# Patient Record
Sex: FEMALE | Race: WHITE | NOT HISPANIC OR LATINO | Employment: OTHER | URBAN - METROPOLITAN AREA
[De-identification: names, ages, dates, MRNs, and addresses within clinical notes are randomized per-mention and may not be internally consistent; named-entity substitution may affect disease eponyms.]

---

## 2017-01-11 ENCOUNTER — VIRTUAL VISIT (OUTPATIENT)
Dept: EDUCATION SERVICES | Facility: OTHER | Age: 62
End: 2017-01-11

## 2017-01-11 DIAGNOSIS — E11.319 UNCONTROLLED TYPE 2 DIABETES MELLITUS WITH RETINOPATHY, WITH LONG-TERM CURRENT USE OF INSULIN, MACULAR EDEMA PRESENCE UNSPECIFIED, UNSPECIFIED RETINOPATHY SEVERITY: Primary | ICD-10-CM

## 2017-01-11 DIAGNOSIS — E11.65 UNCONTROLLED TYPE 2 DIABETES MELLITUS WITH RETINOPATHY, WITH LONG-TERM CURRENT USE OF INSULIN, MACULAR EDEMA PRESENCE UNSPECIFIED, UNSPECIFIED RETINOPATHY SEVERITY: Primary | ICD-10-CM

## 2017-01-11 DIAGNOSIS — Z79.4 UNCONTROLLED TYPE 2 DIABETES MELLITUS WITH RETINOPATHY, WITH LONG-TERM CURRENT USE OF INSULIN, MACULAR EDEMA PRESENCE UNSPECIFIED, UNSPECIFIED RETINOPATHY SEVERITY: Primary | ICD-10-CM

## 2017-01-11 NOTE — PROGRESS NOTES
Diabetes Follow-up    Subjective/Objective:    Holly Muir sent in blood glucose log for review. Last date of communication was: 12/28/16.    Diabetes is being managed with V-Go 40 (changes at 9 pm at night) + Novolog, about 6 - 10 units when she's used up the V-Go mealtime insulin (which isn't as often now).   NPH, 10 units at bedtime at 9 pm.  Accu-chek Expert meter settings:  Carb Ratio:  1  Unit per 5 grams.  Sensitivity:  30 mg/dl.  BG target:  120 - 160 mg/dl .     Reports 2 days ago started hallucinating and felt funny.  She doesn't feel it is because of her medications.    BG/Food Log:   Date Breakfast  Lunch  Dinner  Bedtime    Before After Before After Before After    1/11 317 (ate in the middle of the night) - 78 - - - -   1/10 - - - 276 - - -   1/9 210 196 236 170 114 - -   1/8 - - 213 - - - 170   1/7 - - - - - - 122   1/6 - - 244 114 - - -   1/5 258 - - 222 231 - -     Date Breakfast  Lunch  Dinner  Bedtime    Before After Before After Before After    1/4 - - - - 136 - 247   1/3 344  - 205 156 127 - 188   1/2 - - 309 326 219 164 103  81  61 (3am)   1/1 - - 361 - 329 - -   31 - - - - 243 - -   30 203 - - 175 - - -   29 307 - - 169 - - -     Sleep at night has not been as good.      Tried taking insulin with her coffee, as instructed, a couple of times but admits she is not in the habit of doing it daily.      Assessment:    Blood sugars above goal the majority of the time.  High blood sugars may be due to over eating or eating in the middle of the night.  Poor sleep may also contribute to high blood sugars.  Will benefit from additional insulin at bedtime to improve fasting blood sugars, which may then improve blood sugars during the day.      Plan/Response:  Recommend that patient take insulin with coffee in the morning - 1 click or 2 units  Recommend increase to insulin - increase bedtime NPH insulin from 10 to 12 units.  Contact psychologist with concerns about hallucinating.    FOLLOW  UP:  Telephone visit in 2 weeks.    Maria T Elias RD, MARCIANO, CDE      Any diabetes medication dose changes were made via the CDE Protocol and Collaborative Practice Agreement with the patient's primary care provider. A copy of this encounter was shared with the provider.

## 2017-01-13 ENCOUNTER — TELEPHONE (OUTPATIENT)
Dept: PEDIATRICS | Facility: CLINIC | Age: 62
End: 2017-01-13

## 2017-01-13 DIAGNOSIS — E11.65 UNCONTROLLED TYPE 2 DIABETES MELLITUS WITH RETINOPATHY, WITH LONG-TERM CURRENT USE OF INSULIN, MACULAR EDEMA PRESENCE UNSPECIFIED, UNSPECIFIED RETINOPATHY SEVERITY: Primary | ICD-10-CM

## 2017-01-13 DIAGNOSIS — Z79.4 UNCONTROLLED TYPE 2 DIABETES MELLITUS WITH RETINOPATHY, WITH LONG-TERM CURRENT USE OF INSULIN, MACULAR EDEMA PRESENCE UNSPECIFIED, UNSPECIFIED RETINOPATHY SEVERITY: Primary | ICD-10-CM

## 2017-01-13 DIAGNOSIS — E11.319 UNCONTROLLED TYPE 2 DIABETES MELLITUS WITH RETINOPATHY, WITH LONG-TERM CURRENT USE OF INSULIN, MACULAR EDEMA PRESENCE UNSPECIFIED, UNSPECIFIED RETINOPATHY SEVERITY: Primary | ICD-10-CM

## 2017-01-13 NOTE — TELEPHONE ENCOUNTER
insulin NPH (NOVOLIN N VIAL) 100 UNIT/ML injection 3 mL 2 2017  No      Si units at bedtime     Class: E-Prescribe     Notes to Pharmacy: Dose adjustment.  Please do not fill until patient requests it.       Received fax from pharmacy requesting clarification on above Rx sent. Pharmacy notes Rx states Dispense 3mL, but thinks it should be 30mL. Please clarify and update script to pharmacy if needed.  eBre Patino, CMA

## 2017-01-19 ENCOUNTER — OFFICE VISIT (OUTPATIENT)
Dept: FAMILY MEDICINE | Facility: CLINIC | Age: 62
End: 2017-01-19
Payer: MEDICARE

## 2017-01-19 VITALS
DIASTOLIC BLOOD PRESSURE: 64 MMHG | HEART RATE: 88 BPM | SYSTOLIC BLOOD PRESSURE: 123 MMHG | BODY MASS INDEX: 35.11 KG/M2 | WEIGHT: 192 LBS | OXYGEN SATURATION: 96 % | TEMPERATURE: 98.1 F

## 2017-01-19 DIAGNOSIS — J06.9 UPPER RESPIRATORY TRACT INFECTION, UNSPECIFIED TYPE: Primary | ICD-10-CM

## 2017-01-19 DIAGNOSIS — R05.9 COUGH: ICD-10-CM

## 2017-01-19 PROCEDURE — 99213 OFFICE O/P EST LOW 20 MIN: CPT | Performed by: PHYSICIAN ASSISTANT

## 2017-01-19 RX ORDER — BENZONATATE 200 MG/1
200 CAPSULE ORAL 3 TIMES DAILY PRN
Qty: 21 CAPSULE | Refills: 0 | Status: SHIPPED | OUTPATIENT
Start: 2017-01-19 | End: 2017-05-26

## 2017-01-19 NOTE — MR AVS SNAPSHOT
After Visit Summary   1/19/2017    Holly Muir    MRN: [id number]           Patient Information     Date Of Birth          1955        Visit Information        Provider Department      1/19/2017 1:50 PM Saqib Diaz PA-C [facility]        Today's Diagnoses     Upper respiratory tract infection, unspecified type    -  1     Cough            Follow-ups after your visit        Your next 10 appointments already scheduled     Jan 19, 2017  1:50 PM   SHORT with Saqib Diaz PA-C   [facility]    [address] [facility] [id number] [phone]            Jan 24, 2017 11:45 AM   Telephone Visit with ER DIABETIC ED RESOURCE   [facility]    [address] [facility] [id number] [phone]           Note: this is not an onsite visit; there is no need to come to the facility.            Feb 24, 2017  8:00 AM   New Visit with Elizabeth Medrano MD   [facility]    [address] [place] [id number] [phone]              Who to contact     If you have questions or need follow up information about today's clinic visit or your schedule please contact [facility] directly at [phone].  Normal or non-critical lab and imaging results will be communicated to you by MyChart, letter or phone within 4 business days after the clinic has received the results. If you do not hear from us within 7 days, please contact the clinic through MyChart or phone. If you have a critical or abnormal lab result, we will notify you by phone as soon as possible.  Submit refill requests through [employer] or call your pharmacy and they will forward the refill request to us. Please allow 3 business days for your refill to be completed.          Additional Information About Your Visit        MyChart Information     [employer] [email or web address] lets  "you send messages to your doctor, view your test results, renew your prescriptions, schedule appointments and more. To sign up, go to [email or web address] . Click on \"Log in\" on the left side of the screen, which will take you to the Welcome page. Then click on \"Sign up Now\" on the right side of the page.     You will be asked to enter the access code listed below, as well as some personal information. Please follow the directions to create your username and password.     Your access code is: [email or web address]  Expires: 2017  1:45 PM     Your access code will  in 90 days. If you need help or a new code, please call your [place] clinic or [phone].        Care EveryWhere ID     This is your Care EveryWhere ID. This could be used by other organizations to access your [place] medical records  [id number]        Your Vitals Were     Pulse Temperature Pulse Oximetry             88 98.1  F (36.7  C) (Oral) 96%          Blood Pressure from Last 3 Encounters:   17 123/64   16 103/69   16 88/58    Weight from Last 3 Encounters:   17 192 lb (87.091 kg)   16 189 lb 4.8 oz (85.866 kg)   16 195 lb (88.451 kg)              Today, you had the following     No orders found for display         Today's Medication Changes          These changes are accurate as of: 17  1:45 PM.  If you have any questions, ask your nurse or doctor.               Start taking these medicines.        Dose/Directions    benzonatate 200 MG capsule   Commonly known as:  TESSALON   Used for:  Upper respiratory tract infection, unspecified type, Cough   Started by:  Saqib Diaz PA-C        Dose:  200 mg   Take 1 capsule (200 mg) by mouth 3 times daily as needed for cough   Quantity:  21 capsule   Refills:  0         These medicines have changed or have updated prescriptions.        Dose/Directions    omeprazole 40 MG capsule   Commonly known as:  priLOSEC   This may have changed:  additional " instructions   Used for:  Gastroesophageal reflux disease without esophagitis        Dose:  40 mg   Take 1 capsule (40 mg) by mouth daily Take 30-60 minutes before a meal.   Quantity:  90 capsule   Refills:  3            Where to get your medicines      These medications were sent [place] - [address] [facility], [address]  [address] [facility], [facility] [address], [facility] [address]     Phone:  [phone]    - benzonatate 200 MG capsule             Primary Care Provider Office Phone # Fax #    Tiffany King [phone]       [facility] [address]  [facility] [address]        Goals        Patient-Stated    I will continue to monitor my blood sugars and treat the results as directed.     Goal Comments - Note created  12/9/2015 10:26 AM by Randy Suarez RN    As of today's date 12/9/2015 goal is met at 0 - 25%.   Goal Status:  Active        Psychosocial     Goal Comments - Note edited  5/5/2016 10:35 AM by Kristin Naqvi BSW    As of today's date 5/5/2016 goal is met at 76 - 100%.   Goal Status:  Complete   Pt is certified to use [employer]   As of today's date 4/28/2016 goal is met at 76 - 100%.   Goal Status:  Complete  I want to become certified to use [employer] for transportation   As of today's date 3/4/2016 goal is met at 0 - 25%.   Goal Status:  Active    As of today's date 3/31/2016 goal is met at 26 - 50%.   Goal Status:  Active  [employer] application mailed from clinic on 3-25-16  ALMA Myrick          Thank you!     Thank you for choosing [facility]  for your care. Our goal is always to provide you with excellent care. Hearing back from our patients is one way we can continue to improve our services. Please take a few minutes to complete the written survey that you may receive in the mail after your visit with us. Thank you!             Your Updated Medication List - Protect others around you: Learn how to safely use, store and  throw away your medicines at www.disposemymeds.org.          This list is accurate as of: 1/19/17  1:45 PM.  Always use your most recent med list.                   Brand Name Dispense Instructions for use    ABILIFY 15 MG tablet   Generic drug:  ARIPiprazole     90 tablet    TAKE 1 TABLET BY MOUTH AT BEDTIME       ACCU-CHEK INSTANT CONTROL Liqd     1 Bottle    1 Application as needed       aspirin 81 MG tablet     90 tablet    Take 1 tablet (81 mg) by mouth daily       B-COMPLEX/B-12 PO      Take 1 tablet by mouth daily.       benzonatate 200 MG capsule    TESSALON    21 capsule    Take 1 capsule (200 mg) by mouth 3 times daily as needed for cough       blood glucose monitoring lancets     3 Box    1 each 8 times daily Use to test blood sugar 8 times daily or as directed.       * blood glucose monitoring test strip    ACCU-CHEK CELIA    250 each    Use to test blood sugars 8 times daily or as directed.       * ACCU-CHEK CELIA PLUS test strip   Generic drug:  blood glucose monitoring     250 each    USE TO TEST BLOOD SUGARS 8 TIMES DAILY OR AS DIRECTED.       buPROPion 150 MG 24 hr tablet    WELLBUTRIN XL    270 tablet    TAKE 3 TABLETS (450 MG) BY MOUTH EVERY MORNING       BusPIRone HCl 30 MG Tabs     60 tablet    TAKE 1 TABLET BY MOUTH TWICE A DAY       enalapril 2.5 MG tablet    VASOTEC    90 tablet    TAKE 1 TABLET BY MOUTH EVERY DAY       gabapentin 100 MG capsule    NEURONTIN    90 capsule    TAKE 1 CAPSULE (100 MG) BY MOUTH 3 TIMES DAILY       ibuprofen 200 MG tablet    ADVIL/MOTRIN     Take 3-4 tablets by mouth every 4 hours as needed.       * insulin aspart 100 UNIT/ML injection    NovoLOG FLEXPEN    1 Month    Use to supplement V-Go 40.  Up to 10 units a day.       * NovoLOG VIAL 100 UNITS/ML injection   Generic drug:  insulin aspart     30 mL    INJECT SUBCUTANEOUSLY UP TO 76 UNITS A DAY       * insulin aspart 100 UNIT/ML injection    NovoLOG FLEXPEN    1 Month    Use up to 10 units a day to supplement  "V-Go       insulin  UNIT/ML injection    NovoLIN N VIAL    30 mL    12 units at bedtime       * insulin pen needle 31G X 5 MM     3 Box    3 Box See Admin Instructions Use four times a day times a day       * insulin pen needle 31G X 5 MM    B-D U/F    100 each    Use 1 daily or as directed.       insulin syringe-needle U-100 31G X 5/16\" 0.3 ML    BD insulin syringe ultrafine    100 each    Use one syringe daily or as directed.       metFORMIN 1000 MG tablet    GLUCOPHAGE    60 tablet    TAKE 1 TABLET BY MOUTH 2 TIMES DAILY WITH MEALS       Multi-vitamin Tabs tablet   Generic drug:  multivitamin, therapeutic with minerals      1 TABLET DAILY       nystatin 514816 UNIT/GM Powd    MYCOSTATIN    60 g    Apply 1 dose topically 3 times daily as needed       omeprazole 40 MG capsule    priLOSEC    90 capsule    Take 1 capsule (40 mg) by mouth daily Take 30-60 minutes before a meal.       order for DME     1 Units    Equipment being ordered: Cane       order for DME     3 Month    Equipment being ordered:  Ostomy supplies.  Drain Pouch by [employer] #.    Also needs Barrier by [place] #.       PARoxetine 20 MG tablet    PAXIL    270 tablet    TAKE 3 TABLETS AT BEDTIME       propranolol 60 MG 24 hr capsule    INDERAL LA    60 capsule    Take 1 capsule (60 mg) by mouth daily May take a second dose if tremor recurs.       simvastatin 20 MG tablet    ZOCOR    90 tablet    TAKE 1 TABLET (20 MG) BY MOUTH AT BEDTIME       SKIN PREP WIPES Misc     1 Bottle    to be used with colostomy care       traMADol 50 MG tablet    ULTRAM    60 tablet    TAKE 1 TABLET BY MOUTH TWICE A DAY AS NEEDED FOR MODERATE PAIN       Universal Remover Wipes Misc     1 Each    uses to remove barrier from colostomy bag at time of change       vitamin D 1000 UNITS capsule     90 capsule    Take 2,000 to 4,000 Units per day       wearable insulin delivery device kit     30 each    USE AS DIRECTED       * Notice:  This list has 7 " medication(s) that are the same as other medications prescribed for you. Read the directions carefully, and ask your doctor or other care provider to review them with you.

## 2017-01-19 NOTE — NURSING NOTE
"Chief Complaint   Patient presents with     Cough       Initial /64 mmHg  Pulse 88  Temp(Src) 98.1  F (36.7  C) (Oral)  Wt 192 lb (87.091 kg)  SpO2 96% Estimated body mass index is 35.11 kg/(m^2) as calculated from the following:    Height as of 12/19/16: 5' 2\" (1.575 m).    Weight as of this encounter: 192 lb (87.091 kg).  BP completed using cuff size: mattie Byrnes CMA      "

## 2017-01-19 NOTE — PROGRESS NOTES
SUBJECTIVE:                                                    Holly Muir is a 61 year old female who presents to clinic today for the following health issues:    RESPIRATORY SYMPTOMS      Duration: 3 days    Description  Cough, runny nose, chills, headache    Severity: moderate    Accompanying signs and symptoms: None    History (predisposing factors):  none    Precipitating or alleviating factors: daughter was recently diagnosed with bronchitis    Therapies tried and outcome:  Robitussin        Problem list and histories reviewed & adjusted, as indicated.  Additional history: as documented    Patient Active Problem List   Diagnosis     Hyperlipidemia LDL goal <100     Severe major depression with psychotic features (H)     History of cervical cancer     S/P colostomy (H)     Mixed incontinence urge and stress     Memory loss     Type 2 diabetes mellitus, uncontrolled A1C goal <8%     Obesity     Pain in joint, hand     Trigger finger, acquired     Synovitis and tenosynovitis     Dermatochalasis     Presbyopia     OCD (obsessive compulsive disorder)     HTN, goal below 140/90     Cataracts, both eyes     Health Care Home     Lumbago     Sprain of lumbar region     Generalized osteoarthrosis, unspecified site     Rotator cuff impingement syndrome     Hypoglycemia due to insulin     Encounter for long-term (current) use of insulin (H)     Colostomy, evaluate (H)     Urinary retention     ACP (advance care planning)     Bilateral low back pain without sciatica     Anxiety     Fecal incontinence due to anorectal disorder     Visual hallucination     Type 2 diabetes mellitus with hyperglycemia (H)     Past Surgical History   Procedure Laterality Date     Hysterectomy, pap no longer indicated       done in [place]-cervical cancer     Salpingo oophorectomy,r/l/jennifer       Salpingo Oophorectomy, RT/LT/JENNIFER     Colostomy  2000     Arthroscopy knee rt/lt       LT     Breast lumpectomy, rt/lt       LT-benign      C excis  pterygium  10/08     [facility]      colonoscopy thru stoma, diagnostic  2002     normal per report-no records available-records destroyed     C stomach surgery procedure unlisted       Knee surgery       Colostomy       Colonoscopy  5/10/2012     Procedure:COLONOSCOPY; screening colonoscopy; Surgeon:MILLA VEGA; Location:[facility] OR     Implant stimulator sacral nerve stage one Right 3/10/2015     Procedure: IMPLANT STIMULATOR SACRAL NERVE STAGE ONE;  Surgeon: Teodora Yusuf MD;  Location: [facility] OR     Implant stimulator sacral nerve stage two Right 3/31/2015     Procedure: IMPLANT STIMULATOR SACRAL NERVE STAGE TWO;  Surgeon: Teodora Yusuf MD;  Location: [facility] OR       Social History   Substance Use Topics     Smoking status: Former Smoker     Quit date: 08/09/2016     Smokeless tobacco: Never Used     Alcohol Use: 0.0 oz/week     0 Standard drinks or equivalent per week      Comment: social occasions     Family History   Problem Relation Age of Onset     DIABETES Mother      CEREBROVASCULAR DISEASE Mother      DIABETES Father      Hypertension Father      CANCER Maternal Grandfather      CANCER Paternal Grandfather      DIABETES Brother      DIABETES Sister      Thyroid Disease Daughter      Thyroid Disease Sister      Glaucoma No family hx of      Macular Degeneration No family hx of          Current Outpatient Prescriptions   Medication Sig Dispense Refill     benzonatate (TESSALON) 200 MG capsule Take 1 capsule (200 mg) by mouth 3 times daily as needed for cough 21 capsule 0     insulin NPH (NOVOLIN N VIAL) 100 UNIT/ML injection 12 units at bedtime 30 mL 2     simvastatin (ZOCOR) 20 MG tablet TAKE 1 TABLET (20 MG) BY MOUTH AT BEDTIME 90 tablet 3     gabapentin (NEURONTIN) 100 MG capsule TAKE 1 CAPSULE (100 MG) BY MOUTH 3 TIMES DAILY 90 capsule 3     insulin aspart (NOVOLOG FLEXPEN) 100 UNIT/ML injection Use up to 10 units a day to supplement V-Go 1 Month 1     wearable insulin delivery device (V-GO 40)  "kit USE AS DIRECTED 30 each 2     metFORMIN (GLUCOPHAGE) 1000 MG tablet TAKE 1 TABLET BY MOUTH 2 TIMES DAILY WITH MEALS 60 tablet 5     BusPIRone HCl 30 MG TABS TAKE 1 TABLET BY MOUTH TWICE A DAY 60 tablet 0     NOVOLOG VIAL 100 UNIT/ML soln INJECT SUBCUTANEOUSLY UP TO 76 UNITS A DAY 30 mL 0     propranolol (INDERAL LA) 60 MG capsule Take 1 capsule (60 mg) by mouth daily May take a second dose if tremor recurs. 60 capsule 11     traMADol (ULTRAM) 50 MG tablet TAKE 1 TABLET BY MOUTH TWICE A DAY AS NEEDED FOR MODERATE PAIN 60 tablet 3     enalapril (VASOTEC) 2.5 MG tablet TAKE 1 TABLET BY MOUTH EVERY DAY 90 tablet 2     ACCU-CHEK CELIA PLUS test strip USE TO TEST BLOOD SUGARS 8 TIMES DAILY OR AS DIRECTED. 250 each 3     PARoxetine (PAXIL) 20 MG tablet TAKE 3 TABLETS AT BEDTIME 270 tablet 1     omeprazole (PRILOSEC) 40 MG capsule Take 1 capsule (40 mg) by mouth daily Take 30-60 minutes before a meal. (Patient taking differently: Take 40 mg by mouth daily Take 2 tabs dailyTake 30-60 minutes before a meal.) 90 capsule 3     aspirin 81 MG tablet Take 1 tablet (81 mg) by mouth daily 90 tablet 3     nystatin (MYCOSTATIN) 622861 UNIT/GM POWD Apply 1 dose topically 3 times daily as needed 60 g 3     buPROPion (WELLBUTRIN XL) 150 MG 24 hr tablet TAKE 3 TABLETS (450 MG) BY MOUTH EVERY MORNING 270 tablet 1     ABILIFY 15 MG tablet TAKE 1 TABLET BY MOUTH AT BEDTIME 90 tablet 1     blood glucose monitoring (ACCU-CHEK CELIA) test strip Use to test blood sugars 8 times daily or as directed. 250 each 3     insulin aspart (NOVOLOG FLEXPEN) 100 UNIT/ML soln Use to supplement V-Go 40.  Up to 10 units a day. 1 Month 1     insulin syringe-needle U-100 (BD INSULIN SYRINGE ULTRAFINE) 31G X 5/16\" 0.3 ML Use one syringe daily or as directed. 100 each prn     insulin pen needle (B-D U/F) 31G X 5 MM Use 1 daily or as directed. 100 each prn     blood glucose monitoring (ACCU-CHEK FASTCLIX) lancets 1 each 8 times daily Use to test blood sugar 8 " times daily or as directed. 3 Box prn     Blood Glucose Calibration (ACCU-CHEK INSTANT CONTROL) LIQD 1 Application as needed 1 Bottle prn     ORDER FOR DME Equipment being ordered:  Ostomy supplies.  Drain Pouch by [place] #.    Also needs Barrier by [place] #. 3 Month 4     insulin pen needle needle 3 Box See Admin Instructions Use four times a day times a day 3 Box 3     Cholecalciferol (VITAMIN D) 1000 UNITS capsule Take 2,000 to 4,000 Units per day 90 capsule      ORDER FOR DME Equipment being ordered: Cane 1 Units 0     B Complex Vitamins (B-COMPLEX/B-12 PO) Take 1 tablet by mouth daily.        ibuprofen (ADVIL,MOTRIN) 200 MG tablet Take 3-4 tablets by mouth every 4 hours as needed.        UNIVERSAL REMOVER WIPES EX MISC uses to remove barrier from colostomy bag at time of change 1 Each 3     SKIN PREP WIPES MISC to be used with colostomy care 1 Bottle 3     MULTI-VITAMIN OR TABS 1 TABLET DAILY       Allergies   Allergen Reactions     Morphine Sulfate Itching     BP Readings from Last 3 Encounters:   01/19/17 123/64   12/19/16 103/69   12/01/16 88/58    Wt Readings from Last 3 Encounters:   01/19/17 192 lb (87.091 kg)   12/19/16 189 lb 4.8 oz (85.866 kg)   12/01/16 195 lb (88.451 kg)                  Labs reviewed in EPIC  Problem list, Medication list, Allergies, and Medical/Social/Surgical histories reviewed in [facility] and updated as appropriate.    ROS:  Constitutional, HEENT, cardiovascular, pulmonary, gi and gu systems are negative, except as otherwise noted.    OBJECTIVE:                                                    /64 mmHg  Pulse 88  Temp(Src) 98.1  F (36.7  C) (Oral)  Wt 192 lb (87.091 kg)  SpO2 96%  Body mass index is 35.11 kg/(m^2).  GENERAL: healthy, alert and no distress  EYES: Eyes grossly normal to inspection, PERRL and conjunctivae and sclerae normal  HENT: ear canals and TM's normal, nose and mouth without ulcers or lesions  NECK: no adenopathy, no asymmetry, masses,  or scars and thyroid normal to palpation  RESP: lungs clear to auscultation - no rales, rhonchi or wheezes  CV: regular rate and rhythm, normal S1 S2, no S3 or S4, no murmur, click or rub, no peripheral edema and peripheral pulses strong  ABDOMEN: soft, nontender, no hepatosplenomegaly, no masses and bowel sounds normal  MS: no gross musculoskeletal defects noted, no edema    Diagnostic Test Results:  none      ASSESSMENT/PLAN:                                                        ICD-10-CM    1. Upper respiratory tract infection, unspecified type J06.9 benzonatate (TESSALON) 200 MG capsule   2. Cough R05 benzonatate (TESSALON) 200 MG capsule   Warning signs discussed.  side effects discussed  Symptomatic treatment: such as fluids,  OTC acetaminophen and /or non-steroidal anti-inflammatory medication.  Follow up  1-2 wks as needed      Saqib Diaz PA-C  [facility]

## 2017-01-24 ENCOUNTER — VIRTUAL VISIT (OUTPATIENT)
Dept: EDUCATION SERVICES | Facility: OTHER | Age: 62
End: 2017-01-24

## 2017-01-24 DIAGNOSIS — E11.65 TYPE 2 DIABETES MELLITUS WITH HYPERGLYCEMIA (H): Primary | ICD-10-CM

## 2017-01-24 NOTE — PROGRESS NOTES
Diabetes Follow-up    Subjective/Objective:    Holly Muir sent in blood glucose log for review. Last date of communication was: 1/11/17.  Unable to talk on today.  Will try again in 2 days, 1/26/17.    Diabetes is being managed with V-Go 40 (changes at 9 pm at night) + Novolog, about 6 - 10 units when she's used up the V-Go mealtime insulin.   NPH, 12 units at bedtime at 9 pm.  Accu-chek Expert meter settings:  Carb Ratio:  1  Unit per 5 grams.  Sensitivity:  30 mg/dl.  BG target:  120 - 160 mg/dl .    BG/Food Log:   Date Breakfast  Lunch  Dinner  Bedtime    Before After Before After Before After    1/26 295 (at in middle of night)  97       1/25   224  120  265   1/24 252   91 82     1/23 248  135 105 110 107    1/22     474  372 (3am in the next morning)   1/21     474     1/20 142      119 (3am)     Date Breakfast  Lunch  Dinner  Bedtime    Before After Before After Before After    1/19 438 / 252         1/17   245 121 154 224    1/16 307 /285         1/15 136 (3am)         1/13 189 325     135   1/12    279 129                 Admits if BG at bedtime is <120 mg/dl, will eat a bedtime snack of bread and butter.  Eats in the middle of the night - will have fruit.  She states she takes insulin if she has a carbohydrate containing food.    Not going to the fitness center, but plans to return in a week.    Assessment:    Fasting blood glucose: above target the majority of the time.  NPH insulin was increased 2 weeks ago, but did not influence the fasting blood sugars.  Daytime blood sugars have improved.  High blood sugars are likely due to eating high carbohydrate meals.    Plan/Response:  Increase bedtime NPH from 12 to 14 units.  If, after 3 days, fasting blood sugar remains >200 mg/dl, increase bedtime NPH insulin to 16 units.  Encouraged non-carbohydrate foods (cheese, non-starchy vegetables) as middle of the night snacks if she chooses to eat.  Encouraged regular physical activity.    Maria T Elias RD,  LEORA [facility]AR[facility]      Any diabetes medication dose changes were made via the CDE Protocol and Collaborative Practice Agreement with the patient's primary care provider. A copy of this encounter was shared with the provider.

## 2017-01-26 ENCOUNTER — CARE COORDINATION (OUTPATIENT)
Dept: CARE COORDINATION | Facility: CLINIC | Age: 62
End: 2017-01-26

## 2017-01-26 NOTE — PROGRESS NOTES
Clinic Care Coordination Contact - Social Work - Follow-Up - 1-26-17  Care Team Conversations    SW contacted pt via phone to follow-up regarding pt's anxiety and depression.  Pt reports that she continues to see her counselor and psychiatrist at [facility] regularly and feels that helps her mood.  Pt also takes Paxil and Wellbutrin which pt finds helpful.  Pt continues to have [facility] RN visit regularly (Adams is RN Case Manager, [phone]) and reports that pt is stable at home currently.  SW will follow-up with pt in one month.      ALMA Myrick  Care Coordinator - Social Work  [facility]  Office:  [phone]  1/26/2017 3:33 PM

## 2017-02-02 DIAGNOSIS — F32.3 SEVERE MAJOR DEPRESSION WITH PSYCHOTIC FEATURES (H): Primary | ICD-10-CM

## 2017-02-06 RX ORDER — PAROXETINE 20 MG/1
TABLET, FILM COATED ORAL
Qty: 270 TABLET | Refills: 0 | Status: SHIPPED | OUTPATIENT
Start: 2017-02-06 | End: 2017-07-26

## 2017-02-09 ENCOUNTER — VIRTUAL VISIT (OUTPATIENT)
Dept: EDUCATION SERVICES | Facility: CLINIC | Age: 62
End: 2017-02-09
Payer: MEDICARE

## 2017-02-09 DIAGNOSIS — E11.319 UNCONTROLLED TYPE 2 DIABETES MELLITUS WITH RETINOPATHY, WITH LONG-TERM CURRENT USE OF INSULIN, MACULAR EDEMA PRESENCE UNSPECIFIED, UNSPECIFIED RETINOPATHY SEVERITY: Primary | ICD-10-CM

## 2017-02-09 DIAGNOSIS — Z79.4 UNCONTROLLED TYPE 2 DIABETES MELLITUS WITH RETINOPATHY, WITH LONG-TERM CURRENT USE OF INSULIN, MACULAR EDEMA PRESENCE UNSPECIFIED, UNSPECIFIED RETINOPATHY SEVERITY: Primary | ICD-10-CM

## 2017-02-09 DIAGNOSIS — E11.65 UNCONTROLLED TYPE 2 DIABETES MELLITUS WITH RETINOPATHY, WITH LONG-TERM CURRENT USE OF INSULIN, MACULAR EDEMA PRESENCE UNSPECIFIED, UNSPECIFIED RETINOPATHY SEVERITY: Primary | ICD-10-CM

## 2017-02-09 PROCEDURE — 99207 ZZC NO BILLABLE SERVICE THIS VISIT: CPT

## 2017-02-09 NOTE — PROGRESS NOTES
Diabetes Follow-up    Subjective/Objective:    Holly Muir sent in blood glucose log for review. Last date of communication was: 1/26/17.    Diabetes is being managed with   V-Go 40 (changes at 9 pm at night) + Novolog, about 6 - 10 units when she's used up the V-Go mealtime insulin.   NPH, 16 units at bedtime at 9 pm.  Accu-chek Expert meter settings:  Carb Ratio:  1  Unit per 5 grams.  Sensitivity:  30 mg/dl.  BG target:  120 - 160 mg/dl .    BG/Food Log:   Date Breakfast  Lunch  Dinner  Bedtime    Before After Before After Before After    2/9 467* - - - - - -   2/8 146 - - 228 60 - -   2/7 55 (3am)  248 - 97 115 236 106 252   2/6 188 - - 145 269 147 / 156 75   2/5 - - - 490** - - 450**   2/4 - - - 294 - - -   2/3 109 - - 121 186 - -     Date Breakfast  Lunch  Dinner  Bedtime    Before After Before After Before After    2/2 144 (3am)  233 96 - 89 - - 88   2/1 60 (4:46am)  200 187 65 120/82 - - 95 / 124   1/31 109 -  - 121 - -   1/30 - - - - - - -   - - - - - - - -   - - - - - - - -   - - - - - - - -       *ate late and did not take insulin  **Super bowl snacking    Reports getting out of the house with her friends more recently.  Went swimming at the [employer] this past week.      Assessment:    Overall improvement in blood sugars.  Two weeks ago, bedtime NPH insulin was increased from 12 to 16 units.  Fasting blood sugars improved, however Holly experienced 2 low blood sugars in the middle of the night over the past week and a half.  May benefit from a slight reduction in bedtime NPH insulin.    Holly is commended for her efforts in getting out of the house more and taking an exercise class.    Plan/Response:  Recommend decrease to insulin - Decrease bedtime NPH insulin from 16 to 15 units.    FOLLOW UP:    Telephone visit in 2 weeks.    Maria T Elias RD, LD, CDE      Any diabetes medication dose changes were made via the CDE Protocol and Collaborative Practice Agreement with the patient's primary care provider.  A copy of this encounter was shared with the provider.

## 2017-02-22 ENCOUNTER — TELEPHONE (OUTPATIENT)
Dept: ENDOCRINOLOGY | Facility: CLINIC | Age: 62
End: 2017-02-22

## 2017-02-22 ENCOUNTER — TELEPHONE (OUTPATIENT)
Dept: PEDIATRICS | Facility: CLINIC | Age: 62
End: 2017-02-22

## 2017-02-22 DIAGNOSIS — Z79.4 UNCONTROLLED TYPE 2 DIABETES MELLITUS WITH HYPERGLYCEMIA, WITH LONG-TERM CURRENT USE OF INSULIN (H): ICD-10-CM

## 2017-02-22 DIAGNOSIS — E11.65 UNCONTROLLED TYPE 2 DIABETES MELLITUS WITH HYPERGLYCEMIA, WITH LONG-TERM CURRENT USE OF INSULIN (H): ICD-10-CM

## 2017-02-22 DIAGNOSIS — M25.561 ARTHRALGIA OF BOTH KNEES: ICD-10-CM

## 2017-02-22 DIAGNOSIS — M17.10 ARTHRITIS OF KNEE: ICD-10-CM

## 2017-02-22 DIAGNOSIS — M25.562 ARTHRALGIA OF BOTH KNEES: ICD-10-CM

## 2017-02-22 DIAGNOSIS — E11.42 DIABETIC POLYNEUROPATHY ASSOCIATED WITH TYPE 2 DIABETES MELLITUS (H): ICD-10-CM

## 2017-02-22 RX ORDER — TRAMADOL HYDROCHLORIDE 50 MG/1
50-100 TABLET ORAL DAILY PRN
Qty: 60 TABLET | Refills: 3 | Status: SHIPPED | OUTPATIENT
Start: 2017-02-22 | End: 2017-10-21

## 2017-02-22 RX ORDER — GABAPENTIN 300 MG/1
300 CAPSULE ORAL 3 TIMES DAILY
Qty: 90 CAPSULE | Refills: 1 | Status: SHIPPED | OUTPATIENT
Start: 2017-02-22 | End: 2017-05-03

## 2017-02-22 NOTE — TELEPHONE ENCOUNTER
Meredith, home care RN.  States patient needs refill of tramadol 50mg. Wondering if a bigger dose can be given or another alternative pain med.    Last filled Jan 6 for 60 pills.  Patient only taking 1 pill a day. Not really helping pain but patient does not want to take more than once a day. Home care nurse wondering if can get a higher dose once a day instead or something else that could help.  Patient does not want to take pain meds more than once a day.  She is having pain in her leg and the home care nurse said you can tell she is in pain as she is limping and walking strangely.  Patient states its related to the weather and feels like its in her bone.  It has been worse for a couple of days now.     Please call patient when RX completed.    Zoraida Min RN,   . [facility]

## 2017-02-22 NOTE — TELEPHONE ENCOUNTER
Spoke to patient.  She states she already tried taking (2-50mg pills) or 100mg in the morning and that is not working either for the pain.      Will route back to Dr. King as the new RX would be the same dose as patient is currently taking.    Zoraida Min RN,   [facility], [facility]

## 2017-02-22 NOTE — TELEPHONE ENCOUNTER
She is taking gabapentin but at a very low dose. Will increase this to gabapentin 300 mg three times a day to see if this helps.   Can we have the nurse clarify what pain she is taking tramadol for? Has she try tylenol 1000 mg three times a day? If not, she can try this in addition to increase gabapentin.

## 2017-02-22 NOTE — TELEPHONE ENCOUNTER
Left message for Meredith, home care RN,  to return call to clinic and ask to speak with RN.    Zoraida Min RN,   [facility]

## 2017-02-22 NOTE — TELEPHONE ENCOUNTER
Patient given information below from Dr. King.  Patients pain is from her leg pain.    She has not tried the tylenol as she states it doesn't work.  Informed her that she can try the tylenol 1000mg three times a day in addition to increase gabapentin.      Patient verbalized understanding.    RX tramadol faxed to [facility] pharmacy. Patient informed RX at pharmacy.    Zoraida Min RN,   [facility]

## 2017-02-22 NOTE — TELEPHONE ENCOUNTER
novolog         Last Written Prescription Date: 12/14/16  Last Fill Quantity: 30ml, # refills: 0  Last Office Visit with [facility], [facility] or  Health prescribing provider:  12/19/16   Next 5 appointments (look out 90 days)     Feb 23, 2017 11:15 AM CST   Telephone Visit with [facility]   [facility] ([facility]    [address] [place] [id number] [phone]                   BP Readings from Last 3 Encounters:   01/19/17 123/64   12/19/16 103/69   12/01/16 (!) 88/58     Lab Results   Component Value Date    MICROL 18 12/19/2016     No results found for: MICROALBUMIN  Creatinine   Date Value Ref Range Status   12/19/2016 0.88 0.52 - 1.04 mg/dL Final   ]  GFR Estimate   Date Value Ref Range Status   12/19/2016 66 >60 mL/min/1.7m2 Final   05/25/2016 74 >60 mL/min/1.7m2 Final     Comment:     Non  GFR Calc   03/31/2015 72 >60 mL/min/1.7m2 Final     Comment:     Non  GFR Calc     GFR Estimate If Black   Date Value Ref Range Status   12/19/2016 79 >60 mL/min/1.7m2 Final   05/25/2016 89 >60 mL/min/1.7m2 Final     Comment:      GFR Calc   03/31/2015 87 >60 mL/min/1.7m2 Final     Comment:      GFR Calc     Lab Results   Component Value Date    CHOL 134 12/19/2016     Lab Results   Component Value Date    HDL 58 12/19/2016     Lab Results   Component Value Date    LDL 21 12/19/2016     Lab Results   Component Value Date    TRIG 274 12/19/2016     Lab Results   Component Value Date    CHOLHDLRATIO 2.0 09/18/2015     Lab Results   Component Value Date    AST 29 12/19/2016     Lab Results   Component Value Date    ALT 58 12/19/2016     Lab Results   Component Value Date    A1C 10.9 12/19/2016    A1C 10.8 05/25/2016    A1C 10.6 03/21/2016    A1C 10.0 11/09/2015    A1C 9.8 08/19/2015     Potassium   Date Value Ref Range Status   12/19/2016 4.3 3.4 - 5.3 mmol/L Final     Medication filled  per  protocol.      Zoraida Min RN, [facility]

## 2017-02-22 NOTE — TELEPHONE ENCOUNTER
Please inform patient, refill/prescriptioni approved. Please fax prescription to designated pharmacy.    She can take 2 pills a day (100 mg at one time) we can try this for a month and see how this goes. It comes in an extended release version but may not be covered by insurance though.

## 2017-02-22 NOTE — TELEPHONE ENCOUNTER
PREVISIT INFORMATION                                                    Holly Muir scheduled for future visit at [facility].    Patient is scheduled to see Dr. Medrano on 2/24/17.  Reason for visit: Diabetes  Referring provider Dr. Brewer  Has patient seen previous specialist? No  Medical Records:  Available in chart.  Patient was previously seen at a [place] or [facility] facility.     REVIEW                                                      New patient packet mailed to patient: N/A  Medication reconciliation complete: No      Current Outpatient Prescriptions   Medication Sig Dispense Refill     insulin NPH (NOVOLIN N VIAL) 100 UNIT/ML injection 15 units at bedtime 30 mL 2     PARoxetine (PAXIL) 20 MG tablet TAKE 3 TABLETS AT BEDTIME 270 tablet 0     benzonatate (TESSALON) 200 MG capsule Take 1 capsule (200 mg) by mouth 3 times daily as needed for cough 21 capsule 0     simvastatin (ZOCOR) 20 MG tablet TAKE 1 TABLET (20 MG) BY MOUTH AT BEDTIME 90 tablet 3     gabapentin (NEURONTIN) 100 MG capsule TAKE 1 CAPSULE (100 MG) BY MOUTH 3 TIMES DAILY 90 capsule 3     insulin aspart (NOVOLOG FLEXPEN) 100 UNIT/ML injection Use up to 10 units a day to supplement V-Go 1 Month 1     wearable insulin delivery device (V-GO 40) kit USE AS DIRECTED 30 each 2     metFORMIN (GLUCOPHAGE) 1000 MG tablet TAKE 1 TABLET BY MOUTH 2 TIMES DAILY WITH MEALS 60 tablet 5     BusPIRone HCl 30 MG TABS TAKE 1 TABLET BY MOUTH TWICE A DAY 60 tablet 0     NOVOLOG VIAL 100 UNIT/ML soln INJECT SUBCUTANEOUSLY UP TO 76 UNITS A DAY 30 mL 0     propranolol (INDERAL LA) 60 MG capsule Take 1 capsule (60 mg) by mouth daily May take a second dose if tremor recurs. 60 capsule 11     traMADol (ULTRAM) 50 MG tablet TAKE 1 TABLET BY MOUTH TWICE A DAY AS NEEDED FOR MODERATE PAIN 60 tablet 3     enalapril (VASOTEC) 2.5 MG tablet TAKE 1 TABLET BY MOUTH EVERY DAY 90 tablet 2     ACCU-CHEK CELIA PLUS test strip USE  "TO TEST BLOOD SUGARS 8 TIMES DAILY OR AS DIRECTED. 250 each 3     omeprazole (PRILOSEC) 40 MG capsule Take 1 capsule (40 mg) by mouth daily Take 30-60 minutes before a meal. (Patient taking differently: Take 40 mg by mouth daily Take 2 tabs dailyTake 30-60 minutes before a meal.) 90 capsule 3     aspirin 81 MG tablet Take 1 tablet (81 mg) by mouth daily 90 tablet 3     nystatin (MYCOSTATIN) 311475 UNIT/GM POWD Apply 1 dose topically 3 times daily as needed 60 g 3     buPROPion (WELLBUTRIN XL) 150 MG 24 hr tablet TAKE 3 TABLETS (450 MG) BY MOUTH EVERY MORNING 270 tablet 1     ABILIFY 15 MG tablet TAKE 1 TABLET BY MOUTH AT BEDTIME 90 tablet 1     blood glucose monitoring (ACCU-CHEK CELIA) test strip Use to test blood sugars 8 times daily or as directed. 250 each 3     insulin aspart (NOVOLOG FLEXPEN) 100 UNIT/ML soln Use to supplement V-Go 40.  Up to 10 units a day. 1 Month 1     insulin syringe-needle U-100 (BD INSULIN SYRINGE ULTRAFINE) 31G X 5/16\" 0.3 ML Use one syringe daily or as directed. 100 each prn     insulin pen needle (B-D U/F) 31G X 5 MM Use 1 daily or as directed. 100 each prn     blood glucose monitoring (ACCU-CHEK FASTCLIX) lancets 1 each 8 times daily Use to test blood sugar 8 times daily or as directed. 3 Box prn     Blood Glucose Calibration (ACCU-CHEK INSTANT CONTROL) LIQD 1 Application as needed 1 Bottle prn     ORDER FOR DME Equipment being ordered:  Ostomy supplies.  Drain Pouch by Valarie #.    Also needs Barrier by Valarie #. 3 Month 4     insulin pen needle needle 3 Box See Admin Instructions Use four times a day times a day 3 Box 3     Cholecalciferol (VITAMIN D) 1000 UNITS capsule Take 2,000 to 4,000 Units per day 90 capsule      ORDER FOR DME Equipment being ordered: Cane 1 Units 0     B Complex Vitamins (B-COMPLEX/B-12 PO) Take 1 tablet by mouth daily.        ibuprofen (ADVIL,MOTRIN) 200 MG tablet Take 3-4 tablets by mouth every 4 hours as needed.        UNIVERSAL REMOVER " WIPES EX MISC uses to remove barrier from colostomy bag at time of change 1 Each 3     SKIN PREP WIPES MISC to be used with colostomy care 1 Bottle 3     MULTI-VITAMIN OR TABS 1 TABLET DAILY         Allergies: Morphine sulfate        PLAN/FOLLOW-UP NEEDED                                                      Previsit review complete.  Patient will see provider at future scheduled appointment.     Patient Reminders Given:  Please, make sure you bring an updated list of your medications.   If you are having a procedure, please, present 15 minutes early.  If you need to cancel or reschedule,please call [phone].    Argelia Maciel

## 2017-02-22 NOTE — TELEPHONE ENCOUNTER
[facility] Call Center    Phone Message    Name of Caller: MEREDITH from [facility]    Phone Number: Other phone number:  [phone]    Best time to return call: TODAY    May a detailed message be left on voicemail: no    Relation to patient: Other Name: MEREDITH  Relationship:  Home Care  Is there legal documentation in chart to discuss information with this person: No:  Gather information or concern from the caller.  Document in the note but do NOT release any information to the person(s).  Then send message to appropriate person, as requested by the caller.      Reason for Call: Other: Meredith has a question regarding pain control for patient who is taking Tramadol 50mg.  They are wanting to discuss another medication for patient or to increase the Tramadol.  Please call.      Action Taken: Message routed to:  Primary Care p [phone]

## 2017-02-23 ENCOUNTER — VIRTUAL VISIT (OUTPATIENT)
Dept: EDUCATION SERVICES | Facility: CLINIC | Age: 62
End: 2017-02-23

## 2017-02-23 DIAGNOSIS — E11.65 TYPE 2 DIABETES MELLITUS WITH HYPERGLYCEMIA, WITH LONG-TERM CURRENT USE OF INSULIN (H): Primary | ICD-10-CM

## 2017-02-23 DIAGNOSIS — Z79.4 TYPE 2 DIABETES MELLITUS WITH HYPERGLYCEMIA, WITH LONG-TERM CURRENT USE OF INSULIN (H): Primary | ICD-10-CM

## 2017-02-23 NOTE — Clinical Note
David Degroot - I see that you have connected with Holly and have been following her blood sugars.  I will defer her care to you and endocrinology.  Let me know if you have any questions.  Maria T Elias RD, LD, CDE

## 2017-02-23 NOTE — MR AVS SNAPSHOT
After Visit Summary   2/23/2017    Holly Muir    MRN: [id number]           Patient Information     Date Of Birth          1955        Visit Information        Provider Department      2/23/2017 11:15 AM [facility]        Today's Diagnoses     Type 2 diabetes mellitus with hyperglycemia, with long-term current use of insulin (H)    -  1       Follow-ups after your visit        Your next 10 appointments already scheduled     Mar 08, 2017  3:15 PM CST   Telephone Visit with [facility] [place] Provider   [facility])    [id number] [place] [id number] [phone]           Note: this is not an onsite visit; there is no need to come to the facility.            May 26, 2017  9:30 AM CDT   Return Visit with Elizabeth Medrano MD   [facility])    [address] [place] [id number] [phone]              Who to contact     If you have questions or need follow up information about today's clinic visit or your schedule please contact [facility] directly at [phone].  Normal or non-critical lab and imaging results will be communicated to you by MyChart, letter or phone within 4 business days after the clinic has received the results. If you do not hear from us within 7 days, please contact the clinic through MyChart or phone. If you have a critical or abnormal lab result, we will notify you by phone as soon as possible.  Submit refill requests through Adv[employer] or call your pharmacy and they will forward the refill request to us. Please allow 3 business days for your refill to be completed.          Additional Information About Your Visit        [place] [employer] Information     Advaction lets you send messages to your doctor, view your test results, renew your prescriptions, schedule appointments and more. To sign up, go to  [email or web address] . Click on \"Log in\" on the left side of the screen, which will take you to the Welcome page. Then click on \"Sign up Now\" on the right side of the page.     You will be asked to enter the access code listed below, as well as some personal information. Please follow the directions to create your username and password.     Your access code is: [email or web address]  Expires: 2017  1:45 PM     Your access code will  in 90 days. If you need help or a new code, please call your [place] clinic or [phone].        Care EveryWhere ID     This is your Care EveryWhere ID. This could be used by other organizations to access your [place] medical records  [id number]         Blood Pressure from Last 3 Encounters:   17 118/66   17 123/64   16 103/69    Weight from Last 3 Encounters:   17 194 lb 3.2 oz (88.1 kg)   17 192 lb (87.1 kg)   16 189 lb 4.8 oz (85.9 kg)              Today, you had the following     No orders found for display         Today's Medication Changes          These changes are accurate as of: 17 11:59 PM.  If you have any questions, ask your nurse or doctor.               These medicines have changed or have updated prescriptions.        Dose/Directions    omeprazole 40 MG capsule   Commonly known as:  priLOSEC   This may have changed:  additional instructions   Used for:  Gastroesophageal reflux disease without esophagitis        Dose:  40 mg   Take 1 capsule (40 mg) by mouth daily Take 30-60 minutes before a meal.   Quantity:  90 capsule   Refills:  3                Primary Care Provider Office Phone # Fax #    Tiffany King [phone]       [facility] [employer] [address] [facility] [address]        Goals        General    I will continue to monitor my blood sugars and treat the results as directed. (pt-stated)     Notes - Note created  2015 10:26 AM by Randy Suarez RN    As of today's date 2015 goal is " met at 0 - 25%.   Goal Status:  Active          Thank you!     Thank you for choosing [facility]  for your care. Our goal is always to provide you with excellent care. Hearing back from our patients is one way we can continue to improve our services. Please take a few minutes to complete the written survey that you may receive in the mail after your visit with us. Thank you!             Your Updated Medication List - Protect others around you: Learn how to safely use, store and throw away your medicines at www.disposemymeds.org.          This list is accurate as of: 2/23/17 11:59 PM.  Always use your most recent med list.                   Brand Name Dispense Instructions for use    ABILIFY 15 MG tablet   Generic drug:  ARIPiprazole     90 tablet    TAKE 1 TABLET BY MOUTH AT BEDTIME       ACCU-CHEK INSTANT CONTROL Liqd     1 Bottle    1 Application as needed       aspirin 81 MG tablet     90 tablet    Take 1 tablet (81 mg) by mouth daily       B-COMPLEX/B-12 PO      Take 1 tablet by mouth daily.       benzonatate 200 MG capsule    TESSALON    21 capsule    Take 1 capsule (200 mg) by mouth 3 times daily as needed for cough       blood glucose monitoring test strip    ACCU-CHEK CELIA    250 each    Use to test blood sugars 8 times daily or as directed.       buPROPion 150 MG 24 hr tablet    WELLBUTRIN XL    270 tablet    TAKE 3 TABLETS (450 MG) BY MOUTH EVERY MORNING       BusPIRone HCl 30 MG Tabs     60 tablet    TAKE 1 TABLET BY MOUTH TWICE A DAY       enalapril 2.5 MG tablet    VASOTEC    90 tablet    TAKE 1 TABLET BY MOUTH EVERY DAY       gabapentin 300 MG capsule    NEURONTIN    90 capsule    Take 1 capsule (300 mg) by mouth 3 times daily       ibuprofen 200 MG tablet    ADVIL/MOTRIN     Take 3-4 tablets by mouth every 4 hours as needed Reported on 2/24/2017       * insulin aspart 100 UNIT/ML injection    NovoLOG FLEXPEN    1 Month    Use up to 10 units a day to supplement V-Go       * insulin  "aspart 100 UNITS/ML injection    NovoLOG VIAL    10 mL    INJECT SUBCUTANEOUSLY UP TO 76 UNITS A DAY       insulin  UNIT/ML injection    NovoLIN N VIAL    30 mL    15 units at bedtime       * insulin pen needle 31G X 5 MM     3 Box    3 Box See Admin Instructions Use four times a day times a day       * insulin pen needle 31G X 5 MM    B-D U/F    100 each    Use 1 daily or as directed.       insulin syringe-needle U-100 31G X 5/16\" 0.3 ML    BD insulin syringe ultrafine    100 each    Use one syringe daily or as directed.       metFORMIN 1000 MG tablet    GLUCOPHAGE    60 tablet    TAKE 1 TABLET BY MOUTH 2 TIMES DAILY WITH MEALS       Multi-vitamin Tabs tablet   Generic drug:  multivitamin, therapeutic with minerals      1 TABLET DAILY       nystatin 985537 UNIT/GM Powd    MYCOSTATIN    60 g    Apply 1 dose topically 3 times daily as needed       omeprazole 40 MG capsule    priLOSEC    90 capsule    Take 1 capsule (40 mg) by mouth daily Take 30-60 minutes before a meal.       order for DME     1 Units    Equipment being ordered: Cane       order for DME     3 Month    Equipment being ordered:  Ostomy supplies.  Drain Pouch by Valarie #.    Also needs Barrier by [place] #.       PARoxetine 20 MG tablet    PAXIL    270 tablet    TAKE 3 TABLETS AT BEDTIME       propranolol 60 MG 24 hr capsule    INDERAL LA    60 capsule    Take 1 capsule (60 mg) by mouth daily May take a second dose if tremor recurs.       simvastatin 20 MG tablet    ZOCOR    90 tablet    TAKE 1 TABLET (20 MG) BY MOUTH AT BEDTIME       SKIN PREP WIPES Misc     1 Bottle    to be used with colostomy care       traMADol 50 MG tablet    ULTRAM    60 tablet    Take 1-2 tablets ( mg) by mouth daily as needed for moderate pain       Universal Remover Wipes Misc     1 Each    uses to remove barrier from colostomy bag at time of change       vitamin D 1000 UNITS capsule     90 capsule    Take 2,000 to 4,000 Units per day       " wearable insulin delivery device kit     30 each    USE AS DIRECTED       * Notice:  This list has 4 medication(s) that are the same as other medications prescribed for you. Read the directions carefully, and ask your doctor or other care provider to review them with you.

## 2017-02-23 NOTE — PROGRESS NOTES
Unable to reach Holly on 2/23/17.  Will try again in 1 week.    Maria T Elias RD, MARCIANO, LEORA      Reached Holly on 3/2/17.  She reports she was seen by endocrinology and her insulin plan was changed.  She is being followed by Mikayla Sellers RN, LEORA, who is reaching out to Holly weekly to review her blood sugars and adjust her insulin doses.  Will defer Holly's diabetes care to endocrinology at this time.    Maria T Elias RD, MARCIANO, LEORA

## 2017-02-24 ENCOUNTER — CARE COORDINATION (OUTPATIENT)
Dept: CARE COORDINATION | Facility: CLINIC | Age: 62
End: 2017-02-24

## 2017-02-24 ENCOUNTER — OFFICE VISIT (OUTPATIENT)
Dept: ENDOCRINOLOGY | Facility: CLINIC | Age: 62
End: 2017-02-24
Attending: INTERNAL MEDICINE
Payer: MEDICARE

## 2017-02-24 ENCOUNTER — OFFICE VISIT (OUTPATIENT)
Dept: NURSING | Facility: CLINIC | Age: 62
End: 2017-02-24
Payer: MEDICARE

## 2017-02-24 VITALS
HEIGHT: 62 IN | SYSTOLIC BLOOD PRESSURE: 118 MMHG | BODY MASS INDEX: 35.74 KG/M2 | DIASTOLIC BLOOD PRESSURE: 66 MMHG | HEART RATE: 77 BPM | WEIGHT: 194.2 LBS

## 2017-02-24 DIAGNOSIS — Z79.4 TYPE 2 DIABETES MELLITUS WITH HYPOGLYCEMIA WITHOUT COMA, WITH LONG-TERM CURRENT USE OF INSULIN (H): Primary | ICD-10-CM

## 2017-02-24 DIAGNOSIS — E11.40 POORLY CONTROLLED TYPE 2 DIABETES MELLITUS WITH NEUROPATHY (H): ICD-10-CM

## 2017-02-24 DIAGNOSIS — E11.9 DIABETES MELLITUS WITHOUT COMPLICATION (H): Primary | ICD-10-CM

## 2017-02-24 DIAGNOSIS — Z91.199 POOR COMPLIANCE: ICD-10-CM

## 2017-02-24 DIAGNOSIS — E11.65 TYPE 2 DIABETES MELLITUS WITH HYPERGLYCEMIA (H): ICD-10-CM

## 2017-02-24 DIAGNOSIS — E11.649 TYPE 2 DIABETES MELLITUS WITH HYPOGLYCEMIA WITHOUT COMA, WITH LONG-TERM CURRENT USE OF INSULIN (H): Primary | ICD-10-CM

## 2017-02-24 DIAGNOSIS — E11.65 POORLY CONTROLLED TYPE 2 DIABETES MELLITUS WITH NEUROPATHY (H): ICD-10-CM

## 2017-02-24 PROCEDURE — 99215 OFFICE O/P EST HI 40 MIN: CPT | Performed by: INTERNAL MEDICINE

## 2017-02-24 PROCEDURE — G0108 DIAB MANAGE TRN  PER INDIV: HCPCS

## 2017-02-24 RX ORDER — LANCETS
EACH MISCELLANEOUS
Qty: 9 BOX | Refills: 1 | Status: SHIPPED | OUTPATIENT
Start: 2017-02-24 | End: 2018-08-17

## 2017-02-24 RX ORDER — INSULIN GLARGINE 100 [IU]/ML
INJECTION, SOLUTION SUBCUTANEOUS
Qty: 30 ML | Refills: 1 | Status: SHIPPED | OUTPATIENT
Start: 2017-02-24 | End: 2017-05-05

## 2017-02-24 NOTE — PROGRESS NOTES
Clinic Care Coordination Contact - Social Work - Follow-Up - 2-24-17  Care Team Conversations    SW followed up with pt via phone today.  Pt sounded bright during SW contact today.  Pt continues to have [facility] RN, Adams, visit her weekly to set up her medications and pt reports that this works out well for pt.  Pt continues to see her psychiatrist at [facility] in [place] and has an appt next week.  Pt also saw her counselor at [facility] 2 weeks ago and plans to set schedule her next appt there.  Pt feels that these visits are helpful for her.  Pt has [employer] for transportation, if she needs it.  SW provided active listening, affirmations, and support to pt during contact.  SW will follow up with pt again in 4 weeks.       ALMA Myrick  Care Coordinator - Social Work  [facility]  Office:  [phone]  2/24/2017 3:00 PM

## 2017-02-24 NOTE — PROGRESS NOTES
Diabetes Self Management Training: Individual Review Visit    Holly Muir presents today for education related to Type 2 diabetes. *Please note: this patient met with Dr Elizabeth Medrano. Today. She was referred to Endo by her PCP for diabetes mgmt. Dr Medrano asked if I could meet with her today to review transitioning from V-go pump therapy to insulin injection therapy. Pt has a Hx of elevated A1c levels for several years: 8-11%. Bg control did not improve after starting V-Go therapy 1 hear ago.     She is accompanied by self    Patient's diabetes management related comments/concerns: improving bg control.     Patient's emotional response to diabetes: Expresses readiness to learn    Patient would like this visit to be focused around the following diabetes-related behaviors and goals: Taking Medication and Monitoring    ASSESSMENT:  Patient Problem List and Family Medical History reviewed for relevant medical history, current medical status, and diabetes risk factors.    Current Diabetes Management per Patient:  Taking diabetes medications? Yes -- see below. Pt to be taken off of V-Go pump. Will be transitioned back to basal/bolus injection therapy.        Diabetes Medication(s)     Biguanides Sig    metFORMIN (GLUCOPHAGE) 1000 MG tablet TAKE 1 TABLET BY MOUTH 2 TIMES DAILY WITH MEALS    Insulin Sig    insulin aspart (NOVOLOG FLEXPEN) 100 UNIT/ML injection Take as directed with meals. Total daily dose approx 30 units.    insulin glargine (BASAGLAR KWIKPEN) 100 UNIT/ML injection Take 30 units every night at about the same time.    insulin aspart (NOVOLOG VIAL) 100 UNITS/ML injection INJECT SUBCUTANEOUSLY UP TO 76 UNITS A DAY    insulin NPH (NOVOLIN N VIAL) 100 UNIT/ML injection 15 units at bedtime    insulin aspart (NOVOLOG FLEXPEN) 100 UNIT/ML injection Use up to 10 units a day to supplement V-Go     Patient not taking:  Reported on 2/24/2017    insulin aspart (NOVOLOG FLEXPEN) 100 UNIT/ML soln Use to supplement  "V-Go 40.  Up to 10 units a day.     Patient not taking:  Reported on 2/24/2017        BG self monitoring as follows: four-eight times daily.   BG meter: Accu-Chek [employer] Connect meter  BG results: >75% or bg readings are elevated over a 24 hour period.      BG values are: Not in goal  Patient's most recent   Lab Results   Component Value Date    A1C 10.9 12/19/2016    is not meeting goal of <7.0    Nutrition:  Patient eats 3 meals per day    Breakfast - smallest meal of the day- toast with peanut butter.   Lunch - burrito   Dinner - meat with rice and vegetable   Snacks - rarely    Cultural/Amish diet restrictions: No     Biggest Challenge to Healthy Eating: none    Physical Activity:    Not discussed today.    Diabetes Risk Factors:  age over 45 years and overweight/obesity    Diabetes Complications:  Not discussed today.    Vitals:  There were no vitals taken for this visit.  Estimated body mass index is 35.52 kg/(m^2) as calculated from the following:    Height as of an earlier encounter on 2/24/17: 1.575 m (5' 2\").    Weight as of an earlier encounter on 2/24/17: 88.1 kg (194 lb 3.2 oz).   Last 3 BP:   BP Readings from Last 3 Encounters:   02/24/17 118/66   01/19/17 123/64   12/19/16 103/69       History   Smoking Status     Former Smoker     Quit date: 8/9/2016   Smokeless Tobacco     Never Used       Labs:  Lab Results   Component Value Date    A1C 10.9 12/19/2016     Lab Results   Component Value Date     12/19/2016     Lab Results   Component Value Date    LDL 21 12/19/2016     HDL Cholesterol   Date Value Ref Range Status   12/19/2016 58 >49 mg/dL Final   ]  GFR Estimate   Date Value Ref Range Status   12/19/2016 66 >60 mL/min/1.7m2 Final     GFR Estimate If Black   Date Value Ref Range Status   12/19/2016 79 >60 mL/min/1.7m2 Final     Lab Results   Component Value Date    CR 0.88 12/19/2016     No results found for: MICROALBUMIN    Socio/Economic Considerations:    Support system: " family    Health Beliefs and Attitudes:   Patient Activation Measure Survey Score:  RADHA Score (Last Two) 9/15/2011 1/5/2012   RADHA Raw Score 30 35   Activation Score 37.3 45.2   RADHA Level 1 1       Stage of Change: CONTEMPLATION (Considering change and yet undecided)      Diabetes knowledge and skills assessment:     Patient is knowledgeable in diabetes management concepts related to: Not discussed today.     Patient needs further education on the following diabetes management concepts: Will assess in several weeks after having  transitioned back to multiple daily injection therapy.      Barriers to Learning Assessment: No Barriers identified    Based on learning assessment above, most appropriate setting for further diabetes education would be: Individual setting.    INTERVENTION:   Education provided today on:  AADE Self-Care Behaviors:  Monitoring: individual blood glucose targets: < 120 fasting, < 180 pre-meal and bedtime. Frequency of monitoring: pre-meal and at bedtime. Can do additional bg checks if feeling low. If wanting to check bg level post meal, wait 2 hours after injection was given.    Taking Medication: action of prescribed medication - action of Basaglar discussed (basal insulin). Basal dose reviewed: 30 units every night at about 9pm. Action of Novolog discussed (bolus insulin). Meal doses reviewed: 5/8/8. Sliding Scale reviewed. Add 2 units if pre-meal bg > 250, add 4 units if > 300.    Reviewed proper storage of insulin pens.   Rx's for basal and bolus insulin faxed to pharmacy as well as Rx's for test strips and lancets.     Opportunities for ongoing education and support in diabetes-self management were discussed.    Pt verbalized understanding of concepts discussed and recommendations provided today.       Education Materials Provided:  No new materials provided today    PLAN:  Take 1st dose Basaglar - 30 units- tonight at about 9pm when V-go pump is discontinued,  Take 1st dose Novolog with a  meal, starting with breakfast tomorrow. Can take a correction dose of Novolog this evening if bedtime if Bg > 250.   Check Bg consistently four times a day: pre-meal    FOLLOW-UP:  F/u appt made in May to see Endo    CDE will call pt next Wed for a bg report.     Time Spent: 60 minutes  Encounter Type: Individual    Mikayla Sellers RN, BSN, CDE   [facility]

## 2017-02-24 NOTE — PATIENT INSTRUCTIONS
1. Basaglar: (once every 24 hours). Take first dose beginning Friday night, Feb 24th. Take 30 units each night.   2. Novolog (take with each meal): Take first dose with breakfast stating Friday, Feb 24th. Dose: 5 units with Breakfast, 8 units with Lunch, 8 units with dinner.  Sliding Scale: add 2 units if pre-meal Bg >250 add 4 units if pre-meal Bg > 300.   3. Check Bg four times a day: before each meal and before bedtime.  Bg target goals: Fasting: goal bg < 120. Pre-meal: L and D and before Bedtime: <180.   4. Store unopened insulin pens in the refrigerator, Once opened pen are to kept at room temp. Dispose of pens every 30 days.     Mikayla Sellers, RN, BSN, CDE   [facility]

## 2017-02-24 NOTE — MR AVS SNAPSHOT
After Visit Summary   2/24/2017    Holly Muir    MRN: [id number]           Patient Information     Date Of Birth          1955        Visit Information        Provider Department      2/24/2017 8:00 AM Elizabeth Medrano MD [facility]        Today's Diagnoses     Type 2 diabetes mellitus with hypoglycemia without coma, with long-term current use of insulin (H)    -  1    Poor compliance        Poorly controlled type 2 diabetes mellitus with neuropathy (H)           Follow-ups after your visit        Follow-up notes from your care team     Return in about 2 months (around 4/24/2017).      Your next 10 appointments already scheduled     Mar 01, 2017  1:00 PM CST   Telephone Visit with Mg Cde Provider   [facility])    [address] [place] [id number] [phone]           Note: this is not an onsite visit; there is no need to come to the facility.            May 26, 2017  9:30 AM CDT   Return Visit with Elizabeth Medrano MD   [facility])    [address] [place] [id number] [phone]              Who to contact     If you have questions or need follow up information about today's clinic visit or your schedule please contact [facility] directly at [phone].  Normal or non-critical lab and imaging results will be communicated to you by MyChart, letter or phone within 4 business days after the clinic has received the results. If you do not hear from us within 7 days, please contact the clinic through MyChart or phone. If you have a critical or abnormal lab result, we will notify you by phone as soon as possible.  Submit refill requests through [employer] or call your pharmacy and they will forward the refill request to us. Please allow 3 business days for your refill to be completed.          Additional Information About Your Visit       "  MyChart Information     [facility] is an electronic gateway that provides easy, online access to your medical records. With [facility], you can request a clinic appointment, read your test results, renew a prescription or communicate with your care team.     To sign up for [facility] visit the website at [email or web address]   You will be asked to enter the access code listed below, as well as some personal information. Please follow the directions to create your username and password.     Your access code is: [email or web address]  Expires: 2017  1:45 PM     Your access code will  in 90 days. If you need help or a new code, please contact your [facility] or call [phone] for assistance.        Care EveryWhere ID     This is your Care EveryWhere ID. This could be used by other organizations to access your [facility] medical records  [id number]        Your Vitals Were     Pulse Height BMI (Body Mass Index)             77 1.575 m (5' 2\") 35.52 kg/m2          Blood Pressure from Last 3 Encounters:   17 118/66   17 123/64   16 103/69    Weight from Last 3 Encounters:   17 88.1 kg (194 lb 3.2 oz)   17 87.1 kg (192 lb)   16 85.9 kg (189 lb 4.8 oz)              Today, you had the following     No orders found for display         Today's Medication Changes          These changes are accurate as of: 17  3:51 PM.  If you have any questions, ask your nurse or doctor.               Start taking these medicines.        Dose/Directions    insulin glargine 100 UNIT/ML injection   Used for:  Type 2 diabetes mellitus, uncontrolled, with retinopathy (H)   Started by:  Elizabeth Medrano MD        Take 30 units every night at about the same time.   Quantity:  30 mL   Refills:  1         These medicines have changed or have updated prescriptions.        Dose/Directions    blood glucose monitoring lancets   This may have changed:    - how much to take  - how to " take this  - when to take this   Used for:  Type 2 diabetes mellitus, uncontrolled (H)   Changed by:  Elizabeth Medrano MD        Use to test blood sugar 8 times daily or as directed.   Quantity:  9 Box   Refills:  1       * blood glucose monitoring test strip   Commonly known as:  ACCU-CHEK CELIA   This may have changed:  Another medication with the same name was changed. Make sure you understand how and when to take each.   Used for:  Type 2 diabetes mellitus, uncontrolled (H)        Use to test blood sugars 8 times daily or as directed.   Quantity:  250 each   Refills:  3       * blood glucose monitoring test strip   Commonly known as:  ACCU-CHEK CELIA PLUS   This may have changed:  See the new instructions.   Used for:  Type 2 diabetes mellitus with hyperglycemia (H)   Changed by:  Elizabeth Medrano MD        Use to test blood sugar 8 times daily or as directed.   Quantity:  700 each   Refills:  1       * insulin aspart 100 UNIT/ML injection   Commonly known as:  NovoLOG FLEXPEN   This may have changed:  Another medication with the same name was changed. Make sure you understand how and when to take each.   Used for:  Uncontrolled type 2 diabetes mellitus with hyperglycemia, with long-term current use of insulin (H)        Use up to 10 units a day to supplement V-Go   Quantity:  1 Month   Refills:  1       * insulin aspart 100 UNITS/ML injection   Commonly known as:  NovoLOG VIAL   This may have changed:  Another medication with the same name was changed. Make sure you understand how and when to take each.   Used for:  Uncontrolled type 2 diabetes mellitus with hyperglycemia, with long-term current use of insulin (H)   Changed by:  Tiffany King MD        INJECT SUBCUTANEOUSLY UP TO 76 UNITS A DAY   Quantity:  10 mL   Refills:  0       * insulin aspart 100 UNIT/ML injection   Commonly known as:  NovoLOG FLEXPEN   This may have changed:  additional instructions   Used for:  Type 2 diabetes mellitus, uncontrolled, with  retinopathy (H)   Changed by:  Elizabeth Medrano MD        Take as directed with meals. Total daily dose approx 30 units.   Quantity:  30 mL   Refills:  1       omeprazole 40 MG capsule   Commonly known as:  priLOSEC   This may have changed:  additional instructions   Used for:  Gastroesophageal reflux disease without esophagitis        Dose:  40 mg   Take 1 capsule (40 mg) by mouth daily Take 30-60 minutes before a meal.   Quantity:  90 capsule   Refills:  3       * Notice:  This list has 5 medication(s) that are the same as other medications prescribed for you. Read the directions carefully, and ask your doctor or other care provider to review them with you.         Where to get your medicines      These medications were sent to [facility]/pharmacy #[id number] - [place] - [address] [address] [place] [address]     Phone:  [phone]     blood glucose monitoring lancets    blood glucose monitoring test strip    insulin aspart 100 UNIT/ML injection    insulin glargine 100 UNIT/ML injection                Primary Care Provider Office Phone # Fax #    Tiffany King [phone]       [facility] [address]  [facility] [address]        Goals        General    I will continue to monitor my blood sugars and treat the results as directed. (pt-stated)     Notes - Note created  12/9/2015 10:26 AM by Randy Suarez RN    As of today's date 12/9/2015 goal is met at 0 - 25%.   Goal Status:  Active          Thank you!     Thank you for choosing [facility]  for your care. Our goal is always to provide you with excellent care. Hearing back from our patients is one way we can continue to improve our services. Please take a few minutes to complete the written survey that you may receive in the mail after your visit with us. Thank you!             Your Updated Medication List - Protect others around you: Learn how to safely use, store and throw  away your medicines at www.disposemymeds.org.          This list is accurate as of: 2/24/17  3:51 PM.  Always use your most recent med list.                   Brand Name Dispense Instructions for use    ABILIFY 15 MG tablet   Generic drug:  ARIPiprazole     90 tablet    TAKE 1 TABLET BY MOUTH AT BEDTIME       ACCU-CHEK INSTANT CONTROL Liqd     1 Bottle    1 Application as needed       aspirin 81 MG tablet     90 tablet    Take 1 tablet (81 mg) by mouth daily       B-COMPLEX/B-12 PO      Take 1 tablet by mouth daily.       benzonatate 200 MG capsule    TESSALON    21 capsule    Take 1 capsule (200 mg) by mouth 3 times daily as needed for cough       blood glucose monitoring lancets     9 Box    Use to test blood sugar 8 times daily or as directed.       * blood glucose monitoring test strip    ACCU-CHEK CELIA    250 each    Use to test blood sugars 8 times daily or as directed.       * blood glucose monitoring test strip    ACCU-CHEK CELIA PLUS    700 each    Use to test blood sugar 8 times daily or as directed.       buPROPion 150 MG 24 hr tablet    WELLBUTRIN XL    270 tablet    TAKE 3 TABLETS (450 MG) BY MOUTH EVERY MORNING       BusPIRone HCl 30 MG Tabs     60 tablet    TAKE 1 TABLET BY MOUTH TWICE A DAY       enalapril 2.5 MG tablet    VASOTEC    90 tablet    TAKE 1 TABLET BY MOUTH EVERY DAY       gabapentin 300 MG capsule    NEURONTIN    90 capsule    Take 1 capsule (300 mg) by mouth 3 times daily       ibuprofen 200 MG tablet    ADVIL/MOTRIN     Take 3-4 tablets by mouth every 4 hours as needed Reported on 2/24/2017       * insulin aspart 100 UNIT/ML injection    NovoLOG FLEXPEN    1 Month    Use up to 10 units a day to supplement V-Go       * insulin aspart 100 UNITS/ML injection    NovoLOG VIAL    10 mL    INJECT SUBCUTANEOUSLY UP TO 76 UNITS A DAY       * insulin aspart 100 UNIT/ML injection    NovoLOG FLEXPEN    30 mL    Take as directed with meals. Total daily dose approx 30 units.       insulin glargine  "100 UNIT/ML injection     30 mL    Take 30 units every night at about the same time.       insulin  UNIT/ML injection    NovoLIN N VIAL    30 mL    15 units at bedtime       * insulin pen needle 31G X 5 MM     3 Box    3 Box See Admin Instructions Use four times a day times a day       * insulin pen needle 31G X 5 MM    B-D U/F    100 each    Use 1 daily or as directed.       insulin syringe-needle U-100 31G X 5/16\" 0.3 ML    BD insulin syringe ultrafine    100 each    Use one syringe daily or as directed.       metFORMIN 1000 MG tablet    GLUCOPHAGE    60 tablet    TAKE 1 TABLET BY MOUTH 2 TIMES DAILY WITH MEALS       Multi-vitamin Tabs tablet   Generic drug:  multivitamin, therapeutic with minerals      1 TABLET DAILY       nystatin 163112 UNIT/GM Powd    MYCOSTATIN    60 g    Apply 1 dose topically 3 times daily as needed       omeprazole 40 MG capsule    priLOSEC    90 capsule    Take 1 capsule (40 mg) by mouth daily Take 30-60 minutes before a meal.       order for DME     1 Units    Equipment being ordered: Cane       order for DME     3 Month    Equipment being ordered:  Ostomy supplies.  Drain Pouch by [employer] #.    Also needs Barrier by [employer] #.       PARoxetine 20 MG tablet    PAXIL    270 tablet    TAKE 3 TABLETS AT BEDTIME       propranolol 60 MG 24 hr capsule    INDERAL LA    60 capsule    Take 1 capsule (60 mg) by mouth daily May take a second dose if tremor recurs.       simvastatin 20 MG tablet    ZOCOR    90 tablet    TAKE 1 TABLET (20 MG) BY MOUTH AT BEDTIME       SKIN PREP WIPES Misc     1 Bottle    to be used with colostomy care       traMADol 50 MG tablet    ULTRAM    60 tablet    Take 1-2 tablets ( mg) by mouth daily as needed for moderate pain       Universal Remover Wipes Misc     1 Each    uses to remove barrier from colostomy bag at time of change       vitamin D 1000 UNITS capsule     90 capsule    Take 2,000 to 4,000 Units per day       wearable insulin " delivery device kit     30 each    USE AS DIRECTED       * Notice:  This list has 7 medication(s) that are the same as other medications prescribed for you. Read the directions carefully, and ask your doctor or other care provider to review them with you.

## 2017-02-24 NOTE — NURSING NOTE
"Holly Muir's goals for this visit include: .refv    She requests these members of her care team be copied on today's visit information: Tiffany King      PCP: Tiffany King    Referring Provider:  [facility] King [facility] [place]  [address] [place] [address]    Chief Complaint   Patient presents with     Diabetes       Initial /66  Pulse 77  Ht 1.575 m (5' 2\")  Wt 88.1 kg (194 lb 3.2 oz)  BMI 35.52 kg/m2 Estimated body mass index is 35.52 kg/(m^2) as calculated from the following:    Height as of this encounter: 1.575 m (5' 2\").    Weight as of this encounter: 88.1 kg (194 lb 3.2 oz).  Medication Reconciliation: complete    Do you need any medication refills at today's visit? No    Sunshine Styles LPN      "

## 2017-02-24 NOTE — MR AVS SNAPSHOT
After Visit Summary   2/24/2017    Holly Muir    MRN: [id number]           Patient Information     Date Of Birth          1955        Visit Information        Provider Department      2/24/2017 9:15 AM Provider, [facility]        Care Instructions    1. Basaglar: (once every 24 hours). Take first dose beginning Friday night, Feb 24th. Take 30 units each night.   2. Novolog (take with each meal): Take first dose with breakfast stating Friday, Feb 24th. Dose: 5 units with Breakfast, 8 units with Lunch, 8 units with dinner.  Sliding Scale: add 2 units if pre-meal Bg >250 add 4 units if pre-meal Bg > 300.   3. Check Bg four times a day: before each meal and before bedtime.  Bg target goals: Fasting: goal bg < 120. Pre-meal: L and D and before Bedtime: <180.   4. Store unopened insulin pens in the refrigerator, Once opened pen are to kept at room temp. Dispose of pens every 30 days.     Mikayla Sellers, RN, BSN, CDE   [facility]        Follow-ups after your visit        Who to contact     If you have questions or need follow up information about today's clinic visit or your schedule please contact [facility] directly at [phone].  Normal or non-critical lab and imaging results will be communicated to you by [employer], letter or phone within 4 business days after the clinic has received the results. If you do not hear from us within 7 days, please contact the clinic through [employer] or phone. If you have a critical or abnormal lab result, we will notify you by phone as soon as possible.  Submit refill requests through [employer] or call your pharmacy and they will forward the refill request to us. Please allow 3 business days for your refill to be completed.          Additional Information About Your Visit        [employer] Information     [employer] is an electronic gateway that provides easy, online access to your medical records. With  [employer], you can request a clinic appointment, read your test results, renew a prescription or communicate with your care team.     To sign up for [employer] visit the website at [email or web address]   You will be asked to enter the access code listed below, as well as some personal information. Please follow the directions to create your username and password.     Your access code is: [email or web address]  Expires: 2017  1:45 PM     Your access code will  in 90 days. If you need help or a new code, please contact your [facility] or call [phone] for assistance.        Care EveryWhere ID     This is your Care EveryWhere ID. This could be used by other organizations to access your [facility] medical records  [id number]         Blood Pressure from Last 3 Encounters:   17 118/66   17 123/64   16 103/69    Weight from Last 3 Encounters:   17 88.1 kg (194 lb 3.2 oz)   17 87.1 kg (192 lb)   16 85.9 kg (189 lb 4.8 oz)              Today, you had the following     No orders found for display         Today's Medication Changes          These changes are accurate as of: 17  9:40 AM.  If you have any questions, ask your nurse or doctor.               These medicines have changed or have updated prescriptions.        Dose/Directions    omeprazole 40 MG capsule   Commonly known as:  priLOSEC   This may have changed:  additional instructions   Used for:  Gastroesophageal reflux disease without esophagitis        Dose:  40 mg   Take 1 capsule (40 mg) by mouth daily Take 30-60 minutes before a meal.   Quantity:  90 capsule   Refills:  3                Primary Care Provider Office Phone # Fax #    Tiffany King [phone]       [facility] [address]  [facility] [address]        Goals        General    I will continue to monitor my blood sugars and treat the results as directed. (pt-stated)     Notes - Note created   12/9/2015 10:26 AM by Randy Suarez, RN    As of today's date 12/9/2015 goal is met at 0 - 25%.   Goal Status:  Active        Psychosocial (pt-stated)     Notes - Note edited  5/5/2016 10:35 AM by Kristin Naqvi BSW    As of today's date 5/5/2016 goal is met at 76 - 100%.   Goal Status:  Complete   Pt is certified to use [employer]   As of today's date 4/28/2016 goal is met at 76 - 100%.   Goal Status:  Complete  I want to become certified to use [employer] for transportation   As of today's date 3/4/2016 goal is met at 0 - 25%.   Goal Status:  Active    As of today's date 3/31/2016 goal is met at 26 - 50%.   Goal Status:  Active  [employer] application mailed from clinic on 3-25-16  ALMA Myrick          Thank you!     Thank you for choosing [facility]  for your care. Our goal is always to provide you with excellent care. Hearing back from our patients is one way we can continue to improve our services. Please take a few minutes to complete the written survey that you may receive in the mail after your visit with us. Thank you!             Your Updated Medication List - Protect others around you: Learn how to safely use, store and throw away your medicines at www.disposemymeds.org.          This list is accurate as of: 2/24/17  9:40 AM.  Always use your most recent med list.                   Brand Name Dispense Instructions for use    ABILIFY 15 MG tablet   Generic drug:  ARIPiprazole     90 tablet    TAKE 1 TABLET BY MOUTH AT BEDTIME       ACCU-CHEK INSTANT CONTROL Liqd     1 Bottle    1 Application as needed       aspirin 81 MG tablet     90 tablet    Take 1 tablet (81 mg) by mouth daily       B-COMPLEX/B-12 PO      Take 1 tablet by mouth daily.       benzonatate 200 MG capsule    TESSALON    21 capsule    Take 1 capsule (200 mg) by mouth 3 times daily as needed for cough       blood glucose monitoring lancets     3 Box    1 each 8 times daily Use to test blood sugar 8 times  "daily or as directed.       * blood glucose monitoring test strip    ACCU-CHEK CELIA    250 each    Use to test blood sugars 8 times daily or as directed.       * ACCU-CHEK CELIA PLUS test strip   Generic drug:  blood glucose monitoring     250 each    USE TO TEST BLOOD SUGARS 8 TIMES DAILY OR AS DIRECTED.       buPROPion 150 MG 24 hr tablet    WELLBUTRIN XL    270 tablet    TAKE 3 TABLETS (450 MG) BY MOUTH EVERY MORNING       BusPIRone HCl 30 MG Tabs     60 tablet    TAKE 1 TABLET BY MOUTH TWICE A DAY       enalapril 2.5 MG tablet    VASOTEC    90 tablet    TAKE 1 TABLET BY MOUTH EVERY DAY       gabapentin 300 MG capsule    NEURONTIN    90 capsule    Take 1 capsule (300 mg) by mouth 3 times daily       ibuprofen 200 MG tablet    ADVIL/MOTRIN     Take 3-4 tablets by mouth every 4 hours as needed Reported on 2/24/2017       * insulin aspart 100 UNIT/ML injection    NovoLOG FLEXPEN    1 Month    Use to supplement V-Go 40.  Up to 10 units a day.       * insulin aspart 100 UNIT/ML injection    NovoLOG FLEXPEN    1 Month    Use up to 10 units a day to supplement V-Go       * insulin aspart 100 UNITS/ML injection    NovoLOG VIAL    10 mL    INJECT SUBCUTANEOUSLY UP TO 76 UNITS A DAY       insulin  UNIT/ML injection    NovoLIN N VIAL    30 mL    15 units at bedtime       * insulin pen needle 31G X 5 MM     3 Box    3 Box See Admin Instructions Use four times a day times a day       * insulin pen needle 31G X 5 MM    B-D U/F    100 each    Use 1 daily or as directed.       insulin syringe-needle U-100 31G X 5/16\" 0.3 ML    BD insulin syringe ultrafine    100 each    Use one syringe daily or as directed.       metFORMIN 1000 MG tablet    GLUCOPHAGE    60 tablet    TAKE 1 TABLET BY MOUTH 2 TIMES DAILY WITH MEALS       Multi-vitamin Tabs tablet   Generic drug:  multivitamin, therapeutic with minerals      1 TABLET DAILY       nystatin 269630 UNIT/GM Powd    MYCOSTATIN    60 g    Apply 1 dose topically 3 times daily as " needed       omeprazole 40 MG capsule    priLOSEC    90 capsule    Take 1 capsule (40 mg) by mouth daily Take 30-60 minutes before a meal.       order for DME     1 Units    Equipment being ordered: Cane       order for DME     3 Month    Equipment being ordered:  Ostomy supplies.  Drain Pouch by Valarie #.    Also needs Barrier by Perrin #.       PARoxetine 20 MG tablet    PAXIL    270 tablet    TAKE 3 TABLETS AT BEDTIME       propranolol 60 MG 24 hr capsule    INDERAL LA    60 capsule    Take 1 capsule (60 mg) by mouth daily May take a second dose if tremor recurs.       simvastatin 20 MG tablet    ZOCOR    90 tablet    TAKE 1 TABLET (20 MG) BY MOUTH AT BEDTIME       SKIN PREP WIPES Misc     1 Bottle    to be used with colostomy care       traMADol 50 MG tablet    ULTRAM    60 tablet    Take 1-2 tablets ( mg) by mouth daily as needed for moderate pain       Universal Remover Wipes Misc     1 Each    uses to remove barrier from colostomy bag at time of change       vitamin D 1000 UNITS capsule     90 capsule    Take 2,000 to 4,000 Units per day       wearable insulin delivery device kit     30 each    USE AS DIRECTED       * Notice:  This list has 7 medication(s) that are the same as other medications prescribed for you. Read the directions carefully, and ask your doctor or other care provider to review them with you.

## 2017-02-24 NOTE — PROGRESS NOTES
- Endocrinology Initial Consultation -    Reason for visit/consult:  DM type 2 management    Primary care provider: Tiffany King    HPI: 62 yo female with history of hysterectomy,  Colostomy in 2002, here for DM 2 management. She was diagnosed DM2 in 2000 at PMD office screening, initially she was on oral meds for 3 years then started on insulin. She had been using pens and was switched to V-go insulin. Currently she is on V-go basal 40 product and changing the divide daily, in addition to metfomin. She has been chekcing glucose 3-4 times a day but timing was inconsistent, sometimes before meal and soemtiems after meal, also she admitted he glucose has been high in the morning, and low with symptoms at night, which requires midnight snacks. Usually when she wakes up in the morning, she does not feel hungry because she eats during the night.       DM complications:  Retinopathy: left eye, some finding-per patient.   Nephropathy: 47.15 (12/2016)  Neuropathy: YES  Most recent LDL: 21 (12/2016)    Current regimens  Metformin 1000 mg BID,   Insulin Vego (basal 40 units) changing once daily started last year.   Bolus: sliding scale? Using (she uses number either fasting or post meal)  150: no insulin  200: 2-4 unit  300: 8 unit  (new from 2/24/2017-)  Lantus 30 units,     Novolog 5-8-8     Correction    250-300: 2 unit extra    >300: 4 units extra        Life style:  Wake up 7:30 am  Breakfast 11:00 am (not hugry because of night snack): oatmeal (20%)  Lunch 2pm: bread peanuts butter, burrito (40%)  Dinner: 6pm boiled chicken, salad, corn  (40%)  Snack: 11pm-or 3am, peanuts butter and bread (feel shaky)        Past Medical/Surgical History:  Past Medical History   Diagnosis Date     Cataracts, both eyes 5/8/2013     Cervical cancer (H)      Diabetes      Diabetic retinopathy (H)      HTN      Inflammatory arthritis      Major depression      Memory loss      Nephropathy      OA (osteoarthritis)  of knee 9/11/2013     Other and unspecified hyperlipidemia      Pterygium eye      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     Past Surgical History   Procedure Laterality Date     Hysterectomy, pap no longer indicated       done in [place]-cervical cancer     Salpingo oophorectomy,r/l/jennifer       Salpingo Oophorectomy, RT/LT/JENNIFER     Colostomy  2000     Arthroscopy knee rt/lt       LT     Breast lumpectomy, rt/lt       LT-benign      C excis pterygium  10/08     [facility]     Hc colonoscopy thru stoma, diagnostic  2002     normal per report-no records available-records destroyed     C stomach surgery procedure unlisted       Knee surgery       Colostomy       Colonoscopy  5/10/2012     Procedure:COLONOSCOPY; screening colonoscopy; Surgeon:MILLA VEGA; Location:[facility] OR     Implant stimulator sacral nerve stage one Right 3/10/2015     Procedure: IMPLANT STIMULATOR SACRAL NERVE STAGE ONE;  Surgeon: Teodora Yusuf MD;  Location: [facility] OR     Implant stimulator sacral nerve stage two Right 3/31/2015     Procedure: IMPLANT STIMULATOR SACRAL NERVE STAGE TWO;  Surgeon: Teodora Yusuf MD;  Location: [facility] OR       Allergies:  Allergies   Allergen Reactions     Morphine Sulfate Itching       Current Medications   Current Outpatient Prescriptions   Medication     insulin aspart (NOVOLOG VIAL) 100 UNITS/ML injection     traMADol (ULTRAM) 50 MG tablet     gabapentin (NEURONTIN) 300 MG capsule     insulin NPH (NOVOLIN N VIAL) 100 UNIT/ML injection     PARoxetine (PAXIL) 20 MG tablet     simvastatin (ZOCOR) 20 MG tablet     wearable insulin delivery device (V-GO 40) kit     metFORMIN (GLUCOPHAGE) 1000 MG tablet     BusPIRone HCl 30 MG TABS     propranolol (INDERAL LA) 60 MG capsule     enalapril (VASOTEC) 2.5 MG tablet     ACCU-CHEK CELIA PLUS test strip     omeprazole (PRILOSEC) 40 MG capsule     aspirin 81 MG tablet     buPROPion (WELLBUTRIN XL) 150 MG 24 hr tablet     ABILIFY 15 MG tablet     blood glucose  "monitoring (ACCU-CHEK CELIA) test strip     insulin syringe-needle U-100 (BD INSULIN SYRINGE ULTRAFINE) 31G X 5/16\" 0.3 ML     insulin pen needle (B-D U/F) 31G X 5 MM     blood glucose monitoring (ACCU-CHEK FASTCLIX) lancets     ORDER FOR DME     insulin pen needle needle     Cholecalciferol (VITAMIN D) 1000 UNITS capsule     B Complex Vitamins (B-COMPLEX/B-12 PO)     UNIVERSAL REMOVER WIPES EX MISC     SKIN PREP WIPES MISC     MULTI-VITAMIN OR TABS     benzonatate (TESSALON) 200 MG capsule     insulin aspart (NOVOLOG FLEXPEN) 100 UNIT/ML injection     nystatin (MYCOSTATIN) 842855 UNIT/GM POWD     insulin aspart (NOVOLOG FLEXPEN) 100 UNIT/ML soln     Blood Glucose Calibration (ACCU-CHEK INSTANT CONTROL) LIQD     ORDER FOR DME     ibuprofen (ADVIL,MOTRIN) 200 MG tablet     No current facility-administered medications for this visit.        Family History:  Family History   Problem Relation Age of Onset     DIABETES Mother      CEREBROVASCULAR DISEASE Mother      DIABETES Father      Hypertension Father      CANCER Maternal Grandfather      CANCER Paternal Grandfather      DIABETES Brother      DIABETES Sister      Thyroid Disease Daughter      Thyroid Disease Sister      Glaucoma No family hx of      Macular Degeneration No family hx of     father, mother, her daughter DM2    Social History:  Social History   Substance Use Topics     Smoking status: Former Smoker     Quit date: 8/9/2016     Smokeless tobacco: Never Used     Alcohol use 0.0 oz/week     0 Standard drinks or equivalent per week      Comment: social occasions       ROS:  Full review of systems taken with the help of the intake sheet. Otherwise a complete 14 point review of systems was taken and is negative unless stated in the history above.      Physical Exam:   Blood pressure 118/66, pulse 77, height 1.575 m (5' 2\"), weight 88.1 kg (194 lb 3.2 oz).  General: well appearing, no acute distress, pleasant and conversant,   Mental Status/neuro: alert and " oriented  Face: symmetrical, normal facial color  Eyes: anicteric, PERRL  Neck: suppler, no lymphadenopahty  Thyroid: normal size and texture  Heart: regular rhythm, S1S2, no murmur appreciated  Lung: clear to auscultation bilaterally  Abdomen: soft, NT/ND  Legs: no swelling or edema  Feet: no deformities or ulcers, 2+ DP pulses, weak monofilament sensation both bilateral digits+      Labs (General):   Lab Results   Component Value Date     12/19/2016      Lab Results   Component Value Date    POTASSIUM 4.3 12/19/2016     Lab Results   Component Value Date    CHLORIDE 102 12/19/2016     Lab Results   Component Value Date    NICOLÁS 8.8 12/19/2016     Lab Results   Component Value Date    CO2 22 12/19/2016     Lab Results   Component Value Date    BUN 15 12/19/2016     Lab Results   Component Value Date    CR 0.88 12/19/2016     Lab Results   Component Value Date     12/19/2016     Lab Results   Component Value Date    TSH 1.24 12/19/2016     No results found for: T4  Lab Results   Component Value Date    A1C 10.9 12/19/2016       Glucose Log:   pre breakfast - 319-330  post breakfast - 294  pre lunch - 179  post lunch - 224-223-240  pre dinner - [phone]  post dinner - 291-184  bed time - [phone]        Assessment and Plan  61 year old female with poor controlled DM2, with neuropathy  # A1c has been 9-11 for 8 years, today 11, not consistently checking glucose, using fasting or post meal for sliding scale, repeating nocturnal hypo,    - Discotinue V-go for now and back to pen in order to adjust regimen first  - Lantus 30 units,     Novolog 5-8-8     Correction    250-300: 2 unit extra    >300: 4 units extra  - Appointment with Mikayla merida  - RTC with me in 2 month         I spent 60 minutes with this patient face to face and explained the conditions and plans (more than 50% of time was counseling/coordination of care) . The patient understood and is satisfied with today's visit.  Return to clinic with me in 2 months.         Elizabeth Medrano MD  Staff Physician  Endocrinology and Metabolism  License: [id number]

## 2017-02-28 ENCOUNTER — TRANSFERRED RECORDS (OUTPATIENT)
Dept: HEALTH INFORMATION MANAGEMENT | Facility: CLINIC | Age: 62
End: 2017-02-28

## 2017-02-28 LAB
PHQ9 SCORE: 14
PHQ9 SCORE: 14

## 2017-03-01 ENCOUNTER — TELEPHONE (OUTPATIENT)
Dept: ENDOCRINOLOGY | Facility: CLINIC | Age: 62
End: 2017-03-01

## 2017-03-01 NOTE — TELEPHONE ENCOUNTER
Pt seen in clinic last Friday as a new patient. A1c was 10.9. Has been using the V Go insulin pump x 1 year. A1c has increased since starting this therapy. Taken off V-Go pump at visit and transtitioned back to basal/bolus injection therapy: Lantus/Basaglar 30 units every night; Novolo units with B, 8 units with L, 8 units with D; sliding scale: add 2 units if premeal bg > 250, add 4 units if > 300. Blood sugars reported today. : D 132, Bed 246, : B 206, L 417, D 292, Bed 125, : D 270, Bed 156, : B 317, L 329. Pt advised to increase B dose Novolog to 6 units. Increase Lantus to 32 units. Will call pt in one week for bg report. Pt verbalized understanding.     Mikayla Sellers, RN, BSN, CDE   [facility]

## 2017-03-10 ENCOUNTER — TELEPHONE (OUTPATIENT)
Dept: FAMILY MEDICINE | Facility: CLINIC | Age: 62
End: 2017-03-10

## 2017-03-10 NOTE — TELEPHONE ENCOUNTER
Panel Management Review      Patient has the following on her problem list:     Depression / Dysthymia review  PHQ-9 SCORE 3/4/2016 5/18/2016 12/1/2016   Total Score - - -   Total Score 11 16 7      Patient is due for:  DAP    Diabetes    ASA: Passed    Last A1C  Lab Results   Component Value Date    A1C 10.9 12/19/2016    A1C 10.8 05/25/2016    A1C 10.6 03/21/2016    A1C 10.0 11/09/2015    A1C 9.8 08/19/2015     A1C tested: FAILED    Last LDL:    Lab Results   Component Value Date    CHOL 134 12/19/2016     Lab Results   Component Value Date    HDL 58 12/19/2016     Lab Results   Component Value Date    LDL 21 12/19/2016     Lab Results   Component Value Date    TRIG 274 12/19/2016     Lab Results   Component Value Date    CHOLHDLRATIO 2.0 09/18/2015     Lab Results   Component Value Date    NHDL 76 12/19/2016       Is the patient on a Statin? YES             Is the patient on Aspirin? YES    Medications     [facility] CoA Reductase Inhibitors    simvastatin (ZOCOR) 20 MG tablet    Salicylates    aspirin 81 MG tablet          Last three blood pressure readings:  BP Readings from Last 3 Encounters:   02/24/17 118/66   01/19/17 123/64   12/19/16 103/69       Date of last diabetes office visit: 2/24/17     Tobacco History:     History   Smoking Status     Former Smoker     Quit date: 8/9/2016   Smokeless Tobacco     Never Used         Hypertension   Last three blood pressure readings:  BP Readings from Last 3 Encounters:   02/24/17 118/66   01/19/17 123/64   12/19/16 103/69     Blood pressure: Passed    HTN Guidelines:  Age 18-59 BP range:  Less than 140/90  Age 60-85 with Diabetes:  Less than 140/90  Age 60-85 without Diabetes:  less than 150/90      Composite cancer screening  Chart review shows that this patient is due/due soon for the following Mammogram and Fecal Colorectal (FIT)  Summary:    Patient is due/failing the following:   FIT and MAMMOGRAM    Action needed:   Patient needs referral/order: mammo and  FIT    Type of outreach:    Sent letter.    Questions for provider review:    None                                                                                                                                    Dee Byrnes CMA       Chart routed to closed .

## 2017-03-10 NOTE — LETTER
[facility]  [address] [facility] [address] [phone]          March 10, 2017    Holly Muir  [address] [address]    Holly,      Our records indicate that you have not scheduled for a(n)mammogram and fit test which was recommended by your health care team.     [facility] now offers mammograms Thursdays, every other Monday, and Saturdays once a month.  If you are receiving any of these services at another site please bring in a copy at your next visit so we can get it scanned into your chart.     Monitoring and managing your preventative and chronic health conditions are very important to us.     If you have received your health care elsewhere, please call the clinic so the information can be documented in your chart.    Please call [phone] or message us through your [employer] account to schedule an appointment or provide information for your chart.     I look forward to seeing you and working with you on your health care needs.     Sincerely,       [facility]/rb              *If you have already scheduled an appointment, please disregard this reminder

## 2017-03-17 ENCOUNTER — TELEPHONE (OUTPATIENT)
Dept: ENDOCRINOLOGY | Facility: CLINIC | Age: 62
End: 2017-03-17

## 2017-03-17 PROCEDURE — G0179 MD RECERTIFICATION HHA PT: HCPCS | Performed by: INTERNAL MEDICINE

## 2017-03-17 NOTE — TELEPHONE ENCOUNTER
Spoke with pt. Unable to review bg readings at this time. States she has caught the flu and is unable to talk right now. I requested she call the clinic to discuss bg levels when she is feeling better. Pt verbalized understanding. I will put pt on my schedule to call mid-April as well.     Mikayla Sellers, RN, BSN, CDE   [facility]

## 2017-03-29 ENCOUNTER — TELEPHONE (OUTPATIENT)
Dept: ENDOCRINOLOGY | Facility: CLINIC | Age: 62
End: 2017-03-29

## 2017-03-29 NOTE — TELEPHONE ENCOUNTER
Lesvia, home health nurse, who visits pt every wed morning, calling to state pt's bg was 438 this morning, fasting, and is now 483. Pt did have a cup of coffee with milk and regular sugar earlier this morning. Just took 12 units novolog but has not yet eaten breakfast. Pt advised to eat breakfast now. Also states forgot to take lantus last night. Typically takes it around 9pm.  Bg readings are as follows: 03/29: 438/483, 03/28; B: 249, L: 242, 03/27: L 487, D 336, Bed 112, 03/26: Bed: 290.   Pt admits to missing lantus 1-2 times per week. States she forgets to take is some nights because she is tired. Pt advised to change the time to take lantus from bedtime to dinnertime. Take 16 units lantus now to cover missed dose last night. Return to taking prescribed PM dose this evening but increase lantus dose from 32 to 34 units. Pt and visiting RN verbalized understanding. Visiting RN states she will call next Wed to give us a bg update.     Mikayla Sellers, RN, BSN, CDE   [facility]

## 2017-04-19 ENCOUNTER — TELEPHONE (OUTPATIENT)
Dept: ENDOCRINOLOGY | Facility: CLINIC | Age: 62
End: 2017-04-19

## 2017-04-19 NOTE — TELEPHONE ENCOUNTER
Received a call from pt's home health nurse, Lesvia, who visits the patient every Wed morning. RN states pt's bg levels are still elevated. We spoke a couple of weeks a go. Of note, pt was on vacation last week so she was out of her normal routine. Fasting Bg this morning was 269. Continues to have higher readings during the day as well. RN thinks this may due to eating patterns. She has asked pt to provide her with a food log when she returns next week. Advised to incresae Lnatus from 34 to 36 units. Takes med daily at dinner time.    Mikayla Sellers RN, BSN, CDE   [facility]

## 2017-04-26 ENCOUNTER — DOCUMENTATION ONLY (OUTPATIENT)
Dept: CARE COORDINATION | Facility: CLINIC | Age: 62
End: 2017-04-26

## 2017-04-26 NOTE — PROGRESS NOTES
[facility] now requests orders and shares plan of care/discharge summaries for some patients through [facility].  Please REPLY TO THIS MESSAGE in order to give authorization for orders when needed.  This is considered a verbal order, you will still receive a faxed copy of orders for signature.  Thank you for your assistance in improving collaboration for our patients.    ORDER recertification orders for weekly medication set up and compliance    MD SUMMARY/PLAN OF CARE    Recert  Pt is a 62 yr old female continue care with [facility] for Disease Management/Education and assistance with Medications.  She currently resides in a single family home with her daughter/son in law and grandchildren. Family assist as needed, client takes medical transport to all appointments, she has been bringing after visit summary from appointments to nurse for medicaiton reconciliation and care coordination.   Primary medical diagnosis is Severe major depression with psychotic features, pt also suffers from memory loss and is Type 2 diabetic.  Pt has ostomy which she manages independently, pt also has implant  which has resulted in her remaining continent of urine. She reports chronic back pain which at worst she rates 7/10 and best 0/10 over the last 60 days since adding tramadol regularly, Currently pt pain is rated at a 5, she states it is a chronic aching feeling.  Teaching on proper pain mgmt with the use of Tramadol has been provided, pain is improved with consistent use of pain medications. She is alert and orientated x 3 with forgetfulness, speech is clear, she is pleasant and cooperative with in home nurse visits.  She has been taking medications as set up by nurse, with an occasional missed dose, this has improved significantly over the last 60 days.   Client has had one episode of hallucination related to missed medication doses when she was in [place] taking care of her ill mother and missed  3 days of medications in a row.  Client has a heightened awareness of hallucinations and cause of them.  She most recently had one episode of hallucination related to increased stress, this subsided, patient aware of reality.  Pt is following Psych doctor at [facility] and  [facility]. Pt is also seeing a therapist and pt states this has helped with  depression.   Education on ADA diet and carb counting, pt is to begin a food diary she has not been documenting her blood glucose readings because they are recorded in her blood glucose monitor.  Communication with endocrinology education regarding elevated blood sugars, client was advised to take her lantus with evening medications because she had missed lantus at , this has been easier for client to be achieve.    Pt will need ongoing education and assessment for compliance. Unable to check blood sugar today due to wrong strips being sent from pharmacy.   [facility]SS, [facility], no new concerns verbalized by patient  BACKGROUND//  Pt with     Severe major depression, memory loss and poorly controlled diabetes requires education/assistance with managing medication regimen.  Pt and family require education/assistance with ADA diet and skin integrity/assessments.  ANALYSIS//  High risk for     hospitalization r/t poorly controlled diabetes and history of noncompliance/forgetfulness with taking high risk medications correctly.  She will require skilled interventions to manage her health at home.    RECOMMENDATION//  Recommend SN visits for     Medication management/education, pt/CG education on ADA diet, skin integrity assessments/teaching.  Chronic pain management/education.  Expected length of home care is  ongoing HC need for jannette Orellana RN  [phone]  [email or web address]

## 2017-05-01 DIAGNOSIS — F41.9 ANXIETY: ICD-10-CM

## 2017-05-01 RX ORDER — BUSPIRONE HYDROCHLORIDE 30 MG/1
TABLET ORAL
Qty: 60 TABLET | Refills: 6 | Status: SHIPPED | OUTPATIENT
Start: 2017-05-01 | End: 2018-04-26

## 2017-05-05 RX ORDER — INSULIN GLARGINE 100 [IU]/ML
INJECTION, SOLUTION SUBCUTANEOUS
Qty: 30 ML | Refills: 0 | Status: SHIPPED | OUTPATIENT
Start: 2017-05-05 | End: 2017-05-10

## 2017-05-05 NOTE — TELEPHONE ENCOUNTER
insulin glargine (BASAGLAR KWIKPEN) 100 UNIT/ML injection     Last Written Prescription Date: 05/05/17  Last Fill Quantity: 30ml, # refills: 0  Last Office Visit with G, ROMARIO or [facility] prescribing provider:  12/19/16  Next 5 appointments (look out 90 days)     May 26, 2017  9:30 AM CDT   [facility] Visit with Elizabeth Medrano MD   [facility]    [address] [address] [place] [id number] [phone]                   BP Readings from Last 3 Encounters:   02/24/17 118/66   01/19/17 123/64   12/19/16 103/69     Lab Results   Component Value Date    MICROL 18 12/19/2016     Lab Results   Component Value Date    UMALCR 47.15 12/19/2016     Creatinine   Date Value Ref Range Status   12/19/2016 0.88 0.52 - 1.04 mg/dL Final   ]  GFR Estimate   Date Value Ref Range Status   12/19/2016 66 >60 mL/min/1.7m2 Final   05/25/2016 74 >60 mL/min/1.7m2 Final     Comment:     Non  GFR Calc   03/31/2015 72 >60 mL/min/1.7m2 Final     Comment:     Non  GFR Calc     GFR Estimate If Black   Date Value Ref Range Status   12/19/2016 79 >60 mL/min/1.7m2 Final   05/25/2016 89 >60 mL/min/1.7m2 Final     Comment:      GFR Calc   03/31/2015 87 >60 mL/min/1.7m2 Final     Comment:      GFR Calc     Lab Results   Component Value Date    CHOL 134 12/19/2016     Lab Results   Component Value Date    HDL 58 12/19/2016     Lab Results   Component Value Date    LDL 21 12/19/2016     Lab Results   Component Value Date    TRIG 274 12/19/2016     Lab Results   Component Value Date    CHOLHDLRATIO 2.0 09/18/2015     Lab Results   Component Value Date    AST 29 12/19/2016     Lab Results   Component Value Date    ALT 58 12/19/2016     Lab Results   Component Value Date    A1C 10.9 12/19/2016    A1C 10.8 05/25/2016    A1C 10.6 03/21/2016    A1C 10.0 11/09/2015    A1C 9.8 08/19/2015     Potassium   Date Value Ref Range Status   12/19/2016 4.3 3.4 -  5.3 mmol/L Final

## 2017-05-05 NOTE — TELEPHONE ENCOUNTER
Called [facility] back.  She states the patient is actually taking 36 U of the insulin glargine (BASAGLAR KWIKPEN).  The current order is     insulin glargine (BASAGLAR KWIKPEN) 100 UNIT/ML injection 30 mL 1 2/24/2017  --      Sig: Take 30 units every night at about the same time.        She ran out of medication early.  She is out of insuline today and needs a refill tonight.    Tried to call patient to discuss, left message on her cell to call clinic.    Routing to provider to please review refill request.    Liz Boogie RN, [facility]

## 2017-05-05 NOTE — TELEPHONE ENCOUNTER
Please let the home care nurse know that in the future, for her diabetes medications, she needs to contact patient's endocrinologist. I gave her a one time refill until she is seen by her endocrinologist in 3 weeks.

## 2017-05-05 NOTE — TELEPHONE ENCOUNTER
Called patient and informed.  She will contact endocrinology in the future for refills of diabetes medication and allow more time to complete refills.    Called homecare RN Rohit and informed her as well.    Liz Boogie RN, [facility]

## 2017-05-05 NOTE — TELEPHONE ENCOUNTER
Patient called back.  She states Mikayla Sellers RN in Endocrinology increased it on 4/19/17 from 34 U to  36 U.  Please see encounter on this date.    Liz Boogie RN, [facility]

## 2017-05-05 NOTE — TELEPHONE ENCOUNTER
"[facility] Call Center    Phone Message    Name of Caller: Rohit    Phone Number: Other phone number:  [phone]    Best time to return call: any    May a detailed message be left on voicemail: yes    Relation to patient: Home health nurse  Reason for Call: Other: Rohit is calling to speak with Dr. Mccurdy team TODAY. Rohit states the patient is out of her insulin glargine (BASAGLAR KWIKPEN) 100 UNIT/ML injection [[id number]] and she needs a refill TODAY. Informed roiht of 72 hour around time and Rohit \" That is not ok the patient is out TODAY and needs refill from Dr. King TODAY.\" Please advise     Action Taken: Message routed to:  Primary Care p [phone]    "

## 2017-05-09 ENCOUNTER — TRANSFERRED RECORDS (OUTPATIENT)
Dept: HEALTH INFORMATION MANAGEMENT | Facility: CLINIC | Age: 62
End: 2017-05-09

## 2017-05-10 ENCOUNTER — TELEPHONE (OUTPATIENT)
Dept: PEDIATRICS | Facility: CLINIC | Age: 62
End: 2017-05-10

## 2017-05-10 ENCOUNTER — TELEPHONE (OUTPATIENT)
Dept: ENDOCRINOLOGY | Facility: CLINIC | Age: 62
End: 2017-05-10

## 2017-05-10 NOTE — TELEPHONE ENCOUNTER
Patient's home health nurse, Lesvia, calling on behalf of patient, to report pt continues to have consistently elevated bg levels. No readings below 200. This morning bg was 269. States pt went to ED last night and Bg was > 600. Pt reported to ED staff she had been experiencing increased urination and increased thirst for several days. She was not told she wsa in ketoacidosis. Pt also reported to Ed staff, she had been out of Lantus for four days due to insurance issues. Per chart note, pcp's office to send Rx for lantus to pharmacy this morning. Pt given IV fluids and and 30 units lantus and sent home.     Pt's current insulin regimen is as follows: Lantus: 36 units q pm, Novolo units with breakfast, 8 units with lunch, 8 units with dinner, SS: add 2 units if pre-meal bg > 250, 4 units if > 300, max SS dose; u4 nits. Pt advsied to increase lantus to 38 units and change SS to the following: if pre-meal bg > 200, add 2 units, if > 250, add 4 units, if > 300 add 6 units, max SS dose: 6 units.       Pt given an appt to see me next Wed and has appt scheduled to see Dr Medrano on May 26th. Call clinic in the meantime if bg consistetly remains > 200. Pt verbalized understanding.    Mikayla Sellers, RN, BSN, CDE   [facility]

## 2017-05-10 NOTE — TELEPHONE ENCOUNTER
Received fax from [facility] pharmacy indicating that the recent Rx for insulin glargine (BASAGLAR KWIKPEN) 100 UNIT/ML injection is not covered by patient insurance. Requesting change of medication to Lantus Solostar ASAP as patient is out of medication.  Bere Patino, CMA

## 2017-05-15 PROCEDURE — G0179 MD RECERTIFICATION HHA PT: HCPCS | Performed by: INTERNAL MEDICINE

## 2017-05-26 ENCOUNTER — OFFICE VISIT (OUTPATIENT)
Dept: ENDOCRINOLOGY | Facility: CLINIC | Age: 62
End: 2017-05-26
Payer: MEDICARE

## 2017-05-26 VITALS
HEART RATE: 74 BPM | SYSTOLIC BLOOD PRESSURE: 103 MMHG | BODY MASS INDEX: 33.93 KG/M2 | WEIGHT: 184.4 LBS | HEIGHT: 62 IN | DIASTOLIC BLOOD PRESSURE: 57 MMHG

## 2017-05-26 DIAGNOSIS — E11.65 UNCONTROLLED TYPE 2 DIABETES MELLITUS WITH HYPERGLYCEMIA, WITH LONG-TERM CURRENT USE OF INSULIN (H): Primary | ICD-10-CM

## 2017-05-26 DIAGNOSIS — E88.819 INSULIN RESISTANCE: ICD-10-CM

## 2017-05-26 DIAGNOSIS — Z79.4 UNCONTROLLED TYPE 2 DIABETES MELLITUS WITH HYPERGLYCEMIA, WITH LONG-TERM CURRENT USE OF INSULIN (H): Primary | ICD-10-CM

## 2017-05-26 LAB — HBA1C MFR BLD: 12 % (ref 0–5.7)

## 2017-05-26 PROCEDURE — 99214 OFFICE O/P EST MOD 30 MIN: CPT | Performed by: INTERNAL MEDICINE

## 2017-05-26 PROCEDURE — 83036 HEMOGLOBIN GLYCOSYLATED A1C: CPT | Performed by: INTERNAL MEDICINE

## 2017-05-26 PROCEDURE — 36415 COLL VENOUS BLD VENIPUNCTURE: CPT | Performed by: INTERNAL MEDICINE

## 2017-05-26 RX ORDER — PIOGLITAZONEHYDROCHLORIDE 30 MG/1
30 TABLET ORAL DAILY
Qty: 90 TABLET | Refills: 3 | Status: SHIPPED | OUTPATIENT
Start: 2017-05-26 | End: 2018-08-17

## 2017-05-26 NOTE — NURSING NOTE
"Holly Muir's goals for this visit include: Follow up Diabetes  She requests these members of her care team be copied on today's visit information: YES    PCP: Tiffany King    Referring Provider:  No referring provider defined for this encounter.    Chief Complaint   Patient presents with     Diabetes       Initial Wt 83.6 kg (184 lb 6.4 oz)  BMI 33.73 kg/m2 Estimated body mass index is 33.73 kg/(m^2) as calculated from the following:    Height as of 2/24/17: 1.575 m (5' 2\").    Weight as of this encounter: 83.6 kg (184 lb 6.4 oz).  Medication Reconciliation: complete    Do you need any medication refills at today's visit? NO    "

## 2017-05-26 NOTE — PROGRESS NOTES
- Endocrinology Follow up -    Reason for visit/consult:  Uncontrolled type 2 diabetes mellitus with hyperglycemia, with long-term current use of insulin (H)    Primary care provider: Tiffany King    HPI: 61 yo female second follow up DM2, she has remote history of colectomy. This is the second visit. She was using V go for several month, which was not convenient for her and confused the system, worsened A1C for her case, therefore we switch back to insulin injection regimens.    According to her, she is injecting insulin, currently on lantus 38 units bedtime and Novolog  6-8-8 and sliding scale. She is also on metformin.   Her morning glucose is consistently high (296, 336, 391, 255), by serial questions, she admitted she is usually gets hungry at night and eats.     - Continue current Lantus 38 utnis  - Continue current Novolog     Novolog 6-8-8     Novolog sliding scale               200-250: +2unit               250-300: +4 unit               300-350: +4               >350    :+6 unit  -Continue current metformin 1000 mg bid          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     Past Surgical History:   Procedure Laterality Date     ARTHROSCOPY KNEE RT/LT      LT     BREAST LUMPECTOMY, RT/LT      LT-benign      C EXCIS PTERYGIUM  10/08    [facility]     C STOMACH SURGERY PROCEDURE UNLISTED       COLONOSCOPY  5/10/2012    Procedure:COLONOSCOPY; screening colonoscopy; Surgeon:MILLA VEGA; Location:[facility] OR     COLOSTOMY  2000     COLOSTOMY       HC COLONOSCOPY THRU STOMA, DIAGNOSTIC  2002     normal per report-no records available-records destroyed     HYSTERECTOMY, PAP NO LONGER INDICATED      done in [place]-cervical cancer     IMPLANT STIMULATOR SACRAL NERVE STAGE ONE Right 3/10/2015    Procedure: IMPLANT STIMULATOR SACRAL NERVE STAGE ONE;  Surgeon: Teodora Yusuf MD;  Location: [facility] OR     IMPLANT STIMULATOR SACRAL NERVE STAGE TWO Right 3/31/2015    Procedure: IMPLANT STIMULATOR SACRAL NERVE STAGE TWO;  Surgeon: Teodora Yusuf MD;  Location: [facility] OR     KNEE SURGERY       SALPINGO OOPHORECTOMY,R/L/JENNIFER      Salpingo Oophorectomy, RT/LT/JENNIFER       Allergies:  Allergies   Allergen Reactions     Morphine Sulfate Itching       Current Medications   Current Outpatient Prescriptions   Medication     insulin glargine (LANTUS SOLOSTAR) 100 UNIT/ML injection     gabapentin (NEURONTIN) 300 MG capsule     BusPIRone HCl 30 MG TABS     insulin aspart (NOVOLOG FLEXPEN) 100 UNIT/ML injection     blood glucose monitoring (ACCU-CHEK FASTCLIX) lancets     traMADol (ULTRAM) 50 MG tablet     PARoxetine (PAXIL) 20 MG tablet     simvastatin (ZOCOR) 20 MG tablet     metFORMIN (GLUCOPHAGE) 1000 MG tablet     propranolol (INDERAL LA) 60 MG capsule     enalapril (VASOTEC) 2.5 MG tablet     omeprazole (PRILOSEC) 40 MG capsule     aspirin 81 MG tablet     nystatin (MYCOSTATIN) 782553 UNIT/GM POWD     buPROPion (WELLBUTRIN XL) 150 MG 24 hr tablet     ABILIFY 15 MG tablet     blood glucose monitoring (ACCU-CHEK CELIA) test strip     insulin pen needle needle     Cholecalciferol (VITAMIN D) 1000 UNITS capsule     B Complex Vitamins (B-COMPLEX/B-12 PO)     ibuprofen (ADVIL,MOTRIN) 200 MG tablet     SKIN PREP WIPES MISC     MULTI-VITAMIN OR TABS     [DISCONTINUED] insulin aspart (NOVOLOG VIAL) 100 UNITS/ML injection     [DISCONTINUED] insulin aspart (NOVOLOG FLEXPEN) 100 UNIT/ML injection     ORDER FOR DME     ORDER FOR DME     UNIVERSAL REMOVER WIPES EX MISC     No current facility-administered medications for this visit.   "      Family History:  Family History   Problem Relation Age of Onset     DIABETES Mother      CEREBROVASCULAR DISEASE Mother      DIABETES Father      Hypertension Father      CANCER Maternal Grandfather      CANCER Paternal Grandfather      DIABETES Brother      DIABETES Sister      Thyroid Disease Daughter      Thyroid Disease Sister      Glaucoma No family hx of      Macular Degeneration No family hx of        Social History:  Social History   Substance Use Topics     Smoking status: Former Smoker     Quit date: 8/9/2016     Smokeless tobacco: Never Used     Alcohol use 0.0 oz/week     0 Standard drinks or equivalent per week      Comment: social occasions       ROS:  Full review of systems taken with the help of the intake sheet. Otherwise a complete 14 point review of systems was taken and is negative unless stated in the history above.    Physical Exam:   Blood pressure 103/57, pulse 74, height 1.575 m (5' 2\"), weight 83.6 kg (184 lb 6.4 oz).  General: well appearing, no acute distress, pleasant and conversant,   Mental Status/neuro: alert and oriented  Face: symmetrical, normal facial color  Eyes: anicteric, PERRL, no proptosis or lid lag  Neck: suppler, no lymphadenopahty  Thyroid: normal size and texture, no nodule palpable, no bruits  Heart: regular rhythm, S1S2, no murmur appreciated  Lung: clear to auscultation bilaterally  Abdomen: soft, NT/ND, no hepatomegaly  Legs: no swelling or edema  Feet: no deformities or ulcers, 2+ DP pulses, first and second digits mildly decreased sensation      Labs (General):   Lab Results   Component Value Date     12/19/2016      Lab Results   Component Value Date    POTASSIUM 4.3 12/19/2016     Lab Results   Component Value Date    CHLORIDE 102 12/19/2016     Lab Results   Component Value Date    NICOLÁS 8.8 12/19/2016     Lab Results   Component Value Date    CO2 22 12/19/2016     Lab Results   Component Value Date    BUN 15 12/19/2016     Lab Results   Component " Value Date    CR 0.88 12/19/2016     Lab Results   Component Value Date     12/19/2016     Lab Results   Component Value Date    TSH 1.24 12/19/2016     No results found for: T4  Lab Results   Component Value Date    A1C 12.0 05/26/2017       Glucose Log:   pre breakfast - 296, 457, 273, 336, 448, 391, 255, 275, 232  pre lunch - 200, 194, 165, 230, 253, 161, 142, 282, 322, 309  pre dinner - 193, 139, 57, 187, 223, 317, 182, 237, 189, 335  bed time - 145, 313, 294, 185, 183, 189, 136, 178      Assessment and Plan  62 year old female with DM2, coloectomy,  # Insulin resistance, still A1C 12, eating at night, compliance questionalbe    - Discussed about compliance  - Continue current lantus 38 units bedtime  - Continue current Novolog     Novolog 6-8-8     Novolog sliding scale               200-250: +2unit               250-300: +4 unit               300-350: +4               >350    :+6 unit  -Continue current metformin 1000 mg bid  -Add on Actos 30 mg daily  -Follow up with Mikayla in 1 month  -RTC with me in 3 months    I spent 30 minutes with this patient face to face and explained the conditions and plans (more than 50% of time was counseling/coordination of care, compliance, life style) . The patient understood and is satisfied with today's visit. Return to clinic with me in 3 months.         Elizabeth Medrano MD  Staff Physician  Endocrinology and Metabolism  License: [id number]

## 2017-05-26 NOTE — PATIENT INSTRUCTIONS
Please bring blood sugar log with you to appt with LEORA Saldana.      Sending blood sugars to your provider at [facility]:  We want to help you with your diabetes management, which often requires frequent adjustments to your therapy. For your convenience, we have several ways to send your blood sugars to your doctor for review.    - Send message directly to your doctor through My Chart.  Please ask the rooming staff if you would like to sign up for My Chart.  This is a fast and confidential way to send your information and communicate directly with your provider.   - Record readings and fax to [phone].  We have a template for you to use for your convenience.  - If you have a [employer] pump, upload to [email or web address] and notify your provider of your username and password.   - Stop by the clinic with your meter for download.   - My Chart or call Mikayla Sellers Diabetes Educator at [phone]  - Call the clinic and speak to one of the endocrine nurses to relay information on the telephone.  Argelia Higginbotham, or [facility] at [phone].   -    Please call the on-call Endocrinologist at the [facility] for after       hours/weekend needs at [phone] Option #4.    Please note that you do not need to FAST if you are just having an A1C drawn. Please remember to ALWAYS bring your glucose meter with to your appointment. This data is very important for the management of your care.    Thank you!  Your [facility] Care Team

## 2017-05-26 NOTE — LETTER
5/26/2017      RE: Holly Muir  [address] [facility] [address]     Dear Colleague,    Thank you for referring your patient, Holly Muir, to the [facility]. Please see a copy of my visit note below.                                                      - Endocrinology Follow up -    Reason for visit/consult:  Uncontrolled type 2 diabetes mellitus with hyperglycemia, with long-term current use of insulin (H)    Primary care provider: Tiffany King    HPI: 61 yo female second follow up DM2, she has remote history of colectomy. This is the second visit. She was using V go for several month, which was not convenient for her and confused the system, worsened A1C for her case, therefore we switch back to insulin injection regimens.    According to her, she is injecting insulin, currently on lantus 38 units bedtime and Novolog  6-8-8 and sliding scale. She is also on metformin.   Her morning glucose is consistently high (296, 336, 391, 255), by serial questions, she admitted she is usually gets hungry at night and eats.     - Continue current Lantus 38 utnis  - Continue current Novolog     Novolog 6-8-8     Novolog sliding scale               200-250: +2unit               250-300: +4 unit               300-350: +4               >350    :+6 unit  -Continue current metformin 1000 mg bid          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     Past Surgical History:   Procedure Laterality Date     ARTHROSCOPY KNEE RT/LT      LT     BREAST LUMPECTOMY, RT/LT      LT-benign      C EXCIS PTERYGIUM  10/08    [facility]      C STOMACH SURGERY PROCEDURE UNLISTED       COLONOSCOPY  5/10/2012    Procedure:COLONOSCOPY; screening colonoscopy; Surgeon:MILLA VEGA; Location:[facility] OR     COLOSTOMY  2000     COLOSTOMY       HC COLONOSCOPY THRU STOMA, DIAGNOSTIC  2002    normal per report-no records available-records destroyed     HYSTERECTOMY, PAP NO LONGER INDICATED      done in [place]-cervical cancer     IMPLANT STIMULATOR SACRAL NERVE STAGE ONE Right 3/10/2015    Procedure: IMPLANT STIMULATOR SACRAL NERVE STAGE ONE;  Surgeon: Teodora Yusuf MD;  Location: [facility] OR     IMPLANT STIMULATOR SACRAL NERVE STAGE TWO Right 3/31/2015    Procedure: IMPLANT STIMULATOR SACRAL NERVE STAGE TWO;  Surgeon: Teodora Yusuf MD;  Location: [facility] OR     KNEE SURGERY       SALPINGO OOPHORECTOMY,R/L/JENNIFER      Salpingo Oophorectomy, RT/LT/JENNIFER       Allergies:  Allergies   Allergen Reactions     Morphine Sulfate Itching       Current Medications   Current Outpatient Prescriptions   Medication     insulin glargine (LANTUS SOLOSTAR) 100 UNIT/ML injection     gabapentin (NEURONTIN) 300 MG capsule     BusPIRone HCl 30 MG TABS     insulin aspart (NOVOLOG FLEXPEN) 100 UNIT/ML injection     blood glucose monitoring (ACCU-CHEK FASTCLIX) lancets     traMADol (ULTRAM) 50 MG tablet     PARoxetine (PAXIL) 20 MG tablet     simvastatin (ZOCOR) 20 MG tablet     metFORMIN (GLUCOPHAGE) 1000 MG tablet     propranolol (INDERAL LA) 60 MG capsule     enalapril (VASOTEC) 2.5 MG tablet     omeprazole (PRILOSEC) 40 MG capsule     aspirin 81 MG tablet     nystatin (MYCOSTATIN) 246743 UNIT/GM POWD     buPROPion (WELLBUTRIN XL) 150 MG 24 hr tablet     ABILIFY 15 MG tablet     blood glucose monitoring (ACCU-CHEK CELIA) test strip     insulin pen needle needle     Cholecalciferol (VITAMIN D) 1000 UNITS capsule     B Complex Vitamins (B-COMPLEX/B-12 PO)     ibuprofen (ADVIL,MOTRIN) 200 MG tablet     SKIN PREP WIPES MISC     MULTI-VITAMIN OR TABS     [DISCONTINUED] insulin aspart  "(NOVOLOG VIAL) 100 UNITS/ML injection     [DISCONTINUED] insulin aspart (NOVOLOG FLEXPEN) 100 UNIT/ML injection     ORDER FOR DME     ORDER FOR DME     UNIVERSAL REMOVER WIPES EX MISC     No current facility-administered medications for this visit.        Family History:  Family History   Problem Relation Age of Onset     DIABETES Mother      CEREBROVASCULAR DISEASE Mother      DIABETES Father      Hypertension Father      CANCER Maternal Grandfather      CANCER Paternal Grandfather      DIABETES Brother      DIABETES Sister      Thyroid Disease Daughter      Thyroid Disease Sister      Glaucoma No family hx of      Macular Degeneration No family hx of        Social History:  Social History   Substance Use Topics     Smoking status: Former Smoker     Quit date: 8/9/2016     Smokeless tobacco: Never Used     Alcohol use 0.0 oz/week     0 Standard drinks or equivalent per week      Comment: social occasions       ROS:  Full review of systems taken with the help of the intake sheet. Otherwise a complete 14 point review of systems was taken and is negative unless stated in the history above.    Physical Exam:   Blood pressure 103/57, pulse 74, height 1.575 m (5' 2\"), weight 83.6 kg (184 lb 6.4 oz).  General: well appearing, no acute distress, pleasant and conversant,   Mental Status/neuro: alert and oriented  Face: symmetrical, normal facial color  Eyes: anicteric, PERRL, no proptosis or lid lag  Neck: suppler, no lymphadenopahty  Thyroid: normal size and texture, no nodule palpable, no bruits  Heart: regular rhythm, S1S2, no murmur appreciated  Lung: clear to auscultation bilaterally  Abdomen: soft, NT/ND, no hepatomegaly  Legs: no swelling or edema  Feet: no deformities or ulcers, 2+ DP pulses, first and second digits mildly decreased sensation      Labs (General):   Lab Results   Component Value Date     12/19/2016      Lab Results   Component Value Date    POTASSIUM 4.3 12/19/2016     Lab Results   Component " Value Date    CHLORIDE 102 12/19/2016     Lab Results   Component Value Date    NICOLÁS 8.8 12/19/2016     Lab Results   Component Value Date    CO2 22 12/19/2016     Lab Results   Component Value Date    BUN 15 12/19/2016     Lab Results   Component Value Date    CR 0.88 12/19/2016     Lab Results   Component Value Date     12/19/2016     Lab Results   Component Value Date    TSH 1.24 12/19/2016     No results found for: T4  Lab Results   Component Value Date    A1C 12.0 05/26/2017       Glucose Log:   pre breakfast - 296, 457, 273, 336, 448, 391, 255, 275, 232  pre lunch - 200, 194, 165, 230, 253, 161, 142, 282, 322, 309  pre dinner - 193, 139, 57, 187, 223, 317, 182, 237, 189, 335  bed time - 145, 313, 294, 185, 183, 189, 136, 178      Assessment and Plan  62 year old female with DM2, coloectomy,  # Insulin resistance, still A1C 12, eating at night, compliance questionalbe    - Discussed about compliance  - Continue current lantus 38 units bedtime  - Continue current Novolog     Novolog 6-8-8     Novolog sliding scale               200-250: +2unit               250-300: +4 unit               300-350: +4               >350    :+6 unit  -Continue current metformin 1000 mg bid  -Add on Actos 30 mg daily  -Follow up with Mikayla in 1 month  -RTC with me in 3 months    I spent 30 minutes with this patient face to face and explained the conditions and plans (more than 50% of time was counseling/coordination of care, compliance, life style) . The patient understood and is satisfied with today's visit. Return to clinic with me in 3 months.         Elizabeth Medrano MD  Staff Physician  Endocrinology and Metabolism  License: [id number]    Again, thank you for allowing me to participate in the care of your patient.      Sincerely,    Elizabeth Medrano MD

## 2017-05-26 NOTE — MR AVS SNAPSHOT
After Visit Summary   5/26/2017    Holly Muir    MRN: [id number]           Patient Information     Date Of Birth          1955        Visit Information        Provider Department      5/26/2017 9:30 AM Elizabeth Medrano MD [facility]        Today's Diagnoses     Uncontrolled type 2 diabetes mellitus with hyperglycemia, with long-term current use of insulin (H)    -  1      Care Instructions    Please bring blood sugar log with you to appt with LEORA Saldana.      Sending blood sugars to your provider at [place]:  We want to help you with your diabetes management, which often requires frequent adjustments to your therapy. For your convenience, we have several ways to send your blood sugars to your doctor for review.    - Send message directly to your doctor through My Chart.  Please ask the rooming staff if you would like to sign up for My Chart.  This is a fast and confidential way to send your information and communicate directly with your provider.   - Record readings and fax to [phone].  We have a template for you to use for your convenience.  - If you have a Medtronic pump, upload to [employer] and notify your provider of your username and password.   - Stop by the clinic with your meter for download.   - My Chart or call Mikayla Sellers Diabetes Educator at [phone]  - Call the clinic and speak to one of the endocrine nurses to relay information on the telephone.  Argelia Higginbotham, or [facility] at [phone].   -    Please call the on-call Endocrinologist at the [facility] for after       hours/weekend needs at [phone] Option #4.    Please note that you do not need to FAST if you are just having an A1C drawn. Please remember to ALWAYS bring your glucose meter with to your appointment. This data is very important for the management of your care.    Thank you!  Your [facility] Team                Follow-ups after your visit        Your next 10  appointments already scheduled     Jun 27, 2017  1:00 PM CDT   New Visit with [facility] Cde Provider   [facility]    [id number] [place] [address] [id number] [phone]            Sep 01, 2017  9:45 AM CDT   Return Visit with Elizabeth Medrano MD, [facility] END[facility] NURSE   [facility] ([facility])    [address] [place] [address] [id number] [phone]              Future tests that were ordered for you today     Open Standing Orders        Priority Remaining Interval Expires Ordered    Hemoglobin A1c POCT Routine 3/4  5/26/2018 5/26/2017            Who to contact     If you have questions or need follow up information about today's clinic visit or your schedule please contact [facility] directly at [phone].  Normal or non-critical lab and imaging results will be communicated to you by MyChart, letter or phone within 4 business days after the clinic has received the results. If you do not hear from us within 7 days, please contact the clinic through [email or web address] or phone. If you have a critical or abnormal lab result, we will notify you by phone as soon as possible.  Submit refill requests through [place] [facility] or call your pharmacy and they will forward the refill request to us. Please allow 3 business days for your refill to be completed.          Additional Information About Your Visit        [email or web address] Information     [place] [facility] is an electronic gateway that provides easy, online access to your medical records. With [facility] [facility], you can request a clinic appointment, read your test results, renew a prescription or communicate with your care team.     To sign up for [place] [facility] visit the website at [email or web address]   You will be asked to enter the access code listed below, as well as some personal information. Please follow the directions to create your username and password.     Your access code is: [email or web address]  Expires:  "2017 10:14 AM     Your access code will  in 90 days. If you need help or a new code, please contact your [facility] or call [phone] for assistance.        Care EveryWhere ID     This is your Care EveryWhere ID. This could be used by other organizations to access your [facility] medical records  [id number]        Your Vitals Were     Pulse Height BMI (Body Mass Index)             74 1.575 m (5' 2\") 33.73 kg/m2          Blood Pressure from Last 3 Encounters:   17 103/57   17 118/66   17 123/64    Weight from Last 3 Encounters:   17 83.6 kg (184 lb 6.4 oz)   17 88.1 kg (194 lb 3.2 oz)   17 87.1 kg (192 lb)              We Performed the Following     Hemoglobin A1c POCT          Today's Medication Changes          These changes are accurate as of: 17 10:14 AM.  If you have any questions, ask your nurse or doctor.               These medicines have changed or have updated prescriptions.        Dose/Directions    blood glucose monitoring test strip   Commonly known as:  ACCU-CHEK CELIA   This may have changed:  Another medication with the same name was removed. Continue taking this medication, and follow the directions you see here.   Used for:  Type 2 diabetes mellitus, uncontrolled (H)        Use to test blood sugars 8 times daily or as directed.   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   Quantity:  90 capsule   Refills:  3                Primary Care Provider Office Phone # Fax #    Tiffany King [phone]       [facility] [address]  [facility] [address]        Goals        General    I will continue to monitor my blood sugars and treat the results as directed. (pt-stated)     Notes - Note created  12/9/2015 10:26 AM by Randy Suarez RN    As of today's date 12/9/2015 goal is met at 0 - 25%.   Goal Status:  Active          Thank you!     Thank you for choosing [facility]  for your care. Our goal is always to provide you with excellent care. Hearing back from our patients is one way we can continue to improve our services. Please take a few minutes to complete the written survey that you may receive in the mail after your visit with us. Thank you!             Your Updated Medication List - Protect others around you: Learn how to safely use, store and throw away your medicines at www.disposemymeds.org.          This list is accurate as of: 5/26/17 10:14 AM.  Always use your most recent med list.                   Brand Name Dispense Instructions for use    ABILIFY 15 MG tablet   Generic drug:  ARIPiprazole     90 tablet    TAKE 1 TABLET BY MOUTH AT BEDTIME       aspirin 81 MG tablet     90 tablet    Take 1 tablet (81 mg) by mouth daily       B-COMPLEX/B-12 PO      Take 1 tablet by mouth daily.       blood glucose monitoring lancets     9 Box    Use to test blood sugar 8 times daily or as directed.       blood glucose monitoring test strip    ACCU-CHEK CELIA    250 each    Use to test blood sugars 8 times daily or as directed.       buPROPion 150 MG 24 hr tablet    WELLBUTRIN XL    270 tablet    TAKE 3 TABLETS (450 MG) BY MOUTH EVERY MORNING       BusPIRone HCl 30 MG Tabs     60 tablet    TAKE 1 TABLET BY MOUTH TWICE A DAY       enalapril 2.5 MG tablet    VASOTEC     90 tablet    TAKE 1 TABLET BY MOUTH EVERY DAY       gabapentin 300 MG capsule    NEURONTIN    90 capsule    TAKE 1 CAPSULE (300 MG) BY MOUTH 3 TIMES DAILY       ibuprofen 200 MG tablet    ADVIL/MOTRIN     Take 3-4 tablets by mouth every 4 hours as needed Reported on 2/24/2017       insulin aspart 100 UNIT/ML injection    NovoLOG FLEXPEN    30 mL    Take as directed with meals. Total daily dose approx 30 units.       insulin glargine 100 UNIT/ML injection    LANTUS SOLOSTAR    9 mL    Inject 30 Units Subcutaneous At Bedtime       insulin pen needle 31G X 5 MM     3 Box    3 Box See Admin Instructions Use four times a day times a day       metFORMIN 1000 MG tablet    GLUCOPHAGE    60 tablet    TAKE 1 TABLET BY MOUTH 2 TIMES DAILY WITH MEALS       Multi-vitamin Tabs tablet   Generic drug:  multivitamin, therapeutic with minerals      1 TABLET DAILY       nystatin 745970 UNIT/GM Powd    MYCOSTATIN    60 g    Apply 1 dose topically 3 times daily as needed       omeprazole 40 MG capsule    priLOSEC    90 capsule    Take 1 capsule (40 mg) by mouth daily Take 30-60 minutes before a meal.       order for DME     1 Units    Equipment being ordered: Cane       order for DME     3 Month    Equipment being ordered:  Ostomy supplies.  Drain Pouch by Cia[employer] #.    Also needs Barrier by Valarie #.       PARoxetine 20 MG tablet    PAXIL    270 tablet    TAKE 3 TABLETS AT BEDTIME       propranolol 60 MG 24 hr capsule    INDERAL LA    60 capsule    Take 1 capsule (60 mg) by mouth daily May take a second dose if tremor recurs.       simvastatin 20 MG tablet    ZOCOR    90 tablet    TAKE 1 TABLET (20 MG) BY MOUTH AT BEDTIME       SKIN PREP WIPES Misc     1 Bottle    to be used with colostomy care       traMADol 50 MG tablet    ULTRAM    60 tablet    Take 1-2 tablets ( mg) by mouth daily as needed for moderate pain       Universal Remover Wipes Misc     1 Each    uses to remove barrier from colostomy bag at time of  change       vitamin D 1000 UNITS capsule     90 capsule    Take 2,000 to 4,000 Units per day

## 2017-06-20 ENCOUNTER — TRANSFERRED RECORDS (OUTPATIENT)
Dept: HEALTH INFORMATION MANAGEMENT | Facility: CLINIC | Age: 62
End: 2017-06-20

## 2017-06-20 LAB — PHQ9 SCORE: 10

## 2017-06-27 ENCOUNTER — OFFICE VISIT (OUTPATIENT)
Dept: NURSING | Facility: CLINIC | Age: 62
End: 2017-06-27
Payer: MEDICARE

## 2017-06-27 DIAGNOSIS — E11.9 DIABETES MELLITUS WITHOUT COMPLICATION (H): Primary | ICD-10-CM

## 2017-06-27 PROCEDURE — G0108 DIAB MANAGE TRN  PER INDIV: HCPCS

## 2017-06-27 NOTE — PATIENT INSTRUCTIONS
1. Take 2 units Novolog when eating a peanut butter and jelly sandwich as a snack. This can be during the day or in the middle of the night.   2. Try to make sure there are about 9 hours between taking your morning pills and evening pills. For example:  11am: Breakfast and morning pills. Wait to take evening pills until around 8pm.  or  1pm: Lunch and morning pills. Wait to take evening pills until around 10pm.     Mikayla Sellers, RN, BSN, CDE   [facility]

## 2017-06-27 NOTE — MR AVS SNAPSHOT
After Visit Summary   6/27/2017    Holly Muir    MRN: [id number]           Patient Information     Date Of Birth          1955        Visit Information        Provider Department      6/27/2017 1:00 PM Provider, Mg Mendez [facility]        Care Instructions    1. Take 2 units Novolog when eating a peanut butter and jelly sandwich as a snack. This can be during the day or in the middle of the night.   2. Try to make sure there are about 9 hours between taking your morning pills and evening pills. For example:  11am: Breakfast and morning pills. Wait to take evening pills until around 8pm.  or  1pm: Lunch and morning pills. Wait to take evening pills until around 10pm.     Mikayla Sellers, RN, BSN, CDE   [facility]          Follow-ups after your visit        Your next 10 appointments already scheduled     Jul 10, 2017  3:00 PM CDT   Telephone Visit with Mg Mendez Provider   [facility])    [address] [place] [id number] [phone]           Note: this is not an onsite visit; there is no need to come to the facility.            Sep 01, 2017  9:45 AM CDT   Return Visit with Elizabeth Medrano MD,  ENDO NURSE   [facility])    [address] [place] [id number] [phone]              Who to contact     If you have questions or need follow up information about today's clinic visit or your schedule please contact [facility] directly at [phone].  Normal or non-critical lab and imaging results will be communicated to you by MyChart, letter or phone within 4 business days after the clinic has received the results. If you do not hear from us within 7 days, please contact the clinic through MyChart or phone. If you have a critical or abnormal lab result, we will notify you by phone as soon as possible.  Submit refill  requests through [employer] or call your pharmacy and they will forward the refill request to us. Please allow 3 business days for your refill to be completed.          Additional Information About Your Visit        [employer] Information     [employer] is an electronic gateway that provides easy, online access to your medical records. With [employer], you can request a clinic appointment, read your test results, renew a prescription or communicate with your care team.     To sign up for [employer] visit the website at [email or web address]   You will be asked to enter the access code listed below, as well as some personal information. Please follow the directions to create your username and password.     Your access code is: [email or web address]  Expires: 2017 10:14 AM     Your access code will  in 90 days. If you need help or a new code, please contact your [facility] or call [phone] for assistance.        Care EveryWhere ID     This is your Care EveryWhere ID. This could be used by other organizations to access your [place] medical records  [id number]         Blood Pressure from Last 3 Encounters:   17 103/57   17 118/66   17 123/64    Weight from Last 3 Encounters:   17 83.6 kg (184 lb 6.4 oz)   17 88.1 kg (194 lb 3.2 oz)   17 87.1 kg (192 lb)              Today, you had the following     No orders found for display         Today's Medication Changes          These changes are accurate as of: 17  1:36 PM.  If you have any questions, ask your nurse or doctor.               These medicines have changed or have updated prescriptions.        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   Quantity:  90 capsule   Refills:  3                Primary Care Provider Office Phone # Fax #    Tiffany King [phone]       [employer] [address] [facility] [address]        Goals        General    I will continue to monitor my blood sugars and treat the results as directed. (pt-stated)     Notes - Note created  12/9/2015 10:26 AM by Randy Suarez RN    As of today's date 12/9/2015 goal is met at 0 - 25%.   Goal Status:  Active          Equal Access to Services     [facility]: Hadii dharmesh astudillo hadasho Soomaali, waaxda luqadaha, qaybta kaalmada adeegyada, kim iqbal . So [facility] [phone].    ATENCIÓN: Si habla español, tiene a lopez disposición servicios gratuitos de asistencia lingüística. Llame al [phone].    We comply with applicable federal civil rights laws and [place] laws. We do not discriminate on the basis of race, color, national origin, age, disability sex, sexual orientation or gender identity.            Thank you!     Thank you for choosing [facility]  for your care. Our goal is always to provide you with excellent care. Hearing back from our patients is one way we can continue to improve our services. Please take a few minutes to complete the written survey that you may receive in the mail after your visit with us. Thank you!             Your Updated Medication List - Protect others around you: Learn how to safely use, store and throw away your medicines at www.disposemymeds.org.          This list is accurate as of: 6/27/17  1:36 PM.  Always use your most recent med  list.                   Brand Name Dispense Instructions for use Diagnosis    ABILIFY 15 MG tablet   Generic drug:  ARIPiprazole     90 tablet    TAKE 1 TABLET BY MOUTH AT BEDTIME    Visual hallucination, Severe recurrent major depressive disorder with psychotic features (H)       aspirin 81 MG tablet     90 tablet    Take 1 tablet (81 mg) by mouth daily    Uncontrolled diabetes mellitus (H)       B-COMPLEX/B-12 PO      Take 1 tablet by mouth daily.        blood glucose monitoring lancets     9 Box    Use to test blood sugar 8 times daily or as directed.    Type 2 diabetes mellitus, uncontrolled (H)       blood glucose monitoring test strip    ACCU-CHEK CELIA    250 each    Use to test blood sugars 8 times daily or as directed.    Type 2 diabetes mellitus, uncontrolled (H)       buPROPion 150 MG 24 hr tablet    WELLBUTRIN XL    270 tablet    TAKE 3 TABLETS (450 MG) BY MOUTH EVERY MORNING    Visual hallucination, Severe recurrent major depressive disorder with psychotic features (H)       BusPIRone HCl 30 MG Tabs     60 tablet    TAKE 1 TABLET BY MOUTH TWICE A DAY    Anxiety       enalapril 2.5 MG tablet    VASOTEC    90 tablet    TAKE 1 TABLET BY MOUTH EVERY DAY    Diabetes mellitus with background retinopathy (H)       gabapentin 300 MG capsule    NEURONTIN    90 capsule    TAKE 1 CAPSULE (300 MG) BY MOUTH 3 TIMES DAILY    Diabetic polyneuropathy associated with type 2 diabetes mellitus (H)       ibuprofen 200 MG tablet    ADVIL/MOTRIN     Take 3-4 tablets by mouth every 4 hours as needed Reported on 2/24/2017        insulin aspart 100 UNIT/ML injection    NovoLOG FLEXPEN    30 mL    Take as directed with meals. Total daily dose approx 30 units.    Type 2 diabetes mellitus, uncontrolled, with retinopathy (H)       insulin glargine 100 UNIT/ML injection    LANTUS SOLOSTAR    9 mL    Inject 30 Units Subcutaneous At Bedtime    Uncontrolled type 2 diabetes mellitus with mild nonproliferative retinopathy without macular  edema, with long-term current use of insulin (H)       insulin pen needle 31G X 5 MM     3 Box    3 Box See Admin Instructions Use four times a day times a day    Type 2 diabetes mellitus, uncontrolled (H)       metFORMIN 1000 MG tablet    GLUCOPHAGE    60 tablet    TAKE 1 TABLET BY MOUTH 2 TIMES DAILY WITH MEALS    Uncontrolled type 2 diabetes mellitus with hyperglycemia, with long-term current use of insulin (H)       Multi-vitamin Tabs tablet   Generic drug:  multivitamin, therapeutic with minerals      1 TABLET DAILY        nystatin 390288 UNIT/GM Powd    MYCOSTATIN    60 g    Apply 1 dose topically 3 times daily as needed    Skin rash       omeprazole 40 MG capsule    priLOSEC    90 capsule    Take 1 capsule (40 mg) by mouth daily Take 30-60 minutes before a meal.    Gastroesophageal reflux disease without esophagitis       order for DME     1 Units    Equipment being ordered: Cane    Knee osteoarthritis       order for DME     3 Month    Equipment being ordered:  Ostomy supplies.  Drain Pouch by [employer] #.    Also needs Barrier by [employer] #.    S/P colostomy (H)       PARoxetine 20 MG tablet    PAXIL    270 tablet    TAKE 3 TABLETS AT BEDTIME    Severe major depression with psychotic features (H)       pioglitazone 30 MG tablet    ACTOS    90 tablet    Take 1 tablet (30 mg) by mouth daily    Uncontrolled type 2 diabetes mellitus with hyperglycemia, with long-term current use of insulin (H), Insulin resistance       propranolol 60 MG 24 hr capsule    INDERAL LA    60 capsule    Take 1 capsule (60 mg) by mouth daily May take a second dose if tremor recurs.    Tremor       simvastatin 20 MG tablet    ZOCOR    90 tablet    TAKE 1 TABLET (20 MG) BY MOUTH AT BEDTIME    High cholesterol       SKIN PREP WIPES Misc     1 Bottle    to be used with colostomy care    Colostomy       traMADol 50 MG tablet    ULTRAM    60 tablet    Take 1-2 tablets ( mg) by mouth daily as needed for moderate pain     Arthritis of knee, Arthralgia of both knees       Universal Remover Wipes Misc     1 Each    uses to remove barrier from colostomy bag at time of change    Colostomy       vitamin D 1000 UNITS capsule     90 capsule    Take 2,000 to 4,000 Units per day

## 2017-06-27 NOTE — PROGRESS NOTES
Diabetes Self Management Training: Follow-up Visit    Holly Muir presents today for education and evaluation of glucose control Type 2 diabetes.    She is accompanied by self    Patient's diabetes management related comments/concerns: none    Patient would like this visit to be focused around the following diabetes-related behaviors and goals: none    ASSESSMENT:  Patient Problem List reviewed for relevant medical history and current medical status.   Pt verbalizes she is now taking 2 medications for mental health issues. Pt diagnosed with depression and psychosis with manifestation of hallucinations. States medication is helping decrease frequency of hallucinations.     Current Diabetes Management per Patient:  Taking diabetes medications? Yes:  Current doses: Metformin: 1000mg bid, Lantus: 38 units q 7-8pm, Novolo/ plus sliding scale at mealtime: Bg 200-250 +2 units,  251-300 +4u, 301-350 + 6u, > 350 + 6u (max).      Diabetes Medication(s)     Biguanides Sig    metFORMIN (GLUCOPHAGE) 1000 MG tablet TAKE 1 TABLET BY MOUTH 2 TIMES DAILY WITH MEALS    Insulin Sig    insulin glargine (LANTUS SOLOSTAR) 100 UNIT/ML injection Inject 30 Units Subcutaneous At Bedtime     Patient taking differently:  Inject 38 Units Subcutaneous At Bedtime     insulin aspart (NOVOLOG FLEXPEN) 100 UNIT/ML injection Take as directed with meals. Total daily dose approx 30 units.    Insulin Sensitizing Agents Sig    pioglitazone (ACTOS) 30 MG tablet Take 1 tablet (30 mg) by mouth daily        Patient glucose self monitoring as follows: 2-4 times daily.   BG meter: AccuChek Expert   BG results:   : 6a: 382, 10a: 308  : no checks  : no checks  : 3a: 366, 10a: 310  : 7a: 401, 11a: 392, 5p: 219  : 7a: 201, 4p: 98  : 7a: 250, 10a: 240, 3p: 175, 3a: 273    BG values are: Not in goal  Patient's most recent   Lab Results   Component Value Date    A1C 12.0 2017    is not meeting goal of  "<7.0    Nutrition:  Patient currently eats 3 meals per day. Likes to snack on fruit and peanut butter and jelly sandwiches during the day and middle of the night.     Breakfast - bowl of cereal with milk, coffee with milk and sugar substitute  Lunch - sandwich  Dinner - pork chops, potatoes, or rice & beans, tacos.  Snacks - fruit or peanut butter and jelly sandwich    Beverages: water, diet soda    Cultural/Mosque diet restrictions:      Biggest Challenge to Healthy Eating: emotional eating    Physical Activity:    States has recently started to walk 2-3 days a week for 15-20 minutes at a time.     Diabetes Complications:  Acute Complications: Symptoms of hypoglycemia? Shakey. Will drink about 4oz juice.     Vitals:  There were no vitals taken for this visit.  Estimated body mass index is 33.73 kg/(m^2) as calculated from the following:    Height as of 5/26/17: 1.575 m (5' 2\").    Weight as of 5/26/17: 83.6 kg (184 lb 6.4 oz).   Last 3 BP:   BP Readings from Last 3 Encounters:   05/26/17 103/57   02/24/17 118/66   01/19/17 123/64       History   Smoking Status     Former Smoker     Quit date: 8/9/2016   Smokeless Tobacco     Never Used       Labs:  Lab Results   Component Value Date    A1C 12.0 05/26/2017     Lab Results   Component Value Date     12/19/2016     Lab Results   Component Value Date    LDL 21 12/19/2016     HDL Cholesterol   Date Value Ref Range Status   12/19/2016 58 >49 mg/dL Final   ]  GFR Estimate   Date Value Ref Range Status   12/19/2016 66 >60 mL/min/1.7m2 Final     GFR Estimate If Black   Date Value Ref Range Status   12/19/2016 79 >60 mL/min/1.7m2 Final     Lab Results   Component Value Date    CR 0.88 12/19/2016     No results found for: MICROALBUMIN    Health Beliefs and Attitudes:   Patient Activation Measure Survey Score:  RADHA Score (Last Two) 9/15/2011 1/5/2012   RADHA Raw Score 30 35   Activation Score 37.3 45.2   RADHA Level 1 1       Stage of Change: CONTEMPLATION (Considering " change and yet undecided)    Progress toward meeting diabetes-related behavioral goals:  Pt does well with checking bg. Occasionally forgets to take po medication. Rarely forgets insulin.     Diabetes knowledge and skills assessment:     Patient is knowledgeable in diabetes management concepts related to: Healthy Eating, Being Active, Monitoring and Taking Medication    Patient needs further education on the following diabetes management concepts: Problem Solving    Barriers to Learning Assessment: Cognitive impairment. Patient suffers from depression and psychosis. Medication helping decrease episodes of hallicinations.Memory issues.     Based on learning assessment above, most appropriate setting for further diabetes education would be: Individual setting.    INTERVENTION:    Education provided today on:  AADE Self-Care Behaviors:  Taking Medication: Per pt, home healty nurse has divided medication into an AM pill box and PM pill box. Pt questions if she should take AM doses a bit later in the day if she forgets to take a scheduled time or just not take at all. Questions if she should take insulin for snacks.     Opportunities for ongoing education and support in diabetes-self management were discussed.    Pt verbalized understanding of concepts discussed and recommendations provided today.       Education Materials Provided:  No new materials provided today    PLAN:  Taking Medication:   If pt forgets to take AM doses of medication at scheduled time (11am in the Summer - likes to sleep in), can take at 1pm with lunch. Make sure there are at least 9 hours between AM and PM doses. Can take PM doses with a late evening snack instead of with dinner.   Continue to take Lantus at same time everyday: approx 7-8pm.  Take 2 units Novolog when eating a peanut butter and jelly sandwich as a snack, including during the day or middle of the night.       FOLLOW-UP:  Keep f/u appt with Endo    Ongoing plan for education and  support: Cde to call pt in 2 weeks for a bg report. If fasting bg levels have not improved, after taking Novolog in the middle of the night with a snack, will consider increasing lantus.     Time Spent:45 minutes  Encounter Type: Individual

## 2017-06-28 ENCOUNTER — TELEPHONE (OUTPATIENT)
Dept: FAMILY MEDICINE | Facility: CLINIC | Age: 62
End: 2017-06-28

## 2017-06-28 NOTE — TELEPHONE ENCOUNTER
Panel Management Review      Patient has the following on her problem list:     Depression / Dysthymia review  PHQ-9 SCORE 3/4/2016 5/18/2016 12/1/2016   Total Score - - -   Total Score 11 16 7      Patient is due for:  DAP    Diabetes    ASA: Passed    Last A1C  Lab Results   Component Value Date    A1C 12.0 05/26/2017    A1C 10.9 12/19/2016    A1C 10.8 05/25/2016    A1C 10.6 03/21/2016    A1C 10.0 11/09/2015     A1C tested: FAILED    Last LDL:    Lab Results   Component Value Date    CHOL 134 12/19/2016     Lab Results   Component Value Date    HDL 58 12/19/2016     Lab Results   Component Value Date    LDL 21 12/19/2016     Lab Results   Component Value Date    TRIG 274 12/19/2016     Lab Results   Component Value Date    CHOLHDLRATIO 2.0 09/18/2015     Lab Results   Component Value Date    NHDL 76 12/19/2016       Is the patient on a Statin? YES             Is the patient on Aspirin? YES    Medications     HMG CoA Reductase Inhibitors    simvastatin (ZOCOR) 20 MG tablet    Salicylates    aspirin 81 MG tablet          Last three blood pressure readings:  BP Readings from Last 3 Encounters:   05/26/17 103/57   02/24/17 118/66   01/19/17 123/64       Date of last diabetes office visit: 5/26/17     Tobacco History:     History   Smoking Status     Former Smoker     Quit date: 8/9/2016   Smokeless Tobacco     Never Used         Hypertension   Last three blood pressure readings:  BP Readings from Last 3 Encounters:   05/26/17 103/57   02/24/17 118/66   01/19/17 123/64     Blood pressure: Passed    HTN Guidelines:  Age 18-59 BP range:  Less than 140/90  Age 60-85 with Diabetes:  Less than 140/90  Age 60-85 without Diabetes:  less than 150/90      Composite cancer screening  Chart review shows that this patient is due/due soon for the following Mammogram and Fecal Colorectal (FIT)  Summary:    Patient is due/failing the following:   DAP, FIT and MAMMOGRAM    Action needed:   Patient needs referral/order: mammo and  FIT    Type of outreach:    sent letter    Questions for provider review:    Needs Depression action plan done                                                                                                                                    Dee Byrnes CMA       Chart routed to Saqib Diaz .

## 2017-06-28 NOTE — LETTER
[facility]  [address] [facility] [id number] [phone]          June 28, 2017    [facility]olly Muir  [address] [facility] [address]    Holly,      Our records indicate that you have not scheduled for a(n)mammogram which was recommended by your health care team.     [facility] now offers mammograms Thursdays, every other Monday, and Saturdays once a month.    Monitoring and managing your preventative and chronic health conditions are very important to us.     If you have received your health care elsewhere, please call the clinic so the information can be documented in your chart.    Please call [phone] or message us through your Gist account to schedule an appointment or provide information for your chart.     I look forward to seeing you and working with you on your health care needs.     Sincerely,       Your [facility]/rb              *If you have already scheduled an appointment, please disregard this reminder

## 2017-07-03 PROCEDURE — G0179 MD RECERTIFICATION HHA PT: HCPCS | Performed by: INTERNAL MEDICINE

## 2017-07-12 ENCOUNTER — OFFICE VISIT (OUTPATIENT)
Dept: URGENT CARE | Facility: URGENT CARE | Age: 62
End: 2017-07-12
Payer: MEDICARE

## 2017-07-12 ENCOUNTER — TELEPHONE (OUTPATIENT)
Dept: PEDIATRICS | Facility: CLINIC | Age: 62
End: 2017-07-12

## 2017-07-12 ENCOUNTER — RADIANT APPOINTMENT (OUTPATIENT)
Dept: GENERAL RADIOLOGY | Facility: CLINIC | Age: 62
End: 2017-07-12
Attending: PHYSICIAN ASSISTANT
Payer: MEDICARE

## 2017-07-12 VITALS
HEART RATE: 73 BPM | BODY MASS INDEX: 34.93 KG/M2 | OXYGEN SATURATION: 100 % | SYSTOLIC BLOOD PRESSURE: 127 MMHG | TEMPERATURE: 97.3 F | WEIGHT: 191 LBS | DIASTOLIC BLOOD PRESSURE: 71 MMHG

## 2017-07-12 DIAGNOSIS — S69.91XA RIGHT WRIST INJURY, INITIAL ENCOUNTER: Primary | ICD-10-CM

## 2017-07-12 PROCEDURE — 73110 X-RAY EXAM OF WRIST: CPT | Mod: RT

## 2017-07-12 PROCEDURE — 99213 OFFICE O/P EST LOW 20 MIN: CPT | Performed by: PHYSICIAN ASSISTANT

## 2017-07-12 NOTE — NURSING NOTE
"Chief Complaint   Patient presents with     Hand Problem       Initial /71  Pulse 73  Temp 97.3  F (36.3  C) (Oral)  Wt 191 lb (86.6 kg)  SpO2 100%  BMI 34.93 kg/m2 Estimated body mass index is 34.93 kg/(m^2) as calculated from the following:    Height as of 5/26/17: 5' 2\" (1.575 m).    Weight as of this encounter: 191 lb (86.6 kg).  Medication Reconciliation: complete       JERED Kumar      "

## 2017-07-12 NOTE — TELEPHONE ENCOUNTER
[facility] Call Center    Phone Message    Name of Caller: Meredith    Phone Number: Other phone number:  [phone]    Best time to return call: Any    May a detailed message be left on voicemail: yes    Relation to patient: Other Name: Meredith  Relationship:  Home Care Nurse  Is there legal documentation in chart to discuss information with this person: NA    Reason for Call: Meredith called to update Dr. King that Holly fell on 7/4 and has a sore R wrist.  Once her family returns they may take her to [place] for X-rays.  Thank you.     Action Taken: Message routed to:  Primary Care p [phone]

## 2017-07-12 NOTE — MR AVS SNAPSHOT
After Visit Summary   7/12/2017    Holly Muir    MRN: [id number]           Patient Information     Date Of Birth          1955        Visit Information        Provider Department      7/12/2017 6:40 PM Emily Loving PA-C [facility]        Today's Diagnoses     Right wrist injury, initial encounter    -  1       Follow-ups after your visit        Additional Services     ORTHO  REFERRAL       [facility] is referring you to the Orthopedic  Services at [facility].       The  Representative will assist you in the coordination of your Orthopedic and Musculoskeletal Care as prescribed by your physician.    The  Representative will call you within 1 business day to help schedule your appointment, or you may contact the  Representative at:    All areas ~ [phone]     Type of Referral : Non Surgical       Timeframe requested: 3 - 5 days    Coverage of these services is subject to the terms and limitations of your health insurance plan.  Please call member services at your health plan with any benefit or coverage questions.      If X-rays, CT or MRI's have been performed, please contact the facility where they were done to arrange for , prior to your scheduled appointment.  Please bring this referral request to your appointment and present it to your specialist.                  Your next 10 appointments already scheduled     Jul 24, 2017  3:15 PM CDT   Telephone Visit with Mg Mendez Provider   [facility])    [address] [place] [id number] [phone]           Note: this is not an onsite visit; there is no need to come to the facility.            Jul 26, 2017 10:40 AM CDT   Return Visit with Tiffany King MD   [facility])    [address] [place] [id number] [phone]  "           Sep 01, 2017  9:45 AM CDT   Return Visit with Elizabeth Medrano MD, MG ENDO NURSE   [facility] ([facility])    [address] [place] [address] [phone]              Who to contact     If you have questions or need follow up information about today's clinic visit or your schedule please contact [facility] directly at [phone].  Normal or non-critical lab and imaging results will be communicated to you by Wheelyhart, letter or phone within 4 business days after the clinic has received the results. If you do not hear from us within 7 days, please contact the clinic through Wheelyhart or phone. If you have a critical or abnormal lab result, we will notify you by phone as soon as possible.  Submit refill requests through [employer]akovka or call your pharmacy and they will forward the refill request to us. Please allow 3 business days for your refill to be completed.          Additional Information About Your Visit        Bardakovka Information     [employer]ka lets you send messages to your doctor, view your test results, renew your prescriptions, schedule appointments and more. To sign up, go to [email or web address] . Click on \"Log in\" on the left side of the screen, which will take you to the Welcome page. Then click on \"Sign up Now\" on the right side of the page.     You will be asked to enter the access code listed below, as well as some personal information. Please follow the directions to create your username and password.     Your access code is: [email or web address]  Expires: 2017 10:14 AM     Your access code will  in 90 days. If you need help or a new code, please call your [facility] or [phone].        Care EveryWhere ID     This is your Care EveryWhere ID. This could be used by other organizations to access your [facility] medical records  [id number]        Your Vitals Were     Pulse Temperature Pulse Oximetry BMI (Body Mass Index)    "       73 97.3  F (36.3  C) (Oral) 100% 34.93 kg/m2         Blood Pressure from Last 3 Encounters:   07/12/17 127/71   05/26/17 103/57   02/24/17 118/66    Weight from Last 3 Encounters:   07/12/17 191 lb (86.6 kg)   05/26/17 184 lb 6.4 oz (83.6 kg)   02/24/17 194 lb 3.2 oz (88.1 kg)              We Performed the Following     ORTHO  REFERRAL     XR Wrist Right G/E 3 Views          Today's Medication Changes          These changes are accurate as of: 7/12/17  7:09 PM.  If you have any questions, ask your nurse or doctor.               These medicines have changed or have updated prescriptions.        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   Quantity:  90 capsule   Refills:  3                Primary Care Provider Office Phone # Fax #    Tiffany King [phone]       [facility] [address]  [facility] [address]        Goals        General    I will continue to monitor my blood sugars and treat the results as directed. (pt-stated)     Notes - Note created  12/9/2015 10:26 AM by Randy Suarez RN    As of today's date 12/9/2015 goal is met at 0 - 25%.   Goal Status:  Active          Equal Access to Services     [facility] KEELY : Anusha Caban, waaxda luqadaha, qaybta kaalmajordana rosado, kim uriostegui. So [facility]  [phone].    ATENCIÓN: Si ramu medrano, tiene a lopez disposición servicios gratuitos de asistencia lingüística. Garrick lima [phone].    We comply with applicable federal civil rights laws and [place] laws. We do not discriminate on the basis of race, color, national origin, age, disability sex, sexual orientation or gender identity.            Thank you!     Thank you for choosing [facility]  [facility]. Our goal is always to provide you with excellent care. Hearing back from our patients is one way we can continue to improve our services. Please take a few minutes to complete the written survey that you may receive in the mail after your visit with us. Thank you!             Your Updated Medication List - Protect others around you: Learn how to safely use, store and throw away your medicines at www.disposemymeds.org.          This list is accurate as of: 7/12/17  7:09 PM.  Always use your most recent med list.                   Brand Name Dispense Instructions for use Diagnosis    ABILIFY 15 MG tablet   Generic drug:  ARIPiprazole     90 tablet    TAKE 1 TABLET BY MOUTH AT BEDTIME    Visual hallucination, Severe recurrent major depressive disorder with psychotic features (H)       aspirin 81 MG tablet     90 tablet    Take 1 tablet (81 mg) by mouth daily    Uncontrolled diabetes mellitus (H)       B-COMPLEX/B-12 PO      Take 1 tablet by mouth daily.        blood glucose monitoring lancets     9 Box    Use to test blood sugar 8 times daily or as directed.    Type 2 diabetes mellitus, uncontrolled (H)       blood glucose monitoring test strip    ACCU-CHEK CELIA    250 each    Use to test blood sugars 8 times daily or as directed.    Type 2 diabetes mellitus, uncontrolled (H)       buPROPion 150 MG 24 hr tablet    WELLBUTRIN XL    270 tablet    TAKE 3 TABLETS (450 MG) BY MOUTH EVERY MORNING    Visual hallucination, Severe recurrent major depressive disorder with psychotic features (H)        BusPIRone HCl 30 MG Tabs     60 tablet    TAKE 1 TABLET BY MOUTH TWICE A DAY    Anxiety       enalapril 2.5 MG tablet    VASOTEC    90 tablet    TAKE 1 TABLET BY MOUTH EVERY DAY    Diabetes mellitus with background retinopathy (H)       gabapentin 300 MG capsule    NEURONTIN    90 capsule    Take 1 capsule (300 mg) by mouth 3 times daily (Due for clinic appointment before future refills)    Diabetic polyneuropathy associated with type 2 diabetes mellitus (H)       ibuprofen 200 MG tablet    ADVIL/MOTRIN     Take 3-4 tablets by mouth every 4 hours as needed Reported on 2/24/2017        insulin aspart 100 UNIT/ML injection    NovoLOG FLEXPEN    30 mL    Take as directed with meals. Total daily dose approx 30 units.    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, with long-term current use of insulin (H)       insulin pen needle 31G X 5 MM     3 Box    3 Box See Admin Instructions Use four times a day times a day    Type 2 diabetes mellitus, uncontrolled (H)       metFORMIN 1000 MG tablet    GLUCOPHAGE    60 tablet    TAKE 1 TABLET BY MOUTH 2 TIMES DAILY WITH MEALS    Uncontrolled type 2 diabetes mellitus with hyperglycemia, with long-term current use of insulin (H)       Multi-vitamin Tabs tablet   Generic drug:  multivitamin, therapeutic with minerals      1 TABLET DAILY        nystatin 413183 UNIT/GM Powd    MYCOSTATIN    60 g    Apply 1 dose topically 3 times daily as needed    Skin rash       omeprazole 40 MG capsule    priLOSEC    90 capsule    Take 1 capsule (40 mg) by mouth daily Take 30-60 minutes before a meal.    Gastroesophageal reflux disease without esophagitis       order for DME     1 Units    Equipment being ordered: Cane    Knee osteoarthritis       order for DME     3  Month    Equipment being ordered:  Ostomy supplies.  Drain Pouch by [employer] #.    Also needs Barrier by Valarie #.    S/P colostomy (H)       PARoxetine 20 MG tablet    PAXIL    270 tablet    TAKE 3 TABLETS AT BEDTIME    Severe major depression with psychotic features (H)       pioglitazone 30 MG tablet    ACTOS    90 tablet    Take 1 tablet (30 mg) by mouth daily    Uncontrolled type 2 diabetes mellitus with hyperglycemia, with long-term current use of insulin (H), Insulin resistance       propranolol 60 MG 24 hr capsule    INDERAL LA    60 capsule    Take 1 capsule (60 mg) by mouth daily May take a second dose if tremor recurs.    Tremor       simvastatin 20 MG tablet    ZOCOR    90 tablet    TAKE 1 TABLET (20 MG) BY MOUTH AT BEDTIME    High cholesterol       SKIN PREP WIPES Misc     1 Bottle    to be used with colostomy care    Colostomy       traMADol 50 MG tablet    ULTRAM    60 tablet    Take 1-2 tablets ( mg) by mouth daily as needed for moderate pain    Arthritis of knee, Arthralgia of both knees       Universal Remover Wipes Misc     1 Each    uses to remove barrier from colostomy bag at time of change    Colostomy       vitamin D 1000 UNITS capsule     90 capsule    Take 2,000 to 4,000 Units per day

## 2017-07-21 ENCOUNTER — RADIANT APPOINTMENT (OUTPATIENT)
Dept: GENERAL RADIOLOGY | Facility: CLINIC | Age: 62
End: 2017-07-21
Attending: FAMILY MEDICINE
Payer: MEDICARE

## 2017-07-21 ENCOUNTER — OFFICE VISIT (OUTPATIENT)
Dept: ORTHOPEDICS | Facility: CLINIC | Age: 62
End: 2017-07-21
Payer: MEDICARE

## 2017-07-21 VITALS
SYSTOLIC BLOOD PRESSURE: 125 MMHG | DIASTOLIC BLOOD PRESSURE: 70 MMHG | WEIGHT: 191 LBS | BODY MASS INDEX: 35.15 KG/M2 | HEIGHT: 62 IN

## 2017-07-21 DIAGNOSIS — S69.91XD INJURY OF RIGHT WRIST, SUBSEQUENT ENCOUNTER: ICD-10-CM

## 2017-07-21 DIAGNOSIS — S69.91XD INJURY OF RIGHT WRIST, SUBSEQUENT ENCOUNTER: Primary | ICD-10-CM

## 2017-07-21 PROCEDURE — 73110 X-RAY EXAM OF WRIST: CPT | Mod: RT

## 2017-07-21 PROCEDURE — 99203 OFFICE O/P NEW LOW 30 MIN: CPT | Performed by: FAMILY MEDICINE

## 2017-07-21 NOTE — NURSING NOTE
"Chief Complaint   Patient presents with     Musculoskeletal Problem     right wrist pain > 2 weeks       Initial /70  Ht 5' 2\" (1.575 m)  Wt 191 lb (86.6 kg)  BMI 34.93 kg/m2 Estimated body mass index is 34.93 kg/(m^2) as calculated from the following:    Height as of this encounter: 5' 2\" (1.575 m).    Weight as of this encounter: 191 lb (86.6 kg).  Medication Reconciliation: complete     Marino Ervin, ATC  "

## 2017-07-21 NOTE — PROGRESS NOTES
Holly Muir  :  1955  DOS: 2017  MRN: [id number]    Sports Medicine Clinic Visit    PCP: Tiffany King    Holly Muir is a 62 year old Right hand dominant female who is seen in consultation at the request of Emily Loving PA-C presenting with right wrist/hand injury.    Injury: about 1.5 weeks ago Holly fell out of her chair while adjusting it and had a FOOSH injury.  She was seen in [facility] where her XR appeared negative at that time and she was given a wrist splint which was comfortable and she has used intermittently.  There is pain when she removes it for cleaning, washing dishes, showering.  She has no previous injury to this wrist.  It remains swollen.  She denies weakness or paresthesias.  She has notused a sling.  Has used tramadol and tylenol for pain relief, along with ice on occasion.      Social History: unemployed    Review of Systems  Musculoskeletal: as above  Remainder of review of systems is negative including constitutional, CV, pulmonary, GI, Skin and Neurologic except as noted in HPI or medical history.    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     Past Surgical History:   Procedure Laterality Date     ARTHROSCOPY KNEE RT/LT      LT     BREAST LUMPECTOMY, RT/LT      LT-benign      C EXCIS PTERYGIUM  10/08    [facility]     C STOMACH SURGERY PROCEDURE UNLISTED       COLONOSCOPY  5/10/2012    Procedure:COLONOSCOPY; screening colonoscopy; Surgeon:MILLA VEGA; Location:[facility] OR     COLOSTOMY       COLOSTOMY       HC COLONOSCOPY THRU STOMA, DIAGNOSTIC      normal per report-no records available-records destroyed     HYSTERECTOMY, PAP NO  "LONGER INDICATED      done in [place]-cervical cancer     IMPLANT STIMULATOR SACRAL NERVE STAGE ONE Right 3/10/2015    Procedure: IMPLANT STIMULATOR SACRAL NERVE STAGE ONE;  Surgeon: Teodora Yusuf MD;  Location: [facility] OR     IMPLANT STIMULATOR SACRAL NERVE STAGE TWO Right 3/31/2015    Procedure: IMPLANT STIMULATOR SACRAL NERVE STAGE TWO;  Surgeon: Teodora Yusuf MD;  Location: [facility] OR     KNEE SURGERY       SALPINGO OOPHORECTOMY,R/L/JENNIFER      Salpingo Oophorectomy, RT/LT/JENNIFER       Objective  /70  Ht 5' 2\" (1.575 m)  Wt 191 lb (86.6 kg)  BMI 34.93 kg/m2    General: healthy, alert and in no distress    HEENT: no scleral icterus or conjunctival erythema   Skin: no suspicious lesions or rash. No jaundice.   CV: regular rhythm by palpation, 2+ distal pulses, no pedal edema    Resp: normal respiratory effort without conversational dyspnea   Psych: normal mood and affect    Gait: nonantalgic, appropriate coordination and balance   Neuro: normal light touch sensory exam of the extremities. Motor strength as noted below     Right Wrist and Hand exam    Inspection:       Swelling: mild about distal wrist, more radial    Tender:       distal radius right    Non Tender:       Remainder of the Wrist and Hand right,      anatomic snuffbox right,      scapholunate interval right and      TFCC right    ROM:       Decreased active and passive ROM of the Wrist with flexion, extension, radial deviation, ulnar deviation and tested minimally to ensure good motor function, otherwise full testing deferred due to fracture right    Strength:       Deferred full testing but all motor function intact    Neurovascular:       2+ radial pulses bilaterally with brisk capillary refill and      normal sensation to light touch in the radial, median and ulnar nerve distributions    Right Elbow exam:  Full strength and ROM without pain, no bony TTP  No instability, laxity or pain with valgus or varus stress  No shoulder or neck pain, " full ROM      Radiology:  Recent Results (from the past 744 hour(s))   XR Wrist Right G/E 3 Views    Narrative    RIGHT WRIST THREE OR MORE VIEWS  7/12/2017 7:06 PM     HISTORY: Unspecified injury of right wrist, hand and finger(s),  initial encounter.    COMPARISON: None.      Impression    IMPRESSION: Bones are normally aligned. Radiocarpal joint space is  diminished. No acute fracture.    SRINIVASA DORSEY MD   XR Wrist Right G/E 3 Views    Narrative    RIGHT WRIST THREE VIEWS July 21, 2017 4:36 PM     HISTORY: Carpometacarpal joint versus snuff box pain following trauma.    COMPARISON: 7/12/2017.      Impression    IMPRESSION: There is nondisplaced fracture of the distal radius seen  as radiolucency in the endosteal bone and a small cortical wrinkle  dorsally on the lateral view. The carpus is intact. The fracture is  not visible even in retrospect on the previous exam.    CHADD CUMMINS MD         Assessment:  1. Injury of right wrist, subsequent encounter        Plan:  Discussed the assessment with the patient.  Follow up: 1 week  Still persistent swelling, switch to wrist brace which is comfortable  Consider casting next week vs Exos  XR images independently visualized and reviewed with patient today in clinic  Intraarticular distal radius fracture  Expect likely 6 weeks of immobilization  Home handouts provided and supportive care reviewed  All questions were answered today  Contact us with additional questions or concerns  Signs and sx of concern reviewed      Christopher Molina DO, GARY  Primary Care Sports Medicine  [facility]             Disclaimer: This note consists of symbols derived from keyboarding, dictation and/or voice recognition software. As a result, there may be errors in the script that have gone undetected. Please consider this when interpreting information found in this chart.

## 2017-07-24 ENCOUNTER — TELEPHONE (OUTPATIENT)
Dept: ENDOCRINOLOGY | Facility: CLINIC | Age: 62
End: 2017-07-24

## 2017-07-24 RX ORDER — LORATADINE 10 MG
TABLET ORAL
Qty: 90 TABLET | Refills: 1 | Status: SHIPPED | OUTPATIENT
Start: 2017-07-24 | End: 2018-01-18

## 2017-07-24 NOTE — TELEPHONE ENCOUNTER
Spoke with pt for a bg report.  07/24: F: 243  07/23: F: 208, D: 235  07/21: L: 208, d: 238, 1am: 205  07/20: F: 233, L; 176  07/18: F: 98  07/17: F: 84  Pt advised no need for dose changes at this time. Please keep appt with pcp this week. Take bg meter to that appt. Pt verbalized understandiing.    Mikayla Sellers, RN, BSN, CDE   [facility]

## 2017-07-24 NOTE — TELEPHONE ENCOUNTER
[facility] ASPIRIN ADULT LOW DOSE 81 MG chewable tablet  Last Written Prescription Date: 5/18/2016  Last Fill Quantity: 90,  # refills: 3   Last Office Visit with G, P or [facility] prescribing provider: 12/19/2016                                         Next 5 appointments (look out 90 days)     Jul 24, 2017  3:15 PM CDT   Telephone Visit with Mg Cde Provider   [facility] ([facility])    [address] [place] [id number]   [phone]            Jul 26, 2017 10:40 AM CDT   Return Visit with Tiffany King MD   [facility])    [address] [place] [id number] [phone]            Sep 01, 2017  9:45 AM CDT   Return Visit with Elizabeth Medrano MD, MG ENDO NURSE   [facility])    [address] [place] [phone]   [phone]                 Refilled per [facility] protocol.    Helena Dickson, RN

## 2017-07-26 ENCOUNTER — OFFICE VISIT (OUTPATIENT)
Dept: ORTHOPEDICS | Facility: CLINIC | Age: 62
End: 2017-07-26
Payer: MEDICARE

## 2017-07-26 ENCOUNTER — OFFICE VISIT (OUTPATIENT)
Dept: PEDIATRICS | Facility: CLINIC | Age: 62
End: 2017-07-26
Payer: MEDICARE

## 2017-07-26 VITALS
WEIGHT: 187 LBS | HEIGHT: 62 IN | SYSTOLIC BLOOD PRESSURE: 128 MMHG | BODY MASS INDEX: 34.41 KG/M2 | DIASTOLIC BLOOD PRESSURE: 78 MMHG

## 2017-07-26 VITALS
OXYGEN SATURATION: 99 % | SYSTOLIC BLOOD PRESSURE: 130 MMHG | HEIGHT: 62 IN | TEMPERATURE: 97 F | WEIGHT: 187 LBS | BODY MASS INDEX: 34.41 KG/M2 | HEART RATE: 78 BPM | DIASTOLIC BLOOD PRESSURE: 80 MMHG

## 2017-07-26 DIAGNOSIS — Z12.31 ENCOUNTER FOR SCREENING MAMMOGRAM FOR BREAST CANCER: ICD-10-CM

## 2017-07-26 DIAGNOSIS — Z12.11 SPECIAL SCREENING FOR MALIGNANT NEOPLASMS, COLON: ICD-10-CM

## 2017-07-26 DIAGNOSIS — S69.91XD INJURY OF RIGHT WRIST, SUBSEQUENT ENCOUNTER: Primary | ICD-10-CM

## 2017-07-26 DIAGNOSIS — F32.3 SEVERE MAJOR DEPRESSION WITH PSYCHOTIC FEATURES (H): ICD-10-CM

## 2017-07-26 DIAGNOSIS — F33.3 SEVERE RECURRENT MAJOR DEPRESSIVE DISORDER WITH PSYCHOTIC FEATURES (H): ICD-10-CM

## 2017-07-26 DIAGNOSIS — B07.8 COMMON WART: ICD-10-CM

## 2017-07-26 DIAGNOSIS — R44.1 VISUAL HALLUCINATION: ICD-10-CM

## 2017-07-26 DIAGNOSIS — S52.571D OTHER CLOSED INTRA-ARTICULAR FRACTURE OF DISTAL END OF RIGHT RADIUS WITH ROUTINE HEALING, SUBSEQUENT ENCOUNTER: ICD-10-CM

## 2017-07-26 DIAGNOSIS — E11.42 DIABETIC POLYNEUROPATHY ASSOCIATED WITH TYPE 2 DIABETES MELLITUS (H): Primary | ICD-10-CM

## 2017-07-26 PROCEDURE — 99214 OFFICE O/P EST MOD 30 MIN: CPT | Mod: 25 | Performed by: INTERNAL MEDICINE

## 2017-07-26 PROCEDURE — 99213 OFFICE O/P EST LOW 20 MIN: CPT | Performed by: FAMILY MEDICINE

## 2017-07-26 PROCEDURE — 17110 DESTRUCTION B9 LES UP TO 14: CPT | Performed by: INTERNAL MEDICINE

## 2017-07-26 RX ORDER — BUPROPION HYDROCHLORIDE 150 MG/1
TABLET ORAL
COMMUNITY
Start: 2017-07-26 | End: 2019-10-08 | Stop reason: DRUGHIGH

## 2017-07-26 RX ORDER — GABAPENTIN 300 MG/1
CAPSULE ORAL
Qty: 360 CAPSULE | Refills: 1 | Status: SHIPPED | OUTPATIENT
Start: 2017-07-26 | End: 2018-02-10

## 2017-07-26 RX ORDER — ARIPIPRAZOLE 20 MG/1
20 TABLET ORAL DAILY
COMMUNITY
Start: 2017-07-26 | End: 2018-08-15

## 2017-07-26 RX ORDER — PAROXETINE 20 MG/1
TABLET, FILM COATED ORAL
COMMUNITY
Start: 2017-07-26 | End: 2018-08-15

## 2017-07-26 NOTE — NURSING NOTE
"Chief Complaint   Patient presents with     Fracture Followup     f/u right wrist fracture > 3 weeks       Initial /78  Ht 5' 2\" (1.575 m)  Wt 187 lb (84.8 kg)  BMI 34.2 kg/m2 Estimated body mass index is 34.2 kg/(m^2) as calculated from the following:    Height as of this encounter: 5' 2\" (1.575 m).    Weight as of this encounter: 187 lb (84.8 kg).  Medication Reconciliation: complete     Marino Ervin, ATC  "

## 2017-07-26 NOTE — MR AVS SNAPSHOT
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     You may schedule repeat freezing for the wart if not gone in 3 weeks.       Medication(s) prescribed today:    Orders Placed This Encounter   Medications     gabapentin (NEURONTIN) 300 MG capsule     Si capsule twice a day during the day and 2 capsules at night time     Dispense:  360 capsule     Refill:  1           Wart freezing:  The treated area is likely to blister within a few hours. Sometimes the blister is clear and sometimes it is red or purple because of bleeding (this is harmless). Treatment near the eye may result in a puffy eyelid, especially the following morning, but the swelling settles within a few days. Within a few days a scab forms and the blister gradually dries up.   Usually no special attention is needed during the healing phase. The treated area may be gently washed once or twice daily, and should be kept clean. A dressing is optional, but is advisable if the affected area is subject to trauma or clothes rub on it.   When the blister dries to a scab, apply petroleum jelly (Vaseline) and avoid picking at it. The scab peels off after 5-10 days on the face and 3 weeks on the hand. A sore  or scab may persist as long as 3 months on the lower leg because healing in this site is often slow.   Secondary infection is uncommon. When it occurs it may cause increased pain, swelling, thick yellow blister fluid, a purulent discharge and/or redness around the treated area. Consult your doctor if you are concerned: topical antiseptics and/or oral antibiotics may be necessary.                    Follow-ups after your visit        Your next 10 appointments already scheduled     Jul 26, 2017  2:40 PM CDT   Return Visit with Christopher Molina DO   [facility] [place] ([facility] [place])    [address]  [place] [address]   [phone]            Sep 01, 2017  9:45 AM CDT   Return Visit with Elizabeth Medrano MD, MG ENDO NURSE   [facility] [place] ([facility])    [address] [place] [id number]   [phone]              Future tests that were ordered for you today     Open Future Orders        Priority Expected Expires Ordered    Fecal colorectal cancer screen (FIT) Routine 8/16/2017 10/18/2017 7/26/2017    MA Screening Digital Bilateral Routine  7/26/2018 7/26/2017            Who to contact     If you have questions or need follow up information about today's clinic visit or your schedule please contact [facility] directly at [phone].  Normal or non-critical lab and imaging results will be communicated to you by MyChart, letter or phone within 4 business days after the clinic has received the results. If you do not hear from us within 7 days, please contact the clinic through MyChart or phone. If you have a critical or abnormal lab result, we will notify you by phone as soon as possible.  Submit refill requests through [employer] or call your pharmacy and they will forward the refill request to us. Please allow 3 business days for your refill to be completed.          Additional Information  "About Your Visit        [employer] Information     [employer] is an electronic gateway that provides easy, online access to your medical records. With [employer], you can request a clinic appointment, read your test results, renew a prescription or communicate with your care team.     To sign up for [employer] visit the website at [email or web address]   You will be asked to enter the access code listed below, as well as some personal information. Please follow the directions to create your username and password.     Your access code is: [email or web address]  Expires: 2017 10:14 AM     Your access code will  in 90 days. If you need help or a new code, please contact your [facility] or call [phone] for assistance.        Care EveryWhere ID     This is your Care EveryWhere ID. This could be used by other organizations to access your [place] medical records  [id number]        Your Vitals Were     Pulse Temperature Height Pulse Oximetry BMI (Body Mass Index)       78 97  F (36.1  C) (Temporal) 5' 2\" (1.575 m) 99% 34.2 kg/m2        Blood Pressure from Last 3 Encounters:   17 130/80   17 125/70   17 127/71    Weight from Last 3 Encounters:   17 187 lb (84.8 kg)   17 191 lb (86.6 kg)   17 191 lb (86.6 kg)              We Performed the Following     DEPRESSION ACTION PLAN (DAP)          Today's Medication Changes          These changes are accurate as of: 17 11:08 AM.  If you have any questions, ask your nurse or doctor.               These medicines have changed or have updated prescriptions.        Dose/Directions    ARIPiprazole 20 MG tablet   Commonly known as:  ABILIFY   This may have changed:  See the new instructions.   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 Isael Jiménez " at [facility]   Refills:  0       buPROPion 150 MG 24 hr tablet   Commonly known as:  WELLBUTRIN XL   This may have changed:  See the new instructions.   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)   Changed by:  Tiffany King MD        TAKE 3 TABLETS (450 MG) BY MOUTH EVERY MORNING   Refills:  0       gabapentin 300 MG capsule   Commonly known as:  NEURONTIN   This may have changed:    - how much to take  - how to take this  - when to take this  - additional instructions   Used for:  Diabetic polyneuropathy associated with type 2 diabetes mellitus (H)   Changed by:  Tiffany King MD        1 capsule twice a day during the day and 2 capsules at night time   Quantity:  360 capsule   Refills:  1       insulin glargine 100 UNIT/ML injection   Commonly known as:  LANTUS SOLOSTAR   This may have changed:  how much to take   Used for:  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   Quantity:  90 capsule   Refills:  3       PARoxetine 20 MG tablet   Commonly known as:  PAXIL   This may have changed:  See the new instructions.   Used for:  Severe major depression with psychotic features (H)   Changed by:  Tiffany King MD        TAKE 3 TABLETS AT BEDTIME (Prescribed by Dr. Isael Jiménez at [facility])   Refills:  0            Where to get your medicines      These medications were sent to [id number] - [place], [place] - [address] [address]  [address] [address] [place]  [facility] [address]     Phone:  [phone]     gabapentin 300 MG capsule                Primary Care Provider Office Phone # Fax #    Tiffany King [phone]       [facility] [address]  [facility] [address]        Goals        General    I will continue to monitor my blood sugars and treat the results as directed. (pt-stated)     Notes - Note created  12/9/2015 10:26 AM by Randy Suarez RN    As of today's date 12/9/2015 goal is met at 0 - 25%.   Goal Status:  Active          Equal Access to Services     [facility]: Hadii aad ku hadasho Soomaali, waaxda luqadaha, qaybta kaalmada adeegyada, kim celeste hayaan adechristopher kharanoman iqbal . So [facility] [phone].    ATENCIÓN: Si habla español, tiene a lopez disposición servicios gratuitos de asistencia lingüística. Llame al [phone].    We comply with applicable federal civil rights laws and [place] laws. We do not discriminate on the basis of race, color, national origin, age, disability sex, sexual orientation or gender identity.            Thank you!     Thank you for choosing [facility]  for your care. Our goal is always to provide you with excellent care. Hearing back from our patients is one way we can continue to improve our services. Please take a few minutes to complete the written survey that you may receive in the mail after your visit with us. Thank you!             Your Updated Medication List - Protect others around you: Learn how to safely use, store and throw away your medicines at www.disposemymeds.org.          This list is accurate as of: 7/26/17 11:08 AM.  Always use your most recent med list.                   Brand Name Dispense Instructions for use Diagnosis    ARIPiprazole 20 MG tablet    ABILIFY     Take 1 tablet (20 mg) by mouth daily Prescribed by Dr. 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        blood glucose monitoring lancets     9 Box    Use to test blood sugar 8 times daily or as directed.    Type 2 diabetes mellitus, uncontrolled (H)       blood glucose monitoring test strip    ACCU-CHEK CELIA    250 each    Use to test blood sugars 8 times daily or as directed.    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)       BusPIRone HCl 30 MG Tabs     60 tablet    TAKE 1 TABLET BY MOUTH TWICE A DAY    Anxiety       [facility] ASPIRIN ADULT LOW DOSE 81 MG chewable tablet   Generic drug:  aspirin     90 tablet    TAKE 1 TABLET (81 MG) BY MOUTH DAILY ***OTC- NOT COVERED BY INS***    Uncontrolled diabetes mellitus (H)       enalapril 2.5 MG tablet    VASOTEC    90 tablet    TAKE 1 TABLET BY MOUTH EVERY DAY    Diabetes mellitus with background retinopathy (H)       gabapentin 300 MG capsule    NEURONTIN    360 capsule    1 capsule twice a day during the day and 2 capsules at night time    Diabetic polyneuropathy associated with type 2 diabetes mellitus (H)       ibuprofen 200 MG tablet    ADVIL/MOTRIN     Take 3-4 tablets by mouth every 4 hours as needed Reported on 2/24/2017        insulin aspart 100 UNIT/ML injection    NovoLOG FLEXPEN    30 mL    Take as directed with meals. Total daily dose approx 30 units.    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, with long-term current use of insulin (H)       insulin pen needle 31G X 5 MM     3 Box    3 Box See Admin Instructions Use four times a day times a day    Type 2 diabetes mellitus,  uncontrolled (H)       metFORMIN 1000 MG tablet    GLUCOPHAGE    60 tablet    TAKE 1 TABLET BY MOUTH 2 TIMES DAILY WITH MEALS    Uncontrolled type 2 diabetes mellitus with hyperglycemia, with long-term current use of insulin (H)       Multi-vitamin Tabs tablet   Generic drug:  multivitamin, therapeutic with minerals      1 TABLET DAILY        nystatin 530299 UNIT/GM Powd    MYCOSTATIN    60 g    Apply 1 dose topically 3 times daily as needed    Skin rash       omeprazole 40 MG capsule    priLOSEC    90 capsule    Take 1 capsule (40 mg) by mouth daily Take 30-60 minutes before a meal.    Gastroesophageal reflux disease without esophagitis       order for DME     1 Units    Equipment being ordered: Cane    Knee osteoarthritis       order for DME     3 Month    Equipment being ordered:  Ostomy supplies.  Drain Pouch by [employer] #.    Also needs Barrier by [employer] #.    S/P colostomy (H)       PARoxetine 20 MG tablet    PAXIL     TAKE 3 TABLETS AT BEDTIME (Prescribed by Dr. Isael Jiménez at [facility])    Severe major depression with psychotic features (H)       pioglitazone 30 MG tablet    ACTOS    90 tablet    Take 1 tablet (30 mg) by mouth daily    Uncontrolled type 2 diabetes mellitus with hyperglycemia, with long-term current use of insulin (H), Insulin resistance       propranolol 60 MG 24 hr capsule    INDERAL LA    60 capsule    Take 1 capsule (60 mg) by mouth daily May take a second dose if tremor recurs.    Tremor       simvastatin 20 MG tablet    ZOCOR    90 tablet    TAKE 1 TABLET (20 MG) BY MOUTH AT BEDTIME    High cholesterol       SKIN PREP WIPES Misc     1 Bottle    to be used with colostomy care    Colostomy       traMADol 50 MG tablet    ULTRAM    60 tablet    Take 1-2 tablets ( mg) by mouth daily as needed for moderate pain    Arthritis of knee, Arthralgia of both knees       Universal Remover Wipes Misc     1 Each    uses to remove barrier from colostomy bag at time of  change    Colostomy       vitamin D 1000 UNITS capsule     90 capsule    Take 2,000 to 4,000 Units per day

## 2017-07-26 NOTE — PROGRESS NOTES
Holly Muir  :  1955  DOS: 2017  MRN: [id number]    Sports Medicine Clinic Visit    PCP: Tiffany King    Holly Muir is a 62 year old Right hand dominant female who is seen in consultation at the request of Emily Loving PA-C presenting with right wrist/hand injury.    Injury: about 1.5 weeks ago Holly fell out of her chair while adjusting it and had a FOOSH injury.  She was seen in [facility] where her XR appeared negative at that time and she was given a wrist splint which was comfortable and she has used intermittently.  There is pain when she removes it for cleaning, washing dishes, showering.  She has no previous injury to this wrist.  It remains swollen.  She denies weakness or paresthesias.  She has notused a sling.  Has used tramadol and tylenol for pain relief, along with ice on occasion.      Social History: unemployed    Interim History 2017  Holly Muir is now 2 weeks out from injury.  Since last visit on 2017 patient continues to have mild discomfort in splint.  Presents today for cast placement, as discussed on phone with Dr Molina on .         Review of Systems  Musculoskeletal: as above  Remainder of review of systems is negative including constitutional, CV, pulmonary, GI, Skin and Neurologic except as noted in HPI or medical history.    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     Past Surgical History:   Procedure Laterality Date     ARTHROSCOPY KNEE RT/LT      LT     BREAST LUMPECTOMY, RT/LT      LT-benign      C EXCIS PTERYGIUM  10/08    [facility]     C STOMACH SURGERY PROCEDURE UNLISTED       COLONOSCOPY  5/10/2012     "Procedure:COLONOSCOPY; screening colonoscopy; Surgeon:MILAL VEGA; Location:[facility] OR     COLOSTOMY  2000     COLOSTOMY       [facility] COLONOSCOPY THRU STOMA, DIAGNOSTIC  2002    normal per report-no records available-records destroyed     HYSTERECTOMY, PAP NO LONGER INDICATED      done in [place]-cervical cancer     IMPLANT STIMULATOR SACRAL NERVE STAGE ONE Right 3/10/2015    Procedure: IMPLANT STIMULATOR SACRAL NERVE STAGE ONE;  Surgeon: Teodora Yusuf MD;  Location: [facility] OR     IMPLANT STIMULATOR SACRAL NERVE STAGE TWO Right 3/31/2015    Procedure: IMPLANT STIMULATOR SACRAL NERVE STAGE TWO;  Surgeon: Teodora Yusuf MD;  Location: [facility] OR     KNEE SURGERY       SALPINGO OOPHORECTOMY,R/L/JENNIFER      Salpingo Oophorectomy, RT/LT/JENNIFER       Objective  /78  Ht 5' 2\" (1.575 m)  Wt 187 lb (84.8 kg)  BMI 34.2 kg/m2    General: healthy, alert and in no distress    HEENT: no scleral icterus or conjunctival erythema   Skin: no suspicious lesions or rash. No jaundice.   CV: regular rhythm by palpation, 2+ distal pulses, no pedal edema    Resp: normal respiratory effort without conversational dyspnea   Psych: normal mood and affect    Gait: nonantalgic, appropriate coordination and balance   Neuro: normal light touch sensory exam of the extremities. Motor strength as noted below     Right Wrist and Hand exam    Inspection:       Swelling: mild about distal wrist, more radial    Tender:       distal radius right    Non Tender:       Remainder of the Wrist and Hand right,      anatomic snuffbox right,      scapholunate interval right and      TFCC right    ROM:       Decreased active and passive ROM of the Wrist with flexion, extension, radial deviation, ulnar deviation and tested minimally to ensure good motor function, otherwise full testing deferred due to fracture right    Strength:       Deferred full testing but all motor function intact    Neurovascular:       2+ radial pulses bilaterally with brisk capillary " refill and      normal sensation to light touch in the radial, median and ulnar nerve distributions    Right Elbow exam:  Full strength and ROM without pain, no bony TTP  No instability, laxity or pain with valgus or varus stress  No shoulder or neck pain, full ROM      Radiology:  Recent Results (from the past 744 hour(s))   XR Wrist Right G/E 3 Views    Narrative    RIGHT WRIST THREE OR MORE VIEWS  7/12/2017 7:06 PM     HISTORY: Unspecified injury of right wrist, hand and finger(s),  initial encounter.    COMPARISON: None.      Impression    IMPRESSION: Bones are normally aligned. Radiocarpal joint space is  diminished. No acute fracture.    SRINIVASA DORSEY MD   XR Wrist Right G/E 3 Views    Narrative    RIGHT WRIST THREE VIEWS July 21, 2017 4:36 PM     HISTORY: Carpometacarpal joint versus snuff box pain following trauma.    COMPARISON: 7/12/2017.      Impression    IMPRESSION: There is nondisplaced fracture of the distal radius seen  as radiolucency in the endosteal bone and a small cortical wrinkle  dorsally on the lateral view. The carpus is intact. The fracture is  not visible even in retrospect on the previous exam.    CHADD CUMMINS MD         Assessment:  1. Injury of right wrist, subsequent encounter    2. Other closed intra-articular fracture of distal end of right radius with routine healing, subsequent encounter        Plan:  Discussed the assessment with the patient.  Follow up: 3 weeks  Improving swelling, switch to wrist brace which is comfortable  Exos placed today, limits pronation/supination pain , no pain with thumb mvmt  meunster cast +/- thumb spica also considered but do not seem necessary at this time  Call us with increasing pain in Exos  XR images independently visualized and reviewed with patient today in clinic  Intraarticular distal radius fracture  Expect likely 6 weeks of immobilization total, will follow clinically  Potential for some ongoing pain given intraarticular reviewed again  today  Home handouts provided and supportive care reviewed  All questions were answered today  Contact us with additional questions or concerns  Signs and sx of concern reviewed      Christopher Molina DO, CAJEANIE  Primary Care Sports Medicine  [facility]             Disclaimer: This note consists of symbols derived from keyboarding, dictation and/or voice recognition software. As a result, there may be errors in the script that have gone undetected. Please consider this when interpreting information found in this chart.

## 2017-07-26 NOTE — LETTER
My Depression Action Plan  Name: Holly Muir   Date of Birth 1955  Date: 7/26/2017    My doctor: Tiffany King   My clinic: [address] [address] [phone]          GREEN    ZONE   Good Control    What it looks like:     Things are going generally well. You have normal up s and down s. You may even feel depressed from time to time, but bad moods usually last less than a day.   What you need to do:  1. Continue to care for yourself (see self care plan)  2. Check your depression survival kit and update it as needed  3. Follow your physician s recommendations including any medication.  4. Do not stop taking medication unless you consult with your physician first.           YELLOW         ZONE Getting Worse    What it looks like:     Depression is starting to interfere with your life.     It may be hard to get out of bed; you may be starting to isolate yourself from others.    Symptoms of depression are starting to last most all day and this has happened for several days.     You may have suicidal thoughts but they are not constant.   What you need to do:     1. Call your care team, your response to treatment will improve if you keep your care team informed of your progress. Yellow periods are signs an adjustment may need to be made.     2. Continue your self-care, even if you have to fake it!    3. Talk to someone in your support network    4. Open up your depression survival kit           RED    ZONE Medical Alert - Get Help    What it looks like:     Depression is seriously interfering with your life.     You may experience these or other symptoms: You can t get out of bed most days, can t work or engage in other necessary activities, you have trouble taking care of basic hygiene, or basic responsibilities, thoughts of suicide or death that will not go away, self-injurious behavior.     What you need to do:  1. Call your care team and request a same-day  appointment. If they are not available (weekends or after hours) call your local crisis line, emergency room or 911.      Electronically signed by: Tiffany King MD, July 26, 2017    Depression Self Care Plan / Survival Kit    Self-Care for Depression  Here s the deal. Your body and mind are really not as separate as most people think.  What you do and think affects how you feel and how you feel influences what you do and think. This means if you do things that people who feel good do, it will help you feel better.  Sometimes this is all it takes.  There is also a place for medication and therapy depending on how severe your depression is, so be sure to consult with your medical provider and/ or Behavioral Health Consultant if your symptoms are worsening or not improving.     In order to better manage my stress, I will:    Exercise  Get some form of exercise, every day. This will help reduce pain and release endorphins, the  feel good  chemicals in your brain. This is almost as good as taking antidepressants!  This is not the same as joining a gym and then never going! (they count on that by the way ) It can be as simple as just going for a walk or doing some gardening, anything that will get you moving.      Hygiene   Maintain good hygiene (Get out of bed in the morning, Make your bed, Brush your teeth, Take a shower, and Get dressed like you were going to work, even if you are unemployed).  If your clothes don't fit try to get ones that do.    Diet  I will strive to eat foods that are good for me, drink plenty of water, and avoid excessive sugar, caffeine, alcohol, and other mood-altering substances.  Some foods that are helpful in depression are: complex carbohydrates, B vitamins, flaxseed, fish or fish oil, fresh fruits and vegetables.    Psychotherapy  I agree to participate in Individual Therapy (if recommended).    Medication  If prescribed medications, I agree to take them.  Missing doses can result in serious  side effects.  I understand that drinking alcohol, or other illicit drug use, may cause potential side effects.  I will not stop my medication abruptly without first discussing it with my provider.    Staying Connected With Others  I will stay in touch with my friends, family members, and my primary care provider/team.    Use your imagination  Be creative.  We all have a creative side; it doesn t matter if it s oil painting, sand castles, or mud pies! This will also kick up the endorphins.    Witness Beauty  (AKA stop and smell the roses) Take a look outside, even in mid-winter. Notice colors, textures. Watch the squirrels and birds.     Service to others  Be of service to others.  There is always someone else in need.  By helping others we can  get out of ourselves  and remember the really important things.  This also provides opportunities for practicing all the other parts of the program.    Humor  Laugh and be silly!  Adjust your TV habits for less news and crime-drama and more comedy.    Control your stress  Try breathing deep, massage therapy, biofeedback, and meditation. Find time to relax each day.     My support system    Clinic Contact:  Phone number:    Contact 1:  Phone number:    Contact 2:  Phone number:    [employer]/:  Phone number:    Therapist:  Phone number:    Local crisis center:    Phone number:    Other community support:  Phone number:

## 2017-07-26 NOTE — PROGRESS NOTES
SUBJECTIVE:                                                    Holly Muir is a 62 year old female who presents to clinic today for the following health issues:      Medication Followup of all meds, also wart? On left ring finger.   Recent Fx of right wrist. Seeing ortho, cast today.     Taking Medication as prescribed: yes    Side Effects:  None    Medication Helping Symptoms:  yes       Holly has Severe major depression with psychotic features (H); History of cervical cancer; S/P colostomy (H); Memory loss; Type 2 diabetes mellitus, uncontrolled A1C goal <8%; HTN, goal below 140/90; and Encounter for long-term (current) use of insulin (H) on her pertinent problem list.     Patient has been seeing psychiatrist. Stable. On Abilify 20 mg QD, Wellbutrin 450 mg QD, paroxetine 60 mg qd, and buspar 30 mg BID. I am prescribing the Buspar, the rest from Dr. Isael Jiménez at [facility]. Reports she is still having visual hallucinations but not as bad as before, knows now they are hallucinations and has learned to ignore them.     Seeing endocrine for diabetes, improving.     Reports neuropathy much improved from gabapentin, but night time, still has some issues with pain and some times leg jerking.     Left ring finger has a lump for months. Picking at it.     RHM: due for colon cancer screening.       Current Outpatient Prescriptions on File Prior to Visit:  [facility] ASPIRIN ADULT LOW DOSE 81 MG chewable tablet TAKE 1 TABLET (81 MG) BY MOUTH DAILY OTC- NOT COVERED BY INS   pioglitazone (ACTOS) 30 MG tablet Take 1 tablet (30 mg) by mouth daily   insulin glargine (LANTUS SOLOSTAR) 100 UNIT/ML injection Inject 30 Units Subcutaneous At Bedtime (Patient taking differently: Inject 38 Units Subcutaneous At Bedtime )   BusPIRone HCl 30 MG TABS TAKE 1 TABLET BY MOUTH TWICE A DAY   insulin aspart (NOVOLOG FLEXPEN) 100 UNIT/ML injection Take as directed with meals. Total daily dose approx 30 units.   simvastatin (ZOCOR) 20  MG tablet TAKE 1 TABLET (20 MG) BY MOUTH AT BEDTIME   metFORMIN (GLUCOPHAGE) 1000 MG tablet TAKE 1 TABLET BY MOUTH 2 TIMES DAILY WITH MEALS   propranolol (INDERAL LA) 60 MG capsule Take 1 capsule (60 mg) by mouth daily May take a second dose if tremor recurs.   enalapril (VASOTEC) 2.5 MG tablet TAKE 1 TABLET BY MOUTH EVERY DAY   omeprazole (PRILOSEC) 40 MG capsule Take 1 capsule (40 mg) by mouth daily Take 30-60 minutes before a meal. (Patient taking differently: Take 40 mg by mouth daily Take 2 tabs dailyTake 30-60 minutes before a meal.)   nystatin (MYCOSTATIN) 393029 UNIT/GM POWD Apply 1 dose topically 3 times daily as needed   B Complex Vitamins (B-COMPLEX/B-12 PO) Take 1 tablet by mouth daily.    MULTI-VITAMIN OR TABS 1 TABLET DAILY   blood glucose monitoring (ACCU-CHEK FASTCLIX) lancets Use to test blood sugar 8 times daily or as directed.   traMADol (ULTRAM) 50 MG tablet Take 1-2 tablets ( mg) by mouth daily as needed for moderate pain   blood glucose monitoring (ACCU-CHEK CELIA) test strip Use to test blood sugars 8 times daily or as directed.   ORDER FOR DME Equipment being ordered:  Ostomy supplies.  Drain Pouch by [employer] #.    Also needs Barrier by [place] #.   insulin pen needle needle 3 Box See Admin Instructions Use four times a day times a day   Cholecalciferol (VITAMIN D) 1000 UNITS capsule Take 2,000 to 4,000 Units per day   ORDER FOR DME Equipment being ordered: Cane   ibuprofen (ADVIL,MOTRIN) 200 MG tablet Take 3-4 tablets by mouth every 4 hours as needed Reported on 2/24/2017   UNIVERSAL REMOVER WIPES EX MISC uses to remove barrier from colostomy bag at time of change   SKIN PREP WIPES MISC to be used with colostomy care     No current facility-administered medications on file prior to visit.       Problem list, Medication list, Allergies, and Medical/Social/Surgical histories reviewed in [facility] and updated as appropriate.    OBJECTIVE:                                            "         /80  Pulse 78  Temp 97  F (36.1  C) (Temporal)  Ht 5' 2\" (1.575 m)  Wt 187 lb (84.8 kg)  SpO2 99%  BMI 34.2 kg/m2    GEN: healthy, alert and no distress    Left ring finger with a raised cauliflower-like mass, consistent with wart        Diagnostic test results:  No results found for this or any previous visit (from the past 24 hour(s)).       ASSESSMENT/PLAN:                                                      62 year old female with the following diagnoses and treatment plan:      ICD-10-CM    1. Diabetic polyneuropathy associated with type 2 diabetes mellitus (H) E11.42 gabapentin (NEURONTIN) 300 MG capsule   2. Visual hallucination R44.1 ARIPiprazole (ABILIFY) 20 MG tablet     buPROPion (WELLBUTRIN XL) 150 MG 24 hr tablet   3. Severe recurrent major depressive disorder with psychotic features (H) F33.3 ARIPiprazole (ABILIFY) 20 MG tablet     buPROPion (WELLBUTRIN XL) 150 MG 24 hr tablet   4. Severe major depression with psychotic features (H) F32.3 PARoxetine (PAXIL) 20 MG tablet     DEPRESSION ACTION PLAN (DAP)   5. Encounter for screening mammogram for breast cancer Z12.31 CANCELED: [facility] Screening Digital Bilateral   6. Special screening for malignant neoplasms, colon Z12.11 Fecal colorectal cancer screen (FIT)       -- diabetes neuropathy: increase evening gabapentin dose  -- updated her medicaitons from psychiatrist.  -- wart on finger: treated with liquid nitrogen freezing freeze/thaw x 3.   -- may return for repeat freezing in 3 weeks for wart  -- return in 6 months for wellness visit.     Will call or return to clinic if worsening or symptoms not improving as discussed.  See Patient Instructions.    A total of 25 minutes was spent face to face with this patient. More than 50% of the time was spent on education for the above problems and management plans.     Tiffany King MD-PhD  [facility]    (Note: Chart documentation was done in part with Dragon Voice Recognition " software. Although reviewed after completion, some word and grammatical errors may remain.)

## 2017-07-26 NOTE — PATIENT INSTRUCTIONS
Make appointment(s) for:   -- wellness visit in 6 months  -- mammogram  -- Stop by lab to get testing kit for colon cancer screening.     You may schedule repeat freezing for the wart if not gone in 3 weeks.       Medication(s) prescribed today:    Orders Placed This Encounter   Medications     gabapentin (NEURONTIN) 300 MG capsule     Si capsule twice a day during the day and 2 capsules at night time     Dispense:  360 capsule     Refill:  1           Wart freezing:  The treated area is likely to blister within a few hours. Sometimes the blister is clear and sometimes it is red or purple because of bleeding (this is harmless). Treatment near the eye may result in a puffy eyelid, especially the following morning, but the swelling settles within a few days. Within a few days a scab forms and the blister gradually dries up.   Usually no special attention is needed during the healing phase. The treated area may be gently washed once or twice daily, and should be kept clean. A dressing is optional, but is advisable if the affected area is subject to trauma or clothes rub on it.   When the blister dries to a scab, apply petroleum jelly (Vaseline) and avoid picking at it. The scab peels off after 5-10 days on the face and 3 weeks on the hand. A sore or scab may persist as long as 3 months on the lower leg because healing in this site is often slow.   Secondary infection is uncommon. When it occurs it may cause increased pain, swelling, thick yellow blister fluid, a purulent discharge and/or redness around the treated area. Consult your doctor if you are concerned: topical antiseptics and/or oral antibiotics may be necessary.

## 2017-07-26 NOTE — NURSING NOTE
"Chief Complaint   Patient presents with     Recheck Medication       Initial /80  Pulse 78  Temp 97  F (36.1  C) (Temporal)  Ht 5' 2\" (1.575 m)  Wt 187 lb (84.8 kg)  SpO2 99%  BMI 34.2 kg/m2 Estimated body mass index is 34.2 kg/(m^2) as calculated from the following:    Height as of this encounter: 5' 2\" (1.575 m).    Weight as of this encounter: 187 lb (84.8 kg).  Medication Reconciliation: complete    "

## 2017-08-02 ENCOUNTER — RADIANT APPOINTMENT (OUTPATIENT)
Dept: MAMMOGRAPHY | Facility: CLINIC | Age: 62
End: 2017-08-02
Attending: INTERNAL MEDICINE
Payer: MEDICARE

## 2017-08-02 DIAGNOSIS — Z12.31 ENCOUNTER FOR SCREENING MAMMOGRAM FOR BREAST CANCER: ICD-10-CM

## 2017-08-02 PROCEDURE — 77063 BREAST TOMOSYNTHESIS BI: CPT | Performed by: RADIOLOGY

## 2017-08-02 PROCEDURE — G0202 SCR MAMMO BI INCL CAD: HCPCS | Performed by: RADIOLOGY

## 2017-08-03 ENCOUNTER — TELEPHONE (OUTPATIENT)
Dept: PEDIATRICS | Facility: CLINIC | Age: 62
End: 2017-08-03

## 2017-08-03 NOTE — TELEPHONE ENCOUNTER
Care Coordination Contact - Follow-Up - 8-3-17    SW attempted to contact pt to follow up regarding support/resources pt is currently receiving and identify any unmet needs.  SW was unable to connect with pt on either her home or cell phones and neither number would allow SW to leave a message.  SW will continue to attempt to reach pt regarding above.    ALMA Myrick     Social Work  [facility]  Office:  [phone]  E-Mail:  [email or web address]  8/3/2017 2:14 PM

## 2017-08-16 ENCOUNTER — OFFICE VISIT (OUTPATIENT)
Dept: ORTHOPEDICS | Facility: CLINIC | Age: 62
End: 2017-08-16
Payer: MEDICARE

## 2017-08-16 VITALS
BODY MASS INDEX: 34.41 KG/M2 | DIASTOLIC BLOOD PRESSURE: 82 MMHG | HEIGHT: 62 IN | SYSTOLIC BLOOD PRESSURE: 121 MMHG | WEIGHT: 187 LBS

## 2017-08-16 DIAGNOSIS — S52.571D OTHER CLOSED INTRA-ARTICULAR FRACTURE OF DISTAL END OF RIGHT RADIUS WITH ROUTINE HEALING, SUBSEQUENT ENCOUNTER: Primary | ICD-10-CM

## 2017-08-16 PROCEDURE — 99213 OFFICE O/P EST LOW 20 MIN: CPT | Performed by: FAMILY MEDICINE

## 2017-08-16 NOTE — PROGRESS NOTES
Holly Muir  :  1955  DOS: 2017  MRN: [id number]    Sports Medicine Clinic Visit    PCP: Tiffany King    Holly Muir is a 62 year old Right hand dominant female who is seen in consultation at the request of Emily Loving PA-C presenting with right wrist/hand injury.    Injury: about 1.5 weeks ago Holly fell out of her chair while adjusting it and had a FOOSH injury.  She was seen in  where her XR appeared negative at that time and she was given a wrist splint which was comfortable and she has used intermittently.  There is pain when she removes it for cleaning, washing dishes, showering.  She has no previous injury to this wrist.  It remains swollen.  She denies weakness or paresthesias.  She has notused a sling.  Has used tramadol and tylenol for pain relief, along with ice on occasion.      Social History: unemployed    Interim History 2017  Holly Muir is now 2 weeks out from injury.  Since last visit on 2017 patient continues to have mild discomfort in splint.  Presents today for cast placement, as discussed on phone with Dr Molina on .       Interim History 2017  Holly Muir is now 5 weeks out from injury.  Since last visit on 2017 patient denies swelling, pain or paresthesias, splint removed and repeat radiograph taken prior to seeing the patient.  Mild tenderness noted over fracture site.  No pain with axial loading.  Patient reports removing Exos periodically for hygiene with minimal discomfort.       Review of Systems  Musculoskeletal: as above  Remainder of review of systems is negative including constitutional, CV, pulmonary, GI, Skin and Neurologic except as noted in HPI or medical history.    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     Past Surgical History:   Procedure Laterality Date     ARTHROSCOPY KNEE RT/LT      LT     BREAST LUMPECTOMY, RT/LT      LT-benign      C EXCIS PTERYGIUM  10/08    [facility]     C STOMACH SURGERY PROCEDURE UNLISTED       COLONOSCOPY  5/10/2012    Procedure:COLONOSCOPY; screening colonoscopy; Surgeon:MILLA VEGA; Location:[facility] OR     COLOSTOMY  2000     COLOSTOMY       HC COLONOSCOPY THRU STOMA, DIAGNOSTIC  2002    normal per report-no records available-records destroyed     HYSTERECTOMY, PAP NO LONGER INDICATED      done in [place]-cervical cancer     IMPLANT STIMULATOR SACRAL NERVE STAGE ONE Right 3/10/2015    Procedure: IMPLANT STIMULATOR SACRAL NERVE STAGE ONE;  Surgeon: Teodora Yusuf MD;  Location: [facility] OR     IMPLANT STIMULATOR SACRAL NERVE STAGE TWO Right 3/31/2015    Procedure: IMPLANT STIMULATOR SACRAL NERVE STAGE TWO;  Surgeon: Teodora Yusuf MD;  Location: [facility] OR     KNEE SURGERY       SALPINGO OOPHORECTOMY,R/L/JENNIFER      Salpingo Oophorectomy, RT/LT/JENNIFER       Objective  /82  Ht 5' 2\" (1.575 m)  Wt 187 lb (84.8 kg)  BMI 34.2 kg/m2    General: healthy, alert and in no distress    HEENT: no scleral icterus or conjunctival erythema   Skin: no suspicious lesions or rash. No jaundice.   CV: regular rhythm by palpation, 2+ distal pulses, no pedal edema    Resp: normal respiratory effort without conversational dyspnea   Psych: normal mood and affect    Gait: nonantalgic, appropriate coordination and balance   Neuro: normal light touch sensory exam of the extremities. Motor strength as noted below     Right Wrist and Hand exam    Inspection:       Swelling: distal wrist, miminal    Tender:       distal radius right, very mild    Non Tender:       Remainder of the Wrist and Hand right,      anatomic snuffbox right,      scapholunate interval right and      TFCC " right    ROM:       Improving active and passive ROM of the Wrist with flexion, extension, radial deviation, ulnar deviation and tested carefully to ensure good motor function but full testing deferred due to fracture     Strength:       Deferred full testing but all motor function intact    Neurovascular:       2+ radial pulses bilaterally with brisk capillary refill and      normal sensation to light touch in the radial, median and ulnar nerve distributions    Right Elbow exam:  Full strength and ROM without pain, no bony TTP  No instability, laxity or pain with valgus or varus stress  No shoulder or neck pain, full ROM      Radiology:  Recent Results (from the past 744 hour(s))   XR Wrist Right G/E 3 Views    Narrative    RIGHT WRIST THREE OR MORE VIEWS  7/12/2017 7:06 PM     HISTORY: Unspecified injury of right wrist, hand and finger(s),  initial encounter.    COMPARISON: None.      Impression    IMPRESSION: Bones are normally aligned. Radiocarpal joint space is  diminished. No acute fracture.    SRINIVASA DORSEY MD   XR Wrist Right G/E 3 Views    Narrative    RIGHT WRIST THREE VIEWS July 21, 2017 4:36 PM     HISTORY: Carpometacarpal joint versus snuff box pain following trauma.    COMPARISON: 7/12/2017.      Impression    IMPRESSION: There is nondisplaced fracture of the distal radius seen  as radiolucency in the endosteal bone and a small cortical wrinkle  dorsally on the lateral view. The carpus is intact. The fracture is  not visible even in retrospect on the previous exam.    CHADD CUMMINS MD       Assessment:  1. Other closed intra-articular fracture of distal end of right radius with routine healing, subsequent encounter        Plan:  Discussed the assessment with the patient.  Follow up: 3 weeks  Improving swelling, switch to wrist brace which is comfortable, and wean as tolerated for low impact/safe ADLs  Exos ok as well if needed, limits pronation/supination pain   XR images independently visualized  and reviewed with patient again today in clinic  Intraarticular distal radius fracture, plan to repeat imaging next visit  Expect likely 6 weeks of immobilization total, on track for this, will follow clinically  Potential for some ongoing pain given intraarticular reviewed again today  Home handouts provided and supportive care reviewed  All questions were answered today  Contact us with additional questions or concerns  Signs and sx of concern reviewed      Christopher Molina DO, GARY  Primary Care Sports Medicine  [facility]             Disclaimer: This note consists of symbols derived from keyboarding, dictation and/or voice recognition software. As a result, there may be errors in the script that have gone undetected. Please consider this when interpreting information found in this chart.

## 2017-08-16 NOTE — MR AVS SNAPSHOT
After Visit Summary   8/16/2017    Holly Muir    MRN: [id number]           Patient Information     Date Of Birth          1955        Visit Information        Provider Department      8/16/2017 10:20 AM Christopher Molina DO [facility] [place]        Today's Diagnoses     Other closed intra-articular fracture of distal end of right radius with routine healing, subsequent encounter    -  1       Follow-ups after your visit        Your next 10 appointments already scheduled     Sep 01, 2017  9:45 AM CDT   Return Visit with Elizabeth Medrano MD, MG ENDO NURSE   [facility]    [address] [place] [address]   [phone]            Sep 06, 2017 10:00 AM CDT   Return Visit with Christopher Molina DO   [facility] [place] ([facility] [place])    [address] [facility] [address]  [place] [address]   [phone]            Jan 26, 2018 10:30 AM CST   LAB with LAB FIRST FLOOR [facility])    [address] [place] [id number] [phone]           Patient must bring picture ID. Patient should be prepared to give a urine specimen  Please do not eat 10-12 hours before your appointment if you are coming in fasting for labs on lipids, cholesterol, or glucose (sugar). Pregnant women should follow their Care Team instructions. Water with medications is okay. Do not drink coffee or other fluids. If you have concerns about taking  your medications, please ask at office or if scheduling via [employer], send a message by clicking on Secure Messaging, Message Your Care Team.            Jan 26, 2018 10:50 AM CST   PHYSICAL with Tiffany King MD   [facility]    [address] [place] [id number] [phone]              Who to contact     If you have  "questions or need follow up information about today's clinic visit or your schedule please contact [facility] directly at [phone].  Normal or non-critical lab and imaging results will be communicated to you by MyChart, letter or phone within 4 business days after the clinic has received the results. If you do not hear from us within 7 days, please contact the clinic through [employer] or phone. If you have a critical or abnormal lab result, we will notify you by phone as soon as possible.  Submit refill requests through [employer] or call your pharmacy and they will forward the refill request to us. Please allow 3 business days for your refill to be completed.          Additional Information About Your Visit        [employer] Information     [employer] lets you send messages to your doctor, view your test results, renew your prescriptions, schedule appointments and more. To sign up, go to [email or web address] . Click on \"Log in\" on the left side of the screen, which will take you to the Welcome page. Then click on \"Sign up Now\" on the right side of the page.     You will be asked to enter the access code listed below, as well as some personal information. Please follow the directions to create your username and password.     Your access code is: [email or web address]  Expires: 2017 10:14 AM     Your access code will  in 90 days. If you need help or a new code, please call your [facility] clinic or [phone].        Care EveryWhere ID     This is your Care EveryWhere ID. This could be used by other organizations to access your [facility] medical records  [id number]        Your Vitals Were     Height BMI (Body Mass Index)                5' 2\" (1.575 m) 34.2 kg/m2           Blood Pressure from Last 3 Encounters:   17 121/82   17 128/78   17 130/80    Weight from Last 3 Encounters:   17 187 lb (84.8 kg)   17 187 lb (84.8 kg)   17 187 lb (84.8 kg)         "      Today, you had the following     No orders found for display         Today's Medication Changes          These changes are accurate as of: 8/16/17  1:20 PM.  If you have any questions, ask your nurse or doctor.               These medicines have changed or have updated prescriptions.        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   Quantity:  90 capsule   Refills:  3                Primary Care Provider Office Phone # Fax #    Tiffany King [phone] [id number] [address] [facility] [address]        Goals        General    I will continue to monitor my blood sugars and treat the results as directed. (pt-stated)     Notes - Note created  12/9/2015 10:26 AM by Randy Suarez RN    As of today's date 12/9/2015 goal is met at 0 - 25%.   Goal Status:  Active          Equal Access to Services     KATRIN RIGGS AH: Hadii dharmesh persaud Soila, waaxda luqadaha, qaybta kaalmada adeegyada, kim uriostegui. So [facility] [phone].    ATENCIÓN: Si habla español, tiene a lopez disposición servicios gratuitos de asistencia lingüística. Llame al [phone].    We comply with applicable federal civil rights laws and [place] laws. We do not discriminate on the basis of race, color, national origin, age, disability sex, sexual orientation or gender identity.            Thank  you!     Thank you for choosing [facility]  for your care. Our goal is always to provide you with excellent care. Hearing back from our patients is one way we can continue to improve our services. Please take a few minutes to complete the written survey that you may receive in the mail after your visit with us. Thank you!             Your Updated Medication List - Protect others around you: Learn how to safely use, store and throw away your medicines at www.disposemymeds.org.          This list is accurate as of: 8/16/17  1:20 PM.  Always use your most recent med list.                   Brand Name Dispense Instructions for use Diagnosis    ARIPiprazole 20 MG tablet    ABILIFY     Take 1 tablet (20 mg) by mouth daily Prescribed by Dr. Isael Jim[facility]nez at [facility]    Visual hallucination, Severe recurrent major depressive disorder with psychotic features (H)       B-COMPLEX/B-12 PO      Take 1 tablet by mouth daily.        blood glucose monitoring lancets     9 Box    Use to test blood sugar 8 times daily or as directed.    Type 2 diabetes mellitus, uncontrolled (H)       blood glucose monitoring test strip    ACCU-CHEK CELIA    250 each    Use to test blood sugars 8 times daily or as directed.    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)       BusPIRone HCl 30 MG Tabs     60 tablet    TAKE 1 TABLET BY MOUTH TWICE A DAY    Anxiety       [facility] ASPIRIN ADULT LOW DOSE 81 MG chewable tablet   Generic drug:  aspirin     90 tablet    TAKE 1 TABLET (81 MG) BY MOUTH DAILY ***OTC- NOT COVERED BY INS***    Uncontrolled diabetes mellitus (H)       enalapril 2.5 MG tablet    VASOTEC    90 tablet    TAKE 1 TABLET BY MOUTH EVERY DAY    Diabetes mellitus with background retinopathy (H)        gabapentin 300 MG capsule    NEURONTIN    360 capsule    1 capsule twice a day during the day and 2 capsules at night time    Diabetic polyneuropathy associated with type 2 diabetes mellitus (H)       ibuprofen 200 MG tablet    ADVIL/MOTRIN     Take 3-4 tablets by mouth every 4 hours as needed Reported on 2/24/2017        insulin aspart 100 UNIT/ML injection    NovoLOG FLEXPEN    30 mL    Take as directed with meals. Total daily dose approx 30 units.    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, with long-term current use of insulin (H)       insulin pen needle 31G X 5 MM     3 Box    3 Box See Admin Instructions Use four times a day times a day    Type 2 diabetes mellitus, uncontrolled (H)       metFORMIN 1000 MG tablet    GLUCOPHAGE    60 tablet    TAKE 1 TABLET BY MOUTH 2 TIMES DAILY WITH MEALS    Uncontrolled type 2 diabetes mellitus with hyperglycemia, with long-term current use of insulin (H)       Multi-vitamin Tabs tablet   Generic drug:  multivitamin, therapeutic with minerals      1 TABLET DAILY        nystatin 333428 UNIT/GM Powd    MYCOSTATIN    60 g    Apply 1 dose topically 3 times daily as needed    Skin rash       omeprazole 40 MG capsule    priLOSEC    90 capsule    Take 1 capsule (40 mg) by mouth daily Take 30-60 minutes before a meal.    Gastroesophageal reflux disease without esophagitis       order for DME     1 Units    Equipment being ordered: Cane    Knee osteoarthritis       order for DME     3 Month    Equipment being ordered:  Ostomy supplies.  Drain Pouch by [employer] #.    Also needs Barrier by [place] #.    S/P colostomy (H)       PARoxetine 20 MG tablet    PAXIL     TAKE 3 TABLETS AT BEDTIME (Prescribed by [facility] Isael [facility] at [facility])    Severe major depression with psychotic features (H)       pioglitazone 30 MG tablet    ACTOS    90 tablet    Take 1 tablet (30 mg) by mouth daily    Uncontrolled type 2 diabetes mellitus with hyperglycemia, with long-term current use of insulin (H), Insulin resistance       propranolol 60 MG 24 hr capsule    INDERAL LA    60 capsule    Take 1 capsule (60 mg) by mouth daily May take a second dose if tremor recurs.    Tremor       simvastatin 20 MG tablet    ZOCOR    90 tablet    TAKE 1 TABLET (20 MG) BY MOUTH AT BEDTIME    High cholesterol       SKIN PREP WIPES Misc     1 Bottle    to be used with colostomy care    Colostomy       traMADol 50 MG tablet    ULTRAM    60 tablet    Take 1-2 tablets ( mg) by mouth daily as needed for moderate pain    Arthritis of knee, Arthralgia of both knees       Universal Remover Wipes Misc     1 Each    uses to remove barrier from colostomy bag at time of change    Colostomy       vitamin D 1000 UNITS capsule     90 capsule    Take 2,000 to 4,000 Units per day

## 2017-08-16 NOTE — NURSING NOTE
"Chief Complaint   Patient presents with     Fracture Followup     f/u right wrist fracture > 5 weeks post-injury       Initial /82  Ht 5' 2\" (1.575 m)  Wt 187 lb (84.8 kg)  BMI 34.2 kg/m2 Estimated body mass index is 34.2 kg/(m^2) as calculated from the following:    Height as of this encounter: 5' 2\" (1.575 m).    Weight as of this encounter: 187 lb (84.8 kg).  Medication Reconciliation: complete     Marino Ervin ATC  "

## 2017-08-22 ENCOUNTER — TRANSFERRED RECORDS (OUTPATIENT)
Dept: HEALTH INFORMATION MANAGEMENT | Facility: CLINIC | Age: 62
End: 2017-08-22

## 2017-08-22 LAB — PHQ9 SCORE: 6

## 2017-08-23 ENCOUNTER — DOCUMENTATION ONLY (OUTPATIENT)
Dept: CARE COORDINATION | Facility: CLINIC | Age: 62
End: 2017-08-23

## 2017-08-23 DIAGNOSIS — E11.3299 DIABETES MELLITUS WITH BACKGROUND RETINOPATHY (H): ICD-10-CM

## 2017-08-23 NOTE — PROGRESS NOTES
[facility] utilizes an encounter to take the place of a direct phone call to your office. Please take a moment to review the below request. Your reply to this encounter will act as a verbal OK of orders if requested below. Thank you.    ORDER  Requesting homecare recertification orders    MD SUMMARY/PLAN OF CARE     Recert  Pt is a 62 yr   old female continue care with [facility] Services for Disease Management/Education and assistance with Medications.  She currently resides in a single family home with her daughter/son in law and grandchildren. Family assist as needed, client   takes medical transport to all appointments, she has been bringing after visit summary from appointments to nurse for medicaiton reconciliation and care coordination.     Primary medical diagnosis is Severe major depression with psychotic features, pt also   suffers from occasional memory loss and is Type 2 diabetic.  Pt has ostomy which she manages independently, pt also has implant  which has resulted in her remaining continent of urine. She reports chronic back pain which at worst she rates 7/10 and best 0/10 over   the last 60 days since adding tramadol regularly and massage, Currently pt pain is rated at a 3, she states it is a chronic aching feeling.  Teaching on proper pain mgmt with the use of Tramadol has been provided, pain is improved with consistent use of pain   medications. She is alert and orientated x 3 with forgetfulness, speech is clear, she is pleasant and cooperative with in home nurse visits.  She has been taking medications as set up by nurse, with an occasional missed dose, this has improved   significantly with homecare involvement.   She occasionally goes out of town with frineds and has had improved med compliance during her outings with the teaching provided on importance of taking all medications as set by nurse.  Pt is following Psych doctor at [facility] and  [facility]. Pt is also seeing a therapist  and pt   states this has helped with  depression.   Education on ADA diet and carb counting, pt intermittently provides a food diary,  she has not been documenting her blood glucose readings because they are recorded in her blood glucose monitor.  Communication with   endocrinology education regarding elevated blood sugars, client was advised to take her lantus with evening medications because she had missed lantus at HS, this has been easier for client to be achieve.    Pt will need ongoing education and assessment   for compliance. Unable to check blood sugar today due to dead batteris in glucometer.   VSS, LSC, no new concerns verbalized by patient  BACKGROUND// Pt with     Severe major depression, memory loss and poorly controlled diabetes requires   education/assistance with managing medication regimen.  Pt and family require education/assistance with ADA diet and skin integrity/assessments.  ANALYSIS// High risk for     hospitalization r/t poorly controlled diabetes and history of   noncompliance/forgetfulness with taking high risk medications correctly.  She will require skilled interventions to manage her health at home.    RECOMMENDATION// Recommend SN visits for     Medication management/education, pt/CG education on ADA diet,   skin integrity assessments/teaching.  Chronic pain management/education.  Expected length of home care is  ongoing HC need for med mgmt    EVELYN Orellana  [phone]  [email or web address]

## 2017-08-24 RX ORDER — ENALAPRIL MALEATE 2.5 MG/1
TABLET ORAL
Qty: 90 TABLET | Refills: 0 | Status: SHIPPED | OUTPATIENT
Start: 2017-08-24 | End: 2017-11-24

## 2017-08-24 NOTE — TELEPHONE ENCOUNTER
enalapril (VASOTEC) 2.5 MG tablet  Last Written Prescription Date: 9/12/2016  Last Fill Quantity: 90, # refills: 2  Last Office Visit with [facility], P or [facility] prescribing provider: 7/26/2016  Next 5 appointments (look out 90 days)     Sep 01, 2017  9:45 AM CDT   Return Visit with Elizabeth Medrano MD, MG ENDO NURSE   [facility]    [address] [place] [address] [phone]            Sep 06, 2017 10:00 AM CDT   Return Visit with Christopher Molina,    [facility] [place] ([facility] [place])    [address] [facility] [address]  [place] [id number] [phone]                   Potassium   Date Value Ref Range Status   12/19/2016 4.3 3.4 - 5.3 mmol/L Final     Creatinine   Date Value Ref Range Status   12/19/2016 0.88 0.52 - 1.04 mg/dL Final     BP Readings from Last 3 Encounters:   08/16/17 121/82   07/26/17 128/78   07/26/17 130/80     Refilled per [facility] protocol.    Helena Dickson RN

## 2017-08-29 DIAGNOSIS — K21.9 GASTROESOPHAGEAL REFLUX DISEASE WITHOUT ESOPHAGITIS: ICD-10-CM

## 2017-08-30 PROCEDURE — G0179 MD RECERTIFICATION HHA PT: HCPCS | Performed by: INTERNAL MEDICINE

## 2017-08-31 NOTE — TELEPHONE ENCOUNTER
omeprazole (PRILOSEC) 20 MG CR capsule    Last Written Prescription Date: 5/25/2016  Last Fill Quantity: 90,  # refills: 3   Last Office Visit with [facility], P or [facility] prescribing provider: 7/26/2017                                         Next 5 appointments (look out 90 days)     Sep 01, 2017  9:45 AM CDT   Return Visit with Elizabeth Medrano MD, MG ENDO NURSE   [facility] ([facility]    [address] [place] [address]   [phone]            Sep 06, 2017 10:00 AM CDT   Return Visit with Christopher Molina DO   [facility] [place] ([facility] [place])    [address] [facility] [address]  [place] [address] [phone]                  Refilled per [facility] protocol.    Helena Dickson RN

## 2017-09-06 ENCOUNTER — OFFICE VISIT (OUTPATIENT)
Dept: ORTHOPEDICS | Facility: CLINIC | Age: 62
End: 2017-09-06
Payer: MEDICARE

## 2017-09-06 VITALS
DIASTOLIC BLOOD PRESSURE: 80 MMHG | SYSTOLIC BLOOD PRESSURE: 118 MMHG | BODY MASS INDEX: 34.41 KG/M2 | HEIGHT: 62 IN | WEIGHT: 187 LBS

## 2017-09-06 DIAGNOSIS — S69.91XD INJURY OF RIGHT WRIST, SUBSEQUENT ENCOUNTER: ICD-10-CM

## 2017-09-06 DIAGNOSIS — S52.571D OTHER CLOSED INTRA-ARTICULAR FRACTURE OF DISTAL END OF RIGHT RADIUS WITH ROUTINE HEALING, SUBSEQUENT ENCOUNTER: Primary | ICD-10-CM

## 2017-09-06 PROCEDURE — 99213 OFFICE O/P EST LOW 20 MIN: CPT | Performed by: FAMILY MEDICINE

## 2017-09-06 NOTE — MR AVS SNAPSHOT
After Visit Summary   9/6/2017    Holly Muir    MRN: [id number]           Patient Information     Date Of Birth          1955        Visit Information        Provider Department      9/6/2017 10:00 AM Christopher Molina DO [facility]        Today's Diagnoses     Other closed intra-articular fracture of distal end of right radius with routine healing, subsequent encounter    -  1    Injury of right wrist, subsequent encounter           Follow-ups after your visit        Your next 10 appointments already scheduled     Jan 26, 2018 10:30 AM CST   LAB with LAB FIRST FLOOR [facility] ([facility])    [id number] [place] [id number] [phone]           Patient must bring picture ID. Patient should be prepared to give a urine specimen  Please do not eat 10-12 hours before your appointment if you are coming in fasting for labs on lipids, cholesterol, or glucose (sugar). Pregnant women should follow their Care Team instructions. Water with medications is okay. Do not drink coffee or other fluids. If you have concerns about taking  your medications, please ask at office or if scheduling via [employer], send a message by clicking on Secure Messaging, Message Your Care Team.            Jan 26, 2018 10:50 AM CST   PHYSICAL with Tiffany King MD   [facility] ([facility])    [id number] [place] [id number] [phone]              Who to contact     If you have questions or need follow up information about today's clinic visit or your schedule please contact [facility] directly at [phone].  Normal or non-critical lab and imaging results will be communicated to you by MyChart, letter or phone within 4 business days after the clinic has received the results. If you do not hear from us within 7 days, please contact the clinic  "through [employer] or phone. If you have a critical or abnormal lab result, we will notify you by phone as soon as possible.  Submit refill requests through [employer] or call your pharmacy and they will forward the refill request to us. Please allow 3 business days for your refill to be completed.          Additional Information About Your Visit        [employer] Information     [employer] lets you send messages to your doctor, view your test results, renew your prescriptions, schedule appointments and more. To sign up, go to [email or web address] . Click on \"Log in\" on the left side of the screen, which will take you to the Welcome page. Then click on \"Sign up Now\" on the right side of the page.     You will be asked to enter the access code listed below, as well as some personal information. Please follow the directions to create your username and password.     Your access code is: [email or web address]  Expires: 2017 10:11 AM     Your access code will  in 90 days. If you need help or a new code, please call your [facility] clinic or [phone].        Care EveryWhere ID     This is your Care EveryWhere ID. This could be used by other organizations to access your [facility] medical records  [id number]        Your Vitals Were     Height BMI (Body Mass Index)                5' 2\" (1.575 m) 34.2 kg/m2           Blood Pressure from Last 3 Encounters:   17 118/80   17 121/82   17 128/78    Weight from Last 3 Encounters:   17 187 lb (84.8 kg)   17 187 lb (84.8 kg)   17 187 lb (84.8 kg)              Today, you had the following     No orders found for display         Today's Medication Changes          These changes are accurate as of: 17 10:11 AM.  If you have any questions, ask your nurse or doctor.               These medicines have changed or have updated prescriptions.        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 (pt-stated)     Notes - Note created  12/9/2015 10:26 AM by Randy Suarez RN    As of today's date 12/9/2015 goal is met at 0 - 25%.   Goal Status:  Active          Equal Access to Services     KATRIN RIGGS : Hadii aad ku hadasho Soila, waaxda luqadaha, qaybta kaalmada adeegyada, kim iqbal . So [facility] [phone].    ATENCIÓN: Si habla español, tiene a lopez disposición servicios gratuitos de asistencia lingüística. Ll[facility] [phone].    We comply with applicable federal civil rights laws and [place] laws. We do not discriminate on the basis of race, color, national origin, age, disability sex, sexual orientation or gender identity.            Thank you!     Thank you for choosing [facility]  for your care. Our goal is always to provide you with excellent care. Hearing back from our patients is one way we can continue to improve our services. Please take a few minutes to complete the written survey that you may receive in the mail after your visit with us. Thank you!             Your Updated Medication List - Protect others around you: Learn how to safely use, store and throw away your medicines at www.disposemymeds.org.          This list is accurate as of: 9/6/17 10:11 AM.  Always use your most recent med list.                   Brand Name Dispense Instructions for use Diagnosis    ARIPiprazole 20 MG tablet    ABILIFY      Take 1 tablet (20 mg) by mouth daily Prescribed by Dr. Isael Jim[facility]nez at [facility]    Visual hallucination, Severe recurrent major depressive disorder with psychotic features (H)       B-COMPLEX/B-12 PO      Take 1 tablet by mouth daily.        blood glucose monitoring lancets     9 Box    Use to test blood sugar 8 times daily or as directed.    Type 2 diabetes mellitus, uncontrolled (H)       blood glucose monitoring test strip    ACCU-CHEK CELIA    250 each    Use to test blood sugars 8 times daily or as directed.    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)       BusPIRone HCl 30 MG Tabs     60 tablet    TAKE 1 TABLET BY MOUTH TWICE A DAY    Anxiety       [facility] ASPIRIN ADULT LOW DOSE 81 MG chewable tablet   Generic drug:  aspirin     90 tablet    TAKE 1 TABLET (81 MG) BY MOUTH DAILY ***OTC- NOT COVERED BY INS***    Uncontrolled diabetes mellitus (H)       enalapril 2.5 MG tablet    VASOTEC    90 tablet    TAKE 1 TABLET BY MOUTH EVERY DAY    Diabetes mellitus with background retinopathy (H)       gabapentin 300 MG capsule    NEURONTIN    360 capsule    1 capsule twice a day during the day and 2 capsules at night time    Diabetic polyneuropathy associated with type 2 diabetes mellitus (H)       ibuprofen 200 MG tablet    ADVIL/MOTRIN     Take 3-4 tablets by mouth every 4 hours as needed Reported on 2/24/2017        insulin aspart 100 UNIT/ML injection    NovoLOG FLEXPEN    30 mL    Take as directed with meals. Total daily dose approx 30 units.    Type 2 diabetes mellitus, uncontrolled, with retinopathy (H)       insulin glargine 100 UNIT/ML injection    LANTUS SOLOSTAR    9 mL    Inject 30 Units Subcutaneous At Bedtime    Uncontrolled type 2 diabetes mellitus with mild nonproliferative retinopathy without  macular edema, with long-term current use of insulin (H)       insulin pen needle 31G X 5 MM     3 Box    3 Box See Admin Instructions Use four times a day times a day    Type 2 diabetes mellitus, uncontrolled (H)       metFORMIN 1000 MG tablet    GLUCOPHAGE    60 tablet    TAKE 1 TABLET BY MOUTH 2 TIMES DAILY WITH MEALS    Uncontrolled type 2 diabetes mellitus with hyperglycemia, with long-term current use of insulin (H)       Multi-vitamin Tabs tablet   Generic drug:  multivitamin, therapeutic with minerals      1 TABLET DAILY        nystatin 301370 UNIT/GM Powd    MYCOSTATIN    60 g    Apply 1 dose topically 3 times daily as needed    Skin rash       omeprazole 20 MG CR capsule    priLOSEC    180 capsule    TAKE 2 CAPSULES BY MOUTH DAILY TAKE 30-60 MINUTES BEFORE A MEAL.    Gastroesophageal reflux disease without esophagitis       order for DME     1 Units    Equipment being ordered: Cane    Knee osteoarthritis       order for DME     3 Month    Equipment being ordered:  Ostomy supplies.  Drain Pouch by "[employer] #.    Also needs Barrier by "[employer] #.    S/P colostomy (H)       PARoxetine 20 MG tablet    PAXIL     TAKE 3 TABLETS AT BEDTIME (Prescribed by Dr. Isael Jiménez at [facility])    Severe major depression with psychotic features (H)       pioglitazone 30 MG tablet    ACTOS    90 tablet    Take 1 tablet (30 mg) by mouth daily    Uncontrolled type 2 diabetes mellitus with hyperglycemia, with long-term current use of insulin (H), Insulin resistance       propranolol 60 MG 24 hr capsule    INDERAL LA    60 capsule    Take 1 capsule (60 mg) by mouth daily May take a second dose if tremor recurs.    Tremor       simvastatin 20 MG tablet    ZOCOR    90 tablet    TAKE 1 TABLET (20 MG) BY MOUTH AT BEDTIME    High cholesterol       SKIN PREP WIPES Misc     1 Bottle    to be used with colostomy care    Colostomy       traMADol 50 MG tablet    ULTRAM    60 tablet    Take 1-2 tablets ( mg)  by mouth daily as needed for moderate pain    Arthritis of knee, Arthralgia of both knees       Universal Remover Wipes Misc     1 Each    uses to remove barrier from colostomy bag at time of change    Colostomy       vitamin D 1000 UNITS capsule     90 capsule    Take 2,000 to 4,000 Units per day

## 2017-09-06 NOTE — NURSING NOTE
"Chief Complaint   Patient presents with     Fracture Followup     f/u right wrist fracture > 8 weeks       Initial /80  Ht 5' 2\" (1.575 m)  Wt 187 lb (84.8 kg)  BMI 34.2 kg/m2 Estimated body mass index is 34.2 kg/(m^2) as calculated from the following:    Height as of this encounter: 5' 2\" (1.575 m).    Weight as of this encounter: 187 lb (84.8 kg).  Medication Reconciliation: complete     Marino Ervin, ATC  "

## 2017-09-06 NOTE — PROGRESS NOTES
Holly Muir  :  1955  DOS: 2017  MRN: [id number]    Sports Medicine Clinic Visit    PCP: Tiffany King    Holly Muir is a 62 year old Right hand dominant female who is seen in consultation at the request of Emily Loving PA-C presenting with right wrist/hand injury.    Injury: about 1.5 weeks ago Holly fell out of her chair while adjusting it and had a FOOSH injury.  She was seen in  where her XR appeared negative at that time and she was given a wrist splint which was comfortable and she has used intermittently.  There is pain when she removes it for cleaning, washing dishes, showering.  She has no previous injury to this wrist.  It remains swollen.  She denies weakness or paresthesias.  She has notused a sling.  Has used tramadol and tylenol for pain relief, along with ice on occasion.      Social History: unemployed    Interim History 2017  Holly Muir is now 2 weeks out from injury.  Since last visit on 2017 patient continues to have mild discomfort in splint.  Presents today for cast placement, as discussed on phone with Dr Molina on .       Interim History 2017  Holly Muir is now 5 weeks out from injury.  Since last visit on 2017 patient denies swelling, pain or paresthesias, splint removed and repeat radiograph taken prior to seeing the patient.  Mild tenderness noted over fracture site.  No pain with axial loading.  Patient reports removing Exos periodically for hygiene with minimal discomfort.     Interim History 2017  Holly Muir is now 8 weeks out from injury.  Since last visit on 2017 patient denies swelling, pain or paresthesias and splint removed.  Mild tenderness over fracture site, she presents in Exos splint today, but states that she has been alternating with normal wrist brace.  Post immobilization stiffness noted.    Review of Systems  Musculoskeletal: as above  Remainder of review of systems is negative including  "constitutional, CV, pulmonary, GI, Skin and Neurologic except as noted in HPI or medical history.    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     Past Surgical History:   Procedure Laterality Date     ARTHROSCOPY KNEE RT/LT      LT     BREAST LUMPECTOMY, RT/LT      LT-benign      C EXCIS PTERYGIUM  10/08    [facility]     C STOMACH SURGERY PROCEDURE UNLISTED       COLONOSCOPY  5/10/2012    Procedure:COLONOSCOPY; screening colonoscopy; Surgeon:MILLA VEGA; Location:[facility] OR     COLOSTOMY  2000     COLOSTOMY       [facility] COLONOSCOPY THRU STOMA, DIAGNOSTIC  2002    normal per report-no records available-records destroyed     HYSTERECTOMY, PAP NO LONGER INDICATED      done in [place]-cervical cancer     IMPLANT STIMULATOR SACRAL NERVE STAGE ONE Right 3/10/2015    Procedure: IMPLANT STIMULATOR SACRAL NERVE STAGE ONE;  Surgeon: Teodora Yusuf MD;  Location: [facility] OR     IMPLANT STIMULATOR SACRAL NERVE STAGE TWO Right 3/31/2015    Procedure: IMPLANT STIMULATOR SACRAL NERVE STAGE TWO;  Surgeon: Teodora Yusuf MD;  Location: [facility] OR     KNEE SURGERY       SALPINGO OOPHORECTOMY,R/L/JENNIFER      Salpingo Oophorectomy, RT/LT/JENNIFER       Objective  /80  Ht 5' 2\" (1.575 m)  Wt 187 lb (84.8 kg)  BMI 34.2 kg/m2    General: healthy, alert and in no distress    HEENT: no scleral icterus or conjunctival erythema   Skin: no suspicious lesions or rash. No jaundice.   CV: regular rhythm by palpation, 2+ distal pulses, no pedal edema    Resp: normal respiratory effort without conversational dyspnea   Psych: normal mood and affect    Gait: nonantalgic, appropriate coordination and balance "   Neuro: normal light touch sensory exam of the extremities. Motor strength as noted below       Right Wrist and Hand exam    Inspection:       Swelling: distal wrist, miminal    Tender:       distal radius right, resolved TTP    Non Tender:       Remainder of the Wrist and Hand right,      anatomic snuffbox right,      scapholunate interval right and      TFCC right    ROM:       Stable active and passive ROM of the Wrist with flexion, extension, radial deviation, ulnar deviation, pain/stiffness with flexion and extension, improved after gentle stretching    Strength:       All motor function intact    Neurovascular:       2+ radial pulses bilaterally with brisk capillary refill and      normal sensation to light touch in the radial, median and ulnar nerve distributions      Radiology:  Recent Results (from the past 744 hour(s))   XR Wrist Right G/E 3 Views    Narrative    RIGHT WRIST THREE OR MORE VIEWS  7/12/2017 7:06 PM     HISTORY: Unspecified injury of right wrist, hand and finger(s),  initial encounter.    COMPARISON: None.      Impression    IMPRESSION: Bones are normally aligned. Radiocarpal joint space is  diminished. No acute fracture.    SRINIVASA DORSEY MD   XR Wrist Right G/E 3 Views    Narrative    RIGHT WRIST THREE VIEWS July 21, 2017 4:36 PM     HISTORY: Carpometacarpal joint versus snuff box pain following trauma.    COMPARISON: 7/12/2017.      Impression    IMPRESSION: There is nondisplaced fracture of the distal radius seen  as radiolucency in the endosteal bone and a small cortical wrinkle  dorsally on the lateral view. The carpus is intact. The fracture is  not visible even in retrospect on the previous exam.    CHADD CUMMINS MD       Assessment:  1. Other closed intra-articular fracture of distal end of right radius with routine healing, subsequent encounter    2. Injury of right wrist, subsequent encounter        Plan:  Discussed the assessment with the patient.  Follow up: 4 weeks, prn, call  with updates  Minimal swelling, transition from wrist brace, wean as tolerated for low impact/safe ADLs over next 1-2 weeks  Exos ok as well if needed, limits pronation/supination pain   XR images independently visualized and reviewed with patient again today in clinic  Intraarticular distal radius fracture, healing well clinically  HEP reviewed  OT available in future prn  Potential for some ongoing pain given intraarticular reviewed again today  Supportive care reviewed  All questions were answered today  Contact us with additional questions or concerns  Signs and sx of concern reviewed      Christopher Molina DO, GARY  Primary Care Sports Medicine  [facility]             Disclaimer: This note consists of symbols derived from keyboarding, dictation and/or voice recognition software. As a result, there may be errors in the script that have gone undetected. Please consider this when interpreting information found in this chart.

## 2017-09-07 NOTE — TELEPHONE ENCOUNTER
"Received refill fax request from [facility] pharmacy for patient's Novolog. Noted on refill \"patient is out\".    Last office visit with Dr. Medrano on 5/26/17. Patient no showed 9/1/17 office visit. Attempted to contact patient to schedule follow-up. Left voicemail for patient to contact our office.     Refill completed with note to call to schedule follow-up.       Anahy Bloom RN  Endocrine Care Coordinator  [facility]    "

## 2017-09-13 NOTE — TELEPHONE ENCOUNTER
Patient reports she was not completely out of insulin.     She is scheduled for 9/28 with Dr. Medrano.    Argelia Maciel LPN  Adult Endocrinology  [facility]

## 2017-09-20 DIAGNOSIS — E11.65 UNCONTROLLED TYPE 2 DIABETES MELLITUS WITH HYPERGLYCEMIA, WITH LONG-TERM CURRENT USE OF INSULIN (H): ICD-10-CM

## 2017-09-20 DIAGNOSIS — Z79.4 UNCONTROLLED TYPE 2 DIABETES MELLITUS WITH HYPERGLYCEMIA, WITH LONG-TERM CURRENT USE OF INSULIN (H): ICD-10-CM

## 2017-09-20 NOTE — LETTER
September 20, 2017      Holly Muir  [address] [address]              Dear Holly,      Thank you for your refill request. We recently received a call from your pharmacy requesting a refill of your medication. We have provided a 30 day refill of your medication, metFORMIN (GLUCOPHAGE) 1000 MG tablet, as requested.      However, our records show it is time for an A1C blood test in the lab with Dr. King to ensure your treatment is safe and effective for you.      Regular follow up, including visits with your health care provider and laboratory monitoring are necessary to make sure your medication is working properly.    You can reach us at [phone] or schedule online via [employer].    Thank you for taking an active role in your healthcare.    Sincerely,  Helena Dickson RN,   [facility]  Tiffany King MD

## 2017-09-20 NOTE — TELEPHONE ENCOUNTER
metFORMIN (GLUCOPHAGE) 1000 MG tablet    Last Written Prescription Date: 12/14/2017  Last Fill Quantity: 60, # refills: 5  Last Office Visit with [facility], [facility] or [facility] prescribing provider:  7/26/2017   Next 5 appointments (look out 90 days)     Sep 28, 2017  9:00 AM CDT   Return Visit with Elizabeth Medrano MD, [facility] END[facility] NURSE   [facility]    [address] [facility] [address] [phone]                   BP Readings from Last 3 Encounters:   09/06/17 118/80   08/16/17 121/82   07/26/17 128/78     Lab Results   Component Value Date    MICROL 18 12/19/2016     Lab Results   Component Value Date    UMALCR 47.15 12/19/2016     Creatinine   Date Value Ref Range Status   12/19/2016 0.88 0.52 - 1.04 mg/dL Final   ]  GFR Estimate   Date Value Ref Range Status   12/19/2016 66 >60 mL/min/1.7m2 Final   05/25/2016 74 >60 mL/min/1.7m2 Final     Comment:     Non  GFR Calc   03/31/2015 72 >60 mL/min/1.7m2 Final     Comment:     Non  GFR Calc     GFR Estimate If Black   Date Value Ref Range Status   12/19/2016 79 >60 mL/min/1.7m2 Final   05/25/2016 89 >60 mL/min/1.7m2 Final     Comment:      GFR Calc   03/31/2015 87 >60 mL/min/1.7m2 Final     Comment:      GFR Calc     Lab Results   Component Value Date    CHOL 134 12/19/2016     Lab Results   Component Value Date    HDL 58 12/19/2016     Lab Results   Component Value Date    LDL 21 12/19/2016     Lab Results   Component Value Date    TRIG 274 12/19/2016     Lab Results   Component Value Date    CHOLHDLRATIO 2.0 09/18/2015     Lab Results   Component Value Date    AST 29 12/19/2016     Lab Results   Component Value Date    ALT 58 12/19/2016     Lab Results   Component Value Date    A1C 12.0 05/26/2017    A1C 10.9 12/19/2016    A1C 10.8 05/25/2016    A1C 10.6 03/21/2016    A1C 10.0 11/09/2015     Potassium   Date Value Ref Range Status   12/19/2016 4.3 3.4 -  5.3 mmol/L Final     Patient due for A1c per protocol. 1 month supply. Patient notified by letter.     Helena Dickson RN

## 2017-09-27 ENCOUNTER — TELEPHONE (OUTPATIENT)
Dept: ENDOCRINOLOGY | Facility: CLINIC | Age: 62
End: 2017-09-27

## 2017-09-27 ENCOUNTER — TELEPHONE (OUTPATIENT)
Dept: ORTHOPEDICS | Facility: CLINIC | Age: 62
End: 2017-09-27

## 2017-09-27 NOTE — TELEPHONE ENCOUNTER
Spoke with pt. History is as follows: Bg yesterday; Pre- L = 433, Pre-D = 89, Bedtime = 200. This morning at 8:30am, bg = 504. Pt states she at breakfast at 8am. Bg at 9:40 = 510. Pt states she took 15 units Novolog at 9:15am. Current insulin doses: Lantus 42 units daily in the evening, Novolou with B, 9u with L and D. Novolog SS: Bg 200-250: +2units, 250-300: + 4units, 300-350 + 6units, > 350: +6units.     Pt advised to take 2 units Lantus now. NO nned for more Novolog at this time since took 15 units only 25 minutes ago. Increase lantus dose from 42 to 46 units beginning tonight. Increase Br dose from 6 to 7 units beginning jeremiah. Pt reminded of appt with Dr Marcela daniels. Pt states she plans to be here.     Also left pt's home health nurse a message regarding al the above.     Mikayla Sellers, RN, BSN, CDE   [facility]

## 2017-09-27 NOTE — TELEPHONE ENCOUNTER
Received a call from patient's home care nurse, Lesvai , stating that patient's blood sugar this morning was 504. Lesvia states that patient ate breakfast at about 8am and the 504 blood sugar was about 30 mins into eating at 8:30am.     Patient administered 15 units of Novolog and checked her blood sugar 20 mins later and it was 590. She washed her hands and retested and then blood sugar was 573, then 509.     Lesvia reports that yesterday patient's blood sugar before lunch at 2pm was 433. Patient administered 12 units of Novolog. Patient rechecked at 5:25pm and her blood sugar dropped to 89. Later that night at 8:37pm her blood sugar was 200.     Lesvia is concerned there is possibly an issue with patient's blood glucose meter due to variation.     Patient currently denies having any symptoms of headache, nausea, vomiting, etc. Patient does note that she has increased thirst this morning. Yesterday when patient had blood sugar of 433, patient reports that she was sweaty.     Lesvia requesting to talk further with diabetic educator, Mikayla Sellers. Advised Lesvia that Mikayla is with a patient and will have her call back shortly. Lesvia notes that Mikayla should contact patient with recommendations at [phone] and then also please call Lesvia at [phone] and leave detailed voicemail with recommendations. Lesvia wanted Mikayla to know that patient's meter time is off by 45 mins.    Please also note that patient has office visit tomorrow with Dr. Medrano.      Will send to Mikayla to review.       Anahy Bloom RN  Endocrine Care Coordinator  [facility]

## 2017-09-27 NOTE — TELEPHONE ENCOUNTER
Reason for Call:  Other FYI    Detailed comments: pt calling stating she was to call and give update after wrist fx. She states it is going well, not much pain. This is FYI only     Phone Number Patient can be reached at: Cell number on file:    Telephone Information:   Mobile [phone]       Best Time:     Can we leave a detailed message on this number?     Call taken on 9/27/2017 at 1:42 PM by Jovanna Ga

## 2017-09-28 ENCOUNTER — OFFICE VISIT (OUTPATIENT)
Dept: ENDOCRINOLOGY | Facility: CLINIC | Age: 62
End: 2017-09-28
Payer: MEDICARE

## 2017-09-28 VITALS
HEIGHT: 62 IN | BODY MASS INDEX: 34.85 KG/M2 | HEART RATE: 64 BPM | DIASTOLIC BLOOD PRESSURE: 68 MMHG | SYSTOLIC BLOOD PRESSURE: 123 MMHG | WEIGHT: 189.4 LBS

## 2017-09-28 DIAGNOSIS — E11.65 TYPE 2 DIABETES MELLITUS WITH HYPERGLYCEMIA, WITH LONG-TERM CURRENT USE OF INSULIN (H): Primary | ICD-10-CM

## 2017-09-28 DIAGNOSIS — Z79.4 TYPE 2 DIABETES MELLITUS WITH HYPERGLYCEMIA, WITH LONG-TERM CURRENT USE OF INSULIN (H): Primary | ICD-10-CM

## 2017-09-28 DIAGNOSIS — Z91.148 POOR COMPLIANCE WITH MEDICATION: ICD-10-CM

## 2017-09-28 DIAGNOSIS — E11.65 UNCONTROLLED TYPE 2 DIABETES MELLITUS WITH HYPERGLYCEMIA, WITH LONG-TERM CURRENT USE OF INSULIN (H): ICD-10-CM

## 2017-09-28 DIAGNOSIS — Z79.4 UNCONTROLLED TYPE 2 DIABETES MELLITUS WITH HYPERGLYCEMIA, WITH LONG-TERM CURRENT USE OF INSULIN (H): ICD-10-CM

## 2017-09-28 LAB — HBA1C MFR BLD: 10.9 % (ref 0–5.7)

## 2017-09-28 PROCEDURE — 99214 OFFICE O/P EST MOD 30 MIN: CPT | Performed by: INTERNAL MEDICINE

## 2017-09-28 PROCEDURE — 83036 HEMOGLOBIN GLYCOSYLATED A1C: CPT | Performed by: INTERNAL MEDICINE

## 2017-09-28 PROCEDURE — 36415 COLL VENOUS BLD VENIPUNCTURE: CPT | Performed by: INTERNAL MEDICINE

## 2017-09-28 RX ORDER — PIOGLITAZONEHYDROCHLORIDE 30 MG/1
30 TABLET ORAL DAILY
Qty: 90 TABLET | Refills: 3 | Status: SHIPPED | OUTPATIENT
Start: 2017-09-28 | End: 2017-12-05

## 2017-09-28 NOTE — PROGRESS NOTES
- Endocrinology Follow up -    Reason for visit/consult:  Uncontrolled type 2 diabetes mellitus with hyperglycemia, with long-term current use of insulin (H)    Primary care provider: Tiffany King    HPI: A 61 yo female second follow up DM2, she has remote history of colectomy. This is the third visit.   Used to use V go for several month, she was confused the system, worsened A1C, then we switch back to insulin injection regimens.    Currently lantus 38 utnis and novology 6-8-8 and sliding scale and metformin 1000 bid.   Last visit prscribed actos 30 mg daily, however she mentioned, she is not sure which one is actos and she may not taking actos.      Of note,  she has had sleeping issues, which distracts her life significantly. She cannot sleep at night, usually sleeps in the day, during the night, she has been hungry and eating peanuts butters, jelly, fruits etc.   Sometimes she cannot sleep for 2 days.   She has visiting nurse once a week, yesterday the nurse called our clinic that her glucose was 500. Talked with Mikayla, lantus was increased from 38 to 42 units.     Glucose: she forgot to bring glucometer today.       Assessment and Plan  - Agree with increasing Lantus to 42 units daily  - Continue current Novolog     Novolog 6-8-8     Novolog sliding scale               200-250: +2unit               250-300: +4 unit               300-350: +4               >350    :+6 unit  -Continue current metformin 1000 mg bid  - Prescribed again Actos 30 mg, she will go directly to the pharmacy.   - Compliance/life style issue, Mikayla will try to reach out patient in a few weeks.   -RTC with me in 4-6 months    I spent 30 minutes with this patient face to face and explained the conditions and plans (more than 50% of time was counseling/coordination of care, compliance, life style) . The patient understood and is satisfied with today's visit. Return to clinic with me in 3 months.          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     Past Surgical History:   Procedure Laterality Date     ARTHROSCOPY KNEE RT/LT      LT     BREAST LUMPECTOMY, RT/LT      LT-benign      C EXCIS PTERYGIUM  10/08    [facility]     C STOMACH SURGERY PROCEDURE UNLISTED       COLONOSCOPY  5/10/2012    Procedure:COLONOSCOPY; screening colonoscopy; Surgeon:MILLA VEGA; Location:[facility]     COLOSTOMY  2000     COLOSTOMY       HC COLONOSCOPY THRU STOMA, DIAGNOSTIC  2002    normal per report-no records available-records destroyed     HYSTERECTOMY, PAP NO LONGER INDICATED      done in [place]-cervical cancer     IMPLANT STIMULATOR SACRAL NERVE STAGE ONE Right 3/10/2015    Procedure: IMPLANT STIMULATOR SACRAL NERVE STAGE ONE;  Surgeon: Teodora Yusuf MD;  Location: [facility] OR     IMPLANT STIMULATOR SACRAL NERVE STAGE TWO Right 3/31/2015    Procedure: IMPLANT STIMULATOR SACRAL NERVE STAGE TWO;  Surgeon: Teodora Yusuf MD;  Location: [facility] OR     KNEE SURGERY       SALPINGO OOPHORECTOMY,R/L/JENNIFER      Salpingo Oophorectomy, RT/LT/JENNIFER       Allergies:  Allergies   Allergen Reactions     Morphine Sulfate Itching       Current Medications   Current Outpatient Prescriptions   Medication     metFORMIN (GLUCOPHAGE) 1000 MG tablet     insulin aspart (NOVOLOG FLEXPEN) 100 UNIT/ML injection     omeprazole (PRILOSEC) 20 MG CR capsule     enalapril (VASOTEC) 2.5 MG tablet     gabapentin (NEURONTIN) 300 MG capsule     ARIPiprazole (ABILIFY) 20 MG tablet     buPROPion (WELLBUTRIN  "XL) 150 MG 24 hr tablet     PARoxetine (PAXIL) 20 MG tablet     CVS ASPIRIN ADULT LOW DOSE 81 MG chewable tablet     pioglitazone (ACTOS) 30 MG tablet     insulin glargine (LANTUS SOLOSTAR) 100 UNIT/ML injection     BusPIRone HCl 30 MG TABS     blood glucose monitoring (ACCU-CHEK FASTCLIX) lancets     traMADol (ULTRAM) 50 MG tablet     simvastatin (ZOCOR) 20 MG tablet     propranolol (INDERAL LA) 60 MG capsule     nystatin (MYCOSTATIN) 377084 UNIT/GM POWD     blood glucose monitoring (ACCU-CHEK CELIA) test strip     ORDER FOR DME     insulin pen needle needle     Cholecalciferol (VITAMIN D) 1000 UNITS capsule     UNIVERSAL REMOVER WIPES EX MISC     SKIN PREP WIPES MISC     MULTI-VITAMIN OR TABS     ORDER FOR DME     B Complex Vitamins (B-COMPLEX/B-12 PO)     ibuprofen (ADVIL,MOTRIN) 200 MG tablet     No current facility-administered medications for this visit.        Family History:  Family History   Problem Relation Age of Onset     DIABETES Mother      CEREBROVASCULAR DISEASE Mother      DIABETES Father      Hypertension Father      CANCER Maternal Grandfather      CANCER Paternal Grandfather      DIABETES Brother      DIABETES Sister      Thyroid Disease Daughter      Thyroid Disease Sister      Multiple Sclerosis Daughter      Glaucoma No family hx of      Macular Degeneration No family hx of        Social History:  Social History   Substance Use Topics     Smoking status: Former Smoker     Quit date: 8/9/2016     Smokeless tobacco: Never Used     Alcohol use 0.0 oz/week     0 Standard drinks or equivalent per week      Comment: social occasions       ROS:  Full review of systems taken with the help of the intake sheet. Otherwise a complete 14 point review of systems was taken and is negative unless stated in the history above.    Physical Exam:   Blood pressure 123/68, pulse 64, height 1.575 m (5' 2\"), weight 85.9 kg (189 lb 6.4 oz).  General: well appearing, no acute distress, pleasant and conversant,   Mental " Status/neuro: alert and oriented  Face: symmetrical, normal facial color  Eyes: anicteric, PERRL, no proptosis or lid lag  Neck: suppler, no lymphadenopahty  Thyroid: normal size and texture, no nodule palpable, no bruits  Heart: regular rhythm, S1S2, no murmur appreciated  Lung: clear to auscultation bilaterally  Abdomen: soft, NT/ND, no hepatomegaly  Legs: no swelling or edema  Feet: no deformities or ulcers, 2+ DP pulses, first and second digits mildly decreased sensation      Labs (General):   Lab Results   Component Value Date     12/19/2016      Lab Results   Component Value Date    POTASSIUM 4.3 12/19/2016     Lab Results   Component Value Date    CHLORIDE 102 12/19/2016     Lab Results   Component Value Date    NICOLÁS 8.8 12/19/2016     Lab Results   Component Value Date    CO2 22 12/19/2016     Lab Results   Component Value Date    BUN 15 12/19/2016     Lab Results   Component Value Date    CR 0.88 12/19/2016     Lab Results   Component Value Date     12/19/2016     Lab Results   Component Value Date    TSH 1.24 12/19/2016     No results found for: T4  Lab Results   Component Value Date    A1C 12.0 05/26/2017

## 2017-09-28 NOTE — MR AVS SNAPSHOT
After Visit Summary   9/28/2017    Holly Muir    MRN: [id number]           Patient Information     Date Of Birth          1955        Visit Information        Provider Department      9/28/2017 12:15 PM Elizabeth Medrano MD;  ENDO NURSE [facility]        Today's Diagnoses     Type 2 diabetes mellitus with hyperglycemia, with long-term current use of insulin (H)    -  1    Type 2 diabetes mellitus, uncontrolled A1C goal <7%          Care Instructions      Sending blood sugars to your provider at [facility]:  We want to help you with your diabetes management, which often requires frequent adjustments to your therapy. For your convenience, we have several ways to send your blood sugars to your doctor for review.    - Send message directly to your doctor through My Chart.  Please ask the rooming staff if you would like to sign up for My Chart.  This is a fast and confidential way to send your information and communicate directly with your provider.   - Record readings and fax to [phone].  We have a template for you to use for your convenience.  - If you have a Medtronic pump, upload to [employer] and notify your provider of your username and password.   - Stop by the clinic with your meter for download.   - My Chart or call Mikayla Sellers, Diabetes Educator at [phone]  - Call the clinic and speak to one of the endocrine nurses to relay information on the telephone.  Argelia Higginbotham, or [facility] at [phone].   -    Please call the on-call Endocrinologist at the [place] for after       hours/weekend needs at [phone] Option #4.    Please note that you do not need to FAST if you are just having an A1C drawn. Please remember to ALWAYS bring your glucose meter with to your appointment. This data is very important for the management of your care.    Thank you!  Your [facility] Team                Follow-ups after your visit        Your next 10 appointments  already scheduled     Oct 13, 2017 10:00 AM CDT   Telephone Visit with [facility] C[facility] Provider   [facility]    [address] [facility] [address] [phone]           Note: this is not an onsite visit; there is no need to come to the facility.            Jan 26, 2018 10:30 AM CST   LAB with LAB FIRST FLOOR [facility] ([facility])    [address] [facility] [address] [phone]           Patient must bring picture ID. Patient should be prepared to give a urine specimen  Please do not eat 10-12 hours before your appointment if you are coming in fasting for labs on lipids, cholesterol, or glucose (sugar). Pregnant women should follow their Care Team instructions. Water with medications is okay. Do not drink coffee or other fluids. If you have concerns about taking  your medications, please ask at office or if scheduling via [employer], send a message by clicking on Secure Messaging, Message Your Care Team.            Jan 26, 2018 10:50 AM CST   PHYSICAL with Tiffany King MD PhD   [facility]    [address] [facility] [id number] [phone]            Feb 09, 2018 11:15 AM CST   Return Visit with Elizabeth Medrano MD,  ENDO NURSE   [facility])    [address] [facility] [id number] [phone]              Who to contact     If you have questions or need follow up information about today's clinic visit or your schedule please contact [facility] directly at [phone].  Normal or non-critical lab and imaging results will be communicated to you by MyChart, letter or phone within 4 business days after the clinic has received the results. If you do not hear from us within 7 days, please contact the clinic through MyChart or phone. If you have a critical or abnormal lab  "result, we will notify you by phone as soon as possible.  Submit refill requests through [employer] or call your pharmacy and they will forward the refill request to us. Please allow 3 business days for your refill to be completed.          Additional Information About Your Visit        [employer] Information     [employer] is an electronic gateway that provides easy, online access to your medical records. With [employer], you can request a clinic appointment, read your test results, renew a prescription or communicate with your care team.     To sign up for [email or web address] visit the website at [email or web address]   You will be asked to enter the access code listed below, as well as some personal information. Please follow the directions to create your username and password.     Your access code is: [email or web address]  Expires: 2017 10:11 AM     Your access code will  in 90 days. If you need help or a new code, please contact your [facility] or call [phone] for assistance.        Care EveryWhere ID     This is your Care EveryWhere ID. This could be used by other organizations to access your [facility] medical records  [id number]        Your Vitals Were     Pulse Height BMI (Body Mass Index)             64 1.575 m (5' 2\") 34.64 kg/m2          Blood Pressure from Last 3 Encounters:   17 123/68   17 118/80   17 121/82    Weight from Last 3 Encounters:   17 85.9 kg (189 lb 6.4 oz)   17 84.8 kg (187 lb)   17 84.8 kg (187 lb)              Today, you had the following     No orders found for display         Today's Medication Changes          These changes are accurate as of: 17 12:28 PM.  If you have any questions, ask your nurse or doctor.               These medicines have changed or have updated prescriptions.        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 (pt-stated)     Notes - Note created  12/9/2015 10:26 AM by Randy Suarez RN    As of today's date 12/9/2015 goal is met at 0 - 25%.   Goal Status:  Active          Equal Access to Services     MINERVA RIGGS : Hadii dharmesh Caban, waaxda ariadaha, qaybta kaalmada derikyajordana, kim iqbal . So [facility] [phone].    ATENCIÓN: Si habla español, tiene a lopez disposición servicios gratuitos de asistencia lingüística. Llame al [phone].    We comply with applicable federal civil rights laws and [place] laws. We do not discriminate on the basis of race, color, national origin, age, disability sex, sexual orientation or gender identity.            Thank you!     Thank you for choosing [facility]  for your care. Our goal is always to provide you with excellent care. Hearing back from our patients is one way we can continue to improve our services. Please take a few minutes to complete the written survey that you may receive in the mail after your visit with us. Thank you!             Your Updated Medication List - Protect others around you: Learn how to safely use, store and throw away your medicines at www.disposemymeds.org.          This list is accurate as of: 9/28/17 12:28 PM.  Always use your most recent med list.                   Brand Name Dispense Instructions for use Diagnosis    ARIPiprazole 20 MG tablet    ABILIFY     Take 1 tablet (20 mg) by mouth  daily Prescribed by Dr. 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        blood glucose monitoring lancets     9 Box    Use to test blood sugar 8 times daily or as directed.    Type 2 diabetes mellitus, uncontrolled (H)       blood glucose monitoring test strip    ACCU-CHEK CELIA    250 each    Use to test blood sugars 8 times daily or as directed.    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)       BusPIRone HCl 30 MG Tabs     60 tablet    TAKE 1 TABLET BY MOUTH TWICE A DAY    Anxiety       CVS ASPIRIN ADULT LOW DOSE 81 MG chewable tablet   Generic drug:  aspirin     90 tablet    TAKE 1 TABLET (81 MG) BY MOUTH DAILY ***OTC- NOT COVERED BY INS***    Uncontrolled diabetes mellitus (H)       enalapril 2.5 MG tablet    VASOTEC    90 tablet    TAKE 1 TABLET BY MOUTH EVERY DAY    Diabetes mellitus with background retinopathy (H)       gabapentin 300 MG capsule    NEURONTIN    360 capsule    1 capsule twice a day during the day and 2 capsules at night time    Diabetic polyneuropathy associated with type 2 diabetes mellitus (H)       ibuprofen 200 MG tablet    ADVIL/MOTRIN     Take 3-4 tablets by mouth every 4 hours as needed Reported on 2/24/2017        insulin aspart 100 UNIT/ML injection    NovoLOG FLEXPEN    30 mL    Take as directed with meals. Total daily dose approx 30 units.    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, with long-term  current use of insulin (H)       insulin pen needle 31G X 5 MM     3 Box    3 Box See Admin Instructions Use four times a day times a day    Type 2 diabetes mellitus, uncontrolled (H)       metFORMIN 1000 MG tablet    GLUCOPHAGE    60 tablet    TAKE 1 TABLET BY MOUTH 2 TIMES DAILY WITH MEALS    Uncontrolled type 2 diabetes mellitus with hyperglycemia, with long-term current use of insulin (H)       Multi-vitamin Tabs tablet   Generic drug:  multivitamin, therapeutic with minerals      1 TABLET DAILY        nystatin 277318 UNIT/GM Powd    MYCOSTATIN    60 g    Apply 1 dose topically 3 times daily as needed    Skin rash       omeprazole 20 MG CR capsule    priLOSEC    180 capsule    TAKE 2 CAPSULES BY MOUTH DAILY TAKE 30-60 MINUTES BEFORE A MEAL.    Gastroesophageal reflux disease without esophagitis       order for DME     1 Units    Equipment being ordered: Cane    Knee osteoarthritis       order for DME     3 Month    Equipment being ordered:  Ostomy supplies.  Drain Pouch by [employer] #.    Also needs Barrier by [employer] #.    S/P colostomy (H)       PARoxetine 20 MG tablet    PAXIL     TAKE 3 TABLETS AT BEDTIME (Prescribed by Dr. Isael Jiménez at [facility])    Severe major depression with psychotic features (H)       pioglitazone 30 MG tablet    ACTOS    90 tablet    Take 1 tablet (30 mg) by mouth daily    Uncontrolled type 2 diabetes mellitus with hyperglycemia, with long-term current use of insulin (H), Insulin resistance       propranolol 60 MG 24 hr capsule    INDERAL LA    60 capsule    Take 1 capsule (60 mg) by mouth daily May take a second dose if tremor recurs.    Tremor       simvastatin 20 MG tablet    ZOCOR    90 tablet    TAKE 1 TABLET (20 MG) BY MOUTH AT BEDTIME    High cholesterol       SKIN PREP WIPES Misc     1 Bottle    to be used with colostomy care    Colostomy       traMADol 50 MG tablet    ULTRAM    60 tablet    Take 1-2 tablets ( mg) by mouth daily as needed for  moderate pain    Arthritis of knee, Arthralgia of both knees       Universal Remover Wipes Misc     1 Each    uses to remove barrier from colostomy bag at time of change    Colostomy       vitamin D 1000 UNITS capsule     90 capsule    Take 2,000 to 4,000 Units per day

## 2017-09-28 NOTE — LETTER
9/28/2017       RE: Holly Muir  [address] [facility] [address]     Dear Colleague,    Thank you for referring your patient, Holly Muir, to the [facility] Please see a copy of my visit note below.                                                      - Endocrinology Follow up -    Reason for visit/consult:  Uncontrolled type 2 diabetes mellitus with hyperglycemia, with long-term current use of insulin (H)    Primary care provider: Tiffany King    HPI: A 61 yo female second follow up DM2, she has remote history of colectomy. This is the third visit.   Used to use V go for several month, she was confused the system, worsened A1C, then we switch back to insulin injection regimens.    Currently lantus 38 utnis and novology 6-8-8 and sliding scale and metformin 1000 bid.   Last visit prscribed actos 30 mg daily, however she mentioned, she is not sure which one is actos and she may not taking actos.      Of note,  she has had sleeping issues, which distracts her life significantly. She cannot sleep at night, usually sleeps in the day, during the night, she has been hungry and eating peanuts butters, jelly, fruits etc.   Sometimes she cannot sleep for 2 days.   She has visiting nurse once a week, yesterday the nurse called our clinic that her glucose was 500. Talked with Mikayla, lantus was increased from 38 to 42 units.     Glucose: she forgot to bring glucometer today.       Assessment and Plan  - Agree with increasing Lantus to 42 units daily  - Continue current Novolog     Novolog 6-8-8     Novolog sliding scale               200-250: +2unit               250-300: +4 unit               300-350: +4               >350    :+6 unit  -Continue current metformin 1000 mg bid  - Prescribed again Actos 30 mg, she will go directly to the pharmacy.   - Compliance/life style issue, Mikayla will try to reach out patient in a few weeks.   -RTC  with me in 4-6 months    I spent 30 minutes with this patient face to face and explained the conditions and plans (more than 50% of time was counseling/coordination of care, compliance, life style) . The patient understood and is satisfied with today's visit. Return to clinic with me in 3 months.         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     Past Surgical History:   Procedure Laterality Date     ARTHROSCOPY KNEE RT/LT      LT     BREAST LUMPECTOMY, RT/LT      LT-benign      C EXCIS PTERYGIUM  10/08    [facility]     C STOMACH SURGERY PROCEDURE UNLISTED       COLONOSCOPY  5/10/2012    Procedure:COLONOSCOPY; screening colonoscopy; Surgeon:MILLA VEGA; Location:[facility] OR     COLOSTOMY  2000     COLOSTOMY       HC COLONOSCOPY THRU STOMA, DIAGNOSTIC  2002    normal per report-no records available-records destroyed     HYSTERECTOMY, PAP NO LONGER INDICATED      done in [place]-cervical cancer     IMPLANT STIMULATOR SACRAL NERVE STAGE ONE Right 3/10/2015    Procedure: IMPLANT STIMULATOR SACRAL NERVE STAGE ONE;  Surgeon: Teodora Yusuf MD;  Location: [facility] OR     IMPLANT STIMULATOR SACRAL NERVE STAGE TWO Right 3/31/2015    Procedure: IMPLANT STIMULATOR SACRAL NERVE STAGE TWO;  Surgeon: Teodora Yusuf MD;  Location: [facility] OR     KNEE SURGERY       SALPINGO OOPHORECTOMY,R/L/JENNIFER      Salpingo Oophorectomy, RT/LT/JENNIFER       Allergies:  Allergies   Allergen Reactions     Morphine Sulfate Itching       Current Medications   Current Outpatient Prescriptions    Medication     metFORMIN (GLUCOPHAGE) 1000 MG tablet     insulin aspart (NOVOLOG FLEXPEN) 100 UNIT/ML injection     omeprazole (PRILOSEC) 20 MG CR capsule     enalapril (VASOTEC) 2.5 MG tablet     gabapentin (NEURONTIN) 300 MG capsule     ARIPiprazole (ABILIFY) 20 MG tablet     buPROPion (WELLBUTRIN XL) 150 MG 24 hr tablet     PARoxetine (PAXIL) 20 MG tablet     [facility] ASPIRIN ADULT LOW DOSE 81 MG chewable tablet     pioglitazone (ACTOS) 30 MG tablet     insulin glargine (LANTUS SOLOSTAR) 100 UNIT/ML injection     BusPIRone HCl 30 MG TABS     blood glucose monitoring (ACCU-CHEK FASTCLIX) lancets     traMADol (ULTRAM) 50 MG tablet     simvastatin (ZOCOR) 20 MG tablet     propranolol (INDERAL LA) 60 MG capsule     nystatin (MYCOSTATIN) 725020 UNIT/GM POWD     blood glucose monitoring (ACCU-CHEK CELIA) test strip     ORDER FOR DME     insulin pen needle needle     Cholecalciferol (VITAMIN D) 1000 UNITS capsule     UNIVERSAL REMOVER WIPES EX MISC     SKIN PREP WIPES MISC     MULTI-VITAMIN OR TABS     ORDER FOR DME     B Complex Vitamins (B-COMPLEX/B-12 PO)     ibuprofen (ADVIL,MOTRIN) 200 MG tablet     No current facility-administered medications for this visit.        Family History:  Family History   Problem Relation Age of Onset     DIABETES Mother      CEREBROVASCULAR DISEASE Mother      DIABETES Father      Hypertension Father      CANCER Maternal Grandfather      CANCER Paternal Grandfather      DIABETES Brother      DIABETES Sister      Thyroid Disease Daughter      Thyroid Disease Sister      Multiple Sclerosis Daughter      Glaucoma No family hx of      Macular Degeneration No family hx of        Social History:  Social History   Substance Use Topics     Smoking status: Former Smoker     Quit date: 8/9/2016     Smokeless tobacco: Never Used     Alcohol use 0.0 oz/week     0 Standard drinks or equivalent per week      Comment: social occasions       ROS:  Full review of systems taken with the help of the intake  "sheet. Otherwise a complete 14 point review of systems was taken and is negative unless stated in the history above.    Physical Exam:   Blood pressure 123/68, pulse 64, height 1.575 m (5' 2\"), weight 85.9 kg (189 lb 6.4 oz).  General: well appearing, no acute distress, pleasant and conversant,   Mental Status/neuro: alert and oriented  Face: symmetrical, normal facial color  Eyes: anicteric, PERRL, no proptosis or lid lag  Neck: suppler, no lymphadenopahty  Thyroid: normal size and texture, no nodule palpable, no bruits  Heart: regular rhythm, S1S2, no murmur appreciated  Lung: clear to auscultation bilaterally  Abdomen: soft, NT/ND, no hepatomegaly  Legs: no swelling or edema  Feet: no deformities or ulcers, 2+ DP pulses, first and second digits mildly decreased sensation      Labs (General):   Lab Results   Component Value Date     12/19/2016      Lab Results   Component Value Date    POTASSIUM 4.3 12/19/2016     Lab Results   Component Value Date    CHLORIDE 102 12/19/2016     Lab Results   Component Value Date    NICOLÁS 8.8 12/19/2016     Lab Results   Component Value Date    CO2 22 12/19/2016     Lab Results   Component Value Date    BUN 15 12/19/2016     Lab Results   Component Value Date    CR 0.88 12/19/2016     Lab Results   Component Value Date     12/19/2016     Lab Results   Component Value Date    TSH 1.24 12/19/2016     No results found for: T4  Lab Results   Component Value Date    A1C 12.0 05/26/2017           Again, thank you for allowing me to participate in the care of your patient.      Sincerely,    Elizabeth Medrano MD      "

## 2017-09-28 NOTE — NURSING NOTE
"Holly Muir's goals for this visit include:   Chief Complaint   Patient presents with     Diabetes       She requests these members of her care team be copied on today's visit information: Tiffany King      PCP: Tiffany King    Referring Provider:  No referring provider defined for this encounter.    Chief Complaint   Patient presents with     Diabetes       Initial /68  Pulse 64  Ht 1.575 m (5' 2\")  Wt 85.9 kg (189 lb 6.4 oz)  BMI 34.64 kg/m2 Estimated body mass index is 34.64 kg/(m^2) as calculated from the following:    Height as of this encounter: 1.575 m (5' 2\").    Weight as of this encounter: 85.9 kg (189 lb 6.4 oz).  Medication Reconciliation: complete    Do you need any medication refills at today's visit? Yes    Sunshine Styles LPN      "

## 2017-09-28 NOTE — PATIENT INSTRUCTIONS
Sending blood sugars to your provider at [facility]:  We want to help you with your diabetes management, which often requires frequent adjustments to your therapy. For your convenience, we have several ways to send your blood sugars to your doctor for review.    - Send message directly to your doctor through My Chart.  Please ask the rooming staff if you would like to sign up for My Chart.  This is a fast and confidential way to send your information and communicate directly with your provider.   - Record readings and fax to [phone].  We have a template for you to use for your convenience.  - If you have a Medtronic pump, upload to eMazeMe and notify your provider of your username and password.   - Stop by the clinic with your meter for download.   - My Chart or call Mikayla Sellers, Diabetes Educator at [phone]  - Call the clinic and speak to one of the endocrine nurses to relay information on the telephone.  Argelia Higginbotham, or Sunshine at [phone].   -    Please call the on-call Endocrinologist at the [facility] for after       hours/weekend needs at [phone] Option #4.    Please note that you do not need to FAST if you are just having an A1C drawn. Please remember to ALWAYS bring your glucose meter with to your appointment. This data is very important for the management of your care.    Thank you!  Your [facility] Care Team

## 2017-10-12 ENCOUNTER — MEDICAL CORRESPONDENCE (OUTPATIENT)
Dept: HEALTH INFORMATION MANAGEMENT | Facility: CLINIC | Age: 62
End: 2017-10-12

## 2017-10-13 ENCOUNTER — TELEPHONE (OUTPATIENT)
Dept: ENDOCRINOLOGY | Facility: CLINIC | Age: 62
End: 2017-10-13

## 2017-10-13 NOTE — TELEPHONE ENCOUNTER
Spoke with pt today for a bg report.   10/13: Fst  10/12: Fst, 2 hrs post B: 148, Pre L; 207, 10p: 209, 12am: 255, 1am: 82    Pt advised to increase Lantus dose from 42 to 44 units. Pt advised I will call her in 2 weeks for another bg report.     Mikayla Sellers, RN, BSN, CDE   [facility]

## 2017-10-18 DIAGNOSIS — Z79.4 UNCONTROLLED TYPE 2 DIABETES MELLITUS WITH HYPERGLYCEMIA, WITH LONG-TERM CURRENT USE OF INSULIN (H): ICD-10-CM

## 2017-10-18 DIAGNOSIS — E11.65 UNCONTROLLED TYPE 2 DIABETES MELLITUS WITH HYPERGLYCEMIA, WITH LONG-TERM CURRENT USE OF INSULIN (H): ICD-10-CM

## 2017-10-18 NOTE — TELEPHONE ENCOUNTER
metFORMIN (GLUCOPHAGE) 1000 MG tablet        Last Written Prescription Date: 9/20/2017  Last Fill Quantity: 60, # refills: 0  Last Office Visit with [facility], [facility] or [facility] prescribing provider:  7/26/2017   Next 5 appointments (look out 90 days)     Oct 30, 2017 11:00 AM CDT   Telephone Visit with Mg Cde Provider   [facility]    [address] [place] [id number] [phone]                   BP Readings from Last 3 Encounters:   09/28/17 123/68   09/06/17 118/80   08/16/17 121/82     Lab Results   Component Value Date    MICROL 18 12/19/2016     Lab Results   Component Value Date    UMALCR 47.15 12/19/2016     Creatinine   Date Value Ref Range Status   12/19/2016 0.88 0.52 - 1.04 mg/dL Final   ]  GFR Estimate   Date Value Ref Range Status   12/19/2016 66 >60 mL/min/1.7m2 Final   05/25/2016 74 >60 mL/min/1.7m2 Final     Comment:     Non  GFR Calc   03/31/2015 72 >60 mL/min/1.7m2 Final     Comment:     Non  GFR Calc     GFR Estimate If Black   Date Value Ref Range Status   12/19/2016 79 >60 mL/min/1.7m2 Final   05/25/2016 89 >60 mL/min/1.7m2 Final     Comment:      GFR Calc   03/31/2015 87 >60 mL/min/1.7m2 Final     Comment:      GFR Calc     Lab Results   Component Value Date    CHOL 134 12/19/2016     Lab Results   Component Value Date    HDL 58 12/19/2016     Lab Results   Component Value Date    LDL 21 12/19/2016     Lab Results   Component Value Date    TRIG 274 12/19/2016     Lab Results   Component Value Date    CHOLHDLRATIO 2.0 09/18/2015     Lab Results   Component Value Date    AST 29 12/19/2016     Lab Results   Component Value Date    ALT 58 12/19/2016     Lab Results   Component Value Date    A1C 10.9 09/28/2017    A1C 12.0 05/26/2017    A1C 10.9 12/19/2016    A1C 10.8 05/25/2016    A1C 10.6 03/21/2016     Potassium   Date Value Ref Range Status   12/19/2016 4.3 3.4 - 5.3 mmol/L  Final     Refilled per [facility] protocol.    Helena Dickson RN

## 2017-10-21 ENCOUNTER — TELEPHONE (OUTPATIENT)
Dept: PEDIATRICS | Facility: CLINIC | Age: 62
End: 2017-10-21

## 2017-10-21 DIAGNOSIS — M17.10 ARTHRITIS OF KNEE: ICD-10-CM

## 2017-10-21 DIAGNOSIS — M25.561 ARTHRALGIA OF BOTH KNEES: ICD-10-CM

## 2017-10-21 DIAGNOSIS — M25.562 ARTHRALGIA OF BOTH KNEES: ICD-10-CM

## 2017-10-23 NOTE — TELEPHONE ENCOUNTER
TRAMADOL HCL 50 MG TABLET      Last Written Prescription Date: 2/22/17  Last Fill Quantity: 60,  # refills: 3   Last Office Visit with [facility], P or [facility] prescribing provider: 7/26/17                                         Next 5 appointments (look out 90 days)     Oct 30, 2017 11:00 AM CDT   Telephone Visit with Mg Cde Provider   [facility]    [address] [place] [id number] [phone]                  Routing refill request to provider for review/approval because:  Drug not on the [facility] refill protocol     Amairani Hernandez RN   [facility], Primary Care

## 2017-10-24 RX ORDER — TRAMADOL HYDROCHLORIDE 50 MG/1
TABLET ORAL
Qty: 60 TABLET | Refills: 1 | Status: SHIPPED | OUTPATIENT
Start: 2017-10-24 | End: 2017-12-21

## 2017-10-25 ENCOUNTER — TELEPHONE (OUTPATIENT)
Dept: PEDIATRICS | Facility: CLINIC | Age: 62
End: 2017-10-25

## 2017-10-25 ENCOUNTER — RADIANT APPOINTMENT (OUTPATIENT)
Dept: GENERAL RADIOLOGY | Facility: CLINIC | Age: 62
End: 2017-10-25
Attending: FAMILY MEDICINE
Payer: MEDICARE

## 2017-10-25 ENCOUNTER — OFFICE VISIT (OUTPATIENT)
Dept: URGENT CARE | Facility: URGENT CARE | Age: 62
End: 2017-10-25
Payer: MEDICARE

## 2017-10-25 VITALS
DIASTOLIC BLOOD PRESSURE: 69 MMHG | TEMPERATURE: 97.2 F | HEART RATE: 74 BPM | BODY MASS INDEX: 35.67 KG/M2 | WEIGHT: 195 LBS | SYSTOLIC BLOOD PRESSURE: 109 MMHG | OXYGEN SATURATION: 100 %

## 2017-10-25 DIAGNOSIS — M54.50 CHRONIC BILATERAL LOW BACK PAIN WITHOUT SCIATICA: ICD-10-CM

## 2017-10-25 DIAGNOSIS — G89.29 CHRONIC BILATERAL LOW BACK PAIN WITHOUT SCIATICA: ICD-10-CM

## 2017-10-25 DIAGNOSIS — M54.50 ACUTE LEFT-SIDED LOW BACK PAIN WITHOUT SCIATICA: Primary | ICD-10-CM

## 2017-10-25 DIAGNOSIS — E11.65 UNCONTROLLED TYPE 2 DIABETES MELLITUS WITH HYPERGLYCEMIA, WITH LONG-TERM CURRENT USE OF INSULIN (H): ICD-10-CM

## 2017-10-25 DIAGNOSIS — Z79.4 UNCONTROLLED TYPE 2 DIABETES MELLITUS WITH HYPERGLYCEMIA, WITH LONG-TERM CURRENT USE OF INSULIN (H): ICD-10-CM

## 2017-10-25 PROCEDURE — 72100 X-RAY EXAM L-S SPINE 2/3 VWS: CPT

## 2017-10-25 PROCEDURE — 99214 OFFICE O/P EST MOD 30 MIN: CPT | Performed by: FAMILY MEDICINE

## 2017-10-25 RX ORDER — HYDROCODONE BITARTRATE AND ACETAMINOPHEN 5; 325 MG/1; MG/1
1 TABLET ORAL 2 TIMES DAILY PRN
Qty: 10 TABLET | Refills: 0 | Status: SHIPPED | OUTPATIENT
Start: 2017-10-25 | End: 2017-10-30

## 2017-10-25 ASSESSMENT — PAIN SCALES - GENERAL: PAINLEVEL: SEVERE PAIN (7)

## 2017-10-25 NOTE — TELEPHONE ENCOUNTER
"[facility] Call Center    Phone Message    Name of Caller: Meredith    Phone Number: Other phone number:  [phone]    Best time to return call: Any    May a detailed message be left on voicemail: yes    Relation to patient: Other Name: Home Care  Relationship: RN  Is there legal documentation in chart to discuss information with this person: Yes. Legal Documentation is verified by .      Reason for Call: Symptoms or Concerns     Does patient have any of the following \"red flag\" symptoms: None    Does patient have any \"Red Flag\" symptoms? No.     Current symptom or concern: Meredith saw Holly this morning and called to report patients back pain that she is experiencing. Patients states back pain is rating a 10 out 10 normally rates a 5 and is taking Tramadol and applying warm and cool packs to back. Meredith would like a call back to discuss if there is further recommendations for patient. Please advise.           Action Taken: Message routed to:  Primary Care p [phone]  "

## 2017-10-25 NOTE — PROGRESS NOTES
Cc: back pain    Patient has been having back pain  Woke up with the pain.  Patient is taking care of a 2yo grandchild.  But otherwise can't recall injury  Worse with movement  spasming up  Having hard time getting dressed.  No thoughts of harming self or others    Patient is diabetic per patient sugars have been in the range 145-164    No fevers or chills chest pain or shortness of breath     History of trauma: none   History of abdominal aortic aneurysm or male age 65-75 ever smoked: none     Patient taking tramadol and ibuprofen not much relief      Problem list, Medication list, Allergies, and Medical/Social/Surgical histories reviewed in [facility] and updated as appropriate.        REVIEW OF SYSTEMS  General: negative for fever, constitutional symptoms or weight loss  Resp: negative for chest pain or shortness of breath  CV: negative for chest pain  : negative for dysuria , incontinence, frequency  Musculoskeletal: as above  Neurologic: negative for ataxia, saddle anesthesia, fecal or urinary incontinence, one sided weakness,  Paresthesias  Constitutional, HEENT, cardiovascular, pulmonary, gi and gu systems are negative, except as otherwise noted.    Physical Exam:  Vitals: /69  Pulse 74  Temp 97.2  F (36.2  C) (Oral)  Wt 195 lb (88.5 kg)  SpO2 100%  Breastfeeding? No  BMI 35.67 kg/m2  BMI= Body mass index is 35.67 kg/(m^2).  Constitutional: healthy, alert and no acute distress   Head: atraumatic  CARDIO: regular in rate and rhythm no murmurs rubs or gallops  RESP: lungs clear to auscultation  ABDOMEN: soft nontender  NEURO: Patellar reflexes intact and equal b/l  BACK:  Straight leg raise intact, No spine tenderness  Positive left lumbar paraspinal muscle tenderness to palpation, strength intact and equal b/l lower extremities. Sensory intact. Rectal exam declined/deferred.   GAIT: intact  Psychiatric: mentation appears normal and affect normal/bright. No thoughts of harming self or others  Denies any  auditory or visual hallucinations.       Impression:    ICD-10-CM    1. Acute left-sided low back pain without sciatica M54.5 XR Lumbar Spine 2/3 Views     HYDROcodone-acetaminophen (NORCO) 5-325 MG per tablet   2. Chronic bilateral low back pain without sciatica M54.5 HYDROcodone-acetaminophen (NORCO) 5-325 MG per tablet    G89.29    3. Uncontrolled type 2 diabetes mellitus with hyperglycemia, with long-term current use of insulin (H) E11.65     Z79.4          Plan:  Per patient pain is in the exact same location as her usual pain. Just worse than her usual.   Quality is also the same and feels like spasm.   Patient already on tramadol per primary care provider for her chronic pain  Patient was looked up in [facility] and there are No concerns for controlled substance misuse.  Acute on chronic back pain - possible strain patient has been taking care of 4yo granddaughter    Ok for limited supply vicodin, may take up to 2 tablets a day only for breakthrough pain not better with tramadol  Warned sedating and habit forming. Warned about use of multiple sedating medications  Sedating medications given. Aware not to drive or operate machinery while on these medications. Caution with .     Will check xray    DM per patient she is checking her sugars and is normal. Question if true, A1c was very elevated last time. Recommend primary care provider follow up and patient agrees.     Instructions for back care and return precautions discussed.    back pain stretching excercises discussed. supportive treatment.  considery physical therapy if not better despite supportive treatment.  activity modifications advised.  Over the counter medications discussed. Patient aware to avoid NSAIDS if with any kidney disease or ulcers. Proper dosing of over the counter medications likewise discussed.  Adverse reaction to medication discussed.  aware to come in right away if with any fever or chills, worsening symptoms, headache, bowel or  bladder incontinence, motor or sensory deficits or gait disturbances.   follow-up recommended.      Alley Hernandez MD

## 2017-10-25 NOTE — LETTER
October 26, 2017    Holly Muir  [address] [facility] [address]            Dear Holly,    Your Xray showed multiple arthritic changes and some movement in the spine   If persistent symptoms recommend being seen. Below is a copy of the results.  It was a pleasure to see you at your last appointment.    If you have any questions or concerns, please call myself or my nurse at [phone].    Sincerely,    Alley Hernandez MD/ks    Results for orders placed or performed in visit on 10/25/17   XR Lumbar Spine 2/3 Views    Narrative    LUMBAR SPINE TWO TO THREE VIEWS October 25, 2017 5:34 PM     HISTORY: Low back pain.    COMPARISON: None.      Impression    IMPRESSION: There are five lumbar type vertebrae. There is moderate  degenerative disc disease at L5-S1. There is grade 1 anterolisthesis  at this level. Mild degenerative disc disease at L3-L4 and L4-L5. The  vertebral body heights are maintained. A radiopaque lead projects over  the right pelvis.    DAVID WORTHINGTON MD

## 2017-10-25 NOTE — TELEPHONE ENCOUNTER
Meredith states patient c/o 10/10 back pain today.  Normally rates at a 5.  Taking tramadol, ibuprofen PRN and RN encouraged her to use ice and heat to decrease the inflammation.  Lay on her back with feet on couch. And RN placed biofreeze on lower back.  Pain does not radiate anywhere else.  Patient thinks its from vacuuming and doing the dishes yesterday.      She does take care of her grand daughter and today when changing her diaper she normally squats down but was very guarded and kneeled with back straight.    RN feels its more muscular as when she was putting on biofreeze her muscles were really tight.  RN sees patient weekly and noticed today she was just not herself.  Patient states she did not sleep very well last night due to the pain.      Routed to Dr. King to advise.    Zoraida Min RN,   [facility]

## 2017-10-25 NOTE — MR AVS SNAPSHOT
After Visit Summary   10/25/2017    Holly Muir    MRN: [id number]           Patient Information     Date Of Birth          1955        Visit Information        Provider Department      10/25/2017 5:00 PM Alley Hernandez MD [facility]        Today's Diagnoses     Acute left-sided low back pain without sciatica    -  1    Chronic bilateral low back pain without sciatica        Uncontrolled type 2 diabetes mellitus with hyperglycemia, with long-term current use of insulin (H)           Follow-ups after your visit        Your next 10 appointments already scheduled     Oct 30, 2017 11:00 AM CDT   Telephone Visit with Mg Cde Provider   [facility])    [address] [place] [id number] [phone]           Note: this is not an onsite visit; there is no need to come to the facility.            Jan 26, 2018 10:30 AM CST   LAB with LAB FIRST FLOOR [facility])    [address] [place] [id number] [phone]           Patient must bring picture ID. Patient should be prepared to give a urine specimen  Please do not eat 10-12 hours before your appointment if you are coming in fasting for labs on lipids, cholesterol, or glucose (sugar). Pregnant women should follow their Care Team instructions. Water with medications is okay. Do not drink coffee or other fluids. If you have concerns about taking  your medications, please ask at office or if scheduling via [email or web address], send a message by clicking on Secure Messaging, Message Your Care Team.            Jan 26, 2018 10:50 AM CST   PHYSICAL with Tiffany King MD PhD   [facility])    [address] [address] [place] [id number]   [phone]            Feb 09, 2018 11:15 AM CST   Return Visit with Elizabeth Medrano MD,  ENDO NURSE   [facility]  "[facility]    [address] [address] [place] [id number] [phone]              Who to contact     If you have questions or need follow up information about today's clinic visit or your schedule please contact [facility] directly at [phone].  Normal or non-critical lab and imaging results will be communicated to you by MyChart, letter or phone within 4 business days after the clinic has received the results. If you do not hear from us within 7 days, please contact the clinic through MyChart or phone. If you have a critical or abnormal lab result, we will notify you by phone as soon as possible.  Submit refill requests through [employer] or call your pharmacy and they will forward the refill request to us. Please allow 3 business days for your refill to be completed.          Additional Information About Your Visit        MyChart Information     [employer]Cell lets you send messages to your doctor, view your test results, renew your prescriptions, schedule appointments and more. To sign up, go to [email or web address] . Click on \"Log in\" on the left side of the screen, which will take you to the Welcome page. Then click on \"Sign up Now\" on the right side of the page.     You will be asked to enter the access code listed below, as well as some personal information. Please follow the directions to create your username and password.     Your access code is: [email or web address]  Expires: 2017 10:11 AM     Your access code will  in 90 days. If you need help or a new code, please call your [facility] or [phone].        Care EveryWhere ID     This is your Care EveryWhere ID. This could be used by other organizations to access your [facility] medical records  [id number]        Your Vitals Were     Pulse Temperature Pulse Oximetry Breastfeeding? BMI (Body Mass Index)       74 97.2  F (36.2  C) (Oral) 100% No 35.67 kg/m2        Blood Pressure from Last 3 Encounters:   10/25/17 " 109/69   09/28/17 123/68   09/06/17 118/80    Weight from Last 3 Encounters:   10/25/17 195 lb (88.5 kg)   09/28/17 189 lb 6.4 oz (85.9 kg)   09/06/17 187 lb (84.8 kg)              We Performed the Following     XR Lumbar Spine 2/3 Views          Today's Medication Changes          These changes are accurate as of: 10/25/17  6:38 PM.  If you have any questions, ask your nurse or doctor.               Start taking these medicines.        Dose/Directions    HYDROcodone-acetaminophen 5-325 MG per tablet   Commonly known as:  NORCO   Used for:  Acute left-sided low back pain without sciatica, Chronic bilateral low back pain without sciatica   Started by:  Alley Hernandez MD        Dose:  1 tablet   Take 1 tablet by mouth 2 times daily as needed (breakthrough pain with tramadol)   Quantity:  10 tablet   Refills:  0         These medicines have changed or have updated prescriptions.        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  Ask your nurse if you have questions.     Bring a paper prescription for each of these medications     HYDROcodone-acetaminophen 5-325 MG per tablet                Primary Care Provider Office Phone # Fax #    Tiffany King MD PhD [phone]       [address] [address] [place] [id number]        Goals        General    I will continue to monitor my blood sugars and treat the results as directed. (pt-stated)     Notes - Note created  12/9/2015 10:26 AM by Randy Suarez RN    As of today's date 12/9/2015  goal is met at 0 - 25%.   Goal Status:  Active          Equal Access to Services     KATRIN ROGELCHANELLE : Hadii dharmesh ku hung Caban, warenada lutk, abi rm meredithtraci, kim jonoin hayaabrenden harperchristopher sanders la'darrionbrenden gila. So [facility] [phone].    ATENCIÓN: Si habla español, tiene a lopez disposición servicios gratuitos de asistencia lingüística. Llame al [phone].    We comply with applicable federal civil rights laws and [place] laws. We do not discriminate on the basis of race, color, national origin, age, disability, sex, sexual orientation, or gender identity.            Thank you!     Thank you for choosing [facility]  for your care. Our goal is always to provide you with excellent care. Hearing back from our patients is one way we can continue to improve our services. Please take a few minutes to complete the written survey that you may receive in the mail after your visit with us. Thank you!             Your Updated Medication List - Protect others around you: Learn how to safely use, store and throw away your medicines at www.disposemymeds.org.          This list is accurate as of: 10/25/17  6:38 PM.  Always use your most recent med list.                   Brand Name Dispense Instructions for use Diagnosis    ARIPiprazole 20 MG tablet    ABILIFY     Take 1 tablet (20 mg) by mouth daily Prescribed by Dr. Isael Jiménez at [facility] [place]    Visual hallucination, Severe recurrent major depressive disorder with psychotic features (H)       B-COMPLEX/B-12 PO      Take 1 tablet by mouth daily.        blood glucose monitoring lancets     9 Box    Use to test blood sugar 8 times daily or as directed.    Type 2 diabetes mellitus, uncontrolled (H)       blood glucose monitoring test strip    ACCU-CHEK CELIA    250 each    Use to test blood sugars 8 times daily or as directed.    Type 2 diabetes mellitus, uncontrolled (H)       buPROPion 150 MG 24 hr tablet    WELLBUTRIN XL     TAKE 3 TABLETS (450 MG)  BY MOUTH EVERY MORNING    Visual hallucination, Severe recurrent major depressive disorder with psychotic features (H)       BusPIRone HCl 30 MG Tabs     60 tablet    TAKE 1 TABLET BY MOUTH TWICE A DAY    Anxiety       [facility] ASPIRIN ADULT LOW DOSE 81 MG chewable tablet   Generic drug:  aspirin     90 tablet    TAKE 1 TABLET (81 MG) BY MOUTH DAILY ***OTC- NOT COVERED BY INS***    Uncontrolled diabetes mellitus (H)       enalapril 2.5 MG tablet    VASOTEC    90 tablet    TAKE 1 TABLET BY MOUTH EVERY DAY    Diabetes mellitus with background retinopathy (H)       gabapentin 300 MG capsule    NEURONTIN    360 capsule    1 capsule twice a day during the day and 2 capsules at night time    Diabetic polyneuropathy associated with type 2 diabetes mellitus (H)       HYDROcodone-acetaminophen 5-325 MG per tablet    NORCO    10 tablet    Take 1 tablet by mouth 2 times daily as needed (breakthrough pain with tramadol)    Acute left-sided low back pain without sciatica, Chronic bilateral low back pain without sciatica       ibuprofen 200 MG tablet    ADVIL/MOTRIN     Take 3-4 tablets by mouth every 4 hours as needed Reported on 2/24/2017        insulin aspart 100 UNIT/ML injection    NovoLOG FLEXPEN    30 mL    Take as directed with meals. Total daily dose approx 30 units.    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, with long-term current use of insulin (H)       insulin pen needle 31G X 5 MM     3 each    Use four times 4 day times a day        metFORMIN 1000 MG tablet    GLUCOPHAGE    60 tablet    TAKE 1 TABLET BY MOUTH 2 TIMES DAILY WITH MEALS    Uncontrolled type 2 diabetes mellitus with hyperglycemia, with long-term current use of insulin (H)       Multi-vitamin Tabs tablet   Generic drug:   multivitamin, therapeutic with minerals      1 TABLET DAILY        nystatin 232415 UNIT/GM Powd    MYCOSTATIN    60 g    Apply 1 dose topically 3 times daily as needed    Skin rash       omeprazole 20 MG CR capsule    priLOSEC    180 capsule    TAKE 2 CAPSULES BY MOUTH DAILY TAKE 30-60 MINUTES BEFORE A MEAL.    Gastroesophageal reflux disease without esophagitis       order for DME     1 Units    Equipment being ordered: Cane    Knee osteoarthritis       order for DME     3 Month    Equipment being ordered:  Ostomy supplies.  Drain Pouch by [employer] #.    Also needs Barrier by [employer] #.    S/P colostomy (H)       PARoxetine 20 MG tablet    PAXIL     TAKE 3 TABLETS AT BEDTIME (Prescribed by Dr. Isael Jim[facility] at [facility])    Severe major depression with psychotic features (H)       * pioglitazone 30 MG tablet    ACTOS    90 tablet    Take 1 tablet (30 mg) by mouth daily    Uncontrolled type 2 diabetes mellitus with hyperglycemia, with long-term current use of insulin (H), Insulin resistance       * pioglitazone 30 MG tablet    ACTOS    90 tablet    Take 1 tablet (30 mg) by mouth daily    Type 2 diabetes mellitus with hyperglycemia, with long-term current use of insulin (H)       propranolol 60 MG 24 hr capsule    INDERAL LA    60 capsule    Take 1 capsule (60 mg) by mouth daily May take a second dose if tremor recurs.    Tremor       simvastatin 20 MG tablet    ZOCOR    90 tablet    TAKE 1 TABLET (20 MG) BY MOUTH AT BEDTIME    High cholesterol       SKIN PREP WIPES Misc     1 Bottle    to be used with colostomy care    Colostomy       traMADol 50 MG tablet    ULTRAM    60 tablet    TAKE 1-2 TABLETS BY MOUTH DAILY AS NEEDED FOR MODERATE PAIN    Arthritis of knee, Arthralgia of both knees       Universal Remover Wipes Misc     1 Each    uses to remove barrier from colostomy bag at time of change    Colostomy       vitamin D 1000 UNITS capsule     90 capsule    Take 2,000 to 4,000 Units per day         * Notice:  This list has 2 medication(s) that are the same as other medications prescribed for you. Read the directions carefully, and ask your doctor or other care provider to review them with you.

## 2017-10-25 NOTE — TELEPHONE ENCOUNTER
Patient informed of Dr. CHARLES's note.  She states she will go to  after her daughter gets off of work.        Also called Meredith, home care RN and informed her.    Zoraida Min RN,   [facility], [facility]

## 2017-10-25 NOTE — NURSING NOTE
"Chief Complaint   Patient presents with     Back Pain     previous back pain but pain increased to 10/10 yesterday       Initial /69  Pulse 74  Temp 97.2  F (36.2  C) (Oral)  Wt 195 lb (88.5 kg)  SpO2 100%  Breastfeeding? No  BMI 35.67 kg/m2 Estimated body mass index is 35.67 kg/(m^2) as calculated from the following:    Height as of 9/28/17: 5' 2\" (1.575 m).    Weight as of this encounter: 195 lb (88.5 kg).  Medication Reconciliation: complete   Sherin Kruger MA      "

## 2017-10-28 ENCOUNTER — TRANSFERRED RECORDS (OUTPATIENT)
Dept: HEALTH INFORMATION MANAGEMENT | Facility: CLINIC | Age: 62
End: 2017-10-28

## 2017-10-30 ENCOUNTER — TELEPHONE (OUTPATIENT)
Dept: ENDOCRINOLOGY | Facility: CLINIC | Age: 62
End: 2017-10-30

## 2017-10-30 NOTE — TELEPHONE ENCOUNTER
Spoke with pt for a bg report. Readings are as follws:  10/30: 11am 180  10/29: no bg checks done  10/28: 1pm: 303  10/27: 6am: 109, 10am: 199, 1pm: 138  10/26: 3am: 82, 11am: 208  10/25: 10am: 74, 3pm: 219  Pt advised to continue with same insulin doses. Will call pt in four weeks for a bg update.Pt verbalizied understanding.    Mikayla Sellers, RN, BSN, CDE   [facility]

## 2017-11-02 DIAGNOSIS — Z53.9 DIAGNOSIS NOT YET DEFINED: Primary | ICD-10-CM

## 2017-11-02 PROCEDURE — G0179 MD RECERTIFICATION HHA PT: HCPCS | Performed by: INTERNAL MEDICINE

## 2017-11-08 DIAGNOSIS — R25.1 TREMOR: ICD-10-CM

## 2017-11-10 NOTE — TELEPHONE ENCOUNTER
The pt called back and an appt with Dr Becerra was made for 12/5. Routed to the provider to sign because of a drug-drug interaction between Propranolol and Insulin.     Alejandra Durham RN

## 2017-11-11 RX ORDER — PROPRANOLOL HCL 60 MG
CAPSULE, EXTENDED RELEASE 24HR ORAL
Qty: 60 CAPSULE | Refills: 0 | Status: SHIPPED | OUTPATIENT
Start: 2017-11-11 | End: 2017-12-05

## 2017-11-15 ENCOUNTER — TELEPHONE (OUTPATIENT)
Dept: PEDIATRICS | Facility: CLINIC | Age: 62
End: 2017-11-15

## 2017-11-15 NOTE — TELEPHONE ENCOUNTER
Spoke with Meredith, she reports that at about 0100 this day 11/15, the patient went turn the light switch on as she took a step down the stairs and overcompensated the step and lost her balance. The patient did not fall down the stairs she did fall to the floor. The patient did not hit her head. Meredith reports that the patient may have sprained or twisted her LEFT ankle as it was a little swollen this morning, but has come down quite a lot. Meredith does not believe it is broken or fractured as the patient can move all toes and place weight on it without difficulty. Meredith mentioned that there were no rugs or any other environmental hazards such as blocked pathway or electrical cords to lead to the fall.     Meredith will encourage, ice, elevation, and rest as well as utilizing PRN medications such as the Tramadol and Ibuprofen.     Meredith and patient will monitor the ankle and if any concerns arise they will seek medical attention.     Meredith also wanted Dr. King, to know that the patients psychiatrist decreased her PARoxetine (PAXIL) 20 MG tablet to 40 mg daily instead of the 20 mg TID. This lowered dose will begin tomorrow 11/16. Helena Dickson RN

## 2017-11-15 NOTE — TELEPHONE ENCOUNTER
[facility] Call Center    Phone Message    Name of Caller: Meredith    Phone Number: Other phone number:  [phone]    Best time to return call: Any    May a detailed message be left on voicemail: yes    Relation to patient: Other Name: [facility]  Relationship: RN  Is there legal documentation in chart to discuss information with this person: Yes. Legal Documentation is verified by Third Party.      Reason for Call: Other: Meredith is calling to report that patient fell- Also, patients Psychiatrist decreased her Paxil to 40 mg a day. Please advise.       Action Taken: Message routed to:  Primary Care p [phone]

## 2017-11-16 ENCOUNTER — TRANSFERRED RECORDS (OUTPATIENT)
Dept: HEALTH INFORMATION MANAGEMENT | Facility: CLINIC | Age: 62
End: 2017-11-16

## 2017-11-21 ENCOUNTER — OFFICE VISIT (OUTPATIENT)
Dept: URGENT CARE | Facility: URGENT CARE | Age: 62
End: 2017-11-21
Payer: MEDICARE

## 2017-11-21 ENCOUNTER — RADIANT APPOINTMENT (OUTPATIENT)
Dept: GENERAL RADIOLOGY | Facility: CLINIC | Age: 62
End: 2017-11-21
Attending: PHYSICIAN ASSISTANT
Payer: MEDICARE

## 2017-11-21 VITALS
WEIGHT: 197 LBS | BODY MASS INDEX: 36.03 KG/M2 | HEART RATE: 79 BPM | DIASTOLIC BLOOD PRESSURE: 77 MMHG | SYSTOLIC BLOOD PRESSURE: 120 MMHG | OXYGEN SATURATION: 98 % | TEMPERATURE: 97.8 F

## 2017-11-21 DIAGNOSIS — S99.912A ANKLE INJURY, LEFT, INITIAL ENCOUNTER: ICD-10-CM

## 2017-11-21 DIAGNOSIS — R93.89 ABNORMAL X-RAY: ICD-10-CM

## 2017-11-21 DIAGNOSIS — S93.402A SPRAIN OF LEFT ANKLE, UNSPECIFIED LIGAMENT, INITIAL ENCOUNTER: ICD-10-CM

## 2017-11-21 DIAGNOSIS — S99.912A ANKLE INJURY, LEFT, INITIAL ENCOUNTER: Primary | ICD-10-CM

## 2017-11-21 PROCEDURE — 73610 X-RAY EXAM OF ANKLE: CPT | Mod: LT

## 2017-11-21 PROCEDURE — 99213 OFFICE O/P EST LOW 20 MIN: CPT | Performed by: PHYSICIAN ASSISTANT

## 2017-11-21 NOTE — NURSING NOTE
"Chief Complaint   Patient presents with     Trauma     1 week ago fell down the stairs and injured her L Ankle       Initial /77  Pulse 79  Temp 97.8  F (36.6  C) (Oral)  Wt 197 lb (89.4 kg)  SpO2 98%  BMI 36.03 kg/m2 Estimated body mass index is 36.03 kg/(m^2) as calculated from the following:    Height as of 9/28/17: 5' 2\" (1.575 m).    Weight as of this encounter: 197 lb (89.4 kg).  Medication Reconciliation: complete     SHAUN MADISON      "

## 2017-11-21 NOTE — PROGRESS NOTES
SUBJECTIVE:                                                    Holly Muir is a 62 year old female who presents to clinic today for the following health issues:      Musculoskeletal problem/pain      Duration: 1 week    Description  Location: L Ankle    Intensity:  moderate    Accompanying signs and symptoms: none    History  Previous similar problem: no   Previous evaluation:  none    Precipitating or alleviating factors:  Trauma or overuse: no   Aggravating factors include: none    Therapies tried and outcome: nothing     Fell on the stairs one week ago. Hurt ankle. Still swollen and painful.    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      OBJECTIVE:  Blood pressure 120/77, pulse 79, temperature 97.8  F (36.6  C), temperature source Oral, weight 197 lb (89.4 kg), SpO2 98 %, not currently breastfeeding.  Patient is alert and NAD.  EYES: conjunctiva clear  Ankle Exam (left):  Inspection:swelling around the lateral malleolus  bruising around the lateral malleolus  Palpation:tender over lateral malleolus  Cap refill intact.    Good doralis pedis.  Neurovascularly Intact Distally.     XR no fx/fb- ? Lucent area distal fibula    ASSESSMENT:    ICD-10-CM    1. Ankle injury, left, initial encounter S99.912A XR Ankle Left G/E 3 Views     order for DME   2. Sprain of left ankle, unspecified ligament, initial encounter S93.402A    3. Abnormal x-ray R93.8 order for DME         PLAN:  Will call with  Radiology results  Ankle air walker provided today.   OTC pain control as needed.  Ice, elevate, slowly increase activity level with active range of motion exercises encouraged.    Maria Bennett PA-C

## 2017-11-21 NOTE — MR AVS SNAPSHOT
After Visit Summary   11/21/2017    Holly Muir    MRN: [id number]           Patient Information     Date Of Birth          1955        Visit Information        Provider Department      11/21/2017 5:10 PM Maria Bennett PA-C [facility]        Today's Diagnoses     Ankle injury, left, initial encounter    -  1    Sprain of left ankle, unspecified ligament, initial encounter        Abnormal x-ray           Follow-ups after your visit        Your next 10 appointments already scheduled     Dec 04, 2017 12:50 PM CST   Return Visit with Tiffany King MD PhD   [facility])    [address] [place] [id number]   [phone]            Dec 05, 2017  8:30 AM CST   Return Visit with Myke Becerra MD   [facility])    [address] [place] [id number]   [phone]            Jan 26, 2018 10:30 AM CST   LAB with LAB FIRST FLOOR [facility])    [address] [place] [id number]   [phone]           Please do not eat 10-12 hours before your appointment if you are coming in fasting for labs on lipids, cholesterol, or glucose (sugar). This does not apply to pregnant women. Water, hot tea and black coffee (with nothing added) are okay. Do not drink other fluids, diet soda or chew gum.            Jan 26, 2018 10:50 AM CST   PHYSICAL with Tiffany King MD PhD   [facility])    [address] [place] [address]   [phone]            Feb 09, 2018 11:15 AM CST   Return Visit with Elizabeth Medrano MD, [facility] NURSE   [facility])    [address] [place] [address]   [phone]              Who to contact     If you have questions or need follow up information about today's clinic visit  "or your schedule please contact [facility] directly at [phone].  Normal or non-critical lab and imaging results will be communicated to you by MyChart, letter or phone within 4 business days after the clinic has received the results. If you do not hear from us within 7 days, please contact the clinic through [employer] or phone. If you have a critical or abnormal lab result, we will notify you by phone as soon as possible.  Submit refill requests through [employer] or call your pharmacy and they will forward the refill request to us. Please allow 3 business days for your refill to be completed.          Additional Information About Your Visit        [employer] Information     [employer] lets you send messages to your doctor, view your test results, renew your prescriptions, schedule appointments and more. To sign up, go to [email or web address] . Click on \"Log in\" on the left side of the screen, which will take you to the Welcome page. Then click on \"Sign up Now\" on the right side of the page.     You will be asked to enter the access code listed below, as well as some personal information. Please follow the directions to create your username and password.     Your access code is: [email or web address]  Expires: 2017  9:11 AM     Your access code will  in 90 days. If you need help or a new code, please call your [facility] clinic or [phone].        Care EveryWhere ID     This is your Care EveryWhere ID. This could be used by other organizations to access your [facility] medical records  [id number]        Your Vitals Were     Pulse Temperature Pulse Oximetry BMI (Body Mass Index)          79 97.8  F (36.6  C) (Oral) 98% 36.03 kg/m2         Blood Pressure from Last 3 Encounters:   17 120/77   10/25/17 109/69   17 123/68    Weight from Last 3 Encounters:   17 197 lb (89.4 kg)   10/25/17 195 lb (88.5 kg)   17 189 lb 6.4 oz (85.9 kg)                 Today's Medication Changes        "   These changes are accurate as of: 11/21/17  8:51 PM.  If you have any questions, ask your nurse or doctor.               These medicines have changed or have updated prescriptions.        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 Make sure you understand how and when to take each.   Used for:  S/P colostomy (H)   Changed by:  Tiffany King MD PhD        Equipment being ordered:  Ostomy supplies.  Drain Pouch by Valarie #.    Also needs Barrier by Valarie #.   Quantity:  3 Month   Refills:  4       * order for DME   This may have changed:  You were already taking a medication with the same name, and this prescription was added. Make sure you understand how and when to take each.   Used for:  Ankle injury, left, initial encounter, Abnormal x-ray   Changed by:  Maria Bennett PA-C        by Device route continuous Air walker -  Use as instructed   Quantity:  1 Device   Refills:  0       * Notice:  This list has 2 medication(s) that are the same as other medications prescribed for you. Read the directions carefully, and ask your doctor or other care provider to review them with you.         Where to get your medicines      Some of these will need a paper prescription and others can be bought over the counter.  Ask your nurse if you have questions.     You don't need a prescription for these medications     order for DME                Primary Care Provider Office Phone # Fax #    Tiffany King MD PhD [phone] [address] [address] [place] [address]  [phone]        Goals        General    I will continue to monitor my blood sugars and treat the results as directed. (pt-stated)     Notes - Note created  12/9/2015 10:26 AM by Randy Suarez RN    As of today's date 12/9/2015 goal is met at 0 - 25%.   Goal Status:  Active          Equal Access to Services     KATRIN RIGGS AH: Hadii aad ku hadasho Soomaali, waaxda luqadaha, qaybta kaalmada adeegyada, waxjessica jonoin hayaan derik sanders lacharlottebrenden . So [facility] [phone].    ATENCIÓN: Si habla español, tiene a lopez disposición servicios gratuitos de asistencia lingüística. Llame al [phone].    We comply with applicable federal civil rights laws and [place] laws. We do not discriminate on the basis of race, color, national origin, age, disability, sex, sexual orientation, or gender identity.            Thank you!     Thank you for choosing [facility]  for your care. Our goal is always to provide you with excellent care. Hearing back from our patients is one way we can continue to improve our services. Please take a few minutes to complete the written survey that you may receive in the mail after your visit with us. Thank you!             Your Updated Medication List - Protect others around you: Learn how to safely use, store and throw away your medicines at www.disposemymeds.org.          This list is accurate as of: 11/21/17  8:51 PM.  Always use your most recent med list.                   Brand Name Dispense Instructions for use Diagnosis    ARIPiprazole 20 MG tablet    ABILIFY     Take 1 tablet (20 mg) by mouth daily Prescribed by Dr. Isael Jim[facility] at [facility]    Visual hallucination, Severe recurrent major depressive disorder with psychotic features (H)       B-COMPLEX/B-12 PO      Take 1 tablet by mouth daily.        blood glucose monitoring lancets     9 Box    Use to test blood sugar 8 times daily or as directed.    Type 2 diabetes mellitus, uncontrolled (H)       blood glucose monitoring  test strip    ACCU-CHEK CELIA    250 each    Use to test blood sugars 8 times daily or as directed.    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)       BusPIRone HCl 30 MG Tabs     60 tablet    TAKE 1 TABLET BY MOUTH TWICE A DAY    Anxiety       [facility] ASPIRIN ADULT LOW DOSE 81 MG chewable tablet   Generic drug:  aspirin     90 tablet    TAKE 1 TABLET (81 MG) BY MOUTH DAILY ***OTC- NOT COVERED BY INS***    Uncontrolled diabetes mellitus (H)       enalapril 2.5 MG tablet    VASOTEC    90 tablet    TAKE 1 TABLET BY MOUTH EVERY DAY    Diabetes mellitus with background retinopathy (H)       gabapentin 300 MG capsule    NEURONTIN    360 capsule    1 capsule twice a day during the day and 2 capsules at night time    Diabetic polyneuropathy associated with type 2 diabetes mellitus (H)       ibuprofen 200 MG tablet    ADVIL/MOTRIN     Take 3-4 tablets by mouth every 4 hours as needed Reported on 2/24/2017        insulin aspart 100 UNIT/ML injection    NovoLOG FLEXPEN    30 mL    Take as directed with meals. Total daily dose approx 30 units.    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, with long-term current use of insulin (H)       insulin pen needle 31G X 5 MM     3 each    Use four times 4 day times a day        metFORMIN 1000 MG tablet    GLUCOPHAGE    60 tablet    TAKE 1 TABLET BY MOUTH 2 TIMES DAILY WITH MEALS    Uncontrolled type 2 diabetes mellitus with hyperglycemia, with long-term current use of insulin (H)       Multi-vitamin Tabs tablet   Generic drug:  multivitamin, therapeutic with  minerals      1 TABLET DAILY        nystatin 416311 UNIT/GM Powd    MYCOSTATIN    60 g    Apply 1 dose topically 3 times daily as needed    Skin rash       omeprazole 20 MG CR capsule    priLOSEC    180 capsule    TAKE 2 CAPSULES BY MOUTH DAILY TAKE 30-60 MINUTES BEFORE A MEAL.    Gastroesophageal reflux disease without esophagitis       order for DME     1 Units    Equipment being ordered: Cane    Knee osteoarthritis       * order for DME     3 Month    Equipment being ordered:  Ostomy supplies.  Drain Pouch by Valarie #.    Also needs Barrier by Valarie #.    S/P colostomy (H)       * order for DME     1 Device    by Device route continuous Air walker -  Use as instructed    Ankle injury, left, initial encounter, Abnormal x-ray       PARoxetine 20 MG tablet    PAXIL     TAKE 3 TABLETS AT BEDTIME (Prescribed by Dr. Isael Jiménez at [facility])    Severe major depression with psychotic features (H)       * pioglitazone 30 MG tablet    ACTOS    90 tablet    Take 1 tablet (30 mg) by mouth daily    Uncontrolled type 2 diabetes mellitus with hyperglycemia, with long-term current use of insulin (H), Insulin resistance       * pioglitazone 30 MG tablet    ACTOS    90 tablet    Take 1 tablet (30 mg) by mouth daily    Type 2 diabetes mellitus with hyperglycemia, with long-term current use of insulin (H)       * propranolol 60 MG 24 hr capsule    INDERAL [place]    60 capsule    Take 1 capsule (60 mg) by mouth daily May take a second dose if tremor recurs.    Tremor       * propranolol 60 MG 24 hr capsule    INDERAL [place]    60 capsule    TAKE 1 CAPSULE (60 MG) BY MOUTH 2 TIMES DAILY    Tremor       simvastatin 20 MG tablet    ZOCOR    90 tablet    TAKE 1 TABLET (20 MG) BY MOUTH AT BEDTIME    High cholesterol       SKIN PREP WIPES Misc     1 Bottle    to be used with colostomy care    Colostomy       traMADol 50 MG tablet    ULTRAM    60 tablet    TAKE 1-2 TABLETS BY MOUTH DAILY AS NEEDED FOR MODERATE PAIN     Arthritis of knee, Arthralgia of both knees       Universal Remover Wipes Misc     1 Each    uses to remove barrier from colostomy bag at time of change    Colostomy       vitamin D 1000 UNITS capsule     90 capsule    Take 2,000 to 4,000 Units per day        * Notice:  This list has 6 medication(s) that are the same as other medications prescribed for you. Read the directions carefully, and ask your doctor or other care provider to review them with you.

## 2017-12-04 ENCOUNTER — OFFICE VISIT (OUTPATIENT)
Dept: PEDIATRICS | Facility: CLINIC | Age: 62
End: 2017-12-04
Payer: MEDICARE

## 2017-12-04 VITALS
BODY MASS INDEX: 35.3 KG/M2 | WEIGHT: 193 LBS | TEMPERATURE: 96.6 F | HEART RATE: 80 BPM | OXYGEN SATURATION: 98 % | SYSTOLIC BLOOD PRESSURE: 124 MMHG | DIASTOLIC BLOOD PRESSURE: 82 MMHG

## 2017-12-04 DIAGNOSIS — M17.12 PRIMARY OSTEOARTHRITIS OF LEFT KNEE: Primary | ICD-10-CM

## 2017-12-04 DIAGNOSIS — G89.29 CHRONIC PAIN OF LEFT KNEE: ICD-10-CM

## 2017-12-04 DIAGNOSIS — M25.562 CHRONIC PAIN OF LEFT KNEE: ICD-10-CM

## 2017-12-04 PROCEDURE — 99213 OFFICE O/P EST LOW 20 MIN: CPT | Performed by: INTERNAL MEDICINE

## 2017-12-04 NOTE — MR AVS SNAPSHOT
After Visit Summary   12/4/2017    Holly Muir    MRN: [id number]           Patient Information     Date Of Birth          1955        Visit Information        Provider Department      12/4/2017 12:50 PM Tiffany King MD PhD [facility]        Today's Diagnoses     Primary osteoarthritis of left knee    -  1    Chronic pain of left knee          Care Instructions    Make appointment(s) for:   -- sports medicine appointment for left knee pain.                   Follow-ups after your visit        Additional Services     SPORTS MEDICINE REFERRAL       Your provider has referred you to:  [facility]: [facility] [phone]   [email or web address]    Persistent left knee pain for 6 weeks, despite PT, tramadol and NSAIDs. May benefit from steroid injection.     Please be aware that coverage of these services is subject to the terms and limitations of your health insurance plan.  Call member services at your health plan with any benefit or coverage questions.      Please bring the following to your appointment:    >>   Any x-rays, CTs or MRIs which have been performed.  Contact the facility where they were done to arrange for  prior to your scheduled appointment.    >>   List of current medications   >>   This referral request   >>   Any documents/labs given to you for this referral                  Your next 10 appointments already scheduled     Dec 05, 2017  8:30 AM CST   Return Visit with Myke Becerra MD   [facility]    [id number] [place] [id number]   [phone]            Jan 26, 2018 10:30 AM CST   LAB with LAB FIRST FLOOR [facility] [place])    [id number] [place] [id number]   [phone]           Please do not eat 10-12 hours before your appointment if you are coming in fasting  for labs on lipids, cholesterol, or glucose (sugar). This does not apply to pregnant women. Water, hot tea and black coffee (with nothing added) are okay. Do not drink other fluids, diet soda or chew gum.            Jan 26, 2018 10:50 AM CST   PHYSICAL with Tiffany King MD PhD   [facility] ([facility])    [address] [place] [id number] [phone]            Feb 09, 2018 11:15 AM CST   Return Visit with Elizabeth Medrano MD, MG ENDO NURSE   [facility] ([facility])    [address] [place] [id number] [phone]              Who to contact     If you have questions or need follow up information about today's clinic visit or your schedule please contact [facility] directly at [phone].  Normal or non-critical lab and imaging results will be communicated to you by MyChart, letter or phone within 4 business days after the clinic has received the results. If you do not hear from us within 7 days, please contact the clinic through [employer] or phone. If you have a critical or abnormal lab result, we will notify you by phone as soon as possible.  Submit refill requests through [employer] or call your pharmacy and they will forward the refill request to us. Please allow 3 business days for your refill to be completed.          Additional Information About Your Visit        Anevia Information     [employer]via is an electronic gateway that provides easy, online access to your medical records. With [employer]nevia, you can request a clinic appointment, read your test results, renew a prescription or communicate with your care team.     To sign up for [employer] visit the website at [email or web address]   You will be asked to enter the access code listed below, as well as some personal information. Please follow the directions to create your username and password.     Your access code is: [email or web address]  Expires:  2017  9:11 AM     Your access code will  in 90 days. If you need help or a new code, please contact your [facility] or call [phone] for assistance.        Care EveryWhere ID     This is your Care EveryWhere ID. This could be used by other organizations to access your [place] medical records  [id number]        Your Vitals Were     Pulse Temperature Pulse Oximetry BMI (Body Mass Index)          80 96.6  F (35.9  C) (Temporal) 98% 35.3 kg/m2         Blood Pressure from Last 3 Encounters:   17 124/82   17 120/77   10/25/17 109/69    Weight from Last 3 Encounters:   17 193 lb (87.5 kg)   17 197 lb (89.4 kg)   10/25/17 195 lb (88.5 kg)              We Performed the Following     SPORTS MEDICINE REFERRAL          Today's Medication Changes          These changes are accurate as of: 17  1:49 PM.  If you have any questions, ask your nurse or doctor.               These medicines have changed or have updated prescriptions.        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 (pt-stated)     Notes - Note created  2015 10:26 AM by Randy Suarez RN    As of today's date 2015 goal is met at 0 - 25%.   Goal Status:  Active          Equal Access to Services     KATRIN RIGGS AH: Hadii dharmesh Caban, treasure odonnell, abi rosado,  kim rocabrenden rosales'aan ah. So [facility] [phone].    ATENCIÓN: Si shayleela marcela, tiene a lopez disposición servicios gratuitos de asistencia lingüística. Garrick lima [phone].    We comply with applicable federal civil rights laws and [place] laws. We do not discriminate on the basis of race, color, national origin, age, disability, sex, sexual orientation, or gender identity.            Thank you!     Thank you for choosing [facility]  for your care. Our goal is always to provide you with excellent care. Hearing back from our patients is one way we can continue to improve our services. Please take a few minutes to complete the written survey that you may receive in the mail after your visit with us. Thank you!             Your Updated Medication List - Protect others around you: Learn how to safely use, store and throw away your medicines at www.disposemymeds.org.          This list is accurate as of: 12/4/17  1:49 PM.  Always use your most recent med list.                   Brand Name Dispense Instructions for use Diagnosis    ARIPiprazole 20 MG tablet    ABILIFY     Take 1 tablet (20 mg) by mouth daily Prescribed by Dr. Isael Jim[facility]nez at [facility]    Visual hallucination, Severe recurrent major depressive disorder with psychotic features (H)       B-COMPLEX/B-12 PO      Take 1 tablet by mouth daily.        blood glucose monitoring lancets     9 Box    Use to test blood sugar 8 times daily or as directed.    Type 2 diabetes mellitus, uncontrolled (H)       blood glucose monitoring test strip    ACCU-CHEK CELIA    250 each    Use to test blood sugars 8 times daily or as directed.    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)       BusPIRone HCl 30 MG Tabs     60 tablet    TAKE  1 TABLET BY MOUTH TWICE A DAY    Anxiety       [facility] ASPIRIN ADULT LOW DOSE 81 MG chewable tablet   Generic drug:  aspirin     90 tablet    TAKE 1 TABLET (81 MG) BY MOUTH DAILY ***OTC- NOT COVERED BY INS***    Uncontrolled diabetes mellitus (H)       enalapril 2.5 MG tablet    VASOTEC    90 tablet    TAKE 1 TABLET BY MOUTH EVERY DAY    Diabetes mellitus with background retinopathy (H)       gabapentin 300 MG capsule    NEURONTIN    360 capsule    1 capsule twice a day during the day and 2 capsules at night time    Diabetic polyneuropathy associated with type 2 diabetes mellitus (H)       ibuprofen 200 MG tablet    ADVIL/MOTRIN     Take 3-4 tablets by mouth every 4 hours as needed Reported on 2/24/2017        insulin aspart 100 UNIT/ML injection    NovoLOG FLEXPEN    30 mL    Take as directed with meals. Total daily dose approx 30 units.    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, with long-term current use of insulin (H)       insulin pen needle 31G X 5 MM     3 each    Use four times 4 day times a day        metFORMIN 1000 MG tablet    GLUCOPHAGE    60 tablet    TAKE 1 TABLET BY MOUTH 2 TIMES DAILY WITH MEALS    Uncontrolled type 2 diabetes mellitus with hyperglycemia, with long-term current use of insulin (H)       Multi-vitamin Tabs tablet   Generic drug:  multivitamin, therapeutic with minerals      1 TABLET DAILY        nystatin 785275 UNIT/GM Powd    MYCOSTATIN    60 g    Apply 1 dose topically 3 times daily as needed    Skin rash       omeprazole 20 MG CR capsule    priLOSEC    180 capsule    TAKE 2 CAPSULES BY MOUTH DAILY TAKE 30-60 MINUTES BEFORE A MEAL.    Gastroesophageal reflux disease without esophagitis       order for DME     1 Units    Equipment being ordered: Cane     Knee osteoarthritis       * order for DME     3 Month    Equipment being ordered:  Ostomy supplies.  Drain Pouch by [place] #.    Also needs Barrier by [place] #.    S/P colostomy (H)       * order for DME     1 Device    by Device route continuous Air walker -  Use as instructed    Ankle injury, left, initial encounter, Abnormal x-ray       PARoxetine 20 MG tablet    PAXIL     TAKE 3 TABLETS AT BEDTIME (Prescribed by Dr. Isael Jiménez at [facility])    Severe major depression with psychotic features (H)       * pioglitazone 30 MG tablet    ACTOS    90 tablet    Take 1 tablet (30 mg) by mouth daily    Uncontrolled type 2 diabetes mellitus with hyperglycemia, with long-term current use of insulin (H), Insulin resistance       * pioglitazone 30 MG tablet    ACTOS    90 tablet    Take 1 tablet (30 mg) by mouth daily    Type 2 diabetes mellitus with hyperglycemia, with long-term current use of insulin (H)       * propranolol 60 MG 24 hr capsule    INDERAL LA    60 capsule    Take 1 capsule (60 mg) by mouth daily May take a second dose if tremor recurs.    Tremor       * propranolol 60 MG 24 hr capsule    INDERAL LA    60 capsule    TAKE 1 CAPSULE (60 MG) BY MOUTH 2 TIMES DAILY    Tremor       simvastatin 20 MG tablet    ZOCOR    90 tablet    TAKE 1 TABLET (20 MG) BY MOUTH AT BEDTIME    High cholesterol       SKIN PREP WIPES Misc     1 Bottle    to be used with colostomy care    Colostomy       traMADol 50 MG tablet    ULTRAM    60 tablet    TAKE 1-2 TABLETS BY MOUTH DAILY AS NEEDED FOR MODERATE PAIN    Arthritis of knee, Arthralgia of both knees       Universal Remover Wipes Misc     1 Each    uses to remove barrier from colostomy bag at time of change    Colostomy       vitamin D 1000 UNITS capsule     90 capsule    Take 2,000 to 4,000 Units per day        * Notice:  This list has 6 medication(s) that are the same as other medications prescribed for you. Read the directions carefully, and ask  your doctor or other care provider to review them with you.

## 2017-12-04 NOTE — PROGRESS NOTES
SUBJECTIVE:   Holly Muir is a 62 year old female who presents to clinic today for the following health issues:      Left knee and leg swelling, onset Oct 2017.  Seen at [facility].   Also Left ankle injury with swelling. 2 weeks ago. Still having pain and swelling. Seen at [facility].      Holly has Severe major depression with psychotic features (H); History of cervical cancer; S/P colostomy (H); Memory loss; Type 2 diabetes mellitus, uncontrolled A1C goal <8%; HTN, goal below 140/90; and Encounter for long-term (current) use of insulin (H) on her pertinent problem list.    She was seen in [facility] emergency room on October 28 for acute onset left knee pain.  Her x-rays show no acute fracture, only evidence of prior patella fracture.  Diagnosed with either knee sprain or osteoarthritis.  Conservative management with sports medicine follow-up, OTC analgesics.  Patient continues to experience pain in the left knee.  Patient reports history of left knee arthroscopic surgery several years ago.  Has always had some chronic left knee pain.  In the last couple months it seems to be worse than her baseline.  No known new injuries.    Last month she  had an injury of the left ankle, was seen in urgent care.  X-ray did not show fracture.  The ankle is much improved.      Current Outpatient Prescriptions on File Prior to Visit:  enalapril (VASOTEC) 2.5 MG tablet TAKE 1 TABLET BY MOUTH EVERY DAY   traMADol (ULTRAM) 50 MG tablet TAKE 1-2 TABLETS BY MOUTH DAILY AS NEEDED FOR MODERATE PAIN   metFORMIN (GLUCOPHAGE) 1000 MG tablet TAKE 1 TABLET BY MOUTH 2 TIMES DAILY WITH MEALS   insulin aspart (NOVOLOG FLEXPEN) 100 UNIT/ML injection Take as directed with meals. Total daily dose approx 30 units.   omeprazole (PRILOSEC) 20 MG CR capsule TAKE 2 CAPSULES BY MOUTH DAILY TAKE 30-60 MINUTES BEFORE A MEAL.   gabapentin (NEURONTIN) 300 MG capsule 1 capsule twice a day during the day and 2 capsules at night time   ARIPiprazole (ABILIFY) 20  MG tablet Take 1 tablet (20 mg) by mouth daily Prescribed by Dr. Isael Jiménez at [facility]   buPROPion (WELLBUTRIN XL) 150 MG 24 hr tablet TAKE 3 TABLETS (450 MG) BY MOUTH EVERY MORNING   PARoxetine (PAXIL) 20 MG tablet TAKE 3 TABLETS AT BEDTIME (Prescribed by Dr. Isael Jiménez at [facility])   CVS ASPIRIN ADULT LOW DOSE 81 MG chewable tablet TAKE 1 TABLET (81 MG) BY MOUTH DAILY OTC   pioglitazone (ACTOS) 30 MG tablet Take 1 tablet (30 mg) by mouth daily   insulin glargine (LANTUS SOLOSTAR) 100 UNIT/ML injection Inject 30 Units Subcutaneous At Bedtime (Patient taking differently: Inject 48 Units Subcutaneous At Bedtime )   BusPIRone HCl 30 MG TABS TAKE 1 TABLET BY MOUTH TWICE A DAY   simvastatin (ZOCOR) 20 MG tablet TAKE 1 TABLET (20 MG) BY MOUTH AT BEDTIME   nystatin (MYCOSTATIN) 186127 UNIT/GM POWD Apply 1 dose topically 3 times daily as needed   Cholecalciferol (VITAMIN D) 1000 UNITS capsule Take 2,000 to 4,000 Units per day   B Complex Vitamins (B-COMPLEX/B-12 PO) Take 1 tablet by mouth daily.    MULTI-VITAMIN OR TABS 1 TABLET DAILY   order for DME by Device route continuous Air walker -  Use as instructed   insulin pen needle 31G X 5 MM Use four times 4 day times a day   blood glucose monitoring (ACCU-CHEK FASTCLIX) lancets Use to test blood sugar 8 times daily or as directed.   propranolol (INDERAL LA) 60 MG capsule Take 1 capsule (60 mg) by mouth daily May take a second dose if tremor recurs.   blood glucose monitoring (ACCU-CHEK CELIA) test strip Use to test blood sugars 8 times daily or as directed.   ORDER FOR DME Equipment being ordered:  Ostomy supplies.  Drain Pouch by [place] #.    Also needs Barrier by [employer] #.   ORDER FOR DME Equipment being ordered: Cane   ibuprofen (ADVIL,MOTRIN) 200 MG tablet Take 3-4 tablets by mouth every 4 hours as needed Reported on 2/24/2017   UNIVERSAL REMOVER WIPES EX MISC uses to remove barrier from colostomy bag at time of change    SKIN PREP WIPES MISC to be used with colostomy care     No current facility-administered medications on file prior to visit.       Problem list, Medication list, Allergies, and Medical/Social/Surgical histories reviewed in [facility] and updated as appropriate.    OBJECTIVE:                                                    /82  Pulse 80  Temp 96.6  F (35.9  C) (Temporal)  Wt 193 lb (87.5 kg)  SpO2 98%  BMI 35.3 kg/m2    GEN: healthy, alert and no distress  Left knee: There is tenderness to palpation over the patella area.  No specific joint line tenderness.  There appears to be some swelling of the left knee.  No erythema.  Range of motion is grossly intact.       Diagnostic test results:  No results found for this or any previous visit (from the past 24 hour(s)).       ASSESSMENT/PLAN:                                                      62 year old female with the following diagnoses and treatment plan:      ICD-10-CM    1. Primary osteoarthritis of left knee M17.12    2. Chronic pain of left knee M25.562 SPORTS MEDICINE REFERRAL    G89.29        --Chronic left knee pain, likely a component of osteoarthritis.  She may benefit from cortisone injection.  Refer patient to sports medicine for evaluation.      Will call or return to clinic if worsening or symptoms not improving as discussed.  See Patient Instructions.        Tiffany King MD-PhD  [facility]    (Note: Chart documentation was done in part with Dragon Voice Recognition software. Although reviewed after completion, some word and grammatical errors may remain.)

## 2017-12-04 NOTE — NURSING NOTE
"Chief Complaint   Patient presents with     Swelling       Initial /82  Pulse 80  Temp 96.6  F (35.9  C) (Temporal)  Wt 193 lb (87.5 kg)  SpO2 98%  BMI 35.3 kg/m2 Estimated body mass index is 35.3 kg/(m^2) as calculated from the following:    Height as of 9/28/17: 5' 2\" (1.575 m).    Weight as of this encounter: 193 lb (87.5 kg).  Medication Reconciliation: complete    "

## 2017-12-05 ENCOUNTER — OFFICE VISIT (OUTPATIENT)
Dept: NEUROLOGY | Facility: CLINIC | Age: 62
End: 2017-12-05
Payer: MEDICARE

## 2017-12-05 VITALS
DIASTOLIC BLOOD PRESSURE: 67 MMHG | HEART RATE: 70 BPM | OXYGEN SATURATION: 99 % | SYSTOLIC BLOOD PRESSURE: 126 MMHG | BODY MASS INDEX: 35.53 KG/M2 | HEIGHT: 62 IN | WEIGHT: 193.1 LBS

## 2017-12-05 DIAGNOSIS — R25.1 TREMOR: ICD-10-CM

## 2017-12-05 PROCEDURE — 99213 OFFICE O/P EST LOW 20 MIN: CPT | Performed by: PSYCHIATRY & NEUROLOGY

## 2017-12-05 RX ORDER — PROPRANOLOL HCL 60 MG
60 CAPSULE, EXTENDED RELEASE 24HR ORAL SEE ADMIN INSTRUCTIONS
Qty: 60 CAPSULE | Refills: 11 | Status: SHIPPED | OUTPATIENT
Start: 2017-12-05 | End: 2019-01-18

## 2017-12-05 ASSESSMENT — PAIN SCALES - GENERAL: PAINLEVEL: NO PAIN (0)

## 2017-12-05 NOTE — NURSING NOTE
"Holly Muir's goals for this visit include: return  She requests these members of her care team be copied on today's visit information:     PCP: Tiffany King    Referring Provider:  No referring provider defined for this encounter.    Chief Complaint   Patient presents with     RECHECK       Initial /67  Pulse 70  Ht 1.575 m (5' 2\")  Wt 87.6 kg (193 lb 1.6 oz)  SpO2 99%  BMI 35.32 kg/m2 Estimated body mass index is 35.32 kg/(m^2) as calculated from the following:    Height as of this encounter: 1.575 m (5' 2\").    Weight as of this encounter: 87.6 kg (193 lb 1.6 oz).  Medication Reconciliation: complete    Do you need any medication refills at today's visit? y  "

## 2017-12-05 NOTE — PATIENT INSTRUCTIONS
Thank you for choosing [facility]. It was a pleasure to see you for your office visit today.     The following is a summary of your office visit:    Appointments Made today:NONE    Nurse/clinic contact information: [facility] [phone]    Further instructions for your care: Call if any further questions or concerns.        If -you had any blood work, imaging or other test we will be in touch regarding the results. If there is anything abnormal you will be contacted by phone and if the results are normal you will receive a letter in the mail. If you have any questions or concerns please contact us at [phone].

## 2017-12-05 NOTE — MR AVS SNAPSHOT
After Visit Summary   12/5/2017    Holly Muir    MRN: [id number]           Patient Information     Date Of Birth          1955        Visit Information        Provider Department      12/5/2017 8:30 AM Myke Becerra MD [facility]        Today's Diagnoses     Tremor          Care Instructions    Thank you for choosing [facility]. It was a pleasure to see you for your office visit today.     The following is a summary of your office visit:    Appointments Made today:NONE    Nurse/clinic contact information: [facility] [phone]    Further instructions for your care: Call if any further questions or concerns.        If -you had any blood work, imaging or other test we will be in touch regarding the results. If there is anything abnormal you will be contacted by phone and if the results are normal you will receive a letter in the mail. If you have any questions or concerns please contact us at [phone].                   Follow-ups after your visit        Follow-up notes from your care team     Return if symptoms worsen or fail to improve.      Your next 10 appointments already scheduled     Dec 08, 2017 11:20 AM CST   New Visit with Santy Harrington DO   [facility] ([facility])    [address] [place] [address] [phone]            Jan 26, 2018 10:30 AM CST   LAB with LAB FIRST FLOOR [facility])    [address] [place] [address] [phone]           Please do not eat 10-12 hours before your appointment if you are coming in fasting for labs on lipids, cholesterol, or glucose (sugar). This does not apply to pregnant women. Water, hot tea and black coffee (with nothing added) are okay. Do not drink other fluids, diet soda or chew gum.            Jan 26, 2018 10:50 AM CST   PHYSICAL with Tiffany King MD  PhD   [facility]    [address] [address] [place] [id number] [phone]            2018 11:15 AM CST   Return Visit with Elizabeth Medrano MD, MG ENDO NURSE   [facility]    [address] [place] [id number] [phone]              Who to contact     If you have questions or need follow up information about today's clinic visit or your schedule please contact [facility] directly at [phone].  Normal or non-critical lab and imaging results will be communicated to you by MyChart, letter or phone within 4 business days after the clinic has received the results. If you do not hear from us within 7 days, please contact the clinic through Monkimunhart or phone. If you have a critical or abnormal lab result, we will notify you by phone as soon as possible.  Submit refill requests through MeUndies or call your pharmacy and they will forward the refill request to us. Please allow 3 business days for your refill to be completed.          Additional Information About Your Visit        MeUndies Information     MeUndies is an electronic gateway that provides easy, online access to your medical records. With MeUndies, you can request a clinic appointment, read your test results, renew a prescription or communicate with your care team.     To sign up for MeUndies visit the website at [email or web address]   You will be asked to enter the access code listed below, as well as some personal information. Please follow the directions to create your username and password.     Your access code is: [email or web address]  Expires: 2017  9:11 AM     Your access code will  in 90 days. If you need help or a new code, please contact your [facility] or call [phone] for assistance.        Care EveryWhere ID     This is your Care EveryWhere ID. This could be used by  "other organizations to access your [place] medical records  [id number]        Your Vitals Were     Pulse Height Pulse Oximetry BMI (Body Mass Index)          70 1.575 m (5' 2\") 99% 35.32 kg/m2         Blood Pressure from Last 3 Encounters:   12/05/17 126/67   12/04/17 124/82   11/21/17 120/77    Weight from Last 3 Encounters:   12/05/17 87.6 kg (193 lb 1.6 oz)   12/04/17 87.5 kg (193 lb)   11/21/17 89.4 kg (197 lb)              Today, you had the following     No orders found for display         Today's Medication Changes          These changes are accurate as of: 12/5/17  8:49 AM.  If you have any questions, ask your nurse or doctor.               These medicines have changed or have updated prescriptions.        Dose/Directions    insulin glargine 100 UNIT/ML injection   Commonly known as:  LANTUS SOLOSTAR   This may have changed:  how much to take   Used for:  Uncontrolled type 2 diabetes mellitus with mild nonproliferative retinopathy without macular edema, with long-term current use of insulin (H)        Dose:  30 Units   Inject 30 Units Subcutaneous At Bedtime   Quantity:  9 mL   Refills:  3       * propranolol 60 MG 24 hr capsule   Commonly known as:  INDERAL LA   This may have changed:  Another medication with the same name was changed. Make sure you understand how and when to take each.   Used for:  Tremor   Changed by:  Myke Becerra MD        Dose:  60 mg   Take 1 capsule (60 mg) by mouth daily May take a second dose if tremor recurs.   Quantity:  60 capsule   Refills:  11       * propranolol 60 MG 24 hr capsule   Commonly known as:  INDERAL LA   This may have changed:  See the new instructions.   Used for:  Tremor   Changed by:  Myke Becerra MD        Dose:  60 mg   Take 1 capsule (60 mg) by mouth See Admin Instructions Take one po q day.  May repeat dose if tremor persists.   Quantity:  60 capsule   Refills:  11       * Notice:  This list has 2 medication(s) that are the same as " other medications prescribed for you. Read the directions carefully, and ask your doctor or other care provider to review them with you.         Where to get your medicines      These medications were sent to [facility]/pharmacy #[id number] - [place], [place] - [address] [place] [address]     Phone:  [phone]     propranolol 60 MG 24 hr capsule                Primary Care Provider Office Phone # Fax #    Tiffany King MD PhD [phone] [address]  [facility] [address]        Goals        General    I will continue to monitor my blood sugars and treat the results as directed. (pt-stated)     Notes - Note created  12/9/2015 10:26 AM by Randy Suarez RN    As of today's date 12/9/2015 goal is met at 0 - 25%.   Goal Status:  Active          Equal Access to Services     [facility]: Hadii aad ku hadasho Soomaali, waaxda luqadaha, qaybta kaalmada adeegyada, waxay jonoin hayaan derik brightaranoman badilloaan . So [facility] [phone].    ATENCIÓN: Si habla español, tiene a lopez disposición servicios gratuitos de asistencia lingüística. Llame al [phone].    We comply with applicable federal civil rights laws and [place] laws. We do not discriminate on the basis of race, color, national origin, age, disability, sex, sexual orientation, or gender identity.            Thank you!     Thank you for choosing [facility]  for your care. Our goal is always to provide you with excellent care. Hearing back from our patients is one way we can continue to improve our services. Please take a few minutes to complete the written survey that you may receive in the mail after your visit with us. Thank you!             Your Updated Medication List - Protect others around you: Learn how to safely use, store and throw away your medicines at www.disposemymeds.org.          This list is accurate as of: 12/5/17  8:49 AM.  Always use your most recent med list.                    Brand Name Dispense Instructions for use Diagnosis    ARIPiprazole 20 MG tablet    ABILIFY     Take 1 tablet (20 mg) by mouth daily Prescribed by Dr. Isael [facility] at [facility]    Visual hallucination, Severe recurrent major depressive disorder with psychotic features (H)       B-COMPLEX/B-12 PO      Take 1 tablet by mouth daily.        blood glucose monitoring lancets     9 Box    Use to test blood sugar 8 times daily or as directed.    Type 2 diabetes mellitus, uncontrolled (H)       blood glucose monitoring test strip    ACCU-CHEK CELIA    250 each    Use to test blood sugars 8 times daily or as directed.    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)       BusPIRone HCl 30 MG Tabs     60 tablet    TAKE 1 TABLET BY MOUTH TWICE A DAY    Anxiety       [facility] ASPIRIN ADULT LOW DOSE 81 MG chewable tablet   Generic drug:  aspirin     90 tablet    TAKE 1 TABLET (81 MG) BY MOUTH DAILY ***OTC- NOT COVERED BY INS***    Uncontrolled diabetes mellitus (H)       enalapril 2.5 MG tablet    VASOTEC    90 tablet    TAKE 1 TABLET BY MOUTH EVERY DAY    Diabetes mellitus with background retinopathy (H)       gabapentin 300 MG capsule    NEURONTIN    360 capsule    1 capsule twice a day during the day and 2 capsules at night time    Diabetic polyneuropathy associated with type 2 diabetes mellitus (H)       ibuprofen 200 MG tablet    ADVIL/MOTRIN     Take 3-4 tablets by mouth every 4 hours as needed Reported on 2/24/2017        insulin aspart 100 UNIT/ML injection    NovoLOG FLEXPEN    30 mL    Take as directed with meals. Total daily dose approx 30 units.    Type 2 diabetes mellitus, uncontrolled, with retinopathy (H)       insulin glargine 100 UNIT/ML injection    LANTUS SOLOSTAR    9 mL    Inject 30 Units  Subcutaneous At Bedtime    Uncontrolled type 2 diabetes mellitus with mild nonproliferative retinopathy without macular edema, with long-term current use of insulin (H)       insulin pen needle 31G X 5 MM     3 each    Use four times 4 day times a day        metFORMIN 1000 MG tablet    GLUCOPHAGE    60 tablet    TAKE 1 TABLET BY MOUTH 2 TIMES DAILY WITH MEALS    Uncontrolled type 2 diabetes mellitus with hyperglycemia, with long-term current use of insulin (H)       Multi-vitamin Tabs tablet   Generic drug:  multivitamin, therapeutic with minerals      1 TABLET DAILY        nystatin [id number] UNIT/GM Powd    MYCOSTATIN    60 g    Apply 1 dose topically 3 times daily as needed    Skin rash       omeprazole 20 MG CR capsule    priLOSEC    180 capsule    TAKE 2 CAPSULES BY MOUTH DAILY TAKE 30-60 MINUTES BEFORE A MEAL.    Gastroesophageal reflux disease without esophagitis       order for DME     1 Units    Equipment being ordered: Cane    Knee osteoarthritis       * order for DME     3 Month    Equipment being ordered:  Ostomy supplies.  Drain Pouch by [employer] #.    Also needs Barrier by [employer] #.    S/P colostomy (H)       * order for DME     1 Device    by Device route continuous Air walker -  Use as instructed    Ankle injury, left, initial encounter, Abnormal x-ray       PARoxetine 20 MG tablet    PAXIL     TAKE 3 TABLETS AT BEDTIME (Prescribed by Dr. Isael Jim[facility]nez at [facility])    Severe major depression with psychotic features (H)       pioglitazone 30 MG tablet    ACTOS    90 tablet    Take 1 tablet (30 mg) by mouth daily    Uncontrolled type 2 diabetes mellitus with hyperglycemia, with long-term current use of insulin (H), Insulin resistance       * propranolol 60 MG 24 hr capsule    INDERAL LA    60 capsule    Take 1 capsule (60 mg) by mouth daily May take a second dose if tremor recurs.    Tremor       * propranolol 60 MG 24 hr capsule    INDERAL LA    60 capsule    Take 1 capsule (60  mg) by mouth See Admin Instructions Take one po q day.  May repeat dose if tremor persists.    Tremor       simvastatin 20 MG tablet    ZOCOR    90 tablet    TAKE 1 TABLET (20 MG) BY MOUTH AT BEDTIME    High cholesterol       SKIN PREP WIPES Misc     1 Bottle    to be used with colostomy care    Colostomy       traMADol 50 MG tablet    ULTRAM    60 tablet    TAKE 1-2 TABLETS BY MOUTH DAILY AS NEEDED FOR MODERATE PAIN    Arthritis of knee, Arthralgia of both knees       Universal Remover Wipes Misc     1 Each    uses to remove barrier from colostomy bag at time of change    Colostomy       vitamin D 1000 UNITS capsule     90 capsule    Take 2,000 to 4,000 Units per day        * Notice:  This list has 4 medication(s) that are the same as other medications prescribed for you. Read the directions carefully, and ask your doctor or other care provider to review them with you.

## 2017-12-05 NOTE — LETTER
2017        RE: Holly Feldman  [address] [facility] [address]        NEUROLOGY CONSULTATION      PRIMARY CARE PHYSICIAN:  Tiffany King MD       HISTORY OF PRESENT ILLNESS:  Holly Feldman is seen in followup with tremor.  I last saw the patient over a year ago.  Her tremor has mixed features.  It likely relates to her medication regimen.  She is on paroxetine, aripiprazole, bupropion, buspirone, gabapentin and tramadol as needed.  For the tremor, she has been on propranolol 60 mg either once or twice a day.  That has had an excellent effect on reducing and even eliminating the tremor.  She is here mainly for refills.      PHYSICAL EXAMINATION:   GENERAL:  The patient is cooperative and in no distress.   VITAL SIGNS:  Her blood pressure is 126/67 with a heart rate of 70.   NEUROLOGIC:  Reproduction of a spiral shows only a slight trace of tremor.  Her handwriting sample was easily legible.  I do not detect any tremor on her exam today.      ASSESSMENT:   1.  Tremor, mixed type, likely related to medications.   2.  Probable diabetic neuropathy.      DISCUSSION:  The patient is seen with the above problem list.  With regard to the tremor, I have given her a refill on the propranolol to take 60 mg a day and repeat the dose if the tremor persists.  I have given her a 1-year supply.  Further refills can be through primary care.  I can see her on an as needed basis.      In addition, she has symptoms of numbness in the feet likely related to her diabetes.  She says this is stable and not worsening.  Symptom will be monitored.  Neurologic followup will be on an as needed basis.         ALEJANDRA SMITH MD             D: 2017 08:52   T: 2017 11:16   MT: TS      Name:     HOLLY FELDMAN   MRN:      [id number]        Account:      [id number]   :      1955           Visit Date:   2017      Document: [id number]       cc: Tiffany King MD         Sincerely,        Alejandra Bro  MD Mariam

## 2017-12-05 NOTE — PROGRESS NOTES
NEUROLOGY CONSULTATION      PRIMARY CARE PHYSICIAN:  Tiffany King MD       HISTORY OF PRESENT ILLNESS:  Holly Muir is seen in followup with tremor.  I last saw the patient over a year ago.  Her tremor has mixed features.  It likely relates to her medication regimen.  She is on paroxetine, aripiprazole, bupropion, buspirone, gabapentin and tramadol as needed.  For the tremor, she has been on propranolol 60 mg either once or twice a day.  That has had an excellent effect on reducing and even eliminating the tremor.  She is here mainly for refills.      PHYSICAL EXAMINATION:   GENERAL:  The patient is cooperative and in no distress.   VITAL SIGNS:  Her blood pressure is 126/67 with a heart rate of 70.   NEUROLOGIC:  Reproduction of a spiral shows only a slight trace of tremor.  Her handwriting sample was easily legible.  I do not detect any tremor on her exam today.      ASSESSMENT:   1.  Tremor, mixed type, likely related to medications.   2.  Probable diabetic neuropathy.      DISCUSSION:  The patient is seen with the above problem list.  With regard to the tremor, I have given her a refill on the propranolol to take 60 mg a day and repeat the dose if the tremor persists.  I have given her a 1-year supply.  Further refills can be through primary care.  I can see her on an as needed basis.      In addition, she has symptoms of numbness in the feet likely related to her diabetes.  She says this is stable and not worsening.  Symptom will be monitored.  Neurologic followup will be on an as needed basis.         ALEJANDRA SMITH MD             D: 2017 08:52   T: 2017 11:16   MT: TS      Name:     HOLLY MUIR   MRN:      [id number]        Account:      [id number]   :      1955           Visit Date:   2017      Document: [id number]       cc: Tiffany King MD

## 2017-12-08 ENCOUNTER — OFFICE VISIT (OUTPATIENT)
Dept: ORTHOPEDICS | Facility: CLINIC | Age: 62
End: 2017-12-08
Attending: INTERNAL MEDICINE
Payer: MEDICARE

## 2017-12-08 ENCOUNTER — TELEPHONE (OUTPATIENT)
Dept: ENDOCRINOLOGY | Facility: CLINIC | Age: 62
End: 2017-12-08

## 2017-12-08 VITALS
BODY MASS INDEX: 34.99 KG/M2 | DIASTOLIC BLOOD PRESSURE: 70 MMHG | SYSTOLIC BLOOD PRESSURE: 113 MMHG | OXYGEN SATURATION: 96 % | HEART RATE: 69 BPM | HEIGHT: 63 IN | WEIGHT: 197.5 LBS

## 2017-12-08 DIAGNOSIS — M17.12 PRIMARY OSTEOARTHRITIS OF LEFT KNEE: Primary | ICD-10-CM

## 2017-12-08 PROCEDURE — 20610 DRAIN/INJ JOINT/BURSA W/O US: CPT | Mod: LT | Performed by: FAMILY MEDICINE

## 2017-12-08 RX ORDER — TRIAMCINOLONE ACETONIDE 40 MG/ML
40 INJECTION, SUSPENSION INTRA-ARTICULAR; INTRAMUSCULAR ONCE
Qty: 1 ML | Refills: 0 | OUTPATIENT
Start: 2017-12-08 | End: 2017-12-08

## 2017-12-08 NOTE — NURSING NOTE
"Holly Muir's goals for this visit include: injection left knee   She requests these members of her care team be copied on today's visit information: no    PCP: Tiffany King    Referring Provider:  Tiffany King MD PhD  [address] [place] [address]    Chief Complaint   Patient presents with     Consult       Initial /70  Pulse 69  Ht 1.6 m (5' 2.99\")  Wt 89.6 kg (197 lb 8 oz)  SpO2 96%  BMI 34.99 kg/m2 Estimated body mass index is 34.99 kg/(m^2) as calculated from the following:    Height as of this encounter: 1.6 m (5' 2.99\").    Weight as of this encounter: 89.6 kg (197 lb 8 oz).  Medication Reconciliation: complete            "

## 2017-12-08 NOTE — PROGRESS NOTES
CHIEF COMPLAINT:  Consult (Chronic knee pain. Xray on 10/28 [facility] )       HISTORY OF PRESENT ILLNESS  Ms. Muir is a pleasant 62 year old year old female who presents to clinic today with left knee pain.  Holly is seen at the request of Dr. King.    Holly has had left knee pain for the past few years, worse with activity, worse the more she is on her feet.  Does swell at times, she was seen recently at [facility] toward the end of October and was diagnosed with osteoarthritis after an x-ray.  Feels slightly better today but still painful with every step.    She does have a remote injury to her left knee, she fell down a set of stairs many years ago and had to have surgery in her left knee for a fracture in her tibia.  She has a large longitudinal scar inferior to the knee.  She did well for some time and feels that this pain may be unrelated.    Additional history: as documented    MEDICAL HISTORY  Patient Active Problem List   Diagnosis     Hyperlipidemia LDL goal <100     Severe major depression with psychotic features (H)     History of cervical cancer     S/P colostomy (H)     Mixed incontinence urge and stress     Memory loss     Type 2 diabetes mellitus, uncontrolled A1C goal <8%     Obesity     Pain in joint, hand     Trigger finger, acquired     Synovitis and tenosynovitis     Dermatochalasis     Presbyopia     OCD (obsessive compulsive disorder)     HTN, goal below 140/90     Cataracts, both eyes     Health Care Home     Lumbago     Sprain of lumbar region     Generalized osteoarthrosis, unspecified site     Rotator cuff impingement syndrome     Hypoglycemia due to insulin     Encounter for long-term (current) use of insulin (H)     Colostomy, evaluate (H)     Urinary retention     ACP (advance care planning)     Bilateral low back pain without sciatica     Anxiety     Fecal incontinence due to anorectal disorder     Visual hallucination     Type 2 diabetes mellitus with hyperglycemia (H)           Allergies  "  Allergen Reactions     Morphine Sulfate Itching       Family History   Problem Relation Age of Onset     DIABETES Mother      CEREBROVASCULAR DISEASE Mother      DIABETES Father      Hypertension Father      CANCER Maternal Grandfather      CANCER Paternal Grandfather      DIABETES Brother      DIABETES Sister      Thyroid Disease Daughter      Thyroid Disease Sister      Multiple Sclerosis Daughter      Glaucoma No family hx of      Macular Degeneration No family hx of        Additional medical/Social/Surgical histories reviewed in [facility] and updated as appropriate.     REVIEW OF SYSTEMS (12/8/2017)  CONSTITUTIONAL: Denies fever and weight loss  EYES: Denies acute vision changes  ENT: Denies hearing changes or difficulty swallowing  CARDIAC: Denies chest pain or edema  RESPIRATORY: Denies dyspnea, cough or wheeze  GASTROINTESTINAL: Denies abdominal pain, nausea, vomiting  MUSCULOSKELETAL: See HPI  SKIN: Denies any recent rash or lesion  NEUROLOGICAL: Denies numbness or focal weakness  PSYCHIATRIC: No history of psychiatric symptoms or problems  Lab Results   Component Value Date    A1C 10.9 09/28/2017    A1C 12.0 05/26/2017    A1C 10.9 12/19/2016    A1C 10.8 05/25/2016    A1C 10.6 03/21/2016     HEMATOLOGY: Denies episodes of easy bleeding      PHYSICAL EXAM  Vitals:    12/08/17 1055   BP: 113/70   Pulse: 69   SpO2: 96%   Weight: 89.6 kg (197 lb 8 oz)   Height: 1.6 m (5' 2.99\")     General  - normal appearance, in no obvious distress  CV  - normal popliteal pulse  Pulm  - normal respiratory pattern, non-labored  Musculoskeletal - left knee  - stance: mildly antalgic gait, genu varum  - inspection: generalized swelling, trace effusion  - palpation: medial joint line tenderness, patella and patellar tendon non-tender, normal popliteal pulse  - ROM: 100 degrees flexion, 0 degrees extension, painful active ROM  - strength: 5/5 in flexion, 5/5 in extension  - neuro: no sensory or motor deficit  Neuro  - no sensory or " motor deficit, grossly normal coordination, normal muscle tone  Skin  - no ecchymosis, erythema, warmth, or induration, no obvious rash  Psych  - interactive, appropriate, normal mood and affect       ASSESSMENT & PLAN  Ms. Muir is a 62 year old year old female who presents to clinic today with left knee osteoarthritis.    I reviewed her knee x-ray reports from care everywhere, the images were not available.  This does reveal generalized osteoarthritis.    Holly and I had a good discussion centering around the spectrum of treatment options for osteoarthritis that preclude surgery.  We discussed nonoperative treatment with pain relievers, icing, low impact exercise, and weight loss.  We also talked about the role of injection therapy with corticosteroids.    I injected her knee today (see procedure note).    We discussed management moving forward with low impact exercise, NSAIDs as needed, and icing after exercise.    Holly can follow with me as needed or if worsening in the future.    Thank you for allowing me to participate in Holly's care.    PROCEDURE    Knee Injection - Intraarticular  The patient was informed of the risks and the benefits of the procedure and a written consent was signed.  The patient s left knee was prepped with chlorhexidine in sterile fashion.   40 mg of triamcinolone suspension was drawn up into a 5 mL syringe with 2 mL of 1% lidocaine and 2 mL of 0.5% marcaine.  Injection was performed using substerile technique.  A 1.5-inch 25-gauge needle was used to enter the lateral aspect of the left knee.  Injection performed successfully without difficulty.  There were no complications. The patient tolerated the procedure well. There was negligible bleeding.   The patient was instructed to ice the knee upon leaving clinic and refrain from overuse over the next 3 days.   The patient was instructed to call or go to the emergency room with any unusual pain, swelling, redness, or if otherwise  concerned.  A follow up appointment will be scheduled to evaluate response to the injection, and to assess range of motion and pain.  Kenalog: NDC 5801-1779-99      Santy Harrington DO, LANA  Primary Care Sports Medicine

## 2017-12-08 NOTE — TELEPHONE ENCOUNTER
Spoke with pt. States she received a cortisone injection in her knee today around 11am. Fasting bg this morn was 255, post lunch b. Pt currently taking 44 units lantus every night. Pt advised to increase to 47 units tonight , sat and sun nights. Please call back on Mon if bg continues to be elevated or starts to drop low. Pt verbalized understanding.     Mikayla Sellers RN, BSN, CDE   [facility]

## 2017-12-08 NOTE — PATIENT INSTRUCTIONS
Thanks for coming today.  [facility] Clinic  [address] [place] [address]    To schedule future appointments in Ortho Clinic, you may call [phone].    To schedule ordered imaging by your provider:   Call Central Imaging [phone]    To schedule an injection ordered by your provider:  Call Central Imaging Injection scheduling line: [phone]  [employer] available online at:  [email or web address]    Please call if any further questions or concerns ([phone]).  Clinic hours 8 am to 5 pm.    Return to clinic (call) if symptoms worsen or fail to improve.

## 2017-12-12 DIAGNOSIS — R25.1 TREMOR: ICD-10-CM

## 2017-12-12 RX ORDER — PROPRANOLOL HCL 60 MG
CAPSULE, EXTENDED RELEASE 24HR ORAL
Qty: 60 CAPSULE | Refills: 0 | Status: SHIPPED | OUTPATIENT
Start: 2017-12-12 | End: 2018-01-04

## 2017-12-21 ENCOUNTER — DOCUMENTATION ONLY (OUTPATIENT)
Dept: CARE COORDINATION | Facility: CLINIC | Age: 62
End: 2017-12-21

## 2017-12-21 DIAGNOSIS — M17.10 ARTHRITIS OF KNEE: ICD-10-CM

## 2017-12-21 DIAGNOSIS — M25.561 ARTHRALGIA OF BOTH KNEES: ICD-10-CM

## 2017-12-21 DIAGNOSIS — M25.562 ARTHRALGIA OF BOTH KNEES: ICD-10-CM

## 2017-12-22 RX ORDER — TRAMADOL HYDROCHLORIDE 50 MG/1
TABLET ORAL
Qty: 60 TABLET | Refills: 0 | Status: SHIPPED | OUTPATIENT
Start: 2017-12-22 | End: 2017-12-28

## 2017-12-22 NOTE — PROGRESS NOTES
Verbal order approval for the requested orders indicated below.     Agreed with nursing recommendation to be evaluated at urgent care/ER.

## 2017-12-22 NOTE — PROGRESS NOTES
Pt reported a fall approximately on 12/16. Pt reported she was going down to living room and missed last step going down pt twisted her ankle. Pt reports that she did not go in to see MD as when she did this last time they gave her a CAM boot which she has been wearing since recent fall now but not at time of fall. Reports swelling improved since initial injury.Upon assessment of her ankle the outer ankle has moderate swelling and bruising noted the inner ankle area and around back of achilles area noted to have small/mod bruising, also noted to have bruising between her toes. Educated pt on going to Urgent Care/ER for eval as there is a possibiity of broken bone. Pt able to wiggle her toes but this causes severe pain, CMS intact. Pt reports she will go when family comes home for eval.      MD Summary-Michelle  Pt is a 62 yr old female who continues with [facility] for Disease Management/Education and assistance with weekly med set up and assessment of compliance and refills. She currently resides in a single family home with her daughter/son in law and grandchildren. Family assists PRN, client cares for her grandchildren most days. She takes medical transport to all appointments or family will take if she is not able to arrange transport. She has been bringing after visit summary from appointments to nurse for medicaiton reconciliation and care coordination. Primary medical diagnosis is Severe major depression with psychotic features, pt also suffers from occasional memory loss and is Type 2 diabetic.  Pt has ostomy which she manages independently, pt also has implant  which has resulted in her remaining continent of urine. She reports chronic back pain but this has been well controlled pt today reports falling approximately around 12/16 she was going down stairs to living room and missed stepped the last step. Pt twisted her left ankle. Patient was wearing a cam boot that she had from previous  fall and stated she did not go in d/t she felt that was all they would reccommend. Upon assessment of her ankle the outer ankle has moderate swelling and bruising noted the inner ankle area and around back of achilles area noted to have small/mod bruising, also noted to have bruising between her toes. Educated pt on going to Urgent Care/ER for eval as there is a possibiity of broken bone. Pt able to wiggle her toes but this causes severe pain, CMS intact. Pt reports she will go when family comes home for eval. Over the last 60 days her chronic back pain has been well managed with tramadol and massage. Alert and orientated x 3 with forgetfulness, speech is clear, she is pleasant and cooperative with in home nurse visits. She has been taking medications as set up by nurse, with an occasional missed dose, this has improved significantly with homecare involvement. She occasionally goes out of town with friends and has had improved med compliance during her outings with the teaching provided on importance of taking all medications as set by nurse.  Pt is following Psych doctor at [facility] and  [facility]. Pt is also seeing a therapist and pt states this has helped with depression. Education on ADA diet and carb counting, pt intermittently provides a food diary,  she has not been documenting her blood glucose readings because they are recorded in her blood glucose monitor. She follows up with endocrinology  as directed and her DM is being managed by DM education.  Client has verbalized awareness of elevated blood sugars due to poor food choices.     BACKGROUND//Primary medical diagnosis is Severe major depression with psychotic features, pt also suffers from occasional memory loss and is Type 2 diabetic.  ANALYSIS// High risk for hospitalization r/t poorly controlled diabetes and history of noncompliance/forgetfulness with taking high risk medications correctly.  She will require skilled interventions to manage her health at  home. RECOMMENDATION// Recommend SN visits for Medication management/education, pt/CG education on ADA diet, skin integrity assessments/teaching.  Chronic pain management/education. She will require skilled interventions to manage her health at home. Expected length of home care is  ongoing HC need for med mgmt    Recert orders for 1xwkx9 and 6 PRN    Please call with any questions.  Fabiola Story Rn  [phone]

## 2017-12-22 NOTE — TELEPHONE ENCOUNTER
Voicemail message left for patient stating the script she requested has been sent to [facility] [place].    Ultram script faxed to [facility] [place].    Rachell Meyer CMA

## 2017-12-22 NOTE — TELEPHONE ENCOUNTER
Routing refill request to provider for review/approval because:  Drug not on the [facility] refill protocol   traMADol (ULTRAM) 50 MG tablet 60 tablet 1 10/24/2017  No      Sig: TAKE 1-2 TABLETS BY MOUTH DAILY AS NEEDED FOR MODERATE PAIN         Last office visit:  12/4/17      Zoraida Min RN,   [facility]

## 2017-12-28 ENCOUNTER — TELEPHONE (OUTPATIENT)
Dept: ENDOCRINOLOGY | Facility: CLINIC | Age: 62
End: 2017-12-28

## 2017-12-28 DIAGNOSIS — M25.561 ARTHRALGIA OF BOTH KNEES: ICD-10-CM

## 2017-12-28 DIAGNOSIS — M25.562 ARTHRALGIA OF BOTH KNEES: ICD-10-CM

## 2017-12-28 DIAGNOSIS — M17.10 ARTHRITIS OF KNEE: ICD-10-CM

## 2017-12-28 NOTE — TELEPHONE ENCOUNTER
Lesvia, home care nurse, calling to confirm what diabetes educator, Mikayla Sellers, recommends for patient's Lantus dose. She states that patient's Lantus was recently increased for a short time to 47 units, while she had a steroid injection. Lesvia notes that patient has continued with the 47 units daily of Lantus and her blood sugars are better controlled.     Advised Lesvia message will be sent to Mikayla to review tomorrow when she is in clinic. Lesvia verbalizes understanding and agrees to plan.       Anahy Bloom, RN  Endocrine Care Coordinator  [facility]

## 2017-12-29 NOTE — TELEPHONE ENCOUNTER
Routing refill request to provider for review/approval because:  Drug not on the [facility] refill protocol     traMADol (ULTRAM) 50 MG tablet    Sig: TAKE 1-2 TABLETS BY MOUTH EVERY DAY AS NEEDED FOR PAIN  Last Written Prescription Date: 12/22/2017  Last Fill Quantity: 60,  # refills: 0   Last Office Visit with [facility], [facility] or [facility] prescribing provider: 12/4/2017                                         Next 5 appointments (look out 90 days)     Jan 25, 2018 10:50 AM CST   PHYSICAL with Tiffany King MD PhD   Formerly named [facility])    [address] [place] [address]   [phone]            Feb 09, 2018 11:15 AM CST   Return Visit with Elizabeth Medrano MD, MG ENDO NURSE   Formerly named [facility])    [address] [place] [address]   [phone]                  Helena Dickson RN

## 2017-12-29 NOTE — TELEPHONE ENCOUNTER
Left vm on cell phone of Lesvia pt's home health nurse, advising her if pt's bg levels have improved on 47 units lantus daily,then let's keep dose at 47 units. 2 weeks ago lantus dose increased from 44 to 47 units due to pt getting a steroid injetion in knee. Encouraged home health nurse to call clinic if questions or concers arise.     Mikayla Sellers RN, BSN, CDE   [facility]

## 2017-12-31 ENCOUNTER — RADIANT APPOINTMENT (OUTPATIENT)
Dept: GENERAL RADIOLOGY | Facility: CLINIC | Age: 62
End: 2017-12-31
Attending: PHYSICIAN ASSISTANT
Payer: MEDICARE

## 2017-12-31 ENCOUNTER — OFFICE VISIT (OUTPATIENT)
Dept: URGENT CARE | Facility: URGENT CARE | Age: 62
End: 2017-12-31
Payer: MEDICARE

## 2017-12-31 ENCOUNTER — RADIANT APPOINTMENT (OUTPATIENT)
Dept: GENERAL RADIOLOGY | Facility: CLINIC | Age: 62
End: 2017-12-31
Attending: FAMILY MEDICINE
Payer: MEDICARE

## 2017-12-31 VITALS
WEIGHT: 197.5 LBS | DIASTOLIC BLOOD PRESSURE: 60 MMHG | OXYGEN SATURATION: 100 % | TEMPERATURE: 98.2 F | BODY MASS INDEX: 34.99 KG/M2 | SYSTOLIC BLOOD PRESSURE: 110 MMHG | HEART RATE: 98 BPM
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
## 2017-12-31 DIAGNOSIS — S99.922A FOOT INJURY, LEFT, INITIAL ENCOUNTER: ICD-10-CM

## 2017-12-31 DIAGNOSIS — M25.572 PAIN IN JOINT INVOLVING ANKLE AND FOOT, LEFT: ICD-10-CM

## 2017-12-31 DIAGNOSIS — S92.352A CLOSED FRACTURE OF FIFTH METATARSAL BONE OF LEFT FOOT, PHYSEAL INVOLVEMENT UNSPECIFIED, INITIAL ENCOUNTER: Primary | ICD-10-CM

## 2017-12-31 PROCEDURE — 73610 X-RAY EXAM OF ANKLE: CPT | Mod: LT

## 2017-12-31 PROCEDURE — 73630 X-RAY EXAM OF FOOT: CPT | Mod: LT

## 2017-12-31 PROCEDURE — 99214 OFFICE O/P EST MOD 30 MIN: CPT | Performed by: PHYSICIAN ASSISTANT

## 2017-12-31 RX ORDER — HYDROCODONE BITARTRATE AND ACETAMINOPHEN 5; 325 MG/1; MG/1
1-2 TABLET ORAL EVERY 4 HOURS PRN
Qty: 20 TABLET | Refills: 0 | Status: SHIPPED | OUTPATIENT
Start: 2017-12-31 | End: 2017-12-31

## 2017-12-31 RX ORDER — IBUPROFEN 800 MG/1
800 TABLET, FILM COATED ORAL EVERY 8 HOURS PRN
Qty: 30 TABLET | Refills: 0 | Status: SHIPPED | OUTPATIENT
Start: 2017-12-31 | End: 2019-06-27

## 2017-12-31 RX ORDER — TRAMADOL HYDROCHLORIDE 50 MG/1
TABLET ORAL
Qty: 60 TABLET | Refills: 1 | Status: SHIPPED | OUTPATIENT
Start: 2017-12-31 | End: 2018-02-28

## 2017-12-31 ASSESSMENT — ENCOUNTER SYMPTOMS
PALPITATIONS: 0
EYE PAIN: 0
RESPIRATORY NEGATIVE: 1
HEMOPTYSIS: 0
CONSTITUTIONAL NEGATIVE: 1
DIAPHORESIS: 0
COUGH: 0
CARDIOVASCULAR NEGATIVE: 1
WEIGHT LOSS: 0
FEVER: 0

## 2017-12-31 NOTE — MR AVS SNAPSHOT
After Visit Summary   12/31/2017    Holly Muir    MRN: [id number]           Patient Information     Date Of Birth          1955        Visit Information        Provider Department      12/31/2017 4:15 PM Emily Loving PA-C [facility]        Today's Diagnoses     Closed fracture of fifth metatarsal bone of left foot, physeal involvement unspecified, initial encounter    -  1    Foot injury, left, initial encounter        Pain in joint involving ankle and foot, left           Follow-ups after your visit        Additional Services     ORTHO  REFERRAL       [facility] is referring you to the Orthopedic  Services at [facility].       The  Representative will assist you in the coordination of your Orthopedic and Musculoskeletal Care as prescribed by your physician.    The  Representative will call you within 1 business day to help schedule your appointment, or you may contact the  Representative at:    All areas ~ [phone]     Type of Referral : [facility]       Timeframe requested: 1 - 2 days    Coverage of these services is subject to the terms and limitations of your health insurance plan.  Please call member services at your health plan with any benefit or coverage questions.      If X-rays, CT or MRI's have been performed, please contact the facility where they were done to arrange for , prior to your scheduled appointment.  Please bring this referral request to your appointment and present it to your specialist.                  Your next 10 appointments already scheduled     Jan 25, 2018 10:30 AM CST   LAB with LAB FIRST FLOOR [facility] ([facility])    [id number] [place] [id number] [phone]           Please do not eat 10-12 hours before your appointment if you are coming in fasting for  "labs on lipids, cholesterol, or glucose (sugar). This does not apply to pregnant women. Water, hot tea and black coffee (with nothing added) are okay. Do not drink other fluids, diet soda or chew gum.            Jan 25, 2018 10:50 AM CST   PHYSICAL with Tiffany King MD PhD   [facility]    [address] [place] [id number] [phone]            Feb 09, 2018 11:15 AM CST   Return Visit with Elizabeth Medrano MD, MG ENDO NURSE   [facility]    [address] [place] [id number] [phone]              Who to contact     If you have questions or need follow up information about today's clinic visit or your schedule please contact [facility] directly at [phone].  Normal or non-critical lab and imaging results will be communicated to you by MyChart, letter or phone within 4 business days after the clinic has received the results. If you do not hear from us within 7 days, please contact the clinic through [employer] or phone. If you have a critical or abnormal lab result, we will notify you by phone as soon as possible.  Submit refill requests through [employer] or call your pharmacy and they will forward the refill request to us. Please allow 3 business days for your refill to be completed.          Additional Information About Your Visit        PicLyf Information     Pic[employer]f lets you send messages to your doctor, view your test results, renew your prescriptions, schedule appointments and more. To sign up, go to [email or web address] . Click on \"Log in\" on the left side of the screen, which will take you to the Welcome page. Then click on \"Sign up Now\" on the right side of the page.     You will be asked to enter the access code listed below, as well as some personal information. Please follow the directions to create your username and password.     Your access code is: " [email or web address]  Expires: 3/8/2018 11:32 AM     Your access code will  in 90 days. If you need help or a new code, please call your [place] clinic or [phone].        Care EveryWhere ID     This is your Care EveryWhere ID. This could be used by other organizations to access your [place] medical records  [id number]        Your Vitals Were     Pulse Temperature Pulse Oximetry BMI (Body Mass Index)          98 98.2  F (36.8  C) (Tympanic) 100% 34.99 kg/m2         Blood Pressure from Last 3 Encounters:   17 110/60   17 113/70   17 126/67    Weight from Last 3 Encounters:   17 197 lb 8 oz (89.6 kg)   17 197 lb 8 oz (89.6 kg)   17 193 lb 1.6 oz (87.6 kg)              We Performed the Following     ORTHO  REFERRAL     XR Ankle Left G/E 3 Views          Today's Medication Changes          These changes are accurate as of: 17  5:19 PM.  If you have any questions, ask your nurse or doctor.               These medicines have changed or have updated prescriptions.        Dose/Directions    * ibuprofen 200 MG tablet   Commonly known as:  ADVIL/MOTRIN   This may have changed:  Another medication with the same name was added. Make sure you understand how and when to take each.   Changed by:  Hunter Stoll MD        Dose:  3-4 tablet   Take 3-4 tablets by mouth every 4 hours as needed Reported on 2017   Refills:  0       * ibuprofen 800 MG tablet   Commonly known as:  ADVIL/MOTRIN   This may have changed:  You were already taking a medication with the same name, and this prescription was added. Make sure you understand how and when to take each.   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 Read the directions carefully, and ask your doctor or other care provider to review them with you.         Where to get your medicines      These medications were sent to [facility]pharmacy #[id number] - [place], [place] - [address] [place] [address] [place] [address]     Phone:  [phone]     ibuprofen 800 MG tablet                Primary Care Provider Office Phone # Fax #    Tiffany King MD PhD [phone] [address]  [facility] [address]        Goals        General    I will continue to monitor my blood sugars and treat the results as directed. (pt-stated)     Notes - Note created  12/9/2015 10:26 AM by Randy Suarez RN    As of today's date 12/9/2015 goal is met at 0 - 25%.   Goal Status:  Active          Equal Access to Services     MINERVA RIGGS : Hadii dharmesh ku hadasho Soomaali, waaxda luqadaha, qaybta kaalmada adeegyada, waxay roula uriostegui. So [facility] [phone].    ATENCIÓN: Si habla español, tiene a lopez disposición servicios gratuitos de asistencia lingüística. Llame al [phone].    We comply with applicable federal civil rights laws and [place] laws. We do not discriminate on the basis of race, color, national origin, age, disability, sex, sexual orientation, or gender identity.            Thank you!     Thank you for choosing [facility]  for your [facility]. Our goal is always to provide you with excellent care. Hearing back from our patients is one way we can continue  to improve our services. Please take a few minutes to complete the written survey that you may receive in the mail after your visit with us. Thank you!             Your Updated Medication List - Protect others around you: Learn how to safely use, store and throw away your medicines at www.disposemymeds.org.          This list is accurate as of: 12/31/17  5:19 PM.  Always use your most recent med list.                   Brand Name Dispense Instructions for use Diagnosis    ARIPiprazole 20 MG tablet    ABILIFY     Take 1 tablet (20 mg) by mouth daily Prescribed by Dr. Isael Jiménez at [facility]    Visual hallucination, Severe recurrent major depressive disorder with psychotic features (H)       B-COMPLEX/B-12 PO      Take 1 tablet by mouth daily.        blood glucose monitoring lancets     9 Box    Use to test blood sugar 8 times daily or as directed.    Type 2 diabetes mellitus, uncontrolled (H)       blood glucose monitoring test strip    ACCU-CHEK CELIA    250 each    Use to test blood sugars 8 times daily or as directed.    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)       BusPIRone HCl 30 MG Tabs     60 tablet    TAKE 1 TABLET BY MOUTH TWICE A DAY    Anxiety       [facility] ASPIRIN ADULT LOW DOSE 81 MG chewable tablet   Generic drug:  aspirin     90 tablet    TAKE 1 TABLET (81 MG) BY MOUTH DAILY ***OTC- NOT COVERED BY INS***    Uncontrolled diabetes mellitus (H)       enalapril 2.5 MG tablet    VASOTEC    90 tablet    TAKE 1 TABLET BY MOUTH EVERY DAY    Diabetes mellitus with background retinopathy (H)       gabapentin 300 MG capsule    NEURONTIN    360 capsule    1 capsule twice a day during the day and 2 capsules at night time    Diabetic polyneuropathy associated with type 2 diabetes mellitus (H)       *  ibuprofen 200 MG tablet    ADVIL/MOTRIN     Take 3-4 tablets by mouth every 4 hours as needed Reported on 2/24/2017        * ibuprofen 800 MG tablet    ADVIL/MOTRIN    30 tablet    Take 1 tablet (800 mg) by mouth every 8 hours as needed for moderate pain (with food)    Closed fracture of fifth metatarsal bone of left foot, physeal involvement unspecified, initial encounter       insulin aspart 100 UNIT/ML injection    NovoLOG FLEXPEN    30 mL    Take as directed with meals. Total daily dose approx 30 units.    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, with long-term current use of insulin (H)       insulin pen needle 31G X 5 MM     3 each    Use four times 4 day times a day        metFORMIN 1000 MG tablet    GLUCOPHAGE    60 tablet    TAKE 1 TABLET BY MOUTH 2 TIMES DAILY WITH MEALS    Uncontrolled type 2 diabetes mellitus with hyperglycemia, with long-term current use of insulin (H)       Multi-vitamin Tabs tablet   Generic drug:  multivitamin, therapeutic with minerals      1 TABLET DAILY        nystatin [id number] UNIT/GM Powd    MYCOSTATIN    60 g    Apply 1 dose topically 3 times daily as needed    Skin rash       omeprazole 20 MG CR capsule    priLOSEC    180 capsule    TAKE 2 CAPSULES BY MOUTH DAILY TAKE 30-60 MINUTES BEFORE A MEAL.    Gastroesophageal reflux disease without esophagitis       order for DME     1 Units    Equipment being ordered: Cane    Knee osteoarthritis       * order for DME     3 Month    Equipment being ordered:  Ostomy supplies.  Drain Pouch by [employer] #.    Also needs Barrier by [employer] #.    S/P colostomy (H)       * order for DME     1 Device    by Device route continuous Air walker -  Use as instructed    Ankle injury, left, initial  encounter, Abnormal x-ray       PARoxetine 20 MG tablet    PAXIL     TAKE 3 TABLETS AT BEDTIME (Prescribed by Dr. Isael Jim[facility]nez at [facility])    Severe major depression with psychotic features (H)       pioglitazone 30 MG tablet    ACTOS    90 tablet    Take 1 tablet (30 mg) by mouth daily    Uncontrolled type 2 diabetes mellitus with hyperglycemia, with long-term current use of insulin (H), Insulin resistance       * propranolol 60 MG 24 hr capsule    INDERAL LA    60 capsule    Take 1 capsule (60 mg) by mouth daily May take a second dose if tremor recurs.    Tremor       * propranolol 60 MG 24 hr capsule    INDERAL LA    60 capsule    Take 1 capsule (60 mg) by mouth See Admin Instructions Take one po q day.  May repeat dose if tremor persists.    Tremor       * propranolol 60 MG 24 hr capsule    INDERAL LA    60 capsule    TAKE 1 CAPSULE (60 MG) BY MOUTH 2 TIMES DAILY    Tremor       simvastatin 20 MG tablet    ZOCOR    90 tablet    TAKE 1 TABLET (20 MG) BY MOUTH AT BEDTIME    High cholesterol       SKIN PREP WIPES Misc     1 Bottle    to be used with colostomy care    Colostomy       traMADol 50 MG tablet    ULTRAM    60 tablet    TAKE 1-2 TABLETS BY MOUTH EVERY DAY AS NEEDED FOR PAIN    Arthritis of knee, Arthralgia of both knees       Universal Remover Wipes Misc     1 Each    uses to remove barrier from colostomy bag at time of change    Colostomy       vitamin D 1000 UNITS capsule     90 capsule    Take 2,000 to 4,000 Units per day        * Notice:  This list has 7 medication(s) that are the same as other medications prescribed for you. Read the directions carefully, and ask your doctor or other care provider to review them with you.

## 2017-12-31 NOTE — PROGRESS NOTES
SUBJECTIVE:                                                      HPI  Holly Muir is a 62 year old female who presents to clinic today for the following health issues:  L foot pain     Duration: two weeks ago.  Sustained L foot and ankle injury after missing steps at home 2weeks ago.  No head injuries or LOC.  Was able to brace herself on her arms.     Description (location/character/radiation): left foot with some discomfort in her ankle    Intensity:  moderate    Accompanying signs and symptoms: also reports swelling and mild numbness but no tingling or weakness.  No redness, drainage or fevers.      History (similar episodes/previous evaluation): patient fell two steps of stairs two weeks ago.  DM and DJD    Precipitating or alleviating factors:  Pain is worse with palpation, weight bearing and ambulation and is relieved with rest.     Therapies tried and outcome:  Wearing short gray boot, ace wrap with minimal relief. Is currently on tramadol which she takes for her knee pain.       Reviewed PMH.  Patient Active Problem List   Diagnosis     Hyperlipidemia LDL goal <100     Severe major depression with psychotic features (H)     History of cervical cancer     S/P colostomy (H)     Mixed incontinence urge and stress     Memory loss     Type 2 diabetes mellitus, uncontrolled A1C goal <8%     Obesity     Pain in joint, hand     Trigger finger, acquired     Synovitis and tenosynovitis     Dermatochalasis     Presbyopia     OCD (obsessive compulsive disorder)     HTN, goal below 140/90     Cataracts, both eyes     Health Care Home     Lumbago     Sprain of lumbar region     Generalized osteoarthrosis, unspecified site     Rotator cuff impingement syndrome     Hypoglycemia due to insulin     Encounter for long-term (current) use of insulin (H)     Colostomy, evaluate (H)     Urinary retention     ACP (advance care planning)     Bilateral low back pain without sciatica     Anxiety     Fecal incontinence due to  anorectal disorder     Visual hallucination     Type 2 diabetes mellitus with hyperglycemia (H)     Current Outpatient Prescriptions   Medication Sig Dispense Refill     traMADol (ULTRAM) 50 MG tablet TAKE 1-2 TABLETS BY MOUTH EVERY DAY AS NEEDED FOR PAIN 60 tablet 1     propranolol (INDERAL LA) 60 MG 24 hr capsule TAKE 1 CAPSULE (60 MG) BY MOUTH 2 TIMES DAILY 60 capsule 0     propranolol (INDERAL LA) 60 MG 24 hr capsule Take 1 capsule (60 mg) by mouth See Admin Instructions Take one po q day.  May repeat dose if tremor persists. 60 capsule 11     enalapril (VASOTEC) 2.5 MG tablet TAKE 1 TABLET BY MOUTH EVERY DAY 90 tablet 0     order for DME by Device route continuous Air walker -  Use as instructed 1 Device 0     metFORMIN (GLUCOPHAGE) 1000 MG tablet TAKE 1 TABLET BY MOUTH 2 TIMES DAILY WITH MEALS 60 tablet 2     insulin pen needle 31G X 5 MM Use four times 4 day times a day 3 each 5     insulin aspart (NOVOLOG FLEXPEN) 100 UNIT/ML injection Take as directed with meals. Total daily dose approx 30 units. 30 mL 0     omeprazole (PRILOSEC) 20 MG CR capsule TAKE 2 CAPSULES BY MOUTH DAILY TAKE 30-60 MINUTES BEFORE A MEAL. 180 capsule 3     gabapentin (NEURONTIN) 300 MG capsule 1 capsule twice a day during the day and 2 capsules at night time 360 capsule 1     ARIPiprazole (ABILIFY) 20 MG tablet Take 1 tablet (20 mg) by mouth daily Prescribed by Dr. Isael Jim[facility]nez at [facility]       buPROPion (WELLBUTRIN XL) 150 MG 24 hr tablet TAKE 3 TABLETS (450 MG) BY MOUTH EVERY MORNING       PARoxetine (PAXIL) 20 MG tablet TAKE 3 TABLETS AT BEDTIME (Prescribed by Dr. Isael Jiménez at [facility])       CVS ASPIRIN ADULT LOW DOSE 81 MG chewable tablet TAKE 1 TABLET (81 MG) BY MOUTH DAILY anOTC- NOT COVERED BY IN 90 tablet 1     pioglitazone (ACTOS) 30 MG tablet Take 1 tablet (30 mg) by mouth daily 90 tablet 3     insulin glargine (LANTUS SOLOSTAR) 100 UNIT/ML injection Inject 30 Units Subcutaneous At Bedtime  (Patient taking differently: Inject 48 Units Subcutaneous At Bedtime ) 9 mL 3     BusPIRone HCl 30 MG TABS TAKE 1 TABLET BY MOUTH TWICE A DAY 60 tablet 6     blood glucose monitoring (ACCU-CHEK FASTCLIX) lancets Use to test blood sugar 8 times daily or as directed. 9 Box 1     simvastatin (ZOCOR) 20 MG tablet TAKE 1 TABLET (20 MG) BY MOUTH AT BEDTIME 90 tablet 3     propranolol (INDERAL LA) 60 MG capsule Take 1 capsule (60 mg) by mouth daily May take a second dose if tremor recurs. 60 capsule 11     nystatin (MYCOSTATIN) 766532 UNIT/GM POWD Apply 1 dose topically 3 times daily as needed 60 g 3     blood glucose monitoring (ACCU-CHEK CELIA) test strip Use to test blood sugars 8 times daily or as directed. 250 each 3     ORDER FOR DME Equipment being ordered:  Ostomy supplies.  Drain Pouch by [employer] #.    Also needs Barrier by [place] #. 3 Month 4     Cholecalciferol (VITAMIN D) 1000 UNITS capsule Take 2,000 to 4,000 Units per day 90 capsule      ORDER FOR DME Equipment being ordered: Cane 1 Units 0     B Complex Vitamins (B-COMPLEX/B-12 PO) Take 1 tablet by mouth daily.        ibuprofen (ADVIL,MOTRIN) 200 MG tablet Take 3-4 tablets by mouth every 4 hours as needed Reported on 2/24/2017       UNIVERSAL REMOVER WIPES EX MISC uses to remove barrier from colostomy bag at time of change 1 Each 3     SKIN PREP WIPES MISC to be used with colostomy care 1 Bottle 3     MULTI-VITAMIN OR TABS 1 TABLET DAILY       Allergies   Allergen Reactions     Morphine Sulfate Itching       Review of Systems   Constitutional: Negative.  Negative for diaphoresis, fever and weight loss.   HENT: Negative.    Eyes: Negative for pain.   Respiratory: Negative.  Negative for cough and hemoptysis.    Cardiovascular: Negative.  Negative for chest pain and palpitations.   Musculoskeletal: Positive for joint pain.   Skin: Negative.    Endo/Heme/Allergies: Negative for environmental allergies.   All other systems reviewed and are  negative.      /60  Pulse 98  Temp 98.2  F (36.8  C) (Tympanic)  Wt 197 lb 8 oz (89.6 kg)  SpO2 100%  BMI 34.99 kg/m2  Physical Exam   Constitutional: She is oriented to person, place, and time and well-developed, well-nourished, and in no distress. No distress.   Musculoskeletal:        Left ankle: She exhibits normal range of motion, no swelling, no ecchymosis, no deformity, no laceration and normal pulse. Tenderness. Lateral malleolus and medial malleolus tenderness found. No AITFL, no CF ligament, no posterior TFL, no head of 5th metatarsal and no proximal fibula tenderness found. Achilles tendon normal.        Right foot: There is normal range of motion, no tenderness, no bony tenderness, no swelling, normal capillary refill and no laceration.        Left foot: There is tenderness, bony tenderness (with palpation over the 4th and 5th metatarsals.  no bony stepoffs) and swelling. There is normal range of motion, normal capillary refill, no crepitus, no deformity and no laceration.   Neurological: She is alert and oriented to person, place, and time. She has normal sensation, normal strength and normal reflexes. Gait abnormal.   Skin: Skin is warm, dry and intact.   Distal pulses are 2+ and symmetric.  No peripheral edema.   Nursing note and vitals reviewed.  3V of L ankle:  No acute fractures or dislocations.  No soft tissue swelling or masses.  Will send for overread.  3V of L foot:  Oblique fx of the 5th metatarsal with mild displacement.  No dislocations.  No soft tissue swelling or masses.  Will send for overread.          Assessment/Plan:  Closed fracture of fifth metatarsal bone of left foot, physeal involvement unspecified, initial encounter:  Xrays are positive for acute fx of the 5th metatarsal with mild displacement.  Will have her continue with the short walking boot and recommend non-weight bearing.  Crutches were given to patient.  Will give ibuprofen as needed for pain and recommend  RICE. She already has tramadol and will avoid all other narcotics.  Will also send to podiatry for further evaluation and management.  Recheck in clinic if symptoms worsen or if symptoms do not improve.  To the ER if worsening symptoms.  -     ibuprofen (ADVIL/MOTRIN) 800 MG tablet; Take 1 tablet (800 mg) by mouth every 8 hours as needed for moderate pain (with food)  -     ORTHO  REFERRAL    Foot injury, left, initial encounter  -     XR Foot Left G/E 3 Views; Future    Pain in joint involving ankle and foot, left  -     XR Ankle Left G/E 3 Views  -     ORTHO  REFERRAL      Emily Loving PA-C

## 2018-01-04 ENCOUNTER — OFFICE VISIT (OUTPATIENT)
Dept: PODIATRY | Facility: CLINIC | Age: 63
End: 2018-01-04
Payer: MEDICARE

## 2018-01-04 VITALS — WEIGHT: 197 LBS | BODY MASS INDEX: 34.91 KG/M2 | OXYGEN SATURATION: 100 % | HEART RATE: 74 BPM

## 2018-01-04 DIAGNOSIS — S92.355A CLOSED NONDISPLACED FRACTURE OF FIFTH METATARSAL BONE OF LEFT FOOT, INITIAL ENCOUNTER: Primary | ICD-10-CM

## 2018-01-04 PROCEDURE — 28470 CLTX METATARSAL FX WO MNP EA: CPT | Mod: T4 | Performed by: PODIATRIST

## 2018-01-04 NOTE — MR AVS SNAPSHOT
After Visit Summary   1/4/2018    Holly Muir    MRN: [id number]           Patient Information     Date Of Birth          1955        Visit Information        Provider Department      1/4/2018 11:45 AM Salvador Billingsley, MARYBETH [facility]        Care Instructions    We wish you continued good healing. If you have any questions or concerns, please do not hesitate to contact us at [phone]      Please remember to call and schedule a follow up appointment if one was recommended at your earliest convenience.   PODIATRY CLINIC HOURS  TELEPHONE NUMBER    Dr. Salvador Billingsley D.P.M FAC FAS    Clinics:  [facility]        Honey Larios MA  Medical Assistant  Tuesday 1PM-6PM  [place]  Wednesday 7AM-2PM  [place]  Thursday 10AM-6PM  [place]  Friday 7AM-345PM  [place]  Specialty schedulers:   [phone] to make an appointment with any Specialty Provider.        Urgent Care locations:    [facility] Monday-Friday 5 pm - 9 pm. Saturday-Sunday 9 am -5pm    Monday-Friday 11 am - 9 pm Saturday 9 am - 5 pm     Monday-Sunday 12 noon-8PM [phone]     If you need a medication refill, please contact us you may need lab work and/or a follow up visit prior to your refill (i.e. Antifungal medications).    [employer] (secure e-mail communication and access to your chart) to send a message or to make an appointment.    If MRI needed please call [facility] at [phone]        Weight management plan: Patient was referred to their PCP to discuss a diet and exercise plan.            Follow-ups after your visit        Your next 10 appointments already scheduled     Jan 25, 2018 10:30 AM CST   LAB with LAB FIRST FLOOR [facility] ([facility]    [id number] [place] [id number] [phone]        "    Please do not eat 10-12 hours before your appointment if you are coming in fasting for labs on lipids, cholesterol, or glucose (sugar). This does not apply to pregnant women. Water, hot tea and black coffee (with nothing added) are okay. Do not drink other fluids, diet soda or chew gum.            Jan 25, 2018 10:50 AM CST   [facility]    [address] [place] [id number] [phone]            Feb 09, 2018 11:15 AM CST   Return Visit with Elizabeth Medrano MD, MG ENDO NURSE   [facility]    [address] [place] [id number] [phone]              Who to contact     If you have questions or need follow up information about today's clinic visit or your schedule please contact [facility] directly at [phone].  Normal or non-critical lab and imaging results will be communicated to you by [employer], letter or phone within 4 business days after the clinic has received the results. If you do not hear from us within 7 days, please contact the clinic through [employer] or phone. If you have a critical or abnormal lab result, we will notify you by phone as soon as possible.  Submit refill requests through [employer] or call your pharmacy and they will forward the refill request to us. Please allow 3 business days for your refill to be completed.          Additional Information About Your Visit        [employer] Information     [employer] lets you send messages to your doctor, view your test results, renew your prescriptions, schedule appointments and more. To sign up, go to [email or web address] . Click on \"Log in\" on the left side of the screen, which will take you to the Welcome page. Then click on \"Sign up Now\" on the right side of the page.     You will be asked to enter the access code listed below, as well as some personal information. Please follow " the directions to create your username and password.     Your access code is: [email or web address]  Expires: 3/8/2018 11:32 AM     Your access code will  in 90 days. If you need help or a new code, please call your [place] clinic or [phone].        Care EveryWhere ID     This is your Care EveryWhere ID. This could be used by other organizations to access your [place] medical records  [id number]        Your Vitals Were     Pulse Pulse Oximetry BMI (Body Mass Index)             74 100% 34.91 kg/m2          Blood Pressure from Last 3 Encounters:   17 110/60   17 113/70   17 126/67    Weight from Last 3 Encounters:   18 197 lb (89.4 kg)   17 197 lb 8 oz (89.6 kg)   17 197 lb 8 oz (89.6 kg)              Today, you had the following     No orders found for display         Today's Medication Changes          These changes are accurate as of: 18 11:47 AM.  If you have any questions, ask your nurse or doctor.               These medicines have changed or have updated prescriptions.        Dose/Directions    ibuprofen 800 MG tablet   Commonly known as:  ADVIL/MOTRIN   This may have changed:  Another medication with the same name was removed. Continue taking this medication, and follow the directions you see here.   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 Continue taking this medication, and follow the directions you see here.   Used for:  Tremor   Changed by:  Myke Becerra MD        Dose:  60 mg   Take 1 capsule (60 mg) by mouth daily May take a second dose if tremor recurs.   Quantity:  60 capsule   Refills:  11       * propranolol 60 MG 24 hr capsule   Commonly known as:  INDERAL LA   This may have changed:  Another medication with the same name was removed. Continue taking this medication, and follow the directions you see here.   Used for:  Tremor   Changed by:  Myke Becerra MD        Dose:  60 mg   Take 1 capsule (60 mg) by mouth See Admin Instructions Take one po q day.  May repeat dose if tremor persists.   Quantity:  60 capsule   Refills:  11       * Notice:  This list has 2 medication(s) that are the same as other medications prescribed for you. Read the directions carefully, and ask your doctor or other care provider to review them with you.      Stop taking these medicines if you haven't already. Please contact your care team if you have questions.     SKIN PREP WIPES Misc   Stopped by:  Salvador Billingsley DPM                    Primary Care Provider Office Phone # Fax #    Tiffany King MD PhD [phone] [address] [facility] [address]        Goals        General    I will continue to monitor my blood sugars and treat the results as directed. (pt-stated)     Notes - Note created  12/9/2015 10:26 AM by Randy Suarez RN    As of today's date 12/9/2015 goal is met at 0 - 25%.   Goal Status:  Active          Equal Access to Services     [facility]: Hadii dharmesh persaud Soila, waaxda luqadaha, qaybta kaalmakim leiva . [facility] [phone].    ATENCIÓN: Si habla español, tiene a lopez disposición servicios gratuitos de  isabel lingüística. Garrick al [phone].    We comply with applicable federal civil rights laws and [place] laws. We do not discriminate on the basis of race, color, national origin, age, disability, sex, sexual orientation, or gender identity.            Thank you!     Thank you for choosing [facility]  [facility] your [facility]. Our goal is always to provide you with excellent care. Hearing back from our patients is one way we can continue to improve our services. Please take a few minutes to complete the written survey that you may receive in the mail after your visit with us. Thank you!             Your Updated Medication List - Protect others around you: Learn how to safely use, store and throw away your medicines at www.disposemymeds.org.          This list is accurate as of: 1/4/18 11:47 AM.  Always use your most recent med list.                   Brand Name Dispense Instructions for use Diagnosis    ARIPiprazole 20 MG tablet    ABILIFY     Take 1 tablet (20 mg) by mouth daily Prescribed by Dr. 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        blood glucose monitoring lancets     9 Box    Use to test blood sugar 8 times daily or as directed.    Type 2 diabetes mellitus, uncontrolled (H)       blood glucose monitoring test strip    ACCU-CHEK CELIA    250 each    Use to test blood sugars 8 times daily or as directed.    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)       BusPIRone HCl 30 MG Tabs     60 tablet    TAKE 1 TABLET BY MOUTH TWICE A DAY    Anxiety       CVS ASPIRIN ADULT LOW DOSE 81 MG chewable tablet   Generic drug:  aspirin     90 tablet    TAKE 1  TABLET (81 MG) BY MOUTH DAILY ***OTC- NOT COVERED BY INS***    Uncontrolled diabetes mellitus (H)       enalapril 2.5 MG tablet    VASOTEC    90 tablet    TAKE 1 TABLET BY MOUTH EVERY DAY    Diabetes mellitus with background retinopathy (H)       gabapentin 300 MG capsule    NEURONTIN    360 capsule    1 capsule twice a day during the day and 2 capsules at night time    Diabetic polyneuropathy associated with type 2 diabetes mellitus (H)       ibuprofen 800 MG tablet    ADVIL/MOTRIN    30 tablet    Take 1 tablet (800 mg) by mouth every 8 hours as needed for moderate pain (with food)    Closed fracture of fifth metatarsal bone of left foot, physeal involvement unspecified, initial encounter       insulin aspart 100 UNIT/ML injection    NovoLOG FLEXPEN    30 mL    Take as directed with meals. Total daily dose approx 30 units.    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, with long-term current use of insulin (H)       insulin pen needle 31G X 5 MM     3 each    Use four times 4 day times a day        metFORMIN 1000 MG tablet    GLUCOPHAGE    60 tablet    TAKE 1 TABLET BY MOUTH 2 TIMES DAILY WITH MEALS    Uncontrolled type 2 diabetes mellitus with hyperglycemia, with long-term current use of insulin (H)       Multi-vitamin Tabs tablet   Generic drug:  multivitamin, therapeutic with minerals      1 TABLET DAILY        nystatin 119347 UNIT/GM Powd    MYCOSTATIN    60 g    Apply 1 dose topically 3 times daily as needed    Skin rash       omeprazole 20 MG CR capsule    priLOSEC    180 capsule    TAKE 2 CAPSULES BY MOUTH DAILY TAKE 30-60 MINUTES BEFORE A MEAL.    Gastroesophageal reflux disease without esophagitis       order for DME     1 Units    Equipment being ordered: Cane    Knee  osteoarthritis       * order for DME     3 Month    Equipment being ordered:  Ostomy supplies.  Drain Pouch by [employer] #.    Also needs Barrier by [place] #.    S/P colostomy (H)       * order for DME     1 Device    by Device route continuous Air walker -  Use as instructed    Ankle injury, left, initial encounter, Abnormal x-ray       PARoxetine 20 MG tablet    PAXIL     TAKE 3 TABLETS AT BEDTIME (Prescribed by Dr. Isael Jim[facility]nez at [facility])    Severe major depression with psychotic features (H)       pioglitazone 30 MG tablet    ACTOS    90 tablet    Take 1 tablet (30 mg) by mouth daily    Uncontrolled type 2 diabetes mellitus with hyperglycemia, with long-term current use of insulin (H), Insulin resistance       * propranolol 60 MG 24 hr capsule    INDERAL LA    60 capsule    Take 1 capsule (60 mg) by mouth daily May take a second dose if tremor recurs.    Tremor       * propranolol 60 MG 24 hr capsule    INDERAL LA    60 capsule    Take 1 capsule (60 mg) by mouth See Admin Instructions Take one po q day.  May repeat dose if tremor persists.    Tremor       simvastatin 20 MG tablet    ZOCOR    90 tablet    TAKE 1 TABLET (20 MG) BY MOUTH AT BEDTIME    High cholesterol       traMADol 50 MG tablet    ULTRAM    60 tablet    TAKE 1-2 TABLETS BY MOUTH EVERY DAY AS NEEDED FOR PAIN    Arthritis of knee, Arthralgia of both knees       Universal Remover Wipes Misc     1 Each    uses to remove barrier from colostomy bag at time of change    Colostomy       vitamin D 1000 UNITS capsule     90 capsule    Take 2,000 to 4,000 Units per day        * Notice:  This list has 4 medication(s) that are the same as other medications prescribed for you. Read the directions carefully, and ask your doctor or other care provider to review them with you.

## 2018-01-04 NOTE — PROGRESS NOTES
Subjective:    Pt is seen today in consult from Emily Loving for 5th met fx left foot.  Was caused by missed step at home 3 wk ago.  Had swelling, pain, and ecchymosis.  Seen in clinic, xrays taken, and given PO shoe, crutches.  She has diabetes, poorly controlled.        ROS:  denies numbness, erythema, shortness of breath       Allergies   Allergen Reactions     Morphine Sulfate Itching           Patient Active Problem List   Diagnosis     Hyperlipidemia LDL goal <100     Severe major depression with psychotic features (H)     History of cervical cancer     S/P colostomy (H)     Mixed incontinence urge and stress     Memory loss     Type 2 diabetes mellitus, uncontrolled A1C goal <8%     Obesity     Pain in joint, hand     Trigger finger, acquired     Synovitis and tenosynovitis     Dermatochalasis     Presbyopia     OCD (obsessive compulsive disorder)     HTN, goal below 140/90     Cataracts, both eyes     Health Care Home     Lumbago     Sprain of lumbar region     Generalized osteoarthrosis, unspecified site     Rotator cuff impingement syndrome     Hypoglycemia due to insulin     Encounter for long-term (current) use of insulin (H)     Colostomy, evaluate (H)     Urinary retention     ACP (advance care planning)     Bilateral low back pain without sciatica     Anxiety     Fecal incontinence due to anorectal disorder     Visual hallucination     Type 2 diabetes mellitus with hyperglycemia (H)       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       Past Surgical History:   Procedure Laterality Date     ARTHROSCOPY KNEE RT/LT      LT     BREAST LUMPECTOMY, RT/LT      LT-benign      C  EXCIS PTERYGIUM  10/08    [facility]     C STOMACH SURGERY PROCEDURE UNLISTED       COLONOSCOPY  5/10/2012    Procedure:COLONOSCOPY; screening colonoscopy; Surgeon:MILLA VEGA; [facility]     COLOSTOMY  2000     COLOSTOMY       HC COLONOSCOPY THRU STOMA, DIAGNOSTIC  2002    normal per report-no records available-records destroyed     HYSTERECTOMY, PAP NO LONGER INDICATED      done in [place]-cervical cancer     IMPLANT STIMULATOR SACRAL NERVE STAGE ONE Right 3/10/2015    Procedure: IMPLANT STIMULATOR SACRAL NERVE STAGE ONE;  Surgeon: Teodora Yusuf MD;  [facility]     IMPLANT STIMULATOR SACRAL NERVE STAGE TWO Right 3/31/2015    Procedure: IMPLANT STIMULATOR SACRAL NERVE STAGE TWO;  Surgeon: Teodora Yusuf MD;  [facility]     KNEE SURGERY       SALPINGO OOPHORECTOMY,R/L/JENNIFER      Salpingo Oophorectomy, RT/LT/JENNIFER       Family History   Problem Relation Age of Onset     DIABETES Mother      CEREBROVASCULAR DISEASE Mother      DIABETES Father      Hypertension Father      CANCER Maternal Grandfather      CANCER Paternal Grandfather      DIABETES Brother      DIABETES Sister      Thyroid Disease Daughter      Thyroid Disease Sister      Multiple Sclerosis Daughter      Glaucoma No family hx of      Macular Degeneration No family hx of        Social History   Substance Use Topics     Smoking status: Former Smoker     Quit date: 8/9/2016     Smokeless tobacco: Never Used     Alcohol use 0.0 oz/week     0 Standard drinks or equivalent per week      Comment: social occasions       Objective:    Pulse 74  Wt 197 lb (89.4 kg)  SpO2 100%  BMI 34.91 kg/m2,  Pulses are palpable +2/4 DP & PT bilateral.  CRT < 3 seconds bilateral.  Toes warm to touch.  [place] reflex equal and symmetrical bilateral.   sensation to light touch is intact.  Decent muscle strength and ROM to all areas tested and no signs of tendon rupture.  Normal arch.  Normal ROM all forefoot and rearfoot joints.  Pain upon  palpation to diaphyseal bone of left 5th metatarsal.  Edema and echymosis noted.  No erythema.  No sign of ulceration, infection, or drainage.      FOOT THREE VIEWS LEFT  12/31/2017 4:26 PM      HISTORY: Fall down stairs.     COMPARISON: None.         IMPRESSION: Oblique fracture through the diaphysis of the fifth  metatarsal. No other fracture demonstrated. No dislocation.    Assessment:  5th met fracture left foot    Plan:  X-rays personally reviewed.  Discussed etiology and treatment options with the patient in detail.  NWB at all times so distal fragment does not elevate.  Will dispense cam walker today and plantarflexed this.  Patient to wear this at all times while ambulating, but will not walk on her foot even with cam walker on.  When not moving patient will take this off and do ROM to prevent blood clot and joint stiffness.  Patient will not sleep with this on.  Rx for knee walker.  Discussed this will be slower to heal because of poorly controlled diabetes.  If displacement or signs of slow healing pt may require surgery. F/U 4 wks for repeat x-ray.  Thank you for allowing me participate in the care of this patient.  Fracture care        Salvador Billingsley DPM, FACFAS

## 2018-01-04 NOTE — LETTER
1/4/2018         RE: Holly Muir  [address] [facility] [id number]        Dear Colleague,    Thank you for referring your patient, Hloly Muir, to the [facility]. Please see a copy of my visit note below.    Subjective:    Pt is seen today in consult from Emily Loving for 5th met fx left foot.  Was caused by missed step at home 3 wk ago.  Had swelling, pain, and ecchymosis.  Seen in clinic, xrays taken, and given PO shoe, crutches.  She has diabetes, poorly controlled.        ROS:  denies numbness, erythema, shortness of breath       Allergies   Allergen Reactions     Morphine Sulfate Itching           Patient Active Problem List   Diagnosis     Hyperlipidemia LDL goal <100     Severe major depression with psychotic features (H)     History of cervical cancer     S/P colostomy (H)     Mixed incontinence urge and stress     Memory loss     Type 2 diabetes mellitus, uncontrolled A1C goal <8%     Obesity     Pain in joint, hand     Trigger finger, acquired     Synovitis and tenosynovitis     Dermatochalasis     Presbyopia     OCD (obsessive compulsive disorder)     HTN, goal below 140/90     Cataracts, both eyes     Health Care Home     Lumbago     Sprain of lumbar region     Generalized osteoarthrosis, unspecified site     Rotator cuff impingement syndrome     Hypoglycemia due to insulin     Encounter for long-term (current) use of insulin (H)     Colostomy, evaluate (H)     Urinary retention     ACP (advance care planning)     Bilateral low back pain without sciatica     Anxiety     Fecal incontinence due to anorectal disorder     Visual hallucination     Type 2 diabetes mellitus with hyperglycemia (H)       Past Medical History:   Diagnosis Date     Cataracts, both eyes 5/8/2013     Cervical cancer (H)      Diabetes      Diabetic retinopathy (H)      HTN      Inflammatory arthritis      Major depression      Memory loss      Nephropathy      OA (osteoarthritis) of knee  9/11/2013     Other and unspecified hyperlipidemia      Pterygium eye      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       Past Surgical History:   Procedure Laterality Date     ARTHROSCOPY KNEE RT/LT      LT     BREAST LUMPECTOMY, RT/LT      LT-benign      C EXCIS PTERYGIUM  10/08    [facility]     C STOMACH SURGERY PROCEDURE UNLISTED       COLONOSCOPY  5/10/2012    Procedure:COLONOSCOPY; screening colonoscopy; Surgeon:MILLA VEGA; [facility]     COLOSTOMY  2000     COLOSTOMY       [facility] COLONOSCOPY THRU STOMA, DIAGNOSTIC  2002    normal per report-no records available-records destroyed     HYSTERECTOMY, PAP NO LONGER INDICATED      done in [place]-cervical cancer     IMPLANT STIMULATOR SACRAL NERVE STAGE ONE Right 3/10/2015    Procedure: IMPLANT STIMULATOR SACRAL NERVE STAGE ONE;  Surgeon: Teodora Yusuf MD;  [facility]     IMPLANT STIMULATOR SACRAL NERVE STAGE TWO Right 3/31/2015    Procedure: IMPLANT STIMULATOR SACRAL NERVE STAGE TWO;  Surgeon: Teodora Yusuf MD;  [facility]     KNEE SURGERY       SALPINGO OOPHORECTOMY,R/L/JENNIFER      Salpingo Oophorectomy, RT/LT/JENNIFER       Family History   Problem Relation Age of Onset     DIABETES Mother      CEREBROVASCULAR DISEASE Mother      DIABETES Father      Hypertension Father      CANCER Maternal Grandfather      CANCER Paternal Grandfather      DIABETES Brother      DIABETES Sister      Thyroid Disease Daughter      Thyroid Disease Sister      Multiple Sclerosis Daughter      Glaucoma No family hx of      Macular Degeneration No family hx of        Social History   Substance Use Topics     Smoking status: Former Smoker     Quit date: 8/9/2016     Smokeless tobacco: Never Used     Alcohol use 0.0 oz/week     0 Standard drinks or equivalent per week      Comment: social occasions       Objective:    Pulse 74  Wt 197 lb (89.4 kg)  SpO2 100%  BMI 34.91 kg/m2,  Pulses are palpable +2/4 DP & PT bilateral.  CRT < 3 seconds bilateral.  Toes warm  to touch.  [place] reflex equal and symmetrical bilateral.   sensation to light touch is intact.  Decent muscle strength and ROM to all areas tested and no signs of tendon rupture.  Normal arch.  Normal ROM all forefoot and rearfoot joints.  Pain upon palpation to diaphyseal bone of left 5th metatarsal.  Edema and echymosis noted.  No erythema.  No sign of ulceration, infection, or drainage.      FOOT THREE VIEWS LEFT  12/31/2017 4:26 PM      HISTORY: Fall down stairs.     COMPARISON: None.         IMPRESSION: Oblique fracture through the diaphysis of the fifth  metatarsal. No other fracture demonstrated. No dislocation.    Assessment:  5th met fracture left foot    Plan:  X-rays personally reviewed.  Discussed etiology and treatment options with the patient in detail.  NWB at all times so distal fragment does not elevate.  Will dispense cam walker today and plantarflexed this.  Patient to wear this at all times while ambulating, but will not walk on her foot even with cam walker on.  When not moving patient will take this off and do ROM to prevent blood clot and joint stiffness.  Patient will not sleep with this on.  Rx for knee walker.  Discussed this will be slower to heal because of poorly controlled diabetes.  If displacement or signs of slow healing pt may require surgery. F/U 4 wks for repeat x-ray.  Thank you for allowing me participate in the care of this patient.  Fracture care        Salvador Billingsley DPM, FACFAS      Again, thank you for allowing me to participate in the care of your patient.        Sincerely,        Salvador Billingsley DPM

## 2018-01-04 NOTE — PATIENT INSTRUCTIONS
We wish you continued good healing. If you have any questions or concerns, please do not hesitate to contact us at [phone]      Please remember to call and schedule a follow up appointment if one was recommended at your earliest convenience.   PODIATRY CLINIC HOURS  TELEPHONE NUMBER    Dr. Salvador Billingsley D.P.M [facility]    Clinics:  [facility]        Honey Larios MA  Medical Assistant  Tuesday 1PM-6PM  [facility]  Wednesday 7AM-2PM  [place]  Thursday 10AM-6PM  [place] Friday 7AM-345PM  [place]  Specialty schedulers:   [phone] to make an appointment with any Specialty Provider.        Urgent Care locations:    [facility] Monday-Friday 5 pm - 9 pm. Saturday-Sunday 9 am -5pm    Monday-Friday 11 am - 9 pm Saturday 9 am - 5 pm     Monday-Sunday 12 noon-8PM [phone]     If you need a medication refill, please contact us you may need lab work and/or a follow up visit prior to your refill (i.e. Antifungal medications).    "[employer]t (secure e-mail communication and access to your chart) to send a message or to make an appointment.    If MRI needed please call Efrain Garcia at [phone]        Weight management plan: Patient was referred to their PCP to discuss a diet and exercise plan.

## 2018-01-12 DIAGNOSIS — Z53.9 DIAGNOSIS NOT YET DEFINED: Primary | ICD-10-CM

## 2018-01-12 PROCEDURE — G0180 MD CERTIFICATION HHA PATIENT: HCPCS | Performed by: INTERNAL MEDICINE

## 2018-01-18 DIAGNOSIS — E78.00 HIGH CHOLESTEROL: ICD-10-CM

## 2018-01-18 NOTE — LETTER
January 19, 2018      Holly Muir  [address] [address] [facility] [address]              Dear Holly,      We recently received a call from your pharmacy requesting a refill of your medication. We have provided a 30 day refill of your medication, simvastatin (ZOCOR) 20 MG tablet, as requested.      A review of your chart indicates that your last fasting labs (lipids) were on 12/19/2016.  Labs for the above medication are required yearly.     We have authorized one time 30 day refill of your medication to allow time for you to schedule.     If you have a history of diabetes or high cholesterol, please come in fasting for the appointment. Fasting entails nothing to eat or drink 8 hours prior to your appointment; with the exception on water. You may take your medication the day of the appointment.    Please call the clinic to schedule your appointment.    Thank you for taking an active role in your healthcare.      Sincerely,    [facility]

## 2018-01-19 RX ORDER — SIMVASTATIN 20 MG
TABLET ORAL
Qty: 30 TABLET | Refills: 0 | Status: SHIPPED | OUTPATIENT
Start: 2018-01-19 | End: 2018-03-04

## 2018-01-19 RX ORDER — LORATADINE 10 MG
TABLET ORAL
Qty: 90 TABLET | Refills: 3 | Status: SHIPPED | OUTPATIENT
Start: 2018-01-19 | End: 2018-11-27

## 2018-01-19 NOTE — TELEPHONE ENCOUNTER
[facility] ASPIRIN ADULT LOW DOSE 81 MG chewable tablet  Last Written Prescription Date:  7/24/2017  Last Fill Quantity: 90,  # refills: 1   Last Office Visit with [facility] prescribing provider:  12/4/2017   Future Office Visit:    Next 5 appointments (look out 90 days)     Jan 25, 2018  1:00 PM CST   Return Visit with Salvador Billingsley DPM   [facility] ([facility]    [address] [place] [id number]   [phone]            Feb 09, 2018 11:15 AM CST   Return Visit with Elizabeth Medrano MD, MG ENDO NURSE   [facility]    [address] [place] [address] [phone]                   Analgesics (Non-Narcotic Tylenol and ASA Only) Passed1/18 1:18 AM   Recent or future visit with authorizing provider's specialty    Patient is age 20 years or older     simvastatin (ZOCOR) 20 MG tablet *Karla refill - Due for labs*  Last Written Prescription Date:  1/12/2017  Last Fill Quantity: 90,  # refills: 3   Last Office Visit with [facility] or [facility] prescribing provider:  12/4/2017   Future Office Visit:    Next 5 appointments (look out 90 days)     Jan 25, 2018  1:00 PM CST   Return Visit with Salvador Billingsley DPM   [facility] ([facility])    [address] [place] [address]   [phone]            Feb 09, 2018 11:15 AM CST   Return Visit with Elizabeth Medrano MD, MG ENDO NURSE   [facility]    [address] [place] [address] [phone]                   LDL Cholesterol Calculated   Date Value Ref Range Status   12/19/2016 21 <100 mg/dL Final     Comment:     Desirable:       <100 mg/dl     Creatinine   Date Value Ref Range Status   12/19/2016 0.88 0.52 - 1.04 mg/dL Final     Statins Protocol Failed1/18 1:18 AM   LDL on file in past 12 months    No abnormal creatine kinase in past 12 months    Recent or future visit  with authorizing provider    Patient is age 18 or older    No active pregnancy on record    No positive pregnancy test in past 12 months     Refilled per [facility] protocol. Letter sent to patient.    Helena Dickson RN

## 2018-01-25 ENCOUNTER — RADIANT APPOINTMENT (OUTPATIENT)
Dept: GENERAL RADIOLOGY | Facility: CLINIC | Age: 63
End: 2018-01-25
Attending: PODIATRIST
Payer: MEDICARE

## 2018-01-25 ENCOUNTER — OFFICE VISIT (OUTPATIENT)
Dept: PODIATRY | Facility: CLINIC | Age: 63
End: 2018-01-25
Payer: MEDICARE

## 2018-01-25 VITALS — WEIGHT: 196 LBS | HEART RATE: 80 BPM | BODY MASS INDEX: 34.73 KG/M2 | OXYGEN SATURATION: 97 %

## 2018-01-25 DIAGNOSIS — S92.355A CLOSED NONDISPLACED FRACTURE OF FIFTH METATARSAL BONE OF LEFT FOOT, INITIAL ENCOUNTER: Primary | ICD-10-CM

## 2018-01-25 DIAGNOSIS — S92.355A NONDISPLACED FRACTURE OF FIFTH LEFT METATARSAL BONE: ICD-10-CM

## 2018-01-25 PROCEDURE — 73630 X-RAY EXAM OF FOOT: CPT | Mod: LT

## 2018-01-25 PROCEDURE — 99207 ZZC FRACTURE CARE IN GLOBAL PERIOD: CPT | Performed by: PODIATRIST

## 2018-01-25 NOTE — PATIENT INSTRUCTIONS
We wish you continued good healing. If you have any questions or concerns, please do not hesitate to contact us at [phone]      Please remember to call and schedule a follow up appointment if one was recommended at your earliest convenience.   PODIATRY CLINIC HOURS  TELEPHONE NUMBER    Dr. Salvador Billingsley D.P.M [facility]    Clinics:  [facility]        Honey Larios MA  Medical Assistant  Tuesday 1PM-6PM  [facility]  Wednesday 7AM-2PM  [place]  Thursday 10AM-6PM  [place] Friday 7AM-345PM  [place]  Specialty schedulers:   [phone] to make an appointment with any Specialty Provider.        Urgent Care locations:    [facility] Monday-Friday 5 pm - 9 pm. Saturday-Sunday 9 am -5pm    Monday-Friday 11 am - 9 pm Saturday 9 am - 5 pm     Monday-Sunday 12 noon-8PM [phone]     If you need a medication refill, please contact us you may need lab work and/or a follow up visit prior to your refill (i.e. Antifungal medications).    [employer] (secure e-mail communication and access to your chart) to send a message or to make an appointment.    If MRI needed please call Efrain Garcia at [phone]        Weight management plan: Patient was referred to their PCP to discuss a diet and exercise plan.

## 2018-01-25 NOTE — PROGRESS NOTES
Subjective:    1/4/18   Pt is seen today in consult from Emily Loving for 5th met fx left foot.  Was caused by missed step at home 3 wk ago.  Had swelling, pain, and ecchymosis.  Seen in clinic, xrays taken, and given PO shoe, crutches.  She has diabetes, poorly controlled.     1/25/18  Patient has less pain, less edema.  No pain in cam walker.  She has diabetes and peripheral neuropathy.         ROS:  denies numbness, erythema, shortness of breath       Allergies   Allergen Reactions     Morphine Sulfate Itching           Patient Active Problem List   Diagnosis     Hyperlipidemia LDL goal <100     Severe major depression with psychotic features (H)     History of cervical cancer     S/P colostomy (H)     Mixed incontinence urge and stress     Memory loss     Type 2 diabetes mellitus, uncontrolled A1C goal <8%     Obesity     Pain in joint, hand     Trigger finger, acquired     Synovitis and tenosynovitis     Dermatochalasis     Presbyopia     OCD (obsessive compulsive disorder)     HTN, goal below 140/90     Cataracts, both eyes     Health Care Home     Lumbago     Sprain of lumbar region     Generalized osteoarthrosis, unspecified site     Rotator cuff impingement syndrome     Hypoglycemia due to insulin     Encounter for long-term (current) use of insulin (H)     Colostomy, evaluate (H)     Urinary retention     ACP (advance care planning)     Bilateral low back pain without sciatica     Anxiety     Fecal incontinence due to anorectal disorder     Visual hallucination     Type 2 diabetes mellitus with hyperglycemia (H)       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       Past  Surgical History:   Procedure Laterality Date     ARTHROSCOPY KNEE RT/LT      LT     BREAST LUMPECTOMY, RT/LT      LT-benign      C EXCIS PTERYGIUM  10/08    [facility]     C STOMACH SURGERY PROCEDURE UNLISTED       COLONOSCOPY  5/10/2012    Procedure:COLONOSCOPY; screening colonoscopy; Surgeon:MILLA VEGA; [facility]     COLOSTOMY  2000     COLOSTOMY       HC COLONOSCOPY THRU STOMA, DIAGNOSTIC  2002    normal per report-no records available-records destroyed     HYSTERECTOMY, PAP NO LONGER INDICATED      done in [place]-cervical cancer     IMPLANT STIMULATOR SACRAL NERVE STAGE ONE Right 3/10/2015    Procedure: IMPLANT STIMULATOR SACRAL NERVE STAGE ONE;  Surgeon: Teodora Yusuf MD;  [facility]     IMPLANT STIMULATOR SACRAL NERVE STAGE TWO Right 3/31/2015    Procedure: IMPLANT STIMULATOR SACRAL NERVE STAGE TWO;  Surgeon: Teodora Yusuf MD;  [facility]     KNEE SURGERY       SALPINGO OOPHORECTOMY,R/L/JENNIFER      Salpingo Oophorectomy, RT/LT/JENNIFER       Family History   Problem Relation Age of Onset     DIABETES Mother      CEREBROVASCULAR DISEASE Mother      DIABETES Father      Hypertension Father      CANCER Maternal Grandfather      CANCER Paternal Grandfather      DIABETES Brother      DIABETES Sister      Thyroid Disease Daughter      Thyroid Disease Sister      Multiple Sclerosis Daughter      Glaucoma No family hx of      Macular Degeneration No family hx of        Social History   Substance Use Topics     Smoking status: Former Smoker     Quit date: 8/9/2016     Smokeless tobacco: Never Used     Alcohol use 0.0 oz/week     0 Standard drinks or equivalent per week      Comment: social occasions       Objective:    Pulse 80  Wt 196 lb (88.9 kg)  SpO2 97%  BMI 34.73 kg/m2,  Pulses are palpable +2/4 DP & PT bilateral.  CRT < 3 seconds bilateral.  Toes warm to touch.  [place] reflex equal and symmetrical bilateral.   sensation to light touch is intact.  Decent muscle strength and ROM  to all areas tested and no signs of tendon rupture.  Normal arch.  Normal ROM all forefoot and rearfoot joints.  Pain upon palpation to diaphyseal bone of left 5th metatarsal.  Edema and echymosis noted.  No erythema.  No sign of ulceration, infection, or drainage.      X-rays-left fifth metatarsal oblique fracture still not healed    Assessment:  5th met fracture left foot    Plan:  X-rays taken today, weightbearing.  Clinically better, but not solid healing yet on x-ray.  Continue  NWB at all times so distal fragment does not elevate.  She is in winter boots today because she is afraid of falling on ice.  Minimal walking in these.  Continue cam walker at home at all times.  When not moving patient will take this off and do ROM to prevent blood clot and joint stiffness.  Patient will not sleep with this on.   Discussed this will be slower to heal because of poorly controlled diabetes.  If displacement or signs of slow healing pt may require surgery. F/U 3 wks for repeat x-ray.    Fracture care        Salvador Billingsley DPM, FACFAS

## 2018-01-25 NOTE — LETTER
1/25/2018         RE: Holly Muir  [address] [facility] [address]        Dear Colleague,    Thank you for referring your patient, Holly Muir, to the [facility]. Please see a copy of my visit note below.    Subjective:    1/4/18   Pt is seen today in consult from Emily Loving for 5th met fx left foot.  Was caused by missed step at home 3 wk ago.  Had swelling, pain, and ecchymosis.  Seen in clinic, xrays taken, and given PO shoe, crutches.  She has diabetes, poorly controlled.     1/25/18  Patient has less pain, less edema.  No pain in cam walker.  She has diabetes and peripheral neuropathy.         ROS:  denies numbness, erythema, shortness of breath       Allergies   Allergen Reactions     Morphine Sulfate Itching           Patient Active Problem List   Diagnosis     Hyperlipidemia LDL goal <100     Severe major depression with psychotic features (H)     History of cervical cancer     S/P colostomy (H)     Mixed incontinence urge and stress     Memory loss     Type 2 diabetes mellitus, uncontrolled A1C goal <8%     Obesity     Pain in joint, hand     Trigger finger, acquired     Synovitis and tenosynovitis     Dermatochalasis     Presbyopia     OCD (obsessive compulsive disorder)     HTN, goal below 140/90     Cataracts, both eyes     Health Care Home     Lumbago     Sprain of lumbar region     Generalized osteoarthrosis, unspecified site     Rotator cuff impingement syndrome     Hypoglycemia due to insulin     Encounter for long-term (current) use of insulin (H)     Colostomy, evaluate (H)     Urinary retention     ACP (advance care planning)     Bilateral low back pain without sciatica     Anxiety     Fecal incontinence due to anorectal disorder     Visual hallucination     Type 2 diabetes mellitus with hyperglycemia (H)       Past Medical History:   Diagnosis Date     Cataracts, both eyes 5/8/2013     Cervical cancer (H)      Diabetes      Diabetic retinopathy (H)       HTN      Inflammatory arthritis      Major depression      Memory loss      Nephropathy      OA (osteoarthritis) of knee 9/11/2013     Other and unspecified hyperlipidemia      Pterygium eye      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       Past Surgical History:   Procedure Laterality Date     ARTHROSCOPY KNEE RT/LT      LT     BREAST LUMPECTOMY, RT/LT      LT-benign      C EXCIS PTERYGIUM  10/08    [facility]     C STOMACH SURGERY PROCEDURE UNLISTED       COLONOSCOPY  5/10/2012    Procedure:COLONOSCOPY; screening colonoscopy; Surgeon:MILLA VEGA; Location:[facility] OR     COLOSTOMY  2000     COLOSTOMY       HC COLONOSCOPY THRU STOMA, DIAGNOSTIC  2002    normal per report-no records available-records destroyed     HYSTERECTOMY, PAP NO LONGER INDICATED      done in [place]-cervical cancer     IMPLANT STIMULATOR SACRAL NERVE STAGE ONE Right 3/10/2015    Procedure: IMPLANT STIMULATOR SACRAL NERVE STAGE ONE;  Surgeon: Teodora Yusuf MD;  [facility]     IMPLANT STIMULATOR SACRAL NERVE STAGE TWO Right 3/31/2015    Procedure: IMPLANT STIMULATOR SACRAL NERVE STAGE TWO;  Surgeon: Teodora Yusuf MD;  Location[facility] OR     KNEE SURGERY       SALPINGO OOPHORECTOMY,R/L/JENNIFER      Salpingo Oophorectomy, RT/LT/JENNIFER       Family History   Problem Relation Age of Onset     DIABETES Mother      CEREBROVASCULAR DISEASE Mother      DIABETES Father      Hypertension Father      CANCER Maternal Grandfather      CANCER Paternal Grandfather      DIABETES Brother      DIABETES Sister      Thyroid Disease Daughter      Thyroid Disease Sister      Multiple Sclerosis Daughter      Glaucoma No family hx of      Macular Degeneration No family hx of        Social History   Substance Use Topics     Smoking status: Former Smoker     Quit date: 8/9/2016     Smokeless tobacco: Never Used     Alcohol use 0.0 oz/week     0 Standard drinks or equivalent per week      Comment: social occasions       Objective:    Pulse 80  Wt 196 lb  (88.9 kg)  SpO2 97%  BMI 34.73 kg/m2,  Pulses are palpable +2/4 DP & PT bilateral.  CRT < 3 seconds bilateral.  Toes warm to touch.  Lukasz reflex equal and symmetrical bilateral.   sensation to light touch is intact.  Decent muscle strength and ROM to all areas tested and no signs of tendon rupture.  Normal arch.  Normal ROM all forefoot and rearfoot joints.  Pain upon palpation to diaphyseal bone of left 5th metatarsal.  Edema and echymosis noted.  No erythema.  No sign of ulceration, infection, or drainage.      X-rays-left fifth metatarsal oblique fracture still not healed    Assessment:  5th met fracture left foot    Plan:  X-rays taken today, weightbearing.  Clinically better, but not solid healing yet on x-ray.  Continue  NWB at all times so distal fragment does not elevate.  She is in winter boots today because she is afraid of falling on ice.  Minimal walking in these.  Continue cam walker at home at all times.  When not moving patient will take this off and do ROM to prevent blood clot and joint stiffness.  Patient will not sleep with this on.   Discussed this will be slower to heal because of poorly controlled diabetes.  If displacement or signs of slow healing pt may require surgery. F/U 3 wks for repeat x-ray.    Fracture care        Salvador Billingsley DPM, FACFAS      Again, thank you for allowing me to participate in the care of your patient.        Sincerely,        Salvador Billingsley DPM

## 2018-01-25 NOTE — MR AVS SNAPSHOT
After Visit Summary   1/25/2018    Holly Muir    MRN: [id number]           Patient Information     Date Of Birth          1955        Visit Information        Provider Department      1/25/2018 1:00 PM Salvador Billingsley DPM [facility]        Today's Diagnoses     Nondisplaced fracture of fifth left metatarsal bone    -  1      Care Instructions    We wish you continued good healing. If you have any questions or concerns, please do not hesitate to contact us at [phone]      Please remember to call and schedule a follow up appointment if one was recommended at your earliest convenience.   PODIATRY CLINIC HOURS  TELEPHONE NUMBER    Dr. Salvador Billingsley D.P.M FAC FAS    Clinics:  [facility]        Honey Larios MA  Medical Assistant  Tuesday 1PM-6PM  [place]  Wednesday 7AM-2PM  [place]  Thursday 10AM-6PM  [place]  Friday 7AM-345PM  [place]  Specialty schedulers:   [phone] to make an appointment with any Specialty Provider.        Urgent Care locations:    [facility] Monday-Friday 5 pm - 9 pm. Saturday-Sunday 9 am -5pm    Monday-Friday 11 am - 9 pm Saturday 9 am - 5 pm     Monday-Sunday 12 noon-8PM [phone]     If you need a medication refill, please contact us you may need lab work and/or a follow up visit prior to your refill (i.e. Antifungal medications).    [employer] (secure e-mail communication and access to your chart) to send a message or to make an appointment.    If MRI needed please call [facility] at [phone]        Weight management plan: Patient was referred to their PCP to discuss a diet and exercise plan.            Follow-ups after your visit        Your next 10 appointments already scheduled     Jan 25, 2018  1:00 PM CST   Return Visit with MARYBETH Jones [place]  "([facility])    [address]  [place] [address]   [phone]            2018 11:15 AM CST   Return Visit with Elizabeth Medrano MD, MG ENDO NURSE   [facility] ([facility])    [address] [address] [place] [id number] [phone]              Future tests that were ordered for you today     Open Future Orders        Priority Expected Expires Ordered    XR Foot Left G/E 3 Views Routine 2018            Who to contact     If you have questions or need follow up information about today's clinic visit or your schedule please contact [facility] directly at [phone].  Normal or non-critical lab and imaging results will be communicated to you by MyChart, letter or phone within 4 business days after the clinic has received the results. If you do not hear from us within 7 days, please contact the clinic through [employer] or phone. If you have a critical or abnormal lab result, we will notify you by phone as soon as possible.  Submit refill requests through [employer] or call your pharmacy and they will forward the refill request to us. Please allow 3 business days for your refill to be completed.          Additional Information About Your Visit        [employer] Information     [employer] lets you send messages to your doctor, view your test results, renew your prescriptions, schedule appointments and more. To sign up, go to [email or web address] . Click on \"Log in\" on the left side of the screen, which will take you to the Welcome page. Then click on \"Sign up Now\" on the right side of the page.     You will be asked to enter the access code listed below, as well as some personal information. Please follow the directions to create your username and password.     Your access code is: [email or web address]  Expires: 3/8/2018 11:32 AM     Your access code will  in 90 days. If you need help or a new code, please call your " [facility] or [phone].        Care EveryWhere ID     This is your Care EveryWhere ID. This could be used by other organizations to access your [place] medical records  [id number]        Your Vitals Were     Pulse Pulse Oximetry BMI (Body Mass Index)             80 97% 34.73 kg/m2          Blood Pressure from Last 3 Encounters:   12/31/17 110/60   12/08/17 113/70   12/05/17 126/67    Weight from Last 3 Encounters:   01/25/18 196 lb (88.9 kg)   01/04/18 197 lb (89.4 kg)   12/31/17 197 lb 8 oz (89.6 kg)                 Today's Medication Changes          These changes are accurate as of 1/25/18 12:55 PM.  If you have any questions, ask your nurse or doctor.               These medicines have changed or have updated prescriptions.        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 (pt-stated)     Notes - Note created  12/9/2015 10:26 AM by Randy Suarez RN    As of today's date 12/9/2015 goal is met at 0 - 25%.   Goal Status:  Active          Equal Access to Services     [facility] : Hadii dharmesh Caban, waaxda luqadaha, qaybta kaalkim morales. So [facility] [phone].    ATENCIÓN: Si habla español, tiene a lopez disposición servicios gratuitos de asistencia lingüística. Llame al [phone].    We comply with applicable federal civil rights laws and [place]  laws. We do not discriminate on the basis of race, color, national origin, age, disability, sex, sexual orientation, or gender identity.            Thank you!     Thank you for choosing [facility]  for your care. Our goal is always to provide you with excellent care. Hearing back from our patients is one way we can continue to improve our services. Please take a few minutes to complete the written survey that you may receive in the mail after your visit with us. Thank you!             Your Updated Medication List - Protect others around you: Learn how to safely use, store and throw away your medicines at www.disposemymeds.org.          This list is accurate as of 1/25/18 12:55 PM.  Always use your most recent med list.                   Brand Name Dispense Instructions for use Diagnosis    ARIPiprazole 20 MG tablet    ABILIFY     Take 1 tablet (20 mg) by mouth daily Prescribed by Dr. Isael Jim[facility] at [facility]    Visual hallucination, Severe recurrent major depressive disorder with psychotic features (H)       B-COMPLEX/B-12 PO      Take 1 tablet by mouth daily.        blood glucose monitoring lancets     9 Box    Use to test blood sugar 8 times daily or as directed.    Type 2 diabetes mellitus, uncontrolled (H)       blood glucose monitoring test strip    ACCU-CHEK CELIA    250 each    Use to test blood sugars 8 times daily or as directed.    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)       BusPIRone HCl 30 MG Tabs     60 tablet    TAKE 1 TABLET BY MOUTH TWICE A DAY    Anxiety       [facility] ASPIRIN ADULT LOW DOSE 81 MG chewable tablet   Generic drug:  aspirin     90 tablet    TAKE 1 TABLET (81 MG) BY MOUTH DAILY ***OTC- NOT COVERED BY INS***    Uncontrolled diabetes mellitus (H)       enalapril 2.5  MG tablet    VASOTEC    90 tablet    TAKE 1 TABLET BY MOUTH EVERY DAY    Diabetes mellitus with background retinopathy (H)       gabapentin 300 MG capsule    NEURONTIN    360 capsule    1 capsule twice a day during the day and 2 capsules at night time    Diabetic polyneuropathy associated with type 2 diabetes mellitus (H)       ibuprofen 800 MG tablet    ADVIL/MOTRIN    30 tablet    Take 1 tablet (800 mg) by mouth every 8 hours as needed for moderate pain (with food)    Closed fracture of fifth metatarsal bone of left foot, physeal involvement unspecified, initial encounter       insulin aspart 100 UNIT/ML injection    NovoLOG FLEXPEN    30 mL    Take as directed with meals. Total daily dose approx 30 units.    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, with long-term current use of insulin (H)       insulin pen needle 31G X 5 MM     3 each    Use four times 4 day times a day        metFORMIN 1000 MG tablet    GLUCOPHAGE    60 tablet    TAKE 1 TABLET BY MOUTH 2 TIMES DAILY WITH MEALS    Uncontrolled type 2 diabetes mellitus with hyperglycemia, with long-term current use of insulin (H)       Multi-vitamin Tabs tablet   Generic drug:  multivitamin, therapeutic with minerals      1 TABLET DAILY        nystatin 304904 UNIT/GM Powd    MYCOSTATIN    60 g    Apply 1 dose topically 3 times daily as needed    Skin rash       omeprazole 20 MG CR capsule    priLOSEC    180 capsule    TAKE 2 CAPSULES BY MOUTH DAILY TAKE 30-60 MINUTES BEFORE A MEAL.    Gastroesophageal reflux disease without esophagitis       order for DME     1 Units    Equipment being ordered: Cane    Knee osteoarthritis       * order for DME     3 Month    Equipment being ordered:  Ostomy supplies.  Drain Pouch by Valarie  #.    Also needs Barrier by Valarie #.    S/P colostomy (H)       * order for DME     1 Device    by Device route continuous Air walker -  Use as instructed    Ankle injury, left, initial encounter, Abnormal x-ray       * order for DME     1 Device    Equipment being ordered: Knee walker     Closed nondisplaced fracture of fifth metatarsal bone of left foot, initial encounter       PARoxetine 20 MG tablet    PAXIL     TAKE 3 TABLETS AT BEDTIME (Prescribed by Dr. Isael Jim[facility]nez at [facility])    Severe major depression with psychotic features (H)       pioglitazone 30 MG tablet    ACTOS    90 tablet    Take 1 tablet (30 mg) by mouth daily    Uncontrolled type 2 diabetes mellitus with hyperglycemia, with long-term current use of insulin (H), Insulin resistance       * propranolol 60 MG 24 hr capsule    INDERAL [place]    60 capsule    Take 1 capsule (60 mg) by mouth daily May take a second dose if tremor recurs.    Tremor       * propranolol 60 MG 24 hr capsule    INDERAL [place]    60 capsule    Take 1 capsule (60 mg) by mouth See Admin Instructions Take one po q day.  May repeat dose if tremor persists.    Tremor       simvastatin 20 MG tablet    ZOCOR    30 tablet    TAKE 1 TABLET (20 MG) BY MOUTH AT BEDTIME    High cholesterol       traMADol 50 MG tablet    ULTRAM    60 tablet    TAKE 1-2 TABLETS BY MOUTH EVERY DAY AS NEEDED FOR PAIN    Arthritis of knee, Arthralgia of both knees       Universal Remover Wipes Misc     1 Each    uses to remove barrier from colostomy bag at time of change    Colostomy       vitamin D 1000 UNITS capsule     90 capsule    Take 2,000 to 4,000 Units per day        * Notice:  This list has 5 medication(s) that are the same as other medications prescribed for you. Read the directions carefully, and ask your doctor or other care provider to review them with you.

## 2018-01-29 NOTE — TELEPHONE ENCOUNTER
[facility] Call Center    Phone Message    May a detailed message be left on voicemail: yes    Reason for Call:Patient is on last pen, will be out shortly    Action Taken: Message routed to:  Primary Care p [phone]

## 2018-01-29 NOTE — TELEPHONE ENCOUNTER
Pending Prescriptions:                       Disp   Refills    insulin aspart (NOVOLOG FLEXPEN) 100 UNIT*30 mL  0            Sig: Take as directed with meals. Total daily dose           approx 30 units.    Last OV 12/4/17 with Dr. Tiffany King. No future OV scheduled with primary care department at this time.  Bere Patino, [facility]

## 2018-01-29 NOTE — TELEPHONE ENCOUNTER
The novolog flex pen was ordered by endocrine.  Routing to endocrine.    Routing refill request to provider for review/approval because:  Labs not current:    Short Acting Insulin Protocol Failed1/29 3:54 PM   LDL on file in past 12 months    Microalbumin on file in past 12 months    Serum creatinine on file in past 12 months    HgbA1C in past 3 or 6 months       LDL Cholesterol Calculated   Date Value Ref Range Status   12/19/2016 21 <100 mg/dL Final     Comment:     Desirable:       <100 mg/dl   ]      Lab Results   Component Value Date    MICROL 18 12/19/2016     No results found for: MICROALBUMIN      Creatinine   Date Value Ref Range Status   12/19/2016 0.88 0.52 - 1.04 mg/dL Final   ]    Lab Results   Component Value Date    A1C 10.9 09/28/2017    A1C 12.0 05/26/2017         Last office visit:  12/4/17    Next 5 appointments (look out 90 days)     Feb 09, 2018 11:15 AM CST   Return Visit with Elizabeth Medrano MD, MG ENDO NURSE   [facility]    [address] [place] [address]   [phone]               Zoraida Min RN,   [facility]

## 2018-02-15 ENCOUNTER — RADIANT APPOINTMENT (OUTPATIENT)
Dept: GENERAL RADIOLOGY | Facility: CLINIC | Age: 63
End: 2018-02-15
Attending: PODIATRIST
Payer: MEDICARE

## 2018-02-15 ENCOUNTER — OFFICE VISIT (OUTPATIENT)
Dept: PODIATRY | Facility: CLINIC | Age: 63
End: 2018-02-15
Payer: MEDICARE

## 2018-02-15 VITALS — WEIGHT: 200 LBS | OXYGEN SATURATION: 100 % | BODY MASS INDEX: 35.44 KG/M2 | HEART RATE: 77 BPM

## 2018-02-15 DIAGNOSIS — S92.355A CLOSED NONDISPLACED FRACTURE OF FIFTH METATARSAL BONE OF LEFT FOOT, INITIAL ENCOUNTER: Primary | ICD-10-CM

## 2018-02-15 DIAGNOSIS — S92.355A CLOSED NONDISPLACED FRACTURE OF FIFTH METATARSAL BONE OF LEFT FOOT, INITIAL ENCOUNTER: ICD-10-CM

## 2018-02-15 PROCEDURE — 73630 X-RAY EXAM OF FOOT: CPT | Mod: LT

## 2018-02-15 PROCEDURE — 99207 ZZC FRACTURE CARE IN GLOBAL PERIOD: CPT | Performed by: PODIATRIST

## 2018-02-15 NOTE — PROGRESS NOTES
Subjective:    1/4/18   Pt is seen today in consult from Emily Loving for 5th met fx left foot.  Was caused by missed step at home 3 wk ago.  Had swelling, pain, and ecchymosis.  Seen in clinic, xrays taken, and given PO shoe, crutches.  She has diabetes, poorly controlled.     1/25/18  Patient has less pain, less edema.  No pain in cam walker.  She has diabetes and peripheral neuropathy.      2/15/18  Patient has no pain now waling in cam walker.   Edema almost resolved.  No new complaints.        ROS:  denies numbness, erythema, shortness of breath       Allergies   Allergen Reactions     Morphine Sulfate Itching           Patient Active Problem List   Diagnosis     Hyperlipidemia LDL goal <100     Severe major depression with psychotic features (H)     History of cervical cancer     S/P colostomy (H)     Mixed incontinence urge and stress     Memory loss     Type 2 diabetes mellitus, uncontrolled A1C goal <8%     Obesity     Pain in joint, hand     Trigger finger, acquired     Synovitis and tenosynovitis     Dermatochalasis     Presbyopia     OCD (obsessive compulsive disorder)     HTN, goal below 140/90     Cataracts, both eyes     Health Care Home     Lumbago     Sprain of lumbar region     Generalized osteoarthrosis, unspecified site     Rotator cuff impingement syndrome     Hypoglycemia due to insulin     Encounter for long-term (current) use of insulin (H)     Colostomy, evaluate (H)     Urinary retention     ACP (advance care planning)     Bilateral low back pain without sciatica     Anxiety     Fecal incontinence due to anorectal disorder     Visual hallucination     Type 2 diabetes mellitus with hyperglycemia (H)       Past Medical History:   Diagnosis Date     Cataracts, both eyes 5/8/2013     Cervical cancer (H)      Diabetes      Diabetic retinopathy (H)      HTN      Inflammatory arthritis      Major depression      Memory loss      Nephropathy      OA (osteoarthritis) of knee 9/11/2013     Other and  unspecified hyperlipidemia      Pterygium eye      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       Past Surgical History:   Procedure Laterality Date     ARTHROSCOPY KNEE RT/LT      LT     BREAST LUMPECTOMY, RT/LT      LT-benign      C EXCIS PTERYGIUM  10/08    [facility]     C STOMACH SURGERY PROCEDURE UNLISTED       COLONOSCOPY  5/10/2012    Procedure:COLONOSCOPY; screening colonoscopy; Surgeon:MILLA VEGA; Location:[facility]     COLOSTOMY  2000     COLOSTOMY       HC COLONOSCOPY THRU STOMA, DIAGNOSTIC  2002    normal per report-no records available-records destroyed     HYSTERECTOMY, PAP NO LONGER INDICATED      done in [place]-cervical cancer     IMPLANT STIMULATOR SACRAL NERVE STAGE ONE Right 3/10/2015    Procedure: IMPLANT STIMULATOR SACRAL NERVE STAGE ONE;  Surgeon: Teodora Yusuf MD;  [facility] OR     IMPLANT STIMULATOR SACRAL NERVE STAGE TWO Right 3/31/2015    Procedure: IMPLANT STIMULATOR SACRAL NERVE STAGE TWO;  Surgeon: Teodora Yusuf MD;  Location: [facility] OR     KNEE SURGERY       SALPINGO OOPHORECTOMY,R/L/JENNIFER      Salpingo Oophorectomy, RT/LT/JENNIFER       Family History   Problem Relation Age of Onset     DIABETES Mother      CEREBROVASCULAR DISEASE Mother      DIABETES Father      Hypertension Father      CANCER Maternal Grandfather      CANCER Paternal Grandfather      DIABETES Brother      DIABETES Sister      Thyroid Disease Daughter      Thyroid Disease Sister      Multiple Sclerosis Daughter      Glaucoma No family hx of      Macular Degeneration No family hx of        Social History   Substance Use Topics     Smoking status: Former Smoker     Quit date: 8/9/2016     Smokeless tobacco: Never Used     Alcohol use 0.0 oz/week     0 Standard drinks or equivalent per week      Comment: social occasions       Objective:    There were no vitals taken for this visit.,  Pulses are palpable +2/4 DP & PT bilateral.  CRT < 3 seconds bilateral.  Toes warm to touch.  Stephan reflex equal and  symmetrical bilateral.   sensation to light touch is intact.  Decent muscle strength and ROM to all areas tested and no signs of tendon rupture.  Normal arch.  Normal ROM all forefoot and rearfoot joints.  No pain upon palpation to diaphyseal bone of left 5th metatarsal.  No ecchymosis and edema almost all resolved.  No erythema.  No sign of ulceration, infection, or drainage.      X-rays- no change in fracture.  Increased bone callus    Assessment:  5th met fracture left foot    Plan:  X-rays taken today, weightbearing.  Clinically better and increased bone callus on x-ray.  Patient will start tomorrow walking in good supportive shoe.  If there is no pain or edema then will slowly increase time in shoe 1-2 hours per day until walking all day in good shoe.  If patient has pain or edema back in cam walker for 5 days and then will try again.  No running, jogging, or high impact activities next 7 weeks.  Return to clinic prn.  Fracture care        aSlvador Billingsley DPM, FACFAS

## 2018-02-15 NOTE — MR AVS SNAPSHOT
After Visit Summary   2/15/2018    Holly Muir    MRN: [id number]           Patient Information     Date Of Birth          1955        Visit Information        Provider Department      2/15/2018 11:15 AM Salvador Billingsley, MARYBETH [facility]        Today's Diagnoses     Closed nondisplaced fracture of fifth metatarsal bone of left foot, initial encounter    -  1      Care Instructions    We wish you continued good healing. If you have any questions or concerns, please do not hesitate to contact us at [phone]    Please remember to call and schedule a follow up appointment if one was recommended at your earliest convenience.   PODIATRY CLINIC HOURS  TELEPHONE NUMBER    Dr. Salvador ANDERSONPSTEPHANIE FAC FAS    Clinics:  [facility]    [facility]   Tuesday 1PM-6PM  [place]  Wednesday 7AM-2PM  [place]  Thursday 10AM-6PM  [place]  Friday 7AM-3PM  [place]  Specialty schedulers:   [phone] to make an appointment with any Specialty Provider.        Urgent Care locations:    [facility] Monday-Friday 5 pm - 9 pm. Saturday-Sunday 9 am -5pm    Monday-Friday 11 am - 9 pm Saturday 9 am - 5 pm     Monday-Sunday 12 noon-8PM [phone]     If you need a medication refill, please contact us you may need lab work and/or a follow up visit prior to your refill (i.e. Antifungal medications).    [employer] (secure e-mail communication and access to your chart) to send a message or to make an appointment.    If MRI needed please call [place] Garcia at [phone]        Weight management plan: Patient was referred to their PCP to discuss a diet and exercise plan.            Follow-ups after your visit        Your next 10 appointments already scheduled     Feb 15, 2018 11:00 AM CST   XR FOOT LEFT G/E 3 VIEWS with FZXR1   [facility]  "([facility])    [address]  [place] [id number] [phone]           Please bring a list of your current medicines to your exam. (Include vitamins, minerals and over-thecounter medicines.) Leave your valuables at home.  Tell your doctor if there is a chance you may be pregnant.  You do not need to do anything special for this exam.            Feb 15, 2018 11:15 AM CST   Return Visit with Salvador Billingsley DPM   [facility] ([facility]    [address] [place] [address]  [place] [id number] [phone]              Future tests that were ordered for you today     Open Future Orders        Priority Expected Expires Ordered    XR Foot Left G/E 3 Views Routine 2/15/2018 2/15/2019 2/15/2018            Who to contact     If you have questions or need follow up information about today's clinic visit or your schedule please contact [facility] directly at [phone].  Normal or non-critical lab and imaging results will be communicated to you by MyChart, letter or phone within 4 business days after the clinic has received the results. If you do not hear from us within 7 days, please contact the clinic through MyChart or phone. If you have a critical or abnormal lab result, we will notify you by phone as soon as possible.  Submit refill requests through [employer] or call your pharmacy and they will forward the refill request to us. Please allow 3 business days for your refill to be completed.          Additional Information About Your Visit        PassHathart Information     [employer] lets you send messages to your doctor, view your test results, renew your prescriptions, schedule appointments and more. To sign up, go to [email or web address] . Click on \"Log in\" on the left side of the screen, which will take you to the Welcome page. Then click on \"Sign up Now\" on the right side of the page.     You will be asked to enter the access code listed below, as well " as some personal information. Please follow the directions to create your username and password.     Your access code is: [email or web address]  Expires: 3/8/2018 11:32 AM     Your access code will  in 90 days. If you need help or a new code, please call your [place] clinic or [phone].        Care EveryWhere ID     This is your Care EveryWhere ID. This could be used by other organizations to access your [facility] medical records  [id number]        Your Vitals Were     Pulse Pulse Oximetry BMI (Body Mass Index)             77 100% 35.44 kg/m2          Blood Pressure from Last 3 Encounters:   17 110/60   17 113/70   17 126/67    Weight from Last 3 Encounters:   02/15/18 200 lb (90.7 kg)   18 196 lb (88.9 kg)   18 197 lb (89.4 kg)                 Today's Medication Changes          These changes are accurate as of 2/15/18 10:57 AM.  If you have any questions, ask your nurse or doctor.               These medicines have changed or have updated prescriptions.        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 (pt-stated)     Notes - Note created  2015 10:26 AM by Randy Suarez RN    As of today's date 2015 goal is met at 0 - 25%.   Goal Status:  Active          Equal Access to Services     KATRIN RIGGS AH: Hadii dharmesh Caban, waaxda luqbethany, qaybta kim campo  roula forbes derki rosales'aan ah. So [facility] [phone].    ATENCIÓN: Si ramu medrano, tiene a lopez disposición servicios gratuitos de asistencia lingüística. Garrick al [phone].    We comply with applicable federal civil rights laws and [place] laws. We do not discriminate on the basis of race, color, national origin, age, disability, sex, sexual orientation, or gender identity.            Thank you!     Thank you for choosing [facility]  for your care. Our goal is always to provide you with excellent care. Hearing back from our patients is one way we can continue to improve our services. Please take a few minutes to complete the written survey that you may receive in the mail after your visit with us. Thank you!             Your Updated Medication List - Protect others around you: Learn how to safely use, store and throw away your medicines at www.disposemymeds.org.          This list is accurate as of 2/15/18 10:57 AM.  Always use your most recent med list.                   Brand Name Dispense Instructions for use Diagnosis    ARIPiprazole 20 MG tablet    ABILIFY     Take 1 tablet (20 mg) by mouth daily Prescribed by Dr. Isael Jim[facility]nez at [facility]    Visual hallucination, Severe recurrent major depressive disorder with psychotic features (H)       B-COMPLEX/B-12 PO      Take 1 tablet by mouth daily.        blood glucose monitoring lancets     9 Box    Use to test blood sugar 8 times daily or as directed.    Type 2 diabetes mellitus, uncontrolled (H)       blood glucose monitoring test strip    ACCU-CHEK CELIA    250 each    Use to test blood sugars 8 times daily or as directed.    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)       BusPIRone HCl 30 MG Tabs     60 tablet    TAKE 1 TABLET  BY MOUTH TWICE A DAY    Anxiety       [facility] ASPIRIN ADULT LOW DOSE 81 MG chewable tablet   Generic drug:  aspirin     90 tablet    TAKE 1 TABLET (81 MG) BY MOUTH DAILY ***OTC- NOT COVERED BY INS***    Uncontrolled diabetes mellitus (H)       enalapril 2.5 MG tablet    VASOTEC    90 tablet    TAKE 1 TABLET BY MOUTH EVERY DAY    Diabetes mellitus with background retinopathy (H)       gabapentin 300 MG capsule    NEURONTIN    360 capsule    TAKE ONE CAPSULE BY MOUTH TWICE A DAY TAKE 2 CAPSULES BY MOUTH IN THE EVENING    Diabetic polyneuropathy associated with type 2 diabetes mellitus (H)       ibuprofen 800 MG tablet    ADVIL/MOTRIN    30 tablet    Take 1 tablet (800 mg) by mouth every 8 hours as needed for moderate pain (with food)    Closed fracture of fifth metatarsal bone of left foot, physeal involvement unspecified, initial encounter       insulin aspart 100 UNIT/ML injection    NovoLOG FLEXPEN    30 mL    Take as directed with meals. Total daily dose approx 30 units.    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, with long-term current use of insulin (H)       insulin pen needle 31G X 5 MM     3 each    Use four times 4 day times a day        metFORMIN 1000 MG tablet    GLUCOPHAGE    60 tablet    TAKE 1 TABLET BY MOUTH 2 TIMES DAILY WITH MEALS    Uncontrolled type 2 diabetes mellitus with hyperglycemia, with long-term current use of insulin (H)       Multi-vitamin Tabs tablet   Generic drug:  multivitamin, therapeutic with minerals      1 TABLET DAILY        nystatin [id number] UNIT/GM Powd    MYCOSTATIN    60 g    Apply 1 dose topically 3 times daily as needed    Skin rash       omeprazole 20 MG CR capsule    priLOSEC    180 capsule    TAKE 2 CAPSULES BY MOUTH DAILY TAKE 30-60 MINUTES  BEFORE A MEAL.    Gastroesophageal reflux disease without esophagitis       order for DME     1 Units    Equipment being ordered: Cane    Knee osteoarthritis       * order for DME     3 Month    Equipment being ordered:  Ostomy supplies.  Drain Pouch by Valarie #.    Also needs Barrier by Valarie #.    S/P colostomy (H)       * order for DME     1 Device    by Device route continuous Air walker -  Use as instructed    Ankle injury, left, initial encounter, Abnormal x-ray       * order for DME     1 Device    Equipment being ordered: Knee walker     Closed nondisplaced fracture of fifth metatarsal bone of left foot, initial encounter       PARoxetine 20 MG tablet    PAXIL     TAKE 3 TABLETS AT BEDTIME (Prescribed by Dr. Isael [facility] at [facility])    Severe major depression with psychotic features (H)       pioglitazone 30 MG tablet    ACTOS    90 tablet    Take 1 tablet (30 mg) by mouth daily    Uncontrolled type 2 diabetes mellitus with hyperglycemia, with long-term current use of insulin (H), Insulin resistance       * propranolol 60 MG 24 hr capsule    INDERAL LA    60 capsule    Take 1 capsule (60 mg) by mouth daily May take a second dose if tremor recurs.    Tremor       * propranolol 60 MG 24 hr capsule    INDERAL LA    60 capsule    Take 1 capsule (60 mg) by mouth See Admin Instructions Take one po q day.  May repeat dose if tremor persists.    Tremor       simvastatin 20 MG tablet    ZOCOR    30 tablet    TAKE 1 TABLET (20 MG) BY MOUTH AT BEDTIME    High cholesterol       traMADol 50 MG tablet    ULTRAM    60 tablet    TAKE 1-2 TABLETS BY MOUTH EVERY DAY AS NEEDED FOR PAIN    Arthritis of knee, Arthralgia of both knees       Universal Remover Wipes Misc     1 Each    uses to remove barrier from colostomy bag at time of change    Colostomy       vitamin D 1000 UNITS capsule     90 capsule    Take 2,000 to 4,000 Units per day        * Notice:  This list has 5 medication(s) that  are the same as other medications prescribed for you. Read the directions carefully, and ask your doctor or other care provider to review them with you.

## 2018-02-15 NOTE — LETTER
2/15/2018         RE: Holly Muir  [address] [facility] [address]        Dear Colleague,    Thank you for referring your patient, Holly Muir, to the [facility]. Please see a copy of my visit note below.    Subjective:    1/4/18   Pt is seen today in consult from Emily Loving for 5th met fx left foot.  Was caused by missed step at home 3 wk ago.  Had swelling, pain, and ecchymosis.  Seen in clinic, xrays taken, and given PO shoe, crutches.  She has diabetes, poorly controlled.     1/25/18  Patient has less pain, less edema.  No pain in cam walker.  She has diabetes and peripheral neuropathy.      2/15/18  Patient has no pain now waling in cam walker.   Edema almost resolved.  No new complaints.        ROS:  denies numbness, erythema, shortness of breath       Allergies   Allergen Reactions     Morphine Sulfate Itching           Patient Active Problem List   Diagnosis     Hyperlipidemia LDL goal <100     Severe major depression with psychotic features (H)     History of cervical cancer     S/P colostomy (H)     Mixed incontinence urge and stress     Memory loss     Type 2 diabetes mellitus, uncontrolled A1C goal <8%     Obesity     Pain in joint, hand     Trigger finger, acquired     Synovitis and tenosynovitis     Dermatochalasis     Presbyopia     OCD (obsessive compulsive disorder)     HTN, goal below 140/90     Cataracts, both eyes     Health Care Home     Lumbago     Sprain of lumbar region     Generalized osteoarthrosis, unspecified site     Rotator cuff impingement syndrome     Hypoglycemia due to insulin     Encounter for long-term (current) use of insulin (H)     Colostomy, evaluate (H)     Urinary retention     ACP (advance care planning)     Bilateral low back pain without sciatica     Anxiety     Fecal incontinence due to anorectal disorder     Visual hallucination     Type 2 diabetes mellitus with hyperglycemia (H)       Past Medical History:   Diagnosis Date      Cataracts, both eyes 5/8/2013     Cervical cancer (H)      Diabetes      Diabetic retinopathy (H)      HTN      Inflammatory arthritis      Major depression      Memory loss      Nephropathy      OA (osteoarthritis) of knee 9/11/2013     Other and unspecified hyperlipidemia      Pterygium eye      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       Past Surgical History:   Procedure Laterality Date     ARTHROSCOPY KNEE RT/LT      LT     BREAST LUMPECTOMY, RT/LT      LT-benign      C EXCIS PTERYGIUM  10/08    [facility]     C STOMACH SURGERY PROCEDURE UNLISTED       COLONOSCOPY  5/10/2012    Procedure:COLONOSCOPY; screening colonoscopy; Surgeon:MILLA VEGA; Location:[facility] OR     COLOSTOMY  2000     COLOSTOMY       [facility] COLONOSCOPY THRU STOMA, DIAGNOSTIC  2002    normal per report-no records available-records destroyed     HYSTERECTOMY, PAP NO LONGER INDICATED      done in [place]-cervical cancer     IMPLANT STIMULATOR SACRAL NERVE STAGE ONE Right 3/10/2015    Procedure: IMPLANT STIMULATOR SACRAL NERVE STAGE ONE;  Surgeon: Teodora Yusuf MD;  Location: [facility] OR     IMPLANT STIMULATOR SACRAL NERVE STAGE TWO Right 3/31/2015    Procedure: IMPLANT STIMULATOR SACRAL NERVE STAGE TWO;  Surgeon: Teodora Yusuf MD;  Location: [facility] OR     KNEE SURGERY       SALPINGO OOPHORECTOMY,R/L/JENNIFER      Salpingo Oophorectomy, RT/LT/JENNIFER       Family History   Problem Relation Age of Onset     DIABETES Mother      CEREBROVASCULAR DISEASE Mother      DIABETES Father      Hypertension Father      CANCER Maternal Grandfather      CANCER Paternal Grandfather      DIABETES Brother      DIABETES Sister      Thyroid Disease Daughter      Thyroid Disease Sister      Multiple Sclerosis Daughter      Glaucoma No family hx of      Macular Degeneration No family hx of        Social History   Substance Use Topics     Smoking status: Former Smoker     Quit date: 8/9/2016     Smokeless tobacco: Never Used     Alcohol use 0.0 oz/week     0 Standard  drinks or equivalent per week      Comment: social occasions       Objective:    There were no vitals taken for this visit.,  Pulses are palpable +2/4 DP & PT bilateral.  CRT < 3 seconds bilateral.  Toes warm to touch.  Lukasz reflex equal and symmetrical bilateral.   sensation to light touch is intact.  Decent muscle strength and ROM to all areas tested and no signs of tendon rupture.  Normal arch.  Normal ROM all forefoot and rearfoot joints.  No pain upon palpation to diaphyseal bone of left 5th metatarsal.  No ecchymosis and edema almost all resolved.  No erythema.  No sign of ulceration, infection, or drainage.      X-rays- no change in fracture.  Increased bone callus    Assessment:  5th met fracture left foot    Plan:  X-rays taken today, weightbearing.  Clinically better and increased bone callus on x-ray.  Patient will start tomorrow walking in good supportive shoe.  If there is no pain or edema then will slowly increase time in shoe 1-2 hours per day until walking all day in good shoe.  If patient has pain or edema back in cam walker for 5 days and then will try again.  No running, jogging, or high impact activities next 7 weeks.  Return to clinic prn.  Fracture care        Salvador Billingsley DPM, FACFAS      Again, thank you for allowing me to participate in the care of your patient.        Sincerely,        Salvador Billingsley DPM

## 2018-02-15 NOTE — PATIENT INSTRUCTIONS
We wish you continued good healing. If you have any questions or concerns, please do not hesitate to contact us at [phone]    Please remember to call and schedule a follow up appointment if one was recommended at your earliest convenience.   PODIATRY CLINIC HOURS  TELEPHONE NUMBER    Dr. Salvador Billingsley D.P.M [facility]    Clinics:  [facility]    [facility]   Tuesday 1PM-6PM  [place]  Wednesday 7AM-2PM  [facility]  Thursday 10AM-6PM  [place]  Friday 7AM-3PM  [place]  Specialty schedulers:   [phone] to make an appointment with any Specialty Provider.        Urgent Care locations:    [facility] Monday-Friday 5 pm - 9 pm. Saturday-Sunday 9 am -5pm    Monday-Friday 11 am - 9 pm Saturday 9 am - 5 pm     Monday-Sunday 12 noon-8PM [phone]     If you need a medication refill, please contact us you may need lab work and/or a follow up visit prior to your refill (i.e. Antifungal medications).    [employer] (secure e-mail communication and access to your chart) to send a message or to make an appointment.    If MRI needed please call [place] Garcia at [phone]        Weight management plan: Patient was referred to their PCP to discuss a diet and exercise plan.

## 2018-02-21 ENCOUNTER — TELEPHONE (OUTPATIENT)
Dept: PEDIATRICS | Facility: CLINIC | Age: 63
End: 2018-02-21

## 2018-02-21 DIAGNOSIS — Z79.4 UNCONTROLLED TYPE 2 DIABETES MELLITUS WITH HYPERGLYCEMIA, WITH LONG-TERM CURRENT USE OF INSULIN (H): ICD-10-CM

## 2018-02-21 DIAGNOSIS — E11.65 UNCONTROLLED TYPE 2 DIABETES MELLITUS WITH HYPERGLYCEMIA, WITH LONG-TERM CURRENT USE OF INSULIN (H): ICD-10-CM

## 2018-02-21 DIAGNOSIS — F32.3 SEVERE MAJOR DEPRESSION WITH PSYCHOTIC FEATURES (H): Primary | ICD-10-CM

## 2018-02-21 NOTE — TELEPHONE ENCOUNTER
[facility] Call Center    Phone Message    May a detailed message be left on voicemail: yes    Reason for Call: Wanting to know if mult[facility]it[facility]? Patient's insurance may cover and will make sure they do not run out    Action Taken: Message routed to:  Primary Care p [phone]

## 2018-02-21 NOTE — TELEPHONE ENCOUNTER
MULTI-VITAMIN OR TABS      Last Written Prescription Date:  Historical   Last Fill Quantity: unknown,   # refills: 0  Last Office Visit: 12/4/17  Future Office visit:   No future appointment scheduled     Routing refill request to provider for review/approval because:  Medication is reported/historical    JERED Oconnor

## 2018-02-22 NOTE — TELEPHONE ENCOUNTER
Disp Refills Start End KARLA   metFORMIN (GLUCOPHAGE) 1000 MG tablet 60 tablet 0 2/12/2018  No   Sig: TAKE 1 TABLET BY MOUTH 2 TIMES DAILY WITH MEALS   Class: E-Prescribe   Notes to Pharmacy: Due for diabetes follow up/labs. Letter sent to patient.   Order: [id number]       Biguanide Agents Failed2/21 8:53 AM   Patient has documented LDL within the past 12 mos.    Patient has had a Microalbumin in the past 12 mos.    Patient has documented A1c within the specified period of time.    Blood pressure less than 140/90 in past 6 months    Patient is age 10 or older    Patient's CR is NOT>1.4 OR Patient's EGFR is NOT<45 within past 12 mos.    Patient does NOT have a diagnosis of CHF.    Patient is not pregnant    Patient has not had a positive pregnancy test within the past 12 mos.     Recent (6 mos) or future visit with authorizing provider's specialty     A kevin refill was sent on 2/12/2018. Same requesting pharmacy. Helena Dickson RN

## 2018-02-24 DIAGNOSIS — I10 HYPERTENSION GOAL BP (BLOOD PRESSURE) < 140/90: Primary | ICD-10-CM

## 2018-02-26 PROCEDURE — G0179 MD RECERTIFICATION HHA PT: HCPCS | Performed by: INTERNAL MEDICINE

## 2018-02-26 RX ORDER — MULTIPLE VITAMINS W/ MINERALS TAB 9MG-400MCG
1 TAB ORAL DAILY
Qty: 30 EACH | Refills: 11 | Status: SHIPPED | OUTPATIENT
Start: 2018-02-26 | End: 2019-01-09

## 2018-02-26 RX ORDER — ENALAPRIL MALEATE 2.5 MG/1
TABLET ORAL
Qty: 90 TABLET | Refills: 0 | Status: SHIPPED | OUTPATIENT
Start: 2018-02-26 | End: 2018-05-27

## 2018-02-26 NOTE — TELEPHONE ENCOUNTER
Enalapril 2.5 mg      Last Written Prescription Date:  11/27/17  Last Fill Quantity: 90,   # refills: 0  Last Office Visit: 12/4/17  Future Office visit:       Routing refill request to provider for review/approval because:  Associated DX.    Amairani Hernandez RN   [facility]

## 2018-02-27 NOTE — TELEPHONE ENCOUNTER
Please call patient. She is overdue for fasting labs and clinic follow up. Please schedule. I approved for 90 days with no refills. (needs 40 min)

## 2018-02-28 DIAGNOSIS — M25.562 ARTHRALGIA OF BOTH KNEES: ICD-10-CM

## 2018-02-28 DIAGNOSIS — M17.10 ARTHRITIS OF KNEE: ICD-10-CM

## 2018-02-28 DIAGNOSIS — M25.561 ARTHRALGIA OF BOTH KNEES: ICD-10-CM

## 2018-02-28 RX ORDER — TRAMADOL HYDROCHLORIDE 50 MG/1
TABLET ORAL
Qty: 60 TABLET | Refills: 1 | Status: SHIPPED | OUTPATIENT
Start: 2018-02-28 | End: 2018-05-22

## 2018-02-28 NOTE — TELEPHONE ENCOUNTER
Tramadol 50 mg       Last Written Prescription Date:  12/31/17  Last Fill Quantity: 60,   # refills: 0  Last Office Visit: 12/4/17  Future Office visit:       Routing refill request to provider for review/approval because:  Drug not on the [facility], [facility] or [facility] refill protocol or controlled substance    Amairani Hernandez RN   [facility], Primary Care

## 2018-02-28 NOTE — TELEPHONE ENCOUNTER
Routing refill request to provider for review/approval because:  Labs not current:  LDL & creatinine, A1c    insulin glargine (LANTUS SOLOSTAR) 100 UNIT/ML injection 9 mL 3 5/10/2017  --   Sig: Inject 30 Units Subcutaneous At Bedtime   Patient taking differently: Inject 48 Units Subcutaneous At Bedtime           Last OV with Dr. King:12/4/2017    Future OV with Dr. King: none    LDL Cholesterol Calculated   Date Value Ref Range Status   12/19/2016 21 <100 mg/dL Final     Comment:     Desirable:       <100 mg/dl     Creatinine   Date Value Ref Range Status   12/19/2016 0.88 0.52 - 1.04 mg/dL Final     BP Readings from Last 3 Encounters:   12/31/17 110/60   12/08/17 113/70   12/05/17 126/67     Lab Results   Component Value Date    A1C 10.9 09/28/2017    A1C 12.0 05/26/2017    A1C 10.9 12/19/2016    A1C 10.8 05/25/2016    A1C 10.6 03/21/2016     Helena Dickson RN

## 2018-02-28 NOTE — TELEPHONE ENCOUNTER
Patient advised Ultram script faxed to [facility] pharmacy [place].  Patient stated understanding.    Rachell Meyer CMA

## 2018-02-28 NOTE — TELEPHONE ENCOUNTER
Patient advised of information below per Dr. King.  Appts scheduled on 04/18/18 with Dr. King and fasting lab    Rachell Meyer CMA

## 2018-03-01 ENCOUNTER — TELEPHONE (OUTPATIENT)
Dept: ENDOCRINOLOGY | Facility: CLINIC | Age: 63
End: 2018-03-01

## 2018-03-01 DIAGNOSIS — Z79.4 TYPE 2 DIABETES MELLITUS WITH HYPERGLYCEMIA, WITH LONG-TERM CURRENT USE OF INSULIN (H): Primary | ICD-10-CM

## 2018-03-01 DIAGNOSIS — E11.65 TYPE 2 DIABETES MELLITUS WITH HYPERGLYCEMIA, WITH LONG-TERM CURRENT USE OF INSULIN (H): Primary | ICD-10-CM

## 2018-03-01 NOTE — TELEPHONE ENCOUNTER
Received notice from [employer] that patient's Lantus is no longer covered. Formulary alternatives are Basaglar, Levemir and Tresiba.    Will send to Dr. Medrano to review and determine alternative.    Anahy Bloom RN  Endocrine Care Coordinator  [facility]

## 2018-03-02 NOTE — TELEPHONE ENCOUNTER
Left message for patient to return call.    Per Marcela Message:  Nalini Maciel LPN  Adult Endocrinology  [facility]

## 2018-03-04 DIAGNOSIS — E78.00 HIGH CHOLESTEROL: ICD-10-CM

## 2018-03-05 RX ORDER — SIMVASTATIN 20 MG
TABLET ORAL
Qty: 30 TABLET | Refills: 1 | Status: SHIPPED | OUTPATIENT
Start: 2018-03-05 | End: 2018-06-13

## 2018-03-05 NOTE — TELEPHONE ENCOUNTER
simvastatin (ZOCOR) 20 MG tablet 30 tablet 0 1/19/2018  No   Sig: TAKE 1 TABLET (20 MG) BY MOUTH AT BEDTIME   Class: E-Prescribe   Notes to Pharmacy: Due for fasting labs. Kevin refill. Letter sent to patient.     Last OV with Dr. King: 12/4/2017    Future OV: 4/18/2018 with fasting labs    LDL Cholesterol Calculated   Date Value Ref Range Status   12/19/2016 21 <100 mg/dL Final     Comment:     Desirable:       <100 mg/dl   ]  Creatinine   Date Value Ref Range Status   12/19/2016 0.88 0.52 - 1.04 mg/dL Final     Statins Protocol Failed3/4 1:18 PM   LDL on file in past 12 months    No abnormal creatine kinase in past 12 months    Recent (12 mo) or future (30 days) visit within the authorizing provider's specialty    Patient is age 18 or older    No active pregnancy on record    No positive pregnancy test in past 12 months     2nd kevin refill to get patient to appointment on 4/18/2018, Helena Dickson RN

## 2018-03-07 DIAGNOSIS — E78.00 HIGH CHOLESTEROL: ICD-10-CM

## 2018-03-08 RX ORDER — SIMVASTATIN 20 MG
TABLET ORAL
Qty: 30 TABLET | Refills: 0 | OUTPATIENT
Start: 2018-03-08

## 2018-03-08 NOTE — TELEPHONE ENCOUNTER
simvastatin (ZOCOR) 20 MG tablet 30 tablet 1 3/5/2018  No   Sig: TAKE 1 TABLET (20 MG) BY MOUTH AT BEDTIME   Class: E-Prescribe   Notes to Pharmacy: 2nd kevin refill.     Our records indicate duplicate request. Same requesting pharmacy. Helena Dickson RN

## 2018-03-09 RX ORDER — INSULIN GLARGINE 100 [IU]/ML
47 INJECTION, SOLUTION SUBCUTANEOUS AT BEDTIME
Qty: 45 ML | Refills: 1 | Status: SHIPPED | OUTPATIENT
Start: 2018-03-09 | End: 2018-08-08

## 2018-03-09 NOTE — TELEPHONE ENCOUNTER
Patient informed of change from Lantus to Basaglar. She had no further questions at this time.    Prescription completed to pharmacy.    Argelia Maciel LPN  Adult Endocrinology  [facility]

## 2018-03-14 ENCOUNTER — DOCUMENTATION ONLY (OUTPATIENT)
Dept: CARE COORDINATION | Facility: CLINIC | Age: 63
End: 2018-03-14

## 2018-03-14 NOTE — PROGRESS NOTES
[facility] utilizes an encounter to take the place of a direct phone call to your office. Please take a moment to review the below request. Your reply to this encounter will act as a verbal OK of orders if requested below. Thank you.  Fall on 3/13/18    patient reports she had a fall going up the stairs, she reports she missed the top step and tobbled over onto her knees and hands.  No visible injury noted, no abrasions.  Client states she was able to get herself up without assistance.  She was not using the hand rail on the stair, education provided to use rail going up and down stairs for safety

## 2018-03-22 DIAGNOSIS — Z79.4 UNCONTROLLED TYPE 2 DIABETES MELLITUS WITH HYPERGLYCEMIA, WITH LONG-TERM CURRENT USE OF INSULIN (H): ICD-10-CM

## 2018-03-22 DIAGNOSIS — E11.65 UNCONTROLLED TYPE 2 DIABETES MELLITUS WITH HYPERGLYCEMIA, WITH LONG-TERM CURRENT USE OF INSULIN (H): ICD-10-CM

## 2018-03-22 NOTE — TELEPHONE ENCOUNTER
metFORMIN (GLUCOPHAGE) 1000 MG tablet 60 tablet 0 2/12/2018  No   Sig: TAKE 1 TABLET BY MOUTH 2 TIMES DAILY WITH MEALS   Class: E-Prescribe   Notes to Pharmacy: Due for diabetes follow up/labs. Letter sent to patient.     Last OV with Dr. King: 12/4/2018    Next 5 appointments (look out 90 days)     Apr 18, 2018 10:30 AM CDT   Return Visit with Tiffany King MD PhD   [facility]    [address] [place] [id number]   [phone]                  Biguanide Agents Failed3/22 2:04 AM   Patient has documented LDL within the past 12 mos.    Patient has had a Microalbumin in the past 12 mos.    Patient has documented A1c within the specified period of time.    Blood pressure less than 140/90 in past 6 months    Patient is age 10 or older    Patient's CR is NOT>1.4 OR Patient's EGFR is NOT<45 within past 12 mos.    Patient does NOT have a diagnosis of CHF.    Patient is not pregnant    Patient has not had a positive pregnancy test within the past 12 mos.     Recent (6 mo) or future (30 days) visit within the authorizing provider's specialty     2nd kevin refill authorized as patient scheduled follow up for diabetes. Helena Dickson RN

## 2018-04-16 ENCOUNTER — DOCUMENTATION ONLY (OUTPATIENT)
Dept: LAB | Facility: CLINIC | Age: 63
End: 2018-04-16

## 2018-04-16 DIAGNOSIS — Z79.4 UNCONTROLLED TYPE 2 DIABETES MELLITUS WITH HYPERGLYCEMIA, WITH LONG-TERM CURRENT USE OF INSULIN (H): ICD-10-CM

## 2018-04-16 DIAGNOSIS — E78.5 HYPERLIPIDEMIA LDL GOAL <100: Primary | ICD-10-CM

## 2018-04-16 DIAGNOSIS — E11.65 UNCONTROLLED TYPE 2 DIABETES MELLITUS WITH HYPERGLYCEMIA, WITH LONG-TERM CURRENT USE OF INSULIN (H): ICD-10-CM

## 2018-04-16 DIAGNOSIS — I10 HTN, GOAL BELOW 140/90: ICD-10-CM

## 2018-04-23 ENCOUNTER — DOCUMENTATION ONLY (OUTPATIENT)
Dept: CARE COORDINATION | Facility: CLINIC | Age: 63
End: 2018-04-23

## 2018-04-23 NOTE — PROGRESS NOTES
[facility] utilizes an encounter to take the place of a direct phone call to your office. Please take a moment to review the below request. Please reply or route message to author of this encounter.  Message will act as a verbal OK of orders requested below. Thank you.    ORDER requesting ongoing weekly SN visits    MD SUMMARY/PLAN OF CARE    MD Summary-Michelle  Pt is a 63 yr old female who continues with [facility] for Disease Management/Education and assistance with weekly med set up and assessment of compliance and refills. She currently resides in a single family home with her daughter/son in law and grandchildren. Family assists PRN, client cares for her grandchildren most days. She takes medical transport to all appointments or family will take if she is not able to arrange transport. She has been bringing after visit summary from appointments to nurse for medicaiton reconciliation and care coordination. Primary medical diagnosis is Severe major depression with psychotic features, pt also suffers from occasional memory loss and is Type 2 diabetic.  This last 60 days she has reported some auditory hallucinations, reported to psych MD and abilify was increased which has helped client, she denies furhter hallucinations.  Pt has ostomy which she manages independently, pt also has implant  which has resulted in her remaining continent of urine. She reports chronic back pain but this has been well controlled until this last week, she is able to state correlation of increased pain with missing a couple doses of her gabapentin.  Her last fall was 3/13/18 as she was going up the stairs.     A/O x 3 with forgetfulness, speech is clear, she is pleasant and cooperative with in home nurse visits. She has been taking medications as set up by nurse, with an occasional missed dose, this has improved significantly with homecare involvement. She is aware if she missed 2 or more doses she begins to have  auditory hallucinations, and occasional visual hallucinations which she is aware is happening.    Pt is following [facility] doctor at [facility] and  [facility]. Pt is also seeing a therapist and pt states this has helped with depression. Education on ADA diet and carb counting,  She follows up with endocrinology  as directed and her DM is being managed by DM education.  Client has verbalized awareness of elevated blood sugars due to poor food choices.     BACKGROUND//Primary medical diagnosis is Severe major depression with psychotic features, pt also suffers from occasional memory loss and is Type 2 diabetic.  ANALYSIS// High risk for hospitalization r/t poorly controlled diabetes and history of noncompliance/forgetfulness with taking high risk medications correctly.  She will require skilled interventions to manage her health at home. RECOMMENDATION// Recommend SN visits for Medication management/education, pt/CG education on ADA diet, skin integrity assessments/teaching.  Chronic pain management/education. She will require skilled interventions to manage her health at home. Expected length of home care is  ongoing HC need for med mgmt     Patient/caregiver educated on security, storage and disposal of controlled substances per handout.    Emergency Plan created and reviewed with patient and left in home    Patient Identified Goal// To take all my pills so I dont have hallucinations, to not fall going up the stairs        EVELYN Orellana  [phone]  [email or web address]

## 2018-04-26 DIAGNOSIS — F41.9 ANXIETY: ICD-10-CM

## 2018-04-26 DIAGNOSIS — Z53.9 DIAGNOSIS NOT YET DEFINED: Primary | ICD-10-CM

## 2018-04-26 PROCEDURE — G0179 MD RECERTIFICATION HHA PT: HCPCS | Performed by: INTERNAL MEDICINE

## 2018-04-27 RX ORDER — BUSPIRONE HYDROCHLORIDE 30 MG/1
TABLET ORAL
Qty: 60 TABLET | Refills: 0 | Status: SHIPPED | OUTPATIENT
Start: 2018-04-27 | End: 2018-05-24

## 2018-05-22 DIAGNOSIS — M25.562 ARTHRALGIA OF BOTH KNEES: ICD-10-CM

## 2018-05-22 DIAGNOSIS — M25.561 ARTHRALGIA OF BOTH KNEES: ICD-10-CM

## 2018-05-22 DIAGNOSIS — M17.10 ARTHRITIS OF KNEE: ICD-10-CM

## 2018-05-23 NOTE — TELEPHONE ENCOUNTER
Routing refill request to provider for review/approval because:  Drug not on the [facility] refill protocol     traMADol (ULTRAM) 50 MG tablet 60 tablet 1 2/28/2018     Sig: TAKE 1 TO 2 TABLETS BY MOUTH EVERY DAY AS NEEDED FOR PAIN    Class: Local Print    Notes to Pharmacy: Not to exceed 4 additional fills before 07/01/2018      Last Ov with Dr. King: 12/4/2017    Next 5 appointments (look out 90 days)     Jun 07, 2018  6:10 PM CDT   [facility] Visit with Tiffany King MD PhD   [facility]    [address] [place] [id number]   [phone]                Helena Dickson RN

## 2018-05-24 DIAGNOSIS — F41.9 ANXIETY: ICD-10-CM

## 2018-05-24 DIAGNOSIS — E11.65 UNCONTROLLED TYPE 2 DIABETES MELLITUS WITH HYPERGLYCEMIA, WITH LONG-TERM CURRENT USE OF INSULIN (H): ICD-10-CM

## 2018-05-24 DIAGNOSIS — Z79.4 UNCONTROLLED TYPE 2 DIABETES MELLITUS WITH HYPERGLYCEMIA, WITH LONG-TERM CURRENT USE OF INSULIN (H): ICD-10-CM

## 2018-05-24 RX ORDER — TRAMADOL HYDROCHLORIDE 50 MG/1
TABLET ORAL
Qty: 60 TABLET | Refills: 0 | Status: SHIPPED | OUTPATIENT
Start: 2018-05-24 | End: 2018-07-16

## 2018-05-24 RX ORDER — BUSPIRONE HYDROCHLORIDE 30 MG/1
TABLET ORAL
Qty: 60 TABLET | Refills: 0 | Status: SHIPPED | OUTPATIENT
Start: 2018-05-24 | End: 2018-07-10

## 2018-05-24 NOTE — TELEPHONE ENCOUNTER
Routing refill request to provider for review/approval because:  Patient has received 2 kevin refills for the below medications. Please advise if a 3rd kevin refill can be given.     BusPIRone HCl 30 MG TABS 60 tablet 0 4/27/2018     Sig: TAKE 1 TABLET BY MOUTH TWICE A DAY      Last OV with Dr. King: 12/4/2017    Next 5 appointments (look out 90 days)     Jun 07, 2018  6:10 PM CDT   Return Visit with Tiffany King MD PhD   [facility]    [address] [place] [id number] [phone]                Atypical Antidepressants Protocol Passed5/24 1:26 AM   Recent (12 mo) or future (30 days) visit within the authorizing provider's specialty    Patient is age 18 or older    No active pregnancy on record    No positive pregnancy test in past 12 mos     metFORMIN (GLUCOPHAGE) 1000 MG tablet 60 tablet 0 4/27/2018     Sig: TAKE 1 TABLET BY MOUTH 2 TIMES DAILY WITH MEALS        Biguanide Agents Failed5/24 1:26 AM   Patient has documented LDL within the past 12 mos.    Patient has had a Microalbumin in the past 12 mos.    Patient has documented A1c within the specified period of time.    Blood pressure less than 140/90 in past 6 months    Patient is age 10 or older    Patient's CR is NOT>1.4 OR Patient's EGFR is NOT<45 within past 12 mos.    Patient does NOT have a diagnosis of CHF.    Patient is not pregnant    Patient has not had a positive pregnancy test within the past 12 mos.     Recent (6 mo) or future (30 days) visit within the authorizing provider's specialty     Helena Dickson RN

## 2018-06-06 ENCOUNTER — DOCUMENTATION ONLY (OUTPATIENT)
Dept: CARE COORDINATION | Facility: CLINIC | Age: 63
End: 2018-06-06

## 2018-06-06 NOTE — PROGRESS NOTES
[facility] utilizes an encounter to take the place of a direct phone call to your office. Please take a moment to review the below request. Please reply or route message to author of this encounter.  Message will act as a verbal OK of orders requested below. Thank you.     fall event 6/1/18 per patient report    MD SUMMARY/PLAN OF CARE    Client reports fall on 6/1/18 today.  She states she was going up the stairs and reports she slipped and went forward on the stairs.  She has an abrasion on her left knee she is managing independently.  She did not seek medical attention and denies hitting her head.  She was able to get herself up independently.  Client reports she was wearing proper footwear.  MD lali Orellana RN  [phone]  [email or web address]

## 2018-06-13 DIAGNOSIS — E78.00 HIGH CHOLESTEROL: ICD-10-CM

## 2018-06-13 NOTE — LETTER
June 18, 2018      Holly Muir  [id number] [facility] [address]              Dear Holly Muir,      The refill request made by your pharmacy was received at the clinic.     Signed Prescriptions:                        Disp   Refills    simvastatin (ZOCOR) 20 MG tablet           30 tab*0        Sig: Take 1 tablet (20 mg) by mouth At Bedtime (due for           office visit for future refills)  Authorizing Provider: CHARLIE CHARLES    At this time you are overdue in the clinic for a follow up appointment and fasting labs. A final kevin refill has been sent to your pharmacy. No further refills will be sent until you are seen in the clinic.    Please call your clinic to make an appointment with your provider before you run out of medication. This will prevent a delay in your next month's refill.    [facility]- [phone]    For your convenience we also offer online appointment scheduling at [email or web address] or [email or web address].     We invite you to refill your next prescription at a [facility] or take advantage of our prescription mail service.    Sincerely,    Your [facility] Team

## 2018-06-18 RX ORDER — SIMVASTATIN 20 MG
20 TABLET ORAL AT BEDTIME
Qty: 30 TABLET | Refills: 0 | Status: SHIPPED | OUTPATIENT
Start: 2018-06-18 | End: 2018-07-15

## 2018-06-18 NOTE — TELEPHONE ENCOUNTER
Patient Contact    Attempt # 1    Was call answered?  No.  Home number voicemail full-unable to leave a message.    Mailed final kevin refill letter.  Bere Patino, CMA

## 2018-06-18 NOTE — TELEPHONE ENCOUNTER
Please call patient. She is overdue for office visit and fasting labs. Please schedule an appointment in clinic and labs. Had no show last week,     I gave her 30 days. This is her last refill.

## 2018-06-20 ENCOUNTER — DOCUMENTATION ONLY (OUTPATIENT)
Dept: CARE COORDINATION | Facility: CLINIC | Age: 63
End: 2018-06-20

## 2018-06-20 NOTE — PROGRESS NOTES
[facility] utilizes an encounter to take the place of a direct phone call to your office. Please take a moment to review the below request. Please reply or route message to author of this encounter.  Message will act as a verbal OK of orders requested below. Thank you.    Fall reported 6/18/18 approx 10pm    on 6/18/18 client was attempting to get out of bed and her feet got tangled in her blanket and she fell hitting her head on the headboard resulting in a laceration over the right eye.    Plan for PT eval with recertification visit today    EVELYN Orellana  [phone]  [email or web address]

## 2018-06-27 DIAGNOSIS — Z53.9 DIAGNOSIS NOT YET DEFINED: Primary | ICD-10-CM

## 2018-06-27 PROCEDURE — G0179 MD RECERTIFICATION HHA PT: HCPCS | Performed by: INTERNAL MEDICINE

## 2018-07-16 DIAGNOSIS — M25.562 ARTHRALGIA OF BOTH KNEES: ICD-10-CM

## 2018-07-16 DIAGNOSIS — M17.10 ARTHRITIS OF KNEE: ICD-10-CM

## 2018-07-16 DIAGNOSIS — M25.561 ARTHRALGIA OF BOTH KNEES: ICD-10-CM

## 2018-07-16 NOTE — TELEPHONE ENCOUNTER
Routing refill request to provider for review/approval because:  Drug not on the [facility] refill protocol     traMADol (ULTRAM) 50 MG tablet 60 tablet 0 5/24/2018  No      Sig: TAKE 1 TO 2 TABLETS BY MOUTH EVERY DAY AS NEEDED FOR PAIN     Class: Local Print     Notes to Pharmacy: Not to exceed 4 additional fills before 08/27/2018       .Last office visit:  12/4/17 Dr. King                               6/7/18 NO show      Next 5 appointments (look out 90 days)     Aug 08, 2018  1:50 PM CDT   Return Visit with Tiffany King MD PhD   [facility]    [address] [place] [id number]   [phone]                Zoraida Min RN,   [facility]

## 2018-07-19 RX ORDER — TRAMADOL HYDROCHLORIDE 50 MG/1
TABLET ORAL
Qty: 60 TABLET | Refills: 0 | Status: SHIPPED | OUTPATIENT
Start: 2018-07-19 | End: 2018-08-08

## 2018-07-25 NOTE — TELEPHONE ENCOUNTER
Per Dr. King, patient needs to be seen for further refills. Multiple attempts to contact the patient have already been made for other medication refills with no response from the patient. The patient has received multiple kevin refills and has also no showed. Medication refill declined. Helena Dickson RN

## 2018-07-25 NOTE — TELEPHONE ENCOUNTER
Appears refills are being completed by primary.     Will forward to FP/IM pool at .    Argelia Maciel LPN  Adult Endocrinology  [facility]

## 2018-08-08 ENCOUNTER — OFFICE VISIT (OUTPATIENT)
Dept: PEDIATRICS | Facility: CLINIC | Age: 63
End: 2018-08-08
Payer: MEDICARE

## 2018-08-08 DIAGNOSIS — M25.562 CHRONIC PAIN OF LEFT KNEE: ICD-10-CM

## 2018-08-08 DIAGNOSIS — Z11.4 SCREENING FOR HIV (HUMAN IMMUNODEFICIENCY VIRUS): ICD-10-CM

## 2018-08-08 DIAGNOSIS — Z79.4 TYPE 2 DIABETES MELLITUS WITH HYPERGLYCEMIA, WITH LONG-TERM CURRENT USE OF INSULIN (H): ICD-10-CM

## 2018-08-08 DIAGNOSIS — M25.562 ARTHRALGIA OF BOTH KNEES: ICD-10-CM

## 2018-08-08 DIAGNOSIS — E11.65 TYPE 2 DIABETES MELLITUS WITH HYPERGLYCEMIA, WITH LONG-TERM CURRENT USE OF INSULIN (H): ICD-10-CM

## 2018-08-08 DIAGNOSIS — G89.4 CHRONIC PAIN SYNDROME: ICD-10-CM

## 2018-08-08 DIAGNOSIS — B07.9 VIRAL WART ON FINGER: ICD-10-CM

## 2018-08-08 DIAGNOSIS — Z12.11 SPECIAL SCREENING FOR MALIGNANT NEOPLASMS, COLON: ICD-10-CM

## 2018-08-08 DIAGNOSIS — Z51.81 ENCOUNTER FOR THERAPEUTIC DRUG MONITORING: ICD-10-CM

## 2018-08-08 DIAGNOSIS — I10 HTN, GOAL BELOW 140/90: ICD-10-CM

## 2018-08-08 DIAGNOSIS — E11.42 DIABETIC POLYNEUROPATHY ASSOCIATED WITH TYPE 2 DIABETES MELLITUS (H): ICD-10-CM

## 2018-08-08 DIAGNOSIS — M25.561 ARTHRALGIA OF BOTH KNEES: ICD-10-CM

## 2018-08-08 DIAGNOSIS — G89.29 CHRONIC PAIN OF LEFT KNEE: ICD-10-CM

## 2018-08-08 DIAGNOSIS — M17.10 ARTHRITIS OF KNEE: ICD-10-CM

## 2018-08-08 DIAGNOSIS — Z79.4 UNCONTROLLED TYPE 2 DIABETES MELLITUS WITH HYPERGLYCEMIA, WITH LONG-TERM CURRENT USE OF INSULIN (H): Primary | ICD-10-CM

## 2018-08-08 DIAGNOSIS — E11.65 UNCONTROLLED TYPE 2 DIABETES MELLITUS WITH HYPERGLYCEMIA, WITH LONG-TERM CURRENT USE OF INSULIN (H): Primary | ICD-10-CM

## 2018-08-08 LAB
ANION GAP SERPL CALCULATED.3IONS-SCNC: 6 MMOL/L (ref 3–14)
BUN SERPL-MCNC: 13 MG/DL (ref 7–30)
CALCIUM SERPL-MCNC: 8.3 MG/DL (ref 8.5–10.1)
CHLORIDE SERPL-SCNC: 98 MMOL/L (ref 94–109)
CO2 SERPL-SCNC: 27 MMOL/L (ref 20–32)
CREAT SERPL-MCNC: 0.74 MG/DL (ref 0.52–1.04)
CREAT UR-MCNC: 30 MG/DL
GFR SERPL CREATININE-BSD FRML MDRD: 80 ML/MIN/1.7M2
GLUCOSE SERPL-MCNC: 273 MG/DL (ref 70–99)
HBA1C MFR BLD: 10.3 % (ref 0–5.6)
MICROALBUMIN UR-MCNC: <5 MG/L
MICROALBUMIN/CREAT UR: NORMAL MG/G CR (ref 0–25)
POTASSIUM SERPL-SCNC: 3.9 MMOL/L (ref 3.4–5.3)
SODIUM SERPL-SCNC: 131 MMOL/L (ref 133–144)
T4 FREE SERPL-MCNC: 1.08 NG/DL (ref 0.76–1.46)
TSH SERPL DL<=0.005 MIU/L-ACNC: 5.29 MU/L (ref 0.4–4)

## 2018-08-08 PROCEDURE — 99215 OFFICE O/P EST HI 40 MIN: CPT | Performed by: INTERNAL MEDICINE

## 2018-08-08 PROCEDURE — 87389 HIV-1 AG W/HIV-1&-2 AB AG IA: CPT | Performed by: INTERNAL MEDICINE

## 2018-08-08 PROCEDURE — 83036 HEMOGLOBIN GLYCOSYLATED A1C: CPT | Performed by: INTERNAL MEDICINE

## 2018-08-08 PROCEDURE — 36415 COLL VENOUS BLD VENIPUNCTURE: CPT | Performed by: INTERNAL MEDICINE

## 2018-08-08 PROCEDURE — 84443 ASSAY THYROID STIM HORMONE: CPT | Performed by: INTERNAL MEDICINE

## 2018-08-08 PROCEDURE — 84439 ASSAY OF FREE THYROXINE: CPT | Performed by: INTERNAL MEDICINE

## 2018-08-08 PROCEDURE — 80048 BASIC METABOLIC PNL TOTAL CA: CPT | Performed by: INTERNAL MEDICINE

## 2018-08-08 PROCEDURE — 80307 DRUG TEST PRSMV CHEM ANLYZR: CPT | Mod: 90 | Performed by: INTERNAL MEDICINE

## 2018-08-08 PROCEDURE — 99000 SPECIMEN HANDLING OFFICE-LAB: CPT | Performed by: INTERNAL MEDICINE

## 2018-08-08 PROCEDURE — 82043 UR ALBUMIN QUANTITATIVE: CPT | Performed by: INTERNAL MEDICINE

## 2018-08-08 RX ORDER — GABAPENTIN 300 MG/1
CAPSULE ORAL
Qty: 360 CAPSULE | Refills: 1 | Status: SHIPPED | OUTPATIENT
Start: 2018-08-08 | End: 2019-06-27

## 2018-08-08 RX ORDER — BLOOD-GLUCOSE METER
EACH MISCELLANEOUS
Qty: 1 KIT | Refills: 0 | Status: SHIPPED | OUTPATIENT
Start: 2018-08-08 | End: 2021-04-12

## 2018-08-08 RX ORDER — TRAMADOL HYDROCHLORIDE 50 MG/1
50 TABLET ORAL 2 TIMES DAILY PRN
Qty: 60 TABLET | Refills: 2 | Status: SHIPPED | OUTPATIENT
Start: 2018-08-08 | End: 2018-11-16

## 2018-08-08 RX ORDER — INSULIN GLARGINE 100 [IU]/ML
55 INJECTION, SOLUTION SUBCUTANEOUS AT BEDTIME
Qty: 16.5 ML | Refills: 3 | Status: SHIPPED | OUTPATIENT
Start: 2018-08-08 | End: 2019-09-20

## 2018-08-08 NOTE — PATIENT INSTRUCTIONS
Make appointment(s) for:   -- get labs today  -- mammogram  -- sports medicine  -- endocrine follow up (Dr. Medrano or covering provider)  -- diabetes educator follow up in 2-4 weeks.   -- clinic follow up in 3 months.      Have home care RN draw fasting lipid at the next visit.         Medication(s) prescribed today:    Orders Placed This Encounter   Medications     BASAGLAR 100 UNIT/ML injection     Sig: Inject 55 Units Subcutaneous At Bedtime     Dispense:  16.5 mL     Refill:  3     metFORMIN (GLUCOPHAGE) 1000 MG tablet     Sig: TAKE 1 TABLET BY MOUTH 2 TIMES DAILY WITH MEALS     Dispense:  180 tablet     Refill:  0     blood glucose monitoring (ACCU-CHEK CELIA PLUS) meter device kit     Sig: Use to test blood sugars 4 times daily or as directed.     Dispense:  1 kit     Refill:  0

## 2018-08-08 NOTE — PROGRESS NOTES
SUBJECTIVE:   Holly Muir is a 63 year old female who presents to clinic today for the following health issues:      Medication Followup of all meds    Taking Medication as prescribed: yes    Side Effects:  None    Medication Helping Symptoms:  yes     Holly has Severe major depression with psychotic features (H); History of cervical cancer; S/P colostomy (H); Memory loss; Type 2 diabetes mellitus, uncontrolled A1C goal <8%; HTN, goal below 140/90; and Encounter for long-term (current) use of insulin (H) on her pertinent problem list.     Patient was put on Cogentin by her psychiatrist,  Causing problem, out of it, unable to get up from sitting position. Had several falls after getting on this. Her psychiatrist took her off this now.  She had left metatarsal fracture from 1 of the falls in February.  She went to physical therapy.  She also had knee injury.  No fracture in the knee.  has persistent pain. She scraped it again recently.     Has not seen endocrinologist since last September. Last A1c 10.9 in September 2018.     Average glucose 196, no hypoglycemia. Lowest glucose 92, highest 317    Basaglar 47 units daily; Novolog 6-8 unit(s) before breakfast, 9-12 unit(s) before lunch and dinner.  She has been trying to cut down on carbs intake and mealtime.      She is due for colon cancer screening with FIT testing this year.  She has chronic back pain and knee pain has been maintained on Tramadol 2 tablets per day.  She reports this dose is stable.  She will like to get a refill.      ROS:  Constitutional, HEENT, cardiovascular, pulmonary, gi and gu systems are negative, except as otherwise noted.         Current Outpatient Prescriptions on File Prior to Visit:  ARIPiprazole (ABILIFY) 20 MG tablet Take 1 tablet (20 mg) by mouth daily Prescribed by Dr. Isael Jim[facility]nez at [facility]   B Complex Vitamins (B-COMPLEX/B-12 PO) Take 1 tablet by mouth daily.    buPROPion (WELLBUTRIN XL) 150 MG 24 hr tablet  TAKE 3 TABLETS (450 MG) BY MOUTH EVERY MORNING   BusPIRone HCl 30 MG TABS TAKE 1 TABLET BY MOUTH TWICE A DAY   Cholecalciferol (VITAMIN D) 1000 UNITS capsule Take 2,000 to 4,000 Units per day   [facility] ASPIRIN ADULT LOW DOSE 81 MG chewable tablet TAKE 1 TABLET (81 MG) BY MOUTH DAILY OTC- NOT COVERED BY INS   enalapril (VASOTEC) 2.5 MG tablet TAKE 1 TABLET BY MOUTH EVERY DAY   ibuprofen (ADVIL/MOTRIN) 800 MG tablet Take 1 tablet (800 mg) by mouth every 8 hours as needed for moderate pain (with food)   insulin aspart (NOVOLOG FLEXPEN) 100 UNIT/ML injection Take as directed with meals. Total daily dose approx 30 units.   multivitamin, therapeutic with minerals (MULTI-VITAMIN) TABS tablet Take 1 tablet by mouth daily   omeprazole (PRILOSEC) 20 MG CR capsule TAKE 2 CAPSULES BY MOUTH DAILY TAKE 30-60 MINUTES BEFORE A MEAL.   PARoxetine (PAXIL) 20 MG tablet TAKE 3 TABLETS AT BEDTIME (Prescribed by Dr. Isael Jiménez at [facility])   pioglitazone (ACTOS) 30 MG tablet Take 1 tablet (30 mg) by mouth daily   propranolol (INDERAL LA) 60 MG 24 hr capsule Take 1 capsule (60 mg) by mouth See Admin Instructions Take one po q day.  May repeat dose if tremor persists.   simvastatin (ZOCOR) 20 MG tablet TAKE 1 TABLET (20 MG) BY MOUTH AT BEDTIME (DUE FOR OFFICE VISIT FOR FUTURE REFILLS)   blood glucose monitoring (ACCU-CHEK CELIA) test strip Use to test blood sugars 8 times daily or as directed.   blood glucose monitoring (ACCU-CHEK FASTCLIX) lancets Use to test blood sugar 8 times daily or as directed.   insulin pen needle 31G X 5 MM Use four times 4 day times a day   nystatin (MYCOSTATIN) 605078 UNIT/GM POWD Apply 1 dose topically 3 times daily as needed   order for DME Equipment being ordered: Knee walker    order for DME by Device route continuous Air walker -  Use as instructed   ORDER FOR DME Equipment being ordered:  Ostomy supplies.  Drain Pouch by [place] #.    Also needs Barrier by [place] #.   ORDER  FOR DME Equipment being ordered: Cane   propranolol (INDERAL LA) 60 MG capsule Take 1 capsule (60 mg) by mouth daily May take a second dose if tremor recurs.   UNIVERSAL REMOVER WIPES EX MISC uses to remove barrier from colostomy bag at time of change   [DISCONTINUED] BASAGLAR 100 UNIT/ML injection Inject 47 Units Subcutaneous At Bedtime   [DISCONTINUED] gabapentin (NEURONTIN) 300 MG capsule TAKE ONE CAPSULE BY MOUTH TWICE A DAY TAKE 2 CAPSULES BY MOUTH IN THE EVENING   [DISCONTINUED] insulin glargine (LANTUS SOLOSTAR) 100 UNIT/ML injection Inject 47 Units Subcutaneous At Bedtime   [DISCONTINUED] metFORMIN (GLUCOPHAGE) 1000 MG tablet TAKE 1 TABLET BY MOUTH 2 TIMES DAILY WITH MEALS     No current facility-administered medications on file prior to visit.        Patient Active Problem List   Diagnosis     Hyperlipidemia LDL goal <100     Severe major depression with psychotic features (H)     History of cervical cancer     S/P colostomy (H)     Mixed incontinence urge and stress     Memory loss     Type 2 diabetes mellitus, uncontrolled A1C goal <8%     Obesity     Pain in joint, hand     Trigger finger, acquired     Synovitis and tenosynovitis     Dermatochalasis     Presbyopia     OCD (obsessive compulsive disorder)     HTN, goal below 140/90     Cataracts, both eyes     Health Care Home     Lumbago     Sprain of lumbar region     Generalized osteoarthrosis, unspecified site     Rotator cuff impingement syndrome     Hypoglycemia due to insulin     Encounter for long-term (current) use of insulin (H)     Colostomy, evaluate (H)     Urinary retention     ACP (advance care planning)     Bilateral low back pain without sciatica     Anxiety     Fecal incontinence due to anorectal disorder     Visual hallucination     Type 2 diabetes mellitus with hyperglycemia (H)     Chronic pain syndrome     Past Surgical History:   Procedure Laterality Date     ARTHROSCOPY KNEE RT/LT      LT     BREAST LUMPECTOMY, RT/LT      LT-benign       C EXCIS PTERYGIUM  10/08    [facility]     C STOMACH SURGERY PROCEDURE UNLISTED       COLONOSCOPY  5/10/2012    Procedure:COLONOSCOPY; screening colonoscopy; Surgeon:MILLA VEGA; Location:[facility] OR     COLOSTOMY  2000     COLOSTOMY       [facility] COLONOSCOPY THRU STOMA, DIAGNOSTIC  2002    normal per report-no records available-records destroyed     HYSTERECTOMY, PAP NO LONGER INDICATED      done in [place]-cervical cancer     IMPLANT STIMULATOR SACRAL NERVE STAGE ONE Right 3/10/2015    Procedure: IMPLANT STIMULATOR SACRAL NERVE STAGE ONE;  Surgeon: Teodora Yusuf MD;  Location: [facility] OR     IMPLANT STIMULATOR SACRAL NERVE STAGE TWO Right 3/31/2015    Procedure: IMPLANT STIMULATOR SACRAL NERVE STAGE TWO;  Surgeon: Teodora Yusuf MD;  Location: [facility] OR     KNEE SURGERY       SALPINGO OOPHORECTOMY,R/L/JENNIFER      Salpingo Oophorectomy, RT/LT/JENNIFER       Social History   Substance Use Topics     Smoking status: Former Smoker     Quit date: 8/9/2016     Smokeless tobacco: Never Used     Alcohol use 0.0 oz/week     0 Standard drinks or equivalent per week      Comment: social occasions     Family History   Problem Relation Age of Onset     Diabetes Mother      Cerebrovascular Disease Mother      Diabetes Father      Hypertension Father      Cancer Maternal Grandfather      Cancer Paternal Grandfather      Diabetes Brother      Diabetes Sister      Thyroid Disease Daughter      Thyroid Disease Sister      Multiple Sclerosis Daughter      Glaucoma No family hx of      Macular Degeneration No family hx of              Problem list, Medication list, Allergies, and Medical/Social/Surgical histories reviewed in [facility] and updated as appropriate.    OBJECTIVE:                                                    BP 90/62  Pulse 71  Temp 97  F (36.1  C) (Temporal)  Wt 192 lb (87.1 kg)  SpO2 100%  BMI 34.02 kg/m2    GENERAL: healthy, alert and no distress  HEENT: unremarkable  Neck: no adenopathy/mass/stiffness. Thyroid  normal.  Lung: clear, no wheezing/rhonchi/crackles  Heart: RRR, normal S1/2, no murmur/gallop/rup  Abd: soft, normal BS, non tender, no organomegaly   Ext: Left knee: There is slight puffiness over the patella.  There is focal tenderness to palpation over at the inferior aspect of patella where she has a new scrape.  no evidence of infection of the skin.  Mild joint line tenderness.      Diagnostic test results:  Results for orders placed or performed in visit on 02/15/18   XR Foot Left G/E 3 Views    Narrative    XR FOOT LT G/E 3 VW 2/15/2018 11:49 AM    COMPARISON: 1/25/2018    HISTORY: Fifth metatarsal fracture.      Impression    IMPRESSION: Moderately displaced left fifth metatarsal diaphyseal  fracture is again seen and in unchanged alignment. No new fractures  are seen in the left foot. Achilles enthesopathy again seen.    GAIL SUAREZ MD     *Note: Due to a large number of results and/or encounters for the requested time period, some results have not been displayed. A complete set of results can be found in Results Review.         ASSESSMENT/PLAN:                                                      63 year old female with the following diagnoses and treatment plan:      ICD-10-CM    1. Uncontrolled type 2 diabetes mellitus with hyperglycemia, with long-term current use of insulin (H) E11.65 Hemoglobin A1c    Z79.4 TSH with free T4 reflex     metFORMIN (GLUCOPHAGE) 1000 MG tablet     blood glucose monitoring (ACCU-CHEK CELIA PLUS) meter device kit   2. Viral wart on finger B07.9 DERMATOLOGY REFERRAL   3. Special screening for malignant neoplasms, colon Z12.11 Fecal colorectal cancer screen (FIT)   4. HTN, goal below 140/90 I10 Albumin Random Urine Quantitative with Creat Ratio     Basic metabolic panel   5. Type 2 diabetes mellitus with hyperglycemia, with long-term current use of insulin (H) E11.65 BASAGLAR 100 UNIT/ML injection    Z79.4    6. Screening for HIV (human immunodeficiency virus) Z11.4 HIV  Antigen Antibody Combo   7. Diabetic polyneuropathy associated with type 2 diabetes mellitus (H) E11.42 gabapentin (NEURONTIN) 300 MG capsule   8. Arthritis of knee M17.10 traMADol (ULTRAM) 50 MG tablet   9. Arthralgia of both knees M25.561 traMADol (ULTRAM) 50 MG tablet    M25.562    10. Encounter for therapeutic drug monitoring Z51.81 Drug  Screen Comprehensive , Urine with Reported Meds (MedTox) (Pain Care Package)   11. Chronic pain syndrome G89.4    12. Chronic pain of left knee M25.562 SPORTS MEDICINE REFERRAL    G89.29        --Diabetes: Likely uncontrolled.  Has not followed up with endocrine for almost a year.  I increase her Basaglar to 55 units per day.  Continue with NovoLog pre-meal dose.  We will connect her with diabetes educator in the next 2-4 weeks to follow-up on her glucose, and further titrate Basaglar or NovoLog.  We will get her reconnected with endocrinology.  --Get routine monitoring labs done today.  She is also due for fasting lipids.  She is not fasting today.  We will have home care RN to draw this at the next home care visit.  --Chronic pain in the back in knee: Continue with tramadol.  -- Acute on chronic left knee pain: We will have her see sports medicine.  Perhaps cortisone injection may be of benefit.--   --FIT testing for colon cancer screening and mammogram.    Will call or return to clinic if worsening or symptoms not improving as discussed.  See Patient Instructions.      Tiffany King MD-PhD  [facility] [place]    (Note: Chart documentation was done in part with Dragon Voice Recognition software. Although reviewed after completion, some word and grammatical errors may remain.)

## 2018-08-08 NOTE — MR AVS SNAPSHOT
After Visit Summary   8/8/2018    Holly Muir    MRN: [id number]           Patient Information     Date Of Birth          1955        Visit Information        Provider Department      8/8/2018 1:50 PM Tiffany King MD PhD [facility]        Today's Diagnoses     Uncontrolled type 2 diabetes mellitus with hyperglycemia, with long-term current use of insulin (H)    -  1    Viral wart on finger        Special screening for malignant neoplasms, colon        HTN, goal below 140/90        Type 2 diabetes mellitus with hyperglycemia, with long-term current use of insulin (H)        Screening for HIV (human immunodeficiency virus)        Diabetic polyneuropathy associated with type 2 diabetes mellitus (H)        Arthritis of knee        Arthralgia of both knees        Encounter for therapeutic drug monitoring        Chronic pain syndrome        Chronic pain of left knee          Care Instructions    Make appointment(s) for:   -- get labs today  -- mammogram  -- sports medicine  -- endocrine follow up (Dr. Medrano or covering provider)  -- diabetes educator follow up in 2-4 weeks.   -- clinic follow up in 3 months.      Have home care RN draw fasting lipid at the next visit.         Medication(s) prescribed today:    Orders Placed This Encounter   Medications     BASAGLAR 100 UNIT/ML injection     Sig: Inject 55 Units Subcutaneous At Bedtime     Dispense:  16.5 mL     Refill:  3     metFORMIN (GLUCOPHAGE) 1000 MG tablet     Sig: TAKE 1 TABLET BY MOUTH 2 TIMES DAILY WITH MEALS     Dispense:  180 tablet     Refill:  0     blood glucose monitoring (ACCU-CHEK CELIA PLUS) meter device kit     Sig: Use to test blood sugars 4 times daily or as directed.     Dispense:  1 kit     Refill:  0                     Follow-ups after your visit        Additional Services     DERMATOLOGY REFERRAL       Your provider [place] you [place] [facility] [phone]    [email or web address]    Please be aware that coverage of these services is subject to the terms and limitations of your health insurance plan.  Call member services at your health plan with any benefit or coverage questions.      Please bring the following with you to your appointment:    (1) Any X-Rays, CTs or MRIs which have been performed.  Contact the facility where they were done to arrange for  prior to your scheduled appointment.    (2) List of current medications  (3) This referral request   (4) Any documents/labs given to you for this referral            SPORTS MEDICINE REFERRAL       Your provider has referred you to:  FM: [facility] [phone]   [email or web address]    Please be aware that coverage of these services is subject to the terms and limitations of your health insurance plan.  Call member services at your health plan with any benefit or coverage questions.      Please bring the following to your appointment:    >>   Any x-rays, CTs or MRIs which have been performed.  Contact the facility where they were done to arrange for  prior to your scheduled appointment.    >>   List of current medications   >>   This referral request   >>   Any documents/labs given to you for this referral                  Future tests that were ordered for you today     Open Future Orders        Priority Expected Expires Ordered    Fecal colorectal cancer screen (FIT) Routine 8/29/2018 10/31/2018 8/8/2018            Who to contact     If you have questions or need follow up information about today's clinic visit or your schedule please contact [facility] directly at [phone].  Normal or non-critical lab and imaging results will be communicated to you by MyChart, letter or phone within 4 business days after the clinic has received the results. If you do not hear from us within 7  days, please contact the clinic through [employer] or phone. If you have a critical or abnormal lab result, we will notify you by phone as soon as possible.  Submit refill requests through [employer] or call your pharmacy and they will forward the refill request to us. Please allow 3 business days for your refill to be completed.          Additional Information About Your Visit        Care EveryWhere ID     This is your Care EveryWhere ID. This could be used by other organizations to access your [facility] medical records  [id number]        Your Vitals Were     Pulse Temperature Pulse Oximetry BMI (Body Mass Index)          71 97  F (36.1  C) (Temporal) 100% 34.02 kg/m2         Blood Pressure from Last 3 Encounters:   08/08/18 90/62   12/31/17 110/60   12/08/17 113/70    Weight from Last 3 Encounters:   08/08/18 192 lb (87.1 kg)   02/15/18 200 lb (90.7 kg)   01/25/18 196 lb (88.9 kg)              We Performed the Following     Albumin Random Urine Quantitative with Creat Ratio     Basic metabolic panel     DERMATOLOGY REFERRAL     Drug  Screen Comprehensive , Urine with Reported Meds (MedTox) (Pain Care Package)     Hemoglobin A1c     HIV Antigen Antibody Combo     SPORTS MEDICINE REFERRAL     TSH with free T4 reflex          Today's Medication Changes          These changes are accurate as of 8/8/18  2:36 PM.  If you have any questions, ask your nurse or doctor.               Start taking these medicines.        Dose/Directions    blood glucose monitoring meter device kit   Used for:  Uncontrolled type 2 diabetes mellitus with hyperglycemia, with long-term current use of insulin (H)   Started by:  Tiffany King MD PhD        Use to test blood sugars 4 times daily or as directed.   Quantity:  1 kit   Refills:  0         These medicines have changed or have updated prescriptions.        Dose/Directions    BASAGLAR 100 UNIT/ML injection   This may have changed:    - how much to take  - Another medication with the same name was  removed. Continue taking this medication, and follow the directions you see here.   Used for:  Type 2 diabetes mellitus with hyperglycemia, with long-term current use of insulin (H)   Changed by:  Tiffany King MD PhD        Dose:  55 Units   Inject 55 Units Subcutaneous At Bedtime   Quantity:  16.5 mL   Refills:  3       traMADol 50 MG tablet   Commonly known as:  ULTRAM   This may have changed:  See the new instructions.   Used for:  Arthritis of knee, Arthralgia of both knees   Changed by:  Tiffany King MD PhD        Dose:  50 mg   Take 1 tablet (50 mg) by mouth 2 times daily as needed for severe pain   Quantity:  60 tablet   Refills:  2            Where to get your medicines      These medications were sent to [facility]/pharmacy #[id number] - [place] - [address] [facility]  [address] [place] [address]     Phone:  [phone]     BASAGLAR 100 UNIT/ML injection    blood glucose monitoring meter device kit    gabapentin 300 MG capsule    metFORMIN 1000 MG tablet         Some of these will need a paper prescription and others can be bought over the counter.  Ask your nurse if you have questions.     Bring a paper prescription for each of these medications     traMADol 50 MG tablet               Information about OPIOIDS     PRESCRIPTION OPIOIDS: WHAT YOU NEED TO KNOW   We gave you an opioid (narcotic) pain medicine. It is important to manage your pain, but opioids are not always the best choice. You should first try all the other options your care team gave you. Take this medicine for as short a time (and as few doses) as possible.    Some activities can increase your pain, such as bandage changes or therapy sessions. It may help to take your pain medicine 30 to 60 minutes before these activities. Reduce your stress by getting enough sleep, working on hobbies you enjoy and practicing relaxation or meditation. Talk to your care team about ways to manage your pain beyond prescription  opioids.    These medicines have risks:    DO NOT drive when on new or higher doses of pain medicine. These medicines can affect your alertness and reaction times, and you could be arrested for driving under the influence (DUI). If you need to use opioids long-term, talk to your care team about driving.    DO NOT operate heavy machinery    DO NOT do any other dangerous activities while taking these medicines.    DO NOT drink any alcohol while taking these medicines.     If the opioid prescribed includes acetaminophen, DO NOT take with any other medicines that contain acetaminophen. Read all labels carefully. Look for the word  acetaminophen  or  Tylenol.  Ask your pharmacist if you have questions or are unsure.    You can get addicted to pain medicines, especially if you have a history of addiction (chemical, alcohol or substance dependence). Talk to your care team about ways to reduce this risk.    All opioids tend to cause constipation. Drink plenty of water and eat foods that have a lot of fiber, such as fruits, vegetables, prune juice, apple juice and high-fiber cereal. Take a laxative (Miralax, milk of magnesia, Colace, Senna) if you don t move your bowels at least every other day. Other side effects include upset stomach, sleepiness, dizziness, throwing up, tolerance (needing more of the medicine to have the same effect), physical dependence and slowed breathing.    Store your pills in a secure place, locked if possible. We will not replace any lost or stolen medicine. If you don t finish your medicine, please throw away (dispose) as directed by your pharmacist. The [place] [employer]ution [employer] has more information about safe disposal: [email or web address]         Primary Care Provider Office Phone # Fax #    Tiffany King MD PhD [phone] [address]  [facility] [address]        Goals        General    I will continue to monitor my  blood sugars and treat the results as directed. (pt-stated)     Notes - Note created  12/9/2015 10:26 AM by Randy Suarez RN    As of today's date 12/9/2015 goal is met at 0 - 25%.   Goal Status:  Active          Equal Access to Services     KATRIN RIGGS AH: Hadii aad ku hadpatitoo Soterrellali, waaxda luqadaha, qaybta kaalmada adeegyada, kim jonoin hayaan meredithchristopher sanders lacharlottebrenden gila. So [facility] [phone].    ATENCIÓN: Si habla español, tiene a lopez disposición servicios gratuitos de asistencia lingüística. Llame al [phone].    We comply with applicable federal civil rights laws and [place] laws. We do not discriminate on the basis of race, color, national origin, age, disability, sex, sexual orientation, or gender identity.            Thank you!     Thank you for choosing [facility]  for your care. Our goal is always to provide you with excellent care. Hearing back from our patients is one way we can continue to improve our services. Please take a few minutes to complete the written survey that you may receive in the mail after your visit with us. Thank you!             Your Updated Medication List - Protect others around you: Learn how to safely use, store and throw away your medicines at www.disposemymeds.org.          This list is accurate as of 8/8/18  2:36 PM.  Always use your most recent med list.                   Brand Name Dispense Instructions for use Diagnosis    ARIPiprazole 20 MG tablet    ABILIFY     Take 1 tablet (20 mg) by mouth daily Prescribed by Dr. Isael Jim[facility]nez at [facility]    Visual hallucination, Severe recurrent major depressive disorder with psychotic features (H)       B-COMPLEX/B-12 PO      Take 1 tablet by mouth daily.        BASAGLAR 100 UNIT/ML injection     16.5 mL    Inject 55 Units Subcutaneous At Bedtime    Type 2 diabetes mellitus with hyperglycemia, with long-term current use of insulin (H)       blood glucose monitoring lancets     9 Box    Use to test  blood sugar 8 times daily or as directed.    Type 2 diabetes mellitus, uncontrolled (H)       blood glucose monitoring meter device kit     1 kit    Use to test blood sugars 4 times daily or as directed.    Uncontrolled type 2 diabetes mellitus with hyperglycemia, with long-term current use of insulin (H)       blood glucose monitoring test strip    ACCU-CHEK CELIA    250 each    Use to test blood sugars 8 times daily or as directed.    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)       BusPIRone HCl 30 MG Tabs     60 tablet    TAKE 1 TABLET BY MOUTH TWICE A DAY    Anxiety       [facility] ASPIRIN ADULT LOW DOSE 81 MG chewable tablet   Generic drug:  aspirin     90 tablet    TAKE 1 TABLET (81 MG) BY MOUTH DAILY ***OTC- NOT COVERED BY INS***    Uncontrolled diabetes mellitus (H)       enalapril 2.5 MG tablet    VASOTEC    90 tablet    TAKE 1 TABLET BY MOUTH EVERY DAY    Hypertension goal BP (blood pressure) < 140/90       gabapentin 300 MG capsule    NEURONTIN    360 capsule    TAKE ONE CAPSULE BY MOUTH TWICE A DAY TAKE 2 CAPSULES BY MOUTH IN THE EVENING    Diabetic polyneuropathy associated with type 2 diabetes mellitus (H)       ibuprofen 800 MG tablet    ADVIL/MOTRIN    30 tablet    Take 1 tablet (800 mg) by mouth every 8 hours as needed for moderate pain (with food)    Closed fracture of fifth metatarsal bone of left foot, physeal involvement unspecified, initial encounter       insulin aspart 100 UNIT/ML injection    NovoLOG FLEXPEN    30 mL    Take as directed with meals. Total daily dose approx 30 units.    Type 2 diabetes mellitus, uncontrolled, with retinopathy (H)       insulin pen needle 31G X 5 MM     3 each    Use four times 4 day times a day        metFORMIN 1000 MG tablet    GLUCOPHAGE    180 tablet    TAKE 1  TABLET BY MOUTH 2 TIMES DAILY WITH MEALS    Uncontrolled type 2 diabetes mellitus with hyperglycemia, with long-term current use of insulin (H)       multivitamin, therapeutic with minerals Tabs tablet     30 each    Take 1 tablet by mouth daily    Severe major depression with psychotic features (H)       nystatin [id number] UNIT/GM Powd    MYCOSTATIN    60 g    Apply 1 dose topically 3 times daily as needed    Skin rash       omeprazole 20 MG CR capsule    priLOSEC    180 capsule    TAKE 2 CAPSULES BY MOUTH DAILY TAKE 30-60 MINUTES BEFORE A MEAL.    Gastroesophageal reflux disease without esophagitis       order for DME     1 Units    Equipment being ordered: Cane    Knee osteoarthritis       order for DME     3 Month    Equipment being ordered:  Ostomy supplies.  Drain Pouch by [employer] #.    Also needs Barrier by [employer] #.    S/P colostomy (H)       * order for DME     1 Device    by Device route continuous Air walker -  Use as instructed    Ankle injury, left, initial encounter, Abnormal x-ray       * order for DME     1 Device    Equipment being ordered: Knee walker     Closed nondisplaced fracture of fifth metatarsal bone of left foot, initial encounter       PARoxetine 20 MG tablet    PAXIL     TAKE 3 TABLETS AT BEDTIME ([facility]    Severe major depression with psychotic features (H)       pioglitazone 30 MG tablet    ACTOS    90 tablet    Take 1 tablet (30 mg) by mouth daily    Uncontrolled type 2 diabetes mellitus with hyperglycemia, with long-term current use of insulin (H), Insulin resistance       * propranolol 60 MG 24 hr capsule    INDERAL LA    60 capsule    Take 1 capsule (60 mg) by mouth daily May take a second dose if tremor recurs.    Tremor       * propranolol 60 MG 24 hr capsule    INDERAL LA    60 capsule    Take 1 capsule (60 mg) by mouth See Admin Instructions Take one po q day.  May repeat dose if tremor persists.    Tremor        simvastatin 20 MG tablet    ZOCOR    30 tablet    TAKE 1 TABLET (20 MG) BY MOUTH AT BEDTIME (DUE FOR OFFICE VISIT FOR FUTURE REFILLS)    High cholesterol       traMADol 50 MG tablet    ULTRAM    60 tablet    Take 1 tablet (50 mg) by mouth 2 times daily as needed for severe pain    Arthritis of knee, Arthralgia of both knees       Universal Remover Wipes Misc     1 Each    uses to remove barrier from colostomy bag at time of change    Colostomy       vitamin D 1000 units capsule     90 capsule    Take 2,000 to 4,000 Units per day        * Notice:  This list has 4 medication(s) that are the same as other medications prescribed for you. Read the directions carefully, and ask your doctor or other care provider to review them with you.

## 2018-08-08 NOTE — LETTER
Controlled Substance Agreement  I understand that my care provider has prescribed controlled substances (narcotics, tranquilizers, and/or stimulants) to help manage my condition(s).  I am taking this medicine to help me function or work.  I know that this is strong medicine.  It could have serious side effects and even cause a dependency on the drug.  If I stop these medicines suddenly, I could have severe withdrawal symptoms.    The risks, benefits, and side effects of these medication(s) were explained to me.  I agree that:  1. I will take part in other treatments as advised by my provider.  This may be psychiatry or counseling, physical therapy, behavioral therapy, group treatment, or a referral to a pain clinic.  I will reduce or stop my medicine when my provider tells me to do so.   2. I will take my medicines as prescribed.  I will not change the dose or schedule unless my provider tells me to.  There will be no refills if I  run out early.   I may be contacted at any time without warning and asked to complete a drug test or pill count.   3. I will keep all my appointments at the clinic.  If I miss appointments or fail to follow instructions, my provider may stop my medicine.  4. I will not ask other providers to prescribe controlled substances.  And I will not accept controlled substances from other people.  If I need a prescribed controlled substance for another reason, I will notify my provider.    5. I will use one pharmacy to fill all of my controlled substance prescriptions.  If my prescription is mailed to my pharmacy, it may take 5 to 7 days for my medicine to be ready.  6. I understand that my provider, clinic care team, and pharmacy can track controlled substance prescriptions from other providers through a central database (prescription monitoring program).  7. I will bring in my list of medications (or my medicine bottles) each time I come to the clinic.  8. Refills of controlled substances will be  made only during office hours.  It is up to me to make sure that I do not run out of my medicines on weekends or holidays.     9. I am responsible for my prescriptions.  If the medicine is lost or stolen, it will not be replaced.   I also agree not to share these medicines with anyone.  10. I agree to not use ANY illegal or recreational drugs.  This includes marijuana, cocaine, bath salts or other drugs.  I agree not to use alcohol unless my provider says I may.  I agree to give urine samples whenever asked.  If I fail to give a urine sample, the provider may stop my medicine.     11. I will tell my nurse or provider right away if I become pregnant or have a new medical problem treated outside of [facility].  12. I understand that this medicine can affect my thinking and judgment.  It may be unsafe for me to drive, use machinery and do dangerous tasks.  I will not do any of these things until I know how the medicine affects me.  If my dose changes, I will wait to see how it affects me.  I will contact my provider if I have concerns about medicine side effects.  I understand that if I do not follow any of the conditions above, my prescriptions or treatment may be stopped.    I agree that my provider, clinic care team, and pharmacy may work with any city, state or federal law enforcement agency that investigates the misuse, sale, or other diversion of my controlled medicine. I will allow my provider to discuss my care with or share a copy of this agreement with any other treating provider, pharmacy or emergency room where I receive care.  I agree to give up (waive) any right of privacy or confidentiality with respect to these authorizations.   I have read this agreement and have asked questions about anything I did not understand.   ___________________________________    ___________________________  Patient Signature                                                   Date  ___________________________________      ____________________________  Witness                                                                    Date  ___________________________________  Libin , MD PhD

## 2018-08-09 LAB — HIV 1+2 AB+HIV1 P24 AG SERPL QL IA: NONREACTIVE

## 2018-08-11 DIAGNOSIS — F41.9 ANXIETY: ICD-10-CM

## 2018-08-12 LAB — PAIN DRUG SCR UR W RPTD MEDS: NORMAL

## 2018-08-13 RX ORDER — BUSPIRONE HYDROCHLORIDE 30 MG/1
TABLET ORAL
Qty: 180 TABLET | Refills: 0 | Status: SHIPPED | OUTPATIENT
Start: 2018-08-13 | End: 2018-11-27

## 2018-08-13 NOTE — TELEPHONE ENCOUNTER
BusPIRone HCl 30 MG TABS 60 tablet 0 7/11/2018  No   Sig: TAKE 1 TABLET BY MOUTH TWICE A DAY     Last OV with Dr. King: 8/8/2018 -- clinic follow up in 3 months.    Next 5 appointments (look out 90 days)     Aug 15, 2018  3:40 PM CDT   Return Visit with Santy Harrington DO   [facility])    [address] [place] [id number]   [phone]            Aug 17, 2018  1:00 PM CDT   Return Visit with Mg Cde Provider   [facility])    [address] [place] [id number] [phone]                Atypical Antidepressants Protocol Passed8/11 1:12 AM   Recent (12 mo) or future (30 days) visit within the authorizing provider's specialty    Patient is age 18 or older    No active pregnancy on record    No positive pregnancy test in past 12 mos     Refilled per [facility] protocol.    Helena Dickson RN

## 2018-08-15 ENCOUNTER — OFFICE VISIT (OUTPATIENT)
Dept: ORTHOPEDICS | Facility: CLINIC | Age: 63
End: 2018-08-15
Payer: MEDICARE

## 2018-08-15 ENCOUNTER — TELEPHONE (OUTPATIENT)
Dept: PEDIATRICS | Facility: CLINIC | Age: 63
End: 2018-08-15

## 2018-08-15 VITALS — HEART RATE: 69 BPM | OXYGEN SATURATION: 93 % | SYSTOLIC BLOOD PRESSURE: 138 MMHG | DIASTOLIC BLOOD PRESSURE: 70 MMHG

## 2018-08-15 DIAGNOSIS — F33.3 SEVERE RECURRENT MAJOR DEPRESSIVE DISORDER WITH PSYCHOTIC FEATURES (H): ICD-10-CM

## 2018-08-15 DIAGNOSIS — R44.1 VISUAL HALLUCINATION: ICD-10-CM

## 2018-08-15 DIAGNOSIS — M17.12 PRIMARY OSTEOARTHRITIS OF LEFT KNEE: Primary | ICD-10-CM

## 2018-08-15 PROCEDURE — 99207 ZZC NO CHARGE LOS: CPT | Performed by: FAMILY MEDICINE

## 2018-08-15 PROCEDURE — 20610 DRAIN/INJ JOINT/BURSA W/O US: CPT | Mod: LT | Performed by: FAMILY MEDICINE

## 2018-08-15 RX ORDER — ARIPIPRAZOLE 5 MG/1
5 TABLET ORAL AT BEDTIME
Qty: 30 TABLET | Refills: 1 | COMMUNITY
Start: 2018-08-15 | End: 2022-12-29

## 2018-08-15 RX ORDER — TRIAMCINOLONE ACETONIDE 40 MG/ML
40 INJECTION, SUSPENSION INTRA-ARTICULAR; INTRAMUSCULAR ONCE
Qty: 1 ML | Refills: 0 | OUTPATIENT
Start: 2018-08-15 | End: 2018-08-15

## 2018-08-15 RX ORDER — ARIPIPRAZOLE 20 MG/1
20 TABLET ORAL DAILY
COMMUNITY
Start: 2018-08-15 | End: 2019-08-14 | Stop reason: DRUGHIGH

## 2018-08-15 RX ORDER — PAROXETINE 20 MG/1
TABLET, FILM COATED ORAL
Qty: 30 TABLET | COMMUNITY
Start: 2018-08-15 | End: 2023-07-05

## 2018-08-15 RX ORDER — CHOLECALCIFEROL (VITAMIN D3) 50 MCG
2000 TABLET ORAL DAILY
Qty: 100 TABLET | Refills: 3 | Status: SHIPPED | OUTPATIENT
Start: 2018-08-15 | End: 2019-10-04

## 2018-08-15 ASSESSMENT — PAIN SCALES - GENERAL: PAINLEVEL: WORST PAIN (10)

## 2018-08-15 NOTE — PATIENT INSTRUCTIONS
Thanks for coming today.  Ortho[facility] Medicine Clinic  [address] [place] [address]    To schedule future appointments in Ortho Clinic, you may call [phone].    To schedule ordered imaging by your provider:   Call Central Imaging [phone]    To schedule an injection ordered by your provider:  Call Central Imaging Injection scheduling line: [phone]  [facility] available online at:  [email or web address]    Please call if any further questions or concerns ([phone]).  Clinic hours 8 am to 5 pm.    Return to clinic (call) if symptoms worsen or fail to improve.

## 2018-08-15 NOTE — LETTER
8/15/2018         RE: Holly Muir  [address] [facility] [id number]        Dear Colleague,    Thank you for referring your patient, Holly Muir, to the [facility]. Please see a copy of my visit note below.    HISTORY OF PRESENT ILLNESS  Ms. Muir is a pleasant 63 year old year old female following up with left knee pain.  Holly and I last visited in December of last year.  At that visit I performed a corticosteroid injection in her knee.  This helped her quite a bit until about a month ago.  She was at [facility] and noticed that her knee began to bother her after walking around quite a bit.  She did not read any of the rides.  Throughout the day her knee became swollen and painful.  Since that time she has been wrapping her knee with an Ace wrap, this helps a bit.  She is interested in a repeat injection today.    Additional history: as documented      REVIEW OF SYSTEMS (8/15/2018)  10 point ROS of systems including Constitutional, Eyes, Respiratory, Cardiovascular, Gastroenterology, Genitourinary, Integumentary, Musculoskeletal, Psychiatric were all negative except for pertinent positives noted in my HPI.     PHYSICAL EXAM  Vitals:    08/15/18 1529   BP: 138/70   Pulse: 69   SpO2: 93%     General  - normal appearance, in no obvious distress  CV  - normal popliteal pulse  Pulm  - normal respiratory pattern, non-labored  Musculoskeletal - left knee  - stance: mildly antalgic gait,  - inspection: generalized swelling  - palpation: medial joint line tenderness, patella and patellar tendon non-tender, normal popliteal pulse  - ROM: 100 degrees flexion, 0 degrees extension  - strength: 5/5 in flexion, 5/5 in extension    Neuro  - no sensory or motor deficit, grossly normal coordination, normal muscle tone  Skin  - no ecchymosis, erythema, warmth, or induration, no obvious rash  Psych  - interactive, appropriate, normal mood and affect          ASSESSMENT & PLAN  Ms.  Wood is a 63 year old year old female following up with left knee osteoarthritis.    After some discussion I did inject her knee today (see procedure note).    She should continue to use her knee wrap as long as this is helpful.  She should also continue to use oral analgesics as needed.    We can follow-up in the future if her pain presents again.    It was a pleasure seeing Holly.    PROCEDURE    Knee Injection - Intraarticular  The patient was informed of the risks and the benefits of the procedure and a written consent was signed.  The patient s left knee was prepped with chlorhexidine in sterile fashion.   40 mg of triamcinolone suspension was drawn up into a 5 mL syringe with 2 mL of 1% lidocaine and 2 mL of 0.5% marcaine.  Injection was performed using substerile technique.  A 1.5-inch 25-gauge needle was used to enter the lateral aspect of the left knee.  Injection performed successfully without difficulty.  There were no complications. The patient tolerated the procedure well. There was negligible bleeding.   The patient was instructed to ice the knee upon leaving clinic and refrain from overuse over the next 3 days.   The patient was instructed to call or go to the emergency room with any unusual pain, swelling, redness, or if otherwise concerned.  A follow up appointment will be scheduled to evaluate response to the injection, and to assess range of motion and pain.  Kenalog: NDC 8565-1517-35        Santy Harrington DO, LANA          Again, thank you for allowing me to participate in the care of your patient.        Sincerely,        Santy Hrarington DO

## 2018-08-15 NOTE — NURSING NOTE
Holly Muir's chief complaint for this visit includes:  Chief Complaint   Patient presents with     RECHECK     left knee pain      PCP: Tiffany King    Referring Provider:  Tiffany King MD PhD  [address] [place] [address]    /70  Pulse 69  SpO2 93%  Worst Pain (10)     Do you need any medication refills at today's visit? No

## 2018-08-15 NOTE — TELEPHONE ENCOUNTER
8.15.18   [facility]    ABILIFY takes 20mg and 5mg to equal 25mg. The patient does not take VIT B anymore. PAXIL changed to 2 tabs at bedtime.  PROPRANOLOL twice a day.     Can Dr King send a script for VIT D to pharmacy.     [facility]/PHARMACY #[id number] - [place], [place] - [address] [address]    These are changes that were done compared with the after summary visit.  Please call if any other changes or discrepancies.

## 2018-08-15 NOTE — PROGRESS NOTES
HISTORY OF PRESENT ILLNESS  Ms. Muir is a pleasant 63 year old year old female following up with left knee pain.  Holly and I last visited in December of last year.  At that visit I performed a corticosteroid injection in her knee.  This helped her quite a bit until about a month ago.  [place] was [place] and noticed that her knee began to bother her after walking around quite a bit.  She did not read any of the rides.  Throughout the day her knee became swollen and painful.  Since that time she has been wrapping her knee with an Ace wrap, this helps a bit.  She is interested in a repeat injection today.    Additional history: as documented      REVIEW OF SYSTEMS (8/15/2018)  10 point ROS of systems including Constitutional, Eyes, Respiratory, Cardiovascular, Gastroenterology, Genitourinary, Integumentary, Musculoskeletal, Psychiatric were all negative except for pertinent positives noted in my HPI.     PHYSICAL EXAM  Vitals:    08/15/18 1529   BP: 138/70   Pulse: 69   SpO2: 93%     General  - normal appearance, in no obvious distress  CV  - normal popliteal pulse  Pulm  - normal respiratory pattern, non-labored  Musculoskeletal - left knee  - stance: mildly antalgic gait,  - inspection: generalized swelling  - palpation: medial joint line tenderness, patella and patellar tendon non-tender, normal popliteal pulse  - ROM: 100 degrees flexion, 0 degrees extension  - strength: 5/5 in flexion, 5/5 in extension    Neuro  - no sensory or motor deficit, grossly normal coordination, normal muscle tone  Skin  - no ecchymosis, erythema, warmth, or induration, no obvious rash  Psych  - interactive, appropriate, normal mood and affect          ASSESSMENT & PLAN  Ms. Muir is a 63 year old year old female following up with left knee osteoarthritis.    After some discussion I did inject her knee today (see procedure note).    She should continue to use her knee wrap as long as this is helpful.  She should also continue to  use oral analgesics as needed.    We can follow-up in the future if her pain presents again.    It was a pleasure seeing Holly.    PROCEDURE    Knee Injection - Intraarticular  The patient was informed of the risks and the benefits of the procedure and a written consent was signed.  The patient s left knee was prepped with chlorhexidine in sterile fashion.   40 mg of triamcinolone suspension was drawn up into a 5 mL syringe with 2 mL of 1% lidocaine and 2 mL of 0.5% marcaine.  Injection was performed using substerile technique.  A 1.5-inch 25-gauge needle was used to enter the lateral aspect of the left knee.  Injection performed successfully without difficulty.  There were no complications. The patient tolerated the procedure well. There was negligible bleeding.   The patient was instructed to ice the knee upon leaving clinic and refrain from overuse over the next 3 days.   The patient was instructed to call or go to the emergency room with any unusual pain, swelling, redness, or if otherwise concerned.  A follow up appointment will be scheduled to evaluate response to the injection, and to assess range of motion and pain.  Kenalog: NDC 0912-8829-41        Santy Harrington DO, LANA

## 2018-08-15 NOTE — MR AVS SNAPSHOT
After Visit Summary   8/15/2018    Holly Muir    MRN: [id number]           Patient Information     Date Of Birth          1955        Visit Information        Provider Department      8/15/2018 3:40 PM Santy Harrington, DO [facility]        Today's Diagnoses     Primary osteoarthritis of left knee    -  1      Care Instructions    Thanks for coming today.  Ortho/Sports Medicine Clinic  [address] [place] [address]    To schedule future appointments in Ortho Clinic, you may call [phone].    To schedule ordered imaging by your provider:   Call Central Imaging [phone]    To schedule an injection ordered by your provider:  Call Central Imaging Injection scheduling line: [phone]  [employer] available online at:  [email or web address]    Please call if any further questions or concerns ([phone]).  Clinic hours 8 am to 5 pm.    Return to clinic (call) if symptoms worsen or fail to improve.            Follow-ups after your visit        Your next 10 appointments already scheduled     Aug 17, 2018 12:30 PM CDT   MA SCREENING DIGITAL BILATERAL with MGMA1, MG MA TECH   [facility] ([facility]    [address] [place] [address] [phone]           Do not use any powder, lotion or deodorant under your arms or on your breast. If you do, we will ask you to remove it before your exam.  Wear comfortable, two-piece clothing.  If you have any allergies, tell your care team.  Bring any previous mammograms from other facilities or have them mailed to the breast center. Three-dimensional (3D) mammograms are available at [place] locations in [facility], [facility], [facility], [place], [place], [place], and [place]. [facility] locations include [place] and [facility] in [place]. Benefits of 3D mammograms include: - Improved rate of cancer detection - Decreases your  "chance of having to go back for more tests, which means fewer: - \"False-positive\" results (This means that there is an abnormal area but it isn't cancer.) - Invasive testing procedures, such as a biopsy or surgery - Can provide clearer images of the breast if you have dense breast tissue. 3D mammography is an optional exam that anyone can have with a 2D mammogram. It doesn't replace or take the place of a 2D mammogram. 2D mammograms remain an effective screening test for all women.  Not all insurance companies cover the cost of a 3D mammogram. Check with your insurance.            Aug 17, 2018  1:00 PM CDT   Return Visit with Mg Cde Provider   [facility]    [address] [place] [address] [phone]            Nov 14, 2018  1:10 PM CST   Return Visit with Tiffany King MD PhD   [facility])    [address] [place] [address] [phone]              Who to contact     If you have questions or need follow up information about today's clinic visit or your schedule please contact [facility] directly at [phone].  Normal or non-critical lab and imaging results will be communicated to you by MyChart, letter or phone within 4 business days after the clinic has received the results. If you do not hear from us within 7 days, please contact the clinic through MyChart or phone. If you have a critical or abnormal lab result, we will notify you by phone as soon as possible.  Submit refill requests through [employer] or call your pharmacy and they will forward the refill request to us. Please allow 3 business days for your refill to be completed.          Additional Information About Your Visit        Care EveryWhere ID     This is your Care EveryWhere ID. This could be used by other organizations to access your [facility] medical records  [id number]        Your Vitals Were     Pulse " Pulse Oximetry                69 93%           Blood Pressure from Last 3 Encounters:   08/15/18 138/70   08/08/18 90/62   12/31/17 110/60    Weight from Last 3 Encounters:   08/08/18 87.1 kg (192 lb)   02/15/18 90.7 kg (200 lb)   01/25/18 88.9 kg (196 lb)              We Performed the Following     DRAIN/INJECT LARGE JOINT/BURSA          Today's Medication Changes          These changes are accurate as of 8/15/18  4:12 PM.  If you have any questions, ask your nurse or doctor.               Start taking these medicines.        Dose/Directions    triamcinolone acetonide 40 MG/ML injection   Commonly known as:  KENALOG   Used for:  Primary osteoarthritis of left knee   Started by:  Santy Harrington DO        Dose:  40 mg   1 mL (40 mg) by INTRA-ARTICULAR route once for 1 dose   Quantity:  1 mL   Refills:  0       vitamin D 2000 units tablet   Used for:  Severe recurrent major depressive disorder with psychotic features (H)   Started by:  Tiffany King MD PhD        Dose:  2000 Units   Take 2,000 Units by mouth daily   Quantity:  100 tablet   Refills:  3         These medicines have changed or have updated prescriptions.        Dose/Directions    * ARIPiprazole 20 MG tablet   Commonly known as:  ABILIFY   This may have changed:  additional instructions   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)   Changed by:  Tiffany King MD PhD        Dose:  20 mg   Take 1 tablet (20 mg) by mouth daily Along with 5mg tab to total 25mg.  Prescribed by Dr. Isael Jim[facility]nez at [facility]   Refills:  0       * ARIPiprazole 5 MG tablet   Commonly known as:  ABILIFY   This may have changed:  Another medication with the same name was changed. Make sure you understand how and when to take each.   Used for:  Severe recurrent major depressive disorder with psychotic features (H)   Changed by:  Tiffany King MD PhD        Dose:  5 mg   Take 1 tablet (5 mg) by mouth At Bedtime Along  with 5mg tab to total 25mg.  Prescribed by Dr. Isael Jiménez at [facility]   Quantity:  30 tablet   Refills:  1       PARoxetine 20 MG tablet   Commonly known as:  PAXIL   This may have changed:  additional instructions   Used for:  Severe recurrent major depressive disorder with psychotic features (H)   Changed by:  Tiffany King MD PhD        TAKE 2 TABLETS AT BEDTIME (Prescribed by Dr. Isael Jiménez at [facility])   Quantity:  30 tablet   Refills:  0       * Notice:  This list has 2 medication(s) that are the same as other medications prescribed for you. Read the directions carefully, and ask your doctor or other care provider to review them with you.         Where to get your medicines      These medications were sent to [facility]/pharmacy #[id number] - [place], [place] - [address] [place] [address]     Phone:  [phone]     vitamin D 2000 units tablet         Some of these will need a paper prescription and others can be bought over the counter.  Ask your nurse if you have questions.     You don't need a prescription for these medications     triamcinolone acetonide 40 MG/ML injection                Primary Care Provider Office Phone # Fax #    Tiffany King MD PhD [phone]       [address]  [facility] [address]        Goals        General    I will continue to monitor my blood sugars and treat the results as directed. (pt-stated)     Notes - Note created  12/9/2015 10:26 AM by Randy Suarez RN    As of today's date 12/9/2015 goal is met at 0 - 25%.   Goal Status:  Active          Equal Access to Services     : Hadii aad baudilio hadasho Soomaali, waaxda luqadaha, qaybta kaalmada adekim aviles . So [facility] [phone].    ATENCIÓN: Si habla español, tiene a lopez disposición servicios gratuitos de asistencia lingüística. Llame al [phone].    We comply with applicable federal civil  rights laws and [place] laws. We do not discriminate on the basis of race, color, national origin, age, disability, sex, sexual orientation, or gender identity.            Thank you!     Thank you for choosing [facility]  for your care. Our goal is always to provide you with excellent care. Hearing back from our patients is one way we can continue to improve our services. Please take a few minutes to complete the written survey that you may receive in the mail after your visit with us. Thank you!             Your Updated Medication List - Protect others around you: Learn how to safely use, store and throw away your medicines at www.disposemymeds.org.          This list is accurate as of 8/15/18  4:12 PM.  Always use your most recent med list.                   Brand Name Dispense Instructions for use Diagnosis    * ARIPiprazole 20 MG tablet    ABILIFY     Take 1 tablet (20 mg) by mouth daily Along with 5mg tab to total 25mg.  Prescribed by Dr. Isael Jiménez at [facility]    Visual hallucination, Severe recurrent major depressive disorder with psychotic features (H)       * ARIPiprazole 5 MG tablet    ABILIFY    30 tablet    Take 1 tablet (5 mg) by mouth At Bedtime Along with 5mg tab to total 25mg.  Prescribed by Dr. Isael Jiménez at [facility]    Severe recurrent major depressive disorder with psychotic features (H)       BASAGLAR 100 UNIT/ML injection     16.5 mL    Inject 55 Units Subcutaneous At Bedtime    Type 2 diabetes mellitus with hyperglycemia, with long-term current use of insulin (H)       blood glucose monitoring lancets     9 Box    Use to test blood sugar 8 times daily or as directed.    Type 2 diabetes mellitus, uncontrolled (H)       blood glucose monitoring meter device kit     1 kit    Use to test blood sugars 4 times daily or as directed.    Uncontrolled type 2 diabetes mellitus with hyperglycemia, with long-term current use of insulin (H)       blood  glucose monitoring test strip    ACCU-CHEK CELIA    250 each    Use to test blood sugars 8 times daily or as directed.    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)       BusPIRone HCl 30 MG Tabs     180 tablet    TAKE 1 TABLET BY MOUTH TWICE A DAY    Anxiety       [facility] ASPIRIN ADULT LOW DOSE 81 MG chewable tablet   Generic drug:  aspirin     90 tablet    TAKE 1 TABLET (81 MG) BY MOUTH DAILY ***OTC- NOT COVERED BY INS***    Uncontrolled diabetes mellitus (H)       enalapril 2.5 MG tablet    VASOTEC    90 tablet    TAKE 1 TABLET BY MOUTH EVERY DAY    Hypertension goal BP (blood pressure) < 140/90       gabapentin 300 MG capsule    NEURONTIN    360 capsule    TAKE ONE CAPSULE BY MOUTH TWICE A DAY TAKE 2 CAPSULES BY MOUTH IN THE EVENING    Diabetic polyneuropathy associated with type 2 diabetes mellitus (H)       ibuprofen 800 MG tablet    ADVIL/MOTRIN    30 tablet    Take 1 tablet (800 mg) by mouth every 8 hours as needed for moderate pain (with food)    Closed fracture of fifth metatarsal bone of left foot, physeal involvement unspecified, initial encounter       insulin aspart 100 UNIT/ML injection    NovoLOG FLEXPEN    30 mL    Take as directed with meals. Total daily dose approx 30 units.    Type 2 diabetes mellitus, uncontrolled, with retinopathy (H)       insulin pen needle 31G X 5 MM     3 each    Use four times 4 day times a day        metFORMIN 1000 MG tablet    GLUCOPHAGE    180 tablet    TAKE 1 TABLET BY MOUTH 2 TIMES DAILY WITH MEALS    Uncontrolled type 2 diabetes mellitus with hyperglycemia, with long-term current use of insulin (H)       multivitamin, therapeutic with minerals Tabs tablet     30 each    Take 1 tablet by mouth daily    Severe major depression with psychotic features (H)       nystatin 539002  UNIT/GM Powd    MYCOSTATIN    60 g    Apply 1 dose topically 3 times daily as needed    Skin rash       omeprazole 20 MG CR capsule    priLOSEC    180 capsule    TAKE 2 CAPSULES BY MOUTH DAILY TAKE 30-60 MINUTES BEFORE A MEAL.    Gastroesophageal reflux disease without esophagitis       order for DME     1 Units    Equipment being ordered: Cane    Knee osteoarthritis       order for DME     3 Month    Equipment being ordered:  Ostomy supplies.  Drain Pouch by Valarie #.    Also needs Barrier by Valarie #.    S/P colostomy (H)       * order for DME     1 Device    by Device route continuous Air walker -  Use as instructed    Ankle injury, left, initial encounter, Abnormal x-ray       * order for DME     1 Device    Equipment being ordered: Knee walker     Closed nondisplaced fracture of fifth metatarsal bone of left foot, initial encounter       PARoxetine 20 MG tablet    PAXIL    30 tablet    TAKE 2 TABLETS AT BEDTIME (Prescribed by Dr. Isael Jiménez at [facility])    Severe recurrent major depressive disorder with psychotic features (H)       pioglitazone 30 MG tablet    ACTOS    90 tablet    Take 1 tablet (30 mg) by mouth daily    Uncontrolled type 2 diabetes mellitus with hyperglycemia, with long-term current use of insulin (H), Insulin resistance       propranolol 60 MG 24 hr capsule    INDERAL LA    60 capsule    Take 1 capsule (60 mg) by mouth See Admin Instructions Take one po q day.  May repeat dose if tremor persists.    Tremor       simvastatin 20 MG tablet    ZOCOR    30 tablet    TAKE 1 TABLET (20 MG) BY MOUTH AT BEDTIME (DUE FOR OFFICE VISIT FOR FUTURE REFILLS)    High cholesterol       traMADol 50 MG tablet    ULTRAM    60 tablet    Take 1 tablet (50 mg) by mouth 2 times daily as needed for severe pain    Arthritis of knee, Arthralgia of both knees       triamcinolone acetonide 40 MG/ML injection    KENALOG    1 mL    1 mL (40 mg) by INTRA-ARTICULAR route once for 1 dose     Primary osteoarthritis of left knee       Universal Remover Wipes Misc     1 Each    uses to remove barrier from colostomy bag at time of change    Colostomy       vitamin D 2000 units tablet     100 tablet    Take 2,000 Units by mouth daily    Severe recurrent major depressive disorder with psychotic features (H)       * Notice:  This list has 4 medication(s) that are the same as other medications prescribed for you. Read the directions carefully, and ask your doctor or other care provider to review them with you.

## 2018-08-15 NOTE — TELEPHONE ENCOUNTER
RN updated vitamin B, paxil, abilify doses in medications tab.    RN did not change propranolol dosing as it already is twice a day.  Ordered by .  Home care has been putting in her pill box twice a day and this has been helping her tremors.    propranolol (INDERAL LA) 60 MG 24 hr capsule 60 capsule 11 12/5/2017  No      Sig - Route: Take 1 capsule (60 mg) by mouth See Admin Instructions Take one po q day.  May repeat dose if tremor persists. - Oral     Class: E-Prescribe     Order: [id number]     E-Prescribing Status: Receipt confirmed by pharmacy (12/5/2017  8:46 AM CST)       ______________________________________________________________________    Routing refill request to provider for review/approval because:  Medication is reported/historical       Disp Refills Start End KARLA      Cholecalciferol (VITAMIN D) 1000 UNITS capsule 90 capsule  9/17/2013  --     Sig: Take 2,000 to 4,000 Units per day     Home care nurse states that patient has not been taking the vitamin D because she does not want to pay out of pocket.  REquesting if  wants patient on vitamin D to send RX to pharmacy.    25 OH Vit D2   Date Value Ref Range Status   02/03/2015 <5 ug/L Final     25 OH Vit D3   Date Value Ref Range Status   02/03/2015 49 ug/L Final     25 OH Vit D total   Date Value Ref Range Status   02/03/2015  30 - 75 ug/L Final    <54  Season, race, dietary intake, and treatment affect the concentration of   25-hydroxy-Vitamin D. Values may decrease during winter months and increase   during summer months. Values less than 30 ug/L may indicate Vitamin D   deficiency.   This test was developed and its performance characteristics determined by the   [facility],  Special Chemistry Laboratory. It has   not been cleared or approved by the FDA. The laboratory is regulated under CLIA   as qualified to perform high-complexity testing. This test is used for clinical   purposes. It should not be  regarded as investigational or for research.           Last office visit:  8/8/18      Zoraida Min RN,   [facility]

## 2018-08-17 ENCOUNTER — OFFICE VISIT (OUTPATIENT)
Dept: NURSING | Facility: CLINIC | Age: 63
End: 2018-08-17
Payer: MEDICARE

## 2018-08-17 ENCOUNTER — RADIANT APPOINTMENT (OUTPATIENT)
Dept: MAMMOGRAPHY | Facility: CLINIC | Age: 63
End: 2018-08-17
Attending: INTERNAL MEDICINE
Payer: MEDICARE

## 2018-08-17 DIAGNOSIS — E11.65 UNCONTROLLED TYPE 2 DIABETES MELLITUS WITH HYPERGLYCEMIA, WITH LONG-TERM CURRENT USE OF INSULIN (H): ICD-10-CM

## 2018-08-17 DIAGNOSIS — E11.22 TYPE 2 DIABETES MELLITUS WITH DIABETIC CHRONIC KIDNEY DISEASE (H): Primary | ICD-10-CM

## 2018-08-17 DIAGNOSIS — E88.819 INSULIN RESISTANCE: ICD-10-CM

## 2018-08-17 DIAGNOSIS — Z12.31 SCREENING MAMMOGRAM, ENCOUNTER FOR: ICD-10-CM

## 2018-08-17 DIAGNOSIS — Z79.4 UNCONTROLLED TYPE 2 DIABETES MELLITUS WITH HYPERGLYCEMIA, WITH LONG-TERM CURRENT USE OF INSULIN (H): ICD-10-CM

## 2018-08-17 PROCEDURE — G0108 DIAB MANAGE TRN  PER INDIV: HCPCS

## 2018-08-17 PROCEDURE — 77063 BREAST TOMOSYNTHESIS BI: CPT | Performed by: RADIOLOGY

## 2018-08-17 PROCEDURE — 77067 SCR MAMMO BI INCL CAD: CPT | Performed by: RADIOLOGY

## 2018-08-17 RX ORDER — INSULIN GLARGINE 100 [IU]/ML
INJECTION, SOLUTION SUBCUTANEOUS
Qty: 60 ML | Refills: 1 | Status: SHIPPED | OUTPATIENT
Start: 2018-08-17 | End: 2018-10-25

## 2018-08-17 RX ORDER — LANCETS
EACH MISCELLANEOUS
Qty: 510 EACH | Refills: 1 | Status: SHIPPED | OUTPATIENT
Start: 2018-08-17 | End: 2020-09-14

## 2018-08-17 RX ORDER — PIOGLITAZONEHYDROCHLORIDE 30 MG/1
30 TABLET ORAL DAILY
Qty: 90 TABLET | Refills: 1 | Status: SHIPPED | OUTPATIENT
Start: 2018-08-17 | End: 2019-09-20 | Stop reason: DRUGHIGH

## 2018-08-17 NOTE — PATIENT INSTRUCTIONS
1. Increase Basaglar dose to 55 units daily.     2. Take 2 units Novolog in the morning if fasting blood sugar is over 200, even if you are not eating breakfast.This is because you like to drink coffee with milk and milk has carbs in it.      3. Continue to take Novolog for meals: Breakfast: 6 units, Lunch and Dinner: 9 units.      4. Add on extra insulin at meal-time depending on what your pre-meal blood sugar reading is. This is called correction insulin.     200-250: add 2 units  251-300: add 4 units  301-350: add 5 units  > 350: add 6 units.     Mikayla Sellers, RN, BSN, CDE   [facility]

## 2018-08-17 NOTE — MR AVS SNAPSHOT
After Visit Summary   8/17/2018    Holly Muir    MRN: [id number]           Patient Information     Date Of Birth          1955        Visit Information        Provider Department      8/17/2018 1:00 PM Provider, Mg Mendez [facility]        Today's Diagnoses     Type 2 diabetes mellitus with diabetic chronic kidney disease (H)    -  1      Care Instructions    1. Increase Basaglar dose to 55 units daily.     2. Take 2 units Novolog in the morning if fasting blood sugar is over 200, even if you are not eating breakfast.This is because you like to drink coffee with milk and milk has carbs in it.      3. Continue to take Novolog for meals: Breakfast: 6 units, Lunch and Dinner: 9 units.      4. Add on extra insulin at meal-time depending on what your pre-meal blood sugar reading is. This is called correction insulin.     200-250: add 2 units  251-300: add 4 units  301-350: add 5 units  > 350: add 6 units.     Mikayla Sellers, RN, BSN, CDE   [facility]          Follow-ups after your visit        Your next 10 appointments already scheduled     Oct 19, 2018  3:00 PM CDT   Diabetes Education with Mg Mendez Provider   [facility] ([facility])    [id number] [place] [address] [place]  [address] [id number] [phone]            Nov 14, 2018  1:10 PM CST   Return Visit with Tiffany King MD PhD   [facility])    [id number] [place] [address] [id number] [phone]            Dec 21, 2018  2:15 PM CST   New Visit with Elizabeth Medrano MD   [facility])    [id number] [place] [address] [id number] [phone]              Who to contact     If you have questions or need follow up information about today's clinic visit or your schedule please contact [facility] directly at [phone].  Normal or non-critical lab  and imaging results will be communicated to you by MyChart, letter or phone within 4 business days after the clinic has received the results. If you do not hear from us within 7 days, please contact the clinic through MyChart or phone. If you have a critical or abnormal lab result, we will notify you by phone as soon as possible.  Submit refill requests through [employer] or call your pharmacy and they will forward the refill request to us. Please allow 3 business days for your refill to be completed.          Additional Information About Your Visit        Care EveryWhere ID     This is your Care EveryWhere ID. This could be used by other organizations to access your [place] medical records  [id number]         Blood Pressure from Last 3 Encounters:   08/15/18 138/70   08/08/18 90/62   12/31/17 110/60    Weight from Last 3 Encounters:   08/08/18 87.1 kg (192 lb)   02/15/18 90.7 kg (200 lb)   01/25/18 88.9 kg (196 lb)              We Performed the Following     DIABETES EDUCATION - Individual  []          Today's Medication Changes          These changes are accurate as of 8/17/18 11:59 PM.  If you have any questions, ask your nurse or doctor.               These medicines have changed or have updated prescriptions.        Dose/Directions    * BASAGLAR 100 UNIT/ML injection   This may have changed:  Another medication with the same name was added. Make sure you understand how and when to take each.   Used for:  Type 2 diabetes mellitus with hyperglycemia, with long-term current use of insulin (H)   Changed by:  Mikayla Sellers        Dose:  55 Units   Inject 55 Units Subcutaneous At Bedtime   Quantity:  16.5 mL   Refills:  3       * BASAGLAR 100 UNIT/ML injection   This may have changed:  You were already taking a medication with the same name, and this prescription was added. Make sure you understand how and when to take each.   Used for:  Type 2 diabetes mellitus, uncontrolled (H)   Changed by:  Mikayla Sellers         Take 55 units daily.   Quantity:  60 mL   Refills:  1       blood glucose monitoring lancets   This may have changed:  additional instructions   Used for:  Type 2 diabetes mellitus, uncontrolled (H)   Changed by:  Blue, Mikayla        Use to test blood sugar 6 times daily or as directed.   Quantity:  510 each   Refills:  1       blood glucose monitoring test strip   Commonly known as:  ACCU-CHEK CELIA   This may have changed:  additional instructions   Used for:  Type 2 diabetes mellitus, uncontrolled (H)   Changed by:  Blue, Mikayla        Use to test blood sugars 6 times daily or as directed.   Quantity:  600 each   Refills:  1       * Notice:  This list has 2 medication(s) that are the same as other medications prescribed for you. Read the directions carefully, and ask your doctor or other care provider to review them with you.         Where to get your medicines      These medications were sent to [facility]/pharmacy #[id number] - [place], [place] - [address] [place] [address] [place] [address] [place] [address]     Phone:  [phone]     BASAGLAR 100 UNIT/ML injection    blood glucose monitoring lancets    blood glucose monitoring test strip    insulin pen needle 31G X 5 MM    pioglitazone 30 MG tablet                Primary Care Provider Office Phone # Fax #    Tiffany King MD PhD [phone]       [address]  [facility] [address]        Goals        General    I will continue to monitor my blood sugars and treat the results as directed. (pt-stated)     Notes - Note created  12/9/2015 10:26 AM by Randy Suarez RN    As of today's date 12/9/2015 goal is met at 0 - 25%.   Goal Status:  Active          Equal Access to Services     [facility]: Hadii dharmesh Caban, waaxda luqadaha, qaybta kaalmada kim rosado . [facility] [phone].    ATENCIÓN: Si habla español, tiene a lopez disposición servicios gratuitos de asistencia lingüística.  Lakishaneftali lima [phone].    We comply with applicable federal civil rights laws and [place] laws. We do not discriminate on the basis of race, color, national origin, age, disability, sex, sexual orientation, or gender identity.            Thank you!     Thank you for choosing [facility]  for your care. Our goal is always to provide you with excellent care. Hearing back from our patients is one way we can continue to improve our services. Please take a few minutes to complete the written survey that you may receive in the mail after your visit with us. Thank you!             Your Updated Medication List - Protect others around you: Learn how to safely use, store and throw away your medicines at www.disposemymeds.org.          This list is accurate as of 8/17/18 11:59 PM.  Always use your most recent med list.                   Brand Name Dispense Instructions for use Diagnosis    * ARIPiprazole 20 MG tablet    ABILIFY     Take 1 tablet (20 mg) by mouth daily Along with 5mg tab to total 25mg.  Prescribed by Dr. Isael Jiménez at [facility]    Visual hallucination, Severe recurrent major depressive disorder with psychotic features (H)       * ARIPiprazole 5 MG tablet    ABILIFY    30 tablet    Take 1 tablet (5 mg) by mouth At Bedtime Along with 5mg tab to total 25mg.  Prescribed by Dr. Isael Jiménez at [facility]    Severe recurrent major depressive disorder with psychotic features (H)       * BASAGLAR 100 UNIT/ML injection     16.5 mL    Inject 55 Units Subcutaneous At Bedtime    Type 2 diabetes mellitus with hyperglycemia, with long-term current use of insulin (H)       * BASAGLAR 100 UNIT/ML injection     60 mL    Take 55 units daily.    Type 2 diabetes mellitus, uncontrolled (H)       blood glucose monitoring lancets     510 each    Use to test blood sugar 6 times daily or as directed.    Type 2 diabetes mellitus, uncontrolled (H)       blood glucose monitoring meter device kit      1 kit    Use to test blood sugars 4 times daily or as directed.    Uncontrolled type 2 diabetes mellitus with hyperglycemia, with long-term current use of insulin (H)       blood glucose monitoring test strip    ACCU-CHEK CELIA    600 each    Use to test blood sugars 6 times daily or as directed.    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)       BusPIRone HCl 30 MG Tabs     180 tablet    TAKE 1 TABLET BY MOUTH TWICE A DAY    Anxiety       [facility] ASPIRIN ADULT LOW DOSE 81 MG chewable tablet   Generic drug:  aspirin     90 tablet    TAKE 1 TABLET (81 MG) BY MOUTH DAILY ***OTC- NOT COVERED BY INS***    Uncontrolled diabetes mellitus (H)       enalapril 2.5 MG tablet    VASOTEC    90 tablet    TAKE 1 TABLET BY MOUTH EVERY DAY    Hypertension goal BP (blood pressure) < 140/90       gabapentin 300 MG capsule    NEURONTIN    360 capsule    TAKE ONE CAPSULE BY MOUTH TWICE A DAY TAKE 2 CAPSULES BY MOUTH IN THE EVENING    Diabetic polyneuropathy associated with type 2 diabetes mellitus (H)       ibuprofen 800 MG tablet    ADVIL/MOTRIN    30 tablet    Take 1 tablet (800 mg) by mouth every 8 hours as needed for moderate pain (with food)    Closed fracture of fifth metatarsal bone of left foot, physeal involvement unspecified, initial encounter       insulin aspart 100 UNIT/ML injection    NovoLOG FLEXPEN    30 mL    Take as directed with meals. Total daily dose approx 30 units.    Type 2 diabetes mellitus, uncontrolled, with retinopathy (H)       insulin pen needle 31G X 5 MM     400 each    Use four times 4 day times a day    Type 2 diabetes mellitus, uncontrolled (H)       metFORMIN 1000 MG tablet    GLUCOPHAGE    180 tablet    TAKE 1 TABLET BY MOUTH 2 TIMES DAILY WITH MEALS    Uncontrolled type 2 diabetes mellitus with  hyperglycemia, with long-term current use of insulin (H)       multivitamin, therapeutic with minerals Tabs tablet     30 each    Take 1 tablet by mouth daily    Severe major depression with psychotic features (H)       nystatin 465372 UNIT/GM Powd    MYCOSTATIN    60 g    Apply 1 dose topically 3 times daily as needed    Skin rash       omeprazole 20 MG CR capsule    priLOSEC    180 capsule    TAKE 2 CAPSULES BY MOUTH DAILY TAKE 30-60 MINUTES BEFORE A MEAL.    Gastroesophageal reflux disease without esophagitis       order for DME     1 Units    Equipment being ordered: Cane    Knee osteoarthritis       order for DME     3 Month    Equipment being ordered:  Ostomy supplies.  Drain Pouch by [employer] #.    Also needs Barrier by [employer] #.    S/P colostomy (H)       * order for DME     1 Device    by Device route continuous Air walker -  Use as instructed    Ankle injury, left, initial encounter, Abnormal x-ray       * order for DME     1 Device    Equipment being ordered: Knee walker     Closed nondisplaced fracture of fifth metatarsal bone of left foot, initial encounter       PARoxetine 20 MG tablet    PAXIL    30 tablet    TAKE 2 TABLETS AT BEDTIME (Prescribed by Dr. Isael Jiménez at [facility])    Severe recurrent major depressive disorder with psychotic features (H)       pioglitazone 30 MG tablet    ACTOS    90 tablet    Take 1 tablet (30 mg) by mouth daily    Uncontrolled type 2 diabetes mellitus with hyperglycemia, with long-term current use of insulin (H), Insulin resistance       propranolol 60 MG 24 hr capsule    INDERAL LA    60 capsule    Take 1 capsule (60 mg) by mouth See Admin Instructions Take one po q day.  May repeat dose if tremor persists.    Tremor       simvastatin 20 MG tablet    ZOCOR    30 tablet    TAKE 1 TABLET (20 MG) BY MOUTH AT BEDTIME (DUE FOR OFFICE VISIT FOR FUTURE REFILLS)    High cholesterol       traMADol 50 MG tablet    ULTRAM    60 tablet    Take 1  tablet (50 mg) by mouth 2 times daily as needed for severe pain    Arthritis of knee, Arthralgia of both knees       Universal Remover Wipes Misc     1 Each    uses to remove barrier from colostomy bag at time of change    Colostomy       vitamin D 2000 units tablet     100 tablet    Take 2,000 Units by mouth daily    Severe recurrent major depressive disorder with psychotic features (H)       * Notice:  This list has 6 medication(s) that are the same as other medications prescribed for you. Read the directions carefully, and ask your doctor or other care provider to review them with you.

## 2018-08-22 ENCOUNTER — DOCUMENTATION ONLY (OUTPATIENT)
Dept: CARE COORDINATION | Facility: CLINIC | Age: 63
End: 2018-08-22

## 2018-08-22 NOTE — PROGRESS NOTES
[facility] utilizes an encounter to take the place of a direct phone call to your office. Please take a moment to review the below request. Please reply or route message to author of this encounter.  Message will act as a verbal OK of orders requested below. Thank you.    ORDER  Requesting recertification orders for weekly skilled nurse visits.    MD SUMMARY/PLAN OF CARE    MD Summary-Recert  Pt is a 63 yr old female who continues with [facility] for Disease Management/Education and assistance with weekly med set up and assessment of compliance and refills. She currently resides in a single family home with her daughter/son in law and grandchildren. Family assists PRN, client cares for her grandchildren most days. She takes medical transport to all appointments or family will take if she is not able to arrange transport. She continues to attend medical appts as scheduled and updates SN of any medication changes, she has demonstrated improved med compliance with a rare missed dose.  Her MH status has improved, she denies further hallucinations since increased abilify dose.   Pt has ostomy which she manages independently, pt also has implant  which has resulted in her remaining continent of urine. She reports chronic back pain with no changes in regimen the last 60 days.  She had no falls this last cert period.   A/O x 3 with forgetfulness, speech is clear, she is pleasant and cooperative with in home nurse visits.  Pt is following Psych doctor Tino at [facility] and  [facility]. Pt is also seeing a therapist and pt states this has helped with depression. Education on ADA diet and carb counting,  she was seen last week by DM education and has a change in her sliding scale insulin coverage, written instruction left in the home for client and she states understanding.      Due to her cultural food choices it is unlikely client will demonstrate compliance with ADA dietary recommendations, written  instruction and verbal reminders have been provided.  She has been requested to create a food diary and declines to do so.  Client has verbalized awareness of elevated blood sugars due to poor food choices.     BACKGROUND//Primary medical diagnosis is Severe major depression with psychotic features, pt also suffers from occasional memory loss and is Type 2 diabetic.  ANALYSIS// High risk for hospitalization r/t poorly controlled diabetes and history of noncompliance/forgetfulness with taking high risk medications correctly.  She will require skilled interventions to manage her health at home. RECOMMENDATION// Recommend SN visits for Medication management/education, pt/CG education on ADA diet, skin integrity assessments/teaching.  Chronic pain management/education. She will require skilled interventions to manage her health at home. Expected length of home care is  ongoing HC need for med mgmt.  PT eval for balance, fall prevention, safety      Patient/caregiver educated on security, storage and disposal of controlled substances per handout.    Emergency Plan created and reviewed with patient and left in home    Patient Identified Goal// To continue to take all my medications each week.       EVELYN Orellana  [phone]  [email or web address]

## 2018-08-28 NOTE — PROGRESS NOTES
Diabetes Self Management Training: Follow-up Visit    Holly Muir presents today for evaluation of glucose control related to Type 2 diabetes.    She is accompanied by self    Patient's diabetes management related comments/concerns: none    Patient's emotional response to diabetes: expresses readiness to learn    Patient would like this visit to be focused around the following diabetes-related behaviors and goals: Taking Medication    ASSESSMENT:  Patient Problem List and Family Medical History reviewed for relevant medical history, current medical status, and diabetes risk factors.    Current Diabetes Management per Patient:  Taking diabetes medications? Yes: see below. Pt states the following are her current insulin doses: 50 units Basaglar daily, Novolo plus siding scale: add 2 units per 50 > 200.       Diabetes Medication(s)     Biguanides Sig    metFORMIN (GLUCOPHAGE) 1000 MG tablet TAKE 1 TABLET BY MOUTH 2 TIMES DAILY WITH MEALS    Insulin Sig    BASAGLAR 100 UNIT/ML injection Take 55 units daily.    BASAGLAR 100 UNIT/ML injection Inject 55 Units Subcutaneous At Bedtime    insulin aspart (NOVOLOG FLEXPEN) 100 UNIT/ML injection Take as directed with meals. Total daily dose approx 30 units.    Insulin Sensitizing Agents Sig    pioglitazone (ACTOS) 30 MG tablet Take 1 tablet (30 mg) by mouth daily        Do you have any difficulty affording your medications or glucose monitoring supplies?     No     Patient glucose self monitoring as follows: ave of 4 times daily.  BG meter: did not bring meter to appt.   BG results: States fasting bg range is .      BG values are: Not in goal  Patient's most recent   Lab Results   Component Value Date    A1C 10.3 2018    is not meeting goal of <7.0    Nutrition:  Patient eats 3 meals per day. Wakes up 7:30am M-F. Helps get grandchildren off to school. Sometime goes back to bed for a couple of hours. Cooks dinner for family most days of the week.  "    Breakfast - Checks Bg. 11am: coffee with milk and sugar substitute. Oatmeal or cream of wheat or cereal with milk. If eats breakfast will take 6-8 units Humalog. If not eating, does not take correction insulin even if needed.   Lunch - 3pm: Checks Bg. Takes 9-12 units for lunch. Angela and fruit or smoothie.    Dinner -7pm: Checks Bg: takes 9-12 units for dinner. Meat, vegetable, starch.    Snacks -fruit, sometimes nuts or peanut butter.     Beverages: coffee, diet soda, water    Cultural/Scientology diet restrictions: No     Biggest Challenge to Healthy Eating: none    Physical Activity:    No regular exercise program.     Diabetes Risk Factors:  age over 45 years and overweight/obesity    Relevant co-morbidities and related health problems:  Significant for:  kidney disease    Diabetes Complications:  Not discussed today.    Recent health service and resource utilization related to diabetes (hyperglycemia, hypoglycemia, etc):   None    Vitals:  There were no vitals taken for this visit.  Estimated body mass index is 34.02 kg/(m^2) as calculated from the following:    Height as of 12/8/17: 1.6 m (5' 2.99\").    Weight as of 8/8/18: 87.1 kg (192 lb).   Last 3 BP:   BP Readings from Last 3 Encounters:   08/15/18 138/70   08/08/18 90/62   12/31/17 110/60       History   Smoking Status     Former Smoker     Quit date: 8/9/2016   Smokeless Tobacco     Never Used       Labs:  Lab Results   Component Value Date    A1C 10.3 08/08/2018     Lab Results   Component Value Date     08/08/2018     Lab Results   Component Value Date    LDL 21 12/19/2016     HDL Cholesterol   Date Value Ref Range Status   12/19/2016 58 >49 mg/dL Final   ]  GFR Estimate   Date Value Ref Range Status   08/08/2018 80 >60 mL/min/1.7m2 Final     Comment:     Non  GFR Calc     GFR Estimate If Black   Date Value Ref Range Status   08/08/2018 >90 >60 mL/min/1.7m2 Final     Comment:      GFR Calc     Lab Results " "  Component Value Date    CR 0.74 08/08/2018     No results found for: MICROALBUMIN    Socio/Economic/Cultural Considerations:    Support system: family    Cultural Influences/Ethnic Background:  American    Health Literacy/Numeracy:  \"With diabetes, it's helpful to use forms and log books to write down blood sugars and what you're eating at times to help understand how foods affect your blood sugars. With this in mind how confident are you at filling out medical forms, such as these, by yourself?  Not Assessed    Health Beliefs and Attitudes:   Patient Activation Measure Survey Score:  RADHA Score (Last Two) 9/15/2011 1/5/2012   RADHA Raw Score 30 35   Activation Score 37.3 45.2   RADHA Level 1 1     Stage of Change: CONTEMPLATION (Considering change and yet undecided)    Diabetes knowledge and skills assessment:     Patient is knowledgeable in diabetes management concepts related to: Monitoring and Taking Medication    Patient needs further education on the following diabetes management concepts: Healthy Eating and Problem Solving    Barriers to Learning Assessment: Cognitive impairment - pt takes anti-psychotic meds. Has challenges absorbing information and is forgetful.     Based on learning assessment above, most appropriate setting for further diabetes education would be: Individual setting.    INTERVENTION:   Education provided today on:  Taking Medication: Reviewed basal and bolus insulin doses. Pt forgot she was to increase Basaglar dose form 50 to 55  units per pcp chart note. Reminded of this today.     Opportunities for ongoing education and support in diabetes-self management were discussed.    Pt verbalized understanding of concepts discussed and recommendations provided today.       Education Materials Provided:  No new materials provided today    PLAN:  Taking Medication: Increase Basaglar dose to 55 units daily.   Keep meal doses at 6/9/9. Reminded pt of importance of taking correction insulin as well. " Reviewed Sl[place]ign Scale: 1 unit per 50 > 250. Pt advised to take 2 units Humalog when she wakes up even if not eating breakfast because milk has carbs in it and she drinks up to 2 cups of coffee with milk every morning.     FOLLOW-UP:  Keep f/u appt with Endo.     Ongoing plan for education and support: as needed.     Time Spent: 60 minutes  Encounter Type: Individual    Any diabetes medication dose changes were made via the CDE Protocol and Collaborative Practice Agreement with the patient's endocrinology provider. A copy of this encounter was shared with the provider.    Holly Muir comes into clinic today at the request of Dr Medrano, Ordering Provider for Pt Teaching and bg review.     This service provided today was under the supervising provider of the day Dr Medina, who was available if needed.    Mikayla Sellers, RN, BSN, CDE   [facility]

## 2018-08-30 DIAGNOSIS — Z53.9 DIAGNOSIS NOT YET DEFINED: Primary | ICD-10-CM

## 2018-08-30 PROCEDURE — G0179 MD RECERTIFICATION HHA PT: HCPCS | Performed by: INTERNAL MEDICINE

## 2018-09-05 ENCOUNTER — TELEPHONE (OUTPATIENT)
Dept: ENDOCRINOLOGY | Facility: CLINIC | Age: 63
End: 2018-09-05

## 2018-09-05 NOTE — TELEPHONE ENCOUNTER
Meredith with [facility] left message on clinic voicemail regarding morning BG of 412 that was corrected with insulin. It is now 320. She thinks this may be due to dietary changes that were made yesterday. Stated that overall blood sugars have improved. Meredith is requesting a call back [phone] or directly to patient [phone] with recommendations.    Will forward to LEORA Saldana to review.    Argelia Maciel LPN  Adult Endocrinology  [facility]

## 2018-09-05 NOTE — TELEPHONE ENCOUNTER
Spoke with home health nurse, Meredith. She and I are familiar with this patient and have spoken on the phone before to collaborate care. Advised I met with the patient last week and for now do not want to make insulin dose changes out of concern for a hypoglycemia event. Meredith verbalized understanding. I thanked her for calling the clinic with this update.     Mikayla Sellers RN, BSN, CDE   [facility]

## 2018-09-06 ENCOUNTER — DOCUMENTATION ONLY (OUTPATIENT)
Dept: ENDOCRINOLOGY | Facility: CLINIC | Age: 63
End: 2018-09-06

## 2018-09-06 NOTE — PROGRESS NOTES
Chart notes faxed to [facility] [phone] per Medicare for softclix lancets.    Argelia Maciel LPN  Adult Endocrinology  [facility]

## 2018-09-20 DIAGNOSIS — M17.10 ARTHRITIS OF KNEE: ICD-10-CM

## 2018-09-20 DIAGNOSIS — M25.561 ARTHRALGIA OF BOTH KNEES: ICD-10-CM

## 2018-09-20 DIAGNOSIS — M25.562 ARTHRALGIA OF BOTH KNEES: ICD-10-CM

## 2018-09-20 RX ORDER — TRAMADOL HYDROCHLORIDE 50 MG/1
TABLET ORAL
Qty: 60 TABLET | Refills: 0 | OUTPATIENT
Start: 2018-09-20

## 2018-09-20 NOTE — TELEPHONE ENCOUNTER
Review of medication list indicates that patient has refills available from the original prescription.    Medication order:       Disp Refills Start End KARLA     traMADol (ULTRAM) 50 MG tablet 60 tablet 2 8/8/2018  No     Sig - Route: Take 1 tablet (50 mg) by mouth 2 times daily as needed for severe pain - Oral     Pharmacy      [id number] - [place], [place] - [address] [facility]. AT [facility]           Patient has requested refill too soon.    Zoraida Min RN,   [facility], [facility]

## 2018-10-02 ENCOUNTER — MEDICAL CORRESPONDENCE (OUTPATIENT)
Dept: HEALTH INFORMATION MANAGEMENT | Facility: CLINIC | Age: 63
End: 2018-10-02

## 2018-10-09 ENCOUNTER — MEDICAL CORRESPONDENCE (OUTPATIENT)
Dept: HEALTH INFORMATION MANAGEMENT | Facility: CLINIC | Age: 63
End: 2018-10-09

## 2018-10-17 ENCOUNTER — DOCUMENTATION ONLY (OUTPATIENT)
Dept: CARE COORDINATION | Facility: CLINIC | Age: 63
End: 2018-10-17

## 2018-10-17 NOTE — PROGRESS NOTES
[facility] utilizes an encounter to take the place of a direct phone call to your office. Please take a moment to review the below request. Please reply or route message to author of this encounter.  Message will act as a verbal OK of orders requested below. Thank you.    ORDER  Requesting continued weekly SN visits    MD SUMMARY/PLAN OF CARE    MD Summary-Michelle  Pt is a 63 yr old female who continues with [facility] for Disease Management/Education and assistance with weekly med set up and assessment of compliance and refills.  . She takes medical transport to all appointments or family will take if she is not able to arrange transport. She continues to attend medical appts as scheduled and updates SN of any medication changes, she has demonstrated improved med compliance with a rare missed dose.  Her MH status has improved, she denies further hallucinations since increased abilify dose.   Pt has ostomy which she manages independently, pt also has implant  which has resulted in her remaining continent of urine. She reports chronic back pain with no changes in regimen the last 60 days.  She had no falls this last cert period.   A/O x 3 with forgetfulness, speech is clear, she is pleasant and cooperative with in home nurse visits.  Pt is following Psych doctor Tino at [facility] and  [facility]. Pt is also seeing a therapist and pt states this has helped with depression. Education on ADA diet and carb counting,  she follows up with DM education as directed.  Client has most recently been able to state a correlation with her high blood sugar readings and her oral intake.     BACKGROUND//Primary medical diagnosis is Severe major depression with psychotic features, pt also suffers from occasional memory loss and is Type 2 diabetic.  ANALYSIS// High risk for hospitalization r/t poorly controlled diabetes and history of noncompliance/forgetfulness with taking high risk medications correctly.  She  will require skilled interventions to manage her health at home. RECOMMENDATION// Recommend SN visits for Medication management/education, pt/CG education on ADA diet, skin integrity assessments/teaching.  Chronic pain management/education. She will require skilled interventions to manage her health at home. Expected length of home care is  ongoing HC need for med mgmt.  PT eval for balance, fall prevention, safety    Patient/caregiver educated on security, storage and disposal of controlled substances per handout.    Emergency Plan created and reviewed with patient and left in home    Patient Identified Goal// to have my hallucinations stay away by taking my medicine and keep my blood sugars good     EVELYN Orellana  [phone]  [email or web address]

## 2018-10-19 DIAGNOSIS — Z53.9 DIAGNOSIS NOT YET DEFINED: Primary | ICD-10-CM

## 2018-10-19 PROCEDURE — G0179 MD RECERTIFICATION HHA PT: HCPCS | Performed by: INTERNAL MEDICINE

## 2018-10-24 ENCOUNTER — TELEPHONE (OUTPATIENT)
Dept: ENDOCRINOLOGY | Facility: CLINIC | Age: 63
End: 2018-10-24

## 2018-10-24 NOTE — TELEPHONE ENCOUNTER
Meredith with [facility] calling because the patient missed her appointment with Mikayla Sellers last week because her Taxi did not show up. Patient is now scheduled to see Mikayla Sellers 11/14/18 @ 11am.    Meredith states the patients blood sugars are all over the board. Today before lunch the patients blood sugar was 357 and just had the patient recheck at 2:35pm - is currently at 195.     Meredith asks for Mikayla to call the patient back but if Mikayla has any information or directions that would help Meredith, Mikayla can call her too.     Yajaira Rajan, [facility]  Adult Endocrinology  [facility]

## 2018-10-25 RX ORDER — INSULIN GLARGINE 100 [IU]/ML
INJECTION, SOLUTION SUBCUTANEOUS
Qty: 60 ML | Refills: 0 | Status: SHIPPED | OUTPATIENT
Start: 2018-10-25 | End: 2019-09-20

## 2018-10-25 NOTE — TELEPHONE ENCOUNTER
Per 18 CDE Appt:    Current Diabetes Management per Patient:  Taking diabetes medications? Yes: see below. Pt states the following are her current insulin doses: 50 units Basaglar daily, Novolo plus siding scale: add 2 units per 50 > 200.     Follow up 18.    Refill approved.    Argelia Maciel LPN  Clinic Coordinator   Adult Endocrinology and Pediatric Speciality Clinics  [facility]

## 2018-11-09 ENCOUNTER — TRANSFERRED RECORDS (OUTPATIENT)
Dept: HEALTH INFORMATION MANAGEMENT | Facility: CLINIC | Age: 63
End: 2018-11-09

## 2018-11-12 VITALS
TEMPERATURE: 97 F | SYSTOLIC BLOOD PRESSURE: 90 MMHG | DIASTOLIC BLOOD PRESSURE: 62 MMHG | HEIGHT: 62 IN | OXYGEN SATURATION: 100 % | WEIGHT: 192 LBS | BODY MASS INDEX: 35.33 KG/M2 | HEART RATE: 71 BPM

## 2018-11-14 ENCOUNTER — ALLIED HEALTH/NURSE VISIT (OUTPATIENT)
Dept: EDUCATION SERVICES | Facility: CLINIC | Age: 63
End: 2018-11-14
Payer: MEDICARE

## 2018-11-14 ENCOUNTER — OFFICE VISIT (OUTPATIENT)
Dept: PEDIATRICS | Facility: CLINIC | Age: 63
End: 2018-11-14
Payer: MEDICARE

## 2018-11-14 VITALS
TEMPERATURE: 96.6 F | DIASTOLIC BLOOD PRESSURE: 68 MMHG | SYSTOLIC BLOOD PRESSURE: 131 MMHG | BODY MASS INDEX: 36.21 KG/M2 | WEIGHT: 198 LBS | OXYGEN SATURATION: 98 % | HEART RATE: 73 BPM

## 2018-11-14 DIAGNOSIS — E11.22 TYPE 2 DIABETES MELLITUS WITH DIABETIC CHRONIC KIDNEY DISEASE (H): Primary | ICD-10-CM

## 2018-11-14 DIAGNOSIS — Z23 NEED FOR PROPHYLACTIC VACCINATION AND INOCULATION AGAINST INFLUENZA: ICD-10-CM

## 2018-11-14 DIAGNOSIS — E66.01 MORBID OBESITY (H): ICD-10-CM

## 2018-11-14 DIAGNOSIS — Z23 NEEDS FLU SHOT: ICD-10-CM

## 2018-11-14 DIAGNOSIS — Z12.11 SPECIAL SCREENING FOR MALIGNANT NEOPLASMS, COLON: ICD-10-CM

## 2018-11-14 DIAGNOSIS — E78.00 HIGH CHOLESTEROL: ICD-10-CM

## 2018-11-14 DIAGNOSIS — I10 HYPERTENSION GOAL BP (BLOOD PRESSURE) < 140/90: Primary | ICD-10-CM

## 2018-11-14 DIAGNOSIS — E11.65 UNCONTROLLED TYPE 2 DIABETES MELLITUS WITH HYPERGLYCEMIA (H): ICD-10-CM

## 2018-11-14 DIAGNOSIS — R29.6 RECURRENT FALLS: ICD-10-CM

## 2018-11-14 DIAGNOSIS — B07.9 VIRAL WART ON FINGER: ICD-10-CM

## 2018-11-14 PROCEDURE — 99207 ZZC DROP WITH A PROCEDURE: CPT

## 2018-11-14 PROCEDURE — 99215 OFFICE O/P EST HI 40 MIN: CPT | Mod: 25 | Performed by: INTERNAL MEDICINE

## 2018-11-14 PROCEDURE — G0108 DIAB MANAGE TRN  PER INDIV: HCPCS

## 2018-11-14 PROCEDURE — G0008 ADMIN INFLUENZA VIRUS VAC: HCPCS | Performed by: INTERNAL MEDICINE

## 2018-11-14 PROCEDURE — 17110 DESTRUCTION B9 LES UP TO 14: CPT | Performed by: INTERNAL MEDICINE

## 2018-11-14 PROCEDURE — 90682 RIV4 VACC RECOMBINANT DNA IM: CPT | Performed by: INTERNAL MEDICINE

## 2018-11-14 RX ORDER — ENALAPRIL MALEATE 2.5 MG/1
2.5 TABLET ORAL DAILY
Qty: 90 TABLET | Refills: 3 | Status: SHIPPED | OUTPATIENT
Start: 2018-11-14 | End: 2019-01-31

## 2018-11-14 ASSESSMENT — ANXIETY QUESTIONNAIRES
1. FEELING NERVOUS, ANXIOUS, OR ON EDGE: SEVERAL DAYS
3. WORRYING TOO MUCH ABOUT DIFFERENT THINGS: MORE THAN HALF THE DAYS
6. BECOMING EASILY ANNOYED OR IRRITABLE: SEVERAL DAYS
GAD7 TOTAL SCORE: 8
2. NOT BEING ABLE TO STOP OR CONTROL WORRYING: SEVERAL DAYS
7. FEELING AFRAID AS IF SOMETHING AWFUL MIGHT HAPPEN: SEVERAL DAYS
5. BEING SO RESTLESS THAT IT IS HARD TO SIT STILL: SEVERAL DAYS

## 2018-11-14 ASSESSMENT — PATIENT HEALTH QUESTIONNAIRE - PHQ9: 5. POOR APPETITE OR OVEREATING: SEVERAL DAYS

## 2018-11-14 NOTE — PROGRESS NOTES

## 2018-11-14 NOTE — PROGRESS NOTES
SUBJECTIVE:   Holly Muir is a 63 year old female who presents to clinic today for the following health issues:      Medication Followup of all meds    Taking Medication as prescribed: yes    Side Effects:  None    Medication Helping Symptoms:  yes     Chronic insomnia. Getting a little worse lately. Follows with psychiatry.     Wart on the right thumb, left ring finger.     She lives with her daughter in a house. She stays at the baseline and the kitchen is at the main level. There are no railings at the stairs going up to the main level. She has fallen in the past trying to get up stairs.     She has her own bathroom and shower but there is no grab bars. She has a shower bench. She has fallen trying to get in and out of the tub/shower multiple times before.     ROS:  Constitutional, HEENT, cardiovascular, pulmonary, gi and gu systems are negative, except as otherwise noted.         Current Outpatient Prescriptions on File Prior to Visit:  ARIPiprazole (ABILIFY) 20 MG tablet Take 1 tablet (20 mg) by mouth daily Along with 5mg tab to total 25mg.  Prescribed by Dr. Isael Jiménez at [facility]   BASAGLAR 100 UNIT/ML injection Take 55 units daily.   buPROPion (WELLBUTRIN XL) 150 MG 24 hr tablet TAKE 3 TABLETS (450 MG) BY MOUTH EVERY MORNING   Cholecalciferol (VITAMIN D) 2000 units tablet Take 2,000 Units by mouth daily   gabapentin (NEURONTIN) 300 MG capsule TAKE ONE CAPSULE BY MOUTH TWICE A DAY TAKE 2 CAPSULES BY MOUTH IN THE EVENING   insulin aspart (NOVOLOG FLEXPEN) 100 UNIT/ML injection Take as directed with meals. Total daily dose approx 30 units.   metFORMIN (GLUCOPHAGE) 1000 MG tablet TAKE 1 TABLET BY MOUTH TWICE A DAY WITH MEALS   multivitamin, therapeutic with minerals (MULTI-VITAMIN) TABS tablet Take 1 tablet by mouth daily   nystatin (MYCOSTATIN) 579631 UNIT/GM POWD Apply 1 dose topically 3 times daily as needed   omeprazole (PRILOSEC) 20 MG CR capsule TAKE 2 CAPSULES BY MOUTH DAILY TAKE  30-60 MINUTES BEFORE A MEAL.   PARoxetine (PAXIL) 20 MG tablet TAKE 2 TABLETS AT BEDTIME (Prescribed by Dr. Isael Jiménez at [facility])   pioglitazone (ACTOS) 30 MG tablet Take 1 tablet (30 mg) by mouth daily   propranolol (INDERAL LA) 60 MG 24 hr capsule Take 1 capsule (60 mg) by mouth See Admin Instructions Take one po q day.  May repeat dose if tremor persists.   simvastatin (ZOCOR) 20 MG tablet TAKE 1 TABLET (20 MG) BY MOUTH AT BEDTIME (DUE FOR OFFICE VISIT FOR FUTURE REFILLS)   ARIPiprazole (ABILIFY) 5 MG tablet Take 1 tablet (5 mg) by mouth At Bedtime Along with 5mg tab to total 25mg.  Prescribed by Dr. Isael Jiménez at [facility]   blood glucose monitoring (ACCU-CHEK CELIA PLUS) meter device kit Use to test blood sugars 4 times daily or as directed.   blood glucose monitoring (ACCU-CHEK CELIA) test strip Use to test blood sugars 6 times daily or as directed.   blood glucose monitoring (ACCU-CHEK FASTCLIX) lancets Use to test blood sugar 6 times daily or as directed.   ibuprofen (ADVIL/MOTRIN) 800 MG tablet Take 1 tablet (800 mg) by mouth every 8 hours as needed for moderate pain (with food)   insulin pen needle 31G X 5 MM Use four times 4 day times a day   order for DME Equipment being ordered: Knee walker    order for DME by Device route continuous Air walker -  Use as instructed   ORDER FOR DME Equipment being ordered:  Ostomy supplies.  Drain Pouch by [employer] #.    Also needs Barrier by [place] #.   ORDER FOR DME Equipment being ordered: Cane   UNIVERSAL REMOVER WIPES EX MISC uses to remove barrier from colostomy bag at time of change     No current facility-administered medications on file prior to visit.        Patient Active Problem List   Diagnosis     Hyperlipidemia LDL goal <100     Severe major depression with psychotic features (H)     History of cervical cancer     S/P colostomy (H)     Mixed incontinence urge and stress     Memory loss     Type 2 diabetes  mellitus, uncontrolled A1C goal <8%     Obesity     Pain in joint, hand     Trigger finger, acquired     Synovitis and tenosynovitis     Dermatochalasis     Presbyopia     OCD (obsessive compulsive disorder)     HTN, goal below 140/90     Cataracts, both eyes     Health Care Home     Lumbago     Sprain of lumbar region     Generalized osteoarthrosis, unspecified site     Rotator cuff impingement syndrome     Hypoglycemia due to insulin     Encounter for long-term (current) use of insulin (H)     Colostomy, evaluate (H)     Urinary retention     ACP (advance care planning)     Bilateral low back pain without sciatica     Anxiety     Fecal incontinence due to anorectal disorder     Visual hallucination     Type 2 diabetes mellitus with hyperglycemia (H)     Chronic pain syndrome     Obesity (BMI 35.0-39.9) with comorbidity (H)     Past Surgical History:   Procedure Laterality Date     ARTHROSCOPY KNEE RT/LT      LT     BREAST LUMPECTOMY, RT/LT      LT-benign      C EXCIS PTERYGIUM  10/08    [facility]     C STOMACH SURGERY PROCEDURE UNLISTED       COLONOSCOPY  5/10/2012    Procedure:COLONOSCOPY; screening colonoscopy; Surgeon:MILLA VEGA; Location:[facility] OR     COLOSTOMY  2000     COLOSTOMY       [facility] COLONOSCOPY THRU STOMA, DIAGNOSTIC  2002    normal per report-no records available-records destroyed     HYSTERECTOMY, PAP NO LONGER INDICATED      done in [place]-cervical cancer     IMPLANT STIMULATOR SACRAL NERVE STAGE ONE Right 3/10/2015    Procedure: IMPLANT STIMULATOR SACRAL NERVE STAGE ONE;  Surgeon: Teodora Yusuf MD;  Location: [facility] OR     IMPLANT STIMULATOR SACRAL NERVE STAGE TWO Right 3/31/2015    Procedure: IMPLANT STIMULATOR SACRAL NERVE STAGE TWO;  Surgeon: Teodora Yusuf MD;  Location: [facility] OR     KNEE SURGERY       SALPINGO OOPHORECTOMY,R/L/JENNIFER      Salpingo Oophorectomy, RT/LT/JENNIFER       Social History   Substance Use Topics     Smoking status: Former Smoker     Quit date: 8/9/2016     Smokeless  tobacco: Never Used     Alcohol use 0.0 oz/week     0 Standard drinks or equivalent per week      Comment: social occasions     Family History   Problem Relation Age of Onset     Diabetes Mother      Cerebrovascular Disease Mother      Diabetes Father      Hypertension Father      Cancer Maternal Grandfather      Cancer Paternal Grandfather      Diabetes Brother      Diabetes Sister      Thyroid Disease Daughter      Thyroid Disease Sister      Multiple Sclerosis Daughter      Glaucoma No family hx of      Macular Degeneration No family hx of              Problem list, Medication list, Allergies, and Medical/Social/Surgical histories reviewed in [facility] and updated as appropriate.    OBJECTIVE:                                                    /68  Pulse 73  Temp 96.6  F (35.9  C) (Temporal)  Wt 198 lb (89.8 kg)  SpO2 98%  BMI 36.21 kg/m2    GENERAL: healthy, alert and no distress  HEENT: unremarkable  Neck: no adenopathy/mass/stiffness. Thyroid normal.  Lung: clear, no wheezing/rhonchi/crackles  Heart: RRR, normal S1/2, no murmur/gallop/rup  Abd: soft, normal BS, non tender, no organomegaly   Ext: no cyanosis/clubbing/edema      Diagnostic test results:        ASSESSMENT/PLAN:                                                      63 year old female with the following diagnoses and treatment plan:      ICD-10-CM    1. Hypertension goal BP (blood pressure) < 140/90 I10 enalapril (VASOTEC) 2.5 MG tablet   2. Needs flu shot Z23 FLU VAC, QUADRIVALENT (RIV4) RECOMBINANT DNA, IM     ADMIN 1st VACCINE   3. Morbid obesity (H) E66.01    4. Recurrent falls R29.6 order for DME     order for DME   5. High cholesterol E78.00    6. Special screening for malignant neoplasms, colon Z12.11 Fecal colorectal cancer screen (FIT)   7. Uncontrolled type 2 diabetes mellitus with hyperglycemia (H) E11.65 OPTOMETRY REFERRAL   8. Need for prophylactic vaccination and inoculation against influenza Z23    9. Viral wart on finger B07.9  DESTRUCT BENIGN LESION, UP TO 14     DERMATOLOGY REFERRAL       -- wart on the left index finger: treated with liquid nitrogen freezing freeze and thaw x 3 for the ring finger but I did not  Freeze the one on the thumb by the nail fold. Will have her see dermatology for this one.   -- HTN: controlled. Refilled enalapril.   --History of recurrent falls.  It is reasonable to have hand rails by the stairs and grab bars by the shower.  --History of uncontrolled diabetes, followed by endocrine.  She is due for eye exam.  Referral made for optometry.    Will call or return to clinic if worsening or symptoms not improving as discussed.  See Patient Instructions.    A total of 40 minutes was spent face to face with this patient. More than 50% of the time was spent on education for the above problems and management plans.     Tiffany King MD-PhD  [facility]    (Note: Chart documentation was done in part with Dragon Voice Recognition software. Although reviewed after completion, some word and grammatical errors may remain.)

## 2018-11-14 NOTE — MR AVS SNAPSHOT
After Visit Summary   11/14/2018    Holly Muir    MRN: [id number]           Patient Information     Date Of Birth          1955        Visit Information        Provider Department      11/14/2018 1:10 PM Tiffany King MD PhD [facility]        Today's Diagnoses     Hypertension goal BP (blood pressure) < 140/90    -  1    Needs flu shot        Morbid obesity (H)        Recurrent falls        High cholesterol        Special screening for malignant neoplasms, colon        Uncontrolled type 2 diabetes mellitus with hyperglycemia (H)        Need for prophylactic vaccination and inoculation against influenza        Viral wart on finger          Care Instructions    Make appointment(s) for:   -- wellness visit with fasting lab in 3 - 4 weeks.  -- optometry  -- Stop by lab to get testing kit for colon cancer screening.       Wart freezing:  The treated area is likely to blister within a few hours. Sometimes the blister is clear and sometimes it is red or purple because of bleeding (this is harmless). Treatment near the eye may result in a puffy eyelid, especially the following morning, but the swelling settles within a few days. Within a few days a scab forms and the blister gradually dries up.   Usually no special attention is needed during the healing phase. The treated area may be gently washed once or twice daily, and should be kept clean. A dressing is optional, but is advisable if the affected area is subject to trauma or clothes rub on it.   When the blister dries to a scab, apply petroleum jelly (Vaseline) and avoid picking at it. The scab peels off after 5-10 days on the face and 3 weeks on the hand. A sore or scab may persist as long as 3 months on the lower leg because healing in this site is often slow.   Secondary infection is uncommon. When it occurs it may cause increased pain, swelling, thick yellow blister fluid, a purulent discharge and/or redness around the treated  area. Consult your doctor if you are concerned: topical antiseptics and/or oral antibiotics may be necessary.           Medication(s) prescribed today:    Orders Placed This Encounter   Medications     enalapril (VASOTEC) 2.5 MG tablet     Sig: Take 1 tablet (2.5 mg) by mouth daily     Dispense:  90 tablet     Refill:  3         Schedule diabetic eye exam          Follow-ups after your visit        Additional Services     DERMATOLOGY REFERRAL       Your provider has referred you to: [facility] [facility] [phone]   [email or web address]    Reason for referral: wart involving nail fold of the left ring finger.     Please be aware that coverage of these services is subject to the terms and limitations of your health insurance plan.  Call member services at your health plan with any benefit or coverage questions.      Please bring the following with you to your appointment:    (1) Any X-Rays, CTs or MRIs which have been performed.  Contact the facility where they were done to arrange for  prior to your scheduled appointment.    (2) List of current medications  (3) This referral request   (4) Any documents/labs given to you for this referral            [place]OMETRY REFERRAL       [place]  [facility] [place] [facility] [phone]    [email or web address]      Please be aware that coverage of these services is subject to the terms and limitations of your health insurance plan.  Call member services at your health plan with any benefit or coverage questions.      Please bring the following to your appointment:  >>   Any x-rays, CTs or MRIs which have been performed.  Contact the facility where they were done to arrange for  prior to your scheduled appointment.  Any new CT, MRI or other procedures ordered by your specialist must be performed at a [facility]  facility or coordinated by your clinic's referral office.    >>   List of current medications   >>   This referral request   >>   Any documents/labs given to you for this referral                  Your next 10 appointments already scheduled     Nov 14, 2018  1:10 PM CST   [facility] MD [facility]    [address] [place] [id number] [phone]            Dec 21, 2018  2:15 PM CST   Return Visit with Elizbaeth Medrano MD   [facility]    [address] [address] [address] [phone]              Future tests that were ordered for you today     Open Future Orders        Priority Expected Expires Ordered    Fecal colorectal cancer screen (FIT) Routine 12/5/2018 2/6/2019 11/14/2018            Who to contact     If you have questions or need follow up information about today's clinic visit or your schedule please contact [facility] directly at [phone].  Normal or non-critical lab and imaging results will be communicated to you by MyChart, letter or phone within 4 business days after the clinic has received the results. If you do not hear from us within 7 days, please contact the clinic through MyChart or phone. If you have a critical or abnormal lab result, we will notify you by phone as soon as possible.  Submit refill requests through [employer] or call your pharmacy and they will forward the refill request to us. Please allow 3 business days for your refill to be completed.          Additional Information About Your Visit        Care EveryWhere ID     This is your Care EveryWhere ID. This could be used by other organizations to access your [place] medical records  [id number]        Your Vitals Were     Pulse Temperature Pulse Oximetry BMI (Body Mass Index)          73 96.6  F (35.9  C) (Temporal) 98% 36.21 kg/m2         Blood Pressure from Last 3  Encounters:   11/14/18 131/68   08/15/18 138/70   08/08/18 90/62    Weight from Last 3 Encounters:   11/14/18 198 lb (89.8 kg)   08/08/18 192 lb (87.1 kg)   02/15/18 200 lb (90.7 kg)              We Performed the Following     ADMIN 1st VACCINE     DEPRESSION ACTION PLAN (DAP)     DERMATOLOGY REFERRAL     DESTRUCT BENIGN LESION, UP TO 14     FLU VAC, QUADRIVALENT (RIV4) RECOMBINANT DNA, IM     OPTOMETRY REFERRAL          Today's Medication Changes          These changes are accurate as of 11/14/18 12:54 PM.  If you have any questions, ask your nurse or doctor.               Start taking these medicines.        Dose/Directions    order for DME   Used for:  Recurrent falls   Started by:  Tiffany King MD PhD        Equipment being ordered: grab bar for bath tub/shower.   Quantity:  1 Units   Refills:  0       order for DME   Used for:  Recurrent falls   Started by:  Tiffany King MD PhD        Equipment being ordered: railing for stairs at home.   Quantity:  1 Units   Refills:  0         These medicines have changed or have updated prescriptions.        Dose/Directions    enalapril 2.5 MG tablet   Commonly known as:  VASOTEC   This may have changed:  See the new instructions.   Used for:  Hypertension goal BP (blood pressure) < 140/90   Changed by:  Tiffany King MD PhD        Dose:  2.5 mg   Take 1 tablet (2.5 mg) by mouth daily   Quantity:  90 tablet   Refills:  3            Where to get your medicines      These medications were sent to [id number] - [place] - [address] AT [facility]  [address] [place] [address]     Phone:  [phone]     enalapril 2.5 MG tablet         Some of these will need a paper prescription and others can be bought over the counter.  Ask your nurse if you have questions.     Bring a paper prescription for each of these medications     order for DME    order for DME                Primary Care Provider Office Phone # Fax #    Tiffany King MD PhD  [phone]       [address]  [facility] [address]        Goals        General    I will continue to monitor my blood sugars and treat the results as directed. (pt-stated)     Notes - Note created  12/9/2015 10:26 AM by Randy Suarez RN    As of today's date 12/9/2015 goal is met at 0 - 25%.   Goal Status:  Active          Equal Access to Services     [facility] CampusCHANELLE : Hadii aad ku hadasho Soomaali, waaxda luqadaha, qaybta kaalmada adeegyada, waxay idiin hayaan adeeg khanastacio laPatiaan . So [facility] [phone].    ATENCIÓN: Si habla español, tiene a lpoez disposición servicios gratuitos de asistencia lingüística. Llame al [phone].    We comply with applicable federal civil rights laws and [place] laws. We do not discriminate on the basis of race, color, national origin, age, disability, sex, sexual orientation, or gender identity.            Thank you!     Thank you for choosing [facility]  for your care. Our goal is always to provide you with excellent care. Hearing back from our patients is one way we can continue to improve our services. Please take a few minutes to complete the written survey that you may receive in the mail after your visit with us. Thank you!             Your Updated Medication List - Protect others around you: Learn how to safely use, store and throw away your medicines at www.disposemymeds.org.          This list is accurate as of 11/14/18 12:54 PM.  Always use your most recent med list.                   Brand Name Dispense Instructions for use Diagnosis    * ARIPiprazole 20 MG tablet    ABILIFY     Take 1 tablet (20 mg) by mouth daily Along with 5mg tab to total 25mg.  Prescribed by Dr. Isael Jim[facility] at [facility]    Visual hallucination, Severe recurrent major depressive disorder with psychotic features (H)       * ARIPiprazole 5 MG tablet    ABILIFY    30 tablet    Take 1 tablet (5 mg) by mouth At Bedtime Along with 5mg tab to total 25mg.   Prescribed by Dr. Isael Jiménez at [facility]    Severe recurrent major depressive disorder with psychotic features (H)       BASAGLAR 100 UNIT/ML injection     60 mL    Take 55 units daily.    Type 2 diabetes mellitus, uncontrolled (H)       blood glucose monitoring lancets     510 each    Use to test blood sugar 6 times daily or as directed.    Type 2 diabetes mellitus, uncontrolled (H)       blood glucose monitoring meter device kit     1 kit    Use to test blood sugars 4 times daily or as directed.    Uncontrolled type 2 diabetes mellitus with hyperglycemia, with long-term current use of insulin (H)       blood glucose monitoring test strip    ACCU-CHEK CELIA    600 each    Use to test blood sugars 6 times daily or as directed.    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)       BusPIRone HCl 30 MG Tabs     180 tablet    TAKE 1 TABLET BY MOUTH TWICE A DAY    Anxiety       [facility] ASPIRIN ADULT LOW DOSE 81 MG chewable tablet   Generic drug:  aspirin     90 tablet    TAKE 1 TABLET (81 MG) BY MOUTH DAILY ***OTC- NOT COVERED BY INS***    Uncontrolled diabetes mellitus (H)       enalapril 2.5 MG tablet    VASOTEC    90 tablet    Take 1 tablet (2.5 mg) by mouth daily    Hypertension goal BP (blood pressure) < 140/90       gabapentin 300 MG capsule    NEURONTIN    360 capsule    TAKE ONE CAPSULE BY MOUTH TWICE A DAY TAKE 2 CAPSULES BY MOUTH IN THE EVENING    Diabetic polyneuropathy associated with type 2 diabetes mellitus (H)       ibuprofen 800 MG tablet    ADVIL/MOTRIN    30 tablet    Take 1 tablet (800 mg) by mouth every 8 hours as needed for moderate pain (with food)    Closed fracture of fifth metatarsal bone of left foot, physeal involvement unspecified, initial encounter       insulin aspart 100 UNIT/ML  injection    NovoLOG FLEXPEN    30 mL    Take as directed with meals. Total daily dose approx 30 units.    Type 2 diabetes mellitus, uncontrolled, with retinopathy (H)       insulin pen needle 31G X 5 MM     400 each    Use four times 4 day times a day    Type 2 diabetes mellitus, uncontrolled (H)       metFORMIN 1000 MG tablet    GLUCOPHAGE    180 tablet    TAKE 1 TABLET BY MOUTH TWICE A DAY WITH MEALS    Uncontrolled type 2 diabetes mellitus with hyperglycemia, with long-term current use of insulin (H)       multivitamin, therapeutic with minerals Tabs tablet     30 each    Take 1 tablet by mouth daily    Severe major depression with psychotic features (H)       nystatin 750401 UNIT/GM Powd    MYCOSTATIN    60 g    Apply 1 dose topically 3 times daily as needed    Skin rash       omeprazole 20 MG CR capsule    priLOSEC    180 capsule    TAKE 2 CAPSULES BY MOUTH DAILY TAKE 30-60 MINUTES BEFORE A MEAL.    Gastroesophageal reflux disease without esophagitis       order for DME     1 Units    Equipment being ordered: Cane    Knee osteoarthritis       order for DME     3 Month    Equipment being ordered:  Ostomy supplies.  Drain Pouch by [employer] #.    Also needs Barrier by [employer] #.    S/P colostomy (H)       * order for DME     1 Device    by Device route continuous Air walker -  Use as instructed    Ankle injury, left, initial encounter, Abnormal x-ray       * order for DME     1 Device    Equipment being ordered: Knee walker     Closed nondisplaced fracture of fifth metatarsal bone of left foot, initial encounter       order for DME     1 Units    Equipment being ordered: grab bar for bath tub/shower.    Recurrent falls       order for DME     1 Units    Equipment being ordered: railing for stairs at home.    Recurrent falls       PARoxetine 20 MG tablet    PAXIL    30 tablet    TAKE 2 TABLETS AT BEDTIME (Prescribed by Dr. Isael Jiménez at [facility])    Severe recurrent major  depressive disorder with psychotic features (H)       pioglitazone 30 MG tablet    ACTOS    90 tablet    Take 1 tablet (30 mg) by mouth daily    Uncontrolled type 2 diabetes mellitus with hyperglycemia, with long-term current use of insulin (H), Insulin resistance       propranolol 60 MG 24 hr capsule    INDERAL LA    60 capsule    Take 1 capsule (60 mg) by mouth See Admin Instructions Take one po q day.  May repeat dose if tremor persists.    Tremor       simvastatin 20 MG tablet    ZOCOR    30 tablet    TAKE 1 TABLET (20 MG) BY MOUTH AT BEDTIME (DUE FOR OFFICE VISIT FOR FUTURE REFILLS)    High cholesterol       traMADol 50 MG tablet    ULTRAM    60 tablet    Take 1 tablet (50 mg) by mouth 2 times daily as needed for severe pain    Arthritis of knee, Arthralgia of both knees       Universal Remover Wipes Misc     1 Each    uses to remove barrier from colostomy bag at time of change    Colostomy       vitamin D 2000 units tablet     100 tablet    Take 2,000 Units by mouth daily    Severe recurrent major depressive disorder with psychotic features (H)       * Notice:  This list has 4 medication(s) that are the same as other medications prescribed for you. Read the directions carefully, and ask your doctor or other care provider to review them with you.

## 2018-11-14 NOTE — PATIENT INSTRUCTIONS
Make appointment(s) for:   -- wellness visit with fasting lab in 3 - 4 weeks.  -- optometry  -- Stop by lab to get testing kit for colon cancer screening.       Wart freezing:  The treated area is likely to blister within a few hours. Sometimes the blister is clear and sometimes it is red or purple because of bleeding (this is harmless). Treatment near the eye may result in a puffy eyelid, especially the following morning, but the swelling settles within a few days. Within a few days a scab forms and the blister gradually dries up.   Usually no special attention is needed during the healing phase. The treated area may be gently washed once or twice daily, and should be kept clean. A dressing is optional, but is advisable if the affected area is subject to trauma or clothes rub on it.   When the blister dries to a scab, apply petroleum jelly (Vaseline) and avoid picking at it. The scab peels off after 5-10 days on the face and 3 weeks on the hand. A sore or scab may persist as long as 3 months on the lower leg because healing in this site is often slow.   Secondary infection is uncommon. When it occurs it may cause increased pain, swelling, thick yellow blister fluid, a purulent discharge and/or redness around the treated area. Consult your doctor if you are concerned: topical antiseptics and/or oral antibiotics may be necessary.           Medication(s) prescribed today:    Orders Placed This Encounter   Medications     enalapril (VASOTEC) 2.5 MG tablet     Sig: Take 1 tablet (2.5 mg) by mouth daily     Dispense:  90 tablet     Refill:  3         Schedule diabetic eye exam

## 2018-11-14 NOTE — MR AVS SNAPSHOT
After Visit Summary   11/14/2018    Holly Muir    MRN: [id number]           Patient Information     Date Of Birth          1955        Visit Information        Provider Department      11/14/2018 11:00 AM Provider, [facility]        Care Instructions    1. Increase Basaglar from 55 to 58 units.    2. Increase meal doses:  Breakfast: 6 to 7 units  Lunch: 9 to 10 units  Dinner: 9 to 10 units.     3. Keep return appointment with Dr Medrano on Dec 21st.     4. You're doing good : ) : ) : )    Mikayla Sellers, RN, BSN, CDE   [facility]          Follow-ups after your visit        Your next 10 appointments already scheduled     Nov 14, 2018  1:10 PM CST   Return Visit with Tiffany King MD PhD   [facility])    [address] [place] [address] [phone]            Dec 21, 2018  2:15 PM CST   Return Visit with Elizabeth Medrano MD   [facility])    [address] [place] [address] [phone]              Who to contact     If you have questions or need follow up information about today's clinic visit or your schedule please contact [facility] directly at [phone].  Normal or non-critical lab and imaging results will be communicated to you by MyChart, letter or phone within 4 business days after the clinic has received the results. If you do not hear from us within 7 days, please contact the clinic through MyChart or phone. If you have a critical or abnormal lab result, we will notify you by phone as soon as possible.  Submit refill requests through [employer] or call your pharmacy and they will forward the refill request to us. Please allow 3 business days for your refill to be completed.          Additional Information About Your Visit        Care EveryWhere ID     This is your Care EveryWhere ID. This could be  used by other organizations to access your [facility] medical records  [id number]         Blood Pressure from Last 3 Encounters:   08/15/18 138/70   08/08/18 90/62   12/31/17 110/60    Weight from Last 3 Encounters:   08/08/18 87.1 kg (192 lb)   02/15/18 90.7 kg (200 lb)   01/25/18 88.9 kg (196 lb)              Today, you had the following     No orders found for display       Primary Care Provider Office Phone # Fax #    Tiffany King MD PhD [phone] [address]  [facility] [address]        Goals        General    I will continue to monitor my blood sugars and treat the results as directed. (pt-stated)     Notes - Note created  12/9/2015 10:26 AM by Randy Suarez RN    As of today's date 12/9/2015 goal is met at 0 - 25%.   Goal Status:  Active          Equal Access to Services     [facility] AH: Hadii aad ku hadasho Soomaali, waaxda luqadaha, qaybta kaalmada adeegyada, waxay roula haypablo iqbal . So [facility] [phone].    ATENCIÓN: Si habla español, tiene a lopez disposición servicios gratuitos de asistencia lingüística. Llame al [phone].    We comply with applicable federal civil rights laws and [place] laws. We do not discriminate on the basis of race, color, national origin, age, disability, sex, sexual orientation, or gender identity.            Thank you!     Thank you for choosing [facility]  for your care. Our goal is always to provide you with excellent care. Hearing back from our patients is one way we can continue to improve our services. Please take a few minutes to complete the written survey that you may receive in the mail after your visit with us. Thank you!             Your Updated Medication List - Protect others around you: Learn how to safely use, store and throw away your medicines at www.disposemymeds.org.          This list is accurate as of 11/14/18 11:08 AM.  Always use your most recent med list.                   Brand Name  Dispense Instructions for use Diagnosis    * ARIPiprazole 20 MG tablet    ABILIFY     Take 1 tablet (20 mg) by mouth daily Along with 5mg tab to total 25mg.  Prescribed by Dr. Isael Jiménez at [facility]    Visual hallucination, Severe recurrent major depressive disorder with psychotic features (H)       * ARIPiprazole 5 MG tablet    ABILIFY    30 tablet    Take 1 tablet (5 mg) by mouth At Bedtime Along with 5mg tab to total 25mg.  Prescribed by Dr. Isael Jiménez at [facility]    Severe recurrent major depressive disorder with psychotic features (H)       BASAGLAR 100 UNIT/ML injection     60 mL    Take 55 units daily.    Type 2 diabetes mellitus, uncontrolled (H)       blood glucose monitoring lancets     510 each    Use to test blood sugar 6 times daily or as directed.    Type 2 diabetes mellitus, uncontrolled (H)       blood glucose monitoring meter device kit     1 kit    Use to test blood sugars 4 times daily or as directed.    Uncontrolled type 2 diabetes mellitus with hyperglycemia, with long-term current use of insulin (H)       blood glucose monitoring test strip    ACCU-CHEK CELIA    600 each    Use to test blood sugars 6 times daily or as directed.    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)       BusPIRone HCl 30 MG Tabs     180 tablet    TAKE 1 TABLET BY MOUTH TWICE A DAY    Anxiety       [facility] ASPIRIN ADULT LOW DOSE 81 MG chewable tablet   Generic drug:  aspirin     90 tablet    TAKE 1 TABLET (81 MG) BY MOUTH DAILY ***OTC- NOT COVERED BY INS***    Uncontrolled diabetes mellitus (H)       enalapril 2.5 MG tablet    VASOTEC    90 tablet    TAKE 1 TABLET BY MOUTH EVERY DAY    Hypertension goal BP (blood pressure) < 140/90       gabapentin 300 MG capsule    NEURONTIN    360 capsule    TAKE  ONE CAPSULE BY MOUTH TWICE A DAY TAKE 2 CAPSULES BY MOUTH IN THE EVENING    Diabetic polyneuropathy associated with type 2 diabetes mellitus (H)       ibuprofen 800 MG tablet    ADVIL/MOTRIN    30 tablet    Take 1 tablet (800 mg) by mouth every 8 hours as needed for moderate pain (with food)    Closed fracture of fifth metatarsal bone of left foot, physeal involvement unspecified, initial encounter       insulin aspart 100 UNIT/ML injection    NovoLOG FLEXPEN    30 mL    Take as directed with meals. Total daily dose approx 30 units.    Type 2 diabetes mellitus, uncontrolled, with retinopathy (H)       insulin pen needle 31G X 5 MM     400 each    Use four times 4 day times a day    Type 2 diabetes mellitus, uncontrolled (H)       metFORMIN 1000 MG tablet    GLUCOPHAGE    180 tablet    TAKE 1 TABLET BY MOUTH TWICE A DAY WITH MEALS    Uncontrolled type 2 diabetes mellitus with hyperglycemia, with long-term current use of insulin (H)       multivitamin, therapeutic with minerals Tabs tablet     30 each    Take 1 tablet by mouth daily    Severe major depression with psychotic features (H)       nystatin [id number] UNIT/GM Powd    MYCOSTATIN    60 g    Apply 1 dose topically 3 times daily as needed    Skin rash       omeprazole 20 MG CR capsule    priLOSEC    180 capsule    TAKE 2 CAPSULES BY MOUTH DAILY TAKE 30-60 MINUTES BEFORE A MEAL.    Gastroesophageal reflux disease without esophagitis       order for DME     1 Units    Equipment being ordered: Cane    Knee osteoarthritis       order for DME     3 Month    Equipment being ordered:  Ostomy supplies.  Drain Pouch by [employer] #.    Also needs Barrier by [employer] #.    S/P colostomy (H)       * order for DME     1 Device    by Device route continuous Air walker -  Use as instructed    Ankle injury, left, initial encounter, Abnormal x-ray       * order for DME     1 Device    Equipment being ordered: Knee walker     Closed nondisplaced fracture of fifth  metatarsal bone of left foot, initial encounter       PARoxetine 20 MG tablet    PAXIL    30 tablet    TAKE 2 TABLETS AT BEDTIME (Prescribed by Dr. Isael Jim[facility]nez at [facility])    Severe recurrent major depressive disorder with psychotic features (H)       pioglitazone 30 MG tablet    ACTOS    90 tablet    Take 1 tablet (30 mg) by mouth daily    Uncontrolled type 2 diabetes mellitus with hyperglycemia, with long-term current use of insulin (H), Insulin resistance       propranolol 60 MG 24 hr capsule    INDERAL LA    60 capsule    Take 1 capsule (60 mg) by mouth See Admin Instructions Take one po q day.  May repeat dose if tremor persists.    Tremor       simvastatin 20 MG tablet    ZOCOR    30 tablet    TAKE 1 TABLET (20 MG) BY MOUTH AT BEDTIME (DUE FOR OFFICE VISIT FOR FUTURE REFILLS)    High cholesterol       traMADol 50 MG tablet    ULTRAM    60 tablet    Take 1 tablet (50 mg) by mouth 2 times daily as needed for severe pain    Arthritis of knee, Arthralgia of both knees       Universal Remover Wipes Misc     1 Each    uses to remove barrier from colostomy bag at time of change    Colostomy       vitamin D 2000 units tablet     100 tablet    Take 2,000 Units by mouth daily    Severe recurrent major depressive disorder with psychotic features (H)       * Notice:  This list has 4 medication(s) that are the same as other medications prescribed for you. Read the directions carefully, and ask your doctor or other care provider to review them with you.

## 2018-11-14 NOTE — LETTER
My Depression Action Plan  Name: Holly Muir   Date of Birth 1955  Date: 11/14/2018    My doctor: Tiffany King   My clinic: [address] [address] [phone]          GREEN    ZONE   Good Control    What it looks like:     Things are going generally well. You have normal up s and down s. You may even feel depressed from time to time, but bad moods usually last less than a day.   What you need to do:  1. Continue to care for yourself (see self care plan)  2. Check your depression survival kit and update it as needed  3. Follow your physician s recommendations including any medication.  4. Do not stop taking medication unless you consult with your physician first.           YELLOW         ZONE Getting Worse    What it looks like:     Depression is starting to interfere with your life.     It may be hard to get out of bed; you may be starting to isolate yourself from others.    Symptoms of depression are starting to last most all day and this has happened for several days.     You may have suicidal thoughts but they are not constant.   What you need to do:     1. Call your care team, your response to treatment will improve if you keep your care team informed of your progress. Yellow periods are signs an adjustment may need to be made.     2. Continue your self-care, even if you have to fake it!    3. Talk to someone in your support network    4. Open up your depression survival kit           RED    ZONE Medical Alert - Get Help    What it looks like:     Depression is seriously interfering with your life.     You may experience these or other symptoms: You can t get out of bed most days, can t work or engage in other necessary activities, you have trouble taking care of basic hygiene, or basic responsibilities, thoughts of suicide or death that will not go away, self-injurious behavior.     What you need to do:  1. Call your care team and  request a same-day appointment. If they are not available (weekends or after hours) call your local crisis line, emergency room or 911.            Depression Self Care Plan / Survival Kit    Self-Care for Depression  Here s the deal. Your body and mind are really not as separate as most people think.  What you do and think affects how you feel and how you feel influences what you do and think. This means if you do things that people who feel good do, it will help you feel better.  Sometimes this is all it takes.  There is also a place for medication and therapy depending on how severe your depression is, so be sure to consult with your medical provider and/ or Behavioral Health Consultant if your symptoms are worsening or not improving.     In order to better manage my stress, I will:    Exercise  Get some form of exercise, every day. This will help reduce pain and release endorphins, the  feel good  chemicals in your brain. This is almost as good as taking antidepressants!  This is not the same as joining a gym and then never going! (they count on that by the way ) It can be as simple as just going for a walk or doing some gardening, anything that will get you moving.      Hygiene   Maintain good hygiene (Get out of bed in the morning, Make your bed, Brush your teeth, Take a shower, and Get dressed like you were going to work, even if you are unemployed).  If your clothes don't fit try to get ones that do.    Diet  I will strive to eat foods that are good for me, drink plenty of water, and avoid excessive sugar, caffeine, alcohol, and other mood-altering substances.  Some foods that are helpful in depression are: complex carbohydrates, B vitamins, flaxseed, fish or fish oil, fresh fruits and vegetables.    Psychotherapy  I agree to participate in Individual Therapy (if recommended).    Medication  If prescribed medications, I agree to take them.  Missing doses can result in serious side effects.  I understand that  drinking alcohol, or other illicit drug use, may cause potential side effects.  I will not stop my medication abruptly without first discussing it with my provider.    Staying Connected With Others  I will stay in touch with my friends, family members, and my primary care provider/team.    Use your imagination  Be creative.  We all have a creative side; it doesn t matter if it s oil painting, sand castles, or mud pies! This will also kick up the endorphins.    Witness Beauty  (AKA stop and smell the roses) Take a look outside, even in mid-winter. Notice colors, textures. Watch the squirrels and birds.     Service to others  Be of service to others.  There is always someone else in need.  By helping others we can  get out of ourselves  and remember the really important things.  This also provides opportunities for practicing all the other parts of the program.    Humor  Laugh and be silly!  Adjust your TV habits for less news and crime-drama and more comedy.    Control your stress  Try breathing deep, massage therapy, biofeedback, and meditation. Find time to relax each day.     My support system    Clinic Contact:  Phone number:    Contact 1:  Phone number:    Contact 2:  Phone number:    [place]/:  Phone number:    Therapist:  Phone number:    Local crisis center:    Phone number:    Other community support:  Phone number:

## 2018-11-14 NOTE — PROGRESS NOTES
Diabetes Self Management Training: Follow-up Visit    Holly Muir presents today for evaluation of glucose control related to Type 2 diabetes.    She is accompanied by self    Patient's diabetes management related comments/concerns: None    ASSESSMENT:  Patient presents to clinic to meet with Cde for evaluation of bg levels and medication administration.     Current Diabetes Management per Patient:  Taking diabetes medications? Yes:  Current insulin doses: Basaglar: 55 units q pm, Novolou with B, 9u with L & D + sliding scale.   .    Diabetes Medication(s)     Biguanides Sig    metFORMIN (GLUCOPHAGE) 1000 MG tablet TAKE 1 TABLET BY MOUTH TWICE A DAY WITH MEALS    Insulin Sig    BASAGLAR 100 UNIT/ML injection Take 55 units daily.    insulin aspart (NOVOLOG FLEXPEN) 100 UNIT/ML injection Take as directed with meals. Total daily dose approx 30 units.    Insulin Sensitizing Agents Sig    pioglitazone (ACTOS) 30 MG tablet Take 1 tablet (30 mg) by mouth daily        Patient glucose self monitoring as follows: 2.5x/day.   BG meter: AccuChek Verónica.  BG results: Per meter download:   Ave B.  66% readings above target, 34% within target, 0% below target.     BG values are: Not in goal, but slowly improving.   Patient's most recent   Lab Results   Component Value Date    A1C 10.3 2018    is not meeting goal of <7.0    Labs:  Lab Results   Component Value Date    A1C 10.3 2018     Lab Results   Component Value Date     2018     Lab Results   Component Value Date    LDL 21 2016     HDL Cholesterol   Date Value Ref Range Status   2016 58 >49 mg/dL Final   ]  GFR Estimate   Date Value Ref Range Status   2018 80 >60 mL/min/1.7m2 Final     Comment:     Non  GFR Calc     GFR Estimate If Black   Date Value Ref Range Status   2018 >90 >60 mL/min/1.7m2 Final     Comment:      GFR Calc     Lab Results   Component Value Date    CR 0.74  08/08/2018     Health Beliefs and Attitudes:   Patient Activation Measure Survey Score:  RADHA Score (Last Two) 9/15/2011 1/5/2012   RADHA Raw Score 30 35   Activation Score 37.3 45.2   RADHA Level 1 1       Diabetes knowledge and skills assessment:     Patient is knowledgeable in diabetes management concepts related to: Healthy Eating, Being Active, Monitoring and Taking Medication    Patient needs further education on the following diabetes management concepts: Problem Solving    Barriers to Learning Assessment: Slight cognitive impairment and short term memory loss.    Based on learning assessment above, most appropriate setting for further diabetes education would be: Individual setting.    INTERVENTION:   Education provided today on:  Healthy Eating: Discussed serving size of cereal eaten at 2am.   Taking Medication: Insulin dose changes made.     Opportunities for ongoing education and support in diabetes-self management were discussed.    Pt verbalized understanding of concepts discussed and recommendations provided today.       Education Materials Provided:  No new materials provided today    PLAN:  Healthy Eating: decrease serving size of cereal with milk. Pt, for many years, has woken up around 2-3am to snack. Likes to have cereal with milk. Has tried to break this habit but has been unable to do so.   Taking Medication: Increase Basaglar to 58 units. Increase B dose Novolog to 7 units, L & D to 10 units.     FOLLOW-UP:  Keep f/u appt with Endo in Dec.     Ongoing plan for education and support: as needed.    Time Spent: 35 minutes  Encounter Type: Individual    Any diabetes medication dose changes were made via the CDE Protocol and Collaborative Practice Agreement with the patient's endocrinology provider. A copy of this encounter was shared with the provider.    Holly Muir comes into clinic today at the request of Dr Medrano, Ordering Provider for Pt Teaching on diabetes self mgmt.     This service provided  today was under the supervising provider of the day Dr Lennon, who was available if needed.    Mikayla Sellers, RN, BSN, CDE   [facility]

## 2018-11-14 NOTE — PATIENT INSTRUCTIONS
1. Increase Basaglar from 55 to 58 units.    2. Increase meal doses:  Breakfast: 6 to 7 units  Lunch: 9 to 10 units  Dinner: 9 to 10 units.     3. Keep return appointment with Dr Medrano on Dec 21st.     4. You're doing good : ) : ) : )    Mikayla Sellers, RN, BSN, CDE   [facility]

## 2018-11-15 ENCOUNTER — TELEPHONE (OUTPATIENT)
Dept: PEDIATRICS | Facility: CLINIC | Age: 63
End: 2018-11-15

## 2018-11-15 ASSESSMENT — PATIENT HEALTH QUESTIONNAIRE - PHQ9: SUM OF ALL RESPONSES TO PHQ QUESTIONS 1-9: 11

## 2018-11-15 NOTE — TELEPHONE ENCOUNTER
Carson from [employer] called and said patient has had several falls and does not have a hand rail.  [facility] is requesting a Benefit Exception letter.  Carson advised [facility] would like the Provider to reach out.  [phone]- Provider Line

## 2018-11-15 NOTE — LETTER
November 20, 2018      Holly Muir  [address] [facility] [address]              To Whom It May Concern,    Holly Muir has had several falls at home. Please provide hand rails for the stairs and grab bars for shower.     Should you have any questions, please feel free to contact me at the address above.            Sincerely,        Tiffany King MD PhD

## 2018-11-16 DIAGNOSIS — M17.10 ARTHRITIS OF KNEE: ICD-10-CM

## 2018-11-16 DIAGNOSIS — M25.562 ARTHRALGIA OF BOTH KNEES: ICD-10-CM

## 2018-11-16 DIAGNOSIS — M25.561 ARTHRALGIA OF BOTH KNEES: ICD-10-CM

## 2018-11-16 RX ORDER — TRAMADOL HYDROCHLORIDE 50 MG/1
TABLET ORAL
Qty: 60 TABLET | Refills: 1 | Status: SHIPPED | OUTPATIENT
Start: 2018-11-16 | End: 2019-01-29

## 2018-11-16 ASSESSMENT — ANXIETY QUESTIONNAIRES: GAD7 TOTAL SCORE: 8

## 2018-11-16 NOTE — TELEPHONE ENCOUNTER
Routing refill request to provider for review/approval because:  Drug not on the [facility] refill protocol     traMADol (ULTRAM) 50 MG tablet 60 tablet 2 8/8/2018  No      Sig - Route: Take 1 tablet (50 mg) by mouth 2 times daily as needed for severe pain - Oral       Last office visit:  11/14/18      Next 5 appointments (look out 90 days)     Dec 14, 2018 11:50 AM CST   Return Visit with Tiffany King MD PhD   [facility])    [address] [place] [address]   [phone]            Dec 21, 2018  2:15 PM CST   Return Visit with Elizabeth Medrano MD   [facility])    [address] [place] [address]   [phone]                Zoraida Min RN,   . [facility]

## 2018-11-20 NOTE — TELEPHONE ENCOUNTER
Spoke with Janett at [facility] she provided fax number [phone] for the letter to be faxed to.    Letter faxed at 4:57pm on 11/20/18.     Helena Dickson RN

## 2018-11-27 DIAGNOSIS — F41.9 ANXIETY: ICD-10-CM

## 2018-11-27 NOTE — TELEPHONE ENCOUNTER
Asa  Last Written Prescription Date:  1-19-18  Last Fill Quantity: 90,  # refills: 3   Last office visit: No previous visit found with prescribing provider:  laya   Future Office Visit:   Next 5 appointments (look out 90 days)     Dec 14, 2018 11:50 AM CST   Return Visit with Tiffany King MD PhD   [facility])    [address] [facility] [address]   [phone]            Dec 21, 2018  2:15 PM CST   [facility] Visit with Elizabeth Medrano MD   [facility]    [address] [facility] [address]   [phone]                       Buspirone  -Last Written Prescription Date:    L-ast Fill Quantity: not listed,  # refills: not listed   Last office visit: No previous visit found with prescribing provider:  laya   Future Office Visit:   Next 5 appointments (look out 90 days)     Dec 14, 2018 11:50 AM CST   Return Visit with Tiffany Knig MD PhD   [facility]    [address] [facility] [address]   [phone]            Dec 21, 2018  2:15 PM CST   Return Visit with Elizabeth Medrano MD   [facility])    [address] [address] [address]   [phone]

## 2018-11-28 RX ORDER — ASPIRIN 81 MG/1
TABLET, CHEWABLE ORAL
Qty: 90 TABLET | Refills: 3 | Status: SHIPPED | OUTPATIENT
Start: 2018-11-28 | End: 2020-01-31

## 2018-11-28 RX ORDER — BUSPIRONE HYDROCHLORIDE 30 MG/1
TABLET ORAL
Qty: 180 TABLET | Refills: 1 | Status: SHIPPED | OUTPATIENT
Start: 2018-11-28 | End: 2019-06-25

## 2018-11-28 NOTE — TELEPHONE ENCOUNTER
Refilled per protocol.    Amairani Hernandez RN   .[facility]

## 2018-12-05 ENCOUNTER — TELEPHONE (OUTPATIENT)
Dept: PEDIATRICS | Facility: CLINIC | Age: 63
End: 2018-12-05

## 2018-12-05 NOTE — TELEPHONE ENCOUNTER
Spoke with Ashley with [employer] - she reports that they do not cover the grab bars for toilet or shower handrail. Ashley recommended that the patient purchase at [employer] or [employer] as it is cheaper than the medical equipment stores.     Writer called patient and informed her of the above. Patient verbalized understanding and will call her son-in-law to help her with this.     Helena Dickson RN

## 2018-12-06 NOTE — TELEPHONE ENCOUNTER
Faxed refill request received from [facility].   Medication Requested: Novolog 100 units/ml Flexpen  Directions: take as directed with meals, total daily dose approx 30 units  Quantity:30ml  Last Office Visit: 9/28/17  Next Appointment Scheduled for: 12/21/18

## 2018-12-11 ENCOUNTER — TELEPHONE (OUTPATIENT)
Dept: DERMATOLOGY | Facility: CLINIC | Age: 63
End: 2018-12-11

## 2018-12-11 NOTE — TELEPHONE ENCOUNTER
**LOOK AT HEALTH MAINTENANCE**        Dermatology Pre-visit Call:    Reason for visit : warts on hand     Any personal history of skin cancers: No    Any family history of skin cancers: No    Was the patient referred: Yes Dr King    If the patient was referred, are records obtained: No.     Has the patient seen a dermatologist in the past: No.     Patient Reminders Given:  --Please, make sure you bring an updated list of your medications, allergies and insurance information.  --Plan on being in our facility for approximately one hour, this includes the registration process, office visit, education and check-out process.  If you are having a procedure, more time may be required.     --We are located on the second floor of the building, check in desk D.   --If you need to cancel or reschedule, call [phone].  --We look forward to seeing you in Dermatology Clinic.       More Alvarez LPN

## 2018-12-13 ENCOUNTER — OFFICE VISIT (OUTPATIENT)
Dept: DERMATOLOGY | Facility: CLINIC | Age: 63
End: 2018-12-13
Attending: INTERNAL MEDICINE
Payer: MEDICARE

## 2018-12-13 DIAGNOSIS — B07.8 OTHER VIRAL WARTS: Primary | ICD-10-CM

## 2018-12-13 PROCEDURE — 11900 INJECT SKIN LESIONS </W 7: CPT | Mod: 59 | Performed by: DERMATOLOGY

## 2018-12-13 PROCEDURE — 99207 ZZC NO CHARGE INJECTABLE MED/DRUG: CPT | Performed by: DERMATOLOGY

## 2018-12-13 PROCEDURE — 17110 DESTRUCTION B9 LES UP TO 14: CPT | Performed by: DERMATOLOGY

## 2018-12-13 RX ORDER — CANDIDA ALBICANS 1000 [PNU]/ML
0.1 INJECTION, SOLUTION INTRADERMAL ONCE
Qty: 1 ML | Refills: 0 | OUTPATIENT
Start: 2018-12-13 | End: 2018-12-13

## 2018-12-13 NOTE — PATIENT INSTRUCTIONS
Start at home salicylic acid treatment, a week after cryotherapy treatment. Soak hands in warm water for 10 minutes, then apply Wart Stick 40% (found on [employer]) at bedtime, cover with a Bandaid. Remove the Band-aid in the morning.     Cryotherapy    What is it?    Use of a very cold liquid, such as liquid nitrogen, to freeze and destroy abnormal skin cells that need to be removed    What should I expect?    Tenderness and redness    A small blister that might grow and fill with dark purple blood. There may be crusting.    More than one treatment may be needed if the lesions do not go away.    How do I care for the treated area?    Gently wash the area with your hands when bathing.    Use a thin layer of Vaseline to help with healing. You may use a Band-Aid.     The area should heal within 7-10 days and may leave behind a pink or lighter color.     Do not use an antibiotic or Neosporin ointment.     You may take acetaminophen (Tylenol) for pain.     Call your Doctor if you have:    Severe pain    Signs of infection (warmth, redness, cloudy yellow drainage, and or a bad smell)    Questions or concerns    Who should I call with questions?       [facility]: [phone]       [facility]: [phone]       For urgent needs outside of business hours call the [facility] at [phone]        and ask for the dermatology resident on call

## 2018-12-13 NOTE — NURSING NOTE
Holly Muir's goals for this visit include:   Chief Complaint   Patient presents with     Derm Problem     Viral wart on fingers on bilateral hands. Pt states that her concerns were noticed approxemately 3 months ago. Pt states that her regular provider have freezed them but the treatment has not completely solve the problem.       She requests these members of her care team be copied on today's visit information: Yes    PCP: Tiffany King    Referring Provider:  Tiffany King MD PhD  [address] [place] [address]    There were no vitals taken for this visit.    Do you need any medication refills at today's visit? No    Ralph Price CMA ([facility])

## 2018-12-13 NOTE — PROGRESS NOTES
[facility] Dermatology Note      Dermatology Problem List:  1. Verrucous vulgaris x3  -s/p cryotherapy and candida injections 12/13/2018  -home treatments with salicyclic acid    Encounter Date: Dec 13, 2018    CC:  Chief Complaint   Patient presents with     Derm Problem     Viral wart on finger         History of Present Illness:  Ms. Holly Muir is a 63 year old female who presents as a referral from Dr. King for warts on the left ring finger and right thumb. She has had it treated with cryotherapy in the past. The areas initially fell off, but then they recurred. She has them for months. She has not tried any over the counter treatments. No other concerns addressed today.    Past Medical History:   Patient Active Problem List   Diagnosis     Hyperlipidemia LDL goal <100     Severe major depression with psychotic features (H)     History of cervical cancer     S/P colostomy (H)     Mixed incontinence urge and stress     Memory loss     Type 2 diabetes mellitus, uncontrolled A1C goal <8%     Obesity     Pain in joint, hand     Trigger finger, acquired     Synovitis and tenosynovitis     Dermatochalasis     Presbyopia     OCD (obsessive compulsive disorder)     HTN, goal below 140/90     Cataracts, both eyes     Health Care Home     Lumbago     Sprain of lumbar region     Generalized osteoarthrosis, unspecified site     Rotator cuff impingement syndrome     Hypoglycemia due to insulin     Encounter for long-term (current) use of insulin (H)     Colostomy, evaluate (H)     Urinary retention     ACP (advance care planning)     Bilateral low back pain without sciatica     Anxiety     Fecal incontinence due to anorectal disorder     Visual hallucination     Type 2 diabetes mellitus with hyperglycemia (H)     Chronic pain syndrome     Obesity (BMI 35.0-39.9) with comorbidity (H)     Past Medical History:   Diagnosis Date     Cataracts, both eyes 5/8/2013     Cervical cancer (H)      Diabetes       Diabetic retinopathy (H)      HTN      Inflammatory arthritis      Major depression      Memory loss      Nephropathy      OA (osteoarthritis) of knee 9/11/2013     Other and unspecified hyperlipidemia      Pterygium eye      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     Past Surgical History:   Procedure Laterality Date     ARTHROSCOPY KNEE RT/LT      LT     BREAST LUMPECTOMY, RT/LT      LT-benign      C EXCIS PTERYGIUM  10/08    [facility]     C STOMACH SURGERY PROCEDURE UNLISTED       COLONOSCOPY  5/10/2012    Procedure:COLONOSCOPY; screening colonoscopy; Surgeon:MILLA VEGA; Location:[facility]     COLOSTOMY  2000     COLOSTOMY       HC COLONOSCOPY THRU STOMA, DIAGNOSTIC  2002    normal per report-no records available-records destroyed     HYSTERECTOMY, PAP NO LONGER INDICATED      done in [place]-cervical cancer     IMPLANT STIMULATOR SACRAL NERVE STAGE ONE Right 3/10/2015    Procedure: IMPLANT STIMULATOR SACRAL NERVE STAGE ONE;  Surgeon: Teodora Yusuf MD;  Location: [facility] OR     IMPLANT STIMULATOR SACRAL NERVE STAGE TWO Right 3/31/2015    Procedure: IMPLANT STIMULATOR SACRAL NERVE STAGE TWO;  Surgeon: Teodora Yusuf MD;  Location: [facility] OR     KNEE SURGERY       SALPINGO OOPHORECTOMY,R/L/JENNIFER      Salpingo Oophorectomy, RT/LT/JENNIFER       Social History:  Patient reports that she quit smoking about 2 years ago. she has never used smokeless tobacco. She reports that she drinks alcohol. She reports that she does not use drugs. Patient is on disability       Family History:  Family History   Problem Relation Age of Onset     Diabetes Mother      Cerebrovascular Disease Mother      Diabetes Father      Hypertension Father      Cancer Maternal Grandfather      Cancer Paternal Grandfather      Diabetes Brother      Diabetes Sister      Thyroid Disease Daughter      Thyroid Disease Sister      Multiple Sclerosis Daughter      Glaucoma No family hx of      Macular Degeneration No family hx of         Medications:  Current Outpatient Medications   Medication Sig Dispense Refill     ARIPiprazole (ABILIFY) 20 MG tablet Take 1 tablet (20 mg) by mouth daily Along with 5mg tab to total 25mg.  Prescribed by Dr. Isael Jiménez at [facility]       ARIPiprazole (ABILIFY) 5 MG tablet Take 1 tablet (5 mg) by mouth At Bedtime Along with 5mg tab to total 25mg.  Prescribed by Dr. Isael Jiménez at [facility] 30 tablet 1     aspirin ([employer] ASPIRIN ADULT LOW DOSE) 81 MG chewable tablet TAKE 1 TABLET (81 MG) BY MOUTH DAILY 90 tablet 3     BASAGLAR 100 UNIT/ML injection Take 55 units daily. 60 mL 0     BASAGLAR 100 UNIT/ML injection Inject 55 Units Subcutaneous At Bedtime 16.5 mL 3     blood glucose monitoring (ACCU-CHEK CELIA PLUS) meter device kit Use to test blood sugars 4 times daily or as directed. 1 kit 0     blood glucose monitoring (ACCU-CHEK CELIA) test strip Use to test blood sugars 6 times daily or as directed. 600 each 1     blood glucose monitoring (ACCU-CHEK FASTCLIX) lancets Use to test blood sugar 6 times daily or as directed. 510 each 1     buPROPion (WELLBUTRIN XL) 150 MG 24 hr tablet TAKE 3 TABLETS (450 MG) BY MOUTH EVERY MORNING       busPIRone HCl (BUSPAR) 30 MG tablet TAKE 1 TABLET BY MOUTH TWICE A  tablet 1     Cholecalciferol (VITAMIN D) 2000 units tablet Take 2,000 Units by mouth daily 100 tablet 3     enalapril (VASOTEC) 2.5 MG tablet Take 1 tablet (2.5 mg) by mouth daily 90 tablet 3     gabapentin (NEURONTIN) 300 MG capsule TAKE ONE CAPSULE BY MOUTH TWICE A DAY TAKE 2 CAPSULES BY MOUTH IN THE EVENING 360 capsule 1     ibuprofen (ADVIL/MOTRIN) 800 MG tablet Take 1 tablet (800 mg) by mouth every 8 hours as needed for moderate pain (with food) 30 tablet 0     insulin aspart (NOVOLOG FLEXPEN) 100 UNIT/ML pen Take as directed with meals. Total daily dose approx 30 units. 30 mL 0     insulin pen needle 31G X 5 MM Use four times 4 day times a day 400 each 1     metFORMIN  (GLUCOPHAGE) 1000 MG tablet TAKE 1 TABLET BY MOUTH TWICE A DAY WITH MEALS 180 tablet 0     multivitamin, therapeutic with minerals (MULTI-VITAMIN) TABS tablet Take 1 tablet by mouth daily 30 each 11     nystatin (MYCOSTATIN) 098631 UNIT/GM POWD Apply 1 dose topically 3 times daily as needed 60 g 3     omeprazole (PRILOSEC) 20 MG CR capsule TAKE 2 CAPSULES BY MOUTH DAILY TAKE 30-60 MINUTES BEFORE A MEAL. 180 capsule 2     order for DME Equipment being ordered: grab bar for bath tub/shower. 1 Units 0     order for DME Equipment being ordered: railing for stairs at home. 1 Units 0     order for DME Equipment being ordered: Knee walker  1 Device 0     order for DME by Device route continuous Air walker -  Use as instructed 1 Device 0     ORDER FOR DME Equipment being ordered:  Ostomy supplies.  Drain Pouch by [employer] #.    Also needs Barrier by [employer] #. 3 Month 4     ORDER FOR DME Equipment being ordered: Cane 1 Units 0     PARoxetine (PAXIL) 20 MG tablet TAKE 2 TABLETS AT BEDTIME (Prescribed by Dr. Isael Jiménez at [facility]) 30 tablet      pioglitazone (ACTOS) 30 MG tablet Take 1 tablet (30 mg) by mouth daily 90 tablet 1     propranolol (INDERAL LA) 60 MG 24 hr capsule Take 1 capsule (60 mg) by mouth See Admin Instructions Take one po q day.  May repeat dose if tremor persists. 60 capsule 11     simvastatin (ZOCOR) 20 MG tablet TAKE 1 TABLET (20 MG) BY MOUTH AT BEDTIME (DUE FOR OFFICE VISIT FOR FUTURE REFILLS) 30 tablet 0     traMADol (ULTRAM) 50 MG tablet TAKE 1 TABLET BY MOUTH 2 TIMES A DAY AS NEEDED FOR SEVERE PAIN 60 tablet 1     UNIVERSAL REMOVER WIPES EX MISC uses to remove barrier from colostomy bag at time of change 1 Each 3       Allergies   Allergen Reactions     Morphine Sulfate Itching       Review of Systems:  -Constitutional: Patient is otherwise feeling well, in usual state of health.   -Skin: As above in HPI. No additional skin concerns.    Physical exam:  Vitals: There  were no vitals taken for this visit.  GEN: This is a well developed, well-nourished female in no acute distress, in a pleasant mood.    SKIN: Sun-exposed skin, which includes the head/face, neck, both arms, digits, and/or nails was examined.   - verrucous plaque right ring finger medial aspect of nail fold  - verrucous papule on the right palmar thumb  - verrucous papule on the right fourth finger in the periungal skin   - no other lesions of concern on areas examined.       Impression/Plan:  1. Verrucous vulgaris: Discussed the viral nature of warts. Explained that there are many treatment options and none of them work consistently for every wart. At this time, I recommended a combination of home and in office treatments. In office monthly, we will do cryotherapy and candida injections (expectations and risks discussed in detail).  In between visits, will have patient use over the counter salicylic acid nightly.    Cryotherapy procedure note: After verbal consent and discussion of risks and benefits including but no limited to dyspigmentation/scar, blister, and pain, 3 was(were) treated with 1-2mm freeze border for 3 cycles with liquid nitrogen. Post cryotherapy instructions were provided.    After cleansing with alcohol, a total of 0.2 cc of candida antigen was injected into a suitable lesion.  The patient tolerated the procedure well.          CC Tiffany King MD PhD on close of this encounter.  Follow-up in 4 weeks, earlier for new or changing lesions.       Staff Involved:  Scribe/Staff    Scribe Disclosure  I, Carmela Gage, am serving as a scribe to document services personally performed by Dr. Rossy Harding MD, based on data collection and the provider's statements to me.     Provider Disclosure:   The documentation recorded by the scribe accurately reflects the services I personally performed and the decisions made by me.    Rossy Harding MD    Department of Dermatology  [facility]  [facility]: Phone: [phone], Fax:[phone]  [facility]: Phone: [phone], Fax: [phone]             No

## 2018-12-13 NOTE — LETTER
12/13/2018         RE: Holly Muir  [address] [address] [address]        Dear Colleague,    Thank you for referring your patient, Holly Muir, to the [facility]. Please see a copy of my visit note below.        [facility] Dermatology Note      Dermatology Problem List:  1. Verrucous vulgaris x3  -s/p cryotherapy and candida injections 12/13/2018  -home treatments with salicyclic acid    Encounter Date: Dec 13, 2018    CC:  Chief Complaint   Patient presents with     Derm Problem     Viral wart on finger         History of Present Illness:  Ms. Holly Muir is a 63 year old female who presents as a referral from Dr. King for warts on the left ring finger and right thumb. She has had it treated with cryotherapy in the past. The areas initially fell off, but then they recurred. She has them for months. She has not tried any over the counter treatments. No other concerns addressed today.    Past Medical History:   Patient Active Problem List   Diagnosis     Hyperlipidemia LDL goal <100     Severe major depression with psychotic features (H)     History of cervical cancer     S/P colostomy (H)     Mixed incontinence urge and stress     Memory loss     Type 2 diabetes mellitus, uncontrolled A1C goal <8%     Obesity     Pain in joint, hand     Trigger finger, acquired     Synovitis and tenosynovitis     Dermatochalasis     Presbyopia     OCD (obsessive compulsive disorder)     HTN, goal below 140/90     Cataracts, both eyes     Health Care Home     Lumbago     Sprain of lumbar region     Generalized osteoarthrosis, unspecified site     Rotator cuff impingement syndrome     Hypoglycemia due to insulin     Encounter for long-term (current) use of insulin (H)     Colostomy, evaluate (H)     Urinary retention     ACP (advance care planning)     Bilateral low back pain without sciatica     Anxiety     Fecal incontinence due to anorectal disorder      Visual hallucination     Type 2 diabetes mellitus with hyperglycemia (H)     Chronic pain syndrome     Obesity (BMI 35.0-39.9) with comorbidity (H)     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     Past Surgical History:   Procedure Laterality Date     ARTHROSCOPY KNEE RT/LT      LT     BREAST LUMPECTOMY, RT/LT      LT-benign      C EXCIS PTERYGIUM  10/08    [facility]     C STOMACH SURGERY PROCEDURE UNLISTED       COLONOSCOPY  5/10/2012    Procedure:COLONOSCOPY; screening colonoscopy; Surgeon:MILLA VEGA; Location:[facility] OR     COLOSTOMY  2000     COLOSTOMY       HC COLONOSCOPY THRU STOMA, DIAGNOSTIC  2002    normal per report-no records available-records destroyed     HYSTERECTOMY, PAP NO LONGER INDICATED      done in [place]-cervical cancer     IMPLANT STIMULATOR SACRAL NERVE STAGE ONE Right 3/10/2015    Procedure: IMPLANT STIMULATOR SACRAL NERVE STAGE ONE;  Surgeon: Teodora Yusuf MD;  [facility]: [facility] OR     IMPLANT STIMULATOR SACRAL NERVE STAGE TWO Right 3/31/2015    Procedure: IMPLANT STIMULATOR SACRAL NERVE STAGE TWO;  Surgeon: Teodora Yusuf MD;  Location: [facility] OR     KNEE SURGERY       SALPINGO OOPHORECTOMY,R/L/JENNIFER      Salpingo Oophorectomy, RT/LT/JENNIFER       Social History:  Patient reports that she quit smoking about 2 years ago. she has never used smokeless tobacco. She reports that she drinks alcohol. She reports that she does not use drugs. Patient is on disability       Family History:  Family History   Problem Relation Age of Onset     Diabetes Mother      Cerebrovascular Disease Mother      Diabetes Father      Hypertension Father      Cancer Maternal  Grandfather      Cancer Paternal Grandfather      Diabetes Brother      Diabetes Sister      Thyroid Disease Daughter      Thyroid Disease Sister      Multiple Sclerosis Daughter      Glaucoma No family hx of      Macular Degeneration No family hx of        Medications:  Current Outpatient Medications   Medication Sig Dispense Refill     ARIPiprazole (ABILIFY) 20 MG tablet Take 1 tablet (20 mg) by mouth daily Along with 5mg tab to total 25mg.  Prescribed by Dr. Isael Jiménez at [facility]       ARIPiprazole (ABILIFY) 5 MG tablet Take 1 tablet (5 mg) by mouth At Bedtime Along with 5mg tab to total 25mg.  Prescribed by Dr. [facility]ael [facility]nez at [facility] 30 tablet 1     aspirin ([employer] ASPIRIN ADULT LOW DOSE) 81 MG chewable tablet TAKE 1 TABLET (81 MG) BY MOUTH DAILY 90 tablet 3     BASAGLAR 100 UNIT/ML injection Take 55 units daily. 60 mL 0     BASAGLAR 100 UNIT/ML injection Inject 55 Units Subcutaneous At Bedtime 16.5 mL 3     blood glucose monitoring (ACCU-CHEK CELIA PLUS) meter device kit Use to test blood sugars 4 times daily or as directed. 1 kit 0     blood glucose monitoring (ACCU-CHEK CELIA) test strip Use to test blood sugars 6 times daily or as directed. 600 each 1     blood glucose monitoring (ACCU-CHEK FASTCLIX) lancets Use to test blood sugar 6 times daily or as directed. 510 each 1     buPROPion (WELLBUTRIN XL) 150 MG 24 hr tablet TAKE 3 TABLETS (450 MG) BY MOUTH EVERY MORNING       busPIRone HCl (BUSPAR) 30 MG tablet TAKE 1 TABLET BY MOUTH TWICE A  tablet 1     Cholecalciferol (VITAMIN D) 2000 units tablet Take 2,000 Units by mouth daily 100 tablet 3     enalapril (VASOTEC) 2.5 MG tablet Take 1 tablet (2.5 mg) by mouth daily 90 tablet 3     gabapentin (NEURONTIN) 300 MG capsule TAKE ONE CAPSULE BY MOUTH TWICE A DAY TAKE 2 CAPSULES BY MOUTH IN THE EVENING 360 capsule 1     ibuprofen (ADVIL/MOTRIN) 800 MG tablet Take 1 tablet (800 mg) by mouth every 8 hours as needed for moderate pain  (with food) 30 tablet 0     insulin aspart (NOVOLOG FLEXPEN) 100 UNIT/ML pen Take as directed with meals. Total daily dose approx 30 units. 30 mL 0     insulin pen needle 31G X 5 MM Use four times 4 day times a day 400 each 1     metFORMIN (GLUCOPHAGE) 1000 MG tablet TAKE 1 TABLET BY MOUTH TWICE A DAY WITH MEALS 180 tablet 0     multivitamin, therapeutic with minerals (MULTI-VITAMIN) TABS tablet Take 1 tablet by mouth daily 30 each 11     nystatin (MYCOSTATIN) 389258 UNIT/GM POWD Apply 1 dose topically 3 times daily as needed 60 g 3     omeprazole (PRILOSEC) 20 MG CR capsule TAKE 2 CAPSULES BY MOUTH DAILY TAKE 30-60 MINUTES BEFORE A MEAL. 180 capsule 2     order for DME Equipment being ordered: grab bar for bath tub/shower. 1 Units 0     order for DME Equipment being ordered: railing for stairs at home. 1 Units 0     order for DME Equipment being ordered: Knee walker  1 Device 0     order for DME by Device route continuous Air walker -  Use as instructed 1 Device 0     ORDER FOR DME Equipment being ordered:  Ostomy supplies.  Drain Pouch by [address] [employer] #.    Also needs Barrier by [address] [employer] #. 3 Month 4     ORDER FOR DME Equipment being ordered: Cane 1 Units 0     PARoxetine (PAXIL) 20 MG tablet TAKE 2 TABLETS AT BEDTIME (Prescribed by Dr. Isael Jiménez at [facility]) 30 tablet      pioglitazone (ACTOS) 30 MG tablet Take 1 tablet (30 mg) by mouth daily 90 tablet 1     propranolol (INDERAL LA) 60 MG 24 hr capsule Take 1 capsule (60 mg) by mouth See Admin Instructions Take one po q day.  May repeat dose if tremor persists. 60 capsule 11     simvastatin (ZOCOR) 20 MG tablet TAKE 1 TABLET (20 MG) BY MOUTH AT BEDTIME (DUE FOR OFFICE VISIT FOR FUTURE REFILLS) 30 tablet 0     traMADol (ULTRAM) 50 MG tablet TAKE 1 TABLET BY MOUTH 2 TIMES A DAY AS NEEDED FOR SEVERE PAIN 60 tablet 1     UNIVERSAL REMOVER WIPES EX MISC uses to remove barrier from colostomy bag at time of change 1 Each 3       Allergies    Allergen Reactions     Morphine Sulfate Itching       Review of Systems:  -Constitutional: Patient is otherwise feeling well, in usual state of health.   -Skin: As above in HPI. No additional skin concerns.    Physical exam:  Vitals: There were no vitals taken for this visit.  GEN: This is a well developed, well-nourished female in no acute distress, in a pleasant mood.    SKIN: Sun-exposed skin, which includes the head/face, neck, both arms, digits, and/or nails was examined.   - verrucous plaque right ring finger medial aspect of nail fold  - verrucous papule on the right palmar thumb  - verrucous papule on the right fourth finger in the periungal skin   - no other lesions of concern on areas examined.       Impression/Plan:  1. Verrucous vulgaris: Discussed the viral nature of warts. Explained that there are many treatment options and none of them work consistently for every wart. At this time, I recommended a combination of home and in office treatments. In office monthly, we will do cryotherapy and candida injections (expectations and risks discussed in detail).  In between visits, will have patient use over the counter salicylic acid nightly.    Cryotherapy procedure note: After verbal consent and discussion of risks and benefits including but no limited to dyspigmentation/scar, blister, and pain, 3 was(were) treated with 1-2mm freeze border for 3 cycles with liquid nitrogen. Post cryotherapy instructions were provided.    After cleansing with alcohol, a total of 0.2 cc of candida antigen was injected into a suitable lesion.  The patient tolerated the procedure well.          CC Tiffany King MD PhD on close of this encounter.  Follow-up in 4 weeks, earlier for new or changing lesions.       Staff Involved:  Scribe/Staff    Scribe Disclosure  I, Carmela Gage, am serving as a scribe to document services personally performed by Dr. Rossy Harding MD, based on data collection and the provider's statements to  me.     Provider Disclosure:   The documentation recorded by the scribe accurately reflects the services I personally performed and the decisions made by me.    Rossy Harding MD    Department of Dermatology  [facility]: Phone: [phone], Fax:[phone]  [facility]: Phone: [phone], Fax: [phone]          ain, thank you for allowing me to participate in the care of your patient.        Sincerely,        Rossy Harding MD

## 2018-12-14 ENCOUNTER — OFFICE VISIT (OUTPATIENT)
Dept: PEDIATRICS | Facility: CLINIC | Age: 63
End: 2018-12-14
Payer: MEDICARE

## 2018-12-14 VITALS
WEIGHT: 202 LBS | DIASTOLIC BLOOD PRESSURE: 64 MMHG | OXYGEN SATURATION: 100 % | SYSTOLIC BLOOD PRESSURE: 106 MMHG | HEART RATE: 71 BPM | BODY MASS INDEX: 36.95 KG/M2 | TEMPERATURE: 96.8 F

## 2018-12-14 DIAGNOSIS — E11.65 UNCONTROLLED TYPE 2 DIABETES MELLITUS WITH HYPERGLYCEMIA (H): Primary | ICD-10-CM

## 2018-12-14 DIAGNOSIS — E78.5 HYPERLIPIDEMIA LDL GOAL <100: ICD-10-CM

## 2018-12-14 LAB
CHOLEST SERPL-MCNC: 118 MG/DL
HBA1C MFR BLD: 8.6 % (ref 0–5.6)
HDLC SERPL-MCNC: 68 MG/DL
LDLC SERPL CALC-MCNC: 25 MG/DL
NONHDLC SERPL-MCNC: 50 MG/DL
TRIGL SERPL-MCNC: 124 MG/DL

## 2018-12-14 PROCEDURE — 36415 COLL VENOUS BLD VENIPUNCTURE: CPT | Performed by: INTERNAL MEDICINE

## 2018-12-14 PROCEDURE — 99214 OFFICE O/P EST MOD 30 MIN: CPT | Performed by: INTERNAL MEDICINE

## 2018-12-14 PROCEDURE — 83036 HEMOGLOBIN GLYCOSYLATED A1C: CPT | Performed by: INTERNAL MEDICINE

## 2018-12-14 PROCEDURE — 80061 LIPID PANEL: CPT | Performed by: INTERNAL MEDICINE

## 2018-12-14 NOTE — PATIENT INSTRUCTIONS
Make appointment(s) for:   -- get labs done today.   -- wellness visit in 6 months.       Ask your psychiatrist if you can take 1 extra Abilify on the nights you cannot sleep.

## 2018-12-14 NOTE — PROGRESS NOTES
SUBJECTIVE:   Holly Muir is a 63 year old female who presents to clinic today for the following health issues:      Diabetes Follow-up      Patient is checking blood sugars: downloaded    Diabetic concerns: None     Symptoms of hypoglycemia (low blood sugar): none but feels funny when low     Paresthesias (numbness or burning in feet) or sores: No     Date of last diabetic eye exam: DUE will schedule    BP Readings from Last 2 Encounters:   12/14/18 106/64   11/14/18 131/68     Hemoglobin A1C (%)   Date Value   12/14/2018 8.6 (H)   08/08/2018 10.3 (H)     LDL Cholesterol Calculated (mg/dL)   Date Value   12/14/2018 25   12/19/2016 21       Diabetes Management Resources  Hypertension Follow-up      Outpatient blood pressures are being checked at home.  Results are unknown but good per pt. .    Low Salt Diet: no added salt      Amount of exercise or physical activity: None    Problems taking medications regularly: No    Medication side effects: none    Diet: regular (no restrictions), low salt and carbohydrate counting    Last 2 nights she could sleep, then ate during the night, hallucinates when she does not sleep at night. She was given abilify to use at bedtime. She has good nights and sometimes it does not help. Not seeing psychiatrist for another 2 months.     She sees our endocrinologist for diabetes, has appt next week.     ROS:  Constitutional, HEENT, cardiovascular, pulmonary, gi and gu systems are negative, except as otherwise noted.         Current Outpatient Medications on File Prior to Visit:  ARIPiprazole (ABILIFY) 20 MG tablet Take 1 tablet (20 mg) by mouth daily Along with 5mg tab to total 25mg.  Prescribed by Dr. Isael Jiménez at [facility]   ARIPiprazole (ABILIFY) 5 MG tablet Take 1 tablet (5 mg) by mouth At Bedtime Along with 5mg tab to total 25mg.  Prescribed by Dr. Isael Jiménez at [facility]   aspirin ([facility] ASPIRIN ADULT LOW DOSE) 81 MG chewable tablet TAKE 1 TABLET (81  MG) BY MOUTH DAILY   BASAGLAR 100 UNIT/ML injection Take 55 units daily.   buPROPion (WELLBUTRIN XL) 150 MG 24 hr tablet TAKE 3 TABLETS (450 MG) BY MOUTH EVERY MORNING   busPIRone HCl (BUSPAR) 30 MG tablet TAKE 1 TABLET BY MOUTH TWICE A DAY   Cholecalciferol (VITAMIN D) 2000 units tablet Take 2,000 Units by mouth daily   enalapril (VASOTEC) 2.5 MG tablet Take 1 tablet (2.5 mg) by mouth daily   gabapentin (NEURONTIN) 300 MG capsule TAKE ONE CAPSULE BY MOUTH TWICE A DAY TAKE 2 CAPSULES BY MOUTH IN THE EVENING   ibuprofen (ADVIL/MOTRIN) 800 MG tablet Take 1 tablet (800 mg) by mouth every 8 hours as needed for moderate pain (with food)   insulin aspart (NOVOLOG FLEXPEN) 100 UNIT/ML pen Take as directed with meals. Total daily dose approx 30 units.   metFORMIN (GLUCOPHAGE) 1000 MG tablet TAKE 1 TABLET BY MOUTH TWICE A DAY WITH MEALS   multivitamin, therapeutic with minerals (MULTI-VITAMIN) TABS tablet Take 1 tablet by mouth daily   nystatin (MYCOSTATIN) 490805 UNIT/GM POWD Apply 1 dose topically 3 times daily as needed   omeprazole (PRILOSEC) 20 MG CR capsule TAKE 2 CAPSULES BY MOUTH DAILY TAKE 30-60 MINUTES BEFORE A MEAL.   PARoxetine (PAXIL) 20 MG tablet TAKE 2 TABLETS AT BEDTIME (Prescribed by Dr. Isael Jiménez at [facility])   propranolol (INDERAL LA) 60 MG 24 hr capsule Take 1 capsule (60 mg) by mouth See Admin Instructions Take one po q day.  May repeat dose if tremor persists.   simvastatin (ZOCOR) 20 MG tablet TAKE 1 TABLET (20 MG) BY MOUTH AT BEDTIME (DUE FOR OFFICE VISIT FOR FUTURE REFILLS)   traMADol (ULTRAM) 50 MG tablet TAKE 1 TABLET BY MOUTH 2 TIMES A DAY AS NEEDED FOR SEVERE PAIN   BASAGLAR 100 UNIT/ML injection Inject 55 Units Subcutaneous At Bedtime   blood glucose monitoring (ACCU-CHEK CELIA PLUS) meter device kit Use to test blood sugars 4 times daily or as directed.   blood glucose monitoring (ACCU-CHEK CELIA) test strip Use to test blood sugars 6 times daily or as directed.   blood glucose  monitoring (ACCU-CHEK FASTCLIX) lancets Use to test blood sugar 6 times daily or as directed.   [] candida albicans skin test injection Inject 0.1 mLs into the skin once for 1 dose   insulin pen needle 31G X 5 MM Use four times 4 day times a day   order for DME Equipment being ordered: grab bar for bath tub/shower.   order for DME Equipment being ordered: railing for stairs at home.   order for DME Equipment being ordered: Knee walker    order for DME by Device route continuous Air walker -  Use as instructed   ORDER FOR DME Equipment being ordered:  Ostomy supplies.  Drain Pouch by [employer] #.    Also needs Barrier by [employer] #.   ORDER FOR DME Equipment being ordered: Cane   pioglitazone (ACTOS) 30 MG tablet Take 1 tablet (30 mg) by mouth daily   UNIVERSAL REMOVER WIPES EX MISC uses to remove barrier from colostomy bag at time of change     No current facility-administered medications on file prior to visit.        Patient Active Problem List   Diagnosis     Hyperlipidemia LDL goal <100     Severe major depression with psychotic features (H)     History of cervical cancer     S/P colostomy (H)     Mixed incontinence urge and stress     Memory loss     Type 2 diabetes mellitus, uncontrolled A1C goal <8%     Obesity     Pain in joint, hand     Trigger finger, acquired     Synovitis and tenosynovitis     Dermatochalasis     Presbyopia     OCD (obsessive compulsive disorder)     HTN, goal below 140/90     Cataracts, both eyes     Health Care Home     Lumbago     Sprain of lumbar region     Generalized osteoarthrosis, unspecified site     Rotator cuff impingement syndrome     Hypoglycemia due to insulin     Encounter for long-term (current) use of insulin (H)     Colostomy, evaluate (H)     Urinary retention     ACP (advance care planning)     Bilateral low back pain without sciatica     Anxiety     Fecal incontinence due to anorectal disorder     Visual hallucination     Type 2 diabetes  mellitus with hyperglycemia (H)     Chronic pain syndrome     Obesity (BMI 35.0-39.9) with comorbidity (H)     Past Surgical History:   Procedure Laterality Date     ARTHROSCOPY KNEE RT/LT      LT     BREAST LUMPECTOMY, RT/LT      LT-benign      C EXCIS PTERYGIUM  10/08    [facility]     C STOMACH SURGERY PROCEDURE UNLISTED       COLONOSCOPY  5/10/2012    Procedure:COLONOSCOPY; screening colonoscopy; Surgeon:MILLA VEGA; Location:[facility] OR     COLOSTOMY       COLOSTOMY       HC COLONOSCOPY THRU STOMA, DIAGNOSTIC      normal per report-no records available-records destroyed     HYSTERECTOMY, PAP NO LONGER INDICATED      done in [place]-cervical cancer     IMPLANT STIMULATOR SACRAL NERVE STAGE ONE Right 3/10/2015    Procedure: IMPLANT STIMULATOR SACRAL NERVE STAGE ONE;  Surgeon: Teodora Yusuf MD;  Location: [facility] OR     IMPLANT STIMULATOR SACRAL NERVE STAGE TWO Right 3/31/2015    Procedure: IMPLANT STIMULATOR SACRAL NERVE STAGE TWO;  Surgeon: Teodora Yusuf MD;  Location: [facility] OR     KNEE SURGERY       SALPINGO OOPHORECTOMY,R/L/JENNIFER      Salpingo Oophorectomy, RT/LT/JENNIFER       Social History     Tobacco Use     Smoking status: Former Smoker     Last attempt to quit: 2016     Years since quittin.3     Smokeless tobacco: Never Used   Substance Use Topics     Alcohol use: Yes     Alcohol/week: 0.0 oz     Comment: social occasions     Family History   Problem Relation Age of Onset     Diabetes Mother      Cerebrovascular Disease Mother      Diabetes Father      Hypertension Father      Cancer Maternal Grandfather      Cancer Paternal Grandfather      Diabetes Brother      Diabetes Sister      Thyroid Disease Daughter      Thyroid Disease Sister      Multiple Sclerosis Daughter      Glaucoma No family hx of      Macular Degeneration No family hx of              Problem list, Medication list, Allergies, and Medical/Social/Surgical histories reviewed in [facility] and updated as appropriate.    OBJECTIVE:                                                     /64   Pulse 71   Temp 96.8  F (36  C) (Temporal)   Wt 91.6 kg (202 lb)   SpO2 100%   BMI 36.95 kg/m      GENERAL: healthy, alert and no distress      Diagnostic test results:  Results for orders placed or performed in visit on 12/14/18   Lipid panel reflex to direct LDL Fasting   Result Value Ref Range    Cholesterol 118 <200 mg/dL    Triglycerides 124 <150 mg/dL    HDL Cholesterol 68 >49 mg/dL    LDL Cholesterol Calculated 25 <100 mg/dL    Non HDL Cholesterol 50 <130 mg/dL   Hemoglobin A1c   Result Value Ref Range    Hemoglobin A1C 8.6 (H) 0 - 5.6 %     *Note: Due to a large number of results and/or encounters for the requested time period, some results have not been displayed. A complete set of results can be found in Results Review.         ASSESSMENT/PLAN:                                                      63 year old female with the following diagnoses and treatment plan:      ICD-10-CM    1. Uncontrolled type 2 diabetes mellitus with hyperglycemia (H) E11.65 Hemoglobin A1c   2. Hyperlipidemia LDL goal <100 E78.5 Lipid panel reflex to direct LDL Fasting       -- she has made significant progress in A1c down to 8.6. See endocrine for further adjustment.  -- LDL at goal.  -- insomnia: given Abilify 5 mg at bedtime by psychiatry. Effective on most days. Possibly can use one extra dose on days she has trouble sleeping. She will ask her psychiatrist.  -- return for wellness visit in 6 months.     Will call or return to clinic if worsening or symptoms not improving as discussed.  See Patient Instructions.      Tiffany King MD-PhD  [facility] [place]    (Note: Chart documentation was done in part with Dragon Voice Recognition software. Although reviewed after completion, some word and grammatical errors may remain.)

## 2018-12-18 DIAGNOSIS — E78.00 HIGH CHOLESTEROL: ICD-10-CM

## 2018-12-18 NOTE — TELEPHONE ENCOUNTER
Simvastatin Pharm requests 90 day script  Last Written Prescription Date:  7/16/18  Last Fill Quantity: 30,  # refills: 0   Last office visit: 12/14/2018 with prescribing provider:  laya   Future Office Visit:   Next 5 appointments (look out 90 days)    Dec 21, 2018  2:15 PM CST  Return Visit with Elizabeth Medrano MD  [facility]) [address] [place] [id number]  [phone]   Randy 10, 2019  2:00 PM CST  Return Visit with Rossy Harding MD  [facility]) [address] [place] [id number] [phone]

## 2018-12-19 ENCOUNTER — DOCUMENTATION ONLY (OUTPATIENT)
Dept: PEDIATRICS | Facility: CLINIC | Age: 63
End: 2018-12-19

## 2018-12-19 RX ORDER — SIMVASTATIN 20 MG
TABLET ORAL
Qty: 90 TABLET | Refills: 1 | Status: SHIPPED | OUTPATIENT
Start: 2018-12-19 | End: 2019-06-27

## 2018-12-19 NOTE — TELEPHONE ENCOUNTER
Disp Refills Start End KARLA   simvastatin (ZOCOR) 20 MG tablet 30 tablet 0 7/16/2018  No   Sig: TAKE 1 TABLET (20 MG) BY MOUTH AT BEDTIME (DUE FOR OFFICE VISIT FOR FUTURE REFILLS)     Last OV with Dr. King: 12/14/18 -- wellness visit in 6 months.     Next 5 appointments (look out 90 days)    Dec 21, 2018  2:15 PM CST  Return Visit with Elizabeth Medrano MD  [facility]) [address] [place] [id number] [phone]   Randy 10, 2019  2:00 PM CST  Return Visit with Rossy Harding MD  [facility]) [address] [place] [id number] [phone]        LDL Cholesterol Calculated   Date Value Ref Range Status   12/14/2018 25 <100 mg/dL Final     Comment:     Desirable:       <100 mg/dl     Creatinine   Date Value Ref Range Status   08/08/2018 0.74 0.52 - 1.04 mg/dL Final     Refilled per [facility] protocol.    Helena Dickson RN

## 2018-12-20 NOTE — PROGRESS NOTES
[facility] utilizes an encounter to take the place of a direct phone call to your office. Please take a moment to review the below request. Please reply or route message to author of this encounter.  Message will act as a verbal OK of orders requested below. Thank you.    ORDER  Requesting continued weekly SN visits at [facility]    MD SUMMARY/PLAN OF CARE    MD Summary-Recert  Pt is a 63 yr old female who continues with [facility] Services for Disease Management/Education and assistance with weekly med set up and assessment of compliance and refills.  . She takes medical transport to all appointments or family will take if she is not able to arrange transport. She continues to attend medical appts as scheduled and updates SN of any medication changes and has been bringing her after visit summary for nurse review.  She has demonstrated improved med compliance with a rare missed dose.  Her MH status has improved, she denies further hallucinations since increased abilify dose.  She does have some insomnia which is new, happening one to two times per week which she will address with psych next visit.   Pt has ostomy which she manages independently, pt also has implant  which has resulted in her remaining continent of urine most of the time, recently she has reported some urinary incontinence and is not sure if her implant needs to be adjusted. She reports chronic back pain with no changes in regimen the last 60 days.  She had no falls this last cert period, last fall 6/18/18.  VSS. LSC throughout, reports productive cough.  A/O x 3 with forgetfulness, speech is clear, she is pleasant and cooperative with in home nurse visits.  Pt is following Psych doctor Tino at [facility] and  ass. Pt is also seeing a therapist and pt states this has helped with depression. Education on ADA diet and carb counting,  she follows up with DM education as directed, meets with Dr Medrano on 12/21/18.  Client has most  recently been able to state a correlation with her high blood sugar readings and her oral intake and is trying to reduce the carbs she is taking in, she states appropriate sliding scale insulin coverage.     BACKGROUND//Primary medical diagnosis is Severe major depression with psychotic features, pt also suffers from occasional memory loss and is Type 2 diabetic.  ANALYSIS// High risk for hospitalization r/t poorly controlled diabetes and history of noncompliance/forgetfulness with taking high risk medications correctly.  She will require skilled interventions to manage her health at home. RECOMMENDATION// Recommend SN visits for Medication management/education, pt/CG education on ADA diet, skin integrity assessments/teaching.  Chronic pain management/education. She will require skilled interventions to manage her health at home. Expected length of home care is  ongoing HC need for med mgmt.  PT eval for balance, fall prevention, safety    Patient/caregiver educated on security, storage and disposal of controlled substances per handout.    Emergency Plan created and reviewed with patient and left in home    Patient Identified Goal// to continue to take my medications so I feel good     EVELYN Orellana  [phone]  [email or web address]

## 2018-12-24 NOTE — RESULT ENCOUNTER NOTE
Send Letter with the following comment:    Dear Holly,   Your recent lab results showed the following:  --  Lipid panel is normal now.  -- A1c is much improved. Continue follow up with endocrine.     Please call or make an appointment if you have further questions.     Tiffany King MD-PhD

## 2019-01-09 DIAGNOSIS — F32.3 SEVERE MAJOR DEPRESSION WITH PSYCHOTIC FEATURES (H): ICD-10-CM

## 2019-01-10 ENCOUNTER — OFFICE VISIT (OUTPATIENT)
Dept: DERMATOLOGY | Facility: CLINIC | Age: 64
End: 2019-01-10
Payer: MEDICARE

## 2019-01-10 DIAGNOSIS — B07.8 OTHER VIRAL WARTS: Primary | ICD-10-CM

## 2019-01-10 PROCEDURE — 17110 DESTRUCTION B9 LES UP TO 14: CPT | Performed by: DERMATOLOGY

## 2019-01-10 PROCEDURE — 99207 ZZC NO CHARGE INJECTABLE MED/DRUG: CPT | Performed by: DERMATOLOGY

## 2019-01-10 PROCEDURE — 11900 INJECT SKIN LESIONS </W 7: CPT | Mod: 59 | Performed by: DERMATOLOGY

## 2019-01-10 RX ORDER — CANDIDA ALBICANS 1000 [PNU]/ML
0.1 INJECTION, SOLUTION INTRADERMAL ONCE
Qty: 1 ML | Refills: 0 | OUTPATIENT
Start: 2019-01-10 | End: 2019-06-27

## 2019-01-10 RX ORDER — CANDIDA ALBICANS 1000 [PNU]/ML
0.1 INJECTION, SOLUTION INTRADERMAL ONCE
Status: CANCELLED | OUTPATIENT
Start: 2019-01-10

## 2019-01-10 NOTE — NURSING NOTE
[employer] Record given by Dr. Harding    Drug Name: Candida  Dose: 0.2mL  Route administered: ID  NDC #: Candida ([id number])  Amount of waste(mL): 0  Reason for waste: NA    LOT #:   SITE: fourth finger left hand and thumb right hand  : [employer]  EXPIRATION DATE: 1-23-21    Edith Marks CMA

## 2019-01-10 NOTE — NURSING NOTE
Holly Muir's goals for this visit include:   Chief Complaint   Patient presents with     Wart     bilateral hands       She requests these members of her care team be copied on today's visit information: NO    PCP: Tiffany King    Referring Provider:  No referring provider defined for this encounter.    There were no vitals taken for this visit.    Do you need any medication refills at today's visit? NO  .  Edith Marks CMA

## 2019-01-10 NOTE — PROGRESS NOTES
[facility] Dermatology Note      Dermatology Problem List:  1. Verrucous vulgaris x3  -s/p cryotherapy and candida injections 12/13/2018, 1/10/19  -home treatments with salicyclic acid    Encounter Date: Randy 10, 2019    CC:  Chief Complaint   Patient presents with     Wart     bilateral hands         History of Present Illness:  Ms. Holly Muir is a 63 year old female who presents as a follow for wart treatment. She was last seen by Dr. Harding 12/13/2018 when they were treated with cryotherapy and candida. One wart resolved, two are remaining. She has not bought Wart Stick on Amazon yet. No home treatments at present. She does admit to picking at warts frequently. No other concerns addressed today.    Past Medical History:   Patient Active Problem List   Diagnosis     Hyperlipidemia LDL goal <100     Severe major depression with psychotic features (H)     History of cervical cancer     S/P colostomy (H)     Mixed incontinence urge and stress     Memory loss     Type 2 diabetes mellitus, uncontrolled A1C goal <8%     Obesity     Pain in joint, hand     Trigger finger, acquired     Synovitis and tenosynovitis     Dermatochalasis     Presbyopia     OCD (obsessive compulsive disorder)     HTN, goal below 140/90     Cataracts, both eyes     Health Care Home     Lumbago     Sprain of lumbar region     Generalized osteoarthrosis, unspecified site     Rotator cuff impingement syndrome     Hypoglycemia due to insulin     Encounter for long-term (current) use of insulin (H)     Colostomy, evaluate (H)     Urinary retention     ACP (advance care planning)     Bilateral low back pain without sciatica     Anxiety     Fecal incontinence due to anorectal disorder     Visual hallucination     Type 2 diabetes mellitus with hyperglycemia (H)     Chronic pain syndrome     Obesity (BMI 35.0-39.9) with comorbidity (H)     Past Medical History:   Diagnosis Date     Cataracts, both eyes 5/8/2013     Cervical  cancer (H)      Diabetes      Diabetic retinopathy (H)      HTN      Inflammatory arthritis      Major depression      Memory loss      Nephropathy      OA (osteoarthritis) of knee 9/11/2013     Other and unspecified hyperlipidemia      Pterygium eye      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     Past Surgical History:   Procedure Laterality Date     ARTHROSCOPY KNEE RT/LT      LT     BREAST LUMPECTOMY, RT/LT      LT-benign      C EXCIS PTERYGIUM  10/08    [facility]     C STOMACH SURGERY PROCEDURE UNLISTED       COLONOSCOPY  5/10/2012    Procedure:COLONOSCOPY; screening colonoscopy; Surgeon:MILLA VEGA; [facility]     COLOSTOMY  2000     COLOSTOMY       HC COLONOSCOPY THRU STOMA, DIAGNOSTIC  2002    normal per report-no records available-records destroyed     HYSTERECTOMY, PAP NO LONGER INDICATED      done in [place]-cervical cancer     IMPLANT STIMULATOR SACRAL NERVE STAGE ONE Right 3/10/2015    Procedure: IMPLANT STIMULATOR SACRAL NERVE STAGE ONE;  Surgeon: Teodora Yusuf MD;  [facility]     IMPLANT STIMULATOR SACRAL NERVE STAGE TWO Right 3/31/2015    Procedure: IMPLANT STIMULATOR SACRAL NERVE STAGE TWO;  Surgeon: Teodora Yusuf MD;  [facility]     KNEE SURGERY       SALPINGO OOPHORECTOMY,R/L/JENNIFER      Salpingo Oophorectomy, RT/LT/JENNIFER       Social History:  Patient reports that she quit smoking about 2 years ago. she has never used smokeless tobacco. She reports that she drinks alcohol. She reports that she does not use drugs. Patient is on disability. She has grandchildren.       Family History:  Family History   Problem Relation Age of Onset     Diabetes Mother      Cerebrovascular Disease Mother      Diabetes Father      Hypertension Father      Cancer Maternal Grandfather      Cancer Paternal Grandfather      Diabetes Brother      Diabetes Sister      Thyroid Disease Daughter      Thyroid Disease Sister      Multiple Sclerosis Daughter      Glaucoma No family hx of       Macular Degeneration No family hx of        Medications:  Current Outpatient Medications   Medication Sig Dispense Refill     ARIPiprazole (ABILIFY) 20 MG tablet Take 1 tablet (20 mg) by mouth daily Along with 5mg tab to total 25mg.  Prescribed by Dr. Isael Jiménez at [facility]       ARIPiprazole (ABILIFY) 5 MG tablet Take 1 tablet (5 mg) by mouth At Bedtime Along with 5mg tab to total 25mg.  Prescribed by Dr. Isael Jiménez at [facility] 30 tablet 1     aspirin ([employer] ASPIRIN ADULT LOW DOSE) 81 MG chewable tablet TAKE 1 TABLET (81 MG) BY MOUTH DAILY 90 tablet 3     BASAGLAR 100 UNIT/ML injection Take 55 units daily. 60 mL 0     BASAGLAR 100 UNIT/ML injection Inject 55 Units Subcutaneous At Bedtime 16.5 mL 3     blood glucose monitoring (ACCU-CHEK CELIA PLUS) meter device kit Use to test blood sugars 4 times daily or as directed. 1 kit 0     blood glucose monitoring (ACCU-CHEK CELIA) test strip Use to test blood sugars 6 times daily or as directed. 600 each 1     blood glucose monitoring (ACCU-CHEK FASTCLIX) lancets Use to test blood sugar 6 times daily or as directed. 510 each 1     buPROPion (WELLBUTRIN XL) 150 MG 24 hr tablet TAKE 3 TABLETS (450 MG) BY MOUTH EVERY MORNING       busPIRone HCl (BUSPAR) 30 MG tablet TAKE 1 TABLET BY MOUTH TWICE A  tablet 1     Cholecalciferol (VITAMIN D) 2000 units tablet Take 2,000 Units by mouth daily 100 tablet 3     enalapril (VASOTEC) 2.5 MG tablet Take 1 tablet (2.5 mg) by mouth daily 90 tablet 3     gabapentin (NEURONTIN) 300 MG capsule TAKE ONE CAPSULE BY MOUTH TWICE A DAY TAKE 2 CAPSULES BY MOUTH IN THE EVENING 360 capsule 1     ibuprofen (ADVIL/MOTRIN) 800 MG tablet Take 1 tablet (800 mg) by mouth every 8 hours as needed for moderate pain (with food) 30 tablet 0     insulin aspart (NOVOLOG FLEXPEN) 100 UNIT/ML pen Take as directed with meals. Total daily dose approx 30 units. 30 mL 0     insulin pen needle 31G X 5 MM Use four times 4 day times a  day 400 each 1     metFORMIN (GLUCOPHAGE) 1000 MG tablet TAKE 1 TABLET BY MOUTH TWICE A DAY WITH MEALS 180 tablet 0     multivitamin, therapeutic with minerals (MULTI-VITAMIN) TABS tablet Take 1 tablet by mouth daily 30 each 11     nystatin (MYCOSTATIN) 739759 UNIT/GM POWD Apply 1 dose topically 3 times daily as needed 60 g 3     omeprazole (PRILOSEC) 20 MG CR capsule TAKE 2 CAPSULES BY MOUTH DAILY TAKE 30-60 MINUTES BEFORE A MEAL. 180 capsule 2     order for DME Equipment being ordered: grab bar for bath tub/shower. 1 Units 0     order for DME Equipment being ordered: railing for stairs at home. 1 Units 0     order for DME Equipment being ordered: Knee walker  1 Device 0     order for DME by Device route continuous Air walker -  Use as instructed 1 Device 0     ORDER FOR DME Equipment being ordered:  Ostomy supplies.  Drain Pouch by "[employer] #.    Also needs Barrier by "[employer] #. 3 Month 4     ORDER FOR DME Equipment being ordered: Cane 1 Units 0     PARoxetine (PAXIL) 20 MG tablet TAKE 2 TABLETS AT BEDTIME (Prescribed by Dr. Isael Jiménez at [facility]) 30 tablet      pioglitazone (ACTOS) 30 MG tablet Take 1 tablet (30 mg) by mouth daily 90 tablet 1     propranolol (INDERAL LA) 60 MG 24 hr capsule Take 1 capsule (60 mg) by mouth See Admin Instructions Take one po q day.  May repeat dose if tremor persists. 60 capsule 11     simvastatin (ZOCOR) 20 MG tablet TAKE 1 TABLET (20 MG) BY MOUTH AT BEDTIME (DUE FOR OFFICE VISIT FOR FUTURE REFILLS) 90 tablet 1     traMADol (ULTRAM) 50 MG tablet TAKE 1 TABLET BY MOUTH 2 TIMES A DAY AS NEEDED FOR SEVERE PAIN 60 tablet 1     UNIVERSAL REMOVER WIPES EX MISC uses to remove barrier from colostomy bag at time of change 1 Each 3       Allergies   Allergen Reactions     Morphine Sulfate Itching       Review of Systems:  -Constitutional: Patient is otherwise feeling well, in usual state of health.   -Skin: As above in HPI. No additional skin  concerns.    Physical exam:  Vitals: There were no vitals taken for this visit.  GEN: This is a well developed, well-nourished female in no acute distress, in a pleasant mood.    SKIN: Sun-exposed skin, which includes the head/face, neck, both arms, digits, and/or nails was examined.   - verrucous papule on the right palmar thumb  - verrucous plaque left ring finger medial aspect of nail fold  - no other lesions of concern on areas examined.       Impression/Plan:  1. Viral warts: Discussed the viral nature of warts. Explained that there are many treatment options and none of them work consistently for every wart. At this time, I recommended a combination of home and in office treatments. In office monthly, we will do cryotherapy and candida injections (expectations and risks discussed in detail).  In between visits, will have patient use over the counter salicylic acid nightly. This is treatment #2.     Cryotherapy procedure note: After verbal consent and discussion of risks and benefits including but no limited to dyspigmentation/scar, blister, and pain, 2 was(were) treated with 1-2mm freeze border for 3 cycles with liquid nitrogen. Post cryotherapy instructions were provided.    After cleansing with alcohol, a total of 0.3 cc of candida antigen was injected into 2 suitable lesions. The patient tolerated the procedure well.        Follow-up in 4 weeks, earlier for new or changing lesions.       Staff Involved:  Scribe/Staff    Scribe Disclosure  I, Carmela Gage, am serving as a scribe to document services personally performed by Dr. Rossy Harding MD, based on data collection and the provider's statements to me.     Provider Disclosure:   The documentation recorded by the scribe accurately reflects the services I personally performed and the decisions made by me.    Rossy Harding MD    Department of Dermatology  [facility]: Phone:  [phone], Fax:[phone]  [facility]: Phone: [phone], Fax: [phone]

## 2019-01-10 NOTE — PATIENT INSTRUCTIONS
Start at home salicylic acid treatment, a week after cryotherapy treatment. Apply Wart Stick 40% (found on Amazon) at bedtime, cover with a Bandaid. Remove the Band-aid in the morning.       Cryotherapy    What is it?    Use of a very cold liquid, such as liquid nitrogen, to freeze and destroy abnormal skin cells that need to be removed    What should I expect?    Tenderness and redness    A small blister that might grow and fill with dark purple blood. There may be crusting.    More than one treatment may be needed if the lesions do not go away.    How do I care for the treated area?    Gently wash the area with your hands when bathing.    Use a thin layer of Vaseline to help with healing. You may use a Band-Aid.     The area should heal within 7-10 days and may leave behind a pink or lighter color.     Do not use an antibiotic or Neosporin ointment.     You may take acetaminophen (Tylenol) for pain.     Call your Doctor if you have:    Severe pain    Signs of infection (warmth, redness, cloudy yellow drainage, and or a bad smell)    Questions or concerns    Who should I call with questions?       [facility]: [phone]       [facility]: [phone]       For urgent needs outside of business hours call the [facility] at [phone]        and ask for the dermatology resident on call

## 2019-01-10 NOTE — LETTER
1/10/2019         RE: Holly Muir  [address] [address] [facility] [id number]        Dear Colleague,    Thank you for referring your patient, Holly Muir, to the [facility]. Please see a copy of my visit note below.        [facility] Dermatology Note      Dermatology Problem List:  1. Verrucous vulgaris x3  -s/p cryotherapy and candida injections 12/13/2018, 1/10/19  -home treatments with salicyclic acid    Encounter Date: Randy 10, 2019    CC:  Chief Complaint   Patient presents with     Wart     bilateral hands         History of Present Illness:  Ms. Holly Muir is a 63 year old female who presents as a follow for wart treatment. She was last seen by Dr. Harding 12/13/2018 when they were treated with cryotherapy and candida. One wart resolved, two are remaining. She has not bought Wart Stick on Amazon yet. No home treatments at present. She does admit to picking at warts frequently. No other concerns addressed today.    Past Medical History:   Patient Active Problem List   Diagnosis     Hyperlipidemia LDL goal <100     Severe major depression with psychotic features (H)     History of cervical cancer     S/P colostomy (H)     Mixed incontinence urge and stress     Memory loss     Type 2 diabetes mellitus, uncontrolled A1C goal <8%     Obesity     Pain in joint, hand     Trigger finger, acquired     Synovitis and tenosynovitis     Dermatochalasis     Presbyopia     OCD (obsessive compulsive disorder)     HTN, goal below 140/90     Cataracts, both eyes     Health Care Home     Lumbago     Sprain of lumbar region     Generalized osteoarthrosis, unspecified site     Rotator cuff impingement syndrome     Hypoglycemia due to insulin     Encounter for long-term (current) use of insulin (H)     Colostomy, evaluate (H)     Urinary retention     ACP (advance care planning)     Bilateral low back pain without sciatica     Anxiety     Fecal incontinence due to  anorectal disorder     Visual hallucination     Type 2 diabetes mellitus with hyperglycemia (H)     Chronic pain syndrome     Obesity (BMI 35.0-39.9) with comorbidity (H)     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     Past Surgical History:   Procedure Laterality Date     ARTHROSCOPY KNEE RT/LT      LT     BREAST LUMPECTOMY, RT/LT      LT-benign      C EXCIS PTERYGIUM  10/08    [facility]     C STOMACH SURGERY PROCEDURE UNLISTED       COLONOSCOPY  5/10/2012    Procedure:COLONOSCOPY; screening colonoscopy; Surgeon:MILLA VEGA; Location:[facility] OR     COLOSTOMY  2000     COLOSTOMY       HC COLONOSCOPY THRU STOMA, DIAGNOSTIC  2002    normal per report-no records available-records destroyed     HYSTERECTOMY, PAP NO LONGER INDICATED      done in [place]-cervical cancer     IMPLANT STIMULATOR SACRAL NERVE STAGE ONE Right 3/10/2015    Procedure: IMPLANT STIMULATOR SACRAL NERVE STAGE ONE;  Surgeon: Teodora Yusuf MD;  [facility]     IMPLANT STIMULATOR SACRAL NERVE STAGE TWO Right 3/31/2015    Procedure: IMPLANT STIMULATOR SACRAL NERVE STAGE TWO;  Surgeon: Teodora Yusuf MD;  [facility] OR     KNEE SURGERY       SALPINGO OOPHORECTOMY,R/L/JENNIFER      Salpingo Oophorectomy, RT/LT/JENNIFER       Social History:  Patient reports that she quit smoking about 2 years ago. she has never used smokeless tobacco. She reports that she drinks alcohol. She reports that she does not use drugs. Patient is on disability. She has grandchildren.       Family History:  Family History   Problem Relation Age of Onset     Diabetes Mother      Cerebrovascular Disease Mother      Diabetes Father       Hypertension Father      Cancer Maternal Grandfather      Cancer Paternal Grandfather      Diabetes Brother      Diabetes Sister      Thyroid Disease Daughter      Thyroid Disease Sister      Multiple Sclerosis Daughter      Glaucoma No family hx of      Macular Degeneration No family hx of        Medications:  Current Outpatient Medications   Medication Sig Dispense Refill     ARIPiprazole (ABILIFY) 20 MG tablet Take 1 tablet (20 mg) by mouth daily Along with 5mg tab to total 25mg.  Prescribed by Dr. Isael Jiménez at [facility]       ARIPiprazole (ABILIFY) 5 MG tablet Take 1 tablet (5 mg) by mouth At Bedtime Along with 5mg tab to total 25mg.  Prescribed by Dr. Isael Jiménez at [facility] 30 tablet 1     aspirin ([employer]S ASPIRIN ADULT LOW DOSE) 81 MG chewable tablet TAKE 1 TABLET (81 MG) BY MOUTH DAILY 90 tablet 3     BASAGLAR 100 UNIT/ML injection Take 55 units daily. 60 mL 0     BASAGLAR 100 UNIT/ML injection Inject 55 Units Subcutaneous At Bedtime 16.5 mL 3     blood glucose monitoring (ACCU-CHEK CELIA PLUS) meter device kit Use to test blood sugars 4 times daily or as directed. 1 kit 0     blood glucose monitoring (ACCU-CHEK CELIA) test strip Use to test blood sugars 6 times daily or as directed. 600 each 1     blood glucose monitoring (ACCU-CHEK FASTCLIX) lancets Use to test blood sugar 6 times daily or as directed. 510 each 1     buPROPion (WELLBUTRIN XL) 150 MG 24 hr tablet TAKE 3 TABLETS (450 MG) BY MOUTH EVERY MORNING       busPIRone HCl (BUSPAR) 30 MG tablet TAKE 1 TABLET BY MOUTH TWICE A  tablet 1     Cholecalciferol (VITAMIN D) 2000 units tablet Take 2,000 Units by mouth daily 100 tablet 3     enalapril (VASOTEC) 2.5 MG tablet Take 1 tablet (2.5 mg) by mouth daily 90 tablet 3     gabapentin (NEURONTIN) 300 MG capsule TAKE ONE CAPSULE BY MOUTH TWICE A DAY TAKE 2 CAPSULES BY MOUTH IN THE EVENING 360 capsule 1     ibuprofen (ADVIL/MOTRIN) 800 MG tablet Take 1 tablet (800 mg) by  mouth every 8 hours as needed for moderate pain (with food) 30 tablet 0     insulin aspart (NOVOLOG FLEXPEN) 100 UNIT/ML pen Take as directed with meals. Total daily dose approx 30 units. 30 mL 0     insulin pen needle 31G X 5 MM Use four times 4 day times a day 400 each 1     metFORMIN (GLUCOPHAGE) 1000 MG tablet TAKE 1 TABLET BY MOUTH TWICE A DAY WITH MEALS 180 tablet 0     multivitamin, therapeutic with minerals (MULTI-VITAMIN) TABS tablet Take 1 tablet by mouth daily 30 each 11     nystatin (MYCOSTATIN) 548688 UNIT/GM POWD Apply 1 dose topically 3 times daily as needed 60 g 3     omeprazole (PRILOSEC) 20 MG CR capsule TAKE 2 CAPSULES BY MOUTH DAILY TAKE 30-60 MINUTES BEFORE A MEAL. 180 capsule 2     order for DME Equipment being ordered: grab bar for bath tub/shower. 1 Units 0     order for DME Equipment being ordered: railing for stairs at home. 1 Units 0     order for DME Equipment being ordered: Knee walker  1 Device 0     order for DME by Device route continuous Air walker -  Use as instructed 1 Device 0     ORDER FOR DME Equipment being ordered:  Ostomy supplies.  Drain Pouch by [employer] #.    Also needs Barrier by [employer] #. 3 Month 4     ORDER FOR DME Equipment being ordered: Cane 1 Units 0     PARoxetine (PAXIL) 20 MG tablet TAKE 2 TABLETS AT BEDTIME (Prescribed by Dr. Isael Jiménez at [facility]) 30 tablet      pioglitazone (ACTOS) 30 MG tablet Take 1 tablet (30 mg) by mouth daily 90 tablet 1     propranolol (INDERAL LA) 60 MG 24 hr capsule Take 1 capsule (60 mg) by mouth See Admin Instructions Take one po q day.  May repeat dose if tremor persists. 60 capsule 11     simvastatin (ZOCOR) 20 MG tablet TAKE 1 TABLET (20 MG) BY MOUTH AT BEDTIME (DUE FOR OFFICE VISIT FOR FUTURE REFILLS) 90 tablet 1     traMADol (ULTRAM) 50 MG tablet TAKE 1 TABLET BY MOUTH 2 TIMES A DAY AS NEEDED FOR SEVERE PAIN 60 tablet 1     UNIVERSAL REMOVER WIPES EX MISC uses to remove barrier from colostomy  bag at time of change 1 Each 3       Allergies   Allergen Reactions     Morphine Sulfate Itching       Review of Systems:  -Constitutional: Patient is otherwise feeling well, in usual state of health.   -Skin: As above in HPI. No additional skin concerns.    Physical exam:  Vitals: There were no vitals taken for this visit.  GEN: This is a well developed, well-nourished female in no acute distress, in a pleasant mood.    SKIN: Sun-exposed skin, which includes the head/face, neck, both arms, digits, and/or nails was examined.   - verrucous papule on the right palmar thumb  - verrucous plaque left ring finger medial aspect of nail fold  - no other lesions of concern on areas examined.       Impression/Plan:  1. Viral warts: Discussed the viral nature of warts. Explained that there are many treatment options and none of them work consistently for every wart. At this time, I recommended a combination of home and in office treatments. In office monthly, we will do cryotherapy and candida injections (expectations and risks discussed in detail).  In between visits, will have patient use over the counter salicylic acid nightly. This is treatment #2.     Cryotherapy procedure note: After verbal consent and discussion of risks and benefits including but no limited to dyspigmentation/scar, blister, and pain, 2 was(were) treated with 1-2mm freeze border for 3 cycles with liquid nitrogen. Post cryotherapy instructions were provided.    After cleansing with alcohol, a total of 0.3 cc of candida antigen was injected into 2 suitable lesions. The patient tolerated the procedure well.        Follow-up in 4 weeks, earlier for new or changing lesions.       Staff Involved:  Scribe/Staff    Scribe Disclosure  I, Carmela Gage, am serving as a scribe to document services personally performed by Dr. Rossy Harding MD, based on data collection and the provider's statements to me.     Provider Disclosure:   The documentation recorded by  the scribe accurately reflects the services I personally performed and the decisions made by me.    Rossy Harding MD    Department of Dermatology  [facility]: Phone: [phone], Fax:[phone]  [facility] [place]: Phone: [phone], Fax: [phone]                ain, thank you for allowing me to participate in the care of your patient.        Sincerely,        Rossy Harding MD

## 2019-01-11 RX ORDER — MULTIVITAMIN WITH FOLIC ACID 400 MCG
TABLET ORAL
Qty: 30 TABLET | Refills: 11 | Status: SHIPPED | OUTPATIENT
Start: 2019-01-11 | End: 2020-01-14

## 2019-01-18 DIAGNOSIS — R25.1 TREMOR: ICD-10-CM

## 2019-01-22 RX ORDER — PROPRANOLOL HCL 60 MG
CAPSULE, EXTENDED RELEASE 24HR ORAL
Qty: 60 CAPSULE | Refills: 7 | Status: SHIPPED | OUTPATIENT
Start: 2019-01-22 | End: 2019-01-31

## 2019-01-22 NOTE — TELEPHONE ENCOUNTER
Routing refill request to provider for review/approval because:  Drug interaction warning    Amairani Hernandez RN   [facility]

## 2019-01-29 DIAGNOSIS — M17.10 ARTHRITIS OF KNEE: ICD-10-CM

## 2019-01-29 DIAGNOSIS — M25.562 ARTHRALGIA OF BOTH KNEES: ICD-10-CM

## 2019-01-29 DIAGNOSIS — M25.561 ARTHRALGIA OF BOTH KNEES: ICD-10-CM

## 2019-01-29 NOTE — TELEPHONE ENCOUNTER
Pending Prescriptions:                       Disp   Refills    traMADol (ULTRAM) 50 MG tablet [Pharmacy *60 tab*1            Sig: TAKE 1 TABLET BY MOUTH 2 TIMES A DAY AS NEEDED           FOR SEVERE PAIN      Last Written Prescription Date:  11/16/18  Last Fill Quantity: 60,  # refills: 1   Last office visit: 12/14/2018 with prescribing provider:  Dr. Tiffany King   Future Office Visit:   Next 5 appointments (look out 90 days)    Feb 07, 2019  2:00 PM CST  Return Visit with Rossy Harding MD  [facility]) [address] [place] [id number] [phone]   Feb 08, 2019 10:30 AM CST  Return Visit with Elizabeth Medrano MD  [facility]) [address] [place] [phone]  [phone]           Routing refill request to provider for review/approval because:  Drug not on the G, P or [facility] refill protocol or controlled substance

## 2019-01-29 NOTE — TELEPHONE ENCOUNTER
Tramadol      *New dose* pharm was informed by pts personal nurse, Take 1/2 tablet 3x/day     Requesting #45     Last Written Prescription Date:  11-16-18  Last Fill Quantity: 60,  # refills: 1   Last office visit: 12/14/2018 with prescribing provider:  laya   Future Office Visit:   Next 5 appointments (look out 90 days)    Feb 07, 2019  2:00 PM CST  Return Visit with Rossy Harding MD  [facility]) [address] [place] [phone]   Feb 08, 2019 10:30 AM CST  Return Visit with Elizabeth Medrano MD  [facility]) [address] [place] [phone]

## 2019-01-30 RX ORDER — TRAMADOL HYDROCHLORIDE 50 MG/1
TABLET ORAL
Qty: 60 TABLET | Refills: 1 | Status: SHIPPED | OUTPATIENT
Start: 2019-01-30 | End: 2019-01-31

## 2019-01-30 RX ORDER — TRAMADOL HYDROCHLORIDE 50 MG/1
TABLET ORAL
Qty: 60 TABLET | Refills: 1 | OUTPATIENT
Start: 2019-01-30

## 2019-01-30 NOTE — TELEPHONE ENCOUNTER
Routing refill request to provider for review/approval because:  Drug not on the refill protocol   Asya Howell RN

## 2019-01-31 ENCOUNTER — TELEPHONE (OUTPATIENT)
Dept: PEDIATRICS | Facility: CLINIC | Age: 64
End: 2019-01-31

## 2019-01-31 DIAGNOSIS — R25.1 TREMOR: ICD-10-CM

## 2019-01-31 DIAGNOSIS — M17.10 ARTHRITIS OF KNEE: ICD-10-CM

## 2019-01-31 DIAGNOSIS — M25.561 ARTHRALGIA OF BOTH KNEES: ICD-10-CM

## 2019-01-31 DIAGNOSIS — I10 HYPERTENSION GOAL BP (BLOOD PRESSURE) < 140/90: Primary | ICD-10-CM

## 2019-01-31 DIAGNOSIS — M25.562 ARTHRALGIA OF BOTH KNEES: ICD-10-CM

## 2019-01-31 RX ORDER — TRAMADOL HYDROCHLORIDE 50 MG/1
50 TABLET ORAL 3 TIMES DAILY
Qty: 90 TABLET | Refills: 0 | Status: SHIPPED | OUTPATIENT
Start: 2019-01-31 | End: 2019-01-31

## 2019-01-31 RX ORDER — PROPRANOLOL HCL 60 MG
CAPSULE, EXTENDED RELEASE 24HR ORAL
Qty: 60 CAPSULE | Refills: 5 | Status: SHIPPED | OUTPATIENT
Start: 2019-01-31 | End: 2019-05-27

## 2019-01-31 RX ORDER — PROPRANOLOL HCL 60 MG
CAPSULE, EXTENDED RELEASE 24HR ORAL
Qty: 60 CAPSULE | Refills: 5 | Status: SHIPPED | OUTPATIENT
Start: 2019-01-31 | End: 2019-01-31

## 2019-01-31 RX ORDER — TRAMADOL HYDROCHLORIDE 50 MG/1
TABLET ORAL
Qty: 60 TABLET | Refills: 1 | Status: CANCELLED | OUTPATIENT
Start: 2019-01-31

## 2019-01-31 RX ORDER — RAMIPRIL 2.5 MG/1
2.5 CAPSULE ORAL DAILY
Qty: 30 CAPSULE | Refills: 0 | Status: SHIPPED | OUTPATIENT
Start: 2019-01-31 | End: 2019-01-31

## 2019-01-31 RX ORDER — RAMIPRIL 5 MG/1
5 CAPSULE ORAL DAILY
Qty: 30 CAPSULE | Refills: 0 | Status: SHIPPED | OUTPATIENT
Start: 2019-01-31 | End: 2019-03-08

## 2019-01-31 RX ORDER — TRAMADOL HYDROCHLORIDE 50 MG/1
25 TABLET ORAL 3 TIMES DAILY
Qty: 45 TABLET | Refills: 0 | Status: SHIPPED | OUTPATIENT
Start: 2019-01-31 | End: 2019-04-10

## 2019-01-31 NOTE — TELEPHONE ENCOUNTER
Holly was sent home from [facility] to take Ramipril 5mg not 2.5mg. So is it ok for her to just take 2 tabs of the 2.5mg.  Also the tramadol was sent in the wrong dose, should be 1/2 tab, 25mg not a whole tab 50mg.     Please redo rx for tramadol.   Maria Kimbrough RMA          Tramadol       *New dose* pharm was informed by pts personal nurse, Take 1/2 tablet 3x/day     Requesting #45

## 2019-01-31 NOTE — TELEPHONE ENCOUNTER
[facility] Call Center    Phone Message    May a detailed message be left on voicemail: yes    Reason for Call: Medication Refill Request    Has the patient contacted the pharmacy for the refill? Yes   Name of medication being requested: propranolol ER (INDERAL LA) 60 MG 24 hr capsule     Provider who prescribed the medication: Dr. King  Pharmacy: [facility] accidentally erased RX in the system and is requesting this RX be resent.  Please advise.  Date medication is needed: ASAP         Action Taken: Message routed to:  Primary Care p [id number]

## 2019-01-31 NOTE — TELEPHONE ENCOUNTER
New Rx sent for 1 month but schedule ER follow up appointment to determine if these change will be long term.      Fax tramadol Rx.

## 2019-02-13 ENCOUNTER — TELEPHONE (OUTPATIENT)
Dept: PEDIATRICS | Facility: CLINIC | Age: 64
End: 2019-02-13

## 2019-02-13 NOTE — TELEPHONE ENCOUNTER
[facility] Call Center    Phone Message    May a detailed message be left on voicemail: yes    Reason for Call: Other: Meredith, pt's home care nurse is looking for re-certification for the pt after 2/20/19. Pt had to leave town suddenly, Meredith states orders only good until 2/18/19. Please call [phone] to give verbal orders to Meredith.     Action Taken: Message routed to:  Primary Care p [phone]

## 2019-02-13 NOTE — TELEPHONE ENCOUNTER
[facility] Call Center    Phone Message    May a detailed message be left on voicemail: yes    Reason for Call: Medication Question or concern regarding medication   Prescription Clarification  Name of Medication: Meredith is calling for medication clarification as she is doing patiens med set up. Patient was recently discharge from [facility] from ED/observation visit. During stay changes were made to her medication. Nurse is calling to get orders.      Patient was originally on enalapril (VASOTEC) 2.5 MG tablet [62056] (Order [id number]) however provider changed it to Ramiprill 2.5mg.     Medication change to: traMADol (ULTRAM) 50 MG tablet [[id number]] (Order [id number]) -  Requesting Rx to be prescribed as 25 mg 3 times a day scheduled.     Requesting new Rx for: propranolol ER (INDERAL LA) 60 MG 24 hr capsule [[id number]] (Order [id number])     Prescribing Provider: Dr. asif   Pharmacy: [facility] pharmacy    What on the order needs clarification? Please call to discuss Rx requests.          Action Taken: Message routed to:  Primary Care p [phone]       Dehydration

## 2019-02-14 DIAGNOSIS — Z53.9 DIAGNOSIS NOT YET DEFINED: Primary | ICD-10-CM

## 2019-02-14 PROCEDURE — G0179 MD RECERTIFICATION HHA PT: HCPCS | Performed by: INTERNAL MEDICINE

## 2019-02-22 ENCOUNTER — TELEPHONE (OUTPATIENT)
Dept: ENDOCRINOLOGY | Facility: CLINIC | Age: 64
End: 2019-02-22

## 2019-02-22 NOTE — TELEPHONE ENCOUNTER
2.22.19  Patient checking on her Novolog refill status.        [id number] - [place], [place] - [address]    Pt states she needs it today.  [phone]

## 2019-02-22 NOTE — TELEPHONE ENCOUNTER
Patient has not been seen by Endocrine since 9/28/17.     Follow up with Dr. Medrano 6/28/19.    Refill completed.    Argelia Maciel LPN  Diabetes Clinic Coordinator   Adult Endocrinology and Pediatric Specialty Clinics  [facility]

## 2019-02-24 ENCOUNTER — OFFICE VISIT (OUTPATIENT)
Dept: URGENT CARE | Facility: URGENT CARE | Age: 64
End: 2019-02-24
Payer: MEDICARE

## 2019-02-24 VITALS
DIASTOLIC BLOOD PRESSURE: 54 MMHG | OXYGEN SATURATION: 95 % | HEART RATE: 71 BPM | TEMPERATURE: 98.5 F | WEIGHT: 139.25 LBS | SYSTOLIC BLOOD PRESSURE: 99 MMHG | BODY MASS INDEX: 25.47 KG/M2

## 2019-02-24 DIAGNOSIS — J40 BRONCHITIS: Primary | ICD-10-CM

## 2019-02-24 DIAGNOSIS — E11.65 TYPE 2 DIABETES MELLITUS WITH HYPERGLYCEMIA, WITH LONG-TERM CURRENT USE OF INSULIN (H): ICD-10-CM

## 2019-02-24 DIAGNOSIS — Z79.4 TYPE 2 DIABETES MELLITUS WITH HYPERGLYCEMIA, WITH LONG-TERM CURRENT USE OF INSULIN (H): ICD-10-CM

## 2019-02-24 PROCEDURE — 99214 OFFICE O/P EST MOD 30 MIN: CPT | Performed by: NURSE PRACTITIONER

## 2019-02-24 RX ORDER — PREDNISONE 20 MG/1
20 TABLET ORAL 2 TIMES DAILY
Qty: 10 TABLET | Refills: 0 | Status: SHIPPED | OUTPATIENT
Start: 2019-02-24 | End: 2019-06-27

## 2019-02-24 RX ORDER — ALBUTEROL SULFATE 90 UG/1
2 AEROSOL, METERED RESPIRATORY (INHALATION) EVERY 6 HOURS
Qty: 1 INHALER | Refills: 0 | Status: SHIPPED | OUTPATIENT
Start: 2019-02-24 | End: 2020-08-29

## 2019-02-24 RX ORDER — CODEINE PHOSPHATE AND GUAIFENESIN 10; 100 MG/5ML; MG/5ML
1-2 SOLUTION ORAL EVERY 4 HOURS PRN
Qty: 180 ML | Refills: 0 | Status: SHIPPED | OUTPATIENT
Start: 2019-02-24 | End: 2019-03-14

## 2019-02-24 NOTE — PATIENT INSTRUCTIONS
Patient Education     What Is Acute Bronchitis?  Acute bronchitis is when the airways in your lungs (bronchial tubes) become red and swollen (inflamed). It is usually caused by a viral infection. But it can also occur because of a bacteria or allergen. Symptoms include a cough that produces yellow or greenish mucus and can last for days or sometimes weeks.  Inside healthy lungs    Air travels in and out of the lungs through the airways. The linings of these airways produce sticky mucus. This mucus traps particles that enter the lungs. Tiny structures called cilia then sweep the particles out of the airways.     Healthy airway: Airways are normally open. Air moves in and out easily.      Healthy cilia: Tiny, hairlike cilia sweep mucus and particles up and out of the airways.     Lungs with bronchitis  Bronchitis often occurs with a cold or the flu virus. The airways become inflamed (red and swollen). There is a deep hacking cough from the extra mucus. Other symptoms may include:    Wheezing    Chest discomfort    Shortness of breath    Mild fever  A second infection, this time due to bacteria, may then occur. And airways irritated by allergens or smoke are more likely to get infected.        Inflamed airway: Inflammation and extra mucus narrow the airway, causing shortness of breath.      Impaired cilia: Extra mucus impairs cilia, causing congestion and wheezing. Smoking makes the problem worse.     Making a diagnosis  A physical exam, health history, and certain tests help your healthcare provider make the diagnosis.  Health history  Your healthcare provider will ask you about your symptoms.  The exam  Your provider listens to your chest for signs of congestion. He or she may also check your ears, nose, and throat.  Possible tests    A sputum test for bacteria. This requires a sample of mucus from your lungs.    A nasal or throat swab. This tests to see if you have a bacterial infection.    A chest X-ray. This is  done if your healthcare provider thinks you have pneumonia.    Tests to check for an underlying condition. Other tests may be done to check for things such as allergies, asthma, or COPD (chronic obstructive pulmonary disease). You may need to see a specialist for more lung function testing.  Treating a cough  The main treatment for bronchitis is easing symptoms. Avoiding smoke, allergens, and other things that trigger coughing can often help. If the infection is bacterial, you may be given antibiotics. During the illness, it's important to get plenty of sleep. To ease symptoms:    Don t smoke. Also avoid secondhand smoke.    Use a humidifier. Or try breathing in steam from a hot shower. This may help loosen mucus.    Drink a lot of water and juice. They can soothe the throat and may help thin mucus.    Sit up or use extra pillows when in bed. This helps to lessen coughing and congestion.    Ask your provider about using medicine. Ask about using cough medicine, pain and fever medicine, or a decongestant.  Antibiotics  Most cases of bronchitis are caused by cold or flu viruses. They don t need antibiotics to treat them, even if your mucus is thick and green or yellow. Antibiotics don t treat viral illness and antibiotics have not been shown to have any benefit in cases of acute bronchitis. Taking antibiotics when they are not needed increases your risk of getting an infection later that is antibiotic-resistant. Antibiotics can also cause severe cases of diarrhea that require other antibiotics to treat.  It is important that you accept your healthcare provider's opinion to not use antibiotics. Your provider will prescribe antibiotics if the infection is caused by bacteria. If they are prescribed:    Take all of the medicine. Take the medicine until it is used up, even if symptoms have improved. If you don t, the bronchitis may come back.    Take the medicines as directed. For instance, some medicines should be taken  with food.    Ask about side effects. Ask your provider or pharmacist what side effects are common, and what to do about them.  Follow-up care  You should see your provider again in 2 to 3 weeks. By this time, symptoms should have improved. An infection that lasts longer may mean you have a more serious problem.  Prevention    Avoid tobacco smoke. If you smoke, quit. Stay away from smoky places. Ask friends and family not to smoke around you, or in your home or car.    Get checked for allergies.    Ask your provider about getting a yearly flu shot. Also ask about pneumococcal or pneumonia shots.    Wash your hands often. This helps reduce the chance of picking up viruses that cause colds and flu.  Call your healthcare provider if:    Symptoms worsen, or you have new symptoms    Breathing problems worsen or  become severe    Symptoms don t get better within a week, or within 3 days of taking antibiotics   Date Last Reviewed: 2/1/2017 2000-2018 [employer]. [address] [place] [address], [place] [address]. All rights reserved. This information is not intended as a substitute for professional medical care. Always follow your healthcare professional's instructions.

## 2019-02-24 NOTE — PROGRESS NOTES
SUBJECTIVE:   Holly Muir is a 63 year old female presenting with a chief complaint of cough.   Onset of symptoms was 1 week(s) ago.  Course of illness is same.    Severity moderate  Current and Associated symptoms: cough -non productive and wheezing, shortness of breath  Treatment measures tried include Tylenol/Ibuprofen, Fluids and Rest.  Predisposing factors include None.  Has diabetes type 2, not well controlled, bgm's have been running in 200-300.     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  Prescribed by Dr. Isael Jiménez at [facility]       ARIPiprazole (ABILIFY) 5 MG tablet Take 1 tablet (5 mg) by mouth At Bedtime Along with 5mg tab to total 25mg.  Prescribed by Dr. Isael Jiménez at [facility] 30 tablet 1     aspirin ([facility] ASPIRIN ADULT LOW DOSE) 81 MG chewable tablet TAKE 1 TABLET (81 MG) BY MOUTH DAILY 90 tablet 3     BASAGLAR 100 UNIT/ML injection Take 55 units daily. 60 mL 0     blood glucose monitoring (ACCU-CHEK CELIA PLUS) meter device kit Use to test blood sugars 4 times daily or as directed. 1 kit 0     blood glucose monitoring (ACCU-CHEK CELIA) test strip Use to test blood sugars 6 times daily or as directed. 600 each 1     blood glucose monitoring (ACCU-CHEK FASTCLIX) lancets Use to test blood sugar 6 times daily or as directed. 510 each 1     buPROPion (WELLBUTRIN XL) 150 MG 24 hr tablet TAKE 3 TABLETS (450 MG) BY MOUTH EVERY MORNING       busPIRone HCl  (BUSPAR) 30 MG tablet TAKE 1 TABLET BY MOUTH TWICE A  tablet 1     Cholecalciferol (VITAMIN D) 2000 units tablet Take 2,000 Units by mouth daily 100 tablet 3     gabapentin (NEURONTIN) 300 MG capsule TAKE ONE CAPSULE BY MOUTH TWICE A DAY TAKE 2 CAPSULES BY MOUTH IN THE EVENING 360 capsule 1     ibuprofen (ADVIL/MOTRIN) 800 MG tablet Take 1 tablet (800 mg) by mouth every 8 hours as needed for moderate pain (with food) 30 tablet 0     insulin aspart (NOVOLOG FLEXPEN) 100 UNIT/ML pen TAKE AS DIRECTED WITH MEALS. TOTAL DAILY DOSE APPROX 35 UNITS. 30 mL 1     insulin pen needle 31G X 5 MM Use four times 4 day times a day 400 each 1     metFORMIN (GLUCOPHAGE) 1000 MG tablet TAKE 1 TABLET BY MOUTH TWICE A DAY WITH MEALS 180 tablet 0     Multiple Vitamin (DAILY-ISAAC) TABS TAKE 1 TABLET BY MOUTH DAILY 30 tablet 11     nystatin (MYCOSTATIN) 359139 UNIT/GM POWD Apply 1 dose topically 3 times daily as needed 60 g 3     omeprazole (PRILOSEC) 20 MG CR capsule TAKE 2 CAPSULES BY MOUTH DAILY TAKE 30-60 MINUTES BEFORE A MEAL. 180 capsule 2     order for DME Equipment being ordered: grab bar for bath tub/shower. 1 Units 0     order for DME Equipment being ordered: railing for stairs at home. 1 Units 0     order for DME Equipment being ordered: Knee walker  1 Device 0     order for DME by Device route continuous Air walker -  Use as instructed 1 Device 0     ORDER FOR DME Equipment being ordered:  Ostomy supplies.  Drain Pouch by [employer] #.    Also needs Barrier by [employer] #. 3 Month 4     ORDER FOR DME Equipment being ordered: Cane 1 Units 0     PARoxetine (PAXIL) 20 MG tablet TAKE 2 TABLETS AT BEDTIME (Prescribed by Dr. Isael Jiménez at [facility]) 30 tablet      pioglitazone (ACTOS) 30 MG tablet Take 1 tablet (30 mg) by mouth daily 90 tablet 1     propranolol ER (INDERAL LA) 60 MG 24 hr capsule TAKE ONE CAPSULE BY MOUTH EVERY DAY, MAY REPEAT DOSE IF TREMOR PERSIST 60 capsule 5     ramipril  (ALTACE) 5 MG capsule Take 1 capsule (5 mg) by mouth daily 30 capsule 0     simvastatin (ZOCOR) 20 MG tablet TAKE 1 TABLET (20 MG) BY MOUTH AT BEDTIME (DUE FOR OFFICE VISIT FOR FUTURE REFILLS) 90 tablet 1     traMADol (ULTRAM) 50 MG tablet Take 0.5 tablets (25 mg) by mouth 3 times daily 45 tablet 0     UNIVERSAL REMOVER WIPES EX MISC uses to remove barrier from colostomy bag at time of change 1 Each 3     BASAGLAR 100 UNIT/ML injection Inject 55 Units Subcutaneous At Bedtime 16.5 mL 3     Social History     Tobacco Use     Smoking status: Former Smoker     Last attempt to quit: 2016     Years since quittin.5     Smokeless tobacco: Never Used   Substance Use Topics     Alcohol use: Yes     Alcohol/week: 0.0 oz     Comment: social occasions       ROS:  CONSTITUTIONAL:NEGATIVE for fever, chills, change in weight  INTEGUMENTARY/SKIN: NEGATIVE for worrisome rashes, moles or lesions  EYES: NEGATIVE for vision changes or irritation  ENT/MOUTH: NEGATIVE for ear, mouth and throat problems  RESP:POSITIVE for cough-non productive, SOB/dyspnea and wheezing  CV: NEGATIVE for chest pain, palpitations or peripheral edema  GI: NEGATIVE for nausea, abdominal pain, heartburn, or change in bowel habits  MUSCULOSKELETAL: NEGATIVE for significant arthralgias or myalgia  NEURO: NEGATIVE for weakness, dizziness or paresthesias  ENDOCRINE: POSITIVE  for diabetic type 2   HEME/ALLERGY/IMMUNE: NEGATIVE for bleeding problems  PSYCHIATRIC: NEGATIVE for changes in mood or affect    OBJECTIVE:  BP 99/54   Pulse 71   Temp 98.5  F (36.9  C) (Tympanic)   Wt 63.2 kg (139 lb 4 oz)   SpO2 95%   BMI 25.47 kg/m    GENERAL APPEARANCE: alert and no distress  EYES: EOMI,  PERRL, conjunctiva clear  HENT: ear canals and TM's normal.  Nose and mouth without ulcers, erythema or lesions  NECK: supple, nontender, no lymphadenopathy  RESP:  expiratory wheezes throughout  CV: regular rates and rhythm, normal S1 S2, no murmur noted  ABDOMEN:  soft,  nontender, no HSM or masses and bowel sounds normal  NEURO: Normal strength and tone, sensory exam grossly normal,  normal speech and mentation  SKIN: no suspicious lesions or rashes    ASSESSMENT:  (J40) Bronchitis  (primary encounter diagnosis)    Plan:   Discussed viral, course of illness and contagiousness  -predniSONE (DELTASONE) 20 MG tablet BID X 5 days  - albuterol (PROAIR HFA/PROVENTIL HFA/VENTOLIN HFA) 108 (90 Base) MCG/ACT inhaler -guaiFENesin-codeine (ROBITUSSIN AC) 100-10 MG/5ML solution (before bed, advised can cause drowsiness)  OTC medications as discussed, fluids, rest, humidifier.       (E11.65,  Z79.4) Type 2 diabetes mellitus with hyperglycemia, with long-term current use of insulin (H)    Plan: Continue to check blood sugars. Discussed emergent symptoms to ER  May be elevated while ill, continue medication  Follow up with pcp or endocrinologist as needed    KASIE Sullivan CNP

## 2019-02-28 DIAGNOSIS — Z53.9 DIAGNOSIS NOT YET DEFINED: Primary | ICD-10-CM

## 2019-02-28 PROCEDURE — G0179 MD RECERTIFICATION HHA PT: HCPCS | Performed by: INTERNAL MEDICINE

## 2019-03-08 DIAGNOSIS — I10 HYPERTENSION GOAL BP (BLOOD PRESSURE) < 140/90: ICD-10-CM

## 2019-03-08 RX ORDER — RAMIPRIL 5 MG/1
5 CAPSULE ORAL DAILY
Qty: 90 CAPSULE | Refills: 0 | Status: SHIPPED | OUTPATIENT
Start: 2019-03-08 | End: 2019-06-09

## 2019-03-08 NOTE — TELEPHONE ENCOUNTER
LOV- 12/14 says to f/u in 6 months and patient is scheduled on 6/14. Prescription approved per refill protocol.    Asya Howell RN

## 2019-03-10 DIAGNOSIS — Z79.4 UNCONTROLLED TYPE 2 DIABETES MELLITUS WITH HYPERGLYCEMIA, WITH LONG-TERM CURRENT USE OF INSULIN (H): ICD-10-CM

## 2019-03-10 DIAGNOSIS — E11.65 UNCONTROLLED TYPE 2 DIABETES MELLITUS WITH HYPERGLYCEMIA, WITH LONG-TERM CURRENT USE OF INSULIN (H): ICD-10-CM

## 2019-03-11 NOTE — TELEPHONE ENCOUNTER
LOV- 12/14 says wellness in 6 months. Prescription approved per refill protocol.    Asya Howell RN

## 2019-03-14 ENCOUNTER — OFFICE VISIT (OUTPATIENT)
Dept: DERMATOLOGY | Facility: CLINIC | Age: 64
End: 2019-03-14
Payer: MEDICARE

## 2019-03-14 DIAGNOSIS — B07.8 OTHER VIRAL WARTS: Primary | ICD-10-CM

## 2019-03-14 PROCEDURE — 11900 INJECT SKIN LESIONS </W 7: CPT | Mod: 59 | Performed by: DERMATOLOGY

## 2019-03-14 PROCEDURE — 17110 DESTRUCTION B9 LES UP TO 14: CPT | Performed by: DERMATOLOGY

## 2019-03-14 RX ORDER — CANDIDA ALBICANS 1000 [PNU]/ML
0.1 INJECTION, SOLUTION INTRADERMAL ONCE
Status: COMPLETED | OUTPATIENT
Start: 2019-03-14 | End: 2019-03-14

## 2019-03-14 RX ADMIN — CANDIDA ALBICANS 0.3 ML: 1000 INJECTION, SOLUTION INTRADERMAL at 11:08

## 2019-03-14 ASSESSMENT — PAIN SCALES - GENERAL: PAINLEVEL: NO PAIN (0)

## 2019-03-14 NOTE — LETTER
"    3/14/2019         RE: Holly Muir  [address] [facility] [id number]        Dear Colleague,    Thank you for referring your patient, Holly Muir, to the [facility]. Please see a copy of my visit note below.    [facility] Dermatology Note      Dermatology Problem List:  1. Verrucous vulgaris x3, currently has 2  -s/p cryotherapy and candida injections 12/13/2018, 1/10/19, 3/14/2019  -home treatments with salicyclic acid    Encounter Date: Mar 14, 2019    CC:  Chief Complaint   Patient presents with     Wart     Since last visit, has improved \"some\" but still there         History of Present Illness:  Ms. Holly Muir is a 64 year old female who presents as a follow for wart treatment. She was last seen by Dr. Harding 1/10/2019 when they were treated with cryotherapy and candida. She has noticed some improvement since her last visit. She has been doing at home treatments, a liquid salicylic acid treatment she purchased OTC. No other concerns addressed today.    Past Medical History:   Patient Active Problem List   Diagnosis     Hyperlipidemia LDL goal <100     Severe major depression with psychotic features (H)     History of cervical cancer     S/P colostomy (H)     Mixed incontinence urge and stress     Memory loss     Type 2 diabetes mellitus, uncontrolled A1C goal <8%     Obesity     Pain in joint, hand     Trigger finger, acquired     Synovitis and tenosynovitis     Dermatochalasis     Presbyopia     OCD (obsessive compulsive disorder)     HTN, goal below 140/90     Cataracts, both eyes     Health Care Home     Lumbago     Sprain of lumbar region     Generalized osteoarthrosis, unspecified site     Rotator cuff impingement syndrome     Hypoglycemia due to insulin     Encounter for long-term (current) use of insulin (H)     Colostomy, evaluate (H)     Urinary retention     ACP (advance care planning)     Bilateral low back pain without " sciatica     Anxiety     Fecal incontinence due to anorectal disorder     Visual hallucination     Type 2 diabetes mellitus with hyperglycemia (H)     Chronic pain syndrome     Obesity (BMI 35.0-39.9) with comorbidity (H)     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     Past Surgical History:   Procedure Laterality Date     ARTHROSCOPY KNEE RT/LT      LT     BREAST LUMPECTOMY, RT/LT      LT-benign      C EXCIS PTERYGIUM  10/08    [facility]     C STOMACH SURGERY PROCEDURE UNLISTED       COLONOSCOPY  5/10/2012    Procedure:COLONOSCOPY; screening colonoscopy; Surgeon:MILLA VEGA; [facility]     COLOSTOMY  2000     COLOSTOMY       HC COLONOSCOPY THRU STOMA, DIAGNOSTIC  2002    normal per report-no records available-records destroyed     HYSTERECTOMY, PAP NO LONGER INDICATED      done in [place]-cervical cancer     IMPLANT STIMULATOR SACRAL NERVE STAGE ONE Right 3/10/2015    Procedure: IMPLANT STIMULATOR SACRAL NERVE STAGE ONE;  Surgeon: Teodora Yusuf MD;  [facility] OR     IMPLANT STIMULATOR SACRAL NERVE STAGE TWO Right 3/31/2015    Procedure: IMPLANT STIMULATOR SACRAL NERVE STAGE TWO;  Surgeon: Teodora Yusuf MD;  [facility] OR     KNEE SURGERY       SALPINGO OOPHORECTOMY,R/L/JENNIFER      Salpingo Oophorectomy, RT/LT/JENNIFER       Social History:  Patient reports that she quit smoking about 2 years ago. she has never used smokeless tobacco. She reports that she drinks alcohol. She reports that she does not use drugs. Patient is on disability. She has grandchildren.       Family History:  Family History   Problem Relation Age of Onset     Diabetes Mother       Cerebrovascular Disease Mother      Diabetes Father      Hypertension Father      Cancer Maternal Grandfather      Cancer Paternal Grandfather      Diabetes Brother      Diabetes Sister      Thyroid Disease Daughter      Thyroid Disease Sister      Multiple Sclerosis Daughter      Glaucoma No family hx of      Macular Degeneration No family hx of        Medications:  Current Outpatient Medications   Medication Sig Dispense Refill     albuterol (PROAIR HFA/PROVENTIL HFA/VENTOLIN HFA) 108 (90 Base) MCG/ACT inhaler Inhale 2 puffs into the lungs every 6 hours 1 Inhaler 0     ARIPiprazole (ABILIFY) 20 MG tablet Take 1 tablet (20 mg) by mouth daily Along with 5mg tab to total 25mg.  Prescribed by Dr. Isael Jiménez at [facility]       ARIPiprazole (ABILIFY) 5 MG tablet Take 1 tablet (5 mg) by mouth At Bedtime Along with 5mg tab to total 25mg.  Prescribed by Dr. Isael Jiménez at [facility] 30 tablet 1     aspirin ([facility] ASPIRIN ADULT LOW DOSE) 81 MG chewable tablet TAKE 1 TABLET (81 MG) BY MOUTH DAILY 90 tablet 3     BASAGLAR 100 UNIT/ML injection Take 55 units daily. 60 mL 0     blood glucose monitoring (ACCU-CHEK CELIA PLUS) meter device kit Use to test blood sugars 4 times daily or as directed. 1 kit 0     blood glucose monitoring (ACCU-CHEK CELIA) test strip Use to test blood sugars 6 times daily or as directed. 600 each 1     blood glucose monitoring (ACCU-CHEK FASTCLIX) lancets Use to test blood sugar 6 times daily or as directed. 510 each 1     buPROPion (WELLBUTRIN XL) 150 MG 24 hr tablet TAKE 3 TABLETS (450 MG) BY MOUTH EVERY MORNING       busPIRone HCl (BUSPAR) 30 MG tablet TAKE 1 TABLET BY MOUTH TWICE A  tablet 1     Cholecalciferol (VITAMIN D) 2000 units tablet Take 2,000 Units by mouth daily 100 tablet 3     gabapentin (NEURONTIN) 300 MG capsule TAKE ONE CAPSULE BY MOUTH TWICE A DAY TAKE 2 CAPSULES BY MOUTH IN THE EVENING 360 capsule 1     ibuprofen (ADVIL/MOTRIN) 800 MG tablet Take 1  tablet (800 mg) by mouth every 8 hours as needed for moderate pain (with food) 30 tablet 0     insulin aspart (NOVOLOG FLEXPEN) 100 UNIT/ML pen TAKE AS DIRECTED WITH MEALS. TOTAL DAILY DOSE APPROX 35 UNITS. 30 mL 1     insulin pen needle 31G X 5 MM Use four times 4 day times a day 400 each 1     metFORMIN (GLUCOPHAGE) 1000 MG tablet TAKE 1 TABLET BY MOUTH TWICE A DAY WITH MEALS 180 tablet 0     Multiple Vitamin (DAILY-ISAAC) TABS TAKE 1 TABLET BY MOUTH DAILY 30 tablet 11     nystatin (MYCOSTATIN) 453944 UNIT/GM POWD Apply 1 dose topically 3 times daily as needed 60 g 3     omeprazole (PRILOSEC) 20 MG CR capsule TAKE 2 CAPSULES BY MOUTH DAILY TAKE 30-60 MINUTES BEFORE A MEAL. 180 capsule 2     order for DME Equipment being ordered: grab bar for bath tub/shower. 1 Units 0     order for DME Equipment being ordered: railing for stairs at home. 1 Units 0     order for DME Equipment being ordered: Knee walker  1 Device 0     order for DME by Device route continuous Air walker -  Use as instructed 1 Device 0     ORDER FOR DME Equipment being ordered:  Ostomy supplies.  Drain Pouch by [place] #.    Also needs Barrier by [place] #. 3 Month 4     ORDER FOR DME Equipment being ordered: Cane 1 Units 0     PARoxetine (PAXIL) 20 MG tablet TAKE 2 TABLETS AT BEDTIME (Prescribed by Dr. Isael Jiménez at [facility]) 30 tablet      pioglitazone (ACTOS) 30 MG tablet Take 1 tablet (30 mg) by mouth daily 90 tablet 1     propranolol ER (INDERAL LA) 60 MG 24 hr capsule TAKE ONE CAPSULE BY MOUTH EVERY DAY, MAY REPEAT DOSE IF TREMOR PERSIST 60 capsule 5     ramipril (ALTACE) 5 MG capsule TAKE 1 CAPSULE (5 MG) BY MOUTH DAILY 90 capsule 0     traMADol (ULTRAM) 50 MG tablet Take 0.5 tablets (25 mg) by mouth 3 times daily 45 tablet 0     UNIVERSAL REMOVER WIPES EX MISC uses to remove barrier from colostomy bag at time of change 1 Each 3     BASAGLAR 100 UNIT/ML injection Inject 55 Units Subcutaneous At Bedtime 16.5 mL  3     simvastatin (ZOCOR) 20 MG tablet TAKE 1 TABLET (20 MG) BY MOUTH AT BEDTIME (DUE FOR OFFICE VISIT FOR FUTURE REFILLS) (Patient not taking: Reported on 3/14/2019) 90 tablet 1       Allergies   Allergen Reactions     Morphine Sulfate Itching       Review of Systems:  -Constitutional: Patient is otherwise feeling well, in usual state of health.   -Skin: As above in HPI. No additional skin concerns.    Physical exam:  Vitals: There were no vitals taken for this visit.  GEN: This is a well developed, well-nourished female in no acute distress, in a pleasant mood.    SKIN: Sun-exposed skin, which includes the head/face, neck, both arms, digits, and/or nails was examined.   - verrucous papule on the right palmar thumb  - verrucous plaque left ring finger medial aspect of nail fold  - no other lesions of concern on areas examined.       Impression/Plan:  1. Viral warts: Discussed the viral nature of warts. Explained that there are many treatment options and none of them work consistently for every wart. At this time, I recommended a combination of home and in office treatments. In office monthly, we will do cryotherapy and candida injections (expectations and risks discussed in detail).  In between visits, will have patient use over the counter salicylic acid nightly. This is treatment #3.     Cryotherapy procedure note: After verbal consent and discussion of risks and benefits including but no limited to dyspigmentation/scar, blister, and pain, 2 was(were) treated with 1-2mm freeze border for 3 cycles with liquid nitrogen. Post cryotherapy instructions were provided.    After cleansing with alcohol, a total of 0.3 cc of candida antigen was injected into 2 suitable lesions. The patient tolerated the procedure well.        Recommended she buy Wart Stick on Amazon to continue home treatments.     Follow-up in 4 weeks, earlier for new or changing lesions.       Staff Involved:  Scribe/Staff    Scribe Disclosure  I,  Carmela Gage, am serving as a scribe to document services personally performed by Dr. Rossy Harding MD, based on data collection and the provider's statements to me.     Provider Disclosure:   The documentation recorded by the scribe accurately reflects the services I personally performed and the decisions made by me.    Rossy Harding MD    Department of Dermatology  [facility]: Phone: [phone], Fax:[phone]  [facility]: Phone: [phone], Fax: [phone]                            Again, thank you for allowing me to participate in the care of your patient.        Sincerely,        Rossy Harding MD

## 2019-03-14 NOTE — PATIENT INSTRUCTIONS
Start at home salicylic acid treatment, a week after cryotherapy treatment. Apply Wart Stick 40% (found on Amazon) at bedtime, cover with a Bandaid. Remove the Band-aid in the morning.       Cryotherapy    What is it?    Use of a very cold liquid, such as liquid nitrogen, to freeze and destroy abnormal skin cells that need to be removed    What should I expect?    Tenderness and redness    A small blister that might grow and fill with dark purple blood. There may be crusting.    More than one treatment may be needed if the lesions do not go away.    How do I care for the treated area?    Gently wash the area with your hands when bathing.    Use a thin layer of Vaseline to help with healing. You may use a Band-Aid.     The area should heal within 7-10 days and may leave behind a pink or lighter color.     Do not use an antibiotic or Neosporin ointment.     You may take acetaminophen (Tylenol) for pain.     Call your Doctor if you have:    Severe pain    Signs of infection (warmth, redness, cloudy yellow drainage, and or a bad smell)    Questions or concerns    Who should I call with questions?       [facility]: [phone]       [facility]: [phone]       For urgent needs outside of business hours call the [facility] at [phone] and ask for the dermatology resident on call

## 2019-03-14 NOTE — NURSING NOTE
"Holly Muir's goals for this visit include:   Chief Complaint   Patient presents with     Wart     Since last visit, has improved \"some\" but still there       She requests these members of her care team be copied on today's visit information: PCP    PCP: Tiffany King    Referring Provider:  No referring provider defined for this encounter.    There were no vitals taken for this visit.    Do you need any medication refills at today's visit? None      Ginger Davila CMA        "

## 2019-03-14 NOTE — PROGRESS NOTES
"[facility] Dermatology Note      Dermatology Problem List:  1. Verrucous vulgaris x3, currently has 2  -s/p cryotherapy and candida injections 12/13/2018, 1/10/19, 3/14/2019  -home treatments with salicyclic acid    Encounter Date: Mar 14, 2019    CC:  Chief Complaint   Patient presents with     Wart     Since last visit, has improved \"some\" but still there         History of Present Illness:  Ms. Holly Muir is a 64 year old female who presents as a follow for wart treatment. She was last seen by Dr. Harding 1/10/2019 when they were treated with cryotherapy and candida. She has noticed some improvement since her last visit. She has been doing at home treatments, a liquid salicylic acid treatment she purchased OTC. No other concerns addressed today.    Past Medical History:   Patient Active Problem List   Diagnosis     Hyperlipidemia LDL goal <100     Severe major depression with psychotic features (H)     History of cervical cancer     S/P colostomy (H)     Mixed incontinence urge and stress     Memory loss     Type 2 diabetes mellitus, uncontrolled A1C goal <8%     Obesity     Pain in joint, hand     Trigger finger, acquired     Synovitis and tenosynovitis     Dermatochalasis     Presbyopia     OCD (obsessive compulsive disorder)     HTN, goal below 140/90     Cataracts, both eyes     Health Care Home     Lumbago     Sprain of lumbar region     Generalized osteoarthrosis, unspecified site     Rotator cuff impingement syndrome     Hypoglycemia due to insulin     Encounter for long-term (current) use of insulin (H)     Colostomy, evaluate (H)     Urinary retention     ACP (advance care planning)     Bilateral low back pain without sciatica     Anxiety     Fecal incontinence due to anorectal disorder     Visual hallucination     Type 2 diabetes mellitus with hyperglycemia (H)     Chronic pain syndrome     Obesity (BMI 35.0-39.9) with comorbidity (H)     Past Medical History:   Diagnosis " Date     Cataracts, both eyes 5/8/2013     Cervical cancer (H)      Diabetes      Diabetic retinopathy (H)      HTN      Inflammatory arthritis      Major depression      Memory loss      Nephropathy      OA (osteoarthritis) of knee 9/11/2013     Other and unspecified hyperlipidemia      Pterygium eye      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     Past Surgical History:   Procedure Laterality Date     ARTHROSCOPY KNEE RT/LT      LT     BREAST LUMPECTOMY, RT/LT      LT-benign      C EXCIS PTERYGIUM  10/08    [facility]     C STOMACH SURGERY PROCEDURE UNLISTED       COLONOSCOPY  5/10/2012    Procedure:COLONOSCOPY; screening colonoscopy; Surgeon:MILLA VEGA; Location:[facility] OR     COLOSTOMY  2000     COLOSTOMY       HC COLONOSCOPY THRU STOMA, DIAGNOSTIC  2002    normal per report-no records available-records destroyed     HYSTERECTOMY, PAP NO LONGER INDICATED      done in [place]-cervical cancer     IMPLANT STIMULATOR SACRAL NERVE STAGE ONE Right 3/10/2015    Procedure: IMPLANT STIMULATOR SACRAL NERVE STAGE ONE;  Surgeon: Teodora Yusuf MD;  [facility] OR     IMPLANT STIMULATOR SACRAL NERVE STAGE TWO Right 3/31/2015    Procedure: IMPLANT STIMULATOR SACRAL NERVE STAGE TWO;  Surgeon: Teodora Yusuf MD;  [facility] OR     KNEE SURGERY       SALPINGO OOPHORECTOMY,R/L/JENNIFER      Salpingo Oophorectomy, RT/LT/JENNIFER       Social History:  Patient reports that she quit smoking about 2 years ago. she has never used smokeless tobacco. She reports that she drinks alcohol. She reports that she does not use drugs. Patient is on disability. She has grandchildren.       Family History:  Family History   Problem Relation Age of Onset     Diabetes Mother      Cerebrovascular Disease Mother      Diabetes Father      Hypertension Father      Cancer Maternal Grandfather      Cancer Paternal Grandfather      Diabetes Brother      Diabetes Sister      Thyroid Disease Daughter      Thyroid Disease Sister      Multiple  Sclerosis Daughter      Glaucoma No family hx of      Macular Degeneration No family hx of        Medications:  Current Outpatient Medications   Medication Sig Dispense Refill     albuterol (PROAIR HFA/PROVENTIL HFA/VENTOLIN HFA) 108 (90 Base) MCG/ACT inhaler Inhale 2 puffs into the lungs every 6 hours 1 Inhaler 0     ARIPiprazole (ABILIFY) 20 MG tablet Take 1 tablet (20 mg) by mouth daily Along with 5mg tab to total 25mg.  Prescribed by Dr. Isael Jiménez at [facility]       ARIPiprazole (ABILIFY) 5 MG tablet Take 1 tablet (5 mg) by mouth At Bedtime Along with 5mg tab to total 25mg.  Prescribed by Dr. Isael Jim[facility]nez at [facility] 30 tablet 1     aspirin ([employer] ASPIRIN ADULT LOW DOSE) 81 MG chewable tablet TAKE 1 TABLET (81 MG) BY MOUTH DAILY 90 tablet 3     BASAGLAR 100 UNIT/ML injection Take 55 units daily. 60 mL 0     blood glucose monitoring (ACCU-CHEK CELIA PLUS) meter device kit Use to test blood sugars 4 times daily or as directed. 1 kit 0     blood glucose monitoring (ACCU-CHEK CELIA) test strip Use to test blood sugars 6 times daily or as directed. 600 each 1     blood glucose monitoring (ACCU-CHEK FASTCLIX) lancets Use to test blood sugar 6 times daily or as directed. 510 each 1     buPROPion (WELLBUTRIN XL) 150 MG 24 hr tablet TAKE 3 TABLETS (450 MG) BY MOUTH EVERY MORNING       busPIRone HCl (BUSPAR) 30 MG tablet TAKE 1 TABLET BY MOUTH TWICE A  tablet 1     Cholecalciferol (VITAMIN D) 2000 units tablet Take 2,000 Units by mouth daily 100 tablet 3     gabapentin (NEURONTIN) 300 MG capsule TAKE ONE CAPSULE BY MOUTH TWICE A DAY TAKE 2 CAPSULES BY MOUTH IN THE EVENING 360 capsule 1     ibuprofen (ADVIL/MOTRIN) 800 MG tablet Take 1 tablet (800 mg) by mouth every 8 hours as needed for moderate pain (with food) 30 tablet 0     insulin aspart (NOVOLOG FLEXPEN) 100 UNIT/ML pen TAKE AS DIRECTED WITH MEALS. TOTAL DAILY DOSE APPROX 35 UNITS. 30 mL 1     insulin pen needle 31G X 5 MM Use four  times 4 day times a day 400 each 1     metFORMIN (GLUCOPHAGE) 1000 MG tablet TAKE 1 TABLET BY MOUTH TWICE A DAY WITH MEALS 180 tablet 0     Multiple Vitamin (DAILY-ISAAC) TABS TAKE 1 TABLET BY MOUTH DAILY 30 tablet 11     nystatin (MYCOSTATIN) 693961 UNIT/GM POWD Apply 1 dose topically 3 times daily as needed 60 g 3     omeprazole (PRILOSEC) 20 MG CR capsule TAKE 2 CAPSULES BY MOUTH DAILY TAKE 30-60 MINUTES BEFORE A MEAL. 180 capsule 2     order for DME Equipment being ordered: grab bar for bath tub/shower. 1 Units 0     order for DME Equipment being ordered: railing for stairs at home. 1 Units 0     order for DME Equipment being ordered: Knee walker  1 Device 0     order for DME by Device route continuous Air walker -  Use as instructed 1 Device 0     ORDER FOR DME Equipment being ordered:  Ostomy supplies.  Drain Pouch by [place] #.    Also needs Barrier by [place] #. 3 Month 4     ORDER FOR DME Equipment being ordered: Cane 1 Units 0     PARoxetine (PAXIL) 20 MG tablet TAKE 2 TABLETS AT BEDTIME (Prescribed by Dr. Isael Jiménez at [facility]) 30 tablet      pioglitazone (ACTOS) 30 MG tablet Take 1 tablet (30 mg) by mouth daily 90 tablet 1     propranolol ER (INDERAL LA) 60 MG 24 hr capsule TAKE ONE CAPSULE BY MOUTH EVERY DAY, MAY REPEAT DOSE IF TREMOR PERSIST 60 capsule 5     ramipril (ALTACE) 5 MG capsule TAKE 1 CAPSULE (5 MG) BY MOUTH DAILY 90 capsule 0     traMADol (ULTRAM) 50 MG tablet Take 0.5 tablets (25 mg) by mouth 3 times daily 45 tablet 0     UNIVERSAL REMOVER WIPES EX MISC uses to remove barrier from colostomy bag at time of change 1 Each 3     BASAGLAR 100 UNIT/ML injection Inject 55 Units Subcutaneous At Bedtime 16.5 mL 3     simvastatin (ZOCOR) 20 MG tablet TAKE 1 TABLET (20 MG) BY MOUTH AT BEDTIME (DUE FOR OFFICE VISIT FOR FUTURE REFILLS) (Patient not taking: Reported on 3/14/2019) 90 tablet 1       Allergies   Allergen Reactions     Morphine Sulfate Itching       Review  of Systems:  -Constitutional: Patient is otherwise feeling well, in usual state of health.   -Skin: As above in HPI. No additional skin concerns.    Physical exam:  Vitals: There were no vitals taken for this visit.  GEN: This is a well developed, well-nourished female in no acute distress, in a pleasant mood.    SKIN: Sun-exposed skin, which includes the head/face, neck, both arms, digits, and/or nails was examined.   - verrucous papule on the right palmar thumb  - verrucous plaque left ring finger medial aspect of nail fold  - no other lesions of concern on areas examined.       Impression/Plan:  1. Viral warts: Discussed the viral nature of warts. Explained that there are many treatment options and none of them work consistently for every wart. At this time, I recommended a combination of home and in office treatments. In office monthly, we will do cryotherapy and candida injections (expectations and risks discussed in detail).  In between visits, will have patient use over the counter salicylic acid nightly. This is treatment #3.     Cryotherapy procedure note: After verbal consent and discussion of risks and benefits including but no limited to dyspigmentation/scar, blister, and pain, 2 was(were) treated with 1-2mm freeze border for 3 cycles with liquid nitrogen. Post cryotherapy instructions were provided.    After cleansing with alcohol, a total of 0.3 cc of candida antigen was injected into 2 suitable lesions. The patient tolerated the procedure well.        Recommended she buy Wart Stick on Amazon to continue home treatments.     Follow-up in 4 weeks, earlier for new or changing lesions.       Staff Involved:  Scribe/Staff    Scribe Disclosure  I, Carmela Gage, am serving as a scribe to document services personally performed by Dr. Rossy Harding MD, based on data collection and the provider's statements to me.     Provider Disclosure:   The documentation recorded by the scribe accurately reflects the  services I personally performed and the decisions made by me.    Rossy Harding MD    Department of Dermatology  [facility]: Phone: [phone], Fax:[phone]  [facility]: Phone: [phone], Fax: [phone]

## 2019-03-20 ENCOUNTER — TELEPHONE (OUTPATIENT)
Dept: PEDIATRICS | Facility: CLINIC | Age: 64
End: 2019-03-20

## 2019-03-20 DIAGNOSIS — J40 BRONCHITIS: ICD-10-CM

## 2019-03-20 NOTE — TELEPHONE ENCOUNTER
Health Call Center    Phone Message    May a detailed message be left on voicemail: yes    Reason for Call: Medication Refill Request    Has the patient contacted the pharmacy for the refill? Yes   Name of medication being requested: albuterol (PROAIR HFA/PROVENTIL HFA/VENTOLIN HFA) 108 (90 Base) MCG/ACT inhaler [4595] (Order [id number])       Provider who prescribed the medication: Dr. King  Pharmacy: [facility]   Date medication is needed: Please call Meredith RIVAS with  Homecare with questions [phone]         Action Taken: Message routed to:  Primary Care p [phone]

## 2019-04-08 RX ORDER — ALBUTEROL SULFATE 90 UG/1
2 AEROSOL, METERED RESPIRATORY (INHALATION) EVERY 6 HOURS
Qty: 18 G | Refills: 0 | OUTPATIENT
Start: 2019-04-08

## 2019-04-10 ENCOUNTER — TELEPHONE (OUTPATIENT)
Dept: ENDOCRINOLOGY | Facility: CLINIC | Age: 64
End: 2019-04-10

## 2019-04-10 DIAGNOSIS — M25.562 ARTHRALGIA OF BOTH KNEES: ICD-10-CM

## 2019-04-10 DIAGNOSIS — M25.561 ARTHRALGIA OF BOTH KNEES: ICD-10-CM

## 2019-04-10 DIAGNOSIS — M17.10 ARTHRITIS OF KNEE: ICD-10-CM

## 2019-04-10 NOTE — TELEPHONE ENCOUNTER
Meredith with [facility] left a voice mail for Mikayla Sellers, stating the patient started having high blood sugars about April 2nd in the 200-300 range. She was running in 200-300 range for a week, had 1 low, 67, then has gone back up and is still running in the 200-300 range. Meredith is asking for a call back about these readings.     Yajaira Rajan, [facility]  Adult Endocrinology  [facility]

## 2019-04-10 NOTE — TELEPHONE ENCOUNTER
Pending Prescriptions:                       Disp   Refills    traMADol (ULTRAM) 50 MG tablet [Pharmacy *90 tab*0            Sig: TAKE 1 TABLET BY MOUTH 3 TIMES DAILY AS NEEDED           FOR SEVERE PAIN      Last Written Prescription Date:  1/31/19  Last Fill Quantity: 45,  # refills: 0   Last office visit: 12/14/2018 with prescribing provider:  Dr. Tiffany King   Future Office Visit:   Next 5 appointments (look out 90 days)    Apr 11, 2019  1:15 PM CDT  Return Visit with Rossy Harding MD  [facility]) [address] [place] [id number]  [phone]   Jun 14, 2019  2:10 PM CDT  PHYSICAL with Tiffany King MD PhD  [facility]) [address] [place] [id number]  [phone]   Jun 28, 2019 10:45 AM CDT  Return Visit with Elizabeth Medrano MD  [facility]) [address] [place] [id number]  [phone]           Routing refill request to provider for review/approval because:  Drug not on the [facility], [facility] or [facility] refill protocol or controlled substance

## 2019-04-11 RX ORDER — TRAMADOL HYDROCHLORIDE 50 MG/1
TABLET ORAL
Qty: 90 TABLET | Refills: 0 | Status: SHIPPED | OUTPATIENT
Start: 2019-04-11 | End: 2019-06-19

## 2019-04-17 ENCOUNTER — TELEPHONE (OUTPATIENT)
Dept: PEDIATRICS | Facility: CLINIC | Age: 64
End: 2019-04-17

## 2019-04-17 ENCOUNTER — TELEPHONE (OUTPATIENT)
Dept: ENDOCRINOLOGY | Facility: CLINIC | Age: 64
End: 2019-04-17

## 2019-04-17 NOTE — TELEPHONE ENCOUNTER
Spoke with pt and home health nurse, Lesvia. Both advised to to have pt increase Basaglar from 58 to 62 units. Home health RN will return to see pt next Wed (visits pt every wed). Will call in bg readings if they continue to be significantly elevated.      Mikayla Sellers, RN, BSN, CDE   [facility]

## 2019-04-17 NOTE — TELEPHONE ENCOUNTER
[facility] Call Center    Phone Message    May a detailed message be left on voicemail: no    Reason for Call: Order(s): Home Care Orders: Skilled Nursing: Weekly visits.       Action Taken: Message routed to:  Primary Care p [phone]

## 2019-04-17 NOTE — TELEPHONE ENCOUNTER
Meredith from [facility], left a message for Mikayla about the patients blood sugars. Meredith states the patients blood sugars were:    4/17/19 - 303  4/16/19 - 315, 327 and 270  4/15/19 - 162 and 457  4/11/19 - 279    Meredith requesting a call back from Mikayla.    Yajaira Rajan, [facility]  Adult Endocrinology  [facility]

## 2019-04-23 DIAGNOSIS — Z53.9 DIAGNOSIS NOT YET DEFINED: Primary | ICD-10-CM

## 2019-04-23 PROCEDURE — G0179 MD RECERTIFICATION HHA PT: HCPCS | Performed by: INTERNAL MEDICINE

## 2019-04-30 NOTE — TELEPHONE ENCOUNTER
Patient not seen since 9/28/17.    Follow up 5/10/19.    Argelia Maciel LPN  Diabetes Clinic Coordinator   Adult Endocrinology and Pediatric Specialty Clinics  [facility]

## 2019-04-30 NOTE — TELEPHONE ENCOUNTER
"Note from pharmacy states \"patient wants to get prescription for Accu-chek verónica strips from [facility]. Need new prescription for 3 times daily testing as this is what medicare will pay for.please indicare the diagnosis code per medicare\"    Faxed refill request received from   [facility]/pharmacy #[id number] - [address]  [place] [address]  Phone: [phone] Fax: [phone].   Medication Requested: Accu-chek Verónica strips  Directions: requesting sig - three times daily testing  Quantity: 300  Last Office Visit: 9/28/17  Next Appointment Scheduled for: 5/10/19    Yajaira Rajan CMA  Adult Endocrinology  [facility]       "

## 2019-05-01 NOTE — TELEPHONE ENCOUNTER
Patient's home care nurse, Meredith, called to follow-up and determine if refill for test strips had been received and completed. She noted patient was without test strips. Confirmed that prescription was sent yesterday, 4/30/19. Meredith verbalizes understanding.      Anahy Bloom, RN  Endocrine Care Coordinator  [facility]

## 2019-05-07 ENCOUNTER — ANCILLARY PROCEDURE (OUTPATIENT)
Dept: GENERAL RADIOLOGY | Facility: CLINIC | Age: 64
End: 2019-05-07
Attending: FAMILY MEDICINE
Payer: MEDICARE

## 2019-05-07 ENCOUNTER — OFFICE VISIT (OUTPATIENT)
Dept: ORTHOPEDICS | Facility: CLINIC | Age: 64
End: 2019-05-07
Payer: MEDICARE

## 2019-05-07 VITALS
DIASTOLIC BLOOD PRESSURE: 66 MMHG | HEART RATE: 71 BPM | SYSTOLIC BLOOD PRESSURE: 106 MMHG | BODY MASS INDEX: 25.42 KG/M2 | WEIGHT: 139 LBS

## 2019-05-07 DIAGNOSIS — M17.0 PRIMARY OSTEOARTHRITIS OF BOTH KNEES: Primary | ICD-10-CM

## 2019-05-07 DIAGNOSIS — M17.0 PRIMARY OSTEOARTHRITIS OF BOTH KNEES: ICD-10-CM

## 2019-05-07 PROCEDURE — 20610 DRAIN/INJ JOINT/BURSA W/O US: CPT | Mod: 50 | Performed by: FAMILY MEDICINE

## 2019-05-07 PROCEDURE — 73564 X-RAY EXAM KNEE 4 OR MORE: CPT | Mod: LT | Performed by: RADIOLOGY

## 2019-05-07 PROCEDURE — 99207 ZZC DROP WITH A PROCEDURE: CPT | Performed by: FAMILY MEDICINE

## 2019-05-07 RX ORDER — TRIAMCINOLONE ACETONIDE 40 MG/ML
40 INJECTION, SUSPENSION INTRA-ARTICULAR; INTRAMUSCULAR
Status: DISCONTINUED | OUTPATIENT
Start: 2019-05-07 | End: 2022-09-14

## 2019-05-07 RX ADMIN — TRIAMCINOLONE ACETONIDE 40 MG: 40 INJECTION, SUSPENSION INTRA-ARTICULAR; INTRAMUSCULAR at 11:02

## 2019-05-07 ASSESSMENT — PAIN SCALES - GENERAL: PAINLEVEL: WORST PAIN (10)

## 2019-05-07 NOTE — PROGRESS NOTES
Large Joint Injection/Arthocentesis: bilateral knee  Date/Time: 5/7/2019 11:02 AM  Performed by: Santy Harrington DO  Authorized by: Santy Harrington DO     Indications:  Pain  Needle Size:  25 G  Approach:  Lateral  Location:  Knee  Laterality:  Bilateral      Medications (Right):  40 mg triamcinolone 40 MG/ML  Medications (Left):  40 mg triamcinolone 40 MG/ML  Outcome:  Tolerated well, no immediate complications  Procedure discussed: discussed risks, benefits, and alternatives    Consent Given by:  Patient  Timeout: timeout called immediately prior to procedure    Prep: patient was prepped and draped in usual sterile fashion      HISTORY OF PRESENT ILLNESS  Ms. Muir is a pleasant 64 year old year old female following up with left knee osteoarthritis.  Holly is here for bilateral knee injections.  I injected her left knee in the past, once in late 2017 and again in August of last year.  Her right knee pain is new to me, it has been bothering her for years but never as much as her left knee.  Both hurt worse when walking, especially with taking steps both up and down.  Pain is just underneath her kneecap, slightly laterally.  Left is worse than right.     PHYSICAL EXAM  Vitals:    05/07/19 1050   BP: 106/66   Pulse: 71   Weight: 63 kg (139 lb)     General  - normal appearance, in no obvious distress  CV  - normal popliteal pulse  Pulm  - normal respiratory pattern, non-labored  Musculoskeletal - right and left knee  - stance: mildly antalgic gait  - inspection: generalized swelling bilaterally, no effusion  - palpation: lateral patellar facet tenderness bilaterally  - ROM: 100 degrees flexion, 0 degrees extension, painful active ROM    Neuro  - no sensory or motor deficit, grossly normal coordination, normal muscle tone  Skin  - no ecchymosis, erythema, warmth, or induration, no obvious rash  Psych  - interactive, appropriate, normal mood and affect          ASSESSMENT & PLAN  Ms. Muir is a 64 year old year  old female following up with left knee osteoarthritis.  She also has new onset right knee pain.    I ordered & reviewed x-rays of her knees which do show tricompartmental OA.    Holly and I revisited our discussion centering around the spectrum of treatment options for osteoarthritis that preclude surgery.  We discussed nonoperative treatment with pain relievers, icing, low impact exercise, and weight loss.  We also talked about the role of injection therapy with corticosteroids and hyaluronic acid, as well as the potential role of biologics.    I did inject her knees today (see procedure note).  This is something we can follow-up for 3-4 times a year, as helpful.    It was a pleasure seeing Holly.    PROCEDURE    Knee Injection - Intraarticular  The patient was informed of the risks and the benefits of the procedure and a written consent was signed.  The patient s left knee was prepped with chlorhexidine in sterile fashion.   40 mg of triamcinolone suspension was drawn up into a 5 mL syringe with 2 mL of 1% lidocaine and 2 mL of 0.5% marcaine.  Injection was performed using substerile technique.  A 1.5-inch 25-gauge needle was used to enter the lateral aspect of the left knee.  Injection performed successfully without difficulty.  There were no complications. The patient tolerated the procedure well. There was negligible bleeding.   The patient s right knee was prepped with chlorhexidine in sterile fashion.   40 mg of triamcinolone suspension was drawn up into a 5 mL syringe with 2 mL of 1% lidocaine and 2 mL of 0.5% marcaine.  Injection was performed using substerile technique.  A 1.5-inch 25-gauge needle was used to enter the lateral aspect of the right knee.  Injection performed successfully without difficulty.  There were no complications. The patient tolerated the procedure well. There was negligible bleeding.   The patient was instructed to ice the knee upon leaving clinic and refrain from overuse over the  next 3 days.   The patient was instructed to call or go to the emergency room with any unusual pain, swelling, redness, or if otherwise concerned.      Santy Harrington DO, CAQSM            [facility] FOLLOW-UP VISIT 5/7/2019    Holly Muir's chief complaint for this visit includes:  Chief Complaint   Patient presents with     Procedure     bilateral knee joint cortsone injections, last injection for left knee 12/2018.      PCP: Tiffany King    Referring Provider:  No referring provider defined for this encounter.    /66   Pulse 71   Wt 63 kg (139 lb)   BMI 25.42 kg/m    Worst Pain (10)       Interval History:     Follow up reason: bilateral knee cortisone injecitons    Following Therapeutic Plan: Yes     Pain: Worsening    Function: Worsening      Medical History:    Any recent changes to your medical history? No    Any new medication prescribed since last visit? No    Review of Systems:    Do you have fever, chills, weight loss? No    Do you have any vision problems? No    Do you have any chest pain or edema? No    Do you have any shortness of breath or wheezing?  No    Do you have stomach problems? No    Do you have any numbness or focal weakness? No    Do you have diabetes? Yes,     Do you have problems with bleeding or clotting? No    Do you have an rashes or other skin lesions? No

## 2019-05-07 NOTE — LETTER
5/7/2019         RE: Holly Muir  [id number] [address] [id number]        Dear Colleague,    Thank you for referring your patient, Holly Muir, to the [facility]. Please see a copy of my visit note below.    Large Joint Injection/Arthocentesis: bilateral knee  Date/Time: 5/7/2019 11:02 AM  Performed by: Santy Harrington DO  Authorized by: Santy Harrington DO     Indications:  Pain  Needle Size:  25 G  Approach:  Lateral  Location:  Knee  Laterality:  Bilateral      Medications (Right):  40 mg triamcinolone 40 MG/ML  Medications (Left):  40 mg triamcinolone 40 MG/ML  Outcome:  Tolerated well, no immediate complications  Procedure discussed: discussed risks, benefits, and alternatives    Consent Given by:  Patient  Timeout: timeout called immediately prior to procedure    Prep: patient was prepped and draped in usual sterile fashion      HISTORY OF PRESENT ILLNESS  Ms. Muir is a pleasant 64 year old year old female following up with left knee osteoarthritis.  Holly is here for bilateral knee injections.  I injected her left knee in the past, once in late 2017 and again in August of last year.  Her right knee pain is new to me, it has been bothering her for years but never as much as her left knee.  Both hurt worse when walking, especially with taking steps both up and down.  Pain is just underneath her kneecap, slightly laterally.  Left is worse than right.     PHYSICAL EXAM  Vitals:    05/07/19 1050   BP: 106/66   Pulse: 71   Weight: 63 kg (139 lb)     General  - normal appearance, in no obvious distress  CV  - normal popliteal pulse  Pulm  - normal respiratory pattern, non-labored  Musculoskeletal - right and left knee  - stance: mildly antalgic gait  - inspection: generalized swelling bilaterally, no effusion  - palpation: lateral patellar facet tenderness bilaterally  - ROM: 100 degrees flexion, 0 degrees extension, painful active ROM    Neuro  - no sensory or  motor deficit, grossly normal coordination, normal muscle tone  Skin  - no ecchymosis, erythema, warmth, or induration, no obvious rash  Psych  - interactive, appropriate, normal mood and affect          ASSESSMENT & PLAN  Ms. Muir is a 64 year old year old female following up with left knee osteoarthritis.  She also has new onset right knee pain.    I ordered & reviewed x-rays of her knees which do show tricompartmental OA.    Holly and I revisited our discussion centering around the spectrum of treatment options for osteoarthritis that preclude surgery.  We discussed nonoperative treatment with pain relievers, icing, low impact exercise, and weight loss.  We also talked about the role of injection therapy with corticosteroids and hyaluronic acid, as well as the potential role of biologics.    I did inject her knees today (see procedure note).  This is something we can follow-up for 3-4 times a year, as helpful.    It was a pleasure seeing Holly.    PROCEDURE    Knee Injection - Intraarticular  The patient was informed of the risks and the benefits of the procedure and a written consent was signed.  The patient s left knee was prepped with chlorhexidine in sterile fashion.   40 mg of triamcinolone suspension was drawn up into a 5 mL syringe with 2 mL of 1% lidocaine and 2 mL of 0.5% marcaine.  Injection was performed using substerile technique.  A 1.5-inch 25-gauge needle was used to enter the lateral aspect of the left knee.  Injection performed successfully without difficulty.  There were no complications. The patient tolerated the procedure well. There was negligible bleeding.   The patient s right knee was prepped with chlorhexidine in sterile fashion.   40 mg of triamcinolone suspension was drawn up into a 5 mL syringe with 2 mL of 1% lidocaine and 2 mL of 0.5% marcaine.  Injection was performed using substerile technique.  A 1.5-inch 25-gauge needle was used to enter the lateral aspect of the right knee.   Injection performed successfully without difficulty.  There were no complications. The patient tolerated the procedure well. There was negligible bleeding.   The patient was instructed to ice the knee upon leaving clinic and refrain from overuse over the next 3 days.   The patient was instructed to call or go to the emergency room with any unusual pain, swelling, redness, or if otherwise concerned.      Santy Harrington DO, CAQSM            [facility] FOLLOW-UP VISIT 5/7/2019    Holly Muir's chief complaint for this visit includes:  Chief Complaint   Patient presents with     Procedure     bilateral knee joint cortsone injections, last injection for left knee 12/2018.      PCP: Tiffany King    Referring Provider:  No referring provider defined for this encounter.    /66   Pulse 71   Wt 63 kg (139 lb)   BMI 25.42 kg/m     Worst Pain (10)       Interval History:     Follow up reason: bilateral knee cortisone injecitons    Following Therapeutic Plan: Yes     Pain: Worsening    Function: Worsening      Medical History:    Any recent changes to your medical history? No    Any new medication prescribed since last visit? No    Review of Systems:    Do you have fever, chills, weight loss? No    Do you have any vision problems? No    Do you have any chest pain or edema? No    Do you have any shortness of breath or wheezing?  No    Do you have stomach problems? No    Do you have any numbness or focal weakness? No    Do you have diabetes? Yes,     Do you have problems with bleeding or clotting? No    Do you have an rashes or other skin lesions? No      Again, thank you for allowing me to participate in the care of your patient.        Sincerely,        Santy Harrington DO

## 2019-05-07 NOTE — NURSING NOTE
Prior to injection, verified patient identity using patient's name and date of birth.  Due to injection administration, patient instructed to remain in clinic for 15 minutes  afterwards, and to report any adverse reaction to me immediately.    Joint injection was performed.      Drug Amount Wasted:  None.  Vial/Syringe: Single dose vial  Expiration Date:  1/1/21  NDC [address]-3    Drug Amount Wasted:  None.  Vial/Syringe: Single dose vial  Expiration Date:  1/1/21  NDC [address]-2

## 2019-05-08 ENCOUNTER — TELEPHONE (OUTPATIENT)
Dept: ENDOCRINOLOGY | Facility: CLINIC | Age: 64
End: 2019-05-08

## 2019-05-08 NOTE — TELEPHONE ENCOUNTER
Patient is calling because she has run out of test strips and hasn't been able to test. Patient is wondering how she can get her strips covered for 6 times daily testing by medicare? Does she need to use a different pharmacy? [facility] sent a message saying through them medicare will only cover 3 times daily but the patient is testing about 6 times daily. Please advise.    Yajaira Rajan, [facility]  Adult Endocrinology  [facility]

## 2019-05-08 NOTE — TELEPHONE ENCOUNTER
Writer phoned pharmacy to review.    Pharmacy can fill prescription for #300 strips until seen 5/10.    Patient updated.    Argelia Maciel LPN  Diabetes Clinic Coordinator   Adult Endocrinology and Pediatric Specialty Clinics  [facility]

## 2019-05-10 ENCOUNTER — OFFICE VISIT (OUTPATIENT)
Dept: ENDOCRINOLOGY | Facility: CLINIC | Age: 64
End: 2019-05-10
Payer: MEDICARE

## 2019-05-10 VITALS
HEART RATE: 75 BPM | WEIGHT: 194 LBS | HEIGHT: 62 IN | OXYGEN SATURATION: 95 % | SYSTOLIC BLOOD PRESSURE: 112 MMHG | DIASTOLIC BLOOD PRESSURE: 70 MMHG | BODY MASS INDEX: 35.7 KG/M2

## 2019-05-10 DIAGNOSIS — E88.819 INSULIN RESISTANCE: ICD-10-CM

## 2019-05-10 DIAGNOSIS — Z91.148 POOR COMPLIANCE WITH MEDICATION: ICD-10-CM

## 2019-05-10 DIAGNOSIS — Z79.4 UNCONTROLLED TYPE 2 DIABETES MELLITUS WITH HYPERGLYCEMIA, WITH LONG-TERM CURRENT USE OF INSULIN (H): ICD-10-CM

## 2019-05-10 DIAGNOSIS — E11.65 UNCONTROLLED TYPE 2 DIABETES MELLITUS WITH HYPERGLYCEMIA, WITH LONG-TERM CURRENT USE OF INSULIN (H): ICD-10-CM

## 2019-05-10 LAB — HBA1C MFR BLD: 11.9 % (ref 0–5.6)

## 2019-05-10 PROCEDURE — 83036 HEMOGLOBIN GLYCOSYLATED A1C: CPT | Performed by: INTERNAL MEDICINE

## 2019-05-10 PROCEDURE — 36415 COLL VENOUS BLD VENIPUNCTURE: CPT | Performed by: INTERNAL MEDICINE

## 2019-05-10 PROCEDURE — 99214 OFFICE O/P EST MOD 30 MIN: CPT | Performed by: INTERNAL MEDICINE

## 2019-05-10 RX ORDER — PIOGLITAZONEHYDROCHLORIDE 45 MG/1
45 TABLET ORAL DAILY
Qty: 90 TABLET | Refills: 3 | Status: SHIPPED | OUTPATIENT
Start: 2019-05-10 | End: 2020-06-04

## 2019-05-10 ASSESSMENT — MIFFLIN-ST. JEOR: SCORE: 1391.5

## 2019-05-10 NOTE — NURSING NOTE
"Holly Muir's goals for this visit include:   Chief Complaint   Patient presents with     Diabetes       She requests these members of her care team be copied on today's visit information: Yes    PCP: Tiffany King    Referring Provider:  Tiffany King MD PhD  [address] [place] [address]    /70 (BP Location: Left arm, Patient Position: Chair, Cuff Size: Adult Regular)   Pulse 75   Ht 1.58 m (5' 2.21\")   Wt 88 kg (194 lb 0.1 oz)   SpO2 95%   BMI 35.25 kg/m      Do you need any medication refills at today's visit? No    "

## 2019-05-10 NOTE — LETTER
5/10/2019         RE: Holly Muir  [address] [facility] [address]        Dear Colleague,    Thank you for referring your patient, Holly Muir, to the [facility]. Please see a copy of my visit note below.                                                      - Endocrinology Follow up -    Reason for visit/consult:  Uncontrolled type 2 diabetes mellitus with hyperglycemia, with long-term current use of insulin (H)    Primary care provider: Tiffany King      Assessment and Plan    DM2:   A1C 11.9, worsened, fasting 300 per patient, likely due to poor compliance both insulin as well as office visits. I also discussed plan and strategy with Mikayla today. We will do following changes.    - Continue Lantus 60 units daily  - Increase Novolog     Novolog 15-15-15      -Continue current metformin 1000 mg bid  - Increase Actos from 30 mg to 45 mg.     - Compliance/life style issue, Mikayla will try to reach out patient in a few weeks.     # Home support  Poor compliance could be mental health, life style, environment. Will ask her insurance whether increase home visiting from once a week to twice a week.    # Mental health  missign appotiment and questionable medication compliance     -RTC with me in 4 months    I spent 30 minutes with this patient face to face and explained the conditions and plans (more than 50% of time was counseling/coordination of care, compliance, life style) . The patient understood and is satisfied with today's visit. Return to clinic with me in 4 months.         Elizabeth Medrano MD  Staff Physician  Endocrinology and Metabolism  License: [id number]      Current Diabetic Regimens:  Basaglar 60 units daily  NovoLog 10-12 each meal (new 15 units each meal from May 2019)  Metformin 1000 twice daily  Actos 30 mg daily (new 45 mg daily from May 2019)    Life Style:  Wake up 10-11 am  Breakfast after  Lunch 1-2 pm: sandwich  Dinner 6 pm: mexican fat foods  Bedtime: 9  pm    Exercise:  walking    DM complications:  Retinopathy: due not been for 1-2 years.   Nephropathy:   Neuropathy:   Most recent LDL:   LDL Cholesterol Calculated   Date Value Ref Range Status   12/14/2018 25 <100 mg/dL Final     Comment:     Desirable:       <100 mg/dl       Glucose Log:she forgot to bring.     Interval History: Patient came by herself she missed appointment, increasing the dose of insulin, mentioned started to have a glucose increase 300 in the fasting in the morning her mental issue.  Seen by new psychiatrist. Visitign nurse once a week.    HPI: A 63 yo female second follow up DM2, she has remote history of colectomy. This is the third visit.   Used to use V go for several month, she was confused the system, worsened A1C, then we switch back to insulin injection regimens.    Currently lantus 38 utnis and novology 6-8-8 and sliding scale and metformin 1000 bid.   Last visit prscribed actos 30 mg daily, however she mentioned, she is not sure which one is actos and she may not taking actos.      Of note,  she has had sleeping issues, which distracts her life significantly. She cannot sleep at night, usually sleeps in the day, during the night, she has been hungry and eating peanuts butters, jelly, fruits etc.   Sometimes she cannot sleep for 2 days.   She has visiting nurse once a week, yesterday the nurse called our clinic that her glucose was 500. Talked with Mikayla, lantus was increased from 38 to 42 units.     Glucose: she forgot to bring glucometer today.     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     Past Surgical History:    Procedure Laterality Date     ARTHROSCOPY KNEE RT/LT      LT     BREAST LUMPECTOMY, RT/LT      LT-benign      C EXCIS PTERYGIUM  10/08    [facility]     C STOMACH SURGERY PROCEDURE UNLISTED       COLONOSCOPY  5/10/2012    Procedure:COLONOSCOPY; screening colonoscopy; Surgeon:MILLA VEGA; Location:[facility]     COLOSTOMY  2000     COLOSTOMY       HC COLONOSCOPY THRU STOMA, DIAGNOSTIC  2002    normal per report-no records available-records destroyed     HYSTERECTOMY, PAP NO LONGER INDICATED      done in [place]-cervical cancer     IMPLANT STIMULATOR SACRAL NERVE STAGE ONE Right 3/10/2015    Procedure: IMPLANT STIMULATOR SACRAL NERVE STAGE ONE;  Surgeon: Teodora Yusuf MD;  Location: [facility] OR     IMPLANT STIMULATOR SACRAL NERVE STAGE TWO Right 3/31/2015    Procedure: IMPLANT STIMULATOR SACRAL NERVE STAGE TWO;  Surgeon: Teodora Yusuf MD;  Location: [facility] OR     KNEE SURGERY       SALPINGO OOPHORECTOMY,R/L/JNENIFER      Salpingo Oophorectomy, RT/LT/JENNIFER       Allergies:  Allergies   Allergen Reactions     Morphine Sulfate Itching       Current Medications   Current Outpatient Medications   Medication     ARIPiprazole (ABILIFY) 20 MG tablet     ARIPiprazole (ABILIFY) 5 MG tablet     aspirin ([employer] ASPIRIN ADULT LOW DOSE) 81 MG chewable tablet     BASAGLAR 100 UNIT/ML injection     blood glucose (ACCU-CHEK CELIA) test strip     blood glucose monitoring (ACCU-CHEK CELIA PLUS) meter device kit     blood glucose monitoring (ACCU-CHEK FASTCLIX) lancets     buPROPion (WELLBUTRIN XL) 150 MG 24 hr tablet     busPIRone HCl (BUSPAR) 30 MG tablet     Cholecalciferol (VITAMIN D) 2000 units tablet     gabapentin (NEURONTIN) 300 MG capsule     ibuprofen (ADVIL/MOTRIN) 800 MG tablet     insulin aspart (NOVOLOG FLEXPEN) 100 UNIT/ML pen     insulin pen needle 31G X 5 MM     metFORMIN (GLUCOPHAGE) 1000 MG tablet     Multiple Vitamin (DAILY-ISAAC) TABS     nystatin (MYCOSTATIN) 367639 UNIT/GM POWD     omeprazole (PRILOSEC) 20 MG CR  "capsule     ORDER FOR DME     PARoxetine (PAXIL) 20 MG tablet     pioglitazone (ACTOS) 30 MG tablet     propranolol ER (INDERAL LA) 60 MG 24 hr capsule     ramipril (ALTACE) 5 MG capsule     simvastatin (ZOCOR) 20 MG tablet     traMADol (ULTRAM) 50 MG tablet     UNIVERSAL REMOVER WIPES EX MISC     albuterol (PROAIR HFA/PROVENTIL HFA/VENTOLIN HFA) 108 (90 Base) MCG/ACT inhaler     BASAGLAR 100 UNIT/ML injection     order for DME     order for DME     order for DME     order for DME     ORDER FOR DME     Current Facility-Administered Medications   Medication     triamcinolone (KENALOG-40) injection 40 mg     triamcinolone (KENALOG-40) injection 40 mg       Family History:  Family History   Problem Relation Age of Onset     Diabetes Mother      Cerebrovascular Disease Mother      Diabetes Father      Hypertension Father      Cancer Maternal Grandfather      Cancer Paternal Grandfather      Diabetes Brother      Diabetes Sister      Thyroid Disease Daughter      Thyroid Disease Sister      Multiple Sclerosis Daughter      Glaucoma No family hx of      Macular Degeneration No family hx of        Social History:  Social History     Tobacco Use     Smoking status: Former Smoker     Last attempt to quit: 2016     Years since quittin.7     Smokeless tobacco: Never Used   Substance Use Topics     Alcohol use: Yes     Alcohol/week: 0.0 oz     Comment: social occasions       ROS:  Full review of systems taken with the help of the intake sheet. Otherwise a complete 14 point review of systems was taken and is negative unless stated in the history above.    Physical Exam:   Blood pressure 112/70, pulse 75, height 1.58 m (5' 2.21\"), weight 88 kg (194 lb 0.1 oz), SpO2 95 %, not currently breastfeeding.  General: well appearing, no acute distress, pleasant and conversant,   Mental Status/neuro: alert and oriented  Face: symmetrical, normal facial color  Eyes: anicteric, PERRL, no proptosis or lid lag  Neck: suppler, no " lymphadenopahty  Thyroid: normal size and texture, no nodule palpable, no bruits  Heart: regular rhythm, S1S2, no murmur appreciated  Lung: clear to auscultation bilaterally  Abdomen: soft, NT/ND, no hepatomegaly  Legs: no swelling or edema  Feet: clean, no deformities or ulcers, 2+ DP pulses, first and second digits mildly decreased sensation      Labs (General):   Lab Results   Component Value Date     12/19/2016      Lab Results   Component Value Date    POTASSIUM 4.3 12/19/2016     Lab Results   Component Value Date    CHLORIDE 102 12/19/2016     Lab Results   Component Value Date    NICOLÁS 8.8 12/19/2016     Lab Results   Component Value Date    CO2 22 12/19/2016     Lab Results   Component Value Date    BUN 15 12/19/2016     Lab Results   Component Value Date    CR 0.88 12/19/2016     Lab Results   Component Value Date     12/19/2016     Lab Results   Component Value Date    TSH 1.24 12/19/2016     No results found for: T4  Lab Results   Component Value Date    A1C 12.0 05/26/2017           Again, thank you for allowing me to participate in the care of your patient.        Sincerely,        Eilzabeth Medrano MD

## 2019-05-10 NOTE — PROGRESS NOTES
- Endocrinology Follow up -    Reason for visit/consult:  Uncontrolled type 2 diabetes mellitus with hyperglycemia, with long-term current use of insulin (H)    Primary care provider: Tiffany King      Assessment and Plan    DM2:   A1C 11.9, worsened, fasting 300 per patient, likely due to poor compliance both insulin as well as office visits. I also discussed plan and strategy with Mikayla today. We will do following changes.    - Continue Lantus 60 units daily  - Increase Novolog     Novolog 15-15-15      -Continue current metformin 1000 mg bid  - Increase Actos from 30 mg to 45 mg.     - Compliance/life style issue, Mikayla will try to reach out patient in a few weeks.     # Home support  Poor compliance could be mental health, life style, environment. Will ask her insurance whether increase home visiting from once a week to twice a week.    # Mental health  missign appotiment and questionable medication compliance     -RTC with me in 4 months    I spent 30 minutes with this patient face to face and explained the conditions and plans (more than 50% of time was counseling/coordination of care, compliance, life style) . The patient understood and is satisfied with today's visit. Return to clinic with me in 4 months.         Elizabeth Medrano MD  Staff Physician  Endocrinology and Metabolism  License: [id number]      Current Diabetic Regimens:  Basaglar 60 units daily  NovoLog 10-12 each meal (new 15 units each meal from May 2019)  Metformin 1000 twice daily  Actos 30 mg daily (new 45 mg daily from May 2019)    Life Style:  Wake up 10-11 am  Breakfast after  Lunch 1-2 pm: sandwich  Dinner 6 pm: mexican fat foods  Bedtime: 9 pm    Exercise:  walking    DM complications:  Retinopathy: due not been for 1-2 years.   Nephropathy:   Neuropathy:   Most recent LDL:   LDL Cholesterol Calculated   Date Value Ref Range Status   12/14/2018 25 <100 mg/dL Final     Comment:     Desirable:        <100 mg/dl       Glucose Log:she forgot to bring.     Interval History: Patient came by herself she missed appointment, increasing the dose of insulin, mentioned started to have a glucose increase 300 in the fasting in the morning her mental issue.  Seen by new psychiatrist. Visitign nurse once a week.    HPI: A 63 yo female second follow up DM2, she has remote history of colectomy. This is the third visit.   Used to use V go for several month, she was confused the system, worsened A1C, then we switch back to insulin injection regimens.    Currently lantus 38 utnis and novology 6-8-8 and sliding scale and metformin 1000 bid.   Last visit prscribed actos 30 mg daily, however she mentioned, she is not sure which one is actos and she may not taking actos.      Of note,  she has had sleeping issues, which distracts her life significantly. She cannot sleep at night, usually sleeps in the day, during the night, she has been hungry and eating peanuts butters, jelly, fruits etc.   Sometimes she cannot sleep for 2 days.   She has visiting nurse once a week, yesterday the nurse called our clinic that her glucose was 500. Talked with Mikayla, lantus was increased from 38 to 42 units.     Glucose: she forgot to bring glucometer today.     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     Past Surgical History:   Procedure Laterality Date     ARTHROSCOPY KNEE RT/LT      LT     BREAST LUMPECTOMY, RT/LT      LT-benign      C EXCIS PTERYGIUM  10/08    [facility]     C STOMACH SURGERY PROCEDURE UNLISTED       COLONOSCOPY  5/10/2012    Procedure:COLONOSCOPY; screening  colonoscopy; Surgeon:MILLA VEGA; Location:[facility]     COLOSTOMY  2000     COLOSTOMY       HC COLONOSCOPY THRU STOMA, DIAGNOSTIC  2002    normal per report-no records available-records destroyed     HYSTERECTOMY, PAP NO LONGER INDICATED      done in [place]-cervical cancer     IMPLANT STIMULATOR SACRAL NERVE STAGE ONE Right 3/10/2015    Procedure: IMPLANT STIMULATOR SACRAL NERVE STAGE ONE;  Surgeon: Teodora Yusuf MD;  [facility]: [facility]     IMPLANT STIMULATOR SACRAL NERVE STAGE TWO Right 3/31/2015    Procedure: IMPLANT STIMULATOR SACRAL NERVE STAGE TWO;  Surgeon: Teodora Yusuf MD;  Location: [facility] OR     KNEE SURGERY       SALPINGO OOPHORECTOMY,R/L/JENNIFER      Salpingo Oophorectomy, RT/LT/JENNIFER       Allergies:  Allergies   Allergen Reactions     Morphine Sulfate Itching       Current Medications   Current Outpatient Medications   Medication     ARIPiprazole (ABILIFY) 20 MG tablet     ARIPiprazole (ABILIFY) 5 MG tablet     aspirin ([employer]S ASPIRIN ADULT LOW DOSE) 81 MG chewable tablet     BASAGLAR 100 UNIT/ML injection     blood glucose (ACCU-CHEK CELIA) test strip     blood glucose monitoring (ACCU-CHEK CELIA PLUS) meter device kit     blood glucose monitoring (ACCU-CHEK FASTCLIX) lancets     buPROPion (WELLBUTRIN XL) 150 MG 24 hr tablet     busPIRone HCl (BUSPAR) 30 MG tablet     Cholecalciferol (VITAMIN D) 2000 units tablet     gabapentin (NEURONTIN) 300 MG capsule     ibuprofen (ADVIL/MOTRIN) 800 MG tablet     insulin aspart (NOVOLOG FLEXPEN) 100 UNIT/ML pen     insulin pen needle 31G X 5 MM     metFORMIN (GLUCOPHAGE) 1000 MG tablet     Multiple Vitamin (DAILY-ISAAC) TABS     nystatin (MYCOSTATIN) 674011 UNIT/GM POWD     omeprazole (PRILOSEC) 20 MG CR capsule     ORDER FOR DME     PARoxetine (PAXIL) 20 MG tablet     pioglitazone (ACTOS) 30 MG tablet     propranolol ER (INDERAL LA) 60 MG 24 hr capsule     ramipril (ALTACE) 5 MG capsule     simvastatin (ZOCOR) 20 MG tablet     traMADol (ULTRAM) 50 MG tablet      "UNIVERSAL REMOVER WIPES EX MISC     albuterol (PROAIR HFA/PROVENTIL HFA/VENTOLIN HFA) 108 (90 Base) MCG/ACT inhaler     BASAGLAR 100 UNIT/ML injection     order for DME     order for DME     order for DME     order for DME     ORDER FOR DME     Current Facility-Administered Medications   Medication     triamcinolone (KENALOG-40) injection 40 mg     triamcinolone (KENALOG-40) injection 40 mg       Family History:  Family History   Problem Relation Age of Onset     Diabetes Mother      Cerebrovascular Disease Mother      Diabetes Father      Hypertension Father      Cancer Maternal Grandfather      Cancer Paternal Grandfather      Diabetes Brother      Diabetes Sister      Thyroid Disease Daughter      Thyroid Disease Sister      Multiple Sclerosis Daughter      Glaucoma No family hx of      Macular Degeneration No family hx of        Social History:  Social History     Tobacco Use     Smoking status: Former Smoker     Last attempt to quit: 2016     Years since quittin.7     Smokeless tobacco: Never Used   Substance Use Topics     Alcohol use: Yes     Alcohol/week: 0.0 oz     Comment: social occasions       ROS:  Full review of systems taken with the help of the intake sheet. Otherwise a complete 14 point review of systems was taken and is negative unless stated in the history above.    Physical Exam:   Blood pressure 112/70, pulse 75, height 1.58 m (5' 2.21\"), weight 88 kg (194 lb 0.1 oz), SpO2 95 %, not currently breastfeeding.  General: well appearing, no acute distress, pleasant and conversant,   Mental Status/neuro: alert and oriented  Face: symmetrical, normal facial color  Eyes: anicteric, PERRL, no proptosis or lid lag  Neck: suppler, no lymphadenopahty  Thyroid: normal size and texture, no nodule palpable, no bruits  Heart: regular rhythm, S1S2, no murmur appreciated  Lung: clear to auscultation bilaterally  Abdomen: soft, NT/ND, no hepatomegaly  Legs: no swelling or edema  Feet: clean, no deformities " or ulcers, 2+ DP pulses, first and second digits mildly decreased sensation      Labs (General):   Lab Results   Component Value Date     12/19/2016      Lab Results   Component Value Date    POTASSIUM 4.3 12/19/2016     Lab Results   Component Value Date    CHLORIDE 102 12/19/2016     Lab Results   Component Value Date    NICOLÁS 8.8 12/19/2016     Lab Results   Component Value Date    CO2 22 12/19/2016     Lab Results   Component Value Date    BUN 15 12/19/2016     Lab Results   Component Value Date    CR 0.88 12/19/2016     Lab Results   Component Value Date     12/19/2016     Lab Results   Component Value Date    TSH 1.24 12/19/2016     No results found for: T4  Lab Results   Component Value Date    A1C 12.0 05/26/2017

## 2019-05-15 ENCOUNTER — DOCUMENTATION ONLY (OUTPATIENT)
Dept: PEDIATRICS | Facility: CLINIC | Age: 64
End: 2019-05-15

## 2019-05-15 NOTE — PROGRESS NOTES
[facility] utilizes an encounter to take the place of a direct phone call to your office. Please take a moment to review the below request. Please reply or route message to author of this encounter.  Message will act as a verbal OK of orders requested below. Thank you.       MD SUMMARY/PLAN OF CARE  Patient had fall 5/12/19, no injury    Patient reports she tripped on the kitchen mat on Monday morning and fell forward and landed on her hands and knees on the floor.  No injury noted.  MD notified    EVELYN Orellana  [phone]  [email or web address]

## 2019-05-24 ENCOUNTER — APPOINTMENT (OUTPATIENT)
Dept: EDUCATION SERVICES | Facility: CLINIC | Age: 64
End: 2019-05-24
Payer: MEDICARE

## 2019-05-24 ENCOUNTER — TELEPHONE (OUTPATIENT)
Dept: ENDOCRINOLOGY | Facility: CLINIC | Age: 64
End: 2019-05-24

## 2019-05-24 NOTE — TELEPHONE ENCOUNTER
Pt called back. States she is on her way to [facility] for the weekend and bg meter is not available but remembers fasting bg this morning was 135. States since last vist with Endo bg leves have improved with increase in insulin doses.   Pt encouraged to call Cde if in the future she feels insulin dose changes need to be made. Pt verbalized understanding.     Mikayla Sellers RN, BSN, LEORA   [facility]  ----------------------------------------------------------------------------------------------------------------    Cde called pt for a bg report. Left vm asking  Pt to call [phone] to report bg levels.    Mikayla Sellers RN, BSN, JOE   [facility]

## 2019-05-27 DIAGNOSIS — R25.1 TREMOR: ICD-10-CM

## 2019-05-28 ENCOUNTER — OFFICE VISIT (OUTPATIENT)
Dept: AUDIOLOGY | Facility: CLINIC | Age: 64
End: 2019-05-28
Payer: MEDICARE

## 2019-05-28 DIAGNOSIS — H90.3 SENSORINEURAL HEARING LOSS (SNHL) OF BOTH EARS: Primary | ICD-10-CM

## 2019-05-28 PROCEDURE — 92593 HC HEARING AID CHECK, BINAURAL: CPT | Performed by: AUDIOLOGIST

## 2019-05-28 RX ORDER — PROPRANOLOL HCL 60 MG
CAPSULE, EXTENDED RELEASE 24HR ORAL
Qty: 60 CAPSULE | Refills: 0 | Status: SHIPPED | OUTPATIENT
Start: 2019-05-28 | End: 2019-06-27

## 2019-05-28 NOTE — PROGRESS NOTES
AUDIOLOGY REPORT    BACKGROUND INFORMATION: Holly Muir was seen in the Audiology Clinic  at [facility] on 5/28/2019 for follow-up.  The patient has been seen previously in this clinic on 9/01/2015 and results revealed a mild to moderate sensorineural hearing loss bilaterally.  The patient was fit with Phonak Audeo  hearing aids on 9/28/2015.  The patient reports that her hearing aids are not working.    TEST RESULTS AND PROCEDURES: Wax traps were found to be plugged and replaced. An electroacoustic hearing aid check was performed. The hearing aid conformity evaluation was completed. This includes initially evaluating the devices electroacoustically and later matching NAL-NL1 target with soft sounds audible, moderate sounds comfortable and clear, and loud sounds below discomfort.     SUMMARY AND RECOMMENDATIONS: A hearing aid check was performed today and the patient reports good sound following replacement of the wax traps.  Adjustments were made as noted above and the patient will return as needed or at least every 4-6 months for cleaning and assessment of hearing aid.  Call this clinic with questions regarding today s visit.    Cherei Powers.  Doctor of Audiology  [place] License # [id number]

## 2019-06-06 ENCOUNTER — OFFICE VISIT (OUTPATIENT)
Dept: DERMATOLOGY | Facility: CLINIC | Age: 64
End: 2019-06-06
Payer: MEDICARE

## 2019-06-06 DIAGNOSIS — B07.8 OTHER VIRAL WARTS: Primary | ICD-10-CM

## 2019-06-06 PROCEDURE — 17110 DESTRUCTION B9 LES UP TO 14: CPT | Performed by: DERMATOLOGY

## 2019-06-06 PROCEDURE — 11900 INJECT SKIN LESIONS </W 7: CPT | Mod: 59 | Performed by: DERMATOLOGY

## 2019-06-06 RX ORDER — CANDIDA ALBICANS 1000 [PNU]/ML
0.1 INJECTION, SOLUTION INTRADERMAL ONCE
Qty: 1 ML | Refills: 0 | OUTPATIENT
Start: 2019-06-06 | End: 2019-06-27

## 2019-06-06 ASSESSMENT — PAIN SCALES - GENERAL: PAINLEVEL: NO PAIN (0)

## 2019-06-06 NOTE — NURSING NOTE
@Holly Muir's goals for this visit include:   Chief Complaint   Patient presents with     Wart     bilateral hands - worsening       She requests these members of her care team be copied on today's visit information: NO    PCP: Tiffany King    Referring Provider:  Referred Self, MD  No address on file    There were no vitals taken for this visit.    Do you need any medication refills at today's visit? NO    [facility]

## 2019-06-06 NOTE — PATIENT INSTRUCTIONS
Start at home salicylic acid treatment, a week after cryotherapy treatment. Apply Wart Stick 40% (found on [employer]) at bedtime, cover with a Bandaid. Remove the Band-aid in the morning.     Cryotherapy    What is it?    Use of a very cold liquid, such as liquid nitrogen, to freeze and destroy abnormal skin cells that need to be removed    What should I expect?    Tenderness and redness    A small blister that might grow and fill with dark purple blood. There may be crusting.    More than one treatment may be needed if the lesions do not go away.    How do I care for the treated area?    Gently wash the area with your hands when bathing.    Use a thin layer of Vaseline to help with healing. You may use a Band-Aid.     The area should heal within 7-10 days and may leave behind a pink or lighter color.     Do not use an antibiotic or Neosporin ointment.     You may take acetaminophen (Tylenol) for pain.     Call your Doctor if you have:    Severe pain    Signs of infection (warmth, redness, cloudy yellow drainage, and or a bad smell)    Questions or concerns    Who should I call with questions?       [facility]: [phone]       [facility]: [phone]       For urgent needs outside of business hours call the [facility] at [phone]        and ask for the dermatology resident on call

## 2019-06-06 NOTE — LETTER
"    6/6/2019         RE: Holly Muir  [address] [facility] [id number]        Dear Colleague,    Thank you for referring your patient, Holly Muir, to the [facility]. Please see a copy of my visit note below.    [facility] Dermatology Note      Dermatology Problem List:  1. Verrucous vulgaris, hands  -s/p cryotherapy and candida injections 12/13/2018, 1/10/19, 3/14/2019, 6/6/2019  -home treatments with salicyclic acid    Encounter Date: Jun 6, 2019    CC:  Chief Complaint   Patient presents with     Wart     bilateral hands - worsening         History of Present Illness:  Ms. Holly Muir is a 64 year old female who presents as a follow for wart treatment. She was last seen by Dr. Harding 3/14/2019 when they were treated with cryotherapy and candida. She reports they are getting worse. Patient has not been back from a visit because she was taking care of her sick mother. Mom has since recovered. Patient still doing treatment with \"wart remover.\" She thinks this is sal acid based, but is unsure of the concentration. No new concerns today.    Past Medical History:   Patient Active Problem List   Diagnosis     Hyperlipidemia LDL goal <100     Severe major depression with psychotic features (H)     History of cervical cancer     S/P colostomy (H)     Mixed incontinence urge and stress     Memory loss     Type 2 diabetes mellitus, uncontrolled A1C goal <8%     Obesity     Pain in joint, hand     Trigger finger, acquired     Synovitis and tenosynovitis     Dermatochalasis     Presbyopia     OCD (obsessive compulsive disorder)     HTN, goal below 140/90     Cataracts, both eyes     Health Care Home     Lumbago     Sprain of lumbar region     Generalized osteoarthrosis, unspecified site     Rotator cuff impingement syndrome     Hypoglycemia due to insulin     Encounter for long-term (current) use of insulin (H)     Colostomy, evaluate (H)     Urinary " retention     ACP (advance care planning)     Bilateral low back pain without sciatica     Anxiety     Fecal incontinence due to anorectal disorder     Visual hallucination     Type 2 diabetes mellitus with hyperglycemia (H)     Chronic pain syndrome     Obesity (BMI 35.0-39.9) with comorbidity (H)     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     Past Surgical History:   Procedure Laterality Date     ARTHROSCOPY KNEE RT/LT      LT     BREAST LUMPECTOMY, RT/LT      LT-benign      C EXCIS PTERYGIUM  10/08    [facility]     C STOMACH SURGERY PROCEDURE UNLISTED       COLONOSCOPY  5/10/2012    Procedure:COLONOSCOPY; screening colonoscopy; Surgeon:MILLA VEGA; [facility]     COLOSTOMY  2000     COLOSTOMY       HC COLONOSCOPY THRU STOMA, DIAGNOSTIC  2002    normal per report-no records available-records destroyed     HYSTERECTOMY, PAP NO LONGER INDICATED      done in [place]-cervical cancer     IMPLANT STIMULATOR SACRAL NERVE STAGE ONE Right 3/10/2015    Procedure: IMPLANT STIMULATOR SACRAL NERVE STAGE ONE;  Surgeon: Teodora Yusuf MD;  [facility]     IMPLANT STIMULATOR SACRAL NERVE STAGE TWO Right 3/31/2015    Procedure: IMPLANT STIMULATOR SACRAL NERVE STAGE TWO;  Surgeon: Teodora Yusuf MD;  [facility]     KNEE SURGERY       SALPINGO OOPHORECTOMY,R/L/JENNIFER      Salpingo Oophorectomy, RT/LT/JENNIFER       Social History:  Patient reports that she quit smoking about 2 years ago. She has never used smokeless tobacco. She reports that she drinks alcohol. She reports that she does not use drugs. Patient is on disability. She has grandchildren.       Family  History:  Family History   Problem Relation Age of Onset     Diabetes Mother      Cerebrovascular Disease Mother      Diabetes Father      Hypertension Father      Cancer Maternal Grandfather      Cancer Paternal Grandfather      Diabetes Brother      Diabetes Sister      Thyroid Disease Daughter      Thyroid Disease Sister      Multiple Sclerosis Daughter      Glaucoma No family hx of      Macular Degeneration No family hx of        Medications:  Current Outpatient Medications   Medication Sig Dispense Refill     albuterol (PROAIR HFA/PROVENTIL HFA/VENTOLIN HFA) 108 (90 Base) MCG/ACT inhaler Inhale 2 puffs into the lungs every 6 hours 1 Inhaler 0     ARIPiprazole (ABILIFY) 20 MG tablet Take 1 tablet (20 mg) by mouth daily Along with 5mg tab to total 25mg.  Prescribed by Dr. Isael Jiménez at [facility]       ARIPiprazole (ABILIFY) 5 MG tablet Take 1 tablet (5 mg) by mouth At Bedtime Along with 5mg tab to total 25mg.  Prescribed by Dr. Isael Jiménez at [facility] 30 tablet 1     aspirin ([employer] ASPIRIN ADULT LOW DOSE) 81 MG chewable tablet TAKE 1 TABLET (81 MG) BY MOUTH DAILY 90 tablet 3     BASAGLAR 100 UNIT/ML injection Take 55 units daily. 60 mL 0     blood glucose (ACCU-CHEK CELIA) test strip Use to test blood sugars 3 times daily or as directed. 300 each 0     blood glucose monitoring (ACCU-CHEK CELIA PLUS) meter device kit Use to test blood sugars 4 times daily or as directed. 1 kit 0     blood glucose monitoring (ACCU-CHEK FASTCLIX) lancets Use to test blood sugar 6 times daily or as directed. 510 each 1     buPROPion (WELLBUTRIN XL) 150 MG 24 hr tablet TAKE 3 TABLETS (450 MG) BY MOUTH EVERY MORNING       busPIRone HCl (BUSPAR) 30 MG tablet TAKE 1 TABLET BY MOUTH TWICE A  tablet 1     Cholecalciferol (VITAMIN D) 2000 units tablet Take 2,000 Units by mouth daily 100 tablet 3     gabapentin (NEURONTIN) 300 MG capsule TAKE ONE CAPSULE BY MOUTH TWICE A DAY TAKE 2 CAPSULES BY MOUTH IN THE  EVENING 360 capsule 1     ibuprofen (ADVIL/MOTRIN) 800 MG tablet Take 1 tablet (800 mg) by mouth every 8 hours as needed for moderate pain (with food) 30 tablet 0     insulin aspart (NOVOLOG FLEXPEN) 100 UNIT/ML pen 15 unit subcutaneous per meal three times a day. 30 mL 5     insulin pen needle 31G X 5 MM Use four times 4 day times a day 400 each 1     metFORMIN (GLUCOPHAGE) 1000 MG tablet TAKE 1 TABLET BY MOUTH TWICE A DAY WITH MEALS 180 tablet 0     Multiple Vitamin (DAILY-ISAAC) TABS TAKE 1 TABLET BY MOUTH DAILY 30 tablet 11     nystatin (MYCOSTATIN) 502705 UNIT/GM POWD Apply 1 dose topically 3 times daily as needed 60 g 3     omeprazole (PRILOSEC) 20 MG CR capsule TAKE 2 CAPSULES BY MOUTH DAILY TAKE 30-60 MINUTES BEFORE A MEAL. 180 capsule 2     order for DME Equipment being ordered: grab bar for bath tub/shower. 1 Units 0     order for DME Equipment being ordered: railing for stairs at home. 1 Units 0     order for DME Equipment being ordered: Knee walker  1 Device 0     order for DME by Device route continuous Air walker -  Use as instructed 1 Device 0     ORDER FOR DME Equipment being ordered:  Ostomy supplies.  Drain Pouch by [place] #.    Also needs Barrier by [place] #. 3 Month 4     ORDER FOR DME Equipment being ordered: Cane 1 Units 0     PARoxetine (PAXIL) 20 MG tablet TAKE 2 TABLETS AT BEDTIME (Prescribed by Dr. Isael Jiménez at [facility]) 30 tablet      pioglitazone (ACTOS) 30 MG tablet Take 1 tablet (30 mg) by mouth daily 90 tablet 1     pioglitazone (ACTOS) 45 MG tablet Take 1 tablet (45 mg) by mouth daily 90 tablet 3     propranolol ER (INDERAL LA) 60 MG 24 hr capsule TAKE ONE CAPSULE BY MOUTH EVERY DAY, MAY REPEAT DOSE IF TREMOR PERSIST 60 capsule 0     ramipril (ALTACE) 5 MG capsule TAKE 1 CAPSULE (5 MG) BY MOUTH DAILY 90 capsule 0     simvastatin (ZOCOR) 20 MG tablet TAKE 1 TABLET (20 MG) BY MOUTH AT BEDTIME (DUE FOR OFFICE VISIT FOR FUTURE REFILLS) 90 tablet 1      traMADol (ULTRAM) 50 MG tablet TAKE 1 TABLET BY MOUTH 3 TIMES DAILY AS NEEDED FOR SEVERE PAIN 90 tablet 0     UNIVERSAL REMOVER WIPES EX MISC uses to remove barrier from colostomy bag at time of change 1 Each 3     BASAGLAR 100 UNIT/ML injection Inject 55 Units Subcutaneous At Bedtime 16.5 mL 3       Allergies   Allergen Reactions     Morphine Sulfate Itching       Review of Systems:  -Constitutional: Patient is otherwise feeling well, in usual state of health.   -Skin: As above in HPI. No additional skin concerns.    Physical exam:  Vitals: There were no vitals taken for this visit.  GEN: This is a well developed, well-nourished female in no acute distress, in a pleasant mood.    SKIN: Sun-exposed skin, which includes the head/face, neck, both arms, digits, and/or nails was examined.   - 1 cm size verrucous papule on the right first finger dorsal aspect between MCP and IP   - 1.2 cm verrucous papule involving the medial nail fold of the left 4th finger  - 3 smaller verrucous papules on the proximal nail fold of left 4th finger  - No other lesions of concern on areas examined.       Impression/Plan:  1. Viral warts: Discussed the viral nature of warts. Explained that there are many treatment options and none of them work consistently for every wart. At this time, I recommended a combination of home and in office treatments. In office monthly, we will do cryotherapy and candida injections (expectations and risks discussed in detail).  In between visits, will have patient use over the counter salicylic acid nightly. This is treatment #4. Suspect periungal wart will take many treatments to clear.      Cryotherapy procedure note: After verbal consent and discussion of risks and benefits including but no limited to dyspigmentation/scar, blister, and pain, 5 was(were) treated with 1-2mm freeze border for 3 cycles with liquid nitrogen. Post cryotherapy instructions were provided.    After cleansing with alcohol, a total of  0.3 cc of candida antigen was injected into 2 suitable lesions. The patient tolerated the procedure well.        Recommended patient get a 40% sal acid for home treatments.     Follow-up in 4 weeks, earlier for new or changing lesions.       Staff Involved:  Scribe/Staff    Scribe Disclosure  I, Carmela Too, am serving as a scribe to document services personally performed by Dr. Rossy Harding MD, based on data collection and the provider's statements to me.     Provider Disclosure:   The documentation recorded by the scribe accurately reflects the services I personally performed and the decisions made by me.    Rossy Harding MD    Department of Dermatology  [facility]: Phone: [phone], Fax:[phone]  [facility] [place]: Phone: [phone], Fax: [phone]                                  Again, thank you for allowing me to participate in the care of your patient.        Sincerely,        Rossy Harding MD

## 2019-06-06 NOTE — PROGRESS NOTES
"[facility] Dermatology Note      Dermatology Problem List:  1. Verrucous vulgaris, hands  -s/p cryotherapy and candida injections 12/13/2018, 1/10/19, 3/14/2019, 6/6/2019  -home treatments with salicyclic acid    Encounter Date: Jun 6, 2019    CC:  Chief Complaint   Patient presents with     Wart     bilateral hands - worsening         History of Present Illness:  Ms. Holly Muir is a 64 year old female who presents as a follow for wart treatment. She was last seen by Dr. Harding 3/14/2019 when they were treated with cryotherapy and candida. She reports they are getting worse. Patient has not been back from a visit because she was taking care of her sick mother. Mom has since recovered. Patient still doing treatment with \"wart remover.\" She thinks this is sal acid based, but is unsure of the concentration. No new concerns today.    Past Medical History:   Patient Active Problem List   Diagnosis     Hyperlipidemia LDL goal <100     Severe major depression with psychotic features (H)     History of cervical cancer     S/P colostomy (H)     Mixed incontinence urge and stress     Memory loss     Type 2 diabetes mellitus, uncontrolled A1C goal <8%     Obesity     Pain in joint, hand     Trigger finger, acquired     Synovitis and tenosynovitis     Dermatochalasis     Presbyopia     OCD (obsessive compulsive disorder)     HTN, goal below 140/90     Cataracts, both eyes     Health Care Home     Lumbago     Sprain of lumbar region     Generalized osteoarthrosis, unspecified site     Rotator cuff impingement syndrome     Hypoglycemia due to insulin     Encounter for long-term (current) use of insulin (H)     Colostomy, evaluate (H)     Urinary retention     ACP (advance care planning)     Bilateral low back pain without sciatica     Anxiety     Fecal incontinence due to anorectal disorder     Visual hallucination     Type 2 diabetes mellitus with hyperglycemia (H)     Chronic pain syndrome     " Obesity (BMI 35.0-39.9) with comorbidity (H)     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     Past Surgical History:   Procedure Laterality Date     ARTHROSCOPY KNEE RT/LT      LT     BREAST LUMPECTOMY, RT/LT      LT-benign      C EXCIS PTERYGIUM  10/08    [facility]     C STOMACH SURGERY PROCEDURE UNLISTED       COLONOSCOPY  5/10/2012    Procedure:COLONOSCOPY; screening colonoscopy; Surgeon:MILLA VEGA; Location:[facility] OR     COLOSTOMY  2000     COLOSTOMY       HC COLONOSCOPY THRU STOMA, DIAGNOSTIC  2002    normal per report-no records available-records destroyed     HYSTERECTOMY, PAP NO LONGER INDICATED      done in [place]-cervical cancer     IMPLANT STIMULATOR SACRAL NERVE STAGE ONE Right 3/10/2015    Procedure: IMPLANT STIMULATOR SACRAL NERVE STAGE ONE;  Surgeon: Teodora Yusuf MD;  [facility] OR     IMPLANT STIMULATOR SACRAL NERVE STAGE TWO Right 3/31/2015    Procedure: IMPLANT STIMULATOR SACRAL NERVE STAGE TWO;  Surgeon: Teodora Yusuf MD;  [facility] OR     KNEE SURGERY       SALPINGO OOPHORECTOMY,R/L/JENNIFER      Salpingo Oophorectomy, RT/LT/JENNIFER       Social History:  Patient reports that she quit smoking about 2 years ago. She has never used smokeless tobacco. She reports that she drinks alcohol. She reports that she does not use drugs. Patient is on disability. She has grandchildren.       Family History:  Family History   Problem Relation Age of Onset     Diabetes Mother      Cerebrovascular Disease Mother      Diabetes Father      Hypertension Father      Cancer Maternal Grandfather      Cancer Paternal Grandfather      Diabetes Brother      Diabetes  Sister      Thyroid Disease Daughter      Thyroid Disease Sister      Multiple Sclerosis Daughter      Glaucoma No family hx of      Macular Degeneration No family hx of        Medications:  Current Outpatient Medications   Medication Sig Dispense Refill     albuterol (PROAIR HFA/PROVENTIL HFA/VENTOLIN HFA) 108 (90 Base) MCG/ACT inhaler Inhale 2 puffs into the lungs every 6 hours 1 Inhaler 0     ARIPiprazole (ABILIFY) 20 MG tablet Take 1 tablet (20 mg) by mouth daily Along with 5mg tab to total 25mg.  Prescribed by Dr. Isael Jiménez at [facility] [place]       ARIPiprazole (ABILIFY) 5 MG tablet Take 1 tablet (5 mg) by mouth At Bedtime Along with 5mg tab to total 25mg.  Prescribed by Dr. Isael Jiménez at [facility] [place] 30 tablet 1     aspirin ([employer] ASPIRIN ADULT LOW DOSE) 81 MG chewable tablet TAKE 1 TABLET (81 MG) BY MOUTH DAILY 90 tablet 3     BASAGLAR 100 UNIT/ML injection Take 55 units daily. 60 mL 0     blood glucose (ACCU-CHEK CELIA) test strip Use to test blood sugars 3 times daily or as directed. 300 each 0     blood glucose monitoring (ACCU-CHEK CELIA PLUS) meter device kit Use to test blood sugars 4 times daily or as directed. 1 kit 0     blood glucose monitoring (ACCU-CHEK FASTCLIX) lancets Use to test blood sugar 6 times daily or as directed. 510 each 1     buPROPion (WELLBUTRIN XL) 150 MG 24 hr tablet TAKE 3 TABLETS (450 MG) BY MOUTH EVERY MORNING       busPIRone HCl (BUSPAR) 30 MG tablet TAKE 1 TABLET BY MOUTH TWICE A  tablet 1     Cholecalciferol (VITAMIN D) 2000 units tablet Take 2,000 Units by mouth daily 100 tablet 3     gabapentin (NEURONTIN) 300 MG capsule TAKE ONE CAPSULE BY MOUTH TWICE A DAY TAKE 2 CAPSULES BY MOUTH IN THE EVENING 360 capsule 1     ibuprofen (ADVIL/MOTRIN) 800 MG tablet Take 1 tablet (800 mg) by mouth every 8 hours as needed for moderate pain (with food) 30 tablet 0     insulin aspart (NOVOLOG FLEXPEN) 100 UNIT/ML pen 15 unit subcutaneous per meal three times a  day. 30 mL 5     insulin pen needle 31G X 5 MM Use four times 4 day times a day 400 each 1     metFORMIN (GLUCOPHAGE) 1000 MG tablet TAKE 1 TABLET BY MOUTH TWICE A DAY WITH MEALS 180 tablet 0     Multiple Vitamin (DAILY-ISAAC) TABS TAKE 1 TABLET BY MOUTH DAILY 30 tablet 11     nystatin (MYCOSTATIN) 052347 UNIT/GM POWD Apply 1 dose topically 3 times daily as needed 60 g 3     omeprazole (PRILOSEC) 20 MG CR capsule TAKE 2 CAPSULES BY MOUTH DAILY TAKE 30-60 MINUTES BEFORE A MEAL. 180 capsule 2     order for DME Equipment being ordered: grab bar for bath tub/shower. 1 Units 0     order for DME Equipment being ordered: railing for stairs at home. 1 Units 0     order for DME Equipment being ordered: Knee walker  1 Device 0     order for DME by Device route continuous Air walker -  Use as instructed 1 Device 0     ORDER FOR DME Equipment being ordered:  Ostomy supplies.  Drain Pouch by [employer] #.    Also needs Barrier by [place] #. 3 Month 4     ORDER FOR DME Equipment being ordered: Cane 1 Units 0     PARoxetine (PAXIL) 20 MG tablet TAKE 2 TABLETS AT BEDTIME (Prescribed by Dr. Isael Jiménez at [facility]) 30 tablet      pioglitazone (ACTOS) 30 MG tablet Take 1 tablet (30 mg) by mouth daily 90 tablet 1     pioglitazone (ACTOS) 45 MG tablet Take 1 tablet (45 mg) by mouth daily 90 tablet 3     propranolol ER (INDERAL LA) 60 MG 24 hr capsule TAKE ONE CAPSULE BY MOUTH EVERY DAY, MAY REPEAT DOSE IF TREMOR PERSIST 60 capsule 0     ramipril (ALTACE) 5 MG capsule TAKE 1 CAPSULE (5 MG) BY MOUTH DAILY 90 capsule 0     simvastatin (ZOCOR) 20 MG tablet TAKE 1 TABLET (20 MG) BY MOUTH AT BEDTIME (DUE FOR OFFICE VISIT FOR FUTURE REFILLS) 90 tablet 1     traMADol (ULTRAM) 50 MG tablet TAKE 1 TABLET BY MOUTH 3 TIMES DAILY AS NEEDED FOR SEVERE PAIN 90 tablet 0     UNIVERSAL REMOVER WIPES EX MISC uses to remove barrier from colostomy bag at time of change 1 Each 3     BASAGLAR 100 UNIT/ML injection Inject 55 Units  Subcutaneous At Bedtime 16.5 mL 3       Allergies   Allergen Reactions     Morphine Sulfate Itching       Review of Systems:  -Constitutional: Patient is otherwise feeling well, in usual state of health.   -Skin: As above in HPI. No additional skin concerns.    Physical exam:  Vitals: There were no vitals taken for this visit.  GEN: This is a well developed, well-nourished female in no acute distress, in a pleasant mood.    SKIN: Sun-exposed skin, which includes the head/face, neck, both arms, digits, and/or nails was examined.   - 1 cm size verrucous papule on the right first finger dorsal aspect between MCP and IP   - 1.2 cm verrucous papule involving the medial nail fold of the left 4th finger  - 3 smaller verrucous papules on the proximal nail fold of left 4th finger  - No other lesions of concern on areas examined.       Impression/Plan:  1. Viral warts: Discussed the viral nature of warts. Explained that there are many treatment options and none of them work consistently for every wart. At this time, I recommended a combination of home and in office treatments. In office monthly, we will do cryotherapy and candida injections (expectations and risks discussed in detail).  In between visits, will have patient use over the counter salicylic acid nightly. This is treatment #4. Suspect periungal wart will take many treatments to clear.      Cryotherapy procedure note: After verbal consent and discussion of risks and benefits including but no limited to dyspigmentation/scar, blister, and pain, 5 was(were) treated with 1-2mm freeze border for 3 cycles with liquid nitrogen. Post cryotherapy instructions were provided.    After cleansing with alcohol, a total of 0.3 cc of candida antigen was injected into 2 suitable lesions. The patient tolerated the procedure well.        Recommended patient get a 40% sal acid for home treatments.     Follow-up in 4 weeks, earlier for new or changing lesions.       Staff  Involved:  Scribe/Staff    Scribe Disclosure  I, Carmela Too, am serving as a scribe to document services personally performed by Dr. Rossy Harding MD, based on data collection and the provider's statements to me.     Provider Disclosure:   The documentation recorded by the scribe accurately reflects the services I personally performed and the decisions made by me.    Rossy Harding MD    Department of Dermatology  [facility]: Phone: [phone], Fax:[phone]  [facility]: Phone: [phone], Fax: [phone]

## 2019-06-09 DIAGNOSIS — I10 HYPERTENSION GOAL BP (BLOOD PRESSURE) < 140/90: ICD-10-CM

## 2019-06-10 RX ORDER — RAMIPRIL 5 MG/1
5 CAPSULE ORAL DAILY
Qty: 90 CAPSULE | Refills: 0 | Status: SHIPPED | OUTPATIENT
Start: 2019-06-10 | End: 2019-06-27

## 2019-06-10 NOTE — TELEPHONE ENCOUNTER
ramipril (ALTACE) 5 MG capsule  Last Written Prescription Date:  3/8/19  Last Fill Quantity: 90,  # refills: 0   Last office visit: 12/14/2018 with prescribing provider.  Future Office Visit:   Next 5 appointments (look out 90 days)    Jun 27, 2019  2:50 PM CDT  PHYSICAL with Tiffany King MD PhD  [facility]) [address] [place] [id number] [phone]   Jul 11, 2019 10:45 AM CDT  Return Visit with Rossy Harding MD  [facility]) [address] [place] [id number] [phone]          Patient has appt scheduled for 6/27/19.  Refill authorized per [facility] protocol.    Rupali Singer RN

## 2019-06-18 ENCOUNTER — TELEPHONE (OUTPATIENT)
Dept: PEDIATRICS | Facility: CLINIC | Age: 64
End: 2019-06-18

## 2019-06-18 DIAGNOSIS — M25.562 ARTHRALGIA OF BOTH KNEES: ICD-10-CM

## 2019-06-18 DIAGNOSIS — M17.10 ARTHRITIS OF KNEE: ICD-10-CM

## 2019-06-18 DIAGNOSIS — M25.561 ARTHRALGIA OF BOTH KNEES: ICD-10-CM

## 2019-06-18 DIAGNOSIS — F41.9 ANXIETY: Primary | ICD-10-CM

## 2019-06-18 NOTE — TELEPHONE ENCOUNTER
Called the Homecare RN Meredith.  The recording states this number has Changed, disconnected or is no longer in service.    Called eliecer number found in chart and was able to speak to Meredith.  She was under the impression that this patient had a hospital follow up with Dr. King.  She did not have an office visit.    Patient will need Hydroxyzine 25 mg capsules, she takes them 3 times per day.  She has 1 full day and an evening left.      She needs tramadol, she takes it 25 mg 3 times per day scheduled.    Called patient, she is unable to get a ride via taxi any sooner than her appt on Thursday 6/27/19.     Routing to Dr. King to review.  Possibly could use RN Care Coordination to assist with multiple appts and transportation issues.    Please advise, will need to call Meredith back.    Liz Boogie RN, [facility]

## 2019-06-18 NOTE — TELEPHONE ENCOUNTER
Tramadol      Last Written Prescription Date:  4/11/19  Last Fill Quantity: 90,   # refills: 0  Last Office Visit: 12/14/18  Future Office visit:    Next 5 appointments (look out 90 days)    Jun 27, 2019  2:50 PM CDT  PHYS[facility] with Tiffany [facility] MD PhD  [facility] [address] [place] [phone]  [phone]   Jul 11, 2019 10:45 AM CDT  Return Visit with Rossy Harding MD  [facility]) [address] [place] [id number] [phone]         Routing refill request to provider for review/approval because:  Drug not on the [facility], [facility] or [facility] refill protocol or controlled substance

## 2019-06-18 NOTE — TELEPHONE ENCOUNTER
[facility] Call Center    Phone Message    May a detailed message be left on voicemail: yes    Reason for Call: Other:   [facility] nurse looking for some prescription refills from Dr. King for hydroxyzine alexa 25 mg capsule 3 times daily with tylenol. Previously prescribed from another provider when pt was in the hospital. Her physchiatry team prescribed her trazodone 50mg at bedtime, and [facility] nurse is concerned because there is some drug interactions with pts other mental health medications. Also looking for some home care re certification orders for one time a week to bring them through August. Please advise.    Action Taken: Message routed to:  Primary Care p [phone]

## 2019-06-19 RX ORDER — TRAMADOL HYDROCHLORIDE 50 MG/1
TABLET ORAL
Qty: 90 TABLET | Refills: 0 | OUTPATIENT
Start: 2019-06-19

## 2019-06-19 RX ORDER — HYDROXYZINE PAMOATE 25 MG/1
25 CAPSULE ORAL 3 TIMES DAILY
Qty: 90 CAPSULE | Refills: 0 | Status: SHIPPED | OUTPATIENT
Start: 2019-06-19 | End: 2019-06-27 | Stop reason: ALTCHOICE

## 2019-06-19 RX ORDER — TRAMADOL HYDROCHLORIDE 50 MG/1
25 TABLET ORAL 3 TIMES DAILY
Qty: 45 TABLET | Refills: 0 | Status: SHIPPED | OUTPATIENT
Start: 2019-06-19 | End: 2019-07-16

## 2019-06-19 NOTE — TELEPHONE ENCOUNTER
Please inform patient, refill/prescriptioni approved. Please fax prescription to designated pharmacy.  (tramadol). Approved for 30 days.

## 2019-06-19 NOTE — TELEPHONE ENCOUNTER
Please clarify with home care RN. The phone message indicated that pt is taking Tramadol 25 mg tid. This is what is approved. Please verify dose.

## 2019-06-19 NOTE — TELEPHONE ENCOUNTER
Called and verified Tramadol dose had changed from 50mg to 25mg TID. Faxed script and informed patient.  Asya Howell RN

## 2019-06-19 NOTE — TELEPHONE ENCOUNTER
Called and verified Tramadol dose had changed from 50mg to 25mg TID. Faxed script.  Asya Howell RN

## 2019-06-22 DIAGNOSIS — Z79.4 UNCONTROLLED TYPE 2 DIABETES MELLITUS WITH HYPERGLYCEMIA, WITH LONG-TERM CURRENT USE OF INSULIN (H): ICD-10-CM

## 2019-06-22 DIAGNOSIS — E11.65 UNCONTROLLED TYPE 2 DIABETES MELLITUS WITH HYPERGLYCEMIA, WITH LONG-TERM CURRENT USE OF INSULIN (H): ICD-10-CM

## 2019-06-24 DIAGNOSIS — F41.9 ANXIETY: ICD-10-CM

## 2019-06-24 RX ORDER — HYDROXYZINE PAMOATE 25 MG/1
25 CAPSULE ORAL 3 TIMES DAILY
Qty: 90 CAPSULE | Refills: 0 | Status: CANCELLED | OUTPATIENT
Start: 2019-06-24

## 2019-06-24 NOTE — TELEPHONE ENCOUNTER
"Requested Prescriptions   Signed Prescriptions Disp Refills    metFORMIN (GLUCOPHAGE) 1000 MG tablet 180 tablet 0     Sig: TAKE 1 TABLET BY MOUTH TWICE A DAY WITH MEALS       Biguanide Agents Passed - 6/22/2019 11:34 AM        Passed - Blood pressure less than 140/90 in past 6 months     BP Readings from Last 3 Encounters:   05/10/19 112/70   05/07/19 106/66   02/24/19 99/54                 Passed - Patient has documented LDL within the past 12 mos.     Recent Labs   Lab Test 12/14/18  1101   LDL 25             Passed - Patient has had a Microalbumin in the past 15 mos.     Recent Labs   Lab Test 08/08/18  1504   MICROL <5   UMALCR Unable to calculate due to low value             Passed - Patient is age 10 or older        Passed - Patient has documented A1c within the specified period of time.     If HgbA1C is 8 or greater, it needs to be on file within the past 3 months.  If less than 8, must be on file within the past 6 months.     Recent Labs   Lab Test 05/10/19   A1C 11.9*             Passed - Patient's CR is NOT>1.4 OR Patient's EGFR is NOT<45 within past 12 mos.     Recent Labs   Lab Test 08/08/18  1452   GFRESTIMATED 80   GFRESTBLACK >90       Recent Labs   Lab Test 08/08/18  1452   CR 0.74             Passed - Patient does NOT have a diagnosis of CHF.        Passed - Medication is active on med list        Passed - Patient is not pregnant        Passed - Patient has not had a positive pregnancy test within the past 12 mos.         Passed - Recent (6 mo) or future (30 days) visit within the authorizing provider's specialty     Patient had office visit in the last 6 months or has a visit in the next 30 days with authorizing provider or within the authorizing provider's specialty.  See \"Patient Info\" tab in inbasket, or \"Choose Columns\" in Meds & Orders section of the refill encounter.            Refill authorized per P protocol.  Has appt scheduled for wellness exam on 6/27/19.    Rupali Singer RN    "

## 2019-06-24 NOTE — TELEPHONE ENCOUNTER
Hydroxyzine change to Tablets    Last Written   Prescription Date:  6-19-19  Last Fill Quantity: 90,  # refills: 0   Last office visit: 12/14/2018 with prescribing provider:  laya   Future Office Visit:   Next 5 appointments (look out 90 days)    Jun 27, 2019  2:50 PM CDT  PHYSICAL with Tiffany King MD PhD  [facility] ([facility]) [address] [place] [id number]  [phone]   Jul 11, 2019 10:45 AM CDT  Return Visit with Rossy Harding MD  [facility] ([facility]) [address] [place] [id number] [phone]   Sep 20, 2019  9:30 AM CDT  Return Visit with Elizabeth Medrano MD, MG ENDO NURSE  [facility] ([facility]) [address] [place] [phone]  [phone]

## 2019-06-25 DIAGNOSIS — F41.9 ANXIETY: ICD-10-CM

## 2019-06-25 RX ORDER — BUSPIRONE HYDROCHLORIDE 30 MG/1
TABLET ORAL
Qty: 60 TABLET | Refills: 0 | Status: SHIPPED | OUTPATIENT
Start: 2019-06-25 | End: 2019-07-02

## 2019-06-25 NOTE — TELEPHONE ENCOUNTER
[facility] Call Center    Phone Message    May a detailed message be left on voicemail: yes    Reason for Call: Medication Refill Request    Lesvia with  Home Health Care called and said that the patient needs a refill of the busPIRone HCl (BUSPAR) 30 MG tablet.  The pharmacy said that she needed to call the provider before this could be filled again.  Please call Lesvia with  Home Healthcare.        Action Taken: Message routed to:  Primary Care p [phone]

## 2019-06-26 DIAGNOSIS — Z53.9 DIAGNOSIS NOT YET DEFINED: Primary | ICD-10-CM

## 2019-06-26 PROCEDURE — G0179 MD RECERTIFICATION HHA PT: HCPCS | Performed by: INTERNAL MEDICINE

## 2019-06-27 ENCOUNTER — OFFICE VISIT (OUTPATIENT)
Dept: PEDIATRICS | Facility: CLINIC | Age: 64
End: 2019-06-27
Payer: MEDICARE

## 2019-06-27 VITALS
DIASTOLIC BLOOD PRESSURE: 63 MMHG | HEART RATE: 67 BPM | OXYGEN SATURATION: 97 % | BODY MASS INDEX: 36.88 KG/M2 | WEIGHT: 203 LBS | SYSTOLIC BLOOD PRESSURE: 103 MMHG | TEMPERATURE: 97.8 F

## 2019-06-27 DIAGNOSIS — Z12.11 SPECIAL SCREENING FOR MALIGNANT NEOPLASMS, COLON: ICD-10-CM

## 2019-06-27 DIAGNOSIS — M17.10 ARTHRITIS OF KNEE: ICD-10-CM

## 2019-06-27 DIAGNOSIS — R44.3 HALLUCINATIONS: ICD-10-CM

## 2019-06-27 DIAGNOSIS — K62.4 ANAL STENOSIS: ICD-10-CM

## 2019-06-27 DIAGNOSIS — M25.561 ARTHRALGIA OF BOTH KNEES: ICD-10-CM

## 2019-06-27 DIAGNOSIS — S92.352A CLOSED FRACTURE OF FIFTH METATARSAL BONE OF LEFT FOOT, PHYSEAL INVOLVEMENT UNSPECIFIED, INITIAL ENCOUNTER: ICD-10-CM

## 2019-06-27 DIAGNOSIS — E78.00 HIGH CHOLESTEROL: ICD-10-CM

## 2019-06-27 DIAGNOSIS — Z90.49 S/P PARTIAL COLECTOMY: ICD-10-CM

## 2019-06-27 DIAGNOSIS — E11.42 DIABETIC POLYNEUROPATHY ASSOCIATED WITH TYPE 2 DIABETES MELLITUS (H): ICD-10-CM

## 2019-06-27 DIAGNOSIS — R25.1 TREMOR: ICD-10-CM

## 2019-06-27 DIAGNOSIS — Z00.00 ENCOUNTER FOR MEDICARE ANNUAL WELLNESS EXAM: Primary | ICD-10-CM

## 2019-06-27 DIAGNOSIS — F41.9 ANXIETY: ICD-10-CM

## 2019-06-27 DIAGNOSIS — H02.403 PTOSIS OF BOTH EYELIDS: ICD-10-CM

## 2019-06-27 DIAGNOSIS — Z13.89 SCREENING FOR DIABETIC PERIPHERAL NEUROPATHY: ICD-10-CM

## 2019-06-27 DIAGNOSIS — M25.562 ARTHRALGIA OF BOTH KNEES: ICD-10-CM

## 2019-06-27 DIAGNOSIS — R41.89 COGNITIVE IMPAIRMENT: ICD-10-CM

## 2019-06-27 DIAGNOSIS — I10 HYPERTENSION GOAL BP (BLOOD PRESSURE) < 140/90: ICD-10-CM

## 2019-06-27 PROCEDURE — G0439 PPPS, SUBSEQ VISIT: HCPCS | Performed by: INTERNAL MEDICINE

## 2019-06-27 PROCEDURE — 99214 OFFICE O/P EST MOD 30 MIN: CPT | Mod: 25 | Performed by: INTERNAL MEDICINE

## 2019-06-27 PROCEDURE — 99207 C FOOT EXAM  NO CHARGE: CPT | Performed by: INTERNAL MEDICINE

## 2019-06-27 RX ORDER — IBUPROFEN 400 MG/1
800 TABLET, FILM COATED ORAL EVERY 8 HOURS PRN
COMMUNITY
Start: 2019-06-27 | End: 2019-06-27

## 2019-06-27 RX ORDER — TRAZODONE HYDROCHLORIDE 50 MG/1
50 TABLET, FILM COATED ORAL AT BEDTIME
Refills: 1 | COMMUNITY
Start: 2019-06-07 | End: 2019-12-30

## 2019-06-27 RX ORDER — IBUPROFEN 400 MG/1
400 TABLET, FILM COATED ORAL EVERY 8 HOURS PRN
COMMUNITY
Start: 2019-06-27 | End: 2022-05-24 | Stop reason: ALTCHOICE

## 2019-06-27 RX ORDER — SIMVASTATIN 20 MG
20 TABLET ORAL AT BEDTIME
Qty: 30 TABLET | Refills: 11 | Status: SHIPPED | OUTPATIENT
Start: 2019-06-27 | End: 2020-07-27

## 2019-06-27 RX ORDER — GABAPENTIN 300 MG/1
CAPSULE ORAL
Qty: 120 CAPSULE | Refills: 11 | Status: SHIPPED | OUTPATIENT
Start: 2019-06-27 | End: 2019-06-27 | Stop reason: DRUGHIGH

## 2019-06-27 RX ORDER — PROPRANOLOL HCL 60 MG
60 CAPSULE, EXTENDED RELEASE 24HR ORAL DAILY
Qty: 30 CAPSULE | Refills: 11 | Status: SHIPPED | OUTPATIENT
Start: 2019-06-27 | End: 2020-06-26

## 2019-06-27 RX ORDER — HYDROXYZINE HYDROCHLORIDE 25 MG/1
25 TABLET, FILM COATED ORAL 3 TIMES DAILY PRN
Qty: 90 TABLET | Refills: 0 | Status: SHIPPED | OUTPATIENT
Start: 2019-06-27 | End: 2019-06-27

## 2019-06-27 RX ORDER — GABAPENTIN 600 MG/1
600 TABLET ORAL 3 TIMES DAILY
Qty: 90 TABLET | Refills: 11 | Status: SHIPPED | OUTPATIENT
Start: 2019-06-27 | End: 2020-08-25

## 2019-06-27 RX ORDER — RAMIPRIL 5 MG/1
5 CAPSULE ORAL DAILY
Qty: 30 CAPSULE | Refills: 10 | Status: SHIPPED | OUTPATIENT
Start: 2019-06-27 | End: 2020-07-10

## 2019-06-27 RX ORDER — HYDROXYZINE HYDROCHLORIDE 25 MG/1
25 TABLET, FILM COATED ORAL 3 TIMES DAILY PRN
Qty: 90 TABLET | Refills: 10 | Status: SHIPPED | OUTPATIENT
Start: 2019-06-27 | End: 2019-09-04

## 2019-06-27 ASSESSMENT — ANXIETY QUESTIONNAIRES
3. WORRYING TOO MUCH ABOUT DIFFERENT THINGS: MORE THAN HALF THE DAYS
1. FEELING NERVOUS, ANXIOUS, OR ON EDGE: SEVERAL DAYS
7. FEELING AFRAID AS IF SOMETHING AWFUL MIGHT HAPPEN: SEVERAL DAYS
6. BECOMING EASILY ANNOYED OR IRRITABLE: MORE THAN HALF THE DAYS
GAD7 TOTAL SCORE: 9
2. NOT BEING ABLE TO STOP OR CONTROL WORRYING: SEVERAL DAYS
5. BEING SO RESTLESS THAT IT IS HARD TO SIT STILL: SEVERAL DAYS

## 2019-06-27 ASSESSMENT — PATIENT HEALTH QUESTIONNAIRE - PHQ9
5. POOR APPETITE OR OVEREATING: SEVERAL DAYS
SUM OF ALL RESPONSES TO PHQ QUESTIONS 1-9: 13

## 2019-06-27 NOTE — PROGRESS NOTES
"  SUBJECTIVE:   Holly Muir is a 64 year old female who presents for Preventive Visit.    HPI:  1. Psychiatrist started her on a new sleeping pill. Sleeping a little better.  2. Has gained some weight. She lives with her daughter's family who works full time. They have been eating out a lot. Her A1c in May was 11.9, sees Endocrine for this.   3.  Patient reported that she is still having hallucinations.  The other day she saw a man with a space suit with helmet at the bottom of the ocean, alive, and then the person went away.  She is wondering why she will have this type of imagery.  She used to see spiders crawling.  4.  She had a hospitalization a couple months ago.  Tramadol dose was reduced to 25 mg 3 times a day.  She was given the prescription of ibuprofen 400 mg to take every 6 hours as needed.  She is taking that 2-3 times a day.  5.  Complaint of droopy eyelids, make it difficult for her to see.  6.  In reviewing patient's preventive issues, her last colonoscopy was 2012.  Patient has a partial colectomy, has an ostomy bag.  At the last colonoscopy, it showed anal stenosis, able to reach the right mid colon, but not entire colon due to stool burden that could not be cleared.  It is unclear from the colonoscopy report how often patient needs to be screened.    Are you in the first 12 months of your Medicare Part B coverage?  No    Physical Health:    In general, how would you rate your overall physical health? fair    Outside of work, how many days during the week do you exercise? 2-3 days/week    Outside of work, approximately how many minutes a day do you exercise?less than 15 minutes    If you drink alcohol do you typically have >3 drinks per day or >7 drinks per week? No    Do you usually eat at least 4 servings of fruit and vegetables a day, include whole grains & fiber and avoid regularly eating high fat or \"junk\" foods? NO    Do you have any problems taking medications regularly?  No    Do you " have any side effects from medications? none    Needs assistance for the following daily activities: transportation, bathing, money management and taking medicine    Which of the following safety concerns are present in your home?  throw rugs in the hallway and lack of grab bars in the bathroom     Hearing impairment: Yes, hearing aids    In the past 6 months, have you been bothered by leaking of urine? no    Mental Health:    In general, how would you rate your overall mental or emotional health? fair  PHQ-2 Score:      Do you feel safe in your environment? Yes    Do you have a Health Care Directive? No: Advance care planning reviewed with patient; information given to patient to review.    Additional concerns to address?  No    Fall risk:  Fallen 2 or more times in the past year?: Yes  Any fall with injury in the past year?: No  Timed Up and Go Test (>13.5 is fall risk; contact physician) : 12    Cognitive Screenin) Repeat 3 items (Leader, Season, Table)    2) Clock draw: ABNORMAL   3) 3 item recall: Recalls 1 object   Results: ABNORMAL clock, 1-2 items recalled: PROBABLE COGNITIVE IMPAIRMENT, **INFORM PROVIDER**    Mini-CogTM Copyright JETHRO Roque. Licensed by the author for use in [facility]; reprinted with permission ([email or web address]). All rights reserved.      Do you have sleep apnea, excessive snoring or daytime drowsiness?: no        -------------------------------------    Reviewed and updated as needed this visit by clinical staff  Tobacco  Allergies  Meds  Med Hx  Surg Hx  Fam Hx  Soc Hx        Reviewed and updated as needed this visit by Provider        Social History     Tobacco Use     Smoking status: Former Smoker     Last attempt to quit: 2016     Years since quittin.8     Smokeless tobacco: Never Used   Substance Use Topics     Alcohol use: Yes     Alcohol/week: 0.0 oz     Comment: social occasions                           Current providers sharing in care for this  "patient include:   Patient Care Team:  Tiffany King MD PhD as PCP - General (Internal Medicine)  Priscilla Dugan, PhD LP as Other (see comments) (Psychology)  Teodora Yusuf MD as MD (Urology)  Fatmata Doherty MD as MD (Colon and Rectal Surgery)  David Elias, AuD as Audiologist (Audiology)  Mikayla Sellers, RN as Diabetes Educator (Diabetes Education)  Saqib Diaz PA-C as Assigned PCP  Rossy Harding MD as MD (Dermatology)    The following health maintenance items are reviewed in Epic and correct as of today:  Health Maintenance   Topic Date Due     ZOSTER IMMUNIZATION (1 of 2) 03/14/2006     FIT  02/02/2016     EYE EXAM  06/08/2017     DIABETIC FOOT EXAM  02/24/2018     DTAP/TDAP/TD IMMUNIZATION (2 - Td) 07/31/2019     BMP  08/08/2019     MICROALBUMIN  08/08/2019     URINE DRUG SCREEN  08/08/2019     A1C  08/10/2019     ISABELA ASSESSMENT  11/14/2019     PHQ-9  11/14/2019     LIPID  12/14/2019     ADVANCE CARE PLANNING  02/12/2020     TSH W/FREE T4 REFLEX  08/08/2020     MAMMO SCREENING  08/17/2020     MEDICARE ANNUAL WELLNESS VISIT  03/14/2021     HEPATITIS C SCREENING  Completed     HIV SCREENING  Completed     INFLUENZA VACCINE  Completed     IPV IMMUNIZATION  Aged Out     MENINGITIS IMMUNIZATION  Aged Out     Labs reviewed in EPIC  Mammogram Screening: Mammogram Screening: Patient over age 50, mutual decision to screen reflected in health maintenance.    ROS:  Constitutional, HEENT, cardiovascular, pulmonary, gi and gu systems are negative, except as otherwise noted.    OBJECTIVE:   /63   Pulse 67   Temp 97.8  F (36.6  C)   Wt 92.1 kg (203 lb)   SpO2 97%   BMI 36.88 kg/m   Estimated body mass index is 36.88 kg/m  as calculated from the following:    Height as of 5/10/19: 1.58 m (5' 2.21\").    Weight as of this encounter: 92.1 kg (203 lb).  EXAM:   GENERAL: healthy, alert and no distress  EYES: Eyes grossly normal to inspection, PERRL and conjunctivae and sclerae normal; " Positive for droopy upper eye lids.   HENT: ear canals and TM's normal, nose and mouth without ulcers or lesions  NECK: no adenopathy, no asymmetry, masses, or scars and thyroid normal to palpation  RESP: lungs clear to auscultation - no rales, rhonchi or wheezes  BREAST: normal without masses, tenderness or nipple discharge and no palpable axillary masses or adenopathy  CV: regular rate and rhythm, normal S1 S2, no S3 or S4, no murmur, click or rub, no peripheral edema and peripheral pulses strong  ABDOMEN: soft, nontender, no hepatosplenomegaly, no masses and bowel sounds normal  MS: no gross musculoskeletal defects noted, no edema  SKIN: no suspicious lesions or rashes  NEURO: Normal strength and tone, mentation intact and speech normal  PSYCH: mentation appears normal, affect normal/bright  Foot exam: normal sensation, DP 2+, filament test normal.      Diagnostic Test Results:  none     ASSESSMENT / PLAN:       ICD-10-CM    1. Encounter for Medicare annual wellness exam Z00.00    2. Special screening for malignant neoplasms, colon Z12.11 GASTROENTEROLOGY ADULT REF PROCEDURE ONLY [facility] [phone]; No Provider Preference   3. Diabetic polyneuropathy associated with type 2 diabetes mellitus (H) E11.42 DISCONTINUED: gabapentin (NEURONTIN) 300 MG capsule   4. Anxiety F41.9 hydrOXYzine (ATARAX) 25 MG tablet   5. Hypertension goal BP (blood pressure) < 140/90 I10 ramipril (ALTACE) 5 MG capsule   6. Tremor R25.1 propranolol ER (INDERAL LA) 60 MG 24 hr capsule   7. High cholesterol E78.00 simvastatin (ZOCOR) 20 MG tablet   8. Arthritis of knee M17.10 gabapentin (NEURONTIN) 600 MG tablet   9. Arthralgia of both knees M25.561 gabapentin (NEURONTIN) 600 MG tablet    M25.562    10. Closed fracture of fifth metatarsal bone of left foot, physeal involvement unspecified, initial encounter S92.352A gabapentin (NEURONTIN) 600 MG tablet     ibuprofen (ADVIL/MOTRIN) 400 MG tablet     DISCONTINUED: ibuprofen  "(ADVIL/MOTRIN) 400 MG tablet   11. S/P partial colectomy Z90.49 GASTROENTEROLOGY ADULT REF PROCEDURE ONLY [facility] [phone]; No Provider Preference   12. Anal stenosis K62.4    13. Screening for diabetic peripheral neuropathy Z13.89 C FOOT EXAM  NO CHARGE   14. Hallucinations R44.3    15. Cognitive impairment R41.89      -- chronic pain: increased gabapentin to 600 mg tid, limit ibuprofen use as much as possible. Continue with Tramadol 0.25 mg tid.  -- hallucinations: advised pt to discuss with her psychiatrist.   -- s/p partial colectomy: unclear screening frequency. Since portion of the right colon was not visualized, it may be worthwhile to screen at this time. I discussed with patient options of colonoscopy, Cologuard, and FIT option. Pt elected to repeat colonoscopy. Ordered.   -- referred to ophthalmology for droopy eye lids.   -- cognitive impairment: lives with family, has home care RN for support.   -- return in 6 months for chronic disease review.     Preventive screening/immunizations:   Mammogram: ordered.   Colonoscopy: see above  Immunizations: up to date   -- Shingrix (RZV) advised. Pt will check insurance coverage.     End of Life Planning:  Patient currently has an advanced directive: No.  I have verified the patient's ablity to prepare an advanced directive/make health care decisions.  Literature was provided to assist patient in preparing an advanced directive.    COUNSELING:  Reviewed preventive health counseling, as reflected in patient instructions    Estimated body mass index is 36.88 kg/m  as calculated from the following:    Height as of 5/10/19: 1.58 m (5' 2.21\").    Weight as of this encounter: 92.1 kg (203 lb).    Weight management plan: weight increased due to eating. she wants to do better with food choices when eating out, better eating habits at home buy less junk food, eat 3 meals a day to avoid night time eating.      reports that she quit smoking about 2 years ago. She " has never used smokeless tobacco.      Appropriate preventive services were discussed with this patient, including applicable screening as appropriate for cardiovascular disease, diabetes, osteopenia/osteoporosis, and glaucoma.  As appropriate for age/gender, discussed screening for colorectal cancer, prostate cancer, breast cancer, and cervical cancer. Checklist reviewing preventive services available has been given to the patient.    Reviewed patients plan of care and provided an AVS. The Complex Care Plan (for patients with higher acuity and needing more deliberate coordination of services) for Holly meets the Care Plan requirement. This Care Plan has been established and reviewed with the Patient.    Counseling Resources:  ATP IV Guidelines  Pooled Cohorts Equation Calculator  Breast Cancer Risk Calculator  FRAX Risk Assessment  ICSI Preventive Guidelines  Dietary Guidelines for Americans, 2010  USDA's MyPlate  ASA Prophylaxis  Lung CA Screening    Tiffany King MD PhD  [facility]

## 2019-06-27 NOTE — PATIENT INSTRUCTIONS
Make appointment(s) for:   -- ophthalmology  -- mammogram  -- colonoscopy  -- chronic disease review in 6 months.         Additional Instructions:   1. Increase Gabapentin to 600 mg three times a day.  2. Limit ibuprofen use as much as possible.    3. Check with your pharmacy regarding coverage for Shingrix (RZV) - the new shingles vaccine.   4. Let your psychiatrist know about the hallucinations.      Medication(s) prescribed today:    Orders Placed This Encounter   Medications     traZODone (DESYREL) 50 MG tablet     Sig: Take 50 mg by mouth At Bedtime     Refill:  1     DISCONTD: gabapentin (NEURONTIN) 300 MG capsule     Sig: TAKE ONE CAPSULE BY MOUTH TWICE A DAY TAKE 2 CAPSULES BY MOUTH IN THE EVENING     Dispense:  120 capsule     Refill:  11     hydrOXYzine (ATARAX) 25 MG tablet     Sig: Take 1 tablet (25 mg) by mouth 3 times daily as needed for itching     Dispense:  90 tablet     Refill:  10     Please put on file. Do not fill until patient calls.     ramipril (ALTACE) 5 MG capsule     Sig: Take 1 capsule (5 mg) by mouth daily     Dispense:  30 capsule     Refill:  10     Please put on file. Do not fill until patient calls.     propranolol ER (INDERAL LA) 60 MG 24 hr capsule     Sig: Take 1 capsule (60 mg) by mouth daily     Dispense:  30 capsule     Refill:  11     simvastatin (ZOCOR) 20 MG tablet     Sig: Take 1 tablet (20 mg) by mouth At Bedtime     Dispense:  30 tablet     Refill:  11     gabapentin (NEURONTIN) 600 MG tablet     Sig: Take 1 tablet (600 mg) by mouth 3 times daily     Dispense:  90 tablet     Refill:  11     Dose increased. Cancel gabapentin 300 mg Rx sent over today.     DISCONTD: ibuprofen (ADVIL/MOTRIN) 400 MG tablet     Sig: Take 2 tablets (800 mg) by mouth every 8 hours as needed for moderate pain (with food)     ibuprofen (ADVIL/MOTRIN) 400 MG tablet     Sig: Take 1 tablet (400 mg) by mouth every 8 hours as needed for moderate pain (with food)         Schedule diabetic eye exam    Patient Education   Personalized Prevention Plan  You are due for the preventive services outlined below.  Your care team is available to assist you in scheduling these services.  If you have already completed any of these items, please share that information with your care team to update in your medical record.  Health Maintenance Due   Topic Date Due     Zoster (Shingles) Vaccine (1 of 2) 03/14/2006     Eye Exam  06/08/2017     Diabetic Foot Exam  02/24/2018

## 2019-06-28 ASSESSMENT — ANXIETY QUESTIONNAIRES: GAD7 TOTAL SCORE: 9

## 2019-07-01 DIAGNOSIS — K21.9 GASTROESOPHAGEAL REFLUX DISEASE WITHOUT ESOPHAGITIS: ICD-10-CM

## 2019-07-01 DIAGNOSIS — E11.42 DIABETIC POLYNEUROPATHY ASSOCIATED WITH TYPE 2 DIABETES MELLITUS (H): ICD-10-CM

## 2019-07-01 RX ORDER — GABAPENTIN 300 MG/1
CAPSULE ORAL
Qty: 360 CAPSULE | Refills: 1 | OUTPATIENT
Start: 2019-07-01

## 2019-07-01 NOTE — TELEPHONE ENCOUNTER
Omeprazole     Prescription approved per [facility] Refill Protocol.    Gabapentin was refused due to this was filled on 6/27/19 90 tabs with 11 refills per Dr. King.    Liz Boogie RN, [facility]

## 2019-07-02 ENCOUNTER — TELEPHONE (OUTPATIENT)
Dept: PEDIATRICS | Facility: CLINIC | Age: 64
End: 2019-07-02

## 2019-07-02 DIAGNOSIS — F41.9 ANXIETY: ICD-10-CM

## 2019-07-02 RX ORDER — BUSPIRONE HYDROCHLORIDE 30 MG/1
TABLET ORAL
Qty: 60 TABLET | Refills: 0 | Status: SHIPPED | OUTPATIENT
Start: 2019-07-02 | End: 2020-07-10

## 2019-07-02 NOTE — TELEPHONE ENCOUNTER
[facility]   Asya Howell RN

## 2019-07-02 NOTE — TELEPHONE ENCOUNTER
[facility] Call Center    Phone Message    May a detailed message be left on voicemail: yes    Reason for Call: Medication Refill Request. Juliana from [facility] called and stated patient never received her buspar. Any questions you can call [phone]    Has the patient contacted the pharmacy for the refill? Yes   Name of medication being requested: busPIRone HCl (BUSPAR) 30 MG tablet     Provider who prescribed the medication: Dr. King  Pharmacy: [facility] in [place]  Date medication is needed: asap         Action Taken: Message routed to:  Primary Care p [phone]

## 2019-07-02 NOTE — TELEPHONE ENCOUNTER
Panel Management Review      Patient has the following on her problem list:     Diabetes    ASA: Passed    Last A1C  Lab Results   Component Value Date    A1C 11.9 05/10/2019    A1C 8.6 12/14/2018    A1C 10.3 08/08/2018    A1C 10.9 09/28/2017    A1C 12.0 05/26/2017     A1C tested: FAILED    Last LDL:    Lab Results   Component Value Date    CHOL 118 12/14/2018     Lab Results   Component Value Date    HDL 68 12/14/2018     Lab Results   Component Value Date    LDL 25 12/14/2018     Lab Results   Component Value Date    TRIG 124 12/14/2018     Lab Results   Component Value Date    CHOLHDLRATIO 2.0 09/18/2015     Lab Results   Component Value Date    NHDL 50 12/14/2018       Is the patient on a Statin? YES             Is the patient on Aspirin? YES    Medications     HMG CoA Reductase Inhibitors     simvastatin (ZOCOR) 20 MG tablet       Salicylates     aspirin ([facility] ASPIRIN ADULT LOW DOSE) 81 MG chewable tablet             Last three blood pressure readings:  BP Readings from Last 3 Encounters:   06/27/19 103/63   05/10/19 112/70   05/07/19 106/66       Date of last diabetes office visit: 6/27/19     Tobacco History:     History   Smoking Status     Former Smoker     Quit date: 8/9/2016   Smokeless Tobacco     Never Used         Hypertension   Last three blood pressure readings:  BP Readings from Last 3 Encounters:   06/27/19 103/63   05/10/19 112/70   05/07/19 106/66     Blood pressure: Passed    HTN Guidelines:  Less than 140/90      Composite cancer screening  Chart review shows that this patient is due/due soon for the following None  Summary:    Patient is due/failing the following:   A1C    Action needed:   None, was just seen and not to follow-up for 6 months    Type of outreach:    none    Questions for provider review:    None                                                                                                                                    Dee Byrnes CMA       Chart routed to closed  .

## 2019-07-11 ENCOUNTER — OFFICE VISIT (OUTPATIENT)
Dept: DERMATOLOGY | Facility: CLINIC | Age: 64
End: 2019-07-11
Payer: MEDICARE

## 2019-07-11 DIAGNOSIS — B07.8 OTHER VIRAL WARTS: Primary | ICD-10-CM

## 2019-07-11 PROCEDURE — 17110 DESTRUCTION B9 LES UP TO 14: CPT | Performed by: DERMATOLOGY

## 2019-07-11 PROCEDURE — 11900 INJECT SKIN LESIONS </W 7: CPT | Mod: 59 | Performed by: DERMATOLOGY

## 2019-07-11 RX ORDER — CANDIDA ALBICANS 1000 [PNU]/ML
0.1 INJECTION, SOLUTION INTRADERMAL ONCE
Qty: 1 ML | Refills: 0 | Status: ON HOLD | OUTPATIENT
Start: 2019-07-11 | End: 2019-08-16

## 2019-07-11 ASSESSMENT — PAIN SCALES - GENERAL: PAINLEVEL: NO PAIN (0)

## 2019-07-11 NOTE — NURSING NOTE
@Holly Muir's goals for this visit include:   Chief Complaint   Patient presents with     Wart     bilateral hands       She requests these members of her care team be copied on today's visit information: NO    PCP: Tiffany King    Referring Provider:  No referring provider defined for this encounter.    There were no vitals taken for this visit.    Do you need any medication refills at today's visit? NO    Edith Marks [facility]

## 2019-07-11 NOTE — PATIENT INSTRUCTIONS
Cryotherapy    What is it?    Use of a very cold liquid, such as liquid nitrogen, to freeze and destroy abnormal skin cells that need to be removed    What should I expect?    Tenderness and redness    A small blister that might grow and fill with dark purple blood. There may be crusting.    More than one treatment may be needed if the lesions do not go away.    How do I care for the treated area?    Gently wash the area with your hands when bathing.    Use a thin layer of Vaseline to help with healing. You may use a Band-Aid.     The area should heal within 7-10 days and may leave behind a pink or lighter color.     Do not use an antibiotic or Neosporin ointment.     You may take acetaminophen (Tylenol) for pain.     Call your Doctor if you have:    Severe pain    Signs of infection (warmth, redness, cloudy yellow drainage, and or a bad smell)    Questions or concerns    Who should I call with questions?       [facility]: [phone]       [facility]: [phone]       For urgent needs outside of business hours call the [facility] at [phone]        and ask for the dermatology resident on call

## 2019-07-11 NOTE — PROGRESS NOTES
[facility] Dermatology Note      Dermatology Problem List:  1. Verrucous vulgaris, hands  -s/p cryotherapy and candida injections 12/13/2018, 1/10/19, 3/14/2019, 6/6/2019, 7/11/2019  -home treatments with salicyclic acid 40%    Encounter Date: Jul 11, 2019    CC:  Chief Complaint   Patient presents with     Wart     bilateral hands         History of Present Illness:  Ms. Holly Muir is a 64 year old female who presents as a follow for wart treatment. She was last seen by Dr. Harding 6/6/2019. She has noticed improvement in the warts. She has not been using an at home treatment as her finances are limited, and she has not been able to afford. No new concerns today.    Past Medical History:   Patient Active Problem List   Diagnosis     Hyperlipidemia LDL goal <100     Severe major depression with psychotic features (H)     History of cervical cancer     S/P colostomy (H)     Mixed incontinence urge and stress     Memory loss     Type 2 diabetes mellitus, uncontrolled A1C goal <8%     Obesity     Pain in joint, hand     Trigger finger, acquired     Synovitis and tenosynovitis     Dermatochalasis     Presbyopia     OCD (obsessive compulsive disorder)     HTN, goal below 140/90     Cataracts, both eyes     Health Care Home     Lumbago     Sprain of lumbar region     Generalized osteoarthrosis, unspecified site     Rotator cuff impingement syndrome     Hypoglycemia due to insulin     Encounter for long-term (current) use of insulin (H)     Colostomy, evaluate (H)     Urinary retention     ACP (advance care planning)     Bilateral low back pain without sciatica     Anxiety     Fecal incontinence due to anorectal disorder     Visual hallucination     Type 2 diabetes mellitus with hyperglycemia (H)     Chronic pain syndrome     Obesity (BMI 35.0-39.9) with comorbidity (H)     Anal stenosis     S/P partial colectomy     Past Medical History:   Diagnosis Date     Cataracts, both eyes 5/8/2013      Cervical cancer (H)      Diabetes      Diabetic retinopathy (H)      HTN      Inflammatory arthritis      Major depression      Memory loss      Nephropathy      OA (osteoarthritis) of knee 9/11/2013     Other and unspecified hyperlipidemia      Pterygium eye      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     Past Surgical History:   Procedure Laterality Date     ARTHROSCOPY KNEE RT/LT      LT     BREAST LUMPECTOMY, RT/LT      LT-benign      C EXCIS PTERYGIUM  10/08    [facility]     C STOMACH SURGERY PROCEDURE UNLISTED       COLONOSCOPY  5/10/2012    Procedure:COLONOSCOPY; screening colonoscopy; Surgeon:MILLA VEGA; [facility]     COLOSTOMY  2000     COLOSTOMY       HC COLONOSCOPY THRU STOMA, DIAGNOSTIC  2002    normal per report-no records available-records destroyed     HYSTERECTOMY, PAP NO LONGER INDICATED      done in [place]-cervical cancer     IMPLANT STIMULATOR SACRAL NERVE STAGE ONE Right 3/10/2015    Procedure: IMPLANT STIMULATOR SACRAL NERVE STAGE ONE;  Surgeon: Teodora Yusuf MD;  [facility]: [facility]     IMPLANT STIMULATOR SACRAL NERVE STAGE TWO Right 3/31/2015    Procedure: IMPLANT STIMULATOR SACRAL NERVE STAGE TWO;  Surgeon: Teodora Yusuf MD;  Location: [facility] OR     KNEE SURGERY       SALPINGO OOPHORECTOMY,R/L/JENNIFER      Salpingo Oophorectomy, RT/LT/JENNIFER       Social History:  Patient reports that she quit smoking about 2 years ago. She has never used smokeless tobacco. She reports that she drinks alcohol. She reports that she does not use drugs. Patient is on disability. She has grandchildren.   Reviewed and left in chart for clinician convenience.         Family History:  Not assessed today.    Medications:  Current Outpatient Medications   Medication Sig Dispense Refill     albuterol (PROAIR HFA/PROVENTIL HFA/VENTOLIN HFA) 108 (90 Base) MCG/ACT inhaler Inhale 2 puffs into the lungs every 6 hours 1 Inhaler 0     ARIPiprazole (ABILIFY) 20 MG tablet Take 1 tablet (20 mg) by mouth daily  Along with 5mg tab to total 25mg.  Prescribed by Dr. Isael Jiménez at [facility]       ARIPiprazole (ABILIFY) 5 MG tablet Take 1 tablet (5 mg) by mouth At Bedtime Along with 5mg tab to total 25mg.  Prescribed by Dr. Isael Jiménez at [facility] 30 tablet 1     aspirin ([employer] ASPIRIN ADULT LOW DOSE) 81 MG chewable tablet TAKE 1 TABLET (81 MG) BY MOUTH DAILY 90 tablet 3     BASAGLAR 100 UNIT/ML injection Take 55 units daily. 60 mL 0     blood glucose (ACCU-CHEK CELIA) test strip Use to test blood sugars 3 times daily or as directed. 300 each 0     blood glucose monitoring (ACCU-CHEK CELIA PLUS) meter device kit Use to test blood sugars 4 times daily or as directed. 1 kit 0     blood glucose monitoring (ACCU-CHEK FASTCLIX) lancets Use to test blood sugar 6 times daily or as directed. 510 each 1     buPROPion (WELLBUTRIN XL) 150 MG 24 hr tablet TAKE 3 TABLETS (450 MG) BY MOUTH EVERY MORNING       busPIRone HCl (BUSPAR) 30 MG tablet TAKE 1 TABLET BY MOUTH TWICE A DAY 60 tablet 0     Cholecalciferol (VITAMIN D) 2000 units tablet Take 2,000 Units by mouth daily 100 tablet 3     gabapentin (NEURONTIN) 600 MG tablet Take 1 tablet (600 mg) by mouth 3 times daily 90 tablet 11     hydrOXYzine (ATARAX) 25 MG tablet Take 1 tablet (25 mg) by mouth 3 times daily as needed for itching 90 tablet 10     ibuprofen (ADVIL/MOTRIN) 400 MG tablet Take 1 tablet (400 mg) by mouth every 8 hours as needed for moderate pain (with food)       insulin aspart (NOVOLOG FLEXPEN) 100 UNIT/ML pen 15 unit subcutaneous per meal three times a day. 30 mL 5     insulin pen needle 31G X 5 MM Use four times 4 day times a day 400 each 1     metFORMIN (GLUCOPHAGE) 1000 MG tablet TAKE 1 TABLET BY MOUTH TWICE A DAY WITH MEALS 180 tablet 0     Multiple Vitamin (DAILY-ISAAC) TABS TAKE 1 TABLET BY MOUTH DAILY 30 tablet 11     nystatin (MYCOSTATIN) 057801 UNIT/GM POWD Apply 1 dose topically 3 times daily as needed 60 g 3     omeprazole (PRILOSEC) 20  MG DR capsule TAKE 2 CAPSULES BY MOUTH DAILY TAKE 30-60 MINUTES BEFORE A MEAL. 180 capsule 3     order for DME Equipment being ordered: grab bar for bath tub/shower. 1 Units 0     order for DME Equipment being ordered: railing for stairs at home. 1 Units 0     order for DME Equipment being ordered: Knee walker  1 Device 0     order for DME by Device route continuous Air walker -  Use as instructed 1 Device 0     ORDER FOR DME Equipment being ordered:  Ostomy supplies.  Drain Pouch by [place] #.    Also needs Barrier by [place] #. 3 Month 4     ORDER FOR DME Equipment being ordered: Cane 1 Units 0     PARoxetine (PAXIL) 20 MG tablet TAKE 2 TABLETS AT BEDTIME (Prescribed by Dr. Isael Jim[facility]nez at [facility]) 30 tablet      pioglitazone (ACTOS) 30 MG tablet Take 1 tablet (30 mg) by mouth daily 90 tablet 1     pioglitazone (ACTOS) 45 MG tablet Take 1 tablet (45 mg) by mouth daily 90 tablet 3     propranolol ER (INDERAL LA) 60 MG 24 hr capsule Take 1 capsule (60 mg) by mouth daily 30 capsule 11     ramipril (ALTACE) 5 MG capsule Take 1 capsule (5 mg) by mouth daily 30 capsule 10     simvastatin (ZOCOR) 20 MG tablet Take 1 tablet (20 mg) by mouth At Bedtime 30 tablet 11     traMADol (ULTRAM) 50 MG tablet Take 0.5 tablets (25 mg) by mouth 3 times daily 45 tablet 0     traZODone (DESYREL) 50 MG tablet Take 50 mg by mouth At Bedtime  1     UNIVERSAL REMOVER WIPES EX MISC uses to remove barrier from colostomy bag at time of change 1 Each 3     BASAGLAR 100 UNIT/ML injection Inject 55 Units Subcutaneous At Bedtime 16.5 mL 3       Allergies   Allergen Reactions     Morphine Sulfate Itching       Review of Systems:  -Constitutional: Patient is otherwise feeling well, in usual state of health.   -Skin: As above in HPI. No additional skin concerns.    Physical exam:  Vitals: There were no vitals taken for this visit.  GEN: This is a well developed, well-nourished female in no acute distress, in a  pleasant mood.    SKIN: Sun-exposed skin, which includes the head/face, neck, both arms, digits, and/or nails was examined.   - 4 periungual warts on the left fourth finger. Smaller in size than last visit.  - larger verrucous papule 8 mm in size on the right palmar thumb  - No other lesions of concern on areas examined.     Impression/Plan:  1. Viral warts: Discussed the viral nature of warts. Explained that there are many treatment options and none of them work consistently for every wart. At this time, I recommended a combination of home and in office treatments. In office monthly, we will do cryotherapy and candida injections (expectations and risks discussed in detail).  In between visits, will have patient use over the counter salicylic acid nightly. This is treatment #5. Suspect periungal wart will take many treatments to clear.     Cryotherapy procedure note: After verbal consent and discussion of risks and benefits including but no limited to dyspigmentation/scar, blister, and pain, 5 was(were) treated with 1-2mm freeze border for 3 cycles with liquid nitrogen. Post cryotherapy instructions were provided.    After cleansing with alcohol, a total of 0.2 cc of candida antigen was injected into 2 suitable lesions. The patient tolerated the procedure well.        Prescribed 40% sal acid for an at home treatment nightly. If not covered by insurance, discussed that she can likely get cheaper OTC.     Follow-up in 4 weeks, earlier for new or changing lesions.       Staff Involved:  Scribe/Staff    Scribe Disclosure  I, Carmela Gage, am serving as a scribe to document services personally performed by Dr. Rossy Harding MD, based on data collection and the provider's statements to me.     Provider Disclosure:   The documentation recorded by the scribe accurately reflects the services I personally performed and the decisions made by me.    Rossy Harding MD    Department of  Dermatology  [facility]: Phone: [phone], Fax:[phone]  [facility]: Phone: [phone], Fax: [phone]

## 2019-07-11 NOTE — LETTER
7/11/2019         RE: Holly Muir  [address] [facility] [address]        Dear Colleague,    Thank you for referring your patient, Holly Muir, to the [facility]. Please see a copy of my visit note below.    [facility] Dermatology Note      Dermatology Problem List:  1. Verrucous vulgaris, hands  -s/p cryotherapy and candida injections 12/13/2018, 1/10/19, 3/14/2019, 6/6/2019, 7/11/2019  -home treatments with salicyclic acid 40%    Encounter Date: Jul 11, 2019    CC:  Chief Complaint   Patient presents with     Wart     bilateral hands         History of Present Illness:  Ms. Holly Muir is a 64 year old female who presents as a follow for wart treatment. She was last seen by Dr. Harding 6/6/2019. She has noticed improvement in the warts. She has not been using an at home treatment as her finances are limited, and she has not been able to afford. No new concerns today.    Past Medical History:   Patient Active Problem List   Diagnosis     Hyperlipidemia LDL goal <100     Severe major depression with psychotic features (H)     History of cervical cancer     S/P colostomy (H)     Mixed incontinence urge and stress     Memory loss     Type 2 diabetes mellitus, uncontrolled A1C goal <8%     Obesity     Pain in joint, hand     Trigger finger, acquired     Synovitis and tenosynovitis     Dermatochalasis     Presbyopia     OCD (obsessive compulsive disorder)     HTN, goal below 140/90     Cataracts, both eyes     Health Care Home     Lumbago     Sprain of lumbar region     Generalized osteoarthrosis, unspecified site     Rotator cuff impingement syndrome     Hypoglycemia due to insulin     Encounter for long-term (current) use of insulin (H)     Colostomy, evaluate (H)     Urinary retention     ACP (advance care planning)     Bilateral low back pain without sciatica     Anxiety     Fecal incontinence due to anorectal disorder     Visual  hallucination     Type 2 diabetes mellitus with hyperglycemia (H)     Chronic pain syndrome     Obesity (BMI 35.0-39.9) with comorbidity (H)     Anal stenosis     S/P partial colectomy     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     Past Surgical History:   Procedure Laterality Date     ARTHROSCOPY KNEE RT/LT      LT     BREAST LUMPECTOMY, RT/LT      LT-benign      C EXCIS PTERYGIUM  10/08    [facility]     C STOMACH SURGERY PROCEDURE UNLISTED       COLONOSCOPY  5/10/2012    Procedure:COLONOSCOPY; screening colonoscopy; Surgeon:MILLA VEGA; Location:[facility]     COLOSTOMY  2000     COLOSTOMY       HC COLONOSCOPY THRU STOMA, DIAGNOSTIC  2002    normal per report-no records available-records destroyed     HYSTERECTOMY, PAP NO LONGER INDICATED      done in [place]-cervical cancer     IMPLANT STIMULATOR SACRAL NERVE STAGE ONE Right 3/10/2015    Procedure: IMPLANT STIMULATOR SACRAL NERVE STAGE ONE;  Surgeon: Teodora Yusuf MD;  [facility]     IMPLANT STIMULATOR SACRAL NERVE STAGE TWO Right 3/31/2015    Procedure: IMPLANT STIMULATOR SACRAL NERVE STAGE TWO;  Surgeon: Teodora Yusuf MD;  Location: [facility] OR     KNEE SURGERY       SALPINGO OOPHORECTOMY,R/L/JENNIFER      Salpingo Oophorectomy, RT/LT/JENNIFER       Social History:  Patient reports that she quit smoking about 2 years ago. She has never used smokeless tobacco. She reports that she drinks alcohol. She reports that she does not use drugs. Patient is on disability. She has grandchildren.   Reviewed and left in chart for clinician convenience.         Family History:  Not assessed today.    Medications:  Current  Outpatient Medications   Medication Sig Dispense Refill     albuterol (PROAIR HFA/PROVENTIL HFA/VENTOLIN HFA) 108 (90 Base) MCG/ACT inhaler Inhale 2 puffs into the lungs every 6 hours 1 Inhaler 0     ARIPiprazole (ABILIFY) 20 MG tablet Take 1 tablet (20 mg) by mouth daily Along with 5mg tab to total 25mg.  Prescribed by Dr. Isael Jiménez at [facility]       ARIPiprazole (ABILIFY) 5 MG tablet Take 1 tablet (5 mg) by mouth At Bedtime Along with 5mg tab to total 25mg.  Prescribed by Dr. Isael Jiménez at [facility] 30 tablet 1     aspirin ([employer] ASPIRIN ADULT LOW DOSE) 81 MG chewable tablet TAKE 1 TABLET (81 MG) BY MOUTH DAILY 90 tablet 3     BASAGLAR 100 UNIT/ML injection Take 55 units daily. 60 mL 0     blood glucose (ACCU-CHEK CELIA) test strip Use to test blood sugars 3 times daily or as directed. 300 each 0     blood glucose monitoring (ACCU-CHEK CELIA PLUS) meter device kit Use to test blood sugars 4 times daily or as directed. 1 kit 0     blood glucose monitoring (ACCU-CHEK FASTCLIX) lancets Use to test blood sugar 6 times daily or as directed. 510 each 1     buPROPion (WELLBUTRIN XL) 150 MG 24 hr tablet TAKE 3 TABLETS (450 MG) BY MOUTH EVERY MORNING       busPIRone HCl (BUSPAR) 30 MG tablet TAKE 1 TABLET BY MOUTH TWICE A DAY 60 tablet 0     Cholecalciferol (VITAMIN D) 2000 units tablet Take 2,000 Units by mouth daily 100 tablet 3     gabapentin (NEURONTIN) 600 MG tablet Take 1 tablet (600 mg) by mouth 3 times daily 90 tablet 11     hydrOXYzine (ATARAX) 25 MG tablet Take 1 tablet (25 mg) by mouth 3 times daily as needed for itching 90 tablet 10     ibuprofen (ADVIL/MOTRIN) 400 MG tablet Take 1 tablet (400 mg) by mouth every 8 hours as needed for moderate pain (with food)       insulin aspart (NOVOLOG FLEXPEN) 100 UNIT/ML pen 15 unit subcutaneous per meal three times a day. 30 mL 5     insulin pen needle 31G X 5 MM Use four times 4 day times a day 400 each 1     metFORMIN (GLUCOPHAGE) 1000 MG  tablet TAKE 1 TABLET BY MOUTH TWICE A DAY WITH MEALS 180 tablet 0     Multiple Vitamin (DAILY-ISAAC) TABS TAKE 1 TABLET BY MOUTH DAILY 30 tablet 11     nystatin (MYCOSTATIN) 886729 UNIT/GM POWD Apply 1 dose topically 3 times daily as needed 60 g 3     omeprazole (PRILOSEC) 20 MG DR capsule TAKE 2 CAPSULES BY MOUTH DAILY TAKE 30-60 MINUTES BEFORE A MEAL. 180 capsule 3     order for DME Equipment being ordered: grab bar for bath tub/shower. 1 Units 0     order for DME Equipment being ordered: railing for stairs at home. 1 Units 0     order for DME Equipment being ordered: Knee walker  1 Device 0     order for DME by Device route continuous Air walker -  Use as instructed 1 Device 0     ORDER FOR DME Equipment being ordered:  Ostomy supplies.  Drain Pouch by [place] #.    Also needs Barrier by [place] #. 3 Month 4     ORDER FOR DME Equipment being ordered: Cane 1 Units 0     PARoxetine (PAXIL) 20 MG tablet TAKE 2 TABLETS AT BEDTIME (Prescribed by Dr. Isael Jiménez at [facility]) 30 tablet      pioglitazone (ACTOS) 30 MG tablet Take 1 tablet (30 mg) by mouth daily 90 tablet 1     pioglitazone (ACTOS) 45 MG tablet Take 1 tablet (45 mg) by mouth daily 90 tablet 3     propranolol ER (INDERAL LA) 60 MG 24 hr capsule Take 1 capsule (60 mg) by mouth daily 30 capsule 11     ramipril (ALTACE) 5 MG capsule Take 1 capsule (5 mg) by mouth daily 30 capsule 10     simvastatin (ZOCOR) 20 MG tablet Take 1 tablet (20 mg) by mouth At Bedtime 30 tablet 11     traMADol (ULTRAM) 50 MG tablet Take 0.5 tablets (25 mg) by mouth 3 times daily 45 tablet 0     traZODone (DESYREL) 50 MG tablet Take 50 mg by mouth At Bedtime  1     UNIVERSAL REMOVER WIPES EX MISC uses to remove barrier from colostomy bag at time of change 1 Each 3     BASAGLAR 100 UNIT/ML injection Inject 55 Units Subcutaneous At Bedtime 16.5 mL 3       Allergies   Allergen Reactions     Morphine Sulfate Itching       Review of  Systems:  -Constitutional: Patient is otherwise feeling well, in usual state of health.   -Skin: As above in HPI. No additional skin concerns.    Physical exam:  Vitals: There were no vitals taken for this visit.  GEN: This is a well developed, well-nourished female in no acute distress, in a pleasant mood.    SKIN: Sun-exposed skin, which includes the head/face, neck, both arms, digits, and/or nails was examined.   - 4 periungual warts on the left fourth finger. Smaller in size than last visit.  - larger verrucous papule 8 mm in size on the right palmar thumb  - No other lesions of concern on areas examined.     Impression/Plan:  1. Viral warts: Discussed the viral nature of warts. Explained that there are many treatment options and none of them work consistently for every wart. At this time, I recommended a combination of home and in office treatments. In office monthly, we will do cryotherapy and candida injections (expectations and risks discussed in detail).  In between visits, will have patient use over the counter salicylic acid nightly. This is treatment #5. Suspect periungal wart will take many treatments to clear.     Cryotherapy procedure note: After verbal consent and discussion of risks and benefits including but no limited to dyspigmentation/scar, blister, and pain, 5 was(were) treated with 1-2mm freeze border for 3 cycles with liquid nitrogen. Post cryotherapy instructions were provided.    After cleansing with alcohol, a total of 0.2 cc of candida antigen was injected into 2 suitable lesions. The patient tolerated the procedure well.        Prescribed 40% sal acid for an at home treatment nightly. If not covered by insurance, discussed that she can likely get cheaper OTC.     Follow-up in 4 weeks, earlier for new or changing lesions.       Staff Involved:  Scribe/Staff    Scribe Disclosure  I, Carmela Gage, am serving as a scribe to document services personally performed by Dr. Rossy Harding MD,  based on data collection and the provider's statements to me.     Provider Disclosure:   The documentation recorded by the scribe accurately reflects the services I personally performed and the decisions made by me.    Rossy Harding MD    Department of Dermatology  [facility]: Phone: [phone], Fax:[phone]  [facility]: Phone: [phone], Fax: [phone]                                          Again, thank you for allowing me to participate in the care of your patient.        Sincerely,        Rossy Harding MD

## 2019-07-16 DIAGNOSIS — M17.10 ARTHRITIS OF KNEE: ICD-10-CM

## 2019-07-16 DIAGNOSIS — M25.562 ARTHRALGIA OF BOTH KNEES: ICD-10-CM

## 2019-07-16 DIAGNOSIS — M25.561 ARTHRALGIA OF BOTH KNEES: ICD-10-CM

## 2019-07-16 RX ORDER — TRAMADOL HYDROCHLORIDE 50 MG/1
TABLET ORAL
Qty: 45 TABLET | Refills: 0 | Status: SHIPPED | OUTPATIENT
Start: 2019-07-16 | End: 2019-08-22

## 2019-07-16 NOTE — TELEPHONE ENCOUNTER
Rx faxed to pharmacy. No need to contact patient, refill came from pharmacy.  Bere ESTEBAN, CMA

## 2019-07-16 NOTE — TELEPHONE ENCOUNTER
Tramadol      Last Written Prescription Date:  6/19  Last Fill Quantity: 45,   # refills: 0  Last Office Visit: 6/27  Future Office visit:    Next 5 appointments (look out 90 days)    Jul 19, 2019 10:40 AM CDT  [facility]y [facility]rington DO  [facility] [address] [place] [id number]  [phone]   Aug 08, 2019  1:45 PM CDT  Return Visit with Rossy Harding MD  [facility] [address] [place] [id number]  [phone]   Sep 20, 2019  9:30 AM CDT  Return Visit with Elizabeth Medrano MD, [facility] ENDO NURSE  [facility] [address] [place] [id number]  [phone]         Routing refill request to provider for review/approval because:  Drug not on the [facility], [facility] or [facility] refill protocol or controlled substance

## 2019-07-17 ENCOUNTER — OFFICE VISIT (OUTPATIENT)
Dept: OPHTHALMOLOGY | Facility: CLINIC | Age: 64
End: 2019-07-17
Attending: INTERNAL MEDICINE
Payer: MEDICARE

## 2019-07-17 DIAGNOSIS — H57.813 BROW PTOSIS, BILATERAL: ICD-10-CM

## 2019-07-17 DIAGNOSIS — H02.834 DERMATOCHALASIS OF BOTH UPPER EYELIDS: Primary | ICD-10-CM

## 2019-07-17 DIAGNOSIS — H02.831 DERMATOCHALASIS OF BOTH UPPER EYELIDS: Primary | ICD-10-CM

## 2019-07-17 PROBLEM — F25.9 SCHIZOAFFECTIVE DISORDER (H): Status: ACTIVE | Noted: 2019-01-22

## 2019-07-17 PROBLEM — D64.9 ANEMIA: Status: ACTIVE | Noted: 2019-01-22

## 2019-07-17 PROBLEM — R07.89 CHEST PAIN, MUSCULOSKELETAL: Status: ACTIVE | Noted: 2019-01-22

## 2019-07-17 PROBLEM — E87.1 HYPONATREMIA: Status: ACTIVE | Noted: 2019-01-22

## 2019-07-17 PROBLEM — K21.9 GERD (GASTROESOPHAGEAL REFLUX DISEASE): Status: ACTIVE | Noted: 2019-01-23

## 2019-07-17 PROBLEM — E11.40 DIABETIC NEUROPATHY (H): Status: ACTIVE | Noted: 2019-01-22

## 2019-07-17 PROCEDURE — 92285 EXTERNAL OCULAR PHOTOGRAPHY: CPT | Performed by: OPHTHALMOLOGY

## 2019-07-17 PROCEDURE — 99203 OFFICE O/P NEW LOW 30 MIN: CPT | Performed by: OPHTHALMOLOGY

## 2019-07-17 ASSESSMENT — ENCOUNTER SYMPTOMS: JOINT SWELLING: 1

## 2019-07-17 ASSESSMENT — CONF VISUAL FIELD
OD_SUPERIOR_TEMPORAL_RESTRICTION: 3
OS_NORMAL: 1
OD_SUPERIOR_NASAL_RESTRICTION: 3
METHOD: COUNTING FINGERS

## 2019-07-17 ASSESSMENT — EXTERNAL EXAM - LEFT EYE: OS_EXAM: BROW PTOSIS, WITH FRONTALIS RELAXED, BROW IS BELOW SUPERIOR ORBITAL RIM AND LATERALLY INLINE WITH UPPER LASHES

## 2019-07-17 ASSESSMENT — VISUAL ACUITY
METHOD: SNELLEN - LINEAR
OD_PH_SC+: -1
OS_PH_SC: 20/30
OD_PH_SC: 20/25
OD_SC: 20/50
OS_SC+: +1
OS_SC: 20/50
OD_SC+: +1

## 2019-07-17 ASSESSMENT — TONOMETRY
OS_IOP_MMHG: 16
IOP_METHOD: ICARE
OD_IOP_MMHG: 16

## 2019-07-17 ASSESSMENT — SLIT LAMP EXAM - LIDS
COMMENTS: HEAVY DERMATOCHALASIS RESTING ON LASHES
COMMENTS: HEAVY DERMATOCHALASIS RESTING ON LASHES

## 2019-07-17 ASSESSMENT — EXTERNAL EXAM - RIGHT EYE: OD_EXAM: BROW PTOSIS, WITH FRONTALIS RELAXED, BROW IS BELOW SUPERIOR ORBITAL RIM AND LATERALLY INLINE WITH UPPER LASHES

## 2019-07-17 NOTE — NURSING NOTE
"Patient presents with:  Droopy Eye Lid Evaluation: Ref by Dr King for droopy eye lid eval. Pt states her peripheral va is impaired most noticeable when walking and reading she holds lids open to see better and states \"everything is too dark\"  pt states her she has been working to lower blood sugar since last a1C    Referring Provider:  Tiffany King MD PhD  [address]  [place] [address]        Rossy Garcia, COA    "

## 2019-07-17 NOTE — PROGRESS NOTES
"Oculoplastic Clinic New Patient    Patient: Holly Muir MRN# [id number]   YOB: 1955 Age: 64 year old   Date of Visit: Jul 17, 2019    CC: Droopy eyelids obstructing vision.              HPI:     Chief Complaint(s) and History of Present Illness(es)     Droopy Eye Lid Evaluation     Laterality: right upper lid and left upper lid    Comments: Ref by Dr King for droopy eye lid eval. Pt states her peripheral   va is impaired most noticeable when walking and reading she holds lids   open to see better and states \"everything is too dark\"       Holly Muir is a 64 year old female who has noted gradual onset of droopy eyelids over the past years. The droopy eyelid is interfering with activities of daily living including driving, and reading. The patient denies double vision, variability of the eyelid position, or dry eye symptoms. Has concerns about falling because she misses things off to the right.      EXAM:     MRD1: 2 right eye and 2 left eye   Dermatochalasis with excess skin touching eyelashes   Brow ptosis with brow resting below superior orbital rim worse on the right     VISUAL FIELD:  Right eye untaped:10 degrees Right eye taped:60 degrees  Left eye untaped:25 degrees Left eye taped:40 degrees    Lab Results   Component Value Date    A1C 11.9 05/10/2019    A1C 8.6 12/14/2018    A1C 10.3 08/08/2018    A1C 10.9 09/28/2017    A1C 12.0 05/26/2017     Reports her blood sugars have been under much better control recently with fasting around 130-140.     Assessment & Plan     Holly Muir is a 64 year old female with the following diagnoses:   1. Dermatochalasis of both upper eyelids    2. Brow ptosis, bilateral           Both upper eyelid blepharoplasty (NF, count fingers , BFPSS more aggressive OD)    See Dr. Greene for diabetic eye exam    ANTICOAGULATION:  Aspirin and ibuprofen       PHOTOS DEMONSTRATE:    Significant dermatochalasis with lids resting on eyelashes and obstructing visual " axis  Brow ptosis with thicker brow skin and hairs below the lateral superior orbital rim    Attending Physician Attestation:  Complete documentation of historical and exam elements from today's encounter can be found in the full encounter summary report (not reduplicated in this progress note).  I personally obtained the chief complaint(s) and history of present illness.  I confirmed and edited as necessary the review of systems, past medical/surgical history, family history, social history, and examination findings as documented by others; and I examined the patient myself.  I personally reviewed the relevant tests, images, and reports as documented above.  I formulated and edited as necessary the assessment and plan and discussed the findings and management plan with the patient and family. - Randolph Cook MD        Today with Holly Muir, I reviewed the indications, risks, benefits, and alternatives of the proposed surgical procedure including, but not limited to, failure obtain the desired result  and need for additional surgery, bleeding, infection, loss of vision, loss of the eye, and the remote possibility of permanent damage to any organ system or death with the use of anesthesia.  I provided multiple opportunities for the questions, answered all questions to the best of my ability, and confirmed that my answers and my discussion were understood.

## 2019-07-17 NOTE — LETTER
"    7/17/2019         RE: Holly Muir  [address] [address] [address]        Dear Colleague,    Thank you for referring your patient, Holly Muir, to the [facility]. Please see a copy of my visit note below.    Oculoplastic Clinic New Patient    Patient: Holly Muir MRN# [id number]   YOB: 1955 Age: 64 year old   Date of Visit: Jul 17, 2019    CC: Droopy eyelids obstructing vision.              HPI:     Chief Complaint(s) and History of Present Illness(es)     Droopy Eye Lid Evaluation     Laterality: right upper lid and left upper lid    Comments: Ref by Dr King for droopy eye lid eval. Pt states her peripheral   va is impaired most noticeable when walking and reading she holds lids   open to see better and states \"everything is too dark\"       Holly Muir is a 64 year old female who has noted gradual onset of droopy eyelids over the past years. The droopy eyelid is interfering with activities of daily living including driving, and reading. The patient denies double vision, variability of the eyelid position, or dry eye symptoms. Has concerns about falling because she misses things off to the right.      EXAM:     MRD1: 2 right eye and 2 left eye   Dermatochalasis with excess skin touching eyelashes   Brow ptosis with brow resting below superior orbital rim worse on the right     VISUAL FIELD:  Right eye untaped:10 degrees Right eye taped:60 degrees  Left eye untaped:25 degrees Left eye taped:40 degrees    Lab Results   Component Value Date    A1C 11.9 05/10/2019    A1C 8.6 12/14/2018    A1C 10.3 08/08/2018    A1C 10.9 09/28/2017    A1C 12.0 05/26/2017     Reports her blood sugars have been under much better control recently with fasting around 130-140.     Assessment & Plan     Holly Muir is a 64 year old female with the following diagnoses:   1. Dermatochalasis of both upper eyelids    2. Brow ptosis, bilateral           Both upper eyelid " blepharoplasty (NF, count fingers , BFPSS more aggressive OD)    See Dr. Greene for diabetic eye exam    ANTICOAGULATION:  Aspirin and ibuprofen       PHOTOS DEMONSTRATE:    Significant dermatochalasis with lids resting on eyelashes and obstructing visual axis  Brow ptosis with thicker brow skin and hairs below the lateral superior orbital rim    Attending Physician Attestation:  Complete documentation of historical and exam elements from today's encounter can be found in the full encounter summary report (not reduplicated in this progress note).  I personally obtained the chief complaint(s) and history of present illness.  I confirmed and edited as necessary the review of systems, past medical/surgical history, family history, social history, and examination findings as documented by others; and I examined the patient myself.  I personally reviewed the relevant tests, images, and reports as documented above.  I formulated and edited as necessary the assessment and plan and discussed the findings and management plan with the patient and family. - Randolph Cook MD        Today with Holly Muir, I reviewed the indications, risks, benefits, and alternatives of the proposed surgical procedure including, but not limited to, failure obtain the desired result  and need for additional surgery, bleeding, infection, loss of vision, loss of the eye, and the remote possibility of permanent damage to any organ system or death with the use of anesthesia.  I provided multiple opportunities for the questions, answered all questions to the best of my ability, and confirmed that my answers and my discussion were understood.           Again, thank you for allowing me to participate in the care of your patient.        Sincerely,        Randolph Cook MD

## 2019-07-19 ENCOUNTER — OFFICE VISIT (OUTPATIENT)
Dept: OPTOMETRY | Facility: CLINIC | Age: 64
End: 2019-07-19
Payer: MEDICARE

## 2019-07-19 ENCOUNTER — OFFICE VISIT (OUTPATIENT)
Dept: ORTHOPEDICS | Facility: CLINIC | Age: 64
End: 2019-07-19
Payer: MEDICARE

## 2019-07-19 VITALS
WEIGHT: 203 LBS | DIASTOLIC BLOOD PRESSURE: 64 MMHG | HEART RATE: 67 BPM | BODY MASS INDEX: 36.88 KG/M2 | SYSTOLIC BLOOD PRESSURE: 104 MMHG

## 2019-07-19 DIAGNOSIS — M17.12 PRIMARY OSTEOARTHRITIS OF LEFT KNEE: Primary | ICD-10-CM

## 2019-07-19 DIAGNOSIS — H25.813 COMBINED FORMS OF AGE-RELATED CATARACT OF BOTH EYES: ICD-10-CM

## 2019-07-19 DIAGNOSIS — H52.4 PRESBYOPIA: ICD-10-CM

## 2019-07-19 DIAGNOSIS — H52.223 REGULAR ASTIGMATISM OF BOTH EYES: ICD-10-CM

## 2019-07-19 DIAGNOSIS — H52.03 HYPEROPIA, BILATERAL: ICD-10-CM

## 2019-07-19 DIAGNOSIS — E11.3212: Primary | ICD-10-CM

## 2019-07-19 PROCEDURE — 92014 COMPRE OPH EXAM EST PT 1/>: CPT | Performed by: OPTOMETRIST

## 2019-07-19 PROCEDURE — 99213 OFFICE O/P EST LOW 20 MIN: CPT | Mod: 24 | Performed by: FAMILY MEDICINE

## 2019-07-19 ASSESSMENT — VISUAL ACUITY
OS_SC: 20/100
METHOD: SNELLEN - LINEAR
OS_SC+: -1
OD_SC: 20/40
OS_SC: 20/40
OD_SC: 20/100

## 2019-07-19 ASSESSMENT — REFRACTION_WEARINGRX
OS_AXIS: 180
OD_CYLINDER: +1.25
OS_ADD: +2.50
OD_SPHERE: +0.75
OS_SPHERE: +0.75
OS_CYLINDER: +1.25
OD_AXIS: 015
OD_ADD: +2.50

## 2019-07-19 ASSESSMENT — REFRACTION_MANIFEST
OD_AXIS: 015
OD_CYLINDER: +0.50
OS_SPHERE: +0.50
OD_SPHERE: +1.00
OS_ADD: +2.50
OS_CYLINDER: +1.00
OD_ADD: +2.50
OS_AXIS: 180

## 2019-07-19 ASSESSMENT — EXTERNAL EXAM - RIGHT EYE: OD_EXAM: NORMAL

## 2019-07-19 ASSESSMENT — SLIT LAMP EXAM - LIDS
COMMENTS: DERMATOCHALASIS - UPPER LID
COMMENTS: DERMATOCHALASIS - UPPER LID

## 2019-07-19 ASSESSMENT — CUP TO DISC RATIO
OS_RATIO: 0.2
OD_RATIO: 0.2

## 2019-07-19 ASSESSMENT — TONOMETRY
IOP_METHOD: TONOPEN
OD_IOP_MMHG: 15
OS_IOP_MMHG: 20

## 2019-07-19 ASSESSMENT — CONF VISUAL FIELD
METHOD: COUNTING FINGERS
OS_NORMAL: 1
OD_NORMAL: 1

## 2019-07-19 ASSESSMENT — PAIN SCALES - GENERAL: PAINLEVEL: WORST PAIN (10)

## 2019-07-19 ASSESSMENT — EXTERNAL EXAM - LEFT EYE: OS_EXAM: NORMAL

## 2019-07-19 ASSESSMENT — REFRACTION_FINALRX
OS_VPRISM: 1.5
OD_VPRISM: 1.5

## 2019-07-19 NOTE — PATIENT INSTRUCTIONS
You have diabetic retinopathy which is a leakage of blood vessels of the retina.  It is important to keep good control of your diabetes at this time.    Referral to Dr. Winn at [facility] for cataract evaluation- after eyelid surgery.  Wait 1 month post eyelid surgery.    Eyeglass prescription given.  Optional with prism until cataract evaluation.  Give the glasses 1-2 weeks to adjust to the new prescription.  You may get headaches or eyestrain as your eyes get used to the new prescription.  Sometimes the symptoms get worse before it gets better.  If any problems after 1-2 weeks schedule an appointment for a recheck.      Return in 1 year for a complete eye exam or sooner if needed.    Ariel Greene, OD      The affects of the dilating drops last for 4- 6 hours.  You will be more sensitive to light and vision will be blurry up close.  Mydriatic sunglasses were given if needed.      Optometry Providers       Clinic Locations                                 Telephone Number   Dr. Jaja Richardsdley   [place] [facility] [phone]     [place] [facility] Hours:                [facility] Hours:       [facility] Hours:   [address]   [address] [address]     [address] [place] [address]   [place] [address]    [place] [address]  Phone: [phone]                    Phone: [phone]     Phone: [phone]                      Monday 8:00-7:00                          Monday 8:00-7:00                          Monday 8:00-7:00              Tuesday 8:00-6:00                          Tuesday 8:00-7:00                          Tuesday 8:00-7:00              Wednesday 8:00-6:00                  Wednesday 8:00-7:00                   Wednesday 8:00-7:00      Thursday 8:00-6:00                        Thursday 8:00-7:00                         Thursday 8:00-7:00            Friday  8:00-5:00                              Friday 8:00-5:00                              Friday 8:00-5:00    [facility] Hours:   [address] [place] [address]  [phone]    Monday 8:00-7:00  Tuesday 8:00-7:00  Wednesday 8:00-7:00  Thursday 8:00-7:00  Friday 8:00-5:00  Please log on to [email or web address] to order your contact lenses.  The link is found on the [facility] page.  As always, Thank you for trusting us with your health care needs!

## 2019-07-19 NOTE — NURSING NOTE
Holly Muir's chief complaint for this visit includes:  Chief Complaint   Patient presents with     RECHECK     left knee pain      PCP: Tiffany King    Referring Provider:  No referring provider defined for this encounter.    /64   Pulse 67   Wt 92.1 kg (203 lb)   BMI 36.88 kg/m    Worst Pain (10)     Do you need any medication refills at today's visit? No

## 2019-07-19 NOTE — LETTER
7/19/2019         RE: Holly Muir  [address] [address] [facility] [address]        Dear Colleague,    Thank you for referring your patient, Holly Muir, to the [facility]. Please see a copy of my visit note below.    HISTORY OF PRESENT ILLNESS  Ms. Muir is a pleasant 64 year old year old female following up with bilateral knee pain.  I have seen Mera in the past for her knees, Holly's last visit was in May when I injected both of her knees with corticosteroid.  Her right knee is feeling great today.  Unfortunately she suffered a fall a couple of weeks ago.  Her left knee buckled and she fell directly onto her flexed knee.  She had a small abrasion at the time, this was quite painful.  Since then her knee has given her difficulty with most steps, especially going up and down stairs.  The pain gets worse as the day goes on.     PHYSICAL EXAM  Vitals:    07/19/19 1049   BP: 104/64   Pulse: 67   Weight: 92.1 kg (203 lb)     General  - normal appearance, in no obvious distress  CV  - normal popliteal pulse  Pulm  - normal respiratory pattern, non-labored  Musculoskeletal - left knee  - stance: antalgic gait  - inspection: generalized swelling, trace effusion  - palpation: medial joint line tenderness, patella and patellar tendon non-tender, normal popliteal pulse  - ROM: 100 degrees flexion, 0 degrees extension, painful active ROM    Neuro  - no sensory or motor deficit, grossly normal coordination, normal muscle tone  Skin  - no ecchymosis, erythema, warmth, or induration, no obvious rash  Psych  - interactive, appropriate, normal mood and affect          ASSESSMENT & PLAN  Ms. Muir is a 64 year old year old female following up with left knee pain.    Mera likely suffered a bone contusion, creating a acute on chronic osteoarthritic flare.    We did provide her with a hinged knee brace, she should wear this at all times of weightbearing.  She should also avoid exacerbating  activities and he can use oral analgesics and topical treatments as helpful.    If her pain continues despite these measures I would likely refer her to our surgical partners.    It was a pleasure seeing Holly.        Santy Harrington DO, [facility]      This note was constructed using Dragon dictation software, please excuse any minor errors in spelling, grammar, or syntax.      Again, thank you for allowing me to participate in the care of your patient.        Sincerely,        Santy Harrington DO

## 2019-07-19 NOTE — PROGRESS NOTES
Chief Complaint   Patient presents with     Diabetic Eye Exam        Lab Results   Component Value Date    A1C 11.9 05/10/2019    A1C 8.6 12/14/2018    A1C 10.3 08/08/2018    A1C 10.9 09/28/2017    A1C 12.0 05/26/2017       Last Eye Exam: 6/16  Dilated Previously: Yes    What are you currently using to see?  Lost glasses- using readers    Distance Vision Acuity: Noticed gradual change in both eyes    Near Vision Acuity: Not satisfied     Eye Comfort: good  Do you use eye drops? : No    Some vertical double vision while reading- not all the time- sporadically-has to stop and then will be ok     Medical, surgical and family histories reviewed and updated 7/19/2019.       Scheduled for lid surgery with Dr. Dickson - 8/16/19    OBJECTIVE: See Ophthalmology exam    ASSESSMENT:    ICD-10-CM    1. 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 Combined forms of age-related cataract of both eyes H25.813 OPHTHALMOLOGY ADULT REFERRAL   3. Presbyopia H52.4    4. Hyperopia, bilateral H52.03    5. Regular astigmatism of both eyes H52.223       PLAN:    Holly Muir aware  eye exam results will be sent to Tiffany King.  Patient Instructions   You have diabetic retinopathy which is a leakage of blood vessels of the retina.  It is important to keep good control of your diabetes at this time.    Referral to Dr. Winn at [facility] for cataract evaluation- after eyelid surgery.  Wait 1 month post eyelid surgery.    Eyeglass prescription given.  Optional with prism until cataract evaluation.  Give the glasses 1-2 weeks to adjust to the new prescription.  You may get headaches or eyestrain as your eyes get used to the new prescription.  Sometimes the symptoms get worse before it gets better.  If any problems after 1-2 weeks schedule an appointment for a recheck.      Return in 1 year for a complete eye exam or  sooner if needed.    Ariel Greene, OD

## 2019-07-19 NOTE — PROGRESS NOTES
HISTORY OF PRESENT ILLNESS  Ms. Muir is a pleasant 64 year old year old female following up with bilateral knee pain.  I have seen Mera in the past for her knees, Holly's last visit was in May when I injected both of her knees with corticosteroid.  Her right knee is feeling great today.  Unfortunately she suffered a fall a couple of weeks ago.  Her left knee buckled and she fell directly onto her flexed knee.  She had a small abrasion at the time, this was quite painful.  Since then her knee has given her difficulty with most steps, especially going up and down stairs.  The pain gets worse as the day goes on.     PHYSICAL EXAM  Vitals:    07/19/19 1049   BP: 104/64   Pulse: 67   Weight: 92.1 kg (203 lb)     General  - normal appearance, in no obvious distress  CV  - normal popliteal pulse  Pulm  - normal respiratory pattern, non-labored  Musculoskeletal - left knee  - stance: antalgic gait  - inspection: generalized swelling, trace effusion  - palpation: medial joint line tenderness, patella and patellar tendon non-tender, normal popliteal pulse  - ROM: 100 degrees flexion, 0 degrees extension, painful active ROM    Neuro  - no sensory or motor deficit, grossly normal coordination, normal muscle tone  Skin  - no ecchymosis, erythema, warmth, or induration, no obvious rash  Psych  - interactive, appropriate, normal mood and affect          ASSESSMENT & PLAN  Ms. Muir is a 64 year old year old female following up with left knee pain.    Mera likely suffered a bone contusion, creating a acute on chronic osteoarthritic flare.    We did provide her with a hinged knee brace, she should wear this at all times of weightbearing.  She should also avoid exacerbating activities and he can use oral analgesics and topical treatments as helpful.    If her pain continues despite these measures I would likely refer her to our surgical partners.    It was a pleasure seeing Holly.        Santy Harrington, , CAQSM      This note was  constructed using Dragon dictation software, please excuse any minor errors in spelling, grammar, or syntax.

## 2019-07-23 ENCOUNTER — TELEPHONE (OUTPATIENT)
Dept: PEDIATRICS | Facility: CLINIC | Age: 64
End: 2019-07-23

## 2019-07-23 DIAGNOSIS — M17.10 ARTHRITIS OF KNEE: ICD-10-CM

## 2019-07-23 DIAGNOSIS — M25.561 ARTHRALGIA OF BOTH KNEES: ICD-10-CM

## 2019-07-23 DIAGNOSIS — M25.562 ARTHRALGIA OF BOTH KNEES: ICD-10-CM

## 2019-07-23 RX ORDER — TRAMADOL HYDROCHLORIDE 50 MG/1
TABLET ORAL
Qty: 45 TABLET | Refills: 0 | Status: CANCELLED | OUTPATIENT
Start: 2019-07-23

## 2019-07-23 NOTE — TELEPHONE ENCOUNTER
Prescription was faxed to pharmacy on 7/16/19.  Contacted pharmacy and verified receipt, it is currently in process.  Message left for patient that it is available for pickup at pharmacy.    Rupali Singer RN

## 2019-07-23 NOTE — NURSING NOTE
Drug Administration Record    Drug Name: Candida  Dose: 0.2cc  Route administered: ID  NDC #: Candida (88145-383-40)  Amount of waste(mL): 0  Reason for waste: NA    LOT #:   SITE: See Note  : [facility]  EXPIRATION DATE: Jan 23, 2021    Edith Marks CMA

## 2019-07-23 NOTE — TELEPHONE ENCOUNTER
[facility] Call Center    Phone Message    May a detailed message be left on voicemail: yes    Reason for Call: Medication Refill Request    Has the patient contacted the pharmacy for the refill? Yes   Name of medication being requested: traMADol (ULTRAM) 50 MG tablet [23587]    Provider who prescribed the medication: Dr. King  Pharmacy: [facility]  Date medication is needed: 07/29/2019         Action Taken: Message routed to:  Primary Care p [phone]

## 2019-07-29 ENCOUNTER — OFFICE VISIT (OUTPATIENT)
Dept: PEDIATRICS | Facility: CLINIC | Age: 64
End: 2019-07-29
Payer: MEDICARE

## 2019-07-29 VITALS
HEIGHT: 62 IN | BODY MASS INDEX: 37.15 KG/M2 | DIASTOLIC BLOOD PRESSURE: 50 MMHG | WEIGHT: 201.9 LBS | TEMPERATURE: 98.6 F | HEART RATE: 92 BPM | OXYGEN SATURATION: 97 % | SYSTOLIC BLOOD PRESSURE: 110 MMHG

## 2019-07-29 DIAGNOSIS — Z01.818 PREOP GENERAL PHYSICAL EXAM: Primary | ICD-10-CM

## 2019-07-29 DIAGNOSIS — E11.65 UNCONTROLLED TYPE 2 DIABETES MELLITUS WITH HYPERGLYCEMIA, WITH LONG-TERM CURRENT USE OF INSULIN (H): ICD-10-CM

## 2019-07-29 DIAGNOSIS — H02.403 PTOSIS OF BOTH EYELIDS: ICD-10-CM

## 2019-07-29 DIAGNOSIS — Z79.4 UNCONTROLLED TYPE 2 DIABETES MELLITUS WITH HYPERGLYCEMIA, WITH LONG-TERM CURRENT USE OF INSULIN (H): ICD-10-CM

## 2019-07-29 LAB
ANION GAP SERPL CALCULATED.3IONS-SCNC: 4 MMOL/L (ref 3–14)
BUN SERPL-MCNC: 12 MG/DL (ref 7–30)
CALCIUM SERPL-MCNC: 9.3 MG/DL (ref 8.5–10.1)
CHLORIDE SERPL-SCNC: 103 MMOL/L (ref 94–109)
CO2 SERPL-SCNC: 29 MMOL/L (ref 20–32)
CREAT SERPL-MCNC: 0.72 MG/DL (ref 0.52–1.04)
ERYTHROCYTE [DISTWIDTH] IN BLOOD BY AUTOMATED COUNT: 14.7 % (ref 10–15)
GFR SERPL CREATININE-BSD FRML MDRD: 89 ML/MIN/{1.73_M2}
GLUCOSE SERPL-MCNC: 191 MG/DL (ref 70–99)
HBA1C MFR BLD: 8.3 % (ref 0–5.6)
HCT VFR BLD AUTO: 36.9 % (ref 35–47)
HGB BLD-MCNC: 11.8 G/DL (ref 11.7–15.7)
MCH RBC QN AUTO: 27.9 PG (ref 26.5–33)
MCHC RBC AUTO-ENTMCNC: 32 G/DL (ref 31.5–36.5)
MCV RBC AUTO: 87 FL (ref 78–100)
PLATELET # BLD AUTO: 346 10E9/L (ref 150–450)
POTASSIUM SERPL-SCNC: 3.9 MMOL/L (ref 3.4–5.3)
RBC # BLD AUTO: 4.23 10E12/L (ref 3.8–5.2)
SODIUM SERPL-SCNC: 136 MMOL/L (ref 133–144)
WBC # BLD AUTO: 5.7 10E9/L (ref 4–11)

## 2019-07-29 PROCEDURE — 36415 COLL VENOUS BLD VENIPUNCTURE: CPT | Performed by: NURSE PRACTITIONER

## 2019-07-29 PROCEDURE — 99215 OFFICE O/P EST HI 40 MIN: CPT | Performed by: NURSE PRACTITIONER

## 2019-07-29 PROCEDURE — 93000 ELECTROCARDIOGRAM COMPLETE: CPT | Performed by: NURSE PRACTITIONER

## 2019-07-29 PROCEDURE — 83036 HEMOGLOBIN GLYCOSYLATED A1C: CPT | Performed by: NURSE PRACTITIONER

## 2019-07-29 PROCEDURE — 80048 BASIC METABOLIC PNL TOTAL CA: CPT | Performed by: NURSE PRACTITIONER

## 2019-07-29 PROCEDURE — 85027 COMPLETE CBC AUTOMATED: CPT | Performed by: NURSE PRACTITIONER

## 2019-07-29 ASSESSMENT — ANXIETY QUESTIONNAIRES
6. BECOMING EASILY ANNOYED OR IRRITABLE: NOT AT ALL
5. BEING SO RESTLESS THAT IT IS HARD TO SIT STILL: NOT AT ALL
2. NOT BEING ABLE TO STOP OR CONTROL WORRYING: NOT AT ALL
3. WORRYING TOO MUCH ABOUT DIFFERENT THINGS: NOT AT ALL
IF YOU CHECKED OFF ANY PROBLEMS ON THIS QUESTIONNAIRE, HOW DIFFICULT HAVE THESE PROBLEMS MADE IT FOR YOU TO DO YOUR WORK, TAKE CARE OF THINGS AT HOME, OR GET ALONG WITH OTHER PEOPLE: NOT DIFFICULT AT ALL
1. FEELING NERVOUS, ANXIOUS, OR ON EDGE: NOT AT ALL
GAD7 TOTAL SCORE: 0
7. FEELING AFRAID AS IF SOMETHING AWFUL MIGHT HAPPEN: NOT AT ALL

## 2019-07-29 ASSESSMENT — MIFFLIN-ST. JEOR: SCORE: 1428.03

## 2019-07-29 ASSESSMENT — PATIENT HEALTH QUESTIONNAIRE - PHQ9
5. POOR APPETITE OR OVEREATING: NOT AT ALL
SUM OF ALL RESPONSES TO PHQ QUESTIONS 1-9: 0

## 2019-07-29 NOTE — PATIENT INSTRUCTIONS
Diabetes Medication Use  -----Hold usual oral and non-insulin diabetic meds (e.g. Metformin, Actos, Glipizide) while NPO.   -----Take 80% of long acting insulin (e.g. Lantus, NPH) while NPO (fasting)  -----Hold short acting insulin (e.g. Novolog, Humalog) while NPO (fasting)      Anticoagulant or Antiplatelet Medication Use  ASPIRIN: Discontinue ASA 7-10 days prior to procedure to reduce bleeding risk.  It should be resumed post-operatively.    Will follow up and/or notify patient on results via phone or mail to determine further need for followup      Before Your Surgery      Call your surgeon if there is any change in your health. This includes signs of a cold or flu (such as a sore throat, runny nose, cough, rash or fever).    Do not smoke, drink alcohol or take over the counter medicine (unless your surgeon or primary care doctor tells you to) for the 24 hours before and after surgery.    If you take prescribed drugs: Follow your doctor s orders about which medicines to take and which to stop until after surgery.    Eating and drinking prior to surgery: follow the instructions from your surgeon    Take a shower or bath the night before surgery. Use the soap your surgeon gave you to gently clean your skin. If you do not have soap from your surgeon, use your regular soap. Do not shave or scrub the surgery site.  Wear clean pajamas and have clean sheets on your bed.

## 2019-07-29 NOTE — PROGRESS NOTES
[address] [address]  [phone]  Dept: [phone]    PRE-OP EVALUATION:  Today's date: 2019    Holly Muir (: 1955) presents for pre-operative evaluation assessment as requested by Dr. Cook.  She requires evaluation and anesthesia risk assessment prior to undergoing surgery/procedure for treatment of eyelids .    Proposed Surgery/ Procedure:BILATERAL UPPER LID BLEPHAROPLASTY  Date of Surgery/ Procedure: 19  Time of Surgery/ Procedure: 12:30PM  Hospital/Surgical Facility: [facility]  Fax number for surgical facility:   Primary Physician: Tiffany King  Type of Anesthesia Anticipated: Monitor Anesthesia Care    Patient has a Health Care Directive or Living Will:  NO    1. NO - Do you have a history of heart attack, stroke, stent, bypass or surgery on an artery in the head, neck, heart or legs?  2. NO - Do you ever have any pain or discomfort in your chest?  3. NO - Do you have a history of  Heart Failure?  4. NO - Are you troubled by shortness of breath when: walking on the level, up a slight hill or at night?  5. NO - Do you currently have a cold, bronchitis or other respiratory infection?  6. NO - Do you have a cough, shortness of breath or wheezing?  7. YES - DO YOU SOMETIMES GET PAINS IN THE CALVES OF YOUR LEGS WHEN YOU WALK? Arthritis in both legs  8. NO - Do you or anyone in your family have previous history of blood clots?  9. NO - Do you or does anyone in your family have a serious bleeding problem such as prolonged bleeding following surgeries or cuts?  10. NO - Have you ever had problems with anemia or been told to take iron pills?  11. NO - Have you had any abnormal blood loss such as black, tarry or bloody stools, or abnormal vaginal bleeding?  12. YES - HAVE YOU EVER HAD A BLOOD TRANSFUSION? 10+ years ago  13. NO - Have you or any of your relatives ever had problems with anesthesia?  14. NO - Do you have sleep  apnea, excessive snoring or daytime drowsiness?  15. NO - Do you have any prosthetic heart valves?  16. NO - Do you have prosthetic joints?  17. NO - Is there any chance that you may be pregnant?      HPI:     HPI related to upcoming procedure: undergoing surgery/procedure for treatment of eyelids .    Proposed Surgery/ Procedure:BILATERAL UPPER LID BLEPHAROPLASTY      See problem list for active medical problems.  Problems all longstanding and stable, except as noted/documented.  See ROS for pertinent symptoms related to these conditions.    DEPRESSION - Patient has a long history of Depression of moderate severity requiring medication for control with recent symptoms being stable..Current symptoms of depression include none.     DIABETES - Patient has a longstanding history of DiabetesType Type II . Patient is being treated with diet, oral agents and insulin injections and denies significant side effects. Control has been fair. Complicating factors include but are not limited to: hypertension, diabetic retinopathy   States glucoses have been doing better and even having some lows recently   Recently had her short acting insulin increased     HYPERTENSION - Patient has longstanding history of HTN , currently denies any symptoms referable to elevated blood pressure. Specifically denies chest pain, palpitations, dyspnea, orthopnea, PND or peripheral edema. Blood pressure readings have been in normal range. Current medication regimen is as listed below. Patient denies any side effects of medication.       MEDICAL HISTORY:     Patient Active Problem List    Diagnosis Date Noted     Anal stenosis 06/27/2019     Priority: Medium     S/P partial colectomy 06/27/2019     Priority: Medium     GERD (gastroesophageal reflux disease) 01/23/2019     Priority: Medium     Anemia 01/22/2019     Priority: Medium     Chest pain, musculoskeletal 01/22/2019     Priority: Medium     Diabetic neuropathy (H) 01/22/2019     Priority:  Medium     Hyponatremia 01/22/2019     Priority: Medium     Schizoaffective disorder (H) 01/22/2019     Priority: Medium     Obesity (BMI 35.0-39.9) with comorbidity (H) 11/14/2018     Priority: Medium     Chronic pain syndrome 08/08/2018     Priority: Medium     Chronic DJD the knee and back. Tramadol 2 tablets daily. Initial agreement signed in 2014.   New pain agreement signed 8/8/2018        Type 2 diabetes mellitus with hyperglycemia (H) 06/08/2016     Priority: Medium     Visual hallucination 04/21/2016     Priority: Medium     Fecal incontinence due to anorectal disorder 09/28/2015     Priority: Medium     Anxiety 09/02/2015     Priority: Medium     Bilateral low back pain without sciatica 06/17/2015     Priority: Medium     ACP (advance care planning) 02/12/2015     Priority: Medium     Advance Care Planning:ACP Review and Resources Provided:  Reviewed chart for advance care plan.  Holly Muir has no plan or code status on file. Discussed available resources and provided with information. Confirmed/documented designated decision maker(s). See permanent comments section of demographics in clinical tab. Added by Zara Elias on 2/12/2015           Urinary retention 01/22/2015     Priority: Medium     Colostomy, evaluate (H) 10/17/2014     Priority: Medium     Encounter for long-term (current) use of insulin (H) 10/12/2014     Priority: Medium     Hypoglycemia due to insulin 09/17/2014     Priority: Medium     Rotator cuff impingement syndrome 08/07/2014     Priority: Medium     Health Care Home 09/20/2013     Priority: Medium     Date:  3-4-16  Status:  Accepted           Cataracts, both eyes 05/08/2013     Priority: Medium     Dermatochalasis 05/10/2012     Priority: Medium     Presbyopia 05/10/2012     Priority: Medium     OCD (obsessive compulsive disorder) 05/10/2012     Priority: Medium     HTN, goal below 140/90 05/10/2012     Priority: Medium     Pain in joint, hand 01/25/2012     Priority: Medium      Trigger finger, acquired 01/25/2012     Priority: Medium     Problem list name updated by automated process. Provider to review       Synovitis and tenosynovitis 01/25/2012     Priority: Medium     Problem list name updated by automated process. Provider to review       Obesity 03/23/2011     Priority: Medium     Type 2 diabetes mellitus, uncontrolled A1C goal <8% 10/31/2010     Priority: Medium     3:1 insulin to carb ratio       Mixed incontinence urge and stress 06/01/2010     Priority: Medium     Memory loss 06/01/2010     Priority: Medium     Diabetes coma around 2003       S/P colostomy (H) 04/03/2010     Priority: Medium     Due to injury to colon while she had a hysterectomy (age late 30s):  Seen Dr. Doherty in colorectal surgery at [facility]-does not want to operated. Needs her social support and memory loss issues be resolved first.        History of cervical cancer 09/23/2009     Priority: Medium     status post NATALIE/BSO. Being followed by Dr. Angelita Addison at [facility] gyn/onc       Severe major depression with psychotic features (H) 07/31/2009     Priority: Medium     Psychiatrist: Dr. Perkins at [facility].   Neuropsychological evalation at [facility] on 12/1/09: lower end cognitive functioning and multifactorial in etiologies including effects of psychosis, medications nd perhaps non-restorative sleep. Also has features of OCD. Recommend psychiatry referral for further evaluation of past Dx of schizophrenia and also psychotherapy.   Sees a therapist monthly now (has met goals) but will monitor for relapse        Hyperlipidemia LDL goal <100 01/27/2009     Priority: Medium     Lumbago 01/29/2014     Priority: Low     Sprain of lumbar region 01/29/2014     Priority: Low     Generalized osteoarthrosis, unspecified site 01/29/2014     Priority: Low      Past Medical History:   Diagnosis Date     Cataracts, both eyes 5/8/2013     Cervical cancer (H)       Diabetes      Diabetic retinopathy (H)      HTN      Inflammatory arthritis      Major depression      Memory loss      Nephropathy      OA (osteoarthritis) of knee 9/11/2013     Other and unspecified hyperlipidemia      Pterygium eye      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     Past Surgical History:   Procedure Laterality Date     ARTHROSCOPY KNEE RT/LT      LT     BREAST LUMPECTOMY, RT/LT      LT-benign      C EXCIS PTERYGIUM  10/08    [facility]     C STOMACH SURGERY PROCEDURE UNLISTED       COLONOSCOPY  5/10/2012    Procedure:COLONOSCOPY; screening colonoscopy; Surgeon:MILLA VEGA; Location:[facility] OR     COLOSTOMY  2000     COLOSTOMY       [facility] COLONOSCOPY THRU STOMA, DIAGNOSTIC  2002    normal per report-no records available-records destroyed     HYSTERECTOMY, PAP NO LONGER INDICATED      done in [place]-cervical cancer     IMPLANT STIMULATOR SACRAL NERVE STAGE ONE Right 3/10/2015    Procedure: IMPLANT STIMULATOR SACRAL NERVE STAGE ONE;  Surgeon: Teodora Yusuf MD;  Location: [facility] OR     IMPLANT STIMULATOR SACRAL NERVE STAGE TWO Right 3/31/2015    Procedure: IMPLANT STIMULATOR SACRAL NERVE STAGE TWO;  Surgeon: Teodora Yusuf MD;  Location: [facility] OR     KNEE SURGERY       SALPINGO OOPHORECTOMY,R/L/JENNIFER      Salpingo Oophorectomy, RT/LT/JENNIFER     Current Outpatient Medications   Medication Sig Dispense Refill     albuterol (PROAIR HFA/PROVENTIL HFA/VENTOLIN HFA) 108 (90 Base) MCG/ACT inhaler Inhale 2 puffs into the lungs every 6 hours 1 Inhaler 0     ARIPiprazole (ABILIFY) 20 MG tablet Take 1 tablet (20 mg) by mouth daily Along with 5mg tab to total 25mg.  Prescribed by Dr. Isael Jiménez at [facility]       ARIPiprazole (ABILIFY) 5 MG tablet Take 1 tablet (5 mg) by mouth At Bedtime Along with 5mg tab to total 25mg.  Prescribed by Dr. Isael Jiménez at [facility] 30 tablet 1     aspirin ([facility] ASPIRIN ADULT LOW DOSE) 81 MG chewable tablet TAKE 1 TABLET (81 MG) BY MOUTH DAILY 90  tablet 3     BASAGLAR 100 UNIT/ML injection Take 55 units daily. 60 mL 0     blood glucose (ACCU-CHEK CELIA) test strip Use to test blood sugars 3 times daily or as directed. 300 each 0     blood glucose monitoring (ACCU-CHEK CELIA PLUS) meter device kit Use to test blood sugars 4 times daily or as directed. 1 kit 0     blood glucose monitoring (ACCU-CHEK FASTCLIX) lancets Use to test blood sugar 6 times daily or as directed. 510 each 1     buPROPion (WELLBUTRIN XL) 150 MG 24 hr tablet TAKE 3 TABLETS (450 MG) BY MOUTH EVERY MORNING       busPIRone HCl (BUSPAR) 30 MG tablet TAKE 1 TABLET BY MOUTH TWICE A DAY 60 tablet 0     Cholecalciferol (VITAMIN D) 2000 units tablet Take 2,000 Units by mouth daily 100 tablet 3     gabapentin (NEURONTIN) 600 MG tablet Take 1 tablet (600 mg) by mouth 3 times daily 90 tablet 11     hydrOXYzine (ATARAX) 25 MG tablet Take 1 tablet (25 mg) by mouth 3 times daily as needed for itching 90 tablet 10     ibuprofen (ADVIL/MOTRIN) 400 MG tablet Take 1 tablet (400 mg) by mouth every 8 hours as needed for moderate pain (with food)       insulin aspart (NOVOLOG FLEXPEN) 100 UNIT/ML pen 15 unit subcutaneous per meal three times a day. 30 mL 5     insulin pen needle 31G X 5 MM Use four times 4 day times a day 400 each 1     metFORMIN (GLUCOPHAGE) 1000 MG tablet TAKE 1 TABLET BY MOUTH TWICE A DAY WITH MEALS 180 tablet 0     Multiple Vitamin (DAILY-ISAAC) TABS TAKE 1 TABLET BY MOUTH DAILY 30 tablet 11     nystatin (MYCOSTATIN) 264350 UNIT/GM POWD Apply 1 dose topically 3 times daily as needed 60 g 3     omeprazole (PRILOSEC) 20 MG DR capsule TAKE 2 CAPSULES BY MOUTH DAILY TAKE 30-60 MINUTES BEFORE A MEAL. 180 capsule 3     order for DME Equipment being ordered: grab bar for bath tub/shower. 1 Units 0     order for DME Equipment being ordered: railing for stairs at home. 1 Units 0     order for DME Equipment being ordered: Knee walker  1 Device 0     order for DME by Device route continuous Air  walker -  Use as instructed 1 Device 0     ORDER FOR DME Equipment being ordered:  Ostomy supplies.  Drain Pouch by [place] #.    Also needs Barrier by [place] #. 3 Month 4     ORDER FOR DME Equipment being ordered: Cane 1 Units 0     PARoxetine (PAXIL) 20 MG tablet TAKE 2 TABLETS AT BEDTIME (Prescribed by Dr. Isael Jim[facility]nez at [facility]) 30 tablet      pioglitazone (ACTOS) 30 MG tablet Take 1 tablet (30 mg) by mouth daily 90 tablet 1     pioglitazone (ACTOS) 45 MG tablet Take 1 tablet (45 mg) by mouth daily 90 tablet 3     propranolol ER (INDERAL LA) 60 MG 24 hr capsule Take 1 capsule (60 mg) by mouth daily 30 capsule 11     ramipril (ALTACE) 5 MG capsule Take 1 capsule (5 mg) by mouth daily 30 capsule 10     salicylic acid (MEDIPLAST) 40 % miscellaneous Apply to warts nightly as tolerated. 25 each 11     simvastatin (ZOCOR) 20 MG tablet Take 1 tablet (20 mg) by mouth At Bedtime 30 tablet 11     traMADol (ULTRAM) 50 MG tablet TAKE 1/2 TABLET BY MOUTH 3 TIMES A DAY 45 tablet 0     traZODone (DESYREL) 50 MG tablet Take 50 mg by mouth At Bedtime  1     UNIVERSAL REMOVER WIPES EX MISC uses to remove barrier from colostomy bag at time of change 1 Each 3     BASAGLAR 100 UNIT/ML injection Inject 55 Units Subcutaneous At Bedtime 16.5 mL 3     OTC products: no use of herbal medications or other supplements and Aspirin    Allergies   Allergen Reactions     Metformin      Other reaction(s): Diabetes, Exacerbation  Other reaction(s): Diabetes, Exacerbation     Morphine Sulfate Itching      Latex Allergy: NO    Social History     Tobacco Use     Smoking status: Former Smoker     Last attempt to quit: 2016     Years since quittin.9     Smokeless tobacco: Never Used   Substance Use Topics     Alcohol use: Yes     Alcohol/week: 0.0 oz     Comment: social occasions     History   Drug Use No       REVIEW OF SYSTEMS:   CONSTITUTIONAL: NEGATIVE for fever, chills, change in weight  INTEGUMENTARY/SKIN:  "NEGATIVE for rash   EYES: POSITIVE for ptosis bilateral and NEGATIVE for diplopia, eye pain bilateral and tearing bilateral  ENT/MOUTH: NEGATIVE for ear, mouth and throat problems  RESP: NEGATIVE for significant cough or SOB  CV: NEGATIVE for chest pain, palpitations or peripheral edema  GI: NEGATIVE for nausea, abdominal pain, heartburn, or change in bowel habits  : NEGATIVE for frequency, dysuria, or hematuria  MUSCULOSKELETAL: NEGATIVE for significant arthralgias or myalgia  NEURO: NEGATIVE for weakness, dizziness or paresthesias  ENDOCRINE: NEGATIVE for temperature intolerance, skin/hair changes  ENDOCRINE: POSITIVE  for HX diabetes  HEME: NEGATIVE for bleeding problems  PSYCHIATRIC: POSITIVE forHx anxiety and Hx depression and NEGATIVE foranxiety, depressed mood and  Sees psychiatry regularly     EXAM:   /50 (BP Location: Right arm, Patient Position: Sitting, Cuff Size: Adult Regular)   Pulse 92   Temp 98.6  F (37  C) (Temporal)   Ht 1.581 m (5' 2.25\")   Wt 91.6 kg (201 lb 14.4 oz)   SpO2 97%   Breastfeeding? No   BMI 36.63 kg/m      GENERAL APPEARANCE: healthy, alert and no distress     EYES: Eyes grossly normal to inspection and eyelids- ptosis OU     HENT: ear canals and TM's normal and nose and mouth without ulcers or lesions     NECK: normal and supple     RESP: lungs clear to auscultation - no rales, rhonchi or wheezes     CV: regular rates and rhythm, no murmur, click or rub and no irregular beats     ABDOMEN: soft, nontender     MS: extremities normal- no gross deformities noted, no evidence of inflammation in joints, FROM in all extremities.     SKIN: no suspicious lesions or rashes     NEURO: Normal strength and tone, sensory exam grossly normal, mentation intact and speech normal     PSYCH: mentation appears normal. and affect normal/bright     LYMPHATICS: No cervical adenopathy    DIAGNOSTICS:   EKG: appears normal, NSR, normal axis, normal intervals, no acute ST/T changes c/w " ischemia, no LVH by voltage criteria  Results for orders placed or performed in visit on 07/29/19   Basic metabolic panel   Result Value Ref Range    Sodium 136 133 - 144 mmol/L    Potassium 3.9 3.4 - 5.3 mmol/L    Chloride 103 94 - 109 mmol/L    Carbon Dioxide 29 20 - 32 mmol/L    Anion Gap 4 3 - 14 mmol/L    Glucose 191 (H) 70 - 99 mg/dL    Urea Nitrogen 12 7 - 30 mg/dL    Creatinine 0.72 0.52 - 1.04 mg/dL    GFR Estimate 89 >60 mL/min/[1.73_m2]    GFR Estimate If Black >90 >60 mL/min/[1.73_m2]    Calcium 9.3 8.5 - 10.1 mg/dL   CBC with platelets   Result Value Ref Range    WBC 5.7 4.0 - 11.0 10e9/L    RBC Count 4.23 3.8 - 5.2 10e12/L    Hemoglobin 11.8 11.7 - 15.7 g/dL    Hematocrit 36.9 35.0 - 47.0 %    MCV 87 78 - 100 fl    MCH 27.9 26.5 - 33.0 pg    MCHC 32.0 31.5 - 36.5 g/dL    RDW 14.7 10.0 - 15.0 %    Platelet Count 346 150 - 450 10e9/L   Hemoglobin A1c   Result Value Ref Range    Hemoglobin A1C 8.3 (H) 0 - 5.6 %   EKG 12-lead complete w/read - Clinics    Narrative    EKG: appears normal, NSR, normal axis, normal intervals, no acute ST/T changes c/w ischemia, no LVH by voltage criteria            *Note: Due to a large number of results and/or encounters for the requested time period, some results have not been displayed. A complete set of results can be found in Results Review.   '  IMPRESSION:   Reason for surgery/procedure:  undergoing surgery/procedure for treatment of eyelids .    Proposed Surgery/ Procedure:BILATERAL UPPER LID BLEPHAROPLASTY  Date of Surgery/ Procedure: 8/16/19    Diagnosis/reason for consult: preop evaluation     The proposed surgical procedure is considered INTERMEDIATE risk.    REVISED CARDIAC RISK INDEX  The patient has the following serious cardiovascular risks for perioperative complications such as (MI, PE, VFib and 3  AV Block):  Diabetes Mellitus (on Insulin)  INTERPRETATION: 1 risks: Class II (low risk - 0.9% complication rate)    The patient has the following additional  risks for perioperative complications:  The ASCVD Risk score (Hamillpetrona RANGEL Jr., et al., 2013) failed to calculate for the following reasons:    The valid total cholesterol range is 130 to 320 mg/dL    Holly was seen today for pre-op exam.    Diagnoses and all orders for this visit:    Preop general physical exam  -     EKG 12-lead complete w/read - Clinics  -     Basic metabolic panel  -     CBC with platelets  -     Hemoglobin A1c    Ptosis of both eyelids  -     Basic metabolic panel  -     CBC with platelets  -     Hemoglobin A1c    Uncontrolled type 2 diabetes mellitus with hyperglycemia, with long-term current use of insulin (H)  -     Basic metabolic panel  -     CBC with platelets  -     Hemoglobin A1c    RECOMMENDATIONS:     Diabetes Medication Use  -----Hold usual oral and non-insulin diabetic meds (e.g. Metformin, Actos, Glipizide) while NPO.   -----Take 80% of long acting insulin (e.g. Lantus, NPH) while NPO (fasting)  -----Hold short acting insulin (e.g. Novolog, Humalog) while NPO (fasting)      Anticoagulant or Antiplatelet Medication Use  ASPIRIN: Discontinue ASA 7-10 days prior to procedure to reduce bleeding risk.  It should be resumed post-operatively.        APPROVAL GIVEN to proceed with proposed procedure, without further diagnostic evaluation       Signed Electronically by: KASIE Silver CNP    Copy of this evaluation report is provided to requesting physician.    [facility] Preop Guidelines    Revised Cardiac Risk Index

## 2019-07-29 NOTE — NURSING NOTE
"Chief Complaint   Patient presents with     Pre-Op Exam     DOS:8/16/19       Initial /50 (BP Location: Right arm, Patient Position: Sitting, Cuff Size: Adult Regular)   Pulse 92   Temp 98.6  F (37  C) (Temporal)   Ht 1.581 m (5' 2.25\")   Wt 91.6 kg (201 lb 14.4 oz)   SpO2 97%   Breastfeeding? No   BMI 36.63 kg/m   Estimated body mass index is 36.63 kg/m  as calculated from the following:    Height as of this encounter: 1.581 m (5' 2.25\").    Weight as of this encounter: 91.6 kg (201 lb 14.4 oz).  Medication Reconciliation: complete      JERED Oconnor      "

## 2019-07-29 NOTE — Clinical Note
Hi Mikayla Can you touch base with Holly sounds like she is having some drops in her glucose levels I am checking A1c today as she is here for a preopThanks Rossy

## 2019-07-30 ENCOUNTER — TELEPHONE (OUTPATIENT)
Dept: ENDOCRINOLOGY | Facility: CLINIC | Age: 64
End: 2019-07-30

## 2019-07-30 ASSESSMENT — ANXIETY QUESTIONNAIRES: GAD7 TOTAL SCORE: 0

## 2019-07-30 NOTE — TELEPHONE ENCOUNTER
Meredith, homecare nurse, left message on clinic voicemail. Patient is having colonoscopy on 8/2 and requesting to confirm insulin instructions given by Rossy Harrington NP at yesterdays preop visit.     Please call Meredith to discuss [phone].    Will forward to LEORA Saldana to review.    Argelia Maciel LPN  Diabetes Clinic Coordinator   Adult Endocrinology and Pediatric Specialty Clinics  [facility]

## 2019-07-31 ENCOUNTER — TELEPHONE (OUTPATIENT)
Dept: ENDOCRINOLOGY | Facility: CLINIC | Age: 64
End: 2019-07-31

## 2019-07-31 NOTE — PROGRESS NOTES
Spoke with pt. Please see telephone encounter from 07/31/19.    Mikayla Sellers, RN, BSN, CDE   [facility]

## 2019-07-31 NOTE — TELEPHONE ENCOUNTER
"Cde spoke with pt. Discussed lab resutls. Pt praised for improved A1c. Pt informred Cde she I has lenin having \"a lot of lows\". Statesthas been cutting back on portion size. States lows can happen at any time of day, pre or post meal. States bg this morning was 103. Post breakfast bg got down to 63. Pt states current insulin doses are: Lantus: 60 units q pm, Novolo units with smaller meals and 15 units with larger meals.      Pt advised to reduce Lantus to 58 units and reduce meal dose to 11 units with a smaller meal and 14 units with a larger meal.     Pt also advised to take 48 units Lantus tomorrow night, which is night before procedure pt is having Friday morning.   Pt verbalized understanding of all the above.    Mikayla Sellers, RN, BSN, CDE   [facility]   "

## 2019-07-31 NOTE — TELEPHONE ENCOUNTER
Cde spoke with pt's home health nurse, Meredith. Advised pt is to take 80% of basal insulin dose, or 48 units, night before or morning of procedure. Hold Novolog/Humalog morning of proceure. Holdl PO meds as well. Reviewed AVS from visit on 7/29 with Meredith, which listed these instructions.      Mikayla Sellers RN, BSN, CDE   [facility]

## 2019-08-02 ENCOUNTER — ANESTHESIA EVENT (OUTPATIENT)
Dept: SURGERY | Facility: AMBULATORY SURGERY CENTER | Age: 64
End: 2019-08-02

## 2019-08-02 ENCOUNTER — HOSPITAL ENCOUNTER (OUTPATIENT)
Facility: AMBULATORY SURGERY CENTER | Age: 64
Discharge: HOME | End: 2019-08-02
Attending: INTERNAL MEDICINE | Admitting: INTERNAL MEDICINE
Payer: MEDICARE

## 2019-08-02 ENCOUNTER — ANESTHESIA (OUTPATIENT)
Dept: SURGERY | Facility: AMBULATORY SURGERY CENTER | Age: 64
End: 2019-08-02
Payer: MEDICARE

## 2019-08-02 VITALS — HEART RATE: 82 BPM

## 2019-08-02 VITALS
RESPIRATION RATE: 16 BRPM | OXYGEN SATURATION: 97 % | TEMPERATURE: 98.6 F | SYSTOLIC BLOOD PRESSURE: 120 MMHG | DIASTOLIC BLOOD PRESSURE: 72 MMHG

## 2019-08-02 LAB
COLONOSCOPY: NORMAL
GLUCOSE BLDC GLUCOMTR-MCNC: 80 MG/DL (ref 70–99)
GLUCOSE BLDC GLUCOMTR-MCNC: 88 MG/DL (ref 70–99)
GLUCOSE BLDC GLUCOMTR-MCNC: 99 MG/DL (ref 70–99)

## 2019-08-02 PROCEDURE — G8918 PT W/O PREOP ORDER IV AB PRO: HCPCS

## 2019-08-02 PROCEDURE — 44394 COLONOSCOPY W/SNARE: CPT

## 2019-08-02 PROCEDURE — G8907 PT DOC NO EVENTS ON DISCHARG: HCPCS

## 2019-08-02 PROCEDURE — 46608 ANOSCOPY REMOVE FOR BODY: CPT

## 2019-08-02 RX ORDER — LIDOCAINE HYDROCHLORIDE 20 MG/ML
INJECTION, SOLUTION INFILTRATION; PERINEURAL PRN
Status: DISCONTINUED | OUTPATIENT
Start: 2019-08-02 | End: 2019-08-02

## 2019-08-02 RX ORDER — LIDOCAINE 40 MG/G
CREAM TOPICAL
Status: DISCONTINUED | OUTPATIENT
Start: 2019-08-02 | End: 2019-08-03 | Stop reason: HOSPADM

## 2019-08-02 RX ORDER — SODIUM CHLORIDE, SODIUM LACTATE, POTASSIUM CHLORIDE, CALCIUM CHLORIDE 600; 310; 30; 20 MG/100ML; MG/100ML; MG/100ML; MG/100ML
INJECTION, SOLUTION INTRAVENOUS CONTINUOUS
Status: DISCONTINUED | OUTPATIENT
Start: 2019-08-02 | End: 2019-08-03 | Stop reason: HOSPADM

## 2019-08-02 RX ORDER — ONDANSETRON 2 MG/ML
4 INJECTION INTRAMUSCULAR; INTRAVENOUS
Status: DISCONTINUED | OUTPATIENT
Start: 2019-08-02 | End: 2019-08-03 | Stop reason: HOSPADM

## 2019-08-02 RX ORDER — PROPOFOL 10 MG/ML
INJECTION, EMULSION INTRAVENOUS PRN
Status: DISCONTINUED | OUTPATIENT
Start: 2019-08-02 | End: 2019-08-02

## 2019-08-02 RX ADMIN — LIDOCAINE HYDROCHLORIDE 40 MG: 20 INJECTION, SOLUTION INFILTRATION; PERINEURAL at 13:46

## 2019-08-02 RX ADMIN — PROPOFOL 30 MG: 10 INJECTION, EMULSION INTRAVENOUS at 13:57

## 2019-08-02 RX ADMIN — PROPOFOL 20 MG: 10 INJECTION, EMULSION INTRAVENOUS at 13:51

## 2019-08-02 RX ADMIN — PROPOFOL 50 MG: 10 INJECTION, EMULSION INTRAVENOUS at 13:46

## 2019-08-02 RX ADMIN — SODIUM CHLORIDE, SODIUM LACTATE, POTASSIUM CHLORIDE, CALCIUM CHLORIDE: 600; 310; 30; 20 INJECTION, SOLUTION INTRAVENOUS at 12:44

## 2019-08-02 RX ADMIN — PROPOFOL 30 MG: 10 INJECTION, EMULSION INTRAVENOUS at 14:01

## 2019-08-02 RX ADMIN — PROPOFOL 30 MG: 10 INJECTION, EMULSION INTRAVENOUS at 14:05

## 2019-08-02 RX ADMIN — PROPOFOL 30 MG: 10 INJECTION, EMULSION INTRAVENOUS at 13:49

## 2019-08-02 RX ADMIN — PROPOFOL 30 MG: 10 INJECTION, EMULSION INTRAVENOUS at 13:55

## 2019-08-02 RX ADMIN — PROPOFOL 50 MG: 10 INJECTION, EMULSION INTRAVENOUS at 14:07

## 2019-08-02 RX ADMIN — PROPOFOL 30 MG: 10 INJECTION, EMULSION INTRAVENOUS at 13:53

## 2019-08-02 NOTE — ANESTHESIA PREPROCEDURE EVALUATION
Anesthesia Pre-Procedure Evaluation    Patient: Holly Muir   MRN:     [id number] Gender:   female   Age:    64 year old :      1955        Preoperative Diagnosis: colonoscopy/S/P partial colectomy [Z90.49];Anal stenosis [K62.4];Special screening for malignant neoplasms, colon /bmi 36.88/Dr King/[facility] [phone]   Procedure(s):  COLONOSCOPY, WITH CO2 INSUFFLATION     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      Past Surgical History:   Procedure Laterality Date     ARTHROSCOPY KNEE RT/LT      LT     BREAST LUMPECTOMY, RT/LT      LT-benign      C EXCIS PTERYGIUM  10/08    [facility]     C STOMACH SURGERY PROCEDURE UNLISTED       COLONOSCOPY  5/10/2012    Procedure:COLONOSCOPY; screening colonoscopy; Surgeon:MILLA VEGA; [facility]     COLOSTOMY       COLOSTOMY       HC COLONOSCOPY THRU STOMA, DIAGNOSTIC      normal per report-no records available-records destroyed     HYSTERECTOMY, PAP NO LONGER INDICATED      done in [place]-cervical cancer     IMPLANT STIMULATOR SACRAL NERVE STAGE ONE Right 3/10/2015    Procedure: IMPLANT STIMULATOR SACRAL NERVE STAGE ONE;  Surgeon: Teodora Yusuf MD;  [facility]     IMPLANT STIMULATOR SACRAL NERVE STAGE TWO Right 3/31/2015    Procedure: IMPLANT STIMULATOR SACRAL NERVE STAGE TWO;  Surgeon: Teodora Yusuf MD;  [facility]     KNEE SURGERY       SALPINGO OOPHORECTOMY,R/L/JENNIFER      Salpingo Oophorectomy, RT/LT/JENNIFER          Anesthesia Evaluation     .             ROS/MED HX    ENT/Pulmonary:  - neg pulmonary ROS     Neurologic:  - neg neurologic ROS     Cardiovascular:  - neg  cardiovascular ROS   (+) Dyslipidemia, hypertension----. : . . . :. .       METS/Exercise Tolerance:     Hematologic:  - neg hematologic  ROS       Musculoskeletal:  - neg musculoskeletal ROS (+) arthritis,  -       GI/Hepatic:  - neg GI/hepatic ROS   (+) GERD       Renal/Genitourinary: Comment: Diabetic nephropathy - ROS Renal section negative       Endo:  - neg endo ROS   (+) type II DM Obesity, .      Psychiatric:  - neg psychiatric ROS       Infectious Disease:  - neg infectious disease ROS       Malignancy:   (+) Malignancy History of Other  Other CA cervical ca Remission status post      - no malignancy   Other:    - neg other ROS                     PHYSICAL EXAM:   Mental Status/Neuro: A/A/O   Airway: Facies: Feasible  Mallampati: II  Mouth/Opening: Full  TM distance: > 6 cm  Neck ROM: Full   Respiratory: Auscultation: CTAB     Resp. Rate: Normal     Resp. Effort: Normal      CV: Rhythm: Regular  Rate: Age appropriate  Heart: Normal Sounds  Edema: None   Comments:      Dental: Normal Dentition                LABS:  CBC:   Lab Results   Component Value Date    WBC 5.7 07/29/2019    WBC 6.2 10/13/2011    HGB 11.8 07/29/2019    HGB 13.5 10/13/2011    HCT 36.9 07/29/2019    HCT 40.3 10/13/2011     07/29/2019     10/13/2011     BMP:   Lab Results   Component Value Date     07/29/2019     (L) 08/08/2018    POTASSIUM 3.9 07/29/2019    POTASSIUM 3.9 08/08/2018    CHLORIDE 103 07/29/2019    CHLORIDE 98 08/08/2018    CO2 29 07/29/2019    CO2 27 08/08/2018    BUN 12 07/29/2019    BUN 13 08/08/2018    CR 0.72 07/29/2019    CR 0.74 08/08/2018     (H) 07/29/2019     (H) 08/08/2018     COAGS: No results found for: PTT, INR, FIBR  POC:   Lab Results   Component Value Date    BGM 99 08/02/2019     OTHER:   Lab Results   Component Value Date    A1C 8.3 (H) 07/29/2019    NICOLÁS 9.3 07/29/2019    PHOS 4.8 (H) 02/03/2015    ALBUMIN 3.6 12/19/2016    PROTTOTAL 7.6 12/19/2016    ALT 58 (H)  "12/19/2016    AST 29 12/19/2016    ALKPHOS 101 12/19/2016    BILITOTAL 0.3 12/19/2016    TSH 5.29 (H) 08/08/2018    T4 1.08 08/08/2018        Preop Vitals    BP Readings from Last 3 Encounters:   08/02/19 127/70   07/29/19 110/50   07/19/19 104/64    Pulse Readings from Last 3 Encounters:   07/29/19 92   07/19/19 67   06/27/19 67      Resp Readings from Last 3 Encounters:   08/02/19 22   05/25/16 18   10/01/15 12    SpO2 Readings from Last 3 Encounters:   08/02/19 100%   07/29/19 97%   06/27/19 97%      Temp Readings from Last 1 Encounters:   08/02/19 36.8  C (98.3  F) (Temporal)    Ht Readings from Last 1 Encounters:   07/29/19 1.581 m (5' 2.25\")      Wt Readings from Last 1 Encounters:   07/29/19 91.6 kg (201 lb 14.4 oz)    Estimated body mass index is 36.63 kg/m  as calculated from the following:    Height as of 7/29/19: 1.581 m (5' 2.25\").    Weight as of 7/29/19: 91.6 kg (201 lb 14.4 oz).     LDA:  Peripheral IV 03/10/15 Right Hand (Active)   Number of days: 1606       Peripheral IV 08/02/19 Right Hand (Active)   Site Assessment WDL 8/2/2019 12:41 PM   Phlebitis Scale 0-->no symptoms 8/2/2019 12:41 PM   Infiltration Site Treatment Method  Warm compress 8/2/2019 12:41 PM   Dressing Intervention New dressing  8/2/2019 12:41 PM   Number of days: 0        Assessment:   ASA SCORE: 3    H&P: History and physical reviewed and following examination; no interval change.   Smoking Status:  Non-Smoker/Unknown   NPO Status: NPO Appropriate     Plan:   Anes. Type:  MAC   Pre-Medication: None   Induction:  N/a   Airway: Native Airway   Access/Monitoring: PIV   Maintenance: N/a     Postop Plan:   Postop Pain: None  Postop Sedation/Airway: Not planned  Disposition: Outpatient     PONV Management:   Adult Risk Factors: Female, Non-Smoker   Prevention: Ondansetron, Propofol     CONSENT: Direct conversation   Plan and risks discussed with: Patient   Blood Products: Consent Deferred (Minimal Blood Loss)                   Nemesio" Edil Lewis MD

## 2019-08-02 NOTE — ANESTHESIA POSTPROCEDURE EVALUATION
Anesthesia POST Procedure Evaluation    Patient: Holly Muir   MRN:     [id number] Gender:   female   Age:    64 year old :      1955        Preoperative Diagnosis: colonoscopy/S/P partial colectomy [Z90.49];Anal stenosis [K62.4];Special screening for malignant neoplasms, colon /bmi 36.88/Dr King/[facility] [phone]   Procedure(s):  COLONOSCOPY, THROUGH STOMA, USING SNARE  Colonoscopy, Flexible, With Lesion Removal Using Snare   Postop Comments: No value filed.       Anesthesia Type:  Not documented  MAC    Reportable Event: NO     PAIN: Uncomplicated   Sign Out status: Comfortable, Well controlled pain     PONV: No PONV   Sign Out status:  No Nausea or Vomiting     Neuro/Psych: Uneventful perioperative course   Sign Out Status: Preoperative baseline; Age appropriate mentation     Airway/Resp.: Uneventful perioperative course   Sign Out Status: Non labored breathing, age appropriate RR; Resp. Status within EXPECTED Parameters     CV: Uneventful perioperative course   Sign Out status: Appropriate BP and perfusion indices; Appropriate HR/Rhythm     Disposition:   Sign Out in:  PACU  Disposition:  Phase II; Home  Recovery Course: Uneventful  Follow-Up: Not required           Last Anesthesia Record Vitals:  CRNA VITALS  2019 1348 - 2019 1448      2019             Pulse:  82    SpO2:  99 %          Last PACU Vitals:  Vitals Value Taken Time   BP     Temp     Pulse 82 2019  2:15 PM   Resp     SpO2     Temp src     NIBP 110/56 2019  2:15 PM   Pulse 82 2019  2:16 PM   SpO2 99 % 2019  2:16 PM   Resp     Temp     Ht Rate 91 2019  2:11 PM   Temp 2           Electronically Signed By: Nemesio Lewis MD, 2019, 3:07 PM

## 2019-08-06 LAB — COPATH REPORT: NORMAL

## 2019-08-12 ENCOUNTER — OFFICE VISIT (OUTPATIENT)
Dept: OPHTHALMOLOGY | Facility: CLINIC | Age: 64
End: 2019-08-12
Attending: OPTOMETRIST
Payer: MEDICARE

## 2019-08-12 ENCOUNTER — TELEPHONE (OUTPATIENT)
Dept: OPHTHALMOLOGY | Facility: CLINIC | Age: 64
End: 2019-08-12

## 2019-08-12 DIAGNOSIS — H02.834 DERMATOCHALASIS OF BOTH UPPER EYELIDS: ICD-10-CM

## 2019-08-12 DIAGNOSIS — H02.831 DERMATOCHALASIS OF BOTH UPPER EYELIDS: ICD-10-CM

## 2019-08-12 DIAGNOSIS — H25.13 AGE-RELATED NUCLEAR CATARACT OF BOTH EYES: Primary | ICD-10-CM

## 2019-08-12 PROCEDURE — 92012 INTRM OPH EXAM EST PATIENT: CPT | Performed by: OPHTHALMOLOGY

## 2019-08-12 ASSESSMENT — SLIT LAMP EXAM - LIDS
COMMENTS: DERMATOCHALASIS - UPPER LID
COMMENTS: DERMATOCHALASIS - UPPER LID

## 2019-08-12 ASSESSMENT — REFRACTION_WEARINGRX
OS_AXIS: 180
OD_ADD: +2.50
OD_CYLINDER: +0.50
SPECS_TYPE: BIFOCAL
OS_SPHERE: +0.50
OD_SPHERE: +1.00
OS_CYLINDER: +1.00
OD_AXIS: 026

## 2019-08-12 ASSESSMENT — TONOMETRY
OD_IOP_MMHG: 15
OS_IOP_MMHG: 17
IOP_METHOD: TONOPEN

## 2019-08-12 ASSESSMENT — VISUAL ACUITY
CORRECTION_TYPE: GLASSES
METHOD: SNELLEN - LINEAR
OD_CC: 20/60
OS_CC: 20/60
OD_CC: 20/20
OS_CC: 20/25

## 2019-08-12 ASSESSMENT — EXTERNAL EXAM - LEFT EYE: OS_EXAM: NORMAL

## 2019-08-12 ASSESSMENT — CUP TO DISC RATIO
OD_RATIO: 0.2
OS_RATIO: 0.2

## 2019-08-12 ASSESSMENT — EXTERNAL EXAM - RIGHT EYE: OD_EXAM: NORMAL

## 2019-08-12 NOTE — TELEPHONE ENCOUNTER
Date Scheduled: 9-12 and 9-26  Facility: [facility]  Surgeon: Dr. Winn   Post-op appointment scheduled:   Next 5 appointments (look out 90 days)    Sep 13, 2019 10:20 AM CDT  [facility] Ariel Greene OD  [facility] [id number] [place] [address]  [phone]   Sep 20, 2019  9:30 AM CDT  Return Visit with Elizabeth Medrano MD, [facility] NURSE  [facility]) [address] [place] [address]  [phone]   Sep 20, 2019 11:00 AM CDT  [facility] Visit with Ariel Greene OD  [facility] [address] [place] [address]  [phone]   Sep 27, 2019 11:40 AM CDT  Return Visit with Ariel Greene OD  [facility] ([facility]) [address] [place] [address]  [phone]   Oct 04, 2019 11:20 AM CDT  Return Visit with Ariel Greene OD  [facility]) [id number] [place] [address]  [phone]           scheduled?: No  Surgery packet/instructions confirmed received?  Yes  Special Considerations:   Zenaida Umanzor, Surgery Scheduling Coordinator

## 2019-08-12 NOTE — TELEPHONE ENCOUNTER
"Procedure: left Cataract  Facility: [facility]  Length: 10.0 minute(s)  Surgeon:Dwayne Winn MD  Anesthesia: Local with MAC  Diagnosis: Cataract  Out Patient or AM admit:  (Same day)  BMI:Estimated body mass index is 36.63 kg/m  as calculated from the following:    Height as of 7/29/19: 1.581 m (5' 2.25\").    Weight as of 7/29/19: 91.6 kg (201 lb 14.4 oz). (If over 43 for general or 45 for MAC cannot be scheduled at [facility])   Pre-op Appointments needed: History & Physical within 30 days of surgery  Post-op appointments needed: Cataract 1 day 1 week   needed:No   Surgery packet/instructions given to patient?:  Yes  Pre op teaching done:  Yes  Lens Orders Needed: Yes: ZCB00 100   Referring provider:   Special Considerations:     Procedure: right Cataract  Facility: [facility]  Length: 10.0 minute(s)  Surgeon:Dwayne Winn MD  Anesthesia: Local with MAC  Diagnosis: Cataract  Out Patient or AM admit:  (Same day)  BMI:Estimated body mass index is 36.63 kg/m  as calculated from the following:    Height as of 7/29/19: 1.581 m (5' 2.25\").    Weight as of 7/29/19: 91.6 kg (201 lb 14.4 oz). (If over 43 for general or 45 for MAC cannot be scheduled at [facility])   Pre-op Appointments needed: History & Physical within 30 days of surgery  Post-op appointments needed: Cataract 1 day 1 week 4 weeks   needed:No   Surgery packet/instructions given to patient?:  Yes  Pre op teaching done:  Yes  Lens Orders Needed: Yes: ZCB00 90   Referring provider:   Special Considerations:           "

## 2019-08-12 NOTE — PROGRESS NOTES
Assessment & Plan   Holly Muir is a 64 year old female who presents with:   Review of systems for the eyes was negative other than the pertinent positives and negatives noted in the HPI.    Age-related nuclear cataract of both eyes  - A+K's  - Plan CE/PCIOL each eye, standard distance each eye, left eye first    Dermatochalasis of both upper eyelids  - Surgery scheduled with Dr. Randolph BALL this week      Attending Physician Attestation:  Complete documentation of historical and exam elements from today's encounter can be found in the full encounter summary report (not reduplicated in this progress note).  I personally obtained the chief complaint(s) and history of present illness.  I confirmed and edited as necessary the review of systems, past medical/surgical history, family history, social history, and examination findings as documented by others; and I examined the patient myself.  I personally reviewed the relevant tests, images, and reports as documented above.  I formulated and edited as necessary the assessment and plan and discussed the findings and management plan with the patient and family. - Francesco Winn MD

## 2019-08-12 NOTE — NURSING NOTE
Patient presents with:  Cataract:  Here at the of Dr. Greene for Cataract evaluation. Decrease in VA right eye>OS      [place]erring Provider:  [place]  [address] [place] [address]        Roseline Pascal COT

## 2019-08-13 ENCOUNTER — TELEPHONE (OUTPATIENT)
Dept: PEDIATRICS | Facility: CLINIC | Age: 64
End: 2019-08-13

## 2019-08-13 DIAGNOSIS — F33.3 SEVERE RECURRENT MAJOR DEPRESSIVE DISORDER WITH PSYCHOTIC FEATURES (H): ICD-10-CM

## 2019-08-13 DIAGNOSIS — R44.1 VISUAL HALLUCINATION: ICD-10-CM

## 2019-08-13 RX ORDER — ARIPIPRAZOLE 30 MG/1
30 TABLET ORAL DAILY
OUTPATIENT
Start: 2019-08-13

## 2019-08-13 NOTE — TELEPHONE ENCOUNTER
This medication should come from her psychiatrist. Please verify dose. Our record indicated 20 mg. The refill came as 30 mg. We'll need to update the chart if her psychiatrist has changed the dose.

## 2019-08-13 NOTE — TELEPHONE ENCOUNTER
[facility] RN Meredith calls for once a week nursing orders- gave orders.  Abilify should be 30mg per [place], pended as historical.  Asya Howell RN

## 2019-08-14 RX ORDER — ARIPIPRAZOLE 30 MG/1
30 TABLET ORAL DAILY
COMMUNITY
Start: 2019-08-14 | End: 2022-12-29

## 2019-08-14 NOTE — TELEPHONE ENCOUNTER
"VASCULAR SURGERY CLINIC CONSULTATION   Vascular surgeon: Allyn Walters MD  Date of Service: 3/1/2018    Pranav Jackson   Medical Record #:  [id number]  YOB: 1933  Age:  84 year old   PCP: Beto Rahman    Reason for visit:  Carotid artery stenosis    HPI:  Pranav Jackson is a 84 year old year old male who has a PMH significant for Diabetic on trulicity.  HTN. ASPVD. Pt was referred from Dr. Ibarra of colorectal surgery for evaluation of carotid artery stenosis. Pt is asymptomatic and has never had a stroke, TIA, limb or facial droop, monocular vision changes, or loss of speech. Pt does have a history of vascular intervention in [place] by Dr. Mitchell (sp?) around 16 years ago. He was supposed to have what sounds like and aorto-bifem bypass, but  was told that his aorta is \"like a rock\" and he was unable to have an abdominal bypass so he underwent a femoral to femoral bypass. Pt feels his bypass is still intact. He is able to move well without problems. Pt has not noted any LE edema, redness, or problems. As noted below, pt's family history is significant for mother having strokes. The patient recently underwent evaluation for orthopedic surgery and had an exercise stress test within the last 3 months.  This apparently was normal.     Review of Systems:  General: Denies F/C  Respiratory: Denies SOB  Cardiovascular: Denies CP  Gastrointestinal: Denies N/V  Musculoskeletal: Denies LE pain  : denies changes to bladder habits  Neurologic: Denies HA  Psychiatric: Denies confusion  Skin: no concerning lesions  Hematology/immunology: no unexpected bruising    Past medical History:  Diabetic on trulicity.  HTN. ASPVD    Past Surgical History:   Orthopedic surgeries and femoral bypass.  No prior history of anesthesia complications.    Family History:  No siblings. Mother  of cancer and strokes. Dad  of cancer    SOCIAL HISTORY:   Social History   Substance Use Topics     Smoking status: Former " Called patient and verified her psychiatrist prescribed Abilify 30mg and it has been going well and denies side effects. Addended JOSE Howell RN    Smoker     Smokeless tobacco: Never Used     Alcohol use Yes     Home medications:  Prior to Admission medications    Medication Sig Start Date End Date Taking? Authorizing Provider   aspirin 325 MG tablet Take 325 mg by mouth 4/10/17   Reported, Patient   Multiple Vitamins-Minerals (MULTIVITAMIN ADULT PO)     Reported, Patient   gabapentin (NEURONTIN) 300 MG capsule Take 300 mg by mouth 8/12/16   Reported, Patient   omeprazole (PRILOSEC) 20 MG CR capsule Take 20 mg by mouth 6/27/16   Reported, Patient   lisinopril-hydrochlorothiazide (PRINZIDE/ZESTORETIC) 20-12.5 MG per tablet TAKE ONE TABLET BY MOUTH ONCE DAILY 9/9/15   Reported, Patient   metFORMIN (GLUCOPHAGE) 1000 MG tablet Take 1,000 mg by mouth 12/16/15   Reported, Patient   simvastatin (ZOCOR) 20 MG tablet Take 20 mg by mouth 7/27/16   Reported, Patient   citalopram (CELEXA) 40 MG tablet Take 40 mg by mouth 7/13/16   Reported, Patient   Ferrous Sulfate (IRON SUPPLEMENT PO)     Reported, Patient   Dulaglutide (TRULICITY SC)     Reported, Patient     Vital signs:  /58 (BP Location: Right arm)  Pulse 57  Resp 16  SpO2 97%    0 lbs 0 oz    Physical exam:  Constitutional: healthy, alert, no acute distress and cooperative   Cardiovascular: RRR  Respiratory: breathing unlabored without secondary muscle use  Psychiatric: mentation appears normal and affect normal/bright  Neck: no asymmetry  GI/Abdomen: abdomen soft, non-tender. No palpable masses  MSK: able to move all extremities without weakness or ataxia  Extremities: no open lesions, extremities warm and well perfused  Hematology: no bruising on visible skin  Pulses: right common femoral: 1+ Left common femoral 0+, bilateral DP: 0+, Bilateral PT: 0+    Imaging:    Right Carotid      Left carotid    I personally reviewed the images and my interpretation is bilateral carotid artery stenosis    Assessment:  Bilateral carotid artery stenosis with plaques on both sides. Plaque on the right is soft, plaque on  the left is calcified and irregular.     Plan:  -Continue Aspirin, Simvastatin  -Could consider a carotid endarterectomy, but would likely be best to do electively once pt has fully recovered from his upcoming 3/21/18 colectomy with Dr. Ibarra  -F/U in clinic with Dr. Walters in 3 months to discuss possible procedure with a carotid duplex      TOBACCO CESSATION: Pt quit smoking ~20 years ago      Alice Luevano PA-C   Division of Vascular Surgery  [facility]

## 2019-08-15 ENCOUNTER — ANESTHESIA EVENT (OUTPATIENT)
Dept: SURGERY | Facility: CLINIC | Age: 64
End: 2019-08-15
Payer: MEDICARE

## 2019-08-15 DIAGNOSIS — Z53.9 DIAGNOSIS NOT YET DEFINED: Primary | ICD-10-CM

## 2019-08-15 PROCEDURE — G0179 MD RECERTIFICATION HHA PT: HCPCS | Performed by: INTERNAL MEDICINE

## 2019-08-16 ENCOUNTER — HOSPITAL ENCOUNTER (OUTPATIENT)
Facility: CLINIC | Age: 64
Discharge: HOME | End: 2019-08-16
Attending: OPHTHALMOLOGY | Admitting: OPHTHALMOLOGY
Payer: MEDICARE

## 2019-08-16 ENCOUNTER — ANESTHESIA (OUTPATIENT)
Dept: SURGERY | Facility: CLINIC | Age: 64
End: 2019-08-16
Payer: MEDICARE

## 2019-08-16 VITALS
WEIGHT: 208 LBS | BODY MASS INDEX: 38.28 KG/M2 | TEMPERATURE: 97.1 F | HEIGHT: 62 IN | RESPIRATION RATE: 16 BRPM | OXYGEN SATURATION: 98 % | DIASTOLIC BLOOD PRESSURE: 82 MMHG | SYSTOLIC BLOOD PRESSURE: 140 MMHG | HEART RATE: 80 BPM

## 2019-08-16 DIAGNOSIS — Z98.890 POSTOPERATIVE STATE: Primary | ICD-10-CM

## 2019-08-16 LAB
GLUCOSE BLDC GLUCOMTR-MCNC: 157 MG/DL (ref 70–99)
GLUCOSE BLDC GLUCOMTR-MCNC: 182 MG/DL (ref 70–99)

## 2019-08-16 PROCEDURE — 71000012 ZZH RECOVERY PHASE 1 LEVEL 1 FIRST HR: Performed by: OPHTHALMOLOGY

## 2019-08-16 PROCEDURE — 37000009 ZZH ANESTHESIA TECHNICAL FEE, EACH ADDTL 15 MIN: Performed by: OPHTHALMOLOGY

## 2019-08-16 PROCEDURE — 25000125 ZZHC RX 250: Performed by: NURSE ANESTHETIST, CERTIFIED REGISTERED

## 2019-08-16 PROCEDURE — 71000027 ZZH RECOVERY PHASE 2 EACH 15 MINS: Performed by: OPHTHALMOLOGY

## 2019-08-16 PROCEDURE — 27210794 ZZH OR GENERAL SUPPLY STERILE: Performed by: OPHTHALMOLOGY

## 2019-08-16 PROCEDURE — 37000008 ZZH ANESTHESIA TECHNICAL FEE, 1ST 30 MIN: Performed by: OPHTHALMOLOGY

## 2019-08-16 PROCEDURE — 25000128 H RX IP 250 OP 636: Performed by: OPHTHALMOLOGY

## 2019-08-16 PROCEDURE — 36000056 ZZH SURGERY LEVEL 3 1ST 30 MIN: Performed by: OPHTHALMOLOGY

## 2019-08-16 PROCEDURE — 25800030 ZZH RX IP 258 OP 636: Performed by: NURSE ANESTHETIST, CERTIFIED REGISTERED

## 2019-08-16 PROCEDURE — 82962 GLUCOSE BLOOD TEST: CPT

## 2019-08-16 PROCEDURE — 40000169 ZZH STATISTIC PRE-PROCEDURE ASSESSMENT I: Performed by: OPHTHALMOLOGY

## 2019-08-16 PROCEDURE — 25000128 H RX IP 250 OP 636: Performed by: NURSE ANESTHETIST, CERTIFIED REGISTERED

## 2019-08-16 PROCEDURE — 25000125 ZZHC RX 250: Performed by: OPHTHALMOLOGY

## 2019-08-16 PROCEDURE — 36000058 ZZH SURGERY LEVEL 3 EA 15 ADDTL MIN: Performed by: OPHTHALMOLOGY

## 2019-08-16 RX ORDER — MEPERIDINE HYDROCHLORIDE 25 MG/ML
12.5 INJECTION INTRAMUSCULAR; INTRAVENOUS; SUBCUTANEOUS
Status: DISCONTINUED | OUTPATIENT
Start: 2019-08-16 | End: 2019-08-16 | Stop reason: HOSPADM

## 2019-08-16 RX ORDER — ONDANSETRON 2 MG/ML
INJECTION INTRAMUSCULAR; INTRAVENOUS PRN
Status: DISCONTINUED | OUTPATIENT
Start: 2019-08-16 | End: 2019-08-16

## 2019-08-16 RX ORDER — ONDANSETRON 2 MG/ML
4 INJECTION INTRAMUSCULAR; INTRAVENOUS EVERY 30 MIN PRN
Status: DISCONTINUED | OUTPATIENT
Start: 2019-08-16 | End: 2019-08-16 | Stop reason: HOSPADM

## 2019-08-16 RX ORDER — ERYTHROMYCIN 5 MG/G
0.5 OINTMENT OPHTHALMIC 3 TIMES DAILY
Qty: 3.5 G | Refills: 0 | Status: SHIPPED | OUTPATIENT
Start: 2019-08-16 | End: 2019-09-20

## 2019-08-16 RX ORDER — NALOXONE HYDROCHLORIDE 0.4 MG/ML
.1-.4 INJECTION, SOLUTION INTRAMUSCULAR; INTRAVENOUS; SUBCUTANEOUS
Status: DISCONTINUED | OUTPATIENT
Start: 2019-08-16 | End: 2019-08-16 | Stop reason: HOSPADM

## 2019-08-16 RX ORDER — PROPOFOL 10 MG/ML
INJECTION, EMULSION INTRAVENOUS PRN
Status: DISCONTINUED | OUTPATIENT
Start: 2019-08-16 | End: 2019-08-16

## 2019-08-16 RX ORDER — FENTANYL CITRATE 50 UG/ML
25-50 INJECTION, SOLUTION INTRAMUSCULAR; INTRAVENOUS
Status: DISCONTINUED | OUTPATIENT
Start: 2019-08-16 | End: 2019-08-16 | Stop reason: HOSPADM

## 2019-08-16 RX ORDER — SODIUM CHLORIDE, SODIUM LACTATE, POTASSIUM CHLORIDE, CALCIUM CHLORIDE 600; 310; 30; 20 MG/100ML; MG/100ML; MG/100ML; MG/100ML
INJECTION, SOLUTION INTRAVENOUS CONTINUOUS PRN
Status: DISCONTINUED | OUTPATIENT
Start: 2019-08-16 | End: 2019-08-16

## 2019-08-16 RX ORDER — TETRACAINE HYDROCHLORIDE 5 MG/ML
SOLUTION OPHTHALMIC PRN
Status: DISCONTINUED | OUTPATIENT
Start: 2019-08-16 | End: 2019-08-16 | Stop reason: HOSPADM

## 2019-08-16 RX ORDER — LIDOCAINE HYDROCHLORIDE 20 MG/ML
INJECTION, SOLUTION INFILTRATION; PERINEURAL PRN
Status: DISCONTINUED | OUTPATIENT
Start: 2019-08-16 | End: 2019-08-16

## 2019-08-16 RX ORDER — HYDROMORPHONE HYDROCHLORIDE 1 MG/ML
.3-.5 INJECTION, SOLUTION INTRAMUSCULAR; INTRAVENOUS; SUBCUTANEOUS EVERY 10 MIN PRN
Status: DISCONTINUED | OUTPATIENT
Start: 2019-08-16 | End: 2019-08-16 | Stop reason: HOSPADM

## 2019-08-16 RX ORDER — ONDANSETRON 4 MG/1
4 TABLET, ORALLY DISINTEGRATING ORAL EVERY 30 MIN PRN
Status: DISCONTINUED | OUTPATIENT
Start: 2019-08-16 | End: 2019-08-16 | Stop reason: HOSPADM

## 2019-08-16 RX ORDER — ERYTHROMYCIN 5 MG/G
OINTMENT OPHTHALMIC PRN
Status: DISCONTINUED | OUTPATIENT
Start: 2019-08-16 | End: 2019-08-16 | Stop reason: HOSPADM

## 2019-08-16 RX ORDER — FENTANYL CITRATE 50 UG/ML
INJECTION, SOLUTION INTRAMUSCULAR; INTRAVENOUS PRN
Status: DISCONTINUED | OUTPATIENT
Start: 2019-08-16 | End: 2019-08-16

## 2019-08-16 RX ORDER — HYDROCODONE BITARTRATE AND ACETAMINOPHEN 5; 325 MG/1; MG/1
1 TABLET ORAL ONCE
Status: DISCONTINUED | OUTPATIENT
Start: 2019-08-16 | End: 2019-08-16 | Stop reason: CLARIF

## 2019-08-16 RX ORDER — SODIUM CHLORIDE, SODIUM LACTATE, POTASSIUM CHLORIDE, CALCIUM CHLORIDE 600; 310; 30; 20 MG/100ML; MG/100ML; MG/100ML; MG/100ML
INJECTION, SOLUTION INTRAVENOUS CONTINUOUS
Status: DISCONTINUED | OUTPATIENT
Start: 2019-08-16 | End: 2019-08-16 | Stop reason: HOSPADM

## 2019-08-16 RX ADMIN — FENTANYL CITRATE 50 MCG: 50 INJECTION, SOLUTION INTRAMUSCULAR; INTRAVENOUS at 11:21

## 2019-08-16 RX ADMIN — PROPOFOL 20 MG: 10 INJECTION, EMULSION INTRAVENOUS at 11:31

## 2019-08-16 RX ADMIN — ONDANSETRON 4 MG: 2 INJECTION INTRAMUSCULAR; INTRAVENOUS at 11:26

## 2019-08-16 RX ADMIN — SODIUM CHLORIDE, POTASSIUM CHLORIDE, SODIUM LACTATE AND CALCIUM CHLORIDE: 600; 310; 30; 20 INJECTION, SOLUTION INTRAVENOUS at 11:20

## 2019-08-16 RX ADMIN — PROPOFOL 10 MG: 10 INJECTION, EMULSION INTRAVENOUS at 11:32

## 2019-08-16 RX ADMIN — LIDOCAINE HYDROCHLORIDE 60 MG: 20 INJECTION, SOLUTION INFILTRATION; PERINEURAL at 11:31

## 2019-08-16 RX ADMIN — MIDAZOLAM 1 MG: 1 INJECTION INTRAMUSCULAR; INTRAVENOUS at 11:21

## 2019-08-16 ASSESSMENT — LIFESTYLE VARIABLES: TOBACCO_USE: 1

## 2019-08-16 ASSESSMENT — MIFFLIN-ST. JEOR: SCORE: 1451.73

## 2019-08-16 NOTE — OR NURSING
[employer] CONTACTED FOR RECOMMENDATIONS FOR NERVE STIMULATOR DURING SURGERY. FIRST CHOICE WOULD BE TO TURN STIMULATOR OFF.  SECOND CHOICE WOULD BE TO USE BIPOLAR CAUTERY.

## 2019-08-16 NOTE — ANESTHESIA CARE TRANSFER NOTE
Patient: Holly Muir    Procedure(s):  BILATERAL UPPER LID BLEPHAROPLASTY    Diagnosis: BILATERAL DERMATOCHALSIS  Diagnosis Additional Information: No value filed.    Anesthesia Type:   No value filed.     Note:  Airway :Room Air  Patient transferred to:PACU  Comments: VSS on Room Air. Talking and sitting up in chair. Report given to RN before transfer of pt care.Handoff Report: Identifed the Patient, Identified the Reponsible Provider, Reviewed the pertinent medical history, Discussed the surgical course, Reviewed Intra-OP anesthesia mangement and issues during anesthesia, Set expectations for post-procedure period and Allowed opportunity for questions and acknowledgement of understanding      Vitals: (Last set prior to Anesthesia Care Transfer)    CRNA VITALS  8/16/2019 1129 - 8/16/2019 1203      8/16/2019             Resp Rate (set):  10                Electronically Signed By: KASIE Izquierdo CRNA  August 16, 2019  12:03 PM

## 2019-08-16 NOTE — OR NURSING
PT offered stimulator remote control.  Pt states she doesn't know how to stimulator on/off.  Spoke with Dr Gonzalez .  Ok to leave stimulator on during surgery.

## 2019-08-16 NOTE — ANESTHESIA PREPROCEDURE EVALUATION
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     Past Surgical History:   Procedure Laterality Date     ARTHROSCOPY KNEE RT/LT      LT     BREAST LUMPECTOMY, RT/LT      LT-benign      C EXCIS PTERYGIUM  10/08    [facility]     C STOMACH SURGERY PROCEDURE UNLISTED       COLONOSCOPY  5/10/2012    Procedure:COLONOSCOPY; screening colonoscopy; Surgeon:MILLA VEGA; [facility]     COLONOSCOPY WITH CO2 INSUFFLATION N/A 2019    Procedure: Colonoscopy with CO2 insufflation;  Surgeon: Himanshu Hi MD;  Location: [facility]     COLOSTOMY       COLOSTOMY       HC COLONOSCOPY THRU STOMA, DIAGNOSTIC      normal per report-no records available-records destroyed     HYSTERECTOMY, PAP NO LONGER INDICATED      done in [place]-cervical cancer     IMPLANT STIMULATOR SACRAL NERVE STAGE ONE Right 3/10/2015    Procedure: IMPLANT STIMULATOR SACRAL NERVE STAGE ONE;  Surgeon: Teodora Yusuf MD;  [facility]     IMPLANT STIMULATOR SACRAL NERVE STAGE TWO Right 3/31/2015    Procedure: IMPLANT STIMULATOR SACRAL NERVE STAGE TWO;  Surgeon: Teodora Yusuf MD;  [facility]     KNEE SURGERY       SALPINGO OOPHORECTOMY,R/L/JENNIFER      Salpingo Oophorectomy, RT/LT/JENNIFER       Anesthesia Evaluation     .             ROS/MED HX    ENT/Pulmonary:     (+)TAMI risk factors hypertension, obese, allergic rhinitis, tobacco use, Past use , . .    Neurologic:      "  Cardiovascular:     (+) Dyslipidemia, hypertension----. : . . . :. .       METS/Exercise Tolerance:     Hematologic:         Musculoskeletal:   (+) arthritis,  -       GI/Hepatic:     (+) GERD Asymptomatic on medication,       Renal/Genitourinary:         Endo:     (+) type II DM Using insulin Diabetic complications: neuropathy, Obesity, .      Psychiatric:     (+) psychiatric history schizophrenia      Infectious Disease:         Malignancy:   (+) Malignancy History of Other  Other CA cervical cancer in remission Remission status post         Other: Comment: Chronic pain in legs, hands; on gabapentin/tramadol   (+) H/O Chronic Pain,                        Physical Exam  Normal systems: cardiovascular, pulmonary and dental    Airway   Mallampati: II  TM distance: >3 FB  Neck ROM: full    Dental     Cardiovascular       Pulmonary             Lab Results   Component Value Date    WBC 5.7 07/29/2019    HGB 11.8 07/29/2019    HCT 36.9 07/29/2019     07/29/2019     07/29/2019    POTASSIUM 3.9 07/29/2019    CHLORIDE 103 07/29/2019    CO2 29 07/29/2019    BUN 12 07/29/2019    CR 0.72 07/29/2019     (H) 07/29/2019    NICOLÁS 9.3 07/29/2019    PHOS 4.8 (H) 02/03/2015    ALBUMIN 3.6 12/19/2016    PROTTOTAL 7.6 12/19/2016    ALT 58 (H) 12/19/2016    AST 29 12/19/2016    ALKPHOS 101 12/19/2016    BILITOTAL 0.3 12/19/2016    TSH 5.29 (H) 08/08/2018    T4 1.08 08/08/2018       Preop Vitals  BP Readings from Last 3 Encounters:   08/16/19 (!) 140/67   08/02/19 120/72   07/29/19 110/50    Pulse Readings from Last 3 Encounters:   08/02/19 82   07/29/19 92   07/19/19 67      Resp Readings from Last 3 Encounters:   08/16/19 18   08/02/19 16   05/25/16 18    SpO2 Readings from Last 3 Encounters:   08/16/19 97%   08/02/19 97%   07/29/19 97%      Temp Readings from Last 1 Encounters:   08/16/19 36.9  C (98.4  F) (Oral)    Ht Readings from Last 1 Encounters:   08/16/19 1.575 m (5' 2\")      Wt Readings from Last 1 " "Encounters:   08/16/19 94.3 kg (208 lb)    Estimated body mass index is 38.04 kg/m  as calculated from the following:    Height as of this encounter: 1.575 m (5' 2\").    Weight as of this encounter: 94.3 kg (208 lb).       Anesthesia Plan      History & Physical Review  History and physical reviewed and following examination; no interval change.    ASA Status:  3 .    NPO Status:  > 8 hours    Plan for MAC Reason for MAC:  Procedure to face, neck, head or breast  PONV prophylaxis:  Ondansetron (or other 5HT-3)       Postoperative Care  Postoperative pain management:  IV analgesics and Multi-modal analgesia.      Consents  Anesthetic plan, risks, benefits and alternatives discussed with:  Patient..                 Saqib Gonzalez,   "

## 2019-08-16 NOTE — DISCHARGE INSTRUCTIONS
Post-operative Instructions  Ophthalmic Plastic and Reconstructive Surgery    Randolph Cook M.D.     All instructions apply to the operated eye(s) or eyelid(s).    Wound care and personal care  ? If a patch or bandage has been placed, please leave this in place until seen by your physician. Ensure that the bandage does not get wet when you take a shower.  ? Apply ice compresses 15 minutes of every hour while awake for 2 days. As long as there is no further bleeding, after two days, switch to warm water compresses for five minutes, four times a day until seen by your physician. Instead of warm water compresses, you can use a cup of dry uncooked rice in a clean cotton sock. Place sock in microwave for 30 seconds to one minute. Next place the warm sock in a plastic bag, and place it over a clean warm wet washcloth over operated eye.    ? You may shower or wash your hair the day after surgery. Do not go swimming for at least 2 weeks to prevent contamination of your wounds.  ? Do not apply make-up to the eyes or eyelids for 2 weeks after surgery.  ? Expect some swelling, bruising, black eye (even into the lower eyelids and cheeks). Also expect serum caking, crusting and discharge from the eye and/or incisions. A small amount of surface bleeding, and depending on the type of surgery, bleeding from the inside of the eyelid, is normal for the first 48 hours.  ? Avoid straining, bending at the waist, or lifting more than 15 pounds for 10 days. Activities that raise your blood pressure can worsen swelling, cause bleeding, and breaking of sutures. Like wise, sleeping with your head slightly elevated for the first several days can help swelling resolve more quickly.   ? Do continue to ambulate (walk) as you normally would - being sedentary after surgery can cause blood clots.   ? Your eye(s) and eyelid(s) may be painful and tender. This is normal after surgery.      Contact information and follow-up  ? Return to the Eye  Clinic for a follow-up appointment with your physician as scheduled. If no appointment has been scheduled:   - [facility] eye clinic: [phone] for an appointment with Dr. Cook within 1 to 2 weeks from your date of surgery.   -  [facility] eye clinic: [phone] for an appointment with Dr. Cook within 1 to 2 weeks from your date of surgery.     ? For severe pain, bleeding, or loss of vision, call the [facility] at [phone] or [facility] at [phone].     After hours or on weekends and holidays, call [phone] and ask to speak with the ophthalmologist on call.    An on call person can be reached after hours for concerns. The on call doctor should not call in medication refill requests after hours or on weekends, so please plan accordingly. An effort has been made to provide adequate pain medications following every surgery, and refills will not be provided in most instances. Narcotic pain medications cannot be called in.     Activity restrictions and driving  ? Avoid heavy lifting, bending, exercise or strenuous activity for 1 week after surgery.  You may resume other activities and return to work as tolerated.  ? You may not resume driving if you are using narcotic pain medications (such as Norco, Percocet, Tylenol #3).    Medications  ? Restart all regular home medications and eye drops. If you take Plavix or  Aspirin on a regular basis, wait for 72 hours after your surgery before restarting these in order to decrease the risk of bleeding complications.  ? Avoid aspirin and aspirin-like medications (Motrin, Aleve, Ibuprofen, [place] etc) for 72 hours to reduce the risk of bleeding. You may take Tylenol (acetaminophen) for pain.  ? In addition to your home medications, take the following post-operative medications as prescribed by your physician.    ? Apply antibiotic ointment to all sutures three times a day, and into  the operated eye(s) at night.  ? In many cases, postoperative discomfort can be managed with Tylenol alone. If narcotic pain medication was prescribed, take pain pills at prescribed frequency as needed for pain.    ? WARNING: Most prescription pain medications contain Tylenol  (acetaminophen). You must not take more than 4,000 mg of acetaminophen per  24-hour period. This is equivalent to 6 tablets of Darvocet, 12 tablets of Norco, Percocet or Tylenol #3. If you take other over-the-counter medications containing acetaminophen, you must take the amount of acetaminophen into account and reduce the number of prescribed pain pills accordingly.  ? Prescribed narcotic medications may make you drowsy. You must not drive a car, operate heavy machinery or drink alcohol while taking them.  ? Prescribed narcotic pain medications may cause constipation and nausea. Take them with some food to prevent a stomach upset.    Same Day Surgery Discharge Instructions for  Sedation and General Anesthesia       It's not unusual to feel dizzy, light-headed or faint for up to 24 hours after surgery or while taking pain medication.  If you have these symptoms: sit for a few minutes before standing and have someone assist you when you get up to walk or use the bathroom.      You should rest and relax for the next 24 hours. We recommend you make arrangements to have an adult stay with you for at least 24 hours after your discharge.  Avoid hazardous and strenuous activity.      DO NOT DRIVE any vehicle or operate mechanical equipment for 24 hours following the end of your surgery.  Even though you may feel normal, your reactions may be affected by the medication you have received.      Do not drink alcoholic beverages for 24 hours following surgery.       Slowly progress to your regular diet as you feel able. It's not unusual to feel nauseated and/or vomit after receiving anesthesia.  If you develop these symptoms, drink clear liquids (apple  juice, ginger ale, broth, 7-up, etc. ) until you feel better.  If your nausea and vomiting persists for 24 hours, please notify your surgeon.        All narcotic pain medications, along with inactivity and anesthesia, can cause constipation. Drinking plenty of liquids and increasing fiber intake will help.      For any questions of a medical nature, call your surgeon.      Do not make important decisions for 24 hours.      If you had general anesthesia, you may have a sore throat for a couple of days related to the breathing tube used during surgery.  You may use Cepacol lozenges to help with this discomfort.  If it worsens or if you develop a fever, contact your surgeon.       If you feel your pain is not well managed with the pain medications prescribed by your surgeon, please contact your surgeon's office to let them know so they can address your concerns.             **If you have questions or concerns about your procedure,   call Dr. Cook at [phone]**

## 2019-08-16 NOTE — OP NOTE
PREOPERATIVE DIAGNOSES:     1.  Bilateral upper eyelid dermatochalasis.     POSTOPERATIVE DIAGNOSES:   1.  Bilateral upper eyelid dermatochalasis.      PROCEDURE PERFORMED:     1.  Bilateral upper blepharoplasty.     SURGEON: Randolph Cook MD    ASSISTANTS:  Sofia Laura MD     ANESTHESIA:  Monitored with local infiltration of 1% lidocaine with epinephrine.     EBL:  Less than 5cc.    COMPLICATIONS: None    HISTORY AND INDICATIONS: Holly Muir presented with drooping of the upper eyelids causing loss of her superior visual field and interfering with her activities of daily living. After the risks, benefits and alternatives to the procedure were explained to the patient and all questions answered, informed consent was obtained.     PROCEDURE:  Holly Muir  was brought to the operating room and placed supine on the operating table.  IV sedation was given.  In the preoperative area in the seated position the area of the brow to be elevated  was marked at the inferior brow hairs. The upper lid crease and excess upper eyelid skin was marked on each upper eyelid.  The upper eyelids and eyebrows were infiltrated with local anesthetic.  The patient was prepped and draped in the typical sterile ophthalmic fashion.  Attention was directed to the right side.  Skin was incised with a #15 blade following the marked lines.  Skin flap was excised with a high temperature cautery.  Hemostasis was obtained.  Dissection was carried in the suborbicularis plane over the orbital rim superolaterally. Multiple 5-0 chromic gut sutures were used to secure the superior edge of orbicularis to the arcus marginalis to support the lateral brow. Nasal and central fat pads were conservatively debulked with thermal cautery. The skin was closed with running 6-0 plain fast-absorbing gut suture.  Attention was directed to the opposite side where the same procedure was performed.  Erythromycin ophthalmic ointment was applied to the  incision sites.  The patient tolerated the procedure well and left the operating room in stable condition.         DANELLE HARRIS MD

## 2019-08-16 NOTE — ANESTHESIA POSTPROCEDURE EVALUATION
Patient: Holly Muir    Procedure(s):  BILATERAL UPPER LID BLEPHAROPLASTY    Diagnosis:BILATERAL DERMATOCHALSIS  Diagnosis Additional Information: No value filed.    Anesthesia Type:  No value filed.    Note:  Anesthesia Post Evaluation    Patient location during evaluation: PACU  Patient participation: Able to fully participate in evaluation  Level of consciousness: awake and alert  Pain management: adequate  Airway patency: patent  Cardiovascular status: acceptable and hemodynamically stable  Respiratory status: acceptable and unassisted  Hydration status: acceptable  PONV: none             Last vitals:  Vitals:    08/16/19 1202 08/16/19 1215 08/16/19 1315   BP: 135/73 139/85 (!) 140/82   Pulse: 80 80    Resp: 14 19 16   Temp: 36.2  C (97.1  F)     SpO2: 99% 99% 98%         Electronically Signed By: Saqib Gonzalez DO  August 16, 2019  1:56 PM

## 2019-08-22 ENCOUNTER — TELEPHONE (OUTPATIENT)
Dept: OPHTHALMOLOGY | Facility: CLINIC | Age: 64
End: 2019-08-22

## 2019-08-22 DIAGNOSIS — M17.10 ARTHRITIS OF KNEE: ICD-10-CM

## 2019-08-22 DIAGNOSIS — M25.562 ARTHRALGIA OF BOTH KNEES: ICD-10-CM

## 2019-08-22 DIAGNOSIS — M25.561 ARTHRALGIA OF BOTH KNEES: ICD-10-CM

## 2019-08-22 RX ORDER — TRAMADOL HYDROCHLORIDE 50 MG/1
TABLET ORAL
Qty: 45 TABLET | Refills: 0 | Status: SHIPPED | OUTPATIENT
Start: 2019-08-22 | End: 2019-09-24

## 2019-08-22 NOTE — TELEPHONE ENCOUNTER
S/p bilater lid surgery for ptosis 8-16-19    Reviewed may change to bland ointment or Vaseline to suture site once EES ointment runs out-- may continue for long period to help with suture site healing/scarring.  Pt may shampoo hair.  Reviewed to use tilt head back method to keep soapy water out of eyes.    Reviewed does have scheduled post-op 8-28-19 at [facility]    Pt verbally demonstrated understanding and seemed satisfied with information    Pt doing well otherwise per pt and aware to call for any new changes/symptoms/concerns    Note to Dr. Laura for review  Kulwinder Hernandez RN RN 9:37 AM 08/22/19

## 2019-08-28 ENCOUNTER — OFFICE VISIT (OUTPATIENT)
Dept: OPHTHALMOLOGY | Facility: CLINIC | Age: 64
End: 2019-08-28
Payer: MEDICARE

## 2019-08-28 DIAGNOSIS — H02.831 DERMATOCHALASIS OF BOTH UPPER EYELIDS: Primary | ICD-10-CM

## 2019-08-28 DIAGNOSIS — H02.834 DERMATOCHALASIS OF BOTH UPPER EYELIDS: Primary | ICD-10-CM

## 2019-08-28 PROCEDURE — 99024 POSTOP FOLLOW-UP VISIT: CPT | Performed by: OPHTHALMOLOGY

## 2019-08-28 NOTE — NURSING NOTE
Patient presents with:  Post-op Blepharaplasty        Referring Provider:  Tiffany King MD PhD  [address] [place] [address]        Melanie Jeans, Optometric Assistant

## 2019-08-28 NOTE — PROGRESS NOTES
Chief Complaint(s) and History of Present Illness(es)     Post-op Blepharaplasty               Holly Muir is status post bilateral upper eyelid blepharoplasty .  Incision(s) healing well.  The lid(s)  are  in excellent position.    I have recommended:  * Continue antibiotic ointment or bland lubricating ointment (eg vaseline or aquaphor) to the incision site at bedtime.   * Warm soaks 3-4x daily until all edema and ecchymoses resolve  * Return to clinic in 2 months     Attending Physician Attestation:  Complete documentation of historical and exam elements from today's encounter can be found in the full encounter summary report (not reduplicated in this progress note).  I personally obtained the chief complaint(s) and history of present illness.  I confirmed and edited as necessary the review of systems, past medical/surgical history, family history, social history, and examination findings as documented by others; and I examined the patient myself.  I personally reviewed the relevant tests, images, and reports as documented above.  I formulated and edited as necessary the assessment and plan and discussed the findings and management plan with the patient and family. - Randolph Cook MD

## 2019-09-04 ENCOUNTER — TELEPHONE (OUTPATIENT)
Dept: PEDIATRICS | Facility: CLINIC | Age: 64
End: 2019-09-04

## 2019-09-04 NOTE — TELEPHONE ENCOUNTER
[facility] calls to state Hydroxyzine was dc'd between 6/25 to 7/2 in their records, but it is still ordered on our MAR and in 6/27 OV. Reviewed other encounters. Her Gabapentin was increased that day and so she was wondering if it was stopped due to increased fall risk?   She sees outside psych so she is awaiting a response from them as well.   RN says she has been doing well off of it.   Asya Howell RN

## 2019-09-04 NOTE — TELEPHONE ENCOUNTER
Called [facility] at [phone] but  hasn't been set up yet. Called patient and she is taking Gabapentin 600mg TID and denies side effects.  Asya Howell RN

## 2019-09-04 NOTE — TELEPHONE ENCOUNTER
Noted. This is the same as on our chart. Will discontinue hydroxyzine for now since she is not using it and is doing fine.

## 2019-09-05 RX ORDER — KETOROLAC TROMETHAMINE 5 MG/ML
1 SOLUTION OPHTHALMIC 4 TIMES DAILY
Qty: 1 BOTTLE | Refills: 1 | Status: SHIPPED | OUTPATIENT
Start: 2019-09-05 | End: 2019-12-10

## 2019-09-05 RX ORDER — PREDNISOLONE ACETATE 10 MG/ML
1 SUSPENSION/ DROPS OPHTHALMIC 4 TIMES DAILY
Qty: 1 BOTTLE | Refills: 1 | Status: SHIPPED | OUTPATIENT
Start: 2019-09-05 | End: 2019-12-10

## 2019-09-05 RX ORDER — OFLOXACIN 3 MG/ML
1 SOLUTION/ DROPS OPHTHALMIC 4 TIMES DAILY
Qty: 1 BOTTLE | Refills: 1 | Status: SHIPPED | OUTPATIENT
Start: 2019-09-05 | End: 2019-12-10

## 2019-09-05 NOTE — TELEPHONE ENCOUNTER
Patient called back, relayed message & patient verbalized understanding. Attempt #3- unable to get a hold of RN so patient will inform her. Patient verbalized understanding.    Asya Howell RN

## 2019-09-06 ENCOUNTER — OFFICE VISIT (OUTPATIENT)
Dept: PEDIATRICS | Facility: CLINIC | Age: 64
End: 2019-09-06
Payer: MEDICARE

## 2019-09-06 VITALS
TEMPERATURE: 97.4 F | DIASTOLIC BLOOD PRESSURE: 65 MMHG | WEIGHT: 210.9 LBS | HEART RATE: 73 BPM | HEIGHT: 62 IN | SYSTOLIC BLOOD PRESSURE: 105 MMHG | BODY MASS INDEX: 38.81 KG/M2 | OXYGEN SATURATION: 98 %

## 2019-09-06 DIAGNOSIS — Z01.818 PREOP GENERAL PHYSICAL EXAM: Primary | ICD-10-CM

## 2019-09-06 DIAGNOSIS — Z23 NEED FOR PROPHYLACTIC VACCINATION AND INOCULATION AGAINST INFLUENZA: ICD-10-CM

## 2019-09-06 DIAGNOSIS — H26.9 CATARACT, UNSPECIFIED CATARACT TYPE, UNSPECIFIED LATERALITY: ICD-10-CM

## 2019-09-06 PROCEDURE — 90682 RIV4 VACC RECOMBINANT DNA IM: CPT | Performed by: NURSE PRACTITIONER

## 2019-09-06 PROCEDURE — 99215 OFFICE O/P EST HI 40 MIN: CPT | Mod: 25 | Performed by: NURSE PRACTITIONER

## 2019-09-06 PROCEDURE — G0008 ADMIN INFLUENZA VIRUS VAC: HCPCS | Performed by: NURSE PRACTITIONER

## 2019-09-06 RX ORDER — ACETAMINOPHEN 500 MG/1
TABLET ORAL
Refills: 99 | COMMUNITY
Start: 2019-08-06 | End: 2020-04-10

## 2019-09-06 ASSESSMENT — MIFFLIN-ST. JEOR: SCORE: 1468.86

## 2019-09-06 NOTE — PROGRESS NOTES
[address] [address]  [phone]  Dept: [phone]    PRE-OP EVALUATION:  Today's date: 2019    Holly Muir (: 1955) presents for pre-operative evaluation assessment as requested by Dr. Winn.  She requires evaluation and anesthesia risk assessment prior to undergoing surgery/procedure for treatment of cataracts .    Proposed Surgery/ Procedure: LEFT PHACOEMULSIFICATION, CATARACT, WITH STANDARD IOL INSERTION, RIGHT PHACOEMULSIFICATION, CATARACT, WITH STANDARD IOL INSERTION  Date of Surgery/ Procedure: 19, 19  Time of Surgery/ Procedure: 9:35AM, 8:20AM  Hospital/Surgical Facility: [facility]  Fax number for surgical facility:   Primary Physician: Tiffany King  Type of Anesthesia Anticipated: Combined MAC with Local    Patient has a Health Care Directive or Living Will:  NO    1. NO - Do you have a history of heart attack, stroke, stent, bypass or surgery on an artery in the head, neck, heart or legs?  2. NO - Do you ever have any pain or discomfort in your chest?  3. NO - Do you have a history of  Heart Failure?  4. NO - Are you troubled by shortness of breath when: walking on the level, up a slight hill or at night?  5. NO - Do you currently have a cold, bronchitis or other respiratory infection?  6. NO - Do you have a cough, shortness of breath or wheezing?  7. YES - DO YOU SOMETIMES GET PAINS IN THE CALVES OF YOUR LEGS WHEN YOU WALK? arthritis in bilateral knees, worst in the left side and already seeing orthopedics   8. NO - Do you or anyone in your family have previous history of blood clots?  9. NO - Do you or does anyone in your family have a serious bleeding problem such as prolonged bleeding following surgeries or cuts?  10. NO - Have you ever had problems with anemia or been told to take iron pills?  11. NO - Have you had any abnormal blood loss such as black, tarry or bloody stools, or abnormal vaginal  bleeding?  12. YES - HAVE YOU EVER HAD A BLOOD TRANSFUSION? 2000  13. NO - Have you or any of your relatives ever had problems with anesthesia?  14. NO - Do you have sleep apnea, excessive snoring or daytime drowsiness?  15. NO - Do you have any prosthetic heart valves?  16. NO - Do you have prosthetic joints?  17. NO - Is there any chance that you may be pregnant?      HPI:     HPI related to upcoming procedure:  undergoing surgery/procedure for treatment of cataracts .    Proposed Surgery/ Procedure: LEFT PHACOEMULSIFICATION, CATARACT, WITH STANDARD IOL INSERTION, RIGHT PHACOEMULSIFICATION, CATARACT, WITH STANDARD IOL INSERTION  Date of Surgery/ Procedure: 9/12/19, 9/26/19    See problem list for active medical problems.  Problems all longstanding and stable, except as noted/documented.  See ROS for pertinent symptoms related to these conditions.    DIABETES - Patient has a longstanding history of DiabetesType Type II . Patient is being treated with diet, oral agents and insulin injections and denies significant side effects. Control has been fair. Complicating factors include but are not limited to: hypertension and hyperlipidemia.     HYPERLIPIDEMIA - Patient has a long history of significant Hyperlipidemia requiring medication for treatment with recent good control. Patient reports no problems or side effects with the medication.     HYPERTENSION - Patient has longstanding history of HTN , currently denies any symptoms referable to elevated blood pressure. Specifically denies chest pain, palpitations, dyspnea, orthopnea, PND or peripheral edema. Blood pressure readings have been in normal range. Current medication regimen is as listed below. Patient denies any side effects of medication.       MEDICAL HISTORY:     Patient Active Problem List    Diagnosis Date Noted     Anal stenosis 06/27/2019     Priority: Medium     S/P partial colectomy 06/27/2019     Priority: Medium     GERD (gastroesophageal reflux  disease) 01/23/2019     Priority: Medium     Anemia 01/22/2019     Priority: Medium     Chest pain, musculoskeletal 01/22/2019     Priority: Medium     Diabetic neuropathy (H) 01/22/2019     Priority: Medium     Hyponatremia 01/22/2019     Priority: Medium     Schizoaffective disorder (H) 01/22/2019     Priority: Medium     Obesity (BMI 35.0-39.9) with comorbidity (H) 11/14/2018     Priority: Medium     Chronic pain syndrome 08/08/2018     Priority: Medium     Chronic DJD the knee and back. Tramadol 2 tablets daily. Initial agreement signed in 2014.   New pain agreement signed 8/8/2018        Type 2 diabetes mellitus with hyperglycemia (H) 06/08/2016     Priority: Medium     Visual hallucination 04/21/2016     Priority: Medium     Fecal incontinence due to anorectal disorder 09/28/2015     Priority: Medium     Anxiety 09/02/2015     Priority: Medium     Bilateral low back pain without sciatica 06/17/2015     Priority: Medium     ACP (advance care planning) 02/12/2015     Priority: Medium     Advance Care Planning:ACP Review and Resources Provided:  Reviewed chart for advance care plan.  Holly LORE Muir has no plan or code status on file. Discussed available resources and provided with information. Confirmed/documented designated decision maker(s). See permanent comments section of demographics in clinical tab. Added by Zara Elias on 2/12/2015           Urinary retention 01/22/2015     Priority: Medium     Colostomy, evaluate (H) 10/17/2014     Priority: Medium     Encounter for long-term (current) use of insulin (H) 10/12/2014     Priority: Medium     Hypoglycemia due to insulin 09/17/2014     Priority: Medium     Rotator cuff impingement syndrome 08/07/2014     Priority: Medium     Health Care Home 09/20/2013     Priority: Medium     Date:  3-4-16  Status:  Accepted           Cataracts, both eyes 05/08/2013     Priority: Medium     Dermatochalasis 05/10/2012     Priority: Medium     Presbyopia 05/10/2012      Priority: Medium     OCD (obsessive compulsive disorder) 05/10/2012     Priority: Medium     HTN, goal below 140/90 05/10/2012     Priority: Medium     Pain in joint, hand 01/25/2012     Priority: Medium     Trigger finger, acquired 01/25/2012     Priority: Medium     Problem list name updated by automated process. Provider to review       Synovitis and tenosynovitis 01/25/2012     Priority: Medium     Problem list name updated by automated process. Provider to review       Obesity 03/23/2011     Priority: Medium     Type 2 diabetes mellitus, uncontrolled A1C goal <8% 10/31/2010     Priority: Medium     3:1 insulin to carb ratio       Mixed incontinence urge and stress 06/01/2010     Priority: Medium     Memory loss 06/01/2010     Priority: Medium     Diabetes coma around 2003       S/P colostomy (H) 04/03/2010     Priority: Medium     Due to injury to colon while she had a hysterectomy (age late 30s):  Seen Dr. Doherty in colorectal surgery at [facility]-does not want to operated. Needs her social support and memory loss issues be resolved first.        History of cervical cancer 09/23/2009     Priority: Medium     status post NATALIE/BSO. Being followed by Dr. Angelita Addison at [facility] gyn/onc       Severe major depression with psychotic features (H) 07/31/2009     Priority: Medium     Psychiatrist: Dr. Perkins at [facility].   Neuropsychological evalation at [facility] on 12/1/09: lower end cognitive functioning and multifactorial in etiologies including effects of psychosis, medications nd perhaps non-restorative sleep. Also has features of OCD. Recommend psychiatry referral for further evaluation of past Dx of schizophrenia and also psychotherapy.   Sees a therapist monthly now (has met goals) but will monitor for relapse        Hyperlipidemia LDL goal <100 01/27/2009     Priority: Medium     Lumbago 01/29/2014     Priority: Low     Sprain of lumbar region  01/29/2014     Priority: Low     Generalized osteoarthrosis, unspecified site 01/29/2014     Priority: Low      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     Past Surgical History:   Procedure Laterality Date     ARTHROSCOPY KNEE RT/LT      LT     BLEPHAROPLASTY BILATERAL Bilateral 8/16/2019    Procedure: BILATERAL UPPER LID BLEPHAROPLASTY;  Surgeon: Randolph Cook MD;  Location: [facility] OR     BREAST LUMPECTOMY, RT/LT      LT-benign      C EXCIS PTERYGIUM  10/08    [facility]     C STOMACH SURGERY PROCEDURE UNLISTED       COLONOSCOPY  5/10/2012    Procedure:COLONOSCOPY; screening colonoscopy; Surgeon:MILLA VEGA; Location:[facility] OR     COLONOSCOPY WITH CO2 INSUFFLATION N/A 8/2/2019    Procedure: Colonoscopy with CO2 insufflation;  Surgeon: Himanshu Hi MD;  Location: [facility] OR     COLOSTOMY  2000     COLOSTOMY       [facility] COLONOSCOPY THRU STOMA, DIAGNOSTIC  2002    normal per report-no records available-records destroyed     HYSTERECTOMY, PAP NO LONGER INDICATED      done in [place]-cervical cancer     IMPLANT STIMULATOR SACRAL NERVE STAGE ONE Right 3/10/2015    Procedure: IMPLANT STIMULATOR SACRAL NERVE STAGE ONE;  Surgeon: Teodora Yusuf MD;  Location: [facility] OR     IMPLANT STIMULATOR SACRAL NERVE STAGE TWO Right 3/31/2015    Procedure: IMPLANT STIMULATOR SACRAL NERVE STAGE TWO;  Surgeon: Teodora Yusuf MD;  Location: [facility] OR     REPAIR PTOSIS Bilateral 08/16/2019     SALPINGO OOPHORECTOMY,R/L/JENNIFER      Salpingo Oophorectomy, RT/LT/JENNIFER     Current Outpatient Medications   Medication Sig Dispense Refill     albuterol (PROAIR HFA/PROVENTIL HFA/VENTOLIN HFA) 108  (90 Base) MCG/ACT inhaler Inhale 2 puffs into the lungs every 6 hours 1 Inhaler 0     ARIPiprazole (ABILIFY) 30 MG tablet Take 1 tablet (30 mg) by mouth daily       ARIPiprazole (ABILIFY) 5 MG tablet Take 1 tablet (5 mg) by mouth At Bedtime Along with 5mg tab to total 25mg.  Prescribed by Dr. Isael Jim[facility]nez at [facility] 30 tablet 1     aspirin ([employer] ASPIRIN ADULT LOW DOSE) 81 MG chewable tablet TAKE 1 TABLET (81 MG) BY MOUTH DAILY 90 tablet 3     BASAGLAR 100 UNIT/ML injection Take 55 units daily. 60 mL 0     blood glucose (ACCU-CHEK CELIA) test strip Use to test blood sugars 3 times daily or as directed. 300 each 0     blood glucose monitoring (ACCU-CHEK CELIA PLUS) meter device kit Use to test blood sugars 4 times daily or as directed. 1 kit 0     blood glucose monitoring (ACCU-CHEK FASTCLIX) lancets Use to test blood sugar 6 times daily or as directed. 510 each 1     buPROPion (WELLBUTRIN XL) 150 MG 24 hr tablet TAKE 3 TABLETS (450 MG) BY MOUTH EVERY MORNING       busPIRone HCl (BUSPAR) 30 MG tablet TAKE 1 TABLET BY MOUTH TWICE A DAY 60 tablet 0     Cholecalciferol (VITAMIN D) 2000 units tablet Take 2,000 Units by mouth daily 100 tablet 3     gabapentin (NEURONTIN) 600 MG tablet Take 1 tablet (600 mg) by mouth 3 times daily 90 tablet 11     ibuprofen (ADVIL/MOTRIN) 400 MG tablet Take 1 tablet (400 mg) by mouth every 8 hours as needed for moderate pain (with food)       insulin aspart (NOVOLOG FLEXPEN) 100 UNIT/ML pen 15 unit subcutaneous per meal three times a day. 30 mL 5     insulin pen needle 31G X 5 MM Use four times 4 day times a day 400 each 1     ketorolac (ACULAR) 0.5 % ophthalmic solution Apply 1 drop to eye 4 times daily for 21 days Start 2 days prior to surgery in operative eye. 1 Bottle 1     metFORMIN (GLUCOPHAGE) 1000 MG tablet TAKE 1 TABLET BY MOUTH TWICE A DAY WITH MEALS 180 tablet 0     Multiple Vitamin (DAILY-ISAAC) TABS TAKE 1 TABLET BY MOUTH DAILY 30 tablet 11     nystatin (MYCOSTATIN)  953861 UNIT/GM POWD Apply 1 dose topically 3 times daily as needed 60 g 3     ofloxacin (OCUFLOX) 0.3 % ophthalmic solution Apply 1 drop to eye 4 times daily for 10 days Start 2 days prior to surgery in operative eye 1 Bottle 1     omeprazole (PRILOSEC) 20 MG DR capsule TAKE 2 CAPSULES BY MOUTH DAILY TAKE 30-60 MINUTES BEFORE A MEAL. 180 capsule 3     order for DME Equipment being ordered: grab bar for bath tub/shower. 1 Units 0     order for DME Equipment being ordered: railing for stairs at home. 1 Units 0     order for DME Equipment being ordered: Knee walker  1 Device 0     order for DME by Device route continuous Air walker -  Use as instructed 1 Device 0     ORDER FOR DME Equipment being ordered:  Ostomy supplies.  Drain Pouch by Valarie #.    Also needs Barrier by [place] #. 3 Month 4     ORDER FOR DME Equipment being ordered: Cane 1 Units 0     PARoxetine (PAXIL) 20 MG tablet TAKE 2 TABLETS AT BEDTIME (Prescribed by Dr. Isael Jiménez at [facility]) 30 tablet      pioglitazone (ACTOS) 30 MG tablet Take 1 tablet (30 mg) by mouth daily 90 tablet 1     pioglitazone (ACTOS) 45 MG tablet Take 1 tablet (45 mg) by mouth daily 90 tablet 3     prednisoLONE acetate (PRED FORTE) 1 % ophthalmic suspension Apply 1 drop to eye 4 times daily for 21 days Start 2 days prior to surgery in operative eye. 1 Bottle 1     propranolol ER (INDERAL LA) 60 MG 24 hr capsule Take 1 capsule (60 mg) by mouth daily 30 capsule 11     ramipril (ALTACE) 5 MG capsule Take 1 capsule (5 mg) by mouth daily 30 capsule 10     salicylic acid (MEDIPLAST) 40 % miscellaneous Apply to warts nightly as tolerated. 25 each 11     simvastatin (ZOCOR) 20 MG tablet Take 1 tablet (20 mg) by mouth At Bedtime 30 tablet 11     traMADol (ULTRAM) 50 MG tablet TAKE 1/2 TABLET BY MOUTH 3 TIMES A DAY 45 tablet 0     traZODone (DESYREL) 50 MG tablet Take 50 mg by mouth At Bedtime  1     UNIVERSAL REMOVER WIPES EX MISC uses to remove barrier  "from colostomy bag at time of change 1 Each 3     BASAGLAR 100 UNIT/ML injection Inject 55 Units Subcutaneous At Bedtime 16.5 mL 3     OTC products: no recent use of OTC ASA, NSAIDS or Steroids and no use of herbal medications or other supplements    Allergies   Allergen Reactions     Morphine Sulfate Itching      Latex Allergy: NO    Social History     Tobacco Use     Smoking status: Former Smoker     Last attempt to quit: 8/9/2016     Years since quitting: 3.0     Smokeless tobacco: Never Used   Substance Use Topics     Alcohol use: Yes     Alcohol/week: 0.0 oz     Comment: social occasions     History   Drug Use No       REVIEW OF SYSTEMS:   CONSTITUTIONAL: NEGATIVE for fever, chills, change in weight  INTEGUMENTARY/SKIN: NEGATIVE for rash   EYES: POSITIVE for Hx cataracts  ENT/MOUTH: NEGATIVE for ear, mouth and throat problems  RESP: NEGATIVE for significant cough or SOB  CV: NEGATIVE for chest pain, palpitations or peripheral edema  GI: NEGATIVE for nausea, poor appetite, vomiting and weight loss  : NEGATIVE for frequency, dysuria, or hematuria  MUSCULOSKELETAL:POSITIVE  for arthralgias  and NEGATIVE for joint swelling  and joint warmth   NEURO: NEGATIVE for weakness, dizziness or paresthesias  ENDOCRINE: NEGATIVE for temperature intolerance, skin/hair changes  ENDOCRINE: POSITIVE  for HX diabetes  HEME: NEGATIVE for bleeding problems  PSYCHIATRIC: NEGATIVE for changes in mood or affect    EXAM:   /65 (BP Location: Right arm, Patient Position: Sitting, Cuff Size: Adult Large)   Pulse 73   Temp 97.4  F (36.3  C) (Temporal)   Ht 1.581 m (5' 2.25\")   Wt 95.7 kg (210 lb 14.4 oz)   SpO2 98%   Breastfeeding? No   BMI 38.26 kg/m      GENERAL APPEARANCE: healthy, alert and no distress     EYES: Eyes grossly normal to inspection and conjunctivae and sclerae normal     HENT: ear canals and TM's normal and nose and mouth without ulcers or lesions     NECK: no adenopathy     RESP: lungs clear to auscultation " - no rales, rhonchi or wheezes     CV: regular rates and rhythm     ABDOMEN: soft, nontender     MS: extremities normal- no gross deformities noted and gait normal     SKIN: no suspicious lesions or rashes     NEURO: Normal strength and tone, sensory exam grossly normal, mentation intact and speech normal     PSYCH: mentation appears normal. and affect normal/bright     LYMPHATICS: No cervical adenopathy    DIAGNOSTICS:   No labs or EKG required for low risk surgery (cataract, skin procedure, breast biopsy, etc)    Recent Labs   Lab Test 07/29/19  0933 05/10/19  08/08/18  1452  10/13/11  0930   HGB 11.8  --   --   --   --  13.5     --   --   --   --  369     --   --  131*   < > 142   POTASSIUM 3.9  --   --  3.9   < > 5.2   CR 0.72  --   --  0.74   < > 0.69   A1C 8.3* 11.9*   < > 10.3*   < >  --     < > = values in this interval not displayed.      IMPRESSION:   Reason for surgery/procedure:  undergoing surgery/procedure for treatment of cataracts .    Proposed Surgery/ Procedure: LEFT PHACOEMULSIFICATION, CATARACT, WITH STANDARD IOL INSERTION, RIGHT PHACOEMULSIFICATION, CATARACT, WITH STANDARD IOL INSERTION  Date of Surgery/ Procedure: 9/12/19, 9/26/19    Diagnosis/reason for consult: Preop evaluation     The proposed surgical procedure is considered LOW risk.    REVISED CARDIAC RISK INDEX  The patient has the following serious cardiovascular risks for perioperative complications such as (MI, PE, VFib and 3  AV Block):  No serious cardiac risks  INTERPRETATION: 0 risks: Class I (very low risk - 0.4% complication rate)    The patient has the following additional risks for perioperative complications:  The ASCVD Risk score (Samuelpetrona RANGEL Jr., et al., 2013) failed to calculate for the following reasons:    The valid total cholesterol range is 130 to 320 mg/dL      ICD-10-CM    1. Preop general physical exam Z01.818    2. Cataract, unspecified cataract type, unspecified laterality H26.9    3. Need for  prophylactic vaccination and inoculation against influenza Z23 C RIV4 (FLUBLOK) VACCINE PRESERVATIVE FREE, FLUBLOK EGG FREE, IM 18-64 YEARS (Recommended  50-64 YEARS) [35718]     Vaccine Administration, Initial [49313]       RECOMMENDATIONS:     --Patient is to take all scheduled medications on the day of surgery EXCEPT for modifications listed below.    Diabetes Medication Use  -----Hold usual oral and non-insulin diabetic meds (e.g. Metformin, Actos, Glipizide) while NPO.   -----Take 80% of long acting insulin (e.g. Lantus, NPH) while NPO (fasting)  -----Hold short acting insulin (e.g. Novolog, Humalog) while NPO (fasting)      APPROVAL GIVEN to proceed with proposed procedure, without further diagnostic evaluation     Signed Electronically by: KASIE Silver CNP    Copy of this evaluation report is provided to requesting physician.    [facility] Preop Guidelines    Revised Cardiac Risk Index

## 2019-09-06 NOTE — NURSING NOTE
"Chief Complaint   Patient presents with     Pre-Op Exam     DOS:9/12/19, 9/26/19       Initial /65 (BP Location: Right arm, Patient Position: Sitting, Cuff Size: Adult Large)   Pulse 73   Temp 97.4  F (36.3  C) (Temporal)   Ht 1.581 m (5' 2.25\")   Wt 95.7 kg (210 lb 14.4 oz)   SpO2 98%   Breastfeeding? No   BMI 38.26 kg/m   Estimated body mass index is 38.26 kg/m  as calculated from the following:    Height as of this encounter: 1.581 m (5' 2.25\").    Weight as of this encounter: 95.7 kg (210 lb 14.4 oz).  Medication Reconciliation: complete      JERED Oconnor      "

## 2019-09-06 NOTE — PATIENT INSTRUCTIONS
Diabetes Medication Use  -----Hold usual oral and non-insulin diabetic meds (e.g. Metformin, Actos, Glipizide) while NPO.   -----Take 80% of long acting insulin (e.g. Lantus, NPH) while NPO (fasting)  This would be 44 units of the Basaglar   -----Hold short acting insulin (e.g. Novolog, Humalog) while NPO (fasting)      Before Your Surgery      Call your surgeon if there is any change in your health. This includes signs of a cold or flu (such as a sore throat, runny nose, cough, rash or fever).    Do not smoke, drink alcohol or take over the counter medicine (unless your surgeon or primary care doctor tells you to) for the 24 hours before and after surgery.    If you take prescribed drugs: Follow your doctor s orders about which medicines to take and which to stop until after surgery.    Eating and drinking prior to surgery: follow the instructions from your surgeon    Take a shower or bath the night before surgery. Use the soap your surgeon gave you to gently clean your skin. If you do not have soap from your surgeon, use your regular soap. Do not shave or scrub the surgery site.  Wear clean pajamas and have clean sheets on your bed.

## 2019-09-11 ENCOUNTER — ANESTHESIA EVENT (OUTPATIENT)
Dept: SURGERY | Facility: AMBULATORY SURGERY CENTER | Age: 64
End: 2019-09-11

## 2019-09-12 ENCOUNTER — ANESTHESIA (OUTPATIENT)
Dept: SURGERY | Facility: AMBULATORY SURGERY CENTER | Age: 64
End: 2019-09-12
Payer: MEDICARE

## 2019-09-18 DIAGNOSIS — E11.65 UNCONTROLLED TYPE 2 DIABETES MELLITUS WITH HYPERGLYCEMIA, WITH LONG-TERM CURRENT USE OF INSULIN (H): ICD-10-CM

## 2019-09-18 DIAGNOSIS — Z79.4 UNCONTROLLED TYPE 2 DIABETES MELLITUS WITH HYPERGLYCEMIA, WITH LONG-TERM CURRENT USE OF INSULIN (H): ICD-10-CM

## 2019-09-18 NOTE — TELEPHONE ENCOUNTER
LOV- 9/6. Patient is scheduled with endo on 9/20 with labs. Prescription approved per refill protocol.    Asya Howell RN

## 2019-09-19 ENCOUNTER — TELEPHONE (OUTPATIENT)
Dept: ORTHOPEDICS | Facility: CLINIC | Age: 64
End: 2019-09-19

## 2019-09-19 NOTE — TELEPHONE ENCOUNTER
9/19 Instructed patient to call [phone] to reschedule an appointment on 10/4 with Dr. Harrington.     [facility]   Procedure    Ortho/Sports Med/Ent/Eye   [facility] [place]   [phone]

## 2019-09-20 ENCOUNTER — OFFICE VISIT (OUTPATIENT)
Dept: ENDOCRINOLOGY | Facility: CLINIC | Age: 64
End: 2019-09-20
Payer: MEDICARE

## 2019-09-20 VITALS — SYSTOLIC BLOOD PRESSURE: 120 MMHG | OXYGEN SATURATION: 100 % | DIASTOLIC BLOOD PRESSURE: 73 MMHG | HEART RATE: 72 BPM

## 2019-09-20 DIAGNOSIS — E11.65 UNCONTROLLED TYPE 2 DIABETES MELLITUS WITH HYPERGLYCEMIA, WITH LONG-TERM CURRENT USE OF INSULIN (H): ICD-10-CM

## 2019-09-20 DIAGNOSIS — R63.2 BINGE EATING: ICD-10-CM

## 2019-09-20 DIAGNOSIS — F32.A DEPRESSION, UNSPECIFIED DEPRESSION TYPE: Primary | ICD-10-CM

## 2019-09-20 DIAGNOSIS — Z79.4 UNCONTROLLED TYPE 2 DIABETES MELLITUS WITH HYPERGLYCEMIA, WITH LONG-TERM CURRENT USE OF INSULIN (H): ICD-10-CM

## 2019-09-20 PROCEDURE — 99214 OFFICE O/P EST MOD 30 MIN: CPT | Performed by: INTERNAL MEDICINE

## 2019-09-20 RX ORDER — INSULIN GLARGINE 100 [IU]/ML
INJECTION, SOLUTION SUBCUTANEOUS
Qty: 60 ML | Refills: 5 | Status: SHIPPED | OUTPATIENT
Start: 2019-09-20 | End: 2020-10-02

## 2019-09-20 ASSESSMENT — PATIENT HEALTH QUESTIONNAIRE - PHQ9: SUM OF ALL RESPONSES TO PHQ QUESTIONS 1-9: 13

## 2019-09-20 NOTE — PROGRESS NOTES
- Endocrinology Follow up -    Reason for visit/consult:  Uncontrolled type 2 diabetes mellitus with hyperglycemia, with long-term current use of insulin (H)    Primary care provider: Tiffany King      Assessment and Plan    # DM2:   A1c 8.3 on 7/29/2019, by reviewing glucometer I did not see 300s. Mostly 130-250 range. So relatively stable currently.     - Continue current basaglar 655 units daily  - Continue current Novolog     Novolog 15-15-15 (each meal, depends on frequency of meal )      -Continue current metformin 1000 mg bid  - Continue current Actos 45 mg, body weight stable.     # Night time eating habits  She usually wakes up 3 am and eats snacks, glucose am depends on what she eats and patient knows the pattern.   - No soda, no carb snacks during the night    # Mental health  Depression was worse in spring 2019 per patient, but currently tends to be better.   She has psychiatrist follow up on 10/4/2019.   I also encouraged exercise for her.     Her doctor:Dr. Prachi Tenorio at [facility].    # DM complication  Urine microalbumin due (ordered)    -RTC with me in 6 months    I spent 30 minutes with this patient face to face and explained the conditions and plans (more than 50% of time was counseling/coordination of care, compliance, life style) . The patient understood and is satisfied with today's visit. Return to clinic with me in 4 months.         Elizabeth Medrano MD  Staff Physician  Endocrinology and Metabolism  License: [id number]      Current Diabetic Regimens:  Basaglar 55 units daily  NovoLog 15 units each meal  Metformin 1000 twice daily  Actos 45 mg daily    Life Style:  Wake up 10-11 am  Breakfast after  Lunch 1-2 pm: sandwich  Dinner 6 pm: mexican fat foods  Bedtime: 9 pm  3 am : wake up and snacks    Exercise:  walking    DM complications:  Retinopathy: due not been for 1-2 years.   Nephropathy: urine microalbumin negative 8/2018 -  due  Neuropathy:   Most recent LDL:   LDL Cholesterol Calculated   Date Value Ref Range Status   12/14/2018 25 <100 mg/dL Final     Comment:     Desirable:       <100 mg/dl       Glucose Log: I went over her glucometer and wrote down together with patient.  A.m. Glucose 135, 183, 236, 347, 164,  After lunch 86,  Dinnertime 153, 283      Interval History as of 9/20/2019 : Follow-up in 4 months, compliant medications.  However she still have nighttime snacking habit every night, when she eat carbohydrate snacks glucose in the morning was 250s.  Light fruits 160-180s.  Patient mentioned depression was worse spring 2019 but currently relatively okay and she has psychiatrist to follow-up.   Interval History as of 5/2019: Patient came by herself she missed appointment, increasing the dose of insulin, mentioned started to have a glucose increase 300 in the fasting in the morning her mental issue.  Seen by new psychiatrist. Visitign nurse once a week.    HPI: A 61 yo female second follow up DM2, she has remote history of colectomy. This is the third visit.   Used to use V go for several month, she was confused the system, worsened A1C, then we switch back to insulin injection regimens.    Currently lantus 38 utnis and novology 6-8-8 and sliding scale and metformin 1000 bid.   Last visit prscribed actos 30 mg daily, however she mentioned, she is not sure which one is actos and she may not taking actos.      Of note,  she has had sleeping issues, which distracts her life significantly. She cannot sleep at night, usually sleeps in the day, during the night, she has been hungry and eating peanuts butters, jelly, fruits etc.   Sometimes she cannot sleep for 2 days.   She has visiting nurse once a week, yesterday the nurse called our clinic that her glucose was 500. Talked with Mikayla, lantus was increased from 38 to 42 units.     Glucose: she forgot to bring glucometer today.     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     Past Surgical History:   Procedure Laterality Date     ARTHROSCOPY KNEE RT/LT      LT     BLEPHAROPLASTY BILATERAL Bilateral 8/16/2019    Procedure: BILATERAL UPPER LID BLEPHAROPLASTY;  Surgeon: Randolph Cook MD;  [facility]: [facility]     BREAST LUMPECTOMY, RT/LT      LT-benign      C EXCIS PTERYGIUM  10/08    [facility]     C STOMACH SURGERY PROCEDURE UNLISTED       COLONOSCOPY  5/10/2012    Procedure:COLONOSCOPY; screening colonoscopy; Surgeon:MILLA VEGA; [facility]     COLONOSCOPY WITH CO2 INSUFFLATION N/A 8/2/2019    Procedure: Colonoscopy with CO2 insufflation;  Surgeon: Himanshu Hi MD;  Location: [facility]     COLOSTOMY  2000     COLOSTOMY       HC COLONOSCOPY THRU STOMA, DIAGNOSTIC  2002    normal per report-no records available-records destroyed     HYSTERECTOMY, PAP NO LONGER INDICATED      done in [place]-cervical cancer     IMPLANT STIMULATOR SACRAL NERVE STAGE ONE Right 3/10/2015    Procedure: IMPLANT STIMULATOR SACRAL NERVE STAGE ONE;  Surgeon: Teodora Yusuf MD;  [facility]     IMPLANT STIMULATOR SACRAL NERVE STAGE TWO Right 3/31/2015    Procedure: IMPLANT STIMULATOR SACRAL NERVE STAGE TWO;  Surgeon: Teodora Yusuf MD;  [facility]     REPAIR PTOSIS Bilateral 08/16/2019     SALPINGO OOPHORECTOMY,R/L/JENNIFER      Salpingo Oophorectomy, RT/LT/JENNIFER       Allergies:  Allergies   Allergen Reactions     Morphine Sulfate Itching       Current Medications   Current Outpatient Medications   Medication     ARIPiprazole (ABILIFY) 30 MG tablet     ARIPiprazole (ABILIFY) 5 MG tablet     aspirin (CVS ASPIRIN  ADULT LOW DOSE) 81 MG chewable tablet     blood glucose (ACCU-CHEK CELIA) test strip     blood glucose monitoring (ACCU-CHEK CELIA PLUS) meter device kit     blood glucose monitoring (ACCU-CHEK FASTCLIX) lancets     buPROPion (WELLBUTRIN XL) 150 MG 24 hr tablet     busPIRone HCl (BUSPAR) 30 MG tablet     Cholecalciferol (VITAMIN D) 2000 units tablet     [facility]S PAIN RELIEF 500 MG tablet     gabapentin (NEURONTIN) 600 MG tablet     ibuprofen (ADVIL/MOTRIN) 400 MG tablet     insulin aspart (NOVOLOG FLEXPEN) 100 UNIT/ML pen     insulin glargine (BASAGLAR KWIKPEN) 100 UNIT/ML pen     insulin pen needle 31G X 5 MM     ketorolac (ACULAR) 0.5 % ophthalmic solution     metFORMIN (GLUCOPHAGE) 1000 MG tablet     Multiple Vitamin (DAILY-ISAAC) TABS     nystatin (MYCOSTATIN) 396141 UNIT/GM POWD     omeprazole (PRILOSEC) 20 MG DR capsule     ORDER FOR DME     ORDER FOR DME     PARoxetine (PAXIL) 20 MG tablet     pioglitazone (ACTOS) 45 MG tablet     prednisoLONE acetate (PRED FORTE) 1 % ophthalmic suspension     propranolol ER (INDERAL LA) 60 MG 24 hr capsule     ramipril (ALTACE) 5 MG capsule     salicylic acid (MEDIPLAST) 40 % miscellaneous     simvastatin (ZOCOR) 20 MG tablet     traMADol (ULTRAM) 50 MG tablet     traZODone (DESYREL) 50 MG tablet     UNIVERSAL REMOVER WIPES EX MISC     albuterol (PROAIR HFA/PROVENTIL HFA/VENTOLIN HFA) 108 (90 Base) MCG/ACT inhaler     order for DME     order for DME     order for DME     order for DME     Current Facility-Administered Medications   Medication     triamcinolone (KENALOG-40) injection 40 mg     triamcinolone (KENALOG-40) injection 40 mg       Family History:  Family History   Problem Relation Age of Onset     Diabetes Mother      Cerebrovascular Disease Mother      Diabetes Father      Hypertension Father      Cancer Maternal Grandfather      Cancer Paternal Grandfather      Diabetes Brother      Diabetes Sister      Thyroid Disease Daughter      Thyroid Disease Sister      Multiple  Sclerosis Daughter      Glaucoma No family hx of      Macular Degeneration No family hx of        Social History:  Social History     Tobacco Use     Smoking status: Former Smoker     Last attempt to quit: 8/9/2016     Years since quitting: 3.1     Smokeless tobacco: Never Used   Substance Use Topics     Alcohol use: Yes     Alcohol/week: 0.0 oz     Comment: social occasions   taking care of 11 and 18 year grand children [facility]     ROS:  Full review of systems taken with the help of the intake sheet. Otherwise a complete 14 point review of systems was taken and is negative unless stated in the history above.    Physical Exam:   Blood pressure 120/73, pulse 72, SpO2 100 %, not currently breastfeeding.  General: well appearing, no acute distress, pleasant and conversant,   Mental Status/neuro: alert and oriented  Face: symmetrical, normal facial color  Eyes: anicteric, PERRL, no proptosis or lid lag  Neck: suppler, no lymphadenopahty  Thyroid: normal size and texture, no nodule palpable, no bruits  Heart: regular rhythm, S1S2, no murmur appreciated  Lung: clear to auscultation bilaterally  Abdomen: soft, NT/ND, no hepatomegaly  Legs: no swelling or edema  Feet: clean, no deformities or ulcers, 2+ DP pulses, first and second digits mildly decreased sensation      Labs (General):

## 2019-09-20 NOTE — LETTER
9/20/2019         RE: Holly Miur  [address] [address] [id number]        Dear Colleague,    Thank you for referring your patient, Holly Muir, to the [facility]. Please see a copy of my visit note below.                                                      - Endocrinology Follow up -    Reason for visit/consult:  Uncontrolled type 2 diabetes mellitus with hyperglycemia, with long-term current use of insulin (H)    Primary care provider: Tiffany King      Assessment and Plan    # DM2:   A1c 8.3 on 7/29/2019, by reviewing glucometer I did not see 300s. Mostly 130-250 range. So relatively stable currently.     - Continue current basaglar 655 units daily  - Continue current Novolog     Novolog 15-15-15 (each meal, depends on frequency of meal )      -Continue current metformin 1000 mg bid  - Continue current Actos 45 mg, body weight stable.     # Night time eating habits  She usually wakes up 3 am and eats snacks, glucose am depends on what she eats and patient knows the pattern.   - No soda, no carb snacks during the night    # Mental health  Depression was worse in spring 2019 per patient, but currently tends to be better.   She has psychiatrist follow up on 10/4/2019.   I also encouraged exercise for her.     Her doctor:Dr. Prachi Tenorio at [facility].    # DM complication  Urine microalbumin due (ordered)    -RTC with me in 6 months    I spent 30 minutes with this patient face to face and explained the conditions and plans (more than 50% of time was counseling/coordination of care, compliance, life style) . The patient understood and is satisfied with today's visit. Return to clinic with me in 4 months.         Elizabeth Medrano MD  Staff Physician  Endocrinology and Metabolism  License: [id number]      Current Diabetic Regimens:  Basaglar 55 units daily  NovoLog 15 units each meal  Metformin 1000 twice daily  Actos 45 mg daily    Life Style:  Wake up 10-11  am  Breakfast after  Lunch 1-2 pm: sandwich  Dinner 6 pm: mexican fat foods  Bedtime: 9 pm  3 am : wake up and snacks    Exercise:  walking    DM complications:  Retinopathy: due not been for 1-2 years.   Nephropathy: urine microalbumin negative 8/2018 - due  Neuropathy:   Most recent LDL:   LDL Cholesterol Calculated   Date Value Ref Range Status   12/14/2018 25 <100 mg/dL Final     Comment:     Desirable:       <100 mg/dl       Glucose Log: I went over her glucometer and wrote down together with patient.  A.m. Glucose 135, 183, 236, 347, 164,  After lunch 86,  Dinnertime 153, 283      Interval History as of 9/20/2019 : Follow-up in 4 months, compliant medications.  However she still have nighttime snacking habit every night, when she eat carbohydrate snacks glucose in the morning was 250s.  Light fruits 160-180s.  Patient mentioned depression was worse spring 2019 but currently relatively okay and she has psychiatrist to follow-up.   Interval History as of 5/2019: Patient came by herself she missed appointment, increasing the dose of insulin, mentioned started to have a glucose increase 300 in the fasting in the morning her mental issue.  Seen by new psychiatrist. Visitign nurse once a week.    HPI: A 63 yo female second follow up DM2, she has remote history of colectomy. This is the third visit.   Used to use V go for several month, she was confused the system, worsened A1C, then we switch back to insulin injection regimens.    Currently lantus 38 utnis and novology 6-8-8 and sliding scale and metformin 1000 bid.   Last visit prscribed actos 30 mg daily, however she mentioned, she is not sure which one is actos and she may not taking actos.      Of note,  she has had sleeping issues, which distracts her life significantly. She cannot sleep at night, usually sleeps in the day, during the night, she has been hungry and eating peanuts butters, jelly, fruits etc.   Sometimes she cannot sleep for 2 days.   She has  visiting nurse once a week, yesterday the nurse called our clinic that her glucose was 500. Talked with Mikayla, lantus was increased from 38 to 42 units.     Glucose: she forgot to bring glucometer today.     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     Past Surgical History:   Procedure Laterality Date     ARTHROSCOPY KNEE RT/LT      LT     BLEPHAROPLASTY BILATERAL Bilateral 8/16/2019    Procedure: BILATERAL UPPER LID BLEPHAROPLASTY;  Surgeon: Randolph Cook MD;  Location: [facility]     BREAST LUMPECTOMY, RT/LT      LT-benign      C EXCIS PTERYGIUM  10/08    [facility]     C STOMACH SURGERY PROCEDURE UNLISTED       COLONOSCOPY  5/10/2012    Procedure:COLONOSCOPY; screening colonoscopy; Surgeon:MILLA VEGA; [facility]     COLONOSCOPY WITH CO2 INSUFFLATION N/A 8/2/2019    Procedure: Colonoscopy with CO2 insufflation;  Surgeon: Himanshu Hi MD;  [facility]: [facility]     COLOSTOMY  2000     COLOSTOMY       HC COLONOSCOPY THRU STOMA, DIAGNOSTIC  2002    normal per report-no records available-records destroyed     HYSTERECTOMY, PAP NO LONGER INDICATED      done in [place]-cervical cancer     IMPLANT STIMULATOR SACRAL NERVE STAGE ONE Right 3/10/2015    Procedure: IMPLANT STIMULATOR SACRAL NERVE STAGE ONE;  Surgeon: Teodora Yusuf MD;  [facility]     IMPLANT STIMULATOR SACRAL NERVE STAGE TWO Right 3/31/2015    Procedure: IMPLANT STIMULATOR SACRAL NERVE STAGE TWO;  Surgeon: Teodora Yusuf MD;  [facility]     REPAIR PTOSIS Bilateral 08/16/2019     SALPINGO OOPHORECTOMY,R/L/JENNIFER      Salpingo Oophorectomy, RT/LT/JENNIFER        Allergies:  Allergies   Allergen Reactions     Morphine Sulfate Itching       Current Medications   Current Outpatient Medications   Medication     ARIPiprazole (ABILIFY) 30 MG tablet     ARIPiprazole (ABILIFY) 5 MG tablet     aspirin (CVS ASPIRIN ADULT LOW DOSE) 81 MG chewable tablet     blood glucose (ACCU-CHEK CELIA) test strip     blood glucose monitoring (ACCU-CHEK CELIA PLUS) meter device kit     blood glucose monitoring (ACCU-CHEK FASTCLIX) lancets     buPROPion (WELLBUTRIN XL) 150 MG 24 hr tablet     busPIRone HCl (BUSPAR) 30 MG tablet     Cholecalciferol (VITAMIN D) 2000 units tablet     CVS PAIN RELIEF 500 MG tablet     gabapentin (NEURONTIN) 600 MG tablet     ibuprofen (ADVIL/MOTRIN) 400 MG tablet     insulin aspart (NOVOLOG FLEXPEN) 100 UNIT/ML pen     insulin glargine (BASAGLAR KWIKPEN) 100 UNIT/ML pen     insulin pen needle 31G X 5 MM     ketorolac (ACULAR) 0.5 % ophthalmic solution     metFORMIN (GLUCOPHAGE) 1000 MG tablet     Multiple Vitamin (DAILY-ISAAC) TABS     nystatin (MYCOSTATIN) 055479 UNIT/GM POWD     omeprazole (PRILOSEC) 20 MG DR capsule     ORDER FOR DME     ORDER FOR DME     PARoxetine (PAXIL) 20 MG tablet     pioglitazone (ACTOS) 45 MG tablet     prednisoLONE acetate (PRED FORTE) 1 % ophthalmic suspension     propranolol ER (INDERAL LA) 60 MG 24 hr capsule     ramipril (ALTACE) 5 MG capsule     salicylic acid (MEDIPLAST) 40 % miscellaneous     simvastatin (ZOCOR) 20 MG tablet     traMADol (ULTRAM) 50 MG tablet     traZODone (DESYREL) 50 MG tablet     UNIVERSAL REMOVER WIPES EX MISC     albuterol (PROAIR HFA/PROVENTIL HFA/VENTOLIN HFA) 108 (90 Base) MCG/ACT inhaler     order for DME     order for DME     order for DME     order for DME     Current Facility-Administered Medications   Medication     triamcinolone (KENALOG-40) injection 40 mg     triamcinolone (KENALOG-40) injection 40 mg       Family History:  Family History   Problem Relation Age of Onset     Diabetes Mother       Cerebrovascular Disease Mother      Diabetes Father      Hypertension Father      Cancer Maternal Grandfather      Cancer Paternal Grandfather      Diabetes Brother      Diabetes Sister      Thyroid Disease Daughter      Thyroid Disease Sister      Multiple Sclerosis Daughter      Glaucoma No family hx of      Macular Degeneration No family hx of        Social History:  Social History     Tobacco Use     Smoking status: Former Smoker     Last attempt to quit: 8/9/2016     Years since quitting: 3.1     Smokeless tobacco: Never Used   Substance Use Topics     Alcohol use: Yes     Alcohol/week: 0.0 oz     Comment: social occasions   taking care of 11 and 18 year grand children and live togter.     ROS:  Full review of systems taken with the help of the intake sheet. Otherwise a complete 14 point review of systems was taken and is negative unless stated in the history above.    Physical Exam:   Blood pressure 120/73, pulse 72, SpO2 100 %, not currently breastfeeding.  General: well appearing, no acute distress, pleasant and conversant,   Mental Status/neuro: alert and oriented  Face: symmetrical, normal facial color  Eyes: anicteric, PERRL, no proptosis or lid lag  Neck: suppler, no lymphadenopahty  Thyroid: normal size and texture, no nodule palpable, no bruits  Heart: regular rhythm, S1S2, no murmur appreciated  Lung: clear to auscultation bilaterally  Abdomen: soft, NT/ND, no hepatomegaly  Legs: no swelling or edema  Feet: clean, no deformities or ulcers, 2+ DP pulses, first and second digits mildly decreased sensation      Labs (General):       Again, thank you for allowing me to participate in the care of your patient.        Sincerely,        Elizabeth Medrano MD

## 2019-09-20 NOTE — PATIENT INSTRUCTIONS
[facility]  Mikayla Sellers RN, Diabetes Educator: [phone]  Clinic Nurses EVELYN Higginbotham; CMA's: Kelly Gates [phone]  DON Stout : [phone]  Clinic Fax: [phone]  On-Call Endocrine at the [facility] (after hours/weekends): [phone] option 4  Scheduling Line: [phone]    Appointment Reminders:  * Please bring meter with for staff to download  * If you are due ONLY for an A1C, it is scheduled with the nurse and will be done in clinic. You do not need to schedule a lab appointment. Fasting is not required for an A1C.  * Refill request should be submitted to your pharmacy. They will contact clinic for approval.

## 2019-09-20 NOTE — NURSING NOTE
[facility]:  PHQ-9 Screening Note  SITUATION/BACKGROUND                                                    Holly Muir is a 64 year old female who completed the PHQ-9 assessment for depression and Score is >9.      Patient has a history of depression. Dr. Medrano aware of PHQ-9 score. Dr. Medrano reviewed information with patient at time of visit and did not feel RNCC needed to talk with patient further. Please refer to Dr. Medrano's progress note for details.      Anahy Bloom RN  Endocrine Care Coordinator  [facility]      Copyright 2016 [employer]

## 2019-09-20 NOTE — NURSING NOTE
Holly Muir's goals for this visit include:   Chief Complaint   Patient presents with     Diabetes     She requests these members of her care team be copied on today's visit information: Yes    PCP: Tiffany King    Referring Provider:  Tiffany King MD PhD  [id number]  [place] [address]    /73 (BP Location: Left arm, Patient Position: Sitting, Cuff Size: Adult Regular)   Pulse 72   SpO2 100%     Do you need any medication refills at today's visit? Yes

## 2019-09-24 DIAGNOSIS — M25.561 ARTHRALGIA OF BOTH KNEES: ICD-10-CM

## 2019-09-24 DIAGNOSIS — M17.10 ARTHRITIS OF KNEE: ICD-10-CM

## 2019-09-24 DIAGNOSIS — M25.562 ARTHRALGIA OF BOTH KNEES: ICD-10-CM

## 2019-09-24 NOTE — TELEPHONE ENCOUNTER
Tramadol      Last Written Prescription Date:  8/22  Last Fill Quantity: 45,   # refills: 0  Last Office Visit: 9/6  Future Office visit:    Next 5 appointments (look out 90 days)    Sep 27, 2019 11:40 AM CDT  [facility] [id number] [place] [id number]  [phone]   Oct 04, 2019 11:20 AM CDT  [facility]el Greene OD  [facility] [id number] [place] [phone]  [phone]   Oct 07, 2019  4:00 PM CDT  [facility] Santy Harrington DO  [facility] [address] [place] [phone]  [phone]   Oct 23, 2019 10:00 AM CDT  [facility] Visit with Randolph Cook MD  [facility] [address] [place] [id number]  [phone]   Oct 25, 2019 11:00 AM CDT  Return Visit with Ariel Greene OD  [facility]) [address] [place] [phone]  [phone]           Routing refill request to provider for review/approval because:  Drug not on the [facility] or [facility] refill protocol or controlled substance

## 2019-09-25 ENCOUNTER — ANESTHESIA EVENT (OUTPATIENT)
Dept: SURGERY | Facility: AMBULATORY SURGERY CENTER | Age: 64
End: 2019-09-25

## 2019-09-25 RX ORDER — TRAMADOL HYDROCHLORIDE 50 MG/1
TABLET ORAL
Qty: 45 TABLET | Refills: 0 | Status: SHIPPED | OUTPATIENT
Start: 2019-09-25 | End: 2019-10-22

## 2019-09-26 ENCOUNTER — HOSPITAL ENCOUNTER (OUTPATIENT)
Facility: AMBULATORY SURGERY CENTER | Age: 64
Discharge: HOME | End: 2019-09-26
Attending: OPHTHALMOLOGY | Admitting: OPHTHALMOLOGY
Payer: MEDICARE

## 2019-09-26 ENCOUNTER — ANESTHESIA (OUTPATIENT)
Dept: SURGERY | Facility: AMBULATORY SURGERY CENTER | Age: 64
End: 2019-09-26
Payer: MEDICARE

## 2019-09-26 VITALS
OXYGEN SATURATION: 98 % | TEMPERATURE: 97.2 F | HEART RATE: 81 BPM | SYSTOLIC BLOOD PRESSURE: 133 MMHG | RESPIRATION RATE: 16 BRPM | DIASTOLIC BLOOD PRESSURE: 69 MMHG

## 2019-09-26 DIAGNOSIS — H25.12 AGE-RELATED NUCLEAR CATARACT OF LEFT EYE: Primary | ICD-10-CM

## 2019-09-26 LAB — GLUCOSE BLDC GLUCOMTR-MCNC: 213 MG/DL (ref 70–99)

## 2019-09-26 PROCEDURE — G8918 PT W/O PREOP ORDER IV AB PRO: HCPCS

## 2019-09-26 PROCEDURE — G8907 PT DOC NO EVENTS ON DISCHARG: HCPCS

## 2019-09-26 PROCEDURE — 66984 XCAPSL CTRC RMVL W/O ECP: CPT | Mod: LT

## 2019-09-26 DEVICE — EYE IMP IOL AMO PCL TECNIS ZCB00 24.0: Type: IMPLANTABLE DEVICE | Site: EYE | Status: FUNCTIONAL

## 2019-09-26 RX ORDER — MOXIFLOXACIN 5 MG/ML
1 SOLUTION/ DROPS OPHTHALMIC
Status: COMPLETED | OUTPATIENT
Start: 2019-09-26 | End: 2019-09-26

## 2019-09-26 RX ORDER — KETOROLAC TROMETHAMINE 5 MG/ML
1 SOLUTION OPHTHALMIC
Status: COMPLETED | OUTPATIENT
Start: 2019-09-26 | End: 2019-09-26

## 2019-09-26 RX ORDER — PHENYLEPHRINE HYDROCHLORIDE 25 MG/ML
1 SOLUTION/ DROPS OPHTHALMIC
Status: COMPLETED | OUTPATIENT
Start: 2019-09-26 | End: 2019-09-26

## 2019-09-26 RX ORDER — NALOXONE HYDROCHLORIDE 0.4 MG/ML
.1-.4 INJECTION, SOLUTION INTRAMUSCULAR; INTRAVENOUS; SUBCUTANEOUS
Status: DISCONTINUED | OUTPATIENT
Start: 2019-09-26 | End: 2019-09-27 | Stop reason: HOSPADM

## 2019-09-26 RX ORDER — SODIUM CHLORIDE, SODIUM LACTATE, POTASSIUM CHLORIDE, CALCIUM CHLORIDE 600; 310; 30; 20 MG/100ML; MG/100ML; MG/100ML; MG/100ML
INJECTION, SOLUTION INTRAVENOUS CONTINUOUS
Status: DISCONTINUED | OUTPATIENT
Start: 2019-09-26 | End: 2019-09-27 | Stop reason: HOSPADM

## 2019-09-26 RX ORDER — FENTANYL CITRATE 50 UG/ML
25-50 INJECTION, SOLUTION INTRAMUSCULAR; INTRAVENOUS
Status: DISCONTINUED | OUTPATIENT
Start: 2019-09-26 | End: 2019-09-27 | Stop reason: HOSPADM

## 2019-09-26 RX ORDER — CYCLOPENTOLATE HYDROCHLORIDE 10 MG/ML
1 SOLUTION/ DROPS OPHTHALMIC
Status: COMPLETED | OUTPATIENT
Start: 2019-09-26 | End: 2019-09-26

## 2019-09-26 RX ORDER — BUPROPION HYDROCHLORIDE 300 MG/1
TABLET ORAL
Refills: 1 | COMMUNITY
Start: 2019-09-04 | End: 2022-12-28

## 2019-09-26 RX ORDER — MEPERIDINE HYDROCHLORIDE 25 MG/ML
12.5 INJECTION INTRAMUSCULAR; INTRAVENOUS; SUBCUTANEOUS
Status: DISCONTINUED | OUTPATIENT
Start: 2019-09-26 | End: 2019-09-27 | Stop reason: HOSPADM

## 2019-09-26 RX ORDER — LIDOCAINE 40 MG/G
CREAM TOPICAL
Status: DISCONTINUED | OUTPATIENT
Start: 2019-09-26 | End: 2019-09-27 | Stop reason: HOSPADM

## 2019-09-26 RX ORDER — MOXIFLOXACIN 5 MG/ML
SOLUTION/ DROPS OPHTHALMIC PRN
Status: DISCONTINUED | OUTPATIENT
Start: 2019-09-26 | End: 2019-09-26 | Stop reason: HOSPADM

## 2019-09-26 RX ORDER — ONDANSETRON 4 MG/1
4 TABLET, ORALLY DISINTEGRATING ORAL EVERY 30 MIN PRN
Status: DISCONTINUED | OUTPATIENT
Start: 2019-09-26 | End: 2019-09-27 | Stop reason: HOSPADM

## 2019-09-26 RX ORDER — TETRACAINE HYDROCHLORIDE 5 MG/ML
SOLUTION OPHTHALMIC PRN
Status: DISCONTINUED | OUTPATIENT
Start: 2019-09-26 | End: 2019-09-26 | Stop reason: HOSPADM

## 2019-09-26 RX ORDER — PROPARACAINE HYDROCHLORIDE 5 MG/ML
1 SOLUTION/ DROPS OPHTHALMIC
Status: COMPLETED | OUTPATIENT
Start: 2019-09-26 | End: 2019-09-26

## 2019-09-26 RX ORDER — ONDANSETRON 2 MG/ML
4 INJECTION INTRAMUSCULAR; INTRAVENOUS EVERY 30 MIN PRN
Status: DISCONTINUED | OUTPATIENT
Start: 2019-09-26 | End: 2019-09-27 | Stop reason: HOSPADM

## 2019-09-26 RX ORDER — SODIUM CHLORIDE, SODIUM LACTATE, POTASSIUM CHLORIDE, CALCIUM CHLORIDE 600; 310; 30; 20 MG/100ML; MG/100ML; MG/100ML; MG/100ML
500 INJECTION, SOLUTION INTRAVENOUS CONTINUOUS
Status: DISCONTINUED | OUTPATIENT
Start: 2019-09-26 | End: 2019-09-27 | Stop reason: HOSPADM

## 2019-09-26 RX ORDER — ACETAMINOPHEN 325 MG/1
975 TABLET ORAL ONCE
Status: COMPLETED | OUTPATIENT
Start: 2019-09-26 | End: 2019-09-26

## 2019-09-26 RX ORDER — KETAMINE HYDROCHLORIDE 10 MG/ML
INJECTION, SOLUTION INTRAMUSCULAR; INTRAVENOUS PRN
Status: DISCONTINUED | OUTPATIENT
Start: 2019-09-26 | End: 2019-09-26

## 2019-09-26 RX ADMIN — MOXIFLOXACIN 1 DROP: 5 SOLUTION/ DROPS OPHTHALMIC at 07:22

## 2019-09-26 RX ADMIN — KETOROLAC TROMETHAMINE 1 DROP: 5 SOLUTION OPHTHALMIC at 07:22

## 2019-09-26 RX ADMIN — KETAMINE HYDROCHLORIDE 10 MG: 10 INJECTION, SOLUTION INTRAMUSCULAR; INTRAVENOUS at 08:24

## 2019-09-26 RX ADMIN — PROPARACAINE HYDROCHLORIDE 1 DROP: 5 SOLUTION/ DROPS OPHTHALMIC at 07:15

## 2019-09-26 RX ADMIN — ACETAMINOPHEN 975 MG: 325 TABLET ORAL at 07:07

## 2019-09-26 RX ADMIN — PHENYLEPHRINE HYDROCHLORIDE 1 DROP: 25 SOLUTION/ DROPS OPHTHALMIC at 07:22

## 2019-09-26 RX ADMIN — PHENYLEPHRINE HYDROCHLORIDE 1 DROP: 25 SOLUTION/ DROPS OPHTHALMIC at 07:16

## 2019-09-26 RX ADMIN — CYCLOPENTOLATE HYDROCHLORIDE 1 DROP: 10 SOLUTION/ DROPS OPHTHALMIC at 07:29

## 2019-09-26 RX ADMIN — CYCLOPENTOLATE HYDROCHLORIDE 1 DROP: 10 SOLUTION/ DROPS OPHTHALMIC at 07:22

## 2019-09-26 RX ADMIN — KETOROLAC TROMETHAMINE 1 DROP: 5 SOLUTION OPHTHALMIC at 07:16

## 2019-09-26 RX ADMIN — CYCLOPENTOLATE HYDROCHLORIDE 1 DROP: 10 SOLUTION/ DROPS OPHTHALMIC at 07:16

## 2019-09-26 RX ADMIN — KETOROLAC TROMETHAMINE 1 DROP: 5 SOLUTION OPHTHALMIC at 07:29

## 2019-09-26 RX ADMIN — MOXIFLOXACIN 1 DROP: 5 SOLUTION/ DROPS OPHTHALMIC at 07:16

## 2019-09-26 RX ADMIN — MOXIFLOXACIN 1 DROP: 5 SOLUTION/ DROPS OPHTHALMIC at 07:29

## 2019-09-26 RX ADMIN — SODIUM CHLORIDE, SODIUM LACTATE, POTASSIUM CHLORIDE, CALCIUM CHLORIDE 500 ML: 600; 310; 30; 20 INJECTION, SOLUTION INTRAVENOUS at 07:24

## 2019-09-26 RX ADMIN — PHENYLEPHRINE HYDROCHLORIDE 1 DROP: 25 SOLUTION/ DROPS OPHTHALMIC at 07:29

## 2019-09-26 NOTE — ANESTHESIA CARE TRANSFER NOTE
Patient: Holly Muir    Procedure(s):  LEFT PHACOEMULSIFICATION, CATARACT, WITH STANDARD IOL INSERTION    Diagnosis: LEFT CATARACT  Diagnosis Additional Information: No value filed.    Anesthesia Type:   MAC     Note:  Airway :Room Air  Patient transferred to:Phase II  Handoff Report: Identifed the Patient, Identified the Reponsible Provider, Reviewed the pertinent medical history, Discussed the surgical course, Reviewed Intra-OP anesthesia mangement and issues during anesthesia, Set expectations for post-procedure period and Allowed opportunity for questions and acknowledgement of understanding      Vitals: (Last set prior to Anesthesia Care Transfer)    CRNA VITALS  9/26/2019 0807 - 9/26/2019 0852      9/26/2019             Pulse:  81    SpO2:  100 %                Electronically Signed By: KASIE Reyes CRNA  September 26, 2019  8:52 AM

## 2019-09-26 NOTE — DISCHARGE INSTRUCTIONS
Holly Muir    Cataract Surgery Postoperative Instructions    Postoperative Medications: After surgery, you will use several different eye drop  medications. In most cases you will start these eye drops 2 days before surgery.    1. Ocuflox - is an antibiotic drop that is used to minimize the risk of infection. It should be used 4 times daily for 10 days total or until you are told to discontinue.    (Acceptable alternatives to Ocuflox include: Zymaxid, Besivance, Gatafloxacin, Vigamox)     2.  Ketorolac - is an anti-inflammatory drop. Use it 4 time daily for 21 days total or until you are told to discontinue.  (Acceptable alternatives to Ketorolac include: Acuvail, Nevenac, Xibrom)    3. Prednisilone - is a steroid eye drop, used to minimize inflammation and modulate  healing. It should be used 4 times daily for 21 days total or until you are told to discontinue.  (Acceptable alternatives for Prednisilone include: Pred Forte, Omnipred, Econopred)      IF YOU GET AN ALTERNATIVE EYE DROP PLEASE FOLLOW THE DIRECTIONS ON THE BOTTLE OF DROPS. THEY WILL BE DIFFERENT!      The drops might sting a little when they are instilled, and that is normal.    It doesn t matter what order you put the drops into your eyes, but you should wait at least one minute between drops.    Please continue any glaucoma, dry eye, or other medications you were using prior to the surgery.    Please allow 24 to 48 hours when requesting refills, and call BEFORE you run out of drops.      Artificial Tears - are lubricating drops used to moisturize the eye. You can use these as much as you want, particularly if your eyes feel watery, gritty, or uncomfortable. Chilling these drops in the refrigerator results in a more soothing feeling. There are several brands of artificial tears available including, but not limited to: Optive, Refresh, Systane, Blink, Genteal, Soothe, and others. You should not use drops that  get the red out . You do not  need a prescription for these medications.      Restriction on Activities - It is extremely important that you DO NOT RUB THE  TREATED EYE.  - You will be given a clear plastic shield to wear as protection over your eye the  night after surgery.  - Refrain from any activities that may put your eye at risk of injury, as well as areas  containing a high volume of chemicals, dust, and debris.  - Do Not wear any eye makeup or moisturizer around the eye for 1 week after  surgery.  - Do Not swim or go into a hot-tub, Jacuzzi, or sauna for 1 week after surgery. You  can take showers as normal, but avoid getting shampoo or soap in your eyes.  - Avoid strenuous activity, including lifting more than 30 lbs, for 1 week after  surgery.  - It is fine to bathe, read, watch TV, and use the computer.  Symptoms requiring medical attention:  - Sudden onset of increased discharge from the eye  - Persistent or increasing pain in the eye  - Sudden decrease in vision  - Persistent nausea or vomiting    If you have any questions or concerns before or after your surgery, please contact:    Dr. Winn s office at [phone]  [facility]  Same-Day Surgery   Adult Discharge Orders & Instructions   For 24 hours after surgery  1. Get plenty of rest.  A responsible adult must stay with you for at least 24 hours after you leave the hospital.   2. Do not drive or use heavy equipment.  If you have weakness or tingling, don't drive or use heavy equipment until this feeling goes away.  3. Do not drink alcohol.  4. Avoid strenuous or risky activities.  Ask for help when climbing stairs.   5. You may feel lightheaded.  IF so, sit for a few minutes before standing.  Have someone help you get up.   6. If you have nausea (feel sick to your stomach): Drink only clear liquids such as apple juice, ginger ale, broth or 7-Up.  Rest may also help.  Be sure to drink enough fluids.  Move to a regular diet as you feel able.  7. You  may have a slight fever. Call the doctor if your fever is over 100 F (37.7 C) (taken under the tongue) or lasts longer than 24 hours.  8. You may have a dry mouth, a sore throat, muscle aches or trouble sleeping.  These should go away after 24 hours.  9. Do not make important or legal decisions.   Call your doctor for any of the followin.  Signs of infection (fever, growing tenderness at the surgery site, a large amount of drainage or bleeding, severe pain, foul-smelling drainage, redness, swelling).    2. It has been over 8 to 10 hours since surgery and you are still not able to urinate (pass water).    3.  Headache for over 24 hours.  Tylenol 975 mg given at 0700

## 2019-09-26 NOTE — OP NOTE
PATIENT NAME:  Holly Muir    :  1955    PATIENT NUMBER:  [id number]    DATE OF SURGERY:  2019    SURGEON:  Francesco Winn MD, M.D.    PREOPERATIVE DIAGNOSIS: Cataract left eye.    POSTOPERATIVE DIAGNOSIS:  Same    PROCEDURE PERFORMED:    1. Phacoemulsification with posterior chamber intraocular lens left eye.    ANESTHESIA:  Topical/MAC    COMPLICATIONS:  None    PROCEDURE: Following adequate preoperative dilation the patient was given topical anesthesia consisting of Proparacaine.  The patient was brought to the operative suite where the eye was prepped and draped in the usual sterile fashion.  A lid speculum was applied. A super sharp blade was used to create a paracentesis, through which 1% preservative free Lidocaine was injected.  Visoelastic was then used to inflate the anterior chamber.  A biplanar incision at the clear cornea limbus was created with a keratome.  A continuous curvilinear capsulorrhexis was started with a cystitome and completed using Utrata forceps.  The lens was hydrodissected and hydro delineated using BSS on a cannula.  The lens nucleus was removed using phacoemulsification.  Remaining cortex was removed using irrigation and aspiration.  Viscoelastic was injected to inflate the capsular bag and a 24.0 D ZCB00 IOL was inserted into the capsular bag without difficulty.  Residual viscoelastic and provisc material was removed with irrigation and aspiration.  BSS was used to hydrate the corneal incision and paracentesis sites which were checked and noted to be watertight.  A drop of Vigamox was applied to the eye and a clear plastic shield was placed.  The patient tolerated the procedure well and left the operative suite in stable condition.    Francesco Winn M.D.

## 2019-09-26 NOTE — ANESTHESIA POSTPROCEDURE EVALUATION
Anesthesia POST Procedure Evaluation    Patient: Holly Muir   MRN:     [id number] Gender:   female   Age:    64 year old :      1955        Preoperative Diagnosis: LEFT CATARACT   Procedure(s):  LEFT PHACOEMULSIFICATION, CATARACT, WITH STANDARD IOL INSERTION   Postop Comments: No value filed.       Anesthesia Type:  Not documented  MAC    Reportable Event: NO     PAIN: Uncomplicated   Sign Out status: Comfortable, Well controlled pain     PONV: No PONV   Sign Out status:  No Nausea or Vomiting     Neuro/Psych: Uneventful perioperative course   Sign Out Status: Preoperative baseline; Age appropriate mentation     Airway/Resp.: Uneventful perioperative course   Sign Out Status: Non labored breathing, age appropriate RR; Resp. Status within EXPECTED Parameters     CV: Uneventful perioperative course   Sign Out status: Appropriate BP and perfusion indices; Appropriate HR/Rhythm     Disposition:   Sign Out in:  PACU  Disposition:  Phase II; Home  Recovery Course: Uneventful  Follow-Up: Not required           Last Anesthesia Record Vitals:  CRNA VITALS  2019 0807 - 2019 0907      2019             Pulse:  81    SpO2:  100 %          Last PACU Vitals:  Vitals Value Taken Time   /59 2019  8:40 AM   Temp     Pulse     Resp 16 2019  8:40 AM   SpO2 99 % 2019  8:40 AM   Temp src Available 2019  8:30 AM   NIBP 131/67 2019  8:34 AM   Pulse 81 2019  8:37 AM   SpO2 100 % 2019  8:37 AM   Resp     Temp     Ht Rate     Temp 2           Electronically Signed By: Nemesio Lewis MD, 2019, 2:49 PM

## 2019-09-26 NOTE — ANESTHESIA PREPROCEDURE EVALUATION
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      Past Surgical History:   Procedure Laterality Date     ARTHROSCOPY KNEE RT/LT      LT     BLEPHAROPLASTY BILATERAL Bilateral 2019    Procedure: BILATERAL UPPER LID BLEPHAROPLASTY;  Surgeon: Randolph Cook MD;  Location: [facility]     BREAST LUMPECTOMY, RT/LT      LT-benign      C EXCIS PTERYGIUM  10/08    [facility]     C STOMACH SURGERY PROCEDURE UNLISTED       COLONOSCOPY  5/10/2012    Procedure:COLONOSCOPY; screening colonoscopy; Surgeon:MILLA VEGA; Location:[facility] OR     COLONOSCOPY WITH CO2 INSUFFLATION N/A 2019    Procedure: Colonoscopy with CO2 insufflation;  Surgeon: Himanshu Hi MD;  Location: [facility] OR     COLOSTOMY       COLOSTOMY       HC COLONOSCOPY THRU STOMA, DIAGNOSTIC      normal per report-no records available-records destroyed     HYSTERECTOMY, PAP NO LONGER INDICATED      done in [place]-cervical cancer     IMPLANT STIMULATOR SACRAL NERVE STAGE ONE Right 3/10/2015    Procedure: IMPLANT STIMULATOR SACRAL NERVE STAGE ONE;  Surgeon: Teodora Yusuf MD;  Location: [facility]     IMPLANT STIMULATOR SACRAL NERVE STAGE TWO Right 3/31/2015    Procedure: IMPLANT STIMULATOR SACRAL NERVE STAGE TWO;  Surgeon: Teodora Yusuf MD;  Location: [facility]     REPAIR PTOSIS Bilateral 2019     SALPINGO OOPHORECTOMY,R/L/JENNIFER       Salpingo Oophorectomy, RT/LT/JENNIFER          Anesthesia Evaluation     .             ROS/MED HX    ENT/Pulmonary:  - neg pulmonary ROS     Neurologic:  - neg neurologic ROS     Cardiovascular:  - neg cardiovascular ROS   (+) Dyslipidemia, hypertension----. : . . . :. .       METS/Exercise Tolerance:     Hematologic:  - neg hematologic  ROS       Musculoskeletal:  - neg musculoskeletal ROS (+) arthritis,  -       GI/Hepatic:  - neg GI/hepatic ROS   (+) GERD       Renal/Genitourinary: Comment: Diabetic nephropathy - ROS Renal section negative       Endo:  - neg endo ROS   (+) type II DM Obesity, .      Psychiatric: Comment: oCD - neg psychiatric ROS   (+) psychiatric history schizophrenia      Infectious Disease:  - neg infectious disease ROS       Malignancy:      - no malignancy   Other:    (+) H/O Chronic Pain,  - neg other ROS                     PHYSICAL EXAM:   Mental Status/Neuro: A/A/O   Airway: Facies: Feasible  Mallampati: I  Mouth/Opening: Full  TM distance: > 6 cm  Neck ROM: Full   Respiratory: Auscultation: CTAB     Resp. Rate: Normal     Resp. Effort: Normal      CV: Rhythm: Regular  Rate: Age appropriate  Heart: Normal Sounds  Edema: None   Comments:      Dental: Normal Dentition                LABS:  CBC:   Lab Results   Component Value Date    WBC 5.7 07/29/2019    WBC 6.2 10/13/2011    HGB 11.8 07/29/2019    HGB 13.5 10/13/2011    HCT 36.9 07/29/2019    HCT 40.3 10/13/2011     07/29/2019     10/13/2011     BMP:   Lab Results   Component Value Date     07/29/2019     (L) 08/08/2018    POTASSIUM 3.9 07/29/2019    POTASSIUM 3.9 08/08/2018    CHLORIDE 103 07/29/2019    CHLORIDE 98 08/08/2018    CO2 29 07/29/2019    CO2 27 08/08/2018    BUN 12 07/29/2019    BUN 13 08/08/2018    CR 0.72 07/29/2019    CR 0.74 08/08/2018     (H) 07/29/2019     (H) 08/08/2018     COAGS: No results found for: PTT, INR, FIBR  POC:   Lab Results   Component Value Date     (H)  "09/26/2019     OTHER:   Lab Results   Component Value Date    A1C 8.3 (H) 07/29/2019    NICOLÁS 9.3 07/29/2019    PHOS 4.8 (H) 02/03/2015    ALBUMIN 3.6 12/19/2016    PROTTOTAL 7.6 12/19/2016    ALT 58 (H) 12/19/2016    AST 29 12/19/2016    ALKPHOS 101 12/19/2016    BILITOTAL 0.3 12/19/2016    TSH 5.29 (H) 08/08/2018    T4 1.08 08/08/2018        Preop Vitals    BP Readings from Last 3 Encounters:   09/26/19 139/68   09/20/19 120/73   09/06/19 105/65    Pulse Readings from Last 3 Encounters:   09/26/19 81   09/20/19 72   09/06/19 73      Resp Readings from Last 3 Encounters:   09/26/19 18   08/16/19 16   08/02/19 16    SpO2 Readings from Last 3 Encounters:   09/26/19 98%   09/20/19 100%   09/06/19 98%      Temp Readings from Last 1 Encounters:   09/26/19 36.2  C (97.2  F) (Oral)    Ht Readings from Last 1 Encounters:   09/06/19 1.581 m (5' 2.25\")      Wt Readings from Last 1 Encounters:   09/06/19 95.7 kg (210 lb 14.4 oz)    Estimated body mass index is 38.26 kg/m  as calculated from the following:    Height as of 9/6/19: 1.581 m (5' 2.25\").    Weight as of 9/6/19: 95.7 kg (210 lb 14.4 oz).     LDA:  Peripheral IV 03/10/15 Right Hand (Active)   Number of days: 1661       Peripheral IV 09/26/19 Left Hand (Active)   Site Assessment WDL 9/26/2019  7:15 AM   Line Status Infusing 9/26/2019  7:15 AM   Number of days: 0        Assessment:   ASA SCORE: 3    H&P: History and physical reviewed and following examination; no interval change.   Smoking Status:  Non-Smoker/Unknown   NPO Status: NPO Appropriate     Plan:   Anes. Type:  MAC   Pre-Medication: None   Induction:  N/a   Airway: Native Airway   Access/Monitoring: PIV   Maintenance: N/a     Postop Plan:   Postop Pain: None  Postop Sedation/Airway: Not planned  Disposition: Outpatient     PONV Management:   Adult Risk Factors: Female, Non-Smoker   Prevention: Ondansetron     CONSENT: Direct conversation   Plan and risks discussed with: Patient   Blood Products: Consent " Deferred (Minimal Blood Loss)                   Nemesio Lewis MD

## 2019-09-27 ENCOUNTER — OFFICE VISIT (OUTPATIENT)
Dept: OPTOMETRY | Facility: CLINIC | Age: 64
End: 2019-09-27
Payer: MEDICARE

## 2019-09-27 DIAGNOSIS — Z96.1 PSEUDOPHAKIA OF LEFT EYE: Primary | ICD-10-CM

## 2019-09-27 PROCEDURE — 99024 POSTOP FOLLOW-UP VISIT: CPT | Performed by: OPTOMETRIST

## 2019-09-27 RX ORDER — OFLOXACIN 3 MG/ML
1 SOLUTION/ DROPS OPHTHALMIC 4 TIMES DAILY
Qty: 1 BOTTLE | Refills: 1 | Status: CANCELLED | OUTPATIENT
Start: 2019-09-27

## 2019-09-27 RX ORDER — KETOROLAC TROMETHAMINE 5 MG/ML
1 SOLUTION OPHTHALMIC 4 TIMES DAILY
Qty: 1 BOTTLE | Refills: 1 | Status: CANCELLED | OUTPATIENT
Start: 2019-09-27

## 2019-09-27 RX ORDER — PREDNISOLONE ACETATE 10 MG/ML
1 SUSPENSION/ DROPS OPHTHALMIC 4 TIMES DAILY
Qty: 1 BOTTLE | Refills: 1 | Status: CANCELLED | OUTPATIENT
Start: 2019-09-27

## 2019-09-27 ASSESSMENT — REFRACTION_WEARINGRX
OS_VPRISM: 1.5
OS_SPHERE: +0.50
OS_SPHERE: +0.50
OS_AXIS: 180
OD_AXIS: 015
OD_CYLINDER: +0.50
OS_CYLINDER: +1.00
OS_VBASE: UP
SPECS_TYPE: BIFOCAL
OD_ADD: +2.50
OD_VPRISM: 1.5
OD_SPHERE: +1.00
OD_VBASE: DOWN
OS_CYLINDER: +1.00
OD_CYLINDER: +0.50
OD_AXIS: 026
OS_AXIS: 180
OS_ADD: +2.50
OD_ADD: +2.50
OD_SPHERE: +1.00

## 2019-09-27 ASSESSMENT — TONOMETRY
IOP_METHOD: ICARE
OS_IOP_MMHG: 11

## 2019-09-27 ASSESSMENT — VISUAL ACUITY
OS_SC+: -1
OS_SC: 20/30
OD_SC+: +2
METHOD: SNELLEN - LINEAR
OD_SC: 20/50

## 2019-09-27 ASSESSMENT — EXTERNAL EXAM - LEFT EYE: OS_EXAM: NORMAL

## 2019-09-27 ASSESSMENT — SLIT LAMP EXAM - LIDS: COMMENTS: NORMAL

## 2019-09-27 NOTE — PROGRESS NOTES
CHIEF COMPLAINT:   Chief Complaint   Patient presents with     Cataract Follow-Up     1 day post op LEFT PHACOEMULSIFICATION, CATARACT, WITH STANDARD IOL INSERTION       Type of surgery cataract  Date of surgery 7/26/2019 - right 9/24/2019    No complaints - requested refills on drops    Drops reviewed.    OBJECTIVE:     See ophthalmology exam    ASSESSMENT:         ICD-10-CM    1. Pseudophakia of left eye Z96.1      PLAN:      Patient Instructions   Continue with drops and scheduled follow up appointments.  Follow directions on your bottles as far as how many drops to use daily.    Wear shield while sleeping.    Contact your pharmacy to refill drops if needed.       Please continue any glaucoma, dry eye, or other medications you were using prior to the surgery.        Ariel Greene, ANNI  [facility]  [address] [place] [address]  Tel- [phone]  Nog-[phone]

## 2019-09-27 NOTE — PATIENT INSTRUCTIONS
Continue with drops and scheduled follow up appointments.  Follow directions on your bottles as far as how many drops to use daily.    Wear shield while sleeping.    Contact your pharmacy to refill drops if needed.       Please continue any glaucoma, dry eye, or other medications you were using prior to the surgery.        Ariel Gerene, OD  [facility]  [address] [place] [address]  Tel- [phone]  [phone]

## 2019-10-01 DIAGNOSIS — M25.561 ARTHRALGIA OF BOTH KNEES: ICD-10-CM

## 2019-10-01 DIAGNOSIS — M25.562 ARTHRALGIA OF BOTH KNEES: ICD-10-CM

## 2019-10-01 DIAGNOSIS — M17.10 ARTHRITIS OF KNEE: ICD-10-CM

## 2019-10-01 RX ORDER — TRAMADOL HYDROCHLORIDE 50 MG/1
TABLET ORAL
Qty: 45 TABLET | Refills: 0 | Status: CANCELLED | OUTPATIENT
Start: 2019-10-01

## 2019-10-01 NOTE — TELEPHONE ENCOUNTER
[facility] Call Center    Phone Message    May a detailed message be left on voicemail: yes    Reason for Call: Medication Refill Request    Has the patient contacted the pharmacy for the refill? Yes   Name of medication being requested: Tramadol   Provider who prescribed the medication: Dr. King  Pharmacy: [facility] in [place]  Date medication is needed: asap         Action Taken: Message routed to:  Primary Care p [phone]

## 2019-10-01 NOTE — TELEPHONE ENCOUNTER
Pending Prescriptions:                       Disp   Refills    traMADol (ULTRAM) 50 MG tablet            45 tab*0          Rx last filled 9/25/19 for #45 with 0 refills. Patient should have refills remaining. [facility]. Pharmacy states Rx ready for patient .    Called patient on cell number to inform.  Bere ESTEBAN, CMA

## 2019-10-04 ENCOUNTER — OFFICE VISIT (OUTPATIENT)
Dept: OPTOMETRY | Facility: CLINIC | Age: 64
End: 2019-10-04
Payer: MEDICARE

## 2019-10-04 DIAGNOSIS — Z96.1 PSEUDOPHAKIA OF LEFT EYE: Primary | ICD-10-CM

## 2019-10-04 DIAGNOSIS — F33.3 SEVERE RECURRENT MAJOR DEPRESSIVE DISORDER WITH PSYCHOTIC FEATURES (H): ICD-10-CM

## 2019-10-04 PROCEDURE — 99024 POSTOP FOLLOW-UP VISIT: CPT | Performed by: OPTOMETRIST

## 2019-10-04 RX ORDER — CHOLECALCIFEROL (VITAMIN D3) 50 MCG
TABLET ORAL
Qty: 100 TABLET | Refills: 3 | Status: SHIPPED | OUTPATIENT
Start: 2019-10-04 | End: 2020-11-09

## 2019-10-04 ASSESSMENT — TONOMETRY
OS_IOP_MMHG: 18
IOP_METHOD: ICARE

## 2019-10-04 ASSESSMENT — EXTERNAL EXAM - LEFT EYE: OS_EXAM: NORMAL

## 2019-10-04 ASSESSMENT — REFRACTION_WEARINGRX
OS_SPHERE: +0.50
OS_CYLINDER: +1.00
OS_ADD: +2.50
OD_VBASE: DOWN
OD_CYLINDER: +0.50
OD_SPHERE: +1.00
OD_AXIS: 015
OD_VPRISM: 1.5
OS_AXIS: 180
OS_VPRISM: 1.5
OS_VBASE: UP
OD_ADD: +2.50

## 2019-10-04 ASSESSMENT — VISUAL ACUITY
OS_SC+: -2
OD_SC+: -2
OD_SC: 20/30
OS_SC: 20/20
METHOD: SNELLEN - LINEAR

## 2019-10-04 ASSESSMENT — SLIT LAMP EXAM - LIDS: COMMENTS: NORMAL

## 2019-10-04 NOTE — PROGRESS NOTES
CHIEF COMPLAINT:   Chief Complaint   Patient presents with     Cataract Follow-Up     1 week post op LEFT PHACOEMULSIFICATION, CATARACT, WITH STANDARD IOL INSERTION       Type of surgery cataract  Date of surgery 7/26/2019 - right 9/24/2019    Pt see a line on the outer corner of her left eye.    Drops reviewed. Pt is using acular, ocuflox and pred forte 4 times a day. Last time drops used this morning around 8    OBJECTIVE:     See ophthalmology exam    ASSESSMENT:         ICD-10-CM    1. Pseudophakia of left eye Z96.1 POST-OP FOLLOW-UP VISIT     PLAN:      Patient Instructions   Discontinue ofloxacin.  Continue prednisolone acetate and ketorolac for the full 21 days.    Ok to discontinue shield while sleeping.  All restrictions are lifted.    Keep follow up appointments as scheduled.    Contact your pharmacy to refill drops if needed.    Start all 3 drops on surgical eye 2 days before surgery.         Please continue any glaucoma, dry eye, or other medications you were using prior to the surgery.        Ariel Greene, ANNI  [facility]  [address] [place] [address]  Tel- [phone]

## 2019-10-04 NOTE — PATIENT INSTRUCTIONS
Discontinue ofloxacin.  Continue prednisolone acetate and ketorolac for the full 21 days.    Ok to discontinue shield while sleeping.  All restrictions are lifted.    Keep follow up appointments as scheduled.    Contact your pharmacy to refill drops if needed.    Start all 3 drops on surgical eye 2 days before surgery.         Please continue any glaucoma, dry eye, or other medications you were using prior to the surgery.        Ariel Greene, OD  [facility]  [address] [place] [address]  Tel- [phone]

## 2019-10-07 ENCOUNTER — OFFICE VISIT (OUTPATIENT)
Dept: ORTHOPEDICS | Facility: CLINIC | Age: 64
End: 2019-10-07
Payer: MEDICARE

## 2019-10-07 VITALS
SYSTOLIC BLOOD PRESSURE: 120 MMHG | HEIGHT: 62 IN | DIASTOLIC BLOOD PRESSURE: 74 MMHG | BODY MASS INDEX: 38.64 KG/M2 | WEIGHT: 210 LBS | HEART RATE: 75 BPM

## 2019-10-07 DIAGNOSIS — M17.12 PRIMARY OSTEOARTHRITIS OF LEFT KNEE: Primary | ICD-10-CM

## 2019-10-07 DIAGNOSIS — M17.11 PRIMARY OSTEOARTHRITIS OF RIGHT KNEE: ICD-10-CM

## 2019-10-07 PROCEDURE — 20610 DRAIN/INJ JOINT/BURSA W/O US: CPT | Mod: 50 | Performed by: FAMILY MEDICINE

## 2019-10-07 PROCEDURE — 99207 ZZC DROP WITH A PROCEDURE: CPT | Performed by: FAMILY MEDICINE

## 2019-10-07 RX ORDER — TRIAMCINOLONE ACETONIDE 40 MG/ML
40 INJECTION, SUSPENSION INTRA-ARTICULAR; INTRAMUSCULAR
Status: DISCONTINUED | OUTPATIENT
Start: 2019-10-07 | End: 2022-09-14

## 2019-10-07 RX ADMIN — TRIAMCINOLONE ACETONIDE 40 MG: 40 INJECTION, SUSPENSION INTRA-ARTICULAR; INTRAMUSCULAR at 16:16

## 2019-10-07 ASSESSMENT — MIFFLIN-ST. JEOR: SCORE: 1460.8

## 2019-10-07 ASSESSMENT — PAIN SCALES - GENERAL: PAINLEVEL: EXTREME PAIN (8)

## 2019-10-07 NOTE — PROGRESS NOTES
"HISTORY OF PRESENT ILLNESS  Ms. Muir is a pleasant 64 year old year old female following up with bilateral knee pain.  Holly last saw me in July, at that visit she was experiencing an osteoarthritic flare.  She was treated conservatively.  Previous to that I injected her knees 5 months ago.  She is interested in repeat injections today.  Her left knee is worse than her right, she points to the anterior medial portion of each knee.  Worse with walking, it is very difficult for her to climb stairs.  She is going to [place] in a month on vacation.     PHYSICAL EXAM  Vitals:    10/07/19 1538   BP: 120/74   Pulse: 75   Weight: 95.3 kg (210 lb)   Height: 1.575 m (5' 2\")     General  - normal appearance, in no obvious distress  CV  - normal popliteal pulse  Pulm  - normal respiratory pattern, non-labored  Musculoskeletal - right and left knee  - stance: mildly antalgic gait, genu varum bilaterally  - inspection: generalized swelling, trace effusion bilaterally, L > R  - palpation: medial joint line tenderness bilaterally  - ROM: 100 degrees flexion, 0 degrees extension, painful active ROM    Neuro  - no sensory or motor deficit, grossly normal coordination, normal muscle tone  Skin  - no ecchymosis, erythema, warmth, or induration, no obvious rash  Psych  - interactive, appropriate, normal mood and affect          ASSESSMENT & PLAN  Ms. Muir is a 64 year old year old female following up with bilateral knee pain secondary to osteoarthritis.    I did repeat her knee injections today (see procedure note).    I also had a discussion centering around the role of surgical intervention, likely knee replacement.  She is not interested in this at the moment.  This is something that we can continue to explore, if necessary.    It was a pleasure seeing Holly.    PROCEDURE    Knee Injection - Intraarticular  The patient was informed of the risks and the benefits of the procedure and a written consent was signed.  The patient s " "left knee was prepped with chlorhexidine in sterile fashion.   40 mg of triamcinolone suspension was drawn up into a 5 mL syringe with 4 mL of 1% lidocaine.  Injection was performed using substerile technique.  A 1.5-inch 25-gauge needle was used to enter the lateral aspect of the left knee.  Injection performed successfully without difficulty.  There were no complications. The patient tolerated the procedure well. There was negligible bleeding.   The patient s right knee was prepped with chlorhexidine in sterile fashion.   40 mg of triamcinolone suspension was drawn up into a 5 mL syringe with 4 mL of 1% lidocaine.  Injection was performed using substerile technique.  A 1.5-inch 25-gauge needle was used to enter the lateral aspect of the right knee.  Injection performed successfully without difficulty.  There were no complications. The patient tolerated the procedure well. There was negligible bleeding.   The patient was instructed to ice the knee upon leaving clinic and refrain from overuse over the next 3 days.   The patient was instructed to call or go to the emergency room with any unusual pain, swelling, redness, or if otherwise concerned.          Santy Harrington DO, LANA      This note was constructed using Dragon dictation software, please excuse any minor errors in spelling, grammar, or syntax.        [facility] FOLLOW-UP VISIT 10/7/2019    Holly Muir's chief complaint for this visit includes:  Chief Complaint   Patient presents with     RECHECK     f/u left knee, increased pain with stairs up and down,      PCP: Tiffany King    Referring Provider:  No referring provider defined for this encounter.    /74   Pulse 75   Ht 1.575 m (5' 2\")   Wt 95.3 kg (210 lb)   BMI 38.41 kg/m    Extreme Pain (8)       Interval History:     Follow up reason: left knee    Pain: Worsening    Function: Worsening    Medical History:    Any recent changes to your medical history? No    Any new " medication prescribed since last visit? No    Review of Systems:    Do you have fever, chills, weight loss? No    Do you have any vision problems? No    Do you have any chest pain or edema? No    Do you have any shortness of breath or wheezing?  No    Do you have stomach problems? No    Do you have any numbness or focal weakness? No    Do you have diabetes? Yes,     Do you have problems with bleeding or clotting? No    Do you have an rashes or other skin lesions? No    Large Joint Injection/Arthocentesis: bilateral knee  Date/Time: 10/7/2019 4:16 PM  Performed by: aSnty Harrington DO  Authorized by: Santy Harrington DO     Indications:  Pain  Needle Size:  25 G  Approach:  Lateral  Location:  Knee  Laterality:  Bilateral      Medications (Right):  40 mg triamcinolone 40 MG/ML  Medications (Left):  40 mg triamcinolone 40 MG/ML  Outcome:  Tolerated well, no immediate complications  Procedure discussed: discussed risks, benefits, and alternatives    Consent Given by:  Patient  Timeout: timeout called immediately prior to procedure    Prep: patient was prepped and draped in usual sterile fashion

## 2019-10-07 NOTE — LETTER
"    10/7/2019         RE: Holly Muir  [id number] [facility] [id number]        Dear Colleague,    Thank you for referring your patient, Holly Muir, to the [facility]. Please see a copy of my visit note below.    HISTORY OF PRESENT ILLNESS  Ms. Muir is a pleasant 64 year old year old female following up with bilateral knee pain.  Holly last saw me in July, at that visit she was experiencing an osteoarthritic flare.  She was treated conservatively.  Previous to that I injected her knees 5 months ago.  She is interested in repeat injections today.  Her left knee is worse than her right, she points to the anterior medial portion of each knee.  Worse with walking, it is very difficult for her to climb stairs.  She is going to [place] in a month on vacation.     PHYSICAL EXAM  Vitals:    10/07/19 1538   BP: 120/74   Pulse: 75   Weight: 95.3 kg (210 lb)   Height: 1.575 m (5' 2\")     General  - normal appearance, in no obvious distress  CV  - normal popliteal pulse  Pulm  - normal respiratory pattern, non-labored  Musculoskeletal - right and left knee  - stance: mildly antalgic gait, genu varum bilaterally  - inspection: generalized swelling, trace effusion bilaterally, L > R  - palpation: medial joint line tenderness bilaterally  - ROM: 100 degrees flexion, 0 degrees extension, painful active ROM    Neuro  - no sensory or motor deficit, grossly normal coordination, normal muscle tone  Skin  - no ecchymosis, erythema, warmth, or induration, no obvious rash  Psych  - interactive, appropriate, normal mood and affect          ASSESSMENT & PLAN  Ms. Muir is a 64 year old year old female following up with bilateral knee pain secondary to osteoarthritis.    I did repeat her knee injections today (see procedure note).    I also had a discussion centering around the role of surgical intervention, likely knee replacement.  She is not interested in this at the moment.  This is " something that we can continue to explore, if necessary.    It was a pleasure seeing Holly.    PROCEDURE    Knee Injection - Intraarticular  The patient was informed of the risks and the benefits of the procedure and a written consent was signed.  The patient s left knee was prepped with chlorhexidine in sterile fashion.   40 mg of triamcinolone suspension was drawn up into a 5 mL syringe with 4 mL of 1% lidocaine.  Injection was performed using substerile technique.  A 1.5-inch 25-gauge needle was used to enter the lateral aspect of the left knee.  Injection performed successfully without difficulty.  There were no complications. The patient tolerated the procedure well. There was negligible bleeding.   The patient s right knee was prepped with chlorhexidine in sterile fashion.   40 mg of triamcinolone suspension was drawn up into a 5 mL syringe with 4 mL of 1% lidocaine.  Injection was performed using substerile technique.  A 1.5-inch 25-gauge needle was used to enter the lateral aspect of the right knee.  Injection performed successfully without difficulty.  There were no complications. The patient tolerated the procedure well. There was negligible bleeding.   The patient was instructed to ice the knee upon leaving clinic and refrain from overuse over the next 3 days.   The patient was instructed to call or go to the emergency room with any unusual pain, swelling, redness, or if otherwise concerned.          Santy Harrington DO, CAQSM      This note was constructed using Dragon dictation software, please excuse any minor errors in spelling, grammar, or syntax.        [facility] FOLLOW-UP VISIT 10/7/2019    Holly Muir's chief complaint for this visit includes:  Chief Complaint   Patient presents with     RECHECK     f/u left knee, increased pain with stairs up and down,      PCP: Tiffany King    Referring Provider:  No referring provider defined for this encounter.    /74   Pulse 75   Ht  "1.575 m (5' 2\")   Wt 95.3 kg (210 lb)   BMI 38.41 kg/m     Extreme Pain (8)       Interval History:     Follow up reason: left knee    Pain: Worsening    Function: Worsening    Medical History:    Any recent changes to your medical history? No    Any new medication prescribed since last visit? No    Review of Systems:    Do you have fever, chills, weight loss? No    Do you have any vision problems? No    Do you have any chest pain or edema? No    Do you have any shortness of breath or wheezing?  No    Do you have stomach problems? No    Do you have any numbness or focal weakness? No    Do you have diabetes? Yes,     Do you have problems with bleeding or clotting? No    Do you have an rashes or other skin lesions? No    Large Joint Injection/Arthocentesis: bilateral knee  Date/Time: 10/7/2019 4:16 PM  Performed by: Santy Harrington DO  Authorized by: Santy Harrington DO     Indications:  Pain  Needle Size:  25 G  Approach:  Lateral  Location:  Knee  Laterality:  Bilateral      Medications (Right):  40 mg triamcinolone 40 MG/ML  Medications (Left):  40 mg triamcinolone 40 MG/ML  Outcome:  Tolerated well, no immediate complications  Procedure discussed: discussed risks, benefits, and alternatives    Consent Given by:  Patient  Timeout: timeout called immediately prior to procedure    Prep: patient was prepped and draped in usual sterile fashion            Again, thank you for allowing me to participate in the care of your patient.        Sincerely,        Santy Harrington DO    "

## 2019-10-08 ENCOUNTER — TELEPHONE (OUTPATIENT)
Dept: PEDIATRICS | Facility: CLINIC | Age: 64
End: 2019-10-08

## 2019-10-08 NOTE — TELEPHONE ENCOUNTER
HC RN calls states Wellbutrin was changed on 9/10 by [facility]. Adjusted med rec.  Asya Howell RN    Wheelchair/Stroller

## 2019-10-11 ENCOUNTER — MEDICAL CORRESPONDENCE (OUTPATIENT)
Dept: HEALTH INFORMATION MANAGEMENT | Facility: CLINIC | Age: 64
End: 2019-10-11

## 2019-10-11 ENCOUNTER — TELEPHONE (OUTPATIENT)
Dept: ENDOCRINOLOGY | Facility: CLINIC | Age: 64
End: 2019-10-11

## 2019-10-11 NOTE — TELEPHONE ENCOUNTER
----- Message from Hilda Bloom, RN sent at 5/10/2019  2:01 PM CDT -----  Regarding: FW: home visitng nurse      ----- Message -----  From: June Tellez RN  Sent: 5/10/2019  12:46 PM  To: [facility] Endocrinology Adult [place]  Subject: FW: home visitng nurse                               ----- Message -----  From: Elizabeth Medrano MD  Sent: 5/10/2019  11:44 AM  To: Mikayla Sellers, RN, [facility]  Subject: home visitng nurse                               Hi    Could you help to try increasing from once a week to twice week home visitng alise?    Elizabeth

## 2019-10-11 NOTE — TELEPHONE ENCOUNTER
Left vm. Pt advised Cde was calling to check in with pt to see how blood sugar levels were doing. Pt encouraged to call Cde at [phone], if she would liketo discuss bg levels or if she feels she needs to be seen.      Mikayla Sellers, RN, BSN, CDE   [facility]

## 2019-10-15 ENCOUNTER — TELEPHONE (OUTPATIENT)
Dept: PEDIATRICS | Facility: CLINIC | Age: 64
End: 2019-10-15

## 2019-10-15 NOTE — TELEPHONE ENCOUNTER
Called, reviewed 9/6 pre-op OV and relayed medication hold instructions and gave verbal orders for re-certification.  Asya Howell RN

## 2019-10-15 NOTE — TELEPHONE ENCOUNTER
[facility] Call Center    Phone Message    May a detailed message be left on voicemail: yes    Reason for Call: [facility] requesting a call to clarify medications prior to surgery and also get her Metformin dose.  [facility] also looking for re-certification.  Please advise.  Thank you.      Action Taken: [id number]

## 2019-10-22 DIAGNOSIS — Z53.9 DIAGNOSIS NOT YET DEFINED: Primary | ICD-10-CM

## 2019-10-22 PROCEDURE — 99207 C MD RECERTIFICATION HHA PT: CPT | Performed by: INTERNAL MEDICINE

## 2019-10-22 NOTE — TELEPHONE ENCOUNTER
"Home care nurse, Meredith, left message on clinic voicemail regarding blood sugars.     She stated:  7 day mwlaiiw=820  14 day uoqokbn=902  30 day zkqfjjt=084  60 day rosbaey=613  90 day bytlbid=831    Patient having cataract surgery Thursday. Will take \"80%\" of long acting insulin and no short acting day of surgery.    Will forward message to LEORA Saldana.    Argelia Maciel LPN  Diabetes Clinic Coordinator   Adult Endocrinology and Pediatric Specialty Clinics  [facility]        "

## 2019-10-23 ENCOUNTER — OFFICE VISIT (OUTPATIENT)
Dept: OPHTHALMOLOGY | Facility: CLINIC | Age: 64
End: 2019-10-23
Payer: MEDICARE

## 2019-10-23 DIAGNOSIS — H02.834 DERMATOCHALASIS OF BOTH UPPER EYELIDS: Primary | ICD-10-CM

## 2019-10-23 DIAGNOSIS — H02.831 DERMATOCHALASIS OF BOTH UPPER EYELIDS: Primary | ICD-10-CM

## 2019-10-23 PROCEDURE — 99024 POSTOP FOLLOW-UP VISIT: CPT | Performed by: OPHTHALMOLOGY

## 2019-10-23 NOTE — PROGRESS NOTES
Chief Complaint(s) and History of Present Illness(es)     Post Op (Ophthalmology) Both Eyes     Laterality: both eyes    Onset: 8 weeks ago    Pain scale: 0/10              Comments     BULB 8/16/19  Doing well, no concerns, healing the way she expected it to  No ointments anymore but does use AT's about twice daily    Serena Jensen, COT 9:59 AM 10/23/2019     Notices she can side peripherally much better and lights are brighter. Pleased with outcome.          Holly Muir is about 2 months status post bilateral upper eyelid blepharoplasty .  She is doing well.    She will follow up with me on an as needed basis.    Complete documentation of historical and exam elements from today's encounter can be found in the full encounter summary report (not reduplicated in this progress note). I personally obtained the chief complaint(s) and history of present illness.  I confirmed and edited as necessary the review of systems, past medical/surgical history, family history, social history, and examination findings as documented by others; and I examined the patient myself. I personally reviewed the relevant tests, images, and reports as documented above. I formulated and edited as necessary the assessment and plan and discussed the findings and management plan with the patient and family. - Randolph Cook MD

## 2019-10-23 NOTE — ANESTHESIA PREPROCEDURE EVALUATION
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      Past Surgical History:   Procedure Laterality Date     ARTHROSCOPY KNEE RT/LT      LT     BLEPHAROPLASTY BILATERAL Bilateral 2019    Procedure: BILATERAL UPPER LID BLEPHAROPLASTY;  Surgeon: Randolph Cook MD;  Location: [facility]     BREAST LUMPECTOMY, RT/LT      LT-benign      C EXCIS PTERYGIUM  10/08    [facility]     C STOMACH SURGERY PROCEDURE UNLISTED       CATARACT IOL, RT/LT       COLONOSCOPY  5/10/2012    Procedure:COLONOSCOPY; screening colonoscopy; Surgeon:MILLA VEGA; Location:[facility]     COLONOSCOPY WITH CO2 INSUFFLATION N/A 2019    Procedure: Colonoscopy with CO2 insufflation;  Surgeon: Himanshu Hi MD;  Location: [facility]     COLOSTOMY       COLOSTOMY       HC COLONOSCOPY THRU STOMA, DIAGNOSTIC      normal per report-no records available-records destroyed     HYSTERECTOMY, PAP NO LONGER INDICATED      done in [place]-cervical cancer     IMPLANT STIMULATOR SACRAL NERVE STAGE ONE Right 3/10/2015    Procedure: IMPLANT STIMULATOR SACRAL NERVE STAGE ONE;  Surgeon: Teodora Yusuf MD;  [facility]     IMPLANT STIMULATOR SACRAL NERVE STAGE TWO Right 3/31/2015    Procedure: IMPLANT STIMULATOR SACRAL NERVE STAGE TWO;  Surgeon: Teodora Yusuf MD;  [facility]     PHACOEMULSIFICATION WITH STANDARD  INTRAOCULAR LENS IMPLANT Left 9/26/2019    Procedure: LEFT PHACOEMULSIFICATION, CATARACT, WITH STANDARD IOL INSERTION;  Surgeon: Francesco Winn MD;  Location: [facility]     REPAIR PTOSIS Bilateral 08/16/2019     SALPINGO OOPHORECTOMY,R/L/JENNIFER      Salpingo Oophorectomy, RT/LT/JENNIFER                   PHYSICAL EXAM:   Mental Status/Neuro: A/A/O   Airway: Facies: Feasible  Mallampati: I  Mouth/Opening: Full  TM distance: > 6 cm  Neck ROM: Full   Respiratory: Auscultation: CTAB     Resp. Rate: Normal     Resp. Effort: Normal      CV: Rhythm: Regular  Rate: Age appropriate  Heart: Normal Sounds  Edema: None   Comments:      Dental: Normal Dentition                LABS:  CBC:   Lab Results   Component Value Date    WBC 5.7 07/29/2019    WBC 6.2 10/13/2011    HGB 11.8 07/29/2019    HGB 13.5 10/13/2011    HCT 36.9 07/29/2019    HCT 40.3 10/13/2011     07/29/2019     10/13/2011     BMP:   Lab Results   Component Value Date     07/29/2019     (L) 08/08/2018    POTASSIUM 3.9 07/29/2019    POTASSIUM 3.9 08/08/2018    CHLORIDE 103 07/29/2019    CHLORIDE 98 08/08/2018    CO2 29 07/29/2019    CO2 27 08/08/2018    BUN 12 07/29/2019    BUN 13 08/08/2018    CR 0.72 07/29/2019    CR 0.74 08/08/2018     (H) 07/29/2019     (H) 08/08/2018     COAGS: No results found for: PTT, INR, FIBR  POC:   Lab Results   Component Value Date     (H) 09/26/2019     OTHER:   Lab Results   Component Value Date    A1C 8.3 (H) 07/29/2019    NICOLÁS 9.3 07/29/2019    PHOS 4.8 (H) 02/03/2015    ALBUMIN 3.6 12/19/2016    PROTTOTAL 7.6 12/19/2016    ALT 58 (H) 12/19/2016    AST 29 12/19/2016    ALKPHOS 101 12/19/2016    BILITOTAL 0.3 12/19/2016    TSH 5.29 (H) 08/08/2018    T4 1.08 08/08/2018        Preop Vitals    BP Readings from Last 3 Encounters:   10/07/19 120/74   09/26/19 133/69   09/20/19 120/73    Pulse Readings from Last 3 Encounters:   10/07/19 75   09/26/19 81   09/20/19 72      Resp Readings from Last  "3 Encounters:   09/26/19 16   08/16/19 16   08/02/19 16    SpO2 Readings from Last 3 Encounters:   09/26/19 98%   09/20/19 100%   09/06/19 98%      Temp Readings from Last 1 Encounters:   09/26/19 97.2  F (36.2  C) (Oral)    Ht Readings from Last 1 Encounters:   10/07/19 1.575 m (5' 2\")      Wt Readings from Last 1 Encounters:   10/07/19 95.3 kg (210 lb)    Estimated body mass index is 38.41 kg/m  as calculated from the following:    Height as of 10/7/19: 1.575 m (5' 2\").    Weight as of 10/7/19: 95.3 kg (210 lb).     LDA:  Peripheral IV 03/10/15 Right Hand (Active)   Number of days: 1688        Assessment:   ASA SCORE: 3    H&P: History and physical reviewed and following examination; no interval change.   Smoking Status:  Non-Smoker/Unknown   NPO Status: NPO Appropriate     Plan:   Anes. Type:  General   Pre-Medication: None   Induction:  IV (Standard)   Airway: ETT; Oral   Access/Monitoring: PIV   Maintenance: Balanced     Postop Plan:   Postop Pain: Opioids  Postop Sedation/Airway: Not planned  Disposition: Outpatient     PONV Management:   Adult Risk Factors: Female, Postop Opioids   Prevention: Ondansetron     CONSENT: Direct conversation   Plan and risks discussed with: Patient   Blood Products: Consent Deferred (Minimal Blood Loss)                   Miah Babin MD     ANESTHESIA PREOP EVALUATION    PROCEDURE: Procedure(s):  RIGHT PHACOEMULSIFICATION, CATARACT, WITH STANDARD IOL INSERTION    HPI: Holly Muir is a 64 year old female who presents for above procedure 2/2 cataract.    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       PAST SURGICAL " HISTORY:    Past Surgical History:   Procedure Laterality Date     ARTHROSCOPY KNEE RT/LT      LT     BLEPHAROPLASTY BILATERAL Bilateral 8/16/2019    Procedure: BILATERAL UPPER LID BLEPHAROPLASTY;  Surgeon: Randolph Cook MD;  [facility]: [facility]     BREAST LUMPECTOMY, RT/LT      LT-benign      C EXCIS PTERYGIUM  10/08    [facility]     C STOMACH SURGERY PROCEDURE UNLISTED       CATARACT IOL, RT/LT       COLONOSCOPY  5/10/2012    Procedure:COLONOSCOPY; screening colonoscopy; Surgeon:MILLA VEGA; [facility]     COLONOSCOPY WITH CO2 INSUFFLATION N/A 8/2/2019    Procedure: Colonoscopy with CO2 insufflation;  Surgeon: Himanshu Hi MD;  [facility]: [facility]     COLOSTOMY  2000     COLOSTOMY       HC COLONOSCOPY THRU STOMA, DIAGNOSTIC  2002    normal per report-no records available-records destroyed     HYSTERECTOMY, PAP NO LONGER INDICATED      done in [place]-cervical cancer     IMPLANT STIMULATOR SACRAL NERVE STAGE ONE Right 3/10/2015    Procedure: IMPLANT STIMULATOR SACRAL NERVE STAGE ONE;  Surgeon: Teodora Yusuf MD;  [facility]     IMPLANT STIMULATOR SACRAL NERVE STAGE TWO Right 3/31/2015    Procedure: IMPLANT STIMULATOR SACRAL NERVE STAGE TWO;  Surgeon: Teodora Yusuf MD;  [facility]     PHACOEMULSIFICATION WITH STANDARD INTRAOCULAR LENS IMPLANT Left 9/26/2019    Procedure: LEFT PHACOEMULSIFICATION, CATARACT, WITH STANDARD IOL INSERTION;  Surgeon: Francesco Winn MD;  [facility]     REPAIR PTOSIS Bilateral 08/16/2019     SALPINGO OOPHORECTOMY,R/L/JENNIFER      Salpingo Oophorectomy, RT/LT/JENNIFER       SOCIAL HISTORY:       Social History     Tobacco Use     Smoking status: Former Smoker     Packs/day: 0.00     Last attempt to quit: 8/9/2016     Years since quitting: 3.2     Smokeless tobacco: Never Used   Substance Use Topics     Alcohol use: Yes     Alcohol/week: 0.0 standard drinks     Comment: social occasions       ALLERGIES:     Allergies   Allergen Reactions      Morphine Sulfate Itching       MEDICATIONS:     (Not in a hospital admission)      Current Outpatient Medications   Medication Sig Dispense Refill     albuterol (PROAIR HFA/PROVENTIL HFA/VENTOLIN HFA) 108 (90 Base) MCG/ACT inhaler Inhale 2 puffs into the lungs every 6 hours 1 Inhaler 0     ARIPiprazole (ABILIFY) 30 MG tablet Take 1 tablet (30 mg) by mouth daily       ARIPiprazole (ABILIFY) 5 MG tablet Take 1 tablet (5 mg) by mouth At Bedtime Along with 5mg tab to total 25mg.  Prescribed by Dr. Isael Jiménez at [facility] 30 tablet 1     busPIRone HCl (BUSPAR) 30 MG tablet TAKE 1 TABLET BY MOUTH TWICE A DAY 60 tablet 0     gabapentin (NEURONTIN) 600 MG tablet Take 1 tablet (600 mg) by mouth 3 times daily 90 tablet 11     ibuprofen (ADVIL/MOTRIN) 400 MG tablet Take 1 tablet (400 mg) by mouth every 8 hours as needed for moderate pain (with food)       insulin aspart (NOVOLOG FLEXPEN) 100 UNIT/ML pen 15 unit subcutaneous per meal three times a day. 30 mL 5     Multiple Vitamin (DAILY-ISAAC) TABS TAKE 1 TABLET BY MOUTH DAILY 30 tablet 11     nystatin (MYCOSTATIN) 413201 UNIT/GM POWD Apply 1 dose topically 3 times daily as needed 60 g 3     omeprazole (PRILOSEC) 20 MG DR capsule TAKE 2 CAPSULES BY MOUTH DAILY TAKE 30-60 MINUTES BEFORE A MEAL. 180 capsule 3     PARoxetine (PAXIL) 20 MG tablet TAKE 2 TABLETS AT BEDTIME (Prescribed by Dr. Isael Jiménez at [facility]) 30 tablet      pioglitazone (ACTOS) 45 MG tablet Take 1 tablet (45 mg) by mouth daily 90 tablet 3     propranolol ER (INDERAL LA) 60 MG 24 hr capsule Take 1 capsule (60 mg) by mouth daily 30 capsule 11     ramipril (ALTACE) 5 MG capsule Take 1 capsule (5 mg) by mouth daily 30 capsule 10     salicylic acid (MEDIPLAST) 40 % miscellaneous Apply to warts nightly as tolerated. 25 each 11     simvastatin (ZOCOR) 20 MG tablet Take 1 tablet (20 mg) by mouth At Bedtime 30 tablet 11     traZODone (DESYREL) 50 MG tablet Take 50 mg by mouth At Bedtime   1     aspirin ([facility]S ASPIRIN ADULT LOW DOSE) 81 MG chewable tablet TAKE 1 TABLET (81 MG) BY MOUTH DAILY 90 tablet 3     blood glucose (ACCU-CHEK CELIA) test strip Use to test blood sugars 3 times daily or as directed. 300 each 3     blood glucose monitoring (ACCU-CHEK CELIA PLUS) meter device kit Use to test blood sugars 4 times daily or as directed. 1 kit 0     blood glucose monitoring (ACCU-CHEK FASTCLIX) lancets Use to test blood sugar 6 times daily or as directed. 510 each 1     buPROPion (WELLBUTRIN XL) 300 MG 24 hr tablet TAKE 1 TABLET EVERY DAY. (TOTAL DAILY DOSE = 300MG)  1     [facility]S PAIN RELIEF 500 MG tablet TAKE 2 TABLETS BY MOUTH EVERY 8 HOURS. MAX ACETAMINOPHEN DOSE: 4000MG IN 24 HRS.  99     insulin glargine (BASAGLAR KWIKPEN) 100 UNIT/ML pen Take 55 units daily. 60 mL 5     insulin pen needle 31G X 5 MM Use four times 4 day times a day 400 each 1     metFORMIN (GLUCOPHAGE) 1000 MG tablet Take 1 tablet (1,000 mg) by mouth 2 times daily (with meals) 180 tablet 3     order for DME Equipment being ordered: grab bar for bath tub/shower. 1 Units 0     order for DME Equipment being ordered: railing for stairs at home. 1 Units 0     order for DME Equipment being ordered: Knee walker  1 Device 0     order for DME by Device route continuous Air walker -  Use as instructed 1 Device 0     ORDER FOR DME Equipment being ordered:  Ostomy supplies.  Drain Pouch by Valarie #.    Also needs Barrier by Valarie #. 3 Month 4     ORDER FOR DME Equipment being ordered: Cane 1 Units 0     [START ON 10/25/2019] traMADol (ULTRAM) 50 MG tablet TAKE 1/2 TABLET BY MOUTH 3 TIMES A DAY 45 tablet 0     UNIVERSAL REMOVER WIPES EX MISC uses to remove barrier from colostomy bag at time of change 1 Each 3     vitamin D3 (CHOLECALCIFEROL) 2000 units (50 mcg) tablet TAKE 1 TABLET BY MOUTH DAILY 100 tablet 3       Current Outpatient Medications Ordered in Epic   Medication Sig Dispense Refill     albuterol (PROAIR  HFA/PROVENTIL HFA/VENTOLIN HFA) 108 (90 Base) MCG/ACT inhaler Inhale 2 puffs into the lungs every 6 hours 1 Inhaler 0     ARIPiprazole (ABILIFY) 30 MG tablet Take 1 tablet (30 mg) by mouth daily       ARIPiprazole (ABILIFY) 5 MG tablet Take 1 tablet (5 mg) by mouth At Bedtime Along with 5mg tab to total 25mg.  Prescribed by Dr. Isael Jiménez at [facility] 30 tablet 1     busPIRone HCl (BUSPAR) 30 MG tablet TAKE 1 TABLET BY MOUTH TWICE A DAY 60 tablet 0     gabapentin (NEURONTIN) 600 MG tablet Take 1 tablet (600 mg) by mouth 3 times daily 90 tablet 11     ibuprofen (ADVIL/MOTRIN) 400 MG tablet Take 1 tablet (400 mg) by mouth every 8 hours as needed for moderate pain (with food)       insulin aspart (NOVOLOG FLEXPEN) 100 UNIT/ML pen 15 unit subcutaneous per meal three times a day. 30 mL 5     Multiple Vitamin (DAILY-ISAAC) TABS TAKE 1 TABLET BY MOUTH DAILY 30 tablet 11     nystatin (MYCOSTATIN) 075387 UNIT/GM POWD Apply 1 dose topically 3 times daily as needed 60 g 3     omeprazole (PRILOSEC) 20 MG DR capsule TAKE 2 CAPSULES BY MOUTH DAILY TAKE 30-60 MINUTES BEFORE A MEAL. 180 capsule 3     PARoxetine (PAXIL) 20 MG tablet TAKE 2 TABLETS AT BEDTIME (Prescribed by Dr. Isael Jiménez at [email or web address]) 30 tablet      pioglitazone (ACTOS) 45 MG tablet Take 1 tablet (45 mg) by mouth daily 90 tablet 3     propranolol ER (INDERAL LA) 60 MG 24 hr capsule Take 1 capsule (60 mg) by mouth daily 30 capsule 11     ramipril (ALTACE) 5 MG capsule Take 1 capsule (5 mg) by mouth daily 30 capsule 10     salicylic acid (MEDIPLAST) 40 % miscellaneous Apply to warts nightly as tolerated. 25 each 11     simvastatin (ZOCOR) 20 MG tablet Take 1 tablet (20 mg) by mouth At Bedtime 30 tablet 11     traZODone (DESYREL) 50 MG tablet Take 50 mg by mouth At Bedtime  1     aspirin (CVS ASPIRIN ADULT LOW DOSE) 81 MG chewable tablet TAKE 1 TABLET (81 MG) BY MOUTH DAILY 90 tablet 3     blood glucose (ACCU-CHEK CELIA) test strip Use to  test blood sugars 3 times daily or as directed. 300 each 3     blood glucose monitoring (ACCU-CHEK CELIA PLUS) meter device kit Use to test blood sugars 4 times daily or as directed. 1 kit 0     blood glucose monitoring (ACCU-CHEK FASTCLIX) lancets Use to test blood sugar 6 times daily or as directed. 510 each 1     buPROPion (WELLBUTRIN XL) 300 MG 24 hr tablet TAKE 1 TABLET EVERY DAY. (TOTAL DAILY DOSE = 300MG)  1     CVS PAIN RELIEF 500 MG tablet TAKE 2 TABLETS BY MOUTH EVERY 8 HOURS. MAX ACETAMINOPHEN DOSE: 4000MG IN 24 HRS.  99     insulin glargine (BASAGLAR KWIKPEN) 100 UNIT/ML pen Take 55 units daily. 60 mL 5     insulin pen needle 31G X 5 MM Use four times 4 day times a day 400 each 1     metFORMIN (GLUCOPHAGE) 1000 MG tablet Take 1 tablet (1,000 mg) by mouth 2 times daily (with meals) 180 tablet 3     order for DME Equipment being ordered: grab bar for bath tub/shower. 1 Units 0     order for DME Equipment being ordered: railing for stairs at home. 1 Units 0     order for DME Equipment being ordered: Knee walker  1 Device 0     order for DME by Device route continuous Air walker -  Use as instructed 1 Device 0     ORDER FOR DME Equipment being ordered:  Ostomy supplies.  Drain Pouch by Valarie #.    Also needs Barrier by Valarie #. 3 Month 4     ORDER FOR DME Equipment being ordered: Cane 1 Units 0     [START ON 10/25/2019] traMADol (ULTRAM) 50 MG tablet TAKE 1/2 TABLET BY MOUTH 3 TIMES A DAY 45 tablet 0     UNIVERSAL REMOVER WIPES EX MISC uses to remove barrier from colostomy bag at time of change 1 Each 3     vitamin D3 (CHOLECALCIFEROL) 2000 units (50 mcg) tablet TAKE 1 TABLET BY MOUTH DAILY 100 tablet 3     Current Facility-Administered Medications Ordered in Epic   Medication Dose Route Frequency Provider Last Rate Last Dose     triamcinolone (KENALOG-40) injection 40 mg  40 mg   Santy Harrington, DO   40 mg at 10/07/19 1616     triamcinolone (KENALOG-40) injection 40 mg  40 mg    Santy Harrington Aldo, DO   40 mg at 10/07/19 1616     triamcinolone (KENALOG-40) injection 40 mg  40 mg   Santy Harrington, DO   40 mg at 05/07/19 1102     triamcinolone (KENALOG-40) injection 40 mg  40 mg   Santy Harrington, DO   40 mg at 05/07/19 1102       PHYSICAL EXAM:    Vitals: T Data Unavailable, P Data Unavailable, BP Data Unavailable, R Data Unavailable, SpO2  , Weight   Wt Readings from Last 2 Encounters:   10/07/19 95.3 kg (210 lb)   09/06/19 95.7 kg (210 lb 14.4 oz)       See doc flowsheet    NPO STATUS: see doc flowsheet    LABS:    BMP:  Recent Labs   Lab Test 07/29/19  0933      POTASSIUM 3.9   CHLORIDE 103   CO2 29   BUN 12   CR 0.72   *   NICOLÁS 9.3       LFTs:   Recent Labs   Lab Test 12/19/16  0756   PROTTOTAL 7.6   ALBUMIN 3.6   BILITOTAL 0.3   ALKPHOS 101   AST 29   ALT 58*       CBC:   Recent Labs   Lab Test 07/29/19  0933   WBC 5.7   RBC 4.23   HGB 11.8   HCT 36.9   MCV 87   MCH 27.9   MCHC 32.0   RDW 14.7          Coags:  No results for input(s): INR, PTT, FIBR in the last 32579 hours.    Imaging:  No orders to display       Miah Babin MD  Anesthesiology Staff  Pager [phone]    10/23/2019  2:23 PM

## 2019-10-24 ENCOUNTER — SURGERY (OUTPATIENT)
Age: 64
End: 2019-10-24
Payer: MEDICARE

## 2019-10-24 ENCOUNTER — HOSPITAL ENCOUNTER (OUTPATIENT)
Facility: AMBULATORY SURGERY CENTER | Age: 64
Discharge: HOME | End: 2019-10-24
Attending: OPHTHALMOLOGY | Admitting: OPHTHALMOLOGY
Payer: MEDICARE

## 2019-10-24 VITALS
OXYGEN SATURATION: 100 % | RESPIRATION RATE: 16 BRPM | TEMPERATURE: 97.6 F | SYSTOLIC BLOOD PRESSURE: 140 MMHG | DIASTOLIC BLOOD PRESSURE: 58 MMHG

## 2019-10-24 DIAGNOSIS — H25.12 AGE-RELATED NUCLEAR CATARACT OF LEFT EYE: Primary | ICD-10-CM

## 2019-10-24 DIAGNOSIS — H25.11 AGE-RELATED NUCLEAR CATARACT OF RIGHT EYE: ICD-10-CM

## 2019-10-24 LAB — GLUCOSE BLDC GLUCOMTR-MCNC: 97 MG/DL (ref 70–99)

## 2019-10-24 PROCEDURE — 66984 XCAPSL CTRC RMVL W/O ECP: CPT | Mod: 79 | Performed by: OPHTHALMOLOGY

## 2019-10-24 PROCEDURE — G8907 PT DOC NO EVENTS ON DISCHARG: HCPCS

## 2019-10-24 PROCEDURE — G8918 PT W/O PREOP ORDER IV AB PRO: HCPCS

## 2019-10-24 PROCEDURE — 66984 XCAPSL CTRC RMVL W/O ECP: CPT | Mod: RT

## 2019-10-24 DEVICE — EYE IMP IOL AMO PCL TECNIS ZCB00 24.0: Type: IMPLANTABLE DEVICE | Site: EYE | Status: FUNCTIONAL

## 2019-10-24 RX ORDER — ONDANSETRON 2 MG/ML
4 INJECTION INTRAMUSCULAR; INTRAVENOUS EVERY 30 MIN PRN
Status: DISCONTINUED | OUTPATIENT
Start: 2019-10-24 | End: 2019-10-25 | Stop reason: HOSPADM

## 2019-10-24 RX ORDER — PROPARACAINE HYDROCHLORIDE 5 MG/ML
1 SOLUTION/ DROPS OPHTHALMIC
Status: DISCONTINUED | OUTPATIENT
Start: 2019-10-24 | End: 2019-10-25 | Stop reason: HOSPADM

## 2019-10-24 RX ORDER — PHYSOSTIGMINE SALICYLATE 1 MG/ML
1.2 INJECTION INTRAVENOUS
Status: DISCONTINUED | OUTPATIENT
Start: 2019-10-24 | End: 2019-10-25 | Stop reason: HOSPADM

## 2019-10-24 RX ORDER — FENTANYL CITRATE 50 UG/ML
25-50 INJECTION, SOLUTION INTRAMUSCULAR; INTRAVENOUS
Status: DISCONTINUED | OUTPATIENT
Start: 2019-10-24 | End: 2019-10-25 | Stop reason: HOSPADM

## 2019-10-24 RX ORDER — HYDROMORPHONE HYDROCHLORIDE 1 MG/ML
.3-.5 INJECTION, SOLUTION INTRAMUSCULAR; INTRAVENOUS; SUBCUTANEOUS EVERY 10 MIN PRN
Status: DISCONTINUED | OUTPATIENT
Start: 2019-10-24 | End: 2019-10-25 | Stop reason: HOSPADM

## 2019-10-24 RX ORDER — KETAMINE HYDROCHLORIDE 10 MG/ML
INJECTION, SOLUTION INTRAMUSCULAR; INTRAVENOUS PRN
Status: DISCONTINUED | OUTPATIENT
Start: 2019-10-24 | End: 2019-10-24

## 2019-10-24 RX ORDER — ACETAMINOPHEN 325 MG/1
975 TABLET ORAL ONCE
Status: COMPLETED | OUTPATIENT
Start: 2019-10-24 | End: 2019-10-24

## 2019-10-24 RX ORDER — MEPERIDINE HYDROCHLORIDE 25 MG/ML
12.5 INJECTION INTRAMUSCULAR; INTRAVENOUS; SUBCUTANEOUS
Status: DISCONTINUED | OUTPATIENT
Start: 2019-10-24 | End: 2019-10-25 | Stop reason: HOSPADM

## 2019-10-24 RX ORDER — DEXAMETHASONE SODIUM PHOSPHATE 4 MG/ML
4 INJECTION, SOLUTION INTRA-ARTICULAR; INTRALESIONAL; INTRAMUSCULAR; INTRAVENOUS; SOFT TISSUE EVERY 10 MIN PRN
Status: DISCONTINUED | OUTPATIENT
Start: 2019-10-24 | End: 2019-10-25 | Stop reason: HOSPADM

## 2019-10-24 RX ORDER — OXYCODONE HYDROCHLORIDE 5 MG/1
5 TABLET ORAL EVERY 4 HOURS PRN
Status: DISCONTINUED | OUTPATIENT
Start: 2019-10-24 | End: 2019-10-25 | Stop reason: HOSPADM

## 2019-10-24 RX ORDER — NALOXONE HYDROCHLORIDE 0.4 MG/ML
.1-.4 INJECTION, SOLUTION INTRAMUSCULAR; INTRAVENOUS; SUBCUTANEOUS
Status: DISCONTINUED | OUTPATIENT
Start: 2019-10-24 | End: 2019-10-25 | Stop reason: HOSPADM

## 2019-10-24 RX ORDER — PHENYLEPHRINE HYDROCHLORIDE 25 MG/ML
1 SOLUTION/ DROPS OPHTHALMIC
Status: COMPLETED | OUTPATIENT
Start: 2019-10-24 | End: 2019-10-24

## 2019-10-24 RX ORDER — SODIUM CHLORIDE, SODIUM LACTATE, POTASSIUM CHLORIDE, CALCIUM CHLORIDE 600; 310; 30; 20 MG/100ML; MG/100ML; MG/100ML; MG/100ML
INJECTION, SOLUTION INTRAVENOUS CONTINUOUS
Status: DISCONTINUED | OUTPATIENT
Start: 2019-10-24 | End: 2019-10-25 | Stop reason: HOSPADM

## 2019-10-24 RX ORDER — ONDANSETRON 4 MG/1
4 TABLET, ORALLY DISINTEGRATING ORAL EVERY 30 MIN PRN
Status: DISCONTINUED | OUTPATIENT
Start: 2019-10-24 | End: 2019-10-25 | Stop reason: HOSPADM

## 2019-10-24 RX ORDER — KETOROLAC TROMETHAMINE 5 MG/ML
1 SOLUTION OPHTHALMIC
Status: COMPLETED | OUTPATIENT
Start: 2019-10-24 | End: 2019-10-24

## 2019-10-24 RX ORDER — LIDOCAINE 40 MG/G
CREAM TOPICAL
Status: DISCONTINUED | OUTPATIENT
Start: 2019-10-24 | End: 2019-10-25 | Stop reason: HOSPADM

## 2019-10-24 RX ORDER — HYDRALAZINE HYDROCHLORIDE 20 MG/ML
2.5-5 INJECTION INTRAMUSCULAR; INTRAVENOUS EVERY 10 MIN PRN
Status: DISCONTINUED | OUTPATIENT
Start: 2019-10-24 | End: 2019-10-25 | Stop reason: HOSPADM

## 2019-10-24 RX ORDER — MOXIFLOXACIN 5 MG/ML
1 SOLUTION/ DROPS OPHTHALMIC
Status: COMPLETED | OUTPATIENT
Start: 2019-10-24 | End: 2019-10-24

## 2019-10-24 RX ORDER — CYCLOPENTOLATE HYDROCHLORIDE 10 MG/ML
1 SOLUTION/ DROPS OPHTHALMIC
Status: COMPLETED | OUTPATIENT
Start: 2019-10-24 | End: 2019-10-24

## 2019-10-24 RX ORDER — SODIUM CHLORIDE, SODIUM LACTATE, POTASSIUM CHLORIDE, CALCIUM CHLORIDE 600; 310; 30; 20 MG/100ML; MG/100ML; MG/100ML; MG/100ML
500 INJECTION, SOLUTION INTRAVENOUS CONTINUOUS
Status: DISCONTINUED | OUTPATIENT
Start: 2019-10-24 | End: 2019-10-25 | Stop reason: HOSPADM

## 2019-10-24 RX ORDER — MOXIFLOXACIN 5 MG/ML
SOLUTION/ DROPS OPHTHALMIC PRN
Status: DISCONTINUED | OUTPATIENT
Start: 2019-10-24 | End: 2019-10-24 | Stop reason: HOSPADM

## 2019-10-24 RX ORDER — TETRACAINE HYDROCHLORIDE 5 MG/ML
SOLUTION OPHTHALMIC PRN
Status: DISCONTINUED | OUTPATIENT
Start: 2019-10-24 | End: 2019-10-24 | Stop reason: HOSPADM

## 2019-10-24 RX ORDER — ALBUTEROL SULFATE 0.83 MG/ML
2.5 SOLUTION RESPIRATORY (INHALATION) EVERY 4 HOURS PRN
Status: DISCONTINUED | OUTPATIENT
Start: 2019-10-24 | End: 2019-10-25 | Stop reason: HOSPADM

## 2019-10-24 RX ADMIN — PHENYLEPHRINE HYDROCHLORIDE 1 DROP: 25 SOLUTION/ DROPS OPHTHALMIC at 07:20

## 2019-10-24 RX ADMIN — CYCLOPENTOLATE HYDROCHLORIDE 1 DROP: 10 SOLUTION/ DROPS OPHTHALMIC at 07:20

## 2019-10-24 RX ADMIN — KETOROLAC TROMETHAMINE 1 DROP: 5 SOLUTION OPHTHALMIC at 07:15

## 2019-10-24 RX ADMIN — CYCLOPENTOLATE HYDROCHLORIDE 1 DROP: 10 SOLUTION/ DROPS OPHTHALMIC at 07:15

## 2019-10-24 RX ADMIN — PHENYLEPHRINE HYDROCHLORIDE 1 DROP: 25 SOLUTION/ DROPS OPHTHALMIC at 07:25

## 2019-10-24 RX ADMIN — TETRACAINE HYDROCHLORIDE 1 DROP: 5 SOLUTION OPHTHALMIC at 08:17

## 2019-10-24 RX ADMIN — CYCLOPENTOLATE HYDROCHLORIDE 1 DROP: 10 SOLUTION/ DROPS OPHTHALMIC at 07:25

## 2019-10-24 RX ADMIN — KETOROLAC TROMETHAMINE 1 DROP: 5 SOLUTION OPHTHALMIC at 07:25

## 2019-10-24 RX ADMIN — SODIUM CHLORIDE, SODIUM LACTATE, POTASSIUM CHLORIDE, CALCIUM CHLORIDE 500 ML: 600; 310; 30; 20 INJECTION, SOLUTION INTRAVENOUS at 07:21

## 2019-10-24 RX ADMIN — KETAMINE HYDROCHLORIDE 15 MG: 10 INJECTION, SOLUTION INTRAMUSCULAR; INTRAVENOUS at 08:20

## 2019-10-24 RX ADMIN — MOXIFLOXACIN 1 DROP: 5 SOLUTION/ DROPS OPHTHALMIC at 08:31

## 2019-10-24 RX ADMIN — ACETAMINOPHEN 975 MG: 325 TABLET ORAL at 07:14

## 2019-10-24 RX ADMIN — MOXIFLOXACIN 1 DROP: 5 SOLUTION/ DROPS OPHTHALMIC at 07:25

## 2019-10-24 RX ADMIN — KETOROLAC TROMETHAMINE 1 DROP: 5 SOLUTION OPHTHALMIC at 07:20

## 2019-10-24 RX ADMIN — MOXIFLOXACIN 1 DROP: 5 SOLUTION/ DROPS OPHTHALMIC at 07:20

## 2019-10-24 RX ADMIN — Medication 1 DROP: at 08:17

## 2019-10-24 RX ADMIN — Medication 250 ML: at 08:23

## 2019-10-24 RX ADMIN — PHENYLEPHRINE HYDROCHLORIDE 1 DROP: 25 SOLUTION/ DROPS OPHTHALMIC at 07:15

## 2019-10-24 RX ADMIN — MOXIFLOXACIN 1 DROP: 5 SOLUTION/ DROPS OPHTHALMIC at 07:15

## 2019-10-24 NOTE — ANESTHESIA CARE TRANSFER NOTE
Patient: Holly Muir    Procedure(s):  RIGHT PHACOEMULSIFICATION, CATARACT, WITH STANDARD IOL INSERTION    Diagnosis: Right cataract  Diagnosis Additional Information: No value filed.    Anesthesia Type:   MAC     Note:    Patient transferred to:Phase II  Handoff Report: Identifed the Patient, Identified the Reponsible Provider, Reviewed the pertinent medical history, Discussed the surgical course, Reviewed Intra-OP anesthesia mangement and issues during anesthesia, Set expectations for post-procedure period and Allowed opportunity for questions and acknowledgement of understanding      Vitals: (Last set prior to Anesthesia Care Transfer)    CRNA VITALS  10/24/2019 0804 - 10/24/2019 0834      10/24/2019             Pulse:  79    SpO2:  100 %                Electronically Signed By: KASIE Kelly CRNA  October 24, 2019  8:34 AM

## 2019-10-24 NOTE — DISCHARGE INSTRUCTIONS
Holly Muir    Cataract Surgery Postoperative Instructions    Postoperative Medications: After surgery, you will use several different eye drop  medications. In most cases you will start these eye drops 2 days before surgery.    1. Ocuflox - is an antibiotic drop that is used to minimize the risk of infection. It should be used 4 times daily for 10 days total or until you are told to discontinue.    (Acceptable alternatives to Ocuflox include: Zymaxid, Besivance, Gatafloxacin, Vigamox)     2.  Ketorolac - is an anti-inflammatory drop. Use it 4 time daily for 21 days total or until you are told to discontinue.  (Acceptable alternatives to Ketorolac include: Acuvail, Nevenac, Xibrom)    3. Prednisilone - is a steroid eye drop, used to minimize inflammation and modulate  healing. It should be used 4 times daily for 21 days total or until you are told to discontinue.  (Acceptable alternatives for Prednisilone include: Pred Forte, Omnipred, Econopred)      IF YOU GET AN ALTERNATIVE EYE DROP PLEASE FOLLOW THE DIRECTIONS ON THE BOTTLE OF DROPS. THEY WILL BE DIFFERENT!      The drops might sting a little when they are instilled, and that is normal.    It doesn t matter what order you put the drops into your eyes, but you should wait at least one minute between drops.    Please continue any glaucoma, dry eye, or other medications you were using prior to the surgery.    Please allow 24 to 48 hours when requesting refills, and call BEFORE you run out of drops.      Artificial Tears - are lubricating drops used to moisturize the eye. You can use these as much as you want, particularly if your eyes feel watery, gritty, or uncomfortable. Chilling these drops in the refrigerator results in a more soothing feeling. There are several brands of artificial tears available including, but not limited to: Optive, Refresh, Systane, Blink, Genteal, Soothe, and others. You should not use drops that  get the red out . You do not  need a prescription for these medications.      Restriction on Activities - It is extremely important that you DO NOT RUB THE  TREATED EYE.  - You will be given a clear plastic shield to wear as protection over your eye the  night after surgery.  - Refrain from any activities that may put your eye at risk of injury, as well as areas  containing a high volume of chemicals, dust, and debris.  - Do Not wear any eye makeup or moisturizer around the eye for 1 week after  surgery.  - Do Not swim or go into a hot-tub, Jacuzzi, or sauna for 1 week after surgery. You  can take showers as normal, but avoid getting shampoo or soap in your eyes.  - Avoid strenuous activity, including lifting more than 30 lbs, for 1 week after  surgery.  - It is fine to bathe, read, watch TV, and use the computer.  Symptoms requiring medical attention:  - Sudden onset of increased discharge from the eye  - Persistent or increasing pain in the eye  - Sudden decrease in vision  - Persistent nausea or vomiting    If you have any questions or concerns before or after your surgery, please contact:    Dr. Winn s office at [phone]    [facility]  Same-Day Surgery   Adult Discharge Orders & Instructions   For 24 hours after surgery  1. Get plenty of rest.  A responsible adult must stay with you for at least 24 hours after you leave the hospital.   2. Do not drive or use heavy equipment.  If you have weakness or tingling, don't drive or use heavy equipment until this feeling goes away.  3. Do not drink alcohol.  4. Avoid strenuous or risky activities.  Ask for help when climbing stairs.   5. You may feel lightheaded.  IF so, sit for a few minutes before standing.  Have someone help you get up.   6. If you have nausea (feel sick to your stomach): Drink only clear liquids such as apple juice, ginger ale, broth or 7-Up.  Rest may also help.  Be sure to drink enough fluids.  Move to a regular diet as you feel able.  7. You  may have a slight fever. Call the doctor if your fever is over 100 F (37.7 C) (taken under the tongue) or lasts longer than 24 hours.  8. You may have a dry mouth, a sore throat, muscle aches or trouble sleeping.  These should go away after 24 hours.  9. Do not make important or legal decisions.   Call your doctor for any of the followin.  Signs of infection (fever, growing tenderness at the surgery site, a large amount of drainage or bleeding, severe pain, foul-smelling drainage, redness, swelling).    2. It has been over 8 to 10 hours since surgery and you are still not able to urinate (pass water).    3.  Headache for over 24 hours.

## 2019-10-24 NOTE — ANESTHESIA POSTPROCEDURE EVALUATION
Anesthesia POST Procedure Evaluation    Patient: Holly Muir   MRN:     [id number] Gender:   female   Age:    64 year old :      1955        Preoperative Diagnosis: Right cataract   Procedure(s):  RIGHT PHACOEMULSIFICATION, CATARACT, WITH STANDARD IOL INSERTION   Postop Comments: No value filed.       Anesthesia Type:  Not documented  MAC    Reportable Event: NO     PAIN: Uncomplicated   Sign Out status: Comfortable, Well controlled pain     PONV: No PONV   Sign Out status:  No Nausea or Vomiting     Neuro/Psych: Uneventful perioperative course   Sign Out Status: Preoperative baseline; Age appropriate mentation     Airway/Resp.: Uneventful perioperative course   Sign Out Status: Non labored breathing, age appropriate RR; Resp. Status within EXPECTED Parameters     CV: Uneventful perioperative course   Sign Out status: Appropriate BP and perfusion indices; Appropriate HR/Rhythm     Disposition:   Sign Out in:  Phase II  Disposition:  Home  Recovery Course: Uneventful  Follow-Up: Not required           Last Anesthesia Record Vitals:  CRNA VITALS  10/24/2019 0803 - 10/24/2019 0853      10/24/2019             Pulse:  79    SpO2:  100 %          Last PACU Vitals:  Vitals Value Taken Time   /55 10/24/2019  8:33 AM   Temp     Pulse     Resp 16 10/24/2019  8:33 AM   SpO2 100 % 10/24/2019  8:33 AM   Temp src     NIBP 122/59 10/24/2019  8:28 AM   Pulse 79 10/24/2019  8:31 AM   SpO2 100 % 10/24/2019  8:31 AM   Resp     Temp     Ht Rate     Temp 2           Electronically Signed By: Miah Babin MD, 2019, 8:53 AM

## 2019-10-24 NOTE — OP NOTE
PATIENT NAME:  Holly Muir    :  1955    PATIENT NUMBER:  [id number]    DATE OF SURGERY:  10/24/2019    SURGEON:  Francesco Winn MD, M.D.    PREOPERATIVE DIAGNOSIS: Cataract right eye.    POSTOPERATIVE DIAGNOSIS:  Same    PROCEDURE PERFORMED:    1. Phacoemulsification with posterior chamber intraocular lens right eye.    ANESTHESIA:  Topical/MAC    COMPLICATIONS:  None    PROCEDURE: Following adequate preoperative dilation the patient was given topical anesthesia consisting of Proparacaine.  The patient was brought to the operative suite where the eye was prepped and draped in the usual sterile fashion.  A lid speculum was applied. A super sharp blade was used to create a paracentesis, through which 1% preservative free Lidocaine was injected.  Visoelastic was then used to inflate the anterior chamber.  A biplanar incision at the clear cornea limbus was created with a keratome.  A continuous curvilinear capsulorrhexis was started with a cystitome and completed using Utrata forceps.  The lens was hydrodissected and hydro delineated using BSS on a cannula.  The lens nucleus was removed using phacoemulsification.  Remaining cortex was removed using irrigation and aspiration.  Viscoelastic was injected to inflate the capsular bag and a 24.0 D ZCB00 IOL was inserted into the capsular bag without difficulty.  Residual viscoelastic and provisc material was removed with irrigation and aspiration.  BSS was used to hydrate the corneal incision and paracentesis sites which were checked and noted to be watertight.  A drop of Vigamox was applied to the eye and a clear plastic shield was placed.  The patient tolerated the procedure well and left the operative suite in stable condition.    Francesco Winn M.D.

## 2019-10-24 NOTE — ANESTHESIA CARE TRANSFER NOTE
Patient: Holly Muir    Procedure(s):  RIGHT PHACOEMULSIFICATION, CATARACT, WITH STANDARD IOL INSERTION    Diagnosis: Right cataract  Diagnosis Additional Information: No value filed.    Anesthesia Type:   MAC     Note:  Anesthesia Care Transfer Notewriter    Vitals: (Last set prior to Anesthesia Care Transfer)    CRNA VITALS  10/24/2019 0805 - 10/24/2019 0835      10/24/2019             Pulse:  79    SpO2:  100 %                Electronically Signed By: KASIE Kelly CRNA  October 24, 2019  8:35 AM

## 2019-10-25 ENCOUNTER — OFFICE VISIT (OUTPATIENT)
Dept: OPTOMETRY | Facility: CLINIC | Age: 64
End: 2019-10-25
Payer: MEDICARE

## 2019-10-25 DIAGNOSIS — Z96.1 PSEUDOPHAKIA OF RIGHT EYE: Primary | ICD-10-CM

## 2019-10-25 PROCEDURE — 99024 POSTOP FOLLOW-UP VISIT: CPT | Performed by: OPTOMETRIST

## 2019-10-25 ASSESSMENT — REFRACTION_WEARINGRX
OS_CYLINDER: +1.00
OD_SPHERE: +1.00
OD_CYLINDER: +0.50
OD_AXIS: 015
OS_VPRISM: 1.5
OD_VBASE: DOWN
OD_ADD: +2.50
OS_AXIS: 180
OD_VPRISM: 1.5
OS_VBASE: UP
OS_SPHERE: +0.50
OS_ADD: +2.50

## 2019-10-25 ASSESSMENT — VISUAL ACUITY
OS_SC+: -2
OD_SC: 20/70
OD_PH_SC: 20/30
METHOD: SNELLEN - LINEAR
OS_SC: 20/30

## 2019-10-25 ASSESSMENT — EXTERNAL EXAM - RIGHT EYE: OD_EXAM: NORMAL

## 2019-10-25 ASSESSMENT — TONOMETRY
OD_IOP_MMHG: 26
IOP_METHOD: APPLANATION

## 2019-10-25 ASSESSMENT — EXTERNAL EXAM - LEFT EYE: OS_EXAM: NORMAL

## 2019-10-25 ASSESSMENT — SLIT LAMP EXAM - LIDS
COMMENTS: NORMAL
COMMENTS: NORMAL

## 2019-10-25 NOTE — PATIENT INSTRUCTIONS
Continue with drops and scheduled follow up appointments.  Follow directions on your bottles as far as how many drops to use daily.    Wear shield while sleeping.    Contact your pharmacy to refill drops if needed.       Please continue any glaucoma, dry eye, or other medications you were using prior to the surgery.        Ariel Greene, OD  [facility]  [address] [place] [address]  Tel- [phone]  Mji-[phone]

## 2019-10-25 NOTE — PROGRESS NOTES
CHIEF COMPLAINT:   Chief Complaint   Patient presents with     Cataract Follow-Up     1 day post op od eye       Type of surgery cataract   Date of surgery 10- od eye                             9- os eye    Queenie Shine, Optometric Assistant, A.B.O.C.     Drops reviewed.    OBJECTIVE:     See ophthalmology exam    ASSESSMENT:         ICD-10-CM    1. Pseudophakia of right eye Z96.1      PLAN:      Patient Instructions   Continue with drops and scheduled follow up appointments.  Follow directions on your bottles as far as how many drops to use daily.    Wear shield while sleeping.    Contact your pharmacy to refill drops if needed.       Please continue any glaucoma, dry eye, or other medications you were using prior to the surgery.        Ariel Greene, OD  [facility]  [address] [place] [address]  Tel- [phone]  Gid-[phone]

## 2019-10-28 ENCOUNTER — MYC MEDICAL ADVICE (OUTPATIENT)
Dept: PEDIATRICS | Facility: CLINIC | Age: 64
End: 2019-10-28

## 2019-10-28 NOTE — LETTER
November 20, 2019      Holly Muir  [address] [id number]        Dear Holly Muir,    In order to ensure that we are providing the best quality care, we would like to remind you that you are due for a Return appointment with Tiffany King MD around 12/27/19 for six month recheck.      We have been unable to reach you by phone or [employer]. Please call your clinic or use [employer] to make an appointment with your provider before you run out of medication. This will prevent a delay in your next refill. Please let us know if you have any questions and we would be happy to help.     Thank you for trusting us with your care.    Sincerely,     [facility]  [phone]

## 2019-10-29 ENCOUNTER — TELEPHONE (OUTPATIENT)
Dept: PEDIATRICS | Facility: CLINIC | Age: 64
End: 2019-10-29

## 2019-10-29 DIAGNOSIS — R21 SKIN RASH: ICD-10-CM

## 2019-10-29 RX ORDER — NYSTATIN 100000 [USP'U]/G
POWDER TOPICAL
Qty: 60 G | Refills: 3 | Status: SHIPPED | OUTPATIENT
Start: 2019-10-29 | End: 2021-01-06

## 2019-10-29 NOTE — TELEPHONE ENCOUNTER
[facility] Call Center    Phone Message    May a detailed message be left on voicemail: no    Reason for Call: Medication Request.   [facility].   Pt has a rash in groin area.  Asking for a Nystatin powder to be called in for her.     [id number] - [place], [place] - [address] AT [address] OF [address]

## 2019-11-01 ENCOUNTER — OFFICE VISIT (OUTPATIENT)
Dept: OPTOMETRY | Facility: CLINIC | Age: 64
End: 2019-11-01
Payer: MEDICARE

## 2019-11-01 DIAGNOSIS — Z96.1 PSEUDOPHAKIA OF RIGHT EYE: Primary | ICD-10-CM

## 2019-11-01 PROCEDURE — 99024 POSTOP FOLLOW-UP VISIT: CPT | Performed by: OPTOMETRIST

## 2019-11-01 ASSESSMENT — REFRACTION_WEARINGRX
OS_CYLINDER: +1.00
OD_SPHERE: +1.00
OD_CYLINDER: +0.50
OS_AXIS: 180
OS_SPHERE: +0.50
OD_ADD: +2.50
OS_VPRISM: 1.5
OD_VBASE: DOWN
OD_AXIS: 015
OS_ADD: +2.50
OD_VPRISM: 1.5
OS_VBASE: UP

## 2019-11-01 ASSESSMENT — SLIT LAMP EXAM - LIDS
COMMENTS: NORMAL
COMMENTS: NORMAL

## 2019-11-01 ASSESSMENT — VISUAL ACUITY
OS_SC+: -3
METHOD: SNELLEN - LINEAR
OD_SC+: -1
OD_SC: 20/20
OS_SC: 20/20

## 2019-11-01 ASSESSMENT — TONOMETRY
IOP_METHOD: APPLANATION
OD_IOP_MMHG: 15

## 2019-11-01 ASSESSMENT — EXTERNAL EXAM - RIGHT EYE: OD_EXAM: NORMAL

## 2019-11-01 ASSESSMENT — EXTERNAL EXAM - LEFT EYE: OS_EXAM: NORMAL

## 2019-11-01 NOTE — PATIENT INSTRUCTIONS
Discontinue ofloxacin.  Continue prednisolone acetate and ketorolac for the full 21 days.    Ok to discontinue shield while sleeping.  All restrictions are lifted.    Keep follow up appointments as scheduled.    Contact your pharmacy to refill drops if needed.    Return in 3 weeks for final refraction.  Your pupils will be dilated at that visit.         Please continue any glaucoma, dry eye, or other medications you were using prior to the surgery.        Ariel Greene, ANNI  [facility]  [address] [place] [address]  Tel- [phone]

## 2019-11-01 NOTE — PROGRESS NOTES
CHIEF COMPLAINT:   Chief Complaint   Patient presents with     Cataract Follow-Up     1 week post op od eye       Type of surgery cataract   Date of surgery 10- od eye                             09- os eye    Patient states with or without glasses eyes go crossed eyed     Queenie Shine, Optometric Assistant, A.B.O.C.     Drops reviewed.    OBJECTIVE:     See ophthalmology exam    ASSESSMENT:         ICD-10-CM    1. Pseudophakia of right eye Z96.1      PLAN:      Patient Instructions   Discontinue ofloxacin.  Continue prednisolone acetate and ketorolac for the full 21 days.    Ok to discontinue shield while sleeping.  All restrictions are lifted.    Keep follow up appointments as scheduled.    Contact your pharmacy to refill drops if needed.    Return in 3 weeks for final refraction.  Your pupils will be dilated at that visit.         Please continue any glaucoma, dry eye, or other medications you were using prior to the surgery.        Ariel Greene, OD  [facility]  [address] [place] [address]  Tel- [phone]

## 2019-11-13 NOTE — TELEPHONE ENCOUNTER
2nd attempt  Left message for patient to return clinic call regarding scheduling. Patient needs a Return  appointment for 6 month recheck with Tiffany King MD in late December. Number to clinic and Mychart option given, please assist in scheduling once patient returns clinic call.     Call Center OKAY TO SCHEDULE.    Thanks,   Arline Willis  Primary Care   [facility] [place]

## 2019-11-20 NOTE — TELEPHONE ENCOUNTER
3rd attempt, chart letter created and sent.    Arline Willis  Primary Care   [facility] [place]

## 2019-11-25 DIAGNOSIS — M25.562 ARTHRALGIA OF BOTH KNEES: ICD-10-CM

## 2019-11-25 DIAGNOSIS — M17.10 ARTHRITIS OF KNEE: ICD-10-CM

## 2019-11-25 DIAGNOSIS — M25.561 ARTHRALGIA OF BOTH KNEES: ICD-10-CM

## 2019-11-25 NOTE — TELEPHONE ENCOUNTER
Tramadol   Last Written Prescription Date:10/25/2019    Last Fill Quantity: 45,   # refills: 0  Last Office Visit: 09/06/2019  Future Office visit:    Next 5 appointments (look out 90 days)    Dec 04, 2019 11:20 AM CST  Return Visit with Ariel Greene OD  [facility] [address] [place] [address] [id number] [phone]   Dec 10, 2019 11:50 AM CST  Return Visit with Tiffany King MD PhD  [facility] [address] [place] [address] [id number] [phone]           Routing refill request to provider for review/approval because:  Drug not on the [facility], [facility] or [facility] refill protocol or controlled substance    Berenice JESUS CMA

## 2019-11-26 RX ORDER — TRAMADOL HYDROCHLORIDE 50 MG/1
TABLET ORAL
Qty: 45 TABLET | Refills: 0 | Status: SHIPPED | OUTPATIENT
Start: 2019-11-26 | End: 2020-01-07

## 2019-12-04 ENCOUNTER — OFFICE VISIT (OUTPATIENT)
Dept: OPTOMETRY | Facility: CLINIC | Age: 64
End: 2019-12-04
Payer: MEDICARE

## 2019-12-04 DIAGNOSIS — Z96.1 PSEUDOPHAKIA OF BOTH EYES: Primary | ICD-10-CM

## 2019-12-04 PROCEDURE — 99024 POSTOP FOLLOW-UP VISIT: CPT | Performed by: OPTOMETRIST

## 2019-12-04 ASSESSMENT — SLIT LAMP EXAM - LIDS
COMMENTS: NORMAL
COMMENTS: NORMAL

## 2019-12-04 ASSESSMENT — REFRACTION_WEARINGRX
OD_SPHERE: +1.00
OD_ADD: +2.50
OS_AXIS: 180
OD_VPRISM: 1.5
OS_VPRISM: 1.5
OD_CYLINDER: +0.50
OS_ADD: +2.50
OS_SPHERE: +0.50
OD_VBASE: DOWN
OS_VBASE: UP
OS_CYLINDER: +1.00
OD_AXIS: 015

## 2019-12-04 ASSESSMENT — TONOMETRY
IOP_METHOD: TONOPEN
OS_IOP_MMHG: 10
OD_IOP_MMHG: 15

## 2019-12-04 ASSESSMENT — VISUAL ACUITY
OS_SC: 20/20
METHOD: SNELLEN - LINEAR
OS_SC+: -2
OD_SC: 20/20

## 2019-12-04 ASSESSMENT — CUP TO DISC RATIO
OD_RATIO: 0.2
OS_RATIO: 0.2

## 2019-12-04 ASSESSMENT — EXTERNAL EXAM - RIGHT EYE: OD_EXAM: NORMAL

## 2019-12-04 ASSESSMENT — REFRACTION_MANIFEST
OD_ADD: +2.50
OS_ADD: +2.50
OD_SPHERE: PLANO
OS_SPHERE: +0.25

## 2019-12-04 ASSESSMENT — EXTERNAL EXAM - LEFT EYE: OS_EXAM: NORMAL

## 2019-12-04 NOTE — PROGRESS NOTES
CHIEF COMPLAINT:   Chief Complaint   Patient presents with     Cataract Follow-Up     Final refraction       Type of surgery cataract   Date of surgery 10- od eye                               9- os eye    Queenie Shine, Optometric Assistant, A.B.O.C.     Drops reviewed.    OBJECTIVE:     See ophthalmology exam    ASSESSMENT:         ICD-10-CM    1. Pseudophakia of both eyes Z96.1 POST-OP FOLLOW-UP VISIT   Good results both eyes     PLAN:      Patient Instructions   Eyeglass prescription given.    Return in 1 year for a complete eye exam or sooner if needed.    Ariel Greene, OD

## 2019-12-04 NOTE — PATIENT INSTRUCTIONS
Eyeglass prescription given.    Return in 1 year for a complete eye exam or sooner if needed.    Ariel Greene, OD

## 2019-12-06 NOTE — TELEPHONE ENCOUNTER
blood glucose monitoring (ACCU-CHEK CELIA) test strip       Last Written Prescription Date:  12/3/18  Last Fill Quantity: 600,   # refills: 1  Last Office Visit : 9/20/19  Future Office visit:  3/27/20    Routing refill request to provider for review/approval because:  frequency not active on patient's medication list.

## 2019-12-10 ENCOUNTER — OFFICE VISIT (OUTPATIENT)
Dept: PEDIATRICS | Facility: CLINIC | Age: 64
End: 2019-12-10
Payer: MEDICARE

## 2019-12-10 VITALS
HEART RATE: 74 BPM | DIASTOLIC BLOOD PRESSURE: 68 MMHG | WEIGHT: 218 LBS | TEMPERATURE: 97.8 F | SYSTOLIC BLOOD PRESSURE: 121 MMHG | OXYGEN SATURATION: 97 % | BODY MASS INDEX: 39.87 KG/M2

## 2019-12-10 DIAGNOSIS — E11.649 UNCONTROLLED TYPE 2 DIABETES MELLITUS WITH HYPOGLYCEMIA WITHOUT COMA (H): Primary | ICD-10-CM

## 2019-12-10 DIAGNOSIS — R33.9 URINARY RETENTION: ICD-10-CM

## 2019-12-10 DIAGNOSIS — Z12.31 ENCOUNTER FOR SCREENING MAMMOGRAM FOR BREAST CANCER: ICD-10-CM

## 2019-12-10 LAB — HBA1C MFR BLD: 7.7 % (ref 0–5.6)

## 2019-12-10 PROCEDURE — 99214 OFFICE O/P EST MOD 30 MIN: CPT | Performed by: INTERNAL MEDICINE

## 2019-12-10 PROCEDURE — 36415 COLL VENOUS BLD VENIPUNCTURE: CPT | Performed by: INTERNAL MEDICINE

## 2019-12-10 PROCEDURE — 83036 HEMOGLOBIN GLYCOSYLATED A1C: CPT | Performed by: INTERNAL MEDICINE

## 2019-12-10 NOTE — PATIENT INSTRUCTIONS
Make appointment(s) for:   -- get A1c done in the lab toady.   -- urology.  -- mammogram.  -- clinic follow up in 6 months.      1. Talk with Dr. Harrington about your back pain.   2. Check with your pharmacist regarding:  Tdap and Shingrix vaccines coverage.

## 2019-12-10 NOTE — RESULT ENCOUNTER NOTE
Mikayla,  Can you get her in to see you or get her glucose downloads? She told me she has frequent low sugar at night time. She didn't bring her glucometer. Her A1c is better though. She is not scheduled with Dr. Medrano until March. Thanks! Dr. King 170

## 2019-12-10 NOTE — PROGRESS NOTES
Subjective     Holly Muir is a 64 year old female who presents to clinic today for the following health issues:    HPI   Diabetes Follow-up    How often are you checking your blood sugar? Three times daily,  She has had ranges from 60-70, as well as above 300s (when she was sick), now better, has numbers from 135-mid 200s. She didn't bring her glucometer. She eats peanut butter at night because her sugar drops. 1-3 times a week at bedtime, she would feel shaky, and then has to eat peanut better to get out of low sugar.   Blood sugar testing frequency justification:  Frequent hypoglycemia and Uncontrolled diabetes  What time of day are you checking your blood sugars (select all that apply)?  Before meals  Have you had any blood sugars above 200?  Yes   Have you had any blood sugars below 70?  Yes     What symptoms do you notice when your blood sugar is low?  Weak and sweaty, nauseated     What concerns do you have today about your diabetes? None     Do you have any of these symptoms? (Select all that apply)  Numbness in feet     Have you had a diabetic eye exam in the last 12 months? Yes- Date of last eye exam: 11-1-19 [facility]    BP Readings from Last 2 Encounters:   12/10/19 121/68   10/24/19 (!) 140/58     Hemoglobin A1C (%)   Date Value   07/29/2019 8.3 (H)   05/10/2019 11.9 (A)     LDL Cholesterol Calculated (mg/dL)   Date Value   12/14/2018 25   12/19/2016 21       Diabetes Management Resources      How many servings of fruits and vegetables do you eat daily?  2-3    On average, how many sweetened beverages do you drink each day (Examples: soda, juice, sweet tea, etc.  Do NOT count diet or artificially sweetened beverages)?   2    How many days per week do you miss taking your medication? 0      Had a sore on the right thigh about 1 month ago. It is getting better.     She normally follows with endocrine for diabetes. She didn't bring her meter for review.     Reported history of Interstim placement for  urinary incontinence in 2015. It didn't work. She is using diapers. However, since she got the Interstim, she has had back pain. She feels that the wire from Interstim is causing it. She would like it removed.     She sees sports medicine for back pain.       ROS:  Constitutional, HEENT, cardiovascular, pulmonary, gi and gu systems are negative, except as otherwise noted.       ARIPiprazole (ABILIFY) 30 MG tablet, Take 1 tablet (30 mg) by mouth daily  ARIPiprazole (ABILIFY) 5 MG tablet, Take 1 tablet (5 mg) by mouth At Bedtime Along with 5mg tab to total 25mg.  Prescribed by Dr. Isael Jiménez at [facility]  aspirin ([facility] ASPIRIN ADULT LOW DOSE) 81 MG chewable tablet, TAKE 1 TABLET (81 MG) BY MOUTH DAILY  buPROPion (WELLBUTRIN XL) 300 MG 24 hr tablet, TAKE 1 TABLET EVERY DAY. (TOTAL DAILY DOSE = 300MG)  busPIRone HCl (BUSPAR) 30 MG tablet, TAKE 1 TABLET BY MOUTH TWICE A DAY  [facility] PAIN RELIEF 500 MG tablet, TAKE 2 TABLETS BY MOUTH EVERY 8 HOURS. MAX ACETAMINOPHEN DOSE: 4000MG IN 24 HRS.  gabapentin (NEURONTIN) 600 MG tablet, Take 1 tablet (600 mg) by mouth 3 times daily  ibuprofen (ADVIL/MOTRIN) 400 MG tablet, Take 1 tablet (400 mg) by mouth every 8 hours as needed for moderate pain (with food)  insulin aspart (NOVOLOG FLEXPEN) 100 UNIT/ML pen, 15 unit subcutaneous per meal three times a day.  insulin glargine (BASAGLAR KWIKPEN) 100 UNIT/ML pen, Take 55 units daily.  metFORMIN (GLUCOPHAGE) 1000 MG tablet, Take 1 tablet (1,000 mg) by mouth 2 times daily (with meals)  Multiple Vitamin (DAILY-ISAAC) TABS, TAKE 1 TABLET BY MOUTH DAILY  nystatin (MYCOSTATIN) 636357 UNIT/GM external powder, Apply 1 dose topically 3 times daily as needed  omeprazole (PRILOSEC) 20 MG DR capsule, TAKE 2 CAPSULES BY MOUTH DAILY TAKE 30-60 MINUTES BEFORE A MEAL.  order for DME, Equipment being ordered: Knee walker   order for DME, by Device route continuous Air walker -  Use as instructed  ORDER FOR DME, Equipment being ordered:   Ostomy supplies.  Drain Pouch by Valarie #.    Also needs Barrier by Valarie #.  ORDER FOR DME, Equipment being ordered: Cane  PARoxetine (PAXIL) 20 MG tablet, TAKE 2 TABLETS AT BEDTIME (Prescribed by Dr. Isael Jiménez at [facility])  pioglitazone (ACTOS) 45 MG tablet, Take 1 tablet (45 mg) by mouth daily  propranolol ER (INDERAL LA) 60 MG 24 hr capsule, Take 1 capsule (60 mg) by mouth daily  ramipril (ALTACE) 5 MG capsule, Take 1 capsule (5 mg) by mouth daily  salicylic acid (MEDIPLAST) 40 % miscellaneous, Apply to warts nightly as tolerated.  simvastatin (ZOCOR) 20 MG tablet, Take 1 tablet (20 mg) by mouth At Bedtime  traMADol (ULTRAM) 50 MG tablet, TAKE 1/2 TABLET BY MOUTH 3 TIMES A DAY  traZODone (DESYREL) 50 MG tablet, Take 50 mg by mouth At Bedtime  vitamin D3 (CHOLECALCIFEROL) 2000 units (50 mcg) tablet, TAKE 1 TABLET BY MOUTH DAILY  albuterol (PROAIR HFA/PROVENTIL HFA/VENTOLIN HFA) 108 (90 Base) MCG/ACT inhaler, Inhale 2 puffs into the lungs every 6 hours (Patient not taking: Reported on 12/10/2019)  blood glucose monitoring (ACCU-CHEK CELIA PLUS) meter device kit, Use to test blood sugars 4 times daily or as directed.  blood glucose monitoring (ACCU-CHEK FASTCLIX) lancets, Use to test blood sugar 6 times daily or as directed.  insulin pen needle 31G X 5 MM, Use four times 4 day times a day  order for DME, Equipment being ordered: grab bar for bath tub/shower. (Patient not taking: Reported on 12/10/2019)  order for DME, Equipment being ordered: railing for stairs at home. (Patient not taking: Reported on 12/10/2019)  UNIVERSAL REMOVER WIPES EX MISC, uses to remove barrier from colostomy bag at time of change    triamcinolone (KENALOG-40) injection 40 mg  triamcinolone (KENALOG-40) injection 40 mg  triamcinolone (KENALOG-40) injection 40 mg  triamcinolone (KENALOG-40) injection 40 mg           Patient Active Problem List   Diagnosis     Hyperlipidemia LDL goal <100     Severe major  depression with psychotic features (H)     History of cervical cancer     S/P colostomy (H)     Mixed incontinence urge and stress     Memory loss     Type 2 diabetes mellitus, uncontrolled A1C goal <8%     Obesity     Pain in joint, hand     Trigger finger, acquired     Synovitis and tenosynovitis     Dermatochalasis     Presbyopia     OCD (obsessive compulsive disorder)     HTN, goal below 140/90     Cataracts, both eyes     Health Care Home     Lumbago     Sprain of lumbar region     Generalized osteoarthrosis, unspecified site     Rotator cuff impingement syndrome     Hypoglycemia due to insulin     Encounter for long-term (current) use of insulin (H)     Colostomy, evaluate (H)     Urinary retention     ACP (advance care planning)     Bilateral low back pain without sciatica     Anxiety     Fecal incontinence due to anorectal disorder     Visual hallucination     Type 2 diabetes mellitus with hyperglycemia (H)     Chronic pain syndrome     Obesity (BMI 35.0-39.9) with comorbidity (H)     Anal stenosis     S/P partial colectomy     Anemia     Chest pain, musculoskeletal     Diabetic neuropathy (H)     GERD (gastroesophageal reflux disease)     Hyponatremia     Schizoaffective disorder (H)     Pseudophakia of right eye     Past Surgical History:   Procedure Laterality Date     ARTHROSCOPY KNEE RT/LT      LT     BLEPHAROPLASTY BILATERAL Bilateral 8/16/2019    Procedure: BILATERAL UPPER LID BLEPHAROPLASTY;  Surgeon: Randolph Cook MD;  Location: [facility] OR     BREAST LUMPECTOMY, RT/LT      LT-benign      C EXCIS PTERYGIUM  10/08    [facility]     C STOMACH SURGERY PROCEDURE UNLISTED       CATARACT IOL, RT/LT       COLONOSCOPY  5/10/2012    Procedure:COLONOSCOPY; screening colonoscopy; Surgeon:MILLA VEGA; Location:[facility] OR     COLONOSCOPY WITH CO2 INSUFFLATION N/A 8/2/2019    Procedure: Colonoscopy with CO2 insufflation;  Surgeon: Himanshu Hi MD;  Location: [facility] OR     COLOSTOMY  2000      COLOSTOMY       [facility] COLONOSCOPY THRU STOMA, DIAGNOSTIC  2002    normal per report-no records available-records destroyed     HYSTERECTOMY, PAP NO LONGER INDICATED      done in [place]-cervical cancer     IMPLANT STIMULATOR SACRAL NERVE STAGE ONE Right 3/10/2015    Procedure: IMPLANT STIMULATOR SACRAL NERVE STAGE ONE;  Surgeon: Teodora Yusuf MD;  Location: [facility]     IMPLANT STIMULATOR SACRAL NERVE STAGE TWO Right 3/31/2015    Procedure: IMPLANT STIMULATOR SACRAL NERVE STAGE TWO;  Surgeon: Teodora Yusuf MD;  Location: [facility]     PHACOEMULSIFICATION WITH STANDARD INTRAOCULAR LENS IMPLANT Left 9/26/2019    Procedure: LEFT PHACOEMULSIFICATION, CATARACT, WITH STANDARD IOL INSERTION;  Surgeon: Francesco Winn MD;  Location: [facility]     PHACOEMULSIFICATION WITH STANDARD INTRAOCULAR LENS IMPLANT Right 10/24/2019    Procedure: RIGHT PHACOEMULSIFICATION, CATARACT, WITH STANDARD IOL INSERTION;  Surgeon: Francesco Winn MD;  Location: [facility] OR     REPAIR PTOSIS Bilateral 08/16/2019     SALPINGO OOPHORECTOMY,R/L/JENNIFER      Salpingo Oophorectomy, RT/LT/JENNIFER       Social History     Tobacco Use     Smoking status: Former Smoker     Packs/day: 0.00     Last attempt to quit: 8/9/2016     Years since quitting: 3.3     Smokeless tobacco: Never Used   Substance Use Topics     Alcohol use: Yes     Alcohol/week: 0.0 standard drinks     Comment: social occasions     Family History   Problem Relation Age of Onset     Diabetes Mother      Cerebrovascular Disease Mother      Diabetes Father      Hypertension Father      Cancer Maternal Grandfather      Cancer Paternal Grandfather      Diabetes Brother      Diabetes Sister      Thyroid Disease Daughter      Thyroid Disease Sister      Multiple Sclerosis Daughter      Glaucoma No family hx of      Macular Degeneration No family hx of              Problem list, Medication list, Allergies, and Medical/Social/Surgical histories reviewed in EPIC and updated as  appropriate.    OBJECTIVE:                                                    /68   Pulse 74   Temp 97.8  F (36.6  C) (Temporal)   Wt 98.9 kg (218 lb)   SpO2 97%   BMI 39.87 kg/m      GENERAL: healthy, alert and no distress    Diagnostic test results:  Results for orders placed or performed in visit on 12/10/19   Hemoglobin A1c     Status: Abnormal   Result Value Ref Range    Hemoglobin A1C 7.7 (H) 0 - 5.6 %         ASSESSMENT/PLAN:                                                      64 year old female with the following diagnoses and treatment plan:      ICD-10-CM    1. Uncontrolled type 2 diabetes mellitus with hypoglycemia without coma (H) E11.649 Hemoglobin A1c   2. Encounter for screening mammogram for breast cancer Z12.31 CANCELED: [facility] Screening Digital Bilateral   3. Urinary retention R33.9 UROLOGY ADULT REFERRAL       -- diabetes: with frequent hypoglycemic episodes. She didn't bring her meter to review. I am not able to advice her further on dose adjustment. I will connect with our diabetes educator Mikayla Sellers who has been working with her and her endocrinologist to get her in to review her meter. Will check her A1c today for a baseline  -- back pain and history of Interstim placement: will have her see urology about  Removing the wire, sports medicine to see if the back pain is more DJD rather than from the Interstim.   -- Health maintenance: due for mammogram, ordered.  -- Shingrix (RZV) advised. Pt will check insurance coverage.    -- return in 6 months for chronic disease review.       Will call or return to clinic if worsening or symptoms not improving as discussed.  See Patient Instructions.    A total of 25 minutes was spent face to face with this patient. More than 50% of the time was spent on education for the above problems and management plans.       Tiffany King MD-PhD  [facility]    (Note: Chart documentation was done in part with Dragon Voice Recognition software.  Although reviewed after completion, some word and grammatical errors may remain.)

## 2019-12-11 ENCOUNTER — TELEPHONE (OUTPATIENT)
Dept: PEDIATRICS | Facility: CLINIC | Age: 64
End: 2019-12-11

## 2019-12-11 RX ORDER — PAROXETINE 20 MG/1
60 TABLET, FILM COATED ORAL EVERY MORNING
COMMUNITY
Start: 2019-12-11 | End: 2020-06-05

## 2019-12-11 RX ORDER — BUPROPION HYDROCHLORIDE 150 MG/1
150 TABLET, EXTENDED RELEASE ORAL 2 TIMES DAILY
COMMUNITY
Start: 2019-12-11 | End: 2020-08-29

## 2019-12-11 NOTE — TELEPHONE ENCOUNTER
[facility] Call Center    Phone Message    May a detailed message be left on voicemail: yes    Reason for Call: Other: Lesvia is requesting recertification orders for weekly nurse visits for medication management. Please call [phone] with verbal orders.   Lesvia also wanted to update Dr. King medication changes that were made by psychiatry on 11/25/19.    PARoxetine (PAXIL) - was adjusted to 3 tabs a day  buPROPion (WELLBUTRIN XL) 300 MG 24 hr tablet [88809] (Order [id number])- was decreased to 150MG        Action Taken: [id number]

## 2019-12-17 ENCOUNTER — ALLIED HEALTH/NURSE VISIT (OUTPATIENT)
Dept: EDUCATION SERVICES | Facility: CLINIC | Age: 64
End: 2019-12-17
Payer: MEDICARE

## 2019-12-17 ENCOUNTER — OFFICE VISIT (OUTPATIENT)
Dept: ORTHOPEDICS | Facility: CLINIC | Age: 64
End: 2019-12-17
Payer: MEDICARE

## 2019-12-17 ENCOUNTER — ANCILLARY PROCEDURE (OUTPATIENT)
Dept: MAMMOGRAPHY | Facility: CLINIC | Age: 64
End: 2019-12-17
Attending: INTERNAL MEDICINE
Payer: MEDICARE

## 2019-12-17 VITALS
SYSTOLIC BLOOD PRESSURE: 120 MMHG | BODY MASS INDEX: 40.12 KG/M2 | WEIGHT: 218 LBS | DIASTOLIC BLOOD PRESSURE: 68 MMHG | HEIGHT: 62 IN

## 2019-12-17 DIAGNOSIS — M54.50 CHRONIC BILATERAL LOW BACK PAIN WITHOUT SCIATICA: ICD-10-CM

## 2019-12-17 DIAGNOSIS — E11.9 TYPE 2 DIABETES MELLITUS (H): Primary | ICD-10-CM

## 2019-12-17 DIAGNOSIS — M17.12 PRIMARY OSTEOARTHRITIS OF LEFT KNEE: Primary | ICD-10-CM

## 2019-12-17 DIAGNOSIS — G89.29 CHRONIC BILATERAL LOW BACK PAIN WITHOUT SCIATICA: ICD-10-CM

## 2019-12-17 DIAGNOSIS — E11.9 DIABETES MELLITUS WITHOUT COMPLICATION (H): Primary | ICD-10-CM

## 2019-12-17 DIAGNOSIS — Z12.31 ENCOUNTER FOR SCREENING MAMMOGRAM FOR BREAST CANCER: ICD-10-CM

## 2019-12-17 PROCEDURE — 99207 ZZC DROP WITH A PROCEDURE: CPT

## 2019-12-17 PROCEDURE — G0108 DIAB MANAGE TRN  PER INDIV: HCPCS

## 2019-12-17 PROCEDURE — 77063 BREAST TOMOSYNTHESIS BI: CPT

## 2019-12-17 PROCEDURE — 77067 SCR MAMMO BI INCL CAD: CPT

## 2019-12-17 PROCEDURE — 99214 OFFICE O/P EST MOD 30 MIN: CPT | Performed by: FAMILY MEDICINE

## 2019-12-17 ASSESSMENT — MIFFLIN-ST. JEOR: SCORE: 1497.09

## 2019-12-17 ASSESSMENT — PAIN SCALES - GENERAL: PAINLEVEL: SEVERE PAIN (7)

## 2019-12-17 NOTE — PATIENT INSTRUCTIONS
1. Keep Basaglar dose the same - take 55 units daily.    2. Novolog: Take based on pre-meal Blood sugar level:    If pre meal Bg is < 130: take 10 units.   If pre meal Bg is 130-170: take 12 units  If pre meal Bg is 170-200: take 14 units  If pre-meal Bg is > 200: take 16 units

## 2019-12-17 NOTE — PROGRESS NOTES
"HISTORY OF PRESENT ILLNESS  Ms. Muir is a pleasant 64 year old year old female following up with left knee pain.  Holly last saw me in October of this year, at that visit I injected both of her knees with corticosteroid.  Although her right knee feels great, her left knee never got relief from that injection.  She has continued to have pain with most steps, throughout every day.  It seems as if it is getting worse.  She would like to specifically discuss a replacement.  Holly also has a new issue that she would like to discuss.  Her low back has been bothering her for years.  She points to the center of her low back, just at her waistline.  She was seen by her primary care office in the past for this, she has had an x-ray and was given NSAIDs.  She has also tried exercise on her own, relative rest, and other conservative modalities.     PHYSICAL EXAM  Vitals:    12/17/19 0949   BP: 120/68   Weight: 98.9 kg (218 lb)   Height: 1.575 m (5' 2\")     General  - normal appearance, in no obvious distress  CV  - normal peripheral perfusion  Pulm  - normal respiratory pattern, non-labored  Musculoskeletal - lumbar spine  - stance: slow to rise and sit  - inspection: normal bone and joint alignment, no obvious scoliosis  - palpation: bilateral lumbar paravertebral spasm  - ROM: pain with bilateral rotation, flexion past 30 deg, extension  - strength: lower extremities 5/5 in all planes  - special tests:  (-) straight leg raise bilaterally  (-) slump test  Neuro  - patellar and Achilles DTRs 2+ bilaterally, no sensory or motor deficit, grossly normal coordination, normal muscle tone  Skin  - no ecchymosis, erythema, warmth, or induration, no obvious rash  Psych  - interactive, appropriate, normal mood and affect        ASSESSMENT & PLAN  Ms. Muir is a 64 year old year old female following up with osteoarthritis of her left knee.  She also has low back pain that is chronic in nature.    Holly and I revisited our discussion " centering around the spectrum of treatment options for osteoarthritis that preclude surgery.  We discussed nonoperative treatment with pain relievers, icing, low impact exercise, and weight loss.  We also talked about the role of injection therapy with corticosteroids and hyaluronic acid, as well as the potential role of biologics.  We also discussed surgical indications.  Given her failure of conservative therapy thus far I do think it is reasonable to contemplate total knee replacement, I am referring her to our partners to have this discussion.    With regards to her low back, I reviewed her most recent x-ray in the room with her.  This is from 2017 and does show osteoarthritic change and disc space narrowing at L4-5 and L5-S1.    I am referring her to physical therapy for her back, I do think this will help regardless of our ultimate treatment path.  I am also ordering an MR.  If her MRI shows that she may be amenable to an injection I may order this.    It was a pleasure seeing Holly.        Santy Harrington, DO, CAQSM      This note was constructed using Dragon dictation software, please excuse any minor errors in spelling, grammar, or syntax.

## 2019-12-17 NOTE — LETTER
"    12/17/2019         RE: Holly Muir  [address] [facility] [id number]        Dear Colleague,    Thank you for referring your patient, Holly Muir, to the [facility]. Please see a copy of my visit note below.    HISTORY OF PRESENT ILLNESS  Ms. Muir is a pleasant 64 year old year old female following up with left knee pain.  Holly last saw me in October of this year, at that visit I injected both of her knees with corticosteroid.  Although her right knee feels great, her left knee never got relief from that injection.  She has continued to have pain with most steps, throughout every day.  It seems as if it is getting worse.  She would like to specifically discuss a replacement.  Holly also has a new issue that she would like to discuss.  Her low back has been bothering her for years.  She points to the center of her low back, just at her waistline.  She was seen by her primary care office in the past for this, she has had an x-ray and was given NSAIDs.  She has also tried exercise on her own, relative rest, and other conservative modalities.     PHYSICAL EXAM  Vitals:    12/17/19 0949   BP: 120/68   Weight: 98.9 kg (218 lb)   Height: 1.575 m (5' 2\")     General  - normal appearance, in no obvious distress  CV  - normal peripheral perfusion  Pulm  - normal respiratory pattern, non-labored  Musculoskeletal - lumbar spine  - stance: slow to rise and sit  - inspection: normal bone and joint alignment, no obvious scoliosis  - palpation: bilateral lumbar paravertebral spasm  - ROM: pain with bilateral rotation, flexion past 30 deg, extension  - strength: lower extremities 5/5 in all planes  - special tests:  (-) straight leg raise bilaterally  (-) slump test  Neuro  - patellar and Achilles DTRs 2+ bilaterally, no sensory or motor deficit, grossly normal coordination, normal muscle tone  Skin  - no ecchymosis, erythema, warmth, or induration, no obvious rash  Psych  - " "interactive, appropriate, normal mood and affect        ASSESSMENT & PLAN  Ms. Muir is a 64 year old year old female following up with osteoarthritis of her left knee.  She also has low back pain that is chronic in nature.    Holly and I revisited our discussion centering around the spectrum of treatment options for osteoarthritis that preclude surgery.  We discussed nonoperative treatment with pain relievers, icing, low impact exercise, and weight loss.  We also talked about the role of injection therapy with corticosteroids and hyaluronic acid, as well as the potential role of biologics.  We also discussed surgical indications.  Given her failure of conservative therapy thus far I do think it is reasonable to contemplate total knee replacement, I am referring her to our partners to have this discussion.    With regards to her low back, I reviewed her most recent x-ray in the room with her.  This is from 2017 and does show osteoarthritic change and disc space narrowing at L4-5 and L5-S1.    I am referring her to physical therapy for her back, I do think this will help regardless of our ultimate treatment path.  I am also ordering an MR.  If her MRI shows that she may be amenable to an injection I may order this.    It was a pleasure seeing Holly.        Santy Harrington DO, CAKEVANM      This note was constructed using Dragon dictation software, please excuse any minor errors in spelling, grammar, or syntax.            [facility] FOLLOW-UP VISIT 12/17/2019    Holly Muir's chief complaint for this visit includes:  Chief Complaint   Patient presents with     RECHECK     left knee, last cortisone injection in 10/2019 helped for the right knee, but not the left.      PCP: Tiffany King    Referring Provider:  No referring provider defined for this encounter.    /68   Ht 1.575 m (5' 2\")   Wt 98.9 kg (218 lb)   BMI 39.87 kg/m     Severe Pain (7)       Interval History:     Follow up reason: left " knee pain     Following Therapeutic Plan: Yes     Pain: Unchanged    Function: Unchanged      Medical History:    Any recent changes to your medical history? No    Any new medication prescribed since last visit? No    Review of Systems:    Do you have fever, chills, weight loss? No    Do you have any vision problems? No    Do you have any chest pain or edema? No    Do you have any shortness of breath or wheezing?  No    Do you have stomach problems? No    Do you have any numbness or focal weakness? No    Do you have diabetes? Yes,     Do you have problems with bleeding or clotting? No    Do you have an rashes or other skin lesions? No      Again, thank you for allowing me to participate in the care of your patient.        Sincerely,        Santy Harrington, DO

## 2019-12-17 NOTE — PROGRESS NOTES
"      [facility] FOLLOW-UP VISIT 12/17/2019    Holly Muir's chief complaint for this visit includes:  Chief Complaint   Patient presents with     RECHECK     left knee, last cortisone injection in 10/2019 helped for the right knee, but not the left.      PCP: Tiffany King    Referring Provider:  No referring provider defined for this encounter.    /68   Ht 1.575 m (5' 2\")   Wt 98.9 kg (218 lb)   BMI 39.87 kg/m    Severe Pain (7)       Interval History:     Follow up reason: left knee pain     Following Therapeutic Plan: Yes     Pain: Unchanged    Function: Unchanged      Medical History:    Any recent changes to your medical history? No    Any new medication prescribed since last visit? No    Review of Systems:    Do you have fever, chills, weight loss? No    Do you have any vision problems? No    Do you have any chest pain or edema? No    Do you have any shortness of breath or wheezing?  No    Do you have stomach problems? No    Do you have any numbness or focal weakness? No    Do you have diabetes? Yes,     Do you have problems with bleeding or clotting? No    Do you have an rashes or other skin lesions? No    "

## 2019-12-17 NOTE — PROGRESS NOTES
Diabetes Self Management Training: Follow-up Visit    Holly Muir presents today for evaluation of glucose control related to Type 2 diabetes.    She is accompanied by self    Patient's diabetes management related comments/concerns: None at this time.     Patient's emotional response to diabetes: expresses readiness to learn    Patient would like this visit to be focused around the following diabetes-related behaviors and goals: Taking Medication    ASSESSMENT:  Patient presents to clinic to meet with Cde per recommendation of her pcp for bg evaluation. Pt c/o recent issues with hypoglycemia pre and post dinner. Pt is pro-active with her care. A1c levels have significantly improved over the past six months: 12/10/19: 7.7,  19: 8.3,  05/10/19: 11.9. Pt praised for being pro-active with care.     Current Diabetes Management per Patient:  Taking diabetes medications? yes: see below      Diabetes Medication(s)     Biguanides       metFORMIN (GLUCOPHAGE) 1000 MG tablet    Take 1 tablet (1,000 mg) by mouth 2 times daily (with meals)    Insulin       insulin aspart (NOVOLOG FLEXPEN) 100 UNIT/ML pen    15 unit subcutaneous per meal three times a day.     insulin glargine (BASAGLAR KWIKPEN) 100 UNIT/ML pen    Take 55 units daily.    Insulin Sensitizing Agents       pioglitazone (ACTOS) 45 MG tablet    Take 1 tablet (45 mg) by mouth daily          Do you have any difficulty affording your medications or glucose monitoring supplies?  No.     Patient glucose self monitoring as follows: Ave of 2x daily.    BG meter: Accu-Chek Expert  BG Results: Per meter download for one month:  Ave B. Highest value: 416. Lowest value: 60.   Occasional pattern of pre and post dinner hypoglycemia.      BG values are: Not in goal, but improving.  Patient's most recent   Lab Results   Component Value Date    A1C 7.7 12/10/2019    is not meeting goal of < 6.5.     Nutrition:  Not discussed today.     Physical Activity:    Not  "discussed today.     Diabetes Risk Factors:  Age over 45 years  Overweight/obesity  Inactivity    Relevant co-morbidities and related health problems:  Significant for:  Mental/affective disorder - depression  Insomnia    Diabetes Complications:  Not discussed today.    Recent health service and resource utilization related to diabetes (hyperglycemia, hypoglycemia, etc):   None    Vitals:  There were no vitals taken for this visit.  Estimated body mass index is 39.87 kg/m  as calculated from the following:    Height as of an earlier encounter on 12/17/19: 1.575 m (5' 2\").    Weight as of an earlier encounter on 12/17/19: 98.9 kg (218 lb).   Last 3 BP:   BP Readings from Last 3 Encounters:   12/17/19 120/68   12/10/19 121/68   10/24/19 (!) 140/58       History   Smoking Status     Former Smoker     Packs/day: 0.00     Quit date: 8/9/2016   Smokeless Tobacco     Never Used       Labs:  Lab Results   Component Value Date    A1C 7.7 12/10/2019     Lab Results   Component Value Date     07/29/2019     Lab Results   Component Value Date    LDL 25 12/14/2018     HDL Cholesterol   Date Value Ref Range Status   12/14/2018 68 >49 mg/dL Final   ]  GFR Estimate   Date Value Ref Range Status   07/29/2019 89 >60 mL/min/[1.73_m2] Final     Comment:     Non  GFR Calc  Starting 12/18/2018, serum creatinine based estimated GFR (eGFR) will be   calculated using the Chronic Kidney Disease Epidemiology Collaboration   (CKD-EPI) equation.       GFR Estimate If Black   Date Value Ref Range Status   07/29/2019 >90 >60 mL/min/[1.73_m2] Final     Comment:      GFR Calc  Starting 12/18/2018, serum creatinine based estimated GFR (eGFR) will be   calculated using the Chronic Kidney Disease Epidemiology Collaboration   (CKD-EPI) equation.       Lab Results   Component Value Date    CR 0.72 07/29/2019     No results found for: MICROALBUMIN    Socio/Economic/Cultural Considerations:    Support system: " "family    Cultural Influences/Ethnic Background:  American      Health Literacy/Numeracy:  \"With diabetes, it's helpful to use forms and log books to write down blood sugars and what you're eating at times to help understand how foods affect your blood sugars. With this in mind how confident are you at filling out medical forms, such as these, by yourself?  Not Assessed    Health Beliefs and Attitudes:   Patient Activation Measure Survey Score:  RADHA Score (Last Two) 9/15/2011 1/5/2012   RADHA Raw Score 30 35   Activation Score 37.3 45.2   RADHA Level 1 1       Barriers to Learning Assessment: Cognitive impairment - depression    Based on learning assessment above, most appropriate setting for further diabetes education would be: Individual setting.    INTERVENTION:   Education provided today on:  Taking Medication: Reviewed insulin doses. Pt stat seh takes insulin as follows:  Basaglar:  55 units daily  Novolog:   If pre-meal Bg < 135, takes 10 units  If 135-200: takes 12u  If > 200: takes 15u    Opportunities for ongoing education and support in diabetes-self management were discussed.    Pt verbalized understanding of concepts discussed and recommendations provided today.       Education Materials Provided:  [place] Understanding Diabetes Booklet, Safe Disposal Options for Needles & Syringes, BG Log Sheet and No new materials provided today    PLAN:  Taking Medication: Keep Basaglar dose same. New plan for Novolog  Novolog:  If pre meal bg < 130: take 10 units  If 130-170, take 12 units  If 170-200: take 14 units  If > 200, take 16 units  Insulin doses typed up for pt and put in bg meter bag.     FOLLOW-UP:  Keep f/u appt with Endo in March    Ongoing plan for education and support: As needed. Pt encouraged to call cde if questions or concerns arise.     Time Spent: 45 minutes  Encounter Type: Individual    Any diabetes medication dose changes were made via the CDE Protocol and Collaborative Practice Agreement with " the patient's endocrinology provider. A copy of this encounter was shared with the provider.    Holly Muir comes into clinic today at the request of Dr King, Ordering Provider for Pt Teaching and bg evaluation.     This service provided today was under the supervising provider of the day Dr Nixon, who was available if needed.    Mikayla Sellers RN, BSN, CDE   [facility]

## 2019-12-18 ENCOUNTER — TELEPHONE (OUTPATIENT)
Dept: PEDIATRICS | Facility: CLINIC | Age: 64
End: 2019-12-18

## 2019-12-18 DIAGNOSIS — F41.9 ANXIETY: ICD-10-CM

## 2019-12-18 NOTE — TELEPHONE ENCOUNTER
LOV- 12/10 says to f/u in 6 months. Looks like patient should have ran out in August. Called patient- she thinks she's been on it. Route to provider to refill.  Asya oHwell RN

## 2019-12-19 NOTE — TELEPHONE ENCOUNTER
Please check with home care nurse if her psychiatrist is prescribing this.  It should be coming from her psychiatrist.

## 2019-12-20 RX ORDER — BUSPIRONE HYDROCHLORIDE 30 MG/1
TABLET ORAL
Qty: 180 TABLET | Refills: 1 | OUTPATIENT
Start: 2019-12-20

## 2019-12-23 ENCOUNTER — THERAPY VISIT (OUTPATIENT)
Dept: PHYSICAL THERAPY | Facility: CLINIC | Age: 64
End: 2019-12-23
Attending: FAMILY MEDICINE
Payer: MEDICARE

## 2019-12-23 DIAGNOSIS — G89.29 CHRONIC BILATERAL LOW BACK PAIN WITHOUT SCIATICA: ICD-10-CM

## 2019-12-23 DIAGNOSIS — G89.29 CHRONIC RIGHT-SIDED LOW BACK PAIN WITH RIGHT-SIDED SCIATICA: ICD-10-CM

## 2019-12-23 DIAGNOSIS — M54.41 CHRONIC RIGHT-SIDED LOW BACK PAIN WITH RIGHT-SIDED SCIATICA: ICD-10-CM

## 2019-12-23 DIAGNOSIS — M54.50 CHRONIC BILATERAL LOW BACK PAIN WITHOUT SCIATICA: ICD-10-CM

## 2019-12-23 PROCEDURE — 97110 THERAPEUTIC EXERCISES: CPT | Mod: GP | Performed by: PHYSICAL THERAPIST

## 2019-12-23 PROCEDURE — 97161 PT EVAL LOW COMPLEX 20 MIN: CPT | Mod: GP | Performed by: PHYSICAL THERAPIST

## 2019-12-23 NOTE — PROGRESS NOTES
[facility] Initial Evaluation  Subjective:    Type of problem:  Lumbar   Occurance: over the year has been having pain, finding it hard to wash dishes as she is having to sit frequently. Plane ride to [place] once her back started to bug her and could not walk once they were there.  This is a chronic condition   Problem details: 4-5 months since symptoms have progressed but has had pain for years. Date of MD order 12/17/19.   Patient reports pain:  Lumbar spine left and lumbar spine right. Radiates to:  Gluteals left and gluteals right. Associated symptoms:  Loss of motion, tingling and numbness. Exacerbated by: washing dishes, prolonged walking, sitting in rocking chair or any soft chair, prolonged standing somestimes. Relieved by: sitting upright on a chair.     Holly TORREZ Muir being seen for low back pain.   Problem began 12/17/2019. Where condition occurred: for unknown reasons.Problem occurred: not sure just happened over time  and reported as 10/10 on pain scale. General health as reported by patient is good. Pertinent medical history includes:  Concussions/dizziness, diabetes, high blood pressure, history of fractures, numbness/tingling, rheumatoid arthritis, overweight, seizures and sleep disorder/apnea.   Other medical allergies details: morpine.  Surgeries include:  Cancer surgery. Other surgery history details: cervix.  Current medications:  Anti-depressants, anti-inflammatory, high blood pressure medication and sleep medication.   Primary job tasks include:  Prolonged standing and repetitive tasks.   and is intermittent. Pain is the same all the time. Since onset symptoms are gradually improving. Special tests:  X-ray (arthritis at L4-L5, and L5 and S1). Previous treatment includes physical therapy. There was mild improvement following previous treatment.   Patient is house work.   Barriers include:  None as reported by patient.  Red flags:  None as reported by patient.                       Objective:  System         Lumbar/SI Evaluation    Lumbar Myotomes:  Lumbar myotomes: knee extension is weak but more do to pain in left knee.  T12-L3 (Hip Flex):  Left: 5    Right: 5  L2-4 (Quads):  Left:  4    Right:  5  L4 (Ankle DF):  Left:  5    Right:  5  L5 (Great Toe Ext): Left: 5    Right: 5   S1 (Toe Raise):  Left: 5    Right: 5  Lumbar DTR's:    L4 (Quad):  Left:  2     S1 (Achilles):  Left:  2   Right:  2    Lumbar Dermtomes:  normal (does intemittently get njmbness / tingle in fee do t neruropathy)                Neural Tension/Mobility:      Left side:SLR or Slump  negative.     Right side:   Slump or SLR  negative.       Lumbar Provocation:      Left negative with:  PROM hip    Right negative with:  PROM hip                                                   [place] Lumbar Evaluation    Posture:  Sitting: fair  Standing: fair  Lordosis: Reduced  Lateral Shift: no  Correction of Posture: no effect    Movement Loss:  Flexion (Flex): pain  Extension (EXT): mod and pain  Side [place] R (SG R): min and pain  Side Glide L (SG L): nil  Test Movements:  FIS: During: produces  After: no worse  Pretest Movements: 0/10 pL  Repeat FIS: During: produces  After: no worse    EIS: During: produces  After: no worse    Repeat EIS: During: produces  After: no worse      EIL: During: produces  After: no worse    Repeat EIL: During: produces  After: no worse          Conclusion: other  Principle of Treatment:      Extension: repeated EIS x 10 reps every 2 hours if symptoms worsen begin flexion if no change after two day begin flexion. if better continue with repeated extension                                           ROS    Assessment/Plan:    Patient is a 64 year old female with lumbar complaints.    Patient has the following significant findings with corresponding treatment plan.                Diagnosis 1:  Chronic low back pain  Pain -  hot/cold therapy, US, manual therapy, self management, education, directional  preference exercise and home program  Decreased ROM/flexibility - manual therapy, therapeutic exercise, therapeutic activity and home program  Decreased joint mobility - manual therapy, therapeutic exercise, therapeutic activity and home program  Decreased function - therapeutic activities and home program  Impaired posture - neuro re-education, therapeutic activities and home program    Therapy Evaluation Codes:   1) History comprised of:   Personal factors that impact the plan of care:      Time since onset of symptoms.    Comorbidity factors that impact the plan of care are:      Diabetes, High blood pressure, Implanted device, Numbness/tingling, Overweight, Rheumatoid arthritis, Seizures and Sleep disorder/apnea.     Medications impacting care: High blood pressure.  2) Examination of Body Systems comprised of:   Body structures and functions that impact the plan of care:      Lumbar spine.   Activity limitations that impact the plan of care are:      Bending, Dressing, Standing and Walking.  3) Clinical presentation characteristics are:   Stable/Uncomplicated.  4) Decision-Making    Low complexity using standardized patient assessment instrument and/or measureable assessment of functional outcome.  Cumulative Therapy Evaluation is: Low complexity.    Previous and current functional limitations:  (See Goal Flow Sheet for this information)    Short term and Long term goals: (See Goal Flow Sheet for this information)     Communication ability:  Patient appears to be able to clearly communicate and understand verbal and written communication and follow directions correctly.  Treatment Explanation - The following has been discussed with the patient:   RX ordered/plan of care  Anticipated outcomes  Possible risks and side effects  This patient would benefit from PT intervention to resume normal activities.   Rehab potential is good.    Frequency:  1 X week, once daily  Duration:  for 8 weeks  Discharge Plan:  Achieve  all LTG.  Independent in home treatment program.  Reach maximal therapeutic benefit.    Please refer to the daily flowsheet for treatment today, total treatment time and time spent performing 1:1 timed codes.

## 2019-12-23 NOTE — LETTER
DEPARTMENT OF HEALTH AND HUMAN SERVICES  CENTERS FOR MEDICARE & MEDICAID SERVICES    PLAN/UPDATED PLAN OF PROGRESS FOR OUTPATIENT REHABILITATION    PATIENTS NAME:  Holly Muir     : 1955    PROVIDER NUMBER:    [id number]    HICN:  [id number]      PROVIDER NAME: [facility] PHYSICAL THERAPY    MEDICAL RECORD NUMBER: [id number]     START OF CARE DATE:  SOC Date: 19   TYPE:  PT    PRIMARY/TREATMENT DIAGNOSIS: (Pertinent Medical Diagnosis)  Chronic bilateral low back pain without sciatica  Chronic right-sided low back pain with right-sided sciatica    VISITS FROM START OF CARE:  Rxs Used: 1     [facility] Initial Evaluation  Subjective:  Type of problem:  Lumbar   Occurance: over the year has been having pain, finding it hard to wash dishes as she is having to sit frequently. Plane ride to [place] once her back started to bug her and could not walk once they were there.  This is a chronic condition   Problem details: 4-5 months since symptoms have progressed but has had pain for years. Date of MD order 19.   Patient reports pain:  Lumbar spine left and lumbar spine right. Radiates to:  Gluteals left and gluteals right. Associated symptoms:  Loss of motion, tingling and numbness. Exacerbated by: washing dishes, prolonged walking, sitting in rocking chair or any soft chair, prolonged standing somestimes. Relieved by: sitting upright on a chair.     Holly Muir being seen for low back pain.   Problem began 2019. Where condition occurred: for unknown reasons.Problem occurred: not sure just happened over time  and reported as 10/10 on pain scale. General health as reported by patient is good. Pertinent medical history includes:  Concussions/dizziness, diabetes, high blood pressure, history of fractures, numbness/tingling, rheumatoid arthritis, overweight, seizures and sleep disorder/apnea.   Other medical allergies details: morpine.  Surgeries include:   Cancer surgery. Other surgery history details: cervix.  Current medications:  Anti-depressants, anti-inflammatory, high blood pressure medication and sleep medication.   Primary job tasks include:  Prolonged standing and repetitive tasks.   and is intermittent. Pain is the same all the time. Since onset symptoms are gradually improving. Special tests:  X-ray (arthritis at L4-L5, and L5 and S1). Previous treatment includes physical therapy. There was mild improvement following previous treatment.   Patient is house work.   Barriers include:  None as reported by patient.  Red flags:  None as reported by patient.                 Objective:            PATIENTS NAME:  Holly Muir   : 1955      Lumbar/SI Evaluation  Lumbar Myotomes:  Lumbar myotomes: knee extension is weak but more do to pain in left knee.  T12-L3 (Hip Flex):  Left: 5    Right: 5  L2-4 (Quads):  Left:  4    Right:  5  L4 (Ankle DF):  Left:  5    Right:  5  L5 (Great Toe Ext): Left: 5    Right: 5   S1 (Toe Raise):  Left: 5    Right: 5  Lumbar DTR's:    L4 (Quad):  Left:  2     S1 (Achilles):  Left:  2   Right:  2    Lumbar Dermtomes:  normal (does intemittently get njmbness / tingle in fee do t neruropathy)    Neural Tension/Mobility:    Left side:SLR or Slump  negative.   Right side:   Slump or SLR  negative.     Lumbar Provocation:    Left negative with:  PROM hip  Right negative with:  PROM hip      Pastor Lumbar Evaluation  Posture:  Sitting: fair  Standing: fair  Lordosis: Reduced  Lateral Shift: no  Correction of Posture: no effect    Movement Loss:  Flexion (Flex): pain  Extension (EXT): mod and pain  Side [place] R (SG R): min and pain  Side Glide L (SG L): nil  Test Movements:  FIS: During: produces  After: no worse  Pretest Movements: 0/10 pL  Repeat FIS: During: produces  After: no worse    EIS: During: produces  After: no worse    Repeat EIS: During: produces  After: no worse      EIL: During: produces  After: no worse    Repeat EIL:  During: produces  After: no worse      Conclusion: other  Principle of Treatment:    Extension: repeated EIS x 10 reps every 2 hours if symptoms worsen begin flexion if no change after two day begin flexion. if better continue with repeated extension              PATIENTS NAME:  Holly Muir: 1955      Assessment/Plan:    Patient is a 64 year old female with lumbar complaints.    Patient has the following significant findings with corresponding treatment plan.                Diagnosis 1:  Chronic low back pain  Pain -  hot/cold therapy, US, manual therapy, self management, education, directional preference exercise and home program  Decreased ROM/flexibility - manual therapy, therapeutic exercise, therapeutic activity and home program  Decreased joint mobility - manual therapy, therapeutic exercise, therapeutic activity and home program  Decreased function - therapeutic activities and home program  Impaired posture - neuro re-education, therapeutic activities and home program    Therapy Evaluation Codes:   1) History comprised of:   Personal factors that impact the plan of care:      Time since onset of symptoms.    Comorbidity factors that impact the plan of care are:      Diabetes, High blood pressure, Implanted device, Numbness/tingling, Overweight, Rheumatoid arthritis, Seizures and Sleep disorder/apnea.     Medications impacting care: High blood pressure.  2) Examination of Body Systems comprised of:   Body structures and functions that impact the plan of care:      Lumbar spine.   Activity limitations that impact the plan of care are:      Bending, Dressing, Standing and Walking.  3) Clinical presentation characteristics are:   Stable/Uncomplicated.  4) Decision-Making    Low complexity using standardized patient assessment instrument and/or measureable assessment of functional outcome.  Cumulative Therapy Evaluation is: Low complexity.    Previous and current functional limitations:  (See Goal  "Flow Sheet for this information)    Short term and Long term goals: (See Goal Flow Sheet for this information)     Communication ability:  Patient appears to be able to clearly communicate and understand verbal and written communication and follow directions correctly.  Treatment Explanation - The following has been discussed with the patient:   RX ordered/plan of care  Anticipated outcomes  Possible risks and side effects  This patient would benefit from PT intervention to resume normal activities.   Rehab potential is good.    Frequency:  1 X week, once daily  Duration:  for 8 weeks  Discharge Plan:  Achieve all LTG.  Independent in home treatment program.  Reach maximal therapeutic benefit.              PATIENTS NAME:  Holly Muir   : 1955      Please refer to the daily flowsheet for treatment today, total treatment time and time spent performing 1:1 timed codes.         Caregiver Signature/Credentials _____________________________ Date ________       Treating Provider: Casey Sadler DPT   I have reviewed and certified the need for these services and plan of treatment while under my care.        PHYSICIAN'S SIGNATURE:   _____________________________________  Date___________       Santy Harrington DO    Certification period:  Beginning of Cert date period: 19 to End of Cert period date: 20     Functional Level Progress Report: Please see attached \"Goal Flow sheet for Functional level.\"    ____X____ Continue Services or       ________ DC Services                Service dates: From  SOC Date: 19 date to present                         "

## 2019-12-26 LAB — PHQ9 SCORE: 9

## 2019-12-26 NOTE — PROGRESS NOTES
HISTORY OF PRESENT ILLNESS    Holly Muir is a 64 year old female who is seen in consultation at the request of Santy Harrington DO  for evaluation of  left knee pain that has been present approximately 6 months.      No known injury.    Present symptoms:   pain anteriorly, swelling, +giving way.    With what activities:  walking, going up and down stairs, at night and extending knee.    How often?: daily    Treatments tried to this point: corticosteroid injection 10/19 which didn't help.  (right knee injection helped)    Orthopedic PMH: previous left patella open reduction and internal fixation many years    Other PMH:  has a past medical history of Cataracts, both eyes (5/8/2013), Cervical cancer (H), Diabetes, Diabetic retinopathy (H), HTN, Inflammatory arthritis, Major depression, Memory loss, Nephropathy, OA (osteoarthritis) of knee (9/11/2013), Other and unspecified hyperlipidemia, Pterygium eye, and Sacral nerve root injury.     Surgical:  has a past surgical history that includes hysterectomy, pap no longer indicated; salpingo oophorectomy,r/l/lalitha; breast lumpectomy, rt/lt; EXCIS PTERYGIUM (10/08); COLONOSCOPY THRU STOMA, DIAGNOSTIC (2002); STOMACH SURGERY PROCEDURE UNLISTED; Colonoscopy (5/10/2012); Implant stimulator sacral nerve stage one (Right, 3/10/2015); Implant stimulator sacral nerve stage two (Right, 3/31/2015); colostomy (2000); colostomy; Colonoscopy with CO2 insufflation (N/A, 8/2/2019); arthroscopy knee rt/lt; Blepharoplasty bilateral (Bilateral, 8/16/2019); Repair ptosis (Bilateral, 08/16/2019); cataract iol, rt/lt; Phacoemulsification with standard intraocular lens implant (Left, 9/26/2019); and Phacoemulsification with standard intraocular lens implant (Right, 10/24/2019).    Family Hx:  family history includes Cancer in her maternal grandfather and paternal grandfather; Cerebrovascular Disease in her mother; Diabetes in her brother, father, mother, and sister; Hypertension in her  father; Multiple Sclerosis in her daughter; Thyroid Disease in her daughter and sister.    Social Hx:  reports that she quit smoking about 3 years ago. She smoked 0.00 packs per day. She has never used smokeless tobacco. She reports current alcohol use. She reports that she does not use drugs.    REVIEW OF SYSTEMS:    CONSTITUTIONAL:  NEGATIVE for fever, chills, change in weight  INTEGUMENTARY/SKIN:  NEGATIVE for worrisome rashes, moles or lesions  EYES:  NEGATIVE for vision changes or irritation  ENT/MOUTH:  NEGATIVE for ear, mouth and throat problems  RESP:  NEGATIVE for significant cough or SOB  BREAST:  NEGATIVE for masses, tenderness or discharge  CV:  NEGATIVE for chest pain, palpitations or peripheral edema  GI:  NEGATIVE for nausea, abdominal pain, heartburn, or change in bowel habits  :  Negative   MUSCULOSKELETAL:  See HPI above  NEURO:  NEGATIVE for weakness, dizziness or paresthesias  ENDOCRINE:  NEGATIVE for temperature intolerance, skin/hair changes  HEME/ALLERGY/IMMUNE:  NEGATIVE for bleeding problems  PSYCHIATRIC:  NEGATIVE for changes in mood or affect    PHYSICAL EXAM:  /62 (BP Location: Right arm, Patient Position: Sitting, Cuff Size: Adult Regular)   Pulse 58   SpO2 96%    GENERAL APPEARANCE: healthy, alert, no distress and over weight   SKIN: no suspicious lesions or rashes  NEURO: Normal strength and tone, mentation intact and speech normal.  Slow responding and slow movements in general  VASCULAR:  good pulses, and cappillary refill   LYMPH: no lymphadenopathy   PSYCH:  mentation appears normal and affect normal/bright.    RESP: no increased work of breathing     KNEE EXAM:   Healed transverse scar .  Gait: slow gait  Alignment: normal   Squat: not tested     Patellofemoral joint: mild crepitations in the patellofemoral joint.  Effusion: moderate  ROM: 0*-125*  Tender: medial joint line and medial facet of the patella  Masses: none  Ligaments:  Lachman's Stable, Anterior and  posterior drawer stable, stable to varus and valgus stress.  no pain with Varus/Valgus stress testing.    McMurrays: pain but no catching with hyperflexion  Lateral retinaculum is not tight  Facet Tenderness: medial  Apprehension: negative  Painful resisted full extension.      X-RAY:  From 5/7/19 bilateral knee 4 views:    FINDINGS: AP, lateral, and axial views of the bilateral knees were  obtained. Osteophytic spurring in the patellofemoral joint  compartments of both knees, much greater on the left, with narrowing  along the left knee lateral patellofemoral joint compartment. No large  joint effusion in either knee joint. Mild narrowing in the medial and  lateral femorotibial joint compartment of both knees.    Osteoarthrosis in the bilateral knees, greatest in the  left knee lateral patellofemoral joint compartment.  Lucent line where presumably a distal pole of the patella fracture was repaired with sutures, possible chronic nonunion.       Impression:   Encounter Diagnoses   Name Primary?     Patellofemoral arthritis Yes     distal pole of patella fx nonunion--possible         Plan:  MRI ordered to evaluate if bone marrow edema at the distal pole of the patella.   Consider knee immobilizer usage x 6 weeks if evidence of nonunion or inflammation in that area  Addendum: she is unable to have an MRI because of an implant.  CT scan ordered.  Consider bone scan if CT is equivocal.    Return to clinic for results.    CONY Lord MD  Dept. Orthopedic Surgery  [facility]

## 2019-12-30 ENCOUNTER — TELEPHONE (OUTPATIENT)
Dept: PEDIATRICS | Facility: CLINIC | Age: 64
End: 2019-12-30

## 2019-12-30 DIAGNOSIS — M25.561 ARTHRALGIA OF BOTH KNEES: ICD-10-CM

## 2019-12-30 DIAGNOSIS — M25.562 ARTHRALGIA OF BOTH KNEES: ICD-10-CM

## 2019-12-30 DIAGNOSIS — M17.10 ARTHRITIS OF KNEE: ICD-10-CM

## 2019-12-30 NOTE — TELEPHONE ENCOUNTER
"[facility] Call Center    Phone Message    May a detailed message be left on voicemail: yes    Reason for Call: Medication Question or concern regarding medication   Prescription Clarification  Name of Medication: traZODone (DESYREL) 50 MG tablet [24310] (Order [id number])  Prescribing Provider: Dr. King   What on the order needs clarification? Pt's home care nurse called to notify care team that pt's psychiatrist increased dosage to 75 mg as of 12/26/2019. Pt has not picked this medication up yet, however.    Home care nurse also called to verify dosage of Pioglitazone - writer read verbatim \"Take 1 tablet (45 mg) by mouth daily - Oral\"    Please call home care nurse with any questions.    Action Taken: Message routed to:  Primary Care p [phone]  "

## 2019-12-30 NOTE — TELEPHONE ENCOUNTER
Tramadol  Last Written Prescription Date:  11/26/19  Last Fill Quantity: 45,  # refills: 0   Last office visit: 12/10/2019 with prescribing provider:  Fernando   Future Office Visit:   Next 5 appointments (look out 90 days)    Mar 27, 2020  9:25 AM CDT  Return Visit with Elizabeth Medrano MD, [facility] ENDO NURSE  [facility] ([facility] [address] [place] [id number] [phone]         Routing refill request to provider for review/approval because:  Drug not on the [facility] refill protocol

## 2019-12-31 DIAGNOSIS — Z53.9 DIAGNOSIS NOT YET DEFINED: Primary | ICD-10-CM

## 2019-12-31 PROCEDURE — G0179 MD RECERTIFICATION HHA PT: HCPCS | Performed by: INTERNAL MEDICINE

## 2020-01-07 ENCOUNTER — OFFICE VISIT (OUTPATIENT)
Dept: ORTHOPEDICS | Facility: CLINIC | Age: 65
End: 2020-01-07
Payer: MEDICARE

## 2020-01-07 ENCOUNTER — THERAPY VISIT (OUTPATIENT)
Dept: PHYSICAL THERAPY | Facility: CLINIC | Age: 65
End: 2020-01-07
Payer: MEDICARE

## 2020-01-07 VITALS — OXYGEN SATURATION: 96 % | DIASTOLIC BLOOD PRESSURE: 62 MMHG | HEART RATE: 58 BPM | SYSTOLIC BLOOD PRESSURE: 104 MMHG

## 2020-01-07 DIAGNOSIS — M54.41 CHRONIC RIGHT-SIDED LOW BACK PAIN WITH RIGHT-SIDED SCIATICA: ICD-10-CM

## 2020-01-07 DIAGNOSIS — S82.092K: ICD-10-CM

## 2020-01-07 DIAGNOSIS — G89.29 CHRONIC RIGHT-SIDED LOW BACK PAIN WITH RIGHT-SIDED SCIATICA: ICD-10-CM

## 2020-01-07 DIAGNOSIS — M17.10 PATELLOFEMORAL ARTHRITIS: Primary | ICD-10-CM

## 2020-01-07 PROCEDURE — 97110 THERAPEUTIC EXERCISES: CPT | Mod: GP | Performed by: PHYSICAL THERAPIST

## 2020-01-07 PROCEDURE — 99214 OFFICE O/P EST MOD 30 MIN: CPT | Performed by: ORTHOPAEDIC SURGERY

## 2020-01-07 PROCEDURE — 97140 MANUAL THERAPY 1/> REGIONS: CPT | Mod: GP | Performed by: PHYSICAL THERAPIST

## 2020-01-07 RX ORDER — TRAMADOL HYDROCHLORIDE 50 MG/1
TABLET ORAL
Qty: 45 TABLET | Refills: 0 | Status: SHIPPED | OUTPATIENT
Start: 2020-01-07 | End: 2020-02-05

## 2020-01-07 RX ORDER — TRAZODONE HYDROCHLORIDE 50 MG/1
75 TABLET, FILM COATED ORAL AT BEDTIME
COMMUNITY
Start: 2019-12-30 | End: 2023-11-21

## 2020-01-07 ASSESSMENT — PAIN SCALES - GENERAL: PAINLEVEL: MODERATE PAIN (5)

## 2020-01-07 NOTE — LETTER
1/7/2020         RE: Holly Muir  [id number] [facility] [id number]        Dear Colleague,    Thank you for referring your patient, Holly Muir, to the [facility]. Please see a copy of my visit note below.    HISTORY OF PRESENT ILLNESS    Holly Muir is a 64 year old female who is seen in consultation at the request of Santy Harrington DO  for evaluation of  left knee pain that has been present approximately 6 months.      No known injury.    Present symptoms:   pain anteriorly, swelling, +giving way.    With what activities:  walking, going up and down stairs, at night and extending knee.    How often?: daily    Treatments tried to this point: corticosteroid injection 10/19 which didn't help.  (right knee injection helped)    Orthopedic PMH: previous left patella open reduction and internal fixation many years    Other PMH:  has a past medical history of Cataracts, both eyes (5/8/2013), Cervical cancer (H), Diabetes, Diabetic retinopathy (H), HTN, Inflammatory arthritis, Major depression, Memory loss, Nephropathy, OA (osteoarthritis) of knee (9/11/2013), Other and unspecified hyperlipidemia, Pterygium eye, and Sacral nerve root injury.     Surgical:  has a past surgical history that includes hysterectomy, pap no longer indicated; salpingo oophorectomy,r/l/lalitha; breast lumpectomy, rt/lt; EXCIS PTERYGIUM (10/08); COLONOSCOPY THRU STOMA, DIAGNOSTIC (2002); STOMACH SURGERY PROCEDURE UNLISTED; Colonoscopy (5/10/2012); Implant stimulator sacral nerve stage one (Right, 3/10/2015); Implant stimulator sacral nerve stage two (Right, 3/31/2015); colostomy (2000); colostomy; Colonoscopy with CO2 insufflation (N/A, 8/2/2019); arthroscopy knee rt/lt; Blepharoplasty bilateral (Bilateral, 8/16/2019); Repair ptosis (Bilateral, 08/16/2019); cataract iol, rt/lt; Phacoemulsification with standard intraocular lens implant (Left, 9/26/2019); and Phacoemulsification with standard  intraocular lens implant (Right, 10/24/2019).    Family Hx:  family history includes Cancer in her maternal grandfather and paternal grandfather; Cerebrovascular Disease in her mother; Diabetes in her brother, father, mother, and sister; Hypertension in her father; Multiple Sclerosis in her daughter; Thyroid Disease in her daughter and sister.    Social Hx:  reports that she quit smoking about 3 years ago. She smoked 0.00 packs per day. She has never used smokeless tobacco. She reports current alcohol use. She reports that she does not use drugs.    REVIEW OF SYSTEMS:    CONSTITUTIONAL:  NEGATIVE for fever, chills, change in weight  INTEGUMENTARY/SKIN:  NEGATIVE for worrisome rashes, moles or lesions  EYES:  NEGATIVE for vision changes or irritation  ENT/MOUTH:  NEGATIVE for ear, mouth and throat problems  RESP:  NEGATIVE for significant cough or SOB  BREAST:  NEGATIVE for masses, tenderness or discharge  CV:  NEGATIVE for chest pain, palpitations or peripheral edema  GI:  NEGATIVE for nausea, abdominal pain, heartburn, or change in bowel habits  :  Negative   MUSCULOSKELETAL:  See HPI above  NEURO:  NEGATIVE for weakness, dizziness or paresthesias  ENDOCRINE:  NEGATIVE for temperature intolerance, skin/hair changes  HEME/ALLERGY/IMMUNE:  NEGATIVE for bleeding problems  PSYCHIATRIC:  NEGATIVE for changes in mood or affect    PHYSICAL EXAM:  /62 (BP Location: Right arm, Patient Position: Sitting, Cuff Size: Adult Regular)   Pulse 58   SpO2 96%    GENERAL APPEARANCE: healthy, alert, no distress and over weight   SKIN: no suspicious lesions or rashes  NEURO: Normal strength and tone, mentation intact and speech normal.  Slow responding and slow movements in general  VASCULAR:  good pulses, and cappillary refill   LYMPH: no lymphadenopathy   PSYCH:  mentation appears normal and affect normal/bright.    RESP: no increased work of breathing     KNEE EXAM:   Healed transverse scar .  Gait: slow gait  Alignment:  normal   Squat: not tested     Patellofemoral joint: mild crepitations in the patellofemoral joint.  Effusion: moderate  ROM: 0*-125*  Tender: medial joint line and medial facet of the patella  Masses: none  Ligaments:  Lachman's Stable, Anterior and posterior drawer stable, stable to varus and valgus stress.  no pain with Varus/Valgus stress testing.    McMurrays: pain but no catching with hyperflexion  Lateral retinaculum is not tight  Facet Tenderness: medial  Apprehension: negative  Painful resisted full extension.      X-RAY:  From 5/7/19 bilateral knee 4 views:    FINDINGS: AP, lateral, and axial views of the bilateral knees were  obtained. Osteophytic spurring in the patellofemoral joint  compartments of both knees, much greater on the left, with narrowing  along the left knee lateral patellofemoral joint compartment. No large  joint effusion in either knee joint. Mild narrowing in the medial and  lateral femorotibial joint compartment of both knees.    Osteoarthrosis in the bilateral knees, greatest in the  left knee lateral patellofemoral joint compartment.  Lucent line where presumably a distal pole of the patella fracture was repaired with sutures, possible chronic nonunion.       Impression:   Encounter Diagnoses   Name Primary?     Patellofemoral arthritis Yes     distal pole of patella fx nonunion--possible         Plan:  MRI ordered to evaluate if bone marrow edema at the distal pole of the patella.   Consider knee immobilizer usage x 6 weeks if evidence of nonunion or inflammation in that area  Addendum: she is unable to have an MRI because of an implant.  CT scan ordered.  Consider bone scan if CT is equivocal.    Return to clinic for results.    CONY Lord MD  Dept. Orthopedic Surgery  [facility]     Again, thank you for allowing me to participate in the care of your patient.        Sincerely,        Myke Lord MD

## 2020-01-09 NOTE — PROGRESS NOTES
"CC: wants Interstim device removed    HPI: It is a pleasure to see Ms. Holly Muir, a very pleasant 64 year old female seen today in the urology clinic in consultation from Dr. King for consideration of Interstim device removal. From chart review, she had a stage 1 procedure on 3/10/15 (Dr. Yusuf) which resulted in \"100% improvement with no further incontinence.\" She therefore underwent stage 2 implant on 3/31/15 and was recommended to follow up with urology as needed.    Today, she reports that the Interstim did not work at all. She tells me that it was put in to help with her fecal retention so that her colostomy could be reversed. However, this never happened. When asked about her urinary incontinence pre and post-Interstim implant, she states that she never had issues with urinary incontinence. However, review of Dr. King's note from 12/10/19 mentions that she is in diapers.     The patient thinks that the Interstim device is causing back pain as it started after the stage 2 implant was placed. She would like the device removed since it is not effective and causing pain. She states that the device is turned on, but she feels no stimulation in the vaginal area or elsewhere. She cannot turn the device off because she lost the remote a few months ago. [employer] is supposedly sending her a new remote.      Of note, she was also referred to see ortho/sports medicine to evaluate for other possible causes of her back pain. She did have an xray in 2017 showing osteoarthritic change and disc space narrowing at L4-L5 and L5-S1. She was referred to physical therapy for her back and MRI was also recommended to see if an injection may help her (though I do not see that an MRI was ever ordered).    Past Medical History:   Diagnosis Date     Bilateral knee pain      Cataracts, both eyes 5/8/2013     Cervical cancer (H)      Diabetes      Diabetic retinopathy (H)      HTN      Inflammatory arthritis      Major depression      " Memory loss      Nephropathy      OA (osteoarthritis) of knee 9/11/2013     Other and unspecified hyperlipidemia      Pterygium eye      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     Past Surgical History:   Procedure Laterality Date     ARTHROSCOPY KNEE RT/LT      LT     BLEPHAROPLASTY BILATERAL Bilateral 8/16/2019    Procedure: BILATERAL UPPER LID BLEPHAROPLASTY;  Surgeon: Randolph Cook MD;  Location: [facility] OR     BREAST LUMPECTOMY, RT/LT      LT-benign      C EXCIS PTERYGIUM  10/08    [facility]     C STOMACH SURGERY PROCEDURE UNLISTED       CATARACT IOL, RT/LT       COLONOSCOPY  5/10/2012    Procedure:COLONOSCOPY; screening colonoscopy; Surgeon:MILLA VEGA; Location:[facility]     COLONOSCOPY WITH CO2 INSUFFLATION N/A 8/2/2019    Procedure: Colonoscopy with CO2 insufflation;  Surgeon: Himanshu Hi MD;  Location: [facility] OR     COLOSTOMY  2000     COLOSTOMY       [facility] COLONOSCOPY THRU STOMA, DIAGNOSTIC  2002    normal per report-no records available-records destroyed     HYSTERECTOMY, PAP NO LONGER INDICATED      done in [place]-cervical cancer     IMPLANT STIMULATOR SACRAL NERVE STAGE ONE Right 3/10/2015    Procedure: IMPLANT STIMULATOR SACRAL NERVE STAGE ONE;  Surgeon: Teodora Yusuf MD;  [facility]     IMPLANT STIMULATOR SACRAL NERVE STAGE TWO Right 3/31/2015    Procedure: IMPLANT STIMULATOR SACRAL NERVE STAGE TWO;  Surgeon: Teodora Yusuf MD;  [facility]     PHACOEMULSIFICATION WITH STANDARD INTRAOCULAR LENS IMPLANT Left 9/26/2019    Procedure: LEFT PHACOEMULSIFICATION, CATARACT, WITH STANDARD IOL INSERTION;  Surgeon: Francesco Winn MD;  Location: [facility]     PHACOEMULSIFICATION WITH STANDARD INTRAOCULAR LENS IMPLANT Right 10/24/2019    Procedure: RIGHT PHACOEMULSIFICATION, CATARACT, WITH STANDARD IOL INSERTION;  Surgeon: Francesco Winn MD;  Location: [facility]     REPAIR PTOSIS Bilateral 08/16/2019     SALPINGO OOPHORECTOMY,R/L/JENNIFER      Salpingo Oophorectomy,  RT/LT/JENNIFER     Current Outpatient Medications   Medication Sig Dispense Refill     albuterol (PROAIR HFA/PROVENTIL HFA/VENTOLIN HFA) 108 (90 Base) MCG/ACT inhaler Inhale 2 puffs into the lungs every 6 hours (Patient not taking: Reported on 12/10/2019) 1 Inhaler 0     ARIPiprazole (ABILIFY) 30 MG tablet Take 1 tablet (30 mg) by mouth daily       ARIPiprazole (ABILIFY) 5 MG tablet Take 1 tablet (5 mg) by mouth At Bedtime Along with 5mg tab to total 25mg.  Prescribed by Dr. Isael Jiménez at [facility] 30 tablet 1     aspirin ([facility] ASPIRIN ADULT LOW DOSE) 81 MG chewable tablet TAKE 1 TABLET (81 MG) BY MOUTH DAILY 90 tablet 3     blood glucose (ACCU-CHEK CELIA) test strip Use to test blood sugars 3 times daily or as directed. 300 each 3     blood glucose monitoring (ACCU-CHEK CELIA PLUS) meter device kit Use to test blood sugars 4 times daily or as directed. 1 kit 0     blood glucose monitoring (ACCU-CHEK FASTCLIX) lancets Use to test blood sugar 6 times daily or as directed. 510 each 1     buPROPion (WELLBUTRIN SR) 150 MG 12 hr tablet Take 1 tablet (150 mg) by mouth 2 times daily       buPROPion (WELLBUTRIN XL) 300 MG 24 hr tablet TAKE 1 TABLET EVERY DAY. (TOTAL DAILY DOSE = 300MG)  1     busPIRone HCl (BUSPAR) 30 MG tablet TAKE 1 TABLET BY MOUTH TWICE A DAY 60 tablet 0     [facility] PAIN RELIEF 500 MG tablet TAKE 2 TABLETS BY MOUTH EVERY 8 HOURS. MAX ACETAMINOPHEN DOSE: 4000MG IN 24 HRS.  99     gabapentin (NEURONTIN) 600 MG tablet Take 1 tablet (600 mg) by mouth 3 times daily 90 tablet 11     ibuprofen (ADVIL/MOTRIN) 400 MG tablet Take 1 tablet (400 mg) by mouth every 8 hours as needed for moderate pain (with food)       insulin aspart (NOVOLOG FLEXPEN) 100 UNIT/ML pen 15 unit subcutaneous per meal three times a day. 30 mL 5     insulin glargine (BASAGLAR KWIKPEN) 100 UNIT/ML pen Take 55 units daily. 60 mL 5     insulin pen needle 31G X 5 MM Use four times 4 day times a day 400 each 1     metFORMIN (GLUCOPHAGE) 1000  MG tablet Take 1 tablet (1,000 mg) by mouth 2 times daily (with meals) 180 tablet 3     Multiple Vitamin (DAILY-ISAAC) TABS TAKE 1 TABLET BY MOUTH DAILY 30 tablet 11     nystatin (MYCOSTATIN) 597190 UNIT/GM external powder Apply 1 dose topically 3 times daily as needed 60 g 3     omeprazole (PRILOSEC) 20 MG DR capsule TAKE 2 CAPSULES BY MOUTH DAILY TAKE 30-60 MINUTES BEFORE A MEAL. 180 capsule 3     order for DME Equipment being ordered: grab bar for bath tub/shower. (Patient not taking: Reported on 12/10/2019) 1 Units 0     order for DME Equipment being ordered: railing for stairs at home. (Patient not taking: Reported on 12/10/2019) 1 Units 0     order for DME Equipment being ordered: Knee walker  1 Device 0     order for DME by Device route continuous Air walker -  Use as instructed 1 Device 0     ORDER FOR DME Equipment being ordered:  Ostomy supplies.  Drain Pouch by [employer] #.    Also needs Barrier by [employer] #. 3 Month 4     ORDER FOR DME Equipment being ordered: Cane 1 Units 0     PARoxetine (PAXIL) 20 MG tablet Take 3 tablets (60 mg) by mouth every morning       PARoxetine (PAXIL) 20 MG tablet TAKE 2 TABLETS AT BEDTIME (Prescribed by Dr[facility]ael [facility] at [facility]) 30 tablet      pioglitazone (ACTOS) 45 MG tablet Take 1 tablet (45 mg) by mouth daily 90 tablet 3     propranolol ER (INDERAL LA) 60 MG 24 hr capsule Take 1 capsule (60 mg) by mouth daily 30 capsule 11     ramipril (ALTACE) 5 MG capsule Take 1 capsule (5 mg) by mouth daily 30 capsule 10     salicylic acid (MEDIPLAST) 40 % miscellaneous Apply to warts nightly as tolerated. 25 each 11     simvastatin (ZOCOR) 20 MG tablet Take 1 tablet (20 mg) by mouth At Bedtime 30 tablet 11     traMADol (ULTRAM) 50 MG tablet TAKE 1/2 TABLET BY MOUTH 3 TIMES A DAY 45 tablet 0     traZODone (DESYREL) 50 MG tablet Take 1.5 tablets (75 mg) by mouth At Bedtime       UNIVERSAL REMOVER WIPES EX MISC uses to remove barrier from colostomy bag  "at time of change 1 Each 3     vitamin D3 (CHOLECALCIFEROL) 2000 units (50 mcg) tablet TAKE 1 TABLET BY MOUTH DAILY 100 tablet 3     Allergies   Allergen Reactions     Morphine Sulfate Itching       FAMILY HISTORY: The patient denies any history of genitourinary carcinoma and denies history or urolithiasis.    SOCIAL HISTORY: The patient does not smoke cigarettes, minimal EtOH, and no illicit drug use.      ROS: A comprehensive 14 point ROS was obtained and is positive for back pain.  The remainder of the ROS is negative except for that outlined above in the HPI.    PHYSICAL EXAM:   Vitals:    01/10/20 1315   BP: 109/68   BP Location: Left arm   Patient Position: Sitting   Cuff Size: Adult Regular   Pulse: 80   SpO2: 99%   Weight: 98.9 kg (218 lb)   Height: 1.575 m (5' 2\")     GENERAL: Well groomed/well developed/well nourished female in NAD.  HEENT: EOMI, AT, NC.  SKIN: Warm to touch, dry.  No visible rashes or lesions.  RESP: No increased respiratory effort.   MS: Full ROM in extremities.  BACK: Interstim device is palpable under the skin of the right upper buttocks. Mild tenderness to palpation when pressing firmly over the device. No erythema, warmth, fluctuance, or overlying skin changes. There is a horizontal scar that is well healed inferior to the device.  NEURO: Alert and oriented x 3.  PSYCH: Normal mood and affect, pleasant and agreeable during interview and exam.    ASSESSMENT and PLAN:    Ms. Holly Muir is a 64 year old female s/p stage 2 Interstim implant in 2015. History obtained from patient is contradictory to review of chart notes as to original indication for the device implant and outcome (see HPI for details). To summarize, she states that the Interstim never worked and is now causing pain and she would like it out. We discussed that removing the Interstim may not lead to resolution of her back pain as she also has osteoarthritic changes and disc space narrowing in her lumbar/sacral spine " (for which she sees ortho/sports med). In addition, she has not tried turning the device off due to losing her remote a few months ago. We discussed and will plan for the following:  -Obtain new remote from [employer] and try turning device off to see if this results in any improvement of her back pain (suspect that it won't make a difference).  -Obtain sacral x-ray 2 view to ensure the device and lead are in correct position.  -Follow up with Dr. Yusuf if the above measures do not help and she would still like to consider device explant.     I have enjoyed participating in the medical care of this very pleasant patient.  Please don't hesitate to contact me with any questions or concerns.     Daniella Edward PA-C  Department of Urology

## 2020-01-10 ENCOUNTER — ANCILLARY PROCEDURE (OUTPATIENT)
Dept: CT IMAGING | Facility: CLINIC | Age: 65
End: 2020-01-10
Attending: ORTHOPAEDIC SURGERY
Payer: MEDICARE

## 2020-01-10 ENCOUNTER — OFFICE VISIT (OUTPATIENT)
Dept: UROLOGY | Facility: CLINIC | Age: 65
End: 2020-01-10
Attending: INTERNAL MEDICINE
Payer: MEDICARE

## 2020-01-10 ENCOUNTER — ANCILLARY PROCEDURE (OUTPATIENT)
Dept: GENERAL RADIOLOGY | Facility: CLINIC | Age: 65
End: 2020-01-10
Attending: PHYSICIAN ASSISTANT
Payer: MEDICARE

## 2020-01-10 VITALS
WEIGHT: 218 LBS | SYSTOLIC BLOOD PRESSURE: 109 MMHG | HEIGHT: 62 IN | OXYGEN SATURATION: 99 % | BODY MASS INDEX: 40.12 KG/M2 | DIASTOLIC BLOOD PRESSURE: 68 MMHG | HEART RATE: 80 BPM

## 2020-01-10 DIAGNOSIS — R33.9 URINARY RETENTION: ICD-10-CM

## 2020-01-10 DIAGNOSIS — S82.092K: ICD-10-CM

## 2020-01-10 DIAGNOSIS — M53.3 SACRAL BACK PAIN: ICD-10-CM

## 2020-01-10 DIAGNOSIS — M53.3 SACRAL BACK PAIN: Primary | ICD-10-CM

## 2020-01-10 PROCEDURE — 73700 CT LOWER EXTREMITY W/O DYE: CPT | Mod: LT | Performed by: RADIOLOGY

## 2020-01-10 PROCEDURE — 72220 X-RAY EXAM SACRUM TAILBONE: CPT | Performed by: RADIOLOGY

## 2020-01-10 PROCEDURE — 99213 OFFICE O/P EST LOW 20 MIN: CPT | Performed by: PHYSICIAN ASSISTANT

## 2020-01-10 ASSESSMENT — MIFFLIN-ST. JEOR: SCORE: 1497.09

## 2020-01-10 NOTE — NURSING NOTE
"Holly Muir's goals for this visit include:   Chief Complaint   Patient presents with     New Patient     Urinary retention       She requests these members of her care team be copied on today's visit information:     PCP: Tiffany King    Referring Provider:  Tiffany King MD PhD  [address] [place] [address]    /68 (BP Location: Left arm, Patient Position: Sitting, Cuff Size: Adult Regular)   Pulse 80   Ht 1.575 m (5' 2\")   Wt 98.9 kg (218 lb)   SpO2 99%   BMI 39.87 kg/m      Do you need any medication refills at today's visit? No    Daniella Mi LPN    "

## 2020-01-13 ENCOUNTER — OFFICE VISIT (OUTPATIENT)
Dept: UROLOGY | Facility: CLINIC | Age: 65
End: 2020-01-13
Payer: MEDICARE

## 2020-01-13 DIAGNOSIS — R39.15 URINARY URGENCY: ICD-10-CM

## 2020-01-13 DIAGNOSIS — R33.9 URINARY RETENTION: Primary | ICD-10-CM

## 2020-01-13 PROCEDURE — 99214 OFFICE O/P EST MOD 30 MIN: CPT | Performed by: UROLOGY

## 2020-01-13 NOTE — NURSING NOTE
Holly Muir's goals for this visit include:   Chief Complaint   Patient presents with     RECHECK     Interstim removal consult       She requests these members of her care team be copied on today's visit information:     PCP: Tiffany King    Referring Provider:  No referring provider defined for this encounter.    There were no vitals taken for this visit.    Do you need any medication refills at today's visit? No    Daniella Mi LPN

## 2020-01-13 NOTE — PROGRESS NOTES
Reason for Visit:  Removal of interstim device.    Clinical Data: Ms. Muir is a 64 year old female with hx of urgency and urinary retention who voids by crede is wondering about interstim to possibly see if it brianna help her void better. The patient then underwent the stage I procedure on 3/10/15 and was very happy. She states that she has had 100% improvement in her symptoms. After final implant she last reported no incontinence in 2015. She feels that she is voiding without any difficulty.     Now, the patient thinks that the Interstim device is causing back pain as it started after the stage 2 implant was placed. She would like the device removed since it is not effective and causing pain. She states that the device is turned on, but she feels no stimulation in the vaginal area or elsewhere. She cannot turn the device off because she lost the remote a few months ago. [email or web address] is supposedly sending her a new remote.  She denies any urinary issues.     Of note, she was also referred to see ortho/sports medicine to evaluate for other possible causes of her back pain. She did have an xray in 2017 showing osteoarthritic change and disc space narrowing at L4-L5 and L5-S1. She was referred to physical therapy for her back and MRI was also recommended to see if an injection may help her (though I do not see that an MRI was ever ordered).    There were no vitals taken for this visit.  NAD  A/O X 3  EOMI  No increased resp effort  Skin warm to touch  Moving all extremities    Reviewed PMhx, SHx, medications, and Allergies.    A/P: 63 y/o female doing great from a urinary standpoint with interstim implant.  However she is having back pain that is very significant and she cannot find her controller.  She is in the process of getting a new controller to see if she can turn it off and whether this will actually help with her pain.  I reminded her of her urinary issues prior to the interstim and she agrees that is would be  better to be sure that this is actually contributing to her pain rather than just pull it out.    -will help pt. Find a controller  -if not helped by turning off the device unlikely to benefit from removal  -if helped then will remove, otherwise it is very much helping her urinary symptoms.  -f/u in a month    Thank you for allowing me to participate in the care of Ms. Holly Muir and I will keep you updated on her progress.    Teodora Yusuf MD    25 min spent with patient,  >50% in discussion and coordination of care.

## 2020-01-14 ENCOUNTER — THERAPY VISIT (OUTPATIENT)
Dept: PHYSICAL THERAPY | Facility: CLINIC | Age: 65
End: 2020-01-14
Payer: MEDICARE

## 2020-01-14 DIAGNOSIS — F32.3 SEVERE MAJOR DEPRESSION WITH PSYCHOTIC FEATURES (H): ICD-10-CM

## 2020-01-14 DIAGNOSIS — G89.29 CHRONIC RIGHT-SIDED LOW BACK PAIN WITH RIGHT-SIDED SCIATICA: ICD-10-CM

## 2020-01-14 DIAGNOSIS — M54.41 CHRONIC RIGHT-SIDED LOW BACK PAIN WITH RIGHT-SIDED SCIATICA: ICD-10-CM

## 2020-01-14 PROCEDURE — 97110 THERAPEUTIC EXERCISES: CPT | Mod: GP | Performed by: PHYSICAL THERAPIST

## 2020-01-14 PROCEDURE — 97140 MANUAL THERAPY 1/> REGIONS: CPT | Mod: GP | Performed by: PHYSICAL THERAPIST

## 2020-01-14 RX ORDER — MULTIVITAMIN WITH FOLIC ACID 400 MCG
TABLET ORAL
Qty: 90 TABLET | Refills: 3 | Status: SHIPPED | OUTPATIENT
Start: 2020-01-14 | End: 2020-12-05

## 2020-01-14 NOTE — TELEPHONE ENCOUNTER
Daily-Vit    Prescription approved per [facility] Refill Protocol.      Last Written Prescription Date:  1/11/19  Last Fill Quantity: 30,  # refills: 11     Last office visit: 12/10/2019 with prescribing provider:  Dr. King   Future Office Visit:   Next 5 appointments (look out 90 days)    Mar 27, 2020  9:25 AM CDT  Return Visit with Elizabeth Medrano MD, [facility] ENDO NURSE  [facility] [address] [place] [id number]  [phone]         Liz Boogie RN, [facility]

## 2020-01-27 ENCOUNTER — THERAPY VISIT (OUTPATIENT)
Dept: PHYSICAL THERAPY | Facility: CLINIC | Age: 65
End: 2020-01-27
Payer: MEDICARE

## 2020-01-27 DIAGNOSIS — M54.41 CHRONIC RIGHT-SIDED LOW BACK PAIN WITH RIGHT-SIDED SCIATICA: ICD-10-CM

## 2020-01-27 DIAGNOSIS — G89.29 CHRONIC RIGHT-SIDED LOW BACK PAIN WITH RIGHT-SIDED SCIATICA: ICD-10-CM

## 2020-01-27 PROCEDURE — 97140 MANUAL THERAPY 1/> REGIONS: CPT | Mod: GP | Performed by: PHYSICAL THERAPIST

## 2020-01-27 PROCEDURE — 97110 THERAPEUTIC EXERCISES: CPT | Mod: GP | Performed by: PHYSICAL THERAPIST

## 2020-01-31 RX ORDER — LORATADINE 10 MG
TABLET ORAL
Qty: 90 TABLET | Refills: 3 | Status: SHIPPED | OUTPATIENT
Start: 2020-01-31 | End: 2020-12-27

## 2020-02-04 ENCOUNTER — TELEPHONE (OUTPATIENT)
Dept: UROLOGY | Facility: CLINIC | Age: 65
End: 2020-02-04

## 2020-02-04 NOTE — TELEPHONE ENCOUNTER
Received return call from patient who reports that she spoke with [employer] patient services. Per patient, [employer] needs a prescription for the  from Dr. Yusuf. Informed patient that writer will work on this and contact her with additional information. Patient was comfortable with plan.     Called and spoke to [employer] team (telephone # [phone]) who reports that the prescription form will be sent to the clinic for Dr. Yusuf to sign. Writer requested for prescription form to be faxed to [phone]. Once received, will have Dr. Yusuf review and sign when she returns to clinic on 2/10/20.    Alejandra Forde RN, BSN

## 2020-02-04 NOTE — TELEPHONE ENCOUNTER
Patient returned call and reports that she completed and sent in the paperwork but has not received a replacement  as of yet. Informed patient that it is recommended for her to contact [employer] Patient Services to discuss the replacement . Patient was not able to write the patient services number down but was transferred to [employer] patient services at [phone]. Patient aware that writer will follow up with her in a few days to discuss further.    Alejandra Forde RN, BSN

## 2020-02-04 NOTE — TELEPHONE ENCOUNTER
Attempted to reach patient by phone, but no answer. Left generic message with a request to return call to clinic. When patient returns call, will discuss if she has received the replacement  for her interstim device. If patient has not yet received , will have patient contact [employer] patient services at [phone] per recommendation from [employer] Representative.    Alejandra Forde RN, BSN

## 2020-02-04 NOTE — TELEPHONE ENCOUNTER
Returned call to patient, but no answer. Left generic message with request to return call to clinic.    Alejandra Forde RN, BSN

## 2020-02-05 ENCOUNTER — TELEPHONE (OUTPATIENT)
Dept: PEDIATRICS | Facility: CLINIC | Age: 65
End: 2020-02-05

## 2020-02-05 DIAGNOSIS — M17.10 ARTHRITIS OF KNEE: ICD-10-CM

## 2020-02-05 DIAGNOSIS — M25.562 ARTHRALGIA OF BOTH KNEES: ICD-10-CM

## 2020-02-05 DIAGNOSIS — M25.561 ARTHRALGIA OF BOTH KNEES: ICD-10-CM

## 2020-02-05 RX ORDER — TRAMADOL HYDROCHLORIDE 50 MG/1
TABLET ORAL
Qty: 45 TABLET | Refills: 0 | Status: SHIPPED | OUTPATIENT
Start: 2020-02-05 | End: 2020-03-13

## 2020-02-05 NOTE — TELEPHONE ENCOUNTER
[facility] Call Center    Phone Message    May a detailed message be left on voicemail: yes     Reason for Call: Medication Refill Request    Has the patient contacted the pharmacy for the refill? Yes   Name of medication being requested: traMADol (ULTRAM) 50 MG tablet [[id number]] (Order [id number])     Provider who prescribed the medication: Fernando  Pharmacy: [id number] - [place], [place] - [address]  Date medication is needed: Home care nurse states she is good until Sunday    Please advise         Action Taken: Message routed to:  Primary Care p [id number]

## 2020-02-05 NOTE — TELEPHONE ENCOUNTER
Form received from [facility]. Will plan for Dr. Yusuf to review and sign when she returns to clinic on Monday.    Alejandra Forde RN, BSN

## 2020-02-05 NOTE — TELEPHONE ENCOUNTER
traMADol (ULTRAM) 50 MG tablet  Last Written Prescription Date:  1/7/2020  Last Fill Quantity: 45,  # refills: 0   Last office visit: 12/10/2019 with prescribing provider:     Future Office Visit:   Next 5 appointments (look out 90 days)    Mar 02, 2020  2:15 PM CST  [facility] [address] [place] [id number]  [phone]   Mar 27, 2020  9:25 AM CDT  Return Visit with Elizabeth Medrano MD, [facility] ENDO NURSE  [facility] [address] [place] [phone]         Routing refill request to provider for review/approval because:  Drug not on the [facility] refill protocol     Amairani Hernandez, RN, BSN, PHN  [facility]

## 2020-02-11 ENCOUNTER — DOCUMENTATION ONLY (OUTPATIENT)
Dept: PEDIATRICS | Facility: CLINIC | Age: 65
End: 2020-02-11

## 2020-02-11 NOTE — TELEPHONE ENCOUNTER
Signed prescription form successfully faxed to [facility] at fax#[phone].    Alejandra Forde RN, BSN

## 2020-02-11 NOTE — PROGRESS NOTES
[facility] utilizes an encounter to take the place of a direct phone call to your office. Please take a moment to review the below request. Please reply or route message to author of this encounter.  Message will act as a verbal OK of orders requested below. Thank you.    ORDER  Requesting recertification orders for 1xwk SN visits for med mgmt, MH assess, DM assess, pain assess, fall prevention    MD SUMMARY/PLAN OF CARE    MD Summary-Recert  Pt is a 64 yr old female who has been a long time [facility] patient for Disease Management/Education and assistance with weekly med set up and  compliance assessment.   With homecare involvement client has improved her medication compliance with a rare missed dose.   She reports feeling better and less hallucinations when she takes her medications as set up by nursing.  She reports better pain control with scheduled tramadol/gabapentin/tylenol.  She follows with ortho and completed outpatient PT for knee pain. She continues to follow with [facility] for her MH medication dosing instructions, HC updates PMD of any changes.    Pt has ostomy which she manages independently, pt also has implant  which has resulted in her remaining continent of urine most of the time, she has an appointment with urology to determine if the stimulator can be removed as client thinks this is causing increased pain,  Client is a high fall risk due to imbalance, loose rugs, poor judgement and a dog in the home.  No recent falls, last fall May 2019.    A/O x 3 with forgetfulness, speech is clear, she is pleasant and cooperative with in home nurse visits.   Continued evaluation of compliance with ADA diet and carb counting needed, she has an appointment with endocrine 3/27/20.   Nursing continues to provide positive reinforcement for compliance with medications and good food choices, she has taken all of her medications over the last 60 days and reports improved MH symptoms.     BACKGROUND//Primary medical diagnosis is Severe major depression with psychotic features, pt also suffers from occasional memory loss and is Type 2 diabetic.  ANALYSIS// High risk for hospitalization r/t poorly controlled diabetes and history of noncompliance/forgetfulness with taking high risk medications correctly.  She will require skilled interventions to manage her health at home. RECOMMENDATION// Recommend SN visits for Medication management/education, pt/CG education on ADA diet, skin integrity assessments/teaching.  Chronic pain management/education. She will require skilled interventions to manage her health at home. Expected length of home care is  ongoing HC need for med mgmt.      **Patient/caregiver educated on security, storage and disposal of controlled substances per handout.                                 **Emergency Plan created and reviewed with patient and left in home                                 **Patient Identified Goal// No change in goal:  'Still try to get my back and knees fixed'  **Patient Strength:  Client is open to discuss her health and is willing to try to make lifestyle changes to manage her chronic diseases.  Client has demonstrated medication compliance with reported better symptom management.    EVELYN Orellana  [phone]  [email or web address]

## 2020-02-16 ENCOUNTER — MEDICAL CORRESPONDENCE (OUTPATIENT)
Dept: HEALTH INFORMATION MANAGEMENT | Facility: CLINIC | Age: 65
End: 2020-02-16

## 2020-02-19 LAB — PHQ9 SCORE: 1

## 2020-02-20 DIAGNOSIS — Z53.9 DIAGNOSIS NOT YET DEFINED: Primary | ICD-10-CM

## 2020-02-20 PROCEDURE — G0179 MD RECERTIFICATION HHA PT: HCPCS | Performed by: INTERNAL MEDICINE

## 2020-03-12 DIAGNOSIS — M17.10 ARTHRITIS OF KNEE: ICD-10-CM

## 2020-03-12 DIAGNOSIS — M25.562 ARTHRALGIA OF BOTH KNEES: ICD-10-CM

## 2020-03-12 DIAGNOSIS — M25.561 ARTHRALGIA OF BOTH KNEES: ICD-10-CM

## 2020-03-12 NOTE — TELEPHONE ENCOUNTER
TRAMADOL 50 MG  Last Written Prescription Date:  02/05/2020  Last Fill Quantity: 45 TABLETS,  # refills: 0   Last office visit: 12/10/2019 with prescribing provider:    Last refill per Pharmacy   Future Office Visit:   Next 5 appointments (look out 90 days)    Mar 27, 2020  9:25 AM CDT  Return Visit with Eilzabeth Medrano MD, [facility] ENDO NURSE  [facility] [address] [place] [phone]  [phone]         Ralph Price [facility]

## 2020-03-13 RX ORDER — TRAMADOL HYDROCHLORIDE 50 MG/1
TABLET ORAL
Qty: 45 TABLET | Refills: 0 | Status: SHIPPED | OUTPATIENT
Start: 2020-03-13 | End: 2020-03-31

## 2020-03-20 ENCOUNTER — TELEPHONE (OUTPATIENT)
Dept: ORTHOPEDICS | Facility: CLINIC | Age: 65
End: 2020-03-20

## 2020-03-20 NOTE — TELEPHONE ENCOUNTER
"LOV 12/17/19 for Left knee OA and chronic low back pain.  Has been having knee pain for \"a long time now.\" Having a hard time sleeping and feels like her knee is popping. Feels like she can't lift it.  Had an appointment for 3/27 and this was cancelled and moved to June. She does takes Tramadol 1/2 tab three time daily as prescribed by Dr. King.  Wondering what else she can take or do to help with the knee pain.  As tried OTC's without help. Will send to Dr. Harrington to review.  Yaa Eden RN    "

## 2020-03-20 NOTE — TELEPHONE ENCOUNTER
Health Call Center    Phone Message    May a detailed message be left on voicemail: yes     Reason for Call: Other: Patient is calling to request pain medication. She wants pain killer and muscle relaxers. She states her pain is a 10+ when she tries to use it.      [id number] - [place], [place] - [address]      Action Taken: Message routed to:  Adult Clinics: Sports Medicine p [phone]

## 2020-03-23 NOTE — TELEPHONE ENCOUNTER
The patient was contacted today to discuss her increased knee pain.  Dr. Harrington is recommending a knee cortisone injection and a brace. The patient is agreeable and an appointment was made.

## 2020-03-25 PROBLEM — M54.41 CHRONIC RIGHT-SIDED LOW BACK PAIN WITH RIGHT-SIDED SCIATICA: Status: RESOLVED | Noted: 2019-12-23 | Resolved: 2020-03-25

## 2020-03-25 PROBLEM — G89.29 CHRONIC RIGHT-SIDED LOW BACK PAIN WITH RIGHT-SIDED SCIATICA: Status: RESOLVED | Noted: 2019-12-23 | Resolved: 2020-03-25

## 2020-03-25 NOTE — PROGRESS NOTES
DISCHARGE SUMMARY    Holly Muir was seen 4 times for evaluation and treatment.  Patient did not return for further treatment and current status is unknown.  Due to short treatment duration, no objective or functional changes were made.  Please see goal flow sheet from episode noted date below and initial evaluation for further information.  Patient is discharged from therapy and therapy episode is resolved as of 03/25/20.      Linked Episodes   Type: Episode: Status: Noted: Resolved: Last update: Updated by:   PHYSICAL THERAPY low back pain 12/23/19 Active 12/23/2019 1/27/2020  1:56 PM Casey Sadler, PT      Comments:

## 2020-03-27 ENCOUNTER — TELEPHONE (OUTPATIENT)
Dept: ORTHOPEDICS | Facility: CLINIC | Age: 65
End: 2020-03-27

## 2020-03-27 NOTE — TELEPHONE ENCOUNTER
Attempted to leave message for 24 Hour Covid screening. ( For Monday 3-30-20) appointment  VM box full, unable to leave message.

## 2020-03-30 ENCOUNTER — OFFICE VISIT (OUTPATIENT)
Dept: ORTHOPEDICS | Facility: CLINIC | Age: 65
End: 2020-03-30
Payer: MEDICARE

## 2020-03-30 VITALS — BODY MASS INDEX: 40.12 KG/M2 | WEIGHT: 218 LBS | HEIGHT: 62 IN

## 2020-03-30 DIAGNOSIS — M17.12 PRIMARY OSTEOARTHRITIS OF LEFT KNEE: Primary | ICD-10-CM

## 2020-03-30 PROCEDURE — 99212 OFFICE O/P EST SF 10 MIN: CPT | Mod: 25 | Performed by: FAMILY MEDICINE

## 2020-03-30 PROCEDURE — 20610 DRAIN/INJ JOINT/BURSA W/O US: CPT | Mod: LT | Performed by: FAMILY MEDICINE

## 2020-03-30 RX ORDER — TRIAMCINOLONE ACETONIDE 40 MG/ML
40 INJECTION, SUSPENSION INTRA-ARTICULAR; INTRAMUSCULAR
Status: DISCONTINUED | OUTPATIENT
Start: 2020-03-30 | End: 2022-09-14

## 2020-03-30 RX ORDER — HYDROCODONE BITARTRATE AND ACETAMINOPHEN 5; 325 MG/1; MG/1
1 TABLET ORAL
Qty: 20 TABLET | Refills: 0 | Status: SHIPPED | OUTPATIENT
Start: 2020-03-30 | End: 2020-04-02

## 2020-03-30 RX ADMIN — TRIAMCINOLONE ACETONIDE 40 MG: 40 INJECTION, SUSPENSION INTRA-ARTICULAR; INTRAMUSCULAR at 10:54

## 2020-03-30 ASSESSMENT — MIFFLIN-ST. JEOR: SCORE: 1492.09

## 2020-03-30 NOTE — LETTER
"    3/30/2020         RE: Holly Muir  [address] [facility] [id number]        Dear Colleague,    Thank you for referring your patient, Holly Muir, to the [email or web address] Please see a copy of my visit note below.    HISTORY OF PRESENT ILLNESS  Ms. Muir is a pleasant 65 year old female following up with left knee osteoarthritis.  Holly has been experiencing quite a bit of knee pain over the past 2 months or so.  The injection helped her for some time, although her pain is now returned.  She was seen by Dr. Lord a couple months ago, at that visit they performed a CT and discussed possible bracing.  She is having quite a bit of difficulty sleeping.     PHYSICAL EXAM  Vitals:    03/30/20 1052   Weight: 98.9 kg (218 lb)   Height: 1.575 m (5' 2\")       ASSESSMENT & PLAN  Ms. Muir is a 65 year old female following up with left knee osteoarthritis.    I did repeat her knee injection (see procedure note).    I had a long discussion with Holly centering around her pain, aside from the procedure we spent approximately 20 minutes in the room, more than half of which was spent in counseling and regarding possible treatment options for her pain.    Ultimately I did prescribe her a narcotic, she does know that this is something that she should use sparingly and only as needed.    It was a pleasure seeing Holly.        Santy Harrington DO, CAQSM      This note was constructed using Dragon dictation software, please excuse any minor errors in spelling, grammar, or syntax.      Scribed by Sophie Osborne ATC for Dr. Harrington on 3/30/20 at 11:00 AM, based on the providers statements to me.       [facility] FOLLOW-UP VISIT 3/30/2020    Holly Muir's chief complaint for this visit includes:  Chief Complaint   Patient presents with     RECHECK     left knee joint cortisone injection      PCP: Tiffany King    Referring Provider:  No referring provider defined for this " "encounter.    Ht 1.575 m (5' 2\")   Wt 98.9 kg (218 lb)   BMI 39.87 kg/m    Data Unavailable         Medical History:    Any recent changes to your medical history? No    Any new medication prescribed since last visit? No    Review of Systems:    Do you have fever, chills, weight loss? No    Do you have any vision problems? No    Do you have any chest pain or edema? No    Do you have any shortness of breath or wheezing?  No    Do you have stomach problems? No    Do you have any urinary track issues? No    Do you have any numbness or focal weakness? No    Do you have diabetes? Yes, insulin dependent    Do you have problems with bleeding or clotting? No    Do you have an rashes or other skin lesions? No    Large Joint Injection/Arthocentesis: L knee joint    Date/Time: 3/30/2020 10:54 AM  Performed by: Santy Harrington DO  Authorized by: Santy Harrington DO     Indications:  Pain  Needle Size:  22 G  Guidance: landmark guided    Location:  Knee      Medications:  40 mg triamcinolone 40 MG/ML  Outcome:  Tolerated well, no immediate complications  Procedure discussed: discussed risks, benefits, and alternatives    Consent Given by:  Patient  Timeout: timeout called immediately prior to procedure    Prep: patient was prepped and draped in usual sterile fashion     PROCEDURE    Knee Injection - Intraarticular  The patient was informed of the risks and the benefits of the procedure and a written consent was signed.  The patient s left knee was prepped with chlorhexidine in sterile fashion.   40 mg of triamcinolone suspension was drawn up into a 5 mL syringe with 4 mL of 1% lidocaine.  Injection was performed using substerile technique.  A 1.5-inch 22-gauge needle was used to enter the lateral aspect of the left knee.  Injection performed successfully without difficulty.  There were no complications. The patient tolerated the procedure well. There was negligible bleeding.   The patient was instructed to ice the knee upon " leaving clinic and refrain from overuse over the next 3 days.   The patient was instructed to call or go to the emergency room with any unusual pain, swelling, redness, or if otherwise concerned.                Again, thank you for allowing me to participate in the care of your patient.        Sincerely,        Santy Harrington, DO

## 2020-03-30 NOTE — PROGRESS NOTES
"HISTORY OF PRESENT ILLNESS  Ms. Muir is a pleasant 65 year old female following up with left knee osteoarthritis.  Holly has been experiencing quite a bit of knee pain over the past 2 months or so.  The injection helped her for some time, although her pain is now returned.  She was seen by Dr. Lord a couple months ago, at that visit they performed a CT and discussed possible bracing.  She is having quite a bit of difficulty sleeping.     PHYSICAL EXAM  Vitals:    03/30/20 1052   Weight: 98.9 kg (218 lb)   Height: 1.575 m (5' 2\")       ASSESSMENT & PLAN  Ms. Muir is a 65 year old female following up with left knee osteoarthritis.    I did repeat her knee injection (see procedure note).    I had a long discussion with Holly centering around her pain, aside from the procedure we spent approximately 20 minutes in the room, more than half of which was spent in counseling and regarding possible treatment options for her pain.    Ultimately I did prescribe her a narcotic, she does know that this is something that she should use sparingly and only as needed.    It was a pleasure seeing Holly.        Santy Harrington, DO, CAQSM      This note was constructed using Dragon dictation software, please excuse any minor errors in spelling, grammar, or syntax.      Scribed by Sophie Osborne ATC for Dr. Harrington on 3/30/20 at 11:00 AM, based on the providers statements to me.       [facility] FOLLOW-UP VISIT 3/30/2020    Holly Muir's chief complaint for this visit includes:  Chief Complaint   Patient presents with     RECHECK     left knee joint cortisone injection      PCP: Tiffany King    Referring Provider:  No referring provider defined for this encounter.    Ht 1.575 m (5' 2\")   Wt 98.9 kg (218 lb)   BMI 39.87 kg/m    Data Unavailable         Medical History:    Any recent changes to your medical history? No    Any new medication prescribed since last visit? No    Review of Systems:    Do you have fever, " chills, weight loss? No    Do you have any vision problems? No    Do you have any chest pain or edema? No    Do you have any shortness of breath or wheezing?  No    Do you have stomach problems? No    Do you have any urinary track issues? No    Do you have any numbness or focal weakness? No    Do you have diabetes? Yes, insulin dependent    Do you have problems with bleeding or clotting? No    Do you have an rashes or other skin lesions? No    Large Joint Injection/Arthocentesis: L knee joint    Date/Time: 3/30/2020 10:54 AM  Performed by: Santy Harrington DO  Authorized by: Santy Harrington DO     Indications:  Pain  Needle Size:  22 G  Guidance: landmark guided    Location:  Knee      Medications:  40 mg triamcinolone 40 MG/ML  Outcome:  Tolerated well, no immediate complications  Procedure discussed: discussed risks, benefits, and alternatives    Consent Given by:  Patient  Timeout: timeout called immediately prior to procedure    Prep: patient was prepped and draped in usual sterile fashion     PROCEDURE    Knee Injection - Intraarticular  The patient was informed of the risks and the benefits of the procedure and a written consent was signed.  The patient s left knee was prepped with chlorhexidine in sterile fashion.   40 mg of triamcinolone suspension was drawn up into a 5 mL syringe with 4 mL of 1% lidocaine.  Injection was performed using substerile technique.  A 1.5-inch 22-gauge needle was used to enter the lateral aspect of the left knee.  Injection performed successfully without difficulty.  There were no complications. The patient tolerated the procedure well. There was negligible bleeding.   The patient was instructed to ice the knee upon leaving clinic and refrain from overuse over the next 3 days.   The patient was instructed to call or go to the emergency room with any unusual pain, swelling, redness, or if otherwise concerned.

## 2020-03-31 ENCOUNTER — TELEPHONE (OUTPATIENT)
Dept: PEDIATRICS | Facility: CLINIC | Age: 65
End: 2020-03-31

## 2020-03-31 ENCOUNTER — TELEPHONE (OUTPATIENT)
Dept: ENDOCRINOLOGY | Facility: CLINIC | Age: 65
End: 2020-03-31

## 2020-03-31 DIAGNOSIS — M25.562 ARTHRALGIA OF BOTH KNEES: ICD-10-CM

## 2020-03-31 DIAGNOSIS — M17.10 ARTHRITIS OF KNEE: ICD-10-CM

## 2020-03-31 DIAGNOSIS — M25.561 ARTHRALGIA OF BOTH KNEES: ICD-10-CM

## 2020-03-31 RX ORDER — TRAMADOL HYDROCHLORIDE 50 MG/1
TABLET ORAL
Qty: 45 TABLET | Refills: 0 | Status: SHIPPED | OUTPATIENT
Start: 2020-04-12 | End: 2020-04-28

## 2020-03-31 NOTE — TELEPHONE ENCOUNTER
This is not due for refill until 4/12/2020. It was approved for this date. Let pt know. If she is expecting to get it sooner, need to know how often she is taking. If she is staying with the 1/2 tablet tid.

## 2020-03-31 NOTE — TELEPHONE ENCOUNTER
Pt called back she does not need refill right now she states she has two weeks left. Pt does still take 1/2 TID. Berenice JESUS,CMA

## 2020-03-31 NOTE — TELEPHONE ENCOUNTER
Cde spoke with pt's Home Health nurse, Meredith, and with the patient. Both were advised to increase Basaglar dose from 55 to 60 units daily starting tonight. Pt advised to call Cde if fasting bg levels fall below 70. Pt verbalized understanding.      Mikayla Sellers RN, BSN, CDE   [facility]

## 2020-03-31 NOTE — TELEPHONE ENCOUNTER
Meredith (Homecare Nurse) left message on clinic voicemail. Patient had steroid injection in knee yesterday and BG [id number]. Nurse wondering if any dose adjustments are needed.    Meredith [phone] or patient [phone].    Will forward to LEORA Saldana.    Argelia Maciel LPN  Diabetes Clinic Coordinator   Adult Endocrinology and Pediatric Specialty Clinics  [facility]

## 2020-03-31 NOTE — TELEPHONE ENCOUNTER
Requested Prescriptions   Pending Prescriptions Disp Refills     traMADol (ULTRAM) 50 MG tablet [Pharmacy Med Name: TRAMADOL HCL 50 MG TABLET] 45 tablet 0     Sig: TAKE 1/2 TABLET BY MOUTH 3 TIMES A DAY       There is no refill protocol information for this order        Routing refill request to provider for review/approval because:  Drug not on the [facility] refill protocol     Amairani Hernandez RN, BSN, PHN  [facility]

## 2020-04-02 ENCOUNTER — TELEPHONE (OUTPATIENT)
Dept: ORTHOPEDICS | Facility: CLINIC | Age: 65
End: 2020-04-02

## 2020-04-02 NOTE — TELEPHONE ENCOUNTER
Health Call Center    Phone Message    May a detailed message be left on voicemail: yes     Reason for Call: Other: Patient states that Dr. Harrington was to put in a referral for her to see someone for a leg brace. Please advise.     Action Taken: Message routed to:  Adult Clinics: Sports Medicine p [phone]

## 2020-04-07 ENCOUNTER — TELEPHONE (OUTPATIENT)
Dept: PHYSICAL THERAPY | Facility: CLINIC | Age: 65
End: 2020-04-07

## 2020-04-07 NOTE — TELEPHONE ENCOUNTER
Called and left voicemail. Instructed pt to call [facility] number if interested in video or phone visit.

## 2020-04-09 NOTE — TELEPHONE ENCOUNTER
Patient was contacted and a voicemail was left.     Her knee brace was discussed with Dr. Lord team, they are suggesting Holly to be fitted with a knee immobilizer.  The patient can be scheduled with an ATC or RN to be fitted.

## 2020-04-10 DIAGNOSIS — M25.562 ARTHRALGIA OF BOTH KNEES: Primary | ICD-10-CM

## 2020-04-10 DIAGNOSIS — M25.561 ARTHRALGIA OF BOTH KNEES: Primary | ICD-10-CM

## 2020-04-10 NOTE — TELEPHONE ENCOUNTER
Tylenol  Last Written Prescription Date:  8-6-19  Last Fill Quantity: blank,  # refills: 99   Last office visit: 12/10/2019 with prescribing provider:  laya   Future Office Visit:   Next 5 appointments (look out 90 days)    Apr 14, 2020 10:00 AM CDT  Nurse Only with [facility] NURSE ONLY ORTH[facility]  [facility] [address] [place] [id number] [phone]

## 2020-04-13 ENCOUNTER — TELEPHONE (OUTPATIENT)
Dept: PEDIATRICS | Facility: CLINIC | Age: 65
End: 2020-04-13

## 2020-04-13 NOTE — TELEPHONE ENCOUNTER
This is a reported medication.    Routing to Dr. King to please review request.    Liz Boogie RN, [facility]

## 2020-04-13 NOTE — TELEPHONE ENCOUNTER
[facility] utilizes an encounter to take the place of a direct phone call to your office. Please take a moment to review the below request. Please reply or route message to author of this encounter.  Message will act as a verbal OK of orders requested below. Thank you.    ORDER Requesting recertification orders for 1xwk SN visits, 3PRN for medication set tup/mgmt/compliance assess, DM assess/ed, MH assess, [facility] for chronic disease management    EVELYN Orellana  [phone]  [email or web address]

## 2020-04-14 ENCOUNTER — ALLIED HEALTH/NURSE VISIT (OUTPATIENT)
Dept: NURSING | Facility: CLINIC | Age: 65
End: 2020-04-14
Payer: MEDICARE

## 2020-04-14 DIAGNOSIS — M17.12 PRIMARY OSTEOARTHRITIS OF LEFT KNEE: Primary | ICD-10-CM

## 2020-04-14 PROCEDURE — 99207 ZZC NO BILLABLE SERVICE THIS VISIT: CPT

## 2020-04-14 RX ORDER — ACETAMINOPHEN 500 MG/1
TABLET ORAL
Qty: 200 TABLET | Refills: 11 | Status: SHIPPED | OUTPATIENT
Start: 2020-04-14 | End: 2021-04-20

## 2020-04-14 NOTE — PROGRESS NOTES
The patient came into clinic today for a knee brace. She has general lateral knee pain, not specific posterior patellar pole pain. The patient was correctly fitted for a left lateral J brace.  The patient states that the brace feel supportive.  The patient was educated on the used of the brace.

## 2020-04-15 ENCOUNTER — MEDICAL CORRESPONDENCE (OUTPATIENT)
Dept: HEALTH INFORMATION MANAGEMENT | Facility: CLINIC | Age: 65
End: 2020-04-15

## 2020-04-27 ENCOUNTER — OFFICE VISIT (OUTPATIENT)
Dept: NURSING | Facility: CLINIC | Age: 65
End: 2020-04-27
Payer: MEDICARE

## 2020-04-27 ENCOUNTER — TELEPHONE (OUTPATIENT)
Dept: PEDIATRICS | Facility: CLINIC | Age: 65
End: 2020-04-27

## 2020-04-27 VITALS
WEIGHT: 218 LBS | SYSTOLIC BLOOD PRESSURE: 150 MMHG | BODY MASS INDEX: 40.12 KG/M2 | HEART RATE: 75 BPM | DIASTOLIC BLOOD PRESSURE: 85 MMHG | HEIGHT: 62 IN

## 2020-04-27 DIAGNOSIS — M25.562 ARTHRALGIA OF BOTH KNEES: ICD-10-CM

## 2020-04-27 DIAGNOSIS — M25.561 ARTHRALGIA OF BOTH KNEES: ICD-10-CM

## 2020-04-27 DIAGNOSIS — M17.12 PRIMARY OSTEOARTHRITIS OF LEFT KNEE: Primary | ICD-10-CM

## 2020-04-27 DIAGNOSIS — M17.10 ARTHRITIS OF KNEE: ICD-10-CM

## 2020-04-27 PROCEDURE — 99207 ZZC NO BILLABLE SERVICE THIS VISIT: CPT

## 2020-04-27 ASSESSMENT — MIFFLIN-ST. JEOR: SCORE: 1492.09

## 2020-04-27 ASSESSMENT — PAIN SCALES - GENERAL: PAINLEVEL: MODERATE PAIN (5)

## 2020-04-27 NOTE — TELEPHONE ENCOUNTER
Requested Prescriptions   Pending Prescriptions Disp Refills     traMADol (ULTRAM) 50 MG tablet [Pharmacy Med Name: TRAMADOL HCL 50 MG TABLET] 45 tablet 0     Sig: TAKE 1/2 TABLET BY MOUTH 3 TIMES A DAY       There is no refill protocol information for this order      Last Written Prescription Date:  4/12/20  Last Fill Quantity: 45,  # refills: 0   Last office visit: 12/10/2019 with prescribing provider:     Future Office Visit:      Routing refill request to provider for review/approval because:  Drug not on the [facility] refill protocol

## 2020-04-27 NOTE — PROGRESS NOTES
Pt. Came in to get fit for a different brace. She currently has a lateral J hinge brace. She feels as though this doesn't fit quite right and she has some trouble pulling this up her leg. She has had the wrap around knee hinge brace before but the velcro had fallen off. We tried another wrap around hinge brace today and this fit well. Dispensed L Wrap Around knee hinge brace today. DME purchase explained to patient. DME form signed.

## 2020-04-27 NOTE — PATIENT INSTRUCTIONS
Thanks for coming today.  Ortho/Sports Medicine Clinic  [address] [place] [address]    To schedule future appointments in Ortho Clinic, you may call [phone].    To schedule ordered imaging by your provider:   Call [facility] [phone]    To schedule an injection ordered by your provider:  Call [facility] Injection scheduling line: [phone]  [employer] available online at:  [email or web address]    Please call if any further questions or concerns ([phone]).  Clinic hours 8 am to 5 pm.    Return to clinic (call) if symptoms worsen or fail to improve.

## 2020-04-27 NOTE — LETTER
4/27/2020         RE: Holly Muir  [address] [facility] [address] [facility] [address]        Dear Colleague,    Thank you for referring your patient, Holly Muir, to the [facility]. Please see a copy of my visit note below.    Pt. Came in to get fit for a different brace. She currently has a lateral J hinge brace. She feels as though this doesn't fit quite right and she has some trouble pulling this up her leg. She has had the wrap around knee hinge brace before but the velcro had fallen off. We tried another wrap around hinge brace today and this fit well. Dispensed L Wrap Around knee hinge brace today. DME purchase explained to patient. DME form signed.     Again, thank you for allowing me to participate in the care of your patient.        Sincerely,        Nurse Only Appointment

## 2020-04-28 RX ORDER — TRAMADOL HYDROCHLORIDE 50 MG/1
TABLET ORAL
Qty: 45 TABLET | Refills: 0 | Status: SHIPPED | OUTPATIENT
Start: 2020-05-12 | End: 2020-05-05

## 2020-04-28 NOTE — TELEPHONE ENCOUNTER
Please call pt to check on her pain level and if she is taking more Tramadol. Last refill should last until 5/12.

## 2020-04-28 NOTE — TELEPHONE ENCOUNTER
Spoke with pt, notified 2 weeks early, pt reports taking as directed. Reports begin low on rx, maybe has a week left. Notified will route message to provider. Destiny Liu LPN

## 2020-04-28 NOTE — TELEPHONE ENCOUNTER
Let pt know that I approved it for May 12.   If she thinks she needs to fill it earlier, have home care RN check her pill box and call us which date she needs refill.

## 2020-04-30 DIAGNOSIS — Z53.9 DIAGNOSIS NOT YET DEFINED: Primary | ICD-10-CM

## 2020-04-30 PROCEDURE — G0179 MD RECERTIFICATION HHA PT: HCPCS | Performed by: INTERNAL MEDICINE

## 2020-05-04 ENCOUNTER — TELEPHONE (OUTPATIENT)
Dept: UROLOGY | Facility: CLINIC | Age: 65
End: 2020-05-04

## 2020-05-04 NOTE — TELEPHONE ENCOUNTER
RN calling about the tramadol - states that they will be seeing patient on 11th so requesting a refill on May 7th or 8th so they have the pills at the patients home by the time they go in to clinic.    Wanting a 90 day refill so they dont need to continue to call as theyre trying to be proactive due to possible delays because of COVID.    Patient uses [facility].

## 2020-05-04 NOTE — TELEPHONE ENCOUNTER
Patient called clinic.  received by patient. Patient will need appointment with [employer] rep to turn off interstim to device to see if that is what is contributing to her back pain. Informed patient that clinic will look into when an appointment can be made and will get back to her. Patient was ok with this plan and had no further questions.    Daniella Mi LPN

## 2020-05-05 RX ORDER — TRAMADOL HYDROCHLORIDE 50 MG/1
TABLET ORAL
Qty: 135 TABLET | Refills: 0 | Status: SHIPPED | OUTPATIENT
Start: 2020-05-07 | End: 2020-08-26

## 2020-05-05 NOTE — TELEPHONE ENCOUNTER
Called home care nurse gave her provider message. She states this will be real helpful. Thanks.Berenice JESUS CMA

## 2020-05-05 NOTE — TELEPHONE ENCOUNTER
Let pt know that I sent the 90 days supply. She can have it filled on 5/7 and let home care RN set them out in her pill box.

## 2020-05-05 NOTE — TELEPHONE ENCOUNTER
Called and spoke to patient regarding note below. Patient reports that she has the  but is unable to work with it. Offered for patient to have a nurse visit to discuss interstim device with [employer] Rep and patient would like that. Nurse only appointment scheduled for 5/7/20 at 11:00am.     Alejandra Forde RN, BSN

## 2020-05-14 ENCOUNTER — ALLIED HEALTH/NURSE VISIT (OUTPATIENT)
Dept: NURSING | Facility: CLINIC | Age: 65
End: 2020-05-14
Payer: MEDICARE

## 2020-05-14 DIAGNOSIS — R39.15 URINARY URGENCY: Primary | ICD-10-CM

## 2020-05-14 NOTE — NURSING NOTE
Holly Muir comes into clinic today at the request of Dr. Ysuuf Ordering Provider for Pt Teaching discuss Interstim, turn device off.    Attempted to sync patient's interstim device using patient's iCon  and after repositioning multiple times, was unsuccessful.     Attempted to sync patient's interstim device using clinician N'vision and after multiple attempts, was unable to sync device. Writer spoke with [employer] representative and if unable to sync using clinician N'vision, then patient's implanted battery is likely depleted. Discussed with patient who is aware that the battery is likely depleted. Telephone visit with Dr. Yusuf scheduled for 5/18/20 to discuss plan.    This service provided today was under the supervising provider of the day Dr. Corral, who was available if needed.    Alejandra Forde RN

## 2020-05-18 ENCOUNTER — VIRTUAL VISIT (OUTPATIENT)
Dept: UROLOGY | Facility: CLINIC | Age: 65
End: 2020-05-18
Payer: MEDICARE

## 2020-05-18 DIAGNOSIS — R15.2 FECAL URGENCY: Primary | ICD-10-CM

## 2020-05-18 PROCEDURE — 99442: CPT | Mod: 95 | Performed by: UROLOGY

## 2020-05-18 RX ORDER — CEFAZOLIN SODIUM 1 G
1 VIAL (EA) INJECTION SEE ADMIN INSTRUCTIONS
Status: CANCELLED | OUTPATIENT
Start: 2020-05-18

## 2020-05-18 NOTE — PROGRESS NOTES
Reason for Visit:  Removal of interstim device.    Clinical Data: Ms. Muir is a 64 year old female with hx of urgency and urinary retention who voids by crede is wondering about interstim to possibly see if it brianna help her void better. The patient then underwent the stage I procedure on 3/10/15 and was very happy. She states that she has had 100% improvement in her symptoms. After final implant she last reported no incontinence in 2015. She feels that she is voiding without any difficulty.     Now, the patient thinks that the Interstim device is causing back pain as it started after the stage 2 implant was placed. She would like the device removed since it is not effective and causing pain. She states that the device is turned on, but she feels no stimulation in the vaginal area or elsewhere. She cannot turn the device off because she lost the remote a few months ago. [employer] is supposedly sending her a new remote.  She denies any urinary issues.    Attempts were made to make adjustments using her controller and the device in the clinic but it would not sync.  Therefore the battery is likely depleted.     Of note, she was also referred to see ortho/sports medicine to evaluate for other possible causes of her back pain. She did have an xray in 2017 showing osteoarthritic change and disc space narrowing at L4-L5 and L5-S1. She was referred to physical therapy for her back and MRI was also recommended to see if an injection may help her (though I do not see that an MRI was ever ordered).    Reviewed PMhx, SHx, medications, and Allergies.    A/P: 64 y/o female doing well from a urinary standpoint with interstim implant even though her battery is depleted.  We discussed options of observation, replacing the battery, and removal of the entire device and she would like to replace the battery.  -schedule battery replacement in [place].    Thank you for allowing me to participate in the care of Ms. Holly Muir  and I will keep you updated on her progress.    Teodora Yusuf MD    11 min spent with patient on the phone

## 2020-05-18 NOTE — PROGRESS NOTES
"Holly Muir is a 65 year old female who is being evaluated via a billable telephone visit.      The patient has been notified of following:     \"This telephone visit will be conducted via a call between you and your physician/provider. We have found that certain health care needs can be provided without the need for a physical exam.  This service lets us provide the care you need with a short phone conversation.  If a prescription is necessary we can send it directly to your pharmacy.  If lab work is needed we can place an order for that and you can then stop by our lab to have the test done at a later time.    Telephone visits are billed at different rates depending on your insurance coverage. During this emergency period, for some insurers they may be billed the same as an in-person visit.  Please reach out to your insurance provider with any questions.    If during the course of the call the physician/provider feels a telephone visit is not appropriate, you will not be charged for this service.\"    Patient has given verbal consent for Telephone visit?  Yes    What phone number would you like to be contacted at? [phone]    How would you like to obtain your AVS? Dinesh Mi LPN on 5/18/2020 at 9:05 AM    "

## 2020-05-20 DIAGNOSIS — Z11.59 ENCOUNTER FOR SCREENING FOR OTHER VIRAL DISEASES: Primary | ICD-10-CM

## 2020-05-20 PROBLEM — R15.2 FECAL URGENCY: Status: ACTIVE | Noted: 2020-05-20

## 2020-05-20 NOTE — TELEPHONE ENCOUNTER
BusPIRone HCl 30 MG TABS     Last Written Prescription Date: 4/5/2017  Last Fill Quantity: 60; # refills: 0  Last Office Visit with [facility] or [facility] prescribing provider:  12/19/2016   Next 5 appointments (look out 90 days)     May 26, 2017  9:30 AM CDT   Return Visit with Elizabeth Medrano MD   [facility] ([facility]    [address] [place] [id number]   [phone]                   Last PHQ-9 score on record=   PHQ-9 SCORE 12/1/2016   Total Score -   Total Score 7       Lab Results   Component Value Date    AST 29 12/19/2016     Lab Results   Component Value Date    ALT 58 12/19/2016     Refilled per [facility] protocol.    Helena Dickson RN     No

## 2020-06-01 ENCOUNTER — TELEPHONE (OUTPATIENT)
Dept: PEDIATRICS | Facility: CLINIC | Age: 65
End: 2020-06-01

## 2020-06-01 DIAGNOSIS — E88.819 INSULIN RESISTANCE: Primary | ICD-10-CM

## 2020-06-02 NOTE — TELEPHONE ENCOUNTER
[facility] Call Center    Phone Message    May a detailed message be left on voicemail: no     Reason for Call: Order(s): Other:   Reason for requested: Pt having some allergy symptoms. Patient taking Benadryl. Nurse asking if this is ok.   Date needed: Today  Provider name: Fernando.         Action Taken: Travel Screening:                                                                       
Noted.  
Spoke to Meredith,  homecare RN.  She states the patient deals with itchy, watery eyes, nasal congestion this time of year.  She has ear pain that is mild in left ear.  She would like Dr. King (only wants to see Dr. King) about this on Thursday.  Scheduled on 5/4/2020 with Dr. King    She is ok to wait until then, she will call back if there are any worsening concerns prior.to the appt.    Liz Boogie RN, [facility]    
Yes

## 2020-06-04 ENCOUNTER — OFFICE VISIT (OUTPATIENT)
Dept: PEDIATRICS | Facility: CLINIC | Age: 65
End: 2020-06-04
Payer: MEDICARE

## 2020-06-04 VITALS
WEIGHT: 215.5 LBS | SYSTOLIC BLOOD PRESSURE: 128 MMHG | DIASTOLIC BLOOD PRESSURE: 60 MMHG | HEART RATE: 72 BPM | TEMPERATURE: 97.8 F | OXYGEN SATURATION: 100 % | BODY MASS INDEX: 39.42 KG/M2

## 2020-06-04 DIAGNOSIS — J30.2 SEASONAL ALLERGIC RHINITIS, UNSPECIFIED TRIGGER: ICD-10-CM

## 2020-06-04 DIAGNOSIS — S09.91XA INJURY OF EAR CANAL, INITIAL ENCOUNTER: Primary | ICD-10-CM

## 2020-06-04 PROCEDURE — 99214 OFFICE O/P EST MOD 30 MIN: CPT | Performed by: INTERNAL MEDICINE

## 2020-06-04 RX ORDER — PIOGLITAZONEHYDROCHLORIDE 45 MG/1
45 TABLET ORAL DAILY
Qty: 90 TABLET | Refills: 3 | Status: SHIPPED | OUTPATIENT
Start: 2020-06-04 | End: 2021-08-10

## 2020-06-04 RX ORDER — CETIRIZINE HYDROCHLORIDE 10 MG/1
10 TABLET ORAL DAILY
Qty: 30 TABLET | Refills: 5 | Status: SHIPPED | OUTPATIENT
Start: 2020-06-04 | End: 2020-11-20

## 2020-06-04 NOTE — NURSING NOTE
"Chief Complaint   Patient presents with     Ear Problem     left sided ear pain x 1 week       Initial /60 (BP Location: Right arm, Patient Position: Sitting, Cuff Size: Adult Regular)   Pulse 72   Temp 97.8  F (36.6  C) (Temporal)   Wt 97.8 kg (215 lb 8 oz)   SpO2 100%   Breastfeeding No   BMI 39.42 kg/m   Estimated body mass index is 39.42 kg/m  as calculated from the following:    Height as of 4/27/20: 1.575 m (5' 2\").    Weight as of this encounter: 97.8 kg (215 lb 8 oz).  Medication Reconciliation: complete      JERED Oconnor      "

## 2020-06-04 NOTE — PROGRESS NOTES
Subjective     Holly Muir is a 65 year old female who presents to clinic today for the following health issues:    HPI   Acute Illness   Acute illness concerns: left sided ear pain, sinus  Onset: 1 week    Fever: no    Chills/Sweats: no    Headache (location?): no    Sinus Pressure:YES- facial pain    Conjunctivitis:  Watery eyes    Ear Pain: YES: left, feels like there is fluid in the ear    Rhinorrhea: YES    Congestion: no    Sore Throat: no     Cough: no    Wheeze: no    Decreased Appetite: no    Nausea: no    Vomiting: no    Diarrhea:  no    Dysuria/Freq.: no    Fatigue/Achiness: no    Sick/Strep Exposure: no     Therapies Tried and outcome: none    Lately has had watery itchy eyes. Very mild congestion.   Has pain in the left ear, uncomfortable when putting her hearing aid in. She uses Qtip to clean ear canal regularly. Denies injury to the ear. No recent fever cough. No known sick exposure.     ROS:  Constitutional, HEENT, cardiovascular, pulmonary, gi and gu systems are negative, except as otherwise noted.       ARIPiprazole (ABILIFY) 30 MG tablet, Take 1 tablet (30 mg) by mouth daily  ARIPiprazole (ABILIFY) 5 MG tablet, Take 1 tablet (5 mg) by mouth At Bedtime Along with 5mg tab to total 25mg.  Prescribed by Dr. Isael Jim[facility]nez at [facility]  blood glucose (ACCU-CHEK CELIA) test strip, Use to test blood sugars 3 times daily or as directed.  blood glucose monitoring (ACCU-CHEK CELIA PLUS) meter device kit, Use to test blood sugars 4 times daily or as directed.  blood glucose monitoring (ACCU-CHEK FASTCLIX) lancets, Use to test blood sugar 6 times daily or as directed.  buPROPion (WELLBUTRIN XL) 300 MG 24 hr tablet, TAKE 1 TABLET EVERY DAY. (TOTAL DAILY DOSE = 300MG)  busPIRone HCl (BUSPAR) 30 MG tablet, TAKE 1 TABLET BY MOUTH TWICE A DAY  CVS ASPIRIN ADULT LOW DOSE 81 MG chewable tablet, TAKE 1 TABLET BY MOUTH EVERY DAY  [employer]S PAIN RELIEF 500 MG tablet, TAKE 2 TABLETS BY MOUTH EVERY 8 HOURS. MAX  ACETAMINOPHEN DOSE: 4000MG IN 24 HRS.  gabapentin (NEURONTIN) 600 MG tablet, Take 1 tablet (600 mg) by mouth 3 times daily  ibuprofen (ADVIL/MOTRIN) 400 MG tablet, Take 1 tablet (400 mg) by mouth every 8 hours as needed for moderate pain (with food)  insulin aspart (NOVOLOG FLEXPEN) 100 UNIT/ML pen, 15 unit subcutaneous per meal three times a day.  insulin glargine (BASAGLAR KWIKPEN) 100 UNIT/ML pen, Take 55 units daily.  insulin pen needle 31G X 5 MM, Use four times 4 day times a day  metFORMIN (GLUCOPHAGE) 1000 MG tablet, Take 1 tablet (1,000 mg) by mouth 2 times daily (with meals)  Multiple Vitamin (DAILY-ISAAC) TABS, TAKE 1 TABLET BY MOUTH DAILY  nystatin (MYCOSTATIN) 762504 UNIT/GM external powder, Apply 1 dose topically 3 times daily as needed  omeprazole (PRILOSEC) 20 MG DR capsule, TAKE 2 CAPSULES BY MOUTH DAILY TAKE 30-60 MINUTES BEFORE A MEAL.  order for DME, Equipment being ordered: grab bar for bath tub/shower.  order for DME, Equipment being ordered: railing for stairs at home.  ORDER FOR DME, Equipment being ordered:  Ostomy supplies.  Drain Pouch by [employer] #.    Also needs Barrier by [employer] #.  ORDER FOR DME, Equipment being ordered: Cane  PARoxetine (PAXIL) 20 MG tablet, TAKE 2 TABLETS AT BEDTIME (Prescribed by Dr. Isael Jiménez at [facility])  pioglitazone (ACTOS) 45 MG tablet, Take 1 tablet (45 mg) by mouth daily  propranolol ER (INDERAL LA) 60 MG 24 hr capsule, Take 1 capsule (60 mg) by mouth daily  ramipril (ALTACE) 5 MG capsule, Take 1 capsule (5 mg) by mouth daily  salicylic acid (MEDIPLAST) 40 % miscellaneous, Apply to warts nightly as tolerated.  simvastatin (ZOCOR) 20 MG tablet, Take 1 tablet (20 mg) by mouth At Bedtime  traMADol (ULTRAM) 50 MG tablet, TAKE 1/2 TABLET BY MOUTH 3 TIMES A DAY  traZODone (DESYREL) 50 MG tablet, Take 1.5 tablets (75 mg) by mouth At Bedtime  UNIVERSAL REMOVER WIPES EX MISC, uses to remove barrier from colostomy bag at time of  change  vitamin D3 (CHOLECALCIFEROL) 2000 units (50 mcg) tablet, TAKE 1 TABLET BY MOUTH DAILY  albuterol (PROAIR HFA/PROVENTIL HFA/VENTOLIN HFA) 108 (90 Base) MCG/ACT inhaler, Inhale 2 puffs into the lungs every 6 hours (Patient not taking: Reported on 12/10/2019)  buPROPion (WELLBUTRIN SR) 150 MG 12 hr tablet, Take 1 tablet (150 mg) by mouth 2 times daily  [] HYDROcodone-acetaminophen (NORCO) 5-325 MG tablet, Take 1 tablet by mouth nightly as needed for severe pain  order for DME, Equipment being ordered: Knee walker  (Patient not taking: Reported on 2020)  order for DME, by Device route continuous Air walker -  Use as instructed (Patient not taking: Reported on 2020)  PARoxetine (PAXIL) 20 MG tablet, Take 3 tablets (60 mg) by mouth every morning    triamcinolone (KENALOG-40) injection 40 mg  triamcinolone (KENALOG-40) injection 40 mg  triamcinolone (KENALOG-40) injection 40 mg  triamcinolone (KENALOG-40) injection 40 mg  triamcinolone (KENALOG-40) injection 40 mg           Patient Active Problem List   Diagnosis     Hyperlipidemia LDL goal <100     Severe major depression with psychotic features (H)     History of cervical cancer     S/P colostomy (H)     Mixed incontinence urge and stress     Memory loss     Type 2 diabetes mellitus, uncontrolled A1C goal <8%     Obesity     Pain in joint, hand     Trigger finger, acquired     Synovitis and tenosynovitis     Dermatochalasis     Presbyopia     OCD (obsessive compulsive disorder)     HTN, goal below 140/90     Cataracts, both eyes     Health Care Home     Lumbago     Sprain of lumbar region     Generalized osteoarthrosis, unspecified site     Rotator cuff impingement syndrome     Hypoglycemia due to insulin     Encounter for long-term (current) use of insulin (H)     Colostomy, evaluate (H)     Urinary retention     ACP (advance care planning)     Bilateral low back pain without sciatica     Anxiety     Fecal incontinence due to anorectal  disorder     Visual hallucination     Type 2 diabetes mellitus with hyperglycemia (H)     Chronic pain syndrome     Obesity (BMI 35.0-39.9) with comorbidity (H)     Anal stenosis     S/P partial colectomy     Anemia     Chest pain, musculoskeletal     Diabetic neuropathy (H)     GERD (gastroesophageal reflux disease)     Hyponatremia     Schizoaffective disorder (H)     Pseudophakia of right eye     Fecal urgency     Past Surgical History:   Procedure Laterality Date     ARTHROSCOPY KNEE RT/LT      LT     BLEPHAROPLASTY BILATERAL Bilateral 8/16/2019    Procedure: BILATERAL UPPER LID BLEPHAROPLASTY;  Surgeon: Randolph Cook MD;  Location: [facility] OR     BREAST LUMPECTOMY, RT/LT      LT-benign      C EXCIS PTERYGIUM  10/08    [facility]     C STOMACH SURGERY PROCEDURE UNLISTED       CATARACT IOL, RT/LT       COLONOSCOPY  5/10/2012    Procedure:COLONOSCOPY; screening colonoscopy; Surgeon:MILLA VEGA; Location:[facility] OR     COLONOSCOPY WITH CO2 INSUFFLATION N/A 8/2/2019    Procedure: Colonoscopy with CO2 insufflation;  Surgeon: Himanshu Hi MD;  Location: [facility] OR     COLOSTOMY  2000     COLOSTOMY       HC COLONOSCOPY THRU STOMA, DIAGNOSTIC  2002    normal per report-no records available-records destroyed     HYSTERECTOMY, PAP NO LONGER INDICATED      done in [place]-cervical cancer     IMPLANT STIMULATOR SACRAL NERVE STAGE ONE Right 3/10/2015    Procedure: IMPLANT STIMULATOR SACRAL NERVE STAGE ONE;  Surgeon: Teodora Yusuf MD;  [facility]     IMPLANT STIMULATOR SACRAL NERVE STAGE TWO Right 3/31/2015    Procedure: IMPLANT STIMULATOR SACRAL NERVE STAGE TWO;  Surgeon: Teodora Yusuf MD;  [facility]     PHACOEMULSIFICATION WITH STANDARD INTRAOCULAR LENS IMPLANT Left 9/26/2019    Procedure: LEFT PHACOEMULSIFICATION, CATARACT, WITH STANDARD IOL INSERTION;  Surgeon: Francesco Winn MD;  Location: [facility]     PHACOEMULSIFICATION WITH STANDARD INTRAOCULAR LENS IMPLANT Right 10/24/2019     Procedure: RIGHT PHACOEMULSIFICATION, CATARACT, WITH STANDARD IOL INSERTION;  Surgeon: Francesco Winn MD;  Location: [facility] OR     REPAIR PTOSIS Bilateral 08/16/2019     SALPINGO OOPHORECTOMY,R/L/JENNIFER      Salpingo Oophorectomy, RT/LT/JENNIFER       Social History     Tobacco Use     Smoking status: Former Smoker     Packs/day: 0.00     Last attempt to quit: 8/9/2016     Years since quitting: 3.8     Smokeless tobacco: Never Used   Substance Use Topics     Alcohol use: Yes     Alcohol/week: 0.0 standard drinks     Comment: social occasions     Family History   Problem Relation Age of Onset     Diabetes Mother      Cerebrovascular Disease Mother      Diabetes Father      Hypertension Father      Cancer Maternal Grandfather      Cancer Paternal Grandfather      Diabetes Brother      Diabetes Sister      Thyroid Disease Daughter      Thyroid Disease Sister      Multiple Sclerosis Daughter      Glaucoma No family hx of      Macular Degeneration No family hx of              Problem list, Medication list, Allergies, and Medical/Social/Surgical histories reviewed in [facility] and updated as appropriate.    OBJECTIVE:                                                    /60 (BP Location: Right arm, Patient Position: Sitting, Cuff Size: Adult Regular)   Pulse 72   Temp 97.8  F (36.6  C) (Temporal)   Wt 97.8 kg (215 lb 8 oz)   SpO2 100%   Breastfeeding No   BMI 39.42 kg/m      GENERAL: healthy, alert and no distress  HEENT: no conjunctival injection; right ear is normal in the canal and the membrane. Left ear canal showed a small raised flesh color papule on the medial aspect of the ear canal.   Lung: clear, no wheezing/rhonchi/crackles  Heart: RRR, normal S1/2, no murmur/gallop/rup        Diagnostic test results:  No results found for any visits on 06/04/20.      ASSESSMENT/PLAN:                                                      65 year old female with the following diagnoses and treatment plan:      ICD-10-CM     1. Injury of ear canal, initial encounter  S09.91XA    2. Seasonal allergic rhinitis, unspecified trigger  J30.2 cetirizine (ZYRTEC) 10 MG tablet       -- possibly she damaged the ear canal with Q tip forming a papule. Will have her leave hearing aid out for a week, no Q tip in ear canal. Allow this to heal. If it is persistent, will refer to ENT.   -- seasonal alelrgies: add Zyrtec.   -- pt reported she had bladder procedure in late June. Will schedule preop before her procedure.     Will call or return to clinic if worsening or symptoms not improving as discussed.  See Patient Instructions.      Tiffany King MD-PhD  [facility] [place]    (Note: Chart documentation was done in part with Dragon Voice Recognition software. Although reviewed after completion, some word and grammatical errors may remain.)

## 2020-06-04 NOTE — PATIENT INSTRUCTIONS
Make appointment(s) for:   -- schedule preop appointment the morning of 6/18/2020 with fasting labs.      Avoid using Q-tip to clean ear canal.   Leave hearing aid out on the left side for 1 week to see if this get better.         Medication(s) prescribed today:    Orders Placed This Encounter   Medications     cetirizine (ZYRTEC) 10 MG tablet     Sig: Take 1 tablet (10 mg) by mouth daily     Dispense:  30 tablet     Refill:  5

## 2020-06-04 NOTE — TELEPHONE ENCOUNTER
PIOGLITAZONE HCL 45 MG TABLET   Last Written Prescription Date:  5/10/2019  Last Fill Quantity: 90,   # refills: 3  Last Office Visit : 9/20/2019 [facility]  Future Office visit:  6/5/20    Routing refill request to provider for review/approval because:  Abnormal/ overdue labs: LFT last 5/9/2017: ALT=69, AST=67 Care Everywhere

## 2020-06-05 ENCOUNTER — OFFICE VISIT (OUTPATIENT)
Dept: ORTHOPEDICS | Facility: CLINIC | Age: 65
End: 2020-06-05
Payer: MEDICARE

## 2020-06-05 ENCOUNTER — TELEPHONE (OUTPATIENT)
Dept: ORTHOPEDICS | Facility: CLINIC | Age: 65
End: 2020-06-05

## 2020-06-05 ENCOUNTER — VIRTUAL VISIT (OUTPATIENT)
Dept: ENDOCRINOLOGY | Facility: CLINIC | Age: 65
End: 2020-06-05
Payer: MEDICARE

## 2020-06-05 VITALS
SYSTOLIC BLOOD PRESSURE: 126 MMHG | BODY MASS INDEX: 40.12 KG/M2 | WEIGHT: 218 LBS | DIASTOLIC BLOOD PRESSURE: 62 MMHG | HEIGHT: 62 IN

## 2020-06-05 DIAGNOSIS — Z79.4 TYPE 2 DIABETES MELLITUS WITH HYPERGLYCEMIA, WITH LONG-TERM CURRENT USE OF INSULIN (H): Primary | ICD-10-CM

## 2020-06-05 DIAGNOSIS — M54.50 CHRONIC BILATERAL LOW BACK PAIN WITHOUT SCIATICA: Primary | ICD-10-CM

## 2020-06-05 DIAGNOSIS — E11.65 TYPE 2 DIABETES MELLITUS WITH HYPERGLYCEMIA, WITH LONG-TERM CURRENT USE OF INSULIN (H): Primary | ICD-10-CM

## 2020-06-05 DIAGNOSIS — F32.A DEPRESSION, UNSPECIFIED DEPRESSION TYPE: ICD-10-CM

## 2020-06-05 DIAGNOSIS — G89.29 CHRONIC BILATERAL LOW BACK PAIN WITHOUT SCIATICA: Primary | ICD-10-CM

## 2020-06-05 PROCEDURE — 99213 OFFICE O/P EST LOW 20 MIN: CPT | Performed by: FAMILY MEDICINE

## 2020-06-05 PROCEDURE — 99443: CPT | Performed by: INTERNAL MEDICINE

## 2020-06-05 ASSESSMENT — PAIN SCALES - GENERAL: PAINLEVEL: NO PAIN (0)

## 2020-06-05 ASSESSMENT — MIFFLIN-ST. JEOR: SCORE: 1492.09

## 2020-06-05 NOTE — PROGRESS NOTES
"Date     6/5/20 345    6/4/20 412    6/3/20 187 435   6/2/20  207   6/1/20 248    5/31/20 241             Holly Muir is a 65 year old female who is being evaluated via a billable telephone visit.      The patient has been notified of following:     \"This telephone visit will be conducted via a call between you and your physician/provider. We have found that certain health care needs can be provided without the need for a physical exam.  This service lets us provide the care you need with a short phone conversation.  If a prescription is necessary we can send it directly to your pharmacy.  If lab work is needed we can place an order for that and you can then stop by our lab to have the test done at a later time.    Telephone visits are billed at different rates depending on your insurance coverage. During this emergency period, for some insurers they may be billed the same as an in-person visit.  Please reach out to your insurance provider with any questions.    If during the course of the call the physician/provider feels a telephone visit is not appropriate, you will not be charged for this service.\"    Patient has given verbal consent for Telephone visit?  Yes    What phone number would you like to be contacted at? cell    How would you like to obtain your AVS? [facility]    Phone call duration: 22 minutes                                                            - Endocrinology Follow up -    Reason for visit/consult:  Uncontrolled type 2 diabetes mellitus with hyperglycemia, with long-term current use of insulin (H)    Primary care provider: Tiffany King      Assessment and Plan    # DM2:   A1c 7.9 on 12/2019, Fluctuating but she mentioned lots fast foods during lock down.     - Continue current basaglar 65 units daily  - Continue current Novolog     Novolog 15-15-15 (each meal, depends on frequency of meal )      -Continue current metformin 1000 mg bid  - Continue current Actos 45 mg, body weight stable.   - " Make sure to avoid frequent fast foods    - A1c,  - urine microalbumin    # Night time eating habits      # Mental health  Depression was worse in spring 2019. Currently better..   I also encouraged exercise for her.     Her doctor:Dr. Prachi Tenorio at [facility].    # DM complication  Urine microalbumin due (ordered)    -RTC with me in 6 months with Mikayla Medrano MD  Staff Physician  Endocrinology and Metabolism  License: [id number]      Current Diabetic Regimens:  Basaglar 55 units daily  NovoLog 15 units each meal  Metformin 1000 twice daily  Actos 45 mg daily    Life Style:  Wake up 10-11 am  Breakfast after  Lunch 1-2 pm: sandwich  Dinner 6 pm: mexican fat foods  Bedtime: 9 pm  3 am : wake up and snacks    Exercise:  walking    DM complications:  Retinopathy: due not been for 1-2 years.   Nephropathy: urine microalbumin negative 8/2018 - due  Neuropathy:   Most recent LDL:   LDL Cholesterol Calculated   Date Value Ref Range Status   12/14/2018 25 <100 mg/dL Final     Comment:     Desirable:       <100 mg/dl       Glucose Log:   Documented above, assessed and explained.     Interval History as of 6/5/2020 : Fluctuating glucose, lots fast foods nowadays. Medication compliance good .  Interval History as of 9/20/2019 : Follow-up in 4 months, compliant medications.  However she still have nighttime snacking habit every night, when she eat carbohydrate snacks glucose in the morning was 250s.  Light fruits 160-180s.  Patient mentioned depression was worse spring 2019 but currently relatively okay and she has psychiatrist to follow-up.   Interval History as of 5/2019: Patient came by herself she missed appointment, increasing the dose of insulin, mentioned started to have a glucose increase 300 in the fasting in the morning her mental issue.  Seen by new psychiatrist. Visitign nurse once a week.    HPI: A 61 yo female second follow up DM2, she has remote history of colectomy. This is the third  visit.   Used to use V go for several month, she was confused the system, worsened A1C, then we switch back to insulin injection regimens.    Currently lantus 38 utnis and novology 6-8-8 and sliding scale and metformin 1000 bid.   Last visit prscribed actos 30 mg daily, however she mentioned, she is not sure which one is actos and she may not taking actos.      Of note,  she has had sleeping issues, which distracts her life significantly. She cannot sleep at night, usually sleeps in the day, during the night, she has been hungry and eating peanuts butters, jelly, fruits etc.   Sometimes she cannot sleep for 2 days.   She has visiting nurse once a week, yesterday the nurse called our clinic that her glucose was 500. Talked with Mikayla, lantus was increased from 38 to 42 units.     Glucose: she forgot to bring glucometer today.     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     Past Surgical History:   Procedure Laterality Date     ARTHROSCOPY KNEE RT/LT      LT     BLEPHAROPLASTY BILATERAL Bilateral 8/16/2019    Procedure: BILATERAL UPPER LID BLEPHAROPLASTY;  Surgeon: Randolph Cook MD;  Location:  OR     BREAST LUMPECTOMY, RT/LT      LT-benign      C EXCIS PTERYGIUM  10/08    [facility]     C STOMACH SURGERY PROCEDURE UNLISTED       CATARACT IOL, RT/LT       COLONOSCOPY  5/10/2012    Procedure:COLONOSCOPY; screening colonoscopy; Surgeon:MILLA VEGA; Location: OR     COLONOSCOPY WITH CO2 INSUFFLATION N/A 8/2/2019    Procedure: Colonoscopy with CO2 insufflation;  Surgeon: Himanshu Hi MD;   Location: [facility] OR     COLOSTOMY  2000     COLOSTOMY       HC COLONOSCOPY THRU STOMA, DIAGNOSTIC  2002    normal per report-no records available-records destroyed     HYSTERECTOMY, PAP NO LONGER INDICATED      done in [place]-cervical cancer     IMPLANT STIMULATOR SACRAL NERVE STAGE ONE Right 3/10/2015    Procedure: IMPLANT STIMULATOR SACRAL NERVE STAGE ONE;  Surgeon: Teodora Yusuf MD;  [facility]     IMPLANT STIMULATOR SACRAL NERVE STAGE TWO Right 3/31/2015    Procedure: IMPLANT STIMULATOR SACRAL NERVE STAGE TWO;  Surgeon: Teodora Yusuf MD;  [facility]     PHACOEMULSIFICATION WITH STANDARD INTRAOCULAR LENS IMPLANT Left 9/26/2019    Procedure: LEFT PHACOEMULSIFICATION, CATARACT, WITH STANDARD IOL INSERTION;  Surgeon: Francesco Winn MD;  Location: [facility]     PHACOEMULSIFICATION WITH STANDARD INTRAOCULAR LENS IMPLANT Right 10/24/2019    Procedure: RIGHT PHACOEMULSIFICATION, CATARACT, WITH STANDARD IOL INSERTION;  Surgeon: Francesco Winn MD;  Location: [facility] OR     REPAIR PTOSIS Bilateral 08/16/2019     SALPINGO OOPHORECTOMY,R/L/JENNIFER      Salpingo Oophorectomy, RT/LT/JENNIFER       Allergies:  Allergies   Allergen Reactions     Morphine Sulfate Itching       Current Medications   Current Outpatient Medications   Medication     ARIPiprazole (ABILIFY) 30 MG tablet     ARIPiprazole (ABILIFY) 5 MG tablet     blood glucose (ACCU-CHEK CELIA) test strip     blood glucose monitoring (ACCU-CHEK CELIA PLUS) meter device kit     blood glucose monitoring (ACCU-CHEK FASTCLIX) lancets     buPROPion (WELLBUTRIN SR) 150 MG 12 hr tablet     buPROPion (WELLBUTRIN XL) 300 MG 24 hr tablet     busPIRone HCl (BUSPAR) 30 MG tablet     cetirizine (ZYRTEC) 10 MG tablet     CVS ASPIRIN ADULT LOW DOSE 81 MG chewable tablet     [facility]S PAIN RELIEF 500 MG tablet     gabapentin (NEURONTIN) 600 MG tablet     ibuprofen (ADVIL/MOTRIN) 400 MG tablet     insulin aspart (NOVOLOG FLEXPEN) 100 UNIT/ML pen     insulin glargine  (BASAGLAR KWIKPEN) 100 UNIT/ML pen     insulin pen needle 31G X 5 MM     metFORMIN (GLUCOPHAGE) 1000 MG tablet     Multiple Vitamin (DAILY-ISAAC) TABS     nystatin (MYCOSTATIN) 838706 UNIT/GM external powder     omeprazole (PRILOSEC) 20 MG DR capsule     pioglitazone (ACTOS) 45 MG tablet     propranolol ER (INDERAL LA) 60 MG 24 hr capsule     ramipril (ALTACE) 5 MG capsule     simvastatin (ZOCOR) 20 MG tablet     traMADol (ULTRAM) 50 MG tablet     traZODone (DESYREL) 50 MG tablet     UNIVERSAL REMOVER WIPES EX MISC     vitamin D3 (CHOLECALCIFEROL) 2000 units (50 mcg) tablet     albuterol (PROAIR HFA/PROVENTIL HFA/VENTOLIN HFA) 108 (90 Base) MCG/ACT inhaler     order for DME     order for DME     order for DME     order for DME     ORDER FOR DME     ORDER FOR DME     PARoxetine (PAXIL) 20 MG tablet     salicylic acid (MEDIPLAST) 40 % miscellaneous     Current Facility-Administered Medications   Medication     triamcinolone (KENALOG-40) injection 40 mg     triamcinolone (KENALOG-40) injection 40 mg     triamcinolone (KENALOG-40) injection 40 mg     triamcinolone (KENALOG-40) injection 40 mg     triamcinolone (KENALOG-40) injection 40 mg       Family History:  Family History   Problem Relation Age of Onset     Diabetes Mother      Cerebrovascular Disease Mother      Diabetes Father      Hypertension Father      Cancer Maternal Grandfather      Cancer Paternal Grandfather      Diabetes Brother      Diabetes Sister      Thyroid Disease Daughter      Thyroid Disease Sister      Multiple Sclerosis Daughter      Glaucoma No family hx of      Macular Degeneration No family hx of        Social History:  Social History     Tobacco Use     Smoking status: Former Smoker     Packs/day: 0.00     Last attempt to quit: 8/9/2016     Years since quitting: 3.8     Smokeless tobacco: Never Used   Substance Use Topics     Alcohol use: Yes     Alcohol/week: 0.0 standard drinks     Comment: social occasions   taking care of 11 and 18 year  grand children and [facility].     ROS:  Full review of systems taken with the help of the intake sheet. Otherwise a complete 14 point review of systems was taken and is negative unless stated in the history above.    Physical Exam:   not currently breastfeeding.  General: well appearing, no acute distress, pleasant and conversant,   Mental Status/neuro: alert and oriented  Face: symmetrical, normal facial color  Eyes: anicteric, no proptosis or lid lag  Resp: no acute distress        Labs (General):

## 2020-06-05 NOTE — LETTER
"    6/5/2020         RE: Holly Muir  [address] [address]        Dear Colleague,    Thank you for referring your patient, Holly Muir, to the [facility]. Please see a copy of my visit note below.    Date     6/5/20 345    6/4/20 412    6/3/20 187 435   6/2/20  207   6/1/20 248    5/31/20 241             Holly Muir is a 65 year old female who is being evaluated via a billable telephone visit.      The patient has been notified of following:     \"This telephone visit will be conducted via a call between you and your physician/provider. We have found that certain health care needs can be provided without the need for a physical exam.  This service lets us provide the care you need with a short phone conversation.  If a prescription is necessary we can send it directly to your pharmacy.  If lab work is needed we can place an order for that and you can then stop by our lab to have the test done at a later time.    Telephone visits are billed at different rates depending on your insurance coverage. During this emergency period, for some insurers they may be billed the same as an in-person visit.  Please reach out to your insurance provider with any questions.    If during the course of the call the physician/provider feels a telephone visit is not appropriate, you will not be charged for this service.\"    Patient has given verbal consent for Telephone visit?  Yes    What phone number would you like to be contacted at? cell    How would you like to obtain your AVS? Dinesh    Phone call duration: 22 minutes                                                            - Endocrinology Follow up -    Reason for visit/consult:  Uncontrolled type 2 diabetes mellitus with hyperglycemia, with long-term current use of insulin (H)    Primary care provider: Tiffany King      Assessment and Plan    # DM2:   A1c 7.9 on 12/2019, Fluctuating but she mentioned lots fast foods " during lock down.     - Continue current basaglar 65 units daily  - Continue current Novolog     Novolog 15-15-15 (each meal, depends on frequency of meal )      -Continue current metformin 1000 mg bid  - Continue current Actos 45 mg, body weight stable.   - Make sure to avoid frequent fast foods    - A1c,  - urine microalbumin    # Night time eating habits      # Mental health  Depression was worse in spring 2019. Currently better..   I also encouraged exercise for her.     Her doctor:Dr. Prachi Ten[facility].    # DM complication  Urine microalbumin due (ordered)    -RTC with me in 6 months with Mikayla Medrano MD  Staff Physician  Endocrinology and Metabolism  License: [id number]      Current Diabetic Regimens:  Basaglar 55 units daily  NovoLog 15 units each meal  Metformin 1000 twice daily  Actos 45 mg daily    Life Style:  Wake up 10-11 am  Breakfast after  Lunch 1-2 pm: sandwich  Dinner 6 pm: mexican fat foods  Bedtime: 9 pm  3 am : wake up and snacks    Exercise:  walking    DM complications:  Retinopathy: due not been for 1-2 years.   Nephropathy: urine microalbumin negative 8/2018 - due  Neuropathy:   Most recent LDL:   LDL Cholesterol Calculated   Date Value Ref Range Status   12/14/2018 25 <100 mg/dL Final     Comment:     Desirable:       <100 mg/dl       Glucose Log:   Documented above, assessed and explained.     Interval History as of 6/5/2020 : Fluctuating glucose, lots fast foods nowadays. Medication compliance good .  Interval History as of 9/20/2019 : Follow-up in 4 months, compliant medications.  However she still have nighttime snacking habit every night, when she eat carbohydrate snacks glucose in the morning was 250s.  Light fruits 160-180s.  Patient mentioned depression was worse spring 2019 but currently relatively okay and she has psychiatrist to follow-up.   Interval History as of 5/2019: Patient came by herself she missed appointment, increasing the dose of  insulin, mentioned started to have a glucose increase 300 in the fasting in the morning her mental issue.  Seen by new psychiatrist. Visitign nurse once a week.    HPI: A 61 yo female second follow up DM2, she has remote history of colectomy. This is the third visit.   Used to use V go for several month, she was confused the system, worsened A1C, then we switch back to insulin injection regimens.    Currently lantus 38 utnis and novology 6-8-8 and sliding scale and metformin 1000 bid.   Last visit prscribed actos 30 mg daily, however she mentioned, she is not sure which one is actos and she may not taking actos.      Of note,  she has had sleeping issues, which distracts her life significantly. She cannot sleep at night, usually sleeps in the day, during the night, she has been hungry and eating peanuts butters, jelly, fruits etc.   Sometimes she cannot sleep for 2 days.   She has visiting nurse once a week, yesterday the nurse called our clinic that her glucose was 500. Talked with Mikayla, lantus was increased from 38 to 42 units.     Glucose: she forgot to bring glucometer today.     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     Past Surgical History:   Procedure Laterality Date     ARTHROSCOPY KNEE RT/LT      LT     BLEPHAROPLASTY BILATERAL Bilateral 8/16/2019    Procedure: BILATERAL UPPER LID BLEPHAROPLASTY;  Surgeon: Randolph Cook MD;  Location: [facility] OR     BREAST LUMPECTOMY, RT/LT      LT-benign      C EXCIS PTERYGIUM  10/08    [facility]     C STOMACH SURGERY PROCEDURE UNLISTED       CATARACT IOL, RT/LT        COLONOSCOPY  5/10/2012    Procedure:COLONOSCOPY; screening colonoscopy; Surgeon:MILLA VEGA; Location:[facility] OR     COLONOSCOPY WITH CO2 INSUFFLATION N/A 8/2/2019    Procedure: Colonoscopy with CO2 insufflation;  Surgeon: Himanshu Hi MD;  Location: [facility] OR     COLOSTOMY  2000     COLOSTOMY       [facility] COLONOSCOPY THRU STOMA, DIAGNOSTIC  2002    normal per report-no records available-records destroyed     HYSTERECTOMY, PAP NO LONGER INDICATED      done in [place]-cervical cancer     IMPLANT STIMULATOR SACRAL NERVE STAGE ONE Right 3/10/2015    Procedure: IMPLANT STIMULATOR SACRAL NERVE STAGE ONE;  Surgeon: Teodora Yusuf MD;  Location: [facility]     IMPLANT STIMULATOR SACRAL NERVE STAGE TWO Right 3/31/2015    Procedure: IMPLANT STIMULATOR SACRAL NERVE STAGE TWO;  Surgeon: Teodora Yusuf MD;  [facility]: [facility] OR     PHACOEMULSIFICATION WITH STANDARD INTRAOCULAR LENS IMPLANT Left 9/26/2019    Procedure: LEFT PHACOEMULSIFICATION, CATARACT, WITH STANDARD IOL INSERTION;  Surgeon: Francesco iWnn MD;  Location: [facility] OR     PHACOEMULSIFICATION WITH STANDARD INTRAOCULAR LENS IMPLANT Right 10/24/2019    Procedure: RIGHT PHACOEMULSIFICATION, CATARACT, WITH STANDARD IOL INSERTION;  Surgeon: Francesco Winn MD;  Location: [facility] OR     REPAIR PTOSIS Bilateral 08/16/2019     SALPINGO OOPHORECTOMY,R/L/JENNIFER      Salpingo Oophorectomy, RT/LT/JENNIFER       Allergies:  Allergies   Allergen Reactions     Morphine Sulfate Itching       Current Medications   Current Outpatient Medications   Medication     ARIPiprazole (ABILIFY) 30 MG tablet     ARIPiprazole (ABILIFY) 5 MG tablet     blood glucose (ACCU-CHEK CELIA) test strip     blood glucose monitoring (ACCU-CHEK CELIA PLUS) meter device kit     blood glucose monitoring (ACCU-CHEK FASTCLIX) lancets     buPROPion (WELLBUTRIN SR) 150 MG 12 hr tablet     buPROPion (WELLBUTRIN XL) 300 MG 24 hr tablet     busPIRone HCl (BUSPAR) 30 MG tablet     cetirizine (ZYRTEC) 10  MG tablet     CVS ASPIRIN ADULT LOW DOSE 81 MG chewable tablet     CVS PAIN RELIEF 500 MG tablet     gabapentin (NEURONTIN) 600 MG tablet     ibuprofen (ADVIL/MOTRIN) 400 MG tablet     insulin aspart (NOVOLOG FLEXPEN) 100 UNIT/ML pen     insulin glargine (BASAGLAR KWIKPEN) 100 UNIT/ML pen     insulin pen needle 31G X 5 MM     metFORMIN (GLUCOPHAGE) 1000 MG tablet     Multiple Vitamin (DAILY-ISAAC) TABS     nystatin (MYCOSTATIN) 879105 UNIT/GM external powder     omeprazole (PRILOSEC) 20 MG DR capsule     pioglitazone (ACTOS) 45 MG tablet     propranolol ER (INDERAL LA) 60 MG 24 hr capsule     ramipril (ALTACE) 5 MG capsule     simvastatin (ZOCOR) 20 MG tablet     traMADol (ULTRAM) 50 MG tablet     traZODone (DESYREL) 50 MG tablet     UNIVERSAL REMOVER WIPES EX MISC     vitamin D3 (CHOLECALCIFEROL) 2000 units (50 mcg) tablet     albuterol (PROAIR HFA/PROVENTIL HFA/VENTOLIN HFA) 108 (90 Base) MCG/ACT inhaler     order for DME     order for DME     order for DME     order for DME     ORDER FOR DME     ORDER FOR DME     PARoxetine (PAXIL) 20 MG tablet     salicylic acid (MEDIPLAST) 40 % miscellaneous     Current Facility-Administered Medications   Medication     triamcinolone (KENALOG-40) injection 40 mg     triamcinolone (KENALOG-40) injection 40 mg     triamcinolone (KENALOG-40) injection 40 mg     triamcinolone (KENALOG-40) injection 40 mg     triamcinolone (KENALOG-40) injection 40 mg       Family History:  Family History   Problem Relation Age of Onset     Diabetes Mother      Cerebrovascular Disease Mother      Diabetes Father      Hypertension Father      Cancer Maternal Grandfather      Cancer Paternal Grandfather      Diabetes Brother      Diabetes Sister      Thyroid Disease Daughter      Thyroid Disease Sister      Multiple Sclerosis Daughter      Glaucoma No family hx of      Macular Degeneration No family hx of        Social History:  Social History     Tobacco Use     Smoking status: Former Smoker      Packs/day: 0.00     Last attempt to quit: 8/9/2016     Years since quitting: 3.8     Smokeless tobacco: Never Used   Substance Use Topics     Alcohol use: Yes     Alcohol/week: 0.0 standard drinks     Comment: social occasions   taking care of 11 and 18 year grand children and live togheter.     ROS:  Full review of systems taken with the help of the intake sheet. Otherwise a complete 14 point review of systems was taken and is negative unless stated in the history above.    Physical Exam:   not currently breastfeeding.  General: well appearing, no acute distress, pleasant and conversant,   Mental Status/neuro: alert and oriented  Face: symmetrical, normal facial color  Eyes: anicteric, no proptosis or lid lag  Resp: no acute distress        Labs (General):                 Again, thank you for allowing me to participate in the care of your patient.        Sincerely,        Elizabeth Medrano MD

## 2020-06-05 NOTE — TELEPHONE ENCOUNTER
6/5 Provided phone number [phone] to schedule mri.     Swathi Degroot   Procedure    Ortho/Sports Med/Ent/Eye   [facility]   [phone]

## 2020-06-05 NOTE — LETTER
6/5/2020         RE: Holly Muir  [address] [address]        Dear Colleague,    Thank you for referring your patient, Holly Muir, to the [facility]. Please see a copy of my visit note below.    CHIEF COMPLAINT:  RECHECK (right low back pain, no known injury, no pain down legs )       HISTORY OF PRESENT ILLNESS  Ms. Muir is a pleasant 65 year old female who presents to clinic today with back pain.  Holly has been experiencing back pain for years, definitely worse recently with no clear inciting event.  She points to the right side of her low back, pain is present most of the time to some degree but is definitely worse with doing dishes and other activities that require her to stand for long periods of time.  She does not feel any symptoms that radiate down her leg, she denies numbness or tingling.  She has seen physical therapy for this in the past, this helps her intermittently, she also has tried oral medications.  Of note, she does have a sacral stimulator for incontinence, this is near the site of her pain but she is unsure if these are related.        Additional history: as documented    MEDICAL HISTORY  Patient Active Problem List   Diagnosis     Hyperlipidemia LDL goal <100     Severe major depression with psychotic features (H)     History of cervical cancer     S/P colostomy (H)     Mixed incontinence urge and stress     Memory loss     Type 2 diabetes mellitus, uncontrolled A1C goal <8%     Obesity     Pain in joint, hand     Trigger finger, acquired     Synovitis and tenosynovitis     Dermatochalasis     Presbyopia     OCD (obsessive compulsive disorder)     HTN, goal below 140/90     Cataracts, both eyes     Health Care Home     Lumbago     Sprain of lumbar region     Generalized osteoarthrosis, unspecified site     Rotator cuff impingement syndrome     Hypoglycemia due to insulin     Encounter for long-term (current) use of insulin (H)      Colostomy, evaluate (H)     Urinary retention     ACP (advance care planning)     Bilateral low back pain without sciatica     Anxiety     Fecal incontinence due to anorectal disorder     Visual hallucination     Type 2 diabetes mellitus with hyperglycemia (H)     Chronic pain syndrome     Obesity (BMI 35.0-39.9) with comorbidity (H)     Anal stenosis     S/P partial colectomy     Anemia     Chest pain, musculoskeletal     Diabetic neuropathy (H)     GERD (gastroesophageal reflux disease)     Hyponatremia     Schizoaffective disorder (H)     Pseudophakia of right eye     Fecal urgency       Current Outpatient Medications   Medication Sig Dispense Refill     albuterol (PROAIR HFA/PROVENTIL HFA/VENTOLIN HFA) 108 (90 Base) MCG/ACT inhaler Inhale 2 puffs into the lungs every 6 hours (Patient not taking: Reported on 12/10/2019) 1 Inhaler 0     ARIPiprazole (ABILIFY) 30 MG tablet Take 1 tablet (30 mg) by mouth daily       ARIPiprazole (ABILIFY) 5 MG tablet Take 1 tablet (5 mg) by mouth At Bedtime Along with 5mg tab to total 25mg.  Prescribed by Dr. Isael Jiménez at [facility] 30 tablet 1     blood glucose (ACCU-CHEK CELIA) test strip Use to test blood sugars 3 times daily or as directed. 300 each 3     blood glucose monitoring (ACCU-CHEK CELIA PLUS) meter device kit Use to test blood sugars 4 times daily or as directed. 1 kit 0     blood glucose monitoring (ACCU-CHEK FASTCLIX) lancets Use to test blood sugar 6 times daily or as directed. 510 each 1     buPROPion (WELLBUTRIN SR) 150 MG 12 hr tablet Take 1 tablet (150 mg) by mouth 2 times daily       buPROPion (WELLBUTRIN XL) 300 MG 24 hr tablet TAKE 1 TABLET EVERY DAY. (TOTAL DAILY DOSE = 300MG)  1     busPIRone HCl (BUSPAR) 30 MG tablet TAKE 1 TABLET BY MOUTH TWICE A DAY 60 tablet 0     cetirizine (ZYRTEC) 10 MG tablet Take 1 tablet (10 mg) by mouth daily 30 tablet 5     CVS ASPIRIN ADULT LOW DOSE 81 MG chewable tablet TAKE 1 TABLET BY MOUTH EVERY DAY 90 tablet  3     CVS PAIN RELIEF 500 MG tablet TAKE 2 TABLETS BY MOUTH EVERY 8 HOURS. MAX ACETAMINOPHEN DOSE: 4000MG IN 24 HRS. 200 tablet 11     gabapentin (NEURONTIN) 600 MG tablet Take 1 tablet (600 mg) by mouth 3 times daily 90 tablet 11     ibuprofen (ADVIL/MOTRIN) 400 MG tablet Take 1 tablet (400 mg) by mouth every 8 hours as needed for moderate pain (with food)       insulin aspart (NOVOLOG FLEXPEN) 100 UNIT/ML pen 15 unit subcutaneous per meal three times a day. 30 mL 5     insulin glargine (BASAGLAR KWIKPEN) 100 UNIT/ML pen Take 55 units daily. 60 mL 5     insulin pen needle 31G X 5 MM Use four times 4 day times a day 400 each 1     metFORMIN (GLUCOPHAGE) 1000 MG tablet Take 1 tablet (1,000 mg) by mouth 2 times daily (with meals) 180 tablet 3     Multiple Vitamin (DAILY-ISAAC) TABS TAKE 1 TABLET BY MOUTH DAILY 90 tablet 3     nystatin (MYCOSTATIN) 934861 UNIT/GM external powder Apply 1 dose topically 3 times daily as needed 60 g 3     omeprazole (PRILOSEC) 20 MG DR capsule TAKE 2 CAPSULES BY MOUTH DAILY TAKE 30-60 MINUTES BEFORE A MEAL. 180 capsule 3     order for DME Equipment being ordered: grab bar for bath tub/shower. 1 Units 0     order for DME Equipment being ordered: railing for stairs at home. 1 Units 0     order for DME Equipment being ordered: Knee walker  (Patient not taking: Reported on 6/4/2020) 1 Device 0     order for DME by Device route continuous Air walker -  Use as instructed (Patient not taking: Reported on 6/4/2020) 1 Device 0     ORDER FOR DME Equipment being ordered:  Ostomy supplies.  Drain Pouch by Valarie #.    Also needs Barrier by Valarie #. 3 Month 4     ORDER FOR DME Equipment being ordered: Cane 1 Units 0     PARoxetine (PAXIL) 20 MG tablet Take 3 tablets (60 mg) by mouth every morning       PARoxetine (PAXIL) 20 MG tablet TAKE 2 TABLETS AT BEDTIME (Prescribed by Dr. Isael Jiménez at [facility]) 30 tablet      pioglitazone (ACTOS) 45 MG tablet Take 1 tablet  "(45 mg) by mouth daily 90 tablet 3     propranolol ER (INDERAL LA) 60 MG 24 hr capsule Take 1 capsule (60 mg) by mouth daily 30 capsule 11     ramipril (ALTACE) 5 MG capsule Take 1 capsule (5 mg) by mouth daily 30 capsule 10     salicylic acid (MEDIPLAST) 40 % miscellaneous Apply to warts nightly as tolerated. 25 each 11     simvastatin (ZOCOR) 20 MG tablet Take 1 tablet (20 mg) by mouth At Bedtime 30 tablet 11     traMADol (ULTRAM) 50 MG tablet TAKE 1/2 TABLET BY MOUTH 3 TIMES A  tablet 0     traZODone (DESYREL) 50 MG tablet Take 1.5 tablets (75 mg) by mouth At Bedtime       UNIVERSAL REMOVER WIPES EX MISC uses to remove barrier from colostomy bag at time of change 1 Each 3     vitamin D3 (CHOLECALCIFEROL) 2000 units (50 mcg) tablet TAKE 1 TABLET BY MOUTH DAILY 100 tablet 3       Allergies   Allergen Reactions     Morphine Sulfate Itching       Family History   Problem Relation Age of Onset     Diabetes Mother      Cerebrovascular Disease Mother      Diabetes Father      Hypertension Father      Cancer Maternal Grandfather      Cancer Paternal Grandfather      Diabetes Brother      Diabetes Sister      Thyroid Disease Daughter      Thyroid Disease Sister      Multiple Sclerosis Daughter      Glaucoma No family hx of      Macular Degeneration No family hx of        Additional medical/Social/Surgical histories reviewed in [facility] and updated as appropriate.        PHYSICAL EXAM    Vitals:    06/05/20 1117   BP: 126/62   Weight: 98.9 kg (218 lb)   Height: 1.575 m (5' 2\")     General  - normal appearance, in no obvious distress  HEENT  - conjunctivae not injected, moist mucous membranes  CV  - normal peripheral perfusion  Pulm  - normal respiratory pattern, non-labored  Musculoskeletal - lumbar spine  - inspection: normal bone and joint alignment, no obvious scoliosis  - palpation: bilateral lumbar paravertebral tenderness  - ROM: no pain with bilateral rotation, flexion past 30 deg, extension  - strength: lower " "extremities 5/5 in all planes  - special tests:  (-) straight leg raise bilaterally  (-) slump test  Neuro  - patellar and Achilles DTRs 2+ bilaterally, no sensory or motor deficit, grossly normal coordination, normal muscle tone  Skin  - no ecchymosis, erythema, warmth, or induration, no obvious rash  Psych  - interactive, appropriate, normal mood and affect           ASSESSMENT & PLAN  Ms. Muir is a 65 year old female who presents to clinic today with back pain.    I reviewed a prior xray in the room with her, this is from 2017 and does show multilevel degenerative changes with a spondylolisthesis of L5 on S1 that appears to be grade 1.    We had a good discussion centering around different types of back pain and management options.  Given her failure of treatment with conservative therapy thus far I do think it would be reasonable to order an MRI with plans to facilitate an injection, if amenable.    I will get in touch with Holly with her MRI results.    It was a pleasure seeing Holly today.    Santy Harrington DO, CAM  Primary Care Sports Medicine      This note was constructed using Dragon dictation software, please excuse any minor errors in spelling, grammar, or syntax.        [facility] FOLLOW-UP VISIT 6/5/2020    Holly Muir's chief complaint for this visit includes:  Chief Complaint   Patient presents with     RECHECK     right low back pain, no known injury, no pain down legs      PCP: Tiffany King    Referring Provider:  No referring provider defined for this encounter.    /62   Ht 1.575 m (5' 2\")   Wt 98.9 kg (218 lb)   BMI 39.87 kg/m    No Pain (0)       Interval History:     Follow up reason: low back pain right side     Following Therapeutic Plan: Yes     Pain: Worsening    Function: Worsening    Medical History:    Any recent changes to your medical history? No    Any new medication prescribed since last visit? No    Review of Systems:    Do you have fever, chills, " weight loss? No    Do you have any vision problems? No    Do you have any chest pain or edema? No    Do you have any shortness of breath or wheezing?  No    Do you have stomach problems? No    Do you have any urinary track issues? No    Do you have any numbness or focal weakness? No    Do you have diabetes? Yes,     Do you have problems with bleeding or clotting? No    Do you have an rashes or other skin lesions? No      Again, thank you for allowing me to participate in the care of your patient.        Sincerely,        Santy Harrnigton, DO

## 2020-06-05 NOTE — PROGRESS NOTES
CHIEF COMPLAINT:  RECHECK (right low back pain, no known injury, no pain down legs )       HISTORY OF PRESENT ILLNESS  Ms. Muir is a pleasant 65 year old female who presents to clinic today with back pain.  Holly has been experiencing back pain for years, definitely worse recently with no clear inciting event.  She points to the right side of her low back, pain is present most of the time to some degree but is definitely worse with doing dishes and other activities that require her to stand for long periods of time.  She does not feel any symptoms that radiate down her leg, she denies numbness or tingling.  She has seen physical therapy for this in the past, this helps her intermittently, she also has tried oral medications.  Of note, she does have a sacral stimulator for incontinence, this is near the site of her pain but she is unsure if these are related.        Additional history: as documented    MEDICAL HISTORY  Patient Active Problem List   Diagnosis     Hyperlipidemia LDL goal <100     Severe major depression with psychotic features (H)     History of cervical cancer     S/P colostomy (H)     Mixed incontinence urge and stress     Memory loss     Type 2 diabetes mellitus, uncontrolled A1C goal <8%     Obesity     Pain in joint, hand     Trigger finger, acquired     Synovitis and tenosynovitis     Dermatochalasis     Presbyopia     OCD (obsessive compulsive disorder)     HTN, goal below 140/90     Cataracts, both eyes     Health Care Home     Lumbago     Sprain of lumbar region     Generalized osteoarthrosis, unspecified site     Rotator cuff impingement syndrome     Hypoglycemia due to insulin     Encounter for long-term (current) use of insulin (H)     Colostomy, evaluate (H)     Urinary retention     ACP (advance care planning)     Bilateral low back pain without sciatica     Anxiety     Fecal incontinence due to anorectal disorder     Visual hallucination     Type 2 diabetes mellitus with hyperglycemia  (H)     Chronic pain syndrome     Obesity (BMI 35.0-39.9) with comorbidity (H)     Anal stenosis     S/P partial colectomy     Anemia     Chest pain, musculoskeletal     Diabetic neuropathy (H)     GERD (gastroesophageal reflux disease)     Hyponatremia     Schizoaffective disorder (H)     Pseudophakia of right eye     Fecal urgency       Current Outpatient Medications   Medication Sig Dispense Refill     albuterol (PROAIR HFA/PROVENTIL HFA/VENTOLIN HFA) 108 (90 Base) MCG/ACT inhaler Inhale 2 puffs into the lungs every 6 hours (Patient not taking: Reported on 12/10/2019) 1 Inhaler 0     ARIPiprazole (ABILIFY) 30 MG tablet Take 1 tablet (30 mg) by mouth daily       ARIPiprazole (ABILIFY) 5 MG tablet Take 1 tablet (5 mg) by mouth At Bedtime Along with 5mg tab to total 25mg.  Prescribed by Dr. Isael Jiménez at [facility] 30 tablet 1     blood glucose (ACCU-CHEK CELIA) test strip Use to test blood sugars 3 times daily or as directed. 300 each 3     blood glucose monitoring (ACCU-CHEK CELIA PLUS) meter device kit Use to test blood sugars 4 times daily or as directed. 1 kit 0     blood glucose monitoring (ACCU-CHEK FASTCLIX) lancets Use to test blood sugar 6 times daily or as directed. 510 each 1     buPROPion (WELLBUTRIN SR) 150 MG 12 hr tablet Take 1 tablet (150 mg) by mouth 2 times daily       buPROPion (WELLBUTRIN XL) 300 MG 24 hr tablet TAKE 1 TABLET EVERY DAY. (TOTAL DAILY DOSE = 300MG)  1     busPIRone HCl (BUSPAR) 30 MG tablet TAKE 1 TABLET BY MOUTH TWICE A DAY 60 tablet 0     cetirizine (ZYRTEC) 10 MG tablet Take 1 tablet (10 mg) by mouth daily 30 tablet 5     [facility] ASPIRIN ADULT LOW DOSE 81 MG chewable tablet TAKE 1 TABLET BY MOUTH EVERY DAY 90 tablet 3     [facility] PAIN RELIEF 500 MG tablet TAKE 2 TABLETS BY MOUTH EVERY 8 HOURS. MAX ACETAMINOPHEN DOSE: 4000MG IN 24 HRS. 200 tablet 11     gabapentin (NEURONTIN) 600 MG tablet Take 1 tablet (600 mg) by mouth 3 times daily 90 tablet 11     ibuprofen  (ADVIL/MOTRIN) 400 MG tablet Take 1 tablet (400 mg) by mouth every 8 hours as needed for moderate pain (with food)       insulin aspart (NOVOLOG FLEXPEN) 100 UNIT/ML pen 15 unit subcutaneous per meal three times a day. 30 mL 5     insulin glargine (BASAGLAR KWIKPEN) 100 UNIT/ML pen Take 55 units daily. 60 mL 5     insulin pen needle 31G X 5 MM Use four times 4 day times a day 400 each 1     metFORMIN (GLUCOPHAGE) 1000 MG tablet Take 1 tablet (1,000 mg) by mouth 2 times daily (with meals) 180 tablet 3     Multiple Vitamin (DAILY-ISAAC) TABS TAKE 1 TABLET BY MOUTH DAILY 90 tablet 3     nystatin (MYCOSTATIN) 429525 UNIT/GM external powder Apply 1 dose topically 3 times daily as needed 60 g 3     omeprazole (PRILOSEC) 20 MG DR capsule TAKE 2 CAPSULES BY MOUTH DAILY TAKE 30-60 MINUTES BEFORE A MEAL. 180 capsule 3     order for DME Equipment being ordered: grab bar for bath tub/shower. 1 Units 0     order for DME Equipment being ordered: railing for stairs at home. 1 Units 0     order for DME Equipment being ordered: Knee walker  (Patient not taking: Reported on 6/4/2020) 1 Device 0     order for DME by Device route continuous Air walker -  Use as instructed (Patient not taking: Reported on 6/4/2020) 1 Device 0     ORDER FOR DME Equipment being ordered:  Ostomy supplies.  Drain Pouch by [employer] #.    Also needs Barrier by [employer] #. 3 Month 4     ORDER FOR DME Equipment being ordered: Cane 1 Units 0     PARoxetine (PAXIL) 20 MG tablet Take 3 tablets (60 mg) by mouth every morning       PARoxetine (PAXIL) 20 MG tablet TAKE 2 TABLETS AT BEDTIME (Prescribed by Dr. Isael Jiménez at [facility]) 30 tablet      pioglitazone (ACTOS) 45 MG tablet Take 1 tablet (45 mg) by mouth daily 90 tablet 3     propranolol ER (INDERAL LA) 60 MG 24 hr capsule Take 1 capsule (60 mg) by mouth daily 30 capsule 11     ramipril (ALTACE) 5 MG capsule Take 1 capsule (5 mg) by mouth daily 30 capsule 10     salicylic acid  "(MEDIPLAST) 40 % miscellaneous Apply to warts nightly as tolerated. 25 each 11     simvastatin (ZOCOR) 20 MG tablet Take 1 tablet (20 mg) by mouth At Bedtime 30 tablet 11     traMADol (ULTRAM) 50 MG tablet TAKE 1/2 TABLET BY MOUTH 3 TIMES A  tablet 0     traZODone (DESYREL) 50 MG tablet Take 1.5 tablets (75 mg) by mouth At Bedtime       UNIVERSAL REMOVER WIPES EX MISC uses to remove barrier from colostomy bag at time of change 1 Each 3     vitamin D3 (CHOLECALCIFEROL) 2000 units (50 mcg) tablet TAKE 1 TABLET BY MOUTH DAILY 100 tablet 3       Allergies   Allergen Reactions     Morphine Sulfate Itching       Family History   Problem Relation Age of Onset     Diabetes Mother      Cerebrovascular Disease Mother      Diabetes Father      Hypertension Father      Cancer Maternal Grandfather      Cancer Paternal Grandfather      Diabetes Brother      Diabetes Sister      Thyroid Disease Daughter      Thyroid Disease Sister      Multiple Sclerosis Daughter      Glaucoma No family hx of      Macular Degeneration No family hx of        Additional medical/Social/Surgical histories reviewed in [facility] and updated as appropriate.        PHYSICAL EXAM    Vitals:    06/05/20 1117   BP: 126/62   Weight: 98.9 kg (218 lb)   Height: 1.575 m (5' 2\")     General  - normal appearance, in no obvious distress  HEENT  - conjunctivae not injected, moist mucous membranes  CV  - normal peripheral perfusion  Pulm  - normal respiratory pattern, non-labored  Musculoskeletal - lumbar spine  - inspection: normal bone and joint alignment, no obvious scoliosis  - palpation: bilateral lumbar paravertebral tenderness  - ROM: no pain with bilateral rotation, flexion past 30 deg, extension  - strength: lower extremities 5/5 in all planes  - special tests:  (-) straight leg raise bilaterally  (-) slump test  Neuro  - patellar and Achilles DTRs 2+ bilaterally, no sensory or motor deficit, grossly normal coordination, normal muscle tone  Skin  - no " "ecchymosis, erythema, warmth, or induration, no obvious rash  Psych  - interactive, appropriate, normal mood and affect           ASSESSMENT & PLAN  Ms. Muir is a 65 year old female who presents to clinic today with back pain.    I reviewed a prior xray in the room with her, this is from 2017 and does show multilevel degenerative changes with a spondylolisthesis of L5 on S1 that appears to be grade 1.    We had a good discussion centering around different types of back pain and management options.  Given her failure of treatment with conservative therapy thus far I do think it would be reasonable to order an MRI with plans to facilitate an injection, if amenable.    I will get in touch with Holly with her MRI results.    It was a pleasure seeing Holly today.    Santy Harrington DO, CAQSM  [facility] Sports [facility]      This note was constructed using Dragon dictation software, please excuse any minor errors in spelling, grammar, or syntax.        [facility] FOLLOW-UP VISIT 6/5/2020    Holly Muir's chief complaint for this visit includes:  Chief Complaint   Patient presents with     RECHECK     right low back pain, no known injury, no pain down legs      PCP: Tiffany King    Referring Provider:  No referring provider defined for this encounter.    /62   Ht 1.575 m (5' 2\")   Wt 98.9 kg (218 lb)   BMI 39.87 kg/m    No Pain (0)       Interval History:     Follow up reason: low back pain right side     Following Therapeutic Plan: Yes     Pain: Worsening    Function: Worsening    Medical History:    Any recent changes to your medical history? No    Any new medication prescribed since last visit? No    Review of Systems:    Do you have fever, chills, weight loss? No    Do you have any vision problems? No    Do you have any chest pain or edema? No    Do you have any shortness of breath or wheezing?  No    Do you have stomach problems? No    Do you have any urinary track issues? No    Do you " have any numbness or focal weakness? No    Do you have diabetes? Yes,     Do you have problems with bleeding or clotting? No    Do you have an rashes or other skin lesions? No

## 2020-06-11 ENCOUNTER — TELEPHONE (OUTPATIENT)
Dept: PEDIATRICS | Facility: CLINIC | Age: 65
End: 2020-06-11

## 2020-06-11 NOTE — TELEPHONE ENCOUNTER
[facility] Call Center    Phone Message    May a detailed message be left on voicemail: yes     Reason for Call: Order(s): Home Care Orders: Skilled Nursing: once a week times 8. And 2 as needed.     Please advise.    Action Taken: Message routed to:  Primary Care p [phone]

## 2020-06-11 NOTE — TELEPHONE ENCOUNTER
Called [facility] RN.  Left message with verbal orders ok per RN protocol.      Liz Boogie RN, [facility]

## 2020-06-18 ENCOUNTER — OFFICE VISIT (OUTPATIENT)
Dept: PEDIATRICS | Facility: CLINIC | Age: 65
End: 2020-06-18
Payer: MEDICARE

## 2020-06-18 VITALS
SYSTOLIC BLOOD PRESSURE: 125 MMHG | DIASTOLIC BLOOD PRESSURE: 78 MMHG | WEIGHT: 213.9 LBS | OXYGEN SATURATION: 99 % | TEMPERATURE: 98 F | BODY MASS INDEX: 39.36 KG/M2 | HEART RATE: 72 BPM | HEIGHT: 62 IN

## 2020-06-18 DIAGNOSIS — Z79.4 UNCONTROLLED TYPE 2 DIABETES MELLITUS WITH HYPERGLYCEMIA, WITH LONG-TERM CURRENT USE OF INSULIN (H): ICD-10-CM

## 2020-06-18 DIAGNOSIS — R39.15 URINARY URGENCY: ICD-10-CM

## 2020-06-18 DIAGNOSIS — G89.4 CHRONIC PAIN SYNDROME: ICD-10-CM

## 2020-06-18 DIAGNOSIS — Z79.4 ENCOUNTER FOR LONG-TERM (CURRENT) USE OF INSULIN (H): ICD-10-CM

## 2020-06-18 DIAGNOSIS — E11.649 UNCONTROLLED TYPE 2 DIABETES MELLITUS WITH HYPOGLYCEMIA WITHOUT COMA (H): ICD-10-CM

## 2020-06-18 DIAGNOSIS — D64.9 ANEMIA, UNSPECIFIED TYPE: ICD-10-CM

## 2020-06-18 DIAGNOSIS — E11.65 UNCONTROLLED TYPE 2 DIABETES MELLITUS WITH HYPERGLYCEMIA, WITH LONG-TERM CURRENT USE OF INSULIN (H): ICD-10-CM

## 2020-06-18 DIAGNOSIS — Z01.818 PREOP GENERAL PHYSICAL EXAM: Primary | ICD-10-CM

## 2020-06-18 DIAGNOSIS — E78.5 HYPERLIPIDEMIA LDL GOAL <100: ICD-10-CM

## 2020-06-18 DIAGNOSIS — I10 HTN, GOAL BELOW 140/90: ICD-10-CM

## 2020-06-18 LAB
ANION GAP SERPL CALCULATED.3IONS-SCNC: 6 MMOL/L (ref 3–14)
BUN SERPL-MCNC: 12 MG/DL (ref 7–30)
CALCIUM SERPL-MCNC: 9.2 MG/DL (ref 8.5–10.1)
CHLORIDE SERPL-SCNC: 104 MMOL/L (ref 94–109)
CHOLEST SERPL-MCNC: 121 MG/DL
CO2 SERPL-SCNC: 26 MMOL/L (ref 20–32)
CREAT SERPL-MCNC: 0.89 MG/DL (ref 0.52–1.04)
CREAT UR-MCNC: 30 MG/DL
ERYTHROCYTE [DISTWIDTH] IN BLOOD BY AUTOMATED COUNT: 14.9 % (ref 10–15)
GFR SERPL CREATININE-BSD FRML MDRD: 68 ML/MIN/{1.73_M2}
GLUCOSE SERPL-MCNC: 107 MG/DL (ref 70–99)
HBA1C MFR BLD: 9.5 % (ref 0–5.6)
HCT VFR BLD AUTO: 36.3 % (ref 35–47)
HDLC SERPL-MCNC: 78 MG/DL
HGB BLD-MCNC: 11.6 G/DL (ref 11.7–15.7)
LDLC SERPL CALC-MCNC: 26 MG/DL
MCH RBC QN AUTO: 27.2 PG (ref 26.5–33)
MCHC RBC AUTO-ENTMCNC: 32 G/DL (ref 31.5–36.5)
MCV RBC AUTO: 85 FL (ref 78–100)
MICROALBUMIN UR-MCNC: 8 MG/L
MICROALBUMIN/CREAT UR: 27.25 MG/G CR (ref 0–25)
NONHDLC SERPL-MCNC: 43 MG/DL
PLATELET # BLD AUTO: 389 10E9/L (ref 150–450)
POTASSIUM SERPL-SCNC: 4 MMOL/L (ref 3.4–5.3)
RBC # BLD AUTO: 4.27 10E12/L (ref 3.8–5.2)
SODIUM SERPL-SCNC: 136 MMOL/L (ref 133–144)
TRIGL SERPL-MCNC: 87 MG/DL
WBC # BLD AUTO: 6.8 10E9/L (ref 4–11)

## 2020-06-18 PROCEDURE — 83036 HEMOGLOBIN GLYCOSYLATED A1C: CPT | Performed by: INTERNAL MEDICINE

## 2020-06-18 PROCEDURE — 99000 SPECIMEN HANDLING OFFICE-LAB: CPT | Performed by: INTERNAL MEDICINE

## 2020-06-18 PROCEDURE — 85027 COMPLETE CBC AUTOMATED: CPT | Performed by: INTERNAL MEDICINE

## 2020-06-18 PROCEDURE — 99215 OFFICE O/P EST HI 40 MIN: CPT | Performed by: INTERNAL MEDICINE

## 2020-06-18 PROCEDURE — 80048 BASIC METABOLIC PNL TOTAL CA: CPT | Performed by: INTERNAL MEDICINE

## 2020-06-18 PROCEDURE — 36415 COLL VENOUS BLD VENIPUNCTURE: CPT | Performed by: INTERNAL MEDICINE

## 2020-06-18 PROCEDURE — 82043 UR ALBUMIN QUANTITATIVE: CPT | Performed by: INTERNAL MEDICINE

## 2020-06-18 PROCEDURE — 80307 DRUG TEST PRSMV CHEM ANLYZR: CPT | Mod: 90 | Performed by: INTERNAL MEDICINE

## 2020-06-18 PROCEDURE — 80061 LIPID PANEL: CPT | Performed by: INTERNAL MEDICINE

## 2020-06-18 PROCEDURE — 93000 ELECTROCARDIOGRAM COMPLETE: CPT | Performed by: INTERNAL MEDICINE

## 2020-06-18 ASSESSMENT — MIFFLIN-ST. JEOR: SCORE: 1473.49

## 2020-06-18 NOTE — PROGRESS NOTES
[address] [address]  [phone]  Dept: [phone]    PRE-OP EVALUATION:  Today's date: 2020    Holly Muir (: 1955) presents for pre-operative evaluation assessment as requested by Dr. Medrano  She requires evaluation and anesthesia risk assessment prior to undergoing surgery/procedure for treatment of Simulator battery change.    Proposed Surgery/ Procedure: Simulator battery change  Date of Surgery/ Procedure: 2020  Time of Surgery/ Procedure: 11:00 AM  Hospital/Surgical Facility: [facility]  onsite  Primary Physician: Tiffany King  Type of Anesthesia Anticipated: General    Patient has a Health Care Directive or Living Will:  NO    1. NO - Do you have a history of heart attack, stroke, stent, bypass or surgery on an artery in the head, neck, heart or legs?  2. NO - Do you ever have any pain or discomfort in your chest?  3. NO - Do you have a history of  Heart Failure?  4. YES - ARE YOUR TROUBLED BY SHORTNESS OF BREATH WHEN WALKING ON THE LEVEL, UP A SLIGHT HILL OR AT NIGHT? Short of breath, chronic  5. NO - Do you currently have a cold, bronchitis or other respiratory infection?  6. NO - Do you have a cough, shortness of breath or wheezing?  7. NO - Do you sometimes get pains in the calves of your legs when you walk?  9. NO - DO YOU OR DOES ANYONE IN YOUR FAMILY HAVE A SERIOUS BLEEDING PROBLEM SUCH AS PROLONGED BLEEDING FOLLOWING SURGERIES OR CUTS?  9. NO - Do you or does anyone in your family have a serious bleeding problem such as prolonged bleeding following surgeries or cuts?  10. NO - Have you ever had problems with anemia or been told to take iron pills?  11. NO - Have you had any abnormal blood loss such as black, tarry or bloody stools, or abnormal vaginal bleeding?  12. YES - HAVE YOU EVER HAD A BLOOD TRANSFUSION? Manyyears ago  13. NO - Have you or any of your relatives ever had problems with anesthesia?  14. NO -  Do you have sleep apnea, excessive snoring or daytime drowsiness?  15. NO - Do you have any prosthetic heart valves?  16. NO - Do you have prosthetic joints?  17. NO - Is there any chance that you may be pregnant?      HPI:     HPI related to upcoming procedure: history of Interstim placement .scheduled for battery change.       DEPRESSION - Patient has a long history of Depression of moderate severity requiring medication for control with recent symptoms being stable..    DIABETES - Patient has a longstanding history of DiabetesType Type II . Patient is being treated with insulin injections and denies significant side effects. Control has been poor. Complicating factors include but are not limited to: hypertension, hyperlipidemia and neuropathy.     HYPERLIPIDEMIA - Patient has a long history of significant Hyperlipidemia requiring medication for treatment with recent good control. Patient reports no problems or side effects with the medication.     HYPERTENSION - Patient has longstanding history of HTN , currently denies any symptoms referable to elevated blood pressure. Specifically denies chest pain, palpitations, dyspnea, orthopnea, PND or peripheral edema. Blood pressure readings have been in normal range. Current medication regimen is as listed below. Patient denies any side effects of medication.       MEDICAL HISTORY:     Patient Active Problem List    Diagnosis Date Noted     Fecal urgency 05/20/2020     Priority: Medium     Added automatically from request for surgery [id number]       Pseudophakia of right eye 10/25/2019     Priority: Medium     Anal stenosis 06/27/2019     Priority: Medium     S/P partial colectomy 06/27/2019     Priority: Medium     GERD (gastroesophageal reflux disease) 01/23/2019     Priority: Medium     Anemia 01/22/2019     Priority: Medium     Chest pain, musculoskeletal 01/22/2019     Priority: Medium     Diabetic neuropathy (H) 01/22/2019     Priority: Medium     Hyponatremia  01/22/2019     Priority: Medium     Schizoaffective disorder (H) 01/22/2019     Priority: Medium     Obesity (BMI 35.0-39.9) with comorbidity (H) 11/14/2018     Priority: Medium     Chronic pain syndrome 08/08/2018     Priority: Medium     Chronic DJD the knee and back. Tramadol 2 tablets daily. Initial agreement signed in 2014.   New pain agreement signed 8/8/2018        Type 2 diabetes mellitus with hyperglycemia (H) 06/08/2016     Priority: Medium     Visual hallucination 04/21/2016     Priority: Medium     Fecal incontinence due to anorectal disorder 09/28/2015     Priority: Medium     Anxiety 09/02/2015     Priority: Medium     Bilateral low back pain without sciatica 06/17/2015     Priority: Medium     ACP (advance care planning) 02/12/2015     Priority: Medium     Advance Care Planning:ACP Review and Resources Provided:  Reviewed chart for advance care plan.  Holly Muir has no plan or code status on file. Discussed available resources and provided with information. Confirmed/documented designated decision maker(s). See permanent comments section of demographics in clinical tab. Added by Zara Elias on 2/12/2015           Urinary retention 01/22/2015     Priority: Medium     Colostomy, evaluate (H) 10/17/2014     Priority: Medium     Encounter for long-term (current) use of insulin (H) 10/12/2014     Priority: Medium     Hypoglycemia due to insulin 09/17/2014     Priority: Medium     Rotator cuff impingement syndrome 08/07/2014     Priority: Medium     Health Care Home 09/20/2013     Priority: Medium     Date:  3-4-16  Status:  Accepted           Cataracts, both eyes 05/08/2013     Priority: Medium     Dermatochalasis 05/10/2012     Priority: Medium     Presbyopia 05/10/2012     Priority: Medium     OCD (obsessive compulsive disorder) 05/10/2012     Priority: Medium     HTN, goal below 140/90 05/10/2012     Priority: Medium     Pain in joint, hand 01/25/2012     Priority: Medium     Trigger finger,  acquired 01/25/2012     Priority: Medium     Problem list name updated by automated process. Provider to review       Synovitis and tenosynovitis 01/25/2012     Priority: Medium     Problem list name updated by automated process. Provider to review       Obesity 03/23/2011     Priority: Medium     Type 2 diabetes mellitus, uncontrolled A1C goal <8% 10/31/2010     Priority: Medium     3:1 insulin to carb ratio       Mixed incontinence urge and stress 06/01/2010     Priority: Medium     Memory loss 06/01/2010     Priority: Medium     Diabetes coma around 2003       S/P colostomy (H) 04/03/2010     Priority: Medium     Due to injury to colon while she had a hysterectomy (age late 30s):  Seen Dr. Doherty in colorectal surgery at [facility]-does not want to operated. Needs her social support and memory loss issues be resolved first.        History of cervical cancer 09/23/2009     Priority: Medium     status post NATALIE/BSO. Being followed by Dr. Angelita Addison at [facility] gyn/onc       Severe major depression with psychotic features (H) 07/31/2009     Priority: Medium     Psychiatrist: Dr. Perkins at [facility].   Neuropsychological evalation at [facility] on 12/1/09: lower end cognitive functioning and multifactorial in etiologies including effects of psychosis, medications nd perhaps non-restorative sleep. Also has features of OCD. Recommend psychiatry referral for further evaluation of past Dx of schizophrenia and also psychotherapy.   Sees a therapist monthly now (has met goals) but will monitor for relapse        Hyperlipidemia LDL goal <100 01/27/2009     Priority: Medium     Lumbago 01/29/2014     Priority: Low     Sprain of lumbar region 01/29/2014     Priority: Low     Generalized osteoarthrosis, unspecified site 01/29/2014     Priority: Low      Past Medical History:   Diagnosis Date     Bilateral knee pain      Cataracts, both eyes 5/8/2013     Cervical cancer  (H)      Diabetes      Diabetic retinopathy (H)      HTN      Inflammatory arthritis      Major depression      Memory loss      Nephropathy      OA (osteoarthritis) of knee 9/11/2013     Other and unspecified hyperlipidemia      Pterygium eye      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     Past Surgical History:   Procedure Laterality Date     ARTHROSCOPY KNEE RT/LT      LT     BLEPHAROPLASTY BILATERAL Bilateral 8/16/2019    Procedure: BILATERAL UPPER LID BLEPHAROPLASTY;  Surgeon: Randolph Cook MD;  Location: [facility] OR     BREAST LUMPECTOMY, RT/LT      LT-benign      C EXCIS PTERYGIUM  10/08    [facility]     C STOMACH SURGERY PROCEDURE UNLISTED       CATARACT IOL, RT/LT       COLONOSCOPY  5/10/2012    Procedure:COLONOSCOPY; screening colonoscopy; Surgeon:MILLA VEGA; Location:[facility] OR     COLONOSCOPY WITH CO2 INSUFFLATION N/A 8/2/2019    Procedure: Colonoscopy with CO2 insufflation;  Surgeon: Himanshu Hi MD;  Location: [facility] OR     COLOSTOMY  2000     COLOSTOMY       [facility] COLONOSCOPY THRU STOMA, DIAGNOSTIC  2002    normal per report-no records available-records destroyed     HYSTERECTOMY, PAP NO LONGER INDICATED      done in [place]-cervical cancer     IMPLANT STIMULATOR SACRAL NERVE STAGE ONE Right 3/10/2015    Procedure: IMPLANT STIMULATOR SACRAL NERVE STAGE ONE;  Surgeon: Teodora Yusuf MD;  Location: [facility]     IMPLANT STIMULATOR SACRAL NERVE STAGE TWO Right 3/31/2015    Procedure: IMPLANT STIMULATOR SACRAL NERVE STAGE TWO;  Surgeon: Teodora Yusuf MD;  Location: [facility] OR     PHACOEMULSIFICATION WITH STANDARD INTRAOCULAR LENS IMPLANT Left 9/26/2019    Procedure: LEFT PHACOEMULSIFICATION, CATARACT, WITH STANDARD IOL INSERTION;  Surgeon: Francesco Winn MD;  Location: [facility] OR     PHACOEMULSIFICATION WITH STANDARD INTRAOCULAR LENS IMPLANT Right 10/24/2019    Procedure: RIGHT PHACOEMULSIFICATION, CATARACT, WITH STANDARD IOL INSERTION;  Surgeon: Francesco Winn MD;   Location: [facility] OR     REPAIR PTOSIS Bilateral 08/16/2019     SALPINGO OOPHORECTOMY,R/L/JENNIFER      Salpingo Oophorectomy, RT/LT/JENNIFER     Current Outpatient Medications   Medication Sig Dispense Refill     albuterol (PROAIR HFA/PROVENTIL HFA/VENTOLIN HFA) 108 (90 Base) MCG/ACT inhaler Inhale 2 puffs into the lungs every 6 hours (Patient not taking: Reported on 12/10/2019) 1 Inhaler 0     ARIPiprazole (ABILIFY) 30 MG tablet Take 1 tablet (30 mg) by mouth daily       ARIPiprazole (ABILIFY) 5 MG tablet Take 1 tablet (5 mg) by mouth At Bedtime Along with 5mg tab to total 25mg.  Prescribed by Dr. Isael Jiménez at [facility] 30 tablet 1     blood glucose (ACCU-CHEK CELIA) test strip Use to test blood sugars 3 times daily or as directed. 300 each 3     blood glucose monitoring (ACCU-CHEK CELIA PLUS) meter device kit Use to test blood sugars 4 times daily or as directed. 1 kit 0     blood glucose monitoring (ACCU-CHEK FASTCLIX) lancets Use to test blood sugar 6 times daily or as directed. 510 each 1     buPROPion (WELLBUTRIN SR) 150 MG 12 hr tablet Take 1 tablet (150 mg) by mouth 2 times daily       buPROPion (WELLBUTRIN XL) 300 MG 24 hr tablet TAKE 1 TABLET EVERY DAY. (TOTAL DAILY DOSE = 300MG)  1     busPIRone HCl (BUSPAR) 30 MG tablet TAKE 1 TABLET BY MOUTH TWICE A DAY 60 tablet 0     cetirizine (ZYRTEC) 10 MG tablet Take 1 tablet (10 mg) by mouth daily 30 tablet 5     [employer]S ASPIRIN ADULT LOW DOSE 81 MG chewable tablet TAKE 1 TABLET BY MOUTH EVERY DAY 90 tablet 3     [employer] PAIN RELIEF 500 MG tablet TAKE 2 TABLETS BY MOUTH EVERY 8 HOURS. MAX ACETAMINOPHEN DOSE: 4000MG IN 24 HRS. 200 tablet 11     gabapentin (NEURONTIN) 600 MG tablet Take 1 tablet (600 mg) by mouth 3 times daily 90 tablet 11     ibuprofen (ADVIL/MOTRIN) 400 MG tablet Take 1 tablet (400 mg) by mouth every 8 hours as needed for moderate pain (with food)       insulin aspart (NOVOLOG FLEXPEN) 100 UNIT/ML pen 15 unit subcutaneous per meal three times a day. 30  mL 5     insulin glargine (BASAGLAR KWIKPEN) 100 UNIT/ML pen Take 55 units daily. 60 mL 5     insulin pen needle 31G X 5 MM Use four times 4 day times a day 400 each 1     metFORMIN (GLUCOPHAGE) 1000 MG tablet Take 1 tablet (1,000 mg) by mouth 2 times daily (with meals) 180 tablet 3     Multiple Vitamin (DAILY-ISAAC) TABS TAKE 1 TABLET BY MOUTH DAILY 90 tablet 3     nystatin (MYCOSTATIN) 234988 UNIT/GM external powder Apply 1 dose topically 3 times daily as needed 60 g 3     omeprazole (PRILOSEC) 20 MG DR capsule TAKE 2 CAPSULES BY MOUTH DAILY TAKE 30-60 MINUTES BEFORE A MEAL. 180 capsule 3     order for DME Equipment being ordered: grab bar for bath tub/shower. 1 Units 0     order for DME Equipment being ordered: railing for stairs at home. 1 Units 0     order for DME Equipment being ordered: Knee walker  (Patient not taking: Reported on 6/4/2020) 1 Device 0     order for DME by Device route continuous Air walker -  Use as instructed (Patient not taking: Reported on 6/4/2020) 1 Device 0     ORDER FOR DME Equipment being ordered:  Ostomy supplies.  Drain Pouch by [employer] #.    Also needs Barrier by [employer] #. 3 Month 4     ORDER FOR DME Equipment being ordered: Cane 1 Units 0     PARoxetine (PAXIL) 20 MG tablet TAKE 2 TABLETS AT BEDTIME (Prescribed by Dr. Isael Jiménez at [facility]) 30 tablet      pioglitazone (ACTOS) 45 MG tablet Take 1 tablet (45 mg) by mouth daily 90 tablet 3     propranolol ER (INDERAL LA) 60 MG 24 hr capsule Take 1 capsule (60 mg) by mouth daily 30 capsule 11     ramipril (ALTACE) 5 MG capsule Take 1 capsule (5 mg) by mouth daily 30 capsule 10     salicylic acid (MEDIPLAST) 40 % miscellaneous Apply to warts nightly as tolerated. 25 each 11     simvastatin (ZOCOR) 20 MG tablet Take 1 tablet (20 mg) by mouth At Bedtime 30 tablet 11     traMADol (ULTRAM) 50 MG tablet TAKE 1/2 TABLET BY MOUTH 3 TIMES A  tablet 0     traZODone (DESYREL) 50 MG tablet Take 1.5  tablets (75 mg) by mouth At Bedtime       UNIVERSAL REMOVER WIPES EX MISC uses to remove barrier from colostomy bag at time of change 1 Each 3     vitamin D3 (CHOLECALCIFEROL) 2000 units (50 mcg) tablet TAKE 1 TABLET BY MOUTH DAILY 100 tablet 3     OTC products: None, except as noted above    Allergies   Allergen Reactions     Morphine Sulfate Itching      Latex Allergy: NO    Social History     Tobacco Use     Smoking status: Former Smoker     Packs/day: 0.00     Last attempt to quit: 8/9/2016     Years since quitting: 3.8     Smokeless tobacco: Never Used   Substance Use Topics     Alcohol use: Yes     Alcohol/week: 0.0 standard drinks     Comment: social occasions     History   Drug Use No       REVIEW OF SYSTEMS:   CONSTITUTIONAL: NEGATIVE for fever, chills, change in weight  ENT/MOUTH: NEGATIVE for ear, mouth and throat problems  RESP: NEGATIVE for significant cough or SOB  CV: NEGATIVE for chest pain, palpitations or peripheral edema  GI: NEGATIVE for nausea, abdominal pain, heartburn, or change in bowel habits  MUSCULOSKELETAL: NEGATIVE for significant arthralgias or myalgia    EXAM:   There were no vitals taken for this visit.  GENERAL APPEARANCE: healthy, alert and no distress  HENT: ear canals and TM's normal and nose and mouth without ulcers or lesions  RESP: lungs clear to auscultation - no rales, rhonchi or wheezes  CV: regular rate and rhythm, normal S1 S, no S3 or S4 and no murmur, click or rub   ABDOMEN: soft, nontender, no HSM or masses and bowel sounds normal  MS: extremities normal- no gross deformities noted  NEURO: Normal strength and tone, sensory exam grossly normal, mentation intact and speech normal    DIAGNOSTICS:   EKG: appears normal, NSR  Results for orders placed or performed in visit on 06/18/20   Lipid panel reflex to direct LDL Fasting     Status: None   Result Value Ref Range    Cholesterol 121 <200 mg/dL    Triglycerides 87 <150 mg/dL    HDL Cholesterol 78 >49 mg/dL    LDL  Cholesterol Calculated 26 <100 mg/dL    Non HDL Cholesterol 43 <130 mg/dL   Hemoglobin A1c     Status: Abnormal   Result Value Ref Range    Hemoglobin A1C 9.5 (H) 0 - 5.6 %   Basic metabolic panel     Status: Abnormal   Result Value Ref Range    Sodium 136 133 - 144 mmol/L    Potassium 4.0 3.4 - 5.3 mmol/L    Chloride 104 94 - 109 mmol/L    Carbon Dioxide 26 20 - 32 mmol/L    Anion Gap 6 3 - 14 mmol/L    Glucose 107 (H) 70 - 99 mg/dL    Urea Nitrogen 12 7 - 30 mg/dL    Creatinine 0.89 0.52 - 1.04 mg/dL    GFR Estimate 68 >60 mL/min/[1.73_m2]    GFR Estimate If Black 79 >60 mL/min/[1.73_m2]    Calcium 9.2 8.5 - 10.1 mg/dL   CBC with platelets     Status: Abnormal   Result Value Ref Range    WBC 6.8 4.0 - 11.0 10e9/L    RBC Count 4.27 3.8 - 5.2 10e12/L    Hemoglobin 11.6 (L) 11.7 - 15.7 g/dL    Hematocrit 36.3 35.0 - 47.0 %    MCV 85 78 - 100 fl    MCH 27.2 26.5 - 33.0 pg    MCHC 32.0 31.5 - 36.5 g/dL    RDW 14.9 10.0 - 15.0 %    Platelet Count 389 150 - 450 10e9/L     IMPRESSION:    inReason for surgery/procedure: Interstim battery change  Diagnosis/reason for consult:      ICD-10-CM    1. Preop general physical exam  Z01.818 EKG 12-lead complete w/read - Clinics     Lipid panel reflex to direct LDL Fasting     Albumin Random Urine Quantitative with Creat Ratio     Hemoglobin A1c     Basic metabolic panel     CBC with platelets     CANCELED: Lipid panel reflex to direct LDL Fasting     CANCELED: Albumin Random Urine Quantitative with Creat Ratio   2. Urinary urgency  R39.15    3. Uncontrolled type 2 diabetes mellitus with hypoglycemia without coma (H)  E11.649 Hemoglobin A1c     CANCELED: Albumin Random Urine Quantitative with Creat Ratio   4. HTN, goal below 140/90  I10 Basic metabolic panel     CBC with platelets     CANCELED: Albumin Random Urine Quantitative with Creat Ratio   5. Encounter for long-term (current) use of insulin (H)  Z79.4    6. Chronic pain syndrome  G89.4 Pain Drug Scr UR W Rptd Meds   7.  Hyperlipidemia LDL goal <100  E78.5 Lipid panel reflex to direct LDL Fasting     CANCELED: Lipid panel reflex to direct LDL Fasting        The proposed surgical procedure is considered LOW risk.    REVISED CARDIAC RISK INDEX  The patient has the following serious cardiovascular risks for perioperative complications such as (MI, PE, VFib and 3  AV Block):  Diabetes Mellitus (on Insulin)  INTERPRETATION: 1 risks: Class II (low risk - 0.9% complication rate)    The patient has the following additional risks for perioperative complications:  No identified additional risks      RECOMMENDATIONS:       Cardiovascular Risk  Patient is already on a Beta Blocker. Continue Betablocker therapy after surgery, using Beta blocker order set as necessary for NPO status.      Obstructive Sleep Apnea (or suspected sleep apnea)  Patient is to bring their home CPAP with them on the day of surgery      Anemia  Anemia and does not require treatment prior to surgery.  Monitor Hemoglobin postoperatively.      --Patient is to take all scheduled medications on the day of surgery EXCEPT for modifications listed below.    Diabetes Medication Use  -----Hold usual oral and non-insulin diabetic meds (e.g. Metformin, Actos, Glipizide) while NPO.   -----Take 80% of long acting insulin (e.g. Lantus, NPH) while NPO (fasting)        APPROVAL GIVEN to proceed with proposed procedure, without further diagnostic evaluation       Signed Electronically by: Tiffany King MD PhD    Copy of this evaluation report is provided to requesting physician.    [place] Preop Guidelines    Revised Cardiac Risk Index

## 2020-06-18 NOTE — PATIENT INSTRUCTIONS
Make appointment(s) for:   -- get labs today         Before your surgery:  10 days before: stop all over the counter supplements  1 week before: stop aspirin if you are taking aspirin.  2 days before: stop ibuprofen, naproxen or similar antiinflammatory medications. You may use Tylenol for pain or headache.     On the day of your surgery:  You may take all scheduled medications with the smallest amount of water possible except for the followin. Hold all oral diabetes medications (Metformin and pioglitazone) the morning of your procedure.   2. Hold Novolog the morning of surgery  3. Reduce Basaglar to 44 units the evening before surgery and resume regular dose the evening of surgery.     After surgery:   You may resume all your medications after the surgery unless your surgeon instructs you otherwise.           Before Your Surgery      Call your surgeon if there is any change in your health. This includes signs of a cold or flu (such as a sore throat, runny nose, cough, rash or fever).    Do not smoke, drink alcohol or take over the counter medicine (unless your surgeon or primary care doctor tells you to) for the 24 hours before and after surgery.    If you take prescribed drugs: Follow your doctor s orders about which medicines to take and which to stop until after surgery.    Eating and drinking prior to surgery: follow the instructions from your surgeon    Take a shower or bath the night before surgery. Use the soap your surgeon gave you to gently clean your skin. If you do not have soap from your surgeon, use your regular soap. Do not shave or scrub the surgery site.  Wear clean pajamas and have clean sheets on your bed.

## 2020-06-19 ENCOUNTER — ANESTHESIA EVENT (OUTPATIENT)
Dept: SURGERY | Facility: AMBULATORY SURGERY CENTER | Age: 65
End: 2020-06-19

## 2020-06-19 ENCOUNTER — TELEPHONE (OUTPATIENT)
Dept: ORTHOPEDICS | Facility: CLINIC | Age: 65
End: 2020-06-19
Payer: MEDICARE

## 2020-06-19 DIAGNOSIS — M54.50 CHRONIC BILATERAL LOW BACK PAIN WITHOUT SCIATICA: Primary | ICD-10-CM

## 2020-06-19 DIAGNOSIS — G89.29 CHRONIC BILATERAL LOW BACK PAIN WITHOUT SCIATICA: Primary | ICD-10-CM

## 2020-06-19 NOTE — TELEPHONE ENCOUNTER
Patient called and states she had MRI scheduled for today at the [facility], but they canceled it due to a stimulator patient has in her back. Patient requesting call back to discuss her options. Please advise.

## 2020-06-20 DIAGNOSIS — Z11.59 ENCOUNTER FOR SCREENING FOR OTHER VIRAL DISEASES: ICD-10-CM

## 2020-06-20 PROCEDURE — U0003 INFECTIOUS AGENT DETECTION BY NUCLEIC ACID (DNA OR RNA); SEVERE ACUTE RESPIRATORY SYNDROME CORONAVIRUS 2 (SARS-COV-2) (CORONAVIRUS DISEASE [COVID-19]), AMPLIFIED PROBE TECHNIQUE, MAKING USE OF HIGH THROUGHPUT TECHNOLOGIES AS DESCRIBED BY CMS-2020-01-R: HCPCS | Performed by: UROLOGY

## 2020-06-21 LAB
SARS-COV-2 RNA SPEC QL NAA+PROBE: NOT DETECTED
SPECIMEN SOURCE: NORMAL

## 2020-06-22 ENCOUNTER — HOSPITAL ENCOUNTER (OUTPATIENT)
Facility: AMBULATORY SURGERY CENTER | Age: 65
Discharge: HOME | End: 2020-06-22
Attending: UROLOGY | Admitting: UROLOGY
Payer: MEDICARE

## 2020-06-22 ENCOUNTER — ANESTHESIA (OUTPATIENT)
Dept: SURGERY | Facility: AMBULATORY SURGERY CENTER | Age: 65
End: 2020-06-22

## 2020-06-22 VITALS
RESPIRATION RATE: 20 BRPM | SYSTOLIC BLOOD PRESSURE: 141 MMHG | OXYGEN SATURATION: 97 % | TEMPERATURE: 97.8 F | DIASTOLIC BLOOD PRESSURE: 63 MMHG

## 2020-06-22 DIAGNOSIS — G89.18 POST-OP PAIN: Primary | ICD-10-CM

## 2020-06-22 DIAGNOSIS — R15.2 FECAL URGENCY: ICD-10-CM

## 2020-06-22 LAB
GLUCOSE BLDC GLUCOMTR-MCNC: 153 MG/DL (ref 70–99)
GLUCOSE BLDC GLUCOMTR-MCNC: 153 MG/DL (ref 70–99)

## 2020-06-22 PROCEDURE — C1767 GENERATOR, NEURO NON-RECHARG: HCPCS

## 2020-06-22 PROCEDURE — G8916 PT W IV AB GIVEN ON TIME: HCPCS

## 2020-06-22 PROCEDURE — G8907 PT DOC NO EVENTS ON DISCHARG: HCPCS

## 2020-06-22 PROCEDURE — 64590 INS/RPL PRPH SAC/GSTR NPG/R: CPT

## 2020-06-22 DEVICE — STIMULATOR NEURO INTER-STIM II 3058: Type: IMPLANTABLE DEVICE | Site: BUTTOCKS | Status: FUNCTIONAL

## 2020-06-22 RX ORDER — ONDANSETRON 4 MG/1
4 TABLET, ORALLY DISINTEGRATING ORAL EVERY 30 MIN PRN
Status: DISCONTINUED | OUTPATIENT
Start: 2020-06-22 | End: 2020-06-23 | Stop reason: HOSPADM

## 2020-06-22 RX ORDER — CEFAZOLIN SODIUM 2 G/100ML
2 INJECTION, SOLUTION INTRAVENOUS
Status: COMPLETED | OUTPATIENT
Start: 2020-06-22 | End: 2020-06-22

## 2020-06-22 RX ORDER — LIDOCAINE 40 MG/G
CREAM TOPICAL
Status: DISCONTINUED | OUTPATIENT
Start: 2020-06-22 | End: 2020-06-23 | Stop reason: HOSPADM

## 2020-06-22 RX ORDER — HYDROCODONE BITARTRATE AND ACETAMINOPHEN 5; 325 MG/1; MG/1
1-2 TABLET ORAL EVERY 4 HOURS PRN
Qty: 10 TABLET | Refills: 0 | Status: SHIPPED | OUTPATIENT
Start: 2020-06-22 | End: 2020-08-29

## 2020-06-22 RX ORDER — HYDROCODONE BITARTRATE AND ACETAMINOPHEN 5; 325 MG/1; MG/1
1 TABLET ORAL ONCE
Status: DISCONTINUED | OUTPATIENT
Start: 2020-06-22 | End: 2020-06-23 | Stop reason: HOSPADM

## 2020-06-22 RX ORDER — BUPIVACAINE HYDROCHLORIDE AND EPINEPHRINE 2.5; 5 MG/ML; UG/ML
INJECTION, SOLUTION INFILTRATION; PERINEURAL PRN
Status: DISCONTINUED | OUTPATIENT
Start: 2020-06-22 | End: 2020-06-22 | Stop reason: HOSPADM

## 2020-06-22 RX ORDER — DEXAMETHASONE SODIUM PHOSPHATE 4 MG/ML
4 INJECTION, SOLUTION INTRA-ARTICULAR; INTRALESIONAL; INTRAMUSCULAR; INTRAVENOUS; SOFT TISSUE EVERY 10 MIN PRN
Status: DISCONTINUED | OUTPATIENT
Start: 2020-06-22 | End: 2020-06-23 | Stop reason: HOSPADM

## 2020-06-22 RX ORDER — LIDOCAINE HYDROCHLORIDE 20 MG/ML
INJECTION, SOLUTION INFILTRATION; PERINEURAL PRN
Status: DISCONTINUED | OUTPATIENT
Start: 2020-06-22 | End: 2020-06-22

## 2020-06-22 RX ORDER — PROPOFOL 10 MG/ML
INJECTION, EMULSION INTRAVENOUS CONTINUOUS PRN
Status: DISCONTINUED | OUTPATIENT
Start: 2020-06-22 | End: 2020-06-22

## 2020-06-22 RX ORDER — PROPOFOL 10 MG/ML
INJECTION, EMULSION INTRAVENOUS PRN
Status: DISCONTINUED | OUTPATIENT
Start: 2020-06-22 | End: 2020-06-22

## 2020-06-22 RX ORDER — KETOROLAC TROMETHAMINE 30 MG/ML
30 INJECTION, SOLUTION INTRAMUSCULAR; INTRAVENOUS EVERY 6 HOURS PRN
Status: DISCONTINUED | OUTPATIENT
Start: 2020-06-22 | End: 2020-06-23 | Stop reason: HOSPADM

## 2020-06-22 RX ORDER — NALOXONE HYDROCHLORIDE 0.4 MG/ML
.1-.4 INJECTION, SOLUTION INTRAMUSCULAR; INTRAVENOUS; SUBCUTANEOUS
Status: DISCONTINUED | OUTPATIENT
Start: 2020-06-22 | End: 2020-06-23 | Stop reason: HOSPADM

## 2020-06-22 RX ORDER — FENTANYL CITRATE 50 UG/ML
25-50 INJECTION, SOLUTION INTRAMUSCULAR; INTRAVENOUS
Status: DISCONTINUED | OUTPATIENT
Start: 2020-06-22 | End: 2020-06-23 | Stop reason: HOSPADM

## 2020-06-22 RX ORDER — HYDROMORPHONE HYDROCHLORIDE 1 MG/ML
.3-.5 INJECTION, SOLUTION INTRAMUSCULAR; INTRAVENOUS; SUBCUTANEOUS EVERY 10 MIN PRN
Status: DISCONTINUED | OUTPATIENT
Start: 2020-06-22 | End: 2020-06-23 | Stop reason: HOSPADM

## 2020-06-22 RX ORDER — SODIUM CHLORIDE, SODIUM LACTATE, POTASSIUM CHLORIDE, CALCIUM CHLORIDE 600; 310; 30; 20 MG/100ML; MG/100ML; MG/100ML; MG/100ML
INJECTION, SOLUTION INTRAVENOUS CONTINUOUS
Status: DISCONTINUED | OUTPATIENT
Start: 2020-06-22 | End: 2020-06-23 | Stop reason: HOSPADM

## 2020-06-22 RX ORDER — OXYCODONE HYDROCHLORIDE 5 MG/1
5-10 TABLET ORAL EVERY 4 HOURS PRN
Status: DISCONTINUED | OUTPATIENT
Start: 2020-06-22 | End: 2020-06-23 | Stop reason: HOSPADM

## 2020-06-22 RX ORDER — FENTANYL CITRATE 50 UG/ML
INJECTION, SOLUTION INTRAMUSCULAR; INTRAVENOUS PRN
Status: DISCONTINUED | OUTPATIENT
Start: 2020-06-22 | End: 2020-06-22

## 2020-06-22 RX ORDER — ONDANSETRON 2 MG/ML
4 INJECTION INTRAMUSCULAR; INTRAVENOUS EVERY 30 MIN PRN
Status: DISCONTINUED | OUTPATIENT
Start: 2020-06-22 | End: 2020-06-23 | Stop reason: HOSPADM

## 2020-06-22 RX ORDER — ALBUTEROL SULFATE 0.83 MG/ML
2.5 SOLUTION RESPIRATORY (INHALATION) EVERY 4 HOURS PRN
Status: DISCONTINUED | OUTPATIENT
Start: 2020-06-22 | End: 2020-06-23 | Stop reason: HOSPADM

## 2020-06-22 RX ORDER — MEPERIDINE HYDROCHLORIDE 25 MG/ML
12.5 INJECTION INTRAMUSCULAR; INTRAVENOUS; SUBCUTANEOUS
Status: DISCONTINUED | OUTPATIENT
Start: 2020-06-22 | End: 2020-06-23 | Stop reason: HOSPADM

## 2020-06-22 RX ORDER — ACETAMINOPHEN 325 MG/1
975 TABLET ORAL ONCE
Status: COMPLETED | OUTPATIENT
Start: 2020-06-22 | End: 2020-06-22

## 2020-06-22 RX ORDER — CEFAZOLIN SODIUM 1 G/3ML
1 INJECTION, POWDER, FOR SOLUTION INTRAMUSCULAR; INTRAVENOUS SEE ADMIN INSTRUCTIONS
Status: DISCONTINUED | OUTPATIENT
Start: 2020-06-22 | End: 2020-06-23 | Stop reason: HOSPADM

## 2020-06-22 RX ADMIN — PROPOFOL 50 MG: 10 INJECTION, EMULSION INTRAVENOUS at 12:44

## 2020-06-22 RX ADMIN — FENTANYL CITRATE 25 MCG: 50 INJECTION, SOLUTION INTRAMUSCULAR; INTRAVENOUS at 13:07

## 2020-06-22 RX ADMIN — LIDOCAINE HYDROCHLORIDE 60 MG: 20 INJECTION, SOLUTION INFILTRATION; PERINEURAL at 12:44

## 2020-06-22 RX ADMIN — OXYCODONE HYDROCHLORIDE 5 MG: 5 TABLET ORAL at 13:43

## 2020-06-22 RX ADMIN — SODIUM CHLORIDE, SODIUM LACTATE, POTASSIUM CHLORIDE, CALCIUM CHLORIDE: 600; 310; 30; 20 INJECTION, SOLUTION INTRAVENOUS at 10:30

## 2020-06-22 RX ADMIN — ACETAMINOPHEN 975 MG: 325 TABLET ORAL at 10:18

## 2020-06-22 RX ADMIN — FENTANYL CITRATE 25 MCG: 50 INJECTION, SOLUTION INTRAMUSCULAR; INTRAVENOUS at 12:43

## 2020-06-22 RX ADMIN — PROPOFOL 150 MCG/KG/MIN: 10 INJECTION, EMULSION INTRAVENOUS at 12:44

## 2020-06-22 RX ADMIN — CEFAZOLIN SODIUM 2 G: 2 INJECTION, SOLUTION INTRAVENOUS at 12:45

## 2020-06-22 RX ADMIN — SODIUM CHLORIDE, SODIUM LACTATE, POTASSIUM CHLORIDE, CALCIUM CHLORIDE: 600; 310; 30; 20 INJECTION, SOLUTION INTRAVENOUS at 12:42

## 2020-06-22 NOTE — ANESTHESIA POSTPROCEDURE EVALUATION
Anesthesia POST Procedure Evaluation    Patient: Holly Muir   MRN:     [id number] Gender:   female   Age:    65 year old :      1955        Preoperative Diagnosis: Fecal urgency [R15.2]   Procedure(s):  INSERTION, SACRAL NERVE STIMULATOR, STAGE 2 (replacement of interstim battery)   Postop Comments: No value filed.     Anesthesia Type: MAC          Postop Pain Control: Uneventful            Sign Out: Well controlled pain   PONV: No   Neuro/Psych: Uneventful            Sign Out: Acceptable/Baseline neuro status   Airway/Respiratory: Uneventful            Sign Out: Acceptable/Baseline resp. status   CV/Hemodynamics: Uneventful            Sign Out: Acceptable CV status   Other NRE: NONE   DID A NON-ROUTINE EVENT OCCUR? No         Last Anesthesia Record Vitals:  CRNA VITALS  2020 1248 - 2020 1348      2020             Pulse:  94    SpO2:  97 %          Last PACU Vitals:  Vitals Value Taken Time   /67 2020  1:21 PM   Temp 36.6  C (97.9  F) 2020  1:21 PM   Pulse     Resp 20 2020  1:21 PM   SpO2 98 % 2020  1:21 PM   Temp src Skin 2020  1:15 PM   NIBP 112/58 2020  1:16 PM   Pulse 94 2020  1:18 PM   SpO2 97 % 2020  1:18 PM   Resp     Temp     Ht Rate     Temp 2           Electronically Signed By: Nemesio Lewis MD, 2020, 2:22 PM

## 2020-06-22 NOTE — ANESTHESIA PREPROCEDURE EVALUATION
"Anesthesia Pre-Procedure Evaluation    Patient: Holly Muir   MRN:     [id number] Gender:   female   Age:    65 year old :      1955        Preoperative Diagnosis: Fecal urgency [R15.2]   Procedure(s):  INSERTION, SACRAL NERVE STIMULATOR, STAGE 2 (replacement of interstim battery)     LABS:  CBC:   Lab Results   Component Value Date    WBC 6.8 2020    WBC 5.7 2019    HGB 11.6 (L) 2020    HGB 11.8 2019    HCT 36.3 2020    HCT 36.9 2019     2020     2019     BMP:   Lab Results   Component Value Date     2020     2019    POTASSIUM 4.0 2020    POTASSIUM 3.9 2019    CHLORIDE 104 2020    CHLORIDE 103 2019    CO2 26 2020    CO2 29 2019    BUN 12 2020    BUN 12 2019    CR 0.89 2020    CR 0.72 2019     (H) 2020     (H) 2019     COAGS: No results found for: PTT, INR, FIBR  POC:   Lab Results   Component Value Date     (H) 2020     OTHER:   Lab Results   Component Value Date    A1C 9.5 (H) 2020    NICOLÁS 9.2 2020    PHOS 4.8 (H) 2015    ALBUMIN 3.6 2016    PROTTOTAL 7.6 2016    ALT 58 (H) 2016    AST 29 2016    ALKPHOS 101 2016    BILITOTAL 0.3 2016    TSH 5.29 (H) 2018    T4 1.08 2018        Preop Vitals    BP Readings from Last 3 Encounters:   20 136/69   20 125/78   20 126/62    Pulse Readings from Last 3 Encounters:   20 72   20 72   20 75      Resp Readings from Last 3 Encounters:   20 20   10/24/19 16   19 16    SpO2 Readings from Last 3 Encounters:   20 98%   20 99%   20 100%      Temp Readings from Last 1 Encounters:   20 36.6  C (97.9  F) (Temporal)    Ht Readings from Last 1 Encounters:   20 1.575 m (5' 2\")      Wt Readings from Last 1 Encounters:   20 97 kg (213 lb 14.4 oz)    " "Estimated body mass index is 39.12 kg/m  as calculated from the following:    Height as of 6/18/20: 1.575 m (5' 2\").    Weight as of 6/18/20: 97 kg (213 lb 14.4 oz).     LDA:  Peripheral IV 03/10/15 Right Hand (Active)   Number of days: 1931       Peripheral IV 06/22/20 Left Upper forearm (Active)   Site Assessment WDL 06/22/20 1019   Line Status Infusing 06/22/20 1019   Phlebitis Scale 0-->no symptoms 06/22/20 1019   Dressing Intervention New dressing  06/22/20 1019   Number of days: 0        Past Medical History:   Diagnosis Date     Bilateral knee pain      Cataracts, both eyes 5/8/2013     Cervical cancer (H)      Diabetes      Diabetic retinopathy (H)      HTN      Inflammatory arthritis      Major depression      Memory loss      Nephropathy      OA (osteoarthritis) of knee 9/11/2013     Other and unspecified hyperlipidemia      Pterygium eye      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      Past Surgical History:   Procedure Laterality Date     ARTHROSCOPY KNEE RT/LT      LT     BLEPHAROPLASTY BILATERAL Bilateral 8/16/2019    Procedure: BILATERAL UPPER LID BLEPHAROPLASTY;  Surgeon: Randolph Cook MD;  Location: [facility]     BREAST LUMPECTOMY, RT/LT      LT-benign      C EXCIS PTERYGIUM  10/08    [facility]     C STOMACH SURGERY PROCEDURE UNLISTED       CATARACT IOL, RT/LT       COLONOSCOPY  5/10/2012    Procedure:COLONOSCOPY; screening colonoscopy; Surgeon:MILLA VEGA; Location:[facility]     COLONOSCOPY WITH CO2 INSUFFLATION N/A 8/2/2019    Procedure: Colonoscopy with CO2 insufflation;  Surgeon: Himanshu Hi MD;  Location: [facility]     COLOSTOMY  2000     COLOSTOMY       HC COLONOSCOPY THRU STOMA, DIAGNOSTIC  2002    normal per report-no records available-records destroyed     HYSTERECTOMY, PAP NO LONGER INDICATED      done in [place]-cervical cancer     IMPLANT STIMULATOR SACRAL NERVE STAGE ONE Right 3/10/2015    Procedure: IMPLANT STIMULATOR SACRAL NERVE STAGE ONE;  Surgeon: Madelin" Teodora TORREZ MD;  [facility]     IMPLANT STIMULATOR SACRAL NERVE STAGE TWO Right 3/31/2015    Procedure: IMPLANT STIMULATOR SACRAL NERVE STAGE TWO;  Surgeon: Teodora Yusuf MD;  [facility]     PHACOEMULSIFICATION WITH STANDARD INTRAOCULAR LENS IMPLANT Left 9/26/2019    Procedure: LEFT PHACOEMULSIFICATION, CATARACT, WITH STANDARD IOL INSERTION;  Surgeon: Francesco Winn MD;  Location: [facility]     PHACOEMULSIFICATION WITH STANDARD INTRAOCULAR LENS IMPLANT Right 10/24/2019    Procedure: RIGHT PHACOEMULSIFICATION, CATARACT, WITH STANDARD IOL INSERTION;  Surgeon: Francesco Winn MD;  Location: [facility]     REPAIR PTOSIS Bilateral 08/16/2019     SALPINGO OOPHORECTOMY,R/L/JENNIFER      Salpingo Oophorectomy, RT/LT/JENNIFER      Allergies   Allergen Reactions     Morphine Sulfate Itching        Anesthesia Evaluation     .             ROS/MED HX    ENT/Pulmonary:  - neg pulmonary ROS     Neurologic:  - neg neurologic ROS     Cardiovascular:  - neg cardiovascular ROS   (+) Dyslipidemia, hypertension----. : . . . :. .       METS/Exercise Tolerance:     Hematologic:  - neg hematologic  ROS       Musculoskeletal:  - neg musculoskeletal ROS (+) arthritis,  -       GI/Hepatic:  - neg GI/hepatic ROS   (+) GERD       Renal/Genitourinary:  - ROS Renal section negative       Endo:  - neg endo ROS   (+) type II DM Obesity, .      Psychiatric:  - neg psychiatric ROS   (+) psychiatric history schizophrenia      Infectious Disease:  - neg infectious disease ROS       Malignancy:   (+) Malignancy History of Other  Other CA cervical Remission status post Surgery      - no malignancy   Other:    - neg other ROS                     PHYSICAL EXAM:   Mental Status/Neuro: A/A/O   Airway: Facies: Feasible  Mallampati: I  Mouth/Opening: Full  TM distance: > 6 cm  Neck ROM: Full   Respiratory: Auscultation: CTAB     Resp. Rate: Normal     Resp. Effort: Normal      CV: Rhythm: Regular  Rate: Age appropriate  Heart: Normal  Sounds  Edema: None   Comments:      Dental: Normal Dentition                Assessment:   ASA SCORE: 3    H&P: History and physical reviewed and following examination; no interval change.   Smoking Status:  Non-Smoker/Unknown   NPO Status: NPO Appropriate     Plan:   Anes. Type:  MAC   Pre-Medication: None   Induction:  N/a   Airway: Native Airway   Access/Monitoring: PIV   Maintenance: Propofol Sedation     Postop Plan:   Postop Pain: None  Postop Sedation/Airway: Not planned  Disposition: Outpatient     PONV Management:   Adult Risk Factors: Female, Non-Smoker   Prevention: Ondansetron, Propofol     CONSENT: Direct conversation   Plan and risks discussed with: Patient   Blood Products: Consent Deferred (Minimal Blood Loss)                   Nemesio Lewis MD

## 2020-06-22 NOTE — OP NOTE
Urology Operative Summary    Pre-operative diagnosis: Urinary urgency and frequency and urge incontinence   Post-operative diagnosis: Same   Procedure: Stage II Interstim (IPG battery replacement)       Surgeon: Teodora Yusuf MD   Assistant(s): none      Anesthesia: Local anesthesia with MAC sedation     Estimated blood loss: Less than 10 ml   Complications: None   Condition: Patient taken to recovery in stable condition.     Findings: Replaced IPG in the right buttocks.     Indications: Holly Muir is a 65 year old woman with urinary urgency and frequency and urge incontinence refractory to anti-cholinergics. She had an interstim in place but it had not been working for a while and was found to have a depleted battery after interrogation.  She was presented with options and elected to replace the battery.    Procedure: The patient was identified correctly, consented and placed in the prone position. The patient's sacral area was prepped and draped in the usual sterile fashion. Marcaine was injected at the previous battery site and the scar was marked.  An incision was then made down to the level of the IPG and it was removed by unscrewing the lead.  A new battery was placed and an impedence check was made.The wound was copiously irrigated. An impedence check was performed and she passed.  We then closed venu's with interrupted vicryl stitches, closed the deep dermis with a running vicryl stitch and then reapproximated the skin with dermabond. A small island dressing was then placed over it. The patient was awoken from anesthesia and returned to the supine position. All instrument and counts were correct at the end of the procedure.    Plan: Holly Muir is a 65 year old woman who underwent interstim IPG replacement.  She will follow up for a wound check in clinic in two weeks.    Teodora Yusuf MD  June 22, 2020

## 2020-06-22 NOTE — DISCHARGE INSTRUCTIONS
[facility]   Adult Discharge Orders & Instructions     For 24 hours after surgery    1. Get plenty of rest.  A responsible adult must stay with you for at least 24 hours after you leave the hospital.   2. Do not drive or use heavy equipment.  If you have weakness or tingling, don't drive or use heavy equipment until this feeling goes away.  3. Do not drink alcohol.  4. Avoid strenuous or risky activities.  Ask for help when climbing stairs.   5. You may feel lightheaded.  IF so, sit for a few minutes before standing.  Have someone help you get up.   6. If you have nausea (feel sick to your stomach): Drink only clear liquids such as apple juice, ginger ale, broth or 7-Up.  Rest may also help.  Be sure to drink enough fluids.  Move to a regular diet as you feel able.  7. You may have a slight fever. Call the doctor if your fever is over 100 F (37.7 C) (taken under the tongue) or lasts longer than 24 hours.  8. You may have a dry mouth, a sore throat, muscle aches or trouble sleeping.  These should go away after 24 hours.  9. Do not make important or legal decisions.     Call your doctor for any of the followin.  Signs of infection (fever, growing tenderness at the surgery site, a large amount of drainage or bleeding, severe pain, foul-smelling drainage, redness, swelling).    2. It has been over 8 to 10 hours since surgery and you are still not able to urinate (pass water).    3.  Headache for over 24 hours.                  4. Signs of Covid-19 infection (temperature over 100 degrees, shortness of breath, cough, loss of taste/smell, generalized body aches, persistent headache,                  chills, sore throat, nausea/vomiting/diarrhea).    To contact a doctor, call  [phone]      **Acetaminophen (Tylenol)  975mg taken at 10:20am.     **Oxycodone pain pill taken at 1:45pm.

## 2020-06-22 NOTE — ANESTHESIA CARE TRANSFER NOTE
Patient: Holly Muir    Procedure(s):  INSERTION, SACRAL NERVE STIMULATOR, STAGE 2 (replacement of interstim battery)    Diagnosis: Fecal urgency [R15.2]  Diagnosis Additional Information: No value filed.    Anesthesia Type:   MAC     Note:  Anesthesia Care Transfer Notewriter    Vitals: (Last set prior to Anesthesia Care Transfer)    CRNA VITALS  6/22/2020 1248 - 6/22/2020 1323      6/22/2020             Pulse:  94    SpO2:  97 %          Awake, comfortable, sats 100%< Report to RN.      Electronically Signed By: KASIE Cardona CRNA  June 22, 2020  1:23 PM

## 2020-06-23 LAB — PAIN DRUG SCR UR W RPTD MEDS: NORMAL

## 2020-06-23 NOTE — TELEPHONE ENCOUNTER
6/23 Called and spoke to patient. She is currently scheduled 6/30.     Swathi Degroot   Procedure    Ortho/Sports Med/Ent/Eye   [facility]   [phone]

## 2020-06-24 DIAGNOSIS — R25.1 TREMOR: ICD-10-CM

## 2020-06-26 RX ORDER — PROPRANOLOL HCL 60 MG
60 CAPSULE, EXTENDED RELEASE 24HR ORAL DAILY
Qty: 90 CAPSULE | Refills: 3 | Status: SHIPPED | OUTPATIENT
Start: 2020-06-26 | End: 2020-09-02

## 2020-06-26 NOTE — TELEPHONE ENCOUNTER
6/18/2020  [facility]   Tiffany King MD PhD   Internal Medicine    propranolol ER (INDERAL LA) 60 MG 24 hr capsule    high medium med alert      Warnings Override History for propranolol ER (INDERAL LA) 60 MG 24 hr capsule [[id number]]     Overridden by Teodora Yusuf MD on Jun 22, 2020 1:33 PM    Drug-Drug    1. BETA-ADRENERGIC BLOCKERS(NONSELECTIVE) / INSULIN [Level: Moderate]    Other Orders:  insulin glargine (LANTUS SOLOSTAR) 100 UNIT/ML soln       2. BETA-ADRENERGIC BLOCKERS(NONSELECTIVE) / INSULIN [Level: Moderate]    Other Orders:  insulin aspart (NOVOLOG VIAL) 100 UNITS/ML injection       3. BETA-ADRENERGIC BLOCKERS(NONSELECTIVE) / INSULIN [Level: Moderate]    Other Orders:  insulin aspart (NOVOLOG FLEXPEN) 100 UNIT/ML pen          Overridden by Tiffany King MD PhD on Jun 27, 2019 3:02 PM    Drug-Drug    1. BETA-ADRENERGIC BLOCKERS / SYMPATHOMIMETICS [Level: Major] [Reason: Will monitor drug levels/drug effects ]    Other Orders:  albuterol (PROAIR HFA/PROVENTIL HFA/VENTOLIN HFA) 108 (90 Base) MCG/ACT inhaler

## 2020-06-30 ENCOUNTER — ANCILLARY PROCEDURE (OUTPATIENT)
Dept: CT IMAGING | Facility: CLINIC | Age: 65
End: 2020-06-30
Attending: FAMILY MEDICINE
Payer: MEDICARE

## 2020-06-30 DIAGNOSIS — G89.29 CHRONIC BILATERAL LOW BACK PAIN WITHOUT SCIATICA: ICD-10-CM

## 2020-06-30 DIAGNOSIS — M54.50 CHRONIC BILATERAL LOW BACK PAIN WITHOUT SCIATICA: ICD-10-CM

## 2020-06-30 PROCEDURE — 72131 CT LUMBAR SPINE W/O DYE: CPT | Performed by: RADIOLOGY

## 2020-07-10 ENCOUNTER — TELEPHONE (OUTPATIENT)
Dept: PEDIATRICS | Facility: CLINIC | Age: 65
End: 2020-07-10

## 2020-07-10 ENCOUNTER — TELEPHONE (OUTPATIENT)
Dept: ENDOCRINOLOGY | Facility: CLINIC | Age: 65
End: 2020-07-10

## 2020-07-10 NOTE — TELEPHONE ENCOUNTER
Spoke with pt regarding urine drug screen positive for marijuana.  Patient reported that she did buy marijuana to try couple weeks ago around the time of the urine drug screen.  Reported that her back pain was not controlled by tramadol she wanted to see if it helps.  She is not on medical marijuana.  Reported that she does not have anymore.  And she does not smoke marijuana regularly.    I informed the patient that nonmedical marijuana is still illegal in the [place].  Controlled medication agreement stated that she cannot smoke marijuana while on controlled medications.  Patient reported that she would not do that again.    Patient is on very small dose of tramadol 25 mg 3 times a day.    She will be going to [place] to visit her mom who is ill.  She wanted to be there in case she passes away.  She plans to return sometime in late August.    I will have patient follow-up with me in September.  We will plan to repeat a urine drug screen at that time.  If she has marijuana a second time, I can no longer continue to prescribe tramadol for her.  Patient is aware.

## 2020-07-10 NOTE — RESULT ENCOUNTER NOTE
Dear Holly,   Your recent test result are within acceptable range or at baseline. Please continue with your current plan of care.       Please call or Mychart to our office if you have further questions.     Tiffany King MD-PhD

## 2020-07-10 NOTE — TELEPHONE ENCOUNTER
I spoke with patient to schedule her 3 month follow up appointment with Mikayla Sellers Diabetes Educator along with a 6 month follow up appointment with Dr Medrano. Holly scheduled the 6 month follow up with Dr Medrano, but said that she will have to call back to schedule the 3 month follow up with Mikayla Sellers since she is not sure when she will be back from [place].       Hamilton Romero  Procedure , [place]  [facility]

## 2020-07-10 NOTE — RESULT ENCOUNTER NOTE
Dear Holly,   Your recent lab results showed the following:  -- electrolyte panel was normal at baseline  -- A1c has gotten a lot higher. Please follow up with Dr Medrano's office for this.  -- blood count showed trace anemia. We'll recheck this at your next appointment.     Please call or Mychart to our office if you have further questions.     Tiffany King MD-PhD

## 2020-07-13 ENCOUNTER — VIRTUAL VISIT (OUTPATIENT)
Dept: UROLOGY | Facility: CLINIC | Age: 65
End: 2020-07-13
Payer: MEDICARE

## 2020-07-13 DIAGNOSIS — R33.9 URINARY RETENTION: ICD-10-CM

## 2020-07-13 DIAGNOSIS — R39.15 URINARY URGENCY: Primary | ICD-10-CM

## 2020-07-13 PROCEDURE — 99441 ZZC PHYSICIAN TELEPHONE EVALUATION 5-10 MIN: CPT | Performed by: UROLOGY

## 2020-07-13 NOTE — PROGRESS NOTES
"Holly Muir is a 65 year old female who is being evaluated via a billable telephone visit.      The patient has been notified of following:     \"This telephone visit will be conducted via a call between you and your physician/provider. We have found that certain health care needs can be provided without the need for a physical exam.  This service lets us provide the care you need with a short phone conversation.  If a prescription is necessary we can send it directly to your pharmacy.  If lab work is needed we can place an order for that and you can then stop by our lab to have the test done at a later time.    Telephone visits are billed at different rates depending on your insurance coverage. During this emergency period, for some insurers they may be billed the same as an in-person visit.  Please reach out to your insurance provider with any questions.    If during the course of the call the physician/provider feels a telephone visit is not appropriate, you will not be charged for this service.\"    Patient has given verbal consent for Telephone visit?  Yes    What phone number would you like to be contacted at? [phone]    How would you like to obtain your AVS? Dinesh Mi LPN on 7/13/2020 at 9:51 AM    "

## 2020-07-13 NOTE — PROGRESS NOTES
Reason for Visit:  Removal of interstim device.    Clinical Data: Ms. Muir is a 65 year old female with hx of urgency and urinary retention who voids by crede is wondering about interstim to possibly see if it brianna help her void better. The patient then underwent the stage I procedure on 3/10/15 and was very happy. She states that she has had 100% improvement in her symptoms. After final implant she last reported no incontinence in 2015.    She underwent replacement of her interstim battery.  She is at setting 0.7 but doesn't know what program.    She reports no problems with the device or the incision.  She is voiding better and has very little leakage and is pleased.    Now, the patient thinks that the Interstim device is causing back pain as it started after the stage 2 implant was placed. She would like the device removed since it is not effective and causing pain. She states that the device is turned on, but she feels no stimulation in the vaginal area or elsewhere. She cannot turn the device off because she lost the remote a few months ago. [employer] is supposedly sending her a new remote.  She denies any urinary issues.      A/P: 66 y/o female doing well from a urinary standpoint with interstim replacement.  -f/u prn    Thank you for allowing me to participate in the care of Ms. Holly Muir and I will keep you updated on her progress.    Teodora Yusuf MD    4 min spent with patient on the phone

## 2020-07-14 ENCOUNTER — TRANSFERRED RECORDS (OUTPATIENT)
Dept: HEALTH INFORMATION MANAGEMENT | Facility: CLINIC | Age: 65
End: 2020-07-14

## 2020-07-15 ENCOUNTER — TELEPHONE (OUTPATIENT)
Dept: PEDIATRICS | Facility: CLINIC | Age: 65
End: 2020-07-15

## 2020-07-15 NOTE — TELEPHONE ENCOUNTER
[facility] utilizes an encounter to take the place of a direct phone call to your office. Please take a moment to review the below request. Please reply or route message to author of this encounter.  Message will act as a verbal OK of orders requested below. Thank you.    ORDER    Requesting to place orders on hold and need for order for late recert.    Client left out of [place] for approx one month to care for her ill mother.  She has no anticipated return date as of today.  She thinks it will be approx one month.  Current certification ends 8/11/2020.  She thinks she will be back by 8/15/2020.  Is it ok to place orders on hold and do a late recertification when client returns home.    Thank you,     EVELYN Orellana  [phone]  [email or web address]

## 2020-07-15 NOTE — TELEPHONE ENCOUNTER
Left message for patient to return clinic call regarding scheduling. Patient needs a Return  appointment for pain with Tiffany King MD in September. Number to clinic and Mychart option given, please assist in scheduling once patient returns clinic call.     Call Center OKAY TO SCHEDULE.    Thanks,   Arline Willis  Primary Care   [facility]

## 2020-07-17 NOTE — TELEPHONE ENCOUNTER
ok to place orders on hold and do a late recertification when client returns home.    Tiffany King MD PhD

## 2020-07-19 DIAGNOSIS — Z53.9 DIAGNOSIS NOT YET DEFINED: Primary | ICD-10-CM

## 2020-07-19 PROCEDURE — G0179 MD RECERTIFICATION HHA PT: HCPCS | Performed by: INTERNAL MEDICINE

## 2020-07-23 DIAGNOSIS — E78.00 HIGH CHOLESTEROL: ICD-10-CM

## 2020-07-27 RX ORDER — SIMVASTATIN 20 MG
20 TABLET ORAL AT BEDTIME
Qty: 90 TABLET | Refills: 3 | Status: SHIPPED | OUTPATIENT
Start: 2020-07-27 | End: 2021-09-15

## 2020-08-18 ENCOUNTER — TELEPHONE (OUTPATIENT)
Dept: PEDIATRICS | Facility: CLINIC | Age: 65
End: 2020-08-18

## 2020-08-18 NOTE — TELEPHONE ENCOUNTER
[facility] utilizes an encounter to take the place of a direct phone call to your office. Please take a moment to review the below request. Please reply or route message to author of this encounter.  Message will act as a verbal OK of orders requested below. Thank you.    Dr. King,  Holly is being discharged from Home care services at this time as she is still in [place].  Her anticipated return date was to be August 15, 2020, however she has not returned as of yet.  Last home care visit was 7/14/2020.  When client returns from [place] a new referral will be needed for home care services.  Holly will call the clinic when she is home and request a referral be sent.  Thank you,  EVELYN Orellana  [phone]  [email or web address]

## 2020-08-24 DIAGNOSIS — G89.4 CHRONIC PAIN SYNDROME: ICD-10-CM

## 2020-08-24 DIAGNOSIS — Z79.4 TYPE 2 DIABETES MELLITUS WITH HYPERGLYCEMIA, WITH LONG-TERM CURRENT USE OF INSULIN (H): ICD-10-CM

## 2020-08-24 DIAGNOSIS — E11.65 TYPE 2 DIABETES MELLITUS WITH HYPERGLYCEMIA, WITH LONG-TERM CURRENT USE OF INSULIN (H): ICD-10-CM

## 2020-08-24 DIAGNOSIS — D64.9 ANEMIA, UNSPECIFIED TYPE: ICD-10-CM

## 2020-08-24 LAB
CREAT UR-MCNC: 16 MG/DL
FERRITIN SERPL-MCNC: 17 NG/ML (ref 8–252)
HBA1C MFR BLD: 10.6 % (ref 0–5.6)
IRON SATN MFR SERPL: 6 % (ref 15–46)
IRON SERPL-MCNC: 27 UG/DL (ref 35–180)
MICROALBUMIN UR-MCNC: 16 MG/L
MICROALBUMIN/CREAT UR: 100 MG/G CR (ref 0–25)
RETICS # AUTO: 79.6 10E9/L (ref 25–95)
RETICS/RBC NFR AUTO: 1.9 % (ref 0.5–2)
TIBC SERPL-MCNC: 426 UG/DL (ref 240–430)
TSH SERPL DL<=0.005 MIU/L-ACNC: 1.44 MU/L (ref 0.4–4)
VIT B12 SERPL-MCNC: 605 PG/ML (ref 193–986)

## 2020-08-24 PROCEDURE — 82728 ASSAY OF FERRITIN: CPT | Performed by: INTERNAL MEDICINE

## 2020-08-24 PROCEDURE — 85027 COMPLETE CBC AUTOMATED: CPT | Performed by: INTERNAL MEDICINE

## 2020-08-24 PROCEDURE — 82607 VITAMIN B-12: CPT | Performed by: INTERNAL MEDICINE

## 2020-08-24 PROCEDURE — 83036 HEMOGLOBIN GLYCOSYLATED A1C: CPT | Performed by: INTERNAL MEDICINE

## 2020-08-24 PROCEDURE — 84443 ASSAY THYROID STIM HORMONE: CPT | Performed by: INTERNAL MEDICINE

## 2020-08-24 PROCEDURE — 82043 UR ALBUMIN QUANTITATIVE: CPT | Performed by: INTERNAL MEDICINE

## 2020-08-24 PROCEDURE — 80048 BASIC METABOLIC PNL TOTAL CA: CPT | Performed by: INTERNAL MEDICINE

## 2020-08-24 PROCEDURE — 85045 AUTOMATED RETICULOCYTE COUNT: CPT | Performed by: INTERNAL MEDICINE

## 2020-08-24 PROCEDURE — 36415 COLL VENOUS BLD VENIPUNCTURE: CPT | Performed by: INTERNAL MEDICINE

## 2020-08-24 PROCEDURE — 80307 DRUG TEST PRSMV CHEM ANLYZR: CPT | Mod: 90 | Performed by: INTERNAL MEDICINE

## 2020-08-24 PROCEDURE — 83550 IRON BINDING TEST: CPT | Performed by: INTERNAL MEDICINE

## 2020-08-24 PROCEDURE — 83735 ASSAY OF MAGNESIUM: CPT | Performed by: INTERNAL MEDICINE

## 2020-08-24 PROCEDURE — 99000 SPECIMEN HANDLING OFFICE-LAB: CPT | Performed by: INTERNAL MEDICINE

## 2020-08-24 PROCEDURE — 83540 ASSAY OF IRON: CPT | Performed by: INTERNAL MEDICINE

## 2020-08-25 ENCOUNTER — VIRTUAL VISIT (OUTPATIENT)
Dept: PEDIATRICS | Facility: CLINIC | Age: 65
End: 2020-08-25
Payer: MEDICARE

## 2020-08-25 ENCOUNTER — TELEPHONE (OUTPATIENT)
Dept: ENDOCRINOLOGY | Facility: CLINIC | Age: 65
End: 2020-08-25

## 2020-08-25 DIAGNOSIS — E11.65 UNCONTROLLED TYPE 2 DIABETES MELLITUS WITH HYPERGLYCEMIA (H): ICD-10-CM

## 2020-08-25 DIAGNOSIS — E11.65 UNCONTROLLED TYPE 2 DIABETES MELLITUS WITH HYPERGLYCEMIA, WITH LONG-TERM CURRENT USE OF INSULIN (H): ICD-10-CM

## 2020-08-25 DIAGNOSIS — M25.562 ARTHRALGIA OF BOTH KNEES: ICD-10-CM

## 2020-08-25 DIAGNOSIS — D50.9 IRON DEFICIENCY ANEMIA, UNSPECIFIED IRON DEFICIENCY ANEMIA TYPE: ICD-10-CM

## 2020-08-25 DIAGNOSIS — M25.561 ARTHRALGIA OF BOTH KNEES: ICD-10-CM

## 2020-08-25 DIAGNOSIS — S92.352A CLOSED FRACTURE OF FIFTH METATARSAL BONE OF LEFT FOOT, PHYSEAL INVOLVEMENT UNSPECIFIED, INITIAL ENCOUNTER: ICD-10-CM

## 2020-08-25 DIAGNOSIS — Z79.4 UNCONTROLLED TYPE 2 DIABETES MELLITUS WITH HYPERGLYCEMIA, WITH LONG-TERM CURRENT USE OF INSULIN (H): ICD-10-CM

## 2020-08-25 DIAGNOSIS — G25.81 RESTLESS LEGS SYNDROME: Primary | ICD-10-CM

## 2020-08-25 DIAGNOSIS — M17.10 ARTHRITIS OF KNEE: ICD-10-CM

## 2020-08-25 LAB
ANION GAP SERPL CALCULATED.3IONS-SCNC: 8 MMOL/L (ref 3–14)
BUN SERPL-MCNC: 7 MG/DL (ref 7–30)
CALCIUM SERPL-MCNC: 8.9 MG/DL (ref 8.5–10.1)
CHLORIDE SERPL-SCNC: 103 MMOL/L (ref 94–109)
CO2 SERPL-SCNC: 24 MMOL/L (ref 20–32)
CREAT SERPL-MCNC: 0.68 MG/DL (ref 0.52–1.04)
ERYTHROCYTE [DISTWIDTH] IN BLOOD BY AUTOMATED COUNT: 15.1 % (ref 10–15)
GFR SERPL CREATININE-BSD FRML MDRD: >90 ML/MIN/{1.73_M2}
GLUCOSE SERPL-MCNC: 240 MG/DL (ref 70–99)
HCT VFR BLD AUTO: 34.9 % (ref 35–47)
HGB BLD-MCNC: 11.2 G/DL (ref 11.7–15.7)
MAGNESIUM SERPL-MCNC: 1.9 MG/DL (ref 1.6–2.3)
MCH RBC QN AUTO: 26.9 PG (ref 26.5–33)
MCHC RBC AUTO-ENTMCNC: 32.1 G/DL (ref 31.5–36.5)
MCV RBC AUTO: 84 FL (ref 78–100)
PLATELET # BLD AUTO: 372 10E9/L (ref 150–450)
POTASSIUM SERPL-SCNC: 3.8 MMOL/L (ref 3.4–5.3)
RBC # BLD AUTO: 4.16 10E12/L (ref 3.8–5.2)
SODIUM SERPL-SCNC: 135 MMOL/L (ref 133–144)
WBC # BLD AUTO: 6 10E9/L (ref 4–11)

## 2020-08-25 PROCEDURE — 99443 ZZC PHYSICIAN TELEPHONE EVALUATION 21-30 MIN: CPT | Performed by: INTERNAL MEDICINE

## 2020-08-25 RX ORDER — GABAPENTIN 600 MG/1
TABLET ORAL
Qty: 315 TABLET | Refills: 1 | COMMUNITY
Start: 2020-08-25 | End: 2020-09-02

## 2020-08-25 NOTE — PROGRESS NOTES
"Holly Muir is a 65 year old female who is being evaluated via a billable telephone visit.      The patient has been notified of following:     \"This telephone visit will be conducted via a call between you and your physician/provider. We have found that certain health care needs can be provided without the need for a physical exam.  This service lets us provide the care you need with a short phone conversation.  If a prescription is necessary we can send it directly to your pharmacy.  If lab work is needed we can place an order for that and you can then stop by our lab to have the test done at a later time.    Telephone visits are billed at different rates depending on your insurance coverage. During this emergency period, for some insurers they may be billed the same as an in-person visit.  Please reach out to your insurance provider with any questions.    If during the course of the call the physician/provider feels a telephone visit is not appropriate, you will not be charged for this service.\"    Patient has given verbal consent for Telephone visit?  Yes    What phone number would you like to be contacted at? [phone]    How would you like to obtain your AVS? Mail a copy    Subjective     Holly Muir is a 65 year old female who presents via phone visit today for the following health issues:    HPI    Follow up on blood work.   Question on gabapentin, legs \"jump a lot\"    When she goes to bed, her legs jump a lot, has trouble sleeping. Started a month ago. interupts her sleep.     Poor diet. Sometimes skipping breakfast and lunch. Often times one meal a day.     Tramadol 1/2 tab tid didn't help.   Right foot/ankle swelling on and off since she was in [place]. Left ankle normally more swollen.     [facility] [place] office. [place], home care recertification.    Patient had labs done recently. Showing iron deficiency anemia. Pt denied stomach pain, black tarry stool, or blood in the " stool. Had colonoscopy just last year, had polyp, on every 5 year schedule. Does report poor diet.     ROS:  Constitutional, HEENT, cardiovascular, pulmonary, gi and gu systems are negative, except as otherwise noted.       ARIPiprazole (ABILIFY) 30 MG tablet, Take 1 tablet (30 mg) by mouth daily  ARIPiprazole (ABILIFY) 5 MG tablet, Take 1 tablet (5 mg) by mouth At Bedtime Along with 5mg tab to total 25mg.  Prescribed by Dr. Isael Jiménez at [facility]  blood glucose (ACCU-CHEK CELIA) test strip, Use to test blood sugars 3 times daily or as directed.  blood glucose monitoring (ACCU-CHEK CELIA PLUS) meter device kit, Use to test blood sugars 4 times daily or as directed.  blood glucose monitoring (ACCU-CHEK FASTCLIX) lancets, Use to test blood sugar 6 times daily or as directed.  buPROPion (WELLBUTRIN XL) 300 MG 24 hr tablet, TAKE 1 TABLET EVERY DAY. (TOTAL DAILY DOSE = 300MG)  busPIRone HCl (BUSPAR) 30 MG tablet, TAKE 1 TABLET BY MOUTH TWICE A DAY  cetirizine (ZYRTEC) 10 MG tablet, Take 1 tablet (10 mg) by mouth daily  [facility] ASPIRIN ADULT LOW DOSE 81 MG chewable tablet, TAKE 1 TABLET BY MOUTH EVERY DAY  [facility] PAIN RELIEF 500 MG tablet, TAKE 2 TABLETS BY MOUTH EVERY 8 HOURS. MAX ACETAMINOPHEN DOSE: 4000MG IN 24 HRS.  gabapentin (NEURONTIN) 600 MG tablet, Take 1 tablet (600 mg) by mouth 2 times daily AND 1.5 tablets (900 mg) every evening.  ibuprofen (ADVIL/MOTRIN) 400 MG tablet, Take 1 tablet (400 mg) by mouth every 8 hours as needed for moderate pain (with food)  insulin aspart (NOVOLOG FLEXPEN) 100 UNIT/ML pen, 15 UNIT SUBCUTANEOUS PER MEAL THREE TIMES A DAY.  insulin glargine (BASAGLAR KWIKPEN) 100 UNIT/ML pen, Take 55 units daily.  insulin pen needle 31G X 5 MM, Use four times 4 day times a day  Multiple Vitamin (DAILY-ISAAC) TABS, TAKE 1 TABLET BY MOUTH DAILY  nystatin (MYCOSTATIN) 307236 UNIT/GM external powder, Apply 1 dose topically 3 times daily as needed  order for DME, Equipment being ordered: grab  bar for bath tub/shower.  order for DME, Equipment being ordered: railing for stairs at home.  order for DME, Equipment being ordered: Knee walker   order for DME, by Device route continuous Air walker -  Use as instructed  ORDER FOR DME, Equipment being ordered:  Ostomy supplies.  Drain Pouch by Grace #.    Also needs Barrier by Grace #.  ORDER FOR DME, Equipment being ordered: Cane  PARoxetine (PAXIL) 20 MG tablet, TAKE 2 TABLETS AT BEDTIME (Prescribed by Dr. Isael [facility] at [facility])  pioglitazone (ACTOS) 45 MG tablet, Take 1 tablet (45 mg) by mouth daily  propranolol ER (INDERAL LA) 60 MG 24 hr capsule, Take 1 capsule (60 mg) by mouth daily  ramipril (ALTACE) 5 MG capsule, Take 1 capsule (5 mg) by mouth daily  salicylic acid (MEDIPLAST) 40 % miscellaneous, Apply to warts nightly as tolerated.  simvastatin (ZOCOR) 20 MG tablet, Take 1 tablet (20 mg) by mouth At Bedtime  traZODone (DESYREL) 50 MG tablet, Take 1.5 tablets (75 mg) by mouth At Bedtime  UNIVERSAL REMOVER WIPES EX MISC, uses to remove barrier from colostomy bag at time of change  vitamin D3 (CHOLECALCIFEROL) 2000 units (50 mcg) tablet, TAKE 1 TABLET BY MOUTH DAILY  albuterol (PROAIR HFA/PROVENTIL HFA/VENTOLIN HFA) 108 (90 Base) MCG/ACT inhaler, Inhale 2 puffs into the lungs every 6 hours (Patient not taking: Reported on 12/10/2019)  buPROPion (WELLBUTRIN SR) 150 MG 12 hr tablet, Take 1 tablet (150 mg) by mouth 2 times daily  HYDROcodone-acetaminophen (NORCO) 5-325 MG tablet, Take 1-2 tablets by mouth every 4 hours as needed for moderate to severe pain (Patient not taking: Reported on 7/13/2020)    triamcinolone (KENALOG-40) injection 40 mg  triamcinolone (KENALOG-40) injection 40 mg  triamcinolone (KENALOG-40) injection 40 mg  triamcinolone (KENALOG-40) injection 40 mg  triamcinolone (KENALOG-40) injection 40 mg           Patient Active Problem List   Diagnosis     Hyperlipidemia LDL goal <100     Severe major  depression with psychotic features (H)     History of cervical cancer     S/P colostomy (H)     Mixed incontinence urge and stress     Memory loss     Type 2 diabetes mellitus, uncontrolled A1C goal <8%     Obesity     Pain in joint, hand     Trigger finger, acquired     Synovitis and tenosynovitis     Dermatochalasis     Presbyopia     OCD (obsessive compulsive disorder)     HTN, goal below 140/90     Cataracts, both eyes     Health Care Home     Lumbago     Sprain of lumbar region     Generalized osteoarthrosis, unspecified site     Rotator cuff impingement syndrome     Hypoglycemia due to insulin     Encounter for long-term (current) use of insulin (H)     Colostomy, evaluate (H)     Urinary retention     ACP (advance care planning)     Bilateral low back pain without sciatica     Anxiety     Fecal incontinence due to anorectal disorder     Visual hallucination     Chronic pain syndrome     Obesity (BMI 35.0-39.9) with comorbidity (H)     Anal stenosis     S/P partial colectomy     Anemia     Chest pain, musculoskeletal     Diabetic neuropathy (H)     GERD (gastroesophageal reflux disease)     Hyponatremia     Schizoaffective disorder (H)     Pseudophakia of right eye     Fecal urgency     Past Surgical History:   Procedure Laterality Date     ARTHROSCOPY KNEE RT/LT      LT     BLEPHAROPLASTY BILATERAL Bilateral 8/16/2019    Procedure: BILATERAL UPPER LID BLEPHAROPLASTY;  Surgeon: Randolph Cook MD;  Location: [facility] OR     BREAST LUMPECTOMY, RT/LT      LT-benign      C EXCIS PTERYGIUM  10/08    [facility]     C STOMACH SURGERY PROCEDURE UNLISTED       CATARACT IOL, RT/LT       COLONOSCOPY  5/10/2012    Procedure:COLONOSCOPY; screening colonoscopy; Surgeon:MILLA VEGA; Location:[facility] OR     COLONOSCOPY WITH CO2 INSUFFLATION N/A 8/2/2019    Procedure: Colonoscopy with CO2 insufflation;  Surgeon: Himanshu Hi MD;  Location: [facility] OR     COLOSTOMY  2000     COLOSTOMY       [facility] COLONOSCOPY THRU  STOMA, DIAGNOSTIC  2002    normal per report-no records available-records destroyed     HYSTERECTOMY, PAP NO LONGER INDICATED      done in [place]-cervical cancer     IMPLANT STIMULATOR SACRAL NERVE STAGE ONE Right 3/10/2015    Procedure: IMPLANT STIMULATOR SACRAL NERVE STAGE ONE;  Surgeon: Teodora Yusuf MD;  [facility]     IMPLANT STIMULATOR SACRAL NERVE STAGE TWO Right 3/31/2015    Procedure: IMPLANT STIMULATOR SACRAL NERVE STAGE TWO;  Surgeon: Teodora Yusuf MD;  [facility]     IMPLANT STIMULATOR SACRAL NERVE STAGE TWO Right 2020    Procedure: INSERTION, SACRAL NERVE STIMULATOR, STAGE 2 (replacement of interstim battery);  Surgeon: Teodora Yusuf MD;  Location: [facility] OR     PHACOEMULSIFICATION WITH STANDARD INTRAOCULAR LENS IMPLANT Left 2019    Procedure: LEFT PHACOEMULSIFICATION, CATARACT, WITH STANDARD IOL INSERTION;  Surgeon: Francesco Winn MD;  Location: [facility]     PHACOEMULSIFICATION WITH STANDARD INTRAOCULAR LENS IMPLANT Right 10/24/2019    Procedure: RIGHT PHACOEMULSIFICATION, CATARACT, WITH STANDARD IOL INSERTION;  Surgeon: Francesco Winn MD;  Location: [facility] OR     REPAIR PTOSIS Bilateral 2019     SALPINGO OOPHORECTOMY,R/L/JENNIFER      Salpingo Oophorectomy, RT/LT/JENNIFER       Social History     Tobacco Use     Smoking status: Former Smoker     Packs/day: 0.00     Last attempt to quit: 2016     Years since quittin.0     Smokeless tobacco: Never Used   Substance Use Topics     Alcohol use: Yes     Alcohol/week: 0.0 standard drinks     Comment: social occasions     Family History   Problem Relation Age of Onset     Diabetes Mother      Cerebrovascular Disease Mother      Diabetes Father      Hypertension Father      Cancer Maternal Grandfather      Cancer Paternal Grandfather      Diabetes Brother      Diabetes Sister      Thyroid Disease Daughter      Thyroid Disease Sister      Multiple Sclerosis Daughter      Glaucoma No family hx of      Macular  Degeneration No family hx of              Problem list, Medication list, Allergies, and Medical/Social/Surgical histories reviewed in [facility] and updated as appropriate.    OBJECTIVE:                                                    Vitals not obtained.  GENERAL: pleasant alert and no acute distress in conversation    Component      Latest Ref Rng & Units 8/24/2020   Sodium      133 - 144 mmol/L 135   Potassium      3.4 - 5.3 mmol/L 3.8   Chloride      94 - 109 mmol/L 103   Carbon Dioxide      20 - 32 mmol/L 24   Anion Gap      3 - 14 mmol/L 8   Glucose      70 - 99 mg/dL 240 (H)   Urea Nitrogen      7 - 30 mg/dL 7   Creatinine      0.52 - 1.04 mg/dL 0.68   GFR Estimate      >60 mL/min/1.73:m2 >90   GFR Estimate If Black      >60 mL/min/1.73:m2 >90   Calcium      8.5 - 10.1 mg/dL 8.9   WBC      4.0 - 11.0 10e9/L 6.0   RBC Count      3.8 - 5.2 10e12/L 4.16   Hemoglobin      11.7 - 15.7 g/dL 11.2 (L)   Hematocrit      35.0 - 47.0 % 34.9 (L)   MCV      78 - 100 fl 84   MCH      26.5 - 33.0 pg 26.9   MCHC      31.5 - 36.5 g/dL 32.1   RDW      10.0 - 15.0 % 15.1 (H)   Platelet Count      150 - 450 10e9/L 372   Iron      35 - 180 ug/dL 27 (L)   Iron Binding Cap      240 - 430 ug/dL 426   Iron Saturation Index      15 - 46 % 6 (L)   % Retic      0.5 - 2.0 % 1.9   Absolute Retic      25 - 95 10e9/L 79.6   TSH      0.40 - 4.00 mU/L 1.44   Vitamin B12      193 - 986 pg/mL 605   Ferritin      8 - 252 ng/mL 17   Hemoglobin A1C      0 - 5.6 % 10.6 (H)   Magnesium      1.6 - 2.3 mg/dL 1.9        ASSESSMENT/PLAN:                                                      65 year old female with the following diagnoses and treatment plan:      ICD-10-CM    1. Restless legs syndrome  G25.81 Basic metabolic panel     Magnesium   2. Iron deficiency anemia, unspecified iron deficiency anemia type  D50.9 CBC with platelets     Occult blood stool   3. Arthritis of knee  M17.10 gabapentin (NEURONTIN) 600 MG tablet   4. Arthralgia of both knees   M25.561 gabapentin (NEURONTIN) 600 MG tablet    M25.562    5. Closed fracture of fifth metatarsal bone of left foot, physeal involvement unspecified, initial encounter  S92.352A gabapentin (NEURONTIN) 600 MG tablet   6. Uncontrolled type 2 diabetes mellitus with hyperglycemia (H)  E11.65        -- restless leg syndrome vs leg cramps: check electrolytes and Magnesium. Known history of iron deficiency anemia. Ferritin low end of normal, may be contributing. She is on gabapentin for chronic pain. Will increase evening dose to see if this helps.  -- iron deficiency anemia: start with iron rich foods. Hemoccult test. Will start iron replacement after the hemoccult test.   -- chronic pain: pt reported tramadol 1/2 tab is not enough but helps some. Previously pt had marijuana in urine drug screen. Pt reported only one time use. She gave urine sample for recheck yesterday. Result pending. I recommend we work on maximize gabapentin first before increasing Tramadol. She is also followed with sports medicine, may be getting injection therapy. Pain level may decrease overall.   -- diabetes: poor control, gotten worse. She sees endocrine for this. I will forward result to Dr. Medrano's office.     See Patient Instructions on After Visit Summary.    Patient will follow up if worsening or symptoms not improving as discussed.    Type of Service: Phone visit  Phone call duration: 26 minutes    Tiffany King MD-PhD  [facility]     (Note: Chart documentation was done in part with Dragon Voice Recognition software. Although reviewed after completion, some word and grammatical errors may remain.)

## 2020-08-25 NOTE — TELEPHONE ENCOUNTER
Chart reviewed. Refill completed.       Anahy Bloom, RN  Endocrine Care Coordinator  [facility]

## 2020-08-25 NOTE — RESULT ENCOUNTER NOTE
Dr. Medrano,  Here is Holly's A1c when she came for other labs. Could your office reach out to her regarding your recommendations? Thanks!  Tiffany

## 2020-08-25 NOTE — RESULT ENCOUNTER NOTE
Results discussed directly with patient while patient was present. Any further details documented in the note.   Tiffany King MD PhD

## 2020-08-25 NOTE — PATIENT INSTRUCTIONS
Make appointment(s) for:   --  stool testing kits in the lab and send back.    See diet info to increase iron rich foods.     Here is information from [facility] web site regarding increase iron intake through foods.     [email or web address]/health/[email or web address]-[email or web address]    Prevention  By [facility] staff   You can reduce your risk of iron deficiency anemia by choosing iron-rich foods.   Choose iron-rich foods   Foods rich in iron include:   Beans   Dark green leafy vegetables, such as spinach   Dried fruit, such as raisins and apricots   Eggs   Iron-fortified cereals, breads and pastas   Peas   Pork   Poultry   Red meat   Seafood   Your body absorbs more iron from meat than it does from other sources. If you choose to not eat meat, you may need to increase your intake of iron-rich, plant-based foods to absorb the same amount of iron as someone who eats meat.   Choose foods containing vitamin C to enhance iron absorption   You can enhance your body's absorption of iron by drinking citrus juice or eating other foods rich in vitamin C at the same time that you eat high-iron foods. Vitamin C in citrus juices, like orange juice, helps your body to better absorb dietary iron.   Vitamin C is also found in:   Broccoli   Grapefruit   Kiwi   Leafy greens   Mangoes   Melons   Oranges   Peppers   Strawberries   Tomatoes   Preventing iron deficiency anemia in infants   To prevent iron deficiency anemia in infants, feed your baby breast milk or iron-fortified formula for the first year. Cow's milk isn't a good source of iron for babies and isn't recommended for infants under one year. Iron from breast milk is more easily absorbed than the iron found in formula.     Medication(s) prescribed today:    Orders Placed This Encounter   Medications     gabapentin (NEURONTIN) 600 MG tablet     Sig: Take 1 tablet (600 mg) by mouth 2 times daily AND 1.5 tablets (900 mg) every evening.      Dispense:  315 tablet     Refill:  1     Dose increased.

## 2020-08-25 NOTE — TELEPHONE ENCOUNTER
[facility] Call Center    Phone Message    May a detailed message be left on voicemail: yes     Reason for Call: Medication Refill Request    Has the patient contacted the pharmacy for the refill? Yes   Name of medication being requested: metFORMIN (GLUCOPHAGE) 1000 MG tablet   Provider who prescribed the medication: Dr. Medrano  Pharmacy: [facility] in [place]  Date medication is needed: ASAP     Patient is completely out of medication.      Action Taken: Message routed to:  Adult Clinics: Endocrinology p [id number]    Travel Screening: Not Applicable

## 2020-08-27 ENCOUNTER — TELEPHONE (OUTPATIENT)
Dept: PEDIATRICS | Facility: CLINIC | Age: 65
End: 2020-08-27

## 2020-08-27 NOTE — TELEPHONE ENCOUNTER
[facility] Call Center    Phone Message    May a detailed message be left on voicemail: yes     Reason for Call: Order(s): Home Care Orders: Other: Home Care  needed Please advise. Thank you.    Action Taken: Message routed to:  Primary Care p [phone]    Travel Screening: Not Applicable

## 2020-08-27 NOTE — TELEPHONE ENCOUNTER
"Mildred with Home Care contacted, she would like a  to visit with patient to offer any community resources that may be available to her.  Request approved per \"Treatment and Evaluation Orders for Home Care, Nursing Home or Assisted Living Facilities\" RN Protocol.      Rupali Singer RN   "

## 2020-08-28 LAB — PAIN DRUG SCR UR W RPTD MEDS: NORMAL

## 2020-08-29 NOTE — RESULT ENCOUNTER NOTE
Dear Holly,   Your recent lab results showed the following:  -- urine drug screen is acceptable.     Please call or Mychart to our office if you have further questions.     Tiffany King MD-PhD

## 2020-08-31 ENCOUNTER — TELEPHONE (OUTPATIENT)
Dept: ENDOCRINOLOGY | Facility: CLINIC | Age: 65
End: 2020-08-31

## 2020-08-31 NOTE — TELEPHONE ENCOUNTER
Cde spoke with pt. Aracelis duran to meet with Cde on 09/14 @ 11am. Pt asked to bring bg meter to appt.     Mikayla Sellers RN, BSN, CDE   [facility]

## 2020-09-01 ENCOUNTER — TELEPHONE (OUTPATIENT)
Dept: PEDIATRICS | Facility: CLINIC | Age: 65
End: 2020-09-01

## 2020-09-01 NOTE — TELEPHONE ENCOUNTER
09/01/20 - 3:20pm:     Cde spoke with pt. States bg is down to 201 at this time and patient is feeling fine. No action needs to be taken at this time. Pt reminded of Virtual Visit with Dr Fernando daniels and In-Person Visit with Leora in 2 weeks.    Mikayla Sellers RN, BSN, LEORA   [facility]    -------------------------------------------------------------------------------------------------------------------------------------------------    Please refer diabetes questions to Endocrine. Home care RN should contact endocrine in the future for diabetes related questions. Mikayla tried to reach out to patient yesterday. I will include her in this as well.

## 2020-09-01 NOTE — TELEPHONE ENCOUNTER
[facility] Call Center    Phone Message    May a detailed message be left on voicemail: yes     Reason for Call: Sylwia with [facility] calling to report elevated blood sugar at the time of the patients home care visit.     The patient had orange juice, coffee with cream, 2 eggs and a slice of ham.  The patient did take her Metformin this morning.    11:45a- 587mg/dL  The patient administered 18 units of Novalog and rechecked at noon.  Results 12p- 489mg/dL.  The patient does not think she took her 64 units of Lantus last night.    Sylwia stated the Care Team can call her or Holly with advisements.    Patient cell: [phone]         Action Taken: Message routed to:  Primary Care p [phone]    Travel Screening: Not Applicable

## 2020-09-02 ENCOUNTER — VIRTUAL VISIT (OUTPATIENT)
Dept: PEDIATRICS | Facility: CLINIC | Age: 65
End: 2020-09-02
Payer: MEDICARE

## 2020-09-02 DIAGNOSIS — S92.352A CLOSED FRACTURE OF FIFTH METATARSAL BONE OF LEFT FOOT, PHYSEAL INVOLVEMENT UNSPECIFIED, INITIAL ENCOUNTER: ICD-10-CM

## 2020-09-02 DIAGNOSIS — M25.562 ARTHRALGIA OF BOTH KNEES: ICD-10-CM

## 2020-09-02 DIAGNOSIS — D50.9 IRON DEFICIENCY ANEMIA, UNSPECIFIED IRON DEFICIENCY ANEMIA TYPE: Primary | ICD-10-CM

## 2020-09-02 DIAGNOSIS — R25.1 TREMOR: ICD-10-CM

## 2020-09-02 DIAGNOSIS — M17.10 ARTHRITIS OF KNEE: ICD-10-CM

## 2020-09-02 DIAGNOSIS — Z53.9 DIAGNOSIS NOT YET DEFINED: Primary | ICD-10-CM

## 2020-09-02 DIAGNOSIS — M25.561 ARTHRALGIA OF BOTH KNEES: ICD-10-CM

## 2020-09-02 PROCEDURE — G0180 MD CERTIFICATION HHA PATIENT: HCPCS | Performed by: INTERNAL MEDICINE

## 2020-09-02 PROCEDURE — 99214 OFFICE O/P EST MOD 30 MIN: CPT | Mod: 95 | Performed by: INTERNAL MEDICINE

## 2020-09-02 RX ORDER — GABAPENTIN 600 MG/1
TABLET ORAL
Qty: 315 TABLET | Refills: 1 | COMMUNITY
Start: 2020-09-02 | End: 2020-09-20

## 2020-09-02 RX ORDER — MULTIVIT WITH MINERALS/LUTEIN
250 TABLET ORAL DAILY
Qty: 90 TABLET | Refills: 0 | Status: SHIPPED | OUTPATIENT
Start: 2020-09-02 | End: 2020-10-05

## 2020-09-02 RX ORDER — PROPRANOLOL HYDROCHLORIDE 80 MG/1
80 CAPSULE, EXTENDED RELEASE ORAL DAILY
Qty: 30 CAPSULE | Refills: 0 | Status: SHIPPED | OUTPATIENT
Start: 2020-09-02 | End: 2020-09-20

## 2020-09-02 RX ORDER — FERROUS GLUCONATE 324(38)MG
324 TABLET ORAL
Qty: 90 TABLET | Refills: 0 | Status: SHIPPED | OUTPATIENT
Start: 2020-09-02 | End: 2020-10-05

## 2020-09-02 NOTE — PATIENT INSTRUCTIONS
Make appointment(s) for:   -- virtual visit follow up in 4 weeks.   -- send in stool sample    Start iron supplement after you send in stool samples.         Medication(s) prescribed today:    Orders Placed This Encounter   Medications     gabapentin (NEURONTIN) 600 MG tablet     Sig: Take 1 tablet (600 mg) by mouth 2 times daily AND 2 tablets (1,200 mg) every evening.     Dispense:  315 tablet     Refill:  1     Dose increased. Rx not sent.     propranolol ER (INDERAL LA) 80 MG 24 hr capsule     Sig: Take 1 capsule (80 mg) by mouth daily     Dispense:  30 capsule     Refill:  0

## 2020-09-02 NOTE — PROGRESS NOTES
"Holly Muir is a 65 year old female who is being evaluated via a billable video visit.      The patient has been notified of following:     \"This video visit will be conducted via a call between you and your physician/provider. We have found that certain health care needs can be provided without the need for an in-person physical exam.  This service lets us provide the care you need with a video conversation.  If a prescription is necessary we can send it directly to your pharmacy.  If lab work is needed we can place an order for that and you can then stop by our lab to have the test done at a later time.    Video visits are billed at different rates depending on your insurance coverage.  Please reach out to your insurance provider with any questions.    If during the course of the call the physician/provider feels a video visit is not appropriate, you will not be charged for this service.\"    Patient has given verbal consent for Video visit? Yes  How would you like to obtain your AVS? Mail a copy  If you are dropped from the video visit, the video invite should be resent to: Text to cell phone: [phone]  Will anyone else be joining your video visit? No    Subjective     Holly Muir is a 65 year old female who presents today via video visit for the following health issues:    HPI    HOME CARE FACE TO FACE CONSULT     Video Start Time: 12:45 PM   was visiting patient today.   A face to face visit for home care episode completed.    Patient had labs done recently for routine monitoring, noted to have iron deficiency anemia.  Patient denied any GI issues.  No bright red blood per rectum, no black tarry stool.  Admitted that her eating has not been the best.  She had traveled to [place] to visit [employer] for about a month and came back.  She eats only 1 meal a day.  She is not vegetarian.    At her last visit, she had reported like jerking movements, worse at nighttime.  We increase " gabapentin dose to 900 mg daily.  Patient noted some improvement, but continues to have some jerking movements.      Within the last week patient also noted increased hand jerking spilling coffee.  Reported history of similar problem, but a lot worse before she went on propranolol.  Used to have a lot more tremor.  Currently has slight tremor, not as bad as before.    Review of Systems   Constitutional, HEENT, cardiovascular, pulmonary, gi and gu systems are negative, except as otherwise noted.      Objective           Vitals:  No vitals were obtained today due to virtual visit.    Physical Exam     GENERAL: Healthy, alert and no distress  EYES: Eyes grossly normal to inspection.  No discharge or erythema, or obvious scleral/conjunctival abnormalities.  RESP: No audible wheeze, cough, or visible cyanosis.  No visible retractions or increased work of breathing.    SKIN: Visible skin clear. No significant rash, abnormal pigmentation or lesions.  NEURO: Cranial nerves grossly intact.  Mentation and speech appropriate for age.  Positive for slight tremor with the right hand.  PSYCH: Mentation appears normal, affect normal/bright, judgement and insight intact, normal speech and appearance well-groomed.      None        Assessment & Plan     Arthritis of knee  -Patient will be seeing sports medicine in the next week.  In the meantime continue with gabapentin.  Increase nighttime gabapentin to 1200 mg.  - gabapentin (NEURONTIN) 600 MG tablet  Dispense: 315 tablet; Refill: 1    Arthralgia of both knees  See above  - gabapentin (NEURONTIN) 600 MG tablet  Dispense: 315 tablet; Refill: 1    Closed fracture of fifth metatarsal bone of left foot, physeal involvement unspecified, initial encounter  See above  - gabapentin (NEURONTIN) 600 MG tablet  Dispense: 315 tablet; Refill: 1    Tremor  -Hand jerking movements likely related to worsening tremor, increase propranolol to 80 mg  - propranolol ER (INDERAL LA) 80 MG 24 hr capsule   Dispense: 30 capsule; Refill: 0    Iron deficiency anemia, unspecified iron deficiency anemia type  -Patient will send stool samples to rule out microscopic GI bleed.  --Low ferritin may be contributing to nighttime leg jerking, more like a restless leg syndrome.  We will start iron supplement  - ferrous gluconate (FERGON) 324 (38 Fe) MG tablet  Dispense: 90 tablet; Refill: 0  - vitamin C (ASCORBIC ACID) 250 MG tablet  Dispense: 90 tablet; Refill: 0      Return in about 4 weeks (around 9/30/2020) for Virtual visit.    Tiffany King MD PhD  [facility]      Video-Visit Details    Type of service:  Video Visit    Video End Time:12:57 PM    Originating Location (pt. Location): Home    Distant Location (provider location):  [facility]     Platform used for Video Visit: Faisal

## 2020-09-14 ENCOUNTER — OFFICE VISIT (OUTPATIENT)
Dept: ORTHOPEDICS | Facility: CLINIC | Age: 65
End: 2020-09-14
Payer: MEDICARE

## 2020-09-14 ENCOUNTER — ALLIED HEALTH/NURSE VISIT (OUTPATIENT)
Dept: EDUCATION SERVICES | Facility: CLINIC | Age: 65
End: 2020-09-14
Payer: MEDICARE

## 2020-09-14 VITALS
SYSTOLIC BLOOD PRESSURE: 126 MMHG | WEIGHT: 213 LBS | BODY MASS INDEX: 39.2 KG/M2 | HEIGHT: 62 IN | DIASTOLIC BLOOD PRESSURE: 80 MMHG

## 2020-09-14 DIAGNOSIS — E11.9 DIABETES MELLITUS WITHOUT COMPLICATION (H): Primary | ICD-10-CM

## 2020-09-14 DIAGNOSIS — M47.816 SPONDYLOSIS WITHOUT MYELOPATHY OR RADICULOPATHY, LUMBAR REGION: ICD-10-CM

## 2020-09-14 DIAGNOSIS — M17.11 PRIMARY OSTEOARTHRITIS OF RIGHT KNEE: ICD-10-CM

## 2020-09-14 DIAGNOSIS — M54.50 CHRONIC BILATERAL LOW BACK PAIN WITHOUT SCIATICA: Primary | ICD-10-CM

## 2020-09-14 DIAGNOSIS — G89.29 CHRONIC BILATERAL LOW BACK PAIN WITHOUT SCIATICA: Primary | ICD-10-CM

## 2020-09-14 DIAGNOSIS — M17.12 PRIMARY OSTEOARTHRITIS OF LEFT KNEE: ICD-10-CM

## 2020-09-14 PROCEDURE — 99207 ZZC DROP WITH A PROCEDURE: CPT

## 2020-09-14 PROCEDURE — 20610 DRAIN/INJ JOINT/BURSA W/O US: CPT | Mod: 50 | Performed by: FAMILY MEDICINE

## 2020-09-14 PROCEDURE — G0108 DIAB MANAGE TRN  PER INDIV: HCPCS

## 2020-09-14 PROCEDURE — 99213 OFFICE O/P EST LOW 20 MIN: CPT | Mod: 25 | Performed by: FAMILY MEDICINE

## 2020-09-14 RX ORDER — TRIAMCINOLONE ACETONIDE 40 MG/ML
40 INJECTION, SUSPENSION INTRA-ARTICULAR; INTRAMUSCULAR
Status: DISCONTINUED | OUTPATIENT
Start: 2020-09-14 | End: 2022-09-14

## 2020-09-14 RX ORDER — LANCETS
EACH MISCELLANEOUS
Qty: 306 EACH | Refills: 1 | Status: SHIPPED | OUTPATIENT
Start: 2020-09-14 | End: 2021-04-12

## 2020-09-14 RX ADMIN — TRIAMCINOLONE ACETONIDE 40 MG: 40 INJECTION, SUSPENSION INTRA-ARTICULAR; INTRAMUSCULAR at 13:21

## 2020-09-14 ASSESSMENT — MIFFLIN-ST. JEOR: SCORE: 1469.41

## 2020-09-14 NOTE — LETTER
"    9/14/2020         RE: Holly Muir  [address] [address] [id number]        Dear Colleague,    Thank you for referring your patient, Holly Muir, to the [facility]. Please see a copy of my visit note below.    HISTORY OF PRESENT ILLNESS  Ms. Muir is a pleasant 65 year old female following up with back pain and bilateral knee pain.  I most recently saw her in June, this is for her lumbar spine.  I ordered a CT with plans to facilitate a lumbar injection.  She did not have the injection done, but has had a bladder stimulator placed in the meantime.  Unfortunately this did not help at all with her back pain.  She continues to feel pain in a bandlike distribution that starts at the center of her low back and radiates bilaterally.  This does not travel downwards, no symptoms past her waist.  She denies numbness or tingling down her legs.  Her knees are also bothering her again.  She last saw me for these last year, at that visit I performed bilateral knee injections.  This did help her for a few months.  She is interested in discussing these again.     PHYSICAL EXAM  Vitals:    09/14/20 1257   BP: 126/80   Weight: 96.6 kg (213 lb)   Height: 1.575 m (5' 2\")     General  - normal appearance, in no obvious distress  HEENT  - conjunctivae not injected, moist mucous membranes  CV  - normal popliteal pulse  Pulm  - normal respiratory pattern, non-labored  Musculoskeletal - right and left knee  - stance: mildly antalgic gait  - inspection: generalized swelling, trace effusion  - palpation: medial joint line tenderness  - ROM: 100 degrees flexion, 0 degrees extension, painful active ROM  - strength: 5/5 in flexion, 5/5 in extension    Musculoskeletal - lumbar spine  - stance: slow to rise and sit  - inspection: normal bone and joint alignment, no obvious scoliosis  - palpation: bilateral lumbar paravertebral tenderness at L3-S1   - ROM: pain with bilateral rotation, flexion past " 30 deg, extension  - special tests:  (-) slump test bilaterally    Neuro  - no sensory or motor deficit, grossly normal coordination, normal muscle tone  Skin  - no ecchymosis, erythema, warmth, or induration, no obvious rash  Psych  - interactive, appropriate, normal mood and affect          ASSESSMENT & PLAN  Ms. Muir is a 65 year old female following up with low back pain and bilateral knee pain.    I reviewed her lumbar CT in the room with her, this does show multilevel degenerative disease which appears worst at L4-5 where there is a disc bulge and bilateral facet arthropathy.  This results in mild canal stenosis and mild left foraminal stenosis.  She also has mild to moderate foraminal stenosis at L5-S1 bilaterally.    Holly and I had a good discussion regarding non-operative treatment for radicular leg pain.  This included strengthening of the paraspinal and core muscles, weight control, and oral analgesics when needed.  We also discussed the role of corticosteroid injections.  I am referring her for bilateral facet injections at L4-5.    As for her knees she does have osteoarthritis in her knees, worse in the left patellofemoral joint.    We revisited her options for arthritis treatment from a nonoperative perspective.  She is interested in bilateral knee injections again today, which we did perform (see procedure note).  She does know that these are something that we could try 3-4 times a year, if necessary, and if helpful.  If these do not help her pain whatsoever I would likely refer her to our surgical partners.    It was a pleasure seeing Holly.        Santy Harrington, DO, CAQSM      This note was constructed using Dragon dictation software, please excuse any minor errors in spelling, grammar, or syntax.        [facility] FOLLOW-UP VISIT 9/14/2020    Holly Muir's chief complaint for this visit includes:  Chief Complaint   Patient presents with     RECHECK     bilateral knee pain and  "low back pain      PCP: Tiffany King    Referring Provider:  No referring provider defined for this encounter.    /80   Ht 1.575 m (5' 2\")   Wt 96.6 kg (213 lb)   BMI 38.96 kg/m    Data Unavailable       Interval History:     Follow up reason: bilateral knee and low back pain     Pain: Worsening    Function: Worsening      Medical History:    Any recent changes to your medical history? No    Any new medication prescribed since last visit? No    Review of Systems:    Do you have fever, chills, weight loss? No    Do you have any vision problems? No    Do you have any chest pain or edema? No    Do you have any shortness of breath or wheezing?  No    Do you have stomach problems? No    Do you have any urinary track issues? No    Do you have any numbness or focal weakness? No    Do you have diabetes? No    Do you have problems with bleeding or clotting? No    Do you have an rashes or other skin lesions? No    Large Joint Injection/Arthocentesis: bilateral knee    Date/Time: 9/14/2020 1:21 PM  Performed by: Santy Harrington DO  Authorized by: Santy Harrington DO     Indications:  Pain  Needle Size:  22 G  Guidance: landmark guided    Approach:  Lateral  Location:  Knee  Laterality:  Bilateral      Medications (Right):  40 mg triamcinolone 40 MG/ML  Medications (Left):  40 mg triamcinolone 40 MG/ML  Outcome:  Tolerated well, no immediate complications  Procedure discussed: discussed risks, benefits, and alternatives    Consent Given by:  Patient  Timeout: timeout called immediately prior to procedure    Prep: patient was prepped and draped in usual sterile fashion       PROCEDURE    Knee Injection - Intraarticular    The patient was informed of the risks and the benefits of the procedure and a written consent was signed.    The patient's right knee was prepped with chlorhexidine in sterile fashion.   40 mg of triamcinolone suspension was drawn up into a 5 mL syringe with 4 mL of 1% lidocaine.  Injection was " performed using substerile technique.  A 1.5-inch 22-gauge needle was used to enter the lateral aspect of the right knee.  Injection performed successfully without difficulty.  There were no complications. The patient tolerated the procedure well. There was negligible bleeding.     The patient s left knee was prepped with chlorhexidine in sterile fashion.   40 mg of triamcinolone suspension was drawn up into a 5 mL syringe with 4 mL of 1% lidocaine.  Injection was performed using substerile technique.  A 1.5-inch 22-gauge needle was used to enter the lateral aspect of the left knee.  Injection performed successfully without difficulty.  There were no complications. The patient tolerated the procedure well. There was negligible bleeding.     The patient was instructed to ice the knee upon leaving clinic and refrain from overuse over the next 3 days.   The patient was instructed to call or go to the emergency room with any unusual pain, swelling, redness, or if otherwise concerned.                Again, thank you for allowing me to participate in the care of your patient.        Sincerely,        Santy Harrington DO

## 2020-09-14 NOTE — PROGRESS NOTES
"HISTORY OF PRESENT ILLNESS  Ms. Muir is a pleasant 65 year old female following up with back pain and bilateral knee pain.  I most recently saw her in June, this is for her lumbar spine.  I ordered a CT with plans to facilitate a lumbar injection.  She did not have the injection done, but has had a bladder stimulator placed in the meantime.  Unfortunately this did not help at all with her back pain.  She continues to feel pain in a bandlike distribution that starts at the center of her low back and radiates bilaterally.  This does not travel downwards, no symptoms past her waist.  She denies numbness or tingling down her legs.  Her knees are also bothering her again.  She last saw me for these last year, at that visit I performed bilateral knee injections.  This did help her for a few months.  She is interested in discussing these again.     PHYSICAL EXAM  Vitals:    09/14/20 1257   BP: 126/80   Weight: 96.6 kg (213 lb)   Height: 1.575 m (5' 2\")     General  - normal appearance, in no obvious distress  HEENT  - conjunctivae not injected, moist mucous membranes  CV  - normal popliteal pulse  Pulm  - normal respiratory pattern, non-labored  Musculoskeletal - right and left knee  - stance: mildly antalgic gait  - inspection: generalized swelling, trace effusion  - palpation: medial joint line tenderness  - ROM: 100 degrees flexion, 0 degrees extension, painful active ROM  - strength: 5/5 in flexion, 5/5 in extension    Musculoskeletal - lumbar spine  - stance: slow to rise and sit  - inspection: normal bone and joint alignment, no obvious scoliosis  - palpation: bilateral lumbar paravertebral tenderness at L3-S1   - ROM: pain with bilateral rotation, flexion past 30 deg, extension  - special tests:  (-) slump test bilaterally    Neuro  - no sensory or motor deficit, grossly normal coordination, normal muscle tone  Skin  - no ecchymosis, erythema, warmth, or induration, no obvious rash  Psych  - interactive, " "appropriate, normal mood and affect          ASSESSMENT & PLAN  Ms. Muir is a 65 year old female following up with low back pain and bilateral knee pain.    I reviewed her lumbar CT in the room with her, this does show multilevel degenerative disease which appears worst at L4-5 where there is a disc bulge and bilateral facet arthropathy.  This results in mild canal stenosis and mild left foraminal stenosis.  She also has mild to moderate foraminal stenosis at L5-S1 bilaterally.    Holly and I had a good discussion regarding non-operative treatment for radicular leg pain.  This included strengthening of the paraspinal and core muscles, weight control, and oral analgesics when needed.  We also discussed the role of corticosteroid injections.  I am referring her for bilateral facet injections at L4-5.    As for her knees she does have osteoarthritis in her knees, worse in the left patellofemoral joint.    We revisited her options for arthritis treatment from a nonoperative perspective.  She is interested in bilateral knee injections again today, which we did perform (see procedure note).  She does know that these are something that we could try 3-4 times a year, if necessary, and if helpful.  If these do not help her pain whatsoever I would likely refer her to our surgical partners.    It was a pleasure seeing Holly.        Santy Harrington, , CAQSM      This note was constructed using Dragon dictation software, please excuse any minor errors in spelling, grammar, or syntax.        [facility] FOLLOW-UP VISIT 9/14/2020    Holly Muir's chief complaint for this visit includes:  Chief Complaint   Patient presents with     RECHECK     bilateral knee pain and low back pain      PCP: Tiffany King    Referring Provider:  No referring provider defined for this encounter.    /80   Ht 1.575 m (5' 2\")   Wt 96.6 kg (213 lb)   BMI 38.96 kg/m    Data Unavailable       Interval History:     Follow up " reason: bilateral knee and low back pain     Pain: Worsening    Function: Worsening      Medical History:    Any recent changes to your medical history? No    Any new medication prescribed since last visit? No    Review of Systems:    Do you have fever, chills, weight loss? No    Do you have any vision problems? No    Do you have any chest pain or edema? No    Do you have any shortness of breath or wheezing?  No    Do you have stomach problems? No    Do you have any urinary track issues? No    Do you have any numbness or focal weakness? No    Do you have diabetes? No    Do you have problems with bleeding or clotting? No    Do you have an rashes or other skin lesions? No    Large Joint Injection/Arthocentesis: bilateral knee    Date/Time: 9/14/2020 1:21 PM  Performed by: Santy Harrington DO  Authorized by: Santy Harrington DO     Indications:  Pain  Needle Size:  22 G  Guidance: landmark guided    Approach:  Lateral  Location:  Knee  Laterality:  Bilateral      Medications (Right):  40 mg triamcinolone 40 MG/ML  Medications (Left):  40 mg triamcinolone 40 MG/ML  Outcome:  Tolerated well, no immediate complications  Procedure discussed: discussed risks, benefits, and alternatives    Consent Given by:  Patient  Timeout: timeout called immediately prior to procedure    Prep: patient was prepped and draped in usual sterile fashion       PROCEDURE    Knee Injection - Intraarticular    The patient was informed of the risks and the benefits of the procedure and a written consent was signed.    The patient's right knee was prepped with chlorhexidine in sterile fashion.   40 mg of triamcinolone suspension was drawn up into a 5 mL syringe with 4 mL of 1% lidocaine.  Injection was performed using substerile technique.  A 1.5-inch 22-gauge needle was used to enter the lateral aspect of the right knee.  Injection performed successfully without difficulty.  There were no complications. The patient tolerated the procedure well.  There was negligible bleeding.     The patient s left knee was prepped with chlorhexidine in sterile fashion.   40 mg of triamcinolone suspension was drawn up into a 5 mL syringe with 4 mL of 1% lidocaine.  Injection was performed using substerile technique.  A 1.5-inch 22-gauge needle was used to enter the lateral aspect of the left knee.  Injection performed successfully without difficulty.  There were no complications. The patient tolerated the procedure well. There was negligible bleeding.     The patient was instructed to ice the knee upon leaving clinic and refrain from overuse over the next 3 days.   The patient was instructed to call or go to the emergency room with any unusual pain, swelling, redness, or if otherwise concerned.

## 2020-09-14 NOTE — PATIENT INSTRUCTIONS
1. Return to checking blood sugar four times a day: before each meal and at bedtime.    2. Return to taking all insulin doses as prescribed:   - Basaglar: 65 units daily at bedtime  - Novolog: 15 units with each meal      Mikayla Sellers RN, BSN, CDE   [facility] [place]

## 2020-09-15 ENCOUNTER — TRANSFERRED RECORDS (OUTPATIENT)
Dept: HEALTH INFORMATION MANAGEMENT | Facility: CLINIC | Age: 65
End: 2020-09-15

## 2020-09-15 DIAGNOSIS — Z11.59 ENCOUNTER FOR SCREENING FOR OTHER VIRAL DISEASES: Primary | ICD-10-CM

## 2020-09-15 NOTE — PROGRESS NOTES
Diabetes Self Management Training: Individual Review Visit    Holly Muir presents today for evaluation of glucose control related to Type 2 diabetes.    She is accompanied by self    Patient's diabetes management related comments/concerns: None at this time.     Patient's emotional response to diabetes: expresses readiness to learn    Patient would like this visit to be focused around the following diabetes-related behaviors and goals: N/a.    ASSESSMENT:  Patient presents to clinic to meet with Cde for medication and bg review. Diagnosed with Type 2 Diabetes in . A1c has increased from 9.5 in  to 10.6.     Pt states it's been 4-6 weeks since she stopped checking bg on a regular basis. Had been in [place] visiting family during this time and admits to just not feeling like checking bg. Pt returned on Aug 25th. Still struggling to get back on track. Admits to forgetting Basaglar 2-3 times a week. Forgets Novolog about once a day.     Current Diabetes Management per Patient:  Taking diabetes medications? Current insulin doses: Basaglar: 65 units daily at bedtime. Novolog: 15 units with B/L/D. Continues to take Metformin and Actos as prescribed.     Diabetes Medication(s)     Biguanides       metFORMIN (GLUCOPHAGE) 1000 MG tablet    Take 1 tablet (1,000 mg) by mouth 2 times daily (with meals)    Insulin       insulin aspart (NOVOLOG FLEXPEN) 100 UNIT/ML pen    15 UNIT SUBCUTANEOUS PER MEAL THREE TIMES A DAY.     insulin glargine (BASAGLAR KWIKPEN) 100 UNIT/ML pen    Take 55 units daily.    Insulin Sensitizing Agents       pioglitazone (ACTOS) 45 MG tablet    Take 1 tablet (45 mg) by mouth daily          Do you have any difficulty affording your medications or glucose monitoring supplies? No.       Patient glucose self monitoring as follows: see above.  BG meter: did not bring meter to appt.   BG Results: BG checked with clinic meter. B, 30 minutes post breakfast. Pt did not have Novolog with her.   "    BG values are: Not in goal  Patient's most recent   Lab Results   Component Value Date    A1C 10.6 08/24/2020    is not meeting goal of <7.0    Nutrition:  Patient states she wakes up around 7am. Watches TV in bed until around 10am. First meal is eaten around this time. Bedtime is around 9pm. Turns on TV to help her fall asleep. Sleeps for 3-4 hours at a time. Will usually take a nap during the day due to fatigue. Recently diagnosed with iron deficiency.     Physical Activity:    Not discussed today.     Diabetes Risk Factors:  Age over 45 years  Ethnicity  Overweight  Inactivity     Relevant co-morbidities and related health problems:  Significant for:  None    Diabetes Complications:  Not discussed today.    Recent health service and resource utilization related to diabetes (hyperglycemia, hypoglycemia, etc):   Not Assessed    Vitals:  There were no vitals taken for this visit.  Estimated body mass index is 38.96 kg/m  as calculated from the following:    Height as of an earlier encounter on 9/14/20: 1.575 m (5' 2\").    Weight as of an earlier encounter on 9/14/20: 96.6 kg (213 lb).   Last 3 BP:   BP Readings from Last 3 Encounters:   09/14/20 126/80   06/22/20 (!) 141/63   06/18/20 125/78       History   Smoking Status     Former Smoker     Packs/day: 0.00     Quit date: 8/9/2016   Smokeless Tobacco     Never Used       Labs:  Lab Results   Component Value Date    A1C 10.6 08/24/2020     Lab Results   Component Value Date     08/24/2020     Lab Results   Component Value Date    LDL 26 06/18/2020     HDL Cholesterol   Date Value Ref Range Status   06/18/2020 78 >49 mg/dL Final   ]  GFR Estimate   Date Value Ref Range Status   08/24/2020 >90 >60 mL/min/[1.73_m2] Final     Comment:     Non  GFR Calc  Starting 12/18/2018, serum creatinine based estimated GFR (eGFR) will be   calculated using the Chronic Kidney Disease Epidemiology Collaboration   (CKD-EPI) equation.       GFR Estimate If " "Black   Date Value Ref Range Status   08/24/2020 >90 >60 mL/min/[1.73_m2] Final     Comment:      GFR Calc  Starting 12/18/2018, serum creatinine based estimated GFR (eGFR) will be   calculated using the Chronic Kidney Disease Epidemiology Collaboration   (CKD-EPI) equation.       Lab Results   Component Value Date    CR 0.68 08/24/2020       Socio/Economic/Cultural Considerations:    Support system: Lives alone. Daughter lives nearby.      Cultural Influences/Ethnic Background:  American      Health Literacy/Numeracy:  \"With diabetes, it's helpful to use forms and log books to write down blood sugars and what you're eating at times to help understand how foods affect your blood sugars. With this in mind how confident are you at filling out medical forms, such as these, by yourself?  Not Assessed    Health Beliefs and Attitudes:   Patient Activation Measure Survey Score:  RADHA Score (Last Two) 9/15/2011 1/5/2012   RADHA Raw Score 30 35   Activation Score 37.3 45.2   RADHA Level 1 1       INTERVENTION:   Education provided today on:  Monitoring: Discussed need to begin to check bg regularly again.   Taking Medication: Diiscussed need to take all medication as prescribed.     Opportunities for ongoing education and support in diabetes-self management were discussed.    Pt verbalized understanding of concepts discussed and recommendations provided today.       Education Materials Provided:  No new materials provided today    PLAN:  Monitoring: check bg 4 times a day: before meals and at bedtime.   Taking Medication: since pt forget to bring Novolog with her and had breakfast on the road, pt advised to take 8 units when she returns home.   Put Basagalr pen device on bedside table to help you remember to take injection at bedtime. Take Novolog with you when are out and about.     FOLLOW-UP:  F/u with Endo on 12/11/20    Ongoing plan for education and support: F/u with Cde on 10/14/20.    Time Spent: 30 " minutes  Encounter Type: Individual    Any diabetes medication dose changes were made via the CDE Protocol and Collaborative Practice Agreement with the patient's endocrinology provider. A copy of this encounter was shared with the provider.    Holly Muir comes into clinic today at the request of Dr Medrano, Ordering Provider for Pt Teaching and bg review.     This service provided today was under the supervising provider of the day Dr Karimi, who was available if needed.    Mikayla Sellers RN, BSN, CDE   [facility]

## 2020-09-22 DIAGNOSIS — M25.561 ARTHRALGIA OF BOTH KNEES: ICD-10-CM

## 2020-09-22 DIAGNOSIS — M17.10 ARTHRITIS OF KNEE: ICD-10-CM

## 2020-09-22 DIAGNOSIS — S92.352A CLOSED FRACTURE OF FIFTH METATARSAL BONE OF LEFT FOOT, PHYSEAL INVOLVEMENT UNSPECIFIED, INITIAL ENCOUNTER: ICD-10-CM

## 2020-09-22 DIAGNOSIS — M25.562 ARTHRALGIA OF BOTH KNEES: ICD-10-CM

## 2020-09-24 RX ORDER — GABAPENTIN 600 MG/1
TABLET ORAL
Qty: 270 TABLET | Refills: 3 | OUTPATIENT
Start: 2020-09-24

## 2020-09-29 NOTE — TELEPHONE ENCOUNTER
[facility] but no answer after 5+ minutes hold time.    Called patient who states she received a message from pharmacy that her Gabapentin is ready for .    Bere ESTEBAN, CMA

## 2020-10-01 DIAGNOSIS — E11.9 DIABETES MELLITUS (H): Primary | ICD-10-CM

## 2020-10-01 DIAGNOSIS — D50.9 IRON DEFICIENCY ANEMIA, UNSPECIFIED IRON DEFICIENCY ANEMIA TYPE: ICD-10-CM

## 2020-10-02 DIAGNOSIS — Z11.59 ENCOUNTER FOR SCREENING FOR OTHER VIRAL DISEASES: ICD-10-CM

## 2020-10-02 PROCEDURE — 99207 PR NO CHARGE LOS: CPT

## 2020-10-02 PROCEDURE — U0003 INFECTIOUS AGENT DETECTION BY NUCLEIC ACID (DNA OR RNA); SEVERE ACUTE RESPIRATORY SYNDROME CORONAVIRUS 2 (SARS-COV-2) (CORONAVIRUS DISEASE [COVID-19]), AMPLIFIED PROBE TECHNIQUE, MAKING USE OF HIGH THROUGHPUT TECHNOLOGIES AS DESCRIBED BY CMS-2020-01-R: HCPCS | Performed by: INTERNAL MEDICINE

## 2020-10-02 RX ORDER — INSULIN GLARGINE 100 [IU]/ML
INJECTION, SOLUTION SUBCUTANEOUS
Qty: 60 ML | Refills: 7 | Status: SHIPPED | OUTPATIENT
Start: 2020-10-02 | End: 2020-10-14

## 2020-10-03 LAB
SARS-COV-2 RNA SPEC QL NAA+PROBE: NOT DETECTED
SPECIMEN SOURCE: NORMAL

## 2020-10-05 RX ORDER — ASPIRIN 81 MG/1
81 TABLET, CHEWABLE ORAL DAILY
Qty: 90 TABLET | Refills: 3 | OUTPATIENT
Start: 2020-10-05

## 2020-10-05 NOTE — TELEPHONE ENCOUNTER
FERROUS GLUCONATE 324 MG TAB  Last Written Prescription Date:  9/2/2020  Last Fill Quantity: 90,   # refills: 0  Last Office Visit : 9/2/2020  Future Office visit:  10/6/2020  Routing refill request to provider for review/approval because:  Pt asked to follow up in 4 weeks.   Has appointment on 10/6/2020.    Would Provider like to check labs to see where labs are at before filling medication?  Or Is it okay to fill med with refills for Pt care.      Refer to Provider for review and refills per Providers recommendations.     [facility] VITAMIN C 500 MG CAPLET   Last Written Prescription Date:  9/2/2020  Last Fill Quantity: 90,   # refills: 0  Last Office Visit : 9/2/2020  Future Office visit:  10/6/2020  Routing refill request to provider for review/approval because:  Pt asked to follow up in 4 weeks.   Has appointment on 10/6/2020.    Would Provider like to check labs to see where labs are at before filling medication?  Or Is it okay to fill med with refills for Pt care.      Refer to Provider for review and refills per Providers recommendations.       Noy Allan RN  Central Triage Red Flags/Med Refills    9/2/2020  Iron deficiency anemia, unspecified iron deficiency anemia type  -Patient will send stool samples to rule out microscopic GI bleed.  --Low ferritin may be contributing to nighttime leg jerking, more like a restless leg syndrome.  We will start iron supplement  - ferrous gluconate (FERGON) 324 (38 Fe) MG tablet  Dispense: 90 tablet; Refill: 0  - vitamin C (ASCORBIC ACID) 250 MG tablet  Dispense: 90 tablet; Refill: 0        Return in about 4 weeks (around 9/30/2020) for Virtual visit.     Tiffany King MD PhD  [facility]       [place] ASPIRIN 81 MG CHEW TAB   [facility] ASPIRIN ADULT LOW DOSE 81 MG chewable tablet 90 tablet 3 1/31/2020  No   Sig: TAKE 1 TABLET BY MOUTH EVERY DAY   Sent to pharmacy as: [facility] ASPIRIN ADULT LOW DOSE 81 MG chewable tablet   Class: E-Prescribe   Order: [id number]   E-Prescribing  Status: Receipt confirmed by pharmacy (1/31/2020 10:19 AM CST)   Printout Tracking    External Result Report   Pharmacy    [id number] - [place], [place] - [address] AT [facility]

## 2020-10-06 ENCOUNTER — VIRTUAL VISIT (OUTPATIENT)
Dept: PEDIATRICS | Facility: CLINIC | Age: 65
End: 2020-10-06
Payer: MEDICARE

## 2020-10-06 ENCOUNTER — ANCILLARY PROCEDURE (OUTPATIENT)
Dept: GENERAL RADIOLOGY | Facility: CLINIC | Age: 65
End: 2020-10-06
Attending: FAMILY MEDICINE
Payer: MEDICARE

## 2020-10-06 ENCOUNTER — TELEPHONE (OUTPATIENT)
Dept: PEDIATRICS | Facility: CLINIC | Age: 65
End: 2020-10-06

## 2020-10-06 VITALS — DIASTOLIC BLOOD PRESSURE: 81 MMHG | HEART RATE: 68 BPM | OXYGEN SATURATION: 99 % | SYSTOLIC BLOOD PRESSURE: 128 MMHG

## 2020-10-06 DIAGNOSIS — M54.50 CHRONIC BILATERAL LOW BACK PAIN WITHOUT SCIATICA: ICD-10-CM

## 2020-10-06 DIAGNOSIS — G89.29 CHRONIC BILATERAL LOW BACK PAIN WITHOUT SCIATICA: ICD-10-CM

## 2020-10-06 DIAGNOSIS — D50.9 IRON DEFICIENCY ANEMIA, UNSPECIFIED IRON DEFICIENCY ANEMIA TYPE: ICD-10-CM

## 2020-10-06 DIAGNOSIS — M47.816 SPONDYLOSIS WITHOUT MYELOPATHY OR RADICULOPATHY, LUMBAR REGION: ICD-10-CM

## 2020-10-06 DIAGNOSIS — G25.81 RESTLESS LEG SYNDROME: Primary | ICD-10-CM

## 2020-10-06 PROCEDURE — 96372 THER/PROPH/DIAG INJ SC/IM: CPT | Mod: 59 | Performed by: FAMILY MEDICINE

## 2020-10-06 PROCEDURE — 64493 INJ PARAVERT F JNT L/S 1 LEV: CPT

## 2020-10-06 PROCEDURE — 99442 PR PHYSICIAN TELEPHONE EVALUATION 11-20 MIN: CPT | Mod: 95 | Performed by: INTERNAL MEDICINE

## 2020-10-06 RX ORDER — BUPIVACAINE HYDROCHLORIDE 5 MG/ML
5 INJECTION, SOLUTION PERINEURAL ONCE
Status: COMPLETED | OUTPATIENT
Start: 2020-10-06 | End: 2020-10-06

## 2020-10-06 RX ORDER — ASCORBIC ACID 500 MG
TABLET ORAL
Qty: 45 TABLET | Refills: 3 | Status: SHIPPED | OUTPATIENT
Start: 2020-10-06 | End: 2021-10-22

## 2020-10-06 RX ORDER — LIDOCAINE HYDROCHLORIDE 10 MG/ML
5 INJECTION, SOLUTION EPIDURAL; INFILTRATION; INTRACAUDAL; PERINEURAL ONCE
Status: COMPLETED | OUTPATIENT
Start: 2020-10-06 | End: 2020-10-06

## 2020-10-06 RX ORDER — FERROUS GLUCONATE 324(38)MG
324 TABLET ORAL
Qty: 90 TABLET | Refills: 3 | Status: SHIPPED | OUTPATIENT
Start: 2020-10-06 | End: 2021-10-22

## 2020-10-06 RX ORDER — METHYLPREDNISOLONE ACETATE 40 MG/ML
40 INJECTION, SUSPENSION INTRA-ARTICULAR; INTRALESIONAL; INTRAMUSCULAR; SOFT TISSUE ONCE
Status: COMPLETED | OUTPATIENT
Start: 2020-10-06 | End: 2020-10-06

## 2020-10-06 RX ORDER — IOPAMIDOL 408 MG/ML
10 INJECTION, SOLUTION INTRATHECAL ONCE
Status: COMPLETED | OUTPATIENT
Start: 2020-10-06 | End: 2020-10-06

## 2020-10-06 RX ADMIN — IOPAMIDOL 1 ML: 408 INJECTION, SOLUTION INTRATHECAL at 11:16

## 2020-10-06 RX ADMIN — METHYLPREDNISOLONE ACETATE 40 MG: 40 INJECTION, SUSPENSION INTRA-ARTICULAR; INTRALESIONAL; INTRAMUSCULAR; SOFT TISSUE at 11:17

## 2020-10-06 RX ADMIN — BUPIVACAINE HYDROCHLORIDE 2 ML: 5 INJECTION, SOLUTION PERINEURAL at 11:16

## 2020-10-06 RX ADMIN — LIDOCAINE HYDROCHLORIDE 5 ML: 10 INJECTION, SOLUTION EPIDURAL; INFILTRATION; INTRACAUDAL; PERINEURAL at 11:17

## 2020-10-06 RX ADMIN — LIDOCAINE HYDROCHLORIDE 5 ML: 10 INJECTION, SOLUTION EPIDURAL; INFILTRATION; INTRACAUDAL; PERINEURAL at 11:16

## 2020-10-06 RX ADMIN — METHYLPREDNISOLONE ACETATE 40 MG: 40 INJECTION, SUSPENSION INTRA-ARTICULAR; INTRALESIONAL; INTRAMUSCULAR; SOFT TISSUE at 11:16

## 2020-10-06 NOTE — PROGRESS NOTES
"Holly Muir is a 65 year old female who is being evaluated via a billable telephone visit.      The patient has been notified of following:     \"This telephone visit will be conducted via a call between you and your physician/provider. We have found that certain health care needs can be provided without the need for a physical exam.  This service lets us provide the care you need with a short phone conversation.  If a prescription is necessary we can send it directly to your pharmacy.  If lab work is needed we can place an order for that and you can then stop by our lab to have the test done at a later time.    Telephone visits are billed at different rates depending on your insurance coverage. During this emergency period, for some insurers they may be billed the same as an in-person visit.  Please reach out to your insurance provider with any questions.    If during the course of the call the physician/provider feels a telephone visit is not appropriate, you will not be charged for this service.\"    Patient has given verbal consent for Telephone visit?  Yes    What phone number would you like to be contacted at? [phone]    How would you like to obtain your AVS? Dinesh Staley     Holly Muir is a 65 year old female who presents via phone visit today for the following health issues:    HPI     Pt is having follow up on her restless leg syndrome. Pt states she is doing better with increase Gabapentin to 1200 mg at bedtime but continues to have issues.     She was noted to have iron deficiency anemia and started on supplement as well. Pt continues to deny any active bleeding. No blood in the stool and. Stool is darker with iron.     I had ordered stool hemoccult test but she has not sent back testing yet.           Objective          Vitals:  No vitals were obtained today due to virtual visit.    healthy, alert and no distress  PSYCH: Alert and oriented times 3; coherent speech, normal   rate and " volume, able to articulate logical thoughts, able   to abstract reason, no tangential thoughts, no hallucinations   or delusions  Her affect is normal  RESP: No cough, no audible wheezing, able to talk in full sentences  Remainder of exam unable to be completed due to telephone visits        Assessment/Plan:    Assessment & Plan     Iron deficiency anemia, unspecified iron deficiency anemia type  Continue iron supplement.  Advised pt to send back stool testing.     Restless leg syndrome  Add Ropinirole.     See Patient Instructions    Tiffany King MD PhD  [facility]    Phone call duration:  12 minutes

## 2020-10-06 NOTE — PROGRESS NOTES
AFTER YOU GO HOME    ? DO relax; minimize your activity for 24 hours  ? You may resume normal activity tomorrow  ? You may remove the bandage in the evening or next morning  ? You may resume bathing the next day  ? Drink at least 4 extra glasses of fluid today if not on fluid restrictions  ? DO NOT drive or operate machinery at home or at work for at least 24 hours      VISIT THE EMERGENCY ROOM OR URGENT CARE IF:    ? There is redness or swelling at the injection site  ? There is discharge from the injection site  ? You develop a temperature of 101  F or greater      ADDITIONAL INSTRUCTIONS:     ? You may resume your Coumadin or other blood thinner at your regular dose today.  Follow up with your physician to have your INR rechecked if indicated.  ? If you gain no relief from the injection after two (2) weeks, follow-up with your provider for your options.        Contacts:    During business hours from 8 to 5 pm, you may call [phone] to reach a nurse advisor at [facility].  After hours, call [facility]  [phone].  Ask for the Radiologist on-call.  Someone is on-call 24 hrs/day.  [facility] Toll Free Number   [phone]

## 2020-10-06 NOTE — TELEPHONE ENCOUNTER
Tried to call this patient twice.  Confirmed the phone number, she did not receive a call.  Rescheduled for tomorrow 2:50 pm.         Liz Boogie RN, [facility]

## 2020-10-06 NOTE — PROGRESS NOTES
Holly was seen in X-ray today for a lumbar facet injection. Patient rated pain before procedure 5/10. After procedure patient rated pain 3/10.   This pain level is acceptable to patient. Patient discharged home with .

## 2020-10-06 NOTE — TELEPHONE ENCOUNTER
[facility] Call Center    Phone Message    May a detailed message be left on voicemail: yes     Reason for Call: Other: Pt states she had a 11:30am phone call wiht Dr. King but never received the call. Writer offered to reschedule an appt but pt would like to speak to clinic first. Please call pt back.      Action Taken: Message routed to:  Primary Care p [phone]    Travel Screening: Not Applicable

## 2020-10-07 ENCOUNTER — VIRTUAL VISIT (OUTPATIENT)
Dept: PEDIATRICS | Facility: CLINIC | Age: 65
End: 2020-10-07
Payer: MEDICARE

## 2020-10-07 DIAGNOSIS — D50.9 IRON DEFICIENCY ANEMIA, UNSPECIFIED IRON DEFICIENCY ANEMIA TYPE: ICD-10-CM

## 2020-10-07 DIAGNOSIS — G25.81 RESTLESS LEG SYNDROME: Primary | ICD-10-CM

## 2020-10-07 PROCEDURE — 99442 PR PHYSICIAN TELEPHONE EVALUATION 11-20 MIN: CPT | Mod: 95 | Performed by: INTERNAL MEDICINE

## 2020-10-07 RX ORDER — ROPINIROLE 0.25 MG/1
0.25 TABLET, FILM COATED ORAL EVERY EVENING
Qty: 30 TABLET | Refills: 0 | Status: SHIPPED | OUTPATIENT
Start: 2020-10-07 | End: 2020-11-05

## 2020-10-07 NOTE — PROGRESS NOTES
"Holly Muir is a 65 year old female who is being evaluated via a billable telephone visit.      The patient has been notified of following:     \"This telephone visit will be conducted via a call between you and your physician/provider. We have found that certain health care needs can be provided without the need for a physical exam.  This service lets us provide the care you need with a short phone conversation.  If a prescription is necessary we can send it directly to your pharmacy.  If lab work is needed we can place an order for that and you can then stop by our lab to have the test done at a later time.    Telephone visits are billed at different rates depending on your insurance coverage. During this emergency period, for some insurers they may be billed the same as an in-person visit.  Please reach out to your insurance provider with any questions.    If during the course of the call the physician/provider feels a telephone visit is not appropriate, you will not be charged for this service.\"    Patient has given verbal consent for Telephone visit?  Yes    What phone number would you like to be contacted at? [phone]    How would you like to obtain your AVS? Dinesh Staley     Holly Muir is a 65 year old female who presents via phone visit today for the following health issues:    HPI     Pt is having telephone visit for her restless leg syndrome pt states she is still having issues with this. Night time it worse. Has difficulty to get to sleep.     Snack during the day, then dinner. Started having a little lunch about 2 weeks ago.     Ibuprofen use for back pain once a great while when tramadol doesn't help. About once a month or so.     Known iron deficiency anemia. Started iron supplement in August. There was plan to check hemoccult. Pt just picked up the testing kit. Plans to return the test within the week.      Review of Systems   Constitutional, HEENT, cardiovascular, pulmonary, gi " and gu systems are negative, except as otherwise noted.       Objective          Vitals:  No vitals were obtained today due to virtual visit.    healthy, alert and no distress  PSYCH: Alert and oriented times 3; coherent speech, normal   rate and volume, able to articulate logical thoughts, able   to abstract reason, no tangential thoughts, no hallucinations   or delusions  Her affect is normal  RESP: No cough, no audible wheezing, able to talk in full sentences  Remainder of exam unable to be completed due to telephone visits            Assessment/Plan:    Assessment & Plan     Holly was seen today for recheck medication.    Diagnoses and all orders for this visit:    Restless leg syndrome  -     rOPINIRole (REQUIP) 0.25 MG tablet; Take 1 tablet (0.25 mg) by mouth every evening    Iron deficiency anemia, unspecified iron deficiency anemia type  -     Hemoglobin; Future  -     Iron and iron binding capacity; Future  -     Ferritin; Future    She will get labs done on 10/14/2020 when she comes for diabetes educator's visit.   Virtual visit follow up on restless legs in 4 weeks.         Return in about 1 week (around 10/14/2020) for Lab appointment.    Tiffany King MD PhD  [facility]    Phone call duration:  12 minutes

## 2020-10-07 NOTE — PATIENT INSTRUCTIONS
Make appointment(s) for:   -- virtual visit in 4 weeks  -- get labs on 10/14/2020 when you come to see diabetes educator.         Medication(s) prescribed today:    Orders Placed This Encounter   Medications     rOPINIRole (REQUIP) 0.25 MG tablet     Sig: Take 1 tablet (0.25 mg) by mouth every evening     Dispense:  30 tablet     Refill:  0            heat/transcutaneous electric nerve stimulator/medications

## 2020-10-14 ENCOUNTER — ALLIED HEALTH/NURSE VISIT (OUTPATIENT)
Dept: EDUCATION SERVICES | Facility: CLINIC | Age: 65
End: 2020-10-14
Payer: MEDICARE

## 2020-10-14 DIAGNOSIS — E11.40 TYPE 2 DIABETES MELLITUS WITH DIABETIC NEUROPATHY, WITH LONG-TERM CURRENT USE OF INSULIN (H): Primary | ICD-10-CM

## 2020-10-14 DIAGNOSIS — E11.9 DIABETES MELLITUS (H): ICD-10-CM

## 2020-10-14 DIAGNOSIS — E11.9 TYPE 2 DIABETES MELLITUS (H): ICD-10-CM

## 2020-10-14 DIAGNOSIS — D50.9 IRON DEFICIENCY ANEMIA, UNSPECIFIED IRON DEFICIENCY ANEMIA TYPE: ICD-10-CM

## 2020-10-14 DIAGNOSIS — Z79.4 UNCONTROLLED TYPE 2 DIABETES MELLITUS WITH HYPERGLYCEMIA, WITH LONG-TERM CURRENT USE OF INSULIN (H): ICD-10-CM

## 2020-10-14 DIAGNOSIS — Z79.4 TYPE 2 DIABETES MELLITUS WITH DIABETIC NEUROPATHY, WITH LONG-TERM CURRENT USE OF INSULIN (H): Primary | ICD-10-CM

## 2020-10-14 DIAGNOSIS — E11.65 UNCONTROLLED TYPE 2 DIABETES MELLITUS WITH HYPERGLYCEMIA, WITH LONG-TERM CURRENT USE OF INSULIN (H): ICD-10-CM

## 2020-10-14 LAB
FERRITIN SERPL-MCNC: 40 NG/ML (ref 8–252)
HGB BLD-MCNC: 11 G/DL (ref 11.7–15.7)
IRON SATN MFR SERPL: 10 % (ref 15–46)
IRON SERPL-MCNC: 43 UG/DL (ref 35–180)
TIBC SERPL-MCNC: 434 UG/DL (ref 240–430)

## 2020-10-14 PROCEDURE — 83550 IRON BINDING TEST: CPT | Performed by: INTERNAL MEDICINE

## 2020-10-14 PROCEDURE — 85018 HEMOGLOBIN: CPT | Performed by: INTERNAL MEDICINE

## 2020-10-14 PROCEDURE — 82728 ASSAY OF FERRITIN: CPT | Performed by: INTERNAL MEDICINE

## 2020-10-14 PROCEDURE — G0108 DIAB MANAGE TRN  PER INDIV: HCPCS

## 2020-10-14 PROCEDURE — 36415 COLL VENOUS BLD VENIPUNCTURE: CPT | Performed by: INTERNAL MEDICINE

## 2020-10-14 PROCEDURE — 83540 ASSAY OF IRON: CPT | Performed by: INTERNAL MEDICINE

## 2020-10-14 RX ORDER — INSULIN GLARGINE 100 [IU]/ML
INJECTION, SOLUTION SUBCUTANEOUS
Qty: 45 ML | Refills: 4 | Status: SHIPPED | OUTPATIENT
Start: 2020-10-14 | End: 2021-05-10

## 2020-10-14 NOTE — PROGRESS NOTES
Diabetes Self Management Training: Follow-up Visit    Holly Muir presents today for evaluation of glucose control Type 2 diabetes.    She is accompanied by self    Patient's diabetes management related comments/concerns: Hypoglycemia.    Patient would like this visit to be focused around the following diabetes-related behaviors and goals: Taking Medication.    ASSESSMENT:  Patient presents to clinic to meet with Cde for bg/medication review. Pt last seen by Cde on 09/14. Pt diagnosed with Type 2 Diabetes in 2002.   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 States energy level has improved since starting iron supplement. States Pcp increased gabapentin dose and this has helped improve restless leg syndrome. She is sleeping more restfully now.     Current Diabetes Management per Patient:  Taking diabetes medications?  Yes: See below. Pt currently taking 65 units Basaglar daily, at bedtime. Has self-decreased Novolog meal dose to 6 units for a smaller meal, 12 units for a larger meal.     Diabetes Medication(s)     Biguanides       metFORMIN (GLUCOPHAGE) 1000 MG tablet    Take 1 tablet (1,000 mg) by mouth 2 times daily (with meals)    Insulin       insulin aspart (NOVOLOG FLEXPEN) 100 UNIT/ML pen    15 UNIT SUBCUTANEOUS PER MEAL THREE TIMES A DAY.     insulin glargine (BASAGLAR KWIKPEN) 100 UNIT/ML pen    TAKE 62 UNITS SUBCUTANEOUSLY DAILY.    Insulin Sensitizing Agents       pioglitazone (ACTOS) 45 MG tablet    Take 1 tablet (45 mg) by mouth daily          Do you have any difficulty affording your medications or glucose monitoring supplies? No.     Patient glucose self monitoring as follows: three times daily.   BG meter: Accu-Chek Expert meter  BG Results: Unable to download meter.   10/14: 6:30am: 323, 7:45am: 197  10/13: 5:30am: 225, 7:30am: 74, 3;30pm: 173  10/12: 2:30am: 143, 4pm: 242, 6pm: 151, 10pm: 187  10/11: 8am: 143, 2pm: 227, 6pm: 134, 8:30pm: 64, 9pm: 164    Pt  "verbalizes, in general, she has noticed a decrease in bg levels mid-afternoon and early evening. States she feels \"weird\" and shaky when bg < 70. Admits to omitting Basaglar dose when bedtime bg < 80 due to fear of going low while sleeping.     BG values are: Not in goal  Patient's most recent   Lab Results   Component Value Date    A1C 10.6 08/24/2020    is not meeting goal of <7.0    Nutrition:  Pt states she has been serving herself smaller portions. She is trying to lose weight.     Physical Activity:    Pt has started actively increasing the number of times she goes up and down stairs in her home.     Diabetes Complications:  Not discussed today.    Recent health service and resource utilization related to diabetes (hyperglycemia, hypoglycemia, etc.):   None.     Vitals:  There were no vitals taken for this visit.  Estimated body mass index is 38.96 kg/m  as calculated from the following:    Height as of 9/14/20: 1.575 m (5' 2\").    Weight as of 9/14/20: 96.6 kg (213 lb).   Last 3 BP:   BP Readings from Last 3 Encounters:   10/06/20 128/81   09/14/20 126/80   06/22/20 (!) 141/63       History   Smoking Status     Former Smoker     Packs/day: 0.00     Quit date: 8/9/2016   Smokeless Tobacco     Never Used       Labs:  Lab Results   Component Value Date    A1C 10.6 08/24/2020     Lab Results   Component Value Date     08/24/2020     Lab Results   Component Value Date    LDL 26 06/18/2020     HDL Cholesterol   Date Value Ref Range Status   06/18/2020 78 >49 mg/dL Final   ]  GFR Estimate   Date Value Ref Range Status   08/24/2020 >90 >60 mL/min/[1.73_m2] Final     Comment:     Non  GFR Calc  Starting 12/18/2018, serum creatinine based estimated GFR (eGFR) will be   calculated using the Chronic Kidney Disease Epidemiology Collaboration   (CKD-EPI) equation.       GFR Estimate If Black   Date Value Ref Range Status   08/24/2020 >90 >60 mL/min/[1.73_m2] Final     Comment:      " GFR Calc  Starting 12/18/2018, serum creatinine based estimated GFR (eGFR) will be   calculated using the Chronic Kidney Disease Epidemiology Collaboration   (CKD-EPI) equation.       Lab Results   Component Value Date    CR 0.68 08/24/2020       Health Beliefs and Attitudes:   Patient Activation Measure Survey Score:  RADHA Score (Last Two) 9/15/2011 1/5/2012   RADHA Raw Score 30 35   Activation Score 37.3 45.2   RADHA Level 1 1       Barriers to Learning; Mild cognitive impairment. Pt processes information better when instuctions are short and concise.       INTERVENTION:    Education provided today on:  Taking Medication: insulin dose changes made.     Opportunities for ongoing education and support in diabetes-self management were discussed.    Pt verbalized understanding of concepts discussed and recommendations provided today.       Education Materials Provided:  No new materials provided today    PLAN:  Taking Medication:  Basaglar: 65 to 62 units daily. If bedtime bg < 100, decrease dose to 52 units.   Novolog: okay to continue dosing based on meal size: 6 units for smaller meal, 12 units for larger meal.   Continue taking Actos and Metformin as prescribed.     FOLLOW-UP:  Has return appt with Endo in Dec.    Ongoing plan for education and support: As needed.     Time Spent: 30 minutes  Encounter Type: Individual    Any diabetes medication dose changes were made via the CDE Protocol and Collaborative Practice Agreement with the patient's endocrinology provider. A copy of this encounter was shared with the provider.    Holly Parkrera comes into clinic today at the request of Dr Medrano, Ordering Provider for Pt Teaching and bg/medication review. .    This service provided today was under the supervising provider of the day Dr Guerrero,  who was available if needed.    Mikayla Sellers, RN, BSN, CDE   [facility]

## 2020-10-14 NOTE — PATIENT INSTRUCTIONS
1. Decrease Basaglar dose from 65 to 62 units daily.    2. If bedtime blood sugar is under 100, you can decrease Basaglar dose to 52 units.     3. Okay to continue takingn 6 units Novolog for a smaller meal and 12 units for a larger meal.       Mikayla Sellers RN, BSN, CDE   [facility]

## 2020-10-15 ENCOUNTER — TELEPHONE (OUTPATIENT)
Dept: EDUCATION SERVICES | Facility: CLINIC | Age: 65
End: 2020-10-15

## 2020-10-15 NOTE — TELEPHONE ENCOUNTER
[facility] Call Center    Phone Message    May a detailed message be left on voicemail: yes     Reason for Call: Other: Meredith with [facility] is requesting a call back from Mikayla to discuss metFORMIN (GLUCOPHAGE) 1000 MG tablet [[id number]] (Order [id number])   Meredith will be with the pt for the next 30 mins or so. Please call [phone].

## 2020-10-15 NOTE — TELEPHONE ENCOUNTER
Confirmed with Meredith that patient should continue with Metformin as documented in 10/14/2020 CDE note.    Argelia Maciel LPN  Diabetes Clinic Coordinator   Adult Endocrinology and Pediatric Specialty Clinics  [facility]

## 2020-10-19 NOTE — RESULT ENCOUNTER NOTE
Dear Holly,   Your recent lab results showed the following:  -- iron level improved some but is still low. Iron storage improved some. Hemoglobin is borderline low but stable.   -- Continue iron supplement. Recheck level in 4-6 weeks.   -- send in stool sample to look for source of bleeding/iron loss.     Please call or Mychart to our office if you have further questions.     Tiffany King MD-PhD

## 2020-10-21 ENCOUNTER — TELEPHONE (OUTPATIENT)
Dept: ENDOCRINOLOGY | Facility: CLINIC | Age: 65
End: 2020-10-21

## 2020-10-21 ENCOUNTER — TELEPHONE (OUTPATIENT)
Dept: PEDIATRICS | Facility: CLINIC | Age: 65
End: 2020-10-21

## 2020-10-21 NOTE — TELEPHONE ENCOUNTER
Impression: Presbyopia: H52.4. Plan: Discussed diagnosis with patient. New glasses Rx given today. [facility]    Phone Message    May a detailed message be left on voicemail: yes     [facility] for Call[facility]: 1 time per week skilled nursing visit, for medication set up and diabetic assessment and education.      Action Taken: Message routed to:  Primary Care p [phone]    Travel Screening: Not Applicable

## 2020-10-21 NOTE — TELEPHONE ENCOUNTER
"Pt's Home Health Nurse, Lesvia, called wanting to advise Cde about pt's improved bg levels. Cde met with patient last week. Lesvia states pt's bg readings now ranging from 80 and 300 with most readings being in the \"100's\". States no need to call her back. Just wanted to update Cde on pt's progress.    Mikayla Sellers, RN, BSN, CDE   [facility]  "

## 2020-10-22 NOTE — TELEPHONE ENCOUNTER
"Detailed voice mail left for EVELYN Harper case manager, verbal approval given as requested per \"Treatment and Evaluation Orders for Home Care, Nursing Home or Assisted Living Facilities\" RN Protocol.      Rupali Singer RN      "

## 2020-11-05 DIAGNOSIS — Z53.9 DIAGNOSIS NOT YET DEFINED: Primary | ICD-10-CM

## 2020-11-05 DIAGNOSIS — F33.3 SEVERE RECURRENT MAJOR DEPRESSIVE DISORDER WITH PSYCHOTIC FEATURES (H): ICD-10-CM

## 2020-11-05 PROCEDURE — G0179 MD RECERTIFICATION HHA PT: HCPCS | Performed by: INTERNAL MEDICINE

## 2020-11-09 RX ORDER — CHOLECALCIFEROL (VITAMIN D3) 50 MCG
1 TABLET ORAL DAILY
Qty: 90 TABLET | Refills: 3 | Status: SHIPPED | OUTPATIENT
Start: 2020-11-09 | End: 2021-11-22

## 2020-11-17 ENCOUNTER — TRANSFERRED RECORDS (OUTPATIENT)
Dept: HEALTH INFORMATION MANAGEMENT | Facility: CLINIC | Age: 65
End: 2020-11-17

## 2020-11-17 DIAGNOSIS — J30.2 SEASONAL ALLERGIC RHINITIS, UNSPECIFIED TRIGGER: ICD-10-CM

## 2020-11-19 ENCOUNTER — TELEPHONE (OUTPATIENT)
Dept: PEDIATRICS | Facility: CLINIC | Age: 65
End: 2020-11-19

## 2020-11-19 DIAGNOSIS — M17.10 ARTHRITIS OF KNEE: ICD-10-CM

## 2020-11-19 DIAGNOSIS — M25.562 ARTHRALGIA OF BOTH KNEES: ICD-10-CM

## 2020-11-19 DIAGNOSIS — M25.561 ARTHRALGIA OF BOTH KNEES: ICD-10-CM

## 2020-11-19 DIAGNOSIS — Z53.9 DIAGNOSIS NOT YET DEFINED: Primary | ICD-10-CM

## 2020-11-19 PROCEDURE — G0180 MD CERTIFICATION HHA PATIENT: HCPCS | Performed by: INTERNAL MEDICINE

## 2020-11-19 RX ORDER — TRAMADOL HYDROCHLORIDE 50 MG/1
25 TABLET ORAL 3 TIMES DAILY
Qty: 135 TABLET | Refills: 0 | Status: SHIPPED | OUTPATIENT
Start: 2020-11-24 | End: 2021-01-06

## 2020-11-19 NOTE — TELEPHONE ENCOUNTER
[facility] utilizes an encounter to take the place of a direct phone call to your office. Please take a moment to review the below request. Please reply or route message to author of this encounter.  Message will act as a verbal OK of orders requested below. Thank you.    Fall event  SN  DATE and TIME OF FALL: 11/15/2020 6pm  NUMBER OF FALLS SINCE SOC/ALISIA, WHICHEVER IS MORE RECENT: 1  WITNESSED or UNWITNESSED: unwitnessed  INJURIES:  none  WHAT WAS PATIENT DOING AT TIME OF FALL:  walking from kitchen to livingroom  FALLS PREVENTION INTERVENTIONS WERE PROVIDED TODAY/   teaching to use night lights, cane if feeling weak and request assistance from family when feeling weak.  Education to continue to HEP as instructed.  PATIENT RESPONSE:  She states she was at her daughters and didnt realize the living room was sunken and misstepped.  She states she does her leg lifts every day  ADDITIONAL DISCIPLINES RECOMMENDED:  None - client has previously had PT and has HEP  ALL APPROPRIATE DOCUMENTATION COMPLETED: yes  WHO WAS NOTIFIED: MD

## 2020-11-19 NOTE — TELEPHONE ENCOUNTER
traMADol (ULTRAM) 50 MG tablet   Last Written Prescription Date:  8/26/20  Last Fill Quantity: 135,   # refills: 0  Last Office Visit : 10/7/20  [facility]   Future Office visit:  none    Routing refill request to provider for review/approval because:  controlled.

## 2020-11-20 RX ORDER — CETIRIZINE HYDROCHLORIDE 10 MG/1
10 TABLET ORAL DAILY
Qty: 30 TABLET | Refills: 5 | Status: SHIPPED | OUTPATIENT
Start: 2020-11-20 | End: 2021-08-10

## 2020-11-20 NOTE — TELEPHONE ENCOUNTER
CETIRIZINE HCL 10 MG TABLET   Last Written Prescription Date:  6/4/2020  Last Fill Quantity: 30,   # refills: 5  Last Office Visit : 10/7/2020  Future Office visit:  None  30 Tabs, 5 Refills sent to pharm 11/20/2020    Noy Allan RN  Central Triage Red Flags/Med Refills

## 2020-12-02 ENCOUNTER — NURSE TRIAGE (OUTPATIENT)
Dept: CALL CENTER | Age: 65
End: 2020-12-02

## 2020-12-02 ENCOUNTER — TELEPHONE (OUTPATIENT)
Dept: EDUCATION SERVICES | Facility: CLINIC | Age: 65
End: 2020-12-02

## 2020-12-02 DIAGNOSIS — F32.3 SEVERE MAJOR DEPRESSION WITH PSYCHOTIC FEATURES (H): ICD-10-CM

## 2020-12-02 DIAGNOSIS — E11.65 TYPE 2 DIABETES MELLITUS WITH HYPERGLYCEMIA, WITH LONG-TERM CURRENT USE OF INSULIN (H): Primary | ICD-10-CM

## 2020-12-02 DIAGNOSIS — Z79.4 TYPE 2 DIABETES MELLITUS WITH HYPERGLYCEMIA, WITH LONG-TERM CURRENT USE OF INSULIN (H): Primary | ICD-10-CM

## 2020-12-02 RX ORDER — FLASH GLUCOSE SCANNING READER
EACH MISCELLANEOUS
Qty: 1 EACH | Refills: 0 | Status: SHIPPED | OUTPATIENT
Start: 2020-12-02 | End: 2022-12-29

## 2020-12-02 RX ORDER — FLASH GLUCOSE SENSOR
KIT MISCELLANEOUS
Qty: 6 EACH | Refills: 3 | Status: SHIPPED | OUTPATIENT
Start: 2020-12-02 | End: 2022-03-10

## 2020-12-02 NOTE — TELEPHONE ENCOUNTER
Cde returned pt's call. Home Health nurse, Meredith, was at the patient's home at the time that I called. Meredith visits once a week. Knows the patient well. I spoke on speaker phone to Meredith and pt.     Pt states she has had several low blood sugars, sumit e in the 50's, over the last several weeks. Per patient, they happen at different times of the day. Pt admits to not taking Basaglar at night when her blood sugar is low. She did not take it last night and work up this moring at 140. Pt will also change dose of Basaglar depending on bedtime bg level. Sometimes takes 56u, sometimes 52u, sometimes none. Pt has memory issues, so she is unable to provide a good history regarding dosing. Prescribed dose Basaglar is 62 units daily at night. At visit with Cde in Oct, pt was advised to decrease Basaglar dose from 65 to 62 units and to decrease dose to 52 units if bedtime bg < 100. Verbalized continues to take Novolog as prescribed.      Meredith feels hypoglycemia episodes are happening due to pt eating healthier meals. Since moving in with her daughter pt has been eating fewer sweets and intake of vegetables has increased.     Pt advised to decrease Basaglar to 50 units daily. If bedtime bg < 100, take 40 units. No change to Novolog. Call Cde is hypoglycemia episodes continue.    Meredith requests Rx for Mathieu Cgms be sent t [facility] for pt. Cde agrees.    Mikayla Sellers, RN, BSN, CDE   [facility]

## 2020-12-02 NOTE — TELEPHONE ENCOUNTER
"      Answer Assessment - Initial Assessment Questions  1. SYMPTOMS: \"What symptoms are you concerned about?\"      Low blood sugar  2. ONSET:  \"When did the symptoms start?\"      2 weeks,   3. BLOOD GLUCOSE: \"What is your blood glucose level?\"       Last check- 12-2-20 5:30 AM Blood sugar  145.( Uses- Per ultra meter)  8:45 AM .  12-1-20 check in the afternoon- not sure of exact time: BS 60  4. USUAL RANGE: \"What is your blood glucose level usually?\" (e.g., usual fasting morning value, usual evening value)     Usually in the 100's, 145  5. TYPE 1 or 2:  \"Do you know what type of diabetes you have?\"  (e.g., Type 1, Type 2, Gestational; doesn't know)       Type 2  6. INSULIN: \"Do you take insulin?\" \"What type of insulin(s) do you use? What is the mode of delivery? (syringe, pen (e.g., injection or  pump)       novolog 3 times per day, 15 units each time- does adjust  12-1-20 HAS BEEN TAKING BETWEEN 6-10   Night time one( not sure of name) 56 units  DID NOT TAKE ANY INSULLIN LAST NIGHT AT BEDTIME, CONCERN ABOUT RUNNING LOW BS  HAS NOT TAKEN ANY INSULIN TODAY  7. DIABETES PILLS: \"Do you take any pills for your diabetes?\"      METFORMIN - DID TAKE TODAY  8. OTHER SYMPTOMS: \"Do you have any symptoms?\" (e.g., fever, frequent urination, difficulty breathing, vomiting)       No clamminess or sweating- this is usually has these symptoms when running low- did have these yesterday, took diabetic pill that you can chew on- the ones with sugar.  Denies any sweating, clamminess, light headed, nausea, pain  Drinking water- urine, light yellow, no smell  9. LOW BLOOD GLUCOSE TREATMENT: \"What have you done so far to treat the low blood glucose level?\"     Per above  10. FOOD: \"When did you last eat or drink?\"        Cup of coffee,  11. ALONE: \"Are you alone right now or is someone with you?\"         children  12. PREGNANCY: \"Is there any chance you are pregnant?\" \"When was your last menstrual period?\"        na    Protocols " used: DIABETES - LOW BLOOD SUGAR-A-[facility]: Nurse Triage Note  SITUATION/BACKGROUND                                                      Holly Muir is a 65 year old female who calls with low blood sugar- yesterday  Concern about dosage on current diabetic meds. Did not take last evenings insulin or any today.  Pt # [phone]  Pharmacy [facility] [place]   Endo: Dr. Medrano last visit 6-5-20  Next appt 12-11-20    Allergies:   Allergies   Allergen Reactions     Morphine Sulfate Itching       RECOMMENDATION/PLAN                                                      RECOMMENDED DISPOSITION:  High priority to endo for follow up and recommendation on medication- adjustments. Reviewed home care, when to seek urgent/emergent care per protocol.  Will comply with recommendation: Yes    If further questions/concerns or if symptoms do not improve, worsen or new symptoms develop, call your PCP or [phone] to talk with the Resident on call, as soon as possible.    Guideline used: Diabetes problem  Telephone Triage Protocols for Nurses, Fifth Edition, Rupali Alonso RN

## 2020-12-02 NOTE — TELEPHONE ENCOUNTER
[facility] Call [facility]    Phone Message    May a detailed message be left on voicemail: yes     Reason for Call: Patient called this morning stating her blood sugar has been reading 50/60/70 usually now.  She states this morning it ready 145 but normally it is a lot lower.  Patient transferred to speak to [facility] but wanted to see if Mikayla could adjust her medications and give her call back.  Please advise and call patient.  Thank you.    Action Taken: Message routed to:  Adult Clinics: Endocrinology p [id number]    Travel Screening: Not Applicable

## 2020-12-05 RX ORDER — MULTIVITAMIN WITH FOLIC ACID 400 MCG
1 TABLET ORAL DAILY
Qty: 90 TABLET | Refills: 2 | Status: SHIPPED | OUTPATIENT
Start: 2020-12-05 | End: 2021-11-22

## 2020-12-07 NOTE — BRIEF OP NOTE
[facility] Brief Operative Note    Pre-operative diagnosis: BILATERAL DERMATOCHALSIS   Post-operative diagnosis Same   Procedure: Procedure(s):  BILATERAL UPPER LID BLEPHAROPLASTY   Surgeon(s): Surgeon(s) and Role:     * Randolph Cook MD - Primary   Estimated blood loss: 1cc   Specimens: None   Findings: Bilateral upper lid dermatochalasis      Sofia Laura MD  Oculoplastic Surgery Fellow     Detail Level: Zone Plan: Discussed R/B/SE of Mohs surgery vs. radiation vs. cryosurgical destruction with 5-FU/calcipotriene compound cream as possible treatment options. Lean towards cryosurgical destruction, patient agrees to proceed today. \\n\\nWill start 5-FU/calcipotriene compound cream at NOV.

## 2020-12-11 ENCOUNTER — OFFICE VISIT (OUTPATIENT)
Dept: ENDOCRINOLOGY | Facility: CLINIC | Age: 65
End: 2020-12-11
Payer: MEDICARE

## 2020-12-11 VITALS
SYSTOLIC BLOOD PRESSURE: 112 MMHG | HEART RATE: 71 BPM | OXYGEN SATURATION: 97 % | DIASTOLIC BLOOD PRESSURE: 76 MMHG | WEIGHT: 213 LBS | BODY MASS INDEX: 38.96 KG/M2

## 2020-12-11 DIAGNOSIS — E11.65 TYPE 2 DIABETES MELLITUS WITH HYPERGLYCEMIA, WITH LONG-TERM CURRENT USE OF INSULIN (H): Primary | ICD-10-CM

## 2020-12-11 DIAGNOSIS — Z79.4 TYPE 2 DIABETES MELLITUS WITH HYPERGLYCEMIA, WITH LONG-TERM CURRENT USE OF INSULIN (H): Primary | ICD-10-CM

## 2020-12-11 LAB — HBA1C MFR BLD: 7.9 % (ref 0–5.6)

## 2020-12-11 PROCEDURE — 99214 OFFICE O/P EST MOD 30 MIN: CPT | Performed by: INTERNAL MEDICINE

## 2020-12-11 PROCEDURE — 83036 HEMOGLOBIN GLYCOSYLATED A1C: CPT | Performed by: INTERNAL MEDICINE

## 2020-12-11 NOTE — LETTER
12/11/2020         RE: Holly Muir  [id number] [address] [id number]        Dear Colleague,    Thank you for referring your patient, Holly Muir, to the [facility]. Please see a copy of my visit note below.                                                          - Endocrinology Follow up -    Reason for visit/consult:  Uncontrolled type 2 diabetes mellitus with hyperglycemia, with long-term current use of insulin (H)    Primary care provider: Tiffany King      Assessment and Plan    # DM2, night time eating habits  A1c 7.9 today (12/11/2020), her glucose improved significantly, came with her daughter today and she is watching mother's diet.     - Continue current basaglar 65 units daily  - Continue current Novolog     Novolog 15-15-15 (each meal, depends on frequency of meal )      -Continue current metformin 1000 mg bid  - Continue current Actos 45 mg, body weight stable.     # DM education  Her daughter wants to know more about diabetic foods.   They will make appt with Mikayla.     # Mental health/Depression      # DM complication  Urine microalbumin due (ordered)    -RTC with me in 6 months       Elizabeth Medrano MD  Staff Physician  Endocrinology and Metabolism  License: [id number]      Current Diabetic Regimens:  Basaglar 55 units daily  NovoLog 15 units each meal  Metformin 1000 twice daily  Actos 45 mg daily    Life Style:  Wake up 10-11 am  Breakfast after  Lunch 1-2 pm: sandwich  Dinner 6 pm: mexican fat foods  Bedtime: 9 pm  3 am : wake up and snacks    Exercise:  walking    DM complications:  Retinopathy: due not been for 1-2 years.   Nephropathy: urine microalbumin negative 8/2018 - due  Neuropathy:   Most recent LDL:   LDL Cholesterol Calculated   Date Value Ref Range Status   06/18/2020 26 <100 mg/dL Final     Comment:     Desirable:       <100 mg/dl       Glucose Log: We downloaded her gluco meter and explained to the patient.     Am 242, 2643, 168, 157,  200  Lunch: 169, 190, 263  Evening 100, 275, 165, 98, 100, 164    Interval History as of 12/11/2020 : Patient has been doing well.A1c 7.9 today (12/11/2020), her glucose improved significantly, came with her daughter today and she is watching mother's diet. Interval History as of 6/5/2020 : Fluctuating glucose, lots fast foods nowadays. Medication compliance good .  Interval History as of 9/20/2019 : Follow-up in 4 months, compliant medications.  However she still have nighttime snacking habit every night, when she eat carbohydrate snacks glucose in the morning was 250s.  Light fruits 160-180s.  Patient mentioned depression was worse spring 2019 but currently relatively okay and she has psychiatrist to follow-up.   Interval History as of 5/2019: Patient came by herself she missed appointment, increasing the dose of insulin, mentioned started to have a glucose increase 300 in the fasting in the morning her mental issue.  Seen by new psychiatrist. Visitign nurse once a week.    HPI: A 61 yo female second follow up DM2, she has remote history of colectomy. This is the third visit.   Used to use V go for several month, she was confused the system, worsened A1C, then we switch back to insulin injection regimens.    Currently lantus 38 utnis and novology 6-8-8 and sliding scale and metformin 1000 bid.   Last visit prscribed actos 30 mg daily, however she mentioned, she is not sure which one is actos and she may not taking actos.      Of note,  she has had sleeping issues, which distracts her life significantly. She cannot sleep at night, usually sleeps in the day, during the night, she has been hungry and eating peanuts butters, jelly, fruits etc.   Sometimes she cannot sleep for 2 days.   She has visiting nurse once a week, yesterday the nurse called our clinic that her glucose was 500. Talked with Mikayla, lantus was increased from 38 to 42 units.     Glucose: she forgot to bring glucometer today.     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     Past Surgical History:   Procedure Laterality Date     ARTHROSCOPY KNEE RT/LT      LT     BLEPHAROPLASTY BILATERAL Bilateral 8/16/2019    Procedure: BILATERAL UPPER LID BLEPHAROPLASTY;  Surgeon: Randolph Cook MD;  Location: [facility] OR     BREAST LUMPECTOMY, RT/LT      LT-benign      C EXCIS PTERYGIUM  10/08    [facility]     C STOMACH SURGERY PROCEDURE UNLISTED       CATARACT IOL, RT/LT       COLONOSCOPY  5/10/2012    Procedure:COLONOSCOPY; screening colonoscopy; Surgeon:MILLA VEGA; Location:[facility] OR     COLONOSCOPY WITH CO2 INSUFFLATION N/A 8/2/2019    Procedure: Colonoscopy with CO2 insufflation;  Surgeon: Himanshu Hi MD;  Location: [facility] OR     COLOSTOMY  2000     COLOSTOMY       HC COLONOSCOPY THRU STOMA, DIAGNOSTIC  2002    normal per report-no records available-records destroyed     HYSTERECTOMY, PAP NO LONGER INDICATED      done in [place]-cervical cancer     IMPLANT STIMULATOR SACRAL NERVE STAGE ONE Right 3/10/2015    Procedure: IMPLANT STIMULATOR SACRAL NERVE STAGE ONE;  Surgeon: Teodora Yusuf MD;  Location: [facility] OR     IMPLANT STIMULATOR SACRAL NERVE STAGE TWO Right 3/31/2015    Procedure: IMPLANT STIMULATOR SACRAL NERVE STAGE TWO;  Surgeon: Teodora Yusuf MD;  Location: [facility] OR     IMPLANT STIMULATOR SACRAL NERVE STAGE TWO Right 6/22/2020    Procedure: INSERTION, SACRAL NERVE STIMULATOR, STAGE 2 (replacement of interstim battery);  Surgeon: Teodora Yusuf MD;  Location: [facility] OR     PHACOEMULSIFICATION WITH STANDARD INTRAOCULAR LENS IMPLANT Left  9/26/2019    Procedure: LEFT PHACOEMULSIFICATION, CATARACT, WITH STANDARD IOL INSERTION;  Surgeon: Francesco Winn MD;  Location: [facility]     PHACOEMULSIFICATION WITH STANDARD INTRAOCULAR LENS IMPLANT Right 10/24/2019    Procedure: RIGHT PHACOEMULSIFICATION, CATARACT, WITH STANDARD IOL INSERTION;  Surgeon: Francesco Winn MD;  Location: [facility]     REPAIR PTOSIS Bilateral 08/16/2019     SALPINGO OOPHORECTOMY,R/L/JENNIFER      Salpingo Oophorectomy, RT/LT/JENNIFER       Allergies:  Allergies   Allergen Reactions     Morphine Sulfate Itching       Current Medications   Current Outpatient Medications   Medication     ARIPiprazole (ABILIFY) 30 MG tablet     ARIPiprazole (ABILIFY) 5 MG tablet     ascorbic acid (CVS VITAMIN C) 500 MG TABS     blood glucose (ACCU-CHEK CELIA) test strip     blood glucose monitoring (ACCU-CHEK CELIA PLUS) meter device kit     blood glucose monitoring (ACCU-CHEK FASTCLIX) lancets     buPROPion (WELLBUTRIN XL) 300 MG 24 hr tablet     busPIRone HCl (BUSPAR) 30 MG tablet     cetirizine (ZYRTEC) 10 MG tablet     CVS ASPIRIN ADULT LOW DOSE 81 MG chewable tablet     CVS PAIN RELIEF 500 MG tablet     ferrous gluconate (FERGON) 324 (38 Fe) MG tablet     gabapentin (NEURONTIN) 600 MG tablet     ibuprofen (ADVIL/MOTRIN) 400 MG tablet     insulin aspart (NOVOLOG FLEXPEN) 100 UNIT/ML pen     insulin glargine (BASAGLAR KWIKPEN) 100 UNIT/ML pen     insulin pen needle (31G X 5 MM) 31G X 5 MM miscellaneous     metFORMIN (GLUCOPHAGE) 1000 MG tablet     Multiple Vitamin (DAILY-ISAAC) TABS     nystatin (MYCOSTATIN) 648984 UNIT/GM external powder     omeprazole (PRILOSEC) 20 MG DR capsule     ORDER FOR DME     PARoxetine (PAXIL) 20 MG tablet     pioglitazone (ACTOS) 45 MG tablet     propranolol ER (INDERAL LA) 80 MG 24 hr capsule     ramipril (ALTACE) 5 MG capsule     rOPINIRole (REQUIP) 0.25 MG tablet     simvastatin (ZOCOR) 20 MG tablet     traMADol (ULTRAM) 50 MG tablet     traZODone (DESYREL) 50 MG tablet      vitamin D3 (CHOLECALCIFEROL) 50 mcg (2000 units) tablet     Continuous Blood Gluc  (FREESTYLE CM 14 DAY READER) GALINA     Continuous Blood Gluc Sensor (FREESTYLE CM 14 DAY SENSOR) MISC     order for DME     order for DME     order for DME     order for DME     ORDER FOR DME     UNIVERSAL REMOVER WIPES EX MISC     Current Facility-Administered Medications   Medication     triamcinolone (KENALOG-40) injection 40 mg     triamcinolone (KENALOG-40) injection 40 mg     triamcinolone (KENALOG-40) injection 40 mg     triamcinolone (KENALOG-40) injection 40 mg     triamcinolone (KENALOG-40) injection 40 mg     triamcinolone (KENALOG-40) injection 40 mg     triamcinolone (KENALOG-40) injection 40 mg       Family History:  Family History   Problem Relation Age of Onset     Diabetes Mother      Cerebrovascular Disease Mother      Diabetes Father      Hypertension Father      Cancer Maternal Grandfather      Cancer Paternal Grandfather      Diabetes Brother      Diabetes Sister      Thyroid Disease Daughter      Thyroid Disease Sister      Multiple Sclerosis Daughter      Glaucoma No family hx of      Macular Degeneration No family hx of        Social History:  Social History     Tobacco Use     Smoking status: Former Smoker     Packs/day: 0.00     Quit date: 2016     Years since quittin.3     Smokeless tobacco: Never Used   Substance Use Topics     Alcohol use: Yes     Alcohol/week: 0.0 standard drinks     Comment: social occasions   [facility] care [facility] 11 and 18 year grand children [facility]     ROS:  Full review of systems taken with the help of the intake sheet. Otherwise a complete 14 point review of systems was taken and is negative unless stated in the history above.    Physical Exam:   Blood pressure 112/76, pulse 71, weight 96.6 kg (213 lb), SpO2 97 %, not currently breastfeeding.  General: well appearing, no acute distress, pleasant and conversant,   Mental Status/neuro: alert and  oriented  Face: symmetrical, normal facial color  Eyes: anicteric, no proptosis or lid lag  Resp: no acute distress        Labs (General):                 Again, thank you for allowing me to participate in the care of your patient.        Sincerely,        Elizabeth Medrano MD

## 2020-12-11 NOTE — NURSING NOTE
Holly Muir's goals for this visit include:   Chief Complaint   Patient presents with     Diabetes     She requests these members of her care team be copied on today's visit information: Yes    PCP: Tiffany King    Referring Provider:  Tiffany King MD PhD  [address]  [place] [address]    /76 (BP Location: Left arm, Patient Position: Sitting, Cuff Size: Adult Large)   Pulse 71   Wt 96.6 kg (213 lb)   SpO2 97%   BMI 38.96 kg/m      Do you need any medication refills at today's visit? No

## 2020-12-11 NOTE — PROGRESS NOTES
- Endocrinology Follow up -    Reason for visit/consult:  Uncontrolled type 2 diabetes mellitus with hyperglycemia, with long-term current use of insulin (H)    Primary care provider: Tiffany King      Assessment and Plan    # DM2, night time eating habits  A1c 7.9 today (12/11/2020), her glucose improved significantly, came with her daughter today and she is watching mother's diet.     - Continue current basaglar 65 units daily  - Continue current Novolog     Novolog 15-15-15 (each meal, depends on frequency of meal )      -Continue current metformin 1000 mg bid  - Continue current Actos 45 mg, body weight stable.     # DM education  Her daughter wants to know more about diabetic foods.   They will make appt with Mikayla.     # Mental health/Depression      # DM complication  Urine microalbumin due (ordered)    -RTC with me in 6 months       Elizabeth Medrano MD  Staff Physician  Endocrinology and Metabolism  License: [id number]      Current Diabetic Regimens:  Basaglar 55 units daily  NovoLog 15 units each meal  Metformin 1000 twice daily  Actos 45 mg daily    Life Style:  Wake up 10-11 am  Breakfast after  Lunch 1-2 pm: sandwich  Dinner 6 pm: mexican fat foods  Bedtime: 9 pm  3 am : wake up and snacks    Exercise:  walking    DM complications:  Retinopathy: due not been for 1-2 years.   Nephropathy: urine microalbumin negative 8/2018 - due  Neuropathy:   Most recent LDL:   LDL Cholesterol Calculated   Date Value Ref Range Status   06/18/2020 26 <100 mg/dL Final     Comment:     Desirable:       <100 mg/dl       Glucose Log: We downloaded her gluco meter and explained to the patient.     Am 242, 2643, 168, 157, 200  Lunch: 169, 190, 263  Evening 100, 275, 165, 98, 100, 164    Interval History as of 12/11/2020 : Patient has been doing well.A1c 7.9 today (12/11/2020), her glucose improved significantly, came with her daughter today and she is watching mother's diet. Interval  History as of 6/5/2020 : Fluctuating glucose, lots fast foods nowadays. Medication compliance good .  Interval History as of 9/20/2019 : Follow-up in 4 months, compliant medications.  However she still have nighttime snacking habit every night, when she eat carbohydrate snacks glucose in the morning was 250s.  Light fruits 160-180s.  Patient mentioned depression was worse spring 2019 but currently relatively okay and she has psychiatrist to follow-up.   Interval History as of 5/2019: Patient came by herself she missed appointment, increasing the dose of insulin, mentioned started to have a glucose increase 300 in the fasting in the morning her mental issue.  Seen by new psychiatrist. Visitign nurse once a week.    HPI: A 63 yo female second follow up DM2, she has remote history of colectomy. This is the third visit.   Used to use V go for several month, she was confused the system, worsened A1C, then we switch back to insulin injection regimens.    Currently lantus 38 utnis and novology 6-8-8 and sliding scale and metformin 1000 bid.   Last visit prscribed actos 30 mg daily, however she mentioned, she is not sure which one is actos and she may not taking actos.      Of note,  she has had sleeping issues, which distracts her life significantly. She cannot sleep at night, usually sleeps in the day, during the night, she has been hungry and eating peanuts butters, jelly, fruits etc.   Sometimes she cannot sleep for 2 days.   She has visiting nurse once a week, yesterday the nurse called our clinic that her glucose was 500. Talked with Mikayla, lantus was increased from 38 to 42 units.     Glucose: she forgot to bring glucometer today.     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     Past Surgical History:   Procedure Laterality Date     ARTHROSCOPY KNEE RT/LT      LT     BLEPHAROPLASTY BILATERAL Bilateral 8/16/2019    Procedure: BILATERAL UPPER LID BLEPHAROPLASTY;  Surgeon: Randolph Cook MD;  Location: [facility]     BREAST LUMPECTOMY, RT/LT      LT-benign      C EXCIS PTERYGIUM  10/08    [facility]     C STOMACH SURGERY PROCEDURE UNLISTED       CATARACT IOL, RT/LT       COLONOSCOPY  5/10/2012    Procedure:COLONOSCOPY; screening colonoscopy; Surgeon:MILLA VEGA; Location:[facility]     COLONOSCOPY WITH CO2 INSUFFLATION N/A 8/2/2019    Procedure: Colonoscopy with CO2 insufflation;  Surgeon: Himanshu Hi MD;  Location: [facility]     COLOSTOMY  2000     COLOSTOMY       HC COLONOSCOPY THRU STOMA, DIAGNOSTIC  2002    normal per report-no records available-records destroyed     HYSTERECTOMY, PAP NO LONGER INDICATED      done in [place]-cervical cancer     IMPLANT STIMULATOR SACRAL NERVE STAGE ONE Right 3/10/2015    Procedure: IMPLANT STIMULATOR SACRAL NERVE STAGE ONE;  Surgeon: Teodora Yusuf MD;  [facility]     IMPLANT STIMULATOR SACRAL NERVE STAGE TWO Right 3/31/2015    Procedure: IMPLANT STIMULATOR SACRAL NERVE STAGE TWO;  Surgeon: Teodora Yusuf MD;  [facility]     IMPLANT STIMULATOR SACRAL NERVE STAGE TWO Right 6/22/2020    Procedure: INSERTION, SACRAL NERVE STIMULATOR, STAGE 2 (replacement of interstim battery);  Surgeon: Teodora Yusuf MD;  Location: [facility]     PHACOEMULSIFICATION WITH STANDARD INTRAOCULAR LENS IMPLANT Left 9/26/2019    Procedure: LEFT PHACOEMULSIFICATION, CATARACT, WITH STANDARD IOL INSERTION;  Surgeon: Francesco Winn MD;  Location: [facility]     PHACOEMULSIFICATION WITH STANDARD INTRAOCULAR LENS IMPLANT Right 10/24/2019    Procedure: RIGHT PHACOEMULSIFICATION,  CATARACT, WITH STANDARD IOL INSERTION;  Surgeon: Francesco Winn MD;  Location: [facility]     REPAIR PTOSIS Bilateral 08/16/2019     SALPINGO OOPHORECTOMY,R/L/JENNIFER      Salpingo Oophorectomy, RT/LT/JENNIFER       Allergies:  Allergies   Allergen Reactions     Morphine Sulfate Itching       Current Medications   Current Outpatient Medications   Medication     ARIPiprazole (ABILIFY) 30 MG tablet     ARIPiprazole (ABILIFY) 5 MG tablet     ascorbic acid (CVS VITAMIN C) 500 MG TABS     blood glucose (ACCU-CHEK CELIA) test strip     blood glucose monitoring (ACCU-CHEK CELIA PLUS) meter device kit     blood glucose monitoring (ACCU-CHEK FASTCLIX) lancets     buPROPion (WELLBUTRIN XL) 300 MG 24 hr tablet     busPIRone HCl (BUSPAR) 30 MG tablet     cetirizine (ZYRTEC) 10 MG tablet     CVS ASPIRIN ADULT LOW DOSE 81 MG chewable tablet     CVS PAIN RELIEF 500 MG tablet     ferrous gluconate (FERGON) 324 (38 Fe) MG tablet     gabapentin (NEURONTIN) 600 MG tablet     ibuprofen (ADVIL/MOTRIN) 400 MG tablet     insulin aspart (NOVOLOG FLEXPEN) 100 UNIT/ML pen     insulin glargine (BASAGLAR KWIKPEN) 100 UNIT/ML pen     insulin pen needle (31G X 5 MM) 31G X 5 MM miscellaneous     metFORMIN (GLUCOPHAGE) 1000 MG tablet     Multiple Vitamin (DAILY-ISAAC) TABS     nystatin (MYCOSTATIN) 666076 UNIT/GM external powder     omeprazole (PRILOSEC) 20 MG DR capsule     ORDER FOR DME     PARoxetine (PAXIL) 20 MG tablet     pioglitazone (ACTOS) 45 MG tablet     propranolol ER (INDERAL LA) 80 MG 24 hr capsule     ramipril (ALTACE) 5 MG capsule     rOPINIRole (REQUIP) 0.25 MG tablet     simvastatin (ZOCOR) 20 MG tablet     traMADol (ULTRAM) 50 MG tablet     traZODone (DESYREL) 50 MG tablet     vitamin D3 (CHOLECALCIFEROL) 50 mcg (2000 units) tablet     Continuous Blood Gluc  (FREESTYLE CM 14 DAY READER) GALINA     Continuous Blood Gluc Sensor (FREESTYLE CM 14 DAY SENSOR) MISC     order for DME     order for DME     order for DME     order  for DME     ORDER FOR DME     UNIVERSAL REMOVER WIPES EX MISC     Current Facility-Administered Medications   Medication     triamcinolone (KENALOG-40) injection 40 mg     triamcinolone (KENALOG-40) injection 40 mg     triamcinolone (KENALOG-40) injection 40 mg     triamcinolone (KENALOG-40) injection 40 mg     triamcinolone (KENALOG-40) injection 40 mg     triamcinolone (KENALOG-40) injection 40 mg     triamcinolone (KENALOG-40) injection 40 mg       Family History:  Family History   Problem Relation Age of Onset     Diabetes Mother      Cerebrovascular Disease Mother      Diabetes Father      Hypertension Father      Cancer Maternal Grandfather      Cancer Paternal Grandfather      Diabetes Brother      Diabetes Sister      Thyroid Disease Daughter      Thyroid Disease Sister      Multiple Sclerosis Daughter      Glaucoma No family hx of      Macular Degeneration No family hx of        Social History:  Social History     Tobacco Use     Smoking status: Former Smoker     Packs/day: 0.00     Quit date: 2016     Years since quittin.3     Smokeless tobacco: Never Used   Substance Use Topics     Alcohol use: Yes     Alcohol/week: 0.0 standard drinks     Comment: social occasions   taking care of 11 and 18 year grand children [facility]     ROS:  Full review of systems taken with the help of the intake sheet. Otherwise a complete 14 point review of systems was taken and is negative unless stated in the history above.    Physical Exam:   Blood pressure 112/76, pulse 71, weight 96.6 kg (213 lb), SpO2 97 %, not currently breastfeeding.  General: well appearing, no acute distress, pleasant and conversant,   Mental Status/neuro: alert and oriented  Face: symmetrical, normal facial color  Eyes: anicteric, no proptosis or lid lag  Resp: no acute distress        Labs (General):

## 2020-12-14 ENCOUNTER — OFFICE VISIT (OUTPATIENT)
Dept: ORTHOPEDICS | Facility: CLINIC | Age: 65
End: 2020-12-14
Payer: MEDICARE

## 2020-12-14 ENCOUNTER — TELEPHONE (OUTPATIENT)
Dept: ENDOCRINOLOGY | Facility: CLINIC | Age: 65
End: 2020-12-14

## 2020-12-14 VITALS — BODY MASS INDEX: 39.2 KG/M2 | WEIGHT: 213 LBS | HEIGHT: 62 IN

## 2020-12-14 DIAGNOSIS — G89.29 CHRONIC BILATERAL LOW BACK PAIN WITHOUT SCIATICA: ICD-10-CM

## 2020-12-14 DIAGNOSIS — M54.50 CHRONIC BILATERAL LOW BACK PAIN WITHOUT SCIATICA: ICD-10-CM

## 2020-12-14 DIAGNOSIS — M17.11 PRIMARY OSTEOARTHRITIS OF RIGHT KNEE: ICD-10-CM

## 2020-12-14 DIAGNOSIS — E11.42 DIABETIC POLYNEUROPATHY ASSOCIATED WITH TYPE 2 DIABETES MELLITUS (H): ICD-10-CM

## 2020-12-14 DIAGNOSIS — M17.12 PRIMARY OSTEOARTHRITIS OF LEFT KNEE: ICD-10-CM

## 2020-12-14 DIAGNOSIS — L03.113 CELLULITIS OF RIGHT UPPER ARM: Primary | ICD-10-CM

## 2020-12-14 PROCEDURE — 20610 DRAIN/INJ JOINT/BURSA W/O US: CPT | Mod: 50 | Performed by: FAMILY MEDICINE

## 2020-12-14 PROCEDURE — 99214 OFFICE O/P EST MOD 30 MIN: CPT | Mod: 25 | Performed by: FAMILY MEDICINE

## 2020-12-14 RX ORDER — CEPHALEXIN 500 MG/1
500 CAPSULE ORAL 2 TIMES DAILY
Qty: 20 CAPSULE | Refills: 0 | Status: SHIPPED | OUTPATIENT
Start: 2020-12-14 | End: 2020-12-24

## 2020-12-14 RX ORDER — TRIAMCINOLONE ACETONIDE 40 MG/ML
40 INJECTION, SUSPENSION INTRA-ARTICULAR; INTRAMUSCULAR
Status: DISCONTINUED | OUTPATIENT
Start: 2020-12-14 | End: 2022-09-14

## 2020-12-14 RX ADMIN — TRIAMCINOLONE ACETONIDE 40 MG: 40 INJECTION, SUSPENSION INTRA-ARTICULAR; INTRAMUSCULAR at 13:05

## 2020-12-14 ASSESSMENT — MIFFLIN-ST. JEOR: SCORE: 1469.41

## 2020-12-14 NOTE — PROGRESS NOTES
"HISTORY OF PRESENT ILLNESS  Ms. Muir is a pleasant 65 year old female following up with bilateral knee osteoarthritis and low back pain.  Holly last saw me about 3 months ago.  At that visit I injected both of her knees, this did help until a couple of weeks ago.  Unfortunately her back continues to bother her.  She has low back pain that is focal in nature, worse when she stands or sits for long periods of time.  Her back pain is not radiating down her legs.  She is interested in a repeat injection, she did have a injection in October that was done at the bilateral facet joints at L4-5.  She reports getting little to 0 relief with this.  She also brings up another issue.  She had a shingles vaccine a few days ago, the portion of her arm where she received the vaccine has become red, warm, and somewhat swollen.  Her daughter, who is her caregiver and accompanies her to her appointment today, traced this area yesterday with a marker.  Notably, she denies any fever or chills, no other systemic symptoms.     PHYSICAL EXAM  Vitals:    12/14/20 1253   Weight: 96.6 kg (213 lb)   Height: 1.575 m (5' 2\")     General  - normal appearance, in no obvious distress  HEENT  - conjunctivae not injected, moist mucous membranes  CV  - normal peripheral perfusion  Pulm  - normal respiratory pattern, non-labored  Musculoskeletal - lumbar spine  - stance: slow to rise and sit  - inspection: normal bone and joint alignment, no obvious scoliosis  - palpation: bilateral lumbar paravertebral spasm  - ROM: pain with bilateral rotation, flexion past 30 deg, extension    Musculoskeletal - right upper arm  - inspection: Erythema over lateral arm  - palpation: no bony or soft tissue tenderness, normal clavicle, non-tender AC    Neuro  - patellar and Achilles DTRs 2+ bilaterally, no sensory or motor deficit, grossly normal coordination, normal muscle tone  Skin  - erythema as above that is approximately 6 x 5 cm and extends beyond the " demarcated marker line, slightly warm, no fluctuance or purulence  Psych  - interactive, appropriate, normal mood and affect        ASSESSMENT & PLAN  Ms. Muir is a 65 year old female following up with bilateral knee osteoarthritis and low back pain.  She also has right upper arm cellulitis.    I again reviewed her lumbar spine CT in the room with her and her daughter.  This does reveal bilateral foraminal stenosis and central stenosis at L4-5 and L5-S1, in addition to facet arthropathy at L4-5 and L5-S1.    I am referring her for a repeat back injection, this 1 being a facet injection at L5-S1, bilaterally.  Hopefully this can be diagnostic and therapeutic to some degree as well.    She does have an upper arm cellulitis, likely from her vaccine.  I am prescribing her Keflex, I do encourage her to trace this area daily to monitor the spread.  If she is experiencing an increase in size in the next 48 hours I encouraged her to either contact her family doctor or go to the emergency room or urgent care office.    Lastly, Holly and I revisited our discussion centering around the spectrum of treatment options for osteoarthritis that preclude surgery.  We discussed nonoperative treatment with pain relievers, icing, low impact exercise, and weight loss.  We also talked about the role of injection therapy with corticosteroids.  I did repeat her knee injections today (see procedure note).    I did give her a handicap placard, at her request, for driving.    It was a pleasure seeing Holly.        Santy Harrington DO, CAKEVANM      This note was constructed using Dragon dictation software, please excuse any minor errors in spelling, grammar, or syntax.        [facility] FOLLOW-UP VISIT 12/14/2020    Holly Muir's chief complaint for this visit includes:  Chief Complaint   Patient presents with     RECHECK     bilateral knee cortisone injections and to dicuss low back injection      PCP: Tiffany King    Referring  Provider:        Interval History:     Follow up reason: bilateral knee pain and low back pain     Following Therapeutic Plan: Yes     Pain: Worsening    Function: Worsening      Medical History:    Any recent changes to your medical history? No    Any new medication prescribed since last visit? No    Review of Systems:    Do you have fever, chills, weight loss? No    Do you have any vision problems? No    Do you have any chest pain or edema? No    Do you have any shortness of breath or wheezing?  No    Do you have stomach problems? No    Do you have any urinary track issues? No    Do you have any numbness or focal weakness? No    Do you have diabetes? No    Do you have problems with bleeding or clotting? No    Do you have an rashes or other skin lesions? No    Large Joint Injection/Arthocentesis: bilateral knee    Date/Time: 12/14/2020 1:05 PM  Performed by: Santy Harrington DO  Authorized by: Santy Harrington DO     Indications:  Pain  Needle Size:  22 G  Guidance: landmark guided    Approach:  Lateral  Location:  Knee  Laterality:  Bilateral      Medications (Right):  40 mg triamcinolone 40 MG/ML  Medications (Left):  40 mg triamcinolone 40 MG/ML  Outcome:  Tolerated well, no immediate complications  Procedure discussed: discussed risks, benefits, and alternatives    Consent Given by:  Patient  Timeout: timeout called immediately prior to procedure    Prep: patient was prepped and draped in usual sterile fashion       PROCEDURE    Knee Injection - Intraarticular    The patient was informed of the risks and the benefits of the procedure and a written consent was signed.    The patient s left knee was prepped with chlorhexidine in sterile fashion.   40 mg of triamcinolone suspension was drawn up into a 5 mL syringe with 4 mL of 1% lidocaine.  Injection was performed using substerile technique.  A 1.5-inch 22-gauge needle was used to enter the lateral aspect of the left knee.  Injection performed successfully  without difficulty.  There were no complications. The patient tolerated the procedure well. There was negligible bleeding.     The patient's right knee was prepped with chlorhexidine in sterile fashion.   40 mg of triamcinolone suspension was drawn up into a 5 mL syringe with 4 mL of 1% lidocaine.  Injection was performed using substerile technique.  A 1.5-inch 22-gauge needle was used to enter the lateral aspect of the right knee.  Injection performed successfully without difficulty.  There were no complications. The patient tolerated the procedure well. There was negligible bleeding.

## 2020-12-14 NOTE — TELEPHONE ENCOUNTER
We are currently unable to fill the Freestyle Mathieu for the patient due to not meeting Medicare Guidelines. In order to meet Medicare guidelines, the patient would need to be testing their blood glucose at least 4 times daily. Clinic notes does not state patient's testing regimen, please addend the notes for review.    We will also need a Freestyle Mathieu CMN completed and uploaded to the media tab/faxed to us.     [email or web address]      Thank you!    [facility]  Phone: [phone]  Fax: [phone]

## 2020-12-14 NOTE — LETTER
"    12/14/2020         RE: Holly Muir  [address] [address]        Dear Colleague,    Thank you for referring your patient, Holly Muir, to the [facility] [place]. Please see a copy of my visit note below.    HISTORY OF PRESENT ILLNESS  Ms. Muir is a pleasant 65 year old female following up with bilateral knee osteoarthritis and low back pain.  Holly last saw me about 3 months ago.  At that visit I injected both of her knees, this did help until a couple of weeks ago.  Unfortunately her back continues to bother her.  She has low back pain that is focal in nature, worse when she stands or sits for long periods of time.  Her back pain is not radiating down her legs.  She is interested in a repeat injection, she did have a injection in October that was done at the bilateral facet joints at L4-5.  She reports getting little to 0 relief with this.  She also brings up another issue.  She had a shingles vaccine a few days ago, the portion of her arm where she received the vaccine has become red, warm, and somewhat swollen.  Her daughter, who is her caregiver and accompanies her to her appointment today, traced this area yesterday with a marker.  Notably, she denies any fever or chills, no other systemic symptoms.     PHYSICAL EXAM  Vitals:    12/14/20 1253   Weight: 96.6 kg (213 lb)   Height: 1.575 m (5' 2\")     General  - normal appearance, in no obvious distress  HEENT  - conjunctivae not injected, moist mucous membranes  CV  - normal peripheral perfusion  Pulm  - normal respiratory pattern, non-labored  Musculoskeletal - lumbar spine  - stance: slow to rise and sit  - inspection: normal bone and joint alignment, no obvious scoliosis  - palpation: bilateral lumbar paravertebral spasm  - ROM: pain with bilateral rotation, flexion past 30 deg, extension    Musculoskeletal - right upper arm  - inspection: Erythema over lateral arm  - palpation: no " bony or soft tissue tenderness, normal clavicle, non-tender AC    Neuro  - patellar and Achilles DTRs 2+ bilaterally, no sensory or motor deficit, grossly normal coordination, normal muscle tone  Skin  - erythema as above that is approximately 6 x 5 cm and extends beyond the demarcated marker line, slightly warm, no fluctuance or purulence  Psych  - interactive, appropriate, normal mood and affect        ASSESSMENT & PLAN  Ms. Muir is a 65 year old female following up with bilateral knee osteoarthritis and low back pain.  She also has right upper arm cellulitis.    I again reviewed her lumbar spine CT in the room with her and her daughter.  This does reveal bilateral foraminal stenosis and central stenosis at L4-5 and L5-S1, in addition to facet arthropathy at L4-5 and L5-S1.    I am referring her for a repeat back injection, this 1 being a facet injection at L5-S1, bilaterally.  Hopefully this can be diagnostic and therapeutic to some degree as well.    She does have an upper arm cellulitis, likely from her vaccine.  I am prescribing her Keflex, I do encourage her to trace this area daily to monitor the spread.  If she is experiencing an increase in size in the next 48 hours I encouraged her to either contact her family doctor or go to the emergency room or urgent care office.    Lastly, Holly and I revisited our discussion centering around the spectrum of treatment options for osteoarthritis that preclude surgery.  We discussed nonoperative treatment with pain relievers, icing, low impact exercise, and weight loss.  We also talked about the role of injection therapy with corticosteroids.  I did repeat her knee injections today (see procedure note).    I did give her a handicap placard, at her request, for driving.    It was a pleasure seeing Holly.        Santy Harrington DO, CAM      This note was constructed using Dragon dictation software, please excuse any minor errors in spelling, grammar, or syntax.         [facility] FOLLOW-UP VISIT 12/14/2020    Holly Muir's chief complaint for this visit includes:  Chief Complaint   Patient presents with     RECHECK     bilateral knee cortisone injections and to dicuss low back injection      PCP: Tiffany King    Referring Provider:        Interval History:     Follow up reason: bilateral knee pain and low back pain     Following Therapeutic Plan: Yes     Pain: Worsening    Function: Worsening      Medical History:    Any recent changes to your medical history? No    Any new medication prescribed since last visit? No    Review of Systems:    Do you have fever, chills, weight loss? No    Do you have any vision problems? No    Do you have any chest pain or edema? No    Do you have any shortness of breath or wheezing?  No    Do you have stomach problems? No    Do you have any urinary track issues? No    Do you have any numbness or focal weakness? No    Do you have diabetes? No    Do you have problems with bleeding or clotting? No    Do you have an rashes or other skin lesions? No    Large Joint Injection/Arthocentesis: bilateral knee    Date/Time: 12/14/2020 1:05 PM  Performed by: Santy Harrington DO  Authorized by: Santy Harrington DO     Indications:  Pain  Needle Size:  22 G  Guidance: landmark guided    Approach:  Lateral  Location:  Knee  Laterality:  Bilateral      Medications (Right):  40 mg triamcinolone 40 MG/ML  Medications (Left):  40 mg triamcinolone 40 MG/ML  Outcome:  Tolerated well, no immediate complications  Procedure discussed: discussed risks, benefits, and alternatives    Consent Given by:  Patient  Timeout: timeout called immediately prior to procedure    Prep: patient was prepped and draped in usual sterile fashion       PROCEDURE    Knee Injection - Intraarticular    The patient was informed of the risks and the benefits of the procedure and a written consent was signed.    The patient s left knee was prepped with chlorhexidine in sterile  fashion.   40 mg of triamcinolone suspension was drawn up into a 5 mL syringe with 4 mL of 1% lidocaine.  Injection was performed using substerile technique.  A 1.5-inch 22-gauge needle was used to enter the lateral aspect of the left knee.  Injection performed successfully without difficulty.  There were no complications. The patient tolerated the procedure well. There was negligible bleeding.     The patient's right knee was prepped with chlorhexidine in sterile fashion.   40 mg of triamcinolone suspension was drawn up into a 5 mL syringe with 4 mL of 1% lidocaine.  Injection was performed using substerile technique.  A 1.5-inch 22-gauge needle was used to enter the lateral aspect of the right knee.  Injection performed successfully without difficulty.  There were no complications. The patient tolerated the procedure well. There was negligible bleeding.               Again, thank you for allowing me to participate in the care of your patient.        Sincerely,        Santy Harrington DO

## 2020-12-14 NOTE — TELEPHONE ENCOUNTER
Writer reviewed BG log. Average BG 2-3 daily.    Writer notified patient to check BG four times daily for 30 days.     Patient will contact clinic to have meter downloaded if able to check BG as required.    Argelia Maciel LPN  Diabetes Clinic Coordinator   [facility]  [facility]

## 2020-12-21 ENCOUNTER — TELEPHONE (OUTPATIENT)
Dept: PEDIATRICS | Facility: CLINIC | Age: 65
End: 2020-12-21

## 2020-12-21 NOTE — TELEPHONE ENCOUNTER
[facility] utilizes an encounter to take the place of a direct phone call to your office. Please take a moment to review the below request. Please reply or route message to author of this encounter.  Message will act as a verbal OK of orders requested below. Thank you.    ORDER  Requesting recertification orders.  Goal is to decrease services over the next 60 days and teach a caregiver to assume medication set up/mgmt tasks.  Client will be moving out to her daughters in [place], [place] sometime after the first of January.  She will be getting PCA hours provided by her family, who will assume responsibility for all patient needs.  At this time, family will need to be educated on safe and accurate medication set up, they have not been involved as of yet.  Plan for SN every other week over the next 60 days, client will be discharged from  services when her daughters are able to demonstrate appropriate medication set up.  EVELYN Orellana  [phone]  [email or web address]    MD SUMMARY/PLAN OF CARE    DISCHARGE SUMMARY

## 2020-12-27 RX ORDER — ASPIRIN 81 MG/1
81 TABLET, CHEWABLE ORAL DAILY
Qty: 90 TABLET | Refills: 3 | Status: SHIPPED | OUTPATIENT
Start: 2020-12-27 | End: 2022-02-22

## 2020-12-30 DIAGNOSIS — Z11.59 ENCOUNTER FOR SCREENING FOR OTHER VIRAL DISEASES: Primary | ICD-10-CM

## 2021-01-05 ENCOUNTER — DOCUMENTATION ONLY (OUTPATIENT)
Dept: LAB | Facility: CLINIC | Age: 66
End: 2021-01-05

## 2021-01-06 ENCOUNTER — TELEPHONE (OUTPATIENT)
Dept: PEDIATRICS | Facility: CLINIC | Age: 66
End: 2021-01-06

## 2021-01-06 ENCOUNTER — VIRTUAL VISIT (OUTPATIENT)
Dept: PEDIATRICS | Facility: CLINIC | Age: 66
End: 2021-01-06
Payer: MEDICARE

## 2021-01-06 DIAGNOSIS — M25.561 ARTHRALGIA OF BOTH KNEES: Primary | ICD-10-CM

## 2021-01-06 DIAGNOSIS — M25.562 ARTHRALGIA OF BOTH KNEES: Primary | ICD-10-CM

## 2021-01-06 DIAGNOSIS — E11.65 TYPE II OR UNSPECIFIED TYPE DIABETES MELLITUS WITH RENAL MANIFESTATIONS, UNCONTROLLED(250.42) (H): Primary | ICD-10-CM

## 2021-01-06 DIAGNOSIS — M17.10 ARTHRITIS OF KNEE: ICD-10-CM

## 2021-01-06 DIAGNOSIS — E11.649 UNCONTROLLED TYPE 2 DIABETES MELLITUS WITH HYPOGLYCEMIA WITHOUT COMA (H): ICD-10-CM

## 2021-01-06 DIAGNOSIS — E11.29 TYPE II OR UNSPECIFIED TYPE DIABETES MELLITUS WITH RENAL MANIFESTATIONS, UNCONTROLLED(250.42) (H): Primary | ICD-10-CM

## 2021-01-06 DIAGNOSIS — R21 SKIN RASH: ICD-10-CM

## 2021-01-06 DIAGNOSIS — E16.2 HYPOGLYCEMIA: ICD-10-CM

## 2021-01-06 LAB — HBA1C MFR BLD: 7.7 % (ref 0–5.6)

## 2021-01-06 PROCEDURE — 99442 PR PHYSICIAN TELEPHONE EVALUATION 11-20 MIN: CPT | Mod: 95 | Performed by: INTERNAL MEDICINE

## 2021-01-06 PROCEDURE — 83036 HEMOGLOBIN GLYCOSYLATED A1C: CPT | Performed by: INTERNAL MEDICINE

## 2021-01-06 PROCEDURE — 36415 COLL VENOUS BLD VENIPUNCTURE: CPT | Performed by: INTERNAL MEDICINE

## 2021-01-06 RX ORDER — TRAMADOL HYDROCHLORIDE 50 MG/1
50 TABLET ORAL 3 TIMES DAILY
Refills: 0 | COMMUNITY
Start: 2021-01-06 | End: 2021-01-21

## 2021-01-06 RX ORDER — NYSTATIN 100000 [USP'U]/G
POWDER TOPICAL
Qty: 60 G | Refills: 3 | Status: SHIPPED | OUTPATIENT
Start: 2021-01-06 | End: 2023-05-31

## 2021-01-06 NOTE — PROGRESS NOTES
Holly Muir is a 65 year old female who is being evaluated via a billable telephone visit.      What phone number would you like to be contacted at? [phone]  How would you like to obtain your AVS? Mail a copy  Assessment & Plan   Holly was seen today for diabetes.    Diagnoses and all orders for this visit:    Arthralgia of both knees    Skin rash  -     nystatin (MYCOSTATIN) 940894 UNIT/GM external powder; Apply 1 dose topically 3 times daily as needed    Arthritis of knee    Hypoglycemia    Uncontrolled type 2 diabetes mellitus with hypoglycemia without coma (H)        Diabetes with hypoglycemia: defer management to endocrine. I will forward this to Mikayla Sellers and Dr. Medrano.    Chronic back/knee pain: increase tramadol to 50 mg tid. Pt has home care nurse to call us and let us know how many tabs she has left to determine next refill and to update dose increase.     Return in about 3 months (around 4/6/2021) for Clinic follow up. to review chronic pain management.     Tiffany King MD PhD  [facility]     Holly Muir is a 65 year old who presents to clinic today for the following health issues     HPI       Diabetes Follow-up    How often are you checking your blood sugar? Four or more times daily  Blood sugar testing frequency justification:  Risk of hypoglycemia with medication(s)  What time of day are you checking your blood sugars (select all that apply)?  Before meals  Have you had any blood sugars above 200?  No  Have you had any blood sugars below 70?  Yes 55 Happens daily    What symptoms do you notice when your blood sugar is low?  Shaky, Dizzy, Weak, Lethargy, Blurred vision, Confusion and Nausea    What concerns do you have today about your diabetes? Low blood sugar     Do you have any of these symptoms? (Select all that apply)  Numbness in feet, Burning in feet and Blurry vision    Have you had a diabetic eye exam in the last 12 months? No        BP  Readings from Last 2 Encounters:   12/11/20 112/76   10/06/20 128/81     Hemoglobin A1C (%)   Date Value   01/06/2021 7.7 (H)   12/11/2020 7.9 (A)     LDL Cholesterol Calculated (mg/dL)   Date Value   06/18/2020 26   12/14/2018 25         How many servings of fruits and vegetables do you eat daily?  0-1    On average, how many sweetened beverages do you drink each day (Examples: soda, juice, sweet tea, etc.  Do NOT count diet or artificially sweetened beverages)?   1 orange juice when low sugar    How many days per week do you exercise enough to make your heart beat faster? 3 or less    How many minutes a day do you exercise enough to make your heart beat faster? 9 or less  How many days per week do you miss taking your medication? 2    What makes it hard for you to take your medications?  remembering to take    Has had low glucose in the 50s, 3 times a week, usually in the morning before breakfast.       She normally sees Dr. Medrano in endocrine for diabetes management and Mikayla Sellers our diabetes educator for insulin titration. Her most recent visit was 12/17/2020.    Pt reported severe back pain, workign with sport smedicine doctor. Will be getting epidural injection. Currently takes 1/2 tab of tramadol tid but this is not helping.           Objective           Vitals:  No vitals were obtained today due to virtual visit.    Physical Exam   healthy, alert and no distress  PSYCH: Alert and oriented times 3; coherent speech, normal   rate and volume, able to articulate logical thoughts, able   to abstract reason, no tangential thoughts, no hallucinations   or delusions  Her affect is normal  RESP: No cough, no audible wheezing, able to talk in full sentences  Remainder of exam unable to be completed due to telephone visits        Phone call duration: 18 minutes

## 2021-01-06 NOTE — TELEPHONE ENCOUNTER
[facility] now requests orders and shares plan of care/discharge summaries for some patients through [employer].  Please REPLY TO THIS MESSAGE OR ROUTE BACK TO THE AUTHOR in order to give authorization for orders when needed.  This is considered a verbal order, you will still receive a faxed copy of orders for signature.  Thank you for your assistance in improving collaboration for our patients.    UPDATE per provider request:  Home care set up pt's meds for 14 days today. AFTER meds were set upfor pt, she has 41 tablets of tramadol 50mg left in the bottle.

## 2021-01-07 ENCOUNTER — TELEPHONE (OUTPATIENT)
Dept: ENDOCRINOLOGY | Facility: CLINIC | Age: 66
End: 2021-01-07

## 2021-01-07 DIAGNOSIS — Z53.9 DIAGNOSIS NOT YET DEFINED: Primary | ICD-10-CM

## 2021-01-07 PROCEDURE — G0179 MD RECERTIFICATION HHA PT: HCPCS | Performed by: INTERNAL MEDICINE

## 2021-01-07 NOTE — TELEPHONE ENCOUNTER
----- Message from Elizabeth Medrano MD sent at 1/7/2021  1:12 PM CST -----  Regarding: FW: Hypoglycemia    ----- Message -----  From: Elizabeth Medrano MD  Sent: 1/7/2021   1:10 PM CST  To: Tiffany King MD PhD, Mikayla Sellers, RN  Subject: RE: Hypoglycemia                                 Thank you very much, Tiffany!    Endo team: could you reach out her and get glucose number? I think we probably reduce Lantus 50%.     Elizabeth  ----- Message -----  From: Tiffany King MD PhD  Sent: 1/6/2021   2:09 PM CST  To: Elizabeth Medrano MD, Mikayla Sellers, RN  Subject: Hypoglycemia                                     Dr. Medrnao and Mikayla,  I had phone visit with Holly. She voiced concern about hypoglycemia, 3 times a week, in the 50s. I told her I will connect with your office to reach out to her to adjust her insulin. Thanks!    Tiffany

## 2021-01-07 NOTE — TELEPHONE ENCOUNTER
Date Pre-B Post-B Pre-L Post-L Pre-D Post-D  HS/NOC   1/3/21 64  11:03am    137  12:01pm     168  2:09pm      180  7:32pm,    186  7:37pm 140  10:41pm   1/4 125  6:24am    140  11:14am    120  1:07pm    73  2:20pm 85  3:43pm 177  6:06pm   234  10:27 pm 191  11:40pm     1/5 166  5:17am    141  10:11am    115  1:40pm 92  5:22pm  58  9:31pm    55  9:35pm 110  10:03pm   1/6 166  5:17am    143  10:41am    115  1:40pm   108  3:43pm 127  9:04pm 162  10:23pm   1/7 62  4:38am  *treated low    170 5:38am    177  11:00am  109  (2:30pm) 112 (3:37pm)                                Current Diabetes Regimen:    Metformin 1,000 mg, patient states TID (with meals)  Actos 45 mg daily  Lantus 40 units every night  Novolog with meals based on carbs, was taking 15 units per meal but now about 3-6 units per meal    Please note patient reported that she will be getting an epidural for her neck/back pain. Writer notified patient to be aware that if there is a steroid in the epidural her blood sugars will go higher and she should call clinic. Patient verbalizes understanding and agrees to plan.    Total length of call 25 mins.     Will send to Dr. Medrano to review.       Anahy Bloom RN  Endocrine Care Coordinator  [facility]

## 2021-01-08 NOTE — TELEPHONE ENCOUNTER
Per Dr. Medrano:    Could you call to the patient, Lantus 30 units instead of 55 units?     Elizabeth       Re-reviewed with Dr. Medrano that patient is taking Lantus 40 units daily.    Per Dr. Medrano:    Have patient then decrease to 20 units of Lantus.         Will send to Diabetes Educator, Mikayla Sellers.       Anahy Bloom, RN  Endocrine Care Coordinator  [facility]

## 2021-01-15 DIAGNOSIS — Z11.59 ENCOUNTER FOR SCREENING FOR OTHER VIRAL DISEASES: ICD-10-CM

## 2021-01-15 LAB
SARS-COV-2 RNA RESP QL NAA+PROBE: NORMAL
SPECIMEN SOURCE: NORMAL

## 2021-01-15 PROCEDURE — U0005 INFEC AGEN DETEC AMPLI PROBE: HCPCS | Performed by: FAMILY MEDICINE

## 2021-01-15 PROCEDURE — U0003 INFECTIOUS AGENT DETECTION BY NUCLEIC ACID (DNA OR RNA); SEVERE ACUTE RESPIRATORY SYNDROME CORONAVIRUS 2 (SARS-COV-2) (CORONAVIRUS DISEASE [COVID-19]), AMPLIFIED PROBE TECHNIQUE, MAKING USE OF HIGH THROUGHPUT TECHNOLOGIES AS DESCRIBED BY CMS-2020-01-R: HCPCS | Performed by: FAMILY MEDICINE

## 2021-01-16 LAB
LABORATORY COMMENT REPORT: NORMAL
SARS-COV-2 RNA RESP QL NAA+PROBE: NEGATIVE
SPECIMEN SOURCE: NORMAL

## 2021-01-19 ENCOUNTER — ANCILLARY PROCEDURE (OUTPATIENT)
Dept: GENERAL RADIOLOGY | Facility: CLINIC | Age: 66
End: 2021-01-19
Attending: FAMILY MEDICINE
Payer: MEDICARE

## 2021-01-19 DIAGNOSIS — M54.50 CHRONIC BILATERAL LOW BACK PAIN WITHOUT SCIATICA: ICD-10-CM

## 2021-01-19 DIAGNOSIS — G89.29 CHRONIC BILATERAL LOW BACK PAIN WITHOUT SCIATICA: ICD-10-CM

## 2021-01-19 PROCEDURE — 62323 NJX INTERLAMINAR LMBR/SAC: CPT | Performed by: RADIOLOGY

## 2021-01-19 RX ORDER — LIDOCAINE HYDROCHLORIDE 10 MG/ML
30 INJECTION, SOLUTION EPIDURAL; INFILTRATION; INTRACAUDAL; PERINEURAL ONCE
Status: COMPLETED | OUTPATIENT
Start: 2021-01-19 | End: 2021-01-19

## 2021-01-19 RX ORDER — BUPIVACAINE HYDROCHLORIDE 5 MG/ML
10 INJECTION, SOLUTION PERINEURAL ONCE
Status: COMPLETED | OUTPATIENT
Start: 2021-01-19 | End: 2021-01-19

## 2021-01-19 RX ORDER — IOPAMIDOL 408 MG/ML
10 INJECTION, SOLUTION INTRATHECAL ONCE
Status: COMPLETED | OUTPATIENT
Start: 2021-01-19 | End: 2021-01-19

## 2021-01-19 RX ORDER — METHYLPREDNISOLONE ACETATE 80 MG/ML
80 INJECTION, SUSPENSION INTRA-ARTICULAR; INTRALESIONAL; INTRAMUSCULAR; SOFT TISSUE ONCE
Status: COMPLETED | OUTPATIENT
Start: 2021-01-19 | End: 2021-01-19

## 2021-01-19 RX ADMIN — IOPAMIDOL 2 ML: 408 INJECTION, SOLUTION INTRATHECAL at 14:03

## 2021-01-19 RX ADMIN — METHYLPREDNISOLONE ACETATE 80 MG: 80 INJECTION, SUSPENSION INTRA-ARTICULAR; INTRALESIONAL; INTRAMUSCULAR; SOFT TISSUE at 14:04

## 2021-01-19 RX ADMIN — LIDOCAINE HYDROCHLORIDE 5 ML: 10 INJECTION, SOLUTION EPIDURAL; INFILTRATION; INTRACAUDAL; PERINEURAL at 14:03

## 2021-01-19 RX ADMIN — BUPIVACAINE HYDROCHLORIDE 50 MG: 5 INJECTION, SOLUTION PERINEURAL at 14:03

## 2021-01-21 DIAGNOSIS — M17.10 ARTHRITIS OF KNEE: ICD-10-CM

## 2021-01-21 DIAGNOSIS — M25.562 ARTHRALGIA OF BOTH KNEES: ICD-10-CM

## 2021-01-21 DIAGNOSIS — M25.561 ARTHRALGIA OF BOTH KNEES: ICD-10-CM

## 2021-01-21 DIAGNOSIS — S92.352A CLOSED FRACTURE OF FIFTH METATARSAL BONE OF LEFT FOOT, PHYSEAL INVOLVEMENT UNSPECIFIED, INITIAL ENCOUNTER: ICD-10-CM

## 2021-01-21 RX ORDER — TRAMADOL HYDROCHLORIDE 50 MG/1
50 TABLET ORAL 3 TIMES DAILY
Qty: 90 TABLET | Refills: 0 | Status: SHIPPED | OUTPATIENT
Start: 2021-01-21 | End: 2021-02-17

## 2021-01-21 NOTE — TELEPHONE ENCOUNTER
Clinic Action Needed:needs order for trammadol  Reason for Call:home care RN/ just set up her medications/ because of the increase in tramadol/ she only has 5 pills left after 5/04/2021/she has enough for 2 weeks/ needs order for the medication (Tramadole 50 mg TID) by next week so they can set up meds again.  EVELYN Sood  Patient Recommendations/Teaching:  Routed to: [place]

## 2021-01-22 RX ORDER — GABAPENTIN 600 MG/1
TABLET ORAL
Qty: 120 TABLET | Refills: 4 | Status: SHIPPED | OUTPATIENT
Start: 2021-01-22 | End: 2021-10-08

## 2021-01-22 NOTE — TELEPHONE ENCOUNTER
Routing refill request to provider for review/approval because:  Drug not on the [facility] refill protocol     Alejandra BENAVIDEZN, RN, CPN

## 2021-02-04 ENCOUNTER — TRANSFERRED RECORDS (OUTPATIENT)
Dept: HEALTH INFORMATION MANAGEMENT | Facility: CLINIC | Age: 66
End: 2021-02-04

## 2021-02-05 DIAGNOSIS — E11.9 TYPE 2 DIABETES MELLITUS (H): ICD-10-CM

## 2021-02-05 NOTE — TELEPHONE ENCOUNTER
Pt is calling.    Testing glucose 4 times a day. Needs test strips. Script for test strips says to check 3 times a day. Ran out of test strips. Needs refill. Please put on script to test 4 times a day.  Last office visit was 12/11/2020 with Endocrinology.    Rupali Gross RN  [facility] Nurse Advisor  2/5/2021 at 11:05 AM

## 2021-02-15 ENCOUNTER — ALLIED HEALTH/NURSE VISIT (OUTPATIENT)
Dept: EDUCATION SERVICES | Facility: CLINIC | Age: 66
End: 2021-02-15
Payer: MEDICARE

## 2021-02-15 DIAGNOSIS — Z79.4 TYPE 2 DIABETES MELLITUS WITH DIABETIC NEUROPATHY, WITH LONG-TERM CURRENT USE OF INSULIN (H): Primary | ICD-10-CM

## 2021-02-15 DIAGNOSIS — E11.40 TYPE 2 DIABETES MELLITUS WITH DIABETIC NEUROPATHY, WITH LONG-TERM CURRENT USE OF INSULIN (H): Primary | ICD-10-CM

## 2021-02-15 DIAGNOSIS — E11.9 TYPE 2 DIABETES MELLITUS (H): Primary | ICD-10-CM

## 2021-02-15 PROCEDURE — G0108 DIAB MANAGE TRN  PER INDIV: HCPCS

## 2021-02-15 RX ORDER — BLOOD SUGAR DIAGNOSTIC
STRIP MISCELLANEOUS
Qty: 200 STRIP | Refills: 6 | Status: SHIPPED | OUTPATIENT
Start: 2021-02-15 | End: 2021-04-12

## 2021-02-15 NOTE — PATIENT INSTRUCTIONS
1. Increase Basaglar from 65 to 70 units daily for seven days.     2. Mikayla will call you in one week to review blood sugars.       Mikayla Sellers, RN, BSN, CDE   [facility]

## 2021-02-15 NOTE — PROGRESS NOTES
"Diabetes Self Management Training: Individual Review Visit    Holly Muir presents today for evaluation of glucose control related to Type 2 diabetes.    She is accompanied by self    Patient's diabetes management related comments/concerns: Hyperglycemia.     Patient's emotional response to diabetes: expresses readiness to learn    Patient would like this visit to be focused around the following diabetes-related behaviors and goals: Taking Medication    ASSESSMENT:  Patient presents to clinic per recommendation of her Endo for bg and medication review. Diagnosed with Type 2 Diabetes in 2002. Lives with daughter. Home health nurse comes to home once a week.  Co-morbidities include: retinopathy.     Pt states she got a steroid injection two weeks ago for lower back pain. Verbalized first week post injeciton bg levels were in the \"400's. Last week they were in the 300's. This morning, fasting bg was 223. Unfortunately, pt did not think to call the clinic to ask for help with insulin dose adjustments.     Current Diabetes Management per Patient:  Taking diabetes medications? Yes: see below.      Diabetes Medication(s)     Biguanides       metFORMIN (GLUCOPHAGE) 1000 MG tablet    Take 1 tablet (1,000 mg) by mouth 2 times daily (with meals)    Insulin       insulin aspart (NOVOLOG FLEXPEN) 100 UNIT/ML pen    15 UNIT SUBCUTANEOUS PER MEAL THREE TIMES A DAY.     insulin glargine (BASAGLAR KWIKPEN) 100 UNIT/ML pen    TAKE 62 UNITS SUBCUTANEOUSLY DAILY.    Insulin Sensitizing Agents       pioglitazone (ACTOS) 45 MG tablet    Take 1 tablet (45 mg) by mouth daily          Do you have any difficulty affording your medications or glucose monitoring supplies?  No.     Patient glucose self monitoring as follows: States has increased from three to four times daily.   BG meter: Uses Accu-Chek [employer] meter. Forgot to bring meter to appt.     BG results: Pt states fasting bg this morning was 223. Bg checked during visit. Result: " "208: 2.5hrs post breakfast.       BG values are: Not in goal  Patient's most recent   Lab Results   Component Value Date    A1C 7.7 01/06/2021    is not meeting goal of < 6.5.    Nutrition:  Not discussed today.    Diabetes Risk Factors:  Age over 45 years  Ethnicity  Overweight  Inactivity     Diabetes Complications:  Not discussed today.    Recent health service and resource utilization related to diabetes (hyperglycemia, hypoglycemia, etc):   None    Vitals:  There were no vitals taken for this visit.  Estimated body mass index is 38.96 kg/m  as calculated from the following:    Height as of 12/14/20: 1.575 m (5' 2\").    Weight as of 12/14/20: 96.6 kg (213 lb).   Last 3 BP:   BP Readings from Last 3 Encounters:   12/11/20 112/76   10/06/20 128/81   09/14/20 126/80       History   Smoking Status     Former Smoker     Packs/day: 0.00     Quit date: 8/9/2016   Smokeless Tobacco     Never Used       Labs:  Lab Results   Component Value Date    A1C 7.7 01/06/2021     Lab Results   Component Value Date     08/24/2020     Lab Results   Component Value Date    LDL 26 06/18/2020     HDL Cholesterol   Date Value Ref Range Status   06/18/2020 78 >49 mg/dL Final   ]  GFR Estimate   Date Value Ref Range Status   08/24/2020 >90 >60 mL/min/[1.73_m2] Final     Comment:     Non  GFR Calc  Starting 12/18/2018, serum creatinine based estimated GFR (eGFR) will be   calculated using the Chronic Kidney Disease Epidemiology Collaboration   (CKD-EPI) equation.       GFR Estimate If Black   Date Value Ref Range Status   08/24/2020 >90 >60 mL/min/[1.73_m2] Final     Comment:      GFR Calc  Starting 12/18/2018, serum creatinine based estimated GFR (eGFR) will be   calculated using the Chronic Kidney Disease Epidemiology Collaboration   (CKD-EPI) equation.       Lab Results   Component Value Date    CR 0.68 08/24/2020       Socio/Economic/Cultural Considerations:    Support system: family    Cultural " "Influences/Ethnic Background:  American    Health Literacy/Numeracy:  \"With diabetes, it's helpful to use forms and log books to write down blood sugars and what you're eating at times to help understand how foods affect your blood sugars. With this in mind how confident are you at filling out medical forms, such as these, by yourself?  Not Assessed    Health Beliefs and Attitudes:   Patient Activation Measure Survey Score:  RADHA Score (Last Two) 9/15/2011 1/5/2012   RADHA Raw Score 30 35   Activation Score 37.3 45.2   RADHA Level 1 1       Barriers to Learning: Depression. Memory issues.       INTERVENTION:   Education provided today on:  Taking Medication: temporary insulin dose adjustment made due to steroid injection.     Opportunities for ongoing education and support in diabetes-self management were discussed.    Pt verbalized understanding of concepts discussed and recommendations provided today.       PLAN:  Taking Medication: Increase Basaglar from 65 to 70 units daily for seven days.       FOLLOW-UP:  Has return appt with Endo in June.     Ongoing plan for education and support:   Return to see Cde in 2 mos. Cde will call pt in one week for bg review.     Time Spent: 30 minutes  Encounter Type: Individual    Any diabetes medication dose changes were made via the CDE Protocol and Collaborative Practice Agreement with the patient's endocrinology provider. A copy of this encounter was shared with the provider.    Holly LORE Muir comes into clinic today at the request of Dr Medrano,  Ordering Provider for Pt Teaching and review of bg.     This service provided today was under the supervising provider of the day Dr العراقي, who was available if needed.    Mikayla Sellers RN, BSN, CDE   [facility]  "

## 2021-02-16 ENCOUNTER — TELEPHONE (OUTPATIENT)
Dept: FAMILY MEDICINE | Facility: CLINIC | Age: 66
End: 2021-02-16

## 2021-02-16 DIAGNOSIS — M25.561 ARTHRALGIA OF BOTH KNEES: ICD-10-CM

## 2021-02-16 DIAGNOSIS — M25.562 ARTHRALGIA OF BOTH KNEES: ICD-10-CM

## 2021-02-16 DIAGNOSIS — M17.10 ARTHRITIS OF KNEE: ICD-10-CM

## 2021-02-16 NOTE — TELEPHONE ENCOUNTER
Routing refill request to provider for review/approval because:  Drug not on the [facility] refill protocol     Viry BENAVIDEZN, RN

## 2021-02-16 NOTE — TELEPHONE ENCOUNTER
.Reason for call:  Other   Patient called regarding (reason for call): orders  Additional comments: skilled nursing for re certification and medication management. Call [phone]    Phone number to reach patient:  Home number on file [phone] (home)    Best Time:  anytime    Can we leave a detailed message on this number?  YES    Travel screening: Negative

## 2021-02-16 NOTE — TELEPHONE ENCOUNTER
Notified Jennifer wang for home care orders per [facility] protocol.     Romeo Roman RN, BSN, PHN

## 2021-02-17 RX ORDER — TRAMADOL HYDROCHLORIDE 50 MG/1
50 TABLET ORAL 3 TIMES DAILY
Qty: 90 TABLET | Refills: 0 | Status: SHIPPED | OUTPATIENT
Start: 2021-02-20 | End: 2021-03-31

## 2021-02-22 ENCOUNTER — ALLIED HEALTH/NURSE VISIT (OUTPATIENT)
Dept: EDUCATION SERVICES | Facility: CLINIC | Age: 66
End: 2021-02-22
Payer: MEDICARE

## 2021-02-22 ENCOUNTER — TELEPHONE (OUTPATIENT)
Dept: ENDOCRINOLOGY | Facility: CLINIC | Age: 66
End: 2021-02-22

## 2021-02-22 DIAGNOSIS — E11.40 TYPE 2 DIABETES MELLITUS WITH DIABETIC NEUROPATHY, WITH LONG-TERM CURRENT USE OF INSULIN (H): Primary | ICD-10-CM

## 2021-02-22 DIAGNOSIS — Z79.4 TYPE 2 DIABETES MELLITUS WITH DIABETIC NEUROPATHY, WITH LONG-TERM CURRENT USE OF INSULIN (H): Primary | ICD-10-CM

## 2021-02-22 PROCEDURE — 99207 PR DROP WITH A PROCEDURE: CPT

## 2021-02-22 NOTE — PROGRESS NOTES
Telephone Visit:    Cde called pt for bg report. Pt met with Cde on 02/15/21. Basaglar increased from 65 ot 70 units due to pt having steroid injection for lower back pain two weeks prior to visit. Bg levels were running above target.     Pt not at home right santana but states bg levels have improved. No issues with hypoglycemia. BG today: 145 and 121.     Pt advised to continue taking 70 units Basaglar and call Cde if she begins to have readings in the 70's, 80's or 90's. Pt verbalized.     Mikayla Sellers, RN, BSN, CDE   [facility]

## 2021-03-01 ENCOUNTER — TELEPHONE (OUTPATIENT)
Dept: FAMILY MEDICINE | Facility: CLINIC | Age: 66
End: 2021-03-01

## 2021-03-02 DIAGNOSIS — Z53.9 DIAGNOSIS NOT YET DEFINED: Primary | ICD-10-CM

## 2021-03-02 PROCEDURE — G0179 MD RECERTIFICATION HHA PT: HCPCS | Performed by: INTERNAL MEDICINE

## 2021-03-25 DIAGNOSIS — R25.1 TREMOR: ICD-10-CM

## 2021-03-25 RX ORDER — PROPRANOLOL HYDROCHLORIDE 80 MG/1
CAPSULE, EXTENDED RELEASE ORAL
Qty: 90 CAPSULE | Refills: 1 | Status: SHIPPED | OUTPATIENT
Start: 2021-03-25 | End: 2021-09-16

## 2021-03-31 ENCOUNTER — VIRTUAL VISIT (OUTPATIENT)
Dept: FAMILY MEDICINE | Facility: CLINIC | Age: 66
End: 2021-03-31
Payer: MEDICARE

## 2021-03-31 DIAGNOSIS — M17.10 ARTHRITIS OF KNEE: ICD-10-CM

## 2021-03-31 DIAGNOSIS — M25.561 ARTHRALGIA OF BOTH KNEES: ICD-10-CM

## 2021-03-31 DIAGNOSIS — M25.562 ARTHRALGIA OF BOTH KNEES: ICD-10-CM

## 2021-03-31 PROCEDURE — 96127 BRIEF EMOTIONAL/BEHAV ASSMT: CPT | Mod: 95 | Performed by: INTERNAL MEDICINE

## 2021-03-31 PROCEDURE — 99442 PR PHYSICIAN TELEPHONE EVALUATION 11-20 MIN: CPT | Mod: 95 | Performed by: INTERNAL MEDICINE

## 2021-03-31 RX ORDER — TRAMADOL HYDROCHLORIDE 50 MG/1
50 TABLET ORAL 3 TIMES DAILY
Qty: 90 TABLET | Refills: 0 | Status: SHIPPED | OUTPATIENT
Start: 2021-03-31 | End: 2021-05-10

## 2021-03-31 ASSESSMENT — ANXIETY QUESTIONNAIRES
5. BEING SO RESTLESS THAT IT IS HARD TO SIT STILL: SEVERAL DAYS
GAD7 TOTAL SCORE: 12
7. FEELING AFRAID AS IF SOMETHING AWFUL MIGHT HAPPEN: MORE THAN HALF THE DAYS
6. BECOMING EASILY ANNOYED OR IRRITABLE: MORE THAN HALF THE DAYS
3. WORRYING TOO MUCH ABOUT DIFFERENT THINGS: MORE THAN HALF THE DAYS
2. NOT BEING ABLE TO STOP OR CONTROL WORRYING: NEARLY EVERY DAY
IF YOU CHECKED OFF ANY PROBLEMS ON THIS QUESTIONNAIRE, HOW DIFFICULT HAVE THESE PROBLEMS MADE IT FOR YOU TO DO YOUR WORK, TAKE CARE OF THINGS AT HOME, OR GET ALONG WITH OTHER PEOPLE: SOMEWHAT DIFFICULT
1. FEELING NERVOUS, ANXIOUS, OR ON EDGE: SEVERAL DAYS

## 2021-03-31 ASSESSMENT — PATIENT HEALTH QUESTIONNAIRE - PHQ9
5. POOR APPETITE OR OVEREATING: SEVERAL DAYS
SUM OF ALL RESPONSES TO PHQ QUESTIONS 1-9: 13

## 2021-03-31 NOTE — PROGRESS NOTES
Holly is a 66 year old who is being evaluated via a billable telephone visit.      What phone number would you like to be contacted at? [phone]  How would you [facility]    Assessment & Plan     Holly was seen today for hypertension, diabetes, lipids, anxiety and depression.    Diagnoses and all orders for this visit:    Arthritis of knee  -     traMADol (ULTRAM) 50 MG tablet; Take 1 tablet (50 mg) by mouth 3 times daily    Arthralgia of both knees  -     traMADol (ULTRAM) 50 MG tablet; Take 1 tablet (50 mg) by mouth 3 times daily       The visit is primarily for chronic pain review and set up imedication agreement.  We did briefly review her history of diabetes and depression/anxiety.  Patient is following with specialist for these conditions.  We did not make medication adjustment.    Return in about 5 months (around 2021) for wellness visit.    Tiffany King MD PhD  [facility]   Holly is a 66 year old who presents for the following health issues     HPI   This visit was scheduled for chronic pain medication refill.  Patient has chronic pain from osteoarthritis of both knees.  She is maintained on a low-dose tramadol 50 mg 3 times a day.  Patient is controlled medication agreement has .  Patient reports this is working well for her at the current dose.    Diabetes Follow-up  Sees endocrinologist and diabetes educator. Has appointment in April.     How often are you checking your blood sugar? 3 times daily    What concerns do you have today about your diabetes? None     Do you have any of these symptoms? (Select all that apply)  Numbness in feet    Have you had a diabetic eye exam in the last 12 months? No      Hyperlipidemia Follow-Up      Are you regularly taking any medication or supplement to lower your cholesterol?   Yes- simvastatin    Are you having muscle aches or other side effects that you think could be caused by your cholesterol  lowering medication?  No    Hypertension Follow-up      Do you check your blood pressure regularly outside of the clinic? no    Are you following a low salt diet? Yes    Are your blood pressures ever more than 140 on the top number (systolic) OR more   than 90 on the bottom number (diastolic), for example 140/90? unknown    BP Readings from Last 2 Encounters:   20 112/76   10/06/20 128/81     Hemoglobin A1C (%)   Date Value   2021 7.7 (H)   2020 7.9 (A)     LDL Cholesterol Calculated (mg/dL)   Date Value   2020 26   2018 25       Depression and Anxiety Follow-Up  Follows with psychiatrist. Declined therapy. She didn't think therapy helped in the past. Psychiatrist didn't want to change medications at this time.     How are you doing with your depression since your last visit? No change    How are you doing with your anxiety since your last visit?  No change    Are you having other symptoms that might be associated with depression or anxiety? No    Have you had a significant life event? No     Do you have any concerns with your use of alcohol or other drugs? No    Social History     Tobacco Use     Smoking status: Former Smoker     Packs/day: 0.00     Quit date: 2016     Years since quittin.6     Smokeless tobacco: Never Used   Substance Use Topics     Alcohol use: Yes     Alcohol/week: 0.0 standard drinks     Comment: social occasions     Drug use: No     PHQ 2020 2020 3/31/2021   PHQ-9 Total Score - - 13   Q9: Thoughts of better off dead/self-harm past 2 weeks - - Not at all   PHQ-9 External Data 1 1 -     ISABELA-7 SCORE 2019 2019 3/31/2021   Total Score - - -   Total Score 9 0 12     Last PHQ-9 3/31/2021   1.  Little interest or pleasure in doing things 3   2.  Feeling down, depressed, or hopeless 1   3.  Trouble falling or staying asleep, or sleeping too much 3   4.  Feeling tired or having little energy 1   5.  Poor appetite or overeating 2   6.  Feeling bad  about yourself 0   7.  Trouble concentrating 0   8.  Moving slowly or restless 3   Q9: Thoughts of better off dead/self-harm past 2 weeks 0   PHQ-9 Total Score 13   Difficulty at work, home, or with people Somewhat difficult     ISABELA-7  3/31/2021   1. Feeling nervous, anxious, or on edge 1   2. Not being able to stop or control worrying 3   3. Worrying too much about different things 2   4. Trouble relaxing 1   5. Being so restless that it is hard to sit still 1   6. Becoming easily annoyed or irritable 2   7. Feeling afraid, as if something awful might happen 2   ISABELA-7 Total Score 12   If you checked any problems, how difficult have they made it for you to do your work, take care of things at home, or get along with other people? Somewhat difficult       Suicide Assessment Five-step Evaluation and Treatment (SAFE-T)      How many servings of fruits and vegetables do you eat daily?  2-3    On average, how many sweetened beverages do you drink each day (Examples: soda, juice, sweet tea, etc.  Do NOT count diet or artificially sweetened beverages)?   2    How many days per week do you exercise enough to make your heart beat faster? 3 or less    How many minutes a day do you exercise enough to make your heart beat faster? 9 or less    How many days per week do you miss taking your medication?         Review of Systems   Constitutional, HEENT, cardiovascular, pulmonary, gi and gu systems are negative, except as otherwise noted.      Objective           Vitals:  No vitals were obtained today due to virtual visit.    Physical Exam   healthy, alert and no distress  PSYCH: Alert and oriented times 3; coherent speech, normal   rate and volume, able to articulate logical thoughts, able   to abstract reason, no tangential thoughts, no hallucinations   or delusions  Her affect is normal  RESP: No cough, no audible wheezing, able to talk in full sentences  Remainder of exam unable to be completed due to telephone  visits          Phone call duration: 14 minutes

## 2021-03-31 NOTE — LETTER
Opioid / Opioid Plus Controlled Substance Agreement    This is an agreement between you and your provider about the safe and appropriate use of controlled substance/opioids prescribed by your care team. Controlled substances are medicines that can cause physical and mental dependence (abuse).    There are strict laws about having and using these medicines. We here at [facility] are committing to working with you in your efforts to get better. To support you in this work, we ll help you schedule regular office appointments for medicine refills. If we must cancel or change your appointment for any reason, we ll make sure you have enough medicine to last until your next appointment.     As a Provider, I will:    Listen carefully to your concerns and treat you with respect.     Recommend a treatment plan that I believe is in your best interest. This plan may involve therapies other than opioid pain medication.     Talk with you often about the possible benefits, and the risk of harm of any medicine that we prescribe for you.     Provide a plan on how to taper (discontinue or go off) using this medicine if the decision is made to stop its use.    As a Patient, I understand that opioid(s):     Are a controlled substance prescribed by my care team to help me function or work and manage my condition(s).     Are strong medicines and can cause serious side effects such as:    Drowsiness, which can seriously affect my driving ability    A lower breathing rate, enough to cause death    Harm to my thinking ability     Depression     Abuse of and addiction to this medicine    Need to be taken exactly as prescribed. Combining opioids with certain medicines or chemicals (such as illegal drugs, sedatives, sleeping pills, and benzodiazepines) can be dangerous or even fatal. If I stop opioids suddenly, I may have severe withdrawal symptoms.    Do not work for all types of pain nor for all patients. If they re not helpful, I may  be asked to stop them.        The risks, benefits and side effects of these medicine(s) were explained to me. I agree that:  1. I will take part in other treatments as advised by my care team. This may be psychiatry or counseling, physical therapy, behavioral therapy, group treatment or a referral to a specialist.     2. I will keep all my appointments. I understand that this is part of the monitoring of opioids. My care team may require an office visit for EVERY opioid/controlled substance refill. If I miss appointments or don t follow instructions, my care team may stop my medicine.    3. I will take my medicines as prescribed. I will not change the dose or schedule unless my care team tells me to. There will be no refills if I run out early.     4. I may be asked to come to the clinic and complete a urine drug test or complete a pill count at any time. If I don t give a urine sample or participate in a pill count, the care team may stop my medicine.    5. I will only receive prescriptions from this clinic for chronic pain. If I am treated by another provider for acute pain issues, I will tell them that I am taking opioid pain medication for chronic pain and that I have a treatment agreement with this provider. I will inform my [facility] care team within one business day if I am given a prescription for any pain medication by another healthcare provider. My [facility] care team can contact other providers and pharmacists about my use of any medicines.    6. It is up to me to make sure that I don t run out of my medicines on weekends or holidays. If my care team is willing to refill my opioid prescription without a visit, I must request refills only during office hours. Refills may take up to 3 business days to process. I will use one pharmacy to fill all my opioid and other controlled substance prescriptions. I will notify the clinic about any changes to my insurance or medication  availability.    7. I am responsible for my prescriptions. If the medicine/prescription is lost, stolen or destroyed, it will not be replaced. I also agree not to share controlled substance medicines with anyone.    8. I am aware I should not use any illegal or recreational drugs. I agree not to drink alcohol unless my care team says I can.       9. If I enroll in the [place] Medical Cannabis program, I will tell my care team prior to my next refill.     10. I will tell my care team right away if I become pregnant, have a new medical problem treated outside of my regular clinic, or have a change in my medications.    11. I understand that this medicine can affect my thinking, judgment and reaction time. Alcohol and drugs affect the brain and body, which can affect the safety of my driving. Being under the influence of alcohol or drugs can affect my decision-making, behaviors, personal safety, and the safety of others. Driving while impaired (DWI) can occur if a person is driving, operating, or in physical control of a car, motorcycle, boat, snowmobile, ATV, motorbike, off-road vehicle, or any other motor vehicle ([place] Statute 169A.20). I understand the risk if I choose to drive or operate any vehicle or machinery.    I understand that if I do not follow any of the conditions above, my prescriptions or treatment may be stopped or changed.          Opioids  What You Need to Know    What are opioids?   Opioids are pain medicines that must be prescribed by a doctor. They are also known as narcotics.     Examples are:   1. morphine (MS Contin, Maribell)  2. oxycodone (Oxycontin)  3. oxycodone and acetaminophen (Percocet)  4. hydrocodone and acetaminophen (Vicodin, Norco)   5. fentanyl patch (Duragesic)   6. hydromorphone (Dilaudid)   7. methadone  8. codeine (Tylenol #3)     What do opioids do well?   Opioids are best for severe short-term pain such as after a surgery or injury. They may work well for cancer pain. They may  help some people with long-lasting (chronic) pain.     What do opioids NOT do well?   Opioids never get rid of pain entirely, and they don t work well for most patients with chronic pain. Opioids don t reduce swelling, one of the causes of pain.                                    Other ways to manage chronic pain and improve function include:       Treat the health problem that may be causing pain    Anti-inflammation medicines, which reduce swelling and tenderness, such as ibuprofen (Advil, Motrin) or naproxen (Aleve)    Acetaminophen (Tylenol)    Antidepressants and anti-seizure medicines, especially for nerve pain    Topical treatments such as patches or creams    Injections or nerve blocks    Chiropractic or osteopathic treatment    Acupuncture, massage, deep breathing, meditation, visual imagery, aromatherapy    Use heat or ice at the pain site    Physical therapy     Exercise    Stop smoking    Take part in therapy       Risks and side effects     Talk to your doctor before you start or decide to keep taking opioids. Possible side effects include:      Lowering your breathing rate enough to cause death    Overdose, including death, especially if taking higher than prescribed doses    Worse depression symptoms; less pleasure in things you usually enjoy    Feeling tired or sluggish    Slower thoughts or cloudy thinking    Being more sensitive to pain over time; pain is harder to control    Trouble sleeping or restless sleep    Changes in hormone levels (for example, less testosterone)    Changes in sex drive or ability to have sex    Constipation    Unsafe driving    Itching and sweating    Dizziness    Nausea, throwing up and dry mouth    What else should I know about opioids?    Opioids may lead to dependence, tolerance, or addiction.      Dependence means that if you stop or reduce the medicine too quickly, you will have withdrawal symptoms. These include loose poop (diarrhea), jitters, flu-like symptoms,  nervousness and tremors. Dependence is not the same as addiction.                       Tolerance means needing higher doses over time to get the same effect. This may increase the chance of serious side effects.      Addiction is when people improperly use a substance that harms their body, their mind or their relations with others. Use of opiates can cause a relapse of addiction if you have a history of drug or alcohol abuse.      People who have used opioids for a long time may have a lower quality of life, worse depression, higher levels of pain and more visits to doctors.    You can overdose on opioids. Take these steps to lower your risk of overdose:    1. Recognize the signs:  Signs of overdose include decrease or loss of consciousness (blackout), slowed breathing, trouble waking up and blue lips. If someone is worried about overdose, they should call 911.    2. Talk to your doctor about Narcan (naloxone).   If you are at risk for overdose, you may be given a prescription for Narcan. This medicine very quickly reverses the effects of opioids.   If you overdose, a friend or family member can give you Narcan while waiting for the ambulance. They need to know the signs of overdose and how to give Narcan.     3. Don't use alcohol or street drugs.   Taking them with opioids can cause death.    4. Do not take any of these medicines unless your doctor says it s OK. Taking these with opioids can cause death:    Benzodiazepines, such as lorazepam (Ativan), alprazolam (Xanax) or diazepam (Valium)    Muscle relaxers, such as cyclobenzaprine (Flexeril)    Sleeping pills like zolpidem (Ambien)     Other opioids      How to keep you and other people safe while taking opioids:    1. Never share your opioids with others.  Opioid medicines are regulated by the Drug Enforcement Agency (DOUG). Selling or sharing medications is a criminal act.    2. Be sure to store opioids in a secure place, locked up if possible. Young children  can easily swallow them and overdose.    3. When you are traveling with your medicines, keep them in the original bottles. If you use a pill box, be sure you also carry a copy of your medicine list from your clinic or pharmacy.    4. Safe disposal of opioids    Most pharmacies have places to get rid of medicine, called disposal kiosks. Medicine disposal options are also available in every [facility]. Search your county and  medication disposal  to find more options. You can find more details at:  [email or web address]     I agree that my provider, clinic care team, and pharmacy may work with any city, state or federal law enforcement agency that investigates the misuse, sale, or other diversion of my controlled medicine. I will allow my provider to discuss my care with, or share a copy of, this agreement with any other treating provider, pharmacy or emergency room where I receive care.    I have read this agreement and have asked questions about anything I did not understand.    _______________________________________________________  Patient Signature - Holly Muir _____________________                   Date     _______________________________________________________  Provider Signature - Tiffany King MD PhD   _____________________                   Date     _______________________________________________________  Witness Signature (required if provider not present while patient signing)   _____________________                   Date

## 2021-04-01 ASSESSMENT — ANXIETY QUESTIONNAIRES: GAD7 TOTAL SCORE: 12

## 2021-04-02 ENCOUNTER — IMMUNIZATION (OUTPATIENT)
Dept: FAMILY MEDICINE | Facility: CLINIC | Age: 66
End: 2021-04-02
Payer: MEDICARE

## 2021-04-02 PROCEDURE — 91301 PR COVID VAC MODERNA 100 MCG/0.5 ML IM: CPT

## 2021-04-02 PROCEDURE — 0011A PR COVID VAC MODERNA 100 MCG/0.5 ML IM: CPT

## 2021-04-05 ENCOUNTER — TRANSFERRED RECORDS (OUTPATIENT)
Dept: HEALTH INFORMATION MANAGEMENT | Facility: CLINIC | Age: 66
End: 2021-04-05

## 2021-04-07 ENCOUNTER — PATIENT OUTREACH (OUTPATIENT)
Dept: GERIATRIC MEDICINE | Facility: CLINIC | Age: 66
End: 2021-04-07

## 2021-04-07 NOTE — LETTER
April 7, 2021    HOLLY LORE FELDMAN  [address] [facility] [facility] [address] [facility] [address]        Dear  Holly    Welcome to [facility] s [place] [facility] Plus ([facility] health program. My name is Divya Smalls RN. I am your MSC+ care coordinator.     I will call you soon to see how you are doing and determine what needs you may have. My job is to help connect you to services, complete an assessment, and develop a care plan with you. There is no charge to you for the care coordination and assessment services. Our goal is to keep you as healthy and independent as possible.     [facility] includes the benefits you may receive from Medical Assistance.    Soon, you will receive a new [facility] member identification (ID) card from [facility]. When you receive it, please use this card along with your [place] Health Care Programs card and Prescription Drug Coverage Program card. When you receive, it please use this card where you get your health services. If you have Medicare, you will need to show your Medicare card when you get health services.    If you have questions, please call me at [phone]. If you reach my voice mail, leave a message and your phone number. If you are hearing impaired, please call the [place] Relay at 824 or [phone] (kehjwf-jf-csckts relay service).    Sincerely,      Divya Smalls RN    E-mail: [email or web address]  Phone: [phone]      [facility] [id number] DHS Approved ([id number])  [id number] (11/18)

## 2021-04-08 NOTE — PROGRESS NOTES
[facility] Care Coordination Contact    Member became effective with  [facility] on 4/1/21 with [facility] MSC+.  Previous Health Plan: [facility]  Previous Care System: [facility]  Previous care coordinators name and number: MASSIMO Becker Type: CADI  Last MMIS Entry: Date 12/30/20 and Type DOC CHG    UTF received: No: Requested on 04/08/2021 - transfered from [facility] [phone].    Serenity Huntley  Care Management Specialist  [facility]  [phone]

## 2021-04-12 ENCOUNTER — ALLIED HEALTH/NURSE VISIT (OUTPATIENT)
Dept: EDUCATION SERVICES | Facility: CLINIC | Age: 66
End: 2021-04-12
Payer: MEDICARE

## 2021-04-12 DIAGNOSIS — Z79.4 TYPE 2 DIABETES MELLITUS WITH DIABETIC NEUROPATHY, WITH LONG-TERM CURRENT USE OF INSULIN (H): Primary | ICD-10-CM

## 2021-04-12 DIAGNOSIS — E11.40 TYPE 2 DIABETES MELLITUS WITH DIABETIC NEUROPATHY, WITH LONG-TERM CURRENT USE OF INSULIN (H): Primary | ICD-10-CM

## 2021-04-12 DIAGNOSIS — E11.9 TYPE 2 DIABETES MELLITUS (H): ICD-10-CM

## 2021-04-12 PROCEDURE — G0108 DIAB MANAGE TRN  PER INDIV: HCPCS

## 2021-04-12 RX ORDER — LANCETS
EACH MISCELLANEOUS
Qty: 102 EACH | Refills: 4 | Status: SHIPPED | OUTPATIENT
Start: 2021-04-12 | End: 2022-06-24

## 2021-04-12 NOTE — PATIENT INSTRUCTIONS
1. Okay to continue taking 54 units daily of Basaglar.    2. Okay to continue taking 6 units Novolog for a smaller meal and 12 units for a larger meal.      3. If pre-meal blood sugar is below 80, reduce meal dose Novolog by half (3 units for a smaller meal, 6 units for a larger meal).    4. If bedtime blood sugar is below 150, reduce Basaglar dose to 45 units.     Mikayla Sellers, RN, BSN, CDE   [facility]

## 2021-04-13 NOTE — PROGRESS NOTES
4/13/2021 Tc to member to introduce myself. Member agreed to complete a home and it is scheduled for 4/15/2021. Divya Smalls RN,BSN  [facility] Case Coordinator   [phone]

## 2021-04-15 ENCOUNTER — PATIENT OUTREACH (OUTPATIENT)
Dept: GERIATRIC MEDICINE | Facility: CLINIC | Age: 66
End: 2021-04-15

## 2021-04-15 ASSESSMENT — ACTIVITIES OF DAILY LIVING (ADL)
DEPENDENT_IADLS:: CLEANING;COOKING;LAUNDRY;SHOPPING;MEAL PREPARATION;MEDICATION MANAGEMENT;MONEY MANAGEMENT;TRANSPORTATION;INCONTINENCE

## 2021-04-16 ENCOUNTER — TELEPHONE (OUTPATIENT)
Dept: FAMILY MEDICINE | Facility: CLINIC | Age: 66
End: 2021-04-16

## 2021-04-16 NOTE — PROGRESS NOTES
Diabetes Self Management Training: Follow-up Visit    Holly Muir presents today for evaluation of glucose control Type 2 diabetes. Diagnosed, .     She is accompanied by self    Patient's diabetes management related comments/concerns: Episodes pf hypoglycemia.     Patient would like this visit to be focused around the following diabetes-related behaviors and goals: Taking Medication.     ASSESSMENT:  Patient presents to clinic to meet with Cde for bg and medication review.  had an episode of hypoglycemia last Friday evening which caused her daughter to call an ambulance. Pt was not taken to the ER but was helped by paramedics at home.  bg went down to 54. Pt did not become unconscious but was not able to self treat.     Cde asked pt if she knew why bg dropped.  thinks it's because she may have taken too much insulin with dinner. States daughter has been serving her smaller portion sizes. Decided to decrease meal dose starting on Sat: 12u for a larger meal, 6u for a smaller meal. No issues with hypoglycemia since then. Also decreased Basaglar dose from 70 to 54 units daily.     Shares her time living with two daughters: one month at a time at each house.     Current Diabetes Management per Patient:  Taking diabetes medications?  Yes: Novolou for a larger meal and 6u for a smaller meal. Decreased Basaglar from 70 to 54 units daily.   Diabetes Medication(s)     Biguanides       metFORMIN (GLUCOPHAGE) 1000 MG tablet    Take 1 tablet (1,000 mg) by mouth 2 times daily (with meals)    Insulin       insulin aspart (NOVOLOG FLEXPEN) 100 UNIT/ML pen    15 UNIT SUBCUTANEOUS PER MEAL THREE TIMES A DAY.     insulin glargine (BASAGLAR KWIKPEN) 100 UNIT/ML pen    TAKE 62 UNITS SUBCUTANEOUSLY DAILY.    Insulin Sensitizing Agents       pioglitazone (ACTOS) 45 MG tablet    Take 1 tablet (45 mg) by mouth daily          Do you have any difficulty affording your medications or glucose monitoring supplies?  "No.     Patient glucose self monitoring as follows: 3-4 times a day.    BG Results: 04/10 - 04/12  04/10: 5pm: 105, 8p: 88,9p: 105, 11p: 138  04/11: 2p: 126, 4p: 279, 8p: 185, 10p: 148  04/12: 1am: 69, 2am: 137, 11am: 164     BG values are: Not in goal  Patient's most recent   Lab Results   Component Value Date    A1C 7.7 01/06/2021    is not meeting goal of <7.0    Nutrition:  Pt states daughter is serving her smaller portions.     Physical Activity:    No regular exercise program.     Diabetes Complications:  Not discussed today.    Recent health service and resource utilization related to diabetes (hyperglycemia, hypoglycemia, etc.):  None    Vitals:  There were no vitals taken for this visit.  Estimated body mass index is 38.96 kg/m  as calculated from the following:    Height as of 12/14/20: 1.575 m (5' 2\").    Weight as of 12/14/20: 96.6 kg (213 lb).   Last 3 BP:   BP Readings from Last 3 Encounters:   12/11/20 112/76   10/06/20 128/81   09/14/20 126/80       History   Smoking Status     Former Smoker     Packs/day: 0.00     Quit date: 8/9/2016   Smokeless Tobacco     Never Used       Labs:  Lab Results   Component Value Date    A1C 7.7 01/06/2021     Lab Results   Component Value Date     08/24/2020     Lab Results   Component Value Date    LDL 26 06/18/2020     HDL Cholesterol   Date Value Ref Range Status   06/18/2020 78 >49 mg/dL Final   ]  GFR Estimate   Date Value Ref Range Status   08/24/2020 >90 >60 mL/min/[1.73_m2] Final     Comment:     Non  GFR Calc  Starting 12/18/2018, serum creatinine based estimated GFR (eGFR) will be   calculated using the Chronic Kidney Disease Epidemiology Collaboration   (CKD-EPI) equation.       GFR Estimate If Black   Date Value Ref Range Status   08/24/2020 >90 >60 mL/min/[1.73_m2] Final     Comment:      GFR Calc  Starting 12/18/2018, serum creatinine based estimated GFR (eGFR) will be   calculated using the Chronic Kidney Disease " "Epidemiology Collaboration   (CKD-EPI) equation.       Lab Results   Component Value Date    CR 0.68 08/24/2020       Health Beliefs and Attitudes:   Patient Activation Measure Survey Score:  RADHA Score (Last Two) 9/15/2011 1/5/2012   RADHA Raw Score 30 35   Activation Score 37.3 45.2   RADHA Level 1 1       Barriers to Learning: Depression, Memory issues, Inability to sleep soundly.     INTERVENTION:    Education provided today on:    Taking Medication: Discussed insulin doses.     Opportunities for ongoing education and support in diabetes-self management were discussed.    Pt verbalized understanding of concepts discussed and recommendations provided today.       PLAN:  Taking Medication:   Basaglar: Continue to take 54u daily. Pt verbalizes she is very afraid of \"lows\". Asks if she should not take Basaglar if bg is low at bedtime. Becomes nervous if bedtime bg < 150. Advised to not omit Basaglar but can reduce dose to 45 units if bedtime bg < 150.   Novolog: Continue to take 12u for a large meal and 6u for a small meal.     Pt requests refill for test strips. Done.     FOLLOW-UP:  Has f/u appt with Endo in June    Ongoing plan for education and support: Return to see Cde in 4 weeks for bg review.     Time Spent: 40 minutes  Encounter Type: Individual    Any diabetes medication dose changes were made via the CDE Protocol and Collaborative Practice Agreement with the patient's endocrinology provider. A copy of this encounter was shared with the provider.    Holly Muir comes into clinic today at the request of Dr Medrano, Ordering Provider for Pt Teaching and bg review.     This service provided today was under the supervising provider of the day Dr العراقي,  who was available if needed.    Mikayla Sellers, RN, BSN, CDE   [facility]  "

## 2021-04-19 ENCOUNTER — TELEPHONE (OUTPATIENT)
Dept: FAMILY MEDICINE | Facility: CLINIC | Age: 66
End: 2021-04-19

## 2021-04-19 DIAGNOSIS — M25.561 ARTHRALGIA OF BOTH KNEES: ICD-10-CM

## 2021-04-19 DIAGNOSIS — M25.562 ARTHRALGIA OF BOTH KNEES: ICD-10-CM

## 2021-04-19 NOTE — TELEPHONE ENCOUNTER
[facility] utilizes an encounter to take the place of a direct phone call to your office. Please take a moment to review the below request. Please reply or route message to author of this encounter.  Message will act as a verbal OK of orders requested below. Thank you.    ORDER  Requesting recertification orders for 30 days of services.  Requesting 1EOW5 for med mgmt, ostomy mgmt and trouble shooting with assistance of WOCN, DM eval, fall prevention ed.  3 PRN for change in condition, injury r/t fall, medication change requiring education/adjustment, WOCN consult visits.    Order for ostomy:  Client is independent with ostomy cares and will request assistance for complications and troubleshooting as needed.      MD SUMMARY/PLAN OF CARE    DISCHARGE SUMMARY

## 2021-04-19 NOTE — TELEPHONE ENCOUNTER
[facility] utilizes an encounter to take the place of a direct phone call to your office. Please take a moment to review the below request. Please reply or route message to author of this encounter.  Thank you.    PENDING DC  Under home [facility]'s new platform with [facility], we do not have a behavior health nurse on staff to be able to manager this patient's mental health disease process. Within 30 days home care will terminate services. We will work with the patient to find an alternative provider. The patient has been notified of the above information. Please reach out to me if you have questions or concerns.    Dora Mansfield RN  Director of Patient Care Services  [phone]  [email or web address]

## 2021-04-20 RX ORDER — ACETAMINOPHEN 500 MG/1
TABLET ORAL
Qty: 180 TABLET | Refills: 1 | Status: SHIPPED | OUTPATIENT
Start: 2021-04-20 | End: 2021-12-01

## 2021-04-21 NOTE — PROGRESS NOTES
[facility] Care Coordination Contact  [facility] CCDB Assessment   (for members on other waivers)    Home visit for Health Risk Assessment with Holly LORE Muir completed on April 15, 2021    Type of residence:: Private home - stairs  Current living arrangement:: I live in a private home with family          Current Care Plan  Member is a recipient of the CADI Waiver program.  Member currently receiving the following home care services:   Nursing for medication set up  Member currently receiving the following community resources: County Programs, DME, PCA       Medication Review  Medication reconciliation completed in Epic: No and If no, please explain Member does not manage. Member does have 2 dose machines  Medication set-up & administration: RN set up weekly.  Self-administers medications.  Medication Risk Assessment Medication (1 or more, place referral to MTM): Taking 1 or more high-risk medications for adults >65 years  MTM Referral Placed: No: Not at this time    Mental/Behavioral Health   Depression Screening: Not completed         Mental health DX:: Yes   Mental health DX how managed:: Medication    Falls Assessment:   Fallen 2 or more times in the past year?: No   Any fall with injury in the past year?: No    ADL/IADL Dependencies:   Dependent ADLs:: Ambulation-walker, Bathing, Dressing, Eating, Grooming, Incontinence, Positioning, Transfers, Toileting  Dependent IADLs:: Cleaning, Cooking, Laundry, Shopping, Meal Preparation, Medication Management, Money Management, Transportation, Incontinence    [facility] sponsored benefits: Shared information re: Silver Sneakers/gym memberships, ASA, Calcium +D.    PCA Assessment completed at visit: Not Applicable      Care Plan & Recommendations: Member given dental clinic info.     CADI Care Plan/ISP requested from waiver .     Follow-Up Plan: Member informed of future contact, plan to f/u with member with a 6 month telephone  assessment.  Contact information shared with member and family, encouraged member to call with any questions or concerns at any time.    [place] care continuum providers: Please refer to Health Care Home on the Epic Problem List to view this patient's [facility] Care Plan Summary.    Divya Smalls RN,BSN  [facility] Case Coordinator   [phone]

## 2021-04-23 ENCOUNTER — PATIENT OUTREACH (OUTPATIENT)
Dept: GERIATRIC MEDICINE | Facility: CLINIC | Age: 66
End: 2021-04-23

## 2021-04-23 NOTE — LETTER
April 23, 2021  HOLLY FELDMAN  [address] [address] [id number]          Dear Holly:    At [facility], we are dedicated to improving your health and well-being. Enclosed is the Comprehensive Care Plan that we developed with you on April 15, 2021. Please review the Care Plan carefully.    As a reminder, some of the things we discussed at your visit include:    Your physical and mental health    Ways to reduce falls    Health care needs you may have    Don t forget to contact your care coordinator if you:    Have been hospitalized or plan to be hospitalized     Have had a fall     Have experienced a change in physical health    Are experiencing emotional problems     If you do not agree with your Care Plan, have questions about it, or have experienced a change in your needs, please call me at [phone]. If you are hearing impaired, please call the [place] Relay at 629 or [phone] ([id number] relay service).    Sincerely,        Dviya Smalls RN    E-mail: [email or web address]  Phone: [phone]      [id number] [place] [id number]     [id number] (11/18)

## 2021-04-23 NOTE — PROGRESS NOTES
[facility] Care Coordination Contact    Received after visit chart from care coordinator.  Completed following tasks: Mailed copy of care plan to client and Updated services in access   and Provider Signature - No POC Shared:  Member indicates that they do not want their POC shared with any [facility] providers.     Assessment done via phone.  Mailed POC signature page to member requesting they sign and return and provided a stamped return envelope.    Serenity Huntley  Care Management Specialist  [facility]  [phone]

## 2021-04-26 ENCOUNTER — TELEPHONE (OUTPATIENT)
Dept: FAMILY MEDICINE | Facility: CLINIC | Age: 66
End: 2021-04-26

## 2021-04-29 DIAGNOSIS — Z53.9 DIAGNOSIS NOT YET DEFINED: Primary | ICD-10-CM

## 2021-04-29 PROCEDURE — G0179 MD RECERTIFICATION HHA PT: HCPCS | Performed by: INTERNAL MEDICINE

## 2021-04-30 ENCOUNTER — IMMUNIZATION (OUTPATIENT)
Dept: FAMILY MEDICINE | Facility: CLINIC | Age: 66
End: 2021-04-30
Attending: FAMILY MEDICINE
Payer: MEDICARE

## 2021-04-30 PROCEDURE — 91301 PR COVID VAC MODERNA 100 MCG/0.5 ML IM: CPT

## 2021-04-30 PROCEDURE — 0012A PR COVID VAC MODERNA 100 MCG/0.5 ML IM: CPT

## 2021-05-07 DIAGNOSIS — M17.10 ARTHRITIS OF KNEE: ICD-10-CM

## 2021-05-07 DIAGNOSIS — M25.561 ARTHRALGIA OF BOTH KNEES: ICD-10-CM

## 2021-05-07 DIAGNOSIS — M25.562 ARTHRALGIA OF BOTH KNEES: ICD-10-CM

## 2021-05-10 ENCOUNTER — ALLIED HEALTH/NURSE VISIT (OUTPATIENT)
Dept: EDUCATION SERVICES | Facility: CLINIC | Age: 66
End: 2021-05-10
Payer: MEDICARE

## 2021-05-10 DIAGNOSIS — E11.40 TYPE 2 DIABETES MELLITUS WITH DIABETIC NEUROPATHY, WITH LONG-TERM CURRENT USE OF INSULIN (H): Primary | ICD-10-CM

## 2021-05-10 DIAGNOSIS — Z79.4 TYPE 2 DIABETES MELLITUS WITH DIABETIC NEUROPATHY, WITH LONG-TERM CURRENT USE OF INSULIN (H): Primary | ICD-10-CM

## 2021-05-10 DIAGNOSIS — E11.9 TYPE 2 DIABETES MELLITUS (H): ICD-10-CM

## 2021-05-10 DIAGNOSIS — E11.9 DIABETES MELLITUS (H): ICD-10-CM

## 2021-05-10 PROCEDURE — G0108 DIAB MANAGE TRN  PER INDIV: HCPCS

## 2021-05-10 RX ORDER — INSULIN GLARGINE 100 [IU]/ML
INJECTION, SOLUTION SUBCUTANEOUS
Qty: 30 ML | Refills: 3 | Status: SHIPPED | OUTPATIENT
Start: 2021-05-10 | End: 2022-03-22

## 2021-05-10 RX ORDER — TRAMADOL HYDROCHLORIDE 50 MG/1
50 TABLET ORAL 3 TIMES DAILY
Qty: 90 TABLET | Refills: 0 | Status: SHIPPED | OUTPATIENT
Start: 2021-05-10 | End: 2021-07-06

## 2021-05-10 RX ORDER — INSULIN ASPART 100 [IU]/ML
INJECTION, SOLUTION INTRAVENOUS; SUBCUTANEOUS
Qty: 30 ML | Refills: 3 | Status: SHIPPED | OUTPATIENT
Start: 2021-05-10 | End: 2021-10-08

## 2021-05-10 NOTE — PROGRESS NOTES
Diabetes Self Management Training: Follow-up Visit    Holly Muir presents today for evaluation of glucose control, related to, Type 2 diabetes.    She is accompanied by self    Patient's diabetes management related comments/concerns: None at this time.     Patient would like this visit to be focused around the following diabetes-related behaviors and goals: N/a.    ASSESSMENT:  Patient present to clinic for bg/medication follow-up. Pt last seen by Cde 21. Please refer to visit note for plan of care information.    Current Diabetes Management per Patient:     Diabetes Medication(s)     Biguanides       metFORMIN (GLUCOPHAGE) 1000 MG tablet    Take 1 tablet (1,000 mg) by mouth 2 times daily (with meals)    Insulin       insulin aspart (NOVOLOG FLEXPEN) 100 UNIT/ML pen    12 UNITS SUBCUTANEOUS PER MEAL, THREE TIMES A DAY.     insulin glargine (BASAGLAR KWIKPEN) 100 UNIT/ML pen    TAKE 56 UNITS SUBCUTANEOUSLY DAILY.    Insulin Sensitizing Agents       pioglitazone (ACTOS) 45 MG tablet    Take 1 tablet (45 mg) by mouth daily        Current Insulin Doses:   Basaglar: 54 units daily.    Novolo units for large meal, 6u for small meal.   Novolog: If pre-meal bg < 80: 6 units for large meal, 3 units for small meal.      Do you have any difficulty affording your medications or glucose monitoring supplies? No.    Patient glucose self monitoring as follows: 3-4x daily.    BG meter: Accu-Chek Expert.     BG RESULTS: Per 2-Week Meter Download Report:   Ave B.   Highest Readin. Only 1 reading > 300  Lowest readin. Only 1 reading < 70.     BG values are: Not in goal  Patient's most recent   Lab Results   Component Value Date    A1C 7.7 2021    is not meeting goal of <7.0    Nutrition:  Patient states her daughters continue to monitor her food intake. Serve pt smaller portion sizes.     Physical Activity:    Limitations: Neuropathy. Pt walks with a cane.     Diabetes Complications:  Not  "discussed today.    Recent health service and resource utilization related to diabetes (hyperglycemia, hypoglycemia, etc.):  None    Vitals:  There were no vitals taken for this visit.  Estimated body mass index is 38.96 kg/m  as calculated from the following:    Height as of 12/14/20: 1.575 m (5' 2\").    Weight as of 12/14/20: 96.6 kg (213 lb).   Last 3 BP:   BP Readings from Last 3 Encounters:   12/11/20 112/76   10/06/20 128/81   09/14/20 126/80       History   Smoking Status     Former Smoker     Packs/day: 0.00     Quit date: 8/9/2016   Smokeless Tobacco     Never Used       Labs:  Lab Results   Component Value Date    A1C 7.7 01/06/2021     Lab Results   Component Value Date     08/24/2020     Lab Results   Component Value Date    LDL 26 06/18/2020     HDL Cholesterol   Date Value Ref Range Status   06/18/2020 78 >49 mg/dL Final   ]  GFR Estimate   Date Value Ref Range Status   08/24/2020 >90 >60 mL/min/[1.73_m2] Final     Comment:     Non  GFR Calc  Starting 12/18/2018, serum creatinine based estimated GFR (eGFR) will be   calculated using the Chronic Kidney Disease Epidemiology Collaboration   (CKD-EPI) equation.       GFR Estimate If Black   Date Value Ref Range Status   08/24/2020 >90 >60 mL/min/[1.73_m2] Final     Comment:      GFR Calc  Starting 12/18/2018, serum creatinine based estimated GFR (eGFR) will be   calculated using the Chronic Kidney Disease Epidemiology Collaboration   (CKD-EPI) equation.       Lab Results   Component Value Date    CR 0.68 08/24/2020       Health Beliefs and Attitudes:   Patient Activation Measure Survey Score:  RADHA Score (Last Two) 9/15/2011 1/5/2012   RADHA Raw Score 30 35   Activation Score 37.3 45.2   RADHA Level 1 1       Barriers to Learning: Depression. Memory isdues, Inability to sleep soundly.     INTERVENTION:    Education provided today on:  AADE Self-Care Behaviors:  Taking Medication: Dose changes made with Basaglar and " Novolog.     Opportunities for ongoing education and support in diabetes-self management were discussed.    Pt verbalized understanding of concepts discussed and recommendations provided today.       Education Materials Provided:  Cde typed out new insulin doses. Put info sheet inside pt's bg meter bag.     PLAN:  Basaglar: 56 units daily   Novolog: If pre-meal bg > 80: take 12 units for large meal, 6 units for small meal. If pre-meal bg < 80: take 8 units for large meal, 4 units for small meal.     Pt leaves for [place] this Sat to help care for her ill mother. Cde refilled prescriptions for insulin, pen needles and test strips.      FOLLOW-UP:  Keep f/u appt with Dr Medrano in June     Ongoing plan for education and support: As needed.     Time Spent:  40 minutes  Encounter Type: Individual    Any diabetes medication dose changes were made via the CDE Protocol and Collaborative Practice Agreement with the patient's endocrinology provider. A copy of this encounter was shared with the provider.    Holly Muir comes into clinic today at the request of Dr Medrano,  Ordering Provider for Pt Teaching and bg review.     This service provided today was under the supervising provider of the day Dr Hill, who was available if needed.    Mikayla Sellers, RN, BSN, CDE   [facility]

## 2021-05-10 NOTE — PATIENT INSTRUCTIONS
1. Basaglar: increase from 54 to 56 units.       2. Novolog: If Pre-Meal Blood Sugar is > 80 -   - For a Larger meal: take 12 units  - For a Smaller meal: take 8 units    3. Novolog: If Pre-Meal Blood Sugar is < 80 -  - For a Larger meal; take 8 units  - For a Smaller meal: take 4 units    Mikayla Sellers RN, BSN, CDE   [facility]

## 2021-05-18 ENCOUNTER — TRANSFERRED RECORDS (OUTPATIENT)
Dept: HEALTH INFORMATION MANAGEMENT | Facility: CLINIC | Age: 66
End: 2021-05-18

## 2021-06-30 ENCOUNTER — NURSE TRIAGE (OUTPATIENT)
Dept: FAMILY MEDICINE | Facility: CLINIC | Age: 66
End: 2021-06-30

## 2021-06-30 NOTE — TELEPHONE ENCOUNTER
"    Reason for Disposition    MODERATE swelling of both ankles (e.g., swelling extends up to the knees) AND new onset or worsening    Additional Information    Negative: Chest pain    Negative: Small area of swelling and followed an insect bite to the area    Negative: Followed a knee injury    Negative: Ankle or foot injury    Negative: Pregnant with leg swelling or edema    Negative: Difficulty breathing at rest    Negative: Entire foot is cool or blue in comparison to other side    Negative: SEVERE swelling (e.g., swelling extends above knee, entire leg is swollen, weeping fluid)    Negative: Thigh or calf pain and only 1 side and present > 1 hour    Negative: Thigh, calf, or ankle swelling in only one leg    Negative: Thigh, calf, or ankle swelling in both legs, but one side is definitely more swollen (Exception: longstanding difference between legs)    Negative: Cast on leg or ankle and has increasing pain    Negative: Can't walk or can barely stand (new onset)    Negative: Fever and red area (or area very tender to touch)    Negative: Patient sounds very sick or weak to the triager    Negative: Swelling of face, arm or hands (Exception: slight puffiness of fingers during hot weather)    Negative: Pregnant > 20 weeks and sudden weight gain (i.e., > 2 lbs, 1 kg in one week)    Answer Assessment - Initial Assessment Questions  1. ONSET: \"When did the swelling start?\" (e.g., minutes, hours, days)      Pt has swelling in both legs for one week  2. LOCATION: \"What part of the leg is swollen?\"  \"Are both legs swollen or just one leg?\"      Both legs, swelling to just above the ankle  3. SEVERITY: \"How bad is the swelling?\" (e.g., localized; mild, moderate, severe)   - Localized - small area of swelling localized to one leg   - MILD pedal edema - swelling limited to foot and ankle, pitting edema < 1/4 inch (6 mm) deep, rest and elevation eliminate most or all swelling   - MODERATE edema - swelling of lower leg to " "knee, pitting edema > 1/4 inch (6 mm) deep, rest and elevation only partially reduce swelling   - SEVERE edema - swelling extends above knee, facial or hand swelling present       Indentation that stays, raising legs and it goes down some but not all the way.  4. REDNESS: \"Does the swelling look red or infected?\"      No, no signs of infection  5. PAIN: \"Is the swelling painful to touch?\" If so, ask: \"How painful is it?\"   (Scale 1-10; mild, moderate or severe)      No pain  6. FEVER: \"Do you have a fever?\" If so, ask: \"What is it, how was it measured, and when did it start?\"       no  7. CAUSE: \"What do you think is causing the leg swelling?\"      No known injury, pt has started smoking,   8. MEDICAL HISTORY: \"Do you have a history of heart failure, kidney disease, liver failure, or cancer?\"      Uterus cancer a long time ago  9. RECURRENT SYMPTOM: \"Have you had leg swelling before?\" If so, ask: \"When was the last time?\" \"What happened that time?\"      never  10. OTHER SYMPTOMS: \"Do you have any other symptoms?\" (e.g., chest pain, difficulty breathing)        No other sx  11. PREGNANCY: \"Is there any chance you are pregnant?\" \"When was your last menstrual period?\"         Not asked    Protocols used: LEG SWELLING AND EDEMA-A-OH      "

## 2021-07-05 DIAGNOSIS — M17.10 ARTHRITIS OF KNEE: ICD-10-CM

## 2021-07-05 DIAGNOSIS — M25.562 ARTHRALGIA OF BOTH KNEES: ICD-10-CM

## 2021-07-05 DIAGNOSIS — M25.561 ARTHRALGIA OF BOTH KNEES: ICD-10-CM

## 2021-07-06 RX ORDER — TRAMADOL HYDROCHLORIDE 50 MG/1
50 TABLET ORAL 3 TIMES DAILY PRN
Qty: 90 TABLET | Refills: 0 | Status: SHIPPED | OUTPATIENT
Start: 2021-07-06 | End: 2021-09-28

## 2021-07-20 ENCOUNTER — TRANSFERRED RECORDS (OUTPATIENT)
Dept: HEALTH INFORMATION MANAGEMENT | Facility: CLINIC | Age: 66
End: 2021-07-20

## 2021-07-20 DIAGNOSIS — Z79.4 UNCONTROLLED TYPE 2 DIABETES MELLITUS WITH HYPERGLYCEMIA, WITH LONG-TERM CURRENT USE OF INSULIN (H): ICD-10-CM

## 2021-07-20 DIAGNOSIS — E11.65 UNCONTROLLED TYPE 2 DIABETES MELLITUS WITH HYPERGLYCEMIA, WITH LONG-TERM CURRENT USE OF INSULIN (H): ICD-10-CM

## 2021-07-23 NOTE — TELEPHONE ENCOUNTER
METFORMIN HCL 1,000 MG TABLET      Last Written Prescription Date:  10-14-20  Last Fill Quantity: 180,   # refills: 1  Last Office Visit : 12-11-20  Future Office visit:  none    Routing refill request to provider for review/approval because:  Overdue lab A1c    Scheduling has been notified to contact the pt for appointment.

## 2021-07-23 NOTE — TELEPHONE ENCOUNTER
7/23 Called and spoke to patient. She is currently scheduled for follow up.     Swathi grey Procedure   Orthopedics, Podiatry, Sports Medicine, ENT/Eye Specialties  [facility]   [phone]

## 2021-08-07 DIAGNOSIS — E88.819 INSULIN RESISTANCE: ICD-10-CM

## 2021-08-10 NOTE — TELEPHONE ENCOUNTER
pioglitazone (ACTOS) 45 MG tablet      Last Written Prescription Date:  6/4/2020  Last Fill Quantity: 90,   # refills: 3  Last Office Visit : 12/11/2020  Future Office visit:  1/14/2022    Routing refill request to provider for review/approval because:  Failed medication protocol: labs due  - ALT, AST, A1C

## 2021-08-11 RX ORDER — PIOGLITAZONEHYDROCHLORIDE 45 MG/1
45 TABLET ORAL DAILY
Qty: 90 TABLET | Refills: 3 | Status: SHIPPED | OUTPATIENT
Start: 2021-08-11 | End: 2022-08-25

## 2021-08-31 ENCOUNTER — MEDICAL CORRESPONDENCE (OUTPATIENT)
Dept: HEALTH INFORMATION MANAGEMENT | Facility: CLINIC | Age: 66
End: 2021-08-31
Payer: MEDICARE

## 2021-08-31 ENCOUNTER — PATIENT OUTREACH (OUTPATIENT)
Dept: GERIATRIC MEDICINE | Facility: CLINIC | Age: 66
End: 2021-08-31

## 2021-08-31 NOTE — PROGRESS NOTES
CC received notification of Emergency Room visit.  ER visit occurred on 8/30/21 at [facility]  with Dx of Hyperglycemia.    CC contacted member and reviewed discharge summary.  Member has a follow-up appointment with PCP: Yes: scheduled on 9/16/21  Member has had a change in condition: No  New referrals placed: No  Home Visit Needed: No  Care plan reviewed and updated.  PCP notified of ED visit via EMR.  Holly states she was given a new prescription for her insulin and has enough until she sees Dr King on 9/16.  Simran Forbes RN BA N  [facility] Case Management  [phone]

## 2021-09-02 ENCOUNTER — TELEPHONE (OUTPATIENT)
Dept: FAMILY MEDICINE | Facility: CLINIC | Age: 66
End: 2021-09-02

## 2021-09-03 NOTE — TELEPHONE ENCOUNTER
Form faxed to [phone] and placed in scan bin to be scanned into chart.      Terrie PRITCHARD (R)(QUINN)

## 2021-09-16 ENCOUNTER — OFFICE VISIT (OUTPATIENT)
Dept: FAMILY MEDICINE | Facility: CLINIC | Age: 66
End: 2021-09-16
Payer: MEDICARE

## 2021-09-16 VITALS
HEART RATE: 69 BPM | DIASTOLIC BLOOD PRESSURE: 76 MMHG | BODY MASS INDEX: 37.13 KG/M2 | SYSTOLIC BLOOD PRESSURE: 114 MMHG | OXYGEN SATURATION: 97 % | TEMPERATURE: 98.4 F | HEIGHT: 62 IN | WEIGHT: 201.8 LBS

## 2021-09-16 DIAGNOSIS — E28.39 ESTROGEN DEFICIENCY: ICD-10-CM

## 2021-09-16 DIAGNOSIS — Z93.3 COLOSTOMY IN PLACE (H): ICD-10-CM

## 2021-09-16 DIAGNOSIS — E66.01 MORBID OBESITY (H): ICD-10-CM

## 2021-09-16 DIAGNOSIS — R25.1 TREMOR: ICD-10-CM

## 2021-09-16 DIAGNOSIS — K21.9 GASTROESOPHAGEAL REFLUX DISEASE WITHOUT ESOPHAGITIS: ICD-10-CM

## 2021-09-16 DIAGNOSIS — E78.5 HYPERLIPIDEMIA LDL GOAL <100: ICD-10-CM

## 2021-09-16 DIAGNOSIS — Z00.00 MEDICARE ANNUAL WELLNESS VISIT, SUBSEQUENT: Primary | ICD-10-CM

## 2021-09-16 DIAGNOSIS — G25.81 RESTLESS LEG SYNDROME: ICD-10-CM

## 2021-09-16 DIAGNOSIS — M46.96 UNSPECIFIED INFLAMMATORY SPONDYLOPATHY, LUMBAR REGION (H): ICD-10-CM

## 2021-09-16 DIAGNOSIS — R05.9 COUGH: ICD-10-CM

## 2021-09-16 DIAGNOSIS — F25.9 SCHIZOAFFECTIVE DISORDER, UNSPECIFIED TYPE (H): ICD-10-CM

## 2021-09-16 DIAGNOSIS — E11.649 UNCONTROLLED TYPE 2 DIABETES MELLITUS WITH HYPOGLYCEMIA WITHOUT COMA (H): ICD-10-CM

## 2021-09-16 DIAGNOSIS — G89.4 CHRONIC PAIN SYNDROME: ICD-10-CM

## 2021-09-16 DIAGNOSIS — R09.81 NASAL CONGESTION: ICD-10-CM

## 2021-09-16 DIAGNOSIS — I10 HTN, GOAL BELOW 140/90: ICD-10-CM

## 2021-09-16 PROCEDURE — G0439 PPPS, SUBSEQ VISIT: HCPCS | Performed by: INTERNAL MEDICINE

## 2021-09-16 PROCEDURE — U0005 INFEC AGEN DETEC AMPLI PROBE: HCPCS | Performed by: INTERNAL MEDICINE

## 2021-09-16 PROCEDURE — 99214 OFFICE O/P EST MOD 30 MIN: CPT | Mod: 25 | Performed by: INTERNAL MEDICINE

## 2021-09-16 PROCEDURE — U0003 INFECTIOUS AGENT DETECTION BY NUCLEIC ACID (DNA OR RNA); SEVERE ACUTE RESPIRATORY SYNDROME CORONAVIRUS 2 (SARS-COV-2) (CORONAVIRUS DISEASE [COVID-19]), AMPLIFIED PROBE TECHNIQUE, MAKING USE OF HIGH THROUGHPUT TECHNOLOGIES AS DESCRIBED BY CMS-2020-01-R: HCPCS | Performed by: INTERNAL MEDICINE

## 2021-09-16 RX ORDER — RAMIPRIL 5 MG/1
5 CAPSULE ORAL DAILY
Qty: 90 CAPSULE | Refills: 3 | Status: SHIPPED | OUTPATIENT
Start: 2021-09-16 | End: 2022-08-23

## 2021-09-16 RX ORDER — PROPRANOLOL HYDROCHLORIDE 80 MG/1
CAPSULE, EXTENDED RELEASE ORAL
Qty: 90 CAPSULE | Refills: 3 | Status: SHIPPED | OUTPATIENT
Start: 2021-09-16 | End: 2022-08-23

## 2021-09-16 RX ORDER — ROPINIROLE 0.25 MG/1
0.25 TABLET, FILM COATED ORAL EVERY EVENING
Qty: 90 TABLET | Refills: 3 | Status: SHIPPED | OUTPATIENT
Start: 2021-09-16 | End: 2022-08-23

## 2021-09-16 ASSESSMENT — ACTIVITIES OF DAILY LIVING (ADL)
CURRENT_FUNCTION: MEDICATION ADMINISTRATION REQUIRES ASSISTANCE
CURRENT_FUNCTION: BATHING REQUIRES ASSISTANCE
CURRENT_FUNCTION: PREPARING MEALS REQUIRES ASSISTANCE
CURRENT_FUNCTION: TRANSPORTATION REQUIRES ASSISTANCE
CURRENT_FUNCTION: HOUSEWORK REQUIRES ASSISTANCE
CURRENT_FUNCTION: LAUNDRY REQUIRES ASSISTANCE
CURRENT_FUNCTION: MONEY MANAGEMENT REQUIRES ASSISTANCE
CURRENT_FUNCTION: SHOPPING REQUIRES ASSISTANCE

## 2021-09-16 ASSESSMENT — MIFFLIN-ST. JEOR: SCORE: 1413.61

## 2021-09-16 ASSESSMENT — ENCOUNTER SYMPTOMS
DIZZINESS: 1
SORE THROAT: 0
NAUSEA: 1
SHORTNESS OF BREATH: 0
MYALGIAS: 0
CHILLS: 0
FREQUENCY: 1
DYSURIA: 0
HEMATOCHEZIA: 0
HEADACHES: 1
PARESTHESIAS: 0
ABDOMINAL PAIN: 0
JOINT SWELLING: 0
HEMATURIA: 0
PALPITATIONS: 0
BREAST MASS: 0
COUGH: 1
CONSTIPATION: 0
EYE PAIN: 0
WEAKNESS: 1
HEARTBURN: 0
DIARRHEA: 0
ARTHRALGIAS: 0
NERVOUS/ANXIOUS: 1
FEVER: 0

## 2021-09-16 NOTE — PATIENT INSTRUCTIONS
If COVID 19 test is negative, schedule for fasting lab appointment and nurse visit for pneumonia and flu vaccine     Mammogram and bone density: Call [phone] to schedule at [facility]     Return if new or worsening symptoms.     At your visit today, we discussed your risk for falls and preventive options.    Fall Prevention  Falls often occur due to slipping, tripping or losing your balance. Millions of people fall every year and injure themselves. Here are ways to reduce your risk of falling again.     Think about your fall, was there anything that caused your fall that can be fixed, removed, or replaced?    Make your home safe by keeping walkways clear of objects you may trip over, such as electric cords.    Use non-slip pads under rugs. Don't use area rugs or small throw rugs.    Use non-slip mats in bathtubs and showers.    Install handrails and lights on staircases. The handrails should be on both sides of the stairs.    Don't walk in poorly lit areas.    Don't stand on chairs or wobbly ladders.    Use caution when reaching overhead or looking upward. This position can cause a loss of balance.    Be sure your shoes fit properly, have non-slip bottoms and are in good condition.     Wear shoes both inside and out. Don't go barefoot or wear slippers.    Be cautious when going up and down stairs, curbs, and when walking on uneven sidewalks.    If your balance is poor, consider using a cane or walker.    If your fall was related to alcohol use, stop or limit alcohol intake.     If your fall was related to use of sleeping medicines, talk to your healthcare provider about this. You may need to reduce your dosage at bedtime if you awaken during the night to go to the bathroom.      To reduce the need for nighttime bathroom trips:  ? Don't drink fluids for several hours before going to bed  ? Empty your bladder before going to bed  ? Men can keep a urinal at the  bedside    Stay as active as you can. Balance, flexibility, strength, and endurance all come from exercise. They all play a role in preventing falls. Ask your healthcare provider which types of activity are right for you.    Get your vision checked on a regular basis.    If you have pets, know where they are before you stand up or walk so you don't trip over them.    Use night lights.    Go over all your medicines with a pharmacist or other healthcare provider to see if any of them could make you more likely to fall.  [facility] last reviewed this educational content on 4/1/2018 2000-2021 [employer]. All rights reserved. This information is not intended as a substitute for professional medical care. Always follow your healthcare professional's instructions.

## 2021-09-16 NOTE — PROGRESS NOTES
"SUBJECTIVE:   Holly Muir is a 66 year old female who presents for Preventive Visit.      Patient has been advised of split billing requirements and indicates understanding: No   Are you in the first 12 months of your Medicare coverage?  No    Came back from [place] 2-3 weeks ago.   Started getting a cough a week ago. Split out phlegm. No trouble breathing. Enedina running. Eyes watery. Taking allergy pills. Benadryl twice a day. It helps.   .No fever, chills, no shortness of breath.   Chronic LLOYD isteps, not new.     Fell 5 times at her mother's place. Mother was hospitalized. Pt didn't seek medical care. No injuries.     Her boyfriend in [place]  from covid. Pt spent time with him about 1-2 weeks before he was hospitalized and then  from covid complications.         Healthy Habits:     In general, how would you rate your overall health?  Fair    Frequency of exercise:  None    Do you usually eat at least 4 servings of fruit and vegetables a day, include whole grains    & fiber and avoid regularly eating high fat or \"junk\" foods?  Yes    Taking medications regularly:  Yes    Barriers to taking medications:  Problems remembering to take them    Medication side effects:  Other    Ability to successfully perform activities of daily living:  Transportation requires assistance, shopping requires assistance, preparing meals requires assistance, housework requires assistance, bathing requires assistance, laundry requires assistance, medication administration requires assistance and money management requires assistance    Home Safety:  Lack of grab bars in the bathroom    Hearing Impairment:  Difficulty following a conversation in a noisy restaurant or crowded room, feel that people are mumbling or not speaking clearly and difficulty understanding soft or whispered speech    In the past 6 months, have you been bothered by leaking of urine?  No    In general, how would you rate your overall mental or emotional " health?  Fair      PHQ-2 Total Score: 2    Additional concerns today:  No    Do you feel safe in your environment? Yes    Have you ever done Advance Care Planning? (For example, a Health Directive, POLST, or a discussion with a medical provider or your loved ones about your wishes): No, advance care planning information given to patient to review.  Patient declined advance care planning discussion at this time.       Fall risk  Fallen 2 or more times in the past year?: Yes  Any fall with injury in the past year?: No  Timed Up and Go Test (>13.5 is fall risk; contact physician) : 19    Cognitive Screening   1) Repeat 3 items (Leader, Season, Table)    2) Clock draw: NORMAL  3) 3 item recall: Recalls 2 objects   Results: NORMAL clock, 1-2 items recalled: COGNITIVE IMPAIRMENT LESS LIKELY    Mini-CogTM Copyright S Mya. Licensed by the author for use in [facility]; reprinted with permission (doris@[facility]). All rights reserved.      Do you have sleep apnea, excessive snoring or daytime drowsiness?: yes    Reviewed and updated as needed this visit by clinical staff  Tobacco  Allergies  Meds   Med Hx  Surg Hx  Fam Hx  Soc Hx        Reviewed and updated as needed this visit by Provider    Meds             Social History     Tobacco Use     Smoking status: Former Smoker     Packs/day: 0.00     Quit date: 2016     Years since quittin.1     Smokeless tobacco: Never Used   Substance Use Topics     Alcohol use: Yes     Alcohol/week: 0.0 standard drinks     Comment: social occasions         Alcohol Use 2021   Prescreen: >3 drinks/day or >7 drinks/week? No           -------------------------------------    Current providers sharing in care for this patient include:   Patient Care Team:  Tiffany King MD PhD as PCP - General (Internal Medicine)  Priscilla Dugan, PhD LP as Other (see comments) (Psychology)  Teodora Yusuf MD as MD (Urology)  Fatmata Doherty MD as MD (Colon and  Rectal Surgery)  David lEias AuD as Audiologist (Audiology)  Mikayla Sellers, RN as Diabetes Educator (Diabetes Education)  Rossy Harding MD as MD (Dermatology)  Randolph Cook MD as MD (Ophthalmology)  Francesco Winn MD as MD (Ophthalmology)  Prachi Chou MD as MD (Psychiatry)  Tiffany King MD PhD as Assigned PCP  Elizabeth Medrano MD as Assigned Endocrinology Provider  Teodora Yusuf MD as Assigned Surgical Provider  Divya Smalls RN as Lead Care Coordinator (Primary Care - CC)    The following health maintenance items are reviewed in Epic and correct as of today:  Health Maintenance Due   Topic Date Due     DIABETIC FOOT EXAM  06/27/2020     EYE EXAM  12/04/2020     MAMMO SCREENING  12/17/2020     Pneumococcal Vaccine: Pediatrics (0 to 5 Years) and At-Risk Patients (6 to 64 Years) (2 of 2 - PPSV23) 03/14/2021     Pneumococcal Vaccine: 65+ Years (2 of 2 - PPSV23) 03/14/2021     A1C  04/06/2021     LIPID  06/18/2021     BMP  08/24/2021     MICROALBUMIN  08/24/2021     URINE DRUG SCREEN  08/24/2021     INFLUENZA VACCINE (1) 09/01/2021     PHQ-9  09/30/2021     Labs reviewed in [facility]  Mammogram Screening: Mammogram Screening: Recommended mammography every 1-2 years with patient discussion and risk factor consideration    Breast CA Risk Assessment (FHS-7) 9/16/2021   Do you have a family history of breast, colon, or ovarian cancer? No / Unknown         Mammogram Screening: Recommended mammography every 1-2 years with patient discussion and risk factor consideration  Pertinent mammograms are reviewed under the imaging tab.    Review of Systems   Constitutional: Negative for chills and fever.   HENT: Positive for congestion (allergies) and hearing loss (has hearing aids). Negative for ear pain and sore throat.    Eyes: Negative for pain and visual disturbance.   Respiratory: Positive for cough (from postnasal drip). Negative for shortness of breath.    Cardiovascular: Positive for peripheral edema  "(while in [place], feet swelled up. but resolved by now. ). Negative for chest pain and palpitations.   Gastrointestinal: Positive for nausea (once in a great while. ). Negative for abdominal pain, constipation, diarrhea, heartburn and hematochezia.   Breasts:  Negative for tenderness, breast mass and discharge.   Genitourinary: Positive for frequency. Negative for dysuria, genital sores, hematuria, pelvic pain, urgency, vaginal bleeding and vaginal discharge.   Musculoskeletal: Negative for arthralgias, joint swelling and myalgias.   Skin: Negative for rash.   Neurological: Positive for dizziness (started in [place], sees things turn if moving too fast. ), weakness (legs and arms do not have the strengths as they used  to. ) and headaches (new, feels headache when bends down ). Negative for paresthesias.   Psychiatric/Behavioral: Positive for mood changes (a little more depressed today. her boyfriend passed away in [place], his burial is today. ). The patient is nervous/anxious.        OBJECTIVE:   /76   Pulse 69   Temp 98.4  F (36.9  C) (Oral)   Ht 1.575 m (5' 2\")   Wt 91.5 kg (201 lb 12.8 oz)   SpO2 97%   BMI 36.91 kg/m   Estimated body mass index is 36.91 kg/m  as calculated from the following:    Height as of this encounter: 1.575 m (5' 2\").    Weight as of this encounter: 91.5 kg (201 lb 12.8 oz).  Physical Exam  GENERAL: healthy, alert and no distress  NECK: no adenopathy, no asymmetry, masses, or scars and thyroid normal to palpation  RESP: lungs clear to auscultation - no rales, rhonchi or wheezes  CV: regular rate and rhythm, normal S1 S2, no S3 or S4, no murmur, click or rub, no peripheral edema and peripheral pulses strong  ABDOMEN: soft, nontender, no hepatosplenomegaly, no masses and bowel sounds normal  MS: no gross musculoskeletal defects noted, no edema    Diagnostic Test Results:    ASSESSMENT / PLAN:   Holly was seen today for wellness visit.    Diagnoses and all orders for " this visit:    Medicare annual wellness visit, subsequent    Unspecified inflammatory spondylopathy, lumbar region (H)  Comments:  follows with rheumatology    Colostomy in place (H)  Comments:  pt lives with her daughter who assists in stoma care.     Schizoaffective disorder, unspecified type (H)  Comments:  follows with psychiatrist    Morbid obesity (H)  Comments:  lost 10 lbs in the last few months.     Cough  Comments:  travel history, boyfriend  from covid, no close contact. will rule out covid.   Orders:  -     Cancel: CRP inflammation; Future  -     Cancel: Symptomatic COVID-19 Virus (Coronavirus) by PCR Nose; Future  -     Symptomatic COVID-19 Virus (Coronavirus) by PCR Nose    Nasal congestion  Comments:  nasal spray/otc decongestant as she si doing.   Orders:  -     Cancel: Symptomatic COVID-19 Virus (Coronavirus) by PCR Nose; Future    Uncontrolled type 2 diabetes mellitus with hypoglycemia without coma (H)  Comments:  follows with endocrine  Orders:  -     Hemoglobin A1c; Future    due for labs  -     Lipid Profile; Future    HTN, goal below 140/90  Comments:  controlled   Orders:  -     Comprehensive metabolic panel; Future  -     Albumin Random Urine Quantitative with Creat Ratio; Future  -     Cancel: CBC with platelets; Future  -     ramipril (ALTACE) 5 MG capsule; Take 1 capsule (5 mg) by mouth daily    Chronic pain syndrome  Comments:  on tramadol. consistent use. due for drug screen.   Orders:  -     Drug Confirmation Panel Urine with Creat - lab collect; Future    Estrogen deficiency  Comments:  screen for osteoporosis  Orders:  -     DX Hip/Pelvis/Spine; Future    Gastroesophageal reflux disease without esophagitis  -     omeprazole (PRILOSEC) 20 MG DR capsule; Take 2 capsules (40 mg) by mouth daily *Take 30-60 Minutes before a meal*    Tremor  -     propranolol ER (INDERAL LA) 80 MG 24 hr capsule; TAKE 1 CAPSULE BY MOUTH EVERY DAY    Restless leg syndrome  -     rOPINIRole (REQUIP) 0.25  "MG tablet; Take 1 tablet (0.25 mg) by mouth every evening        Patient has been advised of split billing requirements and indicates understanding: No  COUNSELING:  Reviewed preventive health counseling, as reflected in patient instructions    Estimated body mass index is 36.91 kg/m  as calculated from the following:    Height as of this encounter: 1.575 m (5' 2\").    Weight as of this encounter: 91.5 kg (201 lb 12.8 oz).    Weight management plan: weight stable.     She reports that she quit smoking about 5 years ago. She smoked 0.00 packs per day. She has never used smokeless tobacco.      Appropriate preventive services were discussed with this patient, including applicable screening as appropriate for cardiovascular disease, diabetes, osteopenia/osteoporosis, and glaucoma.  As appropriate for age/gender, discussed screening for colorectal cancer, prostate cancer, breast cancer, and cervical cancer. Checklist reviewing preventive services available has been given to the patient.    Reviewed patients plan of care and provided an AVS. The Complex Care Plan (for patients with higher acuity and needing more deliberate coordination of services) for Holly meets the Care Plan requirement. This Care Plan has been established and reviewed with the Patient.    Counseling Resources:  ATP IV Guidelines  Pooled Cohorts Equation Calculator  Breast Cancer Risk Calculator  Breast Cancer: Medication to Reduce Risk  FRAX Risk Assessment  ICSI Preventive Guidelines  Dietary Guidelines for Americans, 2010  USDA's MyPlate  ASA Prophylaxis  Lung CA Screening    Tiffany King MD PhD  [facility]    Identified Health Risks:  "

## 2021-09-17 LAB — SARS-COV-2 RNA RESP QL NAA+PROBE: NEGATIVE

## 2021-09-17 NOTE — RESULT ENCOUNTER NOTE
Dear Holly,   Your recent test result are within acceptable range or at baseline. Please continue with your current plan of care.       Please call or Mychart to our office if you have further questions.     Tiffany King MD-PhD Quality 47: Advance Care Plan: Advance Care Planning discussed and documented in the medical record; patient did not wish or was not able to name a surrogate decision maker or provide an advance care plan. Detail Level: Detailed Quality 131: Pain Assessment And Follow-Up: Pain assessment using a standardized tool is documented as negative, no follow-up plan required Quality 155 (Denominator): Falls Plan Of Care: Plan of Care not Documented, Reason not Otherwise Specified Quality 111:Pneumonia Vaccination Status For Older Adults: Pneumococcal Vaccination Previously Received Quality 402: Tobacco Use And Help With Quitting Among Adolescents: Patient screened for tobacco and never smoked Quality 154 Part B: Falls: Risk Screening (Should Be Reported With Measure 155.): Patient screened for future fall risk; documentation of no falls in the past year or only one fall without injury in the past year Quality 154 Part A: Falls: Risk Assessment (Should Be Reported With Measure 155.): Falls risk assessment completed and documented in the past 12 months. Quality 431: Preventive Care And Screening: Unhealthy Alcohol Use - Screening: Patient screened for unhealthy alcohol use using a single question and scores less than 2 times per year Quality 110: Preventive Care And Screening: Influenza Immunization: Influenza Immunization previously received during influenza season

## 2021-09-20 ENCOUNTER — ANCILLARY PROCEDURE (OUTPATIENT)
Dept: BONE DENSITY | Facility: CLINIC | Age: 66
End: 2021-09-20
Attending: INTERNAL MEDICINE
Payer: MEDICARE

## 2021-09-20 DIAGNOSIS — E28.39 ESTROGEN DEFICIENCY: ICD-10-CM

## 2021-09-20 PROCEDURE — 77080 DXA BONE DENSITY AXIAL: CPT | Performed by: RADIOLOGY

## 2021-09-22 PROBLEM — G25.81 RESTLESS LEG SYNDROME: Status: ACTIVE | Noted: 2021-09-22

## 2021-09-22 PROBLEM — R25.1 TREMOR: Status: ACTIVE | Noted: 2021-09-22

## 2021-09-27 ENCOUNTER — OFFICE VISIT (OUTPATIENT)
Dept: ORTHOPEDICS | Facility: CLINIC | Age: 66
End: 2021-09-27
Payer: MEDICARE

## 2021-09-27 DIAGNOSIS — M17.12 PRIMARY OSTEOARTHRITIS OF LEFT KNEE: Primary | ICD-10-CM

## 2021-09-27 DIAGNOSIS — M17.10 ARTHRITIS OF KNEE: ICD-10-CM

## 2021-09-27 DIAGNOSIS — M17.11 PRIMARY OSTEOARTHRITIS OF RIGHT KNEE: ICD-10-CM

## 2021-09-27 DIAGNOSIS — M25.562 ARTHRALGIA OF BOTH KNEES: ICD-10-CM

## 2021-09-27 DIAGNOSIS — M25.561 ARTHRALGIA OF BOTH KNEES: ICD-10-CM

## 2021-09-27 PROCEDURE — 20610 DRAIN/INJ JOINT/BURSA W/O US: CPT | Mod: 50 | Performed by: FAMILY MEDICINE

## 2021-09-27 PROCEDURE — 99207 PR DROP WITH A PROCEDURE: CPT | Performed by: FAMILY MEDICINE

## 2021-09-27 RX ORDER — TRIAMCINOLONE ACETONIDE 40 MG/ML
40 INJECTION, SUSPENSION INTRA-ARTICULAR; INTRAMUSCULAR
Status: DISCONTINUED | OUTPATIENT
Start: 2021-09-27 | End: 2022-09-14

## 2021-09-27 RX ADMIN — TRIAMCINOLONE ACETONIDE 40 MG: 40 INJECTION, SUSPENSION INTRA-ARTICULAR; INTRAMUSCULAR at 10:52

## 2021-09-27 NOTE — PATIENT INSTRUCTIONS
Thanks for coming today.  Ortho/Sports Medicine Clinic  [address] [place] [address]    To schedule future appointments in Ortho Clinic, you may call [phone].    To schedule ordered imaging by your Provider: Call [facility] at [phone]    [facility] available online at:   [email or web address]    Please call if any further questions or concerns [phone] and ask for the Orthopedic Department. Clinic hours 8 am to 5 pm.    Return to clinic if symptoms worsen.

## 2021-09-27 NOTE — PROGRESS NOTES
Large Joint Injection/Arthocentesis: bilateral knee    Date/Time: 9/27/2021 10:52 AM  Performed by: Santy Harrington DO  Authorized by: Santy Harrington DO     Indications:  Pain  Needle Size:  22 G  Guidance: landmark guided    Approach:  Lateral  Location:  Knee  Laterality:  Bilateral      Medications (Right):  40 mg triamcinolone 40 MG/ML  Medications (Left):  40 mg triamcinolone 40 MG/ML  Outcome:  Tolerated well, no immediate complications  Procedure discussed: discussed risks, benefits, and alternatives    Consent Given by:  Patient  Timeout: timeout called immediately prior to procedure    Prep: patient was prepped and draped in usual sterile fashion         PROCEDURE    Knee Injections - Intraarticular    The patient was informed of the risks and the benefits of the procedure and a written consent was signed.    The patient s left knee was prepped with chlorhexidine in sterile fashion.   40 mg of triamcinolone suspension was drawn up into a 5 mL syringe with 4 mL of 1% lidocaine.  Injection was performed using substerile technique.  A 1.5-inch 22-gauge needle was used to enter the lateral aspect of the left knee.  Injection performed successfully without difficulty.  There were no complications. The patient tolerated the procedure well. There was negligible bleeding.     The patient's right knee was prepped with chlorhexidine in sterile fashion.   40 mg of triamcinolone suspension was drawn up into a 5 mL syringe with 4 mL of 1% lidocaine.  Injection was performed using substerile technique.  A 1.5-inch 22-gauge needle was used to enter the lateral aspect of the right knee.  Injection performed successfully without difficulty.  There were no complications. The patient tolerated the procedure well. There was negligible bleeding.           Scribed by Sophie Osborne ATC for Dr. Harrington on 9/27/21 at 10:45 AM, based on the providers statements to me.

## 2021-09-27 NOTE — TELEPHONE ENCOUNTER
Routing refill request to provider for review/approval because:  Drug not on the [facility] refill protocol   Felicia Acuna BSN, RN

## 2021-09-27 NOTE — LETTER
9/27/2021         RE: Holly Muir  [address] [address]        Dear Colleague,    Thank you for referring your patient, Holly Muir, to the [facility] [place]. Please see a copy of my visit note below.    Large Joint Injection/Arthocentesis: bilateral knee    Date/Time: 9/27/2021 10:52 AM  Performed by: Santy Harrington DO  Authorized by: Santy Harrington DO     Indications:  Pain  Needle Size:  22 G  Guidance: landmark guided    Approach:  Lateral  Location:  Knee  Laterality:  Bilateral      Medications (Right):  40 mg triamcinolone 40 MG/ML  Medications (Left):  40 mg triamcinolone 40 MG/ML  Outcome:  Tolerated well, no immediate complications  Procedure discussed: discussed risks, benefits, and alternatives    Consent Given by:  Patient  Timeout: timeout called immediately prior to procedure    Prep: patient was prepped and draped in usual sterile fashion         PROCEDURE    Knee Injections - Intraarticular    The patient was informed of the risks and the benefits of the procedure and a written consent was signed.    The patient s left knee was prepped with chlorhexidine in sterile fashion.   40 mg of triamcinolone suspension was drawn up into a 5 mL syringe with 4 mL of 1% lidocaine.  Injection was performed using substerile technique.  A 1.5-inch 22-gauge needle was used to enter the lateral aspect of the left knee.  Injection performed successfully without difficulty.  There were no complications. The patient tolerated the procedure well. There was negligible bleeding.     The patient's right knee was prepped with chlorhexidine in sterile fashion.   40 mg of triamcinolone suspension was drawn up into a 5 mL syringe with 4 mL of 1% lidocaine.  Injection was performed using substerile technique.  A 1.5-inch 22-gauge needle was used to enter the lateral aspect of the right knee.  Injection performed successfully without difficulty.   There were no complications. The patient tolerated the procedure well. There was negligible bleeding.           Scribed by Sophie Osborne ATC for Dr. Harrington on 9/27/21 at 10:45 AM, based on the providers statements to me.       Again, thank you for allowing me to participate in the care of your patient.        Sincerely,        Santy Harrington, DO

## 2021-09-27 NOTE — RESULT ENCOUNTER NOTE
Dear Holly,   Your recent test results showed the following:  -- bone density is normal.    Please call or Mychart to our office if you have further questions.     Tiffany King MD-PhD

## 2021-09-28 RX ORDER — TRAMADOL HYDROCHLORIDE 50 MG/1
TABLET ORAL
Qty: 90 TABLET | Refills: 0 | Status: SHIPPED | OUTPATIENT
Start: 2021-09-28 | End: 2021-11-05

## 2021-09-30 ENCOUNTER — TRANSFERRED RECORDS (OUTPATIENT)
Dept: HEALTH INFORMATION MANAGEMENT | Facility: CLINIC | Age: 66
End: 2021-09-30

## 2021-10-07 DIAGNOSIS — M25.561 ARTHRALGIA OF BOTH KNEES: ICD-10-CM

## 2021-10-07 DIAGNOSIS — S92.352A CLOSED FRACTURE OF FIFTH METATARSAL BONE OF LEFT FOOT, PHYSEAL INVOLVEMENT UNSPECIFIED, INITIAL ENCOUNTER: ICD-10-CM

## 2021-10-07 DIAGNOSIS — M17.10 ARTHRITIS OF KNEE: ICD-10-CM

## 2021-10-07 DIAGNOSIS — M25.562 ARTHRALGIA OF BOTH KNEES: ICD-10-CM

## 2021-10-08 ENCOUNTER — ANCILLARY PROCEDURE (OUTPATIENT)
Dept: MAMMOGRAPHY | Facility: CLINIC | Age: 66
End: 2021-10-08
Attending: INTERNAL MEDICINE
Payer: MEDICARE

## 2021-10-08 ENCOUNTER — TELEPHONE (OUTPATIENT)
Dept: ENDOCRINOLOGY | Facility: CLINIC | Age: 66
End: 2021-10-08

## 2021-10-08 ENCOUNTER — LAB (OUTPATIENT)
Dept: LAB | Facility: CLINIC | Age: 66
End: 2021-10-08
Payer: MEDICARE

## 2021-10-08 DIAGNOSIS — I10 HTN, GOAL BELOW 140/90: ICD-10-CM

## 2021-10-08 DIAGNOSIS — G89.4 CHRONIC PAIN SYNDROME: ICD-10-CM

## 2021-10-08 DIAGNOSIS — Z12.31 SCREENING MAMMOGRAM, ENCOUNTER FOR: ICD-10-CM

## 2021-10-08 DIAGNOSIS — E11.649 UNCONTROLLED TYPE 2 DIABETES MELLITUS WITH HYPOGLYCEMIA WITHOUT COMA (H): ICD-10-CM

## 2021-10-08 DIAGNOSIS — E78.5 HYPERLIPIDEMIA LDL GOAL <100: ICD-10-CM

## 2021-10-08 LAB
ALBUMIN SERPL-MCNC: 3.5 G/DL (ref 3.4–5)
ALP SERPL-CCNC: 102 U/L (ref 40–150)
ALT SERPL W P-5'-P-CCNC: 35 U/L (ref 0–50)
ANION GAP SERPL CALCULATED.3IONS-SCNC: 4 MMOL/L (ref 3–14)
AST SERPL W P-5'-P-CCNC: 22 U/L (ref 0–45)
BILIRUB SERPL-MCNC: 0.3 MG/DL (ref 0.2–1.3)
BUN SERPL-MCNC: 12 MG/DL (ref 7–30)
CALCIUM SERPL-MCNC: 9.3 MG/DL (ref 8.5–10.1)
CHLORIDE BLD-SCNC: 98 MMOL/L (ref 94–109)
CHOLEST SERPL-MCNC: 134 MG/DL
CO2 SERPL-SCNC: 29 MMOL/L (ref 20–32)
CREAT SERPL-MCNC: 0.73 MG/DL (ref 0.52–1.04)
CREAT UR-MCNC: 112 MG/DL
CREAT UR-MCNC: 112 MG/DL
FASTING STATUS PATIENT QL REPORTED: YES
GFR SERPL CREATININE-BSD FRML MDRD: 87 ML/MIN/1.73M2
GLUCOSE BLD-MCNC: 137 MG/DL (ref 70–99)
HBA1C MFR BLD: 10.5 % (ref 0–5.6)
HDLC SERPL-MCNC: 76 MG/DL
LDLC SERPL CALC-MCNC: 39 MG/DL
MICROALBUMIN UR-MCNC: 56 MG/L
MICROALBUMIN/CREAT UR: 50 MG/G CR (ref 0–25)
NONHDLC SERPL-MCNC: 58 MG/DL
POTASSIUM BLD-SCNC: 4.6 MMOL/L (ref 3.4–5.3)
PROT SERPL-MCNC: 7.4 G/DL (ref 6.8–8.8)
SODIUM SERPL-SCNC: 131 MMOL/L (ref 133–144)
TRIGL SERPL-MCNC: 96 MG/DL

## 2021-10-08 PROCEDURE — 82570 ASSAY OF URINE CREATININE: CPT | Mod: 59

## 2021-10-08 PROCEDURE — 77063 BREAST TOMOSYNTHESIS BI: CPT | Mod: GC | Performed by: RADIOLOGY

## 2021-10-08 PROCEDURE — 82043 UR ALBUMIN QUANTITATIVE: CPT

## 2021-10-08 PROCEDURE — 80053 COMPREHEN METABOLIC PANEL: CPT

## 2021-10-08 PROCEDURE — 77067 SCR MAMMO BI INCL CAD: CPT | Mod: GC | Performed by: RADIOLOGY

## 2021-10-08 PROCEDURE — 80061 LIPID PANEL: CPT

## 2021-10-08 PROCEDURE — 83036 HEMOGLOBIN GLYCOSYLATED A1C: CPT

## 2021-10-08 PROCEDURE — 36415 COLL VENOUS BLD VENIPUNCTURE: CPT

## 2021-10-08 PROCEDURE — 80307 DRUG TEST PRSMV CHEM ANLYZR: CPT

## 2021-10-08 RX ORDER — INSULIN ASPART 100 [IU]/ML
INJECTION, SOLUTION INTRAVENOUS; SUBCUTANEOUS
Qty: 30 ML | Refills: 3 | Status: SHIPPED | OUTPATIENT
Start: 2021-10-08 | End: 2022-09-20

## 2021-10-08 RX ORDER — GABAPENTIN 600 MG/1
TABLET ORAL
Qty: 120 TABLET | Refills: 4 | Status: SHIPPED | OUTPATIENT
Start: 2021-10-08 | End: 2022-02-24

## 2021-10-08 NOTE — TELEPHONE ENCOUNTER
[facility] Call Center    Phone Message    May a detailed message be left on voicemail: yes     Reason for Call: Medication Refill Request    Has the patient contacted the pharmacy for the refill? Yes   Name of medication being requested: insulin aspart (NOVOLOG FLEXPEN) 100 UNIT/ML pen  Provider who prescribed the medication: Marcela  Pharmacy:    [facility]Y #[id number] - [place], [place] - [address] [facility]. [address]  Date medication is needed: ASAP - the patient stated she has tried calling a few times for refill request with no response back. Writer did not see any messages in chart. The patient stated she is almost out of this medication. Please advise. Thank you.     Action Taken: Message routed to:  Adult Clinics: Endocrinology p [id number]    Travel Screening: Not Applicable

## 2021-10-08 NOTE — TELEPHONE ENCOUNTER
Writer reached out to patient. She states that she has been trying to call Mikayla's phone line and just keeps getting busy signal. Patient states that she needs refill on Novolog.    Refill completed by writer.       Anahy Bloom RN  Endocrine Care Coordinator  [facility]

## 2021-10-12 LAB
CREATININE URINE MG/DL  (SYNCED VALUE): 112 MG/DL
GABAPENTIN UR QL CFM: PRESENT
N-NORTRAMADOL/CREAT UR CFM: ABNORMAL NG/MG {CREAT}
O-NORTRAMADOL UR CFM-MCNC: ABNORMAL NG/ML
TRAMADOL CTO UR CFM-MCNC: ABNORMAL NG/ML
TRAMADOL/CREAT UR: ABNORMAL

## 2021-10-19 ENCOUNTER — PATIENT OUTREACH (OUTPATIENT)
Dept: GERIATRIC MEDICINE | Facility: CLINIC | Age: 66
End: 2021-10-19

## 2021-10-19 DIAGNOSIS — D50.9 IRON DEFICIENCY ANEMIA, UNSPECIFIED IRON DEFICIENCY ANEMIA TYPE: ICD-10-CM

## 2021-10-19 NOTE — PROGRESS NOTES
[facility] Care Coordination Contact      [facility] Six-Month Telephone Assessment    6 month telephone assessment completed on 10/19/2021.    ER visits: [facility]  [facility] . Hypoglycemia   Hospitalizations: No  TCU stays: No  Significant health status changes: None.  Falls/Injuries: No  ADL/IADL changes: No  Changes in services: No    Caregiver Assessment follow up:  n/a    Goals: See POC in chart for goal progress documentation.  Member states she has a had a Physical, Mammogram, and Bone density test this summer.     Will see member in 6 months for an annual health risk assessment.   Encouraged member to call CC with any questions or concerns in the meantime.     Divya Smalls RN,BSN  [facility] Case Coordinator   [phone]

## 2021-10-20 NOTE — TELEPHONE ENCOUNTER
"Requested Prescriptions   Pending Prescriptions Disp Refills    [facility] VITAMIN C 500 MG tablet [Pharmacy Med Name: CVS VITAMIN C 500 MG TABLET] 45 tablet 3     Si/2 TABLET BY MOUTH JACOBSON WITH IRON SUPPLEMENT BEFORE MEAL        There is no refill protocol information for this order        ferrous gluconate (FERGON) 324 (38 Fe) MG tablet [Pharmacy Med Name: FERROUS GLUCONATE 324 MG TAB] 90 tablet 3     Sig: TAKE 1 TABLET (324 MG) BY MOUTH DAILY (WITH BREAKFAST)        Iron Supplements Failed - 10/19/2021  4:00 PM        Failed - Hgb OR Hct on record within the past 12 mos.     Patient need only have had a HGB or HCT on file in the past 12 mos. That result does not need to be normal.    Recent Labs   Lab Test 10/14/20  1353 20  1424 20  1403   HGB 11.0* 11.2* 11.6*       Recent Labs   Lab Test 20  1424 20  1403 19  0933   HCT 34.9* 36.3 36.9       Please verify a HGB or HCT has been checked SINCE THE LAST DOSE CHANGE.            Passed - Patient is 12 years of age or older        Passed - Recent (12 mo) or future (30 days) visit within the authorizing provider's specialty     Patient has had an office visit with the authorizing provider or a provider within the authorizing providers department within the previous 12 mos or has a future within next 30 days. See \"Patient Info\" tab in inbasket, or \"Choose Columns\" in Meds & Orders section of the refill encounter.              Passed - Medication is active on med list              "

## 2021-10-21 ENCOUNTER — TELEPHONE (OUTPATIENT)
Dept: FAMILY MEDICINE | Facility: CLINIC | Age: 66
End: 2021-10-21

## 2021-10-21 ENCOUNTER — MEDICAL CORRESPONDENCE (OUTPATIENT)
Dept: HEALTH INFORMATION MANAGEMENT | Facility: CLINIC | Age: 66
End: 2021-10-21
Payer: MEDICARE

## 2021-10-21 NOTE — TELEPHONE ENCOUNTER
Reason for call:  Other   Patient called regarding (reason for call): letter  Additional comments: pt is requesting a letter that states she can have her dog live with her for medical reasons. Dog aren't allowed in her building but the dog alerts her when her sugar is low so she needs the dog, this letter needs to be signed and  when this is complete, the patient would like to pick this up.     Phone number to reach patient:  Home number on file [phone] (home)    Best Time:  anytime    Can we leave a detailed message on this number?  YES    Travel screening: Negative

## 2021-10-22 RX ORDER — FERROUS GLUCONATE 324(38)MG
324 TABLET ORAL
Qty: 90 TABLET | Refills: 3 | Status: SHIPPED | OUTPATIENT
Start: 2021-10-22 | End: 2022-10-14

## 2021-10-22 RX ORDER — ASCORBIC ACID 500 MG
TABLET ORAL
Qty: 45 TABLET | Refills: 3 | Status: SHIPPED | OUTPATIENT
Start: 2021-10-22 | End: 2022-10-14

## 2021-10-23 ENCOUNTER — MYC MEDICAL ADVICE (OUTPATIENT)
Dept: FAMILY MEDICINE | Facility: CLINIC | Age: 66
End: 2021-10-23

## 2021-10-24 NOTE — RESULT ENCOUNTER NOTE
Dear Holly,   Your recent test results showed the following:  -- urine protein is present but improved from previous. Continue to work on getting diabetes under better control. Please follow up with Dr. Medrano.     Please call or Mychart to our office if you have further questions.     Tiffany King MD-PhD

## 2021-10-24 NOTE — RESULT ENCOUNTER NOTE
Dear Holly,   Your recent test results showed the following:  -- urine drug screen is acceptable.     Please call or Mychart to our office if you have further questions.     Tiffany King MD-PhD

## 2021-10-24 NOTE — RESULT ENCOUNTER NOTE
Please assist to schedule a virtual visit next week for hyponatremia evaluation. Phone visit is okay.

## 2021-10-24 NOTE — RESULT ENCOUNTER NOTE
Dear Holly,   Your recent test result are within acceptable range or at baseline.   Urine drug screen is acceptable.   Please continue with your current plan of care.       Please call or Mychart to our office if you have further questions.     Tiffany King MD-PhD

## 2021-10-24 NOTE — RESULT ENCOUNTER NOTE
Dr. Medrano ,  Her appointment with you is not until 1/14/2022. Would you or Mikayla be able to follow up with her sooner? I thought I sent this to you but I am not finding any note on this Sorry about the delay.     Tiffany King MD PhD

## 2021-10-25 NOTE — TELEPHONE ENCOUNTER
"Patient has no access to [employer].  Help line number given.    Patient informed on the message below.    After writer finished the message, patient's daughter took the phone and said:  \"She needs the letter from the doctor in order to allow her to keep an animal at her apartment.  She can't keep the animal without the letter.  She is always depressed, she needs a dog for an emotional support.\"      Informed that the first intention of the letter was for medical reasons for which the dog needs to certified.    Can patient have a letter for emotional support dog?    Please advise  Michaelle Jimenez RN    "

## 2021-10-26 NOTE — TELEPHONE ENCOUNTER
Called and spoke to Holly, and read her providers message as written below. She will call and request the letter form her psychiatrist/therapist.     Martha Rodgers RN

## 2021-10-27 ENCOUNTER — TELEPHONE (OUTPATIENT)
Dept: FAMILY MEDICINE | Facility: CLINIC | Age: 66
End: 2021-10-27

## 2021-10-27 NOTE — TELEPHONE ENCOUNTER
----- Message from Rossy Jacob sent at 10/25/2021  6:47 AM CDT -----    ----- Message -----  From: Tiffany King MD PhD  Sent: 10/24/2021  11:48 AM CDT  To: [facility]    Please assist to schedule a virtual visit next week for hyponatremia evaluation. Phone visit is okay.

## 2021-10-28 ENCOUNTER — VIRTUAL VISIT (OUTPATIENT)
Dept: FAMILY MEDICINE | Facility: CLINIC | Age: 66
End: 2021-10-28
Payer: MEDICARE

## 2021-10-28 DIAGNOSIS — Z93.3 S/P COLOSTOMY (H): ICD-10-CM

## 2021-10-28 DIAGNOSIS — F32.3 SEVERE MAJOR DEPRESSION WITH PSYCHOTIC FEATURES (H): ICD-10-CM

## 2021-10-28 DIAGNOSIS — E87.1 HYPONATREMIA: Primary | ICD-10-CM

## 2021-10-28 DIAGNOSIS — E11.65 UNCONTROLLED TYPE 2 DIABETES MELLITUS WITH HYPERGLYCEMIA (H): ICD-10-CM

## 2021-10-28 PROCEDURE — 99443 PR PHYSICIAN TELEPHONE EVALUATION 21-30 MIN: CPT | Mod: 95 | Performed by: INTERNAL MEDICINE

## 2021-10-28 NOTE — LETTER
October 28, 2021      Holly Muir  [address] [facility] [address]    ,          To Whom It May Concern     Holly Muir has complex chronic health issues medical and mental health issues. She is on multiple medications and sees multiple specialists. She will benefit from getting an emotional support pet to help with her depression. Family is planning to have their dog certified as a service dog that can assist in patients with diabetes.     Should you have any questions, please feel free to contact me at the address above.        Sincerely,        Tiffany King MD PhD

## 2021-10-29 ENCOUNTER — TELEPHONE (OUTPATIENT)
Dept: FAMILY MEDICINE | Facility: CLINIC | Age: 66
End: 2021-10-29

## 2021-10-29 ENCOUNTER — TELEPHONE (OUTPATIENT)
Dept: ENDOCRINOLOGY | Facility: CLINIC | Age: 66
End: 2021-10-29

## 2021-10-29 NOTE — TELEPHONE ENCOUNTER
----- Message from Tiffany King MD PhD sent at 10/29/2021 12:56 PM CDT -----  Regarding: RE: connect with patient  Great! Thanks!   ----- Message -----  From: Mikayla Sellers RN  Sent: 10/29/2021   9:09 AM CDT  To: Tiffany King MD PhD  Subject: FW: connect with patient                         David King,    I have not received a message from the Call Center regarding this patient. Nor have our nurses' alerted me to any calls from her. I know the patient well. I will call her to schedule an apppointment for bg and medication review.     Mikayla  ----- Message -----  From: Tiffany King MD PhD  Sent: 10/28/2021  10:35 PM CDT  To: Elizabeth Medrano MD, Mikayla Sellers RN  Subject: connect with patient                             Holly Degroot told me she has been trying to call you with her glucose readings but unable to get through. She has moved in with her daughter Emilia who would like to get more familiar with her insulin regimen and sliding scales. Will you be able to follow up with her?     Thanks!    Tiffany King MD PhD

## 2021-10-29 NOTE — TELEPHONE ENCOUNTER
Writer left  for pt advising her pt has been put on Cde's scheduleeper Dr King's request, for Wed, Nov 3rd at 1:00pm. Pt asked to call [phone], it this appt time will not work for her.     Mikayla Sellers, RN, BSN, CDE   [facility]

## 2021-10-29 NOTE — TELEPHONE ENCOUNTER
Reason for Call:  Other: call back    Detailed comments: Pt calling about status of letter. I told her it was generated, but do not know if it is ready for pickup, as I am not at [facility].     Feel free to dakotah this message as done if completed OR please print and put at  and call pt back when completed.    Phone Number Patient can be reached at: Cell number on file:    Telephone Information:   Mobile [phone]     Thank you,  Call taken on 10/29/2021 at 1:07 PM by Izabela Nelson,   [facility]

## 2021-11-03 ENCOUNTER — ALLIED HEALTH/NURSE VISIT (OUTPATIENT)
Dept: EDUCATION SERVICES | Facility: CLINIC | Age: 66
End: 2021-11-03
Payer: MEDICARE

## 2021-11-03 DIAGNOSIS — E11.40 TYPE 2 DIABETES MELLITUS WITH DIABETIC NEUROPATHY (H): Primary | ICD-10-CM

## 2021-11-03 PROCEDURE — G0108 DIAB MANAGE TRN  PER INDIV: HCPCS

## 2021-11-03 NOTE — PATIENT INSTRUCTIONS
Basaglar: take 56 units daily    NOVOLOG INSULIN - DOSING SCHEDULE:    If Pre-Meal blood sugar is  take:  - 12 units for a large meal  - 6 units for a small meal    If Pre-Meal blood sugar is < 80 take:  - 8 units for a large meal  - 4 units for a small meal    If Pre-Meal blood sugar is > 150 take 1 unit for every 50 points over 150 -  Add this insulin dose to your base dose:  151-200: add 1 unit  201-250: add 2 units  251-300: add 3 units   301-350: add 4 units    For snacks or treats: take 3 units

## 2021-11-10 NOTE — PROGRESS NOTES
Diabetes Self Management Training: Individual Review Visit    Holly Muir presents today for education and evaluation of glucose control related to Type 2 diabetes.    She is accompanied by her daughter, Emilia.     Patient's diabetes management related comments/concerns: Hyperglycemia.     Patient's emotional response to diabetes: expresses readiness to learn    Patient would like this visit to be focused around the following diabetes-related behaviors and goals: Taking Medication    ASSESSMENT:  Patient referred to [facility] by her Pcp, Dr King, for review of diabetes self mgmt education. Diagnosed with Type 2 Diabetes in . Pt recently [facility] from [facility] with daughterCata to [facility] with daughter, Emilia. Emilia is very interested in reviewing diabetes mgmt so she can help the paten with her care. The patient has struggled with  self mgmt due to mental health issues and poor diet.     Current Diabetes Management per Patient:  Taking diabetes medications? yes:  See below.    Diabetes Medication(s)     Biguanides       metFORMIN (GLUCOPHAGE) 1000 MG tablet    Take 1 tablet (1,000 mg) by mouth 2 times daily (with meals) For additional refills, please schedule a follow-up appointment, lab due    Insulin       insulin aspart (NOVOLOG FLEXPEN) 100 UNIT/ML pen    12 UNITS SUBCUTANEOUS PER MEAL, THREE TIMES A DAY.     insulin glargine (BASAGLAR KWIKPEN) 100 UNIT/ML pen    TAKE 56 UNITS SUBCUTANEOUSLY DAILY.    Insulin Sensitizing Agents       pioglitazone (ACTOS) 45 MG tablet    Take 1 tablet (45 mg) by mouth daily          Do you have any difficulty affording your medications or glucose monitoring supplies?     No    Patient glucose self monitoring as follows: Ave of 5 times a day.   Meter: Accu-Chek Ver[employer]ica Plus.    BG RESULTS: OF NOTE: time in BG meter was off by 4.5 hrs, later than real time. This was corrected after download.  Ave B. Highest readin. Lowest readin.   67% readings in target.  "32% above target. 1% below target.      Because time was off in meter, minimal insulin dose changes were made - a sliding scale was introduced.     BG values are: Not in goal  Patient's most recent   Lab Results   Component Value Date    A1C 10.5 10/08/2021    A1C 7.7 01/06/2021    is not meeting goal of <7.0    Nutrition:  Not discussed today.     Physical Activity:    Not discussed today.     Diabetes Risk Factors:  Age over 45 years, ethnicity, overweight/obesity, inactivity.     Diabetes Complications:  Not discussed today.    Recent health service and resource utilization related to diabetes (hyperglycemia, hypoglycemia, etc):   None    Vitals:  There were no vitals taken for this visit.  Estimated body mass index is 36.91 kg/m  as calculated from the following:    Height as of 9/16/21: 1.575 m (5' 2\").    Weight as of 9/16/21: 91.5 kg (201 lb 12.8 oz).   Last 3 BP:   BP Readings from Last 3 Encounters:   09/16/21 114/76   12/11/20 112/76   10/06/20 128/81       History   Smoking Status     Former Smoker     Packs/day: 0.00     Quit date: 8/9/2016   Smokeless Tobacco     Never Used       Labs:  Lab Results   Component Value Date    A1C 10.5 10/08/2021    A1C 7.7 01/06/2021     Lab Results   Component Value Date     10/08/2021     08/24/2020     Lab Results   Component Value Date    LDL 39 10/08/2021    LDL 26 06/18/2020     HDL Cholesterol   Date Value Ref Range Status   06/18/2020 78 >49 mg/dL Final     Direct Measure HDL   Date Value Ref Range Status   10/08/2021 76 >=50 mg/dL Final   ]  GFR Estimate   Date Value Ref Range Status   10/08/2021 87 >60 mL/min/1.73m2 Final     Comment:     As of July 11, 2021, eGFR is calculated by the CKD-EPI creatinine equation, without race adjustment. eGFR can be influenced by muscle mass, exercise, and diet. The reported eGFR is an estimation only and is only applicable if the renal function is stable.   08/24/2020 >90 >60 mL/min/[1.73_m2] Final     Comment:    " " Non  GFR Calc  Starting 12/18/2018, serum creatinine based estimated GFR (eGFR) will be   calculated using the Chronic Kidney Disease Epidemiology Collaboration   (CKD-EPI) equation.       GFR Estimate If Black   Date Value Ref Range Status   08/24/2020 >90 >60 mL/min/[1.73_m2] Final     Comment:      GFR Calc  Starting 12/18/2018, serum creatinine based estimated GFR (eGFR) will be   calculated using the Chronic Kidney Disease Epidemiology Collaboration   (CKD-EPI) equation.       Lab Results   Component Value Date    CR 0.73 10/08/2021    CR 0.68 08/24/2020       Socio/Economic/Cultural Considerations:    Support system: daughter.    Cultural Influences/Ethnic Background:  American      Health Literacy/Numeracy:  \"With diabetes, it's helpful to use forms and log books to write down blood sugars and what you're eating at times to help understand how foods affect your blood sugars. With this in mind how confident are you at filling out medical forms, such as these, by yourself?  Not Assessed    Health Beliefs and Attitudes:   Patient Activation Measure Survey Score:  RADHA Score (Last Two) 9/15/2011 1/5/2012   RADHA Raw Score 30 35   Activation Score 37.3 45.2   RADHA Level 1 1       Barriers to Learning; depression, forgetfulness.     Based on learning assessment above, most appropriate setting for further diabetes education would be: Individual setting.    INTERVENTION:   Education provided today on:  Taking Medication: Insulin doses reviewed. Sliding Scale introduced.     Opportunities for ongoing education and support in diabetes-self management were discussed.    Pt verbalized understanding of concepts discussed and recommendations provided today.       Education Materials Provided:  No new materials provided today    PLAN:  Taking Medication:   Basaglar: continue taking 56 units daily.     Novolog: Bg : take 12 units for a large meal and 6 units for a small meal.  If pre-meal " Bg < 80: take 8 units for a large meal and 4 units for a small meal.     Sliding Scale: add to meal dose if pre-meal Bg > 150: 1 unit per 50 points > 150  151-200: add 1 unit  201-250: add 2 units  251-300: add 3 units  301-350: add 4 units.     Snacks: like pie, cake, sweet treats: take 3 units Novolog.     FOLLOW-UP:  Dr King: 11/17  CDE: 12/01  Endo: 01/14    Time Spent: 45 minutes  Encounter Type: Individual    Any diabetes medication dose changes were made via the CDE Protocol and Collaborative Practice Agreement with the patient's primary care provider. A copy of this encounter was shared with the provider.    Holly Muir comes into clinic today at the request of Dr King, Ordering Provider for Pt Teaching and bg review.     This service provided today was under the supervising provider of the day Dr Lennon, who was available if needed.    Mikayla Sellers, RN, BSN, CDE   [facility]

## 2021-11-11 NOTE — PROGRESS NOTES
Outcome for 11/11/21 3:15 PM :Patient will download BG data during appointment.  Dakota Lu CMA  Adult Endocrinology  [facility]

## 2021-11-12 ENCOUNTER — OFFICE VISIT (OUTPATIENT)
Dept: ENDOCRINOLOGY | Facility: CLINIC | Age: 66
End: 2021-11-12
Payer: MEDICARE

## 2021-11-12 VITALS
BODY MASS INDEX: 37.86 KG/M2 | WEIGHT: 207 LBS | DIASTOLIC BLOOD PRESSURE: 81 MMHG | HEART RATE: 80 BPM | SYSTOLIC BLOOD PRESSURE: 133 MMHG | OXYGEN SATURATION: 100 %

## 2021-11-12 DIAGNOSIS — E88.819 INSULIN RESISTANCE: ICD-10-CM

## 2021-11-12 DIAGNOSIS — E11.65 TYPE 2 DIABETES MELLITUS WITH HYPERGLYCEMIA, WITH LONG-TERM CURRENT USE OF INSULIN (H): Primary | ICD-10-CM

## 2021-11-12 DIAGNOSIS — Z79.4 TYPE 2 DIABETES MELLITUS WITH HYPERGLYCEMIA, WITH LONG-TERM CURRENT USE OF INSULIN (H): Primary | ICD-10-CM

## 2021-11-12 DIAGNOSIS — Z91.148 POOR COMPLIANCE WITH MEDICATION: ICD-10-CM

## 2021-11-12 LAB — HBA1C MFR BLD: 8.9 % (ref 0–5.7)

## 2021-11-12 PROCEDURE — 99214 OFFICE O/P EST MOD 30 MIN: CPT | Performed by: INTERNAL MEDICINE

## 2021-11-12 PROCEDURE — 83036 HEMOGLOBIN GLYCOSYLATED A1C: CPT | Performed by: INTERNAL MEDICINE

## 2021-11-12 NOTE — LETTER
11/12/2021         RE: Holly Muir  [address] [place] [address]        Dear Colleague,    Thank you for referring your patient, Holly Muir, to the [facility]. Please see a copy of my visit note below.    Outcome for 11/11/21 3:15 PM :Patient will download BG data during appointment.  Dakota Lu [facility]  Adult Endocrinology  [facility]                                                            - Endocrinology Follow up -    Reason for visit/consult:  Uncontrolled type 2 diabetes mellitus with hyperglycemia, with long-term current use of insulin (H)    Primary care provider: Tiffany King      Assessment and Plan    # DM2, night time eating habits  A1c 8.9, improved from 10.5 over 1 month, by reviewing glucometer average glucose 156 past 1 month.  She mentioned occasionally lower side of glucose am,     - Reduce basaglar from 56 to 50 units daily  - Continue current Novolog     Novolog 6 or 12 units by meal (each meal, depends on frequency of meal )      -Continue current metformin 1000 mg bid  - Continue current Actos 45 mg, body weight stable.     - Emphasized to take time to take care of yourself and medicaitons    # Family situation  She needs to spend time frequently in [place] due to parents illuness    # DM education  Her daughter wants to know more about diabetic foods.   They will make appt with Mikayla.     # Mental health/Depression      # DM complication  Urine microalbumin due (ordered)    -RTC with me in 6 months       Elizabeth Medrano MD  Staff Physician  Endocrinology and Metabolism  License: [id number]      Current Diabetic Regimens:  Basaglar 50 units daily  NovoLog 6-12 units each meal  Metformin 1000 twice daily  Actos 45 mg daily    Life Style:  Wake up 10-11 am  Breakfast after  Lunch 1-2 pm: sandwich  Dinner 6 pm: mexican fat foods  Bedtime: 9 pm  3 am : wake up and snacks    Exercise:  walking    DM  complications:  Retinopathy: due not been for 1-2 years.   Nephropathy: urine microalbumin negative 8/2018 - due  Neuropathy:   Most recent LDL:   LDL Cholesterol Calculated   Date Value Ref Range Status   10/08/2021 39 <=100 mg/dL Final   06/18/2020 26 <100 mg/dL Final     Comment:     Desirable:       <100 mg/dl       Glucose Log: We downloaded her gluco meter and explained to the patient.   Average 156    Interval History as of 11/12/2021 : Patient has been doing better, back to compliant to medication . New event includes : A1c increased 10.5 while she was in [place] and not better .  Interval History as of 12/11/2020 : Patient has been doing well.A1c 7.9 today (12/11/2020), her glucose improved significantly, came with her daughter today and she is watching mother's diet.   Interval History as of 6/5/2020 : Fluctuating glucose, lots fast foods nowadays. Medication compliance good .  Interval History as of 9/20/2019 : Follow-up in 4 months, compliant medications.  However she still have nighttime snacking habit every night, when she eat carbohydrate snacks glucose in the morning was 250s.  Light fruits 160-180s.  Patient mentioned depression was worse spring 2019 but currently relatively okay and she has psychiatrist to follow-up.   Interval History as of 5/2019: Patient came by herself she missed appointment, increasing the dose of insulin, mentioned started to have a glucose increase 300 in the fasting in the morning her mental issue.  Seen by new psychiatrist. Visitign nurse once a week.    HPI: A 63 yo female second follow up DM2, she has remote history of colectomy. This is the third visit.   Used to use V go for several month, she was confused the system, worsened A1C, then we switch back to insulin injection regimens.    Currently lantus 38 utnis and novology 6-8-8 and sliding scale and metformin 1000 bid.   Last visit prscribed actos 30 mg daily, however she mentioned, she is not sure which one is  actos and she may not taking actos.      Of note,  she has had sleeping issues, which distracts her life significantly. She cannot sleep at night, usually sleeps in the day, during the night, she has been hungry and eating peanuts butters, jelly, fruits etc.   Sometimes she cannot sleep for 2 days.   She has visiting nurse once a week, yesterday the nurse called our clinic that her glucose was 500. Talked with Mikayla, lantus was increased from 38 to 42 units.     Glucose: she forgot to bring glucometer today.     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     Past Surgical History:   Procedure Laterality Date     ARTHROSCOPY KNEE RT/LT      LT     BLEPHAROPLASTY BILATERAL Bilateral 8/16/2019    Procedure: BILATERAL UPPER LID BLEPHAROPLASTY;  Surgeon: Randolph Cook MD;  Location:  OR     BREAST LUMPECTOMY, RT/LT      LT-benign      C EXCIS PTERYGIUM  10/08    [facility]     C STOMACH SURGERY PROCEDURE UNLISTED       CATARACT IOL, RT/LT       COLONOSCOPY  5/10/2012    Procedure:COLONOSCOPY; screening colonoscopy; Surgeon:MILLA VEGA; Location:[facility] OR     COLONOSCOPY WITH CO2 INSUFFLATION N/A 8/2/2019    Procedure: Colonoscopy with CO2 insufflation;  Surgeon: Himanshu Hi MD;  Location: [facility] OR     COLOSTOMY  2000     COLOSTOMY       HYSTERECTOMY, PAP NO LONGER INDICATED      done in [place]-cervical cancer     IMPLANT STIMULATOR SACRAL NERVE STAGE ONE Right 3/10/2015    Procedure: IMPLANT STIMULATOR SACRAL NERVE STAGE ONE;  Surgeon: Teodora Yusuf MD;  [facility]: [facility] OR     IMPLANT STIMULATOR  SACRAL NERVE STAGE TWO Right 3/31/2015    Procedure: IMPLANT STIMULATOR SACRAL NERVE STAGE TWO;  Surgeon: Teodora Yusuf MD;  [facility]: [facility] OR     IMPLANT STIMULATOR SACRAL NERVE STAGE TWO Right 6/22/2020    Procedure: INSERTION, SACRAL NERVE STIMULATOR, STAGE 2 (replacement of interstim battery);  Surgeon: Teodora Yusuf MD;  Location: [facility] OR     PHACOEMULSIFICATION WITH STANDARD INTRAOCULAR LENS IMPLANT Left 9/26/2019    Procedure: LEFT PHACOEMULSIFICATION, CATARACT, WITH STANDARD IOL INSERTION;  Surgeon: Francesco Winn MD;  Location: [facility] OR     PHACOEMULSIFICATION WITH STANDARD INTRAOCULAR LENS IMPLANT Right 10/24/2019    Procedure: RIGHT PHACOEMULSIFICATION, CATARACT, WITH STANDARD IOL INSERTION;  Surgeon: Francesco Winn MD;  Location: [facility] OR     REPAIR PTOSIS Bilateral 08/16/2019     SALPINGO OOPHORECTOMY,R/L/JENNIFER      Salpingo Oophorectomy, RT/LT/JENNIFER     ZZHC COLONOSCOPY THRU STOMA, DIAGNOSTIC  2002    normal per report-no records available-records destroyed       Allergies:  Allergies   Allergen Reactions     Morphine Sulfate Itching       Current Medications   Current Outpatient Medications   Medication     ARIPiprazole (ABILIFY) 30 MG tablet     ARIPiprazole (ABILIFY) 5 MG tablet     aspirin ([place] LOW DOSE) 81 MG chewable tablet     blood glucose (ACCU-CHEK CELIA) test strip     blood glucose monitoring (ACCU-CHEK FASTCLIX) lancets     buPROPion (WELLBUTRIN XL) 300 MG 24 hr tablet     busPIRone HCl (BUSPAR) 30 MG tablet     cetirizine (ZYRTEC) 10 MG tablet     [employer] PAIN RELIEF 500 MG tablet     ferrous gluconate (FERGON) 324 (38 Fe) MG tablet     gabapentin (NEURONTIN) 600 MG tablet     ibuprofen (ADVIL/MOTRIN) 400 MG tablet     insulin aspart (NOVOLOG FLEXPEN) 100 UNIT/ML pen     insulin glargine (BASAGLAR KWIKPEN) 100 UNIT/ML pen     insulin pen needle (31G X 5 MM) 31G X 5 MM miscellaneous     metFORMIN (GLUCOPHAGE) 1000 MG tablet     Multiple Vitamin (DAILY-ISAAC) TABS     nystatin  (MYCOSTATIN) 428231 UNIT/GM external powder     omeprazole (PRILOSEC) 20 MG DR capsule     order for DME     order for DME     order for DME     order for DME     ORDER FOR DME     ORDER FOR DME     PARoxetine (PAXIL) 20 MG tablet     pioglitazone (ACTOS) 45 MG tablet     propranolol ER (INDERAL LA) 80 MG 24 hr capsule     ramipril (ALTACE) 5 MG capsule     rOPINIRole (REQUIP) 0.25 MG tablet     simvastatin (ZOCOR) 20 MG tablet     traMADol (ULTRAM) 50 MG tablet     traZODone (DESYREL) 50 MG tablet     UNIVERSAL REMOVER WIPES EX MISC     vitamin C ([employer] VITAMIN C) 500 MG tablet     vitamin D3 (CHOLECALCIFEROL) 50 mcg (2000 units) tablet     Continuous Blood Gluc  (FREESTYLE CM 14 DAY READER) GALINA     Continuous Blood Gluc Sensor (FREESTYLE CM 14 DAY SENSOR) [facility]     Current Facility-Administered Medications   Medication     triamcinolone (KENALOG-40) injection 40 mg     triamcinolone (KENALOG-40) injection 40 mg     triamcinolone (KENALOG-40) injection 40 mg     triamcinolone (KENALOG-40) injection 40 mg     triamcinolone (KENALOG-40) injection 40 mg     triamcinolone (KENALOG-40) injection 40 mg     triamcinolone (KENALOG-40) injection 40 mg     triamcinolone (KENALOG-40) injection 40 mg     triamcinolone (KENALOG-40) injection 40 mg     triamcinolone (KENALOG-40) injection 40 mg     triamcinolone (KENALOG-40) injection 40 mg       Family History:  Family History   Problem Relation Age of Onset     Diabetes Mother      Cerebrovascular Disease Mother      Diabetes Father      Hypertension Father      Cancer Maternal Grandfather      Cancer Paternal Grandfather      Diabetes Brother      Diabetes Sister      Thyroid Disease Daughter      Thyroid Disease Sister      Multiple Sclerosis Daughter      Glaucoma No family hx of      Macular Degeneration No family hx of        Social History:  Social History     Tobacco Use     Smoking status: Former Smoker     Packs/day: 0.00     Quit date: 8/9/2016     Years  since quittin.2     Smokeless tobacco: Never Used   Substance Use Topics     Alcohol use: Yes     Alcohol/week: 0.0 standard drinks     Comment: social occasions   taking care [facility] 11 and 18 year grand [facility]     ROS:  Full review of systems taken with the help of the intake sheet. Otherwise a complete 14 point review of systems was taken and is negative unless stated in the history above.    Physical Exam:   Blood pressure 133/81, pulse 80, weight 93.9 kg (207 lb), SpO2 100 %, not currently breastfeeding.  General: well appearing, no acute distress, pleasant and conversant,   Mental Status/neuro: alert and oriented  Face: symmetrical, normal facial color  Eyes: anicteric, no proptosis or lid lag  Resp: no acute distress        Labs (General):      Ref. Range 2020 14:23 2020 00:00 2021 09:32 10/8/2021 09:22 2021 00:00   Hemoglobin A1C Latest Ref Range: 0.0 - 5.7 % 10.6 (H) 7.9 (A) 7.7 (H) 10.5 (H) 8.9 (A)             Again, thank you for allowing me to participate in the care of your patient.        Sincerely,        Elizabeth Medrano MD

## 2021-11-12 NOTE — NURSING NOTE
Holly Muir's goals for this visit include:   Chief Complaint   Patient presents with     Diabetes     She requests these members of her care team be copied on today's visit information: Yes    PCP: Tiffany King    Referring Provider:  Tiffany King    /81 (BP Location: Left arm, Patient Position: Sitting, Cuff Size: Adult Regular)   Pulse 80   Wt 93.9 kg (207 lb)   SpO2 100%   BMI 37.86 kg/m      Do you need any medication refills at today's visit? Yes    Dakota Lu [facility]  Adult Endocrinology  [facility]

## 2021-11-12 NOTE — PROGRESS NOTES
- Endocrinology Follow up -    Reason for visit/consult:  Uncontrolled type 2 diabetes mellitus with hyperglycemia, with long-term current use of insulin (H)    Primary care provider: Tiffany King      Assessment and Plan    # DM2, night time eating habits  A1c 8.9, improved from 10.5 over 1 month, by reviewing glucometer average glucose 156 past 1 month.  She mentioned occasionally lower side of glucose am,     - Reduce basaglar from 56 to 50 units daily  - Continue current Novolog     Novolog 6 or 12 units by meal (each meal, depends on frequency of meal )      -Continue current metformin 1000 mg bid  - Continue current Actos 45 mg, body weight stable.     - Emphasized to take time to take care of yourself and medicaitons    # Family situation  She needs to spend time frequently in [place] due to parents illuness    # DM education  Her daughter wants to know more about diabetic foods.   They will make appt with Mikayla.     # Mental health/Depression      # DM complication  Urine microalbumin due (ordered)    -RTC with me in 6 months       Elizabeth Medrano MD  Staff Physician  Endocrinology and Metabolism  License: [id number]      Current Diabetic Regimens:  Basaglar 50 units daily  NovoLog 6-12 units each meal  Metformin 1000 twice daily  Actos 45 mg daily    Life Style:  Wake up 10-11 am  Breakfast after  Lunch 1-2 pm: sandwich  Dinner 6 pm: mexican fat foods  Bedtime: 9 pm  3 am : wake up and snacks    Exercise:  walking    DM complications:  Retinopathy: due not been for 1-2 years.   Nephropathy: urine microalbumin negative 8/2018 - due  Neuropathy:   Most recent LDL:   LDL Cholesterol Calculated   Date Value Ref Range Status   10/08/2021 39 <=100 mg/dL Final   06/18/2020 26 <100 mg/dL Final     Comment:     Desirable:       <100 mg/dl       Glucose Log: We downloaded her gluco meter and explained to the patient.   Average 156    Interval History as of 11/12/2021 :  Patient has been doing better, back to compliant to medication . New event includes : A1c increased 10.5 while she was in [place] and not better .  Interval History as of 12/11/2020 : Patient has been doing well.A1c 7.9 today (12/11/2020), her glucose improved significantly, came with her daughter today and she is watching mother's diet.   Interval History as of 6/5/2020 : Fluctuating glucose, lots fast foods nowadays. Medication compliance good .  Interval History as of 9/20/2019 : Follow-up in 4 months, compliant medications.  However she still have nighttime snacking habit every night, when she eat carbohydrate snacks glucose in the morning was 250s.  Light fruits 160-180s.  Patient mentioned depression was worse spring 2019 but currently relatively okay and she has psychiatrist to follow-up.   Interval History as of 5/2019: Patient came by herself she missed appointment, increasing the dose of insulin, mentioned started to have a glucose increase 300 in the fasting in the morning her mental issue.  Seen by new psychiatrist. Visitign nurse once a week.    HPI: A 61 yo female second follow up DM2, she has remote history of colectomy. This is the third visit.   Used to use V go for several month, she was confused the system, worsened A1C, then we switch back to insulin injection regimens.    Currently lantus 38 utnis and novology 6-8-8 and sliding scale and metformin 1000 bid.   Last visit prscribed actos 30 mg daily, however she mentioned, she is not sure which one is actos and she may not taking actos.      Of note,  she has had sleeping issues, which distracts her life significantly. She cannot sleep at night, usually sleeps in the day, during the night, she has been hungry and eating peanuts butters, jelly, fruits etc.   Sometimes she cannot sleep for 2 days.   She has visiting nurse once a week, yesterday the nurse called our clinic that her glucose was 500. Talked with Mikayla, lantus was increased from 38  to 42 units.     Glucose: she forgot to bring glucometer today.     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     Past Surgical History:   Procedure Laterality Date     ARTHROSCOPY KNEE RT/LT      LT     BLEPHAROPLASTY BILATERAL Bilateral 8/16/2019    Procedure: BILATERAL UPPER LID BLEPHAROPLASTY;  Surgeon: Randolph Cook MD;  Location: [facility]     BREAST LUMPECTOMY, RT/LT      LT-benign      C EXCIS PTERYGIUM  10/08    [facility]     C STOMACH SURGERY PROCEDURE UNLISTED       CATARACT IOL, RT/LT       COLONOSCOPY  5/10/2012    Procedure:COLONOSCOPY; screening colonoscopy; Surgeon:MILLA VEGA; Location:[facility]     COLONOSCOPY WITH CO2 INSUFFLATION N/A 8/2/2019    Procedure: Colonoscopy with CO2 insufflation;  Surgeon: Himanshu Hi MD;  Location: [facility]     COLOSTOMY  2000     COLOSTOMY       HYSTERECTOMY, PAP NO LONGER INDICATED      done in [place]-cervical cancer     IMPLANT STIMULATOR SACRAL NERVE STAGE ONE Right 3/10/2015    Procedure: IMPLANT STIMULATOR SACRAL NERVE STAGE ONE;  Surgeon: Teodora Yusuf MD;  [facility]     IMPLANT STIMULATOR SACRAL NERVE STAGE TWO Right 3/31/2015    Procedure: IMPLANT STIMULATOR SACRAL NERVE STAGE TWO;  Surgeon: Teodroa Yusuf MD;  [facility]     IMPLANT STIMULATOR SACRAL NERVE STAGE TWO Right 6/22/2020    Procedure: INSERTION, SACRAL NERVE STIMULATOR, STAGE 2 (replacement of interstim battery);  Surgeon: Teodora Yusuf MD;  Location: [facility]     PHACOEMULSIFICATION WITH STANDARD INTRAOCULAR LENS IMPLANT Left 9/26/2019    Procedure: LEFT  PHACOEMULSIFICATION, CATARACT, WITH STANDARD IOL INSERTION;  Surgeon: Francesco Winn MD;  Location: [facility]     PHACOEMULSIFICATION WITH STANDARD INTRAOCULAR LENS IMPLANT Right 10/24/2019    Procedure: RIGHT PHACOEMULSIFICATION, CATARACT, WITH STANDARD IOL INSERTION;  Surgeon: Francesco Winn MD;  Location: [facility]     REPAIR PTOSIS Bilateral 08/16/2019     SALPINGO OOPHORECTOMY,R/L/JENNIFER      Salpingo Oophorectomy, RT/LT/JENNIFER     [facility] COLONOSCOPY THRU STOMA, DIAGNOSTIC  2002    normal per report-no records available-records destroyed       Allergies:  Allergies   Allergen Reactions     Morphine Sulfate Itching       Current Medications   Current Outpatient Medications   Medication     ARIPiprazole (ABILIFY) 30 MG tablet     ARIPiprazole (ABILIFY) 5 MG tablet     aspirin ([place] LOW DOSE) 81 MG chewable tablet     blood glucose (ACCU-CHEK CELIA) test strip     blood glucose monitoring (ACCU-CHEK FASTCLIX) lancets     buPROPion (WELLBUTRIN XL) 300 MG 24 hr tablet     busPIRone HCl (BUSPAR) 30 MG tablet     cetirizine (ZYRTEC) 10 MG tablet     [employer] PAIN RELIEF 500 MG tablet     ferrous gluconate (FERGON) 324 (38 Fe) MG tablet     gabapentin (NEURONTIN) 600 MG tablet     ibuprofen (ADVIL/MOTRIN) 400 MG tablet     insulin aspart (NOVOLOG FLEXPEN) 100 UNIT/ML pen     insulin glargine (BASAGLAR KWIKPEN) 100 UNIT/ML pen     insulin pen needle (31G X 5 MM) 31G X 5 MM miscellaneous     metFORMIN (GLUCOPHAGE) 1000 MG tablet     Multiple Vitamin (DAILY-ISAAC) TABS     nystatin (MYCOSTATIN) 114047 UNIT/GM external powder     omeprazole (PRILOSEC) 20 MG DR capsule     order for DME     order for DME     order for DME     order for DME     ORDER FOR DME     ORDER FOR DME     PARoxetine (PAXIL) 20 MG tablet     pioglitazone (ACTOS) 45 MG tablet     propranolol ER (INDERAL LA) 80 MG 24 hr capsule     ramipril (ALTACE) 5 MG capsule     rOPINIRole (REQUIP) 0.25 MG tablet     simvastatin (ZOCOR) 20 MG tablet     traMADol  (ULTRAM) 50 MG tablet     traZODone (DESYREL) 50 MG tablet     UNIVERSAL REMOVER WIPES EX MISC     vitamin C ([employer] VITAMIN C) 500 MG tablet     vitamin D3 (CHOLECALCIFEROL) 50 mcg (2000 units) tablet     Continuous Blood Gluc  (FREESTYLE CM 14 DAY READER) GALINA     Continuous Blood Gluc Sensor (FREESTYLE CM 14 DAY SENSOR) MISC     Current Facility-Administered Medications   Medication     triamcinolone (KENALOG-40) injection 40 mg     triamcinolone (KENALOG-40) injection 40 mg     triamcinolone (KENALOG-40) injection 40 mg     triamcinolone (KENALOG-40) injection 40 mg     triamcinolone (KENALOG-40) injection 40 mg     triamcinolone (KENALOG-40) injection 40 mg     triamcinolone (KENALOG-40) injection 40 mg     triamcinolone (KENALOG-40) injection 40 mg     triamcinolone (KENALOG-40) injection 40 mg     triamcinolone (KENALOG-40) injection 40 mg     triamcinolone (KENALOG-40) injection 40 mg       Family History:  Family History   Problem Relation Age of Onset     Diabetes Mother      Cerebrovascular Disease Mother      Diabetes Father      Hypertension Father      Cancer Maternal Grandfather      Cancer Paternal Grandfather      Diabetes Brother      Diabetes Sister      Thyroid Disease Daughter      Thyroid Disease Sister      Multiple Sclerosis Daughter      Glaucoma No family hx of      Macular Degeneration No family hx of        Social History:  Social History     Tobacco Use     Smoking status: Former Smoker     Packs/day: 0.00     Quit date: 2016     Years since quittin.2     Smokeless tobacco: Never Used   Substance Use Topics     Alcohol use: Yes     Alcohol/week: 0.0 standard drinks     Comment: social occasions   taking care of 11 and 18 year grand children and live [facility].     ROS:  Full review of systems taken with the help of the intake sheet. Otherwise a complete 14 point review of systems was taken and is negative unless stated in the history above.    Physical Exam:   Blood  pressure 133/81, pulse 80, weight 93.9 kg (207 lb), SpO2 100 %, not currently breastfeeding.  General: well appearing, no acute distress, pleasant and conversant,   Mental Status/neuro: alert and oriented  Face: symmetrical, normal facial color  Eyes: anicteric, no proptosis or lid lag  Resp: no acute distress        Labs (General):      Ref. Range 8/24/2020 14:23 12/11/2020 00:00 1/6/2021 09:32 10/8/2021 09:22 11/12/2021 00:00   Hemoglobin A1C Latest Ref Range: 0.0 - 5.7 % 10.6 (H) 7.9 (A) 7.7 (H) 10.5 (H) 8.9 (A)

## 2021-11-17 ENCOUNTER — VIRTUAL VISIT (OUTPATIENT)
Dept: FAMILY MEDICINE | Facility: CLINIC | Age: 66
End: 2021-11-17
Payer: MEDICARE

## 2021-11-17 DIAGNOSIS — G89.4 CHRONIC PAIN SYNDROME: ICD-10-CM

## 2021-11-17 DIAGNOSIS — N18.31 CHRONIC KIDNEY DISEASE, STAGE 3A (H): ICD-10-CM

## 2021-11-17 DIAGNOSIS — E87.1 HYPONATREMIA: Primary | ICD-10-CM

## 2021-11-17 PROCEDURE — 99213 OFFICE O/P EST LOW 20 MIN: CPT | Mod: 95 | Performed by: INTERNAL MEDICINE

## 2021-11-17 NOTE — PROGRESS NOTES
Holly is a 66 year old who is being evaluated via a billable telephone visit.      What phone number would you like to be contacted at? [phone]   How would you like to obtain your AVS? [facility]    Assessment & Plan     Diagnoses and all orders for this visit:    Hyponatremia    Chronic kidney disease, stage 3a (H)    Chronic pain syndrome       Pt will get sodium test done at [facility] before she leaves on Saturday.     Return in about 3 months (around 2/17/2022) for Pain management, blood pressure, vacccines update. .    Tiffany King MD PhD  [facility]   Holly is a 66 year old who presents for the following health issues     Patient is worried over his dad who is slowly dying out in [place]. Pt plans to go out to see him this Saturday.     She is cutting  Down on fluid intake. 1-2 sodas, 1 jug of water a day.   Sugar much improved. Saw endocrine last week. A1c down to 8.9 from 10.5.     She is due to recheck sodium after cutting down on fluids.     Chronic pain syndrome on tramadol. Doing okay with this.          Review of Systems   Constitutional, HEENT, cardiovascular, pulmonary, gi and gu systems are negative, except as otherwise noted.      Objective           Vitals:  No vitals were obtained today due to virtual visit.    Physical Exam   healthy, alert and no distress  PSYCH: Alert and oriented times 3; coherent speech, normal   rate and volume, able to articulate logical thoughts, able   to abstract reason, no tangential thoughts, no hallucinations   or delusions  Her affect is normal  RESP: No cough, no audible wheezing, able to talk in full sentences  Remainder of exam unable to be completed due to telephone visits      Phone call duration: 6 minutes

## 2021-11-19 ENCOUNTER — TRANSFERRED RECORDS (OUTPATIENT)
Dept: HEALTH INFORMATION MANAGEMENT | Facility: CLINIC | Age: 66
End: 2021-11-19
Payer: MEDICARE

## 2021-11-20 DIAGNOSIS — F32.3 SEVERE MAJOR DEPRESSION WITH PSYCHOTIC FEATURES (H): ICD-10-CM

## 2021-11-22 DIAGNOSIS — F33.3 SEVERE RECURRENT MAJOR DEPRESSIVE DISORDER WITH PSYCHOTIC FEATURES (H): ICD-10-CM

## 2021-11-22 RX ORDER — MULTIVITAMIN WITH FOLIC ACID 400 MCG
TABLET ORAL
Qty: 90 TABLET | Refills: 2 | Status: SHIPPED | OUTPATIENT
Start: 2021-11-22 | End: 2022-08-22

## 2021-11-22 RX ORDER — CHOLECALCIFEROL (VITAMIN D3) 50 MCG
TABLET ORAL
Qty: 90 TABLET | Refills: 3 | Status: SHIPPED | OUTPATIENT
Start: 2021-11-22 | End: 2023-01-27

## 2021-11-22 NOTE — TELEPHONE ENCOUNTER
Prescription approved per [facility] Refill Protocol.  Nurys Yoder RN, BSN   [facility]

## 2021-11-24 ENCOUNTER — TELEPHONE (OUTPATIENT)
Dept: FAMILY MEDICINE | Facility: CLINIC | Age: 66
End: 2021-11-24
Payer: MEDICARE

## 2021-11-24 DIAGNOSIS — I10 HTN, GOAL BELOW 140/90: ICD-10-CM

## 2021-11-24 DIAGNOSIS — Z43.3 COLOSTOMY, EVALUATE (H): ICD-10-CM

## 2021-11-24 DIAGNOSIS — R41.3 MEMORY LOSS: ICD-10-CM

## 2021-11-24 DIAGNOSIS — F32.3 SEVERE MAJOR DEPRESSION WITH PSYCHOTIC FEATURES (H): ICD-10-CM

## 2021-11-24 DIAGNOSIS — Z93.3 S/P COLOSTOMY (H): ICD-10-CM

## 2021-11-24 DIAGNOSIS — E78.5 HYPERLIPIDEMIA LDL GOAL <100: ICD-10-CM

## 2021-11-24 DIAGNOSIS — G25.81 RESTLESS LEG SYNDROME: ICD-10-CM

## 2021-11-24 DIAGNOSIS — E66.01 MORBID OBESITY (H): ICD-10-CM

## 2021-11-24 DIAGNOSIS — E16.0 HYPOGLYCEMIA DUE TO INSULIN: ICD-10-CM

## 2021-11-24 DIAGNOSIS — R25.1 TREMOR: ICD-10-CM

## 2021-11-24 DIAGNOSIS — F42.9 OBSESSIVE-COMPULSIVE DISORDER, UNSPECIFIED TYPE: ICD-10-CM

## 2021-11-24 DIAGNOSIS — G89.4 CHRONIC PAIN SYNDROME: ICD-10-CM

## 2021-11-24 DIAGNOSIS — Z85.41 HISTORY OF CERVICAL CANCER: ICD-10-CM

## 2021-11-24 DIAGNOSIS — M46.96 UNSPECIFIED INFLAMMATORY SPONDYLOPATHY, LUMBAR REGION (H): ICD-10-CM

## 2021-11-24 DIAGNOSIS — N18.31 CHRONIC KIDNEY DISEASE, STAGE 3A (H): ICD-10-CM

## 2021-11-24 DIAGNOSIS — D63.8 ANEMIA, CHRONIC DISEASE: ICD-10-CM

## 2021-11-24 DIAGNOSIS — Z79.4 ENCOUNTER FOR LONG-TERM (CURRENT) USE OF INSULIN (H): ICD-10-CM

## 2021-11-24 DIAGNOSIS — K21.9 GASTROESOPHAGEAL REFLUX DISEASE WITHOUT ESOPHAGITIS: ICD-10-CM

## 2021-11-24 DIAGNOSIS — E11.649 UNCONTROLLED TYPE 2 DIABETES MELLITUS WITH HYPOGLYCEMIA WITHOUT COMA (H): Primary | ICD-10-CM

## 2021-11-24 DIAGNOSIS — T38.3X5A HYPOGLYCEMIA DUE TO INSULIN: ICD-10-CM

## 2021-11-24 DIAGNOSIS — Z90.49 S/P PARTIAL COLECTOMY: ICD-10-CM

## 2021-11-24 DIAGNOSIS — G89.29 CHRONIC BILATERAL LOW BACK PAIN WITHOUT SCIATICA: ICD-10-CM

## 2021-11-24 DIAGNOSIS — R33.9 URINARY RETENTION: ICD-10-CM

## 2021-11-24 DIAGNOSIS — F25.9 SCHIZOAFFECTIVE DISORDER, UNSPECIFIED TYPE (H): ICD-10-CM

## 2021-11-24 DIAGNOSIS — F41.9 ANXIETY: ICD-10-CM

## 2021-11-24 DIAGNOSIS — E11.42 DIABETIC POLYNEUROPATHY ASSOCIATED WITH TYPE 2 DIABETES MELLITUS (H): ICD-10-CM

## 2021-11-24 DIAGNOSIS — R15.9 FECAL INCONTINENCE DUE TO ANORECTAL DISORDER: ICD-10-CM

## 2021-11-24 DIAGNOSIS — R44.1 VISUAL HALLUCINATION: ICD-10-CM

## 2021-11-24 DIAGNOSIS — K62.9 FECAL INCONTINENCE DUE TO ANORECTAL DISORDER: ICD-10-CM

## 2021-11-24 DIAGNOSIS — M54.50 CHRONIC BILATERAL LOW BACK PAIN WITHOUT SCIATICA: ICD-10-CM

## 2021-11-24 NOTE — LETTER
November 24, 2021      Holly Muir  [address] [id number]              To Whom It May Concern,    Enclosed are the pertinent diagnosis with ICD codes. This is not an exhaustive list.     Diagnoses       Codes Comments    Uncontrolled type 2 diabetes mellitus with hypoglycemia without coma (H)    -  Primary E11.649     Severe major depression with psychotic features (H)     F32.3     S/P colostomy (H)     Z93.3     Memory loss     R41.3     Hypoglycemia due to insulin     E16.0, T38.3X5A     Hyperlipidemia LDL goal <100     E78.5     HTN, goal below 140/90     I10     History of cervical cancer     Z85.41     Encounter for long-term (current) use of insulin (H)     Z79.4     Diabetic polyneuropathy associated with type 2 diabetes mellitus (H)     E11.42     Colostomy, evaluate (H)     Z43.3     Chronic pain syndrome     G89.4     Visual hallucination     R44.1     Urinary retention     R33.9     Unspecified inflammatory spondylopathy, lumbar region (H)     M46.96     Tremor     R25.1     Schizoaffective disorder, unspecified type (H)     F25.9     S/P partial colectomy     Z90.49     Restless leg syndrome     G25.81     Obsessive-compulsive disorder, unspecified type     F42.9     Morbid obesity (H)     E66.01     Gastroesophageal reflux disease without esophagitis     K21.9     Chronic kidney disease, stage 3a (H)     N18.31     Chronic bilateral low back pain without sciatica     M54.50, G89.29     Anemia, chronic disease     D63.8     Anxiety     F41.9     Fecal incontinence due to anorectal disorder     K62.9, R15.9           Should you have any questions, please feel free to contact me at the address above.            Sincerely,        Tiffany King MD PhD

## 2021-12-01 DIAGNOSIS — M25.561 ARTHRALGIA OF BOTH KNEES: ICD-10-CM

## 2021-12-01 DIAGNOSIS — M25.562 ARTHRALGIA OF BOTH KNEES: ICD-10-CM

## 2021-12-01 RX ORDER — ACETAMINOPHEN 500 MG/1
TABLET ORAL
Qty: 180 TABLET | Refills: 1 | Status: SHIPPED | OUTPATIENT
Start: 2021-12-01 | End: 2022-04-22

## 2021-12-13 ENCOUNTER — PATIENT OUTREACH (OUTPATIENT)
Dept: GERIATRIC MEDICINE | Facility: CLINIC | Age: 66
End: 2021-12-13
Payer: MEDICARE

## 2021-12-13 DIAGNOSIS — Z93.3 S/P COLOSTOMY (H): Primary | ICD-10-CM

## 2021-12-13 NOTE — PROGRESS NOTES
[facility] Care Coordination Contact    Member called stating that her previous DME company no longer works with her Insurance plan and she is in need of Colostomy supplies. CC will request orders and sent them to [facility].   Divya Smalls RN,BSN  [facility] Case Coordinator   [phone]

## 2021-12-15 ENCOUNTER — TELEPHONE (OUTPATIENT)
Dept: FAMILY MEDICINE | Facility: CLINIC | Age: 66
End: 2021-12-15
Payer: MEDICARE

## 2021-12-15 ENCOUNTER — TELEPHONE (OUTPATIENT)
Dept: FAMILY MEDICINE | Facility: CLINIC | Age: 66
End: 2021-12-15

## 2021-12-15 NOTE — PROGRESS NOTES
12/15/2021   CC spoke with [facility] and they do have the products.   They will need orders faxed to [facility]   T: [phone]  Fax# [phone]   I will be sending a face sheet and most recent MD visit. The member is in needs of supplies in the next few days. CC called and gave the member the needed info and she will call her PCP as well. Divya Smalls RN,BSN  [facility] Case Coordinator   [phone]

## 2021-12-15 NOTE — TELEPHONE ENCOUNTER
Patient states that she is needing an order for her colostomy bags and elastic barrier strips xlarge.    [facility]-[phone]  Fax [phone]

## 2021-12-23 ENCOUNTER — TRANSFERRED RECORDS (OUTPATIENT)
Dept: HEALTH INFORMATION MANAGEMENT | Facility: CLINIC | Age: 66
End: 2021-12-23
Payer: MEDICARE

## 2022-02-17 ENCOUNTER — PATIENT OUTREACH (OUTPATIENT)
Dept: GERIATRIC MEDICINE | Facility: CLINIC | Age: 67
End: 2022-02-17
Payer: COMMERCIAL

## 2022-02-17 ENCOUNTER — TELEPHONE (OUTPATIENT)
Dept: FAMILY MEDICINE | Facility: CLINIC | Age: 67
End: 2022-02-17
Payer: COMMERCIAL

## 2022-02-17 DIAGNOSIS — Z93.3 S/P COLOSTOMY (H): Primary | ICD-10-CM

## 2022-02-17 NOTE — PROGRESS NOTES
[facility] Care Coordination Contact    Tc from member requesting assistance with obtaining colostomy supplies. The member previous company [employer] use to provide #20 Flanges and #40 bags. [facility] is unable to provide this per Medicare guidelines. CC spoke with the Cadi waiver worker Agnieszka and an extra 70 bags would be over 1,000.00. The member is stating she is changing her bags sometimes 2-3 times a day. The member has not been seen by GI in some time and was seen in 2020 by the Wound Ostomy Nurse with [facility]. The member explained that she is having loose stools and they soil the flange and it no longer sticks. CC feels the member needs to be seen to see if a different appliance would work better or if she needs a DANIAL for increased bags. CC is concerned about the skin health around the stoma with such freq bag changes. CC communicated with Ruben CAMPOS in [facility] and he is able to see member's on an outpatient basis.     Dr. King, Please enter a referral . See below. Thanks!  Yes I do see pt's in clinic with ostomies.  to schedule the pt just needs a referral to the Wound and Ostomy clinic here at [facility], entered into [employer] by a provider.    Divya Smalls RN,BSN  [facility] Case Coordinator   [phone]

## 2022-02-17 NOTE — TELEPHONE ENCOUNTER
Patient calling to say that her insurance is not coving the DME order for the:  Valarie colostomy bags 1 piece  # 5260.  change 2-3 times per day as needed #93 per month  Patient states that that they will only cover 20 bags per month and her Care Coordinator has been trying a Cadi waiver to cover the rest and unable to get it covered.  Patient states that she is having Diarrhea and has been going through 3-4 bags a day. She was given samples and those bags keep breaking.   Her Care Coordinator states that she needs to also request a referral order for a Colostomy nurse to check for breakdown of her skin. Please advise.  [facility]  2nd Floor  Primary Care

## 2022-02-17 NOTE — TELEPHONE ENCOUNTER
Let pt know I put in a referral for her to wound care.    I am not sure what I can do about her insurance not covering her supplies. Maybe wound care nurse can evaluate and there may be alternative brand/supply?

## 2022-02-17 NOTE — TELEPHONE ENCOUNTER
"Called patient to discuss message from Dr King.    The supply bags that the patient gets #8534, do work and are covered by insurance, however, it exceeds the amount covered.      Patient states lately, she has been having increased loose stools, she goes through more bags than she is allotted per insurance.  This is not always the case, but it has been recently.    Patient's diarrhea stools make the bag pull away from the skin, so she has to take it off and replace all together.  They are bags that can empty after a stool, however they are leaking.    Patient is down to 3 bags left, she is \"desperate and getting depressed\" over this.  She has pin point red bumps on her stoma. When cleansing and wiping, they are tender and can bleed.     Imodium only causes constipation and abdominal pain.    Called the [employer] supply [employer], [phone].   They received the request from the patient to have more bags shipped to her, however, they take 5-6 business days to get ordered and shipped.    Called patient back to remind to call every 21 days per [employer].   Patient agrees to plan.    Routing to provider to advise further.    Liz Boogie RN, [facility]     "

## 2022-02-17 NOTE — TELEPHONE ENCOUNTER
Left message for patient to return call, if she does, please give her message below from Dr. Sanchez

## 2022-02-17 NOTE — TELEPHONE ENCOUNTER
Let pt know I made a referral to colostomy consult.   I'm not sure what I can do about her colostomy supply if her insurance doesn't cover. Maybe colotomy nurse consult will be able to get insurance to cover something different.

## 2022-02-18 ENCOUNTER — TELEPHONE (OUTPATIENT)
Dept: WOUND CARE | Facility: CLINIC | Age: 67
End: 2022-02-18
Payer: COMMERCIAL

## 2022-02-18 NOTE — TELEPHONE ENCOUNTER
Best course of action is for home  Care wound/stroma nurse to visit and evaluate and make recommendations. Referral was made see encounter today [facility] Care Coordination.

## 2022-02-18 NOTE — TELEPHONE ENCOUNTER
Reason for Call:  Other call back    Detailed comments: clinic calling on behalf of pt- scheduled appt for 2/23 but wondering if she can get bags before then- pt insurance will not cover- Please call pt to discuss further    Phone Number Patient can be reached at: Home number on file [phone] (home)    Best Time: any    Can we leave a detailed message on this number? YES    Call taken on 2/18/2022 at 8:54 AM by Mary Morris

## 2022-02-18 NOTE — TELEPHONE ENCOUNTER
Returned the pt's call and spoke with her and her daughter.  I do not stock the exact same [place] appliance the pt normally uses, but I have a similar appliance in a two piece set up that will work.  I left these supplies for the pt to be picked up at [facility].  Name is on the bag for her daughter to  late afternoon today   Pt is scheduled to see me next week Wednesday 2/23/22 in the Wound and Ostomy clinic for an initial eval and treate visit.  Ruben Marin Rn cwocn

## 2022-02-20 DIAGNOSIS — J30.2 SEASONAL ALLERGIC RHINITIS, UNSPECIFIED TRIGGER: ICD-10-CM

## 2022-02-21 ENCOUNTER — PATIENT OUTREACH (OUTPATIENT)
Dept: GERIATRIC MEDICINE | Facility: CLINIC | Age: 67
End: 2022-02-21
Payer: COMMERCIAL

## 2022-02-21 ENCOUNTER — TELEPHONE (OUTPATIENT)
Dept: FAMILY MEDICINE | Facility: CLINIC | Age: 67
End: 2022-02-21
Payer: COMMERCIAL

## 2022-02-21 DIAGNOSIS — Z93.3 S/P COLOSTOMY (H): Primary | ICD-10-CM

## 2022-02-21 NOTE — PROGRESS NOTES
[facility] Care Coordination Contact     CHW lvm to remind mbr to schedule diabetic eye exam. Left contact info if questions.       Mbr, rtn called she will schedule appt  at [facility].    GENO Childress  [facility]  [phone]

## 2022-02-22 RX ORDER — ASPIRIN 81 MG
TABLET,CHEWABLE ORAL
Qty: 30 TABLET | Refills: 11 | Status: SHIPPED | OUTPATIENT
Start: 2022-02-22 | End: 2022-12-06

## 2022-02-22 RX ORDER — CETIRIZINE HYDROCHLORIDE 10 MG/1
TABLET ORAL
Qty: 90 TABLET | Refills: 1 | Status: SHIPPED | OUTPATIENT
Start: 2022-02-22 | End: 2022-08-31

## 2022-02-22 NOTE — TELEPHONE ENCOUNTER
Prescription approved per [facility] Refill Protocol.  Nurys Yoder RN, BSN   [facility] [place]

## 2022-02-22 NOTE — TELEPHONE ENCOUNTER
Prescription approved per [facility] Refill Protocol.  Nurys Yoder RN, BSN   [facility] [place]

## 2022-02-23 ENCOUNTER — MEDICAL CORRESPONDENCE (OUTPATIENT)
Dept: HEALTH INFORMATION MANAGEMENT | Facility: CLINIC | Age: 67
End: 2022-02-23

## 2022-02-23 ENCOUNTER — HOSPITAL ENCOUNTER (OUTPATIENT)
Dept: WOUND CARE | Facility: CLINIC | Age: 67
Discharge: HOME | End: 2022-02-23
Attending: INTERNAL MEDICINE | Admitting: INTERNAL MEDICINE
Payer: MEDICARE

## 2022-02-23 DIAGNOSIS — Z93.3 S/P COLOSTOMY (H): ICD-10-CM

## 2022-02-23 PROCEDURE — G0463 HOSPITAL OUTPT CLINIC VISIT: HCPCS

## 2022-02-23 NOTE — PROGRESS NOTES
Reason For Visit: Holly Muir, 66 year old female, seen as outpatient to evaluate and treat colostomy and peristomal skin concerns. Referred by Dr Tiffany King MD. Patient presents by herself today.      History: Pt had a colostomy placed in 2000 in [place].  She has had trouble for the last few months with daily leakages from her appliance causing peristomal skin denudement.  She recently started to run out of her normal salty appliance #8588 (one piece convex cut to fit appliance with use of a barrier ring).  She has not used any stoma powder or skin prep products in the past for the peristomal skin.  She contact the Wound and Ostomy clinic last week with concern of being nearly out of supplies.  I provided her with [place] appliance's #8531 and [employer] 2 inch flat barrier rings #7805.     Personal/social history: Pt lives with family.      Objective:   Physical appearance: Alert and oriented  Current treatment plan: [place] # 8531 flat cut to fit appliance with one full [employer] barrier ring #7803  Last changed: yesterday    Ostomy assessment:  Colostomy located in the left lower abdomen  Stoma is pale pink.  Measures 27 mm x 31 mm and protrudes 0.5 cm at max of the immediate surrounding skin but lays within a tissue depression so is basically flush with the abdominal tissue 1 to 3 cm from the stomas edge.  Lumen is centered.  Per the pt her output is normally all liquidy, empties appliance 5 to 7 times daily and occasionally is half full.  Peristomal skin has some areas of scattered partial thickness denudement on the edge from 12 o'clock to 5 o'clock, and some areas of hypergranular tissue on the 8 to 9 o'clock edge.    Photo from today's visit 2/23/22, initial visit to the Wound and Ostomy clinic.    Dorsalis Pedal Pulse: Not assessed this visit.  Posterior Tibial Pulse: Not assessed this visit.  Hair growth: Not assessed this visit.  Capillary Refill: Not assessed this visit.  Feet/toes color: Not  assessed this visit.  Nails: Not assessed this visit.  R Leg: Edema Not assessed this visit. Ankle circumference NA cm. Calf circumference NA cm.  L Leg: Edema Not assessed this visit. Ankle circumference NA cm. Calf circumference NA cm.    Mobility: slow but independent with use of single pointed cane  Current offloading/footwear: Not assessed this visit.  Sensation: Not assessed this visit.  HgbA1C: 8.9 Date: 11/12/21  Checks Blood Glucose?:  checking 3 times daily at least, Average Readings: 300's  Other callousing/areas of concern: None noted    Diet: Regular  Smoking: recent quit smoking again    Discussed: etiology of wound (NA colostomy visit), pathophysiology and patient specific goals for wound healing.   Education: Role of woc nurse in the clinic setting.  Stoma and peristomal skin status.  Use of current product and process as listed in plan.  Plans for samples to be ordered to be delivered out to her home and to bring with at follow up visit.  Follow up and contact information for the Wound and Ostomy clinic.      Assessment:  Today the stoma appears healthy but location is within a depression and skin fold of the abdomen.  Peristomal skin irritation with some areas of proud tissue in small amounts on the medial aspect near the 9 o'clock edge.  No signs of infection.    Factors impacting wound healing:   Poor nutrition: inadequate supply of protein, carbohydrates, fatty acids, and trace elements essential for all phases of wound healing  Reduced Blood Supply: inadequate perfusion to heal wound, NA  Medication: NA  Chemotherapy: suppresses the immune system and inflammatory response, NA  Radiotherapy: increases production of free radical which damage cells, NA  Psychological stress: related to frequent ostomy appliance leakages  Obesity: decreases tissue perfusion  Infection: prolongs inflammatory phase, uses vital nutrients, impairs epithelialization and releases toxins, none noted  Underlying Disease:  NA  Maceration: reduces wound tensile strength and inhibits epithelial migration, NA  Patient compliance, appears motivated to improve ostomy appliance wear times  Unrelieved pressure, NA  Immobility, NA  Substance abuse: NA    Plan:  For now we will use the products listed below or the pt can use her supplies at home also till the new samples arrive.  With any of the products pt was instructed to use the stoma powder and Cavilon liquid bandage on the irritated or raw peristomal skin.  I will order samples from both [place] and [employer] to be mailed out to her home.  Below instructions given to the pt for use of two piece appliance placed today.  I feel deep convexity from [employer] will be the best fitting product.    1 Gather supplies.  - [place] two piece appliance: with Skin Barrier Wafer #67911 and Pouch # 47690  - [employer] barrier rings # 7805  - Skin barrier pattern, magic marker, Stoma Powder, Cavilon no sting skin prep, paper towels, garbage bag, scissors, warm water.     2 Prepare new appliance.  - Close appliance end (Velcro).  - Cut hole in the Skin Barrier Wafer #89351 at 1 inch circular.  - Remove the adhesive backing.  - Place one full Adapt barrier ring # 7805 on Skin barrier wafer around the hole and press in place.    3 Remove old appliance.  - Cleanse skin and stoma with warm water and paper towels and dry well.  - Apply Stoma Powder to the skin around the stoma, brush of blow off excess.  - Apply the Cavilon on all the surrounding skin of the stoma.  - Let dry about 30 to 60 seconds.    4 Apply new appliance.  - Snap the newly prepared Skin Barrier Wafer to the Pouch and place the appliance over the stoma.  - Remove the two paper adhesive backings and press into place.  - Lay palm of your hand over the stoma to warm and form the appliance into place.     Topical care: as listed above  Offloading:  NA  Additional recommendations: none at this time    Wound Care: NA no wound cares.    Discussed  plan of care with patient. Teaching done with patient for ostomy appliance changes; pt is able and willing to perform.    The following discharge instructions were reviewed with and sent home with the patient: See AVS    The following supplies were sent home with the patient:  [place] two piece appliances and [employer] barrier rings  Will reassess care plan in 1 week and order patient supplies as needed    Return visit: 3/9/22    Verbal, written, & demonstrative education provided.  Face to face time: approximately 40 minutes.  Procedure: CATRACHO    [phone]

## 2022-02-24 ENCOUNTER — VIRTUAL VISIT (OUTPATIENT)
Dept: FAMILY MEDICINE | Facility: CLINIC | Age: 67
End: 2022-02-24
Payer: MEDICARE

## 2022-02-24 ENCOUNTER — PATIENT OUTREACH (OUTPATIENT)
Dept: GERIATRIC MEDICINE | Facility: CLINIC | Age: 67
End: 2022-02-24

## 2022-02-24 DIAGNOSIS — Z93.3 S/P COLOSTOMY (H): Primary | ICD-10-CM

## 2022-02-24 DIAGNOSIS — F25.9 SCHIZOAFFECTIVE DISORDER, UNSPECIFIED TYPE (H): ICD-10-CM

## 2022-02-24 DIAGNOSIS — E66.01 MORBID OBESITY (H): ICD-10-CM

## 2022-02-24 DIAGNOSIS — N18.31 CHRONIC KIDNEY DISEASE, STAGE 3A (H): ICD-10-CM

## 2022-02-24 DIAGNOSIS — M25.561 ARTHRALGIA OF BOTH KNEES: ICD-10-CM

## 2022-02-24 DIAGNOSIS — S92.352A CLOSED FRACTURE OF FIFTH METATARSAL BONE OF LEFT FOOT, PHYSEAL INVOLVEMENT UNSPECIFIED, INITIAL ENCOUNTER: ICD-10-CM

## 2022-02-24 DIAGNOSIS — E11.65 TYPE 2 DIABETES MELLITUS WITH HYPERGLYCEMIA, WITH LONG-TERM CURRENT USE OF INSULIN (H): ICD-10-CM

## 2022-02-24 DIAGNOSIS — M25.562 ARTHRALGIA OF BOTH KNEES: ICD-10-CM

## 2022-02-24 DIAGNOSIS — Z79.4 TYPE 2 DIABETES MELLITUS WITH HYPERGLYCEMIA, WITH LONG-TERM CURRENT USE OF INSULIN (H): ICD-10-CM

## 2022-02-24 DIAGNOSIS — M46.96 UNSPECIFIED INFLAMMATORY SPONDYLOPATHY, LUMBAR REGION (H): ICD-10-CM

## 2022-02-24 DIAGNOSIS — G25.81 RESTLESS LEG SYNDROME: ICD-10-CM

## 2022-02-24 PROCEDURE — 99214 OFFICE O/P EST MOD 30 MIN: CPT | Mod: 95 | Performed by: INTERNAL MEDICINE

## 2022-02-24 RX ORDER — TRAMADOL HYDROCHLORIDE 50 MG/1
50 TABLET ORAL 2 TIMES DAILY PRN
Qty: 60 TABLET | Refills: 0 | Status: SHIPPED | OUTPATIENT
Start: 2022-02-24 | End: 2022-05-24

## 2022-02-24 RX ORDER — GABAPENTIN 600 MG/1
TABLET ORAL
Qty: 120 TABLET | Refills: 4 | Status: SHIPPED | OUTPATIENT
Start: 2022-02-24 | End: 2022-07-14

## 2022-02-24 NOTE — PROGRESS NOTES
Holly is a 66 year old who is being evaluated via a billable telephone visit.      What phone number would you like to be contacted at? [phone]   How would you like to obtain your AVS? Dinesh    Assessment & Plan     Diagnoses and all orders for this visit:    S/P colostomy (H)  Comments:  wound care visit yesterday, trying new colostomy bag.     Schizoaffective disorder, unspecified type (H)  Comments:  follows with psychiatry    Unspecified inflammatory spondylopathy, lumbar region (H)    Type 2 diabetes mellitus with hyperglycemia, with long-term current use of insulin (H)  Comments:  follows with endocrine    Morbid obesity (H)  Comments:  weight loss limited due to chronic pain and stress eating.     Chronic kidney disease, stage 3a (H)  Comments:  Cr stable    Arthralgia of both knees  Comments:  uses Tramadol < 3x/day for now, will reduce dose to bid prn and follow refill trend. spread out day time gabapentin to 600 mg bid, 1200 mg bedtime  Orders:  -     traMADol (ULTRAM) 50 MG tablet; Take 1 tablet (50 mg) by mouth 2 times daily as needed for severe pain  -     gabapentin (NEURONTIN) 600 MG tablet; Take 1 tablet (600 mg) by mouth every morning AND 1 tablet (600 mg) daily (with lunch) AND 2 tablets (1,200 mg) At Bedtime.    Closed fracture of fifth metatarsal bone of left foot, physeal involvement unspecified, initial encounter  -     traMADol (ULTRAM) 50 MG tablet; Take 1 tablet (50 mg) by mouth 2 times daily as needed for severe pain  -     gabapentin (NEURONTIN) 600 MG tablet; Take 1 tablet (600 mg) by mouth every morning AND 1 tablet (600 mg) daily (with lunch) AND 2 tablets (1,200 mg) At Bedtime.    Restless leg syndrome  Comments:  gabapentin  Orders:  -     gabapentin (NEURONTIN) 600 MG tablet; Take 1 tablet (600 mg) by mouth every morning AND 1 tablet (600 mg) daily (with lunch) AND 2 tablets (1,200 mg) At Bedtime.       I spent a total of 36 minutes on the day of the visit.  doing chart  review, history and exam, documentation and further activities as noted above        Return in about 3 months (around 5/24/2022) for Pain management.    Tiffany King MD PhD  [facility] LARRY Barrera is a 66 year old who presents for the following health issues     She went to see her father at the end of December. She had some Tramadol then but has been out for a while. She didn't fill any prescription in [place].    She buried her father on 2/3/2022. She returned from [place] on 2/16/2022. She has been out of the medication for a while.     Per our chart, her last refill of tramadol was 11/6/2021.   Last gabapentin refill was 11/20/2021.     She has been sweating and hot flashes. She is not sure for how long.     Looks like she has not been taking either Tramadol and gabapentin for about 2 months.     She has restless legs. Her kids picked up prescriptions for her a couple of days ago. She reported gabapentin is in her pill box. Her daughter sets up her meds for her.              Review of Systems   Constitutional, HEENT, cardiovascular, pulmonary, gi and gu systems are negative, except as otherwise noted.      Objective           Vitals:  No vitals were obtained today due to virtual visit.    Physical Exam   healthy, alert and no distress  PSYCH: Alert and oriented times 3; coherent speech, normal   rate and volume, able to articulate logical thoughts, able   to abstract reason, no tangential thoughts, no hallucinations   or delusions  Her affect is normal  RESP: No cough, no audible wheezing, able to talk in full sentences  Remainder of exam unable to be completed due to telephone visits            Phone call duration: 28 minutes

## 2022-02-24 NOTE — PROGRESS NOTES
[facility] Care Coordination Contact    Tc from member requesting assistance with colostomy supplies. CC reached out to Agnieszka Liui waiver worker with [employer] after this CC was unable to obtain more colostomy bags than the 20 Medicare will allow. CC had requested an order from the member's PCP for the Wound Ostomy Nurse in [facility]. The member was seen by Ruben CAMPOS and he is trying a new appliance. The member states she will need more bags before her next appt at the wound clinic. Agnieszka was able to get [employer] to assist with billing [place]/ [place] for the additional bags #70. CC also schedule a telephonic visit on 3/3/2022 with the member. Divya Smalls RN,BSN  [facility] Case Coordinator   [phone]

## 2022-02-28 ENCOUNTER — TELEPHONE (OUTPATIENT)
Dept: FAMILY MEDICINE | Facility: CLINIC | Age: 67
End: 2022-02-28
Payer: COMMERCIAL

## 2022-02-28 NOTE — TELEPHONE ENCOUNTER
Patient called and states that at her visit on 2/24/22 she mentioned that she is having dizziness and everything seems to spin. She states that when she lays down or is sitting up the whole room is spinning. I do not see any mention of this in her note from that day.     She took her BP earlier today and was 120/60  While I was on the phone with her it was 98/50 and when she tookd it again it was 95/50.    Advised patient to drink lots of fluids to try and bring up her BP this afternoon. Patient wondering if she should make any further changes with her medications at this time?    Geetha Aragon RN [facility]

## 2022-03-01 NOTE — TELEPHONE ENCOUNTER
Called patient and instructed to hold medication per providers instructions. Will monitor her BP for the week and set up for appointment 3/8/22 to follow up.    Geetha Aragon RN [facility]

## 2022-03-01 NOTE — TELEPHONE ENCOUNTER
Please advice pt to hold Ramipril for now. Monitor BP daily. Virtual visit (phone okay) next Tuesday.

## 2022-03-02 ENCOUNTER — PATIENT OUTREACH (OUTPATIENT)
Dept: GERIATRIC MEDICINE | Facility: CLINIC | Age: 67
End: 2022-03-02
Payer: COMMERCIAL

## 2022-03-02 ASSESSMENT — ACTIVITIES OF DAILY LIVING (ADL)
DEPENDENT_IADLS:: CLEANING;COOKING;LAUNDRY;SHOPPING;MEAL PREPARATION;MEDICATION MANAGEMENT;MONEY MANAGEMENT;TRANSPORTATION;INCONTINENCE
DEPENDENT_IADLS:: CLEANING;COOKING;LAUNDRY;SHOPPING;MEAL PREPARATION;MEDICATION MANAGEMENT;TRANSPORTATION;MONEY MANAGEMENT

## 2022-03-02 ASSESSMENT — PATIENT HEALTH QUESTIONNAIRE - PHQ9: SUM OF ALL RESPONSES TO PHQ QUESTIONS 1-9: 16

## 2022-03-02 NOTE — PROGRESS NOTES
[facility] Care Coordination Contact    [facility] Home Visit Assessment     Home visit for Health Risk Assessment with Holly Muir completed on March 2, 2022    Type of residence:: Private home - stairs  Current living arrangement:: I live in a private home with family          Current Care Plan  Member currently receiving the following home care services: None    Member currently receiving the following community resources: PCA, Meals on Wheels, [employer]  [employer] med machine    Medication Review  Medication reconciliation completed in Epic: Yes  Medication set-up & administration: Family/informal caregiver sets up weekly.  Family caregiver administers medications.  Medication Risk Assessment Medication (1 or more, place referral to MTM): Taking 1 or more high-risk medications for adults >65 years  MTM Referral Placed: No: declined    Mental/Behavioral Health   Depression Screening:      PHQ-9 Total Score: 16    Mental health DX:: No   Mental health DX how managed:: None    Falls Assessment:   Fallen 2 or more times in the past year?: No   Any fall with injury in the past year?: No    ADL/IADL Dependencies:   Dependent ADLs:: Ambulation-cane, Bathing, Dressing, Transfers, Positioning  Dependent IADLs:: Cleaning, Cooking, Laundry, Shopping, Meal Preparation, Medication Management, Transportation, Money Management    [facility] sponsored benefits: Shared information re: Silver Sneakers/gym memberships, ASA, Calcium +D.    PCA Assessment completed at visit: Not Applicable     Elderly Waiver Eligibility: Member is on CADI waiver.    Care Plan & Recommendations: Member is on CADI waiver . CC helped the member with obtaining a new company for supplies.     See [facility] for detailed assessment information.    Follow-Up Plan: Member informed of future contact, plan to f/u with member with a 6 month telephone assessment.  Contact information shared with member and family, encouraged member to call  with any questions or concerns at any time.    [place] care continuum providers: Please refer to Health Care Home on the Epic Problem List to view this patient's [facility] Care Plan Summary.    Divya Smalls RN,BSN  [facility] Case Coordinator   [phone]

## 2022-03-04 ENCOUNTER — OFFICE VISIT (OUTPATIENT)
Dept: OPTOMETRY | Facility: CLINIC | Age: 67
End: 2022-03-04
Payer: MEDICARE

## 2022-03-04 VITALS — SYSTOLIC BLOOD PRESSURE: 130 MMHG | HEART RATE: 62 BPM | DIASTOLIC BLOOD PRESSURE: 76 MMHG

## 2022-03-04 DIAGNOSIS — Z79.4 CONTROLLED TYPE 2 DIABETES MELLITUS WITH BOTH EYES AFFECTED BY MILD NONPROLIFERATIVE RETINOPATHY WITHOUT MACULAR EDEMA, WITH LONG-TERM CURRENT USE OF INSULIN (H): Primary | ICD-10-CM

## 2022-03-04 DIAGNOSIS — H52.4 PRESBYOPIA: ICD-10-CM

## 2022-03-04 DIAGNOSIS — E11.3293 CONTROLLED TYPE 2 DIABETES MELLITUS WITH BOTH EYES AFFECTED BY MILD NONPROLIFERATIVE RETINOPATHY WITHOUT MACULAR EDEMA, WITH LONG-TERM CURRENT USE OF INSULIN (H): Primary | ICD-10-CM

## 2022-03-04 DIAGNOSIS — H53.2 DIPLOPIA: ICD-10-CM

## 2022-03-04 DIAGNOSIS — H26.493 POSTERIOR CAPSULAR OPACIFICATION VISUALLY SIGNIFICANT OF BOTH EYES: ICD-10-CM

## 2022-03-04 DIAGNOSIS — Z96.1 PSEUDOPHAKIA: ICD-10-CM

## 2022-03-04 PROCEDURE — 92015 DETERMINE REFRACTIVE STATE: CPT | Mod: GY | Performed by: OPTOMETRIST

## 2022-03-04 PROCEDURE — 92004 COMPRE OPH EXAM NEW PT 1/>: CPT | Performed by: OPTOMETRIST

## 2022-03-04 ASSESSMENT — CUP TO DISC RATIO
OD_RATIO: 0.2
OS_RATIO: 0.2

## 2022-03-04 ASSESSMENT — REFRACTION_MANIFEST
OD_SPHERE: -1.25
OD_CYLINDER: +2.00
OS_SPHERE: -0.25
OD_AXIS: 029
OS_AXIS: 130
OS_ADD: +2.50
OS_CYLINDER: +0.50
OD_ADD: +2.50

## 2022-03-04 ASSESSMENT — TONOMETRY
OS_IOP_MMHG: 08
OD_IOP_MMHG: 12
IOP_METHOD: ICARE

## 2022-03-04 ASSESSMENT — REFRACTION_FINALRX
OS_HBASE: IN
OS_HPRISM: 2.5
OD_HBASE: IN
OD_HPRISM: 2.5

## 2022-03-04 ASSESSMENT — SLIT LAMP EXAM - LIDS
COMMENTS: NORMAL
COMMENTS: NORMAL

## 2022-03-04 ASSESSMENT — VISUAL ACUITY
METHOD: SNELLEN - LINEAR
CORRECTION_TYPE: GLASSES
OS_SC+: -2
OS_SC: 20/25
OD_PH_SC: 20/30
OD_SC+: -2
OD_PH_SC+: -1
OD_SC: 20/40

## 2022-03-04 ASSESSMENT — EXTERNAL EXAM - RIGHT EYE: OD_EXAM: NORMAL

## 2022-03-04 ASSESSMENT — CONF VISUAL FIELD
OD_NORMAL: 1
OS_NORMAL: 1
METHOD: COUNTING FINGERS

## 2022-03-04 ASSESSMENT — EXTERNAL EXAM - LEFT EYE: OS_EXAM: NORMAL

## 2022-03-04 NOTE — LETTER
3/4/2022         RE: Holly Muir  [address] [address]        Dear Colleague,    Thank you for referring your patient, Holly Muir, to the [facility]. Please see a copy of my visit note below.    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 Encouraged continued blood glucose, pressure, and lipid control. Patient should continue following recommendations of primary care provider. Patient should plan on returning to clinic annually for a dilated eye exam but was encouraged to return to clinic sooner with new flashes/floaters or other vision changes.     (H53.2) Diplopia  Comment: Horizontal diplopia improved with prisms. Longstanding intermittent diplopia  Plan: Rx for prism glasses generated for patient. Some adaptation period will be needed. Consider strabismus referral if patient is unable to adjust to wearing glasses full time to help with diplopia.     (H26.493) Posterior capsular opacification visually significant of both eyes  Comment: Symptomatic for glare and blur during night driving  Plan: Will refer to general ophthalmology for YAG     (Z96.1) Pseudophakia  Plan: See above.     (H52.4) Presbyopia  Plan: REFRACTION [[id number]]        Discussed findings with patient. New spectacle prescription dispensed to patient. Patient is welcome to return to clinic with prolonged adaptation difficulties.       Complete documentation of historical and exam elements from today's encounter can  be found in the full encounter summary report (not reduplicated in this progress  note). I personally obtained the chief complaint(s) and history of present illness. I  confirmed and edited as necessary the review of systems, past  medical/surgical  history, family history, social history, and examination findings as documented by  others; and I examined the patient myself. I personally reviewed the relevant tests,  images, and reports as documented above. I formulated and edited as necessary the  assessment and plan and discussed the findings and management plan with the  patient and family.    Joseph Martinez OD       Again, thank you for allowing me to participate in the care of your patient.        Sincerely,        Joseph Martinez OD

## 2022-03-04 NOTE — PROGRESS NOTES
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 Encouraged continued blood glucose, pressure, and lipid control. Patient should continue following recommendations of primary care provider. Patient should plan on returning to clinic annually for a dilated eye exam but was encouraged to return to clinic sooner with new flashes/floaters or other vision changes.     (H53.2) Diplopia  Comment: Horizontal diplopia improved with prisms. Longstanding intermittent diplopia  Plan: Rx for prism glasses generated for patient. Some adaptation period will be needed. Consider strabismus referral if patient is unable to adjust to wearing glasses full time to help with diplopia.     (H26.493) Posterior capsular opacification visually significant of both eyes  Comment: Symptomatic for glare and blur during night driving  Plan: Will refer to general ophthalmology for YAG     (Z96.1) Pseudophakia  Plan: See above.     (H52.4) Presbyopia  Plan: REFRACTION [[id number]]        Discussed findings with patient. New spectacle prescription dispensed to patient. Patient is welcome to return to clinic with prolonged adaptation difficulties.       Complete documentation of historical and exam elements from today's encounter can  be found in the full encounter summary report (not reduplicated in this progress  note). I personally obtained the chief complaint(s) and history of present illness. I  confirmed and edited as necessary the review of systems, past medical/surgical  history, family history, social history, and examination findings as documented by  others; and I examined the patient myself. I personally reviewed the relevant tests,  images, and reports as documented above. I formulated and edited as necessary the  assessment  and plan and discussed the findings and management plan with the  patient and family.    Joseph Martinez, ANNI

## 2022-03-04 NOTE — NURSING NOTE
Chief Complaints and History of Present Illnesses   Patient presents with     Annual Eye Exam     Chief Complaint(s) and History of Present Illness(es)     Annual Eye Exam     Laterality: both eyes    Course: gradually worsening    Associated symptoms: double vision, glare and headache.  Negative for eye pain    Treatments tried: no treatments    Pain scale: 0/10              Comments     Diabetic Eye exam.  She states that her vision has seemed worse in both eyes since her last eye exam.  She constantly has horizontal double vision.   If she closes an eye the double resolves.  Sometimes she can blink or squint and make the double disappear but at times she is not able to resolve the issue.      She is also bothered by glare with sunlight and oncoming headlights.  She tells me that it is a good thing that she does not drive because she so often has to close her eyes in these situations.    Lab Results       Component                Value               Date                       A1C                      8.9                 11/12/2021                 A1C                      10.5                10/08/2021                 A1C                      7.7                 01/06/2021                 A1C                      7.9                 12/11/2020                 A1C                      10.6                08/24/2020                 A1C                      9.5                 06/18/2020              Simran Valdez, COT 9:01 AM  March 4, 2022

## 2022-03-07 ENCOUNTER — OFFICE VISIT (OUTPATIENT)
Dept: ORTHOPEDICS | Facility: CLINIC | Age: 67
End: 2022-03-07
Payer: MEDICARE

## 2022-03-07 DIAGNOSIS — M17.12 PRIMARY OSTEOARTHRITIS OF LEFT KNEE: ICD-10-CM

## 2022-03-07 DIAGNOSIS — M17.11 PRIMARY OSTEOARTHRITIS OF RIGHT KNEE: ICD-10-CM

## 2022-03-07 DIAGNOSIS — M54.16 LUMBAR RADICULOPATHY: Primary | ICD-10-CM

## 2022-03-07 PROCEDURE — 20610 DRAIN/INJ JOINT/BURSA W/O US: CPT | Mod: RT | Performed by: FAMILY MEDICINE

## 2022-03-07 PROCEDURE — 99213 OFFICE O/P EST LOW 20 MIN: CPT | Mod: 25 | Performed by: FAMILY MEDICINE

## 2022-03-07 RX ORDER — TRIAMCINOLONE ACETONIDE 40 MG/ML
40 INJECTION, SUSPENSION INTRA-ARTICULAR; INTRAMUSCULAR
Status: DISCONTINUED | OUTPATIENT
Start: 2022-03-07 | End: 2022-09-14

## 2022-03-07 RX ADMIN — TRIAMCINOLONE ACETONIDE 40 MG: 40 INJECTION, SUSPENSION INTRA-ARTICULAR; INTRAMUSCULAR at 09:23

## 2022-03-07 NOTE — PROGRESS NOTES
HISTORY OF PRESENT ILLNESS  Ms. Muir is a pleasant 66 year old female following up with bilateral knee osteoarthritis.  Holly is here for bilateral knee injections, her last injections helped for 3-4 months.  She also has ongoing back and bilateral leg pain.  She's interested in a repeat injection.  She did have an injection at L4-5 that did not bring her any lasting relief.  This did bring her relief for the first day.     PHYSICAL EXAM  General  - normal appearance, in no obvious distress  HEENT  - conjunctivae not injected, moist mucous membranes  CV  - normal peripheral perfusion  Pulm  - normal respiratory pattern, non-labored  Musculoskeletal - lumbar spine  - stance: slow to rise and sit  - inspection: normal bone and joint alignment, no obvious scoliosis  - palpation: bilateral lumbar paravertebral spasm  - special tests:  (-) straight leg raise bilaterally  (-) slump test  Neuro  - patellar and Achilles DTRs 2+ bilaterally, no sensory or motor deficit, grossly normal coordination, normal muscle tone  Skin  - no ecchymosis, erythema, warmth, or induration, no obvious rash  Psych  - interactive, appropriate, normal mood and affect        ASSESSMENT & PLAN  Ms. Muir is a 66 year old female following up with bilateral knee osteoarthritis.  She also has ongoing low back pain with intermittent radicular features.    I did repeat her knee injections today (see procedure note).    I reviewed her previous lumbar CT in the room with her, this is from June 2020 and does show stenosis at L4-5 and L5-S1.  Given her minimal relief with her previous lumbar injection I am ordering this injection to be done at L5-S1.  She should let us know in the following couple of weeks if this injection is ineffective.    It was a pleasure seeing Holly.        Santy Harrington, , CAQSM      This note was constructed using Dragon dictation software, please excuse any minor errors in spelling, grammar, or syntax.                Large  Joint Injection/Arthocentesis: bilateral knee    Date/Time: 3/7/2022 9:23 AM  Performed by: Santy Harrington DO  Authorized by: Santy Harrington DO     Indications:  Osteoarthritis and pain  Needle Size:  22 G  Guidance: landmark guided    Approach:  Lateral  Location:  Knee  Laterality:  Bilateral      Medications (Right):  40 mg triamcinolone 40 MG/ML  Medications (Left):  40 mg triamcinolone 40 MG/ML  Outcome:  Tolerated well, no immediate complications  Procedure discussed: discussed risks, benefits, and alternatives    Consent Given by:  Patient  Timeout: timeout called immediately prior to procedure    Prep: patient was prepped and draped in usual sterile fashion         PROCEDURE    Knee Injections - Intraarticular    The patient was informed of the risks and the benefits of the procedure and a written consent was signed.    The patient s left knee was prepped with chlorhexidine in sterile fashion.   40 mg of triamcinolone suspension was drawn up into a 5 mL syringe with 4 mL of 1% lidocaine.  Injection was performed using substerile technique.  A 1.5-inch 22-gauge needle was used to enter the lateral aspect of the left knee.  Injection performed successfully without difficulty.  There were no complications. The patient tolerated the procedure well. There was negligible bleeding.     The patient's right knee was prepped with chlorhexidine in sterile fashion.   40 mg of triamcinolone suspension was drawn up into a 5 mL syringe with 4 mL of 1% lidocaine.  Injection was performed using substerile technique.  A 1.5-inch 22-gauge needle was used to enter the lateral aspect of the right knee.  Injection performed successfully without difficulty.  There were no complications. The patient tolerated the procedure well. There was negligible bleeding.

## 2022-03-07 NOTE — LETTER
3/7/2022         RE: Holly Muir  [address] [address]        Dear Colleague,    Thank you for referring your patient, Holly Muir, to the [facility] [place]. Please see a copy of my visit note below.    HISTORY OF PRESENT ILLNESS  Ms. Muir is a pleasant 66 year old female following up with bilateral knee osteoarthritis.  Holly is here for bilateral knee injections, her last injections helped for 3-4 months.  She also has ongoing back and bilateral leg pain.  She's interested in a repeat injection.  She did have an injection at L4-5 that did not bring her any lasting relief.  This did bring her relief for the first day.     PHYSICAL EXAM  General  - normal appearance, in no obvious distress  HEENT  - conjunctivae not injected, moist mucous membranes  CV  - normal peripheral perfusion  Pulm  - normal respiratory pattern, non-labored  Musculoskeletal - lumbar spine  - stance: slow to rise and sit  - inspection: normal bone and joint alignment, no obvious scoliosis  - palpation: bilateral lumbar paravertebral spasm  - special tests:  (-) straight leg raise bilaterally  (-) slump test  Neuro  - patellar and Achilles DTRs 2+ bilaterally, no sensory or motor deficit, grossly normal coordination, normal muscle tone  Skin  - no ecchymosis, erythema, warmth, or induration, no obvious rash  Psych  - interactive, appropriate, normal mood and affect        ASSESSMENT & PLAN  Ms. Muir is a 66 year old female following up with bilateral knee osteoarthritis.  She also has ongoing low back pain with intermittent radicular features.    I did repeat her knee injections today (see procedure note).    I reviewed her previous lumbar CT in the room with her, this is from June 2020 and does show stenosis at L4-5 and L5-S1.  Given her minimal relief with her previous lumbar injection I am ordering this injection to be done at L5-S1.  She should let us know in  the following couple of weeks if this injection is ineffective.    It was a pleasure seeing Shahana Harrington DO, CAQSM      This note was constructed using Dragon dictation software, please excuse any minor errors in spelling, grammar, or syntax.                Large Joint Injection/Arthocentesis: bilateral knee    Date/Time: 3/7/2022 9:23 AM  Performed by: Santy Harrington DO  Authorized by: Santy Harrington DO     Indications:  Osteoarthritis and pain  Needle Size:  22 G  Guidance: landmark guided    Approach:  Lateral  Location:  Knee  Laterality:  Bilateral      Medications (Right):  40 mg triamcinolone 40 MG/ML  Medications (Left):  40 mg triamcinolone 40 MG/ML  Outcome:  Tolerated well, no immediate complications  Procedure discussed: discussed risks, benefits, and alternatives    Consent Given by:  Patient  Timeout: timeout called immediately prior to procedure    Prep: patient was prepped and draped in usual sterile fashion         PROCEDURE    Knee Injections - Intraarticular    The patient was informed of the risks and the benefits of the procedure and a written consent was signed.    The patient s left knee was prepped with chlorhexidine in sterile fashion.   40 mg of triamcinolone suspension was drawn up into a 5 mL syringe with 4 mL of 1% lidocaine.  Injection was performed using substerile technique.  A 1.5-inch 22-gauge needle was used to enter the lateral aspect of the left knee.  Injection performed successfully without difficulty.  There were no complications. The patient tolerated the procedure well. There was negligible bleeding.     The patient's right knee was prepped with chlorhexidine in sterile fashion.   40 mg of triamcinolone suspension was drawn up into a 5 mL syringe with 4 mL of 1% lidocaine.  Injection was performed using substerile technique.  A 1.5-inch 22-gauge needle was used to enter the lateral aspect of the right knee.  Injection performed successfully without  difficulty.  There were no complications. The patient tolerated the procedure well. There was negligible bleeding.                   Again, thank you for allowing me to participate in the care of your patient.        Sincerely,        Santy Harrington, DO

## 2022-03-07 NOTE — PATIENT INSTRUCTIONS
Thanks for coming today.  Ortho/Sports Medicine Clinic  [address] [place] [address]    To schedule future appointments in Ortho Clinic, you may call [phone].    To schedule ordered imaging by your Provider: Call [facility] at [phone]    [employer] available online at:   [email or web address]    Please call if any further questions or concerns [phone] and ask for the Orthopedic Department. Clinic hours 8 am to 5 pm.    Return to clinic if symptoms worsen.

## 2022-03-08 ENCOUNTER — ALLIED HEALTH/NURSE VISIT (OUTPATIENT)
Dept: AUDIOLOGY | Facility: CLINIC | Age: 67
End: 2022-03-08
Payer: MEDICARE

## 2022-03-08 ENCOUNTER — TELEPHONE (OUTPATIENT)
Dept: FAMILY MEDICINE | Facility: CLINIC | Age: 67
End: 2022-03-08

## 2022-03-08 DIAGNOSIS — K21.9 LPRD (LARYNGOPHARYNGEAL REFLUX DISEASE): Primary | ICD-10-CM

## 2022-03-08 DIAGNOSIS — H90.3 SENSORINEURAL HEARING LOSS (SNHL) OF BOTH EARS: Primary | ICD-10-CM

## 2022-03-08 PROCEDURE — 92567 TYMPANOMETRY: CPT | Performed by: AUDIOLOGIST

## 2022-03-08 PROCEDURE — 99207 PR NO CHARGE LOS: CPT | Performed by: AUDIOLOGIST

## 2022-03-08 PROCEDURE — 92557 COMPREHENSIVE HEARING TEST: CPT | Performed by: AUDIOLOGIST

## 2022-03-08 RX ORDER — TRAMADOL HYDROCHLORIDE 50 MG/1
50 TABLET ORAL EVERY 6 HOURS PRN
Qty: 18 TABLET | Refills: 0 | Status: SHIPPED | OUTPATIENT
Start: 2022-03-08 | End: 2022-03-11

## 2022-03-08 NOTE — PROGRESS NOTES
AUDIOLOGY REPORT    SUBJECTIVE:  Holly Muir is a 66 year old female who was seen in the Audiology Clinic at the [facility] for audiologic evaluation, referred by Jaydon Gaona M.D. .The patient has been seen previously in this clinic on 09/01/2015 for assessment and results indicated a mild to moderate sensorineural hearing loss bilaterally. The patient was fit with Phonak Audeo  hearing aids on 9/28/2015.  The patient denies new concerns about her hearing. However, she reports episodes of dizziness with 4 falls within the last year. She reports that this is being managed by her primary care provider. The patient reports that her left hearing aid seems to be cutting out occasionally.      OBJECTIVE:    Otoscopic exam indicates ears are clear of cerumen bilaterally     Pure Tone Thresholds assessed using conventional audiometry with good  reliability from 250-8000 Hz bilaterally using insert earphones     RIGHT:  mild sloping to moderate sensorineural hearing loss    LEFT:    mild sloping to moderate sensorineural hearing loss    Tympanogram:    RIGHT: restricted eardrum mobility     LEFT:   normal eardrum mobility    Reflexes (reported by stimulus ear):  Could not seal    Speech Reception Threshold:    RIGHT: 40 dB HL    LEFT:   35 dB HL  Word Recognition Score:     RIGHT: 100% at 75 dB HL using NU-6 recorded word list.    LEFT:   100% at 75 dB HL using NU-6 recorded word list.      ASSESSMENT:     ICD-10-CM    1. Sensorineural hearing loss (SNHL) of both ears  H90.3 Cmprhn Audiometry Thrshld Eval & Speech Recog (90178)     Tympanometry (impedance - testing) (72283)       Compared to patient's previous audiogram dated 9/01/2015, hearing has remained stable overall. Today s results were discussed with the patient in detail.     A hearing aid check was performed. The patient's hearing aids were cleaned and wax traps replaced. Real-ear verification was performed to ensure the  devices met NAL-NL2 targets without exceeding UCLs. Options were discussed. The patient expressed interest in new devices.     PLAN:  Patient was counseled regarding hearing loss and impact on communication.  Patient is a good candidate for new amplification at this time. It is recommended that the patient follow-up with ENT prior to a trial with hearing aids.  Please call this clinic with questions regarding these results or recommendations.        Cherie Powers.  Doctor of Audiology  [place] License # [id number]

## 2022-03-09 ENCOUNTER — TELEPHONE (OUTPATIENT)
Dept: FAMILY MEDICINE | Facility: CLINIC | Age: 67
End: 2022-03-09
Payer: COMMERCIAL

## 2022-03-09 ENCOUNTER — HOSPITAL ENCOUNTER (OUTPATIENT)
Dept: WOUND CARE | Facility: CLINIC | Age: 67
Discharge: HOME | End: 2022-03-09
Attending: INTERNAL MEDICINE | Admitting: INTERNAL MEDICINE
Payer: MEDICARE

## 2022-03-09 DIAGNOSIS — H90.3 BILATERAL SENSORINEURAL HEARING LOSS: Primary | ICD-10-CM

## 2022-03-09 PROCEDURE — G0463 HOSPITAL OUTPT CLINIC VISIT: HCPCS

## 2022-03-09 NOTE — TELEPHONE ENCOUNTER
----- Message from Marilin England sent at 3/8/2022 10:29 AM CST -----  Regarding: audiology referral  Hi,    This patient is due for a hearing evaluation today. Can you please put an order in epic for this?     Thanks!    Cherie Powers.  Doctor of Audiology  [place] License # [id number]

## 2022-03-09 NOTE — DISCHARGE INSTRUCTIONS
Today we made some assessments of your stoma and the surrounding skin.  For now we will use the products listed below or you can use your supplies at home also till the new samples arrive.  With any of the products you use, be sure to use the stoma powder and Cavilon liquid bandage on the irritated or raw skin, this will help keep the appliance from leakage as often.  I will order you samples from both Valarie and Coloplast to start with, these will be mailed out to your home.  I will plan to see you again in two weeks on Wednesday March the 9th at 10 am.    Nice to meet you and I will see you in two weeks.    Ruben Marin RN cwocn      1 Gather supplies.  - [place] two piece appliance: with Skin Barrier Wafer #74982 and Pouch # 29204  - Adapt barrier rings # 7805  - Skin barrier pattern, magic marker, Stoma Powder, Cavilon no sting skin prep, paper towels, garbage bag, scissors, warm water.     2 Prepare new appliance.  - Close appliance end (Velcro).  - Cut hole in the Skin Barrier Wafer #04280 at 1 inch circular.  - Remove the adhesive backing.  - Place one full Adapt barrier ring # 7805 on Skin barrier wafer around the hole and press in place.    3 Remove old appliance.  - Cleanse skin and stoma with warm water and paper towels and dry well.  - Apply Stoma Powder to the skin around the stoma, brush of blow off excess.  - Apply the Cavilon on all the surrounding skin of the stoma.  - Let dry about 30 to 60 seconds.    4 Apply new appliance.  - Snap the newly prepared Skin Barrier Wafer to the Pouch and place the appliance over the stoma.  - Remove the two paper adhesive backings and press into place.  - Lay palm of your hand over the stoma to warm and form the appliance into place.

## 2022-03-09 NOTE — DISCHARGE INSTRUCTIONS
Today continue with the same plan you have been doing with your appliance changes and use of the stoma powder and liquid bandage.  You will be receiving samples and I will follow up on their delay today to make sure you have them for next week.    I will see you again in one week on Wednesday March the 16 th at 10 am.    Bring all your samples with you when you return to the clinic next week.    Any concerns or questions call our scheduling line at [phone] and they will assist you.    Ruben Marin RN cwocn

## 2022-03-10 ENCOUNTER — MEDICAL CORRESPONDENCE (OUTPATIENT)
Dept: HEALTH INFORMATION MANAGEMENT | Facility: CLINIC | Age: 67
End: 2022-03-10
Payer: COMMERCIAL

## 2022-03-11 ENCOUNTER — PATIENT OUTREACH (OUTPATIENT)
Dept: GERIATRIC MEDICINE | Facility: CLINIC | Age: 67
End: 2022-03-11
Payer: COMMERCIAL

## 2022-03-11 NOTE — LETTER
March 11, 2022    HOLLY FELDMAN  [address] [address]        Dear Holly:    At [facility], we are dedicated to improving your health and well-being. Enclosed is the Comprehensive Care Plan that we developed with you on 03/02/2022. Please review the Care Plan carefully.    As a reminder, some of the things we discussed at your visit include:    Your physical and mental health    Ways to reduce falls    Health care needs you may have    Don t forget to contact your care coordinator if you:    Have been hospitalized or plan to be hospitalized     Have had a fall     Have experienced a change in physical health    Are experiencing emotional problems     If you do not agree with your Care Plan, have questions about it, or have experienced a change in your needs, please call me at [phone]. If you are hearing impaired, please call the [place] [employer] at 311 or [phone] (gsxpqx-em-[id number] relay service).    Sincerely,        Divya Smalls RN    E-mail: [email or web address]  Phone: [phone]      [facility]+[id number] [place] [id number]     [id number] (11/18)

## 2022-03-11 NOTE — PROGRESS NOTES
[facility] Care Coordination Contact    Received after visit chart from care coordinator.  Completed following tasks: Mailed copy of care plan to client and Updated services in access    CADI-PCA MNChoice Not in file.  Notified CC.    Mailed Safe Medication information.    Assessment done via phone.  Mailed POC signature page to member requesting they sign and return and provided a stamped return envelope.    Serenity Huntley  Care Management Specialist  [facility]  [phone]

## 2022-03-16 ENCOUNTER — HOSPITAL ENCOUNTER (OUTPATIENT)
Dept: WOUND CARE | Facility: CLINIC | Age: 67
Discharge: HOME | End: 2022-03-16
Admitting: INTERNAL MEDICINE
Payer: MEDICARE

## 2022-03-16 PROCEDURE — G0463 HOSPITAL OUTPT CLINIC VISIT: HCPCS | Mod: 25

## 2022-03-16 PROCEDURE — 17250 CHEM CAUT OF GRANLTJ TISSUE: CPT

## 2022-03-16 NOTE — PROGRESS NOTES
Reason For Visit: Holly Muir, 66 year old female, seen as outpatient to re evaluate and treat colostomy and peristomal skin concerns. Referred by Dr Tiffany King MD. Patient presents with her daughter today.       History: Pt had a colostomy placed in 2000 in [place].  She has had trouble for the last few months with daily leakages from her appliance causing peristomal skin denudement.  She recently started to run out of her normal valarie appliance #8588 (one piece convex cut to fit appliance with use of a barrier ring).  She had not used any stoma powder or skin prep products in the past for the peristomal skin.  She contact the Wound and Ostomy clinic a week prior to her first clinic visit with concern of being nearly out of supplies.  I provided her with Valarie appliance's #8531 and Adapt 2 inch flat barrier rings #7805.      Personal/social history: Pt lives with family.       Objective:   Physical appearance: Alert and oriented  Current treatment plan: [place] # 8531 flat cut to fit appliance with one full Adapt barrier ring #7803  Last changed: yesterday     Ostomy assessment:  Colostomy located in the left lower abdomen  Stoma is pale pink.  Measures 27 mm x 31 mm and protrudes 0.5 cm at max of the immediate surrounding skin but lays within a tissue depression so is basically flush with the abdominal tissue 1 to 3 cm from the stomas edge.  Lumen is centered.  Per the pt her output is normally all liquidy but she reports it has been getting thicker with use of imodium. empties appliance 5 to 7 times daily and occasionally is half full.  Peristomal skin has some areas of scattered partial thickness denudement on the edge from 12 o'clock to 5 o'clock, and some areas of hypergranular tissue on the 8 to 9 o'clock edge.    Photo from today's visit 3/9/22.      Photo from 2/23/22, initial visit to the Wound and Ostomy clinic.     Dorsalis Pedal Pulse: Not assessed this visit.  Posterior Tibial Pulse: Not  assessed this visit.  Hair growth: Not assessed this visit.  Capillary Refill: Not assessed this visit.  Feet/toes color: Not assessed this visit.  Nails: Not assessed this visit.  R Leg: Edema Not assessed this visit. Ankle circumference NA cm. Calf circumference NA cm.  L Leg: Edema Not assessed this visit. Ankle circumference NA cm. Calf circumference NA cm.     Mobility: slow but independent with use of single pointed cane  Current offloading/footwear: Not assessed this visit.  Sensation: Not assessed this visit.  HgbA1C: 8.9 Date: 11/12/21  Checks Blood Glucose?:  checking 3 times daily at least, Average Readings: 300's, no change  Other callousing/areas of concern: None noted     Diet: Regular  Smoking: recent quit smoking again     Discussed: etiology of wound (NA colostomy visit), pathophysiology and patient specific goals for wound healing.   Education: Stoma and peristomal skin status.  Use of current product and process as listed in plan.  Samples ordered to be delivered out to her home and to bring with at follow up visit.  Follow up and contact information for the Wound and Ostomy clinic.       Assessment:  Pt has not receive any samples, stated still leaking daily but is using the stoma powder and liquid bandage and pain is less at peristomal skin.  Still emptying 6 to 7 times daily but still is slightly thicker.  No change to the stoma and peristomal skin is only slightly improved with less moisture to the site.     Factors impacting wound healing:   Poor nutrition: inadequate supply of protein, carbohydrates, fatty acids, and trace elements essential for all phases of wound healing  Reduced Blood Supply: inadequate perfusion to heal wound, NA  Medication: NA  Chemotherapy: suppresses the immune system and inflammatory response, NA  Radiotherapy: increases production of free radical which damage cells, NA  Psychological stress: related to frequent ostomy appliance leakages  Obesity: decreases tissue  perfusion  Infection: prolongs inflammatory phase, uses vital nutrients, impairs epithelialization and releases toxins, none noted  Underlying Disease: NA  Maceration: reduces wound tensile strength and inhibits epithelial migration, NA  Patient compliance, appears motivated to improve ostomy appliance wear times  Unrelieved pressure, NA  Immobility, NA  Substance abuse: NA     Plan:  We are awaiting the following samples, Coloplat Chip Deep Convex appliance CTF up to 33 mm #83519 plus lubricating deodorant and EBS strips.  [place] round convex ring 25996 and [place] appliance #8275.  She did get insurance approval to receive 90 of her normal Valarie #8588 appliances per month and received a delivery of these last week.    For now we will use the products listed below or the pt can use her supplies at home also till the new samples arrive.  Will continue with any of the products, the stoma powder and Cavilon liquid bandage on the irritated or raw peristomal skin.     1 Gather supplies.  - Valarie two piece appliance: with Skin Barrier Wafer #73844 and Pouch # 07056  - Adapt barrier rings # 7805  - Skin barrier pattern, magic marker, Stoma Powder, Cavilon no sting skin prep, paper towels, garbage bag, scissors, warm water.      2 Prepare new appliance.  - Close appliance end (Velcro).  - Cut hole in the Skin Barrier Wafer #04583 at 1 inch circular.  - Remove the adhesive backing.  - Place one full Adapt barrier ring # 7805 on Skin barrier wafer around the hole and press in place.     3 Remove old appliance.  - Cleanse skin and stoma with warm water and paper towels and dry well.  - Apply Stoma Powder to the skin around the stoma, brush of blow off excess.  - Apply the Cavilon on all the surrounding skin of the stoma.  - Let dry about 30 to 60 seconds.     4 Apply new appliance.  - Snap the newly prepared Skin Barrier Wafer to the Pouch and place the appliance over the stoma.  - Remove the two paper adhesive  backings and press into place.  - Lay palm of your hand over the stoma to warm and form the appliance into place.      Topical care: as listed above  Offloading:  NA  Additional recommendations: none at this time     Wound Care: NA no wound cares.     Discussed plan of care with patient. Teaching done with patient for ostomy appliance changes; pt is able and willing to perform.     The following discharge instructions were reviewed with and sent home with the patient: See AVS     The following supplies were sent home with the patient:  [place] two piece appliances and Adapt barrier rings  Will reassess care plan in 1 week and order patient supplies as needed     Return visit: 3/16/22     Verbal, written, & demonstrative education provided.  Face to face time: approximately 40 minutes.  Procedure: CATRACHO     [phone]

## 2022-03-16 NOTE — DISCHARGE INSTRUCTIONS
Today we tried a new appliance and we did the silver nitrate treatment to the areas around the stoma that were bleeding.    The areas we did the silver nitrate might be sore but should not stay painful.  The silver nitrate will help to close those small wounds and may improve your appliance wear time.    We use the samples from Coloplast, the deep convex one piece appliance.  We did not use any additional barrier ring this time as the convexity is deeper on this appliance which can replace the ring.  Wear this appliance as long as it does not have any signs of leakage.  I also cut two more of the Coloplast deep convex appliance samples to the size and shape of your stoma.  When you do get a leak you can use the two samples or save those till I see you next week.    I will see you again in one week on Wednesday March the 23rd at 2pm.  Any concerns or questions between now and then call our scheduling line at [phone] and they will assist you.    Ruben Marin Rn cwocn

## 2022-03-16 NOTE — PROGRESS NOTES
Reason For Visit: Holly Muir, 67 year old female, seen as outpatient to re evaluate and treat colostomy and peristomal skin concerns. Referred by Dr Tiffany King MD. Patient presents by herself today.       History: Pt had a colostomy placed in 2000 in [place].  She has had trouble for the last few months with daily leakages from her appliance causing peristomal skin denudement.  She recently started to run out of her normal valarie appliance #8588 (one piece convex cut to fit appliance with use of a barrier ring).  She had not used any stoma powder or skin prep products in the past for the peristomal skin.  She contact the Wound and Ostomy clinic a week prior to her first clinic visit with concern of being nearly out of supplies.  I provided her with [place] appliance's #8531 and Adapt 2 inch flat barrier rings #7805.      Personal/social history: Pt lives with family.       Objective:   Physical appearance: Alert and oriented  Current treatment plan: Valarie # 8588 flat cut to fit appliance with one full Adapt barrier ring #7803  Last changed: yesterday     Ostomy assessment:  Colostomy located in the left lower abdomen  Stoma is pale pink.  Measures 22 mm x 33 mm and protrudes 0.5 cm at max of the immediate surrounding skin but lays within a tissue depression so is basically flush with the abdominal tissue 1 to 3 cm from the stomas edge.  Lumen is centered.  Output large in volume, soft to slightly thicker  Peristomal skin has some areas of scattered partial thickness denudement on the edge from 12 o'clock to 5 o'clock, and some areas of hypergranular tissue on the 8 to 9 o'clock edge.      Photo from today's visit 3/16/22.  Before and after silver nitrate treatments.       Photo from 2/23/22, initial visit to the Wound and Ostomy clinic.     Dorsalis Pedal Pulse: Not assessed this visit.  Posterior Tibial Pulse: Not assessed this visit.  Hair growth: Not assessed this visit.  Capillary Refill: Not  assessed this visit.  Feet/toes color: Not assessed this visit.  Nails: Not assessed this visit.  R Leg: Edema Not assessed this visit. Ankle circumference NA cm. Calf circumference NA cm.  L Leg: Edema Not assessed this visit. Ankle circumference NA cm. Calf circumference NA cm.     Mobility: slow but independent with use of single pointed cane  Current offloading/footwear: Not assessed this visit.  Sensation: Not assessed this visit.  HgbA1C: 8.9 Date: 11/12/21  Checks Blood Glucose?:  checking 3 times daily at least, Average Readings: 300's, no change  Other callousing/areas of concern: None noted     Diet: Regular  Smoking: recent quit smoking again     Discussed: etiology of wound (NA colostomy visit), pathophysiology and patient specific goals for wound healing.   Education: Stoma and peristomal skin status.  Use of current product and process as listed in plan.  [place] samples ordered to be delivered out to her home and to bring with at follow up visit.  Follow up and contact information for the Wound and Ostomy clinic.  Teaching on new Chip Coloplast deep convex appliance used today, new pattern for hole and plans for use, different closure and this was reviewed today with pt to practice with one of the extra samples present.  Use of lubricating deodorant started today with teaching on use.     Assessment:  On arrival the pt reports:  - she did receive the Coloplast samples but not the [place]  - she continues to have near daily leakages, has been using her normal [place] #8588 with an Adapt ring #7805.  Also is using the stoma powder and Cavilon skin prep  - she has noticed more bleeding from the right side peristomal skin when she changes her appliance, same location most of her leakages occur, site is also more painful with cares  - still emptying 6 to 7 times daily    Appliance in place was intact with no signs of leakage.  Stoma is unchanged for the most part and appears healthy.  Peristomal skin  has irritation and hypergranular tissue. See photo for details.  Assessment new Coloplast deep convexity appliance it appears to be a good fit with no added ring.     Factors impacting wound healing:   Poor nutrition: inadequate supply of protein, carbohydrates, fatty acids, and trace elements essential for all phases of wound healing  Reduced Blood Supply: inadequate perfusion to heal wound, NA  Medication: NA  Chemotherapy: suppresses the immune system and inflammatory response, NA  Radiotherapy: increases production of free radical which damage cells, NA  Psychological stress: related to frequent ostomy appliance leakages  Obesity: decreases tissue perfusion  Infection: prolongs inflammatory phase, uses vital nutrients, impairs epithelialization and releases toxins, none noted  Underlying Disease: NA  Maceration: reduces wound tensile strength and inhibits epithelial migration, NA  Patient compliance, appears motivated to improve ostomy appliance wear times  Unrelieved pressure, NA  Immobility, NA  Substance abuse: NA     Plan:  I made a new pattern for the pt's ostomy appliance skin barrier wafer and cut the hole in the two remaining Coloplast [place] Deep Convex appliances for her to use.  I used the Coloplat Chip Deep Convex appliance CTF up to 33 mm #99266 plus lubricating deodorant and EBS strips today.  I did not add any additional barrier rings to the skin barrier wafer as the deeper convexity makes good contact with the peristomal skin alone.  I use silver nitrate on the hypergranular tissue today as seen in the picture above.  I used stoma powder and Cavilon skin prep on the surrounding skin prior to applying the product.  Pt will wear the appliance in place till it leaks, requested today she keep record of leakage times so we can determine how well the new appliances are working and what modifications may need to be made.     Topical care: as listed above  Offloading:  NA  Additional recommendations: none at  this time     Wound Care: NA no wound cares.     Discussed plan of care with patient. Teaching done with patient for ostomy appliance changes; pt is able and willing to perform.     The following discharge instructions were reviewed with and sent home with the patient: See AVS     The following supplies were sent home with the patient:  None today  Will reassess care plan in 1 week and order patient supplies as needed     Return visit: 3/23/22     Verbal, written, & demonstrative education provided.  Face to face time: approximately 40 minutes.  Procedure: NA     [phone]

## 2022-03-21 DIAGNOSIS — E11.9 DIABETES MELLITUS (H): ICD-10-CM

## 2022-03-22 ENCOUNTER — ANCILLARY PROCEDURE (OUTPATIENT)
Dept: GENERAL RADIOLOGY | Facility: CLINIC | Age: 67
End: 2022-03-22
Attending: FAMILY MEDICINE
Payer: MEDICARE

## 2022-03-22 DIAGNOSIS — M54.16 LUMBAR RADICULOPATHY: ICD-10-CM

## 2022-03-22 PROCEDURE — 62323 NJX INTERLAMINAR LMBR/SAC: CPT | Performed by: RADIOLOGY

## 2022-03-22 RX ORDER — INSULIN GLARGINE 100 [IU]/ML
INJECTION, SOLUTION SUBCUTANEOUS
Qty: 60 ML | Refills: 3 | Status: SHIPPED | OUTPATIENT
Start: 2022-03-22 | End: 2022-12-28

## 2022-03-22 RX ORDER — IOPAMIDOL 408 MG/ML
10 INJECTION, SOLUTION INTRATHECAL ONCE
Status: COMPLETED | OUTPATIENT
Start: 2022-03-22 | End: 2022-03-22

## 2022-03-22 RX ORDER — BUPIVACAINE HYDROCHLORIDE 5 MG/ML
2 INJECTION, SOLUTION PERINEURAL ONCE
Status: COMPLETED | OUTPATIENT
Start: 2022-03-22 | End: 2022-03-22

## 2022-03-22 RX ORDER — METHYLPREDNISOLONE ACETATE 80 MG/ML
80 INJECTION, SUSPENSION INTRA-ARTICULAR; INTRALESIONAL; INTRAMUSCULAR; SOFT TISSUE ONCE
Status: COMPLETED | OUTPATIENT
Start: 2022-03-22 | End: 2022-03-22

## 2022-03-22 RX ADMIN — BUPIVACAINE HYDROCHLORIDE 10 MG: 5 INJECTION, SOLUTION PERINEURAL at 11:50

## 2022-03-22 RX ADMIN — IOPAMIDOL 2 ML: 408 INJECTION, SOLUTION INTRATHECAL at 11:50

## 2022-03-22 RX ADMIN — METHYLPREDNISOLONE ACETATE 80 MG: 80 INJECTION, SUSPENSION INTRA-ARTICULAR; INTRALESIONAL; INTRAMUSCULAR; SOFT TISSUE at 11:50

## 2022-03-22 NOTE — TELEPHONE ENCOUNTER
BASAGLAR 100 UNIT/ML KWIKPEN  Last Written Prescription Date:  5/10/2021  Last Fill Quantity: 30,   # refills: 3  Last Office Visit :  11/12/2021  Future Office visit:  5/13/2022    Routing refill request to provider for review/approval because:  Drug not on the [facility], P or  Health refill protocol or controlled substance      Noy Allan RN  [facility] Triage Red Flags/Med Refills

## 2022-03-22 NOTE — PROGRESS NOTES
landry was seen in X-ray today for a lumbar epidural injection. Patient rated pain before procedure 6/10. After procedure patient rated pain 2/10.   This pain level is acceptable to patient. Patient discharged home with .

## 2022-03-22 NOTE — PROGRESS NOTES
AFTER YOU GO HOME    ? DO relax; minimize your activity for 24 hours  ? You may resume normal activity tomorrow  ? You may remove the bandage in the evening or next morning  ? You may resume bathing the next day  ? Drink at least 4 extra glasses of fluid today if not on fluid restrictions  ? DO NOT drive or operate machinery at home or at work for at least 24 hours      VISIT THE EMERGENCY ROOM OR URGENT CARE IF:    ? There is redness or swelling at the injection site  ? There is discharge from the injection site  ? You develop a temperature of 101  F or greater      ADDITIONAL INSTRUCTIONS:     ? You may resume your Coumadin or other blood thinner at your regular dose today.  Follow up with your physician to have your INR rechecked if indicated.  ? If you gain no relief from the injection after two (2) weeks, follow-up with your provider for your options.        Contacts:    During business hours from 8 to 5 pm, you may call [phone] to reach a nurse advisor at [facility].  After hours, call [facility]  [phone].  Ask for the Radiologist on-call.  Someone is on-call 24 hrs/day.  [facility] Toll Free Number   [phone]

## 2022-03-29 ENCOUNTER — TRANSFERRED RECORDS (OUTPATIENT)
Dept: HEALTH INFORMATION MANAGEMENT | Facility: CLINIC | Age: 67
End: 2022-03-29
Payer: COMMERCIAL

## 2022-03-29 LAB — PHQ9 SCORE: 10

## 2022-04-06 ENCOUNTER — PATIENT OUTREACH (OUTPATIENT)
Dept: GERIATRIC MEDICINE | Facility: CLINIC | Age: 67
End: 2022-04-06
Payer: COMMERCIAL

## 2022-04-13 ENCOUNTER — HOSPITAL ENCOUNTER (OUTPATIENT)
Dept: WOUND CARE | Facility: CLINIC | Age: 67
Discharge: HOME | End: 2022-04-13
Attending: INTERNAL MEDICINE | Admitting: INTERNAL MEDICINE
Payer: MEDICARE

## 2022-04-13 PROCEDURE — G0463 HOSPITAL OUTPT CLINIC VISIT: HCPCS

## 2022-04-13 NOTE — PROGRESS NOTES
Reason For Visit: Holly Muir, 67 year old female, seen as outpatient to re evaluate and treat colostomy and peristomal skin concerns. Referred by Dr Tiffany King MD. Patient presents by herself today.       History: Pt had a colostomy placed in 2000 in [place].  She has had trouble for the last few months with daily leakages from her appliance causing peristomal skin denudement.  She recently started to run out of her normal valarie appliance #8588 (one piece convex cut to fit appliance with use of a barrier ring).  She had not used any stoma powder or skin prep products in the past for the peristomal skin.  She contact the Wound and Ostomy clinic a week prior to her first clinic visit with concern of being nearly out of supplies.  I provided her with [place] appliance's #8531 and Adapt 2 inch flat barrier rings #7805.      Personal/social history: Pt lives with family.       Objective:   Physical appearance: Alert and oriented  Current treatment plan: Valarie # 8588 flat cut to fit appliance with one full Adapt barrier ring #7803, see Assessment for details.  Last changed: yesterday     Below in italics is from last visit assessment, see below for details of today's visit 4/13/22.  Ostomy assessment:    Colostomy located in the left lower abdomen  Stoma is pale pink.  Measures 22 mm x 33 mm and protrudes 0.5 cm at max of the immediate surrounding skin but lays within a tissue depression so is basically flush with the abdominal tissue 1 to 3 cm from the stomas edge.  Lumen is centered.  Output large in volume, soft to slightly thicker  Peristomal skin has some areas of scattered partial thickness denudement on the edge from 12 o'clock to 5 o'clock, and some areas of hypergranular tissue on the 8 to 9 o'clock edge.      Photo from 3/16/22.  Before and after silver nitrate treatments.       Photo from 2/23/22, initial visit to the Wound and Ostomy clinic.     Dorsalis Pedal Pulse: Not assessed this  visit.  Posterior Tibial Pulse: Not assessed this visit.  Hair growth: Not assessed this visit.  Capillary Refill: Not assessed this visit.  Feet/toes color: Not assessed this visit.  Nails: Not assessed this visit.  R Leg: Edema Not assessed this visit. Ankle circumference NA cm. Calf circumference NA cm.  L Leg: Edema Not assessed this visit. Ankle circumference NA cm. Calf circumference NA cm.     Mobility: slow but independent with use of single pointed cane  Current offloading/footwear: Not assessed this visit.  Sensation: Not assessed this visit.  HgbA1C: 8.9 Date: 11/12/21  Checks Blood Glucose?:  checking 3 times daily at least, Average Readings: 300's, no change  Other callousing/areas of concern: None noted     Diet: Regular  Smoking: recent quit smoking again     Discussed: etiology of wound (NA colostomy visit), pathophysiology and patient specific goals for wound healing.   Education:  Contact information for the Wound and Ostomy clinic.  Plans for getting pt supplies as listed in plan.       Assessment:  On arrival the pt reports:  - She missed her two last visits due to transportation issues and had to reschedule at the last minute.  - The Coloplast deep convex appliance we applied at our last visit from samples received worked well and she would like to order more.  Appliance we place lasted 3 days and second sample between two and three days.  - Reports she has used up the remaining deep convex appliance she had from samples and has been using her normal [place] appliances with one full Adapt ring, these continue to leak daily.  - Her skin around the stoma she reports is intact with no pain, she last changed her appliance last evening and currently no signs of leakage are noted.  - The areas of hypergranular dots over old suture sites around the stoma that we used silver nitrate on at her last visit have all dried up, no bleeding and no pain.     Her appliance is well adhered and no new concern  noted.  Pt liked all the aspects of the Coloplast deep convex appliance we used and appears to have been a good fit.  No concerns with the Coloplast type of closure.  We did not change her appliance today.     Factors impacting wound healing:   Poor nutrition: inadequate supply of protein, carbohydrates, fatty acids, and trace elements essential for all phases of wound healing  Reduced Blood Supply: inadequate perfusion to heal wound, NA  Medication: NA  Chemotherapy: suppresses the immune system and inflammatory response, NA  Radiotherapy: increases production of free radical which damage cells, NA  Psychological stress: related to frequent ostomy appliance leakages  Obesity: decreases tissue perfusion  Infection: prolongs inflammatory phase, uses vital nutrients, impairs epithelialization and releases toxins, none noted  Underlying Disease: NA  Maceration: reduces wound tensile strength and inhibits epithelial migration, NA  Patient compliance, appears motivated to improve ostomy appliance wear times  Unrelieved pressure, NA  Immobility, NA  Substance abuse: NA     Plan:  I will help to facilitate the pt getting prescription for the deep convex Coloplast products listed below.  I will reach out to  [facility]  Divya ESPOSITO RN to find out where they are ordering supplies and if we will use Dr ROSANNE King as ordering MD  The new products the pt will need are:  - Coloplat Chip Deep Convex appliance CTF up to 33 mm #90101.  No added barrier rings to be used at this time, deep convexity will replace the rings at this time.  - Coloplast EBS strips #[id number].    We will not set up a follow up visit for the pt at this time.  If the new Coloplast supplies work well she will not need any ongoing ostomy assessment by [facility] nursing.  Instructed the pt today that her referral to see me for any ostomy concerns is good for one year from our last visit, today.  She can call with questions or make a follow up visit as  needed.     Topical care: as listed above  Offloading:  NA  Additional recommendations: none at this time     Wound Care: NA no wound cares.     Discussed plan of care with patient. Teaching done with patient for ostomy appliance changes; pt is able and willing to perform.     The following discharge instructions were reviewed with and sent home with the patient: See AVS     The following supplies were sent home with the patient:  None today  Will reassess care plan in, NA     Return visit: None schedule at this time     Verbal, written, & demonstrative education provided.  Face to face time: approximately 15 minutes.  Procedure: CATRACHO     [phone]

## 2022-04-13 NOTE — DISCHARGE INSTRUCTIONS
Today we will not change your ostomy appliance based on your last change, current intact status, and no signs of leakage currently.    I will contact Divya with [employer] for the following.  - I will give her all the product descriptions and ordering numbers for the Coloplast deep convex precut appliances we used from [facility] last time.  I will also giver her the information on the Elastic Barrier Strips, those smile shaped adhesive strips we applied around the edges of your appliance last time.  - I will get the medical supply company you are currently using from Divya also.  - I will determine who is the ordering MD for the products, likely Dr King. And have the MD fax new orders for the Coloplast products we tried and you liked.    - Follow up with me or Divya if you do not get your supplies by the end of next week.    At this time if all goes well with your ostomy with the new Coloplast products, you get better wear time and your skin stays intact and not irritated, you don't have to come back to see me.  Your referral with me is good for one year from today, 4/13/22, so any trouble in the future just call our scheduling line at [phone] to make an appointment.  IF it's over a year between visit we will need a new referral from the MD, just call them and they will enter it in the system and you can immediately then call to make an appointment and not wait back for a reply from the MD.    Good luck call with any questions [phone].    Ruben Marin RN [email or web address]

## 2022-04-15 ENCOUNTER — OFFICE VISIT (OUTPATIENT)
Dept: OTOLARYNGOLOGY | Facility: CLINIC | Age: 67
End: 2022-04-15
Payer: MEDICARE

## 2022-04-15 VITALS — DIASTOLIC BLOOD PRESSURE: 82 MMHG | SYSTOLIC BLOOD PRESSURE: 150 MMHG | HEART RATE: 73 BPM

## 2022-04-15 DIAGNOSIS — H90.3 SENSORINEURAL HEARING LOSS (SNHL) OF BOTH EARS: ICD-10-CM

## 2022-04-15 DIAGNOSIS — H81.11 BENIGN PAROXYSMAL POSITIONAL VERTIGO, RIGHT: Primary | ICD-10-CM

## 2022-04-15 PROCEDURE — 99203 OFFICE O/P NEW LOW 30 MIN: CPT | Performed by: OTOLARYNGOLOGY

## 2022-04-15 ASSESSMENT — ENCOUNTER SYMPTOMS
DIZZINESS: 1
TREMORS: 0
BRUISES/BLEEDS EASILY: 0
HEMOPTYSIS: 0
PHOTOPHOBIA: 0
TINGLING: 0
BLURRED VISION: 0
NAUSEA: 0
SPUTUM PRODUCTION: 0
HEARTBURN: 0
STRIDOR: 0
HEADACHES: 0
DOUBLE VISION: 0
CONSTITUTIONAL NEGATIVE: 1
SORE THROAT: 0
SINUS PAIN: 0
VOMITING: 0
COUGH: 0

## 2022-04-15 NOTE — PROGRESS NOTES
Chief Complaint   Patient presents with     Consult     Hearing aid clearance, ears sometimes feel like drainng, but nothing comes out.

## 2022-04-15 NOTE — LETTER
4/15/2022         RE: Holly Muir  [id number] [id number]        Dear Colleague,    Thank you for referring your patient, Holly Muir, to the [facility]. Please see a copy of my visit note below.    Chief Complaint   Patient presents with     Consult     Hearing aid clearance, ears sometimes feel like drainng, but nothing comes out.          HPI    This pleasant patient is here for hearing aid evaluation. She is hearing aid wearer. States that she feels dizzy when she turns around or rolls over in the bed for several seconds with spinning. Her balance is not good. She has a hx of falls. Denies any otalgia, otorrhea.    Pt wears hearing aids binaural for longstanding SNHL. Previous results from/01/15 revealed a mild to moderate SNHL bilaterally. Pt reports some recent episodes of vertigo and 4 falls within the past year. She reports that this is being managed by her primary care provider.  Results: Mild to moderate SNHL bilaterally. 100% word rec. bilaterally. Shallow tymps.  Rec: F/U w ENT. HA check. Trial with new hearing aids.    Review of Systems   Constitutional: Negative.    HENT: Positive for hearing loss and tinnitus. Negative for congestion, ear discharge, ear pain, nosebleeds, sinus pain and sore throat.    Eyes: Negative for blurred vision, double vision and photophobia.   Respiratory: Negative for cough, hemoptysis, sputum production and stridor.    Gastrointestinal: Negative for heartburn, nausea and vomiting.   Skin: Negative.    Neurological: Positive for dizziness. Negative for tingling, tremors and headaches.   Endo/Heme/Allergies: Negative for environmental allergies. Does not bruise/bleed easily.         Physical Exam  Vitals reviewed.   Constitutional:       Appearance: Normal appearance.   HENT:      Head: Normocephalic and atraumatic.      Right Ear: Tympanic membrane, ear canal and external ear normal. Decreased hearing noted. No  middle ear effusion. There is no impacted cerumen.      Left Ear: Tympanic membrane, ear canal and external ear normal. Decreased hearing noted.  No middle ear effusion. There is no impacted cerumen.      Nose: No mucosal edema, congestion or rhinorrhea.      Left Nostril: No epistaxis.      Right Turbinates: Not swollen.      Left Turbinates: Not swollen.      Mouth/Throat:      Mouth: Mucous membranes are moist.      Pharynx: Oropharynx is clear. Uvula midline.   Eyes:      Extraocular Movements: Extraocular movements intact.      Pupils: Pupils are equal, round, and reactive to light.   Neurological:      Mental Status: She is alert.     Right ear canal: 50% blocked with ear wax that was removed. Ear drums are intact.    A/P  This pleasant patient is having Mild to moderate SNHL bilaterally. 100% word rec. bilaterally. Shallow tymps. Rec:  HA check. Trial with new hearing aids. I will refer her to vestibular rehab for Pavan hallpike, repositioning and fall prevention strategies.            Again, thank you for allowing me to participate in the care of your patient.        Sincerely,        Rayne Gaona MD

## 2022-04-15 NOTE — NURSING NOTE
Holly Muir's chief complaint for this visit includes:  Chief Complaint   Patient presents with     Consult     Hearing aid clearance, ears sometimes feel like drainng, but nothing comes out.      PCP: Tiffany King    Referring Provider:  No referring provider defined for this encounter.    BP (!) 150/82   Pulse 73   Data Unavailable        Allergies   Allergen Reactions     Morphine Sulfate Itching         Do you need any medication refills at today's visit?

## 2022-04-15 NOTE — PROGRESS NOTES
HPI    This pleasant patient is here for hearing aid evaluation. She is hearing aid wearer. States that she feels dizzy when she turns around or rolls over in the bed for several seconds with spinning. Her balance is not good. She has a hx of falls. Denies any otalgia, otorrhea.    Pt wears hearing aids binaural for longstanding SNHL. Previous results from/01/15 revealed a mild to moderate SNHL bilaterally. Pt reports some recent episodes of vertigo and 4 falls within the past year. She reports that this is being managed by her primary care provider.  Results: Mild to moderate SNHL bilaterally. 100% word rec. bilaterally. Shallow tymps.  Rec: F/U w ENT. HA check. Trial with new hearing aids.    Review of Systems   Constitutional: Negative.    HENT: Positive for hearing loss and tinnitus. Negative for congestion, ear discharge, ear pain, nosebleeds, sinus pain and sore throat.    Eyes: Negative for blurred vision, double vision and photophobia.   Respiratory: Negative for cough, hemoptysis, sputum production and stridor.    Gastrointestinal: Negative for heartburn, nausea and vomiting.   Skin: Negative.    Neurological: Positive for dizziness. Negative for tingling, tremors and headaches.   Endo/Heme/Allergies: Negative for environmental allergies. Does not bruise/bleed easily.         Physical Exam  Vitals reviewed.   Constitutional:       Appearance: Normal appearance.   HENT:      Head: Normocephalic and atraumatic.      Right Ear: Tympanic membrane, ear canal and external ear normal. Decreased hearing noted. No middle ear effusion. There is no impacted cerumen.      Left Ear: Tympanic membrane, ear canal and external ear normal. Decreased hearing noted.  No middle ear effusion. There is no impacted cerumen.      Nose: No mucosal edema, congestion or rhinorrhea.      Left Nostril: No epistaxis.      Right Turbinates: Not swollen.      Left Turbinates: Not swollen.      Mouth/Throat:      Mouth: Mucous membranes  are moist.      Pharynx: Oropharynx is clear. Uvula midline.   Eyes:      Extraocular Movements: Extraocular movements intact.      Pupils: Pupils are equal, round, and reactive to light.   Neurological:      Mental Status: She is alert.     Right ear canal: 50% blocked with ear wax that was removed. Ear drums are intact.    A/P  This pleasant patient is having Mild to moderate SNHL bilaterally. 100% word rec. bilaterally. Shallow tymps. Rec:  HA check. Trial with new hearing aids. I will refer her to vestibular rehab for Pavan hallpike, repositioning and fall prevention strategies.

## 2022-04-19 ENCOUNTER — PATIENT OUTREACH (OUTPATIENT)
Dept: GERIATRIC MEDICINE | Facility: CLINIC | Age: 67
End: 2022-04-19
Payer: COMMERCIAL

## 2022-04-19 NOTE — PROGRESS NOTES
[facility] Care Coordination Contact  CC received notification of Emergency Room visit.  ER visit occurred on 4/17/2022  at [facility] with Dx of Hypoglycemia and a fall .    CC contacted member and reviewed discharge summary.  Member has a follow-up appointment with PCP: Yes: scheduled on She is f/u with Ortho. Appt 4/22/2022  Member has had a change in condition: No  New referrals placed: No  Home Visit Needed: No   Care plan reviewed and updated.  PCP notified of ED visit via EMR.  Reviewed how to treat low blood sugars   Divya Smalls RN,BSN  [facility] Case Coordinator   [phone]

## 2022-04-20 NOTE — PROGRESS NOTES
"  [facility]  SPORTS MEDICINE CLINIC VISIT     Apr 22, 2022      ASSESSMENT & PLAN    68 yo with increased left knee pain after fall. Suspect proximal tibial contusion. Likely exacerbation of OA as well    Reviewed imaging and assessment with patient in detail  Discussed that it is too soon for repeat injection, but can have injection in June if still painful  May consider gentle compression with ace wrap or knee sleeve for comfort  Continue daily icing  Recommended topical diclofenac  Rest knee when possible    Yousif Singh MD  [facility] SPORTS MEDICINE CLINIC [place]    -----  No chief complaint on file.      SUBJECTIVE  Holly Muir is a/an 67 year old female who is seen for follow up of left knee after a fall 4 days ago. Patient was seen in ER 4 days ago immediately following the fall where she had imaging done. Reportedly normal. Has pain in anterior knee.     The patient is seen by themselves.    Date of injury: 4/18/22  Date of Last Visit: 3/7/22. Steroid injection was given at that visit. She was doing very well until her recent fall  Symptoms: worsened  Worsened by: movement  Better with: rest/nothing  Treatment to date: rest/activity avoidance  Associated symptoms: swelling, pain, \"feels like its in the bones\"        REVIEW OF SYSTEMS:    See HPI     OBJECTIVE:  There were no vitals taken for this visit.     Patient is alert, No acute distress, pleasant and conversational.    Gait: nonantalgic. Normal heel toe gait.    Patient is able to perform two legged squat without difficulty.    left knee:   Skin intact. No erythema or ecchymosis.  No effusion or soft tissue swelling.    AROM: Zero to approximately 135  with some pain on terminal flexion. Crepitus noted.    Palpation:   TTP over the proximal medial tibia  No medial or lateral facet joint tenderness.  No posterior medial or posterior lateral joint line tenderness     Special Tests:  No ligamentous laxity or pain with valgus " or varus stress.  Negative Lachman's, Anterior Drawer and Posterior Drawer     Full Isometric quad strength, extensor mechanism in place     Neurovascularly intact in the lower extremity    Hip and Ankle with full AROM and nontender      RADIOLOGY:    Reviewed report of xrays from 4/17/2022. Images not available at the time of visit. Per report:  No fracture or malalignment. Severe patellofemoral degenerative changes. No effusion. Chronic ossicle at distal patella. Osteopenia.

## 2022-04-21 ENCOUNTER — VIRTUAL VISIT (OUTPATIENT)
Dept: FAMILY MEDICINE | Facility: CLINIC | Age: 67
End: 2022-04-21
Payer: MEDICARE

## 2022-04-21 DIAGNOSIS — E11.9 TYPE 2 DIABETES MELLITUS (H): ICD-10-CM

## 2022-04-21 DIAGNOSIS — N18.31 CHRONIC KIDNEY DISEASE, STAGE 3A (H): ICD-10-CM

## 2022-04-21 DIAGNOSIS — E87.1 HYPONATREMIA: ICD-10-CM

## 2022-04-21 DIAGNOSIS — E11.42 DIABETIC POLYNEUROPATHY ASSOCIATED WITH TYPE 2 DIABETES MELLITUS (H): ICD-10-CM

## 2022-04-21 DIAGNOSIS — S89.92XA KNEE INJURY, LEFT, INITIAL ENCOUNTER: Primary | ICD-10-CM

## 2022-04-21 PROCEDURE — 99214 OFFICE O/P EST MOD 30 MIN: CPT | Mod: 95 | Performed by: INTERNAL MEDICINE

## 2022-04-21 RX ORDER — TRAMADOL HYDROCHLORIDE 50 MG/1
50 TABLET ORAL EVERY 6 HOURS PRN
Qty: 18 TABLET | Refills: 0 | Status: SHIPPED | OUTPATIENT
Start: 2022-04-21 | End: 2022-04-24

## 2022-04-21 ASSESSMENT — PATIENT HEALTH QUESTIONNAIRE - PHQ9
SUM OF ALL RESPONSES TO PHQ QUESTIONS 1-9: 14
SUM OF ALL RESPONSES TO PHQ QUESTIONS 1-9: 14
10. IF YOU CHECKED OFF ANY PROBLEMS, HOW DIFFICULT HAVE THESE PROBLEMS MADE IT FOR YOU TO DO YOUR WORK, TAKE CARE OF THINGS AT HOME, OR GET ALONG WITH OTHER PEOPLE: EXTREMELY DIFFICULT

## 2022-04-21 NOTE — PROGRESS NOTES
Holly is a 67 year old who is being evaluated via a billable telephone visit.      What phone number would you like to be contacted at? [phone]  How would you like to obtain your AVS? Mail a copy    Assessment & Plan     Holly was seen today for lab result notice and pain.    Diagnoses and all orders for this visit:    Knee injury, left, initial encounter  -     traMADol (ULTRAM) 50 MG tablet; Take 1 tablet (50 mg) by mouth every 6 hours as needed for severe pain    Chronic kidney disease, stage 3a (H)  -     Cancel: Hemoglobin; Future  -     CBC with platelets; Future    Diabetic polyneuropathy associated with type 2 diabetes mellitus (H)  -     HEMOGLOBIN A1C; Future  -     traMADol (ULTRAM) 50 MG tablet; Take 1 tablet (50 mg) by mouth every 6 hours as needed for severe pain    Hyponatremia  -     Basic metabolic panel  (Ca, Cl, CO2, Creat, Gluc, K, Na, BUN); Future       Due to CKD unable to take NSAIDs.  She has not been using tramadol on a regular basis.  I recommended use tramadol on a as needed basis.  Short-term refill given for now.  Follow-up in 1 month to reassess need for chronic use.    She is due for few monitoring labs.  To get these scheduled    Return in about 1 month (around 5/24/2022) for Virtual visit, Pain management.    Tiffany King MD PhD  [facility]   Holly is a 67 year old who presents for the following health issues     Pt had a fall Sunday before Easter, evaluated in ED. Didn't break anything. Since the fall, her left leg is hurting more. The bone in the front, the knee cap. She can't walk on it.     She is seeing her arthritis doctor tomorrow to check onthis.     History of Present Illness       Mental Health Follow-up:                    Today's PHQ-9         PHQ-9 Total Score: 14  PHQ-9 Q9 Thoughts of better off dead/self-harm past 2 weeks :   (P) Not at all    How difficult have these problems made it for you to do your work, take care of things  at home, or get along with other people: Extremely difficult        Reason for visit:  Lab fu    She eats 2-3 servings of fruits and vegetables daily.She consumes 4 sweetened beverage(s) daily.She exercises with enough effort to increase her heart rate 9 or less minutes per day.  She exercises with enough effort to increase her heart rate 3 or less days per week.   She is taking medications regularly.       Lab Follow-up   Pt is hoping to get a refill on tramadol pain has been staying the same.   She used to get tramadol 50 mg tid, 90 pills per months. Her last refill for this was 11/6/2021. She went to [place]. She reported she used up tramadol and was not able to refill in [place]. Since returning, she has not asked for refill until now.   She did get a small prescription from Dr. Santy Harrington in Sports medicine on 3/9/2022, 18 tablets for 5 days. She reported she used this up now.     Since the fall, the knee pain has gotten worse. She will be seeing her RA doctor tomorrow to check on this.     Review of Systems   Constitutional, HEENT, cardiovascular, pulmonary, gi and gu systems are negative, except as otherwise noted.      Objective           Vitals:  No vitals were obtained today due to virtual visit.    Physical Exam   healthy, alert and no distress  PSYCH: Alert and oriented times 3; coherent speech, normal   rate and volume, able to articulate logical thoughts, able   to abstract reason, no tangential thoughts, no hallucinations   or delusions  Her affect is normal  RESP: No cough, no audible wheezing, able to talk in full sentences  Remainder of exam unable to be completed due to telephone visits    Phone call duration: 17 minutes

## 2022-04-22 ENCOUNTER — OFFICE VISIT (OUTPATIENT)
Dept: ORTHOPEDICS | Facility: CLINIC | Age: 67
End: 2022-04-22
Payer: MEDICARE

## 2022-04-22 DIAGNOSIS — E87.1 HYPONATREMIA: ICD-10-CM

## 2022-04-22 DIAGNOSIS — M25.562 ACUTE PAIN OF LEFT KNEE: Primary | ICD-10-CM

## 2022-04-22 DIAGNOSIS — N18.31 CHRONIC KIDNEY DISEASE, STAGE 3A (H): ICD-10-CM

## 2022-04-22 DIAGNOSIS — M25.561 ARTHRALGIA OF BOTH KNEES: ICD-10-CM

## 2022-04-22 DIAGNOSIS — E11.42 DIABETIC POLYNEUROPATHY ASSOCIATED WITH TYPE 2 DIABETES MELLITUS (H): ICD-10-CM

## 2022-04-22 DIAGNOSIS — M25.562 ARTHRALGIA OF BOTH KNEES: ICD-10-CM

## 2022-04-22 LAB
ANION GAP SERPL CALCULATED.3IONS-SCNC: 7 MMOL/L (ref 3–14)
BUN SERPL-MCNC: 12 MG/DL (ref 7–30)
CALCIUM SERPL-MCNC: 9.2 MG/DL (ref 8.5–10.1)
CHLORIDE BLD-SCNC: 98 MMOL/L (ref 94–109)
CO2 SERPL-SCNC: 27 MMOL/L (ref 20–32)
CREAT SERPL-MCNC: 0.8 MG/DL (ref 0.52–1.04)
ERYTHROCYTE [DISTWIDTH] IN BLOOD BY AUTOMATED COUNT: 14.6 % (ref 10–15)
GFR SERPL CREATININE-BSD FRML MDRD: 81 ML/MIN/1.73M2
GLUCOSE BLD-MCNC: 321 MG/DL (ref 70–99)
HBA1C MFR BLD: 10.1 % (ref 0–5.6)
HCT VFR BLD AUTO: 34.9 % (ref 35–47)
HGB BLD-MCNC: 11.3 G/DL (ref 11.7–15.7)
MCH RBC QN AUTO: 28.6 PG (ref 26.5–33)
MCHC RBC AUTO-ENTMCNC: 32.4 G/DL (ref 31.5–36.5)
MCV RBC AUTO: 88 FL (ref 78–100)
PLATELET # BLD AUTO: 334 10E3/UL (ref 150–450)
POTASSIUM BLD-SCNC: 4.3 MMOL/L (ref 3.4–5.3)
RBC # BLD AUTO: 3.95 10E6/UL (ref 3.8–5.2)
SODIUM SERPL-SCNC: 132 MMOL/L (ref 133–144)
WBC # BLD AUTO: 7.4 10E3/UL (ref 4–11)

## 2022-04-22 PROCEDURE — 83036 HEMOGLOBIN GLYCOSYLATED A1C: CPT | Performed by: FAMILY MEDICINE

## 2022-04-22 PROCEDURE — 80048 BASIC METABOLIC PNL TOTAL CA: CPT | Performed by: FAMILY MEDICINE

## 2022-04-22 PROCEDURE — 36415 COLL VENOUS BLD VENIPUNCTURE: CPT | Performed by: FAMILY MEDICINE

## 2022-04-22 PROCEDURE — 99213 OFFICE O/P EST LOW 20 MIN: CPT | Performed by: FAMILY MEDICINE

## 2022-04-22 PROCEDURE — 85027 COMPLETE CBC AUTOMATED: CPT | Performed by: FAMILY MEDICINE

## 2022-04-22 RX ORDER — PSEUDOEPHED/ACETAMINOPH/DIPHEN 30MG-500MG
TABLET ORAL
Qty: 180 TABLET | Refills: 1 | Status: SHIPPED | OUTPATIENT
Start: 2022-04-22 | End: 2022-08-23

## 2022-04-22 RX ORDER — BLOOD SUGAR DIAGNOSTIC
STRIP MISCELLANEOUS
Qty: 400 STRIP | Refills: 3 | Status: SHIPPED | OUTPATIENT
Start: 2022-04-22 | End: 2022-06-03

## 2022-04-22 ASSESSMENT — PATIENT HEALTH QUESTIONNAIRE - PHQ9: SUM OF ALL RESPONSES TO PHQ QUESTIONS 1-9: 14

## 2022-04-22 NOTE — TELEPHONE ENCOUNTER
Test strips    11/12/2021  [facility]     Elizabeth Medrano MD    Endocrinology, Diabetes, and Metabolism

## 2022-04-22 NOTE — LETTER
"    4/22/2022         RE: Holly Muir  [id number] [id number]        Dear Colleague,    Thank you for referring your patient, Holly Muir, to the [facility] SPORTS MEDICINE CLINIC [place]. Please see a copy of my visit note below.      [facility]  SPORTS MEDICINE CLINIC VISIT     Apr 22, 2022      ASSESSMENT & PLAN    66 yo with increased left knee pain after fall. Suspect proximal tibial contusion. Likely exacerbation of OA as well    Reviewed imaging and assessment with patient in detail  Discussed that it is too soon for repeat injection, but can have injection in June if still painful  May consider gentle compression with ace wrap or knee sleeve for comfort  Continue daily icing  Recommended topical diclofenac  Rest knee when possible    Yousif Singh MD  [facility] SPORTS MEDICINE CLINIC [place]    -----  No chief complaint on file.      SUBJECTIVE  Holly Muir is a/an 67 year old female who is seen for follow up of left knee after a fall 4 days ago. Patient was seen in ER 4 days ago immediately following the fall where she had imaging done. Reportedly normal. Has pain in anterior knee.     The patient is seen by themselves.    Date of injury: 4/18/22  Date of Last Visit: 3/7/22. Steroid injection was given at that visit. She was doing very well until her recent fall  Symptoms: worsened  Worsened by: movement  Better with: rest/nothing  Treatment to date: rest/activity avoidance  Associated symptoms: swelling, pain, \"feels like its in the bones\"        REVIEW OF SYSTEMS:    See HPI     OBJECTIVE:  There were no vitals taken for this visit.     Patient is alert, No acute distress, pleasant and conversational.    Gait: nonantalgic. Normal heel toe gait.    Patient is able to perform two legged squat without difficulty.    left knee:   Skin intact. No erythema or ecchymosis.  No effusion or soft tissue swelling.    AROM: Zero to approximately " 135  with some pain on terminal flexion. Crepitus noted.    Palpation:   TTP over the proximal medial tibia  No medial or lateral facet joint tenderness.  No posterior medial or posterior lateral joint line tenderness     Special Tests:  No ligamentous laxity or pain with valgus or varus stress.  Negative Lachman's, Anterior Drawer and Posterior Drawer     Full Isometric quad strength, extensor mechanism in place     Neurovascularly intact in the lower extremity    Hip and Ankle with full AROM and nontender      RADIOLOGY:    Reviewed report of xrays from 4/17/2022. Images not available at the time of visit. Per report:  No fracture or malalignment. Severe patellofemoral degenerative changes. No effusion. Chronic ossicle at distal patella. Osteopenia.            Again, thank you for allowing me to participate in the care of your patient.        Sincerely,        Yousif Singh MD

## 2022-04-26 ENCOUNTER — OFFICE VISIT (OUTPATIENT)
Dept: OPHTHALMOLOGY | Facility: CLINIC | Age: 67
End: 2022-04-26
Attending: OPTOMETRIST
Payer: MEDICARE

## 2022-04-26 DIAGNOSIS — E11.3293 MILD NONPROLIFERATIVE DIABETIC RETINOPATHY OF BOTH EYES ASSOCIATED WITH TYPE 2 DIABETES MELLITUS, MACULAR EDEMA PRESENCE UNSPECIFIED (H): ICD-10-CM

## 2022-04-26 DIAGNOSIS — H26.493 POSTERIOR CAPSULAR OPACIFICATION VISUALLY SIGNIFICANT OF BOTH EYES: ICD-10-CM

## 2022-04-26 DIAGNOSIS — Z96.1 PSEUDOPHAKIA OF BOTH EYES: Primary | ICD-10-CM

## 2022-04-26 PROCEDURE — G0463 HOSPITAL OUTPT CLINIC VISIT: HCPCS

## 2022-04-26 PROCEDURE — 66821 AFTER CATARACT LASER SURGERY: CPT | Mod: LT | Performed by: OPHTHALMOLOGY

## 2022-04-26 PROCEDURE — 99214 OFFICE O/P EST MOD 30 MIN: CPT | Mod: 25 | Performed by: OPHTHALMOLOGY

## 2022-04-26 ASSESSMENT — SLIT LAMP EXAM - LIDS
COMMENTS: NORMAL
COMMENTS: NORMAL

## 2022-04-26 ASSESSMENT — CUP TO DISC RATIO
OD_RATIO: 0.2
OS_RATIO: 0.2

## 2022-04-26 ASSESSMENT — VISUAL ACUITY
CORRECTION_TYPE: GLASSES
METHOD: SNELLEN - LINEAR
OS_PH_CC: 20/30
OS_PH_CC+: +2
OD_PH_CC+: -2
OS_CC: 20/30
OD_CC: 20/30
OD_PH_CC: 20/25

## 2022-04-26 ASSESSMENT — REFRACTION_WEARINGRX
OD_HBASE: IN
OS_AXIS: 129
OD_SPHERE: -1.00
OD_CYLINDER: +2.00
OD_HPRISM: 2.5
OS_CYLINDER: +0.50
SPECS_TYPE: BIFOCAL
OS_ADD: +2.75
OS_HBASE: IN
OS_SPHERE: -0.25
OS_HPRISM: 2.5
OD_ADD: +2.75
OD_AXIS: 034

## 2022-04-26 ASSESSMENT — CONF VISUAL FIELD
METHOD: COUNTING FINGERS
OS_NORMAL: 1
OD_NORMAL: 1

## 2022-04-26 ASSESSMENT — TONOMETRY
IOP_METHOD: TONOPEN
OD_IOP_MMHG: 17
OS_IOP_MMHG: 14

## 2022-04-26 ASSESSMENT — EXTERNAL EXAM - LEFT EYE: OS_EXAM: NORMAL

## 2022-04-26 ASSESSMENT — EXTERNAL EXAM - RIGHT EYE: OD_EXAM: NORMAL

## 2022-04-26 NOTE — NURSING NOTE
Chief Complaints and History of Present Illnesses   Patient presents with     Yag Laser     YAG eval both eyes     Chief Complaint(s) and History of Present Illness(es)     Yag Laser     Comments: YAG eval both eyes              Comments     Pt states vision appears blurry in both eyes. No eye pain today. Occasional floaters in BE that come and go, no changes.  Pt notes occasional flashes inside of the floaters that she sees.   DM2 BS: 141 yesterday morning per pt.  Lab Results       Component                Value               Date                       A1C                      10.1                04/22/2022                 A1C                      8.9                 11/12/2021                 A1C                      10.5                10/08/2021                 A1C                      7.7                 01/06/2021                 A1C                      7.9                 12/11/2020                 A1C                      10.6                08/24/2020                 A1C                      9.5                 06/18/2020              CHACHA Sawyer April 26, 2022 9:57 AM

## 2022-04-26 NOTE — PROGRESS NOTES
HPI:  Holly Muir is a 67 year old female presenting for YAG evaluation.    Saw Dr. Martinez 3/4/22 at [place] for diabetic eye exam with mild NPDR. Noted to have 2+ PCO each eye with symptomatic glare, referred for YAG evaluation.    Has been having more glare, difficulty with headlights. Started a little while after cataract surgery. Has been in both eyes, left eye is worse. No eye pain. stable old floaters. Will sometimes see little lights in the periphery - difficult to explain, points around that she sees little points of light sometimes. Not flashes. This has been ongoing for months and unchanged.    Past Ocular History:  Pseudophakia each eye  PCO each eye  Mild NPDR  Diplopia corrected with prisms    PMH:  DM2    SH:  Former smoker, quit 2016    FH:  No known family history of blindness, glaucoma, macular degeneration    ASSESSMENT and PLAN:  1. Posterior capsular opacification visually significant of both eyes  - noticing significant glare in left>right eye that interferes with nighttime driving and has been gradually worsening since months after cataract surgery  - discussed r/b/a of YAG capsulotomy including risk of floaters, blurred vision, need for touch up/repeat laser, change in IOP. She understands and would like to proceed with YAG capsulotomy left eye today --> YAG capsulotomy left eye completed without complication 4/26/22    2. Pseudophakia of both eyes  - lenses in good position    3. Mild nonproliferative diabetic retinopathy of both eyes associated with type 2 diabetes mellitus, macular edema presence unspecified (H)  - last A1c 10.1 on 4/22/22, increased from 8.9 in Nov 2021 and 7.7 in Jan 2021  - had diabetic eye exam with Dr. Martinez 3/2022  - again encouraged BS/BP control  - annual DFE      Follow up in 2 weeks with DFE each eye, likely YAG capsulotomy OD or sooner PRN        -----------------------------------------------------------------------------------    Attestation:  Complete  documentation of historical and exam elements from today's encounter can be found in the full encounter summary report (not reduplicated in this progress note). I personally obtained the chief complaint(s) and history of present illness.  I confirmed and edited as necessary the review of systems, past medical/surgical history, family history, social history, and examination findings as documented by others; and I examined the patient myself. I personally reviewed the relevant tests, images, and reports as documented above.     I formulated and edited as necessary the assessment and plan and discussed the findings and management plan with the patient and family.      Sofia Morales MD

## 2022-05-03 ENCOUNTER — TELEPHONE (OUTPATIENT)
Dept: WOUND CARE | Facility: CLINIC | Age: 67
End: 2022-05-03
Payer: COMMERCIAL

## 2022-05-03 NOTE — TELEPHONE ENCOUNTER
Returned pt's call and spoke with her.  Informed her I will have to follow up with the MD and the supply company to see where the delay is occurring as products should have sent out by now.  Will plan to call pt again tomorrow before noon for an update.     Ruben Marin RN [email or web address]

## 2022-05-03 NOTE — TELEPHONE ENCOUNTER
Reason for Call:  Other call back    Detailed comments: pt calling because she still has not received her colostomy bags. Please call pt back to discuss Thank you    Phone Number Patient can be reached at: Home number on file [phone] (home)    Best Time: any    Can we leave a detailed message on this number? YES    Call taken on 5/3/2022 at 11:04 AM by Ericka Cruz

## 2022-05-04 ENCOUNTER — MEDICAL CORRESPONDENCE (OUTPATIENT)
Dept: HEALTH INFORMATION MANAGEMENT | Facility: CLINIC | Age: 67
End: 2022-05-04
Payer: COMMERCIAL

## 2022-05-05 NOTE — RESULT ENCOUNTER NOTE
Dear Holly,   Your recent test results showed the following:  -- your labs were stable. Sodium improved slightly.   -- mild anemia is from chronic kidney disease.   -- A1c remains high, continue to follow up with endocrine.     Please call or Mychart to our office if you have further questions.     Tiffany King MD-PhD

## 2022-05-09 ENCOUNTER — OFFICE VISIT (OUTPATIENT)
Dept: OPHTHALMOLOGY | Facility: CLINIC | Age: 67
End: 2022-05-09
Attending: OPHTHALMOLOGY
Payer: MEDICARE

## 2022-05-09 DIAGNOSIS — E11.3293 MILD NONPROLIFERATIVE DIABETIC RETINOPATHY OF BOTH EYES ASSOCIATED WITH TYPE 2 DIABETES MELLITUS, MACULAR EDEMA PRESENCE UNSPECIFIED (H): ICD-10-CM

## 2022-05-09 DIAGNOSIS — Z96.1 PSEUDOPHAKIA OF BOTH EYES: ICD-10-CM

## 2022-05-09 DIAGNOSIS — H26.493 POSTERIOR CAPSULAR OPACIFICATION VISUALLY SIGNIFICANT OF BOTH EYES: Primary | ICD-10-CM

## 2022-05-09 PROCEDURE — 99024 POSTOP FOLLOW-UP VISIT: CPT | Performed by: OPHTHALMOLOGY

## 2022-05-09 PROCEDURE — G0463 HOSPITAL OUTPT CLINIC VISIT: HCPCS

## 2022-05-09 PROCEDURE — 66821 AFTER CATARACT LASER SURGERY: CPT | Mod: RT | Performed by: OPHTHALMOLOGY

## 2022-05-09 ASSESSMENT — REFRACTION_WEARINGRX
SPECS_TYPE: BIFOCAL
OD_ADD: +2.75
OS_AXIS: 129
OD_SPHERE: -1.00
OD_CYLINDER: +2.00
OS_HPRISM: 2.5
OD_HBASE: IN
OS_HBASE: IN
OS_SPHERE: -0.25
OD_HPRISM: 2.5
OD_AXIS: 034
OS_ADD: +2.75
OS_CYLINDER: +0.50

## 2022-05-09 ASSESSMENT — CONF VISUAL FIELD
OD_NORMAL: 1
OS_NORMAL: 1
METHOD: COUNTING FINGERS

## 2022-05-09 ASSESSMENT — VISUAL ACUITY
OD_PH_CC: 20/25
OD_CC: 20/30
OS_CC+: -3
CORRECTION_TYPE: GLASSES
OD_CC+: +2
METHOD: SNELLEN - LINEAR
OS_CC: 20/25

## 2022-05-09 ASSESSMENT — SLIT LAMP EXAM - LIDS
COMMENTS: NORMAL
COMMENTS: NORMAL

## 2022-05-09 ASSESSMENT — CUP TO DISC RATIO
OD_RATIO: 0.2
OS_RATIO: 0.2

## 2022-05-09 ASSESSMENT — EXTERNAL EXAM - RIGHT EYE: OD_EXAM: NORMAL

## 2022-05-09 ASSESSMENT — TONOMETRY
OD_IOP_MMHG: 14
OS_IOP_MMHG: 12
IOP_METHOD: TONOPEN

## 2022-05-09 ASSESSMENT — EXTERNAL EXAM - LEFT EYE: OS_EXAM: NORMAL

## 2022-05-09 NOTE — NURSING NOTE
Chief Complaints and History of Present Illnesses   Patient presents with     YAG evaluation     Follow up YAG evaluation right eye       Follow Up     Follow up  YAG left eye, possible YAG right eye today.  Vision has improved left eye.  Eye meds: AT's each eye   Ruth ColesVHX 5/9/2022 10:07 AM        Chief Complaint(s) and History of Present Illness(es)     YAG evaluation     Comments: Follow up YAG evaluation right eye                Follow Up     Laterality: right eye    Associated symptoms: Negative for dryness, eye pain, redness, tearing, flashes and floaters    Pain scale: 0/10    Comments: Follow up  YAG left eye, possible YAG right eye today.  Vision has improved left eye.  Eye meds: AT's each eye   Ruth Kovacs CO 5/9/2022 10:07 AM

## 2022-05-09 NOTE — PROGRESS NOTES
Outcome for 05/09/22 1:56 PM: Patient declined to share glucose readings prefers to have stan uploaded at the clinic.  Yajaira Rajan [facility]  Adult Endocrinology  [facility]  Outcome for 05/13/22 10:47 AM: Data obtained via phone and located below  SIENA Pike    5/12: Evening 270,   5/11: Evening 131, afternoon 81,   5/10: afternoon 323,   5/9: evening 283, afternoon 176, AM 85  5/8: evening 54, afternoon 242,   5/7: evening 264, afternoon 106,   5/6: evening 239,   5/5: evening 128, afternoon 138,   5/4: evening 276, afternoon 68, AM 55  5/3: evening 193, afternoon 129, AM 72  5/2: evening 140, afternoon 232,   5/1: evening 446, afternoon 384,   4/20: evening 156, afternoon 73, AM 94  4/29: evening 80, afternoon 295,       Holly is a 67 year old who is being evaluated via a billable telephone visit.      What phone number would you like to be contacted at? [phone]   How would you like to obtain your AVS? StepOut  Phone call duration: 25 minutes    Pt states there are no changes to their allergy and medication list since last reviewed on 5/9/22.    SIENA Pike                                                          - Endocrinology Follow up -    Reason for visit/consult:  Uncontrolled type 2 diabetes mellitus with hyperglycemia, with long-term current use of insulin (H)    Primary care provider: Tiffany King      Assessment and Plan    # DM2, night time eating habits  A1c 8.9, improved from 10.5 over 1 month, by reviewing glucometer average glucose 156 past 1 month.  She mentioned occasionally lower side of glucose am,     - Reduce basaglar to 45 units daily since she has been experiencing occasional hypoglycemia day/night both    - compliance issue due to depression  We will try to hold novolog and add Glimepiride 2 mg daily       -Continue current metformin 1000 mg bid  - Continue current Actos 45 mg, body weight stable.       #  DM education  Her daughter wants to know more about diabetic foods.   They will make appt with Mikayla.     # Mental health/Depression      # DM complication  Urine microalbumin due (ordered)    -RTC with Mikayla in 2 month and me in 6 months         35  minutes spent on the date of the encounter doing chart review, history and exam, documentation and further activities as noted above.    Elizabeth Medrano MD  Staff Physician  Endocrinology and Metabolism  [facility]  License: [id number]  Pager: [phone]      Current Diabetic Regimens:  Basaglar 45 units daily  NovoLog 6-12 units each meal (hold now as of 5/2022 due to compliance/depression)  Metformin 1000 twice daily  Actos 45 mg daily  Glimepiride 2 mg daily (5/2022-new)    Life Style:  Wake up 10-11 am  Breakfast after  Lunch 1-2 pm: sandwich  Dinner 6 pm: mexican fat foods  Bedtime: 9 pm  3 am : wake up and snacks    Exercise:  walking    DM complications:  Retinopathy: due not been for 1-2 years.   Nephropathy: urine microalbumin negative 8/2018 - due  Neuropathy:   Most recent LDL:   LDL Cholesterol Calculated   Date Value Ref Range Status   10/08/2021 39 <=100 mg/dL Final   06/18/2020 26 <100 mg/dL Final     Comment:     Desirable:       <100 mg/dl       Glucose Log: fluctuate from 55 - 400.     Interval History as of 5/13/2022 : Patient has been doing ok, depression fluctuates. She forgets insulin.   Interval History as of 11/12/2021 : Patient has been doing better, back to compliant to medication . New event includes : A1c increased 10.5 while she was in [place] and not better .  Interval History as of 12/11/2020 : Patient has been doing well.A1c 7.9 today (12/11/2020), her glucose improved significantly, came with her daughter today and she is watching mother's diet.   Interval History as of 6/5/2020 : Fluctuating glucose, lots fast foods nowadays. Medication compliance good .  Interval History as of 9/20/2019 : Follow-up in 4 months,  compliant medications.  However she still have nighttime snacking habit every night, when she eat carbohydrate snacks glucose in the morning was 250s.  Light fruits 160-180s.  Patient mentioned depression was worse spring 2019 but currently relatively okay and she has psychiatrist to follow-up.   Interval History as of 5/2019: Patient came by herself she missed appointment, increasing the dose of insulin, mentioned started to have a glucose increase 300 in the fasting in the morning her mental issue.  Seen by new psychiatrist. Visitign nurse once a week.    HPI: A 61 yo female second follow up DM2, she has remote history of colectomy. This is the third visit.   Used to use V go for several month, she was confused the system, worsened A1C, then we switch back to insulin injection regimens.    Currently lantus 38 utnis and novology 6-8-8 and sliding scale and metformin 1000 bid.   Last visit prscribed actos 30 mg daily, however she mentioned, she is not sure which one is actos and she may not taking actos.      Of note,  she has had sleeping issues, which distracts her life significantly. She cannot sleep at night, usually sleeps in the day, during the night, she has been hungry and eating peanuts butters, jelly, fruits etc.   Sometimes she cannot sleep for 2 days.   She has visiting nurse once a week, yesterday the nurse called our clinic that her glucose was 500. Talked with Mikayla, lantus was increased from 38 to 42 units.     Glucose: she forgot to bring glucometer today.     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     Past Surgical History:   Procedure Laterality Date     ARTHROSCOPY KNEE RT/LT      LT     BLEPHAROPLASTY BILATERAL Bilateral 8/16/2019    Procedure: BILATERAL UPPER LID BLEPHAROPLASTY;  Surgeon: Randolph Cook MD;  Location: [facility] OR     BREAST LUMPECTOMY, RT/LT      LT-benign      CATARACT IOL, RT/LT       COLONOSCOPY  5/10/2012    Procedure:COLONOSCOPY; screening colonoscopy; Surgeon:MILLA VEGA; Location:[facility] OR     COLONOSCOPY WITH CO2 INSUFFLATION N/A 8/2/2019    Procedure: Colonoscopy with CO2 insufflation;  Surgeon: Himanshu Hi MD;  Location: [facility] OR     COLOSTOMY  2000     COLOSTOMY       HYSTERECTOMY, PAP NO LONGER INDICATED      done in [place]-cervical cancer     IMPLANT STIMULATOR SACRAL NERVE STAGE ONE Right 3/10/2015    Procedure: IMPLANT STIMULATOR SACRAL NERVE STAGE ONE;  Surgeon: Teodora Yusuf MD;  [facility]: [facility]     IMPLANT STIMULATOR SACRAL NERVE STAGE TWO Right 3/31/2015    Procedure: IMPLANT STIMULATOR SACRAL NERVE STAGE TWO;  Surgeon: Teodora Yusuf MD;  [facility]: [facility]     IMPLANT STIMULATOR SACRAL NERVE STAGE TWO Right 6/22/2020    Procedure: INSERTION, SACRAL NERVE STIMULATOR, STAGE 2 (replacement of interstim battery);  Surgeon: Teodora Yusuf MD;  Location: [facility]     PHACOEMULSIFICATION WITH STANDARD INTRAOCULAR LENS IMPLANT Left 9/26/2019    Procedure: LEFT PHACOEMULSIFICATION, CATARACT, WITH STANDARD IOL INSERTION;  Surgeon: Francesco Winn MD;  Location: [facility]     PHACOEMULSIFICATION WITH STANDARD INTRAOCULAR LENS IMPLANT Right 10/24/2019    Procedure: RIGHT PHACOEMULSIFICATION, CATARACT, WITH STANDARD IOL INSERTION;  Surgeon: Francesco Winn MD;  Location: [facility]     REPAIR PTOSIS Bilateral 08/16/2019     SALPINGO OOPHORECTOMY,R/L/JENNIFER      Salpingo Oophorectomy, RT/LT/JENNIFER     ZZC EXCIS PTERYGIUM  10/08    [facility]     [facility] STOMACH SURG[facility] PROCED[facility] UNLISTED       [facility]  2002    normal per report-no records available-records destroyed       Allergies:  Allergies   Allergen Reactions     Morphine Sulfate Itching       Current Medications   Current Outpatient Medications   Medication     ACETAMINOPHEN EXTRA STRENGTH 500 MG tablet     ARIPiprazole (ABILIFY) 30 MG tablet     ARIPiprazole (ABILIFY) 5 MG tablet     ASPIRIN LOW DOSE 81 MG chewable tablet     blood glucose (ACCU-CHEK CELIA PLUS) test strip     blood glucose monitoring (ACCU-CHEK FASTCLIX) lancets     buPROPion (WELLBUTRIN XL) 150 MG 24 hr tablet     buPROPion (WELLBUTRIN XL) 300 MG 24 hr tablet     busPIRone HCl (BUSPAR) 30 MG tablet     cetirizine (ZYRTEC) 10 MG tablet     Cholecalciferol (VITAMIN D3) 25 MCG TABS     ferrous gluconate (FERGON) 324 (38 Fe) MG tablet     gabapentin (NEURONTIN) 600 MG tablet     ibuprofen (ADVIL/MOTRIN) 400 MG tablet     insulin aspart (NOVOLOG FLEXPEN) 100 UNIT/ML pen     insulin glargine (BASAGLAR KWIKPEN) 100 UNIT/ML pen     insulin pen needle (31G X 5 MM) 31G X 5 MM miscellaneous     metFORMIN (GLUCOPHAGE) 1000 MG tablet     Multiple Vitamin (DAILY-ISAAC MULTIVITAMIN) TABS     nystatin (MYCOSTATIN) 326619 UNIT/GM external powder     omeprazole (PRILOSEC) 20 MG DR capsule     order for DME     order for DME     order for DME     order for DME     ORDER FOR DME     ORDER FOR DME     PARoxetine (PAXIL) 20 MG tablet     pioglitazone (ACTOS) 45 MG tablet     propranolol ER (INDERAL LA) 80 MG 24 hr capsule     ramipril (ALTACE) 5 MG capsule     rOPINIRole (REQUIP) 0.25 MG tablet     simvastatin (ZOCOR) 20 MG tablet     traMADol (ULTRAM) 50 MG tablet     traZODone (DESYREL) 50 MG tablet     UNIVERSAL REMOVER WIPES EX MISC     vitamin C (CVS VITAMIN C) 500 MG tablet     VITAMIN D3 50 MCG (2000 UT) tablet     Continuous Blood Gluc  (FREESTYLE CM 14 DAY READER) GALINA     diclofenac (VOLTAREN) 50 MG EC tablet     Current Facility-Administered Medications   Medication      triamcinolone (KENALOG-40) injection 40 mg     triamcinolone (KENALOG-40) injection 40 mg     triamcinolone (KENALOG-40) injection 40 mg     triamcinolone (KENALOG-40) injection 40 mg     triamcinolone (KENALOG-40) injection 40 mg     triamcinolone (KENALOG-40) injection 40 mg     triamcinolone (KENALOG-40) injection 40 mg     triamcinolone (KENALOG-40) injection 40 mg     triamcinolone (KENALOG-40) injection 40 mg     triamcinolone (KENALOG-40) injection 40 mg     triamcinolone (KENALOG-40) injection 40 mg     triamcinolone (KENALOG-40) injection 40 mg     triamcinolone (KENALOG-40) injection 40 mg       Family History:  Family History   Problem Relation Age of Onset     Diabetes Mother      Cerebrovascular Disease Mother      Diabetes Father      Hypertension Father      Cancer Maternal Grandfather      Cancer Paternal Grandfather      Diabetes Brother      Diabetes Sister      Thyroid Disease Daughter      Thyroid Disease Sister      Multiple Sclerosis Daughter      Glaucoma No family hx of      Macular Degeneration No family hx of        Social History:  Social History     Tobacco Use     Smoking status: Former Smoker     Packs/day: 0.00     Quit date: 2016     Years since quittin.7     Smokeless tobacco: Never Used   Substance Use Topics     Alcohol use: Yes     Alcohol/week: 0.0 standard drinks     Comment: social occasions   taking care of 11 and 18 year grand children and live [facility].     ROS:  Full review of systems taken with the help of the intake sheet. Otherwise a complete 14 point review of systems was taken and is negative unless stated in the history above.    Physical Exam:   not currently breastfeeding.  General: well appearing, no acute distress, pleasant and conversant,   Mental Status/neuro: alert and oriented  Face: symmetrical, normal facial color  Eyes: anicteric, no proptosis or lid lag  Resp: no acute distress        Labs (General):      Ref. Range 2020 14:23 2020  00:00 1/6/2021 09:32 10/8/2021 09:22 11/12/2021 00:00   Hemoglobin A1C Latest Ref Range: 0.0 - 5.7 % 10.6 (H) 7.9 (A) 7.7 (H) 10.5 (H) 8.9 (A)

## 2022-05-09 NOTE — PROGRESS NOTES
HPI:  Holly Muir is a 67 year old female presenting for follow up left eye YAG and right eye YAG evaluation.    Last visit 4/26/22 had left eye YAG. Now notes vision clear again left eye. No floaters. No eye pain. Right eye still foggy. Feels like eyes were crossing before and this is now better.    Using - ATs prn    Past Ocular History:  Pseudophakia each eye  PCO each eye s/p YAG left eye 4/26/22  Mild NPDR  Diplopia corrected with prisms    PMH:  DM2    SH:  Former smoker, quit 2016    FH:  No known family history of blindness, glaucoma, macular degeneration    ASSESSMENT and PLAN:  1. Posterior capsular opacification visually significant of both eyes  - noticing significant glare in left>right eye that interferes with nighttime driving and has been gradually worsening since months after cataract surgery  - s/p left eye YAG 4/26/22 with open PC centrally with inferior flap and resolution of symptoms; retina attached  - discussed r/b/a of YAG capsulotomy including risk of floaters, blurred vision, need for touch up/repeat laser, change in IOP. She understands and would like to proceed with YAG capsulotomy right eye today -->  YAG capsulotomy right eye 5/9/22 completed without complications    2. Pseudophakia of both eyes  - lenses in good position    3. Mild nonproliferative diabetic retinopathy of both eyes associated with type 2 diabetes mellitus, macular edema presence unspecified (H)  - last A1c 10.1 on 4/22/22, increased from 8.9 in Nov 2021 and 7.7 in Jan 2021  - had diabetic eye exam with Dr. Martinez 3/2022  - again encouraged BS/BP control  - annual DFE      Follow up in 2 weeks with DFE right eye or sooner PRN        -----------------------------------------------------------------------------------    Attestation:  Complete documentation of historical and exam elements from today's encounter can be found in the full encounter summary report (not reduplicated in this progress note). I personally  obtained the chief complaint(s) and history of present illness.  I confirmed and edited as necessary the review of systems, past medical/surgical history, family history, social history, and examination findings as documented by others; and I examined the patient myself. I personally reviewed the relevant tests, images, and reports as documented above.     I formulated and edited as necessary the assessment and plan and discussed the findings and management plan with the patient and family.      Sofia Morales MD

## 2022-05-11 ENCOUNTER — OFFICE VISIT (OUTPATIENT)
Dept: AUDIOLOGY | Facility: CLINIC | Age: 67
End: 2022-05-11
Attending: INTERNAL MEDICINE
Payer: COMMERCIAL

## 2022-05-11 DIAGNOSIS — H90.3 BILATERAL SENSORINEURAL HEARING LOSS: ICD-10-CM

## 2022-05-11 PROCEDURE — 92591 PR HEARING AID EXAM BINAURAL: CPT | Performed by: AUDIOLOGIST

## 2022-05-11 PROCEDURE — 99207 PR NO CHARGE LOS: CPT | Performed by: AUDIOLOGIST

## 2022-05-11 NOTE — PROGRESS NOTES
AUDIOLOGY REPORT    SUBJECTIVE: Holly Muir is a 67 year old female was seen in the Audiology Clinic at  [facility] on 5/11/22 to discuss concerns with hearing and functional communication difficulties. Holly has been seen previously on 3/8/22, and results revealed a mild to moderate sensorineural hearing loss bilaterally.  The patient was medically evaluated and determined to be cleared for binaural hearing aids by Jaydon Gaona MD. Holly notes difficulty with communication in a variety of listening situations.    OBJECTIVE:    Patient is a hearing aid candidate. Patient would like to move forward with a hearing aid evaluation today. Therefore, the patient was presented with different options for amplification to help aid in communication. Discussed styles, levels of technology and monaural vs. binaural fitting.     The hearing aid(s) mutually chosen were:  Binaural: Phonak Audeo   COLOR: beige  BATTERY SIZE: 312  EARMOLD/TIPS: canal lock  CANAL/ LENGTH: 1    Otoscopy revealed ears are clear of cerumen bilaterally. Bilateral earmolds were taken without incident.    ASSESSMENT:     ICD-10-CM    1. Bilateral sensorineural hearing loss  H90.3 Adult Audiology Referral     Hearing Aid Exam, Binaural (11117)       Reviewed purchase information and warranty information with patient. The 45 day trial period was explained to patient. The patient was given a copy of the [place] Department [employer] consumer brochure on purchasing hearing instruments. Patient risk factors have been provided to the patient in writing prior to the sale of the hearing aid per FDA regulation. The risk factors are also available in the User Instructional Booklet to be presented on the day of the hearing aid fitting. Hearing aid(s) ordered. Hearing aid evaluation completed.    PLAN: Holly is scheduled to return in 2-3 weeks for a hearing aid fitting and programming. Purchase agreement will be completed  on that date. Please contact this clinic with any questions or concerns.      Cherie Powers.  Doctor of Audiology  [place] License # [id number]

## 2022-05-12 ENCOUNTER — TELEPHONE (OUTPATIENT)
Dept: FAMILY MEDICINE | Facility: CLINIC | Age: 67
End: 2022-05-12
Payer: COMMERCIAL

## 2022-05-12 NOTE — TELEPHONE ENCOUNTER
Faxed new orders to Ally at [facility] for Coloplast Chip ostomy appliances #[id number] forty per month requesting 40 appliances and Coloplast EBS strips #[id number] two boxes per month requesting two boxes.  I spoke with Ally via phone after faxing the information, she confirmed receipt of the order and will contact the pt and her  with [facility] to update on ETA of supplies.    I will also send message to [facility] nursing  concerning the issues with obtaining orders through primary.

## 2022-05-12 NOTE — TELEPHONE ENCOUNTER
Patient calling again regarding this as she still doesn't have the bags. Please call her at [phone]

## 2022-05-12 NOTE — TELEPHONE ENCOUNTER
Ally Yepez now calling to get updated orders so she can fill this- phone number is  [phone] and fax number is [phone]

## 2022-05-13 ENCOUNTER — VIRTUAL VISIT (OUTPATIENT)
Dept: ENDOCRINOLOGY | Facility: CLINIC | Age: 67
End: 2022-05-13
Payer: MEDICARE

## 2022-05-13 DIAGNOSIS — E11.65 TYPE 2 DIABETES MELLITUS WITH HYPERGLYCEMIA, WITH LONG-TERM CURRENT USE OF INSULIN (H): Primary | ICD-10-CM

## 2022-05-13 DIAGNOSIS — E11.9 TYPE 2 DIABETES MELLITUS (H): ICD-10-CM

## 2022-05-13 DIAGNOSIS — Z91.148 POOR COMPLIANCE WITH MEDICATION: ICD-10-CM

## 2022-05-13 DIAGNOSIS — Z79.4 TYPE 2 DIABETES MELLITUS WITH HYPERGLYCEMIA, WITH LONG-TERM CURRENT USE OF INSULIN (H): Primary | ICD-10-CM

## 2022-05-13 PROCEDURE — 99214 OFFICE O/P EST MOD 30 MIN: CPT | Mod: 95 | Performed by: INTERNAL MEDICINE

## 2022-05-13 RX ORDER — CHOLECALCIFEROL (VITAMIN D3) 25 MCG
1 TABLET ORAL DAILY
COMMUNITY
Start: 2022-04-21 | End: 2022-12-28

## 2022-05-13 RX ORDER — BUPROPION HYDROCHLORIDE 150 MG/1
150 TABLET ORAL DAILY
COMMUNITY
Start: 2022-05-01 | End: 2023-07-05

## 2022-05-13 NOTE — LETTER
5/13/2022         RE: Holly Muir  [address] [facility] [id number]        Dear Colleague,    Thank you for referring your patient, Holly Muir, to the [facility]. Please see a copy of my visit note below.    Outcome for 05/09/22 1:56 PM: Patient declined to share glucose readings prefers to have stan uploaded at the clinic.  Yajaira Rajan [facility]  Adult Endocrinology  [facility] [place]  Outcome for 05/13/22 10:47 AM: Data obtained via phone and located below  SIENA Pike    5/12: Evening 270,   5/11: Evening 131, afternoon 81,   5/10: afternoon 323,   5/9: evening 283, afternoon 176, AM 85  5/8: evening 54, afternoon 242,   5/7: evening 264, afternoon 106,   5/6: evening 239,   5/5: evening 128, afternoon 138,   5/4: evening 276, afternoon 68, AM 55  5/3: evening 193, afternoon 129, AM 72  5/2: evening 140, afternoon 232,   5/1: evening 446, afternoon 384,   4/20: evening 156, afternoon 73, AM 94  4/29: evening 80, afternoon 295,       Holly is a 67 year old who is being evaluated via a billable telephone visit.      What phone number would you like to be contacted at? [phone]   How would you like to obtain your AVS? Zite  Phone call duration: 25 minutes    Pt states there are no changes to their allergy and medication list since last reviewed on 5/9/22.    Nicole Eugene, SIENA                                                          - Endocrinology Follow up -    Reason for visit/consult:  Uncontrolled type 2 diabetes mellitus with hyperglycemia, with long-term current use of insulin (H)    Primary care provider: Tiffany King      Assessment and Plan    # DM2, night time eating habits  A1c 8.9, improved from 10.5 over 1 month, by reviewing glucometer average glucose 156 past 1 month.  She mentioned occasionally lower side of glucose am,     - Reduce basaglar to 45 units daily since she has  been experiencing occasional hypoglycemia day/night both    - compliance issue due to depression  We will try to hold novolog and add Glimepiride 2 mg daily       -Continue current metformin 1000 mg bid  - Continue current Actos 45 mg, body weight stable.       # DM education  Her daughter wants to know more about diabetic foods.   They will make appt with Mikayla.     # Mental health/Depression      # DM complication  Urine microalbumin due (ordered)    -RTC with Mikayla in 2 month and me in 6 months         35  minutes spent on the date of the encounter doing chart review, history and exam, documentation and further activities as noted above.    Elizabeth Medrano MD  Staff Physician  Endocrinology and Metabolism  [facility]  License: [place] [id number]  Pager: [phone]      Current Diabetic Regimens:  Basaglar 45 units daily  NovoLog 6-12 units each meal (hold now as of 5/2022 due to compliance/depression)  Metformin 1000 twice daily  Actos 45 mg daily  Glimepiride 2 mg daily (5/2022-new)    Life Style:  Wake up 10-11 am  Breakfast after  Lunch 1-2 pm: sandwich  Dinner 6 pm: mexican fat foods  Bedtime: 9 pm  3 am : wake up and snacks    Exercise:  walking    DM complications:  Retinopathy: due not been for 1-2 years.   Nephropathy: urine microalbumin negative 8/2018 - due  Neuropathy:   Most recent LDL:   LDL Cholesterol Calculated   Date Value Ref Range Status   10/08/2021 39 <=100 mg/dL Final   06/18/2020 26 <100 mg/dL Final     Comment:     Desirable:       <100 mg/dl       Glucose Log: fluctuate from 55 - 400.     Interval History as of 5/13/2022 : Patient has been doing ok, depression fluctuates. She forgets insulin.   Interval History as of 11/12/2021 : Patient has been doing better, back to compliant to medication . New event includes : A1c increased 10.5 while she was in [place] and not better .  Interval History as of 12/11/2020 : Patient has been doing well.A1c 7.9 today (12/11/2020), her glucose  improved significantly, came with her daughter today and she is watching mother's diet.   Interval History as of 6/5/2020 : Fluctuating glucose, lots fast foods nowadays. Medication compliance good .  Interval History as of 9/20/2019 : Follow-up in 4 months, compliant medications.  However she still have nighttime snacking habit every night, when she eat carbohydrate snacks glucose in the morning was 250s.  Light fruits 160-180s.  Patient mentioned depression was worse spring 2019 but currently relatively okay and she has psychiatrist to follow-up.   Interval History as of 5/2019: Patient came by herself she missed appointment, increasing the dose of insulin, mentioned started to have a glucose increase 300 in the fasting in the morning her mental issue.  Seen by new psychiatrist. Visitign nurse once a week.    HPI: A 61 yo female second follow up DM2, she has remote history of colectomy. This is the third visit.   Used to use V go for several month, she was confused the system, worsened A1C, then we switch back to insulin injection regimens.    Currently lantus 38 utnis and novology 6-8-8 and sliding scale and metformin 1000 bid.   Last visit prscribed actos 30 mg daily, however she mentioned, she is not sure which one is actos and she may not taking actos.      Of note,  she has had sleeping issues, which distracts her life significantly. She cannot sleep at night, usually sleeps in the day, during the night, she has been hungry and eating peanuts butters, jelly, fruits etc.   Sometimes she cannot sleep for 2 days.   She has visiting nurse once a week, yesterday the nurse called our clinic that her glucose was 500. Talked with Mikayla, lantus was increased from 38 to 42 units.     Glucose: she forgot to bring glucometer today.     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     Past Surgical History:   Procedure Laterality Date     ARTHROSCOPY KNEE RT/LT      LT     BLEPHAROPLASTY BILATERAL Bilateral 8/16/2019    Procedure: BILATERAL UPPER LID BLEPHAROPLASTY;  Surgeon: Randolph Cook MD;  Location: [facility] OR     BREAST LUMPECTOMY, RT/LT      LT-benign      CATARACT IOL, RT/LT       COLONOSCOPY  5/10/2012    Procedure:COLONOSCOPY; screening colonoscopy; Surgeon:MILLA VEGA; Location:[facility] OR     COLONOSCOPY WITH CO2 INSUFFLATION N/A 8/2/2019    Procedure: Colonoscopy with CO2 insufflation;  Surgeon: Himanshu Hi MD;  Location: [facility] OR     COLOSTOMY  2000     COLOSTOMY       HYSTERECTOMY, PAP NO LONGER INDICATED      done in [place]-cervical cancer     IMPLANT STIMULATOR SACRAL NERVE STAGE ONE Right 3/10/2015    Procedure: IMPLANT STIMULATOR SACRAL NERVE STAGE ONE;  Surgeon: Teodora Yusuf MD;  [facility]     IMPLANT STIMULATOR SACRAL NERVE STAGE TWO Right 3/31/2015    Procedure: IMPLANT STIMULATOR SACRAL NERVE STAGE TWO;  Surgeon: Teodora Yusuf MD;  [facility]     IMPLANT STIMULATOR SACRAL NERVE STAGE TWO Right 6/22/2020    Procedure: INSERTION, SACRAL NERVE STIMULATOR, STAGE 2 (replacement of interstim battery);  Surgeon: Teodora Yusuf MD;  Location: [facility]     PHACOEMULSIFICATION WITH STANDARD INTRAOCULAR LENS IMPLANT Left 9/26/2019    Procedure: LEFT PHACOEMULSIFICATION, CATARACT, WITH STANDARD IOL INSERTION;  Surgeon: Francesco Winn MD;  Location: [facility]     PHACOEMULSIFICATION WITH STANDARD INTRAOCULAR LENS IMPLANT Right 10/24/2019    Procedure: RIGHT PHACOEMULSIFICATION, CATARACT, WITH STANDARD IOL INSERTION;  Surgeon: Pa  Francesco Lu MD;  Location: [facility] OR     REPAIR PTOSIS Bilateral 08/16/2019     SALPINGO OOPHORECTOMY,R/L/JENNIFER      Salpingo Oophorectomy, RT/LT/JENNIFER     [facility] EXCIS PTERYGIUM  10/08    [facility]     [facility] STOMACH SURGERY PROCEDURE UNLISTED       RUST COLONOSCOPY THRU STOMA, DIAGNOSTIC  2002    normal per report-no records available-records destroyed       Allergies:  Allergies   Allergen Reactions     Morphine Sulfate Itching       Current Medications   Current Outpatient Medications   Medication     ACETAMINOPHEN EXTRA STRENGTH 500 MG tablet     ARIPiprazole (ABILIFY) 30 MG tablet     ARIPiprazole (ABILIFY) 5 MG tablet     ASPIRIN LOW DOSE 81 MG chewable tablet     blood glucose (ACCU-CHEK CELIA PLUS) test strip     blood glucose monitoring (ACCU-CHEK FASTCLIX) lancets     buPROPion (WELLBUTRIN XL) 150 MG 24 hr tablet     buPROPion (WELLBUTRIN XL) 300 MG 24 hr tablet     busPIRone HCl (BUSPAR) 30 MG tablet     cetirizine (ZYRTEC) 10 MG tablet     Cholecalciferol (VITAMIN D3) 25 MCG TABS     ferrous gluconate (FERGON) 324 (38 Fe) MG tablet     gabapentin (NEURONTIN) 600 MG tablet     ibuprofen (ADVIL/MOTRIN) 400 MG tablet     insulin aspart (NOVOLOG FLEXPEN) 100 UNIT/ML pen     insulin glargine (BASAGLAR KWIKPEN) 100 UNIT/ML pen     insulin pen needle (31G X 5 MM) 31G X 5 MM miscellaneous     metFORMIN (GLUCOPHAGE) 1000 MG tablet     Multiple Vitamin (DAILY-ISAAC MULTIVITAMIN) TABS     nystatin (MYCOSTATIN) 691315 UNIT/GM external powder     omeprazole (PRILOSEC) 20 MG DR capsule     order for DME     order for DME     order for DME     order for DME     ORDER FOR DME     ORDER FOR DME     PARoxetine (PAXIL) 20 MG tablet     pioglitazone (ACTOS) 45 MG tablet     propranolol ER (INDERAL LA) 80 MG 24 hr capsule     ramipril (ALTACE) 5 MG capsule     rOPINIRole (REQUIP) 0.25 MG tablet     simvastatin (ZOCOR) 20 MG tablet     traMADol (ULTRAM) 50 MG tablet     traZODone (DESYREL) 50 MG tablet     UNIVERSAL REMOVER WIPES EX  MISC     vitamin C (CVS VITAMIN C) 500 MG tablet     VITAMIN D3 50 MCG (2000 UT) tablet     Continuous Blood Gluc  (FREESTYLE CM 14 DAY READER) GALINA     diclofenac (VOLTAREN) 50 MG EC tablet     Current Facility-Administered Medications   Medication     triamcinolone (KENALOG-40) injection 40 mg     triamcinolone (KENALOG-40) injection 40 mg     triamcinolone (KENALOG-40) injection 40 mg     triamcinolone (KENALOG-40) injection 40 mg     triamcinolone (KENALOG-40) injection 40 mg     triamcinolone (KENALOG-40) injection 40 mg     triamcinolone (KENALOG-40) injection 40 mg     triamcinolone (KENALOG-40) injection 40 mg     triamcinolone (KENALOG-40) injection 40 mg     triamcinolone (KENALOG-40) injection 40 mg     triamcinolone (KENALOG-40) injection 40 mg     triamcinolone (KENALOG-40) injection 40 mg     triamcinolone (KENALOG-40) injection 40 mg       Family History:  Family History   Problem Relation Age of Onset     Diabetes Mother      Cerebrovascular Disease Mother      Diabetes Father      Hypertension Father      Cancer Maternal Grandfather      Cancer Paternal Grandfather      Diabetes Brother      Diabetes Sister      Thyroid Disease Daughter      Thyroid Disease Sister      Multiple Sclerosis Daughter      Glaucoma No family hx of      Macular Degeneration No family hx of        Social History:  Social History     Tobacco Use     Smoking status: Former Smoker     Packs/day: 0.00     Quit date: 2016     Years since quittin.7     Smokeless tobacco: Never Used   Substance Use Topics     Alcohol use: Yes     Alcohol/week: 0.0 standard drinks     Comment: social occasions   taking care of 11 and 18 year grand children and live [facility].     ROS:  Full review of systems taken with the help of the intake sheet. Otherwise a complete 14 point review of systems was taken and is negative unless stated in the history above.    Physical Exam:   not currently breastfeeding.  General: well appearing,  no acute distress, pleasant and conversant,   Mental Status/neuro: alert and oriented  Face: symmetrical, normal facial color  Eyes: anicteric, no proptosis or lid lag  Resp: no acute distress        Labs (General):      Ref. Range 8/24/2020 14:23 12/11/2020 00:00 1/6/2021 09:32 10/8/2021 09:22 11/12/2021 00:00   Hemoglobin A1C Latest Ref Range: 0.0 - 5.7 % 10.6 (H) 7.9 (A) 7.7 (H) 10.5 (H) 8.9 (A)               Again, thank you for allowing me to participate in the care of your patient.        Sincerely,        Elizabeth Medrano MD

## 2022-05-16 ENCOUNTER — TELEPHONE (OUTPATIENT)
Dept: ENDOCRINOLOGY | Facility: CLINIC | Age: 67
End: 2022-05-16
Payer: COMMERCIAL

## 2022-05-16 RX ORDER — BLOOD SUGAR DIAGNOSTIC
STRIP MISCELLANEOUS
Qty: 300 STRIP | Refills: 2 | OUTPATIENT
Start: 2022-05-16

## 2022-05-16 NOTE — TELEPHONE ENCOUNTER
Needs 2 month f/u with Mikayla Sellers.    follow up scheduled. Added to wait list for sooner follow up.  Taylor Myhre,VF

## 2022-05-16 NOTE — TELEPHONE ENCOUNTER
Needs 2 month f/u with Mikayla Sellers. Pt states that she will call and schedule with Mikayla Washburn follow up scheduled. Added to wait list for sooner follow up.  Taylor Myhre,VF

## 2022-05-18 ENCOUNTER — TELEPHONE (OUTPATIENT)
Dept: WOUND CARE | Facility: CLINIC | Age: 67
End: 2022-05-18
Payer: COMMERCIAL

## 2022-05-18 NOTE — TELEPHONE ENCOUNTER
Patient notified of below and has no further questions or concerns.    Fanny Abraham RN on 5/18/2022 at 12:58 PM                  Left for  valarie two piece appliances as follows for use. 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   Left five of each of the following.  Valarie skin barrier wafers #[id number], Valarie pouches #[id number], and barrier rings #[id number].   Fanny PRESTON RN update and will call pt to inform supplies are available.

## 2022-05-18 NOTE — TELEPHONE ENCOUNTER
Reason for Call:  Other supplies    Detailed comments: Patient states she is low on supplies and is asking if some could be left for her at registration?  Please call     Phone Number Patient can be reached at: Home number on file [phone] (home) or Cell number on file:    Telephone Information:   Mobile [phone]       Best Time: any    Can we leave a detailed message on this number? YES    Call taken on 5/18/2022 at 8:32 AM by Jenae Hickey

## 2022-05-23 ENCOUNTER — TRANSFERRED RECORDS (OUTPATIENT)
Dept: HEALTH INFORMATION MANAGEMENT | Facility: CLINIC | Age: 67
End: 2022-05-23
Payer: COMMERCIAL

## 2022-05-24 ENCOUNTER — TELEPHONE (OUTPATIENT)
Dept: WOUND CARE | Facility: CLINIC | Age: 67
End: 2022-05-24
Payer: COMMERCIAL

## 2022-05-24 ENCOUNTER — TELEPHONE (OUTPATIENT)
Dept: FAMILY MEDICINE | Facility: CLINIC | Age: 67
End: 2022-05-24
Payer: COMMERCIAL

## 2022-05-24 ENCOUNTER — VIRTUAL VISIT (OUTPATIENT)
Dept: FAMILY MEDICINE | Facility: CLINIC | Age: 67
End: 2022-05-24
Payer: MEDICARE

## 2022-05-24 DIAGNOSIS — R42 VERTIGO: ICD-10-CM

## 2022-05-24 DIAGNOSIS — M54.16 LUMBAR RADICULOPATHY: Primary | ICD-10-CM

## 2022-05-24 DIAGNOSIS — G25.81 RESTLESS LEG SYNDROME: ICD-10-CM

## 2022-05-24 PROCEDURE — 99215 OFFICE O/P EST HI 40 MIN: CPT | Mod: 95 | Performed by: INTERNAL MEDICINE

## 2022-05-24 RX ORDER — TRAMADOL HYDROCHLORIDE 50 MG/1
50 TABLET ORAL 2 TIMES DAILY PRN
Qty: 18 TABLET | Refills: 0 | Status: SHIPPED | OUTPATIENT
Start: 2022-05-24 | End: 2022-08-31

## 2022-05-24 RX ORDER — CELECOXIB 100 MG/1
100 CAPSULE ORAL 2 TIMES DAILY
Qty: 60 CAPSULE | Refills: 1 | Status: SHIPPED | OUTPATIENT
Start: 2022-05-24 | End: 2022-07-14

## 2022-05-24 ASSESSMENT — ANXIETY QUESTIONNAIRES
3. WORRYING TOO MUCH ABOUT DIFFERENT THINGS: NEARLY EVERY DAY
6. BECOMING EASILY ANNOYED OR IRRITABLE: MORE THAN HALF THE DAYS
5. BEING SO RESTLESS THAT IT IS HARD TO SIT STILL: MORE THAN HALF THE DAYS
1. FEELING NERVOUS, ANXIOUS, OR ON EDGE: SEVERAL DAYS
IF YOU CHECKED OFF ANY PROBLEMS ON THIS QUESTIONNAIRE, HOW DIFFICULT HAVE THESE PROBLEMS MADE IT FOR YOU TO DO YOUR WORK, TAKE CARE OF THINGS AT HOME, OR GET ALONG WITH OTHER PEOPLE: VERY DIFFICULT
GAD7 TOTAL SCORE: 17
7. FEELING AFRAID AS IF SOMETHING AWFUL MIGHT HAPPEN: NEARLY EVERY DAY
2. NOT BEING ABLE TO STOP OR CONTROL WORRYING: NEARLY EVERY DAY
4. TROUBLE RELAXING: NEARLY EVERY DAY
GAD7 TOTAL SCORE: 17

## 2022-05-24 ASSESSMENT — PATIENT HEALTH QUESTIONNAIRE - PHQ9: SUM OF ALL RESPONSES TO PHQ QUESTIONS 1-9: 15

## 2022-05-24 NOTE — TELEPHONE ENCOUNTER
Reason for Call:  Other call back    Detailed comments: Patient states her colostomy bags are still on back order and she is down to 2.  She called today and the order has not yet come in.  Can she get some more samples?  Please call    Phone Number Patient can be reached at: Home number on file [phone] (home) or Cell number on file:    Telephone Information:   Mobile [phone]       Best Time: any    Can we leave a detailed message on this number? YES    Call taken on 5/24/2022 at 11:25 AM by Jenae Hickey

## 2022-05-24 NOTE — COMMUNITY RESOURCES LIST (ENGLISH)
05/24/2022   [facility] - Outpatient Clinics  Pranav Fajardo  For questions about this resource list or additional care needs, please contact your primary care clinic or care manager.  Phone: [phone]   Email: N/A   Address: [address] [place] [address]   Hours: N/A        Hotlines and Helplines       Hotline - Crisis help  1  [facility] Distance: 3.59 miles      COVID-19 Status: Phone/Virtual   [address] [place] [address]  Language: English  Hours: Mon - Sun Open 24 Hours   Phone: [phone]) [phone] Website: https://www.[employer].org/location/[facility]/     2  [facility] - 24-Hour Helpline Distance: 3.82 miles      COVID-19 Status: Regular Operations   [address] [address] [place] [address]  Language: Yakut, English, Hmong, Monegasque, Andorran  Hours: Mon - Sun Open 24 Hours   Phone: (634) [phone] Email: [email or web address]@[email or web address] Website: [email or web address]://[email or web address]          Mental Health       Individual counseling  3  [facility] Distance: 2.58 miles      COVID-19 Status: Regular Operations, COVID-19 Status: Phone/Virtual   [address] [place] [address]  Language: English, Andorran  Hours: Mon - Thu 8:00 AM - 8:00 PM , Fri 8:00 AM - 5:00 PM  Fees: Insurance, Self Pay, Sliding Fee   Phone: (537) [phone] Email: [email or web address] Website: http://www.[facility].org/program/[facility]/     4  [facility] Distance: 2.78 miles      COVID-19 Status: Regular Operations, COVID-19 Status: Phone/Virtual   [address] [place] [address]  Language: English  Hours: Mon - Thu 7:00 AM - 9:00 PM , Fri 7:00 AM - 5:00 PM  Fees: Insurance, Self Pay, Sliding Fee   Phone: (698) 759-5620 Email: [email or web address] Website:  https://www.[employer]/our-locations/[place]/pxro-obcfbi-mpnhfd     Mental health crisis care  5  [facility] Distance: 3.59 miles      COVID-19 Status: Regular Operations, COVID-19 Status: Phone/Virtual   [address] [place] [address]  Language: English  Hours: Mon - Sun Open 24 Hours  Fees: Insurance, Self Pay   Phone: (804) 624-9447 Website: https://www.[employer]/location/[facility]/     6  [facility] Acute Psychiatry Services Distance: 15.72 miles      COVID-19 Status: Regular Operations   [address] [place] [address]  Language: English  Hours: Mon - Sun Open 24 Hours  Fees: Insurance, Self Pay, Sliding Fee   Phone: (883) [phone] Website: https://www.[facility].org/specialty/psychiatry/acute-psychiatry-services/     Mental health support group  7  [facility] [place] [facility] [place] [facility] Distance: 6.25 miles      COVID-19 Status: Phone/Virtual   [address] [place] [address]  Language: English  Hours: Mon - Fri 9:00 AM - 3:00 PM  Fees: Free   Phone: (174) 585-5749 Website: http://www.[facility].org/     8  [facility] Distance: 11.85 miles      COVID-19 Status: Unable to Verify   [address] [place] [address]  Language: English  Hours: Tue 6:30 PM - 8:30 PM  Fees: Free   Phone: (444) 581-8372 Email: sole@[employer] Website: http://www.[employer].org/pages/home          Important Numbers & Websites       Emergency Services   911  City Services   311  Poison Control   (510) 788-1687  Suicide Prevention Lifeline   (965) 389-9137 (TALK)  Child Abuse Hotline   (700) 849-8590 (4-A-Child)  Sexual Assault Hotline   (674) 212-5038 (HOPE)  National Runaway Safeline   (865) 419-4592 (RUNAWAY)  All-Options Talkline   (157) 534-1525  Substance Abuse Referral   (651) 423-3293 (HELP)

## 2022-05-24 NOTE — PROGRESS NOTES
Holly is a 67 year old who is being evaluated via a billable telephone visit.      What phone number would you like to be contacted at? [phone]  How would you like to obtain your AVS? Mail a copy    Assessment & Plan     Holly was seen today for recheck medication.    Diagnoses and all orders for this visit:    Lumbar radiculopathy  -     celecoxib (CELEBREX) 100 MG capsule; Take 1 capsule (100 mg) by mouth 2 times daily    Vertigo  -     Physical Therapy Referral; Future    Restless leg syndrome  Comments:  gabapentin    Other orders  -     traMADol (ULTRAM) 50 MG tablet; Take 1 tablet (50 mg) by mouth 2 times daily as needed for severe pain       Discussed with patient the importance of not relying on narcotics for pain relief, will need to treat the cause.  Originally tramadol was given for acute knee injury from a fall.  That issue has resolved.  Patient now reports back pain, sounded recurrent, with lumbar radiculopathy, radiating to the right leg.  When she was on diclofenac, she had significant improvement in pain for her knee as well as helping some with the back.  Diclofenac has drug interaction with paroxetine as well as GI side effects.  Not recommended for long-term use.  I will have her try a prescription of Celebrex 100 mg twice a day to help with the back pain.  Physical therapy will be helpful.  May consider revisit with sports medicine for epidural injection.  She will continue to use gabapentin for restless leg, would not increase the dose on this for now.  I gave her a few tablets of tramadol to use sparingly for severe pain.    She also endorsed symptoms of positional vertigo.  I put in a new referral for physical therapy to evaluate this.    In terms of fibromyalgia, she does not have this diagnosis on her chart.  Her pain pattern are more related to osteoarthritis, acute pain injury, radiculopathy. She does not have fibromyalgia by history. Exam not done today.     Reassess back pain after  PT in 5 weeks.     Return in about 5 weeks (around 6/28/2022) for Virtual visit follow up on back pain.    I spent a total of 45 minutes on the day of the visit.  doing chart review, history and exam, documentation and further activities as noted above      Tiffany King MD PhD  [facility] LARRY Barrera is a 67 year old who presents for the following health issues     HPI   Having a lot of back pain, holding onto pills, doesn't feel like there is enough   Medication Followup of tramodal     Taking Medication as prescribed: yes    Side Effects:  None    Medication Helping Symptoms:  yes     Pt had  Virtual visit on 4/21/2022 for follow up on fall, knee pain. Put back on Tramadol. She went to see sports medicine, had injection, and given diclofenac, the knee pain finally got better.  While she was on diclofenac, she did not take tramadol.  It helped quite a bit of the knee pain. After she used up the diclofenac, she started taking tramadol.  But she is holding it.  Now she has 4 tablets left.  She would like to have more.      She says she is taking this now for back pain.  She had back pain before, had injections before and got better.  The last couple weeks, the pain came back. It started mild and now it is working its way up. She takes her walker to walk. Has to sit down a lot.  She did see sports medicine for the knee pain, as well as discussing the back.  She was given a referral to see physical therapy for the back pain.  Her appointment is not until early June.    Lately she has had dizzy spells She is getting little headaches. Feels like the room spins half way. This started around the beginning of May. Starts when she gets up. Pt is somewhat vague about frequency but does endorse feeling room spinning.     She wonders if she has fibromyalgia. No prior diagnosis.     She has gabapentin for RLS but it doesn't help the back pain. The back pain goes down to the right side.       Review  of Systems   Constitutional, HEENT, cardiovascular, pulmonary, gi and gu systems are negative, except as otherwise noted.      Objective           Vitals:  No vitals were obtained today due to virtual visit.    Physical Exam   healthy, alert and no distress  PSYCH: Alert and oriented times 3; coherent speech, normal   rate and volume, able to articulate logical thoughts, able   to abstract reason, no tangential thoughts, no hallucinations   or delusions  Her affect is normal  RESP: No cough, no audible wheezing, able to talk in full sentences  Remainder of exam unable to be completed due to telephone visits        Phone call duration: 22  minutes

## 2022-05-24 NOTE — PATIENT INSTRUCTIONS
Additional instructions:  Take Celebrex twice a day scheduled.   Reserve tramadol for severe pain only if needed.   Physical therapy for vestibular balance (for the spinning dizziness). This is different from the physical therapy for your back.

## 2022-05-24 NOTE — TELEPHONE ENCOUNTER
Reviewed last note of which supplies were given.      Left five of each of the following.  [place] skin barrier wafers #[id number], [place] pouches #[id number], and barrier rings #[id number].       I was able to find Valarie pouches #[id number] and barrier rings #[id number] but unable to find the Valarie skin barrier wafers #[id number].      I called the patient who was okay with just getting the supplies we have and will call back if she does not get her shipment soon.     #4 of the two supplies we had on hand were placed at the  in specialty, [place] for patient to . She is aware of where to  supplies.     Sophie DURAN, Lead RN, BSN. . .  5/24/2022, 12:02 PM

## 2022-06-01 ENCOUNTER — OFFICE VISIT (OUTPATIENT)
Dept: AUDIOLOGY | Facility: CLINIC | Age: 67
End: 2022-06-01
Payer: COMMERCIAL

## 2022-06-01 DIAGNOSIS — H90.3 SENSORINEURAL HEARING LOSS (SNHL) OF BOTH EARS: Primary | ICD-10-CM

## 2022-06-01 PROCEDURE — V5011 HEARING AID FITTING/CHECKING: HCPCS | Mod: RT | Performed by: AUDIOLOGIST

## 2022-06-01 PROCEDURE — V5011 HEARING AID FITTING/CHECKING: HCPCS | Mod: LT | Performed by: AUDIOLOGIST

## 2022-06-01 PROCEDURE — 99207 PR NO CHARGE LOS: CPT | Performed by: AUDIOLOGIST

## 2022-06-01 PROCEDURE — V5264 EAR MOLD/INSERT: HCPCS | Mod: NU | Performed by: AUDIOLOGIST

## 2022-06-01 PROCEDURE — V5020 CONFORMITY EVALUATION: HCPCS | Mod: RT | Performed by: AUDIOLOGIST

## 2022-06-01 PROCEDURE — V5160 DISPENSING FEE BINAURAL: HCPCS | Performed by: AUDIOLOGIST

## 2022-06-01 PROCEDURE — 92593 PR HEARING AID CHECK, BINAURAL: CPT | Performed by: AUDIOLOGIST

## 2022-06-01 PROCEDURE — V5020 CONFORMITY EVALUATION: HCPCS | Mod: LT | Performed by: AUDIOLOGIST

## 2022-06-01 PROCEDURE — V5261 HEARING AID, DIGIT, BIN, BTE: HCPCS | Mod: NU | Performed by: AUDIOLOGIST

## 2022-06-01 NOTE — PROGRESS NOTES
AUDIOLOGY REPORT    SUBJECTIVE: Holly Muir, a 67 year old female, was seen in the Audiology Clinic at [facility] today for a Binaural hearing aid fitting. Previous results have revealed a bilateral mild to moderate sensorineural hearing loss. The patient was given medical clearance to pursue amplification by  Jaydon Gaona MD.      OBJECTIVE:  Prior to fitting, a hearing aid check was performed to ensure device functionality. The hearing aid conformity evaluation was completed.The hearing aids were placed and they provided a good fit. Real-ear-probe-microphone measurements were completed on the Beestar system and were a good match to NAL-NL2 target with soft sounds audible, moderate sounds comfortable, and loud sounds below discomfort. UCLs are verified through maximum power output measures and demonstrate appropriate limiting of loud inputs. Ms. Muir was oriented to proper hearing aid use, care, cleaning (no water, dry brush), batteries (size 312, insertion/removal, toxicity, low-battery signal), aid insertion/removal, user booklet, warranty information, storage cases, and other hearing aid details. The patient confirmed understanding of hearing aid use and care, and showed proper insertion of hearing aid and batteries while in the office today. Ms. Muir reported good volume and sound quality today.    EAR(S) FIT: Binaural  MA HEARING AID MAKE: Right: Phonak; Left: Phonak  MA HEARING AID MODEL #: Right:[id number]  ; Left:  [id number]  HEARING AID STYLE: Right: LEISA; Left: LEISA   LENGTH: Right: 1  ; Left:   1  EARMOLDS: Right: c sheel; Left:  c shell  SERIAL NUMBERS: Right: [id number]; Left: [id number]  WARRANTY END DATE: Right: 8/17/1925; Left:: 8/17/1925      CHARGES:   Earmold(s): , Qty 2, $160.00, NU (New)  Hearing Aid Check: Binaural, 38922, $81.00  Dispensing Fee: Binaural, , $500.00,   Fit/Orientation: Binaural, , $416.00  Hearing Aid Conformity  Evaluation: 2, , $174.00RT, LT  Hearing Aid Digital: Binaural, BTE, .,NU (Binaural) $4429  Total: $5760       ASSESSMENT: BInaural hearing aid fitting completed today. Verification measures were performed. The 45 day trial period was explained to patient, and they expressed understanding. Ms. Muir signed the Hearing Aid Purchase Agreement and was given a copy, as well as details on her hearing aids. Patient was counseled that exact out of pocket amounts cannot be determined for hearing aid claims being sent to insurance. Any insurance coverage information presented to the patient is an estimate only, and is not a guarantee of payment. Patient has been advised to check with their own insurance.    PLAN: Ms. Muir will return for follow-up in 2-3 weeks for a hearing aid review appointment. Please call this clinic with questions regarding today s appointment.    Cherie Powers.  Doctor of Audiology  [place] License # [id number]

## 2022-06-01 NOTE — PATIENT INSTRUCTIONS

## 2022-06-03 ENCOUNTER — TELEPHONE (OUTPATIENT)
Dept: ENDOCRINOLOGY | Facility: CLINIC | Age: 67
End: 2022-06-03
Payer: COMMERCIAL

## 2022-06-03 DIAGNOSIS — E11.9 TYPE 2 DIABETES MELLITUS (H): ICD-10-CM

## 2022-06-03 DIAGNOSIS — E11.65 UNCONTROLLED TYPE 2 DIABETES MELLITUS WITH HYPERGLYCEMIA, WITH LONG-TERM CURRENT USE OF INSULIN (H): ICD-10-CM

## 2022-06-03 DIAGNOSIS — Z79.4 UNCONTROLLED TYPE 2 DIABETES MELLITUS WITH HYPERGLYCEMIA, WITH LONG-TERM CURRENT USE OF INSULIN (H): ICD-10-CM

## 2022-06-03 RX ORDER — BLOOD SUGAR DIAGNOSTIC
STRIP MISCELLANEOUS
Qty: 300 STRIP | Refills: 2 | Status: SHIPPED | OUTPATIENT
Start: 2022-06-03 | End: 2022-06-17

## 2022-06-03 NOTE — TELEPHONE ENCOUNTER
Message from [facility] Medicare part B requires max of 4 time daily test for insulin dependency and max of 1 time daily for non-insulin.. please change accordingly ans send new RX.     Yajaira Rajan CMA  Adult Endocrinology  [facility], [place]

## 2022-06-06 ENCOUNTER — HOSPITAL ENCOUNTER (OUTPATIENT)
Dept: PHYSICAL THERAPY | Facility: CLINIC | Age: 67
Setting detail: THERAPIES SERIES
Discharge: HOME | End: 2022-06-06
Attending: OTOLARYNGOLOGY
Payer: COMMERCIAL

## 2022-06-06 DIAGNOSIS — H81.11 BENIGN PAROXYSMAL POSITIONAL VERTIGO, RIGHT: ICD-10-CM

## 2022-06-06 DIAGNOSIS — R42 VERTIGO: ICD-10-CM

## 2022-06-06 PROCEDURE — 95992 CANALITH REPOSITIONING PROC: CPT | Mod: GP | Performed by: PHYSICAL THERAPIST

## 2022-06-06 PROCEDURE — 97162 PT EVAL MOD COMPLEX 30 MIN: CPT | Mod: GP | Performed by: PHYSICAL THERAPIST

## 2022-06-06 NOTE — PROGRESS NOTES
[facility]                                                                                   OUTPATIENT PHYSICAL THERAPY FUNCTIONAL EVALUATION  PLAN OF TREATMENT FOR OUTPATIENT REHABILITATION  (COMPLETE FOR INITIAL CLAIMS ONLY)  Patient's Last Name, First Name, M.I.  YOB: 1955  Holly Muir     Provider's Name   [facility]   Medical Record No.  [id number]     Start of Care Date:  06/06/22   Onset Date:  04/25/22   Type:     _X__PT   ____OT  ____SLP Medical Diagnosis:  BPPV, R     PT Diagnosis:  s/s consistent with BPPV, imbalance Visits from SOC:  1                              __________________________________________________________________________________  Plan of Treatment/Functional Goals:  IADL retraining, balance training, neuromuscular re-education, ROM, strengthening, stretching, transfer training (canalith repositioning manuevers)           GOALS  DHI  Holly will lower score on DHI from 78 to 58 or < in order to show decreased dizziness to perform her functional mobility.  08/04/22    position changes  Holly will report no dizzinesss with bed mobility in 3/3 nights in order to more safely and easily perform bed mobility.  08/04/22             Therapy Frequency:  other (see comments) (3-6 more visits until issue resolves and balance is improved)   Predicted Duration of Therapy Intervention:  up to 60 days    [facility] New PT                                    I CERTIFY THE NEED FOR THESE SERVICES FURNISHED UNDER        THIS PLAN OF TREATMENT AND WHILE UNDER MY CARE     (Physician co-signature of this document indicates review and certification of the therapy plan).                Certification Date From:  06/06/22   Certification Date To:  09/03/22    Referring Provider:  Sophy Little MD    Initial Assessment  See Epic Evaluation- Start of  Care Date: 06/06/22

## 2022-06-06 NOTE — PROGRESS NOTES
06/06/22 1100   Quick Adds   Quick Adds Certification;Vestibular Eval   Type of Visit Initial OP PT Evaluation   General Information   Start of Care Date 06/06/22   Referring Physician Sophy Little MD   Orders Evaluate and Treat as Indicated   Order Date 04/25/22   Medical Diagnosis Benign paroxysmal positional vertigo, right   Onset of illness/injury or Date of Surgery 04/25/22   Surgical/Medical history reviewed Yes  (DMII, neuropathy, hypertension,)   Pertinent history of current problem (include personal factors and/or comorbidities that impact the POC) She notices getting out of bed that she would get dizzy-- it has been happening for ~2 months. No dizziness in sitting position. Head turns are challenging with the dizziness. She started using a cane about 1-2 years ago-- helps with back pain, also she feels unsteady and has hx of falls. She is also wanting PT for her back, will request order from PCP.   Pertinent Visual History  wears glasses today; double vision noted beginning of last year when watching TV or reading; her new glasses do help   Prior level of function comment she uses can for mobility, no formal exercise right now, wanting to start PT for her back.   Patient role/Employment history Retired   Living environment House/[facility]   Home/Community Accessibility Comments lives in a house, lives with daughter and son-in-law, 20 and 14 year old grand kids   Assistive Devices Comments SEC   Patient/Family Goals Statement decrease her dizziness, improve balance   Fall Risk Screen   Fall screen completed by PT   Have you fallen 2 or more times in the past year? Yes   Have you fallen and had an injury in the past year? Yes   Timed Up and Go score (seconds) not tested   Is patient a fall risk? Yes   Fall screen comments she did fall last month, missed two steps and went down; sprained her leg onces and bruising from her falls; other falls in the last 2 years, using cane to increase her safety.    Pain   Pain comments low back today and R hip pain   Vitals Signs   Heart Rate 69   Blood Pressure 131/69   Cognitive Status Examination   Orientation orientation to person, place and time   Level of Consciousness alert   Follows Commands and Answers Questions 100% of the time   Personal Safety and Judgment intact   Posture   Posture Comments forward shoulder posture in sitting   Range of Motion (ROM)   ROM Comment see below for neck info   Strength   Strength Comments appears to have weakness, decreased core strength with rolling on mat, also with back pain   Bed Mobility   Bed Mobility Comments slow and increased back pain   Transfer Skills   Transfer Comments uses UEs on chair for support   Gait   Gait Comments ambulates in and out of clinic at slow speed, SEC for balance   Sensory Examination   Sensory Perception Comments reports neuropathy in feet from diabetes   Cervicogenic Screen   Neck ROM decreased by at least 25% in all directions, no pain today   Vertebral Artery Test Comments tolerated pavan hallpike   Infrared Goggle Exam or Frenzel Lense Exam   Vestibular Suppressant in Last 24 Hours? No   Exam completed with Infrared Goggles   Spontaneous Nystagmus Negative   Gaze Evoked Nystagmus comments gaze evoked in the direction x 5-10 beats and then fatigues   Head Shake Horizontal Nystagmus Negative   Positional Testing comments Did modified pavan hallpike testing, no nystagmus noted in R pavan hallpike but reported some symptoms. Less symptomatic in L modified pavan but more symptomatic in traditional position, Back painful and limiting all motions today for testing. Feels like lying on L side at home is worse so treated this side today.   [place]-Hallpike (right) Negative   [place]avan-Hallpike (Left) Other   [place]-Hallpike (left) comments did not visualize torsional nystagmus but with symptoms in this position, closing her eyes, delayed onset   [facility] Supine Roll Test (Right) Negative   [facility] Supine Roll Test (Left) Negative    BPPV Canal(s) L Posterior   BPPV Type Canalithasis   Other Infrared Goggle Exam or Frenzel Lense Exam Comments noted some downbeating througout exam, slow speed, small amplitude   Planned Therapy Interventions   Planned Therapy Interventions IADL retraining;balance training;neuromuscular re-education;ROM;strengthening;stretching;transfer training  (canalith repositioning manuevers)   Clinical Impression   Criteria for Skilled Therapeutic Interventions Met yes, treatment indicated   PT Diagnosis s/s consistent with BPPV, imbalance   Influenced by the following impairments decreased balance strategies, dizziness with position changes, neuropathy, pain, hx of falls   Functional limitations due to impairments increased risk of falls and decreased ease of all functional mobility and iADLs   Clinical Presentation Evolving/Changing   Clinical Presentation Rationale severl co morbidities, clinical judgement, PT impairments   Clinical Decision Making (Complexity) Moderate complexity   Therapy Frequency other (see comments)  (3-6 more visits until issue resolves and balance is improved)   Predicted Duration of Therapy Intervention (days/wks) up to 60 days   Risk & Benefits of therapy have been explained Yes   Patient, Family & other staff in agreement with plan of care Yes   Clinical Impression Comments Holly presents with s/s consistent with BPPV, appears to be L posterior canal canalthiasis. Patient will benefit from skilled PT for canaltih repositioning manuevers. Also with impaired balance which is multifactorial and back pain appears to be contributing to this. Will ask PCP for additional PT order for her back pain as well.   GOALS   PT Eval Goals 1;2   Goal 1   Goal Identifier DHI   Goal Description Holly will lower score on DHI from 78 to 58 or < in order to show decreased dizziness to perform her functional mobility.   Target Date 08/04/22   Goal 2   Goal Identifier position changes   Goal Description Holly will  report no dizzinesss with bed mobility in 3/3 nights in order to more safely and easily perform bed mobility.   Target Date 08/04/22   Total Evaluation Time   PT Eval, Moderate Complexity Minutes (23148) 25   Therapy Certification   Certification date from 06/06/22   Certification date to 09/03/22   Medical Diagnosis BPPV, R   Certification I certify the need for these services furnished under this plan of treatment and while under my care.  (Physician co-signature of this document indicates review and certification of the therapy plan).

## 2022-06-07 ENCOUNTER — HOSPITAL ENCOUNTER (OUTPATIENT)
Dept: PHYSICAL THERAPY | Facility: CLINIC | Age: 67
Setting detail: THERAPIES SERIES
Discharge: HOME | End: 2022-06-07
Attending: OTOLARYNGOLOGY
Payer: COMMERCIAL

## 2022-06-07 DIAGNOSIS — M54.50 CHRONIC BILATERAL LOW BACK PAIN WITHOUT SCIATICA: Primary | ICD-10-CM

## 2022-06-07 DIAGNOSIS — G89.29 CHRONIC BILATERAL LOW BACK PAIN WITHOUT SCIATICA: Primary | ICD-10-CM

## 2022-06-07 PROCEDURE — 97112 NEUROMUSCULAR REEDUCATION: CPT | Mod: GP,59 | Performed by: PHYSICAL THERAPIST

## 2022-06-07 PROCEDURE — 95992 CANALITH REPOSITIONING PROC: CPT | Mod: GP | Performed by: PHYSICAL THERAPIST

## 2022-06-09 NOTE — PROGRESS NOTES
"Holly is a 66 year old who is being evaluated via a billable telephone visit.      What phone number would you like to be contacted at? [phone]   How would you like to obtain your AVS? [place]    Assessment & Plan     Diagnoses and all orders for this visit:    Hyponatremia  -     Sodium; Future    S/P colostomy (H)    Severe major depression with psychotic features (H)    Uncontrolled type 2 diabetes mellitus with hyperglycemia (H)       Hyponatremia in the setting of excessive fluid intake and high ostomy output. I recommend at minimum, she can reduce diet soda pop to no more than 1-2 cans a day. Cut down on the water by at least 1 jug. Daughter will monitor ostomy output.     Emotional support: it would be beneficial for her to have an emotional support pet and get the dog certified as a service dog for diabetes. Letter done.       Return in about 2 weeks (around 11/11/2021) for Virtual visit.    I spent a total of 61 minutes on the day of the visit.  doing chart review, history and exam, documentation and further activities as noted above       Tiffany King MD PhD  [facility]   Holly is a 66 year old who presents for the following health issues     Follow up on hyponatremia.   Pt had labs done 2 weeks ago with sodium of 131. Pt reported no new changes in meds.   She drinks a lot of fluids a day, 2 cups of coffee, 4-5 cans of soda pop, and 4-5 jugs (45 oz each) through out the day. She urinates a lot. She ha a colostomy back. Lately output has been more loose.     Pt moved in to her daughter Emilia\"s apartment and splits her time with Emilia and other daughter Cata. Due to pt's chronic health issues/medical and mental, family purchase a dog for her as emotional support pet 3 weeks ago and plans to get the dog certified as a service dog for her due to her diabetes. Pt needs a letter staying she will benefit from emotional support animal in order for them to keep th " dog in the apartment.     When patient is stressed, she resorts to eating. Daughter Emilia has now set up a camera in the kitchen, has seen the patient get up in the middle of the night to go to the kitchen to eat. Sometimes she gets so depressed and will not get up and do things, not eating as she should, then gets low sugar. They have had situation of finding her with really low sugar that created a scare for them. Daughter hopes to have the dog to help her, and get the dog trained to detect body changes associated with hypoglycemia, so family can be alerted of this quickly and to assist patient in her depression, help her avoid stress eating.     They have not been able to get through to her therapist. Her apartment wanted letter from PCP, although patient is under endocrine care for her diabetes.     Diabetes has gotten worse from most recent A1c. Patient reported it is from poor eating while she was out in [place] for her mother's illness. Since coming back, she has been better with her eating with her daughter's help.     Daughter would like to get more information about pt's insulin regimen and sliding scales. In the past, pt has been doing this on her own. The other daughter Cata had been the primary person of contact in regards to patients help. Now patient will be living with Emilia long term, she would like to get education on this.          Review of Systems   Constitutional, HEENT, cardiovascular, pulmonary, gi and gu systems are negative, except as otherwise noted.      Objective           Vitals:  No vitals were obtained today due to virtual visit.    Physical Exam   healthy, alert and no distress  PSYCH: Alert and oriented times 3; coherent speech, normal   rate and volume, able to articulate logical thoughts, able   to abstract reason, no tangential thoughts, no hallucinations   or delusions  Her affect is normal  RESP: No cough, no audible wheezing, able to talk in full  sentences  Remainder of exam unable to be completed due to telephone visits    Lab Results   Component Value Date    A1C 10.5 10/08/2021    A1C 7.7 01/06/2021    A1C 7.9 12/11/2020    A1C 10.6 08/24/2020    A1C 9.5 06/18/2020    A1C 7.7 12/10/2019       Phone call duration: 48 minutes   Name band;

## 2022-06-13 ENCOUNTER — TELEPHONE (OUTPATIENT)
Dept: EDUCATION SERVICES | Facility: CLINIC | Age: 67
End: 2022-06-13
Payer: COMMERCIAL

## 2022-06-13 NOTE — TELEPHONE ENCOUNTER
Writer spoke with pt. Appt scheduled with Cde this Fri at 9am. Pt reminded to bring meter with her.     Mikayla Sellers RN, BSN, [facility]   Certified Diabetes Care/  [facility]

## 2022-06-13 NOTE — TELEPHONE ENCOUNTER
----- Message from Elizabeth Medrano MD sent at 5/13/2022  5:03 PM CDT -----  Regarding: follow up  David Degroot,    I met her today, glucose . She still has depression and compliance to insulin is the issue.   This time I held novolog and put on glimepiride 2 mg daily, could you follow up her in 1-2 months?    Thank you    Elizabeth

## 2022-06-14 ENCOUNTER — PATIENT OUTREACH (OUTPATIENT)
Dept: GERIATRIC MEDICINE | Facility: CLINIC | Age: 67
End: 2022-06-14
Payer: COMMERCIAL

## 2022-06-14 NOTE — PROGRESS NOTES
[facility] Care Coordination Contact    Member changed health plan products from [facility] MSC+ to [facility] MSHO effective 6/1/2022. CC to complete items on Product Change/ Health Plan Change check list including MMIS entry. [place] and other required tasks.    iLz Hendricks  Care Management Specialist   [facility]   [phone]

## 2022-06-16 NOTE — PROGRESS NOTES
[facility] Care Coordination Contact      [facility] Health Plan or Product Change    CC received notification that member's health plan or health plan product changed from [facility] MSC+ to are MSHO effective 6/1/2022.  CC contacted member and discussed change and face-to-face visit was offered. Member declined need for home visit. CC reviewed previous Health Risk Assessment/LTCC and POC with member and no changes noted.    Called member and introduced self as member s new CC. Confirmed with member that the welcome letter with writer's name and contact information has been received.  Reviewed LTCC/Health Risk Assessment (HRA) and POC with member. No changes noted.  Transitional HRA completed. Care Plan Summary updated and reflects current services.  Required referral authorization information communicated to CMS: Not applicable  Writer reviewed the following with member:  ER visits: No  Hospitalizations: No  TCU stays: No  Significant health status changes: None  Falls/Injuries: No  ADL/IADL changes: No  Changes in services: No Member is CADI    Follow-Up Plan: Member informed of future contact, plan to f/u with member with at next regularly scheduled contact.  Contact information shared with member and family, encouraged member to call with any questions or concerns.  Divya Smalls RN,BSN  [facility] Case Coordinator   [phone]

## 2022-06-17 ENCOUNTER — TELEPHONE (OUTPATIENT)
Dept: ENDOCRINOLOGY | Facility: CLINIC | Age: 67
End: 2022-06-17

## 2022-06-17 ENCOUNTER — ALLIED HEALTH/NURSE VISIT (OUTPATIENT)
Dept: EDUCATION SERVICES | Facility: CLINIC | Age: 67
End: 2022-06-17
Payer: MEDICARE

## 2022-06-17 DIAGNOSIS — E11.40 TYPE 2 DIABETES MELLITUS WITH DIABETIC NEUROPATHY, WITH LONG-TERM CURRENT USE OF INSULIN (H): Primary | ICD-10-CM

## 2022-06-17 DIAGNOSIS — E11.65 TYPE 2 DIABETES MELLITUS WITH HYPERGLYCEMIA, WITH LONG-TERM CURRENT USE OF INSULIN (H): Primary | ICD-10-CM

## 2022-06-17 DIAGNOSIS — Z79.4 TYPE 2 DIABETES MELLITUS WITH DIABETIC NEUROPATHY, WITH LONG-TERM CURRENT USE OF INSULIN (H): Primary | ICD-10-CM

## 2022-06-17 DIAGNOSIS — Z79.4 UNCONTROLLED TYPE 2 DIABETES MELLITUS WITH HYPERGLYCEMIA, WITH LONG-TERM CURRENT USE OF INSULIN (H): ICD-10-CM

## 2022-06-17 DIAGNOSIS — Z79.4 TYPE 2 DIABETES MELLITUS WITH HYPERGLYCEMIA, WITH LONG-TERM CURRENT USE OF INSULIN (H): Primary | ICD-10-CM

## 2022-06-17 DIAGNOSIS — E11.65 UNCONTROLLED TYPE 2 DIABETES MELLITUS WITH HYPERGLYCEMIA, WITH LONG-TERM CURRENT USE OF INSULIN (H): ICD-10-CM

## 2022-06-17 DIAGNOSIS — E11.9 TYPE 2 DIABETES MELLITUS (H): ICD-10-CM

## 2022-06-17 PROCEDURE — G0108 DIAB MANAGE TRN  PER INDIV: HCPCS

## 2022-06-17 RX ORDER — BLOOD SUGAR DIAGNOSTIC
STRIP MISCELLANEOUS
Qty: 300 STRIP | Refills: 2 | Status: SHIPPED | OUTPATIENT
Start: 2022-06-17 | End: 2022-06-24

## 2022-06-17 RX ORDER — GLIMEPIRIDE 2 MG/1
2 TABLET ORAL
Qty: 90 TABLET | Refills: 1 | Status: SHIPPED | OUTPATIENT
Start: 2022-06-17 | End: 2022-09-07 | Stop reason: DRUGHIGH

## 2022-06-17 NOTE — TELEPHONE ENCOUNTER
Good morning. The Ver[employer] Plus test strips are not covered by patient insurance. The insurance will pay for One Touch products. The patient would need a script for a new meter, test strips and lancets. Otherwise, I can start a PA for the Ver[employer]ica Plus strips. Thank you!

## 2022-06-20 NOTE — PROGRESS NOTES
Diabetes Self Management Training: Individual Review Visit    Holly Muir presents today for evaluation of glucose control related to Type 2 diabetes.    She is accompanied by self. Her daughter, Raymundo joined the visit via telephone.     Patient's diabetes management related comments/concerns: None at this time.     Patient's emotional response to diabetes: expresses readiness to learn    Patient would like this visit to be focused around the following diabetes-related behaviors and goals: N/a.    ASSESSMENT:  Patient referred to Cde by her Pcp, Dr King, for review of diabetes self mgmt care and blood glucose levels. Holly was diagnosed with Type 2 Diabetes in 2002. She alternates living with two daughters, Cata and Emilia (Raymundo). She is currently living with Raymundo who is quite knowledgeable about diabetes and its mgmt. Since diagnosis, Holly has struggled with self mgmt care due to mental health issues and poor diet. She has a history of depression, disordered sleep patterns and craves sweets. She complaines today of feeling tried all the time.     Current Diabetes Management per Patient:  Taking diabetes medications? yes: see below    Diabetes Medication(s)     Biguanides       metFORMIN (GLUCOPHAGE) 1000 MG tablet    Take 1 tablet (1,000 mg) by mouth 2 times daily (with meals) For additional refills, please schedule a follow-up appointment, lab due    Insulin       insulin aspart (NOVOLOG FLEXPEN) 100 UNIT/ML pen    12 UNITS SUBCUTANEOUS PER MEAL, THREE TIMES A DAY.     Patient taking differently: 12 UNITS SUBCUTANEOUS PER MEAL, THREE TIMES A DAY. (6 units if a small meal, 12 units if a large meal)     insulin glargine (BASAGLAR KWIKPEN) 100 UNIT/ML pen    TAKE 56 UNITS SUBCUTANEOUSLY DAILY.    Sulfonylureas       glimepiride (AMARYL) 2 MG tablet    Take 1 tablet (2 mg) by mouth every morning (before breakfast)    Insulin Sensitizing Agents       pioglitazone (ACTOS) 45 MG tablet    Take 1 tablet (45 mg)  "by mouth daily          Do you have any difficulty affording your medications or glucose monitoring supplies? No.    Patient's glucose self monitoring as follows: Has been checking bg with a meter for the last several weeks due to having issues with insurance covering the Mathieu sensor.      BG RESULTS: Per meter report -   Ave B  Ave check per day: 2.6. Typically checks bg fasting and pre-meal. Sometimes checks at bedtime.   Highest reading 393  Lowest reading 58 ( this was the only episode of hypoglycemia which pt feels was caused by eating very little for dinner)     BG values are: Not in goal  Patient's most recent   Lab Results   Component Value Date    A1C 10.1 2022    A1C 8.9 2021    is not meeting goal of <7.0    Nutrition:  Patient eats 3 meals per day, Tends to snack throughout the day and likes to eat sweets daily. Known to sneak junk food.     Cultural/Episcopal diet restrictions: No    Biggest Challenge to Healthy Eating: emotional eating and snacking    Physical Activity:    Limitations: Uses a cane to help with balance.      Relevant co-morbidities and related health problems:  Significant for:  Depression  Mental/affective disorder - memory issues.   Obesity    Diabetes Complications:  Not discussed today.    Recent health service and resource utilization related to diabetes (hyperglycemia, hypoglycemia, etc):   None    Vitals:  There were no vitals taken for this visit.  Estimated body mass index is 37.86 kg/m  as calculated from the following:    Height as of 21: 1.575 m (5' 2\").    Weight as of 21: 93.9 kg (207 lb).   Last 3 BP:   BP Readings from Last 3 Encounters:   04/15/22 (!) 150/82   22 130/76   21 133/81       History   Smoking Status     Former Smoker     Packs/day: 0.00     Quit date: 2016   Smokeless Tobacco     Never Used       Labs:  Lab Results   Component Value Date    A1C 10.1 2022    A1C 8.9 2021     Lab Results   Component " "Value Date     04/22/2022     08/24/2020     Lab Results   Component Value Date    LDL 39 10/08/2021    LDL 26 06/18/2020     HDL Cholesterol   Date Value Ref Range Status   06/18/2020 78 >49 mg/dL Final     Direct Measure HDL   Date Value Ref Range Status   10/08/2021 76 >=50 mg/dL Final   ]  GFR Estimate   Date Value Ref Range Status   04/22/2022 81 >60 mL/min/1.73m2 Final     Comment:     Effective December 21, 2021 eGFRcr in adults is calculated using the 2021 CKD-EPI creatinine equation which includes age and gender (Isaac et al., NEJM, DOI: 10.1056/HQLZav4429021)   08/24/2020 >90 >60 mL/min/[1.73_m2] Final     Comment:     Non  GFR Calc  Starting 12/18/2018, serum creatinine based estimated GFR (eGFR) will be   calculated using the Chronic Kidney Disease Epidemiology Collaboration   (CKD-EPI) equation.       GFR Estimate If Black   Date Value Ref Range Status   08/24/2020 >90 >60 mL/min/[1.73_m2] Final     Comment:      GFR Calc  Starting 12/18/2018, serum creatinine based estimated GFR (eGFR) will be   calculated using the Chronic Kidney Disease Epidemiology Collaboration   (CKD-EPI) equation.       Lab Results   Component Value Date    CR 0.80 04/22/2022    CR 0.68 08/24/2020       Socio/Economic/Cultural Considerations:    Support system: children    Cultural Influences/Ethnic Background:  Not  or     Health Literacy/Numeracy:  \"With diabetes, it's helpful to use forms and log books to write down blood sugars and what you're eating at times to help understand how foods affect your blood sugars. With this in mind how confident are you at filling out medical forms, such as these, by yourself?  Not Assessed    Health Beliefs and Attitudes:   Patient Activation Measure Survey Score:  RADHA Score (Last Two) 9/15/2011 1/5/2012   RADHA Raw Score 30 35   Activation Score 37.3 45.2   RADHA Level 1 1       Diabetes knowledge and skills assessment:     Patient is " knowledgeable in diabetes management concepts related to: Monitoring and Taking Medication    Patient needs further education on the following diabetes management concepts: Healthy Eating, Being Active and Problem Solving    Barriers to Learning: Cognitive impairment    INTERVENTION:   Education provided today on:  Taking Medication:  - Reviewed diabetes medications; names, times to be taken take and dosages.  - Pt able to recall dose of Basaglar (45u daily in PM) and Novolog (6u with sm meal/12u with lg meal). States she takes 15u for a large meal if Bg > 300.    Opportunities for ongoing education and support in diabetes-self management were discussed.    Pt verbalized understanding of concepts discussed and recommendations provided today.       PLAN:  Taking Medication:   At last visit with Dr Medrano, pt was advised to stop Novolog insulin and start Glimepiride: 2mg, 1 tablet daily with breakfast. Pt verbalized she does not recall having this conversation with Dr Ervin. Cde noted in pt's chart that a prescription for Glimepiride was never sent to the pharmacy. This was done today.     - Pt and daughter advised to  Rx for Glimepiride today. Start Glimepiride tomorrow morning. Stop Novolog tomorrow morning. Take Novolog with lunch and dinner today.   - Continue to take Basaglar as prescribed  - Continue to take other diabetes medications as prescribed  - Eat three meals daily. Stop snacking betwween meals. Limit sweets to one small serving 3-4 times a week.        FOLLOW-UP:  Pt has f/u appt scheduled with Jeffrey in Jan.     Ongoing plan for education and support: Pt to rerun to see Cde in four weeks.     Time Spent: 45 minutes  Encounter Type: Individual    Any diabetes medication dose changes were made via the CDE Protocol and Collaborative Practice Agreement with the patient's endocrinology provider. A copy of this encounter was shared with the provider.    Holly Muir comes into clinic today at the  request of Dr Medrano, Ordering Provider for Pt Teaching and evaluation of Bg.     This service provided today was under the supervising provider of the day Dr Villalba, who was available if needed.    Mikayla Sellers, RN, BSN, [facility]   Certified Diabetes Care/  [facility]

## 2022-06-21 ENCOUNTER — TRANSFERRED RECORDS (OUTPATIENT)
Dept: HEALTH INFORMATION MANAGEMENT | Facility: CLINIC | Age: 67
End: 2022-06-21

## 2022-06-22 ENCOUNTER — OFFICE VISIT (OUTPATIENT)
Dept: AUDIOLOGY | Facility: CLINIC | Age: 67
End: 2022-06-22
Payer: COMMERCIAL

## 2022-06-22 DIAGNOSIS — H90.3 SENSORINEURAL HEARING LOSS (SNHL) OF BOTH EARS: Primary | ICD-10-CM

## 2022-06-22 DIAGNOSIS — Z79.4 TYPE 2 DIABETES MELLITUS WITH DIABETIC NEUROPATHY, WITH LONG-TERM CURRENT USE OF INSULIN (H): Primary | ICD-10-CM

## 2022-06-22 DIAGNOSIS — E11.40 TYPE 2 DIABETES MELLITUS WITH DIABETIC NEUROPATHY, WITH LONG-TERM CURRENT USE OF INSULIN (H): Primary | ICD-10-CM

## 2022-06-22 PROCEDURE — V5266 BATTERY FOR HEARING DEVICE: HCPCS | Performed by: AUDIOLOGIST

## 2022-06-22 PROCEDURE — 99207 PR NO CHARGE LOS: CPT | Performed by: AUDIOLOGIST

## 2022-06-22 RX ORDER — BLOOD-GLUCOSE METER
EACH MISCELLANEOUS
Qty: 1 KIT | Refills: 1 | Status: SHIPPED | OUTPATIENT
Start: 2022-06-22 | End: 2022-12-29

## 2022-06-22 RX ORDER — LANCETS 30 GAUGE
1 EACH MISCELLANEOUS 4 TIMES DAILY
Qty: 200 EACH | Refills: 3 | Status: SHIPPED | OUTPATIENT
Start: 2022-06-22 | End: 2022-06-29

## 2022-06-22 NOTE — PROGRESS NOTES
AUDIOLOGY REPORT    SUBJECTIVE:Holly Muir is a 67 year old female who was seen in the Audiology Clinic at the [facility] on 6/22/2022  for a follow-up check regarding the fitting of new hearing aids. Previous results have revealed a mild to moderate.  The patient has been seen previously in this clinic and was fit with Phonak Audeo  hearing aids on 6/01/22.  Holly reports good sound quality with the hearing aid(s). The patient reports that the earmolds have felt like they were loose a couple of times, but a good fit overall.     OBJECTIVE:     Reviewed 45 day trial period, care, cleaning (no water, dry brush), batteries (size 312) insertion/removal, toxicity, low-battery signal), aid insertion/removal, volume adjustment (if applicable), user booklet, warranty information, storage cases, and other hearing aid details.     Per guidelines of patient's insurance, 24 units of size 312 batteries were dispensed today. Reviewed that patent is eligable for batteries once every 90 days, and how they can request new batteries.    No charge visit today (in warranty hearing aid check).     ASSESSMENT: A follow-up appointment for hearing aid fitting was completed today. Changes to hearing aid was completed as outlined above.     PLAN:Holly will return for follow-up as needed, or at least every 4-6 months for cleaning and assessment of hearing aid.   The patient will keep us posted if she has any concerns with retention. Please call this clinic with any questions regarding today s appointment.    Cherie Powers.  Doctor of Audiology  [place] License # [id number]

## 2022-06-24 DIAGNOSIS — E11.40 TYPE 2 DIABETES MELLITUS WITH DIABETIC NEUROPATHY, WITH LONG-TERM CURRENT USE OF INSULIN (H): ICD-10-CM

## 2022-06-24 DIAGNOSIS — Z79.4 TYPE 2 DIABETES MELLITUS WITH DIABETIC NEUROPATHY, WITH LONG-TERM CURRENT USE OF INSULIN (H): ICD-10-CM

## 2022-06-24 NOTE — TELEPHONE ENCOUNTER
Writer spoke with pt. CDE met with t on 06/17/22. Discussed stopping Novolog with meals and starting Glimepiride per Dr Medrano's recommedations. Bg reviewed today.     06/23/22: F: 155, Pre L: 153, Pre D: 158  06/24: F: 93    Pt advised to continue taking recommended dose of Basaglar (45 units) daily. Continue taking Glimepiride, Actos and Metformin as prescribed. Okay to not resume Novolog. Pt verbalized understanding.     Mikayla Sellers, RN, BSN, [facility]   Certified Diabetes Care/  [facility]

## 2022-06-24 NOTE — TELEPHONE ENCOUNTER
Rx changed to state: check blood glucose 4 times daily.     Mikayla Sellers, RN, BSN, [facility]   Certified Diabetes Care/  [facility]

## 2022-06-28 ENCOUNTER — VIRTUAL VISIT (OUTPATIENT)
Dept: FAMILY MEDICINE | Facility: CLINIC | Age: 67
End: 2022-06-28
Payer: COMMERCIAL

## 2022-06-28 DIAGNOSIS — Z53.9 ERRONEOUS ENCOUNTER--DISREGARD: Primary | ICD-10-CM

## 2022-06-28 NOTE — PROGRESS NOTES
Holly is a 67 year old who is being evaluated via a billable telephone visit.      What phone number would you like to be contacted at? [phone]   How would you like to obtain your AVS? Dinesh Staley   Holly is a 67 year old presenting for the following health issues:  No chief complaint on file.      HPI     Chronic/Recurring Back Pain Follow Up      Where is your back pain located? (Select all that apply) middle of back right    How would you describe your back pain?  sharp    Where does your back pain spread? 'from one side to another'    Since your last clinic visit for back pain, how has your pain changed? rapidly worsening    Does your back pain interfere with your job? YES, No    Since your last visit, have you tried any new treatment? No, PT has not been started       How many servings of fruits and vegetables do you eat daily?  2-3    On average, how many sweetened beverages do you drink each day (Examples: soda, juice, sweet tea, etc.  Do NOT count diet or artificially sweetened beverages)?   3    How many days per week do you exercise enough to make your heart beat faster? 3 or less    How many minutes a day do you exercise enough to make your heart beat faster? 9 or less  How many days per week do you miss taking your medication? 2    What makes it hard for you to take your medications?  remembering to take        Review of Systems         Objective           Vitals:  No vitals were obtained today due to virtual visit.    Physical Exam

## 2022-06-28 NOTE — PROGRESS NOTES
I called the patient 3 times and left 3 voicemails, unable to connect with patient for today's phone visit.  This appointment was canceled.  We will reschedule for an in person visit.      This encounter was opened in error. Please disregard.

## 2022-06-29 ENCOUNTER — TELEPHONE (OUTPATIENT)
Dept: ENDOCRINOLOGY | Facility: CLINIC | Age: 67
End: 2022-06-29

## 2022-06-29 DIAGNOSIS — E11.40 TYPE 2 DIABETES MELLITUS WITH DIABETIC NEUROPATHY, WITH LONG-TERM CURRENT USE OF INSULIN (H): ICD-10-CM

## 2022-06-29 DIAGNOSIS — Z79.4 TYPE 2 DIABETES MELLITUS WITH DIABETIC NEUROPATHY, WITH LONG-TERM CURRENT USE OF INSULIN (H): ICD-10-CM

## 2022-06-29 RX ORDER — LANCETS 30 GAUGE
1 EACH MISCELLANEOUS 3 TIMES DAILY
Qty: 100 EACH | Refills: 11 | Status: SHIPPED | OUTPATIENT
Start: 2022-06-29 | End: 2023-05-21

## 2022-06-29 NOTE — TELEPHONE ENCOUNTER
Resent testing supplies for 3 times per day.  See telephone encounter.  Roseline Shukla RN, [facility]

## 2022-06-29 NOTE — TELEPHONE ENCOUNTER
Writer called the pharmacy to clarify what is needed as the refill requests had 2 different usage requests.     Per Juan, pharmacist, insurance will only cover  one touch delica lancets and one touch verio strips testing 3 times daily.    Will forward to CDE pool to authorize.    Yajaira Rajan, [facility]  Adult Endocrinology  [facility] [place]

## 2022-07-12 ENCOUNTER — THERAPY VISIT (OUTPATIENT)
Dept: PHYSICAL THERAPY | Facility: CLINIC | Age: 67
End: 2022-07-12
Attending: INTERNAL MEDICINE
Payer: COMMERCIAL

## 2022-07-12 DIAGNOSIS — M54.50 CHRONIC BILATERAL LOW BACK PAIN WITHOUT SCIATICA: ICD-10-CM

## 2022-07-12 DIAGNOSIS — G89.29 CHRONIC BILATERAL LOW BACK PAIN WITHOUT SCIATICA: ICD-10-CM

## 2022-07-12 PROCEDURE — 97110 THERAPEUTIC EXERCISES: CPT | Mod: GP | Performed by: PHYSICAL THERAPIST

## 2022-07-12 PROCEDURE — 97161 PT EVAL LOW COMPLEX 20 MIN: CPT | Mod: GP | Performed by: PHYSICAL THERAPIST

## 2022-07-12 NOTE — PROGRESS NOTES
Physical Therapy Initial Evaluation  Subjective:  The history is provided by the patient. No  was used.   Therapist Generated HPI Evaluation  Problem details: Holly saw Dr. King on 6/7/22 with c/o LBP.  She was diagnosed with chronic LBP without sciatica and was referred to PT for evaluation and treatment.  Holly reports that her back has been bothersome for along time, d/t memory loss she is not exactly sure when it started.  Chief complaints at this time: she id dependent on others, wants to help at home and with home care tasks and tend to her own needs, pain with walking, standing, sitting for prolonged periods of time.  .         Type of problem:  Lumbar.    This is a chronic condition.  Condition occurred with:  Insidious onset.  Where condition occurred: for unknown reasons.  Patient reports pain:  Lumbar spine right, lower lumbar spine and lumbar spine left.  Pain is described as sharp and is intermittent and constant (depends on the day).  Pain radiates to:  Gluteals right. Pain timing: activity-dependent, depends on the day.  Since onset symptoms are gradually worsening.  Associated symptoms:  Incontinence, loss of balance, numbness, loss of motion, loss of motion/stiffness, loss of strength and fatigue (incontinence being treated with neurostimulator, fell several months ago and near falls since then, numbness in both feet- possibly diabetes). Symptoms are exacerbated by sitting, walking, standing, lifting, carrying, twisting, lying down, bending and certain positions  and relieved by ice (readjusting with sitting).  Special tests included:  X-ray.    Work activity restrictions: would be volunteering if she felt better.  Barriers include:  Requires assistance with ADL's.    Patient Health History  Holly Muir being seen for low back pain.     Date of Onset: long time- memory loss so cannot be more specific.   Problem occurred: no reason   Pain is reported as 7/10 (R lower lumbar  spine) on pain scale.  General health as reported by patient is fair.  Pertinent medical history includes: cancer, depression, diabetes, high blood pressure, history of fractures, implanted device, incontinence, mental illness, migraines/headaches, numbness/tingling, osteoarthritis, overweight and sleep disorder/apnea (cervical cancer in 80's, fractures in wrist and leg (long time ago), neurostimulator for bladder in R buttock, cholosotmy bag, n/t in B feet, ).   Red flags:  None as reported by patient.  Medical allergies: morphine.   Surgeries: L breast tumor removal (benign)    Current medications:  Anti-depressants, anti-inflammatory, high blood pressure medication, other, sleep medication and steroids (baby aspririn, tramadol).    Current occupation is unemployed, lives with daughter.   Primary job tasks: unable to help with home tasks d/t back pain.                                    Objective:  System    Physical Exam      Pastor Lumbar Evaluation    Posture:  Sitting: fair  Standing: fair  Lordosis: Reduced  Lateral Shift: yes and right  Correction of Posture: better    Movement Loss:  Flexion (Flex): mod and pain  Extension (EXT): mod and pain  Side [place] R (SG R): min  Side Glide L (SG L): mod  Test Movements:    EIS: During: increases  After: no worse  Mechanical Response: no effect  Repeat EIS: During: centralizing  After: better  Mechanical Response: IncROM            Conclusion lumbar: derangement vs other.  Principle of Treatment:  Posture Correction: lumbar roll in sitting, while in car    Extension: BEN x10, every 2-3 hours or as needed for relief    Other: pt education regarding spine anatomy, rationale for chosen HEP and frequency of exercises, ok level of activity, ongoing POC                                   Musculoskeletal:        Back:        ROS    Assessment/Plan:    Patient is a 67 year old female with lumbar complaints.    Patient has the following significant findings with  corresponding treatment plan.                Diagnosis 1:  Chronic B LBP without sciatica  Pain -  hot/cold therapy, US, manual therapy, self management, education, directional preference exercise and home program  Decreased ROM/flexibility - manual therapy, therapeutic exercise and home program  Decreased joint mobility - manual therapy, therapeutic exercise and home program  Decreased strength - therapeutic exercise, therapeutic activities and home program  Impaired balance - neuro re-education, therapeutic activities and home program  Impaired gait - gait training and home program  Impaired muscle performance - neuro re-education and home program  Decreased function - therapeutic activities and home program  Impaired posture - neuro re-education and home program    Therapy Evaluation Codes:   1) History comprised of:   Personal factors that impact the plan of care:      None.    Comorbidity factors that impact the plan of care are:      Diabetes, Numbness/tingling, Osteoarthritis and Overweight.     Medications impacting care: None.  2) Examination of Body Systems comprised of:   Body structures and functions that impact the plan of care:      Lumbar spine.   Activity limitations that impact the plan of care are:      Bathing, Bending, Cooking, Driving, Dressing, Lifting, Sitting, Squatting/kneeling, Stairs, Standing, Walking, Sleeping and Laying down.  3) Clinical presentation characteristics are:   Stable/Uncomplicated.  4) Decision-Making    Low complexity using standardized patient assessment instrument and/or measureable assessment of functional outcome.  Cumulative Therapy Evaluation is: Low complexity.    Previous and current functional limitations:  (See Goal Flow Sheet for this information)    Short term and Long term goals: (See Goal Flow Sheet for this information)     Communication ability:  Patient appears to be able to clearly communicate and understand verbal and written communication and follow  directions correctly.  Treatment Explanation - The following has been discussed with the patient:   RX ordered/plan of care  Anticipated outcomes  Possible risks and side effects  This patient would benefit from PT intervention to resume normal activities.   Rehab potential is good.    Frequency:  1 X week, once daily  Duration:  for 8 weeks  Discharge Plan:  Achieve all LTG.  Independent in home treatment program.  Reach maximal therapeutic benefit.    Please refer to the daily flowsheet for treatment today, total treatment time and time spent performing 1:1 timed codes.

## 2022-07-12 NOTE — PROGRESS NOTES
[facility]    OUTPATIENT Physical Therapy ORTHOPEDIC EVALUATION  PLAN OF TREATMENT FOR OUTPATIENT REHABILITATION  (COMPLETE FOR INITIAL CLAIMS ONLY)  Patient's Last Name, First Name, M.I.  YOB: 1955  [place]  LORE    Provider s Name:  [facility]   Medical Record No.  [id number]   Start of Care Date:  07/12/22   Onset Date:   06/07/22 (MD order)   Type:     _X__PT   ___OT Medical Diagnosis:    Encounter Diagnosis   Name Primary?    Chronic bilateral low back pain without sciatica         Treatment Diagnosis:  chronic B (R>L) LBP without sciatica        Goals:     07/12/22 0500   Body Part   Goals listed below are for low back   Goal #1   Goal #1 ambulation   Previous Functional Level Minutes patient could walk   Performance Level 15-20 min   Current Functional Level Feet patient can walk   Performance Level 300 ft, 7+/10 pain   STG Target Performance Minutes patient will be able to walk   Performance Level 5 min, 5/10 pain or less   Rationale for safe household ambulation;for safe outdoor household ambulation;for safe community ambulation;to maintain proper body mechanics/posture while ambulating to avoid additional compensatory injury due to improper gait mechanics;to promote a healthy and active lifestyle   Due Date 08/09/22    LTG Target Performance Minutes patient will be able to  walk   Performance Level 10+ min, 3/10 pain or less   Rationale for safe household ambulation;for safe outdoor household ambulation;for safe community ambulation;to maintain proper body mechanics/posture while ambulating to avoid additional compensatory injury due to improper gait mechanics;to promote a healthy and active lifestyle   Due Date 09/06/22       Therapy Frequency:  1x/week  Predicted Duration of Therapy Intervention:  8 weeks    Елена Le PT                 I CERTIFY  THE NEED FOR THESE SERVICES FURNISHED UNDER        THIS PLAN OF TREATMENT AND WHILE UNDER MY CARE     (Physician attestation of this document indicates review and certification of the therapy plan).                     Certification Date From:  07/12/22   Certification Date To:  09/06/22    Referring Provider:  Tiffany King    Initial Assessment        See Epic Evaluation SOC Date: 07/12/22

## 2022-07-14 ENCOUNTER — OFFICE VISIT (OUTPATIENT)
Dept: FAMILY MEDICINE | Facility: CLINIC | Age: 67
End: 2022-07-14
Payer: COMMERCIAL

## 2022-07-14 VITALS
WEIGHT: 211.8 LBS | TEMPERATURE: 97.8 F | BODY MASS INDEX: 38.98 KG/M2 | DIASTOLIC BLOOD PRESSURE: 81 MMHG | OXYGEN SATURATION: 100 % | HEIGHT: 62 IN | RESPIRATION RATE: 18 BRPM | SYSTOLIC BLOOD PRESSURE: 138 MMHG | HEART RATE: 73 BPM

## 2022-07-14 DIAGNOSIS — Z23 HIGH PRIORITY FOR 2019-NCOV VACCINE: ICD-10-CM

## 2022-07-14 DIAGNOSIS — M25.562 ARTHRALGIA OF BOTH KNEES: ICD-10-CM

## 2022-07-14 DIAGNOSIS — E11.42 DIABETIC POLYNEUROPATHY ASSOCIATED WITH TYPE 2 DIABETES MELLITUS (H): ICD-10-CM

## 2022-07-14 DIAGNOSIS — M25.561 ARTHRALGIA OF BOTH KNEES: ICD-10-CM

## 2022-07-14 DIAGNOSIS — Z23 NEED FOR PNEUMOCOCCAL VACCINE: ICD-10-CM

## 2022-07-14 DIAGNOSIS — S92.352A CLOSED FRACTURE OF FIFTH METATARSAL BONE OF LEFT FOOT, PHYSEAL INVOLVEMENT UNSPECIFIED, INITIAL ENCOUNTER: ICD-10-CM

## 2022-07-14 DIAGNOSIS — M54.16 LUMBAR RADICULOPATHY: Primary | ICD-10-CM

## 2022-07-14 DIAGNOSIS — G25.81 RESTLESS LEG SYNDROME: ICD-10-CM

## 2022-07-14 LAB — HBA1C MFR BLD: 8.5 % (ref 0–5.6)

## 2022-07-14 PROCEDURE — 0054A COVID-19,PF,PFIZER (12+ YRS): CPT | Performed by: INTERNAL MEDICINE

## 2022-07-14 PROCEDURE — 36415 COLL VENOUS BLD VENIPUNCTURE: CPT | Performed by: INTERNAL MEDICINE

## 2022-07-14 PROCEDURE — 83036 HEMOGLOBIN GLYCOSYLATED A1C: CPT | Performed by: INTERNAL MEDICINE

## 2022-07-14 PROCEDURE — G0009 ADMIN PNEUMOCOCCAL VACCINE: HCPCS | Performed by: INTERNAL MEDICINE

## 2022-07-14 PROCEDURE — 90677 PCV20 VACCINE IM: CPT | Performed by: INTERNAL MEDICINE

## 2022-07-14 PROCEDURE — 99214 OFFICE O/P EST MOD 30 MIN: CPT | Mod: 25 | Performed by: INTERNAL MEDICINE

## 2022-07-14 PROCEDURE — 91305 COVID-19,PF,PFIZER (12+ YRS): CPT | Performed by: INTERNAL MEDICINE

## 2022-07-14 RX ORDER — GABAPENTIN 600 MG/1
1200 TABLET ORAL 3 TIMES DAILY
Qty: 180 TABLET | Refills: 5 | Status: SHIPPED | OUTPATIENT
Start: 2022-07-14 | End: 2022-07-14

## 2022-07-14 RX ORDER — CELECOXIB 100 MG/1
100 CAPSULE ORAL 2 TIMES DAILY
Qty: 60 CAPSULE | Refills: 1 | Status: SHIPPED | OUTPATIENT
Start: 2022-07-14 | End: 2022-08-31

## 2022-07-14 RX ORDER — GABAPENTIN 600 MG/1
1200 TABLET ORAL 3 TIMES DAILY
Qty: 180 TABLET | Refills: 5 | Status: SHIPPED | OUTPATIENT
Start: 2022-07-14 | End: 2022-12-16

## 2022-07-14 ASSESSMENT — PATIENT HEALTH QUESTIONNAIRE - PHQ9
SUM OF ALL RESPONSES TO PHQ QUESTIONS 1-9: 2
10. IF YOU CHECKED OFF ANY PROBLEMS, HOW DIFFICULT HAVE THESE PROBLEMS MADE IT FOR YOU TO DO YOUR WORK, TAKE CARE OF THINGS AT HOME, OR GET ALONG WITH OTHER PEOPLE: NOT DIFFICULT AT ALL
SUM OF ALL RESPONSES TO PHQ QUESTIONS 1-9: 2

## 2022-07-14 ASSESSMENT — PAIN SCALES - GENERAL: PAINLEVEL: NO PAIN (0)

## 2022-07-14 NOTE — PROGRESS NOTES
Assessment & Plan     Holly was seen today for back pain and imm/inj.    Diagnoses and all orders for this visit:    Need for pneumococcal vaccine  -     Pneumococcal 20 Valent Conjugate (PCV20)    Lumbar radiculopathy  -     celecoxib (CELEBREX) 100 MG capsule; Take 1 capsule (100 mg) by mouth 2 times daily    Arthralgia of both knees  Comments:  uses Tramadol < 3x/day for now, will reduce dose to bid prn and follow refill trend. spread out day time gabapentin to 600 mg bid, 1200 mg bedtime  Orders:  -     Discontinue: gabapentin (NEURONTIN) 600 MG tablet; Take 2 tablets (1,200 mg) by mouth 3 times daily for 30 days  -     gabapentin (NEURONTIN) 600 MG tablet; Take 2 tablets (1,200 mg) by mouth 3 times daily    Closed fracture of fifth metatarsal bone of left foot, physeal involvement unspecified, initial encounter  -     Discontinue: gabapentin (NEURONTIN) 600 MG tablet; Take 2 tablets (1,200 mg) by mouth 3 times daily for 30 days  -     gabapentin (NEURONTIN) 600 MG tablet; Take 2 tablets (1,200 mg) by mouth 3 times daily    Restless leg syndrome  Comments:  gabapentin  Orders:  -     Discontinue: gabapentin (NEURONTIN) 600 MG tablet; Take 2 tablets (1,200 mg) by mouth 3 times daily for 30 days  -     gabapentin (NEURONTIN) 600 MG tablet; Take 2 tablets (1,200 mg) by mouth 3 times daily    High priority for 2019-nCoV vaccine  -     COVID-19,PF,PFIZER (12+ Yrs GRAY LABEL)    Diabetic polyneuropathy associated with type 2 diabetes mellitus (H)  -     Hemoglobin A1c; Future  -     Hemoglobin A1c         Worsening back pain possibly due to stopping Celebrex which may have helped her pain level the month she took it. We'll have her try a higher dose to see if it helps.    Can also have her take higher dose of gabapentin for the time being until she gets back with sports medicine.     May continue with tramadol prn for breakthrough pain. This is not particular effective but may offer some adjunct pain relief.      Return in about 3 months (around 10/14/2022) for wellness visit, Fasting Lab appointment.    Tiffany King MD PhD  [facility] LARRY Barrera is a 67 year old, presenting for the following health issues:  Back Pain (Follow up ) and Imm/Inj (COVID-19 VACCINE)      History of Present Illness       Reason for visit:  Follow up    She eats 2-3 servings of fruits and vegetables daily.She consumes 2 sweetened beverage(s) daily.She exercises with enough effort to increase her heart rate 9 or less minutes per day.  She exercises with enough effort to increase her heart rate 3 or less days per week.     Today's PHQ-9         PHQ-9 Total Score: 2    PHQ-9 Q9 Thoughts of better off dead/self-harm past 2 weeks :   Not at all    How difficult have these problems made it for you to do your work, take care of things at home, or get along with other people: Not difficult at all       Pain History:  When did you first notice your pain? - Chronic Pain   Have you seen this provider for your pain in the past?   Yes   Where in your body do you have pain? Back Pain  Are you seeing anyone else for your pain? Yes - PT and Santy Harrington     She had an epidural injection recently but it didn't help as much as before. She has placed a call to Dr. Harrington's office regarding the next step.     At her last visit, I gave her Celebrex for 30 days with 1 refill. Pt was not aware of the refill and didn't refill it. Her pain was better controlled until about 3 weeks ago. It started to get worse.     She is also on gabapentin. Taking Tramadol didn't help.     PHQ-9 SCORE 4/21/2022 5/24/2022 7/14/2022   PHQ-9 Total Score - - -   PHQ-9 Total Score MyChart 14 (Moderate depression) - 2 (Minimal depression)   PHQ-9 Total Score 14 15 2   PHQ-9 External Data - - -       ISABELA-7 SCORE 7/29/2019 3/31/2021 5/24/2022   Total Score - - -   Total Score 0 12 17         PDMP Review       Value Time User    State PDMP site checked  Yes  "5/24/2022  1:11 PM Tiffany King MD PhD        Last CSA Agreement:   CSA -- Patient Level:    Controlled Substance Agreement - Opioid - Scan on 4/15/2021  9:53 AM       Last UDS: 10/12/2021        Review of Systems   Constitutional, HEENT, cardiovascular, pulmonary, gi and gu systems are negative, except as otherwise noted.      Objective    /81 (BP Location: Left arm, Patient Position: Sitting, Cuff Size: Adult Regular)   Pulse 73   Temp 97.8  F (36.6  C) (Oral)   Resp 18   Ht 1.575 m (5' 2\")   Wt 96.1 kg (211 lb 12.8 oz)   SpO2 100%   BMI 38.74 kg/m    Body mass index is 38.74 kg/m .  Physical Exam   GENERAL APPEARANCE: healthy, alert and no distress            .  ..  "

## 2022-07-14 NOTE — PROGRESS NOTES
Can't make bed. Can't stand to wash dishes.   Sometimes the pain went to the right buttock.   Sees sports med 7/27.

## 2022-07-14 NOTE — NURSING NOTE
Screening Questionnaire for Adult Immunization     Are you sick today?   No    Do you have allergies to medications, food or any vaccine?   No    Have you ever had a serious reaction after receiving a vaccination?   No    Do you have a long-term health problem with heart disease, lung disease,  asthma, kidney disease, diabetes, anemia, metabolic or blood disease?   No    Do you have cancer, leukemia, AIDS, or any immune system problem?   No    Do you take cortisone, prednisone, other steroids, or anticancer drugs, or  have you had any x-ray (radiation) treatments?   No    Have you had a seizure, brain, or other nervous system problem?   No    During the past year, have you received a transfusion of blood or blood       products, or been given a medicine called immune (gamma) globulin?   No    For women: Are you pregnant or is there a chance you could become         pregnant during the next month?   No    Have you received any vaccinations in the past 4 weeks?   No     Immunization questionnaire answers were all negative.     Screening performed by Maria T Elias CMA on 7/14/2022 at 4:58 PM.    Prior to injection verified patient identity using patient's name and date of birth. Patient instructed to remain in clinic for 15 minutes afterwards, and to report any adverse reaction to staff mmediately.

## 2022-07-14 NOTE — PATIENT INSTRUCTIONS
Additional instructions:  Celebrex is for pain. Gabapentin dose is increased to 1200 mg three times a day.

## 2022-07-15 NOTE — RESULT ENCOUNTER NOTE
Dear Holly,   Your recent test results showed the following:  -- A1c much improved. Continue your current medications and follow up with Mikayla and Dr. Medrano as planned.     Please call or Mychart to our office if you have further questions.     Tiffany King MD-PhD

## 2022-07-19 ENCOUNTER — THERAPY VISIT (OUTPATIENT)
Dept: PHYSICAL THERAPY | Facility: CLINIC | Age: 67
End: 2022-07-19
Payer: COMMERCIAL

## 2022-07-19 DIAGNOSIS — M54.50 CHRONIC BILATERAL LOW BACK PAIN WITHOUT SCIATICA: Primary | ICD-10-CM

## 2022-07-19 DIAGNOSIS — G89.29 CHRONIC BILATERAL LOW BACK PAIN WITHOUT SCIATICA: Primary | ICD-10-CM

## 2022-07-19 PROCEDURE — 97110 THERAPEUTIC EXERCISES: CPT | Mod: GP | Performed by: PHYSICAL THERAPIST

## 2022-07-21 ENCOUNTER — HOSPITAL ENCOUNTER (OUTPATIENT)
Dept: PHYSICAL THERAPY | Facility: CLINIC | Age: 67
Setting detail: THERAPIES SERIES
Discharge: HOME | End: 2022-07-21
Attending: OTOLARYNGOLOGY
Payer: COMMERCIAL

## 2022-07-21 ENCOUNTER — OFFICE VISIT (OUTPATIENT)
Dept: ORTHOPEDICS | Facility: CLINIC | Age: 67
End: 2022-07-21
Payer: COMMERCIAL

## 2022-07-21 DIAGNOSIS — M47.896 OTHER SPONDYLOSIS, LUMBAR REGION: ICD-10-CM

## 2022-07-21 DIAGNOSIS — G89.29 CHRONIC BILATERAL LOW BACK PAIN WITHOUT SCIATICA: Primary | ICD-10-CM

## 2022-07-21 DIAGNOSIS — M54.50 CHRONIC BILATERAL LOW BACK PAIN WITHOUT SCIATICA: Primary | ICD-10-CM

## 2022-07-21 PROCEDURE — 20610 DRAIN/INJ JOINT/BURSA W/O US: CPT | Mod: 50 | Performed by: FAMILY MEDICINE

## 2022-07-21 PROCEDURE — 97112 NEUROMUSCULAR REEDUCATION: CPT | Mod: GP | Performed by: PHYSICAL THERAPIST

## 2022-07-21 PROCEDURE — 97116 GAIT TRAINING THERAPY: CPT | Mod: GP | Performed by: PHYSICAL THERAPIST

## 2022-07-21 PROCEDURE — 99214 OFFICE O/P EST MOD 30 MIN: CPT | Mod: 25 | Performed by: FAMILY MEDICINE

## 2022-07-21 RX ORDER — TRIAMCINOLONE ACETONIDE 40 MG/ML
40 INJECTION, SUSPENSION INTRA-ARTICULAR; INTRAMUSCULAR
Status: DISCONTINUED | OUTPATIENT
Start: 2022-07-21 | End: 2022-09-14

## 2022-07-21 RX ADMIN — TRIAMCINOLONE ACETONIDE 40 MG: 40 INJECTION, SUSPENSION INTRA-ARTICULAR; INTRAMUSCULAR at 12:03

## 2022-07-21 NOTE — PATIENT INSTRUCTIONS
Thanks for coming today.  Ortho[facility] Medicine Clinic  [address] [place] [address]    To schedule future appointments in Ortho Clinic, you may call [phone].    To schedule ordered imaging by your Provider: Call [facility] at [phone]    [employer] available online at:   [email or web address] [place]    Please call if any further questions or concerns [phone] and ask for the Orthopedic Department. Clinic hours 8 am to 5 pm.    Return to clinic if symptoms worsen.

## 2022-07-21 NOTE — LETTER
7/21/2022         RE: Holly Muir  [address] [facility] [id number]        Dear Colleague,    Thank you for referring your patient, Holly Muir, to the [facility] [place]. Please see a copy of my visit note below.    HISTORY OF PRESENT ILLNESS  Ms. Muir is a pleasant 67 year old female following up with bilateral knee osteoarthritis.  Holly is here for knee injections.  She also continues to have chronic back pain.  Her last injection did not help her very much at all unfortunately.  This was a right L4-5 injection.     PHYSICAL EXAM  General  - normal appearance, in no obvious distress  HEENT  - conjunctivae not injected, moist mucous membranes  CV  - normal peripheral perfusion  Pulm  - normal respiratory pattern, non-labored  Musculoskeletal - lumbar spine, knees  - stance: slow to rise and sit  - inspection: normal bone and joint alignment, no obvious scoliosis  - palpation: bilateral lumbar paravertebral spasm, bilateral medial joint line tenderness in knees  - strength: lower extremities 5/5 in all planes    Neuro  - patellar and Achilles DTRs 2+ bilaterally, no sensory or motor deficit, grossly normal coordination, normal muscle tone  Skin  - no ecchymosis, erythema, warmth, or induration, no obvious rash  Psych  - interactive, appropriate, normal mood and affect        ASSESSMENT & PLAN  Ms. Muir is a 67 year old female following up with bilateral knee osteoarthritis and chronic low back pain    I did repeat her knee injections today (see procedure note).    I reviewed her most recent CT in the room with her, this does show multiple levels of facet osteoarthritis.  I am also again referring her for a injection, this 1 will be a facet injection at L4-5.  Hopefully this helps with her pain, if this does not help whatsoever with her pain she should let me know approximately 2 to 3 weeks after the injection.    It was a pleasure seeing  Shahana Harrington DO, CAQSM      This note was constructed using Dragon dictation software, please excuse any minor errors in spelling, grammar, or syntax.        Large Joint Injection/Arthocentesis: bilateral knee    Date/Time: 7/21/2022 12:03 PM  Performed by: Santy Harrington DO  Authorized by: Santy Harrington DO     Indications:  Pain  Needle Size:  22 G  Guidance: landmark guided    Approach:  Lateral  Location:  Knee  Laterality:  Bilateral      Medications (Right):  40 mg triamcinolone 40 MG/ML  Medications (Left):  40 mg triamcinolone 40 MG/ML  Outcome:  Tolerated well, no immediate complications  Procedure discussed: discussed risks, benefits, and alternatives    Consent Given by:  Patient  Timeout: timeout called immediately prior to procedure    Prep: patient was prepped and draped in usual sterile fashion         PROCEDURE    Knee Injections - Intraarticular    The patient was informed of the risks and the benefits of the procedure and a written consent was signed.    The patient's left knee was prepped with chlorhexidine in sterile fashion.   40 mg of triamcinolone suspension was drawn up into a 5 mL syringe with 4 mL of 1% lidocaine.  Injection was performed using substerile technique.  A 1.5-inch 22-gauge needle was used to enter the lateral aspect of the left knee.  Injection performed successfully without difficulty.  There were no complications. The patient tolerated the procedure well. There was negligible bleeding.     The patient's right knee was prepped with chlorhexidine in sterile fashion.   40 mg of triamcinolone suspension was drawn up into a 5 mL syringe with 4 mL of 1% lidocaine.  Injection was performed using substerile technique.  A 1.5-inch 22-gauge needle was used to enter the lateral aspect of the right knee.  Injection performed successfully without difficulty.  There were no complications. The patient tolerated the procedure well. There was negligible bleeding.                  Again, thank you for allowing me to participate in the care of your patient.        Sincerely,        Santy Harrington, DO

## 2022-07-21 NOTE — PROGRESS NOTES
HISTORY OF PRESENT ILLNESS  Ms. Muir is a pleasant 67 year old female following up with bilateral knee osteoarthritis.  Holly is here for knee injections.  She also continues to have chronic back pain.  Her last injection did not help her very much at all unfortunately.  This was a right L4-5 injection.     PHYSICAL EXAM  General  - normal appearance, in no obvious distress  HEENT  - conjunctivae not injected, moist mucous membranes  CV  - normal peripheral perfusion  Pulm  - normal respiratory pattern, non-labored  Musculoskeletal - lumbar spine, knees  - stance: slow to rise and sit  - inspection: normal bone and joint alignment, no obvious scoliosis  - palpation: bilateral lumbar paravertebral spasm, bilateral medial joint line tenderness in knees  - strength: lower extremities 5/5 in all planes    Neuro  - patellar and Achilles DTRs 2+ bilaterally, no sensory or motor deficit, grossly normal coordination, normal muscle tone  Skin  - no ecchymosis, erythema, warmth, or induration, no obvious rash  Psych  - interactive, appropriate, normal mood and affect        ASSESSMENT & PLAN  Ms. Muir is a 67 year old female following up with bilateral knee osteoarthritis and chronic low back pain    I did repeat her knee injections today (see procedure note).    I reviewed her most recent CT in the room with her, this does show multiple levels of facet osteoarthritis.  I am also again referring her for a injection, this 1 will be a facet injection at L4-5.  Hopefully this helps with her pain, if this does not help whatsoever with her pain she should let me know approximately 2 to 3 weeks after the injection.    It was a pleasure seeing Holly.        Santy Harrington DO, CAQSM      This note was constructed using Dragon dictation software, please excuse any minor errors in spelling, grammar, or syntax.        Large Joint Injection/Arthocentesis: bilateral knee    Date/Time: 7/21/2022 12:03 PM  Performed by: Santy Harrington  DO Aldo  Authorized by: Santy Harrington DO     Indications:  Pain  Needle Size:  22 G  Guidance: landmark guided    Approach:  Lateral  Location:  Knee  Laterality:  Bilateral      Medications (Right):  40 mg triamcinolone 40 MG/ML  Medications (Left):  40 mg triamcinolone 40 MG/ML  Outcome:  Tolerated well, no immediate complications  Procedure discussed: discussed risks, benefits, and alternatives    Consent Given by:  Patient  Timeout: timeout called immediately prior to procedure    Prep: patient was prepped and draped in usual sterile fashion         PROCEDURE    Knee Injections - Intraarticular    The patient was informed of the risks and the benefits of the procedure and a written consent was signed.    The patient's left knee was prepped with chlorhexidine in sterile fashion.   40 mg of triamcinolone suspension was drawn up into a 5 mL syringe with 4 mL of 1% lidocaine.  Injection was performed using substerile technique.  A 1.5-inch 22-gauge needle was used to enter the lateral aspect of the left knee.  Injection performed successfully without difficulty.  There were no complications. The patient tolerated the procedure well. There was negligible bleeding.     The patient's right knee was prepped with chlorhexidine in sterile fashion.   40 mg of triamcinolone suspension was drawn up into a 5 mL syringe with 4 mL of 1% lidocaine.  Injection was performed using substerile technique.  A 1.5-inch 22-gauge needle was used to enter the lateral aspect of the right knee.  Injection performed successfully without difficulty.  There were no complications. The patient tolerated the procedure well. There was negligible bleeding.

## 2022-07-22 ENCOUNTER — OFFICE VISIT (OUTPATIENT)
Dept: OPHTHALMOLOGY | Facility: CLINIC | Age: 67
End: 2022-07-22
Attending: OPHTHALMOLOGY
Payer: COMMERCIAL

## 2022-07-22 DIAGNOSIS — Z96.1 PSEUDOPHAKIA OF BOTH EYES: ICD-10-CM

## 2022-07-22 DIAGNOSIS — E11.3293 MILD NONPROLIFERATIVE DIABETIC RETINOPATHY OF BOTH EYES ASSOCIATED WITH TYPE 2 DIABETES MELLITUS, MACULAR EDEMA PRESENCE UNSPECIFIED (H): ICD-10-CM

## 2022-07-22 DIAGNOSIS — H26.493 POSTERIOR CAPSULAR OPACIFICATION VISUALLY SIGNIFICANT OF BOTH EYES: Primary | ICD-10-CM

## 2022-07-22 PROCEDURE — 99024 POSTOP FOLLOW-UP VISIT: CPT | Performed by: OPHTHALMOLOGY

## 2022-07-22 PROCEDURE — G0463 HOSPITAL OUTPT CLINIC VISIT: HCPCS

## 2022-07-22 ASSESSMENT — CUP TO DISC RATIO
OS_RATIO: 0.2
OD_RATIO: 0.2

## 2022-07-22 ASSESSMENT — REFRACTION_WEARINGRX
OD_ADD: +2.75
OD_SPHERE: -1.00
OS_AXIS: 129
OD_HPRISM: 2.5
OS_ADD: +2.75
OD_AXIS: 034
SPECS_TYPE: BIFOCAL
OS_HBASE: IN
OD_HBASE: IN
OS_CYLINDER: +0.50
OD_CYLINDER: +2.00
OS_SPHERE: -0.25
OS_HPRISM: 2.5

## 2022-07-22 ASSESSMENT — SLIT LAMP EXAM - LIDS
COMMENTS: NORMAL
COMMENTS: NORMAL

## 2022-07-22 ASSESSMENT — TONOMETRY
OD_IOP_MMHG: 13
OS_IOP_MMHG: 16
IOP_METHOD: ICARE

## 2022-07-22 ASSESSMENT — VISUAL ACUITY
OD_CC: 20/25
OS_CC+: -1
OS_CC: 20/25
METHOD: SNELLEN - LINEAR

## 2022-07-22 ASSESSMENT — CONF VISUAL FIELD
METHOD: COUNTING FINGERS
OD_NORMAL: 1
OS_NORMAL: 1

## 2022-07-22 ASSESSMENT — EXTERNAL EXAM - LEFT EYE: OS_EXAM: NORMAL

## 2022-07-22 ASSESSMENT — EXTERNAL EXAM - RIGHT EYE: OD_EXAM: NORMAL

## 2022-07-22 NOTE — PROGRESS NOTES
HPI:  Holly Muir is a 67 year old female presenting for follow up right eye YAG.    Last visit 5/9/22 had right eye YAG. Lost to follow up since then. Several cancelled appointments.  Right eye laser helped. Notices a new floater since the procedure that moves around with eye movements. Can see clearly around the floater, only sees it sometimes not always. Left eye vision is good. Has little lights in vision both eyes, look like sparkles that last for less than a second, unsure how long this has been ongoing, thinks it might be related to being dizzy/not eating. These are longstanding and unchanging. No flashes.    Using - ATs prn    Past Ocular History:  Pseudophakia each eye  PCO each eye s/p YAG left eye 4/26/22; right eye 5/9/22  Mild NPDR  Diplopia corrected with prisms    PMH:  DM2    SH:  Former smoker, quit 2016    FH:  No known family history of blindness, glaucoma, macular degeneration    ASSESSMENT and PLAN:  1. Posterior capsular opacification visually significant of both eyes  - s/p left eye YAG 4/26/22 with open PC centrally with inferior flap and resolution of symptoms; retina attached  - s/p right eye YAG 5/9/22 with open PC, free-floating remnant inf and posterior with intermittent floater; retina attached  - discussed floater; discussed may settle down with time/gravity; if worsening or persistent can consider YAG to move it out of the way; will monitor for now    2. Pseudophakia of both eyes  - lenses in good position    3. Mild nonproliferative diabetic retinopathy of both eyes associated with type 2 diabetes mellitus, macular edema presence unspecified (H)  - last A1c 8.5 on 7/14/22, improved from 10.1 on 4/22/22  - mild retinopathy on exam today  - again encouraged BS/BP control  - annual DFE      Follow up in 1 year or sooner PRN        -----------------------------------------------------------------------------------    Attestation:  Complete documentation of historical and exam  elements from today's encounter can be found in the full encounter summary report (not reduplicated in this progress note). I personally obtained the chief complaint(s) and history of present illness.  I confirmed and edited as necessary the review of systems, past medical/surgical history, family history, social history, and examination findings as documented by others; and I examined the patient myself. I personally reviewed the relevant tests, images, and reports as documented above.     I formulated and edited as necessary the assessment and plan and discussed the findings and management plan with the patient and family.      Sofai Morales MD

## 2022-07-26 ENCOUNTER — HOSPITAL ENCOUNTER (OUTPATIENT)
Dept: PHYSICAL THERAPY | Facility: CLINIC | Age: 67
Setting detail: THERAPIES SERIES
Discharge: HOME | End: 2022-07-26
Attending: OTOLARYNGOLOGY
Payer: COMMERCIAL

## 2022-07-26 ENCOUNTER — THERAPY VISIT (OUTPATIENT)
Dept: PHYSICAL THERAPY | Facility: CLINIC | Age: 67
End: 2022-07-26
Payer: COMMERCIAL

## 2022-07-26 ENCOUNTER — ALLIED HEALTH/NURSE VISIT (OUTPATIENT)
Dept: EDUCATION SERVICES | Facility: CLINIC | Age: 67
End: 2022-07-26
Payer: COMMERCIAL

## 2022-07-26 DIAGNOSIS — E11.40 TYPE 2 DIABETES MELLITUS WITH DIABETIC NEUROPATHY (H): Primary | ICD-10-CM

## 2022-07-26 DIAGNOSIS — G89.29 CHRONIC BILATERAL LOW BACK PAIN WITHOUT SCIATICA: Primary | ICD-10-CM

## 2022-07-26 DIAGNOSIS — M54.50 CHRONIC BILATERAL LOW BACK PAIN WITHOUT SCIATICA: Primary | ICD-10-CM

## 2022-07-26 PROCEDURE — 97116 GAIT TRAINING THERAPY: CPT | Mod: GP | Performed by: PHYSICAL THERAPIST

## 2022-07-26 PROCEDURE — 97112 NEUROMUSCULAR REEDUCATION: CPT | Mod: GP | Performed by: PHYSICAL THERAPIST

## 2022-07-26 PROCEDURE — G0108 DIAB MANAGE TRN  PER INDIV: HCPCS

## 2022-07-26 PROCEDURE — 97110 THERAPEUTIC EXERCISES: CPT | Mod: GP | Performed by: PHYSICAL THERAPIST

## 2022-08-02 ENCOUNTER — THERAPY VISIT (OUTPATIENT)
Dept: PHYSICAL THERAPY | Facility: CLINIC | Age: 67
End: 2022-08-02
Payer: COMMERCIAL

## 2022-08-02 ENCOUNTER — HOSPITAL ENCOUNTER (OUTPATIENT)
Dept: PHYSICAL THERAPY | Facility: CLINIC | Age: 67
Setting detail: THERAPIES SERIES
Discharge: HOME | End: 2022-08-02
Attending: OTOLARYNGOLOGY
Payer: COMMERCIAL

## 2022-08-02 DIAGNOSIS — M54.50 CHRONIC BILATERAL LOW BACK PAIN WITHOUT SCIATICA: Primary | ICD-10-CM

## 2022-08-02 DIAGNOSIS — G89.29 CHRONIC BILATERAL LOW BACK PAIN WITHOUT SCIATICA: Primary | ICD-10-CM

## 2022-08-02 PROCEDURE — 97110 THERAPEUTIC EXERCISES: CPT | Mod: GP | Performed by: PHYSICAL THERAPIST

## 2022-08-02 PROCEDURE — 97112 NEUROMUSCULAR REEDUCATION: CPT | Mod: GP | Performed by: PHYSICAL THERAPIST

## 2022-08-02 PROCEDURE — 97750 PHYSICAL PERFORMANCE TEST: CPT | Mod: GP | Performed by: PHYSICAL THERAPIST

## 2022-08-02 NOTE — PROGRESS NOTES
Dynamic Gait Index (DGI):The DGI is a measure of balance during gait that is reliable and valid for the elderly and individuals with Parkinson's disease, MS, vestibular disorders, or s/p stroke. Gait assistive device used: None    Scores ?19 /24 indicate an increased risk for falls according to Rebecca et al 2000  Minimal Detectable Change = 2.9 in community dwelling elderly according to Rafael et al 2011    Assessment (rationale for performing, application to patient s function & care plan): Performed to assess balance with gait. Used her SEC during performance. She scores a 19/24 and denies any increase in dizziness which places her at lower risk of falls today.   (Minutes billed as physical performance test): 8         08/02/22 1100   Signing Clinician's Name / Credentials   Signing clinician's name / credentials Mahnaz New PT, DPT   Dynamic Gait Index (Dereck and Padilla Marva, 1995)   Gait Level Surface 2   Change in Gait Speed 2   Gait and Horizontal Head Turns 3   Gait with Vertical Head Turns 2   Gait and Pivot Turns 2   Step Over Obstacle 3   Step Around Obstacles 3   Steps 2   Total Dynamic Gait Index Score  (A score of 19 or less has been correlated to an increased risk of falls in community dwelling older adults, patients with vestibular disorders, and patients with MS.)   Total Score (out of 24) 19     Timed Up and Go (TUG): TUG is a test of basic mobility skills. It is used to screen individuals prone to falls.  Gait assistive device used: SEC     Patient score 14.5 seconds  ?13.5 seconds indicate at risk for falls in older adults according to Manuela et al 2000.  ?30 seconds - indicates dependency in most ADL and mobility skills according to Posiadlo & Mckee 1991  >11.5 seconds indicate at risk for falls in adults with Parkinson's Disease    Minimal Detectable Change for patients with Alzheimer s = 4.09 sec   Minimal Detectable Change for patients with Parkinson s Disease = 3.5  sec   according to Anish & Michelle Diaz 2011    Assessment (rationale for performing, application to patient s function & care plan):   (Minutes billed as physical performance test)

## 2022-08-08 NOTE — PROGRESS NOTES
Diabetes Self Management Training: Follow-up Visit    Holly Muir presents today for evaluation of glucose control Type 2 diabetes.    She is accompanied by self    Patient's diabetes management related comments/concerns: None at this time.     Patient would like this visit to be focused around the following diabetes-related behaviors and goals: Improving bg conrtol.     ASSESSMENT:  Patient referred to [facility], in June, by her Pcp, Dr King, for review of diabetes self mgmt care and blood glucose levels. Holly was diagnosed with Type 2 Diabetes in 2002. She alternates living with two daughters, Cata and Emilia (Raymundo). She is currently living with Raymundo who is quite knowledgeable about diabetes and its mgmt. Since diagnosis, Holly has struggled with self mgmt care due to mental health issues and poor diet. She has a history of depression, disordered sleep patterns and craves sweets. She complaines today of feeling tried all the time. Holly returns to clinic today for bg and medication review.     Current Diabetes Management per Patient:  Taking diabetes medications? Yes: see below     Diabetes Medication(s)     Biguanides       metFORMIN (GLUCOPHAGE) 1000 MG tablet    Take 1 tablet (1,000 mg) by mouth 2 times daily (with meals) For additional refills, please schedule a follow-up appointment, lab due    Insulin       insulin aspart (NOVOLOG FLEXPEN) 100 UNIT/ML pen    12 UNITS SUBCUTANEOUS PER MEAL, THREE TIMES A DAY.     Patient taking differently: 12 UNITS SUBCUTANEOUS PER MEAL, THREE TIMES A DAY. (6 units if a small meal, 12 units if a large meal)     insulin glargine (BASAGLAR KWIKPEN) 100 UNIT/ML pen    TAKE 56 UNITS SUBCUTANEOUSLY DAILY.    Sulfonylureas       glimepiride (AMARYL) 2 MG tablet    Take 1 tablet (2 mg) by mouth every morning (before breakfast)    Insulin Sensitizing Agents       pioglitazone (ACTOS) 45 MG tablet    Take 1 tablet (45 mg) by mouth daily          Do you have any difficulty  "affording your medications or glucose monitoring supplies?  No     Patient glucose self monitoring as follows: Ave of 1.6x/day per meter report. Meter: One Touch Ultra 2.     BG RESULTS: Per meter report:    Ave B. Highest readin. Lowest readin.   47% readings in target. 53% above target. 0% below target.     BG values are: Not in goal  Patient's most recent   Lab Results   Component Value Date    A1C 8.5 2022    A1C 8.9 2021    is not meeting goal of <7.0    Nutrition:  Patient states she has gotten to the point where she rarely eats during the middle of the night but stills likes to snack during the day. States it is hard for her not to snack. She always craves sweets. She is aware that when she eats poorly her bg goes up and when she eats well it goes down.      Typically goes to bed around 9pm Does not fall asleep right away. Typically wakes up around 12pm. Feels tied most of the time.     Breakfast: oatmeal and fruit, when eating well. Pastries when not eating well.   Lunch: sandwich or soup  Dinner: Rice with meat and a vegetable (when not eating well, serves herself a large portion of rice)  Snacks: when eating well: fruit. When not eating well: sweets    Drinks water and/or diet d soda.     Biggest Challenge to Healthy Eating: emotional eating/snacking    Physical Activity:    Limited due to neuopathy    Diabetes Complications:  Not discussed today.    Recent health service and resource utilization related to diabetes (hyperglycemia, hypoglycemia, etc.):  None    Vitals:  There were no vitals taken for this visit.  Estimated body mass index is 38.74 kg/m  as calculated from the following:    Height as of 22: 1.575 m (5' 2\").    Weight as of 22: 96.1 kg (211 lb 12.8 oz).   Last 3 BP:   BP Readings from Last 3 Encounters:   22 138/81   04/15/22 (!) 150/82   22 130/76       History   Smoking Status     Former Smoker     Packs/day: 0.00     Quit date: 2016 "   Smokeless Tobacco     Never Used       Labs:  Lab Results   Component Value Date    A1C 8.5 07/14/2022    A1C 8.9 11/12/2021     Lab Results   Component Value Date     04/22/2022     08/24/2020     Lab Results   Component Value Date    LDL 39 10/08/2021    LDL 26 06/18/2020     HDL Cholesterol   Date Value Ref Range Status   06/18/2020 78 >49 mg/dL Final     Direct Measure HDL   Date Value Ref Range Status   10/08/2021 76 >=50 mg/dL Final   ]  GFR Estimate   Date Value Ref Range Status   04/22/2022 81 >60 mL/min/1.73m2 Final     Comment:     Effective December 21, 2021 eGFRcr in adults is calculated using the 2021 CKD-EPI creatinine equation which includes age and gender (Isaac et al., NEJ, DOI: 10.1056/PWSEir6032085)   08/24/2020 >90 >60 mL/min/[1.73_m2] Final     Comment:     Non  GFR Calc  Starting 12/18/2018, serum creatinine based estimated GFR (eGFR) will be   calculated using the Chronic Kidney Disease Epidemiology Collaboration   (CKD-EPI) equation.       GFR Estimate If Black   Date Value Ref Range Status   08/24/2020 >90 >60 mL/min/[1.73_m2] Final     Comment:      GFR Calc  Starting 12/18/2018, serum creatinine based estimated GFR (eGFR) will be   calculated using the Chronic Kidney Disease Epidemiology Collaboration   (CKD-EPI) equation.       Lab Results   Component Value Date    CR 0.80 04/22/2022    CR 0.68 08/24/2020       Health Beliefs and Attitudes:   Patient Activation Measure Survey Score:  RADHA Score (Last Two) 9/15/2011 1/5/2012   RADHA Raw Score 30 35   Activation Score 37.3 45.2   RADHA Level 1 1       Barriers to Learning:  Assessment: Cognitive impairment    INTERVENTION:    Education provided today on:  Taking Medication: stopped Novolog as recommended. Started Glimepiride as recommended.     Opportunities for ongoing education and support in diabetes-self management were discussed.    Pt verbalized understanding of concepts discussed and  recommendations provided today.       Education Materials Provided:  No new materials provided today    PLAN:  No change to Basaglar dose.  Pt asked if Glimepiride dose could be increased. Pt advised CDE will refer her question to her endocrinologist.      FOLLOW-UP:  Has return appt scheduled with Dr Medrano in Jan.     Ongoing plan for education and support: As needed.    Time Spent: 30 minutes  Encounter Type: Individual    Any diabetes medication dose changes were made via the CDE Protocol and Collaborative Practice Agreement with the patient's endocrinology provider. A copy of this encounter was shared with the provider.    Holly Muir comes into clinic today at the request of Dr Medrano, Ordering Provider for Pt Teaching and bg review.     This service provided today was under the supervising provider of the day Dr Nixon, who was available if needed.    Mikayla Sellers, RN, BSN, [facility]   Certified Diabetes Care/  [facility]

## 2022-08-09 DIAGNOSIS — E11.40 TYPE 2 DIABETES MELLITUS WITH DIABETIC NEUROPATHY (H): Primary | ICD-10-CM

## 2022-08-09 RX ORDER — GLIMEPIRIDE 4 MG/1
4 TABLET ORAL
Qty: 90 TABLET | Refills: 1 | Status: SHIPPED | OUTPATIENT
Start: 2022-08-09 | End: 2022-09-27

## 2022-08-15 ENCOUNTER — OFFICE VISIT (OUTPATIENT)
Dept: AUDIOLOGY | Facility: CLINIC | Age: 67
End: 2022-08-15
Payer: COMMERCIAL

## 2022-08-15 DIAGNOSIS — H90.3 SENSORINEURAL HEARING LOSS (SNHL) OF BOTH EARS: Primary | ICD-10-CM

## 2022-08-15 PROCEDURE — V5010 ASSESSMENT FOR HEARING AID: HCPCS | Performed by: AUDIOLOGIST

## 2022-08-15 NOTE — PROGRESS NOTES
AUDIOLOGY REPORT    BACKGROUND INFORMATION: Holly Muir was seen in the Audiology Clinic at [facility] on 8/15/2022 for follow-up.  The patient has been seen previously in this clinic on 3/08/22 and results revealed a mild to moderate sensorineural hearing loss bilaterally.  The patient was fit with Phonak UUSEEeo  hearing aids on 6/01/2022.  The patient reports that she is having to push her earmolds in better throughout the day. .    TEST RESULTS AND PROCEDURES: Options were discussed. It was decided that the patient would benefit from new earmolds. Ear impressions were taken without incident. New c-shell earmolds were ordered with skeleton locks.     SUMMARY AND RECOMMENDATIONS: A hearing aid check was performed today and we will call the patient when her new earmolds are ready.  Call this clinic with questions regarding today s visit.    Cherie Powers.  Doctor of Audiology  [place] License # [id number]

## 2022-08-17 ENCOUNTER — PATIENT OUTREACH (OUTPATIENT)
Dept: GERIATRIC MEDICINE | Facility: CLINIC | Age: 67
End: 2022-08-17

## 2022-08-18 NOTE — PROGRESS NOTES
CC updated program tasks and targets for [employer] launch.  Divya Smalls RN,BSN  [facility] Case Coordinator   [phone]

## 2022-08-22 DIAGNOSIS — M25.562 ARTHRALGIA OF BOTH KNEES: ICD-10-CM

## 2022-08-22 DIAGNOSIS — E88.819 INSULIN RESISTANCE: ICD-10-CM

## 2022-08-22 DIAGNOSIS — R25.1 TREMOR: ICD-10-CM

## 2022-08-22 DIAGNOSIS — G25.81 RESTLESS LEG SYNDROME: ICD-10-CM

## 2022-08-22 DIAGNOSIS — I10 HTN, GOAL BELOW 140/90: ICD-10-CM

## 2022-08-22 DIAGNOSIS — Z79.4 UNCONTROLLED TYPE 2 DIABETES MELLITUS WITH HYPERGLYCEMIA, WITH LONG-TERM CURRENT USE OF INSULIN (H): ICD-10-CM

## 2022-08-22 DIAGNOSIS — M25.561 ARTHRALGIA OF BOTH KNEES: ICD-10-CM

## 2022-08-22 DIAGNOSIS — E11.65 UNCONTROLLED TYPE 2 DIABETES MELLITUS WITH HYPERGLYCEMIA, WITH LONG-TERM CURRENT USE OF INSULIN (H): ICD-10-CM

## 2022-08-23 ENCOUNTER — NURSE TRIAGE (OUTPATIENT)
Dept: FAMILY MEDICINE | Facility: CLINIC | Age: 67
End: 2022-08-23

## 2022-08-23 RX ORDER — RAMIPRIL 5 MG/1
CAPSULE ORAL
Qty: 90 CAPSULE | Refills: 2 | Status: SHIPPED | OUTPATIENT
Start: 2022-08-23 | End: 2023-08-11

## 2022-08-23 RX ORDER — PROPRANOLOL HYDROCHLORIDE 80 MG/1
CAPSULE, EXTENDED RELEASE ORAL
Qty: 90 CAPSULE | Refills: 2 | Status: SHIPPED | OUTPATIENT
Start: 2022-08-23 | End: 2023-07-20

## 2022-08-23 RX ORDER — ROPINIROLE 0.25 MG/1
0.25 TABLET, FILM COATED ORAL EVERY EVENING
Qty: 90 TABLET | Refills: 2 | Status: SHIPPED | OUTPATIENT
Start: 2022-08-23 | End: 2023-08-18

## 2022-08-23 RX ORDER — PSEUDOEPHED/ACETAMINOPH/DIPHEN 30MG-500MG
TABLET ORAL
Qty: 180 TABLET | Refills: 1 | Status: SHIPPED | OUTPATIENT
Start: 2022-08-23 | End: 2022-09-27

## 2022-08-23 NOTE — TELEPHONE ENCOUNTER
"    Reason for Disposition    High-risk adult (e.g., age > 60 years, osteoporosis, chronic steroid use)    Additional Information    Negative: Major bleeding (actively dripping or spurting) that can't be stopped    Negative: Amputation or bone sticking through the skin    Negative: Bullet, stabbed by knife or other serious penetrating wound    Negative: Serious injury with multiple fractures (broken bones)    Negative: Sounds like a life-threatening emergency to the triager    Negative: Wound looks infected    Negative: Looks like a broken bone or dislocated joint (crooked or deformed)    Negative: Can't move injured shoulder at all    Negative: Collar bone is painful or tender to touch    Negative: Skin is split open or gaping (length > 1/2 inch or 12 mm)    Negative: Bleeding won't stop after 10 minutes of direct pressure (using correct technique)    Negative: Dirt in the wound and not removed after 15 minutes of scrubbing    Negative: Sounds like a serious injury to the triager    Negative: SEVERE pain (e.g., excruciating)    Negative: Can't move injured shoulder normally (e.g., full range of motion, able to touch top of head)    Negative: Large swelling or bruise and size > palm of person's hand    Negative: No prior tetanus shots (or is not fully vaccinated) and any wound (e.g., cut or scrape)    Negative: HIV positive or severe immunodeficiency (severely weak immune system) and DIRTY cut or scrape    Negative: Patient wants to be seen    Answer Assessment - Initial Assessment Questions  1. MECHANISM: \"How did the injury happen?\"      Fell on 7/14/22 after she had been seen by provider.    2. ONSET: \"When did the injury happen?\" (Minutes or hours ago)       7/14/22    3. APPEARANCE of INJURY: \"What does the injury look like?\"       No change in appearance at this time    4. SEVERITY: \"Can you move the shoulder normally?\"       Pain with activity causes pt to limit activities and movements    5. SIZE: For cuts, " "bruises, or swelling, ask: \"How large is it?\" (e.g., inches or centimeters;  entire joint)       N/A    6. PAIN: \"Is there pain?\" If Yes, ask: \"How bad is the pain?\"   (e.g., Scale 1-10; or mild, moderate, severe)    - MILD (1-3): doesn't interfere with normal activities    - MODERATE (4-7): interferes with normal activities (e.g., work or school) or awakens from sleep    - SEVERE (8-10): excruciating pain, unable to do any normal activities, unable to move arm at all due to pain      #8/10 with activities    Protocols used: SHOULDER INJURY-A-OH    "

## 2022-08-23 NOTE — TELEPHONE ENCOUNTER
Prescription approved per [facility] Refill Protocol.    Martha Chahal, RN, BSN

## 2022-08-24 ENCOUNTER — TELEPHONE (OUTPATIENT)
Dept: FAMILY MEDICINE | Facility: CLINIC | Age: 67
End: 2022-08-24

## 2022-08-24 NOTE — TELEPHONE ENCOUNTER
Reviewed last OV note regarding ostomy supplies:    Left five of each of the following.  [place] skin barrier wafers #[id number], [place] pouches #[id number], and barrier rings #[id number].      Gathered requested supplies, notified patient of supplies waiting at upper level .     Also notified patient that this would be the last time we could give her supplies.   Patient verbalized understanding.  Patient will call her ostomy supplier and insurance company to make sure she has proper supplies delivered timely.     Priscilla Campo RN on 8/24/2022 at 2:44 PM

## 2022-08-24 NOTE — TELEPHONE ENCOUNTER
Reason for Call:  Other call back    Detailed comments: Patient calling stating that she has ordered colostomy bags and they still have not come yet. She stated that she is on her last bag and needs more, wondering if we can get her some. Please call her back at [phone]    Phone Number Patient can be reached at: Home number on file [phone] (home)    Best Time: any    Can we leave a detailed message on this number? YES    Call taken on 8/24/2022 at 2:07 PM by Sheela Martinez

## 2022-08-25 ENCOUNTER — OFFICE VISIT (OUTPATIENT)
Dept: URGENT CARE | Facility: URGENT CARE | Age: 67
End: 2022-08-25
Payer: COMMERCIAL

## 2022-08-25 VITALS
TEMPERATURE: 98.4 F | WEIGHT: 205 LBS | RESPIRATION RATE: 18 BRPM | SYSTOLIC BLOOD PRESSURE: 130 MMHG | BODY MASS INDEX: 37.49 KG/M2 | HEART RATE: 65 BPM | OXYGEN SATURATION: 100 % | DIASTOLIC BLOOD PRESSURE: 83 MMHG

## 2022-08-25 DIAGNOSIS — M77.8 ENTHESOPATHY OF RIGHT SHOULDER: Primary | ICD-10-CM

## 2022-08-25 DIAGNOSIS — S49.91XA SHOULDER INJURY, RIGHT, INITIAL ENCOUNTER: ICD-10-CM
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
## 2022-08-25 DIAGNOSIS — M79.621 PAIN OF RIGHT UPPER ARM: ICD-10-CM

## 2022-08-25 PROCEDURE — 99214 OFFICE O/P EST MOD 30 MIN: CPT | Performed by: NURSE PRACTITIONER

## 2022-08-25 RX ORDER — PIOGLITAZONEHYDROCHLORIDE 45 MG/1
45 TABLET ORAL DAILY
Qty: 90 TABLET | Refills: 3 | Status: SHIPPED | OUTPATIENT
Start: 2022-08-25 | End: 2023-08-15

## 2022-08-25 NOTE — TELEPHONE ENCOUNTER
METFORMIN HCL 1,000 MG TABLET  Last Written Prescription Date:  7/23/2021  Last Fill Quantity: 180,   # refills: 3  Last Office Visit : 5/13/2022  Future Office visit:   1/4/2023  180 Tabs, 3 Refills sent to pharm     PIOGLITAZONE HCL 45 MG TABLET  Last Written Prescription Date:  7/23/2021  Last Fill Quantity: 90,   # refills: 3  Last Office Visit : 5/13/2022  Future Office visit:   1/4/2023  90 Tabs, 3 Refills sent to pharm     Noy Allan RN  Central Triage Red Flags/Med Refills

## 2022-08-29 ENCOUNTER — HOSPITAL ENCOUNTER (OUTPATIENT)
Dept: PHYSICAL THERAPY | Facility: CLINIC | Age: 67
Setting detail: THERAPIES SERIES
Discharge: HOME | End: 2022-08-29
Attending: OTOLARYNGOLOGY
Payer: COMMERCIAL

## 2022-08-29 PROCEDURE — 97110 THERAPEUTIC EXERCISES: CPT | Mod: GP | Performed by: PHYSICAL THERAPIST

## 2022-08-29 PROCEDURE — 97112 NEUROMUSCULAR REEDUCATION: CPT | Mod: GP | Performed by: PHYSICAL THERAPIST

## 2022-08-30 DIAGNOSIS — J30.2 SEASONAL ALLERGIC RHINITIS, UNSPECIFIED TRIGGER: ICD-10-CM

## 2022-08-31 ENCOUNTER — TELEPHONE (OUTPATIENT)
Dept: FAMILY MEDICINE | Facility: CLINIC | Age: 67
End: 2022-08-31

## 2022-08-31 RX ORDER — CETIRIZINE HYDROCHLORIDE 10 MG/1
TABLET ORAL
Qty: 90 TABLET | Refills: 1 | Status: SHIPPED | OUTPATIENT
Start: 2022-08-31 | End: 2023-01-11

## 2022-08-31 NOTE — TELEPHONE ENCOUNTER
This writer attempted to contact patient on 08/31/22      Reason for call update patient on refill status and left message.      If patient calls back:   Please let patient know Zyrtec was sent to to [facility] today, 8/31/22. Omeprazole, tramadol, and celecoxib were sent to Dr. King today, 8/31/22 for approval.       Liz Hernandez RN

## 2022-09-01 NOTE — TELEPHONE ENCOUNTER
Patient is calling back to the clinic in regards to voicemail message that was left below. Writer relayed that all refill requests have been approved and were sent to the pharmacy to be picked up.     Patient verbalized understanding. No further questions or concerns at this time.     Luh Moore RN, BSN  [facility]

## 2022-09-01 NOTE — TELEPHONE ENCOUNTER
Patient notified of message as written below from the nurse. Patient verbalized good understanding, had no further questions and needed no further support.India Patel R.N.

## 2022-09-02 ENCOUNTER — ANCILLARY PROCEDURE (OUTPATIENT)
Dept: GENERAL RADIOLOGY | Facility: CLINIC | Age: 67
End: 2022-09-02
Attending: FAMILY MEDICINE
Payer: COMMERCIAL

## 2022-09-02 DIAGNOSIS — G89.29 CHRONIC BILATERAL LOW BACK PAIN WITHOUT SCIATICA: ICD-10-CM

## 2022-09-02 DIAGNOSIS — M54.50 CHRONIC BILATERAL LOW BACK PAIN WITHOUT SCIATICA: ICD-10-CM

## 2022-09-02 DIAGNOSIS — M47.896 OTHER SPONDYLOSIS, LUMBAR REGION: ICD-10-CM

## 2022-09-02 PROCEDURE — 64493 INJ PARAVERT F JNT L/S 1 LEV: CPT | Mod: 50 | Performed by: RADIOLOGY

## 2022-09-02 RX ORDER — BUPIVACAINE HYDROCHLORIDE 5 MG/ML
5 INJECTION, SOLUTION EPIDURAL; INTRACAUDAL ONCE
Status: COMPLETED | OUTPATIENT
Start: 2022-09-02 | End: 2022-09-02

## 2022-09-02 RX ORDER — METHYLPREDNISOLONE ACETATE 40 MG/ML
40 INJECTION, SUSPENSION INTRA-ARTICULAR; INTRALESIONAL; INTRAMUSCULAR; SOFT TISSUE ONCE
Status: COMPLETED | OUTPATIENT
Start: 2022-09-02 | End: 2022-09-02

## 2022-09-02 RX ADMIN — BUPIVACAINE HYDROCHLORIDE 10 MG: 5 INJECTION, SOLUTION EPIDURAL; INTRACAUDAL at 09:40

## 2022-09-02 RX ADMIN — METHYLPREDNISOLONE ACETATE 40 MG: 40 INJECTION, SUSPENSION INTRA-ARTICULAR; INTRALESIONAL; INTRAMUSCULAR; SOFT TISSUE at 09:40

## 2022-09-06 ENCOUNTER — OFFICE VISIT (OUTPATIENT)
Dept: AUDIOLOGY | Facility: CLINIC | Age: 67
End: 2022-09-06
Payer: COMMERCIAL

## 2022-09-06 DIAGNOSIS — H90.3 SENSORINEURAL HEARING LOSS (SNHL) OF BOTH EARS: Primary | ICD-10-CM

## 2022-09-06 PROCEDURE — V5010 ASSESSMENT FOR HEARING AID: HCPCS | Performed by: AUDIOLOGIST

## 2022-09-06 NOTE — PROGRESS NOTES
AUDIOLOGY REPORT    BACKGROUND INFORMATION: Holly Muir was seen in the Audiology Clinic at [facility] on 9/6/2022 for follow-up.  The patient has been seen previously in this clinic on 3/08/22 and results revealed a mild to moderate sensorineural hearing loss bilaterally.  The patient was fit with Phonak Audeo P70 hearing aids on 6/01/22.  The patient returns today for fitting of new c shell earmolds in effort to improve retention. .    TEST RESULTS AND PROCEDURES: New earmolds were placed and provided a good fit. An electroacoustic hearing aid check was performed. The hearing aid conformity evaluation was completed. This includes initially evaluating the devices electroacoustically and later matching NAL-NL2 target with soft sounds audible, moderate sounds comfortable and clear, and loud sounds below discomfort.     SUMMARY AND RECOMMENDATIONS: A hearing aid check was performed today and the patient reports good sound and comfort with her new molds.  Adjustments were made as noted above and the patient will return as needed or at least every 4-6 months for cleaning and assessment of hearing aid.  Call this clinic with questions regarding today s visit.    Cherie Powers.  Doctor of Audiology  [place] License # [id number]

## 2022-09-07 ENCOUNTER — VIRTUAL VISIT (OUTPATIENT)
Dept: FAMILY MEDICINE | Facility: CLINIC | Age: 67
End: 2022-09-07
Payer: COMMERCIAL

## 2022-09-07 DIAGNOSIS — I10 HTN, GOAL BELOW 140/90: ICD-10-CM

## 2022-09-07 DIAGNOSIS — F11.20 CONTINUOUS OPIOID DEPENDENCE (H): Primary | ICD-10-CM

## 2022-09-07 DIAGNOSIS — E78.5 HYPERLIPIDEMIA LDL GOAL <100: ICD-10-CM

## 2022-09-07 DIAGNOSIS — G89.4 CHRONIC PAIN SYNDROME: ICD-10-CM

## 2022-09-07 DIAGNOSIS — E11.649 UNCONTROLLED TYPE 2 DIABETES MELLITUS WITH HYPOGLYCEMIA WITHOUT COMA (H): ICD-10-CM

## 2022-09-07 PROCEDURE — 99214 OFFICE O/P EST MOD 30 MIN: CPT | Mod: 95 | Performed by: INTERNAL MEDICINE

## 2022-09-07 NOTE — PROGRESS NOTES
Holly is a 67 year old who is being evaluated via a billable video visit.      How would you like to obtain your AVS? Mail a copy  If the video visit is dropped, the invitation should be resent by: Text to cell phone: [phone]  Will anyone else be joining your video visit? No        Assessment & Plan     Holly was seen today for recheck medication and diabetes.    Diagnoses and all orders for this visit:    Continuous opioid dependence (H)  -     Drug Confirmation Panel Urine with Creat - lab collect; Future    HTN, goal below 140/90  -     CBC with platelets; Future  -     Albumin Random Urine Quantitative with Creat Ratio; Future  -     Comprehensive metabolic panel (BMP + Alb, Alk Phos, ALT, AST, Total. Bili, TP); Future    Hyperlipidemia LDL goal <100  -     Lipid Profile; Future    Chronic pain syndrome  -     Drug Confirmation Panel Urine with Creat - lab collect; Future    Uncontrolled type 2 diabetes mellitus with hypoglycemia without coma (H)  -     Hemoglobin A1c; Future      Continue current pain regimen. Continue to follow with ortho and endo.      Return in about 6 weeks (around 10/19/2022) for wellness visit, Fasting Lab appointment.    Tiffany King MD PhD  [facility]   Holly is a 67 year old, presenting for the following health issues:  Recheck Medication and Diabetes      Chronic pain follow up.  Higher dose of gabapentin is helping her legs.   Celebex may be somewhat helpful.  Tramadol 30 tablets, refill about 6 weeks after the last one.     Back pain 6/10. Hurts when she bends down.  She got two epidural injections 9/2/2022 but they have not kicked in yet.  She has an appointment with shoulder specialist this Friday.     Follows with endocrine on diabetes. Next visit in 1/2023. A1c in July is 8.5.          History of Present Illness       Diabetes:   She presents for follow up of diabetes.  She is checking home blood glucose three times daily. She checks blood  glucose before and after meals.  Blood glucose is sometimes over 200 and never under 70. She is aware of hypoglycemia symptoms including shakiness, dizziness and other. She is concerned about blood sugar frequently over 200 and other. She is having numbness in feet, burning in feet, excessive thirst, blurry vision and weight loss.         She eats 2-3 servings of fruits and vegetables daily.She consumes 1 sweetened beverage(s) daily.She exercises with enough effort to increase her heart rate 20 to 29 minutes per day.  She exercises with enough effort to increase her heart rate 7 days per week. She is missing 1 dose(s) of medications per week.  She is not taking prescribed medications regularly due to remembering to take.         Review of Systems   Constitutional, HEENT, cardiovascular, pulmonary, gi and gu systems are negative, except as otherwise noted.      Objective           Vitals:  No vitals were obtained today due to virtual visit.    Physical Exam   GENERAL: Healthy, alert and no distress          Phone visit duration: 9 minutes

## 2022-09-08 ENCOUNTER — TRANSFERRED RECORDS (OUTPATIENT)
Dept: HEALTH INFORMATION MANAGEMENT | Facility: CLINIC | Age: 67
End: 2022-09-08

## 2022-09-12 NOTE — PROGRESS NOTES
[facility]    Outpatient Physical Therapy Discharge Note  Patient: Holly Muir  : 1955    Beginning/End Dates of Reporting Period:  22 to 22    Referring Provider: Rayne Gaona MD    Therapy Diagnosis: s/s consistent with BPPV, imbalance     Client Self Report: She was trying to walk down some steps with walker in order to walk outside and it went sideways and she fell. She fell and hurt her shoulder.    Goals:  Goal Identifier DHI   Goal Description Holly will lower score on DHI from 78 to 58 or < in order to show decreased dizziness to perform her functional mobility.   Target Date 22   Date Met      Progress (detail required for progress note): 64/100, did not meet her goal, back pain plays a factor in some of the questions on the scale too     Goal Identifier position changes   Goal Description Holly will report no dizzinesss with bed mobility in 3/3 nights in order to more safely and easily perform bed mobility.   Target Date 22   Date Met  22   Progress (detail required for progress note): no longer with dizziness, MET     Goal Identifier new goal-- TUG   Goal Description Holly will improve score on TUG from 21 to <15 secs with least restrictive device in order to show improved transfers and gait to decrease risk of falls.   Target Date 22   Date Met  22   Progress (detail required for progress note): 15.8 secs     Goal Identifier DGI   Goal Description Holly will score an 18 or > on DGI with least restrictice device in order to improve her balance needed for community ambulation with decreased risk of falls.   Target Date 22   Date Met  22   Progress (detail required for progress note): met,      Plan:  Discharge from therapy. Patient seen for 6 PT sessions. Initially was positive for BPPV so this was treate over the first few  sessions and then focused more on balance. She is close to or has met her goals and continues to work on HEP.    Discharge:    Reason for Discharge: Patient has met all goal or partially met all of her goals.    Equipment Issued: none    Discharge Plan: Patient to continue home program.

## 2022-09-14 ENCOUNTER — OFFICE VISIT (OUTPATIENT)
Dept: ORTHOPEDICS | Facility: CLINIC | Age: 67
End: 2022-09-14
Payer: COMMERCIAL

## 2022-09-14 VITALS
HEIGHT: 62 IN | WEIGHT: 205 LBS | SYSTOLIC BLOOD PRESSURE: 130 MMHG | BODY MASS INDEX: 37.73 KG/M2 | DIASTOLIC BLOOD PRESSURE: 80 MMHG

## 2022-09-14 DIAGNOSIS — M79.621 PAIN OF RIGHT UPPER ARM: ICD-10-CM
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
## 2022-09-14 DIAGNOSIS — S49.91XA SHOULDER INJURY, RIGHT, INITIAL ENCOUNTER: ICD-10-CM

## 2022-09-14 PROCEDURE — 99214 OFFICE O/P EST MOD 30 MIN: CPT | Performed by: PEDIATRICS

## 2022-09-14 NOTE — LETTER
9/14/2022         RE: Holly Muir  [address] [address] [address]        Dear Colleague,    Thank you for referring your patient, Holly Muir, to the [facility] [place]. Please see a copy of my visit note below.    ASSESSMENT & PLAN    Holly was seen today for injury.    Diagnoses and all orders for this visit:    Shoulder injury, right, initial encounter  -     Orthopedic  Referral  -     MR Shoulder Right w/o Contrast; Future    Pain of right upper arm  -     Orthopedic  Referral    Fall, initial encounter  -     Orthopedic  Referral  -     MR Shoulder Right w/o Contrast; Future        No clear fracture noted on x-rays, though occult bony injury remains an outside consideration.  We also discussed potential for rotator cuff injury.  We discussed the following: symptom treatment, activity modification/rest, imaging, rehab, injection therapy and support for the affected area. Following discussion, plan:  Imaging next.  MRI order placed, though if her device is not MRI compatible, then we may need to switch to CT arthrogram.  Questions answered. Discussed signs and symptoms that may indicate more serious issues; the patient was instructed to seek appropriate care if noted. Holly indicates understanding of these issues and agrees with the plan.        See Patient Instructions  Patient Instructions   Reviewed ongoing pain from previous fall and the ongoing right shoulder dysfunction.  Discussed additional imaging next, given the trauma preceding this.  MRI scan ordered, though with implant and medical device, this may need to be changed to CT if unable to do an MRI.  Plan to contact you with results of imaging.  If unable to do the MRI, also contact clinic as well.  Depending on MRI results, future considerations could include physical therapy, possibly an injection.      Advanced imaging is done by appointment. Please call [place]  [facility]: [phone] to schedule your MRI.     Depending on your availability you can usually schedule within the next 1-2 days.    Some insurance companies may require a prior authorization to be completed which can delay the time until you are able to schedule your appointment.       If you are active on [employer], you may have access to your test results before your provider is able to review the study and advise on next steps.      The clinic will call you with results. If you have not heard from the clinic within 2-3 days following your MRI, please contact us at the number listed below.      If you have any further questions for your physician or physician s care team you can call [phone] and use option 3 to leave a voice message. Calls received during business hours will be returned same day.        Christopher Sanderson [facility] [place]      CC: Sweta Lilly      -----  Chief Complaint   Patient presents with     Right Shoulder - Injury       SUBJECTIVE  Holly Muir is a/an 67 year old female who is seen in consultation at the request of  Sweta Lilly N.P. for evaluation of right shoulder injury.  Feel in June and landed on her right side.  Pain in her shoulder and will travel down to her elbow.    Presents today in an arm sling  Pain with any use of her arm.  Unable to clean, wash dishes, анна.  Pain with any cold are over area.  .     The patient is seen by themselves.  The patient is Right handed    Onset: 3 month(s) ago. Patient describes injury as fall onto right side   Location of Pain: right shoulder and upper arm   Worsened by: any use of arm   Better with: nothing   Treatments tried: ice, heat and topical analgesics   Associated symptoms: swelling, weakness     Orthopedic/Surgical history: NO  Social History/Occupation: disability     No family history pertinent to patient's problem today.     **  Recalls falling on right  "side.  Had initial swelling and bruising in right upper arm, seems to have improved.  Pain in right shoulder, lateral and posterior, and radiates to elbow level.  No noted joint noise.  Using sling intermittently, but not really that helpful; is annoying.    Has spinal cord stimulator. Thinks this can be turned off for MRI, though most recently two advanced imaging studies included CT of the knee and CT of the lumbar spine, given an implanted device.      REVIEW OF SYSTEMS:  Review of Systems   All other systems reviewed and are negative.        OBJECTIVE:  /80   Ht 1.575 m (5' 2\")   Wt 93 kg (205 lb)   BMI 37.49 kg/m     General: healthy, alert and in no distress  HEENT: no scleral icterus or conjunctival erythema  Skin: no suspicious lesions or rash. No jaundice.  CV: distal perfusion intact   Resp: normal respiratory effort without conversational dyspnea   Psych: normal mood and affect  Gait: ambulates independently  Neuro: Normal light sensory exam of extremity       Right Shoulder exam    ROM:      forward flexion 120-130, pain        abduction 70-80, pain       internal rotation low lumbar, limited compared to left, with pain       external rotation lacking 10-15 deg compared to left, pain at end  Passive > active    Tender:      Lateral shoulder    Strength:      abduction 4/5       internal rotation 4/5       external rotation 3/5    Impingement testing:      positive (+) [place]       positive (+) empty can        RADIOLOGY:  I independently visualized and reviewed these images with the patient  No clear acute bony abnormality noted.        SHOULDER RIGHT THREE OR MORE VIEWS   8/25/2022 3:47 PM      HISTORY:  Right anterior shoulder pain after fall six weeks ago.  Shoulder injury, right, initial encounter.     COMPARISON: None.                                                                      IMPRESSION: Moderate-sized subacromial enthesophyte which would  increase the patient's risk for " impingement. Normal otherwise.     JOSE R IGLESIAS MD           HUMERUS RIGHT TWO OR MORE VIEWS   8/25/2022 3:47 PM      HISTORY: Right upper arm pain after fall six weeks ago. Pain of right  upper arm.     COMPARISON: None.                                                                      IMPRESSION: Subacromial enthesophyte which would increase the  patient's risk for impingement. Small chronic calcifications  anterolateral to the mid shaft of the humerus may be phleboliths or  related to old trauma. Otherwise unremarkable.     JOSE R IGLESIAS MD           Review of prior external note(s) from -   Review of the result(s) of each unique test - imaging  Independent interpretation of a test performed by another physician/other qualified health care professional (not separately reported) - imaging  Ordering of each unique test        Again, thank you for allowing me to participate in the care of your patient.        Sincerely,        Christopher Sanderson,

## 2022-09-14 NOTE — PATIENT INSTRUCTIONS
Reviewed ongoing pain from previous fall and the ongoing right shoulder dysfunction.  Discussed additional imaging next, given the trauma preceding this.  MRI scan ordered, though with implant and medical device, this may need to be changed to CT if unable to do an MRI.  Plan to contact you with results of imaging.  If unable to do the MRI, also contact clinic as well.  Depending on MRI results, future considerations could include physical therapy, possibly an injection.      Advanced imaging is done by appointment. Please call [place] [place] [facility] [place]: [phone] to schedule your MRI.     Depending on your availability you can usually schedule within the next 1-2 days.    Some insurance companies may require a prior authorization to be completed which can delay the time until you are able to schedule your appointment.       If you are active on [employer], you may have access to your test results before your provider is able to review the study and advise on next steps.      The clinic will call you with results. If you have not heard from the clinic within 2-3 days following your MRI, please contact us at the number listed below.      If you have any further questions for your physician or physician s care team you can call [phone] and use option 3 to leave a voice message. Calls received during business hours will be returned same day.

## 2022-09-14 NOTE — PROGRESS NOTES
ASSESSMENT & PLAN    Holly was seen today for injury.    Diagnoses and all orders for this visit:    Shoulder injury, right, initial encounter  -     Orthopedic  Referral  -     MR Shoulder Right w/o Contrast; Future    Pain of right upper arm  -     Orthopedic  Referral    Fall, initial encounter  -     Orthopedic  Referral  -     MR Shoulder Right w/o Contrast; Future        No clear fracture noted on x-rays, though occult bony injury remains an outside consideration.  We also discussed potential for rotator cuff injury.  We discussed the following: symptom treatment, activity modification/rest, imaging, rehab, injection therapy and support for the affected area. Following discussion, plan:  Imaging next.  MRI order placed, though if her device is not MRI compatible, then we may need to switch to CT arthrogram.  Questions answered. Discussed signs and symptoms that may indicate more serious issues; the patient was instructed to seek appropriate care if noted. Holly indicates understanding of these issues and agrees with the plan.        See Patient Instructions  Patient Instructions   Reviewed ongoing pain from previous fall and the ongoing right shoulder dysfunction.  Discussed additional imaging next, given the trauma preceding this.  MRI scan ordered, though with implant and medical device, this may need to be changed to CT if unable to do an MRI.  Plan to contact you with results of imaging.  If unable to do the MRI, also contact clinic as well.  Depending on MRI results, future considerations could include physical therapy, possibly an injection.      Advanced imaging is done by appointment. Please call [place] [place] [facility] [place]: [phone] to schedule your MRI.     Depending on your availability you can usually schedule within the next 1-2 days.    Some insurance companies may require a prior authorization to be completed which can delay the time until you are able to  schedule your appointment.       If you are active on [employer], you may have access to your test results before your provider is able to review the study and advise on next steps.      The clinic will call you with results. If you have not heard from the clinic within 2-3 days following your MRI, please contact us at the number listed below.      If you have any further questions for your physician or physician s care team you can call [phone] and use option 3 to leave a voice message. Calls received during business hours will be returned same day.        Christopher Sanderson [facility] [place]      CC: Sweta Lilly      -----  Chief Complaint   Patient presents with     Right Shoulder - Injury       SUBJECTIVE  Holly Muir is a/an 67 year old female who is seen in consultation at the request of  Sweta Lilly N.P. for evaluation of right shoulder injury.  Feel in June and landed on her right side.  Pain in her shoulder and will travel down to her elbow.    Presents today in an arm sling  Pain with any use of her arm.  Unable to clean, wash dishes, анна.  Pain with any cold are over area.  .     The patient is seen by themselves.  The patient is Right handed    Onset: 3 month(s) ago. Patient describes injury as fall onto right side   Location of Pain: right shoulder and upper arm   Worsened by: any use of arm   Better with: nothing   Treatments tried: ice, heat and topical analgesics   Associated symptoms: swelling, weakness     Orthopedic/Surgical history: NO  Social History/Occupation: disability     No family history pertinent to patient's problem today.     **  Recalls falling on right side.  Had initial swelling and bruising in right upper arm, seems to have improved.  Pain in right shoulder, lateral and posterior, and radiates to elbow level.  No noted joint noise.  Using sling intermittently, but not really that helpful; is annoying.    Has spinal cord  "stimulator. Thinks this can be turned off for MRI, though most recently two advanced imaging studies included CT of the knee and CT of the lumbar spine, given an implanted device.      REVIEW OF SYSTEMS:  Review of Systems   All other systems reviewed and are negative.        OBJECTIVE:  /80   Ht 1.575 m (5' 2\")   Wt 93 kg (205 lb)   BMI 37.49 kg/m     General: healthy, alert and in no distress  HEENT: no scleral icterus or conjunctival erythema  Skin: no suspicious lesions or rash. No jaundice.  CV: distal perfusion intact   Resp: normal respiratory effort without conversational dyspnea   Psych: normal mood and affect  Gait: ambulates independently  Neuro: Normal light sensory exam of extremity       Right Shoulder exam    ROM:      forward flexion 120-130, pain        abduction 70-80, pain       internal rotation low lumbar, limited compared to left, with pain       external rotation lacking 10-15 deg compared to left, pain at end  Passive > active    Tender:      Lateral shoulder    Strength:      abduction 4/5       internal rotation 4/5       external rotation 3/5    Impingement testing:      positive (+) [place]       positive (+) empty can        RADIOLOGY:  I independently visualized and reviewed these images with the patient  No clear acute bony abnormality noted.        SHOULDER RIGHT THREE OR MORE VIEWS   8/25/2022 3:47 PM      HISTORY:  Right anterior shoulder pain after fall six weeks ago.  Shoulder injury, right, initial encounter.     COMPARISON: None.                                                                      IMPRESSION: Moderate-sized subacromial enthesophyte which would  increase the patient's risk for impingement. Normal otherwise.     JOSE R IGLESIAS MD           HUMERUS RIGHT TWO OR MORE VIEWS   8/25/2022 3:47 PM      HISTORY: Right upper arm pain after fall six weeks ago. Pain of right  upper arm.     COMPARISON: None.                                                          "             IMPRESSION: Subacromial enthesophyte which would increase the  patient's risk for impingement. Small chronic calcifications  anterolateral to the mid shaft of the humerus may be phleboliths or  related to old trauma. Otherwise unremarkable.     JOSE R IGLESIAS MD           Review of prior external note(s) from -   Review of the result(s) of each unique test - imaging  Independent interpretation of a test performed by another physician/other qualified health care professional (not separately reported) - imaging  Ordering of each unique test

## 2022-09-16 ENCOUNTER — TELEPHONE (OUTPATIENT)
Dept: ORTHOPEDICS | Facility: CLINIC | Age: 67
End: 2022-09-16
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
## 2022-09-16 DIAGNOSIS — S49.91XA SHOULDER INJURY, RIGHT, INITIAL ENCOUNTER: Primary | ICD-10-CM

## 2022-09-16 NOTE — TELEPHONE ENCOUNTER
CT with contrast ordered, and arthrogram ordered.  Thanks.  Christopher Sanderson DO, CAQ    
Patient LVM regarding a CT order since she is unable to have an MRI.     Please call to notify/discuss # [phone]  
Patient LVM that she was unable to schedule the MRI due to her implant.  They state she can have a CT scan.     Chart review, unsure of what implated devise she has.     CT order pended    Keerthi Segovia MS ATC  
Spoke to patient discussed CT scan ordered and scheduling info provided.    Marino Ervin, ATC        
Detail Level: Simple
Detail Level: Zone
When Should The Patient Follow-Up For Their Next Full-Body Skin Exam?: 3 Months
Quality 137: Melanoma: Continuity Of Care - Recall System: Patient information entered into a recall system that includes: target date for the next exam specified AND a process to follow up with patients regarding missed or unscheduled appointments
Detail Level: Detailed

## 2022-09-19 ENCOUNTER — PATIENT OUTREACH (OUTPATIENT)
Dept: GERIATRIC MEDICINE | Facility: CLINIC | Age: 67
End: 2022-09-19

## 2022-09-19 DIAGNOSIS — G89.4 CHRONIC PAIN SYNDROME: Primary | ICD-10-CM

## 2022-09-19 DIAGNOSIS — E78.00 HIGH CHOLESTEROL: ICD-10-CM

## 2022-09-19 DIAGNOSIS — F11.20 CONTINUOUS OPIOID DEPENDENCE (H): ICD-10-CM

## 2022-09-19 DIAGNOSIS — M17.10 UNILATERAL PRIMARY OSTEOARTHRITIS, UNSPECIFIED KNEE: ICD-10-CM

## 2022-09-19 DIAGNOSIS — M25.561 PAIN IN RIGHT KNEE: ICD-10-CM

## 2022-09-19 NOTE — LETTER
Opioid / Opioid Plus Controlled Substance Agreement    This is an agreement between you and your provider about the safe and appropriate use of controlled substance/opioids prescribed by your care team. Controlled substances are medicines that can cause physical and mental dependence (abuse).    There are strict laws about having and using these medicines. We here at [facility] are committing to working with you in your efforts to get better. To support you in this work, we ll help you schedule regular office appointments for medicine refills. If we must cancel or change your appointment for any reason, we ll make sure you have enough medicine to last until your next appointment.     As a Provider, I will:    Listen carefully to your concerns and treat you with respect.     Recommend a treatment plan that I believe is in your best interest. This plan may involve therapies other than opioid pain medication.     Talk with you often about the possible benefits, and the risk of harm of any medicine that we prescribe for you.     Provide a plan on how to taper (discontinue or go off) using this medicine if the decision is made to stop its use.    As a Patient, I understand that opioid(s):     Are a controlled substance prescribed by my care team to help me function or work and manage my condition(s).     Are strong medicines and can cause serious side effects such as:    Drowsiness, which can seriously affect my driving ability    A lower breathing rate, enough to cause death    Harm to my thinking ability     Depression     Abuse of and addiction to this medicine    Need to be taken exactly as prescribed. Combining opioids with certain medicines or chemicals (such as illegal drugs, sedatives, sleeping pills, and benzodiazepines) can be dangerous or even fatal. If I stop opioids suddenly, I may have severe withdrawal symptoms.    Do not work for all types of pain nor for all patients. If they re not helpful, I may  be asked to stop them.        The risks, benefits and side effects of these medicine(s) were explained to me. I agree that:  1. I will take part in other treatments as advised by my care team. This may be psychiatry or counseling, physical therapy, behavioral therapy, group treatment or a referral to a specialist.     2. I will keep all my appointments. I understand that this is part of the monitoring of opioids. My care team may require an office visit for EVERY opioid/controlled substance refill. If I miss appointments or don t follow instructions, my care team may stop my medicine.    3. I will take my medicines as prescribed. I will not change the dose or schedule unless my care team tells me to. There will be no refills if I run out early.     4. I may be asked to come to the clinic and complete a urine drug test or complete a pill count at any time. If I don t give a urine sample or participate in a pill count, the care team may stop my medicine.    5. I will only receive prescriptions from this clinic for chronic pain. If I am treated by another provider for acute pain issues, I will tell them that I am taking opioid pain medication for chronic pain and that I have a treatment agreement with this provider. I will inform my [facility] care team within one business day if I am given a prescription for any pain medication by another healthcare provider. My [facility] care team can contact other providers and pharmacists about my use of any medicines.    6. It is up to me to make sure that I don t run out of my medicines on weekends or holidays. If my care team is willing to refill my opioid prescription without a visit, I must request refills only during office hours. Refills may take up to 3 business days to process. I will use one pharmacy to fill all my opioid and other controlled substance prescriptions. I will notify the clinic about any changes to my insurance or medication  availability.    7. I am responsible for my prescriptions. If the medicine/prescription is lost, stolen or destroyed, it will not be replaced. I also agree not to share controlled substance medicines with anyone.    8. I am aware I should not use any illegal or recreational drugs. I agree not to drink alcohol unless my care team says I can.       9. If I enroll in the [place] [facility] Cannabis program, I will tell my care team prior to my next refill.     10. I will tell my care team right away if I become pregnant, have a new medical problem treated outside of my regular clinic, or have a change in my medications.    11. I understand that this medicine can affect my thinking, judgment and reaction time. Alcohol and drugs affect the brain and body, which can affect the safety of my driving. Being under the influence of alcohol or drugs can affect my decision-making, behaviors, personal safety, and the safety of others. Driving while impaired (DWI) can occur if a person is driving, operating, or in physical control of a car, motorcycle, boat, snowmobile, ATV, motorbike, off-road vehicle, or any other motor vehicle ([place] Statute 169A.20). I understand the risk if I choose to drive or operate any vehicle or machinery.    I understand that if I do not follow any of the conditions above, my prescriptions or treatment may be stopped or changed.          Opioids  What You Need to Know    What are opioids?   Opioids are pain medicines that must be prescribed by a doctor. They are also known as narcotics.     Examples are:   1. morphine (MS Contin, Maribell)  2. oxycodone (Oxycontin)  3. oxycodone and acetaminophen (Percocet)  4. hydrocodone and acetaminophen (Vicodin, Norco)   5. fentanyl patch (Duragesic)   6. hydromorphone (Dilaudid)   7. methadone  8. codeine (Tylenol #3)     What do opioids do well?   Opioids are best for severe short-term pain such as after a surgery or injury. They may work well for cancer pain. They may  help some people with long-lasting (chronic) pain.     What do opioids NOT do well?   Opioids never get rid of pain entirely, and they don t work well for most patients with chronic pain. Opioids don t reduce swelling, one of the causes of pain.                                    Other ways to manage chronic pain and improve function include:       Treat the health problem that may be causing pain    Anti-inflammation medicines, which reduce swelling and tenderness, such as ibuprofen (Advil, Motrin) or naproxen (Aleve)    Acetaminophen (Tylenol)    Antidepressants and anti-seizure medicines, especially for nerve pain    Topical treatments such as patches or creams    Injections or nerve blocks    Chiropractic or osteopathic treatment    Acupuncture, massage, deep breathing, meditation, visual imagery, aromatherapy    Use heat or ice at the pain site    Physical therapy     Exercise    Stop smoking    Take part in therapy       Risks and side effects     Talk to your doctor before you start or decide to keep taking opioids. Possible side effects include:      Lowering your breathing rate enough to cause death    Overdose, including death, especially if taking higher than prescribed doses    Worse depression symptoms; less pleasure in things you usually enjoy    Feeling tired or sluggish    Slower thoughts or cloudy thinking    Being more sensitive to pain over time; pain is harder to control    Trouble sleeping or restless sleep    Changes in hormone levels (for example, less testosterone)    Changes in sex drive or ability to have sex    Constipation    Unsafe driving    Itching and sweating    Dizziness    Nausea, throwing up and dry mouth    What else should I know about opioids?    Opioids may lead to dependence, tolerance, or addiction.      Dependence means that if you stop or reduce the medicine too quickly, you will have withdrawal symptoms. These include loose poop (diarrhea), jitters, flu-like symptoms,  nervousness and tremors. Dependence is not the same as addiction.                       Tolerance means needing higher doses over time to get the same effect. This may increase the chance of serious side effects.      Addiction is when people improperly use a substance that harms their body, their mind or their relations with others. Use of opiates can cause a relapse of addiction if you have a history of drug or alcohol abuse.      People who have used opioids for a long time may have a lower quality of life, worse depression, higher levels of pain and more visits to doctors.    You can overdose on opioids. Take these steps to lower your risk of overdose:    1. Recognize the signs:  Signs of overdose include decrease or loss of consciousness (blackout), slowed breathing, trouble waking up and blue lips. If someone is worried about overdose, they should call 911.    2. Talk to your doctor about Narcan (naloxone).   If you are at risk for overdose, you may be given a prescription for Narcan. This medicine very quickly reverses the effects of opioids.   If you overdose, a friend or family member can give you Narcan while waiting for the ambulance. They need to know the signs of overdose and how to give Narcan.     3. Don't use alcohol or street drugs.   Taking them with opioids can cause death.    4. Do not take any of these medicines unless your doctor says it s OK. Taking these with opioids can cause death:    Benzodiazepines, such as lorazepam (Ativan), alprazolam (Xanax) or diazepam (Valium)    Muscle relaxers, such as cyclobenzaprine (Flexeril)    Sleeping pills like zolpidem (Ambien)     Other opioids      How to keep you and other people safe while taking opioids:    1. Never share your opioids with others.  Opioid medicines are regulated by the Drug Enforcement Agency (DOUG). Selling or sharing medications is a criminal act.    2. Be sure to store opioids in a secure place, locked up if possible. Young children  can easily swallow them and overdose.    3. When you are traveling with your medicines, keep them in the original bottles. If you use a pill box, be sure you also carry a copy of your medicine list from your clinic or pharmacy.    4. Safe disposal of opioids    Most pharmacies have places to get rid of medicine, called disposal kiosks. Medicine disposal options are also available in every [facility]. Search your county and  medication disposal  to find more options. You can find more details at:  [email or web address]     I agree that my provider, clinic care team, and pharmacy may work with any city, state or federal law enforcement agency that investigates the misuse, sale, or other diversion of my controlled medicine. I will allow my provider to discuss my care with, or share a copy of, this agreement with any other treating provider, pharmacy or emergency room where I receive care.    I have read this agreement and have asked questions about anything I did not understand.    _______________________________________________________  Patient Signature - Holly Muir _____________________                   Date     _______________________________________________________  Provider Signature - Tiffany King MD PhD   _____________________                   Date     _______________________________________________________  Witness Signature (required if provider not present while patient signing)   _____________________                   Date

## 2022-09-19 NOTE — PROGRESS NOTES
[facility] Care Coordination Contact      [facility] Six-Month Telephone Assessment    6 month telephone assessment completed on 9/19/2022.    ER visits: Yes -  [facility]  Hypoglycemia   Hospitalizations: No  TCU stays: No  Significant health status changes: None  Falls/Injuries: No  ADL/IADL changes: No  Changes in services: No    Caregiver Assessment follow up:  n/a    Goals: See POC in chart for goal progress documentation.      Will see member in 6 months for an annual health risk assessment.   Encouraged member to call CC with any questions or concerns in the meantime.     Divya Smalls RN,BSN  [facility] Case Coordinator   [phone]

## 2022-09-20 RX ORDER — TRAMADOL HYDROCHLORIDE 50 MG/1
50 TABLET ORAL 2 TIMES DAILY PRN
Qty: 21 TABLET | Refills: 0 | Status: SHIPPED | OUTPATIENT
Start: 2022-09-20 | End: 2022-10-14

## 2022-09-20 RX ORDER — SIMVASTATIN 20 MG
20 TABLET ORAL AT BEDTIME
Qty: 90 TABLET | Refills: 0 | Status: SHIPPED | OUTPATIENT
Start: 2022-09-20 | End: 2022-12-16

## 2022-09-20 NOTE — TELEPHONE ENCOUNTER
Patient is due for 3 months follow up in early December. Please schedule. Needs to be in person.     Also mail controlled substance agreement to home. Pt needs to sign and mail back.

## 2022-09-21 DIAGNOSIS — E11.40 TYPE 2 DIABETES MELLITUS WITH DIABETIC NEUROPATHY (H): ICD-10-CM

## 2022-09-24 ENCOUNTER — NURSE TRIAGE (OUTPATIENT)
Dept: NURSING | Facility: CLINIC | Age: 67
End: 2022-09-24

## 2022-09-24 DIAGNOSIS — E11.40 TYPE 2 DIABETES MELLITUS WITH DIABETIC NEUROPATHY (H): ICD-10-CM

## 2022-09-24 DIAGNOSIS — M25.561 ARTHRALGIA OF BOTH KNEES: ICD-10-CM

## 2022-09-24 DIAGNOSIS — M25.562 ARTHRALGIA OF BOTH KNEES: ICD-10-CM

## 2022-09-24 NOTE — TELEPHONE ENCOUNTER
"Patient is calling for refill request.    She states she took her last two tablets of glimepiride this morning. She states that she was told to increase her dose to two tablets twice daily \"a month or two\" by LEORA Saldana.    Per review of chart, glimepiride was increased from 2 mg daily to 4 mg daily 8/9/22. CDE visit 7/26/22 noted that patient was on 2 mg daily, and \"Pt asked if Glimepiride dose could be increased. Pt advised CDE will refer her question to her endocrinologist.\"    Patient states that her empty bottle at home is for glimepiride 4 mg tablets and sig says take one tablet by mouth every morning before breakfast. She is not sure when it was last filled, but states the label says it has one refill by 8/9/2023.    Writer called pharmacy, verified they do have one refill remaining for 4 mg tablets with sig to take one tablet daily.    Writer called back patient to communicate this information. She plans to call pharmacy to fill and  today.    Routing encounter High Priority to CDE and Endocrinologist to clarify/reiterate glimepiride dose and plan of care with patient.    Swathi Brown RN  [place] Nurse Advisor  2:21 PM  9/24/2022    Reason for Disposition    [1] Prescription prescribed recently is not at pharmacy AND [2] triager has access to patient's EMR AND [3] prescription is recorded in the EMR    Protocols used: MEDICATION QUESTION CALL-A-AH      "

## 2022-09-26 ENCOUNTER — TELEPHONE (OUTPATIENT)
Dept: EDUCATION SERVICES | Facility: CLINIC | Age: 67
End: 2022-09-26

## 2022-09-26 RX ORDER — GLIMEPIRIDE 4 MG/1
4 TABLET ORAL
Qty: 90 TABLET | Refills: 1 | OUTPATIENT
Start: 2022-09-26

## 2022-09-26 NOTE — TELEPHONE ENCOUNTER
Message sent to Dr Medrano to confirm she is in agreement with increasing Glimepiride dose from 2mg to 4mg daily. Will await her response before Rx sent to pharmacy.     Mikayla Sellers, RN, BSN, [facility] [place]   Certified Diabetes Care/  [facility]

## 2022-09-26 NOTE — TELEPHONE ENCOUNTER
Question referred to Dr Medrano -- okay for pt to increase Glimepiride from 2mg to 4mg daily? Awaiting Dr Medrano's response.    Mikayla Sellers, RN, BSN, [facility]   Certified Diabetes Care/  [facility]

## 2022-09-26 NOTE — TELEPHONE ENCOUNTER
[facility] Call Center    Phone Message    May a detailed message be left on voicemail: yes     Reason for Call: Medication Question or concern regarding medication     Pt is requesting a call back from Mikayla Sellers with some questions about a prescription.  Thanks.

## 2022-09-27 RX ORDER — GLIMEPIRIDE 4 MG/1
4 TABLET ORAL
Qty: 90 TABLET | Refills: 1 | Status: SHIPPED | OUTPATIENT
Start: 2022-09-27 | End: 2023-01-04 | Stop reason: DRUGHIGH

## 2022-09-27 RX ORDER — GLYCERIN/MIN OIL/POLYCARBOPHIL
GEL WITH APPLICATOR (GRAM) VAGINAL
Qty: 180 TABLET | Refills: 1 | Status: SHIPPED | OUTPATIENT
Start: 2022-09-27 | End: 2022-12-06

## 2022-10-13 ENCOUNTER — TELEPHONE (OUTPATIENT)
Dept: FAMILY MEDICINE | Facility: CLINIC | Age: 67
End: 2022-10-13

## 2022-10-13 DIAGNOSIS — E16.0 HYPOGLYCEMIA DUE TO INSULIN: ICD-10-CM

## 2022-10-13 DIAGNOSIS — M15.0 PRIMARY GENERALIZED (OSTEO)ARTHRITIS: ICD-10-CM

## 2022-10-13 DIAGNOSIS — F41.9 ANXIETY: ICD-10-CM

## 2022-10-13 DIAGNOSIS — E87.1 HYPONATREMIA: ICD-10-CM

## 2022-10-13 DIAGNOSIS — R44.1 VISUAL HALLUCINATION: ICD-10-CM

## 2022-10-13 DIAGNOSIS — M25.561 PAIN IN RIGHT KNEE: ICD-10-CM

## 2022-10-13 DIAGNOSIS — M25.549 ARTHRALGIA OF HAND, UNSPECIFIED LATERALITY: ICD-10-CM

## 2022-10-13 DIAGNOSIS — E11.65 TYPE 2 DIABETES MELLITUS WITH HYPERGLYCEMIA, WITH LONG-TERM CURRENT USE OF INSULIN (H): ICD-10-CM

## 2022-10-13 DIAGNOSIS — E11.42 DIABETIC POLYNEUROPATHY ASSOCIATED WITH TYPE 2 DIABETES MELLITUS (H): ICD-10-CM

## 2022-10-13 DIAGNOSIS — T38.3X5A HYPOGLYCEMIA DUE TO INSULIN: ICD-10-CM

## 2022-10-13 DIAGNOSIS — D50.9 IRON DEFICIENCY ANEMIA, UNSPECIFIED IRON DEFICIENCY ANEMIA TYPE: ICD-10-CM

## 2022-10-13 DIAGNOSIS — G89.29 CHRONIC BILATERAL LOW BACK PAIN WITHOUT SCIATICA: ICD-10-CM

## 2022-10-13 DIAGNOSIS — R25.1 TREMOR: ICD-10-CM

## 2022-10-13 DIAGNOSIS — Z79.4 ENCOUNTER FOR LONG-TERM (CURRENT) USE OF INSULIN (H): ICD-10-CM

## 2022-10-13 DIAGNOSIS — N18.31 ANEMIA DUE TO STAGE 3A CHRONIC KIDNEY DISEASE (H): ICD-10-CM

## 2022-10-13 DIAGNOSIS — Z79.4 TYPE 2 DIABETES MELLITUS WITH HYPERGLYCEMIA, WITH LONG-TERM CURRENT USE OF INSULIN (H): ICD-10-CM

## 2022-10-13 DIAGNOSIS — I10 HTN, GOAL BELOW 140/90: ICD-10-CM

## 2022-10-13 DIAGNOSIS — M17.10 UNILATERAL PRIMARY OSTEOARTHRITIS, UNSPECIFIED KNEE: ICD-10-CM

## 2022-10-13 DIAGNOSIS — M54.50 CHRONIC BILATERAL LOW BACK PAIN WITHOUT SCIATICA: ICD-10-CM

## 2022-10-13 DIAGNOSIS — Z93.3 S/P COLOSTOMY (H): ICD-10-CM

## 2022-10-13 DIAGNOSIS — G25.81 RESTLESS LEG SYNDROME: ICD-10-CM

## 2022-10-13 DIAGNOSIS — E66.01 MORBID OBESITY (H): ICD-10-CM

## 2022-10-13 DIAGNOSIS — K62.9 FECAL INCONTINENCE DUE TO ANORECTAL DISORDER: ICD-10-CM

## 2022-10-13 DIAGNOSIS — N18.31 CHRONIC KIDNEY DISEASE, STAGE 3A (H): ICD-10-CM

## 2022-10-13 DIAGNOSIS — F32.3 SEVERE MAJOR DEPRESSION WITH PSYCHOTIC FEATURES (H): ICD-10-CM

## 2022-10-13 DIAGNOSIS — Z85.41 HISTORY OF CERVICAL CANCER: ICD-10-CM

## 2022-10-13 DIAGNOSIS — H25.13 AGE-RELATED NUCLEAR CATARACT OF BOTH EYES: ICD-10-CM

## 2022-10-13 DIAGNOSIS — R15.9 FECAL INCONTINENCE DUE TO ANORECTAL DISORDER: ICD-10-CM

## 2022-10-13 DIAGNOSIS — R41.3 MEMORY LOSS: ICD-10-CM

## 2022-10-13 DIAGNOSIS — M46.96 UNSPECIFIED INFLAMMATORY SPONDYLOPATHY, LUMBAR REGION (H): ICD-10-CM

## 2022-10-13 DIAGNOSIS — G89.4 CHRONIC PAIN SYNDROME: ICD-10-CM

## 2022-10-13 DIAGNOSIS — R33.9 URINARY RETENTION: ICD-10-CM

## 2022-10-13 DIAGNOSIS — F42.9 OBSESSIVE-COMPULSIVE DISORDER, UNSPECIFIED TYPE: ICD-10-CM

## 2022-10-13 DIAGNOSIS — N39.46 MIXED INCONTINENCE URGE AND STRESS: ICD-10-CM

## 2022-10-13 DIAGNOSIS — K21.9 GASTROESOPHAGEAL REFLUX DISEASE WITHOUT ESOPHAGITIS: ICD-10-CM

## 2022-10-13 DIAGNOSIS — Z43.3 COLOSTOMY, EVALUATE (H): ICD-10-CM

## 2022-10-13 DIAGNOSIS — E78.5 HYPERLIPIDEMIA LDL GOAL <100: ICD-10-CM

## 2022-10-13 DIAGNOSIS — F25.9 SCHIZOAFFECTIVE DISORDER, UNSPECIFIED TYPE (H): Primary | ICD-10-CM

## 2022-10-13 DIAGNOSIS — M75.40 ROTATOR CUFF IMPINGEMENT SYNDROME, UNSPECIFIED LATERALITY: ICD-10-CM

## 2022-10-13 DIAGNOSIS — D63.1 ANEMIA DUE TO STAGE 3A CHRONIC KIDNEY DISEASE (H): ICD-10-CM

## 2022-10-13 NOTE — TELEPHONE ENCOUNTER
Forms    Type of form/letter:  Form received from  Asher Sky, placed on Dr. King's desk for signature    Have you been seen for this request:    Do we have the form/letter:     When is form/letter needed by:     How would you like the form/letter returned:     Patient Notified form requests are processed in 3-5 business days:    Okay to leave a detailed message?:

## 2022-10-14 RX ORDER — ASCORBIC ACID 500 MG
TABLET ORAL
Qty: 45 TABLET | Refills: 3 | Status: SHIPPED | OUTPATIENT
Start: 2022-10-14 | End: 2023-09-22

## 2022-10-14 RX ORDER — TRAMADOL HYDROCHLORIDE 50 MG/1
50 TABLET ORAL 2 TIMES DAILY PRN
Qty: 30 TABLET | Refills: 0 | Status: SHIPPED | OUTPATIENT
Start: 2022-10-14 | End: 2022-11-18

## 2022-10-14 RX ORDER — FERROUS GLUCONATE 324(38)MG
324 TABLET ORAL
Qty: 90 TABLET | Refills: 3 | Status: SHIPPED | OUTPATIENT
Start: 2022-10-14 | End: 2023-09-22

## 2022-10-24 NOTE — TELEPHONE ENCOUNTER
Forms    Type of form/letter:   Completed form received from Asher Sky faxed to [phone] and sent to [employer] for scanning into patients chart    Have you been seen for this request:    Do we have the form/letter:     When is form/letter needed by:     How would you like the form/letter returned:     Patient Notified form requests are processed in 3-5 business days:    Okay to leave a detailed message?:

## 2022-11-03 ENCOUNTER — OFFICE VISIT (OUTPATIENT)
Dept: ORTHOPEDICS | Facility: CLINIC | Age: 67
End: 2022-11-03
Payer: COMMERCIAL

## 2022-11-03 DIAGNOSIS — M54.16 LUMBAR RADICULOPATHY: Primary | ICD-10-CM

## 2022-11-03 PROCEDURE — 99214 OFFICE O/P EST MOD 30 MIN: CPT | Performed by: FAMILY MEDICINE

## 2022-11-03 ASSESSMENT — PAIN SCALES - GENERAL: PAINLEVEL: NO PAIN (0)

## 2022-11-03 NOTE — PROGRESS NOTES
HISTORY OF PRESENT ILLNESS  Ms. Muir is a pleasant 67 year old female following up with back pain.  Holly did get relief with the L4 5 facet injection directly at the site of her pain.  Unfortunately she continues to have pain at the inferior portion of her lumbar spine, this radiates down the back of her right buttock, this does not go down her leg.  This is worse in certain positions, worse the more she is on her feet.     PHYSICAL EXAM  General  - normal appearance, in no obvious distress    Musculoskeletal - lumbar spine  - stance: slow to rise and sit  - inspection: normal bone and joint alignment, no obvious scoliosis  - palpation: bilateral lumbar paravertebral spasm  - ROM: pain with bilateral rotation, flexion past 30 deg, extension  - strength: lower extremities 5/5 in all planes  - special tests:  (-) slump test bilaterally  Neuro  - patellar and Achilles DTRs 2+ bilaterally, no sensory or motor deficit, grossly normal coordination, normal muscle tone        ASSESSMENT & PLAN  Ms. Muir is a 67 year old female following up with back pain.    Holly and I discussed her pain from a global standpoint.  Given her pain that does seem radicular this point I think it is reasonable to refer her for an L5-S1 injection, hopefully this can help with what seems to be her proximal radicular pain.    Mera is going to get in touch with me if this injection is ineffective.  At this is the case I would likely refer her to our partners and physical medicine and rehab.    It was a pleasure seeing Holly.        Santy Harrington DO, CAM      This note was constructed using Dragon dictation software, please excuse any minor errors in spelling, grammar, or syntax.

## 2022-11-03 NOTE — NURSING NOTE
Chief Complaint   Patient presents with     Lower Back - Pain       There were no vitals filed for this visit.    There is no height or weight on file to calculate BMI.      LYNDSAY Maya NREMT

## 2022-11-03 NOTE — LETTER
11/3/2022         RE: Holly Muir  [id number] [address] [id number]        Dear Colleague,    Thank you for referring your patient, Holly Muir, to the [facility]. Please see a copy of my visit note below.    HISTORY OF PRESENT ILLNESS  Ms. Muir is a pleasant 67 year old female following up with back pain.  Holly did get relief with the L4 5 facet injection directly at the site of her pain.  Unfortunately she continues to have pain at the inferior portion of her lumbar spine, this radiates down the back of her right buttock, this does not go down her leg.  This is worse in certain positions, worse the more she is on her feet.     PHYSICAL EXAM  General  - normal appearance, in no obvious distress    Musculoskeletal - lumbar spine  - stance: slow to rise and sit  - inspection: normal bone and joint alignment, no obvious scoliosis  - palpation: bilateral lumbar paravertebral spasm  - ROM: pain with bilateral rotation, flexion past 30 deg, extension  - strength: lower extremities 5/5 in all planes  - special tests:  (-) slump test bilaterally  Neuro  - patellar and Achilles DTRs 2+ bilaterally, no sensory or motor deficit, grossly normal coordination, normal muscle tone        ASSESSMENT & PLAN  Ms. Muir is a 67 year old female following up with back pain.    Holly and I discussed her pain from a global standpoint.  Given her pain that does seem radicular this point I think it is reasonable to refer her for an L5-S1 injection, hopefully this can help with what seems to be her proximal radicular pain.    Mera is going to get in touch with me if this injection is ineffective.  At this is the case I would likely refer her to our partners and physical medicine and rehab.    It was a pleasure seeing Holly.        Santy Harrington, , CAQSM      This note was constructed using Dragon dictation software, please excuse any minor errors in spelling,  grammar, or syntax.        Again, thank you for allowing me to participate in the care of your patient.        Sincerely,        Santy Harrington, DO

## 2022-11-04 ENCOUNTER — TELEPHONE (OUTPATIENT)
Dept: PALLIATIVE MEDICINE | Facility: CLINIC | Age: 67
End: 2022-11-04

## 2022-11-04 NOTE — TELEPHONE ENCOUNTER
Pt scheduled for rosa elena   Date: 11-18-22  Time: 100p  Dr. Booth    Instructed pt to have

## 2022-11-07 ENCOUNTER — TRANSFERRED RECORDS (OUTPATIENT)
Dept: HEALTH INFORMATION MANAGEMENT | Facility: CLINIC | Age: 67
End: 2022-11-07

## 2022-11-07 NOTE — TELEPHONE ENCOUNTER
"Writer spoke with pt. Pt was reminded new dosage of Glimepiride is \"one\" 4mg tablet daily with breakfast. Pt states her daughter has been giving her two tablets. Denies hypoglycemia. Pt advised to reduce to 1 tablet daily with breakfast. Pt states bg levels have been running high for about a week but admits to missing several Basaglar injections. Pt advised writer will call her in one week to review bg. Encouraged to to make sure she takes Basaglar daily as prescribed.      Mikayla Sellers, RN, BSN, [facility]   Certified Diabetes Care/  [facility]  "

## 2022-11-07 NOTE — TELEPHONE ENCOUNTER
The patient is calling again regarding questions below. She says she has not heard back about how much she should be taking. Please contact patient ASAP. Thank you.

## 2022-11-09 ENCOUNTER — NURSE TRIAGE (OUTPATIENT)
Dept: FAMILY MEDICINE | Facility: CLINIC | Age: 67
End: 2022-11-09

## 2022-11-09 NOTE — TELEPHONE ENCOUNTER
Provider Response to 2nd Level Triage Request    I have reviewed the RN documentation. My recommendation is:  In person or virtual visit with PCP this Friday at the available same-day slot, agree with other recommendations for RN regarding reasons to go to the ER

## 2022-11-09 NOTE — TELEPHONE ENCOUNTER
"Nurse Triage SBAR    Is this a 2nd Level Triage? YES, LICENSED PRACTITIONER REVIEW IS REQUIRED    Situation:   Patient has had elevated blood glucose readings for the last two months. There has been confusion about medications as well and daughter, Cata (CTC on file) calling requesting clarification on medications and advisement on next steps.    Background:   Patient has historically struggled with managing her medications by herself. Her daughter, Cata, has started helping her manage her medications but states there are duplicates of some medications and other medications have different directions listed than how patient has been taking them. Cata states she has been trying to get her medications organized and back on track but is unsure where to start. Patient's PCP has been out of office.       Cata states patient's blood glucose has ranged anywhere from low 300's to as high as 530. Several months ago patient had been on better control with BG readings ranging around upper 100's to low 200's.     Daughter states patient is not taking novolog, it is still listed as an active medication but daughter states patient was told to stop taking it because she was misusing it. Patient had been dosing more to cover the \"sweets and desserts\" she wanted to have and was causing her to become hypoglycemic and had need up in the ED several time.    Patient is taking glimepiride once per day as prescribed, up until one week ago she had been taking it twice per day. She is unsure who told her to be taking it twice per day but believes it was a doctor. RN did not locate a provider note stating to take it twice per day.    She takes Basaglar as prescribed, takes it the evening around 9:00 pm each day.     Metformin is taken twice per day as prescribed. Had trialed taking it three times per day, she is unsure who told her to trial taking it three times per day. She resumed taking it twice per day about one week ago. "       Assessment:   Patient uses a continuous BG monitoring system. Below are her BG levels from the last three days.  All pre-meal or shortly after eating.    11/7/22 10:30 am 430, 4:04 pm 422, 7:09pm 314, 8:29 pm 379, 9:35 pm 348    11/8/22 12:05 am 331, 9:15 am 331, 10:40am 401, 1:34 pm 512, 4:17 pm 473 6:06 pm 421     11/9/2022 1:14 am 341, 8:28 am 359, 11:30 am 409, 1:40pm 351    Patient is alert and oriented X4, denies vomiting, fever, sweating/chills, tachypnea, SOB, weakness.    Patient endorses chronic dizziness and states she urinates frequently because she drinks a lot of water. Daughter confirmed she pushes fluids on her most of the day.     Has not been able to check ketones at home.     Protocol Recommended Disposition:   Discuss With PCP And Callback By Nurse Within 1 Hour    Recommendation:   RN advised if in the meantime BG is higher than 500 and/or she is symptomatic with BG greater than 240 she needs to be evaluated in ED. Cata verbalized good understanding.      Routed to provider    Does the patient meet one of the following criteria for ADS visit consideration? 16+ years old, with an [facility] PCP     TIP  Providers, please consider if this condition is appropriate for management at one of our Acute and Diagnostic Services sites.     If patient is a good candidate, please use dotphrase <dot>triageresponse and select Refer to ADS to document.                Reason for Disposition    Blood glucose > 300 mg/dL (16.7 mmol/L) AND two or more times in a row    Additional Information    Negative: Unconscious or difficult to awaken    Negative: Acting confused (e.g., disoriented, slurred speech)    Negative: Very weak (can't stand)    Negative: Sounds like a life-threatening emergency to the triager    Negative: Vomiting and signs of dehydration (e.g., very dry mouth, lightheaded, dark urine)    Negative: Blood glucose > 240 mg/dL (13.3 mmol/L) and rapid breathing    Negative: Blood glucose > 500 mg/dL  "(27.8 mmol/L)    Negative: Blood glucose > 240 mg/dL (13.3 mmol/L) AND urine ketones moderate-large (or more than 1+)    Negative: Blood glucose > 240 mg/dL (13.3 mmol/L) and blood ketones > 1.4 mmol/L    Negative: Blood glucose > 240 mg/dL (13.3 mmol/L) AND vomiting AND unable to check for ketones (in blood or urine)    Negative: Vomiting lasting > 4 hours    Negative: Patient sounds very sick or weak to the triager    Negative: Fever > 100.4 F (38.0 C)    Answer Assessment - Initial Assessment Questions  1. BLOOD GLUCOSE: \"What is your blood glucose level?\"       351 checked at 1:40 pm today. Checking blood sugar before every meal. Approximately 4 times per day.   2. ONSET: \"When did you check the blood glucose?\"      1:40 pm today  3. USUAL RANGE: \"What is your glucose level usually?\" (e.g., usual fasting morning value, usual evening value)      Normal varies, before medication adjustment low upper 100's to low 200's.   But has been elevated even since 10/18. Historically when daughter is watching her BG ranging around 160    4. KETONES: \"Do you check for ketones (urine or blood test strips)?\" If yes, ask: \"What does the test show now?\"       No    5. TYPE 1 or 2:  \"Do you know what type of diabetes you have?\"  (e.g., Type 1, Type 2, Gestational; doesn't know)       Type 2    6. INSULIN: \"Do you take insulin?\" \"What type of insulin(s) do you use? What is the mode of delivery? (syringe, pen; injection or pump)?\"       Used to take novolog but was recently changed.     7. DIABETES PILLS: \"Do you take any pills for your diabetes?\" If yes, ask: \"Have you missed taking any pills recently?\"      Metformin twice per day    8. OTHER SYMPTOMS: \"Do you have any symptoms?\" (e.g., fever, frequent urination, difficulty breathing, dizziness, weakness, vomiting)      Frequent urination but that is normal for her because she drinks a lot of water. Dizziness but that is chronic.    Protocols used: DIABETES - HIGH BLOOD " SUGAR-A-OH

## 2022-11-09 NOTE — TELEPHONE ENCOUNTER
RN called patient's daughter back and scheduled appointment for 11/11/2022. Was unable to schedule with PCP as patient has another appointment for injections in her knees and shoulder that same time. And next available appointment with PCP is not until December, 2022. Schedule with another provider to have patient evaluated sooner than December.     Fozia Scott RN    [facility]

## 2022-11-10 ENCOUNTER — OFFICE VISIT (OUTPATIENT)
Dept: ORTHOPEDICS | Facility: CLINIC | Age: 67
End: 2022-11-10
Payer: COMMERCIAL

## 2022-11-10 DIAGNOSIS — M17.11 PRIMARY OSTEOARTHRITIS OF RIGHT KNEE: ICD-10-CM

## 2022-11-10 DIAGNOSIS — M17.12 PRIMARY OSTEOARTHRITIS OF LEFT KNEE: Primary | ICD-10-CM

## 2022-11-10 PROCEDURE — 20610 DRAIN/INJ JOINT/BURSA W/O US: CPT | Mod: 50 | Performed by: FAMILY MEDICINE

## 2022-11-10 PROCEDURE — 99207 PR DROP WITH A PROCEDURE: CPT | Performed by: FAMILY MEDICINE

## 2022-11-10 RX ORDER — TRIAMCINOLONE ACETONIDE 40 MG/ML
40 INJECTION, SUSPENSION INTRA-ARTICULAR; INTRAMUSCULAR
Status: DISCONTINUED | OUTPATIENT
Start: 2022-11-10 | End: 2023-07-05

## 2022-11-10 RX ADMIN — TRIAMCINOLONE ACETONIDE 40 MG: 40 INJECTION, SUSPENSION INTRA-ARTICULAR; INTRAMUSCULAR at 14:01

## 2022-11-10 ASSESSMENT — PAIN SCALES - GENERAL: PAINLEVEL: MODERATE PAIN (5)

## 2022-11-10 NOTE — LETTER
11/10/2022         RE: Holly Muir  [address] [address] [facility] [id number]        Dear Colleague,    Thank you for referring your patient, Holly Muir, to the [facility] [place]. Please see a copy of my visit note below.    Holly has chronic bilateral knee pain secondary to osteoarthritis, she is here for knee injections.        Large Joint Injection/Arthocentesis: bilateral knee    Date/Time: 11/10/2022 2:01 PM  Performed by: Santy Harrington DO  Authorized by: Santy Harrington DO     Indications:  Pain  Needle Size:  22 G  Guidance: landmark guided    Approach:  Lateral  Location:  Knee  Laterality:  Bilateral      Medications (Right):  40 mg triamcinolone 40 MG/ML  Medications (Left):  40 mg triamcinolone 40 MG/ML  Outcome:  Tolerated well, no immediate complications  Procedure discussed: discussed risks, benefits, and alternatives    Consent Given by:  Patient  Timeout: timeout called immediately prior to procedure    Prep: patient was prepped and draped in usual sterile fashion         PROCEDURE    Knee Injections - Intraarticular    The patient was informed of the risks and the benefits of the procedure and a written consent was signed.    The patient s left knee was prepped with chlorhexidine in sterile fashion.   40 mg of triamcinolone suspension was drawn up into a 5 mL syringe with 4 mL of 1% lidocaine.  Injection was performed using substerile technique.  A 1.5-inch 22-gauge needle was used to enter the lateral aspect of the left knee.  Injection performed successfully without difficulty.  There were no complications. The patient tolerated the procedure well. There was negligible bleeding.     The patient's right knee was prepped with chlorhexidine in sterile fashion.   40 mg of triamcinolone suspension was drawn up into a 5 mL syringe with 4 mL of 1% lidocaine.  Injection was performed using substerile technique.  A 1.5-inch 22-gauge needle was  used to enter the lateral aspect of the right knee.  Injection performed successfully without difficulty.  There were no complications. The patient tolerated the procedure well. There was negligible bleeding.                   Again, thank you for allowing me to participate in the care of your patient.        Sincerely,        Santy Harrington, DO

## 2022-11-10 NOTE — NURSING NOTE
[facility]   ORTHOPEDICS & SPORTS MEDICINE  [address] [place] [address]  Dept: [phone]  ______________________________________________________________________________    Patient: Holly Muir   : 1955   MRN: [id number]   November 10, 2022    INVASIVE PROCEDURE SAFETY CHECKLIST    Date: 11/10/2022    Procedure: Bilateral knee injections   Patient Name: Holly Muir  MRN: [id number]  YOB: 1955    Action: Complete sections as appropriate. Any discrepancy results in a HARD COPY until resolved.     PRE PROCEDURE:  Patient ID verified with 2 identifiers (name and  or MRN): Yes  Procedure and site verified with patient/designee (when able): Yes  Accurate consent documentation in medical record: Yes  H&P (or appropriate assessment) documented in medical record: Yes  H&P must be up to 20 days prior to procedure and updates within 24 hours of procedure as applicable: NA  Relevant diagnostic and radiology test results appropriately labeled and displayed as applicable: NA  Procedure site(s) marked with provider initials: NA    TIMEOUT:  Time-Out performed immediately prior to starting procedure, including verbal and active participation of all team members addressing the following:Yes  * Correct patient identify  * Confirmed that the correct side and site are marked  * An accurate procedure consent form  * Agreement on the procedure to be done  * Correct patient position  * Relevant images and results are properly labeled and appropriately displayed  * The need to administer antibiotics or fluids for irrigation purposes during the procedure as applicable   * Safety precautions based on patient history or medication use    DURING PROCEDURE: Verification of correct person, site, and procedures any time the responsibility for care of the patient is transferred to another member of the care team.       Prior to injection, verified patient identity using patient's name and  date of birth.  Due to injection administration, patient instructed to remain in clinic for 15 minutes  afterwards, and to report any adverse reaction to me immediately.    Joint injection was performed.      Drug Amount Wasted:  None.  Vial/Syringe: Single dose vial  Expiration Date:  8/30/2024      Francesco Toledo, EMT  November 10, 2022

## 2022-11-10 NOTE — NURSING NOTE
Chief Complaint   Patient presents with     Left Knee - Pain     Right Knee - Pain       There were no vitals filed for this visit.    There is no height or weight on file to calculate BMI.      LYNDSAY Maya NREMT

## 2022-11-11 ENCOUNTER — ANCILLARY PROCEDURE (OUTPATIENT)
Dept: CT IMAGING | Facility: CLINIC | Age: 67
End: 2022-11-11
Attending: PEDIATRICS
Payer: COMMERCIAL

## 2022-11-11 ENCOUNTER — MYC MEDICAL ADVICE (OUTPATIENT)
Dept: FAMILY MEDICINE | Facility: CLINIC | Age: 67
End: 2022-11-11

## 2022-11-11 ENCOUNTER — TELEPHONE (OUTPATIENT)
Dept: FAMILY MEDICINE | Facility: CLINIC | Age: 67
End: 2022-11-11

## 2022-11-11 ENCOUNTER — ANCILLARY PROCEDURE (OUTPATIENT)
Dept: GENERAL RADIOLOGY | Facility: CLINIC | Age: 67
End: 2022-11-11
Attending: PEDIATRICS
Payer: COMMERCIAL

## 2022-11-11 ENCOUNTER — TELEPHONE (OUTPATIENT)
Dept: ORTHOPEDICS | Facility: CLINIC | Age: 67
End: 2022-11-11

## 2022-11-11 DIAGNOSIS — S49.91XA SHOULDER INJURY, RIGHT, INITIAL ENCOUNTER: ICD-10-CM
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
## 2022-11-11 PROCEDURE — 77002 NEEDLE LOCALIZATION BY XRAY: CPT | Mod: XE | Performed by: RADIOLOGY

## 2022-11-11 PROCEDURE — 23350 INJECTION FOR SHOULDER X-RAY: CPT | Mod: RT | Performed by: RADIOLOGY

## 2022-11-11 PROCEDURE — 73201 CT UPPER EXTREMITY W/DYE: CPT | Mod: RT | Performed by: RADIOLOGY

## 2022-11-11 RX ORDER — GADOBUTROL 604.72 MG/ML
7.5 INJECTION INTRAVENOUS ONCE
Status: DISCONTINUED | OUTPATIENT
Start: 2022-11-11 | End: 2022-11-11 | Stop reason: CLARIF

## 2022-11-11 RX ORDER — LIDOCAINE HYDROCHLORIDE 10 MG/ML
30 INJECTION, SOLUTION EPIDURAL; INFILTRATION; INTRACAUDAL; PERINEURAL ONCE
Status: COMPLETED | OUTPATIENT
Start: 2022-11-11 | End: 2022-11-11

## 2022-11-11 RX ORDER — IOPAMIDOL 408 MG/ML
10 INJECTION, SOLUTION INTRATHECAL ONCE
Status: COMPLETED | OUTPATIENT
Start: 2022-11-11 | End: 2022-11-11

## 2022-11-11 RX ADMIN — LIDOCAINE HYDROCHLORIDE 30 ML: 10 INJECTION, SOLUTION EPIDURAL; INFILTRATION; INTRACAUDAL; PERINEURAL at 09:57

## 2022-11-11 RX ADMIN — IOPAMIDOL 10 ML: 408 INJECTION, SOLUTION INTRATHECAL at 09:57

## 2022-11-11 NOTE — TELEPHONE ENCOUNTER
Pt calling in regards to phone call with Trev called back to speak with provider if not available pt would like a message through My chart.       Moraima ROCA Visit Facilitator

## 2022-11-11 NOTE — TELEPHONE ENCOUNTER
Results for orders placed or performed in visit on 11/11/22   CT Shoulder Right w Contrast    Narrative    Exam: CT SHOULDER RIGHT W CONTRAST 11/11/2022 10:32 AM    History: shoulder injury, unable to have MRI due to implanted device;  Shoulder injury, right, initial encounter; Fall, initial encounter    Techniques: Multislice CT examination was performed of the right  shoulder with multiplanar reconstructions displayed in multiple window  settings. Imaging was performed after the administration of  intra-articular contrast material.     DLP: 705 mGy-cm    Comparison: 8/25/2022    Findings:     image(s) demonstrate(s) no gross focal airspace opacity.    Bones:    No fracture.    Moderate degenerative changes acromioclavicular joint. Subacromial  cystlike change. Acromiohumeral distance is preserved. Small  hyperaerated attenuation focus adjacent to greater tuberosity oblique  facet, presumably enthesopathic change (image 133 series 5).    Soft tissues:   Evaluation of the soft tissue, particularly internal derangement of  joint assessment is limited with CT technique.     Excellent distension of glenohumeral joint by intra-articularly  injected iodinated contrast. No evidence of abnormal contrast  extension into the subacromial/subdeltoid bursa to indicate presence  of full-thickness rotator cuff tear. Suspect low-grade articular sided  tear supraspinatus middle fibers adjacent to the footprint (image 168  series 9) infraspinatus posterior fibers adjacent to the footprint  (image 37 series 3).    Hypointense punctate dependent intra-articular foci, presumably  iatrogenic gas foci.    No substantial subacromial/subdeltoid bursal fluid.    Muscle bulk of the rotator cuff and deltoid are preserved.      Impression    Impression:  1. Suspect low-grade articular sided tear supraspinatus middle fibers  adjacent to the footprint and infraspinatus posterior fibers adjacent  to the footprint. No high grade articular  sided tear.  2. Muscle bulk of the rotator cuff is preserved.         [employer]         SYSTEM ID:  [id number]     *Note: Due to a large number of results and/or encounters for the requested time period, some results have not been displayed. A complete set of results can be found in Results Review.

## 2022-11-14 PROBLEM — G89.4 CHRONIC PAIN SYNDROME: Status: RESOLVED | Noted: 2018-08-08 | Resolved: 2022-11-14

## 2022-11-14 NOTE — TELEPHONE ENCOUNTER
No clear full thickness tear in shoulder, which is good. Appearance of tendinosis, or chronic tendon change. Also with some AC joint degenerative change, which is a chronic issue, not related to injury.  Advise PT next, monitor 4-6 weeks to start.  Additional consideration is subacromial steroid injection, based on last visit; could do this at a recheck appointment, and still would advise PT.  Please place PT order, and if desiring injection, can schedule appointment.  I would be happy to have a visit with the patient (in person, by video, or by phone) to discuss further if that would be helpful.  Thanks.  Christopher Sanderson, , CAQ

## 2022-11-14 NOTE — TELEPHONE ENCOUNTER
Patient LVM returning call for MRI results.   Call back [phone]    Keerthi Segovia MS ATC

## 2022-11-14 NOTE — TELEPHONE ENCOUNTER
FREDY for patient and she immediately returned the phone call.  Discussed CT results.  She would like an injection, and to have this done before December when she is in [place].   Appointment made for 11/19 to review MRI and undergo injection.   All questions were answered  Keerthi Segovia MS, ATC

## 2022-11-14 NOTE — TELEPHONE ENCOUNTER
"Called, LVM regarding her MRI results and next steps;    \"No clear full thickness tear in shoulder, which is good. Appearance of tendinosis, or chronic tendon change. Also with some AC joint degenerative change, which is a chronic issue, not related to injury.  Advise PT next, monitor 4-6 weeks to start.  Additional consideration is subacromial steroid injection, based on last visit; could do this at a recheck appointment, and still would advise PT.  Please place PT order, and if desiring injection, can schedule appointment.  I would be happy to have a visit with the patient (in person, by video, or by phone) to discuss further if that would be helpful.  Thanks.  Christopher Sanderson DO, CAQ\"    Nataliia Jara ATC, [facility]    "

## 2022-11-15 ENCOUNTER — VIRTUAL VISIT (OUTPATIENT)
Dept: EDUCATION SERVICES | Facility: CLINIC | Age: 67
End: 2022-11-15
Payer: COMMERCIAL

## 2022-11-15 DIAGNOSIS — M17.10 UNILATERAL PRIMARY OSTEOARTHRITIS, UNSPECIFIED KNEE: ICD-10-CM

## 2022-11-15 DIAGNOSIS — M25.561 CHRONIC PAIN OF RIGHT KNEE: ICD-10-CM

## 2022-11-15 DIAGNOSIS — Z79.4 TYPE 2 DIABETES MELLITUS WITH DIABETIC NEUROPATHY, WITH LONG-TERM CURRENT USE OF INSULIN (H): Primary | ICD-10-CM

## 2022-11-15 DIAGNOSIS — E11.40 TYPE 2 DIABETES MELLITUS WITH DIABETIC NEUROPATHY, WITH LONG-TERM CURRENT USE OF INSULIN (H): Primary | ICD-10-CM

## 2022-11-15 DIAGNOSIS — G89.29 CHRONIC PAIN OF RIGHT KNEE: ICD-10-CM

## 2022-11-15 PROCEDURE — G0108 DIAB MANAGE TRN  PER INDIV: HCPCS | Mod: 93

## 2022-11-15 NOTE — PATIENT INSTRUCTIONS
Take all medication consistently as prescribed.  Do not change medication doses until you have spoke with your provider.   Mikayla Sellers RN, BSN, CDCES   Certified Diabetes [facility]/  [facility]

## 2022-11-15 NOTE — PROGRESS NOTES
Diabetes Self-Management Education & Support    Presents for: Individual review    Type of Service: Telephone Visit    Originating Location (Patient Location): Home  Distant Location (Provider Location): [facility]  Mode of Communication:  Telephone    Telephone Visit Start Time: 1:00 PM  Telephone Visit End Time: 1:30 PM    How would patient like to obtain AVS? N/a  Assessment Type:   ASSESSMENT:  Telephone visit today to review bg and diabetes medication. Pt seen in ED on 22 for hyperglycemia. Suspected, missed medication.     The patient was diagnosed with Type 2 Diabetes in . She alternates living with two daughters, Cata and Emilia (Raymundo). She is currently living with Cata who is somewhat knowledgeable about diabetes and its mgmt. Cata requests further diabetes education.     Since diagnosis, Holly has struggled with self mgmt care due to mental health issues and poor diet. She has a history of depression, disordered sleep patterns and craves sweets.    BG while on the phone with pt:   Fasting, this mornin. 2 1.5 hrs post breakfast: 128.     Pt's daughter has been helping pt monitor bg, take medicaiotn and eat healthier since hospital discharge. When the patient has help with diabetes self-mgmt care, bg levels  improve. When pt self-manages her care, bg control declines.     Pt has memory issues. Sometimes forgets to take medication. Over the last several days, the patient decided to increase Metformin dose on her own from 2000mg daily to 3000mg daily. Pt and daughter advised to return to taking 2000mg daily as this maximum dose recommended.    Pt could not remember recommended dose of Glimepirde. Daughter and pt advised pt is to be taking 4mg daily, in the morning.         Patient's most recent   Lab Results   Component Value Date    A1C 8.5 2022    A1C 8.9 2021     is not meeting goal of <7.0      Diabetes knowledge and skills assessment:    Patient is knowledgeable in diabetes management concepts related to: Monitoring. Medication    Continue education with the following diabetes management concepts: Problem Solving    Based on learning assessment above, most appropriate setting for further diabetes education would be: Individual setting.      PLAN  Recommended to have patient's medication regemin overseen as often as possible.     Pt and daughter advised best for patient to meet with a dietician to discuss in further detail, healthy eating with diabetes. Daughter given phone number to connect with Serena Caro RD.     No medication dose changes were made today.     Topics to cover at upcoming visits: Taking Medication, pathophysiology of diabetes, problem solving.     Follow-up: with Dr Medrano in Jan.     Education Materials Provided:  No new materials provided today      SUBJECTIVE/OBJECTIVE:  Presents for: Individual review  Accompanied by: Daughter  Diabetes education in the past 24mo: Yes  Focus of Visit: Taking Medication  Diabetes type: Type 2  Disease course: Worsening  How confident are you filling out medical forms by yourself:: A little bit  Diabetes management related comments/concerns: Elevated bg levels.  Transportation concerns: No  Difficulty affording diabetes medication?: No  Difficulty affording diabetes testing supplies?: No  Other concerns:: Cognitive impairment (Memory issues)  Cultural Influences/Ethnic Background:  Not  or       Diabetes Symptoms & Complications:  Fatigue: No  Neuropathy: Yes  Polydipsia: No  Polyphagia: No  Polyuria: No  Visual change: No  Slow healing wounds: No  Other: No  Weight trend: Stable  Complications assessed today?: No    Patient Problem List and Family Medical History reviewed for relevant medical history, current medical status, and diabetes risk factors.    Vitals:  There were no vitals taken for this visit.  Estimated body mass index is 37.49 kg/m  as calculated from the  "following:    Height as of 9/14/22: 1.575 m (5' 2\").    Weight as of 9/14/22: 93 kg (205 lb).   Last 3 BP:   BP Readings from Last 3 Encounters:   09/14/22 130/80   08/25/22 130/83   07/14/22 138/81       History   Smoking Status     Former     Packs/day: 0.00     Types: Cigarettes     Quit date: 8/9/2016   Smokeless Tobacco     Never       Labs:  Lab Results   Component Value Date    A1C 8.5 07/14/2022    A1C 8.9 11/12/2021     Lab Results   Component Value Date     04/22/2022     08/24/2020     Lab Results   Component Value Date    LDL 39 10/08/2021    LDL 26 06/18/2020     HDL Cholesterol   Date Value Ref Range Status   06/18/2020 78 >49 mg/dL Final     Direct Measure HDL   Date Value Ref Range Status   10/08/2021 76 >=50 mg/dL Final   ]  GFR Estimate   Date Value Ref Range Status   04/22/2022 81 >60 mL/min/1.73m2 Final     Comment:     Effective December 21, 2021 eGFRcr in adults is calculated using the 2021 CKD-EPI creatinine equation which includes age and gender (Isaac et al., NEJ, DOI: 10.1056/FVRCqg1180916)   08/24/2020 >90 >60 mL/min/[1.73_m2] Final     Comment:     Non  GFR Calc  Starting 12/18/2018, serum creatinine based estimated GFR (eGFR) will be   calculated using the Chronic Kidney Disease Epidemiology Collaboration   (CKD-EPI) equation.       GFR Estimate If Black   Date Value Ref Range Status   08/24/2020 >90 >60 mL/min/[1.73_m2] Final     Comment:      GFR Calc  Starting 12/18/2018, serum creatinine based estimated GFR (eGFR) will be   calculated using the Chronic Kidney Disease Epidemiology Collaboration   (CKD-EPI) equation.       Lab Results   Component Value Date    CR 0.80 04/22/2022    CR 0.68 08/24/2020     No results found for: MICROALBUMIN    Healthy Eating:  Healthy Eating Assessed Today: No  Cultural/Pentecostal diet restrictions?: No  Has patient met with a dietitian in the past?: Yes    Being Active:  Being Active Assessed Today: " No    Monitoring:  Monitoring Assessed Today: Yes      Taking Medications:  Diabetes Medication(s)     Biguanides       metFORMIN (GLUCOPHAGE) 1000 MG tablet    Take 1 tablet (1,000 mg) by mouth 2 times daily (with meals)    Insulin       insulin glargine (BASAGLAR KWIKPEN) 100 UNIT/ML pen    TAKE 56 UNITS SUBCUTANEOUSLY DAILY.    Sulfonylureas       glimepiride (AMARYL) 4 MG tablet    Take 1 tablet (4 mg) by mouth every morning (before breakfast)    Insulin Sensitizing Agents       pioglitazone (ACTOS) 45 MG tablet    Take 1 tablet (45 mg) by mouth daily          Taking Medication Assessed Today: Yes  Current Treatments: Insulin Injections, Oral Medication (taken by mouth)  Given by: Patient (and daughter)  Problems taking diabetes medications regularly?: Yes  Diabetes medication side effects?: No    Problem Solving:  Problem Solving Assessed Today: Yes  Is the patient at risk for hypoglycemia?: Yes  Hypoglycemia Frequency: Rarely  Hypoglycemia Treatment: Juice, Candy  Medical ID: No  Does patient have glucagon emergency kit?: No  Is the patient at risk for DKA?: No    Hypoglycemia symptoms  Dizziness or Light-Headedness: Yes  Feeling shaky: Yes    Hypoglycemia Complications  Blackouts: No  Hospitalization: No  Nocturnal hypoglycemia: No  Required assistance: No  Required glucagon injection: No  Seizures: No    Reducing Risks:  Reducing Risks Assessed Today: No  Diabetes Risks: Sedentary Lifestyle  CAD Risks: Diabetes Mellitus, Obesity, Sedentary lifestyle  Has dilated eye exam at least once a year?: Yes  Feet checked by healthcare provider in the last year?: Yes    Healthy Coping:  Healthy Coping Assessed Today: No  Emotional response to diabetes: Ready to learn  Stage of change: PREPARATION (Decided to change - considering how)  Support resources: None  Patient Activation Measure Survey Score:  RADHA Score (Last Two) 9/15/2011 1/5/2012   RADHA Raw Score 30 35   Activation Score 37.3 45.2   RADHA Level 1 1         Time  Spent: 30 minutes  Encounter Type: Individual    Any diabetes medication dose changes were made via the CDE Protocol per the patient's endocrinology provider. A copy of this encounter was shared with the provider.    Holly Muir comes into clinic today at the request of Dr Medrano, Ordering Provider for Pt Teaching .    This service provided today was under the supervising provider of the day Dr Nixon,  who was available if needed.    Mikayla Sellers RN, BSN, [facility]   Certified Diabetes Care/  [facility]

## 2022-11-15 NOTE — LETTER
11/15/2022         RE: Holly Muir  [address] [address] [id number]        Dear Colleague,    Thank you for referring your patient, Holly Muir, to the [facility]. Please see a copy of my visit note below.    Diabetes Self-Management Education & Support    Presents for: Individual review    Type of Service: Telephone Visit    Originating Location (Patient Location): Home  Distant Location (Provider Location): [facility]  Mode of Communication:  Telephone    Telephone Visit Start Time: 1:00 PM  Telephone Visit End Time: 1:30 PM    How would patient like to obtain AVS? N/a  Assessment Type:   ASSESSMENT:  Telephone visit today to review bg and diabetes medication. Pt seen in ED on 22 for hyperglycemia. Suspected, missed medication.     The patient was diagnosed with Type 2 Diabetes in . She alternates living with two daughters, Cata and Emilia (Raymundo). She is currently living with Cata who is somewhat knowledgeable about diabetes and its mgmt. Cata requests further diabetes education.     Since diagnosis, Holly has struggled with self mgmt care due to mental health issues and poor diet. She has a history of depression, disordered sleep patterns and craves sweets.    BG while on the phone with pt:   Fasting, this mornin. 2 1.5 hrs post breakfast: 128.     Pt's daughter has been helping pt monitor bg, take medicaiotn and eat healthier since hospital discharge. When the patient has help with diabetes self-mgmt care, bg levels  improve. When pt self-manages her care, bg control declines.     Pt has memory issues. Sometimes forgets to take medication. Over the last several days, the patient decided to increase Metformin dose on her own from 2000mg daily to 3000mg daily. Pt and daughter advised to return to taking 2000mg daily as this maximum dose recommended.    Pt could not remember recommended dose of Glimepirde.  Daughter and pt advised pt is to be taking 4mg daily, in the morning.         Patient's most recent   Lab Results   Component Value Date    A1C 8.5 07/14/2022    A1C 8.9 11/12/2021     is not meeting goal of <7.0      Diabetes knowledge and skills assessment:   Patient is knowledgeable in diabetes management concepts related to: Monitoring. Medication    Continue education with the following diabetes management concepts: Problem Solving    Based on learning assessment above, most appropriate setting for further diabetes education would be: Individual setting.      PLAN  Recommended to have patient's medication regemin overseen as often as possible.     Pt and daughter advised best for patient to meet with a dietician to discuss in further detail, healthy eating with diabetes. Daughter given phone number to connect with Serena Caro RD.     No medication dose changes were made today.     Topics to cover at upcoming visits: Taking Medication, pathophysiology of diabetes, problem solving.     Follow-up: with Dr Medrano in Jan.     Education Materials Provided:  No new materials provided today      SUBJECTIVE/OBJECTIVE:  Presents for: Individual review  Accompanied by: Daughter  Diabetes education in the past 24mo: Yes  Focus of Visit: Taking Medication  Diabetes type: Type 2  Disease course: Worsening  How confident are you filling out medical forms by yourself:: A little bit  Diabetes management related comments/concerns: Elevated bg levels.  Transportation concerns: No  Difficulty affording diabetes medication?: No  Difficulty affording diabetes testing supplies?: No  Other concerns:: Cognitive impairment (Memory issues)  Cultural Influences/Ethnic Background:  Not  or       Diabetes Symptoms & Complications:  Fatigue: No  Neuropathy: Yes  Polydipsia: No  Polyphagia: No  Polyuria: No  Visual change: No  Slow healing wounds: No  Other: No  Weight trend: Stable  Complications assessed today?:  "No    Patient Problem List and Family Medical History reviewed for relevant medical history, current medical status, and diabetes risk factors.    Vitals:  There were no vitals taken for this visit.  Estimated body mass index is 37.49 kg/m  as calculated from the following:    Height as of 9/14/22: 1.575 m (5' 2\").    Weight as of 9/14/22: 93 kg (205 lb).   Last 3 BP:   BP Readings from Last 3 Encounters:   09/14/22 130/80   08/25/22 130/83   07/14/22 138/81       History   Smoking Status     Former     Packs/day: 0.00     Types: Cigarettes     Quit date: 8/9/2016   Smokeless Tobacco     Never       Labs:  Lab Results   Component Value Date    A1C 8.5 07/14/2022    A1C 8.9 11/12/2021     Lab Results   Component Value Date     04/22/2022     08/24/2020     Lab Results   Component Value Date    LDL 39 10/08/2021    LDL 26 06/18/2020     HDL Cholesterol   Date Value Ref Range Status   06/18/2020 78 >49 mg/dL Final     Direct Measure HDL   Date Value Ref Range Status   10/08/2021 76 >=50 mg/dL Final   ]  GFR Estimate   Date Value Ref Range Status   04/22/2022 81 >60 mL/min/1.73m2 Final     Comment:     Effective December 21, 2021 eGFRcr in adults is calculated using the 2021 CKD-EPI creatinine equation which includes age and gender (Isaac et al., NEJM, DOI: 10.1056/ESWJdh8201056)   08/24/2020 >90 >60 mL/min/[1.73_m2] Final     Comment:     Non  GFR Calc  Starting 12/18/2018, serum creatinine based estimated GFR (eGFR) will be   calculated using the Chronic Kidney Disease Epidemiology Collaboration   (CKD-EPI) equation.       GFR Estimate If Black   Date Value Ref Range Status   08/24/2020 >90 >60 mL/min/[1.73_m2] Final     Comment:      GFR Calc  Starting 12/18/2018, serum creatinine based estimated GFR (eGFR) will be   calculated using the Chronic Kidney Disease Epidemiology Collaboration   (CKD-EPI) equation.       Lab Results   Component Value Date    CR 0.80 04/22/2022 "    CR 0.68 08/24/2020     No results found for: MICROALBUMIN    Healthy Eating:  Healthy Eating Assessed Today: No  Cultural/Mosque diet restrictions?: No  Has patient met with a dietitian in the past?: Yes    Being Active:  Being Active Assessed Today: No    Monitoring:  Monitoring Assessed Today: Yes      Taking Medications:  Diabetes Medication(s)     Biguanides       metFORMIN (GLUCOPHAGE) 1000 MG tablet    Take 1 tablet (1,000 mg) by mouth 2 times daily (with meals)    Insulin       insulin glargine (BASAGLAR KWIKPEN) 100 UNIT/ML pen    TAKE 56 UNITS SUBCUTANEOUSLY DAILY.    Sulfonylureas       glimepiride (AMARYL) 4 MG tablet    Take 1 tablet (4 mg) by mouth every morning (before breakfast)    Insulin Sensitizing Agents       pioglitazone (ACTOS) 45 MG tablet    Take 1 tablet (45 mg) by mouth daily          Taking Medication Assessed Today: Yes  Current Treatments: Insulin Injections, Oral Medication (taken by mouth)  Given by: Patient (and daughter)  Problems taking diabetes medications regularly?: Yes  Diabetes medication side effects?: No    Problem Solving:  Problem Solving Assessed Today: Yes  Is the patient at risk for hypoglycemia?: Yes  Hypoglycemia Frequency: Rarely  Hypoglycemia Treatment: Juice, Candy  Medical ID: No  Does patient have glucagon emergency kit?: No  Is the patient at risk for DKA?: No    Hypoglycemia symptoms  Dizziness or Light-Headedness: Yes  Feeling shaky: Yes    Hypoglycemia Complications  Blackouts: No  Hospitalization: No  Nocturnal hypoglycemia: No  Required assistance: No  Required glucagon injection: No  Seizures: No    Reducing Risks:  Reducing Risks Assessed Today: No  Diabetes Risks: Sedentary Lifestyle  CAD Risks: Diabetes Mellitus, Obesity, Sedentary lifestyle  Has dilated eye exam at least once a year?: Yes  Feet checked by healthcare provider in the last year?: Yes    Healthy Coping:  Healthy Coping Assessed Today: No  Emotional response to diabetes: Ready to  learn  Stage of change: PREPARATION (Decided to change - considering how)  Support resources: None  Patient Activation Measure Survey Score:  RADHA Score (Last Two) 9/15/2011 1/5/2012   RADHA Raw Score 30 35   Activation Score 37.3 45.2   RADHA Level 1 1         Time Spent: 30 minutes  Encounter Type: Individual    Any diabetes medication dose changes were made via the CDE Protocol per the patient's endocrinology provider. A copy of this encounter was shared with the provider.    Holly Muir comes into clinic today at the request of Dr Medrano, Ordering Provider for Pt Teaching .    This service provided today was under the supervising provider of the day Dr Nixon,  who was available if needed.    Mikayla Sellers RN, BSN, [facility]   Certified Diabetes Care/  [facility]      Again, thank you for allowing me to participate in the care of your patient.        Sincerely,        Mikayla Sellers RN

## 2022-11-16 ENCOUNTER — TELEPHONE (OUTPATIENT)
Dept: FAMILY MEDICINE | Facility: CLINIC | Age: 67
End: 2022-11-16

## 2022-11-16 NOTE — TELEPHONE ENCOUNTER
01/08/21: Cde spoke with patient. Advised to decrease Lantus from 40 units daily (takes in pm) to 30 units daily. Call clinic next week if low bg levels continue to be a probem. Pt verbalized understanding.    Mikayla Sellers, RN, BSN, CDE   [facility]   No

## 2022-11-16 NOTE — TELEPHONE ENCOUNTER
"Patient calling in requesting refills of medications.     Patient states that she received a call from [facility] in [place] that they needed \"Dr. King to [place] medication refills.\" Patient states that she is not sure which medications need provider approval for refill, patient believes that is it \"quite a few\". Writer notes only pending refill is Tramadol, which is currently in provider's in-basket.     Informed patient that quite a few of her medications were just recently refilled, but we would need specifics about which medications the pharmacy is asking about in order to process the request. Patient verbalized understanding, will call pharmacy to ask them to reach out to us re: specific refill requests. No further questions from patient at this time.        NATE Null, RN  [facility]    "

## 2022-11-17 NOTE — TELEPHONE ENCOUNTER
Paroxetine is prescribed by her psychiatrist. Lucy ask pt to contact her psychiatrist at [facility].

## 2022-11-17 NOTE — TELEPHONE ENCOUNTER
Patient calling, states that the only medication that the pharmacy is needing approval to fill is PARoxetine (PAXIL) 20 MG tablet    [id number] - [place], [place] - [address] AT [facility]

## 2022-11-17 NOTE — TELEPHONE ENCOUNTER
Notified patient regaurding note below. Patient will call [facility]. No further needs at this time

## 2022-11-18 RX ORDER — TRAMADOL HYDROCHLORIDE 50 MG/1
50 TABLET ORAL 2 TIMES DAILY PRN
Qty: 30 TABLET | Refills: 0 | Status: SHIPPED | OUTPATIENT
Start: 2022-11-18 | End: 2022-12-06

## 2022-11-18 NOTE — TELEPHONE ENCOUNTER
Patient called to reschedule procedure, states she dosen't have a  for today, patient rescheduled 1/12/23 as she will be out of town in December

## 2022-11-19 ENCOUNTER — OFFICE VISIT (OUTPATIENT)
Dept: ORTHOPEDICS | Facility: CLINIC | Age: 67
End: 2022-11-19
Payer: COMMERCIAL

## 2022-11-19 VITALS
BODY MASS INDEX: 36.8 KG/M2 | HEIGHT: 62 IN | WEIGHT: 200 LBS | DIASTOLIC BLOOD PRESSURE: 71 MMHG | SYSTOLIC BLOOD PRESSURE: 110 MMHG

## 2022-11-19 DIAGNOSIS — M79.621 PAIN OF RIGHT UPPER ARM: ICD-10-CM

## 2022-11-19 DIAGNOSIS — S49.91XD INJURY OF RIGHT SHOULDER, SUBSEQUENT ENCOUNTER: Primary | ICD-10-CM

## 2022-11-19 PROCEDURE — 99213 OFFICE O/P EST LOW 20 MIN: CPT | Mod: 25 | Performed by: PEDIATRICS

## 2022-11-19 PROCEDURE — 20610 DRAIN/INJ JOINT/BURSA W/O US: CPT | Mod: RT | Performed by: PEDIATRICS

## 2022-11-19 RX ORDER — LIDOCAINE HYDROCHLORIDE 10 MG/ML
2 INJECTION, SOLUTION INFILTRATION; PERINEURAL
Status: DISCONTINUED | OUTPATIENT
Start: 2022-11-19 | End: 2023-07-05

## 2022-11-19 RX ORDER — TRIAMCINOLONE ACETONIDE 40 MG/ML
40 INJECTION, SUSPENSION INTRA-ARTICULAR; INTRAMUSCULAR
Status: DISCONTINUED | OUTPATIENT
Start: 2022-11-19 | End: 2023-07-05

## 2022-11-19 RX ADMIN — TRIAMCINOLONE ACETONIDE 40 MG: 40 INJECTION, SUSPENSION INTRA-ARTICULAR; INTRAMUSCULAR at 10:30

## 2022-11-19 RX ADMIN — LIDOCAINE HYDROCHLORIDE 2 ML: 10 INJECTION, SOLUTION INFILTRATION; PERINEURAL at 10:30

## 2022-11-19 NOTE — LETTER
11/19/2022         RE: Holly Muir  [id number] [facility] [id number]        Dear Colleague,    Thank you for referring your patient, Holly Muir, to the [facility]. Please see a copy of my visit note below.    ASSESSMENT & PLAN    Holly was seen today for follow up, pain and results.    Diagnoses and all orders for this visit:    Injury of right shoulder, subsequent encounter  -     Physical Therapy Referral; Future  -     Large Joint Injection/Arthocentesis: R subacromial bursa    Pain of right upper arm  -     Physical Therapy Referral; Future  -     Large Joint Injection/Arthocentesis: R subacromial bursa            See Patient Instructions  Patient Instructions   CT of the right shoulder does not show any full-thickness tearing.  There may be some partial-thickness tearing present.  We also discussed potential presence of some component of frozen shoulder syndrome, given history of diabetes and limited range of motion.  Reviewed options for next steps with physical therapy, injections.  Right shoulder subacromial steroid injection done today, with the goal of pain relief.  Also plan physical therapy, referral placed.  Okay to delay starting therapy until after you return from upcoming time out of town.  Plan to monitor course with PT exercises 1 month to start.  If persistent issues after that time, or if any worsening or changing in the meantime, contact clinic.  Future consideration could be imaging guided steroid injection to the shoulder joint, if persistent shoulder area tightness.      If you have any further questions for your physician or physician s care team you can contact them thru [employer] or by calling  [phone] and use option 3 to leave a voice message.   Messages received during business hours will be returned same day.            Injection Discharge Instructions      You may shower, however avoid swimming, tub baths or hot  tubs for 24 hours following your procedure    You may have a mild to moderate increase in pain for several days following the injection.    It may take up to 14 days for the steroid medication to start working although you may feel the effect as early as a few days after the procedure.    You may use ice packs for 10-15 minutes, 3 to 4 times a day at the injection site for comfort    You may use anti-inflammatory medications (such as Ibuprofen or Aleve or Advil) if you are able to take safely, or acetaminophen (Tylenol) for pain control if necessary    If you were fasting, you may resume your normal diet and medications after the procedure    If you have diabetes, check your blood sugar more frequently than usual as your blood sugar may be higher than normal for 10-14 days following a steroid injection. Contact your doctor who manages your diabetes if your blood sugar is higher than usual      If you experience any of the following, call the clinic @ [phone]  - Fever over 100 degree F  - Swelling, bleeding, redness, drainage, warmth at the injection site  - New or worsening pain        [facility]    SUBJECTIVE- Interim History November 19, 2022    Chief Complaint   Patient presents with     Right Shoulder - Follow Up, Pain, Results       Holly Muir is a 67 year old female who is seen in f/u up for    Injury of right shoulder, subsequent encounter  Pain of right upper arm. Since last visit on 9/14/22 patient has moderate right posterior lateral shoulder pain.  She presents today to review results of right shoulder CT completed on 11/11/22 and discuss steroid injection.  - Now ~ 5 months from initial onset    Worsened by: any use of arm   Better with: nothing   Treatments tried: ice, heat and topical analgesics   Associated symptoms: swelling, weakness     The patient is seen by themselves.  The patient is Right handed    Orthopedic/Surgical  "history: NO  Social History/Occupation: disability     No family history pertinent to patient's problem today.     REVIEW OF SYSTEMS:  Review of Systems      OBJECTIVE:  /71   Ht 1.575 m (5' 2\")   Wt 90.7 kg (200 lb)   BMI 36.58 kg/m           Right shoulder:  Motion improved compared to last visit    ROM:      forward flexion 130-140, tight per pt, some pain end range        abduction ~120, tight per pt       internal rotation mid lumbar, tightness per pt, mild pain       external rotation grossly intact  Passive > active          RADIOLOGY:  Final results and radiologist's interpretation, available in the [facility] health record.  Images were reviewed with the patient in the office today.  My personal interpretation of the performed imaging: no full thickness cuff tear.      Results for orders placed or performed in visit on 11/11/22   CT Shoulder Right w Contrast    Narrative    Exam: CT SHOULDER RIGHT W CONTRAST 11/11/2022 10:32 AM    History: shoulder injury, unable to have MRI due to implanted device;  Shoulder injury, right, initial encounter; Fall, initial encounter    Techniques: Multislice CT examination was performed of the right  shoulder with multiplanar reconstructions displayed in multiple window  settings. Imaging was performed after the administration of  intra-articular contrast material.     DLP: 705 mGy-cm    Comparison: 8/25/2022    Findings:     image(s) demonstrate(s) no gross focal airspace opacity.    Bones:    No fracture.    Moderate degenerative changes acromioclavicular joint. Subacromial  cystlike change. Acromiohumeral distance is preserved. Small  hyperaerated attenuation focus adjacent to greater tuberosity oblique  facet, presumably enthesopathic change (image 133 series 5).    Soft tissues:   Evaluation of the soft tissue, particularly internal derangement of  joint assessment is limited with CT technique.     Excellent distension of glenohumeral joint by " intra-articularly  injected iodinated contrast. No evidence of abnormal contrast  extension into the subacromial/subdeltoid bursa to indicate presence  of full-thickness rotator cuff tear. Suspect low-grade articular sided  tear supraspinatus middle fibers adjacent to the footprint (image 168  series 9) infraspinatus posterior fibers adjacent to the footprint  (image 37 series 3).    Hypointense punctate dependent intra-articular foci, presumably  iatrogenic gas foci.    No substantial subacromial/subdeltoid bursal fluid.    Muscle bulk of the rotator cuff and deltoid are preserved.      Impression    Impression:  1. Suspect low-grade articular sided tear supraspinatus middle fibers  adjacent to the footprint and infraspinatus posterior fibers adjacent  to the footprint. No high grade articular sided tear.  2. Muscle bulk of the rotator cuff is preserved.         [employer]         SYSTEM ID:  [id number]     *Note: Due to a large number of results and/or encounters for the requested time period, some results have not been displayed. A complete set of results can be found in Results Review.       Large Joint Injection/Arthocentesis: R subacromial bursa    Date/Time: 11/19/2022 10:30 AM  Performed by: Christopher Sanderson DO  Authorized by: Christopher Sanderson DO     Indications:  Pain  Needle Size:  25 G  Guidance: landmark guided    Approach:  Lateral  Location:  Shoulder      Site:  R subacromial bursa  Medications:  40 mg triamcinolone 40 MG/ML; 2 mL lidocaine 1 %  Outcome:  Tolerated well, no immediate complications  Procedure discussed: discussed risks, benefits, and alternatives    Consent Given by:  Patient  Timeout: timeout called immediately prior to procedure    Prep: patient was prepped and draped in usual sterile fashion            20 minutes spent on the date of the encounter doing chart review, history and exam, documentation and further activities per the note, not including injection          Again, thank you for allowing me to participate in the care of your patient.        Sincerely,        Christopher Sanderson, DO

## 2022-11-19 NOTE — PROGRESS NOTES
ASSESSMENT & PLAN    Holly was seen today for follow up, pain and results.    Diagnoses and all orders for this visit:    Injury of right shoulder, subsequent encounter  -     Physical Therapy Referral; Future  -     Large Joint Injection/Arthocentesis: R subacromial bursa    Pain of right upper arm  -     Physical Therapy Referral; Future  -     Large Joint Injection/Arthocentesis: R subacromial bursa            See Patient Instructions  Patient Instructions   CT of the right shoulder does not show any full-thickness tearing.  There may be some partial-thickness tearing present.  We also discussed potential presence of some component of frozen shoulder syndrome, given history of diabetes and limited range of motion.  Reviewed options for next steps with physical therapy, injections.  Right shoulder subacromial steroid injection done today, with the goal of pain relief.  Also plan physical therapy, referral placed.  Okay to delay starting therapy until after you return from upcoming time out of town.  Plan to monitor course with PT exercises 1 month to start.  If persistent issues after that time, or if any worsening or changing in the meantime, contact clinic.  Future consideration could be imaging guided steroid injection to the shoulder joint, if persistent shoulder area tightness.      If you have any further questions for your physician or physician s care team you can contact them thru [employer] or by calling  [phone] and use option 3 to leave a voice message.   Messages received during business hours will be returned same day.            Injection Discharge Instructions      You may shower, however avoid swimming, tub baths or hot tubs for 24 hours following your procedure    You may have a mild to moderate increase in pain for several days following the injection.    It may take up to 14 days for the steroid medication to start working although you may feel the effect as early as a few days after the  "procedure.    You may use ice packs for 10-15 minutes, 3 to 4 times a day at the injection site for comfort    You may use anti-inflammatory medications (such as Ibuprofen or Aleve or Advil) if you are able to take safely, or acetaminophen (Tylenol) for pain control if necessary    If you were fasting, you may resume your normal diet and medications after the procedure    If you have diabetes, check your blood sugar more frequently than usual as your blood sugar may be higher than normal for 10-14 days following a steroid injection. Contact your doctor who manages your diabetes if your blood sugar is higher than usual      If you experience any of the following, call the clinic @ [phone]  - Fever over 100 degree F  - Swelling, bleeding, redness, drainage, warmth at the injection site  - New or worsening pain        [facility]    SUBJECTIVE- Interim History November 19, 2022    Chief Complaint   Patient presents with     Right Shoulder - Follow Up, Pain, Results       Holly Muir is a 67 year old female who is seen in f/u up for    Injury of right shoulder, subsequent encounter  Pain of right upper arm. Since last visit on 9/14/22 patient has moderate right posterior lateral shoulder pain.  She presents today to review results of right shoulder CT completed on 11/11/22 and discuss steroid injection.  - Now ~ 5 months from initial onset    Worsened by: any use of arm   Better with: nothing   Treatments tried: ice, heat and topical analgesics   Associated symptoms: swelling, weakness     The patient is seen by themselves.  The patient is Right handed    Orthopedic/Surgical history: NO  Social History/Occupation: disability     No family history pertinent to patient's problem today.     REVIEW OF SYSTEMS:  Review of Systems      OBJECTIVE:  /71   Ht 1.575 m (5' 2\")   Wt 90.7 kg (200 lb)   BMI 36.58 kg/m           Right shoulder:  Motion " improved compared to last visit    ROM:      forward flexion 130-140, tight per pt, some pain end range        abduction ~120, tight per pt       internal rotation mid lumbar, tightness per pt, mild pain       external rotation grossly intact  Passive > active          RADIOLOGY:  Final results and radiologist's interpretation, available in the [facility] health record.  Images were reviewed with the patient in the office today.  My personal interpretation of the performed imaging: no full thickness cuff tear.      Results for orders placed or performed in visit on 11/11/22   CT Shoulder Right w Contrast    Narrative    Exam: CT SHOULDER RIGHT W CONTRAST 11/11/2022 10:32 AM    History: shoulder injury, unable to have MRI due to implanted device;  Shoulder injury, right, initial encounter; Fall, initial encounter    Techniques: Multislice CT examination was performed of the right  shoulder with multiplanar reconstructions displayed in multiple window  settings. Imaging was performed after the administration of  intra-articular contrast material.     DLP: 705 mGy-cm    Comparison: 8/25/2022    Findings:     image(s) demonstrate(s) no gross focal airspace opacity.    Bones:    No fracture.    Moderate degenerative changes acromioclavicular joint. Subacromial  cystlike change. Acromiohumeral distance is preserved. Small  hyperaerated attenuation focus adjacent to greater tuberosity oblique  facet, presumably enthesopathic change (image 133 series 5).    Soft tissues:   Evaluation of the soft tissue, particularly internal derangement of  joint assessment is limited with CT technique.     Excellent distension of glenohumeral joint by intra-articularly  injected iodinated contrast. No evidence of abnormal contrast  extension into the subacromial/subdeltoid bursa to indicate presence  of full-thickness rotator cuff tear. Suspect low-grade articular sided  tear supraspinatus middle fibers adjacent to the footprint (image  168  series 9) infraspinatus posterior fibers adjacent to the footprint  (image 37 series 3).    Hypointense punctate dependent intra-articular foci, presumably  iatrogenic gas foci.    No substantial subacromial/subdeltoid bursal fluid.    Muscle bulk of the rotator cuff and deltoid are preserved.      Impression    Impression:  1. Suspect low-grade articular sided tear supraspinatus middle fibers  adjacent to the footprint and infraspinatus posterior fibers adjacent  to the footprint. No high grade articular sided tear.  2. Muscle bulk of the rotator cuff is preserved.         [employer]         SYSTEM ID:  [id number]     *Note: Due to a large number of results and/or encounters for the requested time period, some results have not been displayed. A complete set of results can be found in Results Review.       Large Joint Injection/Arthocentesis: R subacromial bursa    Date/Time: 11/19/2022 10:30 AM  Performed by: Christopher Sanderson DO  Authorized by: Christopher Sanderson DO     Indications:  Pain  Needle Size:  25 G  Guidance: landmark guided    Approach:  Lateral  Location:  Shoulder      Site:  R subacromial bursa  Medications:  40 mg triamcinolone 40 MG/ML; 2 mL lidocaine 1 %  Outcome:  Tolerated well, no immediate complications  Procedure discussed: discussed risks, benefits, and alternatives    Consent Given by:  Patient  Timeout: timeout called immediately prior to procedure    Prep: patient was prepped and draped in usual sterile fashion            20 minutes spent on the date of the encounter doing chart review, history and exam, documentation and further activities per the note, not including injection

## 2022-11-19 NOTE — PATIENT INSTRUCTIONS
CT of the right shoulder does not show any full-thickness tearing.  There may be some partial-thickness tearing present.  We also discussed potential presence of some component of frozen shoulder syndrome, given history of diabetes and limited range of motion.  Reviewed options for next steps with physical therapy, injections.  Right shoulder subacromial steroid injection done today, with the goal of pain relief.  Also plan physical therapy, referral placed.  Okay to delay starting therapy until after you return from upcoming time out of town.  Plan to monitor course with PT exercises 1 month to start.  If persistent issues after that time, or if any worsening or changing in the meantime, contact clinic.  Future consideration could be imaging guided steroid injection to the shoulder joint, if persistent shoulder area tightness.      If you have any further questions for your physician or physician s care team you can contact them thru [employer] or by calling  [phone] and use option 3 to leave a voice message.   Messages received during business hours will be returned same day.            Injection Discharge Instructions    You may shower, however avoid swimming, tub baths or hot tubs for 24 hours following your procedure  You may have a mild to moderate increase in pain for several days following the injection.  It may take up to 14 days for the steroid medication to start working although you may feel the effect as early as a few days after the procedure.  You may use ice packs for 10-15 minutes, 3 to 4 times a day at the injection site for comfort  You may use anti-inflammatory medications (such as Ibuprofen or Aleve or Advil) if you are able to take safely, or acetaminophen (Tylenol) for pain control if necessary  If you were fasting, you may resume your normal diet and medications after the procedure  If you have diabetes, check your blood sugar more frequently than usual as your blood sugar may be higher  than normal for 10-14 days following a steroid injection. Contact your doctor who manages your diabetes if your blood sugar is higher than usual    If you experience any of the following, call the clinic @ [phone]  - Fever over 100 degree F  - Swelling, bleeding, redness, drainage, warmth at the injection site  - New or worsening pain

## 2022-11-21 ENCOUNTER — PATIENT OUTREACH (OUTPATIENT)
Dept: GERIATRIC MEDICINE | Facility: CLINIC | Age: 67
End: 2022-11-21

## 2022-11-21 NOTE — PROGRESS NOTES
[facility] Care Coordination Contact  CC received notification of Emergency Room visit.  ER visit occurred on 11/13/2022 at [facility]  with Dx of Hyperglycemia .    CC contacted member and reviewed discharge summary.  Member has a follow-up appointment with PCP: Yes: scheduled on already completed with diabetic educator on 11/15/2022  Member has had a change in condition: No  New referrals placed: No  Home Visit Needed: No  Care plan reviewed and updated.  PCP notified of ED visit via EMR.    Divya Smalls RN,BSN  [facility] Case Coordinator   [phone]      
[facility]

## 2022-11-25 ENCOUNTER — NURSE TRIAGE (OUTPATIENT)
Dept: NURSING | Facility: CLINIC | Age: 67
End: 2022-11-25

## 2022-11-25 NOTE — TELEPHONE ENCOUNTER
Pt's son in law, Omari calling (C2C on file). He is concerned because he believes his mother in law has taken an extra Metformin.    Caller states that he was looking through Holly's pill boxes, and noticed that her night time pills are already gone for tonight. He believes she took an extra Metformin last night, so she could eat more food. He says he has not confirmed this with her though.    She is with them currently, but very sleepy and has been vomiting since last night. Her blood sugar at 12pm was 169 and while on the phone, they checked it and it was 162. Pt was alert when woken up.    Protocol recommends calling Poison Control for taking double the amount of a prescription drug and having symptoms. Caller transferred to Poison Control.    Kelly Tellez, RN, BSN  [facility]   Triage Nurse Advisor        Reason for Disposition    DOUBLE DOSE (an extra dose or lesser amount) of prescription drug and any symptoms (e.g., dizziness, nausea, pain, sleepiness)    Additional Information    Negative: Drug overdose and triager unable to answer question    Negative: Caller requesting a renewal or refill of a medicine patient is currently taking    Negative: Caller requesting information unrelated to medicine    Negative: Caller requesting information about COVID-19 Vaccine    Negative: Caller requesting information about Emergency Contraception    Negative: Caller requesting information about Combined Birth Control Pills    Negative: Caller requesting information about Progestin Birth Control Pills    Negative: Caller requesting information about Post-Op pain or medicines    Negative: Caller requesting a prescription antibiotic (such as penicillin) for Strep throat and has a positive culture result    Negative: Caller requesting a prescription anti-viral med (such as Tamiflu) and has influenza (flu) symptoms    Negative: Immunization reaction suspected    Negative: Rash while taking a medicine or within 3 days of  stopping it    Negative: Asthma and having symptoms of asthma (cough, wheezing, etc.)    Negative: Symptom of illness (e.g., headache, abdominal pain, earache, vomiting) that are more than mild    Negative: Breastfeeding questions about mother's medicines and diet    Negative: Intentional drug overdose and suicidal thoughts or ideas    Negative: MORE THAN A DOUBLE DOSE of a prescription or over-the-counter (OTC) drug    Protocols used: MEDICATION QUESTION CALL-A-[place]

## 2022-11-29 PROBLEM — M54.50 CHRONIC BILATERAL LOW BACK PAIN WITHOUT SCIATICA: Status: RESOLVED | Noted: 2022-07-12 | Resolved: 2022-11-29

## 2022-11-29 PROBLEM — G89.29 CHRONIC BILATERAL LOW BACK PAIN WITHOUT SCIATICA: Status: RESOLVED | Noted: 2022-07-12 | Resolved: 2022-11-29

## 2022-11-29 NOTE — PROGRESS NOTES
Discharge Note    Progress reporting period is from initial evaluation date (please see noted date below) to Aug 2, 2022.  No linked episodes      Holly failed to follow up and current status is unknown.  Please see information below for last relevant information on current status.  Patient seen for 4 visits.    SUBJECTIVE  Subjective changes noted by patient:  patient notes that her symptoms are remaining constant on the R side of the back.  She continues to have increased symptoms with washing dishes and with even cooking just one egg.  She felt symptoms into the R lateral hip and prox thigh after doing the dishes.  .  Current pain level is 2/10.     Previous pain level was  7/10.   Changes in function:  Yes (See Goal flowsheet attached for changes in current functional level)  Adverse reaction to treatment or activity: None    OBJECTIVE  Changes noted in objective findings: (-) seated SLR.. LROM FIS finger tips to knees with central low back pain, EIS nil/min with inc ache low back, R glute, contralateral stretch for ea SGIS (symmetrical ROM)     ASSESSMENT/PLAN  Diagnosis: chronic B (R>L) LBP without sciatica   Updated problem list and treatment plan:   Pain - HEP  Decreased ROM/flexibility - HEP  Decreased function - HEP  Decreased strength - HEP  Decreased proprioception - HEP  STG/LTGs have been met or progress has been made towards goals:  Yes, please see goal flowsheet for most current information  Assessment of Progress: current status is unknown.    Last current status: Pt is progressing slower than anticipated   Self Management Plans:  HEP  I have re-evaluated this patient and find that the nature, scope, duration and intensity of the therapy is appropriate for the medical condition of the patient.  Holly continues to require the following intervention to meet STG and LTG's:  HEP.    Recommendations:  Discharge with current home program.  Patient to follow up with MD as needed.    Please refer to the  daily flowsheet for treatment today, total treatment time and time spent performing 1:1 timed codes.

## 2022-12-01 ENCOUNTER — PATIENT OUTREACH (OUTPATIENT)
Dept: GERIATRIC MEDICINE | Facility: CLINIC | Age: 67
End: 2022-12-01

## 2022-12-08 ENCOUNTER — TELEPHONE (OUTPATIENT)
Dept: FAMILY MEDICINE | Facility: CLINIC | Age: 67
End: 2022-12-08

## 2022-12-08 ENCOUNTER — OFFICE VISIT (OUTPATIENT)
Dept: ORTHOPEDICS | Facility: CLINIC | Age: 67
End: 2022-12-08
Payer: COMMERCIAL

## 2022-12-08 DIAGNOSIS — M17.11 PRIMARY OSTEOARTHRITIS OF RIGHT KNEE: Primary | ICD-10-CM

## 2022-12-08 PROCEDURE — 99214 OFFICE O/P EST MOD 30 MIN: CPT | Performed by: FAMILY MEDICINE

## 2022-12-08 RX ORDER — PREDNISONE 20 MG/1
40 TABLET ORAL DAILY
Qty: 10 TABLET | Refills: 0 | Status: SHIPPED | OUTPATIENT
Start: 2022-12-08 | End: 2022-12-13

## 2022-12-08 ASSESSMENT — PAIN SCALES - GENERAL: PAINLEVEL: EXTREME PAIN (8)

## 2022-12-08 NOTE — LETTER
12/8/2022         RE: Holly Muir  [id number] [facility] [id number]        Dear Colleague,    Thank you for referring your patient, Holly Muir, to the [facility] [place]. Please see a copy of my visit note below.    HISTORY OF PRESENT ILLNESS  Ms. Muir is a pleasant 67 year old female following up with right knee pain.  Holly unfortunately is continuing to experience severe right knee pain, this was bad enough to bring her to the emergency department recently.  She had a reassuring knee x-ray, she was given ibuprofen.  Unfortunately this has not helped her.  She is walking with the assistance of a cane.     PHYSICAL EXAM  General  - normal appearance, in no obvious distress  Musculoskeletal - right knee  - stance: mildly antalgic gait  - inspection: trace effusion  - palpation: medial joint line tenderness  - ROM: 120 degrees flexion, 0 degrees extension, painful active ROM  - strength: 5/5 in flexion, 5/5 in extension  - special tests:  (-) Mahin  (-) varus at 0 and 30 degrees flexion  (-) valgus at 0 and 30 degrees flexion  Neuro  - no sensory or motor deficit, grossly normal coordination, normal muscle tone       ASSESSMENT & PLAN  Ms. Muir is a 67 year old female following up with right knee pain.    I reviewed the documentation from her emergency department visit.  We also reviewed her x-ray results from that visit which do reveal osteoarthritis throughout her knee.    We revisited options for her knee ranging from conservative treatment to the role of surgical intervention.  She is interested in discussing surgical options, I am referring her to Dr. Sheehan to have this discussion.  I also did prescribe her prednisone for the meantime, as her symptoms are quite severe.  Lastly, we did give her a knee brace today to use for ambulation.    It was a pleasure seeing Holly.        Santy Harrington, , CAQSM      This note was constructed using  Dragon dictation software, please excuse any minor errors in spelling, grammar, or syntax.        Again, thank you for allowing me to participate in the care of your patient.        Sincerely,        Santy Harrington, DO

## 2022-12-08 NOTE — TELEPHONE ENCOUNTER
Forms/Letter Request    Type of form/letter: letter and OV notes    Have you been seen for this request: Yes     Do we have the form/letter: No    Pt's colostemy supplies are getting denied by her insurance company. They are needing a letter of necessity as well as OV notes and other additional information. Finley from  Medical supply company will fax over what is needed along with the denial letter. Forms encounter can be opened once we receive forms in clinic.  Maria T Elias CMA

## 2022-12-08 NOTE — PROGRESS NOTES
HISTORY OF PRESENT ILLNESS  Ms. Muir is a pleasant 67 year old female following up with right knee pain.  Holly unfortunately is continuing to experience severe right knee pain, this was bad enough to bring her to the emergency department recently.  She had a reassuring knee x-ray, she was given ibuprofen.  Unfortunately this has not helped her.  She is walking with the assistance of a cane.     PHYSICAL EXAM  General  - normal appearance, in no obvious distress  Musculoskeletal - right knee  - stance: mildly antalgic gait  - inspection: trace effusion  - palpation: medial joint line tenderness  - ROM: 120 degrees flexion, 0 degrees extension, painful active ROM  - strength: 5/5 in flexion, 5/5 in extension  - special tests:  (-) Mahin  (-) varus at 0 and 30 degrees flexion  (-) valgus at 0 and 30 degrees flexion  Neuro  - no sensory or motor deficit, grossly normal coordination, normal muscle tone       ASSESSMENT & PLAN  Ms. Muir is a 67 year old female following up with right knee pain.    I reviewed the documentation from her emergency department visit.  We also reviewed her x-ray results from that visit which do reveal osteoarthritis throughout her knee.    We revisited options for her knee ranging from conservative treatment to the role of surgical intervention.  She is interested in discussing surgical options, I am referring her to Dr. Sheehan to have this discussion.  I also did prescribe her prednisone for the meantime, as her symptoms are quite severe.  Lastly, we did give her a knee brace today to use for ambulation.    It was a pleasure seeing Holly.        Santy Harrington, DO, CAQSM      This note was constructed using Dragon dictation software, please excuse any minor errors in spelling, grammar, or syntax.

## 2022-12-08 NOTE — NURSING NOTE
Chief Complaint   Patient presents with     Right Knee - Pain, Follow Up       There were no vitals filed for this visit.    There is no height or weight on file to calculate BMI.      LYNDSAY Maya NREMT

## 2022-12-09 ENCOUNTER — TELEPHONE (OUTPATIENT)
Dept: FAMILY MEDICINE | Facility: CLINIC | Age: 67
End: 2022-12-09

## 2022-12-09 ENCOUNTER — MEDICAL CORRESPONDENCE (OUTPATIENT)
Dept: HEALTH INFORMATION MANAGEMENT | Facility: CLINIC | Age: 67
End: 2022-12-09

## 2022-12-12 ENCOUNTER — TELEPHONE (OUTPATIENT)
Dept: FAMILY MEDICINE | Facility: CLINIC | Age: 67
End: 2022-12-12

## 2022-12-12 NOTE — TELEPHONE ENCOUNTER
Patient is calling requesting PA for her colonoscopy bags.    Alicia Stewart RN  [facility]

## 2022-12-12 NOTE — TELEPHONE ENCOUNTER
Forms/Letter Request    Type of form/letter: relieve medical supply, ostomy supplies    Have you been seen for this request: N/A    Do we have the form/letter: Yes: faxed to us    When is form/letter needed by: asap    How would you like the form/letter returned: fax to [phone]

## 2022-12-12 NOTE — TELEPHONE ENCOUNTER
DIAGNOSIS: Bilateral knee pain   APPOINTMENT DATE: 01/03/2023   NOTES STATUS DETAILS   OFFICE NOTE from referring provider Internal 12/08/2022 Dr Harrington [facility]    OFFICE NOTE from other specialist N/A 11/10/2022 Dr Harrington [facility]    DISCHARGE SUMMARY from hospital N/A    DISCHARGE REPORT from the ER Care Everywhere 11/30/2022 [facility]    OPERATIVE REPORT N/A    MEDICATION LIST N/A    EMG (for Spine) N/A    IMPLANT RECORD/STICKER N/A    LABS     CBC/DIFF N/A    CULTURES N/A    INJECTIONS DONE IN RADIOLOGY N/A    MRI N/A    CT SCAN N/A    XRAYS (IMAGES & REPORTS) Received 11/30/2022 RT knee  04/17/2022 LFT knee   TUMOR     PATHOLOGY  Slides & report N/A      Images in PACS

## 2022-12-16 DIAGNOSIS — S92.352A CLOSED FRACTURE OF FIFTH METATARSAL BONE OF LEFT FOOT, PHYSEAL INVOLVEMENT UNSPECIFIED, INITIAL ENCOUNTER: ICD-10-CM

## 2022-12-16 DIAGNOSIS — M25.561 ARTHRALGIA OF BOTH KNEES: ICD-10-CM

## 2022-12-16 DIAGNOSIS — M25.562 ARTHRALGIA OF BOTH KNEES: ICD-10-CM

## 2022-12-16 DIAGNOSIS — E78.00 HIGH CHOLESTEROL: ICD-10-CM

## 2022-12-16 DIAGNOSIS — G25.81 RESTLESS LEG SYNDROME: ICD-10-CM

## 2022-12-16 RX ORDER — PSEUDOEPHED/ACETAMINOPH/DIPHEN 30MG-500MG
TABLET ORAL
Qty: 180 TABLET | Refills: 0 | OUTPATIENT
Start: 2022-12-16

## 2022-12-16 RX ORDER — GABAPENTIN 600 MG/1
TABLET ORAL
Qty: 180 TABLET | Refills: 5 | Status: SHIPPED | OUTPATIENT
Start: 2022-12-16 | End: 2023-09-20

## 2022-12-16 RX ORDER — SIMVASTATIN 20 MG
TABLET ORAL
Qty: 90 TABLET | Refills: 0 | Status: SHIPPED | OUTPATIENT
Start: 2022-12-16 | End: 2023-03-22

## 2022-12-19 NOTE — PROGRESS NOTES
[facility] Care Coordination Contact  CC received notification of Emergency Room visit.  ER visit occurred on 11/30/2022 at [facility]  with Dx of Knee pain .    CC contacted member and reviewed discharge summary.  Member has a follow-up appointment with PCP: No: Offered Assistance with setting up a follow up appointment  Member has had a change in condition: No  New referrals placed: No  Home Visit Needed: No  Care plan reviewed and updated.  PCP notified of ED visit via EMR.  Divya Smalls RN,BSN  [facility] Case Coordinator   [phone]

## 2022-12-20 DIAGNOSIS — E11.40 TYPE 2 DIABETES MELLITUS WITH DIABETIC NEUROPATHY (H): Primary | ICD-10-CM

## 2022-12-20 RX ORDER — INSULIN GLARGINE 100 [IU]/ML
45 INJECTION, SOLUTION SUBCUTANEOUS AT BEDTIME
Qty: 45 ML | Refills: 1 | Status: SHIPPED | OUTPATIENT
Start: 2022-12-20 | End: 2023-02-01

## 2022-12-20 NOTE — TELEPHONE ENCOUNTER
Basaglar not covered-requesting Lantus script.    Pended.    Brandie Mcgarry on 12/20/2022 at 10:37 AM

## 2022-12-28 ENCOUNTER — TELEPHONE (OUTPATIENT)
Dept: PHARMACY | Facility: CLINIC | Age: 67
End: 2022-12-28

## 2022-12-28 ENCOUNTER — VIRTUAL VISIT (OUTPATIENT)
Dept: PHARMACY | Facility: CLINIC | Age: 67
End: 2022-12-28
Payer: COMMERCIAL

## 2022-12-28 DIAGNOSIS — Z79.4 TYPE 2 DIABETES MELLITUS WITH HYPERGLYCEMIA, WITH LONG-TERM CURRENT USE OF INSULIN (H): Primary | ICD-10-CM

## 2022-12-28 DIAGNOSIS — E78.5 HYPERLIPIDEMIA LDL GOAL <100: ICD-10-CM

## 2022-12-28 DIAGNOSIS — F25.9 SCHIZOAFFECTIVE DISORDER, UNSPECIFIED TYPE (H): ICD-10-CM

## 2022-12-28 DIAGNOSIS — E11.65 TYPE 2 DIABETES MELLITUS WITH HYPERGLYCEMIA, WITH LONG-TERM CURRENT USE OF INSULIN (H): Primary | ICD-10-CM

## 2022-12-28 DIAGNOSIS — I10 BENIGN ESSENTIAL HYPERTENSION: ICD-10-CM

## 2022-12-28 DIAGNOSIS — R25.1 TREMOR: ICD-10-CM

## 2022-12-28 DIAGNOSIS — G47.00 INSOMNIA, UNSPECIFIED TYPE: ICD-10-CM

## 2022-12-28 DIAGNOSIS — G89.29 CHRONIC BILATERAL LOW BACK PAIN WITHOUT SCIATICA: ICD-10-CM

## 2022-12-28 DIAGNOSIS — F41.9 ANXIETY: ICD-10-CM

## 2022-12-28 DIAGNOSIS — M54.50 CHRONIC BILATERAL LOW BACK PAIN WITHOUT SCIATICA: ICD-10-CM

## 2022-12-28 DIAGNOSIS — G25.81 RESTLESS LEG SYNDROME: ICD-10-CM

## 2022-12-28 DIAGNOSIS — J30.1 SEASONAL ALLERGIC RHINITIS DUE TO POLLEN: ICD-10-CM

## 2022-12-28 DIAGNOSIS — Z78.9 TAKES DIETARY SUPPLEMENTS: ICD-10-CM

## 2022-12-28 DIAGNOSIS — R21 RASH: ICD-10-CM

## 2022-12-28 DIAGNOSIS — K21.9 GASTROESOPHAGEAL REFLUX DISEASE WITHOUT ESOPHAGITIS: ICD-10-CM

## 2022-12-28 PROCEDURE — 99605 MTMS BY PHARM NP 15 MIN: CPT | Performed by: PHARMACIST

## 2022-12-28 PROCEDURE — 99607 MTMS BY PHARM ADDL 15 MIN: CPT | Performed by: PHARMACIST

## 2022-12-28 RX ORDER — IBUPROFEN 200 MG
200 TABLET ORAL PRN
COMMUNITY
End: 2023-05-18

## 2022-12-28 NOTE — LETTER
"Recommended To-Do List      Prepared on: Dec 28, 2022       You can get the best results from your medications by completing the items on this \"To-Do List.\"      Bring your To-Do List when you go to your doctor. And, share it with your family or caregivers.    My To-Do List:  What we talked about: What I should do:   A new medication that may be of benefit to you to help with your acid reflux/burping.    Start taking Tums          What we talked about: What I should do:   Pricking your finger to test your blood sugars has been difficult for you    I have contacted your endocrinologist to send in a prescription for a FreeStyle Mathieu continuous glucose monitor to make testing your blood sugars easier.          What we talked about: What I should do:   Metformin may be causing diarrhea.    I contacted your endocrinologist to see if we can change your metformin to extended release formulation to see if this helps with the diarrhea.          What we talked about: What I should do:   You have naloxone (Narcan) on hand at home to be available in the event of an overdose with tramadol    We discussed how to use naloxone and the importance of your daughters knowing how to use it. I sent over a second prescription to your pharmacy for you to have available at your other daughter's house.          What we talked about: What I should do:   Be cautious with taking more tramadol than prescribed, especially in combination with gabapentin, as this can cause difficulty breathing or an overdose.    Decrease how often you take traMADol (ULTRAM) to only up to 1 tablet twice daily as needed for severe pain.          What we talked about: What I should do:   A new medication that may be of benefit to you to help with sleep    Start taking melatonin 1 - 3 mg nightly.          What we talked about: What I should do:                       "

## 2022-12-28 NOTE — PROGRESS NOTES
Holly is a 67 year old who is being evaluated via a billable telephone visit.      What phone number would you like to be contacted at? [phone]    How would you like to obtain your AVS? MyChart     Outcome for 12/28/22 8:45 AM: Mychart message sent  Alejandra Duvall, VF  Outcome for 01/02/23 12:16 PM: Left Voicemail   Malathi Mccarty, VF  Outcome for 01/03/23 10:25 AM: Left Voicemail   Alejandra Duvall, VF     Video  Start 8:15 am  End: 8:35 am  Devan                                                            - Endocrinology Follow up -    Reason for visit/consult:  Uncontrolled type 2 diabetes mellitus with hyperglycemia, with long-term current use of insulin (H)    Primary care provider: Tiffany King      Assessment and Plan    # DM2, night time eating habits  A1c 8.9, improved from 10.5 over 1 month, by reviewing glucometer average glucose 156 past 1 month.  She mentioned occasionally lower side of glucose am, recent glucose 130-150s, however occasional hypoglycemia during the day 70s and morning    - Further reduce Basaglar from 45 to 40 units night    -Continue current metformin 1000 mg bid (she has diarrehea episodes, discussed with her and daughter, offered to stop or reduce, however thye want to continue and watch for now)     - Continue current Actos 45 mg, body weight stable.     - Glimepiride reduce from 4 mg daily to 2 mg daily    - lab prior to next visit      # DM education  Her daughter wants to know more about diabetic foods.   They will make appt with Mikayla.     # Mental health/Depression      # DM device  Newly prescribed Libre2, she has hand tremors and difficult for check with glucometer    -RTC with me or any DM PA  in 6 months         30  minutes spent on the date of the encounter doing chart review, history and exam, documentation and further activities as noted above.    Elizabeth Medrano MD  Staff Physician  Endocrinology and Metabolism  [facility]  License: [place]  [id number]  Pager: [phone]      Current Diabetic Regimens:  Basaglar 40 units night (reduce from 45 to 40 in 1/2023)  NovoLog 6-12 units each meal (hold now as of 5/2022 due to compliance/depression)  Metformin 1000 twice daily (diarrhea)  Actos 45 mg daily  Glimepiride 2 mg daily (reduce from 4 mg to 2 mg)    Life Style:  Wake up 10-11 am  Breakfast after  Lunch 1-2 pm: sandwich  Dinner 6 pm: mexican fat foods  Bedtime: 9 pm  3 am : wake up and snacks    Exercise:  walking    DM complications:  Retinopathy: due not been for 1-2 years.   Nephropathy: urine microalbumin negative 8/2018 - due  Neuropathy:   Most recent LDL:   LDL Cholesterol Calculated   Date Value Ref Range Status   10/08/2021 39 <=100 mg/dL Final   06/18/2020 26 <100 mg/dL Final     Comment:     Desirable:       <100 mg/dl         Interval History as of 1/4/2023 : Patient has been doing well and feeling better. Medication compliance  Improving. She mentioned occasionally lower side of glucose am, recent glucose 130-150s, however occasional hypoglycemia during the day 70s and morning.  Interval History as of 5/13/2022 : Patient has been doing ok, depression fluctuates. She forgets insulin.   Interval History as of 11/12/2021 : Patient has been doing better, back to compliant to medication . New event includes : A1c increased 10.5 while she was in [place] and not better .  Interval History as of 12/11/2020 : Patient has been doing well.A1c 7.9 today (12/11/2020), her glucose improved significantly, came with her daughter today and she is watching mother's diet.   Interval History as of 6/5/2020 : Fluctuating glucose, lots fast foods nowadays. Medication compliance good .  Interval History as of 9/20/2019 : Follow-up in 4 months, compliant medications.  However she still have nighttime snacking habit every night, when she eat carbohydrate snacks glucose in the morning was 250s.  Light fruits 160-180s.  Patient mentioned depression was worse spring  2019 but currently relatively okay and she has psychiatrist to follow-up.   Interval History as of 5/2019: Patient came by herself she missed appointment, increasing the dose of insulin, mentioned started to have a glucose increase 300 in the fasting in the morning her mental issue.  Seen by new psychiatrist. Visitign nurse once a week.    HPI: A 63 yo female second follow up DM2, she has remote history of colectomy. This is the third visit.   Used to use V go for several month, she was confused the system, worsened A1C, then we switch back to insulin injection regimens.    Currently lantus 38 utnis and novology 6-8-8 and sliding scale and metformin 1000 bid.   Last visit prscribed actos 30 mg daily, however she mentioned, she is not sure which one is actos and she may not taking actos.      Of note,  she has had sleeping issues, which distracts her life significantly. She cannot sleep at night, usually sleeps in the day, during the night, she has been hungry and eating peanuts butters, jelly, fruits etc.   Sometimes she cannot sleep for 2 days.   She has visiting nurse once a week, yesterday the nurse called our clinic that her glucose was 500. Talked with Mikayla, lantus was increased from 38 to 42 units.     Glucose: she forgot to bring glucometer today.     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     Past Surgical History:   Procedure Laterality Date     ARTHROSCOPY KNEE RT/LT      LT     BLEPHAROPLASTY BILATERAL Bilateral 8/16/2019    Procedure: BILATERAL UPPER LID  BLEPHAROPLASTY;  Surgeon: Randolph Cook MD;  Location: [facility] OR     BREAST LUMPECTOMY, RT/LT      LT-benign      CATARACT IOL, RT/LT       COLONOSCOPY  5/10/2012    Procedure:COLONOSCOPY; screening colonoscopy; Surgeon:MILLA VEGA; Location:[facility] OR     COLONOSCOPY WITH CO2 INSUFFLATION N/A 8/2/2019    Procedure: Colonoscopy with CO2 insufflation;  Surgeon: Himanshu Hi MD;  Location: [facility] OR     COLOSTOMY  2000     COLOSTOMY       HYSTERECTOMY, PAP NO LONGER INDICATED      done in [place]-cervical cancer     IMPLANT STIMULATOR SACRAL NERVE STAGE ONE Right 3/10/2015    Procedure: IMPLANT STIMULATOR SACRAL NERVE STAGE ONE;  Surgeon: Teodora Yusuf MD;  Location: [facility] OR     IMPLANT STIMULATOR SACRAL NERVE STAGE TWO Right 3/31/2015    Procedure: IMPLANT STIMULATOR SACRAL NERVE STAGE TWO;  Surgeon: Teodora Yusuf MD;  Location: [facility] OR     IMPLANT STIMULATOR SACRAL NERVE STAGE TWO Right 6/22/2020    Procedure: INSERTION, SACRAL NERVE STIMULATOR, STAGE 2 (replacement of interstim battery);  Surgeon: Teodora Yusuf MD;  Location: [facility] OR     PHACOEMULSIFICATION WITH STANDARD INTRAOCULAR LENS IMPLANT Left 9/26/2019    Procedure: LEFT PHACOEMULSIFICATION, CATARACT, WITH STANDARD IOL INSERTION;  Surgeon: Francesco Winn MD;  Location: [facility] OR     PHACOEMULSIFICATION WITH STANDARD INTRAOCULAR LENS IMPLANT Right 10/24/2019    Procedure: RIGHT PHACOEMULSIFICATION, CATARACT, WITH STANDARD IOL INSERTION;  Surgeon: Francesco Winn MD;  Location: [facility] OR     REPAIR PTOSIS Bilateral 08/16/2019     SALPINGO OOPHORECTOMY,R/L/JENNIFER      Salpingo Oophorectomy, RT/LT/JENNIFER     ZZC EXCIS PTERYGIUM  10/08    [facility]     [facility] STOMACH SURGERY PROCEDURE UNLISTED       ZZ COLONOSCOPY THRU STOMA, DIAGNOSTIC  2002    normal per report-no records available-records destroyed       Allergies:  Allergies   Allergen Reactions     Morphine Sulfate Itching       Current Medications   Current Outpatient  Medications   Medication     ACETAMINOPHEN EXTRA STRENGTH 500 MG tablet     ARIPiprazole (ABILIFY) 15 MG tablet     blood glucose (ACCU-CHEK CELIA PLUS) test strip     blood glucose (NO BRAND SPECIFIED) test strip     blood glucose monitoring (ACCU-CHEK CELIA PLUS) meter device kit     blood glucose monitoring (SOFTCLIX) lancets     buPROPion (WELLBUTRIN XL) 150 MG 24 hr tablet     busPIRone HCl (BUSPAR) 30 MG tablet     celecoxib (CELEBREX) 100 MG capsule     cetirizine (ZYRTEC) 10 MG tablet     Continuous Blood Gluc Sensor (FREESTYLE CM 14 DAY SENSOR) [facility] ASPIRIN ADULT LOW DOSE 81 MG chewable tablet     ferrous gluconate (FERGON) 324 (38 Fe) MG tablet     gabapentin (NEURONTIN) 600 MG tablet     glimepiride (AMARYL) 4 MG tablet     ibuprofen (ADVIL/MOTRIN) 200 MG tablet     insulin glargine (LANTUS SOLOSTAR) 100 UNIT/ML pen     insulin pen needle (31G X 5 MM) 31G X 5 MM miscellaneous     Lancets (ONETOUCH DELICA PLUS [id number]) MISC     metFORMIN (GLUCOPHAGE XR) 500 MG 24 hr tablet     metFORMIN (GLUCOPHAGE) 1000 MG tablet     Multiple Vitamin (DAILY-ISAAC MULTIVITAMIN) TABS     naloxone (NARCAN) 4 MG/0.1ML nasal spray     nystatin (MYCOSTATIN) 167863 UNIT/GM external powder     omeprazole (PRILOSEC) 20 MG DR capsule     order for DME     order for DME     order for DME     order for DME     ORDER FOR DME     ORDER FOR DME     PARoxetine (PAXIL) 20 MG tablet     pioglitazone (ACTOS) 45 MG tablet     propranolol ER (INDERAL LA) 80 MG 24 hr capsule     ramipril (ALTACE) 5 MG capsule     rOPINIRole (REQUIP) 0.25 MG tablet     simvastatin (ZOCOR) 20 MG tablet     traMADol (ULTRAM) 50 MG tablet     traZODone (DESYREL) 50 MG tablet     UNIVERSAL REMOVER WIPES EX MISC     vitamin C ([employer] VITAMIN C) 500 MG tablet     VITAMIN D3 50 MCG (2000 UT) tablet     Current Facility-Administered Medications   Medication     lidocaine 1 % injection 2 mL     triamcinolone (KENALOG-40) injection 40 mg     triamcinolone  (KENALOG-40) injection 40 mg     triamcinolone (KENALOG-40) injection 40 mg       Family History:  Family History   Problem Relation Age of Onset     Diabetes Mother      Cerebrovascular Disease Mother      Diabetes Father      Hypertension Father      Cancer Maternal Grandfather      Cancer Paternal Grandfather      Diabetes Brother      Diabetes Sister      Thyroid Disease Daughter      Thyroid Disease Sister      Multiple Sclerosis Daughter      Glaucoma No family hx of      Macular Degeneration No family hx of        Social History:  Social History     Tobacco Use     Smoking status: Former     Packs/day: 0.00     Types: Cigarettes     Start date: 1974     Quit date: 1975     Years since quittin.4     Smokeless tobacco: Never   Substance Use Topics     Alcohol use: Yes     Alcohol/week: 0.0 standard drinks     Comment: social occasions   taking care of 11 and 18 year grand children and live togheter.     ROS:  Full review of systems taken with the help of the intake sheet. Otherwise a complete 14 point review of systems was taken and is negative unless stated in the history above.    Physical Exam:   not currently breastfeeding.  General: well appearing, no acute distress, pleasant and conversant,   Mental Status/neuro: alert and oriented  Face: symmetrical, normal facial color  Eyes: anicteric, no proptosis or lid lag  Resp: no acute distress        Labs (General):      Ref. Range 2020 14:23 2020 00:00 2021 09:32 10/8/2021 09:22 2021 00:00   Hemoglobin A1C Latest Ref Range: 0.0 - 5.7 % 10.6 (H) 7.9 (A) 7.7 (H) 10.5 (H) 8.9 (A)

## 2022-12-28 NOTE — PROGRESS NOTES
Medication Therapy Management (MTM) Encounter    ASSESSMENT:                            Medication Adherence/Access: No issues identified    Type 2 Diabetes: Patient is not meeting A1c goal of < 8%. Self monitoring of blood glucose is at goal of fasting  mg/dL, but post prandial not at goal of <180 mg/dL. Blood sugar readings continuing to improve. Patient to continue to focus on improving diet and continue following with endocrinology and diabetes education. Insulin and SFU can contribute to weight gain. If hypoglycemia continues, could consider dose reduction of SFU. Patient may benefit from starting a GLP-1 or SGLT-2, pending insurance coverage. GLP-1 may be beneficial given the cardioprotective and weight loss benefits. SGLT-2 may be beneficial given potential for some weight loss, cardioprotection and renal protection. Patient may benefit from switching metformin to XR to help with diarrhea. She may also benefit from a CGM due to tremor, pending insurance coverage. In meantime, needs glucose testing supplies that is appropriate for current glucose meter. Due for annual foot exam, which was not addressed today due to time. Patient is due for A1c to be done at next appt with Dr. King.    Hyperlipidemia: LDL Stable. Patient is on moderate intensity statin which is indicated based on 2019 ACC/AHA guidelines for lipid management and 2022 ADA Guidelines.    Mental Health: May need to contact psychiatry to confirm current medication doses. Patient to continue following with psychiatry and therapy.    Insomnia: Could consider melatonin in future if having difficulty with sleeping.     Restless Leg Syndrome: Could consider dose increase of ropinirole in future if needed. Holly and Cata would prefer to hold off on additional medication changes at this time. Low ferritin could be contributing, but didn't have time to address today.     Pain: Continue following with ortho. Physical therapy may be beneficial in future  along with weight management to help with pain. Concurrent tramadol and gabapentin use increases risk of respiratory depression. Provided Narcan education to Holly today. May benefit from having additional Narcan on hand.     Tremor: Given ongoing tremor symptoms, could consider dose increase of propranolol pending stable heart rate. Peter would like to hold off on additional medication changes at this time.    Hypertension: Stable. Patient is meeting blood pressure goal of <140/90 and more stringent goal of < 130/80mmHg.    Allergies: Stable.    Rash: Stable.    GERD: Current treatment is not effective and could consider using TUMS as needed. Discussed limiting spicy and greasy foods. Chronic PPI use places patient at an increased risk of C. Diff, hypomagnesemia and lower bone mineral density, these risks were not reviewed with the patient due to time.     Supplements: Hgb < 12 g/dL and may benefit from lab recheck in future. Could also consider vitamin D level. Did not have time to address today.    PLAN:                            1. Per phone call with nurse at [facility], you should be taking aripiprazole 15 mg nightly and paroxetine 40 mg daily. So no changes need to be made to your medication doses. I would recommend you continue to follow-up with your psychiatrist and therapist for management of your mental health.    2. Sent refill of Narcan to [facility] in [place].     3. Schedule a follow up appointment with Dr. King. You should get your routine labs done at this time.    4. Sent message to endocrinology team regarding Accu-chek Verónica Plus testing supplies, FreeStyle stan continuous glucose meter, and switching metformin to the extended-release formulation. If metformin is switched to the extended release formulation, then you will need to take 2 tablets (500 mg) by mouth twice daily.    5. Consider melatonin 1-3 mg nightly to help with sleep.    6. Consider using TUMS to help  with your acid reflux.     Follow-up: warm [facility] to [facility] pharmacist[facility]    SUBJECTIVE/OBJECTIVE:                          Holly Muir is a 67 year old female called for an initial visit. She was referred to me from insurance, [facility]. Patient was accompanied by daughter, Cata.     Reason for visit: comprehensive medication review.    Allergies/ADRs: Reviewed in chart  Past Medical History: Reviewed in chart  Tobacco: She reports that she quit smoking about 47 years ago. Her smoking use included cigarettes. She started smoking about 48 years ago. She has never used smokeless tobacco.  Alcohol: not currently using    Medication Adherence/Access: Cata, daughter manages her medications and fills her pillbox. No noted concerns today about medication access.     Type 2 Diabetes:  Currently taking Lantus 45 units nightly (recently switched from Basaglar), glimepiride 4 mg daily, pioglitazone 45 mg daily, and metformin 1000 mg twice daily. Patient is experiencing some diarrhea. Cata reports Novolog was discontinued due to poor compliance.  Patient is following with endocrinology and diabetes education.  Blood sugar monitorin-6 times per day. Ranges (patient reported): 300 - 500 mg/dL previously, now numbers 100 - low 200s mg/dL. Cata is helping Holly to monitor sugars more closely than previous. She reports her having some lows in the evening. Holly has a difficult time with fingerstick glucometer due to her tremor and Cata wonders about CGM. Cata also reports that Holly's current strips don't work with her meter. She requests Accu-chek Verónica Plus test strips and lancing supplies.  Symptoms of high blood sugar? none  Eye exam: up to date  Foot exam: due  Diet/Exercise: Cata reports she was previously consuming high amount of carbs and sugar. Her daughter has helped her to make healthy diet changes. Her diet commonly includes oatmeal (with pumpkin and chi seeds), boiled eggs, fruit, popcorn, chicken,  pork chops. Working on portion control and including more vegetables. Since making healthy changes, she has lost weight which has helped with her mobility.  Aspirin: Taking 81 mg daily and denies side effects.   Statin: Yes: simvastatin 20 mg daily   ACEi/ARB: Yes: ramipril 5 mg daily.   Urine Albumin:   Lab Results   Component Value Date    UMALCR 50.00 (H) 10/08/2021      Lab Results   Component Value Date    A1C 8.5 07/14/2022    A1C 10.1 04/22/2022    A1C 8.9 11/12/2021    A1C 10.5 10/08/2021    A1C 7.7 01/06/2021    A1C 7.9 12/11/2020    A1C 10.6 08/24/2020    A1C 9.5 06/18/2020     GFR Estimate   Date Value Ref Range Status   04/22/2022 81 >60 mL/min/1.73m2 Final     Comment:     Effective December 21, 2021 eGFRcr in adults is calculated using the 2021 CKD-EPI creatinine equation which includes age and gender (Isaac et al., [facility], DOI: 10.1056/HNLQic8654223)   08/24/2020 >90 >60 mL/min/[1.73_m2] Final     Comment:     Non  GFR Calc  Starting 12/18/2018, serum creatinine based estimated GFR (eGFR) will be   calculated using the Chronic Kidney Disease Epidemiology Collaboration   (CKD-EPI) equation.       Hyperlipidemia: Current therapy includes simvastatin 20 mg daily.  Patient reports no significant myalgias or other side effects.  Recent Labs   Lab Test 10/08/21  0922 06/18/20  1403 12/19/16  0756 09/18/15  0944   CHOL 134 121   < > 125   HDL 76 78   < > 62   LDL 39 26   < > 36   TRIG 96 87   < > 134   CHOLHDLRATIO  --   --   --  2.0    < > = values in this interval not displayed.     Mental Health: Patient is currently taking bupropion  mg daily (dose was previously reduced, per [place] reason of dose reduction was to reduce risk of serotonin syndrome), paroxetine 40 mg daily, buspirone 30 mg twice daily, and Abilify 15 mg at bedtime. She follows with psychiatrist Dr. Chou. Cata is concerned about Abilify dose because nurse was giving 20 mg + 15 mg and she hasn't heard back from  psychiatry about correct dose so has been giving her only 15 mg daily. Holly sees Dr. Chou every 3 months and follows with a therapist. Holly reports feeling depressed about 3 times in last month. She feels the recent passing of her father may be contributing to this.  PHQ 4/21/2022 5/24/2022 7/14/2022   PHQ-9 Total Score 14 15 2   Q9: Thoughts of better off dead/self-harm past 2 weeks Not at all Not at all Not at all   PHQ-9 External Data - - -     ISABELA-7 SCORE 7/29/2019 3/31/2021 5/24/2022   Total Score - - -   Total Score 0 12 17     Insomnia: Current medications include: trazodone 75 mg nightly. Patient noted SE. Reports some nights having trouble with falling asleep.     Restless Leg Syndrome: Patient is currently ropinirole 0.25 mg nightly and gabapentin 1200 mg three times daily. No reported SE. She is stall having frequent restless leg syndrome symptoms.     Pain: Takes acetaminophen 500 mg - 2 tablets three times daily, tramadol 50 mg up to twice daily as needed (sometimes takes additional dose for severe pain), ibuprofen 200 mg as needed, celecoxib 100 mg twice daily, and gabapentin 1200 mg three times daily (see above). She also receives steroid injections in knees, which she has found helpful. She reports always being in pain. Pain located in legs, knees, and back. She notes that walking exacerbates her back pain so she uses a mobile wheelchair or cane to get around.  She is following with ortho and will be starting physical therapy in January. Cata reports she was previously not monitoring Holly's tramadol use closely, but noticed she was taking more frequently than prescribed, so is managing more closely now. Holly has Narcan on hand but doesn't know what it's for. Cata requests second Rx of Narcan to have on hand between the two locations that Holly resides.    Tremor: She is currently taking propranolol ER 80 mg daily. Still having tremors. No reported SE.     Hypertension: Current medications  include ramipril 5 mg daily.  Patient does not self-monitor blood pressure.  Patient reports no current medication side effects.  BP Readings from Last 3 Encounters:   01/02/23 118/74   11/19/22 110/71   09/14/22 130/80     Allergies: Patient is taking cetirizine 10 mg daily. Finds helpful, no noted issues.    Rash: Patient uses nystatin powder up to 3 times daily for a rash from colostimy bag. Finds helpful. No issues reported.    GERD: Current medications include: Prilosec (omeprazole) 40 mg once daily. The patient does notice symptoms if they miss a dose. Patient feels that current regimen is effective, but Cata notes some burping caused by acid reflux.    Supplements: Currently taking vitamin C 250 mg daily (for iron absorption), ferrous gluconate 324 mg daily, MVI daily, and vitamin D3 2000 units daily. No reported issues at this time.  Vitamin D Deficiency Screening Results:  No results found for: VITDT    Lab Results   Component Value Date    WBC 7.4 04/22/2022    WBC 6.0 08/24/2020     Lab Results   Component Value Date    RBC 3.95 04/22/2022    RBC 4.16 08/24/2020     Lab Results   Component Value Date    HGB 11.3 04/22/2022    HGB 11.0 10/14/2020     Lab Results   Component Value Date    HCT 34.9 04/22/2022    HCT 34.9 08/24/2020     Lab Results   Component Value Date    MCV 88 04/22/2022    MCV 84 08/24/2020     Lab Results   Component Value Date    MCH 28.6 04/22/2022    MCH 26.9 08/24/2020     Lab Results   Component Value Date    MCHC 32.4 04/22/2022    MCHC 32.1 08/24/2020     Lab Results   Component Value Date    RDW 14.6 04/22/2022    RDW 15.1 08/24/2020     Lab Results   Component Value Date     04/22/2022     08/24/2020     Lab Results   Component Value Date    ENZO 40 10/14/2020     No results found for: FOLIC  ----------------  I spent 73 minutes with this patient today. I offer these suggestions for consideration by Dr. Medrano. Shamika refilled per CPA with Dr. King. A copy of the  visit note was provided to the patient's provider(s).    A summary of these recommendations was sent via [employer].    Rosana Fulton, PharmD   Medication Therapy Management   Cystic Fibrosis Pharmacist    Telemedicine Visit Details  Type of service:  Telephone visit  Start Time: 12:37 PM  End Time: 1:50 PM  Originating Location (pt. Location): Home      Distant Location (provider location):  Off-site  Provider has received verbal consent for a visit from the patient? Yes     Medication Therapy Recommendations  Bilateral low back pain without sciatica    Current Medication: naloxone (NARCAN) 4 MG/0.1ML nasal spray   Rationale: Does not understand instructions - Adherence - Adherence   Recommendation: Provide Education   Status: Patient Agreed - Adherence/Education          Current Medication: traMADol (ULTRAM) 50 MG tablet   Rationale: Medication interaction - Adverse medication event - Safety   Recommendation: Decrease Frequency   Status: Patient Agreed - Adherence/Education         GERD (gastroesophageal reflux disease)    Current Medication: omeprazole (PRILOSEC) 20 MG DR capsule   Rationale: Synergistic therapy - Needs additional medication therapy - Indication   Recommendation: Start Medication - Tums 500 MG Chew   Status: Patient Agreed - Adherence/Education         Insomnia, unspecified type    Current Medication: traZODone (DESYREL) 50 MG tablet   Rationale: Synergistic therapy - Needs additional medication therapy - Indication   Recommendation: Start Medication - melatonin 3 MG tablet   Status: Accepted - no CPA Needed         Type 2 diabetes mellitus with hyperglycemia, with long-term current use of insulin (H)    Current Medication: insulin glargine (LANTUS SOLOSTAR) 100 UNIT/ML pen   Rationale: Medication requires monitoring - Needs additional monitoring - Safety   Recommendation: Self-Monitoring - FreeStyle Mathieu 14 Day Sensor Misc   Status: Accepted per Provider          Current Medication: metFORMIN  (GLUCOPHAGE) 1000 MG tablet   Rationale: Undesirable effect - Adverse medication event - Safety   Recommendation: Change Medication - metFORMIN 500 MG 24 hr tablet   Status: Accepted per Provider

## 2022-12-28 NOTE — TELEPHONE ENCOUNTER
MTM referral from: Patient's insurance     MTM referral outreach attempt #1 on December 28, 2022      Outcome: completed today     Rosana Fulton, PharmD   Medication Therapy Management

## 2022-12-28 NOTE — LETTER
December 29, 2022  Holly Muir  [id number] [address] [id number]    Dear QUIN[facility]     Thank you for talking with me on Dec 28, 2022 about your health and medications. As a follow-up to our conversation, I have included two documents:      1. Your Recommended To-Do List has steps you should take to get the best results from your medications.  2. Your Medication List will help you keep track of your medications and how to take them.    If you want to talk about these documents, please call Rosana Fulton RPH at phone: [phone], Monday-Friday 8-4:30pm.    I look forward to working with you and your doctors to make sure your medications work well for you.    Sincerely,  Rosana Fulton RPH  [facility] Pharmacist, [employer]   Deep Sutures: 4-0 Monocryl

## 2022-12-28 NOTE — Clinical Note
Guerline King,  I met with Holly as recommended by Holly's insurance. Here is a summary of our visit.  Thank you, Rosana Fulton, PharmD  Medication Therapy Management

## 2022-12-28 NOTE — LETTER
_  Medication List        Prepared on: Dec 28, 2022     Bring your Medication List when you go to the doctor, hospital, or   emergency room. And, share it with your family or caregivers.     Note any changes to how you take your medications.  Cross out medications when you no longer use them.    Medication How I take it Why I use it Prescriber   ACETAMINOPHEN EXTRA STRENGTH 500 MG tablet TAKE 2 TABLETS BY MOUTH EVERY 8 HOURS. MAX ACETAMINOPHEN DOSE: 4000MG IN 24 HRS. Arthralgia of both knees Tiffany King MD PhD   ARIPiprazole (ABILIFY) 15 MG tablet Take 15 mg by mouth At Bedtime Schizoaffective disorder Dr. Chou   blood glucose (NO BRAND SPECIFIED) test strip Use to test blood sugar 4 times daily. Type 2 diabetes  Noy Lennon MD   blood glucose monitoring (ACCU-CHEK CELIA PLUS) meter device kit Use to test blood sugar 4 times daily. Type 2 diabetes  Patient Reported   buPROPion (WELLBUTRIN XL) 150 MG 24 hr tablet Take 150 mg by mouth daily Depression  Dr. Chou   busPIRone HCl (BUSPAR) 30 MG tablet TAKE 1 TABLET BY MOUTH TWICE A DAY Anxiety Tiffany King MD PhD   celecoxib (CELEBREX) 100 MG capsule TAKE 1 CAPSULE BY MOUTH TWICE A DAY Pain Tiffany King MD PhD   cetirizine (ZYRTEC) 10 MG tablet TAKE 1 TABLET BY MOUTH EVERY DAY Allergies  KASIE Silver CNP   [facility] ASPIRIN ADULT LOW DOSE 81 MG chewable tablet TAKE 1 TABLET BY MOUTH EVERY DAY Type 2 diabetes  Tiffany King MD PhD   ferrous gluconate (FERGON) 324 (38 Fe) MG tablet TAKE 1 TABLET (324 MG) BY MOUTH DAILY (WITH BREAKFAST) Iron deficiency anemia Tiffany King MD PhD   gabapentin (NEURONTIN) 600 MG tablet TAKE 2 TABLETS (1,200 MG) BY MOUTH 3 TIMES DAILY FOR 30 DAYS Arthralgia of both knees; Restless Leg Syndrome Tiffany King MD PhD   glimepiride (AMARYL) 4 MG tablet Take 1 tablet (4 mg) by mouth every morning (before breakfast) Type 2 diabetes  Elizabeth Medrano MD   ibuprofen (ADVIL/MOTRIN) 200 MG tablet Take 200 mg by mouth as needed for pain Pain  Patient  Reported   insulin glargine (LANTUS SOLOSTAR) 100 UNIT/ML pen Inject 45 Units Subcutaneous At Bedtime Type 2 diabetes  Elizabeth Medrano MD   insulin pen needle (31G X 5 MM) 31G X 5 MM miscellaneous Use four times 4 day times a day Type 2 Diabetes Mellitus, Uncontrolled (H) Elizabeth Medrano MD   Lancets (ONETOUCH DELICA PLUS [id number]) MISC Use to test blood sugars 4 times daily Type 2 diabetes  Noy Lennon MD   metFORMIN (GLUCOPHAGE) 1000 MG tablet Take 1 tablet (1,000 mg) by mouth 2 times daily (with meals) type 2 diabetes  Elizabeth Medrano MD   Multiple Vitamin (DAILY-ISAAC MULTIVITAMIN) TABS TAKE 1 TABLET BY MOUTH EVERY DAY General health Tiffany King MD PhD   naloxone (NARCAN) 4 MG/0.1ML nasal spray Spray 1 spray (4 mg) into one nostril alternating nostrils once as needed for opioid reversal every 2-3 minutes until assistance arrives Chronic Pain Syndrome Tiffany King MD PhD   nystatin (MYCOSTATIN) 040752 UNIT/GM external powder Apply 1 dose topically 3 times daily as needed Skin Rash Tiffany King MD PhD   omeprazole (PRILOSEC) 20 MG DR capsule TAKE 2 CAPSULES (40 MG) BY MOUTH DAILY *TAKE 30-60 MINUTES BEFORE A MEAL* Gastroesophageal Reflux Disease without Esophagitis Tiffany King MD PhD   PARoxetine (PAXIL) 20 MG tablet TAKE 2 TABLETS AT BEDTIME Severe recurrent major depressive  Dr. Chou   pioglitazone (ACTOS) 45 MG tablet Take 1 tablet (45 mg) by mouth daily Type 2 diabetes Elizabeth Medrano MD   propranolol ER (INDERAL LA) 80 MG 24 hr capsule TAKE 1 CAPSULE BY MOUTH EVERY DAY Tremor Tiffany King MD PhD   ramipril (ALTACE) 5 MG capsule TAKE 1 CAPSULE BY MOUTH EVERY DAY Blood pressure Tiffany King MD PhD   rOPINIRole (REQUIP) 0.25 MG tablet TAKE 1 TABLET (0.25 MG) BY MOUTH EVERY EVENING Restless Leg Syndrome Tiffany King MD PhD   simvastatin (ZOCOR) 20 MG tablet TAKE 1 TABLET BY MOUTH AT BEDTIME High Cholesterol Tiffany King MD PhD   traMADol (ULTRAM) 50 MG tablet Take 1 tablet (50 mg) by mouth 2 times daily as needed for severe  pain (7-10) osteoarthritis Tiffany King MD PhD   traZODone (DESYREL) 50 MG tablet Take 1.5 tablets (75 mg) by mouth At Bedtime Sleep  Tiffany King MD PhD   vitamin C ([facility]S VITAMIN C) 500 MG tablet 1/2 TABLET BY MOUTH JACOBSON WITH IRON SUPPLEMENT BEFORE MEAL Iron deficiency anemia Tiffany King MD PhD   VITAMIN D3 50 MCG (2000 UT) tablet TAKE 1 TABLET BY MOUTH EVERY DAY Severe recurrent major depressive and general health Tiffany King MD PhD         Add new medications, over-the-counter drugs, herbals, vitamins, or  minerals in the blank rows below.    Medication How I take it Why I use it Prescriber                          Allergies:      morphine sulfate        Side effects I have had:               Other Information:              My notes and questions:

## 2022-12-29 DIAGNOSIS — F11.20 CONTINUOUS OPIOID DEPENDENCE (H): ICD-10-CM

## 2022-12-29 DIAGNOSIS — M25.562 LEFT KNEE PAIN: Primary | ICD-10-CM

## 2022-12-29 DIAGNOSIS — E11.65 TYPE 2 DIABETES MELLITUS WITH HYPERGLYCEMIA, WITH LONG-TERM CURRENT USE OF INSULIN (H): Primary | ICD-10-CM

## 2022-12-29 DIAGNOSIS — G89.4 CHRONIC PAIN SYNDROME: ICD-10-CM

## 2022-12-29 DIAGNOSIS — Z79.4 TYPE 2 DIABETES MELLITUS WITH HYPERGLYCEMIA, WITH LONG-TERM CURRENT USE OF INSULIN (H): Primary | ICD-10-CM

## 2022-12-29 RX ORDER — BLOOD-GLUCOSE METER
EACH MISCELLANEOUS
COMMUNITY
End: 2023-05-21

## 2022-12-29 RX ORDER — METFORMIN HCL 500 MG
1000 TABLET, EXTENDED RELEASE 24 HR ORAL 2 TIMES DAILY WITH MEALS
Qty: 120 TABLET | Refills: 11 | Status: SHIPPED | OUTPATIENT
Start: 2022-12-29 | End: 2023-02-01

## 2022-12-29 RX ORDER — FLASH GLUCOSE SENSOR
KIT MISCELLANEOUS
Qty: 6 EACH | Refills: 3 | Status: SHIPPED | OUTPATIENT
Start: 2022-12-29 | End: 2023-05-25

## 2022-12-29 RX ORDER — BLOOD SUGAR DIAGNOSTIC
STRIP MISCELLANEOUS
Qty: 100 STRIP | Refills: 3 | Status: SHIPPED | OUTPATIENT
Start: 2022-12-29 | End: 2023-05-21

## 2022-12-29 RX ORDER — ARIPIPRAZOLE 15 MG/1
15 TABLET ORAL AT BEDTIME
Status: ON HOLD | COMMUNITY
End: 2023-10-02

## 2022-12-29 RX ORDER — LANCETS
EACH MISCELLANEOUS
Qty: 100 EACH | Refills: 3 | Status: SHIPPED | OUTPATIENT
Start: 2022-12-29 | End: 2023-05-25

## 2022-12-29 NOTE — PROGRESS NOTES
Assessment & Plan     1. Primary osteoarthritis of right knee    - Knee Supplies Order for DME - ONLY FOR DME  Patient is currently taking tramadol chronically for pain.  She is also currently taking Tylenol and NSAID as needed for pain.  Review of medical record showed that patient saw sport medicine 10 months ago for knee issue.  At the time, she was referred to orthopedic surgery for which she has an appointment with them tomorrow. I recommend the following:  -Follow up with orthopedic  -Continue to apply Ice 5-20 minutes at a time several times a day or as needed.  -rest, sitting  *-Activity modification - avoid impact activities or activities that aggravate symptoms.   Exercise: low impact such as stationary bike, elliptical, pool.  - Bracing: bracing the knee may offer some relief of symptoms when worn and provide some stability.  *-topical ointments may help as well  2. Encounter for administration of COVID-19 vaccine  - COVID-19,PF,MODERNA BIVALENT (18+YRS)    3. Chronic pain syndrome      4. Continuous opioid dependence (H)  Tramadol is prescribed by PCP    5. Visit for screening mammogram    - MA SCREENING DIGITAL BILAT - Future  (s+30); Future      Return in about 1 week (around 1/9/2023) for Follow up, in person knee pain.    Marcelle Hobbs MD  [facility] ANTONIO Barrera is a 67 year old, presenting for the following health issues:  Knee Pain      HPI     Pain History:  When did you first notice your pain? - Acute Pain   Have you seen anyone else for your pain? Yes - ED visit on 11/30/22  Where in your body do you have pain? Musculoskeletal problem/pain  Onset/Duration: 1 month  Description  Location: knee - right  Joint Swelling: YES  Redness: No  Pain: YES  Warmth: No  Intensity:  severe  Progression of Symptoms:  waxing and waning  Accompanying signs and symptoms:   Fevers: No  Numbness/tingling/weakness: YES  History  Trauma to the area: No  Recent illness:   "No  Previous similar problem: No  Previous evaluation:  YES  Precipitating or alleviating factors:  Aggravating factors include: standing, walking and climbing stairs  Therapies tried and outcome: None working. Pain persists with rest. No relief with tylenol or IBU  Ally DeShong CMA      Review of Systems   Constitutional, HEENT, cardiovascular, pulmonary, gi and gu systems are negative, except as otherwise noted.      Objective    /74   Pulse 69   Temp 97.8  F (36.6  C) (Tympanic)   Resp 15   Ht 1.575 m (5' 2\")   Wt 90.7 kg (200 lb)   SpO2 96%   BMI 36.58 kg/m    Body mass index is 36.58 kg/m .  Physical Exam   GENERAL: healthy, alert and no distress  MS: Left knee: Scar from previous for which she has an appointment intact clean, right knee, extension to 40 degrees, flexion to about 20 degrees, no erythema, no effusion, no rash, strength 4 out of 5, pain and discomfort with range of motion, there medial and lateral joint line tenderness          Xr knee  No significant joint space narrowing. Minimal patellofemoral compartment marginal bony spurring. No knee joint effusion.  .     EXAM: XR KNEE 3 VIEWS RIGHT   LOCATION: [facility]   DATE/TIME: 11/30/2022 11:07 AM     INDICATION: KNEE PAIN/PROBLEM   COMPARISON: None.    "

## 2022-12-29 NOTE — PATIENT INSTRUCTIONS
"Recommendations from today's MTM visit:                                                    MTM (medication therapy management) is a service provided by a clinical pharmacist designed to help you get the most of out of your medicines.   Today we reviewed what your medicines are for, how to know if they are working, that your medicines are safe and how to make your medicine regimen as easy as possible.      1. Per phone call with nurse at [facility], you should be taking aripiprazole 15 mg nightly and paroxetine 40 mg daily. So no changes need to be made to your medication doses. I would recommend you continue to follow-up with your psychiatrist and therapist for management of your mental health.    2. Sent refill of Narcan to [facility] in [place].     3. Schedule a follow up appointment with Dr. King. You should get your routine labs done at this time.    4. Sent message to endocrinology team regarding Accu-chek Verónica Plus testing supplies, FreeStyle stan continuous glucose meter, and switching metformin to the extended-release formulation. If metformin is switched to the extended release formulation, then you will need to take 2 tablets (500 mg) by mouth twice daily.    5. Consider melatonin 1-3 mg nightly to help with sleep.    6. Consider using TUMS to help with your acid reflux.    Follow-up: 1 month    It was great speaking with you today.  I value your experience and would be very thankful for your time in providing feedback in our clinic survey. In the next few days, you may receive an email or text message from [employer] with a link to a survey related to your  clinical pharmacist.\"     To schedule another MTM appointment, please call the clinic directly or you may call the MTM scheduling line at [phone] or toll-free at [phone].     My Clinical Pharmacist's contact information:                                                      Please feel free to contact me with any questions " or concerns you have.      Rosana Fulton, PharmD   Medication Therapy Management

## 2022-12-29 NOTE — TELEPHONE ENCOUNTER
Patient and daughter request alternative glucose testing supplies (Accu-chek Verónica Plus) as current supplies do not work with meter. Holly has an Accu-chek Verónica Plus meter.    Cata requests Freestyle Mathieu CGM be sent as fingerstick is difficult with Holly's tremor. Holly and Cata are aware that Medicare may not cover this, but willing to try. She does have a smart phone that she would like to use, instead of the reader.    Holly has had ongoing diarrhea which could be caused by metformin. Could consider switching to metformin extended release formulation to see if this helps.    Rosana Fulton, PharmD   Medication Therapy Management

## 2023-01-02 ENCOUNTER — OFFICE VISIT (OUTPATIENT)
Dept: FAMILY MEDICINE | Facility: CLINIC | Age: 68
End: 2023-01-02
Payer: COMMERCIAL

## 2023-01-02 VITALS
OXYGEN SATURATION: 96 % | HEART RATE: 69 BPM | WEIGHT: 200 LBS | TEMPERATURE: 97.8 F | HEIGHT: 62 IN | RESPIRATION RATE: 15 BRPM | SYSTOLIC BLOOD PRESSURE: 118 MMHG | DIASTOLIC BLOOD PRESSURE: 74 MMHG | BODY MASS INDEX: 36.8 KG/M2

## 2023-01-02 DIAGNOSIS — M17.11 PRIMARY OSTEOARTHRITIS OF RIGHT KNEE: Primary | ICD-10-CM

## 2023-01-02 DIAGNOSIS — F11.20 CONTINUOUS OPIOID DEPENDENCE (H): ICD-10-CM

## 2023-01-02 DIAGNOSIS — Z12.31 VISIT FOR SCREENING MAMMOGRAM: ICD-10-CM

## 2023-01-02 DIAGNOSIS — G89.4 CHRONIC PAIN SYNDROME: ICD-10-CM

## 2023-01-02 DIAGNOSIS — Z23 ENCOUNTER FOR ADMINISTRATION OF COVID-19 VACCINE: ICD-10-CM

## 2023-01-02 PROCEDURE — 99213 OFFICE O/P EST LOW 20 MIN: CPT | Performed by: STUDENT IN AN ORGANIZED HEALTH CARE EDUCATION/TRAINING PROGRAM

## 2023-01-02 PROCEDURE — 91313 COVID-19 VACCINE BIVALENT BOOSTER 18+ (MODERNA): CPT | Performed by: STUDENT IN AN ORGANIZED HEALTH CARE EDUCATION/TRAINING PROGRAM

## 2023-01-02 PROCEDURE — 0134A COVID-19 VACCINE BIVALENT BOOSTER 18+ (MODERNA): CPT | Performed by: STUDENT IN AN ORGANIZED HEALTH CARE EDUCATION/TRAINING PROGRAM

## 2023-01-02 ASSESSMENT — PAIN SCALES - GENERAL: PAINLEVEL: SEVERE PAIN (6)

## 2023-01-02 NOTE — PATIENT INSTRUCTIONS
David Barrera,    Thank you for allowing [facility] to manage your care.  Follow up with the knee specialist tomorrow    Continue to apply Ice 5-20 minutes at a time several times a day or as needed.  * rest, sitting  * Activity modification - avoid impact activities or activities that aggravate symptoms.   Exercise: low impact such as stationary bike, elliptical, pool.  * Bracing: bracing the knee may offer some relief of symptoms when worn and provide some stability.  * topical ointments may help as well      For your convenience, test results are released as soon as they are available  Please allow 1-2 business days for me to send you a comment about your results.  If not done so, I encourage you to login into [employer] ([email or web address] to review your results in real time.     If you have any questions or concerns, please feel free to call us at [phone].    Sincerely,    Dr. Hobbs    Did you know?      You can schedule a video visit for follow-up appointments as well as future appointments for certain conditions.  Please see the below link.     [email or web address]    If you have not already done so,  I encourage you to sign up for [employer] ([email or web address]).  This will allow you to review your results, securely communicate with a provider, and schedule virtual visits as well.

## 2023-01-03 ENCOUNTER — TELEPHONE (OUTPATIENT)
Dept: ENDOCRINOLOGY | Facility: CLINIC | Age: 68
End: 2023-01-03

## 2023-01-03 ENCOUNTER — PRE VISIT (OUTPATIENT)
Dept: ORTHOPEDICS | Facility: CLINIC | Age: 68
End: 2023-01-03

## 2023-01-03 ENCOUNTER — OFFICE VISIT (OUTPATIENT)
Dept: ORTHOPEDICS | Facility: CLINIC | Age: 68
End: 2023-01-03
Payer: COMMERCIAL

## 2023-01-03 DIAGNOSIS — S89.91XA INJURY OF RIGHT KNEE, INITIAL ENCOUNTER: Primary | ICD-10-CM

## 2023-01-03 PROCEDURE — 99213 OFFICE O/P EST LOW 20 MIN: CPT | Performed by: ORTHOPAEDIC SURGERY

## 2023-01-03 ASSESSMENT — PAIN SCALES - GENERAL: PAINLEVEL: SEVERE PAIN (6)

## 2023-01-03 NOTE — TELEPHONE ENCOUNTER
[facility] Call Center    Phone Message    May a detailed message be left on voicemail: no     Reason for Call: Medication Question or concern regarding medication   Prescription Clarification  Name of Medication: Free style sensors  Prescribing Provider: Dr. Medrano   Pharmacy: [facility]   What on the order needs clarification? Pharmacy requesting call back to discuss a prior auth on the above medication

## 2023-01-03 NOTE — NURSING NOTE
Holly Muir's chief complaint for this visit includes:  Chief Complaint   Patient presents with     Consult     R knee pain.        Referring Provider:  No referring provider defined for this encounter.    There were no vitals taken for this visit.  Severe Pain (6)     Pain increases with: Going up stairs, weight bearing,. Walking.  Previous surgeries: L Knee, 1975-86ish  Previous injections within last 6 months: YES - Date: 11/10/22  Treatments done: Injections, Brace, Ice/Heat, ibuprofen, tramadol. Doesn't help  Imaging completed: XR today  Pain: 6/10, shoots to 10/10 going up stairs  Concerns: none    Martinez Patel, ATC

## 2023-01-03 NOTE — PROGRESS NOTES
Assessment: This is a 67 year old with a somewhat equivocal knee examination and unremarkable radiographs. Discussed the differential including intra-articular symptom source history of swelling could be crystaline issue but no history and knee is not consistent with this now.     Plan:  MR right knee with the   Chief Complaint: Consult (R knee pain. )      Physician:  No ref. provider found    HPI: Holly Muir is a 67 year old female who presents today for evaluation of her right knee.     Symptom Profile  Location of symptoms:  Medial joint line   Onset: acute, woke up with it  Trend: getting no better or worse   Duration of symptoms: a few weeks   Quality of symptoms: aching, sharp/stabbing  Severity: moderate severe  Alleviate: activity modification  Exacerbating: activities, stairs, house work   Previous Treatments: Previous treatments include activity modification, oral pain medication (tramadol).   Intra-articular injections about 1-2 months ago.       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 MAX ACETAMINOPHEN DOSE: 4000MG IN 24 HRS., Disp: 180 tablet, Rfl: 0     ARIPiprazole (ABILIFY) 15 MG tablet, Take 15 mg by mouth At Bedtime, Disp: , Rfl:      blood glucose (ACCU-CHEK CELIA PLUS) test strip, Use to test blood sugar 4 times daily or as directed., Disp: 100 strip, Rfl: 3     blood glucose (NO BRAND SPECIFIED) test strip, Needs  VERIO strips. Use to test blood sugar 3 times daily., Disp: 100 strip, Rfl: 11     blood glucose monitoring (ACCU-CHEK CELIA PLUS) meter device kit, Use to test blood sugar 4 times daily or as directed., Disp: , Rfl:      blood glucose monitoring (SOFTCLIX) lancets, Use to test blood sugar 4 times daily or as directed., Disp: 100 each, Rfl: 3     buPROPion (WELLBUTRIN XL) 150 MG 24 hr tablet, Take 150 mg by mouth daily, Disp: , Rfl:      busPIRone HCl (BUSPAR) 30 MG tablet, TAKE 1 TABLET BY MOUTH TWICE A DAY, Disp: 60 tablet, Rfl: 5     celecoxib (CELEBREX) 100 MG capsule, TAKE 1 CAPSULE BY MOUTH TWICE A DAY, Disp: 60 capsule, Rfl: 2     cetirizine (ZYRTEC) 10 MG tablet, TAKE 1 TABLET BY MOUTH EVERY DAY, Disp: 90 tablet, Rfl: 1     Continuous Blood Gluc Sensor (FREESTYLE CM 14 DAY SENSOR) MISC, Use one sensor every 14 days.  Use to read blood sugar per 's instructions, Disp: 6 each, Rfl: 3     CVS ASPIRIN ADULT LOW DOSE 81 MG chewable tablet, TAKE 1 TABLET BY MOUTH EVERY DAY, Disp: 90 tablet, Rfl: 0     ferrous gluconate (FERGON) 324 (38 Fe) MG tablet, TAKE 1 TABLET (324 MG) BY MOUTH DAILY (WITH BREAKFAST), Disp: 90 tablet, Rfl: 3     gabapentin (NEURONTIN) 600 MG tablet, TAKE 2 TABLETS (1,200 MG) BY MOUTH 3 TIMES DAILY FOR 30 DAYS, Disp: 180 tablet, Rfl: 5     glimepiride (AMARYL) 4 MG tablet, Take 1 tablet (4 mg) by mouth every morning (before breakfast), Disp: 90 tablet, Rfl: 1     ibuprofen (ADVIL/MOTRIN) 200 MG tablet, Take 200 mg by mouth as needed for pain, Disp: , Rfl:      insulin glargine (LANTUS SOLOSTAR) 100 UNIT/ML pen, Inject 45 Units Subcutaneous At Bedtime, Disp: 45 mL, Rfl: 1     insulin pen needle (31G X 5 MM) 31G X 5 MM miscellaneous, Use four times 4 day times a day, Disp: 200 each, Rfl: 3     Lancets (ONETOUCH DELICA PLUS [id number]) MISC, 1 each 3 times daily, Disp: 100 each, Rfl: 11     metFORMIN (GLUCOPHAGE XR) 500 MG 24 hr tablet, Take 2 tablets (1,000 mg) by mouth 2 times  daily (with meals), Disp: 120 tablet, Rfl: 11     metFORMIN (GLUCOPHAGE) 1000 MG tablet, Take 1 tablet (1,000 mg) by mouth 2 times daily (with meals), Disp: 180 tablet, Rfl: 3     Multiple Vitamin (DAILY-ISAAC MULTIVITAMIN) TABS, TAKE 1 TABLET BY MOUTH EVERY DAY, Disp: 90 tablet, Rfl: 2     naloxone (NARCAN) 4 MG/0.1ML nasal spray, Spray 1 spray (4 mg) into one nostril alternating nostrils once as needed for opioid reversal every 2-3 minutes until assistance arrives, Disp: 0.2 mL, Rfl: 0     nystatin (MYCOSTATIN) 542711 UNIT/GM external powder, Apply 1 dose topically 3 times daily as needed, Disp: 60 g, Rfl: 3     omeprazole (PRILOSEC) 20 MG DR capsule, TAKE 2 CAPSULES (40 MG) BY MOUTH DAILY *TAKE 30-60 MINUTES BEFORE A MEAL*, Disp: 180 capsule, Rfl: 0     order for DME, Equipment being ordered: grab bar for bath tub/shower., Disp: 1 Units, Rfl: 0     order for DME, Equipment being ordered: railing for stairs at home., Disp: 1 Units, Rfl: 0     order for DME, Equipment being ordered: Knee walker , Disp: 1 Device, Rfl: 0     order for DME, by Device route continuous Air walker -  Use as instructed, Disp: 1 Device, Rfl: 0     ORDER FOR DME, Equipment being ordered:  Ostomy supplies.  Drain Pouch by [employer] #.    Also needs Barrier by Valarie #., Disp: 3 Month, Rfl: 4     ORDER FOR DME, Equipment being ordered: Cane, Disp: 1 Units, Rfl: 0     PARoxetine (PAXIL) 20 MG tablet, TAKE 2 TABLETS AT BEDTIME (Pres[facility]), Disp: 30 tablet, Rfl:      pioglitazone (ACTOS) 45 MG tablet, Take 1 tablet (45 mg) by mouth daily, Disp: 90 tablet, Rfl: 3     propranolol ER (INDERAL LA) 80 MG 24 hr capsule, TAKE 1 CAPSULE BY MOUTH EVERY DAY, Disp: 90 capsule, Rfl: 2     ramipril (ALTACE) 5 MG capsule, TAKE 1 CAPSULE BY MOUTH EVERY DAY, Disp: 90 capsule, Rfl: 2     rOPINIRole (REQUIP) 0.25 MG tablet, TAKE 1 TABLET (0.25 MG) BY MOUTH EVERY EVENING, Disp: 90 tablet, Rfl: 2      simvastatin (ZOCOR) 20 MG tablet, TAKE 1 TABLET BY MOUTH AT BEDTIME, Disp: 90 tablet, Rfl: 0     traMADol (ULTRAM) 50 MG tablet, Take 1 tablet (50 mg) by mouth 2 times daily as needed for severe pain (7-10), Disp: 30 tablet, Rfl: 0     traZODone (DESYREL) 50 MG tablet, Take 1.5 tablets (75 mg) by mouth At Bedtime, Disp: , Rfl:      UNIVERSAL REMOVER WIPES EX MISC, uses to remove barrier from colostomy bag at time of change, Disp: 1 Each, Rfl: 3     vitamin C (CVS VITAMIN C) 500 MG tablet, 1/2 TABLET BY MOUTH JACOBSON WITH IRON SUPPLEMENT BEFORE MEAL, Disp: 45 tablet, Rfl: 3     VITAMIN D3 50 MCG (2000 UT) tablet, TAKE 1 TABLET BY MOUTH EVERY DAY, Disp: 90 tablet, Rfl: 3    Current Facility-Administered Medications:      lidocaine 1 % injection 2 mL, 2 mL, , , MurtazaenholChristopher monae, DO, 2 mL at 11/19/22 1030     triamcinolone (KENALOG-40) injection 40 mg, 40 mg, , , Christopher Sanderson, DO, 40 mg at 11/19/22 1030     triamcinolone (KENALOG-40) injection 40 mg, 40 mg, , , Santy Harrington, DO, 40 mg at 11/10/22 1401     triamcinolone (KENALOG-40) injection 40 mg, 40 mg, , , Santy Harrington, DO, 40 mg at 11/10/22 1401    Allergies: Morphine sulfate    SURGICAL HISTORY:   Past Surgical History:   Procedure Laterality Date     ARTHROSCOPY KNEE RT/LT      LT     BLEPHAROPLASTY BILATERAL Bilateral 8/16/2019    Procedure: BILATERAL UPPER LID BLEPHAROPLASTY;  Surgeon: Randolph Cook MD;  Location: [facility] OR     BREAST LUMPECTOMY, RT/LT      LT-benign      CATARACT IOL, RT/LT       COLONOSCOPY  5/10/2012    Procedure:COLONOSCOPY; screening colonoscopy; Surgeon:MILLA VEGA; Location:[facility] OR     COLONOSCOPY WITH CO2 INSUFFLATION N/A 8/2/2019    Procedure: Colonoscopy with CO2 insufflation;  Surgeon: Himanshu Hi MD;  Location: [facility] OR     COLOSTOMY  2000     COLOSTOMY       HYSTERECTOMY, PAP NO LONGER INDICATED      done in [place]-cervical cancer     IMPLANT STIMULATOR SACRAL NERVE STAGE ONE Right  3/10/2015    Procedure: IMPLANT STIMULATOR SACRAL NERVE STAGE ONE;  Surgeon: Teodora Yusuf MD;  Location: [facility]     IMPLANT STIMULATOR SACRAL NERVE STAGE TWO Right 3/31/2015    Procedure: IMPLANT STIMULATOR SACRAL NERVE STAGE TWO;  Surgeon: Teodora Yusuf MD;  Location: [facility]     IMPLANT STIMULATOR SACRAL NERVE STAGE TWO Right 2020    Procedure: INSERTION, SACRAL NERVE STIMULATOR, STAGE 2 (replacement of interstim battery);  Surgeon: Teodora Yusuf MD;  Location: [facility]     PHACOEMULSIFICATION WITH STANDARD INTRAOCULAR LENS IMPLANT Left 2019    Procedure: LEFT PHACOEMULSIFICATION, CATARACT, WITH STANDARD IOL INSERTION;  Surgeon: Francesco Winn MD;  Location: [facility]     PHACOEMULSIFICATION WITH STANDARD INTRAOCULAR LENS IMPLANT Right 10/24/2019    Procedure: RIGHT PHACOEMULSIFICATION, CATARACT, WITH STANDARD IOL INSERTION;  Surgeon: Francesco Winn MD;  Location: [facility]     REPAIR PTOSIS Bilateral 2019     SALPINGO OOPHORECTOMY,R/L/JENNIFER      Salpingo Oophorectomy, RT/LT/JENNIFER     ZZC EXCIS PTERYGIUM  10/08    [facility]     ZZC STOMACH SURGERY PROCEDURE UNLISTED       ZZHC COLONOSCOPY THRU STOMA, DIAGNOSTIC      normal per report-no records available-records destroyed   bladder stimulator    FAMILY HISTORY:   Family History   Problem Relation Age of Onset     Diabetes Mother      Cerebrovascular Disease Mother      Diabetes Father      Hypertension Father      Cancer Maternal Grandfather      Cancer Paternal Grandfather      Diabetes Brother      Diabetes Sister      Thyroid Disease Daughter      Thyroid Disease Sister      Multiple Sclerosis Daughter      Glaucoma No family hx of      Macular Degeneration No family hx of        SOCIAL HISTORY:   Social History     Tobacco Use     Smoking status: Former     Packs/day: 0.00     Types: Cigarettes     Start date: 1974     Quit date: 1975     Years since quittin.4     Smokeless tobacco: Never   Substance Use  Topics     Alcohol use: Yes     Alcohol/week: 0.0 standard drinks     Comment: social occasions       REVIEW OF SYSTEMS:  The comprehensive review of systems from the intake form was reviewed with the patient.  No fever, weight change or fatigue. No dry eyes. No oral ulcers, sore throat or voice change. No palpitations, syncope, angina or edema.  No chest pain, excessive sleepiness, shortness of breath or hemoptysis.   No abdominal pain, nausea, vomiting, diarrhea or heartburn.  No skin rash. No focal weakness or numbness. No bleeding or lymphadenopathy. No rhinitis or hives.     Exam:  On physical examination the patient appears the stated age, is in no acute distress, affect is appropriate, and breathing is non-labored.  Vitals are documented in the EMR and have been reviewed:    There were no vitals taken for this visit.  Data Unavailable  There is no height or weight on file to calculate BMI.    Rises from chair:  Gait:  Gains the exam table:    Right  Knee  Appearance: benign  Clinical alignment: neutral   Effusion:no  Tenderness to palpation:medial joint line  Extension:0  Flexion: 115  Collateral ligaments: intact  Cruciate ligaments: grossly intact       Hip examination: benign hip ROM with groin or anterior thigh symptoms.       Distally, the circulatory, motor, and sensation exam is intact with 5/5 EHL, gastroc-soleus, and tibialis anterior.    Sensation to light touch is intact.    Dorsalis pedis and posterior tibialis pulses are palpable.    There are no sores on the feet, no bruising, and no lymphedema.    X-rays:   INDICATION: KNEE PAIN/PROBLEM   COMPARISON: None.  Procedure Note    Jose Denson MD - 11/30/2022   Formatting of this note might be different from the original.   For Patients: As a result of the 21st Century Cures Act, medical imaging exams and procedure reports are released immediately into your electronic medical record. You may view this report before your referring provider.  If you have questions, please contact your health care provider.     EXAM: XR KNEE 3 VIEWS RIGHT   LOCATION: [facility]   DATE/TIME: 11/30/2022 11:07 AM     INDICATION: KNEE PAIN/PROBLEM   COMPARISON: None.     IMPRESSION:   No acute fracture or malalignment. No significant joint space narrowing. Minimal patellofemoral compartment marginal bony spurring. No knee joint effusion.

## 2023-01-03 NOTE — TELEPHONE ENCOUNTER
Called patient and left voicemail. Patient has appointment on 1/4/23. Need to collect 14 days of blood sugar readings if patient is using meter, or if patient is using CGM, need to get patients device uploaded to retrieve report for provider to review. Alejandra Duvall on 1/3/2023 at 10:27 AM

## 2023-01-03 NOTE — LETTER
1/3/2023         RE: Holly Muir  [id number] [facility] [id number]        Dear Colleague,    Thank you for referring your patient, Holly Muir, to the [facility]. Please see a copy of my visit note below.    Assessment: This is a 67 year old with a somewhat equivocal knee examination and unremarkable radiographs. Discussed the differential including intra-articular symptom source history of swelling could be crystaline issue but no history and knee is not consistent with this now.     Plan:  MR right knee with the   Chief Complaint: Consult (R knee pain. )      Physician:  No ref. provider found    HPI: Holly Muir is a 67 year old female who presents today for evaluation of her right knee.     Symptom Profile  Location of symptoms:  Medial joint line   Onset: acute, woke up with it  Trend: getting no better or worse   Duration of symptoms: a few weeks   Quality of symptoms: aching, sharp/stabbing  Severity: moderate severe  Alleviate: activity modification  Exacerbating: activities, stairs, house work   Previous Treatments: Previous treatments include activity modification, oral pain medication (tramadol).   Intra-articular injections about 1-2 months ago.       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 MAX ACETAMINOPHEN DOSE: 4000MG IN 24 HRS., Disp: 180 tablet, Rfl: 0     ARIPiprazole  (ABILIFY) 15 MG tablet, Take 15 mg by mouth At Bedtime, Disp: , Rfl:      blood glucose (ACCU-CHEK CELIA PLUS) test strip, Use to test blood sugar 4 times daily or as directed., Disp: 100 strip, Rfl: 3     blood glucose (NO BRAND SPECIFIED) test strip, Needs VERIO strips. Use to test blood sugar 3 times daily., Disp: 100 strip, Rfl: 11     blood glucose monitoring (ACCU-CHEK CELIA PLUS) meter device kit, Use to test blood sugar 4 times daily or as directed., Disp: , Rfl:      blood glucose monitoring (SOFTCLIX) lancets, Use to test blood sugar 4 times daily or as directed., Disp: 100 each, Rfl: 3     buPROPion (WELLBUTRIN XL) 150 MG 24 hr tablet, Take 150 mg by mouth daily, Disp: , Rfl:      busPIRone HCl (BUSPAR) 30 MG tablet, TAKE 1 TABLET BY MOUTH TWICE A DAY, Disp: 60 tablet, Rfl: 5     celecoxib (CELEBREX) 100 MG capsule, TAKE 1 CAPSULE BY MOUTH TWICE A DAY, Disp: 60 capsule, Rfl: 2     cetirizine (ZYRTEC) 10 MG tablet, TAKE 1 TABLET BY MOUTH EVERY DAY, Disp: 90 tablet, Rfl: 1     Continuous Blood Gluc Sensor (FREESTYLE CM 14 DAY SENSOR) MISC, Use one sensor every 14 days.  Use to read blood sugar per 's instructions, Disp: 6 each, Rfl: 3     CVS ASPIRIN ADULT LOW DOSE 81 MG chewable tablet, TAKE 1 TABLET BY MOUTH EVERY DAY, Disp: 90 tablet, Rfl: 0     ferrous gluconate (FERGON) 324 (38 Fe) MG tablet, TAKE 1 TABLET (324 MG) BY MOUTH DAILY (WITH BREAKFAST), Disp: 90 tablet, Rfl: 3     gabapentin (NEURONTIN) 600 MG tablet, TAKE 2 TABLETS (1,200 MG) BY MOUTH 3 TIMES DAILY FOR 30 DAYS, Disp: 180 tablet, Rfl: 5     glimepiride (AMARYL) 4 MG tablet, Take 1 tablet (4 mg) by mouth every morning (before breakfast), Disp: 90 tablet, Rfl: 1     ibuprofen (ADVIL/MOTRIN) 200 MG tablet, Take 200 mg by mouth as needed for pain, Disp: , Rfl:      insulin glargine (LANTUS SOLOSTAR) 100 UNIT/ML pen, Inject 45 Units Subcutaneous At Bedtime, Disp: 45 mL, Rfl: 1     insulin pen needle (31G X 5 MM) 31G X 5 MM  miscellaneous, Use four times 4 day times a day, Disp: 200 each, Rfl: 3     Lancets (ONETOUCH DELICA PLUS WAUSKR42H) MISC, 1 each 3 times daily, Disp: 100 each, Rfl: 11     metFORMIN (GLUCOPHAGE XR) 500 MG 24 hr tablet, Take 2 tablets (1,000 mg) by mouth 2 times daily (with meals), Disp: 120 tablet, Rfl: 11     metFORMIN (GLUCOPHAGE) 1000 MG tablet, Take 1 tablet (1,000 mg) by mouth 2 times daily (with meals), Disp: 180 tablet, Rfl: 3     Multiple Vitamin (DAILY-ISAAC MULTIVITAMIN) TABS, TAKE 1 TABLET BY MOUTH EVERY DAY, Disp: 90 tablet, Rfl: 2     naloxone (NARCAN) 4 MG/0.1ML nasal spray, Spray 1 spray (4 mg) into one nostril alternating nostrils once as needed for opioid reversal every 2-3 minutes until assistance arrives, Disp: 0.2 mL, Rfl: 0     nystatin (MYCOSTATIN) 616207 UNIT/GM external powder, Apply 1 dose topically 3 times daily as needed, Disp: 60 g, Rfl: 3     omeprazole (PRILOSEC) 20 MG DR capsule, TAKE 2 CAPSULES (40 MG) BY MOUTH DAILY *TAKE 30-60 MINUTES BEFORE A MEAL*, Disp: 180 capsule, Rfl: 0     order for DME, Equipment being ordered: grab bar for bath tub/shower., Disp: 1 Units, Rfl: 0     order for DME, Equipment being ordered: railing for stairs at home., Disp: 1 Units, Rfl: 0     order for DME, Equipment being ordered: Knee walker , Disp: 1 Device, Rfl: 0     order for DME, by Device route continuous Air walker -  Use as instructed, Disp: 1 Device, Rfl: 0     ORDER FOR DME, Equipment being ordered:  Ostomy supplies.  Drain Pouch by Valarie #.    Also needs Barrier by Valarie #., Disp: 3 Month, Rfl: 4     ORDER FOR DME, Equipment being ordered: Cane, Disp: 1 Units, Rfl: 0     PARoxetine (PAXIL) 20 MG tablet, TAKE 2 TABLETS AT BEDTIME (Prescribed by Dr. Isael Jim[facility]nez at [facility]), Disp: 30 tablet, Rfl:      pioglitazone (ACTOS) 45 MG tablet, Take 1 tablet (45 mg) by mouth daily, Disp: 90 tablet, Rfl: 3     propranolol ER (INDERAL LA) 80 MG 24 hr capsule, TAKE 1  CAPSULE BY MOUTH EVERY DAY, Disp: 90 capsule, Rfl: 2     ramipril (ALTACE) 5 MG capsule, TAKE 1 CAPSULE BY MOUTH EVERY DAY, Disp: 90 capsule, Rfl: 2     rOPINIRole (REQUIP) 0.25 MG tablet, TAKE 1 TABLET (0.25 MG) BY MOUTH EVERY EVENING, Disp: 90 tablet, Rfl: 2     simvastatin (ZOCOR) 20 MG tablet, TAKE 1 TABLET BY MOUTH AT BEDTIME, Disp: 90 tablet, Rfl: 0     traMADol (ULTRAM) 50 MG tablet, Take 1 tablet (50 mg) by mouth 2 times daily as needed for severe pain (7-10), Disp: 30 tablet, Rfl: 0     traZODone (DESYREL) 50 MG tablet, Take 1.5 tablets (75 mg) by mouth At Bedtime, Disp: , Rfl:      UNIVERSAL REMOVER WIPES EX MISC, uses to remove barrier from colostomy bag at time of change, Disp: 1 Each, Rfl: 3     vitamin C (CVS VITAMIN C) 500 MG tablet, 1/2 TABLET BY MOUTH JACOBSON WITH IRON SUPPLEMENT BEFORE MEAL, Disp: 45 tablet, Rfl: 3     VITAMIN D3 50 MCG (2000 UT) tablet, TAKE 1 TABLET BY MOUTH EVERY DAY, Disp: 90 tablet, Rfl: 3    Current Facility-Administered Medications:      lidocaine 1 % injection 2 mL, 2 mL, , , Christopher Sanderson, DO, 2 mL at 11/19/22 1030     triamcinolone (KENALOG-40) injection 40 mg, 40 mg, , , Christopher Sanderson, DO, 40 mg at 11/19/22 1030     triamcinolone (KENALOG-40) injection 40 mg, 40 mg, , , Santy Harrington, DO, 40 mg at 11/10/22 1401     triamcinolone (KENALOG-40) injection 40 mg, 40 mg, , , Santy Harrington, DO, 40 mg at 11/10/22 1401    Allergies: Morphine sulfate    SURGICAL HISTORY:   Past Surgical History:   Procedure Laterality Date     ARTHROSCOPY KNEE RT/LT      LT     BLEPHAROPLASTY BILATERAL Bilateral 8/16/2019    Procedure: BILATERAL UPPER LID BLEPHAROPLASTY;  Surgeon: Randolph [facility] MD;  Location: [facility]     BREAST LUMPECTOMY, RT/LT      LT-benign      CATARACT IOL, RT/LT       COLONOSCOPY  5/10/2012    Procedure:COLONOSCOPY; screening colonoscopy; Surgeon:MILLA VEGA; Location:[facility]     COLONOSCOPY WITH CO2 INSUFFLATION N/A 8/2/2019    Procedure:  Colonoscopy with CO2 insufflation;  Surgeon: Himanshu Hi MD;  Location: [facility]     COLOSTOMY  2000     COLOSTOMY       HYSTERECTOMY, PAP NO LONGER INDICATED      done in [place]-cervical cancer     IMPLANT STIMULATOR SACRAL NERVE STAGE ONE Right 3/10/2015    Procedure: IMPLANT STIMULATOR SACRAL NERVE STAGE ONE;  Surgeon: Teodora Yusuf MD;  [facility]     IMPLANT STIMULATOR SACRAL NERVE STAGE TWO Right 3/31/2015    Procedure: IMPLANT STIMULATOR SACRAL NERVE STAGE TWO;  Surgeon: Teodora Yusuf MD;  [facility]     IMPLANT STIMULATOR SACRAL NERVE STAGE TWO Right 6/22/2020    Procedure: INSERTION, SACRAL NERVE STIMULATOR, STAGE 2 (replacement of interstim battery);  Surgeon: Teodora Yusuf MD;  Location: [facility]     PHACOEMULSIFICATION WITH STANDARD INTRAOCULAR LENS IMPLANT Left 9/26/2019    Procedure: LEFT PHACOEMULSIFICATION, CATARACT, WITH STANDARD IOL INSERTION;  Surgeon: Francesco Winn MD;  Location: [facility]     PHACOEMULSIFICATION WITH STANDARD INTRAOCULAR LENS IMPLANT Right 10/24/2019    Procedure: RIGHT PHACOEMULSIFICATION, CATARACT, WITH STANDARD IOL INSERTION;  Surgeon: Francesco Winn MD;  Location: [facility]     REPAIR PTOSIS Bilateral 08/16/2019     SALPINGO OOPHORECTOMY,R/L/JENNIFER      Salpingo Oophorectomy, RT/LT/JENNIFER     ZZC EXCIS PTERYGIUM  10/08    [facility]     ZZC STOMACH SURGERY PROCEDURE UNLISTED       ZZ COLONOSCOPY THRU STOMA, DIAGNOSTIC  2002    normal per report-no records available-records destroyed   bladder stimulator    FAMILY HISTORY:   Family History   Problem Relation Age of Onset     Diabetes Mother      Cerebrovascular Disease Mother      Diabetes Father      Hypertension Father      Cancer Maternal Grandfather      Cancer Paternal Grandfather      Diabetes Brother      Diabetes Sister      Thyroid Disease Daughter      Thyroid Disease Sister      Multiple Sclerosis Daughter      Glaucoma No family hx of      Macular Degeneration No  family hx of        SOCIAL HISTORY:   Social History     Tobacco Use     Smoking status: Former     Packs/day: 0.00     Types: Cigarettes     Start date: 1974     Quit date: 1975     Years since quittin.4     Smokeless tobacco: Never   Substance Use Topics     Alcohol use: Yes     Alcohol/week: 0.0 standard drinks     Comment: social occasions       REVIEW OF SYSTEMS:  The comprehensive review of systems from the intake form was reviewed with the patient.  No fever, weight change or fatigue. No dry eyes. No oral ulcers, sore throat or voice change. No palpitations, syncope, angina or edema.  No chest pain, excessive sleepiness, shortness of breath or hemoptysis.   No abdominal pain, nausea, vomiting, diarrhea or heartburn.  No skin rash. No focal weakness or numbness. No bleeding or lymphadenopathy. No rhinitis or hives.     Exam:  On physical examination the patient appears the stated age, is in no acute distress, affect is appropriate, and breathing is non-labored.  Vitals are documented in the EMR and have been reviewed:    There were no vitals taken for this visit.  Data Unavailable  There is no height or weight on file to calculate BMI.    Rises from chair:  Gait:  Gains the exam table:    Right  Knee  Appearance: benign  Clinical alignment: neutral   Effusion:no  Tenderness to palpation:medial joint line  Extension:0  Flexion: 115  Collateral ligaments: intact  Cruciate ligaments: grossly intact       Hip examination: benign hip ROM with groin or anterior thigh symptoms.       Distally, the circulatory, motor, and sensation exam is intact with 5/5 EHL, gastroc-soleus, and tibialis anterior.    Sensation to light touch is intact.    Dorsalis pedis and posterior tibialis pulses are palpable.    There are no sores on the feet, no bruising, and no lymphedema.    X-rays:   INDICATION: KNEE PAIN/PROBLEM   COMPARISON: None.  Procedure Note    Jose Denson MD - 2022   Formatting of this  note might be different from the original.   For Patients: As a result of the 21st Century Cures Act, medical imaging exams and procedure reports are released immediately into your electronic medical record. You may view this report before your referring provider. If you have questions, please contact your health care provider.     EXAM: XR KNEE 3 VIEWS RIGHT   LOCATION: [facility]   DATE/TIME: 11/30/2022 11:07 AM     INDICATION: KNEE PAIN/PROBLEM   COMPARISON: None.     IMPRESSION:   No acute fracture or malalignment. No significant joint space narrowing. Minimal patellofemoral compartment marginal bony spurring. No knee joint effusion.            Again, thank you for allowing me to participate in the care of your patient.        Sincerely,        Sanford Sheehan MD

## 2023-01-04 ENCOUNTER — TELEPHONE (OUTPATIENT)
Dept: ENDOCRINOLOGY | Facility: CLINIC | Age: 68
End: 2023-01-04

## 2023-01-04 ENCOUNTER — VIRTUAL VISIT (OUTPATIENT)
Dept: ENDOCRINOLOGY | Facility: CLINIC | Age: 68
End: 2023-01-04
Payer: COMMERCIAL

## 2023-01-04 DIAGNOSIS — Z79.4 TYPE 2 DIABETES MELLITUS WITHOUT COMPLICATION, WITH LONG-TERM CURRENT USE OF INSULIN (H): Primary | ICD-10-CM

## 2023-01-04 DIAGNOSIS — E11.9 TYPE 2 DIABETES MELLITUS WITHOUT COMPLICATION, WITH LONG-TERM CURRENT USE OF INSULIN (H): Primary | ICD-10-CM

## 2023-01-04 PROCEDURE — 99214 OFFICE O/P EST MOD 30 MIN: CPT | Mod: 95 | Performed by: INTERNAL MEDICINE

## 2023-01-04 RX ORDER — GLIMEPIRIDE 2 MG/1
2 TABLET ORAL
Qty: 90 TABLET | Refills: 3 | Status: SHIPPED | OUTPATIENT
Start: 2023-01-04 | End: 2023-02-01

## 2023-01-04 NOTE — TELEPHONE ENCOUNTER
Louise please contact pharmacy for PA on [employer]  Mathieu. Sommer Newby RN on 1/4/2023 at 9:18 AM

## 2023-01-04 NOTE — TELEPHONE ENCOUNTER
Prior Authorization Approval    Authorization Effective Date: 12/5/2022  Authorization Expiration Date: 1/3/2026  Medication: [employer] Mathieu 2 -pa approved   Approved Dose/Quantity: uud   Reference #: Key: [id number]   Insurance Company: [employer] - Phone [phone] Fax [phone]  Expected CoPay:       CoPay Card Available:      Foundation Assistance Needed:    Which Pharmacy is filling the prescription (Not needed for infusion/clinic administered):    Pharmacy Notified:    Patient Notified:

## 2023-01-04 NOTE — LETTER
1/4/2023       RE: Holly Muir  [id number] [facility] [id number]     Dear Colleague,    Thank you for referring your patient, Holly Muir, to the [facility] ENDOCRINOLOGY CLINIC [place] at [facility]. Please see a copy of my visit note below.    Holly is a 67 year old who is being evaluated via a billable telephone visit.      What phone number would you like to be contacted at? [phone]    How would you like to obtain your AVS? Self Po[place]art     Outcome for 12/28/22 8:45 AM: [facility] message sent  Alejandra Jadiel, VF  Outcome for 01/02/23 12:16 PM: Left Voicemail   Malathi Mccarty, VF  Outcome for 01/03/23 10:25 AM: Left Voicemail   Alejandra Duvall, VF     Video  Start 8:15 am  End: 8:35 am  Dixty                                                            - Endocrinology Follow up -    Reason for visit/consult:  Uncontrolled type 2 diabetes mellitus with hyperglycemia, with long-term current use of insulin (H)    Primary care provider: Tiffany King      Assessment and Plan    # DM2, night time eating habits  A1c 8.9, improved from 10.5 over 1 month, by reviewing glucometer average glucose 156 past 1 month.  She mentioned occasionally lower side of glucose am, recent glucose 130-150s, however occasional hypoglycemia during the day 70s and morning    - Further reduce Basaglar from 45 to 40 units night    -Continue current metformin 1000 mg bid (she has diarrehea episodes, discussed with her and daughter, offered to stop or reduce, however thye want to continue and watch for now)     - Continue current Actos 45 mg, body weight stable.     - Glimepiride reduce from 4 mg daily to 2 mg daily    - lab prior to next visit      # DM education  Her daughter wants to know more about diabetic foods.   They will make appt with Mikayla.     # Mental health/Depression      # DM device  Newly prescribed Libre2, she has hand tremors and difficult for  check with glucometer    -RTC with me or any DM PA  in 6 months         30  minutes spent on the date of the encounter doing chart review, history and exam, documentation and further activities as noted above.    Elizabeth Medrano MD  Staff Physician  Endocrinology and Metabolism  [facility]  License: [place] [address]  Pager: [phone]      Current Diabetic Regimens:  Basaglar 40 units night (reduce from 45 to 40 in 1/2023)  NovoLog 6-12 units each meal (hold now as of 5/2022 due to compliance/depression)  Metformin 1000 twice daily (diarrhea)  Actos 45 mg daily  Glimepiride 2 mg daily (reduce from 4 mg to 2 mg)    Life Style:  Wake up 10-11 am  Breakfast after  Lunch 1-2 pm: sandwich  Dinner 6 pm: mexican fat foods  Bedtime: 9 pm  3 am : wake up and snacks    Exercise:  walking    DM complications:  Retinopathy: due not been for 1-2 years.   Nephropathy: urine microalbumin negative 8/2018 - due  Neuropathy:   Most recent LDL:   LDL Cholesterol Calculated   Date Value Ref Range Status   10/08/2021 39 <=100 mg/dL Final   06/18/2020 26 <100 mg/dL Final     Comment:     Desirable:       <100 mg/dl       Glucose Log: fluctuate from 55 - 400.     Interval History as of 5/13/2022 : Patient has been doing ok, depression fluctuates. She forgets insulin.   Interval History as of 11/12/2021 : Patient has been doing better, back to compliant to medication . New event includes : A1c increased 10.5 while she was in [place] and not better .  Interval History as of 12/11/2020 : Patient has been doing well.A1c 7.9 today (12/11/2020), her glucose improved significantly, came with her daughter today and she is watching mother's diet.   Interval History as of 6/5/2020 : Fluctuating glucose, lots fast foods nowadays. Medication compliance good .  Interval History as of 9/20/2019 : Follow-up in 4 months, compliant medications.  However she still have nighttime snacking habit every night, when she eat carbohydrate snacks  glucose in the morning was 250s.  Light fruits 160-180s.  Patient mentioned depression was worse spring 2019 but currently relatively okay and she has psychiatrist to follow-up.   Interval History as of 5/2019: Patient came by herself she missed appointment, increasing the dose of insulin, mentioned started to have a glucose increase 300 in the fasting in the morning her mental issue.  Seen by new psychiatrist. Visitign nurse once a week.    HPI: A 63 yo female second follow up DM2, she has remote history of colectomy. This is the third visit.   Used to use V go for several month, she was confused the system, worsened A1C, then we switch back to insulin injection regimens.    Currently lantus 38 utnis and novology 6-8-8 and sliding scale and metformin 1000 bid.   Last visit prscribed actos 30 mg daily, however she mentioned, she is not sure which one is actos and she may not taking actos.      Of note,  she has had sleeping issues, which distracts her life significantly. She cannot sleep at night, usually sleeps in the day, during the night, she has been hungry and eating peanuts butters, jelly, fruits etc.   Sometimes she cannot sleep for 2 days.   She has visiting nurse once a week, yesterday the nurse called our clinic that her glucose was 500. Talked with Mikayla, lantus was increased from 38 to 42 units.     Glucose: she forgot to bring glucometer today.     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     Past Surgical History:   Procedure Laterality Date     ARTHROSCOPY  KNEE RT/LT      LT     BLEPHAROPLASTY BILATERAL Bilateral 8/16/2019    Procedure: BILATERAL UPPER LID BLEPHAROPLASTY;  Surgeon: Randolph Cook MD;  Location: [facility] OR     BREAST LUMPECTOMY, RT/LT      LT-benign      CATARACT IOL, RT/LT       COLONOSCOPY  5/10/2012    Procedure:COLONOSCOPY; screening colonoscopy; Surgeon:MILLA VEGA; Location:[facility] OR     COLONOSCOPY WITH CO2 INSUFFLATION N/A 8/2/2019    Procedure: Colonoscopy with CO2 insufflation;  Surgeon: Himanshu Hi MD;  Location: [facility] OR     COLOSTOMY  2000     COLOSTOMY       HYSTERECTOMY, PAP NO LONGER INDICATED      done in [place]-cervical cancer     IMPLANT STIMULATOR SACRAL NERVE STAGE ONE Right 3/10/2015    Procedure: IMPLANT STIMULATOR SACRAL NERVE STAGE ONE;  Surgeon: Teodora Yusuf MD;  Location: [facility]     IMPLANT STIMULATOR SACRAL NERVE STAGE TWO Right 3/31/2015    Procedure: IMPLANT STIMULATOR SACRAL NERVE STAGE TWO;  Surgeon: Teodora Yusuf MD;  Location: [facility]     IMPLANT STIMULATOR SACRAL NERVE STAGE TWO Right 6/22/2020    Procedure: INSERTION, SACRAL NERVE STIMULATOR, STAGE 2 (replacement of interstim battery);  Surgeon: Teodora Yusuf MD;  Location: [facility] OR     PHACOEMULSIFICATION WITH STANDARD INTRAOCULAR LENS IMPLANT Left 9/26/2019    Procedure: LEFT PHACOEMULSIFICATION, CATARACT, WITH STANDARD IOL INSERTION;  Surgeon: Francesco Winn MD;  Location: [facility] OR     PHACOEMULSIFICATION WITH STANDARD INTRAOCULAR LENS IMPLANT Right 10/24/2019    Procedure: RIGHT PHACOEMULSIFICATION, CATARACT, WITH STANDARD IOL INSERTION;  Surgeon: Francesco Winn MD;  Location: [facility] OR     REPAIR PTOSIS Bilateral 08/16/2019     SALPINGO OOPHORECTOMY,R/L/JENNIFER      Salpingo Oophorectomy, RT/LT/JENNIFER     ZZC EXCIS PTERYGIUM  10/08    [facility]     ZZC STOMACH SURGERY PROCEDURE UNLISTED       ZZHC COLONOSCOPY THRU STOMA, DIAGNOSTIC  2002    normal per report-no records available-records destroyed       Allergies:  Allergies    Allergen Reactions     Morphine Sulfate Itching       Current Medications   Current Outpatient Medications   Medication     ACETAMINOPHEN EXTRA STRENGTH 500 MG tablet     ARIPiprazole (ABILIFY) 15 MG tablet     blood glucose (ACCU-CHEK CELIA PLUS) test strip     blood glucose (NO BRAND SPECIFIED) test strip     blood glucose monitoring (ACCU-CHEK CELIA PLUS) meter device kit     blood glucose monitoring (SOFTCLIX) lancets     buPROPion (WELLBUTRIN XL) 150 MG 24 hr tablet     busPIRone HCl (BUSPAR) 30 MG tablet     celecoxib (CELEBREX) 100 MG capsule     cetirizine (ZYRTEC) 10 MG tablet     Continuous Blood Gluc Sensor (FREESTYLE CM 14 DAY SENSOR) [facility] AD[facility] LOW DOSE 81 MG chewable tablet     ferrous gluconate (FERGON) 324 (38 Fe) MG tablet     gabapentin (NEURONTIN) 600 MG tablet     glimepiride (AMARYL) 4 MG tablet     ibuprofen (ADVIL/MOTRIN) 200 MG tablet     insulin glargine (LANTUS SOLOSTAR) 100 UNIT/ML pen     insulin pen needle (31G X 5 MM) 31G X 5 MM miscellaneous     Lancets (ONETOUCH DELICA PLUS [id number]) MISC     metFORMIN (GLUCOPHAGE XR) 500 MG 24 hr tablet     metFORMIN (GLUCOPHAGE) 1000 MG tablet     Multiple Vitamin (DAILY-ISAAC MULTIVITAMIN) TABS     naloxone (NARCAN) 4 MG/0.1ML nasal spray     nystatin (MYCOSTATIN) 326338 UNIT/GM external powder     omeprazole (PRILOSEC) 20 MG DR capsule     order for DME     order for DME     order for DME     order for DME     ORDER FOR DME     ORDER FOR DME     PARoxetine (PAXIL) 20 MG tablet     pioglitazone (ACTOS) 45 MG tablet     propranolol ER (INDERAL LA) 80 MG 24 hr capsule     ramipril (ALTACE) 5 MG capsule     rOPINIRole (REQUIP) 0.25 MG tablet     simvastatin (ZOCOR) 20 MG tablet     traMADol (ULTRAM) 50 MG tablet     traZODone (DESYREL) 50 MG tablet     UNIVERSAL REMOVER WIPES EX MISC     vitamin C ([employer]S VITAMIN C) 500 MG tablet     VITAMIN D3 50 MCG (2000 UT) tablet     Current Facility-Administered Medications   Medication      lidocaine 1 % injection 2 mL     triamcinolone (KENALOG-40) injection 40 mg     triamcinolone (KENALOG-40) injection 40 mg     triamcinolone (KENALOG-40) injection 40 mg       Family History:  Family History   Problem Relation Age of Onset     Diabetes Mother      Cerebrovascular Disease Mother      Diabetes Father      Hypertension Father      Cancer Maternal Grandfather      Cancer Paternal Grandfather      Diabetes Brother      Diabetes Sister      Thyroid Disease Daughter      Thyroid Disease Sister      Multiple Sclerosis Daughter      Glaucoma No family hx of      Macular Degeneration No family hx of        Social History:  Social History     Tobacco Use     Smoking status: Former     Packs/day: 0.00     Types: Cigarettes     Start date: 1974     Quit date: 1975     Years since quittin.4     Smokeless tobacco: Never   Substance Use Topics     Alcohol use: Yes     Alcohol/week: 0.0 standard drinks     Comment: social occasions   taking care of 11 and 18 year grand children and live togheter.     ROS:  Full review of systems taken with the help of the intake sheet. Otherwise a complete 14 point review of systems was taken and is negative unless stated in the history above.    Physical Exam:   not currently breastfeeding.  General: well appearing, no acute distress, pleasant and conversant,   Mental Status/neuro: alert and oriented  Face: symmetrical, normal facial color  Eyes: anicteric, no proptosis or lid lag  Resp: no acute distress        Labs (General):      Ref. Range 2020 14:23 2020 00:00 2021 09:32 10/8/2021 09:22 2021 00:00   Hemoglobin A1C Latest Ref Range: 0.0 - 5.7 % 10.6 (H) 7.9 (A) 7.7 (H) 10.5 (H) 8.9 (A)       Elizabeth Medrano MD

## 2023-01-05 ENCOUNTER — TELEPHONE (OUTPATIENT)
Dept: ENDOCRINOLOGY | Facility: CLINIC | Age: 68
End: 2023-01-05
Payer: COMMERCIAL

## 2023-01-05 DIAGNOSIS — Z79.4 TYPE 2 DIABETES MELLITUS WITHOUT COMPLICATION, WITH LONG-TERM CURRENT USE OF INSULIN (H): Primary | ICD-10-CM

## 2023-01-05 DIAGNOSIS — E11.9 TYPE 2 DIABETES MELLITUS WITHOUT COMPLICATION, WITH LONG-TERM CURRENT USE OF INSULIN (H): Primary | ICD-10-CM

## 2023-01-05 NOTE — PROGRESS NOTES
Dr. Medrano with endocrinology approved the following:  - Accu-chek Verónica Plus testing supplies  - FreeStyle stan continuous glucose meter  - switching metformin to the extended-release formulation. If metformin is switched to the extended release formulation, then will need to take 2 tablets (500 mg) by mouth twice daily. Verbally confirmed this with patient and daughter. Cata reports that she plans to check with pharmacy on copay of metformin XR to ensure it is affordable. If not affordable, then will continue with IR formulation. She plans to let endo know what they decide.     Cata would like to enroll Holly in diabetes education classes to focus on diet/portion control. Rosana to follow-up with endo to determine if classes are available or if CDE referral is needed.     Holly to follow-up with [facility] pharmacist, Aurelia ROCA, going forward.     Rosana Fulton, PharmD   Medication Therapy Management   Cystic Fibrosis Pharmacist

## 2023-01-05 NOTE — TELEPHONE ENCOUNTER
Patient requests referral to meet with CDE to discuss diet.    Rosana Fulton, PharmD   Medication Therapy Management   Cystic Fibrosis Pharmacist

## 2023-01-09 DIAGNOSIS — M25.562 ARTHRALGIA OF BOTH KNEES: ICD-10-CM

## 2023-01-09 DIAGNOSIS — M17.10 UNILATERAL PRIMARY OSTEOARTHRITIS, UNSPECIFIED KNEE: ICD-10-CM

## 2023-01-09 DIAGNOSIS — J30.2 SEASONAL ALLERGIC RHINITIS, UNSPECIFIED TRIGGER: ICD-10-CM

## 2023-01-09 DIAGNOSIS — M25.561 CHRONIC PAIN OF RIGHT KNEE: ICD-10-CM

## 2023-01-09 DIAGNOSIS — M25.561 ARTHRALGIA OF BOTH KNEES: ICD-10-CM

## 2023-01-09 DIAGNOSIS — G89.29 CHRONIC PAIN OF RIGHT KNEE: ICD-10-CM

## 2023-01-10 NOTE — TELEPHONE ENCOUNTER
Holly Muir's daughter wants to know more about diabetic foods and nutrition counseling.  Diabetes education referral submitted.  I will route to Serena PRUTIT to reach out to patient and daughter for follow up appointment.    Roseline Shukla RN, [facility]

## 2023-01-11 RX ORDER — PSEUDOEPHED/ACETAMINOPH/DIPHEN 30MG-500MG
TABLET ORAL
Qty: 180 TABLET | Refills: 0 | Status: SHIPPED | OUTPATIENT
Start: 2023-01-11 | End: 2023-02-08

## 2023-01-11 RX ORDER — CETIRIZINE HYDROCHLORIDE 10 MG/1
TABLET ORAL
Qty: 90 TABLET | Refills: 1 | Status: SHIPPED | OUTPATIENT
Start: 2023-01-11 | End: 2023-08-22

## 2023-01-11 RX ORDER — TRAMADOL HYDROCHLORIDE 50 MG/1
50 TABLET ORAL 2 TIMES DAILY PRN
Qty: 30 TABLET | Refills: 0 | Status: SHIPPED | OUTPATIENT
Start: 2023-01-11 | End: 2023-02-07

## 2023-01-12 ENCOUNTER — VIRTUAL VISIT (OUTPATIENT)
Dept: FAMILY MEDICINE | Facility: CLINIC | Age: 68
End: 2023-01-12
Payer: COMMERCIAL

## 2023-01-12 DIAGNOSIS — E11.65 TYPE 2 DIABETES MELLITUS WITH HYPERGLYCEMIA, WITH LONG-TERM CURRENT USE OF INSULIN (H): ICD-10-CM

## 2023-01-12 DIAGNOSIS — M46.96 UNSPECIFIED INFLAMMATORY SPONDYLOPATHY, LUMBAR REGION (H): ICD-10-CM

## 2023-01-12 DIAGNOSIS — F25.9 SCHIZOAFFECTIVE DISORDER, UNSPECIFIED TYPE (H): ICD-10-CM

## 2023-01-12 DIAGNOSIS — E66.01 MORBID OBESITY (H): ICD-10-CM

## 2023-01-12 DIAGNOSIS — Z93.3 S/P COLOSTOMY (H): ICD-10-CM

## 2023-01-12 DIAGNOSIS — Z79.4 TYPE 2 DIABETES MELLITUS WITH HYPERGLYCEMIA, WITH LONG-TERM CURRENT USE OF INSULIN (H): ICD-10-CM

## 2023-01-12 DIAGNOSIS — N18.31 CHRONIC KIDNEY DISEASE, STAGE 3A (H): ICD-10-CM

## 2023-01-12 DIAGNOSIS — M25.561 CHRONIC PAIN OF RIGHT KNEE: Primary | ICD-10-CM

## 2023-01-12 DIAGNOSIS — G89.29 CHRONIC PAIN OF RIGHT KNEE: Primary | ICD-10-CM

## 2023-01-12 PROCEDURE — 99214 OFFICE O/P EST MOD 30 MIN: CPT | Mod: 95 | Performed by: INTERNAL MEDICINE

## 2023-01-12 ASSESSMENT — PATIENT HEALTH QUESTIONNAIRE - PHQ9
SUM OF ALL RESPONSES TO PHQ QUESTIONS 1-9: 4
SUM OF ALL RESPONSES TO PHQ QUESTIONS 1-9: 4
10. IF YOU CHECKED OFF ANY PROBLEMS, HOW DIFFICULT HAVE THESE PROBLEMS MADE IT FOR YOU TO DO YOUR WORK, TAKE CARE OF THINGS AT HOME, OR GET ALONG WITH OTHER PEOPLE: NOT DIFFICULT AT ALL

## 2023-01-12 NOTE — PROGRESS NOTES
Holly is a 67 year old who is being evaluated via a billable telephone visit.      What phone number would you like to be contacted at? [phone]   How would you like to obtain your AVS? Dinesh  Distant Location (provider location):  Off-site    Assessment & Plan       Holly was seen today for pain.    Diagnoses and all orders for this visit:    Chronic pain of right knee  Comments:  See AVS for pain level and pain medication use.    S/P colostomy (H)    Schizoaffective disorder, unspecified type (H)  Comments:  Follow-up with psychiatry    Unspecified inflammatory spondylopathy, lumbar region (H)  Comments:  Follows with sports medicine for back pain    Type 2 diabetes mellitus with hyperglycemia, with long-term current use of insulin (H)  Comments:  Follows with the endocrine    Morbid obesity (H)  Comments:  Limited ability to lose weight    Chronic kidney disease, stage 3a (H)  Comments:  Avoid NSAIDs      Patient is taking all her medications scheduled regardless of pain level.  When she was seen by orthopedist few days ago, she did not have much pain reported at that visit.  Patient reported pain level was improved after injection.  I recommend patient use a pain scale to determine medication use rather than taking her pain medication at a scheduled time.  I printed a pain scale and medication to use.  I recommend patient avoid ibuprofen due to CKD.    Return in about 1 week (around 1/19/2023) for Fasting Lab appointment.    Tiffany King MD PhD  [facility]   Holly is a 67 year old, presenting for the following health issues:  Pain      History of Present Illness       Reason for visit:  Refill on tramadol for pain    She eats 2-3 servings of fruits and vegetables daily.She consumes 1 sweetened beverage(s) daily.She exercises with enough effort to increase her heart rate 10 to 19 minutes per day.  She exercises with enough effort to increase her heart rate 5 days per week.    She is taking medications regularly.    Today's PHQ-9         PHQ-9 Total Score: 4    PHQ-9 Q9 Thoughts of better off dead/self-harm past 2 weeks :   Not at all    How difficult have these problems made it for you to do your work, take care of things at home, or get along with other people: Not difficult at all       The right knee is swollen and puffy. She woke up one day with excruciating pain and went to [facility] ER on 11/30/2022.  Followed up with sports medicine, did an injection. It helped for 2 weeks and then started acting up. She saw orthopedics Dr. Sheehan on 1/3/2023. There is plan for an MRI for the right knee.     Taking ibuprofen 200 mg (4-5 tab) at 8 AM and 8 PM and tylenol 1000 mg 6 AM in the morning, afternoon and before she goes to bed about 9 PM.  Uses Tramadol at 8 AM in the morning and 8 PM at night. If it acts up really bad, then one in the afternoon.     When she saw Dr. Sheehan, the pain was not bad. Her exam per note, benign.  Pt feels the pain has calm down some.     Review of Systems   Constitutional, HEENT, cardiovascular, pulmonary, gi and gu systems are negative, except as otherwise noted.      Objective           Vitals:  No vitals were obtained today due to virtual visit.    Physical Exam   healthy, alert and no distress  PSYCH: Alert and oriented times 3; coherent speech, normal   rate and volume, able to articulate logical thoughts, able   to abstract reason, no tangential thoughts, no hallucinations   or delusions  Her affect is normal  RESP: No cough, no audible wheezing, able to talk in full sentences  Remainder of exam unable to be completed due to telephone visits            Phone call duration: 22 minutes

## 2023-01-12 NOTE — PATIENT INSTRUCTIONS
Additional instructions:  For knee pain, use Tramadol on an as needed basis when the pain is moderate to severe.     Pain management    Pain level and pain medication:  0-3/10: no medication, normal activities, avoid activities that causes pain.   4-5/10: Tylenol 500 three times a day as needed.  6-7/10: Tylenol 1000 mg three times a day as needed  8-10/10: Tramadol 50 mg up to three times a day as needed.     Avoid ibuprofen due to kidney disease.

## 2023-01-16 ENCOUNTER — PATIENT OUTREACH (OUTPATIENT)
Dept: GERIATRIC MEDICINE | Facility: CLINIC | Age: 68
End: 2023-01-16

## 2023-01-16 NOTE — PROGRESS NOTES
Encounter opened due to Regulatory [employer] Update to close FVP Program.    Liz Hendricks  Care Management Specialist   [facility]   [phone]

## 2023-01-16 NOTE — PROGRESS NOTES
Encounter opened due to Regulatory [employer] Update to open FVP Program.    Liz Hendricks  Care Management Specialist   [facility]   [phone]

## 2023-01-19 ENCOUNTER — LAB (OUTPATIENT)
Dept: LAB | Facility: CLINIC | Age: 68
End: 2023-01-19
Payer: COMMERCIAL

## 2023-01-19 DIAGNOSIS — E11.649 UNCONTROLLED TYPE 2 DIABETES MELLITUS WITH HYPOGLYCEMIA WITHOUT COMA (H): ICD-10-CM

## 2023-01-19 DIAGNOSIS — E78.5 HYPERLIPIDEMIA LDL GOAL <100: ICD-10-CM

## 2023-01-19 DIAGNOSIS — I10 HTN, GOAL BELOW 140/90: ICD-10-CM

## 2023-01-19 DIAGNOSIS — G89.4 CHRONIC PAIN SYNDROME: ICD-10-CM

## 2023-01-19 DIAGNOSIS — F11.20 CONTINUOUS OPIOID DEPENDENCE (H): ICD-10-CM

## 2023-01-19 LAB
ALBUMIN SERPL-MCNC: 3.6 G/DL (ref 3.4–5)
ALP SERPL-CCNC: 70 U/L (ref 40–150)
ALT SERPL W P-5'-P-CCNC: 20 U/L (ref 0–50)
ANION GAP SERPL CALCULATED.3IONS-SCNC: 5 MMOL/L (ref 3–14)
AST SERPL W P-5'-P-CCNC: 10 U/L (ref 0–45)
BILIRUB SERPL-MCNC: 0.2 MG/DL (ref 0.2–1.3)
BUN SERPL-MCNC: 15 MG/DL (ref 7–30)
CALCIUM SERPL-MCNC: 9.3 MG/DL (ref 8.5–10.1)
CHLORIDE BLD-SCNC: 99 MMOL/L (ref 94–109)
CHOLEST SERPL-MCNC: 84 MG/DL
CO2 SERPL-SCNC: 30 MMOL/L (ref 20–32)
CREAT SERPL-MCNC: 0.78 MG/DL (ref 0.52–1.04)
ERYTHROCYTE [DISTWIDTH] IN BLOOD BY AUTOMATED COUNT: 13.9 % (ref 10–15)
FASTING STATUS PATIENT QL REPORTED: YES
GFR SERPL CREATININE-BSD FRML MDRD: 83 ML/MIN/1.73M2
GLUCOSE BLD-MCNC: 78 MG/DL (ref 70–99)
HBA1C MFR BLD: 9.1 % (ref 0–5.6)
HCT VFR BLD AUTO: 31.7 % (ref 35–47)
HDLC SERPL-MCNC: 51 MG/DL
HGB BLD-MCNC: 10 G/DL (ref 11.7–15.7)
LDLC SERPL CALC-MCNC: 16 MG/DL
MCH RBC QN AUTO: 28.4 PG (ref 26.5–33)
MCHC RBC AUTO-ENTMCNC: 31.5 G/DL (ref 31.5–36.5)
MCV RBC AUTO: 90 FL (ref 78–100)
NONHDLC SERPL-MCNC: 33 MG/DL
PLATELET # BLD AUTO: 341 10E3/UL (ref 150–450)
POTASSIUM BLD-SCNC: 4.5 MMOL/L (ref 3.4–5.3)
PROT SERPL-MCNC: 7.3 G/DL (ref 6.8–8.8)
RBC # BLD AUTO: 3.52 10E6/UL (ref 3.8–5.2)
SODIUM SERPL-SCNC: 134 MMOL/L (ref 133–144)
TRIGL SERPL-MCNC: 83 MG/DL
WBC # BLD AUTO: 7.7 10E3/UL (ref 4–11)

## 2023-01-19 PROCEDURE — 36415 COLL VENOUS BLD VENIPUNCTURE: CPT

## 2023-01-19 PROCEDURE — 80061 LIPID PANEL: CPT

## 2023-01-19 PROCEDURE — 83036 HEMOGLOBIN GLYCOSYLATED A1C: CPT

## 2023-01-19 PROCEDURE — 85027 COMPLETE CBC AUTOMATED: CPT

## 2023-01-19 PROCEDURE — 80053 COMPREHEN METABOLIC PANEL: CPT

## 2023-01-19 NOTE — RESULT ENCOUNTER NOTE
Dear Holly,   Your recent test results showed the following:  -- A1c is higher, hemoglobin is down slightly.   -- Are you still taking iron supplement? Have you noticed any blood in the stool or having excessive nose bleeds?   -- I will forward the A1c to Dr. Medrano and endocrine nurse Argelia. You have an appointment with Argelia on 1/24/2023.     Please call or Mychart to our office if you have further questions.     Tiffany King MD-PhD

## 2023-01-20 ENCOUNTER — HOSPITAL ENCOUNTER (OUTPATIENT)
Dept: GENERAL RADIOLOGY | Facility: CLINIC | Age: 68
Discharge: HOME | End: 2023-01-20
Attending: ANESTHESIOLOGY
Payer: COMMERCIAL

## 2023-01-20 ENCOUNTER — HOSPITAL ENCOUNTER (OUTPATIENT)
Facility: CLINIC | Age: 68
Discharge: HOME | End: 2023-01-20
Attending: ANESTHESIOLOGY | Admitting: ANESTHESIOLOGY
Payer: COMMERCIAL

## 2023-01-20 VITALS
BODY MASS INDEX: 36.58 KG/M2 | WEIGHT: 200 LBS | OXYGEN SATURATION: 98 % | HEART RATE: 71 BPM | SYSTOLIC BLOOD PRESSURE: 129 MMHG | TEMPERATURE: 98.4 F | RESPIRATION RATE: 16 BRPM | DIASTOLIC BLOOD PRESSURE: 64 MMHG

## 2023-01-20 DIAGNOSIS — M54.16 LUMBAR RADICULOPATHY: ICD-10-CM

## 2023-01-20 PROCEDURE — 64483 NJX AA&/STRD TFRM EPI L/S 1: CPT | Performed by: ANESTHESIOLOGY

## 2023-01-20 PROCEDURE — 250N000011 HC RX IP 250 OP 636: Performed by: ANESTHESIOLOGY

## 2023-01-20 PROCEDURE — 64483 NJX AA&/STRD TFRM EPI L/S 1: CPT | Mod: RT | Performed by: ANESTHESIOLOGY

## 2023-01-20 PROCEDURE — 999N000179 XR SURGERY CARM FLUORO LESS THAN 5 MIN W STILLS: Mod: TC

## 2023-01-20 RX ORDER — IOPAMIDOL 612 MG/ML
INJECTION, SOLUTION INTRATHECAL PRN
Status: DISCONTINUED | OUTPATIENT
Start: 2023-01-20 | End: 2023-01-20 | Stop reason: HOSPADM

## 2023-01-20 RX ORDER — TRIAMCINOLONE ACETONIDE 40 MG/ML
INJECTION, SUSPENSION INTRA-ARTICULAR; INTRAMUSCULAR PRN
Status: DISCONTINUED | OUTPATIENT
Start: 2023-01-20 | End: 2023-01-20 | Stop reason: HOSPADM

## 2023-01-20 NOTE — OP NOTE
PRIMARY PROBLEM: Low back pain and rightlower extremity radicular pains    INDICATIONS FOR PROCEDURE:   1.This patient suffers from moderate to severe low back pain and lower extremity radicular pains.    2.This pain has persisted for more than 4 weeks and is causing significant functional disability when they are trying to perform ADL's.   3. They failed conservative care which consisted of giving this pain time to adore, meds, and PT   4. Preoperative NRS pain score was verbally reported to me today as a 7 /10.   5. The patients radicular pains correlates to their MRI which shows a pars defect at the right L5 level causing a spondylolisthesis at L5-S1 and foraminal stenosis causing right leg pain and right buttock pain  6.  An order was sent to me to perform the technical component of a lumbar epidural steroid injection.    PROCEDURE: Right L5-S1  Transforaminal Epidural Steroid Injection with fluoroscopic guidance and contrast.     PROCEDURE DETAILS: After written informed consent was obtained from the patient, the patient was escorted to the procedure room.  The patient was placed in the prone position.  A  time out  was conducted to verify patient identity, procedure to be performed, side, site, allergies and any special requirements.  The skin over the thoracolumbar region was prepped and draped in normal sterile fashion with ChloraPrep. Fluoroscopy was used to identify the neural foramen in AP view and the skin was anesthetized with 2 mL of 1% lidocaine with bicarbonate buffer. A 25-gauge Quincke spinal needle was advanced through this location and advanced under fluoroscopic guidance towards the neural foramen.  The target zone was the 6 o clock position of the pedicle.   Prior to entering the foramen, the depth of the needle was gauged with a lateral view on fluoroscopy. While still in a lateral view, the needle was slowly advanced to avoid injury to the spinal nerve.  Then, in the oblique view  (approximately 28 degrees), after negative aspiration, 1.5 mL of Omnipaque contrast dye was injected revealing epidural spread without evidence of intravascular or intrathecal spread.  Then a 3cc solution of 40 mg of Triamcinolone in 2 mL of  Preservative-Free saline was slowly injected into the epidural space at each segment.  There is good flow of medication covering the L5-S1 disc margin as well as the right L5 nerve root and right S1 nerve root.  After injection of the medication, as the needle tip was withdrawn, it was flushed with local anesthetic.   The patient was monitored with blood pressure and pulse oximetry machines with the assistance of an RN throughout the procedure.  The patient was alert and responsive to questions throughout the procedure.   The patient tolerated the procedure well and was observed in the post-procedural area.  The patient was dismissed without apparent complications.     BLOOD LOSS: < 5 cc    DIAGNOSIS:  1.  Right lumbar radiculopathy secondary to a spondylolisthesis at L5-S1 causing right L5-S1 foraminal stenosis    PLAN:  1. Performed a technically successful right L5-S1  transforaminal epidural steroid injection.  2. The patient was instructed to follow-up with the referring provider and discharge instructions were given for post-operative care.      Kulwinder Booth MD  Diplomate of the American Board of Anesthesiology, Pain Medicine

## 2023-01-20 NOTE — DISCHARGE INSTRUCTIONS

## 2023-01-25 ENCOUNTER — ANCILLARY PROCEDURE (OUTPATIENT)
Dept: MAMMOGRAPHY | Facility: CLINIC | Age: 68
End: 2023-01-25
Attending: STUDENT IN AN ORGANIZED HEALTH CARE EDUCATION/TRAINING PROGRAM
Payer: COMMERCIAL

## 2023-01-25 ENCOUNTER — ANCILLARY ORDERS (OUTPATIENT)
Dept: FAMILY MEDICINE | Facility: CLINIC | Age: 68
End: 2023-01-25

## 2023-01-25 DIAGNOSIS — Z12.31 VISIT FOR SCREENING MAMMOGRAM: ICD-10-CM

## 2023-01-25 PROCEDURE — 77067 SCR MAMMO BI INCL CAD: CPT | Mod: GC | Performed by: STUDENT IN AN ORGANIZED HEALTH CARE EDUCATION/TRAINING PROGRAM

## 2023-01-25 PROCEDURE — 77063 BREAST TOMOSYNTHESIS BI: CPT | Mod: GC | Performed by: STUDENT IN AN ORGANIZED HEALTH CARE EDUCATION/TRAINING PROGRAM

## 2023-01-26 DIAGNOSIS — F33.3 SEVERE RECURRENT MAJOR DEPRESSIVE DISORDER WITH PSYCHOTIC FEATURES (H): ICD-10-CM

## 2023-01-27 DIAGNOSIS — Z79.4 TYPE 2 DIABETES MELLITUS WITH HYPERGLYCEMIA, WITH LONG-TERM CURRENT USE OF INSULIN (H): ICD-10-CM

## 2023-01-27 DIAGNOSIS — E11.65 TYPE 2 DIABETES MELLITUS WITH HYPERGLYCEMIA, WITH LONG-TERM CURRENT USE OF INSULIN (H): ICD-10-CM

## 2023-01-27 RX ORDER — CHOLECALCIFEROL (VITAMIN D3) 25 MCG
TABLET ORAL
Qty: 90 TABLET | Refills: 3 | Status: SHIPPED | OUTPATIENT
Start: 2023-01-27 | End: 2024-01-19

## 2023-01-31 ENCOUNTER — TELEPHONE (OUTPATIENT)
Dept: ORTHOPEDICS | Facility: CLINIC | Age: 68
End: 2023-01-31

## 2023-01-31 RX ORDER — METFORMIN HCL 500 MG
TABLET, EXTENDED RELEASE 24 HR ORAL
Qty: 120 TABLET | Refills: 11 | OUTPATIENT
Start: 2023-01-31

## 2023-01-31 NOTE — TELEPHONE ENCOUNTER
Spoke with Dr. Sheehan for potential alternatives. With no alternative test available to assess for a crystalline issue/disorder, Dr. Sheehan stated to have Pt reach out to a Sports Med provider for aspiration if pain returns at high level.     Called Pt and left VM to let them know about results and alternative available.     Martinez JESUS ATC         ----- Message from Bharathi Elias sent at 1/31/2023 11:42 AM CST -----  Regarding: Knee MRI  GuerlineHolly is not able to have a knee MRI due to her Medtronic stimulator LEAD INTERSTIM KIT 3889-28. This Lead has not been approved by the FDA for any scan other than a head scan.     Holly would like someone to reach out to her with options.    Thank you!    Bharathi DONNELLY  [phone]

## 2023-02-01 ENCOUNTER — ALLIED HEALTH/NURSE VISIT (OUTPATIENT)
Dept: EDUCATION SERVICES | Facility: CLINIC | Age: 68
End: 2023-02-01
Payer: COMMERCIAL

## 2023-02-01 DIAGNOSIS — E11.40 TYPE 2 DIABETES MELLITUS WITH DIABETIC NEUROPATHY (H): ICD-10-CM

## 2023-02-01 DIAGNOSIS — Z79.4 TYPE 2 DIABETES MELLITUS WITHOUT COMPLICATION, WITH LONG-TERM CURRENT USE OF INSULIN (H): ICD-10-CM

## 2023-02-01 DIAGNOSIS — E11.65 TYPE 2 DIABETES MELLITUS WITH HYPERGLYCEMIA, WITH LONG-TERM CURRENT USE OF INSULIN (H): Primary | ICD-10-CM

## 2023-02-01 DIAGNOSIS — E11.9 TYPE 2 DIABETES MELLITUS WITHOUT COMPLICATION, WITH LONG-TERM CURRENT USE OF INSULIN (H): ICD-10-CM

## 2023-02-01 DIAGNOSIS — Z79.4 TYPE 2 DIABETES MELLITUS WITH HYPERGLYCEMIA, WITH LONG-TERM CURRENT USE OF INSULIN (H): Primary | ICD-10-CM

## 2023-02-01 PROCEDURE — G0108 DIAB MANAGE TRN  PER INDIV: HCPCS

## 2023-02-01 RX ORDER — GLIMEPIRIDE 2 MG/1
2 TABLET ORAL
Qty: 90 TABLET | Refills: 3 | COMMUNITY
Start: 2023-02-01 | End: 2023-02-16

## 2023-02-01 RX ORDER — METFORMIN HCL 500 MG
TABLET, EXTENDED RELEASE 24 HR ORAL
Qty: 120 TABLET | Refills: 11 | COMMUNITY
Start: 2023-02-01 | End: 2023-03-28

## 2023-02-01 RX ORDER — INSULIN GLARGINE 100 [IU]/ML
INJECTION, SOLUTION SUBCUTANEOUS
Qty: 45 ML | Refills: 1 | COMMUNITY
Start: 2023-02-01 | End: 2023-03-07

## 2023-02-01 NOTE — PATIENT INSTRUCTIONS
PLAN:  *Reduce Lantus to 42 units in evening  *Glimepiride 2 mg (1 tablet) with breakfast and dinner. Do not take if you aren't going to eat a meal  *Reduce Metformin 500 mg (1 tablet) with breakfast and 1 tablet with dinner  *Write note to remind you to check blood sugar and take medications and place at kitchen table  *Follow up with Argelia in 2 weeks 2/16 at 10:00 AM by phone  *Call if having consistent lows below 70    Argelia Maciel RN, Diabetes Educator  Diabetes Education Department  [facility] [place]  [phone]

## 2023-02-01 NOTE — PROGRESS NOTES
Diabetes Self-Management Education & Support  Holly Muri presents today for education related to Type 2 diabetes    Patient is being treated with:  oral agents and insulin  She is accompanied by self    Year of diagnosis: 2000  Referring provider:  Dr. Medrano  Living Situation: Lives with daughter in home  Employment: No    PATIENT CONCERNS RELATED TO DIABETES SELF MANAGEMENT: High blood sugars    ASSESSMENT:    Taking Medication:     Current Diabetes Management per Patient:  Taking diabetes medications? Yes    Monitoring  Patient glucose self monitoring as follows: continuously using a continuous glucose monitor (CGM)  BG meter: Freestyle Mathieu 2-[employer] Brandi, connected.  meter  BG results:        Patient's most recent   Lab Results   Component Value Date    A1C 9.1 01/19/2023    A1C 8.9 11/12/2021      Patient's A1C goal: <7.0    Activity: Future Physical therapy for back and knee pain. No regular program    Healthy Eating:   Patient currently eats 3 meals 2-3 snacks per day   Breakfast 7-8 am: Oatmeal with nuts, strawberry, apple. Water  Lunch: Weinert, Mexican dish, water  Dinner 6-7 PM: Chicken, green beans, carrots, salad, rice-not often, water, diet soda  Snacks:Pisatachios, Skinny popcorn, Sweet potato chips  Water throughout day  Alcohol: Rare    Sleeping Pattern: Bed at 9 pm, up between 5-6 am    Problem Solving:    Patient is at risk of hypoglycemia?: Frequency is one to two times per week, Feels symptoms when glucose is less than 60 and Usual treatment is Orange juice or orange  Hospitalizations for hyper or hypoglycemia: No    Healthy Coping and Stress Management:   Sources of stress identified by patient  Other (please specify):  None  Coping mechanisms identified by patient:  Other (please specify):  Shopping with friends, movies, out to eat      EDUCATION and INSTRUCTION PROVIDED AT THIS VISIT:    In Person Visit for review of blood sugar data at the request of Dr. Medrano. T2 diagnosed in 2000.  Steroid injection for back completed last week, noticed increase in blood sugars.Also receives steroid injection for right knee pain every 3 months; last injection unknown. Mathieu 2 data TIR 30%, TAR >250=37%. Since increasing Lantus dose she is nearing <70 during night. Pattern of high glucose in evening; will add glimepiride 2 mg with dinner back into regimen.  Patient contributes elevated numbers to knee pain and eating late. She increased Lantus to 45 units at 9 pm this past Sunday. Takes Glimepiride 2 mg with breakfast, Metformin 1000 mg BID, and Actos 45 mg. Reports diarrhea every day; declined reduction in Metformin dose at 1/4 visit with Dr. Medrano. Is willing to reduce dose today to 1000 mg daily to see if diarrhea improves. Does not miss doses with reminders from daughters. Patient verbalized frustration with constant hovering from daughters to check BG, take medicine, limits treats, etc. Reviewed long term complications from persistent high blood sugars. Patient reports she used to be on Novolog for meal coverage but blood sugars would drop. She see's Nutrition on Friday. Will follow up in 2 weeks after adjustments.    Patient-stated goal written and given to Holly Muir.  Verbalized and demonstrated understanding of instructions.     PLAN:  *Reduce Lantus to 42 units in evening  *Glimepiride 2 mg (1 tablet) with breakfast and dinner. Do not take if you aren't going to eat a meal  *Reduce Metformin 500 mg (1 tablet) with breakfast and 1 tablet with dinner  *Write note to remind you to check blood sugar and take medications and place at kitchen table  *Follow up with Argelia in 2 weeks 2/16 at 10:00 AM by phone  *Call if having consistent lows below 70    See patient instructions  AVS printed and given to patient    FOLLOW-UP:    *2/16/2023 by phone with Argelia    Time spent with patient at today's visit was 60 minutes.      Any diabetes medication dose changes were made via the CDE Protocol and  Collaborative Practice Agreement with [facility] and  [facility].  A copy of this encounter was provided to patient's referring provider- Dr. Medrano

## 2023-02-03 ENCOUNTER — VIRTUAL VISIT (OUTPATIENT)
Dept: NUTRITION | Facility: CLINIC | Age: 68
End: 2023-02-03
Payer: COMMERCIAL

## 2023-02-03 DIAGNOSIS — E11.9 TYPE 2 DIABETES MELLITUS WITHOUT COMPLICATION, WITH LONG-TERM CURRENT USE OF INSULIN (H): Primary | ICD-10-CM

## 2023-02-03 DIAGNOSIS — E66.01 MORBID OBESITY (H): ICD-10-CM

## 2023-02-03 DIAGNOSIS — Z79.4 TYPE 2 DIABETES MELLITUS WITHOUT COMPLICATION, WITH LONG-TERM CURRENT USE OF INSULIN (H): Primary | ICD-10-CM

## 2023-02-03 PROCEDURE — 97802 MEDICAL NUTRITION INDIV IN: CPT | Mod: 93 | Performed by: DIETITIAN, REGISTERED

## 2023-02-03 NOTE — PATIENT INSTRUCTIONS
NUTRITION INTERVENTION:   Nutrition Education (Application):  Cardiometabolic Food Plan Materials by [employer]     Discussed healthy eating patters, portion sizes for healthy fats and proteins and carbs    Reviewed and planned out some recipes for breakfast and snacks that included protein and healthy fats     Long Term Goals:   Goal: Decrease fasting blood sugars to 70-99 mg/dL within 1-6 months  Goal: Decrease Hemoglobin A1c <7 % within 2-6 months   Goal: Lose 20-24lbs in 6 months, recommend average 1-2lbs per week of weight loss.   Goal: Increase protein intake to help normalize HGB levels to 11.7-15.7 g/dL         Short Term Goals:  Goal 1: Focus on having a healthy balanced breakfast daily for the next six weeks. - focus on lean proteins 2 serving 12g protein  Incorporate a whole grain at breakfast ex continue oatmeal naturally gluten free with more healthy fats such as handful of walnuts and 1 fruit serving 15g of carbs such as blueberries. Focus on adding in a lean protein as side such as 2 eggs, 2 turkey or chicken sausage.    Try a smoothie 1-2x a week for breakfast for the next six weeks see recipe discussed. add in unsweetened flax, hemp or coconut milk, 1 tbsp of healthy fat such as flax seed ground or hawa seed ground. serving of fruit 1 cup of berries. Veggie (optional), 1 scoop of plant based protein powder or collagen powder, 1 serving of fruit and veggie such as kale, spinach, or veggie powder even spirulina     Avocado on slice of gluten free bread recommend base culture brand      [email or web address]     Birch mobley brand for pancakes - paleo  [email or web address]://Dou[employer] Alley/collections/pancake-waffle-mix/products/paleo     Try siete soft taco shells with eggs and veggies in am for breakfast    https://[email or web address]/collections/tortillas/products/bgoaqz-ldwhh-tifsoslbj-6-pack    Incorporate protein powder daily with smoothie:  Plant based hemp (recommended brands: [place], Nutiva, Just Hemp Protein, Guides.co [employer] Mill)    Plant based pea (recommended brands: Naked Pea, Now Sports). If you want to try a combo of pea and hemp the brand Dawson in vanilla or chocolate is a great option.   or collagen peptides -collagen can be used for smoothies, oatmeal and soups. This can help provide essential amino acids and minerals that heal your gut as well as balance blood sugars. A great option if you have a hard time tolerating solids.     Can also consider pre made shakes if unable to make smoothies.      Brand examples that are gluten and dairy free to try:   1) Orgain Vegan Protein Shakes, 20g of Plant Based Protein, Creamy Chocolate - Gluten Free, No Dairy, Soy, or Preservatives, No Added Sugar  2) Pirq, Vegan Protein Shake, Turmeric Curcumin, Perri, Plant-Based Protein Drink, Gluten-Free, Dairy-Free, Soy-Free, Non-GMO, Vegetarian, Kosher, Keto, Low Carb, Low Calorie  3) OWYN Pro Elite Vegan Plant-Based High Protein Shake, 35g Protein, 9 Amino Acids, Omega-3, Prebiotics, Superfoods Greens for Workout and Recovery, 0g Net Carbs, Zero Sugar, Keto  4) Ripple Vegan Protein Shake  Chocolate  20g Nutritious Plant Based Pea Protein  Shelf Stable  No GMOs, Soy, Nut, Gluten, Lactose   5) Avtodoria Glucose Support 1.2 Plant based, dairy free, low glycemic index  https://"[employer]/products/glucose-support-1-2-vanilla     Goal 2:  Try to have 1 snack daily between meals that includes 1 fruit serving 15g of carbs and healthy fat 5-15 grams and or lean protein 7-12g for the next eight weeks.   - see meal plan and recipe ideas in the cardi metabolic guides provided.       2 hard boiled eggs.   Greek Yogurt with Blackberries  Fresh Yellow Pear or veggie (radishes, bell peppers, baby cucumbers, carrots or sugar  snap peas) with Hummus - try the mini to go packs to help with portion control   Marinated Olives* and Kefir   Purple Plum with Mixed Nuts  Celery with [place] Butter  Dark Chocolate, 70% or higher Cocoa and Pistachio Nuts  Banana with almond butter or sunflower seed butter   Apple with peanut butter topped with cinnamon   Baby cucumber or carrots, bell pepper, radishes or sugar snap peas with individual servings of guac   Low sodium cottage with peaches or mandarin oranges   Sunflower seeds with strawberries   Turkey jerky or 100% grass fed beef jerky   Plant based protein bar   Simple Mills [place] Flour Crackers or Libby Gone Cracker dip with hummus or guac   Avocado on slice of gluten free bread recommend base culture brand or 100% whole grain bread       Mediterranean Approach: Eat whole, unprocessed real foods in their unprocessed forms such as fruits, vegetables, whole grains (prefer gluten free), nuts, legumes, extra virgin olive oil, spices, modest amounts of poultry, and fish.        Cardiometabolic Food Plan: 9610-1534 calories per day   Protein 6-7 servings per day  - include at each meal to stabilize blood sugars   (Choose 3oz or 21g per meal and aim for 1oz of 7g for snacks)   Strive for 1-2 servings of fish per week especially of higher omega-3 fatty acid containing fish such as salmon.   Legumes 1-2 serving per day   Dairy alternatives 2-3 serving per day   Nuts and seeds 2-3 servings per day - great to incorporate as snacks   Fats and oils 3-4 servings per day   Non starchy vegetables 8-10 servings per day   Starchy vegetable limit 1 serving per day as they tend to impact blood sugar (they are moderate-GI).   Fruits 2 servings per day - best to couple with a little bit of protein or fat to offset a rise in blood sugars (they are low-moderate-GI foods).   Whole grains 2 serving per day - try gluten free whole grains for more variety    Choose Low Glycemic (GI) foods: Regulate your sugar levels by  eating foods that do not spike blood sugars.  Eat low -GI foods so only small fluctuations in blood glucose and insulin levels are produced.      Examples of low-GI foods: nuts, seeds, GF oats, most vegetables especially non-starchy and fruits.     Medium or high-GI foods should be eaten with a protein or fat, both of which blunt the glycemic effect of these foods. This reduces the overall glycemic impact of a meal.   Ex: Most grains and starchy veggies are medium/high GI.       Avoid foods containing refined sugars, artificial sweeteners, and refined grains they are considered high-GI because they lead to sharp increases in blood sugars levels, which increase insulin sensitivity causing increased TG, and low good cholesterol (HDL).   Ex: cakes, cookies, pies, bread, sodas, fruit drinks, presweetened tea, coffee drinks, energy or sport drinks, flavored milk and other processed foods.     Choose foods high in fiber: Aim for at least 5 grams of fiber per serving of food or a total of 25-35 grams fiber per day. Remember, when looking at the label, you can take the fiber away from the total carbs. Ex:15g of total carbs - 4g of fiber = 11g net carbs     Insoluble fiber acts like a bulky  inner broom,  sweeping out debris from the intestine and creating more motility and movement.      Soluble fiber attracts water and swells, creating a gel that slows digestion.  Also, slows the release of glucose from foods into the blood which stops spikes in blood sugar levels.  Soluble fiber traps toxins in the gut, helping to carry them to excretion and provides healthy bacteria in the digestive tract.     Choose High Quality Fats: Adding anti-inflammatory fats into your diet such as fish (salmon, herring, mackerel, and sardines), omega 3 eggs, hawa seeds, ground flax seeds/milk, hemp seeds/milk, walnuts and some other certain leafy greens will increase omega-3 fats to omeaga-6 fats ratio.     Therapeutic fats both monounsaturated  and polyunsaturated to include daily: ground flaxseeds, unsalted mixed nuts, avocados, olives, extra-virgin olive oil.     Emphasize high-quality oils and fats in the diet daily such as avocado oil, coconut oil, flaxseed, olive, sesame. Ex: 1 tsp to 1 tbsp of MCT oil from coconuts can be added into coffee, smoothies, and salad dressings per day.   Avoid trans fats found in processed foods       NUTRITION RESOURCES:  IFM Cardiometabolic Packet 1400  Functional Nutrition Fundamentals   Comprehensive Guide  Meal plan with recipes

## 2023-02-03 NOTE — LETTER
2/3/2023         RE: Holly Muir  [address] [facility] [id number]        Dear Dr. Medrano,    Thank you for referring your patient, Holly Muir, to the nutrition services at [facility]. I enjoyed meeting with Holly and helping her create some nutrition goals to help with weight loss, balanced blood sugars and her low HGB.  Please see a copy of my visit note below for more details.     Medical Nutrition Therapy  Visit Type: Initial assessment and intervention    Visit Details    How would you like to obtain your AVS? MyChart  If the correspondence for visit is dropped, how would you like your dietitian to reconnect with you:   call back by phone? Yes    Text to cell phone: [phone]  Will anyone else be joining your video visit or telephone call? No  {If patient encounters technical issues they should call [phone]    Type of service:  Telephone     Start Time: 10:00am    End Time:11:00am    Originating Location (pt. Location): Home    Distant Location (provider location):  [facility] WORKING VIRTUAL FROM HOME       Referring Provider: Elizabeth Medrano  Internal (P) endocrinology    REASON FOR REFERRAL:   Holly Muir presents today for MNT and education related to type 2 diabetes, weight management.  She is accompanied by self    NUTRITION ASSESSMENT:   Patient comments/concerns relating to nutrition:   One to two times per week has been experiencing low blood sugars less than 60 usually has orange juice to help increase. takes blood sugars when gets up today 105 which has decreased as it was 145-150 a few weeks prior     She struggles with one week BS good and another week high -her average glucose over 14 days from Jan 19th to feb 1 was 226 hard to get to the target range sometimes of . she thinks she may be snacking too much or the wrong thing. She tends to get higher blood sugars per patient around snacks that is  Will have the patient see Dermatology doctor Meg Foreman when they are the highest       Food/Nutrition History:  Previous diet education: Met with the diabetes educator a couple days ago and went over plate and how much to eat for portions. She has started to cut back on carbs.     Taking Medication:      Current Diabetes Management per Patient:  Taking diabetes medications? Yes     Monitoring  Patient is being treated with:  oral agents and insulin  Patient glucose self monitoring as follows: continuously using a continuous glucose monitor (CGM)  BG meter: Freestyle Mathieu 2-Freestyle Brandi, connected.  meter  BG results: AGP report reviewed                                                                               Eating Patterns & Meal Planning:    Waking up at: 5-7am - Going to bed at: 9pm    Consuming Breakfast: Yes 7 times/week blood sugars: 105 and blood sugars 2 hour after eating 229  Chicken sandwich from [employer] with bun with few fries   Oatmeal with flax and almonds -she adds apples few or strawberries has almost daily with water or coffee sugar free   Sometimes will have boiled eggs in am depending on where sugars at       Consuming Lunch:  Yes 7 times/week  Depending on blood sugars checks before usually blood sugars around 130-140 tries to recheck every two hours but sometimes a little earlier   [place] 2 slices of bread with tuna (celery, onion and tomato) or turkey and sometimes just has turkey, cheese     Consuming Dinner:  Yes 7 times/week  Usually chicken small portions with vegetable such as carrots, broccoli and cauliflower usually with water sometimes soda diet 12oz but will raise blood sugars only once or twice per month   Has cut back on rice only does twice per month 1/4 cup     Snacks: Yes 7 times/week  Pistachios, popcorn, sweet potato chips - her daughter reads the labels and makes sure they are few. Her daughter puts them in zip lock portion sizes to help control blood sugars   Sometimes has smaller whole banana,3/4 cup blueberries or  grapes (not sure how many hasn't been counting) for snack       Skips meals/snacks: No      Beverages: Yes  Pop (Diet) 8 oz 2x/month, Tea  1-2x/month adds splenda, Coffee 1 cup/day or once while 2cups per day and Water 8 8oz cups or more/day    Barriers around meals/snacks include:Blood sugars go higher sometimes and can't have what she wants. She will want to have seconds but family helps watches that she doesn't have too much      Diet is high in: processed carbs especially at snacks   Diet is low in: fat (unsaturated) omega 3 fatty acids, fiber, fruits, vegetables and protein     Grocery Shopping: Yes once twice per month with daughter   Reading Food Labels:  No but daughters do  Barriers Include:back pain hard to cook and walk through grocery store so famiy help    Cooking at home: No daughters and son in law cooks for her - she goes back and forth from their homes     Eats out: Yes   1 meals/per week nadia or aubrie monterroso   Barriers around eating/cooking Include:hard to do salads all the time would like more variety but those foods are high in carbs. Having a hamburger is a treat      Lifestyle Patterns:  Feels stressed with life right now: No      no regular exercise program  Barriers Include:needs physical thearpy for back and knee pain    Struggling with sleep: Yes  Average hours of daily sleep: stays up all throughout the night till next day trouble sleeping only once or twice per month or week. It depends and sometimes blood sugars are high or low at this time. Needs to naps during the day.    When she does sleep it might be during the day and other days she wakes up at 5 or 6am and goes to bed at 10am almost daily     Social History:   Lives with: daughters    Family History   Problem Relation Age of Onset     Diabetes Mother      Cerebrovascular Disease Mother      Diabetes Father      Hypertension Father      Cancer Maternal Grandfather      Cancer Paternal Grandfather      Diabetes Brother      Diabetes  Sister      Thyroid Disease Daughter      Thyroid Disease Sister      Multiple Sclerosis Daughter      Glaucoma No family hx of      Macular Degeneration No family hx of           Pertinent Past Medical History:   I have reviewed this patient's medical history and updated it with pertinent information if needed.   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  Past Surgical History:   Procedure Laterality Date     ARTHROSCOPY KNEE RT/LT      LT     BLEPHAROPLASTY BILATERAL Bilateral 8/16/2019    Procedure: BILATERAL UPPER LID BLEPHAROPLASTY;  Surgeon: Randolph Cook MD;  Location: [facility] OR     BREAST LUMPECTOMY, RT/LT      LT-benign      CATARACT IOL, RT/LT       COLONOSCOPY  5/10/2012    Procedure:COLONOSCOPY; screening colonoscopy; Surgeon:MILLA VEGA; Location:[facility] OR     COLONOSCOPY WITH CO2 INSUFFLATION N/A 8/2/2019    Procedure: Colonoscopy with CO2 insufflation;  Surgeon: Himanshu Hi MD;  Location: [facility] OR     COLOSTOMY  2000     COLOSTOMY       HYSTERECTOMY, PAP NO LONGER INDICATED      done in [place]-cervical cancer     IMPLANT STIMULATOR SACRAL NERVE STAGE ONE Right 3/10/2015    Procedure: IMPLANT STIMULATOR SACRAL NERVE STAGE ONE;  Surgeon: Teodora Yusuf MD;  [facility]: [facility]     IMPLANT STIMULATOR SACRAL NERVE STAGE TWO Right 3/31/2015    Procedure: IMPLANT STIMULATOR SACRAL NERVE STAGE TWO;  Surgeon: Teodora Yusuf MD;  [facility]: [facility]      IMPLANT STIMULATOR SACRAL NERVE STAGE TWO Right 6/22/2020    Procedure: INSERTION, SACRAL NERVE STIMULATOR, STAGE 2 (replacement of interstim battery);  Surgeon: Teodora Yusuf MD;  Location: [facility]     INJECT EPIDURAL TRANSFORAMINAL Right 1/20/2023    Procedure: Right Lumbar 5-Sacral 1 Transforaminal Epidural Steroid Injection with fluoroscopic guidance and contrast;  Surgeon: Kulwinder Booth MD;  Location: [facility] OR     PHACOEMULSIFICATION WITH STANDARD INTRAOCULAR LENS IMPLANT Left 9/26/2019    Procedure: LEFT PHACOEMULSIFICATION, CATARACT, WITH STANDARD IOL INSERTION;  Surgeon: Francesco Winn MD;  Location: [facility]     PHACOEMULSIFICATION WITH STANDARD INTRAOCULAR LENS IMPLANT Right 10/24/2019    Procedure: RIGHT PHACOEMULSIFICATION, CATARACT, WITH STANDARD IOL INSERTION;  Surgeon: Francesco Winn MD;  Location: [facility]     REPAIR PTOSIS Bilateral 08/16/2019     SALPINGO OOPHORECTOMY,R/L/JENNIFER      Salpingo Oophorectomy, RT/LT/JENNIFER     ZZC EXCIS PTERYGIUM  10/08    [facility]     ZZ STOMACH SURGERY PROCEDURE UNLISTED       ZZHC COLONOSCOPY THRU STOMA, DIAGNOSTIC  2002    normal per report-no records available-records destroyed       Medications and Supplements:  Current Outpatient Medications   Medication     ACETAMINOPHEN EXTRA STRENGTH 500 MG tablet     ARIPiprazole (ABILIFY) 15 MG tablet     blood glucose (ACCU-CHEK CELIA PLUS) test strip     blood glucose (NO BRAND SPECIFIED) test strip     blood glucose monitoring (ACCU-CHEK CELIA PLUS) meter device kit     blood glucose monitoring (SOFTCLIX) lancets     buPROPion (WELLBUTRIN XL) 150 MG 24 hr tablet     busPIRone HCl (BUSPAR) 30 MG tablet     celecoxib (CELEBREX) 100 MG capsule     cetirizine (ZYRTEC) 10 MG tablet     Continuous Blood Gluc  (FREESTYLE CM 2 READER) GALINA     Continuous Blood Gluc Sensor (FREESTYLE CM 14 DAY SENSOR) MISC     Continuous Blood Gluc Sensor (FREESTYLE CM 2 SENSOR) MISC     CVS ASPIRIN ADULT LOW  DOSE 81 MG chewable tablet     ferrous gluconate (FERGON) 324 (38 Fe) MG tablet     gabapentin (NEURONTIN) 600 MG tablet     glimepiride (AMARYL) 2 MG tablet     ibuprofen (ADVIL/MOTRIN) 200 MG tablet     insulin glargine (LANTUS SOLOSTAR) 100 UNIT/ML pen     insulin pen needle (31G X 5 MM) 31G X 5 MM miscellaneous     Lancets (ONETOUCH DELICA PLUS [id number]) MISC     metFORMIN (GLUCOPHAGE XR) 500 MG 24 hr tablet     Multiple Vitamin (DAILY-ISAAC MULTIVITAMIN) TABS     naloxone (NARCAN) 4 MG/0.1ML nasal spray     nystatin (MYCOSTATIN) 658961 UNIT/GM external powder     omeprazole (PRILOSEC) 20 MG DR capsule     order for DME     order for DME     order for DME     order for DME     ORDER FOR DME     ORDER FOR DME     PARoxetine (PAXIL) 20 MG tablet     pioglitazone (ACTOS) 45 MG tablet     propranolol ER (INDERAL LA) 80 MG 24 hr capsule     ramipril (ALTACE) 5 MG capsule     rOPINIRole (REQUIP) 0.25 MG tablet     simvastatin (ZOCOR) 20 MG tablet     traMADol (ULTRAM) 50 MG tablet     traZODone (DESYREL) 50 MG tablet     UNIVERSAL REMOVER WIPES EX MISC     vitamin C (CVS VITAMIN C) 500 MG tablet     VITAMIN D3 25 MCG (1000 UT) tablet     Current Facility-Administered Medications   Medication     lidocaine 1 % injection 2 mL     triamcinolone (KENALOG-40) injection 40 mg     triamcinolone (KENALOG-40) injection 40 mg     triamcinolone (KENALOG-40) injection 40 mg       LABS:  Last Basic Metabolic Panel:  Lab Results   Component Value Date     01/19/2023     08/24/2020      Lab Results   Component Value Date    POTASSIUM 4.5 01/19/2023    POTASSIUM 3.8 08/24/2020     Lab Results   Component Value Date    CHLORIDE 99 01/19/2023    CHLORIDE 103 08/24/2020     Lab Results   Component Value Date    NICOLÁS 9.3 01/19/2023    NICOLÁS 8.9 08/24/2020     Lab Results   Component Value Date    CO2 30 01/19/2023    CO2 24 08/24/2020     Lab Results   Component Value Date    BUN 15 01/19/2023    BUN 7 08/24/2020     Lab Results    Component Value Date    CR 0.78 01/19/2023    CR 0.68 08/24/2020     Lab Results   Component Value Date    GLC 78 01/19/2023     08/24/2020       Last Glucose Profile:   Hemoglobin A1C   Date Value Ref Range Status   01/19/2023 9.1 (H) 0.0 - 5.6 % Final   11/12/2021 8.9 (A) 0.0 - 5.7 % Final       Last Lipid Profile:   Cholesterol   Date Value Ref Range Status   01/19/2023 84 <200 mg/dL Final   10/08/2021 134 <200 mg/dL Final   06/18/2020 121 <200 mg/dL Final   12/14/2018 118 <200 mg/dL Final     HDL Cholesterol   Date Value Ref Range Status   06/18/2020 78 >49 mg/dL Final   12/14/2018 68 >49 mg/dL Final     Direct Measure HDL   Date Value Ref Range Status   01/19/2023 51 >=50 mg/dL Final   10/08/2021 76 >=50 mg/dL Final     LDL Cholesterol Calculated   Date Value Ref Range Status   01/19/2023 16 <=100 mg/dL Final   10/08/2021 39 <=100 mg/dL Final   06/18/2020 26 <100 mg/dL Final     Comment:     Desirable:       <100 mg/dl   12/14/2018 25 <100 mg/dL Final     Comment:     Desirable:       <100 mg/dl     Triglycerides   Date Value Ref Range Status   01/19/2023 83 <150 mg/dL Final   10/08/2021 96 <150 mg/dL Final   06/18/2020 87 <150 mg/dL Final   12/14/2018 124 <150 mg/dL Final     Comment:     Fasting specimen     Cholesterol/HDL Ratio   Date Value Ref Range Status   09/18/2015 2.0 0.0 - 5.0 Final   07/29/2014 2.7 0.0 - 5.0 Final       Most recent CBC:  Recent Labs   Lab Test 01/19/23  1115 04/22/22  1339   WBC 7.7 7.4   HGB 10.0* 11.3*   HCT 31.7* 34.9*    334     Most recent hepatic panel:  Recent Labs   Lab Test 01/19/23  1115 10/08/21  0922   ALT 20 35   AST 10 22     Most recent creatinine:  Recent Labs   Lab Test 01/19/23  1115 04/22/22  1339   CR 0.78 0.80       Last Thyroid Profile:   TSH   Date Value Ref Range Status   08/24/2020 1.44 0.40 - 4.00 mU/L Final       Last Mineral Profile:   Ferritin   Date Value Ref Range Status   10/14/2020 40 8 - 252 ng/mL Final     Iron   Date Value Ref  "Range Status   10/14/2020 43 35 - 180 ug/dL Final     Iron Binding Cap   Date Value Ref Range Status   10/14/2020 434 (H) 240 - 430 ug/dL Final         Last Vitamin Profile:   25 OH Vit D2   Date Value Ref Range Status   02/03/2015 <5 ug/L Final     25 OH Vit D3   Date Value Ref Range Status   02/03/2015 49 ug/L Final     25 OH Vit D total   Date Value Ref Range Status   02/03/2015  30 - 75 ug/L Final    <54  Season, race, dietary intake, and treatment affect the concentration of   25-hydroxy-Vitamin D. Values may decrease during winter months and increase   during summer months. Values less than 30 ug/L may indicate Vitamin D   deficiency.   This test was developed and its performance characteristics determined by the   [facility],  [facility] Laboratory. It has   not been cleared or approved by the FDA. The laboratory is regulated under CLIA   as qualified to perform high-complexity testing. This test is used for clinical   purposes. It should not be regarded as investigational or for research.         ANTHROPOMETRICS:  Vitals:   BP Readings from Last 1 Encounters:   01/20/23 129/64     Pulse Readings from Last 1 Encounters:   01/20/23 71     Estimated body mass index is 36.58 kg/m  as calculated from the following:    Height as of 1/2/23: 1.575 m (5' 2\").    Weight as of 1/20/23: 90.7 kg (200 lb).    Wt Readings from Last 5 Encounters:   01/20/23 90.7 kg (200 lb)   01/02/23 90.7 kg (200 lb)   11/19/22 90.7 kg (200 lb)   09/14/22 93 kg (205 lb)   08/25/22 93 kg (205 lb)       NUTRITION DIAGNOSIS:   1. Altered nutrition-related laboratory values related to endocrine dysfunction as evidenced by elevated BMI 36, HbA1c 9.1%  and average glucose per AGP report Jan 14- Feb 1st 226. Low HGB 10      NUTRITION INTERVENTION:   Nutrition Education (Application):  Cardiometabolic Food Plan Materials by [facility]     Discussed healthy eating patters, portion sizes " for healthy fats and proteins and carbs    Reviewed and planned out some recipes for breakfast and snacks that included protein and healthy fats     Long Term Goals:   Goal: Decrease fasting blood sugars to 70-99 mg/dL within 1-6 months  Goal: Decrease Hemoglobin A1c <7 % within 2-6 months   Goal: Lose 20-24lbs in 6 months, recommend average 1-2lbs per week of weight loss.   Goal: Increase protein intake to help normalize HGB levels to 11.7-15.7 g/dL         Short Term Goals:  Goal 1: Focus on having a healthy balanced breakfast daily for the next six weeks. - focus on lean proteins 2 serving 12g protein  Incorporate a whole grain at breakfast ex continue oatmeal naturally gluten free with more healthy fats such as handful of walnuts and 1 fruit serving 15g of carbs such as blueberries. Focus on adding in a lean protein as side such as 2 eggs, 2 turkey or chicken sausage.    Try a smoothie 1-2x a week for breakfast for the next six weeks see recipe discussed. add in unsweetened flax, hemp or coconut milk, 1 tbsp of healthy fat such as flax seed ground or hawa seed ground. serving of fruit 1 cup of berries. Veggie (optional), 1 scoop of plant based protein powder or collagen powder, 1 serving of fruit and veggie such as kale, spinach, or veggie powder even spirulina     Avocado on slice of gluten free bread recommend base culture brand      https://[employer]/?gclid=[email or web address]     Birch mobley brand for pancakes - paleo  https://[employer]/collections/pancake-waffle-mix/products/paleo     Try siete soft taco shells with eggs and veggies in am for breakfast   https://[employer]/collections/tortillas/products/gjchei-dguag-cvuntmbew-6-pack    Incorporate protein powder daily with smoothie:    Plant based hemp (recommended brands: [place], Nutiva, Just Hemp Protein, Plei Red Mill)      Plant based pea (recommended  brands: Naked Pea, Now Sports). If you want to try a combo of pea and hemp the brand Dawson in vanilla or chocolate is a great option.     or collagen peptides -collagen can be used for smoothies, oatmeal and soups. This can help provide essential amino acids and minerals that heal your gut as well as balance blood sugars. A great option if you have a hard time tolerating solids.     Can also consider pre made shakes if unable to make smoothies.      Brand examples that are gluten and dairy free to try:   1) Orgain Vegan Protein Shakes, 20g of Plant Based Protein, Creamy Chocolate - Gluten Free, No Dairy, Soy, or Preservatives, No Added Sugar  2) Pirq, Vegan Protein Shake, Turmeric Curcumin, Perri, Plant-Based Protein Drink, Gluten-Free, Dairy-Free, Soy-Free, Non-GMO, Vegetarian, Kosher, Keto, Low Carb, Low Calorie  3) OWYN Pro Elite Vegan Plant-Based High Protein Shake, 35g Protein, 9 Amino Acids, Omega-3, Prebiotics, Superfoods Greens for Workout and Recovery, 0g Net Carbs, Zero Sugar, Keto  4) Ripple Vegan Protein Shake  Chocolate  20g Nutritious Plant Based Pea Protein  Shelf Stable  No GMOs, Soy, Nut, Gluten, Lactose   5) Verisim Glucose Support 1.2 Plant based, dairy free, low glycemic index  https://[employer].Mixx/products/glucose-support-1-2-vanilla     Goal 2:  Try to have 1 snack daily between meals that includes 1 fruit serving 15g of carbs and healthy fat 5-15 grams and or lean protein 7-12g for the next eight weeks.   - see meal plan and recipe ideas in the cardi metabolic guides provided.         2 hard boiled eggs.     Greek Yogurt with Blackberries    Fresh Yellow Pear or veggie (radishes, bell peppers, baby cucumbers, carrots or sugar snap peas) with Hummus - try the mini to go packs to help with portion control     Marinated Olives* and Kefir     Purple Plum with Mixed Nuts    Celery with Holloway Butter    Dark Chocolate, 70% or higher Cocoa and Pistachio Nuts    Banana with almond butter or  sunflower seed butter     Apple with peanut butter topped with cinnamon     Baby cucumber or carrots, bell pepper, radishes or sugar snap peas with individual servings of guac     Low sodium cottage with peaches or mandarin oranges     Sunflower seeds with strawberries     Turkey jerky or 100% grass fed beef jerky     Plant based protein bar     Simple Mills [place] Flour Crackers or Libby Gone Cracker dip with hummus or guac     Avocado on slice of gluten free bread recommend base culture brand or 100% whole grain bread       Mediterranean Approach: Eat whole, unprocessed real foods in their unprocessed forms such as fruits, vegetables, whole grains (prefer gluten free), nuts, legumes, extra virgin olive oil, spices, modest amounts of poultry, and fish.        Cardiometabolic Food Plan: 0598-5326 calories per day     Protein 6-7 servings per day  - include at each meal to stabilize blood sugars   (Choose 3oz or 21g per meal and aim for 1oz of 7g for snacks)   - Strive for 1-2 servings of fish per week especially of higher omega-3 fatty acid containing fish such as salmon.     Legumes 1-2 serving per day     Dairy alternatives 2-3 serving per day     Nuts and seeds 2-3 servings per day - great to incorporate as snacks     Fats and oils 3-4 servings per day     Non starchy vegetables 8-10 servings per day     Starchy vegetable limit 1 serving per day as they tend to impact blood sugar (they are moderate-GI).     Fruits 2 servings per day - best to couple with a little bit of protein or fat to offset a rise in blood sugars (they are low-moderate-GI foods).     Whole grains 2 serving per day - try gluten free whole grains for more variety    Choose Low Glycemic (GI) foods: Regulate your sugar levels by eating foods that do not spike blood sugars.  Eat low -GI foods so only small fluctuations in blood glucose and insulin levels are produced.      Examples of low-GI foods: nuts, seeds, GF oats, most vegetables especially  non-starchy and fruits.     Medium or high-GI foods should be eaten with a protein or fat, both of which blunt the glycemic effect of these foods. This reduces the overall glycemic impact of a meal.   Ex: Most grains and starchy veggies are medium/high GI.       Avoid foods containing refined sugars, artificial sweeteners, and refined grains they are considered high-GI because they lead to sharp increases in blood sugars levels, which increase insulin sensitivity causing increased TG, and low good cholesterol (HDL).   Ex: cakes, cookies, pies, bread, sodas, fruit drinks, presweetened tea, coffee drinks, energy or sport drinks, flavored milk and other processed foods.     Choose foods high in fiber: Aim for at least 5 grams of fiber per serving of food or a total of 25-35 grams fiber per day. Remember, when looking at the label, you can take the fiber away from the total carbs. Ex:15g of total carbs - 4g of fiber = 11g net carbs     Insoluble fiber acts like a bulky  inner broom,  sweeping out debris from the intestine and creating more motility and movement.      Soluble fiber attracts water and swells, creating a gel that slows digestion.  Also, slows the release of glucose from foods into the blood which stops spikes in blood sugar levels.  Soluble fiber traps toxins in the gut, helping to carry them to excretion and provides healthy bacteria in the digestive tract.     Choose High Quality Fats: Adding anti-inflammatory fats into your diet such as fish (salmon, herring, mackerel, and sardines), omega 3 eggs, hawa seeds, ground flax seeds/milk, hemp seeds/milk, walnuts and some other certain leafy greens will increase omega-3 fats to omeaga-6 fats ratio.     Therapeutic fats both monounsaturated and polyunsaturated to include daily: ground flaxseeds, unsalted mixed nuts, avocados, olives, extra-virgin olive oil.     Emphasize high-quality oils and fats in the diet daily such as avocado oil, coconut oil, flaxseed,  olive, sesame. Ex: 1 tsp to 1 tbsp of MCT oil from coconuts can be added into coffee, smoothies, and salad dressings per day.     Avoid trans fats found in processed foods       NUTRITION RESOURCES:  1. [facility] Cardiometabolic Packet 1400    Functional Nutrition Fundamentals     Comprehensive Guide    Meal plan with recipes       PATIENT'S BEHAVIOR CHANGE GOALS:   See nutrition intervention for patient stated behavior change goals.     MONITOR / EVALUATE:  Registered Dietitian will monitor/evaluate the following:     Beliefs and attitudes related to food    Food and nutrition knowledge / skills    Food / Beverage / Nutrient intake     Pertinent Labs    Progress toward meeting stated nutrition-related goals    Readiness to change nutrition-related behaviors    Weight change    Digestion     COORDINATION OF CARE:  Follow up with endocrinology and primary care provider       FOLLOW-UP:  Follow-up appointment scheduled on April 11th at 10am.       Time spent in minutes: 60 minutes 4 units   Encounter: Individual    Serena Caro RD, CLT, LD  Integrative Registered Dietitian             Again, thank you for allowing me to participate in the care of your patient.        Sincerely,        Serena Caro RD

## 2023-02-03 NOTE — PROGRESS NOTES
Medical Nutrition Therapy  Visit Type: Initial assessment and intervention    Visit Details    How would you like to obtain your AVS? MyChart  If the correspondence for visit is dropped, how would you like your dietitian to reconnect with you:   call back by phone? Yes    Text to cell phone: [phone]  Will anyone else be joining your video visit or telephone call? No  {If patient encounters technical issues they should call [phone]    Type of service:  Telephone     Start Time: 10:00am    End Time:11:00am    Originating Location (pt. Location): Home    Distant Location (provider location):  [facility] WORKING VIRTUAL FROM HOME       Referring Provider: Elziabeth Medrano  Internal (P) endocrinology    REASON FOR REFERRAL:   Holly Muir presents today for MNT and education related to type 2 diabetes, weight management.  She is accompanied by self    NUTRITION ASSESSMENT:   Patient comments/concerns relating to nutrition:   One to two times per week has been experiencing low blood sugars less than 60 usually has orange juice to help increase. takes blood sugars when gets up today 105 which has decreased as it was 145-150 a few weeks prior     She struggles with one week BS good and another week high -her average glucose over 14 days from Jan 19th to feb 1 was 226 hard to get to the target range sometimes of . she thinks she may be snacking too much or the wrong thing. She tends to get higher blood sugars per patient around snacks that is when they are the highest       Food/Nutrition History:  Previous diet education: Met with the diabetes educator a couple days ago and went over plate and how much to eat for portions. She has started to cut back on carbs.     Taking Medication:      Current Diabetes Management per Patient:  Taking diabetes medications? Yes     Monitoring  Patient is being treated with:  oral agents and insulin  Patient glucose self monitoring as follows:  continuously using a continuous glucose monitor (CGM)  BG meter: Freestyle Mathieu 2-Freestyle Brandi, connected.  meter  BG results: AGP report reviewed                                                                               Eating Patterns & Meal Planning:    Waking up at: 5-7am - Going to bed at: 9pm    Consuming Breakfast: Yes 7 times/week blood sugars: 105 and blood sugars 2 hour after eating 229  Chicken sandwich from [employer] with bun with few fries   Oatmeal with flax and almonds -she adds apples few or strawberries has almost daily with water or coffee sugar free   Sometimes will have boiled eggs in am depending on where sugars at       Consuming Lunch:  Yes 7 times/week  Depending on blood sugars checks before usually blood sugars around 130-140 tries to recheck every two hours but sometimes a little earlier   [place] 2 slices of bread with tuna (celery, onion and tomato) or turkey and sometimes just has turkey, cheese     Consuming Dinner:  Yes 7 times/week  Usually chicken small portions with vegetable such as carrots, broccoli and cauliflower usually with water sometimes soda diet 12oz but will raise blood sugars only once or twice per month   Has cut back on rice only does twice per month 1/4 cup     Snacks: Yes 7 times/week  Pistachios, popcorn, sweet potato chips - her daughter reads the labels and makes sure they are few. Her daughter puts them in zip lock portion sizes to help control blood sugars   Sometimes has smaller whole banana,3/4 cup blueberries or grapes (not sure how many hasn't been counting) for snack       Skips meals/snacks: No      Beverages: Yes  Pop (Diet) 8 oz 2x/month, Tea  1-2x/month adds splenda, Coffee 1 cup/day or once while 2cups per day and Water 8 8oz cups or more/day    Barriers around meals/snacks include:Blood sugars go higher sometimes and can't have what she wants. She will want to have seconds but family helps watches that she doesn't have too much      Diet is  high in: processed carbs especially at snacks   Diet is low in: fat (unsaturated) omega 3 fatty acids, fiber, fruits, vegetables and protein     Grocery Shopping: Yes once twice per month with daughter   Reading Food Labels:  No but daughters do  Barriers Include:back pain hard to cook and walk through grocery store so famiy help    Cooking at home: No daughters and son in law cooks for her - she goes back and forth from their homes     Eats out: Yes   1 meals/per week nadia or aubrie monterroso   Barriers around eating/cooking Include:hard to do salads all the time would like more variety but those foods are high in carbs. Having a hamburger is a treat      Lifestyle Patterns:  Feels stressed with life right now: No      no regular exercise program  Barriers Include:needs physical thearpy for back and knee pain    Struggling with sleep: Yes  Average hours of daily sleep: stays up all throughout the night till next day trouble sleeping only once or twice per month or week. It depends and sometimes blood sugars are high or low at this time. Needs to naps during the day.    When she does sleep it might be during the day and other days she wakes up at 5 or 6am and goes to bed at 10am almost daily     Social History:   Lives with: daughters    Family History   Problem Relation Age of Onset     Diabetes Mother      Cerebrovascular Disease Mother      Diabetes Father      Hypertension Father      Cancer Maternal Grandfather      Cancer Paternal Grandfather      Diabetes Brother      Diabetes Sister      Thyroid Disease Daughter      Thyroid Disease Sister      Multiple Sclerosis Daughter      Glaucoma No family hx of      Macular Degeneration No family hx of           Pertinent Past Medical History:   I have reviewed this patient's medical history and updated it with pertinent information if needed.   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  Past Surgical History:   Procedure Laterality Date     ARTHROSCOPY KNEE RT/LT      LT     BLEPHAROPLASTY BILATERAL Bilateral 8/16/2019    Procedure: BILATERAL UPPER LID BLEPHAROPLASTY;  Surgeon: Randolph Cook MD;  Location: [facility]     BREAST LUMPECTOMY, RT/LT      LT-benign      CATARACT IOL, RT/LT       COLONOSCOPY  5/10/2012    Procedure:COLONOSCOPY; screening colonoscopy; Surgeon:MILLA VEGA; Location:[facility] OR     COLONOSCOPY WITH CO2 INSUFFLATION N/A 8/2/2019    Procedure: Colonoscopy with CO2 insufflation;  Surgeon: Himanshu Hi MD;  Location: [facility] OR     COLOSTOMY  2000     COLOSTOMY       HYSTERECTOMY, PAP NO LONGER INDICATED      done in [place]-cervical cancer     IMPLANT STIMULATOR SACRAL NERVE STAGE ONE Right 3/10/2015    Procedure: IMPLANT STIMULATOR SACRAL NERVE STAGE ONE;  Surgeon: Teodora Yusuf MD;  [facility]: [facility]     IMPLANT STIMULATOR SACRAL NERVE STAGE TWO Right 3/31/2015    Procedure: IMPLANT STIMULATOR SACRAL NERVE STAGE TWO;  Surgeon: Teodora Yusuf MD;  [facility]     IMPLANT STIMULATOR SACRAL NERVE STAGE TWO Right 6/22/2020    Procedure: INSERTION, SACRAL NERVE STIMULATOR, STAGE 2 (replacement of interstim battery);  Surgeon: Teodora Yusuf MD;  Location: [facility]     INJECT EPIDURAL TRANSFORAMINAL Right 1/20/2023    Procedure: Right Lumbar 5-Sacral 1 Transforaminal Epidural Steroid Injection with fluoroscopic guidance and contrast;  Surgeon: Kulwinder Booth MD;  Location: [facility] OR      PHACOEMULSIFICATION WITH STANDARD INTRAOCULAR LENS IMPLANT Left 9/26/2019    Procedure: LEFT PHACOEMULSIFICATION, CATARACT, WITH STANDARD IOL INSERTION;  Surgeon: Francesco Winn MD;  Location: [facility]     PHACOEMULSIFICATION WITH STANDARD INTRAOCULAR LENS IMPLANT Right 10/24/2019    Procedure: RIGHT PHACOEMULSIFICATION, CATARACT, WITH STANDARD IOL INSERTION;  Surgeon: Francesco Winn MD;  Location: [facility]     REPAIR PTOSIS Bilateral 08/16/2019     SALPINGO OOPHORECTOMY,R/L/JENNIFER      Salpingo Oophorectomy, RT/LT/JENNIFER     ZZC EXCIS PTERYGIUM  10/08    [facility]     [facility] [place] STOMACH SURGERY PROCEDURE UNLISTED       [facility]  [facility]    normal per report-no records available-records destroyed       Medications and Supplements:  Current Outpatient Medications   Medication     ACETAMINOPHEN EXTRA STRENGTH 500 MG tablet     ARIPiprazole (ABILIFY) 15 MG tablet     blood glucose (ACCU-CHEK CELIA PLUS) test strip     blood glucose (NO BRAND SPECIFIED) test strip     blood glucose monitoring (ACCU-CHEK CELIA PLUS) meter device kit     blood glucose monitoring (SOFTCLIX) lancets     buPROPion (WELLBUTRIN XL) 150 MG 24 hr tablet     busPIRone HCl (BUSPAR) 30 MG tablet     celecoxib (CELEBREX) 100 MG capsule     cetirizine (ZYRTEC) 10 MG tablet     Continuous Blood Gluc  (FREESTYLE CM 2 READER) GALINA     Continuous Blood Gluc Sensor (FREESTYLE CM 14 DAY SENSOR) MISC     Continuous Blood Gluc Sensor (FREESTYLE CM 2 SENSOR) MISC     CVS ASPIRIN ADULT LOW DOSE 81 MG chewable tablet     ferrous gluconate (FERGON) 324 (38 Fe) MG tablet     gabapentin (NEURONTIN) 600 MG tablet     glimepiride (AMARYL) 2 MG tablet     ibuprofen (ADVIL/MOTRIN) 200 MG tablet     insulin glargine (LANTUS SOLOSTAR) 100 UNIT/ML pen     insulin pen needle (31G X 5 MM) 31G X 5 MM miscellaneous     Lancets (ONETOUCH DELICA PLUS [id number]) MISC     metFORMIN (GLUCOPHAGE XR) 500 MG 24 hr tablet     Multiple  Vitamin (DAILY-ISAAC MULTIVITAMIN) TABS     naloxone (NARCAN) 4 MG/0.1ML nasal spray     nystatin (MYCOSTATIN) 722726 UNIT/GM external powder     omeprazole (PRILOSEC) 20 MG DR capsule     order for DME     order for DME     order for DME     order for DME     ORDER FOR DME     ORDER FOR DME     PARoxetine (PAXIL) 20 MG tablet     pioglitazone (ACTOS) 45 MG tablet     propranolol ER (INDERAL LA) 80 MG 24 hr capsule     ramipril (ALTACE) 5 MG capsule     rOPINIRole (REQUIP) 0.25 MG tablet     simvastatin (ZOCOR) 20 MG tablet     traMADol (ULTRAM) 50 MG tablet     traZODone (DESYREL) 50 MG tablet     UNIVERSAL REMOVER WIPES EX MISC     vitamin C (CVS VITAMIN C) 500 MG tablet     VITAMIN D3 25 MCG (1000 UT) tablet     Current Facility-Administered Medications   Medication     lidocaine 1 % injection 2 mL     triamcinolone (KENALOG-40) injection 40 mg     triamcinolone (KENALOG-40) injection 40 mg     triamcinolone (KENALOG-40) injection 40 mg       LABS:  Last Basic Metabolic Panel:  Lab Results   Component Value Date     01/19/2023     08/24/2020      Lab Results   Component Value Date    POTASSIUM 4.5 01/19/2023    POTASSIUM 3.8 08/24/2020     Lab Results   Component Value Date    CHLORIDE 99 01/19/2023    CHLORIDE 103 08/24/2020     Lab Results   Component Value Date    NICOLÁS 9.3 01/19/2023    NICOLÁS 8.9 08/24/2020     Lab Results   Component Value Date    CO2 30 01/19/2023    CO2 24 08/24/2020     Lab Results   Component Value Date    BUN 15 01/19/2023    BUN 7 08/24/2020     Lab Results   Component Value Date    CR 0.78 01/19/2023    CR 0.68 08/24/2020     Lab Results   Component Value Date    GLC 78 01/19/2023     08/24/2020       Last Glucose Profile:   Hemoglobin A1C   Date Value Ref Range Status   01/19/2023 9.1 (H) 0.0 - 5.6 % Final   11/12/2021 8.9 (A) 0.0 - 5.7 % Final       Last Lipid Profile:   Cholesterol   Date Value Ref Range Status   01/19/2023 84 <200 mg/dL Final   10/08/2021 134 <200  mg/dL Final   06/18/2020 121 <200 mg/dL Final   12/14/2018 118 <200 mg/dL Final     HDL Cholesterol   Date Value Ref Range Status   06/18/2020 78 >49 mg/dL Final   12/14/2018 68 >49 mg/dL Final     Direct Measure HDL   Date Value Ref Range Status   01/19/2023 51 >=50 mg/dL Final   10/08/2021 76 >=50 mg/dL Final     LDL Cholesterol Calculated   Date Value Ref Range Status   01/19/2023 16 <=100 mg/dL Final   10/08/2021 39 <=100 mg/dL Final   06/18/2020 26 <100 mg/dL Final     Comment:     Desirable:       <100 mg/dl   12/14/2018 25 <100 mg/dL Final     Comment:     Desirable:       <100 mg/dl     Triglycerides   Date Value Ref Range Status   01/19/2023 83 <150 mg/dL Final   10/08/2021 96 <150 mg/dL Final   06/18/2020 87 <150 mg/dL Final   12/14/2018 124 <150 mg/dL Final     Comment:     Fasting specimen     Cholesterol/HDL Ratio   Date Value Ref Range Status   09/18/2015 2.0 0.0 - 5.0 Final   07/29/2014 2.7 0.0 - 5.0 Final       Most recent CBC:  Recent Labs   Lab Test 01/19/23  1115 04/22/22  1339   WBC 7.7 7.4   HGB 10.0* 11.3*   HCT 31.7* 34.9*    334     Most recent hepatic panel:  Recent Labs   Lab Test 01/19/23  1115 10/08/21  0922   ALT 20 35   AST 10 22     Most recent creatinine:  Recent Labs   Lab Test 01/19/23  1115 04/22/22  1339   CR 0.78 0.80       Last Thyroid Profile:   TSH   Date Value Ref Range Status   08/24/2020 1.44 0.40 - 4.00 mU/L Final       Last Mineral Profile:   Ferritin   Date Value Ref Range Status   10/14/2020 40 8 - 252 ng/mL Final     Iron   Date Value Ref Range Status   10/14/2020 43 35 - 180 ug/dL Final     Iron Binding Cap   Date Value Ref Range Status   10/14/2020 434 (H) 240 - 430 ug/dL Final         Last Vitamin Profile:   25 OH Vit D2   Date Value Ref Range Status   02/03/2015 <5 ug/L Final     25 OH Vit D3   Date Value Ref Range Status   02/03/2015 49 ug/L Final     25 OH Vit D total   Date Value Ref Range Status   02/03/2015  30 - 75 ug/L Final    <54  Season, race,  "dietary intake, and treatment affect the concentration of   25-hydroxy-Vitamin D. Values may decrease during winter months and increase   during summer months. Values less than 30 ug/L may indicate Vitamin D   deficiency.   This test was developed and its performance characteristics determined by the   [facility],  Special Chemistry Laboratory. It has   not been cleared or approved by the FDA. The laboratory is regulated under CLIA   as qualified to perform high-complexity testing. This test is used for clinical   purposes. It should not be regarded as investigational or for research.         ANTHROPOMETRICS:  Vitals:   BP Readings from Last 1 Encounters:   01/20/23 129/64     Pulse Readings from Last 1 Encounters:   01/20/23 71     Estimated body mass index is 36.58 kg/m  as calculated from the following:    Height as of 1/2/23: 1.575 m (5' 2\").    Weight as of 1/20/23: 90.7 kg (200 lb).    Wt Readings from Last 5 Encounters:   01/20/23 90.7 kg (200 lb)   01/02/23 90.7 kg (200 lb)   11/19/22 90.7 kg (200 lb)   09/14/22 93 kg (205 lb)   08/25/22 93 kg (205 lb)       NUTRITION DIAGNOSIS:   1. Altered nutrition-related laboratory values related to endocrine dysfunction as evidenced by elevated BMI 36, HbA1c 9.1%  and average glucose per P report Jan 14- Feb 1st 226. Low HGB 10      NUTRITION INTERVENTION:   Nutrition Education (Application):  Cardiometabolic Food Plan Materials by [employer]     Discussed healthy eating patters, portion sizes for healthy fats and proteins and carbs    Reviewed and planned out some recipes for breakfast and snacks that included protein and healthy fats     Long Term Goals:   Goal: Decrease fasting blood sugars to 70-99 mg/dL within 1-6 months  Goal: Decrease Hemoglobin A1c <7 % within 2-6 months   Goal: Lose 20-24lbs in 6 months, recommend average 1-2lbs per week of weight loss.   Goal: Increase protein intake to help normalize " HGB levels to 11.7-15.7 g/dL         Short Term Goals:  Goal 1: Focus on having a healthy balanced breakfast daily for the next six weeks. - focus on lean proteins 2 serving 12g protein  Incorporate a whole grain at breakfast ex continue oatmeal naturally gluten free with more healthy fats such as handful of walnuts and 1 fruit serving 15g of carbs such as blueberries. Focus on adding in a lean protein as side such as 2 eggs, 2 turkey or chicken sausage.    Try a smoothie 1-2x a week for breakfast for the next six weeks see recipe discussed. add in unsweetened flax, hemp or coconut milk, 1 tbsp of healthy fat such as flax seed ground or hawa seed ground. serving of fruit 1 cup of berries. Veggie (optional), 1 scoop of plant based protein powder or collagen powder, 1 serving of fruit and veggie such as kale, spinach, or veggie powder even spirulina     Avocado on slice of gluten free bread recommend base culture brand      https://[employer]/?[email or web address]     Birch mobley brand for pancakes - paleo  https://AktiveBay/collections/pancake-waffle-mix/products/paleo     Try siete soft taco shells with eggs and veggies in am for breakfast   https://[place]/collections/tortillas/products/istboj-wctvk-mxhdtensp-6-pack    Incorporate protein powder daily with smoothie:    Plant based hemp (recommended brands: [place], Nutiva, Just Hemp Protein, [employer] [place])      Plant based pea (recommended brands: Naked Pea, Now Sports). If you want to try a combo of pea and hemp the brand Dawson in vanilla or chocolate is a great option.     or collagen peptides -collagen can be used for smoothies, oatmeal and soups. This can help provide essential amino acids and minerals that heal your gut as well as balance blood sugars. A great option if you have a hard time tolerating solids.     Can also consider pre made shakes if  unable to make smoothies.      Brand examples that are gluten and dairy free to try:   1) Orgain Vegan Protein Shakes, 20g of Plant Based Protein, Creamy Chocolate - Gluten Free, No Dairy, Soy, or Preservatives, No Added Sugar  2) Pirq, Vegan Protein Shake, Turmeric Curcumin, Perri, Plant-Based Protein Drink, Gluten-Free, Dairy-Free, Soy-Free, Non-GMO, Vegetarian, Kosher, Keto, Low Carb, Low Calorie  3) OWYN Pro Elite Vegan Plant-Based High Protein Shake, 35g Protein, 9 Amino Acids, Omega-3, Prebiotics, Superfoods Greens for Workout and Recovery, 0g Net Carbs, Zero Sugar, Keto  4) Ripple Vegan Protein Shake  Chocolate  20g Nutritious Plant Based Pea Protein  Shelf Stable  No GMOs, Soy, Nut, Gluten, Lactose   5) [employer] Glucose Support 1.2 Plant based, dairy free, low glycemic index  https://Sonicbids.Business [employer] via [place]/products/glucose-support-1-2-vanilla     Goal 2:  Try to have 1 snack daily between meals that includes 1 fruit serving 15g of carbs and healthy fat 5-15 grams and or lean protein 7-12g for the next eight weeks.   - see meal plan and recipe ideas in the cardi metabolic guides provided.         2 hard boiled eggs.     Greek Yogurt with Blackberries    Fresh Yellow Pear or veggie (radishes, bell peppers, baby cucumbers, carrots or sugar snap peas) with Hummus - try the mini to go packs to help with portion control     Marinated Olives* and Kefir     Purple Plum with Mixed Nuts    Celery with Nash Butter    Dark Chocolate, 70% or higher Cocoa and Pistachio Nuts    Banana with almond butter or sunflower seed butter     Apple with peanut butter topped with cinnamon     Baby cucumber or carrots, bell pepper, radishes or sugar snap peas with individual servings of guac     Low sodium cottage with peaches or mandarin oranges     Sunflower seeds with strawberries     Turkey jerky or 100% grass fed beef jerky     Plant based protein bar     Simple Mills Nash Flour Crackers or Libby Gone Cracker dip with hummus  or maxx     Avocado on slice of gluten free bread recommend base culture brand or 100% whole grain bread       Mediterranean Approach: Eat whole, unprocessed real foods in their unprocessed forms such as fruits, vegetables, whole grains (prefer gluten free), nuts, legumes, extra virgin olive oil, spices, modest amounts of poultry, and fish.        Cardiometabolic Food Plan: 6582-7697 calories per day     Protein 6-7 servings per day  - include at each meal to stabilize blood sugars   (Choose 3oz or 21g per meal and aim for 1oz of 7g for snacks)   - Strive for 1-2 servings of fish per week especially of higher omega-3 fatty acid containing fish such as salmon.     Legumes 1-2 serving per day     Dairy alternatives 2-3 serving per day     Nuts and seeds 2-3 servings per day - great to incorporate as snacks     Fats and oils 3-4 servings per day     Non starchy vegetables 8-10 servings per day     Starchy vegetable limit 1 serving per day as they tend to impact blood sugar (they are moderate-GI).     Fruits 2 servings per day - best to couple with a little bit of protein or fat to offset a rise in blood sugars (they are low-moderate-GI foods).     Whole grains 2 serving per day - try gluten free whole grains for more variety    Choose Low Glycemic (GI) foods: Regulate your sugar levels by eating foods that do not spike blood sugars.  Eat low -GI foods so only small fluctuations in blood glucose and insulin levels are produced.      Examples of low-GI foods: nuts, seeds, GF oats, most vegetables especially non-starchy and fruits.     Medium or high-GI foods should be eaten with a protein or fat, both of which blunt the glycemic effect of these foods. This reduces the overall glycemic impact of a meal.   Ex: Most grains and starchy veggies are medium/high GI.       Avoid foods containing refined sugars, artificial sweeteners, and refined grains they are considered high-GI because they lead to sharp increases in blood  sugars levels, which increase insulin sensitivity causing increased TG, and low good cholesterol (HDL).   Ex: cakes, cookies, pies, bread, sodas, fruit drinks, presweetened tea, coffee drinks, energy or sport drinks, flavored milk and other processed foods.     Choose foods high in fiber: Aim for at least 5 grams of fiber per serving of food or a total of 25-35 grams fiber per day. Remember, when looking at the label, you can take the fiber away from the total carbs. Ex:15g of total carbs - 4g of fiber = 11g net carbs     Insoluble fiber acts like a bulky  inner broom,  sweeping out debris from the intestine and creating more motility and movement.      Soluble fiber attracts water and swells, creating a gel that slows digestion.  Also, slows the release of glucose from foods into the blood which stops spikes in blood sugar levels.  Soluble fiber traps toxins in the gut, helping to carry them to excretion and provides healthy bacteria in the digestive tract.     Choose High Quality Fats: Adding anti-inflammatory fats into your diet such as fish (salmon, herring, mackerel, and sardines), omega 3 eggs, hawa seeds, ground flax seeds/milk, hemp seeds/milk, walnuts and some other certain leafy greens will increase omega-3 fats to omeaga-6 fats ratio.     Therapeutic fats both monounsaturated and polyunsaturated to include daily: ground flaxseeds, unsalted mixed nuts, avocados, olives, extra-virgin olive oil.     Emphasize high-quality oils and fats in the diet daily such as avocado oil, coconut oil, flaxseed, olive, sesame. Ex: 1 tsp to 1 tbsp of MCT oil from coconuts can be added into coffee, smoothies, and salad dressings per day.     Avoid trans fats found in processed foods       NUTRITION RESOURCES:  1. IFM Cardiometabolic Packet 1400    Functional Nutrition Fundamentals     Comprehensive Guide    Meal plan with recipes       PATIENT'S BEHAVIOR CHANGE GOALS:   See nutrition intervention for patient stated behavior  change goals.     MONITOR / EVALUATE:  Registered Dietitian will monitor/evaluate the following:     Beliefs and attitudes related to food    Food and nutrition knowledge / skills    Food / Beverage / Nutrient intake     Pertinent Labs    Progress toward meeting stated nutrition-related goals    Readiness to change nutrition-related behaviors    Weight change    Digestion     COORDINATION OF CARE:  Follow up with endocrinology and primary care provider       FOLLOW-UP:  Follow-up appointment scheduled on April 11th at 10am.       Time spent in minutes: 60 minutes 4 units   Encounter: Individual    Serena Caro RD, CLT, LD  Integrative Registered Dietitian

## 2023-02-05 DIAGNOSIS — E11.9 TYPE 2 DIABETES MELLITUS WITHOUT COMPLICATION, WITH LONG-TERM CURRENT USE OF INSULIN (H): ICD-10-CM

## 2023-02-05 DIAGNOSIS — Z79.4 TYPE 2 DIABETES MELLITUS WITHOUT COMPLICATION, WITH LONG-TERM CURRENT USE OF INSULIN (H): ICD-10-CM

## 2023-02-06 DIAGNOSIS — G89.29 CHRONIC PAIN OF RIGHT KNEE: ICD-10-CM

## 2023-02-06 DIAGNOSIS — M17.10 UNILATERAL PRIMARY OSTEOARTHRITIS, UNSPECIFIED KNEE: ICD-10-CM

## 2023-02-06 DIAGNOSIS — M25.561 CHRONIC PAIN OF RIGHT KNEE: ICD-10-CM

## 2023-02-07 ENCOUNTER — PATIENT OUTREACH (OUTPATIENT)
Dept: GERIATRIC MEDICINE | Facility: CLINIC | Age: 68
End: 2023-02-07
Payer: COMMERCIAL

## 2023-02-07 RX ORDER — TRAMADOL HYDROCHLORIDE 50 MG/1
50 TABLET ORAL 2 TIMES DAILY PRN
Qty: 30 TABLET | Refills: 0 | Status: SHIPPED | OUTPATIENT
Start: 2023-02-07 | End: 2023-04-18 | Stop reason: ALTCHOICE

## 2023-02-07 ASSESSMENT — ACTIVITIES OF DAILY LIVING (ADL)
DEPENDENT_IADLS:: CLEANING;COOKING;LAUNDRY;SHOPPING;MEAL PREPARATION;MEDICATION MANAGEMENT;TRANSPORTATION;MONEY MANAGEMENT

## 2023-02-07 NOTE — Clinical Note
FORREST...... Guerline King , I am this Pt's [facility] Care Coordinator. Please see my note for a detailed plan of care. Thanks! Divya Smalls RN,BSN [facility] Case Coordinator  [phone]

## 2023-02-07 NOTE — PROGRESS NOTES
[facility] Care Coordination Contact  [facility] CCDB Assessment   (for members on other waivers)    Home visit for Health Risk Assessment with Holly TORREZ Muir completed on February 7, 2023    Type of residence:: Private home - stairs  Current living arrangement:: I live in a private home with family     Assessment completed with:: Patient    Current Care Plan  Member is a recipient of the CADI Waiver program.  Member currently receiving the following home care services:     Member currently receiving the following community resources: PCA, Meals on Wheels, Lifeline      Medication Review  Medication reconciliation completed in Epic: Yes  Medication set-up & administration: Family/informal caregiver sets up weekly.  Family caregiver administers medications.  Medication Risk Assessment Medication (1 or more, place referral to MTM): Difficulty adhering to medication regimen  MTM Referral Placed: No: Family is assisting with need    Mental/Behavioral Health   Depression Screening:   PHQ-2 Total Score (Adult) - Positive if 3 or more points; Administer PHQ-9 if positive: 1       Mental health DX::Yes, Depression F 32.2         Falls Assessment:   Fallen 2 or more times in the past year?: No        ADL/IADL Dependencies:   Dependent ADLs:: Ambulation-cane, Bathing, Dressing, Transfers, Positioning  Dependent IADLs:: Cleaning, Cooking, Laundry, Shopping, Meal Preparation, Medication Management, Transportation, Money Management    [facility] sponsored benefits: Shared information re: Silver Sneakers/gym memberships, ASA, Calcium +D.    PCA Assessment completed at visit: n/a per CADI      Care Plan & Recommendations: Member will continue recieves     CADI Care Plan/ISP requested from waiver .     Follow-Up Plan: Member informed of future contact, plan to f/u with member with a 6 month telephone assessment.  Contact information shared with member and family, encouraged member to call with any  questions or concerns at any time.    [facility] care continuum providers: Please see Snapshot and Care Management Flowsheets for Specific details of care plan.    The member continues to get ostomy supplies from [facility]. The member uses 3 bags a day. The member asked about a swim appliance. CC encouraged the member to talk with HUE Miranda.      This CC note routed to PCP.     Divya Smalls RN,BSN  [facility] Case Coordinator   [phone]

## 2023-02-08 NOTE — TELEPHONE ENCOUNTER
FREESTYLE CM 2 READER     Last Written Prescription Date:  1/4/23  Last Fill Quantity: 1 ,   # refills: 0    Last Office Visit : 1/4/23 Marcela  Future Office visit:  7/5/23   Duplicate    (SunbaySTYLE CM 2 READER) GALINA #1 / 0 Rf sent to [id number] on 1/4/23

## 2023-02-10 ENCOUNTER — TRANSFERRED RECORDS (OUTPATIENT)
Dept: HEALTH INFORMATION MANAGEMENT | Facility: CLINIC | Age: 68
End: 2023-02-10

## 2023-02-10 LAB — PHQ9 SCORE: 9

## 2023-02-14 ENCOUNTER — PATIENT OUTREACH (OUTPATIENT)
Dept: GERIATRIC MEDICINE | Facility: CLINIC | Age: 68
End: 2023-02-14
Payer: COMMERCIAL

## 2023-02-14 DIAGNOSIS — Z79.4 TYPE 2 DIABETES MELLITUS WITHOUT COMPLICATION, WITH LONG-TERM CURRENT USE OF INSULIN (H): Primary | ICD-10-CM

## 2023-02-14 DIAGNOSIS — E11.9 TYPE 2 DIABETES MELLITUS WITHOUT COMPLICATION, WITH LONG-TERM CURRENT USE OF INSULIN (H): Primary | ICD-10-CM

## 2023-02-14 NOTE — LETTER
February 14, 2023      HOLLY FELDMAN  [address] [facility] [address]      Dear Holly:    At [facility], we re dedicated to improving your health and wellness. Enclosed is the Care Plan developed with you on 2/7/2023. Please review the Care Plan carefully.    As a reminder, during your visit we talked about:    Ways to manage your physical and mental health    Using health care to maintain and improve your health     Your preventive care needs     Remember to contact your care coordinator if you:    Are hospitalized, or plan to be hospitalized     Have a fall      Have a change in your physical or mental health    Need help finding support or services    If you have questions, or don t agree with your Care Plan, call me at [phone]. You can also call me if your needs change. TTY users, call the [place] Relay at (662) or [phone] (zvspio-st-yixxpv relay service).    Sincerely,    Divya Smalls RN    E-mail: [email or web address]  Phone: [phone]      [facility] is a health plan that contracts with both Medicare and the [place] Medical Assistance (Medicaid) program to provide benefits of both programs to enrollees. Enrollment in [facility] depends on contract renewal.    [id number] accepted    [id number] (07/2022)

## 2023-02-14 NOTE — PROGRESS NOTES
[facility] Care Coordination Contact    Received after visit chart from care coordinator.  Completed following tasks: Mailed copy of care plan to client, Updated services in Database and Mailed Safe Medication Disposal      Liz Hendricks  Care Management Specialist   [facility]   [phone]

## 2023-02-16 ENCOUNTER — VIRTUAL VISIT (OUTPATIENT)
Dept: EDUCATION SERVICES | Facility: CLINIC | Age: 68
End: 2023-02-16
Payer: COMMERCIAL

## 2023-02-16 DIAGNOSIS — E11.65 TYPE 2 DIABETES MELLITUS WITH HYPERGLYCEMIA, WITH LONG-TERM CURRENT USE OF INSULIN (H): Primary | ICD-10-CM

## 2023-02-16 DIAGNOSIS — Z79.4 TYPE 2 DIABETES MELLITUS WITH HYPERGLYCEMIA, WITH LONG-TERM CURRENT USE OF INSULIN (H): Primary | ICD-10-CM

## 2023-02-16 DIAGNOSIS — Z79.4 TYPE 2 DIABETES MELLITUS WITHOUT COMPLICATION, WITH LONG-TERM CURRENT USE OF INSULIN (H): ICD-10-CM

## 2023-02-16 DIAGNOSIS — E11.9 TYPE 2 DIABETES MELLITUS WITHOUT COMPLICATION, WITH LONG-TERM CURRENT USE OF INSULIN (H): ICD-10-CM

## 2023-02-16 PROCEDURE — 99207 PR NO BILLABLE SERVICE THIS VISIT: CPT | Mod: 93

## 2023-02-16 RX ORDER — GLIMEPIRIDE 2 MG/1
2 TABLET ORAL
Qty: 180 TABLET | Refills: 1 | Status: SHIPPED | OUTPATIENT
Start: 2023-02-16 | End: 2023-03-07

## 2023-02-16 NOTE — PROGRESS NOTES
Diabetes Self-Management Education & Support Telephone Visit  Holly Muir contacted today for education related to Type 2 diabetes    Patient is being treated with: oral agents and insulin    Diabetes Self-Management Education & Support  Holly Muir presents today for education related to Type 2 diabetes    Patient is being treated with:  oral agents and insulin  She is accompanied by self    Year of diagnosis: 2000  Referring provider:  Dr. Medrano  Living Situation: Lives with daughter in home  Employment: No    PATIENT CONCERNS RELATED TO DIABETES SELF MANAGEMENT: High blood sugars    ASSESSMENT:    Taking Medication:   Current Diabetes Management per Patient:  Taking diabetes medications? Yes. Actos 45 mg, Metformin 500 mg BID, Lantus 42 units    Monitoring  Patient glucose self monitoring as follows: continuously using a continuous glucose monitor (CGM)  BG meter: Freestyle Mathieu 2-Freestyle Brandi, connected.  meter  BG results:        Patient's most recent   Lab Results   Component Value Date    A1C 9.1 01/19/2023    A1C 8.9 11/12/2021      Patient's A1C goal: <7.0    Activity: Future Physical therapy for back and knee pain. No regular program    Healthy Eating:   Patient currently eats 3 meals 2-3 snacks per day   Breakfast 7-8 am: Oatmeal with nuts, strawberry, apple. Water  Lunch: [place], Mexican dish, water  Dinner 6-7 PM: Chicken, green beans, carrots, salad, rice-not often, water, diet soda  Snacks:Pisatachios, Skinny popcorn, Sweet potato chips  Water throughout day  Alcohol: Rare    Sleeping Pattern: Bed at 9 pm, up between 5-6 am    Problem Solving:    Patient is at risk of hypoglycemia?: Frequency is one to two times per week, Feels symptoms when glucose is less than 60 and Usual treatment is Orange juice or orange  Hospitalizations for hyper or hypoglycemia: No    Healthy Coping and Stress Management:   Sources of stress identified by patient  Other (please specify):  None  Coping mechanisms  identified by patient:  Other (please specify):  Shopping with friends, movies, out to eat      EDUCATION and INSTRUCTION PROVIDED AT THIS VISIT:    BG slightly improved since last visit. Mathieu data: TIR 35%, TAR >250=30%, with no lows. Patient was unable to add Glimepiride to dinner due to pharmacy issue. Prescription sent today. She admits to missing Lantus dose at least 2 times this week due to falling asleep early. Discussed setting alarm or changing time of administration to ensure not missing doses. Metformin 500 mg BID. Still reports daily episode of diarrhea unless she eats pistachios. Does admit to missing all diabetes medication 2-3 times per month. Will follow up in 2 weeks after adding Glimepiride to evening meal.     Patient-stated goal written and given to Holly Muir.  Verbalized and demonstrated understanding of instructions.     PLAN:  *Continue Lantus 42 units in evening. Set alarm in case you fall asleep to take insulin  *Glimepiride 2 mg (1 tablet) with breakfast and dinner. Do not take if you aren't going to eat a meal  *Continue Metformin 500 mg (1 tablet) with breakfast and 1 tablet with dinner  *Call if having consistent lows below 70    FOLLOW-UP:    *3/7 at 12:00 PM with Argelia in [place]    Time spent with patient at today's visit was 20 minutes.    Any diabetes medication dose changes were made via the CDE Protocol and Collaborative Practice Agreement with Aly and  Keon.  A copy of this encounter was provided to patient's referring provider.

## 2023-02-16 NOTE — PATIENT INSTRUCTIONS
PLAN:  *Continue Lantus 42 units in evening. Set alarm in case you fall asleep to take insulin  *Glimepiride 2 mg (1 tablet) with breakfast and dinner. Do not take if you aren't going to eat a meal  *Continue Metformin 500 mg (1 tablet) with breakfast and 1 tablet with dinner  *Call if having consistent lows below 70    FOLLOW-UP:    *3/7 at 12:00 PM with Argelia malin [place]

## 2023-02-17 ENCOUNTER — THERAPY VISIT (OUTPATIENT)
Dept: PHYSICAL THERAPY | Facility: CLINIC | Age: 68
End: 2023-02-17
Attending: PEDIATRICS
Payer: COMMERCIAL

## 2023-02-17 DIAGNOSIS — M79.621 PAIN OF RIGHT UPPER ARM: ICD-10-CM

## 2023-02-17 DIAGNOSIS — S49.91XD INJURY OF RIGHT SHOULDER, SUBSEQUENT ENCOUNTER: ICD-10-CM

## 2023-02-17 DIAGNOSIS — M25.511 CHRONIC RIGHT SHOULDER PAIN: ICD-10-CM

## 2023-02-17 DIAGNOSIS — G89.29 CHRONIC RIGHT SHOULDER PAIN: ICD-10-CM

## 2023-02-17 PROCEDURE — 97161 PT EVAL LOW COMPLEX 20 MIN: CPT | Mod: GP | Performed by: PHYSICAL THERAPIST

## 2023-02-17 PROCEDURE — 97110 THERAPEUTIC EXERCISES: CPT | Mod: GP | Performed by: PHYSICAL THERAPIST

## 2023-02-17 NOTE — PROGRESS NOTES
Physical Therapy Initial Evaluation  Subjective:  The history is provided by the patient. No  was used.   Therapist Generated HPI Evaluation  Problem details: Pt reports shoulder is doing better since her injection on 11/9/22. Before the injection she could not even lift her arm up or sleep on her side. Now has pain at about shoulder level with lifting her arm out to the side but is definitely better. .         Type of problem:  Right shoulder.      Condition occurred with:  A fall.  Where condition occurred: at home.  Patient reports pain:  Anterior.  Pain is described as aching and is intermittent (only with movement or lifting).    Since onset symptoms are gradually improving.  Associated symptoms:  Loss of strength and loss of motion/stiffness. Symptoms are exacerbated by lying on extremity, lifting, using arm overhead, using arm at shoulder level and using arm behind back  and relieved by analgesics (shoulder injection).  Special tests included:  CT scan (low grade tear of supraspinatus tendon).  Past treatment: cortisone injection. There was significant improvement following previous treatment.  Barriers include:  None as reported by patient.    Patient Health History  Holly Muir being seen for right shoulder pain.     Problem began: 11/19/2022.   Problem occurred: fall   Pain is reported as 3/10 on pain scale.  General health as reported by patient is fair.  Pertinent medical history includes: depression, diabetes, high blood pressure, implanted device, history of fractures, mental illness and overweight (colonstomy bag).   Red flags:  None as reported by patient.  Medical allergies: morphine.    Other surgery history details: ovary, left knee.    Current medications:  Anti-depressants, anti-inflammatory, high blood pressure medication, sleep medication and pain medication.    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   Primary job tasks include:  Prolonged sitting, pushing/pulling,  repetitive tasks and prolonged standing.   Other job/home tasks details: house sitting. .                                  Objective:  System                   Shoulder Evaluation:  ROM:  AROM:    Flexion:  Left:  150    Right:  135    Abduction:  Left: 180   Right:  150      External Rotation:  Left:  68    Right:  40            Extension/Internal Rotation:  Left:  L1    Right:  L1          Strength:    Flexion: Left:5/5 Strong/pain free    Pain:    Right: 5/5  Strong/pain free     Pain:   Extension:  Left: 5/5  Strong/pain free    Pain:    Right: 5/5    Strong/pain free  Pain:  Abduction:  Left: 5/5  Strong/pain free  Pain:    Right: 4/5   Weak/painful    Pain:  Adduction:  Left: 5/5   Strong/pain free   Pain:    Right: 5/5   Strong/pain free    Pain:  Internal Rotation:  Left:5/5   Strong/pain free    Pain:    Right: 5/5   Strong/pain free    Pain:  External Rotation:   Left:5/5   Strong/pain free    Pain:   Right:4/5   Weak/painful    Pain:        Elbow Flexion:  Left:5/5   Strong/pain free    Pain:    Right:5/5   Strong/pain free    Pain:  Elbow Extension:  Left:5/5   Strong/pain free    Pain:    Right:5/5   Strong/pain free    Pain:  Stability Testing:      Right shoulder stability positive testing:Apprehension  Right shoulder stability negative testing:  Relocation  Special Tests:      Right shoulder positive for the following special tests:Impingement  Right shoulder negative for the following special tests:Rotator cuff tear and Acrimioclavicular    Mobility Tests:    Glenohumeral anterior right:  Hypomobile  Glenohumeral posterior right:  Hypomobile                                             General     ROS    Assessment/Plan:    Patient is a 67 year old female with right side shoulder complaints.    Patient has the following significant findings with corresponding treatment plan.                Diagnosis 1:  Right shoulder pain / impingement  Pain -  hot/cold therapy, manual therapy, self management,  education, directional preference exercise and home program  Decreased ROM/flexibility - manual therapy, therapeutic exercise, therapeutic activity and home program  Decreased joint mobility - manual therapy, therapeutic exercise, therapeutic activity and home program  Decreased strength - therapeutic exercise, therapeutic activities and home program  Decreased function - therapeutic activities and home program    Therapy Evaluation Codes:   1) History comprised of:   Personal factors that impact the plan of care:      None.    Comorbidity factors that impact the plan of care are:      Diabetes, Depression and Implanted device.     Medications impacting care: Anti-depressant, Anti-inflammatory, High blood pressure, Pain and Sleep.  2) Examination of Body Systems comprised of:   Body structures and functions that impact the plan of care:      Shoulder.   Activity limitations that impact the plan of care are:      Bathing, Dressing and Lifting.  3) Clinical presentation characteristics are:   Stable/Uncomplicated.  4) Decision-Making    Low complexity using standardized patient assessment instrument and/or measureable assessment of functional outcome.  Cumulative Therapy Evaluation is: Low complexity.    Previous and current functional limitations:  (See Goal Flow Sheet for this information)    Short term and Long term goals: (See Goal Flow Sheet for this information)     Communication ability:  Patient appears to be able to clearly communicate and understand verbal and written communication and follow directions correctly.  Treatment Explanation - The following has been discussed with the patient:   RX ordered/plan of care  Anticipated outcomes  Possible risks and side effects  This patient would benefit from PT intervention to resume normal activities.   Rehab potential is good.    Frequency:  1 X week, once daily  Duration:  for 8 weeks  Discharge Plan:  Achieve all LTG.  Independent in home treatment  program.  Reach maximal therapeutic benefit.    Please refer to the daily flowsheet for treatment today, total treatment time and time spent performing 1:1 timed codes.

## 2023-02-17 NOTE — PROGRESS NOTES
[facility]    OUTPATIENT Physical Therapy ORTHOPEDIC EVALUATION  PLAN OF TREATMENT FOR OUTPATIENT REHABILITATION  (COMPLETE FOR INITIAL CLAIMS ONLY)  Patient's Last Name, First Name, M.I.  YOB: 1955  [place]  LORE    Provider s Name:  [facility]   Medical Record No.  [id number]   Start of Care Date:  02/17/23   Onset Date:   11/19/22   Treatment Diagnosis:  right shoulder pain Medical Diagnosis:     Injury of right shoulder, subsequent encounter  Pain of right upper arm       Goals:     02/17/23 0500   Treating Provider   Treating Provider Casey Sadler DPT   Contact Information   Procedure Code: The timed procedures billed today were performed sequentially within this encounter. Therapeutic Exercise   Minutes: Therapeutic exercise 16   Rxs Authorized 8 E&T   Rxs Used 1   Diagnosis right shoulder pain   Insurance Medicare   Total Treatment Time (minutes) 40   Timed Code Treatment Minutes 16   SOC Date 02/17/23   Beginning of Cert date period 02/17/23   End of Cert period date 04/14/23   Therapy Frequency 1 x/ week   Predicted Duration of Therapy Intervention (days/wks) 8 weeks   DOI 11/19/22   Date/PN needed/next MD appt 04/14/23   Subjective See Eval   Objective See Eval   Initial Pain level 3/10   Current Pain level 3/10   PTRX Therapeutic Exercise   Therapeutic Exercise 1 Wall Climb   Therapeutic Exercise Notes 1 x 10   Therapeutic Exercise 2 Shoulder Theraband Internal Rotation   Therapeutic Exercise Notes 2 YTB x 8    Therapeutic Exercise 3 Shoulder Theraband External Rotation   Therapeutic Exercise Notes 3 x 8  YTB          Therapy Frequency:  1 x/ week  Predicted Duration of Therapy Intervention:  8 weeks    Casey Sadler, PT                 I CERTIFY THE NEED FOR THESE SERVICES FURNISHED UNDER        THIS PLAN OF TREATMENT AND WHILE UNDER MY CARE      (Physician attestation of this document indicates review and certification of the therapy plan).                     Certification Date From:  02/17/23   Certification Date To:  04/14/23    Referring Provider:  Christopher Sanderson    Initial Assessment        See Epic Evaluation SOC Date: 02/17/23

## 2023-02-21 DIAGNOSIS — M54.16 LUMBAR RADICULOPATHY: ICD-10-CM

## 2023-02-21 NOTE — TELEPHONE ENCOUNTER
Medication Question or Refill    Contacts       Type Contact Phone/Fax    02/21/2023 10:50 AM CST Fax (Incoming) [facility]/pharmacy #[id number] - [place] - [address] AT [facility] (Pharmacy) [phone]          What medication are you calling about (include dose and sig)?: celecoxib (CELEBREX) 100 MG capsule        Preferred Pharmacy:  [facility]/pharmacy #[id number] - [place] - [address] AT [place] [address]  [place] [address]  Phone: [phone] Fax: [phone]      Controlled Substance Agreement on file:   CSA -- Patient Level:     [Media Unavailable] Controlled Substance Agreement - Opioid - Scan on 4/15/2021  9:53 AM       Who prescribed the medication?: Tiffany King    Do you need a refill? Yes

## 2023-02-22 RX ORDER — CELECOXIB 100 MG/1
100 CAPSULE ORAL 2 TIMES DAILY
Qty: 60 CAPSULE | Refills: 2 | Status: SHIPPED | OUTPATIENT
Start: 2023-02-22 | End: 2023-03-10

## 2023-02-23 ENCOUNTER — PATIENT OUTREACH (OUTPATIENT)
Dept: GERIATRIC MEDICINE | Facility: CLINIC | Age: 68
End: 2023-02-23

## 2023-02-23 NOTE — LETTER
February 23, 2023    HOLLY FELDMAN  [address] [facility] [address]      Dear Holly:    As a member of [facility] ([facility]), you are provided a care coordinator. I will be your new care coordinator as of 03/01/2023. I will be calling you soon to see how you are doing and determine your needs.    If you have any questions, please feel free to call me at [phone]. If you reach my voice mail, please leave a message and your phone number. If you are hearing impaired, please call the [place] Relay at 714 or [phone] (aoaufn-ak-edqtaz relay service).    I look forward to speaking with you soon.    Sincerely,      Malathi Bacon RN, N  [phone]  [email or web address]      [facility] is a health plan that contracts with both Medicare and the [place] Medical Assistance (Medicaid) program to provide benefits of both programs to enrollees. Enrollment in [facility] depends on contract renewal.      [facility]  [id number] DHS Approved ([id number])  [id number] (11/18)

## 2023-02-23 NOTE — PROGRESS NOTES
[facility] Care Coordination Contact    Internal CC change effective 03/01/2023.  Mailed member CC Change letter.  Additional tasks to be completed by CMS include: update database & EPIC, enter CC Change in MMIS, and move member files on [employer] drive.

## 2023-02-25 ENCOUNTER — NURSE TRIAGE (OUTPATIENT)
Dept: NURSING | Facility: CLINIC | Age: 68
End: 2023-02-25
Payer: COMMERCIAL

## 2023-02-25 NOTE — TELEPHONE ENCOUNTER
Call from patient who has right knee pain that started in  December. She reports pain level 10/10 when walking and 6/10 when elevated and sitting. She reports she was told she has arthritis in her knee. She is taking ibuprofen this morning 800 mg, last took it in the morning. She also uses ice for the pain. She says she was using tramadol but wants to be prescribed something stronger.     Assessment/Disposition: be seen in the ED if she wants to get pain medication. Advised to call Dr. Harrington on Monday.    Reviewed care advice with caller per RN triage protocol guideline. Caller verbalized understanding.            Jordan Waldrop, RN, BSN  Triage Nurse Advisor          Reason for Disposition    [1] SEVERE pain (e.g., excruciating, unable to walk) AND [2] not improved after 2 hours of pain medicine    Additional Information    Negative: Sounds like a life-threatening emergency to the triager    Negative: [1] Swollen joint AND [2] fever    Negative: [1] Red area or streak AND [2] fever    Negative: Patient sounds very sick or weak to the triager    Protocols used: KNEE PAIN-A-AH

## 2023-02-27 ENCOUNTER — OFFICE VISIT (OUTPATIENT)
Dept: ORTHOPEDICS | Facility: CLINIC | Age: 68
End: 2023-02-27
Payer: COMMERCIAL

## 2023-02-27 DIAGNOSIS — M17.11 PRIMARY OSTEOARTHRITIS OF RIGHT KNEE: Primary | ICD-10-CM

## 2023-02-27 PROCEDURE — 20610 DRAIN/INJ JOINT/BURSA W/O US: CPT | Mod: RT | Performed by: FAMILY MEDICINE

## 2023-02-27 PROCEDURE — 99213 OFFICE O/P EST LOW 20 MIN: CPT | Mod: 25 | Performed by: FAMILY MEDICINE

## 2023-02-27 RX ORDER — HYDROCODONE BITARTRATE AND ACETAMINOPHEN 5; 325 MG/1; MG/1
1 TABLET ORAL EVERY 6 HOURS PRN
Qty: 20 TABLET | Refills: 0 | Status: SHIPPED | OUTPATIENT
Start: 2023-02-27 | End: 2023-03-02

## 2023-02-27 RX ORDER — TRIAMCINOLONE ACETONIDE 40 MG/ML
40 INJECTION, SUSPENSION INTRA-ARTICULAR; INTRAMUSCULAR
Status: DISCONTINUED | OUTPATIENT
Start: 2023-02-27 | End: 2023-07-05

## 2023-02-27 RX ADMIN — TRIAMCINOLONE ACETONIDE 40 MG: 40 INJECTION, SUSPENSION INTRA-ARTICULAR; INTRAMUSCULAR at 11:12

## 2023-02-27 ASSESSMENT — PAIN SCALES - GENERAL: PAINLEVEL: WORST PAIN (10)

## 2023-02-27 NOTE — PROGRESS NOTES
HISTORY OF PRESENT ILLNESS  Ms. Muir is a pleasant 67 year old female following up with right knee osteoarthritis.  Holly has unfortunately been experiencing extreme pain over the past couple of months.  This has been worsening on a daily to weekly basis.  She feels extreme pain with any weightbearing, this is preventing her from sleeping and doing activities of daily living at home.     PHYSICAL EXAM  General  - normal appearance, in no obvious distress  Musculoskeletal - right knee  - stance: antalgic gait  - inspection: trace effusion  - palpation: medial joint line tenderness  - ROM: 120 degrees flexion, 0 degrees extension, painful active ROM  - strength: 5/5 in flexion, 5/5 in extension  - special tests:  (-) Mahin  (-) varus at 0 and 30 degrees flexion  (-) valgus at 0 and 30 degrees flexion  Neuro  - no sensory or motor deficit, grossly normal coordination, normal muscle tone       ASSESSMENT & PLAN  Ms. Muir is a 67 year old female following up with right knee osteoarthritis    We revisited treatment options including injection based treatments, analgesics, and the role of a surgical consult.    I did again inject her knee today.  I am also referring her to our surgeons to discuss the role of total knee arthroplasty.  Lastly, I did provide her pain medicine for the short-term.    It was a pleasure seeing Holly.        Santy Harrington DO, CAQSM      This note was constructed using Dragon dictation software, please excuse any minor errors in spelling, grammar, or syntax.    Large Joint Injection/Arthocentesis: R knee joint    Date/Time: 2/27/2023 11:12 AM  Performed by: Santy Harrington DO  Authorized by: Santy Harrington DO     Indications:  Pain  Needle Size:  22 G  Guidance: landmark guided    Approach:  Lateral  Location:  Knee      Medications:  40 mg triamcinolone 40 MG/ML  Outcome:  Tolerated well, no immediate complications  Procedure discussed: discussed risks, benefits, and alternatives     Consent Given by:  Patient  Timeout: timeout called immediately prior to procedure    Prep: patient was prepped and draped in usual sterile fashion       PROCEDURE    Knee Injection - Intraarticular  The patient was informed of the risks and the benefits of the procedure and a written consent was signed.  The patient s right knee was prepped with chlorhexidine in sterile fashion.   40 mg of triamcinolone suspension was drawn up into a 5 mL syringe with 4 mL of 1% lidocaine.  Injection was performed using substerile technique.  A 1.5-inch 22-gauge needle was used to enter the lateral aspect of the knee.  Injection performed successfully without difficulty.  There were no complications. The patient tolerated the procedure well. There was negligible bleeding.

## 2023-02-27 NOTE — LETTER
2/27/2023         RE: Holly Muir  [address] [facility] [id number]        Dear Colleague,    Thank you for referring your patient, Holly Muir, to the [facility] SPORTS MEDICINE CLINIC [place]. Please see a copy of my visit note below.    HISTORY OF PRESENT ILLNESS  Ms. Muir is a pleasant 67 year old female following up with right knee osteoarthritis.  Holly has unfortunately been experiencing extreme pain over the past couple of months.  This has been worsening on a daily to weekly basis.  She feels extreme pain with any weightbearing, this is preventing her from sleeping and doing activities of daily living at home.     PHYSICAL EXAM  General  - normal appearance, in no obvious distress  Musculoskeletal - right knee  - stance: antalgic gait  - inspection: trace effusion  - palpation: medial joint line tenderness  - ROM: 120 degrees flexion, 0 degrees extension, painful active ROM  - strength: 5/5 in flexion, 5/5 in extension  - special tests:  (-) Mahin  (-) varus at 0 and 30 degrees flexion  (-) valgus at 0 and 30 degrees flexion  Neuro  - no sensory or motor deficit, grossly normal coordination, normal muscle tone       ASSESSMENT & PLAN  Ms. Muir is a 67 year old female following up with right knee osteoarthritis    We revisited treatment options including injection based treatments, analgesics, and the role of a surgical consult.    I did again inject her knee today.  I am also referring her to our surgeons to discuss the role of total knee arthroplasty.  Lastly, I did provide her pain medicine for the short-term.    It was a pleasure seeing Holly.        Santy Harrington DO, CAQSM      This note was constructed using Dragon dictation software, please excuse any minor errors in spelling, grammar, or syntax.    Large Joint Injection/Arthocentesis: R knee joint    Date/Time: 2/27/2023 11:12 AM  Performed by: Santy Harrington DO  Authorized by: Santy Harrington DO      Indications:  Pain  Needle Size:  22 G  Guidance: landmark guided    Approach:  Lateral  Location:  Knee      Medications:  40 mg triamcinolone 40 MG/ML  Outcome:  Tolerated well, no immediate complications  Procedure discussed: discussed risks, benefits, and alternatives    Consent Given by:  Patient  Timeout: timeout called immediately prior to procedure    Prep: patient was prepped and draped in usual sterile fashion       PROCEDURE    Knee Injection - Intraarticular  The patient was informed of the risks and the benefits of the procedure and a written consent was signed.  The patient s right knee was prepped with chlorhexidine in sterile fashion.   40 mg of triamcinolone suspension was drawn up into a 5 mL syringe with 4 mL of 1% lidocaine.  Injection was performed using substerile technique.  A 1.5-inch 22-gauge needle was used to enter the lateral aspect of the knee.  Injection performed successfully without difficulty.  There were no complications. The patient tolerated the procedure well. There was negligible bleeding.                   Again, thank you for allowing me to participate in the care of your patient.        Sincerely,        Santy Harrington DO

## 2023-02-27 NOTE — NURSING NOTE
[facility]   ORTHOPEDICS & SPORTS MEDICINE  [address] [place] [address]  Dept: [phone]  ______________________________________________________________________________    Patient: Holly Muir   : 1955   MRN: [id number]   2023    INVASIVE PROCEDURE SAFETY CHECKLIST    Date: 2023   Procedure: Right knee injection   Patient Name: Holly Muir  MRN: [id number]  YOB: 1955    Action: Complete sections as appropriate. Any discrepancy results in a HARD COPY until resolved.     PRE PROCEDURE:  Patient ID verified with 2 identifiers (name and  or MRN): Yes  Procedure and site verified with patient/designee (when able): Yes  Accurate consent documentation in medical record: Yes  H&P (or appropriate assessment) documented in medical record: Yes  H&P must be up to 20 days prior to procedure and updates within 24 hours of procedure as applicable: NA  Relevant diagnostic and radiology test results appropriately labeled and displayed as applicable: NA  Procedure site(s) marked with provider initials: NA    TIMEOUT:  Time-Out performed immediately prior to starting procedure, including verbal and active participation of all team members addressing the following:Yes  * Correct patient identify  * Confirmed that the correct side and site are marked  * An accurate procedure consent form  * Agreement on the procedure to be done  * Correct patient position  * Relevant images and results are properly labeled and appropriately displayed  * The need to administer antibiotics or fluids for irrigation purposes during the procedure as applicable   * Safety precautions based on patient history or medication use    DURING PROCEDURE: Verification of correct person, site, and procedures any time the responsibility for care of the patient is transferred to another member of the care team.       Prior to injection, verified patient identity using patient's name and date of  birth.  Due to injection administration, patient instructed to remain in clinic for 15 minutes  afterwards, and to report any adverse reaction to me immediately.    Joint injection was performed.      Drug Amount Wasted:  None.  Vial/Syringe: Single dose vial  Expiration Date:  9/30/2024      Francesco Toledo, EMT  February 27, 2023

## 2023-03-03 ENCOUNTER — THERAPY VISIT (OUTPATIENT)
Dept: PHYSICAL THERAPY | Facility: CLINIC | Age: 68
End: 2023-03-03
Payer: COMMERCIAL

## 2023-03-03 DIAGNOSIS — M25.511 CHRONIC RIGHT SHOULDER PAIN: Primary | ICD-10-CM

## 2023-03-03 DIAGNOSIS — G89.29 CHRONIC RIGHT SHOULDER PAIN: Primary | ICD-10-CM

## 2023-03-03 PROCEDURE — 97140 MANUAL THERAPY 1/> REGIONS: CPT | Mod: GP | Performed by: PHYSICAL THERAPIST

## 2023-03-03 PROCEDURE — 97110 THERAPEUTIC EXERCISES: CPT | Mod: GP | Performed by: PHYSICAL THERAPIST

## 2023-03-07 ENCOUNTER — ALLIED HEALTH/NURSE VISIT (OUTPATIENT)
Dept: EDUCATION SERVICES | Facility: CLINIC | Age: 68
End: 2023-03-07
Payer: COMMERCIAL

## 2023-03-07 DIAGNOSIS — Z79.4 TYPE 2 DIABETES MELLITUS WITHOUT COMPLICATION, WITH LONG-TERM CURRENT USE OF INSULIN (H): ICD-10-CM

## 2023-03-07 DIAGNOSIS — E11.9 TYPE 2 DIABETES MELLITUS WITHOUT COMPLICATION, WITH LONG-TERM CURRENT USE OF INSULIN (H): ICD-10-CM

## 2023-03-07 DIAGNOSIS — E11.40 TYPE 2 DIABETES MELLITUS WITH DIABETIC NEUROPATHY (H): ICD-10-CM

## 2023-03-07 PROCEDURE — G0108 DIAB MANAGE TRN  PER INDIV: HCPCS

## 2023-03-07 RX ORDER — GLIMEPIRIDE 2 MG/1
4 TABLET ORAL
Qty: 180 TABLET | Refills: 1 | Status: SHIPPED | OUTPATIENT
Start: 2023-03-07 | End: 2023-07-20

## 2023-03-07 RX ORDER — INSULIN GLARGINE 100 [IU]/ML
INJECTION, SOLUTION SUBCUTANEOUS
Qty: 45 ML | Refills: 1 | Status: SHIPPED | OUTPATIENT
Start: 2023-03-07 | End: 2023-08-18

## 2023-03-07 NOTE — PROGRESS NOTES
Diabetes Self-Management Education & Support Telephone Visit  Holly Muir contacted today for education related to Type 2 diabetes    Patient is being treated with: oral agents and insulin    Diabetes Self-Management Education & Support  Holly Muir presents today for education related to Type 2 diabetes    Patient is being treated with:  oral agents and insulin  She is accompanied by self    Year of diagnosis: 2000  Referring provider:  Dr. Medrano  Living Situation: Lives with daughter in home  Employment: No    PATIENT CONCERNS RELATED TO DIABETES SELF MANAGEMENT: High blood sugars, snacking too much    ASSESSMENT:    Taking Medication:   Current Diabetes Management per Patient:  Taking diabetes medications? Yes. Actos 45 mg, Metformin 500 mg BID, Lantus 42 units    Monitoring  Patient glucose self monitoring as follows: continuously using a continuous glucose monitor (CGM)  BG meter: Freestyle Mathieu [employer] Brandi, connected.  meter  BG results:        Patient's most recent   Lab Results   Component Value Date    A1C 9.1 01/19/2023    A1C 8.9 11/12/2021      Patient's A1C goal: <7.0    Activity: Future Physical therapy for back and knee pain. No regular program    Healthy Eating:   Patient currently eats 3 meals 2-3 snacks per day   Breakfast 7-8 am: Oatmeal with nuts, strawberry, apple. Water  Lunch: [place], Mexican dish, water  Dinner 6-7 PM: Chicken, green beans, carrots, salad, rice-not often, water, diet soda  Snacks:Pisatachios, Skinny popcorn, Sweet potato chips, nuts, veggie straws  Water throughout day  Alcohol: Rare    Sleeping Pattern: Bed at 9 pm, up between 5-6 am    Problem Solving:    Patient is at risk of hypoglycemia?: Frequency is one to two times per week, Feels symptoms when glucose is less than 60 and Usual treatment is Orange juice or orange  Hospitalizations for hyper or hypoglycemia: No    Healthy Coping and Stress Management:   Sources of stress identified by patient  Other  (please specify):  None  Coping mechanisms identified by patient:  Other (please specify):  Shopping with friends, movies, out to eat      EDUCATION and INSTRUCTION PROVIDED AT THIS VISIT:    Poor controlled T2 returns to evaluate BG after adding Glimepiride with evening meal. Mathieu data: TIR 7%, TAR >250=62%, with no lows. Primarily above 250 most of day. She denies missing Lantus doses; daughter set reminder on phone. Has not missed Metformin or Glimepiride doses in past few weeks. Admits to sneaking food (grapes, peanut butter and jelly) while daughters are at work. Discussed better snack options and provided snack idea handout. She is not rotating injection sites, typically uses right side of abdomen, on assessment soft and without lumps. Reviewed importance of rotating sites. Had patient demonstrate how she injects medicine and this was done correctly. Instructed patient to ask daughters how long Lantus pen has been at room temperature and reminded pen is good for 28 days. Will increase Lantus to 45 units daily and increase Glimepiride to 4 mg BID. Advised to contact clinic if lows occur. Follow up with educator in 3 weeks.    Patient-stated goal written and given to Holly Muir.  Verbalized and demonstrated understanding of instructions.     Education Material Provided: Snack ideas    PLAN:  *Increase Lantus to 45 units daily  *Increase Glimepiride to 4 mg (2 tablets) before breakfast and dinner  *Make sure to rotate injection sites  *Check to see when Lantus pen was taken from refrigerator. At room temperature, pen is good for 28 days  *Let me know if you start to have blood sugars less than 70    FOLLOW-UP:    *3/28 with Argelia after appointment with Dr. Sheehan (check to see if daughter could come with to visit)    Time spent with patient at today's visit was 40 minutes.      Any diabetes medication dose changes were made via the CDE Protocol and Collaborative Practice Agreement with [facility].  A copy of this encounter was provided to patient's referring provider-Marcela

## 2023-03-07 NOTE — PATIENT INSTRUCTIONS
PLAN:  *Increase Lantus to 45 units daily  *Increase Glimepiride to 4 mg (2 tablets) before breakfast and dinner  *Make sure to rotate injection sites  *Check to see when Lantus pen was taken from refrigerator. At room temperature, pen is good for 28 days  *Let me know if you start to have blood sugars less than 70    FOLLOW-UP:    *3/28 with Argelia after appointment with Dr. Sheehan (check to see if daughter could come with to visit)    Argelia Maciel RN, Diabetes Educator  Diabetes Education Department  [facility] [place]  [phone]

## 2023-03-09 ENCOUNTER — OFFICE VISIT (OUTPATIENT)
Dept: ORTHOPEDICS | Facility: CLINIC | Age: 68
End: 2023-03-09
Payer: COMMERCIAL

## 2023-03-09 ENCOUNTER — THERAPY VISIT (OUTPATIENT)
Dept: PHYSICAL THERAPY | Facility: CLINIC | Age: 68
End: 2023-03-09
Payer: COMMERCIAL

## 2023-03-09 DIAGNOSIS — M54.16 LUMBAR RADICULOPATHY: ICD-10-CM

## 2023-03-09 DIAGNOSIS — M17.11 PRIMARY OSTEOARTHRITIS OF RIGHT KNEE: Primary | ICD-10-CM

## 2023-03-09 DIAGNOSIS — M25.511 CHRONIC RIGHT SHOULDER PAIN: Primary | ICD-10-CM

## 2023-03-09 DIAGNOSIS — G89.29 CHRONIC RIGHT SHOULDER PAIN: Primary | ICD-10-CM

## 2023-03-09 PROCEDURE — 97110 THERAPEUTIC EXERCISES: CPT | Mod: GP | Performed by: PHYSICAL THERAPIST

## 2023-03-09 PROCEDURE — 97140 MANUAL THERAPY 1/> REGIONS: CPT | Mod: GP | Performed by: PHYSICAL THERAPIST

## 2023-03-09 PROCEDURE — 99214 OFFICE O/P EST MOD 30 MIN: CPT | Performed by: FAMILY MEDICINE

## 2023-03-09 RX ORDER — HYDROCODONE BITARTRATE AND ACETAMINOPHEN 5; 325 MG/1; MG/1
1 TABLET ORAL EVERY 6 HOURS PRN
Qty: 30 TABLET | Refills: 0 | Status: SHIPPED | OUTPATIENT
Start: 2023-03-09 | End: 2023-03-21

## 2023-03-09 ASSESSMENT — PAIN SCALES - GENERAL: PAINLEVEL: SEVERE PAIN (7)

## 2023-03-09 NOTE — PROGRESS NOTES
HISTORY OF PRESENT ILLNESS  Ms. Muir is a pleasant 67 year old female following up with right knee osteoarthritis.  Holly saw me on February 27, I injected her right knee at that visit.  This did help somewhat with her pain, although her pain still continues.  She feels fairly severe pain whenever she stands and bears weight, this is better when she extends her knee, although this then gets stiff fairly quickly.  Pain radiates up her leg and down her leg at times.     PHYSICAL EXAM  General  - normal appearance, in no obvious distress  Musculoskeletal - right knee  - stance: mildly antalgic gait  - inspection: trace effusion  - palpation: medial joint line tenderness  - ROM: 120 degrees flexion, 0 degrees extension, painful active ROM  - strength: 5/5 in flexion, 5/5 in extension  - special tests:  (-) Mahin  (-) varus at 0 and 30 degrees flexion  (-) valgus at 0 and 30 degrees flexion  Neuro  - no sensory or motor deficit, grossly normal coordination, normal muscle tone       ASSESSMENT & PLAN  Ms. Muir is a 67 year old female following up with right knee osteoarthritis.    I had a good discussion with Mera centering around her pain.  At this point she has failed a corticosteroid injection, physical therapy, oral analgesics, and is now having difficulty with her daily activities.  She does have a surgical appointment coming up in just over 2 weeks, prior to this appointment I am refilling her pain medicine.  I also gave her a knee brace to use as helpful.    Lastly, we did discuss that given her history of lumbar radiculopathy it might not be unreasonable to refer her for a repeat injection, as this has helped with her leg pain in the past.  Hopefully this can be diagnostic and a sense, she can have this done at her earliest convenience as well.    It was a pleasure seeing Holly.        Santy Harrington, , CAQSM      This note was constructed using Dragon dictation software, please excuse any minor errors in  spelling, grammar, or syntax.

## 2023-03-09 NOTE — LETTER
3/9/2023         RE: Holly Muir  [address] [facility] [address]        Dear Colleague,    Thank you for referring your patient, Holly Muir, to the [facility] SPORTS MEDICINE CLINIC [place]. Please see a copy of my visit note below.    HISTORY OF PRESENT ILLNESS  Ms. Muir is a pleasant 67 year old female following up with right knee osteoarthritis.  Holly saw me on February 27, I injected her right knee at that visit.  This did help somewhat with her pain, although her pain still continues.  She feels fairly severe pain whenever she stands and bears weight, this is better when she extends her knee, although this then gets stiff fairly quickly.  Pain radiates up her leg and down her leg at times.     PHYSICAL EXAM  General  - normal appearance, in no obvious distress  Musculoskeletal - right knee  - stance: mildly antalgic gait  - inspection: trace effusion  - palpation: medial joint line tenderness  - ROM: 120 degrees flexion, 0 degrees extension, painful active ROM  - strength: 5/5 in flexion, 5/5 in extension  - special tests:  (-) Mahin  (-) varus at 0 and 30 degrees flexion  (-) valgus at 0 and 30 degrees flexion  Neuro  - no sensory or motor deficit, grossly normal coordination, normal muscle tone       ASSESSMENT & PLAN  Ms. Muir is a 67 year old female following up with right knee osteoarthritis.    I had a good discussion with Mera centering around her pain.  At this point she has failed a corticosteroid injection, physical therapy, oral analgesics, and is now having difficulty with her daily activities.  She does have a surgical appointment coming up in just over 2 weeks, prior to this appointment I am refilling her pain medicine.  I also gave her a knee brace to use as helpful.    Lastly, we did discuss that given her history of lumbar radiculopathy it might not be unreasonable to refer her for a repeat injection, as this has helped with her leg pain in  the past.  Hopefully this can be diagnostic and a sense, she can have this done at her earliest convenience as well.    It was a pleasure seeing Holly.        Santy Harrington DO, CAM      This note was constructed using Dragon dictation software, please excuse any minor errors in spelling, grammar, or syntax.        Again, thank you for allowing me to participate in the care of your patient.        Sincerely,        Santy Harrington DO

## 2023-03-09 NOTE — NURSING NOTE
Chief Complaint   Patient presents with     Right Knee - Pain     Left Knee - Pain       There were no vitals filed for this visit.    There is no height or weight on file to calculate BMI.      LYNDSAY Maya NREMT

## 2023-03-10 ENCOUNTER — ANCILLARY ORDERS (OUTPATIENT)
Dept: PALLIATIVE MEDICINE | Facility: CLINIC | Age: 68
End: 2023-03-10

## 2023-03-10 DIAGNOSIS — M54.16 LUMBAR RADICULOPATHY: ICD-10-CM

## 2023-03-10 RX ORDER — CELECOXIB 100 MG/1
100 CAPSULE ORAL 2 TIMES DAILY
Qty: 60 CAPSULE | Refills: 2 | Status: SHIPPED | OUTPATIENT
Start: 2023-03-10 | End: 2023-05-18 | Stop reason: ALTCHOICE

## 2023-03-13 ENCOUNTER — TELEPHONE (OUTPATIENT)
Dept: ORTHOPEDICS | Facility: CLINIC | Age: 68
End: 2023-03-13
Payer: COMMERCIAL

## 2023-03-13 NOTE — TELEPHONE ENCOUNTER
[facility] Call Center    Phone Message    May a detailed message be left on voicemail: yes     Reason for Call: Other: Please call pt for arrival time for procedure on 03/23      Action Taken: Other: ortho     Travel Screening: Not Applicable

## 2023-03-16 ENCOUNTER — THERAPY VISIT (OUTPATIENT)
Dept: PHYSICAL THERAPY | Facility: CLINIC | Age: 68
End: 2023-03-16
Payer: COMMERCIAL

## 2023-03-16 DIAGNOSIS — M25.511 CHRONIC RIGHT SHOULDER PAIN: Primary | ICD-10-CM

## 2023-03-16 DIAGNOSIS — G89.29 CHRONIC RIGHT SHOULDER PAIN: Primary | ICD-10-CM

## 2023-03-16 PROCEDURE — 97110 THERAPEUTIC EXERCISES: CPT | Mod: GP | Performed by: PHYSICAL THERAPIST

## 2023-03-16 NOTE — PROGRESS NOTES
Subjective:  HPI  Physical Exam                    Objective:  System    Physical Exam    General     ROS    Assessment/Plan:    DISCHARGE REPORT    Progress reporting period is from 2/17/23 to 3/16/23.       SUBJECTIVE  Patient reports her shoulder has been feeling great. A little bit of strain with ER. Really not much of an issue. Struggling more with her knee at this time.    Current pain level is 1/10  .     Initial Pain level: 3/10.   Changes in function:  Yes (See Goal flowsheet attached for changes in current functional level)  Adverse reaction to treatment or activity: None    OBJECTIVE  Changes noted in objective findings:  Yes, flex 152, abd 162, ER 60, ext/IR T9 bilaterally, flex 5/5 no pain, Abd 4+/5 with slight pain, ER 5/5 no pain, IR 5/5.     ASSESSMENT/PLAN  Updated problem list and treatment plan: Diagnosis 1:  Right shoulder pain  Decreased strength - home program  STG/LTGs have been met or progress has been made towards goals:  Yes (See Goal flow sheet completed today.)  Assessment of Progress: The patient has met all of their long term goals.  Self Management Plans:  Patient is independent in a home treatment program.  I have re-evaluated this patient and find that the nature, scope, duration and intensity of the therapy is appropriate for the medical condition of the patient.  Holly continues to require the following intervention to meet STG and LTG's:  PT intervention is no longer required to meet STG/LTG.    Recommendations:  This patient is ready to be discharged from therapy and continue their home treatment program.    Please refer to the daily flowsheet for treatment today, total treatment time and time spent performing 1:1 timed codes.

## 2023-03-23 ENCOUNTER — HOSPITAL ENCOUNTER (OUTPATIENT)
Dept: GENERAL RADIOLOGY | Facility: CLINIC | Age: 68
Discharge: HOME | End: 2023-03-23
Attending: ANESTHESIOLOGY | Admitting: ANESTHESIOLOGY
Payer: COMMERCIAL

## 2023-03-23 ENCOUNTER — HOSPITAL ENCOUNTER (OUTPATIENT)
Facility: CLINIC | Age: 68
Discharge: HOME | End: 2023-03-23
Attending: ANESTHESIOLOGY | Admitting: ANESTHESIOLOGY
Payer: COMMERCIAL

## 2023-03-23 VITALS
RESPIRATION RATE: 20 BRPM | TEMPERATURE: 97.9 F | DIASTOLIC BLOOD PRESSURE: 69 MMHG | SYSTOLIC BLOOD PRESSURE: 133 MMHG | HEART RATE: 69 BPM | OXYGEN SATURATION: 97 %

## 2023-03-23 DIAGNOSIS — M54.16 LUMBAR RADICULOPATHY: ICD-10-CM

## 2023-03-23 PROCEDURE — 250N000011 HC RX IP 250 OP 636: Performed by: ANESTHESIOLOGY

## 2023-03-23 PROCEDURE — 999N000179 XR SURGERY CARM FLUORO LESS THAN 5 MIN W STILLS: Mod: TC

## 2023-03-23 PROCEDURE — 64483 NJX AA&/STRD TFRM EPI L/S 1: CPT | Mod: RT | Performed by: ANESTHESIOLOGY

## 2023-03-23 RX ORDER — TRIAMCINOLONE ACETONIDE 40 MG/ML
INJECTION, SUSPENSION INTRA-ARTICULAR; INTRAMUSCULAR PRN
Status: DISCONTINUED | OUTPATIENT
Start: 2023-03-23 | End: 2023-03-23 | Stop reason: HOSPADM

## 2023-03-23 RX ORDER — IOPAMIDOL 612 MG/ML
INJECTION, SOLUTION INTRATHECAL PRN
Status: DISCONTINUED | OUTPATIENT
Start: 2023-03-23 | End: 2023-03-23 | Stop reason: HOSPADM

## 2023-03-23 ASSESSMENT — ACTIVITIES OF DAILY LIVING (ADL): ADLS_ACUITY_SCORE: 35

## 2023-03-23 NOTE — DISCHARGE INSTRUCTIONS

## 2023-03-23 NOTE — OP NOTE
PRIMARY PROBLEM: Low back pain and right lower extremity radicular pains    INDICATIONS FOR PROCEDURE:   1.This patient suffers from moderate to severe low back pain and right lower extremity radicular pains.    2.This pain has persisted for more than 4 weeks and is causing significant functional disability when they are trying to perform ADL's.   3. They failed conservative care which consisted of giving this pain time to adore and medications  4. Preoperative NRS pain score was verbally reported to me today as a 7 /10.   5. The patients radicular pains correlates to their CT scan which shows a pars defect and foraminal stenosis at the L5-S1 level  6.  An order was sent to me to perform the technical component of a lumbar epidural steroid injection.    PROCEDURE: Right L5-S1  Transforaminal Epidural Steroid Injection with fluoroscopic guidance and contrast.     PROCEDURE DETAILS: After written informed consent was obtained from the patient, the patient was escorted to the procedure room.  The patient was placed in the prone position.  A  time out  was conducted to verify patient identity, procedure to be performed, side, site, allergies and any special requirements.  The skin over the thoracolumbar region was prepped and draped in normal sterile fashion with ChloraPrep. Fluoroscopy was used to identify the neural foramen in AP view and the skin was anesthetized with 2 mL of 1% lidocaine with bicarbonate buffer. A 22-gauge Quincke spinal needle was advanced through this location and advanced under fluoroscopic guidance towards the neural foramen.  The target zone was the 6 o clock position of the pedicle.   Prior to entering the foramen, the depth of the needle was gauged with a lateral view on fluoroscopy. While still in a lateral view, the needle was slowly advanced to avoid injury to the spinal nerve.  Then, in the oblique view (approximately 28 degrees), after negative aspiration, 1.5 mL of Omnipaque contrast  dye was injected revealing epidural spread without evidence of intravascular or intrathecal spread.  Then a 3cc solution of 40 mg of Triamcinolone in 2 mL of  Preservative-Free saline was slowly injected into the epidural space at each segment.  After injection of the medication, as the needle tip was withdrawn, it was flushed with local anesthetic.   The patient was monitored with blood pressure and pulse oximetry machines with the assistance of an RN throughout the procedure.  The patient was alert and responsive to questions throughout the procedure.   The patient tolerated the procedure well and was observed in the post-procedural area.  The patient was dismissed without apparent complications.     BLOOD LOSS: < 5 cc    DIAGNOSIS:  1.  Right L5 radiculopathy secondary to L5-S1 foraminal stenosis  2.  L5-S1 pars defect    PLAN:  1. Performed a technically successful right L5-S1  transforaminal epidural steroid injection.  2. The patient was instructed to follow-up with the referring provider and discharge instructions were given for post-operative care.  If this injection is not helpful she may want to consider having her stimulator removed so she can get an MRI.    Kulwinder Booth MD  Diplomate of the American Board of Anesthesiology, Pain Medicine

## 2023-03-24 ENCOUNTER — PATIENT OUTREACH (OUTPATIENT)
Dept: GERIATRIC MEDICINE | Facility: CLINIC | Age: 68
End: 2023-03-24
Payer: COMMERCIAL

## 2023-03-24 DIAGNOSIS — M15.0 PRIMARY GENERALIZED (OSTEO)ARTHRITIS: Primary | ICD-10-CM

## 2023-03-24 NOTE — PROGRESS NOTES
[facility] Care Coordination Contact  Care Coordinator received notification of Emergency Room visit and same day surgery/ injection procedure.      ER visit occurred on 3/17/2023 at [facility]  with Dx of knee pain.     Member then was seen in hospital on 3/23/23 for a pain injection, same day procedure.     Care Coordinator contacted member and reviewed discharge summary.  Member has a follow-up appointment with PCP: Yes: scheduled on 03/28/23 to discuss potential knee surgery  Member has had a change in condition: No: remains dependent in ADLs: Dressing, Bathing, meal prep, bed mobility, transfers and walking.   New referrals placed: No  Home Visit Needed: No, Care Coordinator updated her CADI  with member status and requested CADI  check in with her to see if a 45 day increase would be appropriate since post surgery.   Care plan reviewed and updated.  PCP notified of ED visit via EMR.    Malathi Bacon RN PHN  [facility]  [phone]

## 2023-03-24 NOTE — Clinical Note
Guerline King- member is having knee pain, was in the emergency room for treatment and had an injection to manage this pain. Will be meeting with ortho to discuss surgery next week. Just FORREST.  Malathi Bacon RN [facility] [phone]

## 2023-03-28 ENCOUNTER — PRE VISIT (OUTPATIENT)
Dept: ORTHOPEDICS | Facility: CLINIC | Age: 68
End: 2023-03-28

## 2023-03-28 ENCOUNTER — ANCILLARY PROCEDURE (OUTPATIENT)
Dept: GENERAL RADIOLOGY | Facility: CLINIC | Age: 68
End: 2023-03-28
Attending: ORTHOPAEDIC SURGERY
Payer: COMMERCIAL

## 2023-03-28 ENCOUNTER — DOCUMENTATION ONLY (OUTPATIENT)
Dept: ORTHOPEDICS | Facility: CLINIC | Age: 68
End: 2023-03-28

## 2023-03-28 ENCOUNTER — OFFICE VISIT (OUTPATIENT)
Dept: ORTHOPEDICS | Facility: CLINIC | Age: 68
End: 2023-03-28
Payer: COMMERCIAL

## 2023-03-28 ENCOUNTER — ALLIED HEALTH/NURSE VISIT (OUTPATIENT)
Dept: EDUCATION SERVICES | Facility: CLINIC | Age: 68
End: 2023-03-28
Payer: COMMERCIAL

## 2023-03-28 DIAGNOSIS — M15.0 PRIMARY GENERALIZED (OSTEO)ARTHRITIS: ICD-10-CM

## 2023-03-28 DIAGNOSIS — M17.11 PRIMARY LOCALIZED OSTEOARTHRITIS OF RIGHT KNEE: Primary | ICD-10-CM

## 2023-03-28 DIAGNOSIS — E11.65 TYPE 2 DIABETES MELLITUS WITH HYPERGLYCEMIA, WITH LONG-TERM CURRENT USE OF INSULIN (H): Primary | ICD-10-CM

## 2023-03-28 DIAGNOSIS — Z79.4 TYPE 2 DIABETES MELLITUS WITH HYPERGLYCEMIA, WITH LONG-TERM CURRENT USE OF INSULIN (H): Primary | ICD-10-CM

## 2023-03-28 PROCEDURE — G0108 DIAB MANAGE TRN  PER INDIV: HCPCS

## 2023-03-28 PROCEDURE — 73562 X-RAY EXAM OF KNEE 3: CPT | Mod: RT | Performed by: STUDENT IN AN ORGANIZED HEALTH CARE EDUCATION/TRAINING PROGRAM

## 2023-03-28 PROCEDURE — 99214 OFFICE O/P EST MOD 30 MIN: CPT | Performed by: ORTHOPAEDIC SURGERY

## 2023-03-28 RX ORDER — METFORMIN HCL 500 MG
TABLET, EXTENDED RELEASE 24 HR ORAL
Qty: 360 TABLET | Refills: 1 | Status: SHIPPED | OUTPATIENT
Start: 2023-03-28 | End: 2023-11-06

## 2023-03-28 ASSESSMENT — PAIN SCALES - GENERAL: PAINLEVEL: WORST PAIN (10)

## 2023-03-28 NOTE — NURSING NOTE
"Holly Muir's chief complaint for this visit includes:  Chief Complaint   Patient presents with     Consult     R Knee pain       Referring Provider:  No referring provider defined for this encounter.    There were no vitals taken for this visit.  Worst Pain (10)     Pain increases with: Walking, standing, weight bearing, any activity  Previous surgeries: No  Previous injections within last 6 months: YES - Date: 2/27/23  Treatments done: Injections - did not help, PT -  Did not help knee, Rest, Ice.   Imaging completed: XR today  Pain: 10/10  Concerns: pain is extremely debilitating and does not allow for any activity. Pain is radiating to hip due to bones \"popping\" and hitting each other.      Martinez Patel, ATC    "

## 2023-03-28 NOTE — PROGRESS NOTES
Chief Complaint: Consult (R Knee pain)     HPI: Holly Muir returns today in follow-up for her  Right knee. She reports that she remains severely painful and that this has worsened in the past  4 months. She did not ge the MR we discussed at her last visit but got new radiographs today. Can walk less that 1/4 mile and requires a walker to walk at all. Would like to discuss total knee.      Medications and allergies are documented in the EMR and have been reviewed.      Current Outpatient Medications:      ARIPiprazole (ABILIFY) 15 MG tablet, Take 15 mg by mouth At Bedtime, Disp: , Rfl:      blood glucose (ACCU-CHEK CELIA PLUS) test strip, Use to test blood sugar 4 times daily or as directed., Disp: 100 strip, Rfl: 3     blood glucose (NO BRAND SPECIFIED) test strip, Use to test blood sugar 4 times daily or as directed., Disp: 360 strip, Rfl: 3     blood glucose (NO BRAND SPECIFIED) test strip, Needs VERIO strips. Use to test blood sugar 3 times daily., Disp: 100 strip, Rfl: 11     blood glucose monitoring (ACCU-CHEK CELIA PLUS) meter device kit, Use to test blood sugar 4 times daily or as directed., Disp: , Rfl:      blood glucose monitoring (NO BRAND SPECIFIED) meter device kit, Use to test blood sugar 4 times daily or as directed., Disp: 1 kit, Rfl: 0     blood glucose monitoring (SOFTCLIX) lancets, Use to test blood sugar 4 times daily or as directed., Disp: 100 each, Rfl: 3     buPROPion (WELLBUTRIN XL) 150 MG 24 hr tablet, Take 150 mg by mouth daily, Disp: , Rfl:      busPIRone HCl (BUSPAR) 30 MG tablet, TAKE 1 TABLET BY MOUTH TWICE A DAY, Disp: 60 tablet, Rfl: 5     celecoxib (CELEBREX) 100 MG capsule, Take 1 capsule (100 mg) by mouth 2 times daily, Disp: 60 capsule, Rfl: 2     cetirizine (ZYRTEC) 10 MG tablet, TAKE 1 TABLET BY MOUTH EVERY DAY, Disp: 90 tablet, Rfl: 1     Continuous Blood Gluc  (FREESTYLE CM 2 READER) [place], 1 Dose continuous, Disp: 1 each, Rfl: 0     Continuous Blood Gluc Sensor  (FREESTYLE CM 14 DAY SENSOR) MISC, Use one sensor every 14 days.  Use to read blood sugar per 's instructions, Disp: 6 each, Rfl: 3     Continuous Blood Gluc Sensor (FREESTYLE CM 2 SENSOR) [facility], 1 Dose continuous, Disp: 9 each, Rfl: 3     CVS ACETAMINOPHEN EX  MG tablet, TAKE 2 TABLETS BY MOUTH EVERY 8 HOURS. MAX ACETAMINOPHEN DOSE: 4000MG IN 24 HRS., Disp: 180 tablet, Rfl: 1     CVS ASPIRIN ADULT LOW DOSE 81 MG chewable tablet, TAKE 1 TABLET BY MOUTH EVERY DAY, Disp: 90 tablet, Rfl: 0     ferrous gluconate (FERGON) 324 (38 Fe) MG tablet, TAKE 1 TABLET (324 MG) BY MOUTH DAILY (WITH BREAKFAST), Disp: 90 tablet, Rfl: 3     gabapentin (NEURONTIN) 600 MG tablet, TAKE 2 TABLETS (1,200 MG) BY MOUTH 3 TIMES DAILY FOR 30 DAYS, Disp: 180 tablet, Rfl: 5     glimepiride (AMARYL) 2 MG tablet, Take 2 tablets (4 mg) by mouth 2 times daily (before meals), Disp: 180 tablet, Rfl: 1     HYDROcodone-acetaminophen (NORCO) 5-325 MG tablet, Take 1 tablet by mouth every 6 hours as needed for pain, Disp: 30 tablet, Rfl: 0     ibuprofen (ADVIL/MOTRIN) 200 MG tablet, Take 200 mg by mouth as needed for pain, Disp: , Rfl:      insulin glargine (LANTUS SOLOSTAR) 100 UNIT/ML pen, Inject 45 units at bedtime + 2 units for priming of pen, Disp: 45 mL, Rfl: 1     insulin pen needle (31G X 5 MM) 31G X 5 MM miscellaneous, Use four times 4 day times a day, Disp: 200 each, Rfl: 3     Lancets (ONETOUCH DELICA PLUS [id number]) MISC, 1 each 3 times daily, Disp: 100 each, Rfl: 11     metFORMIN (GLUCOPHAGE XR) 500 MG 24 hr tablet, Take one tablet with breakfast and one tablet with dinner, Disp: 120 tablet, Rfl: 11     Multiple Vitamin (DAILY-ISAAC MULTIVITAMIN) TABS, TAKE 1 TABLET BY MOUTH EVERY DAY, Disp: 90 tablet, Rfl: 0     naloxone (NARCAN) 4 MG/0.1ML nasal spray, Spray 1 spray (4 mg) into one nostril alternating nostrils once as needed for opioid reversal every 2-3 minutes until assistance arrives, Disp: 0.2 mL, Rfl: 0      nystatin (MYCOSTATIN) 673592 UNIT/GM external powder, Apply 1 dose topically 3 times daily as needed, Disp: 60 g, Rfl: 3     omeprazole (PRILOSEC) 20 MG DR capsule, TAKE 2 CAPSULES (40 MG) BY MOUTH DAILY *TAKE 30-60 MINUTES BEFORE A MEAL*, Disp: 180 capsule, Rfl: 0     order for DME, Equipment being ordered: grab bar for bath tub/shower., Disp: 1 Units, Rfl: 0     order for DME, Equipment being ordered: railing for stairs at home., Disp: 1 Units, Rfl: 0     order for DME, Equipment being ordered: Knee walker , Disp: 1 Device, Rfl: 0     order for DME, by Device route continuous Air walker -  Use as instructed, Disp: 1 Device, Rfl: 0     ORDER FOR DME, Equipment being ordered:  Ostomy supplies.  Drain Pouch by [place] #.    Also needs Barrier by [place] #., Disp: 3 Month, Rfl: 4     ORDER FOR DME, Equipment being ordered: Cane, Disp: 1 Units, Rfl: 0     PARoxetine (PAXIL) 20 MG tablet, TAKE 2 TABLETS AT BEDTIME (Prescribed by Dr. Isael Jiménez at [facility]), Disp: 30 tablet, Rfl:      pioglitazone (ACTOS) 45 MG tablet, Take 1 tablet (45 mg) by mouth daily, Disp: 90 tablet, Rfl: 3     propranolol ER (INDERAL LA) 80 MG 24 hr capsule, TAKE 1 CAPSULE BY MOUTH EVERY DAY, Disp: 90 capsule, Rfl: 2     ramipril (ALTACE) 5 MG capsule, TAKE 1 CAPSULE BY MOUTH EVERY DAY, Disp: 90 capsule, Rfl: 2     rOPINIRole (REQUIP) 0.25 MG tablet, TAKE 1 TABLET (0.25 MG) BY MOUTH EVERY EVENING, Disp: 90 tablet, Rfl: 2     simvastatin (ZOCOR) 20 MG tablet, TAKE 1 TABLET BY MOUTH EVERYDAY AT BEDTIME, Disp: 90 tablet, Rfl: 2     traMADol (ULTRAM) 50 MG tablet, TAKE 1 TABLET (50 MG) BY MOUTH 2 TIMES DAILY AS NEEDED FOR SEVERE PAIN (7-10), Disp: 30 tablet, Rfl: 0     traZODone (DESYREL) 50 MG tablet, Take 1.5 tablets (75 mg) by mouth At Bedtime, Disp: , Rfl:      UNIVERSAL REMOVER WIPES EX MISC, uses to remove barrier from colostomy bag at time of change, Disp: 1 Each, Rfl: 3     vitamin C (CVS VITAMIN C) 500 MG  tablet, 1/2 TABLET BY MOUTH INDIRA WITH IRON SUPPLEMENT BEFORE MEAL, Disp: 45 tablet, Rfl: 3     VITAMIN D3 25 MCG (1000 UT) tablet, TAKE 1 TABLET BY MOUTH EVERY DAY, Disp: 90 tablet, Rfl: 3    Current Facility-Administered Medications:      lidocaine 1 % injection 2 mL, 2 mL, , , MurtazaenholChristopher monae, DO, 2 mL at 11/19/22 1030     triamcinolone (KENALOG-40) injection 40 mg, 40 mg, , , Santy Harrington Aldo, DO, 40 mg at 02/27/23 1112     triamcinolone (KENALOG-40) injection 40 mg, 40 mg, , , MurtazaenholChristopher monae, DO, 40 mg at 11/19/22 1030     triamcinolone (KENALOG-40) injection 40 mg, 40 mg, , , Santy Harrington Aldo, DO, 40 mg at 11/10/22 1401     triamcinolone (KENALOG-40) injection 40 mg, 40 mg, , , Santy Harrington Aldo, DO, 40 mg at 11/10/22 1401    Allergies: Morphine sulfate    Current Status:  KOOS Jr: 42.281  UCLA: 2    Physical Exam:  On physical examination the patient appears the stated age, is in no acute distress, affect is appropriate, and breathing is non-labored.    There were no vitals taken for this visit.  There is no height or weight on file to calculate BMI.    Rises from hair: with some effort   Gait: antalgic with less time on the   Appearance: benign  Clinical alignment: mild varus   Effusion: moderate  Tenderness to palpation: medial more than lateral joint line   Extension:7  Flexion: 105  Patellar tracking: normal   Collateral ligaments: intact    Cruciate ligaments: grossly intact     Stable in in the sagittal plane in mid-flexion.    Distally, the circulatory, motor, and sensation exam is intact with 5/5 EHL, gastroc-soleus, and tibialis anterior.  Sensation to light touch is intact.  Dorsalis pedis and posterior tibialis pulses are palpable.  There are no sores on the feet, no bruising, and no lymphedema.    Xray's show further progression in right knee osteoarthritis with no complete loss of the medial joitn space and presence of large femoral and tibial osteophytes. There is a fracture through  one of these osteophytes.    Assessment: advanced right knee osteoarthritis associated with severe symptoms depsite appropriate ;long-term non-operative management. There has been an interval increase in her symptoms which is consistent with both the progression of disease and the fracture through the medial peripheral osteophyte seen on today's radiographs. We discussed the diagnosis and the treatment options including living with it, additional non-operative management, and arthroplasty.  We discussed the risks and benefits of total knee arthroplasty at length and reviewed the proposed surgical plan.  The discussion included but was not limited to the following:  blood clots, blood clots to the lungs, infection, injury to blood vessels and nerves, stiffness, and continued pain. We discussed that as part of the routine part of surgical care a qualified assist may finish closing the wound after I have left the room. We discussed the post-operative recovery for the typical patient, return to driving, and return to normal activities.  All the patient's questions were answered to the best of my ability.    Plan: right total knee when the patient chooses to go forward with it.     No ref. provider found

## 2023-03-28 NOTE — LETTER
3/28/2023         RE: Holly Muir  [id number] [facility] [id number]        Dear Colleague,    Thank you for referring your patient, Holly Muir, to the [facility]. Please see a copy of my visit note below.    Chief Complaint: Consult (R Knee pain)     HPI: Holly Muir returns today in follow-up for her  Right knee. She reports that she remains severely painful and that this has worsened in the past  4 months. She did not ge the MR we discussed at her last visit but got new radiographs today. Can walk less that 1/4 mile and requires a walker to walk at all. Would like to discuss total knee.      Medications and allergies are documented in the EMR and have been reviewed.      Current Outpatient Medications:      ARIPiprazole (ABILIFY) 15 MG tablet, Take 15 mg by mouth At Bedtime, Disp: , Rfl:      blood glucose (ACCU-CHEK CELIA PLUS) test strip, Use to test blood sugar 4 times daily or as directed., Disp: 100 strip, Rfl: 3     blood glucose (NO BRAND SPECIFIED) test strip, Use to test blood sugar 4 times daily or as directed., Disp: 360 strip, Rfl: 3     blood glucose (NO BRAND SPECIFIED) test strip, Needs VERIO strips. Use to test blood sugar 3 times daily., Disp: 100 strip, Rfl: 11     blood glucose monitoring (ACCU-CHEK CELIA PLUS) meter device kit, Use to test blood sugar 4 times daily or as directed., Disp: , Rfl:      blood glucose monitoring (NO BRAND SPECIFIED) meter device kit, Use to test blood sugar 4 times daily or as directed., Disp: 1 kit, Rfl: 0     blood glucose monitoring (SOFTCLIX) lancets, Use to test blood sugar 4 times daily or as directed., Disp: 100 each, Rfl: 3     buPROPion (WELLBUTRIN XL) 150 MG 24 hr tablet, Take 150 mg by mouth daily, Disp: , Rfl:      busPIRone HCl (BUSPAR) 30 MG tablet, TAKE 1 TABLET BY MOUTH TWICE A DAY, Disp: 60 tablet, Rfl: 5     celecoxib (CELEBREX) 100 MG capsule, Take 1 capsule (100 mg) by mouth 2 times  daily, Disp: 60 capsule, Rfl: 2     cetirizine (ZYRTEC) 10 MG tablet, TAKE 1 TABLET BY MOUTH EVERY DAY, Disp: 90 tablet, Rfl: 1     Continuous Blood Gluc  (FREESTYLE CM 2 READER) [place], 1 Dose continuous, Disp: 1 each, Rfl: 0     Continuous Blood Gluc Sensor (FREESTYLE CM 14 DAY SENSOR) MISC, Use one sensor every 14 days.  Use to read blood sugar per 's instructions, Disp: 6 each, Rfl: 3     Continuous Blood Gluc Sensor (FREESTYLE CM 2 SENSOR) MISC, 1 Dose continuous, Disp: 9 each, Rfl: 3     CVS ACETAMINOPHEN EX  MG tablet, TAKE 2 TABLETS BY MOUTH EVERY 8 HOURS. MAX ACETAMINOPHEN DOSE: 4000MG IN 24 HRS., Disp: 180 tablet, Rfl: 1     CVS ASPIRIN ADULT LOW DOSE 81 MG chewable tablet, TAKE 1 TABLET BY MOUTH EVERY DAY, Disp: 90 tablet, Rfl: 0     ferrous gluconate (FERGON) 324 (38 Fe) MG tablet, TAKE 1 TABLET (324 MG) BY MOUTH DAILY (WITH BREAKFAST), Disp: 90 tablet, Rfl: 3     gabapentin (NEURONTIN) 600 MG tablet, TAKE 2 TABLETS (1,200 MG) BY MOUTH 3 TIMES DAILY FOR 30 DAYS, Disp: 180 tablet, Rfl: 5     glimepiride (AMARYL) 2 MG tablet, Take 2 tablets (4 mg) by mouth 2 times daily (before meals), Disp: 180 tablet, Rfl: 1     HYDROcodone-acetaminophen (NORCO) 5-325 MG tablet, Take 1 tablet by mouth every 6 hours as needed for pain, Disp: 30 tablet, Rfl: 0     ibuprofen (ADVIL/MOTRIN) 200 MG tablet, Take 200 mg by mouth as needed for pain, Disp: , Rfl:      insulin glargine (LANTUS SOLOSTAR) 100 UNIT/ML pen, Inject 45 units at bedtime + 2 units for priming of pen, Disp: 45 mL, Rfl: 1     insulin pen needle (31G X 5 MM) 31G X 5 MM miscellaneous, Use four times 4 day times a day, Disp: 200 each, Rfl: 3     Lancets (ONETOUCH DELICA PLUS [id number]) MISC, 1 each 3 times daily, Disp: 100 each, Rfl: 11     metFORMIN (GLUCOPHAGE XR) 500 MG 24 hr tablet, Take one tablet with breakfast and one tablet with dinner, Disp: 120 tablet, Rfl: 11     Multiple Vitamin (DAILY-ISAAC MULTIVITAMIN) TABS, TAKE  1 TABLET BY MOUTH EVERY DAY, Disp: 90 tablet, Rfl: 0     naloxone (NARCAN) 4 MG/0.1ML nasal spray, Spray 1 spray (4 mg) into one nostril alternating nostrils once as needed for opioid reversal every 2-3 minutes until assistance arrives, Disp: 0.2 mL, Rfl: 0     nystatin (MYCOSTATIN) [id number] UNIT/GM external powder, Apply 1 dose topically 3 times daily as needed, Disp: 60 g, Rfl: 3     omeprazole (PRILOSEC) 20 MG DR capsule, TAKE 2 CAPSULES (40 MG) BY MOUTH DAILY *TAKE 30-60 MINUTES BEFORE A MEAL*, Disp: 180 capsule, Rfl: 0     order for DME, Equipment being ordered: grab bar for bath tub/shower., Disp: 1 Units, Rfl: 0     order for DME, Equipment being ordered: railing for stairs at home., Disp: 1 Units, Rfl: 0     order for DME, Equipment being ordered: Knee walker , Disp: 1 Device, Rfl: 0     order for DME, by Device route continuous Air walker -  Use as instructed, Disp: 1 Device, Rfl: 0     ORDER FOR DME, Equipment being ordered:  Ostomy supplies.  Drain Pouch by [place] #.    Also needs Barrier by [place] #., Disp: 3 Month, Rfl: 4     ORDER FOR DME, Equipment being ordered: Cane, Disp: 1 Units, Rfl: 0     PARoxetine (PAXIL) 20 MG tablet, TAKE 2 TABLETS AT BEDTIME (Prescribed by Dr. [facility] at [facility]), Disp: 30 tablet, Rfl:      pioglitazone (ACTOS) 45 MG tablet, Take 1 tablet (45 mg) by mouth daily, Disp: 90 tablet, Rfl: 3     propranolol ER (INDERAL LA) 80 MG 24 hr capsule, TAKE 1 CAPSULE BY MOUTH EVERY DAY, Disp: 90 capsule, Rfl: 2     ramipril (ALTACE) 5 MG capsule, TAKE 1 CAPSULE BY MOUTH EVERY DAY, Disp: 90 capsule, Rfl: 2     rOPINIRole (REQUIP) 0.25 MG tablet, TAKE 1 TABLET (0.25 MG) BY MOUTH EVERY EVENING, Disp: 90 tablet, Rfl: 2     simvastatin (ZOCOR) 20 MG tablet, TAKE 1 TABLET BY MOUTH EVERYDAY AT BEDTIME, Disp: 90 tablet, Rfl: 2     traMADol (ULTRAM) 50 MG tablet, TAKE 1 TABLET (50 MG) BY MOUTH 2 TIMES DAILY AS NEEDED FOR SEVERE PAIN (7-10), Disp: 30 tablet,  Rfl: 0     traZODone (DESYREL) 50 MG tablet, Take 1.5 tablets (75 mg) by mouth At Bedtime, Disp: , Rfl:      UNIVERSAL REMOVER WIPES EX MISC, uses to remove barrier from colostomy bag at time of change, Disp: 1 Each, Rfl: 3     vitamin C (CVS VITAMIN C) 500 MG tablet, 1/2 TABLET BY MOUTH JACOBSON WITH IRON SUPPLEMENT BEFORE MEAL, Disp: 45 tablet, Rfl: 3     VITAMIN D3 25 MCG (1000 UT) tablet, TAKE 1 TABLET BY MOUTH EVERY DAY, Disp: 90 tablet, Rfl: 3    Current Facility-Administered Medications:      lidocaine 1 % injection 2 mL, 2 mL, , , MurtazaenholChristopher monae, DO, 2 mL at 11/19/22 1030     triamcinolone (KENALOG-40) injection 40 mg, 40 mg, , , Santy Harrington, DO, 40 mg at 02/27/23 1112     triamcinolone (KENALOG-40) injection 40 mg, 40 mg, , , MurtazaenhChristopher jasso, DO, 40 mg at 11/19/22 1030     triamcinolone (KENALOG-40) injection 40 mg, 40 mg, , , Santy Harrington, DO, 40 mg at 11/10/22 1401     triamcinolone (KENALOG-40) injection 40 mg, 40 mg, , , Santy Harrington, DO, 40 mg at 11/10/22 1401    Allergies: Morphine sulfate    Current Status:  JOSE LUIS Jr: 42.281  UCLA: 2    Physical Exam:  On physical examination the patient appears the stated age, is in no acute distress, affect is appropriate, and breathing is non-labored.    There were no vitals taken for this visit.  There is no height or weight on file to calculate BMI.    Rises from hair: with some effort   Gait: antalgic with less time on the   Appearance: benign  Clinical alignment: mild varus   Effusion: moderate  Tenderness to palpation: medial more than lateral joint line   Extension:7  Flexion: 105  Patellar tracking: normal   Collateral ligaments: intact    Cruciate ligaments: grossly intact     Stable in in the sagittal plane in mid-flexion.    Distally, the circulatory, motor, and sensation exam is intact with 5/5 EHL, gastroc-soleus, and tibialis anterior.  Sensation to light touch is intact.  Dorsalis pedis and posterior tibialis pulses are palpable.   There are no sores on the feet, no bruising, and no lymphedema.    Xray's show further progression in right knee osteoarthritis with no complete loss of the medial joitn space and presence of large femoral and tibial osteophytes. There is a fracture through one of these osteophytes.    Assessment: advanced right knee osteoarthritis associated with severe symptoms depsite appropriate ;long-term non-operative management. There has been an interval increase in her symptoms which is consistent with both the progression of disease and the fracture through the medial peripheral osteophyte seen on today's radiographs. We discussed the diagnosis and the treatment options including living with it, additional non-operative management, and arthroplasty.  We discussed the risks and benefits of total knee arthroplasty at length and reviewed the proposed surgical plan.  The discussion included but was not limited to the following:  blood clots, blood clots to the lungs, infection, injury to blood vessels and nerves, stiffness, and continued pain. We discussed that as part of the routine part of surgical care a qualified assist may finish closing the wound after I have left the room. We discussed the post-operative recovery for the typical patient, return to driving, and return to normal activities.  All the patient's questions were answered to the best of my ability.    Plan: right total knee when the patient chooses to go forward with it.     No ref. provider found              Again, thank you for allowing me to participate in the care of your patient.        Sincerely,        Sanford Sheehan MD

## 2023-03-28 NOTE — PATIENT INSTRUCTIONS
PLAN:  *Increase Lantus to 44 units daily  *Increase Metformin to two tablets with breakfast and one tablet with dinner for one week. If tolerating increase to two tablets with breakfast and two tablets with dinner  *Continue Glimepiride to 4 mg (2 tablets) before breakfast and dinner  *Try to consistently take all medication  *Let me know if you start to have blood sugars less than 70    FOLLOW-UP:    *4/18 with Argelia by Telephone at 11:00 AM    Argelia Maciel RN, Diabetes Educator  Diabetes Education Department  [facility] and [place]  [phone]

## 2023-03-28 NOTE — PROGRESS NOTES
Diabetes Self-Management Education & Support Telephone Visit  Holly Muir contacted today for education related to Type 2 diabetes    Patient is being treated with: oral agents and insulin    Diabetes Self-Management Education & Support  Holly Muir presents today for education related to Type 2 diabetes    Patient is being treated with:  oral agents and insulin  She is accompanied by self    Year of diagnosis: 2000  Referring provider:  Dr. Medrano  Living Situation: Lives with daughter in home  Employment: No    PATIENT CONCERNS RELATED TO DIABETES SELF MANAGEMENT:   Lowering A1C for right knee replacement    ASSESSMENT:    Taking Medication:   Current Diabetes Management per Patient:  Taking diabetes medications? Yes. Actos 45 mg, Metformin 500 mg BID, Lantus 40 units at 9pm, Glimepiride 4 mg before breakfast and dinner-missed 3 doses this past week     Monitoring  Patient glucose self monitoring as follows: continuously using a continuous glucose monitor (CGM)  BG meter: Freestyle Mathieu 2-SpaBoom Brandi, connected.  meter  BG results:       Patient's most recent   Lab Results   Component Value Date    A1C 9.1 01/19/2023    A1C 8.9 11/12/2021      Patient's A1C goal: <7.0    Activity: Future Physical therapy for back and knee pain. No regular program    Healthy Eating:   Patient currently eats 3 meals 2-3 snacks per day   Breakfast 7-8 am: Oatmeal with nuts, strawberry, apple. Water  Lunch: [place], Mexican dish, water  Dinner 6-7 PM: Chicken, green beans, carrots, salad, rice-not often, water, diet soda  Snacks:Pisatachios, Skinny popcorn, Sweet potato chips, nuts, veggie straws  Water throughout day  Alcohol: Rare    Sleeping Pattern: Bed at 9 pm, up between 5-6 am    Problem Solving:    Patient is at risk of hypoglycemia?: Frequency is one to two times per week, Feels symptoms when glucose is less than 60 and Usual treatment is Orange juice or orange  Hospitalizations for hyper or hypoglycemia:  No    Healthy Coping and Stress Management:   Sources of stress identified by patient  Other (please specify):  None  Coping mechanisms identified by patient:  Other (please specify):  Shopping with friends, movies, out to eat      EDUCATION and INSTRUCTION PROVIDED AT THIS VISIT:    Poorly controlled T2 returns to evaluate BG after increasing Lantus and Glimepiride dose. Patient is having significant right knee pain and will need replacement. Per patient Ortho would like A1C <8.0 before proceeding with surgery. Unfortunately, patient actually decreased dose from 42 units to 40 units. Cata (dtr) joined visit via telephone to confirm doses. Current regimen: Actos 45 mg, Metformin 500 mg BID, Lantus 40 units at 9pm, Glimepiride 4 mg before breakfast and dinner-missed 3 doses of Metformin and/or Glimepiride doses this past week. Has not missed Lantus dose since setting alarm on phone. Cata reports they did give 5 units of Novolog a few days ago for birthday cake and BG in 300's. Mathieu data slightly improved: TIR now 38%, TAR >250=34%, with no lows. Pattern of highs throughout day. She has been rotating injection sites since last visit and discovered Lantus insulin had ; is now using new pen. Reviewed with patient and daughter concern for persistent high blood sugars. Discussed possibly adding mealtime insulin. Daughter reports history of insulin abuse which led to multiple ER visits for hypoglycemia. She would like to trial increasing Metformin and consistently taking medications first. Could consider mixed insulin if no improvement. Will increase Lantus to 44 units daily. Increase Metformin to two tablets with breakfast and one tablet with dinner, if tolerating in one week increase to two tablets with breakfast and dinner.     Patient-stated goal written and given to Holly Muir.  Verbalized and demonstrated understanding of instructions.     PLAN:  *Increase Lantus to 44 units daily  *Increase  Metformin to two tablets with breakfast and one tablet with dinner for one week. If tolerating increase to two tablets with breakfast and two tablets with dinner  *Continue Glimepiride to 4 mg (2 tablets) before breakfast and dinner  *Try to consistently take all medication  *Let me know if you start to have blood sugars less than 70    FOLLOW-UP:    *4/18 with Argelia by Telephone at 11:00 AM    Time spent with patient at today's visit was 40 minutes.      Any diabetes medication dose changes were made via the [facility] Protocol and [facility]ative [facility]  A copy of this encounter was provided to patient's referring provider-Marcela

## 2023-03-31 ENCOUNTER — OFFICE VISIT (OUTPATIENT)
Dept: URGENT CARE | Facility: URGENT CARE | Age: 68
End: 2023-03-31
Payer: COMMERCIAL

## 2023-03-31 VITALS
TEMPERATURE: 98.5 F | BODY MASS INDEX: 36.58 KG/M2 | WEIGHT: 200 LBS | OXYGEN SATURATION: 96 % | DIASTOLIC BLOOD PRESSURE: 77 MMHG | SYSTOLIC BLOOD PRESSURE: 125 MMHG | RESPIRATION RATE: 24 BRPM | HEART RATE: 66 BPM

## 2023-03-31 DIAGNOSIS — J06.9 UPPER RESPIRATORY TRACT INFECTION, UNSPECIFIED TYPE: Primary | ICD-10-CM

## 2023-03-31 PROCEDURE — 99213 OFFICE O/P EST LOW 20 MIN: CPT | Performed by: PHYSICIAN ASSISTANT

## 2023-03-31 RX ORDER — BENZONATATE 200 MG/1
200 CAPSULE ORAL 3 TIMES DAILY PRN
Qty: 21 CAPSULE | Refills: 0 | Status: SHIPPED | OUTPATIENT
Start: 2023-03-31 | End: 2023-04-07

## 2023-03-31 ASSESSMENT — PAIN SCALES - GENERAL: PAINLEVEL: NO PAIN (0)

## 2023-03-31 NOTE — PROGRESS NOTES
Assessment & Plan     1. Upper respiratory tract infection, unspecified type    - benzonatate (TESSALON) 200 MG capsule; Take 1 capsule (200 mg) by mouth 3 times daily as needed for cough  Dispense: 21 capsule; Refill: 0    Follow up if not improving over the next week.                   SAFIA Mcdermott [facility]     Holly is a 68 year old female who presents to clinic today for the following health issues:  Chief Complaint   Patient presents with     Cough     Cough, 2 days, chest pressure/pain     HPI    4 days of cough. No wheezing. No fever. Some nasal congestion. Fatigued. No shortness of breath. Some sore throat. No ear pain.           Review of Systems        Objective    /77   Pulse 66   Temp 98.5  F (36.9  C) (Tympanic)   Resp 24   Wt 90.7 kg (200 lb)   SpO2 96%   BMI 36.58 kg/m    Physical Exam  Vitals and nursing note reviewed.   Constitutional:       General: She is not in acute distress.     Appearance: She is well-developed. She is not diaphoretic.   HENT:      Head: Normocephalic and atraumatic.      Right Ear: Tympanic membrane and external ear normal.      Left Ear: Tympanic membrane and external ear normal.   Eyes:      Pupils: Pupils are equal, round, and reactive to light.   Cardiovascular:      Rate and Rhythm: Normal rate and regular rhythm.   Pulmonary:      Effort: Pulmonary effort is normal. No respiratory distress.      Breath sounds: Normal breath sounds.   Musculoskeletal:      Cervical back: Normal range of motion and neck supple.   Lymphadenopathy:      Cervical: No cervical adenopathy.   Skin:     General: Skin is warm and dry.   Neurological:      Mental Status: She is alert.      Cranial Nerves: No cranial nerve deficit.

## 2023-04-02 ENCOUNTER — MYC REFILL (OUTPATIENT)
Dept: ORTHOPEDICS | Facility: CLINIC | Age: 68
End: 2023-04-02
Payer: COMMERCIAL

## 2023-04-02 DIAGNOSIS — M17.11 PRIMARY OSTEOARTHRITIS OF RIGHT KNEE: ICD-10-CM

## 2023-04-03 RX ORDER — HYDROCODONE BITARTRATE AND ACETAMINOPHEN 5; 325 MG/1; MG/1
1 TABLET ORAL EVERY 6 HOURS PRN
Qty: 30 TABLET | Refills: 0 | Status: SHIPPED | OUTPATIENT
Start: 2023-04-03 | End: 2023-04-18

## 2023-04-03 NOTE — TELEPHONE ENCOUNTER
Prescription refill requested for:   HYDROcodone-acetaminophen (NORCO) 5-325 MG tablet     Last Written Prescription Date:  3/22/23  Last Fill Quantity: 30,   # refills: 0  Last Office Visit: 3/9/23  Future Office visit:       Lu Kerr, EMT

## 2023-04-11 ENCOUNTER — VIRTUAL VISIT (OUTPATIENT)
Dept: NUTRITION | Facility: CLINIC | Age: 68
End: 2023-04-11
Payer: COMMERCIAL

## 2023-04-11 DIAGNOSIS — Z79.4 TYPE 2 DIABETES MELLITUS WITHOUT COMPLICATION, WITH LONG-TERM CURRENT USE OF INSULIN (H): Primary | ICD-10-CM

## 2023-04-11 DIAGNOSIS — E11.9 TYPE 2 DIABETES MELLITUS WITHOUT COMPLICATION, WITH LONG-TERM CURRENT USE OF INSULIN (H): Primary | ICD-10-CM

## 2023-04-11 DIAGNOSIS — E66.01 MORBID OBESITY (H): ICD-10-CM

## 2023-04-11 PROCEDURE — 97803 MED NUTRITION INDIV SUBSEQ: CPT | Mod: 93 | Performed by: DIETITIAN, REGISTERED

## 2023-04-11 NOTE — PROGRESS NOTES
Medical Nutrition Therapy  Visit Type: Reassessment and intervention    Visit Details    How would you like to obtain your AVS? MyChart  If the correspondence for visit is dropped, how would you like your dietitian to reconnect with you:   call back by phone? Yes    Text to cell phone: [phone]  Will anyone else be joining your video visit or telephone call? No  {If patient encounters technical issues they should call [phone] :31    Type of service:  Telephone     Start Time: 10:00am    End Time: 10:34am    Originating Location (pt. Location): Home    Distant Location (provider location):  [facility] WORKING VIRTUAL FROM HOME       Referring Provider: Elizabeth Medrano  Internal (P) endocrinology    REASON FOR REFERRAL:   Holly Muir presents today for MNT and education related to type 2 diabetes, weight management.  She is accompanied by self      Changes since previous consult no       Behavior Status: No improvement Shown Barriers Include:Motivation/Ready to change , Lack of nutrition knowlege and poor memory    Goals: What would you like to work on that will help you most over the next several weeks? Focus on tracking what eating specifically write down carb intake and track a few bloods sugars around food to provide more insight.         She has been making dietary changes and her blood sugars have improved but sometimes still run high depending on how many carbs she eats. She is checking her blood sugars two hours after eating they come on her phone.  She has been having trouble sleeping and waking up in the middle of the night. She has a lot of pain and her blood sugars are often elevated.     She wasn't able to recall everything she ate yesterday due to poor memory.     Checked blood sugars before eating breakfast but forgot and didn't know how to go back and check at the time of our consult but daughter was able to help her blood sugars where running high above 200 before  eating.   Breakfast 8-9am - oatmeal with regular cows milk, walnuts, flax seeds, hawa seeds, sunflower seeds, pumpkin seeds with dried apples or strawberries  Total sugar 7g and carbs 29g fiber 6g with 3 boiled eggs daily     She checks her blood sugars 2 hours later and     Encouraged her to write down what she is eating and track how many carbs, sugar and fiber to start     She forgot what she had for lunch     She did have a sub sandwich for dinner around 6-7pm and stated that her blood sugars went up but unable to identify how high and if it dropped 2 hours later    Once in a while she is doing snacks. If her blood sugars are good she will do a cookie during the day. Discussed the goals we went over last time and getting back on track with the snacks discussed to include some protein and healthy fats and limit the cookies.      INITIAL NUTRITION ASSESSMENT 2/3/23:   Patient comments/concerns relating to nutrition:   One to two times per week has been experiencing low blood sugars less than 60 usually has orange juice to help increase. takes blood sugars when gets up today 105 which has decreased as it was 145-150 a few weeks prior     She struggles with one week BS good and another week high -her average glucose over 14 days from Jan 19th to feb 1 was 226 hard to get to the target range sometimes of . she thinks she may be snacking too much or the wrong thing. She tends to get higher blood sugars per patient around snacks that is when they are the highest       Food/Nutrition History:  Previous diet education: Met with the diabetes educator a couple days ago and went over plate and how much to eat for portions. She has started to cut back on carbs.     Taking Medication:      Current Diabetes Management per Patient:  Taking diabetes medications? Yes     Monitoring  Patient is being treated with:  oral agents and insulin  Patient glucose self monitoring as follows: continuously using a continuous glucose  monitor (CGM)  BG meter: Freestyle Mathieu 2-Freestyle Brandi, connected.  meter  BG results: AGP report reviewed                                                                               Eating Patterns & Meal Planning:    Waking up at: 5-7am - Going to bed at: 9pm    Consuming Breakfast: Yes 7 times/week blood sugars: 105 and blood sugars 2 hour after eating 229  Chicken sandwich from aubrie monterroso with bun with few fries   Oatmeal with flax and almonds -she adds apples few or strawberries has almost daily with water or coffee sugar free   Sometimes will have boiled eggs in am depending on where sugars at       Consuming Lunch:  Yes 7 times/week  Depending on blood sugars checks before usually blood sugars around 130-140 tries to recheck every two hours but sometimes a little earlier   Henriette 2 slices of bread with tuna (celery, onion and tomato) or turkey and sometimes just has turkey, cheese     Consuming Dinner:  Yes 7 times/week  Usually chicken small portions with vegetable such as carrots, broccoli and cauliflower usually with water sometimes soda diet 12oz but will raise blood sugars only once or twice per month   Has cut back on rice only does twice per month 1/4 cup     Snacks: Yes 7 times/week  Pistachios, popcorn, sweet potato chips - her daughter reads the labels and makes sure they are few. Her daughter puts them in zip lock portion sizes to help control blood sugars   Sometimes has smaller whole banana,3/4 cup blueberries or grapes (not sure how many hasn't been counting) for snack       Skips meals/snacks: No      Beverages: Yes  Pop (Diet) 8 oz 2x/month, Tea  1-2x/month adds splenda, Coffee 1 cup/day or once while 2cups per day and Water 8 8oz cups or more/day    Barriers around meals/snacks include:Blood sugars go higher sometimes and can't have what she wants. She will want to have seconds but family helps watches that she doesn't have too much      Diet is high in: processed carbs especially at  snacks   Diet is low in: fat (unsaturated) omega 3 fatty acids, fiber, fruits, vegetables and protein     Grocery Shopping: Yes once twice per month with daughter   Reading Food Labels:  No but daughters do  Barriers Include:back pain hard to cook and walk through grocery store so famiy help    Cooking at home: No daughters and son in law cooks for her - she goes back and forth from their homes     Eats out: Yes   1 meals/per week nadia or aubrie monterroso   Barriers around eating/cooking Include:hard to do salads all the time would like more variety but those foods are high in carbs. Having a hamburger is a treat      Lifestyle Patterns:  Feels stressed with life right now: No      no regular exercise program  Barriers Include:needs physical thearpy for back and knee pain    Struggling with sleep: Yes  Average hours of daily sleep: stays up all throughout the night till next day trouble sleeping only once or twice per month or week. It depends and sometimes blood sugars are high or low at this time. Needs to naps during the day.    When she does sleep it might be during the day and other days she wakes up at 5 or 6am and goes to bed at 10am almost daily     Social History:   Lives with: daughters    Family History   Problem Relation Age of Onset     Diabetes Mother      Cerebrovascular Disease Mother      Diabetes Father      Hypertension Father      Cancer Maternal Grandfather      Cancer Paternal Grandfather      Diabetes Brother      Diabetes Sister      Thyroid Disease Daughter      Thyroid Disease Sister      Multiple Sclerosis Daughter      Glaucoma No family hx of      Macular Degeneration No family hx of           Pertinent Past Medical History:   I have reviewed this patient's medical history and updated it with pertinent information if needed.   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  Past Surgical History:   Procedure Laterality Date     ARTHROSCOPY KNEE RT/LT      LT     BLEPHAROPLASTY BILATERAL Bilateral 8/16/2019    Procedure: BILATERAL UPPER LID BLEPHAROPLASTY;  Surgeon: Randolph Cook MD;  Location: [facility] OR     BREAST LUMPECTOMY, RT/LT      LT-benign      CATARACT IOL, RT/LT       COLONOSCOPY  5/10/2012    Procedure:COLONOSCOPY; screening colonoscopy; Surgeon:MILLA VEGA; Location:[facility] OR     COLONOSCOPY WITH CO2 INSUFFLATION N/A 8/2/2019    Procedure: Colonoscopy with CO2 insufflation;  Surgeon: Himanshu Hi MD;  Location: [facility] OR     COLOSTOMY  2000     COLOSTOMY       HYSTERECTOMY, PAP NO LONGER INDICATED      done in [place]-cervical cancer     IMPLANT STIMULATOR SACRAL NERVE STAGE ONE Right 3/10/2015    Procedure: IMPLANT STIMULATOR SACRAL NERVE STAGE ONE;  Surgeon: Teodora Yusuf MD;  [facility]     IMPLANT STIMULATOR SACRAL NERVE STAGE TWO Right 3/31/2015    Procedure: IMPLANT STIMULATOR SACRAL NERVE STAGE TWO;  Surgeon: Teodora Yusuf MD;  [facility]     IMPLANT STIMULATOR SACRAL NERVE STAGE TWO Right 6/22/2020    Procedure: INSERTION, SACRAL NERVE STIMULATOR, STAGE 2 (replacement of interstim battery);  Surgeon: Teodora Yusuf MD;  Location: [facility]     INJECT EPIDURAL TRANSFORAMINAL Right 1/20/2023    Procedure: Right Lumbar 5-Sacral 1 Transforaminal Epidural Steroid Injection with fluoroscopic guidance and contrast;  Surgeon: Kulwinder Booth MD;  Location: [facility]     INJECT EPIDURAL TRANSFORAMINAL Right 3/23/2023     Procedure: Right Lumbar 5-Sacral 1 Transforaminal Epidural Steroid Injection with fluoroscopic guidance and contrast.;  Surgeon: Kulwinder Booth MD;  Location: [facility]     PHACOEMULSIFICATION WITH STANDARD INTRAOCULAR LENS IMPLANT Left 9/26/2019    Procedure: LEFT PHACOEMULSIFICATION, CATARACT, WITH STANDARD IOL INSERTION;  Surgeon: Francesco Winn MD;  Location: [facility]     PHACOEMULSIFICATION WITH STANDARD INTRAOCULAR LENS IMPLANT Right 10/24/2019    Procedure: RIGHT PHACOEMULSIFICATION, CATARACT, WITH STANDARD IOL INSERTION;  Surgeon: Francesco Winn MD;  Location: [facility]     REPAIR PTOSIS Bilateral 08/16/2019     SALPINGO OOPHORECTOMY,R/L/JENNIFER      Salpingo Oophorectomy, RT/LT/JENNIFER     ZZC EXCIS PTERYGIUM  10/08    [facility]     [facility]OM[facility]       [facility]  [facility]    normal per report-no records available-records destroyed       Medications and Supplements:  Current Outpatient Medications   Medication     ARIPiprazole (ABILIFY) 15 MG tablet     blood glucose (ACCU-CHEK CELIA PLUS) test strip     blood glucose (NO BRAND SPECIFIED) test strip     blood glucose (NO BRAND SPECIFIED) test strip     blood glucose monitoring (ACCU-CHEK CELIA PLUS) meter device kit     blood glucose monitoring (NO BRAND SPECIFIED) meter device kit     blood glucose monitoring (SOFTCLIX) lancets     buPROPion (WELLBUTRIN XL) 150 MG 24 hr tablet     busPIRone HCl (BUSPAR) 30 MG tablet     celecoxib (CELEBREX) 100 MG capsule     cetirizine (ZYRTEC) 10 MG tablet     Continuous Blood Gluc  (FREESTYLE CM 2 READER) GALINA     Continuous Blood Gluc Sensor (FREESTYLE CM 14 DAY SENSOR) MISC     Continuous Blood Gluc Sensor (FREESTYLE CM 2 SENSOR) MISC     CVS ACETAMINOPHEN EX  MG tablet     CVS ASPIRIN ADULT LOW DOSE 81 MG chewable tablet     ferrous gluconate (FERGON) 324 (38 Fe) MG tablet     gabapentin (NEURONTIN) 600 MG tablet     glimepiride (AMARYL) 2  MG tablet     HYDROcodone-acetaminophen (NORCO) 5-325 MG tablet     ibuprofen (ADVIL/MOTRIN) 200 MG tablet     insulin glargine (LANTUS SOLOSTAR) 100 UNIT/ML pen     insulin pen needle (31G X 5 MM) 31G X 5 MM miscellaneous     Lancets (ONETOUCH DELICA PLUS [id number]) MISC     metFORMIN (GLUCOPHAGE XR) 500 MG 24 hr tablet     Multiple Vitamin (DAILY-ISAAC MULTIVITAMIN) TABS     naloxone (NARCAN) 4 MG/0.1ML nasal spray     nystatin (MYCOSTATIN) 279056 UNIT/GM external powder     omeprazole (PRILOSEC) 20 MG DR capsule     order for DME     order for DME     order for DME     order for DME     ORDER FOR DME     ORDER FOR DME     PARoxetine (PAXIL) 20 MG tablet     pioglitazone (ACTOS) 45 MG tablet     propranolol ER (INDERAL LA) 80 MG 24 hr capsule     ramipril (ALTACE) 5 MG capsule     rOPINIRole (REQUIP) 0.25 MG tablet     simvastatin (ZOCOR) 20 MG tablet     traMADol (ULTRAM) 50 MG tablet     traZODone (DESYREL) 50 MG tablet     UNIVERSAL REMOVER WIPES EX MISC     vitamin C (CVS VITAMIN C) 500 MG tablet     VITAMIN D3 25 MCG (1000 UT) tablet     Current Facility-Administered Medications   Medication     lidocaine 1 % injection 2 mL     triamcinolone (KENALOG-40) injection 40 mg     triamcinolone (KENALOG-40) injection 40 mg     triamcinolone (KENALOG-40) injection 40 mg     triamcinolone (KENALOG-40) injection 40 mg       LABS:  Last Basic Metabolic Panel:  Lab Results   Component Value Date     01/19/2023     08/24/2020      Lab Results   Component Value Date    POTASSIUM 4.5 01/19/2023    POTASSIUM 3.8 08/24/2020     Lab Results   Component Value Date    CHLORIDE 99 01/19/2023    CHLORIDE 103 08/24/2020     Lab Results   Component Value Date    NICOLÁS 9.3 01/19/2023    NICOLÁS 8.9 08/24/2020     Lab Results   Component Value Date    CO2 30 01/19/2023    CO2 24 08/24/2020     Lab Results   Component Value Date    BUN 15 01/19/2023    BUN 7 08/24/2020     Lab Results   Component Value Date    CR 0.78 01/19/2023     CR 0.68 08/24/2020     Lab Results   Component Value Date    GLC 78 01/19/2023     08/24/2020       Last Glucose Profile:   Hemoglobin A1C   Date Value Ref Range Status   01/19/2023 9.1 (H) 0.0 - 5.6 % Final   11/12/2021 8.9 (A) 0.0 - 5.7 % Final       Last Lipid Profile:   Cholesterol   Date Value Ref Range Status   01/19/2023 84 <200 mg/dL Final   10/08/2021 134 <200 mg/dL Final   06/18/2020 121 <200 mg/dL Final   12/14/2018 118 <200 mg/dL Final     HDL Cholesterol   Date Value Ref Range Status   06/18/2020 78 >49 mg/dL Final   12/14/2018 68 >49 mg/dL Final     Direct Measure HDL   Date Value Ref Range Status   01/19/2023 51 >=50 mg/dL Final   10/08/2021 76 >=50 mg/dL Final     LDL Cholesterol Calculated   Date Value Ref Range Status   01/19/2023 16 <=100 mg/dL Final   10/08/2021 39 <=100 mg/dL Final   06/18/2020 26 <100 mg/dL Final     Comment:     Desirable:       <100 mg/dl   12/14/2018 25 <100 mg/dL Final     Comment:     Desirable:       <100 mg/dl     Triglycerides   Date Value Ref Range Status   01/19/2023 83 <150 mg/dL Final   10/08/2021 96 <150 mg/dL Final   06/18/2020 87 <150 mg/dL Final   12/14/2018 124 <150 mg/dL Final     Comment:     Fasting specimen     Cholesterol/HDL Ratio   Date Value Ref Range Status   09/18/2015 2.0 0.0 - 5.0 Final   07/29/2014 2.7 0.0 - 5.0 Final       Most recent CBC:  Recent Labs   Lab Test 01/19/23  1115 04/22/22  1339   WBC 7.7 7.4   HGB 10.0* 11.3*   HCT 31.7* 34.9*    334     Most recent hepatic panel:  Recent Labs   Lab Test 01/19/23  1115 10/08/21  0922   ALT 20 35   AST 10 22     Most recent creatinine:  Recent Labs   Lab Test 01/19/23  1115 04/22/22  1339   CR 0.78 0.80       Last Thyroid Profile:   TSH   Date Value Ref Range Status   08/24/2020 1.44 0.40 - 4.00 mU/L Final       Last Mineral Profile:   Ferritin   Date Value Ref Range Status   10/14/2020 40 8 - 252 ng/mL Final     Iron   Date Value Ref Range Status   10/14/2020 43 35 - 180 ug/dL Final  "    Iron Binding Cap   Date Value Ref Range Status   10/14/2020 434 (H) 240 - 430 ug/dL Final         Last Vitamin Profile:   25 OH Vit D2   Date Value Ref Range Status   02/03/2015 <5 ug/L Final     25 OH Vit D3   Date Value Ref Range Status   02/03/2015 49 ug/L Final     25 OH Vit D total   Date Value Ref Range Status   02/03/2015  30 - 75 ug/L Final    <54  Season, race, dietary intake, and treatment affect the concentration of   25-hydroxy-Vitamin D. Values may decrease during winter months and increase   during summer months. Values less than 30 ug/L may indicate Vitamin D   deficiency.   This test was developed and its performance characteristics determined by the   [facility],  Special Chemistry Laboratory. It has   not been cleared or approved by the FDA. The laboratory is regulated under CLIA   as qualified to perform high-complexity testing. This test is used for clinical   purposes. It should not be regarded as investigational or for research.         ANTHROPOMETRICS:  Vitals:   BP Readings from Last 1 Encounters:   03/31/23 125/77     Pulse Readings from Last 1 Encounters:   03/31/23 66     Estimated body mass index is 36.58 kg/m  as calculated from the following:    Height as of 1/2/23: 1.575 m (5' 2\").    Weight as of 3/31/23: 90.7 kg (200 lb).    Wt Readings from Last 5 Encounters:   03/31/23 90.7 kg (200 lb)   01/20/23 90.7 kg (200 lb)   01/02/23 90.7 kg (200 lb)   11/19/22 90.7 kg (200 lb)   09/14/22 93 kg (205 lb)       NUTRITION DIAGNOSIS:   1. Altered nutrition-related laboratory values related to endocrine dysfunction as evidenced by elevated BMI 36, HbA1c 9.1%  and average glucose per AGP report Jan 14- Feb 1st 226. Low HGB 10      NUTRITION INTERVENTION:   Nutrition Education (Application):  Cardiometabolic Food Plan Materials by [employer]     Discussed healthy eating patters, portion sizes for healthy fats and proteins and carbs    " Reviewed and planned out some recipes for breakfast and snacks that included protein and healthy fats     Long Term Goals:   Goal: Decrease fasting blood sugars to 70-99 mg/dL within 1-6 months  Goal: Decrease Hemoglobin A1c <7 % within 2-6 months   Goal: Lose 20-24lbs in 6 months, recommend average 1-2lbs per week of weight loss.   Goal: Increase protein intake to help normalize HGB levels to 11.7-15.7 g/dL         Short Term Goals:  Goal 1: Focus on having a healthy balanced breakfast daily for the next six weeks. - focus on lean proteins 2 serving 12g protein  Incorporate a whole grain at breakfast ex continue oatmeal naturally gluten free with more healthy fats such as handful of walnuts and 1 fruit serving 15g of carbs such as blueberries. Focus on adding in a lean protein as side such as 2 eggs, 2 turkey or chicken sausage.    Try a smoothie 1-2x a week for breakfast for the next six weeks see recipe discussed. add in unsweetened flax, hemp or coconut milk, 1 tbsp of healthy fat such as flax seed ground or hawa seed ground. serving of fruit 1 cup of berries. Veggie (optional), 1 scoop of plant based protein powder or collagen powder, 1 serving of fruit and veggie such as kale, spinach, or veggie powder even spirulina     Avocado on slice of gluten free bread recommend base culture brand      [email or web address]/?[email or web address]     Birch mobley brand for pancakes - paleo  https://Flocktory/collections/pancake-waffle-mix/products/paleo     Try siete soft taco shells with eggs and veggies in am for breakfast   https://PrimeRevenue/collections/tortillas/products/yepeco-vlwbn-slcckqnzl-6-pack    Incorporate protein powder daily with smoothie:    Plant based hemp (recommended brands: [place] Bryant, Nutiva, Just Hemp Protein, [employer] Mill)      Plant based pea (recommended brands: Naked Pea, Now Sports). If you want  to try a combo of pea and hemp the brand Dawson in vanilla or chocolate is a great option.     or collagen peptides -collagen can be used for smoothies, oatmeal and soups. This can help provide essential amino acids and minerals that heal your gut as well as balance blood sugars. A great option if you have a hard time tolerating solids.     Can also consider pre made shakes if unable to make smoothies.      Brand examples that are gluten and dairy free to try:   1) Orgain Vegan Protein Shakes, 20g of Plant Based Protein, Creamy Chocolate - Gluten Free, No Dairy, Soy, or Preservatives, No Added Sugar  2) Pirq, Vegan Protein Shake, Turmeric Curcumin, Perri, Plant-Based Protein Drink, Gluten-Free, Dairy-Free, Soy-Free, Non-GMO, Vegetarian, Kosher, Keto, Low Carb, Low Calorie  3) OWYN Pro Elite Vegan Plant-Based High Protein Shake, 35g Protein, 9 Amino Acids, Omega-3, Prebiotics, Superfoods Greens for Workout and Recovery, 0g Net Carbs, Zero Sugar, Keto  4) Ripple Vegan Protein Shake  Chocolate  20g Nutritious Plant Based Pea Protein  Shelf Stable  No GMOs, Soy, Nut, Gluten, Lactose   5) Solle Naturals Glucose Support 1.2 Plant based, dairy free, low glycemic index  https://LiquidCompass.Vivoxid/products/glucose-support-1-2-vanilla     Goal 2:  Try to have 1 snack daily between meals at 3pm and 1-2 hours before bed depending on blood sugars that includes 1 fruit serving 15g of carbs and healthy fat 5-15 grams and or lean protein 7-12g for the next eight weeks.   - see meal plan and recipe ideas in the cardiometabolic guides provided.         2 hard boiled eggs.     Greek Yogurt with Blackberries    Fresh Yellow Pear or veggie (radishes, bell peppers, baby cucumbers, carrots or sugar snap peas) with Hummus - try the mini to go packs to help with portion control     Marinated Olives* and Kefir     Purple Plum with Mixed Nuts    Celery with [place] Butter    Dark Chocolate, 70% or higher Cocoa and Pistachio Nuts    Banana with  almond butter or sunflower seed butter     Apple with peanut butter topped with cinnamon     Baby cucumber or carrots, bell pepper, radishes or sugar snap peas with individual servings of guac     Low sodium cottage with peaches or mandarin oranges     Sunflower seeds with strawberries     Turkey jerky or 100% grass fed beef jerky     Plant based protein bar     Simple Mills [place] Flour Crackers or Libby Gone Cracker dip with hummus or guac     Avocado on slice of gluten free bread recommend base culture brand or 100% whole grain bread       Goal 3: Write down what your eating, times, blood sugars just maybe hour hours after eating and when wake up in am. Track how many carbs, sugar and fiber having at first than worry about protein and fat later.     Mediterranean Approach: Eat whole, unprocessed real foods in their unprocessed forms such as fruits, vegetables, whole grains (prefer gluten free), nuts, legumes, extra virgin olive oil, spices, modest amounts of poultry, and fish.        Cardiometabolic Food Plan: 6425-5172 calories per day     Protein 6-7 servings per day  - include at each meal to stabilize blood sugars   (Choose 3oz or 21g per meal and aim for 1oz of 7g for snacks)   - Strive for 1-2 servings of fish per week especially of higher omega-3 fatty acid containing fish such as salmon.     Legumes 1-2 serving per day     Dairy alternatives 2-3 serving per day     Nuts and seeds 2-3 servings per day - great to incorporate as snacks     Fats and oils 3-4 servings per day     Non starchy vegetables 8-10 servings per day     Starchy vegetable limit 1 serving per day as they tend to impact blood sugar (they are moderate-GI).     Fruits 2 servings per day - best to couple with a little bit of protein or fat to offset a rise in blood sugars (they are low-moderate-GI foods).     Whole grains 2 serving per day - try gluten free whole grains for more variety    Choose Low Glycemic (GI) foods: Regulate your sugar  levels by eating foods that do not spike blood sugars.  Eat low -GI foods so only small fluctuations in blood glucose and insulin levels are produced.      Examples of low-GI foods: nuts, seeds, GF oats, most vegetables especially non-starchy and fruits.     Medium or high-GI foods should be eaten with a protein or fat, both of which blunt the glycemic effect of these foods. This reduces the overall glycemic impact of a meal.   Ex: Most grains and starchy veggies are medium/high GI.       Avoid foods containing refined sugars, artificial sweeteners, and refined grains they are considered high-GI because they lead to sharp increases in blood sugars levels, which increase insulin sensitivity causing increased TG, and low good cholesterol (HDL).   Ex: cakes, cookies, pies, bread, sodas, fruit drinks, presweetened tea, coffee drinks, energy or sport drinks, flavored milk and other processed foods.     Choose foods high in fiber: Aim for at least 5 grams of fiber per serving of food or a total of 25-35 grams fiber per day. Remember, when looking at the label, you can take the fiber away from the total carbs. Ex:15g of total carbs - 4g of fiber = 11g net carbs     Insoluble fiber acts like a bulky  inner broom,  sweeping out debris from the intestine and creating more motility and movement.      Soluble fiber attracts water and swells, creating a gel that slows digestion.  Also, slows the release of glucose from foods into the blood which stops spikes in blood sugar levels.  Soluble fiber traps toxins in the gut, helping to carry them to excretion and provides healthy bacteria in the digestive tract.     Choose High Quality Fats: Adding anti-inflammatory fats into your diet such as fish (salmon, herring, mackerel, and sardines), omega 3 eggs, hawa seeds, ground flax seeds/milk, hemp seeds/milk, walnuts and some other certain leafy greens will increase omega-3 fats to omeaga-6 fats ratio.     Therapeutic fats both  monounsaturated and polyunsaturated to include daily: ground flaxseeds, unsalted mixed nuts, avocados, olives, extra-virgin olive oil.     Emphasize high-quality oils and fats in the diet daily such as avocado oil, coconut oil, flaxseed, olive, sesame. Ex: 1 tsp to 1 tbsp of MCT oil from coconuts can be added into coffee, smoothies, and salad dressings per day.     Avoid trans fats found in processed foods       NUTRITION RESOURCES:  1. IFM Cardiometabolic Packet 1400    Functional Nutrition Fundamentals     Comprehensive Guide    Meal plan with recipes       PATIENT'S BEHAVIOR CHANGE GOALS:   See nutrition intervention for patient stated behavior change goals.     MONITOR / EVALUATE:  Registered Dietitian will monitor/evaluate the following:     Beliefs and attitudes related to food    Food and nutrition knowledge / skills    Food / Beverage / Nutrient intake     Pertinent Labs    Progress toward meeting stated nutrition-related goals    Readiness to change nutrition-related behaviors    Weight change    Digestion     COORDINATION OF CARE:  Follow up with endocrinology and primary care provider       FOLLOW-UP:  Follow-up appointment scheduled on May 2nd at 11am.       Time spent in minutes: 30 minutes 2 units   Encounter: Individual    Serena Caro RD, CLT, LD  Integrative Registered Dietitian

## 2023-04-11 NOTE — PATIENT INSTRUCTIONS
NUTRITION INTERVENTION:   Nutrition Education (Application):  Cardiometabolic Food Plan Materials by [employer]     Discussed healthy eating patters, portion sizes for healthy fats and proteins and carbs    Reviewed and planned out some recipes for breakfast and snacks that included protein and healthy fats     Long Term Goals:   Goal: Decrease fasting blood sugars to 70-99 mg/dL within 1-6 months  Goal: Decrease Hemoglobin A1c <7 % within 2-6 months   Goal: Lose 20-24lbs in 6 months, recommend average 1-2lbs per week of weight loss.   Goal: Increase protein intake to help normalize HGB levels to 11.7-15.7 g/dL         Short Term Goals:  Goal 1: Focus on having a healthy balanced breakfast daily for the next six weeks. - focus on lean proteins 2 serving 12g protein  Incorporate a whole grain at breakfast ex continue oatmeal naturally gluten free with more healthy fats such as handful of walnuts and 1 fruit serving 15g of carbs such as blueberries. Focus on adding in a lean protein as side such as 2 eggs, 2 turkey or chicken sausage.    Try a smoothie 1-2x a week for breakfast for the next six weeks see recipe discussed. add in unsweetened flax, hemp or coconut milk, 1 tbsp of healthy fat such as flax seed ground or hawa seed ground. serving of fruit 1 cup of berries. Veggie (optional), 1 scoop of plant based protein powder or collagen powder, 1 serving of fruit and veggie such as kale, spinach, or veggie powder even spirulina     Avocado on slice of gluten free bread recommend base culture brand      [email or web address].[email or web address]     Birch mobley brand for pancakes - paleo  https://[employer]/collections/pancake-waffle-mix/products/paleo     Try siete soft taco shells with eggs and veggies in am for breakfast    https://Mayomi.Aggios/collections/tortillas/products/cfigpt-lljba-fopoaqksf-6-pack    Incorporate protein powder daily with smoothie:  Plant based hemp (recommended brands: [place], Nutiva, Just Hemp Protein, Kid[employer] Red Mill)    Plant based pea (recommended brands: Naked Pea, Now Sports). If you want to try a combo of pea and hemp the brand Dawson in vanilla or chocolate is a great option.   or collagen peptides -collagen can be used for smoothies, oatmeal and soups. This can help provide essential amino acids and minerals that heal your gut as well as balance blood sugars. A great option if you have a hard time tolerating solids.     Can also consider pre made shakes if unable to make smoothies.      Brand examples that are gluten and dairy free to try:   1) Orgain Vegan Protein Shakes, 20g of Plant Based Protein, Creamy Chocolate - Gluten Free, No Dairy, Soy, or Preservatives, No Added Sugar  2) Pirq, Vegan Protein Shake, Turmeric Curcumin, Perri, Plant-Based Protein Drink, Gluten-Free, Dairy-Free, Soy-Free, Non-GMO, Vegetarian, Kosher, Keto, Low Carb, Low Calorie  3) OWYN Pro Elite Vegan Plant-Based High Protein Shake, 35g Protein, 9 Amino Acids, Omega-3, Prebiotics, Superfoods Greens for Workout and Recovery, 0g Net Carbs, Zero Sugar, Keto  4) Ripple Vegan Protein Shake  Chocolate  20g Nutritious Plant Based Pea Protein  Shelf Stable  No GMOs, Soy, Nut, Gluten, Lactose   5) Meditech Glucose Support 1.2 Plant based, dairy free, low glycemic index  https://Sam[employer] great American bar.X[employer]/products/glucose-support-1-2-vanilla     Goal 2:  Try to have 1 snack daily between meals at 3pm and 1-2 hours before bed depending on blood sugars that includes 1 fruit serving 15g of carbs and healthy fat 5-15 grams and or lean protein 7-12g for the next eight weeks.   - see meal plan and recipe ideas in the cardiometabolic guides provided.       2 hard boiled eggs.   Greek Yogurt with Blackberries  Fresh Yellow Pear or veggie  (radishes, bell peppers, baby cucumbers, carrots or sugar snap peas) with Hummus - try the mini to go packs to help with portion control   Marinated Olives* and Kefir   Purple Plum with Mixed Nuts  Celery with Comerio Butter  Dark Chocolate, 70% or higher Cocoa and Pistachio Nuts  Banana with almond butter or sunflower seed butter   Apple with peanut butter topped with cinnamon   Baby cucumber or carrots, bell pepper, radishes or sugar snap peas with individual servings of guac   Low sodium cottage with peaches or mandarin oranges   Sunflower seeds with strawberries   Turkey jerky or 100% grass fed beef jerky   Plant based protein bar   Simple Mills Comerio Flour Crackers or Libby Gone Cracker dip with hummus or guac   Avocado on slice of gluten free bread recommend base culture brand or 100% whole grain bread       Goal 3: Write down what your eating, times, blood sugars just maybe hour hours after eating and when wake up in am. Track how many carbs, sugar and fiber having at first than worry about protein and fat later.     Mediterranean Approach: Eat whole, unprocessed real foods in their unprocessed forms such as fruits, vegetables, whole grains (prefer gluten free), nuts, legumes, extra virgin olive oil, spices, modest amounts of poultry, and fish.        Cardiometabolic Food Plan: 3962-1865 calories per day   Protein 6-7 servings per day  - include at each meal to stabilize blood sugars   (Choose 3oz or 21g per meal and aim for 1oz of 7g for snacks)   Strive for 1-2 servings of fish per week especially of higher omega-3 fatty acid containing fish such as salmon.   Legumes 1-2 serving per day   Dairy alternatives 2-3 serving per day   Nuts and seeds 2-3 servings per day - great to incorporate as snacks   Fats and oils 3-4 servings per day   Non starchy vegetables 8-10 servings per day   Starchy vegetable limit 1 serving per day as they tend to impact blood sugar (they are moderate-GI).   Fruits 2 servings per day  - best to couple with a little bit of protein or fat to offset a rise in blood sugars (they are low-moderate-GI foods).   Whole grains 2 serving per day - try gluten free whole grains for more variety    Choose Low Glycemic (GI) foods: Regulate your sugar levels by eating foods that do not spike blood sugars.  Eat low -GI foods so only small fluctuations in blood glucose and insulin levels are produced.      Examples of low-GI foods: nuts, seeds, GF oats, most vegetables especially non-starchy and fruits.     Medium or high-GI foods should be eaten with a protein or fat, both of which blunt the glycemic effect of these foods. This reduces the overall glycemic impact of a meal.   Ex: Most grains and starchy veggies are medium/high GI.       Avoid foods containing refined sugars, artificial sweeteners, and refined grains they are considered high-GI because they lead to sharp increases in blood sugars levels, which increase insulin sensitivity causing increased TG, and low good cholesterol (HDL).   Ex: cakes, cookies, pies, bread, sodas, fruit drinks, presweetened tea, coffee drinks, energy or sport drinks, flavored milk and other processed foods.     Choose foods high in fiber: Aim for at least 5 grams of fiber per serving of food or a total of 25-35 grams fiber per day. Remember, when looking at the label, you can take the fiber away from the total carbs. Ex:15g of total carbs - 4g of fiber = 11g net carbs     Insoluble fiber acts like a bulky  inner broom,  sweeping out debris from the intestine and creating more motility and movement.      Soluble fiber attracts water and swells, creating a gel that slows digestion.  Also, slows the release of glucose from foods into the blood which stops spikes in blood sugar levels.  Soluble fiber traps toxins in the gut, helping to carry them to excretion and provides healthy bacteria in the digestive tract.     Choose High Quality Fats: Adding anti-inflammatory fats into your  diet such as fish (salmon, herring, mackerel, and sardines), omega 3 eggs, hawa seeds, ground flax seeds/milk, hemp seeds/milk, walnuts and some other certain leafy greens will increase omega-3 fats to omeaga-6 fats ratio.     Therapeutic fats both monounsaturated and polyunsaturated to include daily: ground flaxseeds, unsalted mixed nuts, avocados, olives, extra-virgin olive oil.     Emphasize high-quality oils and fats in the diet daily such as avocado oil, coconut oil, flaxseed, olive, sesame. Ex: 1 tsp to 1 tbsp of MCT oil from coconuts can be added into coffee, smoothies, and salad dressings per day.   Avoid trans fats found in processed foods       NUTRITION RESOURCES:  IFM Cardiometabolic Packet 1400  Functional Nutrition Fundamentals   Comprehensive Guide  Meal plan with recipes

## 2023-04-12 ENCOUNTER — TELEPHONE (OUTPATIENT)
Dept: FAMILY MEDICINE | Facility: CLINIC | Age: 68
End: 2023-04-12
Payer: COMMERCIAL

## 2023-04-12 DIAGNOSIS — F42.9 OBSESSIVE-COMPULSIVE DISORDER, UNSPECIFIED TYPE: ICD-10-CM

## 2023-04-12 DIAGNOSIS — F32.3 SEVERE MAJOR DEPRESSION WITH PSYCHOTIC FEATURES (H): ICD-10-CM

## 2023-04-12 DIAGNOSIS — F25.9 SCHIZOAFFECTIVE DISORDER, UNSPECIFIED TYPE (H): Primary | ICD-10-CM

## 2023-04-12 NOTE — TELEPHONE ENCOUNTER
Order/Referral Request    Who is requesting: Patient    Orders being requested: Referral for Mental Health    Reason service is needed/diagnosis: PT prefers to go through [facility] not [facility]    When are orders needed by: Asap      Does patient have a preference on a Group/Provider/Facility? no    Does patient have an appointment scheduled?: No    Where to send orders: Place in Epic    Could we send this information to you in [facility] or would you prefer to receive a phone call?:   No preference   Okay to leave a detailed message?: Yes at Cell number on file:    Telephone Information:   Mobile [phone]

## 2023-04-18 ENCOUNTER — VIRTUAL VISIT (OUTPATIENT)
Dept: EDUCATION SERVICES | Facility: CLINIC | Age: 68
End: 2023-04-18
Payer: COMMERCIAL

## 2023-04-18 ENCOUNTER — VIRTUAL VISIT (OUTPATIENT)
Dept: FAMILY MEDICINE | Facility: CLINIC | Age: 68
End: 2023-04-18
Payer: COMMERCIAL

## 2023-04-18 DIAGNOSIS — E11.65 TYPE 2 DIABETES MELLITUS WITH HYPERGLYCEMIA, WITH LONG-TERM CURRENT USE OF INSULIN (H): ICD-10-CM

## 2023-04-18 DIAGNOSIS — Z79.4 TYPE 2 DIABETES MELLITUS WITH HYPERGLYCEMIA, WITH LONG-TERM CURRENT USE OF INSULIN (H): ICD-10-CM

## 2023-04-18 DIAGNOSIS — M17.11 PRIMARY OSTEOARTHRITIS OF RIGHT KNEE: Primary | ICD-10-CM

## 2023-04-18 DIAGNOSIS — E11.65 TYPE 2 DIABETES MELLITUS WITH HYPERGLYCEMIA, WITH LONG-TERM CURRENT USE OF INSULIN (H): Primary | ICD-10-CM

## 2023-04-18 DIAGNOSIS — F11.90 OPIOID USE DISORDER: ICD-10-CM

## 2023-04-18 DIAGNOSIS — G89.29 CHRONIC PAIN OF RIGHT KNEE: ICD-10-CM

## 2023-04-18 DIAGNOSIS — E66.01 MORBID OBESITY (H): ICD-10-CM

## 2023-04-18 DIAGNOSIS — M25.561 CHRONIC PAIN OF RIGHT KNEE: ICD-10-CM

## 2023-04-18 DIAGNOSIS — Z79.4 TYPE 2 DIABETES MELLITUS WITH HYPERGLYCEMIA, WITH LONG-TERM CURRENT USE OF INSULIN (H): Primary | ICD-10-CM

## 2023-04-18 PROCEDURE — 99442 PR PHYSICIAN TELEPHONE EVALUATION 11-20 MIN: CPT | Mod: 93

## 2023-04-18 PROCEDURE — 99441 PR PHYSICIAN TELEPHONE EVALUATION 5-10 MIN: CPT | Mod: 95 | Performed by: INTERNAL MEDICINE

## 2023-04-18 RX ORDER — HYDROCODONE BITARTRATE AND ACETAMINOPHEN 5; 325 MG/1; MG/1
1 TABLET ORAL 3 TIMES DAILY PRN
Qty: 42 TABLET | Refills: 0 | Status: SHIPPED | OUTPATIENT
Start: 2023-04-18 | End: 2023-05-03

## 2023-04-18 NOTE — PROGRESS NOTES
Holly is a 68 year old who is being evaluated via a billable telephone visit.      What phone number would you like to be contacted at? [phone]   How would you like to obtain your AVS? Dinesh  Distant Location (provider location):  Off-site    Assessment & Plan     Holly was seen today for results.    Diagnoses and all orders for this visit:    Primary osteoarthritis of right knee  -     HYDROcodone-acetaminophen (NORCO) 5-325 MG tablet; Take 1 tablet by mouth 3 times daily as needed for severe pain    Type 2 diabetes mellitus with hyperglycemia, with long-term current use of insulin (H)  -     Hemoglobin A1c; Future    Morbid obesity (H)  Comments:  per BMI 2023.     Chronic pain of right knee  -     HYDROcodone-acetaminophen (NORCO) 5-325 MG tablet; Take 1 tablet by mouth 3 times daily as needed for severe pain    Opioid use disorder  -     HYDROcodone-acetaminophen (NORCO) 5-325 MG tablet; Take 1 tablet by mouth 3 times daily as needed for severe pain    Other orders  -     PRIMARY CARE FOLLOW-UP SCHEDULING; Future      Discontinue Tramadol, vicodin 3 tablets per day for 2 weeks for now.   Controlled substance agreement  2023. Pt will sign new one and get urine drug screen done with the A1c.       Return wellness visit in 3 months and chronic disease follow up.      I spent a total of 28 minutes on the day of the visit.   doing chart review, history and exam, documentation and further activities as noted above      Tiffany King MD PhD  [facility]    Rebeka Barrera is a 68 year old, presenting for the following health issues:  Results         View : No data to display.              History of Present Illness       Reason for visit:  Lab and knee pain    She eats 2-3 servings of fruits and vegetables daily.She consumes 1 sweetened beverage(s) daily.She exercises with enough effort to increase her heart rate 9 or less minutes per day.  She exercises with enough effort to  increase her heart rate 3 or less days per week. She is missing 2 dose(s) of medications per week.  She is not taking prescribed medications regularly due to remembering to take.     Pt requesting to discuss lab and knee replacement  Pt has been referred to orthopedic surgeon for right knee replacement. She is in a lot of pain. The bones cracks with she walks. Now her left knee is beginning to act like the right knee. Tramadol didn't help with his pain. Dr. Harrington prescribed Hydrocodone. It helps a little bit. She takes it 3 times a day at 7 am, 3 pm and 9 pm. She has used up the 30 tabs prescribed by Dr. Harrington. Pt has controlled substance agreement with me for tramadol.     Lately glucose readings have improved to 99-low 100s. Her eating habits have changed quite a bit. More vegetables and less candies and sweets. She would like to get the A1c done to see if there is improvement so she can get surgery done.     Review of Systems   Constitutional, HEENT, cardiovascular, pulmonary, gi and gu systems are negative, except as otherwise noted.      Objective    Vitals - Patient Reported  Pain Score: Extreme Pain (9)  Pain Loc: Knee (Right knee)      Vitals:  No vitals were obtained today due to virtual visit.    Physical Exam   healthy, alert and no distress  PSYCH: Alert and oriented times 3; coherent speech, normal   rate and volume, able to articulate logical thoughts, able   to abstract reason, no tangential thoughts, no hallucinations   or delusions  Her affect is normal  RESP: No cough, no audible wheezing, able to talk in full sentences  Remainder of exam unable to be completed due to telephone visits            Phone call duration: 11 minutes

## 2023-04-18 NOTE — LETTER
4/18/2023         RE: Holly Muir  [address] [address] [facility] [address]        Dear Colleague,    Thank you for referring your patient, Holly Muir, to the [facility]. Please see a copy of my visit note below.    Diabetes Self-Management Education & Support Telephone Visit  Holly Muir contacted today for education related to Type 2 diabetes    Patient is being treated with:  oral agents and insulin    Purpose of today's visit:  Review blood sugars    Subjective/Objective:    Current Regimen:  Lantus 40 units daily  Metformin 2 tablets breakfast, 1 tablet dinner  Glimepiride 2 tablets with breakfast and dinner  Pioglitazone 45 mg in morning  *Missed 1-2 evening doses of oral meds in the past few weeks        Assessment/Plan:  BG significantly improved with medication compliance and dietary changes. Pattern of post prandial highs after breakfast and dinner. Daughters have been closely monitoring diet. A few nights where she skirted near 70. No change to Lantus dose. Increase Metformin to 2000mg daily. Follow up 5/3 via Telephone.    Any diabetes medication dose changes were made via the CDE Protocol and Collaborative Practice Agreement. A copy of this encounter was provided to the patient's referring provider.    Argelia Maciel RN, Diabetes Educator  Diabetes Education Department  [facility], [facility] [place] [facility] and [place]  [phone]    Time spent in this visit:15 minutes          Again, thank you for allowing me to participate in the care of your patient.        Sincerely,        Argelia Maciel RN

## 2023-04-18 NOTE — LETTER
Opioid / Opioid Plus Controlled Substance Agreement    This is an agreement between you and your provider about the safe and appropriate use of controlled substance/opioids prescribed by your care team. Controlled substances are medicines that can cause physical and mental dependence (abuse).    There are strict laws about having and using these medicines. We here at [facility] are committing to working with you in your efforts to get better. To support you in this work, we ll help you schedule regular office appointments for medicine refills. If we must cancel or change your appointment for any reason, we ll make sure you have enough medicine to last until your next appointment.     As a Provider, I will:  Listen carefully to your concerns and treat you with respect.   Recommend a treatment plan that I believe is in your best interest. This plan may involve therapies other than opioid pain medication.   Talk with you often about the possible benefits, and the risk of harm of any medicine that we prescribe for you.   Provide a plan on how to taper (discontinue or go off) using this medicine if the decision is made to stop its use.    As a Patient, I understand that opioid(s):   Are a controlled substance prescribed by my care team to help me function or work and manage my condition(s).   Are strong medicines and can cause serious side effects such as:  Drowsiness, which can seriously affect my driving ability  A lower breathing rate, enough to cause death  Harm to my thinking ability   Depression   Abuse of and addiction to this medicine  Need to be taken exactly as prescribed. Combining opioids with certain medicines or chemicals (such as illegal drugs, sedatives, sleeping pills, and benzodiazepines) can be dangerous or even fatal. If I stop opioids suddenly, I may have severe withdrawal symptoms.  Do not work for all types of pain nor for all patients. If they re not helpful, I may be asked to stop  them.        The risks, benefits and side effects of these medicine(s) were explained to me. I agree that:  I will take part in other treatments as advised by my care team. This may be psychiatry or counseling, physical therapy, behavioral therapy, group treatment or a referral to a specialist.     I will keep all my appointments. I understand that this is part of the monitoring of opioids. My care team may require an office visit for EVERY opioid/controlled substance refill. If I miss appointments or don t follow instructions, my care team may stop my medicine.    I will take my medicines as prescribed. I will not change the dose or schedule unless my care team tells me to. There will be no refills if I run out early.     I may be asked to come to the clinic and complete a urine drug test or complete a pill count at any time. If I don t give a urine sample or participate in a pill count, the care team may stop my medicine.    I will only receive prescriptions from this clinic for chronic pain. If I am treated by another provider for acute pain issues, I will tell them that I am taking opioid pain medication for chronic pain and that I have a treatment agreement with this provider. I will inform my [facility] care team within one business day if I am given a prescription for any pain medication by another healthcare provider. My [facility] care team can contact other providers and pharmacists about my use of any medicines.    It is up to me to make sure that I don t run out of my medicines on weekends or holidays. If my care team is willing to refill my opioid prescription without a visit, I must request refills only during office hours. Refills may take up to 3 business days to process. I will use one pharmacy to fill all my opioid and other controlled substance prescriptions. I will notify the clinic about any changes to my insurance or medication availability.    I am responsible for my  prescriptions. If the medicine/prescription is lost, stolen or destroyed, it will not be replaced. I also agree not to share controlled substance medicines with anyone.    I am aware I should not use any illegal or recreational drugs. I agree not to drink alcohol unless my care team says I can.       If I enroll in the [place] Medical Cannabis program, I will tell my care team prior to my next refill.     I will tell my care team right away if I become pregnant, have a new medical problem treated outside of my regular clinic, or have a change in my medications.    I understand that this medicine can affect my thinking, judgment and reaction time. Alcohol and drugs affect the brain and body, which can affect the safety of my driving. Being under the influence of alcohol or drugs can affect my decision-making, behaviors, personal safety, and the safety of others. Driving while impaired (DWI) can occur if a person is driving, operating, or in physical control of a car, motorcycle, boat, snowmobile, ATV, motorbike, off-road vehicle, or any other motor vehicle ([place] Statute 169A.20). I understand the risk if I choose to drive or operate any vehicle or machinery.    I understand that if I do not follow any of the conditions above, my prescriptions or treatment may be stopped or changed.          Opioids  What You Need to Know    What are opioids?   Opioids are pain medicines that must be prescribed by a doctor. They are also known as narcotics.     Examples are:   morphine (MS Contin, Maribell)  oxycodone (Oxycontin)  oxycodone and acetaminophen (Percocet)  hydrocodone and acetaminophen (Vicodin, Norco)   fentanyl patch (Duragesic)   hydromorphone (Dilaudid)   methadone  codeine (Tylenol #3)     What do opioids do well?   Opioids are best for severe short-term pain such as after a surgery or injury. They may work well for cancer pain. They may help some people with long-lasting (chronic) pain.     What do opioids NOT do  well?   Opioids never get rid of pain entirely, and they don t work well for most patients with chronic pain. Opioids don t reduce swelling, one of the causes of pain.                                    Other ways to manage chronic pain and improve function include:     Treat the health problem that may be causing pain  Anti-inflammation medicines, which reduce swelling and tenderness, such as ibuprofen (Advil, Motrin) or naproxen (Aleve)  Acetaminophen (Tylenol)  Antidepressants and anti-seizure medicines, especially for nerve pain  Topical treatments such as patches or creams  Injections or nerve blocks  Chiropractic or osteopathic treatment  Acupuncture, massage, deep breathing, meditation, visual imagery, aromatherapy  Use heat or ice at the pain site  Physical therapy   Exercise  Stop smoking  Take part in therapy       Risks and side effects     Talk to your doctor before you start or decide to keep taking opioids. Possible side effects include:    Lowering your breathing rate enough to cause death  Overdose, including death, especially if taking higher than prescribed doses  Worse depression symptoms; less pleasure in things you usually enjoy  Feeling tired or sluggish  Slower thoughts or cloudy thinking  Being more sensitive to pain over time; pain is harder to control  Trouble sleeping or restless sleep  Changes in hormone levels (for example, less testosterone)  Changes in sex drive or ability to have sex  Constipation  Unsafe driving  Itching and sweating  Dizziness  Nausea, throwing up and dry mouth    What else should I know about opioids?    Opioids may lead to dependence, tolerance, or addiction.    Dependence means that if you stop or reduce the medicine too quickly, you will have withdrawal symptoms. These include loose poop (diarrhea), jitters, flu-like symptoms, nervousness and tremors. Dependence is not the same as addiction.                     Tolerance means needing higher doses over time to  get the same effect. This may increase the chance of serious side effects.    Addiction is when people improperly use a substance that harms their body, their mind or their relations with others. Use of opiates can cause a relapse of addiction if you have a history of drug or alcohol abuse.    People who have used opioids for a long time may have a lower quality of life, worse depression, higher levels of pain and more visits to doctors.    You can overdose on opioids. Take these steps to lower your risk of overdose:    Recognize the signs:  Signs of overdose include decrease or loss of consciousness (blackout), slowed breathing, trouble waking up and blue lips. If someone is worried about overdose, they should call 911.    Talk to your doctor about Narcan (naloxone).   If you are at risk for overdose, you may be given a prescription for Narcan. This medicine very quickly reverses the effects of opioids.   If you overdose, a friend or family member can give you Narcan while waiting for the ambulance. They need to know the signs of overdose and how to give Narcan.     Don't use alcohol or street drugs.   Taking them with opioids can cause death.    Do not take any of these medicines unless your doctor says it s OK. Taking these with opioids can cause death:  Benzodiazepines, such as lorazepam (Ativan), alprazolam (Xanax) or diazepam (Valium)  Muscle relaxers, such as cyclobenzaprine (Flexeril)  Sleeping pills like zolpidem (Ambien)   Other opioids      How to keep you and other people safe while taking opioids:    Never share your opioids with others.  Opioid medicines are regulated by the Drug Enforcement Agency (DOUG). Selling or sharing medications is a criminal act.    2. Be sure to store opioids in a secure place, locked up if possible. Young children can easily swallow them and overdose.    3. When you are traveling with your medicines, keep them in the original bottles. If you use a pill box, be sure you also  carry a copy of your medicine list from your clinic or pharmacy.    4. Safe disposal of opioids    Most pharmacies have places to get rid of medicine, called disposal kiosks. Medicine disposal options are also available in every [facility]. Search your county and  medication disposal  to find more options. You can find more details at:  [email or web address]://[email or web address]-[email or web address]/[email or web address]-medications     I agree that my provider, clinic care team, and pharmacy may work with any city, state or federal law enforcement agency that investigates the misuse, sale, or other diversion of my controlled medicine. I will allow my provider to discuss my care with, or share a copy of, this agreement with any other treating provider, pharmacy or emergency room where I receive care.    I have read this agreement and have asked questions about anything I did not understand.    _______________________________________________________  Patient Signature - Holly Muir _____________________                   Date     _______________________________________________________  Provider Signature - Tiffany King MD PhD   _____________________                   Date     _______________________________________________________  Witness Signature (required if provider not present while patient signing)   _____________________                   Date

## 2023-04-18 NOTE — PROGRESS NOTES
Diabetes Self-Management Education & Support Telephone Visit  Holly Muir contacted today for education related to Type 2 diabetes    Patient is being treated with:  oral agents and insulin    Purpose of today's visit:  Review blood sugars    Subjective/Objective:    Current Regimen:  Lantus 40 units daily  Metformin 2 tablets breakfast, 1 tablet dinner  Glimepiride 2 tablets with breakfast and dinner  Pioglitazone 45 mg in morning  *Missed 1-2 evening doses of oral meds in the past few weeks        Assessment/Plan:  BG significantly improved with medication compliance and dietary changes. Pattern of post prandial highs after breakfast and dinner. Daughters have been closely monitoring diet. A few nights where she skirted near 70. No change to Lantus dose. Increase Metformin to 2000mg daily. Follow up 5/3 via Telephone.    Any diabetes medication dose changes were made via the CDE Protocol and Collaborative Practice Agreement. A copy of this encounter was provided to the patient's referring provider.    Argelia Maciel RN, Diabetes Educator  Diabetes Education Department  [facility], [place] and [place]  [phone]    Time spent in this visit:15 minutes

## 2023-04-20 ENCOUNTER — TELEPHONE (OUTPATIENT)
Dept: FAMILY MEDICINE | Facility: CLINIC | Age: 68
End: 2023-04-20
Payer: COMMERCIAL

## 2023-04-20 NOTE — TELEPHONE ENCOUNTER
Forms/Letter Request    Type of form/letter: ARE - transportation    Have you been seen for this request: N/A    Do we have the form/letter: Yes: Will place on providers desk for review/signature    When is form/letter needed by: asap    How would you like the form/letter returned: Fax : [phone]

## 2023-04-26 ENCOUNTER — LAB (OUTPATIENT)
Dept: LAB | Facility: CLINIC | Age: 68
End: 2023-04-26
Payer: COMMERCIAL

## 2023-04-26 DIAGNOSIS — G89.4 CHRONIC PAIN SYNDROME: ICD-10-CM

## 2023-04-26 DIAGNOSIS — Z79.4 TYPE 2 DIABETES MELLITUS WITH HYPERGLYCEMIA, WITH LONG-TERM CURRENT USE OF INSULIN (H): ICD-10-CM

## 2023-04-26 DIAGNOSIS — E11.65 TYPE 2 DIABETES MELLITUS WITH HYPERGLYCEMIA, WITH LONG-TERM CURRENT USE OF INSULIN (H): ICD-10-CM

## 2023-04-26 DIAGNOSIS — F11.20 CONTINUOUS OPIOID DEPENDENCE (H): ICD-10-CM

## 2023-04-26 LAB — HBA1C MFR BLD: 8.7 % (ref 0–5.6)

## 2023-04-26 PROCEDURE — 83036 HEMOGLOBIN GLYCOSYLATED A1C: CPT

## 2023-04-26 PROCEDURE — 36415 COLL VENOUS BLD VENIPUNCTURE: CPT

## 2023-04-26 NOTE — RESULT ENCOUNTER NOTE
-- A1c has improved some. I am not sure if this is good enough for surgery. You can share the result with your orthopedist. I will forward this to Dr. Medrano, your endocrinologist, and Argelia Maciel, diabetes educator, to review as well.     Please call or Mychart to our office if you have further questions.     Tiffany King MD-PhD

## 2023-04-27 PROCEDURE — 80307 DRUG TEST PRSMV CHEM ANLYZR: CPT

## 2023-04-27 PROCEDURE — 82570 ASSAY OF URINE CREATININE: CPT

## 2023-04-27 PROCEDURE — 82043 UR ALBUMIN QUANTITATIVE: CPT

## 2023-04-28 LAB
CANNABINOIDS UR QL SCN: NORMAL
CREAT UR-MCNC: 172 MG/DL
CREAT UR-MCNC: 172 MG/DL
MICROALBUMIN UR-MCNC: 70 MG/L
MICROALBUMIN/CREAT UR: 40.7 MG/G CR (ref 0–25)

## 2023-04-28 NOTE — RESULT ENCOUNTER NOTE
These test result(s) are within acceptable range or at baseline.     Please call or Mychart to our office if you have further questions.     Tiffany King MD-PhD

## 2023-05-01 LAB
DHC UR CFM-MCNC: 328 NG/ML
DHC/CREAT UR: 191 NG/MG {CREAT}
GABAPENTIN UR QL CFM: PRESENT
HYDROCODONE UR CFM-MCNC: 1660 NG/ML
HYDROCODONE/CREAT UR: 965 NG/MG {CREAT}

## 2023-05-02 DIAGNOSIS — M25.561 CHRONIC PAIN OF RIGHT KNEE: ICD-10-CM

## 2023-05-02 DIAGNOSIS — F11.90 OPIOID USE DISORDER: ICD-10-CM

## 2023-05-02 DIAGNOSIS — G89.29 CHRONIC PAIN OF RIGHT KNEE: ICD-10-CM

## 2023-05-02 DIAGNOSIS — M17.11 PRIMARY OSTEOARTHRITIS OF RIGHT KNEE: ICD-10-CM

## 2023-05-02 NOTE — TELEPHONE ENCOUNTER
Patient called to get a refill on HYDROcodone-acetaminophen (NORCO) 5-325 MG tablet.  Last Rx was given on 4/18/23, #42 tablets. One tablet 3 x daily. End date of 5/2/23.     Patient also wanted to update PCP that she went to [facility] ER earlier today due to severe right knee pain and calf pain, concerned for possible DVT.  Us performed, ruled out a DVT.   Permission given by patient to update records from [facility] Care Everywhere. All records are now in Chart Review.    Patient was NOT given any pain medication while in ER or discharged to home with any prescriptions. ER provider advised for patient to contact her PCP office, hence call today.    Routing to provider to review and advise.      Fanny Souza RN  Clinical Triage/Primary Care  [facility]

## 2023-05-03 ENCOUNTER — TELEPHONE (OUTPATIENT)
Dept: FAMILY MEDICINE | Facility: CLINIC | Age: 68
End: 2023-05-03

## 2023-05-03 ENCOUNTER — VIRTUAL VISIT (OUTPATIENT)
Dept: EDUCATION SERVICES | Facility: CLINIC | Age: 68
End: 2023-05-03
Payer: COMMERCIAL

## 2023-05-03 DIAGNOSIS — Z79.4 TYPE 2 DIABETES MELLITUS WITH HYPERGLYCEMIA, WITH LONG-TERM CURRENT USE OF INSULIN (H): Primary | ICD-10-CM

## 2023-05-03 DIAGNOSIS — M54.50 CHRONIC BILATERAL LOW BACK PAIN WITHOUT SCIATICA: Primary | ICD-10-CM

## 2023-05-03 DIAGNOSIS — E11.65 TYPE 2 DIABETES MELLITUS WITH HYPERGLYCEMIA, WITH LONG-TERM CURRENT USE OF INSULIN (H): Primary | ICD-10-CM

## 2023-05-03 DIAGNOSIS — M17.11 PRIMARY OSTEOARTHRITIS OF RIGHT KNEE: ICD-10-CM

## 2023-05-03 DIAGNOSIS — G89.29 CHRONIC BILATERAL LOW BACK PAIN WITHOUT SCIATICA: Primary | ICD-10-CM

## 2023-05-03 PROCEDURE — G0108 DIAB MANAGE TRN  PER INDIV: HCPCS | Mod: VID

## 2023-05-03 RX ORDER — HYDROCODONE BITARTRATE AND ACETAMINOPHEN 5; 325 MG/1; MG/1
1 TABLET ORAL 3 TIMES DAILY PRN
Qty: 42 TABLET | Refills: 0 | Status: SHIPPED | OUTPATIENT
Start: 2023-05-03 | End: 2023-05-25

## 2023-05-03 NOTE — PROGRESS NOTES
Video-Visit Details  Type of service:  Video Visit  Patient consented to video visit  Video Start Time:  11:00 AM  Video End Time:   11:36 PM  Originating Location (pt. Location): Home  Distant Location (provider location):  [facility] DIABETES EDUCATION [place]   Platform used for Video Visit: Telephone    Diabetes Self-Management Education & Support Telephone Visit  Holly Muir contacted today for education related to Type 2 diabetes    Patient is being treated with:  oral agents and insulin    Purpose of today's visit:  Review blood sugars. Sensor fell off 4/29        BG DATA via glucometer  5/3 201 AM, 51=6626, 5351=160  5/2 103 AM, 0200=96, 146-HS  5/1 0330=57, , 123, pre dinner, 169=HS, 4322=941  4/30 , 153 pre lunch, 121 pre dinner      Subjective/Objective:    Current Regimen:  Lantus 35 units daily-started Sunday  Metformin 2 tablets breakfast, 1 tablet dinner  Glimepiride 2 tablets with breakfast and one tablet with dinner-reduced Sunday  Pioglitazone 45 mg in morning    Assessment/Plan:  anand Ivy participated with visit. BG significantly improved with medication compliance and dietary changes. Pattern of post prandial highs however improved this past week. Sensor failed 4/29 so checking with glucometer the past few days. AM fasting above target since they decreased Lantus dose to 35 units and not been giving evening dose of Glimepiride due to some overnight lows. We discussed increasing Lantus dose by 2 units if fasting morning BG is >130. Recommended to take at least one tablet or two tablets especially with higher carb meal. Daughters continue to closely monitor diet. Treats in 90's because she drops quickly. Over treats lows. No change to Lantus dose. Consider increasing Metformin 2000 mg. Follow up in two weeks.    Any diabetes medication dose changes were made via the CDE Protocol and Collaborative Practice Agreement. A copy of this encounter was provided to the  patient's referring provider.    Argelia Maciel, RN, Diabetes Educator  Diabetes Education Department  [facility], [place] and [place]  [phone]    Time spent in this visit: 36 minutes

## 2023-05-03 NOTE — LETTER
5/3/2023         RE: Holly Muir  [id number] [facility] [id number]        Dear Colleague,    Thank you for referring your patient, Holly Muir, to the [facility]. Please see a copy of my visit note below.    Diabetes Self-Management Education & Support Telephone Visit  Holly Muir contacted today for education related to Type 2 diabetes    Patient is being treated with:  oral agents and insulin    Purpose of today's visit:  Review blood sugars. Sensor fell off 4/29        BG DATA via glucometer  5/3 201 AM, 24=4427, 3389=165  5/2 103 AM, 0200=96, 146-HS  5/1 0330=57, , 123, pre dinner, 169=HS, 9249=765  4/30 , 153 pre lunch, 121 pre dinner      Subjective/Objective:    Current Regimen:  Lantus 35 units daily-started Sunday  Metformin 2 tablets breakfast, 1 tablet dinner  Glimepiride 2 tablets with breakfast and one tablet with dinner-reduced Sunday  Pioglitazone 45 mg in morning    Assessment/Plan:  BG significantly improved with medication compliance and dietary changes. Pattern of post prandial highs however improved this past week. Sensor failed 4/29 so checking with glucometer the past few days. AM fasting above target since they decreased Lantus dose to 35 units and not been giving evening dose of Glimepiride due to some overnight lows. We discussed increasing Lantus dose by 2 units if fasting morning BG is >130. Recommended to take at least one tablet or two tablets especially with higher carb meal. Daughters continue to closely monitor diet. Treats in 90's because she drops quickly. Over treats lows. No change to Lantus dose. Consider increasing Metformin 2000 mg. Follow up in two weeks.    Any diabetes medication dose changes were made via the CDE Protocol and Collaborative Practice Agreement. A copy of this encounter was provided to the patient's referring provider.    Argelia Maciel RN, Diabetes Educator  Diabetes Education  Department  [facility], [facility] and [place]  [phone]    Time spent in this visit: 36 minutes          Again, thank you for allowing me to participate in the care of your patient.        Sincerely,        Argelia Maciel RN

## 2023-05-03 NOTE — TELEPHONE ENCOUNTER
Forms/Letter Request    Type of form/letter: LENKA [place]    Have you been seen for this request: N/A    Do we have the form/letter: Yes: Will place forms on providers desk for review/signature    When is form/letter needed by: asap    How would you like the form/letter returned: Fax : [phone]

## 2023-05-04 ENCOUNTER — OFFICE VISIT (OUTPATIENT)
Dept: ORTHOPEDICS | Facility: CLINIC | Age: 68
End: 2023-05-04
Payer: COMMERCIAL

## 2023-05-04 ENCOUNTER — PATIENT OUTREACH (OUTPATIENT)
Dept: GERIATRIC MEDICINE | Facility: CLINIC | Age: 68
End: 2023-05-04

## 2023-05-04 DIAGNOSIS — M17.11 PRIMARY OSTEOARTHRITIS OF RIGHT KNEE: Primary | ICD-10-CM

## 2023-05-04 DIAGNOSIS — M17.12 PRIMARY OSTEOARTHRITIS OF LEFT KNEE: ICD-10-CM

## 2023-05-04 PROCEDURE — 20611 DRAIN/INJ JOINT/BURSA W/US: CPT | Mod: RT | Performed by: FAMILY MEDICINE

## 2023-05-04 PROCEDURE — 99214 OFFICE O/P EST MOD 30 MIN: CPT | Mod: 25 | Performed by: FAMILY MEDICINE

## 2023-05-04 ASSESSMENT — PAIN SCALES - GENERAL: PAINLEVEL: EXTREME PAIN (8)

## 2023-05-04 NOTE — NURSING NOTE
Chief Complaint   Patient presents with     Left Knee - Follow Up, Pain     Right Knee - Pain, Follow Up       There were no vitals filed for this visit.    There is no height or weight on file to calculate BMI.      LYNDSAY Maya NREMT

## 2023-05-04 NOTE — NURSING NOTE
[facility]   ORTHOPEDICS & SPORTS MEDICINE  [address] [place] [address]  Dept: [phone]  ______________________________________________________________________________    Patient: Holly Muir   : 1955   MRN: [id number]   May 4, 2023    INVASIVE PROCEDURE SAFETY CHECKLIST    Date: 2023   Procedure:Right knee aspiration  Patient Name: Holly Muir  MRN: [id number]  YOB: 1955    Action: Complete sections as appropriate. Any discrepancy results in a HARD COPY until resolved.     PRE PROCEDURE:  Patient ID verified with 2 identifiers (name and  or MRN): Yes  Procedure and site verified with patient/designee (when able): Yes  Accurate consent documentation in medical record: Yes  H&P (or appropriate assessment) documented in medical record: Yes  H&P must be up to 20 days prior to procedure and updates within 24 hours of procedure as applicable: NA  Relevant diagnostic and radiology test results appropriately labeled and displayed as applicable: NA  Procedure site(s) marked with provider initials: NA    TIMEOUT:  Time-Out performed immediately prior to starting procedure, including verbal and active participation of all team members addressing the following:Yes  * Correct patient identify  * Confirmed that the correct side and site are marked  * An accurate procedure consent form  * Agreement on the procedure to be done  * Correct patient position  * Relevant images and results are properly labeled and appropriately displayed  * The need to administer antibiotics or fluids for irrigation purposes during the procedure as applicable   * Safety precautions based on patient history or medication use    DURING PROCEDURE: Verification of correct person, site, and procedures any time the responsibility for care of the patient is transferred to another member of the care team.       Prior to injection, verified patient identity using patient's name and date of  birth.  Due to injection administration, patient instructed to remain in clinic for 15 minutes  afterwards, and to report any adverse reaction to me immediately.      Right knee aspiration      Francesco Toledo, EMT  May 4, 2023

## 2023-05-04 NOTE — PROGRESS NOTES
HISTORY OF PRESENT ILLNESS  Ms. Muir is a pleasant 68 year old female following up with bilateral knee osteoarthritis.  Holly last saw me for her right knee in March of this year, I injected her right knee with corticosteroid on February 27.  I have also prescribed her narcotics in the meantime for her severe pain.  Since last seeing me she was in the emergency department due to severe knee pain, this was on March 17.  She was discharged to home with pain control and a plan to continue conservative care.  Unfortunately her pain is still severe.  She has pain in her left knee as well that is nearly as severe.  She was also recently seen by Dr. Sheehan, unfortunately she is not a surgical candidate until her A1c significantly decreases.     PHYSICAL EXAM  General  - normal appearance, in no obvious distress  Musculoskeletal - right and left knee  - stance: antalgic gait, in wheelchair  - inspection: 1+ effusion on right  - palpation: medial joint line tenderness bilaterally  - ROM: 120 degrees flexion, 0 degrees extension, painful active ROM bilaterally  - strength: 5/5 in flexion, 5/5 in extension  - special tests:ray  (-) varus at 0 and 30 degrees flexion  (-) valgus at 0 and 30 degrees flexion  Neuro  - no sensory or motor deficit, grossly normal coordination, normal muscle tone       ASSESSMENT & PLAN  Ms. Muir is a 68 year old female following up with bilateral knee osteoarthritis.    I had a long discussion with Holly centering around her symptoms, as well as regarding a plan moving forward.    Given her effusion we did decide to aspirate her right knee today.  It is too soon for corticosteroid, but she can continue to keep taking her pain meds as helpful.  She is working on her A1c, but is not a surgical candidate at the moment.  We also discussed smoking cessation as she smokes on rare occasion, but is still smoking.    Holly is going to follow-up with me as helpful, we could consider a left knee  injection at her next appointment if surgery is to be put off further.    It was a pleasure seeing Shahana Harrington DO, CAQSM      This note was constructed using Dragon dictation software, please excuse any minor errors in spelling, grammar, or syntax.    Large Joint Injection/Arthocentesis: R knee joint    Date/Time: 5/4/2023 12:19 PM    Performed by: Santy Harrington DO  Authorized by: Santy Harrington DO    Indications:  Pain  Needle Size:  22 G  Guidance: landmark guided    Approach:  Lateral  Location:  Knee      Aspirate amount (mL):  13  Aspirate:  Blood-tinged and clear  Outcome:  Tolerated well, no immediate complications  Procedure discussed: discussed risks, benefits, and alternatives    Consent Given by:  Patient  Timeout: timeout called immediately prior to procedure    Prep: patient was prepped and draped in usual sterile fashion       Knee Aspiration - Ultrasound Guided  The patient was informed of the risks and the benefits of the procedure and a written consent was signed.  The patient s right knee was prepped with chlorhexidine in sterile fashion.   Procedure was performed using sterile technique.  Local anesthesia was performed using a 27-gauge 1.5-inch needle to administer 3 mL of 1% lidocaine without epi.  Under ultrasound guidance a 1.5-inch 18-gauge needle on a 30 cc syringe was used to enter the superolateral aspect of the knee.  Needle placement was visualized and documented with ultrasound.  Ultrasound visualization was necessary due to ensure proper placement of needle in to the effusion, as well as resolution of effusion once aspiration was completed.  13 cc of blood-tinged joint fluid was aspirated.  Aspiration was performed successfully, without difficulty.  Images were permanently stored for the patient's record.  There were no complications. The patient tolerated the procedure well. There was negligible bleeding.   The patient was instructed to ice the knee upon leaving  clinic and refrain from overuse over the next 3 days.   The patient was instructed to call or go to the emergency room with any unusual pain, swelling, redness, or if otherwise concerned.

## 2023-05-04 NOTE — LETTER
5/4/2023         RE: Holly Muir  [address] [address] [facility] [id number]        Dear Colleague,    Thank you for referring your patient, Holly Muir, to the [facility] [place]. Please see a copy of my visit note below.    HISTORY OF PRESENT ILLNESS  Ms. Muir is a pleasant 68 year old female following up with bilateral knee osteoarthritis.  Holly last saw me for her right knee in March of this year, I injected her right knee with corticosteroid on February 27.  I have also prescribed her narcotics in the meantime for her severe pain.  Since last seeing me she was in the emergency department due to severe knee pain, this was on March 17.  She was discharged to home with pain control and a plan to continue conservative care.  Unfortunately her pain is still severe.  She has pain in her left knee as well that is nearly as severe.  She was also recently seen by Dr. Sheehan, unfortunately she is not a surgical candidate until her A1c significantly decreases.     PHYSICAL EXAM  General  - normal appearance, in no obvious distress  Musculoskeletal - right and left knee  - stance: antalgic gait, in wheelchair  - inspection: 1+ effusion on right  - palpation: medial joint line tenderness bilaterally  - ROM: 120 degrees flexion, 0 degrees extension, painful active ROM bilaterally  - strength: 5/5 in flexion, 5/5 in extension  - special tests:ray  (-) varus at 0 and 30 degrees flexion  (-) valgus at 0 and 30 degrees flexion  Neuro  - no sensory or motor deficit, grossly normal coordination, normal muscle tone       ASSESSMENT & PLAN  Ms. Muir is a 68 year old female following up with bilateral knee osteoarthritis.    I had a long discussion with Holly centering around her symptoms, as well as regarding a plan moving forward.    Given her effusion we did decide to aspirate her right knee today.  It is too soon for corticosteroid, but she can continue to keep taking  her pain meds as helpful.  She is working on her A1c, but is not a surgical candidate at the moment.  We also discussed smoking cessation as she smokes on rare occasion, but is still smoking.    Holly is going to follow-up with me as helpful, we could consider a left knee injection at her next appointment if surgery is to be put off further.    It was a pleasure seeing Holly.        Santy Harrington DO, CAM      This note was constructed using Dragon dictation software, please excuse any minor errors in spelling, grammar, or syntax.    Large Joint Injection/Arthocentesis: R knee joint    Date/Time: 5/4/2023 12:19 PM    Performed by: Santy Harrington DO  Authorized by: Santy Harrington DO    Indications:  Pain  Needle Size:  22 G  Guidance: landmark guided    Approach:  Lateral  Location:  Knee      Aspirate amount (mL):  13  Aspirate:  Blood-tinged and clear  Outcome:  Tolerated well, no immediate complications  Procedure discussed: discussed risks, benefits, and alternatives    Consent Given by:  Patient  Timeout: timeout called immediately prior to procedure    Prep: patient was prepped and draped in usual sterile fashion       Knee Aspiration - Ultrasound Guided  The patient was informed of the risks and the benefits of the procedure and a written consent was signed.  The patient s right knee was prepped with chlorhexidine in sterile fashion.   Procedure was performed using sterile technique.  Local anesthesia was performed using a 27-gauge 1.5-inch needle to administer 3 mL of 1% lidocaine without epi.  Under ultrasound guidance a 1.5-inch 18-gauge needle on a 30 cc syringe was used to enter the superolateral aspect of the knee.  Needle placement was visualized and documented with ultrasound.  Ultrasound visualization was necessary due to ensure proper placement of needle in to the effusion, as well as resolution of effusion once aspiration was completed.  13 cc of blood-tinged joint fluid was aspirated.   Aspiration was performed successfully, without difficulty.  Images were permanently stored for the patient's record.  There were no complications. The patient tolerated the procedure well. There was negligible bleeding.   The patient was instructed to ice the knee upon leaving clinic and refrain from overuse over the next 3 days.   The patient was instructed to call or go to the emergency room with any unusual pain, swelling, redness, or if otherwise concerned.              Again, thank you for allowing me to participate in the care of your patient.        Sincerely,        Santy Harrington DO

## 2023-05-04 NOTE — Clinical Note
You are receiving this message because this member is on the state government program [facility] [employer] [facility] or [facility] [facility].  are required to notify the primary care physician with all emergency room visits. If you have further questions please feel free to contact me.  Malathi Bacon RN N [facility] [phone]

## 2023-05-04 NOTE — PROGRESS NOTES
[facility] Care Coordination Contact  CC received notification of Emergency Room visit.  ER visit occurred on 05/02/23 at [facility]  with Dx of Knee Pain.    CC contacted member and reviewed discharge summary.  Member has a follow-up appointment with PCP: Yes: scheduled on 05/04/23  Member has had a change in condition: No  New referrals placed: No  Home Visit Needed: No  Care plan reviewed and updated.    Member seen in ER for knee pain and suspected DVT. Ultrasound negative for DVT. Member saw her ortho today who aspirated R knee. Post procedure member reports feeling much better. Reports A1C is down from 9.1 to 8.7 but this is still too high to be a candidate for surgery.     PCP notified of ED visit via EMR.    Malathi Bacon RN PHN  [facility]  [phone]

## 2023-05-08 ENCOUNTER — PATIENT OUTREACH (OUTPATIENT)
Dept: GERIATRIC MEDICINE | Facility: CLINIC | Age: 68
End: 2023-05-08
Payer: COMMERCIAL

## 2023-05-08 NOTE — Clinical Note
You are receiving this message because this member is on the state government program [facility] [facility] or [facility] [place] [facility] [facility].  are required to notify the primary care physician with all admissions and discharges from hospitals and nursing homes. If you have further questions please feel free to contact me.  Malathi Bacon RN PHN [facility] [phone]

## 2023-05-08 NOTE — PROGRESS NOTES
TRANSITIONS OF CARE (TASNEEM) LOG   TASNEEM tasks should be completed by the CC within one (1) business day of notification of each transition. Follow up contact with member is required after return to their usual care setting.  Note:  If CC finds out about the transitions fifteen (15) days or more after the member has returned to their usual care setting, no TASNEEM log is needed. However, the CC should check in with the member to discuss the transition process, any changes needed to the care plan and document it in a case note.    Member Name:  Holly Muir [facility] [id number]   Product: [facility] Care Coordinator Contact:  Malathi Bacon RN, PHN Agency/County/Care System: [facility]   Transition Communication Actions from Care Management Contact   Transition #1   Notification Date: 05/08/2023 Transition Date:   05/07/23 Transition From: Home     Is this the member s usual care setting?               yes Transition To: Hospital, [facility]   Transition Type:  Unplanned  Reason for Admission/Comments:  Low blood sugar and chest pain.   Contact member/responsible party to offer assistance with transition Date completed: 05/08/23    Notes from conversation with the member/responsible party, provider, discharging and receiving facility (as applicable):   Date 05/08/23:CC contacted Hospital /discharge planner: not assigned at this time. Care Coordinator spoke with Marisa RIVAS who took Care Coordinator information but was not allowed to give any health related information to Care Coordinator due to HIPPA. Care Coordinator attempted to explain her role and nurse cut her off and stated that someone would be in touch with Care Coordinator.  Care Coordinator called again at the end of the day and still no SW has been assigned. Care Coordinator asked to speak with the charge nurse who was unavailable at the time. Care Coordinator information was taken down and was told the  "charge nurse would call back. Charge nurse called back, reports that FRANK is Brook, Brook was brief on the phone as she stated \"I don't know much about her since the SW consult order at the end of the day.\" Care Coordinator was able to review the services member has in place with CADI waiver.   CC reached out to Agnieszka Taveras GEORGE TOMLIN regarding transition via e-mail, she will notify community service providers and placed services on hold as needed.   Reviewed and update care plan as needed. Transition log initiated.   PCP, Tiffany King, notified of hospitalization via EMR.       Shared CC contact info, care plan/services with receiving setting--Date completed: 05/08/2023   Name & Title of receiving setting contact: Marisa RIVAS at [facility]    Notified PCP of transition--Date completed:  05/08/2023     via  EMR  Name of PCP: Tiffany King     Transition #2   Notification Date: 05/10/23 Transition Date:   05/10/23 Transition From: [facility]     Is this the member s usual care setting?               no Transition To: Home   Transition Type:  Planned  Reason for Admission/Comments:  discontinue home with home care orders  Contact member/responsible party to offer assistance with transition Date completed: 05/10/2023    Notes from conversation with the member/responsible party, provider, discharging and receiving facility (as applicable):   Date 05/10/2023:CC contacted member and left a message requesting a return call.  Member has a follow-up appointment with PCP in 7 days: No: Offered Assistance with setting up a follow up appointment   Member has had a change in condition: No  Home visit needed: No  Care plan reviewed and updated.  The following home based services PCA were resumed.  New referrals placed: Yes: Hospital placed orders with [facility] for PT/OT/SNV  Transition log completed.   PCP, Tiffany King, notified of transition back to home via EMR.       Shared CC contact info, care " plan/services with receiving setting--Date completed: 05/10/2023   Name & Title of receiving setting contact: Laila RIVAS at [facility]   Notified PCP of transition--Date completed:  5/10/23     via  EMR  Name of PCP: Tiffany King      *RETURN TO USUAL CARE SETTING: *Complete tasks below when the member is discharging TO their usual care setting within one (1) business day of notification..      For situations where the Care Coordinator is notified of the discharge prior to the date of discharge, the Care Coordinator must follow up with the member or designated representative to confirm that discharge actually occurred and discuss required TASNEEM tasks as outlined in the TASNEEM Instructions.  (This includes situations where it may be a  new  usual care setting for the member. (i.e., a community member who decides upon permanent nursing home placement following hospitalization and rehab).    Discuss with Member/Responsible Party:    Check  Yes  - if the member, family member and/or SNF/facility staff manages the following:    If  No  provide explanation in the comments section.          Date completed: 05/10/2023 Communicated with member or their designated representative about the following:  care transition process; about changes to the member s health status; plan of care updates; education about transitions and how to prevent unplanned transitions/readmissions    Four Pillars for Optimal Transition:    Check  Yes  - if the member, family member and/or SNF/facility staff manages the following:    If  No  provide explanation in the comments section.          []  Yes     [x]  No Does the member have a follow-up appointment scheduled with primary care or specialist? (Mental health hospitalizations--the appt. should be w/in 7 days)              For mental health hospitalizations:  []  Yes     []  No     Does the member have a follow-up appointment scheduled with a mental health practitioner within 7 days of  discharge?  [x]  Yes     []  No     Has a medication review been completed with member? If no, refer to PCP, home care nurse, MTM, pharmacist  [x]  Yes     []  No     Can the member manage their medications or is there a system in place to manage medications (e.g. home care set-up)?         [x]  Yes     []  No     Can the member verbalize warning signs and symptoms to watch for and how to respond?  [x]  Yes     []  No     Does the member have a copy of and understand their discharge instructions?  If no, assist to obtain copy of discharge instructions, review discharge instructions, and assist to contact PCP to discuss questions about their recent hospitalization.  [x]  Yes     []  No     Does the member have adequate food, housing and transportation?  If no, add goal and discuss additional supports available to the member                                                                                                                                                                                 [x]  Yes     []  No     Is the member safe in their home?  If no, document needs and support provided                                                                                                                                                                          []  Yes     [x]  No     Are there any concerns of vulnerability, abuse, or neglect?  If yes, document concerns and actions taken by Care Coordinator as a mandated                                                                                                                                                                              [x]  Yes     []  No     Does the member use a Personal Health Care Record?  Check  Yes  if visit summary, discharge summary, and/or healthcare summary are being used as a PHR.                                                                                                                                                                                   [x]  Yes     []  No     Have you reviewed the discharge summary with the member? If  No  provide explanation in comments.  [x]  Yes     []  No     Have you updated the member s care plan/support plan? Add new diagnosis, medications, treatments, goals & interventions, as applicable. If No, provide explanation in comments.    Comments:           Notes from conversation with the member/responsible party, provider, discharging and receiving facility (as applicable): CC contacted member and left a message requesting a return call. Spoke with member on 5/11/2023- she is home and doing well. She is requesting shower chair.   Member has a follow-up appointment with PCP in 7 days: No: Offered Assistance with setting up a follow up appointment   Member has had a change in condition: No  Home visit needed: No  Care plan reviewed and updated.  The following home based services PCA were resumed.  New referrals placed: Yes: Home Care with [facility] PT/OT/SNV  Transition log completed.   PCP, Tiffany King, notified of transition back to home via EMR.          Malathi Bacon RN N  [facility]  [phone]

## 2023-05-10 ENCOUNTER — VIRTUAL VISIT (OUTPATIENT)
Dept: NUTRITION | Facility: CLINIC | Age: 68
End: 2023-05-10
Payer: COMMERCIAL

## 2023-05-10 DIAGNOSIS — Z79.4 TYPE 2 DIABETES MELLITUS WITHOUT COMPLICATION, WITH LONG-TERM CURRENT USE OF INSULIN (H): Primary | ICD-10-CM

## 2023-05-10 DIAGNOSIS — E11.9 TYPE 2 DIABETES MELLITUS WITHOUT COMPLICATION, WITH LONG-TERM CURRENT USE OF INSULIN (H): Primary | ICD-10-CM

## 2023-05-10 PROCEDURE — 99207 PR NO CHARGE LOS: CPT | Mod: 93 | Performed by: DIETITIAN, REGISTERED

## 2023-05-10 NOTE — PROGRESS NOTES
Medical Nutrition Therapy Consult Late Cancel     Called patient at time of appointment. She is currently in the hospital and needed to reschedule for Friday 5/12/23 at 2pm.       Serena Caro RD, CLT, LD  Integrative Registered Dietitian

## 2023-05-12 ENCOUNTER — TELEPHONE (OUTPATIENT)
Dept: FAMILY MEDICINE | Facility: CLINIC | Age: 68
End: 2023-05-12

## 2023-05-12 ENCOUNTER — VIRTUAL VISIT (OUTPATIENT)
Dept: NUTRITION | Facility: CLINIC | Age: 68
End: 2023-05-12
Payer: COMMERCIAL

## 2023-05-12 ENCOUNTER — MEDICAL CORRESPONDENCE (OUTPATIENT)
Dept: HEALTH INFORMATION MANAGEMENT | Facility: CLINIC | Age: 68
End: 2023-05-12

## 2023-05-12 ENCOUNTER — PATIENT OUTREACH (OUTPATIENT)
Dept: GERIATRIC MEDICINE | Facility: CLINIC | Age: 68
End: 2023-05-12

## 2023-05-12 DIAGNOSIS — M54.50 CHRONIC BILATERAL LOW BACK PAIN WITHOUT SCIATICA: Primary | ICD-10-CM

## 2023-05-12 DIAGNOSIS — E11.9 TYPE 2 DIABETES MELLITUS WITHOUT COMPLICATION, WITH LONG-TERM CURRENT USE OF INSULIN (H): Primary | ICD-10-CM

## 2023-05-12 DIAGNOSIS — E66.01 MORBID OBESITY (H): ICD-10-CM

## 2023-05-12 DIAGNOSIS — Z79.4 TYPE 2 DIABETES MELLITUS WITHOUT COMPLICATION, WITH LONG-TERM CURRENT USE OF INSULIN (H): Primary | ICD-10-CM

## 2023-05-12 DIAGNOSIS — G89.29 CHRONIC BILATERAL LOW BACK PAIN WITHOUT SCIATICA: Primary | ICD-10-CM

## 2023-05-12 PROCEDURE — 99443 PR MNT INDIVIDUAL F/U REASSESS, EA 15 MIN: CPT | Mod: 95 | Performed by: DIETITIAN, REGISTERED

## 2023-05-12 NOTE — TELEPHONE ENCOUNTER
The Home Care/Assisted Living/Nursing Facility is calling regarding an established patient.  Has the patient seen Home Care in the past or is currently residing in Assisted Living or Nursing Facility? Karen.     Minnie RIVAS Case Manager calling from [facility] requesting the following orders that are NOT within the Home Care, Assisted Living or Nursing Home Eval and Treatment standing order and must be ordered by a Licensed Practitioner.    Preferred Call Back Number: [phone]    Start of care orders:  Skilled Nursing 1x week for 2 weeks, then every other week for 6 weeks  Physical Therapy Evalulation    Routing to Licensed Practitioner (Provider) to review request and provide approval or recommendation.    Writer has verified Requestor will send fax to have orders signed.    REEMA CurtisN, RN, PHN  [facility]

## 2023-05-12 NOTE — PROGRESS NOTES
DME supply(s) have been requested by the patient. DME orders(s) have been queued up and pended for the provider to review. Patient reports the need for DME supplies due severe knee pain. Once completed, please route the encounter to care coordinator to fax completed documentation and order requisition to [facility].     Malathi Bacon RN [facility]  [phone]    5/15/2023: DME order sent to [facility] via e-mail.     Malathi Bacon RN [facility]  [phone]

## 2023-05-12 NOTE — PROGRESS NOTES
Medical Nutrition Therapy  Visit Type: Reassessment and intervention    Visit Details    How would you like to obtain your AVS? MyChart  If the correspondence for visit is dropped, how would you like your dietitian to reconnect with you:   call back by phone? Yes    Text to cell phone: [phone]  Will anyone else be joining your video visit or telephone call? No  {If patient encounters technical issues they should call [phone]    Type of service:  Telephone     Start Time: 2:15pm    End Time: 3:03pm     Originating Location (pt. Location): Home    Distant Location (provider location):  [facility] off-site      Referring Providers:  Tiffany King and Elizabeth Medrano  Internal ([facility]) endocrinology    REASON FOR REFERRAL:   Holly Muir presents today for MNT and education related to type 2 diabetes, weight management.  She is accompanied by self    Changes since previous consult yes        Behavior Status: Improvement Shown Barriers Include: Consistency with eating balanced macros and maintaining balanced blood sugars - has been experiencing lows     Goals: What would you like to work on that will help you most over the next several weeks? Focus on tracking what eating specifically write down carb intake and track a few bloods sugars around food to provide more insight.   She has been going back and forth to the hospital a few times since our last consult so hasn't been able to review the resources sent from our last consult or implement the dietary changes.    She called the ambulance and because of chest pain complaint she had pnemonia and blood clot. She was in the hospital for 4 days.     Her blood glucose is still high at 228 mg/dL as of labs from hospital on 5/10/23. Her hemoglogin has continued to drop at 8.5 g/dL from our last appt. Discussed importance of getting in adequate  Holly says her blood sugars run lower at home.     She has been checking her blood sugars multiple times per  day. Daughter has been helping with some of the meal plans and recipes with Holly. Daughter feels that meal managemet at home has been good. Daughter helps mom with meal planning and recipes. At the hospital her blood sugars were going up and was eating ice cream, fried fish, veggies and juice. Daughter is trying to find balance when Holly is not with her. Since being at home her blood sugars have been good and more consistent. She has some random highs of 190 but mostly has been having more lower range numbers of around . Daughter has been trying to focus on more balanced macros at meal time. She has still been using a plate and bowl to help guide portion sizes. When her sugar does drop, she has been having fruits vs having the high carb sugar. For example fruit snacks have been keeping her in balance at around 120. She only has a few and not whole package. They were doing more nuts and seeds but fell off that over the last week. She usually has this with the oatmeal. Daughter has noticed that the pistachios have helped lower her blood sugars. Now daughter feels aware of what to eat. Now with giving small portions but better variety and combo of right macros she seems to feel full longer. They got hummus with veggies. Daughter has been working hard on ways to get veggies in that are easy to eat.   She was getting [place] vanilla ground coffee and switched to plain which helped her blood go down. Holly tends to get fearful that her blood sugars are going low. She was home alone and got chest pains and dizzy.     Blood sugars: 96 at 6am ate some fruit snacks 125 after two hours before breakfast    Breakfast: moms meals which have been higher in cabs but daughter replaces some of the foods. She does fresh fruit, milk and vegetables to go with the [place] toast  Lunch: Right before lunch the home care nurses came in and blood sugars were at 83   Grapes, sandwich keto bread 2 slices two slices of ham, onion and  tomatoes and light jaquez- , cherry tomatoes with ice water and 1/4 banana sugar went 173 within the hour  Dinner: Chago with veggsharron     Discussed importance of adding in more protein mix of animal and plant based since hemoglobin still low.  She was doing the protein powder and needs to incorporate back into diet again. She has tried greek yogurt and tofu but doesn't like it. Still trying to work to get A1c to 7%.      Notes 4-11-23  Changes since previous consult no       Behavior Status: No improvement Shown Barriers Include:Motivation/Ready to change , Lack of nutrition knowlege and poor memory    Goals: What would you like to work on that will help you most over the next several weeks? Focus on tracking what eating specifically write down carb intake and track a few bloods sugars around food to provide more insight.         She has been making dietary changes and her blood sugars have improved but sometimes still run high depending on how many carbs she eats. She is checking her blood sugars two hours after eating they come on her phone.  She has been having trouble sleeping and waking up in the middle of the night. She has a lot of pain and her blood sugars are often elevated.     She wasn't able to recall everything she ate yesterday due to poor memory.     Checked blood sugars before eating breakfast but forgot and didn't know how to go back and check at the time of our consult but daughter was able to help her blood sugars where running high above 200 before eating.   Breakfast 8-9am - oatmeal with regular cows milk, walnuts, flax seeds, hawa seeds, sunflower seeds, pumpkin seeds with dried apples or strawberries  Total sugar 7g and carbs 29g fiber 6g with 3 boiled eggs daily     She checks her blood sugars 2 hours later and     Encouraged her to write down what she is eating and track how many carbs, sugar and fiber to start     She forgot what she had for lunch     She did have a sub sandwich for dinner  around 6-7pm and stated that her blood sugars went up but unable to identify how high and if it dropped 2 hours later    Once in a while she is doing snacks. If her blood sugars are good she will do a cookie during the day. Discussed the goals we went over last time and getting back on track with the snacks discussed to include some protein and healthy fats and limit the cookies.      INITIAL NUTRITION ASSESSMENT 2/3/23:   Patient comments/concerns relating to nutrition:   One to two times per week has been experiencing low blood sugars less than 60 usually has orange juice to help increase. takes blood sugars when gets up today 105 which has decreased as it was 145-150 a few weeks prior     She struggles with one week BS good and another week high -her average glucose over 14 days from Jan 19th to feb 1 was 226 hard to get to the target range sometimes of . she thinks she may be snacking too much or the wrong thing. She tends to get higher blood sugars per patient around snacks that is when they are the highest       Food/Nutrition History:  Previous diet education: Met with the diabetes educator a couple days ago and went over plate and how much to eat for portions. She has started to cut back on carbs.     Taking Medication:      Current Diabetes Management per Patient:  Taking diabetes medications? Yes     Monitoring  Patient is being treated with:  oral agents and insulin  Patient glucose self monitoring as follows: continuously using a continuous glucose monitor (CGM)  BG meter: Freestyle Mathieu 2-Freestyle Brandi, connected.  meter  BG results: AGP report reviewed                                                                               Eating Patterns & Meal Planning:    Waking up at: 5-7am - Going to bed at: 9pm    Consuming Breakfast: Yes 7 times/week blood sugars: 105 and blood sugars 2 hour after eating 229  Chicken sandwich from [employer] with bun with few fries   Oatmeal with flax and almonds  -she adds apples few or strawberries has almost daily with water or coffee sugar free   Sometimes will have boiled eggs in am depending on where sugars at       Consuming Lunch:  Yes 7 times/week  Depending on blood sugars checks before usually blood sugars around 130-140 tries to recheck every two hours but sometimes a little earlier   [place] 2 slices of bread with tuna (celery, onion and tomato) or turkey and sometimes just has turkey, cheese     Consuming Dinner:  Yes 7 times/week  Usually chicken small portions with vegetable such as carrots, broccoli and cauliflower usually with water sometimes soda diet 12oz but will raise blood sugars only once or twice per month   Has cut back on rice only does twice per month 1/4 cup     Snacks: Yes 7 times/week  Pistachios, popcorn, sweet potato chips - her daughter reads the labels and makes sure they are few. Her daughter puts them in zip lock portion sizes to help control blood sugars   Sometimes has smaller whole banana,3/4 cup blueberries or grapes (not sure how many hasn't been counting) for snack       Skips meals/snacks: No      Beverages: Yes  Pop (Diet) 8 oz 2x/month, Tea  1-2x/month adds splenda, Coffee 1 cup/day or once while 2cups per day and Water 8 8oz cups or more/day    Barriers around meals/snacks include:Blood sugars go higher sometimes and can't have what she wants. She will want to have seconds but family helps watches that she doesn't have too much      Diet is high in: processed carbs especially at snacks   Diet is low in: fat (unsaturated) omega 3 fatty acids, fiber, fruits, vegetables and protein     Grocery Shopping: Yes once twice per month with daughter   Reading Food Labels:  No but daughters do  Barriers Include:back pain hard to cook and walk through grocery store so famiy help    Cooking at home: No daughters and son in law cooks for her - she goes back and forth from their homes     Eats out: Yes   1 meals/per week nadia or aubrie monterroso    Barriers around eating/cooking Include:hard to do salads all the time would like more variety but those foods are high in carbs. Having a hamburger is a treat      Lifestyle Patterns:  Feels stressed with life right now: No      no regular exercise program  Barriers Include:needs physical thearpy for back and knee pain    Struggling with sleep: Yes  Average hours of daily sleep: stays up all throughout the night till next day trouble sleeping only once or twice per month or week. It depends and sometimes blood sugars are high or low at this time. Needs to naps during the day.    When she does sleep it might be during the day and other days she wakes up at 5 or 6am and goes to bed at 10am almost daily     Social History:   Lives with: daughters    Family History   Problem Relation Age of Onset     Diabetes Mother      Cerebrovascular Disease Mother      Diabetes Father      Hypertension Father      Cancer Maternal Grandfather      Cancer Paternal Grandfather      Diabetes Brother      Diabetes Sister      Thyroid Disease Daughter      Thyroid Disease Sister      Multiple Sclerosis Daughter      Glaucoma No family hx of      Macular Degeneration No family hx of           Pertinent Past Medical History:   I have reviewed this patient's medical history and updated it with pertinent information if needed.   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  Past Surgical History:    Procedure Laterality Date     ARTHROSCOPY KNEE RT/LT      LT     BLEPHAROPLASTY BILATERAL Bilateral 8/16/2019    Procedure: BILATERAL UPPER LID BLEPHAROPLASTY;  Surgeon: Randolph Cook MD;  Location: [facility]     BREAST LUMPECTOMY, RT/LT      LT-benign      CATARACT IOL, RT/LT       COLONOSCOPY  5/10/2012    Procedure:COLONOSCOPY; screening colonoscopy; Surgeon:MILLA VEGA; Location:[facility]     COLONOSCOPY WITH CO2 INSUFFLATION N/A 8/2/2019    Procedure: Colonoscopy with CO2 insufflation;  Surgeon: Himanshu Hi MD;  Location: [facility] OR     COLOSTOMY  2000     COLOSTOMY       HYSTERECTOMY, PAP NO LONGER INDICATED      done in [place]-cervical cancer     IMPLANT STIMULATOR SACRAL NERVE STAGE ONE Right 3/10/2015    Procedure: IMPLANT STIMULATOR SACRAL NERVE STAGE ONE;  Surgeon: Teodora Yusuf MD;  [facility]     IMPLANT STIMULATOR SACRAL NERVE STAGE TWO Right 3/31/2015    Procedure: IMPLANT STIMULATOR SACRAL NERVE STAGE TWO;  Surgeon: Teodora Yusuf MD;  [facility]     IMPLANT STIMULATOR SACRAL NERVE STAGE TWO Right 6/22/2020    Procedure: INSERTION, SACRAL NERVE STIMULATOR, STAGE 2 (replacement of interstim battery);  Surgeon: Teodora Yusuf MD;  Location: [facility]     INJECT EPIDURAL TRANSFORAMINAL Right 1/20/2023    Procedure: Right Lumbar 5-Sacral 1 Transforaminal Epidural Steroid Injection with fluoroscopic guidance and contrast;  Surgeon: Kulwinder Booth MD;  Location: [facility]     INJECT EPIDURAL TRANSFORAMINAL Right 3/23/2023    Procedure: Right Lumbar 5-Sacral 1 Transforaminal Epidural Steroid Injection with fluoroscopic guidance and contrast.;  Surgeon: Kulwinder Booth MD;  Location: [facility]     PHACOEMULSIFICATION WITH STANDARD INTRAOCULAR LENS IMPLANT Left 9/26/2019    Procedure: LEFT PHACOEMULSIFICATION, CATARACT, WITH STANDARD IOL INSERTION;  Surgeon: Francesco Winn MD;  Location: [facility]     PHACOEMULSIFICATION WITH STANDARD INTRAOCULAR LENS IMPLANT Right  10/24/2019    Procedure: RIGHT PHACOEMULSIFICATION, CATARACT, WITH STANDARD IOL INSERTION;  Surgeon: Francesco Winn MD;  Location: [facility]     REPAIR PTOSIS Bilateral 08/16/2019     SALPINGO OOPHORECTOMY,R/L/JENNIFER      Salpingo Oophorectomy, RT/LT/JENNIFER     ZZC EXCIS PTERYGIUM  10/08    [facility]     [facility] STOMACH [facility] UNLISTED       [facility]  [facility]    normal per report-no records available-records destroyed       Medications and Supplements:  Current Outpatient Medications   Medication     ARIPiprazole (ABILIFY) 15 MG tablet     blood glucose (ACCU-CHEK CELIA PLUS) test strip     blood glucose (NO BRAND SPECIFIED) test strip     blood glucose (NO BRAND SPECIFIED) test strip     blood glucose monitoring (ACCU-CHEK CELIA PLUS) meter device kit     blood glucose monitoring (NO BRAND SPECIFIED) meter device kit     blood glucose monitoring (SOFTCLIX) lancets     buPROPion (WELLBUTRIN XL) 150 MG 24 hr tablet     busPIRone HCl (BUSPAR) 30 MG tablet     celecoxib (CELEBREX) 100 MG capsule     cetirizine (ZYRTEC) 10 MG tablet     Continuous Blood Gluc  (FREESTYLE CM 2 READER) GALINA     Continuous Blood Gluc Sensor (FREESTYLE CM 14 DAY SENSOR) MISC     Continuous Blood Gluc Sensor (FREESTYLE CM 2 SENSOR) MISC     CVS ACETAMINOPHEN EX  MG tablet     CVS ASPIRIN ADULT LOW DOSE 81 MG chewable tablet     ferrous gluconate (FERGON) 324 (38 Fe) MG tablet     gabapentin (NEURONTIN) 600 MG tablet     glimepiride (AMARYL) 2 MG tablet     HYDROcodone-acetaminophen (NORCO) 5-325 MG tablet     ibuprofen (ADVIL/MOTRIN) 200 MG tablet     insulin glargine (LANTUS SOLOSTAR) 100 UNIT/ML pen     insulin pen needle (31G X 5 MM) 31G X 5 MM miscellaneous     Lancets (ONETOUCH DELICA PLUS RLVEVH20N) MISC     metFORMIN (GLUCOPHAGE XR) 500 MG 24 hr tablet     Multiple Vitamin (DAILY-ISAAC MULTIVITAMIN) TABS     naloxone (NARCAN) 4 MG/0.1ML nasal spray     nystatin (MYCOSTATIN)  422328 UNIT/GM external powder     omeprazole (PRILOSEC) 20 MG DR capsule     order for DME     order for DME     order for DME     order for DME     ORDER FOR DME     ORDER FOR DME     PARoxetine (PAXIL) 20 MG tablet     pioglitazone (ACTOS) 45 MG tablet     propranolol ER (INDERAL LA) 80 MG 24 hr capsule     ramipril (ALTACE) 5 MG capsule     rOPINIRole (REQUIP) 0.25 MG tablet     simvastatin (ZOCOR) 20 MG tablet     traZODone (DESYREL) 50 MG tablet     UNIVERSAL REMOVER WIPES EX MISC     vitamin C (CVS VITAMIN C) 500 MG tablet     VITAMIN D3 25 MCG (1000 UT) tablet     Current Facility-Administered Medications   Medication     lidocaine 1 % injection 2 mL     triamcinolone (KENALOG-40) injection 40 mg     triamcinolone (KENALOG-40) injection 40 mg     triamcinolone (KENALOG-40) injection 40 mg     triamcinolone (KENALOG-40) injection 40 mg       LABS:  Last Basic Metabolic Panel:  Lab Results   Component Value Date     01/19/2023     08/24/2020      Lab Results   Component Value Date    POTASSIUM 4.5 01/19/2023    POTASSIUM 3.8 08/24/2020     Lab Results   Component Value Date    CHLORIDE 99 01/19/2023    CHLORIDE 103 08/24/2020     Lab Results   Component Value Date    NICOLÁS 9.3 01/19/2023    NICOLÁS 8.9 08/24/2020     Lab Results   Component Value Date    CO2 30 01/19/2023    CO2 24 08/24/2020     Lab Results   Component Value Date    BUN 15 01/19/2023    BUN 7 08/24/2020     Lab Results   Component Value Date    CR 0.78 01/19/2023    CR 0.68 08/24/2020     Lab Results   Component Value Date    GLC 78 01/19/2023     08/24/2020       Last Glucose Profile:   Hemoglobin A1C   Date Value Ref Range Status   04/26/2023 8.7 (H) 0.0 - 5.6 % Final     Comment:     Normal <5.7%   Prediabetes 5.7-6.4%    Diabetes 6.5% or higher     Note: Adopted from ADA consensus guidelines.   11/12/2021 8.9 (A) 0.0 - 5.7 % Final       Last Lipid Profile:   Cholesterol   Date Value Ref Range Status   01/19/2023 84 <200 mg/dL  Final   10/08/2021 134 <200 mg/dL Final   06/18/2020 121 <200 mg/dL Final   12/14/2018 118 <200 mg/dL Final     HDL Cholesterol   Date Value Ref Range Status   06/18/2020 78 >49 mg/dL Final   12/14/2018 68 >49 mg/dL Final     Direct Measure HDL   Date Value Ref Range Status   01/19/2023 51 >=50 mg/dL Final   10/08/2021 76 >=50 mg/dL Final     LDL Cholesterol Calculated   Date Value Ref Range Status   01/19/2023 16 <=100 mg/dL Final   10/08/2021 39 <=100 mg/dL Final   06/18/2020 26 <100 mg/dL Final     Comment:     Desirable:       <100 mg/dl   12/14/2018 25 <100 mg/dL Final     Comment:     Desirable:       <100 mg/dl     Triglycerides   Date Value Ref Range Status   01/19/2023 83 <150 mg/dL Final   10/08/2021 96 <150 mg/dL Final   06/18/2020 87 <150 mg/dL Final   12/14/2018 124 <150 mg/dL Final     Comment:     Fasting specimen     Cholesterol/HDL Ratio   Date Value Ref Range Status   09/18/2015 2.0 0.0 - 5.0 Final   07/29/2014 2.7 0.0 - 5.0 Final       Most recent CBC:  Recent Labs   Lab Test 01/19/23  1115 04/22/22  1339   WBC 7.7 7.4   HGB 10.0* 11.3*   HCT 31.7* 34.9*    334     Most recent hepatic panel:  Recent Labs   Lab Test 01/19/23  1115 10/08/21  0922   ALT 20 35   AST 10 22     Most recent creatinine:  Recent Labs   Lab Test 01/19/23  1115 04/22/22  1339   CR 0.78 0.80       Last Thyroid Profile:   TSH   Date Value Ref Range Status   08/24/2020 1.44 0.40 - 4.00 mU/L Final       Last Mineral Profile:   Ferritin   Date Value Ref Range Status   10/14/2020 40 8 - 252 ng/mL Final     Iron   Date Value Ref Range Status   10/14/2020 43 35 - 180 ug/dL Final     Iron Binding Cap   Date Value Ref Range Status   10/14/2020 434 (H) 240 - 430 ug/dL Final         Last Vitamin Profile:   25 OH Vit D2   Date Value Ref Range Status   02/03/2015 <5 ug/L Final     25 OH Vit D3   Date Value Ref Range Status   02/03/2015 49 ug/L Final     25 OH Vit D total   Date Value Ref Range Status   02/03/2015  30 - 75 ug/L  "Final    <54  Season, race, dietary intake, and treatment affect the concentration of   25-hydroxy-Vitamin D. Values may decrease during winter months and increase   during summer months. Values less than 30 ug/L may indicate Vitamin D   deficiency.   This test was developed and its performance characteristics determined by the   [facility],  Special Chemistry Laboratory. It has   not been cleared or approved by the FDA. The laboratory is regulated under CLIA   as qualified to perform high-complexity testing. This test is used for clinical   purposes. It should not be regarded as investigational or for research.         ANTHROPOMETRICS:  Vitals:   BP Readings from Last 1 Encounters:   03/31/23 125/77     Pulse Readings from Last 1 Encounters:   03/31/23 66     Estimated body mass index is 36.58 kg/m  as calculated from the following:    Height as of 1/2/23: 1.575 m (5' 2\").    Weight as of 3/31/23: 90.7 kg (200 lb).    Wt Readings from Last 5 Encounters:   03/31/23 90.7 kg (200 lb)   01/20/23 90.7 kg (200 lb)   01/02/23 90.7 kg (200 lb)   11/19/22 90.7 kg (200 lb)   09/14/22 93 kg (205 lb)       NUTRITION DIAGNOSIS:   1. Altered nutrition-related laboratory values related to endocrine dysfunction as evidenced by elevated BMI 36, HbA1c 9.1%  and average glucose per [facility] report Jan 14- Feb 1st 226. Low HGB 10      NUTRITION INTERVENTION:   Nutrition Education (Application):  Cardiometabolic Food Plan Materials by [employer]     Discussed healthy eating patters, portion sizes for healthy fats and proteins and carbs    Reviewed and planned out some recipes for breakfast and snacks that included protein and healthy fats     Long Term Goals:   Goal: Decrease fasting blood sugars to 70-99 mg/dL within 1-6 months  Goal: Decrease Hemoglobin A1c <7 % within 2-6 months   Goal: Lose 20-24lbs in 6 months, recommend average 1-2lbs per week of weight loss.   Goal: Increase " protein intake to help normalize HGB levels to 11.7-15.7 g/dL         Short Term Goals:  Goal 1: Improvement Shown: Focus on having a healthy balanced breakfast daily for the next six weeks. - focus on lean proteins 2 serving 12g protein  Incorporate a whole grain at breakfast ex continue oatmeal naturally gluten free with more healthy fats such as handful of walnuts and 1 fruit serving 15g of carbs such as blueberries. Focus on adding in a lean protein as side such as 2 eggs, 2 turkey or chicken sausage.    Try a smoothie 1-2x a week for breakfast for the next six weeks see recipe discussed. add in unsweetened flax, hemp or coconut milk, 1 tbsp of healthy fat such as flax seed ground or hawa seed ground. serving of fruit 1 cup of berries. Veggie (optional), 1 scoop of plant based protein powder or collagen powder, 1 serving of fruit and veggie such as kale, spinach, or veggie powder even spirulina     Avocado on slice of gluten free bread recommend base culture brand      [email or web address]/?[email or web address]     Birch mobley brand for pancakes - paleo  https://Le[employer] Commerce/collections/pancake-waffle-mix/products/paleo     Try siete soft taco shells with eggs and veggies in am for breakfast   https://PneumRx/collections/tortillas/products/jngqfx-msusi-ddralofgw-6-pack    Goal 2: Focus on having adequate protein at least 60-70 grams of protein per day with mix of animal and plant based options - see portion guide provided     Incorporate protein powder daily with smoothie:    Plant based hemp (recommended brands: [place], Nutiva, Just Hemp Protein, [employer] [place])      Plant based pea (recommended brands: Naked Pea, Now Sports). If you want to try a combo of pea and hemp the brand [place] in vanilla or chocolate is a great option.     or collagen peptides -collagen can be used for smoothies, oatmeal and soups. This  can help provide essential amino acids and minerals that heal your gut as well as balance blood sugars. A great option if you have a hard time tolerating solids.     Can also consider pre made shakes if unable to make smoothies.      Brand examples that are gluten and dairy free to try:   1) Orgain Vegan Protein Shakes, 20g of Plant Based Protein, Creamy Chocolate - Gluten Free, No Dairy, Soy, or Preservatives, No Added Sugar  2) Pirq, Vegan Protein Shake, Turmeric Curcumin, Perri, Plant-Based Protein Drink, Gluten-Free, Dairy-Free, Soy-Free, Non-GMO, Vegetarian, Kosher, Keto, Low Carb, Low Calorie  3) OWYN Pro Elite Vegan Plant-Based High Protein Shake, 35g Protein, 9 Amino Acids, Omega-3, Prebiotics, Superfoods Greens for Workout and Recovery, 0g Net Carbs, Zero Sugar, Keto  4) Ripple Vegan Protein Shake  Chocolate  20g Nutritious Plant Based Pea Protein  Shelf Stable  No GMOs, Soy, Nut, Gluten, Lactose   5) Makstr Glucose Support 1.2 Plant based, dairy free, low glycemic index  https://Wist[employer]/products/glucose-support-1-2-vanilla     Goal 3:  Improvement Shown: Try to have 1 snack daily between meals at 3pm and 1-2 hours before bed depending on blood sugars that includes 1 fruit serving 15g of carbs and healthy fat 5-15 grams and or lean protein 7-12g for the next eight weeks.   - see meal plan and recipe ideas in the cardiometabolic guides provided.         2 hard boiled eggs.     Greek Yogurt with Blackberries    Fresh Yellow Pear or veggie (radishes, bell peppers, baby cucumbers, carrots or sugar snap peas) with Hummus - try the mini to go packs to help with portion control     Marinated Olives* and Kefir     Purple Plum with Mixed Nuts    Celery with [place] Butter    Dark Chocolate, 70% or higher Cocoa and Pistachio Nuts    Banana with almond butter or sunflower seed butter     Apple with peanut butter topped with cinnamon     Baby cucumber or carrots, bell pepper, radishes or sugar snap  peas with individual servings of guac     Low sodium cottage with peaches or mandarin oranges     Sunflower seeds with strawberries     Turkey jerky or 100% grass fed beef jerky     Plant based protein bar     Simple Mills Carmichaels Flour Crackers or Libby Gone Cracker dip with hummus or guac     Avocado on slice of gluten free bread recommend base culture brand or 100% whole grain bread       Goal 4: Improvement Shown: Write down what your eating, times, blood sugars just maybe hour hours after eating and when wake up in am. Track how many carbs, sugar and fiber having at first than worry about protein and fat later.     Suggested to track in myfitnesspal for a week or so to see where overall calories are at and if getting enough.     Mediterranean Approach: Eat whole, unprocessed real foods in their unprocessed forms such as fruits, vegetables, whole grains (prefer gluten free), nuts, legumes, extra virgin olive oil, spices, modest amounts of poultry, and fish.        Cardiometabolic Food Plan: 2394-4363 calories per day     Protein 6-7 servings per day  - include at each meal to stabilize blood sugars   (Choose 3oz or 21g per meal and aim for 1oz of 7g for snacks)   - Strive for 1-2 servings of fish per week especially of higher omega-3 fatty acid containing fish such as salmon.     Legumes 1-2 serving per day     Dairy alternatives 2-3 serving per day     Nuts and seeds 2-3 servings per day - great to incorporate as snacks     Fats and oils 3-4 servings per day     Non starchy vegetables 8-10 servings per day     Starchy vegetable limit 1 serving per day as they tend to impact blood sugar (they are moderate-GI).     Fruits 2 servings per day - best to couple with a little bit of protein or fat to offset a rise in blood sugars (they are low-moderate-GI foods).     Whole grains 2 serving per day - try gluten free whole grains for more variety    Choose Low Glycemic (GI) foods: Regulate your sugar levels by eating  foods that do not spike blood sugars.  Eat low -GI foods so only small fluctuations in blood glucose and insulin levels are produced.      Examples of low-GI foods: nuts, seeds, GF oats, most vegetables especially non-starchy and fruits.     Medium or high-GI foods should be eaten with a protein or fat, both of which blunt the glycemic effect of these foods. This reduces the overall glycemic impact of a meal.   Ex: Most grains and starchy veggies are medium/high GI.       Avoid foods containing refined sugars, artificial sweeteners, and refined grains they are considered high-GI because they lead to sharp increases in blood sugars levels, which increase insulin sensitivity causing increased TG, and low good cholesterol (HDL).   Ex: cakes, cookies, pies, bread, sodas, fruit drinks, presweetened tea, coffee drinks, energy or sport drinks, flavored milk and other processed foods.     Choose foods high in fiber: Aim for at least 5 grams of fiber per serving of food or a total of 25-35 grams fiber per day. Remember, when looking at the label, you can take the fiber away from the total carbs. Ex:15g of total carbs - 4g of fiber = 11g net carbs     Insoluble fiber acts like a bulky  inner broom,  sweeping out debris from the intestine and creating more motility and movement.      Soluble fiber attracts water and swells, creating a gel that slows digestion.  Also, slows the release of glucose from foods into the blood which stops spikes in blood sugar levels.  Soluble fiber traps toxins in the gut, helping to carry them to excretion and provides healthy bacteria in the digestive tract.     Choose High Quality Fats: Adding anti-inflammatory fats into your diet such as fish (salmon, herring, mackerel, and sardines), omega 3 eggs, hawa seeds, ground flax seeds/milk, hemp seeds/milk, walnuts and some other certain leafy greens will increase omega-3 fats to omeaga-6 fats ratio.     Therapeutic fats both monounsaturated and  polyunsaturated to include daily: ground flaxseeds, unsalted mixed nuts, avocados, olives, extra-virgin olive oil.     Emphasize high-quality oils and fats in the diet daily such as avocado oil, coconut oil, flaxseed, olive, sesame. Ex: 1 tsp to 1 tbsp of MCT oil from coconuts can be added into coffee, smoothies, and salad dressings per day.     Avoid trans fats found in processed foods       NUTRITION RESOURCES:  1. IFM Cardiometabolic Packet 1400    Functional Nutrition Fundamentals     Comprehensive Guide    Meal plan with recipes       PATIENT'S BEHAVIOR CHANGE GOALS:   See nutrition intervention for patient stated behavior change goals.     MONITOR / EVALUATE:  Registered Dietitian will monitor/evaluate the following:     Beliefs and attitudes related to food    Food and nutrition knowledge / skills    Food / Beverage / Nutrient intake     Pertinent Labs    Progress toward meeting stated nutrition-related goals    Readiness to change nutrition-related behaviors    Weight change    Digestion     COORDINATION OF CARE:  Follow up with endocrinology and primary care provider       FOLLOW-UP:  Follow-up appointment scheduled on July 19th at 10am video visit       Time spent in minutes: 45 minutes 3 units   Encounter: Individual    Serena Caro RD, CLT, LD  Integrative Registered Dietitian

## 2023-05-14 ENCOUNTER — TELEPHONE (OUTPATIENT)
Dept: FAMILY MEDICINE | Facility: CLINIC | Age: 68
End: 2023-05-14
Payer: COMMERCIAL

## 2023-05-14 NOTE — TELEPHONE ENCOUNTER
Medication Question or Refill        What medication are you calling about (include dose and sig)?: Oxycodone, Eliquis    Preferred Pharmacy:   [facility]/pharmacy #[id number] - [place] - [address]  [address]  [place] [address]  Phone: [phone] Fax: [phone]        Controlled Substance Agreement on file:   CSA -- Patient Level:     [Media Unavailable] Controlled Substance Agreement - Opioid - Scan on 4/27/2023  2:59 PM   [Media Unavailable] Controlled Substance Agreement - Opioid - Scan on 4/15/2021  9:53 AM       Who prescribed the medication?: n/a    Do you need a refill? Yes    When did you use the medication last? n/a    Patient offered an appointment? No    Do you have any questions or concerns?  Yes: Patient had to cancelled appointment with Tiffany King because she has another doctor's appointment that same day and time and she is requesting a refill for her Oxycodone that she was prescribed in the hospital and she is also requesting Eliquis. Patient would like for the provider to give her a call after 12 pm on 05/15 to talk.      Could we send this information to you in [place] or would you prefer to receive a phone call?:   Patient would prefer a phone call   Okay to leave a detailed message?: Yes at Home number on file [phone] (home)

## 2023-05-15 ENCOUNTER — MYC REFILL (OUTPATIENT)
Dept: FAMILY MEDICINE | Facility: CLINIC | Age: 68
End: 2023-05-15
Payer: COMMERCIAL

## 2023-05-15 ENCOUNTER — TELEPHONE (OUTPATIENT)
Dept: FAMILY MEDICINE | Facility: CLINIC | Age: 68
End: 2023-05-15
Payer: COMMERCIAL

## 2023-05-15 DIAGNOSIS — M17.11 PRIMARY OSTEOARTHRITIS OF RIGHT KNEE: ICD-10-CM

## 2023-05-15 DIAGNOSIS — F11.90 OPIOID USE DISORDER: ICD-10-CM

## 2023-05-15 DIAGNOSIS — G89.29 CHRONIC PAIN OF RIGHT KNEE: ICD-10-CM

## 2023-05-15 DIAGNOSIS — M25.561 CHRONIC PAIN OF RIGHT KNEE: ICD-10-CM

## 2023-05-15 RX ORDER — HYDROCODONE BITARTRATE AND ACETAMINOPHEN 5; 325 MG/1; MG/1
1 TABLET ORAL 3 TIMES DAILY PRN
Qty: 42 TABLET | Refills: 0 | Status: CANCELLED | OUTPATIENT
Start: 2023-05-15

## 2023-05-15 NOTE — TELEPHONE ENCOUNTER
RN called [facility] and left detailed message of provider approval of home care orders below. Left clinic number [phone] for further questions or concerns.    EVELYN Rajput  [facility] Primary Care Triage

## 2023-05-15 NOTE — TELEPHONE ENCOUNTER
Forms/Letter Request    Type of form/letter: [facility]     Have you been seen for this request: N/A    Do we have the form/letter: Yes: Will place forms on providers desk for review/signature    When is form/letter needed by: asap    How would you like the form/letter returned: Fax : [phone]

## 2023-05-16 NOTE — PATIENT INSTRUCTIONS
NUTRITION INTERVENTION:   Nutrition Education (Application):  Cardiometabolic Food Plan Materials by [employer]     Discussed healthy eating patters, portion sizes for healthy fats and proteins and carbs    Reviewed and planned out some recipes for breakfast and snacks that included protein and healthy fats     Long Term Goals:   Goal: Decrease fasting blood sugars to 70-99 mg/dL within 1-6 months  Goal: Decrease Hemoglobin A1c <7 % within 2-6 months   Goal: Lose 20-24lbs in 6 months, recommend average 1-2lbs per week of weight loss.   Goal: Increase protein intake to help normalize HGB levels to 11.7-15.7 g/dL         Short Term Goals:  Goal 1: Improvement Shown: Focus on having a healthy balanced breakfast daily for the next six weeks. - focus on lean proteins 2 serving 12g protein  Incorporate a whole grain at breakfast ex continue oatmeal naturally gluten free with more healthy fats such as handful of walnuts and 1 fruit serving 15g of carbs such as blueberries. Focus on adding in a lean protein as side such as 2 eggs, 2 turkey or chicken sausage.    Try a smoothie 1-2x a week for breakfast for the next six weeks see recipe discussed. add in unsweetened flax, hemp or coconut milk, 1 tbsp of healthy fat such as flax seed ground or hawa seed ground. serving of fruit 1 cup of berries. Veggie (optional), 1 scoop of plant based protein powder or collagen powder, 1 serving of fruit and veggie such as kale, spinach, or veggie powder even spirulina     Avocado on slice of gluten free bread recommend base culture brand      https://[employer].SunModular/?[email or web address]     Birch mobley brand for pancakes - paleo  https://GoWar/collections/pancake-waffle-mix/products/paleo     Try siete soft taco shells with eggs and veggies in am for breakfast    https://USERJOY Technology.BarEye/collections/tortillas/products/ogfdym-beebm-pdbfnkcpx-6-pack    Goal 2: Focus on having adequate protein at least 60-70 grams of protein per day with mix of animal and plant based options - see portion guide provided     Incorporate protein powder daily with smoothie:  Plant based hemp (recommended brands: [place], Nutiva, Just Hemp Protein, PoachIt Red Mill)    Plant based pea (recommended brands: Naked Pea, Now Sports). If you want to try a combo of pea and hemp the brand Dawson in vanilla or chocolate is a great option.   or collagen peptides -collagen can be used for smoothies, oatmeal and soups. This can help provide essential amino acids and minerals that heal your gut as well as balance blood sugars. A great option if you have a hard time tolerating solids.     Can also consider pre made shakes if unable to make smoothies.      Brand examples that are gluten and dairy free to try:   1) Orgain Vegan Protein Shakes, 20g of Plant Based Protein, Creamy Chocolate - Gluten Free, No Dairy, Soy, or Preservatives, No Added Sugar  2) Pirq, Vegan Protein Shake, Turmeric Curcumin, Perri, Plant-Based Protein Drink, Gluten-Free, Dairy-Free, Soy-Free, Non-GMO, Vegetarian, Kosher, Keto, Low Carb, Low Calorie  3) OWYN Pro Elite Vegan Plant-Based High Protein Shake, 35g Protein, 9 Amino Acids, Omega-3, Prebiotics, Superfoods Greens for Workout and Recovery, 0g Net Carbs, Zero Sugar, Keto  4) Ripple Vegan Protein Shake  Chocolate  20g Nutritious Plant Based Pea Protein  Shelf Stable  No GMOs, Soy, Nut, Gluten, Lactose   5) [employer] Glucose Support 1.2 Plant based, dairy free, low glycemic index  https://Coupsta.Benjamin's Desk/products/glucose-support-1-2-vanilla     Goal 3:  Improvement Shown: Try to have 1 snack daily between meals at 3pm and 1-2 hours before bed depending on blood sugars that includes 1 fruit serving 15g of carbs and healthy fat 5-15 grams and or lean protein 7-12g for the next  eight weeks.   - see meal plan and recipe ideas in the cardiometabolic guides provided.       2 hard boiled eggs.   Greek Yogurt with Blackberries  Fresh Yellow Pear or veggie (radishes, bell peppers, baby cucumbers, carrots or sugar snap peas) with Hummus - try the mini to go packs to help with portion control   Marinated Olives* and Kefir   Purple Plum with Mixed Nuts  Celery with [place] Butter  Dark Chocolate, 70% or higher Cocoa and Pistachio Nuts  Banana with almond butter or sunflower seed butter   Apple with peanut butter topped with cinnamon   Baby cucumber or carrots, bell pepper, radishes or sugar snap peas with individual servings of guac   Low sodium cottage with peaches or mandarin oranges   Sunflower seeds with strawberries   Turkey jerky or 100% grass fed beef jerky   Plant based protein bar   Simple Mills [place] Flour Crackers or Libby Gone Cracker dip with hummus or guac   Avocado on slice of gluten free bread recommend base culture brand or 100% whole grain bread       Goal 4: Improvement Shown: Write down what your eating, times, blood sugars just maybe hour hours after eating and when wake up in am. Track how many carbs, sugar and fiber having at first than worry about protein and fat later.     Suggested to track in myfitnesspal for a week or so to see where overall calories are at and if getting enough.     Mediterranean Approach: Eat whole, unprocessed real foods in their unprocessed forms such as fruits, vegetables, whole grains (prefer gluten free), nuts, legumes, extra virgin olive oil, spices, modest amounts of poultry, and fish.        Cardiometabolic Food Plan: 8750-5957 calories per day   Protein 6-7 servings per day  - include at each meal to stabilize blood sugars   (Choose 3oz or 21g per meal and aim for 1oz of 7g for snacks)   Strive for 1-2 servings of fish per week especially of higher omega-3 fatty acid containing fish such as salmon.   Legumes 1-2 serving per day   Dairy  alternatives 2-3 serving per day   Nuts and seeds 2-3 servings per day - great to incorporate as snacks   Fats and oils 3-4 servings per day   Non starchy vegetables 8-10 servings per day   Starchy vegetable limit 1 serving per day as they tend to impact blood sugar (they are moderate-GI).   Fruits 2 servings per day - best to couple with a little bit of protein or fat to offset a rise in blood sugars (they are low-moderate-GI foods).   Whole grains 2 serving per day - try gluten free whole grains for more variety    Choose Low Glycemic (GI) foods: Regulate your sugar levels by eating foods that do not spike blood sugars.  Eat low -GI foods so only small fluctuations in blood glucose and insulin levels are produced.      Examples of low-GI foods: nuts, seeds, GF oats, most vegetables especially non-starchy and fruits.     Medium or high-GI foods should be eaten with a protein or fat, both of which blunt the glycemic effect of these foods. This reduces the overall glycemic impact of a meal.   Ex: Most grains and starchy veggies are medium/high GI.       Avoid foods containing refined sugars, artificial sweeteners, and refined grains they are considered high-GI because they lead to sharp increases in blood sugars levels, which increase insulin sensitivity causing increased TG, and low good cholesterol (HDL).   Ex: cakes, cookies, pies, bread, sodas, fruit drinks, presweetened tea, coffee drinks, energy or sport drinks, flavored milk and other processed foods.     Choose foods high in fiber: Aim for at least 5 grams of fiber per serving of food or a total of 25-35 grams fiber per day. Remember, when looking at the label, you can take the fiber away from the total carbs. Ex:15g of total carbs - 4g of fiber = 11g net carbs     Insoluble fiber acts like a bulky  inner broom,  sweeping out debris from the intestine and creating more motility and movement.      Soluble fiber attracts water and swells, creating a gel that  slows digestion.  Also, slows the release of glucose from foods into the blood which stops spikes in blood sugar levels.  Soluble fiber traps toxins in the gut, helping to carry them to excretion and provides healthy bacteria in the digestive tract.     Choose High Quality Fats: Adding anti-inflammatory fats into your diet such as fish (salmon, herring, mackerel, and sardines), omega 3 eggs, hawa seeds, ground flax seeds/milk, hemp seeds/milk, walnuts and some other certain leafy greens will increase omega-3 fats to omeaga-6 fats ratio.     Therapeutic fats both monounsaturated and polyunsaturated to include daily: ground flaxseeds, unsalted mixed nuts, avocados, olives, extra-virgin olive oil.     Emphasize high-quality oils and fats in the diet daily such as avocado oil, coconut oil, flaxseed, olive, sesame. Ex: 1 tsp to 1 tbsp of MCT oil from coconuts can be added into coffee, smoothies, and salad dressings per day.   Avoid trans fats found in processed foods       NUTRITION RESOURCES:  IFM Cardiometabolic Packet 1400  Functional Nutrition Fundamentals   Comprehensive Guide  Meal plan with recipes

## 2023-05-17 ENCOUNTER — TELEPHONE (OUTPATIENT)
Dept: FAMILY MEDICINE | Facility: CLINIC | Age: 68
End: 2023-05-17
Payer: COMMERCIAL

## 2023-05-17 RX ORDER — OXYCODONE HYDROCHLORIDE 5 MG/1
5 TABLET ORAL EVERY 6 HOURS PRN
Qty: 12 TABLET | Refills: 0 | Status: SHIPPED | OUTPATIENT
Start: 2023-05-17 | End: 2023-05-23

## 2023-05-17 NOTE — TELEPHONE ENCOUNTER
Patient is requesting a refill of her Tizanidine 2 mg -1 tablet by mouth every 5 hours for muscle spasm. She reports that she was given this because she was in [facility] for blood clot in her lung, leg spasms and pneumonia on 5/7/2023. She has not had a hospital follow up yet as she could not make the appointment that she had scheduled.     Nursing support:  Patient was assisted in making an appointment as listed below. She was advised that she can address any needed refills at that time.  Patient verbalized good understanding, agrees with plan and needs no further support.  Thank you. India Patel R.N.

## 2023-05-18 ENCOUNTER — VIRTUAL VISIT (OUTPATIENT)
Dept: FAMILY MEDICINE | Facility: CLINIC | Age: 68
End: 2023-05-18
Payer: COMMERCIAL

## 2023-05-18 DIAGNOSIS — F11.90 OPIOID USE DISORDER: ICD-10-CM

## 2023-05-18 DIAGNOSIS — Z09 HOSPITAL DISCHARGE FOLLOW-UP: Primary | ICD-10-CM

## 2023-05-18 DIAGNOSIS — G89.29 CHRONIC PAIN OF RIGHT KNEE: ICD-10-CM

## 2023-05-18 DIAGNOSIS — E66.01 MORBID OBESITY (H): ICD-10-CM

## 2023-05-18 DIAGNOSIS — I26.99 OTHER ACUTE PULMONARY EMBOLISM WITHOUT ACUTE COR PULMONALE (H): ICD-10-CM

## 2023-05-18 DIAGNOSIS — M17.11 PRIMARY OSTEOARTHRITIS OF RIGHT KNEE: ICD-10-CM

## 2023-05-18 DIAGNOSIS — M25.561 CHRONIC PAIN OF RIGHT KNEE: ICD-10-CM

## 2023-05-18 PROCEDURE — 99443 PR PHYSICIAN TELEPHONE EVALUATION 21-30 MIN: CPT | Mod: 95 | Performed by: INTERNAL MEDICINE

## 2023-05-18 RX ORDER — OXYCODONE HYDROCHLORIDE 5 MG/1
5 TABLET ORAL EVERY 6 HOURS PRN
Qty: 60 TABLET | Refills: 0 | Status: CANCELLED | OUTPATIENT
Start: 2023-05-20 | End: 2023-06-19

## 2023-05-18 ASSESSMENT — ANXIETY QUESTIONNAIRES
GAD7 TOTAL SCORE: 2
8. IF YOU CHECKED OFF ANY PROBLEMS, HOW DIFFICULT HAVE THESE MADE IT FOR YOU TO DO YOUR WORK, TAKE CARE OF THINGS AT HOME, OR GET ALONG WITH OTHER PEOPLE?: SOMEWHAT DIFFICULT
1. FEELING NERVOUS, ANXIOUS, OR ON EDGE: NOT AT ALL
GAD7 TOTAL SCORE: 2
3. WORRYING TOO MUCH ABOUT DIFFERENT THINGS: NOT AT ALL
4. TROUBLE RELAXING: SEVERAL DAYS
5. BEING SO RESTLESS THAT IT IS HARD TO SIT STILL: SEVERAL DAYS
2. NOT BEING ABLE TO STOP OR CONTROL WORRYING: NOT AT ALL
7. FEELING AFRAID AS IF SOMETHING AWFUL MIGHT HAPPEN: NOT AT ALL
GAD7 TOTAL SCORE: 2
IF YOU CHECKED OFF ANY PROBLEMS ON THIS QUESTIONNAIRE, HOW DIFFICULT HAVE THESE PROBLEMS MADE IT FOR YOU TO DO YOUR WORK, TAKE CARE OF THINGS AT HOME, OR GET ALONG WITH OTHER PEOPLE: SOMEWHAT DIFFICULT
6. BECOMING EASILY ANNOYED OR IRRITABLE: NOT AT ALL
7. FEELING AFRAID AS IF SOMETHING AWFUL MIGHT HAPPEN: NOT AT ALL

## 2023-05-18 NOTE — PROGRESS NOTES
"Holly is a 68 year old who is being evaluated via a billable video visit.      How would you like to obtain your AVS? MyChart  If the video visit is dropped, the invitation should be resent by: Text to cell phone: [phone]  Will anyone else be joining your video visit? No    Visit changed to telephone visit.       Assessment & Plan     Holly was seen today for hospital f/u.    Diagnoses and all orders for this visit:    Hospital discharge follow-up    Other acute pulmonary embolism without acute cor pulmonale (H)  Comments:  Xeralto for 3 months for now. may need longer term if she  continues to be sedentary, unable to get knee surgery.     Morbid obesity (H)  Comments:  limited ability to exercise    Primary osteoarthritis of right knee    Chronic pain of right knee    Opioid use disorder  Comments:  okay to use oxycodone 5 mg 4 times a day short term pening on ortho evaluation/surgery.     Other orders  -     REVIEW OF HEALTH MAINTENANCE PROTOCOL ORDERS         Review of external notes as documented elsewhere in note  I spent a total of 48 minutes on the day of the visit.   Time spent by me doing chart review, history and exam, documentation and further activities per the note     MED REC REQUIRED  Post Medication Reconciliation Status: discharge medications reconciled, continue medications without change  BMI:   Estimated body mass index is 36.58 kg/m  as calculated from the following:    Height as of 1/2/23: 1.575 m (5' 2\").    Weight as of 3/31/23: 90.7 kg (200 lb).   Weight management plan: not addressed.    Follow up in 3-4 weeks.      Tiffany King MD PhD  [facility]   Holly is a 68 year old, presenting for the following health issues:  Hospital F/U        5/18/2023    10:19 AM   Additional Questions   Roomed by Jordyn Doyle presented to ER due to hypoglycemia. She was home by herself at the time. She had a pain under her chest. She was taken to ER. Found to have PE in " the left lung. Ruled out cardiac issue. She also had severe right knee pain, unable to bear weight. Pt was put on oxycodone and muscle relaxer for leg spasm.     She is now taking oxycodone 5 mg every 6 hours.   She is waiting for right knee TKA until A1c improves. Last A1c was 8.7.   Has sports medicine provider for injection therapy.   She sees endocrine for diabetes.         Hospital Follow-up Visit:    Hospital/Nursing Home/IP Rehab Facility: [facility]  Date of Admission: 5/7/23  Date of Discharge: 5/10/23  Reason(s) for Admission: Sugar went down, SOB    Was your hospitalization related to COVID-19? No   Problems taking medications regularly:  None  Medication changes since discharge: apixaban 5 mg tablet, oxyCODONE 5 mg immediate release tablet, tiZANidine 2 mg tablet  Problems adhering to non-medication therapy:  None    Summary of hospitalization:  [facility] discharge summary reviewed  Diagnostic Tests/Treatments reviewed.  Follow up needed: none  Other Healthcare Providers Involved in Patient s Care:         Specialist appointment - sports medicine and orthopedics  Update since discharge: improved.     Plan of care communicated with patient     Pt presented to OSH for low glucose but found to have PE. Put on Xeralto. She also has chronic severe knee pain, seeing orthopedics. At the hospital, she was given oxycodone 5 mg every 6 hours and tizanidine 2 mg 3-4 times a day.       Review of Systems   Constitutional, HEENT, cardiovascular, pulmonary, gi and gu systems are negative, except as otherwise noted.      Objective           Vitals:  No vitals were obtained today due to virtual visit.    Physical Exam   GENERAL: Healthy, alert and no distress    Phone time: 27 minutes.

## 2023-05-19 ENCOUNTER — TELEPHONE (OUTPATIENT)
Dept: FAMILY MEDICINE | Facility: CLINIC | Age: 68
End: 2023-05-19

## 2023-05-19 ENCOUNTER — VIRTUAL VISIT (OUTPATIENT)
Dept: EDUCATION SERVICES | Facility: CLINIC | Age: 68
End: 2023-05-19
Payer: COMMERCIAL

## 2023-05-19 DIAGNOSIS — Z79.4 TYPE 2 DIABETES MELLITUS WITH HYPERGLYCEMIA, WITH LONG-TERM CURRENT USE OF INSULIN (H): Primary | ICD-10-CM

## 2023-05-19 DIAGNOSIS — E11.65 TYPE 2 DIABETES MELLITUS WITH HYPERGLYCEMIA, WITH LONG-TERM CURRENT USE OF INSULIN (H): Primary | ICD-10-CM

## 2023-05-19 PROCEDURE — 98966 PH1 ASSMT&MGMT NQHP 5-10: CPT | Mod: 93

## 2023-05-19 RX ORDER — OXYCODONE HYDROCHLORIDE 5 MG/1
5 TABLET ORAL EVERY 6 HOURS PRN
Qty: 12 TABLET | Refills: 0 | Status: CANCELLED | OUTPATIENT
Start: 2023-05-19

## 2023-05-19 RX ORDER — TIZANIDINE 2 MG/1
2 TABLET ORAL
COMMUNITY
Start: 2023-05-10 | End: 2023-05-30

## 2023-05-19 RX ORDER — TIZANIDINE 2 MG/1
2 TABLET ORAL 3 TIMES DAILY PRN
Qty: 90 TABLET | Refills: 1 | Status: SHIPPED | OUTPATIENT
Start: 2023-05-19 | End: 2023-06-13

## 2023-05-19 NOTE — TELEPHONE ENCOUNTER
RN called [facility] and left detailed message of provider approval of home care order below. Left clinic number [phone] for any further questions or concerns.    EVELYN Rajput  [facility] Primary Care Triage

## 2023-05-19 NOTE — TELEPHONE ENCOUNTER
Incoming call from pt's daughter, Cata. See Consent to Communicate form giving authorization to discuss PHI with Cata, scanned on 6/18/15.    Cata states the 12 tablets of oxycodone will not last pt until the date of follow-up appointment with PCP on 6/6/23, and with surgeon on 6/6/23 to discuss knee surgery.    1. Cata requests that a larger quantity of oxycodone be sent to pharmacy for pt, so pt has quantity sufficient to manage her pain until a new plan can be established at 6/6/23 appointments.    2. Cata requests that the tizanidine, prescribed during recent hospitalization, be refilled for pt as well.    Liz Rosales, REEMAN, RN

## 2023-05-19 NOTE — PROGRESS NOTES
Diabetes Self-Management Education & Support Telephone Visit  Hollydarryl Muir contacted today for education related to Type 2 diabetes    Patient is being treated with:  oral agents and insulin    Purpose of today's visit:  Review BG      Subjective/Objective:  Hospitalized [facility] for Pneumonia and PE 5/7-5/10.     Current Regimen:  Lantus 30-35 units daily  Metformin 1500 mg   Pioglitazone 45 mg  Glimepiride 4mg BID          Assessment/Plan:  Physical therapy was at home after hospitalization for Pneumonia and PE. Phone visit completed with daughter, Cata. BG ran above target in hospital. Since coming home has improved. Primarily in range, random occasional spike. Primary care advised daughter that Metformin and Pioglitazone increase risk for blood clots and want Endo to consider discontinuing. Daughter feels current regimen is working for patient and would rather continue as is. Will discuss with Dr. Medrano and contact Cata via My Chart.    Argelia Maciel RN, Diabetes Educator  Diabetes Education Department  [facility], [facility], [facility] and [place]  [phone]      Any diabetes medication dose changes were made via the CDE Protocol and Collaborative Practice Agreement. A copy of this encounter was provided to the patient's referring provider.      Time spent in this visit:  9 minutes                      Lantus 30-35 units daily  Metformin 1500 mg   Actos  Glimepiride 4mg BID

## 2023-05-19 NOTE — TELEPHONE ENCOUNTER
The Home Care/Assisted Living/Nursing Facility is calling regarding an established patient.  Has the patient seen Home Care in the past or is currently residing in Assisted Living or Nursing Facility? Yes.     Radha PT calling from [facility] requesting the following orders that are NOT within the Home Care, Assisted Living or Nursing Home Eval and Treatment standing order and can be signed as standing order signature required by RN.    Preferred Call Back Number: [phone]        PT/OT/Speech Therapy   PT 1x a week for three weeks;       Any additional Orders:  Are there any orders requested, not stated above, that are outside of the standing order and must be routed to a licensed practitioner for approval?    No    Writer has verified Requestor will send fax to have orders signed.    Marielle Solares RN

## 2023-05-19 NOTE — LETTER
5/19/2023         RE: Holly Muir  [address] [address] [facility] [address]        Dear Colleague,    Thank you for referring your patient, Holly Muir, to the [facility]. Please see a copy of my visit note below.    Diabetes Self-Management Education & Support Telephone Visit  Holly Muir contacted today for education related to Type 2 diabetes    Patient is being treated with:  oral agents and insulin    Purpose of today's visit:  Review BG      Subjective/Objective:  [facility].     Current Regimen:  Lantus 30-35 units daily  Metformin 1500 mg   Pioglitazone 45 mg  Glimepiride 4mg BID          Assessment/Plan:  Physical therapy was at home after hospitalization for Pneumonia and PE. Phone visit completed with daughter, Cata. BG ran above target in hospital. Since coming home has improved. Primarily in range, random occasional spike. Primary care advised daughter that Metformin and Pioglitazone increase risk for blood clots and want Endo to consider discontinuing. Daughter feels current regimen is working for patient and would rather continue as is. Will discuss with Dr. Medrano and contact Cata via My Chart.    Argelia Maciel RN, Diabetes Educator  Diabetes Education Department  [facility], [facility] and [place]  [phone]      Any diabetes medication dose changes were made via the CDE Protocol and Collaborative Practice Agreement. A copy of this encounter was provided to the patient's referring provider.      Time spent in this visit:  9 minutes                      Lantus 30-35 units daily  Metformin 1500 mg   Actos  Glimepiride 4mg BID        Again, thank you for allowing me to participate in the care of your patient.        Sincerely,        Argelia Maciel RN

## 2023-05-21 DIAGNOSIS — Z79.4 TYPE 2 DIABETES MELLITUS WITH DIABETIC NEUROPATHY, WITH LONG-TERM CURRENT USE OF INSULIN (H): Primary | ICD-10-CM

## 2023-05-21 DIAGNOSIS — E11.40 TYPE 2 DIABETES MELLITUS WITH DIABETIC NEUROPATHY, WITH LONG-TERM CURRENT USE OF INSULIN (H): ICD-10-CM

## 2023-05-21 DIAGNOSIS — E11.40 TYPE 2 DIABETES MELLITUS WITH DIABETIC NEUROPATHY, WITH LONG-TERM CURRENT USE OF INSULIN (H): Primary | ICD-10-CM

## 2023-05-21 DIAGNOSIS — E11.9 TYPE 2 DIABETES MELLITUS WITHOUT COMPLICATION, WITH LONG-TERM CURRENT USE OF INSULIN (H): ICD-10-CM

## 2023-05-21 DIAGNOSIS — Z79.4 TYPE 2 DIABETES MELLITUS WITHOUT COMPLICATION, WITH LONG-TERM CURRENT USE OF INSULIN (H): ICD-10-CM

## 2023-05-21 DIAGNOSIS — Z79.4 TYPE 2 DIABETES MELLITUS WITH DIABETIC NEUROPATHY, WITH LONG-TERM CURRENT USE OF INSULIN (H): ICD-10-CM

## 2023-05-21 RX ORDER — BISMUTH SUBSALICYLATE 262MG/15ML
SUSPENSION, ORAL (FINAL DOSE FORM) ORAL
Qty: 400 EACH | Refills: 3 | Status: SHIPPED | OUTPATIENT
Start: 2023-05-21

## 2023-05-22 ENCOUNTER — TELEPHONE (OUTPATIENT)
Dept: FAMILY MEDICINE | Facility: CLINIC | Age: 68
End: 2023-05-22
Payer: COMMERCIAL

## 2023-05-22 DIAGNOSIS — M17.11 PRIMARY OSTEOARTHRITIS OF RIGHT KNEE: ICD-10-CM

## 2023-05-22 DIAGNOSIS — M25.561 CHRONIC PAIN OF RIGHT KNEE: ICD-10-CM

## 2023-05-22 DIAGNOSIS — G89.29 CHRONIC PAIN OF RIGHT KNEE: ICD-10-CM

## 2023-05-22 DIAGNOSIS — F11.90 OPIOID USE DISORDER: ICD-10-CM

## 2023-05-22 NOTE — TELEPHONE ENCOUNTER
Called Pt to relay message about medication from note encounter Pt understood. Pt stated that she is out of oxycodone and needs a refill Pt is having pain. Sending to provider to review.      Moraima ROCA Visit Facilitator

## 2023-05-23 ENCOUNTER — TELEPHONE (OUTPATIENT)
Dept: FAMILY MEDICINE | Facility: CLINIC | Age: 68
End: 2023-05-23
Payer: COMMERCIAL

## 2023-05-23 ENCOUNTER — MEDICAL CORRESPONDENCE (OUTPATIENT)
Dept: HEALTH INFORMATION MANAGEMENT | Facility: CLINIC | Age: 68
End: 2023-05-23
Payer: COMMERCIAL

## 2023-05-23 RX ORDER — OXYCODONE HYDROCHLORIDE 5 MG/1
5 TABLET ORAL EVERY 6 HOURS PRN
Qty: 60 TABLET | Refills: 0 | Status: SHIPPED | OUTPATIENT
Start: 2023-05-23 | End: 2023-06-10

## 2023-05-23 NOTE — TELEPHONE ENCOUNTER
Called Pt regarding note below Pt was so happy for refill.       Moraima ROCA Visit Facilitator

## 2023-05-23 NOTE — TELEPHONE ENCOUNTER
Forms/Letter Request    Type of form/letter: [facility] order # [id number]    Have you been seen for this request: N/A    Do we have the form/letter: Yes: placed in providers forms basket for review    When is form/letter needed by: ASAP    How would you like the form/letter returned: Fax  [phone]

## 2023-05-24 DIAGNOSIS — G89.29 CHRONIC PAIN OF RIGHT KNEE: ICD-10-CM

## 2023-05-24 DIAGNOSIS — F11.90 OPIOID USE DISORDER: ICD-10-CM

## 2023-05-24 DIAGNOSIS — M25.561 CHRONIC PAIN OF RIGHT KNEE: ICD-10-CM

## 2023-05-24 DIAGNOSIS — M17.11 PRIMARY OSTEOARTHRITIS OF RIGHT KNEE: ICD-10-CM

## 2023-05-24 RX ORDER — OXYCODONE HYDROCHLORIDE 5 MG/1
5 TABLET ORAL EVERY 6 HOURS PRN
Qty: 60 TABLET | Refills: 0 | OUTPATIENT
Start: 2023-05-24

## 2023-05-24 NOTE — TELEPHONE ENCOUNTER
Medication Question or Refill        What medication are you calling about (include dose and sig)?: oxyCODONE (ROXICODONE) 5 MG tablet    Controlled Substance Agreement on file:   CSA -- Patient Level:    CSA: None found at the patient level.       Who prescribed the medication?:MELVIN GUERRA    Do you have any questions or concerns?  yes

## 2023-05-25 ENCOUNTER — VIRTUAL VISIT (OUTPATIENT)
Dept: PHARMACY | Facility: CLINIC | Age: 68
End: 2023-05-25
Payer: COMMERCIAL

## 2023-05-25 ENCOUNTER — TELEPHONE (OUTPATIENT)
Dept: PHARMACY | Facility: CLINIC | Age: 68
End: 2023-05-25
Payer: COMMERCIAL

## 2023-05-25 DIAGNOSIS — Z78.9 TAKES DIETARY SUPPLEMENTS: ICD-10-CM

## 2023-05-25 DIAGNOSIS — M17.11 PRIMARY OSTEOARTHRITIS OF RIGHT KNEE: ICD-10-CM

## 2023-05-25 DIAGNOSIS — E11.65 TYPE 2 DIABETES MELLITUS WITH HYPERGLYCEMIA, WITH LONG-TERM CURRENT USE OF INSULIN (H): Primary | ICD-10-CM

## 2023-05-25 DIAGNOSIS — R21 SKIN RASH: ICD-10-CM

## 2023-05-25 DIAGNOSIS — E11.42 DIABETIC POLYNEUROPATHY ASSOCIATED WITH TYPE 2 DIABETES MELLITUS (H): ICD-10-CM

## 2023-05-25 DIAGNOSIS — D63.1 ANEMIA DUE TO STAGE 3A CHRONIC KIDNEY DISEASE (H): ICD-10-CM

## 2023-05-25 DIAGNOSIS — I26.99 PULMONARY EMBOLISM, OTHER, UNSPECIFIED CHRONICITY, UNSPECIFIED WHETHER ACUTE COR PULMONALE PRESENT (H): ICD-10-CM

## 2023-05-25 DIAGNOSIS — E78.5 HYPERLIPIDEMIA LDL GOAL <100: ICD-10-CM

## 2023-05-25 DIAGNOSIS — Z79.4 TYPE 2 DIABETES MELLITUS WITH HYPERGLYCEMIA, WITH LONG-TERM CURRENT USE OF INSULIN (H): Primary | ICD-10-CM

## 2023-05-25 DIAGNOSIS — F25.9 SCHIZOAFFECTIVE DISORDER, UNSPECIFIED TYPE (H): ICD-10-CM

## 2023-05-25 DIAGNOSIS — G25.81 RESTLESS LEG SYNDROME: ICD-10-CM

## 2023-05-25 DIAGNOSIS — N18.31 ANEMIA DUE TO STAGE 3A CHRONIC KIDNEY DISEASE (H): ICD-10-CM

## 2023-05-25 DIAGNOSIS — J30.1 SEASONAL ALLERGIC RHINITIS DUE TO POLLEN: ICD-10-CM

## 2023-05-25 DIAGNOSIS — K21.9 GASTROESOPHAGEAL REFLUX DISEASE WITHOUT ESOPHAGITIS: ICD-10-CM

## 2023-05-25 DIAGNOSIS — R21 RASH: ICD-10-CM

## 2023-05-25 DIAGNOSIS — I10 BENIGN ESSENTIAL HYPERTENSION: ICD-10-CM

## 2023-05-25 PROCEDURE — 99605 MTMS BY PHARM NP 15 MIN: CPT | Performed by: PHARMACIST

## 2023-05-25 PROCEDURE — 99607 MTMS BY PHARM ADDL 15 MIN: CPT | Performed by: PHARMACIST

## 2023-05-25 NOTE — PROGRESS NOTES
Medication Therapy Management (MTM) Encounter    ASSESSMENT:                            Medication Adherence/Access: See below for considerations    Type 2 Diabetes: concerned about low blood sugar overnight, especially with age and fall risk with taking several CNS depressants. Has not tried a GLP-1 RA or SGLT2-I yet. Discussed these two options and patient interested in trying a GLP-1 RA for it's added weight loss benefit. Recommend trying to start a GLP-1 RA if covered on insurance and taper off glimepiride to reduce hypoglycemia and fall risk.     As future considerations, could increase metformin dose and start SGLT-2 inhibitor. Could try to stop pioglitazone in the future (side effects including increased risk for bladder cancer, weight gain, edema, hypoglycemia, bone fractures).     Note: upcoming endocrinology appointment in July    Hyperlipidemia: stable    Hypertension: stable. Paroxetine can increase levels of propranolol. Monitor for hypotension and bradycardia.     Neuropathy: stable    Osteoarthritis/chronic pain: stable     Restless legs/Muscle spasms/tremor: stable.   Abilify can decrease effectiveness of ropinirole. Abilify can cause akathisia symptoms (feeling of restlessness) which may appear like restless legs. Recommend psych input/eval.    Schizoaffective disorder: trouble sleeping at night and then feels unsteady during the day.    Bupropion and paroxetine can increase aripiprazole levels through CYP2D6 inhibition. Do not recommend increasing the dose further (max dose is 30 mg/day). May be experiencing akathisia side effect.  Could try duloxetine for neuropathy symptoms and depression in place of paroxetine. May be able to lower gabapentin dose or stop it as a result.   Has been referred for psychiatrist.    Pulmonary embolism (provoked): stable so far on Eliquis. Anticipated stop date: 8/2023    GERD: stable    Skin rash: will reach out to PCP on nystatin refill     Allergies:  stable    Anemia: could recheck iron labs with next labs    Supplements: could check vitamin D with next labs    CNS depressants/sedation:   Oxycodone, gabapentin, tizanidine, trazodone, ropinirole   Continue to monitor, use walker/cane    PLAN:                            Will discuss plan with PCP to consider for upcoming appointment on 6/6/23:  1. Recommend to start Trulicity 0.75 mg once weekly for 4 weeks then increase to 1.5 mg once weekly   2. Recommend to reduce glimepiride dose to 4 mg once daily in the morning when you start Trulicity   3. Will let primary care provider you need a refill of Nystatin powder  4. Gave number to call and schedule with psychiatrist from the referral that was placed  5. Consider repeating vitamin D level and iron levels with next labs    Follow-up: pending PCP plan    SUBJECTIVE/OBJECTIVE:                          Holly Muir is a 68 year old female called for a follow-up visit.  Today's visit is a follow-up MTM visit from 12/28/22.     Reason for visit: MTM     Allergies/ADRs: Reviewed in chart  Past Medical History: Reviewed in chart  Tobacco: She reports that she quit smoking about 47 years ago. Her smoking use included cigarettes. She started smoking about 48 years ago. She has never used smokeless tobacco.  Alcohol: not currently using  Lives between two daughters.     Medication Adherence/Access: daughters help manage medications. Uses a pill box. Patient is not very familiar with her medications.  Patient takes medications 3 time(s) per day. Pain pills are 4 times per day  Per patient, misses medications occasionally. Might miss medications 1 time per week. Was missing 3 times per week so this is an improvement.   Medication barriers: tired by the end of the day.   The patient fills medications at [facility]: NO, fills medications at [facility].    Type 2 Diabetes: Currently taking Lantus (gives around 9pm) 40 units if blood sugar >/=200 and 35 units if blood sugar <200. Has lows  "overnight. Daughters adjust her diet and insulin to accommodate for this. Lows have improved as a result.   Also taking metformin ER 1000 mg in the morning, 500 mg at night. Did not go up to higher dose at night because they were worried about low blood sugar. Also taking glimepiride 4 mg 2 times daily and pioglitazone 45 mg once daily.   Using freestyle stan and has testing supplies at home.   Symptoms of high blood sugar? none  Eye exam: up to date  Foot exam: due  Diet/Exercise: family helps with diabetes-friendly diet. Following with nutrition.  Aspirin: Taking 81 mg daily and denies side effects.   Statin: Yes: simvastatin 20 mg daily   ACEi/ARB: Yes: ramipril 5 mg daily.   Urine Albumin:   Lab Results   Component Value Date    UMALCR 40.70 (H) 04/27/2023      Lab Results   Component Value Date    A1C 8.7 04/26/2023    A1C 9.1 01/19/2023    A1C 8.5 07/14/2022    A1C 10.1 04/22/2022    A1C 8.9 11/12/2021    A1C 10.5 10/08/2021    A1C 7.7 01/06/2021    A1C 7.9 12/11/2020    A1C 10.6 08/24/2020    A1C 9.5 06/18/2020       Hyperlipidemia: Currently taking simvastatin 20 mg once daily.  Patient reports no significant myalgias or other side effects.    Recent Labs   Lab Test 01/19/23  1115 10/08/21  0922 12/19/16  0756 09/18/15  0944   CHOL 84 134   < > 125   HDL 51 76   < > 62   LDL 16 39   < > 36   TRIG 83 96   < > 134   CHOLHDLRATIO  --   --   --  2.0    < > = values in this interval not displayed.     Hypertension: Currently taking ramipril 5 mg once daily and propranolol ER 80 mg once daily. Patient has blood pressure monitored by home nurse and said readings are \"excellent\" but no numbers today. Patient reports she sometimes feels unsteady on her feet.    Labs 5/10/23:  Potassium 4   Scr 0.78  eGFR 83     BP Readings from Last 3 Encounters:   03/31/23 125/77   03/23/23 133/69   01/20/23 129/64     Pulse Readings from Last 3 Encounters:   03/31/23 66   03/23/23 69   01/20/23 71     Neuropathy:   Gabapentin 600 " mg 2 times daily. This helps with symptoms (pins/needles feeling in feet). Feels unsteady and tired during the day.    Osteoarthritis/chronic pain:    Oxycodone 5 mg every 6 hours for pain   Naloxone - hasn't had to use  Acetaminophen 1000 mg every 8 hours (max 4g per day)  Pain is controlled right now. Daytime tiredness.    Restless legs/Muscle spasms/tremor:   Tizanidine 2 mg 3 times daily as needed.   Ropinirole 0.25 mg once daily at night  Propranolol ER 80 mg once daily  Finds these helpful. Feels unsteady and tired during the day.     Schizoaffective disorder:   Abilify 15 mg once daily  Bupropion  mg once daily  Paroxetine 40 mg once daily  Buspirone 30 mg 2 times daily   Trazodone 75 mg at bedtime  Sleeping poorly at night. Feels unsteady and tired during the day. Needs new psychiatrist    Pulmonary embolism (provoked):  Eliquis 5 mg 2 times daily for 3 months (May-August)  No signs/symptoms of bleeding/bruising.  No recent falls.     GERD: Current medications include: Prilosec (omeprazole) 40 mg once daily. Patient reports no current symptoms.  Patient feels that current regimen is effective.    Dermatochalasis:   Not currently using anything. Requested refill of nystatin powder which she finds effective.     Allergies:   Not taking cetirizine right now. No allergy symptoms right now.     Anemia:   Ferrous gluconate 324 mg once daily plus vitamin C daily.  No concerns today  Ferritin   Date Value Ref Range Status   10/14/2020 40 8 - 252 ng/mL Final     Iron   Date Value Ref Range Status   10/14/2020 43 35 - 180 ug/dL Final     Iron Binding Cap   Date Value Ref Range Status   10/14/2020 434 (H) 240 - 430 ug/dL Final     Supplements: Currently taking:   Vitamin D 1000 units once daily  No concerns today.  25 OH Vit D2   Date Value Ref Range Status   02/03/2015 <5 ug/L Final     25 OH Vit D3   Date Value Ref Range Status   02/03/2015 49 ug/L Final     25 OH Vit D total   Date Value Ref Range Status    02/03/2015  30 - 75 ug/L Final    <54  Season, race, dietary intake, and treatment affect the concentration of   25-hydroxy-Vitamin D. Values may decrease during winter months and increase   during summer months. Values less than 30 ug/L may indicate Vitamin D   deficiency.   This test was developed and its performance characteristics determined by the   [facility],  Special [facility] Laboratory. It has   not been cleared or approved by the FDA. The laboratory is regulated under CLIA   as qualified to perform high-complexity testing. This test is used for clinical   purposes. It should not be regarded as investigational or for research.         Today's Vitals: There were no vitals taken for this visit.  ----------------  Post Discharge Medication Reconciliation Status: discharge medications reconciled, continue medications without change.    I spent 40 minutes with this patient today. All changes were made via collaborative practice agreement with Dr. King. A copy of the visit note was provided to the patient's provider(s).    A summary of these recommendations was sent via [employer].    Telemedicine Visit Details  Type of service:  Telephone visit  Start Time: 2:08 pm  End Time: 2:48 pm     Medication Therapy Recommendations  Anemia    Current Medication: ferrous gluconate (FERGON) 324 (38 Fe) MG tablet   Rationale: Medication requires monitoring - Needs additional monitoring   Recommendation: Order Lab   Status: Contact Provider - Awaiting Response         Takes dietary supplements    Current Medication: VITAMIN D3 25 MCG (1000 UT) tablet   Rationale: Medication requires monitoring - Needs additional monitoring   Recommendation: Order Lab   Status: Contact Provider - Awaiting Response         Type 2 diabetes mellitus with hyperglycemia, with long-term current use of insulin (H)    Current Medication: glimepiride (AMARYL) 2 MG tablet   Rationale: Undesirable effect - Adverse medication  event - Safety   Recommendation: Decrease Frequency   Status: Contact Provider - Awaiting Response          Current Medication: metFORMIN (GLUCOPHAGE XR) 500 MG 24 hr tablet   Rationale: Synergistic therapy - Needs additional medication therapy - Indication   Recommendation: Start Medication - Trulicity 1.5 MG/0.5ML Sopn   Status: Contact Provider - Awaiting Response

## 2023-05-25 NOTE — Clinical Note
Hi, I met with Holly for an MTM visit (referred through insurance list). Wondering if it makes more sense for Aurelia to follow since I'm based in the ID clinic at the [facility]. I have a few recommendations for diabetes management due to hypos. Aurelia, would you mind reaching out to coordinate follow-up care and DM changes? I see you've already reached out a few times and can let the patient know you'll be checking in. Please let me know. Dr. King, can you please refill Nystatin if appropriate? Thank you, Patricia Lion, ID MTM pharmacist

## 2023-05-25 NOTE — TELEPHONE ENCOUNTER
See encounter from today.  Patricia Lion, PharmD, AAHIVP  Medication Therapy Management Pharmacist

## 2023-05-25 NOTE — LETTER
_  Medication List        Prepared on: May 30, 2023     Bring your Medication List when you go to the doctor, hospital, or   emergency room. And, share it with your family or caregivers.     Note any changes to how you take your medications.  Cross out medications when you no longer use them.    Medication How I take it Why I use it Prescriber   ARIPiprazole (ABILIFY) 15 MG tablet Take 15 mg by mouth At Bedtime  Schizoaffective disorder Patient Reported   blood glucose (NO BRAND SPECIFIED) test strip Use to test blood sugar 4 times daily or as directed. accu check guide strips Type 2 diabetes mellitus without complication, with long-term current use of insulin (H) Elizabeth Medrano MD   blood glucose monitoring (NO BRAND SPECIFIED) meter device kit Use to test blood sugar 4  times daily or as directed. Accuceck Guide meter Type 2 diabetes mellitus with diabetic neuropathy, with long-term current use of insulin (H) Elizabeth Medrano MD   blood glucose monitoring (ULTRA THIN 30G) lancets Use to test blood sugar 4  times daily or as directed.lancets for acchu check guide Type 2 diabetes mellitus without complication, with long-term current use of insulin (H) Elizabeth Medrano MD   buPROPion (WELLBUTRIN XL) 150 MG 24 hr tablet Take 150 mg by mouth daily  Schizoaffective disorder Patient Reported   busPIRone HCl (BUSPAR) 30 MG tablet TAKE 1 TABLET BY MOUTH TWICE A DAY Anxiety Tiffany King MD PhD   cetirizine (ZYRTEC) 10 MG tablet TAKE 1 TABLET BY MOUTH EVERY DAY Seasonal allergic rhinitis, unspecified trigger Tiffany King MD PhD   Continuous Blood Gluc  (FREESTYLE CM 2 READER) GALINA 1 Dose continuous Type 2 diabetes mellitus without complication, with long-term current use of insulin (H) Elizabeth Medrano MD   Continuous Blood Gluc Sensor (FREESTYLE CM 2 SENSOR) MISC 1 Dose continuous Type 2 diabetes mellitus without complication, with long-term current use of insulin (H) Elizabeth Medrano MD   [employer] ACETAMINOPHEN EX  MG tablet  TAKE 2 TABLETS BY MOUTH EVERY 8 HOURS. MAX ACETAMINOPHEN DOSE: 4000MG IN 24 HRS. Arthralgia of both knees Tiffany King MD PhD   CVS ASPIRIN ADULT LOW DOSE 81 MG chewable tablet TAKE 1 TABLET BY MOUTH EVERY DAY Type 2 diabetes mellitus with hyperglycemia (H) Tiffany King MD PhD   ferrous gluconate (FERGON) 324 (38 Fe) MG tablet TAKE 1 TABLET (324 MG) BY MOUTH DAILY (WITH BREAKFAST) Iron deficiency anemia, unspecified iron deficiency anemia type Tiffany King MD PhD   gabapentin (NEURONTIN) 600 MG tablet TAKE 2 TABLETS (1,200 MG) BY MOUTH 3 TIMES DAILY FOR 30 DAYS Arthralgia of both knees; Closed fracture of fifth metatarsal bone of left foot, physeal involvement unspecified, initial encounter; Restless Leg Syndrome Tiffany King MD PhD   glimepiride (AMARYL) 2 MG tablet Take 2 tablets (4 mg) by mouth 2 times daily (before meals) Type 2 diabetes mellitus without complication, with long-term current use of insulin (H) Elizabeth Medrano MD   insulin glargine (LANTUS SOLOSTAR) 100 UNIT/ML pen Inject 45 units at bedtime + 2 units for priming of pen Type 2 diabetes mellitus with diabetic neuropathy (H) Elizabeth Medrano MD   insulin pen needle (31G X 5 MM) 31G X 5 MM miscellaneous Use four times 4 day times a day Type 2 Diabetes Mellitus, Uncontrolled (H) Elizabeth Medrano MD   metFORMIN (GLUCOPHAGE XR) 500 MG 24 hr tablet Take two tablets with breakfast and one tablet with dinner for one week then increase to two tablets with breakfast and two tablets with dinner thereafter Type 2 diabetes mellitus with hyperglycemia, with long-term current use of insulin (H) Elizabeth Medrano MD   Multiple Vitamin (DAILY-ISAAC MULTIVITAMIN) TABS TAKE 1 TABLET BY MOUTH EVERY DAY Severe Major Depression with Psychotic Features (H) Tiffany King MD PhD   naloxone (NARCAN) 4 MG/0.1ML nasal spray Spray 1 spray (4 mg) into one nostril alternating nostrils once as needed for opioid reversal every 2-3 minutes until assistance arrives Chronic Pain Syndrome; Continuous Opioid  Dependence (H) Tiffany King MD PhD   nystatin (MYCOSTATIN) 919112 UNIT/GM external powder Apply 1 dose topically 3 times daily as needed Skin Rash Tiffany King MD PhD   omeprazole (PRILOSEC) 20 MG DR capsule TAKE 2 CAPSULES (40 MG) BY MOUTH DAILY *TAKE 30-60 MINUTES BEFORE A MEAL* Gastroesophageal Reflux Disease without Esophagitis Tiffany King MD PhD   oxyCODONE (ROXICODONE) 5 MG tablet Take 1 tablet (5 mg) by mouth every 6 hours as needed for pain Primary osteoarthritis of right knee; Chronic pain of right knee; Opioid use disorder (H) Tiffany King MD PhD   PARoxetine (PAXIL) 20 MG tablet TAKE 2 TABLETS AT BEDTIME (Prescribed by Dr. Isael Jiménez at [facility]) Severe recurrent major depressive disorder with psychotic features (H) Tiffany King MD PhD   pioglitazone (ACTOS) 45 MG tablet Take 1 tablet (45 mg) by mouth daily Insulin Resistance Elizabeth Medrano MD   propranolol ER (INDERAL LA) 80 MG 24 hr capsule TAKE 1 CAPSULE BY MOUTH EVERY DAY Tremor Tiffany King MD PhD   ramipril (ALTACE) 5 MG capsule TAKE 1 CAPSULE BY MOUTH EVERY DAY HTN, goal below 140/90 Tiffany King MD PhD   rOPINIRole (REQUIP) 0.25 MG tablet TAKE 1 TABLET (0.25 MG) BY MOUTH EVERY EVENING Restless Leg Syndrome Tiffany King MD PhD   simvastatin (ZOCOR) 20 MG tablet TAKE 1 TABLET BY MOUTH EVERYDAY AT BEDTIME High Cholesterol Wayne Karimi MD   tiZANidine (ZANAFLEX) 2 MG tablet Take 1 tablet (2 mg) by mouth 3 times daily as needed for muscle spasms Primary osteoarthritis of right knee; Chronic pain of right knee Tiffany King MD PhD   traZODone (DESYREL) 50 MG tablet Take 1.5 tablets (75 mg) by mouth At Bedtime Schizoaffective disorder Tiffany King MD PhD   vitamin C (CVS VITAMIN C) 500 MG tablet 1/2 TABLET BY MOUTH JACOBSON WITH IRON SUPPLEMENT BEFORE MEAL Iron deficiency anemia, unspecified iron deficiency anemia type Tiffany King MD PhD   VITAMIN D3 25 MCG (1000 UT) tablet TAKE 1 TABLET BY MOUTH EVERY DAY Severe recurrent major depressive disorder with psychotic features  (H) Tiffany King MD PhD         Add new medications, over-the-counter drugs, herbals, vitamins, or  minerals in the blank rows below.    Medication How I take it Why I use it Prescriber                                      Allergies:      morphine sulfate        Side effects I have had:               Other Information:              My notes and questions:

## 2023-05-25 NOTE — LETTER
May 30, 2023  Holly Muir  [address] [address] [place] [address]    Dear QUINN [facility] AND INFECTIOUS DISEASES [facility]     Thank you for talking with me on May 25, 2023 about your health and medications. As a follow-up to our conversation, I have included two documents:      1. Your Recommended To-Do List has steps you should take to get the best results from your medications.  2. Your Medication List will help you keep track of your medications and how to take them.    If you want to talk about these documents, please call LEONELA GARCIA RPH at phone: [phone], Monday-Friday 8-4:30pm.    I look forward to working with you and your doctors to make sure your medications work well for you.    Sincerely,  LEONELA GARCIA RPH  [facility] Pharmacist, [facility]

## 2023-05-25 NOTE — LETTER
"Recommended To-Do List      Prepared on: May 30, 2023       You can get the best results from your medications by completing the items on this \"To-Do List.\"      Bring your To-Do List when you go to your doctor. And, share it with your family or caregivers.    My To-Do List:  What we talked about: What I should do:   Needing additional monitoring    Get the following lab test(s): iron labs          What we talked about: What I should do:   Needing additional monitoring    Get the following lab test(s): vitamin D level          What we talked about: What I should do:   An issue with your medication    Decrease how often you take glimepiride (AMARYL); will discuss with primary care provider          What we talked about: What I should do:   A new medication that may be of benefit to you    Start taking Trulicity; will discuss with primary care provider          What we talked about: What I should do:                     "

## 2023-05-26 NOTE — PROGRESS NOTES
[facility] Care Coordination Contact    e-mail from [facility]: Guerline Servin, it looks like we received a prescription for a transfer bench for this member last week. The bench was sent out from our warehouse on Wednesday and should be arriving soon.    Malathi Bacon RN PHN  [facility]  [phone]

## 2023-05-30 NOTE — PATIENT INSTRUCTIONS
"Recommendations from today's MTM visit:                                                    MTM (medication therapy management) is a service provided by a clinical pharmacist designed to help you get the most of out of your medicines.   Today we reviewed what your medicines are for, how to know if they are working, that your medicines are safe and how to make your medicine regimen as easy as possible.      Will discuss plan with PCP to consider for upcoming appointment on 6/6/23:  1. Recommend to start Trulicity 0.75 mg once weekly for 4 weeks then increase to 1.5 mg once weekly (pending insurance coverage)  2. Recommend to reduce glimepiride dose to 4 mg once daily in the morning when you start Trulicity with goal to taper off completely if possible  3. Will let primary care provider you need a refill of Nystatin powder  4. Gave number to call and schedule with psychiatrist from the referral that was placed  5. Consider repeating vitamin D level and iron levels with next labs     Follow-up: pending PCP plan    It was great speaking with you today.  I value your experience and would be very thankful for your time in providing feedback in our clinic survey. In the next few days, you may receive an email or text message from [employer] with a link to a survey related to your  clinical pharmacist.\"     To schedule another MTM appointment, please call the clinic directly or you may call the MTM scheduling line at [phone] or toll-free at [phone].     My Clinical Pharmacist's contact information:                                                      Please feel free to contact me with any questions or concerns you have.      Patricia Lion, PharmD, AAHIVP  Medication Therapy Management Pharmacist   May 30, 2023  [phone]    "

## 2023-05-31 RX ORDER — NYSTATIN 100000 [USP'U]/G
POWDER TOPICAL
Qty: 60 G | Refills: 3 | Status: SHIPPED | OUTPATIENT
Start: 2023-05-31

## 2023-06-01 ENCOUNTER — NURSE TRIAGE (OUTPATIENT)
Dept: FAMILY MEDICINE | Facility: CLINIC | Age: 68
End: 2023-06-01
Payer: COMMERCIAL

## 2023-06-01 NOTE — TELEPHONE ENCOUNTER
"Nurse Triage SBAR    Is this a 2nd Level Triage? NO    Situation: Patient's daughter Mirna (no CTC on file) calling to report patient took all AM and PM medications this morning. Patient present during the phone call.    Background: Patient states she had forgotten if she had taken her medications, and therefore accidentally took her AM and PM medications all at once. Daughter noticed that patient did not have any her PM tablets in the pill pack, and was concerned that patient may have ingested too much. Daughter attempted to have patient go to the ED for monitoring as no one will be with patient later in the day. Daughter reports that she felt patient is nonchalant about the whole situation, \"she just doesn't care\".     Daughter also reports that she had a similar episode last month, but it was with the patient taking too much of her narcotic medication. Since then family has kept her narcotics in a separate place so that patient will not make the same mistake. Daughter believes that patient did not take any extra doses of Oxycodone, but did take extra doses of all her other medications.     Assessment: Patient denies having any symptoms right now. Patient appeared very hesitant to follow daughter's request to being seen at the ED. Daughter also became very tearful during the call due to her concerns.    Protocol Recommended Disposition:   Call Poison Center Now    Recommendation: RN encouraged for patient to be seen at the ED, as patient does have medications she is taking for diabetes. Patient verbalized understanding and will have her daughter take her to the nearest ED for further evaluation. RN unable to give poison center number to contact as well as call was ended.     EVELYN Rajput  [facility] Primary Care Triage      Reason for Disposition    MORE THAN A DOUBLE DOSE of a prescription or over-the-counter (OTC) drug    Answer Assessment - Initial Assessment Questions  1. SUBSTANCE: \"What was " "swallowed?\" If necessary, have the caller look at the label on the container.       Patient took all AM and PM medications this morning  2. AMOUNT: \"How much was swallowed?\" (Err on the side of recording the maximal amount that is missing)       All medication tablets for the day  3. ONSET: \"When was it probably swallowed?\" (Minutes or hours ago)       Sometime this morning  4. SYMPTOMS: \"Do you have any symptoms?\" If Yes, ask: \"What are they?\" (e.g., abdominal pain, vomiting, weakness)       Patient reports no symptoms. Patient sounded very frustrated and agitated and also nonchalant about the whole situation  5. SUICIDAL: \"Did you take this to hurt or kill yourself?\"      No. Patient forgot of she had taken her meds or not    Protocols used: POISONING-A-[place]      "

## 2023-06-06 ENCOUNTER — OFFICE VISIT (OUTPATIENT)
Dept: ORTHOPEDICS | Facility: CLINIC | Age: 68
End: 2023-06-06
Payer: COMMERCIAL

## 2023-06-06 ENCOUNTER — TELEPHONE (OUTPATIENT)
Dept: FAMILY MEDICINE | Facility: CLINIC | Age: 68
End: 2023-06-06

## 2023-06-06 DIAGNOSIS — I26.99 OTHER ACUTE PULMONARY EMBOLISM WITHOUT ACUTE COR PULMONALE (H): Primary | ICD-10-CM

## 2023-06-06 DIAGNOSIS — M17.11 PRIMARY OSTEOARTHRITIS OF RIGHT KNEE: Primary | ICD-10-CM

## 2023-06-06 DIAGNOSIS — M17.0 BILATERAL PRIMARY OSTEOARTHRITIS OF KNEE: ICD-10-CM

## 2023-06-06 PROCEDURE — 99213 OFFICE O/P EST LOW 20 MIN: CPT | Performed by: ORTHOPAEDIC SURGERY

## 2023-06-06 PROCEDURE — 20610 DRAIN/INJ JOINT/BURSA W/O US: CPT | Mod: RT | Performed by: FAMILY MEDICINE

## 2023-06-06 RX ORDER — TRIAMCINOLONE ACETONIDE 40 MG/ML
40 INJECTION, SUSPENSION INTRA-ARTICULAR; INTRAMUSCULAR
Status: DISCONTINUED | OUTPATIENT
Start: 2023-06-06 | End: 2023-07-05

## 2023-06-06 RX ADMIN — TRIAMCINOLONE ACETONIDE 40 MG: 40 INJECTION, SUSPENSION INTRA-ARTICULAR; INTRAMUSCULAR at 10:37

## 2023-06-06 ASSESSMENT — KOOS JR
KOOS JR SCORING: 20.94
BENDING TO THE FLOOR TO PICK UP OBJECT: EXTREME
RISING FROM SITTING: SEVERE
GOING UP OR DOWN STAIRS: EXTREME
STRAIGHTENING KNEE FULLY: EXTREME
STANDING UPRIGHT: MODERATE
TWISING OR PIVOTING ON KNEE: EXTREME
HOW SEVERE IS YOUR KNEE STIFFNESS AFTER FIRST WAKING IN MORNING: EXTREME

## 2023-06-06 ASSESSMENT — PAIN SCALES - GENERAL
PAINLEVEL: EXTREME PAIN (8)
PAINLEVEL: WORST PAIN (10)

## 2023-06-06 NOTE — NURSING NOTE
Holly Muir's chief complaint for this visit includes:  Chief Complaint   Patient presents with     RECHECK     Discuss RTKA - got A1C levels lower.        Referring Provider:  No referring provider defined for this encounter.    There were no vitals taken for this visit.  Extreme Pain (8)   Global Mental Health Score: (P) 7  Global Physical Health Score: (P) 6  PROMIS TOTAL - SUBSCORES: (P) 13  UCLA: 1-2, KOOSJR: 20.94    Pain increases with: constant pain, unable to do most tasks due to the pain and the tasks she is able to do causes an increase in pain.   Previous surgeries: No  Previous injections within last 6 months: Yes, 02/2023  Treatments done: Aspiration 6/6/23,   Imaging completed: 3/28/23  Pain: 8/10- after aspiration, was 10/10 before  Concerns: Wants to schedule surgery.    Martinez Patel, ATC

## 2023-06-06 NOTE — PROGRESS NOTES
Chief Complaint: RECHECK (Discuss RTKA - got A1C levels lower. )         HPI: Holly Muir returns today in follow-up for her knees.  She reports that she continues to be very symptomatic and is here hoping to discuss total knee for both right and left.  Unfortunately she reports that she also recently had a pulmonary embolus and is on Eliquis.    Hemoglobin A1c recheck: 8.1%.  She reports she is about to start physical therapy    .  This was intended to act as a prerehab for total knee.    Medications and allergies are documented in the EMR and have been reviewed.      Current Outpatient Medications:      apixaban ANTICOAGULANT (ELIQUIS) 5 MG tablet, Take 1 tablet (5 mg) by mouth 2 times daily, Disp: 60 tablet, Rfl: 2     ARIPiprazole (ABILIFY) 15 MG tablet, Take 15 mg by mouth At Bedtime, Disp: , Rfl:      blood glucose (NO BRAND SPECIFIED) test strip, Use to test blood sugar 4 times daily or as directed. accu check guide strips, Disp: 360 strip, Rfl: 3     blood glucose monitoring (NO BRAND SPECIFIED) meter device kit, Use to test blood sugar 4  times daily or as directed. Accuceck Guide meter, Disp: 1 kit, Rfl: 1     blood glucose monitoring (ULTRA THIN 30G) lancets, Use to test blood sugar 4  times daily or as directed.lancets for acchu check guide, Disp: 400 each, Rfl: 3     buPROPion (WELLBUTRIN XL) 150 MG 24 hr tablet, Take 150 mg by mouth daily, Disp: , Rfl:      busPIRone HCl (BUSPAR) 30 MG tablet, TAKE 1 TABLET BY MOUTH TWICE A DAY, Disp: 60 tablet, Rfl: 5     cetirizine (ZYRTEC) 10 MG tablet, TAKE 1 TABLET BY MOUTH EVERY DAY, Disp: 90 tablet, Rfl: 1     Continuous Blood Gluc  (FREESTYLE CM 2 READER) GALINA, 1 Dose continuous, Disp: 1 each, Rfl: 0     Continuous Blood Gluc Sensor (FREESTYLE CM 2 SENSOR) MISC, 1 Dose continuous, Disp: 9 each, Rfl: 3     [employer]S ACETAMINOPHEN EX  MG tablet, TAKE 2 TABLETS BY MOUTH EVERY 8 HOURS. MAX ACETAMINOPHEN DOSE: 4000MG IN 24 HRS., Disp: 180 tablet,  Rfl: 1     CVS ASPIRIN ADULT LOW DOSE 81 MG chewable tablet, TAKE 1 TABLET BY MOUTH EVERY DAY, Disp: 90 tablet, Rfl: 0     ferrous gluconate (FERGON) 324 (38 Fe) MG tablet, TAKE 1 TABLET (324 MG) BY MOUTH DAILY (WITH BREAKFAST), Disp: 90 tablet, Rfl: 3     gabapentin (NEURONTIN) 600 MG tablet, TAKE 2 TABLETS (1,200 MG) BY MOUTH 3 TIMES DAILY FOR 30 DAYS, Disp: 180 tablet, Rfl: 5     glimepiride (AMARYL) 2 MG tablet, Take 2 tablets (4 mg) by mouth 2 times daily (before meals), Disp: 180 tablet, Rfl: 1     insulin glargine (LANTUS SOLOSTAR) 100 UNIT/ML pen, Inject 45 units at bedtime + 2 units for priming of pen, Disp: 45 mL, Rfl: 1     insulin pen needle (31G X 5 MM) 31G X 5 MM miscellaneous, Use four times 4 day times a day, Disp: 200 each, Rfl: 3     metFORMIN (GLUCOPHAGE XR) 500 MG 24 hr tablet, Take two tablets with breakfast and one tablet with dinner for one week then increase to two tablets with breakfast and two tablets with dinner thereafter, Disp: 360 tablet, Rfl: 1     Multiple Vitamin (DAILY-ISAAC MULTIVITAMIN) TABS, TAKE 1 TABLET BY MOUTH EVERY DAY, Disp: 90 tablet, Rfl: 0     naloxone (NARCAN) 4 MG/0.1ML nasal spray, Spray 1 spray (4 mg) into one nostril alternating nostrils once as needed for opioid reversal every 2-3 minutes until assistance arrives, Disp: 0.2 mL, Rfl: 0     nystatin (MYCOSTATIN) 514775 UNIT/GM external powder, Apply 1 dose topically 3 times daily as needed, Disp: 60 g, Rfl: 3     omeprazole (PRILOSEC) 20 MG DR capsule, TAKE 2 CAPSULES (40 MG) BY MOUTH DAILY *TAKE 30-60 MINUTES BEFORE A MEAL*, Disp: 180 capsule, Rfl: 0     oxyCODONE (ROXICODONE) 5 MG tablet, Take 1 tablet (5 mg) by mouth every 6 hours as needed for pain, Disp: 60 tablet, Rfl: 0     PARoxetine (PAXIL) 20 MG tablet, TAKE 2 TABLETS AT BEDTIME (Prescribed by Dr. Isael Jiménez at [facility]), Disp: 30 tablet, Rfl:      pioglitazone (ACTOS) 45 MG tablet, Take 1 tablet (45 mg) by mouth daily, Disp: 90 tablet, Rfl:  3     propranolol ER (INDERAL LA) 80 MG 24 hr capsule, TAKE 1 CAPSULE BY MOUTH EVERY DAY, Disp: 90 capsule, Rfl: 2     ramipril (ALTACE) 5 MG capsule, TAKE 1 CAPSULE BY MOUTH EVERY DAY, Disp: 90 capsule, Rfl: 2     rOPINIRole (REQUIP) 0.25 MG tablet, TAKE 1 TABLET (0.25 MG) BY MOUTH EVERY EVENING, Disp: 90 tablet, Rfl: 2     simvastatin (ZOCOR) 20 MG tablet, TAKE 1 TABLET BY MOUTH EVERYDAY AT BEDTIME, Disp: 90 tablet, Rfl: 2     tiZANidine (ZANAFLEX) 2 MG tablet, Take 1 tablet (2 mg) by mouth 3 times daily as needed for muscle spasms, Disp: 90 tablet, Rfl: 1     traZODone (DESYREL) 50 MG tablet, Take 1.5 tablets (75 mg) by mouth At Bedtime, Disp: , Rfl:      vitamin C (CVS VITAMIN C) 500 MG tablet, 1/2 TABLET BY MOUTH JACOBSON WITH IRON SUPPLEMENT BEFORE MEAL, Disp: 45 tablet, Rfl: 3     VITAMIN D3 25 MCG (1000 UT) tablet, TAKE 1 TABLET BY MOUTH EVERY DAY, Disp: 90 tablet, Rfl: 3    Current Facility-Administered Medications:      lidocaine 1 % injection 2 mL, 2 mL, , , Christopher Sanderson, DO, 2 mL at 11/19/22 1030     triamcinolone (KENALOG-40) injection 40 mg, 40 mg, , , Santy Harrington DO, 40 mg at 02/27/23 1112     triamcinolone (KENALOG-40) injection 40 mg, 40 mg, , , Christopher Sanderson DO, 40 mg at 11/19/22 1030     triamcinolone (KENALOG-40) injection 40 mg, 40 mg, , , Santy Harrington DO, 40 mg at 11/10/22 1401     triamcinolone (KENALOG-40) injection 40 mg, 40 mg, , , Santy Harrington DO, 40 mg at 11/10/22 1401    Allergies: Morphine sulfate    Current Status:  JOSE LUIS Jr:  UCLA:  Global Mental Health Score - (P) 7  Global Physical Health Score - (P) 6  PROMIS TOTAL - SUBSCORES - (P) 13    Physical Exam:  On physical examination the patient appears the stated age, is in no acute distress, affect is appropriate, and breathing is non-labored.    There were no vitals taken for this visit.  There is no height or weight on file to calculate BMI.    She presents in a wheelchair from clinic.  Appearance:  benign  Clinical alignment: Neutral  Effusion: No  Extension: 10  Flexion: 80  Recent pulmonary embolus    Assessment and plan:.  On Eliquis.  Currently not appropriate for total knee.  We discussed it would be 6 to 12 months before we would want to go forward with a knee replacement.  She would like to see hematology to be evaluated for risk factors.  No ref. provider found

## 2023-06-06 NOTE — NURSING NOTE
[facility]   ORTHOPEDICS & SPORTS MEDICINE  [address] [place] [address]  Dept: [phone]  ______________________________________________________________________________    Patient: Holly Muir   : 1955   MRN: [id number]   2023    INVASIVE PROCEDURE SAFETY CHECKLIST    Date: 2023   Procedure: right knee injection  Patient Name: Holly Muir  MRN: [id number]  YOB: 1955    Action: Complete sections as appropriate. Any discrepancy results in a HARD COPY until resolved.     PRE PROCEDURE:  Patient ID verified with 2 identifiers (name and  or MRN): Yes  Procedure and site verified with patient/designee (when able): Yes  Accurate consent documentation in medical record: Yes  H&P (or appropriate assessment) documented in medical record: Yes  H&P must be up to 20 days prior to procedure and updates within 24 hours of procedure as applicable: NA  Relevant diagnostic and radiology test results appropriately labeled and displayed as applicable: NA  Procedure site(s) marked with provider initials: NA    TIMEOUT:  Time-Out performed immediately prior to starting procedure, including verbal and active participation of all team members addressing the following:Yes  * Correct patient identify  * Confirmed that the correct side and site are marked  * An accurate procedure consent form  * Agreement on the procedure to be done  * Correct patient position  * Relevant images and results are properly labeled and appropriately displayed  * The need to administer antibiotics or fluids for irrigation purposes during the procedure as applicable   * Safety precautions based on patient history or medication use    DURING PROCEDURE: Verification of correct person, site, and procedures any time the responsibility for care of the patient is transferred to another member of the care team.       Prior to injection, verified patient identity using patient's name and date of  birth.  Due to injection administration, patient instructed to remain in clinic for 15 minutes  afterwards, and to report any adverse reaction to me immediately.    Joint injection was performed.      Drug Amount Wasted:  None.  Vial/Syringe: Single dose vial  Expiration Date:  1/31/2025      Francesco Toledo, EMT  June 6, 2023

## 2023-06-06 NOTE — TELEPHONE ENCOUNTER
[facility] nurse called. Need verbal orders for resumption of care until Thurs. 6/8 due to patient int [facility]   Please call [phone]

## 2023-06-06 NOTE — PROGRESS NOTES
HISTORY OF PRESENT ILLNESS  Ms. Muir is a pleasant 68 year old female following up with right knee osteoarthritis.  Holly last saw me on May 4, I aspirated her knee at that visit.  This did help her for a short amount of time, although her knee swelling has returned and she is in quite a bit of pain.  She has an appointment with Dr. Sheehan immediately following her visit, fortunately she was able to get her hemoglobin A1c down to 8.1.     PHYSICAL EXAM  General  - normal appearance, in no obvious distress  Musculoskeletal - right knee  - stance: antalgic gait, requires assistance to get up and down from wheelchair  - inspection: 1+ effusion  - palpation: generalized tenderness  - ROM: painful active ROM  Neuro  - no sensory or motor deficit, grossly normal coordination, normal muscle tone       ASSESSMENT & PLAN  Ms. Muir is a 68 year old female following up with right knee osteoarthritis.    Through shared decision making we did decide to repeat her aspiration today.  She does have a visit with Dr. Sheehan now to discuss potential knee arthroplasty.  I do think her recent pulmonary embolus will be a factor.    It was a pleasure seeing Holly.        Santy Harrington DO, CAM      This note was constructed using Dragon dictation software, please excuse any minor errors in spelling, grammar, or syntax.        **Holly and I briefly followed up after her visit with Dr. Sheehan.  The surgical decision is being postponed until her duration of anticoagulation therapy can be rectified.  I did inject her knee today with corticosteroid.**    Large Joint Injection/Arthocentesis: R knee joint    Date/Time: 6/6/2023 10:37 AM    Performed by: Santy Harrington DO  Authorized by: Santy Harrington DO    Indications:  Pain  Needle Size:  22 G  Guidance: landmark guided    Approach:  Lateral  Location:  Knee      Medications:  40 mg triamcinolone 40 MG/ML  Outcome:  Tolerated well, no immediate complications  Procedure discussed:  discussed risks, benefits, and alternatives    Consent Given by:  Patient  Timeout: timeout called immediately prior to procedure    Prep: patient was prepped and draped in usual sterile fashion       PROCEDURE    Knee Injection - Intraarticular  The patient was informed of the risks and the benefits of the procedure and a written consent was signed.  The patient s right knee was prepped with chlorhexidine in sterile fashion.   40 mg of triamcinolone suspension was drawn up into a 5 mL syringe with 4 mL of 1% lidocaine.  Injection was performed using substerile technique.  A 1.5-inch 22-gauge needle was used to enter the lateral aspect of the knee.  Injection performed successfully without difficulty.  There were no complications. The patient tolerated the procedure well. There was negligible bleeding.

## 2023-06-06 NOTE — LETTER
6/6/2023         RE: Holly Muir  [address] [address] [id number]        Dear Colleague,    Thank you for referring your patient, Holly Muir, to the [facility] [place]. Please see a copy of my visit note below.    HISTORY OF PRESENT ILLNESS  Ms. Muir is a pleasant 68 year old female following up with right knee osteoarthritis.  Holly last saw me on May 4, I aspirated her knee at that visit.  This did help her for a short amount of time, although her knee swelling has returned and she is in quite a bit of pain.  She has an appointment with Dr. Sheehan immediately following her visit, fortunately she was able to get her hemoglobin A1c down to 8.1.     PHYSICAL EXAM  General  - normal appearance, in no obvious distress  Musculoskeletal - right knee  - stance: antalgic gait, requires assistance to get up and down from wheelchair  - inspection: 1+ effusion  - palpation: generalized tenderness  - ROM: painful active ROM  Neuro  - no sensory or motor deficit, grossly normal coordination, normal muscle tone       ASSESSMENT & PLAN  Ms. Muir is a 68 year old female following up with right knee osteoarthritis.    Through shared decision making we did decide to repeat her aspiration today.  She does have a visit with Dr. Sheehan now to discuss potential knee arthroplasty.  I do think her recent pulmonary embolus will be a factor.    It was a pleasure seeing Holly.        Santy Harrington, , [facility]      This note was constructed using Dragon dictation software, please excuse any minor errors in spelling, grammar, or syntax.        **Holly and I briefly followed up after her visit with Dr. Sheehan.  The surgical decision is being postponed until her duration of anticoagulation therapy can be rectified.  I did inject her knee today with corticosteroid.**    Large Joint Injection/Arthocentesis: R knee joint    Date/Time: 6/6/2023 10:37 AM    Performed by: Len  Santy Carslon DO  Authorized by: Santy Harrington DO    Indications:  Pain  Needle Size:  22 G  Guidance: landmark guided    Approach:  Lateral  Location:  Knee      Medications:  40 mg triamcinolone 40 MG/ML  Outcome:  Tolerated well, no immediate complications  Procedure discussed: discussed risks, benefits, and alternatives    Consent Given by:  Patient  Timeout: timeout called immediately prior to procedure    Prep: patient was prepped and draped in usual sterile fashion       PROCEDURE    Knee Injection - Intraarticular  The patient was informed of the risks and the benefits of the procedure and a written consent was signed.  The patient s right knee was prepped with chlorhexidine in sterile fashion.   40 mg of triamcinolone suspension was drawn up into a 5 mL syringe with 4 mL of 1% lidocaine.  Injection was performed using substerile technique.  A 1.5-inch 22-gauge needle was used to enter the lateral aspect of the knee.  Injection performed successfully without difficulty.  There were no complications. The patient tolerated the procedure well. There was negligible bleeding.                 Again, thank you for allowing me to participate in the care of your patient.        Sincerely,        Santy Harrington DO

## 2023-06-06 NOTE — TELEPHONE ENCOUNTER
RN called Nelli with [facility] and left detailed message of provider approval of home care order below. Left clinic number [phone] for any further questions or concerns.    EVELYN Rajput  [facility] Primary Care Triage

## 2023-06-06 NOTE — LETTER
6/6/2023         RE: Holly Muir  [id number] [facility] [id number]        Dear Colleague,    Thank you for referring your patient, Holly Muir, to the [facility]. Please see a copy of my visit note below.    Chief Complaint: RECHECK (Discuss RTKA - got A1C levels lower. )         HPI: Holly Muir returns today in follow-up for her knees.  She reports that she continues to be very symptomatic and is here hoping to discuss total knee for both right and left.  Unfortunately she reports that she also recently had a pulmonary embolus and is on Eliquis.    Hemoglobin A1c recheck: 8.1%.  She reports she is about to start physical therapy    .  This was intended to act as a prerehab for total knee.    Medications and allergies are documented in the EMR and have been reviewed.      Current Outpatient Medications:      apixaban ANTICOAGULANT (ELIQUIS) 5 MG tablet, Take 1 tablet (5 mg) by mouth 2 times daily, Disp: 60 tablet, Rfl: 2     ARIPiprazole (ABILIFY) 15 MG tablet, Take 15 mg by mouth At Bedtime, Disp: , Rfl:      blood glucose (NO BRAND SPECIFIED) test strip, Use to test blood sugar 4 times daily or as directed. accu check guide strips, Disp: 360 strip, Rfl: 3     blood glucose monitoring (NO BRAND SPECIFIED) meter device kit, Use to test blood sugar 4  times daily or as directed. Accuceck Guide meter, Disp: 1 kit, Rfl: 1     blood glucose monitoring (ULTRA THIN 30G) lancets, Use to test blood sugar 4  times daily or as directed.lancets for acchu check guide, Disp: 400 each, Rfl: 3     buPROPion (WELLBUTRIN XL) 150 MG 24 hr tablet, Take 150 mg by mouth daily, Disp: , Rfl:      busPIRone HCl (BUSPAR) 30 MG tablet, TAKE 1 TABLET BY MOUTH TWICE A DAY, Disp: 60 tablet, Rfl: 5     cetirizine (ZYRTEC) 10 MG tablet, TAKE 1 TABLET BY MOUTH EVERY DAY, Disp: 90 tablet, Rfl: 1     Continuous Blood Gluc  (FREESTYLE CM 2 READER) GALINA, 1 Dose continuous, Disp: 1  each, Rfl: 0     Continuous Blood Gluc Sensor (FREESTYLE CM 2 SENSOR) MISC, 1 Dose continuous, Disp: 9 each, Rfl: 3     [employer]S ACETAMINOPHEN EX  MG tablet, TAKE 2 TABLETS BY MOUTH EVERY 8 HOURS. MAX ACETAMINOPHEN DOSE: 4000MG IN 24 HRS., Disp: 180 tablet, Rfl: 1     CVS ASPIRIN ADULT LOW DOSE 81 MG chewable tablet, TAKE 1 TABLET BY MOUTH EVERY DAY, Disp: 90 tablet, Rfl: 0     ferrous gluconate (FERGON) 324 (38 Fe) MG tablet, TAKE 1 TABLET (324 MG) BY MOUTH DAILY (WITH BREAKFAST), Disp: 90 tablet, Rfl: 3     gabapentin (NEURONTIN) 600 MG tablet, TAKE 2 TABLETS (1,200 MG) BY MOUTH 3 TIMES DAILY FOR 30 DAYS, Disp: 180 tablet, Rfl: 5     glimepiride (AMARYL) 2 MG tablet, Take 2 tablets (4 mg) by mouth 2 times daily (before meals), Disp: 180 tablet, Rfl: 1     insulin glargine (LANTUS SOLOSTAR) 100 UNIT/ML pen, Inject 45 units at bedtime + 2 units for priming of pen, Disp: 45 mL, Rfl: 1     insulin pen needle (31G X 5 MM) 31G X 5 MM miscellaneous, Use four times 4 day times a day, Disp: 200 each, Rfl: 3     metFORMIN (GLUCOPHAGE XR) 500 MG 24 hr tablet, Take two tablets with breakfast and one tablet with dinner for one week then increase to two tablets with breakfast and two tablets with dinner thereafter, Disp: 360 tablet, Rfl: 1     Multiple Vitamin (DAILY-ISAAC MULTIVITAMIN) TABS, TAKE 1 TABLET BY MOUTH EVERY DAY, Disp: 90 tablet, Rfl: 0     naloxone (NARCAN) 4 MG/0.1ML nasal spray, Spray 1 spray (4 mg) into one nostril alternating nostrils once as needed for opioid reversal every 2-3 minutes until assistance arrives, Disp: 0.2 mL, Rfl: 0     nystatin (MYCOSTATIN) 684405 UNIT/GM external powder, Apply 1 dose topically 3 times daily as needed, Disp: 60 g, Rfl: 3     omeprazole (PRILOSEC) 20 MG DR capsule, TAKE 2 CAPSULES (40 MG) BY MOUTH DAILY *TAKE 30-60 MINUTES BEFORE A MEAL*, Disp: 180 capsule, Rfl: 0     oxyCODONE (ROXICODONE) 5 MG tablet, Take 1 tablet (5 mg) by mouth every 6 hours as needed for pain, Disp:  60 tablet, Rfl: 0     PARoxetine (PAXIL) 20 MG tablet, TAKE 2 TABLETS AT BEDTIME (Prescribed by Dr. Isael Jim[facility]nez at [facility]), Disp: 30 tablet, Rfl:      pioglitazone (ACTOS) 45 MG tablet, Take 1 tablet (45 mg) by mouth daily, Disp: 90 tablet, Rfl: 3     propranolol ER (INDERAL LA) 80 MG 24 hr capsule, TAKE 1 CAPSULE BY MOUTH EVERY DAY, Disp: 90 capsule, Rfl: 2     ramipril (ALTACE) 5 MG capsule, TAKE 1 CAPSULE BY MOUTH EVERY DAY, Disp: 90 capsule, Rfl: 2     rOPINIRole (REQUIP) 0.25 MG tablet, TAKE 1 TABLET (0.25 MG) BY MOUTH EVERY EVENING, Disp: 90 tablet, Rfl: 2     simvastatin (ZOCOR) 20 MG tablet, TAKE 1 TABLET BY MOUTH EVERYDAY AT BEDTIME, Disp: 90 tablet, Rfl: 2     tiZANidine (ZANAFLEX) 2 MG tablet, Take 1 tablet (2 mg) by mouth 3 times daily as needed for muscle spasms, Disp: 90 tablet, Rfl: 1     traZODone (DESYREL) 50 MG tablet, Take 1.5 tablets (75 mg) by mouth At Bedtime, Disp: , Rfl:      vitamin C (CVS VITAMIN C) 500 MG tablet, 1/2 TABLET BY MOUTH JACOBSON WITH IRON SUPPLEMENT BEFORE MEAL, Disp: 45 tablet, Rfl: 3     VITAMIN D3 25 MCG (1000 UT) tablet, TAKE 1 TABLET BY MOUTH EVERY DAY, Disp: 90 tablet, Rfl: 3    Current Facility-Administered Medications:      lidocaine 1 % injection 2 mL, 2 mL, , , Christopher Sanderson, DO, 2 mL at 11/19/22 1030     triamcinolone (KENALOG-40) injection 40 mg, 40 mg, , , Santy Harrington DO, 40 mg at 02/27/23 1112     triamcinolone (KENALOG-40) injection 40 mg, 40 mg, , , Christopher Sanderson, DO, 40 mg at 11/19/22 1030     triamcinolone (KENALOG-40) injection 40 mg, 40 mg, , , Santy Harrington DO, 40 mg at 11/10/22 1401     triamcinolone (KENALOG-40) injection 40 mg, 40 mg, , , Santy Harrington DO, 40 mg at 11/10/22 1401    Allergies: Morphine sulfate    Current Status:  JOSE LUIS Jr:  [facility]:  Global Mental Health Score - (P) 7  Global Physical Health Score - (P) 6  PROMIS TOTAL - SUBSCORES - (P) 13    Physical Exam:  On physical examination the patient  appears the stated age, is in no acute distress, affect is appropriate, and breathing is non-labored.    There were no vitals taken for this visit.  There is no height or weight on file to calculate BMI.    She presents in a wheelchair from clinic.  Appearance: benign  Clinical alignment: Neutral  Effusion: No  Extension: 10  Flexion: 80  Recent pulmonary embolus    Assessment and plan:.  On Eliquis.  Currently not appropriate for total knee.  We discussed it would be 6 to 12 months before we would want to go forward with a knee replacement.  She would like to see hematology to be evaluated for risk factors.  No ref. provider found      Again, thank you for allowing me to participate in the care of your patient.        Sincerely,        Sanford Sheehan MD

## 2023-06-07 ENCOUNTER — TELEPHONE (OUTPATIENT)
Dept: FAMILY MEDICINE | Facility: CLINIC | Age: 68
End: 2023-06-07

## 2023-06-07 ENCOUNTER — MEDICAL CORRESPONDENCE (OUTPATIENT)
Dept: HEALTH INFORMATION MANAGEMENT | Facility: CLINIC | Age: 68
End: 2023-06-07

## 2023-06-07 ENCOUNTER — OFFICE VISIT (OUTPATIENT)
Dept: FAMILY MEDICINE | Facility: CLINIC | Age: 68
End: 2023-06-07
Payer: COMMERCIAL

## 2023-06-07 VITALS
RESPIRATION RATE: 16 BRPM | DIASTOLIC BLOOD PRESSURE: 68 MMHG | OXYGEN SATURATION: 100 % | TEMPERATURE: 98.8 F | WEIGHT: 191.6 LBS | SYSTOLIC BLOOD PRESSURE: 116 MMHG | HEART RATE: 74 BPM | HEIGHT: 62 IN | BODY MASS INDEX: 35.26 KG/M2

## 2023-06-07 DIAGNOSIS — Z79.01 ENCOUNTER FOR MONITORING DIRECT ORAL ANTICOAGULANT THERAPY: ICD-10-CM

## 2023-06-07 DIAGNOSIS — M25.561 CHRONIC PAIN OF RIGHT KNEE: ICD-10-CM

## 2023-06-07 DIAGNOSIS — F41.1 GAD (GENERALIZED ANXIETY DISORDER): ICD-10-CM

## 2023-06-07 DIAGNOSIS — E87.1 HYPONATREMIA: ICD-10-CM

## 2023-06-07 DIAGNOSIS — I26.93 SINGLE SUBSEGMENTAL PULMONARY EMBOLISM WITHOUT ACUTE COR PULMONALE (H): ICD-10-CM

## 2023-06-07 DIAGNOSIS — Z53.9 DIAGNOSIS NOT YET DEFINED: Primary | ICD-10-CM

## 2023-06-07 DIAGNOSIS — M17.11 PRIMARY OSTEOARTHRITIS OF RIGHT KNEE: ICD-10-CM

## 2023-06-07 DIAGNOSIS — Z09 HOSPITAL DISCHARGE FOLLOW-UP: Primary | ICD-10-CM

## 2023-06-07 DIAGNOSIS — F11.90 OPIOID USE DISORDER: ICD-10-CM

## 2023-06-07 DIAGNOSIS — Z51.81 ENCOUNTER FOR MONITORING DIRECT ORAL ANTICOAGULANT THERAPY: ICD-10-CM

## 2023-06-07 DIAGNOSIS — G89.29 CHRONIC PAIN OF RIGHT KNEE: ICD-10-CM

## 2023-06-07 PROCEDURE — 36415 COLL VENOUS BLD VENIPUNCTURE: CPT | Performed by: INTERNAL MEDICINE

## 2023-06-07 PROCEDURE — G0180 MD CERTIFICATION HHA PATIENT: HCPCS | Performed by: INTERNAL MEDICINE

## 2023-06-07 PROCEDURE — 99215 OFFICE O/P EST HI 40 MIN: CPT | Performed by: INTERNAL MEDICINE

## 2023-06-07 PROCEDURE — 80048 BASIC METABOLIC PNL TOTAL CA: CPT | Performed by: INTERNAL MEDICINE

## 2023-06-07 RX ORDER — BUPROPION HYDROCHLORIDE 200 MG/1
1 TABLET, EXTENDED RELEASE ORAL EVERY EVENING
COMMUNITY
Start: 2023-02-10 | End: 2023-11-21

## 2023-06-07 RX ORDER — TIZANIDINE 2 MG/1
2 TABLET ORAL
COMMUNITY
Start: 2023-05-10 | End: 2023-06-07 | Stop reason: DRUGHIGH

## 2023-06-07 RX ORDER — BLOOD-GLUCOSE METER
EACH MISCELLANEOUS
COMMUNITY
Start: 2023-05-23

## 2023-06-07 RX ORDER — PAROXETINE 40 MG/1
1 TABLET, FILM COATED ORAL EVERY EVENING
COMMUNITY
Start: 2023-03-28 | End: 2023-11-21

## 2023-06-07 ASSESSMENT — ANXIETY QUESTIONNAIRES
IF YOU CHECKED OFF ANY PROBLEMS ON THIS QUESTIONNAIRE, HOW DIFFICULT HAVE THESE PROBLEMS MADE IT FOR YOU TO DO YOUR WORK, TAKE CARE OF THINGS AT HOME, OR GET ALONG WITH OTHER PEOPLE: EXTREMELY DIFFICULT
GAD7 TOTAL SCORE: 17
4. TROUBLE RELAXING: MORE THAN HALF THE DAYS
7. FEELING AFRAID AS IF SOMETHING AWFUL MIGHT HAPPEN: MORE THAN HALF THE DAYS
6. BECOMING EASILY ANNOYED OR IRRITABLE: MORE THAN HALF THE DAYS
8. IF YOU CHECKED OFF ANY PROBLEMS, HOW DIFFICULT HAVE THESE MADE IT FOR YOU TO DO YOUR WORK, TAKE CARE OF THINGS AT HOME, OR GET ALONG WITH OTHER PEOPLE?: EXTREMELY DIFFICULT
3. WORRYING TOO MUCH ABOUT DIFFERENT THINGS: NEARLY EVERY DAY
7. FEELING AFRAID AS IF SOMETHING AWFUL MIGHT HAPPEN: MORE THAN HALF THE DAYS
1. FEELING NERVOUS, ANXIOUS, OR ON EDGE: NEARLY EVERY DAY
5. BEING SO RESTLESS THAT IT IS HARD TO SIT STILL: MORE THAN HALF THE DAYS
GAD7 TOTAL SCORE: 17
2. NOT BEING ABLE TO STOP OR CONTROL WORRYING: NEARLY EVERY DAY

## 2023-06-07 ASSESSMENT — PAIN SCALES - GENERAL: PAINLEVEL: EXTREME PAIN (8)

## 2023-06-07 NOTE — PATIENT INSTRUCTIONS
At [facility], we strive to deliver an exceptional experience to you, every time we see you. If you receive a survey, please complete it as we do value your feedback.  If you have MyChart, you can expect to receive results automatically within 24 hours of their completion.  Your provider will send a note interpreting your results as well.   If you do not have MyChart, you should receive your results in about a week by mail.    Your care team:                            Family Medicine Internal Medicine   MD Shekhar Walls MD Shantel Branch-Fleming, MD Srinivasa Vaka, MD Katya Belousova, PAKASIE Mckeon CNP, MD ([facility]   Rikki Ferris, MD Helene Willett MD Amelia Massimini APRN TRE Borrero APRN MD Dulce Galarza MD          Clinic hours: Monday - Thursday 7 am-6 pm; Fridays 7 am-5 pm.   Urgent care: Monday - Friday 10 am- 8 pm; Saturday and Sunday 9 am-5 pm.    Clinic: [phone]       [facility]: Monday - Thursday 8 am - 7 pm; Friday 8 am - 6 pm  [facility]: [phone]

## 2023-06-07 NOTE — PROGRESS NOTES
Assessment & Plan     Holly was seen today for hospital f/u.    Diagnoses and all orders for this visit:    Hospital discharge follow-up    Single subsegmental pulmonary embolism without acute cor pulmonale (H)  Comments:  Refer to hematology for recommendation on duration of anticoagulation and timing of surgery.  Orders:  -     Adult Oncology/Hematology  Referral; Future    Encounter for monitoring direct oral anticoagulant therapy    Hyponatremia  -     Basic metabolic panel  (Ca, Cl, CO2, Creat, Gluc, K, Na, BUN); Future  -     Basic metabolic panel  (Ca, Cl, CO2, Creat, Gluc, K, Na, BUN)  -     Basic metabolic panel  (Ca, Cl, CO2, Creat, Gluc, K, Na, BUN); Future    ISABELA (generalized anxiety disorder)  Comments:  Patient to follow-up with psychiatry for management of anxiety    Opioid use disorder  Comments:  Continue oxycodone 3 times a day as needed.  Discussed hypersensitivity associated with chronic opioid use.    Primary osteoarthritis of right knee    Chronic pain of right knee    Opioid use disorder      [id number]}   MED REC REQUIRED  Post Medication Reconciliation Status: discharge medications reconciled and changed, per note/orders    Check sodium level today.  Keep her appointment in mid July.  Continue to follow with endocrine diabetes management.  She has appointment coming up in July.    Continue with oxycodone 2 times a day and gabapentin 3 times a day.    I spent a total of 42 minutes on the day of the visit.   doing chart review, history and exam, documentation and further activities as noted above      Tiffany King MD PhD  [facility]    Rebeka Barrera is a 68 year old, presenting for the following health issues:  Hospital F/U  Patient went to hospital due to accidental overdose.  Found to have profound hyponatremia with sodium 120.  Has also had progressive memory loss confusion.  She is monitored hospital, cortisol at 132.  She was given fluid striction to  48 ounces a day.        6/7/2023    10:22 AM   Additional Questions   Roomed by jeronimo     History of Present Illness       Mental Health Follow-up:  Patient presents to follow-up on Depression & Anxiety.Patient's depression since last visit has been:  No change  The patient is having other symptoms associated with depression.  Patient's anxiety since last visit has been:  Medium  The patient is having other symptoms associated with anxiety.  Any significant life events: other  Patient is feeling anxious or having panic attacks.  Patient has no concerns about alcohol or drug use.    Diabetes:   She presents for follow up of diabetes.  She is checking home blood glucose with a continuous glucose monitor.  She checks blood glucose before and after meals.  Blood glucose is sometimes over 200 and never under 70. She is aware of hypoglycemia symptoms including shakiness, dizziness, weakness, lethargy, blurred vision and confusion. She is concerned about other. She is having numbness in feet, burning in feet, excessive thirst, blurry vision and weight loss.         Hypertension: She presents for follow up of hypertension.  She does not check blood pressure  regularly outside of the clinic. Outpatient blood pressures have not been over 140/90. She does not follow a low salt diet.     She eats 2-3 servings of fruits and vegetables daily.She consumes 0 sweetened beverage(s) daily.She exercises with enough effort to increase her heart rate 9 or less minutes per day.  She exercises with enough effort to increase her heart rate 3 or less days per week.   She is taking medications regularly.  Today's ISABELA-7 Score: 17     Patient came with grandson, her daughter was on speaker phone for the visit.      Hospital Follow-up Visit:    Hospital/Nursing Home/ Rehab Facility: [facility]  Date of Admission: 6/1/2023  Date of Discharge: 6/3/2023  Reason(s) for Admission: Acute hyponatremia      Was your  "hospitalization related to COVID-19? No   Problems taking medications regularly:  yes  Medication changes since discharge: None  Problems adhering to non-medication therapy:  None    Summary of hospitalization:  CareEverywhere information obtained and reviewed  Diagnostic Tests/Treatments reviewed.  Follow up needed: none  Other Healthcare Providers Involved in Patient s Care:         Surgical follow-up appointment -orthopedics  Update since discharge: improved.     Plan of care communicated with patient     As instructed hospital, patient started taking over her medications as well as walking up pain medication oxycodone.  In the past patient has had taken extra doses of oxycodone for pain.      There is plan for knee replacement.  Orthopedics wants her A1c to be below 7.0.  Patient follows with endocrine for diabetes management.  She had arthrocentesis yesterday.  Patient feels some improvement in the pain.  Currently she takes oxycodone 3 times a day and gabapentin 3 times a day.    Fluids: 16 oz cup, 3 cups a day.    Since the injection, glucose ranges 245-318.  Daughter has tried giving her as needed regular insulin, did not bring glucose down.      Patient also had a hospitalization in early May where she hyponatremia pneumonia as well as acute PE.  This was thought to be due to sedentary lifestyle.  She has been on Xarelto since May.  Surgeon has concern regarding the surgery and is considering referring 6 months to a year.        5/24/2022     9:25 AM 5/18/2023    10:20 AM 6/7/2023    10:08 AM   ISABELA-7 SCORE   Total Score  2 (minimal anxiety) 17 (severe anxiety)   Total Score 17 2 17             Objective    /68 (BP Location: Left arm, Patient Position: Sitting, Cuff Size: Adult Regular)   Pulse 74   Temp 98.8  F (37.1  C) (Oral)   Resp 16   Ht 1.575 m (5' 2\")   Wt 86.9 kg (191 lb 9.6 oz)   SpO2 100%   BMI 35.04 kg/m    Body mass index is 35.04 kg/m .  Physical Exam   GENERAL APPEARANCE: healthy, " alert and no distress    Results for orders placed or performed in visit on 06/07/23   Basic metabolic panel  (Ca, Cl, CO2, Creat, Gluc, K, Na, BUN)     Status: Abnormal   Result Value Ref Range    Sodium 134 (L) 136 - 145 mmol/L    Potassium 5.0 3.4 - 5.3 mmol/L    Chloride 97 (L) 98 - 107 mmol/L    Carbon Dioxide (CO2) 23 22 - 29 mmol/L    Anion Gap 14 7 - 15 mmol/L    Urea Nitrogen 23.3 (H) 8.0 - 23.0 mg/dL    Creatinine 0.82 0.51 - 0.95 mg/dL    Calcium 9.9 8.8 - 10.2 mg/dL    Glucose 211 (H) 70 - 99 mg/dL    GFR Estimate 78 >60 mL/min/1.73m2

## 2023-06-07 NOTE — TELEPHONE ENCOUNTER
Forms/Letter Request    Type of form/letter: [facility] Order [id number]    Have you been seen for this request: N/A    Do we have the form/letter: Yes: Will place form on providers desk for review/signature    When is form/letter needed by: asap    How would you like the form/letter returned: Fax : [phone]

## 2023-06-07 NOTE — TELEPHONE ENCOUNTER
Dr Tiffany King is at the [facility] today. Faxed Home Health Certification Plan of Care to [facility], [phone], right fax confirmed at 11:29 am today, 6/7/2023. Copy to TC and abstracting.  Carey Hsu MA  [facility]   Primary Care

## 2023-06-07 NOTE — TELEPHONE ENCOUNTER
Order [id number] signed and faxed to [facility] [phone]. Copy in abstract and original in tc bin.

## 2023-06-08 ENCOUNTER — TELEPHONE (OUTPATIENT)
Dept: FAMILY MEDICINE | Facility: CLINIC | Age: 68
End: 2023-06-08
Payer: COMMERCIAL

## 2023-06-08 LAB
ANION GAP SERPL CALCULATED.3IONS-SCNC: 14 MMOL/L (ref 7–15)
BUN SERPL-MCNC: 23.3 MG/DL (ref 8–23)
CALCIUM SERPL-MCNC: 9.9 MG/DL (ref 8.8–10.2)
CHLORIDE SERPL-SCNC: 97 MMOL/L (ref 98–107)
CREAT SERPL-MCNC: 0.82 MG/DL (ref 0.51–0.95)
DEPRECATED HCO3 PLAS-SCNC: 23 MMOL/L (ref 22–29)
GFR SERPL CREATININE-BSD FRML MDRD: 78 ML/MIN/1.73M2
GLUCOSE SERPL-MCNC: 211 MG/DL (ref 70–99)
POTASSIUM SERPL-SCNC: 5 MMOL/L (ref 3.4–5.3)
SODIUM SERPL-SCNC: 134 MMOL/L (ref 136–145)

## 2023-06-08 NOTE — TELEPHONE ENCOUNTER
Home Care is calling regarding an established patient with [facility].        6/8/2023   Home Care Information   Date of Home Care episode start 6/8/2023   Current following provider Dr. Tiffany King    Name/Phone Number Greg (RN Case Manager), [phone]   Home Care agency [facility]        Requesting orders from: Tiffany King  Provider is following patient: Yes  Is this a 60-day recertification request?  Yes    Orders Requested    Skilled Nursing  Request for recertification as patient was recently hospitalized   Frequency: 1x/wk for 5 wks for disease management r/t osteoarthritis and diabetes     Confirmed ok to leave a detailed message with call back.  Contact information confirmed and updated as needed.    EVELYN Rajput  [facility] Primary Care Triage

## 2023-06-10 RX ORDER — OXYCODONE HYDROCHLORIDE 5 MG/1
5 TABLET ORAL 3 TIMES DAILY PRN
Qty: 60 TABLET | Refills: 0 | COMMUNITY
Start: 2023-06-10 | End: 2023-06-15

## 2023-06-10 ASSESSMENT — ANXIETY QUESTIONNAIRES: GAD7 TOTAL SCORE: 17

## 2023-06-12 ENCOUNTER — TELEPHONE (OUTPATIENT)
Dept: PHARMACY | Facility: CLINIC | Age: 68
End: 2023-06-12
Payer: COMMERCIAL

## 2023-06-12 NOTE — TELEPHONE ENCOUNTER
RN called number for [facility] and voicemail stated the line belonged to someone else. RN did not leave message.     RN called number on file for home care nurse Stacey and relayed provider approval of home care orders below. Stacey verbalized good understanding. No further questions or concerns.    EVELYN Rajput  [facility] Primary Care Triage   Yes

## 2023-06-12 NOTE — TELEPHONE ENCOUNTER
Called patient to schedule an MTM visit. Patient is scheduled for 7/5.  Liz Calero, Pharm.D, [facility]  Medication Therapy Management Pharmacist

## 2023-06-13 ENCOUNTER — TELEPHONE (OUTPATIENT)
Dept: FAMILY MEDICINE | Facility: CLINIC | Age: 68
End: 2023-06-13
Payer: COMMERCIAL

## 2023-06-13 NOTE — TELEPHONE ENCOUNTER
Forms/Letter Request    Type of form/letter: [facility] Order [id number]    Have you been seen for this request: N/A    Do we have the form/letter: Yes: Will place form on providers desk for review/signature    When is form/letter needed by: asap    How would you like the form/letter returned: Fax : [phone]

## 2023-06-14 ENCOUNTER — MEDICAL CORRESPONDENCE (OUTPATIENT)
Dept: HEALTH INFORMATION MANAGEMENT | Facility: CLINIC | Age: 68
End: 2023-06-14
Payer: COMMERCIAL

## 2023-06-15 DIAGNOSIS — M17.11 PRIMARY OSTEOARTHRITIS OF RIGHT KNEE: ICD-10-CM

## 2023-06-15 DIAGNOSIS — M25.561 CHRONIC PAIN OF RIGHT KNEE: ICD-10-CM

## 2023-06-15 DIAGNOSIS — F11.90 OPIOID USE DISORDER: ICD-10-CM

## 2023-06-15 DIAGNOSIS — G89.29 CHRONIC PAIN OF RIGHT KNEE: ICD-10-CM

## 2023-06-15 RX ORDER — OXYCODONE HYDROCHLORIDE 5 MG/1
5 TABLET ORAL 3 TIMES DAILY PRN
Qty: 90 TABLET | Refills: 0 | Status: SHIPPED | OUTPATIENT
Start: 2023-06-15 | End: 2023-07-18

## 2023-06-15 NOTE — TELEPHONE ENCOUNTER
"Pt requesting refill of oxycodone, only has 1 tablet left. Per chart review, note to pharmacy on prescription from 6/10/23 states \"dose changed, Prescription not sent.\"    Routing to provider.     Betsy Mcconnell RN  "

## 2023-06-19 NOTE — TELEPHONE ENCOUNTER
Patient calling back regarding Oxycodone refill. She is currently out of the medication and in pain. RN notes Rx was sent on 6/15.     RN called [facility] pharmacy to check on the status of Rx. Pharmacy staff states that it is ready for pick. Patient updated. No further questions or concerns.    EVELYN Rajput  [facility] Primary Care Triage

## 2023-06-20 ENCOUNTER — PATIENT OUTREACH (OUTPATIENT)
Dept: GERIATRIC MEDICINE | Facility: CLINIC | Age: 68
End: 2023-06-20
Payer: COMMERCIAL

## 2023-06-20 NOTE — PROGRESS NOTES
TRANSITIONS OF CARE (TASNEEM) LOG   TASNEEM tasks should be completed by the CC within one (1) business day of notification of each transition. Follow up contact with member is required after return to their usual care setting.  Note:  If CC finds out about the transitions fifteen (15) days or more after the member has returned to their usual care setting, no TASNEEM log is needed. However, the CC should check in with the member to discuss the transition process, any changes needed to the care plan and document it in a case note.    Member Name:  Holly Muir O [place]:  [employer] [facility] [place] Member ID#: [id number]   [place] [facility] [place] Contact:  Malathi Bacon RN, PHN Agency/County/Care System: [facility]   Transition Communication Actions from Care Management Contact   Transition #1   Notification Date: 06/20/23 Transition Date:   06/01/23 Transition From: Home     Is this the member s usual care setting?               yes Transition To: [facility]   Transition Type:  Unplanned  Reason for Admission/Comments:Accidental medication overdose    Contact member/responsible party to offer assistance with transition Date completed: 06/20/2023    Notes from conversation with the member/responsible party, provider, discharging and receiving facility (as applicable):   Date 06/20/2023:NA- member had discharge home prior to Care Coordinator notification.      Shared CC contact info, care plan/services with receiving setting--Date completed: 06/20/23   Name & Title of receiving setting contact: NA- member had d/c home prior to notification   Notified PCP of transition--Date completed:  06/20/23     via  EMR  Name of PCP: Tiffany King     Transition #2   Notification Date: 06/20/2023 Transition Date:   06/03/23 Transition From: [facility]     Is this the member s usual care setting?               no Transition To: Home   Transition Type:  Planned  Reason for  Admission/Comments: Discharge home    Contact member/responsible party to offer assistance with transition Date completed: 06/20/2023    Notes from conversation with the member/responsible party, provider, discharging and receiving facility (as applicable):   Date 06/20/2023:CC contacted member and reviewed discharge summary.  Member has a follow-up appointment with PCP in 7 days: Yes: scheduled on 06/07/2023   Member has had a change in condition: No  Home visit needed: No  Care plan reviewed and updated.  The following home based services PCA were resumed.  New referrals placed: Yes: Therapies: PT with [facility]  Transition log completed.   PCP, Tiffany King, notified of transition back to home via EMR.       Shared CC contact info, care plan/services with receiving setting--Date completed: 06/20/2023   Name & Title of receiving setting contact: NA spoke with member.    Notified PCP of transition--Date completed:  06/20/23     via  EMR  Name of PCP: Tiffany King      *RETURN TO USUAL CARE SETTING: *Complete tasks below when the member is discharging TO their usual care setting within one (1) business day of notification..      For situations where the Care Coordinator is notified of the discharge prior to the date of discharge, the Care Coordinator must follow up with the member or designated representative to confirm that discharge actually occurred and discuss required TASNEEM tasks as outlined in the TASNEEM Instructions.  (This includes situations where it may be a  new  usual care setting for the member. (i.e., a community member who decides upon permanent nursing home placement following hospitalization and rehab).    Discuss with Member/Responsible Party:    Check  Yes  - if the member, family member and/or SNF/facility staff manages the following:    If  No  provide explanation in the comments section.          Date completed: 06/20/2023 Communicated with member or their designated representative about the  following:  care transition process; about changes to the member s health status; plan of care updates; education about transitions and how to prevent unplanned transitions/readmissions    Four Pillars for Optimal Transition:    Check  Yes  - if the member, family member and/or SNF/facility staff manages the following:    If  No  provide explanation in the comments section.          [x]  Yes     []  No Does the member have a follow-up appointment scheduled with primary care or specialist? (Mental health hospitalizations--the appt. should be w/in 7 days)              For mental health hospitalizations:  []  Yes     []  No     Does the member have a follow-up appointment scheduled with a mental health practitioner within 7 days of discharge?  [x]  Yes     []  No     Has a medication review been completed with member? If no, refer to PCP, home care nurse, MTM, pharmacist  [x]  Yes     []  No     Can the member manage their medications or is there a system in place to manage medications (e.g. home care set-up)?         [x]  Yes     []  No     Can the member verbalize warning signs and symptoms to watch for and how to respond?  [x]  Yes     []  No     Does the member have a copy of and understand their discharge instructions?  If no, assist to obtain copy of discharge instructions, review discharge instructions, and assist to contact PCP to discuss questions about their recent hospitalization.  [x]  Yes     []  No     Does the member have adequate food, housing and transportation?  If no, add goal and discuss additional supports available to the member                                                                                                                                                                                 [x]  Yes     []  No     Is the member safe in their home?  If no, document needs and support provided                                                                                                                                                                           []  Yes     [x]  No     Are there any concerns of vulnerability, abuse, or neglect?  If yes, document concerns and actions taken by Care Coordinator as a mandated                                                                                                                                                                              [x]  Yes     []  No     Does the member use a Personal Health Care Record?  Check  Yes  if visit summary, discharge summary, and/or healthcare summary are being used as a PHR.                                                                                                                                                                                  [x]  Yes     []  No     Have you reviewed the discharge summary with the member? If  No  provide explanation in comments.  [x]  Yes     []  No     Have you updated the member s care plan/support plan? Add new diagnosis, medications, treatments, goals & interventions, as applicable. If No, provide explanation in comments.    Comments:           Notes from conversation with the member/responsible party, provider, discharging and receiving facility (as applicable): CC contacted CADI waiver  Agnieszka LÓPEZ and reviewed discharge summary.

## 2023-06-20 NOTE — PROGRESS NOTES
[facility] Care Coordination Contact      [facility] Six-Month Telephone Assessment    6 month telephone assessment completed on 06/20/2023.    ER visits: Yes -  several ER visits, see EMR  Hospitalizations: Yes -  several admissions, see EMR  TCU stays: No  Significant health status changes: no, member has uncontrolled pain and poor medication management despite having a medication machine paid for by CADI waiver.   Falls/Injuries: No  ADL/IADL changes: No  Changes in services: No- added short term PT following 06/01-06/03 hospitalization.     Caregiver Assessment follow up:  NA    Goals: See POC in chart for goal progress documentation.      Will see member in 6 months for an annual health risk assessment.   Encouraged member to call CC with any questions or concerns in the meantime.       Malathi Bacon RN PHN  [facility]  [phone]

## 2023-06-23 ENCOUNTER — TELEPHONE (OUTPATIENT)
Dept: FAMILY MEDICINE | Facility: CLINIC | Age: 68
End: 2023-06-23
Payer: COMMERCIAL

## 2023-06-23 NOTE — TELEPHONE ENCOUNTER
Forms/Letter Request    Type of form/letter: [facility] forms need to be signed.     Have you been seen for this request: N/A    Do we have the form/letter: Yes:     Who is the form from? Home care    Where did/will the form come from? form was faxed in    When is form/letter needed by: asap    How would you like the form/letter returned: Fax : [phone]

## 2023-06-27 ENCOUNTER — MYC REFILL (OUTPATIENT)
Dept: FAMILY MEDICINE | Facility: CLINIC | Age: 68
End: 2023-06-27
Payer: COMMERCIAL

## 2023-06-27 NOTE — PROGRESS NOTES
Outcome for 06/27/23 7:44 AM: Data uploaded on [employer]  Alejandra Duvall MA  Outcome for 07/03/23 3:01 PM: Data obtained via [employer] website  Malathi Mccarty MA       Patient is showing 4/5 MNCM met. A1c not in range   Alejandra Duvall MA

## 2023-06-28 ENCOUNTER — MEDICAL CORRESPONDENCE (OUTPATIENT)
Dept: HEALTH INFORMATION MANAGEMENT | Facility: CLINIC | Age: 68
End: 2023-06-28
Payer: COMMERCIAL

## 2023-06-29 NOTE — TELEPHONE ENCOUNTER
Called Pt regarding note Pt will call Psychiatrist regarding prescription.      Moraima ROCA Visit Facilitator

## 2023-06-29 NOTE — TELEPHONE ENCOUNTER
Patient gets antidepressant through his psychiatrist.  Please have patient contact her psychiatrist for refill.

## 2023-06-30 RX ORDER — BUPROPION HYDROCHLORIDE 200 MG/1
200 TABLET, EXTENDED RELEASE ORAL
Qty: 90 TABLET | Refills: 1 | OUTPATIENT
Start: 2023-06-30

## 2023-07-03 NOTE — PROGRESS NOTES
Medication Therapy Management (MTM) Encounter    ASSESSMENT:                            Medication Adherence/Access: See below for considerations    Type 2 Diabetes:  May benefit from GLP-1RA or SGLT-2 inhibitor. Patient to discuss with endocrinology at visit today.    Neuropathy: Continue to use walker. May     Osteoarthritis/chronic pain: May benefit from using diclofenac gel. New prescription sent.      Restless legs/Muscle spasms: Stable    Anemia: May benefit from updated iron studies    Supplements: May benefit from updated Vitamin D levels    PLAN:                          1. Prescription sent for diclofenac gel  2. Lab orders placed for updated iron and Vitamin D labs  3. Could consider GLP1-RA or SGLT-2 inhibitor. Patient to address with endocrinology today.    Follow-up: 1 month    SUBJECTIVE/OBJECTIVE:                          Holly Muir is a 68 year old female called for a follow-up visit.  Today's visit is a follow-up MTM visit from 5/25/2023.     Reason for visit:  Med review follow up    Allergies/ADRs: Reviewed in chart  Past Medical History: Reviewed in chart  Tobacco: She reports that she quit smoking about 47 years ago. Her smoking use included cigarettes. She started smoking about 48 years ago. She has never used smokeless tobacco.  Alcohol: not currently using  Lives between two daughters.     Medication Adherence/Access: daughters help manage medications. Uses a pill box. Patient is not very familiar with her medications.  Per patient, daughters hand her her medications to her.  Has timer on her phone to remind her when to take her insulin.  The patient fills medications at [facility]: NO, fills medications at [facility].    Type 2 Diabetes: Currently taking Lantus (gives around 9pm) 42 units if blood sugar >/=200 and 35 units if blood sugar <200. Has been having some lows. Had to call the ambulance for a low blood sugar. Had a high blood sugar and gave herself some Novolog (unsure of dose she gave)  but it caused her to go low.    Also taking metformin ER 1000 mg in the morning, 500 mg at night. Also taking glimepiride 4 mg 2 times daily and pioglitazone 45 mg once daily.   Has a follow up with endocrinology today after our visit.  Using freestyle mathieu and has testing supplies at home. Has alarms set to alert her when she is going low. Patient had 2 sensors that fell off or didn't work. Needs to call [employer] for replacement sensors.  Symptoms of high blood sugar? none  Eye exam: up to date  Foot exam: due  Diet/Exercise: family cooks.   Aspirin: Taking 81 mg daily and denies side effects.   Statin: Yes: simvastatin 20 mg daily   ACEi/ARB: Yes: ramipril 5 mg daily  Urine Albumin:   Lab Results   Component Value Date    UMALCR 40.70 (H) 04/27/2023      Lab Results   Component Value Date    A1C 8.7 04/26/2023    A1C 9.1 01/19/2023    A1C 8.5 07/14/2022    A1C 10.1 04/22/2022    A1C 8.9 11/12/2021    A1C 10.5 10/08/2021    A1C 7.7 01/06/2021    A1C 7.9 12/11/2020    A1C 10.6 08/24/2020    A1C 9.5 06/18/2020     Neuropathy:   Gabapentin 1200 mg 2 times daily. Unsure if this is helpful. Lately has been feeling dizzy/unbalanced. Has been using her walker.     Osteoarthritis/chronic pain:    Oxycodone 5 mg every three times daily as needed  Acetaminophen 1000 mg every 8 hours (max 4g per day)  Diclofenac gel-this helps a little but doesn't have any left at home.     If she doesn't take oxycodone every 6 hours the pain gets much worse  Is waiting for a knee replacement but needs to get her A1c to less than 7%.    Restless legs/Muscle spasms:   Tizanidine 2 mg 3 times daily as needed.   Ropinirole 0.25 mg once daily at night  Finds these helpful for her restless legs.    Anemia:   Ferrous gluconate 324 mg once daily plus vitamin C daily.  No concerns today  Ferritin   Date Value Ref Range Status   10/14/2020 40 8 - 252 ng/mL Final     Iron   Date Value Ref Range Status   10/14/2020 43 35 - 180 ug/dL Final     Iron Binding  Cap   Date Value Ref Range Status   10/14/2020 434 (H) 240 - 430 ug/dL Final     Supplements: Currently taking:   Vitamin D 1000 units once daily  No concerns today.  25 OH Vit D2   Date Value Ref Range Status   02/03/2015 <5 ug/L Final     25 OH Vit D3   Date Value Ref Range Status   02/03/2015 49 ug/L Final     25 OH Vit D total   Date Value Ref Range Status   02/03/2015  30 - 75 ug/L Final    <54  Season, race, dietary intake, and treatment affect the concentration of   25-hydroxy-Vitamin D. Values may decrease during winter months and increase   during summer months. Values less than 30 ug/L may indicate Vitamin D   deficiency.   This test was developed and its performance characteristics determined by the   [facility],  [facility]. It has   not been cleared or approved by the FDA. The laboratory is regulated under CLIA   as qualified to perform high-complexity testing. This test is used for clinical   purposes. It should not be regarded as investigational or for research.         Today's Vitals: There were no vitals taken for this visit.  ----------------  I spent 34 minutes with this patient today. All changes were made via collaborative practice agreement with Dr. King. A copy of the visit note was provided to the patient's provider(s).    A summary of these recommendations was sent via [employer].    Telemedicine Visit Details  Type of service:  Telephone visit  Start Time: 8:31AM  End Time: 9:05AM     Medication Therapy Recommendations  Primary osteoarthritis of right knee    Current Medication: diclofenac (VOLTAREN) 1 % topical gel   Rationale: Medication product not available - Adherence - Adherence   Recommendation: Provide Education   Status: Accepted per CPA

## 2023-07-05 ENCOUNTER — VIRTUAL VISIT (OUTPATIENT)
Dept: ENDOCRINOLOGY | Facility: CLINIC | Age: 68
End: 2023-07-05
Payer: COMMERCIAL

## 2023-07-05 ENCOUNTER — VIRTUAL VISIT (OUTPATIENT)
Dept: PHARMACY | Facility: CLINIC | Age: 68
End: 2023-07-05
Payer: COMMERCIAL

## 2023-07-05 DIAGNOSIS — M54.50 CHRONIC BILATERAL LOW BACK PAIN WITHOUT SCIATICA: ICD-10-CM

## 2023-07-05 DIAGNOSIS — E11.42 DIABETIC POLYNEUROPATHY ASSOCIATED WITH TYPE 2 DIABETES MELLITUS (H): ICD-10-CM

## 2023-07-05 DIAGNOSIS — G25.81 RESTLESS LEG SYNDROME: ICD-10-CM

## 2023-07-05 DIAGNOSIS — M62.838 MUSCLE SPASM: ICD-10-CM

## 2023-07-05 DIAGNOSIS — Z79.4 TYPE 2 DIABETES MELLITUS WITHOUT COMPLICATION, WITH LONG-TERM CURRENT USE OF INSULIN (H): Primary | ICD-10-CM

## 2023-07-05 DIAGNOSIS — E55.9 VITAMIN D DEFICIENCY: ICD-10-CM

## 2023-07-05 DIAGNOSIS — G89.29 CHRONIC BILATERAL LOW BACK PAIN WITHOUT SCIATICA: ICD-10-CM

## 2023-07-05 DIAGNOSIS — N18.31 ANEMIA DUE TO STAGE 3A CHRONIC KIDNEY DISEASE (H): ICD-10-CM

## 2023-07-05 DIAGNOSIS — Z79.4 TYPE 2 DIABETES MELLITUS WITH HYPERGLYCEMIA, WITH LONG-TERM CURRENT USE OF INSULIN (H): ICD-10-CM

## 2023-07-05 DIAGNOSIS — F32.A DEPRESSION, UNSPECIFIED DEPRESSION TYPE: ICD-10-CM

## 2023-07-05 DIAGNOSIS — D63.1 ANEMIA DUE TO STAGE 3A CHRONIC KIDNEY DISEASE (H): ICD-10-CM

## 2023-07-05 DIAGNOSIS — M17.11 PRIMARY OSTEOARTHRITIS OF RIGHT KNEE: Primary | ICD-10-CM

## 2023-07-05 DIAGNOSIS — E11.65 TYPE 2 DIABETES MELLITUS WITH HYPERGLYCEMIA, WITH LONG-TERM CURRENT USE OF INSULIN (H): ICD-10-CM

## 2023-07-05 DIAGNOSIS — E11.9 TYPE 2 DIABETES MELLITUS WITHOUT COMPLICATION, WITH LONG-TERM CURRENT USE OF INSULIN (H): Primary | ICD-10-CM

## 2023-07-05 PROCEDURE — 99443 PR PHYSICIAN TELEPHONE EVALUATION 21-30 MIN: CPT | Mod: 95 | Performed by: INTERNAL MEDICINE

## 2023-07-05 PROCEDURE — 99606 MTMS BY PHARM EST 15 MIN: CPT | Performed by: PHARMACIST

## 2023-07-05 PROCEDURE — 99607 MTMS BY PHARM ADDL 15 MIN: CPT | Performed by: PHARMACIST

## 2023-07-05 RX ORDER — INSULIN ASPART 100 [IU]/ML
INJECTION, SOLUTION INTRAVENOUS; SUBCUTANEOUS
COMMUNITY
End: 2024-05-13

## 2023-07-05 ASSESSMENT — PATIENT HEALTH QUESTIONNAIRE - PHQ9: SUM OF ALL RESPONSES TO PHQ QUESTIONS 1-9: 24

## 2023-07-05 NOTE — PROGRESS NOTES
Virtual Visit Details    Type of service:  Telephone Visit   Phone call duration: 22 minutes     Holly is a 67 year old who is being evaluated via a billable telephone visit.      What phone number would you like to be contacted at? [phone]    How would you like to obtain your AVS? Dinesh Duvall, SIENA                                                                 - Endocrinology Follow up -    Reason for visit/consult:  Uncontrolled type 2 diabetes mellitus with hyperglycemia, with long-term current use of insulin (H)    Primary care provider: Tiffany King      Assessment and Plan    # DM2, night time eating habits  A1c in 4/2023 was 8.4, but by reviewing CGM, average glucose 164, estimated A1C around 7.2, so it is excellent control currently.  Her daughter is taking care of her medications and she has psychiatry close follow up. She is compliant to Basaglar insulin.     - continue current Basaglar betweem 35-42 units night    -Continue current metformin 1000 mg bid (sje did not mention about diarrhea today)    - Continue current Actos 45 mg, body weight stable.     - Glimepiride 2 mg daily BID    - lab prior to next visit      # DM education  Her daughter wants to know more about diabetic foods.   They will make appt with Mikayla.     # Mental health/Depression      # DM device  Newly prescribed Libre2, she has hand tremors and difficult for check with glucometer    -RTC with Dr. Kelly Martinez  in 6 months at  site         30  minutes spent on the date of the encounter doing chart review, history and exam, documentation and further activities as noted above.    Elizabeth Medrano MD  Staff Physician  Endocrinology and Metabolism  [facility]  License: [id number]  Pager: [phone]      Current Diabetic Regimens:  Basaglar 35-42 units night   Metformin 1000 twice daily  Actos 45 mg daily  Glimepiride 2 mg BID    Life Style:  Wake up 10-11 am  Breakfast after  Lunch 1-2 pm: sandwich  Dinner 6  pm: mexican fat foods  Bedtime: 9 pm  3 am : wake up and snacks    Exercise:  walking    DM complications:  Retinopathy: due not been for 1-2 years.   Nephropathy: urine microalbumin negative 8/2018 - due  Neuropathy:   Most recent LDL:   LDL Cholesterol Calculated   Date Value Ref Range Status   01/19/2023 16 <=100 mg/dL Final   06/18/2020 26 <100 mg/dL Final     Comment:     Desirable:       <100 mg/dl       Interval History as of 7/5/2023 : Patient has been doing well.A1c in 4/2023 was 8.4, but by reviewing CGM, average glucose 164, estimated A1C around 7.2, so it is excellent control currently. Her daughter is taking care of her medications and she has psychiatry close follow up. She is compliant to Basaglar insulin.   Interval History as of 1/4/2023 : Patient has been doing well and feeling better. Medication compliance  Improving. She mentioned occasionally lower side of glucose am, recent glucose 130-150s, however occasional hypoglycemia during the day 70s and morning.  Interval History as of 5/13/2022 : Patient has been doing ok, depression fluctuates. She forgets insulin.   Interval History as of 11/12/2021 : Patient has been doing better, back to compliant to medication . New event includes : A1c increased 10.5 while she was in [place] and not better .  Interval History as of 12/11/2020 : Patient has been doing well.A1c 7.9 today (12/11/2020), her glucose improved significantly, came with her daughter today and she is watching mother's diet.   Interval History as of 6/5/2020 : Fluctuating glucose, lots fast foods nowadays. Medication compliance good .  Interval History as of 9/20/2019 : Follow-up in 4 months, compliant medications.  However she still have nighttime snacking habit every night, when she eat carbohydrate snacks glucose in the morning was 250s.  Light fruits 160-180s.  Patient mentioned depression was worse spring 2019 but currently relatively okay and she has psychiatrist to follow-up.    Interval History as of 5/2019: Patient came by herself she missed appointment, increasing the dose of insulin, mentioned started to have a glucose increase 300 in the fasting in the morning her mental issue.  Seen by new psychiatrist. Visitign nurse once a week.    HPI: A 63 yo female second follow up DM2, she has remote history of colectomy. This is the third visit.   Used to use V go for several month, she was confused the system, worsened A1C, then we switch back to insulin injection regimens.    Currently lantus 38 utnis and novology 6-8-8 and sliding scale and metformin 1000 bid.   Last visit prscribed actos 30 mg daily, however she mentioned, she is not sure which one is actos and she may not taking actos.      Of note,  she has had sleeping issues, which distracts her life significantly. She cannot sleep at night, usually sleeps in the day, during the night, she has been hungry and eating peanuts butters, jelly, fruits etc.   Sometimes she cannot sleep for 2 days.   She has visiting nurse once a week, yesterday the nurse called our clinic that her glucose was 500. Talked with Mikayla, lantus was increased from 38 to 42 units.     Glucose: she forgot to bring glucometer today.     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     Past Surgical History:   Procedure Laterality Date     ARTHROSCOPY KNEE RT/LT      LT     BLEPHAROPLASTY BILATERAL Bilateral 8/16/2019    Procedure: BILATERAL UPPER LID BLEPHAROPLASTY;  Surgeon: Randolph Cook MD;  Location: [facility]     BREAST  LUMPECTOMY, RT/LT      LT-benign      CATARACT IOL, RT/LT       COLONOSCOPY  5/10/2012    Procedure:COLONOSCOPY; screening colonoscopy; Surgeon:MILLA VEGA; Location:[facility] OR     COLONOSCOPY WITH CO2 INSUFFLATION N/A 8/2/2019    Procedure: Colonoscopy with CO2 insufflation;  Surgeon: Himanshu Hi MD;  Location: [facility] OR     COLOSTOMY  2000     COLOSTOMY       HYSTERECTOMY, PAP NO LONGER INDICATED      done in [place]-cervical cancer     IMPLANT STIMULATOR SACRAL NERVE STAGE ONE Right 3/10/2015    Procedure: IMPLANT STIMULATOR SACRAL NERVE STAGE ONE;  Surgeon: Teodora Yusuf MD;  [facility]: [facility]     IMPLANT STIMULATOR SACRAL NERVE STAGE TWO Right 3/31/2015    Procedure: IMPLANT STIMULATOR SACRAL NERVE STAGE TWO;  Surgeon: Teodora Yusuf MD;  [facility]     IMPLANT STIMULATOR SACRAL NERVE STAGE TWO Right 6/22/2020    Procedure: INSERTION, SACRAL NERVE STIMULATOR, STAGE 2 (replacement of interstim battery);  Surgeon: Teodora Yusuf MD;  Location: [facility]     INJECT EPIDURAL TRANSFORAMINAL Right 1/20/2023    Procedure: Right Lumbar 5-Sacral 1 Transforaminal Epidural Steroid Injection with fluoroscopic guidance and contrast;  Surgeon: Kulwinder Booth MD;  Location: [facility]     INJECT EPIDURAL TRANSFORAMINAL Right 3/23/2023    Procedure: Right Lumbar 5-Sacral 1 Transforaminal Epidural Steroid Injection with fluoroscopic guidance and contrast.;  Surgeon: Kulwinder Booth MD;  Location: [facility]     PHACOEMULSIFICATION WITH STANDARD INTRAOCULAR LENS IMPLANT Left 9/26/2019    Procedure: LEFT PHACOEMULSIFICATION, CATARACT, WITH STANDARD IOL INSERTION;  Surgeon: Francesco Winn MD;  Location: [facility]     PHACOEMULSIFICATION WITH STANDARD INTRAOCULAR LENS IMPLANT Right 10/24/2019    Procedure: RIGHT PHACOEMULSIFICATION, CATARACT, WITH STANDARD IOL INSERTION;  Surgeon: Francesco Winn MD;  Location: [facility]     REPAIR PTOSIS Bilateral 08/16/2019     SALPINGO OOPHORECTOMY,R/L/JENNIFER       Salpingo Oophorectomy, RT/LT/JENNIFER     RUST EXCIS PTERYGIUM  10/08    [facility]     RUST STOMACH SURGERY PROCEDURE UNLISTED       [facility] COLONOSCOPY THRU STOMA, DIAGNOSTIC  2002    normal per report-no records available-records destroyed       Allergies:  Allergies   Allergen Reactions     Morphine Sulfate Itching       Current Medications   Current Outpatient Medications   Medication     apixaban ANTICOAGULANT (ELIQUIS) 5 MG tablet     ARIPiprazole (ABILIFY) 15 MG tablet     blood glucose (NO BRAND SPECIFIED) test strip     blood glucose monitoring (NO BRAND SPECIFIED) meter device kit     blood glucose monitoring (ONE TOUCH ULTRA 2) meter device kit     blood glucose monitoring (ULTRA THIN 30G) lancets     buPROPion (WELLBUTRIN SR) 200 MG 12 hr tablet     busPIRone HCl (BUSPAR) 30 MG tablet     cetirizine (ZYRTEC) 10 MG tablet     Continuous Blood Gluc  (FREESTYLE CM 2 READER) GALINA     Continuous Blood Gluc Sensor (FREESTYLE CM 2 SENSOR) MISC     [employer] ACETAMINOPHEN EX  MG tablet     [employer]S ASPIRIN ADULT LOW DOSE 81 MG chewable tablet     diclofenac (VOLTAREN) 1 % topical gel     ferrous gluconate (FERGON) 324 (38 Fe) MG tablet     gabapentin (NEURONTIN) 600 MG tablet     glimepiride (AMARYL) 2 MG tablet     insulin aspart (NOVOLOG FLEXPEN) 100 UNIT/ML pen     insulin glargine (LANTUS SOLOSTAR) 100 UNIT/ML pen     insulin pen needle (31G X 5 MM) 31G X 5 MM miscellaneous     metFORMIN (GLUCOPHAGE XR) 500 MG 24 hr tablet     Multiple Vitamin (DAILY-ISAAC MULTIVITAMIN) TABS     naloxone (NARCAN) 4 MG/0.1ML nasal spray     nystatin (MYCOSTATIN) 928015 UNIT/GM external powder     omeprazole (PRILOSEC) 20 MG DR capsule     oxyCODONE (ROXICODONE) 5 MG tablet     PARoxetine (PAXIL) 40 MG tablet     pioglitazone (ACTOS) 45 MG tablet     propranolol ER (INDERAL LA) 80 MG 24 hr capsule     ramipril (ALTACE) 5 MG capsule     rOPINIRole (REQUIP) 0.25 MG tablet     simvastatin (ZOCOR) 20 MG tablet     tiZANidine  (ZANAFLEX) 2 MG tablet     traZODone (DESYREL) 50 MG tablet     vitamin C ([employer]S VITAMIN C) 500 MG tablet     VITAMIN D3 25 MCG (1000 UT) tablet     No current facility-administered medications for this visit.       Family History:  Family History   Problem Relation Age of Onset     Diabetes Mother      Cerebrovascular Disease Mother      Diabetes Father      Hypertension Father      Cancer Maternal Grandfather      Cancer Paternal Grandfather      Diabetes Brother      Diabetes Sister      Thyroid Disease Daughter      Thyroid Disease Sister      Multiple Sclerosis Daughter      Glaucoma No family hx of      Macular Degeneration No family hx of        Social History:  Social History     Tobacco Use     Smoking status: Former     Packs/day: 0.00     Types: Cigarettes     Start date: 1974     Quit date: 1975     Years since quittin.9     Smokeless tobacco: Never   Substance Use Topics     Alcohol use: Yes     Alcohol/week: 0.0 standard drinks of alcohol     Comment: social occasions   taking care of 11 and 18 year grand children and live togheter.     ROS:  Full review of systems taken with the help of the intake sheet. Otherwise a complete 14 point review of systems was taken and is negative unless stated in the history above.    Physical Exam:   not currently breastfeeding.  General: well appearing, no acute distress, pleasant and conversant,   Mental Status/neuro: alert and oriented  Face: symmetrical, normal facial color  Eyes: anicteric, no proptosis or lid lag  Resp: no acute distress        Labs (General):      Ref. Range 2020 14:23 2020 00:00 2021 09:32 10/8/2021 09:22 2021 00:00   Hemoglobin A1C Latest Ref Range: 0.0 - 5.7 % 10.6 (H) 7.9 (A) 7.7 (H) 10.5 (H) 8.9 (A)

## 2023-07-05 NOTE — NURSING NOTE
Is the patient currently in the state of [place]? YES    Visit mode:VIDEO    If the visit is dropped, the patient can be reconnected by: TELEPHONE VISIT: Phone number: [phone]    Will anyone else be joining the visit? NO      How would you like to obtain your AVS? MyChart    Are changes needed to the allergy or medication list? Patient notes she just reviewed her medications today with another person prior to this phone call. VF did not review medications again with patient due to this.    Reason for visit: RECHECK (Follow up- patient has questions regarding weekly insulin injection. )      Patient notes that her family tells her that she slurs her words, but patient reports she doesn't notice this.

## 2023-07-05 NOTE — PATIENT INSTRUCTIONS
"Recommendations from today's MTM visit:                                                         1. Prescription sent for diclofenac gel  2. Lab orders placed for updated iron and Vitamin D labs  3. Could consider GLP1-RA or SGLT-2 inhibitor. Patient to address with endocrinology today.    Follow-up: 1 month    It was great speaking with you today.  I value your experience and would be very thankful for your time in providing feedback in our clinic survey. In the next few days, you may receive an email or text message from [employer] with a link to a survey related to your  clinical pharmacist.\"     To schedule another MTM appointment, please call the clinic directly or you may call the MTM scheduling line at [phone] or toll-free at [phone].     My Clinical Pharmacist's contact information:                                                      Please feel free to contact me with any questions or concerns you have.      Liz Calero, Pharm.D, BCACP  Medication Therapy Management Pharmacist      "

## 2023-07-05 NOTE — LETTER
7/5/2023       RE: Holly Muir  [address] [facility] [address]     Dear Colleague,    Thank you for referring your patient, Holly Muir, to the [facility] ENDOCRINOLOGY CLINIC [place] at [facility]. Please see a copy of my visit note below.    Outcome for 06/27/23 7:44 AM: Data uploaded on [employer]  Alejandra Duvall MA  Outcome for 07/03/23 3:01 PM: Data obtained via [employer] website  Malathi Mccarty MA       Patient is showing 4/5 MNCM met. A1c not in range   Alejandra Duvall MA                   Virtual Visit Details    Type of service:  Telephone Visit   Phone call duration: 22 minutes     Holly is a 67 year old who is being evaluated via a billable telephone visit.      What phone number would you like to be contacted at? [phone]    How would you like to obtain your AVS? MyChart     SIENA Tucker                                                                 - Endocrinology Follow up -    Reason for visit/consult:  Uncontrolled type 2 diabetes mellitus with hyperglycemia, with long-term current use of insulin (H)    Primary care provider: Tiffany King      Assessment and Plan    # DM2, night time eating habits  A1c in 4/2023 was 8.4, but by reviewing CGM, average glucose 164, estimated A1C around 7.2, so it is excellent control currently.  Her daughter is taking care of her medications and she has psychiatry close follow up. She is compliant to Basaglar insulin.     - continue current Basaglar betweem 35-42 units night    -Continue current metformin 1000 mg bid (sje did not mention about diarrhea today)    - Continue current Actos 45 mg, body weight stable.     - Glimepiride 2 mg daily BID    - lab prior to next visit      # DM education  Her daughter wants to know more about diabetic foods.   They will make appt with Mikayla.     # Mental health/Depression      # DM device  Newly prescribed Libre2, she has hand tremors and  difficult for check with glucometer    -RTC with Dr. Kelly Martinez  in 6 months at  site         30  minutes spent on the date of the encounter doing chart review, history and exam, documentation and further activities as noted above.    Elizabeth Medrano MD  Staff Physician  Endocrinology and Metabolism  [facility] [employer]  License: [place] [address]  Pager: [phone]      Current Diabetic Regimens:  Basaglar 35-42 units night   Metformin 1000 twice daily  Actos 45 mg daily  Glimepiride 2 mg BID    Life Style:  Wake up 10-11 am  Breakfast after  Lunch 1-2 pm: sandwich  Dinner 6 pm: mexican fat foods  Bedtime: 9 pm  3 am : wake up and snacks    Exercise:  walking    DM complications:  Retinopathy: due not been for 1-2 years.   Nephropathy: urine microalbumin negative 8/2018 - due  Neuropathy:   Most recent LDL:   LDL Cholesterol Calculated   Date Value Ref Range Status   01/19/2023 16 <=100 mg/dL Final   06/18/2020 26 <100 mg/dL Final     Comment:     Desirable:       <100 mg/dl       Interval History as of 7/5/2023 : Patient has been doing well.A1c in 4/2023 was 8.4, but by reviewing CGM, average glucose 164, estimated A1C around 7.2, so it is excellent control currently. Her daughter is taking care of her medications and she has psychiatry close follow up. She is compliant to Basaglar insulin.   Interval History as of 1/4/2023 : Patient has been doing well and feeling better. Medication compliance  Improving. She mentioned occasionally lower side of glucose am, recent glucose 130-150s, however occasional hypoglycemia during the day 70s and morning.  Interval History as of 5/13/2022 : Patient has been doing ok, depression fluctuates. She forgets insulin.   Interval History as of 11/12/2021 : Patient has been doing better, back to compliant to medication . New event includes : A1c increased 10.5 while she was in [place] and not better .  Interval History as of 12/11/2020 : Patient has been doing well.A1c 7.9 today  (12/11/2020), her glucose improved significantly, came with her daughter today and she is watching mother's diet.   Interval History as of 6/5/2020 : Fluctuating glucose, lots fast foods nowadays. Medication compliance good .  Interval History as of 9/20/2019 : Follow-up in 4 months, compliant medications.  However she still have nighttime snacking habit every night, when she eat carbohydrate snacks glucose in the morning was 250s.  Light fruits 160-180s.  Patient mentioned depression was worse spring 2019 but currently relatively okay and she has psychiatrist to follow-up.   Interval History as of 5/2019: Patient came by herself she missed appointment, increasing the dose of insulin, mentioned started to have a glucose increase 300 in the fasting in the morning her mental issue.  Seen by new psychiatrist. Visitign nurse once a week.    HPI: A 61 yo female second follow up DM2, she has remote history of colectomy. This is the third visit.   Used to use V go for several month, she was confused the system, worsened A1C, then we switch back to insulin injection regimens.    Currently lantus 38 utnis and novology 6-8-8 and sliding scale and metformin 1000 bid.   Last visit prscribed actos 30 mg daily, however she mentioned, she is not sure which one is actos and she may not taking actos.      Of note,  she has had sleeping issues, which distracts her life significantly. She cannot sleep at night, usually sleeps in the day, during the night, she has been hungry and eating peanuts butters, jelly, fruits etc.   Sometimes she cannot sleep for 2 days.   She has visiting nurse once a week, yesterday the nurse called our clinic that her glucose was 500. Talked with Mikayla, lantus was increased from 38 to 42 units.     Glucose: she forgot to bring glucometer today.     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     Past Surgical History:   Procedure Laterality Date    ARTHROSCOPY KNEE RT/LT      LT    BLEPHAROPLASTY BILATERAL Bilateral 8/16/2019    Procedure: BILATERAL UPPER LID BLEPHAROPLASTY;  Surgeon: Randolph Cook MD;  Location: [facility] OR    BREAST LUMPECTOMY, RT/LT      LT-benign     CATARACT IOL, RT/LT      COLONOSCOPY  5/10/2012    Procedure:COLONOSCOPY; screening colonoscopy; Surgeon:MILLA VEGA; Location:[facility] OR    COLONOSCOPY WITH CO2 INSUFFLATION N/A 8/2/2019    Procedure: Colonoscopy with CO2 insufflation;  Surgeon: Himanshu Hi MD;  Location: [facility] OR    COLOSTOMY  2000    COLOSTOMY      HYSTERECTOMY, PAP NO LONGER INDICATED      done in [place]-cervical cancer    IMPLANT STIMULATOR SACRAL NERVE STAGE ONE Right 3/10/2015    Procedure: IMPLANT STIMULATOR SACRAL NERVE STAGE ONE;  Surgeon: Teodora Yusuf MD;  Location: [facility] OR    IMPLANT STIMULATOR SACRAL NERVE STAGE TWO Right 3/31/2015    Procedure: IMPLANT STIMULATOR SACRAL NERVE STAGE TWO;  Surgeon: Teodora Yusuf MD;  Location: [facility] OR    IMPLANT STIMULATOR SACRAL NERVE STAGE TWO Right 6/22/2020    Procedure: INSERTION, SACRAL NERVE STIMULATOR, STAGE 2 (replacement of interstim battery);  Surgeon: Teodora Yusuf MD;  Location: [facility] OR    INJECT EPIDURAL TRANSFORAMINAL Right 1/20/2023    Procedure: Right Lumbar 5-Sacral 1 Transforaminal Epidural Steroid Injection with fluoroscopic guidance and contrast;  Surgeon: Kulwinder Booth MD;  Location: [facility] OR    INJECT EPIDURAL TRANSFORAMINAL Right 3/23/2023    Procedure: Right Lumbar 5-Sacral 1 Transforaminal Epidural Steroid Injection with fluoroscopic guidance and contrast.;   Surgeon: Kulwinder Booth MD;  Location: [facility] OR    PHACOEMULSIFICATION WITH STANDARD INTRAOCULAR LENS IMPLANT Left 9/26/2019    Procedure: LEFT PHACOEMULSIFICATION, CATARACT, WITH STANDARD IOL INSERTION;  Surgeon: Francesco Winn MD;  Location: [facility]    PHACOEMULSIFICATION WITH STANDARD INTRAOCULAR LENS IMPLANT Right 10/24/2019    Procedure: RIGHT PHACOEMULSIFICATION, CATARACT, WITH STANDARD IOL INSERTION;  Surgeon: Francesco Winn MD;  Location: [facility] OR    REPAIR PTOSIS Bilateral 08/16/2019    SALPINGO OOPHORECTOMY,R/L/JENNFIER      Salpingo Oophorectomy, RT/LT/JENNIFER    ZZC EXCIS PTERYGIUM  10/08    [facility]    [facility] STOMACH SURGERY PROCEDURE UNLISTED      ZZ COLONOSCOPY THRU STOMA, DIAGNOSTIC  2002    normal per report-no records available-records destroyed       Allergies:  Allergies   Allergen Reactions    Morphine Sulfate Itching       Current Medications   Current Outpatient Medications   Medication    apixaban ANTICOAGULANT (ELIQUIS) 5 MG tablet    ARIPiprazole (ABILIFY) 15 MG tablet    blood glucose (NO BRAND SPECIFIED) test strip    blood glucose monitoring (NO BRAND SPECIFIED) meter device kit    blood glucose monitoring (ONE TOUCH ULTRA 2) meter device kit    blood glucose monitoring (ULTRA THIN 30G) lancets    buPROPion (WELLBUTRIN SR) 200 MG 12 hr tablet    busPIRone HCl (BUSPAR) 30 MG tablet    cetirizine (ZYRTEC) 10 MG tablet    Continuous Blood Gluc  (FREESTYLE CM 2 READER) GALINA    Continuous Blood Gluc Sensor (FREESTYLE CM 2 SENSOR) MISC    [employer] ACETAMINOPHEN EX  MG tablet    [employer]S ASPIRIN ADULT LOW DOSE 81 MG chewable tablet    diclofenac (VOLTAREN) 1 % topical gel    ferrous gluconate (FERGON) 324 (38 Fe) MG tablet    gabapentin (NEURONTIN) 600 MG tablet    glimepiride (AMARYL) 2 MG tablet    insulin aspart (NOVOLOG FLEXPEN) 100 UNIT/ML pen    insulin glargine (LANTUS SOLOSTAR) 100 UNIT/ML pen    insulin pen needle (31G X 5 MM) 31G X 5 MM miscellaneous    metFORMIN  (GLUCOPHAGE XR) 500 MG 24 hr tablet    Multiple Vitamin (DAILY-ISAAC MULTIVITAMIN) TABS    naloxone (NARCAN) 4 MG/0.1ML nasal spray    nystatin (MYCOSTATIN) 191613 UNIT/GM external powder    omeprazole (PRILOSEC) 20 MG DR capsule    oxyCODONE (ROXICODONE) 5 MG tablet    PARoxetine (PAXIL) 40 MG tablet    pioglitazone (ACTOS) 45 MG tablet    propranolol ER (INDERAL LA) 80 MG 24 hr capsule    ramipril (ALTACE) 5 MG capsule    rOPINIRole (REQUIP) 0.25 MG tablet    simvastatin (ZOCOR) 20 MG tablet    tiZANidine (ZANAFLEX) 2 MG tablet    traZODone (DESYREL) 50 MG tablet    vitamin C ([employer]S VITAMIN C) 500 MG tablet    VITAMIN D3 25 MCG (1000 UT) tablet     No current facility-administered medications for this visit.       Family History:  Family History   Problem Relation Age of Onset    Diabetes Mother     Cerebrovascular Disease Mother     Diabetes Father     Hypertension Father     Cancer Maternal Grandfather     Cancer Paternal Grandfather     Diabetes Brother     Diabetes Sister     Thyroid Disease Daughter     Thyroid Disease Sister     Multiple Sclerosis Daughter     Glaucoma No family hx of     Macular Degeneration No family hx of        Social History:  Social History     Tobacco Use    Smoking status: Former     Packs/day: 0.00     Types: Cigarettes     Start date: 1974     Quit date: 1975     Years since quittin.9    Smokeless tobacco: Never   Substance Use Topics    Alcohol use: Yes     Alcohol/week: 0.0 standard drinks of alcohol     Comment: social occasions   taking care of 11 and 18 year grand children [facility]     ROS:  Full review of systems taken with the help of the intake sheet. Otherwise a complete 14 point review of systems was taken and is negative unless stated in the history above.    Physical Exam:   not currently breastfeeding.  General: well appearing, no acute distress, pleasant and conversant,   Mental Status/neuro: alert and oriented  Face: symmetrical, normal facial  color  Eyes: anicteric, no proptosis or lid lag  Resp: no acute distress        Labs (General):      Ref. Range 8/24/2020 14:23 12/11/2020 00:00 1/6/2021 09:32 10/8/2021 09:22 11/12/2021 00:00   Hemoglobin A1C Latest Ref Range: 0.0 - 5.7 % 10.6 (H) 7.9 (A) 7.7 (H) 10.5 (H) 8.9 (A)

## 2023-07-06 ENCOUNTER — TELEPHONE (OUTPATIENT)
Dept: ENDOCRINOLOGY | Facility: CLINIC | Age: 68
End: 2023-07-06
Payer: COMMERCIAL

## 2023-07-06 NOTE — TELEPHONE ENCOUNTER
Provider: Elizabeth Medrano MD Department: [facility] DIABETES     Visit Disposition    Dispositions Check-out Note   0 Return in about 6 months (around 1/5/2024). 6 month follow up with Dr. Kelly Yan at [facility] (nurse practitioner)      VF called patient to schedule follow up per check out notes above from Dr. Medrano.     Patient unable to schedule follow up at this time as she noted she was heading to the ER to get her leg checked out because her nurse was concerned about a blood clot.   VF let her know we will contact her again to schedule at another time. Scheduling deferred to work queue. Patient notified by VF to contact the clinic with any further questions or concerns. Patient verbalized agreement with this.  Daniella LÓPEZ Virtual Visit Facilitator 11:13 AM July 6, 2023

## 2023-07-07 ENCOUNTER — TELEPHONE (OUTPATIENT)
Dept: FAMILY MEDICINE | Facility: CLINIC | Age: 68
End: 2023-07-07
Payer: COMMERCIAL

## 2023-07-07 ENCOUNTER — PATIENT OUTREACH (OUTPATIENT)
Dept: GERIATRIC MEDICINE | Facility: CLINIC | Age: 68
End: 2023-07-07
Payer: COMMERCIAL

## 2023-07-07 NOTE — Clinical Note
You are receiving this message because this member is on the state government program [facility] [facility] or [facility] [employer] [facility].  are required to notify the primary care physician with all ED visits, admissions and discharges from hospitals and nursing homes. If you have further questions please feel free to contact me.  Malathi Bacon RN [facility] [phone]

## 2023-07-07 NOTE — TELEPHONE ENCOUNTER
This writer attempted to contact Simran on 07/07/23.    Reason for call provider's message and left detailed message and to call clinic back at [phone] if there are any additional questions.      If Simran calls back:   Relay message below, document that Simran called and close encounter.      EVELYN Francis  [facility]

## 2023-07-07 NOTE — TELEPHONE ENCOUNTER
Home Care is calling regarding an established patient with  [facility].        6/8/2023   Home Care Information   Date of Home Care episode start 6/8/2023   Current following provider Dr. Tiffany King    Name/Phone Number Greg (RN Case Manager), [phone]   Home Care agency [facility]     Requesting orders from: Tiffany King  Provider is following patient: Yes  Is this a 60-day recertification request?  Yes    Orders Requested    Skilled Nursing  Request for recertification   Frequency: 1 x every other week for 8 weeks. Total of 4 visits.    Recertification start date 7/7/23 or 7/10/23.        Information was gathered and will be sent to provider for review.  RN will contact Home Care with information after provider review.  Confirmed ok to leave a detailed message with call back.  Contact information confirmed and updated as needed.    RETURN CALL TO MAIN OFFICE: [phone] PRESS 1 FOR HOMECARE ASK TO SPEAK WITH JENNIFER OR BRANDON. VM IS CONFIDENTIAL.    Jenae Min RN

## 2023-07-07 NOTE — PROGRESS NOTES
[facility] Care Coordination Contact  CC received notification of Emergency Room visit.  ER visit occurred on 07/06/23 at [facility] with Dx of cellulitis of R leg.    CC contacted member and left a message requesting a return call.  Member has a follow-up appointment with PCP: Yes: scheduled on 07/14/2023  Member has had a change in condition: No  New referrals placed: No  Home Visit Needed: No  Care plan reviewed and updated.  Dr. King notified of ED visit via EMR.    Malathi Bacon RN PHN  [facility]  [phone]

## 2023-07-12 ENCOUNTER — TELEPHONE (OUTPATIENT)
Dept: FAMILY MEDICINE | Facility: CLINIC | Age: 68
End: 2023-07-12
Payer: COMMERCIAL

## 2023-07-12 NOTE — PROGRESS NOTES
[facility]  [address] [place] [address] [address], [place] [address]  Main: [phone], Fax: [phone]    [facility] at: [facility] at [address] [place] [address]    Outpatient Visit Note:    Patient: Holly Muir  MRN: [id number]  : 1955  MEG: 2023    Reason for visit:  Small, age indeterminate pulmonary embolism within segmental branch of the right lower lobe pulmonary arterial system on 2023.     HPI:  Holly is a 68 year old female with a history of hysterectomy for cancer back in  for which ultimately underwent partial colectomy and creation of colostomy back around  (due to incontinence), who also has a history of schizoaffective disorder, chronic pain syndrome, type 2 DM, GERD, diabetic neuropathy, hyperlipidemia, anemia, hyponatremia, and chronic severe osteoarthritis of both knees with right worse than left, referred by Dr. Tiffany King, primary care provider for consultation in regard to her recent diagnosis of small caliber pulmonary embolism in setting of co-current pneumonia found on 2023.     Since 2022, Holly has worsening of her chronic right knee pain. She was found to have severe advanced osteoarthritis of the right knee and was seen by Dr. Sanford Sheehan of orthopedic surgery in 3/28/2023. At the time, her HA1C was too high for surgery. In the past several months, the pain of her right knee has been debilitating to the point where she reports that she sits at home most of the time. Any ambulation or any range of motion of her right knee cause her pain. Thus she was rather immobile for the past several months.      Then back on 2023, she presented to the emergency department within the [facility] with a complaint of right calf pain and some swelling. Right leg venous ultrasound was done and showed no DVT, no superficial thrombophlebitis and no popliteal cyst.     Then on 2023, she  "again presented to the emergency department at [facility] with chest pain, vomiting, light-headedness and diaphoresis. Her D-Dimer was noted to be elevated at 1.96 (normal <0.5). Thus CTA chest was done showing:  \"Small, age-indeterminate pulmonary embolism within segmental branch of the right lower lobe pulmonary arterial system. Age-indeterminate right lower lobe consolidation, possibly an acute pneumonia. Mild interstitial edema. Small right pleural effusion. Mild subcarinal lymphadenopathy. Chest CT follow up recommended in 3-6 months.\" Bilateral lower extremity venous ultrasound showed no DVT. She was admitted and started on unfractionated heparin along with antibiotics for her pneumonia. TTE was done and showed normal functioning heart. She was subsequently discharged on apixaban on 5/10/2023.     On 6/6/2023, she had a follow up visit with Dr. Sanford Sheehan of [facility] who recommending that she should hold off of right total knee arthroplasty for 6-12 months considering her pulmonary embolic event found on 5/7/2023. He also recommended a hematology consultation and thus the patient is here today for an evaluation.     Currently she is on apxiaban at 5 mg PO BID dosing. She denies any bleeding issues. Her last colonoscopy was done back in 2019 with one polypectomy.     In review of her records, she has been anemic since at least 2 years ago but recently, her hemoglobin has further dropped to the 10's and today it is at 9.0 (7/14/2023). Her last hemoglobin was done back in 1/19/2023 and it was 10.0. Iron panel was note done back in Jan 2023 but she was told to start Ferrous Gluconate at the time. According to the patient's daughter, she has been taking ferrous gluconate daily since Jan 2023 as instructed by Dr. King at the time. Iron panel from this morning is pending.     ROS:  Denies any bleeding complications. Specifically, no frequent epistaxis. No issues with oral mucosal bleeding. Denies any " hematuria or blood in stools within the colostomy bag. Denies any shortness of breath. No chest pain. No cough. No fever.    Medications:  Current Outpatient Medications   Medication     apixaban ANTICOAGULANT (ELIQUIS) 5 MG tablet     ARIPiprazole (ABILIFY) 15 MG tablet     blood glucose (NO BRAND SPECIFIED) test strip     blood glucose monitoring (NO BRAND SPECIFIED) meter device kit     blood glucose monitoring (ONE TOUCH ULTRA 2) meter device kit     blood glucose monitoring (ULTRA THIN 30G) lancets     buPROPion (WELLBUTRIN SR) 200 MG 12 hr tablet     busPIRone HCl (BUSPAR) 30 MG tablet     cetirizine (ZYRTEC) 10 MG tablet     Continuous Blood Gluc  (FREESTYLE CM 2 READER) GALINA     Continuous Blood Gluc Sensor (FREESTYLE CM 2 SENSOR) MISC     [employer] ACETAMINOPHEN EX  MG tablet     [employer]S ASPIRIN ADULT LOW DOSE 81 MG chewable tablet     diclofenac (VOLTAREN) 1 % topical gel     ferrous gluconate (FERGON) 324 (38 Fe) MG tablet     gabapentin (NEURONTIN) 600 MG tablet     glimepiride (AMARYL) 2 MG tablet     insulin aspart (NOVOLOG FLEXPEN) 100 UNIT/ML pen     insulin glargine (LANTUS SOLOSTAR) 100 UNIT/ML pen     insulin pen needle (31G X 5 MM) 31G X 5 MM miscellaneous     metFORMIN (GLUCOPHAGE XR) 500 MG 24 hr tablet     Multiple Vitamin (DAILY-ISAAC MULTIVITAMIN) TABS     naloxone (NARCAN) 4 MG/0.1ML nasal spray     nystatin (MYCOSTATIN) 585950 UNIT/GM external powder     omeprazole (PRILOSEC) 20 MG DR capsule     oxyCODONE (ROXICODONE) 5 MG tablet     PARoxetine (PAXIL) 40 MG tablet     pioglitazone (ACTOS) 45 MG tablet     propranolol ER (INDERAL LA) 80 MG 24 hr capsule     ramipril (ALTACE) 5 MG capsule     rOPINIRole (REQUIP) 0.25 MG tablet     simvastatin (ZOCOR) 20 MG tablet     tiZANidine (ZANAFLEX) 2 MG tablet     traZODone (DESYREL) 50 MG tablet     vitamin C (CVS VITAMIN C) 500 MG tablet     VITAMIN D3 25 MCG (1000 UT) tablet     No current facility-administered medications for this  visit.     Allergies:  Allergies   Allergen Reactions     Morphine Sulfate Itching     PmHx:  Past Medical History:   Diagnosis Date     Bilateral knee pain      Cataracts, both eyes 5/8/2013     Cervical cancer (H)      Chronic kidney disease, stage 3a (H) 11/17/2021     Diabetes      Diabetic retinopathy (H)      HTN      Inflammatory arthritis      Major depression      Memory loss      Nephropathy      OA (osteoarthritis) of knee 9/11/2013     Other and unspecified hyperlipidemia      Pterygium eye      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     Social History:   Deferred.    Family History:  Holly and her daughter denies any family history of venous thromboembolism.     Objective:  Vitals: /73 (BP Location: Right arm, Patient Position: Sitting, Cuff Size: Adult Regular)   Pulse 69   Temp 98  F (36.7  C) (Oral)   Wt 92.1 kg (203 lb 1.6 oz)   SpO2 100%   BMI 38.06 kg/m    Exam:   She uses a walker to assist in her ambulation.  She is not in any acute distress.    Labs:  Component      Latest Ref Rng 6/7/2023  12:24 PM   Sodium      136 - 145 mmol/L 134 (L)    Potassium      3.4 - 5.3 mmol/L 5.0    Chloride      98 - 107 mmol/L 97 (L)    Carbon Dioxide (CO2)      22 - 29 mmol/L 23    Anion Gap      7 - 15 mmol/L 14    Urea Nitrogen      8.0 - 23.0 mg/dL 23.3 (H)    Creatinine      0.51 - 0.95 mg/dL 0.82    Calcium      8.8 - 10.2 mg/dL 9.9    Glucose      70 - 99 mg/dL 211 (H)    GFR Estimate      >60 mL/min/1.73m2 78       Component      Latest Ref Rng 7/14/2023  9:44 AM   WBC      4.0 - 11.0 10e3/uL 5.5    RBC Count      3.80 - 5.20 10e6/uL 3.42 (L)    Hemoglobin      11.7 - 15.7 g/dL 9.0 (L)    Hematocrit      35.0 - 47.0 % 29.3 (L)    MCV      78 - 100 fL 86    MCH      26.5 - 33.0 pg 26.3 (L)    MCHC      31.5 - 36.5 g/dL 30.7 (L)    RDW      10.0 - 15.0 % 16.0 (H)    Platelet Count      150 - 450 10e3/uL 339       6/1/2023 done at outside facility: WBC 5.7; Hgb 9.7; PLT  374    Imaging:  Reviewed and are as described above.     Assessment:  In summary, Holly is a 68 year old female with a history of hysterectomy for cancer back in 1980s for which ultimately underwent partial colectomy and creation of colostomy back around 2002 (due to incontinence), who also has a history of schizoaffective disorder, chronic pain syndrome, type 2 DM, GERD, diabetic neuropathy, hyperlipidemia, anemia, hyponatremia, and chronic severe osteoarthritis of both knees with right worse than left, referred by Dr. Tiffany King, primary care provider for consultation in regard to her recent diagnosis of small caliber pulmonary embolism in setting of co-current pneumonia found on 5/7/2023. To complicate matters, the patient has severe osteoarthritis of the right knee for which she has been followed by Dr. Sanford Sheehan of orthopedics in regard to possibly undergoing right total knee arthroplasty.     Holly's small caliber pulmonary embolism back on 5/7/2023 was a provoked event as she was rather immobilized due to her right knee severe osteoarthritis. Additionally, at the time of the finding of pulmonary embolism, she also was found to have pneumonia, which also increases her risk for venous thromboembolism.      In regard to her anemia, it is likely that she is iron deficient. The cause of her iron deficient is unclear. It could be dietary but also cannot entirely exclude other causes (occult blood loss).     Diagnosis:    Provoked small burden pulmonary embolism on 5/7/2023 without evidence of lower extremity DVT.     S/P partial colectomy in 2002 with colostomy creation while she was residing in [place].     Chronic anemic (at least since 2020). Likely she is iron deficient.     Severe osteoarthritis of the right knee. Anticipating the need of right total knee arthroplasty in the near future.     Plan:  I have a long discussion with the patient and her daughter in regard to our plan and recommendation today.  "    I spent some time to educate both of them in regard to DVT/PE, provoked vs unprovoked venous thromboembolic events, and general approach in regard to anticoagulation therapy and duration. I have also answered all their questions to her satisfaction.     I explain to Holly and her daughter that her pulmonary embolic event back in 5/7/2023 was a provoked event and in accordance to current American Society of Hematology (ALICIA) guidelines, 3-6 months of anticoagulation therapy should be sufficed. However, given the fact that her pulmonary embolic event was likely caused by the fact that she has been immobile from pain related to her right knee osteoarthritis, my recommendation is to have her stay on anticoagulation therapy until after her right total knee arthroplasty. In general, we do recommend against any elective surgeries that require interruption of her anticoagulation therapy for at least 3 months from the time of her pulmonary embolism diagnosis. Considering that her pulmonary embolism is small in caliber, I have no opposition for her to undergo right total knee arthroplasty on or after 8/7/2023. However, one complicated matter is that she has a history of partial colectomy and thus this raise the question about absorption issue on apixaban that she is taking. Thus I will proceed with checking an apixaban blood level and make sure that she has been therapeutic on her apixaban. If she is found to be subtherapeutic, we might have to switch her to rivaroxaban and \"count the clock\" of 3 months from the time we switched her over to rivaroxaban.     In regard to her anemia, as mentioned, she is likely iron deficient. Thus while we are waiting on having her surgery done, she might need IV iron replacement as clearly oral iron preparation has not been effective.     Below is a summary of my plan:    Check apixaban blood level next week. Blood collection needs to be done 3-4 hours after taking apixaban dose. We " checked with lab and they are not able to add-on apixaban level from this morning's collection.     Await Iron panel and ferritin from this morning and if she is confirmed to be iron deficient, will arrange for IV iron infusion.     Virtual clinic visit with this writer in early to Mid Aug 2023 to discuss anticoagulation management around the time of her surgery and also post operative anticoagulation duration. Additionally, we will recheck her CBC and iron panel at that time.     The preliminary plan for her anticoagulation management around the time of her surgery is that she is to hold her apixaban or rivaroxaban for 48 hours prior to surgery. Then restart anticoagulation the day following her surgery assuming that she has no bleeding complications. Continue on anticoagulation for 2 months post surgery and then discontinue.     Considering that this pulmonary embolic event was a provoked event and that she has no family history of venous thromboembolism, I do not feel that an inherit thrombophilia workup is indicated or necessary.     Patient is instructed to call our clinic if she should experience any unusual bleeding complications.      Carter Lan PA-C, MPAS  Physician Assistant  [facility].     62 minutes spent by me on the date of the encounter doing chart review, history and exam, documentation and further activities per the note.    Time IN: 12:09  Time OUT: 12:55

## 2023-07-12 NOTE — TELEPHONE ENCOUNTER
Home Care is calling regarding an [facility].        6/8/2023   Home Care Information   Date of Home Care episode start 6/8/2023   Current following provider Dr. Tiffany King    Name/Phone Number Greg (RN Case Manager), [phone]   Home Care agency [facility]     Requesting orders from: Tiffany King  Provider is following patient: Yes  Is this a 60-day recertification request?  No    Orders Requested    Skilled Nursing  Request for resumption in care.     Patient had change in condition and was seen in ED on 7/6. Was given Keflex 7 day course for cellulitis of right leg. Greg calling to get verbal order to resume home are visits.    Information was gathered and will be sent to provider for review.  RN will contact Home Care with information after provider review.  Confirmed ok to leave a detailed message with call back.  Contact information confirmed and updated as needed.    EVELYN Rajput  [facility] Primary Care Triage

## 2023-07-12 NOTE — TELEPHONE ENCOUNTER
RN called [facility] and relayed message from provider below. They deny further questions or concerns at this time.     Liz Hernandez, REEMAN, RN  [facility]  Nurse Triage, Family Practice

## 2023-07-14 ENCOUNTER — LAB (OUTPATIENT)
Dept: LAB | Facility: CLINIC | Age: 68
End: 2023-07-14
Payer: COMMERCIAL

## 2023-07-14 ENCOUNTER — OFFICE VISIT (OUTPATIENT)
Dept: FAMILY MEDICINE | Facility: CLINIC | Age: 68
End: 2023-07-14
Payer: COMMERCIAL

## 2023-07-14 ENCOUNTER — OFFICE VISIT (OUTPATIENT)
Dept: HEMATOLOGY | Facility: CLINIC | Age: 68
End: 2023-07-14
Attending: INTERNAL MEDICINE
Payer: COMMERCIAL

## 2023-07-14 VITALS
SYSTOLIC BLOOD PRESSURE: 123 MMHG | HEIGHT: 61 IN | TEMPERATURE: 98 F | OXYGEN SATURATION: 93 % | DIASTOLIC BLOOD PRESSURE: 73 MMHG | BODY MASS INDEX: 38.34 KG/M2 | RESPIRATION RATE: 14 BRPM | HEART RATE: 60 BPM | WEIGHT: 203.1 LBS

## 2023-07-14 VITALS
OXYGEN SATURATION: 100 % | TEMPERATURE: 98 F | WEIGHT: 203.1 LBS | HEART RATE: 69 BPM | SYSTOLIC BLOOD PRESSURE: 123 MMHG | BODY MASS INDEX: 38.06 KG/M2 | DIASTOLIC BLOOD PRESSURE: 73 MMHG

## 2023-07-14 DIAGNOSIS — F41.1 GAD (GENERALIZED ANXIETY DISORDER): ICD-10-CM

## 2023-07-14 DIAGNOSIS — E55.9 VITAMIN D DEFICIENCY: ICD-10-CM

## 2023-07-14 DIAGNOSIS — M17.11 PRIMARY OSTEOARTHRITIS OF RIGHT KNEE: ICD-10-CM

## 2023-07-14 DIAGNOSIS — E11.9 TYPE 2 DIABETES MELLITUS WITHOUT COMPLICATION, WITH LONG-TERM CURRENT USE OF INSULIN (H): ICD-10-CM

## 2023-07-14 DIAGNOSIS — D63.1 ANEMIA DUE TO STAGE 3A CHRONIC KIDNEY DISEASE (H): ICD-10-CM

## 2023-07-14 DIAGNOSIS — F32.0 MILD MAJOR DEPRESSION (H): ICD-10-CM

## 2023-07-14 DIAGNOSIS — E87.1 HYPONATREMIA: ICD-10-CM

## 2023-07-14 DIAGNOSIS — Z79.4 TYPE 2 DIABETES MELLITUS WITHOUT COMPLICATION, WITH LONG-TERM CURRENT USE OF INSULIN (H): ICD-10-CM

## 2023-07-14 DIAGNOSIS — I26.99 ACUTE PULMONARY EMBOLISM WITHOUT ACUTE COR PULMONALE, UNSPECIFIED PULMONARY EMBOLISM TYPE (H): ICD-10-CM

## 2023-07-14 DIAGNOSIS — N18.31 ANEMIA DUE TO STAGE 3A CHRONIC KIDNEY DISEASE (H): ICD-10-CM

## 2023-07-14 DIAGNOSIS — Z00.00 ENCOUNTER FOR MEDICARE ANNUAL WELLNESS EXAM: Primary | ICD-10-CM

## 2023-07-14 DIAGNOSIS — I26.93 SINGLE SUBSEGMENTAL PULMONARY EMBOLISM WITHOUT ACUTE COR PULMONALE (H): ICD-10-CM

## 2023-07-14 DIAGNOSIS — D50.8 IRON DEFICIENCY ANEMIA SECONDARY TO INADEQUATE DIETARY IRON INTAKE: ICD-10-CM

## 2023-07-14 PROBLEM — J18.9 PNEUMONIA: Status: ACTIVE | Noted: 2023-05-07

## 2023-07-14 LAB
ALBUMIN SERPL BCG-MCNC: 4.1 G/DL (ref 3.5–5.2)
ALP SERPL-CCNC: 83 U/L (ref 35–104)
ALT SERPL W P-5'-P-CCNC: 14 U/L (ref 0–50)
ANION GAP SERPL CALCULATED.3IONS-SCNC: 11 MMOL/L (ref 7–15)
AST SERPL W P-5'-P-CCNC: 19 U/L (ref 0–45)
BILIRUB SERPL-MCNC: 0.2 MG/DL
BUN SERPL-MCNC: 13.8 MG/DL (ref 8–23)
CALCIUM SERPL-MCNC: 9.6 MG/DL (ref 8.8–10.2)
CHLORIDE SERPL-SCNC: 96 MMOL/L (ref 98–107)
CHOLEST SERPL-MCNC: 98 MG/DL
CREAT SERPL-MCNC: 0.81 MG/DL (ref 0.51–0.95)
CREAT UR-MCNC: 114 MG/DL
DEPRECATED CALCIDIOL+CALCIFEROL SERPL-MC: 38 UG/L (ref 20–75)
DEPRECATED HCO3 PLAS-SCNC: 26 MMOL/L (ref 22–29)
ERYTHROCYTE [DISTWIDTH] IN BLOOD BY AUTOMATED COUNT: 16 % (ref 10–15)
FERRITIN SERPL-MCNC: 51 NG/ML (ref 11–328)
GFR SERPL CREATININE-BSD FRML MDRD: 79 ML/MIN/1.73M2
GLUCOSE SERPL-MCNC: 187 MG/DL (ref 70–99)
HBA1C MFR BLD: 7.9 % (ref 0–5.6)
HCT VFR BLD AUTO: 29.3 % (ref 35–47)
HDLC SERPL-MCNC: 57 MG/DL
HGB BLD-MCNC: 9 G/DL (ref 11.7–15.7)
IRON BINDING CAPACITY (ROCHE): 345 UG/DL (ref 240–430)
IRON SATN MFR SERPL: 14 % (ref 15–46)
IRON SERPL-MCNC: 49 UG/DL (ref 37–145)
LDLC SERPL CALC-MCNC: 23 MG/DL
MCH RBC QN AUTO: 26.3 PG (ref 26.5–33)
MCHC RBC AUTO-ENTMCNC: 30.7 G/DL (ref 31.5–36.5)
MCV RBC AUTO: 86 FL (ref 78–100)
MICROALBUMIN UR-MCNC: 26.5 MG/L
MICROALBUMIN/CREAT UR: 23.25 MG/G CR (ref 0–25)
NONHDLC SERPL-MCNC: 41 MG/DL
PLATELET # BLD AUTO: 339 10E3/UL (ref 150–450)
POTASSIUM SERPL-SCNC: 4.9 MMOL/L (ref 3.4–5.3)
PROT SERPL-MCNC: 6.8 G/DL (ref 6.4–8.3)
RBC # BLD AUTO: 3.42 10E6/UL (ref 3.8–5.2)
SODIUM SERPL-SCNC: 133 MMOL/L (ref 136–145)
TRIGL SERPL-MCNC: 91 MG/DL
WBC # BLD AUTO: 5.5 10E3/UL (ref 4–11)

## 2023-07-14 PROCEDURE — 85027 COMPLETE CBC AUTOMATED: CPT

## 2023-07-14 PROCEDURE — 82570 ASSAY OF URINE CREATININE: CPT

## 2023-07-14 PROCEDURE — 80053 COMPREHEN METABOLIC PANEL: CPT

## 2023-07-14 PROCEDURE — G0463 HOSPITAL OUTPT CLINIC VISIT: HCPCS | Performed by: PHYSICIAN ASSISTANT

## 2023-07-14 PROCEDURE — 91313 COVID-19 BIVALENT 18+ (MODERNA): CPT | Performed by: INTERNAL MEDICINE

## 2023-07-14 PROCEDURE — 83540 ASSAY OF IRON: CPT

## 2023-07-14 PROCEDURE — 36415 COLL VENOUS BLD VENIPUNCTURE: CPT

## 2023-07-14 PROCEDURE — 82728 ASSAY OF FERRITIN: CPT

## 2023-07-14 PROCEDURE — 80061 LIPID PANEL: CPT

## 2023-07-14 PROCEDURE — 82306 VITAMIN D 25 HYDROXY: CPT

## 2023-07-14 PROCEDURE — 83036 HEMOGLOBIN GLYCOSYLATED A1C: CPT

## 2023-07-14 PROCEDURE — G0439 PPPS, SUBSEQ VISIT: HCPCS | Performed by: INTERNAL MEDICINE

## 2023-07-14 PROCEDURE — 0134A COVID-19 BIVALENT 18+ (MODERNA): CPT | Performed by: INTERNAL MEDICINE

## 2023-07-14 PROCEDURE — 82043 UR ALBUMIN QUANTITATIVE: CPT

## 2023-07-14 PROCEDURE — 99215 OFFICE O/P EST HI 40 MIN: CPT | Performed by: PHYSICIAN ASSISTANT

## 2023-07-14 PROCEDURE — 83550 IRON BINDING TEST: CPT

## 2023-07-14 PROCEDURE — 99214 OFFICE O/P EST MOD 30 MIN: CPT | Mod: 25 | Performed by: INTERNAL MEDICINE

## 2023-07-14 ASSESSMENT — ENCOUNTER SYMPTOMS
ABDOMINAL PAIN: 0
COUGH: 0
JOINT SWELLING: 1
FREQUENCY: 1
NERVOUS/ANXIOUS: 1
HEMATURIA: 0
SORE THROAT: 0
DYSURIA: 0
BREAST MASS: 0
HEADACHES: 1
PARESTHESIAS: 0
FEVER: 0
NAUSEA: 0
WEAKNESS: 1
PALPITATIONS: 0
ARTHRALGIAS: 1
DIZZINESS: 0
CONSTIPATION: 0
SHORTNESS OF BREATH: 0
EYE PAIN: 1
HEMATOCHEZIA: 0
HEARTBURN: 1
MYALGIAS: 0
CHILLS: 0
DIARRHEA: 1

## 2023-07-14 ASSESSMENT — ANXIETY QUESTIONNAIRES
GAD7 TOTAL SCORE: 8
4. TROUBLE RELAXING: MORE THAN HALF THE DAYS
3. WORRYING TOO MUCH ABOUT DIFFERENT THINGS: SEVERAL DAYS
6. BECOMING EASILY ANNOYED OR IRRITABLE: SEVERAL DAYS
IF YOU CHECKED OFF ANY PROBLEMS ON THIS QUESTIONNAIRE, HOW DIFFICULT HAVE THESE PROBLEMS MADE IT FOR YOU TO DO YOUR WORK, TAKE CARE OF THINGS AT HOME, OR GET ALONG WITH OTHER PEOPLE: VERY DIFFICULT
GAD7 TOTAL SCORE: 8
2. NOT BEING ABLE TO STOP OR CONTROL WORRYING: SEVERAL DAYS
5. BEING SO RESTLESS THAT IT IS HARD TO SIT STILL: SEVERAL DAYS
7. FEELING AFRAID AS IF SOMETHING AWFUL MIGHT HAPPEN: SEVERAL DAYS
1. FEELING NERVOUS, ANXIOUS, OR ON EDGE: SEVERAL DAYS

## 2023-07-14 ASSESSMENT — ACTIVITIES OF DAILY LIVING (ADL)
CURRENT_FUNCTION: SHOPPING REQUIRES ASSISTANCE
CURRENT_FUNCTION: LAUNDRY REQUIRES ASSISTANCE
CURRENT_FUNCTION: TELEPHONE REQUIRES ASSISTANCE
CURRENT_FUNCTION: TRANSPORTATION REQUIRES ASSISTANCE
CURRENT_FUNCTION: PREPARING MEALS REQUIRES ASSISTANCE
CURRENT_FUNCTION: MONEY MANAGEMENT REQUIRES ASSISTANCE
CURRENT_FUNCTION: MEDICATION ADMINISTRATION REQUIRES ASSISTANCE
CURRENT_FUNCTION: BATHING REQUIRES ASSISTANCE
CURRENT_FUNCTION: HOUSEWORK REQUIRES ASSISTANCE

## 2023-07-14 ASSESSMENT — PAIN SCALES - GENERAL: PAINLEVEL: SEVERE PAIN (7)

## 2023-07-14 ASSESSMENT — PATIENT HEALTH QUESTIONNAIRE - PHQ9
10. IF YOU CHECKED OFF ANY PROBLEMS, HOW DIFFICULT HAVE THESE PROBLEMS MADE IT FOR YOU TO DO YOUR WORK, TAKE CARE OF THINGS AT HOME, OR GET ALONG WITH OTHER PEOPLE: SOMEWHAT DIFFICULT
SUM OF ALL RESPONSES TO PHQ QUESTIONS 1-9: 9
SUM OF ALL RESPONSES TO PHQ QUESTIONS 1-9: 9

## 2023-07-14 NOTE — PATIENT INSTRUCTIONS
[facility]  [facility]  [facility] [address], [place] [address]  Main: [phone], Fax: [phone]    It was a pleasure seeing you today.  Thank you for allowing us to be involved in your care. Please let us know if there is anything else we can do for you, so that we can be sure you are leaving completely satisfied with your care experience.    We need to draw an Eliquis level to make sure that your body is absorbing the medication. We draw this level 3 to 4 hours after taking medication. Please take your Eliquis at 6 AM on Tuesday 7/18/23 and get your lab drawn at 9:30 AM.    Carter will review your iron labs that were drawn earlier today.    Please stay on your blood thinner:  Eliquis  Please call us with any bleeding issues that you may have.    Patient Resources:   For additional information, please see the following web links:  www.stoptheclot.org, www.clotconnect.org.    Call the [facility]  at [phone].     -If surgeries or procedures are planned (for holding instructions).     -If off anticoagulation, please call during high risk times (long-distance travel, broken bones or trauma, immobilization, surgery, pregnancy, or taking estrogen).     -Any new symptoms of DVT (deep vein thrombosis) or PE (pulmonary embolism)    -pain     -swelling     -redness    -warmth    -shortness of breath    -chest pain    -coughing up blood    We would like a provider on our team to see you at least annually for optimal care and to allow us to continue to prescribe for you.  Carter Lan PA-C, Lona Daley, MPH, PAPinedaC  and Beckie Welch PA-C are our physician assistants that are specialized in bleeding and clotting disorders that you may be able to see more readily.    Return to clinic in August.  Please schedule a virtual return appointment with Carter Lan PA-C .    Your nurse clinician is Kalie Gonzalez RN, BSN, PCCN  [phone].   If she is unavailable and you have immediate concerns, please call [phone] and ask for a nurse.

## 2023-07-14 NOTE — PROGRESS NOTES
Prior to immunization administration, verified patients identity using patient s name and date of birth. Please see Immunization Activity for additional information.     Screening Questionnaire for Adult Immunization    Are you sick today?   No   Do you have allergies to medications, food, a vaccine component or latex?   No   Have you ever had a serious reaction after receiving a vaccination?   No   Do you have a long-term health problem with heart, lung, kidney, or metabolic disease (e.g., diabetes), asthma, a blood disorder, no spleen, complement component deficiency, a cochlear implant, or a spinal fluid leak?  Are you on long-term aspirin therapy?   No   Do you have cancer, leukemia, HIV/AIDS, or any other immune system problem?   No   Do you have a parent, brother, or sister with an immune system problem?   No   In the past 3 months, have you taken medications that affect  your immune system, such as prednisone, other steroids, or anticancer drugs; drugs for the treatment of rheumatoid arthritis, Crohn s disease, or psoriasis; or have you had radiation treatments?   No   Have you had a seizure, or a brain or other nervous system problem?   No   During the past year, have you received a transfusion of blood or blood    products, or been given immune (gamma) globulin or antiviral drug?   No   For women: Are you pregnant or is there a chance you could become       pregnant during the next month?   No   Have you received any vaccinations in the past 4 weeks?   No     Immunization questionnaire answers were all negative.      Patient instructed to remain in clinic for 15 minutes afterwards, and to report any adverse reactions.     Screening performed by Emili Caldwell MA on 7/14/2023 at 11:19 AM.  Covid -Moderna bivalent

## 2023-07-14 NOTE — PATIENT INSTRUCTIONS
Patient Education   Personalized Prevention Plan  You are due for the preventive services outlined below.  Your care team is available to assist you in scheduling these services.  If you have already completed any of these items, please share that information with your care team to update in your medical record.  Health Maintenance Due   Topic Date Due     Diabetic Foot Exam  06/27/2020     COVID-19 Vaccine (6 - Moderna series) 05/02/2023     Eye Exam  07/22/2023     Your Health Risk Assessment indicates you feel you are not in good health    A healthy lifestyle helps keep the body fit and the mind alert. It helps protect you from disease, helps you fight disease, and helps prevent chronic disease (disease that doesn't go away) from getting worse. This is important as you get older and begin to notice twinges in muscles and joints and a decline in the strength and stamina you once took for granted. A healthy lifestyle includes good healthcare, good nutrition, weight control, recreation, and regular exercise. Avoid harmful substances and do what you can to keep safe. Another part of a healthy lifestyle is stay mentally active and socially involved.    Good healthcare     Have a wellness visit every year.     If you have new symptoms, let us know right away. Don't wait until the next checkup.     Take medicines exactly as prescribed and keep your medicines in a safe place. Tell us if your medicine causes problems.   Healthy diet and weight control     Eat 3 or 4 small, nutritious, low-fat, high-fiber meals a day. Include a variety of fruits, vegetables, and whole-grain foods.     Make sure you get enough calcium in your diet. Calcium, vitamin D, and exercise help prevent osteoporosis (bone thinning).     If you live alone, try eating with others when you can. That way you get a good meal and have company while you eat it.     Try to keep a healthy weight. If you eat more calories than your body uses for energy, it  will be stored as fat and you will gain weight.     Recreation   Recreation is not limited to sports and team events. It includes any activity that provides relaxation, interest, enjoyment, and exercise. Recreation provides an outlet for physical, mental, and social energy. It can give a sense of worth and achievement. It can help you stay healthy.    Mental Exercise and Social Involvement  Mental and emotional health is as important as physical health. Keep in touch with friends and family. Stay as active as possible. Continue to learn and challenge yourself.   Things you can do to stay mentally active are:    Learn something new, like a foreign language or musical instrument.     Play SCRABBLE or do crossword puzzles. If you cannot find people to play these games with you at home, you can play them with others on your computer through the Internet.     Join a games club--anything from card games to chess or checkers or lawn bowling.     Start a new hobby.     Go back to school.     Volunteer.     Read.   Keep up with world events.    Exercise for a Healthier Heart  You may wonder how you can improve the health of your heart. If you re thinking about exercise, you re on the right track. You don t need to become an athlete. But you do need a certain amount of brisk exercise to help strengthen your heart. If you have been diagnosed with a heart condition, your healthcare provider may advise exercise to help your condition. To help make exercise a habit, choose safe, fun activities.      Exercise with a friend. When activity is fun, you're more likely to stick with it.     Before you start  Check with your healthcare provider before starting an exercise program. This is especially important if you haven't been active for a while. It's also important if you have a long-term (chronic) health problem such as heart disease, diabetes, or obesity. Also check with your provider if you're at high risk for having these  problems.   Why exercise?  Exercising regularly offers many healthy rewards. It can help you do all of these:     Improve your blood cholesterol level to help prevent further heart trouble.    Lower your blood pressure to help prevent a stroke or heart attack.    Control diabetes or reduce your risk of getting this disease.    Improve your heart and lung function.    Reach and stay at a healthy weight.    Make your muscles stronger so you can stay active.    Prevent falls and fractures by slowing the loss of bone mass (osteoporosis).    Manage stress better.    Improve your sense of self and your body image.  Exercise tips      Ease into your routine. Set small goals. Then build on them. Talk with your healthcare provider first before starting an exercise routine if you're not sure what your activity level should be.    Exercise on most days. Aim for a total of at least 150 minutes (2 hours and 30 minutes) or more of moderate-intensity aerobic activity each week. You could also do 75 minutes (1 hour and 15 minutes) or more of vigorous-intensity aerobic activity each week. Or try for a combination of both. Moderate activity means that you breathe heavier and your heart rate increases, but you can still talk. Think about doing at least 30 minutes of moderate exercise, 5 times a week. It's OK to work up to the 30-minute period over time. Examples of moderate-intensity activity are brisk walking, gardening, and water aerobics.    Step up your daily activity level.  Along with your exercise program, try being more active the whole day. Walk instead of drive. Or park further away so that you take more steps each day. Do more household tasks or yard work. You may not be able to meet the advised amount of physical activity. But doing some moderate- or vigorous-intensity aerobic activity can help reduce your risk for heart disease. Your healthcare provider can help you figure out what is best for you.    Choose 1 or more  activities you enjoy.  Walking is one of the easiest things you can do. You can also try swimming, riding a bike, dancing, or taking an exercise class.    Call 911  Call 911 right away if any of these occur:     Chest pain that doesn't go away quickly with rest    New burning, tightness, pressure, or heaviness in your chest, neck, shoulders, back, or arms    Abnormal or severe shortness of breath    A very fast or irregular heartbeat (palpitations)    Fainting  When to call your healthcare provider  Call your healthcare provider if you have any of these:     Dizziness or lightheadedness    Mild shortness of breath or chest pain    Increased or new joint or muscle pain    [employer] last reviewed this educational content on 7/1/2022 2000-2023 [employer]. All rights reserved. This information is not intended as a substitute for professional medical care. Always follow your healthcare professional's instructions.        Activities of Daily Living    Your Health Risk Assessment indicates you have difficulties with activities of daily living such as housework, bathing, preparing meals, taking medication, etc. Please make a follow up appointment for us to address this issue in more detail.    Signs of Hearing Loss  Hearing loss is a problem shared by many people. In fact, it's one of the most common health problems, particularly as people age. Most people aged 65 and older have some hearing loss. By age 80, almost everyone does. Hearing loss often occurs slowly over the years. So, you may not realize your hearing has gotten worse.   When sudden hearing loss occurs, it's important to contact your healthcare provider right away. Your provider will do a medical exam and a hearing exam as soon as possible. This is to help find the cause and type of your sudden hearing loss. Based on your diagnosis, your healthcare provider will discuss possible treatments.      Hearing much better with one ear can be a sign of  hearing loss.     Have your hearing checked  Call your healthcare provider if you:     Have to strain to hear normal conversation    Have to watch other people s faces very carefully to follow what they re saying    Need to ask people to repeat what they ve said    Often misunderstand what people are saying    Turn the volume of the television or radio up so high that others complain    Feel that people are mumbling when they re talking to you    Find that the effort to hear leaves you feeling tired and irritated    Notice, when using the phone, that you hear better with one ear than the other  [employer] last reviewed this educational content on 6/1/2022 2000-2023 [employer]. All rights reserved. This information is not intended as a substitute for professional medical care. Always follow your healthcare professional's instructions.          Urinary Incontinence, Female (Adult)   Urinary incontinence means loss of bladder control. This problem affects many women, especially as they get older. If you have incontinence, you may be embarrassed to ask for help. But know that this problem can be treated.   Types of Incontinence  There are different types of incontinence. Two of the main types are described here. You can have more than one type.     Stress incontinence. With this type, urine leaks when pressure (stress) is put on the bladder. This may happen when you cough, sneeze, or laugh. Stress incontinence most often occurs because the pelvic floor muscles that support the bladder and urethra are weak. This can happen after pregnancy and vaginal childbirth or a hysterectomy. It can also be due to excess body weight or hormone changes.    Urge incontinence (also called overactive bladder). With this type, a sudden urge to urinate is felt often. This may happen even though there may not be much urine in the bladder. The need to urinate often during the night is common. Urge incontinence most often  occurs because of bladder spasms. This may be due to bladder irritation or infection. Damage to bladder nerves or pelvic muscles, constipation, and certain medicines can also lead to urge incontinence.  Treatment depends on the cause. Further evaluation is needed to find the type you have. This will likely include an exam and certain tests. Based on the results, you and your healthcare provider can then plan treatment. Until a diagnosis is made, the home care tips below can help ease symptoms.   Home care    Do pelvic floor muscle exercises, if they are prescribed. The pelvic floor muscles help support the bladder and urethra. Many women find that their symptoms improve when doing special exercises that strengthen these muscles. To do the exercises, contract the muscles you would use to stop your stream of urine. But do this when you re not urinating. Hold for 10 seconds, then relax. Repeat 10 to 20 times in a row, at least 3 times a day. Your healthcare provider may give you other instructions for how to do the exercises and how often.    Keep a bladder diary. This helps track how often and how much you urinate over a set period of time. Bring this diary with you to your next visit with the provider. The information can help your provider learn more about your bladder problem.    Lose weight, if advised to by your provider. Extra weight puts pressure on the bladder. Your provider can help you create a weight-loss plan that s right for you. This may include exercising more and making certain diet changes.    Don't have foods and drinks that may irritate the bladder. These can include alcohol and caffeinated drinks.    Quit smoking. Smoking and other tobacco use can lead to a long-term (chronic) cough that strains the pelvic floor muscles. Smoking may also damage the bladder and urethra. Talk with your provider about treatments or methods you can use to quit smoking.    If drinking large amounts of fluid makes you  have symptoms, you may be advised to limit your fluid intake. You may also be advised to drink most of your fluids during the day and to limit fluids at night.    If you re worried about urine leakage or accidents, you may wear absorbent pads to catch urine. Change the pads often. This helps reduce discomfort. It may also reduce the risk of skin or bladder infections.    Follow-up care  Follow up with your healthcare provider, or as directed. It may take some to find the right treatment for your problem. But healthy lifestyle changes can be made right away. These include such things as exercising on a regular basis, eating a healthy diet, losing weight (if needed), and quitting smoking. Your treatment plan may include special therapies or medicines. Certain procedures or surgery may also be options. Talk about any questions you have with your provider.   When to seek medical advice  Call the healthcare provider right away if any of these occur:    Fever of 100.4 F (38 C) or higher, or as directed by your provider    Bladder pain or fullness    Belly swelling    Nausea or vomiting    Back pain    Weakness, dizziness, or fainting  [employer] last reviewed this educational content on 1/1/2020 2000-2022 [employer]. All rights reserved. This information is not intended as a substitute for professional medical care. Always follow your healthcare professional's instructions.        Your Health Risk Assessment indicates you feel you are not in good emotional health.    Recreation   Recreation is not limited to sports and team events. It includes any activity that provides relaxation, interest, enjoyment, and exercise. Recreation provides an outlet for physical, mental, and social energy. It can give a sense of worth and achievement. It can help you stay healthy.    Mental Exercise and Social Involvement  Mental and emotional health is as important as physical health. Keep in touch with friends and family.  "Stay as active as possible. Continue to learn and challenge yourself.   Things you can do to stay mentally active are:    Learn something new, like a foreign language or musical instrument.     Play SCRABBLE or do crossword puzzles. If you cannot find people to play these games with you at home, you can play them with others on your computer through the Internet.     Join a games club--anything from card games to chess or checkers or lawn bowling.     Start a new hobby.     Go back to school.     Volunteer.     Read.   Keep up with world events.    Depression and Suicide in Older Adults  Nearly 2 million older adults in the [place] have some type of depression. Some of them even take their own lives. Yet depression among older adults is often ignored. Learning about the warning signs of depression may help spare a loved one needless pain. You may also save a life.   What is depression?  Depression is a common and serious illness. It affects the way you think and feel. It is not a normal part of aging. It is not a sign of weakness, a character flaw, or something you can \"snap out of.\" Most people with depression need treatment to get better. The most common symptom is a feeling of deep sadness. People who are depressed also may seem tired and listless. And nothing seems to give them pleasure. It s normal to grieve or be sad sometimes. But sadness lessens or passes with time. Depression rarely goes away or improves on its own. Other symptoms of depression are:     Sleeping more or less than normal    Eating more or less than normal    Having headaches, stomachaches, or other pains that don t go away    Feeling nervous,  empty,  or worthless    Crying a lot    Thinking or talking about suicide or death    Loss of interest in activities previously enjoyed    Social isolation    Feeling confused or forgetful  What causes it?  The causes of depression aren t fully known. But it is thought to result from a complex blend of " these factors:     Biochemistry. Certain chemicals in the brain play a role.    Genes. Depression does run in families.    Life stress. Life stresses can also trigger depression in some people. Older adults often face many stressors. These may include isolation, the death of friends or a spouse, health problems, and financial concerns.    Chronic health conditions. This includes diabetes, heart disease, or cancer. These can cause symptoms of depression. Medicine side effects can cause changes in thoughts and behaviors.  Giving support    Depressed people often may not want to ask for help. When they do, they may be ignored. Or they may get the wrong treatment. You can help by showing parents and older friends love and support. If they seem depressed, don t lecture the person or ignore the symptoms. Don't discount the symptoms as a  normal  part of aging. They are not. Get involved, listen, and show interest and support.   Help them understand that depression is a treatable illness. Tell them you can help them find the right treatment. Offer to go to their healthcare provider's appointment with them for support when the symptoms are discussed. With their approval, contact a local mental health center, social service agency, or hospital about services.   Helping at healthcare visits  You can be an advocate for them at healthcare appointments. Many older adults have chronic illnesses. Many of these can cause symptoms of depression. Medicine side effects can change thoughts and behaviors.   You can help make sure that the healthcare provider looks at all of these factors. They should refer your family member or friend to a mental healthcare provider when needed. In some cases, untreated depression can lead to a misdiagnosis. A person may be diagnosed with a brain disorder such as dementia. If the healthcare provider does not take the issue of depression seriously, help your family member or friend find another provider.    Asking about self-harm thoughts  If you think an older person you care about could be suicidal, ask,  Have you thought about suicide?  Most people will tell you the truth. If they say yes, they may already have a plan for how and when they will attempt it. Find out as much as you can. The more detailed the plan, and the easier it is to carry out, the more danger the person is in right now. Tell the person you are there for them and you do not want them to get harmed. Don't wait to get help for the person. Call the person's healthcare provider, local hospital, or emergency services.   Call 988 in a crisis   Never leave the person alone. A person who is actively suicidal needs crisis care right away. They need constant supervision. Never leave the person out of sight. Call 988 Tell the crisis counselor you need help for a person who is thinking about suicide. The counselor will help you get the right level of crisis help. You may be advised to take the person to the nearest emergency room.   The National Suicide Prevention Lifeline is available at 988, or 387-264-TQRM (792-826-6482), or www.suicidepreventionlifeline.org. When you call or text 988, you will be connected to trained counselors who are part of the National Suicide Prevention Lifeline network. An online chat option is also available. Lifeline is free and available 24/7.   To learn more    National Suicide Prevention Lifeline at www.suicidepreventionlifeline.org  or 140-901-XVHO (465-350-3855)    National [place] [employer] Mental Illness at www.haja.org  or 345-942-VOAL (774-636-7650)    Mental [facility] at www.nmha.org  or 406-381-5993    National [place] of [facility] at www.nimh.nih.gov  or 499-874-9872    [employer] last reviewed this educational content on 7/1/2022 2000-2023 [employer]. All rights reserved. This information is not intended as a substitute for professional medical care. Always follow your healthcare professional's  instructions.

## 2023-07-14 NOTE — LETTER
[facility]  [address] [place] [address] [address], [place] [address]  Main: [phone], Fax: [phone]    Patient seen at: [facility] at [address] [place] [address]    Outpatient Visit Note:    Patient: Holly Muir  MRN: [id number]  : 1955  MEG: 2023    Reason for visit:  Small, age indeterminate pulmonary embolism within segmental branch of the right lower lobe pulmonary arterial system on 2023.     HPI:  Holly is a 68 year old female with a history of hysterectomy for cancer back in  for which ultimately underwent partial colectomy and creation of colostomy back around  (due to incontinence), who also has a history of schizoaffective disorder, chronic pain syndrome, type 2 DM, GERD, diabetic neuropathy, hyperlipidemia, anemia, hyponatremia, and chronic severe osteoarthritis of both knees with right worse than left, referred by Dr. Tiffany King, primary care provider for consultation in regard to her recent diagnosis of small caliber pulmonary embolism in setting of co-current pneumonia found on 2023.     Since 2022, Holly has worsening of her chronic right knee pain. She was found to have severe advanced osteoarthritis of the right knee and was seen by Dr. Sanford Sheehan of orthopedic surgery in 3/28/2023. At the time, her HA1C was too high for surgery. In the past several months, the pain of her right knee has been debilitating to the point where she reports that she sits at home most of the time. Any ambulation or any range of motion of her right knee cause her pain. Thus she was rather immobile for the past several months.      Then back on 2023, she presented to the emergency department within the [facility] with a complaint of right calf pain and some swelling. Right leg venous ultrasound was done and showed no DVT, no superficial thrombophlebitis and no popliteal cyst.     Then on 2023,  "she again presented to the emergency department at [facility] with chest pain, vomiting, light-headedness and diaphoresis. Her D-Dimer was noted to be elevated at 1.96 (normal <0.5). Thus CTA chest was done showing:  \"Small, age-indeterminate pulmonary embolism within segmental branch of the right lower lobe pulmonary arterial system. Age-indeterminate right lower lobe consolidation, possibly an acute pneumonia. Mild interstitial edema. Small right pleural effusion. Mild subcarinal lymphadenopathy. Chest CT follow up recommended in 3-6 months.\" Bilateral lower extremity venous ultrasound showed no DVT. She was admitted and started on unfractionated heparin along with antibiotics for her pneumonia. TTE was done and showed normal functioning heart. She was subsequently discharged on apixaban on 5/10/2023.     On 6/6/2023, she had a follow up visit with Dr. Sanford Sheehan of [facility] who recommending that she should hold off of right total knee arthroplasty for 6-12 months considering her pulmonary embolic event found on 5/7/2023. He also recommended a hematology consultation and thus the patient is here today for an evaluation.     Currently she is on apxiaban at 5 mg PO BID dosing. She denies any bleeding issues. Her last colonoscopy was done back in 2019 with one polypectomy.     In review of her records, she has been anemic since at least 2 years ago but recently, her hemoglobin has further dropped to the 10's and today it is at 9.0 (7/14/2023). Her last hemoglobin was done back in 1/19/2023 and it was 10.0. Iron panel was note done back in Jan 2023 but she was told to start Ferrous Gluconate at the time. According to the patient's daughter, she has been taking ferrous gluconate daily since Jan 2023 as instructed by Dr. King at the time. Iron panel from this morning is pending.     ROS:  Denies any bleeding complications. Specifically, no frequent epistaxis. No issues with oral mucosal bleeding. Denies any " hematuria or blood in stools within the colostomy bag. Denies any shortness of breath. No chest pain. No cough. No fever.    Medications:  Current Outpatient Medications   Medication     apixaban ANTICOAGULANT (ELIQUIS) 5 MG tablet     ARIPiprazole (ABILIFY) 15 MG tablet     blood glucose (NO BRAND SPECIFIED) test strip     blood glucose monitoring (NO BRAND SPECIFIED) meter device kit     blood glucose monitoring (ONE TOUCH ULTRA 2) meter device kit     blood glucose monitoring (ULTRA THIN 30G) lancets     buPROPion (WELLBUTRIN SR) 200 MG 12 hr tablet     busPIRone HCl (BUSPAR) 30 MG tablet     cetirizine (ZYRTEC) 10 MG tablet     Continuous Blood Gluc  (FREESTYLE CM 2 READER) GALINA     Continuous Blood Gluc Sensor (FREESTYLE CM 2 SENSOR) MISC     [employer] ACETAMINOPHEN EX  MG tablet     [employer]S ASPIRIN ADULT LOW DOSE 81 MG chewable tablet     diclofenac (VOLTAREN) 1 % topical gel     ferrous gluconate (FERGON) 324 (38 Fe) MG tablet     gabapentin (NEURONTIN) 600 MG tablet     glimepiride (AMARYL) 2 MG tablet     insulin aspart (NOVOLOG FLEXPEN) 100 UNIT/ML pen     insulin glargine (LANTUS SOLOSTAR) 100 UNIT/ML pen     insulin pen needle (31G X 5 MM) 31G X 5 MM miscellaneous     metFORMIN (GLUCOPHAGE XR) 500 MG 24 hr tablet     Multiple Vitamin (DAILY-ISAAC MULTIVITAMIN) TABS     naloxone (NARCAN) 4 MG/0.1ML nasal spray     nystatin (MYCOSTATIN) 626020 UNIT/GM external powder     omeprazole (PRILOSEC) 20 MG DR capsule     oxyCODONE (ROXICODONE) 5 MG tablet     PARoxetine (PAXIL) 40 MG tablet     pioglitazone (ACTOS) 45 MG tablet     propranolol ER (INDERAL LA) 80 MG 24 hr capsule     ramipril (ALTACE) 5 MG capsule     rOPINIRole (REQUIP) 0.25 MG tablet     simvastatin (ZOCOR) 20 MG tablet     tiZANidine (ZANAFLEX) 2 MG tablet     traZODone (DESYREL) 50 MG tablet     vitamin C ([employer]S VITAMIN C) 500 MG tablet     VITAMIN D3 25 MCG (1000 UT) tablet     No current facility-administered medications for this  visit.     Allergies:  Allergies   Allergen Reactions     Morphine Sulfate Itching     PmHx:  Past Medical History:   Diagnosis Date     Bilateral knee pain      Cataracts, both eyes 5/8/2013     Cervical cancer (H)      Chronic kidney disease, stage 3a (H) 11/17/2021     Diabetes      Diabetic retinopathy (H)      HTN      Inflammatory arthritis      Major depression      Memory loss      Nephropathy      OA (osteoarthritis) of knee 9/11/2013     Other and unspecified hyperlipidemia      Pterygium eye      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     Social History:   Deferred.    Family History:  Holly and her daughter denies any family history of venous thromboembolism.     Objective:  Vitals: /73 (BP Location: Right arm, Patient Position: Sitting, Cuff Size: Adult Regular)   Pulse 69   Temp 98  F (36.7  C) (Oral)   Wt 92.1 kg (203 lb 1.6 oz)   SpO2 100%   BMI 38.06 kg/m    Exam:   She uses a walker to assist in her ambulation.  She is not in any acute distress.    Labs:  Component      Latest Ref Rng 6/7/2023  12:24 PM   Sodium      136 - 145 mmol/L 134 (L)    Potassium      3.4 - 5.3 mmol/L 5.0    Chloride      98 - 107 mmol/L 97 (L)    Carbon Dioxide (CO2)      22 - 29 mmol/L 23    Anion Gap      7 - 15 mmol/L 14    Urea Nitrogen      8.0 - 23.0 mg/dL 23.3 (H)    Creatinine      0.51 - 0.95 mg/dL 0.82    Calcium      8.8 - 10.2 mg/dL 9.9    Glucose      70 - 99 mg/dL 211 (H)    GFR Estimate      >60 mL/min/1.73m2 78       Component      Latest Ref Rng 7/14/2023  9:44 AM   WBC      4.0 - 11.0 10e3/uL 5.5    RBC Count      3.80 - 5.20 10e6/uL 3.42 (L)    Hemoglobin      11.7 - 15.7 g/dL 9.0 (L)    Hematocrit      35.0 - 47.0 % 29.3 (L)    MCV      78 - 100 fL 86    MCH      26.5 - 33.0 pg 26.3 (L)    MCHC      31.5 - 36.5 g/dL 30.7 (L)    RDW      10.0 - 15.0 % 16.0 (H)    Platelet Count      150 - 450 10e3/uL 339       6/1/2023 done at outside facility: WBC 5.7; Hgb 9.7; PLT  374    Imaging:  Reviewed and are as described above.     Assessment:  In summary, Holly is a 68 year old female with a history of hysterectomy for cancer back in 1980s for which ultimately underwent partial colectomy and creation of colostomy back around 2002 (due to incontinence), who also has a history of schizoaffective disorder, chronic pain syndrome, type 2 DM, GERD, diabetic neuropathy, hyperlipidemia, anemia, hyponatremia, and chronic severe osteoarthritis of both knees with right worse than left, referred by Dr. Tiffany King, primary care provider for consultation in regard to her recent diagnosis of small caliber pulmonary embolism in setting of co-current pneumonia found on 5/7/2023. To complicate matters, the patient has severe osteoarthritis of the right knee for which she has been followed by Dr. Sanford Sheehan of orthopedics in regard to possibly undergoing right total knee arthroplasty.     Holly's small caliber pulmonary embolism back on 5/7/2023 was a provoked event as she was rather immobilized due to her right knee severe osteoarthritis. Additionally, at the time of the finding of pulmonary embolism, she also was found to have pneumonia, which also increases her risk for venous thromboembolism.      In regard to her anemia, it is likely that she is iron deficient. The cause of her iron deficient is unclear. It could be dietary but also cannot entirely exclude other causes (occult blood loss).     Diagnosis:    Provoked small burden pulmonary embolism on 5/7/2023 without evidence of lower extremity DVT.     S/P partial colectomy in 2002 with colostomy creation while she was residing in [place].     Chronic anemic (at least since 2020). Likely she is iron deficient.     Severe osteoarthritis of the right knee. Anticipating the need of right total knee arthroplasty in the near future.     Plan:  I have a long discussion with the patient and her daughter in regard to our plan and recommendation today.  "    I spent some time to educate both of them in regard to DVT/PE, provoked vs unprovoked venous thromboembolic events, and general approach in regard to anticoagulation therapy and duration. I have also answered all their questions to her satisfaction.     I explain to Holly and her daughter that her pulmonary embolic event back in 5/7/2023 was a provoked event and in accordance to current American Society of Hematology (ALICIA) guidelines, 3-6 months of anticoagulation therapy should be sufficed. However, given the fact that her pulmonary embolic event was likely caused by the fact that she has been immobile from pain related to her right knee osteoarthritis, my recommendation is to have her stay on anticoagulation therapy until after her right total knee arthroplasty. In general, we do recommend against any elective surgeries that require interruption of her anticoagulation therapy for at least 3 months from the time of her pulmonary embolism diagnosis. Considering that her pulmonary embolism is small in caliber, I have no opposition for her to undergo right total knee arthroplasty on or after 8/7/2023. However, one complicated matter is that she has a history of partial colectomy and thus this raise the question about absorption issue on apixaban that she is taking. Thus I will proceed with checking an apixaban blood level and make sure that she has been therapeutic on her apixaban. If she is found to be subtherapeutic, we might have to switch her to rivaroxaban and \"count the clock\" of 3 months from the time we switched her over to rivaroxaban.     In regard to her anemia, as mentioned, she is likely iron deficient. Thus while we are waiting on having her surgery done, she might need IV iron replacement as clearly oral iron preparation has not been effective.     Below is a summary of my plan:    Check apixaban blood level next week. Blood collection needs to be done 3-4 hours after taking apixaban dose. We " checked with lab and they are not able to add-on apixaban level from this morning's collection.     Await Iron panel and ferritin from this morning and if she is confirmed to be iron deficient, will arrange for IV iron infusion.     Virtual clinic visit with this writer in early to Mid Aug 2023 to discuss anticoagulation management around the time of her surgery and also post operative anticoagulation duration. Additionally, we will recheck her CBC and iron panel at that time.     The preliminary plan for her anticoagulation management around the time of her surgery is that she is to hold her apixaban or rivaroxaban for 48 hours prior to surgery. Then restart anticoagulation the day following her surgery assuming that she has no bleeding complications. Continue on anticoagulation for 2 months post surgery and then discontinue.     Considering that this pulmonary embolic event was a provoked event and that she has no family history of venous thromboembolism, I do not feel that an inherit thrombophilia workup is indicated or necessary.     Patient is instructed to call our clinic if she should experience any unusual bleeding complications.      Carter Lan PA-C, MPAS  Physician Assistant  [facility].     62 minutes spent by me on the date of the encounter doing chart review, history and exam, documentation and further activities per the note.    Time IN: 12:09  Time OUT: 12:55

## 2023-07-14 NOTE — PROGRESS NOTES
SUBJECTIVE:   Holly is a 68 year old who presents for Preventive Visit.      7/14/2023     9:50 AM   Additional Questions   Roomed by Marisa   Accompanied by Lyla Glass         7/14/2023     9:50 AM   Patient Reported Additional Medications   Patient reports taking the following new medications None     Are you in the first 12 months of your Medicare coverage?  No      Holly has Hyperlipidemia LDL goal <100; Severe major depression with psychotic features (H); History of cervical cancer; S/P colostomy (H); Memory loss; OCD (obsessive compulsive disorder); HTN, goal below 140/90; Hypoglycemia due to insulin; Encounter for long-term (current) use of insulin (H); Colostomy, evaluate (H); Bilateral low back pain without sciatica; Type 2 diabetes mellitus with hyperglycemia, with long-term current use of insulin (H); Diabetic neuropathy (H); Schizoaffective disorder (H); Unspecified inflammatory spondylopathy, lumbar region (H); and Chronic kidney disease, stage 3a (H) on their pertinent problem list.     ER visit: seen in ED on 7/6/2023 for cellulitis of the right leg.  Just finished 7 days of Keflex.  Swelling and redness have subsided significantly.  She continues to have lower extremity swelling.  She has compression stockings, not wearing today.    She is meeting with hematology later today due to history of PE.  She will be evaluated 1 she may be able to do right knee surgery.     She now lives with another daughter.    History of diabetes, insulin-dependent.  Follows with endocrine.  Ortho needs her A1c to be below 8.0 for surgery.  Previously had hypoglycemia secondary to overdose of insulin, trying to compensate on snacking.  Patient is doing better now in terms of not snacking as much and not using regular NovoLog.    Patient sees psychiatrist for depression/anxiety    Healthy Habits:     In general, how would you rate your overall health?  Poor    Frequency of exercise:  None    Do you usually eat at  "least 4 servings of fruit and vegetables a day, include whole grains    & fiber and avoid regularly eating high fat or \"junk\" foods?  Yes    Taking medications regularly:  No    Barriers to taking medications:  Problems remembering to take them    Medication side effects:  Not applicable, Muscle aches and Lightheadedness    Ability to successfully perform activities of daily living:  Telephone requires assistance, transportation requires assistance, shopping requires assistance, preparing meals requires assistance, housework requires assistance, bathing requires assistance, laundry requires assistance, medication administration requires assistance and money management requires assistance    Home Safety:  Lack of grab bars in the bathroom and lack of handrails on stairs    Hearing Impairment:  Difficulty following a conversation in a noisy restaurant or crowded room, feel that people are mumbling or not speaking clearly, difficulty following dialogue in the theater, difficult to understand a speaker at a public meeting or [employer] service, need to ask people to speak up or repeat themselves, difficulty understanding soft or whispered speech and difficulty understanding speech on the telephone    In the past 6 months, have you been bothered by leaking of urine? Yes    In general, how would you rate your overall mental or emotional health?  Poor    Additional concerns today:  No        Have you ever done Advance Care Planning? (For example, a Health Directive, POLST, or a discussion with a medical provider or your loved ones about your wishes): Yes, advance care planning is on file.       Fall risk  Fallen 2 or more times in the past year?: No  Any fall with injury in the past year?: No    Cognitive Screening   1) Repeat 3 items (Leader, Season, Table)    2) Clock draw: ABNORMAL was able to get the 11 but could not get the 10 after part.  3) 3 item recall: Recalls 3 objects  Results: Abnormal clock but got the 3 " words    Mini-CogTM Copyright JETHRO Roque. Licensed by the author for use in [facility]; reprinted with permission ([email or web address]@[email or web address]). All rights reserved.      Do you have sleep apnea, excessive snoring or daytime drowsiness?: yes    Reviewed and updated as needed this visit by clinical staff   Tobacco  Allergies  Meds              Reviewed and updated as needed this visit by Provider                 Social History     Tobacco Use     Smoking status: Former     Packs/day: 0.00     Types: Cigarettes     Start date: 1974     Quit date: 1975     Years since quittin.9     Passive exposure: Never     Smokeless tobacco: Never   Substance Use Topics     Alcohol use: Yes     Alcohol/week: 0.0 standard drinks of alcohol     Comment: social occasions             2023    10:03 AM   Alcohol Use   Prescreen: >3 drinks/day or >7 drinks/week? Not Applicable     Do you have a current opioid prescription? Yes   How severe is your pain on a scale from 1-10? 7/10         Current providers sharing in care for this patient include:   Patient Care Team:  Tiffany King MD PhD as PCP - General (Internal Medicine)  Priscilla Dugan, PhD LP as Other (see comments) (Psychology)  Teodora Yusuf MD as MD (Urology)  Fatmata Doherty MD as MD (Colon and Rectal Surgery)  David Elias AuD as Audiologist (Audiology)  Mikayla Sellers RN as Diabetes Educator (Diabetes Education)  Rossy Harding MD as MD (Dermatology)  Randolph Cook MD as MD (Ophthalmology)  Francesco Winn MD as MD (Ophthalmology)  Prachi Chou MD as MD (Psychiatry)  Elizabeth Medrano MD as Assigned Endocrinology Provider  Tiffany King MD PhD as Assigned PCP  Tiffany King MD PhD as Assigned Pain Medication Provider  Liz Calero [facility] as Pharmacist (Pharmacist)  Argelia Maciel RN as Diabetes Educator  Malathi Bacon RN as Lead Care Coordinator  Sanford Sheehan MD as Assigned Musculoskeletal  Provider  Patricia Loin RPH as Pharmacist (Pharmacist Ambulatory Care)  Patricia Lion RPH as Assigned MTM Pharmacist  Rayne Gaona MD as Assigned Surgical Provider    The following health maintenance items are reviewed in Epic and correct as of today:  Health Maintenance   Topic Date Due     DIABETIC FOOT EXAM  06/27/2020     EYE EXAM  07/22/2023     INFLUENZA VACCINE (1) 09/01/2023     A1C  10/14/2023     PHQ-9  01/14/2024     MAMMO SCREENING  01/25/2024     URINE DRUG SCREEN  04/27/2024     ANNUAL REVIEW OF HM ORDERS  06/02/2024     MEDICARE ANNUAL WELLNESS VISIT  07/14/2024     BMP  07/14/2024     LIPID  07/14/2024     MICROALBUMIN  07/14/2024     ISABELA ASSESSMENT  07/14/2024     FALL RISK ASSESSMENT  07/14/2024     HEMOGLOBIN  07/14/2024     COLORECTAL CANCER SCREENING  08/02/2024     ADVANCE CARE PLANNING  07/14/2028     DTAP/TDAP/TD IMMUNIZATION (4 - Td or Tdap) 09/14/2030     DEXA  09/20/2036     HEPATITIS C SCREENING  Completed     DEPRESSION ACTION PLAN  Completed     Pneumococcal Vaccine: 65+ Years  Completed     URINALYSIS  Completed     ZOSTER IMMUNIZATION  Completed     COVID-19 Vaccine  Completed     IPV IMMUNIZATION  Aged Out     MENINGITIS IMMUNIZATION  Aged Out     PAP  Discontinued             9/16/2021     2:39 PM 10/8/2021     8:59 AM   Breast CA Risk Assessment (FHS-7)   Do you have a family history of breast, colon, or ovarian cancer? No / Unknown No / Unknown         Pertinent mammograms are reviewed under the imaging tab.    Review of Systems   Constitutional: Negative for chills and fever.   HENT: Positive for hearing loss. Negative for congestion, ear pain and sore throat.    Eyes: Positive for pain and visual disturbance.   Respiratory: Negative for cough and shortness of breath.    Cardiovascular: Positive for peripheral edema. Negative for chest pain and palpitations.   Gastrointestinal: Positive for diarrhea and heartburn. Negative for abdominal pain, constipation,  "hematochezia and nausea.   Breasts:  Positive for tenderness. Negative for breast mass and discharge.   Genitourinary: Positive for frequency. Negative for dysuria, genital sores, hematuria, pelvic pain, urgency, vaginal bleeding and vaginal discharge.   Musculoskeletal: Positive for arthralgias and joint swelling. Negative for myalgias.   Skin: Negative for rash.   Neurological: Positive for weakness and headaches. Negative for dizziness and paresthesias.   Psychiatric/Behavioral: Positive for mood changes. The patient is nervous/anxious.      The symptoms are chronic    OBJECTIVE:   /73 (BP Location: Right arm, Patient Position: Sitting, Cuff Size: Adult Large)   Pulse 60   Temp 98  F (36.7  C) (Oral)   Resp 14   Ht 1.556 m (5' 1.25\")   Wt 92.1 kg (203 lb 1.6 oz)   LMP  (Approximate)   SpO2 93%   Breastfeeding No   BMI 38.06 kg/m   Estimated body mass index is 38.06 kg/m  as calculated from the following:    Height as of this encounter: 1.556 m (5' 1.25\").    Weight as of this encounter: 92.1 kg (203 lb 1.6 oz).  Physical Exam  GENERAL APPEARANCE: healthy, alert and no distress, walks with a walker has trouble getting onto the exam table due to knee pain  EYES: Eyes grossly normal to inspection, PERRL and conjunctivae and sclerae normal  HENT: ear canals and TM's normal, nose and mouth without ulcers or lesions, oropharynx clear and oral mucous membranes moist  RESP: lungs clear to auscultation - no rales, rhonchi or wheezes  CV: regular rate and rhythm, normal S1 S2, no S3 or S4, no murmur, click or rub, no peripheral edema and peripheral pulses strong  ABDOMEN: soft, nontender, no hepatosplenomegaly, no masses and bowel sounds normal  MS: Mild pitting edema in lower extremity bilaterally.  SKIN: no suspicious lesions or rashes  NEURO: Normal strength and tone, sensory exam grossly normal, mentation intact and speech normal  PSYCH: mentation appears normal and affect normal/bright    Diagnostic " Test Results:  Results for orders placed or performed in visit on 07/14/23 (from the past 24 hour(s))   Hemoglobin A1c   Result Value Ref Range    Hemoglobin A1C 7.9 (H) 0.0 - 5.6 %   Comprehensive metabolic panel   Result Value Ref Range    Sodium 133 (L) 136 - 145 mmol/L    Potassium 4.9 3.4 - 5.3 mmol/L    Chloride 96 (L) 98 - 107 mmol/L    Carbon Dioxide (CO2) 26 22 - 29 mmol/L    Anion Gap 11 7 - 15 mmol/L    Urea Nitrogen 13.8 8.0 - 23.0 mg/dL    Creatinine 0.81 0.51 - 0.95 mg/dL    Calcium 9.6 8.8 - 10.2 mg/dL    Glucose 187 (H) 70 - 99 mg/dL    Alkaline Phosphatase 83 35 - 104 U/L    AST 19 0 - 45 U/L    ALT 14 0 - 50 U/L    Protein Total 6.8 6.4 - 8.3 g/dL    Albumin 4.1 3.5 - 5.2 g/dL    Bilirubin Total 0.2 <=1.2 mg/dL    GFR Estimate 79 >60 mL/min/1.73m2   Lipid Profile   Result Value Ref Range    Cholesterol 98 <200 mg/dL    Triglycerides 91 <150 mg/dL    Direct Measure HDL 57 >=50 mg/dL    LDL Cholesterol Calculated 23 <=100 mg/dL    Non HDL Cholesterol 41 <130 mg/dL    Narrative    Cholesterol  Desirable:  <200 mg/dL    Triglycerides  Normal:  Less than 150 mg/dL  Borderline High:  150-199 mg/dL  High:  200-499 mg/dL  Very High:  Greater than or equal to 500 mg/dL    Direct Measure HDL  Female:  Greater than or equal to 50 mg/dL   Male:  Greater than or equal to 40 mg/dL    LDL Cholesterol  Desirable:  <100mg/dL  Above Desirable:  100-129 mg/dL   Borderline High:  130-159 mg/dL   High:  160-189 mg/dL   Very High:  >= 190 mg/dL    Non HDL Cholesterol  Desirable:  130 mg/dL  Above Desirable:  130-159 mg/dL  Borderline High:  160-189 mg/dL  High:  190-219 mg/dL  Very High:  Greater than or equal to 220 mg/dL   Albumin Random Urine Quantitative with Creat Ratio   Result Value Ref Range    Creatinine Urine mg/dL 114.0 mg/dL    Albumin Urine mg/L 26.5 mg/L    Albumin Urine mg/g Cr 23.25 0.00 - 25.00 mg/g Cr   Vitamin D Deficiency   Result Value Ref Range    Vitamin D, Total (25-Hydroxy) 38 20 - 75 ug/L     Narrative    Season, race, dietary intake, and treatment affect the concentration of 25-hydroxy-Vitamin D. Values may decrease during winter months and increase during summer months. Values 20-29 ug/L may indicate Vitamin D insufficiency and values <20 ug/L may indicate Vitamin D deficiency.    Vitamin D determination is routinely performed by an immunoassay specific for 25 hydroxyvitamin D3.  If an individual is on vitamin D2(ergocalciferol) supplementation, please specify 25 OH vitamin D2 and D3 level determination by LCMSMS test VITD23.     Ferritin   Result Value Ref Range    Ferritin 51 11 - 328 ng/mL   Iron and iron binding capacity   Result Value Ref Range    Iron 49 37 - 145 ug/dL    Iron Binding Capacity 345 240 - 430 ug/dL    Iron Sat Index 14 (L) 15 - 46 %   CBC with platelets   Result Value Ref Range    WBC Count 5.5 4.0 - 11.0 10e3/uL    RBC Count 3.42 (L) 3.80 - 5.20 10e6/uL    Hemoglobin 9.0 (L) 11.7 - 15.7 g/dL    Hematocrit 29.3 (L) 35.0 - 47.0 %    MCV 86 78 - 100 fL    MCH 26.3 (L) 26.5 - 33.0 pg    MCHC 30.7 (L) 31.5 - 36.5 g/dL    RDW 16.0 (H) 10.0 - 15.0 %    Platelet Count 339 150 - 450 10e3/uL     *Note: Due to a large number of results and/or encounters for the requested time period, some results have not been displayed. A complete set of results can be found in Results Review.       ASSESSMENT / PLAN:   Holly was seen today for wellness visit.    Diagnoses and all orders for this visit:    Encounter for Medicare annual wellness exam    Type 2 diabetes mellitus without complication, with long-term current use of insulin (H)  -     Orthopedic  Referral; Future    Primary osteoarthritis of right knee  Comments:  May continue to use oxycodone until she has knee surgery  Orders:  -     diclofenac (VOLTAREN) 1 % topical gel; Apply 4 g topically 4 times daily    Mild major depression (H)  -     VA BEHAV ASSMT W/SCORE & DOCD/STAND INSTRUMENT    ISABELA (generalized anxiety disorder)  -     VA  BEHAV ASSMT W/SCORE & DOCD/STAND INSTRUMENT    Iron deficiency anemia secondary to inadequate dietary iron intake    Other orders  -     COVID-19 BIVALENT 18+ (MODERNA)  -     PRIMARY CARE FOLLOW-UP SCHEDULING; Future        Patient has been advised of split billing requirements and indicates understanding: Yes      COUNSELING:  Reviewed preventive health counseling, as reflected in patient instructions        She reports that she quit smoking about 47 years ago. Her smoking use included cigarettes. She started smoking about 48 years ago. She has never been exposed to tobacco smoke. She has never used smokeless tobacco.      Appropriate preventive services were discussed with this patient, including applicable screening as appropriate for cardiovascular disease, diabetes, osteopenia/osteoporosis, and glaucoma.  As appropriate for age/gender, discussed screening for colorectal cancer, prostate cancer, breast cancer, and cervical cancer. Checklist reviewing preventive services available has been given to the patient.    Reviewed patients plan of care and provided an AVS. The Complex Care Plan (for patients with higher acuity and needing more deliberate coordination of services) for Holly meets the Care Plan requirement. This Care Plan has been established and reviewed with the Patient and daughter.          Tiffany King MD PhD  [facility]    Identified Health Risks:    I have reviewed Opioid Use Disorder and Substance Use Disorder risk factors and made any needed referrals.     Answers for HPI/ROS submitted by the patient on 7/14/2023  If you checked off any problems, how difficult have these problems made it for you to do your work, take care of things at home, or get along with other people?: Somewhat difficult  PHQ9 TOTAL SCORE: 9  ISABELA 7 TOTAL SCORE: 8        The patient was provided with suggestions to help her develop a healthy physical lifestyle.  She is at risk for lack of exercise and has  been provided with information to increase physical activity for the benefit of her well-being.  The patient reports that she has difficulty with activities of daily living.  The patient was provided with written information regarding signs of hearing loss.  Information on urinary incontinence and treatment options given to patient.  The patient was provided with suggestions to help her develop a healthy emotional lifestyle.  The patient's PHQ-9 score is consistent with mild depression. She was provided with information regarding depression and We will continue to follow-up with her psychiatrist and therapist

## 2023-07-18 ENCOUNTER — LAB (OUTPATIENT)
Dept: LAB | Facility: CLINIC | Age: 68
End: 2023-07-18
Payer: COMMERCIAL

## 2023-07-18 ENCOUNTER — MYC REFILL (OUTPATIENT)
Dept: FAMILY MEDICINE | Facility: CLINIC | Age: 68
End: 2023-07-18

## 2023-07-18 DIAGNOSIS — M25.561 CHRONIC PAIN OF RIGHT KNEE: ICD-10-CM

## 2023-07-18 DIAGNOSIS — F11.90 OPIOID USE DISORDER: ICD-10-CM

## 2023-07-18 DIAGNOSIS — M25.561 ARTHRALGIA OF BOTH KNEES: ICD-10-CM

## 2023-07-18 DIAGNOSIS — M17.11 PRIMARY OSTEOARTHRITIS OF RIGHT KNEE: ICD-10-CM

## 2023-07-18 DIAGNOSIS — G89.29 CHRONIC PAIN OF RIGHT KNEE: ICD-10-CM

## 2023-07-18 DIAGNOSIS — I26.93 SINGLE SUBSEGMENTAL PULMONARY EMBOLISM WITHOUT ACUTE COR PULMONALE (H): ICD-10-CM

## 2023-07-18 DIAGNOSIS — M25.562 ARTHRALGIA OF BOTH KNEES: ICD-10-CM

## 2023-07-18 PROCEDURE — 36415 COLL VENOUS BLD VENIPUNCTURE: CPT

## 2023-07-18 PROCEDURE — 80299 QUANTITATIVE ASSAY DRUG: CPT

## 2023-07-18 RX ORDER — OXYCODONE HYDROCHLORIDE 5 MG/1
5 TABLET ORAL 3 TIMES DAILY PRN
Qty: 90 TABLET | Refills: 0 | Status: SHIPPED | OUTPATIENT
Start: 2023-07-18 | End: 2023-08-17

## 2023-07-19 ENCOUNTER — VIRTUAL VISIT (OUTPATIENT)
Dept: NUTRITION | Facility: CLINIC | Age: 68
End: 2023-07-19
Payer: COMMERCIAL

## 2023-07-19 DIAGNOSIS — Z79.4 TYPE 2 DIABETES MELLITUS WITHOUT COMPLICATION, WITH LONG-TERM CURRENT USE OF INSULIN (H): Primary | ICD-10-CM

## 2023-07-19 DIAGNOSIS — E11.9 TYPE 2 DIABETES MELLITUS WITHOUT COMPLICATION, WITH LONG-TERM CURRENT USE OF INSULIN (H): Primary | ICD-10-CM

## 2023-07-19 LAB — APIXABAN PPP CHRO-MCNC: 329 NG/ML

## 2023-07-19 PROCEDURE — 99207 PR NO CHARGE LOS: CPT | Mod: VID | Performed by: DIETITIAN, REGISTERED

## 2023-07-19 RX ORDER — PSEUDOEPHED/ACETAMINOPH/DIPHEN 30MG-500MG
TABLET ORAL
Qty: 180 TABLET | Refills: 1 | Status: ON HOLD | OUTPATIENT
Start: 2023-07-19 | End: 2023-10-03

## 2023-07-21 ENCOUNTER — DOCUMENTATION ONLY (OUTPATIENT)
Dept: HEMATOLOGY | Facility: CLINIC | Age: 68
End: 2023-07-21

## 2023-07-21 ENCOUNTER — DOCUMENTATION ONLY (OUTPATIENT)
Dept: LAB | Facility: CLINIC | Age: 68
End: 2023-07-21

## 2023-07-21 DIAGNOSIS — I26.93 SINGLE SUBSEGMENTAL PULMONARY EMBOLISM WITHOUT ACUTE COR PULMONALE (H): Primary | ICD-10-CM

## 2023-07-21 DIAGNOSIS — D64.9 ANEMIA, UNSPECIFIED TYPE: ICD-10-CM

## 2023-07-21 DIAGNOSIS — Z93.3 S/P COLOSTOMY (H): ICD-10-CM

## 2023-07-21 NOTE — PROGRESS NOTES
Patient have Lab appointment on 7/21/23 and there is no orders. Please order test if needed.  Thanks  Nemo White MLT([facility])

## 2023-07-21 NOTE — PROGRESS NOTES
Medical Nutrition Therapy Consult No Show         At time of the appointment,sent video visit link to email and text both patient and daughter and was a no show for consult. If interested in rescheduling patient can call [phone] to book another consult.       Serena Caro RD, CLT, LD  Integrative Registered Dietitian

## 2023-07-21 NOTE — PROGRESS NOTES
[facility]  [facility], [address], [place] [address]  Main: [phone], Fax: [phone]    Documentation Note:    Patient: Holly Muir  MRN: [id number]  : 1955  Date of this note written: 2023    Component      Latest Ref Rng 2023  9:44 AM   WBC      4.0 - 11.0 10e3/uL 5.5    RBC Count      3.80 - 5.20 10e6/uL 3.42 (L)    Hemoglobin      11.7 - 15.7 g/dL 9.0 (L)    Hematocrit      35.0 - 47.0 % 29.3 (L)    MCV      78 - 100 fL 86    MCH      26.5 - 33.0 pg 26.3 (L)    MCHC      31.5 - 36.5 g/dL 30.7 (L)    RDW      10.0 - 15.0 % 16.0 (H)    Platelet Count      150 - 450 10e3/uL 339    Iron      37 - 145 ug/dL 49    Iron Binding Capacity      240 - 430 ug/dL 345    Iron Sat Index      15 - 46 % 14 (L)    Ferritin      11 - 328 ng/mL 51       She is found to be anemic but seems to be just mildly iron deficient. I will check her B12 and Folate (orders placed by this writer today) before we make a decision about how to manage her anemia. She could also have anemia of chronic disease.       Carter Lan PA-C, MPAS  Physician Assistant  [facility].

## 2023-07-26 ENCOUNTER — TELEPHONE (OUTPATIENT)
Dept: FAMILY MEDICINE | Facility: CLINIC | Age: 68
End: 2023-07-26
Payer: COMMERCIAL

## 2023-07-26 NOTE — TELEPHONE ENCOUNTER
Forms/Letter Request    Type of form/letter:  [facility]     Have you been seen for this request: N/A    Do we have the form/letter: Yes: Will place form on the providers desk for review/signature     When is form/letter needed by: asap    How would you like the form/letter returned: Fax : [phone]

## 2023-07-26 NOTE — TELEPHONE ENCOUNTER
Forms/Letter Request    Type of form/letter:  [facility] Order [id number]    Have you been seen for this request: N/A    Do we have the form/letter: Yes: Will place form on providers desk for review/signature    When is form/letter needed by: asap    How would you like the form/letter returned: Fax : [phone]

## 2023-07-27 ENCOUNTER — LAB (OUTPATIENT)
Dept: LAB | Facility: CLINIC | Age: 68
End: 2023-07-27
Payer: COMMERCIAL

## 2023-07-27 DIAGNOSIS — Z93.3 S/P COLOSTOMY (H): ICD-10-CM

## 2023-07-27 DIAGNOSIS — I26.93 SINGLE SUBSEGMENTAL PULMONARY EMBOLISM WITHOUT ACUTE COR PULMONALE (H): ICD-10-CM

## 2023-07-27 DIAGNOSIS — D64.9 ANEMIA, UNSPECIFIED TYPE: ICD-10-CM

## 2023-07-27 LAB
FOLATE SERPL-MCNC: >40 NG/ML (ref 4.6–34.8)
VIT B12 SERPL-MCNC: 402 PG/ML (ref 232–1245)

## 2023-07-27 PROCEDURE — 36415 COLL VENOUS BLD VENIPUNCTURE: CPT

## 2023-07-27 PROCEDURE — 82746 ASSAY OF FOLIC ACID SERUM: CPT

## 2023-07-27 PROCEDURE — 82607 VITAMIN B-12: CPT

## 2023-07-27 NOTE — TELEPHONE ENCOUNTER
[employer] forms signed by provider will be faxed to [phone]  Order # [id number]        Moraima Cummings Facilitator

## 2023-07-27 NOTE — TELEPHONE ENCOUNTER
[employer] forms signed by provider will be faxed to [phone]  Order # [id number]      Moraima Warner

## 2023-07-28 ENCOUNTER — MEDICAL CORRESPONDENCE (OUTPATIENT)
Dept: HEALTH INFORMATION MANAGEMENT | Facility: CLINIC | Age: 68
End: 2023-07-28

## 2023-07-28 ENCOUNTER — TELEPHONE (OUTPATIENT)
Dept: HEMATOLOGY | Facility: CLINIC | Age: 68
End: 2023-07-28
Payer: COMMERCIAL

## 2023-07-28 ENCOUNTER — TELEPHONE (OUTPATIENT)
Dept: FAMILY MEDICINE | Facility: CLINIC | Age: 68
End: 2023-07-28

## 2023-07-28 DIAGNOSIS — Z53.9 DIAGNOSIS NOT YET DEFINED: Primary | ICD-10-CM

## 2023-07-28 DIAGNOSIS — Z93.3 S/P COLOSTOMY (H): ICD-10-CM

## 2023-07-28 DIAGNOSIS — D64.9 ANEMIA, UNSPECIFIED TYPE: Primary | ICD-10-CM

## 2023-07-28 PROCEDURE — G0179 MD RECERTIFICATION HHA PT: HCPCS | Performed by: INTERNAL MEDICINE

## 2023-07-28 NOTE — TELEPHONE ENCOUNTER
Forms/Letter Request    Type of form/letter:  [facility] Order [id number]    Have you been seen for this request: N/A    Do we have the form/letter: Yes: Will place form on providers desk for review/signature    When is form/letter needed by: asap    How would you like the form/letter returned: Fax : [phone]

## 2023-07-28 NOTE — PROGRESS NOTES
[facility]  [address] [place] [address] [address] [address]  Main: [phone], Fax: [phone]    Telephone Note:    Patient: Holly Muir  MRN: [id number]  : 1955  Date of this note written: 2023    Holly has been on oral iron replacement since 2023. She confirms numerous times to this writer that she has been taking a oral iron supplement daily since 2023. Holly also reports that she has no bright red blood or black tarry stools in her colostomy bag and she is not bleeding anywhere else.     Despite her being on daily oral iron supplement for the past 7+ months, her hemoglobin has dropped 1 gram since 2023 and she is not entirely iron replete. Differential for the cause of her ongoing anemia is extensive including anemia of chronic disease, iron deficiency, ongoing occult blood loss (last colonoscopy was in ), hemolysis, and many others....    This writer discuss this patient with Dr. Kulwinder Gonzalez, staff hematologist and medical director of our clinic. He is recommending to proceed with checking the following additional labs: Repeat CBC with differential, CMP, iron panel. Obtain Reticulocyte count, Soluble Transferrin Receptor, LDH, and a peripheral blood smear. Might also need to consider occult blood stool test.     This writer has spoken with the patient about the above and she is in agreement to proceed with additional lab testing. In the mean time, I have asked her to continue to take her iron supplement daily.     Component      Latest Ref Rng 2023  9:44 AM 2023  10:27 AM   WBC      4.0 - 11.0 10e3/uL 5.5     RBC Count      3.80 - 5.20 10e6/uL 3.42 (L)     Hemoglobin      11.7 - 15.7 g/dL 9.0 (L)     Hematocrit      35.0 - 47.0 % 29.3 (L)     MCV      78 - 100 fL 86     MCH      26.5 - 33.0 pg 26.3 (L)     MCHC      31.5 - 36.5 g/dL 30.7 (L)     RDW      10.0 - 15.0 % 16.0 (H)     Platelet Count      150 - 450 10e3/uL  339     Iron      37 - 145 ug/dL 49     Iron Binding Capacity      240 - 430 ug/dL 345     Iron Sat Index      15 - 46 % 14 (L)     Ferritin      11 - 328 ng/mL 51     Vitamin B12      232 - 1,245 pg/mL  402    Folate      4.6 - 34.8 ng/mL  >40.0 (H)         Carter Lan PA-C, MPAS  Physician Assistant  [facility].

## 2023-07-28 NOTE — TELEPHONE ENCOUNTER
[id number]  Holly Muir  68 year old female  [facility] Diagnosis: PE/Anemia  [facility] Provider: Carter Lan PA-C    RN called patient and assisted her in making a lab appointment at [facility]. RN confirmed with  lab that they will be able to take an occult blood stool sample.    Kalie Gonzalez RN, BSN, PCCN  Nurse Clinician    [facility]  [address] [place] [address], [address], [place] [address]   Office, direct: [phone]  Main office number: [phone]  Pronouns: She, her, hers

## 2023-08-01 ENCOUNTER — LAB (OUTPATIENT)
Dept: LAB | Facility: CLINIC | Age: 68
End: 2023-08-01
Payer: COMMERCIAL

## 2023-08-01 DIAGNOSIS — D64.9 ANEMIA, UNSPECIFIED TYPE: ICD-10-CM

## 2023-08-01 DIAGNOSIS — Z93.3 S/P COLOSTOMY (H): ICD-10-CM

## 2023-08-01 LAB
ALBUMIN SERPL BCG-MCNC: 3.9 G/DL (ref 3.5–5.2)
ALP SERPL-CCNC: 73 U/L (ref 35–104)
ALT SERPL W P-5'-P-CCNC: <5 U/L (ref 0–50)
ANION GAP SERPL CALCULATED.3IONS-SCNC: 11 MMOL/L (ref 7–15)
AST SERPL W P-5'-P-CCNC: 18 U/L (ref 0–45)
BASOPHILS # BLD AUTO: 0 10E3/UL (ref 0–0.2)
BASOPHILS NFR BLD AUTO: 0 %
BILIRUB SERPL-MCNC: 0.2 MG/DL
BUN SERPL-MCNC: 11.3 MG/DL (ref 8–23)
CALCIUM SERPL-MCNC: 9.4 MG/DL (ref 8.8–10.2)
CHLORIDE SERPL-SCNC: 95 MMOL/L (ref 98–107)
CREAT SERPL-MCNC: 0.78 MG/DL (ref 0.51–0.95)
DEPRECATED HCO3 PLAS-SCNC: 25 MMOL/L (ref 22–29)
EOSINOPHIL # BLD AUTO: 0.2 10E3/UL (ref 0–0.7)
EOSINOPHIL NFR BLD AUTO: 3 %
ERYTHROCYTE [DISTWIDTH] IN BLOOD BY AUTOMATED COUNT: 14.9 % (ref 10–15)
FERRITIN SERPL-MCNC: 56 NG/ML (ref 11–328)
GFR SERPL CREATININE-BSD FRML MDRD: 83 ML/MIN/1.73M2
GLUCOSE SERPL-MCNC: 129 MG/DL (ref 70–99)
HCT VFR BLD AUTO: 28.8 % (ref 35–47)
HGB BLD-MCNC: 9.2 G/DL (ref 11.7–15.7)
IMM GRANULOCYTES # BLD: 0 10E3/UL
IMM GRANULOCYTES NFR BLD: 0 %
IRON BINDING CAPACITY (ROCHE): 327 UG/DL (ref 240–430)
IRON SATN MFR SERPL: 11 % (ref 15–46)
IRON SERPL-MCNC: 35 UG/DL (ref 37–145)
LDH SERPL L TO P-CCNC: 145 U/L (ref 0–250)
LYMPHOCYTES # BLD AUTO: 1.5 10E3/UL (ref 0.8–5.3)
LYMPHOCYTES NFR BLD AUTO: 26 %
MCH RBC QN AUTO: 26.6 PG (ref 26.5–33)
MCHC RBC AUTO-ENTMCNC: 31.9 G/DL (ref 31.5–36.5)
MCV RBC AUTO: 83 FL (ref 78–100)
MONOCYTES # BLD AUTO: 0.3 10E3/UL (ref 0–1.3)
MONOCYTES NFR BLD AUTO: 6 %
NEUTROPHILS # BLD AUTO: 3.7 10E3/UL (ref 1.6–8.3)
NEUTROPHILS NFR BLD AUTO: 65 %
NRBC # BLD AUTO: 0 10E3/UL
NRBC BLD AUTO-RTO: 0 /100
PLATELET # BLD AUTO: 349 10E3/UL (ref 150–450)
POTASSIUM SERPL-SCNC: 4.3 MMOL/L (ref 3.4–5.3)
PROT SERPL-MCNC: 7 G/DL (ref 6.4–8.3)
RBC # BLD AUTO: 3.46 10E6/UL (ref 3.8–5.2)
RETICS # AUTO: 0.06 10E6/UL (ref 0.03–0.1)
RETICS/RBC NFR AUTO: 1.6 % (ref 0.5–2)
SODIUM SERPL-SCNC: 131 MMOL/L (ref 136–145)
WBC # BLD AUTO: 5.7 10E3/UL (ref 4–11)

## 2023-08-01 PROCEDURE — 85045 AUTOMATED RETICULOCYTE COUNT: CPT

## 2023-08-01 PROCEDURE — 85025 COMPLETE CBC W/AUTO DIFF WBC: CPT

## 2023-08-01 PROCEDURE — 83540 ASSAY OF IRON: CPT

## 2023-08-01 PROCEDURE — 83615 LACTATE (LD) (LDH) ENZYME: CPT

## 2023-08-01 PROCEDURE — 83550 IRON BINDING TEST: CPT

## 2023-08-01 PROCEDURE — 82728 ASSAY OF FERRITIN: CPT

## 2023-08-01 PROCEDURE — 80053 COMPREHEN METABOLIC PANEL: CPT

## 2023-08-01 PROCEDURE — 99207 BLOOD MORPHOLOGY PATHOLOGIST REVIEW: CPT | Performed by: STUDENT IN AN ORGANIZED HEALTH CARE EDUCATION/TRAINING PROGRAM

## 2023-08-01 PROCEDURE — 84238 ASSAY NONENDOCRINE RECEPTOR: CPT | Mod: 90

## 2023-08-01 PROCEDURE — 36415 COLL VENOUS BLD VENIPUNCTURE: CPT

## 2023-08-01 PROCEDURE — 99000 SPECIMEN HANDLING OFFICE-LAB: CPT

## 2023-08-02 LAB
PATH REPORT.COMMENTS IMP SPEC: NORMAL
PATH REPORT.COMMENTS IMP SPEC: NORMAL
PATH REPORT.FINAL DX SPEC: NORMAL
PATH REPORT.MICROSCOPIC SPEC OTHER STN: NORMAL
PATH REPORT.MICROSCOPIC SPEC OTHER STN: NORMAL
PATH REPORT.RELEVANT HX SPEC: NORMAL
STFR SERPL-MCNC: 3.3 MG/L

## 2023-08-03 DIAGNOSIS — Z53.9 DIAGNOSIS NOT YET DEFINED: Primary | ICD-10-CM

## 2023-08-03 PROCEDURE — G0179 MD RECERTIFICATION HHA PT: HCPCS | Performed by: INTERNAL MEDICINE

## 2023-08-03 NOTE — TELEPHONE ENCOUNTER
[employer] forms signed by provider will be faxed to [phone]        Moraima ROCA Visit Facilitator

## 2023-08-04 ENCOUNTER — TELEPHONE (OUTPATIENT)
Dept: HEMATOLOGY | Facility: CLINIC | Age: 68
End: 2023-08-04

## 2023-08-04 ENCOUNTER — MYC MEDICAL ADVICE (OUTPATIENT)
Dept: HEMATOLOGY | Facility: CLINIC | Age: 68
End: 2023-08-04

## 2023-08-04 DIAGNOSIS — Z93.3 S/P COLOSTOMY (H): ICD-10-CM

## 2023-08-04 DIAGNOSIS — D50.8 IRON DEFICIENCY ANEMIA SECONDARY TO INADEQUATE DIETARY IRON INTAKE: Primary | ICD-10-CM

## 2023-08-04 DIAGNOSIS — Z90.49 S/P PARTIAL COLECTOMY: ICD-10-CM

## 2023-08-04 RX ORDER — EPINEPHRINE 1 MG/ML
0.3 INJECTION, SOLUTION, CONCENTRATE INTRAVENOUS EVERY 5 MIN PRN
Status: CANCELLED | OUTPATIENT
Start: 2023-08-04

## 2023-08-04 RX ORDER — DIPHENHYDRAMINE HYDROCHLORIDE 50 MG/ML
50 INJECTION INTRAMUSCULAR; INTRAVENOUS
Status: CANCELLED
Start: 2023-08-04

## 2023-08-04 RX ORDER — HEPARIN SODIUM (PORCINE) LOCK FLUSH IV SOLN 100 UNIT/ML 100 UNIT/ML
5 SOLUTION INTRAVENOUS
Status: CANCELLED | OUTPATIENT
Start: 2023-08-04

## 2023-08-04 RX ORDER — MEPERIDINE HYDROCHLORIDE 25 MG/ML
25 INJECTION INTRAMUSCULAR; INTRAVENOUS; SUBCUTANEOUS EVERY 30 MIN PRN
Status: CANCELLED | OUTPATIENT
Start: 2023-08-04

## 2023-08-04 RX ORDER — ALBUTEROL SULFATE 0.83 MG/ML
2.5 SOLUTION RESPIRATORY (INHALATION)
Status: CANCELLED | OUTPATIENT
Start: 2023-08-04

## 2023-08-04 RX ORDER — ALBUTEROL SULFATE 90 UG/1
1-2 AEROSOL, METERED RESPIRATORY (INHALATION)
Status: CANCELLED
Start: 2023-08-04

## 2023-08-04 RX ORDER — HEPARIN SODIUM,PORCINE 10 UNIT/ML
5-20 VIAL (ML) INTRAVENOUS DAILY PRN
Status: CANCELLED | OUTPATIENT
Start: 2023-08-04

## 2023-08-04 NOTE — PROGRESS NOTES
[facility]  Center for Bleeding and Clotting Disorders  [facility] [place], [address], [place] [address]  Main: [phone], Fax: [phone]    Telephone Note:    Patient: Holly Muir  MRN: [id number]  : 1955  Date of this note written: 2023    This writer discussed this case with Dr. Kulwinder Gonzalez, Staff hematologist in details.   Dr. Gonzalez agrees with this writer that this patient's anemia is likely partially related to iron deficiency and partially likely related to anemia of chronic disease. Clearly, the patient is not absorbing Iron efficiently as she has been taking daily oral iron since 2023 and she is still low in Iron. Dr. Gonzalez also suggest that this patient could also have erythropoietin deficiency.     Dr. Gonzalez recommends the following:  Arrange to get IV iron infusion with InFED at 500 mg dose. (Order placed in Epic today by this writer)  Recheck labs in 1 month: CBC, Retic count, iron panel. (Order placed in Epic today by this writer)    If she has no improvement after IV iron infusion, then it is likely that she is erythropoietin deficient.     I have called the patient and communicated the above to her on 2023 at 09:45am and she is in agreement with the above plan.     Component      Latest Ref Rng 2023  12:40 PM   Sodium      136 - 145 mmol/L 131 (L)    Potassium      3.4 - 5.3 mmol/L 4.3    Chloride      98 - 107 mmol/L 95 (L)    Carbon Dioxide (CO2)      22 - 29 mmol/L 25    Anion Gap      7 - 15 mmol/L 11    Urea Nitrogen      8.0 - 23.0 mg/dL 11.3    Creatinine      0.51 - 0.95 mg/dL 0.78    Calcium      8.8 - 10.2 mg/dL 9.4    Glucose      70 - 99 mg/dL 129 (H)    Alkaline Phosphatase      35 - 104 U/L 73    AST      0 - 45 U/L 18    ALT      0 - 50 U/L <5    Protein Total      6.4 - 8.3 g/dL 7.0    Albumin      3.5 - 5.2 g/dL 3.9    Bilirubin Total      <=1.2 mg/dL 0.2    GFR Estimate      >60 mL/min/1.73m2 83    Iron      37 - 145  ug/dL 35 (L)    Iron Binding Capacity      240 - 430 ug/dL 327    Iron Sat Index      15 - 46 % 11 (L)    Lactate Dehydrogenase      0 - 250 U/L 145    Soluble Transferrin Receptor      1.9 - 4.4 mg/L 3.3    Ferritin      11 - 328 ng/mL 56       Peripheral blood smear report reviewed:  The red blood cells appear normochromic.  Poikilocytosis  includes rare dacryocytes and occasional elliptocytes.  Polychromasia is present, but not increased.  Rouleaux formation is not increased. The morphology of the platelets is normal. Lymphocyte morphology is polymorphous, with predominantly small and mature forms; reactive lymphocytes are present. The neutrophil series displays predominantly normal cytoplasmic granularity and unremarkable nuclear morphology.     CBC WITH DIFFERENTIAL(08/01/2023 12:46 PM CDT):                                        RESULT VALUE REF. RANGE UNITS   WBC Count   Hemoglobin    Hematocrit   Platelet Count   RBC Count   MCV  MCH  MCHC  RDW 5.7                 (NORMAL)     9.2                  ( L )      28.8                  ( L )  349                 (NORMAL)   3.46                  ( L )       83                 (NORMAL)     26.6                 (NORMAL)     31.9                 (NORMAL)     14.9                 (NORMAL) 4.0-11.0  11.7-15.7  35.0-47.0  150-450  3.80-5.20    26.5-33.0  31.5-36.5  10.0-15.0 10e3/uL  g/dL  %  10e3/uL  10e6/uL  fL  pg  g/dL  %   % Neutrophils  % Lymphocytes  % Monocytes  % Eosinophils  % Basophils  % Immature Granulocytes  Absolute Neutrophils  Absolute Lymphocytes  Absolute Monocytes  Absolute Eosinophils  Absolute Basophils  Absolute Immature Granulocytes  NRBCs per 100 WBC  Absolute NRBCs 65  26  6  3  0  0  3.7                 (NORMAL)  1.5                 (NORMAL)  0.3                 (NORMAL)  0.2                 (NORMAL)  0.0                 (NORMAL)  0.0                 (NORMAL)  0               (NORMAL)  0.0                 ()  N/A  N/A  N/A  N/A  N/A  N/A  1.6-8.3  0.8-5.3  0.0-1.3  0.0-0.7  0.0-0.2  <=0.4  <1  <=0.0 %  %  %  %  %  %  10e3/uL  10e3/uL  10e3/uL  10e3/uL  10e3/uL  10e3/uL  /100  10e3/uL     RETICULOCYTE COUNT (08/01/2023 12:46 PM CDT):  RESULT VALUE REF. RANGE UNITS   % Reticulocyte  Absolute Reticulocyte 1.6                 (NORMAL)  0.056                 (NORMAL) 0.5-2.0  0.025-0.095 %  10e6/uL       Carter Lan PA-C, MPAS  Physician Assistant  [facility] for Ble[facility].

## 2023-08-04 NOTE — TELEPHONE ENCOUNTER
[id number]  Holly Muir  68 year old female  CBCD Diagnosis: PE, anemia  CBCD Provider: Carter Lan PA-C    Incoming message from Carter Lan PA-C requesting that RN assist patient in coordinating Infed infusion and repeat labs 1 month after infusion.    RN called patient and left voicemail with call-back number.    Kalie Gonzalez RN, BSN, PCCN  Nurse Clinician    [facility]  [address] [place] [address], [address], [place] [address]   Office, direct: [phone]  Main office number: [phone]  Pronouns: She, her, hers    Addendum 8/4/2023 1:28 PM:    Incoming call from Holly. She wants to get the infusion completed at the [facility]. RN will message [facility] Prior Authorization Team.    Holly wants an appointment at [facility] on a Friday. Appointment made. Sooner appointment available at [facility], but patient prefers to take 9/8 appt.     She is now scheduled for infusion on 9/8 and labs on 10/6.    CHITRA RN

## 2023-08-07 NOTE — PROGRESS NOTES
[facility]  Center for Bleeding and Clotting Disorders  [facility] [address], [address], [place] [address]  Main: [phone], Fax: [phone]    Video Virtual Visit Note:    Patient: Holly Muir  MRN: [id number]  : 1955  MEG: August 10, 2023        Due to the ongoing COVID-19 outbreak, this visit was conducted by video, with the patient's approval.      Reason for visit:  Small, age indeterminate pulmonary embolism within segmental branch of the right lower lobe pulmonary arterial system on 2023.      Clinical History Summary:  Holly is a 68 year old female with a history of hysterectomy for cancer back in  for which ultimately underwent partial colectomy and creation of colostomy back around  (due to incontinence), who also has a history of schizoaffective disorder, chronic pain syndrome, type 2 DM, GERD, diabetic neuropathy, hyperlipidemia, anemia, hyponatremia, and chronic severe osteoarthritis of both knees with right worse than left, who was found to have small caliber pulmonary embolism in setting of co-current pneumonia found on 2023, currently on anticoagulation therapy with warfarin, participates in today's video visit for follow up. She was last seen by this writer back on 2023.    Thrombosis History Summary:   Since 2022, Holly has worsening of her chronic right knee pain and was found to have severe advanced osteoarthritis.   3/28/2023, saw Dr. Sanford Sheehan of Orthopedic surgery. At the time, her HA1C was too high for surgical management of her osteoarthritis.   Between 2023 to May 2023, her right knee pain was debilitating to the point where she sits at home most of the time. Елена ambulation or any range of motion of her right knee cause her to have pain. Thus she was rather immobile.   2023, she presented to the emergency department with right calf pain and some swelling. Right leg venous ultrasound was done and showed no DVT,  "no superficial thrombophlebitis and no popliteal cyst.   5/7/2023, she again presented to the emergency department at [facility] with chest pain, vomiting, light-headedness and diaphoresis. D-Dimer was elevated at 1.96. CTA chest showed:  Small, age-indeterminate pulmonary embolism within segmental branch of the right lower lobe pulmonary arterial system. Age-indeterminate right lower lobe consolidation, possibly an acute pneumonia. Mild interstitial edema. Small right pleural effusion. Mild subcarinal lymphadenopathy. Chest CT follow up recommended in 3-6 months.\" Bilateral lower extremity venous ultrasound showed no DVT. She was admitted and started on unfractionated heparin along with antibiotics for her pneumonia. TTE was done and showed normal functioning heart. She was subsequently discharged on apixaban on 5/10/2023.   6/6/2023, she had a follow up visit with Dr. Sanford Sheehan of [facility] who recommending that she should hold off of right total knee arthroplasty for 6-12 months considering her pulmonary embolic event found on 5/7/2023. He also recommended a hematology consultation.     Interim History:  Established care with this writer on 7/14/2023. I determined that her pulmonary embolism on 5/7/2023 was likely a provoked event. At the time, my plan was to check her apixaban blood level to ensure that she was absorbing her apixaban as expected given her history of partial colectomy. This was done and it actually showed that she is slightly supratherapeutic on the medication.   Also during her visit with this writer back in July 2023, she was noted to be anemic. An extensive workup about her anemia was performed by this writer with consultation with Dr. Kulwinder Gonzalez, staff hematologist. She has been arranged to get IV iron infusion. See my note on 8/4/2023 for details.   Additionally, I agreed with holding off right knee surgery until at least after 3 months of uninterrupted anticoagulation therapy. " My preliminary plan was to have her continue anticoagulation therapy until 2 months after her right total knee arthroplasty surgery.     She is currently scheduled for IV iron infusion on 9/8/2023 at [facility]. Will recheck labs in the beginning of Oct 2023.     She has a scheduled follow up visit with Dr. Sheehan about her right knee on 8/22/2023.     ROS:  Denies any bleeding complications. Specifically, no frequent epistaxis. No issues with oral mucosal bleeding. Denies any hematuria or blood in stools. Denies any shortness of breath. No chest pain. No cough. No fever.      Medications:   Current Outpatient Medications   Medication    ACETAMINOPHEN EXTRA STRENGTH 500 MG tablet    apixaban ANTICOAGULANT (ELIQUIS) 5 MG tablet    ARIPiprazole (ABILIFY) 15 MG tablet    blood glucose (NO BRAND SPECIFIED) test strip    blood glucose monitoring (NO BRAND SPECIFIED) meter device kit    blood glucose monitoring (ONE TOUCH ULTRA 2) meter device kit    blood glucose monitoring (ULTRA THIN 30G) lancets    buPROPion (WELLBUTRIN SR) 200 MG 12 hr tablet    busPIRone HCl (BUSPAR) 30 MG tablet    cetirizine (ZYRTEC) 10 MG tablet    Continuous Blood Gluc  (FREESTYLE CM 2 READER) GALINA    Continuous Blood Gluc Sensor (FREESTYLE CM 2 SENSOR) MISC    [employer] ASPIRIN ADULT LOW DOSE 81 MG chewable tablet    diclofenac (VOLTAREN) 1 % topical gel    ferrous gluconate (FERGON) 324 (38 Fe) MG tablet    gabapentin (NEURONTIN) 600 MG tablet    glimepiride (AMARYL) 2 MG tablet    insulin aspart (NOVOLOG FLEXPEN) 100 UNIT/ML pen    insulin glargine (LANTUS SOLOSTAR) 100 UNIT/ML pen    insulin pen needle (31G X 5 MM) 31G X 5 MM miscellaneous    metFORMIN (GLUCOPHAGE XR) 500 MG 24 hr tablet    Multiple Vitamin (DAILY-ISAAC MULTIVITAMIN) TABS    naloxone (NARCAN) 4 MG/0.1ML nasal spray    nystatin (MYCOSTATIN) 718201 UNIT/GM external powder    omeprazole (PRILOSEC) 20 MG DR capsule    oxyCODONE (ROXICODONE) 5  MG tablet    PARoxetine (PAXIL) 40 MG tablet    pioglitazone (ACTOS) 45 MG tablet    propranolol ER (INDERAL LA) 80 MG 24 hr capsule    ramipril (ALTACE) 5 MG capsule    rOPINIRole (REQUIP) 0.25 MG tablet    simvastatin (ZOCOR) 20 MG tablet    tiZANidine (ZANAFLEX) 2 MG tablet    traZODone (DESYREL) 50 MG tablet    vitamin C ([employer] VITAMIN C) 500 MG tablet    VITAMIN D3 25 MCG (1000 UT) tablet     No current facility-administered medications for this visit.       Allergies:   Allergies   Allergen Reactions    Morphine Sulfate Itching      PMH:   Past Medical History:   Diagnosis Date    Bilateral knee pain     Cataracts, both eyes 5/8/2013    Cervical cancer (H)     Chronic kidney disease, stage 3a (H) 11/17/2021    Diabetes     Diabetic retinopathy (H)     HTN     Inflammatory arthritis     Major depression     Memory loss     Nephropathy     OA (osteoarthritis) of knee 9/11/2013    Other and unspecified hyperlipidemia     Pterygium eye     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       Social History:   Deferred    Family History:  Deferred    Objective:  Visual Examination via Video:  Pleasant in no acute distress.  Normal work of breathing   A+O x 3    Labs:  Reviewed and are as described above.    Assessment:  In summary, Holly is a 68 year old female with a history of hysterectomy for cancer back in 1980s for which ultimately underwent partial colectomy and creation of colostomy back around 2002 (due to incontinence), who also has a history of schizoaffective disorder, chronic pain syndrome, type 2 DM, GERD, diabetic neuropathy, hyperlipidemia, anemia, hyponatremia, and chronic severe osteoarthritis of both knees with right worse than left, who was found to have small caliber pulmonary embolism in setting of co-current pneumonia found on 5/7/2023, currently on anticoagulation therapy with warfarin, participates in today's video visit for follow up.       Holly's small caliber pulmonary embolism back on 5/7/2023  was a provoked event as she was rather immobilized due to her right knee severe osteoarthritis. Additionally, at the time of the finding of pulmonary embolism, she also was found to have pneumonia, which also increases her risk for venous thromboembolism.       In regard to her anemia, an extensive workup was done in the past month and she likely has a combination of iron deficiency anemia and anemia of chronic disease.      Diagnosis:  Provoked small burden pulmonary embolism on 5/7/2023 without evidence of lower extremity DVT.   S/P partial colectomy in 2002 with colostomy creation while she was residing in [place].   Chronic anemic (at least since 2020). Likely a combination of iron deficiency and anemia of chronic disease.   Severe osteoarthritis of the right knee. Anticipating the need of right total knee arthroplasty in the near future.    Plan:  Her anticoagulation therapy plan is as follow:  Remain on apixaban at 5 mg PO BID dosing until 2 months after her right total knee arthroplasty surgery.   She does not need indefinite anticoagulation therapy but will keep her on it until 2 months after her right total knee arthroplasty surgery.  She is cleared from a hematological standpoint to proceed with right total knee arthroplasty surgery as of 8/7/2023.   For her right total knee arthroplasty surgery, she can simply hold her apixaban for 48 hours prior to the surgery and then restart it the day after her surgery assuming that she is hemostatically stable.     In regard to her anemia:  Please see my note from 8/4/2023 for details.   Briefly, she is scheduled for IV iron infusion on 9/8/2023 and then have labs rechecked in the beginning of Oct 2023.     Follow up clinic visit with me will depend on when her right total knee arthroplasty surgery is scheduled.       Video-Visit Details:  Type of service:  Video Visit  Video Start Time:  12:05  Video End Time (time video stopped): 12:15  Originating Location (pt.  Location): Home  Distant Location (provider location):  [facility] FOR BLEEDING AND CLOTTING DISORDERS   Mode of Communication:  Video Conference via [employer]      Carter Lan PA-C, MPAS  Physician Assistant  [facility] for Bleeding and Clotting Disorders.     20 minutes spent by me on the date of the encounter doing chart review, history and exam, documentation and further activities per the note

## 2023-08-09 ENCOUNTER — VIRTUAL VISIT (OUTPATIENT)
Dept: PHARMACY | Facility: CLINIC | Age: 68
End: 2023-08-09
Payer: COMMERCIAL

## 2023-08-09 DIAGNOSIS — F25.9 SCHIZOAFFECTIVE DISORDER, UNSPECIFIED TYPE (H): ICD-10-CM

## 2023-08-09 DIAGNOSIS — N18.31 ANEMIA DUE TO STAGE 3A CHRONIC KIDNEY DISEASE (H): Primary | ICD-10-CM

## 2023-08-09 DIAGNOSIS — F32.3 SEVERE MAJOR DEPRESSION WITH PSYCHOTIC FEATURES (H): ICD-10-CM

## 2023-08-09 DIAGNOSIS — F42.9 OBSESSIVE-COMPULSIVE DISORDER, UNSPECIFIED TYPE: ICD-10-CM

## 2023-08-09 DIAGNOSIS — E11.65 TYPE 2 DIABETES MELLITUS WITH HYPERGLYCEMIA, WITH LONG-TERM CURRENT USE OF INSULIN (H): ICD-10-CM

## 2023-08-09 DIAGNOSIS — Z79.4 TYPE 2 DIABETES MELLITUS WITH HYPERGLYCEMIA, WITH LONG-TERM CURRENT USE OF INSULIN (H): ICD-10-CM

## 2023-08-09 DIAGNOSIS — D63.1 ANEMIA DUE TO STAGE 3A CHRONIC KIDNEY DISEASE (H): Primary | ICD-10-CM

## 2023-08-09 PROCEDURE — 99607 MTMS BY PHARM ADDL 15 MIN: CPT | Performed by: PHARMACIST

## 2023-08-09 PROCEDURE — 99606 MTMS BY PHARM EST 15 MIN: CPT | Performed by: PHARMACIST

## 2023-08-09 NOTE — PATIENT INSTRUCTIONS
"Recommendations from today's MTM visit:                                                       [facility] contacted for refills. Keep visit on 8/18 with [facility]  Contacted endocrinology for sliding scale      Follow-up: 1 month    It was great speaking with you today.  I value your experience and would be very thankful for your time in providing feedback in our clinic survey. In the next few days, you may receive an email or text message from [email or web address] with a link to a survey related to your  clinical pharmacist.\"     To schedule another MTM appointment, please call the clinic directly or you may call the MTM scheduling line at [phone] or toll-free at [phone].     My Clinical Pharmacist's contact information:                                                      Please feel free to contact me with any questions or concerns you have.      Liz Calero, Pharm.D, [facility]  Medication Therapy Management Pharmacist      "

## 2023-08-09 NOTE — PROGRESS NOTES
Medication Therapy Management (MTM) Encounter    ASSESSMENT:                            Medication Adherence/Access: See below for considerations    Depression/OCD/Schizoaffective disorder: patient would benefit restarting her medications. Reached out to [facility] for renewals.    Type 2 Diabetes:  Blood sugars are improving per patient and last A1c.. Patient does not have her readings with her today. Patient would benefit from sliding scale instructions. Will contact endocrinology.    Osteoarthritis/chronic pain: waiting further evaluation      Anemia: planned iron infusion for 9/8  PLAN:                          [facility] contacted for refills   Contacted endocrinology for sliding scale    Follow-up: 1 month    SUBJECTIVE/OBJECTIVE:                          Holly Muir is a 68 year old female called for a follow-up visit.  Today's visit is a follow-up MTM visit from 7/5/2023.     Reason for visit:  Med review follow up    Allergies/ADRs: Reviewed in chart  Past Medical History: Reviewed in chart  Tobacco: She reports that she quit smoking about 47 years ago. Her smoking use included cigarettes. She started smoking about 48 years ago. She has never used smokeless tobacco.  Alcohol: not currently using  Lives between two daughters.     Medication Adherence/Access: daughters help manage medications. Uses a pill box. Patient is not very familiar with her medications.  Per patient, daughters hand her her medications to her.  Has timer on her phone to remind her when to take her insulin.  The patient fills medications at [facility]: NO, [facility]    Depression/OCD/Schizoaffective disorder:  Transitioning to a new therapist and hasn't had her medications and is having auditory and visual hallucinations.   Patient was seeing a psychiatrist at [facility] and is transitioning to a [facility] provider. Doesn't have an appointment until November with [facility].  Has an appointment scheduled with  [facility] on 8/18. [facility] refilled Abilify on 8/8 for 10 day supply  Paroxetine (last filled January per pharmacy), bupropion (last filled in February per pharmacy), buspirone (last filled February per pharmacy), trazodone-last refilled 3/15/2023 for 90 day supply  Has been dizzy and had some nausea since being off of her medications    Type 2 Diabetes:  Lantus (gives around 9pm) 30-35 units; if blood sugar >/=200 taking 35 units if blood sugar <200 taking 30 units  Novolog sliding scale-does not have a scale that she follows   metformin ER 1000 mg in the morning, 500 mg at night  glimepiride 4 mg 2 times daily  pioglitazone 45 mg once daily    CGM: reports her sugars have been good  Symptoms of high blood sugar? none; had 1 low sugar last month-took too much Novolog  Eye exam: due  Foot exam: due  Diet/Exercise: family cooks.   Aspirin: Taking 81 mg daily and denies side effects.   Statin: Yes: simvastatin 20 mg daily   ACEi/ARB: Yes: ramipril 5 mg daily  Urine Albumin:   Lab Results   Component Value Date    UMALCR 40.70 (H) 04/27/2023      Lab Results   Component Value Date    A1C 7.9 07/14/2023    A1C 8.7 04/26/2023    A1C 9.1 01/19/2023    A1C 8.5 07/14/2022    A1C 10.1 04/22/2022    A1C 8.9 11/12/2021    A1C 7.7 01/06/2021    A1C 7.9 12/11/2020    A1C 10.6 08/24/2020    A1C 9.5 06/18/2020     Osteoarthritis/chronic pain:    Oxycodone 5 mg every three times daily as needed-taking three times daily   Acetaminophen 1000 mg every 8 hours (max 4g per day)  Diclofenac gel-helps with the pain    Is planning on having knee surgery. Sees ortho on 8/22.     Anemia:   Ferrous gluconate 324 mg once daily plus vitamin C daily. Will be receiving IV iron. Infusion scheduled for 9/8.  No concerns today  Ferritin   Date Value Ref Range Status   08/01/2023 56 11 - 328 ng/mL Final   10/14/2020 40 8 - 252 ng/mL Final     Iron   Date Value Ref Range Status   08/01/2023 35 (L) 37 - 145 ug/dL Final   10/14/2020 43 35 - 180 ug/dL  Final     Iron Binding Cap   Date Value Ref Range Status   10/14/2020 434 (H) 240 - 430 ug/dL Final     Iron Binding Capacity   Date Value Ref Range Status   08/01/2023 327 240 - 430 ug/dL Final       Today's Vitals: There were no vitals taken for this visit.  ----------------  I spent 20 minutes with this patient today. All changes were made via collaborative practice agreement with Dr. King. A copy of the visit note was provided to the patient's provider(s).    A summary of these recommendations was sent via [employer].    Telemedicine Visit Details  Type of service:  Telephone visit  Start Time: 8:33AM  End Time: 8:53AM     Medication Therapy Recommendations  Severe major depression with psychotic features (H)    Current Medication: PARoxetine (PAXIL) 40 MG tablet   Rationale: Medication product not available - Adherence - Adherence   Recommendation: Provide Adherence Intervention   Status: Patient Agreed - Adherence/Education

## 2023-08-10 ENCOUNTER — VIRTUAL VISIT (OUTPATIENT)
Dept: HEMATOLOGY | Facility: CLINIC | Age: 68
End: 2023-08-10
Attending: PHYSICIAN ASSISTANT
Payer: COMMERCIAL

## 2023-08-10 DIAGNOSIS — D64.9 ANEMIA, UNSPECIFIED TYPE: ICD-10-CM

## 2023-08-10 DIAGNOSIS — E88.819 INSULIN RESISTANCE: ICD-10-CM

## 2023-08-10 DIAGNOSIS — Z86.711 HISTORY OF PULMONARY EMBOLISM: Primary | ICD-10-CM

## 2023-08-10 PROCEDURE — 99213 OFFICE O/P EST LOW 20 MIN: CPT | Mod: VID | Performed by: PHYSICIAN ASSISTANT

## 2023-08-10 NOTE — PATIENT INSTRUCTIONS
"Holly,    It was nice to see you via video visit today.    Below is a summary of our plan:  Continue with taking your apixaban (Eliquis) at 5 mg by mouth every 12 hours.  You are cleared from a hematological standpoint to proceed with right total knee replacement surgery.   Once you have a date of your surgery, you can simply stop your Eliquis for 48 hours prior to your surgery and then the orthopedic team will decide on when to restart you on it after the surgery.   My current plan is to have you stay on Eliquis for 2 months after your right total knee replacement surgery and then you can stop all \"blood thinning\" medication thereafter.   For your anemia, you are scheduled to get IV iron infusion on 9/8/2023 and then we will recheck labs in the beginning of Oct 2023.   If you should have any further questions, or experience any unusual bleeding issues or if you should need any invasive or surgical procedures in the future, please call us at [phone] and ask to speak to a nursing staff.  Follow up appointment with me will depend on when you are getting your surgery. I will be in touch with you about the timing of this once we know more about your surgery date.     Thank you once again in choosing our clinic as part of your healthcare team.      Carter Lan PA-C, MPAS  Physician Assistant  [facility]ot[facility].     "

## 2023-08-10 NOTE — PROGRESS NOTES
Patient was contacted to complete the pre-visit call prior to their video visit with the provider.      Allergies and medications were reviewed.    I thanked them for their time to cover this information     Alexandro Leigh CMA

## 2023-08-10 NOTE — LETTER
[facility]  [facility]  [facility] [address], [address], [place] [address]  Main: [phone], Fax: [phone]    Video Virtual Visit Note:    Patient: Holly Muir  MRN: [id number]  : 1955  MEG: August 10, 2023        Due to the ongoing COVID-19 outbreak, this visit was conducted by video, with the patient's approval.      Reason for visit:  Small, age indeterminate pulmonary embolism within segmental branch of the right lower lobe pulmonary arterial system on 2023.      Clinical History Summary:  Holly is a 68 year old female with a history of hysterectomy for cancer back in  for which ultimately underwent partial colectomy and creation of colostomy back around  (due to incontinence), who also has a history of schizoaffective disorder, chronic pain syndrome, type 2 DM, GERD, diabetic neuropathy, hyperlipidemia, anemia, hyponatremia, and chronic severe osteoarthritis of both knees with right worse than left, who was found to have small caliber pulmonary embolism in setting of co-current pneumonia found on 2023, currently on anticoagulation therapy with warfarin, participates in today's video visit for follow up. She was last seen by this writer back on 2023.    Thrombosis History Summary:   Since 2022, Holly has worsening of her chronic right knee pain and was found to have severe advanced osteoarthritis.   3/28/2023, saw Dr. Sanford Sheehan of Orthopedic surgery. At the time, her HA1C was too high for surgical management of her osteoarthritis.   Between 2023 to May 2023, her right knee pain was debilitating to the point where she sits at home most of the time. Елена ambulation or any range of motion of her right knee cause her to have pain. Thus she was rather immobile.   2023, she presented to the emergency department with right calf pain and some swelling. Right leg venous ultrasound was done and showed no  "DVT, no superficial thrombophlebitis and no popliteal cyst.   5/7/2023, she again presented to the emergency department at [facility] with chest pain, vomiting, light-headedness and diaphoresis. D-Dimer was elevated at 1.96. CTA chest showed:  Small, age-indeterminate pulmonary embolism within segmental branch of the right lower lobe pulmonary arterial system. Age-indeterminate right lower lobe consolidation, possibly an acute pneumonia. Mild interstitial edema. Small right pleural effusion. Mild subcarinal lymphadenopathy. Chest CT follow up recommended in 3-6 months.\" Bilateral lower extremity venous ultrasound showed no DVT. She was admitted and started on unfractionated heparin along with antibiotics for her pneumonia. TTE was done and showed normal functioning heart. She was subsequently discharged on apixaban on 5/10/2023.   6/6/2023, she had a follow up visit with Dr. Sanford Sheehan of [facility] who recommending that she should hold off of right total knee arthroplasty for 6-12 months considering her pulmonary embolic event found on 5/7/2023. He also recommended a hematology consultation.     Interim History:  Established care with this writer on 7/14/2023. I determined that her pulmonary embolism on 5/7/2023 was likely a provoked event. At the time, my plan was to check her apixaban blood level to ensure that she was absorbing her apixaban as expected given her history of partial colectomy. This was done and it actually showed that she is slightly supratherapeutic on the medication.   Also during her visit with this writer back in July 2023, she was noted to be anemic. An extensive workup about her anemia was performed by this writer with consultation with Dr. Kulwinder Gonzalez, staff hematologist. She has been arranged to get IV iron infusion. See my note on 8/4/2023 for details.   Additionally, I agreed with holding off right knee surgery until at least after 3 months of uninterrupted anticoagulation " therapy. My preliminary plan was to have her continue anticoagulation therapy until 2 months after her right total knee arthroplasty surgery.     She is currently scheduled for IV iron infusion on 9/8/2023 at [facility]. Will recheck labs in the beginning of Oct 2023.     She has a scheduled follow up visit with Dr. Sheehan about her right knee on 8/22/2023.     ROS:  Denies any bleeding complications. Specifically, no frequent epistaxis. No issues with oral mucosal bleeding. Denies any hematuria or blood in stools. Denies any shortness of breath. No chest pain. No cough. No fever.      Medications:   Current Outpatient Medications   Medication     ACETAMINOPHEN EXTRA STRENGTH 500 MG tablet     apixaban ANTICOAGULANT (ELIQUIS) 5 MG tablet     ARIPiprazole (ABILIFY) 15 MG tablet     blood glucose (NO BRAND SPECIFIED) test strip     blood glucose monitoring (NO BRAND SPECIFIED) meter device kit     blood glucose monitoring (ONE TOUCH ULTRA 2) meter device kit     blood glucose monitoring (ULTRA THIN 30G) lancets     buPROPion (WELLBUTRIN SR) 200 MG 12 hr tablet     busPIRone HCl (BUSPAR) 30 MG tablet     cetirizine (ZYRTEC) 10 MG tablet     Continuous Blood Gluc  (FREESTYLE CM 2 READER) GALINA     Continuous Blood Gluc Sensor (FREESTYLE CM 2 SENSOR) MISC     [employer] ASPIRIN ADULT LOW DOSE 81 MG chewable tablet     diclofenac (VOLTAREN) 1 % topical gel     ferrous gluconate (FERGON) 324 (38 Fe) MG tablet     gabapentin (NEURONTIN) 600 MG tablet     glimepiride (AMARYL) 2 MG tablet     insulin aspart (NOVOLOG FLEXPEN) 100 UNIT/ML pen     insulin glargine (LANTUS SOLOSTAR) 100 UNIT/ML pen     insulin pen needle (31G X 5 MM) 31G X 5 MM miscellaneous     metFORMIN (GLUCOPHAGE XR) 500 MG 24 hr tablet     Multiple Vitamin (DAILY-ISAAC MULTIVITAMIN) TABS     naloxone (NARCAN) 4 MG/0.1ML nasal spray     nystatin (MYCOSTATIN) 880007 UNIT/GM external powder     omeprazole (PRILOSEC) 20 MG   capsule     oxyCODONE (ROXICODONE) 5 MG tablet     PARoxetine (PAXIL) 40 MG tablet     pioglitazone (ACTOS) 45 MG tablet     propranolol ER (INDERAL LA) 80 MG 24 hr capsule     ramipril (ALTACE) 5 MG capsule     rOPINIRole (REQUIP) 0.25 MG tablet     simvastatin (ZOCOR) 20 MG tablet     tiZANidine (ZANAFLEX) 2 MG tablet     traZODone (DESYREL) 50 MG tablet     vitamin C ([employer]S VITAMIN C) 500 MG tablet     VITAMIN D3 25 MCG (1000 UT) tablet     No current facility-administered medications for this visit.       Allergies:   Allergies   Allergen Reactions     Morphine Sulfate Itching      PMH:   Past Medical History:   Diagnosis Date     Bilateral knee pain      Cataracts, both eyes 5/8/2013     Cervical cancer (H)      Chronic kidney disease, stage 3a (H) 11/17/2021     Diabetes      Diabetic retinopathy (H)      HTN      Inflammatory arthritis      Major depression      Memory loss      Nephropathy      OA (osteoarthritis) of knee 9/11/2013     Other and unspecified hyperlipidemia      Pterygium eye      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       Social History:   Deferred    Family History:  Deferred    Objective:  Visual Examination via Video:  Pleasant in no acute distress.  Normal work of breathing   A+O x 3    Labs:  Reviewed and are as described above.    Assessment:  In summary, Holly is a 68 year old female with a history of hysterectomy for cancer back in 1980s for which ultimately underwent partial colectomy and creation of colostomy back around 2002 (due to incontinence), who also has a history of schizoaffective disorder, chronic pain syndrome, type 2 DM, GERD, diabetic neuropathy, hyperlipidemia, anemia, hyponatremia, and chronic severe osteoarthritis of both knees with right worse than left, who was found to have small caliber pulmonary embolism in setting of co-current pneumonia found on 5/7/2023, currently on anticoagulation therapy with warfarin, participates in today's video visit for follow up.        Holly's small caliber pulmonary embolism back on 5/7/2023 was a provoked event as she was rather immobilized due to her right knee severe osteoarthritis. Additionally, at the time of the finding of pulmonary embolism, she also was found to have pneumonia, which also increases her risk for venous thromboembolism.       In regard to her anemia, an extensive workup was done in the past month and she likely has a combination of iron deficiency anemia and anemia of chronic disease.      Diagnosis:  Provoked small burden pulmonary embolism on 5/7/2023 without evidence of lower extremity DVT.   S/P partial colectomy in 2002 with colostomy creation while she was residing in [place].   Chronic anemic (at least since 2020). Likely a combination of iron deficiency and anemia of chronic disease.   Severe osteoarthritis of the right knee. Anticipating the need of right total knee arthroplasty in the near future.    Plan:  Her anticoagulation therapy plan is as follow:  Remain on apixaban at 5 mg PO BID dosing until 2 months after her right total knee arthroplasty surgery.   She does not need indefinite anticoagulation therapy but will keep her on it until 2 months after her right total knee arthroplasty surgery.  She is cleared from a hematological standpoint to proceed with right total knee arthroplasty surgery as of 8/7/2023.   For her right total knee arthroplasty surgery, she can simply hold her apixaban for 48 hours prior to the surgery and then restart it the day after her surgery assuming that she is hemostatically stable.     In regard to her anemia:  Please see my note from 8/4/2023 for details.   Briefly, she is scheduled for IV iron infusion on 9/8/2023 and then have labs rechecked in the beginning of Oct 2023.     Follow up clinic visit with me will depend on when her right total knee arthroplasty surgery is scheduled.       Video-Visit Details:  Type of service:  Video Visit  Video Start Time:  12:05  Video  End Time (time video stopped): 12:15  Originating Location (pt. Location): Home  Distant Location (provider location):  [facility]   Mode of Communication:  Video Conference via AmericanJohn Lan PA-C, MPAS  Physician Assistant  [facility].     20 minutes spent by me on the date of the encounter doing chart review, history and exam, documentation and further activities per the note      Patient was contacted to complete the pre-visit call prior to their video visit with the provider.      Allergies and medications were reviewed.    I thanked them for their time to cover this information     Alexandro Leigh, CMA

## 2023-08-11 DIAGNOSIS — I10 HTN, GOAL BELOW 140/90: ICD-10-CM

## 2023-08-11 RX ORDER — RAMIPRIL 5 MG/1
CAPSULE ORAL
Qty: 90 CAPSULE | Refills: 2 | Status: SHIPPED | OUTPATIENT
Start: 2023-08-11 | End: 2024-04-29

## 2023-08-15 RX ORDER — PIOGLITAZONEHYDROCHLORIDE 45 MG/1
45 TABLET ORAL DAILY
Qty: 90 TABLET | Refills: 1 | Status: SHIPPED | OUTPATIENT
Start: 2023-08-15 | End: 2023-12-15

## 2023-08-15 NOTE — TELEPHONE ENCOUNTER
pioglitazone (ACTOS) 45 MG tablet 90 tablet 3 8/25/2022      Last Office Visit : 7-5-2023  Future Office visit:  none    Kathleen M Doege RN

## 2023-08-17 DIAGNOSIS — M17.11 PRIMARY OSTEOARTHRITIS OF RIGHT KNEE: ICD-10-CM

## 2023-08-17 DIAGNOSIS — M25.561 CHRONIC PAIN OF RIGHT KNEE: ICD-10-CM

## 2023-08-17 DIAGNOSIS — F11.90 OPIOID USE DISORDER: ICD-10-CM

## 2023-08-17 DIAGNOSIS — G89.29 CHRONIC PAIN OF RIGHT KNEE: ICD-10-CM

## 2023-08-17 RX ORDER — OXYCODONE HYDROCHLORIDE 5 MG/1
5 TABLET ORAL 3 TIMES DAILY PRN
Qty: 90 TABLET | Refills: 0 | Status: SHIPPED | OUTPATIENT
Start: 2023-08-18 | End: 2023-09-07

## 2023-08-18 DIAGNOSIS — E11.40 TYPE 2 DIABETES MELLITUS WITH DIABETIC NEUROPATHY (H): ICD-10-CM

## 2023-08-18 RX ORDER — INSULIN GLARGINE 100 [IU]/ML
INJECTION, SOLUTION SUBCUTANEOUS
Qty: 45 ML | Refills: 1 | Status: SHIPPED | OUTPATIENT
Start: 2023-08-18 | End: 2023-11-27

## 2023-08-18 NOTE — TELEPHONE ENCOUNTER
insulin glargine (LANTUS SOLOSTAR) 100 UNIT/ML pen   45 mL 1 3/7/2023       7/5/2023  [facility]    Elizabeth Medrano MD  Endocrinology, Diabetes, and Metabolism    Routed because: endo triage to fill

## 2023-08-22 ENCOUNTER — TELEPHONE (OUTPATIENT)
Dept: ORTHOPEDICS | Facility: CLINIC | Age: 68
End: 2023-08-22

## 2023-08-22 ENCOUNTER — OFFICE VISIT (OUTPATIENT)
Dept: ORTHOPEDICS | Facility: CLINIC | Age: 68
End: 2023-08-22
Payer: COMMERCIAL

## 2023-08-22 DIAGNOSIS — J30.2 SEASONAL ALLERGIC RHINITIS, UNSPECIFIED TRIGGER: ICD-10-CM

## 2023-08-22 DIAGNOSIS — D50.9 IRON DEFICIENCY ANEMIA, UNSPECIFIED IRON DEFICIENCY ANEMIA TYPE: ICD-10-CM

## 2023-08-22 DIAGNOSIS — M17.11 PRIMARY LOCALIZED OSTEOARTHRITIS OF RIGHT KNEE: ICD-10-CM

## 2023-08-22 PROCEDURE — 99213 OFFICE O/P EST LOW 20 MIN: CPT | Performed by: ORTHOPAEDIC SURGERY

## 2023-08-22 RX ORDER — CETIRIZINE HYDROCHLORIDE 10 MG/1
10 TABLET ORAL DAILY
Qty: 90 TABLET | Refills: 1 | Status: SHIPPED | OUTPATIENT
Start: 2023-08-22 | End: 2023-09-27

## 2023-08-22 RX ORDER — FERROUS GLUCONATE 324(38)MG
324 TABLET ORAL
Qty: 90 TABLET | Refills: 3 | OUTPATIENT
Start: 2023-08-22

## 2023-08-22 RX ORDER — ASCORBIC ACID 500 MG
TABLET ORAL
Qty: 45 TABLET | Refills: 3 | OUTPATIENT
Start: 2023-08-22

## 2023-08-22 ASSESSMENT — PAIN SCALES - GENERAL: PAINLEVEL: EXTREME PAIN (8)

## 2023-08-22 NOTE — TELEPHONE ENCOUNTER
Pt needs pre-hab PT.    Procedure: Right total knee arthroplasty  Facility: [facility]  Length: ? minutes  Anesthesia: General  Post-op appointments needed: 2 weeks nurse or PA, 6 weeks with provider only.  Surgery packet/instructions given to patient?  Yes     Servando Reed RN

## 2023-08-22 NOTE — NURSING NOTE
Holly Muir's chief complaint for this visit includes:  Chief Complaint   Patient presents with    RECHECK     TKA discuss, clearance by Hematology given.        Referring Provider:  No referring provider defined for this encounter.    There were no vitals taken for this visit.  Extreme Pain (8)   Global Mental Health Score: (P) 8  Global Physical Health Score: (P) 8  PROMIS TOTAL - SUBSCORES: (P) 16  [facility]: 2, KOOSJR: 28.25       Pain: 8/10  Concerns: Hoping for surgery date now that she is cleared by her hematologist.     Martinez Patel, ATC

## 2023-08-22 NOTE — LETTER
8/22/2023         RE: Holly Muir  [address] [address] [id number]        Dear Colleague,    Thank you for referring your patient, Holly Muir, to the [facility]. Please see a copy of my visit note below.    Dee returns today to discuss proceeding with total knee.  At previous visits we talked about needing to get her hemoglobin A1c down less than 8.  She has been able to do that.  We also discussed her history of DVT and PE.  She saw hematology and there is a plan in the chart for her postoperative anticoagulation and she is cleared from that standpoint as well.  She is very eager to schedule total knee.  We went through the entire risk and benefits discussion.  We discussed the implants, the procedure, the risks and benefits, and the post-operative course.  We discussed blood clots, blood clots to the lungs, injury to blood vessels and nerves, dislocation, infection, and leg length difference.  We discussed that as part of the routine part of surgical care a qualified assist may finish closing the wound after I have left the room. All the patients questions were answered to the best of my ability.  Assessment: Advanced right knee osteoarthritis associated with severe symptoms despite comprehensive nonoperative management.  She is done everything asked of her to reduce her perioperative risk profile.    Plan: Prehab visit to physical therapy.  Okay to schedule right total knee.      Again, thank you for allowing me to participate in the care of your patient.        Sincerely,        Sanford hSeehan MD

## 2023-08-22 NOTE — PROGRESS NOTES
Dee returns today to discuss proceeding with total knee.  At previous visits we talked about needing to get her hemoglobin A1c down less than 8.  She has been able to do that.  We also discussed her history of DVT and PE.  She saw hematology and there is a plan in the chart for her postoperative anticoagulation and she is cleared from that standpoint as well.  She is very eager to schedule total knee.  We went through the entire risk and benefits discussion.  We discussed the implants, the procedure, the risks and benefits, and the post-operative course.  We discussed blood clots, blood clots to the lungs, injury to blood vessels and nerves, dislocation, infection, and leg length difference.  We discussed that as part of the routine part of surgical care a qualified assist may finish closing the wound after I have left the room. All the patients questions were answered to the best of my ability.  Assessment: Advanced right knee osteoarthritis associated with severe symptoms despite comprehensive nonoperative management.  She is done everything asked of her to reduce her perioperative risk profile.    Plan: Prehab visit to physical therapy.  Okay to schedule right total knee.

## 2023-08-22 NOTE — TELEPHONE ENCOUNTER
Date Scheduled: 10-2-23  Facility: Surgery Locations: [facility]  Surgeon: Dr. Sheehan   Post-op appointment scheduled:    scheduled?: No  Surgery packet/instructions confirmed received?  Yes- please call to review packet with patient and her daughter  Pre op physical/PAC appointment: 9/15/23 Dr. Kimber Gomez  Special Considerations:     Zenaida Umanzro  Surgery Scheduling Coordinator  Ph: [phone]

## 2023-08-24 ENCOUNTER — TRANSFERRED RECORDS (OUTPATIENT)
Dept: HEALTH INFORMATION MANAGEMENT | Facility: CLINIC | Age: 68
End: 2023-08-24
Payer: COMMERCIAL

## 2023-08-30 ENCOUNTER — NURSE TRIAGE (OUTPATIENT)
Dept: FAMILY MEDICINE | Facility: CLINIC | Age: 68
End: 2023-08-30

## 2023-08-30 NOTE — TELEPHONE ENCOUNTER
"Nurse Triage SBAR    Is this a 2nd Level Triage? NO    Situation: Severe pain on right knee.    Background: Pt anorectoplasty of right knee on October 2023.  T has had pain before but is currently severe.    Assessment: Pt is crying on the phone. States \"I cannot take this.\" States that pain is severe.   Pt took oxycodone about 30 minutes ago and it not helping.   Pt states it is hard to walk.    Protocol Recommended Disposition:   Go To Office Now    Recommendation: Advised pt to be seen right away In the ED to better manage her pain since it is severe.  Pt said she will go in to be seen.         Does the patient meet one of the following criteria for ADS visit consideration? 16+ years old, with an [facility] PCP     TIP  Providers, please consider if this condition is appropriate for management at one of our Acute and Diagnostic Services sites.     If patient is a good candidate, please use dotphrase <dot>triageresponse and select Refer to ADS to document.    Reason for Disposition   SEVERE pain (e.g., excruciating, unable to walk) and not improved after 2 hours of pain medicine   SEVERE pain (e.g., excruciating, unable to walk)    Additional Information   Negative: Followed an injury   Negative: Thigh or calf pain is main symptom   Negative: Thigh or calf swelling is main symptom   Negative: Foot pain is main symptom   Negative: Fever and red area (or area very tender to touch)   Negative: Swollen joint and fever   Negative: Thigh or calf pain and only 1 side and present > 1 hour   Negative: Thigh or calf swelling and only 1 side   Negative: Patient sounds very sick or weak to the triager   Negative: Followed a knee injury   Negative: Thigh or calf pain and only 1 side and present > 1 hour   Negative: Thigh or calf swelling and only 1 side   Negative: Patient sounds very sick or weak to the triager    Answer Assessment - Initial Assessment Questions  1. ONSET: \"When did the pain start?\"       Has had the pain for a " "while. Unable to determine since when it got severe  2. LOCATION: \"Where is the pain located?\"       Right knee that got the surgery on October  3. PAIN: \"How bad is the pain?\"    (Scale 1-10; or mild, moderate, severe)   - MILD (1-3): doesn't interfere with normal activities.    - MODERATE (4-7): interferes with normal activities (e.g., work or school) or awakens from sleep, limping.    - SEVERE (8-10): excruciating pain, unable to do any normal activities, unable to walk.       Severe. Cannot walk or exercise  4. WORK OR EXERCISE: \"Has there been any recent work or exercise that involved this part of the body?\"       Surgery on 10/2023  5. CAUSE: \"What do you think is causing the ankle pain?\"      Knee surgery  6. OTHER SYMPTOMS: \"Do you have any other symptoms?\" (e.g., calf pain, rash, fever, swelling)      no  7. PREGNANCY: \"Is there any chance you are pregnant?\" \"When was your last menstrual period?\"      *No Answer*    Answer Assessment - Initial Assessment Questions  1. LOCATION and RADIATION: \"Where is the pain located?\"       Right knee  2. QUALITY: \"What does the pain feel like?\"  (e.g., sharp, dull, aching, burning)      *No Answer*  3. SEVERITY: \"How bad is the pain?\" \"What does it keep you from doing?\"   (Scale 1-10; or mild, moderate, severe)    -  MILD (1-3): doesn't interfere with normal activities     -  MODERATE (4-7): interferes with normal activities (e.g., work or school) or awakens from sleep, limping     -  SEVERE (8-10): excruciating pain, unable to do any normal activities, unable to walk      severe  4. ONSET: \"When did the pain start?\" \"Does it come and go, or is it there all the time?\"      *No Answer*  5. RECURRENT: \"Have you had this pain before?\" If Yes, ask: \"When, and what happened then?\"      *No Answer*  6. SETTING: \"Has there been any recent work, exercise or other activity that involved that part of the body?\"       *No Answer*  7. AGGRAVATING FACTORS: \"What makes the knee pain " "worse?\" (e.g., walking, climbing stairs, running)      *No Answer*  8. ASSOCIATED SYMPTOMS: \"Is there any swelling or redness of the knee?\"      no  9. OTHER SYMPTOMS: \"Do you have any other symptoms?\" (e.g., chest pain, difficulty breathing, fever, calf pain)      no  10. PREGNANCY: \"Is there any chance you are pregnant?\" \"When was your last menstrual period?\"        *No Answer*    Protocols used: Ankle Pain-A-OH, Knee Pain-A-OH      Tiffany Godinez RN  [facility]    "

## 2023-08-31 ENCOUNTER — TELEPHONE (OUTPATIENT)
Dept: FAMILY MEDICINE | Facility: CLINIC | Age: 68
End: 2023-08-31
Payer: COMMERCIAL

## 2023-08-31 NOTE — TELEPHONE ENCOUNTER
[facility] Call Center    Phone Message    May a detailed message be left on voicemail: yes     Reason for Call: Other: Patient is wondering if her surgery can be moved up if possible. Was seen in ED yesterday and they mentioned that arthritis is getting worse and deteriorated more.      Action Taken: Message routed to:  Adult Clinics: Orthopedics p [phone]    Travel Screening: Not Applicable

## 2023-08-31 NOTE — TELEPHONE ENCOUNTER
She just picked up oxycodone 90 tablets on 8/18. She is way too early for refill.   Please schedule virtual visit to discuss medication refill. Can double book at tomorrow 3:30 for virtual visit.    cirrhosis  - Has been on dialysis with a right TDC  - Making some urine but not showing signs of recovery      Liver disease:  Chronic alcohol abuse, history of hep C  - prior acute alcoholic hepatitis      Respiratory Failure:  MRSA pneumonia and pulmonary edema. Resolved     Anemia of chronic disease:  Hb below target     Hypotension:  Part of cirrhotic physiology. On midodrine     Plan/     Potassium replacement   AMINATA for anemia  Follow labs   Monitor for recovery   Reduce gabapentin due to tremor and TRICIA, might need to hold.   Monitor   -----------------------------  Lang Hallman M.D.   [facility]

## 2023-09-01 ENCOUNTER — THERAPY VISIT (OUTPATIENT)
Dept: PHYSICAL THERAPY | Facility: CLINIC | Age: 68
End: 2023-09-01
Attending: ORTHOPAEDIC SURGERY
Payer: COMMERCIAL

## 2023-09-01 DIAGNOSIS — M17.11 PRIMARY LOCALIZED OSTEOARTHRITIS OF RIGHT KNEE: ICD-10-CM

## 2023-09-01 PROCEDURE — 97010 HOT OR COLD PACKS THERAPY: CPT | Mod: GP | Performed by: PHYSICAL THERAPIST

## 2023-09-01 PROCEDURE — 97161 PT EVAL LOW COMPLEX 20 MIN: CPT | Mod: GP | Performed by: PHYSICAL THERAPIST

## 2023-09-01 PROCEDURE — 97110 THERAPEUTIC EXERCISES: CPT | Mod: GP | Performed by: PHYSICAL THERAPIST

## 2023-09-01 ASSESSMENT — ACTIVITIES OF DAILY LIVING (ADL)
KNEE_ACTIVITY_OF_DAILY_LIVING_SCORE: 5.71
HOW_WOULD_YOU_RATE_THE_CURRENT_FUNCTION_OF_YOUR_KNEE_DURING_YOUR_USUAL_DAILY_ACTIVITIES_ON_A_SCALE_FROM_0_TO_100_WITH_100_BEING_YOUR_LEVEL_OF_KNEE_FUNCTION_PRIOR_TO_YOUR_INJURY_AND_0_BEING_THE_INABILITY_TO_PERFORM_ANY_OF_YOUR_USUAL_DAILY_ACTIVITIES?: 0
GIVING WAY, BUCKLING OR SHIFTING OF KNEE: THE SYMPTOM PREVENTS ME FROM ALL DAILY ACTIVITIES
KNEEL ON THE FRONT OF YOUR KNEE: I AM UNABLE TO DO THE ACTIVITY
HOW_WOULD_YOU_RATE_THE_OVERALL_FUNCTION_OF_YOUR_KNEE_DURING_YOUR_USUAL_DAILY_ACTIVITIES?: SEVERELY ABNORMAL
WEAKNESS: THE SYMPTOM AFFECTS MY ACTIVITY SEVERELY
SIT WITH YOUR KNEE BENT: I AM UNABLE TO DO THE ACTIVITY
SQUAT: I AM UNABLE TO DO THE ACTIVITY
RAW_SCORE: 4
SWELLING: THE SYMPTOM PREVENTS ME FROM ALL DAILY ACTIVITIES
PAIN: THE SYMPTOM PREVENTS ME FROM ALL DAILY ACTIVITIES
KNEE_ACTIVITY_OF_DAILY_LIVING_SUM: 4
STIFFNESS: THE SYMPTOM PREVENTS ME FROM ALL DAILY ACTIVITIES
AS_A_RESULT_OF_YOUR_KNEE_INJURY,_HOW_WOULD_YOU_RATE_YOUR_CURRENT_LEVEL_OF_DAILY_ACTIVITY?: SEVERELY ABNORMAL
WALK: ACTIVITY IS VERY DIFFICULT
GO DOWN STAIRS: I AM UNABLE TO DO THE ACTIVITY
GO UP STAIRS: I AM UNABLE TO DO THE ACTIVITY
LIMPING: THE SYMPTOM AFFECTS MY ACTIVITY SEVERELY
RISE FROM A CHAIR: ACTIVITY IS VERY DIFFICULT
STAND: I AM UNABLE TO DO THE ACTIVITY

## 2023-09-01 NOTE — PROGRESS NOTES
Medication Therapy Management (MTM) Encounter    ASSESSMENT:                            Medication Adherence/Access: See below for considerations    Depression/OCD/Schizoaffective disorder: Stable    Osteoarthritis/chronic pain: pain is not well controlled. May benefit from tylenol arthritis. Has appointment with Dr. King tomorrow to discuss plan for oxycodone.     Anemia: planned iron infusion for 9/8  PLAN:                          Could consider tylenol arthritis in place of extra strength tylenol    Follow-up: 1 month    SUBJECTIVE/OBJECTIVE:                          Holly Muir is a 68 year old female called for a follow-up visit.  Today's visit is a follow-up MTM visit from 8/9/2023.     Reason for visit:  Med review follow up    Allergies/ADRs: Reviewed in chart  Past Medical History: Reviewed in chart  Tobacco: She reports that she quit smoking about 47 years ago. Her smoking use included cigarettes. She started smoking about 48 years ago. She has never used smokeless tobacco.  Alcohol: not currently using  Lives between two daughters.     Medication Adherence/Access: daughters help manage medications. Uses a pill box. Patient is not very familiar with her medications.  Per patient, daughters hand her her medications to her.  Has timer on her phone to remind her when to take her insulin.  The patient fills medications at [facility]: NO, fills medications at [facility]    Depression/OCD/Schizoaffective disorder:  Abilify 20mg daily  Trazodone 75mg at bedtime  Paroxetine 40mg daily  Buproprion 200mg daily  Buspirone 30mg twice dailiy    Transitioning to a new therapist  but appointment is not until November. Patient continues to see psychiatrist at [facility]  Patient is falling asleep ok but is waking up with knee pain  Denies auditory and visual hallucinations since restarting medications. Anxiety is a little worse.  Feels the increase in abilify has been helpful.     Osteoarthritis/chronic pain:     Oxycodone 5 mg every three times daily as needed-taking three times daily-most severe in the morning and at night. Helps a little.  Acetaminophen 1000 mg every 8 hours (max 4g per day)  Diclofenac gel-using about 5 times a day-helps with the pain  Patient reports she is running out of oxycodone. She has 10 tablets left.  She had #90 filled 8/18. Patient's daughter reports that sometimes she will take 2 tablets instead of 1. Patient does not have access to the pills. Report no one else in the home takes this medication.     Is planning on having knee surgery 10/2. Patient is on a priority waiting list.    Anemia:   Ferrous gluconate 324 mg once daily plus vitamin C daily. Will be receiving IV iron. Infusion scheduled for 9/8.  No concerns today  Ferritin   Date Value Ref Range Status   08/01/2023 56 11 - 328 ng/mL Final   10/14/2020 40 8 - 252 ng/mL Final     Iron   Date Value Ref Range Status   08/01/2023 35 (L) 37 - 145 ug/dL Final   10/14/2020 43 35 - 180 ug/dL Final     Iron Binding Cap   Date Value Ref Range Status   10/14/2020 434 (H) 240 - 430 ug/dL Final     Iron Binding Capacity   Date Value Ref Range Status   08/01/2023 327 240 - 430 ug/dL Final       Today's Vitals: There were no vitals taken for this visit.  ----------------  I spent 24 minutes with this patient today. All changes were made via collaborative practice agreement with Dr. King. A copy of the visit note was provided to the patient's provider(s).    A summary of these recommendations was sent via [employer].    Telemedicine Visit Details  Type of service:  Telephone visit  Start Time: 8:31AM  End Time: 8:55AM     Medication Therapy Recommendations  Bilateral low back pain without sciatica    Current Medication: ACETAMINOPHEN EXTRA STRENGTH 500 MG tablet   Rationale: More effective medication available - Ineffective medication - Effectiveness   Recommendation: Change Medication Formulation    Status: Patient Agreed - Adherence/Education

## 2023-09-01 NOTE — TELEPHONE ENCOUNTER
Spoke with the patient and let her know that we currently don't have any sooner openings, but that I do have her on our move up list. I will call her if we get any openings.     Zenaida Umanzor  Surgery Scheduling Coordinator  Ph: [phone]

## 2023-09-01 NOTE — PROGRESS NOTES
PHYSICAL THERAPY EVALUATION  Type of Visit: Evaluation    See electronic medical record for Abuse and Falls Screening details.    Subjective       Presenting condition or subjective complaint: therapyPt reports that she's been struggling with R knee pain since November 2022 due to advanced arthritis. She is scheduled for a total replacement on 10/2/23. Reports that she had to go to ER two days ago due to the pain, was given immobilizer which she is wearing today.   Date of onset: 11/01/22    Relevant medical history: Arthritis; Cold or hot arm or leg; Depression; Diabetes; Dizziness; DVT (blood clot); Hearing problems; High blood pressure; Mental Illness; Overweight; Pain at night or rest; Significant weakness   Dates & types of surgery: L knee 1980    Prior diagnostic imaging/testing results: CT scan; X-ray     Prior therapy history for the same diagnosis, illness or injury: No      Prior Level of Function  Transfers:   Ambulation:   ADL:   IADL:     Living Environment  Social support: With family members   Type of home: House   Stairs to enter the home: Yes 7 Is there a railing: Yes   Ramp: No   Stairs inside the home: Yes 1 Is there a railing: No   Help at home: Home management tasks (cooking, cleaning)  Equipment owned: Straight Cane; Walker with wheels; Standard wheelchair; Bath bench; Dressing equipment     Employment: No    Hobbies/Interests: анна    Patient goals for therapy: walk without a walker    Pain assessment: See objective evaluation for additional pain details     Objective   KNEE EVALUATION  PAIN: Pain Level with Use: 10/10  Pain Location: R anterior knee, medial  Pain Quality: Sharp  Pain Frequency: constant  Pain is Worst: not time of day related  Pain is Exacerbated By: activity, walking  Pain is Relieved By: none  Pain Progression: Worsened  INTEGUMENTARY (edema, incisions):   POSTURE:   GAIT:  Weightbearing Status:   Assistive Device(s): Brace, Walker (front wheeled)  Gait Deviations:    BALANCE/PROPRIOCEPTION:   WEIGHTBEARING ALIGNMENT:   NON-WEIGHTBEARING ALIGNMENT:  varus deformity in longsitting, pt unable to stand with immobilizer and hand support form walker  ROM:   (Degrees) Left AROM Left PROM  Right AROM Right PROM   Knee Flexion 107  35    Knee Extension 3 hyper  -10    Pain:   End feel:     STRENGTH:  poor quad recruitment with quad set, did not formally assess strength due to pain levels  FLEXIBILITY:   SPECIAL TESTS:   FUNCTIONAL TESTS:   PALPATION:   + Tenderness At Location Left Right   Medial Joint Line  +   Lateral Joint Line     Patellar Tendon  +   IT Band     Incisional     Popliteal     Biceps Femoris     Semitendinosis     Semimembranosis     Glut Medius     Patellar Medial  +   Patellar Lateral  +   Patellar Superior  +   Patellar Inferior   +     JOINT MOBILITY:    Left Right   Tib-Fib Proximal     Patellofemoral Medial  Hypomobile   Patellofemoral Lateral  Hypomobile   Patellofemoral Superior  Hypomobile   Patellofemoral Inferior  Hypomobile     Hypomobile       Assessment & Plan   CLINICAL IMPRESSIONS  Medical Diagnosis: Primary localized osteoarthritis of right knee    Treatment Diagnosis: R knee OA   Impression/Assessment: Patient is a 68 year old female with R knee complaints.  The following significant findings have been identified: Pain, Decreased ROM/flexibility, Decreased joint mobility, Decreased strength, Inflammation, Edema, and Impaired gait. These impairments interfere with their ability to perform self care tasks, household chores, household mobility, and community mobility as compared to previous level of function.     Clinical Decision Making (Complexity):  Clinical Presentation: Evolving/Changing  Clinical Presentation Rationale: based on medical and personal factors listed in PT evaluation  Clinical Decision Making (Complexity): Low complexity    PLAN OF CARE  Treatment Interventions:  Modalities: Cryotherapy  Interventions: Gait Training, Manual Therapy,  Neuromuscular Re-education, Therapeutic Activity, Therapeutic Exercise    Long Term Goals     PT Goal 1  Goal Identifier: sleep  Goal Description: Pt will be able to sleep through the night without pain  Rationale: to maximize safety and independence with performance of ADLs and functional tasks;to maximize safety and independence with self cares  Goal Progress: 2-3 hours of sleep as of eval  Target Date: 10/27/23      Frequency of Treatment: 1x/wk  Duration of Treatment: 8 wks    Recommended Referrals to Other Professionals:   Education Assessment:        Risks and benefits of evaluation/treatment have been explained.   Patient/Family/caregiver agrees with Plan of Care.     Evaluation Time:     PT Lorie, Low Complexity Minutes (69257): 20       Signing Clinician: Dwayne Nuno PT      [facility]                                                                                   OUTPATIENT PHYSICAL THERAPY      PLAN OF TREATMENT FOR OUTPATIENT REHABILITATION   Patient's Last Name, First Name, Holly Rahman YOB: 1955   Provider's Name   [facility]   Medical Record No.  [id number]     Onset Date: 11/01/22  Start of Care Date: 09/01/23     Medical Diagnosis:  Primary localized osteoarthritis of right knee      PT Treatment Diagnosis:  R knee OA Plan of Treatment  Frequency/Duration: 1x/wk/ 8 wks    Certification date from 09/01/23 to 10/27/23         See note for plan of treatment details and functional goals     Dwayne Nuno, PT                         I CERTIFY THE NEED FOR THESE SERVICES FURNISHED UNDER        THIS PLAN OF TREATMENT AND WHILE UNDER MY CARE     (Physician attestation of this document indicates review and certification of the therapy plan).                Referring Provider:  Sanford Sheehan      Initial Assessment  See Epic Evaluation- Start of Care Date: 09/01/23

## 2023-09-02 DIAGNOSIS — G25.81 RESTLESS LEG SYNDROME: ICD-10-CM

## 2023-09-05 RX ORDER — ROPINIROLE 0.25 MG/1
0.25 TABLET, FILM COATED ORAL EVERY EVENING
Qty: 90 TABLET | Refills: 0 | OUTPATIENT
Start: 2023-09-05

## 2023-09-06 ENCOUNTER — VIRTUAL VISIT (OUTPATIENT)
Dept: PHARMACY | Facility: CLINIC | Age: 68
End: 2023-09-06
Payer: COMMERCIAL

## 2023-09-06 ENCOUNTER — OFFICE VISIT (OUTPATIENT)
Dept: PODIATRY | Facility: CLINIC | Age: 68
End: 2023-09-06
Payer: COMMERCIAL

## 2023-09-06 ENCOUNTER — TELEPHONE (OUTPATIENT)
Dept: FAMILY MEDICINE | Facility: CLINIC | Age: 68
End: 2023-09-06

## 2023-09-06 VITALS — SYSTOLIC BLOOD PRESSURE: 116 MMHG | HEART RATE: 70 BPM | DIASTOLIC BLOOD PRESSURE: 65 MMHG

## 2023-09-06 DIAGNOSIS — D63.1 ANEMIA DUE TO STAGE 3A CHRONIC KIDNEY DISEASE (H): Primary | ICD-10-CM

## 2023-09-06 DIAGNOSIS — M54.50 CHRONIC BILATERAL LOW BACK PAIN WITHOUT SCIATICA: ICD-10-CM

## 2023-09-06 DIAGNOSIS — F42.9 OBSESSIVE-COMPULSIVE DISORDER, UNSPECIFIED TYPE: ICD-10-CM

## 2023-09-06 DIAGNOSIS — G89.29 CHRONIC BILATERAL LOW BACK PAIN WITHOUT SCIATICA: ICD-10-CM

## 2023-09-06 DIAGNOSIS — M17.11 PRIMARY OSTEOARTHRITIS OF RIGHT KNEE: ICD-10-CM

## 2023-09-06 DIAGNOSIS — N18.31 ANEMIA DUE TO STAGE 3A CHRONIC KIDNEY DISEASE (H): Primary | ICD-10-CM

## 2023-09-06 DIAGNOSIS — S90.221A SUBUNGUAL HEMATOMA OF TOE OF RIGHT FOOT, INITIAL ENCOUNTER: Primary | ICD-10-CM

## 2023-09-06 DIAGNOSIS — F32.3 SEVERE MAJOR DEPRESSION WITH PSYCHOTIC FEATURES (H): ICD-10-CM

## 2023-09-06 DIAGNOSIS — E11.42 DIABETIC POLYNEUROPATHY ASSOCIATED WITH TYPE 2 DIABETES MELLITUS (H): ICD-10-CM

## 2023-09-06 DIAGNOSIS — F25.9 SCHIZOAFFECTIVE DISORDER, UNSPECIFIED TYPE (H): ICD-10-CM

## 2023-09-06 PROCEDURE — 99203 OFFICE O/P NEW LOW 30 MIN: CPT | Performed by: PODIATRIST

## 2023-09-06 PROCEDURE — 99606 MTMS BY PHARM EST 15 MIN: CPT | Performed by: PHARMACIST

## 2023-09-06 PROCEDURE — 99607 MTMS BY PHARM ADDL 15 MIN: CPT | Performed by: PHARMACIST

## 2023-09-06 NOTE — PROGRESS NOTES
Patient complains of discolored nail on right 2nd toe.  Has had this for 2 months.  No pain.  No history of trauma.   Does not like the look of the nail and is wondering about options.  Denies drainage.  Denies erythema ecchymosis or increased deformity.  She is having an upcoming knee surgery in a few weeks.  Patient is anticoagulated.  She has diabetes.    ROS:  see above       Allergies   Allergen Reactions    Morphine Sulfate Itching       Current Outpatient Medications   Medication Sig Dispense Refill    ACETAMINOPHEN EXTRA STRENGTH 500 MG tablet TAKE 2 TABLETS BY MOUTH EVERY 8 HOURS. MAX ACETAMINOPHEN DOSE: 4000MG IN 24 HRS. 180 tablet 1    apixaban ANTICOAGULANT (ELIQUIS) 5 MG tablet Take 1 tablet (5 mg) by mouth 2 times daily 60 tablet 2    ARIPiprazole (ABILIFY) 15 MG tablet Take 15 mg by mouth At Bedtime      blood glucose (NO BRAND SPECIFIED) test strip Use to test blood sugar 4 times daily or as directed. accu check guide strips 360 strip 3    blood glucose monitoring (NO BRAND SPECIFIED) meter device kit Use to test blood sugar 4  times daily or as directed. Accuceck Guide meter 1 kit 1    blood glucose monitoring (ONE TOUCH ULTRA 2) meter device kit USE TO TEST BLOOD SUGAR 4 TIMES DAILY OR AS DIRECTED. ACCUCECK GUIDE METER      blood glucose monitoring (ULTRA THIN 30G) lancets Use to test blood sugar 4  times daily or as directed.lancets for acchu check guide 400 each 3    buPROPion (WELLBUTRIN SR) 200 MG 12 hr tablet Take 1 tablet by mouth daily at 2 pm      busPIRone HCl (BUSPAR) 30 MG tablet TAKE 1 TABLET BY MOUTH TWICE A DAY 60 tablet 5    cetirizine (ZYRTEC) 10 MG tablet Take 1 tablet (10 mg) by mouth daily 90 tablet 1    Continuous Blood Gluc  (FREESTYLE CM 2 READER) GALINA 1 Dose continuous 1 each 0    Continuous Blood Gluc Sensor (FREESTYLE CM 2 SENSOR) MISC 1 Dose continuous 9 each 3    [employer] ASPIRIN ADULT LOW DOSE 81 MG chewable tablet TAKE 1 TABLET BY MOUTH EVERY DAY 90 tablet 0     diclofenac (VOLTAREN) 1 % topical gel Apply 4 g topically 4 times daily 350 g 3    ferrous gluconate (FERGON) 324 (38 Fe) MG tablet TAKE 1 TABLET (324 MG) BY MOUTH DAILY (WITH BREAKFAST) 90 tablet 3    gabapentin (NEURONTIN) 600 MG tablet TAKE 2 TABLETS (1,200 MG) BY MOUTH 3 TIMES DAILY FOR 30 DAYS 180 tablet 5    glimepiride (AMARYL) 2 MG tablet Take 2 tablets (4 mg) by mouth 2 times daily (before meals) 360 tablet 3    insulin aspart (NOVOLOG FLEXPEN) 100 UNIT/ML pen Using as needed for high blood sugars      insulin glargine (LANTUS SOLOSTAR) 100 UNIT/ML pen Inject 35-42 units at bedtime as directed + 2 units for priming of pen. Max dose 44 units. 45 mL 1    insulin pen needle (31G X 5 MM) 31G X 5 MM miscellaneous Use four times 4 day times a day 200 each 3    metFORMIN (GLUCOPHAGE XR) 500 MG 24 hr tablet Take two tablets with breakfast and one tablet with dinner for one week then increase to two tablets with breakfast and two tablets with dinner thereafter 360 tablet 1    Multiple Vitamin (DAILY-ISAAC MULTIVITAMIN) TABS TAKE 1 TABLET BY MOUTH EVERY DAY 90 tablet 0    naloxone (NARCAN) 4 MG/0.1ML nasal spray Spray 1 spray (4 mg) into one nostril alternating nostrils once as needed for opioid reversal every 2-3 minutes until assistance arrives 0.2 mL 0    nystatin (MYCOSTATIN) 371516 UNIT/GM external powder Apply 1 dose topically 3 times daily as needed 60 g 3    omeprazole (PRILOSEC) 20 MG DR capsule TAKE 2 CAPSULES (40 MG) BY MOUTH DAILY *TAKE 30-60 MINUTES BEFORE A MEAL* 180 capsule 2    oxyCODONE (ROXICODONE) 5 MG tablet Take 1 tablet (5 mg) by mouth 3 times daily as needed for pain 90 tablet 0    PARoxetine (PAXIL) 40 MG tablet Take 1 tablet by mouth daily at 2 pm      pioglitazone (ACTOS) 45 MG tablet Take 1 tablet (45 mg) by mouth daily 90 tablet 1    propranolol ER (INDERAL LA) 80 MG 24 hr capsule TAKE 1 CAPSULE BY MOUTH EVERY DAY 90 capsule 3    ramipril (ALTACE) 5 MG capsule TAKE 1 CAPSULE BY MOUTH EVERY  DAY 90 capsule 2    rOPINIRole (REQUIP) 0.25 MG tablet TAKE 1 TABLET (0.25 MG) BY MOUTH EVERY EVENING 90 tablet 0    simvastatin (ZOCOR) 20 MG tablet TAKE 1 TABLET BY MOUTH EVERYDAY AT BEDTIME 90 tablet 2    tiZANidine (ZANAFLEX) 2 MG tablet TAKE 1 TABLET BY MOUTH THREE TIMES A DAY AS NEEDED FOR MUSCLE SPASM 270 tablet 0    traZODone (DESYREL) 50 MG tablet Take 75 mg by mouth At Bedtime      vitamin C (CVS VITAMIN C) 500 MG tablet 1/2 TABLET BY MOUTH JACOBSON WITH IRON SUPPLEMENT BEFORE MEAL 45 tablet 3    VITAMIN D3 25 MCG (1000 UT) tablet TAKE 1 TABLET BY MOUTH EVERY DAY 90 tablet 3       Patient Active Problem List   Diagnosis    Hyperlipidemia LDL goal <100    Severe major depression with psychotic features (H)    History of cervical cancer    S/P colostomy (H)    Mixed incontinence urge and stress    Memory loss    Pain in joint, hand    Trigger finger, acquired    Synovitis and tenosynovitis    Dermatochalasis    Presbyopia    OCD (obsessive compulsive disorder)    HTN, goal below 140/90    Cataracts, both eyes    Primary localized osteoarthritis of right knee    Health Care Home    Primary generalized (osteo)arthritis    Rotator cuff impingement syndrome    Hypoglycemia due to insulin    Encounter for long-term (current) use of insulin (H)    Colostomy, evaluate (H)    Urinary retention    ACP (advance care planning)    Bilateral low back pain without sciatica    Anxiety    Fecal incontinence due to anorectal disorder    Type 2 diabetes mellitus with hyperglycemia, with long-term current use of insulin (H)    Visual hallucination    Obesity (BMI 35.0-39.9) with comorbidity (H)    Anal stenosis    S/P partial colectomy    Anemia    Chest pain, musculoskeletal    Diabetic neuropathy (H)    GERD (gastroesophageal reflux disease)    Hyponatremia    Schizoaffective disorder (H)    Pseudophakia of right eye    Fecal urgency    Unspecified inflammatory spondylopathy, lumbar region (H)    Restless leg syndrome    Tremor     Chronic kidney disease, stage 3a (H)    Chronic right shoulder pain    Other acute pulmonary embolism without acute cor pulmonale (H)    Pneumonia    Iron deficiency anemia secondary to inadequate dietary iron intake       Past Medical History:   Diagnosis Date    Bilateral knee pain     Cataracts, both eyes 5/8/2013    Cervical cancer (H)     Chronic kidney disease, stage 3a (H) 11/17/2021    Diabetes     Diabetic retinopathy (H)     HTN     Inflammatory arthritis     Major depression     Memory loss     Nephropathy     OA (osteoarthritis) of knee 9/11/2013    Other and unspecified hyperlipidemia     Pterygium eye     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       Past Surgical History:   Procedure Laterality Date    ARTHROSCOPY KNEE RT/LT      LT    BLEPHAROPLASTY BILATERAL Bilateral 8/16/2019    Procedure: BILATERAL UPPER LID BLEPHAROPLASTY;  Surgeon: Randolph Cook MD;  Location: [facility] OR    BREAST LUMPECTOMY, RT/LT      LT-benign     CATARACT IOL, RT/LT      COLONOSCOPY  5/10/2012    Procedure:COLONOSCOPY; screening colonoscopy; Surgeon:MILLA VEGA; Location:[facility] OR    COLONOSCOPY WITH CO2 INSUFFLATION N/A 8/2/2019    Procedure: Colonoscopy with CO2 insufflation;  Surgeon: Himanshu Hi MD;  Location: [facility] OR    COLOSTOMY  2000    COLOSTOMY      HYSTERECTOMY, PAP NO LONGER INDICATED      done in [place]-cervical cancer    IMPLANT STIMULATOR SACRAL NERVE STAGE ONE Right 3/10/2015    Procedure: IMPLANT STIMULATOR SACRAL NERVE STAGE ONE;  Surgeon: Teodora Yusuf MD;  Location: [facility]    IMPLANT STIMULATOR SACRAL NERVE STAGE TWO Right 3/31/2015    Procedure: IMPLANT STIMULATOR SACRAL NERVE STAGE TWO;  Surgeon: Teodora Yusuf MD;  Location: [facility]    IMPLANT STIMULATOR SACRAL NERVE STAGE TWO Right 6/22/2020    Procedure: INSERTION, SACRAL NERVE STIMULATOR, STAGE 2 (replacement of interstim battery);  Surgeon: Teodora Yusuf MD;  Location: [facility] OR    INJECT EPIDURAL TRANSFORAMINAL  Right 2023    Procedure: Right Lumbar 5-Sacral 1 Transforaminal Epidural Steroid Injection with fluoroscopic guidance and contrast;  Surgeon: Kulwinder Booth MD;  Location: [facility] OR    INJECT EPIDURAL TRANSFORAMINAL Right 3/23/2023    Procedure: Right Lumbar 5-Sacral 1 Transforaminal Epidural Steroid Injection with fluoroscopic guidance and contrast.;  Surgeon: Kulwinder Booth MD;  Location: [facility] OR    PHACOEMULSIFICATION WITH STANDARD INTRAOCULAR LENS IMPLANT Left 2019    Procedure: LEFT PHACOEMULSIFICATION, CATARACT, WITH STANDARD IOL INSERTION;  Surgeon: Francesco Winn MD;  Location: [facility]    PHACOEMULSIFICATION WITH STANDARD INTRAOCULAR LENS IMPLANT Right 10/24/2019    Procedure: RIGHT PHACOEMULSIFICATION, CATARACT, WITH STANDARD IOL INSERTION;  Surgeon: Francesco Winn MD;  Location: [facility]    REPAIR PTOSIS Bilateral 2019    SALPINGO OOPHORECTOMY,R/L/JENNIFER      Salpingo Oophorectomy, RT/LT/JENNIFER    ZZC EXCIS PTERYGIUM  10/08    [facility]    ZZC STOMACH SURGERY PROCEDURE UNLISTED      ZZHC COLONOSCOPY THRU STOMA, DIAGNOSTIC      normal per report-no records available-records destroyed       Family History   Problem Relation Age of Onset    Diabetes Mother     Cerebrovascular Disease Mother     Diabetes Father     Hypertension Father     Cancer Maternal Grandfather     Cancer Paternal Grandfather     Diabetes Brother     Diabetes Sister     Thyroid Disease Daughter     Thyroid Disease Sister     Multiple Sclerosis Daughter     Glaucoma No family hx of     Macular Degeneration No family hx of        Social History     Tobacco Use    Smoking status: Former     Packs/day: 0.00     Types: Cigarettes     Start date: 1974     Quit date: 1975     Years since quittin.1     Passive exposure: Never    Smokeless tobacco: Never   Substance Use Topics    Alcohol use: Yes     Alcohol/week: 0.0 standard drinks of alcohol     Comment: social occasions         Exam:    Vitals: BP  116/65   Pulse 70   BMI: There is no height or weight on file to calculate BMI.  Height: Data Unavailable    Constitutional/ general:  Pt is in no apparent distress, appears well-nourished.  Cooperative with history and physical exam.     Psych:  The patient answered questions appropriately.  Normal affect.  Seems to have reasonable expectations, in terms of treatment.     Lungs:  Non labored breathing, non labored speech. No cough.  No audible wheezing. Even, quiet breathing.       Vascular: Pedal pulses palpable.  Ankle edema noted.    Neuro:  Alert and oriented x 3. Coordinated gait.  Light touch sensation is intact     Derm: Normal texture and turgor.  No erythema, ecchymosis, or cyanosis.      Musculoskeletal:    Lower extremity muscle strength is normal.  Patient is ambulatory with right knee brace.  Long right second toe.  The nail on the second toe is long.  We trimmed this long term back.  Dried blood noted under the medial border.  There is no erythema edema or drainage.      Hemoglobin A1C0.0 - 5.6 %7.9 High 8.7 High  CM9.1 High 8.5 High  CM10.1 High  CM8.9 Abnormal  R10.5 High  CMComment: Normal <5.7%           A/P    Diabetes mellitus poorly controlled   subungual hematoma    We trimmed back the right second nail.  The underlying nailbed is healthy.  We did note a small amount of dried blood underneath the nail.  Remaining nails intact.  Discussed the mechanics of her toe causing repetitive trauma which can lead to bleeding under the nail.  Make sure shoes do not hit nail.  We will keep nails shorter.  Discussed to ensure no skin lesion recommend biopsy of nail bed.  She is having upcoming knee surgery and does not want anything to jeopardize this.  Once she has recovered from this will return to clinic for biopsy.    Salvador Billingsley, MARYBETH, FACFAS

## 2023-09-06 NOTE — PATIENT INSTRUCTIONS
We wish you continued good healing. If you have any questions or concerns, please do not hesitate to contact us at  [phone]    vogogot (secure e-mail communication and access to your chart) to send a message or to make an appointment.    Please remember to call and schedule a follow up appointment if one was recommended at your earliest convenience.     PODIATRY CLINIC HOURS  TELEPHONE NUMBER    Dr. Salvador ANDERSONPSTEPHANIE [facility]        Clinics:  Antonio Wooten  [facility], JAMES Larios, PATRICK Munoz  Tuesday 1PM-6PM  [place]  Wednesday 745AM-330PM  [place]  Thursday/Friday 745AM-230PM  [place]   1st and 3rd Mondays  845-430 PM   JE/ANTONIO APPOINTMENTS  [phone]    [place] APPOINTMENTS  [phone]    [place] APPOINTMENTS  [phone]        If you need a medication refill, please contact us you may need lab work and/or a follow up visit prior to your refill (i.e. Antifungal medications).  If MRI needed please call Imaging at [phone]   HOW DO I GET MY KNEE SCOOTER? Knee scooters can be picked up at ANY Medical Supply stores with your knee scooter Prescription.  OR  Bring your signed prescription to an [facility] showroom.  Call or bring in your Orthotics order to any Orthotics locations. Or call [phone]         [facility](out of pocket)........................[phone]    They travel to the following community/[facility].   Need to call [facility] to set up appointments.     [facility] (in Home)    Mera Broderick [phone]    [facility]    Malathi Oconnell RN, BSN, [phone]    [facility]....[phone]    [facility]:    Pedro Gonzalez.......[phone]    [place]................[phone]    [place]-.....[phone]    [place]........................[phone]    [facility].................[phone]      ** YOU MUST  "CALL FOR INFORMATION ON DAYS, TIMES AND COST OF SERVICE**      For up to date list of Foot Care Rn's. Visit the website    American [employer] Nurses Association website    Afcna.org    Click on the \"Find a foot care provider\" Link      Rose RodneyRN,[facility] [phone] (Text or call) Has limited home visit.    Blanca Ramos RN,[facility] [phone]    Cait [phone]    Felicia Johnson,EVELYN,BSN,[facility] [phone]    Magaly Martinez [phone]    Lizzy Ralph [phone]    Helene Palm [phone]    Carey [phone]    **THIS IS A PRIVATE PAY SERVICE- NOT BILLABLE TO MEDICARE OR INSURANCE** Routine Foot Care Referral List  [facility]  [phone]  [facility]  [phone]  [facility]  [facility]iatrist   Myke Mcpherson DPM  Location:  [address], [address], [place] [address]  Phone: [phone]    [facility]  Podiatrist  Dwayne Michael DPM  Locations:  [place]  [address], [address], [place] [address]- [phone]  [place]  [address] [place], [address], [place] [address]- [phone]  [place]  [address], [address], [place] [address]- [phone]    Foot & Ankle Physician [facility]  Podiatrist  Ramon Forte DPM  Locations:  [place] [id number], [address], [place] [address]- [phone]  [place]  [address] [address]- [phone]      [facility]  Podiatrists  MARYBETH Garcia DPM  Location  [place] [address], [address], [place] [address]- [phone]    [facility]  Podiatrist  Rex Hall, MARYBETH  Location  [address] [address]-  [phone]    Myke Reid DPM  Podiatrist   Myke Reid DPM  Location  [address] [address]-  [phone]  [place]  [address] [place], [place] [address], [place] [address]-  [phone]    [facility]" [facility]  Podiatrists  Reji Wolfe, DPQUINN Mixon, DPQUINN Seo, DPM  Locations  [place]  [address], [address], [place] [address]- [phone]  [place]  [address], [address], [place] [address]-  [phone]    [facility]  Podiatrists  Edil Thomas, DPQUINN Paige, DPQUINN Bruns, DPQUINN Elias, DPQUINN Ward, DPQUINN Loya, DPQUINN Muhammad, DPQUINN Mixon, DPM  Alvaro Chairez, DPM  Locations  [place]  [address], [address], [place], [place] [address]- [phone]  [place]   [address],  [address], [place] [address]-  [phone]  [place]  [address], [address], [place], [place] [address]- [phone]  [place]  [address], [address], [place] [address]- [phone]  [place]  [address], [address], [place], [place] [address]- [phone]    [facility]  Podiatrists  Raheem Giraldo, DPQUINN Rahman, DPQUINN Wallace, DPQUINN Sparks, DPQUINN Guzmán, DPQUINN Ward, DPM  Locations  [place]  [address], [address], [place], [place] [address]- [phone]  [place]  [address], [address], [place], [place] [address]- [phone]  [place]  [address] , [place], [place] [address]- [phone]    [facility]  Podiatrist   Valentino Adams, DPM  Location   [place]  [address], [address], [place], [place] [address]- [phone]    [facility]   Podiatrists  Ashley Artis, DPM  Terrell Lorenzana DPM  Location  [place]  [address], [address], [place] [address]-  [phone]    [facility]  Podiatrist   Asher Cobb DPM  Location  [place]  [address], [address], [place] [address]-  [phone]      [facility]  Podiatrist   SOHAIL NinoM  Location  [address] [facility]  [place] [address]-  [phone]

## 2023-09-06 NOTE — LETTER
9/6/2023         RE: Holly Muir  [address] [address]        Dear Colleague,    Thank you for referring your patient, Holly Muir, to the [facility]. Please see a copy of my visit note below.    Patient complains of discolored nail on right 2nd toe.  Has had this for 2 months.  No pain.  No history of trauma.   Does not like the look of the nail and is wondering about options.  Denies drainage.  Denies erythema ecchymosis or increased deformity.  She is having an upcoming knee surgery in a few weeks.  Patient is anticoagulated.  She has diabetes.    ROS:  see above       Allergies   Allergen Reactions     Morphine Sulfate Itching       Current Outpatient Medications   Medication Sig Dispense Refill     ACETAMINOPHEN EXTRA STRENGTH 500 MG tablet TAKE 2 TABLETS BY MOUTH EVERY 8 HOURS. MAX ACETAMINOPHEN DOSE: 4000MG IN 24 HRS. 180 tablet 1     apixaban ANTICOAGULANT (ELIQUIS) 5 MG tablet Take 1 tablet (5 mg) by mouth 2 times daily 60 tablet 2     ARIPiprazole (ABILIFY) 15 MG tablet Take 15 mg by mouth At Bedtime       blood glucose (NO BRAND SPECIFIED) test strip Use to test blood sugar 4 times daily or as directed. accu check guide strips 360 strip 3     blood glucose monitoring (NO BRAND SPECIFIED) meter device kit Use to test blood sugar 4  times daily or as directed. Accuceck Guide meter 1 kit 1     blood glucose monitoring (ONE TOUCH ULTRA 2) meter device kit USE TO TEST BLOOD SUGAR 4 TIMES DAILY OR AS DIRECTED. ACCUCECK GUIDE METER       blood glucose monitoring (ULTRA THIN 30G) lancets Use to test blood sugar 4  times daily or as directed.lancets for acchu check guide 400 each 3     buPROPion (WELLBUTRIN SR) 200 MG 12 hr tablet Take 1 tablet by mouth daily at 2 pm       busPIRone HCl (BUSPAR) 30 MG tablet TAKE 1 TABLET BY MOUTH TWICE A DAY 60 tablet 5     cetirizine (ZYRTEC) 10 MG tablet Take 1 tablet (10 mg) by mouth daily 90 tablet 1      Continuous Blood Gluc  (FREESTYLE CM 2 READER) GALINA 1 Dose continuous 1 each 0     Continuous Blood Gluc Sensor (FREESTYLE CM 2 SENSOR) MISC 1 Dose continuous 9 each 3     CVS ASPIRIN ADULT LOW DOSE 81 MG chewable tablet TAKE 1 TABLET BY MOUTH EVERY DAY 90 tablet 0     diclofenac (VOLTAREN) 1 % topical gel Apply 4 g topically 4 times daily 350 g 3     ferrous gluconate (FERGON) 324 (38 Fe) MG tablet TAKE 1 TABLET (324 MG) BY MOUTH DAILY (WITH BREAKFAST) 90 tablet 3     gabapentin (NEURONTIN) 600 MG tablet TAKE 2 TABLETS (1,200 MG) BY MOUTH 3 TIMES DAILY FOR 30 DAYS 180 tablet 5     glimepiride (AMARYL) 2 MG tablet Take 2 tablets (4 mg) by mouth 2 times daily (before meals) 360 tablet 3     insulin aspart (NOVOLOG FLEXPEN) 100 UNIT/ML pen Using as needed for high blood sugars       insulin glargine (LANTUS SOLOSTAR) 100 UNIT/ML pen Inject 35-42 units at bedtime as directed + 2 units for priming of pen. Max dose 44 units. 45 mL 1     insulin pen needle (31G X 5 MM) 31G X 5 MM miscellaneous Use four times 4 day times a day 200 each 3     metFORMIN (GLUCOPHAGE XR) 500 MG 24 hr tablet Take two tablets with breakfast and one tablet with dinner for one week then increase to two tablets with breakfast and two tablets with dinner thereafter 360 tablet 1     Multiple Vitamin (DAILY-ISAAC MULTIVITAMIN) TABS TAKE 1 TABLET BY MOUTH EVERY DAY 90 tablet 0     naloxone (NARCAN) 4 MG/0.1ML nasal spray Spray 1 spray (4 mg) into one nostril alternating nostrils once as needed for opioid reversal every 2-3 minutes until assistance arrives 0.2 mL 0     nystatin (MYCOSTATIN) 185334 UNIT/GM external powder Apply 1 dose topically 3 times daily as needed 60 g 3     omeprazole (PRILOSEC) 20 MG DR capsule TAKE 2 CAPSULES (40 MG) BY MOUTH DAILY *TAKE 30-60 MINUTES BEFORE A MEAL* 180 capsule 2     oxyCODONE (ROXICODONE) 5 MG tablet Take 1 tablet (5 mg) by mouth 3 times daily as needed for pain 90 tablet 0     PARoxetine (PAXIL) 40  MG tablet Take 1 tablet by mouth daily at 2 pm       pioglitazone (ACTOS) 45 MG tablet Take 1 tablet (45 mg) by mouth daily 90 tablet 1     propranolol ER (INDERAL LA) 80 MG 24 hr capsule TAKE 1 CAPSULE BY MOUTH EVERY DAY 90 capsule 3     ramipril (ALTACE) 5 MG capsule TAKE 1 CAPSULE BY MOUTH EVERY DAY 90 capsule 2     rOPINIRole (REQUIP) 0.25 MG tablet TAKE 1 TABLET (0.25 MG) BY MOUTH EVERY EVENING 90 tablet 0     simvastatin (ZOCOR) 20 MG tablet TAKE 1 TABLET BY MOUTH EVERYDAY AT BEDTIME 90 tablet 2     tiZANidine (ZANAFLEX) 2 MG tablet TAKE 1 TABLET BY MOUTH THREE TIMES A DAY AS NEEDED FOR MUSCLE SPASM 270 tablet 0     traZODone (DESYREL) 50 MG tablet Take 75 mg by mouth At Bedtime       vitamin C (CVS VITAMIN C) 500 MG tablet 1/2 TABLET BY MOUTH JACOBSON WITH IRON SUPPLEMENT BEFORE MEAL 45 tablet 3     VITAMIN D3 25 MCG (1000 UT) tablet TAKE 1 TABLET BY MOUTH EVERY DAY 90 tablet 3       Patient Active Problem List   Diagnosis     Hyperlipidemia LDL goal <100     Severe major depression with psychotic features (H)     History of cervical cancer     S/P colostomy (H)     Mixed incontinence urge and stress     Memory loss     Pain in joint, hand     Trigger finger, acquired     Synovitis and tenosynovitis     Dermatochalasis     Presbyopia     OCD (obsessive compulsive disorder)     HTN, goal below 140/90     Cataracts, both eyes     Primary localized osteoarthritis of right knee     Health Care Home     Primary generalized (osteo)arthritis     Rotator cuff impingement syndrome     Hypoglycemia due to insulin     Encounter for long-term (current) use of insulin (H)     Colostomy, evaluate (H)     Urinary retention     ACP (advance care planning)     Bilateral low back pain without sciatica     Anxiety     Fecal incontinence due to anorectal disorder     Type 2 diabetes mellitus with hyperglycemia, with long-term current use of insulin (H)     Visual hallucination     Obesity (BMI 35.0-39.9) with comorbidity (H)      Anal stenosis     S/P partial colectomy     Anemia     Chest pain, musculoskeletal     Diabetic neuropathy (H)     GERD (gastroesophageal reflux disease)     Hyponatremia     Schizoaffective disorder (H)     Pseudophakia of right eye     Fecal urgency     Unspecified inflammatory spondylopathy, lumbar region (H)     Restless leg syndrome     Tremor     Chronic kidney disease, stage 3a (H)     Chronic right shoulder pain     Other acute pulmonary embolism without acute cor pulmonale (H)     Pneumonia     Iron deficiency anemia secondary to inadequate dietary iron intake       Past Medical History:   Diagnosis Date     Bilateral knee pain      Cataracts, both eyes 5/8/2013     Cervical cancer (H)      Chronic kidney disease, stage 3a (H) 11/17/2021     Diabetes      Diabetic retinopathy (H)      HTN      Inflammatory arthritis      Major depression      Memory loss      Nephropathy      OA (osteoarthritis) of knee 9/11/2013     Other and unspecified hyperlipidemia      Pterygium eye      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       Past Surgical History:   Procedure Laterality Date     ARTHROSCOPY KNEE RT/LT      LT     BLEPHAROPLASTY BILATERAL Bilateral 8/16/2019    Procedure: BILATERAL UPPER LID BLEPHAROPLASTY;  Surgeon: Randolph Cook MD;  Location:  OR     BREAST LUMPECTOMY, RT/LT      LT-benign      CATARACT IOL, RT/LT       COLONOSCOPY  5/10/2012    Procedure:COLONOSCOPY; screening colonoscopy; Surgeon:MILLA VEGA; [facility]     COLONOSCOPY WITH CO2 INSUFFLATION N/A 8/2/2019    Procedure: Colonoscopy with CO2 insufflation;  Surgeon: Himanshu Hi MD;  Location: [facility] OR     COLOSTOMY  2000     COLOSTOMY       HYSTERECTOMY, PAP NO LONGER INDICATED      done in [place]-cervical cancer     IMPLANT STIMULATOR SACRAL NERVE STAGE ONE Right 3/10/2015    Procedure: IMPLANT STIMULATOR SACRAL NERVE STAGE ONE;  Surgeon: Teodora Yusuf MD;  [facility]     IMPLANT STIMULATOR  SACRAL NERVE STAGE TWO Right 3/31/2015    Procedure: IMPLANT STIMULATOR SACRAL NERVE STAGE TWO;  Surgeon: Teodora Yusuf MD;  [facility] OR     IMPLANT STIMULATOR SACRAL NERVE STAGE TWO Right 6/22/2020    Procedure: INSERTION, SACRAL NERVE STIMULATOR, STAGE 2 (replacement of interstim battery);  Surgeon: Teodora Yusuf MD;  Location: [facility] OR     INJECT EPIDURAL TRANSFORAMINAL Right 1/20/2023    Procedure: Right Lumbar 5-Sacral 1 Transforaminal Epidural Steroid Injection with fluoroscopic guidance and contrast;  Surgeon: Kulwinder Booth MD;  Location: [facility] OR     INJECT EPIDURAL TRANSFORAMINAL Right 3/23/2023    Procedure: Right Lumbar 5-Sacral 1 Transforaminal Epidural Steroid Injection with fluoroscopic guidance and contrast.;  Surgeon: Kulwinder Booth MD;  Location: [facility] OR     PHACOEMULSIFICATION WITH STANDARD INTRAOCULAR LENS IMPLANT Left 9/26/2019    Procedure: LEFT PHACOEMULSIFICATION, CATARACT, WITH STANDARD IOL INSERTION;  Surgeon: Francesco Winn MD;  Location: [facility]     PHACOEMULSIFICATION WITH STANDARD INTRAOCULAR LENS IMPLANT Right 10/24/2019    Procedure: RIGHT PHACOEMULSIFICATION, CATARACT, WITH STANDARD IOL INSERTION;  Surgeon: Francesco Winn MD;  [facility]: [facility]     REPAIR PTOSIS Bilateral 08/16/2019     SALPINGO OOPHORECTOMY,R/L/JENNIFER      Salpingo Oophorectomy, RT/LT/JENNIFER     ZZC EXCIS PTERYGIUM  10/08    [facility]     ZZ STOMACH SURGERY PROCEDURE UNLISTED       ZZHC COLONOSCOPY THRU STOMA, DIAGNOSTIC  2002    normal per report-no records available-records destroyed       Family History   Problem Relation Age of Onset     Diabetes Mother      Cerebrovascular Disease Mother      Diabetes Father      Hypertension Father      Cancer Maternal Grandfather      Cancer Paternal Grandfather      Diabetes Brother      Diabetes Sister      Thyroid Disease Daughter      Thyroid Disease Sister      Multiple Sclerosis Daughter      Glaucoma No family hx of      Macular Degeneration No  family hx of        Social History     Tobacco Use     Smoking status: Former     Packs/day: 0.00     Types: Cigarettes     Start date: 1974     Quit date: 1975     Years since quittin.1     Passive exposure: Never     Smokeless tobacco: Never   Substance Use Topics     Alcohol use: Yes     Alcohol/week: 0.0 standard drinks of alcohol     Comment: social occasions         Exam:    Vitals: /65   Pulse 70   BMI: There is no height or weight on file to calculate BMI.  Height: Data Unavailable    Constitutional/ general:  Pt is in no apparent distress, appears well-nourished.  Cooperative with history and physical exam.     Psych:  The patient answered questions appropriately.  Normal affect.  Seems to have reasonable expectations, in terms of treatment.     Lungs:  Non labored breathing, non labored speech. No cough.  No audible wheezing. Even, quiet breathing.       Vascular: Pedal pulses palpable.  Ankle edema noted.    Neuro:  Alert and oriented x 3. Coordinated gait.  Light touch sensation is intact     Derm: Normal texture and turgor.  No erythema, ecchymosis, or cyanosis.      Musculoskeletal:    Lower extremity muscle strength is normal.  Patient is ambulatory with right knee brace.  Long right second toe.  The nail on the second toe is long.  We trimmed this detention back.  Dried blood noted under the medial border.  There is no erythema edema or drainage.      Hemoglobin A1C0.0 - 5.6 %7.9 High 8.7 High  CM9.1 High 8.5 High  CM10.1 High  CM8.9 Abnormal  R10.5 High  CMComment: Normal <5.7%           A/P    Diabetes mellitus poorly controlled   subungual hematoma    We trimmed back the right second nail.  The underlying nailbed is healthy.  We did note a small amount of dried blood underneath the nail.  Remaining nails intact.  Discussed the mechanics of her toe causing repetitive trauma which can lead to bleeding under the nail.  Make sure shoes do not hit nail.  We will keep nails shorter.   Discussed to ensure no skin lesion recommend biopsy of nail bed.  She is having upcoming knee surgery and does not want anything to jeopardize this.  Once she has recovered from this will return to clinic for biopsy.    Salvador Billingsley DPM, FACFAS      Again, thank you for allowing me to participate in the care of your patient.        Sincerely,        Salvador Billingsley DPM

## 2023-09-06 NOTE — TELEPHONE ENCOUNTER
Home Care is calling regarding an established patient with [facility].        6/8/2023     3:15 PM   Home Care Information   Date of Home Care episode start 6/8/2023   Current following provider Dr. Tiffany King    Name/Phone Number Greg (RN Case Manager), [phone]   Home Care agency [facility]     Requesting orders from: Tiffany King  Provider is following patient: Yes  Is this a 60-day recertification request?  No    Orders Requested    Skilled Nursing  Request for discontinuation of care   Goals have not been met/not progressing.    Barriers to care: Patient started outpatient physical therapy last week per patient's request. Due to this, she is unable to maintain home care services with [facility] at the same time as receiving outpatient therapy. Patient is also planned to have right knee surgery on 10/2/2023. Due to multiple factors, home care has discharged patient as of today 9/6/2023.      Routing to provider as an update.    EVELYN Rajput  [facility] Primary Care Triage

## 2023-09-07 ENCOUNTER — VIRTUAL VISIT (OUTPATIENT)
Dept: FAMILY MEDICINE | Facility: CLINIC | Age: 68
End: 2023-09-07
Payer: COMMERCIAL

## 2023-09-07 DIAGNOSIS — Z86.711 HISTORY OF PULMONARY EMBOLISM: ICD-10-CM

## 2023-09-07 DIAGNOSIS — M25.561 CHRONIC PAIN OF RIGHT KNEE: ICD-10-CM

## 2023-09-07 DIAGNOSIS — M17.11 PRIMARY OSTEOARTHRITIS OF RIGHT KNEE: Primary | ICD-10-CM

## 2023-09-07 DIAGNOSIS — F11.90 OPIOID USE DISORDER: ICD-10-CM

## 2023-09-07 DIAGNOSIS — G89.29 CHRONIC PAIN OF RIGHT KNEE: ICD-10-CM

## 2023-09-07 DIAGNOSIS — Z91.148 NONCOMPLIANCE WITH MEDICATION REGIMEN: Chronic | ICD-10-CM

## 2023-09-07 PROCEDURE — 99215 OFFICE O/P EST HI 40 MIN: CPT | Mod: 95 | Performed by: INTERNAL MEDICINE

## 2023-09-07 RX ORDER — OXYCODONE HYDROCHLORIDE 5 MG/1
TABLET ORAL
Qty: 28 TABLET | Refills: 0 | Status: SHIPPED | OUTPATIENT
Start: 2023-09-08 | End: 2023-09-15

## 2023-09-07 ASSESSMENT — PATIENT HEALTH QUESTIONNAIRE - PHQ9: SUM OF ALL RESPONSES TO PHQ QUESTIONS 1-9: 17

## 2023-09-07 NOTE — PROGRESS NOTES
Holly is a 68 year old who is being evaluated via a billable video visit.      How would you like to obtain your AVS? MyChart  If the video visit is dropped, the invitation should be resent by: Text to cell phone: [phone]  Will anyone else be joining your video visit? No          Assessment & Plan     Holly was seen today for knee pain.    Diagnoses and all orders for this visit:    Primary osteoarthritis of right knee  -     oxyCODONE (ROXICODONE) 5 MG tablet; Take 1 tablet (5 mg) by mouth 3 times daily. May also take 1 tablet (5 mg) daily as needed for severe pain or breakthrough pain. Do all this for 7 days.    Chronic pain of right knee  -     oxyCODONE (ROXICODONE) 5 MG tablet; Take 1 tablet (5 mg) by mouth 3 times daily. May also take 1 tablet (5 mg) daily as needed for severe pain or breakthrough pain. Do all this for 7 days.    Opioid use disorder  -     oxyCODONE (ROXICODONE) 5 MG tablet; Take 1 tablet (5 mg) by mouth 3 times daily. May also take 1 tablet (5 mg) daily as needed for severe pain or breakthrough pain. Do all this for 7 days.    History of pulmonary embolism  Comments:  anticoagulation until 2 months after surgery per heme. Refilled for 3 months with surgery date of 10/2/23.  Orders:  -     apixaban ANTICOAGULANT (ELIQUIS) 5 MG tablet; Take 1 tablet (5 mg) by mouth 2 times daily    Noncompliance with medication regimen  Comments:  overuse of oxycodone, not taking gabapentin as scheduled.       She has severe DJD and is expected to have surgery on 10/2/2023.   However, she also has misuse of her medications. She has 20 pills short based on her own report of oxycodone use. She has not filled gabapentin as scheduled. When she went to the ER, she would have been without gabapentin for one week (due for refill around 8/24/2023), ER visit 8/30/2023. Likely had exacerbated pain due to going off gabapentin.     I discussed that the short of the 20 pills is non compliance with pain medication  agreement. Given that she does have severe disease and with the anticipated surgery, I would refill her oxycodone on a weekly basis starting tomorrow. Allow her to take one extra oxycodone a day, so max at 4 tablets a day until surgery. She needs to get back on gabapentin today.      There will be no early refills at this point.      I spent a total of 46 minutes on the day of the visit.   doing chart review, history and exam, documentation and further activities as noted above     Tiffany King MD PhD  [facility] LARRY Barrera is a 68 year old, presenting for the following health issues:  Knee Pain        Patient has severe right knee DJD and is expected to have surgery on 10/2/2023.   Last week the pain was severe, went to ER. Xray in ED showed worsening DJD per xray imaging.   Pt reported she took 2 pills at a time on two occasions. Her pain is 10/10. The two pills at a time helped some.     She currently has 5 tablets of oxycodone left. She reported she only took extra 1 tablet on two occasions, not multiple times a day.     8/18/2023 picked up 90 pills. She should have 30 tablets left. Currently she has 5 pills.     She is on gabapentin 1200 mg tid. Pt reported she is out of gabapentin for a few days but there is a prescription for her to .       08/18/2023 08/17/2023 1 Oxycodone Hcl (Ir) 5 Mg Tablet 90.00 30 Lo King [id number]   07/25/2023 12/16/2022 1 Gabapentin 600 Mg Tablet 180.00 30 Lo King [id number]       ER visit date 8/302023        Pt also wanted refill of Eliquis. History of PE. Was referred to hematology for recommendation of duration of anticoagulation in relation to surgery. Here is heme recommendation:    Plan:  Her anticoagulation therapy plan is as follow:    Remain on apixaban at 5 mg PO BID dosing until 2 months after her right total knee arthroplasty surgery.     She does not need indefinite anticoagulation therapy but will keep her on it until 2 months after her  right total knee arthroplasty surgery.    She is cleared from a hematological standpoint to proceed with right total knee arthroplasty surgery as of 8/7/2023.     For her right total knee arthroplasty surgery, she can simply hold her apixaban for 48 hours prior to the surgery and then restart it the day after her surgery assuming that she is hemostatically stable.          Pain History:  When did you first notice your pain? A few months    Have you seen this provider for your pain in the past? Yes   Where in your body do you have pain? Knee Pain   Are you seeing anyone else for your pain? Yes - Dr. Sheehan and Dr. Harrington         7/5/2023     9:20 AM 7/14/2023     9:36 AM 9/7/2023     7:45 AM   PHQ-9 SCORE   PHQ-9 Total Score MyChart  9 (Mild depression)    PHQ-9 Total Score 24 9 17           5/18/2023    10:20 AM 6/7/2023    10:08 AM 7/14/2023     9:49 AM   ISABELA-7 SCORE   Total Score 2 (minimal anxiety) 17 (severe anxiety) 8 (mild anxiety)   Total Score 2 17 8               7/5/2023     9:20 AM 7/14/2023     9:36 AM 9/7/2023     7:45 AM   PHQ-9 SCORE   PHQ-9 Total Score MyChart  9 (Mild depression)    PHQ-9 Total Score 24 9 17           7/14/2022     4:28 PM 9/7/2023     7:43 AM   PEG Score   PEG Total Score 8.33 10       PDMP Review         Value Time User    State PDMP site checked  Yes 8/17/2023  1:38 PM Tiffany King MD PhD          Last CSA Agreement:   CSA -- Patient Level:     [Media Unavailable] Controlled Substance Agreement - Opioid - Scan on 4/27/2023  2:59 PM   [Media Unavailable] Controlled Substance Agreement - Opioid - Scan on 4/15/2021  9:53 AM       Last UDS: 5/1/2023        Review of Systems   Constitutional, HEENT, cardiovascular, pulmonary, gi and gu systems are negative, except as otherwise noted.      Objective           Vitals:  No vitals were obtained today due to virtual visit.    Physical Exam   GENERAL: Healthy, alert and no distress  EYES: Eyes grossly normal to inspection.  No discharge or  erythema, or obvious scleral/conjunctival abnormalities.  RESP: No audible wheeze, cough, or visible cyanosis.  No visible retractions or increased work of breathing.    SKIN: Visible skin clear. No significant rash, abnormal pigmentation or lesions.  NEURO: Cranial nerves grossly intact.  Mentation and speech appropriate for age.  PSYCH: Mentation appears normal, affect normal/bright, judgement and insight intact, normal speech and appearance well-groomed.    Video-Visit Details    Type of service:  Video Visit   Video Start Time:  8:48 AM  Video End Time: 9:09 AM    Originating Location (pt. Location): Home    Distant Location (provider location):  Off-site  Platform used for Video Visit: Spyder Lynk

## 2023-09-08 ENCOUNTER — INFUSION THERAPY VISIT (OUTPATIENT)
Dept: INFUSION THERAPY | Facility: CLINIC | Age: 68
End: 2023-09-08
Payer: COMMERCIAL

## 2023-09-08 VITALS
SYSTOLIC BLOOD PRESSURE: 113 MMHG | TEMPERATURE: 97.8 F | HEART RATE: 74 BPM | DIASTOLIC BLOOD PRESSURE: 69 MMHG | WEIGHT: 187.5 LBS | BODY MASS INDEX: 35.14 KG/M2 | RESPIRATION RATE: 16 BRPM | OXYGEN SATURATION: 99 %

## 2023-09-08 DIAGNOSIS — D50.8 IRON DEFICIENCY ANEMIA SECONDARY TO INADEQUATE DIETARY IRON INTAKE: Primary | ICD-10-CM

## 2023-09-08 DIAGNOSIS — Z93.3 S/P COLOSTOMY (H): ICD-10-CM

## 2023-09-08 DIAGNOSIS — Z90.49 S/P PARTIAL COLECTOMY: ICD-10-CM

## 2023-09-08 PROCEDURE — 96366 THER/PROPH/DIAG IV INF ADDON: CPT | Performed by: PHYSICIAN ASSISTANT

## 2023-09-08 PROCEDURE — 96365 THER/PROPH/DIAG IV INF INIT: CPT | Performed by: PHYSICIAN ASSISTANT

## 2023-09-08 RX ORDER — DIPHENHYDRAMINE HYDROCHLORIDE 50 MG/ML
50 INJECTION INTRAMUSCULAR; INTRAVENOUS
Status: CANCELLED
Start: 2023-09-08

## 2023-09-08 RX ORDER — ALBUTEROL SULFATE 0.83 MG/ML
2.5 SOLUTION RESPIRATORY (INHALATION)
Status: CANCELLED | OUTPATIENT
Start: 2023-09-08

## 2023-09-08 RX ORDER — ALBUTEROL SULFATE 90 UG/1
1-2 AEROSOL, METERED RESPIRATORY (INHALATION)
Status: CANCELLED
Start: 2023-09-08

## 2023-09-08 RX ORDER — HEPARIN SODIUM,PORCINE 10 UNIT/ML
5-20 VIAL (ML) INTRAVENOUS DAILY PRN
Status: CANCELLED | OUTPATIENT
Start: 2023-09-08

## 2023-09-08 RX ORDER — METHYLPREDNISOLONE SODIUM SUCCINATE 125 MG/2ML
125 INJECTION, POWDER, LYOPHILIZED, FOR SOLUTION INTRAMUSCULAR; INTRAVENOUS
Status: CANCELLED
Start: 2023-09-08

## 2023-09-08 RX ORDER — EPINEPHRINE 1 MG/ML
0.3 INJECTION, SOLUTION INTRAMUSCULAR; SUBCUTANEOUS EVERY 5 MIN PRN
Status: CANCELLED | OUTPATIENT
Start: 2023-09-08

## 2023-09-08 RX ORDER — HEPARIN SODIUM (PORCINE) LOCK FLUSH IV SOLN 100 UNIT/ML 100 UNIT/ML
5 SOLUTION INTRAVENOUS
Status: CANCELLED | OUTPATIENT
Start: 2023-09-08

## 2023-09-08 RX ORDER — MEPERIDINE HYDROCHLORIDE 25 MG/ML
25 INJECTION INTRAMUSCULAR; INTRAVENOUS; SUBCUTANEOUS EVERY 30 MIN PRN
Status: CANCELLED | OUTPATIENT
Start: 2023-09-08

## 2023-09-08 RX ADMIN — Medication 250 ML: at 10:38

## 2023-09-08 NOTE — PROGRESS NOTES
Infusion Nursing Note:  Holly Muir presents today for NEW infed.    Patient seen by provider today: No   present during visit today: Not Applicable.    Note: This is the pts first time to [facility]. Orientation provided to infusion center, pt also instructed on how and when to use her call light. Questions answered to pt satisfaction. RN reinforced education and provided educational handouts on   Infed. Patient expressed no further questions and verbalized understanding.     Intravenous Access:  Peripheral IV placed.    Treatment Conditions:  Test dose given over 5 minutes, 1 hour obs, no reaction. Infed infused. Patient tolerated infusion well.    Post Infusion Assessment:  Patient tolerated infusion without incident.  Site patent and intact, free from redness, edema or discomfort.  No evidence of extravasations.  Access discontinued per protocol.       Discharge Plan:   Discharge instructions reviewed with: Patient.  Patient and/or family verbalized understanding of discharge instructions and all questions answered.  Patient discharged in stable condition accompanied by: self.  Departure Mode: Ambulatory.  Future appts have been reviewed and crosschecked with appt note and plan.      Za Kaur RN

## 2023-09-14 ENCOUNTER — THERAPY VISIT (OUTPATIENT)
Dept: PHYSICAL THERAPY | Facility: CLINIC | Age: 68
End: 2023-09-14
Attending: ORTHOPAEDIC SURGERY
Payer: COMMERCIAL

## 2023-09-14 DIAGNOSIS — M17.11 PRIMARY LOCALIZED OSTEOARTHRITIS OF RIGHT KNEE: Primary | ICD-10-CM

## 2023-09-14 PROCEDURE — 97140 MANUAL THERAPY 1/> REGIONS: CPT | Mod: GP | Performed by: PHYSICAL THERAPIST

## 2023-09-14 PROCEDURE — 97110 THERAPEUTIC EXERCISES: CPT | Mod: GP | Performed by: PHYSICAL THERAPIST

## 2023-09-15 ENCOUNTER — OFFICE VISIT (OUTPATIENT)
Dept: FAMILY MEDICINE | Facility: CLINIC | Age: 68
End: 2023-09-15
Payer: COMMERCIAL

## 2023-09-15 VITALS
DIASTOLIC BLOOD PRESSURE: 73 MMHG | HEIGHT: 61 IN | HEART RATE: 69 BPM | TEMPERATURE: 97.9 F | BODY MASS INDEX: 36.25 KG/M2 | SYSTOLIC BLOOD PRESSURE: 114 MMHG | RESPIRATION RATE: 14 BRPM | OXYGEN SATURATION: 100 % | WEIGHT: 192 LBS

## 2023-09-15 DIAGNOSIS — Z01.818 PREOP GENERAL PHYSICAL EXAM: Primary | ICD-10-CM

## 2023-09-15 DIAGNOSIS — Z79.01 ANTICOAGULATED BY ANTICOAGULATION TREATMENT: ICD-10-CM

## 2023-09-15 DIAGNOSIS — E11.42 DIABETIC POLYNEUROPATHY ASSOCIATED WITH TYPE 2 DIABETES MELLITUS (H): ICD-10-CM

## 2023-09-15 DIAGNOSIS — Z90.49 S/P PARTIAL COLECTOMY: ICD-10-CM

## 2023-09-15 DIAGNOSIS — M17.11 PRIMARY OSTEOARTHRITIS OF RIGHT KNEE: ICD-10-CM

## 2023-09-15 DIAGNOSIS — E11.9 TYPE 2 DIABETES MELLITUS WITHOUT COMPLICATION, WITH LONG-TERM CURRENT USE OF INSULIN (H): ICD-10-CM

## 2023-09-15 DIAGNOSIS — Z93.3 S/P COLOSTOMY (H): ICD-10-CM

## 2023-09-15 DIAGNOSIS — F11.20 CONTINUOUS OPIOID DEPENDENCE (H): ICD-10-CM

## 2023-09-15 DIAGNOSIS — Z79.4 TYPE 2 DIABETES MELLITUS WITHOUT COMPLICATION, WITH LONG-TERM CURRENT USE OF INSULIN (H): ICD-10-CM

## 2023-09-15 DIAGNOSIS — D50.8 IRON DEFICIENCY ANEMIA SECONDARY TO INADEQUATE DIETARY IRON INTAKE: ICD-10-CM

## 2023-09-15 LAB
ALBUMIN SERPL BCG-MCNC: 4.1 G/DL (ref 3.5–5.2)
ALP SERPL-CCNC: 71 U/L (ref 35–104)
ALT SERPL W P-5'-P-CCNC: 13 U/L (ref 0–50)
ANION GAP SERPL CALCULATED.3IONS-SCNC: 12 MMOL/L (ref 7–15)
AST SERPL W P-5'-P-CCNC: 18 U/L (ref 0–45)
BILIRUB SERPL-MCNC: 0.2 MG/DL
BUN SERPL-MCNC: 12.1 MG/DL (ref 8–23)
CALCIUM SERPL-MCNC: 9.4 MG/DL (ref 8.8–10.2)
CHLORIDE SERPL-SCNC: 97 MMOL/L (ref 98–107)
CREAT SERPL-MCNC: 0.74 MG/DL (ref 0.51–0.95)
CREAT UR-MCNC: 56.8 MG/DL
DEPRECATED HCO3 PLAS-SCNC: 24 MMOL/L (ref 22–29)
EGFRCR SERPLBLD CKD-EPI 2021: 88 ML/MIN/1.73M2
ERYTHROCYTE [DISTWIDTH] IN BLOOD BY AUTOMATED COUNT: 15.3 % (ref 10–15)
FERRITIN SERPL-MCNC: 681 NG/ML (ref 11–328)
GLUCOSE SERPL-MCNC: 133 MG/DL (ref 70–99)
HBA1C MFR BLD: 7.7 % (ref 0–5.6)
HCT VFR BLD AUTO: 29.6 % (ref 35–47)
HGB BLD-MCNC: 9.6 G/DL (ref 11.7–15.7)
IRON BINDING CAPACITY (ROCHE): 291 UG/DL (ref 240–430)
IRON SATN MFR SERPL: 22 % (ref 15–46)
IRON SERPL-MCNC: 63 UG/DL (ref 37–145)
MCH RBC QN AUTO: 26.8 PG (ref 26.5–33)
MCHC RBC AUTO-ENTMCNC: 32.4 G/DL (ref 31.5–36.5)
MCV RBC AUTO: 83 FL (ref 78–100)
MICROALBUMIN UR-MCNC: 36.5 MG/L
MICROALBUMIN/CREAT UR: 64.26 MG/G CR (ref 0–25)
PLATELET # BLD AUTO: 356 10E3/UL (ref 150–450)
POTASSIUM SERPL-SCNC: 5.1 MMOL/L (ref 3.4–5.3)
PROT SERPL-MCNC: 6.9 G/DL (ref 6.4–8.3)
RBC # BLD AUTO: 3.58 10E6/UL (ref 3.8–5.2)
RETICS # AUTO: 0.05 10E6/UL (ref 0.03–0.1)
RETICS/RBC NFR AUTO: 1.3 % (ref 0.5–2)
SODIUM SERPL-SCNC: 133 MMOL/L (ref 136–145)
WBC # BLD AUTO: 5.2 10E3/UL (ref 4–11)

## 2023-09-15 PROCEDURE — 83036 HEMOGLOBIN GLYCOSYLATED A1C: CPT | Performed by: INTERNAL MEDICINE

## 2023-09-15 PROCEDURE — 83540 ASSAY OF IRON: CPT | Performed by: INTERNAL MEDICINE

## 2023-09-15 PROCEDURE — 85045 AUTOMATED RETICULOCYTE COUNT: CPT | Performed by: INTERNAL MEDICINE

## 2023-09-15 PROCEDURE — 80053 COMPREHEN METABOLIC PANEL: CPT | Performed by: INTERNAL MEDICINE

## 2023-09-15 PROCEDURE — 99215 OFFICE O/P EST HI 40 MIN: CPT | Mod: 25 | Performed by: INTERNAL MEDICINE

## 2023-09-15 PROCEDURE — 82570 ASSAY OF URINE CREATININE: CPT | Performed by: INTERNAL MEDICINE

## 2023-09-15 PROCEDURE — 82043 UR ALBUMIN QUANTITATIVE: CPT | Performed by: INTERNAL MEDICINE

## 2023-09-15 PROCEDURE — 82728 ASSAY OF FERRITIN: CPT | Performed by: INTERNAL MEDICINE

## 2023-09-15 PROCEDURE — 85027 COMPLETE CBC AUTOMATED: CPT | Performed by: INTERNAL MEDICINE

## 2023-09-15 PROCEDURE — G0008 ADMIN INFLUENZA VIRUS VAC: HCPCS | Performed by: INTERNAL MEDICINE

## 2023-09-15 PROCEDURE — 83550 IRON BINDING TEST: CPT | Performed by: INTERNAL MEDICINE

## 2023-09-15 PROCEDURE — 90662 IIV NO PRSV INCREASED AG IM: CPT | Performed by: INTERNAL MEDICINE

## 2023-09-15 PROCEDURE — 36415 COLL VENOUS BLD VENIPUNCTURE: CPT | Performed by: INTERNAL MEDICINE

## 2023-09-15 RX ORDER — ARIPIPRAZOLE 20 MG/1
1 TABLET ORAL EVERY EVENING
COMMUNITY
Start: 2023-08-24 | End: 2023-11-21

## 2023-09-15 ASSESSMENT — PAIN SCALES - GENERAL: PAINLEVEL: SEVERE PAIN (7)

## 2023-09-15 ASSESSMENT — PATIENT HEALTH QUESTIONNAIRE - PHQ9
SUM OF ALL RESPONSES TO PHQ QUESTIONS 1-9: 14
SUM OF ALL RESPONSES TO PHQ QUESTIONS 1-9: 14
10. IF YOU CHECKED OFF ANY PROBLEMS, HOW DIFFICULT HAVE THESE PROBLEMS MADE IT FOR YOU TO DO YOUR WORK, TAKE CARE OF THINGS AT HOME, OR GET ALONG WITH OTHER PEOPLE: SOMEWHAT DIFFICULT

## 2023-09-15 NOTE — NURSING NOTE
Prior to immunization administration, verified patients identity using patient s name and date of birth. Please see Immunization Activity for additional information.     Screening Questionnaire for Adult Immunization    Are you sick today?   No   Do you have allergies to medications, food, a vaccine component or latex?   No   Have you ever had a serious reaction after receiving a vaccination?   No   Do you have a long-term health problem with heart, lung, kidney, or metabolic disease (e.g., diabetes), asthma, a blood disorder, no spleen, complement component deficiency, a cochlear implant, or a spinal fluid leak?  Are you on long-term aspirin therapy?   No   Do you have cancer, leukemia, HIV/AIDS, or any other immune system problem?   No   Do you have a parent, brother, or sister with an immune system problem?   No   In the past 3 months, have you taken medications that affect  your immune system, such as prednisone, other steroids, or anticancer drugs; drugs for the treatment of rheumatoid arthritis, Crohn s disease, or psoriasis; or have you had radiation treatments?   No   Have you had a seizure, or a brain or other nervous system problem?   No   During the past year, have you received a transfusion of blood or blood    products, or been given immune (gamma) globulin or antiviral drug?   No   For women: Are you pregnant or is there a chance you could become       pregnant during the next month?   No   Have you received any vaccinations in the past 4 weeks?   No     Immunization questionnaire answers were all negative.      Patient instructed to remain in clinic for 15 minutes afterwards, and to report any adverse reactions.     Screening performed by Cait Leigh MA on 9/15/2023 at 11:31 AM.

## 2023-09-15 NOTE — PROGRESS NOTES
[address] [address]  Phone: [phone]  Primary Provider: Tiffany Charles  Pre-op Performing Provider: TIFFANY CHARLES      PREOPERATIVE EVALUATION:  Today's date: 9/15/2023    Holly Muir is a 68 year old female who presents for a preoperative evaluation.    Surgery/Procedure: ARTHROPLASTY, RIGHT KNEE, TOTAL   Surgery Location: [facility]  Surgeon: Sanford Sheehan MD   Surgery Date: 10/02/2023  Time of Surgery: 12:20 pm  Where patient plans to recover: Other: Overnight at hospital  Fax number for surgical facility: Note does not need to be faxed, will be available electronically in Epic.  1. No - Have you ever had a heart attack or stroke?  2. No - Have you ever had surgery on your heart or blood vessels, such as a stent, coronary (heart) bypass, or surgery on an artery in the head, neck, heart, or legs?  3. No - Do you have chest pain when you are physically active?  4. No - Do you have a history of heart failure?  5. No - Do you currently have a cold, bronchitis, or symptoms of other respiratory (head and chest) infections?  6. No - Do you have a cough, shortness of breath, or wheezing?  7. No - Do you or anyone in your family have a history of blood clots?  8. No - Do you or anyone in your family have a serious bleeding problem, such as long-lasting bleeding after surgeries or cuts?  9. No - Have you ever had anemia or been told to take iron pills?  10. No - Have you had any abnormal blood loss such as black, tarry or bloody stools, or abnormal vaginal bleeding?  11. No - Have you ever had a blood transfusion?  12. Yes - Are you willing to have a blood transfusion if it is medically needed before, during, or after your surgery?  13. No - Have you or anyone in your family ever had problems with anesthesia (sedation for surgery)?  14. No - Do you have sleep apnea, excessive snoring, or daytime drowsiness?   15. No - Do you have any artifical  heart valves or other implanted medical devices, such as a pacemaker, defibrillator, or continuous glucose monitor?  16. No - Do you have any artifical joints?  17. No - Are you allergic to latex?  18. No - Is there any chance that you may be pregnant?       Assessment & Plan     The proposed surgical procedure is considered INTERMEDIATE risk.    Holly was seen today for physical.    Diagnoses and all orders for this visit:    Preop general physical exam    Primary osteoarthritis of right knee    Type 2 diabetes mellitus without complication, with long-term current use of insulin (H)  -     Albumin Random Urine Quantitative with Creat Ratio  -     Comprehensive metabolic panel  -     Hemoglobin A1c    Iron deficiency anemia secondary to inadequate dietary iron intake  Comments:  monitor hemoglobin postoperatively. transfuse if needed.  Orders:  -     CBC with platelets  -     FERRITIN  -     IRON AND IRON BINDING CAPACITY  -     Reticulocyte count    S/P colostomy (H)  -     CBC with platelets  -     FERRITIN  -     IRON AND IRON BINDING CAPACITY  -     Reticulocyte count    Diabetic polyneuropathy associated with type 2 diabetes mellitus (H)    S/P partial colectomy  -     CBC with platelets  -     FERRITIN  -     IRON AND IRON BINDING CAPACITY  -     Reticulocyte count    F11.2 - Continuous opioid dependence (H)  Comments:  recommend inpatient pain consult.    Anticoagulated by anticoagulation treatment  Comments:  continue Eliquis for 2 months postoperatively and then discontinue.    Other orders  -     INFLUENZA VACCINE 65+ (FLUZONE HD)  -     PRIMARY CARE FOLLOW-UP SCHEDULING; Future       .preo         - No identified additional risk factors other than previously addressed    Antiplatelet or Anticoagulation Medication Instructions:   - aspirin: Discontinue aspirin 7-10 days prior to procedure to reduce bleeding risk. It should be resumed postoperatively.    - apixaban (Eliquis), edoxaban (Savaysa), rivaroxaban  (Xarelto): Bleeding risk is moderate or high for this procedure AND CrCl  (>=) 50 mL/min. HOLD 2 days before surgery.     Additional Medication Instructions:   - Long acting insulin (e.g. glargine, detemir): Take 80% of the usual evening or morning dose before surgery.   - metformin: HOLD day of surgery.   - sulfonylurea (e.g. glyburide, glipizide): HOLD day of surgery   - Thiazolidinedione (e.g. rosiglitazone, pioglitazone): HOLD day of surgery.   - SSRIs, SNRIs, TCAs, Antipsychotics: Continue without modification.     RECOMMENDATION:  APPROVAL GIVEN to proceed with proposed procedure, without further diagnostic evaluation.          Subjective       HPI related to upcoming procedure: severe right knee DJD, scheduled for right knee replacement     Holly Muir is a 68 year old female who has Hyperlipidemia LDL goal <100; Severe major depression with psychotic features (H); History of cervical cancer; S/P colostomy (H); Memory loss; OCD (obsessive compulsive disorder); HTN, goal below 140/90; Hypoglycemia due to insulin; Encounter for long-term (current) use of insulin (H); Colostomy, evaluate (H); Bilateral low back pain without sciatica; Type 2 diabetes mellitus with hyperglycemia, with long-term current use of insulin (H); Diabetic neuropathy (H); Schizoaffective disorder (H); Unspecified inflammatory spondylopathy, lumbar region (H); and Chronic kidney disease, stage 3a (H) on their pertinent problem list.      Overall doing better. Has right knee brace. Pain better managed with going back on gabapentin and increase oxycodone to 5 mg tid prn.     Has seen hematology for iron deficiency anemia and provoked PE.     Heme recommendations are follow:   Diagnosis:  Provoked small burden pulmonary embolism on 5/7/2023 without evidence of lower extremity DVT.   S/P partial colectomy in 2002 with colostomy creation while she was residing in [place].   Chronic anemic (at least since 2020). Likely a combination of  iron deficiency and anemia of chronic disease.   Severe osteoarthritis of the right knee. Anticipating the need of right total knee arthroplasty in the near future.     Plan:  Her anticoagulation therapy plan is as follow:  Remain on apixaban at 5 mg PO BID dosing until 2 months after her right total knee arthroplasty surgery.   She does not need indefinite anticoagulation therapy but will keep her on it until 2 months after her right total knee arthroplasty surgery.  She is cleared from a hematological standpoint to proceed with right total knee arthroplasty surgery as of 8/7/2023.   For her right total knee arthroplasty surgery, she can simply hold her apixaban for 48 hours prior to the surgery and then restart it the day after her surgery assuming that she is hemostatically stable.     Health Care Directive:  Patient does not have a Health Care Directive or Living Will: Discussed advance care planning with patient; information given to patient to review.    Preoperative Review of :   reviewed - controlled substances reflected in medication list.        Review of Systems  Constitutional, HEENT, cardiovascular, pulmonary, gi and gu systems are negative, except as otherwise noted.     Patient Active Problem List    Diagnosis Date Noted    Chronic kidney disease, stage 3a (H) 11/17/2021     Priority: High    Unspecified inflammatory spondylopathy, lumbar region (H) 09/16/2021     Priority: High    Diabetic neuropathy (H) 01/22/2019     Priority: High    Schizoaffective disorder (H) 01/22/2019     Priority: High    Type 2 diabetes mellitus with hyperglycemia, with long-term current use of insulin (H) 12/23/2015     Priority: High    Bilateral low back pain without sciatica 06/17/2015     Priority: High    Colostomy, evaluate (H) 10/17/2014     Priority: High    Encounter for long-term (current) use of insulin (H) 10/12/2014     Priority: High    Hypoglycemia due to insulin 09/17/2014     Priority: High    OCD  (obsessive compulsive disorder) 05/10/2012     Priority: High    HTN, goal below 140/90 05/10/2012     Priority: High    Memory loss 06/01/2010     Priority: High     Diabetes coma around 2003      S/P colostomy (H) 04/03/2010     Priority: High     Due to injury to colon while she had a hysterectomy (age late 30s):  Seen Dr. Doherty in colorectal surgery at [facility]-does not want to operated. Needs her social support and memory loss issues be resolved first.       History of cervical cancer 09/23/2009     Priority: High     status post NATALIE/BSO. Being followed by Dr. Angelita Addison at [facility] gyn/onc      Severe major depression with psychotic features (H) 07/31/2009     Priority: High     Psychiatrist: Dr. Perkins at [facility].   Neuropsychological evalation at [facility] on 12/1/09: lower end cognitive functioning and multifactorial in etiologies including effects of psychosis, medications nd perhaps non-restorative sleep. Also has features of OCD. Recommend psychiatry referral for further evaluation of past Dx of schizophrenia and also psychotherapy.   Sees a therapist monthly now (has met goals) but will monitor for relapse       Hyperlipidemia LDL goal <100 01/27/2009     Priority: High    F11.2 - Continuous opioid dependence (H) 09/15/2023     Priority: Medium    Noncompliance with medication regimen 09/07/2023     Priority: Medium     overuse of oxycodone, not taking gabapentin as scheduled.      Iron deficiency anemia secondary to inadequate dietary iron intake 08/04/2023     Priority: Medium    Other acute pulmonary embolism without acute cor pulmonale (H) 05/18/2023     Priority: Medium    Pneumonia 05/07/2023     Priority: Medium    Chronic right shoulder pain 02/17/2023     Priority: Medium    Restless leg syndrome 09/22/2021     Priority: Medium    Tremor 09/22/2021     Priority: Medium    Fecal urgency 05/20/2020     Priority: Medium     Added  automatically from request for surgery [id number]      Pseudophakia of right eye 10/25/2019     Priority: Medium    Anal stenosis 06/27/2019     Priority: Medium    S/P partial colectomy 06/27/2019     Priority: Medium    GERD (gastroesophageal reflux disease) 01/23/2019     Priority: Medium    Anemia 01/22/2019     Priority: Medium    Chest pain, musculoskeletal 01/22/2019     Priority: Medium    Hyponatremia 01/22/2019     Priority: Medium    Obesity (BMI 35.0-39.9) with comorbidity (H) 11/14/2018     Priority: Medium    Visual hallucination 04/21/2016     Priority: Medium    Fecal incontinence due to anorectal disorder 09/28/2015     Priority: Medium    Anxiety 09/02/2015     Priority: Medium    ACP (advance care planning) 02/12/2015     Priority: Medium     Advance Care Planning:ACP Review and Resources Provided:  Reviewed chart for advance care plan.  Holly Muir has no plan or code status on file. Discussed available resources and provided with information. Confirmed/documented designated decision maker(s). See permanent comments section of demographics in clinical tab. Added by Zara Elias on 2/12/2015          Urinary retention 01/22/2015     Priority: Medium    Rotator cuff impingement syndrome 08/07/2014     Priority: Medium    Primary generalized (osteo)arthritis 01/29/2014     Priority: Medium    Health Care Home 09/20/2013     Priority: Medium     Date:  3-4-16  Status:  Accepted          Primary localized osteoarthritis of right knee 09/11/2013     Priority: Medium    Cataracts, both eyes 05/08/2013     Priority: Low    Dermatochalasis 05/10/2012     Priority: Low    Presbyopia 05/10/2012     Priority: Low    Pain in joint, hand 01/25/2012     Priority: Low    Trigger finger, acquired 01/25/2012     Priority: Low     Problem list name updated by automated process. Provider to review      Synovitis and tenosynovitis 01/25/2012     Priority: Low     Problem list name updated by automated process.  Provider to review      Mixed incontinence urge and stress 06/01/2010     Priority: Low      Past Medical History:   Diagnosis Date    Bilateral knee pain     Cataracts, both eyes 5/8/2013    Cervical cancer (H)     Chronic kidney disease, stage 3a (H) 11/17/2021    Diabetes     Diabetic retinopathy (H)     HTN     Inflammatory arthritis     Major depression     Memory loss     Nephropathy     OA (osteoarthritis) of knee 9/11/2013    Other and unspecified hyperlipidemia     Pterygium eye     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     Past Surgical History:   Procedure Laterality Date    ARTHROSCOPY KNEE RT/LT      LT    BLEPHAROPLASTY BILATERAL Bilateral 8/16/2019    Procedure: BILATERAL UPPER LID BLEPHAROPLASTY;  Surgeon: Randolph Cook MD;  Location: [facility]    BREAST LUMPECTOMY, RT/LT      LT-benign     CATARACT IOL, RT/LT      COLONOSCOPY  5/10/2012    Procedure:COLONOSCOPY; screening colonoscopy; Surgeon:MILLA VEGA; Location:[facility] OR    COLONOSCOPY WITH CO2 INSUFFLATION N/A 8/2/2019    Procedure: Colonoscopy with CO2 insufflation;  Surgeon: Himanshu Hi MD;  Location: [facility] OR    COLOSTOMY  2000    COLOSTOMY      HYSTERECTOMY, PAP NO LONGER INDICATED      done in [place]-cervical cancer    IMPLANT STIMULATOR SACRAL NERVE STAGE ONE Right 3/10/2015    Procedure: IMPLANT STIMULATOR SACRAL NERVE STAGE ONE;  Surgeon: Teodora Yusuf MD;  [facility]    IMPLANT STIMULATOR SACRAL NERVE STAGE TWO Right 3/31/2015    Procedure: IMPLANT STIMULATOR SACRAL NERVE STAGE TWO;  Surgeon: Teodora Yusuf MD;  [facility]    IMPLANT STIMULATOR SACRAL NERVE STAGE TWO Right 6/22/2020    Procedure: INSERTION, SACRAL NERVE STIMULATOR, STAGE 2 (replacement of interstim battery);  Surgeon: Teodora Yusuf MD;  Location: [facility]    INJECT EPIDURAL TRANSFORAMINAL Right 1/20/2023    Procedure: Right Lumbar 5-Sacral 1 Transforaminal Epidural Steroid Injection with fluoroscopic guidance and contrast;   Surgeon: Kulwinder Booth MD;  Location: [facility]    INJECT EPIDURAL TRANSFORAMINAL Right 3/23/2023    Procedure: Right Lumbar 5-Sacral 1 Transforaminal Epidural Steroid Injection with fluoroscopic guidance and contrast.;  Surgeon: Kulwinder Booth MD;  Location: [facility]    PHACOEMULSIFICATION WITH STANDARD INTRAOCULAR LENS IMPLANT Left 9/26/2019    Procedure: LEFT PHACOEMULSIFICATION, CATARACT, WITH STANDARD IOL INSERTION;  Surgeon: Francesco Winn MD;  Location: [facility]    PHACOEMULSIFICATION WITH STANDARD INTRAOCULAR LENS IMPLANT Right 10/24/2019    Procedure: RIGHT PHACOEMULSIFICATION, CATARACT, WITH STANDARD IOL INSERTION;  Surgeon: Francesco Winn MD;  Location: [facility]    REPAIR PTOSIS Bilateral 08/16/2019    SALPINGO OOPHORECTOMY,R/L/JENNIFER      Salpingo Oophorectomy, RT/LT/JENNIFER    ZZC EXCIS PTERYGIUM  10/08    [facility]    Z STOMACH SURGERY PROCEDURE UNLISTED      ZZHC COLONOSCOPY THRU STOMA, DIAGNOSTIC  2002    normal per report-no records available-records destroyed     Current Outpatient Medications   Medication Sig Dispense Refill    ACETAMINOPHEN EXTRA STRENGTH 500 MG tablet TAKE 2 TABLETS BY MOUTH EVERY 8 HOURS. MAX ACETAMINOPHEN DOSE: 4000MG IN 24 HRS. 180 tablet 1    apixaban ANTICOAGULANT (ELIQUIS) 5 MG tablet Take 1 tablet (5 mg) by mouth 2 times daily 60 tablet 2    ARIPiprazole (ABILIFY) 15 MG tablet Take 15 mg by mouth At Bedtime      ARIPiprazole (ABILIFY) 20 MG tablet Take 1 tablet by mouth daily at 2 pm      blood glucose (NO BRAND SPECIFIED) test strip Use to test blood sugar 4 times daily or as directed. accu check guide strips 360 strip 3    blood glucose monitoring (NO BRAND SPECIFIED) meter device kit Use to test blood sugar 4  times daily or as directed. Accuceck Guide meter 1 kit 1    blood glucose monitoring (ONE TOUCH ULTRA 2) meter device kit USE TO TEST BLOOD SUGAR 4 TIMES DAILY OR AS DIRECTED. ACCUCECK GUIDE METER      blood glucose monitoring (ULTRA THIN 30G) lancets Use  to test blood sugar 4  times daily or as directed.lancets for acchu check guide 400 each 3    buPROPion (WELLBUTRIN SR) 200 MG 12 hr tablet Take 1 tablet by mouth daily at 2 pm      busPIRone HCl (BUSPAR) 30 MG tablet TAKE 1 TABLET BY MOUTH TWICE A DAY 60 tablet 5    cetirizine (ZYRTEC) 10 MG tablet Take 1 tablet (10 mg) by mouth daily 90 tablet 1    Continuous Blood Gluc  (FREESTYLE CM 2 READER) GALINA 1 Dose continuous 1 each 0    Continuous Blood Gluc Sensor (FREESTYLE CM 2 SENSOR) MISC 1 Dose continuous 9 each 3    [employer] ASPIRIN ADULT LOW DOSE 81 MG chewable tablet TAKE 1 TABLET BY MOUTH EVERY DAY 90 tablet 0    diclofenac (VOLTAREN) 1 % topical gel Apply 4 g topically 4 times daily 350 g 3    ferrous gluconate (FERGON) 324 (38 Fe) MG tablet TAKE 1 TABLET (324 MG) BY MOUTH DAILY (WITH BREAKFAST) 90 tablet 3    gabapentin (NEURONTIN) 600 MG tablet TAKE 2 TABLETS (1,200 MG) BY MOUTH 3 TIMES DAILY FOR 30 DAYS 180 tablet 5    glimepiride (AMARYL) 2 MG tablet Take 2 tablets (4 mg) by mouth 2 times daily (before meals) 360 tablet 3    insulin aspart (NOVOLOG FLEXPEN) 100 UNIT/ML pen Using as needed for high blood sugars      insulin glargine (LANTUS SOLOSTAR) 100 UNIT/ML pen Inject 35-42 units at bedtime as directed + 2 units for priming of pen. Max dose 44 units. 45 mL 1    insulin pen needle (31G X 5 MM) 31G X 5 MM miscellaneous Use four times 4 day times a day 200 each 3    metFORMIN (GLUCOPHAGE XR) 500 MG 24 hr tablet Take two tablets with breakfast and one tablet with dinner for one week then increase to two tablets with breakfast and two tablets with dinner thereafter 360 tablet 1    Multiple Vitamin (DAILY-ISAAC MULTIVITAMIN) TABS TAKE 1 TABLET BY MOUTH EVERY DAY 90 tablet 0    naloxone (NARCAN) 4 MG/0.1ML nasal spray Spray 1 spray (4 mg) into one nostril alternating nostrils once as needed for opioid reversal every 2-3 minutes until assistance arrives 0.2 mL 0    nystatin (MYCOSTATIN) 998504 UNIT/GM  external powder Apply 1 dose topically 3 times daily as needed 60 g 3    omeprazole (PRILOSEC) 20 MG DR capsule TAKE 2 CAPSULES (40 MG) BY MOUTH DAILY *TAKE 30-60 MINUTES BEFORE A MEAL* 180 capsule 2    PARoxetine (PAXIL) 40 MG tablet Take 1 tablet by mouth daily at 2 pm      pioglitazone (ACTOS) 45 MG tablet Take 1 tablet (45 mg) by mouth daily 90 tablet 1    propranolol ER (INDERAL LA) 80 MG 24 hr capsule TAKE 1 CAPSULE BY MOUTH EVERY DAY 90 capsule 3    ramipril (ALTACE) 5 MG capsule TAKE 1 CAPSULE BY MOUTH EVERY DAY 90 capsule 2    rOPINIRole (REQUIP) 0.25 MG tablet TAKE 1 TABLET (0.25 MG) BY MOUTH EVERY EVENING 90 tablet 0    simvastatin (ZOCOR) 20 MG tablet TAKE 1 TABLET BY MOUTH EVERYDAY AT BEDTIME 90 tablet 2    tiZANidine (ZANAFLEX) 2 MG tablet TAKE 1 TABLET BY MOUTH THREE TIMES A DAY AS NEEDED FOR MUSCLE SPASM 270 tablet 0    traZODone (DESYREL) 50 MG tablet Take 75 mg by mouth At Bedtime      vitamin C (CVS VITAMIN C) 500 MG tablet 1/2 TABLET BY MOUTH JACOBSON WITH IRON SUPPLEMENT BEFORE MEAL 45 tablet 3    VITAMIN D3 25 MCG (1000 UT) tablet TAKE 1 TABLET BY MOUTH EVERY DAY 90 tablet 3       Allergies   Allergen Reactions    Morphine Sulfate Itching        Social History     Tobacco Use    Smoking status: Former     Packs/day: 0.00     Types: Cigarettes     Start date: 1974     Quit date: 1975     Years since quittin.1     Passive exposure: Never    Smokeless tobacco: Never   Substance Use Topics    Alcohol use: Yes     Alcohol/week: 0.0 standard drinks of alcohol     Comment: social occasions     Family History   Problem Relation Age of Onset    Diabetes Mother     Cerebrovascular Disease Mother     Diabetes Father     Hypertension Father     Cancer Maternal Grandfather     Cancer Paternal Grandfather     Diabetes Brother     Diabetes Sister     Thyroid Disease Daughter     Thyroid Disease Sister     Multiple Sclerosis Daughter     Glaucoma No family hx of     Macular Degeneration No family hx  "of      History   Drug Use No         Objective     /73 (BP Location: Right arm, Patient Position: Sitting, Cuff Size: Adult Regular)   Pulse 69   Temp 97.9  F (36.6  C) (Oral)   Resp 14   Ht 1.56 m (5' 1.42\")   Wt 87.1 kg (192 lb)   SpO2 100%   BMI 35.79 kg/m      Physical Exam  GENERAL APPEARANCE: healthy, alert and no distress  HENT: ear canals and TM's normal and nose and mouth without ulcers or lesions  RESP: lungs clear to auscultation - no rales, rhonchi or wheezes  CV: regular rate and rhythm, normal S1 S2, no S3 or S4 and no murmur, click or rub   ABDOMEN: soft, nontender, no HSM or masses and bowel sounds normal  MS: extremities normal- no gross deformities noted and right knee with knee brace    Diagnostics:    EKG: appears normal, NSR done on 2023 at [facility].   Echo on 2023    [facility]    [address], [address], [place] [address]    Main: [phone]  [email or web address]                                                  Transthoracic Echo Report    ELI FELDMAN    Yulisabereket ID: [id number] Age: 68 : 1955 Ordering Provider: DOMENICA WALTERS    Exam Date: 2023 07:15 Gender: F Sonographer: JULIO CESAR    Accession #: [id number] Height: 62 in BSA: 1.93 m  BP: 130 / 74    Weight: 205 lbs BMI: 37.5 kg/m  HR: 68          Site: [facility]    Location: Inpatient (Portable) Rhythm: Regular    Procedure Components: 2D imaging with contrast, Color Doppler, Spectral Doppler    Indications: chf, CHF, unspecified or secondary right CHF    Technical Quality: Adequate Contrast: Definity    NDC#: 23812-542-31      Final Conclusion    Visually Estimated EF: 55%    Technically difficult study - contrast was used to enhance endocardial definition due to   suboptimal image quality.    Normal LV size and systolic function with estimated ejection fraction of 55%.    Borderline/mild left ventricular hypertrophy.    Mild to moderate left atrial " enlargement.    No significant valve dysfunction noted.    Normal right ventricular size and function.    Right ventricular systolic pressure estimated at 34 mmHg + RA pressure.     Results for orders placed or performed in visit on 09/15/23   Albumin Random Urine Quantitative with Creat Ratio     Status: Abnormal   Result Value Ref Range    Creatinine Urine mg/dL 56.8 mg/dL    Albumin Urine mg/L 36.5 mg/L    Albumin Urine mg/g Cr 64.26 (H) 0.00 - 25.00 mg/g Cr   Comprehensive metabolic panel     Status: Abnormal   Result Value Ref Range    Sodium 133 (L) 136 - 145 mmol/L    Potassium 5.1 3.4 - 5.3 mmol/L    Chloride 97 (L) 98 - 107 mmol/L    Carbon Dioxide (CO2) 24 22 - 29 mmol/L    Anion Gap 12 7 - 15 mmol/L    Urea Nitrogen 12.1 8.0 - 23.0 mg/dL    Creatinine 0.74 0.51 - 0.95 mg/dL    Calcium 9.4 8.8 - 10.2 mg/dL    Glucose 133 (H) 70 - 99 mg/dL    Alkaline Phosphatase 71 35 - 104 U/L    AST 18 0 - 45 U/L    ALT 13 0 - 50 U/L    Protein Total 6.9 6.4 - 8.3 g/dL    Albumin 4.1 3.5 - 5.2 g/dL    Bilirubin Total 0.2 <=1.2 mg/dL    GFR Estimate 88 >60 mL/min/1.73m2   Hemoglobin A1c     Status: Abnormal   Result Value Ref Range    Hemoglobin A1C 7.7 (H) 0.0 - 5.6 %   CBC with platelets     Status: Abnormal   Result Value Ref Range    WBC Count 5.2 4.0 - 11.0 10e3/uL    RBC Count 3.58 (L) 3.80 - 5.20 10e6/uL    Hemoglobin 9.6 (L) 11.7 - 15.7 g/dL    Hematocrit 29.6 (L) 35.0 - 47.0 %    MCV 83 78 - 100 fL    MCH 26.8 26.5 - 33.0 pg    MCHC 32.4 31.5 - 36.5 g/dL    RDW 15.3 (H) 10.0 - 15.0 %    Platelet Count 356 150 - 450 10e3/uL   FERRITIN     Status: Abnormal   Result Value Ref Range    Ferritin 681 (H) 11 - 328 ng/mL   IRON AND IRON BINDING CAPACITY     Status: Normal   Result Value Ref Range    Iron 63 37 - 145 ug/dL    Iron Binding Capacity 291 240 - 430 ug/dL    Iron Sat Index 22 15 - 46 %   Reticulocyte count     Status: Normal   Result Value Ref Range    % Reticulocyte 1.3 0.5 - 2.0 %    Absolute Reticulocyte  0.049 0.025 - 0.095 10e6/uL      Revised Cardiac Risk Index (RCRI):  The patient has the following serious cardiovascular risks for perioperative complications:   - Diabetes Mellitus (on Insulin) = 1 point     RCRI Interpretation: 1 point: Class II (low risk - 0.9% complication rate)        I spent a total of 54 minutes on the day of the visit.   doing chart review, history and exam, documentation and further activities as noted above     Signed Electronically by: Tiffany King MD PhD  Copy of this evaluation report is provided to requesting physician.

## 2023-09-15 NOTE — PATIENT INSTRUCTIONS
How to Take Your Medication Before Surgery  - Take all of your medications before surgery except as noted below  - HOLD (do not take) your METFORMIN, Glimepiride, and Pioglitazone on the morning of surgery.  - HOLD (do not take) Aspirin for 10 days  - HOLD (do not take) Eliquis for 2 days  - STOP taking all vitamins and herbal supplements 14 days before surgery.  - Reduce Lantus to 24 units the evening before the surgery    For informational purposes only. Not to replace the advice of your health care provider. Copyright   2003,  [facility]. All rights reserved. Clinically reviewed by Angie Argueta MD. [employer] [id number] - REV .  Preparing for Your Surgery  Getting started  A nurse will call you to review your health history and instructions. They will give you an arrival time based on your scheduled surgery time. Please be ready to share:  Your doctor's clinic name and phone number  Your medical, surgical, and anesthesia history  A list of allergies and sensitivities  A list of medicines, including herbal treatments and over-the-counter drugs  Whether the patient has a legal guardian (ask how to send us the papers in advance)  Please tell us if you're pregnant--or if there's any chance you might be pregnant. Some surgeries may injure a fetus (unborn baby), so they require a pregnancy test. Surgeries that are safe for a fetus don't always need a test, and you can choose whether to have one.   If you have a child who's having surgery, please ask for a copy of Preparing for Your Child's Surgery.    Preparing for surgery  Within 10 to 30 days of surgery: Have a pre-op exam (sometimes called an H&P, or History and Physical). This can be done at a clinic or pre-operative center.  If you're having a , you may not need this exam. Talk to your care team.  At your pre-op exam, talk to your care team about all medicines you take. If you need to stop any medicines before surgery, ask when to start  taking them again.  We do this for your safety. Many medicines can make you bleed too much during surgery. Some change how well surgery (anesthesia) drugs work.  Call your insurance company to let them know you're having surgery. (If you don't have insurance, call [phone].)  Call your clinic if there's any change in your health. This includes signs of a cold or flu (sore throat, runny nose, cough, rash, fever). It also includes a scrape or scratch near the surgery site.  If you have questions on the day of surgery, call your hospital or surgery center.  Eating and drinking guidelines  For your safety: Unless your surgeon tells you otherwise, follow the guidelines below.  Eat and drink as usual until 8 hours before you arrive for surgery. After that, no food or milk.  Drink clear liquids until 2 hours before you arrive. These are liquids you can see through, like water, Gatorade, and Propel Water. They also include plain black coffee and tea (no cream or milk), candy, and breath mints. You can spit out gum when you arrive.  If you drink alcohol: Stop drinking it the night before surgery.  If your care team tells you to take medicine on the morning of surgery, it's okay to take it with a sip of water.  Preventing infection  Shower or bathe the night before and morning of your surgery. Follow the instructions your clinic gave you. (If no instructions, use regular soap.)  Don't shave or clip hair near your surgery site. We'll remove the hair if needed.  Don't smoke or vape the morning of surgery. You may chew nicotine gum up to 2 hours before surgery. A nicotine patch is okay.  Note: Some surgeries require you to completely quit smoking and nicotine. Check with your surgeon.  Your care team will make every effort to keep you safe from infection. We will:  Clean our hands often with soap and water (or an alcohol-based hand rub).  Clean the skin at your surgery site with a special soap that kills germs.  Give you a  special gown to keep you warm. (Cold raises the risk of infection.)  Wear special hair covers, masks, gowns and gloves during surgery.  Give antibiotic medicine, if prescribed. Not all surgeries need antibiotics.  What to bring on the day of surgery  Photo ID and insurance card  Copy of your health care directive, if you have one  Glasses and hearing aids (bring cases)  You can't wear contacts during surgery  Inhaler and eye drops, if you use them (tell us about these when you arrive)  CPAP machine or breathing device, if you use them  A few personal items, if spending the night  If you have . . .  A pacemaker, ICD (cardiac defibrillator) or other implant: Bring the ID card.  An implanted stimulator: Bring the remote control.  A legal guardian: Bring a copy of the certified (court-stamped) guardianship papers.  Please remove any jewelry, including body piercings. Leave jewelry and other valuables at home.  If you're going home the day of surgery  You must have a responsible adult drive you home. They should stay with you overnight as well.  If you don't have someone to stay with you, and you aren't safe to go home alone, we may keep you overnight. Insurance often won't pay for this.  After surgery  If it's hard to control your pain or you need more pain medicine, please call your surgeon's office.  Questions?   If you have any questions for your care team, list them here: _________________________________________________________________________________________________________________________________________________________________________ ____________________________________ ____________________________________ ____________________________________

## 2023-09-18 DIAGNOSIS — F11.90 OPIOID USE DISORDER: ICD-10-CM

## 2023-09-18 DIAGNOSIS — G89.29 CHRONIC PAIN OF RIGHT KNEE: ICD-10-CM

## 2023-09-18 DIAGNOSIS — M17.11 PRIMARY OSTEOARTHRITIS OF RIGHT KNEE: ICD-10-CM

## 2023-09-18 DIAGNOSIS — M25.561 CHRONIC PAIN OF RIGHT KNEE: ICD-10-CM

## 2023-09-18 NOTE — TELEPHONE ENCOUNTER
Medication Question or Refill    Contacts         Type Contact Phone/Fax    09/18/2023 10:34 AM CDT Phone (Incoming) Holly Muir (Self) [phone] (M)            What medication are you calling about (include dose and sig)?: Oxycodone and diclofenac (VOLTAREN) 1 % topical gel     Preferred Pharmacy:   [id number] - [place], [place] - [address]  [address]  [facility] [address]  Phone: [phone] Fax: [phone]    [facility] - [place], [place] - [address]  [address] [facility] [address]  Phone: [phone] Fax: [phone]    [facility] - [place], [place] - [address] [place] [address]  Phone: [phone] Fax: [phone]      Controlled Substance Agreement on file:   CSA -- Patient Level:     [Media Unavailable] Controlled Substance Agreement - Opioid - Scan on 4/27/2023  2:59 PM   [Media Unavailable] Controlled Substance Agreement - Opioid - Scan on 4/15/2021  9:53 AM       Who prescribed the medication?: PCP    Do you need a refill? Yes    When did you use the medication last? unk    Patient offered an appointment? No    Do you have any questions or concerns?  Yes: Pt will be out of her oxycodone as of today. Please send in ASAP.       Could we send this information to you in [employer] or would you prefer to receive a phone call?:   Patient would prefer a phone call   Okay to leave a detailed message?: Yes at Cell number on file:    Telephone Information:   Mobile [phone]

## 2023-09-19 DIAGNOSIS — S92.352A CLOSED FRACTURE OF FIFTH METATARSAL BONE OF LEFT FOOT, PHYSEAL INVOLVEMENT UNSPECIFIED, INITIAL ENCOUNTER: ICD-10-CM

## 2023-09-19 DIAGNOSIS — M25.562 ARTHRALGIA OF BOTH KNEES: ICD-10-CM

## 2023-09-19 DIAGNOSIS — M25.561 CHRONIC PAIN OF RIGHT KNEE: ICD-10-CM

## 2023-09-19 DIAGNOSIS — G89.29 CHRONIC PAIN OF RIGHT KNEE: ICD-10-CM

## 2023-09-19 DIAGNOSIS — M25.561 ARTHRALGIA OF BOTH KNEES: ICD-10-CM

## 2023-09-19 DIAGNOSIS — G25.81 RESTLESS LEG SYNDROME: ICD-10-CM

## 2023-09-19 DIAGNOSIS — M17.11 PRIMARY OSTEOARTHRITIS OF RIGHT KNEE: ICD-10-CM

## 2023-09-19 NOTE — TELEPHONE ENCOUNTER
Pt calling to follow up on this refill. She is asking it be filled now. She is having a lot of pain.  Felicia Acuna BSN, RN

## 2023-09-20 ENCOUNTER — DOCUMENTATION ONLY (OUTPATIENT)
Dept: OTHER | Facility: CLINIC | Age: 68
End: 2023-09-20
Payer: COMMERCIAL

## 2023-09-20 ENCOUNTER — TELEPHONE (OUTPATIENT)
Dept: HEMATOLOGY | Facility: CLINIC | Age: 68
End: 2023-09-20
Payer: COMMERCIAL

## 2023-09-20 DIAGNOSIS — Z90.49 S/P PARTIAL COLECTOMY: ICD-10-CM

## 2023-09-20 DIAGNOSIS — D64.9 ANEMIA, UNSPECIFIED TYPE: ICD-10-CM

## 2023-09-20 DIAGNOSIS — Z93.3 S/P COLOSTOMY (H): ICD-10-CM

## 2023-09-20 DIAGNOSIS — Z86.711 HISTORY OF PULMONARY EMBOLISM: ICD-10-CM

## 2023-09-20 DIAGNOSIS — G25.81 RESTLESS LEG SYNDROME: ICD-10-CM

## 2023-09-20 DIAGNOSIS — D50.8 IRON DEFICIENCY ANEMIA SECONDARY TO INADEQUATE DIETARY IRON INTAKE: Primary | ICD-10-CM

## 2023-09-20 RX ORDER — TIZANIDINE 2 MG/1
TABLET ORAL
Qty: 270 TABLET | Refills: 0 | Status: ON HOLD | OUTPATIENT
Start: 2023-09-20 | End: 2023-10-03

## 2023-09-20 RX ORDER — ROPINIROLE 0.25 MG/1
0.25 TABLET, FILM COATED ORAL EVERY EVENING
Qty: 90 TABLET | Refills: 0 | OUTPATIENT
Start: 2023-09-20

## 2023-09-20 RX ORDER — GABAPENTIN 600 MG/1
TABLET ORAL
Qty: 180 TABLET | Refills: 5 | Status: SHIPPED | OUTPATIENT
Start: 2023-09-20 | End: 2024-05-02

## 2023-09-20 RX ORDER — OXYCODONE HYDROCHLORIDE 5 MG/1
5 TABLET ORAL EVERY 6 HOURS PRN
Qty: 28 TABLET | Refills: 0 | Status: ON HOLD | OUTPATIENT
Start: 2023-09-20 | End: 2023-10-03

## 2023-09-20 NOTE — TELEPHONE ENCOUNTER
[facility]  [facility] [address], [address], [place] [address]  Main: [phone], Fax: [phone]    Telephone Note:    Patient: Holly Muir  MRN: [id number]  : 1955  Date of this note written: 2023    This writer contacted the patient via phone on 2023 at 09:08am to communicate the result of her iron panel and CBC that was done on 9/15/2023 to her.     I confirmed with her that she had received her IV iron (Infed) infusion on 2023. My original plan was to have her get repeat labs one month after her IV iron infusion but unfortunately, she had these labs done only 7 days after her IV iron infusion.     I explain to the patient that these labs were done too soon and I am not able to interpret them at this time (though her ferritin is elevated as expected, her hemoglobin is stable, her iron level is now at low normal range).     I have instructed Holly to get these labs recheck on or after 10/8/2023. I will have one of our nursing staffs contact around that time to give her a reminder.     Component      Latest Ref Rng 9/15/2023  11:43 AM   WBC      4.0 - 11.0 10e3/uL 5.2    RBC Count      3.80 - 5.20 10e6/uL 3.58 (L)    Hemoglobin      11.7 - 15.7 g/dL 9.6 (L)    Hematocrit      35.0 - 47.0 % 29.6 (L)    MCV      78 - 100 fL 83    MCH      26.5 - 33.0 pg 26.8    MCHC      31.5 - 36.5 g/dL 32.4    RDW      10.0 - 15.0 % 15.3 (H)    Platelet Count      150 - 450 10e3/uL 356    Iron      37 - 145 ug/dL 63    Iron Binding Capacity      240 - 430 ug/dL 291    Iron Sat Index      15 - 46 % 22    % Retic      0.5 - 2.0 % 1.3    Absolute Retic      0.025 - 0.095 10e6/uL 0.049    Ferritin      11 - 328 ng/mL 681 (H)         Carter Lan PA-C, MPAS  Physician Assistant  [facility].

## 2023-09-22 DIAGNOSIS — D50.9 IRON DEFICIENCY ANEMIA, UNSPECIFIED IRON DEFICIENCY ANEMIA TYPE: ICD-10-CM

## 2023-09-22 RX ORDER — ASCORBIC ACID 500 MG
TABLET ORAL
Qty: 45 TABLET | Refills: 3 | Status: SHIPPED | OUTPATIENT
Start: 2023-09-22

## 2023-09-22 RX ORDER — FERROUS GLUCONATE 324(38)MG
324 TABLET ORAL
Qty: 90 TABLET | Refills: 3 | Status: SHIPPED | OUTPATIENT
Start: 2023-09-22

## 2023-09-27 NOTE — PROGRESS NOTES
"  Ortho Navigator Note      Pre-op Date 9/15/23     Medical Clearance  Cleared     Labs WNR     COVID Test Date No longer indicated      Skin  Intact      Activity: Ambulates independently with FW walker. Requires help with washing her back and feet as well as dressing. Family runs errands for her as well.      Equipment Need Patient will bring a walker for discharge. Defer to PT/OT for recs.  Colostomy appliance in place. Patient was instructed to bring the supplies that she uses for reference.      Meds to Hold Held all supplements 14 days prior to surgery  - Take all of your medications before surgery except as noted below  - HOLD (do not take) your METFORMIN, Glimepiride, and Pioglitazone on the morning of surgery.  - HOLD (do not take) Aspirin for 10 days  - HOLD (do not take) Eliquis for 2 days  - STOP taking all vitamins and herbal supplements 14 days before surgery.  - Reduce Lantus to 24 units the evening before the surgery  * Medication recommendations are not intended to be exhaustive; they are limited to common medications that are potentially dangerous if incorrectly managed   NPO Instructions  Instructed to stop solids 8 hr prior to arrival and clears 2 hr prior to arrival.       Pre-op Joint Education Complete? Email link sent to patient.    Discharge Plan Patient has plan to discharge home on morning of POD 1.   Daughter Cata will arrive at hospital at [id number] to participate in therapy and discharge education. They will then transport patient home    /Transportation Cata and Mirna will be coaches.  is physically able to care for patient.      Barriers to Discharge No barriers to discharge.      Additional Info/   Special Needs : Colostomy bag in place. Skin irritation and \"slit\" near appliance. Advised her to keep clean and dry leading up to surgery. . Iron infused on 9/8. Hgb 9.6 (baseline)on 9/15. Dr Sheehan's team notified.           09/27/23 7190   Discharge Planning "   Patient/Family Anticipates Transition to home with family   Concerns to be Addressed no discharge needs identified   Living Arrangements   People in Home child(ervin), adult   Type of Residence Private Residence   Is your private residence a single family home or apartment? Single family home   Number of Stairs, Within Home, Primary seven   Stair Railings, Within Home, Primary railings safe and in good condition   Once home, are you able to live on one level? Yes   Which level? Upper Level   Bathroom Shower/Tub Tub/Shower unit   Equipment Currently Used at Home walker, standard;shower chair   Support System   Support Systems Children   Do you have someone available to stay with you one or two nights once you are home? Yes   Medical Clearance   Date of Physical 09/15/23   Clinic Name [facility]   It is recommended that you call and check with any specialty providers before surgery to see if you need surgical clearance.  Do you see any specialty providers outside of your primary care provider? No   Blood   Known Bleeding Disorder or Coagulopathy No   Does the patient have any Congregational/cultural preferences related to blood products? No   Education   Has the patient scheduled or completed pre-op total joint education, either in class or online, in the last 12 months? Yes   Patient attended total joint pre-op class/received pre-op teaching  online   Relationship/Living Environment   Name(s) of People in Home Deed

## 2023-09-29 ENCOUNTER — ANESTHESIA EVENT (OUTPATIENT)
Dept: SURGERY | Facility: CLINIC | Age: 68
End: 2023-09-29
Payer: COMMERCIAL

## 2023-09-29 ENCOUNTER — THERAPY VISIT (OUTPATIENT)
Dept: PHYSICAL THERAPY | Facility: CLINIC | Age: 68
End: 2023-09-29
Payer: COMMERCIAL

## 2023-09-29 DIAGNOSIS — M17.11 PRIMARY LOCALIZED OSTEOARTHRITIS OF RIGHT KNEE: Primary | ICD-10-CM

## 2023-09-29 PROCEDURE — 97140 MANUAL THERAPY 1/> REGIONS: CPT | Mod: GP | Performed by: PHYSICAL THERAPIST

## 2023-09-29 PROCEDURE — 97110 THERAPEUTIC EXERCISES: CPT | Mod: GP | Performed by: PHYSICAL THERAPIST

## 2023-09-29 NOTE — PROGRESS NOTES
"    DISCHARGE  Reason for Discharge: No further expectation of progress.    Equipment Issued:     Discharge Plan: Patient to continue home program.   09/29/23 0500   Appointment Info   Signing clinician's name / credentials Dwayne Nuno DPT   Total/Authorized Visits 8 (E&T)   Visits Used 3   Medical Diagnosis Primary localized osteoarthritis of right knee   PT Tx Diagnosis R knee OA   Quick Adds Certification   Progress Note/Certification   Start of Care Date 09/01/23   Onset of illness/injury or Date of Surgery 11/01/22   Therapy Frequency 1x/wk   Predicted Duration 8 wks   Certification date from 09/01/23   Certification date to 10/27/23   Progress Note Completed Date 09/01/23   PT Goal 1   Goal Identifier sleep   Goal Description Pt will be able to sleep through the night without pain   Rationale to maximize safety and independence with performance of ADLs and functional tasks;to maximize safety and independence with self cares   Goal Progress 2-3 hours of sleep, no change 9/29   Target Date 10/27/23   Subjective Report   Subjective Report Knee pretty sore today. Replacement scheduled on Monday (in three days).   Objective Measures   Objective Measures Objective Measure 1   Objective Measure 1   Details fair R quad set   Treatment Interventions (PT)   Interventions Therapeutic Procedure/Exercise;Therapeutic Activity;Neuromuscular Re-education;Manual Therapy   Therapeutic Procedure/Exercise   Therapeutic Procedures: strength, endurance, ROM, flexibillity minutes (53498) 18   Ther Proc 1 Isometric Quad   Ther Proc 1 - Details 15 x 5\"   PTRx Ther Proc 1 Heel Slides   PTRx Ther Proc 1 - Details x15 x 5\"   PTRx Ther Proc 2 Isometric Quad   PTRx Ther Proc 2 - Details 15 x 5\"   PTRx Ther Proc 3 Towel Stretch Gastroc   PTRx Ther Proc 3 - Details 3 x 30\"   PTRx Ther Proc 4 Short Arc Knee Extension   PTRx Ther Proc 4 - Details AG x 25   Therapeutic Activity   PTRx Ther Act 1 Icing   PTRx Ther Act 1 - Details HEP "   Manual Therapy   Manual Therapy: Mobilization, MFR, MLD, friction massage minutes (36783) 10   Manual Therapy 1 sup-inf patellar mobs seated with PT assist passive knee flexion/ext   Manual Therapy 1 - Details Gr II-III x 10'   Plan   Home program see PTRx   Plan for next session d/c to HEP, surgery scheduled for 10/2   Total Session Time   Timed Code Treatment Minutes 28   Total Treatment Time (sum of timed and untimed services) 28         Referring Provider:  Sanford Sheehan

## 2023-10-02 ENCOUNTER — APPOINTMENT (OUTPATIENT)
Dept: GENERAL RADIOLOGY | Facility: CLINIC | Age: 68
End: 2023-10-02
Payer: COMMERCIAL

## 2023-10-02 ENCOUNTER — ANESTHESIA (OUTPATIENT)
Dept: SURGERY | Facility: CLINIC | Age: 68
End: 2023-10-02
Payer: COMMERCIAL

## 2023-10-02 ENCOUNTER — HOSPITAL ENCOUNTER (OUTPATIENT)
Facility: CLINIC | Age: 68
Discharge: HOME | End: 2023-10-03
Attending: ORTHOPAEDIC SURGERY | Admitting: ORTHOPAEDIC SURGERY
Payer: COMMERCIAL

## 2023-10-02 DIAGNOSIS — G89.29 CHRONIC PAIN OF RIGHT KNEE: ICD-10-CM

## 2023-10-02 DIAGNOSIS — M17.11 PRIMARY OSTEOARTHRITIS OF RIGHT KNEE: Primary | ICD-10-CM

## 2023-10-02 DIAGNOSIS — Z96.651 STATUS POST TOTAL RIGHT KNEE REPLACEMENT: ICD-10-CM

## 2023-10-02 DIAGNOSIS — F11.90 OPIOID USE DISORDER: ICD-10-CM

## 2023-10-02 DIAGNOSIS — M25.561 CHRONIC PAIN OF RIGHT KNEE: ICD-10-CM

## 2023-10-02 LAB
GLUCOSE BLDC GLUCOMTR-MCNC: 180 MG/DL (ref 70–99)
GLUCOSE BLDC GLUCOMTR-MCNC: 182 MG/DL (ref 70–99)
GLUCOSE BLDC GLUCOMTR-MCNC: 190 MG/DL (ref 70–99)
GLUCOSE BLDC GLUCOMTR-MCNC: 253 MG/DL (ref 70–99)
GLUCOSE BLDC GLUCOMTR-MCNC: 332 MG/DL (ref 70–99)
LACTATE SERPL-SCNC: 1.3 MMOL/L (ref 0.7–2)

## 2023-10-02 PROCEDURE — 250N000012 HC RX MED GY IP 250 OP 636 PS 637

## 2023-10-02 PROCEDURE — 999N000141 HC STATISTIC PRE-PROCEDURE NURSING ASSESSMENT: Performed by: ORTHOPAEDIC SURGERY

## 2023-10-02 PROCEDURE — 272N000001 HC OR GENERAL SUPPLY STERILE: Performed by: ORTHOPAEDIC SURGERY

## 2023-10-02 PROCEDURE — 370N000017 HC ANESTHESIA TECHNICAL FEE, PER MIN: Performed by: ORTHOPAEDIC SURGERY

## 2023-10-02 PROCEDURE — 83605 ASSAY OF LACTIC ACID: CPT

## 2023-10-02 PROCEDURE — 250N000013 HC RX MED GY IP 250 OP 250 PS 637

## 2023-10-02 PROCEDURE — 250N000011 HC RX IP 250 OP 636

## 2023-10-02 PROCEDURE — 250N000013 HC RX MED GY IP 250 OP 250 PS 637: Performed by: ANESTHESIOLOGY

## 2023-10-02 PROCEDURE — C1776 JOINT DEVICE (IMPLANTABLE): HCPCS | Performed by: ORTHOPAEDIC SURGERY

## 2023-10-02 PROCEDURE — 250N000011 HC RX IP 250 OP 636: Mod: JZ

## 2023-10-02 PROCEDURE — 258N000003 HC RX IP 258 OP 636: Performed by: ANESTHESIOLOGY

## 2023-10-02 PROCEDURE — 360N000077 HC SURGERY LEVEL 4, PER MIN: Performed by: ORTHOPAEDIC SURGERY

## 2023-10-02 PROCEDURE — 250N000011 HC RX IP 250 OP 636: Mod: JZ | Performed by: ORTHOPAEDIC SURGERY

## 2023-10-02 PROCEDURE — 710N000010 HC RECOVERY PHASE 1, LEVEL 2, PER MIN: Performed by: ORTHOPAEDIC SURGERY

## 2023-10-02 PROCEDURE — 999N000065 XR KNEE PORT RIGHT 1/2 VIEWS: Mod: RT

## 2023-10-02 PROCEDURE — 250N000011 HC RX IP 250 OP 636: Mod: JZ | Performed by: NURSE ANESTHETIST, CERTIFIED REGISTERED

## 2023-10-02 PROCEDURE — C1713 ANCHOR/SCREW BN/BN,TIS/BN: HCPCS | Performed by: ORTHOPAEDIC SURGERY

## 2023-10-02 PROCEDURE — 250N000012 HC RX MED GY IP 250 OP 636 PS 637: Performed by: ANESTHESIOLOGY

## 2023-10-02 PROCEDURE — 250N000011 HC RX IP 250 OP 636: Mod: JZ | Performed by: ANESTHESIOLOGY

## 2023-10-02 PROCEDURE — 258N000003 HC RX IP 258 OP 636: Performed by: NURSE ANESTHETIST, CERTIFIED REGISTERED

## 2023-10-02 PROCEDURE — 36415 COLL VENOUS BLD VENIPUNCTURE: CPT

## 2023-10-02 PROCEDURE — 258N000003 HC RX IP 258 OP 636: Performed by: ORTHOPAEDIC SURGERY

## 2023-10-02 PROCEDURE — 99204 OFFICE O/P NEW MOD 45 MIN: CPT

## 2023-10-02 PROCEDURE — 250N000009 HC RX 250: Performed by: ANESTHESIOLOGY

## 2023-10-02 PROCEDURE — 258N000001 HC RX 258: Performed by: ORTHOPAEDIC SURGERY

## 2023-10-02 PROCEDURE — 82962 GLUCOSE BLOOD TEST: CPT

## 2023-10-02 DEVICE — GENESIS II NON-POROUS TIBIAL                                    BASEPLATE SIZE 2 RIGHT
Type: IMPLANTABLE DEVICE | Site: KNEE | Status: FUNCTIONAL
Brand: GENESIS II

## 2023-10-02 DEVICE — BONE CEMENT SIMPLEX W/TOBRAMYCIN 6197-9-001: Type: IMPLANTABLE DEVICE | Site: KNEE | Status: FUNCTIONAL

## 2023-10-02 DEVICE — GENESIS II NON POROUS C/R FEMORAL                                    SIZE 3 RIGHT
Type: IMPLANTABLE DEVICE | Site: KNEE | Status: FUNCTIONAL
Brand: GENESIS II

## 2023-10-02 DEVICE — LEGION HIGHLY CROSS LINKED                                    POLYETHYLENE DISHED INSERT SIZE 1-2 9MM
Type: IMPLANTABLE DEVICE | Site: KNEE | Status: FUNCTIONAL
Brand: LEGION

## 2023-10-02 RX ORDER — FERROUS GLUCONATE 324(38)MG
324 TABLET ORAL
Status: DISCONTINUED | OUTPATIENT
Start: 2023-10-03 | End: 2023-10-03 | Stop reason: HOSPADM

## 2023-10-02 RX ORDER — HYDROMORPHONE HYDROCHLORIDE 1 MG/ML
0.4 INJECTION, SOLUTION INTRAMUSCULAR; INTRAVENOUS; SUBCUTANEOUS EVERY 5 MIN PRN
Status: DISCONTINUED | OUTPATIENT
Start: 2023-10-02 | End: 2023-10-02 | Stop reason: HOSPADM

## 2023-10-02 RX ORDER — TRANEXAMIC ACID 650 MG/1
1950 TABLET ORAL ONCE
Status: COMPLETED | OUTPATIENT
Start: 2023-10-02 | End: 2023-10-02

## 2023-10-02 RX ORDER — OXYCODONE HYDROCHLORIDE 5 MG/1
5 TABLET ORAL EVERY 4 HOURS PRN
Status: DISCONTINUED | OUTPATIENT
Start: 2023-10-02 | End: 2023-10-03 | Stop reason: HOSPADM

## 2023-10-02 RX ORDER — OXYCODONE HYDROCHLORIDE 5 MG/1
5 TABLET ORAL
Status: DISCONTINUED | OUTPATIENT
Start: 2023-10-02 | End: 2023-10-02 | Stop reason: HOSPADM

## 2023-10-02 RX ORDER — LIDOCAINE 40 MG/G
CREAM TOPICAL
Status: DISCONTINUED | OUTPATIENT
Start: 2023-10-02 | End: 2023-10-03 | Stop reason: HOSPADM

## 2023-10-02 RX ORDER — NALOXONE HYDROCHLORIDE 0.4 MG/ML
0.2 INJECTION, SOLUTION INTRAMUSCULAR; INTRAVENOUS; SUBCUTANEOUS
Status: DISCONTINUED | OUTPATIENT
Start: 2023-10-02 | End: 2023-10-02 | Stop reason: HOSPADM

## 2023-10-02 RX ORDER — BISACODYL 10 MG
10 SUPPOSITORY, RECTAL RECTAL DAILY PRN
Status: DISCONTINUED | OUTPATIENT
Start: 2023-10-02 | End: 2023-10-03 | Stop reason: HOSPADM

## 2023-10-02 RX ORDER — ACETAMINOPHEN 325 MG/1
975 TABLET ORAL EVERY 8 HOURS
Status: DISCONTINUED | OUTPATIENT
Start: 2023-10-02 | End: 2023-10-03 | Stop reason: HOSPADM

## 2023-10-02 RX ORDER — BUSPIRONE HYDROCHLORIDE 15 MG/1
30 TABLET ORAL 2 TIMES DAILY
Status: DISCONTINUED | OUTPATIENT
Start: 2023-10-02 | End: 2023-10-03 | Stop reason: HOSPADM

## 2023-10-02 RX ORDER — FENTANYL CITRATE 50 UG/ML
25 INJECTION, SOLUTION INTRAMUSCULAR; INTRAVENOUS EVERY 5 MIN PRN
Status: DISCONTINUED | OUTPATIENT
Start: 2023-10-02 | End: 2023-10-02 | Stop reason: HOSPADM

## 2023-10-02 RX ORDER — PROPOFOL 10 MG/ML
INJECTION, EMULSION INTRAVENOUS PRN
Status: DISCONTINUED | OUTPATIENT
Start: 2023-10-02 | End: 2023-10-02

## 2023-10-02 RX ORDER — NALOXONE HYDROCHLORIDE 0.4 MG/ML
0.4 INJECTION, SOLUTION INTRAMUSCULAR; INTRAVENOUS; SUBCUTANEOUS
Status: DISCONTINUED | OUTPATIENT
Start: 2023-10-02 | End: 2023-10-02 | Stop reason: HOSPADM

## 2023-10-02 RX ORDER — ACETAMINOPHEN 325 MG/1
650 TABLET ORAL EVERY 4 HOURS PRN
Status: DISCONTINUED | OUTPATIENT
Start: 2023-10-05 | End: 2023-10-03 | Stop reason: HOSPADM

## 2023-10-02 RX ORDER — NALOXONE HYDROCHLORIDE 0.4 MG/ML
0.2 INJECTION, SOLUTION INTRAMUSCULAR; INTRAVENOUS; SUBCUTANEOUS
Status: DISCONTINUED | OUTPATIENT
Start: 2023-10-02 | End: 2023-10-03 | Stop reason: HOSPADM

## 2023-10-02 RX ORDER — BUPIVACAINE HYDROCHLORIDE 7.5 MG/ML
INJECTION, SOLUTION INTRASPINAL
Status: COMPLETED | OUTPATIENT
Start: 2023-10-02 | End: 2023-10-02

## 2023-10-02 RX ORDER — BUPIVACAINE HYDROCHLORIDE 2.5 MG/ML
INJECTION, SOLUTION EPIDURAL; INFILTRATION; INTRACAUDAL
Status: COMPLETED | OUTPATIENT
Start: 2023-10-02 | End: 2023-10-02

## 2023-10-02 RX ORDER — BUPROPION HYDROCHLORIDE 100 MG/1
200 TABLET, EXTENDED RELEASE ORAL EVERY EVENING
Status: DISCONTINUED | OUTPATIENT
Start: 2023-10-02 | End: 2023-10-03 | Stop reason: HOSPADM

## 2023-10-02 RX ORDER — KETOROLAC TROMETHAMINE 15 MG/ML
15 INJECTION, SOLUTION INTRAMUSCULAR; INTRAVENOUS
Status: DISCONTINUED | OUTPATIENT
Start: 2023-10-02 | End: 2023-10-02 | Stop reason: HOSPADM

## 2023-10-02 RX ORDER — DEXAMETHASONE SODIUM PHOSPHATE 10 MG/ML
INJECTION, SOLUTION INTRAMUSCULAR; INTRAVENOUS
Status: COMPLETED | OUTPATIENT
Start: 2023-10-02 | End: 2023-10-02

## 2023-10-02 RX ORDER — DEXMEDETOMIDINE HYDROCHLORIDE 4 UG/ML
INJECTION, SOLUTION INTRAVENOUS
Status: COMPLETED | OUTPATIENT
Start: 2023-10-02 | End: 2023-10-02

## 2023-10-02 RX ORDER — CETIRIZINE HYDROCHLORIDE 10 MG/1
10 TABLET ORAL DAILY
COMMUNITY
End: 2023-11-27

## 2023-10-02 RX ORDER — OXYCODONE HYDROCHLORIDE 10 MG/1
10 TABLET ORAL
Status: DISCONTINUED | OUTPATIENT
Start: 2023-10-02 | End: 2023-10-02 | Stop reason: HOSPADM

## 2023-10-02 RX ORDER — ONDANSETRON 4 MG/1
4 TABLET, ORALLY DISINTEGRATING ORAL EVERY 6 HOURS PRN
Status: DISCONTINUED | OUTPATIENT
Start: 2023-10-02 | End: 2023-10-03 | Stop reason: HOSPADM

## 2023-10-02 RX ORDER — HYDROMORPHONE HYDROCHLORIDE 1 MG/ML
0.2 INJECTION, SOLUTION INTRAMUSCULAR; INTRAVENOUS; SUBCUTANEOUS
Status: DISCONTINUED | OUTPATIENT
Start: 2023-10-02 | End: 2023-10-03 | Stop reason: HOSPADM

## 2023-10-02 RX ORDER — ONDANSETRON 2 MG/ML
4 INJECTION INTRAMUSCULAR; INTRAVENOUS EVERY 30 MIN PRN
Status: DISCONTINUED | OUTPATIENT
Start: 2023-10-02 | End: 2023-10-02 | Stop reason: HOSPADM

## 2023-10-02 RX ORDER — SODIUM CHLORIDE, SODIUM LACTATE, POTASSIUM CHLORIDE, CALCIUM CHLORIDE 600; 310; 30; 20 MG/100ML; MG/100ML; MG/100ML; MG/100ML
INJECTION, SOLUTION INTRAVENOUS CONTINUOUS PRN
Status: DISCONTINUED | OUTPATIENT
Start: 2023-10-02 | End: 2023-10-02

## 2023-10-02 RX ORDER — SODIUM CHLORIDE, SODIUM LACTATE, POTASSIUM CHLORIDE, CALCIUM CHLORIDE 600; 310; 30; 20 MG/100ML; MG/100ML; MG/100ML; MG/100ML
INJECTION, SOLUTION INTRAVENOUS CONTINUOUS
Status: DISCONTINUED | OUTPATIENT
Start: 2023-10-02 | End: 2023-10-03 | Stop reason: HOSPADM

## 2023-10-02 RX ORDER — ROPINIROLE 0.25 MG/1
0.25 TABLET, FILM COATED ORAL EVERY EVENING
Status: DISCONTINUED | OUTPATIENT
Start: 2023-10-02 | End: 2023-10-03 | Stop reason: HOSPADM

## 2023-10-02 RX ORDER — SODIUM CHLORIDE, SODIUM LACTATE, POTASSIUM CHLORIDE, CALCIUM CHLORIDE 600; 310; 30; 20 MG/100ML; MG/100ML; MG/100ML; MG/100ML
INJECTION, SOLUTION INTRAVENOUS CONTINUOUS
Status: DISCONTINUED | OUTPATIENT
Start: 2023-10-02 | End: 2023-10-02 | Stop reason: HOSPADM

## 2023-10-02 RX ORDER — NALOXONE HYDROCHLORIDE 0.4 MG/ML
0.4 INJECTION, SOLUTION INTRAMUSCULAR; INTRAVENOUS; SUBCUTANEOUS
Status: DISCONTINUED | OUTPATIENT
Start: 2023-10-02 | End: 2023-10-03 | Stop reason: HOSPADM

## 2023-10-02 RX ORDER — DEXTROSE MONOHYDRATE 25 G/50ML
25-50 INJECTION, SOLUTION INTRAVENOUS
Status: DISCONTINUED | OUTPATIENT
Start: 2023-10-02 | End: 2023-10-03 | Stop reason: HOSPADM

## 2023-10-02 RX ORDER — ONDANSETRON 2 MG/ML
4 INJECTION INTRAMUSCULAR; INTRAVENOUS EVERY 6 HOURS PRN
Status: DISCONTINUED | OUTPATIENT
Start: 2023-10-02 | End: 2023-10-03 | Stop reason: HOSPADM

## 2023-10-02 RX ORDER — OXYCODONE HYDROCHLORIDE 10 MG/1
10 TABLET ORAL EVERY 4 HOURS PRN
Status: DISCONTINUED | OUTPATIENT
Start: 2023-10-02 | End: 2023-10-03 | Stop reason: HOSPADM

## 2023-10-02 RX ORDER — CEFAZOLIN SODIUM/WATER 2 G/20 ML
2 SYRINGE (ML) INTRAVENOUS SEE ADMIN INSTRUCTIONS
Status: DISCONTINUED | OUTPATIENT
Start: 2023-10-02 | End: 2023-10-02 | Stop reason: HOSPADM

## 2023-10-02 RX ORDER — ONDANSETRON 4 MG/1
4 TABLET, ORALLY DISINTEGRATING ORAL EVERY 30 MIN PRN
Status: DISCONTINUED | OUTPATIENT
Start: 2023-10-02 | End: 2023-10-02 | Stop reason: HOSPADM

## 2023-10-02 RX ORDER — ACETAMINOPHEN 325 MG/1
975 TABLET ORAL ONCE
Status: COMPLETED | OUTPATIENT
Start: 2023-10-02 | End: 2023-10-02

## 2023-10-02 RX ORDER — SIMVASTATIN 20 MG
20 TABLET ORAL AT BEDTIME
Status: DISCONTINUED | OUTPATIENT
Start: 2023-10-02 | End: 2023-10-03 | Stop reason: HOSPADM

## 2023-10-02 RX ORDER — PROPRANOLOL HYDROCHLORIDE 80 MG/1
80 CAPSULE, EXTENDED RELEASE ORAL DAILY
Status: DISCONTINUED | OUTPATIENT
Start: 2023-10-02 | End: 2023-10-03 | Stop reason: HOSPADM

## 2023-10-02 RX ORDER — HYDROMORPHONE HYDROCHLORIDE 1 MG/ML
0.4 INJECTION, SOLUTION INTRAMUSCULAR; INTRAVENOUS; SUBCUTANEOUS
Status: DISCONTINUED | OUTPATIENT
Start: 2023-10-02 | End: 2023-10-03 | Stop reason: HOSPADM

## 2023-10-02 RX ORDER — FENTANYL CITRATE 50 UG/ML
50 INJECTION, SOLUTION INTRAMUSCULAR; INTRAVENOUS EVERY 5 MIN PRN
Status: DISCONTINUED | OUTPATIENT
Start: 2023-10-02 | End: 2023-10-02 | Stop reason: HOSPADM

## 2023-10-02 RX ORDER — METHOCARBAMOL 500 MG/1
500 TABLET, FILM COATED ORAL 4 TIMES DAILY
Status: DISCONTINUED | OUTPATIENT
Start: 2023-10-02 | End: 2023-10-03 | Stop reason: HOSPADM

## 2023-10-02 RX ORDER — FENTANYL CITRATE 50 UG/ML
25-50 INJECTION, SOLUTION INTRAMUSCULAR; INTRAVENOUS
Status: DISCONTINUED | OUTPATIENT
Start: 2023-10-02 | End: 2023-10-02 | Stop reason: HOSPADM

## 2023-10-02 RX ORDER — PROCHLORPERAZINE MALEATE 5 MG
5 TABLET ORAL EVERY 6 HOURS PRN
Status: DISCONTINUED | OUTPATIENT
Start: 2023-10-02 | End: 2023-10-03 | Stop reason: HOSPADM

## 2023-10-02 RX ORDER — NICOTINE POLACRILEX 4 MG
15-30 LOZENGE BUCCAL
Status: DISCONTINUED | OUTPATIENT
Start: 2023-10-02 | End: 2023-10-03 | Stop reason: HOSPADM

## 2023-10-02 RX ORDER — PROPOFOL 10 MG/ML
INJECTION, EMULSION INTRAVENOUS CONTINUOUS PRN
Status: DISCONTINUED | OUTPATIENT
Start: 2023-10-02 | End: 2023-10-02

## 2023-10-02 RX ORDER — FLUMAZENIL 0.1 MG/ML
0.2 INJECTION, SOLUTION INTRAVENOUS
Status: DISCONTINUED | OUTPATIENT
Start: 2023-10-02 | End: 2023-10-02 | Stop reason: HOSPADM

## 2023-10-02 RX ORDER — CEFAZOLIN SODIUM/WATER 2 G/20 ML
2 SYRINGE (ML) INTRAVENOUS
Status: COMPLETED | OUTPATIENT
Start: 2023-10-02 | End: 2023-10-02

## 2023-10-02 RX ORDER — GABAPENTIN 600 MG/1
1200 TABLET ORAL 3 TIMES DAILY
Status: DISCONTINUED | OUTPATIENT
Start: 2023-10-02 | End: 2023-10-03 | Stop reason: HOSPADM

## 2023-10-02 RX ORDER — PAROXETINE 20 MG/1
40 TABLET, FILM COATED ORAL EVERY EVENING
Status: DISCONTINUED | OUTPATIENT
Start: 2023-10-02 | End: 2023-10-03 | Stop reason: HOSPADM

## 2023-10-02 RX ORDER — POLYETHYLENE GLYCOL 3350 17 G/17G
17 POWDER, FOR SOLUTION ORAL DAILY
Status: DISCONTINUED | OUTPATIENT
Start: 2023-10-03 | End: 2023-10-03 | Stop reason: HOSPADM

## 2023-10-02 RX ORDER — LISINOPRIL 20 MG/1
20 TABLET ORAL DAILY
Status: DISCONTINUED | OUTPATIENT
Start: 2023-10-02 | End: 2023-10-03 | Stop reason: HOSPADM

## 2023-10-02 RX ORDER — HYDROMORPHONE HYDROCHLORIDE 1 MG/ML
0.2 INJECTION, SOLUTION INTRAMUSCULAR; INTRAVENOUS; SUBCUTANEOUS EVERY 5 MIN PRN
Status: DISCONTINUED | OUTPATIENT
Start: 2023-10-02 | End: 2023-10-02 | Stop reason: HOSPADM

## 2023-10-02 RX ORDER — PANTOPRAZOLE SODIUM 40 MG/1
40 TABLET, DELAYED RELEASE ORAL
Status: DISCONTINUED | OUTPATIENT
Start: 2023-10-03 | End: 2023-10-03 | Stop reason: HOSPADM

## 2023-10-02 RX ORDER — ARIPIPRAZOLE 20 MG/1
20 TABLET ORAL EVERY EVENING
Status: DISCONTINUED | OUTPATIENT
Start: 2023-10-02 | End: 2023-10-03 | Stop reason: HOSPADM

## 2023-10-02 RX ORDER — AMOXICILLIN 250 MG
1 CAPSULE ORAL 2 TIMES DAILY
Status: DISCONTINUED | OUTPATIENT
Start: 2023-10-02 | End: 2023-10-03 | Stop reason: HOSPADM

## 2023-10-02 RX ORDER — ONDANSETRON 2 MG/ML
INJECTION INTRAMUSCULAR; INTRAVENOUS PRN
Status: DISCONTINUED | OUTPATIENT
Start: 2023-10-02 | End: 2023-10-02

## 2023-10-02 RX ORDER — GABAPENTIN 600 MG/1
1200 TABLET ORAL ONCE
Status: COMPLETED | OUTPATIENT
Start: 2023-10-02 | End: 2023-10-02

## 2023-10-02 RX ORDER — CEFAZOLIN SODIUM 2 G/100ML
2 INJECTION, SOLUTION INTRAVENOUS EVERY 8 HOURS
Status: COMPLETED | OUTPATIENT
Start: 2023-10-02 | End: 2023-10-03

## 2023-10-02 RX ADMIN — ARIPIPRAZOLE 20 MG: 20 TABLET ORAL at 19:32

## 2023-10-02 RX ADMIN — TRANEXAMIC ACID 1950 MG: 650 TABLET ORAL at 10:21

## 2023-10-02 RX ADMIN — OXYCODONE HYDROCHLORIDE 10 MG: 10 TABLET ORAL at 19:18

## 2023-10-02 RX ADMIN — LISINOPRIL 20 MG: 20 TABLET ORAL at 19:32

## 2023-10-02 RX ADMIN — ACETAMINOPHEN 975 MG: 325 TABLET, FILM COATED ORAL at 10:21

## 2023-10-02 RX ADMIN — BUPIVACAINE HYDROCHLORIDE 20 ML: 2.5 INJECTION, SOLUTION EPIDURAL; INFILTRATION; INTRACAUDAL at 11:30

## 2023-10-02 RX ADMIN — BUSPIRONE HYDROCHLORIDE 30 MG: 15 TABLET ORAL at 19:30

## 2023-10-02 RX ADMIN — Medication 2 G: at 12:27

## 2023-10-02 RX ADMIN — SODIUM CHLORIDE, POTASSIUM CHLORIDE, SODIUM LACTATE AND CALCIUM CHLORIDE: 600; 310; 30; 20 INJECTION, SOLUTION INTRAVENOUS at 12:00

## 2023-10-02 RX ADMIN — ACETAMINOPHEN 975 MG: 325 TABLET, FILM COATED ORAL at 19:30

## 2023-10-02 RX ADMIN — BUPIVACAINE HYDROCHLORIDE IN DEXTROSE 1.6 ML: 7.5 INJECTION, SOLUTION SUBARACHNOID at 12:08

## 2023-10-02 RX ADMIN — INSULIN GLARGINE 30 UNITS: 100 INJECTION, SOLUTION SUBCUTANEOUS at 22:09

## 2023-10-02 RX ADMIN — SENNOSIDES AND DOCUSATE SODIUM 1 TABLET: 50; 8.6 TABLET ORAL at 19:31

## 2023-10-02 RX ADMIN — DEXAMETHASONE SODIUM PHOSPHATE 2 MG: 10 INJECTION, SOLUTION INTRAMUSCULAR; INTRAVENOUS at 11:30

## 2023-10-02 RX ADMIN — GABAPENTIN 1200 MG: 600 TABLET, FILM COATED ORAL at 19:32

## 2023-10-02 RX ADMIN — PROPOFOL 100 MCG/KG/MIN: 10 INJECTION, EMULSION INTRAVENOUS at 12:22

## 2023-10-02 RX ADMIN — SIMVASTATIN 20 MG: 20 TABLET, FILM COATED ORAL at 22:04

## 2023-10-02 RX ADMIN — PHENYLEPHRINE HYDROCHLORIDE 0.2 MCG/KG/MIN: 10 INJECTION INTRAVENOUS at 12:52

## 2023-10-02 RX ADMIN — TRAZODONE HYDROCHLORIDE 75 MG: 150 TABLET ORAL at 22:04

## 2023-10-02 RX ADMIN — OXYCODONE HYDROCHLORIDE 10 MG: 10 TABLET ORAL at 23:03

## 2023-10-02 RX ADMIN — BUPROPION HYDROCHLORIDE 200 MG: 100 TABLET, EXTENDED RELEASE ORAL at 19:30

## 2023-10-02 RX ADMIN — SODIUM CHLORIDE 2 UNITS: 9 INJECTION, SOLUTION INTRAVENOUS at 18:12

## 2023-10-02 RX ADMIN — Medication 20 MCG: at 11:30

## 2023-10-02 RX ADMIN — ONDANSETRON 4 MG: 2 INJECTION INTRAMUSCULAR; INTRAVENOUS at 12:24

## 2023-10-02 RX ADMIN — PROPRANOLOL HYDROCHLORIDE 80 MG: 80 CAPSULE, EXTENDED RELEASE ORAL at 20:53

## 2023-10-02 RX ADMIN — METHOCARBAMOL 500 MG: 500 TABLET ORAL at 20:25

## 2023-10-02 RX ADMIN — PROPOFOL 20 MG: 10 INJECTION, EMULSION INTRAVENOUS at 12:24

## 2023-10-02 RX ADMIN — CEFAZOLIN SODIUM 2 G: 2 INJECTION, SOLUTION INTRAVENOUS at 20:25

## 2023-10-02 RX ADMIN — ROPINIROLE HYDROCHLORIDE 0.25 MG: 0.25 TABLET, FILM COATED ORAL at 19:31

## 2023-10-02 RX ADMIN — PAROXETINE HYDROCHLORIDE 40 MG: 20 TABLET, FILM COATED ORAL at 19:30

## 2023-10-02 RX ADMIN — GABAPENTIN 1200 MG: 600 TABLET, FILM COATED ORAL at 11:29

## 2023-10-02 ASSESSMENT — ACTIVITIES OF DAILY LIVING (ADL)
ADLS_ACUITY_SCORE: 26
ADLS_ACUITY_SCORE: 31
ADLS_ACUITY_SCORE: 26
ADLS_ACUITY_SCORE: 24
ADLS_ACUITY_SCORE: 24
ADLS_ACUITY_SCORE: 26
ADLS_ACUITY_SCORE: 24

## 2023-10-02 NOTE — BRIEF OP NOTE
Orthopaedic Surgery Brief Op-Note      Patient: Holly Muir  : 1955  Date of Service: 10/2/2023 2:55 PM    Pre-operative Diagnosis: Primary localized osteoarthritis of right knee [M17.11]  Post-operative Diagnosis: same    Procedure(s) Performed: Procedure(s):  ARTHROPLASTY, RIGHT KNEE, TOTAL    Staff: Dr. Sheehan  Assistants:   SAFIA MORALES MD     Anesthesia: Spinal   EBL: 25 cc    Implants:   Implant Name Type Inv. Item Serial No.  Lot No. LRB No. Used Action   BONE CEMENT SIMPLEX W/TOBRAMYCIN 6197-9-001 - [id number] Cement, Bone BONE CEMENT SIMPLEX W/TOBRAMYCIN 6197-9-001  [employer] [id number] Right 2 Implanted   IMP COMP FEMORAL SNN GEN II CR SZ 3 RT [id number] - [id number] Total Joint Component/Insert IMP COMP FEMORAL SNN GEN II CR SZ 3 RT [id number]  [employer] [id number] Right 1 Implanted   IMP BASEPLATE TIBIAL PARMJIT II SZ 2 RT TI [id number] - [id number] Total Joint Component/Insert IMP BASEPLATE TIBIAL PARMJIT II SZ 2 RT TI [id number]  [employer] [id number] Right 1 Implanted   S & N Articular Insert Total Joint Component/Insert   [employer] [id number] Right 1 Implanted     Drains: none  Intra-op Labs/Cxs/Specimens: None  Complications: No apparent complications during procedure  Findings: Please see dictated operative note for details    Disposition: Stable to PACU, then admit to Orthopaedics    POST OPERATIVE PLAN    Assessment/Plan: Holly Muir is a 68 year old female s/p Procedure(s):  ARTHROPLASTY, RIGHT KNEE, TOTAL on 10/2/2023 with Dr. Sheehan.    Activity: Up with assist and assistive devices as needed until independent. Posterior hip precautions to operative side x 3 months:  1) No hip flexion beyond 90 degrees  2) No adduction beyond midline  3) No internal rotation beyond neutral   Weight bearing status: WBAT  Antibiotics: Cefazolin x 24 hours  Diet: Begin with clear fluids and progress diet as tolerated. Bowel regimen.  Anti-emetics PRN.    DVT prophylaxis: Resume PTA Eliquis on POD 1   Elevation: Elevate heels off of bed on pillows   Wound Care: Tegaderm and alginate x 2 weeks   Drains: none  Pain management: Orals PRN, IV for breakthrough only  X-rays: AP Pelvis and Lateral operative hip in PACU.   Physical Therapy: Mobilization, ROM, ADL's, Posterior hip precautions.    Occupational Therapy: ADL's  Labs: Trend Hgb on POD #1, 2  Consults: PT, OT. Hospitalist, appreciate assistance in caring for this patient throughout the perioperative period    Future Appointments   Date Time Provider Department Center   10/3/2023  8:15 AM Daniella Fernandez, PT [facility]   10/3/2023  9:45 AM Latanya Beaver, JAMAL [facility]   10/3/2023  1:30 PM Daniella Fernandez, PT [facility]   10/6/2023 10:00 AM LAB FIRST FLOOR [facility] [employer]   10/9/2023  2:00 PM Lzi Calero, [facility]   10/17/2023 11:00 AM MG NURSE ONLY ORTHO [facility] [place]   11/14/2023 11:20 AM Sanford Sheehan MD [facility]   11/21/2023 10:15 AM [facility]   12/12/2023 11:20 AM Sanford Sheehan MD [facility] [place]   1/10/2024 10:00 AM [facility]       Disposition: Pending progress with therapies, pain control on orals, and medical stability, anticipate discharge to Home vs TCU on POD #1-2.    I assisted with positioning, prepping and draping, and closure.      Daniella Johnson PA-C 10/2/2023 2:55 PM  Orthopedic Surgery

## 2023-10-02 NOTE — OR NURSING
PACU to Inpatient Nursing Handoff    Patient Holly Muir is a 68 year old female who speaks English.   Procedure Procedure(s):  ARTHROPLASTY, RIGHT KNEE, TOTAL   Surgeon(s) Primary: Sanford Sheehan MD  Assisting: Daniella Johnson PA-C  Resident - Assisting: Sanford Saldana MD     Allergies   Allergen Reactions    Morphine Sulfate Itching       Isolation  No active isolations     Past Medical History   has a past medical history of Bilateral knee pain, Cataracts, both eyes (5/8/2013), Cervical cancer (H), Chronic kidney disease, stage 3a (H) (11/17/2021), Diabetes, Diabetic retinopathy (H), HTN, Inflammatory arthritis, Major depression, Memory loss, Nephropathy, OA (osteoarthritis) of knee (9/11/2013), Other and unspecified hyperlipidemia, Pterygium eye, and Sacral nerve root injury.    Anesthesia Spinal   Dermatome Level     Preop Meds acetaminophen (Tylenol) - time given: 1021  gabapentin (Neurontin) - time given: 1129  txa - time given: 1021   Nerve block Adductor canal.  Location:right. Med:bupivacaine. Time given: 1030   Intraop Meds ondansetron (Zofran): last given at 1224   Local Meds Yes   Antibiotics cefazolin (Ancef) - last given at 1227     Pain Patient Currently in Pain: no   PACU meds  Not applicable   PCA / epidural No   Capnography     Telemetry ECG Rhythm: Normal sinus rhythm   Inpatient Telemetry Monitor Ordered? No        Labs Glucose Lab Results   Component Value Date     10/02/2023    GLC 78 01/19/2023     08/24/2020       Hgb Lab Results   Component Value Date    HGB 9.6 09/15/2023    HGB 11.0 10/14/2020       INR No results found for: INR   PACU Imaging Completed     Wound/Incision Incision/Surgical Site 10/02/23 Right Knee (Active)   Incision Assessment UTV 10/02/23 1540   Incision Drainage Amount None 10/02/23 1540   Dressing Intervention Clean, dry, intact 10/02/23 1540   Number of days: 0      CMS        Equipment ice pack   Other LDA       IV Access Peripheral IV  10/02/23 Right;Posterior Hand (Active)   Site Assessment WDL 10/02/23 1500   Line Status Infusing 10/02/23 1500   Dressing Transparent 10/02/23 1043   Dressing Status clean;dry;intact 10/02/23 1043   Dressing Intervention New dressing  10/02/23 1043   Phlebitis Scale 0-->no symptoms 10/02/23 1043   Infiltration? no 10/02/23 1043   Number of days: 0      Blood Products Not applicable EBL 25   mL   Intake/Output Date 10/02/23 0700 - 10/03/23 0659   Shift 3457-3615 9060-1702 2645-5018 24 Hour Total   INTAKE   I.V. 600   600   Shift Total(mL/kg) 600(7.1)   600(7.1)   OUTPUT   Blood 25   25   Shift Total(mL/kg) 25(0.3)   25(0.3)   Weight (kg) 84.5 84.5 84.5 84.5      Drains / Bhatt Colostomy (Active)   Stomal Appliance 1 piece 10/02/23 1500   Stoma Assessment [place] 10/02/23 1500   Peristomal Assessment UTV 10/02/23 1500   Stool Amount Small 10/02/23 1500   Number of days:       Time of void PreOp Time of Void Prior to Procedure: 1030 (10/02/23 1100)    PostOp      Diapered? No   Bladder Scan     PO    tolerating sips and water     Vitals    B/P: (!) 144/65  T: 99  F (37.2  C)    Temp src: Axillary  P:  Pulse: 71 (10/02/23 1500)          R: 25  O2:  SpO2: 99 %    O2 Device: None (Room air) (10/02/23 1500)    Oxygen Delivery: 3 LPM (10/02/23 1141)         Family/support present Cata daughter is in the waiting room   Patient belongings     Patient transported on cart   DC meds/scripts (obs/outpt) Not applicable   Inpatient Pain Meds Released? Yes         Special needs/considerations Colostomy bag   Tasks needing completion None       Елена Yanez, RN  [facility] [id number]

## 2023-10-02 NOTE — OP NOTE
OPERATIVE REPORT    DATE OF SERVICE: 10/2/23    SURGEON: Sanford Sheehan MD.    ASSISTANT(S):  Maggy Johnson PA-C and Sanford Saldana MD    PREOPERATIVE DIAGNOSIS:  Osteoarthritis    POSTOPERATIVE DIAGNOSIS:  Osteoarthritis    OPERATION PERFORMED:  right total knee arthroplasty    IMPLANTS:    Implant Name Type Inv. Item Serial No.  Lot No. LRB No. Used Action   BONE CEMENT SIMPLEX W/TOBRAMYCIN 6197-9-001 - [id number] Cement, Bone BONE CEMENT SIMPLEX W/TOBRAMYCIN 6197-9-001  [employer] [id number] Right 2 Implanted   IMP COMP FEMORAL SNN GEN II CR SZ 3 RT [id number] - [id number] Total Joint Component/Insert IMP COMP FEMORAL SNN GEN II CR SZ 3 RT [id number]  [employer] [id number] Right 1 Implanted   IMP BASEPLATE TIBIAL PARMJIT II SZ 2 RT TI [id number] - [id number] Total Joint Component/Insert IMP BASEPLATE TIBIAL PARMJIT II SZ 2 RT TI [id number]  [employer] [id number] Right 1 Implanted   S & N Articular Insert Total Joint Component/Insert   [employer] [id number] Right 1 Implanted       ANESTHETIC: spinal      OPERATIVE FINDINGS:  End stage arthrosis of the knee. Collapse of the medial femoral condyle to th depth of about 8 mm. Both were brought back to normal appearing bone after standard, primary total knee cuts and a 9 mm poly insert was used.  Patella appropriate for patellar retention.     BLOOD LOSS: about 25 ml     TOURNIQUET TIME: 84 minutes at 300 mm Hg    COMPLICATIONS:  None apparent    OPERATIVE INDICATIONS:  The patient has a long history of debilitating pain secondary to ostearthritis of the knee.  Despite comprehensive non-operative management these symptoms continued to interfere with activities of daily living.  After discussion of further treatment options including the risks and benefits that patient elected to proceed with a total knee.    DESCRIPTION OF THE PROCEDURE:  The patient was identified in the preoperative holding area.  The consent form including the  risks and benefits were reviewed with the patient.  The operative limb was identified and marked.  The patient was brought back to the operating room and placed on the operating table.  A spinal anesthetic was induced by the anesthesia team.   The patient was prepped and draped in the normal standard fashion for a knee replacement.  A time-out was called.  Antibiotics were given.The tourniquet was inflated.  We utilized an approximately 15 cm curvilinear incision, centered on the patella ridge, and performed a standard parapatellar approach to the knee. There was normal appearing joint fluid seen upon arthrotomy. A modest medial release was performed. The patella was everted. Medial and lateral retractors were placed. The knee was brought into flexion. The AP axis of the knee was marked and an intra-medullary femoral guide melissa was placed as templated. The epicondylar axis was then marked. The anterior femoral cutting guide was placed and the epicondylar and AP axes were used to set the rotation of the jig. A stylus was then used to set the depth of the resection. Retractors were used to protect the skin and the anterior cut was made. The distal femoral cutting guide was then placed and pinned. The resection was set to remove the cartilage from the intra-condylar notch. The femoral sizing guide was then placed; the knee sized well to a 3. The four-in-one femoral cutting guide was then placed and pinned. The cuts were made. The trial component was well fit. Attention was then turned to the tibia. A PCL retractor was then placed and used to bring the tibia anterior. Medial and lateral retractors were placed. A tibial intra-medullary guide melissa was placed. The tibial cutting guide was then assembled on the guide melissa. The slope was set to nearly mirror the anatomic slope. The tibial cut was first checked with a two-and-nine guide and the cut was then made. A lamina  was then used to remove the necessary bone from  the posterior condyles and then the remaining meniscus was removed. The tibia and was sized and it sized well to a 2. The trial components were then assembled sony  9 mm trial poly was used for trialing. The knee came out into full extension and the knee had greater than 120 degrees of flexion. It was well balanced in the coronal plane with varus and valgus stress at 0 and 30 degrees of flexion. Happy with the reconstruction the tibial rotation was marked, the trial components were removed, and all cancellous surfaces were cleaned with a pulse lavage and then dried. The components were cemented into place, a trial poly placed, the knee brought into extension, and the cement was allowed to dry. With the cement dry the knee was lavaged. The tourniquet was let down and hemostasis was achieved. The knee was then brought into extension and the final poly was placed. It was seen to lock into place. The soft tissues were then injected with analgesic. The capsule was closed with interrupted Vicryl, the dermis with interrupted Vicryl, and skin with running monocryl, Dermabond and steri-strips.  At the end of the procedure the sponge and needle counts were correct times two.  The patient tolerated the procedure well and returned to the PAR extubated and stable.    POSTOPERATIVE PLAN:  1. Weight bearing as tolerated  2. DVT prophylaxis restart anticoag on POD 1  4. 24 hours of prophylactic antibiotics  5. Follow-up:  Wound clinic in 2 weeks and with [facility] in clinic in 6 weeks for x-rays and a rehabilitation check.

## 2023-10-02 NOTE — ANESTHESIA PROCEDURE NOTES
"Intrathecal injection Procedure Note    Pre-Procedure   Staff -        Anesthesiologist:  Weston Davis MD       CRNA: Cecil Hawkins       Performed By: anesthesiologist       Location: OR       Procedure Start/Stop Times: 10/2/2023 12:08 PM and 10/2/2023 12:18 PM       Pre-Anesthestic Checklist: patient identified, IV checked, risks and benefits discussed, informed consent, monitors and equipment checked, pre-op evaluation, at physician/surgeon's request and post-op pain management  Timeout:       Correct Patient: Yes        Correct Procedure: Yes        Correct Site: Yes        Correct Position: Yes   Procedure Documentation  Procedure: intrathecal injection       Patient Position: sitting       Patient Prep/Sterile Barriers: sterile gloves, mask, patient draped       Skin prep: Chloraprep       Insertion Site: L3-4. (midline approach).       Needle Gauge: 25.        Needle Length (Inches): 3.5        Spinal Needle Type: Jose tip       Introducer used       # of attempts: 2 and  # of redirects:  2    Assessment/Narrative         Paresthesias: No.       CSF fluid: clear.    Medication(s) Administered   0.75% Hyperbaric Bupivacaine (Intrathecal) - Intrathecal   1.6 mL - 10/2/2023 12:08:00 PM  Medication Administration Time: 10/2/2023 12:08 PM      FOR [facility] ([facility]) ONLY:   Pain Team Contact information: please page the Pain Team Via [employer]. Search \"Pain\". During daytime hours, please page the attending first. At night please page the resident first.      "

## 2023-10-02 NOTE — ANESTHESIA PROCEDURE NOTES
Adductor canal Procedure Note    Pre-Procedure   Staff -        Anesthesiologist:  Car Lomeli MD       Performed By: anesthesiologist       Procedure Start/Stop Times: 10/2/2023 11:30 AM and 10/2/2023 11:45 AM       Pre-Anesthestic Checklist: patient identified, IV checked, site marked, risks and benefits discussed, informed consent, monitors and equipment checked, pre-op evaluation, at physician/surgeon's request and post-op pain management  Timeout:       Correct Patient: Yes        Correct Procedure: Yes        Correct Site: Yes        Correct Position: Yes        Correct Laterality: Yes        Site Marked: Yes  Procedure Documentation  Procedure: Adductor canal       Diagnosis: ACUTE POSTOPERATIVE PAIN       Laterality: right       Patient Position: supine       Skin prep: Chloraprep       Needle Type: insulated       Needle Gauge: 22.        Needle Length (millimeters): 100        Ultrasound guided       1. Ultrasound was used to identify targeted nerve, plexus, vascular marker, or fascial plane and place a needle adjacent to it in real-time.       2. Ultrasound was used to visualize the spread of anesthetic in close proximity to the above referenced structure.       3. A permanent image is entered into the patient's record.       4. The visualized anatomic structures appeared normal.    Assessment/Narrative         The placement was negative for: blood aspirated, painful injection and site bleeding       Paresthesias: No.       Bolus given via needle..        Secured via.        Insertion/Infusion Method: Single Shot       Complications: none    Medication(s) Administered   Bupivacaine 0.25% PF (Infiltration) - Infiltration   20 mL - 10/2/2023 11:30:00 AM  Dexamethasone 10 mg/mL PF (Perineural) - Perineural   2 mg - 10/2/2023 11:30:00 AM  Dexmedetomidine 4 mcg/mL (Perineural) - Perineural   20 mcg - 10/2/2023 11:30:00 AM  Medication Administration Time: 10/2/2023 11:30 AM     Comments:  Patient tolerated  "procedure well. No paresthesias, aspirated heme, or other complications noted. Needle visualized throughout without evidence of nerve/needle contact.        FOR [facility] ([facility]) ONLY:   Pain Team Contact information: please page the Pain Team Via [employer]. Search \"Pain\". During daytime hours, please page the attending first. At night please page the resident first.      "

## 2023-10-02 NOTE — CONSULTS
[facility]  Consult Note - Hospitalist Service  Date of Admission:  10/2/2023  Consult Requested by: Daniella Johnson PA-C   Reason for Consult: Medical co-management s/p TKA     Assessment & Plan   Holly Muir is a 68 year old female admitted on 10/2/2023 for a right TKA. She has a past medical history of B/L knee pain, OA of the knee, B/L cataracts, cervical cancer, colostomy, KONSTANTIN, CKD stage IIIa, diabetes type 2,  diabetic retinopathy, inflammatory arthritis, memory loss, neuropathy, HLD, HTN, urinary retention and incontinence, ISABELA, MDD, schizoaffective disorder, GERD, PE, and RLS.     S/P right TKA   B/L knee pain  Right knee OA:  EBL of 25.   - Per primary team, Orthopedics  Activity: Up with assist and assistive devices as needed until independent. Posterior hip precautions to operative side x 3 months:  1) No hip flexion beyond 90 degrees  2) No adduction beyond midline  3) No internal rotation beyond neutral   Weight bearing status: WBAT  Antibiotics: Cefazolin x 24 hours  Diet: Begin with clear fluids and progress diet as tolerated. Bowel regimen. Anti-emetics PRN.    DVT prophylaxis: Resume PTA Eliquis on POD 1   Elevation: Elevate heels off of bed on pillows   Wound Care: Tegaderm and alginate x 2 weeks   Drains: none  Pain management: Orals PRN, IV for breakthrough only  X-rays: AP Pelvis and Lateral operative hip in PACU.   Physical Therapy: Mobilization, ROM, ADL's, Posterior hip precautions.    Occupational Therapy: ADL's  Labs: Trend Hgb on POD #1, 2  Consults: PT, OT. Hospitalist, appreciate assistance in caring for this patient throughout the perioperative period    Type 2 diabetes (A1c of 7.7 on 9/15/23)  Diabetic retinopathy  Diabetic neuropathy: BG trends as below.  Had been taking lantus 20 units over the past week, but usually at 30-35 units. Per last pharmacy note on 8/9/23: Lantus (gives around 9pm) 30-35 units; if blood sugar >/=200  taking 35 units if blood sugar <200 taking 30 units. Novolog sliding scale-does not have a scale that she follows   metformin ER 1000 mg in the morning, 500 mg at night, glimepiride 4 mg 2 times daily  pioglitazone 45 mg once daily  - Continue PTA lantus 30 units at bedtime given holding PO agents   - Medium sliding scale insulin   - HOLD glimepiride 4 mg BID, metformin 1000 mg, Actos 45 mg daily restart post-op as able  - Hypoglycemic protocol    Recent Labs   Lab 10/02/23  1639 10/02/23  1506 10/02/23  1358 10/02/23  1012   * 182* 180* 190*       OUD  - Agree with pre-op eval recommending IP pain service   - Continue PTA gabapentin 1,200 mg BID    HLD  HTN: Mildly hypertensive after surgery  - Continue lisinopril 20 mg for formulary exchange for PTA ramipril 5 mg daily with hold parameters, simvastatin 20 mg at bedtime    Hx of PE, provoked (5/7/23) without evidence of LE DVT: Had been on apixaban 5 mg PO BID until 2 monthhs prior to surgery.   - Agree with restart of Eliquis per primary team above  - Will need to be on AC for 2 months after right TKA    S/P partial colectomy with colostomy creation (2002):   - Consider WOCN consult  - Pt to bring home supplies  - Monitor     GERD: Exchange PTA omprazole 40 mg daily for protonix 40 mg daily     Chronic anemia: Combination of KONSTANTIN and anemia of chronic disease. Baseline Hgb ~9.2-10.   - Agree with POD 1 trend of Hgb  - Continue PTA ferrous gluconate    ISABELA  MDD  OCD  Schizoaffective disorder   -Continue PTA Abilify, Wellbutrin, Buspar, Paxil    RLS: Continue PTA requip 0.25 mg at bedtime    Insomnia: Continue PTA trazodone 75 mg at bedtime     Hyponatremia, chronic: See trends below.  - Trend BMP in AM  - Continue 2L FR     Sodium   Date Value Ref Range Status   09/15/2023 133 (L) 136 - 145 mmol/L Final   08/01/2023 131 (L) 136 - 145 mmol/L Final   07/14/2023 133 (L) 136 - 145 mmol/L Final   06/07/2023 134 (L) 136 - 145 mmol/L Final   08/24/2020 135 133 - 144  "mmol/L Final   06/18/2020 136 133 - 144 mmol/L Final   07/29/2019 136 133 - 144 mmol/L Final   08/08/2018 131 (L) 133 - 144 mmol/L Final        The patient's care was discussed with the Bedside Nurse, Patient, and Primary team (via this note).     Clinically Significant Risk Factors Present on Admission               # Drug Induced Coagulation Defect: home medication list includes an anticoagulant medication  # Drug Induced Platelet Defect: home medication list includes an antiplatelet medication   # Hypertension: Noted on problem list     # DMII: A1C = 7.7 % (Ref range: 0.0 - 5.6 %) within past 6 months    # Obesity: Estimated body mass index is 34.07 kg/m  as calculated from the following:    Height as of this encounter: 1.575 m (5' 2\").    Weight as of this encounter: 84.5 kg (186 lb 4.6 oz).              KASIE Whitt [facility]  Hospitalist Service  Securely message with Vocera (more info)  Text page via [facility] Paging/Directory   ______________________________________________________________________    Chief Complaint   S/P Right TKA  Medical co-management     History is obtained from the patient and per chart review    History of Present Illness   Holly Muir is a 68 year old female admitted on 10/2/2023 for a right TKA. She has a past medical history of B/L knee pain, OA of the knee, B/L cataracts, cervical cancer, colostomy, KONSTANTIN, CKD stage IIIa, diabetes type 2,  diabetic retinopathy, inflammatory arthritis, memory loss, neuropathy, HLD, HTN, urinary retention and incontinence, ISABELA, MDD, schizoaffective disorder, GERD, PE, and RLS.     Upon interview and exam, pt in some mild pain. Denies any headaches, fevers, dysphagia, GERD s/s, SOB, chest pain, N/V, abdominal pain, dysuria, back pain or leg swelling.     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       Past Surgical History   Past Surgical History:   Procedure Laterality Date    ARTHROSCOPY KNEE RT/LT      LT    BLEPHAROPLASTY BILATERAL Bilateral 8/16/2019    Procedure: BILATERAL UPPER LID BLEPHAROPLASTY;  Surgeon: Randolph Cook MD;  [facility]: [facility]    BREAST LUMPECTOMY, RT/LT      LT-benign     CATARACT IOL, RT/LT      COLONOSCOPY  5/10/2012    Procedure:COLONOSCOPY; screening colonoscopy; Surgeon:MILLA VEGA; Location:[facility] OR    COLONOSCOPY WITH CO2 INSUFFLATION N/A 8/2/2019    Procedure: Colonoscopy with CO2 insufflation;  Surgeon: Himanshu Hi MD;  Location: [facility]    COLOSTOMY  2000    COLOSTOMY      HYSTERECTOMY, PAP NO LONGER INDICATED      done in [place]-cervical cancer    IMPLANT STIMULATOR SACRAL NERVE STAGE ONE Right 3/10/2015    Procedure: IMPLANT STIMULATOR SACRAL NERVE STAGE ONE;  Surgeon: Teodora Yusuf MD;  [facility]    IMPLANT STIMULATOR SACRAL NERVE STAGE TWO Right 3/31/2015    Procedure: IMPLANT STIMULATOR SACRAL NERVE STAGE TWO;  Surgeon: Teodora Yusuf MD;  [facility]    IMPLANT STIMULATOR SACRAL NERVE STAGE TWO Right 6/22/2020    Procedure: INSERTION, SACRAL NERVE STIMULATOR, STAGE 2 (replacement of interstim battery);  Surgeon: Teodora Yusuf MD;  [facility]    INJECT EPIDURAL TRANSFORAMINAL Right 1/20/2023    Procedure: Right Lumbar 5-Sacral 1 Transforaminal Epidural Steroid Injection with fluoroscopic guidance and contrast;  Surgeon: Kulwinder Booth MD;  [facility]    INJECT EPIDURAL TRANSFORAMINAL Right 3/23/2023    Procedure: Right Lumbar 5-Sacral 1 Transforaminal Epidural Steroid Injection with fluoroscopic guidance and contrast.;  Surgeon: Kulwinder Booth  MD LORE;  [facility]: [facility]    PHACOEMULSIFICATION WITH STANDARD INTRAOCULAR LENS IMPLANT Left 9/26/2019    Procedure: LEFT PHACOEMULSIFICATION, CATARACT, WITH STANDARD IOL INSERTION;  Surgeon: Francesco Winn MD;  [facility]    PHACOEMULSIFICATION WITH STANDARD INTRAOCULAR LENS IMPLANT Right 10/24/2019    Procedure: RIGHT PHACOEMULSIFICATION, CATARACT, WITH STANDARD IOL INSERTION;  Surgeon: Francesco Winn MD;  [facility]    REPAIR PTOSIS Bilateral 08/16/2019    SALPINGO OOPHORECTOMY,R/L/JENNIFER      Salpingo Oophorectomy, RT/LT/JENNIFER    ZZC EXCIS PTERYGIUM  10/08    Jayne Eye    [facility] STOMACH SURGERY PROCEDURE UNLISTED      ZZ COLONOSCOPY THRU STOMA, DIAGNOSTIC  2002    normal per report-no records available-records destroyed       Medications   I have reviewed this patient's current medications  Medications Prior to Admission   Medication Sig Dispense Refill Last Dose    ACETAMINOPHEN EXTRA STRENGTH 500 MG tablet TAKE 2 TABLETS BY MOUTH EVERY 8 HOURS. MAX ACETAMINOPHEN DOSE: 4000MG IN 24 HRS. 180 tablet 1 Past Week    apixaban ANTICOAGULANT (ELIQUIS) 5 MG tablet Take 1 tablet (5 mg) by mouth 2 times daily 60 tablet 2 9/29/2023 at 0800    ARIPiprazole (ABILIFY) 20 MG tablet Take 1 tablet by mouth every evening   10/1/2023 at 2100    blood glucose (NO BRAND SPECIFIED) test strip Use to test blood sugar 4 times daily or as directed. accu check guide strips 360 strip 3 10/1/2023    blood glucose monitoring (NO BRAND SPECIFIED) meter device kit Use to test blood sugar 4  times daily or as directed. Accuceck Guide meter 1 kit 1 10/1/2023    blood glucose monitoring (ONE TOUCH ULTRA 2) meter device kit USE TO TEST BLOOD SUGAR 4 TIMES DAILY OR AS DIRECTED. ACCUCECK GUIDE METER   10/1/2023    blood glucose monitoring (ULTRA THIN 30G) lancets Use to test blood sugar 4  times daily or as directed.lancets for acchu check guide 400 each 3 10/1/2023    buPROPion (WELLBUTRIN SR) 200 MG 12 hr tablet Take 1  tablet by mouth every evening   10/1/2023 at 2100    busPIRone HCl (BUSPAR) 30 MG tablet TAKE 1 TABLET BY MOUTH TWICE A DAY (Patient taking differently: 30 mg TAKE 1 TABLET BY MOUTH TWICE A DAY (0700 and 2100)) 60 tablet 5 9/29/2023    Continuous Blood Gluc  (FREESTYLE CM 2 READER) GALINA 1 Dose continuous 1 each 0 10/2/2023    Continuous Blood Gluc Sensor (FREESTYLE CM 2 SENSOR) MISC 1 Dose continuous 9 each 3 10/2/2023    [facility] ASPIRIN ADULT LOW DOSE 81 MG chewable tablet TAKE 1 TABLET BY MOUTH EVERY DAY 90 tablet 0 9/26/2023    diclofenac (VOLTAREN) 1 % topical gel Apply 4 g topically 4 times daily 350 g 3 Past Month    ferrous gluconate (FERGON) 324 (38 Fe) MG tablet Take 1 tablet (324 mg) by mouth daily (with breakfast) 90 tablet 3 9/26/2023    glimepiride (AMARYL) 2 MG tablet Take 2 tablets (4 mg) by mouth 2 times daily (before meals) 360 tablet 3 9/29/2023    insulin aspart (NOVOLOG FLEXPEN) 100 UNIT/ML pen Using as needed for high blood sugars   More than a month    insulin glargine (LANTUS SOLOSTAR) 100 UNIT/ML pen Inject 35-42 units at bedtime as directed + 2 units for priming of pen. Max dose 44 units. (Patient taking differently: 20 Units At Bedtime Inject 35-42 units at bedtime as directed + 2 units for priming of pen. Max dose 44 units.) 45 mL 1 10/1/2023    insulin pen needle (31G X 5 MM) 31G X 5 MM miscellaneous Use four times 4 day times a day 200 each 3 10/1/2023    Multiple Vitamin (DAILY-ISAAC MULTIVITAMIN) TABS TAKE 1 TABLET BY MOUTH EVERY DAY 90 tablet 0 9/26/2023    naloxone (NARCAN) 4 MG/0.1ML nasal spray Spray 1 spray (4 mg) into one nostril alternating nostrils once as needed for opioid reversal every 2-3 minutes until assistance arrives 0.2 mL 0 Unknown    nystatin (MYCOSTATIN) 585993 UNIT/GM external powder Apply 1 dose topically 3 times daily as needed 60 g 3 Past Month    omeprazole (PRILOSEC) 20 MG DR capsule TAKE 2 CAPSULES (40 MG) BY MOUTH DAILY *TAKE 30-60 MINUTES BEFORE A  MEAL* 180 capsule 2 9/29/2023    oxyCODONE (ROXICODONE) 5 MG tablet Take 1 tablet (5 mg) by mouth every 6 hours as needed for moderate to severe pain 28 tablet 0 9/30/2023    PARoxetine (PAXIL) 40 MG tablet Take 1 tablet by mouth every evening   9/29/2023    pioglitazone (ACTOS) 45 MG tablet Take 1 tablet (45 mg) by mouth daily 90 tablet 1 10/1/2023 at 2100    propranolol ER (INDERAL LA) 80 MG 24 hr capsule TAKE 1 CAPSULE BY MOUTH EVERY DAY 90 capsule 3 10/1/2023 at 2100    ramipril (ALTACE) 5 MG capsule TAKE 1 CAPSULE BY MOUTH EVERY DAY 90 capsule 2 9/29/2023    rOPINIRole (REQUIP) 0.25 MG tablet TAKE 1 TABLET (0.25 MG) BY MOUTH EVERY EVENING 90 tablet 0 9/29/2023    simvastatin (ZOCOR) 20 MG tablet TAKE 1 TABLET BY MOUTH EVERYDAY AT BEDTIME 90 tablet 2 9/29/2023    tiZANidine (ZANAFLEX) 2 MG tablet TAKE 1 TABLET BY MOUTH THREE TIMES A DAY AS NEEDED FOR MUSCLE SPASM 270 tablet 0 9/29/2023    traZODone (DESYREL) 50 MG tablet Take 75 mg by mouth At Bedtime       vitamin C ([facility]S VITAMIN C) 500 MG tablet 1/2 TABLET BY MOUTH JACOBSON WITH IRON SUPPLEMENT BEFORE MEAL 45 tablet 3 Past Month    VITAMIN D3 25 MCG (1000 UT) tablet TAKE 1 TABLET BY MOUTH EVERY DAY 90 tablet 3 Past Month    gabapentin (NEURONTIN) 600 MG tablet TAKE 2 TABLETS (1,200 MG) BY MOUTH 3 TIMES DAILY FOR 30 DAYS 180 tablet 5 9/29/2023    metFORMIN (GLUCOPHAGE XR) 500 MG 24 hr tablet Take two tablets with breakfast and one tablet with dinner for one week then increase to two tablets with breakfast and two tablets with dinner thereafter (Patient taking differently: 1,000 mg 2 times daily (with meals) Take two tablets with breakfast and one tablet with dinner for one week then increase to two tablets with breakfast and two tablets with dinner thereafter) 360 tablet 1 9/29/2023          Review of Systems    The 10 point Review of Systems is negative other than noted in the HPI or here.     Social History   I have reviewed this patient's social history and  updated it with pertinent information if needed.  Social History     Tobacco Use    Smoking status: Former     Packs/day: 0.00     Types: Cigarettes     Start date: 1974     Quit date: 1975     Years since quittin.1     Passive exposure: Never    Smokeless tobacco: Never   Vaping Use    Vaping Use: Never used   Substance Use Topics    Alcohol use: Yes     Alcohol/week: 0.0 standard drinks of alcohol     Comment: social occasions    Drug use: No         Allergies   Allergies   Allergen Reactions    Morphine Sulfate Itching        Physical Exam   Vital Signs: Temp: 97.9  F (36.6  C) Temp src: Axillary BP: (!) 155/77 Pulse: 72   Resp: (!) 36 SpO2: 100 % O2 Device: None (Room air) Oxygen Delivery: 2 LPM  Weight: 186 lbs 4.62 oz    General Appearance: In NAD, sitting in bed  Respiratory: LS clear b/l, normal RR  Cardiovascular: S1, S2, no m/r/g  GI: BS+, all 4 quadrants, no masses, non-tender upon palpation  Skin: Intact on face, arms, legs, unable to see surgical site.  No wounds, bruising, or lesions noted.  Other: A&Ox4, moving all extremities      Medical Decision Making       60 MINUTES SPENT BY ME on the date of service doing chart review, history, exam, documentation & further activities per the note.      Data         Imaging results reviewed over the past 24 hrs:   Recent Results (from the past 24 hour(s))   POC US Guidance Needle Placement    Impression    R Adductor Canal Block   XR Knee Port Right 1/2 Views    Narrative    EXAM: XR KNEE PORT RIGHT 1/2 VIEWS  LOCATION: [facility]  DATE: 10/2/2023    INDICATION: Post Op Total Knee  COMPARISON: 2023      Impression    IMPRESSION: Recent total knee arthroplasty. Alignment is satisfactory. There is a small bone fragment along the medial margin of the medial tibial plateau related to spur on the prior exam. Postoperative gas in the soft tissues and knee joint..

## 2023-10-02 NOTE — ANESTHESIA POSTPROCEDURE EVALUATION
Patient: Holly Muir    Procedure: Procedure(s):  ARTHROPLASTY, RIGHT KNEE, TOTAL       Anesthesia Type:  MAC, Spinal    Note:  Disposition: Outpatient   Postop Pain Control: Uneventful            Sign Out: Well controlled pain   PONV: No   Neuro/Psych: Uneventful            Sign Out: Acceptable/Baseline neuro status   Airway/Respiratory: Uneventful            Sign Out: Acceptable/Baseline resp. status   CV/Hemodynamics: Uneventful            Sign Out: Acceptable CV status; No obvious hypovolemia; No obvious fluid overload   Other NRE: NONE   DID A NON-ROUTINE EVENT OCCUR?            Last vitals:  Vitals Value Taken Time   /74 10/02/23 1745   Temp 36.6  C (97.9  F) 10/02/23 1715   Pulse 78 10/02/23 1757   Resp 25 10/02/23 1757   SpO2 98 % 10/02/23 1757   Vitals shown include unvalidated device data.    Electronically Signed By: Sreedhar Mc MD  October 2, 2023  5:58 PM

## 2023-10-02 NOTE — ANESTHESIA PREPROCEDURE EVALUATION
Anesthesia Pre-Procedure Evaluation    Patient: Holly Muir   MRN: [id number] : 1955        Procedure : Procedure(s):  ARTHROPLASTY, RIGHT KNEE, TOTAL          Past Medical History:   Diagnosis Date    Bilateral knee pain     Cataracts, both eyes 2013    Cervical cancer (H)     Chronic kidney disease, stage 3a (H) 2021    Diabetes     Diabetic retinopathy (H)     HTN     Inflammatory arthritis     Major depression     Memory loss     Nephropathy     OA (osteoarthritis) of knee 2013    Other and unspecified hyperlipidemia     Pterygium eye     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      Past Surgical History:   Procedure Laterality Date    ARTHROSCOPY KNEE RT/LT      LT    BLEPHAROPLASTY BILATERAL Bilateral 2019    Procedure: BILATERAL UPPER LID BLEPHAROPLASTY;  Surgeon: Randolph Cook MD;  Location: [facility]    BREAST LUMPECTOMY, RT/LT      LT-benign     CATARACT IOL, RT/LT      COLONOSCOPY  5/10/2012    Procedure:COLONOSCOPY; screening colonoscopy; Surgeon:MILLA VEGA; [facility]    COLONOSCOPY WITH CO2 INSUFFLATION N/A 2019    Procedure: Colonoscopy with CO2 insufflation;  Surgeon: Himanshu Hi MD;  [facility]: [facility]    COLOSTOMY  2000    COLOSTOMY      HYSTERECTOMY, PAP NO LONGER INDICATED      done in [place]-cervical cancer    IMPLANT STIMULATOR SACRAL NERVE STAGE ONE Right 3/10/2015    Procedure: IMPLANT STIMULATOR SACRAL NERVE STAGE ONE;  Surgeon: Teodora Yusuf MD;  [facility]    IMPLANT STIMULATOR SACRAL NERVE STAGE TWO Right 3/31/2015    Procedure: IMPLANT STIMULATOR SACRAL NERVE STAGE TWO;  Surgeon: Teodora Yusuf MD;  [facility]    IMPLANT STIMULATOR SACRAL NERVE STAGE TWO Right 2020    Procedure: INSERTION, SACRAL NERVE STIMULATOR, STAGE 2 (replacement of interstim battery);  Surgeon: Teodora Yusuf MD;  [facility]    INJECT EPIDURAL TRANSFORAMINAL Right 2023    Procedure: Right Lumbar 5-Sacral 1  Transforaminal Epidural Steroid Injection with fluoroscopic guidance and contrast;  Surgeon: Kulwinder Booth MD;  [facility]    INJECT EPIDURAL TRANSFORAMINAL Right 3/23/2023    Procedure: Right Lumbar 5-Sacral 1 Transforaminal Epidural Steroid Injection with fluoroscopic guidance and contrast.;  Surgeon: Kulwinder Booth MD;  [facility]    PHACOEMULSIFICATION WITH STANDARD INTRAOCULAR LENS IMPLANT Left 2019    Procedure: LEFT PHACOEMULSIFICATION, CATARACT, WITH STANDARD IOL INSERTION;  Surgeon: Francesco Winn MD;  [facility]    PHACOEMULSIFICATION WITH STANDARD INTRAOCULAR LENS IMPLANT Right 10/24/2019    Procedure: RIGHT PHACOEMULSIFICATION, CATARACT, WITH STANDARD IOL INSERTION;  Surgeon: Francesco Winn MD;  [facility]    REPAIR PTOSIS Bilateral 2019    SALPINGO OOPHORECTOMY,R/L/JENNIFER      Salpingo Oophorectomy, RT/LT/JENNIFER    ZZC EXCIS PTERYGIUM  10/08    [facility]ne [facility]    [facility]C STOMACH SURGERY PROCEDURE UNLISTED      ZZ COLONOSCOPY THRU STOMA, DIAGNOSTIC      normal per report-no records available-records destroyed      Allergies   Allergen Reactions    Morphine Sulfate Itching      Social History     Tobacco Use    Smoking status: Former     Packs/day: 0.00     Types: Cigarettes     Start date: 1974     Quit date: 1975     Years since quittin.1     Passive exposure: Never    Smokeless tobacco: Never   Substance Use Topics    Alcohol use: Yes     Alcohol/week: 0.0 standard drinks of alcohol     Comment: social occasions      Wt Readings from Last 1 Encounters:   10/02/23 84.5 kg (186 lb 4.6 oz)        Anesthesia Evaluation            ROS/MED HX  ENT/Pulmonary:     (+)                             recent URI,          Neurologic:       Cardiovascular:     (+)  hypertension- -   -  - -                                      METS/Exercise Tolerance:     Hematologic:     (+) History of blood clots,    pt is anticoagulated,           Musculoskeletal:        GI/Hepatic:     (+) GERD, Asymptomatic on medication,                  Renal/Genitourinary:     (+) renal disease, type: CRI,            Endo:     (+)  type II DM,       Diabetic complications: nephropathy neuropathy retinopathy.      Obesity,       Psychiatric/Substance Use:     (+) psychiatric history 1       Infectious Disease:       Malignancy:       Other:            Physical Exam    Airway  airway exam normal           Respiratory Devices and Support         Dental       (+) Modest Abnormalities - crowns, retainers, 1 or 2 missing teeth      Cardiovascular   cardiovascular exam normal          Pulmonary   pulmonary exam normal                OUTSIDE LABS:  CBC:   Lab Results   Component Value Date    WBC 5.2 09/15/2023    WBC 5.7 08/01/2023    HGB 9.6 (L) 09/15/2023    HGB 9.2 (L) 08/01/2023    HCT 29.6 (L) 09/15/2023    HCT 28.8 (L) 08/01/2023     09/15/2023     08/01/2023     BMP:   Lab Results   Component Value Date     (L) 09/15/2023     (L) 08/01/2023    POTASSIUM 5.1 09/15/2023    POTASSIUM 4.3 08/01/2023    CHLORIDE 97 (L) 09/15/2023    CHLORIDE 95 (L) 08/01/2023    CO2 24 09/15/2023    CO2 25 08/01/2023    BUN 12.1 09/15/2023    BUN 11.3 08/01/2023    CR 0.74 09/15/2023    CR 0.78 08/01/2023     (H) 10/02/2023     (H) 09/15/2023     COAGS: No results found for: PTT, INR, FIBR  POC:   Lab Results   Component Value Date     (H) 06/22/2020     HEPATIC:   Lab Results   Component Value Date    ALBUMIN 4.1 09/15/2023    PROTTOTAL 6.9 09/15/2023    ALT 13 09/15/2023    AST 18 09/15/2023    ALKPHOS 71 09/15/2023    BILITOTAL 0.2 09/15/2023     OTHER:   Lab Results   Component Value Date    A1C 7.7 (H) 09/15/2023    NICOLÁS 9.4 09/15/2023    PHOS 4.8 (H) 02/03/2015    MAG 1.9 08/24/2020    TSH 1.44 08/24/2020    T4 1.08 08/08/2018       Anesthesia Plan    ASA Status:  3    NPO Status:  NPO Appropriate    Anesthesia Type: Spinal.   Induction: Intravenous.    Maintenance: TIVA.        Consents    Anesthesia Plan(s) and associated risks, benefits, and realistic alternatives discussed. Questions answered and patient/representative(s) expressed understanding.     - Discussed: Risks, Benefits and Alternatives for BOTH SEDATION and the PROCEDURE were discussed     - Discussed with:  Patient            Postoperative Care    Pain management: Oral pain medications, Peripheral nerve block (Single Shot), Multi-modal analgesia.   PONV prophylaxis: Ondansetron (or other 5HT-3)     Comments:                Weston Davis MD, MD

## 2023-10-02 NOTE — PROGRESS NOTES
Compliance form sent to Dialysis Unit    Catalina Painter MA     [facility] Care Coordination Contact    Per CC mail list of PCA providers to member saved in file.    Serenity Huntley  Care Management Specialist  [facility]  [phone]

## 2023-10-02 NOTE — ANESTHESIA CARE TRANSFER NOTE
Patient: Holly Muir    Procedure: Procedure(s):  ARTHROPLASTY, RIGHT KNEE, TOTAL       Diagnosis: Primary localized osteoarthritis of right knee [M17.11]  Diagnosis Additional Information: No value filed.    Anesthesia Type:   MAC, Spinal     Note:    Oropharynx: spontaneously breathing and oropharynx clear of all foreign objects  Level of Consciousness: awake  Oxygen Supplementation: room air    Independent Airway: airway patency satisfactory and stable  Dentition: dentition unchanged  Vital Signs Stable: post-procedure vital signs reviewed and stable  Report to RN Given: handoff report given  Patient transferred to: PACU    Handoff Report: Identifed the Patient, Identified the Reponsible Provider, Reviewed the pertinent medical history, Discussed the surgical course, Reviewed Intra-OP anesthesia mangement and issues during anesthesia, Set expectations for post-procedure period and Allowed opportunity for questions and acknowledgement of understanding      Vitals:  Vitals Value Taken Time   /72 10/02/23 1452   Temp     Pulse 72 10/02/23 1457   Resp 8 10/02/23 1457   SpO2 98 % 10/02/23 1457   Vitals shown include unvalidated device data.    Electronically Signed By: KASIE Riley CRNA  October 2, 2023  2:58 PM

## 2023-10-03 ENCOUNTER — APPOINTMENT (OUTPATIENT)
Dept: PHYSICAL THERAPY | Facility: CLINIC | Age: 68
End: 2023-10-03
Attending: ORTHOPAEDIC SURGERY
Payer: COMMERCIAL

## 2023-10-03 ENCOUNTER — APPOINTMENT (OUTPATIENT)
Dept: OCCUPATIONAL THERAPY | Facility: CLINIC | Age: 68
End: 2023-10-03
Attending: ORTHOPAEDIC SURGERY
Payer: COMMERCIAL

## 2023-10-03 VITALS
BODY MASS INDEX: 34.28 KG/M2 | WEIGHT: 186.29 LBS | HEART RATE: 70 BPM | DIASTOLIC BLOOD PRESSURE: 54 MMHG | RESPIRATION RATE: 16 BRPM | SYSTOLIC BLOOD PRESSURE: 108 MMHG | OXYGEN SATURATION: 97 % | HEIGHT: 62 IN | TEMPERATURE: 98.7 F

## 2023-10-03 LAB
ANION GAP SERPL CALCULATED.3IONS-SCNC: 13 MMOL/L (ref 7–15)
BUN SERPL-MCNC: 12 MG/DL (ref 8–23)
CALCIUM SERPL-MCNC: 8.6 MG/DL (ref 8.8–10.2)
CHLORIDE SERPL-SCNC: 93 MMOL/L (ref 98–107)
CREAT SERPL-MCNC: 0.75 MG/DL (ref 0.51–0.95)
DEPRECATED HCO3 PLAS-SCNC: 22 MMOL/L (ref 22–29)
EGFRCR SERPLBLD CKD-EPI 2021: 87 ML/MIN/1.73M2
GLUCOSE BLDC GLUCOMTR-MCNC: 198 MG/DL (ref 70–99)
GLUCOSE BLDC GLUCOMTR-MCNC: 205 MG/DL (ref 70–99)
GLUCOSE BLDC GLUCOMTR-MCNC: 253 MG/DL (ref 70–99)
GLUCOSE BLDC GLUCOMTR-MCNC: 339 MG/DL (ref 70–99)
GLUCOSE SERPL-MCNC: 208 MG/DL (ref 70–99)
HGB BLD-MCNC: 9.1 G/DL (ref 11.7–15.7)
POTASSIUM SERPL-SCNC: 4.3 MMOL/L (ref 3.4–5.3)
SODIUM SERPL-SCNC: 128 MMOL/L (ref 135–145)

## 2023-10-03 PROCEDURE — 99214 OFFICE O/P EST MOD 30 MIN: CPT | Performed by: INTERNAL MEDICINE

## 2023-10-03 PROCEDURE — 250N000013 HC RX MED GY IP 250 OP 250 PS 637

## 2023-10-03 PROCEDURE — 250N000013 HC RX MED GY IP 250 OP 250 PS 637: Performed by: PHYSICIAN ASSISTANT

## 2023-10-03 PROCEDURE — 97535 SELF CARE MNGMENT TRAINING: CPT | Mod: GO

## 2023-10-03 PROCEDURE — 97161 PT EVAL LOW COMPLEX 20 MIN: CPT | Mod: GP | Performed by: PHYSICAL THERAPIST

## 2023-10-03 PROCEDURE — 97116 GAIT TRAINING THERAPY: CPT | Mod: GP | Performed by: PHYSICAL THERAPIST

## 2023-10-03 PROCEDURE — 85018 HEMOGLOBIN: CPT

## 2023-10-03 PROCEDURE — 80048 BASIC METABOLIC PNL TOTAL CA: CPT

## 2023-10-03 PROCEDURE — 36415 COLL VENOUS BLD VENIPUNCTURE: CPT

## 2023-10-03 PROCEDURE — 97530 THERAPEUTIC ACTIVITIES: CPT | Mod: GP | Performed by: PHYSICAL THERAPIST

## 2023-10-03 PROCEDURE — 97165 OT EVAL LOW COMPLEX 30 MIN: CPT | Mod: GO

## 2023-10-03 PROCEDURE — 250N000012 HC RX MED GY IP 250 OP 636 PS 637

## 2023-10-03 PROCEDURE — 250N000011 HC RX IP 250 OP 636: Mod: JZ

## 2023-10-03 PROCEDURE — 82962 GLUCOSE BLOOD TEST: CPT

## 2023-10-03 RX ORDER — AMOXICILLIN 250 MG
1 CAPSULE ORAL 2 TIMES DAILY PRN
Qty: 14 TABLET | Refills: 0 | Status: SHIPPED | OUTPATIENT
Start: 2023-10-03 | End: 2023-10-10

## 2023-10-03 RX ORDER — TIZANIDINE 2 MG/1
TABLET ORAL
Qty: 30 TABLET | Refills: 0 | Status: SHIPPED | OUTPATIENT
Start: 2023-10-03 | End: 2023-12-01

## 2023-10-03 RX ORDER — POLYETHYLENE GLYCOL 3350 17 G/17G
17 POWDER, FOR SOLUTION ORAL DAILY
Qty: 7 PACKET | Refills: 0 | Status: SHIPPED | OUTPATIENT
Start: 2023-10-03 | End: 2024-02-07

## 2023-10-03 RX ORDER — OXYCODONE HYDROCHLORIDE 5 MG/1
5-10 TABLET ORAL EVERY 4 HOURS PRN
Qty: 33 TABLET | Refills: 0 | Status: SHIPPED | OUTPATIENT
Start: 2023-10-03 | End: 2023-11-06

## 2023-10-03 RX ORDER — ACETAMINOPHEN 325 MG/1
650 TABLET ORAL EVERY 4 HOURS PRN
Qty: 100 TABLET | Refills: 0 | Status: SHIPPED | OUTPATIENT
Start: 2023-10-05

## 2023-10-03 RX ADMIN — METHOCARBAMOL 500 MG: 500 TABLET ORAL at 11:48

## 2023-10-03 RX ADMIN — GABAPENTIN 1200 MG: 600 TABLET, FILM COATED ORAL at 14:24

## 2023-10-03 RX ADMIN — GABAPENTIN 1200 MG: 600 TABLET, FILM COATED ORAL at 08:00

## 2023-10-03 RX ADMIN — BUSPIRONE HYDROCHLORIDE 30 MG: 15 TABLET ORAL at 08:00

## 2023-10-03 RX ADMIN — LISINOPRIL 20 MG: 20 TABLET ORAL at 08:00

## 2023-10-03 RX ADMIN — PANTOPRAZOLE SODIUM 40 MG: 40 TABLET, DELAYED RELEASE ORAL at 07:01

## 2023-10-03 RX ADMIN — CEFAZOLIN SODIUM 2 G: 2 INJECTION, SOLUTION INTRAVENOUS at 04:18

## 2023-10-03 RX ADMIN — SENNOSIDES AND DOCUSATE SODIUM 1 TABLET: 50; 8.6 TABLET ORAL at 08:00

## 2023-10-03 RX ADMIN — INSULIN ASPART 3 UNITS: 100 INJECTION, SOLUTION INTRAVENOUS; SUBCUTANEOUS at 17:28

## 2023-10-03 RX ADMIN — OXYCODONE HYDROCHLORIDE 10 MG: 10 TABLET ORAL at 06:20

## 2023-10-03 RX ADMIN — OXYCODONE HYDROCHLORIDE 5 MG: 5 TABLET ORAL at 10:29

## 2023-10-03 RX ADMIN — ACETAMINOPHEN 975 MG: 325 TABLET, FILM COATED ORAL at 04:17

## 2023-10-03 RX ADMIN — ACETAMINOPHEN 975 MG: 325 TABLET, FILM COATED ORAL at 11:48

## 2023-10-03 RX ADMIN — HYDROMORPHONE HYDROCHLORIDE 0.2 MG: 1 INJECTION, SOLUTION INTRAMUSCULAR; INTRAVENOUS; SUBCUTANEOUS at 15:29

## 2023-10-03 RX ADMIN — FERROUS GLUCONATE 324 MG: 324 TABLET ORAL at 07:59

## 2023-10-03 RX ADMIN — OXYCODONE HYDROCHLORIDE 5 MG: 5 TABLET ORAL at 14:43

## 2023-10-03 RX ADMIN — PROPRANOLOL HYDROCHLORIDE 80 MG: 80 CAPSULE, EXTENDED RELEASE ORAL at 07:59

## 2023-10-03 RX ADMIN — METHOCARBAMOL 500 MG: 500 TABLET ORAL at 16:41

## 2023-10-03 RX ADMIN — INSULIN ASPART 4 UNITS: 100 INJECTION, SOLUTION INTRAVENOUS; SUBCUTANEOUS at 13:25

## 2023-10-03 RX ADMIN — METHOCARBAMOL 500 MG: 500 TABLET ORAL at 07:59

## 2023-10-03 RX ADMIN — INSULIN ASPART 2 UNITS: 100 INJECTION, SOLUTION INTRAVENOUS; SUBCUTANEOUS at 08:48

## 2023-10-03 RX ADMIN — APIXABAN 5 MG: 5 TABLET, FILM COATED ORAL at 10:28

## 2023-10-03 RX ADMIN — POLYETHYLENE GLYCOL 3350 17 G: 17 POWDER, FOR SOLUTION ORAL at 07:59

## 2023-10-03 ASSESSMENT — ACTIVITIES OF DAILY LIVING (ADL)
ADLS_ACUITY_SCORE: 31
ADLS_ACUITY_SCORE: 31
ADLS_ACUITY_SCORE: 27
ADLS_ACUITY_SCORE: 31
ADLS_ACUITY_SCORE: 27
ADLS_ACUITY_SCORE: 27
ADLS_ACUITY_SCORE: 31
ADLS_ACUITY_SCORE: 31
ADLS_ACUITY_SCORE: 27
ADLS_ACUITY_SCORE: 27

## 2023-10-03 NOTE — PLAN OF CARE
"  VS: BP (!) 149/68 (BP Location: Left arm)   Pulse 76   Temp 98.5  F (36.9  C) (Oral)   Resp 16   Ht 1.575 m (5' 2\")   Wt 84.5 kg (186 lb 4.6 oz)   SpO2 94%   BMI 34.07 kg/m      O2: To room air, denies SOB or dyspnea   Output: Attached to purewick   Last BM: With colostomy bag on left abd, with small amount of soft brown stool.   Activity: Can move independently on bed   Skin: Multiple bruises on the abd, surgical wound on (R) knee, BHARTI   Pain: Recurrent moderate to severe pain and muscle spasm, on PRN oxycodone 10 mg and scheduled methocarbamol.    CMS: A&Ox4, with intermittent numbness on bilateral feet, pt's baseline   Dressing: Alginate and tegaderm dressing covered with ace wrap, CDI   Diet: Regular diet, fluid restriction to 2L    LDA: Saline lock PIV on (R) dorsal hand, Colostomy bag on left abd, surgical wound on right knee with dressing CDI   Equipment: Personal items, call light, IV pole   Plan: Pending assessment from PT, anticipating discharge either to home or TCU   Additional Info: Ringer lactate infusion discontinued as pt tolerating food intake. Stat lactic acid was taken and with 1.3 result. On glucose monitoring, latest blood glucose checked was 332, due insulin administered. Pt uses hearing aids but left at home, can adequately hear.     "

## 2023-10-03 NOTE — PROGRESS NOTES
"VS: /50   Pulse 62   Temp 98.2  F (36.8  C)   Resp 16   Ht 1.575 m (5' 2\")   Wt 84.5 kg (186 lb 4.6 oz)   SpO2 96%   BMI 34.07 kg/m       O2: To room air, denies SOB or dyspnea   Output: Ambulates to BPRs, purewick at night.   Last BM: 10/3 With colostomy bag on left lower abd, with small amount of soft brown stool. She changed this today at 1200.   Activity: Can move independently on bed. SBA GB and walker.   Skin: Multiple bruises on the abd, surgical wound on (R) knee, BHARTI   Pain: Recurrent moderate to severe pain and muscle spasm, on PRN oxycodone 5 mg and scheduled methocarbamol and ice. Rating pain 4/10   CMS: A&Ox4, with intermittent numbness on bilateral feet, pt's baseline   Dressing: Alginate and tegaderm dressing, provider removed ACE, CDI   Diet: Regular diet, fluid restriction to 2L    LDA: Saline lock PIV on (R) dorsal hand, Colostomy bag on left abd, surgical wound on right knee with dressing CDI   Equipment: Personal items, call light, IV pole   Plan: Pending assessment from PT, anticipating discharge 10/4 either to home or TCU   Additional Info: latest blood glucose checked was 339, due insulin administered. Pt uses hearing aids but left at home, can adequately hear.     "

## 2023-10-03 NOTE — PROGRESS NOTES
[facility]  OUTPATIENT OCCUPATIONAL THERAPY  EVALUATION  PLAN OF TREATMENT FOR OUTPATIENT REHABILITATION  (COMPLETE FOR INITIAL CLAIMS ONLY)  Patient's Last Name, First Name, M.I.  YOB: 1955  Holly Muir                          Provider's Name  [facility] Medical Record No.  [id number]                             Onset Date:  10/02/23   Start of Care Date:  10/03/23   Type:     ___PT   _X_OT   ___SLP Medical Diagnosis:  R TKA                    OT Diagnosis:  Impaired ADL participation Visits from SOC:  1     See note for plan of treatment, functional goals and certification details    I CERTIFY THE NEED FOR THESE SERVICES FURNISHED UNDER        THIS PLAN OF TREATMENT AND WHILE UNDER MY CARE     (Physician co-signature of this document indicates review and certification of the therapy plan).                 10/03/23 1049   Appointment Info   Signing Clinician's Name / Credentials (OT) Emily MCDERMOTT   Student Supervision Therapy services provided with the co-signing licensed therapist guiding and directing the services, and providing the skilled judgement and assessment throughout the session   Rehab Comments (OT) CGA - SBA with FWW   Living Environment   People in Home child(ervin), adult   Current Living Arrangements house   Number of Stairs, Within Home, Primary seven   Transportation Anticipated family or friend will provide   Living Environment Comments lives in a multiple story home. with ball needs in the bottom floor. tub shower with tub bench, raised toilet, no grab bars.   Self-Care   Usual Activity Tolerance moderate   Current Activity Tolerance fair   Equipment Currently Used at Home cane, straight;walker, standard   Fall history within last six months yes   Activity/Exercise/Self-Care Comment only able to walk household distances, daughter helps with all IADLs and assists with ADLs at baseline.   Instrumental Activities  of Daily Living (IADL)   IADL Comments daughter will provide assistance with IADLs   General Information   Onset of Illness/Injury or Date of Surgery 10/02/23   Referring Physician Daniella Johnson, SAFIA   Patient/Family Therapy Goal Statement (OT) LB dressing, getting more ind and not having to rely on daughter   Additional Occupational Profile Info/Pertinent History of Current Problem R TKA   Right Lower Extremity (Weight-bearing Status) weight-bearing as tolerated (WBAT)   Cognitive Status Examination   Orientation Status orientation to person, place and time   Visual Perception   Visual Impairment/Limitations WNL   Sensory   Sensory Quick Adds not tested   Pain Assessment   Patient Currently in Pain Yes, see Vital Sign flowsheet   Coordination   Upper Extremity Coordination   (BUE AROM impacted by tremors)   Coordination Comments slight tremors noted with movement   Bed Mobility   Bed Mobility supine-sit   Assistive Device (Bed Mobility) bed rails   Comment (Bed Mobility) with SBA   Transfers   Transfers sit-stand transfer   Transfer Comments CGA with FWW   Sit-Stand Transfer   Sit-Stand Stearns (Transfers) contact guard   Assistive Device (Sit-Stand Transfers) walker, standard   Activities of Daily Living   BADL Assessment/Intervention bathing;lower body dressing;toileting   Bathing Assessment/Intervention   Stearns Level (Bathing) moderate assist (50% patient effort)   Assistive Devices (Bathing) tub bench   Lower Body Dressing Assessment/Training   Comment, (Lower Body Dressing) requires daughters assistance for LB dressing   Stearns Level (Lower Body Dressing) moderate assist (50% patient effort)   Toileting   Assistive Devices (Toileting) raised toilet seat   Stearns Level (Toileting) contact guard assist   Clinical Impression   Criteria for Skilled Therapeutic Interventions Met (OT) Yes, treatment indicated   OT Diagnosis Impaired ADL participation   OT Problem List-Impairments  impacting ADL pain   Assessment of Occupational Performance 1-3 Performance Deficits   Planned Therapy Interventions (OT) ADL retraining   Clinical Decision Making Complexity (OT) low complexity   Anticipated Equipment Needs Upon Discharge (OT) cane, straight;tub bench;walker, standard   Clinical Impression Comments Pt. requires OT to learn to use AE to participate safely with ADL tasks   OT Total Evaluation Time   OT Eval, Low Complexity Minutes (18672) 8   Therapy Certification   Start of Care Date 10/03/23   Certification date from 10/03/23   Certification date to 10/03/23   Medical Diagnosis R TKA   OT Goals   Therapy Frequency (OT) One time eval and treatment   OT Predicted Duration/Target Date for Goal Attainment 10/03/23   OT Goals Lower Body Dressing;Lower Body Bathing;Toilet Transfer/Toileting   OT: Lower Body Dressing Modified independent;Goal Met   OT: Lower Body Bathing Modified independent;Completed   OT: Toilet Transfer/Toileting Modified independent;Goal Met   Interventions   Interventions Quick Adds Self-Care/Home Management   OT Discharge Planning   OT Plan dc   OT Discharge Recommendation (DC Rec)   (defer to ortho)   OT Rationale for DC Rec Pt. is at baseline, moves well with FWW, she has good support from daughter while at home and has necessary home adaptations to remain safe while at home.   OT Brief overview of current status CGA-SBA with FWW   Total Session Time   Timed Code Treatment Minutes 20   Total Session Time (sum of timed and untimed services) 28

## 2023-10-03 NOTE — PLAN OF CARE
Goal Outcome Evaluation:      Plan of Care Reviewed With: patient    Overall Patient Progress: improvingOverall Patient Progress: improving    Outcome Evaluation: awake and alert, beginning PT

## 2023-10-03 NOTE — PROGRESS NOTES
[facility] - Internal Medicine Daily Note   Date of Service: 10/3/2023  Patient: Holly Muir  MRN: [id number]  Admission Date: 10/2/2023  Hospital Day # 0    Assessment & Plan    Holly Muir is a 68 year old female admitted on 10/2/2023 for a right TKA. She has a past medical history of B/L knee pain, OA of the knee, B/L cataracts, cervical cancer, colostomy, KONSTANTIN, CKD stage IIIa, diabetes type 2,  diabetic retinopathy, inflammatory arthritis, memory loss, neuropathy, HLD, HTN, urinary retention and incontinence, ISABELA, MDD, schizoaffective disorder, GERD, PE, and RLS.      S/P right TKA , post op day 1  Post operative pain and is well controlled with current medications  Right knee OA:  EBL of 25.   - Per primary team, Orthopedics  Activity: Up with assist and assistive devices as needed until independent. Posterior hip precautions to operative side x 3 months:  1) No hip flexion beyond 90 degrees  2) No adduction beyond midline  3) No internal rotation beyond neutral   Weight bearing status: WBAT  Antibiotics: Cefazolin x 24 hours  Diet: Begin with clear fluids and progress diet as tolerated. Bowel regimen. Anti-emetics PRN.    DVT prophylaxis: Resume PTA Eliquis on POD 1   Elevation: Elevate heels off of bed on pillows   Wound Care: Tegaderm and alginate x 2 weeks   Drains: none  Pain management: Orals PRN, IV for breakthrough only  X-rays: AP Pelvis and Lateral operative hip in PACU.   Physical Therapy: Mobilization, ROM, ADL's, Posterior hip precautions.    Occupational Therapy: ADL's  Labs: Trend Hgb on POD #1, 2  Consults: PT, OT. Hospitalist, appreciate assistance in caring for this patient throughout the perioperative period     Type 2 diabetes (A1c of 7.7 on 9/15/23)  Diabetic retinopathy  Diabetic neuropathy: BG trends as below.  Had been taking lantus 20 units over the past week, but usually at 30-35 units. Per last pharmacy note on 8/9/23: Lantus (gives around 9pm) 30-35 units; if blood  sugar >/=200 taking 35 units if blood sugar <200 taking 30 units. Novolog sliding scale-does not have a scale that she follows   metformin ER 1000 mg in the morning, 500 mg at night, glimepiride 4 mg 2 times daily  pioglitazone 45 mg once daily  - Continue PTA lantus 30 units at bedtime given holding PO agents   - Medium sliding scale insulin   - HOLD glimepiride 4 mg BID, metformin 1000 mg, Actos 45 mg daily restart post-op as able  - Hypoglycemic protocol            Recent Labs   Lab 10/02/23  1639 10/02/23  1506 10/02/23  1358 10/02/23  1012   * 182* 180* 190*         OUD  - Agree with pre-op eval recommending IP pain service   - Continue PTA gabapentin 1,200 mg BID     HLD  HTN: Mildly hypertensive after surgery  - Continue lisinopril 20 mg for formulary exchange for PTA ramipril 5 mg daily with hold parameters, simvastatin 20 mg at bedtime     Hx of PE, provoked (5/7/23) without evidence of LE DVT: Had been on apixaban 5 mg PO BID until 2 monthhs prior to surgery.   - Agree with restart of Eliquis per primary team above  - Will need to be on AC for 2 months after right TKA     S/P partial colectomy with colostomy creation (2002):   - Consider WOCN consult  - Pt to bring home supplies  - Monitor      GERD: Exchange PTA omprazole 40 mg daily for protonix 40 mg daily      Acute on chronic Chronic anemia d/t perioperative blood loss: Combination of KONSTANTIN and anemia of chronic disease. Baseline Hgb ~9.2-10.   - Agree with POD 1 trend of Hgb  - Continue PTA ferrous gluconate     ISABELA  MDD  OCD  Schizoaffective disorder   -Continue PTA Abilify, Wellbutrin, Buspar, Paxil     RLS: Continue PTA requip 0.25 mg at bedtime     Insomnia: Continue PTA trazodone 75 mg at bedtime      Hyponatremia, chronic: See trends below.  - Trend BMP in AM  - Continue 2L FR       Dain Santoro MD  Internal Medicine Staff Hospitalist   [facility]   Pager: [phone]    Team: Emily Lara 19  Page Cross Cover after  5 pm: pager [phone]   ___________________________________________________________________    Subjective & Interval Hx:    Patient seen and examined.  Patient resting comfortably.  Denies any dizziness, shortness of breath.  No nausea.  Walked with PT and OT.  Vital signs stable.  Postoperative hemoglobin 9.1 today.  No any ongoing discharge disposition is home, medically stable for discharge.    Last 24 hr care team notes reviewed.   ROS:  4 point ROS including Respiratory, CV, GI and , other than that noted in the HPI, is negative.    Medications: Reviewed in EPIC. List below for reference    Current Facility-Administered Medications:     [START ON 10/5/2023] acetaminophen (TYLENOL) tablet 650 mg, 650 mg, Oral, Q4H PRN, Daniella Johnson PA-C    acetaminophen (TYLENOL) tablet 975 mg, 975 mg, Oral, Q8H, Daniella Johnson PA-C, 975 mg at 10/03/23 1148    apixaban ANTICOAGULANT (ELIQUIS) tablet 5 mg, 5 mg, Oral, BID, Kelly Chau PA-C, 5 mg at 10/03/23 1028    ARIPiprazole (ABILIFY) tablet 20 mg, 20 mg, Oral, QPM, MihirParis oneal APRN CNP, 20 mg at 10/02/23 1932    benzocaine-menthol (CHLORASEPTIC) 6-10 MG lozenge 1 lozenge, 1 lozenge, Buccal, Q1H PRN, Daniella Johnson PA-C    bisacodyl (DULCOLAX) suppository 10 mg, 10 mg, Rectal, Daily PRN, Daniella Johnson PA-C    buPROPion (WELLBUTRIN SR) 12 hr tablet 200 mg, 200 mg, Oral, QPM, MihirParis oneal APRN CNP, 200 mg at 10/02/23 1930    busPIRone (BUSPAR) tablet 30 mg, 30 mg, Oral, BID, MihirParis APRN CNP, 30 mg at 10/03/23 0800    glucose gel 15-30 g, 15-30 g, Oral, Q15 Min PRN **OR** dextrose 50 % injection 25-50 mL, 25-50 mL, Intravenous, Q15 Min PRN **OR** glucagon injection 1 mg, 1 mg, Subcutaneous, Q15 Min PRN, Paris Ash APRN CNP    ferrous gluconate (FERGON) tablet 324 mg, 324 mg, Oral, Daily with breakfast, Paris Ash APRN CNP, 324 mg at 10/03/23 0759    gabapentin (NEURONTIN) tablet 1,200 mg, 1,200 mg, Oral, TID,  Paris Ash APRN CNP, 1,200 mg at 10/03/23 1424    HYDROmorphone (PF) (DILAUDID) injection 0.2 mg, 0.2 mg, Intravenous, Q2H PRN **OR** HYDROmorphone (PF) (DILAUDID) injection 0.4 mg, 0.4 mg, Intravenous, Q2H PRN, Daniella Johnson PA-C    insulin aspart (NovoLOG) injection (RAPID ACTING), 1-7 Units, Subcutaneous, TID AC, Paris Ash APRN CNP, 4 Units at 10/03/23 1325    insulin aspart (NovoLOG) injection (RAPID ACTING), 1-5 Units, Subcutaneous, At Bedtime, Paris Ash APRN CNP, 3 Units at 10/02/23 2207    insulin glargine (LANTUS PEN) injection 30 Units, 30 Units, Subcutaneous, At Bedtime, Paris Ash APRN CNP, 30 Units at 10/02/23 2209    lactated ringers infusion, , Intravenous, Continuous, Daniella Johnson PA-C, Stopped at 10/02/23 1913    lidocaine (LMX4) cream, , Topical, Q1H PRN, Daniella Johnson PA-C    lidocaine 1 % 0.1-1 mL, 0.1-1 mL, Other, Q1H PRN, Daniella Johnson PA-C    lisinopril (ZESTRIL) tablet 20 mg, 20 mg, Oral, Daily, Paris Ash APRN CNP, 20 mg at 10/03/23 0800    magnesium hydroxide (MILK OF MAGNESIA) suspension 30 mL, 30 mL, Oral, Daily PRN, Daniella Johnson PA-C    methocarbamol (ROBAXIN) tablet 500 mg, 500 mg, Oral, 4x Daily, Zoey Nguyen MD, 500 mg at 10/03/23 1148    miconazole (MICATIN) 2 % powder 1 g, 1 applicator, Topical, TID PRN, Paris Ash APRN CNP    naloxone (NARCAN) injection 0.2 mg, 0.2 mg, Intravenous, Q2 Min PRN **OR** naloxone (NARCAN) injection 0.4 mg, 0.4 mg, Intravenous, Q2 Min PRN **OR** naloxone (NARCAN) injection 0.2 mg, 0.2 mg, Intramuscular, Q2 Min PRN **OR** naloxone (NARCAN) injection 0.4 mg, 0.4 mg, Intramuscular, Q2 Min PRN, Sanford Sheehan MD    ondansetron (ZOFRAN ODT) ODT tab 4 mg, 4 mg, Oral, Q6H PRN **OR** ondansetron (ZOFRAN) injection 4 mg, 4 mg, Intravenous, Q6H PRN, Daniella Johnson PA-C    oxyCODONE (ROXICODONE) tablet 5 mg, 5 mg, Oral, Q4H PRN, 5 mg at 10/03/23 1443 **OR** oxyCODONE IR (ROXICODONE)  "tablet 10 mg, 10 mg, Oral, Q4H PRN, Daniella Johnson PA-C, 10 mg at 10/03/23 0620    pantoprazole (PROTONIX) EC tablet 40 mg, 40 mg, Oral, QAM AC, Paris Ash APRN CNP, 40 mg at 10/03/23 0701    PARoxetine (PAXIL) tablet 40 mg, 40 mg, Oral, QPM, Paris Ash APRN CNP, 40 mg at 10/02/23 1930    polyethylene glycol (MIRALAX) Packet 17 g, 17 g, Oral, Daily, Daniella Johnson PA-C, 17 g at 10/03/23 0759    prochlorperazine (COMPAZINE) injection 5 mg, 5 mg, Intravenous, Q6H PRN **OR** prochlorperazine (COMPAZINE) tablet 5 mg, 5 mg, Oral, Q6H PRN, Daniella Johnson PA-C    propranolol ER (INDERAL LA) 24 hr capsule 80 mg, 80 mg, Oral, Daily, Paris Ash APRN CNP, 80 mg at 10/03/23 0759    rOPINIRole (REQUIP) tablet 0.25 mg, 0.25 mg, Oral, QPM, Paris Ash APRN CNP, 0.25 mg at 10/02/23 1931    senna-docusate (SENOKOT-S/PERICOLACE) 8.6-50 MG per tablet 1 tablet, 1 tablet, Oral, BID, Daniella Johnson PA-C, 1 tablet at 10/03/23 0800    simvastatin (ZOCOR) tablet 20 mg, 20 mg, Oral, At Bedtime, Paris Ash APRN CNP, 20 mg at 10/02/23 2204    sodium chloride (PF) 0.9% PF flush 3 mL, 3 mL, Intracatheter, Q8H, Daniella Johnson PA-C, 3 mL at 10/03/23 1029    sodium chloride (PF) 0.9% PF flush 3 mL, 3 mL, Intracatheter, q1 min prn, Daniella Johnson PA-C    traZODone (DESYREL) half-tab 75 mg, 75 mg, Oral, At Bedtime, Paris Ash APRN CNP, 75 mg at 10/02/23 2204    Physical Exam:    /50   Pulse 62   Temp 98.2  F (36.8  C)   Resp 16   Ht 1.575 m (5' 2\")   Wt 84.5 kg (186 lb 4.6 oz)   SpO2 96%   BMI 34.07 kg/m       GENERAL: Alert and oriented x 3. NAD.   HEENT: Anicteric sclera. Mucous membranes moist.   CV: RRR. S1, S2. No murmurs appreciated.   RESPIRATORY: Effort normal on RA. Lungs CTAB with no wheezing, rales, rhonchi.   GI: Abdomen soft and non distended with normoactive bowel sounds present in all quadrants. No tenderness, rebound, guarding.   NEUROLOGICAL: No focal " deficits. Moves all extremities.    EXTREMITIES: No peripheral edema. Intact bilateral pedal pulses.   SKIN: No jaundice. No rashes.     Labs & Studies of Note: I personally reviewed the following studies:  CBC RESULTS:   Recent Labs   Lab Test 10/03/23  0538 09/15/23  1143   WBC  --  5.2   RBC  --  3.58*   HGB 9.1* 9.6*   HCT  --  29.6*   MCV  --  83   MCH  --  26.8   MCHC  --  32.4   RDW  --  15.3*   PLT  --  356

## 2023-10-03 NOTE — PROGRESS NOTES
10/03/23 9262   Appointment Info   Signing Clinician's Name / Credentials (PT) JASPAL Hernandes   Student Supervision Therapy services provided with the co-signing licensed therapist guiding and directing the services, and providing the skilled judgement and assessment throughout the session;Direct supervision provided       Present no   Language English   Living Environment   People in Home child(ervin), adult;grandchild(ervin)   Current Living Arrangements house   Home Accessibility stairs to enter home;stairs within home   Number of Stairs, Main Entrance 2   Stair Railings, Main Entrance none   Number of Stairs, Within Home, Primary seven   Stair Railings, Within Home, Primary railing on left side (ascending)   Transportation Anticipated family or friend will provide   Living Environment Comments Lives in a house with daughter, son in law and 2 adult grandchildren. Daughter works from home and will be able to assist as needed. 2 ELANA (platform steps) and 7 to get up to bedroom (rail on L)   Self-Care   Usual Activity Tolerance moderate   Current Activity Tolerance fair  (not OOB yet)   Regular Exercise Yes   Activity/Exercise Type   (Prehab PT exercise)   Exercise Amount/Frequency daily   Equipment Currently Used at Home walker, standard;raised toilet seat;shower chair   Fall history within last six months yes   Number of times patient has fallen within last six months 1  (Pt reports leg fell asleep and upon standing she fell forward)   Activity/Exercise/Self-Care Comment Could only walk household distances, reports going up stairs was difficult. Daughter was helping with dressing and showering, would sometimes need her to assist with STS   General Information   Onset of Illness/Injury or Date of Surgery 10/02/23   Referring Physician Sanford Sheehan MD   Patient/Family Therapy Goals Statement (PT) Wants to walk and do steps   Pertinent History of Current Problem (include personal factors  and/or comorbidities that impact the POC) Per chart review Holly Muir is a 68 year old female admitted on 10/2/2023 for a right TKA. She has a past medical history of B/L knee pain, OA of the knee, B/L cataracts, cervical cancer, colostomy, KONSTANTIN, CKD stage IIIa, diabetes type 2,  diabetic retinopathy, inflammatory arthritis, memory loss, neuropathy, HLD, HTN, urinary retention and incontinence, ISABELA, MDD, schizoaffective disorder, GERD, PE, and RLS.   Existing Precautions/Restrictions fall;weight bearing   Weight-Bearing Status - LUE full weight-bearing   Weight-Bearing Status - RUE full weight-bearing   Weight-Bearing Status - LLE full weight-bearing   Weight-Bearing Status - RLE weight-bearing as tolerated   Heart Disease Risk Factors High blood pressure;Overweight;Diabetes   Cognition   Affect/Mental Status (Cognition) WNL;low arousal/lethargic  (in and out of sleep during supine exercises, needing to be aroused multiple times)   Orientation Status (Cognition) oriented x 4   Follows Commands (Cognition) WNL   Pain Assessment   Patient Currently in Pain Yes, see Vital Sign flowsheet   Integumentary/Edema   Integumentary/Edema other (describe)   Integumentary/Edema Comments Normal s/p TKA swelling, wound dressing in place covering incision   Posture    Posture Forward head position;Protracted shoulders   Range of Motion (ROM)   Range of Motion ROM deficits secondary to surgical procedure;ROM deficits secondary to pain;ROM deficits secondary to swelling   Strength (Manual Muscle Testing)   Strength (Manual Muscle Testing) Able to perform R SLR;No deficits observed during functional mobility   Bed Mobility   Comment, (Bed Mobility) SBA with supine<>sit with HOB elevated and use of 1 bed rail   Transfers   Comment, (Transfers) Transfers to FWW with SBA   Gait/Stairs (Locomotion)   Comment, (Gait/Stairs) Amb with FWW and CGA-SBA   Balance   Balance other (describe)   Balance Comments Ind sitting EOB balance,  demonstrated good static standing balance without support from FWW   Sensory Examination   Sensory Perception patient reports no sensory changes   Clinical Impression   Criteria for Skilled Therapeutic Intervention Yes, treatment indicated   PT Diagnosis (PT) impaired functional mobility   Influenced by the following impairments s/p TKA pain, swelling, and weakness   Functional limitations due to impairments bed mobility, transfers, amb, and stairs   Clinical Presentation (PT Evaluation Complexity) Stable/Uncomplicated   Clinical Presentation Rationale Per clinical judgement   Clinical Decision Making (Complexity) low complexity   Planned Therapy Interventions (PT) bed mobility training;gait training;home exercise program;stair training;transfer training;progressive activity/exercise   Anticipated Equipment Needs at Discharge (PT) other (see comments)  (None)   Risk & Benefits of therapy have been explained evaluation/treatment results reviewed;care plan/treatment goals reviewed;risks/benefits reviewed;current/potential barriers reviewed;participants voiced agreement with care plan;participants included;patient   PT Total Evaluation Time   PT Eval, Low Complexity Minutes (90651) 12   Plan of Care Review   Plan of Care Reviewed With patient   Therapy Certification   Start of care date 10/03/23   Certification date from 10/03/23   Certification date to 10/03/23   Medical Diagnosis s/p right TKA   Physical Therapy Goals   PT Frequency 2x/day   PT Predicted Duration/Target Date for Goal Attainment 10/03/23   PT Goals Bed Mobility;Transfers;Gait;Stairs;PT Goal 1   PT: Bed Mobility Supervision/stand-by assist;Supine to/from sit   PT: Transfers Supervision/stand-by assist;Assistive device   PT: Gait Supervision/stand-by assist;100 feet   PT: Stairs Supervision/stand-by assist;7 stairs;Rail on left   PT: Goal 1 Demonstrates and verbalizes understanding of TKA supine HEP   Interventions   Interventions Quick Adds Therapeutic  Activity;Therapeutic Procedure   Therapeutic Procedure/Exercise   Ther. Procedure: strength, endurance, ROM, flexibillity Minutes (42792) 10   Symptoms Noted During/After Treatment increased pain   Treatment Detail/Skilled Intervention Pt instructed on supine HEP completing 10 reps of bilat ankle pumps and GS. On R LE only, 10 reps of QS, HS, heel slides, and SAQs. Verbal and tactile cueing for muscle faciliatation given throughout. Pt verbalizes understanding of HEP reps and frequency.   Therapeutic Activity   Therapeutic Activities: dynamic activities to improve functional performance Minutes (47522) 16   Treatment Detail/Skilled Intervention See eval for level of assist with mobility. Following eval pt amb ~50 more ft with FWW and SBA. Maintains slowed, but steady pace with no LOB noted. Pt complaining of headache and requesting to turn around. Upon reutrn to room, pt wanting to sit up in chair. Chair prepped and pt takes a seat with SBA. Second chair and pillow used to elevate B LEs. Discussed doing a stair trial this PM and pt agreeable. Ended session with pt in chair, RN present in room.   PT Discharge Planning   PT Plan flat bed mobility, stair trial, progress amb tolerance   PT Discharge Recommendation (DC Rec)   (Defer to ortho)   PT Rationale for DC Rec Pending stair trial, no anticipate barriers for pt to discharge home with assist from daughter   PT Brief overview of current status SBA with FWW for all mobility   Total Session Time   Timed Code Treatment Minutes 26   Total Session Time (sum of timed and untimed services) 38     [facility]  OUTPATIENT PHYSICAL THERAPY EVALUATION  PLAN OF TREATMENT FOR OUTPATIENT REHABILITATION  (COMPLETE FOR INITIAL CLAIMS ONLY)  Patient's Last Name, First Name, M.I.  YOB: 1955  Holly Muir                        Provider's Name  [facility] Medical Record No.  [id number]                              Onset Date:  10/02/23   Start of Care Date:  (P) 10/03/23   Type:     _X_PT   ___OT   ___SLP Medical Diagnosis:  (P) s/p right TKA              PT Diagnosis:  impaired functional mobility Visits from SOC:  1     See note for plan of treatment, functional goals and certification details    I CERTIFY THE NEED FOR THESE SERVICES FURNISHED UNDER        THIS PLAN OF TREATMENT AND WHILE UNDER MY CARE     (Physician co-signature of this document indicates review and certification of the therapy plan).

## 2023-10-03 NOTE — PLAN OF CARE
DISCHARGE SUMMARY    Pt discharging to: Home   Transportation: Private transportation provided by family  AVS given and discussed: Daughter Cata  Stoplight Tool given and discussed: Daughter Cata   Medications given: Daughter Cata  Belongings returned: N/A  Comments: Patient requested to stay until insulin is given at 1700 hours.

## 2023-10-03 NOTE — PROGRESS NOTES
Orthopaedic Surgery Progress Note 10/03/2023    S: NAEON, AF, VSS. Pain reasonably well controlled. Elevated blood sugar to 332, resolved down to 205. Tolerating PO. No OOB yet.     O:  Temp: 98.5  F (36.9  C) Temp src: Oral BP: 133/56 Pulse: 68   Resp: 17 SpO2: 95 % O2 Device: None (Room air) Oxygen Delivery: 2 LPM    Exam:  Gen: No acute distress, resting comfortably in bed.  Resp: Non-labored breathing  MSK:  RLE:  - Dressings c/d/i  - SILT femoral/tibial/sural/saphenous/DP/SP nerves  - Fires Quad, TA, EHL, FHL, GaSC  - PT/DP pulses 2+, foot wwp      No lab results found in last 7 days.    Invalid input(s): SEDRATE      Assessment: Holly Muir is a 68 year old female s/p Procedure(s):  ARTHROPLASTY, RIGHT KNEE, TOTAL on 10/2/2023 with Dr. Sheehan.     Activity: Up with assist and assistive devices as needed until independent. Posterior hip precautions to operative side x 3 months:  1) No hip flexion beyond 90 degrees  2) No adduction beyond midline  3) No internal rotation beyond neutral   Weight bearing status: WBAT  Antibiotics: Cefazolin x 24 hours  Diet: Begin with clear fluids and progress diet as tolerated. Bowel regimen. Anti-emetics PRN.    DVT prophylaxis: Resume PTA Eliquis on POD 1   Elevation: Elevate heels off of bed on pillows   Wound Care: Tegaderm and alginate x 2 weeks   Drains: none  Pain management: Orals PRN, IV for breakthrough only  X-rays: AP Pelvis and Lateral operative hip in PACU.   Physical Therapy: Mobilization, ROM, ADL's, Posterior hip precautions.    Occupational Therapy: ADL's  Labs: Trend Hgb on POD #1, 2  Consults: PT, OT. Hospitalist, appreciate assistance in caring for this patient throughout the perioperative period  Follow-up:  As below  Future Appointments   Date Time Provider Department Center   10/3/2023  8:15 AM Daniella Fernandez, PT [facility] [place]   10/3/2023  9:45 AM Latanya Beaver OT UROT [place]   10/3/2023  1:30 PM Daniella Fernandez, PT [facility] [place]    10/6/2023 10:00 AM LAB FIRST FLOOR [facility] [place]   10/9/2023  2:00 PM Liz Calero, [facility] [place] [facility]   10/17/2023 11:00 AM MG NURSE ONLY ORTHO [facility] [place]   11/14/2023 11:20 AM Sanford Sheehan MD [facility] [place]   11/21/2023 10:15 AM Stacey Serrano, [facility]   12/12/2023 11:20 AM Sanford Sheehan MD [facility] [place]   1/10/2024 10:00 AM Kelly Yan PA-C [facility]       Disposition: Pending progress with therapies, pain control on orals, and medical stability, anticipate discharge to home on POD #1-2.    Sanford Saldana MD  Orthopaedic Surgery PGY-4

## 2023-10-03 NOTE — PLAN OF CARE
Physical Therapy Discharge Summary    Reason for therapy discharge:    Discharged to home with home therapy.  All goals and outcomes met, no further needs identified.    Progress towards therapy goal(s). See goals on Care Plan in [facility] electronic health record for goal details.  Goals met    Therapy recommendation(s):    Continue home exercise program.

## 2023-10-03 NOTE — PLAN OF CARE
"/56 (BP Location: Left arm)   Pulse 68   Temp 98.5  F (36.9  C) (Oral)   Resp 17   Ht 1.575 m (5' 2\")   Wt 84.5 kg (186 lb 4.6 oz)   SpO2 95%   BMI 34.07 kg/m    Pt is A & X 4. Vitals stable. Pt initially complained of pain related to restless leg but pain resolved after ice, scheduled tylenol and PRN Oxycodone 10 mg was administered and tolerated. Ice pack offered. Pt repositioned and made comfortable in bed, during rounds to room, pt appeared to have slept most of the night. Intake and output documentation in flowsheet. Scheduled I/V medication administered and tolerated. Surgical transparent wound dressing to right knew is clean dry and intact. Pt is able to communicate effectively, Call button placed within reach, will continue with POC.  "

## 2023-10-03 NOTE — PHARMACY-ADMISSION MEDICATION HISTORY
Pharmacist Admission Medication History    Admission medication history is complete. The information provided in this note is only as accurate as the sources available at the time of the update.    Medication reconciliation/reorder completed by provider prior to medication history? Yes    Information Source(s): Hospital records, care everywhere, fill history. Last doses provided by RN.    Pertinent Information: apixaban will need to be reordered to start AM of 10/3 as per Ortho note to start on POD1.    Changes made to PTA medication list:  Added:   Cetirizine  Deleted: None  Changed: None    Allergies reviewed with patient and updates made in EHR: no    Medication History Completed By: Joseph Zheng RPH 10/2/2023 7:33 PM    Prior to Admission medications    Medication Sig Last Dose Taking? Auth Provider Long Term End Date   ACETAMINOPHEN EXTRA STRENGTH 500 MG tablet TAKE 2 TABLETS BY MOUTH EVERY 8 HOURS. MAX ACETAMINOPHEN DOSE: 4000MG IN 24 HRS. Past Week Yes Tiffany King MD PhD     apixaban ANTICOAGULANT (ELIQUIS) 5 MG tablet Take 1 tablet (5 mg) by mouth 2 times daily 9/29/2023 at 0800 Yes Tiffany King MD PhD     ARIPiprazole (ABILIFY) 20 MG tablet Take 1 tablet by mouth every evening 10/1/2023 at 2100 Yes Reported, Patient No    blood glucose (NO BRAND SPECIFIED) test strip Use to test blood sugar 4 times daily or as directed. accu check guide strips 10/1/2023 Yes Elizabeth Medrano MD     blood glucose monitoring (NO BRAND SPECIFIED) meter device kit Use to test blood sugar 4  times daily or as directed. Accuceck Guide meter 10/1/2023 Yes Elizabeth Medrano MD     blood glucose monitoring (ONE TOUCH ULTRA 2) meter device kit USE TO TEST BLOOD SUGAR 4 TIMES DAILY OR AS DIRECTED. ACCUCECK GUIDE METER 10/1/2023 Yes Reported, Patient     blood glucose monitoring (ULTRA THIN 30G) lancets Use to test blood sugar 4  times daily or as directed.lancets for acchu check guide 10/1/2023 Yes Elizabeth Medrano MD     buPROPion  (WELLBUTRIN SR) 200 MG 12 hr tablet Take 1 tablet by mouth every evening 10/1/2023 at 2100 Yes Reported, Patient Yes    busPIRone HCl (BUSPAR) 30 MG tablet TAKE 1 TABLET BY MOUTH TWICE A DAY  Patient taking differently: 30 mg TAKE 1 TABLET BY MOUTH TWICE A DAY (0700 and 2100) 9/29/2023 Yes Tiffany King MD PhD Yes    cetirizine (ZYRTEC) 10 MG tablet Take 10 mg by mouth daily Unknown Yes Unknown, Entered By History     Continuous Blood Gluc  (FREESTYLE CM 2 READER) GALINA 1 Dose continuous 10/2/2023 Yes Elizabeth Medrano MD     Continuous Blood Gluc Sensor (FREESTYLE CM 2 SENSOR) MISC 1 Dose continuous 10/2/2023 Yes Elizabeth Medrano MD     [facility] ASPIRIN ADULT LOW DOSE 81 MG chewable tablet TAKE 1 TABLET BY MOUTH EVERY DAY 9/26/2023 Yes Tiffany King MD PhD     diclofenac (VOLTAREN) 1 % topical gel Apply 4 g topically 4 times daily Past Month Yes Tiffany King MD PhD     ferrous gluconate (FERGON) 324 (38 Fe) MG tablet Take 1 tablet (324 mg) by mouth daily (with breakfast) 9/26/2023 Yes Tiffany King MD PhD     glimepiride (AMARYL) 2 MG tablet Take 2 tablets (4 mg) by mouth 2 times daily (before meals) 9/29/2023 Yes Elizabeth Medrano MD Yes    insulin aspart (NOVOLOG FLEXPEN) 100 UNIT/ML pen Using as needed for high blood sugars More than a month Yes Reported, Patient No    insulin glargine (LANTUS SOLOSTAR) 100 UNIT/ML pen Inject 35-42 units at bedtime as directed + 2 units for priming of pen. Max dose 44 units.  Patient taking differently: 20 Units At Bedtime Inject 35-42 units at bedtime as directed + 2 units for priming of pen. Max dose 44 units. 10/1/2023 Yes Elizabeth Medrano MD Yes    insulin pen needle (31G X 5 MM) 31G X 5 MM miscellaneous Use four times 4 day times a day 10/1/2023 Yes Elizabeth Medrano MD     Multiple Vitamin (DAILY-ISAAC MULTIVITAMIN) TABS TAKE 1 TABLET BY MOUTH EVERY DAY 9/26/2023 Yes Tiffany King MD PhD     naloxone (NARCAN) 4 MG/0.1ML nasal spray Spray 1 spray (4 mg) into one nostril alternating nostrils  once as needed for opioid reversal every 2-3 minutes until assistance arrives Unknown Yes Tiffany King MD PhD Yes    nystatin (MYCOSTATIN) 471584 UNIT/GM external powder Apply 1 dose topically 3 times daily as needed Past Month Yes Tiffany King MD PhD     omeprazole (PRILOSEC) 20 MG DR capsule TAKE 2 CAPSULES (40 MG) BY MOUTH DAILY *TAKE 30-60 MINUTES BEFORE A MEAL* 9/29/2023 Yes Tiffany King MD PhD     oxyCODONE (ROXICODONE) 5 MG tablet Take 1 tablet (5 mg) by mouth every 6 hours as needed for moderate to severe pain 9/30/2023 Yes Tiffany King MD PhD     PARoxetine (PAXIL) 40 MG tablet Take 1 tablet by mouth every evening 9/29/2023 Yes Reported, Patient Yes    pioglitazone (ACTOS) 45 MG tablet Take 1 tablet (45 mg) by mouth daily 10/1/2023 at 2100 Yes Elizabeth Medrano MD Yes    propranolol ER (INDERAL LA) 80 MG 24 hr capsule TAKE 1 CAPSULE BY MOUTH EVERY DAY 10/1/2023 at 2100 Yes Tiffany King MD PhD Yes    ramipril (ALTACE) 5 MG capsule TAKE 1 CAPSULE BY MOUTH EVERY DAY 9/29/2023 Yes Tiffany King MD PhD Yes    rOPINIRole (REQUIP) 0.25 MG tablet TAKE 1 TABLET (0.25 MG) BY MOUTH EVERY EVENING 9/29/2023 Yes Lacie Harrington, APRN CNP Yes    simvastatin (ZOCOR) 20 MG tablet TAKE 1 TABLET BY MOUTH EVERYDAY AT BEDTIME 9/29/2023 Yes Wayne Karimi MD Yes    tiZANidine (ZANAFLEX) 2 MG tablet TAKE 1 TABLET BY MOUTH THREE TIMES A DAY AS NEEDED FOR MUSCLE SPASM 9/29/2023 Yes Tiffany King MD PhD     traZODone (DESYREL) 50 MG tablet Take 75 mg by mouth At Bedtime Unknown at unkown Yes Tiffany King MD PhD Yes    vitamin C ([facility] VITAMIN C) 500 MG tablet 1/2 TABLET BY MOUTH JACOBSON WITH IRON SUPPLEMENT BEFORE MEAL Past Month Yes Tiffany King MD PhD     VITAMIN D3 25 MCG (1000 UT) tablet TAKE 1 TABLET BY MOUTH EVERY DAY Past Month Yes Tiffany King MD PhD     gabapentin (NEURONTIN) 600 MG tablet TAKE 2 TABLETS (1,200 MG) BY MOUTH 3 TIMES DAILY FOR 30 DAYS 9/29/2023  Tiffany King MD PhD Yes    metFORMIN (GLUCOPHAGE XR) 500 MG 24 hr tablet Take two tablets  with breakfast and one tablet with dinner for one week then increase to two tablets with breakfast and two tablets with dinner thereafter  Patient taking differently: 1,000 mg 2 times daily (with meals) Take two tablets with breakfast and one tablet with dinner for one week then increase to two tablets with breakfast and two tablets with dinner thereafter 9/29/2023  Elizabeth Medrano MD Yes      Joseph Zheng, PharmD

## 2023-10-04 NOTE — DISCHARGE SUMMARY
[facility]  Orthopedic Surgery Discharge Summary     Date of Admission: 10/2/2023  Date of Discharge: 10/3/2023  6:02 PM  Disposition: Home  Staff Physician: Sanford Sheehan MD  Primary Care Provider: Tiffany King    ADMISSION DIAGNOSIS:  Primary localized osteoarthritis of right knee     DISCHARGE DIAGNOSIS:  Primary localized osteoarthritis of right knee     PROCEDURES: Procedure(s):  ARTHROPLASTY, RIGHT KNEE, TOTAL on 10/2/2023    BRIEF HISTORY:  Holly Muir is a 68 year old female s/p Procedure(s):  ARTHROPLASTY, RIGHT KNEE, TOTAL on 10/2/2023 with Dr. Sheehan.    HOSPITAL COURSE:    The patient was admitted following the above listed procedures for pain control and rehabilitation. Holly Muir did well post-operatively. Medicine was consulted post operatively to aid in management of medical co-morbidities. The patient received routine nursing cares and at the time of discharge was medically stable. Vital signs were stable throughout admission. The patient is tolerating a regular diet and is voiding spontaneously. All PT/OT goals have been met for safe mobility. Pain is now controlled on oral medications which will be available on discharge. Stool softeners have been used while taking pain medications to help prevent constipation. Holly Muir is deemed medically safe to discharge on POD1.     Antibiotics:  Ancef given periop and 24 hours postop.  DVT prophylaxis:  Mechanical prophylaxis initiated after surgery.   PT Progress:  Has met PT/OT goals for safe mobility.    Pain Meds:  Weaned off all IV pain meds by discharge.  Inpatient Events: No significant events or complications.     PHYSICAL EXAM:    Please see exam from orthopedic progress note on the day of discharge.     FOLLOWUP:    Follow up with Dr. Sheehan team at 2 weeks postoperatively.    Future Appointments   Date Time Provider Department Center   10/6/2023 10:00 AM LAB FIRST FLOOR [facility]   10/9/2023   2:00 PM [facility], [facility] [place] [facility]   10/17/2023 11:00 AM MG NURSE ONLY ORTHO [facility] [place]   11/14/2023 11:20 AM Sanford Sheehan MD [facility] [place]   11/21/2023 10:15 AM Stacey Serrano, [facility]   12/12/2023 11:20 AM Sanford Sheehan MD [facility] [place]   1/10/2024 10:00 AM Kelly Yan PA-C [facility]     Orthopedic surgery appointments are at the [facility] ([address] [place] [address]). Call [phone] to schedule a follow-up appointment at this location with your provider.     PLANNED DISCHARGE ORDERS:     Activity: See below.     Wound Care: See below.      Discharge Medication List as of 10/3/2023  3:38 PM        START taking these medications    Details   polyethylene glycol (MIRALAX) 17 g packet Take 17 g by mouth daily, Disp-7 packet, R-0, E-Prescribe      senna-docusate (SENOKOT-S/PERICOLACE) 8.6-50 MG tablet Take 1 tablet by mouth 2 times daily as needed for constipation, Disp-14 tablet, R-0, E-Prescribe           CONTINUE these medications which have CHANGED    Details   acetaminophen (TYLENOL) 325 MG tablet Take 2 tablets (650 mg) by mouth every 4 hours as needed for other (For optimal non-opioid multimodal pain management to improve pain control.), Disp-100 tablet, R-0, E-Prescribe      oxyCODONE (ROXICODONE) 5 MG tablet Take 1-2 tablets (5-10 mg) by mouth every 4 hours as needed for moderate to severe pain, Disp-33 tablet, R-0, E-Prescribe      tiZANidine (ZANAFLEX) 2 MG tablet TAKE 1 TABLET BY MOUTH THREE TIMES A DAY AS NEEDED FOR MUSCLE SPASM, Disp-30 tablet, R-0, E-Prescribe           CONTINUE these medications which have NOT CHANGED    Details   apixaban ANTICOAGULANT (ELIQUIS) 5 MG tablet Take 1 tablet (5 mg) by mouth 2 times daily, Disp-60 tablet, R-2, E-Prescribe      ARIPiprazole (ABILIFY) 20 MG tablet Take 1 tablet by mouth every evening, Historical      blood glucose (NO BRAND SPECIFIED) test strip  Use to test blood sugar 4 times daily or as directed. accu check guide strips, Disp-360 strip, R-3, E-Prescribe      blood glucose monitoring (NO BRAND SPECIFIED) meter device kit Use to test blood sugar 4  times daily or as directed. Accuceck Guide meterDisp-1 kit, [id number]      blood glucose monitoring (ONE TOUCH ULTRA 2) meter device kit USE TO TEST BLOOD SUGAR 4 TIMES DAILY OR AS DIRECTED. ACCUCECK GUIDE METERHistorical      blood glucose monitoring (ULTRA THIN 30G) lancets Use to test blood sugar 4  times daily or as directed.lancets for acchu check guide, Disp-400 each, R-3, E-Prescribe      buPROPion (WELLBUTRIN SR) 200 MG 12 hr tablet Take 1 tablet by mouth every evening, Historical      busPIRone HCl (BUSPAR) 30 MG tablet TAKE 1 TABLET BY MOUTH TWICE A DAY, Disp-60 tablet,R-5, E-Prescribe      cetirizine (ZYRTEC) 10 MG tablet Take 10 mg by mouth daily, Historical      Continuous Blood Gluc  (FREESTYLE CM 2 READER) GALINA 1 Dose continuous, Disp-1 each, R-0, E-Prescribe      Continuous Blood Gluc Sensor (FREESTYLE CM 2 SENSOR) MISC 1 Dose continuous, Disp-9 each, R-3, E-Prescribe      diclofenac (VOLTAREN) 1 % topical gel Apply 4 g topically 4 times daily, Disp-350 g, R-3, E-Prescribe      ferrous gluconate (FERGON) 324 (38 Fe) MG tablet Take 1 tablet (324 mg) by mouth daily (with breakfast), Disp-90 tablet, R-3, E-Prescribe      glimepiride (AMARYL) 2 MG tablet Take 2 tablets (4 mg) by mouth 2 times daily (before meals), Disp-360 tablet, R-3, E-Prescribe      insulin aspart (NOVOLOG FLEXPEN) 100 UNIT/ML pen Using as needed for high blood sugars, Historical      insulin glargine (LANTUS SOLOSTAR) 100 UNIT/ML pen Inject 35-42 units at bedtime as directed + 2 units for priming of pen. Max dose 44 units., Disp-45 mL, R-1, E-PrescribeIf Lantus is not covered by insurance, may substitute Basaglar or Semglee or other insulin glargine product per insurance preference  at same dose and frequency.         insulin pen needle (31G X 5 MM) 31G X 5 MM miscellaneous Use four times 4 day times a dayDisp-200 each, [id number]      Multiple Vitamin (DAILY-ISAAC MULTIVITAMIN) TABS TAKE 1 TABLET BY MOUTH EVERY DAY, Disp-90 tablet, R-0, E-Prescribe      naloxone (NARCAN) 4 MG/0.1ML nasal spray Spray 1 spray (4 mg) into one nostril alternating nostrils once as needed for opioid reversal every 2-3 minutes until assistance arrives, Disp-0.2 mL, R-0, E-Prescribe      nystatin (MYCOSTATIN) 399668 UNIT/GM external powder Apply 1 dose topically 3 times daily as neededDisp-60 g, [id number]      omeprazole (PRILOSEC) 20 MG DR capsule TAKE 2 CAPSULES (40 MG) BY MOUTH DAILY *TAKE 30-60 MINUTES BEFORE A MEAL*, Disp-180 capsule, R-2, E-Prescribe      PARoxetine (PAXIL) 40 MG tablet Take 1 tablet by mouth every evening, Historical      pioglitazone (ACTOS) 45 MG tablet Take 1 tablet (45 mg) by mouth daily, Disp-90 tablet, R-1, E-Prescribe      propranolol ER (INDERAL LA) 80 MG 24 hr capsule TAKE 1 CAPSULE BY MOUTH EVERY DAY, Disp-90 capsule, R-3, E-Prescribe      ramipril (ALTACE) 5 MG capsule TAKE 1 CAPSULE BY MOUTH EVERY DAY, Disp-90 capsule, R-2, E-PrescribeDX Code Needed  ASAP PLEASE PT IS ALL OUT.      rOPINIRole (REQUIP) 0.25 MG tablet TAKE 1 TABLET (0.25 MG) BY MOUTH EVERY EVENING, Disp-90 tablet, R-0, E-Prescribe      simvastatin (ZOCOR) 20 MG tablet TAKE 1 TABLET BY MOUTH EVERYDAY AT BEDTIME, Disp-90 tablet, R-2, E-Prescribe      traZODone (DESYREL) 50 MG tablet Take 75 mg by mouth At Bedtime, Historical      vitamin C ([facility]S VITAMIN C) 500 MG tablet 1/2 TABLET BY MOUTH JACOBSON WITH IRON SUPPLEMENT BEFORE MEAL, Disp-45 tablet, R-3, E-Prescribe      VITAMIN D3 25 MCG (1000 UT) tablet TAKE 1 TABLET BY MOUTH EVERY DAY, Disp-90 tablet, R-3, E-Prescribe      gabapentin (NEURONTIN) 600 MG tablet TAKE 2 TABLETS (1,200 MG) BY MOUTH 3 TIMES DAILY FOR 30 DAYS, Disp-180 tablet, R-5, E-Prescribe      metFORMIN (GLUCOPHAGE XR) 500 MG 24 hr  "tablet Take two tablets with breakfast and one tablet with dinner for one week then increase to two tablets with breakfast and two tablets with dinner thereafter, Disp-360 tablet, R-1, E-Prescribe           STOP taking these medications       CVS ASPIRIN ADULT LOW DOSE 81 MG chewable tablet Comments:   Reason for Stopping:                 Discharge Procedure Orders   Referral Order to Outpatient Physical Therapy   Standing Status: Future   Referral Priority: Routine Referral Type: Rehab Therapy Physical Therapy   Number of Visits Requested: 1     Reason for your hospital stay   Order Comments: Total knee arthroplasty with Dr. Sheehan     When to call - Contact Surgeon Team   Order Comments: You may experience symptoms that require follow-up before your scheduled appointment. Refer to the \"Stoplight Tool\" for instructions on when to contact your Surgeon Team if you are concerned about pain control, blood clots, constipation, or if you are unable to urinate.     When to call - Reach out to Urgent Care   Order Comments: If you are not able to reach your Surgeon Team and you need immediate care, go to the Orthopedic Walk-in Clinic or Urgent Care at your Surgeon's office.  Do NOT go to the Emergency Room unless you have shortness of breath, chest pain, or other signs of a medical emergency.     When to call - Reasons to Call 911   Order Comments: Call 911 immediately if you experience sudden-onset chest pain, arm weakness/numbness, slurred speech, or shortness of breath     Discharge Instruction - Breathing exercises   Order Comments: Perform breathing exercises using your Incentive Spirometer 10 times per hour while awake for 2 weeks.     Symptoms - Fever Management   Order Comments: A low grade fever can be expected after surgery.  Use acetaminophen (TYLENOL) as needed for fever management.  Contact your Surgeon Team if you have a fever greater than 101.5 F, chills, and/or night sweats.     Symptoms - Constipation " management   Order Comments: Constipation (hard, dry bowel movements) is expected after surgery due to the combination of being less active, the anesthetic, and the opioid pain medication.  You can do the following to help reduce constipation:  ~  FLUIDS:  Drink clear liquids (water or Gatorade), or juice (apple/prune).  ~  DIET:  Eat a fiber rich diet.    ~  ACTIVITY:  Get up and move around several times a day.  Increase your activity as you are able.  MEDICATIONS:  Reduce the risk of constipation by starting medications before you are constipated.  You can take Miralax   (1 packet as directed) and/or a stool softener (Senokot 1-2 tablets 1-2 times a day).  If you already have constipation and these medications are not working, you can get magnesium citrate and use as directed.  If you continue to have constipation you can try an over the counter suppository or enema.  Call your Surgeon Team if it has been greater than 3 days since your last bowel movement.     Symptoms - Reduced Urine Output   Order Comments: Changes in the amount of fluids you drank before and after surgery may result in problems urinating.  It is important to stay well-hydrated after surgery and drink plenty of water. If it has been greater than 8 hours since you have urinated despite drinking plenty of water, call your Surgeon Team.     Activity - Exercises to prevent blood clots   Order Comments: Unless otherwise directed by your Surgeon team, perform the following exercises at least three times per day for the first four weeks after surgery to prevent blood clots in your legs: 1) Point and flex your feet (Ankle Pumps), 2) Move your ankle around in big circles, 3) Wiggle your toes, 4) Walk, even for short distances, several times a day, will help decrease the risk of blood clots.     Order Specific Question Answer Comments   Is discharge order? Yes      Comfort and Pain Management - Pain after Surgery   Order Comments: Pain after surgery is  "normal and expected.  You will have some amount of pain for several weeks after surgery.  Your pain will improve with time.  There are several things you can do to help reduce your pain including: rest, ice, elevation, and using pain medications as needed. Contact your Surgeon Team if you have pain that persists or worsens after surgery despite rest, ice, elevation, and taking your medication(s) as prescribed. Contact your Surgeon Team if you have new numbness, tingling, or weakness in your operative extremity.     Comfort and Pain Management - Swelling after Surgery   Order Comments: Swelling and/or bruising of the surgical extremity is common and may persist for several months after surgery. In addition to frequent icing and elevation, gentle compressive support with an ACE wrap or tubigrip may help with swelling. Apply compression regularly, removing at least twice daily to perform skin checks. Contact your Surgeon Team if your swelling increases and is NOT associated with an increase in your activity level, or if your swelling increases and is associated with redness and pain.     Comfort and Pain Management - LOWER Extremity Elevation   Order Comments: Swelling is expected for several months after surgery. This type of swelling is usually associated with gravity and activity, and can be improved with elevation.   The best way to do this is to get your \"toes above your nose\" by laying down and placing several pillows lengthwise under your calf and heel. When elevating your leg keep your knee completely straight. Perform this elevation as often as possible especially for the first two weeks after surgery.     Comfort and Pain Management - Cold therapy   Order Comments: Ice can be used to control swelling and discomfort after surgery. Place a thin towel over your operative site and apply the ice pack overtop. Leave ice pack in place for 20 minutes, then remove for 20 minutes. Repeat this 20 minutes on/20 minutes " off routine as often as tolerated.     Medication Instructions - Acetaminophen (TYLENOL) Instructions   Order Comments: You were discharged with acetaminophen (TYLENOL) for pain management after surgery. Acetaminophen most effectively manages pain symptoms when it is taken on a schedule without missing doses (every four, six, or eight hours). Your Provider will prescribe a safe daily dose between 3000 - 4000 mg.  Do NOT exceed this daily dose. Most patients use acetaminophen for pain control for the first four weeks after surgery.  You can wean from this medication as your pain decreases.     Medication Instructions - NSAID Instructions   Order Comments: You were discharged with an anti-inflammatory medication for pain management to use in combination with acetaminophen (TYLENOL) and the narcotic pain medication.  Take this medication exactly as directed.  You should only take one anti-inflammatory at a time.  Some common anti-inflammatories include: ibuprofen (ADVIL, MOTRIN), naproxen (ALEVE, NAPROSYN), celecoxib (CELEBREX), meloxicam (MOBIC), ketorolac (TORADOL).  Take this medication with food and water.     Medication Instructions - Opioids - Tapering Instructions   Order Comments: In the first three days following surgery, your symptoms may warrant use of the narcotic pain medication every four to six hours as prescribed. This is normal. As your pain symptoms improve, focus your efforts on decreasing (tapering) use of narcotic medications. The most successful tapering strategy is to first, decrease the number of tablets you take every 4-6 hours to the minimum prescribed. Then, increase the amount of time between doses.  For example:  First, taper to   or 1 tablet every 4-6 hours.  Then, taper to   or 1 tablet every 6-8 hours.  Then, taper to   or 1 tablet every 8-10 hours.  Then, taper to   or 1 tablet every 10-12 hours.  Then, taper to   or 1 tablet at bedtime.  The bedtime dose can help with comfort during  sleep and is typically the last dose to be discontinued after surgery.     Follow Up Care   Order Comments: Follow-up with your Surgeon Team in 2 weeks for wound check.     Physical Therapy Instructions   Order Comments: Begin physical therapy within one week following surgery.     Activity - Total Knee Arthroplasty     Order Specific Question Answer Comments   Is discharge order? Yes      Return to Driving   Order Comments: Return to driving - Driving is NOT permitted until directed by your provider. Under no circumstance are you permitted to drive while using narcotic pain medications.     Order Specific Question Answer Comments   Is discharge order? Yes      Return to athletic activities   Order Comments: Return to athletic activities- Activities such as swimming, bicycling, jogging, running, and stop-and-go sports should be avoided until permitted by your Provider.     Order Specific Question Answer Comments   Is discharge order? Yes      Return to School / Work   Order Comments: Return to School / Work: You may return to work/school when directed by your Provider.     Order Specific Question Answer Comments   Is discharge order? Yes      NO Precautions   Order Comments: No precautions directed by your Provider.  You may perform range of motion activities as tolerated.     Order Specific Question Answer Comments   Is discharge order? Yes      Weight bearing as tolerated   Order Comments: Weight bearing as tolerated on your operative extremity.     Order Specific Question Answer Comments   Is discharge order? Yes      Dressing / Wound Care - Wound   Order Comments: You have a clean dressing on your surgical wound. Dressing change instructions as follows: dressing will be removed at your follow-up appointment. Contact your Surgeon Team if you have increased redness, warmth around the surgical wound, and/or drainage from the surgical wound.     Dressing Wound Care - Shower with wound/dressing NOT covered   Order  Comments: You do not need to cover your dressing or incision in the shower, you may allow water and soap to run over top of the surgical dressing or incision. You may shower 2 days after surgery.  You are strictly prohibited from soaking or submerging the surgical wound underwater.     Dressing / Wound Care - NO Tub Bathing   Order Comments: Tub bathing, swimming, or any other activities that will cause your incision to be submerged in water should be avoided until allowed by your Surgeon.     Medication instructions -  Anticoagulation - aspirin   Order Comments: Take the aspirin as prescribed for a total of four weeks after surgery.  This is given to help minimize your risk of blood clot.     Medication Instructions - Opioid Instructions (Greater than or equal to 65 years)   Order Comments: You were discharged with an opioid medication (hydromorphone, oxycodone, hydrocodone, or tramadol). This medication should only be taken for breakthrough pain that is not controlled with acetaminophen (TYLENOL). If you rate your pain less than 3 you do not need this medication.  Pain rating 0-3:  You do not need this medication  Pain rating 4-6:  Take 1/2 tablet every 4-6 hours as needed  Pain rating 7-10:  Take 1 tablet every 4-6 hours as needed  Do not exceed 4 tablets per day     Crutches DME   Order Comments: DME Documentation: Describe the reason for need to support medical necessity: Impaired gait status post knee surgery. I, the undersigned, certify that the above prescribed supplies are medically necessary for this patient and is both reasonable and necessary in reference to accepted standards of medical practice in the treatment of this patient's condition and is not prescribed as a convenience.     Order Specific Question Answer Comments   DME Provider: [facility]    Crutch Type: Standard    Crutches Add On: NA    Length of Need: Lifetime      Cane DME   Order Comments: DME Documentation: Describe the reason for  need to support medical necessity: Impaired gait status post knee surgery. I, the undersigned, certify that the above prescribed supplies are medically necessary for this patient and is both reasonable and necessary in reference to accepted standards of medical practice in the treatment of this patient's condition and is not prescribed as a convenience.     Order Specific Question Answer Comments   DME Provider: [facility]    Cane Type: Single Tip    Reminder: Patient can typically get 1 every 5 years      Walker DME   Order Comments: DME Documentation: Describe the reason for need to support medical necessity: Impaired gait status post knee surgery. I, the undersigned, certify that the above prescribed supplies are medically necessary for this patient and is both reasonable and necessary in reference to accepted standards of medical practice in the treatment of this patient's condition and is not prescribed as a convenience.     Order Specific Question Answer Comments   DME Provider: [facility]    Walker Type: Standard (2 Wheel)    Accessories: N/A      Discharge Instruction - Regular Diet Adult   Order Comments: Return to your pre-surgery diet unless instructed otherwise     Order Specific Question Answer Comments   Is discharge order? Yes        Sanford Saldana MD  Orthopaedic Surgery PGY-4  [phone]

## 2023-10-05 ENCOUNTER — TELEPHONE (OUTPATIENT)
Dept: FAMILY MEDICINE | Facility: CLINIC | Age: 68
End: 2023-10-05
Payer: COMMERCIAL

## 2023-10-05 ENCOUNTER — PATIENT OUTREACH (OUTPATIENT)
Dept: GERIATRIC MEDICINE | Facility: CLINIC | Age: 68
End: 2023-10-05
Payer: COMMERCIAL

## 2023-10-05 NOTE — Clinical Note
You are receiving this message because this member is on the state government program [facility] [facility] or [facility] [facility].  are required to notify the primary care physician with all ED visits, admissions and discharges from hospitals and nursing homes. If you have further questions please feel free to contact me.  Malathi Bacon RN [facility] Coordinator [phone]

## 2023-10-05 NOTE — TELEPHONE ENCOUNTER
[facility] Call Center    Phone Message    May a detailed message be left on voicemail: yes     Reason for Call:     Patient would like a referral for rehab in her home       Action Taken: P [facility] Pool    Travel Screening: Not Applicable

## 2023-10-05 NOTE — PROGRESS NOTES
TRANSITIONS OF CARE (TASNEEM) LOG   TASNEEM tasks should be completed by the CC within one (1) business day of notification of each transition. Follow up contact with member is required after return to their usual care setting.  Note:  If CC finds out about the transitions fifteen (15) days or more after the member has returned to their usual care setting, no TASNEEM log is needed. However, the CC should check in with the member to discuss the transition process, any changes needed to the care plan and document it in a case note.    Member Name:  Holly Muir [facility] [id number]   Product: [facility] Care Coordinator Contact:  Malathi Bacon RN, PHN Agency/[facility]/Care System: [facility]   Transition Communication Actions from Care Management Contact   Transition #1   Notification Date: 10/05/23 Transition Date:   10/02/23 Transition From: Home     Is this the member s usual care setting?               yes Transition To: [facility]   Transition Type:  Planned  Reason for Admission/Comments:  Scheduled surgery for her R knee    Contact member/responsible party to offer assistance with transition Date completed: NA    Notes from conversation with the member/responsible party, provider, discharging and receiving facility (as applicable):   Date NA:member discharge home prior to Care Coordinator notification.      Shared CC contact info, care plan/services with receiving setting--Date completed: NA   Name & Title of receiving setting contact: NA   Notified PCP of transition--Date completed:  10/05/23     via  EMR  Name of PCP: Tiffany King     Transition #2   Notification Date: 10/05/23 Transition Date:   10/03/23 Transition From: [facility]     Is this the member s usual care setting?               no Transition To: Home   Transition Type:  Planned  Reason for  Admission/Comments:  Routine discharge home   Contact member/responsible party to offer assistance with transition Date completed: 10/05/23    Notes from conversation with the member/responsible party, provider, discharging and receiving facility (as applicable):   Date 10/05/23:CC contacted member and reviewed discharge summary.  Member has a follow-up appointment with PCP in 7 days: Yes: scheduled on 10/12/23    Member has had a change in condition: No  Home visit needed: No  Care plan reviewed and updated.  The following home based services PCA were resumed.  New referrals placed: Yes: Therapies: outpatient PT  Transition log completed.   PCP, Tiffany King, notified of transition back to home via EMR.     Shared CC contact info, care plan/services with receiving setting--Date completed: 10/05/23   Name & Title of receiving setting contact: NA   Notified PCP of transition--Date completed:  10/05/23     via  EMR  Name of PCP: Tiffany King      *RETURN TO USUAL CARE SETTING: *Complete tasks below when the member is discharging TO their usual care setting within one (1) business day of notification..      For situations where the Care Coordinator is notified of the discharge prior to the date of discharge, the Care Coordinator must follow up with the member or designated representative to confirm that discharge actually occurred and discuss required TASNEEM tasks as outlined in the TASNEEM Instructions.  (This includes situations where it may be a  new  usual care setting for the member. (i.e., a community member who decides upon permanent nursing home placement following hospitalization and rehab).    Discuss with Member/Responsible Party:    Check  Yes  - if the member, family member and/or SNF/facility staff manages the following:    If  No  provide explanation in the comments section.          Date completed: 10/05/23 Communicated with member or their designated representative about the following:  care transition process;  about changes to the member s health status; plan of care updates; education about transitions and how to prevent unplanned transitions/readmissions    Four Pillars for Optimal Transition:    Check  Yes  - if the member, family member and/or SNF/facility staff manages the following:    If  No  provide explanation in the comments section.          [x]  Yes     []  No Does the member have a follow-up appointment scheduled with primary care or specialist? (Mental health hospitalizations--the appt. should be w/in 7 days)              For mental health hospitalizations:  []  Yes     []  No     Does the member have a follow-up appointment scheduled with a mental health practitioner within 7 days of discharge?  [x]  Yes     []  No     Has a medication review been completed with member? If no, refer to PCP, home care nurse, MTM, pharmacist  [x]  Yes     []  No     Can the member manage their medications or is there a system in place to manage medications (e.g. home care set-up)?         [x]  Yes     []  No     Can the member verbalize warning signs and symptoms to watch for and how to respond?  [x]  Yes     []  No     Does the member have a copy of and understand their discharge instructions?  If no, assist to obtain copy of discharge instructions, review discharge instructions, and assist to contact PCP to discuss questions about their recent hospitalization.  [x]  Yes     []  No     Does the member have adequate food, housing and transportation?  If no, add goal and discuss additional supports available to the member                                                                                                                                                                                 [x]  Yes     []  No     Is the member safe in their home?  If no, document needs and support provided                                                                                                                                                                           []  Yes     [x]  No     Are there any concerns of vulnerability, abuse, or neglect?  If yes, document concerns and actions taken by Care Coordinator as a mandated                                                                                                                                                                              [x]  Yes     []  No     Does the member use a Personal Health Care Record?  Check  Yes  if visit summary, discharge summary, and/or healthcare summary are being used as a PHR.                                                                                                                                                                                  [x]  Yes     []  No     Have you reviewed the discharge summary with the member? If  No  provide explanation in comments.  [x]  Yes     []  No     Have you updated the member s care plan/support plan? Add new diagnosis, medications, treatments, goals & interventions, as applicable. If No, provide explanation in comments.    Comments:           Notes from conversation with the member/responsible party, provider, discharging and receiving facility (as applicable): spoke with member on 10/05/23, reviewed upcoming appointments, she reports she will be flying to [place] on 10/16 for her mother's . Care Coordinator encouraged her to discuss this with her surgeon prior to leaving.         Malathi Bacon RN PHN  Care Coordinator  [facility]  [phone]

## 2023-10-06 ENCOUNTER — LAB (OUTPATIENT)
Dept: LAB | Facility: CLINIC | Age: 68
End: 2023-10-06
Payer: COMMERCIAL

## 2023-10-06 ENCOUNTER — TELEPHONE (OUTPATIENT)
Dept: ORTHOPEDICS | Facility: CLINIC | Age: 68
End: 2023-10-06

## 2023-10-06 DIAGNOSIS — D50.8 IRON DEFICIENCY ANEMIA SECONDARY TO INADEQUATE DIETARY IRON INTAKE: ICD-10-CM

## 2023-10-06 DIAGNOSIS — Z93.3 S/P COLOSTOMY (H): ICD-10-CM

## 2023-10-06 DIAGNOSIS — Z90.49 S/P PARTIAL COLECTOMY: ICD-10-CM

## 2023-10-06 DIAGNOSIS — Z86.711 HISTORY OF PULMONARY EMBOLISM: ICD-10-CM

## 2023-10-06 DIAGNOSIS — D64.9 ANEMIA, UNSPECIFIED TYPE: ICD-10-CM

## 2023-10-06 LAB
ERYTHROCYTE [DISTWIDTH] IN BLOOD BY AUTOMATED COUNT: 16 % (ref 10–15)
FERRITIN SERPL-MCNC: 671 NG/ML (ref 11–328)
HCT VFR BLD AUTO: 24.9 % (ref 35–47)
HGB BLD-MCNC: 8 G/DL (ref 11.7–15.7)
IRON BINDING CAPACITY (ROCHE): 221 UG/DL (ref 240–430)
IRON SATN MFR SERPL: 13 % (ref 15–46)
IRON SERPL-MCNC: 29 UG/DL (ref 37–145)
MCH RBC QN AUTO: 27.4 PG (ref 26.5–33)
MCHC RBC AUTO-ENTMCNC: 32.1 G/DL (ref 31.5–36.5)
MCV RBC AUTO: 85 FL (ref 78–100)
PLATELET # BLD AUTO: 330 10E3/UL (ref 150–450)
RBC # BLD AUTO: 2.92 10E6/UL (ref 3.8–5.2)
RETICS # AUTO: 0.05 10E6/UL (ref 0.03–0.1)
RETICS/RBC NFR AUTO: 1.8 % (ref 0.5–2)
WBC # BLD AUTO: 8.1 10E3/UL (ref 4–11)

## 2023-10-06 PROCEDURE — 85045 AUTOMATED RETICULOCYTE COUNT: CPT

## 2023-10-06 PROCEDURE — 82728 ASSAY OF FERRITIN: CPT

## 2023-10-06 PROCEDURE — 83540 ASSAY OF IRON: CPT

## 2023-10-06 PROCEDURE — 83550 IRON BINDING TEST: CPT

## 2023-10-06 PROCEDURE — 85027 COMPLETE CBC AUTOMATED: CPT

## 2023-10-06 PROCEDURE — 36415 COLL VENOUS BLD VENIPUNCTURE: CPT

## 2023-10-06 NOTE — TELEPHONE ENCOUNTER
Patient scheduled for a 2 week post op nurse visit today, 4 days after surgery. This appointment will need to be moved to 2 weeks after surgery.  Called patient and left a message to let her know we need to reschedule this appointment today and to call back so we can discuss any questions she has and to get home care PT ordered.   Yaa Eden RN

## 2023-10-09 ENCOUNTER — VIRTUAL VISIT (OUTPATIENT)
Dept: PHARMACY | Facility: CLINIC | Age: 68
End: 2023-10-09
Payer: COMMERCIAL

## 2023-10-09 ENCOUNTER — TELEPHONE (OUTPATIENT)
Dept: ORTHOPEDICS | Facility: CLINIC | Age: 68
End: 2023-10-09
Payer: COMMERCIAL

## 2023-10-09 DIAGNOSIS — M17.11 PRIMARY OSTEOARTHRITIS OF RIGHT KNEE: ICD-10-CM

## 2023-10-09 DIAGNOSIS — F42.9 OBSESSIVE-COMPULSIVE DISORDER, UNSPECIFIED TYPE: ICD-10-CM

## 2023-10-09 DIAGNOSIS — Z96.651 STATUS POST TOTAL KNEE REPLACEMENT, RIGHT: Primary | ICD-10-CM

## 2023-10-09 DIAGNOSIS — M54.50 CHRONIC BILATERAL LOW BACK PAIN WITHOUT SCIATICA: ICD-10-CM

## 2023-10-09 DIAGNOSIS — G25.81 RESTLESS LEG SYNDROME: ICD-10-CM

## 2023-10-09 DIAGNOSIS — G89.29 CHRONIC BILATERAL LOW BACK PAIN WITHOUT SCIATICA: ICD-10-CM

## 2023-10-09 DIAGNOSIS — E11.65 TYPE 2 DIABETES MELLITUS WITH HYPERGLYCEMIA, WITH LONG-TERM CURRENT USE OF INSULIN (H): ICD-10-CM

## 2023-10-09 DIAGNOSIS — Z79.4 TYPE 2 DIABETES MELLITUS WITH HYPERGLYCEMIA, WITH LONG-TERM CURRENT USE OF INSULIN (H): ICD-10-CM

## 2023-10-09 DIAGNOSIS — F32.3 SEVERE MAJOR DEPRESSION WITH PSYCHOTIC FEATURES (H): ICD-10-CM

## 2023-10-09 DIAGNOSIS — F25.9 SCHIZOAFFECTIVE DISORDER, UNSPECIFIED TYPE (H): Primary | ICD-10-CM

## 2023-10-09 PROCEDURE — 99607 MTMS BY PHARM ADDL 15 MIN: CPT | Performed by: PHARMACIST

## 2023-10-09 PROCEDURE — 99606 MTMS BY PHARM EST 15 MIN: CPT | Performed by: PHARMACIST

## 2023-10-09 RX ORDER — ROPINIROLE 0.25 MG/1
0.25 TABLET, FILM COATED ORAL EVERY EVENING
Qty: 90 TABLET | Refills: 0 | Status: SHIPPED | OUTPATIENT
Start: 2023-10-09 | End: 2024-01-10

## 2023-10-09 NOTE — TELEPHONE ENCOUNTER
7 days S/P RTKA 10/2/23     Called and spoke with Pt who reiterated their swelling and numbness complaints and were just curious if this was normal. After denying all red flags of infection and DVT I informed the Pt that this was normal and to continue with their PRICE guidelines to address swelling. Pt was thankful for call and will call back if there are any changes.    Martinez JESUS ATC

## 2023-10-09 NOTE — TELEPHONE ENCOUNTER
Patient is calling to request refill for Ropinirole 0.25 mg. Patient states pharmacy is refusing to fill and patient does not have any medication left.     Patient requesting call back for update once provider responds.     Routing to provider to review and advise.     EVELYN Francis  [facility]

## 2023-10-09 NOTE — TELEPHONE ENCOUNTER
[facility] Call Center    Phone Message    May a detailed message be left on voicemail: yes     Reason for Call: Other: Patient stated that the surgery site is numb and swollen. Ankle is also numb. Pain going up to her thigh and a lot of discomfort. Pain rated a 7/10. Patient would like a call back for medical advice.     Action Taken: Message routed to:  [facility] ([facility] Orthopedics    Travel Screening: Not Applicable

## 2023-10-09 NOTE — PROGRESS NOTES
Medication Therapy Management (MTM) Encounter    ASSESSMENT:                            Medication Adherence/Access: See below for considerations    Diabetes:   Patient is not meeting A1c goal of < 7%. Blood sugars likely elevated due to lower dose of Lantus. Would benefit from going back to dose prior to having surgery.    Depression/OCD/Schizoaffective disorder: Stable. Follows closely with psychiatry.    Osteoarthritis/chronic pain: improving     PLAN:                          Resume 30 units of Lantus    Follow-up: 3 days via Mychart for insulin adjustment if needed.    SUBJECTIVE/OBJECTIVE:                          Holly Muir is a 68 year old female called for a follow-up visit.  Today's visit is a follow-up MTM visit from 9/6/2023.     Reason for visit:  Med review follow up    Allergies/ADRs: Reviewed in chart  Past Medical History: Reviewed in chart  Tobacco: She reports that she quit smoking about 47 years ago. Her smoking use included cigarettes. She started smoking about 48 years ago. She has never used smokeless tobacco.  Alcohol: not currently using  Lives between two daughters.     Medication Adherence/Access: daughters help manage medications. Uses a pill box. Patient is not very familiar with her medications.  Per patient, daughters hand her her medications to her.  Has timer on her phone to remind her when to take her insulin.  The patient fills medications at [facility]: NO, [facility]    Diabetes   Type 2 Diabetes:    Lantus 35-42 units daily  Novolog as needed  Actos 45mg daily  Glimepiride 4mg twice daily  Metformin XR 1000mg twice daily  Not taking aspirin due to Apixiban  Patient went to the ED due to high blood sugars  a couple of days after surgery. Her blood sugars were 300 today, she took 6 units of Novolog 15 minutes ago and still 277mg/dL. Wasn't really hungry today and the food she was delivered would have made her blood sugars too high. Is not  running a fever and denies drainage or oozing from her incision.    Patient has been taking 20 units of Lantus. She was told to adjust her dose prior to surgery and has not gone back to her previous regular dose of 30 units.  Is going back to 30 units tonight.   Patient is not experiencing side effects.  Blood sugar monitoring: Continuous Glucose Monitor   Current diabetes symptoms: none  Diet/Exercise: Appetite isn't the best since surgery. Had a small hamburger yesterday.  Has not had anything to eat yet today. Has been drinking a lot of water     Eye exam in the last 12 months? No  Foot exam: due  Urine Albumin:   Lab Results   Component Value Date    UMALCR 64.26 (H) 09/15/2023      Lab Results   Component Value Date    A1C 7.7 (H) 09/15/2023     Depression/OCD/Schizoaffective disorder:  Abilify 20mg daily  Trazodone 75mg at bedtime  Paroxetine 40mg daily  Buproprion 200mg daily  Buspirone 30mg twice dailiy    Transitioning to a new therapist but appointment is not until November. Patient [facility] see psychiatrist at [facility]  Patient is falling asleep ok but is waking up with knee pain  Denies visual hallucinations. Does report hearing things but thinks this is due to her hearing aid picking up noises. Anxiety is a little worse.  Feels the increase in abilify has been helpful.   Mother passed away. Leaving 10/16 for [place] and returning on 10/29.    Osteoarthritis/chronic pain:    Oxycodone 5-10 mg every three times daily as needed-taking 2-3 times a day. Makes her kind of sleepy.  Tizanidine 2mg three times daily as needed  Acetaminophen 650mg every 8 hours (max 4g per day)  Diclofenac gel-using about 5 times a day-is  holding until recovers from her surgery    Had knee surgery 10/2. Has pain but less pain than prior to surgery.    Today's Vitals: There were no vitals taken for this visit.  ----------------  I spent 18 minutes with this patient today. All changes were made via collaborative practice  agreement with Dr. King. A copy of the visit note was provided to the patient's provider(s).    A summary of these recommendations was sent via [place].    Telemedicine Visit Details  Type of service:  Telephone visit  Start Time: 1:55PM  End Time: 2:13PM     Medication Therapy Recommendations  Type 2 diabetes mellitus with hyperglycemia, with long-term current use of insulin (H)    Current Medication: insulin glargine (LANTUS SOLOSTAR) 100 UNIT/ML pen   Rationale: Dose too low - Dosage too low - Effectiveness   Recommendation: Increase Dose   Status: Accepted per CPA

## 2023-10-11 NOTE — TELEPHONE ENCOUNTER
Placed home care PT referral, documented in other tele encounter from 10/9/23.    Servando Reed RN

## 2023-10-13 ENCOUNTER — ALLIED HEALTH/NURSE VISIT (OUTPATIENT)
Dept: NURSING | Facility: CLINIC | Age: 68
End: 2023-10-13
Payer: COMMERCIAL

## 2023-10-13 DIAGNOSIS — Z96.651 STATUS POST TOTAL KNEE REPLACEMENT, RIGHT: Primary | ICD-10-CM

## 2023-10-13 PROCEDURE — 99024 POSTOP FOLLOW-UP VISIT: CPT

## 2023-10-13 NOTE — PROGRESS NOTES
Holly Muir comes into clinic today at the request of Dr. Sheehan for post op wound check.    S: The patient is status post Right total knee arthroplasty, DOS: 10/2/23.   There has been no history of infection or drainage. Pt complains of swelling and pain throughout the knee as well as skin sensitivity. Pt reports elevating and icing with relief. Currently taking oxycodone and Tylenol for pain. Weaning down on the oxycodone, not needing a refill at this time.    O/A: Incision is well approximated, clean, dry, intact, and no signs of infection. Moderate amount of swelling which appears to mostly in her knee. Very mild swelling in ankle. Pt does have evidence of post op blistering around the incision, but the skin below appears to have already healed in all except one spot on the medial aspect of the incision about half way. There is an eraser size spot that had scant drainage when removing the steri-strips. I believe this residual blistering made apparent by the removal of the steri-strips and sloughing skin. No redness or other signs of infection. Incision was cleaned well and new gauze dressing applied. Pt may use bandaid only on this spot after today and she was given some supplies for this.      Since she is needing to fly to [place] for her mother's  on Monday, we discussed wrapping the knee and the fluctuations in swelling she is likely to experience. I demonstrated wrapping the knee and she verbalized comfort in doing this or having her daughter help.     She has not started PT yet, but has been doing the at-home exercises given to her while in the hospital. A home-care PT order was placed last week, but she has not heard from [facility] yet. I advised that there may not staff and she may have to go into the clinic if she is able to get a ride from family/friends.     P: Routine wound care discussed. The patient will follow up in four weeks, per post op plan. If there are any concerns or signs  and symptoms of infection, patient will reach out to the clinic. Patient is agreeable with plan.    This service provided today was under the direct supervision of Dr. Harrington, who was available if needed.    Servando Reed RN

## 2023-10-31 DIAGNOSIS — M25.562 ARTHRALGIA OF BOTH KNEES: ICD-10-CM

## 2023-10-31 DIAGNOSIS — M25.561 ARTHRALGIA OF BOTH KNEES: ICD-10-CM

## 2023-10-31 RX ORDER — PSEUDOEPHED/ACETAMINOPH/DIPHEN 30MG-500MG
TABLET ORAL
Qty: 180 TABLET | Refills: 1 | OUTPATIENT
Start: 2023-10-31

## 2023-11-01 DIAGNOSIS — Z96.651 STATUS POST TOTAL KNEE REPLACEMENT, RIGHT: Primary | ICD-10-CM

## 2023-11-03 ENCOUNTER — THERAPY VISIT (OUTPATIENT)
Dept: PHYSICAL THERAPY | Facility: CLINIC | Age: 68
End: 2023-11-03
Payer: COMMERCIAL

## 2023-11-03 DIAGNOSIS — Z96.651 S/P TOTAL KNEE ARTHROPLASTY, RIGHT: Primary | ICD-10-CM

## 2023-11-03 DIAGNOSIS — M17.11 PRIMARY OSTEOARTHRITIS OF RIGHT KNEE: ICD-10-CM

## 2023-11-03 PROCEDURE — 97110 THERAPEUTIC EXERCISES: CPT | Mod: GP

## 2023-11-03 PROCEDURE — 97161 PT EVAL LOW COMPLEX 20 MIN: CPT | Mod: GP

## 2023-11-03 ASSESSMENT — ACTIVITIES OF DAILY LIVING (ADL)
SWELLING: THE SYMPTOM AFFECTS MY ACTIVITY SEVERELY
SQUAT: ACTIVITY IS VERY DIFFICULT
GIVING WAY, BUCKLING OR SHIFTING OF KNEE: THE SYMPTOM AFFECTS MY ACTIVITY SEVERELY
SIT WITH YOUR KNEE BENT: ACTIVITY IS VERY DIFFICULT
GO DOWN STAIRS: ACTIVITY IS SOMEWHAT DIFFICULT
STIFFNESS: THE SYMPTOM AFFECTS MY ACTIVITY SEVERELY
WEAKNESS: THE SYMPTOM AFFECTS MY ACTIVITY SEVERELY
RAW_SCORE: 23
KNEE_ACTIVITY_OF_DAILY_LIVING_SUM: 23
HOW_WOULD_YOU_RATE_THE_CURRENT_FUNCTION_OF_YOUR_KNEE_DURING_YOUR_USUAL_DAILY_ACTIVITIES_ON_A_SCALE_FROM_0_TO_100_WITH_100_BEING_YOUR_LEVEL_OF_KNEE_FUNCTION_PRIOR_TO_YOUR_INJURY_AND_0_BEING_THE_INABILITY_TO_PERFORM_ANY_OF_YOUR_USUAL_DAILY_ACTIVITIES?: 5
PAIN: THE SYMPTOM AFFECTS MY ACTIVITY SEVERELY
WALK: ACTIVITY IS SOMEWHAT DIFFICULT
KNEE_ACTIVITY_OF_DAILY_LIVING_SCORE: 32.86
RISE FROM A CHAIR: ACTIVITY IS FAIRLY DIFFICULT
LIMPING: THE SYMPTOM AFFECTS MY ACTIVITY SEVERELY
KNEEL ON THE FRONT OF YOUR KNEE: ACTIVITY IS VERY DIFFICULT
GO UP STAIRS: ACTIVITY IS SOMEWHAT DIFFICULT
HOW_WOULD_YOU_RATE_THE_OVERALL_FUNCTION_OF_YOUR_KNEE_DURING_YOUR_USUAL_DAILY_ACTIVITIES?: NEARLY NORMAL
STAND: ACTIVITY IS SOMEWHAT DIFFICULT
AS_A_RESULT_OF_YOUR_KNEE_INJURY,_HOW_WOULD_YOU_RATE_YOUR_CURRENT_LEVEL_OF_DAILY_ACTIVITY?: NEARLY NORMAL

## 2023-11-03 NOTE — PROGRESS NOTES
PHYSICAL THERAPY EVALUATION  Type of Visit: Evaluation    See electronic medical record for Abuse and Falls Screening details.    Subjective       Presenting condition or subjective complaint:    Date of onset: 10/24/23    Relevant medical history:     Dates & types of surgery:      Prior diagnostic imaging/testing results:       Prior therapy history for the same diagnosis, illness or injury:        Prior Level of Function  Transfers:   Ambulation:   ADL:   IADL:     Living Environment  Social support:     Type of home:     Stairs to enter the home:         Ramp:     Stairs inside the home:         Help at home:    Equipment owned:       Employment:      Hobbies/Interests:      Patient goals for therapy:      Subjective Summary: She reports she has gotten her knee replaced and she is very happy about it.  She has been doing some walking without the walker at home.  She is going down steps a lot easier, but still needs improvement. She does report that her anterior leg / shin gets hard.  She can bend her knee well as well as straightening, which is improving each day.  She reports she has pain but much better than her pre-op pain.  She is off pain killers and her goal is to walk better.    Pain assessment: Location: Right Knee /Rating: No Pain and feeling good at rest     Objective   KNEE EVALUATION  PAIN: Pain Level at Rest: 0/10  Pain Level with Use: 3/10  Pain Location: knee  Pain is Exacerbated By: Bending, Straightening and Use  Pain is Relieved By: rest and use  Pain Progression: Improved  INTEGUMENTARY (edema, incisions):  Scar looks well healed. Minimal swelling but some still present  POSTURE:   GAIT:  Weightbearing Status:   Assistive Device(s): Walker (front wheeled)  Gait Deviations:  Walker but reports not using it at home   BALANCE/PROPRIOCEPTION:   WEIGHTBEARING ALIGNMENT:   NON-WEIGHTBEARING ALIGNMENT:   ROM:  -1 to 100 PROM    STRENGTH:  Fair Quad Set  FLEXIBILITY:   SPECIAL TESTS:   FUNCTIONAL  TESTS:   PALPATION:   JOINT MOBILITY:     Assessment & Plan   CLINICAL IMPRESSIONS  Medical Diagnosis: Primary Osteoartritis of the Right Knee    Treatment Diagnosis: S/p Right TKA   Impression/Assessment: Patient is a 68 year old female with Right Knee complaints.  The following significant findings have been identified: Pain, Decreased ROM/flexibility, Decreased strength, Impaired balance, and Impaired gait. These impairments interfere with their ability to perform self care tasks, work tasks, recreational activities, household chores, driving , household mobility, and community mobility as compared to previous level of function.     Clinical Decision Making (Complexity):  Clinical Presentation: Stable/Uncomplicated  Clinical Presentation Rationale: based on medical and personal factors listed in PT evaluation  Clinical Decision Making (Complexity): Low complexity    PLAN OF CARE  Treatment Interventions:  Interventions: Gait Training, Manual Therapy, Neuromuscular Re-education, Therapeutic Activity, Therapeutic Exercise    Long Term Goals     PT Goal 1  Goal Identifier: Ambulation  Goal Description: Will be able to ambulate around home and around her communicate without any assitive device for at least 1000 feet and no pain worse than 2/10 in the knee  Rationale: to maximize safety and independence with performance of ADLs and functional tasks;to maximize safety and independence within the home;to maximize safety and independence within the community;to maximize safety and independence with self cares;to maximize safety and independence with transportation  Goal Progress: Reports being able to ambulate at home without the walker and is excited to ambulate without the walker soon  Target Date: 11/17/23  PT Goal 2  Goal Identifier: Stairs  Goal Description: Patient will be able to ascend and descend two flights of stairs with no UE support and no instability or pain due to her right knee  Rationale: to maximize  safety and independence with performance of ADLs and functional tasks;to maximize safety and independence within the home;to maximize safety and independence within the community;to maximize safety and independence with transportation;to maximize safety and independence with self cares  Goal Progress: Can ascend / descend stairs but it is difficult for her  Target Date: 01/31/24      Frequency of Treatment: 2x a week  Duration of Treatment: 3 months    Recommended Referrals to Other Professionals: Physical Therapy  Education Assessment:        Risks and benefits of evaluation/treatment have been explained.   Patient/Family/caregiver agrees with Plan of Care.     Evaluation Time:     PT Eval, Low Complexity Minutes (06900): 15       Signing Clinician: ALANNAH Gordon [facility]                                                                                   OUTPATIENT PHYSICAL THERAPY      PLAN OF TREATMENT FOR OUTPATIENT REHABILITATION   Patient's Last Name, First Name, Holly Rahman YOB: 1955   Provider's Name   [facility]   Medical Record No.  [id number]     Onset Date: 10/24/23  Start of Care Date: 11/03/23     Medical Diagnosis:  Primary Osteoartritis of the Right Knee      PT Treatment Diagnosis:  S/p Right TKA Plan of Treatment  Frequency/Duration: 2x a week/ 3 months    Certification date from 11/03/23 to 01/31/24         See note for plan of treatment details and functional goals     Gigi Jaramillo, PT                         I CERTIFY THE NEED FOR THESE SERVICES FURNISHED UNDER        THIS PLAN OF TREATMENT AND WHILE UNDER MY CARE     (Physician attestation of this document indicates review and certification of the therapy plan).                Referring Provider:  Daniella Anton PA-C      Initial Assessment  See Epic Evaluation- Start of Care Date: 11/03/23

## 2023-11-06 ENCOUNTER — VIRTUAL VISIT (OUTPATIENT)
Dept: PHARMACY | Facility: CLINIC | Age: 68
End: 2023-11-06
Payer: COMMERCIAL

## 2023-11-06 DIAGNOSIS — E11.65 TYPE 2 DIABETES MELLITUS WITH HYPERGLYCEMIA, WITH LONG-TERM CURRENT USE OF INSULIN (H): ICD-10-CM

## 2023-11-06 DIAGNOSIS — M17.11 PRIMARY OSTEOARTHRITIS OF RIGHT KNEE: Primary | ICD-10-CM

## 2023-11-06 DIAGNOSIS — Z79.4 TYPE 2 DIABETES MELLITUS WITH HYPERGLYCEMIA, WITH LONG-TERM CURRENT USE OF INSULIN (H): ICD-10-CM

## 2023-11-06 PROCEDURE — 99607 MTMS BY PHARM ADDL 15 MIN: CPT | Mod: 93 | Performed by: PHARMACIST

## 2023-11-06 PROCEDURE — 99606 MTMS BY PHARM EST 15 MIN: CPT | Mod: 93 | Performed by: PHARMACIST

## 2023-11-06 RX ORDER — METFORMIN HCL 500 MG
1000 TABLET, EXTENDED RELEASE 24 HR ORAL 2 TIMES DAILY WITH MEALS
Qty: 360 TABLET | Refills: 3 | Status: SHIPPED | OUTPATIENT
Start: 2023-11-06

## 2023-11-06 NOTE — PROGRESS NOTES
Medication Therapy Management (MTM) Encounter    ASSESSMENT:                            Medication Adherence/Access: See below for considerations    Diabetes:   Patient is not meeting A1c goal of < 7%. Would benefit from taking Lantus 30 units consistently.    Osteoarthritis/chronic pain: Stable     PLAN:                          Recommend 30 units of Lantus daily as previously recommended    Follow-up: 1 week    SUBJECTIVE/OBJECTIVE:                          Holly Muir is a 68 year old female called for a follow-up visit.  Today's visit is a follow-up MTM visit from 10/9/2023.     Reason for visit:  blood sugars    Allergies/ADRs: Reviewed in chart  Past Medical History: Reviewed in chart  Tobacco: She reports that she quit smoking about 47 years ago. Her smoking use included cigarettes. She started smoking about 48 years ago. She has never used smokeless tobacco.  Alcohol: not currently using  Lives between two daughters.     Medication Adherence/Access: daughters help manage medications. Uses a pill box. Patient is not very familiar with her medications.  Per patient, daughters hand her her medications to her.  Has timer on her phone to remind her when to take her insulin.  The patient fills medications at [place]: NO, fills medications at [facility]    Diabetes   Type 2 Diabetes:    Lantus 20-30units daily depends on what her sugars; if blood sugar 112-115mg/dL at night will only take 20 units and then the next morning her blood sugars will be over 200mg/dL.  Novolog as needed-has been taking 6 units once daily when blood sugars are high usually in the morning.  Actos 45mg daily  Glimepiride 4mg twice daily  Metformin XR 1000mg twice daily  Not taking aspirin due to Apixiban    Patient is not experiencing side effects.  Blood sugar monitoring: Continuous Glucose Monitor-reports blood sugars have been running high, 300mg/dL  Do not have access to patient's [employer]  Current diabetes  symptoms: polyuria  Diet/Exercise: Breakfast-boiled eggs, French toast with sugar free syrup  Doesn't always eat lunch or if she does will have meat (shredded or ground beef)  Supper-hamburger meat, mixed vegetables, Burger Kevin  Snacking- yogurt  Drinks water, rarely has a soda     Eye exam in the last 12 months? No  Foot exam: due  Urine Albumin:   Lab Results   Component Value Date    UMALCR 64.26 (H) 09/15/2023      Lab Results   Component Value Date    A1C 7.7 (H) 09/15/2023     Osteoarthritis/chronic pain:    Tizanidine 2mg three times daily as needed  Acetaminophen 650mg every 8 hours (max 4g per day)-doesn't feel this is helpful for the pain  Diclofenac gel-using about 5 times a day-is  holding until recovers from her surgery    Hasn't been sleeping very well due to knee pain. Sleeps just a little at a time. Pain is better, but skin is tightening up and this has been hard and painful. It  is a good pain but it is interrupting her sleep. She is doing her physical therapy exercises at home and going to a physical therapist. She has a follow up with her surgeon next week.    Today's Vitals: There were no vitals taken for this visit.  ----------------  I spent 16 minutes with this patient today. All changes were made via collaborative practice agreement with Dr. King. A copy of the visit note was provided to the patient's provider(s).    A summary of these recommendations was sent via [employer].    Telemedicine Visit Details  Type of service:  Telephone visit  Start Time: 1:31PM  End Time: 1:47PM     Medication Therapy Recommendations  Type 2 diabetes mellitus with hyperglycemia, with long-term current use of insulin (H)    Current Medication: insulin glargine (LANTUS SOLOSTAR) 100 UNIT/ML pen   Rationale: Does not understand instructions - Adherence - Adherence   Recommendation: Provide Education   Status: Accepted per CPA

## 2023-11-06 NOTE — PATIENT INSTRUCTIONS
"Recommendations from today's MTM visit:                                                         Recommend 30 units of Lantus daily as previously recommended    Follow-up: 1 week    It was great speaking with you today.  I value your experience and would be very thankful for your time in providing feedback in our clinic survey. In the next few days, you may receive an email or text message from [facility] [employer] with a link to a survey related to your  clinical pharmacist.\"     To schedule another MTM appointment, please call the clinic directly or you may call the MTM scheduling line at [phone] or toll-free at [phone].     My Clinical Pharmacist's contact information:                                                      Please feel free to contact me with any questions or concerns you have.      Liz Calero, Pharm.D, [facility]  Medication Therapy Management Pharmacist        "

## 2023-11-14 ENCOUNTER — OFFICE VISIT (OUTPATIENT)
Dept: ORTHOPEDICS | Facility: CLINIC | Age: 68
End: 2023-11-14
Payer: COMMERCIAL

## 2023-11-14 ENCOUNTER — ANCILLARY PROCEDURE (OUTPATIENT)
Dept: GENERAL RADIOLOGY | Facility: CLINIC | Age: 68
End: 2023-11-14
Attending: ORTHOPAEDIC SURGERY
Payer: COMMERCIAL

## 2023-11-14 DIAGNOSIS — Z96.651 STATUS POST TOTAL KNEE REPLACEMENT, RIGHT: Primary | ICD-10-CM

## 2023-11-14 DIAGNOSIS — Z96.651 STATUS POST TOTAL KNEE REPLACEMENT, RIGHT: ICD-10-CM

## 2023-11-14 PROCEDURE — 99024 POSTOP FOLLOW-UP VISIT: CPT | Performed by: ORTHOPAEDIC SURGERY

## 2023-11-14 PROCEDURE — 73562 X-RAY EXAM OF KNEE 3: CPT | Mod: RT | Performed by: RADIOLOGY

## 2023-11-14 NOTE — NURSING NOTE
Holly Muir's chief complaint for this visit includes:  Chief Complaint   Patient presents with    Surgical Followup     6 week post op RTKA 10/2/23       Referring Provider:  No referring provider defined for this encounter.    There were no vitals taken for this visit.  Data Unavailable   Global Mental Health Score: (P) 13  Global Physical Health Score: (P) 12  PROMIS TOTAL - SUBSCORES: (P) 25  UCLA: 3-4  KOSUSAN Jr.  65.99    Pain increases with: Increased activity, sitting/standing long periods of time  Previous surgeries: 10/2/23  Previous injections within last 6 months: NO  Treatments done: PT 6x, HEP,   Imaging completed: 11/14/23  Pain: 3/10  Concerns: None at this time    Kenyetta Garcia, ATC

## 2023-11-14 NOTE — PROGRESS NOTES
Chief Complaint: Surgical Followup (6 week post op RTKA 10/2/23)         HPI: Holly Muir returns today in follow-up for her right knee.  She reports she is doing very well.  She is no longer using any pain medication.  She is using a walker.  She is advancing rapidly and physical therapy.  She is very happy with how she is doing.      Medications and allergies are documented in the EMR and have been reviewed.      Current Outpatient Medications:     acetaminophen (TYLENOL) 325 MG tablet, Take 2 tablets (650 mg) by mouth every 4 hours as needed for other (For optimal non-opioid multimodal pain management to improve pain control.), Disp: 100 tablet, Rfl: 0    apixaban ANTICOAGULANT (ELIQUIS) 5 MG tablet, Take 1 tablet (5 mg) by mouth 2 times daily, Disp: 60 tablet, Rfl: 2    ARIPiprazole (ABILIFY) 20 MG tablet, Take 1 tablet by mouth every evening, Disp: , Rfl:     blood glucose (NO BRAND SPECIFIED) test strip, Use to test blood sugar 4 times daily or as directed. accu check guide strips, Disp: 360 strip, Rfl: 3    blood glucose monitoring (NO BRAND SPECIFIED) meter device kit, Use to test blood sugar 4  times daily or as directed. Accuceck Guide meter, Disp: 1 kit, Rfl: 1    blood glucose monitoring (ONE TOUCH ULTRA 2) meter device kit, USE TO TEST BLOOD SUGAR 4 TIMES DAILY OR AS DIRECTED. ACCUCECK GUIDE METER, Disp: , Rfl:     blood glucose monitoring (ULTRA THIN 30G) lancets, Use to test blood sugar 4  times daily or as directed.lancets for acchu check guide, Disp: 400 each, Rfl: 3    buPROPion (WELLBUTRIN SR) 200 MG 12 hr tablet, Take 1 tablet by mouth every evening, Disp: , Rfl:     busPIRone HCl (BUSPAR) 30 MG tablet, TAKE 1 TABLET BY MOUTH TWICE A DAY (Patient taking differently: 30 mg TAKE 1 TABLET BY MOUTH TWICE A DAY (0700 and 2100)), Disp: 60 tablet, Rfl: 5    cetirizine (ZYRTEC) 10 MG tablet, Take 10 mg by mouth daily, Disp: , Rfl:     Continuous Blood Gluc  (FREESTYLE CM 2 READER) GALINA, 1  Dose continuous, Disp: 1 each, Rfl: 0    Continuous Blood Gluc Sensor (FREESTYLE CM 2 SENSOR) MISC, 1 Dose continuous, Disp: 9 each, Rfl: 3    diclofenac (VOLTAREN) 1 % topical gel, Apply 4 g topically 4 times daily (Patient not taking: Reported on 10/9/2023), Disp: 350 g, Rfl: 3    ferrous gluconate (FERGON) 324 (38 Fe) MG tablet, Take 1 tablet (324 mg) by mouth daily (with breakfast), Disp: 90 tablet, Rfl: 3    gabapentin (NEURONTIN) 600 MG tablet, TAKE 2 TABLETS (1,200 MG) BY MOUTH 3 TIMES DAILY FOR 30 DAYS, Disp: 180 tablet, Rfl: 5    glimepiride (AMARYL) 2 MG tablet, Take 2 tablets (4 mg) by mouth 2 times daily (before meals), Disp: 360 tablet, Rfl: 3    insulin aspart (NOVOLOG FLEXPEN) 100 UNIT/ML pen, Using as needed for high blood sugars, Disp: , Rfl:     insulin glargine (LANTUS SOLOSTAR) 100 UNIT/ML pen, Inject 35-42 units at bedtime as directed + 2 units for priming of pen. Max dose 44 units. (Patient taking differently: 20 Units at bedtime Inject 35-42 units at bedtime as directed + 2 units for priming of pen. Max dose 44 units.), Disp: 45 mL, Rfl: 1    insulin pen needle (31G X 5 MM) 31G X 5 MM miscellaneous, Use four times 4 day times a day, Disp: 200 each, Rfl: 3    metFORMIN (GLUCOPHAGE XR) 500 MG 24 hr tablet, Take 2 tablets (1,000 mg) by mouth 2 times daily (with meals) Take two tablets with breakfast and one tablet with dinner for one week then increase to two tablets with breakfast and two tablets with dinner thereafter, Disp: 360 tablet, Rfl: 3    Multiple Vitamin (DAILY-ISAAC MULTIVITAMIN) TABS, TAKE 1 TABLET BY MOUTH EVERY DAY, Disp: 90 tablet, Rfl: 0    naloxone (NARCAN) 4 MG/0.1ML nasal spray, Spray 1 spray (4 mg) into one nostril alternating nostrils once as needed for opioid reversal every 2-3 minutes until assistance arrives, Disp: 0.2 mL, Rfl: 0    nystatin (MYCOSTATIN) 216976 UNIT/GM external powder, Apply 1 dose topically 3 times daily as needed, Disp: 60 g, Rfl: 3    omeprazole  (PRILOSEC) 20 MG DR capsule, TAKE 2 CAPSULES (40 MG) BY MOUTH DAILY *TAKE 30-60 MINUTES BEFORE A MEAL*, Disp: 180 capsule, Rfl: 2    PARoxetine (PAXIL) 40 MG tablet, Take 1 tablet by mouth every evening, Disp: , Rfl:     pioglitazone (ACTOS) 45 MG tablet, Take 1 tablet (45 mg) by mouth daily, Disp: 90 tablet, Rfl: 1    polyethylene glycol (MIRALAX) 17 g packet, Take 17 g by mouth daily, Disp: 7 packet, Rfl: 0    propranolol ER (INDERAL LA) 80 MG 24 hr capsule, TAKE 1 CAPSULE BY MOUTH EVERY DAY, Disp: 90 capsule, Rfl: 3    ramipril (ALTACE) 5 MG capsule, TAKE 1 CAPSULE BY MOUTH EVERY DAY, Disp: 90 capsule, Rfl: 2    rOPINIRole (REQUIP) 0.25 MG tablet, Take 1 tablet (0.25 mg) by mouth every evening, Disp: 90 tablet, Rfl: 0    simvastatin (ZOCOR) 20 MG tablet, TAKE 1 TABLET BY MOUTH EVERYDAY AT BEDTIME, Disp: 90 tablet, Rfl: 2    tiZANidine (ZANAFLEX) 2 MG tablet, TAKE 1 TABLET BY MOUTH THREE TIMES A DAY AS NEEDED FOR MUSCLE SPASM, Disp: 30 tablet, Rfl: 0    traZODone (DESYREL) 50 MG tablet, Take 75 mg by mouth At Bedtime, Disp: , Rfl:     vitamin C (CVS VITAMIN C) 500 MG tablet, 1/2 TABLET BY MOUTH JACOBSON WITH IRON SUPPLEMENT BEFORE MEAL, Disp: 45 tablet, Rfl: 3    VITAMIN D3 25 MCG (1000 UT) tablet, TAKE 1 TABLET BY MOUTH EVERY DAY, Disp: 90 tablet, Rfl: 3    Allergies: Morphine sulfate    Current Status:  KOOS Jr:  UCLA:  Global Mental Health Score - (P) 13  Global Physical Health Score - (P) 12  PROMIS TOTAL - SUBSCORES - (P) 25    Physical Exam:  On physical examination the patient appears the stated age, is in no acute distress, affect is appropriate, and breathing is non-labored.    There were no vitals taken for this visit.  There is no height or weight on file to calculate BMI.    Rises from chair: Easily  Gait: Normal  Appearance: benign  Clinical alignment: Neutral  Effusion: No  Tenderness to palpation: Minimal  Extension: 0  Flexion: 105  Patellar tracking: Normal  Collateral ligaments: intact  Stable in in  the sagittal plane in mid-flexion.    Distally, the circulatory, motor, and sensation exam is intact with 5/5 EHL, gastroc-soleus, and tibialis anterior.  Sensation to light touch is intact.  Dorsalis pedis and posterior tibialis pulses are palpable.  There are no sores on the feet, no bruising, and no lymphedema.  We reviewed the radiographs together. These show the normal progression for a total hip arthroplasty without evidence of loosening or subsidence.       Assessment: 6-week status post right total knee arthroplasty doing very well.  We discussed continuing physical therapy.  We discussed activities on total knee.  We discussed typical follow-up.  Mrea was also nice enough to tell me about her mother's recent .    Plan: Follow-up in 6 weeks sooner if issues.  No ref. provider found

## 2023-11-14 NOTE — LETTER
11/14/2023         RE: Holly Muir  [id number] [address] [facility] [id number]        Dear Colleague,    Thank you for referring your patient, Holly Muir, to the [facility]. Please see a copy of my visit note below.    Chief Complaint: Surgical Followup (6 week post op RTKA 10/2/23)         HPI: Holly Muir returns today in follow-up for her right knee.  She reports she is doing very well.  She is no longer using any pain medication.  She is using a walker.  She is advancing rapidly and physical therapy.  She is very happy with how she is doing.      Medications and allergies are documented in the EMR and have been reviewed.      Current Outpatient Medications:      acetaminophen (TYLENOL) 325 MG tablet, Take 2 tablets (650 mg) by mouth every 4 hours as needed for other (For optimal non-opioid multimodal pain management to improve pain control.), Disp: 100 tablet, Rfl: 0     apixaban ANTICOAGULANT (ELIQUIS) 5 MG tablet, Take 1 tablet (5 mg) by mouth 2 times daily, Disp: 60 tablet, Rfl: 2     ARIPiprazole (ABILIFY) 20 MG tablet, Take 1 tablet by mouth every evening, Disp: , Rfl:      blood glucose (NO BRAND SPECIFIED) test strip, Use to test blood sugar 4 times daily or as directed. accu check guide strips, Disp: 360 strip, Rfl: 3     blood glucose monitoring (NO BRAND SPECIFIED) meter device kit, Use to test blood sugar 4  times daily or as directed. Accuceck Guide meter, Disp: 1 kit, Rfl: 1     blood glucose monitoring (ONE TOUCH ULTRA 2) meter device kit, USE TO TEST BLOOD SUGAR 4 TIMES DAILY OR AS DIRECTED. ACCUCECK GUIDE METER, Disp: , Rfl:      blood glucose monitoring (ULTRA THIN 30G) lancets, Use to test blood sugar 4  times daily or as directed.lancets for acchu check guide, Disp: 400 each, Rfl: 3     buPROPion (WELLBUTRIN SR) 200 MG 12 hr tablet, Take 1 tablet by mouth every evening, Disp: , Rfl:      busPIRone HCl (BUSPAR) 30 MG tablet, TAKE 1 TABLET BY  MOUTH TWICE A DAY (Patient taking differently: 30 mg TAKE 1 TABLET BY MOUTH TWICE A DAY (0700 and 2100)), Disp: 60 tablet, Rfl: 5     cetirizine (ZYRTEC) 10 MG tablet, Take 10 mg by mouth daily, Disp: , Rfl:      Continuous Blood Gluc  (FREESTYLE CM 2 READER) GALINA, 1 Dose continuous, Disp: 1 each, Rfl: 0     Continuous Blood Gluc Sensor (FREESTYLE CM 2 SENSOR) MISC, 1 Dose continuous, Disp: 9 each, Rfl: 3     diclofenac (VOLTAREN) 1 % topical gel, Apply 4 g topically 4 times daily (Patient not taking: Reported on 10/9/2023), Disp: 350 g, Rfl: 3     ferrous gluconate (FERGON) 324 (38 Fe) MG tablet, Take 1 tablet (324 mg) by mouth daily (with breakfast), Disp: 90 tablet, Rfl: 3     gabapentin (NEURONTIN) 600 MG tablet, TAKE 2 TABLETS (1,200 MG) BY MOUTH 3 TIMES DAILY FOR 30 DAYS, Disp: 180 tablet, Rfl: 5     glimepiride (AMARYL) 2 MG tablet, Take 2 tablets (4 mg) by mouth 2 times daily (before meals), Disp: 360 tablet, Rfl: 3     insulin aspart (NOVOLOG FLEXPEN) 100 UNIT/ML pen, Using as needed for high blood sugars, Disp: , Rfl:      insulin glargine (LANTUS SOLOSTAR) 100 UNIT/ML pen, Inject 35-42 units at bedtime as directed + 2 units for priming of pen. Max dose 44 units. (Patient taking differently: 20 Units at bedtime Inject 35-42 units at bedtime as directed + 2 units for priming of pen. Max dose 44 units.), Disp: 45 mL, Rfl: 1     insulin pen needle (31G X 5 MM) 31G X 5 MM miscellaneous, Use four times 4 day times a day, Disp: 200 each, Rfl: 3     metFORMIN (GLUCOPHAGE XR) 500 MG 24 hr tablet, Take 2 tablets (1,000 mg) by mouth 2 times daily (with meals) Take two tablets with breakfast and one tablet with dinner for one week then increase to two tablets with breakfast and two tablets with dinner thereafter, Disp: 360 tablet, Rfl: 3     Multiple Vitamin (DAILY-ISAAC MULTIVITAMIN) TABS, TAKE 1 TABLET BY MOUTH EVERY DAY, Disp: 90 tablet, Rfl: 0     naloxone (NARCAN) 4 MG/0.1ML nasal spray, Spray 1  spray (4 mg) into one nostril alternating nostrils once as needed for opioid reversal every 2-3 minutes until assistance arrives, Disp: 0.2 mL, Rfl: 0     nystatin (MYCOSTATIN) 725551 UNIT/GM external powder, Apply 1 dose topically 3 times daily as needed, Disp: 60 g, Rfl: 3     omeprazole (PRILOSEC) 20 MG DR capsule, TAKE 2 CAPSULES (40 MG) BY MOUTH DAILY *TAKE 30-60 MINUTES BEFORE A MEAL*, Disp: 180 capsule, Rfl: 2     PARoxetine (PAXIL) 40 MG tablet, Take 1 tablet by mouth every evening, Disp: , Rfl:      pioglitazone (ACTOS) 45 MG tablet, Take 1 tablet (45 mg) by mouth daily, Disp: 90 tablet, Rfl: 1     polyethylene glycol (MIRALAX) 17 g packet, Take 17 g by mouth daily, Disp: 7 packet, Rfl: 0     propranolol ER (INDERAL LA) 80 MG 24 hr capsule, TAKE 1 CAPSULE BY MOUTH EVERY DAY, Disp: 90 capsule, Rfl: 3     ramipril (ALTACE) 5 MG capsule, TAKE 1 CAPSULE BY MOUTH EVERY DAY, Disp: 90 capsule, Rfl: 2     rOPINIRole (REQUIP) 0.25 MG tablet, Take 1 tablet (0.25 mg) by mouth every evening, Disp: 90 tablet, Rfl: 0     simvastatin (ZOCOR) 20 MG tablet, TAKE 1 TABLET BY MOUTH EVERYDAY AT BEDTIME, Disp: 90 tablet, Rfl: 2     tiZANidine (ZANAFLEX) 2 MG tablet, TAKE 1 TABLET BY MOUTH THREE TIMES A DAY AS NEEDED FOR MUSCLE SPASM, Disp: 30 tablet, Rfl: 0     traZODone (DESYREL) 50 MG tablet, Take 75 mg by mouth At Bedtime, Disp: , Rfl:      vitamin C (CVS VITAMIN C) 500 MG tablet, 1/2 TABLET BY MOUTH JACOBSON WITH IRON SUPPLEMENT BEFORE MEAL, Disp: 45 tablet, Rfl: 3     VITAMIN D3 25 MCG (1000 UT) tablet, TAKE 1 TABLET BY MOUTH EVERY DAY, Disp: 90 tablet, Rfl: 3    Allergies: Morphine sulfate    Current Status:  JOSE LUIS Jr:  UCLA:  Global Mental Health Score - (P) 13  Global Physical Health Score - (P) 12  PROMIS TOTAL - SUBSCORES - (P) 25    Physical Exam:  On physical examination the patient appears the stated age, is in no acute distress, affect is appropriate, and breathing is non-labored.    There were no vitals taken for this  visit.  There is no height or weight on file to calculate BMI.    Rises from chair: Easily  Gait: Normal  Appearance: benign  Clinical alignment: Neutral  Effusion: No  Tenderness to palpation: Minimal  Extension: 0  Flexion: 105  Patellar tracking: Normal  Collateral ligaments: intact  Stable in in the sagittal plane in mid-flexion.    Distally, the circulatory, motor, and sensation exam is intact with 5/5 EHL, gastroc-soleus, and tibialis anterior.  Sensation to light touch is intact.  Dorsalis pedis and posterior tibialis pulses are palpable.  There are no sores on the feet, no bruising, and no lymphedema.  We reviewed the radiographs together. These show the normal progression for a total hip arthroplasty without evidence of loosening or subsidence.       Assessment: 6-week status post right total knee arthroplasty doing very well.  We discussed continuing physical therapy.  We discussed activities on total knee.  We discussed typical follow-up.  Mera was also nice enough to tell me about her mother's recent .    Plan: Follow-up in 6 weeks sooner if issues.  No ref. provider found      Again, thank you for allowing me to participate in the care of your patient.        Sincerely,        Sanford Sheehan MD   0 = independent

## 2023-11-16 ENCOUNTER — VIRTUAL VISIT (OUTPATIENT)
Dept: HEMATOLOGY | Facility: CLINIC | Age: 68
End: 2023-11-16
Attending: PHYSICIAN ASSISTANT
Payer: COMMERCIAL

## 2023-11-16 DIAGNOSIS — Z90.49 S/P PARTIAL COLECTOMY: ICD-10-CM

## 2023-11-16 DIAGNOSIS — D50.8 IRON DEFICIENCY ANEMIA SECONDARY TO INADEQUATE DIETARY IRON INTAKE: Primary | ICD-10-CM

## 2023-11-16 DIAGNOSIS — Z93.3 S/P COLOSTOMY (H): ICD-10-CM

## 2023-11-16 DIAGNOSIS — Z86.711 HISTORY OF PULMONARY EMBOLISM: ICD-10-CM

## 2023-11-16 PROCEDURE — 99213 OFFICE O/P EST LOW 20 MIN: CPT | Mod: VID | Performed by: PHYSICIAN ASSISTANT

## 2023-11-16 NOTE — PATIENT INSTRUCTIONS
Holly,    It was nice to see you via video today.     Below is a summary of our plan:  Please discontinue taking your apixaban (Eliquis) today.   One of our nurses will call you in the next 1-2 days to coordinate to get you scheduled to get some labs done in the next 1-2 weeks.   I will contact you once the lab tests are back.   At this time, I have no further plans to see you back on a routine basis.   If you should have any further questions, please call us at [phone] and ask to speak to a nursing staff.    Thank you once again in choosing our clinic as part of your healthcare team.      Carter Lan PA-C, MPAS  Physician Assistant  [facility].

## 2023-11-17 ENCOUNTER — THERAPY VISIT (OUTPATIENT)
Dept: PHYSICAL THERAPY | Facility: CLINIC | Age: 68
End: 2023-11-17
Payer: COMMERCIAL

## 2023-11-17 DIAGNOSIS — Z96.651 S/P TOTAL KNEE ARTHROPLASTY, RIGHT: Primary | ICD-10-CM

## 2023-11-17 PROCEDURE — 97530 THERAPEUTIC ACTIVITIES: CPT | Mod: GP | Performed by: PHYSICAL THERAPIST

## 2023-11-17 PROCEDURE — 97110 THERAPEUTIC EXERCISES: CPT | Mod: 59 | Performed by: PHYSICAL THERAPIST

## 2023-11-21 ENCOUNTER — VIRTUAL VISIT (OUTPATIENT)
Dept: PSYCHIATRY | Facility: CLINIC | Age: 68
End: 2023-11-21
Payer: COMMERCIAL

## 2023-11-21 DIAGNOSIS — F41.1 GAD (GENERALIZED ANXIETY DISORDER): ICD-10-CM

## 2023-11-21 DIAGNOSIS — F51.04 PSYCHOPHYSIOLOGICAL INSOMNIA: ICD-10-CM

## 2023-11-21 DIAGNOSIS — F25.1 SCHIZOAFFECTIVE DISORDER, DEPRESSIVE TYPE (H): Primary | ICD-10-CM

## 2023-11-21 PROCEDURE — 99204 OFFICE O/P NEW MOD 45 MIN: CPT | Mod: 95 | Performed by: NURSE PRACTITIONER

## 2023-11-21 RX ORDER — PAROXETINE 40 MG/1
40 TABLET, FILM COATED ORAL EVERY EVENING
Qty: 30 TABLET | Refills: 3 | Status: SHIPPED | OUTPATIENT
Start: 2023-11-21 | End: 2024-01-11

## 2023-11-21 RX ORDER — ARIPIPRAZOLE 20 MG/1
20 TABLET ORAL EVERY EVENING
Qty: 30 TABLET | Refills: 3 | Status: SHIPPED | OUTPATIENT
Start: 2023-11-21 | End: 2024-01-11

## 2023-11-21 RX ORDER — TRAZODONE HYDROCHLORIDE 50 MG/1
50 TABLET, FILM COATED ORAL AT BEDTIME
Qty: 30 TABLET | Refills: 3 | Status: SHIPPED | OUTPATIENT
Start: 2023-11-21 | End: 2024-01-10

## 2023-11-21 RX ORDER — MIRTAZAPINE 7.5 MG/1
7.5 TABLET, FILM COATED ORAL AT BEDTIME
Qty: 30 TABLET | Refills: 3 | Status: SHIPPED | OUTPATIENT
Start: 2023-11-21 | End: 2024-01-11

## 2023-11-21 RX ORDER — BUPROPION HYDROCHLORIDE 200 MG/1
200 TABLET, EXTENDED RELEASE ORAL
Qty: 30 TABLET | Refills: 3 | Status: SHIPPED | OUTPATIENT
Start: 2023-11-21 | End: 2024-01-11

## 2023-11-21 RX ORDER — BUSPIRONE HYDROCHLORIDE 30 MG/1
TABLET ORAL
Qty: 60 TABLET | Refills: 3 | Status: SHIPPED | OUTPATIENT
Start: 2023-11-21 | End: 2024-01-11

## 2023-11-21 ASSESSMENT — ANXIETY QUESTIONNAIRES
5. BEING SO RESTLESS THAT IT IS HARD TO SIT STILL: NEARLY EVERY DAY
GAD7 TOTAL SCORE: 20
GAD7 TOTAL SCORE: 20
1. FEELING NERVOUS, ANXIOUS, OR ON EDGE: MORE THAN HALF THE DAYS
2. NOT BEING ABLE TO STOP OR CONTROL WORRYING: NEARLY EVERY DAY
3. WORRYING TOO MUCH ABOUT DIFFERENT THINGS: NEARLY EVERY DAY
IF YOU CHECKED OFF ANY PROBLEMS ON THIS QUESTIONNAIRE, HOW DIFFICULT HAVE THESE PROBLEMS MADE IT FOR YOU TO DO YOUR WORK, TAKE CARE OF THINGS AT HOME, OR GET ALONG WITH OTHER PEOPLE: VERY DIFFICULT
4. TROUBLE RELAXING: NEARLY EVERY DAY
6. BECOMING EASILY ANNOYED OR IRRITABLE: NEARLY EVERY DAY
7. FEELING AFRAID AS IF SOMETHING AWFUL MIGHT HAPPEN: NEARLY EVERY DAY

## 2023-11-21 ASSESSMENT — PATIENT HEALTH QUESTIONNAIRE - PHQ9
SUM OF ALL RESPONSES TO PHQ QUESTIONS 1-9: 16
10. IF YOU CHECKED OFF ANY PROBLEMS, HOW DIFFICULT HAVE THESE PROBLEMS MADE IT FOR YOU TO DO YOUR WORK, TAKE CARE OF THINGS AT HOME, OR GET ALONG WITH OTHER PEOPLE: SOMEWHAT DIFFICULT
SUM OF ALL RESPONSES TO PHQ QUESTIONS 1-9: 16

## 2023-11-21 NOTE — PROGRESS NOTES
"                                                         Virtual Visit Details    Type of service:  Video Visit   Video Start Time: 10:18 AM  Video End Time:11:10 AM    Originating Location (pt. Location): Home  {PROVIDER LOCATION On-site should be selected for visits conducted from your clinic location or adjoining [facility] hospital, academic office, or other nearby [facility] building. Off-site should be selected for all other provider locations, including home:891219}  Distant Location (provider location):  Off-site  Platform used for Video Visit: [facility]        Outpatient Psychiatric Evaluation - Standard Adult    Name:  Holly Muir  : 1955    Source of Referral:  Primary Care Provider: Tiffany King MD PhD   Last visit: September 15, 2023  Current Psychotherapist: None    Identifying Data:  Patient is a 68 year old, single  White Not  or  female  who presents for initial visit with me.  Patient is currently retired. Patient attended the session alone. Consent to communicate signed for no one. Consent for treatment signed and included in electronic medical record. Discussed limits of confidentiality today. My Practice Policy was reviewed and signed.     Patient prefers to be called: Holly     Chief Complaint:    Chief Complaint   Patient presents with    Consult         HPI:      ***  Psychiatric Review of Symptoms:  Depression: {[facility] DEPRESSION SXS:891153}   PHQ-9 scores:       2023     7:45 AM 9/15/2023     2:43 PM 2023     9:41 AM   PHQ-9 SCORE   PHQ-9 Total Score MyChart  14 (Moderate depression) 16 (Moderately severe depression)   PHQ-9 Total Score 17 14 16     Jeana:  {[facility] JEANA SXS:310496::\"No symptoms\"}   MDQ Score: {[facility] MDQ:199230}  Anxiety: [facility]8709[facility]}    ISABELA-7 scores:        2023    10:08 AM 2023     9:49 AM 2023     9:43 AM   ISABELA-7 SCORE   Total Score 17 (severe anxiety) 8 (mild anxiety) 20 (severe anxiety)   Total Score 17 8 20 " "    Panic:  {PANIC:081007}   Agoraphobia:  {YES / NO:454262::\"Yes\"}   PTSD:  {[facility] PTSD SXS:201829::\"No symptoms\"}   OCD:  {[facility] OCD SXS:237363::\"No symptoms\"}   Psychosis: {PSYCHOSIS:263561::\"No symptoms\"}   ADD / ADHD: {[facility] ADD SXS:374238::\"No symptoms\"}  Gambling or shoplifting: {YES / NO:519580::\"No\"}   Eating Disorder:  {[facility] ED SXS:413618}  Sleep:   {[facility] SLEEP:765084}     Psychiatric History:   Hospitalizations:***  History of Commitment? {YES / NO:056774::\"No\"}   Past Treatment: {[facility] MENTAL HEALTH SERVICES:049111}  Suicide Attempts:  ***  Self-injurious Behavior: {vtsib:967421}  Electroconvulsive Therapy (ECT) or Transcranial Magnetic Stimulation (TMS): {YES / NO:276390::\"No\"}   Genetic Testing: {YES / NO:097549::\"No\"}     Substance Use History:  Current use of drugs or alcohol: {[facility] SUBSTANCES:476153}   Patient {[facility] DRINKING PROBLEMS:301203}.   Based on the clinical interview, there  {Indications:963534}.  Tobacco use: {[facility] YES/NO:666759}  Caffeine: {[facility] YES/NO CAFFEINE:945741}  Patient {[facility] RECEIVED CHEMICAL DEPENDENCY TREATMENT:991468}  Recovery Programming Involvement: {vtrecovery:369197}    Past Medical History:  Past Medical History:   Diagnosis Date    Bilateral knee pain     Cataracts, both eyes 5/8/2013    Cervical cancer (H)     Chronic kidney disease, stage 3a (H) 11/17/2021    Diabetes     Diabetic retinopathy (H)     HTN     Inflammatory arthritis     Major depression     Memory loss     Nephropathy     OA (osteoarthritis) of knee 9/11/2013    Other and unspecified hyperlipidemia     Pterygium eye     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      Surgery:   Past Surgical History:   Procedure Laterality Date    ARTHROPLASTY KNEE Right 10/2/2023    Procedure: ARTHROPLASTY, RIGHT KNEE, TOTAL;  Surgeon: Sanford Sheehan MD;  Location: [facility] OR    ARTHROSCOPY KNEE RT/LT      LT    BLEPHAROPLASTY BILATERAL Bilateral 8/16/2019    Procedure: BILATERAL UPPER LID BLEPHAROPLASTY;  Surgeon: " Randolph Cook MD;  Location: [facility] OR    BREAST LUMPECTOMY, RT/LT      LT-benign     CATARACT IOL, RT/LT      COLONOSCOPY  5/10/2012    Procedure:COLONOSCOPY; screening colonoscopy; Surgeon:MILLA VEGA; Location:[facility] OR    COLONOSCOPY WITH CO2 INSUFFLATION N/A 8/2/2019    Procedure: Colonoscopy with CO2 insufflation;  Surgeon: Himanshu Hi MD;  Location: [facility] OR    COLOSTOMY  2000    COLOSTOMY      HYSTERECTOMY, PAP NO LONGER INDICATED      done in [place]-cervical cancer    IMPLANT STIMULATOR SACRAL NERVE STAGE ONE Right 3/10/2015    Procedure: IMPLANT STIMULATOR SACRAL NERVE STAGE ONE;  Surgeon: Teodora Yusuf MD;  [facility]    IMPLANT STIMULATOR SACRAL NERVE STAGE TWO Right 3/31/2015    Procedure: IMPLANT STIMULATOR SACRAL NERVE STAGE TWO;  Surgeon: Teodora Yusuf MD;  [facility]    IMPLANT STIMULATOR SACRAL NERVE STAGE TWO Right 6/22/2020    Procedure: INSERTION, SACRAL NERVE STIMULATOR, STAGE 2 (replacement of interstim battery);  Surgeon: Teodora Yusuf MD;  Location[facility]    INJECT EPIDURAL TRANSFORAMINAL Right 1/20/2023    Procedure: Right Lumbar 5-Sacral 1 Transforaminal Epidural Steroid Injection with fluoroscopic guidance and contrast;  Surgeon: Kulwinder Booth MD;  Location: [facility] OR    INJECT EPIDURAL TRANSFORAMINAL Right 3/23/2023    Procedure: Right Lumbar 5-Sacral 1 Transforaminal Epidural Steroid Injection with fluoroscopic guidance and contrast.;  Surgeon: Kulwinder Booth MD;  Location: [facility] OR    PHACOEMULSIFICATION WITH STANDARD INTRAOCULAR LENS IMPLANT Left 9/26/2019    Procedure: LEFT PHACOEMULSIFICATION, CATARACT, WITH STANDARD IOL INSERTION;  Surgeon: Francesco Winn MD;  [facility]    PHACOEMULSIFICATION WITH STANDARD INTRAOCULAR LENS IMPLANT Right 10/24/2019    Procedure: RIGHT PHACOEMULSIFICATION, CATARACT, WITH STANDARD IOL INSERTION;  Surgeon: Francesco Winn MD;  Location: [facility]    REPAIR PTOSIS Bilateral 08/16/2019     "SALPINGO OOPHORECTOMY,R/L/JENNIFER      Salpingo Oophorectomy, RT/LT/JENNIFER    [facility] EXCIS PTERYGIUM  10/08    [facility]    [facility] STOMACH SURGERY PROCEDURE UNLISTED      [facility] COLONOSCOPY THRU STOMA, DIAGNOSTIC  2002    normal per report-no records available-records destroyed     Allergies:     Allergies   Allergen Reactions    Morphine Sulfate Itching     Primary Care Provider: Tiffany King MD PhD  Seizures or Head Injury: {[facility] YES/NO HEAD INJURY:962975}  Diet: {[facility] DIET:208886}  Food Allergies: {YES / NO:982677::\"No\"}   Exercise: {[facility] EXERCISE:740029}  Supplements: Reviewed per Electronic Medical Record Today    acetaminophen (TYLENOL) 325 MG tablet, Take 2 tablets (650 mg) by mouth every 4 hours as needed for other (For optimal non-opioid multimodal pain management to improve pain control.)  apixaban ANTICOAGULANT (ELIQUIS) 5 MG tablet, Take 1 tablet (5 mg) by mouth 2 times daily (Patient not taking: Reported on 11/21/2023)  ARIPiprazole (ABILIFY) 20 MG tablet, Take 1 tablet by mouth every evening  blood glucose (NO BRAND SPECIFIED) test strip, Use to test blood sugar 4 times daily or as directed. accu check guide strips  blood glucose monitoring (NO BRAND SPECIFIED) meter device kit, Use to test blood sugar 4  times daily or as directed. Accuceck Guide meter  blood glucose monitoring (ONE TOUCH ULTRA 2) meter device kit, USE TO TEST BLOOD SUGAR 4 TIMES DAILY OR AS DIRECTED. ACCUCECK GUIDE METER  blood glucose monitoring (ULTRA THIN 30G) lancets, Use to test blood sugar 4  times daily or as directed.lancets for acchu check guide  buPROPion (WELLBUTRIN SR) 200 MG 12 hr tablet, Take 1 tablet by mouth every evening  busPIRone HCl (BUSPAR) 30 MG tablet, TAKE 1 TABLET BY MOUTH TWICE A DAY (Patient taking differently: 30 mg TAKE 1 TABLET BY MOUTH TWICE A DAY (0700 and 2100))  cetirizine (ZYRTEC) 10 MG tablet, Take 10 mg by mouth daily  Continuous Blood Gluc  (FREESTYLE CM 2 READER) GALINA, 1 Dose " continuous  Continuous Blood Gluc Sensor (FREESTYLE CM 2 SENSOR) MISC, 1 Dose continuous  diclofenac (VOLTAREN) 1 % topical gel, Apply 4 g topically 4 times daily (Patient not taking: Reported on 10/9/2023)  ferrous gluconate (FERGON) 324 (38 Fe) MG tablet, Take 1 tablet (324 mg) by mouth daily (with breakfast)  gabapentin (NEURONTIN) 600 MG tablet, TAKE 2 TABLETS (1,200 MG) BY MOUTH 3 TIMES DAILY FOR 30 DAYS  glimepiride (AMARYL) 2 MG tablet, Take 2 tablets (4 mg) by mouth 2 times daily (before meals)  insulin aspart (NOVOLOG FLEXPEN) 100 UNIT/ML pen, Using as needed for high blood sugars  insulin glargine (LANTUS SOLOSTAR) 100 UNIT/ML pen, Inject 35-42 units at bedtime as directed + 2 units for priming of pen. Max dose 44 units. (Patient taking differently: 20 Units at bedtime Inject 35-42 units at bedtime as directed + 2 units for priming of pen. Max dose 44 units.)  insulin pen needle (31G X 5 MM) 31G X 5 MM miscellaneous, Use four times 4 day times a day  metFORMIN (GLUCOPHAGE XR) 500 MG 24 hr tablet, Take 2 tablets (1,000 mg) by mouth 2 times daily (with meals) Take two tablets with breakfast and one tablet with dinner for one week then increase to two tablets with breakfast and two tablets with dinner thereafter  Multiple Vitamin (DAILY-ISAAC MULTIVITAMIN) TABS, TAKE 1 TABLET BY MOUTH EVERY DAY  naloxone (NARCAN) 4 MG/0.1ML nasal spray, Spray 1 spray (4 mg) into one nostril alternating nostrils once as needed for opioid reversal every 2-3 minutes until assistance arrives  nystatin (MYCOSTATIN) 722470 UNIT/GM external powder, Apply 1 dose topically 3 times daily as needed  omeprazole (PRILOSEC) 20 MG DR capsule, TAKE 2 CAPSULES (40 MG) BY MOUTH DAILY *TAKE 30-60 MINUTES BEFORE A MEAL*  PARoxetine (PAXIL) 40 MG tablet, Take 1 tablet by mouth every evening  pioglitazone (ACTOS) 45 MG tablet, Take 1 tablet (45 mg) by mouth daily  polyethylene glycol (MIRALAX) 17 g packet, Take 17 g by mouth daily  propranolol  ER (INDERAL LA) 80 MG 24 hr capsule, TAKE 1 CAPSULE BY MOUTH EVERY DAY  ramipril (ALTACE) 5 MG capsule, TAKE 1 CAPSULE BY MOUTH EVERY DAY  rOPINIRole (REQUIP) 0.25 MG tablet, Take 1 tablet (0.25 mg) by mouth every evening  simvastatin (ZOCOR) 20 MG tablet, TAKE 1 TABLET BY MOUTH EVERYDAY AT BEDTIME  tiZANidine (ZANAFLEX) 2 MG tablet, TAKE 1 TABLET BY MOUTH THREE TIMES A DAY AS NEEDED FOR MUSCLE SPASM  traZODone (DESYREL) 50 MG tablet, Take 75 mg by mouth At Bedtime  vitamin C ([employer] VITAMIN C) 500 MG tablet, 1/2 TABLET BY MOUTH JACOBSON WITH IRON SUPPLEMENT BEFORE MEAL  VITAMIN D3 25 MCG (1000 UT) tablet, TAKE 1 TABLET BY MOUTH EVERY DAY    No current facility-administered medications on file prior to visit.       The [place] Prescription Monitoring Program has been reviewed and there are no concerns about diversionary activity for controlled substances at this time.  No data for controlled substances over the last one year. ***    Vital Signs:  Vitals: There were no vitals taken for this visit.    Labs:  Most recent laboratory results reviewed and the pertinent results include: ***  Most recent labs reviewed and no new labs.   {Blank[employer] Multiple Select:481516}    Review of Systems:  10 systems (general, cardiovascular, respiratory, eyes, ENT, endocrine, GI, , M/S, neurological) were reviewed. Most pertinent finding(s) is/are: *** . The remaining systems are all unremarkable.  Family History:   Patient reported family history includes:   Family History   Problem Relation Age of Onset    Diabetes Mother     Cerebrovascular Disease Mother     Diabetes Father     Hypertension Father     Cancer Maternal Grandfather     Cancer Paternal Grandfather     Diabetes Brother     Diabetes Sister     Thyroid Disease Daughter     Thyroid Disease Sister     Multiple Sclerosis Daughter     Glaucoma No family hx of     Macular Degeneration No family hx of      Mental Illness History: {[facility] SI HX:913574}  Substance Abuse History:  {[facility] SI HX:605118}  Suicide History: {[facility] SI HX:407693}  Medications: {LARRY:712567}     Social History:     See [facility] assessment for further information    Born: ***  Raised: ***  Childhood: ***  Siblings:  ***  Highest education level was {[facility] EDUCATIONAL LEVEL:972863}.   Employment History:  ***  Childhood illnesses: {vtillness:168825}  Current Living situation:  ***  with *** . Feels safe at home.  Children: ***   Firearms/Weapons Access: {[facility] YES/NO FIREARM:190117}   Service: {[facility] YES/NO :608579}    Mental Status Examination:  Appearance: {VTappearance:579918}  Attitude: {vtattitude:261654}  Eye Contact:  {vteyecontact:285035}  Gait and Station: {vtgait1:224876}  Psychomotor Behavior:  { :875826}  Oriented to:  { :099243}  Attention Span and Concentration:  {:520805}  Speech:   {vtspeech:106738:::1}  Mood:  { :947952:::1}  Affect:  { :759485:::1}  Associations:  { :902664}  Thought Process:  { :574828}  Thought Content:  {vt content:467132}  Recent and Remote Memory:  { :053560} Not formally assessed. No amnesia.  Fund of Knowledge: { :298980}  Insight:  { :077037}  Judgment: { :015418}  Impulse Control:  { :171415}    Suicide Risk Assessment:  Today Holly Muir reports *** thoughts to harm themself or others. In addition, there are notable risk factors for self-harm, including {vt suicide risks:346797}. However, risk is mitigated by {vt suicide protect:666609}. Therefore, based on all available evidence including the factors cited above, Holly Muir does not appear to be at imminent risk for self-harm, does not meet criteria for a 72-hr hold, and therefore remains appropriate for ongoing outpatient level of care.  A thorough assessment of risk factors related to suicide and self-harm have been reviewed and are noted above. The patient convincingly denies suicidality on several occasions. Local community safety resources printed and reviewed for patient to use if needed. There was  no deceit detected, and the patient presented in a manner that was believable.     DSM5 Diagnosis:  {vtDSMdiagnosis:996851}    Medical Comorbidities Include:   Patient Active Problem List    Diagnosis Date Noted    S/P total knee arthroplasty, right 11/03/2023     Priority: Medium    Osteoarthritis of right knee, unspecified osteoarthritis type 10/02/2023     Priority: Medium    F11.2 - Continuous opioid dependence (H) 09/15/2023     Priority: Medium    Noncompliance with medication regimen 09/07/2023     Priority: Medium     overuse of oxycodone, not taking gabapentin as scheduled.      Iron deficiency anemia secondary to inadequate dietary iron intake 08/04/2023     Priority: Medium    Other acute pulmonary embolism without acute cor pulmonale (H) 05/18/2023     Priority: Medium    Pneumonia 05/07/2023     Priority: Medium    Chronic right shoulder pain 02/17/2023     Priority: Medium    Chronic kidney disease, stage 3a (H) 11/17/2021     Priority: Medium    Restless leg syndrome 09/22/2021     Priority: Medium    Tremor 09/22/2021     Priority: Medium    Unspecified inflammatory spondylopathy, lumbar region (H24) 09/16/2021     Priority: Medium    Fecal urgency 05/20/2020     Priority: Medium     Added automatically from request for surgery [id number]      Pseudophakia of right eye 10/25/2019     Priority: Medium    Anal stenosis 06/27/2019     Priority: Medium    S/P partial colectomy 06/27/2019     Priority: Medium    GERD (gastroesophageal reflux disease) 01/23/2019     Priority: Medium    Anemia 01/22/2019     Priority: Medium    Chest pain, musculoskeletal 01/22/2019     Priority: Medium    Diabetic neuropathy (H) 01/22/2019     Priority: Medium    Hyponatremia 01/22/2019     Priority: Medium    Schizoaffective disorder (H) 01/22/2019     Priority: Medium    Obesity (BMI 35.0-39.9) with comorbidity (H) 11/14/2018     Priority: Medium    Visual hallucination 04/21/2016     Priority: Medium    Type 2 diabetes  mellitus with hyperglycemia, with long-term current use of insulin (H) 12/23/2015     Priority: Medium    Fecal incontinence due to anorectal disorder 09/28/2015     Priority: Medium    Anxiety 09/02/2015     Priority: Medium    Bilateral low back pain without sciatica 06/17/2015     Priority: Medium    Urinary retention 01/22/2015     Priority: Medium    Colostomy, evaluate (H) 10/17/2014     Priority: Medium    Encounter for long-term (current) use of insulin (H) 10/12/2014     Priority: Medium    Hypoglycemia due to insulin 09/17/2014     Priority: Medium    Rotator cuff impingement syndrome 08/07/2014     Priority: Medium    Health Care Home 09/20/2013     Priority: Medium     Date:  3-4-16  Status:  Accepted          Cataracts, both eyes 05/08/2013     Priority: Medium    Dermatochalasis 05/10/2012     Priority: Medium    Presbyopia 05/10/2012     Priority: Medium    OCD (obsessive compulsive disorder) 05/10/2012     Priority: Medium    HTN, goal below 140/90 05/10/2012     Priority: Medium    Pain in joint, hand 01/25/2012     Priority: Medium    Trigger finger, acquired 01/25/2012     Priority: Medium     Problem list name updated by automated process. Provider to review      Synovitis and tenosynovitis 01/25/2012     Priority: Medium     Problem list name updated by automated process. Provider to review      Mixed incontinence urge and stress 06/01/2010     Priority: Medium    Memory loss 06/01/2010     Priority: Medium     Diabetes coma around 2003      S/P colostomy (H) 04/03/2010     Priority: Medium     Due to injury to colon while she had a hysterectomy (age late 30s):  Seen Dr. Doherty in colorectal surgery at [facility]-does not want to operated. Needs her social support and memory loss issues be resolved first.       History of cervical cancer 09/23/2009     Priority: Medium     status post NATALIE/BSO. Being followed by Dr. Angelita Addison at [facility] gyn/onc      Severe major  depression with psychotic features (H) 07/31/2009     Priority: Medium     Psychiatrist: Dr. Perkins at [facility].   Neuropsychological evalation at [facility] on 12/1/09: lower end cognitive functioning and multifactorial in etiologies including effects of psychosis, medications nd perhaps non-restorative sleep. Also has features of OCD. Recommend psychiatry referral for further evaluation of past Dx of schizophrenia and also psychotherapy.   Sees a therapist monthly now (has met goals) but will monitor for relapse       Hyperlipidemia LDL goal <100 01/27/2009     Priority: Medium    Primary generalized (osteo)arthritis 01/29/2014     Priority: Low       The Patient Activation Measure (RADHA) score was completed and recorded in EPIC. This assesses patient knowledge, skill, and confidence for self-management.       9/15/2011    11:00 AM 1/5/2012    11:00 AM   RADHA Score (Last Two)   RADHA Raw Score 30 35   Activation Score 37.3 45.2   RADHA Level 1 1                Impression:  Holly Muir ***. Medication side effects and alternatives reviewed. Health promotion activities recommended and reviewed today. All questions addressed. Education and counseling completed regarding risks and benefits of medications and psychotherapy options. Collaborative Care Psychiatry Service model reviewed today. Recommend therapy for additional support.     Treatment Plan:     1.  Decrease trazodone to 50 mg at bedtime  2.  Add mirtazapine 7.5 mg at bedtime  3.  Change Wellbutrin SR to take in the morning at 200 mg daily  4.  Continue buspirone 30 mg 2 times daily  5.  Continue Abilify 20 mg daily  6.  Continue paroxetine 40 mg at bedtime    Continue all other medical directions per primary care provider.   Continue all other medications as reviewed per electronic medical record today.   Safety plan reviewed. To the Emergency Department as needed or call after hours crisis line at [phone] or  [phone]. [place] Crisis Text [place]: Text [place] to 819877  or  Suicide LifeLine Chat: suicidePriceArea.org/chat/  To schedule individual or family therapy, call [facility] at [phone].   Schedule an appointment with me in 6 weeks or sooner as needed.  Call [facility] at [phone] to schedule.  Follow up with primary care provider as planned or for acute medical concerns.  Call the psychiatric nurse line with medication questions or concerns at [phone].  [employer] may be used to communicate with your provider, but this is not intended to be used for emergencies.    Crisis Resources:    National Suicide Prevention Lifeline: 466.714.7715 (TTY: 588.265.5679). Call anytime for help.  (www.suicidepreventionGewaraline.org)  National [place] on Mental Illness (www.haja.org): 244.276.4771 or 539-695-2391.   Mental Health Association (www.mentalhealth.org): 684.924.2970 or 614-702-7670.  [place] Crisis Text Line: Text [place] to 213251  Suicide LifeLine Chat: suicidePriceArea.org/chat    Administrative Billing:   Time spent with patient included counseling and coordination of care regarding above diagnoses and treatment plan.    Patient Status:  This is a continuous care patient and medications will be prescribed by the psychiatric provider until further indicated.    Signed:   [facility]   Psychiatry  Answers submitted by the patient for this visit:  Patient Health Questionnaire (Submitted on 11/21/2023)  If you checked off any problems, how difficult have these problems made it for you to do your work, take care of things at home, or get along with other people?: Somewhat difficult  PHQ9 TOTAL SCORE: 16  ISABELA-7 (Submitted on 11/21/2023)  ISABELA 7 TOTAL SCORE: 20

## 2023-11-21 NOTE — PATIENT INSTRUCTIONS
"Patient Education   The [facility]  What to Expect  Here's what to expect in the [facility] Program.   About the program  You'll be meeting with a psychiatric doctor to check your mental health. A psychiatric doctor helps you deal with troubling thoughts and feelings by giving you medicine. They'll make sure you know the plan for your care. You may see them for a long time. When you're feeling better, they may refer you back to seeing your family doctor.   If you have any questions, we'll be glad to talk to you.  About visits  Be open  At your visits, please talk openly about your problems. It may feel hard, but it's the best way for us to help you.  Cancelling visits  If you can't come to your visit, please call us right away at [phone]. If you don't cancel at least 24 hours (1 full day) before your visit, that's \"late cancellation.\"  Not showing up for your visits  Being very late is the same as not showing up. You'll be a \"no show\" if:  You're more than 15 minutes late for a 30-minute (half hour) visit.  You're more than 30 minutes late for a 60-minute (full hour) visit.  If you cancel late or don't show up 2 times within 6 months, we may end your care.  Getting help between visits  If you need help between visits, you can call us Monday to Friday from 8 a.m. to 4:30 p.m. at [phone].  Emergency care  Call 911 or go to the nearest emergency department if your life or someone else's life is in danger.  Call 988 anytime to reach the national Suicide and Crisis hotline.  Medicine refills  To refill your medicine, call your pharmacy. You can also call [facility]'s [facility] at [phone], Monday to Friday, 8 a.m. to 4:30 p.m. It can take 1 to 3 business days to get a refill.   Forms, letters, and tests  You may have papers to fill out, like FMLA, short-term disability, and workability. We can help you with these forms at your visits, but you must have an " appointment. You may need more than 1 visit for this, to be in an intensive therapy program, or both.  Before we can give you medicine for ADHD, we may refer you to get tested for it or confirm it another way.  We may not be able to give you an emotional support animal letter.  We don't do mental health checks ordered by the court.   We don't do mental health testing, but we can refer you to get tested.   Thank you for choosing us for your care.  For informational purposes only. Not to replace the advice of your health care provider. Copyright   2022 [facility]. All rights reserved. [employer] 138120 - 12/22.       Treatment Plan:     1.  Decrease trazodone to 50 mg at bedtime  2.  Add mirtazapine 7.5 mg at bedtime  3.  Change Wellbutrin SR to take in the morning at 200 mg daily  4.  Continue buspirone 30 mg 2 times daily  5.  Continue Abilify 20 mg daily  6.  Continue paroxetine 40 mg at bedtime    Continue all other medical directions per primary care provider.   Continue all other medications as reviewed per electronic medical record today.   Safety plan reviewed. To the Emergency Department as needed or call after hours crisis line at [phone] or [phone]. [place] Crisis Text Line: Text [place] to 980374  or  Suicide LifeLine Chat: suicidepreventionlifeline.org/chat/  To schedule individual or family therapy, call [facility] at [phone].   Schedule an appointment with me in 6 weeks or sooner as needed.  Call [facility] at [phone] to schedule.  Follow up with primary care provider as planned or for acute medical concerns.  Call the psychiatric nurse line with medication questions or concerns at [phone].  [employer] may be used to communicate with your provider, but this is not intended to be used for emergencies.    Crisis Resources:    National Suicide Prevention Lifeline: 215.604.5187 (TTY: 237.107.4696). Call anytime for help.   (www.suicidepreventionlifeline.org)  National [place] [employer] (www.haja.org): 724-001-0561 or 169-004-5973.   Mental Health Association (www.mentalhealth.org): 150.347.7291 or 775-069-7561.  [place] Crisis Text Line: Text [place] to 642345  Suicide LifeLine Chat: suicidepreventionLamellar Biomedicalline.org/chat

## 2023-11-21 NOTE — NURSING NOTE
Is the patient currently in the state of [place]? YES    Visit mode:VIDEO    If the visit is dropped, the patient can be reconnected by: VIDEO VISIT: Text to cell phone:   Telephone Information:   Mobile [phone]       Will anyone else be joining the visit? NO  (If patient encounters technical issues they should call [phone])    How would you like to obtain your AVS? MyChart    Are changes needed to the allergy or medication list? Yes Pt no longer taking Eliquis and Pt stated no changes to allergies    Reason for visit: Consult    Mikayla Lawrence VVF
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
## 2023-11-25 DIAGNOSIS — J30.2 OTHER SEASONAL ALLERGIC RHINITIS: ICD-10-CM

## 2023-11-25 DIAGNOSIS — G25.81 RESTLESS LEG SYNDROME: ICD-10-CM

## 2023-11-25 DIAGNOSIS — Z86.711 HISTORY OF PULMONARY EMBOLISM: ICD-10-CM

## 2023-11-27 RX ORDER — ROPINIROLE 0.25 MG/1
0.25 TABLET, FILM COATED ORAL EVERY EVENING
Qty: 90 TABLET | Refills: 0 | OUTPATIENT
Start: 2023-11-27

## 2023-11-27 RX ORDER — CETIRIZINE HYDROCHLORIDE 10 MG/1
10 TABLET ORAL DAILY
Qty: 90 TABLET | Refills: 1 | Status: SHIPPED | OUTPATIENT
Start: 2023-11-27 | End: 2024-02-07

## 2023-11-27 RX ORDER — APIXABAN 5 MG/1
5 TABLET, FILM COATED ORAL 2 TIMES DAILY
Qty: 60 TABLET | Refills: 2 | Status: SHIPPED | OUTPATIENT
Start: 2023-11-27 | End: 2024-02-07

## 2023-11-28 ENCOUNTER — TELEPHONE (OUTPATIENT)
Dept: ORTHOPEDICS | Facility: CLINIC | Age: 68
End: 2023-11-28
Payer: COMMERCIAL

## 2023-11-28 ENCOUNTER — TELEPHONE (OUTPATIENT)
Dept: FAMILY MEDICINE | Facility: CLINIC | Age: 68
End: 2023-11-28
Payer: COMMERCIAL

## 2023-11-28 NOTE — TELEPHONE ENCOUNTER
Patient calling to make appointment in regards to her stating she feels a bit of pressure in her abdomen as if she needs to push stool out. Patient states she currently has a colostomy bag and has had the same issue before. She says she can feel what feels like a lump in her rectum and that it smells, and feels like she needs a provider to check, as she has had it happen before.  Ensured that patient had no other symptoms. Writer assisted patient with making appointment to have checked and notified that if she feels any other symptoms to call the clinic back. Patient verbalized understanding and had no other questions or concerns.    Bill Rinaldi RN  [facility]

## 2023-11-28 NOTE — TELEPHONE ENCOUNTER
11/28 Called and left voicemail, provided phone number [phone] to reschedule jan 9th appointment with Dr. Sheehan to hold afternoon spots on 12/12.    Swathi grey Procedure   Orthopedics, Podiatry, Sports Medicine, Ent ,Eye , Audiology, Adult Endocrine & Diabetes, Nutrition & Medication Therapy Management Specialties   [facility]

## 2023-11-29 ENCOUNTER — TELEPHONE (OUTPATIENT)
Dept: FAMILY MEDICINE | Facility: CLINIC | Age: 68
End: 2023-11-29
Payer: COMMERCIAL

## 2023-11-29 DIAGNOSIS — Z79.4 TYPE 2 DIABETES MELLITUS WITH HYPERGLYCEMIA, WITH LONG-TERM CURRENT USE OF INSULIN (H): Primary | ICD-10-CM

## 2023-11-29 DIAGNOSIS — E11.65 TYPE 2 DIABETES MELLITUS WITH HYPERGLYCEMIA, WITH LONG-TERM CURRENT USE OF INSULIN (H): Primary | ICD-10-CM

## 2023-11-29 NOTE — TELEPHONE ENCOUNTER
Medication not active on current medication list. Routing to provider to review and advise.     Dominique Foley, REEMAN, RN   [facility] Primary Care [facility]

## 2023-11-29 NOTE — TELEPHONE ENCOUNTER
11/29 Patient has moved appointment to sooner date.     Swathi grey Procedure   Orthopedics, Podiatry, Sports Medicine, Ent ,Eye , Audiology, Adult Endocrine & Diabetes, Nutrition & Medication Therapy Management Specialties   [facility]

## 2023-11-30 ENCOUNTER — NURSE TRIAGE (OUTPATIENT)
Dept: FAMILY MEDICINE | Facility: CLINIC | Age: 68
End: 2023-11-30

## 2023-11-30 ENCOUNTER — PATIENT OUTREACH (OUTPATIENT)
Dept: GERIATRIC MEDICINE | Facility: CLINIC | Age: 68
End: 2023-11-30

## 2023-11-30 DIAGNOSIS — G89.29 CHRONIC BILATERAL LOW BACK PAIN WITHOUT SCIATICA: Primary | ICD-10-CM

## 2023-11-30 DIAGNOSIS — M54.50 CHRONIC BILATERAL LOW BACK PAIN WITHOUT SCIATICA: Primary | ICD-10-CM

## 2023-11-30 NOTE — TELEPHONE ENCOUNTER
"Called and spoke with patient. Pt reports when she wipes feels lumps. First noticed 1-2 weeks ago. States this has never happened before.  Pt with history of colostomy. States concerned \"implants are coming out\".     Denies any bleeding, fatigue, nausea, vomiting, fever, chills, abnormal ostomy concerns..   Affirms discomfort. When sitting down feels like needs to pass something. Pt reports she has an ostomy bag and does not pass stools.  Size of lump has stayed the same. The discomfort has gotten gradually worse.  States lump is slightly smaller than a golf ball. Discomfort is mostly when sitting, rates 6/10 on pain scale.   Pt states this morning  she felt lightheaded and like was going to pass out. States this has resolved now.   Pt states she has foul smell and yellow drainage when she wipes.     Reports blood sugars have been at goal       RN advise patient needs to be seen sooner than 12/5 virtual visit with Dr. King as concern for possible infection.   Scheduled pt for appointment on 12/1. Also changed MG lab appointment for 12/1 to BK.     Reviewed reasons for call back and ER precautions.     Pt verbalized understanding and agrees with plan.       Jenae Min RN    [facility]        "

## 2023-11-30 NOTE — TELEPHONE ENCOUNTER
Pt called to see if PCP can see her tomorrow. She was sched today with juanito farooq but was late for her appt so was told to reschedule. Is it ok to use SD tomorrow @ 1:30?

## 2023-11-30 NOTE — PROGRESS NOTES
[facility] Care Coordination Contact    Member called to report that she needs a cane. Reports that PT told her that she is ready to move from a walker to a cane. Care Coordinator will verify with PT what kind of cane (standard/ 4 prong) they are wanting to see member work with.     Malathi Bacon RN PHN  Care Coordinator  [facility]  [phone]

## 2023-12-01 ENCOUNTER — MYC REFILL (OUTPATIENT)
Dept: PEDIATRICS | Facility: CLINIC | Age: 68
End: 2023-12-01

## 2023-12-01 ENCOUNTER — MYC REFILL (OUTPATIENT)
Dept: FAMILY MEDICINE | Facility: CLINIC | Age: 68
End: 2023-12-01

## 2023-12-01 ENCOUNTER — OFFICE VISIT (OUTPATIENT)
Dept: FAMILY MEDICINE | Facility: CLINIC | Age: 68
End: 2023-12-01
Payer: COMMERCIAL

## 2023-12-01 ENCOUNTER — LAB (OUTPATIENT)
Dept: LAB | Facility: CLINIC | Age: 68
End: 2023-12-01
Payer: COMMERCIAL

## 2023-12-01 VITALS
HEIGHT: 62 IN | TEMPERATURE: 98 F | WEIGHT: 179.8 LBS | BODY MASS INDEX: 33.09 KG/M2 | OXYGEN SATURATION: 95 % | DIASTOLIC BLOOD PRESSURE: 64 MMHG | SYSTOLIC BLOOD PRESSURE: 124 MMHG | HEART RATE: 80 BPM

## 2023-12-01 DIAGNOSIS — F32.3 SEVERE MAJOR DEPRESSION WITH PSYCHOTIC FEATURES (H): ICD-10-CM

## 2023-12-01 DIAGNOSIS — K64.4 EXTERNAL HEMORRHOIDS: Primary | ICD-10-CM

## 2023-12-01 DIAGNOSIS — Z90.49 S/P PARTIAL COLECTOMY: ICD-10-CM

## 2023-12-01 DIAGNOSIS — K62.89 RECTAL DISCOMFORT: ICD-10-CM

## 2023-12-01 DIAGNOSIS — Z93.3 S/P COLOSTOMY (H): ICD-10-CM

## 2023-12-01 DIAGNOSIS — Z86.711 HISTORY OF PULMONARY EMBOLISM: ICD-10-CM

## 2023-12-01 DIAGNOSIS — G89.29 CHRONIC PAIN OF RIGHT KNEE: ICD-10-CM

## 2023-12-01 DIAGNOSIS — E11.40 DIABETIC NEUROPATHY (H): Primary | ICD-10-CM

## 2023-12-01 DIAGNOSIS — D50.8 IRON DEFICIENCY ANEMIA SECONDARY TO INADEQUATE DIETARY IRON INTAKE: ICD-10-CM

## 2023-12-01 DIAGNOSIS — M25.561 CHRONIC PAIN OF RIGHT KNEE: ICD-10-CM

## 2023-12-01 DIAGNOSIS — M17.11 PRIMARY OSTEOARTHRITIS OF RIGHT KNEE: ICD-10-CM

## 2023-12-01 LAB
ERYTHROCYTE [DISTWIDTH] IN BLOOD BY AUTOMATED COUNT: 15.6 % (ref 10–15)
HBA1C MFR BLD: 7.4 % (ref 0–5.6)
HCT VFR BLD AUTO: 35.1 % (ref 35–47)
HGB BLD-MCNC: 10.9 G/DL (ref 11.7–15.7)
MCH RBC QN AUTO: 27.2 PG (ref 26.5–33)
MCHC RBC AUTO-ENTMCNC: 31.1 G/DL (ref 31.5–36.5)
MCV RBC AUTO: 88 FL (ref 78–100)
PLATELET # BLD AUTO: 335 10E3/UL (ref 150–450)
RBC # BLD AUTO: 4.01 10E6/UL (ref 3.8–5.2)
RETICS # AUTO: 0.05 10E6/UL (ref 0.03–0.1)
RETICS/RBC NFR AUTO: 1.3 % (ref 0.5–2)
WBC # BLD AUTO: 7.1 10E3/UL (ref 4–11)

## 2023-12-01 PROCEDURE — 83036 HEMOGLOBIN GLYCOSYLATED A1C: CPT

## 2023-12-01 PROCEDURE — 83540 ASSAY OF IRON: CPT

## 2023-12-01 PROCEDURE — 83550 IRON BINDING TEST: CPT

## 2023-12-01 PROCEDURE — 99213 OFFICE O/P EST LOW 20 MIN: CPT | Performed by: PHYSICIAN ASSISTANT

## 2023-12-01 PROCEDURE — 85027 COMPLETE CBC AUTOMATED: CPT

## 2023-12-01 PROCEDURE — 85045 AUTOMATED RETICULOCYTE COUNT: CPT

## 2023-12-01 PROCEDURE — 36415 COLL VENOUS BLD VENIPUNCTURE: CPT

## 2023-12-01 PROCEDURE — 82728 ASSAY OF FERRITIN: CPT

## 2023-12-01 RX ORDER — ASPIRIN 81 MG/1
81 TABLET, CHEWABLE ORAL DAILY
Qty: 90 TABLET | Refills: 1 | Status: SHIPPED | OUTPATIENT
Start: 2023-12-01 | End: 2024-03-28

## 2023-12-01 RX ORDER — TIZANIDINE 2 MG/1
TABLET ORAL
Qty: 30 TABLET | Refills: 0 | Status: SHIPPED | OUTPATIENT
Start: 2023-12-01 | End: 2024-06-07

## 2023-12-01 RX ORDER — MULTIVITAMIN WITH FOLIC ACID 400 MCG
1 TABLET ORAL DAILY
Qty: 90 TABLET | Refills: 0 | Status: SHIPPED | OUTPATIENT
Start: 2023-12-01 | End: 2024-01-10

## 2023-12-01 ASSESSMENT — PAIN SCALES - GENERAL: PAINLEVEL: NO PAIN (0)

## 2023-12-01 NOTE — PROGRESS NOTES
"  Assessment & Plan     External hemorrhoids  Discussed with patient appearance on exam, no thrombosed area, but bright red blood. Question internal component that she was able to reduce previously but not palpable today. Discussed sitz baths and follow up if not improving over the next week, referral given.   - Adult Colorectal Surgery  Referral - [facility]); Future    Rectal discomfort  Small amount of retained mucus/exudate removed, discomfort with exam, still retained component palpable but question if this is causing patient's symptoms, suspect more hemorrhoid which was discussed.   - Adult Colorectal Surgery  Referral - [facility]); Future    S/P colostomy (H)  No issues with ostomy site or output.  - Adult Colorectal Surgery  Referral - [facility]); Future             BMI:   Estimated body mass index is 32.89 kg/m  as calculated from the following:    Height as of this encounter: 1.575 m (5' 2\").    Weight as of this encounter: 81.6 kg (179 lb 12.8 oz).           Daniella Addison PA-C  [facility]    Rebeka Barrera is a 68 year old, presenting for the following health issues:  No chief complaint on file.      12/1/2023    11:01 AM   Additional Questions   Roomed by Go Gerard       History of Present Illness       Reason for visit:  Have a lump in my rectum  Symptom onset:  3-7 days ago  Symptom intensity:  Severe  Symptom progression:  Worsening  Had these symptoms before:  Yes  Has tried/received treatment for these symptoms:  Yes  Previous treatment was successful:  Yes  Prior treatment description:  Dr had to remove layers of dead skin stuck in rectum  What makes it worse:  Sitting down is uncomfortable  What makes it better:  Laying down is not so bad    She eats 0-1 servings of fruits and vegetables daily.She consumes 0 sweetened beverage(s) daily.She " exercises with enough effort to increase her heart rate 9 or less minutes per day.  She exercises with enough effort to increase her heart rate 3 or less days per week.   She is taking medications regularly.           For 2 weeks or more she has noticed anal mass and rectal discomfort. Feels like she needs to have a BM. Sometimes brown liquid on her pad, no blood. Uncomfortable to site. Had been able to reduce mass. Has not felt mass today following recent reduction.    Colostomy many years ago in [place]. Possible nerve injury during hysterectomy per patient and had incontinence issues so colostomy placed. Once had to have skin/mucus removed.      Review of Systems         Objective    There were no vitals taken for this visit.  There is no height or weight on file to calculate BMI.  Physical Exam   GENERAL: healthy, alert and no distress  RECTAL (female): With bearing down, small soft external mass, Some BRB on digital exam and palpable mass, small amount of brown material removed. Discomfort with exam. No palpable internal hemorrhoid.

## 2023-12-01 NOTE — COMMUNITY RESOURCES LIST (ENGLISH)
12/01/2023   [facility] - Outpatient Clinics  N/A  For additional resource needs, please contact your health insurance member services or your primary care team.  Phone: [phone]   Email: N/A   Address: [facility] [place] [address]   Hours: N/A        Financial Stability       Utility payment assistance  1  [place] [facility] - Energy and Utilities Distance: 20.11 miles      In-Person, Phone/Virtual   [address] [place] [address]  Language: English  Hours: Mon - Fri 8:30 AM - 4:30 PM  Fees: Free   Phone: (607) 932-4498 Website: [place]://[place]/[employer]/energy/consumer-assistance/energy-assistance-program/     2  [place] [employer] [facility] - [place]'s Telephone Assistance Plan (TAP) and [facility] Lifeline and Affordable Connectivity Program (ACP) Distance: 22.87 miles      Phone/Virtual   [address] [place] [address]  Language: English  Fees: Free   Phone: (376) 369-3253 Email: luz marina.codi@[facility].mn. Website: https://[place].gov/puc/consumers/telephone/          Important Numbers & Websites       [facility]   211 211unitedway.org  Poison Control   (533) 792-2471 [place]poison.[place]  Suicide and Crisis Lifeline   [address] [place], [place] [address]line.org  Childhelp [place] Child Abuse Hotline   249.902.1821 Childhelphotline.org  National Sexual Assault Hotline   (357) 768-4738 (HOPE) Rainn.org  National Runaway Safeline   (387) 683-1406 (RUNAWAY) 1800runaway.org  Pregnancy & Postpartum Support [place]   Call/text 502-877-9069 Ppsupportmn.org  Substance Abuse National Helpline ([facility]   732-729-HELP (7737) Findtreatment.gov  Emergency Services   911

## 2023-12-01 NOTE — PROGRESS NOTES
[facility] Care Coordination Contact    Order sent to [facility] for a standard cane.     Malathi Bacon RN PHN  Care Coordinator  [facility]  [phone]

## 2023-12-01 NOTE — PATIENT INSTRUCTIONS
Soak in warm water bath several times per day. Follow up with Rectal Specialist if not improving over the next week.    If you develop increased severe pain, fever, feeling sick, go to the ER.

## 2023-12-01 NOTE — COMMUNITY RESOURCES LIST (ENGLISH)
12/01/2023   [facility] [employer]  N/A  For questions about this resource list or additional care needs, please contact your primary care clinic or care manager.  Phone: [phone]   Email: N/A   Address: [address] [place] [address]   Hours: N/A        Financial Stability       Utility payment assistance  1  [facility]  Office - [facility] Distance: 5.58 miles      Phone/Virtual   [address] [address]  Language: English  Hours: Mon - Fri 8:30 AM - 12:00 PM , Mon - Fri 1:00 PM - 4:00 PM  Fees: Free   Phone: (860) 953-7046 Email: martha@[facility].[employer] Website: https://www.[employer] Information Technologyusa.org/usn/     2  [facility] Community Action Program[employer] (ACCAP) - Energy Assistance Program Distance: 5.58 miles      In-Person, Phone/Virtual   [address] [address]  Language: English  Hours: Mon - Fri 8:00 AM - 4:30 PM  Fees: Free   Phone: (492) 787-6579 Email: accap@accap.org Website: http://www.accap.org          Important Numbers & Websites       Emergency Services   911  [facility] Services   311  Poison Control   (184) 166-3057  Suicide Prevention Lifeline   (157) 294-9047 (TALK)  Child Abuse Hotline   (500) 239-9828 (4-A-Child)  Sexual Assault Hotline   (296) 396-1882 (HOPE)  National Runaway Safeline   (285) 723-7418 (RUNAWAY)  All-Options Talkline   (431) 257-4955  Substance Abuse Referral   (801) 358-8652 (HELP)

## 2023-12-02 LAB
FERRITIN SERPL-MCNC: 208 NG/ML (ref 11–328)
IRON BINDING CAPACITY (ROCHE): 306 UG/DL (ref 240–430)
IRON SATN MFR SERPL: 16 % (ref 15–46)
IRON SERPL-MCNC: 48 UG/DL (ref 37–145)

## 2023-12-04 ENCOUNTER — TELEPHONE (OUTPATIENT)
Dept: FAMILY MEDICINE | Facility: CLINIC | Age: 68
End: 2023-12-04

## 2023-12-04 ENCOUNTER — THERAPY VISIT (OUTPATIENT)
Dept: PHYSICAL THERAPY | Facility: CLINIC | Age: 68
End: 2023-12-04
Payer: COMMERCIAL

## 2023-12-04 DIAGNOSIS — Z96.651 S/P TOTAL KNEE ARTHROPLASTY, RIGHT: Primary | ICD-10-CM

## 2023-12-04 PROCEDURE — 97110 THERAPEUTIC EXERCISES: CPT | Mod: GP

## 2023-12-04 NOTE — TELEPHONE ENCOUNTER
Forms/Letter Request    Type of form/letter: [employer], Durable Medical Equipment Order, Cane Order ([id number])    Have you been seen for this request: N/A    Do we have the form/letter: Yes: placed in providers forms basket for review    When is form/letter needed by: ASAP    How would you like the form/letter returned: Fax  [phone]

## 2023-12-06 DIAGNOSIS — Z79.4 TYPE 2 DIABETES MELLITUS WITH HYPERGLYCEMIA, WITH LONG-TERM CURRENT USE OF INSULIN (H): ICD-10-CM

## 2023-12-06 DIAGNOSIS — E11.65 TYPE 2 DIABETES MELLITUS WITH HYPERGLYCEMIA, WITH LONG-TERM CURRENT USE OF INSULIN (H): ICD-10-CM

## 2023-12-06 RX ORDER — ASPIRIN 81 MG/1
81 TABLET, CHEWABLE ORAL DAILY
Qty: 90 TABLET | Refills: 1 | OUTPATIENT
Start: 2023-12-06

## 2023-12-06 NOTE — TELEPHONE ENCOUNTER
Script sent to the pharmacy on 12/1/23 for 90 tablets with 1 additional refill on file. Patient should have refills on file with the pharmacy.    Ting Abdalla RN

## 2023-12-12 ENCOUNTER — OFFICE VISIT (OUTPATIENT)
Dept: ORTHOPEDICS | Facility: CLINIC | Age: 68
End: 2023-12-12
Payer: COMMERCIAL

## 2023-12-12 DIAGNOSIS — Z96.651 STATUS POST TOTAL KNEE REPLACEMENT, RIGHT: Primary | ICD-10-CM

## 2023-12-12 DIAGNOSIS — E88.819 INSULIN RESISTANCE: ICD-10-CM

## 2023-12-12 DIAGNOSIS — Z79.4 TYPE 2 DIABETES MELLITUS WITHOUT COMPLICATION, WITH LONG-TERM CURRENT USE OF INSULIN (H): Primary | ICD-10-CM

## 2023-12-12 DIAGNOSIS — E11.9 TYPE 2 DIABETES MELLITUS WITHOUT COMPLICATION, WITH LONG-TERM CURRENT USE OF INSULIN (H): Primary | ICD-10-CM

## 2023-12-12 PROCEDURE — 99024 POSTOP FOLLOW-UP VISIT: CPT | Performed by: ORTHOPAEDIC SURGERY

## 2023-12-12 ASSESSMENT — KOOS JR
BENDING TO THE FLOOR TO PICK UP OBJECT: MILD
KOOS JR SCORING: 76.33
RISING FROM SITTING: MODERATE
TWISING OR PIVOTING ON KNEE: MILD

## 2023-12-12 ASSESSMENT — PAIN SCALES - GENERAL: PAINLEVEL: MILD PAIN (2)

## 2023-12-12 NOTE — LETTER
12/12/2023         RE: Holly Muir  [address] [address] [facility] [address]        Dear Colleague,    Thank you for referring your patient, Holly Muir, to the [facility]. Please see a copy of my visit note below.    Chief Complaint: Surgical Followup (3mo s.p RTKA 10/2/23)         HPI: Holly Muir returns today in follow-up for her knee. She reports that she is doing well. SHe has reached goal for ROM, is walking well-- using a cane for balance but not for her knee, and is feel better over time. Happy with how she is doing.   Medications and allergies are documented in the EMR and have been reviewed.      Current Outpatient Medications:      acetaminophen (TYLENOL) 325 MG tablet, Take 2 tablets (650 mg) by mouth every 4 hours as needed for other (For optimal non-opioid multimodal pain management to improve pain control.), Disp: 100 tablet, Rfl: 0     ARIPiprazole (ABILIFY) 20 MG tablet, Take 1 tablet (20 mg) by mouth every evening, Disp: 30 tablet, Rfl: 3     aspirin (ASA) 81 MG chewable tablet, Take 1 tablet (81 mg) by mouth daily, Disp: 90 tablet, Rfl: 1     blood glucose (NO BRAND SPECIFIED) test strip, Use to test blood sugar 4 times daily or as directed. accu check guide strips, Disp: 360 strip, Rfl: 3     blood glucose monitoring (NO BRAND SPECIFIED) meter device kit, Use to test blood sugar 4  times daily or as directed. Accuceck Guide meter, Disp: 1 kit, Rfl: 1     blood glucose monitoring (ONE TOUCH ULTRA 2) meter device kit, USE TO TEST BLOOD SUGAR 4 TIMES DAILY OR AS DIRECTED. ACCUCECK GUIDE METER, Disp: , Rfl:      blood glucose monitoring (ULTRA THIN 30G) lancets, Use to test blood sugar 4  times daily or as directed.lancets for acchu check guide, Disp: 400 each, Rfl: 3     buPROPion (WELLBUTRIN SR) 200 MG 12 hr tablet, Take 1 tablet (200 mg) by mouth daily (with breakfast), Disp: 30 tablet, Rfl: 3     busPIRone HCl (BUSPAR) 30 MG tablet, TAKE 1  TABLET BY MOUTH TWICE A DAY, Disp: 60 tablet, Rfl: 3     cetirizine (ZYRTEC) 10 MG tablet, TAKE 1 TABLET (10 MG) BY MOUTH DAILY., Disp: 90 tablet, Rfl: 1     Continuous Blood Gluc  (FREESTYLE CM 2 READER) GALINA, 1 Dose continuous, Disp: 1 each, Rfl: 0     Continuous Blood Gluc Sensor (FREESTYLE CM 2 SENSOR) [facility], Use one sensor every 14 days to read blood sugar, Disp: 9 each, Rfl: 3     diclofenac (VOLTAREN) 1 % topical gel, Apply 4 g topically 4 times daily, Disp: 350 g, Rfl: 3     ELIQUIS ANTICOAGULANT 5 MG tablet, TAKE 1 TABLET BY MOUTH TWICE A DAY, Disp: 60 tablet, Rfl: 2     ferrous gluconate (FERGON) 324 (38 Fe) MG tablet, Take 1 tablet (324 mg) by mouth daily (with breakfast), Disp: 90 tablet, Rfl: 3     gabapentin (NEURONTIN) 600 MG tablet, TAKE 2 TABLETS (1,200 MG) BY MOUTH 3 TIMES DAILY FOR 30 DAYS, Disp: 180 tablet, Rfl: 5     glimepiride (AMARYL) 2 MG tablet, Take 2 tablets (4 mg) by mouth 2 times daily (before meals), Disp: 360 tablet, Rfl: 3     insulin aspart (NOVOLOG FLEXPEN) 100 UNIT/ML pen, Using as needed for high blood sugars, Disp: , Rfl:      insulin glargine (LANTUS SOLOSTAR) 100 UNIT/ML pen, Inject 35-42 units at bedtime as directed + 2 units for priming of pen. Max dose 44 units., Disp: 45 mL, Rfl: 0     insulin pen needle (31G X 5 MM) 31G X 5 MM miscellaneous, Use four times 4 day times a day, Disp: 200 each, Rfl: 3     metFORMIN (GLUCOPHAGE XR) 500 MG 24 hr tablet, Take 2 tablets (1,000 mg) by mouth 2 times daily (with meals) Take two tablets with breakfast and one tablet with dinner for one week then increase to two tablets with breakfast and two tablets with dinner thereafter, Disp: 360 tablet, Rfl: 3     mirtazapine (REMERON) 7.5 MG tablet, Take 1 tablet (7.5 mg) by mouth at bedtime, Disp: 30 tablet, Rfl: 3     Multiple Vitamin (DAILY-ISAAC MULTIVITAMIN) TABS, TAKE 1 TABLET BY MOUTH EVERY DAY, Disp: 90 tablet, Rfl: 0     naloxone (NARCAN) 4 MG/0.1ML nasal spray, Spray 1  spray (4 mg) into one nostril alternating nostrils once as needed for opioid reversal every 2-3 minutes until assistance arrives, Disp: 0.2 mL, Rfl: 0     nystatin (MYCOSTATIN) 472635 UNIT/GM external powder, Apply 1 dose topically 3 times daily as needed, Disp: 60 g, Rfl: 3     omeprazole (PRILOSEC) 20 MG DR capsule, TAKE 2 CAPSULES (40 MG) BY MOUTH DAILY *TAKE 30-60 MINUTES BEFORE A MEAL*, Disp: 180 capsule, Rfl: 2     PARoxetine (PAXIL) 40 MG tablet, Take 1 tablet (40 mg) by mouth every evening, Disp: 30 tablet, Rfl: 3     pioglitazone (ACTOS) 45 MG tablet, Take 1 tablet (45 mg) by mouth daily, Disp: 90 tablet, Rfl: 1     polyethylene glycol (MIRALAX) 17 g packet, Take 17 g by mouth daily, Disp: 7 packet, Rfl: 0     propranolol ER (INDERAL LA) 80 MG 24 hr capsule, TAKE 1 CAPSULE BY MOUTH EVERY DAY, Disp: 90 capsule, Rfl: 3     ramipril (ALTACE) 5 MG capsule, TAKE 1 CAPSULE BY MOUTH EVERY DAY, Disp: 90 capsule, Rfl: 2     rOPINIRole (REQUIP) 0.25 MG tablet, TAKE 1 TABLET (0.25 MG) BY MOUTH EVERY EVENING, Disp: 90 tablet, Rfl: 0     semaglutide (OZEMPIC) 2 MG/3ML pen, Inject 0.25 mg Subcutaneous every 7 days, Disp: 3 mL, Rfl: 0     simvastatin (ZOCOR) 20 MG tablet, TAKE 1 TABLET BY MOUTH EVERYDAY AT BEDTIME, Disp: 90 tablet, Rfl: 2     tiZANidine (ZANAFLEX) 2 MG tablet, TAKE 1 TABLET BY MOUTH THREE TIMES A DAY AS NEEDED FOR MUSCLE SPASM, Disp: 30 tablet, Rfl: 0     vitamin C (CVS VITAMIN C) 500 MG tablet, 1/2 TABLET BY MOUTH JACOBSON WITH IRON SUPPLEMENT BEFORE MEAL, Disp: 45 tablet, Rfl: 3     VITAMIN D3 25 MCG (1000 UT) tablet, TAKE 1 TABLET BY MOUTH EVERY DAY, Disp: 90 tablet, Rfl: 3    Current Facility-Administered Medications:      triamcinolone (KENALOG-40) injection 40 mg, 40 mg, , , Santy Harrington, , 40 mg at 01/08/24 1612    Allergies: Morphine sulfate    Current Status:  JOSE LUIS Jr: 76.33  UCLA:  Global Mental Health Score - (P) 15  Global Physical Health Score - (P) 12  PROMIS TOTAL - SUBSCORES - (P)  27    Physical Exam:  On physical examination the patient appears the stated age, is in no acute distress, affect is appropriate, and breathing is non-labored.    There were no vitals taken for this visit.  There is no height or weight on file to calculate BMI.    Rises from chair:easily   Gait: rapidly with a cane, good heel strike   Appearance: benign  Clinical alignment:neutral   Effusion:no  Tenderness to palpation: min  Extension:0  Flexion: 120  Patellar tracking:normal   Collateral ligaments: intact  Stable in in the sagittal plane in mid-flexion.    Assessment: doing well. Discussed activities on total knee and discussed typcial follow-up. All the patient's questions were answered to the best of my ability.     Plan: RTC in 12 months, sooner if issues.   No ref. provider found    Again, thank you for allowing me to participate in the care of your patient.        Sincerely,        Sanford Sheehan MD

## 2023-12-12 NOTE — NURSING NOTE
"Holly Muir's chief complaint for this visit includes:  Chief Complaint   Patient presents with    Surgical Followup     3mo s.p RTKA 10/2/23       Referring Provider:  No referring provider defined for this encounter.    There were no vitals taken for this visit.  Mild Pain (2)   Global Mental Health Score: (P) 15  Global Physical Health Score: (P) 12  PROMIS TOTAL - SUBSCORES: (P) 27  UCLA: 2-3 (limited due to back pain, not knee)  KOOS Jr.  76.33     Pain increases with: Some minor pains but mostly pain free now  Previous surgeries: RTKA 10/2/23  Previous injections within last 6 months: NO  Treatments done: PT- going well  Pain: 1/10  Concerns: Knee feels slightly \"loose\" sometimes, otherwise no issue.     Martinez Patel, ATC            "

## 2023-12-13 NOTE — TELEPHONE ENCOUNTER
DIAGNOSIS: left knee pain    APPOINTMENT DATE: 01/08/2024   NOTES STATUS DETAILS   OFFICE NOTE from referring provider Internal 12/12/2023 Dr Sheehan [facility]    OFFICE NOTE from other specialist Internal 12/04/2023 PT [facility]    DISCHARGE SUMMARY from hospital N/A    DISCHARGE REPORT from the ER N/A    OPERATIVE REPORT Internal 10/02/2023 ARTHROPLASTY, RIGHT KNEE, TOTAL    EMG report N/A    MEDICATION LIST N/A    MRI N/A    DEXA (osteoporosis/bone health) N/A    CT SCAN N/A    XRAYS (IMAGES & REPORTS) N/A

## 2023-12-15 RX ORDER — PIOGLITAZONEHYDROCHLORIDE 45 MG/1
45 TABLET ORAL DAILY
Qty: 90 TABLET | Refills: 1 | Status: SHIPPED | OUTPATIENT
Start: 2023-12-15 | End: 2024-01-31

## 2023-12-22 NOTE — PROGRESS NOTES
Benigno delivered per APA. CC notified.    Liz Hendricks  Care Management Specialist   [facility]   [phone]

## 2023-12-26 ENCOUNTER — PATIENT OUTREACH (OUTPATIENT)
Dept: CARE COORDINATION | Facility: CLINIC | Age: 68
End: 2023-12-26
Payer: COMMERCIAL

## 2024-01-02 NOTE — PROGRESS NOTES
Virtual Visit Details    Type of service:  Video Visit     Originating Location (pt. Location): Home    Distant Location (provider location):  Off-site  Platform used for Video Visit: Faisal      Outcome for 01/02/24 4:12 PM: Data uploaded on Mathieu Honeycutt LPN

## 2024-01-05 ENCOUNTER — PATIENT OUTREACH (OUTPATIENT)
Dept: GERIATRIC MEDICINE | Facility: CLINIC | Age: 69
End: 2024-01-05
Payer: COMMERCIAL

## 2024-01-05 NOTE — PROGRESS NOTES
[facility] Care Coordination Contact    Called member to schedule annual HRA home visit. HRA has been scheduled for 1/24/23 12:00pm.    Malathi Bacon RN PHN  Care Coordinator  [facility]  O: [phone]  C:[phone]  F:[phone]

## 2024-01-08 ENCOUNTER — PRE VISIT (OUTPATIENT)
Dept: ORTHOPEDICS | Facility: CLINIC | Age: 69
End: 2024-01-08

## 2024-01-08 ENCOUNTER — OFFICE VISIT (OUTPATIENT)
Dept: ORTHOPEDICS | Facility: CLINIC | Age: 69
End: 2024-01-08
Payer: COMMERCIAL

## 2024-01-08 ENCOUNTER — THERAPY VISIT (OUTPATIENT)
Dept: PHYSICAL THERAPY | Facility: CLINIC | Age: 69
End: 2024-01-08
Payer: COMMERCIAL

## 2024-01-08 DIAGNOSIS — M17.12 PRIMARY OSTEOARTHRITIS OF LEFT KNEE: Primary | ICD-10-CM

## 2024-01-08 DIAGNOSIS — Z96.651 S/P TOTAL KNEE ARTHROPLASTY, RIGHT: Primary | ICD-10-CM

## 2024-01-08 DIAGNOSIS — M54.16 LUMBAR RADICULOPATHY: ICD-10-CM

## 2024-01-08 PROCEDURE — 97110 THERAPEUTIC EXERCISES: CPT | Mod: GP

## 2024-01-08 PROCEDURE — 99213 OFFICE O/P EST LOW 20 MIN: CPT | Mod: 25 | Performed by: FAMILY MEDICINE

## 2024-01-08 PROCEDURE — 97112 NEUROMUSCULAR REEDUCATION: CPT | Mod: GP

## 2024-01-08 PROCEDURE — 20610 DRAIN/INJ JOINT/BURSA W/O US: CPT | Mod: LT | Performed by: FAMILY MEDICINE

## 2024-01-08 RX ORDER — TRIAMCINOLONE ACETONIDE 40 MG/ML
40 INJECTION, SUSPENSION INTRA-ARTICULAR; INTRAMUSCULAR
Status: SHIPPED | OUTPATIENT
Start: 2024-01-08

## 2024-01-08 RX ADMIN — TRIAMCINOLONE ACETONIDE 40 MG: 40 INJECTION, SUSPENSION INTRA-ARTICULAR; INTRAMUSCULAR at 16:12

## 2024-01-08 NOTE — LETTER
1/8/2024         RE: Holly Muir  [address] [address] [facility] [address]        Dear Colleague,    Thank you for referring your patient, Holly Muir, to the [facility] [place]. Please see a copy of my visit note below.    HISTORY OF PRESENT ILLNESS  Ms. Muir is a pleasant 68 year old female following up with left knee pain.  Holly has been noticing worsening left knee pain lately.  I have seen her in the past for her knees, chiefly her right knee for which she underwent a arthroplasty in the past year.  Since then her left knee has been bothering her quite a bit more, this is worse with rehab based activities for her right knee and with generalized use.     PHYSICAL EXAM  General  - normal appearance, in no obvious distress  Musculoskeletal - left knee  - stance: mildly antalgic gait  - inspection: trace effusion  - palpation: medial joint line tenderness  - ROM: 120 degrees flexion, 0 degrees extension, painful active ROM  - strength: 5/5 in flexion, 5/5 in extension  - special tests:  (-) Mahin  (-) varus at 0 and 30 degrees flexion  (-) valgus at 0 and 30 degrees flexion  Neuro  - no sensory or motor deficit, grossly normal coordination, normal muscle tone       ASSESSMENT & PLAN  Ms. Muir is a 68 year old female following up with left knee pain.    I reviewed her previous left knee x-ray in the room with her, this does reveal moderate osteoarthritis.    She is very familiar with the treatment algorithm for osteoarthritis with regards to her situation, given her treatment for her right knee.  Through shared decision making we did decide to inject her left knee today with corticosteroid.    If this is not helping whatsoever in the coming weeks we should follow-up.    It was a pleasure seeing Holly.        Santy Harrington DO, CAQSM        Large Joint Injection/Arthocentesis: L knee joint    Date/Time: 1/8/2024 4:12 PM    Performed by: Santy Harrington,    Authorized by: Santy Harrington DO    Indications:  Pain  Needle Size:  22 G  Guidance: landmark guided    Location:  Knee      Medications:  40 mg triamcinolone 40 MG/ML  Outcome:  Tolerated well, no immediate complications  Procedure discussed: discussed risks, benefits, and alternatives    Consent Given by:  Patient  Timeout: timeout called immediately prior to procedure    Prep: patient was prepped and draped in usual sterile fashion       PROCEDURE    Knee Injection - Intraarticular  The patient was informed of the risks and the benefits of the procedure and a written consent was signed.  The patient's left knee was prepped with chlorhexidine in sterile fashion.   40 mg of triamcinolone suspension was drawn up into a 5 mL syringe with 4 mL of 1% lidocaine.  Injection was performed using substerile technique.  A 1.5-inch 22-gauge needle was used to enter the lateral aspect of the knee.  Injection performed successfully without difficulty.  There were no complications. The patient tolerated the procedure well. There was negligible bleeding.                  This note was constructed using Dragon dictation software, please excuse any minor errors in spelling, grammar, or syntax.      Again, thank you for allowing me to participate in the care of your patient.        Sincerely,        Santy Harrington DO

## 2024-01-08 NOTE — NURSING NOTE
[facility]   ORTHOPEDICS & SPORTS MEDICINE  [address] [place] [address]  Dept: [phone]  ______________________________________________________________________________    Patient: Holly Muir   : 1955   MRN: [id number]   2024    INVASIVE PROCEDURE SAFETY CHECKLIST    Date: 2024   Procedure:Left knee joint injection  Patient Name: Holly Muir  MRN: [id number]  YOB: 1955    Action: Complete sections as appropriate. Any discrepancy results in a HARD COPY until resolved.     PRE PROCEDURE:  Patient ID verified with 2 identifiers (name and  or MRN): Yes  Procedure and site verified with patient/designee (when able): Yes  Accurate consent documentation in medical record: Yes  H&P (or appropriate assessment) documented in medical record: NA  H&P must be up to 20 days prior to procedure and updates within 24 hours of procedure as applicable: NA  Relevant diagnostic and radiology test results appropriately labeled and displayed as applicable: Yes  Procedure site(s) marked with provider initials: Yes    TIMEOUT:  Time-Out performed immediately prior to starting procedure, including verbal and active participation of all team members addressing the following:Yes  * Correct patient identify  * Confirmed that the correct side and site are marked  * An accurate procedure consent form  * Agreement on the procedure to be done  * Correct patient position  * Relevant images and results are properly labeled and appropriately displayed  * The need to administer antibiotics or fluids for irrigation purposes during the procedure as applicable   * Safety precautions based on patient history or medication use    DURING PROCEDURE: Verification of correct person, site, and procedures any time the responsibility for care of the patient is transferred to another member of the care team.       Prior to injection, verified patient identity using patient's name and date  of birth.  Due to injection administration, patient instructed to remain in clinic for 15 minutes  afterwards, and to report any adverse reaction to me immediately.    Joint injection was performed.      Drug Amount Wasted:  None.  Vial/Syringe: Single dose vial  Expiration Date:  06/01/2025      Kenyetta Garcia, ATC  January 8, 2024

## 2024-01-08 NOTE — PROGRESS NOTES
HISTORY OF PRESENT ILLNESS  Ms. Muir is a pleasant 68 year old female following up with left knee pain.  Holly has been noticing worsening left knee pain lately.  I have seen her in the past for her knees, chiefly her right knee for which she underwent a arthroplasty in the past year.  Since then her left knee has been bothering her quite a bit more, this is worse with rehab based activities for her right knee and with generalized use.     PHYSICAL EXAM  General  - normal appearance, in no obvious distress  Musculoskeletal - left knee  - stance: mildly antalgic gait  - inspection: trace effusion  - palpation: medial joint line tenderness  - ROM: 120 degrees flexion, 0 degrees extension, painful active ROM  - strength: 5/5 in flexion, 5/5 in extension  - special tests:  (-) Mahin  (-) varus at 0 and 30 degrees flexion  (-) valgus at 0 and 30 degrees flexion  Neuro  - no sensory or motor deficit, grossly normal coordination, normal muscle tone       ASSESSMENT & PLAN  Ms. Muir is a 68 year old female following up with left knee pain.    I reviewed her previous left knee x-ray in the room with her, this does reveal moderate osteoarthritis.    She is very familiar with the treatment algorithm for osteoarthritis with regards to her situation, given her treatment for her right knee.  Through shared decision making we did decide to inject her left knee today with corticosteroid.    If this is not helping whatsoever in the coming weeks we should follow-up.    It was a pleasure seeing Holly.        Santy Harrington DO, CAQSM        Large Joint Injection/Arthocentesis: L knee joint    Date/Time: 1/8/2024 4:12 PM    Performed by: Santy Harrington DO  Authorized by: Santy Harrington DO    Indications:  Pain  Needle Size:  22 G  Guidance: landmark guided    Location:  Knee      Medications:  40 mg triamcinolone 40 MG/ML  Outcome:  Tolerated well, no immediate complications  Procedure discussed: discussed risks, benefits,  and alternatives    Consent Given by:  Patient  Timeout: timeout called immediately prior to procedure    Prep: patient was prepped and draped in usual sterile fashion       PROCEDURE    Knee Injection - Intraarticular  The patient was informed of the risks and the benefits of the procedure and a written consent was signed.  The patient's left knee was prepped with chlorhexidine in sterile fashion.   40 mg of triamcinolone suspension was drawn up into a 5 mL syringe with 4 mL of 1% lidocaine.  Injection was performed using substerile technique.  A 1.5-inch 22-gauge needle was used to enter the lateral aspect of the knee.  Injection performed successfully without difficulty.  There were no complications. The patient tolerated the procedure well. There was negligible bleeding.                  This note was constructed using Dragon dictation software, please excuse any minor errors in spelling, grammar, or syntax.

## 2024-01-10 ENCOUNTER — VIRTUAL VISIT (OUTPATIENT)
Dept: ENDOCRINOLOGY | Facility: CLINIC | Age: 69
End: 2024-01-10
Payer: COMMERCIAL

## 2024-01-10 ENCOUNTER — MYC MEDICAL ADVICE (OUTPATIENT)
Dept: PHARMACY | Facility: CLINIC | Age: 69
End: 2024-01-10

## 2024-01-10 ENCOUNTER — TELEPHONE (OUTPATIENT)
Dept: ENDOCRINOLOGY | Facility: CLINIC | Age: 69
End: 2024-01-10

## 2024-01-10 VITALS — HEIGHT: 62 IN | WEIGHT: 183 LBS | BODY MASS INDEX: 33.68 KG/M2

## 2024-01-10 DIAGNOSIS — E11.9 TYPE 2 DIABETES MELLITUS WITHOUT COMPLICATION, WITH LONG-TERM CURRENT USE OF INSULIN (H): Primary | ICD-10-CM

## 2024-01-10 DIAGNOSIS — Z79.4 TYPE 2 DIABETES MELLITUS WITHOUT COMPLICATION, WITH LONG-TERM CURRENT USE OF INSULIN (H): Primary | ICD-10-CM

## 2024-01-10 PROCEDURE — 99213 OFFICE O/P EST LOW 20 MIN: CPT | Mod: 95 | Performed by: PHYSICIAN ASSISTANT

## 2024-01-10 ASSESSMENT — PAIN SCALES - GENERAL: PAINLEVEL: MODERATE PAIN (4)

## 2024-01-10 ASSESSMENT — PATIENT HEALTH QUESTIONNAIRE - PHQ9: SUM OF ALL RESPONSES TO PHQ QUESTIONS 1-9: 14

## 2024-01-10 NOTE — TELEPHONE ENCOUNTER
PA Initiation    Medication: OZEMPIC (0.25 OR 0.5 MG/DOSE) 2 MG/3ML SC SOPN  Insurance Company: [facility] - Phone [phone] Fax [phone]  Pharmacy Filling the Rx: [id number] - [place], [place] - [address]  Filling Pharmacy Phone: [phone]  Filling Pharmacy Fax: [phone]  Start Date: 1/10/2024     Key: [id number]

## 2024-01-10 NOTE — LETTER
1/10/2024         RE: Holly Muir  [address] [id number]        Dear Colleague,    Thank you for referring your patient, Holly uMir, to the [facility]. Please see a copy of my visit note below.    Virtual Visit Details    Type of service:  Video Visit     Originating Location (pt. Location): Home    Distant Location (provider location):  Off-site  Platform used for Video Visit: [employer]      Outcome for 01/02/24 4:12 PM: Data uploaded on Mathieu Honeycutt LPN         Assessment/Plan :   1.Type 2 DM with long term use of insulin. Holly is doing great. She continues to monitor her blood sugars closely and she has been able to decrease her Lantus. Her daughter is currently taking Ozempic once weekly and Holly would like to give it a try. We discussed the possible adverse effects. We also discussed that it should lead to an even further decrease in daily insulin needs. I will send in a new prescription for Ozempic 0.25 mg weekly. She is to decrease the Lantus to 25 unit(s) when she starts the Ozempic. She is also overdue for an MTM appt. I encouraged her to contact Liz Calero RPH to schedule a follow-up. I will also send a message to Liz Calero RPH regarding the titration of Ozempic. If Hloly has any problems, she is to contact our office. We will follow-up in 3 mos.    Due to the COVID 19 pandemic this visit was a telephone/video visit in order to help prevent spread of infection in this patient and the general population. The patient gave verbal consent for the visit today. I have independently reviewed and interpreted labs, imaging as indicated.       Distant Location (provider location):  Off-site  Mode of Communication:  Video Conference via ugichem  Chart review/prep time 5    Joined the call at 1/10/2024, 9:59:16 am.  Left the call at 1/10/2024, 10:09:12 am.  You were on the call for 9 minutes 55 seconds .  18 minutes spent on  the date of the encounter doing chart review, history and exam, documentation and further activities as noted above.      Chief complaint:  Holly is a 68 year old female who returns for follow-up of Type 2 DM.    I have reviewed Care Everywhere including [facility], [facility], [facility], [facility], [facility], [facility] , [facility], [place] lab reports, imaging reports and provider notes as indicated.      HISTORY OF PRESENT ILLNESS  Overall, Holly is doing well. She remains stable on her current medications. She takes pioglitazone 45 mg daily, metformin 2000 mg daily, glimepiride 4 mg twice daily and Lantus 30-35 unit(s) every day. She monitors her blood sugars with the FreeStyle Mathieu sensor. She states that her blood sugars have been up and down but okay. She is interested in starting a once weekly injection. Her daughter takes Ozempic and is doing really well.    Holly has had diabetes for many years. She has also used insulin therapy for many years. Recently, she has been working hard to make adjustments to her diet and to keep closer track of her blood sugars, in order to get her hemoglobin A1C under better control. Over the last year, her hemoglobin A1C has dropped from 9.1% to 7.4%.    Holly denies any recent problems with severe hyperglycemia and/or hypoglycemia. She does feel like her numbers have been fluctuating more, recently. She has been snacking on sugary foods more often. She also reports a few overnight low blood sugars. She has not had any problems with worsening vision or an increase in numbness/tingling in her feet. She also denies any issues with medication adverse effects.    Endocrine relevant labs are as follows:   Latest Reference Range & Units 12/01/23 13:01   Hemoglobin A1C 0.0 - 5.6 % 7.4 (H)   (H): Data is abnormally high   Latest Reference Range & Units 09/15/23 12:37   Albumin Urine mg/g Cr 0.00 - 25.00 mg/g Cr 64.26 (H)   (H): Data is abnormally high   Latest Reference  Range & Units 09/15/23 12:37   Albumin Urine mg/L mg/L 36.5     REVIEW OF SYSTEMS    Endocrine: positive for diabetes  Skin: negative  Eyes: negative for, visual blurring, redness, tearing  Ears/Nose/Throat: negative  Respiratory: No shortness of breath, dyspnea on exertion, cough, or hemoptysis  Cardiovascular: negative for, chest pain, dyspnea on exertion, lower extremity edema, and exercise intolerance  Gastrointestinal: negative for, nausea, vomiting, constipation, and diarrhea  Genitourinary: negative for, nocturia, dysuria, frequency, and urgency  Musculoskeletal: negative for, muscular weakness, nocturnal cramping, and foot pain  Neurologic: negative for, local weakness, numbness or tingling of hands, and numbness or tingling of feet  Psychiatric: negative  Hematologic/Lymphatic/Immunologic: negative    Past Medical History  Past Medical History:   Diagnosis Date     Bilateral knee pain      Cataracts, both eyes 5/8/2013     Cervical cancer (H)      Chronic kidney disease, stage 3a (H) 11/17/2021     Diabetes      Diabetic retinopathy (H)      HTN      Inflammatory arthritis      Major depression      Memory loss      Nephropathy      OA (osteoarthritis) of knee 9/11/2013     Other and unspecified hyperlipidemia      Pterygium eye      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 accu check guide strips 360 strip 3     blood glucose monitoring (NO  BRAND SPECIFIED) meter device kit Use to test blood sugar 4  times daily or as directed. Accuceck Guide meter 1 kit 1     blood glucose monitoring (ONE TOUCH ULTRA 2) meter device kit USE TO TEST BLOOD SUGAR 4 TIMES DAILY OR AS DIRECTED. ACCUCECK GUIDE METER       blood glucose monitoring (ULTRA THIN 30G) lancets Use to test blood sugar 4  times daily or as directed.lancets for acchu check guide 400 each 3     buPROPion (WELLBUTRIN SR) 200 MG 12 hr tablet Take 1 tablet (200 mg) by mouth daily (with breakfast) 30 tablet 3     busPIRone HCl (BUSPAR) 30 MG tablet TAKE 1 TABLET BY MOUTH TWICE A DAY 60 tablet 3     cetirizine (ZYRTEC) 10 MG tablet TAKE 1 TABLET (10 MG) BY MOUTH DAILY. 90 tablet 1     Continuous Blood Gluc  (FREESTYLE CM 2 READER) GALINA 1 Dose continuous 1 each 0     Continuous Blood Gluc Sensor (FREESTYLE CM 2 SENSOR) MISC Use one sensor every 14 days to read blood sugar 9 each 3     diclofenac (VOLTAREN) 1 % topical gel Apply 4 g topically 4 times daily 350 g 3     ELIQUIS ANTICOAGULANT 5 MG tablet TAKE 1 TABLET BY MOUTH TWICE A DAY 60 tablet 2     ferrous gluconate (FERGON) 324 (38 Fe) MG tablet Take 1 tablet (324 mg) by mouth daily (with breakfast) 90 tablet 3     gabapentin (NEURONTIN) 600 MG tablet TAKE 2 TABLETS (1,200 MG) BY MOUTH 3 TIMES DAILY FOR 30 DAYS 180 tablet 5     glimepiride (AMARYL) 2 MG tablet Take 2 tablets (4 mg) by mouth 2 times daily (before meals) 360 tablet 3     insulin aspart (NOVOLOG FLEXPEN) 100 UNIT/ML pen Using as needed for high blood sugars       insulin glargine (LANTUS SOLOSTAR) 100 UNIT/ML pen Inject 35-42 units at bedtime as directed + 2 units for priming of pen. Max dose 44 units. 45 mL 0     insulin pen needle (31G X 5 MM) 31G X 5 MM miscellaneous Use four times 4 day times a day 200 each 3     metFORMIN (GLUCOPHAGE XR) 500 MG 24 hr tablet Take 2 tablets (1,000 mg) by mouth 2 times daily (with meals) Take two tablets with breakfast and one tablet with  dinner for one week then increase to two tablets with breakfast and two tablets with dinner thereafter 360 tablet 3     mirtazapine (REMERON) 7.5 MG tablet Take 1 tablet (7.5 mg) by mouth at bedtime 30 tablet 3     Multiple Vitamin (DAILY-ISAAC MULTIVITAMIN) TABS Take 1 tablet by mouth daily 90 tablet 0     naloxone (NARCAN) 4 MG/0.1ML nasal spray Spray 1 spray (4 mg) into one nostril alternating nostrils once as needed for opioid reversal every 2-3 minutes until assistance arrives 0.2 mL 0     nystatin (MYCOSTATIN) 955156 UNIT/GM external powder Apply 1 dose topically 3 times daily as needed 60 g 3     omeprazole (PRILOSEC) 20 MG DR capsule TAKE 2 CAPSULES (40 MG) BY MOUTH DAILY *TAKE 30-60 MINUTES BEFORE A MEAL* 180 capsule 2     PARoxetine (PAXIL) 40 MG tablet Take 1 tablet (40 mg) by mouth every evening 30 tablet 3     pioglitazone (ACTOS) 45 MG tablet Take 1 tablet (45 mg) by mouth daily 90 tablet 1     polyethylene glycol (MIRALAX) 17 g packet Take 17 g by mouth daily 7 packet 0     propranolol ER (INDERAL LA) 80 MG 24 hr capsule TAKE 1 CAPSULE BY MOUTH EVERY DAY 90 capsule 3     ramipril (ALTACE) 5 MG capsule TAKE 1 CAPSULE BY MOUTH EVERY DAY 90 capsule 2     rOPINIRole (REQUIP) 0.25 MG tablet Take 1 tablet (0.25 mg) by mouth every evening 90 tablet 0     simvastatin (ZOCOR) 20 MG tablet TAKE 1 TABLET BY MOUTH EVERYDAY AT BEDTIME 90 tablet 2     tiZANidine (ZANAFLEX) 2 MG tablet TAKE 1 TABLET BY MOUTH THREE TIMES A DAY AS NEEDED FOR MUSCLE SPASM 30 tablet 0     traZODone (DESYREL) 50 MG tablet Take 1 tablet (50 mg) by mouth at bedtime (Patient not taking: Reported on 1/10/2024) 30 tablet 3     vitamin C ([facility] VITAMIN C) 500 MG tablet 1/2 TABLET BY MOUTH JACOBSON WITH IRON SUPPLEMENT BEFORE MEAL 45 tablet 3     VITAMIN D3 25 MCG (1000 UT) tablet TAKE 1 TABLET BY MOUTH EVERY DAY 90 tablet 3       Allergies  Allergies   Allergen Reactions     Morphine Sulfate Itching       Family History  family history includes  Cancer in her maternal grandfather and paternal grandfather; Cerebrovascular Disease in her mother; Diabetes in her brother, father, mother, and sister; Hypertension in her father; Multiple Sclerosis in her daughter; Thyroid Disease in her daughter and sister.    Social History  Social History     Tobacco Use     Smoking status: Former     Packs/day: 0     Types: Cigarettes     Start date: 1974     Quit date: 1975     Years since quittin.4     Passive exposure: Never     Smokeless tobacco: Never   Vaping Use     Vaping Use: Never used   Substance Use Topics     Alcohol use: Yes     Alcohol/week: 0.0 standard drinks of alcohol     Comment: social occasions     Drug use: No       Physical Exam  Body mass index is 33.47 kg/m .  GENERAL: no distress  SKIN: Visible skin clear. No significant rash, abnormal pigmentation or lesions.  EYES: Eyes grossly normal to inspection.  No discharge or erythema, or obvious scleral/conjunctival abnormalities.  NECK: visible goiter is not present; no visible neck masses  RESP: No audible wheeze, cough, or visible cyanosis.  No visible retractions or increased work of breathing.    NEURO: Awake, alert, responds appropriately to questions.  Mentation and speech fluent.  PSYCH:affect normal and appearance well-groomed.      DATA REVIEW  She was unable to upload her FreeStyle Mathieu for today's visit.        Again, thank you for allowing me to participate in the care of your patient.        Sincerely,        Kelly Yan PA-C

## 2024-01-10 NOTE — PROGRESS NOTES
Assessment/Plan :   1.Type 2 DM with long term use of insulin. Holly is doing great. She continues to monitor her blood sugars closely and she has been able to decrease her Lantus. Her daughter is currently taking Ozempic once weekly and Holly would like to give it a try. We discussed the possible adverse effects. We also discussed that it should lead to an even further decrease in daily insulin needs. I will send in a new prescription for Ozempic 0.25 mg weekly. She is to decrease the Lantus to 25 unit(s) when she starts the Ozempic. She is also overdue for an MTM appt. I encouraged her to contact Liz Calero RPH to schedule a follow-up. I will also send a message to Liz Calero RPH regarding the titration of Ozempic. If Holly has any problems, she is to contact our office. We will follow-up in 3 mos.    Due to the COVID 19 pandemic this visit was a telephone/video visit in order to help prevent spread of infection in this patient and the general population. The patient gave verbal consent for the visit today. I have independently reviewed and interpreted labs, imaging as indicated.       Distant Location (provider location):  Off-site  Mode of Communication:  Video Conference via [facility]  Chart review/prep time 5    Joined the call at 1/10/2024, 9:59:16 am.  Left the call at 1/10/2024, 10:09:12 am.  You were on the call for 9 minutes 55 seconds .  18 minutes spent on the date of the encounter doing chart review, history and exam, documentation and further activities as noted above.      Chief complaint:  Holly is a 68 year old female who returns for follow-up of Type 2 DM.    I have reviewed Care Everywhere including [facility], [facility], [place], [facility], [place], [facility], [facility] , [facility], [place] lab reports, imaging reports and provider notes as indicated.      HISTORY OF PRESENT ILLNESS  Overall, Holly is doing well. She remains stable on her current medications. She takes  pioglitazone 45 mg daily, metformin 2000 mg daily, glimepiride 4 mg twice daily and Lantus 30-35 unit(s) every day. She monitors her blood sugars with the FreeStyle Mathieu sensor. She states that her blood sugars have been up and down but okay. She is interested in starting a once weekly injection. Her daughter takes Ozempic and is doing really well.    Holly has had diabetes for many years. She has also used insulin therapy for many years. Recently, she has been working hard to make adjustments to her diet and to keep closer track of her blood sugars, in order to get her hemoglobin A1C under better control. Over the last year, her hemoglobin A1C has dropped from 9.1% to 7.4%.    Holly denies any recent problems with severe hyperglycemia and/or hypoglycemia. She does feel like her numbers have been fluctuating more, recently. She has been snacking on sugary foods more often. She also reports a few overnight low blood sugars. She has not had any problems with worsening vision or an increase in numbness/tingling in her feet. She also denies any issues with medication adverse effects.    Endocrine relevant labs are as follows:   Latest Reference Range & Units 12/01/23 13:01   Hemoglobin A1C 0.0 - 5.6 % 7.4 (H)   (H): Data is abnormally high   Latest Reference Range & Units 09/15/23 12:37   Albumin Urine mg/g Cr 0.00 - 25.00 mg/g Cr 64.26 (H)   (H): Data is abnormally high   Latest Reference Range & Units 09/15/23 12:37   Albumin Urine mg/L mg/L 36.5     REVIEW OF SYSTEMS    Endocrine: positive for diabetes  Skin: negative  Eyes: negative for, visual blurring, redness, tearing  Ears/Nose/Throat: negative  Respiratory: No shortness of breath, dyspnea on exertion, cough, or hemoptysis  Cardiovascular: negative for, chest pain, dyspnea on exertion, lower extremity edema, and exercise intolerance  Gastrointestinal: negative for, nausea, vomiting, constipation, and diarrhea  Genitourinary: negative for, nocturia, dysuria,  frequency, and urgency  Musculoskeletal: negative for, muscular weakness, nocturnal cramping, and foot pain  Neurologic: negative for, local weakness, numbness or tingling of hands, and numbness or tingling of feet  Psychiatric: negative  Hematologic/Lymphatic/Immunologic: negative    Past Medical History  Past Medical History:   Diagnosis Date    Bilateral knee pain     Cataracts, both eyes 5/8/2013    Cervical cancer (H)     Chronic kidney disease, stage 3a (H) 11/17/2021    Diabetes     Diabetic retinopathy (H)     HTN     Inflammatory arthritis     Major depression     Memory loss     Nephropathy     OA (osteoarthritis) of knee 9/11/2013    Other and unspecified hyperlipidemia     Pterygium eye     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 accu check guide strips 360 strip 3    blood glucose monitoring (NO BRAND SPECIFIED) meter device kit Use to test blood sugar 4  times daily or as directed. Accuceck Guide meter 1 kit 1    blood glucose monitoring (ONE TOUCH ULTRA 2) meter device kit USE TO TEST BLOOD SUGAR 4 TIMES DAILY OR AS DIRECTED. ACCUCECK GUIDE METER      blood glucose monitoring (ULTRA THIN 30G) lancets Use to test blood sugar 4  times daily or as directed.lancets for acchu check guide 400 each 3    buPROPion (WELLBUTRIN SR) 200 MG 12 hr tablet Take 1 tablet (200 mg) by mouth daily (with breakfast) 30 tablet 3    busPIRone HCl (BUSPAR) 30 MG  tablet TAKE 1 TABLET BY MOUTH TWICE A DAY 60 tablet 3    cetirizine (ZYRTEC) 10 MG tablet TAKE 1 TABLET (10 MG) BY MOUTH DAILY. 90 tablet 1    Continuous Blood Gluc  (FREESTYLE CM 2 READER) GALINA 1 Dose continuous 1 each 0    Continuous Blood Gluc Sensor (FREESTYLE CM 2 SENSOR) MISC Use one sensor every 14 days to read blood sugar 9 each 3    diclofenac (VOLTAREN) 1 % topical gel Apply 4 g topically 4 times daily 350 g 3    ELIQUIS ANTICOAGULANT 5 MG tablet TAKE 1 TABLET BY MOUTH TWICE A DAY 60 tablet 2    ferrous gluconate (FERGON) 324 (38 Fe) MG tablet Take 1 tablet (324 mg) by mouth daily (with breakfast) 90 tablet 3    gabapentin (NEURONTIN) 600 MG tablet TAKE 2 TABLETS (1,200 MG) BY MOUTH 3 TIMES DAILY FOR 30 DAYS 180 tablet 5    glimepiride (AMARYL) 2 MG tablet Take 2 tablets (4 mg) by mouth 2 times daily (before meals) 360 tablet 3    insulin aspart (NOVOLOG FLEXPEN) 100 UNIT/ML pen Using as needed for high blood sugars      insulin glargine (LANTUS SOLOSTAR) 100 UNIT/ML pen Inject 35-42 units at bedtime as directed + 2 units for priming of pen. Max dose 44 units. 45 mL 0    insulin pen needle (31G X 5 MM) 31G X 5 MM miscellaneous Use four times 4 day times a day 200 each 3    metFORMIN (GLUCOPHAGE XR) 500 MG 24 hr tablet Take 2 tablets (1,000 mg) by mouth 2 times daily (with meals) Take two tablets with breakfast and one tablet with dinner for one week then increase to two tablets with breakfast and two tablets with dinner thereafter 360 tablet 3    mirtazapine (REMERON) 7.5 MG tablet Take 1 tablet (7.5 mg) by mouth at bedtime 30 tablet 3    Multiple Vitamin (DAILY-ISAAC MULTIVITAMIN) TABS Take 1 tablet by mouth daily 90 tablet 0    naloxone (NARCAN) 4 MG/0.1ML nasal spray Spray 1 spray (4 mg) into one nostril alternating nostrils once as needed for opioid reversal every 2-3 minutes until assistance arrives 0.2 mL 0    nystatin (MYCOSTATIN) 534932 UNIT/GM external powder Apply 1 dose topically 3  times daily as needed 60 g 3    omeprazole (PRILOSEC) 20 MG DR capsule TAKE 2 CAPSULES (40 MG) BY MOUTH DAILY *TAKE 30-60 MINUTES BEFORE A MEAL* 180 capsule 2    PARoxetine (PAXIL) 40 MG tablet Take 1 tablet (40 mg) by mouth every evening 30 tablet 3    pioglitazone (ACTOS) 45 MG tablet Take 1 tablet (45 mg) by mouth daily 90 tablet 1    polyethylene glycol (MIRALAX) 17 g packet Take 17 g by mouth daily 7 packet 0    propranolol ER (INDERAL LA) 80 MG 24 hr capsule TAKE 1 CAPSULE BY MOUTH EVERY DAY 90 capsule 3    ramipril (ALTACE) 5 MG capsule TAKE 1 CAPSULE BY MOUTH EVERY DAY 90 capsule 2    rOPINIRole (REQUIP) 0.25 MG tablet Take 1 tablet (0.25 mg) by mouth every evening 90 tablet 0    simvastatin (ZOCOR) 20 MG tablet TAKE 1 TABLET BY MOUTH EVERYDAY AT BEDTIME 90 tablet 2    tiZANidine (ZANAFLEX) 2 MG tablet TAKE 1 TABLET BY MOUTH THREE TIMES A DAY AS NEEDED FOR MUSCLE SPASM 30 tablet 0    traZODone (DESYREL) 50 MG tablet Take 1 tablet (50 mg) by mouth at bedtime (Patient not taking: Reported on 1/10/2024) 30 tablet 3    vitamin C ([employer] VITAMIN C) 500 MG tablet 1/2 TABLET BY MOUTH JACOBSON WITH IRON SUPPLEMENT BEFORE MEAL 45 tablet 3    VITAMIN D3 25 MCG (1000 UT) tablet TAKE 1 TABLET BY MOUTH EVERY DAY 90 tablet 3       Allergies  Allergies   Allergen Reactions    Morphine Sulfate Itching       Family History  family history includes Cancer in her maternal grandfather and paternal grandfather; Cerebrovascular Disease in her mother; Diabetes in her brother, father, mother, and sister; Hypertension in her father; Multiple Sclerosis in her daughter; Thyroid Disease in her daughter and sister.    Social History  Social History     Tobacco Use    Smoking status: Former     Packs/day: 0     Types: Cigarettes     Start date: 1974     Quit date: 1975     Years since quittin.4     Passive exposure: Never    Smokeless tobacco: Never   Vaping Use    Vaping Use: Never used   Substance Use Topics    Alcohol use: Yes      Alcohol/week: 0.0 standard drinks of alcohol     Comment: social occasions    Drug use: No       Physical Exam  Body mass index is 33.47 kg/m .  GENERAL: no distress  SKIN: Visible skin clear. No significant rash, abnormal pigmentation or lesions.  EYES: Eyes grossly normal to inspection.  No discharge or erythema, or obvious scleral/conjunctival abnormalities.  NECK: visible goiter is not present; no visible neck masses  RESP: No audible wheeze, cough, or visible cyanosis.  No visible retractions or increased work of breathing.    NEURO: Awake, alert, responds appropriately to questions.  Mentation and speech fluent.  PSYCH:affect normal and appearance well-groomed.      DATA REVIEW  She was unable to upload her FreeStyle Mathieu for today's visit.

## 2024-01-10 NOTE — NURSING NOTE
Is the patient currently in the state of [place]? YES    Visit mode:VIDEO    If the visit is dropped, the patient can be reconnected by: VIDEO VISIT: Text to cell phone:   Telephone Information:   Mobile [phone]       Will anyone else be joining the visit? NO  (If patient encounters technical issues they should call [phone]    How would you like to obtain your AVS? MyChart    Are changes needed to the allergy or medication list? No    Reason for visit: RECHECK    Bobbilynn Grossaint VVF    Depression Response    Patient completed the PHQ-9 assessment for depression and scored >9? Yes  Question 9 on the PHQ-9 was positive for suicidality? No  Does patient have current mental health provider? Yes    Is this a virtual visit? Yes   Does patient have suicidal ideation (positive question 9)? No - offer to place Mental Health Referral.  Patient declined referral/not needed    I personally notified the following: visit provider

## 2024-01-11 ENCOUNTER — VIRTUAL VISIT (OUTPATIENT)
Dept: PSYCHIATRY | Facility: CLINIC | Age: 69
End: 2024-01-11
Payer: COMMERCIAL

## 2024-01-11 DIAGNOSIS — F51.04 PSYCHOPHYSIOLOGICAL INSOMNIA: ICD-10-CM

## 2024-01-11 DIAGNOSIS — F25.1 SCHIZOAFFECTIVE DISORDER, DEPRESSIVE TYPE (H): Primary | ICD-10-CM

## 2024-01-11 DIAGNOSIS — F41.1 GAD (GENERALIZED ANXIETY DISORDER): ICD-10-CM

## 2024-01-11 PROCEDURE — 99214 OFFICE O/P EST MOD 30 MIN: CPT | Mod: 95 | Performed by: NURSE PRACTITIONER

## 2024-01-11 RX ORDER — MIRTAZAPINE 7.5 MG/1
7.5 TABLET, FILM COATED ORAL AT BEDTIME
Qty: 30 TABLET | Refills: 3 | Status: SHIPPED | OUTPATIENT
Start: 2024-01-11 | End: 2024-04-11

## 2024-01-11 RX ORDER — BUSPIRONE HYDROCHLORIDE 30 MG/1
TABLET ORAL
Qty: 60 TABLET | Refills: 3 | Status: SHIPPED | OUTPATIENT
Start: 2024-01-11 | End: 2024-04-11

## 2024-01-11 RX ORDER — PAROXETINE 40 MG/1
40 TABLET, FILM COATED ORAL EVERY EVENING
Qty: 30 TABLET | Refills: 3 | Status: SHIPPED | OUTPATIENT
Start: 2024-01-11 | End: 2024-04-11

## 2024-01-11 RX ORDER — ARIPIPRAZOLE 20 MG/1
20 TABLET ORAL EVERY EVENING
Qty: 30 TABLET | Refills: 3 | Status: SHIPPED | OUTPATIENT
Start: 2024-01-11 | End: 2024-04-11

## 2024-01-11 RX ORDER — BUPROPION HYDROCHLORIDE 200 MG/1
200 TABLET, EXTENDED RELEASE ORAL
Qty: 30 TABLET | Refills: 3 | Status: SHIPPED | OUTPATIENT
Start: 2024-01-11 | End: 2024-04-11

## 2024-01-11 ASSESSMENT — PATIENT HEALTH QUESTIONNAIRE - PHQ9
SUM OF ALL RESPONSES TO PHQ QUESTIONS 1-9: 5
SUM OF ALL RESPONSES TO PHQ QUESTIONS 1-9: 5
10. IF YOU CHECKED OFF ANY PROBLEMS, HOW DIFFICULT HAVE THESE PROBLEMS MADE IT FOR YOU TO DO YOUR WORK, TAKE CARE OF THINGS AT HOME, OR GET ALONG WITH OTHER PEOPLE: NOT DIFFICULT AT ALL

## 2024-01-11 ASSESSMENT — PAIN SCALES - GENERAL: PAINLEVEL: MODERATE PAIN (4)

## 2024-01-11 NOTE — PROGRESS NOTES
"Virtual Visit Details    Type of service:  Video Visit   Video Start Time: 10:45 AM  Video End Time:11:04 AM    Originating Location (pt. Location): Home    Distant Location (provider location):  On-site  Platform used for Video Visit: [facility]           Outpatient Psychiatric Progress Note    Name: Holly Muir   : 1955                    Primary Care Provider: Tiffany King MD PhD   Therapist: None    PHQ-9 scores:      9/15/2023     2:43 PM 2023     9:41 AM 1/10/2024     9:46 AM   PHQ-9 SCORE   PHQ-9 Total Score MyChart 14 (Moderate depression) 16 (Moderately severe depression)    PHQ-9 Total Score 14 16 14       ISABELA-7 scores:      2023    10:08 AM 2023     9:49 AM 2023     9:43 AM   ISABELA-7 SCORE   Total Score 17 (severe anxiety) 8 (mild anxiety) 20 (severe anxiety)   Total Score 17 8 20       Patient Identification:    Patient is a 68 year old year old, single  White Not  or  female  who presents for return visit with me.  Patient is currently unemployed. Patient attended the session alone. Patient prefers to be called: \" Holly\".    Current medications include: acetaminophen (TYLENOL) 325 MG tablet, Take 2 tablets (650 mg) by mouth every 4 hours as needed for other (For optimal non-opioid multimodal pain management to improve pain control.)  ARIPiprazole (ABILIFY) 20 MG tablet, Take 1 tablet (20 mg) by mouth every evening  aspirin (ASA) 81 MG chewable tablet, Take 1 tablet (81 mg) by mouth daily  blood glucose (NO BRAND SPECIFIED) test strip, Use to test blood sugar 4 times daily or as directed. accu check guide strips  blood glucose monitoring (NO BRAND SPECIFIED) meter device kit, Use to test blood sugar 4  times daily or as directed. Accuceck Guide meter  blood glucose monitoring (ONE TOUCH ULTRA 2) meter device kit, USE TO TEST BLOOD SUGAR 4 TIMES DAILY OR AS DIRECTED. ACCUCECK GUIDE METER  blood glucose monitoring (ULTRA THIN 30G) lancets, Use to test blood sugar 4  " times daily or as directed.lancets for acchu check guide  buPROPion (WELLBUTRIN SR) 200 MG 12 hr tablet, Take 1 tablet (200 mg) by mouth daily (with breakfast)  busPIRone HCl (BUSPAR) 30 MG tablet, TAKE 1 TABLET BY MOUTH TWICE A DAY  cetirizine (ZYRTEC) 10 MG tablet, TAKE 1 TABLET (10 MG) BY MOUTH DAILY.  Continuous Blood Gluc  (FREESTYLE CM 2 READER) GALINA, 1 Dose continuous  Continuous Blood Gluc Sensor (FREESTYLE CM 2 SENSOR) [facility], Use one sensor every 14 days to read blood sugar  diclofenac (VOLTAREN) 1 % topical gel, Apply 4 g topically 4 times daily  ELIQUIS ANTICOAGULANT 5 MG tablet, TAKE 1 TABLET BY MOUTH TWICE A DAY  ferrous gluconate (FERGON) 324 (38 Fe) MG tablet, Take 1 tablet (324 mg) by mouth daily (with breakfast)  gabapentin (NEURONTIN) 600 MG tablet, TAKE 2 TABLETS (1,200 MG) BY MOUTH 3 TIMES DAILY FOR 30 DAYS  glimepiride (AMARYL) 2 MG tablet, Take 2 tablets (4 mg) by mouth 2 times daily (before meals)  insulin aspart (NOVOLOG FLEXPEN) 100 UNIT/ML pen, Using as needed for high blood sugars  insulin glargine (LANTUS SOLOSTAR) 100 UNIT/ML pen, Inject 35-42 units at bedtime as directed + 2 units for priming of pen. Max dose 44 units.  insulin pen needle (31G X 5 MM) 31G X 5 MM miscellaneous, Use four times 4 day times a day  metFORMIN (GLUCOPHAGE XR) 500 MG 24 hr tablet, Take 2 tablets (1,000 mg) by mouth 2 times daily (with meals) Take two tablets with breakfast and one tablet with dinner for one week then increase to two tablets with breakfast and two tablets with dinner thereafter  mirtazapine (REMERON) 7.5 MG tablet, Take 1 tablet (7.5 mg) by mouth at bedtime  Multiple Vitamin (DAILY-ISAAC MULTIVITAMIN) TABS, TAKE 1 TABLET BY MOUTH EVERY DAY  naloxone (NARCAN) 4 MG/0.1ML nasal spray, Spray 1 spray (4 mg) into one nostril alternating nostrils once as needed for opioid reversal every 2-3 minutes until assistance arrives  nystatin (MYCOSTATIN) 671896 UNIT/GM external powder, Apply 1 dose  topically 3 times daily as needed  omeprazole (PRILOSEC) 20 MG DR capsule, TAKE 2 CAPSULES (40 MG) BY MOUTH DAILY *TAKE 30-60 MINUTES BEFORE A MEAL*  PARoxetine (PAXIL) 40 MG tablet, Take 1 tablet (40 mg) by mouth every evening  pioglitazone (ACTOS) 45 MG tablet, Take 1 tablet (45 mg) by mouth daily  polyethylene glycol (MIRALAX) 17 g packet, Take 17 g by mouth daily  propranolol ER (INDERAL LA) 80 MG 24 hr capsule, TAKE 1 CAPSULE BY MOUTH EVERY DAY  ramipril (ALTACE) 5 MG capsule, TAKE 1 CAPSULE BY MOUTH EVERY DAY  rOPINIRole (REQUIP) 0.25 MG tablet, TAKE 1 TABLET (0.25 MG) BY MOUTH EVERY EVENING  semaglutide (OZEMPIC) 2 MG/3ML pen, Inject 0.25 mg Subcutaneous every 7 days  simvastatin (ZOCOR) 20 MG tablet, TAKE 1 TABLET BY MOUTH EVERYDAY AT BEDTIME  tiZANidine (ZANAFLEX) 2 MG tablet, TAKE 1 TABLET BY MOUTH THREE TIMES A DAY AS NEEDED FOR MUSCLE SPASM  vitamin C ([facility]S VITAMIN C) 500 MG tablet, 1/2 TABLET BY MOUTH JACOBSON WITH IRON SUPPLEMENT BEFORE MEAL  VITAMIN D3 25 MCG (1000 UT) tablet, TAKE 1 TABLET BY MOUTH EVERY DAY    triamcinolone (KENALOG-40) injection 40 mg         The [place] Prescription Monitoring Program has been reviewed and there are no concerns about diversionary activity for controlled substances at this time.      I was able to review most recent Primary Care Provider, specialty provider, and therapy visit notes that I have access to.     Today, patient reports she has been sleeping good.   Denies little depression.  She has been feeling good.  Denies anxiety.  Eating small meals and  is still hungry. Has to manage blood sugars.  She sees things passing her out of the corner of her eye.    She feels alert. Some knee pain -      has a past medical history of Bilateral knee pain, Cataracts, both eyes (5/8/2013), Cervical cancer (H), Chronic kidney disease, stage 3a (H) (11/17/2021), Diabetes, Diabetic retinopathy (H), HTN, Inflammatory arthritis, Major depression, Memory loss, Nephropathy, OA  (osteoarthritis) of knee (9/11/2013), Other and unspecified hyperlipidemia, Pterygium eye, and Sacral nerve root injury.    She has no past medical history of Amblyopia, Bleeding disorder (H24), Glaucoma, Glaucoma (increased eye pressure), Heart disease, Macular degeneration, Retinal detachment, Senile macular degeneration, Sleep apnea, Strabismus, Stroke (H), Surgical complications, Unspecified asthma(493.90), or Uveitis.    Social history updates:    She is going out to lunch today.  She lives with family members who are supportive    Substance use updates:    No alcohol use reported  Tobacco use: No    Vital Signs:   There were no vitals taken for this visit.    Labs:    Most recent laboratory results reviewed and no new labs.     Mental Status Examination:  Appearance: awake, alert and casual dress  Attitude: cooperative  Eye Contact:  adequate  Gait and Station: No dizziness or falls  Psychomotor Behavior:  intact station, gait and muscle tone  Oriented to:  time, person, and place  Attention Span and Concentration:  Normal  Speech:   vtspeech: paucity of speech, Speaks: English, and delayed responses  Mood:  anxious and depressed  Affect:  mood congruent  Associations:  no loose associations  Thought Process:  goal oriented  Thought Content:  no evidence of suicidal ideation or homicidal ideation, no auditory hallucinations present, and no visual hallucinations present  Recent and Remote Memory:  intact Not formally assessed. No amnesia.  Fund of Knowledge: appropriate  Insight:  good  Judgment: good  Impulse Control:  good    Suicide Risk Assessment:  Today Holly Muir reports no thoughts to harm themself or others. In addition, there are notable risk factors for self-harm, including anxiety, psychosis, and withdrawing. However, risk is mitigated by commitment to family, history of seeking help when needed, future oriented, denies suicidal intent or plan, and denies homicidal ideation, intent, or plan.  Therefore, based on all available evidence including the factors cited above, Holly Muir does not appear to be at imminent risk for self-harm, does not meet criteria for a 72-hr hold, and therefore remains appropriate for ongoing outpatient level of care.  A thorough assessment of risk factors related to suicide and self-harm have been reviewed and are noted above. The patient convincingly denies suicidality on several occasions. Local community safety resources printed and reviewed for patient to use if needed. There was no deceit detected, and the patient presented in a manner that was believable.     DSM5 Diagnosis:  295.70  (F25.1) Schizoaffective Disorder Depressive Type  300.02 (F41.1) Generalized Anxiety Disorder  780.52 (G47.00) Insomnia Disorder   With non-sleep disorder mental comorbidity  Episodic      Medical comorbidities include:   Patient Active Problem List    Diagnosis Date Noted    S/P total knee arthroplasty, right 11/03/2023     Priority: Medium    Osteoarthritis of right knee, unspecified osteoarthritis type 10/02/2023     Priority: Medium    F11.2 - Continuous opioid dependence (H) 09/15/2023     Priority: Medium    Noncompliance with medication regimen 09/07/2023     Priority: Medium     overuse of oxycodone, not taking gabapentin as scheduled.      Iron deficiency anemia secondary to inadequate dietary iron intake 08/04/2023     Priority: Medium    Other acute pulmonary embolism without acute cor pulmonale (H) 05/18/2023     Priority: Medium    Pneumonia 05/07/2023     Priority: Medium    Chronic right shoulder pain 02/17/2023     Priority: Medium    Chronic kidney disease, stage 3a (H) 11/17/2021     Priority: Medium    Restless leg syndrome 09/22/2021     Priority: Medium    Tremor 09/22/2021     Priority: Medium    Unspecified inflammatory spondylopathy, lumbar region (H24) 09/16/2021     Priority: Medium    Fecal urgency 05/20/2020     Priority: Medium     Added automatically from  request for surgery [id number]      Pseudophakia of right eye 10/25/2019     Priority: Medium    Anal stenosis 06/27/2019     Priority: Medium    S/P partial colectomy 06/27/2019     Priority: Medium    GERD (gastroesophageal reflux disease) 01/23/2019     Priority: Medium    Anemia 01/22/2019     Priority: Medium    Chest pain, musculoskeletal 01/22/2019     Priority: Medium    Diabetic neuropathy (H) 01/22/2019     Priority: Medium    Hyponatremia 01/22/2019     Priority: Medium    Schizoaffective disorder (H) 01/22/2019     Priority: Medium    Obesity (BMI 35.0-39.9) with comorbidity (H) 11/14/2018     Priority: Medium    Visual hallucination 04/21/2016     Priority: Medium    Type 2 diabetes mellitus with hyperglycemia, with long-term current use of insulin (H) 12/23/2015     Priority: Medium    Fecal incontinence due to anorectal disorder 09/28/2015     Priority: Medium    Anxiety 09/02/2015     Priority: Medium    Bilateral low back pain without sciatica 06/17/2015     Priority: Medium    Urinary retention 01/22/2015     Priority: Medium    Colostomy, evaluate (H) 10/17/2014     Priority: Medium    Encounter for long-term (current) use of insulin (H) 10/12/2014     Priority: Medium    Hypoglycemia due to insulin 09/17/2014     Priority: Medium    Rotator cuff impingement syndrome 08/07/2014     Priority: Medium    Health Care Home 09/20/2013     Priority: Medium     Date:  3-4-16  Status:  Accepted          Cataracts, both eyes 05/08/2013     Priority: Medium    Dermatochalasis 05/10/2012     Priority: Medium    Presbyopia 05/10/2012     Priority: Medium    OCD (obsessive compulsive disorder) 05/10/2012     Priority: Medium    HTN, goal below 140/90 05/10/2012     Priority: Medium    Pain in joint, hand 01/25/2012     Priority: Medium    Trigger finger, acquired 01/25/2012     Priority: Medium     Problem list name updated by automated process. Provider to review      Synovitis and tenosynovitis 01/25/2012      Priority: Medium     Problem list name updated by automated process. Provider to review      Mixed incontinence urge and stress 06/01/2010     Priority: Medium    Memory loss 06/01/2010     Priority: Medium     Diabetes coma around 2003      S/P colostomy (H) 04/03/2010     Priority: Medium     Due to injury to colon while she had a hysterectomy (age late 30s):  Seen Dr. Doherty in colorectal surgery at [facility]-does not want to operated. Needs her social support and memory loss issues be resolved first.       History of cervical cancer 09/23/2009     Priority: Medium     status post NATALIE/BSO. Being followed by Dr. Angelita Addison at [facility] gyn/onc      Severe major depression with psychotic features (H) 07/31/2009     Priority: Medium     Psychiatrist: Dr. Perkins at [facility].   Neuropsychological evalation at [facility] on 12/1/09: lower end cognitive functioning and multifactorial in etiologies including effects of psychosis, medications nd perhaps non-restorative sleep. Also has features of OCD. Recommend psychiatry referral for further evaluation of past Dx of schizophrenia and also psychotherapy.   Sees a therapist monthly now (has met goals) but will monitor for relapse       Hyperlipidemia LDL goal <100 01/27/2009     Priority: Medium    Primary generalized (osteo)arthritis 01/29/2014     Priority: Low       Assessment:    Holly Barrera feels stable on her current medication regiment.  Mirtazapine has been helpful in sustaining sleep for her and the trazodone has been discontinued.  Buspirone and paroxetine are stabilizing her anxiety moments.  Bupropion is being taken at breakfast and this change in time for the dose of medication has been helpful in giving her a chance to sleep better at night.  Aripiprazole continues in the evening as she still has fleeting visual hallucinations of dark objects moving around her room..    Medication side  effects and alternatives were reviewed. Health promotion activities recommended and reviewed today. All questions addressed. Education and counseling completed regarding risks and benefits of medications and psychotherapy options.    Treatment Plan:      1.  Continue aripiprazole 20 mg every evening  2.  Continue bupropion  mg daily with breakfast  3.  Continue buspirone 30 mg 2 times daily  4.  Continue mirtazapine 7.5 mg at bedtime  5.  Trazodone has been discontinued  6.  Continue paroxetine 40 mg in the evening    Continue all other medications as reviewed per electronic medical record today.   Safety plan reviewed. To the Emergency Department as needed or call after hours crisis line at [phone] or [phone]. [place] Crisis Text Line. Text [place] to 774357 or Suicide LifeLine Chat: StyleHop.org/chat/  To schedule individual or family therapy, call [facility] at [phone]  Schedule an appointment with me in 3 months or sooner as needed. Call [facility] at [phone] to schedule.  Follow up with primary care provider as planned or for acute medical concerns.  Call the psychiatric nurse line with medication questions or concerns at [phone]  MyChart may be used to communicate with your provider, but this is not intended to be used for emergencies.    Crisis Resources:    National Suicide Prevention Lifeline: 731.638.8358 (TTY: 589.487.2858). Call anytime for help.  (www.suicidepreventionlifeline.org)  National [place] on Mental Illness (www.haja.org): 120.853.1190 or 698-292-5080.   Mental Health Association (www.mentalhealth.org): 620.275.2882 or 964-219-9249.  [place] Crisis Text Line: Text [place] to 507429  Suicide LifeLine Chat: StyleHop.org/chat    Administrative Billing:   Time spent with patient includes counseling and coordination of care regarding above diagnoses and treatment plan.    Patient Status:  This is a  continuous care patient and medications will be prescribed by the psychiatric provider until further indicated.    Signed:   GHAZALA PoonP-BC   Psychiatry

## 2024-01-11 NOTE — PATIENT INSTRUCTIONS
"Patient Education   The Panel Psychiatry Program  What to Expect  Here's what to expect in the Panel Psychiatry Program.   About the program  You'll be meeting with a psychiatric doctor to check your mental health. A psychiatric doctor helps you deal with troubling thoughts and feelings by giving you medicine. They'll make sure you know the plan for your care. You may see them for a long time. When you're feeling better, they may refer you back to seeing your family doctor.   If you have any questions, we'll be glad to talk to you.  About visits  Be open  At your visits, please talk openly about your problems. It may feel hard, but it's the best way for us to help you.  Cancelling visits  If you can't come to your visit, please call us right away at [phone]. If you don't cancel at least 24 hours (1 full day) before your visit, that's \"late cancellation.\"  Not showing up for your visits  Being very late is the same as not showing up. You'll be a \"no show\" if:  You're more than 15 minutes late for a 30-minute (half hour) visit.  You're more than 30 minutes late for a 60-minute (full hour) visit.  If you cancel late or don't show up 2 times within 6 months, we may end your care.  Getting help between visits  If you need help between visits, you can call us Monday to Friday from 8 a.m. to 4:30 p.m. at [phone].  Emergency care  Call 911 or go to the nearest emergency department if your life or someone else's life is in danger.  Call 988 anytime to reach the national Suicide and Crisis hotline.  Medicine refills  To refill your medicine, call your pharmacy. You can also call [facility]'s Behavioral Access at [phone], Monday to Friday, 8 a.m. to 4:30 p.m. It can take 1 to 3 business days to get a refill.   Forms, letters, and tests  You may have papers to fill out, like FMLA, short-term disability, and workability. We can help you with these forms at your visits, but you must have an " appointment. You may need more than 1 visit for this, to be in an intensive therapy program, or both.  Before we can give you medicine for ADHD, we may refer you to get tested for it or confirm it another way.  We may not be able to give you an emotional support animal letter.  We don't do mental health checks ordered by the court.   We don't do mental health testing, but we can refer you to get tested.   Thank you for choosing us for your care.  For informational purposes only. Not to replace the advice of your health care provider. Copyright   2022 [facility] [employer]. All rights reserved. [employer] [id number] - 12/22.       Treatment Plan:      1.  Continue aripiprazole 20 mg every evening  2.  Continue bupropion  mg daily with breakfast  3.  Continue buspirone 30 mg 2 times daily  4.  Continue mirtazapine 7.5 mg at bedtime  5.  Trazodone has been discontinued  6.  Continue paroxetine 40 mg in the evening    Continue all other medications as reviewed per electronic medical record today.   Safety plan reviewed. To the Emergency Department as needed or call after hours crisis line at [phone] or [phone]. [place] Crisis Text Line. Text [place] to 171948 or Suicide LifeLine Chat: suicidepreventionReelGenieline.org/chat/  To schedule individual or family therapy, call [facility] at [phone]  Schedule an appointment with me in 3 months or sooner as needed. Call [facility] at [phone] to schedule.  Follow up with primary care provider as planned or for acute medical concerns.  Call the psychiatric nurse line with medication questions or concerns at [phone]  MyChart may be used to communicate with your provider, but this is not intended to be used for emergencies.    Crisis Resources:    National Suicide Prevention Lifeline: 832.120.4096 (TTY: 327.315.8696). Call anytime for help.  (www.suicidepreventionlifeline.org)  National [place] on Mental Illness  (www.haja.org): 086-437-6330 or 070-844-8200.   Mental Health Association (www.mentalhealth.org): 161.562.9129 or 495-450-1745.  [place] Crisis Text Line: Text [place] to 506849  Suicide LifeLine Chat: suicidepreventionlifeline.org/chat

## 2024-01-11 NOTE — NURSING NOTE
Is the patient currently in the state of [place]? YES    Visit mode:VIDEO    If the visit is dropped, the patient can be reconnected by: VIDEO VISIT: Text to cell phone:   Telephone Information:   Mobile [phone]       Will anyone else be joining the visit? NO  (If patient encounters technical issues they should call [phone]    How would you like to obtain your AVS? MyChart    Are changes needed to the allergy or medication list? No    Reason for visit: RECHECK    Ingrid QUEZADA

## 2024-01-12 ENCOUNTER — ANCILLARY PROCEDURE (OUTPATIENT)
Dept: CT IMAGING | Facility: CLINIC | Age: 69
End: 2024-01-12
Attending: FAMILY MEDICINE
Payer: COMMERCIAL

## 2024-01-12 DIAGNOSIS — M54.16 LUMBAR RADICULOPATHY: ICD-10-CM

## 2024-01-12 PROCEDURE — 72131 CT LUMBAR SPINE W/O DYE: CPT | Performed by: RADIOLOGY

## 2024-01-12 NOTE — TELEPHONE ENCOUNTER
Prior Authorization Not Needed per Insurance    Medication: OZEMPIC (0.25 OR 0.5 MG/DOSE) 2 MG/3ML SC SOPN  Insurance Company: [employer] - Phone [phone] Fax [phone]  Expected CoPay: $    Pharmacy Filling the Rx: [id number] - [place], [place] - [address] AT [facility]        Prior auth not required, ICD-10 code (E11.9) needs to  be added into the additional information for claim to process. $0.00 copay, Rx released to the filling pharmacy. Encounter closed.

## 2024-01-16 ENCOUNTER — VIRTUAL VISIT (OUTPATIENT)
Dept: FAMILY MEDICINE | Facility: CLINIC | Age: 69
End: 2024-01-16
Payer: COMMERCIAL

## 2024-01-16 ENCOUNTER — TELEPHONE (OUTPATIENT)
Dept: FAMILY MEDICINE | Facility: CLINIC | Age: 69
End: 2024-01-16

## 2024-01-16 DIAGNOSIS — F32.3 SEVERE MAJOR DEPRESSION WITH PSYCHOTIC FEATURES (H): ICD-10-CM

## 2024-01-16 DIAGNOSIS — D63.1 ANEMIA DUE TO STAGE 3A CHRONIC KIDNEY DISEASE (H): ICD-10-CM

## 2024-01-16 DIAGNOSIS — E11.42 DIABETIC POLYNEUROPATHY ASSOCIATED WITH TYPE 2 DIABETES MELLITUS (H): ICD-10-CM

## 2024-01-16 DIAGNOSIS — F41.1 GAD (GENERALIZED ANXIETY DISORDER): ICD-10-CM

## 2024-01-16 DIAGNOSIS — Z96.651 STATUS POST RIGHT KNEE REPLACEMENT: Primary | ICD-10-CM

## 2024-01-16 DIAGNOSIS — Z93.3 S/P COLOSTOMY (H): ICD-10-CM

## 2024-01-16 DIAGNOSIS — Z12.31 VISIT FOR SCREENING MAMMOGRAM: ICD-10-CM

## 2024-01-16 DIAGNOSIS — N18.31 ANEMIA DUE TO STAGE 3A CHRONIC KIDNEY DISEASE (H): ICD-10-CM

## 2024-01-16 DIAGNOSIS — E66.01 MORBID OBESITY (H): ICD-10-CM

## 2024-01-16 PROBLEM — F11.20 CONTINUOUS OPIOID DEPENDENCE (H): Status: RESOLVED | Noted: 2023-09-15 | Resolved: 2024-01-16

## 2024-01-16 PROBLEM — M46.96 UNSPECIFIED INFLAMMATORY SPONDYLOPATHY, LUMBAR REGION (H): Status: RESOLVED | Noted: 2021-09-16 | Resolved: 2024-01-16

## 2024-01-16 PROBLEM — I26.99 OTHER ACUTE PULMONARY EMBOLISM WITHOUT ACUTE COR PULMONALE (H): Status: RESOLVED | Noted: 2023-05-18 | Resolved: 2024-01-16

## 2024-01-16 PROCEDURE — 96127 BRIEF EMOTIONAL/BEHAV ASSMT: CPT | Mod: 95 | Performed by: INTERNAL MEDICINE

## 2024-01-16 PROCEDURE — 99214 OFFICE O/P EST MOD 30 MIN: CPT | Mod: 95 | Performed by: INTERNAL MEDICINE

## 2024-01-16 ASSESSMENT — ANXIETY QUESTIONNAIRES
1. FEELING NERVOUS, ANXIOUS, OR ON EDGE: MORE THAN HALF THE DAYS
3. WORRYING TOO MUCH ABOUT DIFFERENT THINGS: NEARLY EVERY DAY
IF YOU CHECKED OFF ANY PROBLEMS ON THIS QUESTIONNAIRE, HOW DIFFICULT HAVE THESE PROBLEMS MADE IT FOR YOU TO DO YOUR WORK, TAKE CARE OF THINGS AT HOME, OR GET ALONG WITH OTHER PEOPLE: SOMEWHAT DIFFICULT
6. BECOMING EASILY ANNOYED OR IRRITABLE: NEARLY EVERY DAY
2. NOT BEING ABLE TO STOP OR CONTROL WORRYING: NEARLY EVERY DAY
7. FEELING AFRAID AS IF SOMETHING AWFUL MIGHT HAPPEN: NEARLY EVERY DAY
GAD7 TOTAL SCORE: 20
GAD7 TOTAL SCORE: 20
5. BEING SO RESTLESS THAT IT IS HARD TO SIT STILL: NEARLY EVERY DAY

## 2024-01-16 ASSESSMENT — PATIENT HEALTH QUESTIONNAIRE - PHQ9
SUM OF ALL RESPONSES TO PHQ QUESTIONS 1-9: 21
5. POOR APPETITE OR OVEREATING: NEARLY EVERY DAY

## 2024-01-16 NOTE — TELEPHONE ENCOUNTER
Pt called the clinic to ask provider to place an order for DM shoes for her. She said she forgot to mention this during her virtual appt today with provider.    Pt is asking to  the DME order from the . Once she has a signed order she will contact her  and figure out where to get them from.    Pt asking for a call back when order is ready to be picked up - ok to LVM with detailed info

## 2024-01-16 NOTE — PROGRESS NOTES
"    Instructions Relayed to Patient by Virtual Roomer:     Patient is active on [facility]:   Relayed following to patient: \"It looks like you are active on [facility], are you able to join the visit this way? If not, do you need us to send you a link now or would you like your provider to send a link via text or email when they are ready to initiate the visit?\"    Reminded patient to ensure they were logged on to virtual visit by arrival time listed. Documented in appointment notes if patient had flexibility to initiate visit sooner than arrival time. If pediatric virtual visit, ensured pediatric patient along with parent/guardian will be present for video visit.     Patient offered the website [email or web address] and/or phone number to [facility] Help line: [phone]   Holly is a 68 year old who is being evaluated via a billable video visit.      How would you like to obtain your AVS? GetFresh  If the video visit is dropped, the invitation should be resent by: Text to cell phone: [phone]  Will anyone else be joining your video visit? No          Assessment & Plan     Holly was seen today for depression and anxiety.    Diagnoses and all orders for this visit:    Status post right knee replacement  Comments:  doing well. continue with PT. no longer on opioids.    Diabetic polyneuropathy associated with type 2 diabetes mellitus (H)  Comments:  on gabapentin. stable.    Visit for screening mammogram  -     MA SCREENING DIGITAL BILAT - Future  (s+30); Future    S/P colostomy (H)  Comments:  stable    Morbid obesity (H)  Comments:  on Ozempic as part of diabetes management per endocrine.    Anemia due to stage 3a chronic kidney disease (H)  Comments:  heomoglobin stalbe.    Severe major depression with psychotic features (H)  Comments:  continue to follow with psychiatrist and therapist.  Orders:  -     [place] BEHAV ASSMT W/SCORE & DOCD/STAND INSTRUMENT    ISABELA (generalized anxiety " "disorder)  Comments:  continue to follow with psychiatrist and therapist.  Orders:  -     PA BEHAV ASSMT W/SCORE & DOCD/STAND INSTRUMENT          BMI:   Estimated body mass index is 33.47 kg/m  as calculated from the following:    Height as of 1/10/24: 1.575 m (5' 2\").    Weight as of 1/10/24: 83 kg (183 lb).   Weight management plan: on Ozempic    Return in 6 months for annual wellness and labs.     Tiffany King MD PhD  [facility]    Rebeka Barrera is a 68 year old, presenting for the following health issues:  Depression and Anxiety        Right knee replaced last October. Doing well. Walking with a cane.  Has a little of pain, little aches of the knee. The right leg gets hard. Tylenol helps. Initially she was given oxycodone post surgery but hasn't needed it and hasn't needed Tramadol.     Had a bout of depression/anxiety with uncontrolled crying, wasn't herself. Sometimes she gets the blue from no where.about 1-2 weeks ago. Her therapist Mary Jane talked her though it and she felt better. She had a visit with her psychiatrist last week. She took her off thorazine. Gave a stronger medicine for sleep. Now she is sleeping well. Only got up once instead of 4-5 times.              Depression and Anxiety Follow-Up  How are you doing with your depression since your last visit? Improved   How are you doing with your anxiety since your last visit?  Improved   Are you having other symptoms that might be associated with depression or anxiety? Yes:  has been crying  Have you had a significant life event? No   Do you have any concerns with your use of alcohol or other drugs? No    Social History     Tobacco Use    Smoking status: Former     Packs/day: 0     Types: Cigarettes     Start date: 1974     Quit date: 1975     Years since quittin.4     Passive exposure: Never    Smokeless tobacco: Never   Vaping Use    Vaping Use: Never used   Substance Use Topics    Alcohol use: Yes     " Alcohol/week: 0.0 standard drinks of alcohol     Comment: social occasions    Drug use: No         1/10/2024     9:46 AM 1/11/2024    10:35 AM 1/16/2024    10:24 AM   PHQ   PHQ-9 Total Score 14 5 21   Q9: Thoughts of better off dead/self-harm past 2 weeks Not at all Not at all Not at all         7/14/2023     9:49 AM 11/21/2023     9:43 AM 1/16/2024    10:26 AM   ISABELA-7 SCORE   Total Score 8 (mild anxiety) 20 (severe anxiety)    Total Score 8 20 20         Suicide Assessment Five-step Evaluation and Treatment (SAFE-T)    How many servings of fruits and vegetables do you eat daily?  2-3  On average, how many sweetened beverages do you drink each day (Examples: soda, juice, sweet tea, etc.  Do NOT count diet or artificially sweetened beverages)?   0  How many days per week do you exercise enough to make your heart beat faster? 3 or less  How many minutes a day do you exercise enough to make your heart beat faster? 9 or less  How many days per week do you miss taking your medication? 0        Review of Systems   Constitutional, HEENT, cardiovascular, pulmonary, gi and gu systems are negative, except as otherwise noted.      Objective           Vitals:  No vitals were obtained today due to virtual visit.    Physical Exam   GENERAL: Healthy, alert and no distress  EYES: Eyes grossly normal to inspection.  No discharge or erythema, or obvious scleral/conjunctival abnormalities.  RESP: No audible wheeze, cough, or visible cyanosis.  No visible retractions or increased work of breathing.    SKIN: Visible skin clear. No significant rash, abnormal pigmentation or lesions.  NEURO: Cranial nerves grossly intact.  Mentation and speech appropriate for age.  PSYCH: Mentation appears normal, affect normal/bright, judgement and insight intact, normal speech and appearance well-groomed.            Video-Visit Details    Type of service:  Video Visit   Video Start Time: 11:52 AM  Video End Time:12:02 PM    Originating Location (pt.  Location): Home    Distant Location (provider location):  Off-site  Platform used for Video Visit: Faisal

## 2024-01-17 ENCOUNTER — OFFICE VISIT (OUTPATIENT)
Dept: AUDIOLOGY | Facility: CLINIC | Age: 69
End: 2024-01-17
Payer: COMMERCIAL

## 2024-01-17 ENCOUNTER — PATIENT OUTREACH (OUTPATIENT)
Dept: GERIATRIC MEDICINE | Facility: CLINIC | Age: 69
End: 2024-01-17

## 2024-01-17 DIAGNOSIS — H90.3 SENSORINEURAL HEARING LOSS (SNHL) OF BOTH EARS: Primary | ICD-10-CM

## 2024-01-17 PROCEDURE — V5010 ASSESSMENT FOR HEARING AID: HCPCS | Performed by: AUDIOLOGIST

## 2024-01-17 NOTE — PROGRESS NOTES
[facility] Care Coordination Contact      Member called Care Coordinator to ask where she would get diabetic shoes near her home ([place]) Care Coordinator found one near her     [facility]  Medical Equipment and Supplies    [address], [place], [place], [address]    P: [phone]    Care Coordinator attempted to give her this information but she reports that she was in the cab heading home from an appointment and cannot write it down. She said she will call Care Coordinator back when she gets home.     Malathi Bacon RN PHN  Care Coordinator  [facility]  O: [phone]  C:[phone]  F:[phone]

## 2024-01-17 NOTE — PROGRESS NOTES
AUDIOLOGY REPORT    BACKGROUND INFORMATION: Holly Muir was seen in the Audiology Clinic at [facility] on 1/17/2024 for follow-up.  The patient has been seen previously in this clinic on 3/08/2022 and results revealed a mild to moderate sensorineural hearing loss bilaterally.  The patient was fit with [employer]  hearing aids on 6/01/2022.  The patient reports that her hearing aid are weak.    TEST RESULTS AND PROCEDURES: An electroacoustic hearing aid check was performed.  The right hearing aid shell was observed to be cracked. The left device was still weak following cleaning. It was mutually decided to send both devices to [facility] for service.     SUMMARY AND RECOMMENDATIONS: A hearing aid check was performed today and we will contact the patient when her hearing aid are back from repair.  Call this clinic with questions regarding today s visit.     Cherie Powers.  Doctor of Audiology  [place] License # [id number]

## 2024-01-18 ENCOUNTER — TELEPHONE (OUTPATIENT)
Dept: FAMILY MEDICINE | Facility: CLINIC | Age: 69
End: 2024-01-18
Payer: COMMERCIAL

## 2024-01-18 NOTE — TELEPHONE ENCOUNTER
Order/Referral Request    Who is requesting: Endodontologist     Orders being requested: referral to talk with them so she can get diabetes shoes     Reason service is needed/diagnosis: Diabetes     When are orders needed by: ASAP    Has this been discussed with Provider: No    Does patient have a preference on a Group/Provider/Facility? NA    Does patient have an appointment scheduled?: No    Where to send orders:  Unknown    Could we send this information to you in [facility] or would you prefer to receive a phone call?:   Call Back  Okay to leave a detailed message?: Yes at Cell number on file:    Telephone Information:   Mobile [phone]

## 2024-01-18 NOTE — TELEPHONE ENCOUNTER
Left detailed information on pt VM informing her of this and she is to call back if she has questions otherwise she can reach out to   Kelly Yan PA-C  Endocrinology, Diabetes, and Metabolism    Which is the provider she had virtual visit with on 1/10/24.    [facility]

## 2024-01-18 NOTE — TELEPHONE ENCOUNTER
Called pt and she stated that her insurance will not let her schedule visit with endocrinologist but she will call and see what she can do.

## 2024-01-18 NOTE — TELEPHONE ENCOUNTER
Patient will need to contact her endocrinologist for this. The provider who manages her diabetes needs to write the order (this is an insurance requirement).

## 2024-01-19 ENCOUNTER — THERAPY VISIT (OUTPATIENT)
Dept: PHYSICAL THERAPY | Facility: CLINIC | Age: 69
End: 2024-01-19
Payer: COMMERCIAL

## 2024-01-19 DIAGNOSIS — Z96.651 S/P TOTAL KNEE ARTHROPLASTY, RIGHT: Primary | ICD-10-CM

## 2024-01-19 DIAGNOSIS — F33.3 SEVERE RECURRENT MAJOR DEPRESSIVE DISORDER WITH PSYCHOTIC FEATURES (H): ICD-10-CM

## 2024-01-19 PROCEDURE — 97110 THERAPEUTIC EXERCISES: CPT | Mod: GP

## 2024-01-19 PROCEDURE — 97112 NEUROMUSCULAR REEDUCATION: CPT | Mod: GP

## 2024-01-19 RX ORDER — CHOLECALCIFEROL (VITAMIN D3) 25 MCG
TABLET ORAL
Qty: 90 TABLET | Refills: 3 | Status: SHIPPED | OUTPATIENT
Start: 2024-01-19

## 2024-01-23 ENCOUNTER — PATIENT OUTREACH (OUTPATIENT)
Dept: CARE COORDINATION | Facility: CLINIC | Age: 69
End: 2024-01-23
Payer: COMMERCIAL

## 2024-01-24 ENCOUNTER — PATIENT OUTREACH (OUTPATIENT)
Dept: GERIATRIC MEDICINE | Facility: CLINIC | Age: 69
End: 2024-01-24

## 2024-01-24 ENCOUNTER — THERAPY VISIT (OUTPATIENT)
Dept: PHYSICAL THERAPY | Facility: CLINIC | Age: 69
End: 2024-01-24
Payer: COMMERCIAL

## 2024-01-24 DIAGNOSIS — Z96.651 S/P TOTAL KNEE ARTHROPLASTY, RIGHT: Primary | ICD-10-CM

## 2024-01-24 PROCEDURE — 97110 THERAPEUTIC EXERCISES: CPT | Mod: GP

## 2024-01-24 ASSESSMENT — LIFESTYLE VARIABLES
SKIP TO QUESTIONS 9-10: 1
AUDIT-C TOTAL SCORE: 0

## 2024-01-24 NOTE — PROGRESS NOTES
Chief Complaint: Surgical Followup (3mo s.p RTKA 10/2/23)         HPI: Holly Muir returns today in follow-up for her knee. She reports that she is doing well. SHe has reached goal for ROM, is walking well-- using a cane for balance but not for her knee, and is feel better over time. Happy with how she is doing.   Medications and allergies are documented in the EMR and have been reviewed.      Current Outpatient Medications:     acetaminophen (TYLENOL) 325 MG tablet, Take 2 tablets (650 mg) by mouth every 4 hours as needed for other (For optimal non-opioid multimodal pain management to improve pain control.), Disp: 100 tablet, Rfl: 0    ARIPiprazole (ABILIFY) 20 MG tablet, Take 1 tablet (20 mg) by mouth every evening, Disp: 30 tablet, Rfl: 3    aspirin (ASA) 81 MG chewable tablet, Take 1 tablet (81 mg) by mouth daily, Disp: 90 tablet, Rfl: 1    blood glucose (NO BRAND SPECIFIED) test strip, Use to test blood sugar 4 times daily or as directed. accu check guide strips, Disp: 360 strip, Rfl: 3    blood glucose monitoring (NO BRAND SPECIFIED) meter device kit, Use to test blood sugar 4  times daily or as directed. Accuceck Guide meter, Disp: 1 kit, Rfl: 1    blood glucose monitoring (ONE TOUCH ULTRA 2) meter device kit, USE TO TEST BLOOD SUGAR 4 TIMES DAILY OR AS DIRECTED. ACCUCECK GUIDE METER, Disp: , Rfl:     blood glucose monitoring (ULTRA THIN 30G) lancets, Use to test blood sugar 4  times daily or as directed.lancets for acchu check guide, Disp: 400 each, Rfl: 3    buPROPion (WELLBUTRIN SR) 200 MG 12 hr tablet, Take 1 tablet (200 mg) by mouth daily (with breakfast), Disp: 30 tablet, Rfl: 3    busPIRone HCl (BUSPAR) 30 MG tablet, TAKE 1 TABLET BY MOUTH TWICE A DAY, Disp: 60 tablet, Rfl: 3    cetirizine (ZYRTEC) 10 MG tablet, TAKE 1 TABLET (10 MG) BY MOUTH DAILY., Disp: 90 tablet, Rfl: 1    Continuous Blood Gluc  (FREESTYLE CM 2 READER) GALINA, 1 Dose continuous, Disp: 1 each, Rfl: 0    Continuous Blood  Gluc Sensor (FREESTYLE CM 2 SENSOR) [facility], Use one sensor every 14 days to read blood sugar, Disp: 9 each, Rfl: 3    diclofenac (VOLTAREN) 1 % topical gel, Apply 4 g topically 4 times daily, Disp: 350 g, Rfl: 3    ELIQUIS ANTICOAGULANT 5 MG tablet, TAKE 1 TABLET BY MOUTH TWICE A DAY, Disp: 60 tablet, Rfl: 2    ferrous gluconate (FERGON) 324 (38 Fe) MG tablet, Take 1 tablet (324 mg) by mouth daily (with breakfast), Disp: 90 tablet, Rfl: 3    gabapentin (NEURONTIN) 600 MG tablet, TAKE 2 TABLETS (1,200 MG) BY MOUTH 3 TIMES DAILY FOR 30 DAYS, Disp: 180 tablet, Rfl: 5    glimepiride (AMARYL) 2 MG tablet, Take 2 tablets (4 mg) by mouth 2 times daily (before meals), Disp: 360 tablet, Rfl: 3    insulin aspart (NOVOLOG FLEXPEN) 100 UNIT/ML pen, Using as needed for high blood sugars, Disp: , Rfl:     insulin glargine (LANTUS SOLOSTAR) 100 UNIT/ML pen, Inject 35-42 units at bedtime as directed + 2 units for priming of pen. Max dose 44 units., Disp: 45 mL, Rfl: 0    insulin pen needle (31G X 5 MM) 31G X 5 MM miscellaneous, Use four times 4 day times a day, Disp: 200 each, Rfl: 3    metFORMIN (GLUCOPHAGE XR) 500 MG 24 hr tablet, Take 2 tablets (1,000 mg) by mouth 2 times daily (with meals) Take two tablets with breakfast and one tablet with dinner for one week then increase to two tablets with breakfast and two tablets with dinner thereafter, Disp: 360 tablet, Rfl: 3    mirtazapine (REMERON) 7.5 MG tablet, Take 1 tablet (7.5 mg) by mouth at bedtime, Disp: 30 tablet, Rfl: 3    Multiple Vitamin (DAILY-ISAAC MULTIVITAMIN) TABS, TAKE 1 TABLET BY MOUTH EVERY DAY, Disp: 90 tablet, Rfl: 0    naloxone (NARCAN) 4 MG/0.1ML nasal spray, Spray 1 spray (4 mg) into one nostril alternating nostrils once as needed for opioid reversal every 2-3 minutes until assistance arrives, Disp: 0.2 mL, Rfl: 0    nystatin (MYCOSTATIN) 396368 UNIT/GM external powder, Apply 1 dose topically 3 times daily as needed, Disp: 60 g, Rfl: 3    omeprazole (PRILOSEC)  20 MG DR capsule, TAKE 2 CAPSULES (40 MG) BY MOUTH DAILY *TAKE 30-60 MINUTES BEFORE A MEAL*, Disp: 180 capsule, Rfl: 2    PARoxetine (PAXIL) 40 MG tablet, Take 1 tablet (40 mg) by mouth every evening, Disp: 30 tablet, Rfl: 3    pioglitazone (ACTOS) 45 MG tablet, Take 1 tablet (45 mg) by mouth daily, Disp: 90 tablet, Rfl: 1    polyethylene glycol (MIRALAX) 17 g packet, Take 17 g by mouth daily, Disp: 7 packet, Rfl: 0    propranolol ER (INDERAL LA) 80 MG 24 hr capsule, TAKE 1 CAPSULE BY MOUTH EVERY DAY, Disp: 90 capsule, Rfl: 3    ramipril (ALTACE) 5 MG capsule, TAKE 1 CAPSULE BY MOUTH EVERY DAY, Disp: 90 capsule, Rfl: 2    rOPINIRole (REQUIP) 0.25 MG tablet, TAKE 1 TABLET (0.25 MG) BY MOUTH EVERY EVENING, Disp: 90 tablet, Rfl: 0    semaglutide (OZEMPIC) 2 MG/3ML pen, Inject 0.25 mg Subcutaneous every 7 days, Disp: 3 mL, Rfl: 0    simvastatin (ZOCOR) 20 MG tablet, TAKE 1 TABLET BY MOUTH EVERYDAY AT BEDTIME, Disp: 90 tablet, Rfl: 2    tiZANidine (ZANAFLEX) 2 MG tablet, TAKE 1 TABLET BY MOUTH THREE TIMES A DAY AS NEEDED FOR MUSCLE SPASM, Disp: 30 tablet, Rfl: 0    vitamin C (CVS VITAMIN C) 500 MG tablet, 1/2 TABLET BY MOUTH JACOBSON WITH IRON SUPPLEMENT BEFORE MEAL, Disp: 45 tablet, Rfl: 3    VITAMIN D3 25 MCG (1000 UT) tablet, TAKE 1 TABLET BY MOUTH EVERY DAY, Disp: 90 tablet, Rfl: 3    Current Facility-Administered Medications:     triamcinolone (KENALOG-40) injection 40 mg, 40 mg, , , Santy Harrington, DO, 40 mg at 01/08/24 1612    Allergies: Morphine sulfate    Current Status:  JOSE LUIS Jr: 76.33  [facility]:  Global Mental Health Score - (P) 15  Global Physical Health Score - (P) 12  PROMIS TOTAL - SUBSCORES - (P) 27    Physical Exam:  On physical examination the patient appears the stated age, is in no acute distress, affect is appropriate, and breathing is non-labored.    There were no vitals taken for this visit.  There is no height or weight on file to calculate BMI.    Rises from chair:easily   Gait: rapidly with a cane,  good heel strike   Appearance: benign  Clinical alignment:neutral   Effusion:no  Tenderness to palpation: min  Extension:0  Flexion: 120  Patellar tracking:normal   Collateral ligaments: intact  Stable in in the sagittal plane in mid-flexion.    Assessment: doing well. Discussed activities on total knee and discussed typcial follow-up. All the patient's questions were answered to the best of my ability.     Plan: RTC in 12 months, sooner if issues.   No ref. provider found

## 2024-01-24 NOTE — Clinical Note
Hello- you are receiving the message because this member is on the state program MSHO/MSC+. As the Care Coordinator we are required to notify the Physician when we complete an annual assessment for this program. If you have further questions please contact me.  Malathi Bacon RN [facility] Care Coordinator [phone]

## 2024-01-25 ENCOUNTER — TELEPHONE (OUTPATIENT)
Dept: PHARMACY | Facility: CLINIC | Age: 69
End: 2024-01-25
Payer: COMMERCIAL

## 2024-01-25 NOTE — TELEPHONE ENCOUNTER
Called patient to schedule a follow up MTM appointment. LM for patient to return call to schedule an appointment.    Liz Calero, Pharm.D, BCACP  Medication Therapy Management Pharmacist

## 2024-01-26 ASSESSMENT — PATIENT HEALTH QUESTIONNAIRE - PHQ9: SUM OF ALL RESPONSES TO PHQ QUESTIONS 1-9: 5

## 2024-01-26 NOTE — PROGRESS NOTES
"[facility] Care Coordination Contact    [facility] Home Visit Assessment     Home visit for Health Risk Assessment with Holly Muir completed on January 24, 2024    Type of residence:: Private home - stairs  Current living arrangement:: I live in a private home with family     Assessment completed with:: Patient    Current Care Plan  Member currently receiving the following home care services:     Member currently receiving the following community resources: On CADI waiver: PCA, Meals on Wheels, Lifeline      Medication Review  Medication reconciliation completed in Epic: If no, please explain: member declined a mediation review, states \"too many medications to go through\"  Medication set-up & administration: Family/informal caregiver sets up weekly.  Self-administers medications and caregiver provided reminders .  Medication Risk Assessment Medication (1 or more, place referral to MTM): N/A: No risk factors identified  MTM Referral Placed: No: No risk factors idenified, has had MTM in the past.     Mental/Behavioral Health   Depression Screening:   PHQ-2 Total Score (Adult) - Positive if 3 or more points; Administer PHQ-9 if positive: (!) 4  PHQ-9 Total Score: 5    Mental health DX:: No        Falls Assessment:   Fallen 2 or more times in the past year?: Yes   Any fall with injury in the past year?: No    ADL/IADL Dependencies:   Dependent ADLs:: Ambulation-cane, Bathing, Dressing, Transfers, Positioning  Dependent IADLs:: Cleaning, Cooking, Laundry, Shopping, Meal Preparation, Medication Management, Transportation, Money Management    Health [facility] benefits: [facility]: Shared information regarding [employer]. Reviewed preventative health screening and health plan supplemental benefits/incentives. Reviewed medication disposal form.    PCA Assessment completed at visit: No [facility]  will complete the PCA assessment.     Elderly Waiver Eligibility: NA- member is on " GEORGE lincoln.     Care Plan & Recommendations: HRA completed at member home today. She reports that she feels like she is doing ok in some areas and struggling in others. She reports that her left knee (surgery 10/2023) is doing well. She walks with a cane and reports that she would like to get her right knee done this year. Her diabetes remains a concerns, her A1C is around 7 and she reports her endo wrote her a script for ozempic to help her loose weight and lower her A1C further. She feels that her anxiety is her biggest concerns, she feels like she may need to talk with her MH provider ([facility]) about her medications.     See [facility] for detailed assessment information.    Follow-Up Plan: Member informed of future contact, plan to f/u with member with a 6 month telephone assessment.  Contact information shared with member and family, encouraged member to call with any questions or concerns at any time.    [facility] care continuum providers: Please see Snapshot and Care Management Flowsheets for Specific details of care plan.    This CC note routed to PCP, Tiffany King.    Malathi Bacon RN PHN  Care Coordinator  [facility]  O: [phone]  C:[phone]  F:[phone]

## 2024-01-29 DIAGNOSIS — E11.9 TYPE 2 DIABETES MELLITUS WITHOUT COMPLICATION, WITH LONG-TERM CURRENT USE OF INSULIN (H): ICD-10-CM

## 2024-01-29 DIAGNOSIS — Z79.4 TYPE 2 DIABETES MELLITUS WITHOUT COMPLICATION, WITH LONG-TERM CURRENT USE OF INSULIN (H): ICD-10-CM

## 2024-01-29 NOTE — PROGRESS NOTES
Outcome for 01/29/24 1:24 PM: Data obtained via [facility] website  Alejandra Duvall MA    Patient [place] 5/5 [place].   Alejandra Duvall MA

## 2024-01-30 ENCOUNTER — PATIENT OUTREACH (OUTPATIENT)
Dept: GERIATRIC MEDICINE | Facility: CLINIC | Age: 69
End: 2024-01-30

## 2024-01-30 ENCOUNTER — ANCILLARY PROCEDURE (OUTPATIENT)
Dept: MAMMOGRAPHY | Facility: CLINIC | Age: 69
End: 2024-01-30
Attending: INTERNAL MEDICINE
Payer: COMMERCIAL

## 2024-01-30 DIAGNOSIS — Z12.31 VISIT FOR SCREENING MAMMOGRAM: ICD-10-CM

## 2024-01-30 PROCEDURE — 77063 BREAST TOMOSYNTHESIS BI: CPT | Performed by: STUDENT IN AN ORGANIZED HEALTH CARE EDUCATION/TRAINING PROGRAM

## 2024-01-30 PROCEDURE — 77067 SCR MAMMO BI INCL CAD: CPT | Performed by: STUDENT IN AN ORGANIZED HEALTH CARE EDUCATION/TRAINING PROGRAM

## 2024-01-30 NOTE — PROGRESS NOTES
[facility] Care Coordination Contact    Received after visit chart from care coordinator.  Completed following tasks: Mailed copy of care plan/support plan to member, Mailed [employer] Leave Behind Letter, and Updated services in Database  Emailed GEORGE weston to [facility] for PCA auth. Mailed list of chiropractors per CC.       Liz Hendricks  Care Management Specialist   [facility]   [phone]

## 2024-01-30 NOTE — LETTER
January 30, 2024      HOLLY FELDMAN  [address] [address]      Dear Holly:    At [facility], we re dedicated to improving your health and wellness. Enclosed is the Care Plan developed with you on 1/24/2024. Please review the Care Plan carefully.    As a reminder, during your visit we talked about:  Ways to manage your physical and mental health  Using health care to maintain and improve your health   Your preventive care needs     Remember to contact your care coordinator if you:  Are hospitalized, or plan to be hospitalized   Have a fall    Have a change in your physical or mental health  Need help finding support or services    If you have questions, or don t agree with your Care Plan, call me at [phone]. You can also call me if your needs change. TTY users, call the [place] [employer] at (136) or 1-136.930.4876 (yhkszv-ok-ldeqyx relay service).    Sincerely,      Malathi Bacon RN, PHN  [phone]  [email or web address]      [facility] ([facility]) is a health plan that contracts with both Medicare and the [place] Medical Assistance (Medicaid) program to provide benefits of both programs to enrollees. Enrollment in [facility] depends on contract renewal.    [id number] accepted    [id number] (07/2022)

## 2024-01-31 ENCOUNTER — TELEPHONE (OUTPATIENT)
Dept: AUDIOLOGY | Facility: CLINIC | Age: 69
End: 2024-01-31

## 2024-01-31 ENCOUNTER — VIRTUAL VISIT (OUTPATIENT)
Dept: ENDOCRINOLOGY | Facility: CLINIC | Age: 69
End: 2024-01-31
Payer: COMMERCIAL

## 2024-01-31 DIAGNOSIS — E11.9 TYPE 2 DIABETES MELLITUS WITHOUT COMPLICATION, WITH LONG-TERM CURRENT USE OF INSULIN (H): ICD-10-CM

## 2024-01-31 DIAGNOSIS — Z79.4 TYPE 2 DIABETES MELLITUS WITHOUT COMPLICATION, WITH LONG-TERM CURRENT USE OF INSULIN (H): ICD-10-CM

## 2024-01-31 DIAGNOSIS — E88.819 INSULIN RESISTANCE: ICD-10-CM

## 2024-01-31 PROCEDURE — 99214 OFFICE O/P EST MOD 30 MIN: CPT | Mod: 95 | Performed by: PHYSICIAN ASSISTANT

## 2024-01-31 RX ORDER — PIOGLITAZONEHYDROCHLORIDE 45 MG/1
45 TABLET ORAL DAILY
Qty: 90 TABLET | Refills: 1 | Status: SHIPPED | OUTPATIENT
Start: 2024-01-31 | End: 2024-06-20

## 2024-01-31 NOTE — LETTER
1/31/2024         RE: Holly Muir  [address] [facility] [id number]        Dear Colleague,    Thank you for referring your patient, Holly Muir, to the [facility]. Please see a copy of my visit note below.    Outcome for 01/29/24 1:24 PM: Data obtained via [employer] website  Alejandra Duvall MA    Patient is showing 5/5 MNCM met.   Alejandra Duvall MA                    Assessment/Plan :   Type 2 DM. Holly continues to struggle with post-prandial elevations. We reviewed her recent CGMS report and her blood sugars look good in the morning but often spike after meals. We reviewed the importance of diet and she understands. She is looking forward to getting food from a new pantry with more fresh veggies. We also reviewed her current medications. She was never notified that the Ozempic was ready, so she never started it. I will send in the prescription, again. When she starts the Ozempic, she is to decrease her Lantus does to 30 unit(s) every day. If she is not contacted regarding the new Ozempic prescription, she needs to call our office. We will plan on a follow-up appt in 3 mos.  Neuropathy. Holly has struggled with numbness in her feet for many years. She is interested in getting diabetic shoes. She will need to see an MD, in person, for a foot exam. I encouraged her to schedule her next follow-up visit in person.     Due to the COVID 19 pandemic this visit was a telephone/video visit in order to help prevent spread of infection in this patient and the general population. The patient gave verbal consent for the visit today. I have independently reviewed and interpreted labs, imaging as indicated.       Distant Location (provider location):  Off-site  Mode of Communication:  Video Conference via [employer]  Chart review/prep time 5   Joined the call at 1/31/2024, 8:48:00 am.  Left the call at 1/31/2024, 8:57:35 am.  You were on the call for 9 minutes 35 seconds  .  18 minutes spent on the date of the encounter doing chart review, history and exam, documentation and further activities as noted above.      Chief complaint:  Holly is a 68 year old female who returns for follow-up of Type 2 DM.    I have reviewed Care Everywhere including [facility], [facility], [facility], [facility], [facility], [facility] , [facility], [place] lab reports, imaging reports and provider notes as indicated.      HISTORY OF PRESENT ILLNESS  Holly continues to struggle with blood sugar control. She feels like her medications are working well but she has not been consistent with her diet. She states that her blood sugars are always low in the morning but as soon as she eats, the numbers shoot up. She is currently taking pioglitazone 45 mg daily, metformin 2000 mg daily, glimepiride 8 mg daily, and Lantus 30-35 unit(s) in the morning. She monitors her blood sugars with the FreeStyle Mathieu 2. She states that if her blood sugars are low in the morning, she will take 30 unit(s) of Lantus and if they are higher, she will take 35 unit(s). At our last visit, we had discussed starting Ozempic but she never received a call from the pharmacy, so it was never started.    Holly denies any recent problems with severe hyperglycemia and/or hypoglycemia. She does feel like her blood sugars are constantly up and down, though. It is not uncommon for her blood sugar to fluctuate from 65 mg/dl up to 350 mg/dl. She has not had any problems with blurred vision but she does complain that her eyes feel dry. She also has a long history of numbness and tingling in her feet. She worries about her feet and she would like a prescription for diabetic foot wear. She also has a history of microalbuminuria.    Holly was diagnosed with diabetes many years ago. She has used a combination of insulin and oral medications, for some time. She has struggled to keep her hemoglobin A1C to goal, in the past. She does understand  the importance of diet and exercise in managing diabetes. She struggles with food insecurity and she has been getting food from a food pantry that has mostly processed food items. She states that she is going to be switching to a new food pantry through the [employer] and that it has more options for fresh vegetables. Her diabetes is complicated by obesity, hyperlipidemia, CKD stage 3, GERD, schizoaffective disorder, and osteoarthritis.    Endocrine relevant labs are as follows:   Latest Reference Range & Units 12/01/23 13:01   Hemoglobin A1C 0.0 - 5.6 % 7.4 (H)   (H): Data is abnormally high   Latest Reference Range & Units 09/15/23 12:37   Albumin Urine mg/g Cr 0.00 - 25.00 mg/g Cr 64.26 (H)   (H): Data is abnormally high   Latest Reference Range & Units 09/15/23 12:37   Albumin Urine mg/L mg/L 36.5     REVIEW OF SYSTEMS    Endocrine: positive for diabetes  Skin: negative  Eyes: positive for tearing, itching, negative for, visual blurring  Ears/Nose/Throat: negative  Respiratory: No shortness of breath, dyspnea on exertion, cough, or hemoptysis  Cardiovascular: negative for, chest pain, dyspnea on exertion, lower extremity edema, and exercise intolerance  Gastrointestinal: negative for, nausea, vomiting, constipation, and diarrhea  Genitourinary: negative for, nocturia, dysuria, frequency, and urgency  Musculoskeletal: negative for, muscular weakness, nocturnal cramping, and foot pain  Neurologic: positive for numbness or tingling of feet, negative for, local weakness, and numbness or tingling of hands  Psychiatric: negative  Hematologic/Lymphatic/Immunologic: negative    Past Medical History  Past Medical History:   Diagnosis Date     Bilateral knee pain      Cataracts, both eyes 5/8/2013     Cervical cancer (H)      Chronic kidney disease, stage 3a (H) 11/17/2021     Diabetes      Diabetic retinopathy (H)      HTN      Inflammatory arthritis      Major depression      Memory loss      Nephropathy      OA  (osteoarthritis) of knee 9/11/2013     Other and unspecified hyperlipidemia      Pterygium eye      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       Medications  Current Outpatient Medications   Medication Sig Dispense Refill     pioglitazone (ACTOS) 45 MG tablet Take 1 tablet (45 mg) by mouth daily 90 tablet 1     semaglutide (OZEMPIC) 2 MG/3ML pen Inject 0.25 mg Subcutaneous every 7 days 3 mL 0     acetaminophen (TYLENOL) 325 MG tablet Take 2 tablets (650 mg) by mouth every 4 hours as needed for other (For optimal non-opioid multimodal pain management to improve pain control.) 100 tablet 0     ARIPiprazole (ABILIFY) 20 MG tablet Take 1 tablet (20 mg) by mouth every evening 30 tablet 3     aspirin (ASA) 81 MG chewable tablet Take 1 tablet (81 mg) by mouth daily 90 tablet 1     blood glucose (NO BRAND SPECIFIED) test strip Use to test blood sugar 4 times daily or as directed. accu check guide strips 360 strip 3     blood glucose monitoring (NO BRAND SPECIFIED) meter device kit Use to test blood sugar 4  times daily or as directed. Accuceck Guide meter 1 kit 1     blood glucose monitoring (ONE TOUCH ULTRA 2) meter device kit USE TO TEST BLOOD SUGAR 4 TIMES DAILY OR AS DIRECTED. ACCUCECK GUIDE METER       blood glucose monitoring (ULTRA THIN 30G) lancets Use to test blood sugar 4  times daily or as directed.lancets for acchu check guide 400 each 3     buPROPion (WELLBUTRIN SR) 200 MG 12 hr tablet Take 1 tablet (200 mg) by mouth daily (with breakfast) 30 tablet 3     busPIRone HCl (BUSPAR) 30 MG tablet TAKE 1 TABLET BY MOUTH TWICE A DAY 60 tablet 3     cetirizine (ZYRTEC) 10 MG tablet TAKE 1 TABLET (10 MG) BY MOUTH DAILY. 90 tablet 1     Continuous Blood Gluc  (FREESTYLE CM 2 READER) GALINA 1 Dose continuous 1 each 0     Continuous Blood Gluc Sensor (FREESTYLE CM 2 SENSOR) MISC Use one sensor every 14 days to read blood sugar 9 each 3     diclofenac (VOLTAREN) 1 % topical gel Apply 4 g topically 4 times  daily 350 g 3     ELIQUIS ANTICOAGULANT 5 MG tablet TAKE 1 TABLET BY MOUTH TWICE A DAY 60 tablet 2     ferrous gluconate (FERGON) 324 (38 Fe) MG tablet Take 1 tablet (324 mg) by mouth daily (with breakfast) 90 tablet 3     gabapentin (NEURONTIN) 600 MG tablet TAKE 2 TABLETS (1,200 MG) BY MOUTH 3 TIMES DAILY FOR 30 DAYS 180 tablet 5     glimepiride (AMARYL) 2 MG tablet Take 2 tablets (4 mg) by mouth 2 times daily (before meals) 360 tablet 3     insulin aspart (NOVOLOG FLEXPEN) 100 UNIT/ML pen Using as needed for high blood sugars       insulin glargine (LANTUS SOLOSTAR) 100 UNIT/ML pen Inject 35-42 units at bedtime as directed + 2 units for priming of pen. Max dose 44 units. 45 mL 0     insulin pen needle (31G X 5 MM) 31G X 5 MM miscellaneous Use four times 4 day times a day 200 each 3     metFORMIN (GLUCOPHAGE XR) 500 MG 24 hr tablet Take 2 tablets (1,000 mg) by mouth 2 times daily (with meals) Take two tablets with breakfast and one tablet with dinner for one week then increase to two tablets with breakfast and two tablets with dinner thereafter 360 tablet 3     mirtazapine (REMERON) 7.5 MG tablet Take 1 tablet (7.5 mg) by mouth at bedtime 30 tablet 3     Multiple Vitamin (DAILY-ISAAC MULTIVITAMIN) TABS TAKE 1 TABLET BY MOUTH EVERY DAY 90 tablet 0     naloxone (NARCAN) 4 MG/0.1ML nasal spray Spray 1 spray (4 mg) into one nostril alternating nostrils once as needed for opioid reversal every 2-3 minutes until assistance arrives 0.2 mL 0     nystatin (MYCOSTATIN) 800039 UNIT/GM external powder Apply 1 dose topically 3 times daily as needed 60 g 3     omeprazole (PRILOSEC) 20 MG DR capsule TAKE 2 CAPSULES (40 MG) BY MOUTH DAILY *TAKE 30-60 MINUTES BEFORE A MEAL* 180 capsule 2     PARoxetine (PAXIL) 40 MG tablet Take 1 tablet (40 mg) by mouth every evening 30 tablet 3     polyethylene glycol (MIRALAX) 17 g packet Take 17 g by mouth daily 7 packet 0     propranolol ER (INDERAL LA) 80 MG 24 hr capsule TAKE 1 CAPSULE BY  MOUTH EVERY DAY 90 capsule 3     ramipril (ALTACE) 5 MG capsule TAKE 1 CAPSULE BY MOUTH EVERY DAY 90 capsule 2     rOPINIRole (REQUIP) 0.25 MG tablet TAKE 1 TABLET (0.25 MG) BY MOUTH EVERY EVENING 90 tablet 0     simvastatin (ZOCOR) 20 MG tablet TAKE 1 TABLET BY MOUTH EVERYDAY AT BEDTIME 90 tablet 2     tiZANidine (ZANAFLEX) 2 MG tablet TAKE 1 TABLET BY MOUTH THREE TIMES A DAY AS NEEDED FOR MUSCLE SPASM 30 tablet 0     vitamin C (CVS VITAMIN C) 500 MG tablet 1/2 TABLET BY MOUTH JACOBSON WITH IRON SUPPLEMENT BEFORE MEAL 45 tablet 3     VITAMIN D3 25 MCG (1000 UT) tablet TAKE 1 TABLET BY MOUTH EVERY DAY 90 tablet 3       Allergies  Allergies   Allergen Reactions     Morphine Sulfate Itching       Family History  family history includes Cancer in her maternal grandfather and paternal grandfather; Cerebrovascular Disease in her mother; Diabetes in her brother, father, mother, and sister; Hypertension in her father; Multiple Sclerosis in her daughter; Thyroid Disease in her daughter and sister.    Social History  Social History     Tobacco Use     Smoking status: Former     Packs/day: 0     Types: Cigarettes     Start date: 1974     Quit date: 1975     Years since quittin.5     Passive exposure: Never     Smokeless tobacco: Never   Vaping Use     Vaping Use: Never used   Substance Use Topics     Alcohol use: Yes     Alcohol/week: 0.0 standard drinks of alcohol     Comment: social occasions     Drug use: No       Physical Exam  There is no height or weight on file to calculate BMI.  GENERAL: no distress  SKIN: Visible skin clear. No significant rash, abnormal pigmentation or lesions.  EYES: Eyes grossly normal to inspection.  No discharge or erythema, or obvious scleral/conjunctival abnormalities.  NECK: visible goiter is not present; no visible neck masses  RESP: No audible wheeze, cough, or visible cyanosis.  No visible retractions or increased work of breathing.    NEURO: Awake, alert, responds appropriately  to questions.  Mentation and speech fluent.  PSYCH:affect normal and appearance well-groomed.      DATA REVIEW  FreeStyle LibreView  Time in target range 49%  High 29% and Very High 22%  Current Ave  mg/dl        Again, thank you for allowing me to participate in the care of your patient.        Sincerely,        Kelly Yan PA-C

## 2024-01-31 NOTE — PROGRESS NOTES
Assessment/Plan :   Type 2 DM. Holly continues to struggle with post-prandial elevations. We reviewed her recent CGMS report and her blood sugars look good in the morning but often spike after meals. We reviewed the importance of diet and she understands. She is looking forward to getting food from a new pantry with more fresh veggies. We also reviewed her current medications. She was never notified that the Ozempic was ready, so she never started it. I will send in the prescription, again. When she starts the Ozempic, she is to decrease her Lantus does to 30 unit(s) every day. If she is not contacted regarding the new Ozempic prescription, she needs to call our office. We will plan on a follow-up appt in 3 mos.  Neuropathy. Holly has struggled with numbness in her feet for many years. She is interested in getting diabetic shoes. She will need to see an MD, in person, for a foot exam. I encouraged her to schedule her next follow-up visit in person.     Due to the COVID 19 pandemic this visit was a telephone/video visit in order to help prevent spread of infection in this patient and the general population. The patient gave verbal consent for the visit today. I have independently reviewed and interpreted labs, imaging as indicated.       Distant Location (provider location):  Off-site  Mode of Communication:  Video Conference via [employer]  Chart review/prep time 5   Joined the call at 1/31/2024, 8:48:00 am.  Left the call at 1/31/2024, 8:57:35 am.  You were on the call for 9 minutes 35 seconds .  18 minutes spent on the date of the encounter doing chart review, history and exam, documentation and further activities as noted above.      Chief complaint:  Holly is a 68 year old female who returns for follow-up of Type 2 DM.    I have reviewed Care Everywhere including [facility], [facility], [facility] [place], [facility], [facility] , [facility], [place] lab reports, imaging reports and provider  notes as indicated.      HISTORY OF PRESENT ILLNESS  Holly continues to struggle with blood sugar control. She feels like her medications are working well but she has not been consistent with her diet. She states that her blood sugars are always low in the morning but as soon as she eats, the numbers shoot up. She is currently taking pioglitazone 45 mg daily, metformin 2000 mg daily, glimepiride 8 mg daily, and Lantus 30-35 unit(s) in the morning. She monitors her blood sugars with the FreeStyle Mathieu 2. She states that if her blood sugars are low in the morning, she will take 30 unit(s) of Lantus and if they are higher, she will take 35 unit(s). At our last visit, we had discussed starting Ozempic but she never received a call from the pharmacy, so it was never started.    Holly denies any recent problems with severe hyperglycemia and/or hypoglycemia. She does feel like her blood sugars are constantly up and down, though. It is not uncommon for her blood sugar to fluctuate from 65 mg/dl up to 350 mg/dl. She has not had any problems with blurred vision but she does complain that her eyes feel dry. She also has a long history of numbness and tingling in her feet. She worries about her feet and she would like a prescription for diabetic foot wear. She also has a history of microalbuminuria.    Holly was diagnosed with diabetes many years ago. She has used a combination of insulin and oral medications, for some time. She has struggled to keep her hemoglobin A1C to goal, in the past. She does understand the importance of diet and exercise in managing diabetes. She struggles with food insecurity and she has been getting food from a food pantry that has mostly processed food items. She states that she is going to be switching to a new food pantry through the [email or web address] and that it has more options for fresh vegetables. Her diabetes is complicated by obesity, hyperlipidemia, CKD stage 3, GERD, schizoaffective  disorder, and osteoarthritis.    Endocrine relevant labs are as follows:   Latest Reference Range & Units 12/01/23 13:01   Hemoglobin A1C 0.0 - 5.6 % 7.4 (H)   (H): Data is abnormally high   Latest Reference Range & Units 09/15/23 12:37   Albumin Urine mg/g Cr 0.00 - 25.00 mg/g Cr 64.26 (H)   (H): Data is abnormally high   Latest Reference Range & Units 09/15/23 12:37   Albumin Urine mg/L mg/L 36.5     REVIEW OF SYSTEMS    Endocrine: positive for diabetes  Skin: negative  Eyes: positive for tearing, itching, negative for, visual blurring  Ears/Nose/Throat: negative  Respiratory: No shortness of breath, dyspnea on exertion, cough, or hemoptysis  Cardiovascular: negative for, chest pain, dyspnea on exertion, lower extremity edema, and exercise intolerance  Gastrointestinal: negative for, nausea, vomiting, constipation, and diarrhea  Genitourinary: negative for, nocturia, dysuria, frequency, and urgency  Musculoskeletal: negative for, muscular weakness, nocturnal cramping, and foot pain  Neurologic: positive for numbness or tingling of feet, negative for, local weakness, and numbness or tingling of hands  Psychiatric: negative  Hematologic/Lymphatic/Immunologic: negative    Past Medical History  Past Medical History:   Diagnosis Date    Bilateral knee pain     Cataracts, both eyes 5/8/2013    Cervical cancer (H)     Chronic kidney disease, stage 3a (H) 11/17/2021    Diabetes     Diabetic retinopathy (H)     HTN     Inflammatory arthritis     Major depression     Memory loss     Nephropathy     OA (osteoarthritis) of knee 9/11/2013    Other and unspecified hyperlipidemia     Pterygium eye     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       Medications  Current Outpatient Medications   Medication Sig Dispense Refill    pioglitazone (ACTOS) 45 MG tablet Take 1 tablet (45 mg) by mouth daily 90 tablet 1    semaglutide (OZEMPIC) 2 MG/3ML pen Inject 0.25 mg Subcutaneous every 7 days 3 mL 0    acetaminophen (TYLENOL) 325 MG  tablet Take 2 tablets (650 mg) by mouth every 4 hours as needed for other (For optimal non-opioid multimodal pain management to improve pain control.) 100 tablet 0    ARIPiprazole (ABILIFY) 20 MG tablet Take 1 tablet (20 mg) by mouth every evening 30 tablet 3    aspirin (ASA) 81 MG chewable tablet Take 1 tablet (81 mg) by mouth daily 90 tablet 1    blood glucose (NO BRAND SPECIFIED) test strip Use to test blood sugar 4 times daily or as directed. accu check guide strips 360 strip 3    blood glucose monitoring (NO BRAND SPECIFIED) meter device kit Use to test blood sugar 4  times daily or as directed. Accuceck Guide meter 1 kit 1    blood glucose monitoring (ONE TOUCH ULTRA 2) meter device kit USE TO TEST BLOOD SUGAR 4 TIMES DAILY OR AS DIRECTED. ACCUCECK GUIDE METER      blood glucose monitoring (ULTRA THIN 30G) lancets Use to test blood sugar 4  times daily or as directed.lancets for acchu check guide 400 each 3    buPROPion (WELLBUTRIN SR) 200 MG 12 hr tablet Take 1 tablet (200 mg) by mouth daily (with breakfast) 30 tablet 3    busPIRone HCl (BUSPAR) 30 MG tablet TAKE 1 TABLET BY MOUTH TWICE A DAY 60 tablet 3    cetirizine (ZYRTEC) 10 MG tablet TAKE 1 TABLET (10 MG) BY MOUTH DAILY. 90 tablet 1    Continuous Blood Gluc  (FREESTYLE CM 2 READER) GALINA 1 Dose continuous 1 each 0    Continuous Blood Gluc Sensor (FREESTYLE CM 2 SENSOR) MISC Use one sensor every 14 days to read blood sugar 9 each 3    diclofenac (VOLTAREN) 1 % topical gel Apply 4 g topically 4 times daily 350 g 3    ELIQUIS ANTICOAGULANT 5 MG tablet TAKE 1 TABLET BY MOUTH TWICE A DAY 60 tablet 2    ferrous gluconate (FERGON) 324 (38 Fe) MG tablet Take 1 tablet (324 mg) by mouth daily (with breakfast) 90 tablet 3    gabapentin (NEURONTIN) 600 MG tablet TAKE 2 TABLETS (1,200 MG) BY MOUTH 3 TIMES DAILY FOR 30 DAYS 180 tablet 5    glimepiride (AMARYL) 2 MG tablet Take 2 tablets (4 mg) by mouth 2 times daily (before meals) 360 tablet 3    insulin  aspart (NOVOLOG FLEXPEN) 100 UNIT/ML pen Using as needed for high blood sugars      insulin glargine (LANTUS SOLOSTAR) 100 UNIT/ML pen Inject 35-42 units at bedtime as directed + 2 units for priming of pen. Max dose 44 units. 45 mL 0    insulin pen needle (31G X 5 MM) 31G X 5 MM miscellaneous Use four times 4 day times a day 200 each 3    metFORMIN (GLUCOPHAGE XR) 500 MG 24 hr tablet Take 2 tablets (1,000 mg) by mouth 2 times daily (with meals) Take two tablets with breakfast and one tablet with dinner for one week then increase to two tablets with breakfast and two tablets with dinner thereafter 360 tablet 3    mirtazapine (REMERON) 7.5 MG tablet Take 1 tablet (7.5 mg) by mouth at bedtime 30 tablet 3    Multiple Vitamin (DAILY-ISAAC MULTIVITAMIN) TABS TAKE 1 TABLET BY MOUTH EVERY DAY 90 tablet 0    naloxone (NARCAN) 4 MG/0.1ML nasal spray Spray 1 spray (4 mg) into one nostril alternating nostrils once as needed for opioid reversal every 2-3 minutes until assistance arrives 0.2 mL 0    nystatin (MYCOSTATIN) 423500 UNIT/GM external powder Apply 1 dose topically 3 times daily as needed 60 g 3    omeprazole (PRILOSEC) 20 MG DR capsule TAKE 2 CAPSULES (40 MG) BY MOUTH DAILY *TAKE 30-60 MINUTES BEFORE A MEAL* 180 capsule 2    PARoxetine (PAXIL) 40 MG tablet Take 1 tablet (40 mg) by mouth every evening 30 tablet 3    polyethylene glycol (MIRALAX) 17 g packet Take 17 g by mouth daily 7 packet 0    propranolol ER (INDERAL LA) 80 MG 24 hr capsule TAKE 1 CAPSULE BY MOUTH EVERY DAY 90 capsule 3    ramipril (ALTACE) 5 MG capsule TAKE 1 CAPSULE BY MOUTH EVERY DAY 90 capsule 2    rOPINIRole (REQUIP) 0.25 MG tablet TAKE 1 TABLET (0.25 MG) BY MOUTH EVERY EVENING 90 tablet 0    simvastatin (ZOCOR) 20 MG tablet TAKE 1 TABLET BY MOUTH EVERYDAY AT BEDTIME 90 tablet 2    tiZANidine (ZANAFLEX) 2 MG tablet TAKE 1 TABLET BY MOUTH THREE TIMES A DAY AS NEEDED FOR MUSCLE SPASM 30 tablet 0    vitamin C (CVS VITAMIN C) 500 MG tablet 1/2 TABLET  BY MOUTH INDIRA WITH IRON SUPPLEMENT BEFORE MEAL 45 tablet 3    VITAMIN D3 25 MCG (1000 UT) tablet TAKE 1 TABLET BY MOUTH EVERY DAY 90 tablet 3       Allergies  Allergies   Allergen Reactions    Morphine Sulfate Itching       Family History  family history includes Cancer in her maternal grandfather and paternal grandfather; Cerebrovascular Disease in her mother; Diabetes in her brother, father, mother, and sister; Hypertension in her father; Multiple Sclerosis in her daughter; Thyroid Disease in her daughter and sister.    Social History  Social History     Tobacco Use    Smoking status: Former     Packs/day: 0     Types: Cigarettes     Start date: 1974     Quit date: 1975     Years since quittin.5     Passive exposure: Never    Smokeless tobacco: Never   Vaping Use    Vaping Use: Never used   Substance Use Topics    Alcohol use: Yes     Alcohol/week: 0.0 standard drinks of alcohol     Comment: social occasions    Drug use: No       Physical Exam  There is no height or weight on file to calculate BMI.  GENERAL: no distress  SKIN: Visible skin clear. No significant rash, abnormal pigmentation or lesions.  EYES: Eyes grossly normal to inspection.  No discharge or erythema, or obvious scleral/conjunctival abnormalities.  NECK: visible goiter is not present; no visible neck masses  RESP: No audible wheeze, cough, or visible cyanosis.  No visible retractions or increased work of breathing.    NEURO: Awake, alert, responds appropriately to questions.  Mentation and speech fluent.  PSYCH:affect normal and appearance well-groomed.      DATA REVIEW  FreeStyle LibreView  Time in target range 49%  High 29% and Very High 22%  Current Ave  mg/dl

## 2024-01-31 NOTE — TELEPHONE ENCOUNTER
[facility] Call Center    Phone Message    May a detailed message be left on voicemail: yes     Reason for Call: Pt is wondering if her hearing aids are back. Please call to discuss. Thank you    Action Taken: Message routed to:  [facility] ([facility]): AUDIO    Travel Screening: Not Applicable

## 2024-01-31 NOTE — NURSING NOTE
Is the patient currently in the state of [place]? YES    Visit mode:VIDEO    If the visit is dropped, the patient can be reconnected by: VIDEO VISIT: Text to cell phone:   Telephone Information:   Mobile [phone]       Will anyone else be joining the visit? NO  (If patient encounters technical issues they should call [phone]    How would you like to obtain your AVS? MyChart    Are changes needed to the allergy or medication list? No, Pt declined med review, and Pt stated no med changes    Reason for visit: RECHECK (Patient would like to talk about getting diabetic shoes.)    Daniella QUEZADA

## 2024-01-31 NOTE — TELEPHONE ENCOUNTER
Called patient to let her know that her hearing aids appear to have been lost by [employer] and we are working with Gloria to get them replaced. We will contact her when we have more information.

## 2024-02-01 DIAGNOSIS — E78.00 HIGH CHOLESTEROL: ICD-10-CM

## 2024-02-01 RX ORDER — SIMVASTATIN 20 MG
TABLET ORAL
Qty: 90 TABLET | Refills: 1 | Status: SHIPPED | OUTPATIENT
Start: 2024-02-01 | End: 2024-08-30

## 2024-02-01 RX ORDER — GLIMEPIRIDE 2 MG/1
TABLET ORAL
Qty: 90 TABLET | Refills: 3 | OUTPATIENT
Start: 2024-02-01

## 2024-02-01 NOTE — TELEPHONE ENCOUNTER
Discontinued Reorder (No AVS) Argelia Maciel, RN 2/1/23 1131  DOSE ADJUSTMENT  glimepiride (AMARYL) 2 MG tablet (Discontinued)   90 tablet 3 1/4/2023 2/1/2023 --  Sig - Route: Take 1 tablet (2 mg) by mouth every morning (before breakfast) - Oral    glimepiride (AMARYL) 2 MG tablet   360 tablet 3 7/20/2023 -- No  Sig - Route: Take 2 tablets (4 mg) by mouth 2 times daily (before meals) - Oral  Sent to pharmacy as: Glimepiride 2 MG Oral Tablet (AMARYL)

## 2024-02-05 ENCOUNTER — THERAPY VISIT (OUTPATIENT)
Dept: PHYSICAL THERAPY | Facility: CLINIC | Age: 69
End: 2024-02-05
Payer: COMMERCIAL

## 2024-02-05 DIAGNOSIS — Z96.651 S/P TOTAL KNEE ARTHROPLASTY, RIGHT: Primary | ICD-10-CM

## 2024-02-05 PROCEDURE — 97110 THERAPEUTIC EXERCISES: CPT | Mod: GP

## 2024-02-05 PROCEDURE — 97112 NEUROMUSCULAR REEDUCATION: CPT | Mod: GP

## 2024-02-05 NOTE — PROGRESS NOTES
"   02/05/24 0500   Appointment Info   Signing clinician's name / credentials Gigi Jaramlilo, PT, DPT, CSCS, CLT   Total/Authorized Visits [employer]   Visits Used 7   Medical Diagnosis Primary Osteoartritis of the Right Knee   PT Tx Diagnosis S/p Right TKA   Quick Adds Certification   Progress Note/Certification   Start of Care Date 11/03/23   Onset of illness/injury or Date of Surgery 10/24/23   Therapy Frequency 1x a week   Predicted Duration 7 weeks   Certification date from 01/31/24   Certification date to 03/18/24   Progress Note Due Date 03/18/24   Progress Note Completed Date 02/05/24   GOALS   PT Goals 2   PT Goal 1   Goal Identifier Ambulation   Goal Description Will be able to ambulate around home and around her communicate without any assitive device for at least 1000 feet and no pain worse than 2/10 in the knee   Rationale to maximize safety and independence with performance of ADLs and functional tasks;to maximize safety and independence within the home;to maximize safety and independence within the community;to maximize safety and independence with self cares;to maximize safety and independence with transportation   Goal Progress Arrives with AD today. After analysis of her gait, it shows she can ambulate without an AD without concern due to her right knee pain, but rather requires a SEC for her general balance   Target Date 11/17/23   Date Met 12/04/23   PT Goal 2   Goal Identifier Stairs   Goal Description Patient will be able to ascend and descend two flights of stairs with no UE support and no instability or pain due to her right knee   Rationale to maximize safety and independence with performance of ADLs and functional tasks;to maximize safety and independence within the home;to maximize safety and independence within the community;to maximize safety and independence with transportation;to maximize safety and independence with self cares   Goal Progress Improved and can now ascend 6 and 7\" steps with " "rail. Still needs eccentric decent work so has not accomplished goal yet. Goal extended   Target Date 03/18/24   Subjective Report   Subjective Report She says her walking is getting better and she is having a better job getting out of chairs.   Objective Measures   Objective Measures Objective Measure 1;Objective Measure 2;Objective Measure 3;Objective Measure 4;Objective Measure 5;Objective Measure 6   Objective Measure 1   Objective Measure LAQ   Details Has finally accomplished the seated AROM and strength to actively kick her leg to full extension   Objective Measure 2   Objective Measure AROM   Details 0-110   Objective Measure 3   Objective Measure PROM   Details 0-118   Objective Measure 4   Objective Measure Stairs   Details Can ascend a 7\" step for 10 reps with single rail use   Objective Measure 5   Objective Measure Gait   Details Needs SEC, not because of strength or stability of right knee but rather for general balance   Objective Measure 6   Objective Measure Balance Assessment   Details 30 second sit to stand test: 9, TUG: 10.6 seconds,  Single Leg Stance: 1 seconds (Struggles), Narrow Base of Support: 1+ minute, Tandem Stance: 13 seconds   Treatment Interventions (PT)   Interventions Therapeutic Procedure/Exercise;Neuromuscular Re-education;Gait Training   Therapeutic Procedure/Exercise   Therapeutic Procedures: strength, endurance, ROM, flexibillity minutes (40662) 45   Ther Proc 1 Bike   Ther Proc 1 - Details x8 minutes for warm up   PTRx Ther Proc 1 Heel Slides   PTRx Ther Proc 1 - Details 1x5 (PROM 0 - 118)  1x5 (AROM 0 - 110)   PTRx Ther Proc 2 Hip Flexion Straight Leg Raise   PTRx Ther Proc 2 - Details 1x20 with extensor lag (3-5 degrees) that worsens at rep 12 and later (5-15 degrees extensor lag).   PTRx Ther Proc 3 Bridging #1   PTRx Ther Proc 3 - Details 1x20 (weak and fatiguing but getting higher)   PTRx Ther Proc 4 Standing Hip Flexion   PTRx Ther Proc 4 - Details 1x20 each leg " "(Struggled without UE support so needed UE support)   PTRx Ther Proc 5 Seated Knee Extension With Theraband   PTRx Ther Proc 5 - Details Seated Knee Extension with 2 lb ankle weight: 3x30 (finally achevied terminal knee extension with LAQ)   PTRx Ther Proc 6 Step Up   PTRx Ther Proc 6 - Details Following Leg Press Squats Step Ups  6\" box 1x10 (Dramatically improved and performed well still needed cues for terminal knee extension but otherwise did well).  Then 2x10 7\" step (Much more difficult and her level)   PTRx Ther Proc 7 Sit to Stand   PTRx Ther Proc 7 - Details Leg Press Squats first: 3x20 with no weight added   Skilled Intervention Functional and Progressed Strengthening   Patient Response/Progress Improved nicely and can now actively extend to end range extension with LAQ   Neuromuscular Re-education   Neuromuscular re-ed of mvmt, balance, coord, kinesthetic sense, posture, proprioception minutes (52811) 8   PTRx Neuro Re-ed 1 Single Leg Stance - Balance Right Foot   PTRx Neuro Re-ed 1 - Details 3x1 seconds UNASSISTED (PT guarding closely).   PTRx Neuro Re-ed 2 Tandem Stance Static Without Support   PTRx Neuro Re-ed 2 - Details 3 attempts with best attempt being 13 seconds. Has consistent struggled with this and we tell her not to do this at home to avoid fall risk   PTRx Neuro Re-ed 3 Romberg Eyes Open   PTRx Neuro Re-ed 3 - Details 1x75 seconds   Skilled Intervention Balance assessment and Balance training   Patient Response/Progress Still quite imbalanced, but progressing nicely. Overall her balance has improved, but is still impaired enough to recommend an SEC.   Neuro Re-ed 1 30 second sit to stand test and TUG test   Neuro Re-ed 1 - Details 30 second sit to stand: 9 reps.  TUG test: 10.6 seconds   Plan   Home program See PTRx   Updates to plan of care Continue per POC   Plan for next session Continue functional training work, working hamstring to improve AROM Knee Flexion, Quad work, Step ups, and add " eccentric step downs next. End with general balance work   Total Session Time   Timed Code Treatment Minutes 53   Total Treatment Time (sum of timed and untimed services) 53         [facility]                                                                                   OUTPATIENT PHYSICAL THERAPY    PLAN OF TREATMENT FOR OUTPATIENT REHABILITATION   Patient's Last Name, First Name, Holly Rahman YOB: 1955   Provider's Name   [facility]   Medical Record No.  [id number]     Onset Date: 10/24/23  Start of Care Date: 11/03/23     Medical Diagnosis:  Primary Osteoartritis of the Right Knee      PT Treatment Diagnosis:  S/p Right TKA Plan of Treatment  Frequency/Duration: 1x a week/ 7 weeks    Certification date from 01/31/24 to 03/18/24         See note for plan of treatment details and functional goals     Gigi Jaramillo, PT                         I CERTIFY THE NEED FOR THESE SERVICES FURNISHED UNDER        THIS PLAN OF TREATMENT AND WHILE UNDER MY CARE     (Physician attestation of this document indicates review and certification of the therapy plan).              Referring Provider:  Daniella Anton    Initial Assessment  See Epic Evaluation- Start of Care Date: 11/03/23

## 2024-02-06 NOTE — PROGRESS NOTES
Medication Therapy Management (MTM) Encounter    ASSESSMENT:                            Medication Adherence/Access: See below for considerations    Diabetes:   Patient is not meeting A1c goal of < 7%.  Patient would benefit from starting Ozempic. Called the pharmacy and they have the Ozempic was picked up on 2/2.     Osteoarthritis/chronic pain/Neuropathy: Stable    RLS:   Stable    GERD:   Stable.    Hypertension:   Stable.    Hyperlipidemia:   Stable.    PLAN:                          Patient to follow up with son in law to see if he picked up the Ozempic on 2/2. Patient will call back with outcome    Follow-up: 4 weeks    SUBJECTIVE/OBJECTIVE:                          Holly Muir is a 68 year old female called for a follow-up visit.  Today's visit is a follow-up MTM visit from 11/6/2023.     Reason for visit:  blood sugars. Ozempic titration    Allergies/ADRs: Reviewed in chart  Past Medical History: Reviewed in chart  Tobacco: She reports that she quit smoking about 47 years ago. Her smoking use included cigarettes. She started smoking about 48 years ago. She has never used smokeless tobacco.  Alcohol: not currently using  Lives between two daughters.     Medication Adherence/Access: daughters help manage medications. Uses a pill box. Patient is not very familiar with her medications.  Per patient, daughters hand her her medications to her.  Has timer on her phone to remind her when to take her insulin.  The patient fills medications at [place]: NO, fills medications at [facility]    Diabetes   Type 2 Diabetes:    Lantus 30 units daily  Novolog as needed-has not been taking  Ozempic 0.25mg weekly-hasn't been able to  from pharmacy because they are waiting to get it in stock  Actos 45mg daily  Glimepiride 4mg twice daily  Metformin XR 1000mg twice daily  Not taking aspirin due to Apixiban    Patient is not experiencing side effects.  Blood sugar monitoring: Continuous Glucose  Monitor-reports blood sugars have been running high after she eats. Reports running low in the morning and mid afternoon,  Do not have access to patient's [employer]  Current diabetes symptoms: has been having some low blood sugars ~68mg/dL  Diet/Exercise: Breakfast-boiled eggs, French toast with sugar free syrup  Doesn't always eat lunch or if she does will have meat (shredded or ground beef)  Supper-hamburger meat, mixed vegetables, Burger Kevin  Snacking- yogurt  Has been exercising her legs daily. Physical therapy once a week.  Drinks water, rarely has a soda     Eye exam in the last 12 months? No  Foot exam: due  Urine Albumin:   Lab Results   Component Value Date    UMALCR 64.26 (H) 09/15/2023      Lab Results   Component Value Date    A1C 7.4 (H) 12/01/2023     Osteoarthritis/chronic pain/Neuropathy:    Tizanidine 2mg three times daily as needed-will take at bedtime as needed  Acetaminophen 650mg every 8 hours (max 4g per day)-taking about twice daily and this is helpful  Diclofenac gel as needed  Gabapentin 1200mg three times daily    Pain hasn't been too bad. Is going to have an xray on her left knee to see if she is ready to have surgery. Will be getting an epidural for her back 2/16.   Denies side effects    Depression/OCD/Schizoeffective Disorder:  Buspirone 30mg twice daily   Paroxetine 40mg in the evening  Bupropion SR 200mg   Mirtazapine 7.5mg at bedtime  Aripiprazole 20mg in the evening.    Is following with Stacey Serrano. Anxiety has been better. Sleep has been better since discontinuing trazodone and starting mirtazapine. Falls asleep ok and ususally can stay asleep. Will sometimes wake up to use the bathroom but will fall back to sleep.  Today's Vitals: There were no vitals taken for this visit. Has had a few visual hallucinations.    RLS:  Ferrous Sulfate 324mg daily  Ropinirole 0.25mg at night    Legs haven't been as jumping    GERD:   Omeprazole 40 mg once daily     Feels this is  effective.    Hypertension/Tremor:   Ramipril 5mg daily  Propranolol ER 80mg daily  Patient reports no current medication side effects.  Patient does not self-monitor blood pressure.    BP Readings from Last 3 Encounters:   12/01/23 124/64   10/03/23 108/54   09/15/23 114/73     Pulse Readings from Last 3 Encounters:   12/01/23 80   10/03/23 70   09/15/23 69     Hyperlipidemia:   simvastatin 20mg daily  Patient reports no current medication side effects.    The ASCVD Risk score (Crow CARRILLO, et al., 2019) failed to calculate for the following reasons:    The valid total cholesterol range is 130 to 320 mg/dL  Recent Labs   Lab Test 07/14/23  0944 01/19/23  1115   CHOL 98 84   HDL 57 51   LDL 23 16   TRIG 91 83     ----------------  I spent 26 minutes with this patient today. All changes were made via collaborative practice agreement with Dr. King. A copy of the visit note was provided to the patient's provider(s).    A summary of these recommendations was sent via [employer].    Telemedicine Visit Details  Type of service:  Telephone visit  Start Time: 9:01AM  End Time: 9:27AM     Medication Therapy Recommendations  No medication therapy recommendations to display

## 2024-02-07 ENCOUNTER — VIRTUAL VISIT (OUTPATIENT)
Dept: PHARMACY | Facility: CLINIC | Age: 69
End: 2024-02-07
Payer: COMMERCIAL

## 2024-02-07 DIAGNOSIS — Z79.4 TYPE 2 DIABETES MELLITUS WITHOUT COMPLICATION, WITH LONG-TERM CURRENT USE OF INSULIN (H): ICD-10-CM

## 2024-02-07 DIAGNOSIS — E11.9 TYPE 2 DIABETES MELLITUS WITHOUT COMPLICATION, WITH LONG-TERM CURRENT USE OF INSULIN (H): ICD-10-CM

## 2024-02-07 PROCEDURE — 99605 MTMS BY PHARM NP 15 MIN: CPT | Mod: 93 | Performed by: PHARMACIST

## 2024-02-07 NOTE — LETTER
_  Medication List        Prepared on: 02/08/2024     Bring your Medication List when you go to the doctor, hospital, or   emergency room. And, share it with your family or caregivers.     Note any changes to how you take your medications.  Cross out medications when you no longer use them.    Medication How I take it Why I use it Prescriber   acetaminophen (TYLENOL) 325 MG tablet Take 2 tablets (650 mg) by mouth every 4 hours as needed for other (For optimal non-opioid multimodal pain management to improve pain control.) Status post total right knee replacement Kelly Chau PA-C   ARIPiprazole (ABILIFY) 20 MG tablet Take 1 tablet (20 mg) by mouth every evening Schizoaffective Disorder, Depressive Type (H) Stacey Serrano NP   aspirin (ASA) 81 MG chewable tablet Take 1 tablet (81 mg) by mouth daily Type 2 diabetes mellitus with hyperglycemia, with long-term current use of insulin (H) Tiffany King MD PhD   buPROPion (WELLBUTRIN SR) 200 MG 12 hr tablet Take 1 tablet (200 mg) by mouth daily (with breakfast) Schizoaffective Disorder, Depressive Type (H) Stacey Serrano NP   busPIRone HCl (BUSPAR) 30 MG tablet TAKE 1 TABLET BY MOUTH TWICE A DAY ISABELA (Generalized Anxiety Disorder) Stacey Serrano NP   diclofenac (VOLTAREN) 1 % topical gel Apply 4 g topically 4 times daily Primary osteoarthritis of right knee Tiffany King MD PhD   ferrous gluconate (FERGON) 324 (38 Fe) MG tablet Take 1 tablet (324 mg) by mouth daily (with breakfast) Iron deficiency anemia, unspecified iron deficiency anemia type Tiffany King MD PhD   gabapentin (NEURONTIN) 600 MG tablet TAKE 2 TABLETS (1,200 MG) BY MOUTH 3 TIMES DAILY FOR 30 DAYS Arthralgia of both knees; Closed fracture of fifth metatarsal bone of left foot, physeal involvement unspecified, initial encounter; Restless Leg Syndrome Tiffany King MD PhD   glimepiride (AMARYL) 2 MG tablet Take 2 tablets (4 mg) by mouth 2 times daily (before meals) Type 2 diabetes mellitus  without complication, with long-term current use of insulin (H) Elizabeth Medrano MD   insulin aspart (NOVOLOG FLEXPEN) 100 UNIT/ML pen Using as needed for high blood sugars  Diabetes Patient Reported   insulin glargine (LANTUS SOLOSTAR) 100 UNIT/ML pen Inject 35-42 units at bedtime as directed + 2 units for priming of pen. Max dose 44 units. Type 2 diabetes mellitus with diabetic neuropathy (H) Elizabeth Medrano MD   metFORMIN (GLUCOPHAGE XR) 500 MG 24 hr tablet Take 2 tablets (1,000 mg) by mouth 2 times daily (with meals) Take two tablets with breakfast and one tablet with dinner for one week then increase to two tablets with breakfast and two tablets with dinner thereafter Type 2 diabetes mellitus with hyperglycemia, with long-term current use of insulin (H) Elizabeth Medrano MD   mirtazapine (REMERON) 7.5 MG tablet Take 1 tablet (7.5 mg) by mouth at bedtime Schizoaffective Disorder, Depressive Type (H) Stacey Serrano, KELLEE   Multiple Vitamin (DAILY-ISAAC MULTIVITAMIN) TABS TAKE 1 TABLET BY MOUTH EVERY DAY Severe Major Depression with Psychotic Features (H) Tiffany King MD PhD   naloxone (NARCAN) 4 MG/0.1ML nasal spray Spray 1 spray (4 mg) into one nostril alternating nostrils once as needed for opioid reversal every 2-3 minutes until assistance arrives Chronic Pain Syndrome; Continuous Opioid Dependence (H) Tiffany King MD PhD   nystatin (MYCOSTATIN) 170845 UNIT/GM external powder Apply 1 dose topically 3 times daily as needed Skin Rash Tiffany King MD PhD   omeprazole (PRILOSEC) 20 MG DR capsule TAKE 2 CAPSULES (40 MG) BY MOUTH DAILY *TAKE 30-60 MINUTES BEFORE A MEAL* Gastroesophageal Reflux Disease without Esophagitis Tiffany King MD PhD   PARoxetine (PAXIL) 40 MG tablet Take 1 tablet (40 mg) by mouth every evening ISABELA (Generalized Anxiety Disorder) Stacey Serrano, NP   pioglitazone (ACTOS) 45 MG tablet Take 1 tablet (45 mg) by mouth daily Insulin Resistance Kelly Yan PA-C   propranolol ER (INDERAL LA) 80 MG 24 hr  capsule TAKE 1 CAPSULE BY MOUTH EVERY DAY Tremor Tiffany King MD PhD   ramipril (ALTACE) 5 MG capsule TAKE 1 CAPSULE BY MOUTH EVERY DAY HTN, goal below 140/90 Tiffany King MD PhD   rOPINIRole (REQUIP) 0.25 MG tablet TAKE 1 TABLET (0.25 MG) BY MOUTH EVERY EVENING Restless Leg Syndrome Tiffany King MD PhD   semaglutide (OZEMPIC) 2 MG/3ML pen Inject 0.25 mg Subcutaneous every 7 days Type 2 diabetes mellitus without complication, with long-term current use of insulin (H) Kelly Yan PA-C   simvastatin (ZOCOR) 20 MG tablet TAKE 1 TABLET BY MOUTH EVERYDAY AT BEDTIME High Cholesterol Wayne Karimi MD   tiZANidine (ZANAFLEX) 2 MG tablet TAKE 1 TABLET BY MOUTH THREE TIMES A DAY AS NEEDED FOR MUSCLE SPASM Primary osteoarthritis of right knee; Chronic pain of right knee Tiffany King MD PhD   vitamin C ([employer] VITAMIN C) 500 MG tablet 1/2 TABLET BY MOUTH JACOBSON WITH IRON SUPPLEMENT BEFORE MEAL Iron deficiency anemia, unspecified iron deficiency anemia type Tiffany King MD PhD   VITAMIN D3 25 MCG (1000 UT) tablet TAKE 1 TABLET BY MOUTH EVERY DAY Severe recurrent major depressive disorder with psychotic features (H) Tiffany King MD PhD         Add new medications, over-the-counter drugs, herbals, vitamins, or  minerals in the blank rows below.    Medication How I take it Why I use it Prescriber                                      Allergies:      morphine sulfate        Side effects I have had:               Other Information:              My notes and questions:

## 2024-02-07 NOTE — PATIENT INSTRUCTIONS
"Recommendations from today's MTM visit:                                                         Follow up with son in law to see if he picked up the Ozempic on 2/2. Patient will call back with outcome    Follow-up: 4 weeks    It was great speaking with you today.  I value your experience and would be very thankful for your time in providing feedback in our clinic survey. In the next few days, you may receive an email or text message from [facility] [employer] with a link to a survey related to your  clinical pharmacist.\"     To schedule another MTM appointment, please call the clinic directly or you may call the MTM scheduling line at [phone] or toll-free at [phone].     My Clinical Pharmacist's contact information:                                                      Please feel free to contact me with any questions or concerns you have.      Liz Calero, Pharm.D, [facility]  Medication Therapy Management Pharmacist      "

## 2024-02-07 NOTE — LETTER
"Recommended To-Do List      Prepared on: 02/08/2024       You can get the best results from your medications by completing the items on this \"To-Do List.\"      Bring your To-Do List when you go to your doctor. And, share it with your family or caregivers.    My To-Do List:  What we talked about: What I should do:    What my medicines are for, how to know if my medicines are working, made sure my medicines are safe for me and reviewed how to take my medicines.      Take my medicines every day                 "

## 2024-02-07 NOTE — LETTER
February 8, 2024  Holly Muir  [address] [address]    Dear Ms. Muir, [facility]     Thank you for talking with me on Feb 7, 2024 about your health and medications. As a follow-up to our conversation, I have included two documents:      Your Recommended To-Do List has steps you should take to get the best results from your medications.  Your Medication List will help you keep track of your medications and how to take them.    If you want to talk about these documents, please call Liz Calero RPH at phone: [phone], Monday-Friday 8-4:30pm.    I look forward to working with you and your doctors to make sure your medications work well for you.    Sincerely,  Liz Calero RPH  [facility] Pharmacist, [facility]

## 2024-02-12 NOTE — TELEPHONE ENCOUNTER
Patient called to review dose of Ozempic she is supposed to take. Reviewed dose, administration, side effects. Endocrinology also had recommended that she take 30 units of Lantus when she starts Ozempic which patient is already taking.     Patient is scheduled for follow up appointment on 3/6.    Liz Calero, Pharm.D, [facility]  Medication Therapy Management Pharmacist

## 2024-02-16 ENCOUNTER — ANCILLARY PROCEDURE (OUTPATIENT)
Dept: GENERAL RADIOLOGY | Facility: CLINIC | Age: 69
End: 2024-02-16
Attending: FAMILY MEDICINE
Payer: COMMERCIAL

## 2024-02-16 DIAGNOSIS — M54.16 LUMBAR RADICULOPATHY: ICD-10-CM

## 2024-02-16 PROCEDURE — 62323 NJX INTERLAMINAR LMBR/SAC: CPT | Performed by: RADIOLOGY

## 2024-02-16 RX ORDER — IOPAMIDOL 408 MG/ML
10 INJECTION, SOLUTION INTRATHECAL ONCE
Status: COMPLETED | OUTPATIENT
Start: 2024-02-16 | End: 2024-02-16

## 2024-02-16 RX ORDER — BUPIVACAINE HYDROCHLORIDE 5 MG/ML
5 INJECTION, SOLUTION EPIDURAL; INTRACAUDAL ONCE
Status: COMPLETED | OUTPATIENT
Start: 2024-02-16 | End: 2024-02-16

## 2024-02-16 RX ORDER — METHYLPREDNISOLONE ACETATE 80 MG/ML
80 INJECTION, SUSPENSION INTRA-ARTICULAR; INTRALESIONAL; INTRAMUSCULAR; SOFT TISSUE ONCE
Status: COMPLETED | OUTPATIENT
Start: 2024-02-16 | End: 2024-02-16

## 2024-02-16 RX ADMIN — IOPAMIDOL 2 ML: 408 INJECTION, SOLUTION INTRATHECAL at 09:40

## 2024-02-16 RX ADMIN — BUPIVACAINE HYDROCHLORIDE 10 MG: 5 INJECTION, SOLUTION EPIDURAL; INTRACAUDAL at 09:40

## 2024-02-16 RX ADMIN — METHYLPREDNISOLONE ACETATE 80 MG: 80 INJECTION, SUSPENSION INTRA-ARTICULAR; INTRALESIONAL; INTRAMUSCULAR; SOFT TISSUE at 09:40

## 2024-02-16 NOTE — TELEPHONE ENCOUNTER
Called patient to let her know that her hearing aids have shipped but have not reached us yet. We will call her as soon as they are ready.

## 2024-02-19 ENCOUNTER — TELEPHONE (OUTPATIENT)
Dept: AUDIOLOGY | Facility: CLINIC | Age: 69
End: 2024-02-19
Payer: COMMERCIAL

## 2024-02-19 NOTE — TELEPHONE ENCOUNTER
Let patient know that her hearing aids ready. She will pick them up at [facility] in [place] and let us know if she has any questions or concerns.

## 2024-03-05 NOTE — PROGRESS NOTES
Medication Therapy Management (MTM) Encounter    ASSESSMENT:                            Medication Adherence/Access: See below for considerations    Diabetes:   Patient is not meeting A1c goal of < 7%.  Patient would benefit from starting Ozempic. Called the pharmacy and they have the Ozempic was picked up on 2/2.     PLAN:                          Increase Ozempic to 0.5mg weekly    Follow-up: 4 weeks    SUBJECTIVE/OBJECTIVE:                          Holly Muir is a 68 year old female called for a follow-up visit.  Today's visit is a follow-up MTM visit from 2/7/2024.     Reason for visit:  blood sugars. Ozempic titration    Allergies/ADRs: Reviewed in chart  Past Medical History: Reviewed in chart  Tobacco: She reports that she quit smoking about 47 years ago. Her smoking use included cigarettes. She started smoking about 48 years ago. She has never used smokeless tobacco.  Alcohol: not currently using  Lives between two daughters.     Medication Adherence/Access: daughters help manage medications. Uses a pill box. Patient is not very familiar with her medications.  Per patient, daughters hand her her medications to her.  Has timer on her phone to remind her when to take her insulin.  The patient fills medications at [facility]: NO, fills medications at [facility]    Diabetes   Type 2 Diabetes:    Lantus 30 units daily  Novolog as needed-has not been taking  Ozempic 0.25mg weekly Mondays (has taken 5 doses)  Actos 45mg daily  Glimepiride 4mg twice daily  Metformin XR 1000mg twice daily  Not taking aspirin due to Apixiban    Patient is not experiencing side effects.  Blood sugar monitoring: Continuous Glucose Monitor-has been having issues with the luke. Had to put the luke on her daughter's phone. Has been using  her back up meter. Feels like her blood sugars are getting better with the Ozempic, Isn't seeing blood sugars in the 300's or 400's.   Date FBG/ 2hours post Lunch/2hours post Dinner  /2hours post    3/6 170      3/5 150  125    3/4 154  240 (overate)    3/3 130        Current diabetes symptoms: None  Diet/Exercise: Breakfast-boiled eggs, Venezuelan toast with sugar free syrup  Doesn't always eat lunch or if she does will have meat (shredded or ground beef)  Supper-hamburger meat, mixed vegetables, Burger Kevin  Snacking- yogurt  Has been exercising her legs daily. Physical therapy once a week.  Drinks water, rarely has a soda  Follows with endocrinology  Feels like she has been more hungry since starting the Ozempic and is wanting to snack more.      Eye exam in the last 12 months? No  Foot exam: due  Urine Albumin:   Lab Results   Component Value Date    UMALCR 64.26 (H) 09/15/2023      Lab Results   Component Value Date    A1C 7.4 (H) 12/01/2023       ----------------  I spent 14 minutes with this patient today. All changes were made via collaborative practice agreement with Dr. King. A copy of the visit note was provided to the patient's provider(s).    A summary of these recommendations was sent via [employer].    Telemedicine Visit Details  Type of service:  Telephone visit  Start Time: 11:32AM  End Time: 11:46AM     Medication Therapy Recommendations   Current Medication: semaglutide (OZEMPIC) 2 MG/3ML pen   Rationale: Dose too low - Dosage too low - Effectiveness   Recommendation: Increase Dose   Status: Accepted per CPA

## 2024-03-06 ENCOUNTER — VIRTUAL VISIT (OUTPATIENT)
Dept: PHARMACY | Facility: CLINIC | Age: 69
End: 2024-03-06
Payer: COMMERCIAL

## 2024-03-06 DIAGNOSIS — Z79.4 TYPE 2 DIABETES MELLITUS WITHOUT COMPLICATION, WITH LONG-TERM CURRENT USE OF INSULIN (H): Primary | ICD-10-CM

## 2024-03-06 DIAGNOSIS — E11.9 TYPE 2 DIABETES MELLITUS WITHOUT COMPLICATION, WITH LONG-TERM CURRENT USE OF INSULIN (H): Primary | ICD-10-CM

## 2024-03-06 PROCEDURE — 99607 MTMS BY PHARM ADDL 15 MIN: CPT | Mod: 93 | Performed by: PHARMACIST

## 2024-03-06 PROCEDURE — 99606 MTMS BY PHARM EST 15 MIN: CPT | Mod: 93 | Performed by: PHARMACIST

## 2024-03-06 NOTE — PATIENT INSTRUCTIONS
"Recommendations from today's MTM visit:                                                         Increase Ozempic to 0.5mg weekly    Follow-up: 1 month    It was great speaking with you today.  I value your experience and would be very thankful for your time in providing feedback in our clinic survey. In the next few days, you may receive an email or text message from [facility] [employer] with a link to a survey related to your  clinical pharmacist.\"     To schedule another MTM appointment, please call the clinic directly or you may call the MTM scheduling line at [phone] or toll-free at [phone].     My Clinical Pharmacist's contact information:                                                      Please feel free to contact me with any questions or concerns you have.      Liz Calero, Pharm.D, [facility]  Medication Therapy Management Pharmacist      "

## 2024-03-13 ENCOUNTER — THERAPY VISIT (OUTPATIENT)
Dept: PHYSICAL THERAPY | Facility: CLINIC | Age: 69
End: 2024-03-13
Payer: COMMERCIAL

## 2024-03-13 DIAGNOSIS — Z96.651 S/P TOTAL KNEE ARTHROPLASTY, RIGHT: Primary | ICD-10-CM

## 2024-03-13 PROCEDURE — 97110 THERAPEUTIC EXERCISES: CPT | Mod: GP

## 2024-03-13 PROCEDURE — 97112 NEUROMUSCULAR REEDUCATION: CPT | Mod: GP

## 2024-03-21 ENCOUNTER — OFFICE VISIT (OUTPATIENT)
Dept: ORTHOPEDICS | Facility: CLINIC | Age: 69
End: 2024-03-21
Payer: COMMERCIAL

## 2024-03-21 ENCOUNTER — ANCILLARY PROCEDURE (OUTPATIENT)
Dept: GENERAL RADIOLOGY | Facility: CLINIC | Age: 69
End: 2024-03-21
Attending: FAMILY MEDICINE
Payer: COMMERCIAL

## 2024-03-21 DIAGNOSIS — M17.12 PRIMARY OSTEOARTHRITIS OF LEFT KNEE: ICD-10-CM

## 2024-03-21 DIAGNOSIS — M17.12 PRIMARY OSTEOARTHRITIS OF LEFT KNEE: Primary | ICD-10-CM

## 2024-03-21 DIAGNOSIS — M54.16 LUMBAR RADICULOPATHY: ICD-10-CM

## 2024-03-21 PROCEDURE — 99214 OFFICE O/P EST MOD 30 MIN: CPT | Performed by: FAMILY MEDICINE

## 2024-03-21 PROCEDURE — 73564 X-RAY EXAM KNEE 4 OR MORE: CPT | Mod: LT | Performed by: RADIOLOGY

## 2024-03-21 RX ORDER — HYDROCODONE BITARTRATE AND ACETAMINOPHEN 5; 325 MG/1; MG/1
1 TABLET ORAL EVERY 6 HOURS PRN
Qty: 10 TABLET | Refills: 0 | Status: SHIPPED | OUTPATIENT
Start: 2024-03-21 | End: 2024-03-24

## 2024-03-21 ASSESSMENT — PAIN SCALES - GENERAL: PAINLEVEL: MILD PAIN (2)

## 2024-03-21 NOTE — LETTER
3/21/2024         RE: Holly Muir  [id number] [id number]        Dear Colleague,    Thank you for referring your patient, Holly Muir, to the [facility] [place]. Please see a copy of my visit note below.    HISTORY OF PRESENT ILLNESS  Ms. Muir is a pleasant 69 year old female following up with left knee pain.  Holly last saw me in January of this year, I injected her knee with corticosteroid at that visit.  Unfortunately this only helped her for a number of weeks, her pain has been worsening over the past few weeks.  She also had an L4 5 injection that was done about 4 weeks ago.  This helped only somewhat, she continues to have fairly severe pain in her back that radiates down her hips.     PHYSICAL EXAM  General  - normal appearance, in no obvious distress  Musculoskeletal - left knee  - stance: mildly antalgic gait, assisted by cane  - inspection: trace effusion  - palpation: lateral joint line tenderness  - special tests:  (-) varus at 0 and 30 degrees flexion  (-) valgus at 0 and 30 degrees flexion  Neuro  - no sensory or motor deficit, grossly normal coordination, normal muscle tone       ASSESSMENT & PLAN  Ms. Muir is a 69 year old female following up with left knee osteoarthritis.    I ordered & independently reviewed an xray of her knee, this reveals advancing osteoarthritis.    Holly and I revisited our discussion centering around the spectrum of treatment options for osteoarthritis that preclude surgery.  She is very familiar with this given her previous treatments.    She is interested in visiting with Dr. Sheehan regarding this, which I do think is a reasonable next step.  We can help facilitate an appointment for her to be seen at her earliest convenience.    Referral lumbar spine I am ordering a repeat back injection, we can help schedule this for her.  We should get in touch with me in the following weeks if this is not  helpful.    I am also refilling a pain medication on a short-term basis for her.    It was a pleasure seeing Holly.        Santy Harrington DO, CAQSM      This note was constructed using Dragon dictation software, please excuse any minor errors in spelling, grammar, or syntax.      Again, thank you for allowing me to participate in the care of your patient.        Sincerely,        Santy Harrington DO

## 2024-03-21 NOTE — NURSING NOTE
Chief Complaint   Patient presents with    Left Knee - Pain, Follow Up       There were no vitals filed for this visit.    There is no height or weight on file to calculate BMI.      LYNDSAY Maya NREMT

## 2024-03-21 NOTE — PROGRESS NOTES
HISTORY OF PRESENT ILLNESS  Ms. Muir is a pleasant 69 year old female following up with left knee pain.  Holly last saw me in January of this year, I injected her knee with corticosteroid at that visit.  Unfortunately this only helped her for a number of weeks, her pain has been worsening over the past few weeks.  She also had an L4 5 injection that was done about 4 weeks ago.  This helped only somewhat, she continues to have fairly severe pain in her back that radiates down her hips.     PHYSICAL EXAM  General  - normal appearance, in no obvious distress  Musculoskeletal - left knee  - stance: mildly antalgic gait, assisted by cane  - inspection: trace effusion  - palpation: lateral joint line tenderness  - special tests:  (-) varus at 0 and 30 degrees flexion  (-) valgus at 0 and 30 degrees flexion  Neuro  - no sensory or motor deficit, grossly normal coordination, normal muscle tone       ASSESSMENT & PLAN  Ms. Muir is a 69 year old female following up with left knee osteoarthritis.    I ordered & independently reviewed an xray of her knee, this reveals advancing osteoarthritis.    Holly and I revisited our discussion centering around the spectrum of treatment options for osteoarthritis that preclude surgery.  She is very familiar with this given her previous treatments.    She is interested in visiting with Dr. Sheehan regarding this, which I do think is a reasonable next step.  We can help facilitate an appointment for her to be seen at her earliest convenience.    Referral lumbar spine I am ordering a repeat back injection, we can help schedule this for her.  We should get in touch with me in the following weeks if this is not helpful.    I am also refilling a pain medication on a short-term basis for her.    It was a pleasure seeing Holly.        Santy Harrington DO, CAQSM      This note was constructed using Dragon dictation software, please excuse any minor errors in spelling, grammar, or syntax.

## 2024-03-25 DIAGNOSIS — Z79.4 TYPE 2 DIABETES MELLITUS WITHOUT COMPLICATION, WITH LONG-TERM CURRENT USE OF INSULIN (H): ICD-10-CM

## 2024-03-25 DIAGNOSIS — E11.9 TYPE 2 DIABETES MELLITUS WITHOUT COMPLICATION, WITH LONG-TERM CURRENT USE OF INSULIN (H): ICD-10-CM

## 2024-03-26 DIAGNOSIS — G25.81 RESTLESS LEG SYNDROME: ICD-10-CM

## 2024-03-26 DIAGNOSIS — E11.40 TYPE 2 DIABETES MELLITUS WITH DIABETIC NEUROPATHY (H): ICD-10-CM

## 2024-03-26 DIAGNOSIS — E11.65 TYPE 2 DIABETES MELLITUS WITH HYPERGLYCEMIA, WITH LONG-TERM CURRENT USE OF INSULIN (H): ICD-10-CM

## 2024-03-26 DIAGNOSIS — Z79.4 TYPE 2 DIABETES MELLITUS WITH HYPERGLYCEMIA, WITH LONG-TERM CURRENT USE OF INSULIN (H): ICD-10-CM

## 2024-03-27 RX ORDER — INSULIN GLARGINE 100 [IU]/ML
INJECTION, SOLUTION SUBCUTANEOUS
Qty: 30 ML | Refills: 1 | Status: SHIPPED | OUTPATIENT
Start: 2024-03-27 | End: 2024-05-13

## 2024-03-27 NOTE — TELEPHONE ENCOUNTER
LANTUS SOLOSTAR 100 UNIT/ML  Inject 35-42 units at bedtime as directed + 2 units for priming of pen. Max dose 44 units   Last Written Prescription Date:  11/27/23  Last Fill Quantity: 45 ml ,   # refills: 0  Last Office Visit : 1/31/24  Future Office visit:  5/23/24    Routing refill request to provider for review/approval because:  Insulin - refilled per clinic

## 2024-03-28 RX ORDER — ROPINIROLE 0.25 MG/1
0.25 TABLET, FILM COATED ORAL EVERY EVENING
Qty: 90 TABLET | Refills: 0 | Status: SHIPPED | OUTPATIENT
Start: 2024-03-28 | End: 2024-06-24

## 2024-03-28 RX ORDER — LORATADINE 10 MG
81 TABLET ORAL DAILY
Qty: 90 TABLET | Refills: 1 | Status: SHIPPED | OUTPATIENT
Start: 2024-03-28

## 2024-03-28 RX ORDER — SEMAGLUTIDE 0.68 MG/ML
INJECTION, SOLUTION SUBCUTANEOUS
Qty: 3 ML | Refills: 0 | Status: SHIPPED | OUTPATIENT
Start: 2024-03-28 | End: 2024-04-03 | Stop reason: DRUGHIGH

## 2024-03-28 NOTE — TELEPHONE ENCOUNTER
LVD: 1/31/2024  [facility] Kelly Martinez PA-C  Endocrinology, Diabetes, and MetabolismType 2 diabetes mellitus without complication, with long-term current use of insulin    semaglutide (OZEMPIC) 2 MG/3ML pen3 mL02/7/2024

## 2024-03-30 NOTE — PROGRESS NOTES
Procedure: TKA  Facility: [facility]  Length: 120 minutes  Anesthesia: Choice  Post-op appointments needed: 2 weeks nurse only, 6 weeks with provider only.  Surgery packet/instructions given to patient?  No    Patients A1C is elevated, this will need to be at 8.0 or below before we can schedule for surgery. Explained this to patient. She is working with the diabetic educator today to discuss her numbers and medications to help. Let her know that when her numbers get to be around 8.2 to call our clinic and we can hold a date for surgery. Her A1C will need to be at 8.0 or below.  Yaa Eden RN     AIDS

## 2024-04-02 NOTE — PROGRESS NOTES
Medication Therapy Management (MTM) Encounter    ASSESSMENT:                            Medication Adherence/Access: See below for considerations    Diabetes:   Patient is not meeting A1c goal of < 7%. Blood sugars have improved with the addition of Ozempic. Would benefit from continuing to titrate up on the Ozempic. With this change would recommend decreasing  Lantus dose. Due for A1c, sees endocrinology in May    PLAN:                          Increase Ozempic to 1mg weekly and decrease Lantus to 25 units.    Follow-up: 4 weeks    SUBJECTIVE/OBJECTIVE:                          Holly Muir is a 69 year old female called for a follow-up visit.  Today's visit is a follow-up MTM visit from 3/6/2024.     Reason for visit:  blood sugars. Ozempic titration    Allergies/ADRs: Reviewed in chart  Past Medical History: Reviewed in chart  Tobacco: She reports that she quit smoking about 47 years ago. Her smoking use included cigarettes. She started smoking about 48 years ago. She has never used smokeless tobacco.  Alcohol: not currently using  Lives between two daughters.     Medication Adherence/Access: daughters help manage medications. Uses a pill box. Patient is not very familiar with her medications.  Per patient, daughters hand her her medications to her.  Has timer on her phone to remind her when to take her insulin.  The patient fills medications at [place]: NO, fills medications at [facility]    Diabetes   Type 2 Diabetes:    Lantus 30 units daily  Novolog as needed-has not been taking  Ozempic 0.5mg weekly Mondays (has taken 3-4 doses)  Actos 45mg daily  Glimepiride 4mg twice daily  Metformin XR 1000mg twice daily  Not taking aspirin due to Apixiban    Patient is not experiencing side effects.  Blood sugar monitoring: Continuous Glucose Monitor-has been having issues with the luke. Has been using  her back up meter. Feels like her blood sugars are getting better with the Ozempic, No blood sugars  in the 300's or 400's, rarely seeing blood sugars in the 200's.   Date FBG/ 2hours post Lunch/2hours post Dinner /2hours post    4/3 142      4/2 130 119 133 /214 (had a snack)   4/1 114      3/29 93        Current diabetes symptoms: None  Diet/Exercise: Breakfast-boiled eggs, Latvian toast with sugar free syrup  Doesn't always eat lunch or if she does will have meat (shredded or ground beef)  Supper-hamburger meat, mixed vegetables, Burger Kevin  Snacking- yogurt  Has been exercising her legs daily. Physical therapy once a week.  Drinks water, rarely has a soda  Follows with endocrinology  Doesn't feel like she has lost any  weight     Eye exam in the last 12 months? No  Foot exam: due  Urine Albumin:   Lab Results   Component Value Date    UMALCR 64.26 (H) 09/15/2023      Lab Results   Component Value Date    A1C 7.4 (H) 12/01/2023       ----------------  I spent 12 minutes with this patient today. All changes were made via collaborative practice agreement with Dr. King. A copy of the visit note was provided to the patient's provider(s).    A summary of these recommendations was sent via [email or web address].    Telemedicine Visit Details  Type of service:  Telephone visit  Start Time: 11:01AM  End Time: 11:13AM       Medication Therapy Recommendations  Type 2 diabetes mellitus with hyperglycemia, with long-term current use of insulin (H)    Current Medication: Semaglutide, 1 MG/DOSE, (OZEMPIC) 4 MG/3ML pen   Rationale: Dose too low - Dosage too low - Effectiveness   Recommendation: Increase Dose   Status: Accepted per CPA

## 2024-04-03 ENCOUNTER — VIRTUAL VISIT (OUTPATIENT)
Dept: PHARMACY | Facility: CLINIC | Age: 69
End: 2024-04-03
Payer: COMMERCIAL

## 2024-04-03 DIAGNOSIS — Z79.4 TYPE 2 DIABETES MELLITUS WITHOUT COMPLICATION, WITH LONG-TERM CURRENT USE OF INSULIN (H): Primary | ICD-10-CM

## 2024-04-03 DIAGNOSIS — E11.9 TYPE 2 DIABETES MELLITUS WITHOUT COMPLICATION, WITH LONG-TERM CURRENT USE OF INSULIN (H): Primary | ICD-10-CM

## 2024-04-03 PROCEDURE — 99607 MTMS BY PHARM ADDL 15 MIN: CPT | Mod: 93 | Performed by: PHARMACIST

## 2024-04-03 PROCEDURE — 99606 MTMS BY PHARM EST 15 MIN: CPT | Mod: 93 | Performed by: PHARMACIST

## 2024-04-03 NOTE — PATIENT INSTRUCTIONS
"Recommendations from today's MTM visit:                                                         Increase Ozempic to 1mg weekly and decrease Lantus to 25 units.    Follow-up: 4 weeks    It was great speaking with you today.  I value your experience and would be very thankful for your time in providing feedback in our clinic survey. In the next few days, you may receive an email or text message from [facility] [employer] with a link to a survey related to your  clinical pharmacist.\"     To schedule another MTM appointment, please call the clinic directly or you may call the MTM scheduling line at [phone] or toll-free at [phone].     My Clinical Pharmacist's contact information:                                                      Please feel free to contact me with any questions or concerns you have.      Liz Calero, Pharm.D, [facility]  Medication Therapy Management Pharmacist      "

## 2024-04-04 DIAGNOSIS — K21.9 GASTROESOPHAGEAL REFLUX DISEASE WITHOUT ESOPHAGITIS: ICD-10-CM

## 2024-04-11 ENCOUNTER — VIRTUAL VISIT (OUTPATIENT)
Dept: PSYCHIATRY | Facility: CLINIC | Age: 69
End: 2024-04-11
Payer: COMMERCIAL

## 2024-04-11 DIAGNOSIS — F41.1 GAD (GENERALIZED ANXIETY DISORDER): ICD-10-CM

## 2024-04-11 DIAGNOSIS — F25.1 SCHIZOAFFECTIVE DISORDER, DEPRESSIVE TYPE (H): Primary | ICD-10-CM

## 2024-04-11 PROCEDURE — 99214 OFFICE O/P EST MOD 30 MIN: CPT | Mod: 95 | Performed by: NURSE PRACTITIONER

## 2024-04-11 RX ORDER — BUSPIRONE HYDROCHLORIDE 30 MG/1
TABLET ORAL
Qty: 60 TABLET | Refills: 3 | Status: SHIPPED | OUTPATIENT
Start: 2024-04-11 | End: 2024-06-07

## 2024-04-11 RX ORDER — MIRTAZAPINE 7.5 MG/1
7.5 TABLET, FILM COATED ORAL AT BEDTIME
Qty: 30 TABLET | Refills: 3 | Status: SHIPPED | OUTPATIENT
Start: 2024-04-11 | End: 2024-06-07

## 2024-04-11 RX ORDER — PAROXETINE 40 MG/1
40 TABLET, FILM COATED ORAL EVERY EVENING
Qty: 30 TABLET | Refills: 3 | Status: SHIPPED | OUTPATIENT
Start: 2024-04-11 | End: 2024-06-07

## 2024-04-11 RX ORDER — BUPROPION HYDROCHLORIDE 200 MG/1
200 TABLET, EXTENDED RELEASE ORAL
Qty: 30 TABLET | Refills: 3 | Status: SHIPPED | OUTPATIENT
Start: 2024-04-11 | End: 2024-06-07

## 2024-04-11 RX ORDER — ARIPIPRAZOLE 20 MG/1
20 TABLET ORAL EVERY EVENING
Qty: 30 TABLET | Refills: 0 | Status: SHIPPED | OUTPATIENT
Start: 2024-04-11 | End: 2024-06-07 | Stop reason: ALTCHOICE

## 2024-04-11 NOTE — NURSING NOTE
Is the patient currently in the state of [place]? YES    Visit mode:VIDEO    If the visit is dropped, the patient can be reconnected by: VIDEO VISIT: Text to cell phone:   Telephone Information:   Mobile [phone]       Will anyone else be joining the visit? NO  (If patient encounters technical issues they should call [phone]    How would you like to obtain your AVS? MyChart    Are changes needed to the allergy or medication list? Pt stated no changes to allergies and Pt stated no med changes    Are refills needed on medications prescribed by this physician?   Reason for visit: JENNIE AVILESF

## 2024-04-11 NOTE — PROGRESS NOTES
"Virtual Visit Details    Type of service:  Video Visit   Video Start Time:  12:53 PM  Video End Time: 1:20 PM    Originating Location (pt. Location): Home    Distant Location (provider location):  On-site  Platform used for Video Visit: [facility]                   Outpatient Psychiatric Progress Note    Name: Holly Muir   : 1955                    Primary Care Provider: Tiffany King MD PhD   Therapist: None    PHQ-9 scores:      2024    10:35 AM 2024    10:24 AM 2024     3:41 PM   PHQ-9 SCORE   PHQ-9 Total Score MyChart 5 (Mild depression)     PHQ-9 Total Score 5 21 5       ISABELA-7 scores:      2023     9:49 AM 2023     9:43 AM 2024    10:26 AM   ISABELA-7 SCORE   Total Score 8 (mild anxiety) 20 (severe anxiety)    Total Score 8 20 20       Patient Identification:    Patient is a 69 year old year old, single  White Not  or  female  who presents for return visit with me.  Patient is currently unemployed. Patient attended the session alone. Patient prefers to be called: \" Holly\".    Current medications include:   Current Outpatient Medications   Medication Sig Dispense Refill    acetaminophen (TYLENOL) 325 MG tablet Take 2 tablets (650 mg) by mouth every 4 hours as needed for other (For optimal non-opioid multimodal pain management to improve pain control.) 100 tablet 0    ARIPiprazole (ABILIFY) 20 MG tablet Take 1 tablet (20 mg) by mouth every evening 30 tablet 3    blood glucose (NO BRAND SPECIFIED) test strip Use to test blood sugar 4 times daily or as directed. accu check guide strips 360 strip 3    blood glucose monitoring (NO BRAND SPECIFIED) meter device kit Use to test blood sugar 4  times daily or as directed. Accuceck Guide meter 1 kit 1    blood glucose monitoring (ONE TOUCH ULTRA 2) meter device kit USE TO TEST BLOOD SUGAR 4 TIMES DAILY OR AS DIRECTED. ACCUCECK GUIDE METER      blood glucose monitoring (ULTRA THIN 30G) lancets Use to test blood sugar 4  times " daily or as directed.lancets for acchu check guide 400 each 3    buPROPion (WELLBUTRIN SR) 200 MG 12 hr tablet Take 1 tablet (200 mg) by mouth daily (with breakfast) 30 tablet 3    busPIRone HCl (BUSPAR) 30 MG tablet TAKE 1 TABLET BY MOUTH TWICE A DAY 60 tablet 3    Continuous Blood Gluc  (FREESTYLE CM 2 READER) GALINA 1 Dose continuous 1 each 0    Continuous Blood Gluc Sensor (FREESTYLE CM 2 SENSOR) MISC Use one sensor every 14 days to read blood sugar 9 each 3    [facility] ASPIRIN ADULT LOW DOSE 81 MG chewable tablet TAKE 1 TABLET BY MOUTH EVERY DAY 90 tablet 1    diclofenac (VOLTAREN) 1 % topical gel Apply 4 g topically 4 times daily 350 g 3    ferrous gluconate (FERGON) 324 (38 Fe) MG tablet Take 1 tablet (324 mg) by mouth daily (with breakfast) 90 tablet 3    gabapentin (NEURONTIN) 600 MG tablet TAKE 2 TABLETS (1,200 MG) BY MOUTH 3 TIMES DAILY FOR 30 DAYS 180 tablet 5    glimepiride (AMARYL) 2 MG tablet Take 2 tablets (4 mg) by mouth 2 times daily (before meals) 360 tablet 3    insulin aspart (NOVOLOG FLEXPEN) 100 UNIT/ML pen Using as needed for high blood sugars      insulin glargine (LANTUS SOLOSTAR) 100 UNIT/ML pen Inject 30-35 units at bedtime as directed + 2 units for priming of pen. Max dose 35 units. 30 mL 1    insulin pen needle (31G X 5 MM) 31G X 5 MM miscellaneous Use four times 4 day times a day 200 each 3    metFORMIN (GLUCOPHAGE XR) 500 MG 24 hr tablet Take 2 tablets (1,000 mg) by mouth 2 times daily (with meals) Take two tablets with breakfast and one tablet with dinner for one week then increase to two tablets with breakfast and two tablets with dinner thereafter 360 tablet 3    mirtazapine (REMERON) 7.5 MG tablet Take 1 tablet (7.5 mg) by mouth at bedtime 30 tablet 3    Multiple Vitamin (DAILY-ISAAC MULTIVITAMIN) TABS TAKE 1 TABLET BY MOUTH EVERY DAY 90 tablet 0    naloxone (NARCAN) 4 MG/0.1ML nasal spray Spray 1 spray (4 mg) into one nostril alternating nostrils once as needed for opioid  reversal every 2-3 minutes until assistance arrives 0.2 mL 0    nystatin (MYCOSTATIN) 851592 UNIT/GM external powder Apply 1 dose topically 3 times daily as needed 60 g 3    omeprazole (PRILOSEC) 20 MG DR capsule TAKE 2 CAPSULES (40 MG) BY MOUTH DAILY *TAKE 30-60 MINUTES BEFORE A MEAL* 180 capsule 2    PARoxetine (PAXIL) 40 MG tablet Take 1 tablet (40 mg) by mouth every evening 30 tablet 3    pioglitazone (ACTOS) 45 MG tablet Take 1 tablet (45 mg) by mouth daily 90 tablet 1    propranolol ER (INDERAL LA) 80 MG 24 hr capsule TAKE 1 CAPSULE BY MOUTH EVERY DAY 90 capsule 3    ramipril (ALTACE) 5 MG capsule TAKE 1 CAPSULE BY MOUTH EVERY DAY 90 capsule 2    rOPINIRole (REQUIP) 0.25 MG tablet TAKE 1 TABLET (0.25 MG) BY MOUTH EVERY EVENING 90 tablet 0    Semaglutide, 1 MG/DOSE, (OZEMPIC) 4 MG/3ML pen Inject 1 mg Subcutaneous every 7 days 3 mL 1    simvastatin (ZOCOR) 20 MG tablet TAKE 1 TABLET BY MOUTH EVERYDAY AT BEDTIME 90 tablet 1    tiZANidine (ZANAFLEX) 2 MG tablet TAKE 1 TABLET BY MOUTH THREE TIMES A DAY AS NEEDED FOR MUSCLE SPASM 30 tablet 0    vitamin C (CVS VITAMIN C) 500 MG tablet 1/2 TABLET BY MOUTH JACOBSON WITH IRON SUPPLEMENT BEFORE MEAL 45 tablet 3    VITAMIN D3 25 MCG (1000 UT) tablet TAKE 1 TABLET BY MOUTH EVERY DAY 90 tablet 3     Current Facility-Administered Medications   Medication Dose Route Frequency Provider Last Rate Last Admin    triamcinolone (KENALOG-40) injection 40 mg  40 mg   Santy Harrington, DO   40 mg at 01/08/24 1612        The [place] Prescription Monitoring Program has been reviewed and there are no concerns about diversionary activity for controlled substances at this time.      I was able to review most recent Primary Care Provider, specialty provider, and therapy visit notes that I have access to.     Today, patient reports that she sees shadows that look like people.  One day she saw a rat go by out of her eye going through her bedroom to the bathroom.  These visions have been  "occurring off and on throughout the past year.  She tries to talk herself into believing that these are the result of her imagination.  During the day she experiences lots of worry.  It has been difficult for her to leave the home as it can be complicated to care for her colostomy bag.     Gets sad for no reason  .  At times she questions \"is this all there is\"?  She has been experiencing pain in her legs and buttocks and then this further isolates her from leaving her house.  She may need to have left knee surgery - consult at the end of May.  No SI. Stays home all the time - hard to walk - has to keep sitting dowmn - people have to wait on her.  Uses cane.  No sleep concerns.   No nightmares.  Arthritis in spine -   Blood sugars are good    She gets forgetful.  Left faucet on a couple of times.  Repeats self when talking to other people.     has a past medical history of Bilateral knee pain, Cataracts, both eyes (5/8/2013), Cervical cancer (H), Chronic kidney disease, stage 3a (H) (11/17/2021), Diabetes, Diabetic retinopathy (H), HTN, Inflammatory arthritis, Major depression, Memory loss, Nephropathy, OA (osteoarthritis) of knee (9/11/2013), Other and unspecified hyperlipidemia, Pterygium eye, and Sacral nerve root injury.    She has no past medical history of Amblyopia, Bleeding disorder (H24), Glaucoma, Glaucoma (increased eye pressure), Heart disease, Macular degeneration, Retinal detachment, Senile macular degeneration, Sleep apnea, Strabismus, Stroke (H), Surgical complications, Unspecified asthma(493.90), or Uveitis.    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      Substance use updates:    No alcohol use reported  Tobacco use: No    Vital Signs:   There were no vitals taken for this visit.    Labs:    Most recent laboratory results reviewed and no new labs.     Mental Status Examination:  Appearance: awake, alert and casual dress  Attitude: cooperative  Eye Contact:  " adequate  Gait and Station: No dizziness or falls  Psychomotor Behavior:  intact station, gait and muscle tone  Oriented to:  time, person, and place  Attention Span and Concentration:  Normal  Speech:   vtspeech: clear, coherent and Speaks: English  Mood:  anxious and depressed  Affect:  mood congruent  Associations:  no loose associations  Thought Process:  goal oriented  Thought Content:  no evidence of suicidal ideation or homicidal ideation, no auditory hallucinations present, and visual hallucinations present  Recent and Remote Memory:  intact Not formally assessed. No amnesia.  Fund of Knowledge: appropriate  Insight:  good  Judgment: good  Impulse Control:  good    Suicide Risk Assessment:  Today Holly Muir reports no thoughts to harm themself or others. In addition, there are notable risk factors for self-harm, including anxiety and psychosis. However, risk is mitigated by commitment to family, history of seeking help when needed, future oriented, denies suicidal intent or plan, and denies homicidal ideation, intent, or plan. Therefore, based on all available evidence including the factors cited above, Holly Muir does not appear to be at imminent risk for self-harm, does not meet criteria for a 72-hr hold, and therefore remains appropriate for ongoing outpatient level of care.  A thorough assessment of risk factors related to suicide and self-harm have been reviewed and are noted above. The patient convincingly denies suicidality on several occasions. Local community safety resources printed and reviewed for patient to use if needed. There was no deceit detected, and the patient presented in a manner that was believable.     DSM5 Diagnosis:  295.70  (F25.1) Schizoaffective Disorder Depressive Type  300.02 (F41.1) Generalized Anxiety Disorder    Medical comorbidities include:   Patient Active Problem List    Diagnosis Date Noted    S/P total knee arthroplasty, right 11/03/2023     Priority: Medium     Osteoarthritis of right knee, unspecified osteoarthritis type 10/02/2023     Priority: Medium    Noncompliance with medication regimen 09/07/2023     Priority: Medium     overuse of oxycodone, not taking gabapentin as scheduled.      Iron deficiency anemia secondary to inadequate dietary iron intake 08/04/2023     Priority: Medium    Pneumonia 05/07/2023     Priority: Medium    Chronic right shoulder pain 02/17/2023     Priority: Medium    Chronic kidney disease, stage 3a (H) 11/17/2021     Priority: Medium    Restless leg syndrome 09/22/2021     Priority: Medium    Tremor 09/22/2021     Priority: Medium    Fecal urgency 05/20/2020     Priority: Medium     Added automatically from request for surgery [id number]      Pseudophakia of right eye 10/25/2019     Priority: Medium    Anal stenosis 06/27/2019     Priority: Medium    S/P partial colectomy 06/27/2019     Priority: Medium    GERD (gastroesophageal reflux disease) 01/23/2019     Priority: Medium    Anemia 01/22/2019     Priority: Medium    Chest pain, musculoskeletal 01/22/2019     Priority: Medium    Diabetic neuropathy (H) 01/22/2019     Priority: Medium    Hyponatremia 01/22/2019     Priority: Medium    Schizoaffective disorder (H) 01/22/2019     Priority: Medium    Obesity (BMI 35.0-39.9) with comorbidity (H) 11/14/2018     Priority: Medium    Visual hallucination 04/21/2016     Priority: Medium    Type 2 diabetes mellitus with hyperglycemia, with long-term current use of insulin (H) 12/23/2015     Priority: Medium    Fecal incontinence due to anorectal disorder 09/28/2015     Priority: Medium    Anxiety 09/02/2015     Priority: Medium    Bilateral low back pain without sciatica 06/17/2015     Priority: Medium    Urinary retention 01/22/2015     Priority: Medium    Colostomy, evaluate (H) 10/17/2014     Priority: Medium    Encounter for long-term (current) use of insulin (H) 10/12/2014     Priority: Medium    Hypoglycemia due to insulin 09/17/2014      Priority: Medium    Rotator cuff impingement syndrome 08/07/2014     Priority: Medium    Health Care Home 09/20/2013     Priority: Medium     Date:  3-4-16  Status:  Accepted          Cataracts, both eyes 05/08/2013     Priority: Medium    Dermatochalasis 05/10/2012     Priority: Medium    Presbyopia 05/10/2012     Priority: Medium    OCD (obsessive compulsive disorder) 05/10/2012     Priority: Medium    HTN, goal below 140/90 05/10/2012     Priority: Medium    Pain in joint, hand 01/25/2012     Priority: Medium    Trigger finger, acquired 01/25/2012     Priority: Medium     Problem list name updated by automated process. Provider to review      Synovitis and tenosynovitis 01/25/2012     Priority: Medium     Problem list name updated by automated process. Provider to review      Mixed incontinence urge and stress 06/01/2010     Priority: Medium    Memory loss 06/01/2010     Priority: Medium     Diabetes coma around 2003      S/P colostomy (H) 04/03/2010     Priority: Medium     Due to injury to colon while she had a hysterectomy (age late 30s):  Seen Dr. Doherty in colorectal surgery at [facility]-does not want to operated. Needs her social support and memory loss issues be resolved first.       History of cervical cancer 09/23/2009     Priority: Medium     status post NATALIE/BSO. Being followed by Dr. Angelita Addison at [facility] gyn/onc      Severe major depression with psychotic features (H) 07/31/2009     Priority: Medium     Psychiatrist: Dr. Perkins at [facility].   Neuropsychological evalation at [facility] on 12/1/09: lower end cognitive functioning and multifactorial in etiologies including effects of psychosis, medications nd perhaps non-restorative sleep. Also has features of OCD. Recommend psychiatry referral for further evaluation of past Dx of schizophrenia and also psychotherapy.   Sees a therapist monthly now (has met goals) but will monitor for relapse        Hyperlipidemia LDL goal <100 01/27/2009     Priority: Medium    Primary generalized (osteo)arthritis 01/29/2014     Priority: Low       Assessment:    Holly Muir is concerned about experiencing ongoing visual hallucinations.  Instead of Abilify, which she will taper off of, I will start Vraylar to help give her relief.  Bupropion is ordered for depression symptoms as well as paroxetine and mirtazapine.  Symptoms are exacerbated by her medical condition and chronic pain symptoms.  Buspirone is somewhat helpful in managing her anxiety symptoms and will continue..    Medication side effects and alternatives were reviewed. Health promotion activities recommended and reviewed today. All questions addressed. Education and counseling completed regarding risks and benefits of medications and psychotherapy options.    Treatment Plan:      1.  Decrease Abilify to 20 mg every evening for 14 days then 10 mg in the evening for 14 days then stop  2.  Continue bupropion 200 mg daily  3.  Continue buspirone 30 mg 2 times daily  4.  Add Vraylar 1.5 mg daily for 1 day then 3 mg daily thereafter  5.  Mirtazapine 7.5 mg at bedtime  6.  Paroxetine 40 mg every evening    Continue all other medications as reviewed per electronic medical record today.   Safety plan reviewed. To the Emergency Department as needed or call after hours crisis line at [phone] or [phone]. [place] Crisis Text Line. Text [place] to 950955 or Suicide LifeLine Chat: suicidepreventionlifeline.org/chat/  To schedule individual or family therapy, call [facility] at [phone]  Schedule an appointment with me in 6 weeks or sooner as needed. Call [facility] at [phone] to schedule.  Follow up with primary care provider as planned or for acute medical concerns.  Call the psychiatric nurse line with medication questions or concerns at [phone]  MyChart may be used to communicate with your provider, but this is  not intended to be used for emergencies.        Crisis Resources   The [facility] is an adults only unit located at [facility] in [place] is a short term (generally less than 23 hour stay) designed for crisis intervention and stabilization. Pts have the opportunity to meet quickly with a behavioral health team for evaluation in a calm and peaceful therapuetic environment. To be evaluated for admission pts are triaged throught the [facility] ED.      The following hotlines are for both adults and children. The and are open 24 hours a day, 7 days a week unless noted otherwise.        Crisis Lines      Crisis Text Line  Text 364638  You will be connected with a trained live crisis counselor to provide support.        Gambling Hotline  0.489.629.1534 [hope]        línea de crisis española  422.712.6146        [facility] SuitMe Helpline  [phone]        National Hope Line  2.791.621.7281 [hope]        National Suicide Prevention Lifeline  Free and confidential support  988 or 1.548.263.TALK [8255]  http://suicidepreventionlifeline.org        The [employer] Project (LGBTQ Youth Crisis Line)  7.213.526.1208  text START to 491-099        's Crisis Line  3.901.150.3537 (Press 1)  or text 629464    [facility] Mental Health Crisis Response  Within [place], call **CRISIS [**342615] to be connected to a mental health professional who can assist you.        [facility] Crisis  [phone]      [facility] Mobile Crisis  [phone]      [facility] Crisis  [phone]      [facility] Mobile Crisis  [phone] (adults)  [phone] (children)      [facility] Mobile Crisis  [phone] (adults)  [phone] (children)      [facility] Mobile Crisis  [phone]      [facility] Mobile Crisis  [phone]    Community Resources      Fast Tracker  Linking people to mental health and substance use disorder resources  fasttrackermn.org        [facility]  Warmline  Peer to peer support  5 pm to 9 am 7 days/week  9.834.943.8829  https://mnwitw.org/ashwini        National [place] on Mental Illness (SOBEIDA)  525.772.1812 or 1.888.SOBEIDA.HELPS  https://namimn.org/        [facility]  Psychiatric residents [address]  [phone]        [facility]  Free mental health counseling  https://walkin.org/  612.870.0565 X2    Mental Health Apps      Calm Harm  https://calmharm.co.uk/      My3  https://my3app.org/      [place] Safety Plan  https://www.mysafetyplan.org/   Administrative Billing:   Time spent with patient includes counseling and coordination of care regarding above diagnoses and treatment plan.    Patient Status:  This is a continuous care patient and medications will be prescribed by the psychiatric provider until further indicated.    Signed:   OSWALDO Poon-BC   Psychiatry

## 2024-04-12 NOTE — PATIENT INSTRUCTIONS
"Patient Education   The Panel Psychiatry Program  What to Expect  Here's what to expect in the Panel Psychiatry Program.   About the program  You'll be meeting with a psychiatric doctor to check your mental health. A psychiatric doctor helps you deal with troubling thoughts and feelings by giving you medicine. They'll make sure you know the plan for your care. You may see them for a long time. When you're feeling better, they may refer you back to seeing your family doctor.   If you have any questions, we'll be glad to talk to you.  About visits  Be open  At your visits, please talk openly about your problems. It may feel hard, but it's the best way for us to help you.  Cancelling visits  If you can't come to your visit, please call us right away at [phone]. If you don't cancel at least 24 hours (1 full day) before your visit, that's \"late cancellation.\"  Not showing up for your visits  Being very late is the same as not showing up. You'll be a \"no show\" if:  You're more than 15 minutes late for a 30-minute (half hour) visit.  You're more than 30 minutes late for a 60-minute (full hour) visit.  If you cancel late or don't show up 2 times within 6 months, we may end your care.  Getting help between visits  If you need help between visits, you can call us Monday to Friday from 8 a.m. to 4:30 p.m. at [phone].  Emergency care  Call 911 or go to the nearest emergency department if your life or someone else's life is in danger.  Call 988 anytime to reach the national Suicide and Crisis hotline.  Medicine refills  To refill your medicine, call your pharmacy. You can also call [facility] [place] at [phone], Monday to Friday, 8 a.m. to 4:30 p.m. It can take 1 to 3 business days to get a refill.   Forms, letters, and tests  You may have papers to fill out, like FMLA, short-term disability, and workability. We can help you with these forms at your visits, but you must have an " appointment. You may need more than 1 visit for this, to be in an intensive therapy program, or both.  Before we can give you medicine for ADHD, we may refer you to get tested for it or confirm it another way.  We may not be able to give you an emotional support animal letter.  We don't do mental health checks ordered by the court.   We don't do mental health testing, but we can refer you to get tested.   Thank you for choosing us for your care.  For informational purposes only. Not to replace the advice of your health care provider. Copyright   2022 [facility]. All rights reserved. That{img} [id number] - 12/22.       Treatment Plan:      1.  Decrease Abilify to 20 mg every evening for 14 days then 10 mg in the evening for 14 days then stop  2.  Continue bupropion 200 mg daily  3.  Continue buspirone 30 mg 2 times daily  4.  Add Vraylar 1.5 mg daily for 1 day then 3 mg daily thereafter  5.  Mirtazapine 7.5 mg at bedtime  6.  Paroxetine 40 mg every evening    Continue all other medical directions per primary care provider.   Continue all other medications as reviewed per electronic medical record today.   Safety plan reviewed. To the Emergency Department as needed or call after hours crisis line at [phone] or [phone]. [place] Crisis Text Line: Text [place] to 415594  or  Suicide LifeLine Chat: suicidepreventionlifeline.org/chat/  To schedule individual or family therapy, call [facility] at [phone].   Schedule an appointment with me in 6 weeks or sooner as needed.  Call [facility] at [phone] to schedule.  Follow up with primary care provider as planned or for acute medical concerns.  Call the psychiatric nurse line with medication questions or concerns at [phone].  MyChart may be used to communicate with your provider, but this is not intended to be used for emergencies.        Crisis Resources   The EmPath is an adults only unit located at [facility]  hospital in [place] is a short term (generally less than 23 hour stay) designed for crisis intervention and stabilization. Pts have the opportunity to meet quickly with a behavioral health team for evaluation in a calm and peaceful therapuetic environment. To be evaluated for admission pts are triaged throught the [facility] ED.      The following hotlines are for both adults and children. The and are open 24 hours a day, 7 days a week unless noted otherwise.        Crisis Lines      Crisis Text Line  Text 442155  You will be connected with a trained live crisis counselor to provide support.        Gambling Hotline  9.490.414.1698 [hope]        línea de crisis española  682.426.3918        [facility] [place] [facility] Helpline  724.075.4134        National Hope Line  5.032.270.3222 [hope]        National Suicide Prevention Lifeline  Free and confidential support  988 or 1.919.817.TALK [8255]  http://suicidepreventionlifeline.org        The Zhou Project (LGBTQ Youth Crisis Line)  0.352.208.8912  text START to 756-551        [facility] Crisis Line  9.163.965.0567 (Press 1)  or text 874322    [facility] Mental Health Crisis Response  Within [place], call **CRISIS [**250087] to be connected to a mental health professional who can assist you.        [facility] Crisis  [phone]      [facility] Mobile Crisis  [phone]      [facility] Crisis  [phone]      [facility] Mobile Crisis  [phone] (adults)  [phone] (children)      [facility] Mobile Crisis  [phone] (adults)  [phone] (children)      [facility] Mobile Crisis  [phone]      [facility] Mobile Crisis  [phone]    Community Resources      Fast Tracker  Linking people to mental health and substance use disorder resources  fasttrackAnacor Pharmaceuticaln.org        [place] Mental Health Warmline  Peer to peer support  5 pm to 9 am 7 days/week  7.927.717.7793  https://mnwitw.org/mnwarmline        National  [place] on Mental Illness (SOBEIDA)  262.308.0435 or 1.888.SOBEIDA.HELPS  https://namimn.org/        [facility] for Adult Mental Health  [facility] residents [address]  [phone]        [facility]  Free mental health counseling  https://walkin.org/  612.870.0565 X2    Mental Health Apps      Calm Harm  https://calmharm.co.uk/      My3  https://my3app.org/      [place] Safety Plan  https://www.mysafetyplan.org/

## 2024-04-20 ENCOUNTER — HEALTH MAINTENANCE LETTER (OUTPATIENT)
Age: 69
End: 2024-04-20

## 2024-04-26 ENCOUNTER — TELEPHONE (OUTPATIENT)
Dept: FAMILY MEDICINE | Facility: CLINIC | Age: 69
End: 2024-04-26
Payer: COMMERCIAL

## 2024-04-26 NOTE — TELEPHONE ENCOUNTER
FYI to pcp. Once we get forms we will create encounter and send to patient for review. As of now 4/26 @ 3:52 pm we do not have anything for pt.  Maria T Elias CMA

## 2024-04-26 NOTE — TELEPHONE ENCOUNTER
Forms/Letter Request    Type of form/letter: OTHER: Forms for getting a PCA       Do we have the form/letter: Yes: Sent over    Who is the form from? PCA company (if other please explain)    Where did/will the form come from? form was mailed in or faxed. Pt is unsure    When is form/letter needed by: As soon as possible    How would you like the form/letter returned: Mail  Is this the correct address?: No -[facility]  [id number] [place] [id number]    Patient Notified form requests are processed in 5-7 business days:Yes    Could we send this information to you in iRewind or would you prefer to receive a phone call?:   Patient would prefer a phone call   Okay to leave a detailed message?: Yes at Home number on file [phone] (home)

## 2024-04-28 DIAGNOSIS — I10 HTN, GOAL BELOW 140/90: ICD-10-CM

## 2024-04-29 ENCOUNTER — TELEPHONE (OUTPATIENT)
Dept: FAMILY MEDICINE | Facility: CLINIC | Age: 69
End: 2024-04-29
Payer: COMMERCIAL

## 2024-04-29 RX ORDER — RAMIPRIL 5 MG/1
CAPSULE ORAL
Qty: 90 CAPSULE | Refills: 1 | Status: SHIPPED | OUTPATIENT
Start: 2024-04-29

## 2024-04-29 NOTE — TELEPHONE ENCOUNTER
Home Care is calling regarding an established patient with  [facility].        6/8/2023     3:15 PM   Home Care Information   Date of Home Care episode start 6/8/2023   Current following provider Dr. Tiffany King    Name/Phone Number Greg (RN Case Manager), [phone]   Home Care agency [facility]     Requesting orders from: Tiffany King  Provider is following patient: No       Orders Requested    Skilled Nursing  Request for initial evaluation and treatment (one time) (first set of orders)     HHA (Home Health Aide)  Request for initial evaluation and treatment (one time) (first set of orders)         Information was gathered and will be sent to provider for review.  RN will contact Home Care with information after provider review.  Information was gathered and will be sent to provider to confirm provider will be following patient.  RN will contact Home Care with information after provider review.  Confirmed ok to leave a detailed message with call back.  Contact information confirmed and updated as needed.    Betsy Mcconnell RN

## 2024-04-30 NOTE — TELEPHONE ENCOUNTER
RN called Chanel back and left message on confidential VM asking her to call clinic back at [phone].      If patient calls back, ask her provider's questions below.      Dominique Foley RN

## 2024-04-30 NOTE — TELEPHONE ENCOUNTER
Chanel calling back - Writer relayed provider message below as written. She states the patient is just needing PCA services and needed verbal orders for nursing and PCA orders to be able to go out there and do an assessment. Patient requiring them for medication management.     Routing to provider to review and advise.    Bill Rinaldi RN  [facility]

## 2024-05-02 ENCOUNTER — TELEPHONE (OUTPATIENT)
Dept: FAMILY MEDICINE | Facility: CLINIC | Age: 69
End: 2024-05-02
Payer: COMMERCIAL

## 2024-05-02 DIAGNOSIS — M25.561 ARTHRALGIA OF BOTH KNEES: ICD-10-CM

## 2024-05-02 DIAGNOSIS — G25.81 RESTLESS LEG SYNDROME: ICD-10-CM

## 2024-05-02 DIAGNOSIS — J30.2 SEASONAL ALLERGIC RHINITIS, UNSPECIFIED TRIGGER: ICD-10-CM

## 2024-05-02 DIAGNOSIS — M25.562 ARTHRALGIA OF BOTH KNEES: ICD-10-CM

## 2024-05-02 DIAGNOSIS — S92.352A CLOSED FRACTURE OF FIFTH METATARSAL BONE OF LEFT FOOT, PHYSEAL INVOLVEMENT UNSPECIFIED, INITIAL ENCOUNTER: ICD-10-CM

## 2024-05-02 RX ORDER — GABAPENTIN 600 MG/1
TABLET ORAL
Qty: 180 TABLET | Refills: 5 | Status: SHIPPED | OUTPATIENT
Start: 2024-05-02

## 2024-05-02 NOTE — TELEPHONE ENCOUNTER
Shahla, , called back from [facility] Was calling to follow up on orders since they haven't gotten a response back from provider yet.     Routing to provider as high priority.     Dominique Foley, REEMAN, RN   [facility] [place] [facility]

## 2024-05-02 NOTE — TELEPHONE ENCOUNTER
Dee calling from Home care to confirm orders. It was not left on VM what orders were approved. Provided further clarification for caller that it is:  Skilled Nursing  Request for initial evaluation and treatment (one time) (first set of orders)      HHA (Home Health Aide)  Request for initial evaluation and treatment (one time) (first set of orders)     Thank you - Brook Alfonso, REEMAN, RN

## 2024-05-02 NOTE — TELEPHONE ENCOUNTER
Writer called Shahla who transferred me to  of RN. Writer relayed provider message below as written and to call clinic back if she had any other questions or cnncerns.    Bill Rinaldi RN  [facility]

## 2024-05-07 RX ORDER — CETIRIZINE HYDROCHLORIDE 10 MG/1
10 TABLET ORAL DAILY
Qty: 30 TABLET | Refills: 5 | Status: SHIPPED | OUTPATIENT
Start: 2024-05-07

## 2024-05-08 ENCOUNTER — MEDICAL CORRESPONDENCE (OUTPATIENT)
Dept: HEALTH INFORMATION MANAGEMENT | Facility: CLINIC | Age: 69
End: 2024-05-08
Payer: COMMERCIAL

## 2024-05-13 ENCOUNTER — TELEPHONE (OUTPATIENT)
Dept: FAMILY MEDICINE | Facility: CLINIC | Age: 69
End: 2024-05-13
Payer: COMMERCIAL

## 2024-05-13 ENCOUNTER — VIRTUAL VISIT (OUTPATIENT)
Dept: PHARMACY | Facility: CLINIC | Age: 69
End: 2024-05-13
Payer: COMMERCIAL

## 2024-05-13 DIAGNOSIS — E11.9 TYPE 2 DIABETES MELLITUS WITHOUT COMPLICATION, WITH LONG-TERM CURRENT USE OF INSULIN (H): Primary | ICD-10-CM

## 2024-05-13 DIAGNOSIS — E11.40 TYPE 2 DIABETES MELLITUS WITH DIABETIC NEUROPATHY (H): ICD-10-CM

## 2024-05-13 DIAGNOSIS — Z79.4 TYPE 2 DIABETES MELLITUS WITHOUT COMPLICATION, WITH LONG-TERM CURRENT USE OF INSULIN (H): Primary | ICD-10-CM

## 2024-05-13 PROCEDURE — 99606 MTMS BY PHARM EST 15 MIN: CPT | Mod: 93 | Performed by: PHARMACIST

## 2024-05-13 PROCEDURE — 99607 MTMS BY PHARM ADDL 15 MIN: CPT | Mod: 93 | Performed by: PHARMACIST

## 2024-05-13 RX ORDER — INSULIN GLARGINE 100 [IU]/ML
INJECTION, SOLUTION SUBCUTANEOUS
Status: SHIPPED
Start: 2024-05-13 | End: 2024-09-13

## 2024-05-13 NOTE — PATIENT INSTRUCTIONS
"Recommendations from today's MTM visit:                                                       Increase Ozempic to 2mg weekly on June 3rd and decrease Lantus to 15 units daily.    Follow-up: 6 weeks    It was great speaking with you today.  I value your experience and would be very thankful for your time in providing feedback in our clinic survey. In the next few days, you may receive an email or text message from [facility] [employer] with a link to a survey related to your  clinical pharmacist.\"     To schedule another MTM appointment, please call the clinic directly or you may call the MTM scheduling line at [phone] or toll-free at [phone].     My Clinical Pharmacist's contact information:                                                      Please feel free to contact me with any questions or concerns you have.      Liz Calero, Pharm.D, [facility]  Medication Therapy Management Pharmacist      "

## 2024-05-13 NOTE — TELEPHONE ENCOUNTER
Forms/Letter Request    Type of form/letter: need ICD 10 codes and med list     Do we have the form/letter: YES- Placed on provider's desk    Who is the form from? Asher [facility]  Where did/will the form come from? form was faxed in    How would you like the form/letter returned: Fax : [phone]

## 2024-05-13 NOTE — TELEPHONE ENCOUNTER
Forms/Letter Request    Type of form/letter: Physician order # [id number]    Do we have the form/letter: YES- Placed on provider's desk    Who is the form from? Home Health Care     Where did/will the form come from? fax    How would you like the form/letter returned: Fax : [phone]

## 2024-05-13 NOTE — PROGRESS NOTES
Medication Therapy Management (MTM) Encounter    ASSESSMENT:                            Medication Adherence/Access: See below for considerations    Diabetes:   Patient is not meeting A1c goal of < 7%. Blood sugars have improved with the addition of Ozempic. Would benefit from continuing to titrate up on the Ozempic. With this change would recommend decreasing  Lantus dose. Due for A1c, sees endocrinology in May    PLAN:                          Increase Ozempic to 2mg weekly on June 3rd and decrease Lantus to 15 units daily.    Follow-up: Endocrinology in 2 weeks, MTM in 6 weeks    SUBJECTIVE/OBJECTIVE:                          Holly Muir is a 69 year old female called for a follow-up visit.  Today's visit is a follow-up MTM visit from 4/3/2024.     Reason for visit:  blood sugars. Ozempic titration    Allergies/ADRs: Reviewed in chart  Past Medical History: Reviewed in chart  Tobacco: She reports that she quit smoking about 47 years ago. Her smoking use included cigarettes. She started smoking about 48 years ago. She has never used smokeless tobacco.  Alcohol: not currently using  Lives between two daughters.     Medication Adherence/Access: daughters help manage medications. Uses a pill box. Patient is not very familiar with her medications.  Per patient, daughters hand her her medications to her.  Has timer on her phone to remind her when to take her insulin.  The patient fills medications at [facility]: NO, fills medications at [facility]    Diabetes   Type 2 Diabetes:    Lantus 20 units daily  Ozempic 1mg weekly Mondays-has 2 doses left  Actos 45mg daily  Glimepiride 4mg twice daily  Metformin XR 1000mg twice daily  Not taking aspirin due to Apixiban    Patient is not experiencing side effects.  Blood sugar monitoring: Continuous Glucose Monitor-has been having issues with the luke. Has been using  her back up meter. Will be getting new phone next month. No blood sugars over 200. 105, 122,  145mg/dL    Current diabetes symptoms: None  Diet/Exercise: Breakfast-boiled eggs, Polish toast with sugar free syrup  Doesn't always eat lunch or if she does will have meat (shredded or ground beef)  Supper-hamburger meat, mixed vegetables, Burger Kevin  Snacking- yogurt  Has been exercising her legs daily. Physical therapy once a week.  Drinks water, rarely has a soda  Follows with endocrinology  Has lost a couple of pounds with Ozempic. Has not noticed any changes in appetite.     Eye exam in the last 12 months? No  Foot exam: due  Urine Albumin:   Lab Results   Component Value Date    UMALCR 64.26 (H) 09/15/2023      Lab Results   Component Value Date    A1C 7.4 12/01/2023    A1C 7.7 09/15/2023    A1C 7.9 07/14/2023    A1C 8.7 04/26/2023    A1C 9.1 01/19/2023    A1C 8.9 11/12/2021    A1C 7.7 01/06/2021    A1C 7.9 12/11/2020    A1C 10.6 08/24/2020    A1C 9.5 06/18/2020     ----------------  I spent 10 minutes with this patient today. All changes were made via collaborative practice agreement with Dr. King. A copy of the visit note was provided to the patient's provider(s).    A summary of these recommendations was sent via [employer].    Telemedicine Visit Details  Type of service:  Telephone visit  Start Time: 3:08PM  End Time: 3:18M       Medication Therapy Recommendations  Type 2 diabetes mellitus with hyperglycemia, with long-term current use of insulin (H)    Current Medication: Semaglutide, 1 MG/DOSE, (OZEMPIC) 4 MG/3ML pen   Rationale: Dose too low - Dosage too low - Effectiveness   Recommendation: Increase Dose   Status: Accepted per CPA

## 2024-05-16 ENCOUNTER — MEDICAL CORRESPONDENCE (OUTPATIENT)
Dept: HEALTH INFORMATION MANAGEMENT | Facility: CLINIC | Age: 69
End: 2024-05-16
Payer: COMMERCIAL

## 2024-05-16 NOTE — TELEPHONE ENCOUNTER
Completed form has been faxed to [phone] . Right-Fax reviewed to confirm form was sent without error. [facility]

## 2024-05-20 NOTE — TELEPHONE ENCOUNTER
Reason for Call:  Medication or medication refill:    Do you use a [facility]?  Name of the pharmacy and phone number for the current request:  [facility]    Name of the medication requested: glimetirid    Other request: patient is out of medication because of the increase in the Rx. Patient takes 4 a day now.    Can we leave a detailed message on this number? YES    Phone number patient can be reached at: Cell number on file:    Telephone Information:   Mobile [phone]       Best Time: anytime    Call taken on 9/24/2022 at 12:58 PM by RAY OCHOA     no

## 2024-05-20 NOTE — PROGRESS NOTES
Outcome for 05/20/24 4:02 PM:  Mathieu reminder sent to upload   Beverley Rueda CMA  Adult Endocrinology   [facility]

## 2024-05-21 ENCOUNTER — OFFICE VISIT (OUTPATIENT)
Dept: ORTHOPEDICS | Facility: CLINIC | Age: 69
End: 2024-05-21
Payer: COMMERCIAL

## 2024-05-21 VITALS — BODY MASS INDEX: 34.6 KG/M2 | HEIGHT: 62 IN | WEIGHT: 188 LBS

## 2024-05-21 DIAGNOSIS — G89.29 CHRONIC PAIN OF LEFT KNEE: Primary | ICD-10-CM

## 2024-05-21 DIAGNOSIS — E11.9 TYPE 2 DIABETES MELLITUS WITHOUT COMPLICATION, WITH LONG-TERM CURRENT USE OF INSULIN (H): ICD-10-CM

## 2024-05-21 DIAGNOSIS — M25.562 CHRONIC PAIN OF LEFT KNEE: Primary | ICD-10-CM

## 2024-05-21 DIAGNOSIS — Z79.4 TYPE 2 DIABETES MELLITUS WITHOUT COMPLICATION, WITH LONG-TERM CURRENT USE OF INSULIN (H): ICD-10-CM

## 2024-05-21 PROCEDURE — 99213 OFFICE O/P EST LOW 20 MIN: CPT | Mod: GC | Performed by: ORTHOPAEDIC SURGERY

## 2024-05-21 ASSESSMENT — KOOS JR
KOOS JR SCORING: 20.94
TWISING OR PIVOTING ON KNEE: EXTREME
HOW SEVERE IS YOUR KNEE STIFFNESS AFTER FIRST WAKING IN MORNING: SEVERE
STANDING UPRIGHT: SEVERE
STRAIGHTENING KNEE FULLY: EXTREME
GOING UP OR DOWN STAIRS: SEVERE
BENDING TO THE FLOOR TO PICK UP OBJECT: EXTREME
RISING FROM SITTING: EXTREME

## 2024-05-21 ASSESSMENT — PAIN SCALES - GENERAL: PAINLEVEL: MODERATE PAIN (4)

## 2024-05-21 NOTE — LETTER
5/21/2024         RE: Holly Muir  [address] [facility] [id number]        Dear Colleague,    Thank you for referring your patient, Holly Muir, to the [facility]. Please see a copy of my visit note below.    Chief Complaint: Consult For (Left knee pain)       HPI: Holly Muir returns today in follow-up for right total knee arthroplasty, date of surgery 8/2023, as well as for evaluation of left knee pain.  She reports she is doing very well with fact her right knee she is happy with her outcome with respect to motion and pain relief.  She reports her right knee does not significantly limit her from activities.    With respect to her left knee, she describes pain in the front of the knee that has been progressive for the last couple of years.  She has a history of a left knee surgery after some sort of injury sustained in the year of 2000 and, she underwent surgery in [place].  She is unclear of the details of the surgery and what exactly was done.  She describes pain with walking, stairs.  She has tried ice, heat, over-the-counter medications.  He also had an intra-articular corticosteroid injection 1/8/2024 in the left knee which provided approximately 50% relief that lasted a couple months.  In addition to the above pain she describes numbness or tingling that is along the outer aspect of her lower leg radiating into the toes.  She does endorse back pain but denies any radicular pain radiating from her back down her thigh into the leg.  He has a back injection scheduled for the end of this month.      Medications and allergies are documented in the EMR and have been reviewed.    Current Outpatient Medications:      acetaminophen (TYLENOL) 325 MG tablet, Take 2 tablets (650 mg) by mouth every 4 hours as needed for other (For optimal non-opioid multimodal pain management to improve pain control.), Disp: 100 tablet, Rfl: 0     ARIPiprazole (ABILIFY) 20 MG  tablet, Take 1 tablet (20 mg) by mouth every evening For 14 days then 1/2 tablet (10 mg) in the evening for 14 days then stop, Disp: 30 tablet, Rfl: 0     blood glucose (NO BRAND SPECIFIED) test strip, Use to test blood sugar 4 times daily or as directed. accu check guide strips, Disp: 360 strip, Rfl: 3     blood glucose monitoring (NO BRAND SPECIFIED) meter device kit, Use to test blood sugar 4  times daily or as directed. Accuceck Guide meter, Disp: 1 kit, Rfl: 1     blood glucose monitoring (ONE TOUCH ULTRA 2) meter device kit, USE TO TEST BLOOD SUGAR 4 TIMES DAILY OR AS DIRECTED. ACCUCECK GUIDE METER, Disp: , Rfl:      blood glucose monitoring (ULTRA THIN 30G) lancets, Use to test blood sugar 4  times daily or as directed.lancets for acchu check guide, Disp: 400 each, Rfl: 3     buPROPion (WELLBUTRIN SR) 200 MG 12 hr tablet, Take 1 tablet (200 mg) by mouth daily (with breakfast), Disp: 30 tablet, Rfl: 3     busPIRone HCl (BUSPAR) 30 MG tablet, TAKE 1 TABLET BY MOUTH TWICE A DAY, Disp: 60 tablet, Rfl: 3     cariprazine (VRAYLAR) 1.5 MG capsule, Take 1 capsule (1.5 mg) by mouth daily For one day then 2 capsules daily (3 mg), Disp: 60 capsule, Rfl: 0     cetirizine (ZYRTEC) 10 MG tablet, Take 1 tablet (10 mg) by mouth daily, Disp: 30 tablet, Rfl: 5     Continuous Blood Gluc  (FREESTYLE CM 2 READER) [facility], 1 Dose continuous, Disp: 1 each, Rfl: 0     Continuous Blood Gluc Sensor (FREESTYLE CM 2 SENSOR) [facility], Use one sensor every 14 days to read blood sugar, Disp: 9 each, Rfl: 3     [employer] ASPIRIN ADULT LOW DOSE 81 MG chewable tablet, TAKE 1 TABLET BY MOUTH EVERY DAY, Disp: 90 tablet, Rfl: 1     diclofenac (VOLTAREN) 1 % topical gel, Apply 4 g topically 4 times daily, Disp: 350 g, Rfl: 3     ferrous gluconate (FERGON) 324 (38 Fe) MG tablet, Take 1 tablet (324 mg) by mouth daily (with breakfast), Disp: 90 tablet, Rfl: 3     gabapentin (NEURONTIN) 600 MG tablet, TAKE 2 TABLETS (1,200 MG) BY MOUTH 3 TIMES DAILY  FOR 30 DAYS, Disp: 180 tablet, Rfl: 5     glimepiride (AMARYL) 2 MG tablet, Take 2 tablets (4 mg) by mouth 2 times daily (before meals), Disp: 360 tablet, Rfl: 3     insulin glargine (LANTUS SOLOSTAR) 100 UNIT/ML pen, Inject 20 units at bedtime as directed + 2 units for priming of pen., Disp: , Rfl:      insulin pen needle (31G X 5 MM) 31G X 5 MM miscellaneous, Use four times 4 day times a day, Disp: 200 each, Rfl: 3     metFORMIN (GLUCOPHAGE XR) 500 MG 24 hr tablet, Take 2 tablets (1,000 mg) by mouth 2 times daily (with meals) Take two tablets with breakfast and one tablet with dinner for one week then increase to two tablets with breakfast and two tablets with dinner thereafter, Disp: 360 tablet, Rfl: 3     mirtazapine (REMERON) 7.5 MG tablet, Take 1 tablet (7.5 mg) by mouth at bedtime, Disp: 30 tablet, Rfl: 3     Multiple Vitamin (DAILY-ISAAC MULTIVITAMIN) TABS, TAKE 1 TABLET BY MOUTH EVERY DAY, Disp: 90 tablet, Rfl: 0     naloxone (NARCAN) 4 MG/0.1ML nasal spray, Spray 1 spray (4 mg) into one nostril alternating nostrils once as needed for opioid reversal every 2-3 minutes until assistance arrives, Disp: 0.2 mL, Rfl: 0     nystatin (MYCOSTATIN) 797801 UNIT/GM external powder, Apply 1 dose topically 3 times daily as needed, Disp: 60 g, Rfl: 3     omeprazole (PRILOSEC) 20 MG DR capsule, TAKE 2 CAPSULES (40 MG) BY MOUTH DAILY *TAKE 30-60 MINUTES BEFORE A MEAL*, Disp: 180 capsule, Rfl: 2     PARoxetine (PAXIL) 40 MG tablet, Take 1 tablet (40 mg) by mouth every evening, Disp: 30 tablet, Rfl: 3     pioglitazone (ACTOS) 45 MG tablet, Take 1 tablet (45 mg) by mouth daily, Disp: 90 tablet, Rfl: 1     propranolol ER (INDERAL LA) 80 MG 24 hr capsule, TAKE 1 CAPSULE BY MOUTH EVERY DAY, Disp: 90 capsule, Rfl: 3     ramipril (ALTACE) 5 MG capsule, TAKE 1 CAPSULE BY MOUTH EVERY DAY, Disp: 90 capsule, Rfl: 1     rOPINIRole (REQUIP) 0.25 MG tablet, TAKE 1 TABLET (0.25 MG) BY MOUTH EVERY EVENING, Disp: 90 tablet, Rfl: 0      "Semaglutide, 1 MG/DOSE, (OZEMPIC) 4 MG/3ML pen, Inject 1 mg Subcutaneous every 7 days, Disp: 3 mL, Rfl: 1     Semaglutide, 2 MG/DOSE, (OZEMPIC) 8 MG/3ML pen, Inject 2 mg Subcutaneous every 7 days, Disp: 9 mL, Rfl: 3     simvastatin (ZOCOR) 20 MG tablet, TAKE 1 TABLET BY MOUTH EVERYDAY AT BEDTIME, Disp: 90 tablet, Rfl: 1     tiZANidine (ZANAFLEX) 2 MG tablet, TAKE 1 TABLET BY MOUTH THREE TIMES A DAY AS NEEDED FOR MUSCLE SPASM, Disp: 30 tablet, Rfl: 0     vitamin C (CVS VITAMIN C) 500 MG tablet, 1/2 TABLET BY MOUTH JACOBSON WITH IRON SUPPLEMENT BEFORE MEAL, Disp: 45 tablet, Rfl: 3     VITAMIN D3 25 MCG (1000 UT) tablet, TAKE 1 TABLET BY MOUTH EVERY DAY, Disp: 90 tablet, Rfl: 3    Current Facility-Administered Medications:      triamcinolone (KENALOG-40) injection 40 mg, 40 mg, , , Santy Harrington, , 40 mg at 01/08/24 1612  Allergies: Morphine sulfate    Physical Exam:  On physical examination the patient appears the stated age, is in no acute distress, affect is appropriate, and breathing is non-labored.  Ht 1.58 m (5' 2.21\")   Wt 85.3 kg (188 lb)   BMI 34.16 kg/m    Body mass index is 34.16 kg/m .  Gait: nonantalgic    RIGHT KNEE  Appearance: benign, well-healed midline infrapatellar surgical incision  Clinical alignment: neutral  Effusion: no  Tenderness to palpation: no  Extension: 0  Flexion: 110  Patellar tracking: normal   Stable in in the sagittal plane in mid-flexion.    LEFT KNEE  Appearance: benign, well-healed transverse infrapatellar surgical incision  Clinical alignment: neutral  Effusion: no  Tenderness to palpation: Pes anserine bursa and quadriceps tendon  Extension: 0  Flexion: 115  Patellar tracking: normal   Collateral ligaments: intact  Stable in in the sagittal plane in mid-flexion.    Distally, the circulatory, motor, and sensation exam is intact with 5/5 EHL, gastroc-soleus, and tibialis anterior.  Sensation to light touch is intact.  Dorsalis pedis and posterior tibialis pulses are palpable.  " There are no sores on the feet, no bruising, and no lymphedema.    X-rays:    Left knee x-rays dated 3/21/2024 reviewed.  These demonstrate tricompartmental degenerative changes throughout the knee, worse in the patellofemoral compartment.  Space narrowing, subchondral cysts, osteophytes present.  Joint spaces relatively preserved in the medial and lateral compartments.    Assessment:   69-year-old female patient with bilateral knee osteoarthritis now 9 months status post right total knee arthroplasty, doing very well.  Progressing as expected.    Plan:   We reviewed our clinical impression with the patient reviewed available imaging.  Answered her questions.  With respect to her right knee she is doing very well.  With her left knee, she does have radiographic evidence of osteoarthritis however continues to get some relief from nonoperative management including injections heat and ice.  Her arthritis is not nearly as severe as it was on the right side prior to her total knee arthroplasty.  She does have some complaints of radiculopathy, numbness tingling and back pain and has a back injection scheduled for the end of the month.  We counseled the patient that for now, would be appropriate to focus on the back and see how much of her back is contributing to the complaints about her knee.  Will plan for to follow-up on an as-needed basis in the orthopedic surgery clinic to rediscuss options for management of her left knee if it continues to be bothersome, progressive, or remains refractory to nonoperative management.    I have personally examined this patient and have reviewed the clinical presentation and progress note with the resident. I agree with the treatment plan as outlined. The plan was formulated with the resident on the day of the resident's dictation.      Again, thank you for allowing me to participate in the care of your patient.        Sincerely,        Sanford Sheehan MD

## 2024-05-21 NOTE — PROGRESS NOTES
Chief Complaint: Consult For (Left knee pain)       HPI: Holly Muir returns today in follow-up for right total knee arthroplasty, date of surgery 8/2023, as well as for evaluation of left knee pain.  She reports she is doing very well with fact her right knee she is happy with her outcome with respect to motion and pain relief.  She reports her right knee does not significantly limit her from activities.    With respect to her left knee, she describes pain in the front of the knee that has been progressive for the last couple of years.  She has a history of a left knee surgery after some sort of injury sustained in the year of 2000 and, she underwent surgery in [place].  She is unclear of the details of the surgery and what exactly was done.  She describes pain with walking, stairs.  She has tried ice, heat, over-the-counter medications.  He also had an intra-articular corticosteroid injection 1/8/2024 in the left knee which provided approximately 50% relief that lasted a couple months.  In addition to the above pain she describes numbness or tingling that is along the outer aspect of her lower leg radiating into the toes.  She does endorse back pain but denies any radicular pain radiating from her back down her thigh into the leg.  He has a back injection scheduled for the end of this month.      Medications and allergies are documented in the EMR and have been reviewed.    Current Outpatient Medications:     acetaminophen (TYLENOL) 325 MG tablet, Take 2 tablets (650 mg) by mouth every 4 hours as needed for other (For optimal non-opioid multimodal pain management to improve pain control.), Disp: 100 tablet, Rfl: 0    ARIPiprazole (ABILIFY) 20 MG tablet, Take 1 tablet (20 mg) by mouth every evening For 14 days then 1/2 tablet (10 mg) in the evening for 14 days then stop, Disp: 30 tablet, Rfl: 0    blood glucose (NO BRAND SPECIFIED) test strip, Use to test blood sugar 4 times daily or as directed. accu check  guide strips, Disp: 360 strip, Rfl: 3    blood glucose monitoring (NO BRAND SPECIFIED) meter device kit, Use to test blood sugar 4  times daily or as directed. Accuceck Guide meter, Disp: 1 kit, Rfl: 1    blood glucose monitoring (ONE TOUCH ULTRA 2) meter device kit, USE TO TEST BLOOD SUGAR 4 TIMES DAILY OR AS DIRECTED. ACCUCECK GUIDE METER, Disp: , Rfl:     blood glucose monitoring (ULTRA THIN 30G) lancets, Use to test blood sugar 4  times daily or as directed.lancets for acchu check guide, Disp: 400 each, Rfl: 3    buPROPion (WELLBUTRIN SR) 200 MG 12 hr tablet, Take 1 tablet (200 mg) by mouth daily (with breakfast), Disp: 30 tablet, Rfl: 3    busPIRone HCl (BUSPAR) 30 MG tablet, TAKE 1 TABLET BY MOUTH TWICE A DAY, Disp: 60 tablet, Rfl: 3    cariprazine (VRAYLAR) 1.5 MG capsule, Take 1 capsule (1.5 mg) by mouth daily For one day then 2 capsules daily (3 mg), Disp: 60 capsule, Rfl: 0    cetirizine (ZYRTEC) 10 MG tablet, Take 1 tablet (10 mg) by mouth daily, Disp: 30 tablet, Rfl: 5    Continuous Blood Gluc  (FREESTYLE CM 2 READER) [facility], 1 Dose continuous, Disp: 1 each, Rfl: 0    Continuous Blood Gluc Sensor (FREESTYLE CM 2 SENSOR) [facility], Use one sensor every 14 days to read blood sugar, Disp: 9 each, Rfl: 3    CVS ASPIRIN ADULT LOW DOSE 81 MG chewable tablet, TAKE 1 TABLET BY MOUTH EVERY DAY, Disp: 90 tablet, Rfl: 1    diclofenac (VOLTAREN) 1 % topical gel, Apply 4 g topically 4 times daily, Disp: 350 g, Rfl: 3    ferrous gluconate (FERGON) 324 (38 Fe) MG tablet, Take 1 tablet (324 mg) by mouth daily (with breakfast), Disp: 90 tablet, Rfl: 3    gabapentin (NEURONTIN) 600 MG tablet, TAKE 2 TABLETS (1,200 MG) BY MOUTH 3 TIMES DAILY FOR 30 DAYS, Disp: 180 tablet, Rfl: 5    glimepiride (AMARYL) 2 MG tablet, Take 2 tablets (4 mg) by mouth 2 times daily (before meals), Disp: 360 tablet, Rfl: 3    insulin glargine (LANTUS SOLOSTAR) 100 UNIT/ML pen, Inject 20 units at bedtime as directed + 2 units for priming of  pen., Disp: , Rfl:     insulin pen needle (31G X 5 MM) 31G X 5 MM miscellaneous, Use four times 4 day times a day, Disp: 200 each, Rfl: 3    metFORMIN (GLUCOPHAGE XR) 500 MG 24 hr tablet, Take 2 tablets (1,000 mg) by mouth 2 times daily (with meals) Take two tablets with breakfast and one tablet with dinner for one week then increase to two tablets with breakfast and two tablets with dinner thereafter, Disp: 360 tablet, Rfl: 3    mirtazapine (REMERON) 7.5 MG tablet, Take 1 tablet (7.5 mg) by mouth at bedtime, Disp: 30 tablet, Rfl: 3    Multiple Vitamin (DAILY-ISAAC MULTIVITAMIN) TABS, TAKE 1 TABLET BY MOUTH EVERY DAY, Disp: 90 tablet, Rfl: 0    naloxone (NARCAN) 4 MG/0.1ML nasal spray, Spray 1 spray (4 mg) into one nostril alternating nostrils once as needed for opioid reversal every 2-3 minutes until assistance arrives, Disp: 0.2 mL, Rfl: 0    nystatin (MYCOSTATIN) 314500 UNIT/GM external powder, Apply 1 dose topically 3 times daily as needed, Disp: 60 g, Rfl: 3    omeprazole (PRILOSEC) 20 MG DR capsule, TAKE 2 CAPSULES (40 MG) BY MOUTH DAILY *TAKE 30-60 MINUTES BEFORE A MEAL*, Disp: 180 capsule, Rfl: 2    PARoxetine (PAXIL) 40 MG tablet, Take 1 tablet (40 mg) by mouth every evening, Disp: 30 tablet, Rfl: 3    pioglitazone (ACTOS) 45 MG tablet, Take 1 tablet (45 mg) by mouth daily, Disp: 90 tablet, Rfl: 1    propranolol ER (INDERAL LA) 80 MG 24 hr capsule, TAKE 1 CAPSULE BY MOUTH EVERY DAY, Disp: 90 capsule, Rfl: 3    ramipril (ALTACE) 5 MG capsule, TAKE 1 CAPSULE BY MOUTH EVERY DAY, Disp: 90 capsule, Rfl: 1    rOPINIRole (REQUIP) 0.25 MG tablet, TAKE 1 TABLET (0.25 MG) BY MOUTH EVERY EVENING, Disp: 90 tablet, Rfl: 0    Semaglutide, 1 MG/DOSE, (OZEMPIC) 4 MG/3ML pen, Inject 1 mg Subcutaneous every 7 days, Disp: 3 mL, Rfl: 1    Semaglutide, 2 MG/DOSE, (OZEMPIC) 8 MG/3ML pen, Inject 2 mg Subcutaneous every 7 days, Disp: 9 mL, Rfl: 3    simvastatin (ZOCOR) 20 MG tablet, TAKE 1 TABLET BY MOUTH EVERYDAY AT BEDTIME, Disp:  "90 tablet, Rfl: 1    tiZANidine (ZANAFLEX) 2 MG tablet, TAKE 1 TABLET BY MOUTH THREE TIMES A DAY AS NEEDED FOR MUSCLE SPASM, Disp: 30 tablet, Rfl: 0    vitamin C (CVS VITAMIN C) 500 MG tablet, 1/2 TABLET BY MOUTH JACOBSON WITH IRON SUPPLEMENT BEFORE MEAL, Disp: 45 tablet, Rfl: 3    VITAMIN D3 25 MCG (1000 UT) tablet, TAKE 1 TABLET BY MOUTH EVERY DAY, Disp: 90 tablet, Rfl: 3    Current Facility-Administered Medications:     triamcinolone (KENALOG-40) injection 40 mg, 40 mg, , , Santy Harrington, DO, 40 mg at 01/08/24 1612  Allergies: Morphine sulfate    Physical Exam:  On physical examination the patient appears the stated age, is in no acute distress, affect is appropriate, and breathing is non-labored.  Ht 1.58 m (5' 2.21\")   Wt 85.3 kg (188 lb)   BMI 34.16 kg/m    Body mass index is 34.16 kg/m .  Gait: nonantalgic    RIGHT KNEE  Appearance: benign, well-healed midline infrapatellar surgical incision  Clinical alignment: neutral  Effusion: no  Tenderness to palpation: no  Extension: 0  Flexion: 110  Patellar tracking: normal   Stable in in the sagittal plane in mid-flexion.    LEFT KNEE  Appearance: benign, well-healed transverse infrapatellar surgical incision  Clinical alignment: neutral  Effusion: no  Tenderness to palpation: Pes anserine bursa and quadriceps tendon  Extension: 0  Flexion: 115  Patellar tracking: normal   Collateral ligaments: intact  Stable in in the sagittal plane in mid-flexion.    Distally, the circulatory, motor, and sensation exam is intact with 5/5 EHL, gastroc-soleus, and tibialis anterior.  Sensation to light touch is intact.  Dorsalis pedis and posterior tibialis pulses are palpable.  There are no sores on the feet, no bruising, and no lymphedema.    X-rays:    Left knee x-rays dated 3/21/2024 reviewed.  These demonstrate tricompartmental degenerative changes throughout the knee, worse in the patellofemoral compartment.  Space narrowing, subchondral cysts, osteophytes present.  Joint " spaces relatively preserved in the medial and lateral compartments.    Assessment:   69-year-old female patient with bilateral knee osteoarthritis now 9 months status post right total knee arthroplasty, doing very well.  Progressing as expected.    Plan:   We reviewed our clinical impression with the patient reviewed available imaging.  Answered her questions.  With respect to her right knee she is doing very well.  With her left knee, she does have radiographic evidence of osteoarthritis however continues to get some relief from nonoperative management including injections heat and ice.  Her arthritis is not nearly as severe as it was on the right side prior to her total knee arthroplasty.  She does have some complaints of radiculopathy, numbness tingling and back pain and has a back injection scheduled for the end of the month.  We counseled the patient that for now, would be appropriate to focus on the back and see how much of her back is contributing to the complaints about her knee.  Will plan for to follow-up on an as-needed basis in the orthopedic surgery clinic to rediscuss options for management of her left knee if it continues to be bothersome, progressive, or remains refractory to nonoperative management.

## 2024-05-21 NOTE — NURSING NOTE
"Holly Muir's chief complaint for this visit includes:  Chief Complaint   Patient presents with    Consult For     Left knee pain       Referring Provider:  No referring provider defined for this encounter.    Ht 1.58 m (5' 2.21\")   Wt 85.3 kg (188 lb)   BMI 34.16 kg/m    Moderate Pain (4)   Global Mental Health Score: (P) 8  Global Physical Health Score: (P) 9  PROMIS TOTAL - SUBSCORES: (P) 17  [facility]: 0  KOOS Jr.  20.94    Pain increases with: Walking, sitting, bending down to pick something up  Previous surgeries: right total knee arthroplasty DOS; 8/2023  Previous injections within last 6 months: YES - Date: 1/8/24  Treatments done: Injection, heat, ice  Imaging completed: 3/21/24  Pain: 4/10  Concerns: Discuss if she can have surgery on left knee    Opal Mathew ATC          "

## 2024-05-22 RX ORDER — BLOOD SUGAR DIAGNOSTIC
STRIP MISCELLANEOUS
Qty: 400 STRIP | Refills: 3 | Status: SHIPPED | OUTPATIENT
Start: 2024-05-22

## 2024-05-22 NOTE — TELEPHONE ENCOUNTER
Routing refill request to diabetes provider due to upcoming appointment on 5/23/24:     Future Appointments 5/21/2024 - 11/17/2024        Date Visit Type Length Department Provider     5/23/2024 10:40 AM RETURN DIABETES 20 min MG ENDO MG ENDO NURSE     5/23/2024 11:00 AM RETURN DIABETES 30 min MG ENDO Kelly Yan PA-C              5/31/2024  2:00 PM XR LUMBAR EPIDURAL INJECTION 45 min MG XRAY MGXR3     5/31/2024  2:15 PM XR LUMBAR EPIDURAL INJECTION 30 min MG XRAY MG NEURO RAD    Location Instructions:     [address] [address]  Parking Imaging customers can park in the lots on either side of the driveway leading to the West Entrance of the building.  Entrance and check-in location Enter through the West Entrance doors. Proceed straight ahead, through the lobby, to Check-In B (adjacent to the [facility]). Please check-in at with the registration staff at the desk labeled Check-In B.              6/7/2024  1:45 PM ADULT PSYCHIATRY RETURN 30 min [facility] PSYCHIATRY Stacey Serrano NP              6/24/2024 12:30 PM RETURN - MTM 30 min MG MTM Liz Calero, [facility]                    Requested Prescriptions   Pending Prescriptions Disp Refills    ONETOUCH ULTRA test strip [Pharmacy Med Name: ONE TOUCH ULTRA BLUE TEST STRP] 400 strip 3     Sig: USE TO TEST BLOOD SUGAR 4 TIMES DAILY OR AS DIRECTED. ACCU CHECK GUIDE STRIPS       Diabetic Supplies Protocol Passed - 5/21/2024 12:11 AM        Passed - Medication is active on med list        Passed - Recent (12 month) or future (90 days) visit with authorizing provider s specialty     The patient must have completed an in-person or virtual visit within the past 12 months or has a future visit scheduled within the next 90 days with the authorizing provider s specialty.  Urgent care and e-visits do not quality as an office visit for this protocol.          Passed - Medication indicated for associated  diagnosis        Passed - Patient is 18 years of age or older             Tonia Saez RN on 5/21/2024 at 9:55 PM

## 2024-05-23 ENCOUNTER — OFFICE VISIT (OUTPATIENT)
Dept: ENDOCRINOLOGY | Facility: CLINIC | Age: 69
End: 2024-05-23
Payer: COMMERCIAL

## 2024-05-23 VITALS
BODY MASS INDEX: 33.8 KG/M2 | DIASTOLIC BLOOD PRESSURE: 80 MMHG | HEART RATE: 82 BPM | OXYGEN SATURATION: 99 % | WEIGHT: 186 LBS | SYSTOLIC BLOOD PRESSURE: 130 MMHG

## 2024-05-23 DIAGNOSIS — Z79.4 TYPE 2 DIABETES MELLITUS WITHOUT COMPLICATION, WITH LONG-TERM CURRENT USE OF INSULIN (H): Primary | ICD-10-CM

## 2024-05-23 DIAGNOSIS — E11.9 TYPE 2 DIABETES MELLITUS WITHOUT COMPLICATION, WITH LONG-TERM CURRENT USE OF INSULIN (H): Primary | ICD-10-CM

## 2024-05-23 DIAGNOSIS — E66.01 MORBID OBESITY (H): ICD-10-CM

## 2024-05-23 LAB
ANION GAP SERPL CALCULATED.3IONS-SCNC: 9 MMOL/L (ref 7–15)
BUN SERPL-MCNC: 12.7 MG/DL (ref 8–23)
CALCIUM SERPL-MCNC: 10.3 MG/DL (ref 8.8–10.2)
CHLORIDE SERPL-SCNC: 99 MMOL/L (ref 98–107)
CREAT SERPL-MCNC: 0.81 MG/DL (ref 0.51–0.95)
DEPRECATED HCO3 PLAS-SCNC: 30 MMOL/L (ref 22–29)
EGFRCR SERPLBLD CKD-EPI 2021: 79 ML/MIN/1.73M2
GLUCOSE SERPL-MCNC: 229 MG/DL (ref 70–99)
HBA1C MFR BLD: 7.9 %
POTASSIUM SERPL-SCNC: 5.5 MMOL/L (ref 3.4–5.3)
SODIUM SERPL-SCNC: 138 MMOL/L (ref 135–145)

## 2024-05-23 PROCEDURE — 82570 ASSAY OF URINE CREATININE: CPT | Performed by: PHYSICIAN ASSISTANT

## 2024-05-23 PROCEDURE — G2211 COMPLEX E/M VISIT ADD ON: HCPCS | Performed by: PHYSICIAN ASSISTANT

## 2024-05-23 PROCEDURE — 99213 OFFICE O/P EST LOW 20 MIN: CPT | Performed by: PHYSICIAN ASSISTANT

## 2024-05-23 PROCEDURE — 80048 BASIC METABOLIC PNL TOTAL CA: CPT | Performed by: PHYSICIAN ASSISTANT

## 2024-05-23 PROCEDURE — 36415 COLL VENOUS BLD VENIPUNCTURE: CPT | Performed by: PHYSICIAN ASSISTANT

## 2024-05-23 PROCEDURE — 83036 HEMOGLOBIN GLYCOSYLATED A1C: CPT | Performed by: PHYSICIAN ASSISTANT

## 2024-05-23 PROCEDURE — 82043 UR ALBUMIN QUANTITATIVE: CPT | Performed by: PHYSICIAN ASSISTANT

## 2024-05-23 NOTE — PATIENT INSTRUCTIONS
Welcome to the [facility] Endocrinology and Diabetes Clinics     Our Endocrinology Clinics are here to provide you with a team-based, collaborative approach in the diagnosis and treatment of patients with diabetes and endocrine disorders. The team is made up of Physicians, Physician Assistants, Certified Diabetes Educators, Registered Nurses, Medical Assistants, Emergency Medical Technicians, and many others, all of whom have the unified goal of providing our patients with high quality care.     Please see below for some helpful tips to best navigate and use the [facility] Endocrinology clinic:     [place] Respect: At [facility], we are committed to a respectful and safe space for all patients, visitors, and staff.  We believe that mutual respect between patients and their care team is the foundation of quality care.  It is our expectation that you will be treated with respect by your care team.  In turn, we ask that all communication with the care team (written and verbal) be respectful and free from profanity, threatening, or abusive language.  Disrespectful communication undermines our therapeutic relationship with you and may result in us being unable to continue to provide your care.    Refills: A provider must see you at least annually to prescribe and refill medications. This is to ensure your safety as well as meet insurance and compliance regulations.    Scheduling: Many of our Providers offer both in-person or video visits. Please call to schedule any needed follow ups as soon as possible because our provider schedules fill up very quickly. Our care team has the right to require an in-person visit when they believe that it is medically necessary. Please remember that for any virtual visits, you must be in the [facility] at the time of the visit, otherwise we are unable to see you and you will need to be rescheduled.    Missed Appointments: If you need to cancel or miss your  scheduled appointment, please call the clinic at [phone] to reschedule.  Please note if you repeatedly miss appointments or repeatedly miss appointments without calling to inform us ahead of time (no-show), the clinic may elect to not allow you to reschedule without speaking to a manager, may require a Partnership In Care Agreement prior to rescheduling, or could result in you no longer being able to receive care from the clinic. Providing the clinic with timely notification if you have to miss an appointment, allows us to better serve the needs of all of our patients.    Primary Care Provider: Our Endocrinologists are Specialists in their field. We expect you to have a Primary Care Provider established to handle any needs outside of your diabetes and endocrine care.  We would be happy to assist you find a Primary Care Provider, if you do not have one.    Quantopian: [employer] is a wonderful resource that allows you access to your Care Team via online or the luke. Please ask a member of the team if you would like help creating an account. Please note that it may take up to 2 business days for a response. [employer] messages are not reviewed on weekends or after business hours.  Emergent or urgent care needs should never be communicated via [employer].  If you experience a medical emergency call 911 or go to the nearest emergency room.    Labs: It is recommended that you stay within the [facility] for labs but you are welcome to obtain ordered labs (with some exceptions) from any location of your choice as long as they are able to complete and process the needed labs. If you need us to fax orders to your preferred lab, please provide us the name and fax number of the lab you would like to go to so we can fax the orders. If your labs are drawn outside of the [facility], please have them fax the results to [phone] ([place]) or [phone] ([place]) or via [facility]. It is your  responsibility to ensure that outside lab results are sent to us.    We look forward to working with you. Please do not hesitate to reach out with any questions.    Thank you,    The Endocrine Team    [facility] Address:   [place] Address:     [address] [place] [address]    Phone: [phone]  Fax: [phone] [address] [place] [address]    Phone: [phone]  Fax: [phone]     [facility] Cost Estimate Phone Number: [phone]    General Lab and Imaging Scheduling Phone Number: [phone]

## 2024-05-23 NOTE — NURSING NOTE
Holly Muir's goals for this visit include:   Chief Complaint   Patient presents with    Follow Up    Diabetes     She requests these members of her care team be copied on today's visit information: Yes    PCP: Tiffany King    Referring Provider:  Referred Self, MD  No address on file    /80 (BP Location: Left arm, Patient Position: Sitting, Cuff Size: Adult Large)   Pulse 82   Wt 84.4 kg (186 lb)   SpO2 99%   BMI 33.80 kg/m      Do you need any medication refills at today's visit? Yes    Beverley Rueda CMA  Adult Endocrinology   [facility]

## 2024-05-23 NOTE — LETTER
5/23/2024         RE: Holly Muir  [id number] [id number]        Dear Colleague,    Thank you for referring your patient, Holly Muir, to the [facility]. Please see a copy of my visit note below.    Outcome for 05/20/24 4:02 PM:  Mathieu reminder sent to upload   Beverley Rueda CMA  Adult Endocrinology   [facility]        Assessment/Plan :   Type 2 DM. Holly remains stable on her current medications. She states that she feels good but she is frustrated that she has not been able to lose weight. She has not had any adverse effects related to her medication and she is excited to increase the Ozempic to 2 mg weekly. We reviewed the possible adverse effects and I do think she will notice more weight loss with the increased dose. She also decreased her Lantus to 15 unit(s) in anticipation of the increase in Ozempic but I would like her to go back up to 20 unit(s), until she starts the medication. She will have a back injection on the 31st and I am worried that it may lead to hyperglycemia, especially on the lower dose of insulin. Lastly, she is due for her yearly urinary albumin screen. An order was placed today. We will follow-up in 3-4 mos.       I have independently reviewed and interpreted labs, imaging as indicated.      Chief complaint:  Holly is a 69 year old female who returns for follow-up of Type 2 DM.    I have reviewed Care Everywhere including [facility], [facility], [facility], [facility], [facility] , [facility], [place] lab reports, imaging reports and provider notes as indicated.      HISTORY OF PRESENT ILLNESS  Holly is doing well. Her blood sugars seem to be stable and she has not had any problems with her current medications. She met with the [facility] pharmacist on May 13th and the plan is to increase the Ozempic to 2 mg weekly. Holly has been taking 1 mg weekly along with pioglitazone 45 mg daily,  metformin 2000 mg daily, glimepiride 4 mg daily, and Lantus 20 unit(s) at night. She states that she has not had any adverse effects related to the Ozempic and she has not noticed a significant impact on her appetite or weight. She is hopeful that the increased dose will help with weight loss.    Holly has not had any recent problems with severe hyperglycemia and/or hypoglycemia. She has been monitoring her blood sugars with fingerstick testing twice daily. She also denies any issues with blurred vision or an increase in numbness/tingling in her feet. She does feel like her feet sometimes feel different and she worries about developing neuropathy.      Holly was diagnosed with diabetes many years ago. She has used a combination of insulin and oral medications, for some time. She has struggled to keep her hemoglobin A1C to goal, in the past. She does understand the importance of diet and exercise in managing diabetes but she struggles with food insecurity. She states that she has been going to a new food pantry through the [employer] and that it has more options for fresh vegetables. She would really like to lose weight and she feels like that would help with her overall health. Her diabetes is complicated by obesity, hyperlipidemia, CKD stage 3, GERD, schizoaffective disorder, and osteoarthritis.     Endocrine relevant labs are as follows:   Latest Reference Range & Units 05/23/24 10:44   Afinion Hemoglobin A1c POCT <=5.7 % 7.9 (H)   (H): Data is abnormally high     Latest Reference Range & Units 12/01/23 13:01   Hemoglobin A1C 0.0 - 5.6 % 7.4 (H)   (H): Data is abnormally high    REVIEW OF SYSTEMS    Endocrine: positive for diabetes and obesity  Skin: negative  Eyes: negative for, visual blurring, redness, tearing  Ears/Nose/Throat: negative  Respiratory: No shortness of breath, dyspnea on exertion, cough, or hemoptysis  Cardiovascular: negative for, chest pain, dyspnea on exertion, lower extremity edema, and  exercise intolerance  Gastrointestinal: negative for, nausea, vomiting, constipation, and diarrhea  Genitourinary: negative for, nocturia, dysuria, frequency, and urgency  Musculoskeletal: negative for, muscular weakness, nocturnal cramping, and foot pain  Neurologic: positive for numbness or tingling of feet, negative for, local weakness, and numbness or tingling of hands  Psychiatric: negative  Hematologic/Lymphatic/Immunologic: negative    Past Medical History  Past Medical History:   Diagnosis Date     Bilateral knee pain      Cataracts, both eyes 5/8/2013     Cervical cancer (H)      Chronic kidney disease, stage 3a (H) 11/17/2021     Diabetes      Diabetic retinopathy (H)      HTN      Inflammatory arthritis      Major depression      Memory loss      Nephropathy      OA (osteoarthritis) of knee 9/11/2013     Other and unspecified hyperlipidemia      Pterygium eye      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 Accuceck Guide meter 1 kit 1     blood glucose monitoring (ONE TOUCH ULTRA 2) meter device kit USE TO TEST BLOOD SUGAR 4 TIMES DAILY OR AS DIRECTED. ACCUCECK GUIDE METER       blood glucose monitoring (ULTRA THIN 30G) lancets Use to test blood sugar 4  times daily or as directed.lancets for acchu check guide 400 each 3     buPROPion (WELLBUTRIN SR) 200 MG 12 hr tablet Take 1 tablet (200 mg) by mouth daily (with breakfast) 30 tablet 3      busPIRone HCl (BUSPAR) 30 MG tablet TAKE 1 TABLET BY MOUTH TWICE A DAY 60 tablet 3     cariprazine (VRAYLAR) 1.5 MG capsule Take 1 capsule (1.5 mg) by mouth daily For one day then 2 capsules daily (3 mg) 60 capsule 0     cetirizine (ZYRTEC) 10 MG tablet Take 1 tablet (10 mg) by mouth daily 30 tablet 5     Continuous Blood Gluc  (FREESTYLE CM 2 READER) GALINA 1 Dose continuous 1 each 0     Continuous Blood Gluc Sensor (FREESTYLE CM 2 SENSOR) MISC Use one sensor every 14 days to read blood sugar 9 each 3     CVS ASPIRIN ADULT LOW DOSE 81 MG chewable tablet TAKE 1 TABLET BY MOUTH EVERY DAY 90 tablet 1     diclofenac (VOLTAREN) 1 % topical gel Apply 4 g topically 4 times daily 350 g 3     ferrous gluconate (FERGON) 324 (38 Fe) MG tablet Take 1 tablet (324 mg) by mouth daily (with breakfast) 90 tablet 3     gabapentin (NEURONTIN) 600 MG tablet TAKE 2 TABLETS (1,200 MG) BY MOUTH 3 TIMES DAILY FOR 30 DAYS 180 tablet 5     glimepiride (AMARYL) 2 MG tablet Take 2 tablets (4 mg) by mouth 2 times daily (before meals) 360 tablet 3     insulin glargine (LANTUS SOLOSTAR) 100 UNIT/ML pen Inject 20 units at bedtime as directed + 2 units for priming of pen.       insulin pen needle (31G X 5 MM) 31G X 5 MM miscellaneous Use four times 4 day times a day 200 each 3     metFORMIN (GLUCOPHAGE XR) 500 MG 24 hr tablet Take 2 tablets (1,000 mg) by mouth 2 times daily (with meals) Take two tablets with breakfast and one tablet with dinner for one week then increase to two tablets with breakfast and two tablets with dinner thereafter 360 tablet 3     mirtazapine (REMERON) 7.5 MG tablet Take 1 tablet (7.5 mg) by mouth at bedtime 30 tablet 3     Multiple Vitamin (DAILY-ISAAC MULTIVITAMIN) TABS TAKE 1 TABLET BY MOUTH EVERY DAY 90 tablet 0     nystatin (MYCOSTATIN) 235848 UNIT/GM external powder Apply 1 dose topically 3 times daily as needed 60 g 3     omeprazole (PRILOSEC) 20 MG DR capsule TAKE 2 CAPSULES (40 MG) BY MOUTH DAILY *TAKE  30-60 MINUTES BEFORE A MEAL* 180 capsule 2     ONETOUCH ULTRA test strip USE TO TEST BLOOD SUGAR 4 TIMES DAILY OR AS DIRECTED. ACCU CHECK GUIDE STRIPS 400 strip 3     PARoxetine (PAXIL) 40 MG tablet Take 1 tablet (40 mg) by mouth every evening 30 tablet 3     pioglitazone (ACTOS) 45 MG tablet Take 1 tablet (45 mg) by mouth daily 90 tablet 1     propranolol ER (INDERAL LA) 80 MG 24 hr capsule TAKE 1 CAPSULE BY MOUTH EVERY DAY 90 capsule 3     ramipril (ALTACE) 5 MG capsule TAKE 1 CAPSULE BY MOUTH EVERY DAY 90 capsule 1     rOPINIRole (REQUIP) 0.25 MG tablet TAKE 1 TABLET (0.25 MG) BY MOUTH EVERY EVENING 90 tablet 0     Semaglutide, 2 MG/DOSE, (OZEMPIC) 8 MG/3ML pen Inject 2 mg Subcutaneous every 7 days 9 mL 3     simvastatin (ZOCOR) 20 MG tablet TAKE 1 TABLET BY MOUTH EVERYDAY AT BEDTIME 90 tablet 1     tiZANidine (ZANAFLEX) 2 MG tablet TAKE 1 TABLET BY MOUTH THREE TIMES A DAY AS NEEDED FOR MUSCLE SPASM 30 tablet 0     vitamin C (CVS VITAMIN C) 500 MG tablet 1/2 TABLET BY MOUTH JACOBSON WITH IRON SUPPLEMENT BEFORE MEAL 45 tablet 3     VITAMIN D3 25 MCG (1000 UT) tablet TAKE 1 TABLET BY MOUTH EVERY DAY 90 tablet 3     naloxone (NARCAN) 4 MG/0.1ML nasal spray Spray 1 spray (4 mg) into one nostril alternating nostrils once as needed for opioid reversal every 2-3 minutes until assistance arrives (Patient not taking: Reported on 5/23/2024) 0.2 mL 0     Semaglutide, 1 MG/DOSE, (OZEMPIC) 4 MG/3ML pen Inject 1 mg Subcutaneous every 7 days (Patient not taking: Reported on 5/23/2024) 3 mL 1       Allergies  Allergies   Allergen Reactions     Morphine Sulfate Itching         Family History  family history includes Cancer in her maternal grandfather and paternal grandfather; Cerebrovascular Disease in her mother; Diabetes in her brother, father, mother, and sister; Hypertension in her father; Multiple Sclerosis in her daughter; Thyroid Disease in her daughter and sister.    Social History  Social History     Tobacco Use     Smoking  status: Former     Current packs/day: 0.00     Types: Cigarettes     Quit date: 1974     Years since quittin.4     Passive exposure: Never     Smokeless tobacco: Never   Vaping Use     Vaping status: Never Used   Substance Use Topics     Alcohol use: Yes     Alcohol/week: 0.0 standard drinks of alcohol     Comment: social occasions     Drug use: No       Physical Exam  /80 (BP Location: Left arm, Patient Position: Sitting, Cuff Size: Adult Large)   Pulse 82   Wt 84.4 kg (186 lb)   SpO2 99%   BMI 33.80 kg/m    Body mass index is 33.8 kg/m .  GENERAL :  In no apparent distress  SKIN: Normal color, normal temperature, texture.  No hirsutism, alopecia or purple striae.     EYES: PERRL, EOMI, No scleral icterus,  No proptosis, conjunctival redness, stare, retraction  RESP: Lungs clear to auscultation bilaterally  CARDIAC: Regular rate and rhythm, normal S1 S2, without murmurs, rubs or gallops     NEURO: awake, alert, responds appropriately to questions.  Cranial nerves intact.   Moves all extremities; Gait normal.  No tremor of the outstretched hands  EXTREMITIES: No clubbing, cyanosis or edema.  FOOT EXAM: strong pedal pulses bilaterally. No obvious signs of pre-ulcerative callus formation or deformity. Sensation intact to monofilament.     DATA REVIEW  Date Morning Afternoon Evening    203      113 243       103    178  206    242      218  419    91 125 122   Ave  mg/dl         Again, thank you for allowing me to participate in the care of your patient.        Sincerely,        Kelly Yan PA-C

## 2024-05-23 NOTE — PROGRESS NOTES
Assessment/Plan :   Type 2 DM. Holly remains stable on her current medications. She states that she feels good but she is frustrated that she has not been able to lose weight. She has not had any adverse effects related to her medication and she is excited to increase the Ozempic to 2 mg weekly. We reviewed the possible adverse effects and I do think she will notice more weight loss with the increased dose. She also decreased her Lantus to 15 unit(s) in anticipation of the increase in Ozempic but I would like her to go back up to 20 unit(s), until she starts the medication. She will have a back injection on the 31st and I am worried that it may lead to hyperglycemia, especially on the lower dose of insulin. Lastly, she is due for her yearly urinary albumin screen. An order was placed today. We will follow-up in 3-4 mos.       I have independently reviewed and interpreted labs, imaging as indicated.      Chief complaint:  Holly is a 69 year old female who returns for follow-up of Type 2 DM.    I have reviewed Care Everywhere including [facility], [facility], [place], [facility], [facility] , [facility], [place] lab reports, imaging reports and provider notes as indicated.      HISTORY OF PRESENT ILLNESS  Holly is doing well. Her blood sugars seem to be stable and she has not had any problems with her current medications. She met with the [facility] pharmacist on May 13th and the plan is to increase the Ozempic to 2 mg weekly. Holly has been taking 1 mg weekly along with pioglitazone 45 mg daily, metformin 2000 mg daily, glimepiride 4 mg daily, and Lantus 20 unit(s) at night. She states that she has not had any adverse effects related to the Ozempic and she has not noticed a significant impact on her appetite or weight. She is hopeful that the increased dose will help with weight loss.    Holly has not had any recent problems with severe hyperglycemia and/or hypoglycemia. She has been monitoring her  blood sugars with fingerstick testing twice daily. She also denies any issues with blurred vision or an increase in numbness/tingling in her feet. She does feel like her feet sometimes feel different and she worries about developing neuropathy.      Holly was diagnosed with diabetes many years ago. She has used a combination of insulin and oral medications, for some time. She has struggled to keep her hemoglobin A1C to goal, in the past. She does understand the importance of diet and exercise in managing diabetes but she struggles with food insecurity. She states that she has been going to a new food pantry through the [employer] and that it has more options for fresh vegetables. She would really like to lose weight and she feels like that would help with her overall health. Her diabetes is complicated by obesity, hyperlipidemia, CKD stage 3, GERD, schizoaffective disorder, and osteoarthritis.     Endocrine relevant labs are as follows:   Latest Reference Range & Units 05/23/24 10:44   Afinion Hemoglobin A1c POCT <=5.7 % 7.9 (H)   (H): Data is abnormally high     Latest Reference Range & Units 12/01/23 13:01   Hemoglobin A1C 0.0 - 5.6 % 7.4 (H)   (H): Data is abnormally high    REVIEW OF SYSTEMS    Endocrine: positive for diabetes and obesity  Skin: negative  Eyes: negative for, visual blurring, redness, tearing  Ears/Nose/Throat: negative  Respiratory: No shortness of breath, dyspnea on exertion, cough, or hemoptysis  Cardiovascular: negative for, chest pain, dyspnea on exertion, lower extremity edema, and exercise intolerance  Gastrointestinal: negative for, nausea, vomiting, constipation, and diarrhea  Genitourinary: negative for, nocturia, dysuria, frequency, and urgency  Musculoskeletal: negative for, muscular weakness, nocturnal cramping, and foot pain  Neurologic: positive for numbness or tingling of feet, negative for, local weakness, and numbness or tingling of hands  Psychiatric:  negative  Hematologic/Lymphatic/Immunologic: negative    Past Medical History  Past Medical History:   Diagnosis Date    Bilateral knee pain     Cataracts, both eyes 5/8/2013    Cervical cancer (H)     Chronic kidney disease, stage 3a (H) 11/17/2021    Diabetes     Diabetic retinopathy (H)     HTN     Inflammatory arthritis     Major depression     Memory loss     Nephropathy     OA (osteoarthritis) of knee 9/11/2013    Other and unspecified hyperlipidemia     Pterygium eye     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 Accuceck Guide meter 1 kit 1    blood glucose monitoring (ONE TOUCH ULTRA 2) meter device kit USE TO TEST BLOOD SUGAR 4 TIMES DAILY OR AS DIRECTED. ACCUCECK GUIDE METER      blood glucose monitoring (ULTRA THIN 30G) lancets Use to test blood sugar 4  times daily or as directed.lancets for acchu check guide 400 each 3    buPROPion (WELLBUTRIN SR) 200 MG 12 hr tablet Take 1 tablet (200 mg) by mouth daily (with breakfast) 30 tablet 3    busPIRone HCl (BUSPAR) 30 MG tablet TAKE 1 TABLET BY MOUTH TWICE A DAY 60 tablet 3    cariprazine (VRAYLAR) 1.5 MG capsule Take 1 capsule (1.5 mg) by mouth daily For one day then 2 capsules daily (3 mg) 60 capsule 0    cetirizine (ZYRTEC) 10 MG tablet Take 1 tablet (10 mg) by mouth daily 30 tablet 5    Continuous Blood Gluc  (FREESTYLE CM 2 READER) GALINA 1 Dose continuous 1 each 0    Continuous Blood  Gluc Sensor (FREESTYLE CM 2 SENSOR) MISC Use one sensor every 14 days to read blood sugar 9 each 3    [employer] ASPIRIN ADULT LOW DOSE 81 MG chewable tablet TAKE 1 TABLET BY MOUTH EVERY DAY 90 tablet 1    diclofenac (VOLTAREN) 1 % topical gel Apply 4 g topically 4 times daily 350 g 3    ferrous gluconate (FERGON) 324 (38 Fe) MG tablet Take 1 tablet (324 mg) by mouth daily (with breakfast) 90 tablet 3    gabapentin (NEURONTIN) 600 MG tablet TAKE 2 TABLETS (1,200 MG) BY MOUTH 3 TIMES DAILY FOR 30 DAYS 180 tablet 5    glimepiride (AMARYL) 2 MG tablet Take 2 tablets (4 mg) by mouth 2 times daily (before meals) 360 tablet 3    insulin glargine (LANTUS SOLOSTAR) 100 UNIT/ML pen Inject 20 units at bedtime as directed + 2 units for priming of pen.      insulin pen needle (31G X 5 MM) 31G X 5 MM miscellaneous Use four times 4 day times a day 200 each 3    metFORMIN (GLUCOPHAGE XR) 500 MG 24 hr tablet Take 2 tablets (1,000 mg) by mouth 2 times daily (with meals) Take two tablets with breakfast and one tablet with dinner for one week then increase to two tablets with breakfast and two tablets with dinner thereafter 360 tablet 3    mirtazapine (REMERON) 7.5 MG tablet Take 1 tablet (7.5 mg) by mouth at bedtime 30 tablet 3    Multiple Vitamin (DAILY-ISAAC MULTIVITAMIN) TABS TAKE 1 TABLET BY MOUTH EVERY DAY 90 tablet 0    nystatin (MYCOSTATIN) 279969 UNIT/GM external powder Apply 1 dose topically 3 times daily as needed 60 g 3    omeprazole (PRILOSEC) 20 MG DR capsule TAKE 2 CAPSULES (40 MG) BY MOUTH DAILY *TAKE 30-60 MINUTES BEFORE A MEAL* 180 capsule 2    ONETOUCH ULTRA test strip USE TO TEST BLOOD SUGAR 4 TIMES DAILY OR AS DIRECTED. ACCU CHECK GUIDE STRIPS 400 strip 3    PARoxetine (PAXIL) 40 MG tablet Take 1 tablet (40 mg) by mouth every evening 30 tablet 3    pioglitazone (ACTOS) 45 MG tablet Take 1 tablet (45 mg) by mouth daily 90 tablet 1    propranolol ER (INDERAL LA) 80 MG 24 hr capsule TAKE 1 CAPSULE BY MOUTH EVERY DAY 90  capsule 3    ramipril (ALTACE) 5 MG capsule TAKE 1 CAPSULE BY MOUTH EVERY DAY 90 capsule 1    rOPINIRole (REQUIP) 0.25 MG tablet TAKE 1 TABLET (0.25 MG) BY MOUTH EVERY EVENING 90 tablet 0    Semaglutide, 2 MG/DOSE, (OZEMPIC) 8 MG/3ML pen Inject 2 mg Subcutaneous every 7 days 9 mL 3    simvastatin (ZOCOR) 20 MG tablet TAKE 1 TABLET BY MOUTH EVERYDAY AT BEDTIME 90 tablet 1    tiZANidine (ZANAFLEX) 2 MG tablet TAKE 1 TABLET BY MOUTH THREE TIMES A DAY AS NEEDED FOR MUSCLE SPASM 30 tablet 0    vitamin C (CVS VITAMIN C) 500 MG tablet 1/2 TABLET BY MOUTH JACOBSON WITH IRON SUPPLEMENT BEFORE MEAL 45 tablet 3    VITAMIN D3 25 MCG (1000 UT) tablet TAKE 1 TABLET BY MOUTH EVERY DAY 90 tablet 3    naloxone (NARCAN) 4 MG/0.1ML nasal spray Spray 1 spray (4 mg) into one nostril alternating nostrils once as needed for opioid reversal every 2-3 minutes until assistance arrives (Patient not taking: Reported on 2024) 0.2 mL 0    Semaglutide, 1 MG/DOSE, (OZEMPIC) 4 MG/3ML pen Inject 1 mg Subcutaneous every 7 days (Patient not taking: Reported on 2024) 3 mL 1       Allergies  Allergies   Allergen Reactions    Morphine Sulfate Itching         Family History  family history includes Cancer in her maternal grandfather and paternal grandfather; Cerebrovascular Disease in her mother; Diabetes in her brother, father, mother, and sister; Hypertension in her father; Multiple Sclerosis in her daughter; Thyroid Disease in her daughter and sister.    Social History  Social History     Tobacco Use    Smoking status: Former     Current packs/day: 0.00     Types: Cigarettes     Quit date: 1974     Years since quittin.4     Passive exposure: Never    Smokeless tobacco: Never   Vaping Use    Vaping status: Never Used   Substance Use Topics    Alcohol use: Yes     Alcohol/week: 0.0 standard drinks of alcohol     Comment: social occasions    Drug use: No       Physical Exam  /80 (BP Location: Left arm, Patient Position: Sitting,  Cuff Size: Adult Large)   Pulse 82   Wt 84.4 kg (186 lb)   SpO2 99%   BMI 33.80 kg/m    Body mass index is 33.8 kg/m .  GENERAL :  In no apparent distress  SKIN: Normal color, normal temperature, texture.  No hirsutism, alopecia or purple striae.     EYES: PERRL, EOMI, No scleral icterus,  No proptosis, conjunctival redness, stare, retraction  RESP: Lungs clear to auscultation bilaterally  CARDIAC: Regular rate and rhythm, normal S1 S2, without murmurs, rubs or gallops     NEURO: awake, alert, responds appropriately to questions.  Cranial nerves intact.   Moves all extremities; Gait normal.  No tremor of the outstretched hands  EXTREMITIES: No clubbing, cyanosis or edema.  FOOT EXAM: strong pedal pulses bilaterally. No obvious signs of pre-ulcerative callus formation or deformity. Sensation intact to monofilament.     DATA REVIEW  Date Morning Afternoon Evening   5/23 203     5/22 113 243    5/21   103   5/20 178  206   5/19 242     5/18 218  419   5/17 91 125 122   Ave  mg/dl

## 2024-05-24 LAB
CREAT UR-MCNC: 92.8 MG/DL
MICROALBUMIN UR-MCNC: 91.8 MG/L
MICROALBUMIN/CREAT UR: 98.92 MG/G CR (ref 0–25)

## 2024-05-28 ENCOUNTER — PATIENT OUTREACH (OUTPATIENT)
Dept: GERIATRIC MEDICINE | Facility: CLINIC | Age: 69
End: 2024-05-28
Payer: COMMERCIAL

## 2024-05-28 NOTE — PROGRESS NOTES
[facility] Care Coordination Contact  CC received notification of Emergency Room visit.  ER visit occurred on 05/26/24 at [facility]  with Dx of UTI.    CC contacted member and left a message requesting a return call.  Member has a follow-up appointment with PCP: No: Offered Assistance with setting up a follow up appointment  Member has had a change in condition: No  New referrals placed: No  Home Visit Needed: No  Care plan reviewed and updated.  PCP notified of ED visit via EMR.        Malathi Bacon RN PHN  Care Coordinator  [facility]  O: [phone]  C:[phone]  F:[phone]

## 2024-05-28 NOTE — Clinical Note
You are receiving this message because this member is on the state government program [facility] [facility] or [facility] [facility].  are required to notify the primary care physician with all ED visits, admissions and discharges from hospitals and nursing homes. If you have further questions please feel free to contact me.  Malathi Bacon RN [facility] Coordinator [phone]

## 2024-05-31 ENCOUNTER — ANCILLARY PROCEDURE (OUTPATIENT)
Dept: GENERAL RADIOLOGY | Facility: CLINIC | Age: 69
End: 2024-05-31
Attending: FAMILY MEDICINE
Payer: COMMERCIAL

## 2024-05-31 DIAGNOSIS — M54.16 LUMBAR RADICULOPATHY: ICD-10-CM

## 2024-05-31 PROCEDURE — 62323 NJX INTERLAMINAR LMBR/SAC: CPT | Performed by: RADIOLOGY

## 2024-05-31 RX ORDER — IOPAMIDOL 408 MG/ML
2 INJECTION, SOLUTION INTRATHECAL ONCE
Status: COMPLETED | OUTPATIENT
Start: 2024-05-31 | End: 2024-05-31

## 2024-05-31 RX ORDER — LIDOCAINE HYDROCHLORIDE 10 MG/ML
5 INJECTION, SOLUTION EPIDURAL; INFILTRATION; INTRACAUDAL; PERINEURAL ONCE
Status: COMPLETED | OUTPATIENT
Start: 2024-05-31 | End: 2024-05-31

## 2024-05-31 RX ORDER — METHYLPREDNISOLONE ACETATE 80 MG/ML
80 INJECTION, SUSPENSION INTRA-ARTICULAR; INTRALESIONAL; INTRAMUSCULAR; SOFT TISSUE ONCE
Status: COMPLETED | OUTPATIENT
Start: 2024-05-31 | End: 2024-05-31

## 2024-05-31 RX ORDER — BUPIVACAINE HYDROCHLORIDE 5 MG/ML
2 INJECTION, SOLUTION PERINEURAL ONCE
Status: COMPLETED | OUTPATIENT
Start: 2024-05-31 | End: 2024-05-31

## 2024-05-31 RX ADMIN — METHYLPREDNISOLONE ACETATE 80 MG: 80 INJECTION, SUSPENSION INTRA-ARTICULAR; INTRALESIONAL; INTRAMUSCULAR; SOFT TISSUE at 14:23

## 2024-05-31 RX ADMIN — BUPIVACAINE HYDROCHLORIDE 10 MG: 5 INJECTION, SOLUTION PERINEURAL at 14:23

## 2024-05-31 RX ADMIN — IOPAMIDOL 2 ML: 408 INJECTION, SOLUTION INTRATHECAL at 14:23

## 2024-05-31 RX ADMIN — LIDOCAINE HYDROCHLORIDE 5 ML: 10 INJECTION, SOLUTION EPIDURAL; INFILTRATION; INTRACAUDAL; PERINEURAL at 14:23

## 2024-05-31 NOTE — PROGRESS NOTES
Luh was seen in X-ray today for a epidural injection. Patient rated pain before procedure 8/10. After procedure patient rated pain 1/10.   This pain level is acceptable to patient. Patient discharged home with .

## 2024-05-31 NOTE — DISCHARGE SUMMARY
AFTER YOU GO HOME    DO relax; minimize your activity for 24 hours  You may resume normal activity tomorrow  You may remove the bandage in the evening or next morning  You may resume bathing the next day  Drink at least 4 extra glasses of fluid today if not on fluid restrictions  DO NOT drive or operate machinery at home or at work for at least 24 hours      VISIT THE EMERGENCY ROOM OR URGENT CARE IF:    There is redness or swelling at the injection site  There is discharge from the injection site  You develop a temperature of 101  F or greater      ADDITIONAL INSTRUCTIONS:     You may resume your Coumadin or other blood thinner at your regular dose today.  Follow up with your physician to have your INR rechecked if indicated.  If you gain no relief from the injection after two (2) weeks, follow-up with your provider for your options.        Contacts:    During business hours from 8 to 5 pm, you may call [phone] to reach a nurse advisor at [facility].  After hours, call [facility]  [phone].  Ask for the Radiologist on-call.  Someone is on-call 24 hrs/day.  [facility] Toll Free Number   [phone]

## 2024-06-03 ENCOUNTER — OFFICE VISIT (OUTPATIENT)
Dept: AUDIOLOGY | Facility: CLINIC | Age: 69
End: 2024-06-03
Payer: COMMERCIAL

## 2024-06-03 DIAGNOSIS — H90.3 SENSORINEURAL HEARING LOSS (SNHL) OF BOTH EARS: Primary | ICD-10-CM

## 2024-06-03 PROCEDURE — 92593 PR HEARING AID CHECK, BINAURAL: CPT | Performed by: AUDIOLOGIST

## 2024-06-03 NOTE — PROGRESS NOTES
AUDIOLOGY REPORT    BACKGROUND INFORMATION: Holly Muir was seen in the Audiology Clinic at [facility] on 6/3/2024 for follow-up.  The patient has been seen previously in this clinic on 3/08/2022 and results revealed a mild to moderate sensorineural hearing loss bilaterally.  The patient was fit with [employer]ak ACTONeo  hearing aids on 6/01/2022.  The patient reports good sound quality with the hearing aid(s).    TEST RESULTS AND PROCEDURES: An electroacoustic hearing aid check was performed.  Adjustments were made including replacing wax traps and cleaning and the patient reported good audibility. The hearing aid conformity evaluation was completed. This includes initially evaluating the devices electroacoustically and later matching NAL-NL2 target with soft sounds audible, moderate sounds comfortable and clear, and loud sounds below discomfort.     SUMMARY AND RECOMMENDATIONS: A hearing aid check was performed today and the patient reports good sound following cleaning.  Adjustments were made as noted above and the patient will return as needed or at least every 4-6 months for cleaning and assessment of hearing aid.  Call this clinic with questions regarding today s visit.    Cherie Powers.  Doctor of Audiology  [place] License # [id number]

## 2024-06-07 ENCOUNTER — VIRTUAL VISIT (OUTPATIENT)
Dept: PSYCHIATRY | Facility: CLINIC | Age: 69
End: 2024-06-07
Payer: COMMERCIAL

## 2024-06-07 VITALS — WEIGHT: 183 LBS | BODY MASS INDEX: 33.25 KG/M2

## 2024-06-07 DIAGNOSIS — F25.1 SCHIZOAFFECTIVE DISORDER, DEPRESSIVE TYPE (H): Primary | ICD-10-CM

## 2024-06-07 DIAGNOSIS — F41.1 GAD (GENERALIZED ANXIETY DISORDER): ICD-10-CM

## 2024-06-07 PROCEDURE — G2211 COMPLEX E/M VISIT ADD ON: HCPCS | Mod: 95 | Performed by: NURSE PRACTITIONER

## 2024-06-07 PROCEDURE — 99214 OFFICE O/P EST MOD 30 MIN: CPT | Mod: 95 | Performed by: NURSE PRACTITIONER

## 2024-06-07 RX ORDER — MIRTAZAPINE 7.5 MG/1
7.5 TABLET, FILM COATED ORAL AT BEDTIME
Qty: 30 TABLET | Refills: 3 | Status: SHIPPED | OUTPATIENT
Start: 2024-06-07 | End: 2024-09-09

## 2024-06-07 RX ORDER — BUSPIRONE HYDROCHLORIDE 30 MG/1
TABLET ORAL
Qty: 60 TABLET | Refills: 3 | Status: SHIPPED | OUTPATIENT
Start: 2024-06-07 | End: 2024-09-09

## 2024-06-07 RX ORDER — BUPROPION HYDROCHLORIDE 200 MG/1
200 TABLET, EXTENDED RELEASE ORAL
Qty: 30 TABLET | Refills: 3 | Status: SHIPPED | OUTPATIENT
Start: 2024-06-07 | End: 2024-09-09

## 2024-06-07 RX ORDER — PAROXETINE 40 MG/1
40 TABLET, FILM COATED ORAL EVERY EVENING
Qty: 30 TABLET | Refills: 3 | Status: SHIPPED | OUTPATIENT
Start: 2024-06-07 | End: 2024-09-09

## 2024-06-07 ASSESSMENT — PATIENT HEALTH QUESTIONNAIRE - PHQ9
SUM OF ALL RESPONSES TO PHQ QUESTIONS 1-9: 10
10. IF YOU CHECKED OFF ANY PROBLEMS, HOW DIFFICULT HAVE THESE PROBLEMS MADE IT FOR YOU TO DO YOUR WORK, TAKE CARE OF THINGS AT HOME, OR GET ALONG WITH OTHER PEOPLE: SOMEWHAT DIFFICULT
SUM OF ALL RESPONSES TO PHQ QUESTIONS 1-9: 10

## 2024-06-07 ASSESSMENT — PAIN SCALES - GENERAL: PAINLEVEL: NO PAIN (0)

## 2024-06-07 NOTE — PROGRESS NOTES
"Virtual Visit Details    Type of service:  Video Visit   Video Start Time: 1:53 PM  Video End Time: 2:20 PM    Originating Location (pt. Location): Home    Distant Location (provider location):  Off-site  Platform used for Video Visit: [facility]             Outpatient Psychiatric Progress Note    Name: Holly Muir   : 1955                    Primary Care Provider: Tiffany King MD PhD   Therapist: None    PHQ-9 scores:      2024    10:24 AM 2024     3:41 PM 2024     1:40 PM   PHQ-9 SCORE   PHQ-9 Total Score MyChart   10 (Moderate depression)   PHQ-9 Total Score 21 5 10       ISABELA-7 scores:      2023     9:49 AM 2023     9:43 AM 2024    10:26 AM   ISABELA-7 SCORE   Total Score 8 (mild anxiety) 20 (severe anxiety)    Total Score 8 20 20       Patient Identification:    Patient is a 69 year old year old, single  White Not  or  female  who presents for return visit with me.  Patient is currently unemployed. Patient attended the session alone. Patient prefers to be called: \" Holly\".    Current medications include:   Current Outpatient Medications   Medication Sig Dispense Refill    acetaminophen (TYLENOL) 325 MG tablet Take 2 tablets (650 mg) by mouth every 4 hours as needed for other (For optimal non-opioid multimodal pain management to improve pain control.) 100 tablet 0    ARIPiprazole (ABILIFY) 20 MG tablet Take 1 tablet (20 mg) by mouth every evening For 14 days then 1/2 tablet (10 mg) in the evening for 14 days then stop 30 tablet 0    blood glucose monitoring (NO BRAND SPECIFIED) meter device kit Use to test blood sugar 4  times daily or as directed. Accuceck Guide meter 1 kit 1    blood glucose monitoring (ONE TOUCH ULTRA 2) meter device kit USE TO TEST BLOOD SUGAR 4 TIMES DAILY OR AS DIRECTED. ACCUCECK GUIDE METER      blood glucose monitoring (ULTRA THIN 30G) lancets Use to test blood sugar 4  times daily or as directed.lancets for acchu check guide 400 each 3    " buPROPion (WELLBUTRIN SR) 200 MG 12 hr tablet Take 1 tablet (200 mg) by mouth daily (with breakfast) 30 tablet 3    busPIRone HCl (BUSPAR) 30 MG tablet TAKE 1 TABLET BY MOUTH TWICE A DAY 60 tablet 3    cariprazine (VRAYLAR) 1.5 MG capsule Take 1 capsule (1.5 mg) by mouth daily For one day then 2 capsules daily (3 mg) 60 capsule 0    cetirizine (ZYRTEC) 10 MG tablet Take 1 tablet (10 mg) by mouth daily 30 tablet 5    Continuous Blood Gluc  (FREESTYLE CM 2 READER) GALINA 1 Dose continuous 1 each 0    Continuous Blood Gluc Sensor (FREESTYLE CM 2 SENSOR) MISC Use one sensor every 14 days to read blood sugar 9 each 3    [facility] ASPIRIN ADULT LOW DOSE 81 MG chewable tablet TAKE 1 TABLET BY MOUTH EVERY DAY 90 tablet 1    diclofenac (VOLTAREN) 1 % topical gel Apply 4 g topically 4 times daily 350 g 3    ferrous gluconate (FERGON) 324 (38 Fe) MG tablet Take 1 tablet (324 mg) by mouth daily (with breakfast) 90 tablet 3    gabapentin (NEURONTIN) 600 MG tablet TAKE 2 TABLETS (1,200 MG) BY MOUTH 3 TIMES DAILY FOR 30 DAYS 180 tablet 5    glimepiride (AMARYL) 2 MG tablet Take 2 tablets (4 mg) by mouth 2 times daily (before meals) 360 tablet 3    insulin glargine (LANTUS SOLOSTAR) 100 UNIT/ML pen Inject 20 units at bedtime as directed + 2 units for priming of pen.      insulin pen needle (31G X 5 MM) 31G X 5 MM miscellaneous Use four times 4 day times a day 200 each 3    metFORMIN (GLUCOPHAGE XR) 500 MG 24 hr tablet Take 2 tablets (1,000 mg) by mouth 2 times daily (with meals) Take two tablets with breakfast and one tablet with dinner for one week then increase to two tablets with breakfast and two tablets with dinner thereafter 360 tablet 3    mirtazapine (REMERON) 7.5 MG tablet Take 1 tablet (7.5 mg) by mouth at bedtime 30 tablet 3    Multiple Vitamin (DAILY-ISAAC MULTIVITAMIN) TABS TAKE 1 TABLET BY MOUTH EVERY DAY 90 tablet 0    nystatin (MYCOSTATIN) 500204 UNIT/GM external powder Apply 1 dose topically 3 times daily as  "needed 60 g 3    omeprazole (PRILOSEC) 20 MG DR capsule TAKE 2 CAPSULES (40 MG) BY MOUTH DAILY *TAKE 30-60 MINUTES BEFORE A MEAL* 180 capsule 2    ONETOUCH ULTRA test strip USE TO TEST BLOOD SUGAR 4 TIMES DAILY OR AS DIRECTED. ACCU CHECK GUIDE STRIPS 400 strip 3    PARoxetine (PAXIL) 40 MG tablet Take 1 tablet (40 mg) by mouth every evening 30 tablet 3    pioglitazone (ACTOS) 45 MG tablet Take 1 tablet (45 mg) by mouth daily 90 tablet 1    propranolol ER (INDERAL LA) 80 MG 24 hr capsule TAKE 1 CAPSULE BY MOUTH EVERY DAY 90 capsule 3    ramipril (ALTACE) 5 MG capsule TAKE 1 CAPSULE BY MOUTH EVERY DAY 90 capsule 1    rOPINIRole (REQUIP) 0.25 MG tablet TAKE 1 TABLET (0.25 MG) BY MOUTH EVERY EVENING 90 tablet 0    Semaglutide, 2 MG/DOSE, (OZEMPIC) 8 MG/3ML pen Inject 2 mg Subcutaneous every 7 days 9 mL 3    simvastatin (ZOCOR) 20 MG tablet TAKE 1 TABLET BY MOUTH EVERYDAY AT BEDTIME 90 tablet 1    tiZANidine (ZANAFLEX) 2 MG tablet TAKE 1 TABLET BY MOUTH THREE TIMES A DAY AS NEEDED FOR MUSCLE SPASM 30 tablet 0    vitamin C (CVS VITAMIN C) 500 MG tablet 1/2 TABLET BY MOUTH JACOBSON WITH IRON SUPPLEMENT BEFORE MEAL 45 tablet 3    VITAMIN D3 25 MCG (1000 UT) tablet TAKE 1 TABLET BY MOUTH EVERY DAY 90 tablet 3     Current Facility-Administered Medications   Medication Dose Route Frequency Provider Last Rate Last Admin    triamcinolone (KENALOG-40) injection 40 mg  40 mg   Santy Harrington, DO   40 mg at 01/08/24 1612        The [place] Prescription Monitoring Program has been reviewed and there are no concerns about diversionary activity for controlled substances at this time.      I was able to review most recent Primary Care Provider, specialty provider, and therapy visit notes that I have access to.     Today, patient reports feeling down about father's day is coming  up.  She is like she is in a \"rut\" and questioned what she is here for.  Had to go to ED - felt dizzy and nausea.  Treated for UTI for 7 days.  [facility] to [facility] center [facility] - helped improve her mood..  She feels less anxious over the past few days.  She prays.  Pain in legs.   Uses cane. Fell once.  Less visual hallucinations - Forgets where she puts things.  Sees a therapist 2 weeks.     has a past medical history of Bilateral knee pain, Cataracts, both eyes (5/8/2013), Cervical cancer (H), Chronic kidney disease, stage 3a (H) (11/17/2021), Diabetes, Diabetic retinopathy (H), HTN, Inflammatory arthritis, Major depression, Memory loss, Nephropathy, OA (osteoarthritis) of knee (9/11/2013), Other and unspecified hyperlipidemia, Pterygium eye, and Sacral nerve root injury.    She has no past medical history of Amblyopia, Bleeding disorder (H24), Glaucoma, Glaucoma (increased eye pressure), Heart disease, Macular degeneration, Retinal detachment, Senile macular degeneration, Sleep apnea, Strabismus, Stroke (H), Surgical complications, Unspecified asthma(493.90), or Uveitis.    Social history updates:    She continues to live with her family.    Substance use updates:    No alcohol use reported  Tobacco use: No    Vital Signs:   Wt 83 kg (183 lb)   BMI 33.25 kg/m      Labs:    Most recent laboratory results reviewed and no new labs.     Mental Status Examination:  Appearance: awake, alert and casual dress  Attitude: cooperative  Eye Contact:  adequate  Gait and Station: Dizziness  Psychomotor Behavior:  intact station, gait and muscle tone  Oriented to:  time, person, and place  Attention Span and Concentration:  Normal  Speech:   vtspeech: clear, coherent and Speaks: English  Mood:  anxious and depressed  Affect:  mood congruent  Associations:  no loose associations  Thought Process:  goal oriented  Thought Content:  no evidence of suicidal ideation or homicidal ideation, no auditory hallucinations present, and no visual hallucinations present  Recent and Remote Memory:  intact Not formally assessed. No amnesia.  Fund of Knowledge: appropriate  Insight:  good  Judgment:  good  Impulse Control:  good    Suicide Risk Assessment:  Today Holly Muir reports no thoughts to harm themself or others. In addition, there are notable risk factors for self-harm, including anxiety, psychosis, and mood change. However, risk is mitigated by commitment to family, history of seeking help when needed, future oriented, denies suicidal intent or plan, and denies homicidal ideation, intent, or plan. Therefore, based on all available evidence including the factors cited above, Holly Muir does not appear to be at imminent risk for self-harm, does not meet criteria for a 72-hr hold, and therefore remains appropriate for ongoing outpatient level of care.  A thorough assessment of risk factors related to suicide and self-harm have been reviewed and are noted above. The patient convincingly denies suicidality on several occasions. Local community safety resources printed and reviewed for patient to use if needed. There was no deceit detected, and the patient presented in a manner that was believable.     DSM5 Diagnosis:  295.70  (F25.1) Schizoaffective Disorder Depressive Type  300.02 (F41.1) Generalized Anxiety Disorder    Medical comorbidities include:   Patient Active Problem List    Diagnosis Date Noted    S/P total knee arthroplasty, right 11/03/2023     Priority: Medium    Osteoarthritis of right knee, unspecified osteoarthritis type 10/02/2023     Priority: Medium    Noncompliance with medication regimen 09/07/2023     Priority: Medium     overuse of oxycodone, not taking gabapentin as scheduled.      Iron deficiency anemia secondary to inadequate dietary iron intake 08/04/2023     Priority: Medium    Pneumonia 05/07/2023     Priority: Medium    Chronic right shoulder pain 02/17/2023     Priority: Medium    Chronic kidney disease, stage 3a (H) 11/17/2021     Priority: Medium    Restless leg syndrome 09/22/2021     Priority: Medium    Tremor 09/22/2021     Priority: Medium    Fecal urgency  05/20/2020     Priority: Medium     Added automatically from request for surgery [id number]      Pseudophakia of right eye 10/25/2019     Priority: Medium    Anal stenosis 06/27/2019     Priority: Medium    S/P partial colectomy 06/27/2019     Priority: Medium    GERD (gastroesophageal reflux disease) 01/23/2019     Priority: Medium    Anemia 01/22/2019     Priority: Medium    Chest pain, musculoskeletal 01/22/2019     Priority: Medium    Diabetic neuropathy (H) 01/22/2019     Priority: Medium    Hyponatremia 01/22/2019     Priority: Medium    Schizoaffective disorder (H) 01/22/2019     Priority: Medium    Obesity (BMI 35.0-39.9) with comorbidity (H) 11/14/2018     Priority: Medium    Visual hallucination 04/21/2016     Priority: Medium    Type 2 diabetes mellitus with hyperglycemia, with long-term current use of insulin (H) 12/23/2015     Priority: Medium    Fecal incontinence due to anorectal disorder 09/28/2015     Priority: Medium    Anxiety 09/02/2015     Priority: Medium    Bilateral low back pain without sciatica 06/17/2015     Priority: Medium    Urinary retention 01/22/2015     Priority: Medium    Colostomy, evaluate (H) 10/17/2014     Priority: Medium    Encounter for long-term (current) use of insulin (H) 10/12/2014     Priority: Medium    Hypoglycemia due to insulin 09/17/2014     Priority: Medium    Rotator cuff impingement syndrome 08/07/2014     Priority: Medium    Health Care Home 09/20/2013     Priority: Medium     Date:  3-4-16  Status:  Accepted          Cataracts, both eyes 05/08/2013     Priority: Medium    Dermatochalasis 05/10/2012     Priority: Medium    Presbyopia 05/10/2012     Priority: Medium    OCD (obsessive compulsive disorder) 05/10/2012     Priority: Medium    HTN, goal below 140/90 05/10/2012     Priority: Medium    Pain in joint, hand 01/25/2012     Priority: Medium    Trigger finger, acquired 01/25/2012     Priority: Medium     Problem list name updated by automated process. Provider  to review      Synovitis and tenosynovitis 01/25/2012     Priority: Medium     Problem list name updated by automated process. Provider to review      Mixed incontinence urge and stress 06/01/2010     Priority: Medium    Memory loss 06/01/2010     Priority: Medium     Diabetes coma around 2003      S/P colostomy (H) 04/03/2010     Priority: Medium     Due to injury to colon while she had a hysterectomy (age late 30s):  Seen Dr. Doherty in colorectal surgery at [facility]-does not want to operated. Needs her social support and memory loss issues be resolved first.       History of cervical cancer 09/23/2009     Priority: Medium     status post NATALIE/BSO. Being followed by Dr. Angelita Addison at [facility] gyn/onc      Severe major depression with psychotic features (H) 07/31/2009     Priority: Medium     Psychiatrist: Dr. Perkins at [facility].   Neuropsychological evalation at [facility] on 12/1/09: lower end cognitive functioning and multifactorial in etiologies including effects of psychosis, medications nd perhaps non-restorative sleep. Also has features of OCD. Recommend psychiatry referral for further evaluation of past Dx of schizophrenia and also psychotherapy.   Sees a therapist monthly now (has met goals) but will monitor for relapse       Hyperlipidemia LDL goal <100 01/27/2009     Priority: Medium    Primary generalized (osteo)arthritis 01/29/2014     Priority: Low       Assessment:    Holly Muir is feeling like she is not living her life to the fullest.  She is urged to continue talk therapy to learn skills to manage her depression symptoms.  Paroxetine and mirtazapine will continue to address the depression symptoms in addition to the bupropion.  Buspirone is effective in managing anxiety for her.  Since taking the Vraylar instead of the Abilify, she is reporting less visual hallucinations and it will continue..    Medication side effects and  alternatives were reviewed. Health promotion activities recommended and reviewed today. All questions addressed. Education and counseling completed regarding risks and benefits of medications and psychotherapy options.    Treatment Plan:      1.  Bupropion  mg daily with breakfast  2.  Buspirone 30 mg 2 times daily  3.  Vraylar 4.5 mg daily  4.  Abilify discontinued  5.  Mirtazapine 7.5 mg at bedtime  6.  Paroxetine 40 mg in the evening  7.  Continue talk therapy    Continue all other medications as reviewed per electronic medical record today.   Safety plan reviewed. To the Emergency Department as needed or call after hours crisis line at [phone] or [phone]. [place] Crisis Text Line. Text [place] to 971020 or Suicide LifeLine Chat: suicidepreventionlifeline.org/chat/  To schedule individual or family therapy, call [facility] at [phone]  Schedule an appointment with me in 6 weeks or sooner as needed. Call [facility] at [phone] to schedule.  Follow up with primary care provider as planned or for acute medical concerns.  Call the psychiatric nurse line with medication questions or concerns at [phone]  Senichart may be used to communicate with your provider, but this is not intended to be used for emergencies.        Crisis Resources   The [facility] is an adults only unit located at [facility] is a short term (generally less than 23 hour stay) designed for crisis intervention and stabilization. Pts have the opportunity to meet quickly with a behavioral health team for evaluation in a calm and peaceful therapuetic environment. To be evaluated for admission pts are triaged throught the [facility] ED.      The following hotlines are for both adults and children. The and are open 24 hours a day, 7 days a week unless noted otherwise.        Crisis Lines      Crisis Text Line  Text 078097  You will be connected with a trained live crisis counselor to  provide support.        Gambling Hotline  6.252.621.2432 [hope]        línea de crisis española  820.751.2948        [facility] Helpline  [phone]        National Hope Line  7.256.659.3414 [hope]        National Suicide Prevention Lifeline  Free and confidential support  988 or 1.557.402.TALK [3855]  http://suicidepreventionlifeline.org        The [employer] Project (LGBTQ Youth Crisis Line)  0.124.680.8713  text START to 298-545        's Crisis Line  1.301.758.2718 (Press 1)  or text 520867    [facility] Mental Health Crisis Response  Within [place], call **CRISIS [**298357] to be connected to a mental health professional who can assist you.        [facility] Crisis  [phone]      [facility] Mobile Crisis  [phone]      [facility] Crisis  [phone]      [facility] Mobile Crisis  [phone] (adults)  [phone] (children)      [facility] Mobile Crisis  [phone] (adults)  [phone] (children)      [facility] Mobile Crisis  [phone]      [facility] Mobile Crisis  [phone]    Community Resources      Fast Tracker  Linking people to mental health and substance use disorder resources  fasttrackermn.org        [place] Mental Health Warmline  Peer to peer support  5 pm to 9 am 7 days/week  9.590.187.7738  https://mnwitw.org/ashwini        National [place] on Mental Illness (SOBEIDA)  811.556.7728 or 1.888.SOBEIDA.HELPS  https://namimn.org/        [facility] for Adult Mental Health  [facility] residents [address]  [phone]        [facility]  Free mental health counseling  https://walkin.org/  612.870.0565 X2    Mental Health Apps      Calm Harm  https://calmharm.co.uk/      My3  https://my3app.org/      [place] Safety Plan  https://www.mysafetyplan.org/   Administrative Billing:   Time spent with patient includes counseling and coordination of  care regarding above diagnoses and treatment plan.    The longitudinal plan of care for the diagnosis(es)/condition(s) as documented were addressed during this visit. Due to the added complexity in care, I will continue to support Holly in the subsequent management and with ongoing continuity of care.    Patient Status:  This is a continuous care patient and medications will be prescribed by the psychiatric provider until further indicated.    Signed:   Stacey Serrano [facility]   Psychiatry       Answers submitted by the patient for this visit:  Patient Health Questionnaire (Submitted on 6/7/2024)  If you checked off any problems, how difficult have these problems made it for you to do your work, take care of things at home, or get along with other people?: Somewhat difficult  PHQ9 TOTAL SCORE: 10

## 2024-06-11 DIAGNOSIS — Z79.4 TYPE 2 DIABETES MELLITUS WITHOUT COMPLICATION, WITH LONG-TERM CURRENT USE OF INSULIN (H): Primary | ICD-10-CM

## 2024-06-11 DIAGNOSIS — E88.819 INSULIN RESISTANCE: Primary | ICD-10-CM

## 2024-06-11 DIAGNOSIS — R25.1 TREMOR: ICD-10-CM

## 2024-06-11 DIAGNOSIS — E11.9 TYPE 2 DIABETES MELLITUS WITHOUT COMPLICATION, WITH LONG-TERM CURRENT USE OF INSULIN (H): Primary | ICD-10-CM

## 2024-06-11 RX ORDER — PROPRANOLOL HYDROCHLORIDE 80 MG/1
CAPSULE, EXTENDED RELEASE ORAL
Qty: 90 CAPSULE | Refills: 3 | OUTPATIENT
Start: 2024-06-11

## 2024-06-12 ENCOUNTER — TELEPHONE (OUTPATIENT)
Dept: FAMILY MEDICINE | Facility: CLINIC | Age: 69
End: 2024-06-12
Payer: COMMERCIAL

## 2024-06-12 NOTE — TELEPHONE ENCOUNTER
Forms/Letter Request    Type of form/letter:     Do we have the form/letter: Yes: Will place in providers bin     Who is the form from? [facility]    Where did/will the form come from? form was faxed in    When is form/letter needed by: asap    How would you like the form/letter returned: Fax : [phone]

## 2024-06-13 ENCOUNTER — TELEPHONE (OUTPATIENT)
Dept: PSYCHIATRY | Facility: CLINIC | Age: 69
End: 2024-06-13
Payer: COMMERCIAL

## 2024-06-13 DIAGNOSIS — E11.9 TYPE 2 DIABETES MELLITUS WITHOUT COMPLICATION, WITH LONG-TERM CURRENT USE OF INSULIN (H): Primary | ICD-10-CM

## 2024-06-13 DIAGNOSIS — Z79.4 TYPE 2 DIABETES MELLITUS WITHOUT COMPLICATION, WITH LONG-TERM CURRENT USE OF INSULIN (H): Primary | ICD-10-CM

## 2024-06-13 NOTE — TELEPHONE ENCOUNTER
Received 90 day refill request for Vraylar 1.5 mg. Patient is no longer on this dose. She is taking Vraylar 4.5 mg which was sent on 6/7/24.     Addie Hutton RN on 6/13/2024 at 10:00 AM

## 2024-06-13 NOTE — TELEPHONE ENCOUNTER
1) Reviewed a 2nd refill request(s) from    [facility]Y #[id number] - [place], [place] - [address] AT [address] [place]    2) Any Controlled Substance(s)? No    3) Refill(s) requested for:   - cariprazine (VRAYLAR) 1.5 mg tablet Date last ordered: 4/11/2024 Qty: 60 Refills: 0  Take 1 capsule (1.5 mg) by mouth daily For one day then 2 capsules daily (3 mg)         4) Action taken: Refill request(s) sent back to pharmacy as DENIED  This order was discontinued on 6/7/2024      Jessica Toney, RN on 6/13/2024 at 2:29 PM

## 2024-06-14 DIAGNOSIS — H90.3 SENSORINEURAL HEARING LOSS (SNHL) OF BOTH EARS: Primary | ICD-10-CM

## 2024-06-14 PROCEDURE — V5266 BATTERY FOR HEARING DEVICE: HCPCS | Performed by: AUDIOLOGIST

## 2024-06-14 NOTE — PROGRESS NOTES
At the patient's request, a 90-day supply of hearing device batteries was mailed to her.     Cherie Powers.  Doctor of Audiology  [place] License # [id number]

## 2024-06-14 NOTE — TELEPHONE ENCOUNTER
Form completed. Fax back with the office visit note from 9/15/2023 (preop)  Pt will be due for annual wellness visit. Please schedule for September

## 2024-06-17 NOTE — TELEPHONE ENCOUNTER
Assessment/Plan:    Assessment & Plan   Khushi was seen today for follow up.    Diagnoses and all orders for this visit:    Calculus of ureter    Calculus of kidney  -     Patient Stated Goal: Know what to expect after surgery  -     Case Request: CYSTOURETEROSCOPY, WITH LASER LITHOTRIPSY, CALCULUS REMOVAL AND URETERAL STENT INSERTION; Standing  -     Case Request: CYSTOURETEROSCOPY, WITH LASER LITHOTRIPSY, CALCULUS REMOVAL AND URETERAL STENT INSERTION    Other orders  -     Forced Air Warming Device; Standing  -     Notify Provider - Anticoagulants and Antiplatelets; Standing  -     Glucose monitor nursing POCT; Standing  -     NPO per Anesthesia Guidelines for Procedure/Surgery Except for: Meds; Standing  -     Apply Pneumatic Compression Device (PCD); Standing  -     Pneumatic Compression Device (PCD) (Equipment); Standing  -     ceFAZolin (ANCEF) 2 g in sodium chloride 0.9 % 100 mL intermittent infusion  -     ceFAZolin (ANCEF) 2 g in sodium chloride 0.9 % 100 mL intermittent infusion  -     gabapentin (NEURONTIN) capsule 300 mg  -     ketorolac (TORADOL) injection 15 mg  -     acetaminophen (TYLENOL) tablet 1,000 mg  -     XR Surgery MEENA Fluoro Less Than 5 Min; Standing  -     XR Surgery MEENA Fluoro Less Than 5 Min        Stone Management Plan  Stone Management 11/3/2022 11/10/2022   Urinary Tract Infection - No suspicion of infection   Renal Colic - Well controlled symptoms   Renal Failure - No suspicion of renal failure   Current CT date 10/31/2022 11/9/2022   Right sided stones? Yes Yes   R Number of ureteral stones 1 1   R GSD of ureteral stones 5 4   R Location of ureteral stone Distal Distal   R Number of kidney stones  1 1   R GSD of kidney stones 10 - 15 4 - 10   R Hydronephrosis None None   R Stone Event New event Established event   Diagnosis date 10/31/2022 -   Initial location of primary symptomatic stone Distal -   Initial GSD of primary symptomatic stone 5 -   R MET status - Progression   R  Faxed complete form, 9/15/23 physical and Med list was sent via [phone]    Could not leave VM message. Sent [employer] message - Need to schedule annual visit 9/16 or later.    Current Plan MET Clear   MET 1 week F/U -   Clear rationale - Associated treated infection   Left sided stones? Yes Yes   L Number of ureteral stones No ureteral stones No ureteral stones   L Number of kidney stones  2 Renal stones unchanged from last exam   L GSD of kidney stones 4 - 10 -   L Hydronephrosis None -   L Stone Event No current event No current event   L Current Plan Observe -   Observe rationale Limited stone burden with good prognosis for spontaneous passage -             PLAN    Video call duration: 10 minutes  15 minutes spent on the date of the encounter doing chart review, history and exam, documentation and further activities per the note    MARIA DEL CARMEN MOSHER MD  [facility]    HPI  Ms. Khushi Sawant is a 58 year old  female who is being evaluated via a billable video visit by [facility] for medical expulsive therapy follow up.     On last encounter, her 4 mm stone was in right distal ureter with Mild hydronephrosis. She has had no unanticipated events.    Symptoms have been minimal . She is asymptomatic at present. She denies symptoms of fever, chills, flank pain, nausea, vomiting, urinary frequency and dysuria.     New CT scan was personally reviewed and demonstrates progression of stone to extreme distal right ureter (possibly in bladder) with no hydronephrosis.     I suspect that the right distal stone will pass if it has not passd already.    She should have the large right renal stone cleared on a scheduled basis in the not too distant future. Possibly associated with proteus infection. Most recent voided urine no growth.    Discussed surgical expectations with ureteroscopy. Will schedule soon..    ROS   Review of systems is negative except for HPI.    No past medical history on file.    Past Surgical History:   Procedure Laterality Date      SECTION       COLONOSCOPY N/A 2015    Procedure:  COLONOSCOPY w/ placement of hemoclip x2;  Surgeon: Nena Anaya MD;  Location: [facility];  Service:      HYSTERECTOMY         Current Outpatient Medications   Medication Sig Dispense Refill     HYDROcodone-acetaminophen (NORCO) 5-325 MG tablet Take 1 tablet by mouth every 6 hours as needed for severe pain 10 tablet 0       Allergies   Allergen Reactions     Effexor [Venlafaxine] Anxiety       Social History     Socioeconomic History     Marital status:      Spouse name: Not on file     Number of children: Not on file     Years of education: Not on file     Highest education level: Not on file   Occupational History     Not on file   Tobacco Use     Smoking status: Never     Smokeless tobacco: Never   Vaping Use     Vaping Use: Never used   Substance and Sexual Activity     Alcohol use: Yes     Comment: couple glasses of wine a week     Drug use: No     Sexual activity: Yes     Partners: Male     Birth control/protection: Female Surgical   Other Topics Concern     Parent/sibling w/ CABG, MI or angioplasty before 65F 55M? No   Social History Narrative     Not on file     Social Determinants of Health     Financial Resource Strain: Not on file   Food Insecurity: Not on file   Transportation Needs: Not on file   Physical Activity: Not on file   Stress: Not on file   Social Connections: Not on file   Intimate Partner Violence: Not on file   Housing Stability: Not on file       Family History   Problem Relation Age of Onset     Family History Negative Mother      Family History Negative Father        Objective:     Appears AAO x 3  No vitals obtained due to virtual visit    Labs   Most Recent 3 CBC's:Recent Labs   Lab Test 10/31/22  1647 01/12/17  1043   WBC 6.8 5.1   HGB 15.1 15.5   MCV 99 94    173     Most Recent 3 BMP's:Recent Labs   Lab Test 10/31/22  1647 02/24/17  0841    140   POTASSIUM 4.1 3.8   CHLORIDE 101 104   CO2 26 27   BUN 16.5 21   CR 0.66 0.66   ANIONGAP 12 9   VIKTORIYA 9.9  9.3   * 109*     7-Day Micro Results     Collected Updated Procedure Result Status      11/08/2022 0919 11/09/2022 1332 Urine Culture Aerobic Bacterial [CTM358] [[id number]]   Urine, NOS    Final result Component Value   Culture No Growth                   Most Recent Urinalysis:Recent Labs   Lab Test 11/08/22  0919   COLOR Yellow   APPEARANCE Clear   URINEGLC Negative   URINEBILI Negative   URINEKETONE 15*   SG 1.010   UBLD Moderate*   URINEPH 6.0   PROTEIN Negative   UROBILINOGEN 0.2   NITRITE Negative   LEUKEST Trace*   RBCU 0-2   WBCU 5-10*     Acute Labs   Urine Culture    Culture   Date Value Ref Range Status   11/08/2022 No Growth  Final   10/31/2022 10,000-50,000 CFU/mL Mixture of urogenital albino  Final   10/18/2022 >100,000 CFU/mL Proteus mirabilis (A)  Final

## 2024-06-18 NOTE — TELEPHONE ENCOUNTER
PATRICK received faxed refill request for cariprazine (VRAYLAR) 4.5 MG capsule     Too soon for refill   Can refill  in October  Kary Cunningham CMA June 18, 2024

## 2024-06-20 RX ORDER — GLIMEPIRIDE 2 MG/1
4 TABLET ORAL
Qty: 360 TABLET | Refills: 3 | Status: SHIPPED | OUTPATIENT
Start: 2024-06-20 | End: 2024-09-17

## 2024-06-20 RX ORDER — PIOGLITAZONEHYDROCHLORIDE 45 MG/1
45 TABLET ORAL DAILY
Qty: 90 TABLET | Refills: 1 | Status: SHIPPED | OUTPATIENT
Start: 2024-06-20

## 2024-06-24 ENCOUNTER — VIRTUAL VISIT (OUTPATIENT)
Dept: PHARMACY | Facility: CLINIC | Age: 69
End: 2024-06-24
Payer: COMMERCIAL

## 2024-06-24 DIAGNOSIS — G25.81 RESTLESS LEG SYNDROME: ICD-10-CM

## 2024-06-24 DIAGNOSIS — Z79.4 TYPE 2 DIABETES MELLITUS WITHOUT COMPLICATION, WITH LONG-TERM CURRENT USE OF INSULIN (H): ICD-10-CM

## 2024-06-24 DIAGNOSIS — E11.9 TYPE 2 DIABETES MELLITUS WITHOUT COMPLICATION, WITH LONG-TERM CURRENT USE OF INSULIN (H): ICD-10-CM

## 2024-06-24 PROCEDURE — 99606 MTMS BY PHARM EST 15 MIN: CPT | Mod: 93 | Performed by: PHARMACIST

## 2024-06-24 RX ORDER — ROPINIROLE 0.25 MG/1
0.25 TABLET, FILM COATED ORAL EVERY EVENING
Qty: 90 TABLET | Refills: 0 | Status: SHIPPED | OUTPATIENT
Start: 2024-06-24

## 2024-06-24 NOTE — PATIENT INSTRUCTIONS
"Recommendations from today's MTM visit:                                                         New prescription sent in for Freestyle sensors. Recommend to call [employer] customer service if you have any issues getting her phone connected.    Follow-up: 1 month    It was great speaking with you today.  I value your experience and would be very thankful for your time in providing feedback in our clinic survey. In the next few days, you may receive an email or text message from [facility] [employer] with a link to a survey related to your  clinical pharmacist.\"     To schedule another MTM appointment, please call the clinic directly or you may call the MTM scheduling line at [phone] or toll-free at [phone].     My Clinical Pharmacist's contact information:                                                      Please feel free to contact me with any questions or concerns you have.      Liz Calero, Pharm.D, [facility]  Medication Therapy Management Pharmacist      "

## 2024-07-01 DIAGNOSIS — Z79.4 TYPE 2 DIABETES MELLITUS WITHOUT COMPLICATION, WITH LONG-TERM CURRENT USE OF INSULIN (H): ICD-10-CM

## 2024-07-01 DIAGNOSIS — E11.9 TYPE 2 DIABETES MELLITUS WITHOUT COMPLICATION, WITH LONG-TERM CURRENT USE OF INSULIN (H): ICD-10-CM

## 2024-07-01 DIAGNOSIS — R25.1 TREMOR: ICD-10-CM

## 2024-07-01 RX ORDER — PROPRANOLOL HYDROCHLORIDE 80 MG/1
CAPSULE, EXTENDED RELEASE ORAL
Qty: 90 CAPSULE | Refills: 2 | Status: SHIPPED | OUTPATIENT
Start: 2024-07-01

## 2024-07-02 RX ORDER — GLIMEPIRIDE 2 MG/1
TABLET ORAL
Qty: 90 TABLET | Refills: 3 | OUTPATIENT
Start: 2024-07-02

## 2024-07-03 ENCOUNTER — LAB (OUTPATIENT)
Dept: LAB | Facility: CLINIC | Age: 69
End: 2024-07-03
Payer: COMMERCIAL

## 2024-07-03 DIAGNOSIS — Z79.4 TYPE 2 DIABETES MELLITUS WITHOUT COMPLICATION, WITH LONG-TERM CURRENT USE OF INSULIN (H): ICD-10-CM

## 2024-07-03 DIAGNOSIS — E11.9 TYPE 2 DIABETES MELLITUS WITHOUT COMPLICATION, WITH LONG-TERM CURRENT USE OF INSULIN (H): ICD-10-CM

## 2024-07-03 LAB
ANION GAP SERPL CALCULATED.3IONS-SCNC: 10 MMOL/L (ref 7–15)
BUN SERPL-MCNC: 10.5 MG/DL (ref 8–23)
CALCIUM SERPL-MCNC: 9.9 MG/DL (ref 8.8–10.2)
CHLORIDE SERPL-SCNC: 101 MMOL/L (ref 98–107)
CREAT SERPL-MCNC: 0.83 MG/DL (ref 0.51–0.95)
DEPRECATED HCO3 PLAS-SCNC: 27 MMOL/L (ref 22–29)
EGFRCR SERPLBLD CKD-EPI 2021: 76 ML/MIN/1.73M2
GLUCOSE SERPL-MCNC: 187 MG/DL (ref 70–99)
POTASSIUM SERPL-SCNC: 4.8 MMOL/L (ref 3.4–5.3)
SODIUM SERPL-SCNC: 138 MMOL/L (ref 135–145)

## 2024-07-03 PROCEDURE — 36415 COLL VENOUS BLD VENIPUNCTURE: CPT

## 2024-07-03 PROCEDURE — 80048 BASIC METABOLIC PNL TOTAL CA: CPT

## 2024-07-05 DIAGNOSIS — Z79.4 TYPE 2 DIABETES MELLITUS WITHOUT COMPLICATION, WITH LONG-TERM CURRENT USE OF INSULIN (H): ICD-10-CM

## 2024-07-05 DIAGNOSIS — E11.9 TYPE 2 DIABETES MELLITUS WITHOUT COMPLICATION, WITH LONG-TERM CURRENT USE OF INSULIN (H): ICD-10-CM

## 2024-07-08 NOTE — TELEPHONE ENCOUNTER
"Refill request r'cd from [facility] via fax for Vraylar 1.5 mg denied. Faxed \" not authorized\"  dt this dosage has been discontinued. See new Rx dated 6/7/24 for 4.5 mg capsules      Sig - Route: Take 1 capsule (4.5 mg) by mouth daily - Oral   Sent to pharmacy as: Cariprazine HCl 4.5 MG Oral Capsule (VRAYLAR)   Class: E-Prescribe       Treatment Plan: 6/7/24        1.  Bupropion  mg daily with breakfast  2.  Buspirone 30 mg 2 times daily  3.  Vraylar 4.5 mg daily  4.  Abilify discontinued  5.  Mirtazapine 7.5 mg at bedtime  6.  Paroxetine 40 mg in the evening  7.  Continue talk therapy           "

## 2024-07-10 RX ORDER — GLIMEPIRIDE 2 MG/1
TABLET ORAL
Qty: 90 TABLET | Refills: 3 | OUTPATIENT
Start: 2024-07-10

## 2024-07-10 NOTE — TELEPHONE ENCOUNTER
glimepiride (AMARYL) 2 MG tablet 360 tablet 3 6/20/2024 -- No   Sig - Route: Take 2 tablets (4 mg) by mouth 2 times daily (before meals) - Oral

## 2024-08-01 ENCOUNTER — PATIENT OUTREACH (OUTPATIENT)
Dept: GERIATRIC MEDICINE | Facility: CLINIC | Age: 69
End: 2024-08-01
Payer: COMMERCIAL

## 2024-08-01 NOTE — PROGRESS NOTES
[facility] Care Coordination Contact      [facility] Six-Month Telephone Assessment    6 month telephone assessment completed on 08/01/24.    ER visits: No  Hospitalizations: No  TCU stays: No  Significant health status changes: None  Falls/Injuries: Yes: reports several falls in the home but no injury reported.   ADL/IADL changes: No  Changes in services: No    Caregiver Assessment follow up:  NA    Goals: See POC in chart for goal progress documentation.      Will see member in 6 months for an annual health risk assessment.   Encouraged member to call CC with any questions or concerns in the meantime.       Malathi Bacon RN PHN  Care Coordinator  [facility]  O: [phone]  C:[phone]  F:[phone]

## 2024-08-12 ENCOUNTER — VIRTUAL VISIT (OUTPATIENT)
Dept: PHARMACY | Facility: CLINIC | Age: 69
End: 2024-08-12
Payer: COMMERCIAL

## 2024-08-12 DIAGNOSIS — F42.9 OBSESSIVE-COMPULSIVE DISORDER, UNSPECIFIED TYPE: ICD-10-CM

## 2024-08-12 DIAGNOSIS — F32.3 SEVERE MAJOR DEPRESSION WITH PSYCHOTIC FEATURES (H): ICD-10-CM

## 2024-08-12 DIAGNOSIS — E11.9 TYPE 2 DIABETES MELLITUS WITHOUT COMPLICATION, WITH LONG-TERM CURRENT USE OF INSULIN (H): Primary | ICD-10-CM

## 2024-08-12 DIAGNOSIS — F25.9 SCHIZOAFFECTIVE DISORDER, UNSPECIFIED TYPE (H): ICD-10-CM

## 2024-08-12 DIAGNOSIS — Z79.4 TYPE 2 DIABETES MELLITUS WITHOUT COMPLICATION, WITH LONG-TERM CURRENT USE OF INSULIN (H): Primary | ICD-10-CM

## 2024-08-12 PROCEDURE — 99606 MTMS BY PHARM EST 15 MIN: CPT | Mod: 93 | Performed by: PHARMACIST

## 2024-08-12 PROCEDURE — 99607 MTMS BY PHARM ADDL 15 MIN: CPT | Mod: 93 | Performed by: PHARMACIST

## 2024-08-12 NOTE — PROGRESS NOTES
Medication Therapy Management (MTM) Encounter    ASSESSMENT:                            Medication Adherence/Access: See below for considerations    Diabetes:   Patient is not meeting A1c goal of < 7%.In meeting in target range of >70%. Has not noticed any weight loss on Ozempic. May benefit from changing Ozempic to Mounjaro.     Depression/OCD/Schizoeffective Disorder:  Would benefit from restarting mirtazapine, which was never meant to be discontinued at the time of Vraylar start. Also recommend following up with psychiatry regarding side effect from Vraylar.    PLAN:                          Recommend Mounjaro 5mg weekly  Recommend restarting mirtazapine  Recommend following up with psychiatry regarding side effects from vraylar    Follow-up: 1 month    SUBJECTIVE/OBJECTIVE:                          Holly Muir is a 69 year old female called for a follow-up visit.  Today's visit is a follow-up MTM visit from 6/24/2024.     Reason for visit:  blood sugars    Allergies/ADRs: Reviewed in chart  Past Medical History: Reviewed in chart  Tobacco: She reports that she quit smoking about 47 years ago. Her smoking use included cigarettes. She started smoking about 48 years ago. She has never used smokeless tobacco.  Alcohol: not currently using  Lives between two daughters.     Medication Adherence/Access: daughters help manage medications. Uses a pill box.   Per patient, daughters hand her her medications to her.  Reports not missing any doses  Has timer on her phone to remind her when to take her insulin.  [facility]    Diabetes   Type 2 Diabetes:    Lantus 20 units daily  Ozempic 2mg weekly Mondays  Actos 45mg daily  Glimepiride 4mg twice daily  Metformin XR 1000mg twice daily  Not taking aspirin due to Apixiban    Patient is not experiencing side effects.  Blood sugar monitoring: CGM  Time in Target 56% Time above target 44%    [id number]  3am-6am-147  6am-9am-149  [id number]  [id number]  3pm-6pm 168  6pm-9pm-168  [id number]    Current diabetes symptoms: had a couple of low blood sugars during the day while she was walking. 65, 78  Diet/Exercise:   Appetite is less, when out to eat only eats half of a burger  Has been trying to walk more and goes  to the senior center, walking, chair leg exercises.  Drinks water, rarely has a soda  Follows with endocrinology  Continues to weigh 183lb.      Eye exam in the last 12 months? No  Foot exam: due    Mental Health     Depression and OCD/Schizoeffective Disorder:  Bupropion SR 200mg twice daily  Buspirone 30mg twice daily  Mirtazapine 7.5mg once daily  Paroxetine 60mg once daily.   Cariprazine 4.5mg daily    Was feeling more down last week. Her sister was visiting and when she left she  was feeling down.   Following with her therapist weekly.  Has been having some issues with her tongue thrusting.  Has not been taking mirtazapine because there was a misunderstanding which medication should have been stopped when vraylar was started.       ----------------  I spent 25 minutes with this patient today. All changes were made via collaborative practice agreement with Dr. King. A copy of the visit note was provided to the patient's provider(s).    A summary of these recommendations was sent via [employer].    Telemedicine Visit Details  Type of service:  Telephone visit  Start Time:4:01PM  End Time: 4:26PM       Medication Therapy Recommendations  Type 2 diabetes mellitus with hyperglycemia, with long-term current use of insulin (H)    Current Medication: Semaglutide, 2 MG/DOSE, (OZEMPIC) 8 MG/3ML pen (Discontinued)   Rationale: More effective medication available - Ineffective medication - Effectiveness   Recommendation: Change Medication   Status: Accepted per CPA

## 2024-08-13 DIAGNOSIS — J30.2 SEASONAL ALLERGIC RHINITIS, UNSPECIFIED TRIGGER: ICD-10-CM

## 2024-08-13 RX ORDER — PAROXETINE 20 MG/1
20 TABLET, FILM COATED ORAL EVERY MORNING
COMMUNITY
Start: 2023-11-01 | End: 2024-09-09 | Stop reason: DRUGHIGH

## 2024-08-13 RX ORDER — CETIRIZINE HYDROCHLORIDE 10 MG/1
10 TABLET ORAL DAILY
Qty: 90 TABLET | Refills: 1 | OUTPATIENT
Start: 2024-08-13

## 2024-08-13 RX ORDER — TIRZEPATIDE 5 MG/.5ML
5 INJECTION, SOLUTION SUBCUTANEOUS
Qty: 2 ML | Refills: 0 | Status: SHIPPED | OUTPATIENT
Start: 2024-08-13 | End: 2024-08-15

## 2024-08-13 NOTE — PATIENT INSTRUCTIONS
"Recommendations from today's MTM visit:                                                         Recommend Mounjaro 5mg weekly  Recommend restarting mirtazapine  Recommend following up with psychiatry regarding side effects from vraylar    Follow-up: 1 month    It was great speaking with you today.  I value your experience and would be very thankful for your time in providing feedback in our clinic survey. In the next few days, you may receive an email or text message from [employer] with a link to a survey related to your  clinical pharmacist.\"     To schedule another MTM appointment, please call the clinic directly or you may call the MTM scheduling line at [phone] or toll-free at [phone].     My Clinical Pharmacist's contact information:                                                      Please feel free to contact me with any questions or concerns you have.      Liz Calero, Pharm.D, [facility]  Medication Therapy Management Pharmacist      "

## 2024-08-14 ENCOUNTER — TELEPHONE (OUTPATIENT)
Dept: FAMILY MEDICINE | Facility: CLINIC | Age: 69
End: 2024-08-14
Payer: COMMERCIAL

## 2024-08-14 DIAGNOSIS — E11.9 TYPE 2 DIABETES MELLITUS WITHOUT COMPLICATION, WITH LONG-TERM CURRENT USE OF INSULIN (H): ICD-10-CM

## 2024-08-14 DIAGNOSIS — Z79.4 TYPE 2 DIABETES MELLITUS WITHOUT COMPLICATION, WITH LONG-TERM CURRENT USE OF INSULIN (H): ICD-10-CM

## 2024-08-14 NOTE — TELEPHONE ENCOUNTER
Medication Requested:  tirzepatide (MOUNJARO) 5 MG/0.5ML pen 2 mL 0 8/13/2024 -- No   Sig - Route: Inject 5 mg subcutaneously every 7 days - Subcutaneous     ----------------------      Routing reason: Pharmacy calling to clarify about the dosage. Pharmacy: Does the provider realize this is the middle dose. It should be started the at the 2.5mg Please call to discuss further. Patient could have significant side effects starting higher dose. Should be: Start small and gradually go up.

## 2024-08-14 NOTE — TELEPHONE ENCOUNTER
Health Call Center    Phone Message    May a detailed message be left on voicemail: yes     Reason for Call: Medication Question or concern regarding medication   Prescription Clarification  Name of Medication:   tirzepatide (MOUNJARO) 5 MG/0.5ML pen   Prescribing Provider: Kelly Yan PA-C   Pharmacy:    [id number] - [place], [place] - [address]     What on the order needs clarification?   Pharmacy calling to clarify about the dosage. Pharmacy: Does the provider realize this is the middle dose. It should be started the at the 2.5mg Please call to discuss further. Patient could have significant side effects starting higher dose. Should be: Start small and gradually go up.       Action Taken: Message routed to:  [facility] ENDO    Travel Screening: Not Applicable     Date of Service:

## 2024-08-15 RX ORDER — TIRZEPATIDE 5 MG/.5ML
5 INJECTION, SOLUTION SUBCUTANEOUS
Qty: 2 ML | Refills: 0 | Status: SHIPPED | OUTPATIENT
Start: 2024-08-15 | End: 2024-09-17 | Stop reason: DRUGHIGH

## 2024-08-15 NOTE — TELEPHONE ENCOUNTER
Attempted to contact pharmacy. Unable to get through using automated system. Rx resent with note to fill 5mg.    This was discussed with patient. Please fill 5 mg dose.     Thanks,   Kelly Maciel, RN, [facility]  Diabetes Education Department  [facility], [facility] [place]  Phone: [phone]  [phone]

## 2024-09-07 ENCOUNTER — HEALTH MAINTENANCE LETTER (OUTPATIENT)
Age: 69
End: 2024-09-07

## 2024-09-09 ENCOUNTER — VIRTUAL VISIT (OUTPATIENT)
Dept: PSYCHIATRY | Facility: CLINIC | Age: 69
End: 2024-09-09
Payer: COMMERCIAL

## 2024-09-09 VITALS — BODY MASS INDEX: 33.68 KG/M2 | HEIGHT: 62 IN | WEIGHT: 183 LBS

## 2024-09-09 DIAGNOSIS — F41.1 GAD (GENERALIZED ANXIETY DISORDER): ICD-10-CM

## 2024-09-09 DIAGNOSIS — F25.1 SCHIZOAFFECTIVE DISORDER, DEPRESSIVE TYPE (H): Primary | ICD-10-CM

## 2024-09-09 DIAGNOSIS — E11.9 TYPE 2 DIABETES MELLITUS WITHOUT COMPLICATION, WITH LONG-TERM CURRENT USE OF INSULIN (H): ICD-10-CM

## 2024-09-09 DIAGNOSIS — F51.04 PSYCHOPHYSIOLOGICAL INSOMNIA: ICD-10-CM

## 2024-09-09 DIAGNOSIS — Z79.4 TYPE 2 DIABETES MELLITUS WITHOUT COMPLICATION, WITH LONG-TERM CURRENT USE OF INSULIN (H): ICD-10-CM

## 2024-09-09 PROCEDURE — 99214 OFFICE O/P EST MOD 30 MIN: CPT | Mod: 95 | Performed by: NURSE PRACTITIONER

## 2024-09-09 PROCEDURE — G2211 COMPLEX E/M VISIT ADD ON: HCPCS | Mod: 95 | Performed by: NURSE PRACTITIONER

## 2024-09-09 RX ORDER — PAROXETINE 40 MG/1
40 TABLET, FILM COATED ORAL EVERY EVENING
Qty: 30 TABLET | Refills: 5 | Status: SHIPPED | OUTPATIENT
Start: 2024-09-09

## 2024-09-09 RX ORDER — BUSPIRONE HYDROCHLORIDE 30 MG/1
TABLET ORAL
Qty: 60 TABLET | Refills: 5 | Status: SHIPPED | OUTPATIENT
Start: 2024-09-09

## 2024-09-09 RX ORDER — BUPROPION HYDROCHLORIDE 200 MG/1
200 TABLET, EXTENDED RELEASE ORAL
Qty: 30 TABLET | Refills: 5 | Status: SHIPPED | OUTPATIENT
Start: 2024-09-09

## 2024-09-09 RX ORDER — MIRTAZAPINE 7.5 MG/1
7.5 TABLET, FILM COATED ORAL AT BEDTIME
Qty: 30 TABLET | Refills: 5 | Status: SHIPPED | OUTPATIENT
Start: 2024-09-09

## 2024-09-09 ASSESSMENT — ANXIETY QUESTIONNAIRES
8. IF YOU CHECKED OFF ANY PROBLEMS, HOW DIFFICULT HAVE THESE MADE IT FOR YOU TO DO YOUR WORK, TAKE CARE OF THINGS AT HOME, OR GET ALONG WITH OTHER PEOPLE?: EXTREMELY DIFFICULT
GAD7 TOTAL SCORE: 13
7. FEELING AFRAID AS IF SOMETHING AWFUL MIGHT HAPPEN: NEARLY EVERY DAY

## 2024-09-09 ASSESSMENT — PATIENT HEALTH QUESTIONNAIRE - PHQ9
10. IF YOU CHECKED OFF ANY PROBLEMS, HOW DIFFICULT HAVE THESE PROBLEMS MADE IT FOR YOU TO DO YOUR WORK, TAKE CARE OF THINGS AT HOME, OR GET ALONG WITH OTHER PEOPLE: VERY DIFFICULT
SUM OF ALL RESPONSES TO PHQ QUESTIONS 1-9: 17
SUM OF ALL RESPONSES TO PHQ QUESTIONS 1-9: 17

## 2024-09-09 ASSESSMENT — PAIN SCALES - GENERAL: PAINLEVEL: NO PAIN (0)

## 2024-09-09 NOTE — PROGRESS NOTES
"Virtual Visit Details    Type of service:  Video Visit   Video Start Time: 11:22 AM  Video End Time: 11:50 AM    Originating Location (pt. Location): Home    Distant Location (provider location):  On-site  Platform used for Video Visit: [facility]         Outpatient Psychiatric Progress Note    Name: Holly Muir   : 1955                    Primary Care Provider: Tiffany King MD PhD   Therapist: None    PHQ-9 scores:      2024    10:24 AM 2024     3:41 PM 2024     1:40 PM   PHQ-9 SCORE   PHQ-9 Total Score MyChart   10 (Moderate depression)   PHQ-9 Total Score 21 5 10       ISABELA-7 scores:      2023     9:49 AM 2023     9:43 AM 2024    10:26 AM   ISABELA-7 SCORE   Total Score 8 (mild anxiety) 20 (severe anxiety)    Total Score 8 20 20       Patient Identification:    Patient is a 69 year old year old, single  White Not  or  female  who presents for return visit with me.  Patient is currently unemployed. Patient attended the session alone. Patient prefers to be called: \" Holly\".    Current medications include:   Current Outpatient Medications   Medication Sig Dispense Refill    acetaminophen (TYLENOL) 325 MG tablet Take 2 tablets (650 mg) by mouth every 4 hours as needed for other (For optimal non-opioid multimodal pain management to improve pain control.) 100 tablet 0    blood glucose monitoring (NO BRAND SPECIFIED) meter device kit Use to test blood sugar 4  times daily or as directed. Accuceck Guide meter 1 kit 1    blood glucose monitoring (ONE TOUCH ULTRA 2) meter device kit USE TO TEST BLOOD SUGAR 4 TIMES DAILY OR AS DIRECTED. ACCUCECK GUIDE METER      blood glucose monitoring (ULTRA THIN 30G) lancets Use to test blood sugar 4  times daily or as directed.lancets for acchu check guide 400 each 3    buPROPion (WELLBUTRIN SR) 200 MG 12 hr tablet Take 1 tablet (200 mg) by mouth daily (with breakfast) 30 tablet 3    busPIRone HCl (BUSPAR) 30 MG tablet TAKE 1 TABLET BY MOUTH " TWICE A DAY 60 tablet 3    cariprazine (VRAYLAR) 3 MG capsule Take 1 capsule (3 mg) by mouth daily 30 capsule 1    cetirizine (ZYRTEC) 10 MG tablet Take 1 tablet (10 mg) by mouth daily 30 tablet 5    Continuous Blood Gluc  (FREESTYLE CM 2 READER) GALINA 1 Dose continuous 1 each 0    Continuous Glucose Sensor (FREESTYLE CM 2 SENSOR) MISC Use one sensor every 14 days to read blood sugar 9 each 3    [facility] ASPIRIN ADULT LOW DOSE 81 MG chewable tablet TAKE 1 TABLET BY MOUTH EVERY DAY 90 tablet 1    diclofenac (VOLTAREN) 1 % topical gel Apply 4 g topically 4 times daily 350 g 3    ferrous gluconate (FERGON) 324 (38 Fe) MG tablet Take 1 tablet (324 mg) by mouth daily (with breakfast) 90 tablet 3    gabapentin (NEURONTIN) 600 MG tablet TAKE 2 TABLETS (1,200 MG) BY MOUTH 3 TIMES DAILY FOR 30 DAYS 180 tablet 5    glimepiride (AMARYL) 2 MG tablet Take 2 tablets (4 mg) by mouth 2 times daily (before meals) 360 tablet 3    insulin glargine (LANTUS SOLOSTAR) 100 UNIT/ML pen Inject 20 units at bedtime as directed + 2 units for priming of pen.      insulin pen needle (31G X 5 MM) 31G X 5 MM miscellaneous Use four times 4 day times a day 200 each 3    metFORMIN (GLUCOPHAGE XR) 500 MG 24 hr tablet Take 2 tablets (1,000 mg) by mouth 2 times daily (with meals) Take two tablets with breakfast and one tablet with dinner for one week then increase to two tablets with breakfast and two tablets with dinner thereafter 360 tablet 3    mirtazapine (REMERON) 7.5 MG tablet Take 1 tablet (7.5 mg) by mouth at bedtime 30 tablet 3    Multiple Vitamin (DAILY-ISAAC MULTIVITAMIN) TABS TAKE 1 TABLET BY MOUTH EVERY DAY 90 tablet 0    nystatin (MYCOSTATIN) 154057 UNIT/GM external powder Apply 1 dose topically 3 times daily as needed 60 g 3    omeprazole (PRILOSEC) 20 MG DR capsule TAKE 2 CAPSULES (40 MG) BY MOUTH DAILY *TAKE 30-60 MINUTES BEFORE A MEAL* 180 capsule 2    ONETOUCH ULTRA test strip USE TO TEST BLOOD SUGAR 4 TIMES DAILY OR AS  DIRECTED. ACCU CHECK GUIDE STRIPS 400 strip 3    PARoxetine (PAXIL) 20 MG tablet Take 20 mg by mouth every morning Along with 40mg tablet      PARoxetine (PAXIL) 40 MG tablet Take 1 tablet (40 mg) by mouth every evening 30 tablet 3    pioglitazone (ACTOS) 45 MG tablet Take 1 tablet (45 mg) by mouth daily 90 tablet 1    propranolol ER (INDERAL LA) 80 MG 24 hr capsule TAKE 1 CAPSULE BY MOUTH EVERY DAY 90 capsule 2    ramipril (ALTACE) 5 MG capsule TAKE 1 CAPSULE BY MOUTH EVERY DAY 90 capsule 1    rOPINIRole (REQUIP) 0.25 MG tablet Take 1 tablet (0.25 mg) by mouth every evening 90 tablet 0    simvastatin (ZOCOR) 20 MG tablet TAKE 1 TABLET BY MOUTH EVERYDAY AT BEDTIME 90 tablet 0    tirzepatide (MOUNJARO) 5 MG/0.5ML pen Inject 5 mg subcutaneously every 7 days 2 mL 0    vitamin C (CVS VITAMIN C) 500 MG tablet 1/2 TABLET BY MOUTH JACOBSON WITH IRON SUPPLEMENT BEFORE MEAL 45 tablet 3    VITAMIN D3 25 MCG (1000 UT) tablet TAKE 1 TABLET BY MOUTH EVERY DAY 90 tablet 3     Current Facility-Administered Medications   Medication Dose Route Frequency Provider Last Rate Last Admin    triamcinolone (KENALOG-40) injection 40 mg  40 mg   Santy Harrington, DO   40 mg at 01/08/24 1612        The [place] Prescription Monitoring Program has been reviewed and there are no concerns about diversionary activity for controlled substances at this time.      I was able to review most recent Primary Care Provider, specialty provider, and therapy visit notes that I have access to.     Today, patient reports recently, she felt bugs were biting her but there was nothing there - went.  Colostomy bag spills making hard to leave the house.  She gets depressed.   Right leg is doing better.  Left knee  hurting Seeing a therapist.  Feels supported by her children. Back pain limits her activity level.  She gets injections for the pain.  She wakes up in the middle of the night frequently.     has a past medical history of Bilateral knee pain, Cataracts, both  eyes (5/8/2013), Cervical cancer (H), Chronic kidney disease, stage 3a (H) (11/17/2021), Diabetes, Diabetic retinopathy (H), HTN, Inflammatory arthritis, Major depression, Memory loss, Nephropathy, OA (osteoarthritis) of knee (9/11/2013), Other and unspecified hyperlipidemia, Pterygium eye, and Sacral nerve root injury.    She has no past medical history of Amblyopia, Bleeding disorder (H24), Glaucoma, Glaucoma (increased eye pressure), Heart disease, Macular degeneration, Retinal detachment, Senile macular degeneration, Sleep apnea, Strabismus, Stroke (H), Surgical complications, Unspecified asthma(493.90), or Uveitis.    Social history updates:    She continues to live with her daughter.    Substance use updates:    No alcohol use reported  Tobacco use: No    Vital Signs:   There were no vitals taken for this visit.    Labs:    Most recent laboratory results reviewed and no new labs.     Mental Status Examination:  Appearance: awake, alert and casual dress  Attitude: cooperative  Eye Contact:  adequate  Gait and Station: No dizziness or falls  Psychomotor Behavior:  intact station, gait and muscle tone  Oriented to:  time, person, and place  Attention Span and Concentration:  Normal  Speech:   vtspeech: clear, coherent and Speaks: English  Mood:  anxious and depressed  Affect:  mood congruent  Associations:  no loose associations  Thought Process:  goal oriented  Thought Content:  no evidence of suicidal ideation or homicidal ideation, no auditory hallucinations present, and no visual hallucinations present  Recent and Remote Memory:  intact Not formally assessed. No amnesia.  Fund of Knowledge: appropriate  Insight:  good  Judgment: good  Impulse Control:  good    Suicide Risk Assessment:  Today Holly Muir reports no thoughts to harm themself or others. In addition, there are notable risk factors for self-harm, including age, single status, anxiety, and withdrawing. However, risk is mitigated by commitment  to family, history of seeking help when needed, future oriented, denies suicidal intent or plan, and denies homicidal ideation, intent, or plan. Therefore, based on all available evidence including the factors cited above, Holly Muir does not appear to be at imminent risk for self-harm, does not meet criteria for a 72-hr hold, and therefore remains appropriate for ongoing outpatient level of care.  A thorough assessment of risk factors related to suicide and self-harm have been reviewed and are noted above. The patient convincingly denies suicidality on several occasions. Local community safety resources printed and reviewed for patient to use if needed. There was no deceit detected, and the patient presented in a manner that was believable.     DSM5 Diagnosis:  295.70  (F25.1) Schizoaffective Disorder Depressive Type  300.02 (F41.1) Generalized Anxiety Disorder  780.52 (G47.00) Insomnia Disorder   With non-sleep disorder mental comorbidity  Episodic      Medical comorbidities include:   Patient Active Problem List    Diagnosis Date Noted    S/P total knee arthroplasty, right 11/03/2023     Priority: Medium    Osteoarthritis of right knee, unspecified osteoarthritis type 10/02/2023     Priority: Medium    Noncompliance with medication regimen 09/07/2023     Priority: Medium     overuse of oxycodone, not taking gabapentin as scheduled.      Iron deficiency anemia secondary to inadequate dietary iron intake 08/04/2023     Priority: Medium    Pneumonia 05/07/2023     Priority: Medium    Chronic right shoulder pain 02/17/2023     Priority: Medium    Chronic kidney disease, stage 3a (H) 11/17/2021     Priority: Medium    Restless leg syndrome 09/22/2021     Priority: Medium    Tremor 09/22/2021     Priority: Medium    Fecal urgency 05/20/2020     Priority: Medium     Added automatically from request for surgery [id number]      Pseudophakia of right eye 10/25/2019     Priority: Medium    Anal stenosis 06/27/2019      Priority: Medium    S/P partial colectomy 06/27/2019     Priority: Medium    GERD (gastroesophageal reflux disease) 01/23/2019     Priority: Medium    Anemia 01/22/2019     Priority: Medium    Chest pain, musculoskeletal 01/22/2019     Priority: Medium    Diabetic neuropathy (H) 01/22/2019     Priority: Medium    Hyponatremia 01/22/2019     Priority: Medium    Schizoaffective disorder (H) 01/22/2019     Priority: Medium    Obesity (BMI 35.0-39.9) with comorbidity (H) 11/14/2018     Priority: Medium    Visual hallucination 04/21/2016     Priority: Medium    Type 2 diabetes mellitus with hyperglycemia, with long-term current use of insulin (H) 12/23/2015     Priority: Medium    Fecal incontinence due to anorectal disorder 09/28/2015     Priority: Medium    Anxiety 09/02/2015     Priority: Medium    Bilateral low back pain without sciatica 06/17/2015     Priority: Medium    Urinary retention 01/22/2015     Priority: Medium    Colostomy, evaluate (H) 10/17/2014     Priority: Medium    Encounter for long-term (current) use of insulin (H) 10/12/2014     Priority: Medium    Hypoglycemia due to insulin 09/17/2014     Priority: Medium    Rotator cuff impingement syndrome 08/07/2014     Priority: Medium    Cataracts, both eyes 05/08/2013     Priority: Medium    Dermatochalasis 05/10/2012     Priority: Medium    Presbyopia 05/10/2012     Priority: Medium    OCD (obsessive compulsive disorder) 05/10/2012     Priority: Medium    HTN, goal below 140/90 05/10/2012     Priority: Medium    Pain in joint, hand 01/25/2012     Priority: Medium    Trigger finger, acquired 01/25/2012     Priority: Medium     Problem list name updated by automated process. Provider to review      Synovitis and tenosynovitis 01/25/2012     Priority: Medium     Problem list name updated by automated process. Provider to review      Mixed incontinence urge and stress 06/01/2010     Priority: Medium    Memory loss 06/01/2010     Priority: Medium     Diabetes  coma around 2003      S/P colostomy (H) 04/03/2010     Priority: Medium     Due to injury to colon while she had a hysterectomy (age late 30s):  Seen Dr. Doherty in colorectal surgery at [facility]-does not want to operated. Needs her social support and memory loss issues be resolved first.       History of cervical cancer 09/23/2009     Priority: Medium     status post NATALIE/BSO. Being followed by Dr. Angelita Addison at [facility] gyn/onc      Severe major depression with psychotic features (H) 07/31/2009     Priority: Medium     Psychiatrist: Dr. Perkins at [facility].   Neuropsychological evalation at [facility] on 12/1/09: lower end cognitive functioning and multifactorial in etiologies including effects of psychosis, medications nd perhaps non-restorative sleep. Also has features of OCD. Recommend psychiatry referral for further evaluation of past Dx of schizophrenia and also psychotherapy.   Sees a therapist monthly now (has met goals) but will monitor for relapse       Hyperlipidemia LDL goal <100 01/27/2009     Priority: Medium    Primary generalized (osteo)arthritis 01/29/2014     Priority: Low       Assessment:    Holly Muir has been isolating to her home as she has had some colostomy bag malfunctions.  Since the decrease in the Vraylar she had begun experiencing visual and tactile hallucinations.  I increased the dose back up to 4.5 mg daily.  Mood is mostly stable and anxiety and depression symptoms are minimal.  She sleeps at night but often has frequent awakenings.  She feels the Vraylar increase will help her sleep better.  Family life is supportive.    Medication side effects and alternatives were reviewed. Health promotion activities recommended and reviewed today. All questions addressed. Education and counseling completed regarding risks and benefits of medications and psychotherapy options.    Treatment Plan:      1.  Bupropion  mg daily  for depression symptom relief  2.  Buspirone 30 mg twice daily for anxiety symptom relief  3.  Increase Vraylar to 4.5 mg daily for mood regulation and decrease hallucinations  4.  Mirtazapine 7.5 mg at bedtime for depression and to help you sleep  5.  Paroxetine 40 mg every evening to give you relief from depression and anxiety symptoms  6.  Someone will contact you for scheduling you with a new psychiatry provider as I am retiring September 13    Continue all other medications as reviewed per electronic medical record today.   Safety plan reviewed. To the Emergency Department as needed or call after hours crisis line at [phone] or [phone]. [place] Crisis Text Line. Text [place] to 458549 or Suicide LifeLine Chat: suicidepreventionlifeline.org/chat/  To schedule individual or family therapy, call [facility] at [phone]  Schedule an appointment in 3 months or sooner as needed. Call [facility] at [phone] to schedule.  Follow up with primary care provider as planned or for acute medical concerns.  Call the psychiatric nurse line with medication questions or concerns at [phone]  MyChart may be used to communicate with your provider, but this is not intended to be used for emergencies.        Crisis Resources   The [facility] is an adults only unit located at [facility] in [place] is a short term (generally less than 23 hour stay) designed for crisis intervention and stabilization. Pts have the opportunity to meet quickly with a behavioral health team for evaluation in a calm and peaceful therapuetic environment. To be evaluated for admission pts are triaged throught the [facility] ED.      The following hotlines are for both adults and children. The and are open 24 hours a day, 7 days a week unless noted otherwise.        Crisis Lines      Crisis Text Line  Text 766088  You will be connected with a trained live crisis counselor to provide support.         [place] Hotline  3.551.576.5910 [hope]        línea de crisis española  [phone]        [facility] Helpline  [phone]        National Hope Line  7.131.569.8411 [hope]        National Suicide Prevention Lifeline  Free and confidential support  988 or 1.800.151.TALK [6855]  http://suicidepreventionlifeline.org        The [employer] (LGBTQ Youth Crisis Line)  6.171.561.5447  text START to 162-062        [place] Crisis Line  2.829.013.6594 (Press 1)  or text 055063    [facility], operated by [facility] Mental Health Crisis Response  Within [place], call **CRISIS [**166604] to be connected to a mental health professional who can assist you.        [facility] Crisis  [phone]      [facility] Mobile Crisis  [phone]      [facility] Crisis  [phone]      [facility] Mobile Crisis  [phone] (adults)  [phone] (children)      [facility] Mobile Crisis  [phone] (adults)  [phone] (children)      [facility] Mobile Crisis  [phone]      [facility] Mobile Crisis  [phone]    Community Resources      Fast Tracker  Linking people to mental health and substance use disorder resources  fasttrackermn.org        [place] Mental Health Warmline  Peer to peer support  5 pm to 9 am 7 days/week  0.689.575.3905  https://mnwitw.org/ashwini        National [place] on Mental Illness (SOBEIDA)  905.655.1151 or 1.888.SOBEIDA.HELPS  https://namimn.org/        [facility] for Adult Mental [facility]  [facility] residents [address]  [phone]        [facility]  Free mental health counseling  https://walkin.org/  612.870.0565 X2    Mental Health Apps      Calm Harm  https://calmharm.co.uk/      My3  https://my3app.org/      [place] Safety Plan  https://www.mysafetyplan.org/   Administrative Billing:   Time spent with patient includes counseling and coordination of care regarding above  diagnoses and treatment plan.    The longitudinal plan of care for the diagnosis(es)/condition(s) as documented were addressed during this visit. Due to the added complexity in care, I will continue to support Holly in the subsequent management and with ongoing continuity of care.    Patient Status:  This is a continuous care patient and medications will be prescribed by the psychiatric provider until further indicated.    Signed:   Stacey Serrano [facility]   Psychiatry       Answers submitted by the patient for this visit:  Patient Health Questionnaire (Submitted on 9/9/2024)  If you checked off any problems, how difficult have these problems made it for you to do your work, take care of things at home, or get along with other people?: Very difficult  PHQ9 TOTAL SCORE: 17  Patient Health Questionnaire (G7) (Submitted on 9/9/2024)  ISABELA 7 TOTAL SCORE: 13

## 2024-09-09 NOTE — NURSING NOTE
Current patient location: [address] [place] [id number]    Is the patient currently in the state of [place]? YES    Visit mode:VIDEO    If the visit is dropped, the patient can be reconnected by: VIDEO VISIT: Text to cell phone:   Telephone Information:   Mobile [phone]       Will anyone else be joining the visit? NO  (If patient encounters technical issues they should call [phone])    How would you like to obtain your AVS? MyChart    Are changes needed to the allergy or medication list? No    Are refills needed on medications prescribed by this physician? NO    Rooming Documentation:  Questionnaire(s) completed      Reason for visit: JENNIE QUEZADA

## 2024-09-11 NOTE — PATIENT INSTRUCTIONS
"Patient Education   The Panel Psychiatry Program  What to Expect  Here's what to expect in the Panel Psychiatry Program.   About the program  You'll be meeting with a psychiatric doctor to check your mental health. A psychiatric doctor helps you deal with troubling thoughts and feelings by giving you medicine. They'll make sure you know the plan for your care. You may see them for a long time. When you're feeling better, they may refer you back to seeing your family doctor.   If you have any questions, we'll be glad to talk to you.  About visits  Be open  At your visits, please talk openly about your problems. It may feel hard, but it's the best way for us to help you.  Cancelling visits  If you can't come to your visit, please call us right away at [phone]. If you don't cancel at least 24 hours (1 full day) before your visit, that's \"late cancellation.\"  Not showing up for your visits  Being very late is the same as not showing up. You'll be a \"no show\" if:  You're more than 15 minutes late for a 30-minute (half hour) visit.  You're more than 30 minutes late for a 60-minute (full hour) visit.  If you cancel late or don't show up 2 times within 6 months, we may end your care.  Getting help between visits  If you need help between visits, you can call us Monday to Friday from 8 a.m. to 4:30 p.m. at [phone].  Emergency care  Call 911 or go to the nearest emergency department if your life or someone else's life is in danger.  Call 988 anytime to reach the national Suicide and Crisis hotline.  Medicine refills  To refill your medicine, call your pharmacy. You can also call [facility]'s Behavioral Access at [phone], Monday to Friday, 8 a.m. to 4:30 p.m. It can take 1 to 3 business days to get a refill.   Forms, letters, and tests  You may have papers to fill out, like FMLA, short-term disability, and workability. We can help you with these forms at your visits, but you must have an " appointment. You may need more than 1 visit for this, to be in an intensive therapy program, or both.  Before we can give you medicine for ADHD, we may refer you to get tested for it or confirm it another way.  We may not be able to give you an emotional support animal letter.  We don't do mental health checks ordered by the court.   We don't do mental health testing, but we can refer you to get tested.   Thank you for choosing us for your care.  For informational purposes only. Not to replace the advice of your health care provider. Copyright   2022 [facility]. All rights reserved. [employer] 780421 - 12/22.       Treatment Plan:      1.  Bupropion  mg daily for depression symptom relief  2.  Buspirone 30 mg twice daily for anxiety symptom relief  3.  Increase Vraylar to 4.5 mg daily for mood regulation and decrease hallucinations  4.  Mirtazapine 7.5 mg at bedtime for depression and to help you sleep  5.  Paroxetine 40 mg every evening to give you relief from depression and anxiety symptoms  6.  Someone will contact you for scheduling you with a new psychiatry provider as I am retiring September 13    Continue all other medical directions per primary care provider.   Continue all other medications as reviewed per electronic medical record today.   Safety plan reviewed. To the Emergency Department as needed or call after hours crisis line at [phone] or [phone]. [place] Crisis Text Line: Text [place] to 689811  or  Suicide LifeLine Chat: suicidepreventionlifeline.org/chat/  To schedule individual or family therapy, call [facility] at [phone].   Schedule an appointment 3 months  or sooner as needed.  Call [facility] at [phone] to schedule.  Follow up with primary care provider as planned or for acute medical concerns.  Call the psychiatric nurse line with medication questions or concerns at [phone].  TESARO may be used to communicate with  your provider, but this is not intended to be used for emergencies.        Crisis Resources   The [facility]Path is an adults only unit located at [facility] in [place] is a short term (generally less than 23 hour stay) designed for crisis intervention and stabilization. Pts have the opportunity to meet quickly with a behavioral health team for evaluation in a calm and peaceful therapuetic environment. To be evaluated for admission pts are triaged throught the [facility] ED.      The following hotlines are for both adults and children. The and are open 24 hours a day, 7 days a week unless noted otherwise.        Crisis Lines      Crisis Text Line  Text 559840  You will be connected with a trained live crisis counselor to provide support.        Gambling Hotline  3.254.257.7126 [hope]        línea de crisis española  386.642.7479        [facility] [employer] Helpline  511.725.8598        National Hope Line  3.942.355.0434 [hope]        National Suicide Prevention Lifeline  Free and confidential support  988 or 1.272.990.TALK [8255]  http://suicidepreventionlifeline.org        The [employer] Project (LGBTQ Youth Crisis Line)  6.905.661.3580  text START to 914-873        's Crisis Line  4.886.220.2553 (Press 1)  or text 131461    [facility] Mental Health Crisis Response  Within [place], call **CRISIS [**913115] to be connected to a mental health professional who can assist you.        [facility] Crisis  [phone]      [facility] Mobile Crisis  [phone]      [facility] Crisis  [phone]      [facility] Mobile Crisis  [phone] (adults)  [phone] (children)      [facility] Mobile Crisis  [phone] (adults)  [phone] (children)      [facility] Mobile Crisis  [phone]      [facility] Mobile Crisis  [phone]    Community Resources      Fast Tracker  Linking people to mental health and substance use disorder resources  fasttrackermn.org         [place] Mental Health Warmline  Peer to peer support  5 pm to 9 am 7 days/week  0.067.753.2890  https://mnwitw.org/ashwini        National Wellman on Mental Illness (SOBEIDA)  639.664.1300 or 1.888.SOBEIDA.HELPS  https://namimn.org/        [facility] for Adult [facility]  [facility] residents [address]  [phone]        [facility]  Free mental health counseling  https://walkin.org/  612.870.0565 X2    Mental Health Apps      Calm Harm  https://calmharm.co.uk/      My3  https://my3app.org/      [place] Safety Plan  https://www.mysafetyplan.org/

## 2024-09-13 DIAGNOSIS — E11.40 TYPE 2 DIABETES MELLITUS WITH DIABETIC NEUROPATHY (H): ICD-10-CM

## 2024-09-13 RX ORDER — INSULIN GLARGINE 100 [IU]/ML
INJECTION, SOLUTION SUBCUTANEOUS
Qty: 30 ML | Refills: 3 | Status: SHIPPED | OUTPATIENT
Start: 2024-09-13

## 2024-09-13 NOTE — TELEPHONE ENCOUNTER
LANTUS SOLOSTAR 100 UNIT/ML     requested  INJECT 30-35 UNITS AT BEDTIME AS DIRECTED + 2 UNITS FOR PRIMING OF PEN. MAX DOSE 35 UNITS.   Current:Inject 20 units at bedtime as directed + 2 units for priming of pen.  Class: No Print Out  Last Written Prescription Date:  5-13-24  Last Fill Quantity: ,   # refills:   Last Office Visit : 5-23-24  Future Office visit:  9-19-24    Routing refill request to provider for review/approval because:  Medication is reported/historical/NO PRINT OUT  Requested directions does not match current  Insulin and insulin pump supplies - refilled per Endocrine clinic.

## 2024-09-15 NOTE — TELEPHONE ENCOUNTER
Continuous Blood Gluc  (FREESTYLE CM 2 READER) GALINA   Disp-1 each, R-0   Start: 01/04/2023 5/23/2024  [facility] Kelly Martinez PA-C  Endocrinology, Diabetes, and Metabolism

## 2024-09-16 NOTE — PROGRESS NOTES
Medication Therapy Management (MTM) Encounter    ASSESSMENT:                            Medication Adherence/Access: See below for considerations    Diabetes:   Patient is not meeting A1c goal of < 7%.May benefit from increasing Mounjaro to 7.5mg for weight loss and blood sugar control. With increasing Mounjaro dose will decrease glimepiride dose to prevent low blood sugars.     PLAN:                          Recommend Mounjaro 7.5mg weekly  Decrease glimepiride to 4mg once daily  Check with family members to see if they picked up the sensors from the pharmacy.    Follow-up: 1 month    SUBJECTIVE/OBJECTIVE:                          Holly Muir is a 69 year old female called for a follow-up visit.  Today's visit is a follow-up MTM visit from 8/12/2024.     Reason for visit:  blood sugars    Allergies/ADRs: Reviewed in chart  Past Medical History: Reviewed in chart  Tobacco: She reports that she quit smoking about 47 years ago. Her smoking use included cigarettes. She started smoking about 48 years ago. She has never used smokeless tobacco.  Alcohol: not currently using  Lives between two daughters.     Medication Adherence/Access: daughters help manage medications. Uses a pill box.   Per patient, daughters hand her her medications to her.  Reports not missing any doses  Has timer on her phone to remind her when to take her insulin.  The patient fills medications at [place]: NO, [facility]    Diabetes   Type 2 Diabetes:    Lantus 20 units daily  Mounjaro 5mg weekly  Actos 45mg daily  Glimepiride 4mg twice daily  Metformin XR 1000mg twice daily  Not taking aspirin due to Apixiban    Patient is not experiencing side effects.  Blood sugar monitoring: CGM-doesn't have sensor because pharmacy is saying it can't be filled until November.   9/17 138, 145, post prandial 85,69  7 day average 142 14 day average 143  Is not able to find time in target on her reader or average throughout  the day    Current diabetes symptoms: reports having a few low blood sugars because she gets been busy and not eating. Has had 3-4 lower blood sugars over the last month.  Diet/Exercise:   Appetite is less, when out to eat only eats half of a burger  Has been trying to walk more and goes  to the senior center, walking, chair leg exercises.  Drinks water, rarely has a soda  Follows with endocrinology  Pants are feeling a little looser but hasn't weighed herself since starting Mounjaro.      Eye exam in the last 12 months? No  Foot exam: due     ----------------  I spent 16 minutes with this patient today. All changes were made via collaborative practice agreement with Dr. King. A copy of the visit note was provided to the patient's provider(s).    A summary of these recommendations was sent via [employer].    Telemedicine Visit Details  Type of service:  Telephone visit  Start Time:4:00PM  End Time: 4:16PM       Medication Therapy Recommendations  Type 2 diabetes mellitus with hyperglycemia, with long-term current use of insulin (H)    Current Medication: tirzepatide (MOUNJARO) 5 MG/0.5ML pen (Discontinued)   Rationale: Dose too low - Dosage too low - Effectiveness   Recommendation: Increase Dose   Status: Accepted per CPA

## 2024-09-17 ENCOUNTER — VIRTUAL VISIT (OUTPATIENT)
Dept: PHARMACY | Facility: CLINIC | Age: 69
End: 2024-09-17
Payer: COMMERCIAL

## 2024-09-17 DIAGNOSIS — E11.9 TYPE 2 DIABETES MELLITUS WITHOUT COMPLICATION, WITH LONG-TERM CURRENT USE OF INSULIN (H): ICD-10-CM

## 2024-09-17 DIAGNOSIS — Z79.4 TYPE 2 DIABETES MELLITUS WITHOUT COMPLICATION, WITH LONG-TERM CURRENT USE OF INSULIN (H): Primary | ICD-10-CM

## 2024-09-17 DIAGNOSIS — E11.9 TYPE 2 DIABETES MELLITUS WITHOUT COMPLICATION, WITH LONG-TERM CURRENT USE OF INSULIN (H): Primary | ICD-10-CM

## 2024-09-17 DIAGNOSIS — Z79.4 TYPE 2 DIABETES MELLITUS WITHOUT COMPLICATION, WITH LONG-TERM CURRENT USE OF INSULIN (H): ICD-10-CM

## 2024-09-17 PROCEDURE — 99606 MTMS BY PHARM EST 15 MIN: CPT | Mod: 93 | Performed by: PHARMACIST

## 2024-09-17 PROCEDURE — 99607 MTMS BY PHARM ADDL 15 MIN: CPT | Mod: 93 | Performed by: PHARMACIST

## 2024-09-17 RX ORDER — TIRZEPATIDE 7.5 MG/.5ML
7.5 INJECTION, SOLUTION SUBCUTANEOUS
Qty: 2 ML | Refills: 0 | Status: SHIPPED | OUTPATIENT
Start: 2024-09-17 | End: 2024-09-24

## 2024-09-17 RX ORDER — GLIMEPIRIDE 2 MG/1
4 TABLET ORAL
Qty: 360 TABLET | Refills: 3 | Status: SHIPPED | OUTPATIENT
Start: 2024-09-17

## 2024-09-17 NOTE — PROGRESS NOTES
Outcome for 09/17/24 10:13 AM: Device upload instructions sent to patient via Shaun Rajan CMA  Adult Endocrinology  [facility] [place]

## 2024-09-17 NOTE — PATIENT INSTRUCTIONS
"Recommendations from today's MTM visit:                                                         Recommend Mounjaro 7.5mg weekly  Decrease glimepiride to 4mg once daily  Check with family members to see if they picked up the sensors from the pharmacy.    Follow-up: 1 month    It was great speaking with you today.  I value your experience and would be very thankful for your time in providing feedback in our clinic survey. In the next few days, you may receive an email or text message from [employer] with a link to a survey related to your  clinical pharmacist.\"     To schedule another MTM appointment, please call the clinic directly or you may call the MTM scheduling line at [phone] or toll-free at [phone].     My Clinical Pharmacist's contact information:                                                      Please feel free to contact me with any questions or concerns you have.      Liz Calero, Pharm.D, [facility]  Medication Therapy Management Pharmacist      "

## 2024-09-18 RX ORDER — TIRZEPATIDE 5 MG/.5ML
5 INJECTION, SOLUTION SUBCUTANEOUS
OUTPATIENT
Start: 2024-09-18

## 2024-09-19 ENCOUNTER — OFFICE VISIT (OUTPATIENT)
Dept: ENDOCRINOLOGY | Facility: CLINIC | Age: 69
End: 2024-09-19
Payer: COMMERCIAL

## 2024-09-19 VITALS
DIASTOLIC BLOOD PRESSURE: 73 MMHG | RESPIRATION RATE: 18 BRPM | WEIGHT: 193 LBS | SYSTOLIC BLOOD PRESSURE: 120 MMHG | BODY MASS INDEX: 35.3 KG/M2 | OXYGEN SATURATION: 100 % | HEART RATE: 78 BPM

## 2024-09-19 DIAGNOSIS — E11.9 TYPE 2 DIABETES MELLITUS WITHOUT COMPLICATION, WITH LONG-TERM CURRENT USE OF INSULIN (H): Primary | ICD-10-CM

## 2024-09-19 DIAGNOSIS — Z79.4 TYPE 2 DIABETES MELLITUS WITHOUT COMPLICATION, WITH LONG-TERM CURRENT USE OF INSULIN (H): Primary | ICD-10-CM

## 2024-09-19 DIAGNOSIS — Z23 NEED FOR PROPHYLACTIC VACCINATION AND INOCULATION AGAINST INFLUENZA: ICD-10-CM

## 2024-09-19 LAB
EST. AVERAGE GLUCOSE BLD GHB EST-MCNC: 174 MG/DL
HBA1C MFR BLD: 7.7 %

## 2024-09-19 PROCEDURE — G0008 ADMIN INFLUENZA VIRUS VAC: HCPCS | Performed by: PHYSICIAN ASSISTANT

## 2024-09-19 PROCEDURE — 99214 OFFICE O/P EST MOD 30 MIN: CPT | Mod: 25 | Performed by: PHYSICIAN ASSISTANT

## 2024-09-19 PROCEDURE — 83036 HEMOGLOBIN GLYCOSYLATED A1C: CPT | Performed by: PHYSICIAN ASSISTANT

## 2024-09-19 PROCEDURE — 90662 IIV NO PRSV INCREASED AG IM: CPT | Performed by: PHYSICIAN ASSISTANT

## 2024-09-19 NOTE — LETTER
9/19/2024      Holly Muir  [address] [address] [id number]      Dear Colleague,    Thank you for referring your patient, Holly Muir, to the [facility]. Please see a copy of my visit note below.    Outcome for 09/17/24 10:13 AM: Device upload instructions sent to patient via Shaun Rajan CMA  Adult Endocrinology  [facility], [place]      Assessment/Plan :   Type 2 DM. Holly is doing okay. She was worried that her hemoglobin A1C was going to be elevated but it had actually improved from previous. We reviewed her current medications and she seems to be tolerating the Mounjaro, well. She did just recently increase to 7.5 mg weekly and this should help to further improve her blood sugars. We reviewed the possible adverse effects and if she has any problems, she is to contact our office. I encouraged her to continue to work on diet, as well. We will follow-up in 4 mos.  Vaccination. She requested to flu vaccine at today's visit.       I have independently reviewed and interpreted labs, imaging as indicated.      Chief complaint:  Holly is a 69 year old female seen in consultation at the request of   I have reviewed Care Everywhere including [facility], [facility], [facility] , [facility], [place] lab reports, imaging reports and provider notes as indicated.      HISTORY OF PRESENT ILLNESS  Holly continues to work on managing her blood sugars. She was worried that her hemoglobin A1C was going to be up since she has not been as consistent with her diet. She has been taking her medication, as directed, and she was glad to hear that her A1C has actually improved from previous. At present time she is taking Lantus 20 unit(s), pioglitazone 45 mg daily, metformin 2000 mg daily, glimepiride 4 mg daily, and she recently increased Mounjaro to 7.5 mg weekly. She also states that her children and grandchildren have been  trying to help her stay on track.    Holly was monitoring her blood sugars with the FreeStyle 2 sensor. She was unable to  the sensor last week, so she has been using fingerstick testing to monitor blood sugars, recently. Holly has not had any problems with her vision or an increase in numbness/tingling in her feet. She also has not had any adverse reaction to the Mounjaro. She has a history of nausea and bloating but she seems to be tolerating the Mounjaro, well.     Holly was diagnosed with diabetes many years ago. She has used a combination of insulin and oral medications, for some time. She has struggled to keep her hemoglobin A1C to goal, in the past. She does understand the importance of diet and exercise in managing diabetes but she struggles with food insecurity. She states that she has been going to a new food pantry through the [employer] and that it has more options for fresh vegetables. She would really like to lose weight and she feels like that would help with her overall health. Her diabetes is complicated by obesity, hyperlipidemia, CKD stage 3, GERD, schizoaffective disorder, and osteoarthritis.     Endocrine relevant labs are as follows:   Latest Reference Range & Units 09/19/24 14:20   Afinion Hemoglobin A1c POCT <=5.7 % 7.7 (H)   (H): Data is abnormally high   Latest Reference Range & Units 05/23/24 10:44   Afinion Hemoglobin A1c POCT <=5.7 % 7.9 (H)   (H): Data is abnormally high   Latest Reference Range & Units 05/23/24 11:25   Albumin Urine mg/g Cr 0.00 - 25.00 mg/g Cr 98.92 (H)   (H): Data is abnormally high   Latest Reference Range & Units 05/23/24 11:25   Albumin Urine mg/L mg/L 91.8     REVIEW OF SYSTEMS    Endocrine: positive for diabetes and obesity  Skin: negative  Eyes: negative for, visual blurring, redness, tearing  Ears/Nose/Throat: negative  Respiratory: No shortness of breath, dyspnea on exertion, cough, or hemoptysis  Cardiovascular: negative for, chest pain, dyspnea  on exertion, lower extremity edema, and exercise intolerance  Gastrointestinal: negative for, nausea, vomiting, constipation, and diarrhea  Genitourinary: negative for, nocturia, dysuria, frequency, and urgency  Musculoskeletal: negative for, muscular weakness, nocturnal cramping, and foot pain  Neurologic: positive for numbness or tingling of feet, negative for, local weakness, and numbness or tingling of hands  Psychiatric: negative  Hematologic/Lymphatic/Immunologic: negative    Past Medical History  Past Medical History:   Diagnosis Date     Bilateral knee pain      Cataracts, both eyes 5/8/2013     Cervical cancer (H)      Chronic kidney disease, stage 3a (H) 11/17/2021     Diabetes      Diabetic retinopathy (H)      HTN      Inflammatory arthritis      Major depression      Memory loss      Nephropathy      OA (osteoarthritis) of knee 9/11/2013     Other and unspecified hyperlipidemia      Pterygium eye      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 Accuceck Guide meter 1 kit 1     blood glucose monitoring (ONE TOUCH ULTRA 2) meter device kit USE TO TEST BLOOD SUGAR 4 TIMES DAILY OR AS DIRECTED. ACCUCECK GUIDE METER       blood glucose monitoring (ULTRA THIN 30G) lancets Use to test blood sugar 4  times daily or as directed.lancets for acchu check guide 400 each 3     buPROPion (WELLBUTRIN SR) 200 MG 12 hr tablet Take 1 tablet (200 mg) by mouth daily (with breakfast). 30 tablet 5     busPIRone HCl (BUSPAR) 30 MG tablet TAKE 1 TABLET BY MOUTH TWICE A DAY 60 tablet 5     cariprazine (VRAYLAR) 4.5 MG capsule Take 1  capsule (4.5 mg) by mouth daily. 30 capsule 5     cetirizine (ZYRTEC) 10 MG tablet Take 1 tablet (10 mg) by mouth daily 30 tablet 5     Continuous Glucose  (FREESTYLE CM 2 READER) GALINA 1 DOSE CONTINUOUS 1 each 0     Continuous Glucose Sensor (FREESTYLE CM 2 SENSOR) MISC Use one sensor every 14 days to read blood sugar 9 each 3     CVS ASPIRIN ADULT LOW DOSE 81 MG chewable tablet TAKE 1 TABLET BY MOUTH EVERY DAY 90 tablet 1     diclofenac (VOLTAREN) 1 % topical gel Apply 4 g topically 4 times daily 350 g 3     ferrous gluconate (FERGON) 324 (38 Fe) MG tablet Take 1 tablet (324 mg) by mouth daily (with breakfast) 90 tablet 3     gabapentin (NEURONTIN) 600 MG tablet TAKE 2 TABLETS (1,200 MG) BY MOUTH 3 TIMES DAILY FOR 30 DAYS 180 tablet 5     glimepiride (AMARYL) 2 MG tablet Take 2 tablets (4 mg) by mouth every morning (before breakfast). 360 tablet 3     insulin glargine (LANTUS SOLOSTAR) 100 UNIT/ML pen INJECT 20 UNITS AT BEDTIME AS DIRECTED + 2 UNITS FOR PRIMING OF PEN. 30 mL 3     insulin pen needle (31G X 5 MM) 31G X 5 MM miscellaneous Use four times 4 day times a day 200 each 3     metFORMIN (GLUCOPHAGE XR) 500 MG 24 hr tablet Take 2 tablets (1,000 mg) by mouth 2 times daily (with meals) Take two tablets with breakfast and one tablet with dinner for one week then increase to two tablets with breakfast and two tablets with dinner thereafter 360 tablet 3     mirtazapine (REMERON) 7.5 MG tablet Take 1 tablet (7.5 mg) by mouth at bedtime. 30 tablet 5     Multiple Vitamin (DAILY-ISAAC MULTIVITAMIN) TABS TAKE 1 TABLET BY MOUTH EVERY DAY 90 tablet 0     nystatin (MYCOSTATIN) 287667 UNIT/GM external powder Apply 1 dose topically 3 times daily as needed 60 g 3     omeprazole (PRILOSEC) 20 MG DR capsule TAKE 2 CAPSULES (40 MG) BY MOUTH DAILY *TAKE 30-60 MINUTES BEFORE A MEAL* 180 capsule 2     ONETOUCH ULTRA test strip USE TO TEST BLOOD SUGAR 4 TIMES DAILY OR AS DIRECTED. ACCU CHECK GUIDE STRIPS 400 strip 3      PARoxetine (PAXIL) 40 MG tablet Take 1 tablet (40 mg) by mouth every evening. 30 tablet 5     pioglitazone (ACTOS) 45 MG tablet Take 1 tablet (45 mg) by mouth daily 90 tablet 1     propranolol ER (INDERAL LA) 80 MG 24 hr capsule TAKE 1 CAPSULE BY MOUTH EVERY DAY 90 capsule 2     ramipril (ALTACE) 5 MG capsule TAKE 1 CAPSULE BY MOUTH EVERY DAY 90 capsule 1     rOPINIRole (REQUIP) 0.25 MG tablet Take 1 tablet (0.25 mg) by mouth every evening 90 tablet 0     simvastatin (ZOCOR) 20 MG tablet TAKE 1 TABLET BY MOUTH EVERYDAY AT BEDTIME 90 tablet 0     tirzepatide (MOUNJARO) 7.5 MG/0.5ML pen Inject 7.5 mg subcutaneously every 7 days. 2 mL 0     vitamin C (CVS VITAMIN C) 500 MG tablet 1/2 TABLET BY MOUTH JACOBSON WITH IRON SUPPLEMENT BEFORE MEAL 45 tablet 3     VITAMIN D3 25 MCG (1000 UT) tablet TAKE 1 TABLET BY MOUTH EVERY DAY 90 tablet 3       Allergies  Allergies   Allergen Reactions     Morphine Sulfate Itching         Family History  family history includes Cancer in her maternal grandfather and paternal grandfather; Cerebrovascular Disease in her mother; Diabetes in her brother, father, mother, and sister; Hypertension in her father; Multiple Sclerosis in her daughter; Thyroid Disease in her daughter and sister.    Social History  Social History     Tobacco Use     Smoking status: Former     Current packs/day: 0.00     Types: Cigarettes     Quit date: 1974     Years since quittin.7     Passive exposure: Never     Smokeless tobacco: Never   Vaping Use     Vaping status: Never Used   Substance Use Topics     Alcohol use: Yes     Alcohol/week: 0.0 standard drinks of alcohol     Comment: social occasions     Drug use: No       Physical Exam  /73 (BP Location: Left arm, Patient Position: Sitting, Cuff Size: Adult Large)   Pulse 78   Resp 18   Wt 87.5 kg (193 lb)   SpO2 100%   BMI 35.30 kg/m    Body mass index is 35.3 kg/m .  GENERAL :  In no apparent distress  SKIN: Normal color, normal temperature,  texture.  No hirsutism, alopecia or purple striae.     EYES: PERRL, EOMI, No scleral icterus,  No proptosis, conjunctival redness, stare, retraction  RESP: Lungs clear to auscultation bilaterally  CARDIAC: Regular rate and rhythm, normal S1 S2, without murmurs, rubs or gallops    NEURO: awake, alert, responds appropriately to questions.  Cranial nerves intact.   Moves all extremities; Gait normal with the help of a cane  No tremor of the outstretched hand.     EXTREMITIES: No clubbing, cyanosis or edema.    DATA REVIEW  She does not have recent Mathieu readings to review        Again, thank you for allowing me to participate in the care of your patient.        Sincerely,        Kelly Yan PA-C

## 2024-09-19 NOTE — PATIENT INSTRUCTIONS
Welcome to the [facility] Endocrinology and Diabetes Clinics     Our Endocrinology Clinics are here to provide you with a team-based, collaborative approach in the diagnosis and treatment of patients with diabetes and endocrine disorders. The team is made up of Physicians, Physician Assistants, Certified Diabetes Educators, Registered Nurses, Medical Assistants, Emergency Medical Technicians, and many others, all of whom have the unified goal of providing our patients with high quality care.     Please see below for some helpful tips to best navigate and use the [facility] Endocrinology clinic:     [place] Respect: At [facility], we are committed to a respectful and safe space for all patients, visitors, and staff.  We believe that mutual respect between patients and their care team is the foundation of quality care.  It is our expectation that you will be treated with respect by your care team.  In turn, we ask that all communication with the care team (written and verbal) be respectful and free from profanity, threatening, or abusive language.  Disrespectful communication undermines our therapeutic relationship with you and may result in us being unable to continue to provide your care.    Refills: A provider must see you at least annually to prescribe and refill medications. This is to ensure your safety as well as meet insurance and compliance regulations.    Scheduling: Many of our Providers offer both in-person or video visits. Please call to schedule any needed follow ups as soon as possible because our provider schedules fill up very quickly. Our care team has the right to require an in-person visit when they believe that it is medically necessary. Please remember that for any virtual visits, you must be in the [facility] at the time of the visit, otherwise we are unable to see you and you will need to be rescheduled.    Missed Appointments: If you need to cancel or miss your  scheduled appointment, please call the clinic at [phone] to reschedule.  Please note if you repeatedly miss appointments or repeatedly miss appointments without calling to inform us ahead of time (no-show), the clinic may elect to not allow you to reschedule without speaking to a manager, may require a Partnership In Care Agreement prior to rescheduling, or could result in you no longer being able to receive care from the clinic. Providing the clinic with timely notification if you have to miss an appointment, allows us to better serve the needs of all of our patients.    Primary Care Provider: Our Endocrinologists are Specialists in their field. We expect you to have a Primary Care Provider established to handle any needs outside of your diabetes and endocrine care.  We would be happy to assist you find a Primary Care Provider, if you do not have one.    [employer]: [employer] is a wonderful resource that allows you access to your Care Team via online or the luke. Please ask a member of the team if you would like help creating an account. Please note that it may take up to 2 business days for a response. [employer] messages are not reviewed on weekends or after business hours.  Emergent or urgent care needs should never be communicated via [employer].  If you experience a medical emergency call 911 or go to the nearest emergency room.    Labs: It is recommended that you stay within the [facility] for labs but you are welcome to obtain ordered labs (with some exceptions) from any location of your choice as long as they are able to complete and process the needed labs. If you need us to fax orders to your preferred lab, please provide us the name and fax number of the lab you would like to go to so we can fax the orders. If your labs are drawn outside of the [facility], please have them fax the results to [phone] ([place]) or [phone] ([place]) or via [facility]. It is your  responsibility to ensure that outside lab results are sent to us.    We look forward to working with you. Please do not hesitate to reach out with any questions.    Thank you,    The Endocrine Team    [facility] Address:   [place] Address:     [address] [place] [address]    Phone: [phone]  Fax: [phone] [address] [place] [address]    Phone: [phone]  Fax: [phone]     [facility] Cost Estimate Phone Number: [phone]    General Lab and Imaging Scheduling Phone Number: [phone]

## 2024-09-19 NOTE — NURSING NOTE
Holly Muir's goals for this visit include:   Chief Complaint   Patient presents with    Follow Up     DM       She requests these members of her care team be copied on today's visit information: yes    PCP: Tiffany King    Referring Provider:  Referred Self, MD  No address on file    /73 (BP Location: Left arm, Patient Position: Sitting, Cuff Size: Adult Large)   Pulse 78   Resp 18   Wt 87.5 kg (193 lb)   SpO2 100%   BMI 35.30 kg/m      Do you need any medication refills at today's visit? Yes    Seth Claudio, EMT

## 2024-09-19 NOTE — PROGRESS NOTES
Assessment/Plan :   Type 2 DM. Holly is doing okay. She was worried that her hemoglobin A1C was going to be elevated but it had actually improved from previous. We reviewed her current medications and she seems to be tolerating the Mounjaro, well. She did just recently increase to 7.5 mg weekly and this should help to further improve her blood sugars. We reviewed the possible adverse effects and if she has any problems, she is to contact our office. I encouraged her to continue to work on diet, as well. We will follow-up in 4 mos.  Vaccination. She requested to flu vaccine at today's visit.       I have independently reviewed and interpreted labs, imaging as indicated.      Chief complaint:  Holly is a 69 year old female seen in consultation at the request of   I have reviewed Care Everywhere including [facility], [facility], [facility], [facility], [facility] , [facility], [facility] lab reports, imaging reports and provider notes as indicated.      HISTORY OF PRESENT ILLNESS  Holly continues to work on managing her blood sugars. She was worried that her hemoglobin A1C was going to be up since she has not been as consistent with her diet. She has been taking her medication, as directed, and she was glad to hear that her A1C has actually improved from previous. At present time she is taking Lantus 20 unit(s), pioglitazone 45 mg daily, metformin 2000 mg daily, glimepiride 4 mg daily, and she recently increased Mounjaro to 7.5 mg weekly. She also states that her children and grandchildren have been trying to help her stay on track.    Holly was monitoring her blood sugars with the FreeStyle 2 sensor. She was unable to  the sensor last week, so she has been using fingerstick testing to monitor blood sugars, recently. Holly has not had any problems with her vision or an increase in numbness/tingling in her feet. She also has not had any adverse reaction to the Mounjaro. She has a history of  nausea and bloating but she seems to be tolerating the Mounjaro, well.     Holly was diagnosed with diabetes many years ago. She has used a combination of insulin and oral medications, for some time. She has struggled to keep her hemoglobin A1C to goal, in the past. She does understand the importance of diet and exercise in managing diabetes but she struggles with food insecurity. She states that she has been going to a new food pantry through the [employer] and that it has more options for fresh vegetables. She would really like to lose weight and she feels like that would help with her overall health. Her diabetes is complicated by obesity, hyperlipidemia, CKD stage 3, GERD, schizoaffective disorder, and osteoarthritis.     Endocrine relevant labs are as follows:   Latest Reference Range & Units 09/19/24 14:20   Afinion Hemoglobin A1c POCT <=5.7 % 7.7 (H)   (H): Data is abnormally high   Latest Reference Range & Units 05/23/24 10:44   Afinion Hemoglobin A1c POCT <=5.7 % 7.9 (H)   (H): Data is abnormally high   Latest Reference Range & Units 05/23/24 11:25   Albumin Urine mg/g Cr 0.00 - 25.00 mg/g Cr 98.92 (H)   (H): Data is abnormally high   Latest Reference Range & Units 05/23/24 11:25   Albumin Urine mg/L mg/L 91.8     REVIEW OF SYSTEMS    Endocrine: positive for diabetes and obesity  Skin: negative  Eyes: negative for, visual blurring, redness, tearing  Ears/Nose/Throat: negative  Respiratory: No shortness of breath, dyspnea on exertion, cough, or hemoptysis  Cardiovascular: negative for, chest pain, dyspnea on exertion, lower extremity edema, and exercise intolerance  Gastrointestinal: negative for, nausea, vomiting, constipation, and diarrhea  Genitourinary: negative for, nocturia, dysuria, frequency, and urgency  Musculoskeletal: negative for, muscular weakness, nocturnal cramping, and foot pain  Neurologic: positive for numbness or tingling of feet, negative for, local weakness, and numbness or  tingling of hands  Psychiatric: negative  Hematologic/Lymphatic/Immunologic: negative    Past Medical History  Past Medical History:   Diagnosis Date    Bilateral knee pain     Cataracts, both eyes 5/8/2013    Cervical cancer (H)     Chronic kidney disease, stage 3a (H) 11/17/2021    Diabetes     Diabetic retinopathy (H)     HTN     Inflammatory arthritis     Major depression     Memory loss     Nephropathy     OA (osteoarthritis) of knee 9/11/2013    Other and unspecified hyperlipidemia     Pterygium eye     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       Medications  Current Outpatient Medications   Medication Sig Dispense Refill    acetaminophen (TYLENOL) 325 MG tablet Take 2 tablets (650 mg) by mouth every 4 hours as needed for other (For optimal non-opioid multimodal pain management to improve pain control.) 100 tablet 0    blood glucose monitoring (NO BRAND SPECIFIED) meter device kit Use to test blood sugar 4  times daily or as directed. Accuceck Guide meter 1 kit 1    blood glucose monitoring (ONE TOUCH ULTRA 2) meter device kit USE TO TEST BLOOD SUGAR 4 TIMES DAILY OR AS DIRECTED. ACCUCECK GUIDE METER      blood glucose monitoring (ULTRA THIN 30G) lancets Use to test blood sugar 4  times daily or as directed.lancets for acchu check guide 400 each 3    buPROPion (WELLBUTRIN SR) 200 MG 12 hr tablet Take 1 tablet (200 mg) by mouth daily (with breakfast). 30 tablet 5    busPIRone HCl (BUSPAR) 30 MG tablet TAKE 1 TABLET BY MOUTH TWICE A DAY 60 tablet 5    cariprazine (VRAYLAR) 4.5 MG capsule Take 1 capsule (4.5 mg) by mouth daily. 30 capsule 5    cetirizine (ZYRTEC) 10 MG tablet Take 1 tablet (10 mg) by mouth daily 30 tablet 5    Continuous Glucose  (FREESTYLE CM 2 READER) GALINA 1 DOSE CONTINUOUS 1 each 0    Continuous Glucose Sensor (FREESTYLE CM 2 SENSOR) MISC Use one sensor every 14 days to read blood sugar 9 each 3    CVS ASPIRIN ADULT LOW DOSE 81 MG chewable tablet TAKE 1 TABLET BY MOUTH EVERY  DAY 90 tablet 1    diclofenac (VOLTAREN) 1 % topical gel Apply 4 g topically 4 times daily 350 g 3    ferrous gluconate (FERGON) 324 (38 Fe) MG tablet Take 1 tablet (324 mg) by mouth daily (with breakfast) 90 tablet 3    gabapentin (NEURONTIN) 600 MG tablet TAKE 2 TABLETS (1,200 MG) BY MOUTH 3 TIMES DAILY FOR 30 DAYS 180 tablet 5    glimepiride (AMARYL) 2 MG tablet Take 2 tablets (4 mg) by mouth every morning (before breakfast). 360 tablet 3    insulin glargine (LANTUS SOLOSTAR) 100 UNIT/ML pen INJECT 20 UNITS AT BEDTIME AS DIRECTED + 2 UNITS FOR PRIMING OF PEN. 30 mL 3    insulin pen needle (31G X 5 MM) 31G X 5 MM miscellaneous Use four times 4 day times a day 200 each 3    metFORMIN (GLUCOPHAGE XR) 500 MG 24 hr tablet Take 2 tablets (1,000 mg) by mouth 2 times daily (with meals) Take two tablets with breakfast and one tablet with dinner for one week then increase to two tablets with breakfast and two tablets with dinner thereafter 360 tablet 3    mirtazapine (REMERON) 7.5 MG tablet Take 1 tablet (7.5 mg) by mouth at bedtime. 30 tablet 5    Multiple Vitamin (DAILY-ISAAC MULTIVITAMIN) TABS TAKE 1 TABLET BY MOUTH EVERY DAY 90 tablet 0    nystatin (MYCOSTATIN) 895979 UNIT/GM external powder Apply 1 dose topically 3 times daily as needed 60 g 3    omeprazole (PRILOSEC) 20 MG DR capsule TAKE 2 CAPSULES (40 MG) BY MOUTH DAILY *TAKE 30-60 MINUTES BEFORE A MEAL* 180 capsule 2    ONETOUCH ULTRA test strip USE TO TEST BLOOD SUGAR 4 TIMES DAILY OR AS DIRECTED. ACCU CHECK GUIDE STRIPS 400 strip 3    PARoxetine (PAXIL) 40 MG tablet Take 1 tablet (40 mg) by mouth every evening. 30 tablet 5    pioglitazone (ACTOS) 45 MG tablet Take 1 tablet (45 mg) by mouth daily 90 tablet 1    propranolol ER (INDERAL LA) 80 MG 24 hr capsule TAKE 1 CAPSULE BY MOUTH EVERY DAY 90 capsule 2    ramipril (ALTACE) 5 MG capsule TAKE 1 CAPSULE BY MOUTH EVERY DAY 90 capsule 1    rOPINIRole (REQUIP) 0.25 MG tablet Take 1 tablet (0.25 mg) by mouth every  evening 90 tablet 0    simvastatin (ZOCOR) 20 MG tablet TAKE 1 TABLET BY MOUTH EVERYDAY AT BEDTIME 90 tablet 0    tirzepatide (MOUNJARO) 7.5 MG/0.5ML pen Inject 7.5 mg subcutaneously every 7 days. 2 mL 0    vitamin C (CVS VITAMIN C) 500 MG tablet 1/2 TABLET BY MOUTH JACOBSON WITH IRON SUPPLEMENT BEFORE MEAL 45 tablet 3    VITAMIN D3 25 MCG (1000 UT) tablet TAKE 1 TABLET BY MOUTH EVERY DAY 90 tablet 3       Allergies  Allergies   Allergen Reactions    Morphine Sulfate Itching         Family History  family history includes Cancer in her maternal grandfather and paternal grandfather; Cerebrovascular Disease in her mother; Diabetes in her brother, father, mother, and sister; Hypertension in her father; Multiple Sclerosis in her daughter; Thyroid Disease in her daughter and sister.    Social History  Social History     Tobacco Use    Smoking status: Former     Current packs/day: 0.00     Types: Cigarettes     Quit date: 1974     Years since quittin.7     Passive exposure: Never    Smokeless tobacco: Never   Vaping Use    Vaping status: Never Used   Substance Use Topics    Alcohol use: Yes     Alcohol/week: 0.0 standard drinks of alcohol     Comment: social occasions    Drug use: No       Physical Exam  /73 (BP Location: Left arm, Patient Position: Sitting, Cuff Size: Adult Large)   Pulse 78   Resp 18   Wt 87.5 kg (193 lb)   SpO2 100%   BMI 35.30 kg/m    Body mass index is 35.3 kg/m .  GENERAL :  In no apparent distress  SKIN: Normal color, normal temperature, texture.  No hirsutism, alopecia or purple striae.     EYES: PERRL, EOMI, No scleral icterus,  No proptosis, conjunctival redness, stare, retraction  RESP: Lungs clear to auscultation bilaterally  CARDIAC: Regular rate and rhythm, normal S1 S2, without murmurs, rubs or gallops    NEURO: awake, alert, responds appropriately to questions.  Cranial nerves intact.   Moves all extremities; Gait normal with the help of a cane  No tremor of the  outstretched hand.     EXTREMITIES: No clubbing, cyanosis or edema.    DATA REVIEW  She does not have recent Mathieu readings to review

## 2024-09-23 DIAGNOSIS — G89.29 CHRONIC PAIN OF LEFT KNEE: Primary | ICD-10-CM

## 2024-09-23 DIAGNOSIS — M25.562 CHRONIC PAIN OF LEFT KNEE: Primary | ICD-10-CM

## 2024-09-24 ENCOUNTER — TELEPHONE (OUTPATIENT)
Dept: PHARMACY | Facility: CLINIC | Age: 69
End: 2024-09-24
Payer: COMMERCIAL

## 2024-09-24 DIAGNOSIS — Z79.4 TYPE 2 DIABETES MELLITUS WITHOUT COMPLICATION, WITH LONG-TERM CURRENT USE OF INSULIN (H): ICD-10-CM

## 2024-09-24 DIAGNOSIS — E11.9 TYPE 2 DIABETES MELLITUS WITHOUT COMPLICATION, WITH LONG-TERM CURRENT USE OF INSULIN (H): ICD-10-CM

## 2024-09-24 RX ORDER — TIRZEPATIDE 7.5 MG/.5ML
7.5 INJECTION, SOLUTION SUBCUTANEOUS
Qty: 2 ML | Refills: 0 | Status: SHIPPED | OUTPATIENT
Start: 2024-09-24

## 2024-09-24 NOTE — TELEPHONE ENCOUNTER
Patient called stating her pharmacy cannot get Mounjaro 7.5mg. Will send prescription to [facility] pharmacy.    Liz Calero, Pharm.D, [facility]  Medication Therapy Management Pharmacist

## 2024-10-02 NOTE — TELEPHONE ENCOUNTER
Forms/Letter Request    Type of form/letter: [facility] Order [id number]    Have you been seen for this request: N/A    Do we have the form/letter: Yes: Will place form on providers desk for review/signature    When is form/letter needed by: asap    How would you like the form/letter returned: Fax : [phone]       Patient baseline mental status

## 2024-10-08 ENCOUNTER — ANCILLARY PROCEDURE (OUTPATIENT)
Dept: GENERAL RADIOLOGY | Facility: CLINIC | Age: 69
End: 2024-10-08
Attending: ORTHOPAEDIC SURGERY
Payer: COMMERCIAL

## 2024-10-08 ENCOUNTER — TELEPHONE (OUTPATIENT)
Dept: ORTHOPEDICS | Facility: CLINIC | Age: 69
End: 2024-10-08

## 2024-10-08 ENCOUNTER — OFFICE VISIT (OUTPATIENT)
Dept: ORTHOPEDICS | Facility: CLINIC | Age: 69
End: 2024-10-08
Payer: COMMERCIAL

## 2024-10-08 VITALS — HEIGHT: 63 IN | WEIGHT: 191 LBS | BODY MASS INDEX: 33.84 KG/M2

## 2024-10-08 DIAGNOSIS — M25.562 CHRONIC PAIN OF LEFT KNEE: ICD-10-CM

## 2024-10-08 DIAGNOSIS — M15.0 PRIMARY OSTEOARTHRITIS INVOLVING MULTIPLE JOINTS: Primary | ICD-10-CM

## 2024-10-08 DIAGNOSIS — G89.29 CHRONIC PAIN OF LEFT KNEE: ICD-10-CM

## 2024-10-08 DIAGNOSIS — M17.12 PRIMARY LOCALIZED OSTEOARTHRITIS OF LEFT KNEE: ICD-10-CM

## 2024-10-08 PROCEDURE — 73562 X-RAY EXAM OF KNEE 3: CPT | Mod: LT | Performed by: RADIOLOGY

## 2024-10-08 PROCEDURE — 99214 OFFICE O/P EST MOD 30 MIN: CPT | Performed by: PHYSICIAN ASSISTANT

## 2024-10-08 ASSESSMENT — KOOS JR
STANDING UPRIGHT: EXTREME
BENDING TO THE FLOOR TO PICK UP OBJECT: EXTREME
TWISING OR PIVOTING ON KNEE: EXTREME
GOING UP OR DOWN STAIRS: EXTREME
RISING FROM SITTING: SEVERE
HOW SEVERE IS YOUR KNEE STIFFNESS AFTER FIRST WAKING IN MORNING: EXTREME
KOOS JR SCORING: 28.25

## 2024-10-08 NOTE — NURSING NOTE
"Holly Muir's chief complaint for this visit includes:  Chief Complaint   Patient presents with    Consult     L knee OA        Referring Provider:  Referred Self, MD  No address on file    Ht 1.588 m (5' 2.5\")   Wt 86.6 kg (191 lb)   BMI 34.38 kg/m    Data Unavailable   Global Mental Health Score: (P) 14  Global Physical Health Score: (P) 10  PROMIS TOTAL - SUBSCORES: (P) 24  UCLA: 3  KOOS Jr.  28.25     Pain increases with: Stairs  Previous surgeries: in 72 but doenst remember what  Previous injections within last 6 months: NO  Treatments done: na  Imaging completed: XR today  Pain: 10/10  Concerns: wants to get Left side done. R side is doing great.     Martinez Patel, ATC            "

## 2024-10-08 NOTE — PROGRESS NOTES
Chief Complaint: Consult (L knee OA )         HPI: Holly Muir returns today in follow-up for of her left knee.  She is also 1 year status post right total knee arthroplasty and is very pleased and satisfied with her results from the surgery.  She has no concerns or complaints other than some medial anterior knee numbness that has gradually lessened over time but persists at the distal incision area.    Her left knee has been bothersome for her for the last 1 to 2 years.  She notes a remote trauma in the early 80s from blunt force fall to the anterior knee.  She states she was in a coma at the time and does not recall the specifics around the surgery.  She has a transverse incision overlying the proximal tibia.    Knee pain is diffuse and achy, maximally over the medial knee.  She notes some swelling intermittent.  Pain is worse particularly with steps and she can only do 1 step at a time.  She did have an injection with Dr. Santy Harrington in the last year and notes 1 to 2 months of relief with this.  Additionally, she also sees Dr. Harrington for her back.  She states her back pain is low back and radiates to the posterior buttock mid thigh region.  She had an epidural steroid injection on 5/31/2024 and notes only a few weeks of relief with this.  Her back pain is also limiting her.  She denies pain in the groin.    Pain medication:  Assist device: cane  Physical therapy:        Medications and allergies are documented in the EMR and have been reviewed.      Current Outpatient Medications:     acetaminophen (TYLENOL) 325 MG tablet, Take 2 tablets (650 mg) by mouth every 4 hours as needed for other (For optimal non-opioid multimodal pain management to improve pain control.), Disp: 100 tablet, Rfl: 0    blood glucose monitoring (NO BRAND SPECIFIED) meter device kit, Use to test blood sugar 4  times daily or as directed. Accuceck Guide meter, Disp: 1 kit, Rfl: 1    blood glucose monitoring (ONE TOUCH ULTRA 2) meter device  kit, USE TO TEST BLOOD SUGAR 4 TIMES DAILY OR AS DIRECTED. ACCUCECK GUIDE METER, Disp: , Rfl:     blood glucose monitoring (ULTRA THIN 30G) lancets, Use to test blood sugar 4  times daily or as directed.lancets for acchu check guide, Disp: 400 each, Rfl: 3    buPROPion (WELLBUTRIN SR) 200 MG 12 hr tablet, Take 1 tablet (200 mg) by mouth daily (with breakfast)., Disp: 30 tablet, Rfl: 5    busPIRone HCl (BUSPAR) 30 MG tablet, TAKE 1 TABLET BY MOUTH TWICE A DAY, Disp: 60 tablet, Rfl: 5    cariprazine (VRAYLAR) 4.5 MG capsule, Take 1 capsule (4.5 mg) by mouth daily., Disp: 30 capsule, Rfl: 5    cetirizine (ZYRTEC) 10 MG tablet, Take 1 tablet (10 mg) by mouth daily, Disp: 30 tablet, Rfl: 5    Continuous Glucose  (FREESTYLE CM 2 READER) GALINA, 1 DOSE CONTINUOUS, Disp: 1 each, Rfl: 0    Continuous Glucose Sensor (FREESTYLE CM 2 SENSOR) [facility], Use one sensor every 14 days to read blood sugar, Disp: 9 each, Rfl: 3    CVS ASPIRIN ADULT LOW DOSE 81 MG chewable tablet, TAKE 1 TABLET BY MOUTH EVERY DAY, Disp: 90 tablet, Rfl: 1    diclofenac (VOLTAREN) 1 % topical gel, Apply 4 g topically 4 times daily, Disp: 350 g, Rfl: 3    ferrous gluconate (FERGON) 324 (38 Fe) MG tablet, Take 1 tablet (324 mg) by mouth daily (with breakfast), Disp: 90 tablet, Rfl: 3    gabapentin (NEURONTIN) 600 MG tablet, TAKE 2 TABLETS (1,200 MG) BY MOUTH 3 TIMES DAILY FOR 30 DAYS, Disp: 180 tablet, Rfl: 5    glimepiride (AMARYL) 2 MG tablet, Take 2 tablets (4 mg) by mouth every morning (before breakfast)., Disp: 360 tablet, Rfl: 3    insulin glargine (LANTUS SOLOSTAR) 100 UNIT/ML pen, INJECT 20 UNITS AT BEDTIME AS DIRECTED + 2 UNITS FOR PRIMING OF PEN., Disp: 30 mL, Rfl: 3    insulin pen needle (31G X 5 MM) 31G X 5 MM miscellaneous, Use four times 4 day times a day, Disp: 200 each, Rfl: 3    metFORMIN (GLUCOPHAGE XR) 500 MG 24 hr tablet, Take 2 tablets (1,000 mg) by mouth 2 times daily (with meals) Take two tablets with breakfast and one tablet  with dinner for one week then increase to two tablets with breakfast and two tablets with dinner thereafter (Patient taking differently: Take 1,000 mg by mouth daily (with dinner). Take two tablets with breakfast and one tablet with dinner for one week then increase to two tablets with breakfast and two tablets with dinner thereafter), Disp: 360 tablet, Rfl: 3    mirtazapine (REMERON) 7.5 MG tablet, Take 1 tablet (7.5 mg) by mouth at bedtime., Disp: 30 tablet, Rfl: 5    Multiple Vitamin (DAILY-ISAAC MULTIVITAMIN) TABS, TAKE 1 TABLET BY MOUTH EVERY DAY, Disp: 90 tablet, Rfl: 0    nystatin (MYCOSTATIN) 753795 UNIT/GM external powder, Apply 1 dose topically 3 times daily as needed, Disp: 60 g, Rfl: 3    omeprazole (PRILOSEC) 20 MG DR capsule, TAKE 2 CAPSULES (40 MG) BY MOUTH DAILY *TAKE 30-60 MINUTES BEFORE A MEAL*, Disp: 180 capsule, Rfl: 2    ONETOUCH ULTRA test strip, USE TO TEST BLOOD SUGAR 4 TIMES DAILY OR AS DIRECTED. ACCU CHECK GUIDE STRIPS, Disp: 400 strip, Rfl: 3    PARoxetine (PAXIL) 40 MG tablet, Take 1 tablet (40 mg) by mouth every evening., Disp: 30 tablet, Rfl: 5    pioglitazone (ACTOS) 45 MG tablet, Take 1 tablet (45 mg) by mouth daily, Disp: 90 tablet, Rfl: 1    propranolol ER (INDERAL LA) 80 MG 24 hr capsule, TAKE 1 CAPSULE BY MOUTH EVERY DAY, Disp: 90 capsule, Rfl: 2    ramipril (ALTACE) 5 MG capsule, TAKE 1 CAPSULE BY MOUTH EVERY DAY, Disp: 90 capsule, Rfl: 1    rOPINIRole (REQUIP) 0.25 MG tablet, Take 1 tablet (0.25 mg) by mouth every evening, Disp: 90 tablet, Rfl: 0    simvastatin (ZOCOR) 20 MG tablet, TAKE 1 TABLET BY MOUTH EVERYDAY AT BEDTIME, Disp: 90 tablet, Rfl: 0    tirzepatide (MOUNJARO) 7.5 MG/0.5ML pen, Inject 7.5 mg subcutaneously every 7 days., Disp: 2 mL, Rfl: 0    vitamin C (CVS VITAMIN C) 500 MG tablet, 1/2 TABLET BY MOUTH JACOBSON WITH IRON SUPPLEMENT BEFORE MEAL, Disp: 45 tablet, Rfl: 3    VITAMIN D3 25 MCG (1000 UT) tablet, TAKE 1 TABLET BY MOUTH EVERY DAY, Disp: 90 tablet, Rfl:  "3    Current Facility-Administered Medications:     triamcinolone (KENALOG-40) injection 40 mg, 40 mg, , , Santy Harrington, DO, 40 mg at 01/08/24 1612    Allergies: Morphine sulfate    Current Status:  JOSE LUIS Jr:  UCLA:  Global Mental Health Score - (P) 14  Global Physical Health Score - (P) 10  PROMIS TOTAL - SUBSCORES - (P) 24    Physical Exam:  On physical examination the patient appears the stated age, is in no acute distress, affect is appropriate, and breathing is non-labored.    Ht 1.588 m (5' 2.5\")   Wt 86.6 kg (191 lb)   BMI 34.38 kg/m    Body mass index is 34.38 kg/m .    Rises from chair:  Gait:   Appearance: benign  Clinical alignment:  Effusion:  Tenderness to palpation:  Extension:  Flexion:   Patellar tracking:  Collateral ligaments: intact    Cruciate ligaments: grossly intact     Stable in in the sagittal plane in mid-flexion.    Distally, the circulatory, motor, and sensation exam is intact with 5/5 EHL, gastroc-soleus, and tibialis anterior.  Sensation to light touch is intact.  Dorsalis pedis and posterior tibialis pulses are palpable.  There are no sores on the feet, no bruising, and no lymphedema.    Assessment:     Plan:   Referred Self    "

## 2024-10-08 NOTE — LETTER
10/8/2024      Holly Muir  [address] [facility] [address]      Dear Colleague,    Thank you for referring your patient, Holly Muir, to the [facility]. Please see a copy of my visit note below.    Chief Complaint: Consult (L knee OA )         HPI: Holly Muir returns today in follow-up for of her left knee.  She is also 1 year status post right total knee arthroplasty and is very pleased and satisfied with her results from the surgery.  She has no concerns or complaints other than some medial anterior knee numbness that has gradually lessened over time but persists at the distal incision area.    Her left knee has been bothersome for her for the last 1 to 2 years.  She notes a remote trauma in the early 80s from blunt force fall to the anterior knee.  She states she was in a coma at the time and does not recall the specifics around the surgery.  She has a transverse incision overlying the proximal tibia.    Knee pain is diffuse and achy, maximally over the medial knee.  She notes some swelling intermittent.  Pain is worse particularly with steps and she can only do 1 step at a time.  She did have an injection with Dr. Santy Harrington in the last year and notes 1 to 2 months of relief with this.  Additionally, she also sees Dr. Harrington for her back.  She states her back pain is low back and radiates to the posterior buttock mid thigh region.  She had an epidural steroid injection on 5/31/2024 and notes only a few weeks of relief with this.  Her back pain is also limiting her.  She denies pain in the groin.    Pain medication:  Assist device: cane  Physical therapy:        Medications and allergies are documented in the EMR and have been reviewed.      Current Outpatient Medications:      acetaminophen (TYLENOL) 325 MG tablet, Take 2 tablets (650 mg) by mouth every 4 hours as needed for other (For optimal non-opioid multimodal pain management to improve pain control.),  Disp: 100 tablet, Rfl: 0     blood glucose monitoring (NO BRAND SPECIFIED) meter device kit, Use to test blood sugar 4  times daily or as directed. Accuceck Guide meter, Disp: 1 kit, Rfl: 1     blood glucose monitoring (ONE TOUCH ULTRA 2) meter device kit, USE TO TEST BLOOD SUGAR 4 TIMES DAILY OR AS DIRECTED. ACCUCECK GUIDE METER, Disp: , Rfl:      blood glucose monitoring (ULTRA THIN 30G) lancets, Use to test blood sugar 4  times daily or as directed.lancets for acchu check guide, Disp: 400 each, Rfl: 3     buPROPion (WELLBUTRIN SR) 200 MG 12 hr tablet, Take 1 tablet (200 mg) by mouth daily (with breakfast)., Disp: 30 tablet, Rfl: 5     busPIRone HCl (BUSPAR) 30 MG tablet, TAKE 1 TABLET BY MOUTH TWICE A DAY, Disp: 60 tablet, Rfl: 5     cariprazine (VRAYLAR) 4.5 MG capsule, Take 1 capsule (4.5 mg) by mouth daily., Disp: 30 capsule, Rfl: 5     cetirizine (ZYRTEC) 10 MG tablet, Take 1 tablet (10 mg) by mouth daily, Disp: 30 tablet, Rfl: 5     Continuous Glucose  (FREESTYLE CM 2 READER) GALINA, 1 DOSE CONTINUOUS, Disp: 1 each, Rfl: 0     Continuous Glucose Sensor (FREESTYLE CM 2 SENSOR) [facility] [place] Use one sensor every 14 days to read blood sugar, Disp: 9 each, Rfl: 3     [employer]S ASPIRIN ADULT LOW DOSE 81 MG chewable tablet, TAKE 1 TABLET BY MOUTH EVERY DAY, Disp: 90 tablet, Rfl: 1     diclofenac (VOLTAREN) 1 % topical gel, Apply 4 g topically 4 times daily, Disp: 350 g, Rfl: 3     ferrous gluconate (FERGON) 324 (38 Fe) MG tablet, Take 1 tablet (324 mg) by mouth daily (with breakfast), Disp: 90 tablet, Rfl: 3     gabapentin (NEURONTIN) 600 MG tablet, TAKE 2 TABLETS (1,200 MG) BY MOUTH 3 TIMES DAILY FOR 30 DAYS, Disp: 180 tablet, Rfl: 5     glimepiride (AMARYL) 2 MG tablet, Take 2 tablets (4 mg) by mouth every morning (before breakfast)., Disp: 360 tablet, Rfl: 3     insulin glargine (LANTUS SOLOSTAR) 100 UNIT/ML pen, INJECT 20 UNITS AT BEDTIME AS DIRECTED + 2 UNITS FOR PRIMING OF PEN., Disp: 30 mL, Rfl: 3     insulin  pen needle (31G X 5 MM) 31G X 5 MM miscellaneous, Use four times 4 day times a day, Disp: 200 each, Rfl: 3     metFORMIN (GLUCOPHAGE XR) 500 MG 24 hr tablet, Take 2 tablets (1,000 mg) by mouth 2 times daily (with meals) Take two tablets with breakfast and one tablet with dinner for one week then increase to two tablets with breakfast and two tablets with dinner thereafter (Patient taking differently: Take 1,000 mg by mouth daily (with dinner). Take two tablets with breakfast and one tablet with dinner for one week then increase to two tablets with breakfast and two tablets with dinner thereafter), Disp: 360 tablet, Rfl: 3     mirtazapine (REMERON) 7.5 MG tablet, Take 1 tablet (7.5 mg) by mouth at bedtime., Disp: 30 tablet, Rfl: 5     Multiple Vitamin (DAILY-ISAAC MULTIVITAMIN) TABS, TAKE 1 TABLET BY MOUTH EVERY DAY, Disp: 90 tablet, Rfl: 0     nystatin (MYCOSTATIN) 062567 UNIT/GM external powder, Apply 1 dose topically 3 times daily as needed, Disp: 60 g, Rfl: 3     omeprazole (PRILOSEC) 20 MG DR capsule, TAKE 2 CAPSULES (40 MG) BY MOUTH DAILY *TAKE 30-60 MINUTES BEFORE A MEAL*, Disp: 180 capsule, Rfl: 2     ONETOUCH ULTRA test strip, USE TO TEST BLOOD SUGAR 4 TIMES DAILY OR AS DIRECTED. ACCU CHECK GUIDE STRIPS, Disp: 400 strip, Rfl: 3     PARoxetine (PAXIL) 40 MG tablet, Take 1 tablet (40 mg) by mouth every evening., Disp: 30 tablet, Rfl: 5     pioglitazone (ACTOS) 45 MG tablet, Take 1 tablet (45 mg) by mouth daily, Disp: 90 tablet, Rfl: 1     propranolol ER (INDERAL LA) 80 MG 24 hr capsule, TAKE 1 CAPSULE BY MOUTH EVERY DAY, Disp: 90 capsule, Rfl: 2     ramipril (ALTACE) 5 MG capsule, TAKE 1 CAPSULE BY MOUTH EVERY DAY, Disp: 90 capsule, Rfl: 1     rOPINIRole (REQUIP) 0.25 MG tablet, Take 1 tablet (0.25 mg) by mouth every evening, Disp: 90 tablet, Rfl: 0     simvastatin (ZOCOR) 20 MG tablet, TAKE 1 TABLET BY MOUTH EVERYDAY AT BEDTIME, Disp: 90 tablet, Rfl: 0     tirzepatide (MOUNJARO) 7.5 MG/0.5ML pen, Inject 7.5 mg  "subcutaneously every 7 days., Disp: 2 mL, Rfl: 0     vitamin C (CVS VITAMIN C) 500 MG tablet, 1/2 TABLET BY MOUTH JACOBSON WITH IRON SUPPLEMENT BEFORE MEAL, Disp: 45 tablet, Rfl: 3     VITAMIN D3 25 MCG (1000 UT) tablet, TAKE 1 TABLET BY MOUTH EVERY DAY, Disp: 90 tablet, Rfl: 3    Current Facility-Administered Medications:      triamcinolone (KENALOG-40) injection 40 mg, 40 mg, , , Santy Harrington, DO, 40 mg at 01/08/24 1612    Allergies: Morphine sulfate    Current Status:  KOOS Jr:  UCLA:  Global Mental Health Score - (P) 14  Global Physical Health Score - (P) 10  PROMIS TOTAL - SUBSCORES - (P) 24    Physical Exam:  On physical examination the patient appears the stated age, is in no acute distress, affect is appropriate, and breathing is non-labored.    Ht 1.588 m (5' 2.5\")   Wt 86.6 kg (191 lb)   BMI 34.38 kg/m    Body mass index is 34.38 kg/m .    Rises from chair:  Gait:   Appearance: benign  Clinical alignment:  Effusion:  Tenderness to palpation:  Extension:  Flexion:   Patellar tracking:  Collateral ligaments: intact    Cruciate ligaments: grossly intact     Stable in in the sagittal plane in mid-flexion.    Distally, the circulatory, motor, and sensation exam is intact with 5/5 EHL, gastroc-soleus, and tibialis anterior.  Sensation to light touch is intact.  Dorsalis pedis and posterior tibialis pulses are palpable.  There are no sores on the feet, no bruising, and no lymphedema.    Assessment:     Plan:   Referred Self      Assessment: This is a 69 year old with left knee osteoarthritis primarily involving the patellofemoral compartment with end stage changes of this compartment, as well as joint space narrowing of the medial compartment.     Plan:      We discussed treatment options of the left knee including continuing conservative management with activity modification, bracing, use of over-the-counter analgesics, and physical therapy.these measures will not change her underlying disease process any " advanced arthritic changes seen at the patellofemoral compartment.  We also discussed surgical options of proceeding with left total knee arthroplasty.  She is very interested in proceeding with this as she is pleased and did well with her right total knee arthroplasty.  We will make arrangements for this and she had a preoperative teaching course with nursing today.  We reviewed risks of surgery including infection which is a greater risk for her especially if her diabetes is not well-controlled.  She also has a greater risk of incision healing due to previous surgery and transverse incision. Discussed risks to extensor mechanism due to history of surgery and the apparent non-union of inferior pole and we went over what a loss of extensor mechanism looks like and the options that are available to address that and their success rates.  Reviewed risks of DVT or PE, increased for her given her history of DVT in the right leg.  Will plan to put her on Eliquis following surgery which she was on previously.  We also discussed the risk of nerve or blood vessel injury, risk risk of anesthesia, and risk of hardware complication or injury during the surgery. She is aware and wishes to proceed.       Chief Complaint: Consult (L knee OA )      Physician:  Referred Self    HPI: Holly Muir returns today in follow-up for of her left knee.  She is also 1 year status post right total knee arthroplasty and is very pleased and satisfied with her results from the surgery.  She has no concerns or complaints other than some medial anterior knee numbness that has gradually lessened over time but persists at the distal incision area.    Her left knee has been bothersome for her for the last 1 to 2 years.  She notes a remote trauma in the early 80s from blunt force fall to the anterior knee.  She states she was in a coma at the time and does not recall the specifics around the surgery.  She has a transverse incision overlying the  proximal tibia.    Knee pain is diffuse and achy, maximally over the medial knee.  She notes some swelling intermittent.  Pain is worse particularly with steps and she can only do 1 step at a time.  She did have an injection with Dr. Santy Harrington in the last year and notes 1 to 2 months of relief with this.  Additionally, she also sees Dr. Harrington for her back.  She states her back pain is low back and radiates to the posterior buttock mid thigh region.  She had an L5-S1 epidural steroid injection on 5/31/2024 and notes only a few weeks of relief with this.  Her back pain is also limiting her.  She denies pain in the groin.    She is diabetic, recent A1C 7.7 and recent visit with endocrinology increased Mounjaro.     Symptom Profile  Location of symptoms:  superiorlateral, anterior medial knee  Onset: 1-2 years  Trend: getting   Duration of symptoms: daily  Quality of symptoms: aching, sharp/stabbing  Severity: 10/10  Alleviate: activity modification  Exacerbating: activities  Previous Treatments: Previous treatments include activity modification, oral pain medication    JESSICA Jr: 28.25  PROMIS Mental: (P) 14  PROMIS Physical (P) 10  PROMIS TOTAL (P) 24  UCLA: 3    MEDICAL HISTORY:   Past Medical History:   Diagnosis Date     Bilateral knee pain      Cataracts, both eyes 5/8/2013     Cervical cancer (H)      Chronic kidney disease, stage 3a (H) 11/17/2021     Diabetes      Diabetic retinopathy (H)      HTN      Inflammatory arthritis      Major depression      Memory loss      Nephropathy      OA (osteoarthritis) of knee 9/11/2013     Other and unspecified hyperlipidemia      Pterygium eye      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 Accuceck Guide meter, Disp: 1 kit, Rfl: 1     blood glucose monitoring (ONE TOUCH ULTRA 2) meter device kit, USE TO TEST BLOOD SUGAR 4 TIMES DAILY OR AS DIRECTED. ACCUCECK GUIDE METER, Disp: , Rfl:      blood glucose monitoring (ULTRA THIN 30G) lancets, Use to test blood sugar 4  times daily or as directed.lancets for acchu check guide, Disp: 400 each, Rfl: 3     buPROPion (WELLBUTRIN SR) 200 MG 12 hr tablet, Take 1 tablet (200 mg) by mouth daily (with breakfast)., Disp: 30 tablet, Rfl: 5     busPIRone HCl (BUSPAR) 30 MG tablet, TAKE 1 TABLET BY MOUTH TWICE A DAY, Disp: 60 tablet, Rfl: 5     cariprazine (VRAYLAR) 4.5 MG capsule, Take 1 capsule (4.5 mg) by mouth daily., Disp: 30 capsule, Rfl: 5     cetirizine (ZYRTEC) 10 MG tablet, Take 1 tablet (10 mg) by mouth daily, Disp: 30 tablet, Rfl: 5     Continuous Glucose  (FREESTYLE CM 2 READER) GALINA, 1 DOSE CONTINUOUS, Disp: 1 each, Rfl: 0     Continuous Glucose Sensor (FREESTYLE CM 2 SENSOR) [facility] [place], Use one sensor every 14 days to read blood sugar, Disp: 9 each, Rfl: 3     CVS ASPIRIN ADULT LOW DOSE 81 MG chewable tablet, TAKE 1 TABLET BY MOUTH EVERY DAY, Disp: 90 tablet, Rfl: 1     diclofenac (VOLTAREN) 1 % topical gel, Apply 4 g topically 4 times daily, Disp: 350 g, Rfl: 3     ferrous gluconate (FERGON) 324 (38 Fe) MG tablet, Take 1 tablet (324 mg) by mouth daily (with breakfast), Disp: 90 tablet, Rfl: 3     gabapentin (NEURONTIN) 600 MG tablet, TAKE 2 TABLETS (1,200 MG) BY MOUTH 3 TIMES DAILY FOR 30 DAYS, Disp: 180 tablet, Rfl: 5     glimepiride (AMARYL) 2 MG tablet, Take 2 tablets (4 mg) by mouth every morning (before breakfast)., Disp: 360 tablet, Rfl: 3     insulin glargine (LANTUS SOLOSTAR) 100 UNIT/ML pen, INJECT 20 UNITS AT BEDTIME AS DIRECTED + 2 UNITS FOR PRIMING OF PEN., Disp: 30 mL, Rfl: 3     insulin pen needle (31G X 5 MM) 31G X 5 MM  miscellaneous, Use four times 4 day times a day, Disp: 200 each, Rfl: 3     metFORMIN (GLUCOPHAGE XR) 500 MG 24 hr tablet, Take 2 tablets (1,000 mg) by mouth 2 times daily (with meals) Take two tablets with breakfast and one tablet with dinner for one week then increase to two tablets with breakfast and two tablets with dinner thereafter (Patient taking differently: Take 1,000 mg by mouth daily (with dinner). Take two tablets with breakfast and one tablet with dinner for one week then increase to two tablets with breakfast and two tablets with dinner thereafter), Disp: 360 tablet, Rfl: 3     mirtazapine (REMERON) 7.5 MG tablet, Take 1 tablet (7.5 mg) by mouth at bedtime., Disp: 30 tablet, Rfl: 5     Multiple Vitamin (DAILY-ISAAC MULTIVITAMIN) TABS, TAKE 1 TABLET BY MOUTH EVERY DAY, Disp: 90 tablet, Rfl: 0     nystatin (MYCOSTATIN) 709812 UNIT/GM external powder, Apply 1 dose topically 3 times daily as needed, Disp: 60 g, Rfl: 3     omeprazole (PRILOSEC) 20 MG DR capsule, TAKE 2 CAPSULES (40 MG) BY MOUTH DAILY *TAKE 30-60 MINUTES BEFORE A MEAL*, Disp: 180 capsule, Rfl: 2     ONETOUCH ULTRA test strip, USE TO TEST BLOOD SUGAR 4 TIMES DAILY OR AS DIRECTED. ACCU CHECK GUIDE STRIPS, Disp: 400 strip, Rfl: 3     PARoxetine (PAXIL) 40 MG tablet, Take 1 tablet (40 mg) by mouth every evening., Disp: 30 tablet, Rfl: 5     pioglitazone (ACTOS) 45 MG tablet, Take 1 tablet (45 mg) by mouth daily, Disp: 90 tablet, Rfl: 1     propranolol ER (INDERAL LA) 80 MG 24 hr capsule, TAKE 1 CAPSULE BY MOUTH EVERY DAY, Disp: 90 capsule, Rfl: 2     ramipril (ALTACE) 5 MG capsule, TAKE 1 CAPSULE BY MOUTH EVERY DAY, Disp: 90 capsule, Rfl: 1     rOPINIRole (REQUIP) 0.25 MG tablet, Take 1 tablet (0.25 mg) by mouth every evening, Disp: 90 tablet, Rfl: 0     simvastatin (ZOCOR) 20 MG tablet, TAKE 1 TABLET BY MOUTH EVERYDAY AT BEDTIME, Disp: 90 tablet, Rfl: 0     tirzepatide (MOUNJARO) 7.5 MG/0.5ML pen, Inject 7.5 mg subcutaneously every 7 days.,  Disp: 2 mL, Rfl: 0     vitamin C (CVS VITAMIN C) 500 MG tablet, 1/2 TABLET BY MOUTH JACOBSON WITH IRON SUPPLEMENT BEFORE MEAL, Disp: 45 tablet, Rfl: 3     VITAMIN D3 25 MCG (1000 UT) tablet, TAKE 1 TABLET BY MOUTH EVERY DAY, Disp: 90 tablet, Rfl: 3    Current Facility-Administered Medications:      triamcinolone (KENALOG-40) injection 40 mg, 40 mg, , , Santy Harrington, DO, 40 mg at 01/08/24 1612    Allergies: Morphine sulfate    SURGICAL HISTORY:   Past Surgical History:   Procedure Laterality Date     ARTHROPLASTY KNEE Right 10/2/2023    Procedure: ARTHROPLASTY, RIGHT KNEE, TOTAL;  Surgeon: Sanford Sheehan MD;  Location: [facility]     ARTHROSCOPY KNEE RT/LT      LT     BLEPHAROPLASTY BILATERAL Bilateral 8/16/2019    Procedure: BILATERAL UPPER LID BLEPHAROPLASTY;  Surgeon: Randolph Cook MD;  Location: [facility] OR     BREAST LUMPECTOMY, RT/LT      LT-benign      CATARACT IOL, RT/LT       COLONOSCOPY  5/10/2012    Procedure:COLONOSCOPY; screening colonoscopy; Surgeon:MILLA VEGA; Location:[facility] OR     COLONOSCOPY WITH CO2 INSUFFLATION N/A 8/2/2019    Procedure: Colonoscopy with CO2 insufflation;  Surgeon: Himanshu Hi MD;  Location: [facility] OR     COLOSTOMY  2000     COLOSTOMY       HYSTERECTOMY, PAP NO LONGER INDICATED      done in [place]-cervical cancer     IMPLANT STIMULATOR SACRAL NERVE STAGE ONE Right 3/10/2015    Procedure: IMPLANT STIMULATOR SACRAL NERVE STAGE ONE;  Surgeon: Teodora Yusuf MD;  [facility]     IMPLANT STIMULATOR SACRAL NERVE STAGE TWO Right 3/31/2015    Procedure: IMPLANT STIMULATOR SACRAL NERVE STAGE TWO;  Surgeon: Teodora Yusuf MD;  [facility]     IMPLANT STIMULATOR SACRAL NERVE STAGE TWO Right 6/22/2020    Procedure: INSERTION, SACRAL NERVE STIMULATOR, STAGE 2 (replacement of interstim battery);  Surgeon: Teodora Yusuf MD;  Location: [facility]     INJECT EPIDURAL TRANSFORAMINAL Right 1/20/2023    Procedure: Right Lumbar 5-Sacral 1 Transforaminal Epidural  Steroid Injection with fluoroscopic guidance and contrast;  Surgeon: Kulwinder Booth MD;  Location: [facility] OR     INJECT EPIDURAL TRANSFORAMINAL Right 3/23/2023    Procedure: Right Lumbar 5-Sacral 1 Transforaminal Epidural Steroid Injection with fluoroscopic guidance and contrast.;  Surgeon: Kulwinder Booth MD;  Location: [facility]     PHACOEMULSIFICATION WITH STANDARD INTRAOCULAR LENS IMPLANT Left 2019    Procedure: LEFT PHACOEMULSIFICATION, CATARACT, WITH STANDARD IOL INSERTION;  Surgeon: Francesco Winn MD;  [facility]     PHACOEMULSIFICATION WITH STANDARD INTRAOCULAR LENS IMPLANT Right 10/24/2019    Procedure: RIGHT PHACOEMULSIFICATION, CATARACT, WITH STANDARD IOL INSERTION;  Surgeon: Francesco Winn MD;  [facility]: [facility]     REPAIR PTOSIS Bilateral 2019     SALPINGO OOPHORECTOMY,R/L/JENNIFER      Salpingo Oophorectomy, RT/LT/JENNIFER     ZZC EXCIS PTERYGIUM  10/08    Jayne Eye     ZZC STOMACH SURGERY PROCEDURE UNLISTED       [facility] COLONOSCOPY THRU STOMA, DIAGNOSTIC      normal per report-no records available-records destroyed       FAMILY HISTORY:   Family History   Problem Relation Age of Onset     Diabetes Mother      Cerebrovascular Disease Mother      Diabetes Father      Hypertension Father      Cancer Maternal Grandfather      Cancer Paternal Grandfather      Diabetes Brother      Diabetes Sister      Thyroid Disease Daughter      Thyroid Disease Sister      Multiple Sclerosis Daughter      Glaucoma No family hx of      Macular Degeneration No family hx of        SOCIAL HISTORY:   Social History     Tobacco Use     Smoking status: Former     Current packs/day: 0.00     Types: Cigarettes     Quit date: 1974     Years since quittin.8     Passive exposure: Never     Smokeless tobacco: Never   Substance Use Topics     Alcohol use: Yes     Alcohol/week: 0.0 standard drinks of alcohol     Comment: social occasions       REVIEW OF SYSTEMS:  The comprehensive review of systems from  "the intake form was reviewed with the patient.  No fever, weight change or fatigue. No dry eyes. No oral ulcers, sore throat or voice change. No palpitations, syncope, angina or edema.  No chest pain, excessive sleepiness, shortness of breath or hemoptysis.   No abdominal pain, nausea, vomiting, diarrhea or heartburn.  No skin rash. No focal weakness or numbness. No bleeding or lymphadenopathy. No rhinitis or hives.     Exam:  On physical examination the patient appears the stated age, is in no acute distress, affect is appropriate, and breathing is non-labored.  Vitals are documented in the EMR and have been reviewed:    Ht 1.588 m (5' 2.5\")   Wt 86.6 kg (191 lb)   BMI 34.38 kg/m    5' 2.5\"  Body mass index is 34.38 kg/m .    Rises from chair: with effort, use of cane  Gait: slow, use of cane  Gains the exam table: with small assist    Right  Knee  Appearance: benign  Clinical alignment:  Effusion: none  Tenderness to palpation: no tenderness about the knee  Extension:0  Flexion: 115  Collateral ligaments: intact, mild laxity with varus stress  Cruciate ligaments: grossly intact   Hyposensitivity anteriormedial lower leg from distal knee incision area to mid shin.     Left Knee  Appearance: benign  Clinical alignment:  Effusion: none  Tenderness to palpation: superior lateral patella femoral joint, medial plica, medial tibiofemoral jointline  Extension:5  Flexion: 110  Excellent strength with straight leg raise.   Collateral ligaments: intact  Cruciate ligaments: grossly intact     Hip examination: benign hip ROM with groin or anterior thigh symptoms.     Distally, the circulatory, motor, and sensation exam is intact with 5/5 EHL, gastroc-soleus, and tibialis anterior.    Sensation to light touch is intact with stocking glove distribution to the level of the ankle  Dorsalis pedis and posterior tibialis pulses are palpable.    There are no sores on the feet, no bruising, and no lymphedema.    X-rays:   AP, lateral " and sunrise views of the left knee are ordered, performed and interpreted today.  These demonstrate grade 4 degenerative changes of the patellofemoral compartment.  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 Suspect correlation with previous surgery following traumatic event. Grade 3 changes of the medial compartment.         Again, thank you for allowing me to participate in the care of your patient.        Sincerely,        Sanford Sheehan MD

## 2024-10-08 NOTE — TELEPHONE ENCOUNTER
Procedure: LTKA  Facility: [facility]  Length: 120 minutes  Anesthesia: Choice  Post-op appointments needed: 2 weeks provider only, 6 weeks with provider only.  Surgery packet/instructions given to patient?  Yes      Pre-Operative Teaching Flowsheet     Person(s) involved in teaching: Patient     Motivation Level:  Receptive (willing/able to accept information) and asks appropriate questions where applicable: Yes  Any cultural factors/[employer] beliefs that may influence understanding or compliance? No     Patient demonstrates understanding of the following:  Pre-operative planning, including the necessary appointments and preparation needed prior to surgery: Yes  Which situations necessitate calling provider and whom to contact: Yes  Pain management techniques pre and post op: Yes  How, and when, to access community resources: Yes    Who will drive and stay/ with patient after surgery: daughter who is PCA  Pre-op exam [facility]  PT to be completed at          Additional Teaching Concerns Addressed:   Post-operative living arrangements and necessary adaptations to living environment.     Instructional Materials Used/Given: Yes, pre-op packet given including forms for Your surgery day, preparing for surgery, showering before surgery, Stop light tool introduced, Opioid pain medication guideline, pre-op physical form, and map  Patient expressed understanding of all forms given, questions were answered and will review in more detail at home.     Time spent with patient: 20 minutes.

## 2024-10-08 NOTE — PROGRESS NOTES
Assessment: This is a 69 year old with left knee osteoarthritis primarily involving the patellofemoral compartment with end stage changes of this compartment, as well as joint space narrowing of the medial compartment.     Plan:      We discussed treatment options of the left knee including continuing conservative management with activity modification, bracing, use of over-the-counter analgesics, and physical therapy.these measures will not change her underlying disease process any advanced arthritic changes seen at the patellofemoral compartment.  We also discussed surgical options of proceeding with left total knee arthroplasty.  She is very interested in proceeding with this as she is pleased and did well with her right total knee arthroplasty.  We will make arrangements for this and she had a preoperative teaching course with nursing today.  We reviewed risks of surgery including infection which is a greater risk for her especially if her diabetes is not well-controlled.  She also has a greater risk of incision healing due to previous surgery and transverse incision. Discussed risks to extensor mechanism due to history of surgery and the apparent non-union of inferior pole and we went over what a loss of extensor mechanism looks like and the options that are available to address that and their success rates.  Reviewed risks of DVT or PE, increased for her given her history of DVT in the right leg.  Will plan to put her on Eliquis following surgery which she was on previously.  We also discussed the risk of nerve or blood vessel injury, risk risk of anesthesia, and risk of hardware complication or injury during the surgery. She is aware and wishes to proceed.       Chief Complaint: Consult (L knee OA )      Physician:  Referred Self    HPI: Holly Muir returns today in follow-up for of her left knee.  She is also 1 year status post right total knee arthroplasty and is very pleased and satisfied with her  results from the surgery.  She has no concerns or complaints other than some medial anterior knee numbness that has gradually lessened over time but persists at the distal incision area.    Her left knee has been bothersome for her for the last 1 to 2 years.  She notes a remote trauma in the early 80s from blunt force fall to the anterior knee.  She states she was in a coma at the time and does not recall the specifics around the surgery.  She has a transverse incision overlying the proximal tibia.    Knee pain is diffuse and achy, maximally over the medial knee.  She notes some swelling intermittent.  Pain is worse particularly with steps and she can only do 1 step at a time.  She did have an injection with Dr. Santy Harrington in the last year and notes 1 to 2 months of relief with this.  Additionally, she also sees Dr. Harrington for her back.  She states her back pain is low back and radiates to the posterior buttock mid thigh region.  She had an L5-S1 epidural steroid injection on 5/31/2024 and notes only a few weeks of relief with this.  Her back pain is also limiting her.  She denies pain in the groin.    She is diabetic, recent A1C 7.7 and recent visit with endocrinology increased Mounjaro.     Symptom Profile  Location of symptoms:  superiorlateral, anterior medial knee  Onset: 1-2 years  Trend: getting   Duration of symptoms: daily  Quality of symptoms: aching, sharp/stabbing  Severity: 10/10  Alleviate: activity modification  Exacerbating: activities  Previous Treatments: Previous treatments include activity modification, oral pain medication    JESSICA Jr: 28.25  PROMIS Mental: (P) 14  PROMIS Physical (P) 10  PROMIS TOTAL (P) 24  [facility]: 3    MEDICAL HISTORY:   Past Medical History:   Diagnosis Date    Bilateral knee pain     Cataracts, both eyes 5/8/2013    Cervical cancer (H)     Chronic kidney disease, stage 3a (H) 11/17/2021    Diabetes     Diabetic retinopathy (H)     HTN     Inflammatory arthritis     Major  depression     Memory loss     Nephropathy     OA (osteoarthritis) of knee 9/11/2013    Other and unspecified hyperlipidemia     Pterygium eye     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       Medications:     Current Outpatient Medications:     acetaminophen (TYLENOL) 325 MG tablet, Take 2 tablets (650 mg) by mouth every 4 hours as needed for other (For optimal non-opioid multimodal pain management to improve pain control.), Disp: 100 tablet, Rfl: 0    blood glucose monitoring (NO BRAND SPECIFIED) meter device kit, Use to test blood sugar 4  times daily or as directed. Accuceck Guide meter, Disp: 1 kit, Rfl: 1    blood glucose monitoring (ONE TOUCH ULTRA 2) meter device kit, USE TO TEST BLOOD SUGAR 4 TIMES DAILY OR AS DIRECTED. ACCUCECK GUIDE METER, Disp: , Rfl:     blood glucose monitoring (ULTRA THIN 30G) lancets, Use to test blood sugar 4  times daily or as directed.lancets for acchu check guide, Disp: 400 each, Rfl: 3    buPROPion (WELLBUTRIN SR) 200 MG 12 hr tablet, Take 1 tablet (200 mg) by mouth daily (with breakfast)., Disp: 30 tablet, Rfl: 5    busPIRone HCl (BUSPAR) 30 MG tablet, TAKE 1 TABLET BY MOUTH TWICE A DAY, Disp: 60 tablet, Rfl: 5    cariprazine (VRAYLAR) 4.5 MG capsule, Take 1 capsule (4.5 mg) by mouth daily., Disp: 30 capsule, Rfl: 5    cetirizine (ZYRTEC) 10 MG tablet, Take 1 tablet (10 mg) by mouth daily, Disp: 30 tablet, Rfl: 5    Continuous Glucose  (FREESTYLE CM 2 READER) [facility], 1 DOSE CONTINUOUS, Disp: 1 each, Rfl: 0    Continuous Glucose Sensor (FREESTYLE CM 2 SENSOR) [facility], Use one sensor every 14 days to read blood sugar, Disp: 9 each, Rfl: 3    [employer] ASPIRIN ADULT LOW DOSE 81 MG chewable tablet, TAKE 1 TABLET BY MOUTH EVERY DAY, Disp: 90 tablet, Rfl: 1    diclofenac (VOLTAREN) 1 % topical gel, Apply 4 g topically 4 times daily, Disp: 350 g, Rfl: 3    ferrous gluconate (FERGON) 324 (38 Fe) MG tablet, Take 1 tablet (324 mg) by mouth daily (with breakfast), Disp: 90 tablet,  Rfl: 3    gabapentin (NEURONTIN) 600 MG tablet, TAKE 2 TABLETS (1,200 MG) BY MOUTH 3 TIMES DAILY FOR 30 DAYS, Disp: 180 tablet, Rfl: 5    glimepiride (AMARYL) 2 MG tablet, Take 2 tablets (4 mg) by mouth every morning (before breakfast)., Disp: 360 tablet, Rfl: 3    insulin glargine (LANTUS SOLOSTAR) 100 UNIT/ML pen, INJECT 20 UNITS AT BEDTIME AS DIRECTED + 2 UNITS FOR PRIMING OF PEN., Disp: 30 mL, Rfl: 3    insulin pen needle (31G X 5 MM) 31G X 5 MM miscellaneous, Use four times 4 day times a day, Disp: 200 each, Rfl: 3    metFORMIN (GLUCOPHAGE XR) 500 MG 24 hr tablet, Take 2 tablets (1,000 mg) by mouth 2 times daily (with meals) Take two tablets with breakfast and one tablet with dinner for one week then increase to two tablets with breakfast and two tablets with dinner thereafter (Patient taking differently: Take 1,000 mg by mouth daily (with dinner). Take two tablets with breakfast and one tablet with dinner for one week then increase to two tablets with breakfast and two tablets with dinner thereafter), Disp: 360 tablet, Rfl: 3    mirtazapine (REMERON) 7.5 MG tablet, Take 1 tablet (7.5 mg) by mouth at bedtime., Disp: 30 tablet, Rfl: 5    Multiple Vitamin (DAILY-ISAAC MULTIVITAMIN) TABS, TAKE 1 TABLET BY MOUTH EVERY DAY, Disp: 90 tablet, Rfl: 0    nystatin (MYCOSTATIN) 241008 UNIT/GM external powder, Apply 1 dose topically 3 times daily as needed, Disp: 60 g, Rfl: 3    omeprazole (PRILOSEC) 20 MG DR capsule, TAKE 2 CAPSULES (40 MG) BY MOUTH DAILY *TAKE 30-60 MINUTES BEFORE A MEAL*, Disp: 180 capsule, Rfl: 2    ONETOUCH ULTRA test strip, USE TO TEST BLOOD SUGAR 4 TIMES DAILY OR AS DIRECTED. ACCU CHECK GUIDE STRIPS, Disp: 400 strip, Rfl: 3    PARoxetine (PAXIL) 40 MG tablet, Take 1 tablet (40 mg) by mouth every evening., Disp: 30 tablet, Rfl: 5    pioglitazone (ACTOS) 45 MG tablet, Take 1 tablet (45 mg) by mouth daily, Disp: 90 tablet, Rfl: 1    propranolol ER (INDERAL LA) 80 MG 24 hr capsule, TAKE 1 CAPSULE BY  MOUTH EVERY DAY, Disp: 90 capsule, Rfl: 2    ramipril (ALTACE) 5 MG capsule, TAKE 1 CAPSULE BY MOUTH EVERY DAY, Disp: 90 capsule, Rfl: 1    rOPINIRole (REQUIP) 0.25 MG tablet, Take 1 tablet (0.25 mg) by mouth every evening, Disp: 90 tablet, Rfl: 0    simvastatin (ZOCOR) 20 MG tablet, TAKE 1 TABLET BY MOUTH EVERYDAY AT BEDTIME, Disp: 90 tablet, Rfl: 0    tirzepatide (MOUNJARO) 7.5 MG/0.5ML pen, Inject 7.5 mg subcutaneously every 7 days., Disp: 2 mL, Rfl: 0    vitamin C (CVS VITAMIN C) 500 MG tablet, 1/2 TABLET BY MOUTH JACOBSON WITH IRON SUPPLEMENT BEFORE MEAL, Disp: 45 tablet, Rfl: 3    VITAMIN D3 25 MCG (1000 UT) tablet, TAKE 1 TABLET BY MOUTH EVERY DAY, Disp: 90 tablet, Rfl: 3    Current Facility-Administered Medications:     triamcinolone (KENALOG-40) injection 40 mg, 40 mg, , , Santy Harrington, DO, 40 mg at 01/08/24 1612    Allergies: Morphine sulfate    SURGICAL HISTORY:   Past Surgical History:   Procedure Laterality Date    ARTHROPLASTY KNEE Right 10/2/2023    Procedure: ARTHROPLASTY, RIGHT KNEE, TOTAL;  Surgeon: Sanford Sheehan MD;  [facility]: [facility]    ARTHROSCOPY KNEE RT/LT      LT    BLEPHAROPLASTY BILATERAL Bilateral 8/16/2019    Procedure: BILATERAL UPPER LID BLEPHAROPLASTY;  Surgeon: Randolph Cook MD;  Location: [facility] OR    BREAST LUMPECTOMY, RT/LT      LT-benign     CATARACT IOL, RT/LT      COLONOSCOPY  5/10/2012    Procedure:COLONOSCOPY; screening colonoscopy; Surgeon:MILLA VEGA; Location:[facility]    COLONOSCOPY WITH CO2 INSUFFLATION N/A 8/2/2019    Procedure: Colonoscopy with CO2 insufflation;  Surgeon: Himanshu Hi MD;  Location: [facility]    COLOSTOMY  2000    COLOSTOMY      HYSTERECTOMY, PAP NO LONGER INDICATED      done in [place]-cervical cancer    IMPLANT STIMULATOR SACRAL NERVE STAGE ONE Right 3/10/2015    Procedure: IMPLANT STIMULATOR SACRAL NERVE STAGE ONE;  Surgeon: Teodora Yusuf MD;  [facility]    IMPLANT STIMULATOR SACRAL NERVE STAGE TWO Right 3/31/2015     Procedure: IMPLANT STIMULATOR SACRAL NERVE STAGE TWO;  Surgeon: Teodora Yusuf MD;  [facility]    IMPLANT STIMULATOR SACRAL NERVE STAGE TWO Right 6/22/2020    Procedure: INSERTION, SACRAL NERVE STIMULATOR, STAGE 2 (replacement of interstim battery);  Surgeon: Teodora Yusuf MD;  [facility]    INJECT EPIDURAL TRANSFORAMINAL Right 1/20/2023    Procedure: Right Lumbar 5-Sacral 1 Transforaminal Epidural Steroid Injection with fluoroscopic guidance and contrast;  Surgeon: Kulwinder Booth MD;  Location: [facility]    INJECT EPIDURAL TRANSFORAMINAL Right 3/23/2023    Procedure: Right Lumbar 5-Sacral 1 Transforaminal Epidural Steroid Injection with fluoroscopic guidance and contrast.;  Surgeon: Kulwinder Booth MD;  [facility]    PHACOEMULSIFICATION WITH STANDARD INTRAOCULAR LENS IMPLANT Left 9/26/2019    Procedure: LEFT PHACOEMULSIFICATION, CATARACT, WITH STANDARD IOL INSERTION;  Surgeon: Francesoc Winn MD;  [facility]    PHACOEMULSIFICATION WITH STANDARD INTRAOCULAR LENS IMPLANT Right 10/24/2019    Procedure: RIGHT PHACOEMULSIFICATION, CATARACT, WITH STANDARD IOL INSERTION;  Surgeon: Francesco Winn MD;  [facility]    REPAIR PTOSIS Bilateral 08/16/2019    SALPINGO OOPHORECTOMY,R/L/JENNIFER      Salpingo Oophorectomy, RT/LT/JENNIFER    ZZC EXCIS PTERYGIUM  10/08    [facility]    [facility] STOMACH SURGERY PROCEDURE UNLISTED      ZZHC COLONOSCOPY THRU STOMA, DIAGNOSTIC  2002    normal per report-no records available-records destroyed       FAMILY HISTORY:   Family History   Problem Relation Age of Onset    Diabetes Mother     Cerebrovascular Disease Mother     Diabetes Father     Hypertension Father     Cancer Maternal Grandfather     Cancer Paternal Grandfather     Diabetes Brother     Diabetes Sister     Thyroid Disease Daughter     Thyroid Disease Sister     Multiple Sclerosis Daughter     Glaucoma No family hx of     Macular Degeneration No family hx of        SOCIAL HISTORY:   Social  "History     Tobacco Use    Smoking status: Former     Current packs/day: 0.00     Types: Cigarettes     Quit date: 1974     Years since quittin.8     Passive exposure: Never    Smokeless tobacco: Never   Substance Use Topics    Alcohol use: Yes     Alcohol/week: 0.0 standard drinks of alcohol     Comment: social occasions       REVIEW OF SYSTEMS:  The comprehensive review of systems from the intake form was reviewed with the patient.  No fever, weight change or fatigue. No dry eyes. No oral ulcers, sore throat or voice change. No palpitations, syncope, angina or edema.  No chest pain, excessive sleepiness, shortness of breath or hemoptysis.   No abdominal pain, nausea, vomiting, diarrhea or heartburn.  No skin rash. No focal weakness or numbness. No bleeding or lymphadenopathy. No rhinitis or hives.     Exam:  On physical examination the patient appears the stated age, is in no acute distress, affect is appropriate, and breathing is non-labored.  Vitals are documented in the EMR and have been reviewed:    Ht 1.588 m (5' 2.5\")   Wt 86.6 kg (191 lb)   BMI 34.38 kg/m    5' 2.5\"  Body mass index is 34.38 kg/m .    Rises from chair: with effort, use of cane  Gait: slow, use of cane  Gains the exam table: with small assist    Right  Knee  Appearance: benign  Clinical alignment:  Effusion: none  Tenderness to palpation: no tenderness about the knee  Extension:0  Flexion: 115  Collateral ligaments: intact, mild laxity with varus stress  Cruciate ligaments: grossly intact   Hyposensitivity anteriormedial lower leg from distal knee incision area to mid shin.     Left Knee  Appearance: benign  Clinical alignment:  Effusion: none  Tenderness to palpation: superior lateral patella femoral joint, medial plica, medial tibiofemoral jointline  Extension:5  Flexion: 110  Excellent strength with straight leg raise.   Collateral ligaments: intact  Cruciate ligaments: grossly intact     Hip examination: benign hip ROM with " groin or anterior thigh symptoms.     Distally, the circulatory, motor, and sensation exam is intact with 5/5 EHL, gastroc-soleus, and tibialis anterior.    Sensation to light touch is intact with stocking glove distribution to the level of the ankle  Dorsalis pedis and posterior tibialis pulses are palpable.    There are no sores on the feet, no bruising, and no lymphedema.    X-rays:   AP, lateral and sunrise views of the left knee are ordered, performed and interpreted today.  These demonstrate grade 4 degenerative changes of the patellofemoral compartment.  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 Suspect correlation with previous surgery following traumatic event. Grade 3 changes of the medial compartment.

## 2024-10-12 DIAGNOSIS — E11.9 TYPE 2 DIABETES MELLITUS WITHOUT COMPLICATION, WITH LONG-TERM CURRENT USE OF INSULIN (H): ICD-10-CM

## 2024-10-12 DIAGNOSIS — Z79.4 TYPE 2 DIABETES MELLITUS WITHOUT COMPLICATION, WITH LONG-TERM CURRENT USE OF INSULIN (H): ICD-10-CM

## 2024-10-14 ENCOUNTER — OFFICE VISIT (OUTPATIENT)
Dept: AUDIOLOGY | Facility: CLINIC | Age: 69
End: 2024-10-14
Payer: COMMERCIAL

## 2024-10-14 DIAGNOSIS — H90.3 SENSORINEURAL HEARING LOSS (SNHL) OF BOTH EARS: Primary | ICD-10-CM

## 2024-10-14 PROCEDURE — 92557 COMPREHENSIVE HEARING TEST: CPT | Performed by: AUDIOLOGIST

## 2024-10-14 PROCEDURE — 92567 TYMPANOMETRY: CPT | Performed by: AUDIOLOGIST

## 2024-10-14 PROCEDURE — V5266 BATTERY FOR HEARING DEVICE: HCPCS | Performed by: AUDIOLOGIST

## 2024-10-14 NOTE — PROGRESS NOTES
AUDIOLOGY REPORT    BACKGROUND INFORMATION: Holly Muir was seen in the Audiology Clinic at [facility] on 10/14/2024 for follow-up.  The patient has been seen previously in this clinic on 3/08/2022 and results revealed a mild to moderate sensorineural hearing loss bilaterally.  The patient was fit with Phonak Audeo P70-R hearing aids on 6/01/2022.  The patient reports that she cannot pair her hearing aids with her phone and that her left ear seems to be quieter than her right.    TEST RESULTS AND PROCEDURES: An electroacoustic hearing aid check was performed.  Adjustments were made including cleaning the devices and the patient reported that the left ear seemed to still be quieter. The hearing aid conformity evaluation was completed. This includes initially evaluating the devices electroacoustically and later matching NAL-NL2 target with soft sounds audible, moderate sounds comfortable and clear, and loud sounds below discomfort. The patient's hearing aids were paired with her phone for streaming audio.     Fall Risk Screen:  1. Have you fallen two or more times in the past year? Yes  2. Have you fallen and had an injury in the past year? No  Patient is scheduled for follow up with her primary care provider    Otoscopic exam indicates ears are clear of cerumen bilaterally     Pure Tone Thresholds assessed using conventional audiometry with good  reliability from 250-8000 Hz bilaterally using insert earphones and circumaural headphones     RIGHT:  mild sloping to moderate sensorineural hearing loss    LEFT:    mild sloping to moderate sensorineural hearing loss    Tympanogram:    RIGHT: shallow eardrum mobility    LEFT:   shallow eardrum mobility      Speech Reception Threshold:    RIGHT: 30 dB HL    LEFT:   30 dB HL  Word Recognition Score:     RIGHT: 100% at 70 dB HL using NU-6 recorded word list.    LEFT:   100% at 70 dB HL using NU-6 recorded word list.      ASSESSMENT:     ICD-10-CM    1.  Sensorineural hearing loss (SNHL) of both ears  H90.3 Cmprhn Audiometry Thrshld Eval & Speech Recog (33827)     Tympanometry (impedance - testing) (06427)     Battery for Use in Hearing Device ()          Compared to patient's previous audiogram dated 3/08/2022, hearing has remained stable overall. Today s results were discussed with the patient in detail.     At the patient's request overall gain was increased by approximately 2 dB in her left ear and she reported much more balanced hearing.     At the patient's request a 90-day supply of hearing device batteries was provided.        SUMMARY AND RECOMMENDATIONS: A hearing aid check and hearing evaluation was performed today and the patient reports good sound with her hearing aids following cleaning and adjustment. .  Adjustments were made as noted above and the patient will return as needed or at least every 4-6 months for cleaning and assessment of hearing aid.  Call this clinic with questions regarding today s visit.    Cherie Powers.  Doctor of Audiology  [place] License # [id number]

## 2024-10-14 NOTE — PROGRESS NOTES
Medication Therapy Management (MTM) Encounter    ASSESSMENT:                            Medication Adherence/Access: See below for considerations    Diabetes:   Patient is not meeting A1c goal of < 7%.May benefit from increasing Mounjaro to 10mg for weight loss and blood sugar control. With increasing Mounjaro dose and low blood sugars overnight will decrease glimepiride dose.     PLAN:                          Recommend Mounjaro 10mg weekly  Decrease glimepiride to 1mg once daily  Call clinic with any low blood sugar readings    Follow-up: 1 month    SUBJECTIVE/OBJECTIVE:                          Holly Muir is a 69 year old female seen for a follow-up visit.  Today's visit is a follow-up MTM visit from 9/17/2024.     Reason for visit:  blood sugars    Allergies/ADRs: Reviewed in chart  Past Medical History: Reviewed in chart  Tobacco: She reports that she quit smoking about 47 years ago. Her smoking use included cigarettes. She started smoking about 48 years ago. She has never used smokeless tobacco.  Alcohol: not currently using  Lives between two daughters.     Medication Adherence/Access: daughters help manage medications. Uses a pill box.   Per patient, daughters hand her her medications to her.  Missed 2-3 days of insulin in the last week due to falling asleep  Has timer on her phone to remind her when to take her insulin.  The [facility] at [place]: [facility]    Diabetes   Type 2 Diabetes:    Lantus 20 units daily  Mounjaro 7.5mg weekly-has had 4 doses  Actos 45mg daily  Glimepiride 2mg once daily  Metformin XR 1000mg twice daily  Not taking aspirin due to Apixiban    Patient is not experiencing side effects.  Blood sugar monitoring: CGM-  7 day average 176    3am-163  6am-192  12pm 211  3pm 201  6pm 157  9pm 165  12am 158    Time in Target 53% >240mg-10% 36% Low 1%     Current diabetes symptoms: Blood sugars are dropping low over night and  her daughter is having to have her eat a PB&J.  Diet/Exercise:   Appetite is less, when out to eat only eats half of a burger  Has been trying to walk more and goes  to the senior center, walking, chair leg exercises.  Drinks water, rarely has a soda  Follows with endocrinology  Daughters say she looks like she has lost weight.     Eye exam in the last 12 months? No  Foot exam: Yes     ----------------  I spent 18 minutes with this patient today. All changes were made via collaborative practice agreement with Dr. King. A copy of the visit note was provided to the patient's provider(s).    A summary of these recommendations was sent via [employer].    Telemedicine Visit Details  The patient's medications can be safely assessed via a telemedicine encounter.  Type of service:  Telephone visit  Originating Location (pt. Location): Home    Distant Location (provider location):  On-site  Start Time: 11:02 AM  End Time: 11:20 AM     Medication Therapy Recommendations  Type 2 diabetes mellitus with hyperglycemia, with long-term current use of insulin (H)    Current Medication: glimepiride (AMARYL) 2 MG tablet (Discontinued)   Rationale: Dose too high - Dosage too high - Safety   Recommendation: Decrease Dose   Status: Accepted per CPA          Current Medication: tirzepatide (MOUNJARO) 7.5 MG/0.5ML pen (Discontinued)   Rationale: Dose too low - Dosage too low - Effectiveness   Recommendation: Increase Dose   Status: Accepted per CPA

## 2024-10-15 ENCOUNTER — VIRTUAL VISIT (OUTPATIENT)
Dept: PHARMACY | Facility: CLINIC | Age: 69
End: 2024-10-15
Payer: COMMERCIAL

## 2024-10-15 ENCOUNTER — OFFICE VISIT (OUTPATIENT)
Dept: ORTHOPEDICS | Facility: CLINIC | Age: 69
End: 2024-10-15
Payer: COMMERCIAL

## 2024-10-15 DIAGNOSIS — Z79.4 TYPE 2 DIABETES MELLITUS WITHOUT COMPLICATION, WITH LONG-TERM CURRENT USE OF INSULIN (H): Primary | ICD-10-CM

## 2024-10-15 DIAGNOSIS — E11.9 TYPE 2 DIABETES MELLITUS WITHOUT COMPLICATION, WITH LONG-TERM CURRENT USE OF INSULIN (H): Primary | ICD-10-CM

## 2024-10-15 DIAGNOSIS — M17.12 PRIMARY OSTEOARTHRITIS OF LEFT KNEE: Primary | ICD-10-CM

## 2024-10-15 PROCEDURE — 99214 OFFICE O/P EST MOD 30 MIN: CPT | Performed by: FAMILY MEDICINE

## 2024-10-15 PROCEDURE — 99606 MTMS BY PHARM EST 15 MIN: CPT | Mod: 93 | Performed by: PHARMACIST

## 2024-10-15 PROCEDURE — 99607 MTMS BY PHARM ADDL 15 MIN: CPT | Mod: 93 | Performed by: PHARMACIST

## 2024-10-15 RX ORDER — GLIMEPIRIDE 1 MG/1
1 TABLET ORAL
Status: SHIPPED
Start: 2024-10-15

## 2024-10-15 RX ORDER — OXYCODONE AND ACETAMINOPHEN 5; 325 MG/1; MG/1
1 TABLET ORAL EVERY 6 HOURS PRN
Qty: 30 TABLET | Refills: 0 | Status: SHIPPED | OUTPATIENT
Start: 2024-10-15 | End: 2024-11-11

## 2024-10-15 ASSESSMENT — PAIN SCALES - GENERAL: PAINLEVEL: MODERATE PAIN (4)

## 2024-10-15 NOTE — NURSING NOTE
Chief Complaint   Patient presents with    Left Knee - Pain, Follow Up     Left knee pain       There were no vitals filed for this visit.    There is no height or weight on file to calculate BMI.      LYNDSAY Maya NREMT

## 2024-10-15 NOTE — PATIENT INSTRUCTIONS
"Recommendations from today's MTM visit:                                                         Recommend Mounjaro 10mg weekly  Decrease glimepiride to 1mg once daily  Call clinic with any low blood sugar readings    Follow-up: 4 weeks    It was great speaking with you today.  I value your experience and would be very thankful for your time in providing feedback in our clinic survey. In the next few days, you may receive an email or text message from [facility] with a link to a survey related to your  clinical pharmacist.\"     To schedule another MTM appointment, please call the clinic directly or you may call the MTM scheduling line at [phone] or toll-free at [phone].     My Clinical Pharmacist's contact information:                                                      Please feel free to contact me with any questions or concerns you have.      Liz Calero, Pharm.D, [facility]  Medication Therapy Management Pharmacist        "

## 2024-10-15 NOTE — PROGRESS NOTES
HISTORY OF PRESENT ILLNESS  Ms. Muir is a pleasant 69 year old female following up with left knee osteoarthritis.  Holly has been experiencing ongoing, severe pain in her left knee.  She is planning on having a left knee replacement.  Due to the IV fluid shortage she is not sure when this may happen.  She is interested in pain control.     PHYSICAL EXAM  General  - normal appearance, in no obvious distress  Musculoskeletal - left knee  - stance: antalgic gait  - inspection: 1+ effusion  - palpation: medial joint line tenderness  - ROM: 120 degrees flexion, 0 degrees extension, painful active ROM  - strength: 5/5 in flexion, 5/5 in extension  - special tests:  (-) Mahin  (-) varus at 0 and 30 degrees flexion  (-) valgus at 0 and 30 degrees flexion  Neuro  - no sensory or motor deficit, grossly normal coordination, normal muscle tone       ASSESSMENT & PLAN  Ms. Muir is a 69 year old female following up with left knee osteoarthritis.    We reviewed pain control options including oral analgesics, bracing, using her cane, and injections.  We did discuss that ideally we would forego an injection as an injection within a 90-day timeframe from surgery may increase the likelihood of infection.  She is interested in getting her knee replaced as soon as possible.  We did also discuss pain medication, which I do think would be her best option at this point.  I am prescribing her a mild narcotic which she has taken in the past.  We discussed administration, side effects, and what to watch for with regards to efficacy.    She is going to let me know how she is doing and if her medication is not effective.    It was a pleasure seeing Holly.        Santy Harrington, , CAQSM      This note was constructed using Dragon dictation software, please excuse any minor errors in spelling, grammar, or syntax.

## 2024-10-15 NOTE — LETTER
10/15/2024      Holly Muir  [address] [address] [address]      Dear Colleague,    Thank you for referring your patient, Holly Muir, to the [facility] [place]. Please see a copy of my visit note below.    HISTORY OF PRESENT ILLNESS  Ms. Muir is a pleasant 69 year old female following up with left knee osteoarthritis.  Holly has been experiencing ongoing, severe pain in her left knee.  She is planning on having a left knee replacement.  Due to the IV fluid shortage she is not sure when this may happen.  She is interested in pain control.     PHYSICAL EXAM  General  - normal appearance, in no obvious distress  Musculoskeletal - left knee  - stance: antalgic gait  - inspection: 1+ effusion  - palpation: medial joint line tenderness  - ROM: 120 degrees flexion, 0 degrees extension, painful active ROM  - strength: 5/5 in flexion, 5/5 in extension  - special tests:  (-) Mahin  (-) varus at 0 and 30 degrees flexion  (-) valgus at 0 and 30 degrees flexion  Neuro  - no sensory or motor deficit, grossly normal coordination, normal muscle tone       ASSESSMENT & PLAN  Ms. Muir is a 69 year old female following up with left knee osteoarthritis.    We reviewed pain control options including oral analgesics, bracing, using her cane, and injections.  We did discuss that ideally we would forego an injection as an injection within a 90-day timeframe from surgery may increase the likelihood of infection.  She is interested in getting her knee replaced as soon as possible.  We did also discuss pain medication, which I do think would be her best option at this point.  I am prescribing her a mild narcotic which she has taken in the past.  We discussed administration, side effects, and what to watch for with regards to efficacy.    She is going to let me know how she is doing and if her medication is not effective.    It was a pleasure seeing Holly.        Santy Harrington  LANA SANDERSON      This note was constructed using Dragon dictation software, please excuse any minor errors in spelling, grammar, or syntax.      Again, thank you for allowing me to participate in the care of your patient.        Sincerely,        Santy Harrington DO

## 2024-10-20 ENCOUNTER — DOCUMENTATION ONLY (OUTPATIENT)
Dept: LAB | Facility: CLINIC | Age: 69
End: 2024-10-20
Payer: COMMERCIAL

## 2024-10-20 DIAGNOSIS — E78.5 HYPERLIPIDEMIA LDL GOAL <100: Primary | ICD-10-CM

## 2024-10-20 DIAGNOSIS — I10 HTN, GOAL BELOW 140/90: ICD-10-CM

## 2024-10-20 NOTE — PROGRESS NOTES
Holly Muir has an upcoming lab appointment:    Future Appointments   Date Time Provider Department Center   10/25/2024 12:20 PM Joseph Martinez OD [facility]   11/5/2024 12:30 PM Liz Calero, [facility]   11/6/2024  9:30 AM BA LAB ONLY [facility] [place] [facility]   11/8/2024 11:00 AM Tiffany King MD PhD [facility]   1/24/2025  2:10 PM [facility] [place]     Patient is scheduled for the following lab(s):     There is no order available. Please review and place either future orders or HMPO (Review of Health Maintenance Protocol Orders), as appropriate.    Health Maintenance Due   Topic    ANNUAL REVIEW OF HM ORDERS     LIPID     HEMOGLOBIN      Nini Fish, ALONZOT

## 2024-10-22 ENCOUNTER — PATIENT OUTREACH (OUTPATIENT)
Dept: GERIATRIC MEDICINE | Facility: CLINIC | Age: 69
End: 2024-10-22
Payer: COMMERCIAL

## 2024-10-22 DIAGNOSIS — I10 HTN, GOAL BELOW 140/90: ICD-10-CM

## 2024-10-22 RX ORDER — RAMIPRIL 5 MG/1
CAPSULE ORAL
Qty: 90 CAPSULE | Refills: 0 | Status: SHIPPED | OUTPATIENT
Start: 2024-10-22

## 2024-10-22 NOTE — PROGRESS NOTES
[facility] Care Coordination Contact  CC received notification of Emergency Room visit.  ER visit occurred on 10/21/24 at [facility]  with Dx of Diarrhea and Nausea .    CC contacted member and reviewed discharge summary.  Member has a follow-up appointment with PCP: Yes: scheduled on 10/31/24 with  Surgery - Orthopedics / Orthopedic  for her broken toe  Member has had a change in condition: No  New referrals placed: No  Home Visit Needed: No  Support Plan reviewed and updated.  PCP notified of ED visit via EMR.    Malathi Bacon RN PHN  Care Coordinator  [facility]  O: [phone]  C:[phone]  F:[phone]

## 2024-10-23 NOTE — TELEPHONE ENCOUNTER
Date Scheduled: 12-18  Facility: Surgery Locations: [facility]  Surgeon: Dr. Sheehan   Post-op appointment scheduled: 12-31 & 1-28   scheduled?: No  Surgery packet/instructions confirmed received?  No- per daughter please mail a new packet  Pre op physical/PAC appointment: Dr. King  Special Considerations:     Zenaida Umanzor  Surgery Scheduling Coordinator  Ph: [phone]

## 2024-10-25 ENCOUNTER — OFFICE VISIT (OUTPATIENT)
Dept: OPHTHALMOLOGY | Facility: CLINIC | Age: 69
End: 2024-10-25
Payer: COMMERCIAL

## 2024-10-25 DIAGNOSIS — H52.4 PRESBYOPIA: ICD-10-CM

## 2024-10-25 DIAGNOSIS — E11.3293 CONTROLLED TYPE 2 DIABETES MELLITUS WITH BOTH EYES AFFECTED BY MILD NONPROLIFERATIVE RETINOPATHY WITHOUT MACULAR EDEMA, WITH LONG-TERM CURRENT USE OF INSULIN (H): Primary | ICD-10-CM

## 2024-10-25 DIAGNOSIS — Z79.4 CONTROLLED TYPE 2 DIABETES MELLITUS WITH BOTH EYES AFFECTED BY MILD NONPROLIFERATIVE RETINOPATHY WITHOUT MACULAR EDEMA, WITH LONG-TERM CURRENT USE OF INSULIN (H): Primary | ICD-10-CM

## 2024-10-25 DIAGNOSIS — H04.129 DRY EYES DUE TO DECREASED TEAR PRODUCTION: ICD-10-CM

## 2024-10-25 DIAGNOSIS — H53.2 DIPLOPIA: ICD-10-CM

## 2024-10-25 PROCEDURE — 92015 DETERMINE REFRACTIVE STATE: CPT | Performed by: OPTOMETRIST

## 2024-10-25 PROCEDURE — 92014 COMPRE OPH EXAM EST PT 1/>: CPT | Performed by: OPTOMETRIST

## 2024-10-25 RX ORDER — ONDANSETRON 4 MG/1
4 TABLET, ORALLY DISINTEGRATING ORAL
COMMUNITY
Start: 2024-10-21

## 2024-10-25 RX ORDER — CARBOXYMETHYLCELLULOSE SODIUM 5 MG/ML
1 SOLUTION/ DROPS OPHTHALMIC 2 TIMES DAILY PRN
Qty: 30 EACH | Refills: 11 | Status: SHIPPED | OUTPATIENT
Start: 2024-10-25 | End: 2024-10-25

## 2024-10-25 RX ORDER — CEFADROXIL 500 MG/1
1000 CAPSULE ORAL
COMMUNITY
Start: 2024-10-21 | End: 2024-10-28

## 2024-10-25 RX ORDER — TRAZODONE HYDROCHLORIDE 50 MG/1
50 TABLET, FILM COATED ORAL
COMMUNITY
Start: 2024-06-17

## 2024-10-25 RX ORDER — CARBOXYMETHYLCELLULOSE SODIUM 5 MG/ML
1 SOLUTION/ DROPS OPHTHALMIC 2 TIMES DAILY PRN
Qty: 30 ML | Refills: 11 | Status: SHIPPED | OUTPATIENT
Start: 2024-10-25

## 2024-10-25 ASSESSMENT — VISUAL ACUITY
CORRECTION_TYPE: GLASSES
METHOD: SNELLEN - LINEAR
OD_CC: 20/25
OS_CC: 20/25

## 2024-10-25 ASSESSMENT — REFRACTION_MANIFEST
OD_AXIS: 012
OS_CYLINDER: +0.50
OS_AXIS: 126
OD_CYLINDER: +1.75
OS_ADD: +2.75
OD_SPHERE: PLANO
OD_ADD: +2.75
OS_SPHERE: +0.50

## 2024-10-25 ASSESSMENT — REFRACTION_WEARINGRX
OS_SPHERE: -0.25
OS_CYLINDER: +0.50
OD_ADD: +2.75
OS_AXIS: 127
OD_SPHERE: -1.00
OD_AXIS: 033
OD_CYLINDER: +2.00
SPECS_TYPE: PAL
OS_ADD: +2.75

## 2024-10-25 ASSESSMENT — CONF VISUAL FIELD
OS_SUPERIOR_NASAL_RESTRICTION: 0
METHOD: COUNTING FINGERS
OS_INFERIOR_TEMPORAL_RESTRICTION: 0
OS_INFERIOR_NASAL_RESTRICTION: 0
OS_SUPERIOR_TEMPORAL_RESTRICTION: 0
OD_NORMAL: 1
OD_INFERIOR_NASAL_RESTRICTION: 0
OD_SUPERIOR_NASAL_RESTRICTION: 0
OD_SUPERIOR_TEMPORAL_RESTRICTION: 0
OD_INFERIOR_TEMPORAL_RESTRICTION: 0
OS_NORMAL: 1

## 2024-10-25 ASSESSMENT — TONOMETRY
IOP_METHOD: TONOPEN
OS_IOP_MMHG: 13
OD_IOP_MMHG: 16

## 2024-10-25 ASSESSMENT — REFRACTION_FINALRX
OS_HPRISM: 2.5
OS_HBASE: IN
OD_HPRISM: 2.5
OD_HBASE: IN

## 2024-10-25 ASSESSMENT — SLIT LAMP EXAM - LIDS
COMMENTS: NORMAL
COMMENTS: NORMAL

## 2024-10-25 ASSESSMENT — CUP TO DISC RATIO
OD_RATIO: 0.2
OS_RATIO: 0.2

## 2024-10-25 ASSESSMENT — EXTERNAL EXAM - LEFT EYE: OS_EXAM: NORMAL

## 2024-10-25 ASSESSMENT — EXTERNAL EXAM - RIGHT EYE: OD_EXAM: NORMAL

## 2024-10-25 NOTE — PROGRESS NOTES
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 Encouraged continued blood glucose, pressure, and lipid control. Patient should continue following recommendations of primary care provider. Patient should plan on returning to clinic annually for a dilated eye exam but was encouraged to return to clinic sooner with new flashes/floaters or other vision changes.     (H04.129) Dry eyes due to decreased tear production  Plan: carboxymethylcellulose PF         (CARBOXYMETHYLCELLULOSE SODIUM) 0.5 % ophthalmic solution        Recommend regular use of lubricating drops. Return to clinic if symptoms do not improve.     (H53.2) Diplopia  Comment: Stable eso deviation noted.   Plan: Rx updated. Return to clinic if diplopia does not improve with new glasses.     (H52.4) Presbyopia  Plan: REFRACTION [[id number]]        Discussed findings with patient. New spectacle prescription dispensed to patient. Patient is welcome to return to clinic with prolonged adaptation difficulties.         Complete documentation of historical and exam elements from today's encounter can  be found in the full encounter summary report (not reduplicated in this progress  note). I personally obtained the chief complaint(s) and history of present illness. I  confirmed and edited as necessary the review of systems, past medical/surgical  history, family history, social history, and examination findings as documented by  others; and I examined the patient myself. I personally reviewed the relevant tests,  images, and reports as documented above. I formulated and edited as necessary the  assessment and plan and discussed the findings and management plan with the  patient and family.    Joseph Martinez, OD

## 2024-10-25 NOTE — NURSING NOTE
"Chief Complaints and History of Present Illnesses   Patient presents with    Diabetic Eye Exam       Chief Complaint(s) and History of Present Illness(es)       Diabetic Eye Exam               Comments    Diabetic eye health exam. Patient has been feeling like there is a \"film\" that comes and goes over of her right eye. This has been going on for about a month. She mentions a history of flashing lights, no changes. She denies floaters. Patient has no ocular pain or discomfort nor does she see the \"film\" today. Patient also has double vision that comes and goes. She has it with both glasses on and off. When she closes her right eye it will sometimes go away.   Ocular Medications: None  Lab Results       Component                Value               Date                       A1C                      7.7                 09/19/2024                 A1C                      7.9                 05/23/2024                 A1C                      7.4                 12/01/2023                 A1C                      7.7                 09/15/2023                 A1C                      7.9                 07/14/2023                 A1C                      8.7                 04/26/2023                 A1C                      9.1                 01/19/2023                 A1C                      8.9                 11/12/2021                 A1C                      7.7                 01/06/2021                 A1C                      7.9                 12/11/2020                 A1C                      10.6                08/24/2020                 A1C                      9.5                 06/18/2020                              Mahnaz Morales, COA      "

## 2024-10-30 ENCOUNTER — TELEPHONE (OUTPATIENT)
Dept: ORTHOPEDICS | Facility: CLINIC | Age: 69
End: 2024-10-30
Payer: COMMERCIAL

## 2024-10-30 DIAGNOSIS — Z96.652 STATUS POST TOTAL LEFT KNEE REPLACEMENT: Primary | ICD-10-CM

## 2024-10-30 NOTE — CONFIDENTIAL NOTE
Other: Patient called and is wondering what medication she can take before surgery. Please call patient back with info.     Could we send this information to you in [employer] or would you prefer to receive a phone call?:   Patient would prefer a phone call   Okay to leave a detailed message?: Yes at Cell number on file:    Telephone Information:   Mobile [phone]

## 2024-10-30 NOTE — TELEPHONE ENCOUNTER
Pt is taking Minjaro and has questions about how long before surgery to stop taking this along with her other medications.

## 2024-10-31 NOTE — TELEPHONE ENCOUNTER
Called and talked with patient regarding medications for upcoming surgery in December.   Went through the medications and reviewed him to our policy on when to stop before surgery.  Also discussed with patient that at her preop physical they will review medications and when to stop as well.  Discussed with patient that she needs to get scheduled for preop physical within 30 days of surgery and asked her to make that appointment starting November 20 or after.  She will call her primary care office to get set up for that.  Her daughter will be staying with her after surgery and driving her home.  PT orders were placed.  Patient has no other questions at this time and will call back if she has any.  Yaa Eden RN

## 2024-11-01 DIAGNOSIS — E11.65 TYPE 2 DIABETES MELLITUS WITH HYPERGLYCEMIA, WITH LONG-TERM CURRENT USE OF INSULIN (H): ICD-10-CM

## 2024-11-01 DIAGNOSIS — Z79.4 TYPE 2 DIABETES MELLITUS WITH HYPERGLYCEMIA, WITH LONG-TERM CURRENT USE OF INSULIN (H): ICD-10-CM

## 2024-11-01 DIAGNOSIS — J30.2 SEASONAL ALLERGIC RHINITIS, UNSPECIFIED TRIGGER: ICD-10-CM

## 2024-11-01 RX ORDER — ASPIRIN 81 MG
81 TABLET,CHEWABLE ORAL DAILY
Qty: 90 TABLET | Refills: 0 | Status: SHIPPED | OUTPATIENT
Start: 2024-11-01

## 2024-11-02 RX ORDER — CETIRIZINE HYDROCHLORIDE 10 MG/1
10 TABLET ORAL DAILY
Qty: 90 TABLET | Refills: 1 | Status: SHIPPED | OUTPATIENT
Start: 2024-11-02

## 2024-11-03 DIAGNOSIS — G25.81 RESTLESS LEG SYNDROME: ICD-10-CM

## 2024-11-04 DIAGNOSIS — G25.81 RESTLESS LEG SYNDROME: ICD-10-CM

## 2024-11-04 DIAGNOSIS — S92.352A CLOSED FRACTURE OF FIFTH METATARSAL BONE OF LEFT FOOT, PHYSEAL INVOLVEMENT UNSPECIFIED, INITIAL ENCOUNTER: ICD-10-CM

## 2024-11-04 DIAGNOSIS — M25.561 ARTHRALGIA OF BOTH KNEES: ICD-10-CM

## 2024-11-04 DIAGNOSIS — Z79.4 TYPE 2 DIABETES MELLITUS WITHOUT COMPLICATION, WITH LONG-TERM CURRENT USE OF INSULIN (H): ICD-10-CM

## 2024-11-04 DIAGNOSIS — E11.9 TYPE 2 DIABETES MELLITUS WITHOUT COMPLICATION, WITH LONG-TERM CURRENT USE OF INSULIN (H): ICD-10-CM

## 2024-11-04 DIAGNOSIS — M25.562 ARTHRALGIA OF BOTH KNEES: ICD-10-CM

## 2024-11-04 RX ORDER — ROPINIROLE 0.25 MG/1
0.25 TABLET, FILM COATED ORAL EVERY EVENING
Qty: 90 TABLET | Refills: 0 | Status: SHIPPED | OUTPATIENT
Start: 2024-11-04

## 2024-11-04 NOTE — PROGRESS NOTES
Medication Therapy Management (MTM) Encounter    ASSESSMENT:                            Medication Adherence/Access: See below for considerations    Diabetes:   Patient is not meeting A1c goal of < 7%.May benefit from restarting 10mg dose  of Mounjaro after pharmacy filled the 7.5mg dose. Patient should stop Mounjaro a week before surgery.     PLAN:                          Recommend Mounjaro 10mg weekly. New prescription sent  Call clinic with any low blood sugar readings  Can take Mounjaro on 12/9 but should not take Mounjaro on 12/16 prior to surgery.   Gabapentin refilled     Follow-up: 1 month    SUBJECTIVE/OBJECTIVE:                          Holly Muir is a 69 year old female seen for a follow-up visit.  Today's visit is a follow-up MTM visit from 10/15/2024.     Reason for visit:  blood sugars  Needs a refill of Mounjaro, gabapentin    Allergies/ADRs: Reviewed in chart  Past Medical History: Reviewed in chart  Tobacco: She reports that she quit smoking about 47 years ago. Her smoking use included cigarettes. She started smoking about 48 years ago. She has never used smokeless tobacco.  Alcohol: not currently using  Lives between two daughters.     Medication Adherence/Access: daughters help manage medications. Uses a pill box.   Per patient, daughters hand her her medications to her.  Has not missed any insulin doses in the last week  Has timer on her phone to remind her when to take her insulin and this has been helpful.  The patient fills medications at [place]: NO, fills medications at [facility]    Diabetes   Type 2 Diabetes:    Lantus 20 units daily  Mounjaro 10mg weekly-Monday but pharmacy filled 7.5mg dose so she has been taking this dose for the last month. When taking the 10mg didn't have any side effects.   Actos 45mg daily  Glimepiride 1mg once daily  Metformin XR 1000mg twice daily  Not taking aspirin due to Apixiban    Patient is not experiencing side effects.  Blood sugar  monitoring: CGM-  7 day average 1  12am-147  3am-152  6am-170  9am-213  12pm-166   3pm-118   6pm-133   9pm-128      Time in [employer] % >240mg-7% 181-240 25% -64%    Current diabetes symptoms: Patient no longer having lows overnight.   Appetite is less, when out to eat only eats half of a burger  Has been trying to walk more and goes  to the senior center, walking, chair leg exercises.  Drinks water, rarely has a soda  Follows with endocrinology  Patient reports having lost weight 193 to 187 on the 10mg dose.      Eye exam in the last 12 months? No  Foot exam: Yes  Is having surgery 12/18.      ----------------  I spent 16 minutes with this patient today. All changes were made via collaborative practice agreement with Dr. King. A copy of the visit note was provided to the patient's provider(s).    A summary of these recommendations was sent via [employer].    Telemedicine Visit Details  The patient's medications can be safely assessed via a telemedicine encounter.  Type of service:  Telephone visit  Originating Location (pt. Location): Home    Distant Location (provider location):  On-site  Start Time: 12:36PM  End Time: 12:52PM     Medication Therapy Recommendations  Type 2 diabetes mellitus without complication, with long-term current use of insulin (H)   1 Current Medication: Tirzepatide 10 MG/0.5ML SOAJ (Discontinued)   Current Medication Sig: Inject 0.5 mLs (10 mg) subcutaneously every 7 days.   Rationale: Dose too low - Dosage too low - Effectiveness   Recommendation: Increase Dose   Status: Accepted per CPA   Identified Date: 11/5/2024 Completed Date: 11/5/2024

## 2024-11-05 ENCOUNTER — VIRTUAL VISIT (OUTPATIENT)
Dept: PHARMACY | Facility: CLINIC | Age: 69
End: 2024-11-05
Payer: COMMERCIAL

## 2024-11-05 DIAGNOSIS — E11.9 TYPE 2 DIABETES MELLITUS WITHOUT COMPLICATION, WITH LONG-TERM CURRENT USE OF INSULIN (H): ICD-10-CM

## 2024-11-05 DIAGNOSIS — Z79.4 TYPE 2 DIABETES MELLITUS WITHOUT COMPLICATION, WITH LONG-TERM CURRENT USE OF INSULIN (H): ICD-10-CM

## 2024-11-05 PROCEDURE — 99606 MTMS BY PHARM EST 15 MIN: CPT | Mod: 93 | Performed by: PHARMACIST

## 2024-11-05 PROCEDURE — 99607 MTMS BY PHARM ADDL 15 MIN: CPT | Mod: 93 | Performed by: PHARMACIST

## 2024-11-05 RX ORDER — TIRZEPATIDE 7.5 MG/.5ML
INJECTION, SOLUTION SUBCUTANEOUS
OUTPATIENT
Start: 2024-11-05

## 2024-11-05 RX ORDER — GABAPENTIN 600 MG/1
TABLET ORAL
Qty: 180 TABLET | Refills: 5 | Status: SHIPPED | OUTPATIENT
Start: 2024-11-05

## 2024-11-05 NOTE — PATIENT INSTRUCTIONS
"Recommendations from today's MTM visit:                                                         Recommend Mounjaro 10mg weekly. New prescription sent  Call clinic with any low blood sugar readings  Can take Mounjaro on 12/9 but should not take Mounjaro on 12/16 prior to surgery.   Gabapentin refilled     Follow-up: 4 weeks    It was great speaking with you today.  I value your experience and would be very thankful for your time in providing feedback in our clinic survey. In the next few days, you may receive an email or text message from [employer] with a link to a survey related to your  clinical pharmacist.\"     To schedule another MTM appointment, please call the clinic directly or you may call the MTM scheduling line at [phone] or toll-free at [phone].     My Clinical Pharmacist's contact information:                                                      Please feel free to contact me with any questions or concerns you have.      Liz Calero, Pharm.D, BCACP  Medication Therapy Management Pharmacist      "

## 2024-11-05 NOTE — TELEPHONE ENCOUNTER
Refilling per MT CPA.  Liz Calero, Pharm.D, [facility]  Medication Therapy Management Pharmacist

## 2024-11-05 NOTE — TELEPHONE ENCOUNTER
Erroneous request. Patient is taking 10mg of Mounjaro.  Liz Calero, Pharm.D, [facility]  Medication Therapy Management Pharmacist

## 2024-11-06 ENCOUNTER — LAB (OUTPATIENT)
Dept: LAB | Facility: CLINIC | Age: 69
End: 2024-11-06
Payer: COMMERCIAL

## 2024-11-06 DIAGNOSIS — I10 HTN, GOAL BELOW 140/90: ICD-10-CM

## 2024-11-06 DIAGNOSIS — E78.5 HYPERLIPIDEMIA LDL GOAL <100: ICD-10-CM

## 2024-11-06 LAB
ANION GAP SERPL CALCULATED.3IONS-SCNC: 10 MMOL/L (ref 7–15)
BUN SERPL-MCNC: 15 MG/DL (ref 8–23)
CALCIUM SERPL-MCNC: 9.6 MG/DL (ref 8.8–10.4)
CHLORIDE SERPL-SCNC: 97 MMOL/L (ref 98–107)
CHOLEST SERPL-MCNC: 98 MG/DL
CREAT SERPL-MCNC: 1 MG/DL (ref 0.51–0.95)
EGFRCR SERPLBLD CKD-EPI 2021: 61 ML/MIN/1.73M2
FASTING STATUS PATIENT QL REPORTED: YES
FASTING STATUS PATIENT QL REPORTED: YES
GLUCOSE SERPL-MCNC: 254 MG/DL (ref 70–99)
HCO3 SERPL-SCNC: 25 MMOL/L (ref 22–29)
HDLC SERPL-MCNC: 50 MG/DL
HGB BLD-MCNC: 11.4 G/DL (ref 11.7–15.7)
LDLC SERPL CALC-MCNC: 23 MG/DL
NONHDLC SERPL-MCNC: 48 MG/DL
POTASSIUM SERPL-SCNC: 4.6 MMOL/L (ref 3.4–5.3)
SODIUM SERPL-SCNC: 132 MMOL/L (ref 135–145)
TRIGL SERPL-MCNC: 127 MG/DL

## 2024-11-06 PROCEDURE — 36415 COLL VENOUS BLD VENIPUNCTURE: CPT

## 2024-11-06 PROCEDURE — 85018 HEMOGLOBIN: CPT

## 2024-11-06 PROCEDURE — 80048 BASIC METABOLIC PNL TOTAL CA: CPT

## 2024-11-06 PROCEDURE — 80061 LIPID PANEL: CPT

## 2024-11-11 DIAGNOSIS — M17.12 PRIMARY OSTEOARTHRITIS OF LEFT KNEE: ICD-10-CM

## 2024-11-11 RX ORDER — OXYCODONE AND ACETAMINOPHEN 5; 325 MG/1; MG/1
1 TABLET ORAL EVERY 6 HOURS PRN
Qty: 30 TABLET | Refills: 0 | Status: SHIPPED | OUTPATIENT
Start: 2024-11-11

## 2024-11-11 NOTE — TELEPHONE ENCOUNTER
Medication Refill Request    Has the patient contacted the pharmacy for the refill? Yes   Name of medication being requested: Oxycodone prescription  Provider who prescribed the medication: Dr. Santy Harrington  Pharmacy: [facility]  Date medication is needed: Patient has 1 pill left, as soon as possible       Could we send this information to you in [employer] or would you prefer to receive a phone call?:   Patient would prefer a phone call   Okay to leave a detailed message?: Yes at Home number on file [phone] (home)

## 2024-11-11 NOTE — TELEPHONE ENCOUNTER
Prescription refill requested for:   oxyCODONE-acetaminophen (PERCOCET) 5-325 MG tablet       Last Written Prescription Date:  10/15/24  Last Fill Quantity: 30,   # refills: 0  Last Office Visit: 10/15/24  Future Office visit:    Next 5 appointments (look out 90 days)      Nov 26, 2024 12:00 PM  Pharmacist Visit with Liz Calero RPH  [facility] ([facility] ) [address] [place] [id number]  [phone]     Dec 02, 2024 10:30 AM  (Arrive by 10:10 AM)  Pre-Operative Physical with KASIE Silver CNP  [facility] ([facility] ) [address] [place] [id number] [phone]                 Martinez Patel, ATC

## 2024-11-25 NOTE — PROGRESS NOTES
Medication Therapy Management (MTM) Encounter    ASSESSMENT:                            Medication Adherence/Access: See below for considerations    Diabetes:   Patient is not meeting A1c goal of < 7%.May benefit from discontinuing glimepiride due to low blood sugars overnight. Would benefit from restarting Lantus.   PLAN:                            Call clinic with any low blood sugar readings  Stop Glimepiride  Restart Lantus 20 units daily    Follow-up: 1 month    SUBJECTIVE/OBJECTIVE:                          Holly Muir is a 69 year old female seen for a follow-up visit.  Today's visit is a follow-up MTM visit from 11/5/2024.     Reason for visit:  blood sugars    Allergies/ADRs: Reviewed in chart  Past Medical History: Reviewed in chart  Tobacco: She reports that she quit smoking about 47 years ago. Her smoking use included cigarettes. She started smoking about 48 years ago. She has never used smokeless tobacco.  Alcohol: not currently using  Lives between two daughters.     Medication Adherence/Access: daughters help manage medications. Uses a pill box.   Per patient, daughters hand her her medications to her.  Has timer on her phone to remind her when to take her insulin and this has been helpful.  The patient fills medications at [place]: NO, fills medications at [facility]    Diabetes   Type 2 Diabetes:    Lantus 20 units daily  Mounjaro 10mg weekly-just started the 10mg last Monday  Actos 45mg daily  Glimepiride 1mg once daily  Metformin XR 1000mg twice daily  Not taking aspirin due to Apixiban    Patient is not experiencing side effects.  Blood sugar monitoring: CGM-  7 day average   12am-161  3am-173  6am-184  9am-211  12pm-145  3pm-133   6pm-147  9pm- 149     Time in [employer] % >240mg-9% 181-240 22% -69%    Current diabetes symptoms: Has been having a snack before going to bed because otherwise she will go low for the last couple of days since increasing dose of  "Mounjaro.  Appetite is less, when out to eat only eats half of a burger  Doesn't eat until 10 or 11am. Will have coffee with nonsugar creamer in coffee when she wakes up.   Has not taken Lantus for 3-4 days because her blood sugars \"will go down over night\"  Has been trying to walk more and goes  to the senior center, walking, chair leg exercises.  Drinks water, rarely has a soda  Follows with endocrinology  Starting weight 193lb. Current weight 186lb     Eye exam in the last 12 months? No  Foot exam: Yes  Is having surgery 12/18.      ----------------  I spent 14 minutes with this patient today. All changes were made via collaborative practice agreement with Dr. King. A copy of the visit note was provided to the patient's provider(s).    A summary of these recommendations was sent via [employer].    Telemedicine Visit Details  The patient's medications can be safely assessed via a telemedicine encounter.  Type of service:  Telephone visit  Originating Location (pt. Location): Home    Distant Location (provider location):  On-site  Start Time: 12:03PM  End Time: 12:17PM     Medication Therapy Recommendations  Type 2 diabetes mellitus with hyperglycemia, with long-term current use of insulin (H)   1 Current Medication: glimepiride (AMARYL) 1 MG tablet (Discontinued)   Current Medication Sig: Take 1 tablet (1 mg) by mouth every morning (before breakfast).   Rationale: Duplicate Therapy - Unnecessary medication therapy - Indication   Recommendation: Discontinue Medication   Status: Accepted per CPA   Identified Date: 11/26/2024 Completed Date: 11/26/2024         2 Current Medication: insulin glargine (LANTUS SOLOSTAR) 100 UNIT/ML pen   Current Medication Sig: INJECT 20 UNITS AT BEDTIME AS DIRECTED + 2 UNITS FOR PRIMING OF PEN.   Rationale: Dose too low - Dosage too low - Effectiveness   Recommendation: Start Medication   Status: Patient Agreed - Adherence/Education   Identified Date: 11/26/2024 Completed Date: 11/26/2024    "

## 2024-11-26 ENCOUNTER — VIRTUAL VISIT (OUTPATIENT)
Dept: PHARMACY | Facility: CLINIC | Age: 69
End: 2024-11-26
Payer: COMMERCIAL

## 2024-11-26 PROCEDURE — 99606 MTMS BY PHARM EST 15 MIN: CPT | Mod: 93 | Performed by: PHARMACIST

## 2024-11-26 PROCEDURE — 99607 MTMS BY PHARM ADDL 15 MIN: CPT | Mod: 93 | Performed by: PHARMACIST

## 2024-11-26 NOTE — PATIENT INSTRUCTIONS
"Recommendations from today's MTM visit:                                                         Call clinic with any low blood sugar readings  Stop Glimepiride  Restart Lantus 20 units daily    Follow-up: 12/11/2024     It was great speaking with you today.  I value your experience and would be very thankful for your time in providing feedback in our clinic survey. In the next few days, you may receive an email or text message from [facility] [employer] with a link to a survey related to your  clinical pharmacist.\"     To schedule another MTM appointment, please call the clinic directly or you may call the MTM scheduling line at [phone] or toll-free at [phone].     My Clinical Pharmacist's contact information:                                                      Please feel free to contact me with any questions or concerns you have.      Liz Calero, Pharm.D, [facility]  Medication Therapy Management Pharmacist      "

## 2024-11-27 DIAGNOSIS — E78.00 HIGH CHOLESTEROL: ICD-10-CM

## 2024-11-27 RX ORDER — SIMVASTATIN 20 MG
TABLET ORAL
Qty: 90 TABLET | Refills: 0 | Status: SHIPPED | OUTPATIENT
Start: 2024-11-27

## 2024-12-02 ENCOUNTER — OFFICE VISIT (OUTPATIENT)
Dept: FAMILY MEDICINE | Facility: CLINIC | Age: 69
End: 2024-12-02
Payer: COMMERCIAL

## 2024-12-02 VITALS
SYSTOLIC BLOOD PRESSURE: 96 MMHG | BODY MASS INDEX: 32.85 KG/M2 | HEIGHT: 63 IN | RESPIRATION RATE: 18 BRPM | OXYGEN SATURATION: 100 % | TEMPERATURE: 97.7 F | DIASTOLIC BLOOD PRESSURE: 64 MMHG | HEART RATE: 72 BPM | WEIGHT: 185.4 LBS

## 2024-12-02 DIAGNOSIS — Z23 HIGH PRIORITY FOR 2019-NCOV VACCINE: ICD-10-CM

## 2024-12-02 DIAGNOSIS — Z79.4 TYPE 2 DIABETES MELLITUS WITH HYPERGLYCEMIA, WITH LONG-TERM CURRENT USE OF INSULIN (H): ICD-10-CM

## 2024-12-02 DIAGNOSIS — Z86.711 HISTORY OF PULMONARY EMBOLISM: ICD-10-CM

## 2024-12-02 DIAGNOSIS — I10 HTN, GOAL BELOW 140/90: ICD-10-CM

## 2024-12-02 DIAGNOSIS — F32.3 SEVERE MAJOR DEPRESSION WITH PSYCHOTIC FEATURES (H): ICD-10-CM

## 2024-12-02 DIAGNOSIS — F41.9 ANXIETY: ICD-10-CM

## 2024-12-02 DIAGNOSIS — N18.31 CHRONIC KIDNEY DISEASE, STAGE 3A (H): ICD-10-CM

## 2024-12-02 DIAGNOSIS — E11.65 TYPE 2 DIABETES MELLITUS WITH HYPERGLYCEMIA, WITH LONG-TERM CURRENT USE OF INSULIN (H): ICD-10-CM

## 2024-12-02 DIAGNOSIS — N28.9 FUNCTION KIDNEY DECREASED: ICD-10-CM

## 2024-12-02 DIAGNOSIS — E78.5 HYPERLIPIDEMIA LDL GOAL <100: ICD-10-CM

## 2024-12-02 DIAGNOSIS — Z01.818 PREOP GENERAL PHYSICAL EXAM: Primary | ICD-10-CM

## 2024-12-02 DIAGNOSIS — M17.12 PRIMARY OSTEOARTHRITIS OF LEFT KNEE: ICD-10-CM

## 2024-12-02 DIAGNOSIS — F25.9 SCHIZOAFFECTIVE DISORDER, UNSPECIFIED TYPE (H): ICD-10-CM

## 2024-12-02 LAB
ANION GAP SERPL CALCULATED.3IONS-SCNC: 13 MMOL/L (ref 7–15)
BUN SERPL-MCNC: 24.6 MG/DL (ref 8–23)
CALCIUM SERPL-MCNC: 9.9 MG/DL (ref 8.8–10.4)
CHLORIDE SERPL-SCNC: 92 MMOL/L (ref 98–107)
CREAT SERPL-MCNC: 1.82 MG/DL (ref 0.51–0.95)
EGFRCR SERPLBLD CKD-EPI 2021: 30 ML/MIN/1.73M2
ERYTHROCYTE [DISTWIDTH] IN BLOOD BY AUTOMATED COUNT: 13.7 % (ref 10–15)
EST. AVERAGE GLUCOSE BLD GHB EST-MCNC: 174 MG/DL
GLUCOSE SERPL-MCNC: 200 MG/DL (ref 70–99)
HBA1C MFR BLD: 7.7 % (ref 0–5.6)
HCO3 SERPL-SCNC: 23 MMOL/L (ref 22–29)
HCT VFR BLD AUTO: 33.8 % (ref 35–47)
HGB BLD-MCNC: 11 G/DL (ref 11.7–15.7)
MCH RBC QN AUTO: 28.2 PG (ref 26.5–33)
MCHC RBC AUTO-ENTMCNC: 32.5 G/DL (ref 31.5–36.5)
MCV RBC AUTO: 87 FL (ref 78–100)
PLATELET # BLD AUTO: 328 10E3/UL (ref 150–450)
POTASSIUM SERPL-SCNC: 4.9 MMOL/L (ref 3.4–5.3)
RBC # BLD AUTO: 3.9 10E6/UL (ref 3.8–5.2)
SODIUM SERPL-SCNC: 128 MMOL/L (ref 135–145)
WBC # BLD AUTO: 7.2 10E3/UL (ref 4–11)

## 2024-12-02 PROCEDURE — 85027 COMPLETE CBC AUTOMATED: CPT | Performed by: NURSE PRACTITIONER

## 2024-12-02 PROCEDURE — 90480 ADMN SARSCOV2 VAC 1/ONLY CMP: CPT | Performed by: NURSE PRACTITIONER

## 2024-12-02 PROCEDURE — 83036 HEMOGLOBIN GLYCOSYLATED A1C: CPT | Performed by: NURSE PRACTITIONER

## 2024-12-02 PROCEDURE — 80048 BASIC METABOLIC PNL TOTAL CA: CPT | Performed by: NURSE PRACTITIONER

## 2024-12-02 PROCEDURE — 99214 OFFICE O/P EST MOD 30 MIN: CPT | Mod: 25 | Performed by: NURSE PRACTITIONER

## 2024-12-02 PROCEDURE — 91320 SARSCV2 VAC 30MCG TRS-SUC IM: CPT | Performed by: NURSE PRACTITIONER

## 2024-12-02 PROCEDURE — 93000 ELECTROCARDIOGRAM COMPLETE: CPT | Performed by: NURSE PRACTITIONER

## 2024-12-02 PROCEDURE — 36415 COLL VENOUS BLD VENIPUNCTURE: CPT | Performed by: NURSE PRACTITIONER

## 2024-12-02 ASSESSMENT — PATIENT HEALTH QUESTIONNAIRE - PHQ9
SUM OF ALL RESPONSES TO PHQ QUESTIONS 1-9: 5
10. IF YOU CHECKED OFF ANY PROBLEMS, HOW DIFFICULT HAVE THESE PROBLEMS MADE IT FOR YOU TO DO YOUR WORK, TAKE CARE OF THINGS AT HOME, OR GET ALONG WITH OTHER PEOPLE: EXTREMELY DIFFICULT
SUM OF ALL RESPONSES TO PHQ QUESTIONS 1-9: 5

## 2024-12-02 ASSESSMENT — PAIN SCALES - GENERAL: PAINLEVEL_OUTOF10: EXTREME PAIN (8)

## 2024-12-02 NOTE — PROGRESS NOTES
Preoperative Evaluation  [address] [address]  Phone: [phone]  Primary Provider: Tiffany King MD PhD  Pre-op Performing Provider: KASIE Silver CNP  Dec 2, 2024   {Provider  Link to PREOP SmartSet  REQUIRED to apply standard patient instructions and medication directions to the AVS :512950}          12/2/2024   Surgical Information   What procedure is being done? lef knee replaement    Facility or Hospital where procedure/surgery will be performed: [facility]    Who is doing the procedure / surgery? trudy    Date of surgery / procedure: december 18    Time of surgery / procedure: left kneww surgert  1:45pm    Where do you plan to recover after surgery? at home with family        Patient-reported     Fax number for surgical facility: Note does not need to be faxed, will be available electronically in Epic.    Assessment & Plan     The proposed surgical procedure is considered INTERMEDIATE risk.    Preop general physical exam  ***  - Basic metabolic panel  - CBC with platelets  - Hemoglobin A1c  - EKG 12-lead complete w/read - Clinics  - Basic metabolic panel  - CBC with platelets  - Hemoglobin A1c    Primary osteoarthritis of left knee  ***  - Basic metabolic panel  - CBC with platelets  - Hemoglobin A1c  - EKG 12-lead complete w/read - Clinics  - Basic metabolic panel  - CBC with platelets  - Hemoglobin A1c    Type 2 diabetes mellitus with hyperglycemia, with long-term current use of insulin (H)  ***  - Hemoglobin A1c  - EKG 12-lead complete w/read - Clinics  - Hemoglobin A1c    Severe major depression with psychotic features (H)  ***    HTN, goal below 140/90  ***  - Basic metabolic panel  - EKG 12-lead complete w/read - Clinics  - Basic metabolic panel    Hyperlipidemia LDL goal <100  ***    Anxiety  ***    High priority for 2019-nCoV vaccine  ***             Risks and Recommendations  The patient has the following  additional risks and recommendations for perioperative complications:  {High Risk Conditions :680843}    Preoperative Medication Instructions  Antiplatelet or Anticoagulation Medication Instructions   - aspirin: Discontinue aspirin 7-10 days prior to procedure to reduce bleeding risk. It should be resumed postoperatively.     Additional Medication Instructions  Take all scheduled medications on the day of surgery EXCEPT for modifications listed below:   - ACE/ARB: DO NOT TAKE on day of surgery (minimum 11 hours for general anesthesia).   - Beta Blockers: Continue taking on the day of surgery.   - Statins: Continue taking on the day of surgery.    - Long acting insulin (e.g. glargine, detemir): Take 80% of the usual evening or morning dose before surgery. {Reference  See System Preoperative Guideline for alternative preop dosing options :986223}    - metformin: DO NOT TAKE day of surgery.   - Thiazolidinedione (e.g. rosiglitazone, pioglitazone): DO NOT TAKE day of surgery.   - GLP-1 Injectable (exenitide, liraglutide, semaglutide, dulaglutide, etc.): DO NOT TAKE 7 days before surgery    - pregabalin, gabapentin: Continue without modification.   - SSRIs, SNRIs, TCAs, Antipsychotics: Continue without modification.     Recommendation  {IMPORTANT - Approval:[id number]:}    Rebeka Barrera is a 69 year old, presenting for the following:  Pre-Op Exam          12/2/2024     9:45 AM   Additional Questions   Roomed by DENISHA   Accompanied by SELF     HPI related to upcoming procedure: ***        12/2/2024   Pre-Op Questionnaire   Have you ever had a heart attack or stroke? No    Have you ever had surgery on your heart or blood vessels, such as a stent placement, a coronary artery bypass, or surgery on an artery in your head, neck, heart, or legs? No    Do you have chest pain with activity? No    Do you have a history of heart failure? No    Do you currently have a cold, bronchitis or symptoms of other infection? No    Do you  have a cough, shortness of breath, or wheezing? No    Do you or anyone in your family have previous history of blood clots? (!) YES     Do you or does anyone in your family have a serious bleeding problem such as prolonged bleeding following surgeries or cuts? No    Have you ever had problems with anemia or been told to take iron pills? (!) YES anemia and tookAnswers submitted by the patient for this visit:  Patient Health Questionnaire (Submitted on 12/2/2024)  If you checked off any problems, how difficult have these problems made it for you to do your work, take care of things at home, or get along with other people?: Extremely difficult  PHQ9 TOTAL SCORE: 5   iron pills     Have you had any abnormal blood loss such as black, tarry or bloody stools, or abnormal vaginal bleeding? No    Have you ever had a blood transfusion? (!) YES    Have you ever had a transfusion reaction? No    Are you willing to have a blood transfusion if it is medically needed before, during, or after your surgery? Yes    Have you or any of your relatives ever had problems with anesthesia? No    Do you have sleep apnea, excessive snoring or daytime drowsiness? No    Do you have any artifical heart valves or other implanted medical devices like a pacemaker, defibrillator, or continuous glucose monitor? Yes   Has a bladder stimulator and will turn it off prior to surgery. Located on right buttocks    Do you have artificial joints? (!) YES    Are you allergic to latex? No        Patient-reported     Health Care Directive  Patient has a Health Care Directive on file      Preoperative Review of    reviewed - controlled substances reflected in medication list.  {Review MNPMP for all patients per [facility] MNPMP Profile:860707}    Status of Chronic Conditions:  See problem list for active medical problems.  Problems all longstanding and stable, except as noted/documented.  See ROS for pertinent symptoms related to these conditions.    DEPRESSION  - Patient has a long history of Depression of moderate severity requiring medication for control with recent symptoms being stable..Current symptoms of depression include none.     DIABETES - Patient has a longstanding history of DiabetesType {DIABETES TYPES:949301} . Patient is being treated with {DIABETES TREATMENTS:1216} and denies significant side effects. Control has been {G/F/P:103070}. Complicating factors include but are not limited to: {FVC DIABETES COMPLICATING FACTORS:517934}.     HYPERLIPIDEMIA - Patient has a long history of significant Hyperlipidemia requiring medication for treatment with recent {G/F/P:848943} control. Patient reports no problems or side effects with the medication.     HYPERTENSION - Patient has longstanding history of HTN , currently denies any symptoms referable to elevated blood pressure. Specifically denies chest pain, palpitations, dyspnea, orthopnea, PND or peripheral edema. Blood pressure readings have been in normal range. Current medication regimen is as listed below. Patient denies any side effects of medication.     Patient Active Problem List    Diagnosis Date Noted    S/P total knee arthroplasty, right 11/03/2023     Priority: Medium    Osteoarthritis of right knee, unspecified osteoarthritis type 10/02/2023     Priority: Medium    Noncompliance with medication regimen 09/07/2023     Priority: Medium     overuse of oxycodone, not taking gabapentin as scheduled.      Iron deficiency anemia secondary to inadequate dietary iron intake 08/04/2023     Priority: Medium    Pneumonia 05/07/2023     Priority: Medium    Chronic right shoulder pain 02/17/2023     Priority: Medium    Chronic kidney disease, stage 3a (H) 11/17/2021     Priority: Medium    Restless leg syndrome 09/22/2021     Priority: Medium    Tremor 09/22/2021     Priority: Medium    Fecal urgency 05/20/2020     Priority: Medium     Added automatically from request for surgery [id number]      Pseudophakia of right eye  10/25/2019     Priority: Medium    Anal stenosis 06/27/2019     Priority: Medium    S/P partial colectomy 06/27/2019     Priority: Medium    GERD (gastroesophageal reflux disease) 01/23/2019     Priority: Medium    Anemia 01/22/2019     Priority: Medium    Chest pain, musculoskeletal 01/22/2019     Priority: Medium    Diabetic neuropathy (H) 01/22/2019     Priority: Medium    Hyponatremia 01/22/2019     Priority: Medium    Schizoaffective disorder (H) 01/22/2019     Priority: Medium    Obesity (BMI 35.0-39.9) with comorbidity (H) 11/14/2018     Priority: Medium    Visual hallucination 04/21/2016     Priority: Medium    Type 2 diabetes mellitus with hyperglycemia, with long-term current use of insulin (H) 12/23/2015     Priority: Medium    Fecal incontinence due to anorectal disorder 09/28/2015     Priority: Medium    Anxiety 09/02/2015     Priority: Medium    Bilateral low back pain without sciatica 06/17/2015     Priority: Medium    Urinary retention 01/22/2015     Priority: Medium    Colostomy, evaluate (H) 10/17/2014     Priority: Medium    Encounter for long-term (current) use of insulin (H) 10/12/2014     Priority: Medium    Hypoglycemia due to insulin 09/17/2014     Priority: Medium    Rotator cuff impingement syndrome 08/07/2014     Priority: Medium    Cataracts, both eyes 05/08/2013     Priority: Medium    Dermatochalasis 05/10/2012     Priority: Medium    Presbyopia 05/10/2012     Priority: Medium    OCD (obsessive compulsive disorder) 05/10/2012     Priority: Medium    HTN, goal below 140/90 05/10/2012     Priority: Medium    Pain in joint, hand 01/25/2012     Priority: Medium    Trigger finger, acquired 01/25/2012     Priority: Medium     Problem list name updated by automated process. Provider to review      Synovitis and tenosynovitis 01/25/2012     Priority: Medium     Problem list name updated by automated process. Provider to review      Mixed incontinence urge and stress 06/01/2010     Priority:  Medium    Memory loss 06/01/2010     Priority: Medium     Diabetes coma around 2003      S/P colostomy (H) 04/03/2010     Priority: Medium     Due to injury to colon while she had a hysterectomy (age late 30s):  Seen Dr. Doherty in colorectal surgery at [facility]-does not want to operated. Needs her social support and memory loss issues be resolved first.       History of cervical cancer 09/23/2009     Priority: Medium     status post NATALIE/BSO. Being followed by Dr. Angelita Addison at [facility] gyn/onc      Severe major depression with psychotic features (H) 07/31/2009     Priority: Medium     Psychiatrist: Dr. Perkins at [facility].   Neuropsychological evalation at [facility] on 12/1/09: lower end cognitive functioning and multifactorial in etiologies including effects of psychosis, medications nd perhaps non-restorative sleep. Also has features of OCD. Recommend psychiatry referral for further evaluation of past Dx of schizophrenia and also psychotherapy.   Sees a therapist monthly now (has met goals) but will monitor for relapse       Hyperlipidemia LDL goal <100 01/27/2009     Priority: Medium    Primary generalized (osteo)arthritis 01/29/2014     Priority: Low      Past Medical History:   Diagnosis Date    Bilateral knee pain     Cataracts, both eyes 05/08/2013    Cervical cancer (H)     Chronic kidney disease, stage 3a (H) 11/17/2021    Depressive disorder 2005?    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     Past Surgical History:   Procedure Laterality Date    ARTHROPLASTY KNEE Right 10/02/2023    Procedure: ARTHROPLASTY, RIGHT KNEE, TOTAL;  Surgeon: Sanford Sheehan MD;  Location:  OR     ARTHROSCOPY KNEE RT/LT      LT    BIOPSY  1975    Lumb onleft side of breast    BLEPHAROPLASTY BILATERAL Bilateral 08/16/2019    Procedure: BILATERAL UPPER LID BLEPHAROPLASTY;  Surgeon: Randolph Cook MD;  Location: [facility] OR    BREAST LUMPECTOMY, RT/LT      LT-benign     CATARACT IOL, RT/LT      COLONOSCOPY  05/10/2012    Procedure:COLONOSCOPY; screening colonoscopy; Surgeon:MILLA VEGA; Location:[facility] OR    COLONOSCOPY WITH CO2 INSUFFLATION N/A 08/02/2019    Procedure: Colonoscopy with CO2 insufflation;  Surgeon: Himanshu Hi MD;  Location: [facility] OR    COLOSTOMY  2000    COLOSTOMY      HYSTERECTOMY, PAP NO LONGER INDICATED      done in [place]-cervical cancer    IMPLANT STIMULATOR SACRAL NERVE STAGE ONE Right 03/10/2015    Procedure: IMPLANT STIMULATOR SACRAL NERVE STAGE ONE;  Surgeon: Teodora Yusuf MD;  [facility]: [facility] OR    IMPLANT STIMULATOR SACRAL NERVE STAGE TWO Right 03/31/2015    Procedure: IMPLANT STIMULATOR SACRAL NERVE STAGE TWO;  Surgeon: Teodora Yusuf MD;  [facility] OR    IMPLANT STIMULATOR SACRAL NERVE STAGE TWO Right 06/22/2020    Procedure: INSERTION, SACRAL NERVE STIMULATOR, STAGE 2 (replacement of interstim battery);  Surgeon: Teodora Yusuf MD;  Location: [facility] OR    INJECT EPIDURAL TRANSFORAMINAL Right 01/20/2023    Procedure: Right Lumbar 5-Sacral 1 Transforaminal Epidural Steroid Injection with fluoroscopic guidance and contrast;  Surgeon: Kulwinder Booth MD;  Location: [facility] OR    INJECT EPIDURAL TRANSFORAMINAL Right 03/23/2023    Procedure: Right Lumbar 5-Sacral 1 Transforaminal Epidural Steroid Injection with fluoroscopic guidance and contrast.;  Surgeon: Kulwinder Booth MD;  Location: [facility] OR    PHACOEMULSIFICATION WITH STANDARD INTRAOCULAR LENS IMPLANT Left 09/26/2019    Procedure: LEFT PHACOEMULSIFICATION, CATARACT, WITH STANDARD IOL INSERTION;  Surgeon: Francesco Winn MD;  Location: [facility] OR    PHACOEMULSIFICATION WITH STANDARD INTRAOCULAR LENS IMPLANT  Right 10/24/2019    Procedure: RIGHT PHACOEMULSIFICATION, CATARACT, WITH STANDARD IOL INSERTION;  Surgeon: Francesco Winn MD;  Location: [facility] OR    REPAIR PTOSIS Bilateral 08/16/2019    SALPINGO OOPHORECTOMY,R/L/JENNIFER      Salpingo Oophorectomy, RT/LT/JENNIFER    ZZC EXCIS PTERYGIUM  10/2008    [facility]    [facility] STOMACH SURGERY PROCEDURE UNLISTED      [facility] COLONOSCOPY THRU STOMA, DIAGNOSTIC  2002    normal per report-no records available-records destroyed     Current Outpatient Medications   Medication Sig Dispense Refill    acetaminophen (TYLENOL) 325 MG tablet Take 2 tablets (650 mg) by mouth every 4 hours as needed for other (For optimal non-opioid multimodal pain management to improve pain control.) 100 tablet 0    aspirin (ASPIRIN LOW DOSE) 81 MG chewable tablet TAKE 1 TABLET BY MOUTH EVERY DAY 90 tablet 0    blood glucose monitoring (NO BRAND SPECIFIED) meter device kit Use to test blood sugar 4  times daily or as directed. Accuceck Guide meter 1 kit 1    blood glucose monitoring (ONE TOUCH ULTRA 2) meter device kit USE TO TEST BLOOD SUGAR 4 TIMES DAILY OR AS DIRECTED. ACCUCECK GUIDE METER      blood glucose monitoring (ULTRA THIN 30G) lancets Use to test blood sugar 4  times daily or as directed.lancets for acchu check guide 400 each 3    buPROPion (WELLBUTRIN SR) 200 MG 12 hr tablet Take 1 tablet (200 mg) by mouth daily (with breakfast). 30 tablet 5    busPIRone HCl (BUSPAR) 30 MG tablet TAKE 1 TABLET BY MOUTH TWICE A DAY 60 tablet 5    carboxymethylcellulose (REFRESH PLUS) 0.5 % SOLN ophthalmic solution PLACE 1 DROP INTO BOTH EYES 2 TIMES DAILY AS NEEDED FOR DRY EYES. 30 mL 11    cariprazine (VRAYLAR) 4.5 MG capsule Take 1 capsule (4.5 mg) by mouth daily. 30 capsule 5    cetirizine (ZYRTEC) 10 MG tablet TAKE 1 TABLET (10 MG) BY MOUTH DAILY. 90 tablet 1    Continuous Glucose  (FREESTYLE CM 2 READER) GALINA 1 DOSE CONTINUOUS 1 each 0    Continuous Glucose Sensor (FREESTYLE CM 2 SENSOR) MISC Use  one sensor every 14 days to read blood sugar 9 each 3    diclofenac (VOLTAREN) 1 % topical gel Apply 4 g topically 4 times daily 350 g 3    ferrous gluconate (FERGON) 324 (38 Fe) MG tablet Take 1 tablet (324 mg) by mouth daily (with breakfast) 90 tablet 3    gabapentin (NEURONTIN) 600 MG tablet TAKE 2 TABLETS (1,200 MG) BY MOUTH 3 TIMES DAILY FOR 30 DAYS 180 tablet 5    insulin glargine (LANTUS SOLOSTAR) 100 UNIT/ML pen INJECT 20 UNITS AT BEDTIME AS DIRECTED + 2 UNITS FOR PRIMING OF PEN. 30 mL 3    insulin pen needle (31G X 5 MM) 31G X 5 MM miscellaneous Use four times 4 day times a day 200 each 3    metFORMIN (GLUCOPHAGE XR) 500 MG 24 hr tablet Take 2 tablets (1,000 mg) by mouth 2 times daily (with meals) Take two tablets with breakfast and one tablet with dinner for one week then increase to two tablets with breakfast and two tablets with dinner thereafter (Patient taking differently: Take 1,000 mg by mouth daily (with dinner). Take two tablets with breakfast and one tablet with dinner for one week then increase to two tablets with breakfast and two tablets with dinner thereafter) 360 tablet 3    mirtazapine (REMERON) 7.5 MG tablet Take 1 tablet (7.5 mg) by mouth at bedtime. 30 tablet 5    Multiple Vitamin (DAILY-ISAAC MULTIVITAMIN) TABS TAKE 1 TABLET BY MOUTH EVERY DAY 90 tablet 0    nystatin (MYCOSTATIN) 287115 UNIT/GM external powder Apply 1 dose topically 3 times daily as needed 60 g 3    omeprazole (PRILOSEC) 20 MG DR capsule TAKE 2 CAPSULES (40 MG) BY MOUTH DAILY *TAKE 30-60 MINUTES BEFORE A MEAL* 180 capsule 2    ondansetron (ZOFRAN ODT) 4 MG ODT tab Place 4 mg under the tongue.      ONETOUCH ULTRA test strip USE TO TEST BLOOD SUGAR 4 TIMES DAILY OR AS DIRECTED. ACCU CHECK GUIDE STRIPS 400 strip 3    oxyCODONE-acetaminophen (PERCOCET) 5-325 MG tablet Take 1 tablet by mouth every 6 hours as needed for pain. 30 tablet 0    PARoxetine (PAXIL) 40 MG tablet Take 1 tablet (40 mg) by mouth every evening. 30 tablet  5    pioglitazone (ACTOS) 45 MG tablet Take 1 tablet (45 mg) by mouth daily 90 tablet 1    propranolol ER (INDERAL LA) 80 MG 24 hr capsule TAKE 1 CAPSULE BY MOUTH EVERY DAY 90 capsule 2    ramipril (ALTACE) 5 MG capsule TAKE 1 CAPSULE BY MOUTH EVERY DAY 90 capsule 0    rOPINIRole (REQUIP) 0.25 MG tablet TAKE 1 TABLET (0.25 MG) BY MOUTH EVERY EVENING 90 tablet 0    simvastatin (ZOCOR) 20 MG tablet TAKE 1 TABLET BY MOUTH EVERYDAY AT BEDTIME 90 tablet 0    Tirzepatide 10 MG/0.5ML SOAJ Inject 0.5 mLs (10 mg) subcutaneously every 7 days. 2 mL 0    traZODone (DESYREL) 50 MG tablet Take 50 mg by mouth.      vitamin C ([employer] VITAMIN C) 500 MG tablet 1/2 TABLET BY MOUTH JACOBSON WITH IRON SUPPLEMENT BEFORE MEAL 45 tablet 3    VITAMIN D3 25 MCG (1000 UT) tablet TAKE 1 TABLET BY MOUTH EVERY DAY 90 tablet 3       Allergies   Allergen Reactions    Morphine Sulfate Itching        Social History     Tobacco Use    Smoking status: Former     Current packs/day: 0.00     Types: Cigarettes     Quit date: 1974     Years since quittin.9     Passive exposure: Never    Smokeless tobacco: Never   Substance Use Topics    Alcohol use: Not Currently     Comment: social occasions     Family History   Problem Relation Age of Onset    Diabetes Mother         Natie    Cerebrovascular Disease Mother     Hypertension Mother     Diabetes Father     Hypertension Father     Cancer Maternal Grandfather     Cancer Paternal Grandfather     Diabetes Brother         Ayden    Diabetes Sister         Tomby    Thyroid Disease Daughter     Thyroid Disease Sister     Multiple Sclerosis Daughter     Glaucoma No family hx of     Macular Degeneration No family hx of      History   Drug Use No             Review of Systems  CONSTITUTIONAL: NEGATIVE for fever, chills, change in weight  INTEGUMENTARY/SKIN: NEGATIVE for non-healing lesion   EYES: NEGATIVE for vision changes or irritation  ENT/MOUTH: NEGATIVE for ear, mouth and throat problems  RESP: NEGATIVE for  "significant cough or SOB  CV: NEGATIVE for chest pain, palpitations or peripheral edema  GI: NEGATIVE for nausea, abdominal pain, heartburn, or change in bowel habits  : NEGATIVE for frequency, dysuria, or hematuria  MUSCULOSKELETAL:POSITIVE  for arthralgias *** and NEGATIVE for {:830645}  NEURO: NEGATIVE for weakness, dizziness or paresthesias  ENDOCRINE: POSITIVE  for HX diabetes and NEGATIVE for {:548113}  HEME/ALLERGY/IMMUNE: POSITIVE  for allergies and anemia and NEGATIVE for {:169300}  PSYCHIATRIC: POSITIVE forHx anxiety and Hx depression and NEGATIVE for{:657089}    Objective    BP 96/64 (BP Location: Right arm, Patient Position: Sitting, Cuff Size: Adult Regular)   Pulse 72   Temp 97.7  F (36.5  C) (Oral)   Resp 18   Ht 1.588 m (5' 2.5\")   Wt 84.1 kg (185 lb 6.4 oz)   SpO2 100%   BMI 33.37 kg/m     Estimated body mass index is 33.37 kg/m  as calculated from the following:    Height as of this encounter: 1.588 m (5' 2.5\").    Weight as of this encounter: 84.1 kg (185 lb 6.4 oz).  Physical Exam  {FEMALE - complete :732618}    Recent Labs   Lab Test 11/06/24  0928 09/19/24  1420 07/03/24  1314 05/23/24  1125 05/23/24  1044   HGB 11.4*  --   --   --   --    *  --  138   < >  --    POTASSIUM 4.6  --  4.8   < >  --    CR 1.00*  --  0.83   < >  --    A1C  --  7.7*  --   --  7.9*    < > = values in this interval not displayed.        Diagnostics  {LABS:443518}   EKG: {EKG FINDINGS:5101::\"appears normal, NSR\",\"normal axis, normal intervals, no acute ST/T changes c/w ischemia\",\"no LVH by voltage criteria\",\"unchanged from previous tracings\"}    Revised Cardiac Risk Index (RCRI)  The patient has the following serious cardiovascular risks for perioperative complications:  {PREOP REVISED CARDIAC RISK INDEX (RCRI) :995656}     RCRI Interpretation: {REVISED CARDIAC RISK INTERPRETATION :045789}         Signed Electronically by: KASIE Silver CNP  A copy of this evaluation report is provided to the " requesting physician.    {Provider Resources  Preop [facility] Preop Guidelines  Revised Cardiac Risk Index :094717}      mg/dL    Hemoglobin A1C 7.7 (H) 0.0 - 5.6 %   Basic metabolic panel    Collection Time: 12/07/24  2:07 PM   Result Value Ref Range    Sodium 135 135 - 145 mmol/L    Potassium 5.2 3.4 - 5.3 mmol/L    Chloride 99 98 - 107 mmol/L    Carbon Dioxide (CO2) 25 22 - 29 mmol/L    Anion Gap 11 7 - 15 mmol/L    Urea Nitrogen 17.8 8.0 - 23.0 mg/dL    Creatinine 0.83 0.51 - 0.95 mg/dL    GFR Estimate 76 >60 mL/min/1.73m2    Calcium 9.9 8.8 - 10.4 mg/dL    Glucose 163 (H) 70 - 99 mg/dL   Echo Stress Test with Definity    Collection Time: 12/13/24 12:08 PM   Result Value Ref Range    Pharmacologic Protocol Dobutamine     Test Type Pharmacological     Baseline HR 87     Baseline Systolic      Baseline Diastolic BP 60     Last Stress      Last Stress Systolic      Last Stress Diastolic BP 56     Target      PERCENT HR 85%     Exercise duration (min) 8     Exercise duration (sec) 27     Estimated workload 1.0     Pharm Test Reason Stopped      Pharmacologic Symptoms      Exercise Stage Reached      ST Deviation Elevation aVL 0.3mm     Deviation Time II -1.0mm     ST Elevation Amount aVR 0.5mm     ST Deviation Amount he II -0.8mm     Final Resting /64     Final Resting HR 99     Max Treadmill Speed 0.0     Max Treadmill Grade 0.0     Peak Systolic /67     Peak Diastolic /69     Max HR  152     Stress Phase Resting     Stress Resting Pt Position SUPINE     Current HR 85     Current /60     Stress Phase Stress     Stage Minute EXE 00:00     Exercise Stage      Current HR 85     Current /60     Stress Phase Stress     Stage Minute EXE 01:00     Exercise Stage      Current HR 84     Current /60     Stress Phase Stress     Stage Minute EXE 01:55     Exercise Stage      Current HR 85     Current /60     Stress Phase Stress     Stage Minute EXE 02:00     Exercise Stage      Current HR 85     Current /60     Stress Phase Stress     Stage Minute EXE 03:00     Exercise  Stage      Current HR 84     Current /60     Stress Phase Stress     Stage Minute EXE 04:00     Exercise Stage      Current HR 89     Current /60     Stress Phase Stress     Stage Minute EXE 05:00     Exercise Stage      Current      Current /60     Stress Phase Stress     Stage Minute EXE 05:01     Exercise Stage      Current      Current /67     Stress Phase Stress     Stage Minute EXE 06:00     Exercise Stage      Current      Current /67     Stress Phase Stress     Stage Minute EXE 07:00     Exercise Stage      Current      Current /67     Stress Phase Stress     Stage Minute EXE 07:44     Exercise Stage      Current      Current /56     Stress Phase Stress     Stage Minute EXE 08:00     Exercise Stage      Current      Current /56     Stress Phase Stress     Stage Minute EXE 08:27     Exercise Stage      Current      Current /56     Stress Phase Recovery     Stage Minute REC 00:32     Exercise Stage Recovery     Current      Current /56     Stress Phase Recovery     Stage Minute REC 01:11     Exercise Stage Recovery     Current      Current /61     Stress Phase Recovery     Stage Minute REC 01:32     Exercise Stage Recovery     Current      Current /61     Stress Phase Recovery     Stage Minute REC 02:32     Exercise Stage Recovery     Current      Current /61     Stress Phase Recovery     Stage Minute REC 02:56     Exercise Stage Recovery     Current      Current /69     Stress Phase Recovery     Stage Minute REC 03:32     Exercise Stage Recovery     Current      Current /69     Stress Phase Recovery     Stage Minute REC 04:32     Exercise Stage Recovery     Current      Current /69     Stress Phase Recovery     Stage Minute REC 05:32     Exercise Stage Recovery     Current      Current /69     Stress Phase Recovery      Stage Minute REC 06:32     Exercise Stage Recovery     Current      Current /69     Stress Phase Recovery     Stage Minute REC 07:32     Exercise Stage Recovery     Current      Current /69     Stress Phase Recovery     Stage Minute REC 07:54     Exercise Stage Recovery     Current      Current /64     Stress Phase Recovery     Stage Minute REC 08:32     Exercise Stage Recovery     Current HR 99     Current /64     Stress Phase Recovery     Stage Minute REC 09:32     Exercise Stage Recovery     Current HR 98     Current /64     Stress Phase Recovery     Stage Minute REC 09:52     Exercise Stage Recovery     Current HR 99     Current /64         Normal Sinus Rhythm, rate 70, no acute ST-T wave changes suggestive of ischemia, no LVH by voltage criteria, Q waves in anterior leads      Revised Cardiac Risk Index (RCRI)  The patient has the following serious cardiovascular risks for perioperative complications:   - Coronary Artery Disease (MI, positive stress test, angina, Qs on EKG) = 1 point   - Diabetes Mellitus (on Insulin) = 1 point     RCRI Interpretation: 2 points: Class III (moderate risk - 6.6% complication rate)     Estimated Functional Capacity: Performs 4 METS exercise without symptoms (e.g., light housework, stairs, 4 mph walk, 7 mph bike, slow step dance)           Signed Electronically by: KASIE Silver CNP  A copy of this evaluation report is provided to the requesting physician.

## 2024-12-02 NOTE — PROGRESS NOTES
Prior to immunization administration, verified patients identity using patient s name and date of birth. Please see Immunization Activity for additional information.     Screening Questionnaire for Adult Immunization    Are you sick today?   No   Do you have allergies to medications, food, a vaccine component or latex?   No   Have you ever had a serious reaction after receiving a vaccination?   No   Do you have a long-term health problem with heart, lung, kidney, or metabolic disease (e.g., diabetes), asthma, a blood disorder, no spleen, complement component deficiency, a cochlear implant, or a spinal fluid leak?  Are you on long-term aspirin therapy?   No   Do you have cancer, leukemia, HIV/AIDS, or any other immune system problem?   No   Do you have a parent, brother, or sister with an immune system problem?   No   In the past 3 months, have you taken medications that affect  your immune system, such as prednisone, other steroids, or anticancer drugs; drugs for the treatment of rheumatoid arthritis, Crohn s disease, or psoriasis; or have you had radiation treatments?   No   Have you had a seizure, or a brain or other nervous system problem?   No   During the past year, have you received a transfusion of blood or blood    products, or been given immune (gamma) globulin or antiviral drug?   No   For women: Are you pregnant or is there a chance you could become       pregnant during the next month?   No   Have you received any vaccinations in the past 4 weeks?   No     Immunization questionnaire answers were all negative.      Patient instructed to remain in clinic for 15 minutes afterwards, and to report any adverse reactions.     Screening performed by Maria Del Rosario Benoit on 12/2/2024 at 10:42 AM.

## 2024-12-02 NOTE — RESULT ENCOUNTER NOTE
Guerline Muir,    Attached are your test results.  -Hemoglobin is decreased indicating anemia.  Appears stable   -White blood cell and platelet counts are normal.  -A1C (test of diabetes control the last 2-3 months) is slightly above your goal    Please recheck your A1C test in 3 months.  Please work on your diet     Please contact us if you have any questions.    Lacie Harrington, CNP

## 2024-12-02 NOTE — PATIENT INSTRUCTIONS
How to Take Your Medication Before Surgery  Preoperative Medication Instructions   Antiplatelet or Anticoagulation Medication Instructions   - aspirin: Discontinue aspirin 7-10 days prior to procedure to reduce bleeding risk. It should be resumed postoperatively.     Additional Medication Instructions  Take all scheduled medications on the day of surgery EXCEPT for modifications listed below:   - ACE/ARB: DO NOT TAKE on day of surgery (minimum 11 hours for general anesthesia).ramipril    - Beta Blockers: Continue taking on the day of surgery.propranolol    - Statins: Continue taking on the day of surgery. Cholesterol med    - Long acting insulin (e.g. glargine, detemir): Take 80% of the usual evening or morning dose before surgery.  Lantus 16 units    - metformin: DO NOT TAKE day of surgery.   - Thiazolidinedione (e.g. rosiglitazone, pioglitazone): DO NOT TAKE day of surgery.Actos    - GLP-1 Injectable (exenitide, liraglutide, semaglutide, dulaglutide, etc.): DO NOT TAKE 7 days before surgery    - pregabalin, gabapentin: Continue without modification. Mounjaro    - SSRIs, SNRIs, TCAs, Antipsychotics: Continue without modification.        Patient Education   Preparing for Your Surgery  For Adults  Getting started  In most cases, a nurse will call to review your health history and instructions. They will give you an arrival time based on your scheduled surgery time. Please be ready to share:  Your doctor's clinic name and phone number  Your medical, surgical, and anesthesia history  A list of allergies and sensitivities  A list of medicines, including herbal treatments and over-the-counter drugs  Whether the patient has a legal guardian (ask how to send us the papers in advance)  Note: You may not receive a call if you were seen at our PAC (Preoperative Assessment Center).  Please tell us if you're pregnant--or if there's any chance you might be pregnant. Some surgeries may injure a fetus (unborn baby), so they  require a pregnancy test. Surgeries that are safe for a fetus don't always need a test, and you can choose whether to have one.   Preparing for surgery  Within 10 to 30 days of surgery: Have a pre-op exam (sometimes called an H&P, or History and Physical). This can be done at a clinic or pre-operative center.  If you're having a , you may not need this exam. Talk to your care team.  At your pre-op exam, talk to your care team about all medicines you take. (This includes CBD oil and any drugs, such as THC, marijuana, and other forms of cannabis.) If you need to stop any medicine before surgery, ask when to start taking it again.  This is for your safety. Many medicines and drugs can make you bleed too much during surgery. Some change how well surgery (anesthesia) drugs work.  Call your insurance company to let them know you're having surgery. (If you don't have insurance, call [phone].)  Call your clinic if there's any change in your health. This includes a scrape or scratch near the surgery site, or any signs of a cold (sore throat, runny nose, cough, rash, fever).  Eating and drinking guidelines  For your safety: Unless your surgeon tells you otherwise, follow the guidelines below.  Eat and drink as normal until 8 hours before you arrive for surgery. After that, no food or milk. You can spit out gum when you arrive.  Drink clear liquids until 2 hours before you arrive. These are liquids you can see through, like water, Gatorade, and Propel Water. They also include plain black coffee and tea (no cream or milk).  No alcohol for 24 hours before you arrive. The night before surgery, stop any drinks that contain THC.  If your care team tells you to take medicine on the morning of surgery, it's okay to take it with a sip of water. No other medicines or drugs are allowed (including CBD oil)--follow your care team's instructions.  If you have questions the day of surgery, call your hospital or surgery center.    Preventing infection  Shower or bathe the night before and the morning of surgery. Follow the instructions your clinic gave you. (If no instructions, use regular soap.)  Don't shave or clip hair near your surgery site. We'll remove the hair if needed.  Don't smoke or vape the morning of surgery. No chewing tobacco for 6 hours before you arrive. A nicotine patch is okay. You may spit out nicotine gum when you arrive.  For some surgeries, the surgeon will tell you to fully quit smoking and nicotine.  We will make every effort to keep you safe from infection. We will:  Clean our hands often with soap and water (or an alcohol-based hand rub).  Clean the skin at your surgery site with a special soap that kills germs.  Give you a special gown to keep you warm. (Cold raises the risk of infection.)  Wear hair covers, masks, gowns, and gloves during surgery.  Give antibiotic medicine, if prescribed. Not all surgeries need this medicine.  What to bring on the day of surgery  Photo ID and insurance card  Copy of your health care directive, if you have one  Glasses and hearing aids (bring cases)  You can't wear contacts during surgery  Inhaler and eye drops, if you use them (tell us about these when you arrive)  CPAP machine or breathing device, if you use them  A few personal items, if spending the night  If you have . . .  A pacemaker, ICD (cardiac defibrillator), or other implant: Bring the ID card.  An implanted stimulator: Bring the remote control.  A legal guardian: Bring a copy of the certified (court-stamped) guardianship papers.  Please remove any jewelry, including body piercings. Leave jewelry and other valuables at home.  If you're going home the day of surgery  You must have a responsible adult drive you home. They should stay with you overnight as well.  If you don't have someone to stay with you, and you aren't safe to go home alone, we may keep you overnight. Insurance often won't pay for this.  After  surgery  If it's hard to control your pain or you need more pain medicine, please call your surgeon's office.  Questions?   If you have any questions for your care team, list them here:   ____________________________________________________________________________________________________________________________________________________________________________________________________________________________________________________________  For informational purposes only. Not to replace the advice of your health care provider. Copyright   2003, 2019 [facility]. All rights reserved. Clinically reviewed by Sharif Najera MD. [employer] 850970 - REV 08/24.

## 2024-12-03 ENCOUNTER — TELEPHONE (OUTPATIENT)
Dept: FAMILY MEDICINE | Facility: CLINIC | Age: 69
End: 2024-12-03
Payer: COMMERCIAL

## 2024-12-03 NOTE — TELEPHONE ENCOUNTER
"This writer attempted to contact patient on 12/03/24.    Reason for call lab results and left message to call clinic back at [phone].    If patient calls back:   Please relay message below and assist with scheduling lab appointment (BMP ordered).    ----- Message from Lacie Harrington sent at 12/3/2024  8:02 AM CST -----  \"Please call   -Kidney function (GFR) is decreased.  ADVISE: Need a recheck on this week  as significant drop   Has she been taking a lot of NSAIDs   -Sodium is decreased.  ADVISE: need recheck this .  -Potassium is normal.  -Calcium is normal.  -Glucose is elevated due to your diabetes.  Lacie Harrington, NP, APRN CNP\"    EVELYN Francis  [facility]  "

## 2024-12-04 ENCOUNTER — PATIENT OUTREACH (OUTPATIENT)
Dept: GERIATRIC MEDICINE | Facility: CLINIC | Age: 69
End: 2024-12-04
Payer: COMMERCIAL

## 2024-12-04 NOTE — LETTER
December 5, 2024    HOLLY FELDMAN  [address] [address] [address]        Dear Holly:    As a member of [facility], we offer a health risk assessment at no cost to you. I know you don't want to have the assessment right now. If you change your mind, please call me at the number below.    Who performs the health risk assessment?  A [facility] Care Coordinator performs the assessment. Our Care Coordinators can also help you understand your benefits. They can tell you about services to aid you at home, such as managing your care with your doctors if your health worsens.    Our Care Coordinators are here for you if you need:  Support for activities you used to do by yourself (including making meals, bathing, and paying bills)  Equipment for bathroom or home safety  Help finding a new place to live  Information on staying healthy, preventing falls and immunizations    Questions?  If you have questions, or you would like to do the assessment, call me at [phone]. TTY users call [phone]. I'm here from 8am to 5pm. I may reach out to you again soon.      Sincerely,          Malathi Bacon RN, PHN  [phone]  [email or web address]      [employer]    <[id number] accepted    [id number] (12/2021)  [id number]>

## 2024-12-04 NOTE — Clinical Note
This member is on the state program MSHO/MSC+. As the Care Coordinator we are required to notify the Physician when a member refuses an annual assessment for this program. If you have further questions please contact me. Malathi Bacon RN [facility] Care Coordinator [phone]

## 2024-12-04 NOTE — PROGRESS NOTES
[facility] Care Coordination Contact      [facility] Refusal Telephone Assessment    Member refused home visit HRA on 12/04/24 (reason: member reports that she is having surgery on her left knee on 12/18/24. Care Coordinator offered to see her before that, but she reports that she has a very detailed plan to taper off medications before her surgery and when she isn't on her medications she has a hard time functioning. She asked if Care Coordinator can assess her at the 6 month instead.).    ER visits: Yes -  [facility]  x2- nausea/UTI and cysitis/nausea.   Hospitalizations: No  Health concerns: having left knee surgery- concerns about taper off meds and back on.   Falls/Injuries: Yes: reports multiple falls over the last year due to poor balance and left knee pain  ADL/IADL Dependencies: yes, has PCA provided by family member.         Member currently receiving the following home care services:   PCA: 5 hours per day, home delivered meals, PERS, automatic medication machine  Member currently receiving the following community resources:    Informal support(s):  daughters    Advanced Care Planning discussion, complete code section.    [facility] sponsored benefits: Shared information re: Silver Sneakers/gym memberships, ASA, Calcium +D.    Follow-Up Plan: Member informed of future contact, plan to f/u with member with a 6 month telephone assessment and offer a home visit.  Contact information shared with member and family, encouraged member to call with any questions or concerns at any time.    This CC note routed to PCP, Tiffany King.    Malathi Bacon RN PHN  Care Coordinator  [facility]  O: [phone]  C:[phone]  F:[phone]

## 2024-12-04 NOTE — TELEPHONE ENCOUNTER
Second attempt. Left message for patient to return call to clinic per Lacie Harrington NP message as noted:     KASIE Silver CNP  12/3/2024  8:02 AM CST       Please call  -Kidney function (GFR) is decreased.  ADVISE: Need a recheck on this week  as significant drop  Has she been taking a lot of NSAIDs  -Sodium is decreased.  ADVISE: need recheck this .  -Potassium is normal.  -Calcium is normal.  -Glucose is elevated due to your diabetes.  Lacie Harrington NP, APRN CNP     RN, if/when patient returns call, please review message as shown. Efraín Liu, RN, BSN

## 2024-12-05 NOTE — TELEPHONE ENCOUNTER
Patient notified of provider message as written.  Patient verbalized understanding.    Appointment made for patient to get labs done at [facility] on 12/8/24.    Kristina Kjellberg, MSN, RN

## 2024-12-05 NOTE — PROGRESS NOTES
"[facility] Care Coordination Contact    Per CC, mailed client a \"Refusal of Home Visit\" letter and uploaded Refusal POC to ANDIE Hendricks  Care Management Specialist   [facility]   [phone]    "

## 2024-12-05 NOTE — TELEPHONE ENCOUNTER
This RN attempted to reach patient on 12/5/24.  Left message to return call.      If they call back:    Please give provider message as written below.      Kristina Kjellberg, MSN, RN

## 2024-12-07 ENCOUNTER — LAB (OUTPATIENT)
Dept: LAB | Facility: CLINIC | Age: 69
End: 2024-12-07
Payer: COMMERCIAL

## 2024-12-07 DIAGNOSIS — N28.9 FUNCTION KIDNEY DECREASED: ICD-10-CM

## 2024-12-07 PROCEDURE — 80048 BASIC METABOLIC PNL TOTAL CA: CPT

## 2024-12-07 PROCEDURE — 36415 COLL VENOUS BLD VENIPUNCTURE: CPT

## 2024-12-08 LAB
ANION GAP SERPL CALCULATED.3IONS-SCNC: 11 MMOL/L (ref 7–15)
BUN SERPL-MCNC: 17.8 MG/DL (ref 8–23)
CALCIUM SERPL-MCNC: 9.9 MG/DL (ref 8.8–10.4)
CHLORIDE SERPL-SCNC: 99 MMOL/L (ref 98–107)
CREAT SERPL-MCNC: 0.83 MG/DL (ref 0.51–0.95)
EGFRCR SERPLBLD CKD-EPI 2021: 76 ML/MIN/1.73M2
GLUCOSE SERPL-MCNC: 163 MG/DL (ref 70–99)
HCO3 SERPL-SCNC: 25 MMOL/L (ref 22–29)
POTASSIUM SERPL-SCNC: 5.2 MMOL/L (ref 3.4–5.3)
SODIUM SERPL-SCNC: 135 MMOL/L (ref 135–145)

## 2024-12-09 NOTE — RESULT ENCOUNTER NOTE
Guerline Muir,    Attached are your test results.  -Kidney function (GFR) is normal.  -Sodium is normal.  -Potassium is normal.  -Calcium is normal.  -Glucose is elevated due to your diabetes.   Please contact us if you have any questions.    Lacie Harrington, CNP

## 2024-12-10 NOTE — PROGRESS NOTES
Medication Therapy Management (MTM) Encounter    ASSESSMENT:                            Medication Adherence/Access: See below for considerations    Diabetes:   Patient is not meeting A1c goal of < 7%.May benefit from increasing Mounjaro dose and Lantus dose. Low blood sugars resolved with discontinuation of glimepiride.    PLAN:                            Increase Lantus to 20 units  Increase Mounjaro 12.5mg weekly    Follow-up: 8 weeks    SUBJECTIVE/OBJECTIVE:                          Holly Muir is a 69 year old female seen for a follow-up visit.  Today's visit is a follow-up MTM visit from 11/26/2024.     Reason for visit:  blood sugars; glimepiride d/c    Allergies/ADRs: Reviewed in chart  Past Medical History: Reviewed in chart  Tobacco: She reports that she quit smoking about 47 years ago. Her smoking use included cigarettes. She started smoking about 48 years ago. She has never used smokeless tobacco.  Alcohol: not currently using  Lives between two daughters.     Medication Adherence/Access: daughters help manage medications. Uses a pill box.   Per patient, daughters hand her her medications to her.  Has timer on her phone to remind her when to take her insulin and this has been helpful.  The patient fills medications at [facility]: NO, fills medications at [facility]    Diabetes   Type 2 Diabetes:    Lantus 20 units daily-patient has only been taking 15 units  Mounjaro 10mg weekly-has 2 doses left; has surgery on 12/8; patient aware she took her last dose until after surgery on 12/9  Actos 45mg daily  Metformin XR 1000mg twice daily  Not taking aspirin due to Apixiban    Patient is not experiencing side effects.  Blood sugar monitoring: CGM-  7 day average   12am-161  3am-173  6am-184  9am-211  12pm-145  3pm-133   6pm-147  9pm- 149     Time in [employer] % >240mg-10% 181-240 48% -42%    Current diabetes symptoms: None  Appetite is less, when out to eat only eats half of a burger  Doesn't  eat until 10 or 11am. Will have coffee with nonsugar creamer in coffee when she wakes up.   Has been trying to walk more and goes  to the [facility] center, walking, chair leg exercises.  Drinks water, rarely has a soda  Follows with endocrinology  Starting weight 193lb. Current weight 187lb     Eye exam in the last 12 months? No  Foot exam: Yes  Is having surgery 12/18.      ----------------  I spent 10 minutes with this patient today. All changes were made via collaborative practice agreement with Dr. King. A copy of the visit note was provided to the patient's provider(s).    A summary of these recommendations was sent via [employer].    Telemedicine Visit Details  The patient's medications can be safely assessed via a telemedicine encounter.  Type of service:  Telephone visit  Originating Location (pt. Location): Home    Distant Location (provider location):  On-site  Start Time: 10:31AM  End Time:10:41AM     Medication Therapy Recommendations  Type 2 diabetes mellitus with hyperglycemia, with long-term current use of insulin (H)   1 Current Medication: Tirzepatide 10 MG/0.5ML SOAJ (Discontinued)   Current Medication Sig: Inject 0.5 mLs (10 mg) subcutaneously every 7 days.   Rationale: Dose too low - Dosage too low - Effectiveness   Recommendation: Increase Dose   Status: Accepted per CPA   Identified Date: 12/11/2024 Completed Date: 12/11/2024         2 Current Medication: insulin glargine (LANTUS SOLOSTAR) 100 UNIT/ML pen   Current Medication Sig: INJECT 20 UNITS AT BEDTIME AS DIRECTED + 2 UNITS FOR PRIMING OF PEN.   Rationale: Dose too low - Dosage too low - Effectiveness   Recommendation: Increase Dose   Status: Patient Agreed - Adherence/Education   Identified Date: 12/11/2024 Completed Date: 12/11/2024

## 2024-12-11 ENCOUNTER — VIRTUAL VISIT (OUTPATIENT)
Dept: PHARMACY | Facility: CLINIC | Age: 69
End: 2024-12-11
Payer: COMMERCIAL

## 2024-12-11 DIAGNOSIS — Z79.4 TYPE 2 DIABETES MELLITUS WITHOUT COMPLICATION, WITH LONG-TERM CURRENT USE OF INSULIN (H): Primary | ICD-10-CM

## 2024-12-11 DIAGNOSIS — E11.9 TYPE 2 DIABETES MELLITUS WITHOUT COMPLICATION, WITH LONG-TERM CURRENT USE OF INSULIN (H): Primary | ICD-10-CM

## 2024-12-11 PROCEDURE — 99607 MTMS BY PHARM ADDL 15 MIN: CPT | Mod: 93 | Performed by: PHARMACIST

## 2024-12-11 PROCEDURE — 99606 MTMS BY PHARM EST 15 MIN: CPT | Mod: 93 | Performed by: PHARMACIST

## 2024-12-11 RX ORDER — TIRZEPATIDE 12.5 MG/.5ML
12.5 INJECTION, SOLUTION SUBCUTANEOUS
Qty: 2 ML | Refills: 0 | Status: SHIPPED | OUTPATIENT
Start: 2024-12-11

## 2024-12-11 NOTE — PATIENT INSTRUCTIONS
"Recommendations from today's MTM visit:                                                         Increase Lantus to 20 units  Increase Mounjaro 12.5mg weekly    Follow-up: 8 weeks    It was great speaking with you today.  I value your experience and would be very thankful for your time in providing feedback in our clinic survey. In the next few days, you may receive an email or text message from [facility] with a link to a survey related to your  clinical pharmacist.\"     To schedule another MTM appointment, please call the clinic directly or you may call the MTM scheduling line at [phone] or toll-free at [phone].     My Clinical Pharmacist's contact information:                                                      Please feel free to contact me with any questions or concerns you have.      Liz Calero, Pharm.D, [facility]  Medication Therapy Management Pharmacist      "

## 2024-12-13 ENCOUNTER — HOSPITAL ENCOUNTER (OUTPATIENT)
Dept: CARDIOLOGY | Facility: CLINIC | Age: 69
Discharge: HOME | End: 2024-12-13
Attending: INTERNAL MEDICINE | Admitting: INTERNAL MEDICINE
Payer: COMMERCIAL

## 2024-12-13 VITALS — HEART RATE: 105 BPM | SYSTOLIC BLOOD PRESSURE: 163 MMHG | DIASTOLIC BLOOD PRESSURE: 69 MMHG

## 2024-12-13 DIAGNOSIS — R94.31 NONSPECIFIC ST-T WAVE ELECTROCARDIOGRAPHIC CHANGES: ICD-10-CM

## 2024-12-13 DIAGNOSIS — E11.65 TYPE 2 DIABETES MELLITUS WITH HYPERGLYCEMIA, WITH LONG-TERM CURRENT USE OF INSULIN (H): ICD-10-CM

## 2024-12-13 DIAGNOSIS — Z01.818 PREOP GENERAL PHYSICAL EXAM: ICD-10-CM

## 2024-12-13 DIAGNOSIS — Z79.4 TYPE 2 DIABETES MELLITUS WITH HYPERGLYCEMIA, WITH LONG-TERM CURRENT USE OF INSULIN (H): ICD-10-CM

## 2024-12-13 LAB
CV STRESS CURRENT BP HE: NORMAL
CV STRESS CURRENT HR HE: 100
CV STRESS CURRENT HR HE: 101
CV STRESS CURRENT HR HE: 103
CV STRESS CURRENT HR HE: 103
CV STRESS CURRENT HR HE: 104
CV STRESS CURRENT HR HE: 106
CV STRESS CURRENT HR HE: 107
CV STRESS CURRENT HR HE: 111
CV STRESS CURRENT HR HE: 112
CV STRESS CURRENT HR HE: 113
CV STRESS CURRENT HR HE: 116
CV STRESS CURRENT HR HE: 118
CV STRESS CURRENT HR HE: 123
CV STRESS CURRENT HR HE: 128
CV STRESS CURRENT HR HE: 143
CV STRESS CURRENT HR HE: 146
CV STRESS CURRENT HR HE: 151
CV STRESS CURRENT HR HE: 151
CV STRESS CURRENT HR HE: 84
CV STRESS CURRENT HR HE: 84
CV STRESS CURRENT HR HE: 85
CV STRESS CURRENT HR HE: 89
CV STRESS CURRENT HR HE: 98
CV STRESS CURRENT HR HE: 99
CV STRESS CURRENT HR HE: 99
CV STRESS DEVIATION TIME HE: NORMAL
CV STRESS ECHO PERCENT HR HE: NORMAL
CV STRESS EXERCISE STAGE HE: NORMAL
CV STRESS EXERCISE STAGE REACHED HE: NORMAL
CV STRESS FINAL RESTING BP HE: NORMAL
CV STRESS FINAL RESTING HR HE: 99
CV STRESS MAX HR HE: 152
CV STRESS MAX TREADMILL GRADE HE: 0
CV STRESS MAX TREADMILL SPEED HE: 0
CV STRESS PEAK DIA BP HE: NORMAL
CV STRESS PEAK SYS BP HE: NORMAL
CV STRESS PHASE HE: NORMAL
CV STRESS PROTOCOL HE: NORMAL
CV STRESS REASON STOPPED HE: NORMAL
CV STRESS RESTING PT POSITION HE: NORMAL
CV STRESS ST DEVIATION AMOUNT HE: NORMAL
CV STRESS ST DEVIATION ELEVATION HE: NORMAL
CV STRESS ST EVELATION AMOUNT HE: NORMAL
CV STRESS SYMPTOMS HE: NORMAL
CV STRESS TEST TYPE HE: NORMAL
CV STRESS TOTAL STAGE TIME MIN 1 HE: NORMAL
STRESS ECHO BASELINE DIASTOLIC HE: 60
STRESS ECHO BASELINE HR: 87
STRESS ECHO BASELINE SYSTOLIC BP: 127
STRESS ECHO LAST STRESS DIASTOLIC BP: 56
STRESS ECHO LAST STRESS HR: 146
STRESS ECHO LAST STRESS SYSTOLIC BP: 157
STRESS ECHO POST ESTIMATED WORKLOAD: 1
STRESS ECHO POST EXERCISE DUR MIN: 8
STRESS ECHO POST EXERCISE DUR SEC: 27
STRESS ECHO TARGET HR: 129

## 2024-12-13 PROCEDURE — 258N000003 HC RX IP 258 OP 636: Performed by: INTERNAL MEDICINE

## 2024-12-13 PROCEDURE — 250N000011 HC RX IP 250 OP 636: Performed by: INTERNAL MEDICINE

## 2024-12-13 PROCEDURE — C8928 TTE W OR W/O FOL W/CON,STRES: HCPCS

## 2024-12-13 PROCEDURE — 250N000009 HC RX 250: Performed by: INTERNAL MEDICINE

## 2024-12-13 PROCEDURE — 255N000002 HC RX 255 OP 636: Performed by: INTERNAL MEDICINE

## 2024-12-13 RX ORDER — LIDOCAINE 40 MG/G
CREAM TOPICAL
Status: DISCONTINUED | OUTPATIENT
Start: 2024-12-13 | End: 2024-12-14 | Stop reason: HOSPADM

## 2024-12-13 RX ORDER — METOPROLOL TARTRATE 1 MG/ML
1-5 INJECTION, SOLUTION INTRAVENOUS
Status: ACTIVE | OUTPATIENT
Start: 2024-12-13 | End: 2024-12-13

## 2024-12-13 RX ORDER — DOBUTAMINE HYDROCHLORIDE 200 MG/100ML
10-50 INJECTION INTRAVENOUS CONTINUOUS
Status: ACTIVE | OUTPATIENT
Start: 2024-12-13 | End: 2024-12-13

## 2024-12-13 RX ORDER — ATROPINE SULFATE 0.4 MG/ML
.2-.4 AMPUL (ML) INJECTION
Status: DISCONTINUED | OUTPATIENT
Start: 2024-12-13 | End: 2024-12-14 | Stop reason: HOSPADM

## 2024-12-13 RX ADMIN — METOPROLOL TARTRATE 3 MG: 5 INJECTION INTRAVENOUS at 11:46

## 2024-12-13 RX ADMIN — METOPROLOL TARTRATE 3 MG: 5 INJECTION INTRAVENOUS at 11:51

## 2024-12-13 RX ADMIN — ATROPINE SULFATE 0.4 MG: 0.4 INJECTION, SOLUTION INTRAVENOUS at 11:42

## 2024-12-13 RX ADMIN — DEXTROSE MONOHYDRATE 10 MCG/KG/MIN: 50 INJECTION, SOLUTION INTRAVENOUS at 11:35

## 2024-12-13 RX ADMIN — METOPROLOL TARTRATE 2 MG: 5 INJECTION INTRAVENOUS at 11:48

## 2024-12-13 RX ADMIN — PERFLUTREN 6 ML: 6.52 INJECTION, SUSPENSION INTRAVENOUS at 11:53

## 2024-12-13 NOTE — PROGRESS NOTES
Pt here for dobutamine stress test. Test, meds and side effects reviewed with patient. Achieved target HR at 30 mcg Dobutamine and a total of 0.4 mg IV atropine. Gave a total of 8 mg IV Metoprolol to bring HR back to baseline. Post monitoring complete and VSS. Pt escorted out to the gold waiting room.

## 2024-12-16 ENCOUNTER — MYC REFILL (OUTPATIENT)
Dept: ORTHOPEDICS | Facility: CLINIC | Age: 69
End: 2024-12-16
Payer: COMMERCIAL

## 2024-12-16 DIAGNOSIS — M17.12 PRIMARY OSTEOARTHRITIS OF LEFT KNEE: ICD-10-CM

## 2024-12-16 RX ORDER — OXYCODONE AND ACETAMINOPHEN 5; 325 MG/1; MG/1
1 TABLET ORAL EVERY 6 HOURS PRN
Qty: 30 TABLET | Refills: 0 | Status: ON HOLD | OUTPATIENT
Start: 2024-12-16 | End: 2024-12-19

## 2024-12-17 NOTE — PROGRESS NOTES
Ortho Navigator Note      Pre-op Date 12/2/24      Medical Clearance  Cleared      Labs WNR      COVID Test Date No longer indicated       Skin  Intact       Activity: Ambulates independently with FW walker. Requires help with washing her back and feet as well as dressing. Family runs errands for her as well.       Equipment Need Patient will bring a walker for discharge. Defer to PT/OT for recs.  Colostomy appliance in place. Patient was instructed to bring the supplies that she uses for reference.       Meds to Hold Preoperative Medication Instructions  Antiplatelet or Anticoagulation Medication Instructions   - aspirin: Discontinue aspirin 7-10 days prior to procedure to reduce bleeding risk. It should be resumed postoperatively.      Additional Medication Instructions  Take all scheduled medications on the day of surgery EXCEPT for modifications listed below:   - ACE/ARB: DO NOT TAKE on day of surgery (minimum 11 hours for general anesthesia).   - Beta Blockers: Continue taking on the day of surgery.   - Statins: Continue taking on the day of surgery.    - Long acting insulin (e.g. glargine, detemir): Take 80% of the usual evening or morning dose before surgery.     - metformin: DO NOT TAKE day of surgery.   - Thiazolidinedione (e.g. rosiglitazone, pioglitazone): DO NOT TAKE day of surgery.   - GLP-1 Injectable (exenitide, liraglutide, semaglutide, dulaglutide, etc.): DO NOT TAKE 7 days before surgery    - pregabalin, gabapentin: Continue without modification.   - SSRIs, SNRIs, TCAs, Antipsychotics: Continue without modification.   * Medication recommendations are not intended to be exhaustive; they are limited to common medications that are potentially dangerous if incorrectly managed   NPO Instructions  Instructed to stop solids 8 hr prior to arrival and clears 2 hr prior to arrival.        Pre-op Joint Education Complete? Completed prior to previous TJA in 23'   Discharge Plan Patient has plan to discharge  home on morning of POD 1.   Daughter Mirna will arrive at hospital at [id number] to participate in therapy and discharge education. They will then transport patient home    /Transportation Dede will be coaches.  is physically able to care for patient.       Barriers to Discharge No barriers to discharge.       Additional Info/   Special Needs : Colostomy bag in place. Patient states that there is no Skin irritation and the site is intact and appears healthy. Advised her to keep clean and dry leading up to surgery.         12/17/24 0923   Discharge Planning   Patient/Family Anticipates Transition to home with family   Concerns to be Addressed no discharge needs identified   Living Arrangements   People in Home child(ervin), adult   Type of Residence Private Residence   Is your private residence a single family home or apartment? Single family home   Number of Stairs, Within Home, Primary seven   Stair Railings, Within Home, Primary railing on left side (ascending)   Once home, are you able to live on one level? Yes   Which level? Main Level   Bathroom Shower/Tub Tub/Shower unit   Equipment Currently Used at Home colostomy/ostomy supplies;cane, straight   Support System   Support Systems Children   Do you have someone available to stay with you one or two nights once you are home? Yes   Medical Clearance   Date of Physical 12/02/24   Clinic Name 12/2/2024  [facility]   Known Bleeding Disorder or Coagulopathy No   Does the patient have any Mandaen/cultural preferences related to blood products? No   Education   Has the patient scheduled or completed pre-op total joint education, either in class or online, in the last 12 months? No   Patient attended total joint pre-op class/received pre-op teaching  online   Relationship/Environment   Name(s) of People in Home Mirna

## 2024-12-18 ENCOUNTER — APPOINTMENT (OUTPATIENT)
Dept: GENERAL RADIOLOGY | Facility: CLINIC | Age: 69
End: 2024-12-18
Attending: PHYSICIAN ASSISTANT
Payer: COMMERCIAL

## 2024-12-18 ENCOUNTER — ANESTHESIA EVENT (OUTPATIENT)
Dept: SURGERY | Facility: CLINIC | Age: 69
End: 2024-12-18
Payer: COMMERCIAL

## 2024-12-18 ENCOUNTER — HOSPITAL ENCOUNTER (OUTPATIENT)
Facility: CLINIC | Age: 69
Setting detail: OBSERVATION
LOS: 1 days | Discharge: HOME | End: 2024-12-19
Attending: ORTHOPAEDIC SURGERY | Admitting: ORTHOPAEDIC SURGERY
Payer: COMMERCIAL

## 2024-12-18 ENCOUNTER — ANESTHESIA (OUTPATIENT)
Dept: SURGERY | Facility: CLINIC | Age: 69
End: 2024-12-18
Payer: COMMERCIAL

## 2024-12-18 DIAGNOSIS — E11.9 TYPE 2 DIABETES MELLITUS WITHOUT COMPLICATION, WITH LONG-TERM CURRENT USE OF INSULIN (H): Primary | ICD-10-CM

## 2024-12-18 DIAGNOSIS — Z79.4 TYPE 2 DIABETES MELLITUS WITHOUT COMPLICATION, WITH LONG-TERM CURRENT USE OF INSULIN (H): Primary | ICD-10-CM

## 2024-12-18 DIAGNOSIS — E87.1 HYPONATREMIA: ICD-10-CM

## 2024-12-18 DIAGNOSIS — Z96.652 S/P TOTAL KNEE ARTHROPLASTY, LEFT: Primary | ICD-10-CM

## 2024-12-18 LAB
GLUCOSE BLDC GLUCOMTR-MCNC: 148 MG/DL (ref 70–99)
GLUCOSE BLDC GLUCOMTR-MCNC: 164 MG/DL (ref 70–99)
GLUCOSE BLDC GLUCOMTR-MCNC: 231 MG/DL (ref 70–99)

## 2024-12-18 PROCEDURE — 250N000013 HC RX MED GY IP 250 OP 250 PS 637: Performed by: PHYSICIAN ASSISTANT

## 2024-12-18 PROCEDURE — 250N000011 HC RX IP 250 OP 636: Performed by: PHYSICIAN ASSISTANT

## 2024-12-18 PROCEDURE — 250N000011 HC RX IP 250 OP 636: Performed by: NURSE ANESTHETIST, CERTIFIED REGISTERED

## 2024-12-18 PROCEDURE — 99252 IP/OBS CONSLTJ NEW/EST SF 35: CPT

## 2024-12-18 PROCEDURE — 250N000013 HC RX MED GY IP 250 OP 250 PS 637: Performed by: STUDENT IN AN ORGANIZED HEALTH CARE EDUCATION/TRAINING PROGRAM

## 2024-12-18 PROCEDURE — C1713 ANCHOR/SCREW BN/BN,TIS/BN: HCPCS | Performed by: ORTHOPAEDIC SURGERY

## 2024-12-18 PROCEDURE — 258N000003 HC RX IP 258 OP 636: Performed by: PHYSICIAN ASSISTANT

## 2024-12-18 PROCEDURE — 999N000141 HC STATISTIC PRE-PROCEDURE NURSING ASSESSMENT: Performed by: ORTHOPAEDIC SURGERY

## 2024-12-18 PROCEDURE — 27447 TOTAL KNEE ARTHROPLASTY: CPT | Mod: LT | Performed by: ORTHOPAEDIC SURGERY

## 2024-12-18 PROCEDURE — 250N000009 HC RX 250: Performed by: ORTHOPAEDIC SURGERY

## 2024-12-18 PROCEDURE — 250N000013 HC RX MED GY IP 250 OP 250 PS 637

## 2024-12-18 PROCEDURE — 250N000011 HC RX IP 250 OP 636: Performed by: ORTHOPAEDIC SURGERY

## 2024-12-18 PROCEDURE — 258N000003 HC RX IP 258 OP 636: Performed by: NURSE ANESTHETIST, CERTIFIED REGISTERED

## 2024-12-18 PROCEDURE — 258N000001 HC RX 258: Performed by: ORTHOPAEDIC SURGERY

## 2024-12-18 PROCEDURE — 120N000002 HC R&B MED SURG/OB UMMC

## 2024-12-18 PROCEDURE — 999N000065 XR KNEE PORT LEFT 1/2 VIEWS: Mod: LT

## 2024-12-18 PROCEDURE — 250N000013 HC RX MED GY IP 250 OP 250 PS 637: Performed by: ORTHOPAEDIC SURGERY

## 2024-12-18 PROCEDURE — 258N000003 HC RX IP 258 OP 636: Performed by: ORTHOPAEDIC SURGERY

## 2024-12-18 PROCEDURE — 272N000001 HC OR GENERAL SUPPLY STERILE: Performed by: ORTHOPAEDIC SURGERY

## 2024-12-18 PROCEDURE — 370N000017 HC ANESTHESIA TECHNICAL FEE, PER MIN: Performed by: ORTHOPAEDIC SURGERY

## 2024-12-18 PROCEDURE — C1776 JOINT DEVICE (IMPLANTABLE): HCPCS | Performed by: ORTHOPAEDIC SURGERY

## 2024-12-18 PROCEDURE — 710N000010 HC RECOVERY PHASE 1, LEVEL 2, PER MIN: Performed by: ORTHOPAEDIC SURGERY

## 2024-12-18 PROCEDURE — 250N000011 HC RX IP 250 OP 636: Mod: JW | Performed by: ANESTHESIOLOGY

## 2024-12-18 PROCEDURE — 250N000009 HC RX 250: Performed by: NURSE ANESTHETIST, CERTIFIED REGISTERED

## 2024-12-18 PROCEDURE — 360N000077 HC SURGERY LEVEL 4, PER MIN: Performed by: ORTHOPAEDIC SURGERY

## 2024-12-18 PROCEDURE — 250N000012 HC RX MED GY IP 250 OP 636 PS 637

## 2024-12-18 DEVICE — TOBRA FULL DOSE ANTIBIOTIC BONE CEMENT, 10 PACK CATALOG NUMBER IS 6197-9-010
Type: IMPLANTABLE DEVICE | Site: KNEE | Status: FUNCTIONAL
Brand: SIMPLEX

## 2024-12-18 DEVICE — GENESIS II NON-POROUS TIBIAL                                    BASEPLATE SIZE 2 LEFT
Type: IMPLANTABLE DEVICE | Site: KNEE | Status: FUNCTIONAL
Brand: GENESIS II

## 2024-12-18 DEVICE — LEGION CRUCIATE RETAINING                                    NONPOROUS FEMORAL SIZE 3 LEFT
Type: IMPLANTABLE DEVICE | Site: KNEE | Status: FUNCTIONAL
Brand: LEGION

## 2024-12-18 DEVICE — LEGION CRUCIATE RETAINING HIGH                                    FLEX HIGHLY CROSS LINKED                                    POLYETHYLENE SIZE 1-2 9MM
Type: IMPLANTABLE DEVICE | Site: KNEE | Status: FUNCTIONAL
Brand: LEGION

## 2024-12-18 RX ORDER — TRAZODONE HYDROCHLORIDE 50 MG/1
50 TABLET, FILM COATED ORAL AT BEDTIME
Status: DISCONTINUED | OUTPATIENT
Start: 2024-12-18 | End: 2024-12-19 | Stop reason: HOSPADM

## 2024-12-18 RX ORDER — NALOXONE HYDROCHLORIDE 0.4 MG/ML
0.4 INJECTION, SOLUTION INTRAMUSCULAR; INTRAVENOUS; SUBCUTANEOUS
Status: DISCONTINUED | OUTPATIENT
Start: 2024-12-18 | End: 2024-12-19 | Stop reason: HOSPADM

## 2024-12-18 RX ORDER — NALOXONE HYDROCHLORIDE 0.4 MG/ML
0.2 INJECTION, SOLUTION INTRAMUSCULAR; INTRAVENOUS; SUBCUTANEOUS
Status: DISCONTINUED | OUTPATIENT
Start: 2024-12-18 | End: 2024-12-19 | Stop reason: HOSPADM

## 2024-12-18 RX ORDER — OXYCODONE HYDROCHLORIDE 10 MG/1
10 TABLET ORAL EVERY 4 HOURS PRN
Status: DISCONTINUED | OUTPATIENT
Start: 2024-12-18 | End: 2024-12-19 | Stop reason: HOSPADM

## 2024-12-18 RX ORDER — CARBOXYMETHYLCELLULOSE SODIUM 5 MG/ML
1 SOLUTION/ DROPS OPHTHALMIC 2 TIMES DAILY PRN
Status: DISCONTINUED | OUTPATIENT
Start: 2024-12-18 | End: 2024-12-18

## 2024-12-18 RX ORDER — MIRTAZAPINE 7.5 MG/1
7.5 TABLET, FILM COATED ORAL AT BEDTIME
Status: DISCONTINUED | OUTPATIENT
Start: 2024-12-18 | End: 2024-12-19 | Stop reason: HOSPADM

## 2024-12-18 RX ORDER — BUPROPION HYDROCHLORIDE 100 MG/1
200 TABLET, EXTENDED RELEASE ORAL
Status: DISCONTINUED | OUTPATIENT
Start: 2024-12-19 | End: 2024-12-19 | Stop reason: HOSPADM

## 2024-12-18 RX ORDER — CEFAZOLIN SODIUM 2 G/100ML
2 INJECTION, SOLUTION INTRAVENOUS EVERY 8 HOURS
Status: COMPLETED | OUTPATIENT
Start: 2024-12-18 | End: 2024-12-19

## 2024-12-18 RX ORDER — AMOXICILLIN 250 MG
1 CAPSULE ORAL 2 TIMES DAILY
Status: DISCONTINUED | OUTPATIENT
Start: 2024-12-18 | End: 2024-12-19 | Stop reason: HOSPADM

## 2024-12-18 RX ORDER — NICOTINE POLACRILEX 4 MG
15-30 LOZENGE BUCCAL
Status: DISCONTINUED | OUTPATIENT
Start: 2024-12-18 | End: 2024-12-19 | Stop reason: HOSPADM

## 2024-12-18 RX ORDER — LIDOCAINE HYDROCHLORIDE 20 MG/ML
INJECTION, SOLUTION INFILTRATION; PERINEURAL PRN
Status: DISCONTINUED | OUTPATIENT
Start: 2024-12-18 | End: 2024-12-18

## 2024-12-18 RX ORDER — ONDANSETRON 4 MG/1
4 TABLET, ORALLY DISINTEGRATING ORAL EVERY 6 HOURS PRN
Status: DISCONTINUED | OUTPATIENT
Start: 2024-12-18 | End: 2024-12-19 | Stop reason: HOSPADM

## 2024-12-18 RX ORDER — BISACODYL 10 MG
10 SUPPOSITORY, RECTAL RECTAL DAILY PRN
Status: DISCONTINUED | OUTPATIENT
Start: 2024-12-18 | End: 2024-12-19 | Stop reason: HOSPADM

## 2024-12-18 RX ORDER — VITAMIN B COMPLEX
25 TABLET ORAL DAILY
Status: DISCONTINUED | OUTPATIENT
Start: 2024-12-18 | End: 2024-12-19 | Stop reason: HOSPADM

## 2024-12-18 RX ORDER — CEFAZOLIN SODIUM/WATER 2 G/20 ML
2 SYRINGE (ML) INTRAVENOUS EVERY 8 HOURS
Status: DISCONTINUED | OUTPATIENT
Start: 2024-12-18 | End: 2024-12-18

## 2024-12-18 RX ORDER — ONDANSETRON 2 MG/ML
4 INJECTION INTRAMUSCULAR; INTRAVENOUS EVERY 6 HOURS PRN
Status: DISCONTINUED | OUTPATIENT
Start: 2024-12-18 | End: 2024-12-19 | Stop reason: HOSPADM

## 2024-12-18 RX ORDER — METFORMIN HYDROCHLORIDE 500 MG/1
1000 TABLET, EXTENDED RELEASE ORAL 2 TIMES DAILY WITH MEALS
Status: DISCONTINUED | OUTPATIENT
Start: 2024-12-18 | End: 2024-12-19 | Stop reason: HOSPADM

## 2024-12-18 RX ORDER — METHOCARBAMOL 500 MG/1
500 TABLET, FILM COATED ORAL 3 TIMES DAILY PRN
Status: DISCONTINUED | OUTPATIENT
Start: 2024-12-18 | End: 2024-12-19 | Stop reason: HOSPADM

## 2024-12-18 RX ORDER — ONDANSETRON 2 MG/ML
INJECTION INTRAMUSCULAR; INTRAVENOUS PRN
Status: DISCONTINUED | OUTPATIENT
Start: 2024-12-18 | End: 2024-12-18

## 2024-12-18 RX ORDER — PANTOPRAZOLE SODIUM 40 MG/1
40 TABLET, DELAYED RELEASE ORAL
Status: DISCONTINUED | OUTPATIENT
Start: 2024-12-19 | End: 2024-12-19 | Stop reason: HOSPADM

## 2024-12-18 RX ORDER — PROPOFOL 10 MG/ML
INJECTION, EMULSION INTRAVENOUS PRN
Status: DISCONTINUED | OUTPATIENT
Start: 2024-12-18 | End: 2024-12-18

## 2024-12-18 RX ORDER — MULTIPLE VITAMINS W/ MINERALS TAB 9MG-400MCG
1 TAB ORAL DAILY
Status: DISCONTINUED | OUTPATIENT
Start: 2024-12-18 | End: 2024-12-19 | Stop reason: HOSPADM

## 2024-12-18 RX ORDER — ASPIRIN 81 MG/1
81 TABLET ORAL 2 TIMES DAILY
Qty: 60 TABLET | Refills: 0 | Status: SHIPPED | OUTPATIENT
Start: 2024-12-18

## 2024-12-18 RX ORDER — ACETAMINOPHEN 325 MG/1
975 TABLET ORAL EVERY 8 HOURS
Status: DISCONTINUED | OUTPATIENT
Start: 2024-12-18 | End: 2024-12-19 | Stop reason: HOSPADM

## 2024-12-18 RX ORDER — HYDROMORPHONE HYDROCHLORIDE 1 MG/ML
0.2 INJECTION, SOLUTION INTRAMUSCULAR; INTRAVENOUS; SUBCUTANEOUS
Status: DISCONTINUED | OUTPATIENT
Start: 2024-12-18 | End: 2024-12-19 | Stop reason: HOSPADM

## 2024-12-18 RX ORDER — MULTIVITAMIN WITH FOLIC ACID 400 MCG
1 TABLET ORAL DAILY
Status: DISCONTINUED | OUTPATIENT
Start: 2024-12-18 | End: 2024-12-18

## 2024-12-18 RX ORDER — SODIUM CHLORIDE, SODIUM LACTATE, POTASSIUM CHLORIDE, CALCIUM CHLORIDE 600; 310; 30; 20 MG/100ML; MG/100ML; MG/100ML; MG/100ML
INJECTION, SOLUTION INTRAVENOUS CONTINUOUS
Status: DISCONTINUED | OUTPATIENT
Start: 2024-12-18 | End: 2024-12-19 | Stop reason: HOSPADM

## 2024-12-18 RX ORDER — FERROUS GLUCONATE 324(38)MG
324 TABLET ORAL
Status: DISCONTINUED | OUTPATIENT
Start: 2024-12-19 | End: 2024-12-19 | Stop reason: HOSPADM

## 2024-12-18 RX ORDER — SODIUM CHLORIDE, SODIUM LACTATE, POTASSIUM CHLORIDE, CALCIUM CHLORIDE 600; 310; 30; 20 MG/100ML; MG/100ML; MG/100ML; MG/100ML
INJECTION, SOLUTION INTRAVENOUS CONTINUOUS PRN
Status: DISCONTINUED | OUTPATIENT
Start: 2024-12-18 | End: 2024-12-18

## 2024-12-18 RX ORDER — ASPIRIN 81 MG/1
81 TABLET ORAL 2 TIMES DAILY
Status: CANCELLED | OUTPATIENT
Start: 2024-12-18

## 2024-12-18 RX ORDER — MAGNESIUM HYDROXIDE 1200 MG/15ML
LIQUID ORAL PRN
Status: DISCONTINUED | OUTPATIENT
Start: 2024-12-18 | End: 2024-12-18

## 2024-12-18 RX ORDER — CEFAZOLIN SODIUM/WATER 2 G/20 ML
2 SYRINGE (ML) INTRAVENOUS
Status: COMPLETED | OUTPATIENT
Start: 2024-12-18 | End: 2024-12-18

## 2024-12-18 RX ORDER — CEFAZOLIN SODIUM/WATER 2 G/20 ML
2 SYRINGE (ML) INTRAVENOUS SEE ADMIN INSTRUCTIONS
Status: DISCONTINUED | OUTPATIENT
Start: 2024-12-18 | End: 2024-12-18 | Stop reason: HOSPADM

## 2024-12-18 RX ORDER — BUPIVACAINE HYDROCHLORIDE 7.5 MG/ML
INJECTION, SOLUTION INTRASPINAL
Status: COMPLETED | OUTPATIENT
Start: 2024-12-18 | End: 2024-12-18

## 2024-12-18 RX ORDER — CARBOXYMETHYLCELLULOSE SODIUM 5 MG/ML
1 SOLUTION/ DROPS OPHTHALMIC 2 TIMES DAILY PRN
Status: DISCONTINUED | OUTPATIENT
Start: 2024-12-18 | End: 2024-12-19 | Stop reason: HOSPADM

## 2024-12-18 RX ORDER — TRANEXAMIC ACID 650 MG/1
1950 TABLET ORAL ONCE
Status: COMPLETED | OUTPATIENT
Start: 2024-12-18 | End: 2024-12-18

## 2024-12-18 RX ORDER — SIMVASTATIN 20 MG
20 TABLET ORAL AT BEDTIME
Status: DISCONTINUED | OUTPATIENT
Start: 2024-12-18 | End: 2024-12-19 | Stop reason: HOSPADM

## 2024-12-18 RX ORDER — LIDOCAINE 40 MG/G
CREAM TOPICAL
Status: DISCONTINUED | OUTPATIENT
Start: 2024-12-18 | End: 2024-12-19 | Stop reason: HOSPADM

## 2024-12-18 RX ORDER — ACETAMINOPHEN 325 MG/1
650 TABLET ORAL EVERY 4 HOURS PRN
Qty: 100 TABLET | Refills: 0 | Status: SHIPPED | OUTPATIENT
Start: 2024-12-18

## 2024-12-18 RX ORDER — OXYCODONE HYDROCHLORIDE 5 MG/1
5 TABLET ORAL EVERY 4 HOURS PRN
Status: DISCONTINUED | OUTPATIENT
Start: 2024-12-18 | End: 2024-12-19 | Stop reason: HOSPADM

## 2024-12-18 RX ORDER — HYDROMORPHONE HYDROCHLORIDE 1 MG/ML
0.4 INJECTION, SOLUTION INTRAMUSCULAR; INTRAVENOUS; SUBCUTANEOUS
Status: DISCONTINUED | OUTPATIENT
Start: 2024-12-18 | End: 2024-12-19 | Stop reason: HOSPADM

## 2024-12-18 RX ORDER — PROPOFOL 10 MG/ML
INJECTION, EMULSION INTRAVENOUS CONTINUOUS PRN
Status: DISCONTINUED | OUTPATIENT
Start: 2024-12-18 | End: 2024-12-18

## 2024-12-18 RX ORDER — ROPINIROLE 0.25 MG/1
0.25 TABLET, FILM COATED ORAL EVERY EVENING
Status: DISCONTINUED | OUTPATIENT
Start: 2024-12-18 | End: 2024-12-19 | Stop reason: HOSPADM

## 2024-12-18 RX ORDER — DEXTROSE MONOHYDRATE 25 G/50ML
25-50 INJECTION, SOLUTION INTRAVENOUS
Status: DISCONTINUED | OUTPATIENT
Start: 2024-12-18 | End: 2024-12-19 | Stop reason: HOSPADM

## 2024-12-18 RX ORDER — POLYETHYLENE GLYCOL 3350 17 G/17G
17 POWDER, FOR SOLUTION ORAL DAILY
Status: DISCONTINUED | OUTPATIENT
Start: 2024-12-19 | End: 2024-12-19 | Stop reason: HOSPADM

## 2024-12-18 RX ORDER — LISINOPRIL 20 MG/1
20 TABLET ORAL DAILY
Status: DISCONTINUED | OUTPATIENT
Start: 2024-12-18 | End: 2024-12-19 | Stop reason: HOSPADM

## 2024-12-18 RX ORDER — PROPRANOLOL HYDROCHLORIDE 80 MG/1
80 CAPSULE, EXTENDED RELEASE ORAL DAILY
Status: DISCONTINUED | OUTPATIENT
Start: 2024-12-19 | End: 2024-12-19 | Stop reason: HOSPADM

## 2024-12-18 RX ORDER — GABAPENTIN 600 MG/1
1200 TABLET ORAL 3 TIMES DAILY
Status: DISCONTINUED | OUTPATIENT
Start: 2024-12-18 | End: 2024-12-19 | Stop reason: HOSPADM

## 2024-12-18 RX ORDER — BUSPIRONE HYDROCHLORIDE 10 MG/1
30 TABLET ORAL 2 TIMES DAILY
Status: DISCONTINUED | OUTPATIENT
Start: 2024-12-18 | End: 2024-12-19 | Stop reason: HOSPADM

## 2024-12-18 RX ORDER — PIOGLITAZONE 45 MG/1
45 TABLET ORAL DAILY
Status: DISCONTINUED | OUTPATIENT
Start: 2024-12-18 | End: 2024-12-19 | Stop reason: HOSPADM

## 2024-12-18 RX ORDER — AMOXICILLIN 250 MG
1-2 CAPSULE ORAL 2 TIMES DAILY PRN
Qty: 30 TABLET | Refills: 0 | Status: SHIPPED | OUTPATIENT
Start: 2024-12-18

## 2024-12-18 RX ADMIN — PHENYLEPHRINE HYDROCHLORIDE 100 MCG: 10 INJECTION INTRAVENOUS at 14:00

## 2024-12-18 RX ADMIN — LIDOCAINE HYDROCHLORIDE 40 MG: 20 INJECTION, SOLUTION INFILTRATION; PERINEURAL at 13:50

## 2024-12-18 RX ADMIN — ONDANSETRON 4 MG: 2 INJECTION INTRAMUSCULAR; INTRAVENOUS at 13:48

## 2024-12-18 RX ADMIN — PROPOFOL 10 MG: 10 INJECTION, EMULSION INTRAVENOUS at 15:03

## 2024-12-18 RX ADMIN — Medication 25 MCG: at 18:34

## 2024-12-18 RX ADMIN — BUSPIRONE HYDROCHLORIDE 30 MG: 10 TABLET ORAL at 19:18

## 2024-12-18 RX ADMIN — Medication 2 G: at 13:43

## 2024-12-18 RX ADMIN — SODIUM CHLORIDE, POTASSIUM CHLORIDE, SODIUM LACTATE AND CALCIUM CHLORIDE: 600; 310; 30; 20 INJECTION, SOLUTION INTRAVENOUS at 17:30

## 2024-12-18 RX ADMIN — DEXMEDETOMIDINE HYDROCHLORIDE 4 MCG: 100 INJECTION, SOLUTION INTRAVENOUS at 14:28

## 2024-12-18 RX ADMIN — SIMVASTATIN 20 MG: 20 TABLET, FILM COATED ORAL at 21:58

## 2024-12-18 RX ADMIN — PROPOFOL 20 MG: 10 INJECTION, EMULSION INTRAVENOUS at 14:28

## 2024-12-18 RX ADMIN — DEXMEDETOMIDINE HYDROCHLORIDE 4 MCG: 100 INJECTION, SOLUTION INTRAVENOUS at 14:21

## 2024-12-18 RX ADMIN — CEFAZOLIN SODIUM 2 G: 2 INJECTION, SOLUTION INTRAVENOUS at 21:05

## 2024-12-18 RX ADMIN — PHENYLEPHRINE HYDROCHLORIDE 100 MCG: 10 INJECTION INTRAVENOUS at 14:35

## 2024-12-18 RX ADMIN — PHENYLEPHRINE HYDROCHLORIDE 100 MCG: 10 INJECTION INTRAVENOUS at 13:53

## 2024-12-18 RX ADMIN — ROPINIROLE HYDROCHLORIDE 0.25 MG: 0.25 TABLET, FILM COATED ORAL at 19:18

## 2024-12-18 RX ADMIN — CARIPRAZINE 4.5 MG: 1.5 CAPSULE, GELATIN COATED ORAL at 18:34

## 2024-12-18 RX ADMIN — MIRTAZAPINE 7.5 MG: 7.5 TABLET, FILM COATED ORAL at 21:57

## 2024-12-18 RX ADMIN — Medication 1 TABLET: at 18:34

## 2024-12-18 RX ADMIN — DEXMEDETOMIDINE HYDROCHLORIDE 4 MCG: 100 INJECTION, SOLUTION INTRAVENOUS at 14:12

## 2024-12-18 RX ADMIN — PHENYLEPHRINE HYDROCHLORIDE 100 MCG: 10 INJECTION INTRAVENOUS at 14:13

## 2024-12-18 RX ADMIN — INSULIN GLARGINE 15 UNITS: 100 INJECTION, SOLUTION SUBCUTANEOUS at 21:57

## 2024-12-18 RX ADMIN — TRANEXAMIC ACID 1950 MG: 650 TABLET ORAL at 11:14

## 2024-12-18 RX ADMIN — PROPOFOL 10 MG: 10 INJECTION, EMULSION INTRAVENOUS at 14:57

## 2024-12-18 RX ADMIN — SODIUM CHLORIDE, POTASSIUM CHLORIDE, SODIUM LACTATE AND CALCIUM CHLORIDE: 600; 310; 30; 20 INJECTION, SOLUTION INTRAVENOUS at 13:38

## 2024-12-18 RX ADMIN — PROPOFOL 100 MCG/KG/MIN: 10 INJECTION, EMULSION INTRAVENOUS at 13:50

## 2024-12-18 RX ADMIN — PROPOFOL 50 MG: 10 INJECTION, EMULSION INTRAVENOUS at 13:50

## 2024-12-18 RX ADMIN — PHENYLEPHRINE HYDROCHLORIDE 100 MCG: 10 INJECTION INTRAVENOUS at 14:38

## 2024-12-18 RX ADMIN — DEXMEDETOMIDINE HYDROCHLORIDE 4 MCG: 100 INJECTION, SOLUTION INTRAVENOUS at 14:09

## 2024-12-18 RX ADMIN — METHOCARBAMOL 500 MG: 500 TABLET ORAL at 22:32

## 2024-12-18 RX ADMIN — TRAZODONE HYDROCHLORIDE 50 MG: 50 TABLET ORAL at 21:57

## 2024-12-18 RX ADMIN — SENNOSIDES AND DOCUSATE SODIUM 1 TABLET: 50; 8.6 TABLET ORAL at 19:18

## 2024-12-18 RX ADMIN — BUPIVACAINE HYDROCHLORIDE IN DEXTROSE 1.6 ML: 7.5 INJECTION, SOLUTION SUBARACHNOID at 13:35

## 2024-12-18 RX ADMIN — GABAPENTIN 1200 MG: 600 TABLET, FILM COATED ORAL at 19:18

## 2024-12-18 RX ADMIN — ACETAMINOPHEN 975 MG: 325 TABLET, FILM COATED ORAL at 17:30

## 2024-12-18 RX ADMIN — OXYCODONE HYDROCHLORIDE 5 MG: 5 TABLET ORAL at 23:01

## 2024-12-18 RX ADMIN — DEXMEDETOMIDINE HYDROCHLORIDE 4 MCG: 100 INJECTION, SOLUTION INTRAVENOUS at 14:08

## 2024-12-18 RX ADMIN — PIOGLITAZONE HYDROCHLORIDE 45 MG: 45 TABLET ORAL at 18:34

## 2024-12-18 ASSESSMENT — ACTIVITIES OF DAILY LIVING (ADL)
ADLS_ACUITY_SCORE: 49
ADLS_ACUITY_SCORE: 56
ADLS_ACUITY_SCORE: 56
ADLS_ACUITY_SCORE: 47
ADLS_ACUITY_SCORE: 56
ADLS_ACUITY_SCORE: 49
ADLS_ACUITY_SCORE: 58
ADLS_ACUITY_SCORE: 47
ADLS_ACUITY_SCORE: 58
ADLS_ACUITY_SCORE: 49
ADLS_ACUITY_SCORE: 47

## 2024-12-18 NOTE — ANESTHESIA PROCEDURE NOTES
"Intrathecal injection Procedure Note    Pre-Procedure   Staff -        Anesthesiologist:  Aletha Hale MD       Performed By: anesthesiologist       Location: OR       Procedure Start/Stop Times: 12/18/2024 1:35 PM and 12/18/2024 1:51 PM       Pre-Anesthestic Checklist: patient identified, IV checked, risks and benefits discussed, informed consent, monitors and equipment checked, pre-op evaluation, at physician/surgeon's request and post-op pain management  Timeout:       Correct Patient: Yes        Correct Procedure: Yes        Correct Site: Yes        Correct Position: Yes   Procedure Documentation  Procedure: intrathecal injection       Patient Position: sitting       Skin prep: Betadine       Insertion Site: L3-4. (midline approach).       Needle Gauge: 25.        Needle Length (Inches): 3.5        Spinal Needle Type: Pencan       Introducer used       # of attempts: 1 and  # of redirects:  0    Assessment/Narrative         Paresthesias: No.       CSF fluid: clear.       Opening pressure was cmH2O while  Sitting.      Medication(s) Administered   0.75% Hyperbaric Bupivacaine (Intrathecal) - Intrathecal   1.6 mL - 12/18/2024 1:35:00 PM  Medication Administration Time: 12/18/2024 1:35 PM     Comments:  Easily placed, no acute complications      FOR [facility] ([facility]) ONLY:   Pain Team Contact information: please page the Pain Team Via [employer]. Search \"Pain\". During daytime hours, please page the attending first. At night please page the resident first.      "

## 2024-12-18 NOTE — OR NURSING
"PACU to Inpatient Nursing Handoff    Patient Holly Muir is a 69 year old female who speaks English.   Procedure Procedure(s):  ARTHROPLASTY, KNEE, TOTAL   Surgeon(s) Primary: Sanford Sheehan MD  Assisting: Shayy cSott PA-C     Allergies   Allergen Reactions    Morphine Sulfate Itching       Isolation  [unfilled]     Past Medical History   has a past medical history of Bilateral knee pain, Cataracts, both eyes (05/08/2013), Cervical cancer (H), Chronic kidney disease, stage 3a (H) (11/17/2021), Depressive disorder (2005?), Diabetes, Diabetic retinopathy (H), History of blood transfusion (2000), HTN, Inflammatory arthritis, Major depression, Memory loss, Nephropathy, OA (osteoarthritis) of knee (09/11/2013), Other and unspecified hyperlipidemia, Pterygium eye, and Sacral nerve root injury.    Anesthesia Spinal   Dermatome Level     Preop Meds TXA - time given: 1114   Nerve block Not applicable   Intraop Meds dexmedetomidine (Precedex): 1421 mcg total  ondansetron (Zofran): last given at 1348  tera   Local Meds Yes - cocktail without toradol   Antibiotics cefazolin (Ancef) - last given at 1343     Pain  Denies   PACU meds  Not applicable   PCA / epidural No   Capnography     Telemetry     Inpatient Telemetry Monitor Ordered? No        Labs Glucose Lab Results   Component Value Date     12/18/2024    GLC 78 01/19/2023     08/24/2020       Hgb Lab Results   Component Value Date    HGB 11.0 12/02/2024    HGB 11.0 10/14/2020       INR No results found for: \"INR\"   PACU Imaging Completed     Wound/Incision Incision/Surgical Site 12/18/24 Anterior;Left Knee (Active)   Incision Assessment UTV 12/18/24 1534   Dressing Other (Comment) 12/18/24 1521   Yesenia-Incision Assessment UTV 12/18/24 1521   Closure BHARTI 12/18/24 1534   Incision Drainage Amount UTV 12/18/24 1521   Drainage Description UTV 12/18/24 1521   Incision Care Ice applied 12/18/24 1534   Dressing Intervention Clean, dry, intact 12/18/24 " 1534   Number of days: 0      CMS        Equipment ice pack   Other LDA       IV Access Peripheral IV 12/18/24 Right;Posterior Hand (Active)   Site Assessment WDL 12/18/24 1534   Line Status Infusing 12/18/24 1534   Dressing Transparent 12/18/24 1534   Dressing Status clean;dry;intact 12/18/24 1534   Dressing Intervention New dressing  12/18/24 1154   Phlebitis Scale 0-->no symptoms 12/18/24 1534   Infiltration? no 12/18/24 1534   Number of days: 0      Blood Products Not applicable  mL   Intake/Output Date 12/18/24 0700 - 12/19/24 0659   Shift 7719-6807 8676-4073 1698-9415 24 Hour Total   INTAKE   I.V.  800  800   Shift Total(mL/kg)  800(9.56)  800(9.56)   OUTPUT   Blood  100  100   Shift Total(mL/kg)  100(1.19)  100(1.19)   Weight (kg) 83.7 83.7 83.7 83.7      Drains / Bhatt Colostomy (Active)   Number of days:       Time of void PreOp Time of Void Prior to Procedure: 1100 (12/18/24 1108)    PostOp      Diapered? No   Bladder Scan Bladder Scan Volume (mL): 220 ml (12/18/24 1559)   PO    tolerating sips, crackers, and water     Vitals    B/P: 94/78  T: 98.2  F (36.8  C)    Temp src: Oral  P:  Pulse: 73 (12/18/24 1545)          R: 18  O2:  SpO2: 99 %    O2 Device: None (Room air) (12/18/24 1545)    Oxygen Delivery: 6 LPM (12/18/24 1534)         Family/support present None present   Patient belongings     Patient transported on bed   DC meds/scripts (obs/outpt) Not applicable   Inpatient Pain Meds Released? Yes       Special needs/considerations Pt has colostomy   Tasks needing completion None       Yessi Gray, RN  [facility]

## 2024-12-18 NOTE — ANESTHESIA CARE TRANSFER NOTE
Patient: Holly Muir    Procedure: Procedure(s):  ARTHROPLASTY, KNEE, TOTAL       Diagnosis: Primary localized osteoarthritis of left knee [M17.12]  Diagnosis Additional Information: No value filed.    Anesthesia Type:   Spinal     Note:      Level of Consciousness: awake  Oxygen Supplementation: face mask  Level of Supplemental Oxygen (L/min / FiO2): 6  Independent Airway: airway patency satisfactory and stable  Dentition: dentition unchanged  Vital Signs Stable: post-procedure vital signs reviewed and stable  Report to RN Given: handoff report given  Patient transferred to: PACU  Comments: No complaints; sensory level T12; unable to move toes yet; care to PACU RN  Handoff Report: Identifed the Patient, Identified the Reponsible Provider, Reviewed the pertinent medical history, Discussed the surgical course, Reviewed Intra-OP anesthesia mangement and issues during anesthesia, Set expectations for post-procedure period and Allowed opportunity for questions and acknowledgement of understanding      Vitals:  Vitals Value Taken Time   /63 12/18/24 1542   Temp 36.8    Pulse 75 12/18/24 1545   Resp 18 12/18/24 1545   SpO2 98 % 12/18/24 1545   Vitals shown include unfiled device data.    Electronically Signed By: AMBER PIZARRO APRN CRNA  December 18, 2024  3:46 PM

## 2024-12-18 NOTE — PHARMACY-ADMISSION MEDICATION HISTORY
Pharmacist Admission Medication History    Admission medication history is complete. The information provided in this note is only as accurate as the sources available at the time of the update.    Information Source(s): Hospital records, CareEverywhere/SureScripts, and nursing completed medication history on admission  via N/A    Changes made to PTA medication list:  Added: None  Deleted: -Triamcinolone injection  Changed: None    Allergies reviewed with patient and updates made in EHR:  assessed by nursing on admission    Medication History Completed By: Bharat Esqueda PharmD 12/18/2024 5:47 PM    PTA Med List   Medication Sig Note Last Dose/Taking    acetaminophen (TYLENOL) 325 MG tablet Take 2 tablets (650 mg) by mouth every 4 hours as needed for other (mild pain).  Taking As Needed    acetaminophen (TYLENOL) 325 MG tablet Take 2 tablets (650 mg) by mouth every 4 hours as needed for other (For optimal non-opioid multimodal pain management to improve pain control.)  Past Month    aspirin (ASPIRIN LOW DOSE) 81 MG chewable tablet TAKE 1 TABLET BY MOUTH EVERY DAY  12/9/2024    aspirin 81 MG EC tablet Take 1 tablet (81 mg) by mouth 2 times daily.  Taking    buPROPion (WELLBUTRIN SR) 200 MG 12 hr tablet Take 1 tablet (200 mg) by mouth daily (with breakfast).  12/17/2024    busPIRone HCl (BUSPAR) 30 MG tablet TAKE 1 TABLET BY MOUTH TWICE A DAY  12/17/2024    carboxymethylcellulose (REFRESH PLUS) 0.5 % SOLN ophthalmic solution PLACE 1 DROP INTO BOTH EYES 2 TIMES DAILY AS NEEDED FOR DRY EYES.  12/17/2024    cariprazine (VRAYLAR) 4.5 MG capsule Take 1 capsule (4.5 mg) by mouth daily.  12/17/2024    cetirizine (ZYRTEC) 10 MG tablet TAKE 1 TABLET (10 MG) BY MOUTH DAILY. 12/16/2024: Hold dos Past Month    gabapentin (NEURONTIN) 600 MG tablet TAKE 2 TABLETS (1,200 MG) BY MOUTH 3 TIMES DAILY FOR 30 DAYS  12/18/2024    insulin glargine (LANTUS SOLOSTAR) 100 UNIT/ML pen INJECT 20 UNITS AT BEDTIME AS DIRECTED + 2 UNITS FOR  PRIMING OF PEN. 12/16/2024: 16/20 evening before 12/17/2024    metFORMIN (GLUCOPHAGE XR) 500 MG 24 hr tablet Take 2 tablets (1,000 mg) by mouth 2 times daily (with meals) Take two tablets with breakfast and one tablet with dinner for one week then increase to two tablets with breakfast and two tablets with dinner thereafter  12/17/2024    mirtazapine (REMERON) 7.5 MG tablet Take 1 tablet (7.5 mg) by mouth at bedtime.  12/17/2024    MOUNJARO 12.5 MG/0.5ML SOAJ Inject 0.5 mLs (12.5 mg) subcutaneously every 7 days. 12/16/2024: On hold for surgery Past Week    Multiple Vitamin (DAILY-ISAAC MULTIVITAMIN) TABS TAKE 1 TABLET BY MOUTH EVERY DAY  12/17/2024    omeprazole (PRILOSEC) 20 MG DR capsule TAKE 2 CAPSULES (40 MG) BY MOUTH DAILY *TAKE 30-60 MINUTES BEFORE A MEAL*  12/17/2024    ondansetron (ZOFRAN ODT) 4 MG ODT tab Place 4 mg under the tongue.  More than a month    oxyCODONE-acetaminophen (PERCOCET) 5-325 MG tablet Take 1 tablet by mouth every 6 hours as needed for pain.  12/17/2024    PARoxetine (PAXIL) 40 MG tablet Take 1 tablet (40 mg) by mouth every evening.  12/17/2024    pioglitazone (ACTOS) 45 MG tablet Take 1 tablet (45 mg) by mouth daily  12/17/2024    propranolol ER (INDERAL LA) 80 MG 24 hr capsule TAKE 1 CAPSULE BY MOUTH EVERY DAY  12/18/2024    ramipril (ALTACE) 5 MG capsule TAKE 1 CAPSULE BY MOUTH EVERY DAY 12/16/2024: Hold dos 12/17/2024    rOPINIRole (REQUIP) 0.25 MG tablet TAKE 1 TABLET (0.25 MG) BY MOUTH EVERY EVENING  12/17/2024    senna-docusate (SENOKOT-S/PERICOLACE) 8.6-50 MG tablet Take 1-2 tablets by mouth 2 times daily as needed for constipation. Take while on oral narcotics to prevent or treat constipation.  Taking As Needed    simvastatin (ZOCOR) 20 MG tablet TAKE 1 TABLET BY MOUTH EVERYDAY AT BEDTIME  12/17/2024    traZODone (DESYREL) 50 MG tablet Take 50 mg by mouth.  12/17/2024    vitamin C ([employer] VITAMIN C) 500 MG tablet 1/2 TABLET BY MOUTH INDIRA WITH IRON SUPPLEMENT BEFORE MEAL  Past Week     VITAMIN D3 25 MCG (1000 UT) tablet TAKE 1 TABLET BY MOUTH EVERY DAY  Past Week    [DISCONTINUED] oxyCODONE-acetaminophen (PERCOCET) 5-325 MG tablet Take 1 tablet by mouth every 6 hours as needed for pain.  12/16/2024

## 2024-12-18 NOTE — ANESTHESIA PREPROCEDURE EVALUATION
Anesthesia Pre-Procedure Evaluation    Patient: Holly Muir   MRN: [id number] : 1955        Procedure : Procedure(s):  ARTHROPLASTY, KNEE, TOTAL          Past Medical History:   Diagnosis Date    Bilateral knee pain     Cataracts, both eyes 2013    Cervical cancer (H)     Chronic kidney disease, stage 3a (H) 2021    Depressive disorder ?    Diabetes     Diabetic retinopathy (H)     History of blood transfusion     HTN     Inflammatory arthritis     Major depression     Memory loss     Nephropathy     OA (osteoarthritis) of knee 2013    Other and unspecified hyperlipidemia     Pterygium eye     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      Past Surgical History:   Procedure Laterality Date    ARTHROPLASTY KNEE Right 10/02/2023    Procedure: ARTHROPLASTY, RIGHT KNEE, TOTAL;  Surgeon: Sanford Sheehan MD;  Location: [facility] OR    ARTHROSCOPY KNEE RT/LT      LT    BIOPSY  1975    Lumb onleft side of breast    BLEPHAROPLASTY BILATERAL Bilateral 2019    Procedure: BILATERAL UPPER LID BLEPHAROPLASTY;  Surgeon: Randolph Cook MD;  Location: [facility] OR    BREAST LUMPECTOMY, RT/LT      LT-benign     CATARACT IOL, RT/LT      COLONOSCOPY  05/10/2012    Procedure:COLONOSCOPY; screening colonoscopy; Surgeon:MILLA VEGA; Location:[facility] OR    COLONOSCOPY WITH CO2 INSUFFLATION N/A 2019    Procedure: Colonoscopy with CO2 insufflation;  Surgeon: Himanshu Hi MD;  Location: [facility] OR    COLOSTOMY  2000    COLOSTOMY      HYSTERECTOMY, PAP NO LONGER INDICATED      done in [place]-cervical cancer    IMPLANT STIMULATOR SACRAL NERVE STAGE ONE Right 03/10/2015    Procedure: IMPLANT STIMULATOR SACRAL NERVE STAGE ONE;  Surgeon: Teodora Yusuf MD;  [facility]: [facility] OR    IMPLANT STIMULATOR SACRAL NERVE STAGE TWO Right 2015    Procedure: IMPLANT STIMULATOR SACRAL NERVE STAGE TWO;  Surgeon: Teodora Yusuf MD;  Location: [facility] OR    IMPLANT STIMULATOR SACRAL NERVE STAGE TWO  Right 2020    Procedure: INSERTION, SACRAL NERVE STIMULATOR, STAGE 2 (replacement of interstim battery);  Surgeon: Teodora Yusuf MD;  Location: [facility] OR    INJECT EPIDURAL TRANSFORAMINAL Right 2023    Procedure: Right Lumbar 5-Sacral 1 Transforaminal Epidural Steroid Injection with fluoroscopic guidance and contrast;  Surgeon: Kulwinder Booth MD;  Location: [facility] OR    INJECT EPIDURAL TRANSFORAMINAL Right 2023    Procedure: Right Lumbar 5-Sacral 1 Transforaminal Epidural Steroid Injection with fluoroscopic guidance and contrast.;  Surgeon: Kulwinder Booth MD;  Location: [facility] OR    PHACOEMULSIFICATION WITH STANDARD INTRAOCULAR LENS IMPLANT Left 2019    Procedure: LEFT PHACOEMULSIFICATION, CATARACT, WITH STANDARD IOL INSERTION;  Surgeon: Francesco Winn MD;  Location: [facility]    PHACOEMULSIFICATION WITH STANDARD INTRAOCULAR LENS IMPLANT Right 10/24/2019    Procedure: RIGHT PHACOEMULSIFICATION, CATARACT, WITH STANDARD IOL INSERTION;  Surgeon: Francesco Winn MD;  Location: [facility] OR    REPAIR PTOSIS Bilateral 2019    SALPINGO OOPHORECTOMY,R/L/JENNIFER      Salpingo Oophorectomy, RT/LT/JENNIFER    ZZC EXCIS PTERYGIUM  10/2008    [facility]    Z STOMACH SURGERY PROCEDURE UNLISTED      ZZ COLONOSCOPY THRU STOMA, DIAGNOSTIC      normal per report-no records available-records destroyed      Allergies   Allergen Reactions    Morphine Sulfate Itching      Social History     Tobacco Use    Smoking status: Former     Current packs/day: 0.00     Types: Cigarettes     Quit date: 1974     Years since quittin.0     Passive exposure: Never    Smokeless tobacco: Never   Substance Use Topics    Alcohol use: Not Currently     Comment: social occasions      Wt Readings from Last 1 Encounters:   24 84.1 kg (185 lb 6.4 oz)        Anesthesia Evaluation   Pt has had prior anesthetic. Type: General.        ROS/MED HX  ENT/Pulmonary:     (+)                              recent URI,         "  Neurologic:       Cardiovascular:     (+)  hypertension- -   -  - -                                      METS/Exercise Tolerance:     Hematologic:     (+) History of blood clots,    pt is anticoagulated,           Musculoskeletal:       GI/Hepatic:     (+) GERD, Asymptomatic on medication,                  Renal/Genitourinary:     (+) renal disease, type: CRI,            Endo:     (+)  type II DM,   Using insulin,    Diabetic complications: nephropathy neuropathy retinopathy.      Obesity,       Psychiatric/Substance Use:     (+) psychiatric history 1       Infectious Disease:       Malignancy:       Other:            Physical Exam    Airway        Mallampati: II   TM distance: > 3 FB   Neck ROM: full   Mouth opening: > 3 cm    Respiratory Devices and Support         Dental  no notable dental history     (+) Modest Abnormalities - crowns, retainers, 1 or 2 missing teeth      Cardiovascular   cardiovascular exam normal       Rhythm and rate: regular and normal     Pulmonary   pulmonary exam normal        breath sounds clear to auscultation           OUTSIDE LABS:  CBC:   Lab Results   Component Value Date    WBC 7.2 12/02/2024    WBC 7.1 12/01/2023    HGB 11.0 (L) 12/02/2024    HGB 11.4 (L) 11/06/2024    HCT 33.8 (L) 12/02/2024    HCT 35.1 12/01/2023     12/02/2024     12/01/2023     BMP:   Lab Results   Component Value Date     12/07/2024     (L) 12/02/2024    POTASSIUM 5.2 12/07/2024    POTASSIUM 4.9 12/02/2024    CHLORIDE 99 12/07/2024    CHLORIDE 92 (L) 12/02/2024    CO2 25 12/07/2024    CO2 23 12/02/2024    BUN 17.8 12/07/2024    BUN 24.6 (H) 12/02/2024    CR 0.83 12/07/2024    CR 1.82 (H) 12/02/2024     (H) 12/07/2024     (H) 12/02/2024     COAGS: No results found for: \"PTT\", \"INR\", \"FIBR\"  POC:   Lab Results   Component Value Date     (H) 06/22/2020     HEPATIC:   Lab Results   Component Value Date    ALBUMIN 4.1 09/15/2023    PROTTOTAL 6.9 09/15/2023    ALT 13 " "09/15/2023    AST 18 09/15/2023    ALKPHOS 71 09/15/2023    BILITOTAL 0.2 09/15/2023     OTHER:   Lab Results   Component Value Date    LACT 1.3 10/02/2023    A1C 7.7 (H) 12/02/2024    NICOLÁS 9.9 12/07/2024    PHOS 4.8 (H) 02/03/2015    MAG 1.9 08/24/2020    TSH 1.44 08/24/2020    T4 1.08 08/08/2018       Anesthesia Plan    ASA Status:  3    NPO Status:  NPO Appropriate    Anesthesia Type: Spinal (General if spinal difficult or fails).   Induction: Intravenous, Propofol.   Maintenance: Balanced.        Consents    Anesthesia Plan(s) and associated risks, benefits, and realistic alternatives discussed. Questions answered and patient/representative(s) expressed understanding.     - Discussed:     - Discussed with:  Patient      - Extended Intubation/Ventilatory Support Discussed: No.      - Patient is DNR/DNI Status: No          Postoperative Care    Pain management: IV analgesics, Oral pain medications, Multi-modal analgesia.   PONV prophylaxis: Ondansetron (or other 5HT-3), Background Propofol Infusion     Comments:               Minnie Sargent MD    I have reviewed the pertinent notes and labs in the chart from the past 30 days and (re)examined the patient.  Any updates or changes from those notes are reflected in this note.               # Hypertension: Noted on problem list          # DMII: A1C = 7.7 % (Ref range: 0.0 - 5.6 %) within past 6 months    # Obesity: Estimated body mass index is 33.37 kg/m  as calculated from the following:    Height as of 12/2/24: 1.588 m (5' 2.5\").    Weight as of 12/2/24: 84.1 kg (185 lb 6.4 oz).             "

## 2024-12-18 NOTE — BRIEF OP NOTE
Orthopaedic Surgery Brief Op-Note      Patient: Holly Muir  : 1955  Date of Service: 2024 3:29 PM    Pre-operative Diagnosis: Primary localized osteoarthritis of left knee [M17.12]  Post-operative Diagnosis: same    Procedure(s) Performed: Procedure(s):  ARTHROPLASTY, KNEE, TOTAL    Surgeons and Role:     * Sanford Sheehan MD - Primary     * Shayy Scott PA-C - Assisting     * Primitivo Chu MD    Anesthesia: General, spinal  EBL: 100 ml  Tourniquet time: 41 minutes    Implants:   Implant Name Type Inv. Item Serial No.  Lot No. LRB No. Used Action   IMP COMP FEMORAL S&N PARMJIT II NP CR SZ 3 LT [id number] - [id number] Total Joint Component/Insert IMP COMP FEMORAL S&N PARMJIT II NP CR SZ 3 LT [id number]  [employer] [id number] Left 1 Implanted   IMP INSERT ARTICULAR S&N LGN CR XLPE SZ1-2 9MM [id number] - [id number] Total Joint Component/Insert IMP INSERT ARTICULAR S&N LGN CR XLPE SZ1-2 9MM [id number]  [employer] [id number] Left 1 Implanted   IMP BASEPLATE TIBIAL PARMJIT II SZ 2 LT TI [id number] - [id number] Total Joint Component/Insert IMP BASEPLATE TIBIAL PARMJIT II SZ 2 LT TI [id number]  [employer] [id number] Left 1 Implanted   BONE CEMENT SIMPLEX W/TOBRAMYCIN 6197-9-001 - [id number] Cement, Bone BONE CEMENT SIMPLEX W/TOBRAMYCIN 6197-9-001  [employer] [id number] Left 2 Implanted         Intra-op Labs/Cxs/Specimens:   * No specimens in log *    Drains: none  Complications: No apparent complications during procedure  Findings: Please see dictated operative note for details    Disposition: Stable to PACU, then admit to Orthopaedics    POST OPERATIVE PLAN    Assessment/Plan: Holly Muir is a 69 year old female s/p Procedure(s):  ARTHROPLASTY, KNEE, TOTAL on 2024 with Dr. Sheehan.    Activity: Up with assist and assistive devices as needed until independent. Knee ROM as tolerated.   Weight bearing status: WBAT    Antibiotics: Cefazolin x 24 hours  Diet:  Begin with clear fluids and progress diet as tolerated. Bowel regimen. Anti-emetics PRN.    DVT prophylaxis:  Eliquis 2.5mg BID x 14 days, followed by ASA 162mg daily x 14 days then resume baseline ASA.   Elevation: Elevate heels off of bed on pillows, no pillows behind the knee at any time    Wound Care: Tegaderm and alginate x 2 weeks   Drains: none  Pain management: Orals PRN, IV for breakthrough only  X-rays: AP/Lat operative knee XR in PACU.  Physical Therapy: Mobilization, ROM, ADL's  Occupational Therapy: ADL's  Labs: Trend Hgb on PODs #1 & 2  Cultures: None  Consults: PT, OT. Hospitalist, appreciate assistance in caring for this patient throughout the perioperative period       Future Appointments   Date Time Provider Department Center   12/19/2024  8:15 AM Kelly Hyman, PT [facility] [place]   12/19/2024  9:00 AM Tejas Sanchez, OTR [facility] [place]   12/26/2024  9:40 AM [facility]   12/31/2024 11:20 AM Shayy Scott PA-C [facility] [place]   1/17/2025  9:40 AM Kelly Yan PA-C [facility] [place]   1/28/2025 12:00 PM Sanford Sheehan MD [facility] [place]   2/10/2025  1:30 PM Liz Calero, [facility]   10/31/2025  9:40 AM Joseph Martinez, OD [facility]       Disposition: Pending progress with therapies, pain control on orals, and medical stability, anticipate discharge to Home vs TCU on POD #1-2.    I assisted with positioning, prepping and draping, and closure.      Shayy Scott PA-C 12/18/2024 3:29 PM  Orthopedic Surgery

## 2024-12-19 VITALS
RESPIRATION RATE: 16 BRPM | SYSTOLIC BLOOD PRESSURE: 100 MMHG | OXYGEN SATURATION: 100 % | HEIGHT: 62 IN | DIASTOLIC BLOOD PRESSURE: 48 MMHG | HEART RATE: 68 BPM | WEIGHT: 184.53 LBS | TEMPERATURE: 97.2 F | BODY MASS INDEX: 33.96 KG/M2

## 2024-12-19 LAB
ANION GAP SERPL CALCULATED.3IONS-SCNC: 11 MMOL/L (ref 7–15)
BUN SERPL-MCNC: 15.1 MG/DL (ref 8–23)
CALCIUM SERPL-MCNC: 8.2 MG/DL (ref 8.8–10.4)
CHLORIDE SERPL-SCNC: 96 MMOL/L (ref 98–107)
CREAT SERPL-MCNC: 0.81 MG/DL (ref 0.51–0.95)
EGFRCR SERPLBLD CKD-EPI 2021: 79 ML/MIN/1.73M2
GLUCOSE BLDC GLUCOMTR-MCNC: 103 MG/DL (ref 70–99)
GLUCOSE BLDC GLUCOMTR-MCNC: 158 MG/DL (ref 70–99)
GLUCOSE BLDC GLUCOMTR-MCNC: 250 MG/DL (ref 70–99)
GLUCOSE BLDC GLUCOMTR-MCNC: 94 MG/DL (ref 70–99)
GLUCOSE SERPL-MCNC: 96 MG/DL (ref 70–99)
HCO3 SERPL-SCNC: 22 MMOL/L (ref 22–29)
HGB BLD-MCNC: 9.3 G/DL (ref 11.7–15.7)
POTASSIUM SERPL-SCNC: 3.5 MMOL/L (ref 3.4–5.3)
SODIUM SERPL-SCNC: 129 MMOL/L (ref 135–145)

## 2024-12-19 PROCEDURE — 999N000111 HC STATISTIC OT IP EVAL DEFER

## 2024-12-19 PROCEDURE — 97161 PT EVAL LOW COMPLEX 20 MIN: CPT | Mod: GP

## 2024-12-19 PROCEDURE — 80048 BASIC METABOLIC PNL TOTAL CA: CPT

## 2024-12-19 PROCEDURE — 97110 THERAPEUTIC EXERCISES: CPT | Mod: GP

## 2024-12-19 PROCEDURE — 99232 SBSQ HOSP IP/OBS MODERATE 35: CPT | Performed by: INTERNAL MEDICINE

## 2024-12-19 PROCEDURE — 36415 COLL VENOUS BLD VENIPUNCTURE: CPT | Performed by: PHYSICIAN ASSISTANT

## 2024-12-19 PROCEDURE — G0378 HOSPITAL OBSERVATION PER HR: HCPCS

## 2024-12-19 PROCEDURE — 250N000013 HC RX MED GY IP 250 OP 250 PS 637: Performed by: PHYSICIAN ASSISTANT

## 2024-12-19 PROCEDURE — 97116 GAIT TRAINING THERAPY: CPT | Mod: GP

## 2024-12-19 PROCEDURE — 250N000012 HC RX MED GY IP 250 OP 636 PS 637

## 2024-12-19 PROCEDURE — 250N000013 HC RX MED GY IP 250 OP 250 PS 637

## 2024-12-19 PROCEDURE — 85018 HEMOGLOBIN: CPT | Performed by: PHYSICIAN ASSISTANT

## 2024-12-19 PROCEDURE — 82565 ASSAY OF CREATININE: CPT

## 2024-12-19 PROCEDURE — 250N000013 HC RX MED GY IP 250 OP 250 PS 637: Performed by: ORTHOPAEDIC SURGERY

## 2024-12-19 PROCEDURE — 250N000011 HC RX IP 250 OP 636: Mod: JZ | Performed by: ORTHOPAEDIC SURGERY

## 2024-12-19 RX ORDER — METHOCARBAMOL 500 MG/1
500 TABLET, FILM COATED ORAL EVERY 6 HOURS PRN
Qty: 20 TABLET | Refills: 0 | Status: SHIPPED | OUTPATIENT
Start: 2024-12-19 | End: 2024-12-26

## 2024-12-19 RX ORDER — OXYCODONE HYDROCHLORIDE 5 MG/1
5-10 TABLET ORAL EVERY 4 HOURS PRN
Qty: 26 TABLET | Refills: 0 | Status: SHIPPED | OUTPATIENT
Start: 2024-12-19 | End: 2024-12-26

## 2024-12-19 RX ORDER — ASPIRIN 81 MG/1
81 TABLET ORAL 2 TIMES DAILY
Qty: 28 TABLET | Refills: 0 | Status: SHIPPED | OUTPATIENT
Start: 2024-12-19 | End: 2025-01-02

## 2024-12-19 RX ORDER — POLYETHYLENE GLYCOL 3350 17 G/17G
17 POWDER, FOR SOLUTION ORAL DAILY
Qty: 510 G | Refills: 0 | Status: SHIPPED | OUTPATIENT
Start: 2024-12-19

## 2024-12-19 RX ADMIN — OXYCODONE HYDROCHLORIDE 5 MG: 5 TABLET ORAL at 08:48

## 2024-12-19 RX ADMIN — PANTOPRAZOLE SODIUM 40 MG: 40 TABLET, DELAYED RELEASE ORAL at 08:51

## 2024-12-19 RX ADMIN — GABAPENTIN 1200 MG: 600 TABLET, FILM COATED ORAL at 08:50

## 2024-12-19 RX ADMIN — INSULIN ASPART 3 UNITS: 100 INJECTION, SOLUTION INTRAVENOUS; SUBCUTANEOUS at 12:38

## 2024-12-19 RX ADMIN — OXYCODONE HYDROCHLORIDE 5 MG: 5 TABLET ORAL at 10:01

## 2024-12-19 RX ADMIN — OXYCODONE HYDROCHLORIDE 5 MG: 5 TABLET ORAL at 04:10

## 2024-12-19 RX ADMIN — CEFAZOLIN SODIUM 2 G: 2 INJECTION, SOLUTION INTRAVENOUS at 05:56

## 2024-12-19 RX ADMIN — POLYETHYLENE GLYCOL 3350 17 G: 17 POWDER, FOR SOLUTION ORAL at 08:53

## 2024-12-19 RX ADMIN — BUSPIRONE HYDROCHLORIDE 30 MG: 10 TABLET ORAL at 08:50

## 2024-12-19 RX ADMIN — Medication 1 TABLET: at 08:51

## 2024-12-19 RX ADMIN — BUPROPION HYDROCHLORIDE 200 MG: 100 TABLET, FILM COATED, EXTENDED RELEASE ORAL at 08:50

## 2024-12-19 RX ADMIN — APIXABAN 2.5 MG: 2.5 TABLET, FILM COATED ORAL at 08:49

## 2024-12-19 RX ADMIN — PIOGLITAZONE HYDROCHLORIDE 45 MG: 45 TABLET ORAL at 08:52

## 2024-12-19 RX ADMIN — ACETAMINOPHEN 975 MG: 325 TABLET, FILM COATED ORAL at 01:04

## 2024-12-19 RX ADMIN — ACETAMINOPHEN 975 MG: 325 TABLET, FILM COATED ORAL at 08:49

## 2024-12-19 RX ADMIN — FERROUS GLUCONATE 324 MG: 324 TABLET ORAL at 08:51

## 2024-12-19 RX ADMIN — CARIPRAZINE 4.5 MG: 1.5 CAPSULE, GELATIN COATED ORAL at 08:52

## 2024-12-19 RX ADMIN — SENNOSIDES AND DOCUSATE SODIUM 1 TABLET: 50; 8.6 TABLET ORAL at 08:51

## 2024-12-19 RX ADMIN — Medication 25 MCG: at 08:51

## 2024-12-19 ASSESSMENT — ACTIVITIES OF DAILY LIVING (ADL)
ADLS_ACUITY_SCORE: 58
ADLS_ACUITY_SCORE: 55
ADLS_ACUITY_SCORE: 58
ADLS_ACUITY_SCORE: 55
ADLS_ACUITY_SCORE: 58

## 2024-12-19 NOTE — PROGRESS NOTES
Admission Note    Reason for admission: L total knee  Primary team notified of pt arrival.  Admitted from: PACU  Via: Bed  Accompanied by: PACU staff  Belongings: Placed in closet  Admission Required Doc Completed: Yes  Teaching: Orientation to unit and call light- call light within reach, use of console, meal times, when to call for the RN, and enforced importance of safety.  IV Access: R PIV infusing 75mL/hr of LR  Telemetry: No  Ht./Wt.: Completed  Code Status verified on armband: Yes  2 RN Skin Assessment Completed with: Louis CAMPOS RN  Suction/Ambu bag/Flowmeter at bedside: Yes    Pt status:     Temp:  [97.9  F (36.6  C)-101.1  F (38.4  C)] 97.9  F (36.6  C)  Pulse:  [73-81] 73  Resp:  [14-20] 16  BP: ()/(53-78) 130/67  SpO2:  [94 %-100 %] 99 %

## 2024-12-19 NOTE — PLAN OF CARE
Physical Therapy Discharge Summary    Reason for therapy discharge:    All goals and outcomes met, no further needs identified.    Progress towards therapy goal(s). See goals on Care Plan in [facility] electronic health record for goal details.  Goals met    Therapy recommendation(s):    Continued therapy is recommended.  Rationale/Recommendations:  pt plans to continue w/ OP PT.

## 2024-12-19 NOTE — PROGRESS NOTES
[facility]    Medicine Progress Note - Hospitalist Service, GOLD TEAM 18    Date of Admission:  12/18/2024    Assessment & Plan   A: Patient is a 70 y/o woman who has a past medical history significant for diabetes mellitus type II, hypertension, hyperlipidemia, major depressive disorder, schizoaffective disorder, insomnia, GERD and history of provoked pulmonary embolism. Patient underwent total knee arthroplasty on 18-Dec-2024 for primary localized arthritis of left knee.    P:  1.) Primary localized arthritis of left knee s/p surgical intervention on 18-Dec-2024:  - Patient receiving post-operative care.    2.) Diabetes mellitus type II with long-term use of insulin:  - Patient usually on lantus 20 units daily, mounjaro 12.5 mg weekly, actos 45 mg daily and metformin 1000 mg twice daily. This is to be resumed on discharge.     3.) Hypertension:  - Patient on propranolol and ramipril.  - After review of vital signs, patient to continue propranolol. Ramipril to be stopped for now. Patient to f/u with PCP for further management.    4.) Major depressive disorder, severe; schizoaffective disorder:  - Patient usually on wellbutrin, buspar, paxil, vraylar and gabapentin which are being continued.  - Patient to f/u with Psychiatry as scheduled.    5.) Iron deficiency anemia:  - Patient to continue iron supplementation.    6.) Hyperlipidemia:  - Patient on simvastatin.    7.) Insomnia:  - Patient on trazodone.    8.) Restless legs syndrome:  - Patient on requip.    9.) GERD:  - Patient on PPI.    10.) History of partial colectomy with colostomy creation in 2002:  - Colostomy care.    11.) History of provoked pulmonary embolism in May-2023:  - Patient had been on eliquis at one point but is no longer on this medication.           Diet: Advance Diet as Tolerated: Regular Diet Adult  Discharge Instruction - Regular Diet Adult    Bhatt Catheter: Not present  Lines: None     Cardiac  "Monitoring: None  Code Status: Full Code      Clinically Significant Risk Factors         # Hyponatremia: Lowest Na = 129 mmol/L in last 2 days, will monitor as appropriate  # Hypochloremia: Lowest Cl = 96 mmol/L in last 2 days, will monitor as appropriate          # Hypertension: Noted on problem list           # DMII: A1C = 7.7 % (Ref range: 0.0 - 5.6 %) within past 6 months, PRESENT ON ADMISSION  # Obesity: Estimated body mass index is 33.75 kg/m  as calculated from the following:    Height as of this encounter: 1.575 m (5' 2\").    Weight as of this encounter: 83.7 kg (184 lb 8.4 oz)., PRESENT ON ADMISSION            Social Drivers of Health    Depression: Not at risk (12/2/2024)    PHQ-2     PHQ-2 Score: 0   Recent Concern: Depression - At risk (9/9/2024)    PHQ-2     PHQ-2 Score: 3   Tobacco Use: Medium Risk (12/18/2024)    Patient History     Smoking Tobacco Use: Former     Smokeless Tobacco Use: Never     Passive Exposure: Never   Financial Resource Strain: Low Risk  (1/24/2024)    Financial Resource Strain     Within the past 12 months, have you or your family members you live with been unable to get utilities (heat, electricity) when it was really needed?: No   Recent Concern: Financial Resource Strain - High Risk (12/1/2023)    Financial Resource Strain     Within the past 12 months, have you or your family members you live with been unable to get utilities (heat, electricity) when it was really needed?: Yes   Physical Activity: Inactive (1/24/2024)    Exercise Vital Sign     Days of Exercise per Week: 0 days     Minutes of Exercise per Session: 0 min   Stress: Stress Concern Present (1/24/2024)    [employer] [place] [employer] - Occupational Stress Questionnaire     Feeling of Stress : To some extent   Social Connections: Unknown (1/24/2024)    Social Connection and Isolation Panel [NHANES]     Frequency of Communication with Friends and Family: More than three times a week     Frequency of " Social Gatherings with Friends and Family: More than three times a week     Attends Rastafari Services: Patient declined     Active Member of Clubs or Organizations: Patient declined     Attends Club or Organization Meetings: Patient declined     Marital Status: Patient declined          Disposition Plan     Medically Ready for Discharge: Anticipated Today             Rome Lan MD  Hospitalist Service, GOLD TEAM 18  [facility]  Securely message with [employer] (more info)  Text page via [facility] Paging/Directory   See signed in provider for up to date coverage information  ______________________________________________________________________    Interval History   Patient noted some soreness following physical therapy. Patient noted no other problems.    Patient noted that blood glucose usually would run 110-120 as outpatient.     Physical Exam   Vital Signs: Temp: 97.2  F (36.2  C) Temp src: Oral BP: 100/48 Pulse: 68   Resp: 16 SpO2: 100 % O2 Device: None (Room air) Oxygen Delivery: 6 LPM  Weight: 184 lbs 8.4 oz    General: Patient  comfortable, NAD.    Labs noted.  Sodium 129; Potassium 3.5; Creatinine 0.81;  Hb 9.3;

## 2024-12-19 NOTE — PLAN OF CARE
"Goal Outcome Evaluation:      Plan of Care Reviewed With: patient    Overall Patient Progress: improvingOverall Patient Progress: improving    Outcome Evaluation: Pt AOx4, up SBA/Ax1 w/ walker, voiding without difficulty, pain managed w/ oral meds. Planning to discharge home today    /48 (BP Location: Right arm)   Pulse 68   Temp 97.2  F (36.2  C) (Oral)   Resp 16   Ht 1.575 m (5' 2\")   Wt 83.7 kg (184 lb 8.4 oz)   SpO2 100%   BMI 33.75 kg/m    O2>90% ORA, denies feeling SOB, lightheadedness. Lungs clear, equal bilateral    Output: Voids spontaneously, denies difficulties    Last BM: Colostomy bag    Skin: Surgical incision to L knee   Pain: Manaieg w/ prn PO oxy   CMS: CMS intact   Dressing: CDI   Diet: Reg, denies nausea and vomiting    LDA: PIV removed prior to discharge     DISCHARGE SUMMARY    Pt discharging to: home  Transportation: Family  AVS given and discussed: Pt was given AVS and pt states understanding of content. Pt has no further questions.   Stoplight Tool given and discussed: Yes, no further questions.  Medications given: Yes, discussed. No further questions.   Belongings returned: Yes, ensured all belongings packed and sent with pt. No items in security.   Comments: Escorted safely to elevators. Pt left at 1311          "

## 2024-12-19 NOTE — DISCHARGE INSTRUCTIONS
DVT prophylaxis:  Eliquis 2.5 mg BID starting POD #1 for 2 weeks, followed by ASA 81 mg BID x 2 weeks, then resume home dose of 81 mg ASA daily thereafter.

## 2024-12-19 NOTE — PROGRESS NOTES
12/19/24 0900   Appointment Info   Signing Clinician's Name / Credentials (PT) Kelly Hyman DPT   Rehab Comments (PT) L TKA   Living Environment   People in Home child(ervin), adult;grandchild(ervin)  (dtr, CHRISTOFER, grandson - 24/7 support)   Current Living Arrangements house   Home Accessibility stairs to enter home;stairs within home   Number of Stairs, Main Entrance 1   Stair Railings, Main Entrance none   Number of Stairs, Within Home, Primary seven   Stair Railings, Within Home, Primary railing on right side (ascending)   Transportation Anticipated family or friend will provide   Living Environment Comments can stay on main level once inside 1 + 7; has bathtub   Self-Care   Usual Activity Tolerance moderate  (cane baseline)   Current Activity Tolerance fair  (more pain today)   Equipment Currently Used at Home tub bench;walker, rolling;colostomy/ostomy supplies;cane, straight;raised toilet seat   Fall history within last six months yes   Number of times patient has fallen within last six months 2  (feet not in the right place per pt)   Activity/Exercise/Self-Care Comment dtr can help w/ dressing as needed - she helps some at baseline  - dtr is PCA   General Information   Onset of Illness/Injury or Date of Surgery 12/19/24   Referring Physician Shayy Scott, SAFIA   Patient/Family Therapy Goals Statement (PT) to walk without pain   Pertinent History of Current Problem (include personal factors and/or comorbidities that impact the POC) Holly Muir is a 69 year old female s/p Left TKA on 12/18/24 with Dr. Sheehan. Doing well.   Existing Precautions/Restrictions fall;weight bearing   Weight-Bearing Status - LUE full weight-bearing   Weight-Bearing Status - RUE full weight-bearing   Weight-Bearing Status - LLE weight-bearing as tolerated   Weight-Bearing Status - RLE full weight-bearing   General Observations Pt supine in bed, struggling w/ pain today but agreeable to therapy   Cognition   Affect/Mental Status  (Cognition) WFL   Pain Assessment   Patient Currently in Pain Yes, see Vital Sign flowsheet  (9-10/10)   Integumentary/Edema   Integumentary/Edema Comments pt w/ post op invision and dressing in place, ACE wrap present   Posture    Posture Forward head position;Protracted shoulders   Range of Motion (ROM)   Range of Motion ROM deficits secondary to surgical procedure;ROM deficits secondary to pain   Strength (Manual Muscle Testing)   Strength (Manual Muscle Testing) Deficits observed during functional mobility   Bed Mobility   Comment, (Bed Mobility) Regine   Transfers   Comment, (Transfers) CGA-modA   Gait/Stairs (Locomotion)   Comment, (Gait/Stairs) CGA-SBA w/ FWW   Balance   Balance no deficits were identified   Sensory Examination   Sensory Perception patient reports no sensory changes   Clinical Impression   Criteria for Skilled Therapeutic Intervention Yes, treatment indicated   PT Diagnosis (PT) impaired functional mobility   Influenced by the following impairments pain,postop prec, weakness   Functional limitations due to impairments impaired bed mob, transfers, amb, stairs   Clinical Presentation (PT Evaluation Complexity) stable   Clinical Presentation Rationale per clinical judgment   Clinical Decision Making (Complexity) low complexity   Planned Therapy Interventions (PT) bed mobility training;gait training;home exercise program;patient/family education;ROM (range of motion);stair training;strengthening;transfer training;progressive activity/exercise;risk factor education;home program guidelines   Risk & Benefits of therapy have been explained evaluation/treatment results reviewed;care plan/treatment goals reviewed;risks/benefits reviewed;current/potential barriers reviewed;participants voiced agreement with care plan;participants included;patient   Clinical Impression Comments Pt mobilizing well w/ Ax1 and use of FWW, pt reports dtr is PCA at baseline and has been assisting w/ bed mob and transfers  baseline, pt and dtr can continue to manage as they were prior and pt feels ready to DC home w/ family support. Biggest limitation today is pain but did not impact safety w/ mobility.   PT Total Evaluation Time   PT Eval, Low Complexity Minutes (30177) 3   Physical Therapy Goals   PT Frequency One time eval and treatment only   PT Predicted Duration/Target Date for Goal Attainment 12/19/24   PT Goals Bed Mobility;Transfers;Gait;Stairs   PT: Bed Mobility Minimal assist;Supine to/from sit;Within precautions;Goal Met  (Dtr completing this at baseline)   PT: Transfers Supervision/stand-by assist;Sit to/from stand;Bed to/from chair;Assistive device;Within precautions;Goal Met  (dtr can assist as needed)   PT: Gait Supervision/stand-by assist;Assistive device;Rolling walker;Within precautions;100 feet;Goal Met   PT: Stairs Supervision/stand-by assist;Within precautions;7 stairs;Rail on right;Completed  (pt completed 4 stairs w/ good tolerance and stability, declines concern for home)   Interventions   Interventions Quick Adds Therapeutic Procedure;Gait Training   Therapeutic Procedure/Exercise   Ther. Procedure: strength, endurance, ROM, flexibillity Minutes (21276) 12   Symptoms Noted During/After Treatment increased pain   Treatment Detail/Skilled Intervention Pt completed HEP supine in bed x10 to L LE as follows: AP, QS (increased pain), GS, HS, and heel slides AAROM w/ good tolerance. Pt denies questions regarding HEP.   Gait Training   Gait Training Minutes (77054) 25   Symptoms Noted During/After Treatment (Gait Training) fatigue;increased pain   Treatment Detail/Skilled Intervention Pt supine in bed on PT arrival, agreeable to PT. Pt completes sup>sit EOB w/ Regine to L LE. Reports that dtr can assist w/ this at home. Initial STS at FWW completed w/ modA d/t pain and stiffness, again reports dtr helped at baseline and confirmed they have gait belt at home. Pt amb for 120ft w/ FWW and CGA progressing to SBA w/ step to  progression to step through gait pattern w/ good functional ROM observed despite pain levels reported. Pt completes SPT and sits in WC for PT to demo stairs, VC to ext L LE to improve comfort. Pt completes STS from WC w/ CGA with much better form and ind. Pt ascends/descends 4 stairs w/ B UE support on R railing, step to pattern, and CGA maintained. Pt w/ good stability and recall of sequencing during stairs. Pt denies additional trial and feels she can manage this at home, plans to use doorjam as B UE support on single stair to enter. Pt requests to amb back, amb additional 130ft w/ FWW and step through pattern w/ SBA maintained. Pt completes SPT to sit EOB w/ FWW and able to self recall ext of L LE to prep for sit. Pt needs Regine to LEs for sit>sup. Pt able to boost self up in bed. Left supine in bed w/ needs met and no further q's for PT.   PT Discharge Planning   PT Plan DC PT   PT Discharge Recommendation (DC Rec)   (defer to ortho)   PT Rationale for DC Rec Pt mobilizing well w/ Ax1 and use of FWW, pt reports dtr is PCA at baseline and has been assisting w/ bed mob and transfers baseline, pt and dtr can continue to manage as they were prior and pt feels ready to DC home w/ family support. Biggest limitation today is pain but did not impact safety w/ mobility.   PT Brief overview of current status SBA w/ FWW   PT Equipment Needed at Discharge walker, rolling   Physical Therapy Time and Intention   Timed Code Treatment Minutes 37   Total Session Time (sum of timed and untimed services) 40

## 2024-12-19 NOTE — PLAN OF CARE
"Goal Outcome Evaluation:      Plan of Care Reviewed With: patient    Overall Patient Progress: improvingOverall Patient Progress: improving    Outcome Evaluation: AxO x4 on RA. VSS. voiding well, has urinary frequency; so placed on purewick overnight. Patient ambulaated with nursing in enriquez once sensation in legs returned. Ax1 with gb and walker. pain managed with scheduled medications and PRN robaxin. Dressing CDI. patient resting with call light within reach    VS: BP (!) 143/66   Pulse 73   Temp 97.9  F (36.6  C) (Oral)   Resp 16   Ht 1.575 m (5' 2\")   Wt 83.7 kg (184 lb 8.4 oz)   SpO2 98%   BMI 33.75 kg/m     O2: >90% on RA  Denies SOB and chest pain    Output: Voiding spontaneously without difficulty   Using purewick overnight due to urinary frequency    Last BM: 12/18, colostomy in place   Activity: Ax1 with gait belt and walker    Skin: L knee surgical incision   Redness under abdominal folds and nanika area  Colostomy    Pain: Pain managed with scheduled medications and PRN robaxin and oxy   CMS: AxOx4  Numbness to BLE   Dressing: L knee surgical dressing CDI   Diet: Regular  Glucose checks (164, 231)   LDA: R PIV SL   Equipment: IV pole, walker, call light, personal belongings    Plan: Potential discharge tomorrow pending therapies and pain control    Additional Info:      "

## 2024-12-19 NOTE — PROGRESS NOTES
"Orthopedic Surgery Progress Note 12/19/2024    S: Patient seen this morning, resting comfortably in bed. No acute events overnight. Pain controlled on current regimen. Pending PT. Denies any new numbness     O:  Temp: 97.9  F (36.6  C) Temp src: Oral BP: (!) 143/66 Pulse: 73   Resp: 16 SpO2: 98 % O2 Device: None (Room air) Oxygen Delivery: 6 LPM    Exam:  Gen: alert and oriented, responds to questions appropriately  Resp: non-labored on RA  MSK:    LLE:  - Dressings c/d/i  - SILT femoral/tibial/sural/saphenous/DP/SP nerves  - Fires quad, TA, EHL, FHL, GaSC  - PT/DP pulses 2+, foot wwp    Drain output: n/a.    No lab results found in last 7 days.    Invalid input(s): \"SEDRATE\"    Culture results: n/a    Assessment: Holly Muir is a 69 year old female s/p Left TKA on 12/18/24 with Dr. Sheehan. Doing well.    Plan:  Orthopedics primary, medicine co-management  Activity: Up with assist.ROMAT Left knee.   Weight bearing status: WBAT.  Antibiotics: continue periop Ancef 2g x 2 doses postoperatively.  Diet: Progress diet as tolerated.  DVT prophylaxis: SCDs and mechanical while in the hospital.  Eliquis 2.5 mg BID starting POD #1 for 2 weeks, followed by ASA 81 mg BID x 2 weeks, then resume home dose of 81 mg ASA daily thereafter. .   Bracing/Splinting: n/a.  Elevation: Elevate operative extremity as tolerated.   Wound Care: Keep dressing in place until POD #14..  Pain management: Utilize all oral meds first, IV meds for severe breakthrough pain after PO meds given adequate time to take effect.  X-rays: Completed in PACU.  Therapy: PT/OT evaluate prior to discharge.  Labs: Trend Hgb on POD #1.  Consults: PT, OT, medicine. Appreciate assistance in caring for this patient.  Disposition: Pending progress with therapies, pain control on orals, and medical stability. Anticipate discharge to home vs TCU on POD #1-2.  Follow-up: Clinic with Dr. Sheehan in 2 weeks.    Orthopedic surgery staff for this patient is Dr. Sheehan. " Discussed.

## 2024-12-19 NOTE — ANESTHESIA POSTPROCEDURE EVALUATION
Patient: Holly Muir    Procedure: Procedure(s):  ARTHROPLASTY, KNEE, TOTAL       Anesthesia Type:  Spinal    Note:  Disposition: Inpatient   Postop Pain Control: Uneventful            Sign Out: Well controlled pain   PONV: No   Neuro/Psych: Uneventful            Sign Out: Acceptable/Baseline neuro status   Airway/Respiratory: Uneventful            Sign Out: Acceptable/Baseline resp. status   CV/Hemodynamics: Uneventful            Sign Out: Acceptable CV status; No obvious hypovolemia; No obvious fluid overload   Other NRE:    DID A NON-ROUTINE EVENT OCCUR?            Last vitals:  Vitals Value Taken Time   BP 97/53 12/18/24 1615   Temp 37  C (98.6  F) 12/18/24 1606   Pulse 73 12/18/24 1636   Resp 15 12/18/24 1636   SpO2 96 % 12/18/24 1636   Vitals shown include unfiled device data.    Electronically Signed By: Miah Babin MD  December 18, 2024  7:59 PM

## 2024-12-19 NOTE — PLAN OF CARE
Goal Outcome Evaluation:      Plan of Care Reviewed With: patient          Outcome Evaluation: Alert and oriented. pain well manageable with pain med. pt had slight numbness from surgery in right leg. voiding well using bathroom. SBA with walker. pt has colostomy bag mostly managed by pt. able to make her needs known. will continue with plan of care

## 2024-12-19 NOTE — PLAN OF CARE
Occupational Therapy: Orders received. Chart reviewed and discussed with care team.? Occupational Therapy not indicated as the patient declines concern with managing ADL at discharge. Pt will have assist from dtr who is PCA at home.?No OT needs identified at this markos per PT. Defer discharge recommendations to Ortho team.? Will complete orders.

## 2024-12-19 NOTE — PROVIDER NOTIFICATION
Gold night provider notified:    Patient ate dinner prior to sliding scale insulin being ordered. Provider stated to wait until bedtime coverage for insulin administration.

## 2024-12-20 NOTE — OP NOTE
OPERATIVE REPORT    DATE OF SERVICE: 12/20/24    SURGEON: Sanford Sheehan MD.    ASSISTANT(S):  Shayy Scott PA-C The assistance of the Physician Assistant was necessary for positioning, draping, retraction, leg positioning for placement of implants, and layered closure. There was no qualified available resident or fellow who was aware of the intricies of the procedure and requirements of all portions of the procedure.  Adrian Chu MD    PREOPERATIVE DIAGNOSIS:  Osteoarthritis    POSTOPERATIVE DIAGNOSIS:  Osteoarthritis    OPERATION PERFORMED:  left total knee arthroplasty    IMPLANTS:    Implant Name Type Inv. Item Serial No.  Lot No. LRB No. Used Action   IMP COMP FEMORAL S&N PARMJIT II NP CR SZ 3 LT [id number] - [id number] Total Joint Component/Insert IMP COMP FEMORAL S&N PARMJIT II NP CR SZ 3 LT [id number]  [employer] [id number] Left 1 Implanted   IMP INSERT ARTICULAR S&N LGN CR XLPE SZ1-2 9MM [id number] - [id number] Total Joint Component/Insert IMP INSERT ARTICULAR S&N LGN CR XLPE SZ1-2 9MM [id number]  [employer] [id number] Left 1 Implanted   IMP BASEPLATE TIBIAL PARMJIT II SZ 2 LT TI [id number] - [id number] Total Joint Component/Insert IMP BASEPLATE TIBIAL PARMJIT II SZ 2 LT TI [id number]  [employer] [id number] Left 1 Implanted   BONE CEMENT SIMPLEX W/TOBRAMYCIN 6197-9-001 - [id number] Cement, Bone BONE CEMENT SIMPLEX W/TOBRAMYCIN 6197-9-001  [employer] [id number] Left 2 Implanted       ANESTHETIC: Spinal    OPERATIVE FINDINGS:  End stage arthrosis of the knee. Patella appropriate for patellar retention.     BLOOD LOSS: About 100 cc    TOURNIQUET TIME: 41 minutes at 250 mmHg    COMPLICATIONS:  None apparent    OPERATIVE INDICATIONS:  The patient has a long history of debilitating pain secondary to ostearthritis of the knee.  Despite comprehensive non-operative management these symptoms continued to interfere with activities of daily living.  After discussion of further  treatment options including the risks and benefits that patient elected to proceed with a total knee.    DESCRIPTION OF THE PROCEDURE:  The patient was identified in the preoperative holding area.  The consent form including the risks and benefits were reviewed with the patient.  The operative limb was identified and marked.  The patient was brought back to the operating room and placed on the operating table.  An spinal anesthetic was placed by the anesthesia team.   The patient was prepped and draped in the normal standard fashion for a knee replacement.  A time-out was called.  Antibiotics were given.The tourniquet was inflated.  We utilized an approximately 15 cm curvilinear incision, centered on the patella ridge, and performed a standard parapatellar approach to the knee. There was normal appearing joint fluid seen upon arthrotomy. A modest medial release was performed. The patella was everted. Medial and lateral retractors were placed. The knee was brought into flexion. The AP axis of the knee was marked and an intra-medullary femoral guide melissa was placed as templated. The epicondylar axis was then marked. The anterior femoral cutting guide was placed and the epicondylar and AP axes were used to set the rotation of the jig. A stylus was then used to set the depth of the resection. Retractors were used to protect the skin and the anterior cut was made. The distal femoral cutting guide was then placed and pinned. The resection was set to remove the cartilage from the intra-condylar notch. The femoral sizing guide was then placed; the knee sized well to a 3. The four-in-one femoral cutting guide was then placed and pinned. The cuts were made. The trial component was well fit. Attention was then turned to the tibia. A PCL retractor was then placed and used to bring the tibia anterior. Medial and lateral retractors were placed. A tibial intra-medullary guide melissa was placed. The tibial cutting guide was then  assembled on the guide melissa. The slope was set to nearly mirror the anatomic slope. The tibial cut was first checked with a two-and-nine guide and the cut was then made. A lamina  was then used to remove the necessary bone from the posterior condyles and then the remaining meniscus was removed. The tibia and was sized and it sized well to a 2. The trial components were then assembled sony  9 mm trial poly was used for trialing. The knee came out into full extension and the knee had greater than 120 degrees of flexion. It was well balanced in the coronal plane with varus and valgus stress at 0 and 30 degrees of flexion. Happy with the reconstruction the tibial rotation was marked, the trial components were removed, and all cancellous surfaces were cleaned with a pulse lavage and then dried. The components were cemented into place, a trial poly placed, the knee brought into extension, and the cement was allowed to dry. With the cement dry the knee was lavaged. The tourniquet was let down and hemostasis was achieved. The knee was then brought into extension and the final poly was placed. It was seen to lock into place. The soft tissues were then injected with analgesic. The capsule was closed with interrupted Vicryl, the dermis with interrupted Vicryl, and skin with running monocryl, Dermabond and steri-strips.  At the end of the procedure the sponge and needle counts were correct times two.  The patient tolerated the procedure well and returned to the PAR extubated and stable.    POSTOPERATIVE PLAN:  1. Weight bearing as tolerated  2. DVT prophylaxis Eliquis 2.5 mg grams twice daily x 14 days followed by  mg daily x 14 days then back to baseline ASA  4. 24 hours of prophylactic antibiotics  5. Follow-up:  Wound clinic in 2 weeks and with [facility] in clinic in 6 weeks for x-rays and a rehabilitation check.

## 2024-12-24 NOTE — DISCHARGE SUMMARY
ORTHOPAEDIC SURGERY DISCHARGE SUMMARY     Date of Admission: 12/18/2024  Date of Discharge: 12/19/2024  1:11 PM  Disposition: Home  Staff Physician: No att. providers found  Primary Care Provider: Tiffany King    DISCHARGE DIAGNOSIS:  Primary localized osteoarthritis of left knee [M17.12]    PROCEDURES: Procedure(s):  Left total knee arthroplasty on 12/18/2024    BRIEF HISTORY:  68 y/o woman who has a past medical history significant for diabetes mellitus type II, hypertension, hyperlipidemia, major depressive disorder, schizoaffective disorder, insomnia, GERD and history of provoked pulmonary embolism. Patient underwent total knee arthroplasty on 18-Dec-2024 for primary localized arthritis of left knee and was admitted post op.     HOSPITAL COURSE:    The patient was admitted following the above listed procedures for pain control and rehabilitation. Holly Muir did well post-operatively. Medicine was consulted post operatively to aid in management of medical co-morbidities. The patient received routine nursing cares and at the time of discharge was medically stable. Vital signs were stable throughout admission. The patient is tolerating a regular diet and is voiding spontaneously. All PT/OT goals have been met for safe mobility. Pain is now controlled on oral medications which will be available on discharge. Stool softeners have been used while taking pain medications to help prevent constipation. Holly Muir is deemed medically safe to discharge.     Antibiotics:  Ancef given periop and 24 hours postop.   DVT prophylaxis:  Eliquis 2.5 mg BID x 2 weeks, followed by ASA 81 mg BID x 2 weeks, and then resume daily 81 mg ASA (home dose) initiated after surgery  PT Progress:  Has met PT/OT goals for safe mobility.    Pain Meds:  Weaned off all IV pain meds by discharge.  Inpatient Events: No significant events or complications.     PHYSICAL EXAM:    Gen: alert and oriented, responds to questions  appropriately  Resp: non-labored on RA  MSK:     LLE:  - Dressings c/d/i  - SILT femoral/tibial/sural/saphenous/DP/SP nerves  - Fires quad, TA, EHL, FHL, GaSC  - PT/DP pulses 2+, foot wwp    FOLLOWUP:    Follow up with Dr. Sheehan at 4 weeks postoperatively.    Future Appointments   Date Time Provider Department Center   12/26/2024  9:40 AM Gigi Jaramillo, PT [facility]   12/31/2024 11:20 AM Shayy Scott PA-C [facility]   1/17/2025  9:40 AM Kelly Yan PA-C [facility] [place]   1/28/2025 12:00 PM Sanford Sheehan MD [facility]   2/10/2025  1:30 PM Liz Calero, [facility]   10/31/2025  9:40 AM Joseph Martinez, ANNI [facility]       Orthopaedic Surgery appointments are at the [facility] ([address] [place] [address]). Call [phone] to schedule a follow-up appointment at this location with your provider.     PLANNED DISCHARGE ORDERS:     DVT Prophylaxis: Eliquis 2.5 mg BID x 2 weeks, followed by ASA 81 mg BID x 2 weeks, and then resume daily 81 mg ASA (home dose)     Activity: WBAT     Wound Care: see Below      Discharge Medication List as of 12/19/2024 12:27 PM        START taking these medications    Details   apixaban ANTICOAGULANT (ELIQUIS) 2.5 MG tablet Take 1 tablet (2.5 mg) by mouth 2 times daily for 14 days. DVT prophylaxis:  Eliquis 2.5mg BID x 14 days, followed by ASA 162mg daily x 14 days then resume baseline ASA., Disp-28 tablet, R-0, E-Prescribe      !! aspirin 81 MG EC tablet Take 1 tablet (81 mg) by mouth 2 times daily for 14 days., Disp-28 tablet, R-0, E-PrescribeDVT prophylaxis:  Eliquis 2.5mg BID x 14 days, followed by ASA 162mg daily x 14 days then resume baseline ASA.      !! aspirin 81 MG EC tablet Take 1 tablet (81 mg) by mouth 2 times daily., Disp-60 tablet, R-0, E-Prescribe      methocarbamol (ROBAXIN) 500 MG tablet Take 1 tablet (500 mg) by mouth every 6 hours as needed for muscle spasms.,  Disp-20 tablet, R-0, E-Prescribe      oxyCODONE (ROXICODONE) 5 MG tablet Take 1-2 tablets (5-10 mg) by mouth every 4 hours as needed for moderate pain., Disp-26 tablet, R-0, E-Prescribe      polyethylene glycol (MIRALAX) 17 GM/Dose powder Take 17 g by mouth daily., Disp-510 g, R-0, E-Prescribe      senna-docusate (SENOKOT-S/PERICOLACE) 8.6-50 MG tablet Take 1-2 tablets by mouth 2 times daily as needed for constipation. Take while on oral narcotics to prevent or treat constipation., Disp-30 tablet, R-0, E-PrescribeWhile taking narcotics       !! - Potential duplicate medications found. Please discuss with provider.        CONTINUE these medications which have CHANGED    Details   acetaminophen (TYLENOL) 325 MG tablet Take 2 tablets (650 mg) by mouth every 4 hours as needed for other (mild pain)., Disp-100 tablet, R-0, E-Prescribe           CONTINUE these medications which have NOT CHANGED    Details   aspirin (ASPIRIN LOW DOSE) 81 MG chewable tablet TAKE 1 TABLET BY MOUTH EVERY DAY, Disp-90 tablet, R-0, E-Prescribe      buPROPion (WELLBUTRIN SR) 200 MG 12 hr tablet Take 1 tablet (200 mg) by mouth daily (with breakfast)., Disp-30 tablet, R-5, E-Prescribe      busPIRone HCl (BUSPAR) 30 MG tablet TAKE 1 TABLET BY MOUTH TWICE A DAY, Disp-60 tablet, R-5, E-Prescribe      carboxymethylcellulose (REFRESH PLUS) 0.5 % SOLN ophthalmic solution PLACE 1 DROP INTO BOTH EYES 2 TIMES DAILY AS NEEDED FOR DRY EYES., Disp-30 mL, R-11, E-Prescribe      cariprazine (VRAYLAR) 4.5 MG capsule Take 1 capsule (4.5 mg) by mouth daily., Disp-30 capsule, R-5, E-PrescribeDose increase      cetirizine (ZYRTEC) 10 MG tablet TAKE 1 TABLET (10 MG) BY MOUTH DAILY., Disp-90 tablet, R-1, E-Prescribe      gabapentin (NEURONTIN) 600 MG tablet TAKE 2 TABLETS (1,200 MG) BY MOUTH 3 TIMES DAILY FOR 30 DAYS, Disp-180 tablet, R-5, E-PrescribeDX Code Needed  PATIENT WOULD  LIKE TO GET MORE PERSCRIPTION PLEASE.      insulin glargine (LANTUS SOLOSTAR) 100 UNIT/ML  pen INJECT 20 UNITS AT BEDTIME AS DIRECTED + 2 UNITS FOR PRIMING OF PEN., Disp-30 mL, R-3, E-PrescribeIf Lantus is not covered by insurance, may substitute Basaglar or Semglee or other insulin glargine product per insurance preference at same dose and frequ ency.        metFORMIN (GLUCOPHAGE XR) 500 MG 24 hr tablet Take 2 tablets (1,000 mg) by mouth 2 times daily (with meals) Take two tablets with breakfast and one tablet with dinner for one week then increase to two tablets with breakfast and two tablets with dinner thereafter, Disp-360 tablet, R-3, E-Prescribe      mirtazapine (REMERON) 7.5 MG tablet Take 1 tablet (7.5 mg) by mouth at bedtime., Disp-30 tablet, R-5, E-Prescribe      MOUNJARO 12.5 MG/0.5ML SOAJ Inject 0.5 mLs (12.5 mg) subcutaneously every 7 days., Disp-2 mL, R-0, KARLA, E-Prescribe      Multiple Vitamin (DAILY-ISAAC MULTIVITAMIN) TABS TAKE 1 TABLET BY MOUTH EVERY DAY, Disp-90 tablet, R-0, E-Prescribe      omeprazole (PRILOSEC) 20 MG DR capsule TAKE 2 CAPSULES (40 MG) BY MOUTH DAILY *TAKE 30-60 MINUTES BEFORE A MEAL*, Disp-180 capsule, R-2, E-Prescribe      ondansetron (ZOFRAN ODT) 4 MG ODT tab Place 4 mg under the tongue., Historical      PARoxetine (PAXIL) 40 MG tablet Take 1 tablet (40 mg) by mouth every evening., Disp-30 tablet, R-5, E-Prescribe      pioglitazone (ACTOS) 45 MG tablet Take 1 tablet (45 mg) by mouth daily, Disp-90 tablet, R-1, E-Prescribe      propranolol ER (INDERAL LA) 80 MG 24 hr capsule TAKE 1 CAPSULE BY MOUTH EVERY DAY, Disp-90 capsule, R-2, E-Prescribe      rOPINIRole (REQUIP) 0.25 MG tablet TAKE 1 TABLET (0.25 MG) BY MOUTH EVERY EVENING, Disp-90 tablet, R-0, E-Prescribe      simvastatin (ZOCOR) 20 MG tablet TAKE 1 TABLET BY MOUTH EVERYDAY AT BEDTIME, Disp-90 tablet, R-0, E-Prescribe      traZODone (DESYREL) 50 MG tablet Take 50 mg by mouth., Historical      vitamin C ([facility] VITAMIN C) 500 MG tablet 1/2 TABLET BY MOUTH JACOBSON WITH IRON SUPPLEMENT BEFORE MEAL, Disp-45 tablet, R-3,  E-Prescribe      VITAMIN D3 25 MCG (1000 UT) tablet TAKE 1 TABLET BY MOUTH EVERY DAY, Disp-90 tablet, R-3, E-Prescribe      blood glucose monitoring (NO BRAND SPECIFIED) meter device kit Use to test blood sugar 4  times daily or as directed. Accuceck Guide meterDisp-1 kit, [id number]      blood glucose monitoring (ONE TOUCH ULTRA 2) meter device kit USE TO TEST BLOOD SUGAR 4 TIMES DAILY OR AS DIRECTED. ACCUCECK GUIDE METERHistorical      blood glucose monitoring (ULTRA THIN 30G) lancets Use to test blood sugar 4  times daily or as directed.lancets for acchu check guide, Disp-400 each, R-3, E-Prescribe      Continuous Glucose  (FREESTYLE CM 2 READER) GALINA 1 DOSE CONTINUOUS, Disp-1 each, R-0, E-Prescribe      Continuous Glucose Sensor (FREESTYLE CM 2 SENSOR) MISC Use one sensor every 14 days to read blood sugar, Disp-9 each, R-3, E-Prescribe      ferrous gluconate (FERGON) 324 (38 Fe) MG tablet Take 1 tablet (324 mg) by mouth daily (with breakfast), Disp-90 tablet, R-3, E-Prescribe      insulin pen needle (31G X 5 MM) 31G X 5 MM miscellaneous Use four times 4 day times a dayDisp-200 each, [id number]      nystatin (MYCOSTATIN) 985997 UNIT/GM external powder Apply 1 dose topically 3 times daily as neededDisp-60 g, [id number]      ONETOUCH ULTRA test strip USE TO TEST BLOOD SUGAR 4 TIMES DAILY OR AS DIRECTED. ACCU CHECK GUIDE STRIPS, Disp-400 strip, R-3, E-Prescribe           STOP taking these medications       diclofenac (VOLTAREN) 1 % topical gel Comments:   Reason for Stopping:         oxyCODONE-acetaminophen (PERCOCET) 5-325 MG tablet Comments:   Reason for Stopping:         ramipril (ALTACE) 5 MG capsule Comments:   Reason for Stopping:                 Discharge Procedure Orders   Basic metabolic panel  (Ca, Cl, CO2, Creat, Gluc, K, Na, BUN)   Standing Status: Future Standing Exp. Date: 01/19/25     Physical Therapy  Referral   Standing Status: Future   Referral Priority: Routine  "Referral Type: Rehab Therapy Physical Therapy   Number of Visits Requested: 1     Reason for your hospital stay   Order Comments: Total knee replacement surgery     When to call - Contact Surgeon Team   Order Comments: You may experience symptoms that require follow-up before your scheduled appointment. Refer to the \"Stoplight Tool\" for instructions on when to contact your Surgeon Team if you are concerned about pain control, blood clots, constipation, or if you are unable to urinate.     When to call - Reach out to Urgent Care   Order Comments: If you are not able to reach your Surgeon Team and you need immediate care, go to the Orthopedic Walk-in Clinic or Urgent Care at your Surgeon's office.  Do NOT go to the Emergency Room unless you have shortness of breath, chest pain, or other signs of a medical emergency.     When to call - Reasons to Call 911   Order Comments: Call 911 immediately if you experience sudden-onset chest pain, arm weakness/numbness, slurred speech, or shortness of breath     Discharge Instruction - Breathing exercises   Order Comments: Perform breathing exercises 10 times per hours while awake for 2 weeks. (If given, use your Incentive Spirometer)     Symptoms - Fever Management   Order Comments: A low grade fever can be expected after surgery.  Use acetaminophen (TYLENOL) as needed for fever management.  Contact your Surgeon Team if you have a fever greater than 101.5 F, chills, and/or night sweats.     Symptoms - Constipation management   Order Comments: Constipation (hard, dry bowel movements) is expected after surgery due to the combination of being less active, the anesthetic, and the opioid pain medication.  You can do the following to help reduce constipation:  ~  FLUIDS:  Drink clear liquids (water or Gatorade), or juice (apple/prune).  ~  DIET:  Eat a fiber rich diet.    ~  ACTIVITY:  Get up and move around several times a day.  Increase your activity as you are able.  MEDICATIONS:  " Reduce the risk of constipation by starting medications before you are constipated.  You can take Miralax   (1 packet as directed) and/or a stool softener (Senokot 1-2 tablets 1-2 times a day).  If you already have constipation and these medications are not working, you can get magnesium citrate and use as directed.  If you continue to have constipation you can try an over the counter suppository or enema.  Call your Surgeon Team if it has been greater than 3 days since your last bowel movement.     Symptoms - Reduced Urine Output   Order Comments: Changes in the amount of fluids you drank before and after surgery may result in problems urinating.  It is important to stay well-hydrated after surgery and drink plenty of water. If it has been greater than 8 hours since you have urinated despite drinking plenty of water, call your Surgeon Team.     Activity - Exercises to prevent blood clots   Order Comments: Unless otherwise directed by your Surgeon team, perform the following exercises at least three times per day for the first four weeks after surgery to prevent blood clots in your legs: 1) Point and flex your feet (Ankle Pumps), 2) Move your ankle around in big circles, 3) Wiggle your toes, 4) Walk, even for short distances, several times a day, will help decrease the risk of blood clots.     Order Specific Question Answer Comments   Is discharge order? Yes      Comfort and Pain Management - Pain after Surgery   Order Comments: Pain after surgery is normal and expected.  You will have some amount of pain for several weeks after surgery.  Your pain will improve with time.  There are several things you can do to help reduce your pain including: rest, ice, elevation, and using pain medications as needed. Contact your Surgeon Team if you have pain that persists or worsens after surgery despite rest, ice, elevation, and taking your medication(s) as prescribed. Contact your Surgeon Team if you have new numbness,  "tingling, or weakness in your operative extremity.     Comfort and Pain Management - Swelling after Surgery   Order Comments: Swelling and/or bruising of the surgical extremity is common and may persist for several months after surgery. In addition to frequent icing and elevation, gentle compressive support with an ACE wrap or tubigrip may help with swelling. Apply compression regularly, removing at least twice daily to perform skin checks. Contact your Surgeon Team if your swelling increases and is NOT associated with an increase in your activity level, or if your swelling increases and is associated with redness and pain.     Comfort and Pain Management - LOWER Extremity Elevation   Order Comments: Swelling is expected for several months after surgery. This type of swelling is usually associated with gravity and activity, and can be improved with elevation.   The best way to do this is to get your \"toes above your nose\" by laying down and placing several pillows lengthwise under your calf and heel. When elevating your leg keep your knee completely straight. Perform this elevation as often as possible especially for the first two weeks after surgery.     Comfort and Pain Management - Cold therapy   Order Comments: Ice can be used to control swelling and discomfort after surgery. Place a thin towel over your operative site and apply the ice pack overtop. Leave ice pack in place for 20 minutes, then remove for 20 minutes. Repeat this 20 minutes on/20 minutes off routine as often as tolerated.     Medication Instructions - Acetaminophen (TYLENOL) Instructions   Order Comments: You were discharged with acetaminophen (TYLENOL) for pain management after surgery. Acetaminophen most effectively manages pain symptoms when it is taken on a schedule without missing doses (every four, six, or eight hours). Your Provider will prescribe a safe daily dose between 3000 - 4000 mg.  Do NOT exceed this daily dose. Most patients use " acetaminophen for pain control for the first four weeks after surgery.  You can wean from this medication as your pain decreases.     Follow Up Care   Order Comments: Follow-up with your Surgeon Team in 2 weeks for wound check.     Activity - Total Knee Arthroplasty     Order Specific Question Answer Comments   Is discharge order? Yes      Return to Driving   Order Comments: Return to driving - Driving is NOT permitted until directed by your provider. Under no circumstance are you permitted to drive while using narcotic pain medications.     Order Specific Question Answer Comments   Is discharge order? Yes      Return to athletic activities   Order Comments: Return to athletic activities- Activities such as swimming, bicycling, jogging, running, and stop-and-go sports should be avoided until permitted by your Provider.     Order Specific Question Answer Comments   Is discharge order? Yes      Return to School / Work   Order Comments: Return to School / Work: You may return to work/school when directed by your Provider.     Order Specific Question Answer Comments   Is discharge order? Yes      Weight bearing as tolerated   Order Comments: Weight bearing as tolerated on your operative extremity.     Order Specific Question Answer Comments   Is discharge order? Yes      Dressing / Wound Care - Wound   Order Comments: Leave surgical dressing in place until clinic evaluation in 2 weeks for incision check. Dressing is waterproof but can be reinforced as needed with tegaderm. Contact your Surgeon Team if water gets underneath dressing, you have increased redness, warmth around the surgical wound, and/or drainage from the surgical wound.     Dressing Wound Care - Shower with wound/dressing NOT covered   Order Comments: You do not need to cover your dressing or incision in the shower, you may allow water and soap to run over top of the surgical dressing or incision. Okay to reinforce bandage with saran wrap as needed for  showering. You may shower 2 days after surgery.  You are strictly prohibited from soaking or submerging the surgical wound underwater.     Dressing / Wound Care - NO Tub Bathing   Order Comments: Tub bathing, swimming, or any other activities that will cause your incision to be submerged in water should be avoided until allowed by your Surgeon.     Opioid Instructions (Greater than or equal to 65 years)   Order Comments: You were discharged with an opioid medication (hydromorphone, oxycodone, hydrocodone, or tramadol). This medication should only be taken for breakthrough pain that is not controlled with acetaminophen (TYLENOL). If you rate your pain less than 3 you do not need this medication. Pain rating 0-3: You do not need this medication Pain rating 4-6: Take 1/2 tablet every 4-6 hours as needed Pain rating 7-10: Take 1 tablet every 4-6 hours as needed Do not exceed 4 tablets per day     Medication Instructions - Opioids - Tapering Instructions   Order Comments: In the first three days following surgery, your symptoms may warrant use of the narcotic pain medication every four to six hours as prescribed. This is normal. As your pain symptoms improve, focus your efforts on decreasing (tapering) use of narcotic medications. The most successful tapering strategy is to first, decrease the number of tablets you take every 4-6 hours to the minimum prescribed. Then, increase the amount of time between doses. For example: First, taper to   or 1 tablet every 4-6 hours. Then, taper to   or 1 tablet every 6-8 hours. Then, taper to   or 1 tablet every 8-10 hours. Then, taper to   or 1 tablet every 10-12 hours. Then, taper to   or 1 tablet at bedtime. The bedtime dose can help with comfort during sleep and is typically the last dose to be discontinued after surgery.     NO Precautions   Order Comments: No precautions directed by your Provider.  You may perform range of motion activities as tolerated.     Order Specific  Question Answer Comments   Is discharge order? Yes      Adult [facility] Follow-up and recommended labs and tests   Order Comments: Follow up with PCP in about 1 week.    Appointments on [place] and/or [facility] (with [facility] or [facility] provider or service). Call [phone] if you haven't heard regarding these appointments within 7 days of discharge.     Discharge Instructions   Order Comments: Monitor blood pressure daily. Call PCP for SBP over 180, SBP under 90 or with symptoms.     Medication instructions -  Anticoagulation - aspirin   Order Comments: Take the aspirin as prescribed for a total of four weeks after surgery.  This is given to help minimize your risk of blood clot. After 4 weeks, return to your regular dose of Aspirin as directed by your primary care provider.    *Take  Eliquis 2.5 mg BID starting POD #1 for 2 weeks, followed by ASA 81 mg BID x 2 weeks, then resume home dose of 81 mg ASA daily thereafter.     Cane DME   Order Comments: DME Documentation: Describe the reason for need to support medical necessity: Impaired gait status post knee surgery. I, the undersigned, certify that the above prescribed supplies are medically necessary for this patient and is both reasonable and necessary in reference to accepted standards of medical practice in the treatment of this patient's condition and is not prescribed as a convenience.     Order Specific Question Answer Comments   Medical Equipment (DME) Supplier: [employer]    PATIENT INSTRUCTIONS: If you did not receive this ordered item today, please contact [employer] for availability ([place] Locations: [phone], [place]: [phone]).    Cane Type: Single Tip    Reminder: Patient can typically get 1 every 5 years      Walker DME   Order Comments: DME Documentation: Describe the reason for need to support medical necessity: Impaired gait status post knee surgery. I, the undersigned, certify that the above  prescribed supplies are medically necessary for this patient and is both reasonable and necessary in reference to accepted standards of medical practice in the treatment of this patient's condition and is not prescribed as a convenience.     Order Specific Question Answer Comments   Medical Equipment (DME) Supplier: [employer]    PATIENT INSTRUCTIONS: If you did not receive this ordered item today, please contact [employer] for availability ([place] Locations: [phone], [place]: [phone]).    Walker Type: Standard (2 Wheel)    Accessories: N/A      Discharge Instruction - Regular Diet Adult   Order Comments: Return to your pre-surgery diet unless instructed otherwise     Order Specific Question Answer Comments   Is discharge order? Yes            Primitivo Chu MD  Adult Reconstruction Fellow

## 2024-12-25 ENCOUNTER — MYC MEDICAL ADVICE (OUTPATIENT)
Dept: ORTHOPEDICS | Facility: CLINIC | Age: 69
End: 2024-12-25
Payer: COMMERCIAL

## 2024-12-25 DIAGNOSIS — R60.9 EDEMA, UNSPECIFIED: ICD-10-CM

## 2024-12-25 DIAGNOSIS — Z96.652 S/P TOTAL KNEE ARTHROPLASTY, LEFT: ICD-10-CM

## 2024-12-26 ENCOUNTER — THERAPY VISIT (OUTPATIENT)
Dept: PHYSICAL THERAPY | Facility: CLINIC | Age: 69
End: 2024-12-26
Attending: ORTHOPAEDIC SURGERY
Payer: COMMERCIAL

## 2024-12-26 ENCOUNTER — ANCILLARY PROCEDURE (OUTPATIENT)
Dept: ULTRASOUND IMAGING | Facility: CLINIC | Age: 69
End: 2024-12-26
Payer: COMMERCIAL

## 2024-12-26 DIAGNOSIS — Z96.652 S/P TOTAL KNEE ARTHROPLASTY, LEFT: Primary | ICD-10-CM

## 2024-12-26 DIAGNOSIS — Z96.652 S/P TOTAL KNEE ARTHROPLASTY, LEFT: ICD-10-CM

## 2024-12-26 DIAGNOSIS — Z96.652 STATUS POST TOTAL LEFT KNEE REPLACEMENT: ICD-10-CM

## 2024-12-26 DIAGNOSIS — R60.9 EDEMA, UNSPECIFIED: ICD-10-CM

## 2024-12-26 PROCEDURE — 93971 EXTREMITY STUDY: CPT | Mod: LT

## 2024-12-26 RX ORDER — METHOCARBAMOL 500 MG/1
500 TABLET, FILM COATED ORAL EVERY 6 HOURS PRN
Qty: 20 TABLET | Refills: 0 | Status: SHIPPED | OUTPATIENT
Start: 2024-12-26

## 2024-12-26 RX ORDER — OXYCODONE HYDROCHLORIDE 5 MG/1
5 TABLET ORAL EVERY 4 HOURS PRN
Qty: 26 TABLET | Refills: 0 | Status: SHIPPED | OUTPATIENT
Start: 2024-12-26

## 2024-12-26 ASSESSMENT — ACTIVITIES OF DAILY LIVING (ADL)
HOW_WOULD_YOU_RATE_THE_OVERALL_FUNCTION_OF_YOUR_KNEE_DURING_YOUR_USUAL_DAILY_ACTIVITIES?: NEARLY NORMAL
PLEASE_INDICATE_YOR_PRIMARY_REASON_FOR_REFERRAL_TO_THERAPY:: KNEE
HOW_WOULD_YOU_RATE_THE_OVERALL_FUNCTION_OF_YOUR_KNEE_DURING_YOUR_USUAL_DAILY_ACTIVITIES?: NEARLY NORMAL
AS_A_RESULT_OF_YOUR_KNEE_INJURY,_HOW_WOULD_YOU_RATE_YOUR_CURRENT_LEVEL_OF_DAILY_ACTIVITY?: ABNORMAL
AS_A_RESULT_OF_YOUR_KNEE_INJURY,_HOW_WOULD_YOU_RATE_YOUR_CURRENT_LEVEL_OF_DAILY_ACTIVITY?: ABNORMAL

## 2024-12-26 NOTE — TELEPHONE ENCOUNTER
[facility] denied new Eliquis rx, stating too soon to refill. Called [place] pharmacy and they report that it was filled and sent to unit as discharge medication. Talked to Pt again and she says she spoke with her daughter, Mirna, who confirmed she has been taking this medication as prescribed. She thinks she is done with it, but it should have been prescribed for 2 weeks and Pt is only 8 days post op today. Cancelled Eliquis refill for now.     Called and left  for Mirna to discuss.     Servando Reed, RNCC

## 2024-12-26 NOTE — TELEPHONE ENCOUNTER
S/p Left total knee arthroplasty, DOS:12/18/24    Pt also in building for PT and they are suspicious for DVT of left lower extremity, will.     Pt was prescribed Eliquis post op, but did not pick this (states she wasn't aware). She has been taking one 81 mg ASA daily which is what she was taking prior to surgery. Discussed with Daniella Anton PA-C and we will reorder the Eliquis to start now as previous planned and then transition to 162 mg ASA for an additional 2 weeks. Then transition to one 81 mg ASA daily.     I examined Pt's left lower extremity in clinic. She has 2+ pitting edema throughout leg and into foot. She states she has been elevating above her heart throughout the day. Not compressing (too hard for her to apply the ace wraps). She has pain in her mid calf only with exercises. She does not notice it when resting. No redness throughout calf.     Plan is to get STAT US to rule out DVT. Pt will  her Eliquis from local pharmacy and take as originally planned.   Discussed transitioning to 162 mg ASA when does with this, then back to her 81 mg ASA. Pt agreeable with plan.    oxyCODONE (ROXICODONE) 5 MG tablet (1-2 tabs q4hr PRN)  Last Written Prescription Date:  12/19/24  Last Fill Quantity: 26,   # refills: 0  Future Office visit: 12/31 with SAFIA Lucas RNCC

## 2024-12-26 NOTE — PROGRESS NOTES
"PHYSICAL THERAPY EVALUATION  Type of Visit: Evaluation       Fall Risk Screen:  Fall screen completed by: PT  Have you fallen 2 or more times in the past year?: Yes  Have you fallen and had an injury in the past year?: No  Timed Up and Go score (seconds): Unable to perform today due to recent S/p Left TKA  Is patient a fall risk?: Yes    Subjective         Presenting condition or subjective complaint:    Date of onset: 12/18/24    Relevant medical history:     Dates & types of surgery:      Prior diagnostic imaging/testing results:       Prior therapy history for the same diagnosis, illness or injury:        Prior Level of Function  Transfers:   Ambulation:   ADL:   IADL:     Living Environment  Social support:     Type of home:     Stairs to enter the home:         Ramp:     Stairs inside the home:         Help at home:    Equipment owned:       Employment:      Hobbies/Interests:      Patient goals for therapy:      PHYSICAL THERAPY KNEE EVALUATION    SUBJECTIVE:  Holly is a 69 year old female who reports that her knee is in a lot of pain and she needs pain medication. It hurts in the back of the knee. It's hard to move her leg, but she can pick it up a little bit. She is struggling to sleep. It's hard to stand and get moving    Patient reports symptoms of:  Pain, Range of Motion Loss, Stiffness / Tightness, Weakness, Loss of Balance, and Gait Impairments    Patient report of Pain:  Pain Rating Now: 9/10  Pain Rating at Best: 4/10  Pain Rating at Worst: 10/10  Pain Location: Back of the knee in the calf as well as the front of the left knee. Also sore in the anterior thigh  Pain Quality/Description: Dull pain  Pain Better with: Sitting and Resting. Also pain meds.  Pain Worse with: Movement and Use  Progression of Symptoms: Improving since surgery    Patient reports Red Flags symptoms of:  Calf Swelling/Pain in Calf  History of Blood Clot    OBJECTIVE:  Calf Girth:  Left Calf 5\" below Patella: 15\"  Right Calf 5\" " "below Patella: 13.5\"    Calf Errythemia / Calf Skin Color:  Left Calf: Minimal to No redness in the calf  Right Calf: Minimal to No redness in the calf    Calf Pain with Palpation:  Left Calf: Pain reported with palpation of mid-distal calf  Right Calf: No pain reports with palpation    Calf Pitting Edema:  Left Calf: 3+ pitting edema in left calf / left ankle  Right Calf: 0 pitting edema in right calf / right ankle    Well's Prediction Rule:  3 High (+1 Major Surgery, +1 for Localized Tenderness, +1 Entire LE Swelling, +1 for >3 cm swelling, +1 for Pitting Edema, -2 for other possible diagnosis (Post-Operative Swelling)    Seated Lower Extremity Manual Muscle Test / Strength Assessment:  Knee Extension: Right Knee 5/5, Left Knee 2/5  Knee Flexion: Right Knee 5/5, Left Knee 2/5  Quad Set: Right Knee - Good, Left Knee - Poor    Supine Knee Heelslide ROM  Right Knee:   Active Knee Flexion: 110 degrees  Active Knee Extension 0 degrees  Passive Knee Flexion: 120 degrees  Passive Knee Flexion: 0    Left Knee:  Active Knee Flexion: 67 degrees  Active Knee Extension: -3 degrees  Passive Knee Flexion: 79  Passive Knee Extension after Stretchin degrees       ASSESSMENT:  Holly is a 69 year old female referred to Physical Therapy for S/p L TKA   from [facility].  Holly demonstrates findings of Pain, Weakness, Motion Loss, Loss of Function, Balance Deficits and Fall Risk, and Gait Deficits that justify a need for formal Physical Therapy. These impairments interfere with their ability to perform self care tasks, work tasks, recreational activities, household chores, driving , household mobility, and community mobility as compared to their previous level of function.    Medical Diagnosis: S/p L TKA    Treatment Diagnosis: S/p L TKA     Clinical Decision Making (Complexity):  Clinical Presentation: Evolving/Changing  Clinical Presentation Rationale: based on medical and personal factors listed in PT evaluation  Clinical " Decision Making (Complexity): Moderate complexity      PHYSICAL THERAPY PLAN OF CARE:  Treatment Interventions:  Modalities: Cryotherapy, E-stim, Hot Pack, Ultrasound  Interventions: Gait Training, Manual Therapy, Neuromuscular Re-education, Therapeutic Activity, Therapeutic Exercise    Long Term Goals     PT Goal 1  Goal Identifier: ROM  Goal Description: Steffanys knee ROM will increase 0 -110 degrees of motion  Rationale: to maximize safety and independence with performance of ADLs and functional tasks;to maximize safety and independence within the home;to maximize safety and independence within the community;to maximize safety and independence with self cares;to maximize safety and independence with transportation  Goal Progress: -3 to 67  Target Date: 02/20/25  PT Goal 2  Goal Identifier: Ambulation  Goal Description: Holly will be able to ambulate without a walker using an SEC or no AD  Rationale: to maximize safety and independence with performance of ADLs and functional tasks;to maximize safety and independence within the home;to maximize safety and independence within the community;to maximize safety and independence with transportation;to maximize safety and independence with self cares  Goal Progress: Currently ambulates with 4WW  Target Date: 01/23/25  PT Goal 3  Goal Identifier: Stairs and Squats  Goal Description: Holly will be able to navigate up and down stairs with reciprocal pattern without pain and perform a sit to stand without upper extremity assistance  Rationale: to maximize safety and independence with performance of ADLs and functional tasks;to maximize safety and independence within the home;to maximize safety and independence within the community;to maximize safety and independence with transportation;to maximize safety and independence with self cares  Goal Progress: Unable at this time  Target Date: 03/20/25    Frequency of Treatment: 2x a week  Duration of Treatment: 12 weeks          Risks and benefits of evaluation/treatment have been explained.   Patient/Family/caregiver agrees with Plan of Care.      Evaluation Time:     PT Fabyal, Moderate Complexity Minutes (98844): 20         Signing Clinician: Gigi Jaramillo PT        SUMMARY OF PLAN OF CARE:  Holly presents S/p Left Total Knee Arthroplasty by Dr. Sheehan on 12/18/2024.  She arrives with primary complaints of posterior knee and posterior calf pain, left calf pitting edema 1.5 inches larger than the contralateral side, and a history of a DVT. Due to her pain, she admits to minimal movement and spending a lot of time sitting in a chair.  Her swelling could be typical post-operative pain that is peripheralizing as she sits sedentary in a chair, but out of precaution we recommend Ultrasound imaging for a DVT.    Once cleared of a DVT, we will followed standard post-operative TKA protocol with emphasis on ROM, pain management, strengthening and gait training.          [facility]                                                                                   OUTPATIENT PHYSICAL THERAPY      PLAN OF TREATMENT FOR OUTPATIENT REHABILITATION   Patient's Last Name, First Name, Holly Rahman YOB: 1955   Provider's Name   [facility]   Medical Record No.  [id number]     Onset Date: 12/18/24  Start of Care Date: 12/26/24     Medical Diagnosis:  S/p L TKA      PT Treatment Diagnosis:  S/p L TKA Plan of Treatment  Frequency/Duration: 2x a week/ 12 weeks    Certification date from 12/26/24 to 03/20/25         See note for plan of treatment details and functional goals     Gigi Jaramillo, PT                         I CERTIFY THE NEED FOR THESE SERVICES FURNISHED UNDER        THIS PLAN OF TREATMENT AND WHILE UNDER MY CARE     (Physician attestation of this document indicates review and certification of the therapy plan).              Referring Provider:  [facility]  [place]    Initial Assessment  See Epic Evaluation- Start of Care Date: 12/26/24

## 2024-12-31 ENCOUNTER — ANCILLARY PROCEDURE (OUTPATIENT)
Dept: GENERAL RADIOLOGY | Facility: CLINIC | Age: 69
End: 2024-12-31
Attending: PHYSICIAN ASSISTANT
Payer: COMMERCIAL

## 2024-12-31 ENCOUNTER — PATIENT OUTREACH (OUTPATIENT)
Dept: CARE COORDINATION | Facility: CLINIC | Age: 69
End: 2024-12-31

## 2024-12-31 ENCOUNTER — OFFICE VISIT (OUTPATIENT)
Dept: ORTHOPEDICS | Facility: CLINIC | Age: 69
End: 2024-12-31
Payer: COMMERCIAL

## 2024-12-31 DIAGNOSIS — Z96.652 S/P TOTAL KNEE ARTHROPLASTY, LEFT: Primary | ICD-10-CM

## 2024-12-31 DIAGNOSIS — Z96.652 S/P TOTAL KNEE ARTHROPLASTY, LEFT: ICD-10-CM

## 2024-12-31 PROCEDURE — 73562 X-RAY EXAM OF KNEE 3: CPT | Mod: LT | Performed by: STUDENT IN AN ORGANIZED HEALTH CARE EDUCATION/TRAINING PROGRAM

## 2024-12-31 PROCEDURE — 99024 POSTOP FOLLOW-UP VISIT: CPT | Performed by: PHYSICIAN ASSISTANT

## 2024-12-31 RX ORDER — OXYCODONE HYDROCHLORIDE 5 MG/1
5 TABLET ORAL EVERY 4 HOURS PRN
Qty: 26 TABLET | Refills: 0 | Status: SHIPPED | OUTPATIENT
Start: 2024-12-31

## 2024-12-31 RX ORDER — METHOCARBAMOL 500 MG/1
500 TABLET, FILM COATED ORAL EVERY 6 HOURS PRN
Qty: 20 TABLET | Refills: 0 | Status: SHIPPED | OUTPATIENT
Start: 2024-12-31

## 2024-12-31 NOTE — LETTER
12/31/2024      Holly Muir  [id number] [facility] [address]      Dear Colleague,    Thank you for referring your patient, Holly Muir, to the [facility]. Please see a copy of my visit note below.    Chief Complaint:   Follow up, DOS 12/18/24 with Dr. Sheehan    Procedures:  Left total knee arthroplasty    History:  Holly Muir is a 69 year old who presents for routine postoperative evaluation of her knee. She is accompanied by her daughter, Mirna. She reports recovery on the knee is overall going well. She is still struggling with pain at night primarily, but can go without opioids during the daytime hours without much difficulty. She is also taking methocarbamol which was prescribed from the hospital. She is on eliquis 2.5mg BID x 14 days for DVT prophylaxis and will transition to  ASA 162mg daily x 2 more weeks thereafter for history of PE. She is working with PT at our clinic here in [place]. She will be starting twice weekly visits next week.    Denies fevers, chills or sweats. Denies calf pain or tenderness, chest pain or shortness of breath.     Exam:    General: Awake, Alert, and oriented. Articulates and communicates with a normal affect     Left Lower Extremity:  Incision is well approximated and healing without evidence of infection, no erythema, drainage, or wound dehiscence. Steris removed and new ones applied after cleansing.   Normal post-operative effusion and ecchymosis  Range of motion: 5-95, smooth tracking of patella  Stable to varus and valgus stress  Stable in sagittal plane  TA/Gsc/EHL/FHL with 5/5 strength  Distal pulses palpable, toes are warm and well perfused   Gait: ambulating independently with a walker    Imaging:  Radiographs of the left knee were ordered including AP, lateral and sunrise views of the patella and were independently reviewed by me and findings were discussed with the patient today. The imaging demonstrates total  knee arthroplasty well-aligned without evidence for lucency or hardware complication.     Medications:     Current Outpatient Medications:      methocarbamol (ROBAXIN) 500 MG tablet, Take 1 tablet (500 mg) by mouth every 6 hours as needed for muscle spasms., Disp: 20 tablet, Rfl: 0     oxyCODONE (ROXICODONE) 5 MG tablet, Take 1 tablet (5 mg) by mouth every 4 hours as needed for severe pain., Disp: 26 tablet, Rfl: 0     acetaminophen (TYLENOL) 325 MG tablet, Take 2 tablets (650 mg) by mouth every 4 hours as needed for other (mild pain)., Disp: 100 tablet, Rfl: 0     aspirin (ASPIRIN LOW DOSE) 81 MG chewable tablet, TAKE 1 TABLET BY MOUTH EVERY DAY, Disp: 90 tablet, Rfl: 0     aspirin 81 MG EC tablet, Take 1 tablet (81 mg) by mouth 2 times daily for 14 days., Disp: 28 tablet, Rfl: 0     aspirin 81 MG EC tablet, Take 1 tablet (81 mg) by mouth 2 times daily., Disp: 60 tablet, Rfl: 0     blood glucose monitoring (NO BRAND SPECIFIED) meter device kit, Use to test blood sugar 4  times daily or as directed. Accuceck Guide meter, Disp: 1 kit, Rfl: 1     blood glucose monitoring (ONE TOUCH ULTRA 2) meter device kit, USE TO TEST BLOOD SUGAR 4 TIMES DAILY OR AS DIRECTED. ACCUCECK GUIDE METER, Disp: , Rfl:      blood glucose monitoring (ULTRA THIN 30G) lancets, Use to test blood sugar 4  times daily or as directed.lancets for acchu check guide, Disp: 400 each, Rfl: 3     buPROPion (WELLBUTRIN SR) 200 MG 12 hr tablet, Take 1 tablet (200 mg) by mouth daily (with breakfast)., Disp: 30 tablet, Rfl: 5     busPIRone HCl (BUSPAR) 30 MG tablet, TAKE 1 TABLET BY MOUTH TWICE A DAY, Disp: 60 tablet, Rfl: 5     carboxymethylcellulose (REFRESH PLUS) 0.5 % SOLN ophthalmic solution, PLACE 1 DROP INTO BOTH EYES 2 TIMES DAILY AS NEEDED FOR DRY EYES., Disp: 30 mL, Rfl: 11     cariprazine (VRAYLAR) 4.5 MG capsule, Take 1 capsule (4.5 mg) by mouth daily., Disp: 30 capsule, Rfl: 5     cetirizine (ZYRTEC) 10 MG tablet, TAKE 1 TABLET (10 MG) BY MOUTH  DAILY., Disp: 90 tablet, Rfl: 1     Continuous Glucose  (FREESTYLE CM 2 READER) GAL[place], 1 DOSE CONTINUOUS, Disp: 1 each, Rfl: 0     Continuous Glucose Sensor (FREESTYLE CM 2 SENSOR) [facility], Use one sensor every 14 days to read blood sugar, Disp: 9 each, Rfl: 3     ferrous gluconate (FERGON) 324 (38 Fe) MG tablet, TAKE 1 TABLET (324 MG) BY MOUTH DAILY (WITH BREAKFAST), Disp: 90 tablet, Rfl: 0     gabapentin (NEURONTIN) 600 MG tablet, TAKE 2 TABLETS (1,200 MG) BY MOUTH 3 TIMES DAILY FOR 30 DAYS, Disp: 180 tablet, Rfl: 5     insulin glargine (LANTUS SOLOSTAR) 100 UNIT/ML pen, INJECT 20 UNITS AT BEDTIME AS DIRECTED + 2 UNITS FOR PRIMING OF PEN., Disp: 30 mL, Rfl: 3     insulin pen needle (31G X 5 MM) 31G X 5 MM miscellaneous, Use four times 4 day times a day, Disp: 200 each, Rfl: 3     metFORMIN (GLUCOPHAGE XR) 500 MG 24 hr tablet, Take 2 tablets (1,000 mg) by mouth 2 times daily (with meals) Take two tablets with breakfast and one tablet with dinner for one week then increase to two tablets with breakfast and two tablets with dinner thereafter, Disp: 360 tablet, Rfl: 3     mirtazapine (REMERON) 7.5 MG tablet, Take 1 tablet (7.5 mg) by mouth at bedtime., Disp: 30 tablet, Rfl: 5     MOUNJARO 12.5 MG/0.5ML SOAJ, Inject 0.5 mLs (12.5 mg) subcutaneously every 7 days., Disp: 2 mL, Rfl: 0     Multiple Vitamin (DAILY-ISAAC MULTIVITAMIN) TABS, TAKE 1 TABLET BY MOUTH EVERY DAY, Disp: 90 tablet, Rfl: 0     nystatin (MYCOSTATIN) 411978 UNIT/GM external powder, Apply 1 dose topically 3 times daily as needed, Disp: 60 g, Rfl: 3     omeprazole (PRILOSEC) 20 MG DR capsule, TAKE 2 CAPSULES (40 MG) BY MOUTH DAILY *TAKE 30-60 MINUTES BEFORE A MEAL*, Disp: 180 capsule, Rfl: 2     ondansetron (ZOFRAN ODT) 4 MG ODT tab, Place 4 mg under the tongue., Disp: , Rfl:      ONETOUCH ULTRA test strip, USE TO TEST BLOOD SUGAR 4 TIMES DAILY OR AS DIRECTED. ACCU CHECK GUIDE STRIPS, Disp: 400 strip, Rfl: 3     PARoxetine (PAXIL) 40 MG tablet,  Take 1 tablet (40 mg) by mouth every evening., Disp: 30 tablet, Rfl: 5     pioglitazone (ACTOS) 45 MG tablet, Take 1 tablet (45 mg) by mouth daily, Disp: 90 tablet, Rfl: 1     polyethylene glycol (MIRALAX) 17 GM/Dose powder, Take 17 g by mouth daily., Disp: 510 g, Rfl: 0     propranolol ER (INDERAL LA) 80 MG 24 hr capsule, TAKE 1 CAPSULE BY MOUTH EVERY DAY, Disp: 90 capsule, Rfl: 2     rOPINIRole (REQUIP) 0.25 MG tablet, TAKE 1 TABLET (0.25 MG) BY MOUTH EVERY EVENING, Disp: 90 tablet, Rfl: 0     senna-docusate (SENOKOT-S/PERICOLACE) 8.6-50 MG tablet, Take 1-2 tablets by mouth 2 times daily as needed for constipation. Take while on oral narcotics to prevent or treat constipation., Disp: 30 tablet, Rfl: 0     simvastatin (ZOCOR) 20 MG tablet, TAKE 1 TABLET BY MOUTH EVERYDAY AT BEDTIME, Disp: 90 tablet, Rfl: 0     traZODone (DESYREL) 50 MG tablet, Take 50 mg by mouth., Disp: , Rfl:      vitamin C ([employer] VITAMIN C) 500 MG tablet, 1/2 TABLET BY MOUTH JACOBSON WITH IRON SUPPLEMENT BEFORE MEAL, Disp: 45 tablet, Rfl: 3     VITAMIN D3 25 MCG (1000 UT) tablet, TAKE 1 TABLET BY MOUTH EVERY DAY, Disp: 90 tablet, Rfl: 3    Assessment:  Doing well 2 weeks s/p left total knee arthroplasty      Plan:   - Basic wound cares were performed today.  She was directed to refrain from submerging in a tub or a pool until incision is a well-healed scar. No lotions or ointments on incision while healing. Directed to call with any drainage or redness about the incision area.   - Encouraged consistent work with PT on gaining motion, mobility and strength. Discussed importance of regaining extension in early postoperative phase and goal of at least 90 degrees of flexion by 4 weeks from surgery.   -Reviewed signs and symptoms of DVT/PE including calf pain or tenderness, chest pain or shortness of breath and directed them to report to call in to clinic or report to ED with any concerns or if they symptoms occur.   -DVT prophylaxis: Eliquis 2.5mg BID x  14 days followed by ASA 81mg BID x 14 days  -Follow up: 6 weeks with Dr. Sheehan, no new x-rays needed.      Shayy Scott PA-C   12/31/2024 11:53 AM  Orthopedic Surgery      Again, thank you for allowing me to participate in the care of your patient.        Sincerely,        Shayy Scott PA-C    Electronically signed

## 2024-12-31 NOTE — PROGRESS NOTES
Chief Complaint:   Follow up, DOS 12/18/24 with Dr. Sheehan    Procedures:  Left total knee arthroplasty    History:  Holly Muir is a 69 year old who presents for routine postoperative evaluation of her knee. She is accompanied by her daughter, Mirna. She reports recovery on the knee is overall going well. She is still struggling with pain at night primarily, but can go without opioids during the daytime hours without much difficulty. She is also taking methocarbamol which was prescribed from the hospital. She is on eliquis 2.5mg BID x 14 days for DVT prophylaxis and will transition to  ASA 162mg daily x 2 more weeks thereafter for history of PE. She is working with PT at our clinic here in [place]. She will be starting twice weekly visits next week.    Denies fevers, chills or sweats. Denies calf pain or tenderness, chest pain or shortness of breath.     Exam:    General: Awake, Alert, and oriented. Articulates and communicates with a normal affect     Left Lower Extremity:  Incision is well approximated and healing without evidence of infection, no erythema, drainage, or wound dehiscence. Steris removed and new ones applied after cleansing.   Normal post-operative effusion and ecchymosis  Range of motion: 5-95, smooth tracking of patella  Stable to varus and valgus stress  Stable in sagittal plane  TA/Gsc/EHL/FHL with 5/5 strength  Distal pulses palpable, toes are warm and well perfused   Gait: ambulating independently with a walker    Imaging:  Radiographs of the left knee were ordered including AP, lateral and sunrise views of the patella and were independently reviewed by me and findings were discussed with the patient today. The imaging demonstrates total knee arthroplasty well-aligned without evidence for lucency or hardware complication.     Medications:     Current Outpatient Medications:     methocarbamol (ROBAXIN) 500 MG tablet, Take 1 tablet (500 mg) by mouth every 6 hours as needed for muscle  spasms., Disp: 20 tablet, Rfl: 0    oxyCODONE (ROXICODONE) 5 MG tablet, Take 1 tablet (5 mg) by mouth every 4 hours as needed for severe pain., Disp: 26 tablet, Rfl: 0    acetaminophen (TYLENOL) 325 MG tablet, Take 2 tablets (650 mg) by mouth every 4 hours as needed for other (mild pain)., Disp: 100 tablet, Rfl: 0    aspirin (ASPIRIN LOW DOSE) 81 MG chewable tablet, TAKE 1 TABLET BY MOUTH EVERY DAY, Disp: 90 tablet, Rfl: 0    aspirin 81 MG EC tablet, Take 1 tablet (81 mg) by mouth 2 times daily for 14 days., Disp: 28 tablet, Rfl: 0    aspirin 81 MG EC tablet, Take 1 tablet (81 mg) by mouth 2 times daily., Disp: 60 tablet, Rfl: 0    blood glucose monitoring (NO BRAND SPECIFIED) meter device kit, Use to test blood sugar 4  times daily or as directed. Accuceck Guide meter, Disp: 1 kit, Rfl: 1    blood glucose monitoring (ONE TOUCH ULTRA 2) meter device kit, USE TO TEST BLOOD SUGAR 4 TIMES DAILY OR AS DIRECTED. ACCUCECK GUIDE METER, Disp: , Rfl:     blood glucose monitoring (ULTRA THIN 30G) lancets, Use to test blood sugar 4  times daily or as directed.lancets for acchu check guide, Disp: 400 each, Rfl: 3    buPROPion (WELLBUTRIN SR) 200 MG 12 hr tablet, Take 1 tablet (200 mg) by mouth daily (with breakfast)., Disp: 30 tablet, Rfl: 5    busPIRone HCl (BUSPAR) 30 MG tablet, TAKE 1 TABLET BY MOUTH TWICE A DAY, Disp: 60 tablet, Rfl: 5    carboxymethylcellulose (REFRESH PLUS) 0.5 % SOLN ophthalmic solution, PLACE 1 DROP INTO BOTH EYES 2 TIMES DAILY AS NEEDED FOR DRY EYES., Disp: 30 mL, Rfl: 11    cariprazine (VRAYLAR) 4.5 MG capsule, Take 1 capsule (4.5 mg) by mouth daily., Disp: 30 capsule, Rfl: 5    cetirizine (ZYRTEC) 10 MG tablet, TAKE 1 TABLET (10 MG) BY MOUTH DAILY., Disp: 90 tablet, Rfl: 1    Continuous Glucose  (FREESTYLE CM 2 READER) [place], 1 DOSE CONTINUOUS, Disp: 1 each, Rfl: 0    Continuous Glucose Sensor (FREESTYLE CM 2 SENSOR) [facility], Use one sensor every 14 days to read blood sugar, Disp: 9 each,  Rfl: 3    ferrous gluconate (FERGON) 324 (38 Fe) MG tablet, TAKE 1 TABLET (324 MG) BY MOUTH DAILY (WITH BREAKFAST), Disp: 90 tablet, Rfl: 0    gabapentin (NEURONTIN) 600 MG tablet, TAKE 2 TABLETS (1,200 MG) BY MOUTH 3 TIMES DAILY FOR 30 DAYS, Disp: 180 tablet, Rfl: 5    insulin glargine (LANTUS SOLOSTAR) 100 UNIT/ML pen, INJECT 20 UNITS AT BEDTIME AS DIRECTED + 2 UNITS FOR PRIMING OF PEN., Disp: 30 mL, Rfl: 3    insulin pen needle (31G X 5 MM) 31G X 5 MM miscellaneous, Use four times 4 day times a day, Disp: 200 each, Rfl: 3    metFORMIN (GLUCOPHAGE XR) 500 MG 24 hr tablet, Take 2 tablets (1,000 mg) by mouth 2 times daily (with meals) Take two tablets with breakfast and one tablet with dinner for one week then increase to two tablets with breakfast and two tablets with dinner thereafter, Disp: 360 tablet, Rfl: 3    mirtazapine (REMERON) 7.5 MG tablet, Take 1 tablet (7.5 mg) by mouth at bedtime., Disp: 30 tablet, Rfl: 5    MOUNJARO 12.5 MG/0.5ML SOAJ, Inject 0.5 mLs (12.5 mg) subcutaneously every 7 days., Disp: 2 mL, Rfl: 0    Multiple Vitamin (DAILY-ISAAC MULTIVITAMIN) TABS, TAKE 1 TABLET BY MOUTH EVERY DAY, Disp: 90 tablet, Rfl: 0    nystatin (MYCOSTATIN) 426569 UNIT/GM external powder, Apply 1 dose topically 3 times daily as needed, Disp: 60 g, Rfl: 3    omeprazole (PRILOSEC) 20 MG DR capsule, TAKE 2 CAPSULES (40 MG) BY MOUTH DAILY *TAKE 30-60 MINUTES BEFORE A MEAL*, Disp: 180 capsule, Rfl: 2    ondansetron (ZOFRAN ODT) 4 MG ODT tab, Place 4 mg under the tongue., Disp: , Rfl:     ONETOUCH ULTRA test strip, USE TO TEST BLOOD SUGAR 4 TIMES DAILY OR AS DIRECTED. ACCU CHECK GUIDE STRIPS, Disp: 400 strip, Rfl: 3    PARoxetine (PAXIL) 40 MG tablet, Take 1 tablet (40 mg) by mouth every evening., Disp: 30 tablet, Rfl: 5    pioglitazone (ACTOS) 45 MG tablet, Take 1 tablet (45 mg) by mouth daily, Disp: 90 tablet, Rfl: 1    polyethylene glycol (MIRALAX) 17 GM/Dose powder, Take 17 g by mouth daily., Disp: 510 g, Rfl: 0     propranolol ER (INDERAL LA) 80 MG 24 hr capsule, TAKE 1 CAPSULE BY MOUTH EVERY DAY, Disp: 90 capsule, Rfl: 2    rOPINIRole (REQUIP) 0.25 MG tablet, TAKE 1 TABLET (0.25 MG) BY MOUTH EVERY EVENING, Disp: 90 tablet, Rfl: 0    senna-docusate (SENOKOT-S/PERICOLACE) 8.6-50 MG tablet, Take 1-2 tablets by mouth 2 times daily as needed for constipation. Take while on oral narcotics to prevent or treat constipation., Disp: 30 tablet, Rfl: 0    simvastatin (ZOCOR) 20 MG tablet, TAKE 1 TABLET BY MOUTH EVERYDAY AT BEDTIME, Disp: 90 tablet, Rfl: 0    traZODone (DESYREL) 50 MG tablet, Take 50 mg by mouth., Disp: , Rfl:     vitamin C ([employer] VITAMIN C) 500 MG tablet, 1/2 TABLET BY MOUTH JACOBSON WITH IRON SUPPLEMENT BEFORE MEAL, Disp: 45 tablet, Rfl: 3    VITAMIN D3 25 MCG (1000 UT) tablet, TAKE 1 TABLET BY MOUTH EVERY DAY, Disp: 90 tablet, Rfl: 3    Assessment:  Doing well 2 weeks s/p left total knee arthroplasty      Plan:   - Basic wound cares were performed today.  She was directed to refrain from submerging in a tub or a pool until incision is a well-healed scar. No lotions or ointments on incision while healing. Directed to call with any drainage or redness about the incision area.   - Encouraged consistent work with PT on gaining motion, mobility and strength. Discussed importance of regaining extension in early postoperative phase and goal of at least 90 degrees of flexion by 4 weeks from surgery.   -Reviewed signs and symptoms of DVT/PE including calf pain or tenderness, chest pain or shortness of breath and directed them to report to call in to clinic or report to ED with any concerns or if they symptoms occur.   -DVT prophylaxis: Eliquis 2.5mg BID x 14 days followed by ASA 81mg BID x 14 days  -Follow up: 6 weeks with Dr. Sheehan, no new x-rays needed.      Shayy Scott PA-C   12/31/2024 11:53 AM  Orthopedic Surgery

## 2024-12-31 NOTE — NURSING NOTE
Holly Muir is here for:     Chief Complaint   Patient presents with    RECHECK     2 weeks s/p left total knee arthroplasty DOS: 12/18/24 - night is painful.        Pain: 8/10  Taking the following medications from surgery: Oxycodone, methocarbamol, acetaminophen  Needs refill on: oxycodone  Physical therapy clinic: [facility], [facility]

## 2025-01-05 DIAGNOSIS — K21.9 GASTROESOPHAGEAL REFLUX DISEASE WITHOUT ESOPHAGITIS: ICD-10-CM

## 2025-01-06 ENCOUNTER — THERAPY VISIT (OUTPATIENT)
Dept: PHYSICAL THERAPY | Facility: CLINIC | Age: 70
End: 2025-01-06
Payer: COMMERCIAL

## 2025-01-06 DIAGNOSIS — Z96.652 S/P TOTAL KNEE ARTHROPLASTY, LEFT: Primary | ICD-10-CM

## 2025-01-06 PROCEDURE — 97110 THERAPEUTIC EXERCISES: CPT | Mod: GP | Performed by: PHYSICAL THERAPIST

## 2025-01-06 PROCEDURE — 97140 MANUAL THERAPY 1/> REGIONS: CPT | Mod: GP | Performed by: PHYSICAL THERAPIST

## 2025-01-09 NOTE — PROGRESS NOTES
Dr. Zavala at bedside and adjusted dressing which was pinching right ear and she called Dr. Wells who came to the bedside with Dr. Betancur. Dr. Wells, loosened the dressing by cutting a little bit on the gauze dressing.   [facility] utilizes an encounter to take the place of a direct phone call to your office. Please take a moment to review the below request. Please reply or route message to author of this encounter.  Message will act as a verbal OK of orders requested below. Thank you.    ORDER  Requesting recertification orders for weekly SNV and PT eval due to recent falls    MD SUMMARY/PLAN OF CARE     MD Summary-Recert  Pt is a 63 yr old female who continues with [facility] Services for Disease Management/Education and assistance with weekly med set up and assessment of compliance and refills. She currently resides in a single family home with her daughter/son in law and grandchildren. Family assists PRN, client cares for her grandchildren most days. She takes medical transport to all appointments or family will take if she is not able to arrange transport. She continues to attend medical appts as scheduled and updates SN of any medication changes, she has demonstrated improved med compliance with a rare missed dose.  Her MH status has improved, she denies further hallucinations since increased abilify dose.   Pt has ostomy which she manages independently, pt also has implant  which has resulted in her remaining continent of urine. She reports chronic back pain with no changes in regimen the last 60 days.  She had 3 falls this last cert period first one 5/11/18 and again on 6/1/18.  on 6/18/18 client was attempting to get out of bed and her feet got tangled in her blanket and she fell hitting her head on the headboard resulting in a laceration over the right eye measuring 3cmx0.2cm, she did not seek medical attention..  She falls going up the stairs, despite recommnedations and request from SN family has not yet put hand rails on the stairs. She typically will end up falling forward and onto her knees, on 6/1 she sustained an abrasion on her right knee which is scabbed and FEI without signs of infection measuring  2.5cmx3.5cm.  A/O x 3 with forgetfulness, speech is clear, she is pleasant and cooperative with in home nurse visits.  Pt is following Psych doctor Tino at [facility] and  [facility]. Pt is also seeing a therapist and pt states this has helped with depression. Education on ADA diet and carb counting,  She follows up with endocrinology  as directed and her DM is being managed by DM education, she does not currently have follow up with DM Ed.  Her lantus was changed this last 60 days to Basaglar due to insurance coverage, her blood sugar readings have improved with this change, client is making small dietary changes each week, but struggles to follow ADA diet; written instruction, verbal reminders provided to client on food choices.  Due to her cultural food choices it is unlikely client will demonstrate compliance with ADA dietary recommendations.  She has been requested to create a food diary and declines to do so.  Client has verbalized awareness of elevated blood sugars due to poor food choices.     BACKGROUND//Primary medical diagnosis is Severe major depression with psychotic features, pt also suffers from occasional memory loss and is Type 2 diabetic.  ANALYSIS// High risk for hospitalization r/t poorly controlled diabetes and history of noncompliance/forgetfulness with taking high risk medications correctly.  She will require skilled interventions to manage her health at home. RECOMMENDATION// Recommend SN visits for Medication management/education, pt/CG education on ADA diet, skin integrity assessments/teaching.  Chronic pain management/education. She will require skilled interventions to manage her health at home. Expected length of home care is  ongoing HC need for med mgmt.  PT eval for balance, fall prevention, safety     Patient/caregiver educated on security, storage and disposal of controlled substances per handout.    Emergency Plan created and reviewed with patient and left in home    Patient  Identified Goal// To take all my pills so I dont have hallucinations, to not fall going up the stairs

## 2025-01-11 ENCOUNTER — OFFICE VISIT (OUTPATIENT)
Dept: URGENT CARE | Facility: URGENT CARE | Age: 70
End: 2025-01-11
Payer: COMMERCIAL

## 2025-01-11 ENCOUNTER — ANCILLARY PROCEDURE (OUTPATIENT)
Dept: GENERAL RADIOLOGY | Facility: CLINIC | Age: 70
End: 2025-01-11
Attending: PHYSICIAN ASSISTANT
Payer: COMMERCIAL

## 2025-01-11 VITALS
BODY MASS INDEX: 32.92 KG/M2 | OXYGEN SATURATION: 100 % | WEIGHT: 180 LBS | DIASTOLIC BLOOD PRESSURE: 76 MMHG | TEMPERATURE: 98.2 F | RESPIRATION RATE: 20 BRPM | SYSTOLIC BLOOD PRESSURE: 129 MMHG | HEART RATE: 85 BPM

## 2025-01-11 DIAGNOSIS — J02.9 SORE THROAT: ICD-10-CM

## 2025-01-11 DIAGNOSIS — Z20.818 STREPTOCOCCUS EXPOSURE: ICD-10-CM

## 2025-01-11 DIAGNOSIS — M25.562 ACUTE PAIN OF LEFT KNEE: Primary | ICD-10-CM

## 2025-01-11 DIAGNOSIS — Z96.652 S/P TOTAL KNEE ARTHROPLASTY, LEFT: ICD-10-CM

## 2025-01-11 DIAGNOSIS — M25.562 ACUTE PAIN OF LEFT KNEE: ICD-10-CM

## 2025-01-11 LAB — DEPRECATED S PYO AG THROAT QL EIA: NEGATIVE

## 2025-01-11 PROCEDURE — 87651 STREP A DNA AMP PROBE: CPT | Performed by: PHYSICIAN ASSISTANT

## 2025-01-11 PROCEDURE — 73562 X-RAY EXAM OF KNEE 3: CPT | Mod: TC | Performed by: RADIOLOGY

## 2025-01-11 PROCEDURE — 99213 OFFICE O/P EST LOW 20 MIN: CPT | Performed by: PHYSICIAN ASSISTANT

## 2025-01-11 RX ORDER — ACETAMINOPHEN 500 MG
1000 TABLET ORAL ONCE
Status: COMPLETED | OUTPATIENT
Start: 2025-01-11 | End: 2025-01-11

## 2025-01-11 RX ADMIN — Medication 1000 MG: at 15:48

## 2025-01-11 NOTE — PROGRESS NOTES
Assessment & Plan     Acute pain of left knee  - acetaminophen (TYLENOL) tablet 1,000 mg  - XR Knee Left 3 Views; Future    S/P total knee arthroplasty, left  - XR Knee Left 3 Views; Future    Sore throat  - Streptococcus A Rapid Screen w/Reflex to PCR - Clinic Collect  - Group A Streptococcus PCR Throat Swab    Streptococcus exposure  - Streptococcus A Rapid Screen w/Reflex to PCR - Clinic Collect  - Group A Streptococcus PCR Throat Swab    Normal alignment of knee and hardware by my read, awaiting read by radiology. Rest and ice knee, add tylenol for pain. Strep negative. Fluids and rest. Be seen if symptoms worsen.     Return in about 1 week (around 1/18/2025) for visit with primary care provider if not improving.     Addis Reed PA-C  [facility]    Subjective   Holly Muir is a 69 year old who presents for the following health issues     Patient presents with:  Urgent Care: c/o severe left knee pain (10/10 pain) - reports she had knee surgery on 12/18/2024.  c/o sore throat, nauseous, chills and clammy - reports strep throat is going around in her home.       MARIA VICTORIA Barrera presents clinic today for evaluation of a sore throat and knee pain.  She had a left knee replacement on 12/18/2024.  She has been struggling with her pain since.  3 days ago, her grandson helped her to stand up from the couch and she felt something pulling in her lateral thigh.  She has had increased pain in her knee ever since.  She does not have increased swelling, redness, or warmth from baseline.  No decreased range of motion.     She notes that today, she started feeling chills and clammy with a sore throat.  No recorded fever.  Her grandson does currently have strep.    She does not know what medications she is taking, Her kids set up her pills for her and give them to her.    Review of Systems   ROS negative except as stated above.      Objective    /76   Pulse 85   Temp 98.2  F (36.8  C)  (Tympanic)   Resp 20   Wt 81.6 kg (180 lb)   SpO2 100%   BMI 32.92 kg/m    Physical Exam   GENERAL: alert and no distress  EYES: Eyes grossly normal to inspection, PERRL and conjunctivae and sclerae normal  HENT: ear canals and TM's normal, nose and mouth without ulcers or lesions  NECK: no adenopathy, no asymmetry, masses, or scars  RESP: lungs clear to auscultation - no rales, rhonchi or wheezes  CV: regular rate and rhythm, normal S1 S2, no S3 or S4, no murmur, click or rub, no peripheral edema  MS: no gross musculoskeletal defects noted, no edema  SKIN:Left knee surgical incision, healing. Swelling and warm to the touch, no significant erythema. See photo below.    Results for orders placed or performed in visit on 01/11/25   Streptococcus A Rapid Screen w/Reflex to PCR - Clinic Collect     Status: Normal    Specimen: Throat; Swab   Result Value Ref Range    Group A Strep antigen Negative Negative

## 2025-01-11 NOTE — PATIENT INSTRUCTIONS
Rest-activity as tolerated  Ice your injury for 10 to 15 minutes 3-4 times a day  Elevate, above the level of your heart, whenever possible  Tylenol 650mg every 6 hours needed for pain

## 2025-01-12 LAB — S PYO DNA THROAT QL NAA+PROBE: NOT DETECTED

## 2025-01-13 DIAGNOSIS — D50.9 IRON DEFICIENCY ANEMIA, UNSPECIFIED IRON DEFICIENCY ANEMIA TYPE: ICD-10-CM

## 2025-01-14 RX ORDER — ASCORBIC ACID 500 MG
TABLET ORAL
Qty: 45 TABLET | Refills: 0 | Status: SHIPPED | OUTPATIENT
Start: 2025-01-14

## 2025-01-14 NOTE — RESULT ENCOUNTER NOTE
Holly, your xray doesn't show any sign of new fracture but you need to follow up with your orthopedic specialist.   Hannah Church MD

## 2025-01-15 ENCOUNTER — TELEPHONE (OUTPATIENT)
Dept: FAMILY MEDICINE | Facility: CLINIC | Age: 70
End: 2025-01-15
Payer: COMMERCIAL

## 2025-01-15 DIAGNOSIS — E11.40 TYPE 2 DIABETES MELLITUS WITH DIABETIC NEUROPATHY (H): ICD-10-CM

## 2025-01-15 RX ORDER — TIRZEPATIDE 10 MG/.5ML
10 INJECTION, SOLUTION SUBCUTANEOUS
Qty: 2 ML | Refills: 0 | Status: SHIPPED | OUTPATIENT
Start: 2025-01-15

## 2025-01-15 RX ORDER — INSULIN GLARGINE 100 [IU]/ML
INJECTION, SOLUTION SUBCUTANEOUS
Qty: 30 ML | Refills: 3 | Status: SHIPPED | OUTPATIENT
Start: 2025-01-15

## 2025-01-15 NOTE — TELEPHONE ENCOUNTER
"Patient calling and wanting to get a hold of Liz Calero. She states her blood sugars have been \"ernestine high\", but more particularly after she had knee surgery. Asymptomatic at this time. She has noticed BG between 200-300, and the most recent being 246 and 256 and in that range.  Writer notes patient is currently on insulin at night time and also takes metformin.    Patient is wondering if there is anything she should be doing, or if there would be any changes to her medications.    Routing to provider to review and advise.    Bill Rinaldi RN  [facility]    "

## 2025-01-15 NOTE — TELEPHONE ENCOUNTER
Patient had surgery 12/18 and since that time her blood sugars have been elevated.. Waking up at 210, 264 and a couple of times in the 300's. Blood sugars remain elevated throughout the day.    Taking Lantus 20 units daily  Mounjaro 7.5mg weekly-pharmacy wasn't able to fill the 12.5mg weekly due to supply and patient had some 7.5mg left at home so has been taking this   Metformin XR 1000 twice daily   Pioglitazone 45mg daily    Has been watching her carbohyrates since her blood sugars have been high. Eating hard boiled eggs for breakfast. Today croissant with egg. Dinner last night with fish with a little bit of rice.    Would recommend retitrating  Mounjaro to 10mg weekly and increase Lantus 25 units. Patient will call in blood sugars on Monday.    Liz Calero, Pharm.D, BCACP  Medication Therapy Management Pharmacist

## 2025-01-20 ENCOUNTER — TELEPHONE (OUTPATIENT)
Dept: PHARMACY | Facility: CLINIC | Age: 70
End: 2025-01-20

## 2025-01-20 NOTE — TELEPHONE ENCOUNTER
Patient called to report her blood sugars are running high.  Will start Mounjaro 10mg weekly today.  Patient is taking Lantus 25 units. Increased dose last.     This morning it was 135mg/dL. Patient reports blood sugars were high over the weekend.     Patient just restarting Mounjaro today and increased dose of insulin last night. Will not make any changes with doses today but encouraged patient to reach out later in the week if blood sugars in the morning remain elevated.    Liz Calero, Pharm.D, BCACP  Medication Therapy Management Pharmacist

## 2025-01-27 ENCOUNTER — THERAPY VISIT (OUTPATIENT)
Dept: PHYSICAL THERAPY | Facility: CLINIC | Age: 70
End: 2025-01-27
Payer: COMMERCIAL

## 2025-01-27 DIAGNOSIS — Z96.652 S/P TOTAL KNEE ARTHROPLASTY, LEFT: Primary | ICD-10-CM

## 2025-01-27 PROCEDURE — 97116 GAIT TRAINING THERAPY: CPT | Mod: GP

## 2025-01-27 PROCEDURE — 97110 THERAPEUTIC EXERCISES: CPT | Mod: GP

## 2025-01-27 PROCEDURE — 97112 NEUROMUSCULAR REEDUCATION: CPT | Mod: GP

## 2025-01-28 ENCOUNTER — MYC REFILL (OUTPATIENT)
Dept: ORTHOPEDICS | Facility: CLINIC | Age: 70
End: 2025-01-28

## 2025-01-28 ENCOUNTER — PATIENT OUTREACH (OUTPATIENT)
Dept: CARE COORDINATION | Facility: CLINIC | Age: 70
End: 2025-01-28

## 2025-01-28 ENCOUNTER — VIRTUAL VISIT (OUTPATIENT)
Dept: PSYCHIATRY | Facility: CLINIC | Age: 70
End: 2025-01-28
Payer: COMMERCIAL

## 2025-01-28 VITALS — WEIGHT: 180 LBS | BODY MASS INDEX: 32.92 KG/M2

## 2025-01-28 DIAGNOSIS — F25.1 SCHIZOAFFECTIVE DISORDER, DEPRESSIVE TYPE (H): ICD-10-CM

## 2025-01-28 DIAGNOSIS — G47.00 INSOMNIA, UNSPECIFIED TYPE: Primary | ICD-10-CM

## 2025-01-28 DIAGNOSIS — F41.1 GAD (GENERALIZED ANXIETY DISORDER): ICD-10-CM

## 2025-01-28 DIAGNOSIS — Z96.652 S/P TOTAL KNEE ARTHROPLASTY, LEFT: ICD-10-CM

## 2025-01-28 RX ORDER — OXYCODONE HYDROCHLORIDE 5 MG/1
5 TABLET ORAL EVERY 6 HOURS PRN
Qty: 15 TABLET | Refills: 0 | Status: SHIPPED | OUTPATIENT
Start: 2025-01-28

## 2025-01-28 RX ORDER — BUSPIRONE HYDROCHLORIDE 30 MG/1
30 TABLET ORAL 2 TIMES DAILY
Qty: 60 TABLET | Refills: 1 | Status: SHIPPED | OUTPATIENT
Start: 2025-01-28

## 2025-01-28 RX ORDER — MIRTAZAPINE 7.5 MG/1
7.5 TABLET, FILM COATED ORAL AT BEDTIME
Qty: 30 TABLET | Refills: 1 | Status: SHIPPED | OUTPATIENT
Start: 2025-01-28

## 2025-01-28 RX ORDER — BUPROPION HYDROCHLORIDE 200 MG/1
200 TABLET, EXTENDED RELEASE ORAL
Qty: 30 TABLET | Refills: 0 | Status: SHIPPED | OUTPATIENT
Start: 2025-01-28

## 2025-01-28 RX ORDER — TRAZODONE HYDROCHLORIDE 50 MG/1
50 TABLET, FILM COATED ORAL
Qty: 30 TABLET | Refills: 1 | Status: SHIPPED | OUTPATIENT
Start: 2025-01-28

## 2025-01-28 RX ORDER — PAROXETINE 40 MG/1
40 TABLET, FILM COATED ORAL EVERY EVENING
Qty: 30 TABLET | Refills: 1 | Status: SHIPPED | OUTPATIENT
Start: 2025-01-28

## 2025-01-28 ASSESSMENT — PATIENT HEALTH QUESTIONNAIRE - PHQ9
SUM OF ALL RESPONSES TO PHQ QUESTIONS 1-9: 0
10. IF YOU CHECKED OFF ANY PROBLEMS, HOW DIFFICULT HAVE THESE PROBLEMS MADE IT FOR YOU TO DO YOUR WORK, TAKE CARE OF THINGS AT HOME, OR GET ALONG WITH OTHER PEOPLE: NOT DIFFICULT AT ALL
SUM OF ALL RESPONSES TO PHQ QUESTIONS 1-9: 0

## 2025-01-28 ASSESSMENT — ENCOUNTER SYMPTOMS
BACK PAIN: 1
WEIGHT LOSS: 1
DIZZINESS: 1
TREMORS: 1

## 2025-01-28 ASSESSMENT — PAIN SCALES - GENERAL: PAINLEVEL_OUTOF10: MODERATE PAIN (6)

## 2025-01-28 NOTE — PROGRESS NOTES
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     There are no language or communication issues or need for modification in treatment.   There are no ethnic, cultural or Yazidism factors that may be relevant for treatment.  Patient identified their preferred language to be English.  Patient does not need the assistance of an  or other support involved in treatment.    Diagnosis      295.70  (F25.1) Schizoaffective Disorder Depressive Type  300.02 (F41.1) Generalized Anxiety Disorder    Medical Comorbidities Include:   Patient Active Problem List    Diagnosis Date Noted    S/P total knee arthroplasty, left 12/18/2024     Priority: Medium    S/P total knee arthroplasty, right 11/03/2023     Priority: Medium    Osteoarthritis of right knee, unspecified osteoarthritis type 10/02/2023     Priority: Medium    Noncompliance with medication regimen 09/07/2023     Priority: Medium     overuse of oxycodone, not taking gabapentin as scheduled.      Iron deficiency anemia secondary to inadequate dietary iron intake 08/04/2023     Priority: Medium    Pneumonia 05/07/2023     Priority: Medium    Chronic right shoulder pain 02/17/2023     Priority: Medium    Chronic kidney disease, stage 3a  (H) 11/17/2021     Priority: Medium    Restless leg syndrome 09/22/2021     Priority: Medium    Tremor 09/22/2021     Priority: Medium    Fecal urgency 05/20/2020     Priority: Medium     Added automatically from request for surgery [id number]      Pseudophakia of right eye 10/25/2019     Priority: Medium    Anal stenosis 06/27/2019     Priority: Medium    S/P partial colectomy 06/27/2019     Priority: Medium    GERD (gastroesophageal reflux disease) 01/23/2019     Priority: Medium    Anemia 01/22/2019     Priority: Medium    Chest pain, musculoskeletal 01/22/2019     Priority: Medium    Diabetic neuropathy (H) 01/22/2019     Priority: Medium    Hyponatremia 01/22/2019     Priority: Medium    Schizoaffective disorder (H) 01/22/2019     Priority: Medium    Obesity (BMI 35.0-39.9) with comorbidity (H) 11/14/2018     Priority: Medium    Visual hallucination 04/21/2016     Priority: Medium    Type 2 diabetes mellitus with hyperglycemia, with long-term current use of insulin (H) 12/23/2015     Priority: Medium    Fecal incontinence due to anorectal disorder 09/28/2015     Priority: Medium    Anxiety 09/02/2015     Priority: Medium    Bilateral low back pain without sciatica 06/17/2015     Priority: Medium    Urinary retention 01/22/2015     Priority: Medium    Colostomy, evaluate (H) 10/17/2014     Priority: Medium    Encounter for long-term (current) use of insulin (H) 10/12/2014     Priority: Medium    Hypoglycemia due to insulin 09/17/2014     Priority: Medium    Rotator cuff impingement syndrome 08/07/2014     Priority: Medium    Cataracts, both eyes 05/08/2013     Priority: Medium    Dermatochalasis 05/10/2012     Priority: Medium    Presbyopia 05/10/2012     Priority: Medium    OCD (obsessive compulsive disorder) 05/10/2012     Priority: Medium    HTN, goal below 140/90 05/10/2012     Priority: Medium    Pain in joint, hand 01/25/2012     Priority: Medium    Trigger finger, acquired 01/25/2012     Priority: Medium      Problem list name updated by automated process. Provider to review      Synovitis and tenosynovitis 01/25/2012     Priority: Medium     Problem list name updated by automated process. Provider to review      Mixed incontinence urge and stress 06/01/2010     Priority: Medium    Memory loss 06/01/2010     Priority: Medium     Diabetes coma around 2003      S/P colostomy (H) 04/03/2010     Priority: Medium     Due to injury to colon while she had a hysterectomy (age late 30s):  Seen Dr. Doherty in colorectal surgery at [facility]-does not want to operated. Needs her social support and memory loss issues be resolved first.       History of cervical cancer 09/23/2009     Priority: Medium     status post NATALIE/BSO. Being followed by Dr. Angelita Addison at [facility] gyn/onc      Severe major depression with psychotic features (H) 07/31/2009     Priority: Medium     Psychiatrist: Dr. Perkins at [facility].   Neuropsychological evalation at [facility] on 12/1/09: lower end cognitive functioning and multifactorial in etiologies including effects of psychosis, medications nd perhaps non-restorative sleep. Also has features of OCD. Recommend psychiatry referral for further evaluation of past Dx of schizophrenia and also psychotherapy.   Sees a therapist monthly now (has met goals) but will monitor for relapse       Hyperlipidemia LDL goal <100 01/27/2009     Priority: Medium    Primary generalized (osteo)arthritis 01/29/2014     Priority: Low     Plan      Medications:  START Trazodone (Desyrel) 50 mg a tablet at bedtime as needed for sleep. Script sent for #30 with 1 refill.  CONTINUE Bupropion SR (Wellbutrin) 200 mg tablet in morning for depression. Script sent for #30 with 1 refill.  CONTINUE Buspirone (Buspar) 30 mg tablet twice daily for anxiety. Script sent for #30 with 1 refill.  CONTINUE Cariprazine (Vraylar) 4.5 mg capsule daily for mood and AVH. Script sent for #30 with 1  refill.  CONTINUE Mirtazapine (Remeron) 7.5 mg tablet at bedtime for depression and sleep. Script sent for #30 with 1 refill.  Continue all other medications per primary care provider.      New Labs/Orders/Referrals: Continue therapy through [facility].    Follow up: Schedule an appointment with me in 4 weeks or sooner as needed.  Call [facility] at [phone] to schedule   Plan to Discuss: Restarting trazodone, alternative sleep interventions    Safety plan reviewed. To the Emergency Department as needed or call after hours crisis line at [phone] or [phone]. [place] Crisis Text Line: Text [place] to 570950 or  Suicide LifeLine Chat: suicidepreventionlifeline.org/chat  Follow up with primary care provider as planned or sooner if needed for acute medical concerns.  Call the psychiatric nurse line with medication questions or concerns at [phone].  "[employer]hart may be used to communicate with your provider, but this is not intended to be used for emergencies.    Education and Resources     Patient Education:  Sometimes insomnia can be made worse by our own habits or situation.  You should consider making some of the following changes to help improve your sleep:   If there is excessive noise in your house / neighborhood use a fan or similar device to make a quiet background noise that can drown out other noises  If your room is too bright early in the morning, hang a blanket or extra curtain over the windows to keep the light out or use a sleeping mask to cover your eyes  If your room is too warm during the night, set the temperature in the house down a few degrees for the night  Spend a little time before bedtime trying to relax.  Don t work right up until bedtime.  Take 30-60 minutes to relax and unwind before bedtime with a book or watching TV  Do not drink anything with alcohol or caffeine in them for at least 4-5 hours before bedtime  Do not smoke right before bedtime or during  the night  No strenuous exercise for 2-3 hours before bedtime     Insomnia - Stress Management  Stress and tension can be big factors contributing to insomnia.  If you can t relax your mind and body, then you won t be able to sleep.  You may benefit from stress management self-help techniques and may benefit from the relaxation books recommended in our treatment resources handout.      Stress Management and Relaxation Books  The Relaxation and Stress Reduction Workbook by Kirstin Corbett, Shavonne Herzog and Seth Artis (2008)  Stress Management Workbook: Techniques and Self-Assessment Procedures by Deborah Aguila and Rex Whitaker (1997)  A Mindfulness-Based Stress Reduction Workbook by Russell Sams and Maritza Guan (2010)  The Complete Stress Management Workbook by Jonathan Lynne, Logan Hare and Jose Rafael Martinez (1996)  Assert Yourself by Tracy Nguyen and Myke Nguyen (1977)    Community Resources:    National Suicide Prevention Lifeline: 969.475.4750 (TTY: 811.130.7538). Call anytime for help.  (www.suicidepreventionlifeline.org)  National [place] on Mental Illness (www.haja.org): 827.699.2810 or 504-263-5330.   Mental Health Association (www.mentalhealth.org): 957.382.4562 or 054-159-0439.  [place] Crisis Text Line: Text [place] to 760497  Suicide LifeLine Chat: suicidepreApplication Expertsline.org/chat    Administrative Billing:   Supportive therapy was provided, focusing on reflective listening and solution focused problem solving.    The longitudinal plan of care for the diagnosis(es)/condition(s) as documented were addressed during this visit. Due to the added complexity in care, I will continue to support Holly in the subsequent management and with ongoing continuity of care.      Signed:   Brook Kumar DNP, APRN, PMHNP-BC....................  1/28/2025   12:54 PM     Disclaimer: This note consists of symbols derived from keyboarding, dictation and/or voice recognition software. As a  result, there may be errors in the script that have gone undetected. Please consider this when interpreting information found in this chart.

## 2025-01-28 NOTE — PROGRESS NOTES
"Virtual Visit Details    Type of service:  Video Visit   Video Start Time: {video visit start/end time for provider to select:453939}  Video End Time:{video visit start/end time for provider to select:371599}    Originating Location (pt. Location): {video visit patient location:286035::\"Home\"}  {PROVIDER LOCATION On-site should be selected for visits conducted from your clinic location or adjoining [facility] hospital, academic office, or other nearby [facility] building. Off-site should be selected for all other provider locations, including home:832890}  Distant Location (provider location):  {virtual location provider:553833}  Platform used for Video Visit: {Virtual Visit Platforms:325941::\"Daric\"}  "

## 2025-01-28 NOTE — PATIENT INSTRUCTIONS
Plan      Medications:  START Trazodone (Desyrel) 50 mg a tablet at bedtime as needed for sleep. Script sent for #30 with 1 refill.  CONTINUE Bupropion SR (Wellbutrin) 200 mg tablet in morning for depression. Script sent for #30 with 1 refill.  CONTINUE Buspirone (Buspar) 30 mg tablet twice daily for anxiety. Script sent for #30 with 1 refill.  CONTINUE Cariprazine (Vraylar) 4.5 mg capsule daily for mood and AVH. Script sent for #30 with 1 refill.  CONTINUE Mirtazapine (Remeron) 7.5 mg tablet at bedtime for depression and sleep. Script sent for #30 with 1 refill.  Continue all other medications per primary care provider.      New Labs/Orders/Referrals: Continue therapy through [facility].    Follow up: Schedule an appointment with me in 4 weeks or sooner as needed.  Call [facility] at [phone] to schedule   Plan to Discuss: Restarting trazodone, alternative sleep interventions    Safety plan reviewed. To the Emergency Department as needed or call after hours crisis line at [phone] or [phone]. [place] Crisis Text Line: Text [place] to 760801 or  Suicide LifeLine Chat: suicidepreventionlifeline.org/chat  Follow up with primary care provider as planned or sooner if needed for acute medical concerns.  Call the psychiatric nurse line with medication questions or concerns at [phone].  [employer] may be used to communicate with your provider, but this is not intended to be used for emergencies.    Education and Resources     Patient Education:  Sometimes insomnia can be made worse by our own habits or situation.  You should consider making some of the following changes to help improve your sleep:   If there is excessive noise in your house / neighborhood use a fan or similar device to make a quiet background noise that can drown out other noises  If your room is too bright early in the morning, hang a blanket or extra curtain over the windows to keep the light out or use a sleeping  mask to cover your eyes  If your room is too warm during the night, set the temperature in the house down a few degrees for the night  Spend a little time before bedtime trying to relax.  Don t work right up until bedtime.  Take 30-60 minutes to relax and unwind before bedtime with a book or watching TV  Do not drink anything with alcohol or caffeine in them for at least 4-5 hours before bedtime  Do not smoke right before bedtime or during the night  No strenuous exercise for 2-3 hours before bedtime     Insomnia - Stress Management  Stress and tension can be big factors contributing to insomnia.  If you can t relax your mind and body, then you won t be able to sleep.  You may benefit from stress management self-help techniques and may benefit from the relaxation books recommended in our treatment resources handout.      Stress Management and Relaxation Books  The Relaxation and Stress Reduction Workbook by Kirstin Corbett, Shavonne Herzog and Seth Artis (2008)  Stress Management Workbook: Techniques and Self-Assessment Procedures by Deborah Aguila and Rex Whitaker (1997)  A Mindfulness-Based Stress Reduction Workbook by Russell Sams and Maritza Guan (2010)  The Complete Stress Management Workbook by Jonathan Lynne, Logan Hare and Jose Rafael Martinez (1996)  Assert Yourself by Tracy Nguyen and Myke Nguyen (1977)    Community Resources:    National Suicide Prevention Lifeline: 136.504.7172 (TTY: 107.288.2034). Call anytime for help.  (www.suicidepreventionlifeline.org)  National [place] on Mental Illness (www.haja.org): 955.387.1907 or 977-268-2384.   Mental Health Association (www.mentalhealth.org): 530.471.2269 or 210-889-2784.  [place] Crisis Text Line: Text [place] to 356200  Suicide LifeLine Chat: suicidepreTetraphase Pharmaceuticalsline.org/chat

## 2025-01-28 NOTE — NURSING NOTE
Is the patient currently in the state of [place]? YES    Current patient location: Patient declined to provide     Visit mode:Video    If the visit is dropped, the patient can be reconnected by: VIDEO VISIT: Text to cell phone:   Telephone Information:   Mobile [phone]       Will anyone else be joining the visit? No  (If patient encounters technical issues they should call [phone])    Are changes needed to the allergy or medication list? No    Are refills needed on medications prescribed by this physician? No    Rooming Documentation: Questionnaire(s) completed.    Reason for visit: Consult     [facility]    Care team has reviewed attendance agreement with patient. Patient advised that two failed appointments within 6 months may lead to termination of current episode of care.

## 2025-01-30 DIAGNOSIS — E11.65 TYPE 2 DIABETES MELLITUS WITH HYPERGLYCEMIA, WITH LONG-TERM CURRENT USE OF INSULIN (H): ICD-10-CM

## 2025-01-30 DIAGNOSIS — Z79.4 TYPE 2 DIABETES MELLITUS WITH HYPERGLYCEMIA, WITH LONG-TERM CURRENT USE OF INSULIN (H): ICD-10-CM

## 2025-01-30 RX ORDER — ASPIRIN 81 MG
81 TABLET,CHEWABLE ORAL DAILY
Qty: 90 TABLET | Refills: 0 | Status: SHIPPED | OUTPATIENT
Start: 2025-01-30

## 2025-01-30 NOTE — TELEPHONE ENCOUNTER
Medication is being filled for 1 time refill only due to:  Patient needs to be seen because it has been more than one year since last visit.  Yessenia Salmon RN, BSN  [facility]

## 2025-02-03 ENCOUNTER — THERAPY VISIT (OUTPATIENT)
Dept: PHYSICAL THERAPY | Facility: CLINIC | Age: 70
End: 2025-02-03
Payer: COMMERCIAL

## 2025-02-03 DIAGNOSIS — Z96.652 S/P TOTAL KNEE ARTHROPLASTY, LEFT: Primary | ICD-10-CM

## 2025-02-03 PROCEDURE — 97140 MANUAL THERAPY 1/> REGIONS: CPT | Mod: GP

## 2025-02-03 PROCEDURE — 97112 NEUROMUSCULAR REEDUCATION: CPT | Mod: GP

## 2025-02-03 PROCEDURE — 97110 THERAPEUTIC EXERCISES: CPT | Mod: GP

## 2025-02-03 ASSESSMENT — ACTIVITIES OF DAILY LIVING (ADL)
RISE FROM A CHAIR: ACTIVITY IS FAIRLY DIFFICULT
HOW_WOULD_YOU_RATE_THE_OVERALL_FUNCTION_OF_YOUR_KNEE_DURING_YOUR_USUAL_DAILY_ACTIVITIES?: ABNORMAL
KNEE_ACTIVITY_OF_DAILY_LIVING_SUM: 31
SIT WITH YOUR KNEE BENT: ACTIVITY IS NOT DIFFICULT
KNEEL ON THE FRONT OF YOUR KNEE: I AM UNABLE TO DO THE ACTIVITY
WEAKNESS: THE SYMPTOM AFFECTS MY ACTIVITY MODERATELY
STIFFNESS: THE SYMPTOM AFFECTS MY ACTIVITY MODERATELY
GO DOWN STAIRS: ACTIVITY IS FAIRLY DIFFICULT
LIMPING: THE SYMPTOM AFFECTS MY ACTIVITY SLIGHTLY
GO UP STAIRS: ACTIVITY IS SOMEWHAT DIFFICULT
AS_A_RESULT_OF_YOUR_KNEE_INJURY,_HOW_WOULD_YOU_RATE_YOUR_CURRENT_LEVEL_OF_DAILY_ACTIVITY?: ABNORMAL
WALK: ACTIVITY IS SOMEWHAT DIFFICULT
PAIN: THE SYMPTOM AFFECTS MY ACTIVITY SEVERELY
GIVING WAY, BUCKLING OR SHIFTING OF KNEE: THE SYMPTOM AFFECTS MY ACTIVITY SEVERELY
HOW_WOULD_YOU_RATE_THE_CURRENT_FUNCTION_OF_YOUR_KNEE_DURING_YOUR_USUAL_DAILY_ACTIVITIES_ON_A_SCALE_FROM_0_TO_100_WITH_100_BEING_YOUR_LEVEL_OF_KNEE_FUNCTION_PRIOR_TO_YOUR_INJURY_AND_0_BEING_THE_INABILITY_TO_PERFORM_ANY_OF_YOUR_USUAL_DAILY_ACTIVITIES?: 10
SWELLING: THE SYMPTOM AFFECTS MY ACTIVITY SLIGHTLY
STAND: ACTIVITY IS FAIRLY DIFFICULT
SQUAT: ACTIVITY IS FAIRLY DIFFICULT
KNEE_ACTIVITY_OF_DAILY_LIVING_SCORE: 44.29
RAW_SCORE: 31

## 2025-02-03 NOTE — PROGRESS NOTES
02/03/25 0500   Appointment Info   Signing clinician's name / credentials Gigi Jaramillo, PT, DPT, CSCS, CLT   Total/Authorized Visits E&T   Visits Used 7   Medical Diagnosis S/p L TKA   PT Tx Diagnosis S/p L TKA   Progress Note/Certification   Start of Care Date 12/26/24   Onset of illness/injury or Date of Surgery 12/18/24   Therapy Frequency 2x a week   Predicted Duration 12 weeks   Certification date from 12/26/24   Certification date to 03/20/25   Progress Note Due Date 03/20/25   Progress Note Completed Date 12/26/24   GOALS   PT Goals 2;3   PT Goal 1   Goal Identifier ROM   Goal Description Holly's knee ROM will increase 0 -110 degrees of motion   Rationale to maximize safety and independence with performance of ADLs and functional tasks;to maximize safety and independence within the home;to maximize safety and independence within the community;to maximize safety and independence with self cares;to maximize safety and independence with transportation   Goal Progress 0 - 128   Target Date 02/20/25   Date Met 02/03/25   PT Goal 2   Goal Identifier Ambulation   Goal Description Holly will be able to ambulate without a walker using an SEC or no AD   Rationale to maximize safety and independence with performance of ADLs and functional tasks;to maximize safety and independence within the home;to maximize safety and independence within the community;to maximize safety and independence with transportation;to maximize safety and independence with self cares   Goal Progress She arrives today ambulating without her walker.  She reports ambulating around the house without the walker (Except when she first wakes up because of knee stiffness).  However, she still uses it anytime she leaves the house. Goal extended.  She did ambulate around [place] which was pretty far for her without pain.   Target Date 02/17/25   PT Goal 3   Goal Identifier Stairs and Squats   Goal Description Holly will be able to navigate up and  "down stairs with reciprocal pattern without pain and perform a sit to stand without upper extremity assistance   Rationale to maximize safety and independence with performance of ADLs and functional tasks;to maximize safety and independence within the home;to maximize safety and independence within the community;to maximize safety and independence with transportation;to maximize safety and independence with self cares   Goal Progress She has to do 7 stairs to get into and leave her house.  She goes up and down 1 step at a time and needs bilateral rail use. She can do mini-squats as an exercise.   Target Date 03/20/25   Subjective Report   Subjective Report She arrives today saying that she forgot her walker so she has to walk through the clinic and to the therapy department without the walker.  She is nervous walkin gwithout the walker, but has been able to ambulate here.   Objective Measures   Objective Measures Objective Measure 1;Objective Measure 2;Objective Measure 3;Objective Measure 4   Objective Measure 1   Objective Measure ROM   Details 0 - 128   Objective Measure 2   Objective Measure Single Leg Balance   Details 3 seconds unassisted single leg stance   Objective Measure 3   Objective Measure Pain on Arrival   Details \"OK. Not So Bad\" 3/10 pain rating in the left knee.   Objective Measure 4   Objective Measure Knee Outcome Survey   Details 31 (44.29%)   Treatment Interventions (PT)   Interventions Therapeutic Procedure/Exercise;Manual Therapy;Neuromuscular Re-education;Gait Training   Therapeutic Procedure/Exercise   Therapeutic Procedures: strength, endurance, ROM, flexibility minutes (82407) 15   Ther Proc 1 Bike   Ther Proc 1 - Details Seat Height 3. 6 minutes. No Resistance. No Pain and good ROM   Skilled Intervention ROM   Patient Response/Progress Best ROM is 0 - 128 with slight PT OP. Did very well   Ther Proc 2 Heel Slide Supine   Ther Proc 2 - Details 1x10 reps with max ROM of 0 - 126 "   Therapeutic Procedures Ther Proc 2   Neuromuscular Re-education   Neuromuscular re-ed of mvmt, balance, coord, kinesthetic sense, posture, proprioception minutes (56276) 14   Neuro Re-ed 1 Standing Marching   Neuro Re-ed 1 - Details 3x10 each leg with temporary SLS without support   PTRx Neuro Re-ed 1 Single Leg Stance   PTRx Neuro Re-ed 1 - Details 5 attempts with two best times being 3 seconds   PTRx Neuro Re-ed 2 Single Leg Stance - Balance Left Foot   PTRx Neuro Re-ed 2 - Details 3x30 seconds with Single UE support   Skilled Intervention Single Leg Balance Tolerance   Patient Response/Progress Tolerated well. Needed rest breaks   Manual Therapy   Manual Therapy: Mobilization, MFR, MLD, friction massage minutes (77342) 9   Manual Therapy 1 STM   Manual Therapy 1 - Details STM to anterior, medial and lateral knee for pain relief x9 minutes in supine   Skilled Intervention Pain relief   Patient Response/Progress Was sore since ambulating without walker. Ended therapy with manual. Well tolerated   Plan   Home program See PTRx   Updates to plan of care Continue per POC   Plan for next session Doing well. Continued focus on walking without AD and more functional strengthening   Total Session Time   Timed Code Treatment Minutes 38   Total Treatment Time (sum of timed and untimed services) 38         PLAN  Continue therapy per current plan of care.    Range of Motion is 0 - 128     Motion is excellent. Primary focus is strengthening, ambulation and functional tasks like squatting and stairs.    Beginning/End Dates of Progress Note Reporting Period:  12/26/24 to 02/03/2025    Referring Provider:  Sanford Sheehan

## 2025-02-04 ENCOUNTER — OFFICE VISIT (OUTPATIENT)
Dept: ORTHOPEDICS | Facility: CLINIC | Age: 70
End: 2025-02-04
Payer: COMMERCIAL

## 2025-02-04 DIAGNOSIS — Z96.652 S/P TOTAL KNEE ARTHROPLASTY, LEFT: Primary | ICD-10-CM

## 2025-02-04 PROCEDURE — 99024 POSTOP FOLLOW-UP VISIT: CPT | Performed by: ORTHOPAEDIC SURGERY

## 2025-02-04 ASSESSMENT — KOOS JR
KOOS JR SCORING: 73.34
HOW SEVERE IS YOUR KNEE STIFFNESS AFTER FIRST WAKING IN MORNING: MODERATE
RISING FROM SITTING: MILD
STRAIGHTENING KNEE FULLY: MILD
BENDING TO THE FLOOR TO PICK UP OBJECT: MILD

## 2025-02-04 NOTE — NURSING NOTE
Holly Muir's chief complaint for this visit includes:  Chief Complaint   Patient presents with    Left Knee - Total Joint Post-op     7wk s/p LT 12/18/24        Referring Provider:  Referred Self, MD  No address on file    There were no vitals taken for this visit.  Data Unavailable   Global Mental Health Score: (Proxy-Rptd) (P) 6  Global Physical Health Score: (Proxy-Rptd) (P) 10  PROMIS TOTAL - SUBSCORES: (Proxy-Rptd) (P) 16  [facility]: 4  KOOS Jr.  73.34     Pain increases with: none  Previous surgeries: [facility] 12/18/24  Previous injections within last 6 months: NO  Treatments done: PT - going well  Imaging completed: na  Pain: 1/10  Concerns: None. Happy with how she is doing.     Martinez Patel, ATC

## 2025-02-04 NOTE — LETTER
2/4/2025      Holly Muir  [address] [facility] [address]      Dear Colleague,    Thank you for referring your patient, Holly Muir, to the [facility]. Please see a copy of my visit note below.    Chief Complaint: Total Joint Post-op of the Left Knee (7wk s/p LTKA 12/18/24 )         HPI: Holly Muir returns today in follow-up for her left knee.  She reports she is doing very well.  She reports that this knee recovery has been much easier than the right side.  She is using a walker for balance.  She is using over-the-counter pain medication.  She reports her range of motion has been at goal.  She is very happy with how she is doing.        Medications and allergies are documented in the EMR and have been reviewed.      Current Outpatient Medications:      acetaminophen (TYLENOL) 325 MG tablet, Take 2 tablets (650 mg) by mouth every 4 hours as needed for other (mild pain). (Patient not taking: Reported on 1/11/2025), Disp: 100 tablet, Rfl: 0     aspirin 81 MG EC tablet, Take 1 tablet (81 mg) by mouth 2 times daily., Disp: 60 tablet, Rfl: 0     ASPIRIN LOW DOSE 81 MG chewable tablet, TAKE 1 TABLET BY MOUTH EVERY DAY, Disp: 90 tablet, Rfl: 0     blood glucose monitoring (NO BRAND SPECIFIED) meter device kit, Use to test blood sugar 4  times daily or as directed. Accuceck Guide meter, Disp: 1 kit, Rfl: 1     blood glucose monitoring (ONE TOUCH ULTRA 2) meter device kit, USE TO TEST BLOOD SUGAR 4 TIMES DAILY OR AS DIRECTED. ACCUCECK GUIDE METER, Disp: , Rfl:      blood glucose monitoring (ULTRA THIN 30G) lancets, Use to test blood sugar 4  times daily or as directed.lancets for acchu check guide, Disp: 400 each, Rfl: 3     buPROPion (WELLBUTRIN SR) 200 MG 12 hr tablet, Take 1 tablet (200 mg) by mouth daily (with breakfast)., Disp: 30 tablet, Rfl: 0     busPIRone HCl (BUSPAR) 30 MG tablet, Take 1 tablet (30 mg) by mouth 2 times daily. TAKE 1 TABLET BY MOUTH TWICE A DAY,  Disp: 60 tablet, Rfl: 1     carboxymethylcellulose (REFRESH PLUS) 0.5 % SOLN ophthalmic solution, PLACE 1 DROP INTO BOTH EYES 2 TIMES DAILY AS NEEDED FOR DRY EYES., Disp: 30 mL, Rfl: 11     cariprazine (VRAYLAR) 4.5 MG capsule, Take 1 capsule (4.5 mg) by mouth daily., Disp: 30 capsule, Rfl: 1     cetirizine (ZYRTEC) 10 MG tablet, TAKE 1 TABLET (10 MG) BY MOUTH DAILY., Disp: 90 tablet, Rfl: 1     Continuous Glucose  (FREESTYLE CM 2 READER) GALINA, 1 DOSE CONTINUOUS, Disp: 1 each, Rfl: 0     Continuous Glucose Sensor (FREESTYLE CM 2 SENSOR) [facility], Use one sensor every 14 days to read blood sugar, Disp: 9 each, Rfl: 3     ferrous gluconate (FERGON) 324 (38 Fe) MG tablet, TAKE 1 TABLET (324 MG) BY MOUTH DAILY (WITH BREAKFAST), Disp: 90 tablet, Rfl: 0     gabapentin (NEURONTIN) 600 MG tablet, TAKE 2 TABLETS (1,200 MG) BY MOUTH 3 TIMES DAILY FOR 30 DAYS, Disp: 180 tablet, Rfl: 5     insulin glargine (LANTUS SOLOSTAR) 100 UNIT/ML pen, INJECT 25 UNITS AT BEDTIME AS DIRECTED + 2 UNITS FOR PRIMING OF PEN., Disp: 30 mL, Rfl: 3     insulin pen needle (31G X 5 MM) 31G X 5 MM miscellaneous, Use four times 4 day times a day, Disp: 200 each, Rfl: 3     metFORMIN (GLUCOPHAGE XR) 500 MG 24 hr tablet, Take 2 tablets (1,000 mg) by mouth 2 times daily (with meals). Take two tablets with breakfast and one tablet with dinner for one week then increase to two tablets with breakfast and two tablets with dinner thereafter, Disp: 360 tablet, Rfl: 3     methocarbamol (ROBAXIN) 500 MG tablet, Take 1 tablet (500 mg) by mouth every 6 hours as needed for muscle spasms., Disp: 20 tablet, Rfl: 0     mirtazapine (REMERON) 7.5 MG tablet, Take 1 tablet (7.5 mg) by mouth at bedtime., Disp: 30 tablet, Rfl: 1     MOUNJARO 12.5 MG/0.5ML SOAJ, Inject 0.5 mLs (12.5 mg) subcutaneously every 7 days., Disp: 2 mL, Rfl: 1     Multiple Vitamin (DAILY-ISAAC MULTIVITAMIN) TABS, TAKE 1 TABLET BY MOUTH EVERY DAY, Disp: 90 tablet, Rfl: 0     nystatin  (MYCOSTATIN) 115109 UNIT/GM external powder, Apply 1 dose topically 3 times daily as needed, Disp: 60 g, Rfl: 3     omeprazole (PRILOSEC) 20 MG DR capsule, TAKE 2 CAPSULES (40 MG) BY MOUTH DAILY *TAKE 30-60 MINUTES BEFORE A MEAL*, Disp: 180 capsule, Rfl: 2     ondansetron (ZOFRAN ODT) 4 MG ODT tab, Place 4 mg under the tongue., Disp: , Rfl:      ONETOUCH ULTRA test strip, USE TO TEST BLOOD SUGAR 4 TIMES DAILY OR AS DIRECTED. ACCU CHECK GUIDE STRIPS, Disp: 400 strip, Rfl: 3     oxyCODONE (ROXICODONE) 5 MG tablet, Take 1 tablet (5 mg) by mouth every 6 hours as needed for severe pain., Disp: 15 tablet, Rfl: 0     PARoxetine (PAXIL) 40 MG tablet, Take 1 tablet (40 mg) by mouth every evening., Disp: 30 tablet, Rfl: 1     pioglitazone (ACTOS) 45 MG tablet, TAKE 1 TABLET BY MOUTH EVERY DAY, Disp: 90 tablet, Rfl: 1     polyethylene glycol (MIRALAX) 17 GM/Dose powder, Take 17 g by mouth daily., Disp: 510 g, Rfl: 0     propranolol ER (INDERAL LA) 80 MG 24 hr capsule, TAKE 1 CAPSULE BY MOUTH EVERY DAY, Disp: 90 capsule, Rfl: 2     rOPINIRole (REQUIP) 0.25 MG tablet, TAKE 1 TABLET (0.25 MG) BY MOUTH EVERY EVENING, Disp: 90 tablet, Rfl: 0     senna-docusate (SENOKOT-S/PERICOLACE) 8.6-50 MG tablet, Take 1-2 tablets by mouth 2 times daily as needed for constipation. Take while on oral narcotics to prevent or treat constipation., Disp: 30 tablet, Rfl: 0     simvastatin (ZOCOR) 20 MG tablet, TAKE 1 TABLET BY MOUTH EVERYDAY AT BEDTIME, Disp: 90 tablet, Rfl: 0     traZODone (DESYREL) 50 MG tablet, Take 1 tablet (50 mg) by mouth nightly as needed for sleep., Disp: 30 tablet, Rfl: 1     vitamin C (CVS VITAMIN C) 500 MG tablet, 1/2 TABLET BY MOUTH JACOBSON WITH IRON SUPPLEMENT BEFORE MEAL, Disp: 45 tablet, Rfl: 0     VITAMIN D3 25 MCG (1000 UT) tablet, TAKE 1 TABLET BY MOUTH EVERY DAY, Disp: 90 tablet, Rfl: 3    Allergies: Morphine sulfate    Current Status:  JOSE LUIS Jr:  [facility]:  Global Mental Health Score - (Proxy-Rptd) (P) 6  Global Physical  Health Score - (Proxy-Rptd) (P) 10  PROMIS TOTAL - SUBSCORES - (Proxy-Rptd) (P) 16    Physical Exam:  On physical examination the patient appears the stated age, is in no acute distress, affect is appropriate, and breathing is non-labored.    There were no vitals taken for this visit.  There is no height or weight on file to calculate BMI.    Rises from chair: Easily  Gait: She walks with an aunt on antalgic gait behind her walker rapidly about the room.  Appearance: benign  Clinical alignment: Neutral  Incision is healed and benign no  Effusion: No effusion  Tenderness to palpation: Minimal  Extension: 2  Flexion: Normal  120 patellar tracking:  Collateral ligaments: intact  Stable in in the sagittal plane in mid-flexion.    Distally, the circulatory, motor, and sensation exam is intact with 5/5 EHL, gastroc-soleus, and tibialis anterior.  Sensation to light touch is intact.  Dorsalis pedis and posterior tibialis pulses are palpable.  There are no sores on the feet, no bruising, and no lymphedema.    Assessment: 7 weeks s/p left total knee doing well. Discussed rehab goals for 6 weeks. Discussed HEP. All the patient's questions were answered to the best of my ability.       Plan: RTC in 6 weeks, sooner if issues.   Referred Self      Again, thank you for allowing me to participate in the care of your patient.        Sincerely,    Sanford Sheehan MD    Electronically signed

## 2025-02-04 NOTE — PROGRESS NOTES
Chief Complaint: Total Joint Post-op of the Left Knee (7wk s/p LTKA 12/18/24 )         HPI: Holly Muir returns today in follow-up for her left knee.  She reports she is doing very well.  She reports that this knee recovery has been much easier than the right side.  She is using a walker for balance.  She is using over-the-counter pain medication.  She reports her range of motion has been at goal.  She is very happy with how she is doing.        Medications and allergies are documented in the EMR and have been reviewed.      Current Outpatient Medications:     acetaminophen (TYLENOL) 325 MG tablet, Take 2 tablets (650 mg) by mouth every 4 hours as needed for other (mild pain). (Patient not taking: Reported on 1/11/2025), Disp: 100 tablet, Rfl: 0    aspirin 81 MG EC tablet, Take 1 tablet (81 mg) by mouth 2 times daily., Disp: 60 tablet, Rfl: 0    ASPIRIN LOW DOSE 81 MG chewable tablet, TAKE 1 TABLET BY MOUTH EVERY DAY, Disp: 90 tablet, Rfl: 0    blood glucose monitoring (NO BRAND SPECIFIED) meter device kit, Use to test blood sugar 4  times daily or as directed. Accuceck Guide meter, Disp: 1 kit, Rfl: 1    blood glucose monitoring (ONE TOUCH ULTRA 2) meter device kit, USE TO TEST BLOOD SUGAR 4 TIMES DAILY OR AS DIRECTED. ACCUCECK GUIDE METER, Disp: , Rfl:     blood glucose monitoring (ULTRA THIN 30G) lancets, Use to test blood sugar 4  times daily or as directed.lancets for acchu check guide, Disp: 400 each, Rfl: 3    buPROPion (WELLBUTRIN SR) 200 MG 12 hr tablet, Take 1 tablet (200 mg) by mouth daily (with breakfast)., Disp: 30 tablet, Rfl: 0    busPIRone HCl (BUSPAR) 30 MG tablet, Take 1 tablet (30 mg) by mouth 2 times daily. TAKE 1 TABLET BY MOUTH TWICE A DAY, Disp: 60 tablet, Rfl: 1    carboxymethylcellulose (REFRESH PLUS) 0.5 % SOLN ophthalmic solution, PLACE 1 DROP INTO BOTH EYES 2 TIMES DAILY AS NEEDED FOR DRY EYES., Disp: 30 mL, Rfl: 11    cariprazine (VRAYLAR) 4.5 MG capsule, Take 1 capsule (4.5 mg) by  mouth daily., Disp: 30 capsule, Rfl: 1    cetirizine (ZYRTEC) 10 MG tablet, TAKE 1 TABLET (10 MG) BY MOUTH DAILY., Disp: 90 tablet, Rfl: 1    Continuous Glucose  (FREESTYLE CM 2 READER) GALINA, 1 DOSE CONTINUOUS, Disp: 1 each, Rfl: 0    Continuous Glucose Sensor (FREESTYLE CM 2 SENSOR) [facility], Use one sensor every 14 days to read blood sugar, Disp: 9 each, Rfl: 3    ferrous gluconate (FERGON) 324 (38 Fe) MG tablet, TAKE 1 TABLET (324 MG) BY MOUTH DAILY (WITH BREAKFAST), Disp: 90 tablet, Rfl: 0    gabapentin (NEURONTIN) 600 MG tablet, TAKE 2 TABLETS (1,200 MG) BY MOUTH 3 TIMES DAILY FOR 30 DAYS, Disp: 180 tablet, Rfl: 5    insulin glargine (LANTUS SOLOSTAR) 100 UNIT/ML pen, INJECT 25 UNITS AT BEDTIME AS DIRECTED + 2 UNITS FOR PRIMING OF PEN., Disp: 30 mL, Rfl: 3    insulin pen needle (31G X 5 MM) 31G X 5 MM miscellaneous, Use four times 4 day times a day, Disp: 200 each, Rfl: 3    metFORMIN (GLUCOPHAGE XR) 500 MG 24 hr tablet, Take 2 tablets (1,000 mg) by mouth 2 times daily (with meals). Take two tablets with breakfast and one tablet with dinner for one week then increase to two tablets with breakfast and two tablets with dinner thereafter, Disp: 360 tablet, Rfl: 3    methocarbamol (ROBAXIN) 500 MG tablet, Take 1 tablet (500 mg) by mouth every 6 hours as needed for muscle spasms., Disp: 20 tablet, Rfl: 0    mirtazapine (REMERON) 7.5 MG tablet, Take 1 tablet (7.5 mg) by mouth at bedtime., Disp: 30 tablet, Rfl: 1    MOUNJARO 12.5 MG/0.5ML SOAJ, Inject 0.5 mLs (12.5 mg) subcutaneously every 7 days., Disp: 2 mL, Rfl: 1    Multiple Vitamin (DAILY-ISAAC MULTIVITAMIN) TABS, TAKE 1 TABLET BY MOUTH EVERY DAY, Disp: 90 tablet, Rfl: 0    nystatin (MYCOSTATIN) 282298 UNIT/GM external powder, Apply 1 dose topically 3 times daily as needed, Disp: 60 g, Rfl: 3    omeprazole (PRILOSEC) 20 MG DR capsule, TAKE 2 CAPSULES (40 MG) BY MOUTH DAILY *TAKE 30-60 MINUTES BEFORE A MEAL*, Disp: 180 capsule, Rfl: 2    ondansetron  (ZOFRAN ODT) 4 MG ODT tab, Place 4 mg under the tongue., Disp: , Rfl:     ONETOUCH ULTRA test strip, USE TO TEST BLOOD SUGAR 4 TIMES DAILY OR AS DIRECTED. ACCU CHECK GUIDE STRIPS, Disp: 400 strip, Rfl: 3    oxyCODONE (ROXICODONE) 5 MG tablet, Take 1 tablet (5 mg) by mouth every 6 hours as needed for severe pain., Disp: 15 tablet, Rfl: 0    PARoxetine (PAXIL) 40 MG tablet, Take 1 tablet (40 mg) by mouth every evening., Disp: 30 tablet, Rfl: 1    pioglitazone (ACTOS) 45 MG tablet, TAKE 1 TABLET BY MOUTH EVERY DAY, Disp: 90 tablet, Rfl: 1    polyethylene glycol (MIRALAX) 17 GM/Dose powder, Take 17 g by mouth daily., Disp: 510 g, Rfl: 0    propranolol ER (INDERAL LA) 80 MG 24 hr capsule, TAKE 1 CAPSULE BY MOUTH EVERY DAY, Disp: 90 capsule, Rfl: 2    rOPINIRole (REQUIP) 0.25 MG tablet, TAKE 1 TABLET (0.25 MG) BY MOUTH EVERY EVENING, Disp: 90 tablet, Rfl: 0    senna-docusate (SENOKOT-S/PERICOLACE) 8.6-50 MG tablet, Take 1-2 tablets by mouth 2 times daily as needed for constipation. Take while on oral narcotics to prevent or treat constipation., Disp: 30 tablet, Rfl: 0    simvastatin (ZOCOR) 20 MG tablet, TAKE 1 TABLET BY MOUTH EVERYDAY AT BEDTIME, Disp: 90 tablet, Rfl: 0    traZODone (DESYREL) 50 MG tablet, Take 1 tablet (50 mg) by mouth nightly as needed for sleep., Disp: 30 tablet, Rfl: 1    vitamin C (CVS VITAMIN C) 500 MG tablet, 1/2 TABLET BY MOUTH JACOBSON WITH IRON SUPPLEMENT BEFORE MEAL, Disp: 45 tablet, Rfl: 0    VITAMIN D3 25 MCG (1000 UT) tablet, TAKE 1 TABLET BY MOUTH EVERY DAY, Disp: 90 tablet, Rfl: 3    Allergies: Morphine sulfate    Current Status:  JOSE LUIS Jr:  [facility]:  Global Mental Health Score - (Proxy-Rptd) (P) 6  Global Physical Health Score - (Proxy-Rptd) (P) 10  PROMIS TOTAL - SUBSCORES - (Proxy-Rptd) (P) 16    Physical Exam:  On physical examination the patient appears the stated age, is in no acute distress, affect is appropriate, and breathing is non-labored.    There were no vitals taken for this  visit.  There is no height or weight on file to calculate BMI.    Rises from chair: Easily  Gait: She walks with an aunt on antalgic gait behind her walker rapidly about the room.  Appearance: benign  Clinical alignment: Neutral  Incision is healed and benign no  Effusion: No effusion  Tenderness to palpation: Minimal  Extension: 2  Flexion: Normal  120 patellar tracking:  Collateral ligaments: intact  Stable in in the sagittal plane in mid-flexion.    Distally, the circulatory, motor, and sensation exam is intact with 5/5 EHL, gastroc-soleus, and tibialis anterior.  Sensation to light touch is intact.  Dorsalis pedis and posterior tibialis pulses are palpable.  There are no sores on the feet, no bruising, and no lymphedema.    Assessment: 7 weeks s/p left total knee doing well. Discussed rehab goals for 6 weeks. Discussed HEP. All the patient's questions were answered to the best of my ability.       Plan: RTC in 6 weeks, sooner if issues.   Referred Self

## 2025-02-10 ENCOUNTER — VIRTUAL VISIT (OUTPATIENT)
Dept: PHARMACY | Facility: CLINIC | Age: 70
End: 2025-02-10
Payer: COMMERCIAL

## 2025-02-10 DIAGNOSIS — G25.81 RESTLESS LEG SYNDROME: ICD-10-CM

## 2025-02-10 DIAGNOSIS — F32.3 SEVERE MAJOR DEPRESSION WITH PSYCHOTIC FEATURES (H): ICD-10-CM

## 2025-02-10 DIAGNOSIS — K21.9 GASTROESOPHAGEAL REFLUX DISEASE WITHOUT ESOPHAGITIS: ICD-10-CM

## 2025-02-10 DIAGNOSIS — N18.31 ANEMIA DUE TO STAGE 3A CHRONIC KIDNEY DISEASE (H): ICD-10-CM

## 2025-02-10 DIAGNOSIS — J31.0 CHRONIC RHINITIS: Primary | ICD-10-CM

## 2025-02-10 DIAGNOSIS — Z79.4 TYPE 2 DIABETES MELLITUS WITHOUT COMPLICATION, WITH LONG-TERM CURRENT USE OF INSULIN (H): ICD-10-CM

## 2025-02-10 DIAGNOSIS — E78.5 HYPERLIPIDEMIA LDL GOAL <100: ICD-10-CM

## 2025-02-10 DIAGNOSIS — F42.9 OBSESSIVE-COMPULSIVE DISORDER, UNSPECIFIED TYPE: ICD-10-CM

## 2025-02-10 DIAGNOSIS — R25.1 TREMOR: ICD-10-CM

## 2025-02-10 DIAGNOSIS — H04.123 DRY EYES: ICD-10-CM

## 2025-02-10 DIAGNOSIS — D63.1 ANEMIA DUE TO STAGE 3A CHRONIC KIDNEY DISEASE (H): ICD-10-CM

## 2025-02-10 DIAGNOSIS — E11.9 TYPE 2 DIABETES MELLITUS WITHOUT COMPLICATION, WITH LONG-TERM CURRENT USE OF INSULIN (H): ICD-10-CM

## 2025-02-10 DIAGNOSIS — R21 RASH: ICD-10-CM

## 2025-02-10 DIAGNOSIS — Z78.9 TAKES DIETARY SUPPLEMENTS: ICD-10-CM

## 2025-02-10 DIAGNOSIS — F25.9 SCHIZOAFFECTIVE DISORDER, UNSPECIFIED TYPE (H): ICD-10-CM

## 2025-02-10 PROCEDURE — 99605 MTMS BY PHARM NP 15 MIN: CPT | Mod: 93 | Performed by: PHARMACIST

## 2025-02-10 PROCEDURE — 99607 MTMS BY PHARM ADDL 15 MIN: CPT | Mod: 93 | Performed by: PHARMACIST

## 2025-02-10 RX ORDER — TIRZEPATIDE 15 MG/.5ML
15 INJECTION, SOLUTION SUBCUTANEOUS
Qty: 2 ML | Refills: 3 | Status: SHIPPED | OUTPATIENT
Start: 2025-02-10

## 2025-02-10 RX ORDER — TIRZEPATIDE 15 MG/.5ML
15 INJECTION, SOLUTION SUBCUTANEOUS
Qty: 2 ML | Refills: 3 | Status: CANCELLED | OUTPATIENT
Start: 2025-02-10

## 2025-02-10 RX ORDER — IPRATROPIUM BROMIDE 21 UG/1
2 SPRAY, METERED NASAL EVERY 12 HOURS
Qty: 30 ML | Refills: 3 | Status: SHIPPED | OUTPATIENT
Start: 2025-02-10

## 2025-02-10 RX ORDER — MULTIVITAMIN WITH FOLIC ACID 400 MCG
1 TABLET ORAL DAILY
Qty: 90 TABLET | Refills: 0 | Status: SHIPPED | OUTPATIENT
Start: 2025-02-10

## 2025-02-10 NOTE — LETTER
_  Medication List        Prepared on: Feb 10, 2025     Bring your Medication List when you go to the doctor, hospital, or   emergency room. And, share it with your family or caregivers.     Note any changes to how you take your medications.  Cross out medications when you no longer use them.    Medication How I take it Why I use it Prescriber   acetaminophen (TYLENOL) 325 MG tablet Take 2 tablets (650 mg) by mouth every 4 hours as needed for other (mild pain). S/P total knee arthroplasty, left Shayy Scott PA-C   ASPIRIN LOW DOSE 81 MG chewable tablet TAKE 1 TABLET BY MOUTH EVERY DAY Type 2 diabetes mellitus with hyperglycemia, with long-term current use of insulin (H) Kenia Yadav MD   buPROPion (WELLBUTRIN SR) 200 MG 12 hr tablet Take 1 tablet (200 mg) by mouth daily (with breakfast). Schizoaffective Disorder, Depressive Type (H) KASIE Neal CNP   busPIRone HCl (BUSPAR) 30 MG tablet Take 1 tablet (30 mg) by mouth 2 times daily. TAKE 1 TABLET BY MOUTH TWICE A DAY ISABELA (Generalized Anxiety Disorder) KASIE Neal CNP   carboxymethylcellulose (REFRESH PLUS) 0.5 % SOLN ophthalmic solution PLACE 1 DROP INTO BOTH EYES 2 TIMES DAILY AS NEEDED FOR DRY EYES. Dry eyes due to decreased tear production Joseph Martinez, ANNI   cariprazine (VRAYLAR) 4.5 MG capsule Take 1 capsule (4.5 mg) by mouth daily. Schizoaffective Disorder, Depressive Type (H) KASIE Neal CNP   cetirizine (ZYRTEC) 10 MG tablet TAKE 1 TABLET (10 MG) BY MOUTH DAILY. Seasonal allergic rhinitis, unspecified trigger Tiffany King MD PhD   ferrous gluconate (FERGON) 324 (38 Fe) MG tablet TAKE 1 TABLET (324 MG) BY MOUTH DAILY (WITH BREAKFAST) Iron deficiency anemia, unspecified iron deficiency anemia type Tiffany King MD PhD   gabapentin (NEURONTIN) 600 MG tablet TAKE 2 TABLETS (1,200 MG) BY MOUTH 3 TIMES DAILY FOR 30 DAYS Arthralgia of both knees; Closed fracture of fifth metatarsal bone of left foot, physeal involvement unspecified, initial  encounter; Restless Leg Syndrome Tiffany King MD PhD   insulin glargine (LANTUS SOLOSTAR) 100 UNIT/ML pen INJECT 25 UNITS AT BEDTIME AS DIRECTED + 2 UNITS FOR PRIMING OF PEN. Type 2 diabetes mellitus with diabetic neuropathy (H) Kelly Yan PA-C   ipratropium (ATROVENT) 0.03 % nasal spray Spray 2 sprays into both nostrils every 12 hours. Chronic Rhinitis Tiffany King MD PhD   metFORMIN (GLUCOPHAGE XR) 500 MG 24 hr tablet Take 2 tablets (1,000 mg) by mouth 2 times daily (with meals). Take two tablets with breakfast and one tablet with dinner for one week then increase to two tablets with breakfast and two tablets with dinner thereafter Type 2 diabetes mellitus with hyperglycemia, with long-term current use of insulin (H) Kelly Yan PA-C   mirtazapine (REMERON) 7.5 MG tablet Take 1 tablet (7.5 mg) by mouth at bedtime. Schizoaffective Disorder, Depressive Type (H) KASIE Neal CNP   MOUNJARO 15 MG/0.5ML SOAJ Inject 0.5 mLs (15 mg) subcutaneously every 7 days. Type 2 diabetes mellitus without complication, with long-term current use of insulin (H) Kelly Yan PA-C   Multiple Vitamin (DAILY-ISAAC MULTIVITAMIN) TABS Take 1 tablet by mouth daily. Severe Major Depression with Psychotic Features (H) Tiffany King MD PhD   nystatin (MYCOSTATIN) 792362 UNIT/GM external powder Apply 1 dose topically 3 times daily as needed Skin Rash Tiffany King MD PhD   omeprazole (PRILOSEC) 20 MG DR capsule TAKE 2 CAPSULES (40 MG) BY MOUTH DAILY *TAKE 30-60 MINUTES BEFORE A MEAL* Gastroesophageal Reflux Disease without Esophagitis Tiffany King MD PhD   oxyCODONE (ROXICODONE) 5 MG tablet Take 1 tablet (5 mg) by mouth every 6 hours as needed for severe pain. S/P total knee arthroplasty, left Shayy Scott PA-C   PARoxetine (PAXIL) 40 MG tablet Take 1 tablet (40 mg) by mouth every evening. ISABELA (Generalized Anxiety Disorder) KASIE Neal CNP   pioglitazone (ACTOS) 45 MG tablet TAKE 1 TABLET BY MOUTH EVERY DAY Insulin Resistance Kelly Yan PA-C    propranolol ER (INDERAL LA) 80 MG 24 hr capsule TAKE 1 CAPSULE BY MOUTH EVERY DAY Tremor Tiffany King MD PhD   rOPINIRole (REQUIP) 0.25 MG tablet TAKE 1 TABLET (0.25 MG) BY MOUTH EVERY EVENING Restless Leg Syndrome Tiffany King MD PhD   simvastatin (ZOCOR) 20 MG tablet TAKE 1 TABLET BY MOUTH EVERYDAY AT BEDTIME High Cholesterol Tiffany King MD PhD   traZODone (DESYREL) 50 MG tablet Take 1 tablet (50 mg) by mouth nightly as needed for sleep. Insomnia, unspecified type KASIE Neal CNP   vitamin C (CVS VITAMIN C) 500 MG tablet 1/2 TABLET BY MOUTH JACOBSON WITH IRON SUPPLEMENT BEFORE MEAL Iron deficiency anemia, unspecified iron deficiency anemia type Tiffany King MD PhD   VITAMIN D3 25 MCG (1000 UT) tablet TAKE 1 TABLET BY MOUTH EVERY DAY Severe recurrent major depressive disorder with psychotic features (H) Tiffany King MD PhD         Add new medications, over-the-counter drugs, herbals, vitamins, or  minerals in the blank rows below.    Medication How I take it Why I use it Prescriber                                      Allergies:      - Morphine Sulfate - Itching        Side effects I have had:      Not on File        Other Information:              My notes and questions:

## 2025-02-10 NOTE — PATIENT INSTRUCTIONS
"Recommendations from today's MTM visit:                                                         Recommend Increasing Mounjaro to 15mg weekly  Recommend taking Omeprazole 30 minutes before eating  Recommend Atrovent nasal spray 2 sprays each nostril twice daily   Recommend updated Iron labs  Recommend picking up prescription for trazodone from pharmacy    Follow-up: 8 weeks    It was great speaking with you today.  I value your experience and would be very thankful for your time in providing feedback in our clinic survey. In the next few days, you may receive an email or text message from [employer] with a link to a survey related to your  clinical pharmacist.\"     To schedule another MTM appointment, please call the clinic directly or you may call the MTM scheduling line at [phone] or toll-free at [phone].     My Clinical Pharmacist's contact information:                                                      Please feel free to contact me with any questions or concerns you have.      Liz Calero, Pharm.D, BCACP  Medication Therapy Management Pharmacist      "

## 2025-02-10 NOTE — LETTER
February 10, 2025  Holly Muir  [address] [facility] [address]    Dear Ms. Muir, [facility] [place]     Thank you for talking with me on Feb 10, 2025 about your health and medications. As a follow-up to our conversation, I have included two documents:      Your Recommended To-Do List has steps you should take to get the best results from your medications.  Your Medication List will help you keep track of your medications and how to take them.    If you want to talk about these documents, please call Liz Calero RPH at phone: [phone], Monday-Friday 8-4:30pm.    I look forward to working with you and your doctors to make sure your medications work well for you.    Sincerely,  Liz Calero RPH  [facility] Pharmacist, [facility]

## 2025-02-10 NOTE — LETTER
"Recommended To-Do List      Prepared on: Feb 10, 2025       You can get the best results from your medications by completing the items on this \"To-Do List.\"      Bring your To-Do List when you go to your doctor. And, share it with your family or caregivers.    My To-Do List:  What we talked about: What I should do:   A new medication that may be of benefit to you    Start taking ATROVENT NASAL SPRAY          What we talked about: What I should do:   Your medication dosage being too low    Change when you are taking omeprazole (PriLOSEC)          What we talked about: What I should do:   The importance of taking your medication as intended    Education:  prescription for trazodone from your pharmacy           What we talked about: What I should do:   Your medication dosage being too low    Increase your dosage of Mounjaro          What we talked about: What I should do:   Your medication dosage being too low               What we talked about: What I should do:                     "

## 2025-02-10 NOTE — PROGRESS NOTES
Medication Therapy Management (MTM) Encounter    ASSESSMENT:                            Medication Adherence/Access: See below for considerations    Diabetes:   Patient is not meeting A1c goal of < 7%.May benefit from increasing Mounjaro dose.    Hyperlipidemia   Stable.    Rhinitis   Patient would benefit from starting Atrovent nasal spray.    GERD  Patient would benefit from the following changes: taking omeprazole 30 minutes prior to eating.    Supplements  Patient would benefit from updated iron studies    Depression, Insomnia, and OCD and Schizoaffective Disorder  Patient would benefit from picking up prescription for trazodone.    Restless Legs  Stable    Tremor  Stable     Dry Eyes  Stable    Rash  Stable.      PLAN:                            Recommend Increasing Mounjaro to 15mg weekly  Recommend taking Omeprazole 30 minutes before eating  Recommend Atrovent nasal spray 2 sprays each nostril twice daily   Recommend updated Iron labs  Recommend picking up prescription for trazodone from pharmacy    Follow-up: 8 weeks    SUBJECTIVE/OBJECTIVE:                          Holly Muir is a 69 year old female seen for a follow-up visit.  Today's visit is a follow-up MTM visit from 11/26/2024.     Reason for visit:  blood sugars; glimepiride d/c    Allergies/ADRs: Reviewed in chart  Past Medical History: Reviewed in chart  Tobacco: She reports that she quit smoking about 47 years ago. Her smoking use included cigarettes. She started smoking about 48 years ago. She has never used smokeless tobacco.  Alcohol: not currently using  Lives between two daughters.     Medication Adherence/Access: daughters help manage medications. Uses a pill box.   Has not missed any doses in the last week.  The patient fills medications at [place]: NO, fills medications at [facility] in [place]    Diabetes   Type 2 Diabetes:    Lantus 25 units daily  Mounjaro 12.5mg weekly  Actos 45mg daily  Metformin XR 1000mg twice daily  Aspirin  81mg daily    Patient is not experiencing side effects.  Blood sugar monitoring: CGM-  7 day average   12am-194  3am-181  6am-167  9am-215  12pm-207  3pm-181  6pm-167  9pm- 188     Time in [facility] % >240mg-6% 181-240 50% -44% <70-0%    Current diabetes symptoms: None  Appetite is less, when out to eat only eats half of a burger  Doesn't eat until 8-9am. Boiled eggs if blood sugar is high or will have eggs and potatoes or small pancake if sugar is not high.   Lunch-sandwich or tuna salad.  Has been eating a late dinner-tacos, abogalose  Sometimes snacks-skinny popcorn  Exercise has been limited with recent knee surgery. Has been going to physical therapy.  Drinks water, rarely has a soda, sometimes unsweetened tea  Follows with endocrinology  Starting weight 193lb. Current weight 180lb     Eye exam in the last 12 months? No  Foot exam: Yes    Hyperlipidemia     simvastatin 20mg daily  Patient reports no significant myalgias or other side effects.  The ASCVD Risk score (Crow DK, et al., 2019) failed to calculate for the following reasons:    The systolic blood pressure is missing    The valid total cholesterol range is 130 to 320 mg/dL     Rhinitis:    cetirizine 10 mg once daily  Patient reports the following medication side effects: dry mouth.    Primary symptoms are runny nose.   Patient feels that current therapy is not effective.       GERD      Omeprazole 20 mg once daily   Patient reports belching and eructation.   Patient feels that current regimen is not effective. Is taking at 8-9am and eats a little after that.       Mental Health   Depression and OCD/Schizoeffective Disorder/Insomnia:  Bupropion SR 200mg daily  Buspirone 30mg twice daily  Mirtazapine 7.5mg once daily  Paroxetine 40mg once daily.   Cariprazine 4.5mg daily  Trazodone 50mg night as needed for sleep-has not picked up from pharmacy    Reports mood has been good. Has had a few visual hallucinations and is following closely with  psychiatry.   At times will stay up for 1-2 days at a time.   Following with her therapist every 3 weeks.       Supplements     Ferrous Gluconate 324mg daily  Multivitamin daily  Vitamin C 250mg daily along with iron supplement  Vitamin D 1000 units daily  No reported issues at this time.             Restless Legs  Gabapentin 1200mg three times daily  Ropinirole 0.25mg every evening    Restless legs have been good. Denies side effects. Notices if she misses a dose of gabapentin ropinirole.    Tremor  Propranolol ER 80mg daily    Controlled with propranolol. No concerns.    Dry Eyes:  Refresh solution 1 drops into both eyes twice daily as needed    No concerns.     Rash  Nystatin powder apply three times daily as needed     No concerns.    ----------------  I spent 37 minutes with this patient today. All changes were made via collaborative practice agreement with Dr. King. A copy of the visit note was provided to the patient's provider(s).    A summary of these recommendations was sent via [employer].    Telemedicine Visit Details  The patient's medications can be safely assessed via a telemedicine encounter.  Type of service:  Telephone visit  Originating Location (pt. Location): Home    Distant Location (provider location):  On-site  Start Time: 1:32PM  End Time:2:09PM     Medication Therapy Recommendations  Chronic rhinitis   1 Rationale: Synergistic therapy - Needs additional medication therapy - Indication   Recommendation: Start Medication - ATROVENT NA   Status: Accepted per CPA   Identified Date: 2/10/2025 Completed Date: 2/10/2025         GERD (gastroesophageal reflux disease)   1 Current Medication: omeprazole (PRILOSEC) 20 MG DR capsule   Current Medication Sig: TAKE 2 CAPSULES (40 MG) BY MOUTH DAILY *TAKE 30-60 MINUTES BEFORE A MEAL*   Rationale: Dose too low - Dosage too low - Effectiveness   Recommendation: Change Administration Time   Status: Patient Agreed - Adherence/Education   Identified Date: 2/10/2025  Completed Date: 2/10/2025         Severe major depression with psychotic features (H)   1 Current Medication: traZODone (DESYREL) 50 MG tablet   Current Medication Sig: Take 1 tablet (50 mg) by mouth nightly as needed for sleep.   Rationale: Does not understand instructions - Adherence - Adherence   Recommendation: Provide Education   Status: Patient Agreed - Adherence/Education   Identified Date: 2/10/2025 Completed Date: 2/10/2025         Type 2 diabetes mellitus with hyperglycemia, with long-term current use of insulin (H)   1 Current Medication: MOUNJARO 12.5 MG/0.5ML SOAJ (Discontinued)   Current Medication Sig: Inject 0.5 mLs (12.5 mg) subcutaneously every 7 days.   Rationale: Dose too low - Dosage too low - Effectiveness   Recommendation: Increase Dose   Status: Accepted per CPA   Identified Date: 2/10/2025 Completed Date: 2/10/2025         2

## 2025-02-18 DIAGNOSIS — Z79.4 TYPE 2 DIABETES MELLITUS WITHOUT COMPLICATION, WITH LONG-TERM CURRENT USE OF INSULIN (H): ICD-10-CM

## 2025-02-18 DIAGNOSIS — E11.9 TYPE 2 DIABETES MELLITUS WITHOUT COMPLICATION, WITH LONG-TERM CURRENT USE OF INSULIN (H): ICD-10-CM

## 2025-02-24 ENCOUNTER — THERAPY VISIT (OUTPATIENT)
Dept: PHYSICAL THERAPY | Facility: CLINIC | Age: 70
End: 2025-02-24
Payer: COMMERCIAL

## 2025-02-24 ENCOUNTER — APPOINTMENT (OUTPATIENT)
Dept: LAB | Facility: CLINIC | Age: 70
End: 2025-02-24
Payer: COMMERCIAL

## 2025-02-24 DIAGNOSIS — Z96.652 S/P TOTAL KNEE ARTHROPLASTY, LEFT: Primary | ICD-10-CM

## 2025-02-24 PROCEDURE — 97110 THERAPEUTIC EXERCISES: CPT | Mod: GP

## 2025-02-24 PROCEDURE — 97116 GAIT TRAINING THERAPY: CPT | Mod: GP

## 2025-02-25 ENCOUNTER — VIRTUAL VISIT (OUTPATIENT)
Dept: PSYCHIATRY | Facility: CLINIC | Age: 70
End: 2025-02-25
Payer: COMMERCIAL

## 2025-02-25 DIAGNOSIS — F41.1 GAD (GENERALIZED ANXIETY DISORDER): ICD-10-CM

## 2025-02-25 DIAGNOSIS — G47.00 INSOMNIA, UNSPECIFIED TYPE: ICD-10-CM

## 2025-02-25 DIAGNOSIS — F25.1 SCHIZOAFFECTIVE DISORDER, DEPRESSIVE TYPE (H): ICD-10-CM

## 2025-02-25 RX ORDER — MIRTAZAPINE 7.5 MG/1
7.5 TABLET, FILM COATED ORAL AT BEDTIME
Qty: 30 TABLET | Refills: 1 | Status: SHIPPED | OUTPATIENT
Start: 2025-02-25

## 2025-02-25 RX ORDER — BUSPIRONE HYDROCHLORIDE 30 MG/1
30 TABLET ORAL 2 TIMES DAILY
Qty: 60 TABLET | Refills: 1 | Status: SHIPPED | OUTPATIENT
Start: 2025-02-25

## 2025-02-25 RX ORDER — BUPROPION HYDROCHLORIDE 100 MG/1
TABLET, EXTENDED RELEASE ORAL
Qty: 30 TABLET | Refills: 1 | Status: SHIPPED | OUTPATIENT
Start: 2025-02-25

## 2025-02-25 RX ORDER — BUPROPION HYDROCHLORIDE 200 MG/1
200 TABLET, EXTENDED RELEASE ORAL
Qty: 30 TABLET | Refills: 1 | Status: SHIPPED | OUTPATIENT
Start: 2025-02-25

## 2025-02-25 RX ORDER — PAROXETINE 40 MG/1
40 TABLET, FILM COATED ORAL EVERY EVENING
Qty: 30 TABLET | Refills: 1 | Status: SHIPPED | OUTPATIENT
Start: 2025-02-25

## 2025-02-25 RX ORDER — TRAZODONE HYDROCHLORIDE 50 MG/1
50 TABLET ORAL
Qty: 30 TABLET | Refills: 1 | Status: SHIPPED | OUTPATIENT
Start: 2025-02-25

## 2025-02-25 NOTE — PROGRESS NOTES
Virtual Visit Details     Type of service:  Video Visit   Video Start Time: 10:57 AM  Video End Time: 11:10 AM     Originating Location (pt. Location): Home  Distant Location (provider location):  On-site  Platform used for Video Visit: [facility] [place] [address], [place], [address]  [place] [address]  [phone]     OUTPATIENT PSYCHIATRIC FOLLOW-UP      2025    Provider: KASIE Neal CNP      Appointment Start Time: 10:57 AM  Appointment End Time: 11:10 AM     Name: Holly Muir   : 1955                    Preferred Name: Holly    Patient attended the session alone.     Telemedicine Visit: The patient's condition can be safely assessed and treated via synchronous audio and visual telemedicine encounter.   Consent:  The patient/guardian has verbally consented to: the potential risks and benefits of telemedicine (video visit or phone) versus in person care; bill my insurance or make self-payment for services provided; and responsibility for payment of non-covered services.  As the provider I attest to compliance with applicable laws and regulations related to telemedicine.    Screening Tools         2025     8:47 AM 2024     9:33 AM 2024    11:10 AM   PHQ   PHQ-9 Total Score 0  5  17   Q9: Thoughts of better off dead/self-harm past 2 weeks Not at all  Not at all Not at all        Patient-reported    Proxy-reported         2024    11:08 AM 2024    10:26 AM 2023     9:43 AM   ISABELA-7 SCORE   Total Score 13 (moderate anxiety)  20 (severe anxiety)   Total Score 13 20 20     GAD2:       2023     9:43 AM 2024    10:35 AM 2024    11:08 AM 2025     8:47 AM   ISABELA-2   Feeling nervous, anxious, or on edge 2  0  0  0    Not being able to stop or control worrying 3  1  3  2    ISABELA-2 Total Score 5 1 3 2        Proxy-reported     History of Present Illness      Interim  "History:  I last saw Holly Muir through panel psychiatry services for an initial evaluation on 1/28/25. During that appointment, we agreed to restart trazodone to be used as needed for sleep. We also agreed to continue mirtazapine at bedtime as she reported a significant improvement when this was started. We discussed the possibility of increasing Vraylar to 6 mg to target remaining hallucinations however patient feels her hallucinations are at a manageable level and does not feel a dose increase is needed at this time. She endorsed effectiveness of buspirone in treating symptoms of anxiety throughout the day and we agreed to continue this. Paroxetine and Wellbutrin SR were also be continued as these have been effective in relieving her depression symptoms.     Today:  Patient reports mood has been \"all right.\"  Endorses increased anhedonia, low motivation, fatigue, and low mood which she associates with the weather.  Denies suicidal ideation.  Continues to experience visual hallucinations approximately 1 time per week which do not interfere with her ability to function.  Sleep has improved, described as \"pretty good,\" continues to wake approximately 2 times per night however is able to return to sleep without issue.  Trazodone has been helpful with sleep initiation, denies side effects.  Still gets an average of 6 to 7 hours per night, and nightmares continued to occur at random approximately 1-2 times per month.    Side effects: Denies  Medication adherence: Reports good med adherence.    Psychiatric Review of Systems      Comprehensive review of symptoms completed, pertinent positives noted below:    Depression: depressed mood, anhedonia, low motivation and low energy  Anxiety: irritability  Panic: no symptoms  Yanet: no symptoms  Psychosis: hallucinations (VH 1 time per week)  OCD: no symptoms  Trauma: no symptoms  Eating: no symptoms  Disruptive/Impulse/Conduct: No symptoms  Neurodevelopmental: no " symptoms  Neurocognitive: no symptoms  Substance/Addiction: no symptoms  Sleep: Duration: 6-7 hrs/night. no symptoms  Safety: Denies SI, SIB (thoughts or behaviors), HI, property destruction, and aggression.    Medications Prior to Appointment       [place]NPMP: I have queried the [place] and/or [place] Prescription Monitoring Program for this patient for the preceding 12 months, or reviewed the report provided by my proxy delegate. I have not identified any concerns.     Current Prescribed Psychotropic Medications:  Paroxetine (Paxil) 40 mg tablet daily for mood and anxiety.   Bupropion SR (Wellbutrin) 200 mg tablet in morning for depression.  Buspirone (Buspar) 30 mg tablet twice daily for anxiety.  Cariprazine (Vraylar) 4.5 mg capsule daily for mood and AVH.  Mirtazapine (Remeron) 7.5 mg tablet at bedtime for depression and sleep.  Trazodone (Desyrel) 50 mg a tablet at bedtime as needed for sleep.    Patient is taking medications are prescribed.  Patient denies current side effects or concerns.  Patient denies any difficulties with taking medications.     Previous Psychotropic Medication Trials:  Aripiprazole (Abilify)     Medical History      Primary Care Provider: Tiffany King MD PhD    Past Medical History:   Diagnosis Date    Bilateral knee pain     Cataracts, both eyes 05/08/2013    Cervical cancer (H)     Chronic kidney disease, stage 3a (H) 11/17/2021    Depressive disorder 2005?    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      Past Surgical History:   Procedure Laterality Date    ARTHROPLASTY KNEE Right 10/02/2023    Procedure: ARTHROPLASTY, RIGHT KNEE, TOTAL;  Surgeon: Sanford Sheehan MD;  Location: [facility]    ARTHROPLASTY KNEE Left 12/18/2024    Procedure:  Left total knee arthroplasty;  Surgeon: Sanford Sheehan MD;  [facility]: [facility]    ARTHROSCOPY KNEE RT/LT      LT    BIOPSY  1975    Lumb onleft side of breast    BLEPHAROPLASTY BILATERAL Bilateral 08/16/2019    Procedure: BILATERAL UPPER LID BLEPHAROPLASTY;  Surgeon: Randolph Cook MD;  Location: [facility]    BREAST LUMPECTOMY, RT/LT      LT-benign     CATARACT IOL, RT/LT      COLONOSCOPY  05/10/2012    Procedure:COLONOSCOPY; screening colonoscopy; Surgeon:MILLA VEGA; Location:[facility]    COLONOSCOPY WITH CO2 INSUFFLATION N/A 08/02/2019    Procedure: Colonoscopy with CO2 insufflation;  Surgeon: Himanshu Hi MD;  Location: [facility]    COLOSTOMY  2000    COLOSTOMY      HYSTERECTOMY, PAP NO LONGER INDICATED      done in [place]-cervical cancer    IMPLANT STIMULATOR SACRAL NERVE STAGE ONE Right 03/10/2015    Procedure: IMPLANT STIMULATOR SACRAL NERVE STAGE ONE;  Surgeon: Teodora Yusuf MD;  [facility]    IMPLANT STIMULATOR SACRAL NERVE STAGE TWO Right 03/31/2015    Procedure: IMPLANT STIMULATOR SACRAL NERVE STAGE TWO;  Surgeon: Teodora Yusuf MD;  [facility]    IMPLANT STIMULATOR SACRAL NERVE STAGE TWO Right 06/22/2020    Procedure: INSERTION, SACRAL NERVE STIMULATOR, STAGE 2 (replacement of interstim battery);  Surgeon: Teodora Yusuf MD;  Location: [facility]    INJECT EPIDURAL TRANSFORAMINAL Right 01/20/2023    Procedure: Right Lumbar 5-Sacral 1 Transforaminal Epidural Steroid Injection with fluoroscopic guidance and contrast;  Surgeon: Kulwinder Booth MD;  Location: [facility]    INJECT EPIDURAL TRANSFORAMINAL Right 03/23/2023    Procedure: Right Lumbar 5-Sacral 1 Transforaminal Epidural Steroid Injection with fluoroscopic guidance and contrast.;  Surgeon: Kulwinder Booth MD;  Location: [facility]    PHACOEMULSIFICATION WITH STANDARD INTRAOCULAR LENS IMPLANT Left 09/26/2019    Procedure: LEFT PHACOEMULSIFICATION, CATARACT, WITH STANDARD IOL INSERTION;  Surgeon: Francesco Winn,  MD;  Location: [facility]    PHACOEMULSIFICATION WITH STANDARD INTRAOCULAR LENS IMPLANT Right 10/24/2019    Procedure: RIGHT PHACOEMULSIFICATION, CATARACT, WITH STANDARD IOL INSERTION;  Surgeon: Francesco Winn MD;  Location: [facility]    REPAIR PTOSIS Bilateral 08/16/2019    SALPINGO OOPHORECTOMY,R/L/JENNIFER      Salpingo Oophorectomy, RT/LT/JENNIFER    ZZC EXCIS PTERYGIUM  10/2008    [facility]    ZZC STOMACH SURGERY PROCEDURE UNLISTED      ZZHC COLONOSCOPY THRU STOMA, DIAGNOSTIC  2002    normal per report-no records available-records destroyed     Social History      Highest level of education completed: Some High School  Employment: On disability.  Relationship status:  ~10 approximately years. Pt has 3 children.  Current lives in [place], [place] with Daughter, Son In-Law, and Grandson. From [place] but moved here in 2005.   Substance use:  Denies    Support Systems/Strengths/Hobbies: Patient's support system includes her sister Alcira, 3 daughters, 10 grandchildren, and therapist. Patient enjoys going to the movies when she is feeling well enough to leave the house.   Current Stressors: denies    Vitals      Virtual visit. Not completed today.     Labs      Most recent laboratory results reviewed and pertinent results include:   Office Visit on 01/11/2025   Component Date Value Ref Range Status    Group A Strep antigen 01/11/2025 Negative  Negative Final    Group A strep by PCR 01/11/2025 Not Detected  Not Detected Final   Lab on 12/07/2024   Component Date Value Ref Range Status    Sodium 12/07/2024 135  135 - 145 mmol/L Final    Potassium 12/07/2024 5.2  3.4 - 5.3 mmol/L Final    Chloride 12/07/2024 99  98 - 107 mmol/L Final    Carbon Dioxide (CO2) 12/07/2024 25  22 - 29 mmol/L Final    Anion Gap 12/07/2024 11  7 - 15 mmol/L Final    Urea Nitrogen 12/07/2024 17.8  8.0 - 23.0 mg/dL Final    Creatinine 12/07/2024 0.83  0.51 - 0.95 mg/dL Final    GFR Estimate 12/07/2024 76  >60 mL/min/1.73m2 Final    eGFR  calculated using 2021 CKD-EPI equation.    Calcium 12/07/2024 9.9  8.8 - 10.4 mg/dL Final    Reference intervals for this test were updated on 7/16/2024 to reflect our healthy population more accurately. There may be differences in the flagging of prior results with similar values performed with this method. Those prior results can be interpreted in the context of the updated reference intervals.    Glucose 12/07/2024 163 (H)  70 - 99 mg/dL Final   Office Visit on 12/02/2024   Component Date Value Ref Range Status    Sodium 12/02/2024 128 (L)  135 - 145 mmol/L Final    Potassium 12/02/2024 4.9  3.4 - 5.3 mmol/L Final    Chloride 12/02/2024 92 (L)  98 - 107 mmol/L Final    Carbon Dioxide (CO2) 12/02/2024 23  22 - 29 mmol/L Final    Anion Gap 12/02/2024 13  7 - 15 mmol/L Final    Urea Nitrogen 12/02/2024 24.6 (H)  8.0 - 23.0 mg/dL Final    Creatinine 12/02/2024 1.82 (H)  0.51 - 0.95 mg/dL Final    GFR Estimate 12/02/2024 30 (L)  >60 mL/min/1.73m2 Final    eGFR calculated using 2021 CKD-EPI equation.    Calcium 12/02/2024 9.9  8.8 - 10.4 mg/dL Final    Reference intervals for this test were updated on 7/16/2024 to reflect our healthy population more accurately. There may be differences in the flagging of prior results with similar values performed with this method. Those prior results can be interpreted in the context of the updated reference intervals.    Glucose 12/02/2024 200 (H)  70 - 99 mg/dL Final    WBC Count 12/02/2024 7.2  4.0 - 11.0 10e3/uL Final    RBC Count 12/02/2024 3.90  3.80 - 5.20 10e6/uL Final    Hemoglobin 12/02/2024 11.0 (L)  11.7 - 15.7 g/dL Final    Hematocrit 12/02/2024 33.8 (L)  35.0 - 47.0 % Final    MCV 12/02/2024 87  78 - 100 fL Final    MCH 12/02/2024 28.2  26.5 - 33.0 pg Final    MCHC 12/02/2024 32.5  31.5 - 36.5 g/dL Final    RDW 12/02/2024 13.7  10.0 - 15.0 % Final    Platelet Count 12/02/2024 328  150 - 450 10e3/uL Final    Estimated Average Glucose 12/02/2024 174 (H)  <117 mg/dL Final     Hemoglobin A1C 12/02/2024 7.7 (H)  0.0 - 5.6 % Final    Normal <5.7%   Prediabetes 5.7-6.4%    Diabetes 6.5% or higher     Note: Adopted from ADA consensus guidelines.   Lab on 11/06/2024   Component Date Value Ref Range Status    Hemoglobin 11/06/2024 11.4 (L)  11.7 - 15.7 g/dL Final    Cholesterol 11/06/2024 98  <200 mg/dL Final    Triglycerides 11/06/2024 127  <150 mg/dL Final    Direct Measure HDL 11/06/2024 50  >=50 mg/dL Final    LDL Cholesterol Calculated 11/06/2024 23  <100 mg/dL Final    Non HDL Cholesterol 11/06/2024 48  <130 mg/dL Final    Patient Fasting > 8hrs? 11/06/2024 Yes   Final    Sodium 11/06/2024 132 (L)  135 - 145 mmol/L Final    Potassium 11/06/2024 4.6  3.4 - 5.3 mmol/L Final    Chloride 11/06/2024 97 (L)  98 - 107 mmol/L Final    Carbon Dioxide (CO2) 11/06/2024 25  22 - 29 mmol/L Final    Anion Gap 11/06/2024 10  7 - 15 mmol/L Final    Urea Nitrogen 11/06/2024 15.0  8.0 - 23.0 mg/dL Final    Creatinine 11/06/2024 1.00 (H)  0.51 - 0.95 mg/dL Final    GFR Estimate 11/06/2024 61  >60 mL/min/1.73m2 Final    eGFR calculated using 2021 CKD-EPI equation.    Calcium 11/06/2024 9.6  8.8 - 10.4 mg/dL Final    Reference intervals for this test were updated on 7/16/2024 to reflect our healthy population more accurately. There may be differences in the flagging of prior results with similar values performed with this method. Those prior results can be interpreted in the context of the updated reference intervals.    Glucose 11/06/2024 254 (H)  70 - 99 mg/dL Final    Patient Fasting > 8hrs? 11/06/2024 Yes   Final   Office Visit on 09/19/2024   Component Date Value Ref Range Status    Estimated Average Glucose POCT 09/19/2024 174 (H)  <117 Final    Afinion Hemoglobin A1c POCT 09/19/2024 7.7 (H)  <=5.7 % Final    Normal <5.7%   Prediabetes 5.7-6.4%    Diabetes 6.5% or higher     Note: Adopted from ADA consensus guidelines.   Lab on 07/03/2024   Component Date Value Ref Range Status    Sodium 07/03/2024  138  135 - 145 mmol/L Final    Potassium 07/03/2024 4.8  3.4 - 5.3 mmol/L Final    Chloride 07/03/2024 101  98 - 107 mmol/L Final    Carbon Dioxide (CO2) 07/03/2024 27  22 - 29 mmol/L Final    Anion Gap 07/03/2024 10  7 - 15 mmol/L Final    Urea Nitrogen 07/03/2024 10.5  8.0 - 23.0 mg/dL Final    Creatinine 07/03/2024 0.83  0.51 - 0.95 mg/dL Final    GFR Estimate 07/03/2024 76  >60 mL/min/1.73m2 Final    eGFR calculated using 2021 CKD-EPI equation.    Calcium 07/03/2024 9.9  8.8 - 10.2 mg/dL Final    Glucose 07/03/2024 187 (H)  70 - 99 mg/dL Final   Office Visit on 05/23/2024   Component Date Value Ref Range Status    Afinion Hemoglobin A1c POCT 05/23/2024 7.9 (H)  <=5.7 % Final    Normal <5.7%   Prediabetes 5.7-6.4%     Diabetes 6.5% or higher       Note: Adopted from ADA consensus guidelines.    Sodium 05/23/2024 138  135 - 145 mmol/L Final    Reference intervals for this test were updated on 09/26/2023 to more accurately reflect our healthy population. There may be differences in the flagging of prior results with similar values performed with this method. Interpretation of those prior results can be made in the context of the updated reference intervals.     Potassium 05/23/2024 5.5 (H)  3.4 - 5.3 mmol/L Final    Chloride 05/23/2024 99  98 - 107 mmol/L Final    Carbon Dioxide (CO2) 05/23/2024 30 (H)  22 - 29 mmol/L Final    Anion Gap 05/23/2024 9  7 - 15 mmol/L Final    Urea Nitrogen 05/23/2024 12.7  8.0 - 23.0 mg/dL Final    Creatinine 05/23/2024 0.81  0.51 - 0.95 mg/dL Final    GFR Estimate 05/23/2024 79  >60 mL/min/1.73m2 Final    Calcium 05/23/2024 10.3 (H)  8.8 - 10.2 mg/dL Final    Glucose 05/23/2024 229 (H)  70 - 99 mg/dL Final    Creatinine Urine mg/dL 05/23/2024 92.8  mg/dL Final    The reference ranges have not been established in urine creatinine. The results should be integrated into the clinical context for interpretation.    Albumin Urine mg/L 05/23/2024 91.8  mg/L Final    The reference ranges  "have not been established in urine albumin. The results should be integrated into the clinical context for interpretation.    Albumin Urine mg/g Cr 05/23/2024 98.92 (H)  0.00 - 25.00 mg/g Cr Final    Microalbuminuria is defined as an albumin:creatinine ratio of 17 to 299 for males and 25 to 299 for females. A ratio of albumin:creatinine of 300 or higher is indicative of overt proteinuria.  Due to biologic variability, positive results should be confirmed by a second, first-morning random or 24-hour timed urine specimen. If there is discrepancy, a third specimen is recommended. When 2 out of 3 results are in the microalbuminuria range, this is evidence for incipient nephropathy and warrants increased efforts at glucose control, blood pressure control, and institution of therapy with an angiotensin-converting-enzyme (ACE) inhibitor (if the patient can tolerate it).       Medical Review of Systems      Pertinent positives noted in HPI and below:     Review of Systems   Musculoskeletal:  Positive for joint pain.   All other systems reviewed and are negative.     No LMP recorded. Patient has had a hysterectomy.    Knee still hurts from surgery in December 2024, still completing physical therapy 2 times per week    Mental Status Exam      Appearance: adequately groomed  Behavior: cooperative, pleasant, and calm, with fair eye contact   Speech:  normal and regular rate and rhythm  Attention/Concentration: fair  Mood: \"Pretty good\"  Affect: full range and appropriate; was congruent to mood; was congruent to content  Thought Process:  unremarkable  Thought Content  Perception Disturbances: Reports visual hallucinations [details in Interim History];  Denies auditory hallucinations   Suicidality: No evidence of active or passive SI.  Homicidality: No evidence of HI, or any intention to harm others.   Insight: intact  Judgment: intact  Cognition: does  appear grossly intact; formal cognitive testing was not done  Recent and " Remote Memory: grossly intact to interview. Not formally assessed.   Fund of Knowledge: appropriate  Gait and Station: unable to assess virtually    Risk Assessment       Updated: 2025    Feels safe in home: Yes   Suicidal ideation: Denies  History of suicide attempts:  Yes. At approximately 31 to 41 years of age.   Hx of impulsivity: No   Hope for the future: Present.    Hx of Command hallucinations or current psychosis: No  History of Self-injurious behaviors: Yes Current:  No  Family member  by suicide:  No    Suicide Risk Factors: Previous self harm, diagnosis of a mental disorder (especially depression or mood disorders), older age,  or single status, and chronic disease and disability (Arthritis, diabetes, neuropathy)    Risk is mitigated by the following protective factors: access to behavioral health care, health seeking behaviors, connectedness to individuals, family, community, sense of purpose or meaning in life, personal beliefs discouraging suicide, forward thinking, future oriented, stable housing, no access to weapons, and no current SI     Based on review of above risk and protective factors and today's exam, Holly does not appear to be an imminent risk of harm to self or others, does not meet criteria for a 72-hr hold, and therefore remains appropriate for ongoing outpatient level of care. The patient convincingly denies suicidality and homicidality today. There was no deceit detected, and the patient presented in a manner that was believable. Local community safety resources reviewed as needed and routinely attached to AVS. The patient/guardian understands to call 911 or go to the nearest ED if urgent or life threatening symptoms occur.    Assessment     Holly Muir is 69 year old female with a past psychiatric history of schizoaffective disorder, depressed type and generalized anxiety disorder  who presents today for a follow up visit after her initial evaluation 4 weeks ago.      Significant symptoms include anhedonia, low motivation, fatigue, and low mood    Psychiatric history reveals no prior psychiatric hospitalizations, 3 suicide attempts and self-injurious behavior via cutting in her 30s. Currently sees Therapist at [facility] 2x per month. There is genetic loading for schizophrenia spectrum disorders. Medical history does appear to be significant for chronic kidney disease, diabetes, arthritis, and neuropathy.  Historical methamphetamine use may be contributing to the patient's current presentation. Patient's support system includes her sister Alcira, 3 daughters, 10 grandchildren, and therapist.    Regarding medication management, patient has noticed an improvement in sleep since restarting trazodone.  We discussed if Remeron is still necessary for sleep initiation and maintenance and patient would like to continue at current dose. Encouraged to only use trazodone as needed due to risks associated with polypharmacy. We may transition to scheduling trazodone nightly and discontinue Remeron in the future.  Buspar has been effective in managing symptoms related to anxiety, notes mild irritability when she needs to wait for something to happen however denies any recent outbursts. We agreed to continue Buspar 30 mg twice a day.  To address patient's ongoing anhedonia, low motivation, fatigue, and low mood we discussed adjustments that could be made to Vraylar and bupropion.  Paroxetine will be continued at 40 mg for mood and anxiety as this is the highest recommended dose for geriatric patients.  Patient would like to try increasing bupropion to 200 mg in the morning and 100 mg in the evening to target depressive symptoms.  If patient is unable to tolerate this dose increase, we will then try increasing Vraylar to 6 mg daily.  For now Vraylar will be continued at 4.5 mg daily to target mood and hallucinations.    Medication side effects and alternatives reviewed. Health  promotion activities recommended and reviewed today. All questions addressed. Education and counseling completed regarding risks and benefits of medications and psychotherapy options.     There are no language or communication issues or need for modification in treatment.   There are no ethnic, cultural or Yazidism factors that may be relevant for treatment.  Patient identified their preferred language to be English.  Patient does not need the assistance of an  or other support involved in treatment.    Diagnosis      295.70  (F25.1) Schizoaffective Disorder Depressive Type  300.02 (F41.1) Generalized Anxiety Disorder    Medical Comorbidities Include:   Patient Active Problem List    Diagnosis Date Noted    ISABELA (generalized anxiety disorder) 01/28/2025     Priority: Medium    S/P total knee arthroplasty, left 12/18/2024     Priority: Medium    S/P total knee arthroplasty, right 11/03/2023     Priority: Medium    Osteoarthritis of right knee, unspecified osteoarthritis type 10/02/2023     Priority: Medium    Noncompliance with medication regimen 09/07/2023     Priority: Medium     overuse of oxycodone, not taking gabapentin as scheduled.      Iron deficiency anemia secondary to inadequate dietary iron intake 08/04/2023     Priority: Medium    Pneumonia 05/07/2023     Priority: Medium    Chronic right shoulder pain 02/17/2023     Priority: Medium    Chronic kidney disease, stage 3a (H) 11/17/2021     Priority: Medium    Restless leg syndrome 09/22/2021     Priority: Medium    Tremor 09/22/2021     Priority: Medium    Fecal urgency 05/20/2020     Priority: Medium     Added automatically from request for surgery [id number]      Pseudophakia of right eye 10/25/2019     Priority: Medium    Anal stenosis 06/27/2019     Priority: Medium    S/P partial colectomy 06/27/2019     Priority: Medium    GERD (gastroesophageal reflux disease) 01/23/2019     Priority: Medium    Anemia 01/22/2019     Priority: Medium    Chest  pain, musculoskeletal 01/22/2019     Priority: Medium    Diabetic neuropathy (H) 01/22/2019     Priority: Medium    Hyponatremia 01/22/2019     Priority: Medium    Schizoaffective disorder (H) 01/22/2019     Priority: Medium    Obesity (BMI 35.0-39.9) with comorbidity (H) 11/14/2018     Priority: Medium    Visual hallucination 04/21/2016     Priority: Medium    Type 2 diabetes mellitus with hyperglycemia, with long-term current use of insulin (H) 12/23/2015     Priority: Medium    Fecal incontinence due to anorectal disorder 09/28/2015     Priority: Medium    Anxiety 09/02/2015     Priority: Medium    Bilateral low back pain without sciatica 06/17/2015     Priority: Medium    Urinary retention 01/22/2015     Priority: Medium    Colostomy, evaluate (H) 10/17/2014     Priority: Medium    Encounter for long-term (current) use of insulin (H) 10/12/2014     Priority: Medium    Hypoglycemia due to insulin 09/17/2014     Priority: Medium    Rotator cuff impingement syndrome 08/07/2014     Priority: Medium    Cataracts, both eyes 05/08/2013     Priority: Medium    Dermatochalasis 05/10/2012     Priority: Medium    Presbyopia 05/10/2012     Priority: Medium    OCD (obsessive compulsive disorder) 05/10/2012     Priority: Medium    HTN, goal below 140/90 05/10/2012     Priority: Medium    Pain in joint, hand 01/25/2012     Priority: Medium    Trigger finger, acquired 01/25/2012     Priority: Medium     Problem list name updated by automated process. Provider to review      Synovitis and tenosynovitis 01/25/2012     Priority: Medium     Problem list name updated by automated process. Provider to review      Mixed incontinence urge and stress 06/01/2010     Priority: Medium    Memory loss 06/01/2010     Priority: Medium     Diabetes coma around 2003      S/P colostomy (H) 04/03/2010     Priority: Medium     Due to injury to colon while she had a hysterectomy (age late 30s):  Seen Dr. Doherty in colorectal surgery at  [facility]-does not want to operated. Needs her social support and memory loss issues be resolved first.       History of cervical cancer 09/23/2009     Priority: Medium     status post NATALIE/BSO. Being followed by Dr. Angelita Addison at [facility] gyn/onc      Severe major depression with psychotic features (H) 07/31/2009     Priority: Medium     Psychiatrist: Dr. Perkins at [facility].   Neuropsychological evalation at [facility] on 12/1/09: lower end cognitive functioning and multifactorial in etiologies including effects of psychosis, medications nd perhaps non-restorative sleep. Also has features of OCD. Recommend psychiatry referral for further evaluation of past Dx of schizophrenia and also psychotherapy.   Sees a therapist monthly now (has met goals) but will monitor for relapse       Hyperlipidemia LDL goal <100 01/27/2009     Priority: Medium    Primary generalized (osteo)arthritis 01/29/2014     Priority: Low     Plan       Medications:  INCREASE to Bupropion SR (Wellbutrin) 200 mg tablet in morning and 100 mg tablet in evening for depressive symptoms.   CONTINUE Paroxetine (Paxil) 40 mg tablet daily for mood and anxiety.  CONTINUE Buspirone (Buspar) 30 mg tablet twice daily for anxiety. CONTINUE Cariprazine (Vraylar) 4.5 mg capsule daily for mood and AVH.  CONTINUE Mirtazapine (Remeron) 7.5 mg tablet at bedtime for depression and sleep.   CONTINUE Trazodone (Desyrel) 50 mg a tablet at bedtime as needed for sleep.   Continue all other medications per primary care provider.      New Labs/Orders/Referrals: Continue therapy through [facility].    Follow up: Schedule an appointment with me in 4 weeks or sooner as needed.  Call [facility] at [phone] to schedule   Plan to Discuss: Bupropion dose increase    Safety plan reviewed. To the Emergency Department as needed or call after hours crisis line at [phone] or [phone].    [place] Crisis Text Line: Text [place] to 645924 or Suicide LifeLine Chat: suicideKeen Systems.org/chat  Follow up with primary care provider as planned or sooner if needed for acute medical concerns.  Call the psychiatric nurse line with medication questions or concerns at [phone].  MyChart may be used to communicate with your provider, but this is not intended to be used for emergencies.    Education and Resources     Bupropion:  Potential side effects of Bupropion include:   Nausea (Nausea appears to diminish over time).  Dry mouth     Nausea    Insomnia    Dizziness    Anxiety    Dyspepsia    Sinusitis    Tremor      Community Resources:    National Suicide Prevention Lifeline: 809.950.8506 (TTY: 812.927.4605). Call anytime for help.  (www.suicidepreventionlifeline.org)  National [place] on Mental Illness (www.haja.org): 887.984.9150 or 339-900-0147.   Mental Health Association (www.mentalhealth.org): 600.811.6826 or 330-088-7120.  [place] Crisis Text Line: Text [place] to 425527  Suicide LifeLine Chat: suicideKeen Systems.org/chat    Patient Education:  If applicable the following has been discussed with the patient, parent/guardian, and or attending family member during the appointment:  Risks of polypharmacy and possible drug interactions with current medication list + common OTC products, herbs, and supplements. Moving forward, it is suggested to intermittently check-in with a clinic or retail pharmacist whenever new medications or OTC/h/s are consumed.  Recommendation to adhere to CDC guidelines as it relates alcohol consumption. If taking benzodiazepines, you should abstain from alcohol intake due to increased risks of CNS and respiratory depression, as well as psychomotor impairment.  If possible, it is recommended to avoid concurrent use of prescribed: opioids  +  benzodiazepines due to increased risks of CNS and respiratory depression, as well as the increased risk of  overdose.  Recommendation to minimize and or abstain from THC use (unless the pt. is prescribed medical marijuana).  Recommendation to abstain from illicit substances including but not limited to the following: heroin, street fentanyl, cocaine, methamphetamines, and bath salts.  Do not take opioids, stimulants, and or other prescription medications unless they are specifically prescribed for you.  Recommendation to abstain from tobacco/smoking, alcohol, THC, and all illicit substances if trying to become or are pregnant.  Black Box Warnings associated with the prescribed psychotropic(s).  Potential adverse effects of antipsychotics including but not limited to the following: weight gain, metabolic syndrome, EPS/Tardive Dyskinesias.  Potential CV and neurological adverse effects of stimulants including but not limited to the following:  sudden death, MI, stroke, HTN, cardiomyopathy (long term use) as well as seizures.  The benefits of healthy lifestyle modifications such as regular physical activity, sleep hygiene, a balanced diet, practicing mindfulness, and stress management.  Safety recommendations such as securing all prescription and OTC medications, sharps, and caustic substances in addition to removing all firearms and ammunition from the home.     Administrative Billing:   Level of Medical Decision Making:   - At least 2 stable chronic problems  - Engaged in prescription drug management during visit (discussed any medication benefits, side effects, alternatives, etc.)    Supportive therapy was provided, focusing on reflective listening and solution focused problem solving.    The longitudinal plan of care for the diagnosis(es)/condition(s) as documented were addressed during this visit. Due to the added complexity in care, I will continue to support Holly in the subsequent management and with ongoing continuity of care.     Signed:   Brook Kumar, DNP, APRN, PMHNP-BC....................  2/25/2025   12:30 PM      Disclaimer: This note consists of symbols derived from keyboarding, dictation and/or voice recognition software. As a result, there may be errors in the script that have gone undetected. Please consider this when interpreting information found in this chart.

## 2025-02-25 NOTE — NURSING NOTE
Current patient location: [address] [place] [address]    Is the patient currently in the state of [place]? YES    Visit mode: VIDEO    If the visit is dropped, the patient can be reconnected by:VIDEO VISIT: Text to cell phone:   Telephone Information:   Mobile [phone]       Will anyone else be joining the visit? NO  (If patient encounters technical issues they should call [phone] :[phone])    Are changes needed to the allergy or medication list? Pt stated no changes to allergies and Pt stated no med changes    Are refills needed on medications prescribed by this physician? Discuss with provider    Rooming Documentation:  Questionnaire(s) completed    Reason for visit: RECHVERÓNICA QUEZADA

## 2025-02-25 NOTE — PROGRESS NOTES
"Virtual Visit Details    Type of service:  Video Visit   Video Start Time: {video visit start/end time for provider to select:310167}  Video End Time:{video visit start/end time for provider to select:245027}    Originating Location (pt. Location): {video visit patient location:051001::\"Home\"}  {PROVIDER LOCATION On-site should be selected for visits conducted from your clinic location or adjoining [facility] hospital, academic office, or other nearby [facility] building. Off-site should be selected for all other provider locations, including home:474035}  Distant Location (provider location):  {virtual location provider:676534}  Platform used for Video Visit: {Virtual Visit Platforms:660673::\"Avexxin\"}  "

## 2025-02-25 NOTE — PATIENT INSTRUCTIONS
Thank you for coming to the [facility] [place].    Plan      Medications:  INCREASE to Bupropion SR (Wellbutrin) 200 mg tablet in morning and 100 mg tablet in evening for depressive symptoms.   CONTINUE Paroxetine (Paxil) 40 mg tablet daily for mood and anxiety.  CONTINUE Buspirone (Buspar) 30 mg tablet twice daily for anxiety. CONTINUE Cariprazine (Vraylar) 4.5 mg capsule daily for mood and AVH.  CONTINUE Mirtazapine (Remeron) 7.5 mg tablet at bedtime for depression and sleep.   CONTINUE Trazodone (Desyrel) 50 mg a tablet at bedtime as needed for sleep.   Continue all other medications per primary care provider.      New Labs/Orders/Referrals: Continue therapy through [facility].    Follow up: Schedule an appointment with me in 4 weeks or sooner as needed.  Call [facility] at [phone] to schedule   Plan to Discuss: Bupropion dose increase    Safety plan reviewed. To the Emergency Department as needed or call after hours crisis line at [phone] or [phone].   [place] Crisis Text Line: Text [place] to 501389 or Suicide LifeLine Chat: suicidepreventionlifeline.org/chat  Follow up with primary care provider as planned or sooner if needed for acute medical concerns.  Call the psychiatric nurse line with medication questions or concerns at [phone].  MyChart may be used to communicate with your provider, but this is not intended to be used for emergencies.    Education and Resources     Bupropion:  Potential side effects of Bupropion include:   Nausea (Nausea appears to diminish over time).  Dry mouth     Nausea    Insomnia    Dizziness    Anxiety    Dyspepsia    Sinusitis    Tremor      Resources  National [place] On Mental Illness (SOBEIDA)  [facility]  Improves the lives of children and adults with mental illnesses and their families by providing free classes on mental illnesses and support groups for adults with mental illnesses,  parents and family members. For more information:  Phone: 185.275.9506  Toll free: 3-917-FXPP-HELPS  Website: www.namihelps.org  [address], [place] [address], [place] [address]    Online go to: www.[facility].info  Contains information on the community services individuals and communities need to sustain and improve their daily lives--health care and childcare, job training, education and recreation, assisted, disability and social service information. This directory contains information on nonprofit and public health and human service programs and some for-profit programs such as housing.    Urgent Care for Adult Mental Health   Serving hospitals & [address]   Phone: 348.538.5833    Local Crisis Line Numbers   1. [facility] 938-203-0264  2. Community Outreach Psychiatric Emergencies (COPE) 195.447.3316  3. [facility] 713-790-9246 or 357-529-3110  4. National Suicide Prevention Lifeline 1-959.218.3862 (TALK)    National Crisis Information: Call 988 Suicide and Crisis Lifeline  Crisis Text Line (free 24/7):  call **CRISIS (**919341) Crisis or use the texting option by texting 257553.   National Suicide Prevention Lifeline: Call 988  Poison Control Center: 1-461.492.4308  Trans Lifeline: 1-413.680.5288 - Hotline for transgender people of all ages  The [employer] Project: 0-727-830-8355 - Hotline for LGBT youth   List of all [facility] resources: https://[place].gov/dhs/people-we-serve/adults/health-care/mental-health/resources/crisis-contacts.jsp    Emergency Walk-In Options:   Em[facility] Unit @ [facility] ([place]): [phone] - Specialized mental health emergency area designed to be calming  [facility] ([place]): [phone]  [facility] ([place]): [phone]  [facility]): [phone]    For Non-Emergency Support:   Fast Tracker: Mental Health & Substance Use Disorder Resources -    https://www.fasttrackermn.org/    Emergency & Urgently Needed Care:   For emergencies call 911 and/or get medical help right away.      Again thank you for choosing [facility] [place] and please let us know how we can best partner with you to improve you and your family's health.    You may be receiving a survey regarding this appointment. We would love to have your feedback, both positive and negative. The survey is done by an external company, so your answers are anonymous.

## 2025-03-06 DIAGNOSIS — I10 HTN, GOAL BELOW 140/90: ICD-10-CM

## 2025-03-06 NOTE — TELEPHONE ENCOUNTER
Per chart review, medication was discontinued on 12/19/24 by Dr. Lan. Looks like medication was discontinued during hospitalization for total knee replacement surgery.     RN called patient and LVM to call clinic at [phone].      If patient calls back, please verify with patient she is still taking prescription.      Dominique Foley RN

## 2025-03-06 NOTE — TELEPHONE ENCOUNTER
Patient returning call. Patient states that she is still taking prescription. Prescription pended. Routing to provider, please review and advise. Efraín Liu RN, BSN

## 2025-03-06 NOTE — TELEPHONE ENCOUNTER
I have not seen the patient since January 2024.  Patient is overdue for annual wellness visit.  Please schedule an appointment in the next 3 months.  Okay to use NP slot.  Route back once appointment is scheduled.

## 2025-03-08 ENCOUNTER — HEALTH MAINTENANCE LETTER (OUTPATIENT)
Age: 70
End: 2025-03-08

## 2025-03-10 DIAGNOSIS — Z79.4 TYPE 2 DIABETES MELLITUS WITHOUT COMPLICATION, WITH LONG-TERM CURRENT USE OF INSULIN (H): ICD-10-CM

## 2025-03-10 DIAGNOSIS — E11.9 TYPE 2 DIABETES MELLITUS WITHOUT COMPLICATION, WITH LONG-TERM CURRENT USE OF INSULIN (H): ICD-10-CM

## 2025-03-11 RX ORDER — BLOOD SUGAR DIAGNOSTIC
STRIP MISCELLANEOUS
Qty: 400 STRIP | Refills: 3 | Status: SHIPPED | OUTPATIENT
Start: 2025-03-11

## 2025-03-11 NOTE — TELEPHONE ENCOUNTER
LVD:  1/17/2025  [facility] Kelly Martinez PA-C  Endocrinology, Diabetes, and Metabolism     Medication: ONE TOUCH TEST STRIPS  Refill decision: Medication refilled per  Medication Refill in Ambulatory Care  policy.

## 2025-03-12 RX ORDER — RAMIPRIL 5 MG/1
5 CAPSULE ORAL DAILY
Qty: 90 CAPSULE | Refills: 1 | Status: SHIPPED | OUTPATIENT
Start: 2025-03-12

## 2025-03-12 NOTE — TELEPHONE ENCOUNTER
Sent Pt MyChart message in regards to needing Apt Pt read MyChart message.       Moraima Cummings Facilitator

## 2025-03-17 ENCOUNTER — THERAPY VISIT (OUTPATIENT)
Dept: PHYSICAL THERAPY | Facility: CLINIC | Age: 70
End: 2025-03-17
Payer: COMMERCIAL

## 2025-03-17 DIAGNOSIS — Z96.652 S/P TOTAL KNEE ARTHROPLASTY, LEFT: Primary | ICD-10-CM

## 2025-03-17 PROCEDURE — 97110 THERAPEUTIC EXERCISES: CPT | Mod: GP

## 2025-03-17 PROCEDURE — 97116 GAIT TRAINING THERAPY: CPT | Mod: GP

## 2025-03-17 ASSESSMENT — ACTIVITIES OF DAILY LIVING (ADL)
WALK: ACTIVITY IS NOT DIFFICULT
STIFFNESS: I HAVE THE SYMPTOM BUT IT DOES NOT AFFECT MY ACTIVITY
RAW_SCORE: 51
WEAKNESS: THE SYMPTOM AFFECTS MY ACTIVITY SLIGHTLY
KNEE_ACTIVITY_OF_DAILY_LIVING_SCORE: 72.86
SQUAT: ACTIVITY IS FAIRLY DIFFICULT
SWELLING: I DO NOT HAVE THE SYMPTOM
GO UP STAIRS: ACTIVITY IS SOMEWHAT DIFFICULT
GO DOWN STAIRS: ACTIVITY IS NOT DIFFICULT
HOW_WOULD_YOU_RATE_THE_CURRENT_FUNCTION_OF_YOUR_KNEE_DURING_YOUR_USUAL_DAILY_ACTIVITIES_ON_A_SCALE_FROM_0_TO_100_WITH_100_BEING_YOUR_LEVEL_OF_KNEE_FUNCTION_PRIOR_TO_YOUR_INJURY_AND_0_BEING_THE_INABILITY_TO_PERFORM_ANY_OF_YOUR_USUAL_DAILY_ACTIVITIES?: 75
KNEE_ACTIVITY_OF_DAILY_LIVING_SUM: 51
HOW_WOULD_YOU_RATE_THE_OVERALL_FUNCTION_OF_YOUR_KNEE_DURING_YOUR_USUAL_DAILY_ACTIVITIES?: NEARLY NORMAL
KNEEL ON THE FRONT OF YOUR KNEE: I AM UNABLE TO DO THE ACTIVITY
SIT WITH YOUR KNEE BENT: ACTIVITY IS NOT DIFFICULT
GIVING WAY, BUCKLING OR SHIFTING OF KNEE: THE SYMPTOM AFFECTS MY ACTIVITY MODERATELY
PAIN: I HAVE THE SYMPTOM BUT IT DOES NOT AFFECT MY ACTIVITY
LIMPING: I DO NOT HAVE THE SYMPTOM
AS_A_RESULT_OF_YOUR_KNEE_INJURY,_HOW_WOULD_YOU_RATE_YOUR_CURRENT_LEVEL_OF_DAILY_ACTIVITY?: NEARLY NORMAL
STAND: ACTIVITY IS MINIMALLY DIFFICULT
RISE FROM A CHAIR: ACTIVITY IS MINIMALLY DIFFICULT

## 2025-03-17 NOTE — PROGRESS NOTES
03/17/25 0500   Appointment Info   Signing clinician's name / credentials Gigi Jaramillo, PT, DPT, CSCS, CLT   Total/Authorized Visits E&T   Visits Used 10   Medical Diagnosis S/p L TKA   PT Tx Diagnosis S/p L TKA   Progress Note/Certification   Start of Care Date 12/26/24   Onset of illness/injury or Date of Surgery 12/18/24   Therapy Frequency 1x a week   Predicted Duration 8   Certification date from 12/26/24   Certification date to 05/15/25   Progress Note Due Date 05/15/25   Progress Note Completed Date 12/26/24   GOALS   PT Goals 2;3   PT Goal 1   Goal Identifier ROM   Goal Description Holly's knee ROM will increase 0 -110 degrees of motion   Rationale to maximize safety and independence with performance of ADLs and functional tasks;to maximize safety and independence within the home;to maximize safety and independence within the community;to maximize safety and independence with self cares;to maximize safety and independence with transportation   Goal Progress 0 - 124   Target Date 02/20/25   Date Met 02/03/25   PT Goal 2   Goal Identifier Ambulation   Goal Description Holly will be able to ambulate without a walker using an SEC or no AD   Rationale to maximize safety and independence with performance of ADLs and functional tasks;to maximize safety and independence within the home;to maximize safety and independence within the community;to maximize safety and independence with transportation;to maximize safety and independence with self cares   Goal Progress Ambulates safely without AD in therapy. She reports at home she uses no AD and ambulates independently. In the community / at the store, she uses a walker for safety due to instances of her knee buckling. Goal extended   Target Date 04/14/25   PT Goal 3   Goal Identifier Stairs and Squats   Goal Description Holly will be able to navigate up and down stairs with reciprocal pattern without pain and perform a sit to stand without upper extremity assistance  "  Rationale to maximize safety and independence with performance of ADLs and functional tasks;to maximize safety and independence within the home;to maximize safety and independence within the community;to maximize safety and independence with transportation;to maximize safety and independence with self cares   Goal Progress She can perform sit to stands and squats without assistance. She can ascend up a flight of stairs with her left leg first, but the leg becomes fatiuged after 7-8 steps and is unable to complete the flight without using the right leg.   Target Date 05/12/25   Subjective Report   Subjective Report She reports she did \"So much walking over the weekend\" and she walked with the Walker around the town.  Her back started hurting but her knee was \"not too bad\" with only a little pain.  She says her left knee is doing good. She is trying to go up the stairs with the left knee and she can do it, but by the third step to the top she gets tired and her knee won't let her anymore so she has to switch to the right non-operated leg.   Objective Measures   Objective Measures Objective Measure 1;Objective Measure 2;Objective Measure 3;Objective Measure 4;Objective Measure 5   Objective Measure 1   Objective Measure Supine Heelslide ROM   Details Active 0 - 117,  Passive 0 - 125   Objective Measure 2   Objective Measure Single Leg Balance   Details With left UE support, 1 minute easily.  Without UE support,  Unassisted, best was 3 seconds and was very unsteady   Objective Measure 3   Objective Measure Pain on Arrival   Details 3/10 pain rating in the left knee.   Objective Measure 4   Objective Measure Knee Outcome Survey   Details 51 (72.86%)   Objective Measure 5   Objective Measure Ambulation   Details Ambulates safely without AD in therapy. She reports at home she uses no AD and ambulates independently. In the community / at the store, she uses a walker for safety due to instances of her knee buckling " "  Treatment Interventions (PT)   Interventions Therapeutic Procedure/Exercise;Manual Therapy;Neuromuscular Re-education;Gait Training   Therapeutic Procedure/Exercise   Therapeutic Procedures: strength, endurance, ROM, flexibility minutes (86951) 30   Therapeutic Procedures Ther Proc 2   Ther Proc 1 Bike   Ther Proc 1 - Details Seat heigh 1 x 6 minutes   PTRx Ther Proc 1 Supine Heel Slides   PTRx Ther Proc 1 - Details 1x15   PTRx Ther Proc 2 Bridging #1   PTRx Ther Proc 2 - Details 1x15   PTRx Ther Proc 3 Short Arc Knee Extension   PTRx Ther Proc 3 - Details Reviewed. Still weak   PTRx Ther Proc 4 Sit to Stand   PTRx Ther Proc 4 - Details 1x10 unassisted from lower plinth   PTRx Ther Proc 5 Knee Bends Supported   PTRx Ther Proc 5 - Details Reviewed well fatigued with prog note testing so not performed today. Deferred.   PTRx Ther Proc 6 Side Stepping With Theraband   PTRx Ther Proc 6 - Details Reviewed but fatigued so deferred   PTRx Ther Proc 7 Forward Step   PTRx Ther Proc 7 - Details 1x10 up 6\" step (IN PT ONLY) Fatiguing   Skilled Intervention Progressed.   Patient Response/Progress Very challenging. Leg got shaky and fatigued.   Neuromuscular Re-education   Neuromuscular re-ed of mvmt, balance, coord, kinesthetic sense, posture, proprioception minutes (58025) 2   PTRx Neuro Re-ed 1 Single Leg Stance - Balance Left Foot   PTRx Neuro Re-ed 1 - Details 1x60 seconds with left UE support.  With no UE Support 2 sets with best of 3 seconds   Patient Response/Progress Rest Break Needed. Fatiguing   Gait Training   Gait Training Minutes, includes stair climbing (35811) 8   Gait 1 Gait Training without Walker   Gait 1 - Details 1x1000 feet (approximately)   Skilled Intervention NO AD Ambulation   Patient Response/Progress Did great. Recommend she discontinue the walker and transition to cane in community and no AD at home   Plan   Home program See PTRx   Updates to plan of care .   Plan for next session Doing well with " primary issue is strength. Next session we will modify her HEP to further empahsize strength to improve her quad strength, functional strength and knee endurance to prevent buckling or fatigue during stairs   Total Session Time   Timed Code Treatment Minutes 40   Total Treatment Time (sum of timed and untimed services) 40         [facility]                                                                                   OUTPATIENT PHYSICAL THERAPY    PLAN OF TREATMENT FOR OUTPATIENT REHABILITATION   Patient's Last Name, First Name, Holly Rahman YOB: 1955   Provider's Name   [facility]   Medical Record No.  [id number]     Onset Date: 12/18/24  Start of Care Date: 12/26/24     Medical Diagnosis:  S/p L TKA      PT Treatment Diagnosis:  S/p L TKA Plan of Treatment  Frequency/Duration: 1x a week/ 8    Certification date from 12/26/24 to 05/15/25         See note for plan of treatment details and functional goals     Gigi Jaramillo, PT                         I CERTIFY THE NEED FOR THESE SERVICES FURNISHED UNDER        THIS PLAN OF TREATMENT AND WHILE UNDER MY CARE     (Physician attestation of this document indicates review and certification of the therapy plan).              Referring Provider:  Sanford Sheehan    Initial Assessment  See Epic Evaluation- Start of Care Date: 12/26/24            PLAN  Continue therapy per current plan of care.  Just limited with strength. ROM is 0 - 124 (Best was 0 - 128).    Beginning/End Dates of Progress Note Reporting Period:  12/26/24 to 03/17/2025    Referring Provider:  Sanford Sheehan

## 2025-03-24 NOTE — PROGRESS NOTES
Medication Therapy Management (MTM) Encounter    ASSESSMENT:                            Medication Adherence/Access: See below for considerations    Diabetes:   Patient is not meeting A1c goal of < 7%. Time in target is improving and has had only 1 dose of Mounjaro 15mg. Will continue to monitor    Rhinitis   Stable    GERD  Patient would benefit from the following changes: increasing omeprazole dose.      Depression, Insomnia, and OCD and Schizoaffective Disorder  Patient would benefit from taking bupropion before bed as this could be contributing to insomnia    PLAN:                            Increase omeprazole to 40mg daily  Recommend taking bupropion at 8am and second dose at 4pm     Follow-up: 8 weeks    SUBJECTIVE/OBJECTIVE:                          Holly Muir is a 70 year old female seen for a follow-up visit.  Today's visit is a follow-up MTM visit from 2/10/2025.     Reason for visit:  blood sugars    Allergies/ADRs: Reviewed in chart  Past Medical History: Reviewed in chart  Tobacco: She reports that she quit smoking about 47 years ago. Her smoking use included cigarettes. She started smoking about 48 years ago. She has never used smokeless tobacco.  Alcohol: not currently using  Lives between two daughters.     Medication Adherence/Access: daughters help manage medications. Uses a pill box.   Missed maybe three doses of Lantus since our last visit  The patient fills medications at [facility]: NO, fills medications at [facility] in [place]    Diabetes   Type 2 Diabetes:    Lantus 25 units daily at night  Mounjaro 15mg weekly-has had one dose  Actos 45mg daily  Metformin XR 1000mg twice daily  Aspirin 81mg daily    Patient is not experiencing side effects.  Blood sugar monitoring: CGM  7 day average   12am-180  3am-158  6am-166  9am-178  12pm-162  3pm-151  6pm-188  9pm-228    Time in Target % >240mg-15% 181-240 27% -58% <70 0%    Blood sugars have been higher because she has been celebrating her  birthday and been going out to eat more  Current diabetes symptoms: None  Appetite is less, when out to eat only eats half of a burger  Doesn't eat until 8-9am. Boiled eggs if blood sugar is high or will have eggs and potatoes or small pancake if sugar is not high.   Lunch-sandwich or tuna salad.  Has been eating a late dinner-tacos, abogalose  Sometimes snacks-skinny popcorn  Drinks water, rarely has a soda, sometimes unsweetened tea  Follows with endocrinology  Starting weight 193lb. Current weight 173lb; does feel like her clothes are baggier  Has been walking      Eye exam in the last 12 months? No  Foot exam: Yes    Rhinitis:    cetirizine 10 mg once daily  Ipratropium nasal spray 2 sprays in each nostril twice daily   Patient reports the following medication side effects: none    Primary symptoms are runny nose.   Patient feels that current therapy is not effective.     Ipratropium nasal spray has been working well and is only having to use about once a day.      GERD      Omeprazole 20 mg once daily   Patient reports belching and eructation.   Patient feels that current regimen is not effective. Is taking at 8-9am and eats at 10am. Has more symptoms in the morning. Has more belching after she eats in the morning. Has eggs, sausage, potatoes. Drinking water.        Mental Health   Depression and OCD/Schizoeffective Disorder/Insomnia:  Bupropion SR 200mg in the morning (9-10am) and 100mg later in the day (dose increased 2/25)  Buspirone 30mg twice daily  Mirtazapine 7.5mg once daily  Paroxetine 40mg once daily.   Cariprazine 4.5mg daily  Trazodone 50mg night as needed    Follows closely with psychiatry.   Not sleeping well. Has been taking trazodone nightly.   Discussed with psychiatry potentially discontinuing mirtazapine and scheduling trazodone but at the time Holly wanted to continue on mirtazapine. Bupropion dose was increased due to low motivation. Patient not sure if increased dose of bupropion is  helpful. Has a follow up appointment with psychiatry today.  Following with her therapist every 3 weeks.    ----------------  I spent 23 minutes with this patient today. All changes were made via collaborative practice agreement with Dr. King. A copy of the visit note was provided to the patient's provider(s).    A summary of these recommendations was sent via [facility].    Telemedicine Visit Details  The patient's medications can be safely assessed via a telemedicine encounter.  Type of service:  Telephone visit  Originating Location (pt. Location): Home    Distant Location (provider location):  On-site  Start Time: 9:30AM  End Time:9:53AM     Medication Therapy Recommendations  GERD (gastroesophageal reflux disease)   1 Current Medication: omeprazole (PRILOSEC) 20 MG DR capsule (Discontinued)   Current Medication Sig: TAKE 2 CAPSULES (40 MG) BY MOUTH DAILY *TAKE 30-60 MINUTES BEFORE A MEAL*   Rationale: Dose too low - Dosage too low - Effectiveness   Recommendation: Increase Dose   Status: Accepted per CPA   Identified Date: 3/25/2025 Completed Date: 3/25/2025         Severe major depression with psychotic features (H)   1 Current Medication: buPROPion (WELLBUTRIN SR) 200 MG 12 hr tablet   Current Medication Sig: Take 1 tablet (200 mg) by mouth daily (with breakfast).   Rationale: Undesirable effect - Adverse medication event - Safety   Recommendation: Change Administration Time   Status: Patient Agreed - Adherence/Education   Identified Date: 3/25/2025 Completed Date: 3/25/2025

## 2025-03-25 ENCOUNTER — OFFICE VISIT (OUTPATIENT)
Dept: ORTHOPEDICS | Facility: CLINIC | Age: 70
End: 2025-03-25
Attending: ORTHOPAEDIC SURGERY
Payer: COMMERCIAL

## 2025-03-25 ENCOUNTER — VIRTUAL VISIT (OUTPATIENT)
Dept: PHARMACY | Facility: CLINIC | Age: 70
End: 2025-03-25
Payer: COMMERCIAL

## 2025-03-25 ENCOUNTER — VIRTUAL VISIT (OUTPATIENT)
Dept: PSYCHIATRY | Facility: CLINIC | Age: 70
End: 2025-03-25
Payer: COMMERCIAL

## 2025-03-25 ENCOUNTER — LAB (OUTPATIENT)
Dept: LAB | Facility: CLINIC | Age: 70
End: 2025-03-25
Payer: COMMERCIAL

## 2025-03-25 DIAGNOSIS — K21.9 GASTROESOPHAGEAL REFLUX DISEASE WITHOUT ESOPHAGITIS: Primary | ICD-10-CM

## 2025-03-25 DIAGNOSIS — Z79.4 TYPE 2 DIABETES MELLITUS WITHOUT COMPLICATION, WITH LONG-TERM CURRENT USE OF INSULIN (H): ICD-10-CM

## 2025-03-25 DIAGNOSIS — Z47.89 ORTHOPEDIC AFTERCARE: Primary | ICD-10-CM

## 2025-03-25 DIAGNOSIS — E11.9 TYPE 2 DIABETES MELLITUS WITHOUT COMPLICATION, WITH LONG-TERM CURRENT USE OF INSULIN (H): ICD-10-CM

## 2025-03-25 DIAGNOSIS — F32.3 SEVERE MAJOR DEPRESSION WITH PSYCHOTIC FEATURES (H): ICD-10-CM

## 2025-03-25 DIAGNOSIS — F25.9 SCHIZOAFFECTIVE DISORDER, UNSPECIFIED TYPE (H): ICD-10-CM

## 2025-03-25 DIAGNOSIS — F42.9 OBSESSIVE-COMPULSIVE DISORDER, UNSPECIFIED TYPE: ICD-10-CM

## 2025-03-25 DIAGNOSIS — G47.09 OTHER INSOMNIA: ICD-10-CM

## 2025-03-25 DIAGNOSIS — F25.1 SCHIZOAFFECTIVE DISORDER, DEPRESSIVE TYPE (H): ICD-10-CM

## 2025-03-25 DIAGNOSIS — N18.31 ANEMIA DUE TO STAGE 3A CHRONIC KIDNEY DISEASE (H): ICD-10-CM

## 2025-03-25 DIAGNOSIS — D63.1 ANEMIA DUE TO STAGE 3A CHRONIC KIDNEY DISEASE (H): ICD-10-CM

## 2025-03-25 DIAGNOSIS — J31.0 CHRONIC RHINITIS: ICD-10-CM

## 2025-03-25 LAB
FERRITIN SERPL-MCNC: 164 NG/ML (ref 11–328)
HGB BLD-MCNC: 11.4 G/DL (ref 11.7–15.7)
IRON BINDING CAPACITY (ROCHE): 329 UG/DL (ref 240–430)
IRON SATN MFR SERPL: 17 % (ref 15–46)
IRON SERPL-MCNC: 56 UG/DL (ref 37–145)

## 2025-03-25 PROCEDURE — 36415 COLL VENOUS BLD VENIPUNCTURE: CPT

## 2025-03-25 PROCEDURE — 82728 ASSAY OF FERRITIN: CPT

## 2025-03-25 PROCEDURE — 99607 MTMS BY PHARM ADDL 15 MIN: CPT | Mod: 93 | Performed by: PHARMACIST

## 2025-03-25 PROCEDURE — 83550 IRON BINDING TEST: CPT

## 2025-03-25 PROCEDURE — 99606 MTMS BY PHARM EST 15 MIN: CPT | Mod: 93 | Performed by: PHARMACIST

## 2025-03-25 PROCEDURE — 85018 HEMOGLOBIN: CPT

## 2025-03-25 PROCEDURE — 83540 ASSAY OF IRON: CPT

## 2025-03-25 PROCEDURE — 99212 OFFICE O/P EST SF 10 MIN: CPT | Performed by: ORTHOPAEDIC SURGERY

## 2025-03-25 RX ORDER — OMEPRAZOLE 40 MG/1
40 CAPSULE, DELAYED RELEASE ORAL DAILY
Qty: 90 CAPSULE | Refills: 0 | Status: ON HOLD | OUTPATIENT
Start: 2025-03-25

## 2025-03-25 ASSESSMENT — PATIENT HEALTH QUESTIONNAIRE - PHQ9
SUM OF ALL RESPONSES TO PHQ QUESTIONS 1-9: 10
10. IF YOU CHECKED OFF ANY PROBLEMS, HOW DIFFICULT HAVE THESE PROBLEMS MADE IT FOR YOU TO DO YOUR WORK, TAKE CARE OF THINGS AT HOME, OR GET ALONG WITH OTHER PEOPLE: VERY DIFFICULT
SUM OF ALL RESPONSES TO PHQ QUESTIONS 1-9: 10

## 2025-03-25 ASSESSMENT — KOOS JR
BENDING TO THE FLOOR TO PICK UP OBJECT: SEVERE
KOOS JR SCORING: 61.58
HOW SEVERE IS YOUR KNEE STIFFNESS AFTER FIRST WAKING IN MORNING: MILD
GOING UP OR DOWN STAIRS: MODERATE
RISING FROM SITTING: MODERATE
STANDING UPRIGHT: MODERATE

## 2025-03-25 ASSESSMENT — PAIN SCALES - GENERAL: PAINLEVEL_OUTOF10: NO PAIN (0)

## 2025-03-25 NOTE — PATIENT INSTRUCTIONS
"Recommendations from today's MTM visit:                                                         Increase omeprazole to 40mg daily  Recommend taking bupropion at 8am and second dose at 4pm     Follow-up: 8 weeks    It was great speaking with you today.  I value your experience and would be very thankful for your time in providing feedback in our clinic survey. In the next few days, you may receive an email or text message from [employer] with a link to a survey related to your  clinical pharmacist.\"     To schedule another MTM appointment, please call the clinic directly or you may call the MTM scheduling line at [phone] or toll-free at [phone].     My Clinical Pharmacist's contact information:                                                      Please feel free to contact me with any questions or concerns you have.      Liz Calero, Pharm.D, [facility]  Medication Therapy Management Pharmacist      "

## 2025-03-25 NOTE — PROGRESS NOTES
"Virtual Visit Details    Type of service:  Video Visit   Video Start Time: {video visit start/end time for provider to select:366040}  Video End Time:{video visit start/end time for provider to select:910560}    Originating Location (pt. Location): {video visit patient location:020540::\"Home\"}  {PROVIDER LOCATION On-site should be selected for visits conducted from your clinic location or adjoining [facility] hospital, academic office, or other nearby [facility] building. Off-site should be selected for all other provider locations, including home:[address]}  Distant Location (provider location):  {virtual location provider:011787}  Platform used for Video Visit: {Virtual Visit Platforms:225239::\"Magzter\"}    "

## 2025-03-25 NOTE — PATIENT INSTRUCTIONS
Thank you for coming to the [facility] [place].    Plan      Medications:  DECREASE to Bupropion SR (Wellbutrin) 200 mg tablet in morning for depressive symptoms. No script sent.  CONTINUE Paroxetine (Paxil) 40 mg tablet daily for mood and anxiety. No script sent.  CONTINUE Buspirone (Buspar) 30 mg tablet twice daily for anxiety. No script sent.  INCREASE to Cariprazine (Vraylar) 6 mg capsule daily for mood and AVH. Script sent for #30. 1 Refills.  CONTINUE Mirtazapine (Remeron) 7.5 mg tablet at bedtime for depression and sleep. No script sent.  CONTINUE Trazodone (Desyrel) 50 mg a tablet at bedtime as needed for sleep. No script sent.  Continue all other medications per primary care provider.      New Labs/Orders/Referrals: None    Follow up: Schedule an appointment with me in 3-4 weeks or sooner as needed.  Call [facility] at [phone] to schedule    Safety plan reviewed. To the Emergency Department as needed or call after hours crisis line at [phone] or [phone].   [place] Crisis Text Line: Text [place] to 822009 or Suicide LifeLine Chat: suicidepreventionlifeline.org/chat  Follow up with primary care provider as planned or sooner if needed for acute medical concerns.  Call the psychiatric nurse line with medication questions or concerns at [phone].  MyChart may be used to communicate with your provider, but this is not intended to be used for emergencies.    Education and Resources     Resources  National [place] On Mental Illness (SOBEIDA)  [facility]  Improves the lives of children and adults with mental illnesses and their families by providing free classes on mental illnesses and support groups for adults with mental illnesses, parents and family members. For more information:  Phone: [phone]  Toll free: 2-393-YULF-HELPS  Website: www.namihelps.org  [address] [place] [address], [address], [place] [address]    Online go to:  www.[facility].info  Contains information on the community services individuals and communities need to sustain and improve their daily lives--health care and childcare, job training, education and recreation, CHCF, disability and social service information. This directory contains information on nonprofit and public health and human service programs and some for-profit programs such as housing.    Urgent Care for Adult Mental Health   Serving [facility] & [address]   Phone: [phone]    Local Crisis Line Numbers   1. [facility] [phone]  2. Community Outreach Psychiatric Emergencies (COPE) 837.182.1391  3. [facility] 509-438-6981 or 646-605-0491  4. National Suicide Prevention Lifeline 1-584.170.3545 (TALK)    National Crisis Information: Call 988 Suicide and Crisis Lifeline  Crisis Text Line (free 24/7):  call **CRISIS (**195763) Crisis or use the texting option by texting 812055.   National Suicide Prevention Lifeline: Call 988  Poison Control Center: 1-278.365.4412  Trans Lifeline: 1-129.484.7087 - Hotline for transgender people of all ages  The [employer]: 1-409.762.6020 - Hotline for LGBT youth   List of all [facility] resources: https://[place].gov/dhs/people-we-serve/adults/health-care/mental-health/resources/crisis-contacts.jsp    Emergency Walk-In Options:   [facility] @ [facility] ([place]): [phone] - Specialized mental health emergency area designed to be calming  [facility] ([place]): [phone]  [facility] ([place]): [phone]  [facility]): [phone]    For Non-Emergency Support:   Fast Tracker: Mental Health & Substance Use Disorder Resources -   https://www.fasttrackermn.org/    Emergency & Urgently Needed Care:   For emergencies call 911 and/or get medical help right away.      Contact Information     Contact Us:  Please call [phone]  during business hours (8-5:00 M-F).   If you have medication related questions after clinic hours, or on the weekend, please call [phone].     Financial Assistance [phone]   Medical Records [phone]     Medication Refills:  If you need any refills please call your pharmacy and they will contact us. Our fax number for refills is [phone].   Three business days of notice are needed for general medication refill requests.   Five business days of notice are needed for controlled substance refill requests.   If you need to change to a different pharmacy, please contact the new pharmacy directly. The new pharmacy will help you get your medications transferred.     Lab Testing:  If you had lab testing today and your results are reassuring or normal they will be mailed to you or sent through [employer] within 7 days. If the lab tests need quick action we will call you with the results. The phone number we will call with results is # [phone]. If this is not the best number please call our clinic and change the number.     Again thank you for choosing [facility] [place] and please let us know how we can best partner with you to improve you and your family's health.    You may be receiving a survey regarding this appointment. We would love to have your feedback, both positive and negative. The survey is done by an external company, so your answers are anonymous.

## 2025-03-25 NOTE — NURSING NOTE
Holly Muir's chief complaint for this visit includes:  Chief Complaint   Patient presents with    Left Knee - Total Joint Post-op     12wk s/p LTKA 12/18/24        Referring Provider:  Sanford Sheehan MD  [address] [address] [place] [address]    There were no vitals taken for this visit.  Data Unavailable   Global Mental Health Score:    Global Physical Health Score:    PROMIS TOTAL - SUBSCORES:    UCLA: 3-4  KOOS Jr.  61.58  - has balance issues that play a role    Pain increases with: none  Previous surgeries: LTKA 12/18/24  Previous injections within last 6 months: NO  Treatments done: PT - going well, just transition to cane from walker  Imaging completed: na  Pain: 3/10  Concerns: None    Martinez Patel ATC

## 2025-03-25 NOTE — LETTER
3/25/2025      Holly Muir  [address] [address] [address]      Dear Colleague,    Thank you for referring your patient, Holly Muir, to the [facility]. Please see a copy of my visit note below.    Chief Complaint: Total Joint Post-op of the Left Knee (12wk s/p LTKA 12/18/24 )       HPI: Holly Muir returns today in follow-up for her left knee. Reports that she is doing very well. She has come off her walker and is using a cane now and only for balance. ROM at goal. Done with PT. Doing a lot of walking. Happy with how she is doing.     Current Status:  KOOS Jr: 67.58  UCLA:  Global Mental Health Score -    Global Physical Health Score -    PROMIS TOTAL - SUBSCORES -      Medications and allergies are documented in the EMR and have been reviewed.      Current Outpatient Medications:      acetaminophen (TYLENOL) 325 MG tablet, Take 2 tablets (650 mg) by mouth every 4 hours as needed for other (mild pain)., Disp: 100 tablet, Rfl: 0     ASPIRIN LOW DOSE 81 MG chewable tablet, TAKE 1 TABLET BY MOUTH EVERY DAY, Disp: 90 tablet, Rfl: 0     blood glucose monitoring (NO BRAND SPECIFIED) meter device kit, Use to test blood sugar 4  times daily or as directed. Accuceck Guide meter, Disp: 1 kit, Rfl: 1     blood glucose monitoring (ONE TOUCH ULTRA 2) meter device kit, USE TO TEST BLOOD SUGAR 4 TIMES DAILY OR AS DIRECTED. ACCUCECK GUIDE METER, Disp: , Rfl:      blood glucose monitoring (ULTRA THIN 30G) lancets, Use to test blood sugar 4  times daily or as directed.lancets for acchu check guide, Disp: 400 each, Rfl: 3     buPROPion (WELLBUTRIN SR) 200 MG 12 hr tablet, Take 1 tablet (200 mg) by mouth daily (with breakfast)., Disp: 30 tablet, Rfl: 1     busPIRone HCl (BUSPAR) 30 MG tablet, Take 1 tablet (30 mg) by mouth 2 times daily. TAKE 1 TABLET BY MOUTH TWICE A DAY, Disp: 60 tablet, Rfl: 1     carboxymethylcellulose (REFRESH PLUS) 0.5 % SOLN ophthalmic solution, PLACE 1 DROP  INTO BOTH EYES 2 TIMES DAILY AS NEEDED FOR DRY EYES., Disp: 30 mL, Rfl: 11     cariprazine (VRAYLAR) 6 MG capsule, Take 1 capsule (6 mg) by mouth daily., Disp: 30 capsule, Rfl: 1     cetirizine (ZYRTEC) 10 MG tablet, TAKE 1 TABLET (10 MG) BY MOUTH DAILY., Disp: 90 tablet, Rfl: 1     Continuous Glucose  (FREESTYLE CM 2 READER) [facility], 1 DOSE CONTINUOUS, Disp: 1 each, Rfl: 0     Continuous Glucose Sensor (FREESTYLE CM 2 SENSOR) [facility], Use one sensor every 14 days to read blood sugar, Disp: 9 each, Rfl: 3     ferrous gluconate (FERGON) 324 (38 Fe) MG tablet, Take 1 tablet (324 mg) by mouth daily (with breakfast)., Disp: 90 tablet, Rfl: 0     gabapentin (NEURONTIN) 600 MG tablet, TAKE 2 TABLETS (1,200 MG) BY MOUTH 3 TIMES DAILY FOR 30 DAYS, Disp: 180 tablet, Rfl: 5     insulin glargine (LANTUS SOLOSTAR) 100 UNIT/ML pen, INJECT 25 UNITS AT BEDTIME AS DIRECTED + 2 UNITS FOR PRIMING OF PEN., Disp: 30 mL, Rfl: 3     insulin pen needle (31G X 5 MM) 31G X 5 MM miscellaneous, Use four times 4 day times a day, Disp: 200 each, Rfl: 3     ipratropium (ATROVENT) 0.03 % nasal spray, Spray 2 sprays into both nostrils every 12 hours., Disp: 30 mL, Rfl: 3     metFORMIN (GLUCOPHAGE XR) 500 MG 24 hr tablet, Take 2 tablets (1,000 mg) by mouth 2 times daily (with meals). Take two tablets with breakfast and one tablet with dinner for one week then increase to two tablets with breakfast and two tablets with dinner thereafter, Disp: 360 tablet, Rfl: 3     mirtazapine (REMERON) 7.5 MG tablet, Take 1 tablet (7.5 mg) by mouth at bedtime., Disp: 30 tablet, Rfl: 1     MOUNJARO 15 MG/0.5ML SOAJ, Inject 0.5 mLs (15 mg) subcutaneously every 7 days., Disp: 2 mL, Rfl: 3     Multiple Vitamin (DAILY-ISAAC MULTIVITAMIN) TABS, TAKE 1 TABLET BY MOUTH EVERY DAY, Disp: 90 tablet, Rfl: 0     nystatin (MYCOSTATIN) 064735 UNIT/GM external powder, Apply 1 dose topically 3 times daily as needed, Disp: 60 g, Rfl: 3     omeprazole (PRILOSEC) 40 MG DR  capsule, Take 1 capsule (40 mg) by mouth daily., Disp: 90 capsule, Rfl: 0     ONETOUCH ULTRA test strip, USE TO TEST BLOOD SUGAR 4 TIMES DAILY OR AS DIRECTED. ACCU CHECK GUIDE STRIPS, Disp: 400 strip, Rfl: 3     oxyCODONE (ROXICODONE) 5 MG tablet, Take 1 tablet (5 mg) by mouth every 6 hours as needed for severe pain., Disp: 15 tablet, Rfl: 0     PARoxetine (PAXIL) 40 MG tablet, Take 1 tablet (40 mg) by mouth every evening., Disp: 30 tablet, Rfl: 1     pioglitazone (ACTOS) 45 MG tablet, TAKE 1 TABLET BY MOUTH EVERY DAY, Disp: 90 tablet, Rfl: 1     propranolol ER (INDERAL LA) 80 MG 24 hr capsule, TAKE 1 CAPSULE BY MOUTH EVERY DAY, Disp: 90 capsule, Rfl: 2     ramipril (ALTACE) 5 MG capsule, Take 1 capsule (5 mg) by mouth daily., Disp: 90 capsule, Rfl: 1     rOPINIRole (REQUIP) 0.25 MG tablet, TAKE 1 TABLET (0.25 MG) BY MOUTH EVERY EVENING, Disp: 90 tablet, Rfl: 0     simvastatin (ZOCOR) 20 MG tablet, TAKE 1 TABLET BY MOUTH EVERYDAY AT BEDTIME, Disp: 90 tablet, Rfl: 0     traZODone (DESYREL) 50 MG tablet, Take 1 tablet (50 mg) by mouth nightly as needed for sleep., Disp: 30 tablet, Rfl: 1     vitamin C ([employer]S VITAMIN C) 500 MG tablet, 1/2 TABLET BY MOUTH JACOBSON WITH IRON SUPPLEMENT BEFORE MEAL, Disp: 45 tablet, Rfl: 0     VITAMIN D3 25 MCG (1000 UT) tablet, TAKE 1 TABLET BY MOUTH EVERY DAY, Disp: 90 tablet, Rfl: 3    Allergies: Morphine sulfate        Physical Exam:  On physical examination the patient appears the stated age, is in no acute distress, affect is appropriate, and breathing is non-labored.    There were no vitals taken for this visit.  There is no height or weight on file to calculate BMI.    Rises from chair: easily   Gait: walks rapidly with her cane  Appearance: benign  Clinical alignment: neutral   Effusion:no  Extension: 0  Flexion: 120  Patellar tracking:  Collateral ligaments: intact  Stable in in the sagittal plane in mid-flexion.    Assessment: doing well at 12 weeks. Discussed activities, that it is  fine to use an assist device if it helps her balance, follow-up. All the patient's questions were answered to the best of my ability.       Plan: RTC in 12 months, sooner if issues.   Sanford Sheehan      Again, thank you for allowing me to participate in the care of your patient.        Sincerely,        Sanford Sheehan MD    Electronically signed

## 2025-03-25 NOTE — NURSING NOTE
Current patient location: at daughter's home    Is the patient currently in the state of [place]? YES    Visit mode: VIDEO    If the visit is dropped, the patient can be reconnected by:VIDEO VISIT: Text to cell phone:   Telephone Information:   Mobile [phone]       Will anyone else be joining the visit? NO  (If patient encounters technical issues they should call [phone])    Are changes needed to the allergy or medication list? Pt stated no changes to allergies  Patient said Bupropion dose was changed    Are refills needed on medications prescribed by this physician? Discuss with provider    Rooming Documentation:  Questionnaire(s) completed    Reason for visit: RECHECK    Cristiana QUEZADA

## 2025-03-25 NOTE — PROGRESS NOTES
Chief Complaint: Total Joint Post-op of the Left Knee (12wk s/p LTKA 12/18/24 )       HPI: Holly Muir returns today in follow-up for her left knee. Reports that she is doing very well. She has come off her walker and is using a cane now and only for balance. ROM at goal. Done with PT. Doing a lot of walking. Happy with how she is doing.     Current Status:  KOOS Jr: 67.58  [facility]:  Global Mental Health Score -    Global Physical Health Score -    PROMIS TOTAL - SUBSCORES -      Medications and allergies are documented in the EMR and have been reviewed.      Current Outpatient Medications:     acetaminophen (TYLENOL) 325 MG tablet, Take 2 tablets (650 mg) by mouth every 4 hours as needed for other (mild pain)., Disp: 100 tablet, Rfl: 0    ASPIRIN LOW DOSE 81 MG chewable tablet, TAKE 1 TABLET BY MOUTH EVERY DAY, Disp: 90 tablet, Rfl: 0    blood glucose monitoring (NO BRAND SPECIFIED) meter device kit, Use to test blood sugar 4  times daily or as directed. Accuceck Guide meter, Disp: 1 kit, Rfl: 1    blood glucose monitoring (ONE TOUCH ULTRA 2) meter device kit, USE TO TEST BLOOD SUGAR 4 TIMES DAILY OR AS DIRECTED. ACCUCECK GUIDE METER, Disp: , Rfl:     blood glucose monitoring (ULTRA THIN 30G) lancets, Use to test blood sugar 4  times daily or as directed.lancets for acchu check guide, Disp: 400 each, Rfl: 3    buPROPion (WELLBUTRIN SR) 200 MG 12 hr tablet, Take 1 tablet (200 mg) by mouth daily (with breakfast)., Disp: 30 tablet, Rfl: 1    busPIRone HCl (BUSPAR) 30 MG tablet, Take 1 tablet (30 mg) by mouth 2 times daily. TAKE 1 TABLET BY MOUTH TWICE A DAY, Disp: 60 tablet, Rfl: 1    carboxymethylcellulose (REFRESH PLUS) 0.5 % SOLN ophthalmic solution, PLACE 1 DROP INTO BOTH EYES 2 TIMES DAILY AS NEEDED FOR DRY EYES., Disp: 30 mL, Rfl: 11    cariprazine (VRAYLAR) 6 MG capsule, Take 1 capsule (6 mg) by mouth daily., Disp: 30 capsule, Rfl: 1    cetirizine (ZYRTEC) 10 MG tablet, TAKE 1 TABLET (10 MG) BY MOUTH DAILY.,  Disp: 90 tablet, Rfl: 1    Continuous Glucose  (FREESTYLE CM 2 READER) GALINA, 1 DOSE CONTINUOUS, Disp: 1 each, Rfl: 0    Continuous Glucose Sensor (FREESTYLE CM 2 SENSOR) [facility] [place] Use one sensor every 14 days to read blood sugar, Disp: 9 each, Rfl: 3    ferrous gluconate (FERGON) 324 (38 Fe) MG tablet, Take 1 tablet (324 mg) by mouth daily (with breakfast)., Disp: 90 tablet, Rfl: 0    gabapentin (NEURONTIN) 600 MG tablet, TAKE 2 TABLETS (1,200 MG) BY MOUTH 3 TIMES DAILY FOR 30 DAYS, Disp: 180 tablet, Rfl: 5    insulin glargine (LANTUS SOLOSTAR) 100 UNIT/ML pen, INJECT 25 UNITS AT BEDTIME AS DIRECTED + 2 UNITS FOR PRIMING OF PEN., Disp: 30 mL, Rfl: 3    insulin pen needle (31G X 5 MM) 31G X 5 MM miscellaneous, Use four times 4 day times a day, Disp: 200 each, Rfl: 3    ipratropium (ATROVENT) 0.03 % nasal spray, Spray 2 sprays into both nostrils every 12 hours., Disp: 30 mL, Rfl: 3    metFORMIN (GLUCOPHAGE XR) 500 MG 24 hr tablet, Take 2 tablets (1,000 mg) by mouth 2 times daily (with meals). Take two tablets with breakfast and one tablet with dinner for one week then increase to two tablets with breakfast and two tablets with dinner thereafter, Disp: 360 tablet, Rfl: 3    mirtazapine (REMERON) 7.5 MG tablet, Take 1 tablet (7.5 mg) by mouth at bedtime., Disp: 30 tablet, Rfl: 1    MOUNJARO 15 MG/0.5ML SOAJ, Inject 0.5 mLs (15 mg) subcutaneously every 7 days., Disp: 2 mL, Rfl: 3    Multiple Vitamin (DAILY-ISAAC MULTIVITAMIN) TABS, TAKE 1 TABLET BY MOUTH EVERY DAY, Disp: 90 tablet, Rfl: 0    nystatin (MYCOSTATIN) 695916 UNIT/GM external powder, Apply 1 dose topically 3 times daily as needed, Disp: 60 g, Rfl: 3    omeprazole (PRILOSEC) 40 MG DR capsule, Take 1 capsule (40 mg) by mouth daily., Disp: 90 capsule, Rfl: 0    ONETOUCH ULTRA test strip, USE TO TEST BLOOD SUGAR 4 TIMES DAILY OR AS DIRECTED. ACCU CHECK GUIDE STRIPS, Disp: 400 strip, Rfl: 3    oxyCODONE (ROXICODONE) 5 MG tablet, Take 1 tablet (5 mg) by  mouth every 6 hours as needed for severe pain., Disp: 15 tablet, Rfl: 0    PARoxetine (PAXIL) 40 MG tablet, Take 1 tablet (40 mg) by mouth every evening., Disp: 30 tablet, Rfl: 1    pioglitazone (ACTOS) 45 MG tablet, TAKE 1 TABLET BY MOUTH EVERY DAY, Disp: 90 tablet, Rfl: 1    propranolol ER (INDERAL LA) 80 MG 24 hr capsule, TAKE 1 CAPSULE BY MOUTH EVERY DAY, Disp: 90 capsule, Rfl: 2    ramipril (ALTACE) 5 MG capsule, Take 1 capsule (5 mg) by mouth daily., Disp: 90 capsule, Rfl: 1    rOPINIRole (REQUIP) 0.25 MG tablet, TAKE 1 TABLET (0.25 MG) BY MOUTH EVERY EVENING, Disp: 90 tablet, Rfl: 0    simvastatin (ZOCOR) 20 MG tablet, TAKE 1 TABLET BY MOUTH EVERYDAY AT BEDTIME, Disp: 90 tablet, Rfl: 0    traZODone (DESYREL) 50 MG tablet, Take 1 tablet (50 mg) by mouth nightly as needed for sleep., Disp: 30 tablet, Rfl: 1    vitamin C (CVS VITAMIN C) 500 MG tablet, 1/2 TABLET BY MOUTH JACOBSON WITH IRON SUPPLEMENT BEFORE MEAL, Disp: 45 tablet, Rfl: 0    VITAMIN D3 25 MCG (1000 UT) tablet, TAKE 1 TABLET BY MOUTH EVERY DAY, Disp: 90 tablet, Rfl: 3    Allergies: Morphine sulfate        Physical Exam:  On physical examination the patient appears the stated age, is in no acute distress, affect is appropriate, and breathing is non-labored.    There were no vitals taken for this visit.  There is no height or weight on file to calculate BMI.    Rises from chair: easily   Gait: walks rapidly with her cane  Appearance: benign  Clinical alignment: neutral   Effusion:no  Extension: 0  Flexion: 120  Patellar tracking:  Collateral ligaments: intact  Stable in in the sagittal plane in mid-flexion.    Assessment: doing well at 12 weeks. Discussed activities, that it is fine to use an assist device if it helps her balance, follow-up. All the patient's questions were answered to the best of my ability.       Plan: RTC in 12 months, sooner if issues.   Sanford Sheehan

## 2025-03-26 ENCOUNTER — APPOINTMENT (OUTPATIENT)
Dept: GENERAL RADIOLOGY | Facility: CLINIC | Age: 70
End: 2025-03-26
Attending: EMERGENCY MEDICINE
Payer: COMMERCIAL

## 2025-03-26 ENCOUNTER — HOSPITAL ENCOUNTER (OUTPATIENT)
Facility: CLINIC | Age: 70
Setting detail: OBSERVATION
End: 2025-03-26
Attending: EMERGENCY MEDICINE | Admitting: HOSPITALIST
Payer: COMMERCIAL

## 2025-03-26 DIAGNOSIS — Z96.652 S/P TOTAL KNEE ARTHROPLASTY, LEFT: Primary | ICD-10-CM

## 2025-03-26 DIAGNOSIS — E87.1 HYPONATREMIA: ICD-10-CM

## 2025-03-26 DIAGNOSIS — Z79.4 TYPE 2 DIABETES MELLITUS WITH HYPERGLYCEMIA, WITH LONG-TERM CURRENT USE OF INSULIN (H): ICD-10-CM

## 2025-03-26 DIAGNOSIS — S81.012A KNEE LACERATION, LEFT, INITIAL ENCOUNTER: ICD-10-CM

## 2025-03-26 DIAGNOSIS — E11.65 TYPE 2 DIABETES MELLITUS WITH HYPERGLYCEMIA, WITH LONG-TERM CURRENT USE OF INSULIN (H): ICD-10-CM

## 2025-03-26 LAB
ANION GAP SERPL CALCULATED.3IONS-SCNC: 9 MMOL/L (ref 7–15)
ATRIAL RATE - MUSE: 83 BPM
BASOPHILS # BLD AUTO: 0 10E3/UL (ref 0–0.2)
BASOPHILS NFR BLD AUTO: 0 %
BUN SERPL-MCNC: 7.2 MG/DL (ref 8–23)
CALCIUM SERPL-MCNC: 9.2 MG/DL (ref 8.8–10.4)
CHLORIDE SERPL-SCNC: 92 MMOL/L (ref 98–107)
CREAT SERPL-MCNC: 0.78 MG/DL (ref 0.51–0.95)
DIASTOLIC BLOOD PRESSURE - MUSE: NORMAL MMHG
EGFRCR SERPLBLD CKD-EPI 2021: 82 ML/MIN/1.73M2
EOSINOPHIL # BLD AUTO: 0.1 10E3/UL (ref 0–0.7)
EOSINOPHIL NFR BLD AUTO: 2 %
ERYTHROCYTE [DISTWIDTH] IN BLOOD BY AUTOMATED COUNT: 14.5 % (ref 10–15)
GLUCOSE BLDC GLUCOMTR-MCNC: 102 MG/DL (ref 70–99)
GLUCOSE SERPL-MCNC: 145 MG/DL (ref 70–99)
HCO3 SERPL-SCNC: 25 MMOL/L (ref 22–29)
HCT VFR BLD AUTO: 31.1 % (ref 35–47)
HGB BLD-MCNC: 10.4 G/DL (ref 11.7–15.7)
IMM GRANULOCYTES # BLD: 0 10E3/UL
IMM GRANULOCYTES NFR BLD: 0 %
INTERPRETATION ECG - MUSE: NORMAL
LYMPHOCYTES # BLD AUTO: 2.6 10E3/UL (ref 0.8–5.3)
LYMPHOCYTES NFR BLD AUTO: 35 %
MCH RBC QN AUTO: 27.1 PG (ref 26.5–33)
MCHC RBC AUTO-ENTMCNC: 33.4 G/DL (ref 31.5–36.5)
MCV RBC AUTO: 81 FL (ref 78–100)
MONOCYTES # BLD AUTO: 0.5 10E3/UL (ref 0–1.3)
MONOCYTES NFR BLD AUTO: 6 %
NEUTROPHILS # BLD AUTO: 4.2 10E3/UL (ref 1.6–8.3)
NEUTROPHILS NFR BLD AUTO: 56 %
NRBC # BLD AUTO: 0 10E3/UL
NRBC BLD AUTO-RTO: 0 /100
P AXIS - MUSE: 60 DEGREES
PLATELET # BLD AUTO: 385 10E3/UL (ref 150–450)
POTASSIUM SERPL-SCNC: 4.2 MMOL/L (ref 3.4–5.3)
PR INTERVAL - MUSE: 146 MS
QRS DURATION - MUSE: 80 MS
QT - MUSE: 372 MS
QTC - MUSE: 437 MS
R AXIS - MUSE: 58 DEGREES
RBC # BLD AUTO: 3.84 10E6/UL (ref 3.8–5.2)
SODIUM SERPL-SCNC: 126 MMOL/L (ref 135–145)
SYSTOLIC BLOOD PRESSURE - MUSE: NORMAL MMHG
T AXIS - MUSE: 82 DEGREES
VENTRICULAR RATE- MUSE: 83 BPM
WBC # BLD AUTO: 7.4 10E3/UL (ref 4–11)

## 2025-03-26 PROCEDURE — 83036 HEMOGLOBIN GLYCOSYLATED A1C: CPT | Performed by: HOSPITALIST

## 2025-03-26 PROCEDURE — 80048 BASIC METABOLIC PNL TOTAL CA: CPT | Performed by: EMERGENCY MEDICINE

## 2025-03-26 PROCEDURE — 36415 COLL VENOUS BLD VENIPUNCTURE: CPT | Performed by: EMERGENCY MEDICINE

## 2025-03-26 PROCEDURE — 12002 RPR S/N/AX/GEN/TRNK2.6-7.5CM: CPT

## 2025-03-26 PROCEDURE — 73560 X-RAY EXAM OF KNEE 1 OR 2: CPT | Mod: LT

## 2025-03-26 PROCEDURE — 93005 ELECTROCARDIOGRAM TRACING: CPT

## 2025-03-26 PROCEDURE — 85025 COMPLETE CBC W/AUTO DIFF WBC: CPT | Performed by: EMERGENCY MEDICINE

## 2025-03-26 PROCEDURE — 99285 EMERGENCY DEPT VISIT HI MDM: CPT

## 2025-03-26 PROCEDURE — 250N000009 HC RX 250: Performed by: EMERGENCY MEDICINE

## 2025-03-26 PROCEDURE — 82310 ASSAY OF CALCIUM: CPT | Performed by: EMERGENCY MEDICINE

## 2025-03-26 PROCEDURE — 82962 GLUCOSE BLOOD TEST: CPT

## 2025-03-26 RX ADMIN — Medication: at 21:03

## 2025-03-26 ASSESSMENT — ACTIVITIES OF DAILY LIVING (ADL)
ADLS_ACUITY_SCORE: 57

## 2025-03-26 ASSESSMENT — COLUMBIA-SUICIDE SEVERITY RATING SCALE - C-SSRS
6. HAVE YOU EVER DONE ANYTHING, STARTED TO DO ANYTHING, OR PREPARED TO DO ANYTHING TO END YOUR LIFE?: NO
2. HAVE YOU ACTUALLY HAD ANY THOUGHTS OF KILLING YOURSELF IN THE PAST MONTH?: NO
1. IN THE PAST MONTH, HAVE YOU WISHED YOU WERE DEAD OR WISHED YOU COULD GO TO SLEEP AND NOT WAKE UP?: NO

## 2025-03-27 ENCOUNTER — APPOINTMENT (OUTPATIENT)
Dept: GENERAL RADIOLOGY | Facility: CLINIC | Age: 70
End: 2025-03-27
Attending: HOSPITALIST
Payer: COMMERCIAL

## 2025-03-27 ENCOUNTER — APPOINTMENT (OUTPATIENT)
Dept: CT IMAGING | Facility: CLINIC | Age: 70
End: 2025-03-27
Attending: EMERGENCY MEDICINE
Payer: COMMERCIAL

## 2025-03-27 VITALS
TEMPERATURE: 97.5 F | HEART RATE: 82 BPM | OXYGEN SATURATION: 99 % | SYSTOLIC BLOOD PRESSURE: 109 MMHG | RESPIRATION RATE: 18 BRPM | DIASTOLIC BLOOD PRESSURE: 69 MMHG

## 2025-03-27 PROBLEM — S81.012A KNEE LACERATION, LEFT, INITIAL ENCOUNTER: Status: ACTIVE | Noted: 2025-03-27

## 2025-03-27 PROBLEM — E87.1 HYPONATREMIA: Status: ACTIVE | Noted: 2019-01-22

## 2025-03-27 LAB
ANION GAP SERPL CALCULATED.3IONS-SCNC: 11 MMOL/L (ref 7–15)
BUN SERPL-MCNC: 8.8 MG/DL (ref 8–23)
CALCIUM SERPL-MCNC: 9.2 MG/DL (ref 8.8–10.4)
CHLORIDE SERPL-SCNC: 96 MMOL/L (ref 98–107)
CREAT SERPL-MCNC: 0.68 MG/DL (ref 0.51–0.95)
EGFRCR SERPLBLD CKD-EPI 2021: >90 ML/MIN/1.73M2
ERYTHROCYTE [DISTWIDTH] IN BLOOD BY AUTOMATED COUNT: 14.6 % (ref 10–15)
EST. AVERAGE GLUCOSE BLD GHB EST-MCNC: 189 MG/DL
GLUCOSE BLDC GLUCOMTR-MCNC: 157 MG/DL (ref 70–99)
GLUCOSE BLDC GLUCOMTR-MCNC: 163 MG/DL (ref 70–99)
GLUCOSE BLDC GLUCOMTR-MCNC: 196 MG/DL (ref 70–99)
GLUCOSE BLDC GLUCOMTR-MCNC: 201 MG/DL (ref 70–99)
GLUCOSE SERPL-MCNC: 218 MG/DL (ref 70–99)
HBA1C MFR BLD: 8.2 %
HCO3 SERPL-SCNC: 23 MMOL/L (ref 22–29)
HCT VFR BLD AUTO: 30.6 % (ref 35–47)
HGB BLD-MCNC: 10.1 G/DL (ref 11.7–15.7)
MCH RBC QN AUTO: 26.8 PG (ref 26.5–33)
MCHC RBC AUTO-ENTMCNC: 33 G/DL (ref 31.5–36.5)
MCV RBC AUTO: 81 FL (ref 78–100)
PLATELET # BLD AUTO: 367 10E3/UL (ref 150–450)
POTASSIUM SERPL-SCNC: 4.1 MMOL/L (ref 3.4–5.3)
RBC # BLD AUTO: 3.77 10E6/UL (ref 3.8–5.2)
SODIUM SERPL-SCNC: 130 MMOL/L (ref 135–145)
WBC # BLD AUTO: 8.1 10E3/UL (ref 4–11)

## 2025-03-27 PROCEDURE — 250N000013 HC RX MED GY IP 250 OP 250 PS 637: Performed by: HOSPITALIST

## 2025-03-27 PROCEDURE — 99223 1ST HOSP IP/OBS HIGH 75: CPT | Performed by: HOSPITALIST

## 2025-03-27 PROCEDURE — 250N000011 HC RX IP 250 OP 636: Performed by: EMERGENCY MEDICINE

## 2025-03-27 PROCEDURE — 73130 X-RAY EXAM OF HAND: CPT | Mod: LT

## 2025-03-27 PROCEDURE — 85014 HEMATOCRIT: CPT | Performed by: HOSPITALIST

## 2025-03-27 PROCEDURE — 250N000013 HC RX MED GY IP 250 OP 250 PS 637: Performed by: INTERNAL MEDICINE

## 2025-03-27 PROCEDURE — 36415 COLL VENOUS BLD VENIPUNCTURE: CPT | Performed by: HOSPITALIST

## 2025-03-27 PROCEDURE — 82962 GLUCOSE BLOOD TEST: CPT

## 2025-03-27 PROCEDURE — G0378 HOSPITAL OBSERVATION PER HR: HCPCS

## 2025-03-27 PROCEDURE — 96374 THER/PROPH/DIAG INJ IV PUSH: CPT

## 2025-03-27 PROCEDURE — 250N000012 HC RX MED GY IP 250 OP 636 PS 637: Performed by: HOSPITALIST

## 2025-03-27 PROCEDURE — 73700 CT LOWER EXTREMITY W/O DYE: CPT | Mod: LT

## 2025-03-27 PROCEDURE — 80048 BASIC METABOLIC PNL TOTAL CA: CPT | Performed by: HOSPITALIST

## 2025-03-27 RX ORDER — PAROXETINE 40 MG/1
40 TABLET, FILM COATED ORAL ONCE
Status: COMPLETED | OUTPATIENT
Start: 2025-03-27 | End: 2025-03-27

## 2025-03-27 RX ORDER — NALOXONE HYDROCHLORIDE 0.4 MG/ML
0.2 INJECTION, SOLUTION INTRAMUSCULAR; INTRAVENOUS; SUBCUTANEOUS
Status: DISCONTINUED | OUTPATIENT
Start: 2025-03-27 | End: 2025-03-28 | Stop reason: HOSPADM

## 2025-03-27 RX ORDER — PROPRANOLOL HYDROCHLORIDE 80 MG/1
80 CAPSULE, EXTENDED RELEASE ORAL DAILY
Status: DISCONTINUED | OUTPATIENT
Start: 2025-03-27 | End: 2025-03-28 | Stop reason: HOSPADM

## 2025-03-27 RX ORDER — PAROXETINE 20 MG/1
40 TABLET, FILM COATED ORAL EVERY EVENING
Status: DISCONTINUED | OUTPATIENT
Start: 2025-03-27 | End: 2025-03-28 | Stop reason: HOSPADM

## 2025-03-27 RX ORDER — CETIRIZINE HYDROCHLORIDE 10 MG/1
10 TABLET ORAL DAILY
Status: DISCONTINUED | OUTPATIENT
Start: 2025-03-28 | End: 2025-03-28 | Stop reason: HOSPADM

## 2025-03-27 RX ORDER — CEFAZOLIN SODIUM 1 G/3ML
1 INJECTION, POWDER, FOR SOLUTION INTRAMUSCULAR; INTRAVENOUS ONCE
Status: COMPLETED | OUTPATIENT
Start: 2025-03-27 | End: 2025-03-27

## 2025-03-27 RX ORDER — ACETAMINOPHEN 325 MG/1
650 TABLET ORAL EVERY 4 HOURS PRN
Status: DISCONTINUED | OUTPATIENT
Start: 2025-03-27 | End: 2025-03-28 | Stop reason: HOSPADM

## 2025-03-27 RX ORDER — TRAZODONE HYDROCHLORIDE 50 MG/1
50 TABLET ORAL AT BEDTIME
Status: DISCONTINUED | OUTPATIENT
Start: 2025-03-27 | End: 2025-03-28 | Stop reason: HOSPADM

## 2025-03-27 RX ORDER — IPRATROPIUM BROMIDE 21 UG/1
2 SPRAY, METERED NASAL EVERY 12 HOURS
Status: DISCONTINUED | OUTPATIENT
Start: 2025-03-27 | End: 2025-03-28 | Stop reason: HOSPADM

## 2025-03-27 RX ORDER — OXYCODONE HYDROCHLORIDE 5 MG/1
5 TABLET ORAL EVERY 4 HOURS PRN
Status: DISCONTINUED | OUTPATIENT
Start: 2025-03-27 | End: 2025-03-28 | Stop reason: HOSPADM

## 2025-03-27 RX ORDER — ACETAMINOPHEN 325 MG/1
650 TABLET ORAL EVERY 4 HOURS PRN
Status: DISCONTINUED | OUTPATIENT
Start: 2025-03-27 | End: 2025-03-27

## 2025-03-27 RX ORDER — BUPROPION HYDROCHLORIDE 100 MG/1
200 TABLET, EXTENDED RELEASE ORAL
Status: DISCONTINUED | OUTPATIENT
Start: 2025-03-28 | End: 2025-03-28 | Stop reason: HOSPADM

## 2025-03-27 RX ORDER — NALOXONE HYDROCHLORIDE 0.4 MG/ML
0.4 INJECTION, SOLUTION INTRAMUSCULAR; INTRAVENOUS; SUBCUTANEOUS
Status: DISCONTINUED | OUTPATIENT
Start: 2025-03-27 | End: 2025-03-28 | Stop reason: HOSPADM

## 2025-03-27 RX ORDER — LISINOPRIL 20 MG/1
20 TABLET ORAL DAILY
Status: DISCONTINUED | OUTPATIENT
Start: 2025-03-27 | End: 2025-03-28 | Stop reason: HOSPADM

## 2025-03-27 RX ORDER — GABAPENTIN 300 MG/1
1200 CAPSULE ORAL ONCE
Status: COMPLETED | OUTPATIENT
Start: 2025-03-27 | End: 2025-03-27

## 2025-03-27 RX ORDER — FERROUS GLUCONATE 324(38)MG
324 TABLET ORAL
Status: DISCONTINUED | OUTPATIENT
Start: 2025-03-28 | End: 2025-03-27

## 2025-03-27 RX ORDER — AMOXICILLIN 250 MG
1 CAPSULE ORAL 2 TIMES DAILY PRN
Status: DISCONTINUED | OUTPATIENT
Start: 2025-03-27 | End: 2025-03-28 | Stop reason: HOSPADM

## 2025-03-27 RX ORDER — ROPINIROLE 0.25 MG/1
0.25 TABLET, FILM COATED ORAL EVERY EVENING
Status: DISCONTINUED | OUTPATIENT
Start: 2025-03-27 | End: 2025-03-28 | Stop reason: HOSPADM

## 2025-03-27 RX ORDER — MIRTAZAPINE 7.5 MG/1
7.5 TABLET, FILM COATED ORAL AT BEDTIME
Status: DISCONTINUED | OUTPATIENT
Start: 2025-03-27 | End: 2025-03-28 | Stop reason: HOSPADM

## 2025-03-27 RX ORDER — ACETAMINOPHEN 650 MG/1
650 SUPPOSITORY RECTAL EVERY 4 HOURS PRN
Status: DISCONTINUED | OUTPATIENT
Start: 2025-03-27 | End: 2025-03-28 | Stop reason: HOSPADM

## 2025-03-27 RX ORDER — NICOTINE POLACRILEX 4 MG
15-30 LOZENGE BUCCAL
Status: DISCONTINUED | OUTPATIENT
Start: 2025-03-27 | End: 2025-03-28 | Stop reason: HOSPADM

## 2025-03-27 RX ORDER — DEXTROSE MONOHYDRATE 25 G/50ML
25-50 INJECTION, SOLUTION INTRAVENOUS
Status: DISCONTINUED | OUTPATIENT
Start: 2025-03-27 | End: 2025-03-28 | Stop reason: HOSPADM

## 2025-03-27 RX ORDER — AMOXICILLIN 250 MG
2 CAPSULE ORAL 2 TIMES DAILY PRN
Status: DISCONTINUED | OUTPATIENT
Start: 2025-03-27 | End: 2025-03-28 | Stop reason: HOSPADM

## 2025-03-27 RX ORDER — ROPINIROLE 0.25 MG/1
0.25 TABLET, FILM COATED ORAL ONCE
Status: COMPLETED | OUTPATIENT
Start: 2025-03-27 | End: 2025-03-27

## 2025-03-27 RX ORDER — PANTOPRAZOLE SODIUM 40 MG/1
40 TABLET, DELAYED RELEASE ORAL DAILY
Status: DISCONTINUED | OUTPATIENT
Start: 2025-03-27 | End: 2025-03-28 | Stop reason: HOSPADM

## 2025-03-27 RX ORDER — ONDANSETRON 2 MG/ML
4 INJECTION INTRAMUSCULAR; INTRAVENOUS EVERY 6 HOURS PRN
Status: DISCONTINUED | OUTPATIENT
Start: 2025-03-27 | End: 2025-03-28 | Stop reason: HOSPADM

## 2025-03-27 RX ORDER — PROCHLORPERAZINE MALEATE 5 MG/1
5 TABLET ORAL EVERY 6 HOURS PRN
Status: DISCONTINUED | OUTPATIENT
Start: 2025-03-27 | End: 2025-03-28 | Stop reason: HOSPADM

## 2025-03-27 RX ORDER — ONDANSETRON 4 MG/1
4 TABLET, ORALLY DISINTEGRATING ORAL EVERY 6 HOURS PRN
Status: DISCONTINUED | OUTPATIENT
Start: 2025-03-27 | End: 2025-03-28 | Stop reason: HOSPADM

## 2025-03-27 RX ORDER — GABAPENTIN 600 MG/1
1200 TABLET ORAL 3 TIMES DAILY
Status: DISCONTINUED | OUTPATIENT
Start: 2025-03-27 | End: 2025-03-28 | Stop reason: HOSPADM

## 2025-03-27 RX ORDER — BUSPIRONE HYDROCHLORIDE 15 MG/1
30 TABLET ORAL ONCE
Status: COMPLETED | OUTPATIENT
Start: 2025-03-27 | End: 2025-03-27

## 2025-03-27 RX ORDER — BUSPIRONE HYDROCHLORIDE 15 MG/1
30 TABLET ORAL 2 TIMES DAILY
Status: DISCONTINUED | OUTPATIENT
Start: 2025-03-27 | End: 2025-03-28 | Stop reason: HOSPADM

## 2025-03-27 RX ADMIN — GABAPENTIN 1200 MG: 600 TABLET, FILM COATED ORAL at 21:54

## 2025-03-27 RX ADMIN — BUSPIRONE HYDROCHLORIDE 30 MG: 15 TABLET ORAL at 03:59

## 2025-03-27 RX ADMIN — TRAZODONE HYDROCHLORIDE 50 MG: 50 TABLET ORAL at 21:54

## 2025-03-27 RX ADMIN — PAROXETINE 40 MG: 40 TABLET, FILM COATED ORAL at 03:59

## 2025-03-27 RX ADMIN — OXYCODONE HYDROCHLORIDE 5 MG: 5 TABLET ORAL at 21:59

## 2025-03-27 RX ADMIN — INSULIN ASPART 1 UNITS: 100 INJECTION, SOLUTION INTRAVENOUS; SUBCUTANEOUS at 14:52

## 2025-03-27 RX ADMIN — GABAPENTIN 1200 MG: 600 TABLET, FILM COATED ORAL at 15:36

## 2025-03-27 RX ADMIN — INSULIN ASPART 1 UNITS: 100 INJECTION, SOLUTION INTRAVENOUS; SUBCUTANEOUS at 18:00

## 2025-03-27 RX ADMIN — PROPRANOLOL HYDROCHLORIDE 80 MG: 80 CAPSULE, EXTENDED RELEASE ORAL at 21:59

## 2025-03-27 RX ADMIN — OXYCODONE HYDROCHLORIDE 5 MG: 5 TABLET ORAL at 14:52

## 2025-03-27 RX ADMIN — GABAPENTIN 1200 MG: 300 CAPSULE ORAL at 03:58

## 2025-03-27 RX ADMIN — MIRTAZAPINE 7.5 MG: 7.5 TABLET, FILM COATED ORAL at 21:54

## 2025-03-27 RX ADMIN — BUSPIRONE HYDROCHLORIDE 30 MG: 15 TABLET ORAL at 21:53

## 2025-03-27 RX ADMIN — PROPRANOLOL HYDROCHLORIDE 80 MG: 80 CAPSULE, EXTENDED RELEASE ORAL at 18:00

## 2025-03-27 RX ADMIN — INSULIN GLARGINE 25 UNITS: 100 INJECTION, SOLUTION SUBCUTANEOUS at 23:58

## 2025-03-27 RX ADMIN — PAROXETINE HYDROCHLORIDE 40 MG: 20 TABLET, FILM COATED ORAL at 21:54

## 2025-03-27 RX ADMIN — ROPINIROLE HYDROCHLORIDE 0.25 MG: 0.25 TABLET, FILM COATED ORAL at 21:54

## 2025-03-27 RX ADMIN — CEFAZOLIN 1 G: 1 INJECTION, POWDER, FOR SOLUTION INTRAMUSCULAR; INTRAVENOUS at 03:06

## 2025-03-27 RX ADMIN — ROPINIROLE HYDROCHLORIDE 0.25 MG: 0.25 TABLET, FILM COATED ORAL at 03:59

## 2025-03-27 RX ADMIN — IPRATROPIUM BROMIDE 2 SPRAY: 21 SPRAY, METERED NASAL at 22:00

## 2025-03-27 ASSESSMENT — ACTIVITIES OF DAILY LIVING (ADL)
ADLS_ACUITY_SCORE: 61
ADLS_ACUITY_SCORE: 57
ADLS_ACUITY_SCORE: 61
ADLS_ACUITY_SCORE: 57
ADLS_ACUITY_SCORE: 57
ADLS_ACUITY_SCORE: 61
ADLS_ACUITY_SCORE: 61
ADLS_ACUITY_SCORE: 57
ADLS_ACUITY_SCORE: 61
ADLS_ACUITY_SCORE: 57
ADLS_ACUITY_SCORE: 61
ADLS_ACUITY_SCORE: 57
ADLS_ACUITY_SCORE: 61
ADLS_ACUITY_SCORE: 57
ADLS_ACUITY_SCORE: 61

## 2025-03-27 NOTE — PLAN OF CARE
Goal Outcome Evaluation:  Pt is Aox4, denies pain, waiting on ortho consult. VSS on RA. Discharge pending.

## 2025-03-27 NOTE — PLAN OF CARE
Goal Outcome Evaluation:       Pt come from ED @ 5:30 AM, A&Ox4, VSS on RA, Brace on, laceration to the L. Knee due to a mechanical fall. Denied SOB and is B/G checks.

## 2025-03-27 NOTE — PROGRESS NOTES
RECEIVING UNIT ED HANDOFF REVIEW    ED Nurse Handoff Report was reviewed by: Jorge Mariscal RN on March 27, 2025 at 4:27 AM

## 2025-03-27 NOTE — PROGRESS NOTES
Goal Outcome Evaluation:       Observation goals  PRIOR TO DISCHARGE       Comments:   -diagnostic tests and consults completed and resulted: not met  -vital signs normal or at patient baseline: met    Nurse to notify provider when observation goals have been met and patient is ready for discharge.      Shift Summary [id number]    Admitting Diagnosis: Hyponatremia [E87.1]  Knee laceration, left, initial encounter [S81.012A]   Vitals WNL  Pain 8/10. Taking oxy PRN.  A&Ox4  Voiding WNL in the BR  Mobility AO1/SBA GBW  CMS intact  Lung Sounds clear on RA  GI WNL  Dressing dried on dressing over laceration site.    Orders Placed This Encounter      Regular Diet Adult       Plan: Ortho to see.

## 2025-03-27 NOTE — ED PROVIDER NOTES
Emergency Department Note      History of Present Illness     Chief Complaint   Fall      HPI   Holly Muir is a 70 year old female with a history of cervical cancer, type 2 diabetes mellitus, hypertension and hyperlipidemia presenting after a mechanical fall with a left knee laceration. As per the patient's family, she fell forwards whilst walking out of her hotel room earlier today, hurting her left wrist and left knee. She was able to get up on her own; however, began feeling dizzy and lightheaded. She went into her room to change into [facility] and felt blood on her leg, which is when she noticed the laceration. Upon evaluation, patient endorses some leg numbness. Her daughters voice concerns as patient has ha bilateral TKAs, with her most recent one on her left leg (12/18/24). Patient denies LOC, head trauma, abdominal pain or foot pain.     Independent Historian   Family as detailed above.    Review of External Notes       Past Medical History     Medical History and Problem List   Past Medical History:   Diagnosis Date    Bilateral knee pain     Cataracts, both eyes 05/08/2013    Cervical cancer (H)     Chronic kidney disease, stage 3a (H) 11/17/2021    Depressive disorder 2005?    Diabetes     Diabetic retinopathy (H)     History of blood transfusion 2000    HTN     Inflammatory arthritis     Major depression     Memory loss     Nephropathy     OA (osteoarthritis) of knee 09/11/2013    Other and unspecified hyperlipidemia     Pterygium eye     Sacral nerve root injury        Medications   acetaminophen (TYLENOL) 325 MG tablet  ASPIRIN LOW DOSE 81 MG chewable tablet  blood glucose monitoring (NO BRAND SPECIFIED) meter device kit  blood glucose monitoring (ONE TOUCH ULTRA 2) meter device kit  blood glucose monitoring (ULTRA THIN 30G) lancets  buPROPion (WELLBUTRIN SR) 200 MG 12 hr tablet  busPIRone HCl (BUSPAR) 30 MG tablet  carboxymethylcellulose (REFRESH PLUS) 0.5 % SOLN ophthalmic solution  cariprazine  (VRAYLAR) 6 MG capsule  cetirizine (ZYRTEC) 10 MG tablet  Continuous Glucose  (FREESTYLE CM 2 READER) GALINA  Continuous Glucose Sensor (FREESTYLE CM 2 SENSOR) MISC  ferrous gluconate (FERGON) 324 (38 Fe) MG tablet  gabapentin (NEURONTIN) 600 MG tablet  insulin glargine (LANTUS SOLOSTAR) 100 UNIT/ML pen  insulin pen needle (31G X 5 MM) 31G X 5 MM miscellaneous  ipratropium (ATROVENT) 0.03 % nasal spray  metFORMIN (GLUCOPHAGE XR) 500 MG 24 hr tablet  mirtazapine (REMERON) 7.5 MG tablet  MOUNJARO 15 MG/0.5ML SOAJ  Multiple Vitamin (DAILY-ISAAC MULTIVITAMIN) TABS  nystatin (MYCOSTATIN) 083553 UNIT/GM external powder  omeprazole (PRILOSEC) 40 MG DR capsule  ONETOUCH ULTRA test strip  oxyCODONE (ROXICODONE) 5 MG tablet  PARoxetine (PAXIL) 40 MG tablet  pioglitazone (ACTOS) 45 MG tablet  propranolol ER (INDERAL LA) 80 MG 24 hr capsule  ramipril (ALTACE) 5 MG capsule  rOPINIRole (REQUIP) 0.25 MG tablet  simvastatin (ZOCOR) 20 MG tablet  traZODone (DESYREL) 50 MG tablet  vitamin C (CVS VITAMIN C) 500 MG tablet  VITAMIN D3 25 MCG (1000 UT) tablet        Surgical History   Past Surgical History:   Procedure Laterality Date    ARTHROPLASTY KNEE Right 10/02/2023    Procedure: ARTHROPLASTY, RIGHT KNEE, TOTAL;  Surgeon: Sanford Sheehan MD;  Location: [facility]    ARTHROPLASTY KNEE Left 12/18/2024    Procedure: Left total knee arthroplasty;  Surgeon: Sanford Sheehan MD;  [facility]: [facility]    ARTHROSCOPY KNEE RT/LT      LT    BIOPSY  1975    Lumb onleft side of breast    BLEPHAROPLASTY BILATERAL Bilateral 08/16/2019    Procedure: BILATERAL UPPER LID BLEPHAROPLASTY;  Surgeon: Randolph Cook MD;  Location: [facility]    BREAST LUMPECTOMY, RT/LT      LT-benign     CATARACT IOL, RT/LT      COLONOSCOPY  05/10/2012    Procedure:COLONOSCOPY; screening colonoscopy; Surgeon:MILLA VEGA; Location:[facility]    COLONOSCOPY WITH CO2 INSUFFLATION N/A 08/02/2019    Procedure: Colonoscopy with CO2 insufflation;  Surgeon: Sussy  Himanshu Morton MD;  Location: [facility]    COLOSTOMY  2000    COLOSTOMY      HYSTERECTOMY, PAP NO LONGER INDICATED      done in [place]-cervical cancer    IMPLANT STIMULATOR SACRAL NERVE STAGE ONE Right 03/10/2015    Procedure: IMPLANT STIMULATOR SACRAL NERVE STAGE ONE;  Surgeon: Teodora Yusuf MD;  [facility]    IMPLANT STIMULATOR SACRAL NERVE STAGE TWO Right 03/31/2015    Procedure: IMPLANT STIMULATOR SACRAL NERVE STAGE TWO;  Surgeon: Teodora Yusuf MD;  [facility]    IMPLANT STIMULATOR SACRAL NERVE STAGE TWO Right 06/22/2020    Procedure: INSERTION, SACRAL NERVE STIMULATOR, STAGE 2 (replacement of interstim battery);  Surgeon: Teodora Yusuf MD;  Location: [facility]    INJECT EPIDURAL TRANSFORAMINAL Right 01/20/2023    Procedure: Right Lumbar 5-Sacral 1 Transforaminal Epidural Steroid Injection with fluoroscopic guidance and contrast;  Surgeon: Kulwinder Booth MD;  [facility]    INJECT EPIDURAL TRANSFORAMINAL Right 03/23/2023    Procedure: Right Lumbar 5-Sacral 1 Transforaminal Epidural Steroid Injection with fluoroscopic guidance and contrast.;  Surgeon: Kulwinder Booth MD;  Location[facility]    PHACOEMULSIFICATION WITH STANDARD INTRAOCULAR LENS IMPLANT Left 09/26/2019    Procedure: LEFT PHACOEMULSIFICATION, CATARACT, WITH STANDARD IOL INSERTION;  Surgeon: Francesco Winn MD;  [facility]    PHACOEMULSIFICATION WITH STANDARD INTRAOCULAR LENS IMPLANT Right 10/24/2019    Procedure: RIGHT PHACOEMULSIFICATION, CATARACT, WITH STANDARD IOL INSERTION;  Surgeon: Francesco Winn MD;  [facility]    REPAIR PTOSIS Bilateral 08/16/2019    SALPINGO OOPHORECTOMY,R/L/JENNIFER      Salpingo Oophorectomy, RT/LT/JENNIFER    ZZC EXCIS PTERYGIUM  10/2008    [facility]    [facility] STOMACH SURGERY PROCEDURE UNLISTED      ZZ COLONOSCOPY THRU STOMA, DIAGNOSTIC  2002    normal per report-no records available-records destroyed       Physical Exam     Patient Vitals for the past 24 hrs:   BP Temp Temp  src Pulse Resp SpO2   03/26/25 2030 (!) 158/66 97.6  F (36.4  C) Temporal 79 16 95 %     Physical Exam  Constitutional:       Appearance: She is obese.   HENT:      Mouth/Throat:      Mouth: Mucous membranes are moist.   Eyes:      Pupils: Pupils are equal, round, and reactive to light.   Cardiovascular:      Rate and Rhythm: Normal rate.   Pulmonary:      Effort: Pulmonary effort is normal.   Abdominal:      General: Abdomen is flat. Bowel sounds are normal.   Musculoskeletal:      Comments: Left knee there is a 3-1/2 cm backward 7 like laceration that includes the midline incision from her knee replacement.  Knee replacement wound is dehisced.  There are visualized visual subcutaneous sutures that are gaping.  There is no exposed bone.  There is no clear violation of the joint.  There is no foreign body   Skin:     General: Skin is warm.   Neurological:      Mental Status: She is alert.           Diagnostics     Lab Results   Labs Ordered and Resulted from Time of ED Arrival to Time of ED Departure   GLUCOSE BY METER - Abnormal       Result Value    GLUCOSE BY METER POCT 102 (*)    BASIC METABOLIC PANEL       Imaging   CT Knee Left w/o Contrast   Final Result   IMPRESSION:   1.  Large left knee effusion with a midline anterior skin defect which may represent laceration or dehiscence of the surgical wound. No intra-articular gas or evidence of fracture.            XR Knee Left 1/2 Views   Final Result   IMPRESSION: Left TKA. No periprosthetic fracture or evidence of loosening. 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 Prepatellar soft tissue swelling. Soft tissue laceration or small locule of superficial soft tissue gas about    the anterior knee. Probable knee joint effusion.      XR Hand Left G/E 3 Views    (Results Pending)       EKG   ECG taken at 2151, ECG read at 2152  Sinus rhythm   Cannot rule out Inferior infarct , age undetermined   Abnormal ECG    Rate 83 bpm. SD interval 146  ms. QRS duration 80 ms. QT/QTc 372/437 ms. P-R-T axes 60 58 82.    Independent Interpretation   X-ray of left knee shows knee joint effusion    ED Course      Medications Administered   Medications   lido-EPINEPHrine-tetracaine (LET) topical gel GEL ( Topical $Given 3/26/25 2103)       Procedures   Procedures     Laceration Repair      Procedure: Laceration Repair    Indication: Laceration    Consent: Verbal    Tetanus status reviewed, need to be updated.     Location: Left Lower extremity     Length: 3.5 cm    Preparation: Irrigation with Sterile Saline.    Anesthesia/Sedation: Lidocaine with Epinephrine - 1%      Treatment/Exploration: Wound explored, no foreign bodies found     Closure: The wound was closed with one layer. Skin/superficial layer was closed with 15 x 4-0 Nylon using Interrupted sutures.     Patient Status: The patient tolerated the procedure well: Yes. There were no complications.     Discussion of Management   Admitting Hospitalist,    Orthopedics, Dr. Aaron    ED Course   ED Course as of 03/26/25 2245   Wed Mar 26, 2025   2038 I obtained the history and examined the patient as noted above.     2149 I spoke with Dr. Aaron regarding this patient.    2155 I rechecked the patient and explained findings.        Additional Documentation  None    Medical Decision Making / Diagnosis     CMS Diagnoses: None    MIPS       None    MDM   Holly Muir is a 70 year old female who presents for dizziness and mechanical fall on the left knee.  No clear head injury obvious wound to the left knee.  Able to weight-bear.  Wound is directly over her kneecap and distal knee.  X-rays confirm no articular arthrotomy with air in the joint and no fracture other than an old fracture of the patella.  Care was discussed with Dr. Dial from orthopedics.  Who recommended a CT scan for assessment this was performed and does identify a large effusion but no arthrotomy concerns by CT scan.  No clear fractures.  Wound was  anesthetized and repaired by me using single layer closure.  Feel uncomfortable due to patient's hyponatremia but also the concerns for new knee effusion and dehiscence of her skin surface of her new knee.  Recommended empiric prophylactic antibiotics observation admission and orthopedic consult it was agreed that no further interventions are required could be discharged tomorrow without further treatment.  Care was discussed with the hospitalist and was admitted on observation    Disposition   The patient was discharged.     Diagnosis     ICD-10-CM    1. Knee laceration, left, initial encounter  S81.012A       2. Hyponatremia  E87.1            Discharge Medications   New Prescriptions    No medications on file     Scribe Disclosure:  I, Helene Stoner, am serving as a scribe at 8:40 PM on 3/26/2025 to document services personally performed by Jose Haque MD based on my observations and the provider's statements to me.        Jose Haque MD  03/27/25 0228

## 2025-03-27 NOTE — PROGRESS NOTES
Observation goals  PRIOR TO DISCHARGE       Comments:   -diagnostic tests and consults completed and resulted: not met  -vital signs normal or at patient baseline: met    Nurse to notify provider when observation goals have been met and patient is ready for discharge.

## 2025-03-27 NOTE — PHARMACY-ADMISSION MEDICATION HISTORY
Pharmacist Admission Medication History    Admission medication history is complete. The information provided in this note is only as accurate as the sources available at the time of the update.    Information Source(s): Patient, Family member, Hospital records, and CareEverywhere/SureScripts via in-person    Pertinent Information: None    Changes made to PTA medication list:  Added: None  Deleted: None  Changed: Aspirin from 81mg to 162mg      Medication History Completed By: Freddy Bahena [facility] 3/27/2025 4:15 PM    PTA Med List   Medication Sig Note Last Dose/Taking    acetaminophen (TYLENOL) 325 MG tablet Take 2 tablets (650 mg) by mouth every 4 hours as needed for other (mild pain).  Unknown    ASPIRIN LOW DOSE 81 MG chewable tablet TAKE 1 TABLET BY MOUTH EVERY DAY (Patient taking differently: Take 162 mg by mouth daily.)  3/26/2025    buPROPion (WELLBUTRIN SR) 200 MG 12 hr tablet Take 1 tablet (200 mg) by mouth daily (with breakfast).  3/26/2025    busPIRone HCl (BUSPAR) 30 MG tablet Take 1 tablet (30 mg) by mouth 2 times daily. TAKE 1 TABLET BY MOUTH TWICE A DAY  3/26/2025    carboxymethylcellulose (REFRESH PLUS) 0.5 % SOLN ophthalmic solution PLACE 1 DROP INTO BOTH EYES 2 TIMES DAILY AS NEEDED FOR DRY EYES.  Unknown    cariprazine (VRAYLAR) 6 MG capsule Take 1 capsule (6 mg) by mouth daily.  3/26/2025    cetirizine (ZYRTEC) 10 MG tablet TAKE 1 TABLET (10 MG) BY MOUTH DAILY.  3/26/2025    ferrous gluconate (FERGON) 324 (38 Fe) MG tablet Take 1 tablet (324 mg) by mouth daily (with breakfast).  3/26/2025    gabapentin (NEURONTIN) 600 MG tablet TAKE 2 TABLETS (1,200 MG) BY MOUTH 3 TIMES DAILY FOR 30 DAYS  3/26/2025    insulin glargine (LANTUS SOLOSTAR) 100 UNIT/ML pen INJECT 25 UNITS AT BEDTIME AS DIRECTED + 2 UNITS FOR PRIMING OF PEN.  3/26/2025    insulin pen needle (31G X 5 MM) 31G X 5 MM miscellaneous Use four times 4 day times a day  3/26/2025    ipratropium (ATROVENT) 0.03 % nasal spray Spray 2 sprays into  both nostrils every 12 hours.  3/26/2025    metFORMIN (GLUCOPHAGE XR) 500 MG 24 hr tablet Take 2 tablets (1,000 mg) by mouth 2 times daily (with meals). Take two tablets with breakfast and one tablet with dinner for one week then increase to two tablets with breakfast and two tablets with dinner thereafter  3/26/2025    mirtazapine (REMERON) 7.5 MG tablet Take 1 tablet (7.5 mg) by mouth at bedtime.  3/26/2025    MOUNJARO 15 MG/0.5ML SOAJ Inject 0.5 mLs (15 mg) subcutaneously every 7 days. 3/27/2025: Mondays 3/24/2025    Multiple Vitamin (DAILY-ISAAC MULTIVITAMIN) TABS TAKE 1 TABLET BY MOUTH EVERY DAY  3/26/2025    nystatin (MYCOSTATIN) 315134 UNIT/GM external powder Apply 1 dose topically 3 times daily as needed  Unknown    omeprazole (PRILOSEC) 40 MG DR capsule Take 1 capsule (40 mg) by mouth daily.  Taking    ONETOUCH ULTRA test strip USE TO TEST BLOOD SUGAR 4 TIMES DAILY OR AS DIRECTED. ACCU CHECK GUIDE STRIPS  Unknown    oxyCODONE (ROXICODONE) 5 MG tablet Take 1 tablet (5 mg) by mouth every 6 hours as needed for severe pain.  Unknown    PARoxetine (PAXIL) 40 MG tablet Take 1 tablet (40 mg) by mouth every evening.  3/26/2025    pioglitazone (ACTOS) 45 MG tablet TAKE 1 TABLET BY MOUTH EVERY DAY  3/26/2025    propranolol ER (INDERAL LA) 80 MG 24 hr capsule TAKE 1 CAPSULE BY MOUTH EVERY DAY  3/26/2025    ramipril (ALTACE) 5 MG capsule Take 1 capsule (5 mg) by mouth daily.  3/26/2025    rOPINIRole (REQUIP) 0.25 MG tablet TAKE 1 TABLET (0.25 MG) BY MOUTH EVERY EVENING  3/26/2025    simvastatin (ZOCOR) 20 MG tablet TAKE 1 TABLET BY MOUTH EVERYDAY AT BEDTIME  3/26/2025    traZODone (DESYREL) 50 MG tablet Take 1 tablet (50 mg) by mouth nightly as needed for sleep.  3/26/2025    vitamin C ([employer]S VITAMIN C) 500 MG tablet 1/2 TABLET BY MOUTH JACOBSON WITH IRON SUPPLEMENT BEFORE MEAL  3/26/2025    VITAMIN D3 25 MCG (1000 UT) tablet TAKE 1 TABLET BY MOUTH EVERY DAY  3/26/2025

## 2025-03-27 NOTE — ED TRIAGE NOTES
Patient fell walking down that enriquez in her hotel. Patients cane or shoes got stuck and fell face forward. Patient did not hit her head, did not blackout and is not on blood thinners. Patient had surgery on December 18 on her left knee and started bleeding. Bleeding controlled. Also complaining of left hand pain.

## 2025-03-27 NOTE — ED NOTES
[facility]  ED Nurse Handoff Report    ED Chief complaint: Fall      ED Diagnosis:   Final diagnoses:   Knee laceration, left, initial encounter   Hyponatremia       Code Status: Full Code    Allergies:   Allergies   Allergen Reactions    Morphine Sulfate Itching       Patient Story:  70 year old female with a past medical history of type 2 diabetes, chronic pain syndrome, schizophrenia, hyponatremia presents to hospital after mechanical fall. The patient was walking with her cane at a motel when she tripped and fell to the ground landing on her left side. She hit her knee and her hand. She denies any head trauma or loss of consciousness. She had no prodromal symptoms prior to her fall. After the fall she felt lightheaded and weak when trying to get back up, but reports that her symptoms have since resolved. She denies any recent medication changes. She denies any recent fevers, chills, chest pain, palpitations, shortness of breath, nausea, vomiting, diarrhea, dysuria.     Focused Assessment:  Alert, moves all extremities, no focal deficits. Room air. Normal rate and rhythm. Left hand swelling and left lower extremity with brace.      Treatments and/or interventions provided: PIV, labs, imaging laceration repair with sutures  Patient's response to treatments and/or interventions: Stable    To be done/followed up on inpatient unit:  Follow the plan of care    Does this patient have any cognitive concerns?:  NA    Activity level - Baseline/Home:  Unknown  Activity Level - Current:   Unknown    Patient's Preferred language: English   Needed?: No    Isolation: None  Infection: Not Applicable  Patient tested for COVID 19 prior to admission: NO  Bariatric?: No    Vital Signs:   Vitals:    03/26/25 2030   BP: (!) 158/66   Pulse: 79   Resp: 16   Temp: 97.6  F (36.4  C)   TempSrc: Temporal   SpO2: 95%       CFor the majority of the shift this patient's behavior was Green.   Behavioral  interventions performed were NA.    ED NURSE PHONE NUMBER: [phone]

## 2025-03-27 NOTE — PROGRESS NOTES
Observation Goals:  -diagnostic tests and consults completed and resulted: No waiting on ortho consult   -vital signs normal or at patient baseline: Yes  Nurse to notify provider when observation goals have been met and patient is ready for discharge.

## 2025-03-27 NOTE — H&P
[facility]  Hospitalist History and Physical  Date of Admission:  3/26/2025    Primary Care Physician   Tiffany King    Chief Complaint  Fall    History obtained from the patient    History of Present Illness   Holly Muir is a 70 year old female with a past medical history of type 2 diabetes, chronic pain syndrome, schizophrenia, hyponatremia presents to hospital after mechanical fall. The patient was walking with her cane at a motel when she tripped and fell to the ground landing on her left side. She hit her knee and her hand. She denies any head trauma or loss of consciousness. She had no prodromal symptoms prior to her fall. After the fall she felt lightheaded and weak when trying to get back up, but reports that her symptoms have since resolved.   She denies any recent medication changes. She denies any recent fevers, chills, chest pain, palpitations, shortness of breath, nausea, vomiting, diarrhea, dysuria.       Past Medical History    I have reviewed this patient's medical history and updated it with pertinent information if needed.   Past Medical History:   Diagnosis Date    Bilateral knee pain     Cataracts, both eyes 05/08/2013    Cervical cancer (H)     Chronic kidney disease, stage 3a (H) 11/17/2021    Depressive disorder 2005?    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       Past Surgical History   I have reviewed this patient's surgical history and updated it with pertinent information if needed.  Past Surgical History:   Procedure Laterality Date    ARTHROPLASTY KNEE Right 10/02/2023    Procedure: ARTHROPLASTY, RIGHT KNEE, TOTAL;  Surgeon: Sanford Sheehan MD;  [facility]    ARTHROPLASTY KNEE Left  12/18/2024    Procedure: Left total knee arthroplasty;  Surgeon: Sanford Sheehan MD;  [facility] OR    ARTHROSCOPY KNEE RT/LT      LT    BIOPSY  1975    Lumb onleft side of breast    BLEPHAROPLASTY BILATERAL Bilateral 08/16/2019    Procedure: BILATERAL UPPER LID BLEPHAROPLASTY;  Surgeon: Randolph Cook MD;  Location: [facility] OR    BREAST LUMPECTOMY, RT/LT      LT-benign     CATARACT IOL, RT/LT      COLONOSCOPY  05/10/2012    Procedure:COLONOSCOPY; screening colonoscopy; Surgeon:MILLA VEGA; Location:[facility] OR    COLONOSCOPY WITH CO2 INSUFFLATION N/A 08/02/2019    Procedure: Colonoscopy with CO2 insufflation;  Surgeon: Himanshu Hi MD;  Location: [facility] OR    COLOSTOMY  2000    COLOSTOMY      HYSTERECTOMY, PAP NO LONGER INDICATED      done in [place]-cervical cancer    IMPLANT STIMULATOR SACRAL NERVE STAGE ONE Right 03/10/2015    Procedure: IMPLANT STIMULATOR SACRAL NERVE STAGE ONE;  Surgeon: Teodora Yusuf MD;  [facility]    IMPLANT STIMULATOR SACRAL NERVE STAGE TWO Right 03/31/2015    Procedure: IMPLANT STIMULATOR SACRAL NERVE STAGE TWO;  Surgeon: Teodora Yusuf MD;  [facility]    IMPLANT STIMULATOR SACRAL NERVE STAGE TWO Right 06/22/2020    Procedure: INSERTION, SACRAL NERVE STIMULATOR, STAGE 2 (replacement of interstim battery);  Surgeon: Teodora Yusuf MD;  Location: [facility] OR    INJECT EPIDURAL TRANSFORAMINAL Right 01/20/2023    Procedure: Right Lumbar 5-Sacral 1 Transforaminal Epidural Steroid Injection with fluoroscopic guidance and contrast;  Surgeon: Kulwinder Booth MD;  Location: [facility]    INJECT EPIDURAL TRANSFORAMINAL Right 03/23/2023    Procedure: Right Lumbar 5-Sacral 1 Transforaminal Epidural Steroid Injection with fluoroscopic guidance and contrast.;  Surgeon: Kulwinder Booth MD;  Location: [facility] OR    PHACOEMULSIFICATION WITH STANDARD INTRAOCULAR LENS IMPLANT Left 09/26/2019    Procedure: LEFT PHACOEMULSIFICATION, CATARACT, WITH STANDARD IOL INSERTION;  Surgeon:  Francesco Winn MD;  [facility]: [facility]    PHACOEMULSIFICATION WITH STANDARD INTRAOCULAR LENS IMPLANT Right 10/24/2019    Procedure: RIGHT PHACOEMULSIFICATION, CATARACT, WITH STANDARD IOL INSERTION;  Surgeon: Francesco Winn MD;  [facility]: [facility]    REPAIR PTOSIS Bilateral 08/16/2019    SALPINGO OOPHORECTOMY,R/L/JENNIFER      Salpingo Oophorectomy, RT/LT/JENNIFER    ZZC EXCIS PTERYGIUM  10/2008    Jayne Eye    ZZC STOMACH SURGERY PROCEDURE UNLISTED      [facility] COLONOSCOPY THRU STOMA, DIAGNOSTIC  2002    normal per report-no records available-records destroyed       Allergies   Allergies   Allergen Reactions    Morphine Sulfate Itching       Social History   I have reviewed this patient's social history and updated it with pertinent information if needed. Holly Muir  reports that she quit smoking about 50 years ago. Her smoking use included cigarettes. She has never been exposed to tobacco smoke. She has never used smokeless tobacco. She reports that she does not currently use alcohol. She reports that she does not use drugs.    Family History   I have reviewed this patient's family history and updated it with pertinent information if needed.   Family History   Problem Relation Age of Onset    Diabetes Mother         Natie    Cerebrovascular Disease Mother     Hypertension Mother     Diabetes Father     Hypertension Father     Cancer Maternal Grandfather     Cancer Paternal Grandfather     Diabetes Brother         Ayden    Diabetes Sister         Tomby    Thyroid Disease Daughter     Thyroid Disease Sister     Multiple Sclerosis Daughter     Glaucoma No family hx of     Macular Degeneration No family hx of        Physical Exam   Temp: 97.6  F (36.4  C) Temp src: Temporal BP: (!) 158/66 Pulse: 79   Resp: 16 SpO2: 95 % O2 Device: None (Room air)    Vital Signs with Ranges  Temp:  [97.6  F (36.4  C)] 97.6  F (36.4  C)  Pulse:  [79] 79  Resp:  [16] 16  BP: (158)/(66) 158/66  SpO2:  [95 %] 95 %  0 lbs 0  oz  Physical Exam  Vitals reviewed.   Constitutional:       Appearance: Normal appearance.      Comments: Pleasant elderly lady seen resting in bed in no apparent distress.    HENT:      Head: Normocephalic and atraumatic.   Eyes:      Extraocular Movements: Extraocular movements intact.      Conjunctiva/sclera: Conjunctivae normal.      Pupils: Pupils are equal, round, and reactive to light.   Cardiovascular:      Rate and Rhythm: Normal rate and regular rhythm.   Pulmonary:      Effort: Pulmonary effort is normal.      Breath sounds: Normal breath sounds.   Abdominal:      General: Abdomen is flat. Bowel sounds are normal.      Palpations: Abdomen is soft.   Musculoskeletal:      Comments: Left hand swelling with tenderness to palpation. No deformity noted.   Left lower extremity dressed and in a brace   Neurological:      General: No focal deficit present.      Mental Status: She is alert and oriented to person, place, and time. Mental status is at baseline.         Assessment & Plan   Holly Muir is a 70 year old female with a past medical history of type 2 diabetes, chronic pain syndrome, schizophrenia, hyponatremia presents to hospital after mechanical fall.    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  Laceration repaired in the er  Ortho consult    Acute on chronic hyponatremia  Hyponatremia has been attributed to polydipsia in the past.  She has improved with fluid restriction.  Could also be some underlying SIADH.  Could be medication induced given her multiple psychiatric medications.  Fluid restriction 1.8L per day  Follow up repeat chemistry in the morning    Anemia  Stable and chronic    Chronic pain syndrome  Oxycodone prn    Type 2 diabetes  Diabetic retinopathy  Diabetic neuropathy  A1c 7.7% 12/2024  Insulin sliding  scale  Hypoglycemia protocol  Lantus 25 qpm  Holding pta metformin      Chronic stable medical conditions: resume pta meds as needed  Schizoaffective disorder  OCD  Anxiety  Depression  Restless leg syndrome  Insomnia  GERD  Status post partial colectomy with colostomy  Hypertension  Dyslipidemia    DVT ppx: scd  Code Status: full code  Medically Ready for Discharge: Anticipated Tomorrow    Medical Decision Making       80 MINUTES SPENT BY ME on the date of service doing chart review, history, exam, documentation & further activities per the note.      Rose Mcgrath MD  Hospitalist Medicine Service  Pager# [phone]

## 2025-03-28 ENCOUNTER — PATIENT OUTREACH (OUTPATIENT)
Dept: GERIATRIC MEDICINE | Facility: CLINIC | Age: 70
End: 2025-03-28
Payer: COMMERCIAL

## 2025-03-28 VITALS
SYSTOLIC BLOOD PRESSURE: 127 MMHG | RESPIRATION RATE: 18 BRPM | HEART RATE: 81 BPM | OXYGEN SATURATION: 96 % | TEMPERATURE: 97.8 F | DIASTOLIC BLOOD PRESSURE: 65 MMHG

## 2025-03-28 LAB
ANION GAP SERPL CALCULATED.3IONS-SCNC: 10 MMOL/L (ref 7–15)
BUN SERPL-MCNC: 13.9 MG/DL (ref 8–23)
CALCIUM SERPL-MCNC: 9 MG/DL (ref 8.8–10.4)
CHLORIDE SERPL-SCNC: 96 MMOL/L (ref 98–107)
CREAT SERPL-MCNC: 0.81 MG/DL (ref 0.51–0.95)
EGFRCR SERPLBLD CKD-EPI 2021: 78 ML/MIN/1.73M2
GLUCOSE BLDC GLUCOMTR-MCNC: 144 MG/DL (ref 70–99)
GLUCOSE SERPL-MCNC: 251 MG/DL (ref 70–99)
HCO3 SERPL-SCNC: 24 MMOL/L (ref 22–29)
POTASSIUM SERPL-SCNC: 4.1 MMOL/L (ref 3.4–5.3)
SODIUM SERPL-SCNC: 130 MMOL/L (ref 135–145)

## 2025-03-28 PROCEDURE — 80048 BASIC METABOLIC PNL TOTAL CA: CPT | Performed by: INTERNAL MEDICINE

## 2025-03-28 PROCEDURE — 36415 COLL VENOUS BLD VENIPUNCTURE: CPT | Performed by: INTERNAL MEDICINE

## 2025-03-28 PROCEDURE — G0378 HOSPITAL OBSERVATION PER HR: HCPCS

## 2025-03-28 PROCEDURE — 250N000013 HC RX MED GY IP 250 OP 250 PS 637: Performed by: INTERNAL MEDICINE

## 2025-03-28 PROCEDURE — 82962 GLUCOSE BLOOD TEST: CPT

## 2025-03-28 PROCEDURE — 250N000013 HC RX MED GY IP 250 OP 250 PS 637: Performed by: HOSPITALIST

## 2025-03-28 PROCEDURE — G0463 HOSPITAL OUTPT CLINIC VISIT: HCPCS

## 2025-03-28 RX ADMIN — BUPROPION HYDROCHLORIDE 200 MG: 100 TABLET, FILM COATED, EXTENDED RELEASE ORAL at 08:09

## 2025-03-28 RX ADMIN — GABAPENTIN 1200 MG: 600 TABLET, FILM COATED ORAL at 08:10

## 2025-03-28 RX ADMIN — IPRATROPIUM BROMIDE 2 SPRAY: 21 SPRAY, METERED NASAL at 08:09

## 2025-03-28 RX ADMIN — BUSPIRONE HYDROCHLORIDE 30 MG: 15 TABLET ORAL at 08:09

## 2025-03-28 RX ADMIN — CETIRIZINE HYDROCHLORIDE 10 MG: 10 TABLET, FILM COATED ORAL at 08:10

## 2025-03-28 RX ADMIN — INSULIN ASPART 2 UNITS: 100 INJECTION, SOLUTION INTRAVENOUS; SUBCUTANEOUS at 13:26

## 2025-03-28 RX ADMIN — INSULIN ASPART 1 UNITS: 100 INJECTION, SOLUTION INTRAVENOUS; SUBCUTANEOUS at 08:09

## 2025-03-28 RX ADMIN — PANTOPRAZOLE SODIUM 40 MG: 40 TABLET, DELAYED RELEASE ORAL at 08:10

## 2025-03-28 RX ADMIN — ACETAMINOPHEN 650 MG: 325 TABLET ORAL at 13:25

## 2025-03-28 ASSESSMENT — ACTIVITIES OF DAILY LIVING (ADL)
ADLS_ACUITY_SCORE: 63
ADLS_ACUITY_SCORE: 63
ADLS_ACUITY_SCORE: 59
ADLS_ACUITY_SCORE: 59
ADLS_ACUITY_SCORE: 61
ADLS_ACUITY_SCORE: 61
DEPENDENT_IADLS:: CLEANING;COOKING;LAUNDRY;SHOPPING;MEAL PREPARATION;MEDICATION MANAGEMENT;TRANSPORTATION
ADLS_ACUITY_SCORE: 61
ADLS_ACUITY_SCORE: 59
ADLS_ACUITY_SCORE: 59
ADLS_ACUITY_SCORE: 61
ADLS_ACUITY_SCORE: 59
ADLS_ACUITY_SCORE: 59
ADLS_ACUITY_SCORE: 65
ADLS_ACUITY_SCORE: 61
ADLS_ACUITY_SCORE: 59
ADLS_ACUITY_SCORE: 61
ADLS_ACUITY_SCORE: 59
ADLS_ACUITY_SCORE: 59

## 2025-03-28 NOTE — PROVIDER NOTIFICATION
Paged Dr. Rao, can we please get a WOC consult for the patients ostomy bag? Thanks     WOC consult placed

## 2025-03-28 NOTE — PROGRESS NOTES
Mental Status: A/O x4  Activity/dangle: Assist x 1 w/ GB and walker   Diet: Regular    Pain: PRN oxy given   Bhatt/Voiding: Has colostomy bag and uses bathroom   02/LDA: On RA, PIV on left lower arm, SL  D/C Date: Pending   Other Info: has blood sugar checks. Has 1800 fluid restriction

## 2025-03-28 NOTE — PROGRESS NOTES
TRANSITIONS OF CARE (TASNEEM) LOG    TASNEEM tasks should be completed by the CC within one (1) business day of notification of each transition. Follow up contact with member is required after return to their usual care setting.  Note:  If CC finds out about the transitions fifteen (15) days or more after the member has returned to their usual care setting, no TASNEEM log is needed. However, the CC should check in with the member to discuss the transition process, any changes needed to the care plan and document it in a case note.     Member Name:  [facility]olly [facility] [id number]   [place] [facility] [place] Coord[place] Contact:  Malathi Bacon RN, PHN Agency/County/Care System: [facility]   Transition Communication Actions from Care Management Contact   Transition #1   Notification Date: 3/29/25 Transition Date:   03/26/25 Transition From: Home     Is this the member s usual care setting?               yes Transition To: [facility]   Transition Type:  Unplanned    Documentation from conversation with the member/responsible party, provider, discharging and receiving facility:   Date: 3/29/25: Received notification of admission to hospital with dx of fall with laceration to the left knee  CC contacted Hospital /discharge planner,   ( [phone] for Name and Phone Number) and left a message with this CC contact information, reviewed community support plan as well requested to be notified of concerns, care conferences and discharge planning.  CC reached out to member regarding transition and offered support as needed.  Reviewed and update support plan as needed.  Notified community service providers and placed services PCA on hold as needed.  Transition log initiated.   PCP, Tiffany King, notified of hospitalization via EMR.  Malathi Bacon RN PHN  Care Coordinator  [facility]  O:  [phone]  C:[phone]  F:[phone]     Transition #2   Notification Date: 4/1/25 Transition Date:   3/28/25 Transition From: [facility] [place]     Is this the member s usual care setting?               no Transition To: Home   Transition Type:  Planned    Documentation from conversation with the member/responsible party, provider, discharging and receiving facility:   Date: 4/1/25: Received notification of transition to home.  CC contacted member and reviewed discharge summary.  Member has a follow-up appointment with PCP in 7 days: Yes: scheduled on 4/1/25    Member has had a change in condition: No  Home visit needed: No  Support Plan reviewed and updated.  The following home based services PCA were resumed.  New referrals placed: Yes: RN Therapies: PT  Transition log completed.   PCP, Tiffany King, notified of transition back to home via EMR.  Malathi Bacon RN PHN  Care Coordinator  [facility]  O: [phone]  C:[phone]  F:[phone]       *RETURN TO USUAL CARE SETTING: *Complete tasks below when the member is discharging TO their usual care setting within one (1) business day of notification..      For situations where the Care Coordinator is notified of the discharge prior to the date of discharge, the Care Coordinator must follow up with the member or designated representative to confirm that discharge actually occurred and discuss required TASNEEM tasks as outlined in the TASNEEM Instructions.  (This includes situations where it may be a  new  usual care setting for the member. (i.e., a community member who decides upon permanent nursing home placement following hospitalization and rehab).    Discuss with Member/Responsible Party:    Check  Yes  - if the member, family member and/or SNF/facility staff manages the following:    If  No  provide explanation in the comments section.          Date completed: 4/1/25 Communicated with member or their designated  representative about the following:  care transition process; about changes to the member s health status; plan of care updates; education about transitions and how to prevent unplanned transitions/readmissions    Four Pillars for Optimal Transition:    Check  Yes  - if the member, family member and/or SNF/facility staff manages the following:    If  No  provide explanation in the comments section.          [x]  Yes     []  No Does the member have a follow-up appointment scheduled with primary care or specialist? (Mental health hospitalizations--the appt. should be w/in 7 days)              For mental health hospitalizations:  []  Yes     []  No     Does the member have a follow-up appointment scheduled with a mental health practitioner within 7 days of discharge?  [x]  Yes     []  No     Has a medication review been completed with member? If no, refer to PCP, home care nurse, MTM, pharmacist  [x]  Yes     []  No     Can the member manage their medications or is there a system in place to manage medications (e.g. home care set-up)?         [x]  Yes     []  No     Can the member verbalize warning signs and symptoms to watch for and how to respond?  [x]  Yes     []  No     Does the member have a copy of and understand their discharge instructions?  If no, assist to obtain copy of discharge instructions, review discharge instructions, and assist to contact PCP to discuss questions about their recent hospitalization.  [x]  Yes     []  No     Does the member have adequate food, housing and transportation?  If no, add goal and discuss additional supports available to the member                                                                                                                                                                                 [x]  Yes     []  No     Is the member safe in their home?  If no, document needs and support provided                                                                                                                                                                           []  Yes     [x]  No     Are there any concerns of vulnerability, abuse, or neglect?  If yes, document concerns and actions taken by Care Coordinator as a mandated                                                                                                                                                                              [x]  Yes     []  No     Does the member use a Personal Health Care Record?  Check  Yes  if visit summary, discharge summary, and/or healthcare summary are being used as a PHR.                                                                                                                                                                                  [x]  Yes     []  No     Have you reviewed the discharge summary with the member? If  No  provide explanation in comments.  [x]  Yes     []  No     Have you updated the member s care plan/support plan? Add new diagnosis, medications, treatments, goals & interventions, as applicable. If No, provide explanation in comments.    Comments:           Notes from conversation with the member/responsible party, provider, discharging and receiving facility (as applicable): CATRACHO Bacon RN PHN  Care Coordinator  [facility]  O: [phone]  C:[phone]  F:[phone]

## 2025-03-28 NOTE — PLAN OF CARE
Goal Outcome Evaluation:      Plan of Care Reviewed With: patient          Outcome Evaluation: Care Management will follow for discharge planning.

## 2025-03-28 NOTE — PROGRESS NOTES
Care Management Discharge Note    Discharge Date: 03/28/2025       Discharge Disposition: Home Care, Home    Discharge Services: Home Care    Discharge DME:  no    Discharge Transportation: family or friend will provide    Private pay costs discussed: Not applicable    Does the patient's insurance plan have a 3 day qualifying hospital stay waiver?  Yes     Which insurance plan 3 day waiver is available? Alternative insurance waiver    Will the waiver be used for post-acute placement? Undetermined at this time        Persons Notified of Discharge Plans: Patient  Patient/Family in Agreement with the Plan: yes    Handoff Referral Completed: Yes, FRANK PCP: Internal handoff referral completed    Additional Information:  Discharge plan for patient is [facility] for Skilled RN visit after she discharges.  Initial referral sent for C RN visit.  Pending at time of discharge.  Accepting agency will call patient for visit after discharge.    Addendum 1557: [facility] is the home care agency and  discharge home care orders were faxed via EPIC.      Shellie Whiting, Care Management RN, [facility]   Contact: via [employer]

## 2025-03-28 NOTE — PROGRESS NOTES
Observation Goals:  -diagnostic tests and consults completed and resulted: No waiting on ortho consult   -vital signs normal or at patient baseline: Yes  Nurse to notify provider when observation goals have been met and patient is ready for discharge

## 2025-03-28 NOTE — PLAN OF CARE
Goal Outcome Evaluation:  Pt is Aox4, up independent in room, BG checks sliding scale administered per orders. Ortho to see patient today. Pain controlled with PRN tylenol given x1. Ostomy in place manages independently, also has a BG monitor in left upper arm. Discharge pending ortho recommendations.

## 2025-03-28 NOTE — Clinical Note
You are receiving this message because this member is on the state government program [facility] [facility] or [facility] [facility].  are required to notify the primary care physician with all ED visits, admissions and discharges from hospitals and nursing homes. If you have further questions please feel free to contact me.  Malathi Bacon RN [facility] Coordinator [phone]

## 2025-03-28 NOTE — CONSULTS
Orthopedic Consultation    Holly Muir MRN# [id number]   Age: 70 year old YOB: 1955     Date of Admission:  3/26/2025    Reason for consult: wound dehiscence          Requesting physician: Rose Mcgrath MD          Level of consult: One-time consult to assist in determining a diagnosis and to recommend an appropriate treatment plan           Assessment and Plan:   Assessment:   S/p L TKA 12/18/24 w/ Sanfodr Sheehan ([facility])    70-year-old F presented to the emergency department on 3/26/2025, after mechanical fall earlier that day.  She sustained laceration, dehiscence of her surgical wound at that time.  She was evaluated at the emergency department where XR of the left knee was obtained along with CT imaging.  Dr. Aaron, orthopedic trauma surgery attending, was contacted via phone at that time and reviewed imaging and made recommendations emergency department.  At that time there is no gas seen on the CT, no new fractures or no periprosthetic fracture.  Dr. Jin recommended emergency room provider to close laceration and for the patient to discharge home and follow-up with Dr. Sheehan in clinic.    I was contacted by bedside nurse on 3/28/2025 as an Ortho consult was placed after hospital admission, orthopedic team is not aware.  Upon consultation family is concerned about fracture seen on imaging, and the reason for admit.  Upon imaging there is a chronic nonunion facture that has been present since before surgery.  There are no new fractures.  I will discussed with family that this fracture has been present as they have been working with Dr. Sheehan and that there are no new recommendations      Plan:   No new recommendations per Ortho.  No indication for surgical intervention.  Patient may discharge home and follow-up with Dr. Sheehan.  She may be weightbearing as tolerated with knee immobilizer to protect the new dehiscence which has been closed  with sutures in the ER.  I placed a new dressing on the wound today with Xeroform, gauze, Tegaderm.  I discussed with family to follow-up in clinic with Dr. Sheehan.  Patient is Ortho stable and may discharge from our standpoint.  Orthopedic trauma team at [facility] will sign off.           Chief Complaint:   Fall, s/p TKA (12/18/24). Now with wound dehiscence.          History of Present Illness:   Holly Muir is a 70 year old female with a history of cervical cancer, type 2 diabetes mellitus, hypertension and hyperlipidemia presenting after a mechanical fall with a left knee laceration. As per the patient's family, she fell forwards whilst walking out of her hotel room earlier today, hurting her left wrist and left knee.           Past Medical History:     Past Medical History:   Diagnosis Date    Bilateral knee pain     Cataracts, both eyes 05/08/2013    Cervical cancer (H)     Chronic kidney disease, stage 3a (H) 11/17/2021    Depressive disorder 2005?    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             Past Surgical History:     Past Surgical History:   Procedure Laterality Date    ARTHROPLASTY KNEE Right 10/02/2023    Procedure: ARTHROPLASTY, RIGHT KNEE, TOTAL;  Surgeon: Sanford Sheehan MD;  Location: [facility]    ARTHROPLASTY KNEE Left 12/18/2024    Procedure: Left total knee arthroplasty;  Surgeon: Sanford Sheehan MD;  [facility]    ARTHROSCOPY KNEE RT/LT      LT    BIOPSY  1975    Lumb onleft side of breast    BLEPHAROPLASTY BILATERAL Bilateral 08/16/2019    Procedure: BILATERAL UPPER LID BLEPHAROPLASTY;  Surgeon: Randolph Cook MD;  Location: [facility] OR    BREAST LUMPECTOMY, RT/LT      LT-benign     CATARACT IOL, RT/LT       COLONOSCOPY  05/10/2012    Procedure:COLONOSCOPY; screening colonoscopy; Surgeon:MILLA VEGA; Location:[facility] OR    COLONOSCOPY WITH CO2 INSUFFLATION N/A 08/02/2019    Procedure: Colonoscopy with CO2 insufflation;  Surgeon: Himanshu Hi MD;  Location: [facility]    COLOSTOMY  2000    COLOSTOMY      HYSTERECTOMY, PAP NO LONGER INDICATED      done in [place]-cervical cancer    IMPLANT STIMULATOR SACRAL NERVE STAGE ONE Right 03/10/2015    Procedure: IMPLANT STIMULATOR SACRAL NERVE STAGE ONE;  Surgeon: Teodora Yusuf MD;  [facility]    IMPLANT STIMULATOR SACRAL NERVE STAGE TWO Right 03/31/2015    Procedure: IMPLANT STIMULATOR SACRAL NERVE STAGE TWO;  Surgeon: Teodora Yusuf MD;  [facility]    IMPLANT STIMULATOR SACRAL NERVE STAGE TWO Right 06/22/2020    Procedure: INSERTION, SACRAL NERVE STIMULATOR, STAGE 2 (replacement of interstim battery);  Surgeon: Teodora Yusuf MD;  Location: [facility]    INJECT EPIDURAL TRANSFORAMINAL Right 01/20/2023    Procedure: Right Lumbar 5-Sacral 1 Transforaminal Epidural Steroid Injection with fluoroscopic guidance and contrast;  Surgeon: Kulwinder Booth MD;  Location[facility]    INJECT EPIDURAL TRANSFORAMINAL Right 03/23/2023    Procedure: Right Lumbar 5-Sacral 1 Transforaminal Epidural Steroid Injection with fluoroscopic guidance and contrast.;  Surgeon: Kulwinder Booth MD;  Location: [facility]    PHACOEMULSIFICATION WITH STANDARD INTRAOCULAR LENS IMPLANT Left 09/26/2019    Procedure: LEFT PHACOEMULSIFICATION, CATARACT, WITH STANDARD IOL INSERTION;  Surgeon: Francesco Winn MD;  [facility]    PHACOEMULSIFICATION WITH STANDARD INTRAOCULAR LENS IMPLANT Right 10/24/2019    Procedure: RIGHT PHACOEMULSIFICATION, CATARACT, WITH STANDARD IOL INSERTION;  Surgeon: Francesco Winn MD;  [facility]: [facility]    REPAIR PTOSIS Bilateral 08/16/2019    SALPINGO OOPHORECTOMY,R/L/JENNIFER      Salpingo Oophorectomy, RT/LT/JENNIFER    ZZC EXCIS PTERYGIUM  10/2008     [facility]    C STOMACH SURGERY PROCEDURE UNLISTED      [facility] COLONOSCOPY THRU STOMA, DIAGNOSTIC      normal per report-no records available-records destroyed             Social History:     Social History     Tobacco Use    Smoking status: Former     Current packs/day: 0.00     Types: Cigarettes     Quit date: 1974     Years since quittin.2     Passive exposure: Never    Smokeless tobacco: Never   Substance Use Topics    Alcohol use: Not Currently     Comment: social occasions             Family History:     Family History   Problem Relation Age of Onset    Diabetes Mother         Natie    Cerebrovascular Disease Mother     Hypertension Mother     Diabetes Father     Hypertension Father     Cancer Maternal Grandfather     Cancer Paternal Grandfather     Diabetes Brother         Ayden    Diabetes Sister         Tomby    Thyroid Disease Daughter     Thyroid Disease Sister     Multiple Sclerosis Daughter     Glaucoma No family hx of     Macular Degeneration No family hx of              Immunizations:     VACCINE/DOSE   Diptheria   DPT   DTAP   HBIG   Hepatitis A   Hepatitis B   HIB   Influenza   Measles   Meningococcal   MMR   Mumps   Pneumococcal   Polio   Rubella   Small Pox   TDAP   Varicella   Zoster             Allergies:     Allergies   Allergen Reactions    Morphine Sulfate Itching             Medications:     Current Facility-Administered Medications   Medication Dose Route Frequency Provider Last Rate Last Admin    acetaminophen (TYLENOL) tablet 650 mg  650 mg Oral Q4H PRN Rose Mcgrath MD   650 mg at 25 1325    Or    acetaminophen (TYLENOL) Suppository 650 mg  650 mg Rectal Q4H PRN Rose Mcgrath MD        buPROPion (WELLBUTRIN SR) 12 hr tablet 200 mg  200 mg Oral Daily with breakfast Helio Rao MD   200 mg at 25 0809    busPIRone (BUSPAR) tablet 30 mg  30 mg Oral BID Helio Rao MD   30 mg at 25 0809    cetirizine (zyrTEC) tablet 10 mg  10 mg  Oral Daily Helio Rao MD   10 mg at 03/28/25 0810    glucose gel 15-30 g  15-30 g Oral Q15 Min PRN Rose Mcgrath MD        Or    dextrose 50 % injection 25-50 mL  25-50 mL Intravenous Q15 Min PRN Rose Mcgrath MD        Or    glucagon injection 1 mg  1 mg Subcutaneous Q15 Min PRN Rose Mcgrath MD        gabapentin (NEURONTIN) tablet 1,200 mg  1,200 mg Oral TID Helio Rao MD   1,200 mg at 03/28/25 0810    insulin aspart (NovoLOG) injection (RAPID ACTING)  1-3 Units Subcutaneous TID AC Rose Mcgrath MD   2 Units at 03/28/25 1326    insulin aspart (NovoLOG) injection (RAPID ACTING)  1-3 Units Subcutaneous At Bedtime Rose Mcgrath MD        insulin glargine (LANTUS PEN) injection 25 Units  25 Units Subcutaneous At Bedtime Rose Mcgrath MD   25 Units at 03/27/25 2358    ipratropium (ATROVENT) 0.03 % spray 2 spray  2 spray Both Nostrils Q12H Helio Rao MD   2 spray at 03/28/25 0809    [Held by provider] lisinopril (ZESTRIL) tablet 20 mg  20 mg Oral Daily Helio Rao MD        melatonin tablet 1 mg  1 mg Oral At Bedtime PRN Rose Mcgrath MD        mirtazapine (REMERON) tablet TABS 7.5 mg  7.5 mg Oral At Bedtime Rose Mcgrath MD   7.5 mg at 03/27/25 2154    naloxone (NARCAN) injection 0.2 mg  0.2 mg Intravenous Q2 Min PRN Rose Mcgrath MD        Or    naloxone (NARCAN) injection 0.4 mg  0.4 mg Intravenous Q2 Min PRN Rose Mcgrath MD        Or    naloxone (NARCAN) injection 0.2 mg  0.2 mg Intramuscular Q2 Min PRN Rose Mcgrath MD        Or    naloxone (NARCAN) injection 0.4 mg  0.4 mg Intramuscular Q2 Min PRN Rose Mcgrath MD        ondansetron (ZOFRAN ODT) ODT tab 4 mg  4 mg Oral Q6H PRN Rose Mcgrath MD        Or    ondansetron (ZOFRAN) injection 4 mg  4 mg Intravenous Q6H PRN Rose Mcgrath MD        oxyCODONE (ROXICODONE) tablet 5 mg  5 mg Oral Q4H  PRRose Sanabria MD   5 mg at 03/27/25 2159    oxyCODONE IR (ROXICODONE) half-tab 2.5 mg  2.5 mg Oral Q4H PRN Rose Mcgrath MD        pantoprazole (PROTONIX) EC tablet 40 mg  40 mg Oral Daily Helio Rao MD   40 mg at 03/28/25 0810    PARoxetine (PAXIL) tablet 40 mg  40 mg Oral QPM Helio Rao MD   40 mg at 03/27/25 2154    prochlorperazine (COMPAZINE) injection 5 mg  5 mg Intravenous Q6H PRN Rose Mcgrath MD        Or    prochlorperazine (COMPAZINE) tablet 5 mg  5 mg Oral Q6H PRN Rose Mcgrath MD        propranolol ER (INDERAL LA) 24 hr capsule 80 mg  80 mg Oral Daily Helio Rao MD   80 mg at 03/27/25 2159    rOPINIRole (REQUIP) tablet 0.25 mg  0.25 mg Oral QPM Helio Rao MD   0.25 mg at 03/27/25 2154    senna-docusate (SENOKOT-S/PERICOLACE) 8.6-50 MG per tablet 1 tablet  1 tablet Oral BID PRN Rose Mcgrath MD        Or    senna-docusate (SENOKOT-S/PERICOLACE) 8.6-50 MG per tablet 2 tablet  2 tablet Oral BID PRN Rose Mcgrath MD        traZODone (DESYREL) tablet 50 mg  50 mg Oral At Bedtime Rose Mcgrath MD   50 mg at 03/27/25 2154               Physical Exam:   All vitals have been reviewed  Patient Vitals for the past 24 hrs:   BP Temp Temp src Pulse Resp SpO2   03/28/25 1259 127/84 98.5  F (36.9  C) Oral 74 16 --   03/28/25 0846 111/60 97.7  F (36.5  C) Oral 76 18 98 %   03/27/25 2150 109/69 97.5  F (36.4  C) Oral 80 18 99 %   03/27/25 1604 (!) 124/95 98.5  F (36.9  C) Oral 82 18 --       Intake/Output Summary (Last 24 hours) at 3/28/2025 1456  Last data filed at 3/28/2025 0851  Gross per 24 hour   Intake 720 ml   Output 175 ml   Net 545 ml       Constitutional: Pleasant, alert, appropriate, following commands.  HEENT: Head atraumatic normocephalic.   Respiratory: Unlabored breathing no audible wheeze  Cardiovascular: Regular rate and rhythm per pulses.  GI: Abdomen is non-distended.  Lymph/Hematologic: No  lymphadenopathy in areas examined.  Musculoskeletal: Left knee dressing is clean dry and intact.  Dressing is removed and reveals newly obtained laceration and wound dehiscence of the distal end of surgical scar.  This is accommodation of laceration and dehiscence and is C-shaped.  The wound is well-approximated.  Sutures intact.  No drainage or surrounding erythema.  Patient is immobilized in a knee immobilizer.  Sensation intact in all distributions.  Left lower extremity is neurovascularly intact         Data:   All laboratory data reviewed  Results for orders placed or performed during the hospital encounter of 03/26/25   XR Knee Left 1/2 Views     Status: None    Narrative    EXAM: XR KNEE LEFT 1/2 VIEWS  LOCATION: [facility]  DATE: 3/26/2025    INDICATION: fall knee injury  COMPARISON: 01/11/2025      Impression    IMPRESSION: Left TKA. No periprosthetic fracture or evidence of loosening. 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 Prepatellar soft tissue swelling. Soft tissue laceration or small locule of superficial soft tissue gas about   the anterior knee. Probable knee joint effusion.   CT Knee Left w/o Contrast     Status: None    Narrative    EXAM: CT KNEE LEFT W/O CONTRAST  LOCATION: [facility]  DATE: 3/27/2025    INDICATION: left kjnee injury eval for knee arthrotomy  COMPARISON: Multiple knee x-rays, most recently March 26, 2025 and more remotely December 18, 2024.  TECHNIQUE: Noncontrast. Axial, sagittal and coronal thin-section reconstruction. Dose reduction techniques were used.     FINDINGS:   Femoral and tibial knee arthroplasty hardware appears unchanged with no evidence of periprosthetic fracture.    There is a large joint effusion without lipohemarthrosis or intra-articular gas.    Subcutaneous edema superficial to the patellar tendon. A midline skin defect over the anterior knee may represent laceration or dehiscence  of the surgical wound. No deep soft tissue gas identified. The extensor mechanism appears intact.      Impression    IMPRESSION:  1.  Large left knee effusion with a midline anterior skin defect which may represent laceration or dehiscence of the surgical wound. No intra-articular gas or evidence of fracture.       XR Hand Left G/E 3 Views     Status: None    Narrative    EXAM: XR HAND LEFT G/E 3 VIEWS  LOCATION: [facility]  DATE: 3/27/2025    INDICATION: pain and swelling after a fall. rule out fracture  COMPARISON: None.      Impression    IMPRESSION: No visible fracture or dislocation. Mild degenerative change.   Basic metabolic panel     Status: Abnormal   Result Value Ref Range    Sodium 126 (L) 135 - 145 mmol/L    Potassium 4.2 3.4 - 5.3 mmol/L    Chloride 92 (L) 98 - 107 mmol/L    Carbon Dioxide (CO2) 25 22 - 29 mmol/L    Anion Gap 9 7 - 15 mmol/L    Urea Nitrogen 7.2 (L) 8.0 - 23.0 mg/dL    Creatinine 0.78 0.51 - 0.95 mg/dL    GFR Estimate 82 >60 mL/min/1.73m2    Calcium 9.2 8.8 - 10.4 mg/dL    Glucose 145 (H) 70 - 99 mg/dL   Glucose by meter     Status: Abnormal   Result Value Ref Range    GLUCOSE BY METER POCT 102 (H) 70 - 99 mg/dL   CBC with platelets and differential     Status: Abnormal   Result Value Ref Range    WBC Count 7.4 4.0 - 11.0 10e3/uL    RBC Count 3.84 3.80 - 5.20 10e6/uL    Hemoglobin 10.4 (L) 11.7 - 15.7 g/dL    Hematocrit 31.1 (L) 35.0 - 47.0 %    MCV 81 78 - 100 fL    MCH 27.1 26.5 - 33.0 pg    MCHC 33.4 31.5 - 36.5 g/dL    RDW 14.5 10.0 - 15.0 %    Platelet Count 385 150 - 450 10e3/uL    % Neutrophils 56 %    % Lymphocytes 35 %    % Monocytes 6 %    % Eosinophils 2 %    % Basophils 0 %    % Immature Granulocytes 0 %    NRBCs per 100 WBC 0 <1 /100    Absolute Neutrophils 4.2 1.6 - 8.3 10e3/uL    Absolute Lymphocytes 2.6 0.8 - 5.3 10e3/uL    Absolute Monocytes 0.5 0.0 - 1.3 10e3/uL    Absolute Eosinophils 0.1 0.0 - 0.7 10e3/uL    Absolute Basophils 0.0 0.0 - 0.2  10e3/uL    Absolute Immature Granulocytes 0.0 <=0.4 10e3/uL    Absolute NRBCs 0.0 10e3/uL   Basic metabolic panel     Status: Abnormal   Result Value Ref Range    Sodium 130 (L) 135 - 145 mmol/L    Potassium 4.1 3.4 - 5.3 mmol/L    Chloride 96 (L) 98 - 107 mmol/L    Carbon Dioxide (CO2) 23 22 - 29 mmol/L    Anion Gap 11 7 - 15 mmol/L    Urea Nitrogen 8.8 8.0 - 23.0 mg/dL    Creatinine 0.68 0.51 - 0.95 mg/dL    GFR Estimate >90 >60 mL/min/1.73m2    Calcium 9.2 8.8 - 10.4 mg/dL    Glucose 218 (H) 70 - 99 mg/dL   CBC with platelets     Status: Abnormal   Result Value Ref Range    WBC Count 8.1 4.0 - 11.0 10e3/uL    RBC Count 3.77 (L) 3.80 - 5.20 10e6/uL    Hemoglobin 10.1 (L) 11.7 - 15.7 g/dL    Hematocrit 30.6 (L) 35.0 - 47.0 %    MCV 81 78 - 100 fL    MCH 26.8 26.5 - 33.0 pg    MCHC 33.0 31.5 - 36.5 g/dL    RDW 14.6 10.0 - 15.0 %    Platelet Count 367 150 - 450 10e3/uL   Hemoglobin A1c     Status: Abnormal   Result Value Ref Range    Estimated Average Glucose 189 (H) <117 mg/dL    Hemoglobin A1C 8.2 (H) <5.7 %   Glucose by meter     Status: Abnormal   Result Value Ref Range    GLUCOSE BY METER POCT 157 (H) 70 - 99 mg/dL   Glucose by meter     Status: Abnormal   Result Value Ref Range    GLUCOSE BY METER POCT 201 (H) 70 - 99 mg/dL   Glucose by meter     Status: Abnormal   Result Value Ref Range    GLUCOSE BY METER POCT 163 (H) 70 - 99 mg/dL   Glucose by meter     Status: Abnormal   Result Value Ref Range    GLUCOSE BY METER POCT 196 (H) 70 - 99 mg/dL   Glucose by meter     Status: Abnormal   Result Value Ref Range    GLUCOSE BY METER POCT 144 (H) 70 - 99 mg/dL   Basic metabolic panel     Status: Abnormal   Result Value Ref Range    Sodium 130 (L) 135 - 145 mmol/L    Potassium 4.1 3.4 - 5.3 mmol/L    Chloride 96 (L) 98 - 107 mmol/L    Carbon Dioxide (CO2) 24 22 - 29 mmol/L    Anion Gap 10 7 - 15 mmol/L    Urea Nitrogen 13.9 8.0 - 23.0 mg/dL    Creatinine 0.81 0.51 - 0.95 mg/dL    GFR Estimate 78 >60 mL/min/1.73m2     Calcium 9.0 8.8 - 10.4 mg/dL    Glucose 251 (H) 70 - 99 mg/dL   EKG 12 lead     Status: None   Result Value Ref Range    Systolic Blood Pressure  mmHg    Diastolic Blood Pressure  mmHg    Ventricular Rate 83 BPM    Atrial Rate 83 BPM    NH Interval 146 ms    QRS Duration 80 ms     ms    QTc 437 ms    P Axis 60 degrees    R AXIS 58 degrees    T Axis 82 degrees    Interpretation ECG       Sinus rhythm  Cannot rule out Inferior infarct , age undetermined  Abnormal ECG  No previous ECGs available  Confirmed by GENERATED REPORT, COMPUTER ([id number]),  Carmine Us ([id number]) on 3/26/2025 9:55:49 PM     CBC with platelets differential     Status: Abnormal    Narrative    The following orders were created for panel order CBC with platelets differential.  Procedure                               Abnormality         Status                     ---------                               -----------         ------                     CBC with platelets and ...[[id number]]  Abnormal            Final result                 Please view results for these tests on the individual orders.           Elaine Harden PA-C  [facility] Orthopedics Trauma Team   [facility]  Non-operative provider (Floor [facility]/ Trauma Rounds)

## 2025-03-28 NOTE — PROVIDER NOTIFICATION
Paged Dr. Rao, patients BG was 251 and she had 189 carbs for breakfast would you like to change her diet to mod carb?     Mod carb diet ordered.

## 2025-03-28 NOTE — CONSULTS
Care Management Initial Consult    General Information  Assessment completed with: Patient, Children,    Type of CM/SW Visit: Other    Primary Care Provider verified and updated as needed: Yes   Readmission within the last 30 days:        Reason for Consult: discharge planning  Advance Care Planning: Advance Care Planning Reviewed: no concerns identified          Communication Assessment  Patient's communication style: spoken language (English or Bilingual)    Hearing Difficulty or Deaf: no        Cognitive  Cognitive/Neuro/Behavioral: WDL                      Living Environment:   People in home: child(ervin), adult     Current living Arrangements: house      Able to return to prior arrangements: yes       Family/Social Support:  Care provided by: child(ervin)  Provides care for:       Support system: Children          Description of Support System: Supportive         Current Resources:   Patient receiving home care services: No        Community Resources: PCA  Equipment currently used at home: tub bench, walker, rolling, raised toilet seat, cane, straight  Supplies currently used at home: Other (ostomy supplies)    Employment/Financial:  Employment Status: retired        Financial Concerns: none           Does the patient's insurance plan have a 3 day qualifying hospital stay waiver?  Yes     Which insurance plan 3 day waiver is available? Alternative insurance waiver    Will the waiver be used for post-acute placement? Undetermined at this time    Lifestyle & Psychosocial Needs:  Social Drivers of Health     Food Insecurity: Low Risk  (3/28/2025)    Food Insecurity     Within the past 12 months, did you worry that your food would run out before you got money to buy more?: No     Within the past 12 months, did the food you bought just not last and you didn t have money to get more?: No   Depression: Not at risk (3/25/2025)    PHQ-2     PHQ-2 Score: 2   Housing Stability: Low Risk  (3/28/2025)    Housing Stability      Do you have housing? : Yes     Are you worried about losing your housing?: No   Tobacco Use: Medium Risk (3/26/2025)    Patient History     Smoking Tobacco Use: Former     Smokeless Tobacco Use: Never     Passive Exposure: Never   Financial Resource Strain: Low Risk  (3/28/2025)    Financial Resource Strain     Within the past 12 months, have you or your family members you live with been unable to get utilities (heat, electricity) when it was really needed?: No   Alcohol Use: Not At Risk (1/24/2024)    AUDIT-C     Frequency of Alcohol Consumption: Never     Average Number of Drinks: Patient does not drink     Frequency of Binge Drinking: Never   Transportation Needs: Low Risk  (3/28/2025)    Transportation Needs     Within the past 12 months, has lack of transportation kept you from medical appointments, getting your medicines, non-medical meetings or appointments, work, or from getting things that you need?: No   Physical Activity: Inactive (1/24/2024)    Exercise Vital Sign     Days of Exercise per Week: 0 days     Minutes of Exercise per Session: 0 min   Interpersonal Safety: Low Risk  (12/18/2024)    Interpersonal Safety     Do you feel physically and emotionally safe where you currently live?: Yes     Within the past 12 months, have you been hit, slapped, kicked or otherwise physically hurt by someone?: No     Within the past 12 months, have you been humiliated or emotionally abused in other ways by your partner or ex-partner?: No   Stress: Stress Concern Present (1/24/2024)    [employer] - Occupational Stress Questionnaire     Feeling of Stress : To some extent   Social Connections: Unknown (1/24/2024)    Social Connection and Isolation Panel [NHANES]     Frequency of Communication with Friends and Family: More than three times a week     Frequency of Social Gatherings with Friends and Family: More than three times a week     Attends Jain Services: Patient declined     Active  Member of Clubs or Organizations: Patient declined     Attends Club or Organization Meetings: Patient declined     Marital Status: Patient declined   Health Literacy: Not on file       Functional Status:  Prior to admission patient needed assistance:   Dependent ADLs:: Ambulation-cane, Bathing, Dressing, Transfers  Dependent IADLs:: Cleaning, Cooking, Laundry, Shopping, Meal Preparation, Medication Management, Transportation       Mental Health Status:  Mental Health Status: Current Concern  Mental Health Management: Psychiatrist, Medication    Chemical Dependency Status:  Chemical Dependency Status: Past Concern  Chemical Dependency Management: Other (see comment) (with past surgery had to watch the narcotic use)          Values/Beliefs:  Spiritual, Cultural Beliefs, Jehovah's witness Practices, Values that affect care: yes  Description of Beliefs that Will Affect Care: Oriental orthodox            Discussed  Partnership in Safe Discharge Planning  document with patient/family: No    Additional Information:  Met with patient to introduce self and discuss role of discharge planning.  Confirmed information above assessment, please see each section of above for details.  Patient lives in  a house with her daughter  Nini who is also her PCA . There are 2 stairs steps to enter and 7 steps within the home; normally dependent with ADLs/IADLs.      Pt PCP is as listed:    Tiffany King [phone] [address], [facility] [address]    Pt currently has PCA services.  She also has a CADI waiver and her  is [facility] [phone].     Pt anticipates may need help with wound care prior to discharge.      Next Steps:     Patient may discharge today.  Await discharge orders, see if patient would benefit for Skilled RN visit for home.      Shellie Whiting, Care Management RN, [facility]   Contact: via Renata

## 2025-03-28 NOTE — CONSULTS
"[facility] Nurse Inpatient Assessment     Consulted for: Ostomy Colostomy     Summary: patient with established colostomy, no concerns at this time, woc signing off 3/28    [facility] nurse follow-up plan: signing off    Patient History (according to provider note(s):      \"70 year old female with a past medical history of type 2 diabetes, chronic pain syndrome, schizophrenia, hyponatremia presents to hospital after mechanical fall. The patient was walking with her cane at a motel when she tripped and fell to the ground landing on her left side. She hit her knee and her hand. She denies any head trauma or loss of consciousness. She had no prodromal symptoms prior to her fall. After the fall she felt lightheaded and weak when trying to get back up, but reports that her symptoms have since resolved.   She denies any recent medication changes. She denies any recent fevers, chills, chest pain, palpitations, shortness of breath, nausea, vomiting, diarrhea, dysuria. \"    Assessment:      Areas visualized during today's visit: Focused: and Abdomen    Assessment of Established end Colostomy:  How long has patient had ostomy: more than 1 year   Stoma: viable, healthy, and normal-appearing  Mucutaneous junction: intact  Peristomal skin: none   Output: within normal limits    Is patient independent with ostomy care?: Yes and Independent  Home pouching system: coloplast deep convex cut to 33mm   Pouching issues and/or educational needs:none   Interventions completed today: Stoma assessment and Discharge instructions  Pouching system in use while hospitalized:  Coloplast one piece, cut to fit, and convex  Supplies location: discussed with patient , brought supply from home        Treatment Plan:     03/28/25 1041  Colostomy; LLQ Care  EFFECTIVE NOW        Comments: Staff RN or patient to change ostomy pouch, routine pouch change performed 3/28 by [facility]  ~ Encourage patient participation with ostomy care,  ~ " Empty pouch when 1/3 to 1/2 full,  ~ Notify WOC for ongoing ostomy pouch leakage by consult - woc signing off 3/28,  ~ Document stoma output volume, color, consistency EVERY shift  ~ Supplies used : home supplies, deep convex Coloplast cut to 33mm   Question Answer Comment   Type of Ostomy Colostomy    Location LLQ    Pouch Change Frequency PRN Leakage    Pouch Change Frequency Friday    Pouch Change Frequency Tuesday    Pouch changed by Staff RN    Pouch emptied by Nursing staff to empty    Straight Drainage No              Orders: Written    RECOMMEND PRIMARY TEAM ORDER: None, at this time  Education provided: plan of care  Discussed plan of care with: Patient, Family, and Nurse  Notify WOC if wound(s) deteriorate.  Nursing to notify the Provider(s) and re-consult the WOC Nurse if new skin concern.    DATA:     Current support surface: Standard  Standard gel mattress (Isoflex)  Containment of urine/stool: Incontinence Protocol, Incontinent pad in bed, and Colostomy pouch  BMI: There is no height or weight on file to calculate BMI.   Active diet order: Orders Placed This Encounter      Regular Diet Adult     Output: I/O last 3 completed shifts:  In: 520 [P.O.:520]  Out: 275 [Stool:275]     Labs:   Recent Labs   Lab 03/27/25  1159 03/26/25  2328   HGB 10.1* 10.4*   WBC 8.1 7.4   A1C  --  8.2*     Pressure injury risk assessment:   Sensory Perception: 3-->slightly limited  Moisture: 3-->occasionally moist  Activity: 3-->walks occasionally  Mobility: 3-->slightly limited  Nutrition: 3-->adequate  Friction and Shear: 3-->no apparent problem  Fernando Score: 18    [place] CWOCN   1st choice: Securely message with [employer] ([facility] [employer])   (2nd option: [facility] Office Phone [phone], messages checked periodically Mon-Fri 8a-4p)

## 2025-03-28 NOTE — PLAN OF CARE
Summary:      Patient is alert and oriented X4, VSS on RA with good sat. IV access removed . AVS  reviewed with patient and daughter , all questions were  answered  and patient verbalized understanding . Patient advised to follow up with PCP . Education provided on fluids restrictions, all questions were answered.   Patient advised to call the clinics  for any concerns or questions and also educated on fall and safety precautions . Patient belongings return.  Home care  set up by care coordinator.

## 2025-03-29 ENCOUNTER — MEDICAL CORRESPONDENCE (OUTPATIENT)
Dept: HEALTH INFORMATION MANAGEMENT | Facility: CLINIC | Age: 70
End: 2025-03-29
Payer: COMMERCIAL

## 2025-03-30 ENCOUNTER — HEALTH MAINTENANCE LETTER (OUTPATIENT)
Age: 70
End: 2025-03-30

## 2025-03-31 ENCOUNTER — TELEPHONE (OUTPATIENT)
Dept: FAMILY MEDICINE | Facility: CLINIC | Age: 70
End: 2025-03-31
Payer: COMMERCIAL

## 2025-03-31 NOTE — TELEPHONE ENCOUNTER
Home Care is calling regarding an established patient with [facility].  Requesting orders from: Tiffany King RN APPROVED: RN able to provide verbal orders.  Home Care will send orders for signature.  RN will close encounter.  Is this a request for a temporary pause in the home care episode?  No    Orders Requested    Skilled Nursing  Request for initial certification (first set of orders)   Frequency: 1x/week for 10 weeks - Watching suture lac on left knee.   RN gave verbal order: Yes      Physical Therapy  Request for initial evaluation and treatment (one time)   RN gave verbal order: Yes      Occupational Therapy  Request for initial evaluation and treatment (one time)   RN gave verbal order: Yes      Phone number Home Care can be reached at: [phone]  Okay to leave a detailed message?: Yes    Contacts       Contact Date/Time Type Contact Phone/Fax    03/31/2025 11:08 AM CDT Phone (Incoming) EVELYN Palomares (Home Care) [phone]     [facility]  Secure           Brook Alfonso RN

## 2025-04-07 ENCOUNTER — OFFICE VISIT (OUTPATIENT)
Dept: ORTHOPEDICS | Facility: CLINIC | Age: 70
End: 2025-04-07
Payer: COMMERCIAL

## 2025-04-07 DIAGNOSIS — M54.16 LUMBAR RADICULOPATHY: Primary | ICD-10-CM

## 2025-04-07 PROCEDURE — 99214 OFFICE O/P EST MOD 30 MIN: CPT | Performed by: FAMILY MEDICINE

## 2025-04-07 PROCEDURE — 1126F AMNT PAIN NOTED NONE PRSNT: CPT | Performed by: FAMILY MEDICINE

## 2025-04-07 RX ORDER — CYCLOBENZAPRINE HCL 10 MG
10 TABLET ORAL
Qty: 30 TABLET | Refills: 0 | Status: SHIPPED | OUTPATIENT
Start: 2025-04-07

## 2025-04-07 ASSESSMENT — PAIN SCALES - GENERAL: PAINLEVEL_OUTOF10: NO PAIN (0)

## 2025-04-07 NOTE — PROGRESS NOTES
HISTORY OF PRESENT ILLNESS  Ms. Muir is a pleasant 70 year old female following up with back pain.  Holly has been experiencing back pain for the past couple of months.  She points to the right side of her low back, pain is focal but will radiate down her leg, past her hip at times.  She has had very similar pain in the past and has found relief with a corticosteroid injection.     PHYSICAL EXAM  General  - normal appearance, in no obvious distress    Musculoskeletal - lumbar spine  - stance: slow to rise and sit  - inspection: normal bone and joint alignment, no obvious scoliosis  - palpation: bilateral lumbar paravertebral spasm  - ROM: pain with bilateral rotation, flexion past 30 deg, extension  - strength: lower extremities 5/5 in all planes  - special tests:  (-) slump test bilaterally  Neuro  - patellar and Achilles DTRs 2+ bilaterally, no sensory or motor deficit, grossly normal coordination, normal muscle tone      ASSESSMENT & PLAN  Ms. Muir is a 70 year old female following up with back pain with radicular features.    I reviewed her previous CT and x-ray in the room with her, detailing her arthritic change and stenosis at L4-5.    Given her success with injections in the past agree that this would be reasonable to repeat, I referred her to have this done at her earliest convenience.    I am also prescribing her Flexeril for nighttime symptoms.    It was a pleasure seeing Holly.        Santy Harrington DO, CAM      This note was constructed using Dragon dictation software, please excuse any minor errors in spelling, grammar, or syntax.

## 2025-04-07 NOTE — NURSING NOTE
Chief Complaint   Patient presents with    Lower Back - Pain, Follow Up     Right side low pain       There were no vitals filed for this visit.    There is no height or weight on file to calculate BMI.      LYNDSAY Maya NREMT

## 2025-04-07 NOTE — LETTER
4/7/2025      Holly Muir  [id number] [address] [address]      Dear Colleague,    Thank you for referring your patient, Holly Muir, to the [facility] [place]. Please see a copy of my visit note below.    HISTORY OF PRESENT ILLNESS  Ms. Muir is a pleasant 70 year old female following up with back pain.  Holly has been experiencing back pain for the past couple of months.  She points to the right side of her low back, pain is focal but will radiate down her leg, past her hip at times.  She has had very similar pain in the past and has found relief with a corticosteroid injection.     PHYSICAL EXAM  General  - normal appearance, in no obvious distress    Musculoskeletal - lumbar spine  - stance: slow to rise and sit  - inspection: normal bone and joint alignment, no obvious scoliosis  - palpation: bilateral lumbar paravertebral spasm  - ROM: pain with bilateral rotation, flexion past 30 deg, extension  - strength: lower extremities 5/5 in all planes  - special tests:  (-) slump test bilaterally  Neuro  - patellar and Achilles DTRs 2+ bilaterally, no sensory or motor deficit, grossly normal coordination, normal muscle tone      ASSESSMENT & PLAN  Ms. Muir is a 70 year old female following up with back pain with radicular features.    I reviewed her previous CT and x-ray in the room with her, detailing her arthritic change and stenosis at L4-5.    Given her success with injections in the past agree that this would be reasonable to repeat, I referred her to have this done at her earliest convenience.    I am also prescribing her Flexeril for nighttime symptoms.    It was a pleasure seeing Holly.        Santy Harrington, , CAQSM      This note was constructed using Dragon dictation software, please excuse any minor errors in spelling, grammar, or syntax.      Again, thank you for allowing me to participate in the care of your patient.         Sincerely,        Santy Harrington, DO    Electronically signed

## 2025-04-08 ENCOUNTER — OFFICE VISIT (OUTPATIENT)
Dept: ORTHOPEDICS | Facility: CLINIC | Age: 70
End: 2025-04-08
Attending: ORTHOPAEDIC SURGERY
Payer: COMMERCIAL

## 2025-04-08 DIAGNOSIS — S81.012D: Primary | ICD-10-CM

## 2025-04-08 PROCEDURE — 99213 OFFICE O/P EST LOW 20 MIN: CPT | Performed by: PHYSICIAN ASSISTANT

## 2025-04-08 NOTE — PROGRESS NOTES
Chief Complaint: RECHECK (s/p left total knee arthroplasty DOS: 12/18/24 - she is still having pain. )         HPI: Holly Muir returns today in follow-up for recent laceration of left knee. She is in recovery from total knee arthroplasty in December. Unfortunately, had a fall on 3/26 when her cane getting caught on a rug when she was staying at a hotel. She attempted to avoid falling on the knee, but landed turned. She was able to immediately get up and walk, but upon return to her hotel room, she felt bleeding under her clothing and was found to have an open cut involving her knee incision and additionally a transverse tear.     She was brought to [facility] for evaluation. In the ED, she had x-rays, CT scan which were negative for arthrotomy or air within in the joint and hardware was stable without periprosthetic fracture. 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  The area was cleansed and closed with one layer of stitches. She was seen by Orthopedics who were consulted and felt no surgical irrigation/debridement was indicated. She was admitted for prophylactic antibiotics and observation as well as hyponatremia. She was discharged on 3/28 and told to followup with surgical team for monitoring. She has been in a knee immobilzer to protect the wound. She also had home care ordered for wound cares and is wondering if this can be changed to location of daughter's residence where Holly is planning to go stay while this heals.     She returns for suture removal. She denies any drainage or redness about the laceration.     Current Status:  KOOS Jr: 61.48  [facility]: 3  Global Mental Health Score -    Global Physical Health Score -    PROMIS TOTAL - SUBSCORES -      Medications and allergies are documented in the EMR and have been reviewed.      Current Outpatient Medications:     acetaminophen (TYLENOL) 325 MG tablet, Take 2 tablets (650 mg) by mouth every 4 hours as needed for other  (mild pain)., Disp: 100 tablet, Rfl: 0    ASPIRIN LOW DOSE 81 MG chewable tablet, TAKE 1 TABLET BY MOUTH EVERY DAY (Patient taking differently: Take 162 mg by mouth daily.), Disp: 90 tablet, Rfl: 0    buPROPion (WELLBUTRIN SR) 200 MG 12 hr tablet, Take 1 tablet (200 mg) by mouth daily (with breakfast)., Disp: 30 tablet, Rfl: 1    busPIRone HCl (BUSPAR) 30 MG tablet, Take 1 tablet (30 mg) by mouth 2 times daily. TAKE 1 TABLET BY MOUTH TWICE A DAY, Disp: 60 tablet, Rfl: 1    carboxymethylcellulose (REFRESH PLUS) 0.5 % SOLN ophthalmic solution, PLACE 1 DROP INTO BOTH EYES 2 TIMES DAILY AS NEEDED FOR DRY EYES., Disp: 30 mL, Rfl: 11    cariprazine (VRAYLAR) 6 MG capsule, Take 1 capsule (6 mg) by mouth daily., Disp: 30 capsule, Rfl: 1    cephALEXin (KEFLEX) 500 MG capsule, Take 1 capsule (500 mg) by mouth 4 times daily., Disp: 28 capsule, Rfl: 0    cetirizine (ZYRTEC) 10 MG tablet, TAKE 1 TABLET (10 MG) BY MOUTH DAILY., Disp: 90 tablet, Rfl: 1    cyclobenzaprine (FLEXERIL) 10 MG tablet, Take 1 tablet (10 mg) by mouth nightly as needed for muscle spasms., Disp: 30 tablet, Rfl: 0    ferrous gluconate (FERGON) 324 (38 Fe) MG tablet, Take 1 tablet (324 mg) by mouth daily (with breakfast)., Disp: 90 tablet, Rfl: 0    gabapentin (NEURONTIN) 600 MG tablet, TAKE 2 TABLETS (1,200 MG) BY MOUTH 3 TIMES DAILY FOR 30 DAYS, Disp: 180 tablet, Rfl: 5    insulin glargine (LANTUS SOLOSTAR) 100 UNIT/ML pen, INJECT 25 UNITS AT BEDTIME AS DIRECTED + 2 UNITS FOR PRIMING OF PEN., Disp: 30 mL, Rfl: 3    insulin pen needle (31G X 5 MM) 31G X 5 MM miscellaneous, Use four times 4 day times a day, Disp: 200 each, Rfl: 3    ipratropium (ATROVENT) 0.03 % nasal spray, Spray 2 sprays into both nostrils every 12 hours., Disp: 30 mL, Rfl: 3    metFORMIN (GLUCOPHAGE XR) 500 MG 24 hr tablet, Take 2 tablets (1,000 mg) by mouth 2 times daily (with meals). Take two tablets with breakfast and one tablet with dinner for one week then increase to two tablets with  breakfast and two tablets with dinner thereafter, Disp: 360 tablet, Rfl: 3    mirtazapine (REMERON) 7.5 MG tablet, Take 1 tablet (7.5 mg) by mouth at bedtime., Disp: 30 tablet, Rfl: 1    MOUNJARO 15 MG/0.5ML SOAJ, Inject 0.5 mLs (15 mg) subcutaneously every 7 days., Disp: 2 mL, Rfl: 3    Multiple Vitamin (DAILY-ISAAC MULTIVITAMIN) TABS, TAKE 1 TABLET BY MOUTH EVERY DAY, Disp: 90 tablet, Rfl: 0    nystatin (MYCOSTATIN) 930953 UNIT/GM external powder, Apply 1 dose topically 3 times daily as needed, Disp: 60 g, Rfl: 3    omeprazole (PRILOSEC) 40 MG DR capsule, Take 1 capsule (40 mg) by mouth daily., Disp: 90 capsule, Rfl: 0    ONETOUCH ULTRA test strip, USE TO TEST BLOOD SUGAR 4 TIMES DAILY OR AS DIRECTED. ACCU CHECK GUIDE STRIPS, Disp: 400 strip, Rfl: 3    oxyCODONE (ROXICODONE) 5 MG tablet, Take 1 tablet (5 mg) by mouth every 6 hours as needed for severe pain., Disp: 15 tablet, Rfl: 0    PARoxetine (PAXIL) 40 MG tablet, Take 1 tablet (40 mg) by mouth every evening., Disp: 30 tablet, Rfl: 1    pioglitazone (ACTOS) 45 MG tablet, TAKE 1 TABLET BY MOUTH EVERY DAY, Disp: 90 tablet, Rfl: 1    propranolol ER (INDERAL LA) 80 MG 24 hr capsule, TAKE 1 CAPSULE BY MOUTH EVERY DAY, Disp: 90 capsule, Rfl: 2    ramipril (ALTACE) 5 MG capsule, Take 1 capsule (5 mg) by mouth daily., Disp: 90 capsule, Rfl: 1    rOPINIRole (REQUIP) 0.25 MG tablet, TAKE 1 TABLET (0.25 MG) BY MOUTH EVERY EVENING, Disp: 90 tablet, Rfl: 0    simvastatin (ZOCOR) 20 MG tablet, TAKE 1 TABLET BY MOUTH EVERYDAY AT BEDTIME, Disp: 90 tablet, Rfl: 0    traZODone (DESYREL) 50 MG tablet, Take 1 tablet (50 mg) by mouth nightly as needed for sleep., Disp: 30 tablet, Rfl: 1    vitamin C (CVS VITAMIN C) 500 MG tablet, 1/2 TABLET BY MOUTH JACOBSON WITH IRON SUPPLEMENT BEFORE MEAL, Disp: 45 tablet, Rfl: 0    VITAMIN D3 25 MCG (1000 UT) tablet, TAKE 1 TABLET BY MOUTH EVERY DAY, Disp: 90 tablet, Rfl: 3    Allergies: Morphine sulfate           Results for orders placed or  performed during the hospital encounter of 03/26/25   XR Knee Left 1/2 Views     Status: None     Narrative     EXAM: XR KNEE LEFT 1/2 VIEWS  LOCATION: [facility]  DATE: 3/26/2025     INDICATION: fall knee injury  COMPARISON: 01/11/2025        Impression     IMPRESSION: Left TKA. No periprosthetic fracture or evidence of loosening. 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 Prepatellar soft tissue swelling. Soft tissue laceration or small locule of superficial soft tissue gas about   the anterior knee. Probable knee joint effusion.   CT Knee Left w/o Contrast     Status: None     Narrative     EXAM: CT KNEE LEFT W/O CONTRAST  LOCATION: [facility]  DATE: 3/27/2025     INDICATION: left kjnee injury eval for knee arthrotomy  COMPARISON: Multiple knee x-rays, most recently March 26, 2025 and more remotely December 18, 2024.  TECHNIQUE: Noncontrast. Axial, sagittal and coronal thin-section reconstruction. Dose reduction techniques were used.      FINDINGS:   Femoral and tibial knee arthroplasty hardware appears unchanged with no evidence of periprosthetic fracture.     There is a large joint effusion without lipohemarthrosis or intra-articular gas.     Subcutaneous edema superficial to the patellar tendon. A midline skin defect over the anterior knee may represent laceration or dehiscence of the surgical wound. No deep soft tissue gas identified. The extensor mechanism appears intact.        Impression     IMPRESSION:  1.  Large left knee effusion with a midline anterior skin defect which may represent laceration or dehiscence of the surgical wound. No intra-articular gas or evidence of fracture.       Physical Exam:  On physical examination the patient appears the stated age, is in no acute distress, affect is appropriate, and breathing is non-labored.    There were no vitals taken for this visit.  There is no height or weight on file to  calculate BMI.    Ambulating independently with walker and KI in place  Laceration over central to distal third of incision line with transverse extension.   Tenderness to palpation: mild, around anterior knee/laceration  Calf soft and nontender  Able to actively straight leg raise with good strength and no extensor lag  Distally, the circulatory, motor, and sensation exam is intact with 5/5 EHL, gastroc-soleus, and tibialis anterior.  Sensation to light touch is intact.  Dorsalis pedis and posterior tibialis pulses are palpable.  There are no sores on the feet, no bruising, and no lymphedema.        Assessment:   Acute laceration from blunt trauma of ground level fall on 3/26/25, healing without evidence of dehiscence or infection today.     Plan:   -Every other suture removed today. Skin is quite delicate and will plan to leave several stitches in place for one additional week.   -Recommended continued use of KI for additional week with ambulation and to prevent motion of knee at rest. She may open the brace at rest and do some gentle ROM 0-40 degrees or so but discussed importance of avoiding tension at the laceration.   -Complete course of Keflex, then finish.   -She was directed to keep this dry in the shower and to implement daily dressing changes with single layer of xeroform, guaze or light padded dressing. \  -Reviewed signs of infection including redness, increasing pain, purulent drainage or maloder and need for urgent evaluation if this is present.     She demonstrated understanding. Plan was reviewed with Dr. Sheehan.       Shayy Scott PA-C    [facility]  Orthopaedic Surgery      Referred Self

## 2025-04-08 NOTE — PATIENT INSTRUCTIONS
Thanks for coming today.  [facility]  [address] [place] [address]    To schedule future appointments in Ortho Clinic, you may call [phone].    To schedule ordered imaging or an injection ordered by your provider:  Call [facility] Injection scheduling line: [phone]    MyChart available online at:  [email or web address]    Please call if any further questions or concerns ([phone]).  Clinic hours 8 am to 5 pm.    Return to clinic (call) if symptoms worsen or fail to improve.

## 2025-04-08 NOTE — NURSING NOTE
Holly Muir is here for:     Chief Complaint   Patient presents with    RECHECK     s/p left total knee arthroplasty DOS: 12/18/24 - she is still having pain.        Pain: 4/10  Taking the following medications from surgery: Nothing, taking penicillin  Needs refill on: Nothing  Physical therapy clinic: In home care now.       Opal Mathew, ATC

## 2025-04-08 NOTE — LETTER
4/8/2025      Holly Muir  [address] [address] [facility] [address]      Dear Colleague,    Thank you for referring your patient, Holly Muir, to the [facility]. Please see a copy of my visit note below.    Chief Complaint: RECHECK (s/p left total knee arthroplasty DOS: 12/18/24 - she is still having pain. )         HPI: Holly Muir returns today in follow-up for recent laceration of left knee. She is in recovery from total knee arthroplasty in December. Unfortunately, had a fall on 3/26 when her cane getting caught on a rug when she was staying at a hotel. She attempted to avoid falling on the knee, but landed turned. She was able to immediately get up and walk, but upon return to her hotel room, she felt bleeding under her clothing and was found to have an open cut involving her knee incision and additionally a transverse tear.     She was brought to [facility] for evaluation. In the ED, she had x-rays, CT scan which were negative for arthrotomy or air within in the joint and hardware was stable without periprosthetic fracture. 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  The area was cleansed and closed with one layer of stitches. She was seen by Orthopedics who were consulted and felt no surgical irrigation/debridement was indicated. She was admitted for prophylactic antibiotics and observation as well as hyponatremia. She was discharged on 3/28 and told to followup with surgical team for monitoring. She has been in a knee immobilzer to protect the wound. She also had home care ordered for wound cares and is wondering if this can be changed to location of daughter's residence where Holly is planning to go stay while this heals.     She returns for suture removal. She denies any drainage or redness about the laceration.     Current Status:  KOOS Jr: 61.48  [facility]: 3  Global Mental Health Score -    Global Physical Health Score -    PROMIS  TOTAL - SUBSCORES -      Medications and allergies are documented in the EMR and have been reviewed.      Current Outpatient Medications:      acetaminophen (TYLENOL) 325 MG tablet, Take 2 tablets (650 mg) by mouth every 4 hours as needed for other (mild pain)., Disp: 100 tablet, Rfl: 0     ASPIRIN LOW DOSE 81 MG chewable tablet, TAKE 1 TABLET BY MOUTH EVERY DAY (Patient taking differently: Take 162 mg by mouth daily.), Disp: 90 tablet, Rfl: 0     buPROPion (WELLBUTRIN SR) 200 MG 12 hr tablet, Take 1 tablet (200 mg) by mouth daily (with breakfast)., Disp: 30 tablet, Rfl: 1     busPIRone HCl (BUSPAR) 30 MG tablet, Take 1 tablet (30 mg) by mouth 2 times daily. TAKE 1 TABLET BY MOUTH TWICE A DAY, Disp: 60 tablet, Rfl: 1     carboxymethylcellulose (REFRESH PLUS) 0.5 % SOLN ophthalmic solution, PLACE 1 DROP INTO BOTH EYES 2 TIMES DAILY AS NEEDED FOR DRY EYES., Disp: 30 mL, Rfl: 11     cariprazine (VRAYLAR) 6 MG capsule, Take 1 capsule (6 mg) by mouth daily., Disp: 30 capsule, Rfl: 1     cephALEXin (KEFLEX) 500 MG capsule, Take 1 capsule (500 mg) by mouth 4 times daily., Disp: 28 capsule, Rfl: 0     cetirizine (ZYRTEC) 10 MG tablet, TAKE 1 TABLET (10 MG) BY MOUTH DAILY., Disp: 90 tablet, Rfl: 1     cyclobenzaprine (FLEXERIL) 10 MG tablet, Take 1 tablet (10 mg) by mouth nightly as needed for muscle spasms., Disp: 30 tablet, Rfl: 0     ferrous gluconate (FERGON) 324 (38 Fe) MG tablet, Take 1 tablet (324 mg) by mouth daily (with breakfast)., Disp: 90 tablet, Rfl: 0     gabapentin (NEURONTIN) 600 MG tablet, TAKE 2 TABLETS (1,200 MG) BY MOUTH 3 TIMES DAILY FOR 30 DAYS, Disp: 180 tablet, Rfl: 5     insulin glargine (LANTUS SOLOSTAR) 100 UNIT/ML pen, INJECT 25 UNITS AT BEDTIME AS DIRECTED + 2 UNITS FOR PRIMING OF PEN., Disp: 30 mL, Rfl: 3     insulin pen needle (31G X 5 MM) 31G X 5 MM miscellaneous, Use four times 4 day times a day, Disp: 200 each, Rfl: 3     ipratropium (ATROVENT) 0.03 % nasal spray, Spray 2 sprays into both  nostrils every 12 hours., Disp: 30 mL, Rfl: 3     metFORMIN (GLUCOPHAGE XR) 500 MG 24 hr tablet, Take 2 tablets (1,000 mg) by mouth 2 times daily (with meals). Take two tablets with breakfast and one tablet with dinner for one week then increase to two tablets with breakfast and two tablets with dinner thereafter, Disp: 360 tablet, Rfl: 3     mirtazapine (REMERON) 7.5 MG tablet, Take 1 tablet (7.5 mg) by mouth at bedtime., Disp: 30 tablet, Rfl: 1     MOUNJARO 15 MG/0.5ML SOAJ, Inject 0.5 mLs (15 mg) subcutaneously every 7 days., Disp: 2 mL, Rfl: 3     Multiple Vitamin (DAILY-ISAAC MULTIVITAMIN) TABS, TAKE 1 TABLET BY MOUTH EVERY DAY, Disp: 90 tablet, Rfl: 0     nystatin (MYCOSTATIN) 830244 UNIT/GM external powder, Apply 1 dose topically 3 times daily as needed, Disp: 60 g, Rfl: 3     omeprazole (PRILOSEC) 40 MG DR capsule, Take 1 capsule (40 mg) by mouth daily., Disp: 90 capsule, Rfl: 0     ONETOUCH ULTRA test strip, USE TO TEST BLOOD SUGAR 4 TIMES DAILY OR AS DIRECTED. ACCU CHECK GUIDE STRIPS, Disp: 400 strip, Rfl: 3     oxyCODONE (ROXICODONE) 5 MG tablet, Take 1 tablet (5 mg) by mouth every 6 hours as needed for severe pain., Disp: 15 tablet, Rfl: 0     PARoxetine (PAXIL) 40 MG tablet, Take 1 tablet (40 mg) by mouth every evening., Disp: 30 tablet, Rfl: 1     pioglitazone (ACTOS) 45 MG tablet, TAKE 1 TABLET BY MOUTH EVERY DAY, Disp: 90 tablet, Rfl: 1     propranolol ER (INDERAL LA) 80 MG 24 hr capsule, TAKE 1 CAPSULE BY MOUTH EVERY DAY, Disp: 90 capsule, Rfl: 2     ramipril (ALTACE) 5 MG capsule, Take 1 capsule (5 mg) by mouth daily., Disp: 90 capsule, Rfl: 1     rOPINIRole (REQUIP) 0.25 MG tablet, TAKE 1 TABLET (0.25 MG) BY MOUTH EVERY EVENING, Disp: 90 tablet, Rfl: 0     simvastatin (ZOCOR) 20 MG tablet, TAKE 1 TABLET BY MOUTH EVERYDAY AT BEDTIME, Disp: 90 tablet, Rfl: 0     traZODone (DESYREL) 50 MG tablet, Take 1 tablet (50 mg) by mouth nightly as needed for sleep., Disp: 30 tablet, Rfl: 1     vitamin C (CVS  VITAMIN C) 500 MG tablet, 1/2 TABLET BY MOUTH JACOBSON WITH IRON SUPPLEMENT BEFORE MEAL, Disp: 45 tablet, Rfl: 0     VITAMIN D3 25 MCG (1000 UT) tablet, TAKE 1 TABLET BY MOUTH EVERY DAY, Disp: 90 tablet, Rfl: 3    Allergies: Morphine sulfate           Results for orders placed or performed during the hospital encounter of 03/26/25   XR Knee Left 1/2 Views     Status: None     Narrative     EXAM: XR KNEE LEFT 1/2 VIEWS  LOCATION: [facility]  DATE: 3/26/2025     INDICATION: fall knee injury  COMPARISON: 01/11/2025        Impression     IMPRESSION: Left TKA. No periprosthetic fracture or evidence of loosening. 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 Prepatellar soft tissue swelling. Soft tissue laceration or small locule of superficial soft tissue gas about   the anterior knee. Probable knee joint effusion.   CT Knee Left w/o Contrast     Status: None     Narrative     EXAM: CT KNEE LEFT W/O CONTRAST  LOCATION: [facility]  DATE: 3/27/2025     INDICATION: left kjnee injury eval for knee arthrotomy  COMPARISON: Multiple knee x-rays, most recently March 26, 2025 and more remotely December 18, 2024.  TECHNIQUE: Noncontrast. Axial, sagittal and coronal thin-section reconstruction. Dose reduction techniques were used.      FINDINGS:   Femoral and tibial knee arthroplasty hardware appears unchanged with no evidence of periprosthetic fracture.     There is a large joint effusion without lipohemarthrosis or intra-articular gas.     Subcutaneous edema superficial to the patellar tendon. A midline skin defect over the anterior knee may represent laceration or dehiscence of the surgical wound. No deep soft tissue gas identified. The extensor mechanism appears intact.        Impression     IMPRESSION:  1.  Large left knee effusion with a midline anterior skin defect which may represent laceration or dehiscence of the surgical wound. No intra-articular gas  or evidence of fracture.       Physical Exam:  On physical examination the patient appears the stated age, is in no acute distress, affect is appropriate, and breathing is non-labored.    There were no vitals taken for this visit.  There is no height or weight on file to calculate BMI.    Ambulating independently with walker and KI in place  Laceration over central to distal third of incision line with transverse extension.   Tenderness to palpation: mild, around anterior knee/laceration  Calf soft and nontender  Able to actively straight leg raise with good strength and no extensor lag  Distally, the circulatory, motor, and sensation exam is intact with 5/5 EHL, gastroc-soleus, and tibialis anterior.  Sensation to light touch is intact.  Dorsalis pedis and posterior tibialis pulses are palpable.  There are no sores on the feet, no bruising, and no lymphedema.        Assessment:   Acute laceration from blunt trauma of ground level fall on 3/26/25, healing without evidence of dehiscence or infection today.     Plan:   -Every other suture removed today. Skin is quite delicate and will plan to leave several stitches in place for one additional week.   -Recommended continued use of KI for additional week with ambulation and to prevent motion of knee at rest. She may open the brace at rest and do some gentle ROM 0-40 degrees or so but discussed importance of avoiding tension at the laceration.   -Complete course of Keflex, then finish.   -She was directed to keep this dry in the shower and to implement daily dressing changes with single layer of xeroform, guaze or light padded dressing. \  -Reviewed signs of infection including redness, increasing pain, purulent drainage or maloder and need for urgent evaluation if this is present.     She demonstrated understanding. Plan was reviewed with Dr. Sheehan.       Shayy Scott PA-C    [facility]  Orthopaedic Surgery      Referred Self    Again, thank you for allowing  me to participate in the care of your patient.        Sincerely,        Shayy Scott PA-C    Electronically signed

## 2025-04-08 NOTE — LETTER
April 8, 2025      Holly Muir  [address] [facility] [address]        To Whom It May Concern:    Holly Muir was seen in our clinic. She may do knee range of motion 0-90 degrees. Weight bearing as tolerated. Upper body and core strengthening.     Wound care change daily with baljinder. Keep dry while showering.       Sincerely,      Shayy Scott      Electronically signed

## 2025-04-09 ENCOUNTER — MEDICAL CORRESPONDENCE (OUTPATIENT)
Dept: HEALTH INFORMATION MANAGEMENT | Facility: CLINIC | Age: 70
End: 2025-04-09

## 2025-04-15 ENCOUNTER — VIRTUAL VISIT (OUTPATIENT)
Dept: PSYCHIATRY | Facility: CLINIC | Age: 70
End: 2025-04-15
Payer: COMMERCIAL

## 2025-04-15 ENCOUNTER — OFFICE VISIT (OUTPATIENT)
Dept: ORTHOPEDICS | Facility: CLINIC | Age: 70
End: 2025-04-15
Payer: COMMERCIAL

## 2025-04-15 VITALS — BODY MASS INDEX: 31.64 KG/M2 | WEIGHT: 173 LBS

## 2025-04-15 DIAGNOSIS — G47.00 INSOMNIA, UNSPECIFIED TYPE: ICD-10-CM

## 2025-04-15 DIAGNOSIS — F41.1 GAD (GENERALIZED ANXIETY DISORDER): ICD-10-CM

## 2025-04-15 DIAGNOSIS — F25.1 SCHIZOAFFECTIVE DISORDER, DEPRESSIVE TYPE (H): ICD-10-CM

## 2025-04-15 DIAGNOSIS — Z96.651 STATUS POST TOTAL KNEE REPLACEMENT, RIGHT: Primary | ICD-10-CM

## 2025-04-15 PROCEDURE — 99212 OFFICE O/P EST SF 10 MIN: CPT | Performed by: PHYSICIAN ASSISTANT

## 2025-04-15 RX ORDER — BUPROPION HYDROCHLORIDE 200 MG/1
200 TABLET, EXTENDED RELEASE ORAL
Qty: 90 TABLET | Refills: 1 | Status: SHIPPED | OUTPATIENT
Start: 2025-04-15

## 2025-04-15 RX ORDER — MIRTAZAPINE 7.5 MG/1
7.5 TABLET, FILM COATED ORAL AT BEDTIME
Qty: 30 TABLET | Refills: 1 | Status: SHIPPED | OUTPATIENT
Start: 2025-04-15

## 2025-04-15 RX ORDER — BUSPIRONE HYDROCHLORIDE 30 MG/1
30 TABLET ORAL 2 TIMES DAILY
Qty: 180 TABLET | Refills: 1 | Status: SHIPPED | OUTPATIENT
Start: 2025-04-15

## 2025-04-15 RX ORDER — TRAZODONE HYDROCHLORIDE 50 MG/1
50 TABLET ORAL AT BEDTIME
Qty: 30 TABLET | Refills: 1 | Status: SHIPPED | OUTPATIENT
Start: 2025-04-15

## 2025-04-15 RX ORDER — PAROXETINE 40 MG/1
40 TABLET, FILM COATED ORAL EVERY EVENING
Qty: 90 TABLET | Refills: 1 | Status: SHIPPED | OUTPATIENT
Start: 2025-04-15

## 2025-04-15 ASSESSMENT — ANXIETY QUESTIONNAIRES
IF YOU CHECKED OFF ANY PROBLEMS ON THIS QUESTIONNAIRE, HOW DIFFICULT HAVE THESE PROBLEMS MADE IT FOR YOU TO DO YOUR WORK, TAKE CARE OF THINGS AT HOME, OR GET ALONG WITH OTHER PEOPLE: VERY DIFFICULT
4. TROUBLE RELAXING: NEARLY EVERY DAY
7. FEELING AFRAID AS IF SOMETHING AWFUL MIGHT HAPPEN: NEARLY EVERY DAY
2. NOT BEING ABLE TO STOP OR CONTROL WORRYING: NEARLY EVERY DAY
3. WORRYING TOO MUCH ABOUT DIFFERENT THINGS: NEARLY EVERY DAY
GAD7 TOTAL SCORE: 16
8. IF YOU CHECKED OFF ANY PROBLEMS, HOW DIFFICULT HAVE THESE MADE IT FOR YOU TO DO YOUR WORK, TAKE CARE OF THINGS AT HOME, OR GET ALONG WITH OTHER PEOPLE?: VERY DIFFICULT
GAD7 TOTAL SCORE: 16
5. BEING SO RESTLESS THAT IT IS HARD TO SIT STILL: NEARLY EVERY DAY
1. FEELING NERVOUS, ANXIOUS, OR ON EDGE: SEVERAL DAYS
7. FEELING AFRAID AS IF SOMETHING AWFUL MIGHT HAPPEN: NEARLY EVERY DAY
6. BECOMING EASILY ANNOYED OR IRRITABLE: NOT AT ALL
GAD7 TOTAL SCORE: 16

## 2025-04-15 ASSESSMENT — PATIENT HEALTH QUESTIONNAIRE - PHQ9
SUM OF ALL RESPONSES TO PHQ QUESTIONS 1-9: 1
10. IF YOU CHECKED OFF ANY PROBLEMS, HOW DIFFICULT HAVE THESE PROBLEMS MADE IT FOR YOU TO DO YOUR WORK, TAKE CARE OF THINGS AT HOME, OR GET ALONG WITH OTHER PEOPLE: NOT DIFFICULT AT ALL
SUM OF ALL RESPONSES TO PHQ QUESTIONS 1-9: 1

## 2025-04-15 ASSESSMENT — PAIN SCALES - GENERAL: PAINLEVEL_OUTOF10: NO PAIN (0)

## 2025-04-15 NOTE — PROGRESS NOTES
Virtual Visit Details  Type of service:  Video Visit     Originating Location (pt. Location): Home  Distant Location (provider location):  On-site  Platform used for Video Visit: [facility] [place] [address], [place], [address]  [place] [address]  [phone]     OUTPATIENT PSYCHIATRIC FOLLOW UP     4/15/2025    Provider: KASIE Neal CNP      Appointment Start Time: 10:02 AM  Appointment End Time: 10:23 AM     Name: Holly Muir   : 1955                    Preferred Name: Holly    Identification: Holly Muir is 70 year old White Not  or  female who presents with her daughter , who they agreed to have interview with for follow up visit.    Telemedicine Visit: The patient's condition can be safely assessed and treated via synchronous audio and visual telemedicine encounter.   Consent:  The patient/guardian has verbally consented to: the potential risks and benefits of telemedicine (video visit or phone) versus in person care; bill my insurance or make self-payment for services provided; and responsibility for payment of non-covered services.  As the provider I attest to compliance with applicable laws and regulations related to telemedicine.    Chief Complaint       Medication Management      History of Present Illness     Interim History:  Holly Muir is 70 year old female who presents for a follow up visit after their last appointment on 3/25/25. During that appointment, she reported significant sleep disturbances, struggling to sleep two to three nights per week, and felt tired the next day. She experienced anxiety with frequent worrying and racing thoughts, often triggered by specific events, and her hallucinations worsened slightly, including seeing a crawling black dot on her hand.    We choose to increase to Vraylar 6 mg, decrease to Wellbutrin 200 mg daily. We continued Paxil 40 mg,  Buspar 30 mg twice a day, remeron 7.5 mg at bedtime, and trazodone 50 mg as needed.    Today:  Patient reports mild improvement in symptoms  Reports on-going irritability and difficulty sleeping. She describes having trouble staying asleep, waking up two to three times during the night, and feeling tired in the morning. Despite these disturbances, she estimates getting around five to six hours of sleep overnight, with additional rest in the morning until about 10 or 11 AM if she does not sleep well overnight. She has been taking trazodone as needed for sleep, approximately three to four times a week. Her energy levels are low, and she reports spending most of her day sitting, watching TV, and moving minimally between rooms likely due to chronic pain and physical limitations. Her motivation is also low, which may be contributing to her limited activity.   Continues to report visual hallucinations, specifically seeing a black dot on her hand, occurring less often approximately once a day. She does not report auditory hallucinations. She has a history of feeling sensations on her skin, like bugs, but notes a reduction in these sensations since her Vraylar dosage was increased three weeks ago. She experiences racing thoughts, particularly when riding in a vehicle, fearing potential accidents.     Side effects: Denies  Medication adherence: Reports good med adherence.    Past Psychiatric History      Established Care: 1/28/2025 (Previous Stacey Serrano, NP patient)  Therapist: [facility] 2x per month      Diagnoses: Schizoaffective disorder, depressed type and generalized anxiety disorder     Pertinent History: 3 suicide attempts in her 30s (1 by cutting, 1 by setting a fire, and 1 by overdose on methamphetamine), Self-injurious behavior via cutting in her 30s. History of violence and aggression when younger. Psychotherapy for over 10 years. Genetic loading for schizophrenia.     Previous Psychotropic  Medication Trials:  Aripiprazole (Abilify)      Pharmacogenomic Testing Completed: No    Recent Psychiatric Review of Systems      Comprehensive review of symptoms completed, pertinent positives noted below:    Depression: depressed mood, low energy, low motivation  Anxiety: irritability, racing thoughts  Psychosis: VH of bug on hand, rare tactile hallucinations of crawling sensation on hand  Trauma: No symptoms    Appetite: Appropriate. Daughter repots fluctuating sugar levels are causing concern.  Sleep: Duration: 5-6 hrs/night. Difficulty maintaining sleep  Safety: Denies SI, SIB (thoughts or behaviors), HI, property destruction, and aggression.    Current Medications     [place]MP: I have queried the [place] and/or [place] Prescription Monitoring Program for this patient for the preceding 12 months, or reviewed the report provided by my proxy delegate. I have not identified any concerns.     Current Prescribed Psychotropic Medications:  Bupropion SR (Wellbutrin) 200 mg tablet in morning   Paroxetine (Paxil) 40 mg tablet daily   Buspirone (Buspar) 30 mg tablet twice daily for anxiety.   Cariprazine (Vraylar) 6 mg capsule daily for mood and AVH.  Mirtazapine (Remeron) 7.5 mg tablet at bedtime for depression and sleep.   Trazodone (Desyrel) 50 mg a tablet at bedtime as needed for sleep.     Patient is taking medications are prescribed.  Patient denies current side effects or concerns.  Patient denies any difficulties with taking medications.     Current Outpatient Medications   Medication Sig Dispense Refill    acetaminophen (TYLENOL) 325 MG tablet Take 2 tablets (650 mg) by mouth every 4 hours as needed for other (mild pain). 100 tablet 0    ASPIRIN LOW DOSE 81 MG chewable tablet TAKE 1 TABLET BY MOUTH EVERY DAY (Patient taking differently: Take 162 mg by mouth daily.) 90 tablet 0    buPROPion (WELLBUTRIN SR) 200 MG 12 hr tablet Take 1 tablet (200 mg) by mouth daily (with breakfast). 30 tablet 1    busPIRone HCl (BUSPAR) 30 MG  tablet Take 1 tablet (30 mg) by mouth 2 times daily. TAKE 1 TABLET BY MOUTH TWICE A DAY 60 tablet 1    carboxymethylcellulose (REFRESH PLUS) 0.5 % SOLN ophthalmic solution PLACE 1 DROP INTO BOTH EYES 2 TIMES DAILY AS NEEDED FOR DRY EYES. 30 mL 11    cariprazine (VRAYLAR) 6 MG capsule Take 1 capsule (6 mg) by mouth daily. 30 capsule 1    cephALEXin (KEFLEX) 500 MG capsule Take 1 capsule (500 mg) by mouth 4 times daily. 28 capsule 0    cetirizine (ZYRTEC) 10 MG tablet TAKE 1 TABLET (10 MG) BY MOUTH DAILY. 90 tablet 1    cyclobenzaprine (FLEXERIL) 10 MG tablet Take 1 tablet (10 mg) by mouth nightly as needed for muscle spasms. 30 tablet 0    ferrous gluconate (FERGON) 324 (38 Fe) MG tablet Take 1 tablet (324 mg) by mouth daily (with breakfast). 90 tablet 0    gabapentin (NEURONTIN) 600 MG tablet TAKE 2 TABLETS (1,200 MG) BY MOUTH 3 TIMES DAILY FOR 30 DAYS 180 tablet 5    insulin glargine (LANTUS SOLOSTAR) 100 UNIT/ML pen INJECT 25 UNITS AT BEDTIME AS DIRECTED + 2 UNITS FOR PRIMING OF PEN. 30 mL 3    insulin pen needle (31G X 5 MM) 31G X 5 MM miscellaneous Use four times 4 day times a day 200 each 3    ipratropium (ATROVENT) 0.03 % nasal spray Spray 2 sprays into both nostrils every 12 hours. 30 mL 3    metFORMIN (GLUCOPHAGE XR) 500 MG 24 hr tablet Take 2 tablets (1,000 mg) by mouth 2 times daily (with meals). Take two tablets with breakfast and one tablet with dinner for one week then increase to two tablets with breakfast and two tablets with dinner thereafter 360 tablet 3    mirtazapine (REMERON) 7.5 MG tablet Take 1 tablet (7.5 mg) by mouth at bedtime. 30 tablet 1    MOUNJARO 15 MG/0.5ML SOAJ Inject 0.5 mLs (15 mg) subcutaneously every 7 days. 2 mL 3    Multiple Vitamin (DAILY-ISAAC MULTIVITAMIN) TABS TAKE 1 TABLET BY MOUTH EVERY DAY 90 tablet 0    nystatin (MYCOSTATIN) 982458 UNIT/GM external powder Apply 1 dose topically 3 times daily as needed 60 g 3    omeprazole (PRILOSEC) 40 MG DR capsule Take 1 capsule (40 mg)  by mouth daily. 90 capsule 0    ONETOUCH ULTRA test strip USE TO TEST BLOOD SUGAR 4 TIMES DAILY OR AS DIRECTED. ACCU CHECK GUIDE STRIPS 400 strip 3    oxyCODONE (ROXICODONE) 5 MG tablet Take 1 tablet (5 mg) by mouth every 6 hours as needed for severe pain. 15 tablet 0    PARoxetine (PAXIL) 40 MG tablet Take 1 tablet (40 mg) by mouth every evening. 30 tablet 1    pioglitazone (ACTOS) 45 MG tablet TAKE 1 TABLET BY MOUTH EVERY DAY 90 tablet 1    propranolol ER (INDERAL LA) 80 MG 24 hr capsule TAKE 1 CAPSULE BY MOUTH EVERY DAY 90 capsule 2    ramipril (ALTACE) 5 MG capsule Take 1 capsule (5 mg) by mouth daily. 90 capsule 1    rOPINIRole (REQUIP) 0.25 MG tablet TAKE 1 TABLET (0.25 MG) BY MOUTH EVERY EVENING 90 tablet 0    simvastatin (ZOCOR) 20 MG tablet TAKE 1 TABLET BY MOUTH EVERYDAY AT BEDTIME 90 tablet 0    traZODone (DESYREL) 50 MG tablet Take 1 tablet (50 mg) by mouth nightly as needed for sleep. 30 tablet 1    vitamin C ([employer] VITAMIN C) 500 MG tablet 1/2 TABLET BY MOUTH JACOBSON WITH IRON SUPPLEMENT BEFORE MEAL 45 tablet 0    VITAMIN D3 25 MCG (1000 UT) tablet TAKE 1 TABLET BY MOUTH EVERY DAY 90 tablet 3     Medical History      Since last visit: Recent fall requiring stitches    Pertinent History: Fall in November 2024 and hit head but did not lose consciousness. Ongoing knee pain from surgery in December 2024, still completing physical therapy 2 times per week     Primary Care Provider: Tiffany King MD PhD     Past Medical History:   Diagnosis Date    Bilateral knee pain     Cataracts, both eyes 05/08/2013    Cervical cancer (H)     Chronic kidney disease, stage 3a (H) 11/17/2021    Depressive disorder 2005?    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      Past Surgical History:   Procedure Laterality Date    ARTHROPLASTY KNEE Right 10/02/2023    Procedure: ARTHROPLASTY, RIGHT KNEE, TOTAL;  Surgeon: Sanford Sheehan MD;  [facility]    ARTHROPLASTY KNEE Left 12/18/2024    Procedure: Left total knee arthroplasty;  Surgeon: Sanofrd Sheehan MD;  [facility]: [facility]    ARTHROSCOPY KNEE RT/LT      LT    BIOPSY  1975    Lumb onleft side of breast    BLEPHAROPLASTY BILATERAL Bilateral 08/16/2019    Procedure: BILATERAL UPPER LID BLEPHAROPLASTY;  Surgeon: Randolph Cook MD;  Location: [facility]    BREAST LUMPECTOMY, RT/LT      LT-benign     CATARACT IOL, RT/LT      COLONOSCOPY  05/10/2012    Procedure:COLONOSCOPY; screening colonoscopy; Surgeon:MILLA VEGA; Location:[facility]    COLONOSCOPY WITH CO2 INSUFFLATION N/A 08/02/2019    Procedure: Colonoscopy with CO2 insufflation;  Surgeon: Himanshu Hi MD;  Location: [facility]    COLOSTOMY  2000    COLOSTOMY      HYSTERECTOMY, PAP NO LONGER INDICATED      done in [place]-cervical cancer    IMPLANT STIMULATOR SACRAL NERVE STAGE ONE Right 03/10/2015    Procedure: IMPLANT STIMULATOR SACRAL NERVE STAGE ONE;  Surgeon: Teodora Yusuf MD;  [facility]    IMPLANT STIMULATOR SACRAL NERVE STAGE TWO Right 03/31/2015    Procedure: IMPLANT STIMULATOR SACRAL NERVE STAGE TWO;  Surgeon: Teodora Yusuf MD;  [facility]    IMPLANT STIMULATOR SACRAL NERVE STAGE TWO Right 06/22/2020    Procedure: INSERTION, SACRAL NERVE STIMULATOR, STAGE 2 (replacement of interstim battery);  Surgeon: Teodora Yusuf MD;  Location[facility]    INJECT EPIDURAL TRANSFORAMINAL Right 01/20/2023    Procedure: Right Lumbar 5-Sacral 1 Transforaminal Epidural Steroid Injection with fluoroscopic guidance and contrast;  Surgeon: Kulwinder Booth MD;  Location[facility]    INJECT EPIDURAL TRANSFORAMINAL Right 03/23/2023    Procedure: Right Lumbar 5-Sacral 1 Transforaminal Epidural Steroid Injection with fluoroscopic guidance and contrast.;  " Surgeon: Kulwinder Booth MD;  [facility]    PHACOEMULSIFICATION WITH STANDARD INTRAOCULAR LENS IMPLANT Left 09/26/2019    Procedure: LEFT PHACOEMULSIFICATION, CATARACT, WITH STANDARD IOL INSERTION;  Surgeon: Francesco Winn MD;  [facility]    PHACOEMULSIFICATION WITH STANDARD INTRAOCULAR LENS IMPLANT Right 10/24/2019    Procedure: RIGHT PHACOEMULSIFICATION, CATARACT, WITH STANDARD IOL INSERTION;  Surgeon: Francesco Winn MD;  [facility]    REPAIR PTOSIS Bilateral 08/16/2019    SALPINGO OOPHORECTOMY,R/L/JENNIFER      Salpingo Oophorectomy, RT/LT/JENNIFER    ZZC EXCIS PTERYGIUM  10/2008    [facility]    ZZC STOMACH SURGERY PROCEDURE UNLISTED      ZZHC COLONOSCOPY THRU STOMA, DIAGNOSTIC  2002    normal per report-no records available-records destroyed     Social History       Since last visit: No significant changes    Pertinent History: Originates from [place]. The highest level of education they completed is [employer] and they are currently on disability.  with 3 children. Currently lives in [place], [place] with Daughter, Son In-Law, and Grandson.Reports no history of trauma, abuse,  service, or legal issues. and Denies owning firearms.      Support Systems/Strengths/Hobbies: Patient's support system includes her sister Alcira, 3 daughters, 10 grandchildren, and therapist. Patient enjoys going to the movies when she is feeling well enough to leave the house.   Current Stressors: \"I don't have any right now.\"    Substance Abuse History       Since last visit: Decreased coffee to a few times per week    Pertinent History: 1 cup off coffee daily. Brief use of methamphetamine over 25 years ago. Reports experimenting with alcohol and drugs in high school.     Allergies      Morphine sulfate     Medical Review of Systems      A comprehensive review of 10 systems (general, cardiovascular, respiratory, eyes, ENT, endocrine, GI, , M/S, neurological) was completed and is " negative other than concerns noted above.     No LMP recorded. Patient has had a hysterectomy.    Vitals      Virtual visit. Not completed today.     Labs      Most recent laboratory results reviewed and pertinent results include:     Recent Labs   Lab Test 03/27/25  1159   WBC 8.1   HGB 10.1*   HCT 30.6*   MCV 81        Recent Labs   Lab Test 03/28/25  1108 10/02/23  1012 09/15/23  1143 10/08/21  0922 08/24/20  1424   *   < > 133*   < > 135   POTASSIUM 4.1   < > 5.1   < > 3.8   CHLORIDE 96*   < > 97*   < > 103   CO2 24   < > 24   < > 24   *   < > 133*   < > 240*   NICOLÁS 9.0   < > 9.4   < > 8.9   MAG  --   --   --   --  1.9   BUN 13.9   < > 12.1   < > 7   CR 0.81   < > 0.74   < > 0.68   GFRESTIMATED 78   < > 88   < > >90   ALBUMIN  --   --  4.1   < >  --    PROTTOTAL  --   --  6.9   < >  --    AST  --   --  18   < >  --    ALT  --   --  13   < >  --    ALKPHOS  --   --  71   < >  --    BILITOTAL  --   --  0.2   < >  --     < > = values in this interval not displayed.     Recent Labs   Lab Test 03/26/25  2328 12/02/24  1059 11/06/24  0928   CHOL  --   --  98   LDL  --   --  23   HDL  --   --  50   TRIG  --   --  127   A1C 8.2*   < >  --     < > = values in this interval not displayed.     Recent Labs   Lab Test 08/24/20  1423 08/08/18  1452   TSH 1.44 5.29*   T4  --  1.08     Recent Labs   Lab Test 03/25/25  1136 08/01/23  1240 07/27/23  1027 07/14/23  0944   VITDT  --   --   --  38   FOLIC  --   --  >40.0*  --    ENZO 164   < >  --  51   IRON 56   < >  --  49   B12  --   --  402  --     < > = values in this interval not displayed.     EKG: Most recent EKG from 3/26/25 reviewed. QTc interval 437.      Screening Tools         4/15/2025     9:56 AM 3/25/2025     1:07 PM 1/28/2025     8:47 AM   PHQ   PHQ-9 Total Score 1  10  0    Q9: Thoughts of better off dead/self-harm past 2 weeks Not at all  Not at all  Not at all        Proxy-reported         4/15/2025     9:57 AM 9/9/2024    11:08 AM 1/16/2024     "10:26 AM   ISABELA-7 SCORE   Total Score 16 (severe anxiety) 13 (moderate anxiety)    Total Score 16  13 20       Proxy-reported     Mental Status Exam      Appearance: adequately groomed  Behavior: cooperative, pleasant, and calm, with fair eye contact   Speech:  normal and regular rate and rhythm  Attention/Concentration: appropriate  Mood: \"good\"  Affect: full range and appropriate; was congruent to mood; was congruent to content  Thought Process:  unremarkable  Thought Content  Perception Disturbances: Reports visual hallucinations and tactile hallucinations (see HPI);  Denies auditory hallucinations   Suicidality: No evidence of active or passive SI.  Homicidality: No evidence of HI, or any intention to harm others.   Insight: intact  Judgment: intact  Cognition: does  appear grossly intact; formal cognitive testing was not done  Recent and Remote Memory: grossly intact to interview. Not formally assessed.   Fund of Knowledge: appropriate  Gait and Station: unable to assess virtually    Risk Assessment      Feels safe in home: Yes   Suicidal ideation: Denies  Hx of suicide attempts:  Yes at approximately 30 to 41 years of age.   Hx of impulsivity: No   Hope for the future: Present  Hx of Command hallucinations or current psychosis: No  Hx of Self-injurious behaviors: Yes Current:  No  Family member  by suicide:  No     Suicide Risk Factors: Previous self harm, diagnosis of a mental disorder (especially depression or mood disorders), older age,  or single status, prior suicide attempts, and chronic disease and disability (recent knee surgery, diabetes, arthritis, CKD, and chronic pain )      Risk is mitigated by the following protective factors: access to behavioral health care, health seeking behaviors, connectedness to individuals, family, community, life skills (including good problem solving skills, coping skills, and ability to adapt to change), sense of purpose or meaning in life, personal beliefs " discouraging suicide, forward thinking, future oriented, stable housing, no access to weapons, and no current SI      Based on review of above risk and protective factors and today's exam, Holly does not appear to be an imminent risk of harm to self or others, does not meet criteria for a 72-hr hold, and therefore remains appropriate for ongoing outpatient level of care. The patient convincingly denies suicidality and homicidality today. There was no deceit detected, and the patient presented in a manner that was believable. Local community safety resources reviewed as needed and routinely attached to AVS. The patient/guardian understands to call 911 or go to the nearest ED if urgent or life threatening symptoms occur.    Assessment     Holly Muir is 70 year old female with a past psychiatric history of schizoaffective disorder, depressed type and generalized anxiety disorder  who presents today to follow up after their last appointment 3 weeks ago.    Significant symptoms include difficulty maintaining sleep, low energy, low motivation    Regarding medication management, we agreed to change trazodone from as needed to scheduled at bedtime to promote sleep. Remeron will be continued at 7.5 mg at bedtime for now. If sleep improves with trazodone, we will discontinue remeron. If no improvement is noted, then we will decrease trazodone use and increase remeron. Patient reports a combination of these medications has been helpful in the past. We will continue Wellbutrin  mg daily however may consider switching to XL formulation for motivation and energy in the future. We will continue paxil 40 mg daily for mood and anxiety, Buspar 30 mg twice a day for anxiety, and Vraylar 6 mg daily for hallucinations.     Medication side effects and alternatives reviewed. Health promotion activities recommended and reviewed today. All questions addressed. Education and counseling completed regarding risks and benefits of  medications and psychotherapy options.     There are no language or communication issues or need for modification in treatment.   There are no ethnic, cultural or Jainism factors that may be relevant for treatment.  Patient identified their preferred language to be English.  Patient does not need the assistance of an  or other support involved in treatment.    Diagnosis      295.70  (F25.1) Schizoaffective Disorder Depressive Type  300.02 (F41.1) Generalized Anxiety Disorde    Medical Comorbidities Include:   Patient Active Problem List    Diagnosis Date Noted    Knee laceration, left, initial encounter 03/27/2025     Priority: Medium    ISABELA (generalized anxiety disorder) 01/28/2025     Priority: Medium    S/P total knee arthroplasty, left 12/18/2024     Priority: Medium    S/P total knee arthroplasty, right 11/03/2023     Priority: Medium    Osteoarthritis of right knee, unspecified osteoarthritis type 10/02/2023     Priority: Medium    Noncompliance with medication regimen 09/07/2023     Priority: Medium     overuse of oxycodone, not taking gabapentin as scheduled.      Iron deficiency anemia secondary to inadequate dietary iron intake 08/04/2023     Priority: Medium    Pneumonia 05/07/2023     Priority: Medium    Chronic right shoulder pain 02/17/2023     Priority: Medium    Chronic kidney disease, stage 3a (H) 11/17/2021     Priority: Medium    Restless leg syndrome 09/22/2021     Priority: Medium    Tremor 09/22/2021     Priority: Medium    Fecal urgency 05/20/2020     Priority: Medium     Added automatically from request for surgery [id number]      Pseudophakia of right eye 10/25/2019     Priority: Medium    Anal stenosis 06/27/2019     Priority: Medium    S/P partial colectomy 06/27/2019     Priority: Medium    GERD (gastroesophageal reflux disease) 01/23/2019     Priority: Medium    Anemia 01/22/2019     Priority: Medium    Chest pain, musculoskeletal 01/22/2019     Priority: Medium    Diabetic  neuropathy (H) 01/22/2019     Priority: Medium    Hyponatremia 01/22/2019     Priority: Medium    Schizoaffective disorder (H) 01/22/2019     Priority: Medium    Obesity (BMI 35.0-39.9) with comorbidity (H) 11/14/2018     Priority: Medium    Visual hallucination 04/21/2016     Priority: Medium    Type 2 diabetes mellitus with hyperglycemia, with long-term current use of insulin (H) 12/23/2015     Priority: Medium    Fecal incontinence due to anorectal disorder 09/28/2015     Priority: Medium    Anxiety 09/02/2015     Priority: Medium    Bilateral low back pain without sciatica 06/17/2015     Priority: Medium    Urinary retention 01/22/2015     Priority: Medium    Colostomy, evaluate (H) 10/17/2014     Priority: Medium    Encounter for long-term (current) use of insulin (H) 10/12/2014     Priority: Medium    Hypoglycemia due to insulin 09/17/2014     Priority: Medium    Rotator cuff impingement syndrome 08/07/2014     Priority: Medium    Cataracts, both eyes 05/08/2013     Priority: Medium    Dermatochalasis 05/10/2012     Priority: Medium    Presbyopia 05/10/2012     Priority: Medium    OCD (obsessive compulsive disorder) 05/10/2012     Priority: Medium    HTN, goal below 140/90 05/10/2012     Priority: Medium    Pain in joint, hand 01/25/2012     Priority: Medium    Trigger finger, acquired 01/25/2012     Priority: Medium     Problem list name updated by automated process. Provider to review      Synovitis and tenosynovitis 01/25/2012     Priority: Medium     Problem list name updated by automated process. Provider to review      Mixed incontinence urge and stress 06/01/2010     Priority: Medium    Memory loss 06/01/2010     Priority: Medium     Diabetes coma around 2003      S/P colostomy (H) 04/03/2010     Priority: Medium     Due to injury to colon while she had a hysterectomy (age late 30s):  Seen Dr. Doherty in colorectal surgery at [facility]-does not want to operated. Needs her  social support and memory loss issues be resolved first.       History of cervical cancer 09/23/2009     Priority: Medium     status post NATALIE/BSO. Being followed by Dr. Angelita Addison at [facility] gyn/onc      Severe major depression with psychotic features (H) 07/31/2009     Priority: Medium     Psychiatrist: Dr. Perkins at [facility].   Neuropsychological evalation at [facility] on 12/1/09: lower end cognitive functioning and multifactorial in etiologies including effects of psychosis, medications nd perhaps non-restorative sleep. Also has features of OCD. Recommend psychiatry referral for further evaluation of past Dx of schizophrenia and also psychotherapy.   Sees a therapist monthly now (has met goals) but will monitor for relapse       Hyperlipidemia LDL goal <100 01/27/2009     Priority: Medium    Primary generalized (osteo)arthritis 01/29/2014     Priority: Low     Plan      Medications:  INCREASE to Trazodone (Desyrel) 50 mg tablet; take 1 tablet at bedtime for sleep. Script sent for #30. 1 Refills.  CONTINUE Bupropion SR (Wellbutrin) 200 mg tablet in morning for depressive symptoms. Script sent for #90. 1 Refills.  CONTINUE Paroxetine (Paxil) 40 mg tablet daily for mood and anxiety. Script sent for #90. 1 Refills.  CONTINUE Buspirone (Buspar) 30 mg tablet twice daily for anxiety. Script sent for #180. 1 Refills.  CONTINUE Cariprazine (Vraylar) 6 mg capsule daily for mood and AVH. Script sent for #90. 1 Refills.  CONTINUE Mirtazapine (Remeron) 7.5 mg tablet at bedtime for depression and sleep. Script sent for #30. 1 Refills.  Continue all other medications per primary care provider.     New Labs/Orders/Referrals: None    Follow up: Schedule an appointment with me in 4 weeks or sooner as needed.  Call [facility] at [phone] to schedule    Safety plan reviewed. To the Emergency Department as needed or call after hours crisis line at [phone] or [phone].    [place] Crisis Text Line: Text [place] to 296282 or Suicide LifeLine Chat: suicideMobileDay.org/chat  Follow up with primary care provider as planned or sooner if needed for acute medical concerns.  Call the psychiatric nurse line with medication questions or concerns at [phone].  Press Playhart may be used to communicate with your provider, but this is not intended to be used for emergencies.    Education and Resources     Community Resources:    National Suicide Prevention Lifeline: 680.588.4960 (TTY: 507.545.6314). Call anytime for help.  (www.suicidepreventionlifeline.org)  National [place] on Mental Illness (www.haja.org): 959.843.2413 or 753-427-6129.   Mental Health Association (www.mentalhealth.org): 952.414.7325 or 487-197-0159.  [place] Crisis Text [place]: Text [place] to 331265  Suicide LifeLine Chat: suicideMobileDay.org/chat    Patient Education:  If applicable the following has been discussed with the patient, parent/guardian, and or attending family member during the appointment:  Risks of polypharmacy and possible drug interactions with current medication list + common OTC products, herbs, and supplements. Moving forward, it is suggested to intermittently check-in with a clinic or retail pharmacist whenever new medications or OTC/h/s are consumed.  Recommendation to adhere to CDC guidelines as it relates alcohol consumption. If taking benzodiazepines, you should abstain from alcohol intake due to increased risks of CNS and respiratory depression, as well as psychomotor impairment.  If possible, it is recommended to avoid concurrent use of prescribed: opioids  +  benzodiazepines due to increased risks of CNS and respiratory depression, as well as the increased risk of overdose.  Recommendation to minimize and or abstain from THC use (unless the pt. is prescribed medical marijuana).  Recommendation to abstain from illicit substances including but not limited to the following: heroin, street  fentanyl, cocaine, methamphetamines, and bath salts.  Do not take opioids, stimulants, and or other prescription medications unless they are specifically prescribed for you.  Recommendation to abstain from tobacco/smoking, alcohol, THC, and all illicit substances if trying to become or are pregnant.  Black Box Warnings associated with the prescribed psychotropic(s).  Potential adverse effects of antipsychotics including but not limited to the following: weight gain, metabolic syndrome, EPS/Tardive Dyskinesias.  Potential CV and neurological adverse effects of stimulants including but not limited to the following:  sudden death, MI, stroke, HTN, cardiomyopathy (long term use) as well as seizures.  The benefits of healthy lifestyle modifications such as regular physical activity, sleep hygiene, a balanced diet, practicing mindfulness, and stress management.  Safety recommendations such as securing all prescription and OTC medications, sharps, and caustic substances in addition to removing all firearms and ammunition from the home.     Administrative Billing:   Level of Medical Decision Making:   - At least 1 chronic problem that is not stable  - Engaged in prescription drug management during visit (discussed any medication benefits, side effects, alternatives, etc.)      Supportive therapy was provided, focusing on reflective listening and solution focused problem solving.    The longitudinal plan of care for the diagnosis(es)/condition(s) as documented were addressed during this visit. Due to the added complexity in care, I will continue to support Holly in the subsequent management and with ongoing continuity of care.     Signed:   Brook Kumar, JASON, APRN, PMHNP-BC....................  4/15/2025   8:33 AM     Disclaimer: This note consists of symbols derived from keyboarding, dictation and/or voice recognition software. As a result, there may be errors in the script that have gone undetected. Please consider this  when interpreting information found in this chart.  Answers submitted by the patient for this visit:  Patient Health Questionnaire (Submitted on 4/15/2025)  If you checked off any problems, how difficult have these problems made it for you to do your work, take care of things at home, or get along with other people?: Not difficult at all  PHQ9 TOTAL SCORE: 1  Patient Health Questionnaire (G7) (Submitted on 4/15/2025)  ISABELA 7 TOTAL SCORE: 16

## 2025-04-15 NOTE — NURSING NOTE
Holly Muir is here for:     Chief Complaint   Patient presents with    RECHECK     s/p left total knee arthroplasty DOS: 12/18/24       Pain: 3/10  Taking the following medications from surgery: None  Needs refill on: None  Physical therapy clinic: At home      Opal Mathew, ATC

## 2025-04-15 NOTE — PATIENT INSTRUCTIONS
Thank you for coming to the [facility] [place].    Plan      Medications:  INCREASE to Trazodone (Desyrel) 50 mg tablet; take 1 tablet at bedtime for sleep. Script sent for #30. 1 Refills.  CONTINUE Bupropion SR (Wellbutrin) 200 mg tablet in morning for depressive symptoms. Script sent for #90. 1 Refills.  CONTINUE Paroxetine (Paxil) 40 mg tablet daily for mood and anxiety. Script sent for #90. 1 Refills.  CONTINUE Buspirone (Buspar) 30 mg tablet twice daily for anxiety. Script sent for #180. 1 Refills.  CONTINUE Cariprazine (Vraylar) 6 mg capsule daily for mood and AVH. Script sent for #90. 1 Refills.  CONTINUE Mirtazapine (Remeron) 7.5 mg tablet at bedtime for depression and sleep. Script sent for #30. 1 Refills.  Continue all other medications per primary care provider.     New Labs/Orders/Referrals: None    Follow up: Schedule an appointment with me in 4 weeks or sooner as needed.  Call [facility] at [phone] to schedule    Safety plan reviewed. To the Emergency Department as needed or call after hours crisis line at [phone] or [phone].   [place] Crisis Text [place]: Text [place] to 777226 or Suicide LifeLine Chat: suicidepreventionlifeline.org/chat  Follow up with primary care provider as planned or sooner if needed for acute medical concerns.  Call the psychiatric nurse line with medication questions or concerns at [phone].  flo.dohart may be used to communicate with your provider, but this is not intended to be used for emergencies.    Education and Resources     Resources  National [place] On Mental Illness (SOBEIDA)  [facility]  Improves the lives of children and adults with mental illnesses and their families by providing free classes on mental illnesses and support groups for adults with mental illnesses, parents and family members. For more information:  Phone: 508.936.3578  Toll free: 5-234-XDAH-HELPS  Website:  www.namihelps.org  [address], [address], [place] [address]    Online go to: www.[facility].info  Contains information on the community services individuals and communities need to sustain and improve their daily lives--health care and childcare, job training, education and recreation, halfway, disability and social service information. This directory contains information on nonprofit and public health and human service programs and some for-profit programs such as housing.    Urgent Care for Adult Mental Health   Serving [facility] & [address]   Phone: [phone]    Local Crisis Line Numbers   1. [facility] [phone]  2. [facility] Out[facility] Psychiatric Emergencies (COPE) 576.368.8799  3. [facility] [phone] or 300-214-2621  4. National Suicide Prevention Lifeline 1-364.729.9149 (TALK)    National Crisis Information: Call 988 Suicide and Crisis Lifeline  Crisis Text Line (free 24/7):  call **CRISIS (**454269) Crisis or use the texting option by texting 778413.   National Suicide Prevention Lifeline: Call 988  Poison Control Center: 5-166-329-4701  Trans Lifeline: 1-152.237.5700 - Hotline for transgender people of all ages  The [employer] Project: 7-692-123-8755 - Hotline for LGBT youth   List of all [employer] resources: https://[place].gov/dhs/people-we-serve/adults/health-care/mental-health/resources/crisis-contacts.jsp    Emergency Walk-In Options:   EmPATH Unit @ [facility] ([place]): [phone] - Specialized mental health emergency area designed to be calming  [facility] ([place]): [phone]  [facility] ([place]): [phone]  [facility]): [phone]    For Non-Emergency Support:   Fast Tracker: Mental Health & Substance Use Disorder Resources -   https://www.Cookstrtrackermn.org/    Emergency & Urgently Needed Care:   For emergencies call 911 and/or get  medical help right away.      Contact Information     Contact Us:  Please call [phone] during business hours (8-5:00 M-F).   If you have medication related questions after clinic hours, or on the weekend, please call [phone].     Financial Assistance [phone]   Medical Records [phone]     Medication Refills:  If you need any refills please call your pharmacy and they will contact us. Our fax number for refills is [phone].   Three business days of notice are needed for general medication refill requests.   Five business days of notice are needed for controlled substance refill requests.   If you need to change to a different pharmacy, please contact the new pharmacy directly. The new pharmacy will help you get your medications transferred.     Lab Testing:  If you had lab testing today and your results are reassuring or normal they will be mailed to you or sent through [employer] within 7 days. If the lab tests need quick action we will call you with the results. The phone number we will call with results is # [phone]. If this is not the best number please call our clinic and change the number.     Again thank you for choosing [facility] [place] and please let us know how we can best partner with you to improve you and your family's health.    You may be receiving a survey regarding this appointment. We would love to have your feedback, both positive and negative. The survey is done by an external company, so your answers are anonymous.

## 2025-04-15 NOTE — LETTER
4/15/2025      Holly Muir  [address] [address] [address]      Dear Colleague,    Thank you for referring your patient, Holly Muir, to the [facility]. Please see a copy of my visit note below.    Chief Complaint: RECHECK (s/p left total knee arthroplasty DOS: 12/18/24)         HPI: Holly Muir returns today in follow-up for recent laceration of left knee. She is in recovery from total knee arthroplasty in December. Unfortunately, had a fall on 3/26 when her cane getting caught on a rug when she was staying at a hotel. She attempted to avoid falling on the knee, but landed turned. She was able to immediately get up and walk, but upon return to her hotel room, she felt bleeding under her clothing and was found to have an open cut involving her knee incision and additionally a transverse tear.     She was brought to [facility] for evaluation. In the ED, she had x-rays, CT scan which were negative for arthrotomy or air within in the joint and hardware was stable without periprosthetic fracture. 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  The area was cleansed and closed with one layer of stitches. She was seen by Orthopedics who were consulted and felt no surgical irrigation/debridement was indicated. She was admitted for prophylactic antibiotics and observation as well as hyponatremia. She was discharged on 3/28 and told to followup with surgical team for monitoring. She has been in a knee immobilzer to protect the wound.      She returns for suture removal. She denies any drainage or redness about the laceration. She notes the right knee seems more sore since the injury. Hasn't noticed much swelling or any bruising. Pain is superiorlateral, worse with stairs.    Current Status:  KOOS Jr: 61.48  UCLA: 3  Global Mental Health Score -    Global Physical Health Score -    PROMIS TOTAL - SUBSCORES -      Medications and allergies are  documented in the EMR and have been reviewed.      Current Outpatient Medications:      acetaminophen (TYLENOL) 325 MG tablet, Take 2 tablets (650 mg) by mouth every 4 hours as needed for other (mild pain)., Disp: 100 tablet, Rfl: 0     ASPIRIN LOW DOSE 81 MG chewable tablet, TAKE 1 TABLET BY MOUTH EVERY DAY (Patient taking differently: Take 162 mg by mouth daily.), Disp: 90 tablet, Rfl: 0     buPROPion (WELLBUTRIN SR) 200 MG 12 hr tablet, Take 1 tablet (200 mg) by mouth daily (with breakfast)., Disp: 30 tablet, Rfl: 1     busPIRone HCl (BUSPAR) 30 MG tablet, Take 1 tablet (30 mg) by mouth 2 times daily. TAKE 1 TABLET BY MOUTH TWICE A DAY, Disp: 60 tablet, Rfl: 1     carboxymethylcellulose (REFRESH PLUS) 0.5 % SOLN ophthalmic solution, PLACE 1 DROP INTO BOTH EYES 2 TIMES DAILY AS NEEDED FOR DRY EYES., Disp: 30 mL, Rfl: 11     cariprazine (VRAYLAR) 6 MG capsule, Take 1 capsule (6 mg) by mouth daily., Disp: 30 capsule, Rfl: 1     cephALEXin (KEFLEX) 500 MG capsule, Take 1 capsule (500 mg) by mouth 4 times daily., Disp: 28 capsule, Rfl: 0     cetirizine (ZYRTEC) 10 MG tablet, TAKE 1 TABLET (10 MG) BY MOUTH DAILY., Disp: 90 tablet, Rfl: 1     cyclobenzaprine (FLEXERIL) 10 MG tablet, Take 1 tablet (10 mg) by mouth nightly as needed for muscle spasms., Disp: 30 tablet, Rfl: 0     ferrous gluconate (FERGON) 324 (38 Fe) MG tablet, Take 1 tablet (324 mg) by mouth daily (with breakfast)., Disp: 90 tablet, Rfl: 0     gabapentin (NEURONTIN) 600 MG tablet, TAKE 2 TABLETS (1,200 MG) BY MOUTH 3 TIMES DAILY FOR 30 DAYS, Disp: 180 tablet, Rfl: 5     insulin glargine (LANTUS SOLOSTAR) 100 UNIT/ML pen, INJECT 25 UNITS AT BEDTIME AS DIRECTED + 2 UNITS FOR PRIMING OF PEN., Disp: 30 mL, Rfl: 3     insulin pen needle (31G X 5 MM) 31G X 5 MM miscellaneous, Use four times 4 day times a day, Disp: 200 each, Rfl: 3     ipratropium (ATROVENT) 0.03 % nasal spray, Spray 2 sprays into both nostrils every 12 hours., Disp: 30 mL, Rfl: 3      metFORMIN (GLUCOPHAGE XR) 500 MG 24 hr tablet, Take 2 tablets (1,000 mg) by mouth 2 times daily (with meals). Take two tablets with breakfast and one tablet with dinner for one week then increase to two tablets with breakfast and two tablets with dinner thereafter, Disp: 360 tablet, Rfl: 3     mirtazapine (REMERON) 7.5 MG tablet, Take 1 tablet (7.5 mg) by mouth at bedtime., Disp: 30 tablet, Rfl: 1     MOUNJARO 15 MG/0.5ML SOAJ, Inject 0.5 mLs (15 mg) subcutaneously every 7 days., Disp: 2 mL, Rfl: 3     Multiple Vitamin (DAILY-ISAAC MULTIVITAMIN) TABS, TAKE 1 TABLET BY MOUTH EVERY DAY, Disp: 90 tablet, Rfl: 0     nystatin (MYCOSTATIN) 623928 UNIT/GM external powder, Apply 1 dose topically 3 times daily as needed, Disp: 60 g, Rfl: 3     omeprazole (PRILOSEC) 40 MG DR capsule, Take 1 capsule (40 mg) by mouth daily., Disp: 90 capsule, Rfl: 0     ONETOUCH ULTRA test strip, USE TO TEST BLOOD SUGAR 4 TIMES DAILY OR AS DIRECTED. ACCU CHECK GUIDE STRIPS, Disp: 400 strip, Rfl: 3     oxyCODONE (ROXICODONE) 5 MG tablet, Take 1 tablet (5 mg) by mouth every 6 hours as needed for severe pain., Disp: 15 tablet, Rfl: 0     PARoxetine (PAXIL) 40 MG tablet, Take 1 tablet (40 mg) by mouth every evening., Disp: 30 tablet, Rfl: 1     pioglitazone (ACTOS) 45 MG tablet, TAKE 1 TABLET BY MOUTH EVERY DAY, Disp: 90 tablet, Rfl: 1     propranolol ER (INDERAL LA) 80 MG 24 hr capsule, TAKE 1 CAPSULE BY MOUTH EVERY DAY, Disp: 90 capsule, Rfl: 2     ramipril (ALTACE) 5 MG capsule, Take 1 capsule (5 mg) by mouth daily., Disp: 90 capsule, Rfl: 1     rOPINIRole (REQUIP) 0.25 MG tablet, TAKE 1 TABLET (0.25 MG) BY MOUTH EVERY EVENING, Disp: 90 tablet, Rfl: 0     simvastatin (ZOCOR) 20 MG tablet, TAKE 1 TABLET BY MOUTH EVERYDAY AT BEDTIME, Disp: 90 tablet, Rfl: 0     traZODone (DESYREL) 50 MG tablet, Take 1 tablet (50 mg) by mouth nightly as needed for sleep., Disp: 30 tablet, Rfl: 1     vitamin C (CVS VITAMIN C) 500 MG tablet, 1/2 TABLET BY MOUTH INDIRA  WITH IRON SUPPLEMENT BEFORE MEAL, Disp: 45 tablet, Rfl: 0     VITAMIN D3 25 MCG (1000 UT) tablet, TAKE 1 TABLET BY MOUTH EVERY DAY, Disp: 90 tablet, Rfl: 3    Allergies: Morphine sulfate           Results for orders placed or performed during the hospital encounter of 03/26/25   XR Knee Left 1/2 Views     Status: None     Narrative     EXAM: XR KNEE LEFT 1/2 VIEWS  LOCATION: [facility]  DATE: 3/26/2025     INDICATION: fall knee injury  COMPARISON: 01/11/2025        Impression     IMPRESSION: Left TKA. No periprosthetic fracture or evidence of loosening. 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 Prepatellar soft tissue swelling. Soft tissue laceration or small locule of superficial soft tissue gas about   the anterior knee. Probable knee joint effusion.   CT Knee Left w/o Contrast     Status: None     Narrative     EXAM: CT KNEE LEFT W/O CONTRAST  LOCATION: [facility]  DATE: 3/27/2025     INDICATION: left kjnee injury eval for knee arthrotomy  COMPARISON: Multiple knee x-rays, most recently March 26, 2025 and more remotely December 18, 2024.  TECHNIQUE: Noncontrast. Axial, sagittal and coronal thin-section reconstruction. Dose reduction techniques were used.      FINDINGS:   Femoral and tibial knee arthroplasty hardware appears unchanged with no evidence of periprosthetic fracture.     There is a large joint effusion without lipohemarthrosis or intra-articular gas.     Subcutaneous edema superficial to the patellar tendon. A midline skin defect over the anterior knee may represent laceration or dehiscence of the surgical wound. No deep soft tissue gas identified. The extensor mechanism appears intact.        Impression     IMPRESSION:  1.  Large left knee effusion with a midline anterior skin defect which may represent laceration or dehiscence of the surgical wound. No intra-articular gas or evidence of fracture.       Physical Exam:  On  physical examination the patient appears the stated age, is in no acute distress, affect is appropriate, and breathing is non-labored.    There were no vitals taken for this visit.  There is no height or weight on file to calculate BMI.    Ambulating independently with walker and KI in place  Laceration over central to distal third of incision line with transverse extension, healing well. No drainage or surrounding erytherma  Calf soft and nontender  Able to actively straight leg raise with good strength and no extensor lag  Stable to varus and valgus stress  Distally, the circulatory, motor, and sensation exam is intact with 5/5 EHL, gastroc-soleus, and tibialis anterior.  Sensation to light touch is intact.        Right knee:  No effusion. Area of swelling consistent with fat pad. Mild TTP lateral patellofemoral joint space.   Knee ROM 0-120 with smooth tracking of the patella  Stable to varus and valgus stress and in sagittal plane.     Assessment:   Acute laceration from blunt trauma of ground level fall on 3/26/25, healing without evidence of dehiscence or infection today.   Right knee soreness from compensation    Plan:   -All sutures removed. New steris applied. She may shower and pat dry but no submerging in tub or pool and no lotions/ointments on incision. Encouraged continued daily dressing changes with baljinder to monitor for any drainage and to report any drainage to us.    -May discontinue knee immobilizer and gradually ROM to knee so long as no stress to wound.  Offered a right knee x-ray today but exam findings were reassuring. Dr. Sheehan also assessed this knee. Plan to monitor symptoms for now and x-ray if pain fails to improve.   Follow-up in 3 weeks for wound check.     She demonstrated understanding. Plan was reviewed with Dr. Sheehan.       Shayy Scott PA-C    [facility]  Orthopaedic Surgery      Referred Self      Again, thank you for allowing me to participate in the care of your  patient.        Sincerely,        Shayy Scott PA-C    Electronically signed

## 2025-04-15 NOTE — PROGRESS NOTES
Chief Complaint: RECHECK (s/p left total knee arthroplasty DOS: 12/18/24)         HPI: Holly Muir returns today in follow-up for recent laceration of left knee. She is in recovery from total knee arthroplasty in December. Unfortunately, had a fall on 3/26 when her cane getting caught on a rug when she was staying at a hotel. She attempted to avoid falling on the knee, but landed turned. She was able to immediately get up and walk, but upon return to her hotel room, she felt bleeding under her clothing and was found to have an open cut involving her knee incision and additionally a transverse tear.     She was brought to [facility] for evaluation. In the ED, she had x-rays, CT scan which were negative for arthrotomy or air within in the joint and hardware was stable without periprosthetic fracture. 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  The area was cleansed and closed with one layer of stitches. She was seen by Orthopedics who were consulted and felt no surgical irrigation/debridement was indicated. She was admitted for prophylactic antibiotics and observation as well as hyponatremia. She was discharged on 3/28 and told to followup with surgical team for monitoring. She has been in a knee immobilzer to protect the wound.      She returns for suture removal. She denies any drainage or redness about the laceration. She notes the right knee seems more sore since the injury. Hasn't noticed much swelling or any bruising. Pain is superiorlateral, worse with stairs.    Current Status:  KOOS Jr: 61.48  [facility]: 3  Global Mental Health Score -    Global Physical Health Score -    PROMIS TOTAL - SUBSCORES -      Medications and allergies are documented in the EMR and have been reviewed.      Current Outpatient Medications:     acetaminophen (TYLENOL) 325 MG tablet, Take 2 tablets (650 mg) by mouth every 4 hours as needed for other (mild pain)., Disp: 100 tablet, Rfl: 0    ASPIRIN LOW  DOSE 81 MG chewable tablet, TAKE 1 TABLET BY MOUTH EVERY DAY (Patient taking differently: Take 162 mg by mouth daily.), Disp: 90 tablet, Rfl: 0    buPROPion (WELLBUTRIN SR) 200 MG 12 hr tablet, Take 1 tablet (200 mg) by mouth daily (with breakfast)., Disp: 30 tablet, Rfl: 1    busPIRone HCl (BUSPAR) 30 MG tablet, Take 1 tablet (30 mg) by mouth 2 times daily. TAKE 1 TABLET BY MOUTH TWICE A DAY, Disp: 60 tablet, Rfl: 1    carboxymethylcellulose (REFRESH PLUS) 0.5 % SOLN ophthalmic solution, PLACE 1 DROP INTO BOTH EYES 2 TIMES DAILY AS NEEDED FOR DRY EYES., Disp: 30 mL, Rfl: 11    cariprazine (VRAYLAR) 6 MG capsule, Take 1 capsule (6 mg) by mouth daily., Disp: 30 capsule, Rfl: 1    cephALEXin (KEFLEX) 500 MG capsule, Take 1 capsule (500 mg) by mouth 4 times daily., Disp: 28 capsule, Rfl: 0    cetirizine (ZYRTEC) 10 MG tablet, TAKE 1 TABLET (10 MG) BY MOUTH DAILY., Disp: 90 tablet, Rfl: 1    cyclobenzaprine (FLEXERIL) 10 MG tablet, Take 1 tablet (10 mg) by mouth nightly as needed for muscle spasms., Disp: 30 tablet, Rfl: 0    ferrous gluconate (FERGON) 324 (38 Fe) MG tablet, Take 1 tablet (324 mg) by mouth daily (with breakfast)., Disp: 90 tablet, Rfl: 0    gabapentin (NEURONTIN) 600 MG tablet, TAKE 2 TABLETS (1,200 MG) BY MOUTH 3 TIMES DAILY FOR 30 DAYS, Disp: 180 tablet, Rfl: 5    insulin glargine (LANTUS SOLOSTAR) 100 UNIT/ML pen, INJECT 25 UNITS AT BEDTIME AS DIRECTED + 2 UNITS FOR PRIMING OF PEN., Disp: 30 mL, Rfl: 3    insulin pen needle (31G X 5 MM) 31G X 5 MM miscellaneous, Use four times 4 day times a day, Disp: 200 each, Rfl: 3    ipratropium (ATROVENT) 0.03 % nasal spray, Spray 2 sprays into both nostrils every 12 hours., Disp: 30 mL, Rfl: 3    metFORMIN (GLUCOPHAGE XR) 500 MG 24 hr tablet, Take 2 tablets (1,000 mg) by mouth 2 times daily (with meals). Take two tablets with breakfast and one tablet with dinner for one week then increase to two tablets with breakfast and two tablets with dinner thereafter,  Disp: 360 tablet, Rfl: 3    mirtazapine (REMERON) 7.5 MG tablet, Take 1 tablet (7.5 mg) by mouth at bedtime., Disp: 30 tablet, Rfl: 1    MOUNJARO 15 MG/0.5ML SOAJ, Inject 0.5 mLs (15 mg) subcutaneously every 7 days., Disp: 2 mL, Rfl: 3    Multiple Vitamin (DAILY-ISAAC MULTIVITAMIN) TABS, TAKE 1 TABLET BY MOUTH EVERY DAY, Disp: 90 tablet, Rfl: 0    nystatin (MYCOSTATIN) 348284 UNIT/GM external powder, Apply 1 dose topically 3 times daily as needed, Disp: 60 g, Rfl: 3    omeprazole (PRILOSEC) 40 MG DR capsule, Take 1 capsule (40 mg) by mouth daily., Disp: 90 capsule, Rfl: 0    ONETOUCH ULTRA test strip, USE TO TEST BLOOD SUGAR 4 TIMES DAILY OR AS DIRECTED. ACCU CHECK GUIDE STRIPS, Disp: 400 strip, Rfl: 3    oxyCODONE (ROXICODONE) 5 MG tablet, Take 1 tablet (5 mg) by mouth every 6 hours as needed for severe pain., Disp: 15 tablet, Rfl: 0    PARoxetine (PAXIL) 40 MG tablet, Take 1 tablet (40 mg) by mouth every evening., Disp: 30 tablet, Rfl: 1    pioglitazone (ACTOS) 45 MG tablet, TAKE 1 TABLET BY MOUTH EVERY DAY, Disp: 90 tablet, Rfl: 1    propranolol ER (INDERAL LA) 80 MG 24 hr capsule, TAKE 1 CAPSULE BY MOUTH EVERY DAY, Disp: 90 capsule, Rfl: 2    ramipril (ALTACE) 5 MG capsule, Take 1 capsule (5 mg) by mouth daily., Disp: 90 capsule, Rfl: 1    rOPINIRole (REQUIP) 0.25 MG tablet, TAKE 1 TABLET (0.25 MG) BY MOUTH EVERY EVENING, Disp: 90 tablet, Rfl: 0    simvastatin (ZOCOR) 20 MG tablet, TAKE 1 TABLET BY MOUTH EVERYDAY AT BEDTIME, Disp: 90 tablet, Rfl: 0    traZODone (DESYREL) 50 MG tablet, Take 1 tablet (50 mg) by mouth nightly as needed for sleep., Disp: 30 tablet, Rfl: 1    vitamin C ([employer] VITAMIN C) 500 MG tablet, 1/2 TABLET BY MOUTH JACOBSON WITH IRON SUPPLEMENT BEFORE MEAL, Disp: 45 tablet, Rfl: 0    VITAMIN D3 25 MCG (1000 UT) tablet, TAKE 1 TABLET BY MOUTH EVERY DAY, Disp: 90 tablet, Rfl: 3    Allergies: Morphine sulfate           Results for orders placed or performed during the hospital encounter of 03/26/25   XR  Knee Left 1/2 Views     Status: None     Narrative     EXAM: XR KNEE LEFT 1/2 VIEWS  LOCATION: [facility]  DATE: 3/26/2025     INDICATION: fall knee injury  COMPARISON: 01/11/2025        Impression     IMPRESSION: Left TKA. No periprosthetic fracture or evidence of loosening. 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 Prepatellar soft tissue swelling. Soft tissue laceration or small locule of superficial soft tissue gas about   the anterior knee. Probable knee joint effusion.   CT Knee Left w/o Contrast     Status: None     Narrative     EXAM: CT KNEE LEFT W/O CONTRAST  LOCATION: [facility]  DATE: 3/27/2025     INDICATION: left kjnee injury eval for knee arthrotomy  COMPARISON: Multiple knee x-rays, most recently March 26, 2025 and more remotely December 18, 2024.  TECHNIQUE: Noncontrast. Axial, sagittal and coronal thin-section reconstruction. Dose reduction techniques were used.      FINDINGS:   Femoral and tibial knee arthroplasty hardware appears unchanged with no evidence of periprosthetic fracture.     There is a large joint effusion without lipohemarthrosis or intra-articular gas.     Subcutaneous edema superficial to the patellar tendon. A midline skin defect over the anterior knee may represent laceration or dehiscence of the surgical wound. No deep soft tissue gas identified. The extensor mechanism appears intact.        Impression     IMPRESSION:  1.  Large left knee effusion with a midline anterior skin defect which may represent laceration or dehiscence of the surgical wound. No intra-articular gas or evidence of fracture.       Physical Exam:  On physical examination the patient appears the stated age, is in no acute distress, affect is appropriate, and breathing is non-labored.    There were no vitals taken for this visit.  There is no height or weight on file to calculate BMI.    Ambulating independently with walker and KI  in place  Laceration over central to distal third of incision line with transverse extension, healing well. No drainage or surrounding erytherma  Calf soft and nontender  Able to actively straight leg raise with good strength and no extensor lag  Stable to varus and valgus stress  Distally, the circulatory, motor, and sensation exam is intact with 5/5 EHL, gastroc-soleus, and tibialis anterior.  Sensation to light touch is intact.        Right knee:  No effusion. Area of swelling consistent with fat pad. Mild TTP lateral patellofemoral joint space.   Knee ROM 0-120 with smooth tracking of the patella  Stable to varus and valgus stress and in sagittal plane.     Assessment:   Acute laceration from blunt trauma of ground level fall on 3/26/25, healing without evidence of dehiscence or infection today.   Right knee soreness from compensation    Plan:   -All sutures removed. New steris applied. She may shower and pat dry but no submerging in tub or pool and no lotions/ointments on incision. Encouraged continued daily dressing changes with baljinder to monitor for any drainage and to report any drainage to us.    -May discontinue knee immobilizer and gradually ROM to knee so long as no stress to wound.  Offered a right knee x-ray today but exam findings were reassuring. Dr. Sheehan also assessed this knee. Plan to monitor symptoms for now and x-ray if pain fails to improve.   Follow-up in 3 weeks for wound check.     She demonstrated understanding. Plan was reviewed with Dr. Sheehan.       Shayy Scott PA-C    [facility]  Orthopaedic Surgery      Referred Self

## 2025-04-15 NOTE — NURSING NOTE
Is the patient currently in the state of [place]? YES    Current patient location: Patient declined to provide     Visit mode:Video    If the visit is dropped, the patient can be reconnected by: VIDEO VISIT: Text to cell phone:   Telephone Information:   Mobile [phone]       Will anyone else be joining the visit? No  (If patient encounters technical issues they should call [phone])    Are changes needed to the allergy or medication list? No    Are refills needed on medications prescribed by this physician? Discuss with Provider    Rooming Documentation: Questionnaire(s) completed.    Reason for visit: DAMIEN Britt

## 2025-04-16 ENCOUNTER — TELEPHONE (OUTPATIENT)
Dept: FAMILY MEDICINE | Facility: CLINIC | Age: 70
End: 2025-04-16

## 2025-04-16 ENCOUNTER — VIRTUAL VISIT (OUTPATIENT)
Dept: FAMILY MEDICINE | Facility: CLINIC | Age: 70
End: 2025-04-16
Payer: COMMERCIAL

## 2025-04-16 ENCOUNTER — ORDERS ONLY (AUTO-RELEASED) (OUTPATIENT)
Dept: FAMILY MEDICINE | Facility: CLINIC | Age: 70
End: 2025-04-16

## 2025-04-16 DIAGNOSIS — E11.42 DIABETIC POLYNEUROPATHY ASSOCIATED WITH TYPE 2 DIABETES MELLITUS (H): Primary | ICD-10-CM

## 2025-04-16 DIAGNOSIS — Z12.11 SCREEN FOR COLON CANCER: ICD-10-CM

## 2025-04-16 DIAGNOSIS — Z96.652 S/P LEFT UNICOMPARTMENTAL KNEE REPLACEMENT: ICD-10-CM

## 2025-04-16 DIAGNOSIS — Z12.31 VISIT FOR SCREENING MAMMOGRAM: ICD-10-CM

## 2025-04-16 DIAGNOSIS — I50.810 RIGHT-SIDED HEART FAILURE, UNSPECIFIED HF CHRONICITY (H): ICD-10-CM

## 2025-04-16 DIAGNOSIS — Z93.3 S/P COLOSTOMY (H): ICD-10-CM

## 2025-04-16 PROCEDURE — 98006 SYNCH AUDIO-VIDEO EST MOD 30: CPT | Performed by: INTERNAL MEDICINE

## 2025-04-16 NOTE — TELEPHONE ENCOUNTER
Please call the grafter [phone].  Pt requested a raised toilet seat with arms. Does [employer] carry this product and if we can fax the DME order to them?

## 2025-04-16 NOTE — CONFIDENTIAL NOTE
Forms/Letter Request    Type of form/letter: Home Health Certification      Do we have the form/letter: Yes: [facility], Change of Order ([id number])  Placed in Providers In Box    Who is the form from? Home care    Where did/will the form come from? form was faxed in    When is form/letter needed by:     How would you like the form/letter returned: Fax : [phone]    Laxmi Syed

## 2025-04-17 ENCOUNTER — PATIENT OUTREACH (OUTPATIENT)
Dept: CARE COORDINATION | Facility: CLINIC | Age: 70
End: 2025-04-17
Payer: COMMERCIAL

## 2025-04-18 ENCOUNTER — MEDICAL CORRESPONDENCE (OUTPATIENT)
Dept: HEALTH INFORMATION MANAGEMENT | Facility: CLINIC | Age: 70
End: 2025-04-18
Payer: COMMERCIAL

## 2025-04-21 ENCOUNTER — PATIENT OUTREACH (OUTPATIENT)
Dept: CARE COORDINATION | Facility: CLINIC | Age: 70
End: 2025-04-21
Payer: COMMERCIAL

## 2025-04-23 ENCOUNTER — MYC REFILL (OUTPATIENT)
Dept: PSYCHIATRY | Facility: CLINIC | Age: 70
End: 2025-04-23

## 2025-04-23 DIAGNOSIS — K21.9 GASTROESOPHAGEAL REFLUX DISEASE WITHOUT ESOPHAGITIS: ICD-10-CM

## 2025-04-23 DIAGNOSIS — D50.9 IRON DEFICIENCY ANEMIA, UNSPECIFIED IRON DEFICIENCY ANEMIA TYPE: ICD-10-CM

## 2025-04-23 RX ORDER — FERROUS GLUCONATE 324(38)MG
324 TABLET ORAL
Qty: 90 TABLET | Refills: 1 | Status: SHIPPED | OUTPATIENT
Start: 2025-04-23

## 2025-04-23 RX ORDER — OMEPRAZOLE 40 MG/1
40 CAPSULE, DELAYED RELEASE ORAL DAILY
Qty: 90 CAPSULE | Refills: 1 | Status: SHIPPED | OUTPATIENT
Start: 2025-04-23

## 2025-04-23 NOTE — TELEPHONE ENCOUNTER
1) Reviewed refill request(s) from      [facility] [id number] [facility] - [place], [place] - [address] [address]   2) Any Controlled Substance(s)? No    3) Refill(s) requested for:     - cariprazine (VRAYLAR) 6 MG capsule  Date last ordered: 04/15/25 Qty: 90 Refills: 1   refill has been requested too soon and pharmacy should have refill(s) on file            4) Action taken: spoke with pharmacy staff Spoke with pharmacy and they stated they received the refill but it was for a different dosage upon reading the chart notes the  dosage has been increased. There is plenty of medication and there is 1 refill left and routed encounter back to RN Pool for review; not within LPN/MA Scope of Practice to deny.    Kary Cunningham MA on 4/23/2025 at 11:53 AM

## 2025-04-23 NOTE — TELEPHONE ENCOUNTER
Refill of Vraylar was sent in on 4/15/25 for a 90 day supply and 1 refill. Pharmacy confirmed that they received refill.     Refill denied.     Addie Hutton RN on 4/23/2025 at 12:39 PM

## 2025-04-25 ENCOUNTER — TELEPHONE (OUTPATIENT)
Dept: FAMILY MEDICINE | Facility: CLINIC | Age: 70
End: 2025-04-25
Payer: COMMERCIAL

## 2025-04-25 NOTE — TELEPHONE ENCOUNTER
Forms/Letter Request    Type of form/letter: Home Health Certification      Do we have the form/letter: Yes: [facility], Missed visit order: [id number]    Who is the form from? Home care    Where did/will the form come from? form was faxed in    When is form/letter needed by:     How would you like the form/letter returned: Fax : [phone]    Will place form on providers desk for review/signature  TINA Messina

## 2025-04-26 ENCOUNTER — MYC MEDICAL ADVICE (OUTPATIENT)
Dept: FAMILY MEDICINE | Facility: CLINIC | Age: 70
End: 2025-04-26
Payer: COMMERCIAL

## 2025-04-26 DIAGNOSIS — N39.46 MIXED INCONTINENCE URGE AND STRESS: Primary | ICD-10-CM

## 2025-04-28 NOTE — TELEPHONE ENCOUNTER
FYI - Status Update    Who is Calling: patient    Update: Pt is requesting that this be faxed, orders have not yet been received.    Does caller want a call/response back: No

## 2025-04-28 NOTE — TELEPHONE ENCOUNTER
Routing order request to provider. Patient is requesting order for Chucks pads for sleeping due to some incontinence. Please review and advise. Efraín Liu, RN, BSN, PHN

## 2025-04-29 NOTE — PROGRESS NOTES
[facility]  [facility]  [facility] [address], [address], [place] [address]  Main: [phone], Fax: [phone]    Video Virtual Visit Note:    Patient: Holly Muir  MRN: [id number]  : 1955  MEG: 2023    Due to the ongoing COVID-19 outbreak, this visit was conducted by video, with the patient's approval.    Reason for visit:  Small, age indeterminate pulmonary embolism within segmental branch of the right lower lobe pulmonary arterial system on 2023.      Clinical History Summary:  Holly is a 68 year old female with a history of hysterectomy for cancer back in  for which ultimately underwent partial colectomy and creation of colostomy back around  (due to incontinence), who also has a history of schizoaffective disorder, chronic pain syndrome, type 2 DM, GERD, diabetic neuropathy, hyperlipidemia, anemia, hyponatremia, and chronic severe osteoarthritis of both knees with right worse than left, who was found to have small caliber pulmonary embolism in setting of co-current pneumonia found on 2023, currently on anticoagulation therapy with apixaban, participates in today's video visit for follow up. She was last seen by this writer back on 2023.     Thrombosis History Summary:   Since 2022, Holly has worsening of her chronic right knee pain and was found to have severe advanced osteoarthritis.   3/28/2023, saw Dr. Sanford Sheehan of Orthopedic surgery. At the time, her HA1C was too high for surgical management of her osteoarthritis.   Between 2023 to May 2023, her right knee pain was debilitating to the point where she sits at home most of the time. Елена ambulation or any range of motion of her right knee cause her to have pain. Thus she was rather immobile.   2023, she presented to the emergency department with right calf pain and some swelling. Right leg venous ultrasound was done and showed no DVT, no  "superficial thrombophlebitis and no popliteal cyst.   5/7/2023, she again presented to the emergency department at [facility] with chest pain, vomiting, light-headedness and diaphoresis. D-Dimer was elevated at 1.96. CTA chest showed:  Small, age-indeterminate pulmonary embolism within segmental branch of the right lower lobe pulmonary arterial system. Age-indeterminate right lower lobe consolidation, possibly an acute pneumonia. Mild interstitial edema. Small right pleural effusion. Mild subcarinal lymphadenopathy. Chest CT follow up recommended in 3-6 months.\" Bilateral lower extremity venous ultrasound showed no DVT. She was admitted and started on unfractionated heparin along with antibiotics for her pneumonia. TTE was done and showed normal functioning heart. She was subsequently discharged on apixaban on 5/10/2023.   6/6/2023, she had a follow up visit with Dr. Sanford Sheehan of [facility] who recommending that she should hold off of right total knee arthroplasty for 6-12 months considering her pulmonary embolic event found on 5/7/2023. He also recommended a hematology consultation.   Established care with this writer on 7/14/2023. I determined that her pulmonary embolism on 5/7/2023 was likely a provoked event. At the time, my plan was to check her apixaban blood level to ensure that she was absorbing her apixaban as expected given her history of partial colectomy. This was done and it actually showed that she is slightly supratherapeutic on the medication.   Also during her visit with this writer back in July 2023, she was noted to be anemic. An extensive workup about her anemia was performed by this writer with consultation with Dr. Kulwinder Gonzalez, staff hematologist. She has been arranged to get IV iron infusion. See my note on 8/4/2023 for details.   Additionally, I agreed with holding off right knee surgery until at least after 3 months of uninterrupted anticoagulation therapy. My preliminary plan was " to have her continue anticoagulation therapy until 2 months after her right total knee arthroplasty surgery.     Interim History:  8/10/2023, her last visit with this writer.   9/8/2023, she received IV iron infusion.   10/2/2023, she underwent right total knee arthroplasty surgery by Dr. Sheehan. For this surgery, she was instructed by this writer to hold her apixaban for 48 hours prior and then restart it the day following her surgery. Surgery was uncomplicated and she was discharged on 10/3/2023.     She is currently on apxiaban at 5 mg PO BID dosing. She reports that the surgery of her right total knee arthroplasty went very well. Although she continues to experience some right knee stiffness but her pain has significantly improved. She had a follow up visit with Dr. Sheehan back on 11/14/2023 (2 days ago) and she was told that Dr. Sheehan is satisfy with the result. The patient herself states that she is happy with the surgery.     Holly reports that she has no bleeding issues while on apixaban.     ROS:  Denies any bleeding complications. Specifically, no frequent epistaxis. No issues with oral mucosal bleeding. Denies any hematuria or blood in stools. Denies any shortness of breath. No chest pain. No cough. No fever.    Medications:   Current Outpatient Medications   Medication    acetaminophen (TYLENOL) 325 MG tablet    apixaban ANTICOAGULANT (ELIQUIS) 5 MG tablet    ARIPiprazole (ABILIFY) 20 MG tablet    blood glucose (NO BRAND SPECIFIED) test strip    blood glucose monitoring (NO BRAND SPECIFIED) meter device kit    blood glucose monitoring (ONE TOUCH ULTRA 2) meter device kit    blood glucose monitoring (ULTRA THIN 30G) lancets    buPROPion (WELLBUTRIN SR) 200 MG 12 hr tablet    busPIRone HCl (BUSPAR) 30 MG tablet    cetirizine (ZYRTEC) 10 MG tablet    Continuous Blood Gluc  (FREESTYLE CM 2 READER) GALINA    Continuous Blood Gluc Sensor (FREESTYLE CM 2 SENSOR) MISC    diclofenac (VOLTAREN) 1 %  topical gel    ferrous gluconate (FERGON) 324 (38 Fe) MG tablet    gabapentin (NEURONTIN) 600 MG tablet    glimepiride (AMARYL) 2 MG tablet    insulin aspart (NOVOLOG FLEXPEN) 100 UNIT/ML pen    insulin glargine (LANTUS SOLOSTAR) 100 UNIT/ML pen    insulin pen needle (31G X 5 MM) 31G X 5 MM miscellaneous    metFORMIN (GLUCOPHAGE XR) 500 MG 24 hr tablet    Multiple Vitamin (DAILY-ISAAC MULTIVITAMIN) TABS    naloxone (NARCAN) 4 MG/0.1ML nasal spray    nystatin (MYCOSTATIN) 707853 UNIT/GM external powder    omeprazole (PRILOSEC) 20 MG DR capsule    PARoxetine (PAXIL) 40 MG tablet    pioglitazone (ACTOS) 45 MG tablet    polyethylene glycol (MIRALAX) 17 g packet    propranolol ER (INDERAL LA) 80 MG 24 hr capsule    ramipril (ALTACE) 5 MG capsule    rOPINIRole (REQUIP) 0.25 MG tablet    simvastatin (ZOCOR) 20 MG tablet    tiZANidine (ZANAFLEX) 2 MG tablet    traZODone (DESYREL) 50 MG tablet    vitamin C ([employer] VITAMIN C) 500 MG tablet    VITAMIN D3 25 MCG (1000 UT) tablet     No current facility-administered medications for this visit.       Allergies:   Allergies   Allergen Reactions    Morphine Sulfate Itching        PMH:   Past Medical History:   Diagnosis Date    Bilateral knee pain     Cataracts, both eyes 5/8/2013    Cervical cancer (H)     Chronic kidney disease, stage 3a (H) 11/17/2021    Diabetes     Diabetic retinopathy (H)     HTN     Inflammatory arthritis     Major depression     Memory loss     Nephropathy     OA (osteoarthritis) of knee 9/11/2013    Other and unspecified hyperlipidemia     Pterygium eye     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       Social History:   Deferred.     Family History:  Deferred.    Objective:  Visual Examination via Video:  Pleasant in no acute distress.  Normal work of breathing   A+O x 3    Labs:    These labs were done 4 days after her total hip arthroplasty.   Component      Latest Ref Rng 10/6/2023  10:18 AM   WBC      4.0 - 11.0 10e3/uL 8.1    RBC Count      3.80 - 5.20  10e6/uL 2.92 (L)    Hemoglobin      11.7 - 15.7 g/dL 8.0 (L)    Hematocrit      35.0 - 47.0 % 24.9 (L)    MCV      78 - 100 fL 85    MCH      26.5 - 33.0 pg 27.4    MCHC      31.5 - 36.5 g/dL 32.1    RDW      10.0 - 15.0 % 16.0 (H)    Platelet Count      150 - 450 10e3/uL 330    Iron      37 - 145 ug/dL 29 (L)    Iron Binding Capacity      240 - 430 ug/dL 221 (L)    Iron Sat Index      15 - 46 % 13 (L)    % Retic      0.5 - 2.0 % 1.8    Absolute Retic      0.025 - 0.095 10e6/uL 0.053    Ferritin      11 - 328 ng/mL 671 (H)       Assessment:  In summary, Holly is a 68 year old female with a history of hysterectomy for cancer back in 1980s for which ultimately underwent partial colectomy and creation of colostomy back around 2002 (due to incontinence), who also has a history of schizoaffective disorder, chronic pain syndrome, type 2 DM, GERD, diabetic neuropathy, hyperlipidemia, anemia, hyponatremia, and chronic severe osteoarthritis of both knees with right worse than left, who was found to have small caliber pulmonary embolism in setting of co-current pneumonia found on 5/7/2023, currently on anticoagulation therapy with apxiban, participates in today's video visit for follow up.       Holly's small caliber pulmonary embolism back on 5/7/2023 was a provoked event as she was rather immobilized due to her right knee severe osteoarthritis. Additionally, at the time of the finding of pulmonary embolism, she also was found to have pneumonia, which also increases her risk for venous thromboembolism.       In regard to her anemia, an extensive workup was done in the past month and she likely has a combination of iron deficiency anemia and anemia of chronic disease.     She had rechecked labs done in Oct 2023 but they were all done 4 days after her total knee arthroplasty.      Diagnosis:  Provoked small burden pulmonary embolism on 5/7/2023 without evidence of lower extremity DVT.   S/P partial colectomy in 2002 with  colostomy creation while she was residing in [place].   Chronic anemic (at least since 2020). Likely a combination of iron deficiency and anemia of chronic disease.   Severe osteoarthritis of the right knee. S/P right total knee arthroplasty on 10/2/2023. No complications. Did well.     Plan:  At this time she is 6 weeks post right total knee arthroplasty and is 7 months out from her provoked pulmonary embolism back in 5/7/2023. Thus I do not feel there are any further indications for her to stay on anticoagulation therapy. I have instructed Holly to stop taking her apixaban as of today.     I will have one of our nurses contact her to coordinate getting her CBC, iron panel with ferritin and reticulocyte count rechecked in the next 1-2 weeks. I will contact her once these labs are back.    I do recommend that this patient should receive aggressive mechanical DVT/PE and/or pharmacological DVT/PE prophylaxis in the future if she should be in situation for increase risk of venous thromboembolism. These situations include but not limited to: 1) Prolong immobility or hospitalization of >24 hours; 2) Surgery, especially orthopedic type surgery; 3) Traumatic injury etc....     At this time, I have no further plans to see her back on a routine basis.       Video-Visit Details:  Type of service:  Video Visit  Video Start Time:  14:27  Video End Time (time video stopped): 14:40  Originating Location (pt. Location): Home  Distant Location (provider location):  [facility]   Mode of Communication:  Video Conference via [employer]      Carter Lan PA-C, MPAS  Physician Assistant  [facility].     20 minutes spent by me on the date of the encounter doing chart review, history and exam, documentation and further activities per the note     [Right] : right shoulder [5 ___] : forward flexion 5[unfilled]/5 [5___] : external rotation 5[unfilled]/5 [] : strength is improving [[email or web address]] :  ER 50

## 2025-05-01 NOTE — TELEPHONE ENCOUNTER
I pulled up previous fax on right fax and printed off the orders that they were requesting and re-faxed them back today.    [address] [address]  Fut-[phone]  Fax [phone]

## 2025-05-05 NOTE — PROGRESS NOTES
SUBJECTIVE:                                                      MARIA VICTORIA Muir is a 62 year old female who presents to clinic today for the following health issues:  Hand swelling     Duration: 1 week    Description (location/character/radiation): right wrist pain.  No radicular pain, numbness, tingling or weakness.  No redness, drainage or fevers.       Intensity:  moderate    Accompanying signs and symptoms: pain and swelling.      History (similar episodes/previous evaluation): fell out of her chair onto her outstretched R arm while trying to adjusting her chair.       Precipitating or alleviating factors: patient is right handed.  It is worse with use and movement and is relieved with rest.      Therapies tried and outcome: ibuprofen and currently on tramadol with little relief        Reviewed PMH.  Patient Active Problem List   Diagnosis     Hyperlipidemia LDL goal <100     Severe major depression with psychotic features (H)     History of cervical cancer     S/P colostomy (H)     Mixed incontinence urge and stress     Memory loss     Type 2 diabetes mellitus, uncontrolled A1C goal <8%     Obesity     Pain in joint, hand     Trigger finger, acquired     Synovitis and tenosynovitis     Dermatochalasis     Presbyopia     OCD (obsessive compulsive disorder)     HTN, goal below 140/90     Cataracts, both eyes     Health Care Home     Lumbago     Sprain of lumbar region     Generalized osteoarthrosis, unspecified site     Rotator cuff impingement syndrome     Hypoglycemia due to insulin     Encounter for long-term (current) use of insulin (H)     Colostomy, evaluate (H)     Urinary retention     ACP (advance care planning)     Bilateral low back pain without sciatica     Anxiety     Fecal incontinence due to anorectal disorder     Visual hallucination     Type 2 diabetes mellitus with hyperglycemia (H)     Current Outpatient Prescriptions   Medication Sig Dispense Refill     gabapentin (NEURONTIN) 300 MG    May 5, 2025     Patient: Marlon Molina   YOB: 2012   Date of Visit: 5/5/2025       To Whom it May Concern:    Marlon Molina was seen in my clinic on 5/5/2025 at 11:15 am.     Please excuse Marlon for his absence from school on the date listed above to be able to make his appointment.    Sincerely,         Alexi Michael MD    Medical information is confidential and cannot be disclosed without the written consent of the patient or his representative.     capsule Take 1 capsule (300 mg) by mouth 3 times daily (Due for clinic appointment before future refills) 90 capsule 0     pioglitazone (ACTOS) 30 MG tablet Take 1 tablet (30 mg) by mouth daily 90 tablet 3     insulin glargine (LANTUS SOLOSTAR) 100 UNIT/ML injection Inject 30 Units Subcutaneous At Bedtime (Patient taking differently: Inject 38 Units Subcutaneous At Bedtime ) 9 mL 3     BusPIRone HCl 30 MG TABS TAKE 1 TABLET BY MOUTH TWICE A DAY 60 tablet 6     insulin aspart (NOVOLOG FLEXPEN) 100 UNIT/ML injection Take as directed with meals. Total daily dose approx 30 units. 30 mL 1     blood glucose monitoring (ACCU-CHEK FASTCLIX) lancets Use to test blood sugar 8 times daily or as directed. 9 Box 1     traMADol (ULTRAM) 50 MG tablet Take 1-2 tablets ( mg) by mouth daily as needed for moderate pain 60 tablet 3     PARoxetine (PAXIL) 20 MG tablet TAKE 3 TABLETS AT BEDTIME 270 tablet 0     simvastatin (ZOCOR) 20 MG tablet TAKE 1 TABLET (20 MG) BY MOUTH AT BEDTIME 90 tablet 3     metFORMIN (GLUCOPHAGE) 1000 MG tablet TAKE 1 TABLET BY MOUTH 2 TIMES DAILY WITH MEALS 60 tablet 5     propranolol (INDERAL LA) 60 MG capsule Take 1 capsule (60 mg) by mouth daily May take a second dose if tremor recurs. 60 capsule 11     enalapril (VASOTEC) 2.5 MG tablet TAKE 1 TABLET BY MOUTH EVERY DAY 90 tablet 2     omeprazole (PRILOSEC) 40 MG capsule Take 1 capsule (40 mg) by mouth daily Take 30-60 minutes before a meal. (Patient taking differently: Take 40 mg by mouth daily Take 2 tabs dailyTake 30-60 minutes before a meal.) 90 capsule 3     aspirin 81 MG tablet Take 1 tablet (81 mg) by mouth daily 90 tablet 3     nystatin (MYCOSTATIN) 082216 UNIT/GM POWD Apply 1 dose topically 3 times daily as needed 60 g 3     buPROPion (WELLBUTRIN XL) 150 MG 24 hr tablet TAKE 3 TABLETS (450 MG) BY MOUTH EVERY MORNING 270 tablet 1     ABILIFY 15 MG tablet TAKE 1 TABLET BY MOUTH AT BEDTIME 90 tablet 1     blood glucose monitoring (ACCU-CHEK  CELIA) test strip Use to test blood sugars 8 times daily or as directed. 250 each 3     ORDER FOR DME Equipment being ordered:  Ostomy supplies.  Drain Pouch by [employer] #.    Also needs Barrier by [employer] #. 3 Month 4     insulin pen needle needle 3 Box See Admin Instructions Use four times a day times a day 3 Box 3     Cholecalciferol (VITAMIN D) 1000 UNITS capsule Take 2,000 to 4,000 Units per day 90 capsule      ORDER FOR DME Equipment being ordered: Cane 1 Units 0     B Complex Vitamins (B-COMPLEX/B-12 PO) Take 1 tablet by mouth daily.        ibuprofen (ADVIL,MOTRIN) 200 MG tablet Take 3-4 tablets by mouth every 4 hours as needed Reported on 2/24/2017       UNIVERSAL REMOVER WIPES EX MISC uses to remove barrier from colostomy bag at time of change 1 Each 3     SKIN PREP WIPES MISC to be used with colostomy care 1 Bottle 3     MULTI-VITAMIN OR TABS 1 TABLET DAILY       Allergies   Allergen Reactions     Morphine Sulfate Itching       ROS  All other ROS are negative.      Physical Exam   Constitutional: She is oriented to person, place, and time and well-developed, well-nourished, and in no distress. No distress.   Musculoskeletal:        Right wrist: She exhibits decreased range of motion, tenderness, bony tenderness (over the dorsal aspect) and swelling. She exhibits no effusion, no crepitus, no deformity and no laceration.        Left wrist: Normal. She exhibits normal range of motion, no bony tenderness, no swelling, no crepitus and no laceration.        Right hand: Normal. She exhibits normal range of motion, no tenderness, normal two-point discrimination, normal capillary refill, no deformity, no laceration and no swelling. Normal sensation noted. Normal strength noted.   No snuffbox tenderness.   Neurological: She is alert and oriented to person, place, and time. She has normal sensation, normal strength and normal reflexes.   Skin: Skin is warm, dry and intact.   Distal pulses are 2+ and  symmetric.  No peripheral edema.   Nursing note and vitals reviewed.  3V of R wrist:  No acute fractures or dislocations.  No soft tissue swelling or masses.  Will send for overread.        Assessment/Plan:  Right wrist injury, initial encounter:  Xrays are negative for acute injuries. Most likely strain/sprain.  Wrist was placed in ulnar gutter split and will send to orthopedics for further evaluation and management.  Recommend RICE and may continue with her tramadol and ibuprofen as needed for pain.  Recheck in clinic if symptoms worsen or if symptoms do not improve.   -     XR Wrist Right G/E 3 Views  -     ORTHO  REFERRAL      Emily See KATY [facility]oving[facility]

## 2025-05-20 NOTE — TELEPHONE ENCOUNTER
Please triage. I don't have any opening tomorrow. Is this an issue a virtual visit next Tuesday will work?    hair removal not indicated

## 2025-06-12 LAB — NONINV COLON CA DNA+OCC BLD SCRN STL QL: NORMAL

## (undated) DEVICE — SUCTION TIP YANKAUER W/O VENT K86

## (undated) DEVICE — DRAPE C-ARM 60X42" 1013

## (undated) DEVICE — LINEN TOWEL PACK X5 5464

## (undated) DEVICE — SUCTION IRR SYSTEM W/O TIP INTERPULSE HANDPIECE 0210-100-000

## (undated) DEVICE — ESU GROUND PAD ADULT W/CORD E7507

## (undated) DEVICE — Device

## (undated) DEVICE — BASIN SET MAJOR

## (undated) DEVICE — BLADE SAW SAGITTAL STRK 18X90X1.27MM HD SYS 6 6118-127-090

## (undated) DEVICE — ESU PENCIL SMOKE EVAC W/ROCKER SWITCH 0703-047-000

## (undated) DEVICE — SOL WATER IRRIG 1000ML BOTTLE 07139-09

## (undated) DEVICE — PREP CHLORAPREP 26ML TINTED ORANGE  260815

## (undated) DEVICE — BONE CLEANING TIP INTERPULSE  0210-010-000

## (undated) DEVICE — GLOVE BIOGEL PI SZ 7.5 40875

## (undated) DEVICE — COVER ULTRASOUND PROBE W/GEL FLEXI-FEEL 6"X58" LF  25-FF658

## (undated) DEVICE — SOL WATER IRRIG 1000ML BOTTLE 2F7114

## (undated) DEVICE — SU VICRYL 0 CT-1 36" J946H

## (undated) DEVICE — DRSG SILVERCEL 4.25X4.25" 900404

## (undated) DEVICE — DRSG PRIMAPORE 02X3" 7133

## (undated) DEVICE — STRAP KNEE/BODY 31143004

## (undated) DEVICE — EYE PACK CUSTOM CATARACT AS12127-01

## (undated) DEVICE — DRSG STERI STRIP 1/2X4" R1547

## (undated) DEVICE — SOL NACL 0.9% IRRIG 3000ML BAG 2B7477

## (undated) DEVICE — SU VICRYL 2-0 CT-1 27" UND J259H

## (undated) DEVICE — SOL NACL 0.9% IRRIG 1000ML BOTTLE 2F7124

## (undated) DEVICE — PREP CHLORAPREP 26ML TINTED HI-LITE ORANGE 930815

## (undated) DEVICE — SYR 10ML LL W/O NDL

## (undated) DEVICE — EYE PREP BETADINE 5% SOLUTION 30ML 0065-0411-30

## (undated) DEVICE — GLOVE PROTEXIS W/NEU-THERA 8.0  2D73TE80

## (undated) DEVICE — GOWN IMPERVIOUS SPECIALTY XLG/XLONG 32474

## (undated) DEVICE — KIT ENDO FIRST STEP DISINFECTANT 200ML W/POUCH EP-4

## (undated) DEVICE — DRSG TEGADERM 4X10" 1627

## (undated) DEVICE — SU SECURESEAL SKIN ADHESIVE 0.5ML CHSS-05

## (undated) DEVICE — HOOD SURG T7PLUS PEEL AWAY FACE SHIELD STRL LF 0416-801-100

## (undated) DEVICE — DRAPE LAP W/ARMBOARD 29410

## (undated) DEVICE — GLOVE PROTEXIS W/NEU-THERA 7.5  2D73TE75

## (undated) DEVICE — BLADE KNIFE SURG 20 371120

## (undated) DEVICE — TRAY PROCEDURE SUPPORT PAIN MANAGEMENT 332114

## (undated) DEVICE — GLOVE PROTEXIS BLUE W/NEU-THERA 7.5  2D73EB75

## (undated) DEVICE — SU CHROMIC 5-0 G-3 18" 792G

## (undated) DEVICE — GLOVE PROTEXIS MICRO 6.0  2D73PM60

## (undated) DEVICE — BNDG ELASTIC 6" DBL LENGTH UNSTERILE 6611-16

## (undated) DEVICE — GLOVE PROTEXIS W/NEU-THERA 7.0  2D73TE70

## (undated) DEVICE — TOURNIQUET CUFF 30" REPRO BLUE 60-7070-105

## (undated) DEVICE — DRAPE STERI TOWEL LG 1010

## (undated) DEVICE — SYR EAR BULB 3OZ 0035830

## (undated) DEVICE — ESU PENCIL W/SMOKE EVAC NEPTUNE STRYKER 0703-046-000

## (undated) DEVICE — TUBING IV EXTENSION SET 34"

## (undated) DEVICE — VIAL DECANTER STERILE WHITE DYNJDEC06

## (undated) DEVICE — SYR 05ML LL W/O NDL

## (undated) DEVICE — PACK MINOR SBA15MIFSE

## (undated) DEVICE — BONE CEMENT MIXEVAC HI VAC W/CARTRIDGE 0306-563-000

## (undated) DEVICE — DRSG TEGADERM ALGINATE AG 4X5" 90303

## (undated) DEVICE — LINEN BACK PACK 5440

## (undated) DEVICE — GLOVE BIOGEL PI MICRO INDICATOR UNDERGLOVE SZ 8.0 48980

## (undated) DEVICE — SU MONOCRYL 3-0 PS-1 27" Y936H

## (undated) DEVICE — NDL SPINAL 25GA 4.69" QUINCKE 405234

## (undated) DEVICE — SU DERMABOND ADVANCED .7ML DNX12

## (undated) DEVICE — PACK OCULOPLATIC SEN15OCFSD

## (undated) DEVICE — PAD CHUX UNDERPAD 23X24" 7136

## (undated) DEVICE — SU VICRYL 3-0 SH 27" UND J416H

## (undated) DEVICE — ESU EYE HIGH TEMP 65410-183

## (undated) DEVICE — SU PLAIN 6-0 G-1DA 18" 770G

## (undated) DEVICE — TRAY EPDRL 3.5IN 17GA 19GA PRFX BPA DEHP 332079

## (undated) DEVICE — SPONGE LAP 18X18" X8435

## (undated) DEVICE — SUCTION MANIFOLD NEPTUNE 2 SYS 4 PORT 0702-020-000

## (undated) DEVICE — SU VICRYL 3-0 RB-1 27" UND J215H

## (undated) DEVICE — GLOVE BIOGEL PI ULTRATOUCH G SZ 7.5 42175

## (undated) DEVICE — DECANTER VIAL 2006S

## (undated) RX ORDER — PROPOFOL 10 MG/ML
INJECTION, EMULSION INTRAVENOUS
Status: DISPENSED
Start: 2019-08-16

## (undated) RX ORDER — FENTANYL CITRATE 50 UG/ML
INJECTION, SOLUTION INTRAMUSCULAR; INTRAVENOUS
Status: DISPENSED
Start: 2024-12-18

## (undated) RX ORDER — DEXAMETHASONE SODIUM PHOSPHATE 4 MG/ML
INJECTION, SOLUTION INTRA-ARTICULAR; INTRALESIONAL; INTRAMUSCULAR; INTRAVENOUS; SOFT TISSUE
Status: DISPENSED
Start: 2019-08-16

## (undated) RX ORDER — FENTANYL CITRATE 50 UG/ML
INJECTION, SOLUTION INTRAMUSCULAR; INTRAVENOUS
Status: DISPENSED
Start: 2020-06-22

## (undated) RX ORDER — SIMETHICONE 40MG/0.6ML
SUSPENSION, DROPS(FINAL DOSAGE FORM)(ML) ORAL
Status: DISPENSED
Start: 2019-08-02

## (undated) RX ORDER — ATROPINE SULFATE 0.4 MG/ML
AMPUL (ML) INJECTION
Status: DISPENSED
Start: 2024-12-13

## (undated) RX ORDER — TRANEXAMIC ACID 650 MG/1
TABLET ORAL
Status: DISPENSED
Start: 2024-12-18

## (undated) RX ORDER — DOBUTAMINE HYDROCHLORIDE 200 MG/100ML
INJECTION INTRAVENOUS
Status: DISPENSED
Start: 2024-12-13

## (undated) RX ORDER — LIDOCAINE HYDROCHLORIDE 20 MG/ML
INJECTION, SOLUTION EPIDURAL; INFILTRATION; INTRACAUDAL; PERINEURAL
Status: DISPENSED
Start: 2019-08-16

## (undated) RX ORDER — ONDANSETRON 2 MG/ML
INJECTION INTRAMUSCULAR; INTRAVENOUS
Status: DISPENSED
Start: 2023-10-02

## (undated) RX ORDER — FENTANYL CITRATE 50 UG/ML
INJECTION, SOLUTION INTRAMUSCULAR; INTRAVENOUS
Status: DISPENSED
Start: 2019-08-16

## (undated) RX ORDER — CEFAZOLIN SODIUM 2 G/100ML
INJECTION, SOLUTION INTRAVENOUS
Status: DISPENSED
Start: 2020-06-22

## (undated) RX ORDER — FENTANYL CITRATE 50 UG/ML
INJECTION, SOLUTION INTRAMUSCULAR; INTRAVENOUS
Status: DISPENSED
Start: 2023-10-02

## (undated) RX ORDER — ONDANSETRON 2 MG/ML
INJECTION INTRAMUSCULAR; INTRAVENOUS
Status: DISPENSED
Start: 2019-08-16

## (undated) RX ORDER — OXYCODONE HYDROCHLORIDE 5 MG/1
TABLET ORAL
Status: DISPENSED
Start: 2020-06-22

## (undated) RX ORDER — GABAPENTIN 300 MG/1
CAPSULE ORAL
Status: DISPENSED
Start: 2023-10-02

## (undated) RX ORDER — CEFAZOLIN SODIUM 1 G/3ML
INJECTION, POWDER, FOR SOLUTION INTRAMUSCULAR; INTRAVENOUS
Status: DISPENSED
Start: 2020-06-22

## (undated) RX ORDER — BUPIVACAINE HYDROCHLORIDE AND EPINEPHRINE 2.5; 5 MG/ML; UG/ML
INJECTION, SOLUTION EPIDURAL; INFILTRATION; INTRACAUDAL; PERINEURAL
Status: DISPENSED
Start: 2020-06-22

## (undated) RX ORDER — ACETAMINOPHEN 325 MG/1
TABLET ORAL
Status: DISPENSED
Start: 2019-09-26

## (undated) RX ORDER — NEOSTIGMINE METHYLSULFATE 1 MG/ML
VIAL (ML) INJECTION
Status: DISPENSED
Start: 2020-06-22

## (undated) RX ORDER — GLYCOPYRROLATE 0.2 MG/ML
INJECTION INTRAMUSCULAR; INTRAVENOUS
Status: DISPENSED
Start: 2020-06-22

## (undated) RX ORDER — METOPROLOL TARTRATE 1 MG/ML
INJECTION, SOLUTION INTRAVENOUS
Status: DISPENSED
Start: 2024-12-13

## (undated) RX ORDER — LIDOCAINE HYDROCHLORIDE 10 MG/ML
INJECTION, SOLUTION EPIDURAL; INFILTRATION; INTRACAUDAL; PERINEURAL
Status: DISPENSED
Start: 2022-11-11

## (undated) RX ORDER — ACETAMINOPHEN 325 MG/1
TABLET ORAL
Status: DISPENSED
Start: 2020-06-22

## (undated) RX ORDER — ACETAMINOPHEN 325 MG/1
TABLET ORAL
Status: DISPENSED
Start: 2019-10-24

## (undated) RX ORDER — TRANEXAMIC ACID 650 MG/1
TABLET ORAL
Status: DISPENSED
Start: 2023-10-02

## (undated) RX ORDER — ACETAMINOPHEN 325 MG/1
TABLET ORAL
Status: DISPENSED
Start: 2023-10-02